# Patient Record
Sex: FEMALE | Race: WHITE
[De-identification: names, ages, dates, MRNs, and addresses within clinical notes are randomized per-mention and may not be internally consistent; named-entity substitution may affect disease eponyms.]

---

## 2019-04-03 ENCOUNTER — HOSPITAL ENCOUNTER (EMERGENCY)
Dept: HOSPITAL 46 - ED | Age: 69
LOS: 1 days | Discharge: ANOTHER HOSPITAL | End: 2019-04-04
Payer: MEDICARE

## 2019-04-03 VITALS — BODY MASS INDEX: 20.33 KG/M2 | WEIGHT: 142 LBS | HEIGHT: 70 IN

## 2019-04-03 DIAGNOSIS — Z88.8: ICD-10-CM

## 2019-04-03 DIAGNOSIS — Z79.899: ICD-10-CM

## 2019-04-03 DIAGNOSIS — Z79.4: ICD-10-CM

## 2019-04-03 DIAGNOSIS — Z87.891: ICD-10-CM

## 2019-04-03 DIAGNOSIS — Z88.5: ICD-10-CM

## 2019-04-03 DIAGNOSIS — E11.9: ICD-10-CM

## 2019-04-03 DIAGNOSIS — N18.9: ICD-10-CM

## 2019-04-03 DIAGNOSIS — R07.89: Primary | ICD-10-CM

## 2019-04-03 DIAGNOSIS — Z79.82: ICD-10-CM

## 2019-04-03 DIAGNOSIS — J44.9: ICD-10-CM

## 2019-04-03 DIAGNOSIS — R79.89: ICD-10-CM

## 2019-04-03 DIAGNOSIS — Z99.2: ICD-10-CM

## 2019-04-03 NOTE — XMS
Encounter Summary
  Created on: 2019
 
 Cherie Villalobos
 External Reference #: [id number]
 : 48
 Sex: Female
 
 Demographics
 
 
+-----------------------+--------------------------+
| Address               | [address]               |
|                       | [place]  [address] |
+-----------------------+--------------------------+
| Home Phone            | [phone]          |
+-----------------------+--------------------------+
| Preferred Language    | Unknown                  |
+-----------------------+--------------------------+
| Marital Status        |                   |
+-----------------------+--------------------------+
| Hinduism Affiliation | 1013                     |
+-----------------------+--------------------------+
| Race                  | Unknown                  |
+-----------------------+--------------------------+
| Ethnic Group          | Unknown                  |
+-----------------------+--------------------------+
 
 
 Author
 
 
+--------------+-----------------------+
| Author       | Sherry Harperlabz |
+--------------+-----------------------+
| Organization | [facility] |
+--------------+-----------------------+
| Address      | Unknown               |
+--------------+-----------------------+
| Phone        | Unavailable           |
+--------------+-----------------------+
 
 
 
 Support
 
 
+---------------+--------------+---------------------+-----------------+
| Name          | Relationship | Address             | Phone           |
+---------------+--------------+---------------------+-----------------+
| Anoop Villalobos | ECON         | [place], | [phone] |
|               |              |  [place]  [address]     |                 |
+---------------+--------------+---------------------+-----------------+
| Jennifer Dickson | ECON         | [place]       | [phone] |
|               |              | [phone]               |                 |
+---------------+--------------+---------------------+-----------------+
 
 
 
 
 Care Team Providers
 
 
+-----------------------+------+-----------------+
| Care Team Member Name | Role | Phone           |
+-----------------------+------+-----------------+
| Ivy Couch DO      | PCP  | [phone] |
+-----------------------+------+-----------------+
 
 
 
 Reason for Visit
 
 
+-------------------+----------+
| Reason            | Comments |
+-------------------+----------+
| Medication Refill |          |
+-------------------+----------+
 
 
 
 Encounter Details
 
 
+--------+--------+----------------------+----------------------+-------------+
| Date   | Type   | Department           | Care Team            | Description |
+--------+--------+----------------------+----------------------+-------------+
| / | Refill |   [facility]      |   João Asher MD  |             |
|    |        | [address]        |  [address]   |             |
|        |        | [address]   | [address]  [place], [place]    |             |
|        |        | [place] [address]   | [address]  [phone]  |             |
|        |        | [phone]         |  [phone] (Fax)  |             |
+--------+--------+----------------------+----------------------+-------------+
 
 
 
 Social History
 
 
+---------------+------------+-----------+--------+------------------+
| Tobacco Use   | Types      | Packs/Day | Years  | Date             |
|               |            |           | Used   |                  |
+---------------+------------+-----------+--------+------------------+
| Former Smoker | Cigarettes | 1         | 30     | Quit: 2004 |
+---------------+------------+-----------+--------+------------------+
 
 
 
+---------------------+---+---+---+
| Smokeless Tobacco:  |   |   |   |
| Never Used          |   |   |   |
+---------------------+---+---+---+
 
 
 
+------------------------------+
| Comments: quite smoking 2004 |
+------------------------------+
 
 
 
 
+-------------+-----------+---------+----------+
| Alcohol Use | Drinks/We | oz/Week | Comments |
|             | ek        |         |          |
+-------------+-----------+---------+----------+
| No          |           |         |          |
+-------------+-----------+---------+----------+
 
 
 
+------------------+---------------+
| Sex Assigned at  | Date Recorded |
| Birth            |               |
+------------------+---------------+
| Not on file      |               |
+------------------+---------------+
 as of this encounter
 
 Plan of Treatment
 
 
+--------+---------+-----------+----------------------+-------------+
| Date   | Type    | Specialty | Care Team            | Description |
+--------+---------+-----------+----------------------+-------------+
| 04/10/ | Office  | Podiatry  |   Srinivasan Jordan,  |             |
|    | Visit   |           | [facility]  [address], |             |
|        |         |           |  [address]  [place],  |             |
|        |         |           | [place] [address]             |             |
|        |         |           | [phone]         |             |
|        |         |           | [phone] (Fax)   |             |
+--------+---------+-----------+----------------------+-------------+
 as of this encounter
 
 Visit Diagnoses
 Not on filein this encounter

## 2019-04-03 NOTE — XMS
Encounter Summary
  Created on: 2019
 
 Cherie Villalobos
 External Reference #: [id number]
 : 48
 Sex: Female
 
 Demographics
 
 
+-----------------------+--------------------------+
| Address               | [address]               |
|                       | [place]  [address] |
+-----------------------+--------------------------+
| Home Phone            | [phone]          |
+-----------------------+--------------------------+
| Preferred Language    | Unknown                  |
+-----------------------+--------------------------+
| Marital Status        |                   |
+-----------------------+--------------------------+
| Orthodoxy Affiliation | 1013                     |
+-----------------------+--------------------------+
| Race                  | Unknown                  |
+-----------------------+--------------------------+
| Ethnic Group          | Unknown                  |
+-----------------------+--------------------------+
 
 
 Author
 
 
+--------------+--------------------------------------------+
| Author       | [facility]  |
|              | [facility] [place]                                |
+--------------+--------------------------------------------+
| Organization | [facility]  |
|              | [facility] [place]                                |
+--------------+--------------------------------------------+
| Address      | Unknown                                    |
+--------------+--------------------------------------------+
| Phone        | Unavailable                                |
+--------------+--------------------------------------------+
 
 
 
 Support
 
 
+---------------+--------------+---------------------+-----------------+
| Name          | Relationship | Address             | Phone           |
+---------------+--------------+---------------------+-----------------+
| Anoop Villalobos | ECON         | [address], | [phone] |
|               |              |  [place]  [address]     |                 |
+---------------+--------------+---------------------+-----------------+
| Jennifer Dickson | ECON         | [place], [place]       | [phone] |
|               |              | [phone]               |                 |
+---------------+--------------+---------------------+-----------------+
 
 
 
 
 Care Team Providers
 
 
+--------------------------+------+-----------------+
| Care Team Member Name    | Role | Phone           |
+--------------------------+------+-----------------+
| Araseli Montelongo Activity  | PCP  | [phone] |
| Professional             |      |                 |
+--------------------------+------+-----------------+
 
 
 
 Reason for Visit
 
 
+--------+--------------+
| Reason | Comments     |
+--------+--------------+
| Other  | heart issues |
+--------+--------------+
 
 
 
 Encounter Details
 
 
+--------+-----------+----------------------+----------------------+----------------------+
| Date   | Type      | Department           | Care Team            | Description          |
+--------+-----------+----------------------+----------------------+----------------------+
| / | Telephone |   [facility]          |   Abhijit,        | Other (heart issues) |
| 2019   |           | CARDIOLOGY  [address]    | JANETTE Plunkett  [address]  |                      |
|        |           | [address]  [place], | [address]  [place], |                      |
|        |           |  [place] [address]       |  [place] [address]       |                      |
|        |           | [phone]         | [phone]         |                      |
|        |           |                      | [phone] (Fax)   |                      |
+--------+-----------+----------------------+----------------------+----------------------+
 
 
 
 Social History
 
 
+---------------+------------+-----------+--------+------------------+
| Tobacco Use   | Types      | Packs/Day | Years  | Date             |
|               |            |           | Used   |                  |
+---------------+------------+-----------+--------+------------------+
| Former Smoker | Cigarettes | 1         | 30     | Quit: 2004 |
+---------------+------------+-----------+--------+------------------+
 
 
 
+---------------------+---+---+---+
| Smokeless Tobacco:  |   |   |   |
| Never Used          |   |   |   |
+---------------------+---+---+---+
 
 
 
 
+-------------+-----------+---------+----------+
| Alcohol Use | Drinks/We | oz/Week | Comments |
|             | ek        |         |          |
+-------------+-----------+---------+----------+
| No          |           |         |          |
+-------------+-----------+---------+----------+
 
 
 
+------------------+---------------+
| Sex Assigned at  | Date Recorded |
| Birth            |               |
+------------------+---------------+
| Not on file      |               |
+------------------+---------------+
 as of this encounter
 
 Functional Status
 
 
+---------------------------------------------+----------+--------------------+
| Functional Status                           | Response | Date of Assessment |
+---------------------------------------------+----------+--------------------+
| Are you deaf or do you have serious         | No       | 2018         |
| difficulty hearing?                         |          |                    |
+---------------------------------------------+----------+--------------------+
| Are you blind or do you have serious        | No       | 2018         |
| difficulty seeing, even when wearing        |          |                    |
| glasses?                                    |          |                    |
+---------------------------------------------+----------+--------------------+
| Do you have serious difficulty walking or   | No       | 2018         |
| climbing stairs? (5 years old or older)     |          |                    |
+---------------------------------------------+----------+--------------------+
| Do you have difficulty dressing or bathing? | No       | 2018         |
|  (5 years old or older)                     |          |                    |
+---------------------------------------------+----------+--------------------+
| Because of a physical, mental, or emotional | No       | 2018         |
|  condition, do you have difficulty doing    |          |                    |
| errands alone such as visiting a doctor's   |          |                    |
| office or shopping? [15 years old or        |          |                    |
| older)]                                     |          |                    |
+---------------------------------------------+----------+--------------------+
 
 
 
+---------------------------------------------+----------+--------------------+
| Cognitive Status                            | Response | Date of Assessment |
+---------------------------------------------+----------+--------------------+
| Because of a physical, mental, or emotional | No       | 2018         |
|  condition, do you have serious difficulty  |          |                    |
| concentrating, remembering, or making       |          |                    |
| decisions? (5 years old or older)           |          |                    |
+---------------------------------------------+----------+--------------------+
 as of this encounter
 
 Plan of Treatment
 
 
+--------+---------+------------+----------------------+-------------+
 
| Date   | Type    | Specialty  | Care Team            | Description |
+--------+---------+------------+----------------------+-------------+
| / | Office  | Cardiology |   Johnie John, |             |
| 2019   | Visit   |            |  MD  [address] |             |
|        |         |            |  [place],   |             |
|        |         |            | [place] [address]             |             |
|        |         |            | [phone]         |             |
|        |         |            | [phone] (Fax)   |             |
+--------+---------+------------+----------------------+-------------+
 as of this encounter
 
 Visit Diagnoses
 Not on filein this encounter

## 2019-04-03 NOTE — XMS
Encounter Summary
  Created on: 2019
 
 Cherie Villalobos
 External Reference #: [id number]
 : 48
 Sex: Female
 
 Demographics
 
 
+-----------------------+--------------------------+
| Address               | [address]               |
|                       | [place]  [address] |
+-----------------------+--------------------------+
| Home Phone            | [phone]          |
+-----------------------+--------------------------+
| Preferred Language    | Unknown                  |
+-----------------------+--------------------------+
| Marital Status        |                   |
+-----------------------+--------------------------+
| Catholic Affiliation | 1013                     |
+-----------------------+--------------------------+
| Race                  | Unknown                  |
+-----------------------+--------------------------+
| Ethnic Group          | Unknown                  |
+-----------------------+--------------------------+
 
 
 Author
 
 
+--------------+--------------------------------------------+
| Author       | [facility]  |
|              | [facility] [place]                                |
+--------------+--------------------------------------------+
| Organization | [facility]  |
|              | [facility] [place]                                |
+--------------+--------------------------------------------+
| Address      | Unknown                                    |
+--------------+--------------------------------------------+
| Phone        | Unavailable                                |
+--------------+--------------------------------------------+
 
 
 
 Support
 
 
+---------------+--------------+---------------------+-----------------+
| Name          | Relationship | Address             | Phone           |
+---------------+--------------+---------------------+-----------------+
| Anoop Villalobos | ECON         | [address] [place], | [phone] |
|               |              |  [place]  [address]     |                 |
+---------------+--------------+---------------------+-----------------+
| Jennifer Dickson | ECON         | [place]       | [phone] |
|               |              | [phone]               |                 |
+---------------+--------------+---------------------+-----------------+
 
 
 
 
 Care Team Providers
 
 
+-----------------------+------+-------------+
| Care Team Member Name | Role | Phone       |
+-----------------------+------+-------------+
 PCP  | Unavailable |
+-----------------------+------+-------------+
 
 
 
 Reason for Visit
 
 
+---------------+----------+
| Reason        | Comments |
+---------------+----------+
| Device Check  | Billed   |
| (Remote)      |          |
+---------------+----------+
 
 
 
 Encounter Details
 
 
+--------+----------+----------------------+----------------------+----------------------+
| Date   | Type     | Department           | Care Team            | Description          |
+--------+----------+----------------------+----------------------+----------------------+
| / | Implant  |   [facility]          |   Johnie John, | Remote Device        |
| 2019   | Monitor  | CARDIOLOGY  [address]    |  MD  [address] | Interrogation        |
|        |          | [address]  [place], |  [address]  [place],   | (Primary Dx); ICD    |
|        |          |  [place] [address]       | [place] [address]             | (implantable         |
|        |          | [phone]         | [phone]         | cardioverter-defibri |
|        |          |                      | [phone] (Fax)   | llator) [place]       |
|        |          |                      |                      | Scientific  18   |
|        |          |                      |                      | [place];           |
|        |          |                      |                      | Cardiomyopathy,      |
|        |          |                      |                      | unspecified type     |
|        |          |                      |                      | (HCC)                |
+--------+----------+----------------------+----------------------+----------------------+
 
 
 
 Social History
 
 
+---------------+------------+-----------+--------+------------------+
| Tobacco Use   | Types      | Packs/Day | Years  | Date             |
|               |            |           | Used   |                  |
+---------------+------------+-----------+--------+------------------+
| Former Smoker | Cigarettes | 1         | 30     | Quit: 2004 |
+---------------+------------+-----------+--------+------------------+
 
 
 
+---------------------+---+---+---+
 
| Smokeless Tobacco:  |   |   |   |
| Never Used          |   |   |   |
+---------------------+---+---+---+
 
 
 
+-------------+-----------+---------+----------+
| Alcohol Use | Drinks/We | oz/Week | Comments |
|             | ek        |         |          |
+-------------+-----------+---------+----------+
| No          |           |         |          |
+-------------+-----------+---------+----------+
 
 
 
+------------------+---------------+
| Sex Assigned at  | Date Recorded |
| Birth            |               |
+------------------+---------------+
| Not on file      |               |
+------------------+---------------+
 as of this encounter
 
 Functional Status
 
 
+---------------------------------------------+----------+--------------------+
| Functional Status                           | Response | Date of Assessment |
+---------------------------------------------+----------+--------------------+
| Are you deaf or do you have serious         | No       | 2018         |
| difficulty hearing?                         |          |                    |
+---------------------------------------------+----------+--------------------+
| Are you blind or do you have serious        | No       | 2018         |
| difficulty seeing, even when wearing        |          |                    |
| glasses?                                    |          |                    |
+---------------------------------------------+----------+--------------------+
| Do you have serious difficulty walking or   | No       | 2018         |
| climbing stairs? (5 years old or older)     |          |                    |
+---------------------------------------------+----------+--------------------+
| Do you have difficulty dressing or bathing? | No       | 2018         |
|  (5 years old or older)                     |          |                    |
+---------------------------------------------+----------+--------------------+
| Because of a physical, mental, or emotional | No       | 2018         |
|  condition, do you have difficulty doing    |          |                    |
| errands alone such as visiting a doctor's   |          |                    |
| office or shopping? [15 years old or        |          |                    |
| older)]                                     |          |                    |
+---------------------------------------------+----------+--------------------+
 
 
 
+---------------------------------------------+----------+--------------------+
| Cognitive Status                            | Response | Date of Assessment |
+---------------------------------------------+----------+--------------------+
| Because of a physical, mental, or emotional | No       | 2018         |
|  condition, do you have serious difficulty  |          |                    |
| concentrating, remembering, or making       |          |                    |
| decisions? (5 years old or older)           |          |                    |
+---------------------------------------------+----------+--------------------+
 as of this encounter
 
 
 Plan of Treatment
 
 
+--------+---------+------------+----------------------+-------------+
| Date   | Type    | Specialty  | Care Team            | Description |
+--------+---------+------------+----------------------+-------------+
| / | Office  | Cardiology |   Johnie John, |             |
|    | Visit   |            |  MD  [address] |             |
|        |         |            |  [place],   |             |
|        |         |            | [place] [address]             |             |
|        |         |            | [phone]         |             |
|        |         |            | [phone] (Fax)   |             |
+--------+---------+------------+----------------------+-------------+
 as of this encounter
 
 Procedures
 
 
+-----------------+--------+-------------+----------------------+----------------------+
| Procedure Name  | Priori | Date/Time   | Associated Diagnosis | Comments             |
|                 | ty     |             |                      |                      |
+-----------------+--------+-------------+----------------------+----------------------+
| DEVICE          | Routin | 2019  |   Remote Device      |   Results for this   |
| INTERROGATION-  | e      | 9 PDT    | Interrogation   ICD  | procedure are in the |
| REMOTE          |        |             | (implantable         |  results section.    |
|                 |        |             | cardioverter-defibri |                      |
|                 |        |             | llator) [place]       |                      |
|                 |        |             | Scientific  18   |                      |
|                 |        |             | [place]ora            |                      |
|                 |        |             | Cardiomyopathy,      |                      |
|                 |        |             | unspecified type     |                      |
|                 |        |             | (LTAC, located within [facility])                |                      |
+-----------------+--------+-------------+----------------------+----------------------+
 in this encounter
 
 Results
 Device Interrogation - Remote (2019 114)
 
+-----------------------------------------------------------------------+--------------+
| Narrative                                                             | Performed At |
+-----------------------------------------------------------------------+--------------+
|   This result has an attachment that is not available.  Johnie        |   [facility]    |
| MD [place]         2019 15:05Date of Remote Interrogation:    |              |
| 19 Refer to [facility] documentation and remote PDF scanned into    |              |
| EPIC for remote interrogation results.    Data collected by Clementina SALAZAR     |              |
| NAHUN Maynard Presenting rhythm:    sinus rhythm 59-60 beats.0           |              |
| ventricular high rate episodes. No tachycardia therapies recommended  |              |
| or delivered.Histogram    good.     Battery longevity                 |              |
| 12    years.Apparent normal and stable device function.Device         |              |
| interrogation due in office in 2019.                        |              |
|recommended or delivered.                                              |              |
|Histogram    good.     Battery longevity 12    years.                 |              |
|Apparent normal and stable device function.                            |              |
|Device interrogation due in office in 2019.                  |              |
|                                                                       |              |
+-----------------------------------------------------------------------+--------------+
 
 
 
 
+--------------+---------+--------------------+--------------+
| Performing   | Address | City/State/Zipcode | Phone Number |
| Organization |         |                    |              |
+--------------+---------+--------------------+--------------+
|   [facility]    |         |                    |              |
+--------------+---------+--------------------+--------------+
 in this encounter
 
 Visit Diagnoses
 
 
+------------------------------------------------------------------------------------+
| Diagnosis                                                                          |
+------------------------------------------------------------------------------------+
|   Remote Device Interrogation  - Primary                                           |
+------------------------------------------------------------------------------------+
|   Fitting and adjustment of automatic implantable cardiac defibrillator            |
+------------------------------------------------------------------------------------+
|   ICD (implantable cardioverter-defibrillator) [employer]  [address] |
+------------------------------------------------------------------------------------+
|   Cardiomyopathy, unspecified type (HCC)                                           |
+------------------------------------------------------------------------------------+

## 2019-04-03 NOTE — XMS
Encounter Summary
  Created on: 2019
 
 Cherie Villalobos
 External Reference #: [id number]
 : 48
 Sex: Female
 
 Demographics
 
 
+-----------------------+--------------------------+
| Address               | [address]               |
|                       | [place]  [address] |
+-----------------------+--------------------------+
| Home Phone            | [phone]          |
+-----------------------+--------------------------+
| Preferred Language    | Unknown                  |
+-----------------------+--------------------------+
| Marital Status        |                   |
+-----------------------+--------------------------+
| Voodoo Affiliation | 1013                     |
+-----------------------+--------------------------+
| Race                  | Unknown                  |
+-----------------------+--------------------------+
| Ethnic Group          | Unknown                  |
+-----------------------+--------------------------+
 
 
 Author
 
 
+--------------+-----------------------+
| Author       | Sherry Threefold Photos |
+--------------+-----------------------+
| Organization | [facility] |
+--------------+-----------------------+
| Address      | Unknown               |
+--------------+-----------------------+
| Phone        | Unavailable           |
+--------------+-----------------------+
 
 
 
 Support
 
 
+---------------+--------------+---------------------+-----------------+
| Name          | Relationship | Address             | Phone           |
+---------------+--------------+---------------------+-----------------+
| Anoop Villalobos | ECON         | [place], | [phone] |
|               |              |  [place]  [address]     |                 |
+---------------+--------------+---------------------+-----------------+
| Jennifer Dickson | ECON         | [place]       | [phone] |
|               |              | [address]               |                 |
+---------------+--------------+---------------------+-----------------+
 
 
 
 
 Care Team Providers
 
 
+-----------------------+------+-----------------+
| Care Team Member Name | Role | Phone           |
+-----------------------+------+-----------------+
| Ivy Couch DO      | PCP  | [phone] |
+-----------------------+------+-----------------+
 
 
 
 Reason for Visit
 
 
+---------------------+----------+
| Reason              | Comments |
+---------------------+----------+
| Chest Pain          |          |
+---------------------+----------+
| Shortness of Breath |          |
+---------------------+----------+
| Nausea              |          |
+---------------------+----------+
 Auth/Cert
 
+--------+--------+-----------+--------------+--------------+---------------+
| Status | Reason | Specialty | Diagnoses /  | Referred By  | Referred To   |
|        |        |           | Procedures   | Contact      | Contact       |
+--------+--------+-----------+--------------+--------------+---------------+
|        |        | Internal  |   Diagnoses  |              |   [facility] 4[facility]    |
|        |        | Medicine  |  Elevated    |              | [address]   |
|        |        |           | blood        |              | [address]
|        |        |           | pressure     |              |  [address]   |
|        |        |           | reading  S/P |              | [place], [place]  |
|        |        |           |  MVR (mitral |              | [address]  Phone: |
|        |        |           |  valve       |              |  [phone] |
|        |        |           | repair)  Hx  |              |   Fax:        |
|        |        |           | of CABG      |              | [phone]  |
|        |        |           | Chest pain   |              |               |
|        |        |           | in adult     |              |               |
|        |        |           |              |              |               |
+--------+--------+-----------+--------------+--------------+---------------+
 
 
 
 
 Encounter Details
 
 
+--------+-----------+----------------------+----------------------+----------------------+
| Date   | Type      | Department           | Care Team            | Description          |
+--------+-----------+----------------------+----------------------+----------------------+
| / | Emergency |   [facility]    |   Nathaniel De Luna, | Hx of CABG (Primary  |
| 2019 - |           | [facility]   |  DO  [address]  | Dx); Chest pain in   |
|        |           | [facility] |  EMERGENCY           | adult; S/P MVR       |
| / |           |   [address]     | DEPARTMENT           | (mitral valve        |
|    |           | [place] [address]   | [place] [address]   | repair); Elevated    |
|        |           | [phone]         | [phone]         | blood pressure       |
|        |           |                      | [phone] (Fax)   | reading; Chronic     |
 
|        |           |                      | Negro Lr, DO    | systolic congestive  |
|        |           |                      | [address]  [address]  | heart failure (HCC); |
|        |           |                      | [address]           |  Chest pain,         |
|        |           |                      | [place] [address]   | unspecified type     |
|        |           |                      | [phone]         |                      |
|        |           |                      | [phone] (Fax)   |                      |
|        |           |                      | Silsa Ferrara MD  [address]                      |
|        |           |                      |  [address]  [address]     |                      |
|        |           |                      | [address]           |                      |
|        |           |                      | [place] [address]   |                      |
|        |           |                      | [phone]         |                      |
|        |           |                      | [phone] (Fax)   |                      |
+--------+-----------+----------------------+----------------------+----------------------+
 
 
 
 Social History
 
 
+---------------+------------+-----------+--------+------------------+
| Tobacco Use   | Types      | Packs/Day | Years  | Date             |
|               |            |           | Used   |                  |
+---------------+------------+-----------+--------+------------------+
| Former Smoker | Cigarettes | 1         | 30     | Quit: 2004 |
+---------------+------------+-----------+--------+------------------+
 
 
 
+---------------------+---+---+---+
| Smokeless Tobacco:  |   |   |   |
| Never Used          |   |   |   |
+---------------------+---+---+---+
 
 
 
+------------------------------+
| Comments: quite smoking  |
+------------------------------+
 
 
 
+-------------+-----------+---------+----------+
| Alcohol Use | Drinks/We | oz/Week | Comments |
|             | ek        |         |          |
+-------------+-----------+---------+----------+
| No          |           |         |          |
+-------------+-----------+---------+----------+
 
 
 
+------------------+---------------+
| Sex Assigned at  | Date Recorded |
| Birth            |               |
+------------------+---------------+
| Not on file      |               |
+------------------+---------------+
 as of this encounter
 
 Last Filed Vital Signs
 
 
 
+-------------------+----------------------+-------------------------+
| Vital Sign        | Reading              | Time Taken              |
+-------------------+----------------------+-------------------------+
| Blood Pressure    | 108/56               | 2019 11:46 AM PST |
+-------------------+----------------------+-------------------------+
| Pulse             | 68                   | 2019 11:46 AM PST |
+-------------------+----------------------+-------------------------+
| Temperature       | 36.5   C (97.7   F)  | 2019 11:46 AM PST |
+-------------------+----------------------+-------------------------+
| Respiratory Rate  | 20                   | 2019 11:46 AM PST |
+-------------------+----------------------+-------------------------+
| Oxygen Saturation | 92%                  | 2019 11:46 AM PST |
+-------------------+----------------------+-------------------------+
| Inhaled Oxygen    | -                    | -                       |
| Concentration     |                      |                         |
+-------------------+----------------------+-------------------------+
| Weight            | 65.5 kg (144 lb 6.4  | 2019  3:16 AM PST |
|                   | oz)                  |                         |
+-------------------+----------------------+-------------------------+
| Height            | 177.8 cm (5' 10")    | 2019  1:52 PM PST |
+-------------------+----------------------+-------------------------+
| Body Mass Index   | 20.72                | 2019  3:16 AM PST |
+-------------------+----------------------+-------------------------+
 in this encounter
 
 Discharge Summaries
 Silas Ferrara MD - 2019  8:20 AM PSTFormatting of this note may be different from the 
original.
 [facility]
 Service:  Hospitalist
 Discharge Summary
 
 Date of Admission:  2019
 Date of Discharge:   2019
 
 Discharge Physician:  Silas Ferrara MD
 Treatment Team: 
 Admitting Provider: Negro Lr DO
 Discharge Diagnoses:   
 
 Principal Problem:
   Chest pain
 Active Problems:
   Coronary artery disease involving native coronary artery of native heart without angina p
ectoris
   ESRD (end stage renal disease) ([facility])
   Type 2 diabetes mellitus with chronic kidney disease on chronic dialysis, with long-term 
current use of insulin (HCC)
   Anemia in ESRD (end-stage renal disease) ([facility])
   Hypertension, essential
   Pleural effusion
   Chronic systolic congestive heart failure (HCC)
   Chronic diarrhea
   Chronic anticoagulation
   Cardiomyopathy (HCC)
   COPD (chronic obstructive pulmonary disease) ([facility])
   ICD (implantable cardioverter-defibrillator) in place
   Paroxysmal atrial fibrillation (HCC)
   Chronic multifocal osteomyelitis of left foot ([facility])
 
   PVD (peripheral vascular disease) ([facility])
 Resolved Problems:
   * No resolved hospital problems. *
 
 Procedures:  * No surgery found *
 
 Significant Diagnostic Studies:  No results found.
 
 BRIEF HISTORY OF PRESENTATION:  
      Cherie Villalobos is a 68 y.o. female who presented per H&P"from [facility] of chest pain that started last night about midnight and lasted until about 5 AM this morn
ing, pressure, intense, nonradiating, with nausea and dyspnea, no sweating. Improved with SL
 NTG initially, but the NTG's became less effective as she took more of them (5 total). Got 
a NTG patch at [facility] ED which was very effective in controlling the pain. She has ve
ry complicated cardiac history including CAD, CABG, systolic CHF, ischemic cardiomyopathy, p
acemaker, valve repair, afib, anticoagulation, with several other comorbidities including CO
PD, ESRD on HD, DM2, PVD. She has a cardiologist in [place] but [facility] refused to
 call that doctor to aid in decision-making and simply transferred the patient to [facility] ED.
 
 The patient was at [facility] a month ago for L foot toe amputation due to osteomyelitis. Dr Fr correia is her surgeon. During that hospitalization she c/o chest pain with troponin elevation
 to 0.318 and was evaluated by cardiology. Stress test a month prior had been normal. It was
 decided that medical management was the only reasonable option for the patient, given her e
xtensive comorbidities.
 
 Post-amputation she has been instructed to be strict NWB on LLE, and she was sent to [facility]. She remains on abx post-procedure managed by Dr Elliott. The patient is convinced that [facility] is not administering her meds correctly. She thinks they might be skipping her Eliqui
s, clopidogrel, and carvedilol. This happened before, earlier in 2018. She says the nurses dakota miller that the meds are given but they are not given. She does not want to return to [facility],
 if at all possible, and wants to return home with paid caregiver if that will comply with STEVEN Jordan's post-op instructions"
 HOSPITAL COURSE:  
 She was seen by  and he recc to stop all IV abx.
 She was discharged home with full time care giver. 
 She was seen by  and he recc medical management 
 Patient was discharged in stable condition. Addressed all of their questions and covered wi
th side affects of newly added medications. she was cleared per consulting services.
 
 Past Medical History 
 Diagnosis Date 
   Acute MI (HCC)  
  with stenting x 1 
   Anemia associated with chronic renal failure 2016 
   Atrial fibrillation (HCC)  
   Chronic kidney disease  
   Congestive heart failure (HCC)  
   COPD (chronic obstructive pulmonary disease) (HCC)  
   COPD (chronic obstructive pulmonary disease) ([facility]) 2018 
   Coronary artery disease  
  stent placements: 3 total 
   Depression  
   Diabetes other 
  abstracted 
   Diabetes mellitus, type 2 ([facility])  
   ESRD on peritoneal dialysis ([facility])  
   GERD (gastroesophageal reflux disease)  
   Hemodialysis patient ([facility]) 10/2016 
  Stopped peritoneal and restarted hemodialysis 
   Hemorrhage of gastrointestinal tract, unspecified 2017 
 
  low GI, rectal bleeding 
   High blood pressure other 
  abstracted 
   High cholesterol other 
  abstracted 
   Hyperlipidemia  
   Hypertension  
   Hyponatremia 2017 
   Myocardial infarction ([facility]) 2017 
   Other chronic pain  
  feet,  
   Pain of left hip joint 2016 
  due to hip fracture and replacement 
   Partial small bowel obstruction ([facility]) 2017 
  resolved 
   Renal failure  
  Stage 5.   Fistula in the JAN - not on dialysis yet. 
   Unspecified visual disturbance  
  glasses 
 
 Past Surgical History 
 Procedure Laterality Date 
   AV FISTULA PLACEMENT   
  left upper arm AV fistula - failed 
   AV FISTULA REPAIR N/A 10/25/2016 
  Procedure: AV FISTULA - GRAFT REPAIR/REVISION;  Surgeon: Kirt Mclaughlin MD;  Location: [facility]
IN OR;  Service: Vascular;  Laterality: N/A;  Superficialization and revision of left arm fi
stula 
   CARDIAC CATHETERIZATION  2017 
   CARPAL TUNNEL RELEASE Bilateral other 
  abstracted 
   CATARACT EXTRACTION Bilateral  
   CATHETER REMOVAL N/A 2016 
  Procedure: DIALYSIS CATHETER - REMOVAL;  Surgeon: Kirt Mclaughlin MD;  Location: [facility] MAIN OR; 
 Service: Vascular;  Laterality: N/A;  PD cath removal 
   CHOLECYSTECTOMY  other 
  abstracted 
   COLONOSCOPY   
   CORONARY ARTERY BYPASS GRAFT   
   CORONARY STENT PLACEMENT  2017 
  Drug Elutin stents to OM, 1 stent to prox. LAD 
   EYE SURGERY   
   FOOT AMPUTATION Left 2018 
  Procedure: FOREFOOT - AMPUTATION;  Surgeon: Srinivasan Jordan DPM;  Location: [facility] MAIN OR;
  Service: Podiatry;  Laterality: Left; 
   HARDWARE PRESENT   
  stent placements x 3, Left hip replacement, vascular stents 2 in left leg 
   HIP ARTHROPLASTY Left 2016 
  Procedure: HIP - ARTHROPLASTY(ALLISON);  Surgeon: Uriel Roa MD;  Location: [facility] MAIN 
OR;  Service: Orthopedics;  Laterality: Left; 
   HYSTERECTOMY  1998 
  complete 
   PACEMAKER INSERTION   
  [employer] pacemaker/defib 
   PERITONEAL CATHETER INSERTION  8/15/2013 
   PERITONEAL CATHETER INSERTION N/A 2016 
  Procedure: LAPAROSCOPIC - PERITONEAL DIALYSIS CATH INSERTION;  Surgeon: Kirt Mclaughlin MD;  Lo
cation: [facility] MAIN OR;  Service: Vascular;  Laterality: N/A;  Removal of old catheter 
   SHOULDER SURGERY Right  
  frozen shoulder 
 
   UNLISTED PROCEDURE ARTHROSCOPY   
  total Left hip 
   vascular stents Left 2017 
  leg (2 stents) 
   VASCULAR SURGERY   
 
 Allergies 
 Allergen Reactions 
   Quinapril Hcl Anaphylaxis 
   Digoxin And Related Other (See Comments) 
   toxic 
   Metaxalone Other (See Comments) 
   Morphine Mental Changes 
   Make her crazy/ "funny feeling in my head"
 Has used hydromorphone in-house 
   Pantoprazole Sodium Nausea and Vomiting 
   Penicillins Rash 
   Quinapril Hcl Edema 
   Facial Edema 
   Sudafed [Pseudoephedrine Hcl] Rash 
 
 No prescriptions prior to admission. 
 
 DISCHARGE EXAM
 Vital Signs:
 /56 (BP Location: Right upper arm)  | Pulse 68  | Temp 97.7 F (36.5 C) (Axillary)
  | Resp 20  | Ht 1.778 m (5' 10")  | Wt 65.5 kg (144 lb 6.4 oz)  | SpO2 92%  | BMI 20.72 kg
/m 
 General appearance: alert, appears stated age, cooperative, fatigued and no distress
 Head: Normocephalic, without obvious abnormality, atraumatic
 Neck: no adenopathy, no carotid bruit, no JVD, supple, symmetrical, trachea midline and thy
roid not enlarged, symmetric, no tenderness/mass/nodules
 Lungs: clear to auscultation bilaterally
 Heart: regular rate and rhythm, S1, S2 normal, no murmur, click, rub or gallop
 Abdomen: soft, non-tender; bowel sounds normal; no masses,  no organomegaly
 Extremities: left leg boot 
 Pulses: 2+ and symmetric
 Neurologic: Grossly normal AXO3 non focal 
 
 DATA
 
 CBC:  
 Lab Results 
 Component Value Date 
  WBC 4.08 2019 
  RBC 2.83 (L) 2019 
  HGB 9.7 (L) 2019 
  HCT 29.7 (L) 2019 
  .0 (H) 2019 
  MCH 34.2 (H) 2019 
  MCHC 32.5 2019 
  RDW 64.3 (H) 2019 
   (L) 2019 
  MPV 9.5 2019 
  DIFFTYPE MANUAL 2019 
 
 CMP:  
 Lab Results 
 Component Value Date 
   2019 
 
  K 4.4 2019 
   2019 
  CO2 28 2019 
  ANIONGAP 14 2019 
  GLUF 153 (H) 2019 
  BUN 15 2019 
  CREATININE 4.9 (H) 2019 
  BCR 3 2019 
  CA 9.6 2019 
  CA 8.7 2016 
  PROT 6.1 (L) 2019 
  ALB 2.6 (L) 2019 
  GLOB 3.5 2019 
  BILITOT 0.5 2019 
  ALP 85 2019 
  AST 22 2019 
  ALT 22 2019 
  EGFR 9 (L) 2019 
 
 Lab Results 
 Component Value Date 
  CKTOTAL 22 (L) 2019 
  CKMB 1.8 2019 
  CKMBINDEX 8.2 2019 
  TROPONINI 0.061 2019 
 
 Disposition:  Home
 Condition:  Stable
 Code Status:  Prior
 
 Discharge Instructions
 Diet Renal 
 
 Diet Low Sodium 
 
 Activity as Advised by Physical Therapy 
 
 Call MD for: Temperature > 100.4F (38C) 
 
 Call MD for:  Persistant Nausea and Vomiting 
 
 Call MD for:  Severe Uncontrolled Pain 
 
 Call MD for:  Redness, Tenderness, or Signs of Infection (Pain, Swelling, Redness, Odor or 
Green/Yellow Discharge Around Incision Site) 
 
 Call MD for:  Hives 
 
 Call MD for:  Difficulty Breathing, Headache or Visual Disturbances 
 
 Call MD for:  Persistant Dizziness or Light-Headedness 
 
 Call MD for: Extreme Fatigue 
 
 Follow up:
 Ivy Couch, DO
 [address], [address]
 [facility] [id number] [phone]
 
 
 Schedule an appointment as soon as possible for a visit in 1 week
 
 Andrés Elliott MD
 [address] [facility] [id number] [phone]
 
 Schedule an appointment as soon as possible for a visit in 1 week
 
 Srinivasan Jordan DPM
 [address] [facility], [address]
 [facility] [id number] [phone]
 
 Schedule an appointment as soon as possible for a visit in 2 weeks
 
 Celestino Porras MD
 [address]  [facility]
 [facility] [place] [place] [id number] [phone]
 
 As needed
 
  
 Medication List 
  
 CHANGE how you take these medications  
 carvedilol 12.5 MG tablet
 QTY:  60 tablet
 Refills:  0
 Doctor's comments:  Hold for SBP less than 100
 Hold for HR less than 60
 Commonly known as:  COREG
 Take 1 tablet by mouth 2 (two) times daily with meals.
 What changed:
  medication strength
  how much to take
  
 LORazepam 1 MG tablet
 QTY:  30 tablet
 Refills:  0
 Commonly known as:  ATIVAN
 Take 1 tablet by mouth every 8 (eight) hours as needed for Anxiety.
 What changed:  when to take this
  
 losartan 25 MG tablet
 QTY:  30 tablet
 Refills:  0
 Commonly known as:  COZAAR
 Take 1 tablet by mouth daily.
 What changed:  how much to take
  
  
 CONTINUE taking these medications  
 * albuterol 108 (90 Base) MCG/ACT inhaler
 Refills:  0
 Commonly known as:  PROVENTIL HFA;VENTOLIN HFA
  
 * VENTOLIN  (90 Base) MCG/ACT inhaler
 Refills:  0
 
 Generic drug:  albuterol
  
 apixaban 2.5 MG tablet
 QTY:  60 tablet
 Refills:  0
 Commonly known as:  ELIQUIS
 Take 1 tablet by mouth 2 (two) times daily.
  
 aspirin 81 MG EC tablet
 QTY:  30 tablet
 Refills:  0
 Take 1 tablet by mouth daily with breakfast.
  
 atorvastatin 40 MG tablet
 QTY:  30 tablet
 Refills:  0
 Commonly known as:  LIPITOR
 Take 1 tablet by mouth nightly.
  
 calcium acetate 667 MG capsule
 Refills:  0
 Commonly known as:  PHOSLO
  
 clopidogrel 75 MG tablet
 QTY:  30 tablet
 Refills:  11
 Commonly known as:  PLAVIX
 Take 1 tablet by mouth daily.
  
 esomeprazole 20 MG capsule
 Refills:  0
 Commonly known as:  NEXIUM
  
 HYDROcodone-acetaminophen 5-325 MG per tablet
 QTY:  30 tablet
 Refills:  0
 Commonly known as:  NORCO
 Take 1 tablet by mouth every 4 (four) hours as needed.
  
 insulin aspart 100 UNIT/ML injection
 Refills:  0
 Commonly known as:  NOVOLOG
  
 insulin degludec 100 UNIT/ML injection
 Refills:  0
 Commonly known as:  TRESIBA
  
 isosorbide mononitrate 60 MG 24 hr tablet
 QTY:  30 tablet
 Refills:  1
 Commonly known as:  IMDUR
 Take 1 tablet by mouth daily.
  
 loperamide 2 MG capsule
 Refills:  0
 Commonly known as:  IMODIUM
  
 nitroGLYCERIN 0.4 MG SL tablet
 QTY:  30 tablet
 Refills:  0
 
 Doctor's comments:  Hold for SBP less than 100
 Commonly known as:  NITROSTAT
 Place 1 tablet under the tongue every 5 (five) minutes as needed for Chest pain.
  
 nortriptyline 25 MG capsule
 Refills:  0
 Commonly known as:  PAMELOR
  
 ondansetron 4 MG disintegrating tablet
 Refills:  0
 Commonly known as:  ZOFRAN-ODT
  
 oxybutynin 5 MG tablet
 Refills:  0
 Commonly known as:  DITROPAN
  
 prochlorperazine 5 MG tablet
 Refills:  0
  
 ranitidine 150 MG tablet
 Refills:  0
 Commonly known as:  ZANTAC
  
 rOPINIRole 0.25 MG tablet
 Refills:  0
 Commonly known as:  REQUIP
  
 SLOW-MAG 71.5-119 MG Tbec
 Refills:  0
 Generic drug:  Magnesium Cl-Calcium Carbonate
  
 Vitamin D3 5000 units Caps
 Refills:  0
  
 Vortioxetine HBr 10 MG Tabs
 Refills:  0
  
  
 * This list has 2 medication(s) that are the same as other medications prescribed for you. 
Read the directions carefully, and ask your doctor or other care provider to review them wit
h you. 
  
  
  
 You might also be taking other medications not listed above. If you have questions about an
y of your other medications, talk to the person who prescribed them or your Primary Care Pro
vider. 
  
  
  
 STOP taking these medications  
 CefTAZidime and Dextrose 2-5 GM-%(50ML) Solr
  
 furosemide 20 MG tablet
 Commonly known as:  LASIX
  
 vancomycin 1000 mg injection
 Commonly known as:  VANCOCIN
  
  
 
  
 Where to Get Your Medications 
  
 You can get these medications from any pharmacy  
 Bring a paper prescription for each of these medications
  carvedilol 12.5 MG tablet
  LORazepam 1 MG tablet
  nitroGLYCERIN 0.4 MG SL tablet
  
 
 Discharge took over 30  minutes, to include final examination, discussion of admission, and
 preparation of prescriptions, instructions for on-going care, follow-up and documentation o
f discharge summary.
 
 Silas Ferrara MD
 2019in this encounter
 
 Discharge Instructions
 Julia Riley, RN - 2019
 Stable Angina
 The chest discomfort you have appears to be coming from your heart  a condition called ang
micaela. Angina is a pain in the heart due to poor blood flow to the heart muscle. This can occu
r when plaque builds up on the artery wall or a blood clot forms in one or more of the small
 blood vessels that deliver oxygen to the heart muscle. Plaque is a fatty material made up o
f cholesterol, calcium deposits, and other materials.
 Exercise, increased activity, emotional upset, or stress can trigger this pain. With proper
 treatment and lifestyle changes to reduce risk factors, most people with angina are able to
 maintain a full and active life.
 Angina is not a heart attack. But if angina pain is severe or prolonged, it is a sign ofa
n impending heart attack, also called acute myocardial infarction, or AMI. Your angina is un
elizabeth control at this time. Therefore, it is safe for you to go home. Follow up as instructed 
by your doctor for any further tests and office visits.
 
 Home care
  Rest at home today and avoid any emotional or physically strenuous activity.
  Take medicine (usually nitroglycerin) for chest pain exactly as prescribed. Keep your ni
troglycerin with you at all times.
  When taking nitroglycerin for angina, sit or lie down. The medicine may make you feel di
zzy.
  Place one tablet under your tongue, or between your lip and gum, or between your cheek a
nd gum. Let the tablet dissolve completely; do not chew or swallow the tablet.
  If you use a spray, then spray once on orunder your tongue. Do not inhale. Right after
 you use the spray, close your mouth. Wait a few seconds before you swallow.
  After taking one tablet or spraying once, continue sitting or lying for 5 minutes.
  If the angina goes away completely, rest awhile and continue your normal routine.
 Note: Your healthcare provider may give you slightly different instructions than those claudine fernandez. If so, follow them carefully.
 Prevention
  Learn how to take your own blood pressure. Keep a record of your results. Ask your docto
r which readings mean that you need medical attention. Reduce salt intake and follow lifesty
le change instructions if you have high blood pressure (hypertension).
  Maintain a healthy weight. Get help to lose any extra pounds. Talk to your doctor about 
a safe diet program. Sudden large weight losses can be dangerous.
  If you have diabetes, talk to your doctor about healthy control of your blood sugar.
  Begin an exercise program. Ask your doctor how to get started. You can benefit from simp
le activities such as walking or gardening. Short, high intensity exercise sessions may also
 be beneficial.
  Break the smoking habit. Enroll in a stop-smoking program to improve your chances of suc
cess.
  Get adequate rest.
 
  Stay away from stressful situations. Learn stress-management techniques.
 Follow-up care
 Followup with your doctor, or as advised.
 If an X-ray wasdone , it will be reviewed by another specialist. You will be notified of 
any new findings that may affect your care.
 Call 911
 This is the fastest and safest way to get to the nearest emergency department. The paramedi
cs can also start treatment on the way to the hospital, saving valuable time for your heart.
  Ifangina gets worse, it continues, or if it stops and returns, call 911 right away. Do
n't delay. You may be having a heart attack.
  After you call 911, take a second nitroglycerine tablet or spray unless instructed other
wise. When repeating doses, sit down if possible because it can make you feel lightheaded or
 dizzy. Wait another 5 minutes. If the angina still does not go away, take a third tablet or
 spray. Don't take more than 3 tablets or sprays within 15 minutes. Stay on the phone with 9
11 for more instructions.
  Your healthcare provider may give you slightly different instructions than those above. 
If so, follow them carefully.
 Don'twait until your symptoms are severe to call 911. Other reasons to call 911 besides c
hest pain include:
  Trouble breathing
  Feeling lightheaded, faint, or dizzy
  Rapid heart beat
  Slower than usual heart rate compared to your normal
  Angina with weakness, dizziness, fainting, heavy sweating, nausea, or vomiting
  Extreme drowsiness, or confusion
  Weakness of an arm or leg or on one side of the face
  Difficulty with speech or vision
 When to seek medical advice
 Remember, the signs and symptoms of a heart attack are not always like they are on TV. Some
times they are not so obvious. You may only feel weak or just "not right." If it is not charly
r or if you have any doubt, call for advice.
  Seek help if there is a change in the type of pain, if it feels different, or if your sy
mptoms are mild.
  Don't drive yourself. Have someone else drive. If no one can drive you, call 911.
  If your doctor has given you medicine to take when you have symptoms, take them, but do 
not delay getting help.
  Don't delay. Fast diagnosis and treatment can prevent or limit the amount of heart dam
age during a heart attack or stroke.
  Don't go to your doctor's office or a clinic because they may not be able to provide all
 the testing and treatment you need.
 Date Last Reviewed: 2015-2018 [employer]. [address] [place] [address], [place] [address]. All righ
ts reserved. This information is not intended as a substitute for professional medical care.
 Always follow your healthcare professional's instructions.
 
 in this encounter
 
 Medications at Time of Discharge
 
 
+----------------------+----------------------+--------+---------+----------+-----------+
| Medication           | Sig.                 | Disp.  | Refills | Start    | End Date  |
|                      |                      |        |         | Date     |           |
+----------------------+----------------------+--------+---------+----------+-----------+
|   albuterol          | Inhale 2 puffs into  |        |         |          |           |
| (PROVENTIL           | the lungs every 4    |        |         |          |           |
| HFA;VENTOLIN HFA)    | (four) hours as      |        |         |          |           |
| 108 (90 Base)        | needed for Wheezing. |        |         |          |           |
| MCG/ACT              |                      |        |         |          |           |
| inhalerIndications:  |                      |        |         |          |           |
 
| states uses 2x       |                      |        |         |          |           |
| monthly average      |                      |        |         |          |           |
+----------------------+----------------------+--------+---------+----------+-----------+
|   apixaban (ELIQUIS) | Take 1 tablet by     |   60   | 0       | 20 |           |
|  2.5 MG tablet       | mouth 2 (two) times  | tablet |         | 18       |           |
|                      | daily.               |        |         |          |           |
+----------------------+----------------------+--------+---------+----------+-----------+
|   carvedilol (COREG) | Take 1 tablet by     |   60   | 0       | 20 |  |
|  12.5 MG tablet      | mouth 2 (two) times  | tablet |         | 19       | 0         |
|                      | daily with meals.    |        |         |          |           |
+----------------------+----------------------+--------+---------+----------+-----------+
|   esomeprazole       | Take 1 capsule by    |        |         |          |           |
| (NEXIUM) 40 MG       | mouth every day      |        |         |          |           |
| capsule              |                      |        |         |          |           |
+----------------------+----------------------+--------+---------+----------+-----------+
|                      | Take 1 tablet by     |   30   | 0       | 20 |           |
| HYDROcodone-acetamin | mouth every 4 (four) | tablet |         | 19       |           |
| ophen (NORCO) 5-325  |  hours as needed.    |        |         |          |           |
| MG per tablet        |                      |        |         |          |           |
+----------------------+----------------------+--------+---------+----------+-----------+
|   insulin aspart     | Inject  into the     |        |         |          |           |
| (NOVOLOG) 100        | skin 3 (three) times |        |         |          |           |
| UNIT/ML injection    |  daily before meals. |        |         |          |           |
|                      |  Sliding scale       |        |         |          |           |
+----------------------+----------------------+--------+---------+----------+-----------+
|   insulin degludec   | Inject 21 units      |        |         |          |           |
| (TRESIBA) 100        | every night          |        |         |          |           |
| UNIT/ML injection    |                      |        |         |          |           |
+----------------------+----------------------+--------+---------+----------+-----------+
|   isosorbide         | Take 1 tablet by     |   30   | 1       | 20 |  |
| mononitrate (IMDUR)  | mouth daily.         | tablet |         | 18       | 9         |
| 60 MG 24 hr tablet   |                      |        |         |          |           |
+----------------------+----------------------+--------+---------+----------+-----------+
|   nitroGLYCERIN      | Place 1 tablet under |   30   | 0       | 20 |  |
| (NITROSTAT) 0.4 MG   |  the tongue every 5  | tablet |         | 19       | 0         |
| SL tablet            | (five) minutes as    |        |         |          |           |
|                      | needed for Chest     |        |         |          |           |
|                      | pain.                |        |         |          |           |
+----------------------+----------------------+--------+---------+----------+-----------+
|   nortriptyline      | Take 25-75 mg by     |        |         |          |           |
| (PAMELOR) 25 MG      | mouth See Admin      |        |         |          |           |
| capsule              | Instructions. Takes  |        |         |          |           |
|                      | 25 mg by mouth every |        |         |          |           |
|                      |  morning and 75 mg   |        |         |          |           |
|                      | every night          |        |         |          |           |
+----------------------+----------------------+--------+---------+----------+-----------+
|   ondansetron        | Take 4 mg by mouth   |        |         | 20 |           |
| (ZOFRAN-ODT) 4 MG    | every 8 (eight)      |        |         | 18       |           |
| disintegrating       | hours as needed.     |        |         |          |           |
| tablet               |                      |        |         |          |           |
+----------------------+----------------------+--------+---------+----------+-----------+
|   oxybutynin         | Take 7.5 mg by mouth |        |         |          |           |
| (DITROPAN) 5 MG      |  2 (two) times       |        |         |          |           |
| tablet               | daily.               |        |         |          |           |
+----------------------+----------------------+--------+---------+----------+-----------+
|   rOPINIRole         | Take 0.75 mg by      |        |         |          |           |
| (REQUIP) 0.25 MG     | mouth nightly.       |        |         |          |           |
| tablet               |                      |        |         |          |           |
+----------------------+----------------------+--------+---------+----------+-----------+
|   albuterol          | Ventolin HFA 90      |        |         |          |  |
 
| (VENTOLIN HFA) 108   | mcg/actuation        |        |         |          | 9         |
| (90 Base) MCG/ACT    | aerosol inhaler      |        |         |          |           |
| inhaler              | Inhale 2 puffs every |        |         |          |           |
|                      |  4 hours by          |        |         |          |           |
|                      | inhalation route as  |        |         |          |           |
|                      | needed.              |        |         |          |           |
+----------------------+----------------------+--------+---------+----------+-----------+
|   aspirin 81 MG EC   | Take 1 tablet by     |   30   | 0       | 07/10/20 |  |
| tablet               | mouth daily with     | tablet |         | 17       | 9         |
|                      | breakfast.           |        |         |          |           |
+----------------------+----------------------+--------+---------+----------+-----------+
|   atorvastatin       | Take 1 tablet by     |   30   | 0       | 20 |  |
| (LIPITOR) 40 MG      | mouth nightly.       | tablet |         | 19       | 9         |
| tablet               |                      |        |         |          |           |
+----------------------+----------------------+--------+---------+----------+-----------+
|   calcium acetate    | 667 mg 3 (three)     |        |         | 20 |  |
| (PHOSLO) 667 MG      | times daily with     |        |         | 16       | 9         |
| capsule              | meals.               |        |         |          |           |
+----------------------+----------------------+--------+---------+----------+-----------+
|   Cholecalciferol    | Take 5,000 capsules  |        |         |          |  |
| (VITAMIN D3) 5000    | by mouth every other |        |         |          | 9         |
| UNITS CAPS           |  day.                |        |         |          |           |
+----------------------+----------------------+--------+---------+----------+-----------+
|   clopidogrel        | Take 1 tablet by     |   30   | 11      | 20 |  |
| (PLAVIX) 75 MG       | mouth daily.         | tablet |         | 18       | 9         |
| tablet               |                      |        |         |          |           |
+----------------------+----------------------+--------+---------+----------+-----------+
|   loperamide         | 2 mg as needed.      |        |         |          |  |
| (IMODIUM) 2 MG       |                      |        |         |          | 9         |
| capsule              |                      |        |         |          |           |
+----------------------+----------------------+--------+---------+----------+-----------+
|   LORazepam (ATIVAN) | Take 1 tablet by     |   30   | 0       | 20 | 02/15/201 |
|  1 MG tablet         | mouth every 8        | tablet |         | 19       | 9         |
|                      | (eight) hours as     |        |         |          |           |
|                      | needed for Anxiety.  |        |         |          |           |
+----------------------+----------------------+--------+---------+----------+-----------+
|   Magnesium          | Take 1 tablet by     |        |         |          |  |
| Cl-Calcium Carbonate | mouth every other    |        |         |          | 9         |
|  (SLOW-MAG) 71.5-119 | day.                 |        |         |          |           |
|  MG TBEC             |                      |        |         |          |           |
+----------------------+----------------------+--------+---------+----------+-----------+
|   prochlorperazine   | Take 5 mg by mouth 2 |        |         |          |  |
| (COMPAZINE) 5 MG     |  (two) times daily   |        |         |          | 9         |
| tabletIndications:   | as needed for        |        |         |          |           |
| Severe Nausea and    | Nausea. Indications: |        |         |          |           |
| Vomiting             |  Severe Nausea and   |        |         |          |           |
|                      | Vomiting             |        |         |          |           |
+----------------------+----------------------+--------+---------+----------+-----------+
|   ranitidine         | ranitidine 150 mg    |        |         |          |  |
| (ZANTAC) 150 MG      | tablet Take 1 tablet |        |         |          | 9         |
| tablet               |  twice a day by oral |        |         |          |           |
|                      |  route.              |        |         |          |           |
+----------------------+----------------------+--------+---------+----------+-----------+
|   Vortioxetine HBr   | 10 mg nightly.       |        |         |          |  |
| 10 MG TABS           |                      |        |         |          | 9         |
+----------------------+----------------------+--------+---------+----------+-----------+
 as of this encounter
 
 Progress Notes
 Ethan Awad MD - 2019 12:31 PM PSTFormatting of this note may be different from t
 
adrianna original.
 4460/4460-1  
    LOS: 0 days 
 She is followed for ESRD management.
 She was admitted with chest pain and concern with unstable angina/NSTEMI.
 I assumed nephrology care from Dr. Nguyen 19.
 She feels OK today and is happy at being discharged today.
 She had uncomplicated HD yesterday and has no chest pain today.
 She is ambulating with minimal weight bearing left foot.
 She has no chest pain.
 
 PMH, PSH, Social history reviewed in chart. Allergies and medications reviewed. Previous hi
story summarized as above. Prior lab data and imaging reviewed.Patient's old records and lab
s were reviewed in detail and summarized.
 
 ROS:
 Review of systems is as per history of present illness. Otherwise a 14 organ system review 
of systems was done and is negative.
 
 Examination:
 
 APPEARANCE: She is is alert, awake, sitting up in chair in no distress. 
 VITALS: Reviewed as listed.
 HEAD: NC/AT. 
 EYES: EOMI. Non-icteric sclera.
 NECK: No JVD. 
 LUNGS: Effort fair. CTA. 
 HEART: S1 soft, no pericardial rub noted. Systolic murmur at apex with radiation to back.
 ABDOMEN: Soft with minimal distention, no tenderness. No organomegaly or bruits noted. Benoit
l sounds diminished.
 EXTREMITIES: No LE edema. No cyanosis or clubbing. Peripheral pulses not palpable. Left jeanne
t s/p TMA and in a boot.
 SKIN: Warm to touch. No rash.
 NEUROLOGIC: Alert, awake, oriented, speech fluent. Gait not tested. 
 PSYCH: pleasant demeanor..
 BACK: No CVA tenderness noted. There is no sacral edema.
 
 Dialysis access
 Left brachiocephalic AV fistula with no evidence of malfunction or inflammation
 
 vital Signs:
 /56 (BP Location: Right upper arm)  | Pulse 68  | Temp 97.7 F (36.5 C) (Axillary)
  | Resp 20  | Ht 1.778 m (5' 10")  | Wt 65.5 kg (144 lb 6.4 oz)  | SpO2 92%  | BMI 20.72 kg
/m 
 
 Data evaluation:
 
 Lab Results 
 Component Value Date 
  BUN 15 2019 
  CREATININE 4.9 (H) 2019 
  EGFR 9 (L) 2019 
   2019 
  K 4.4 2019 
   2019 
  CO2 28 2019 
  CA 9.6 2019 
  PHOS 4.4 2019 
  MG 2.2 2018 
  ALB 2.6 (L) 2019 
 
  HGB 9.7 (L) 2019 
 
 Lab Results 
 Component Value Date 
  HGB 9.7 (L) 2019 
  HGB 8.5 (L) 2019 
  HGB 8.7 (L) 2019 
  FERRITIN 722 (H) 2017 
  FERRITIN 793 (H) 2016 
  FERRITIN 883 (H) 2016 
  LABIRON 28 2017 
  LABIRON 17 2016 
  LABIRON 31 2016 
 
 Lab Results 
 Component Value Date 
  ANIONGAP 14 2019 
 
 Last 3 Troponin:  
 Lab Results 
 Component Value Date 
  TROPONINI 0.061 2019 
  TROPONINI 0.075 2019 
  TROPONINI 0.256 (H) 2019 
 
  Lower Extremity Arterial Left 2018 showed Greater than 50% stenosis of the mid sup
erficial femoral artery. 2.  Monophasic single runoff to the ankle via the anterior tibial a
rtery.
 
 X-ray Foot Left 2018 showed amputation of the left forefoot at the level of the proxi
mal metatarsals. No radiographic evidence for osteomyelitis. Normal alignment of the hindfoo
t. Mild degeneration of the midfoot. 
 
 EKG 19 showed Normal sinus rhythm. Non-specific intra-ventricular conduction delay. No
nspecific ST abnormality. Poor R - progression 
 
 Assessment and Recommendations:
 
 1. ESRD on HD
 2. Anemia chronic renal failure
 3. Chronic systolic CHF s/p ICD
 4. Chest pain with concern for angina
 5. Anemia chronic renal failure
 6. Chronic anticoagulation
 7. PAD s/p left TMA
 8. Paroxysmal Afib
 9. CAD s/p CABG
 
 She is hemodynamically stable with no fever/tachycardia/bradycardia/hypotension or severe h
ypertension/hypoxia at rest.
 She is euvolemic.
 BP is OK and she remains in SR on EKG. She remains on BB/ARB.
 It doesn't look like she had an AMI.
 She remains on Clopidogrel/apixaban/atorvastatin/long acting nitrate. She has been seen by 
Dr. Porras whose evaluation is noted.
 Gastritis was a consideration. She remains on PPI.
 
  From my side she can be discharged and resume outpatient HD TTS.
  Continue follow up with cardiology for optimal medical management.
 
 
 She should be on a 2 gm Na, 2 gm K, 1 gm PO4, 1 gm/kg protein diet.
 Please dose all medications for an eGFR of less than 15 ml/min/1.73 m2.
 NSAIDS/HOPPER 2 inhibitors should be avoided. Aluminum/magnesium containing antacids and magne
sium/phosphate containing enemas should be avoided.
 Fluid should be restricted to less than 1.5 L in a 24 hr period.
 Strict I/O should be done.
 Plan of care was discussed with Dr. Ferrara.
 
 ETHAN AWAD MD
 2019
 
 Portions of my previous notes have been carried over for continuity of care.
 She was seen earlier in the day and charting was completed later after rounds.
 Dictation software, Dragon, was used which may contain error for similar sounding words. Pe
rsonal communication is requested for any clarification.
 Prognosis is guarded in view of multiple comorbid illnesses and ESRD including but not limi
ashly to death.
 
   apixaban  2.5 mg Oral BID 
   atorvastatin  40 mg Oral Nightly 
   calcium acetate  667 mg Oral TID WC 
   calcium carbonate  1,000 mg Oral Q48H 
  And 
   magnesium chloride  1 tablet Oral Q48H 
   carvedilol  12.5 mg Oral BID WC 
   cholecalciferol  1,000 Units Oral Daily 
   clopidogrel  75 mg Oral Daily 
   insulin detemir  10 Units Subcutaneous Nightly 
   insulin lispro (human)  0-3 Units Subcutaneous Nightly 
   insulin lispro (human)  0-6 Units Subcutaneous TID AC 
   isosorbide mononitrate  60 mg Oral Daily 
   losartan  25 mg Oral Daily 
   nortriptyline  25 mg Oral QAM 
   nortriptyline  75 mg Oral Nightly 
   oxybutynin  2.5 mg Oral BID 
   pantoprazole  40 mg Oral QAM AC 
   rOPINIRole  0.75 mg Oral Nightly 
 
   dextrose   
 
 Celestino Porras MD - 2019  7:14 AM PSTFormatting of this note may be different fro
m the original.
 [facility]
 Service:  Cardiology
 Progress Note
 
 Name of Consultant: Celestino Porras MD
 I have seen the patient on 2019 
 SUBJECTIVE
 
 Current Facility-Administered Medications: 
    acetaminophen (TYLENOL) tablet 650 mg, 650 mg, Oral, Q6H PRN **OR** acetaminophen (TYL
ENOL) suppository 650 mg, 650 mg, Rectal, Q6H PRN, Negro Lr DO
    albuterol (PROVENTIL HFA;VENTOLIN HFA) inhaler, 2 puff, Inhalation, Q4H PRN, Negro alexander DO
    apixaban (ELIQUIS) tablet 2.5 mg, 2.5 mg, Oral, BID, Negro Lr DO, 2.5 mg at  2153
    atorvastatin (LIPITOR) tablet 40 mg, 40 mg, Oral, Nightly, Negro Lr DO, 40 mg at 
19 2153
    calcium acetate (PHOSLO) capsule 667 mg, 667 mg, Oral, TID WC, Negro Lr DO, 667 m
 
g at 19 1618
    calcium carbonate (TUMS) chewable tablet 1,000 mg, 1,000 mg, Oral, Q48H, 1,000 mg at 0
19 1147 **AND** magnesium chloride (MAG64) EC tablet 64 mg, 1 tablet, Oral, Q48H, Silas Ferrara MD, 64 mg at 19 1147
    carvedilol (COREG) tablet 12.5 mg, 12.5 mg, Oral, BID , Celestino Porras MD, 12.5 m
g at 19 1618
    cholecalciferol (VITAMIN D-3) tablet 1,000 Units, 1,000 Units, Oral, Daily, Negro drummond DO, 1,000 Units at 19 1148
    clopidogrel (PLAVIX) tablet 75 mg, 75 mg, Oral, Daily, Negro Lr DO, 75 mg at  1147
    dextrose 10 % infusion, , Intravenous, Continuous PRN, Negro Lr DO
    dextrose 50 % solution 12 mL, 12 mL, Intravenous, PRN, Negro Lr DO
    dextrose 50 % solution 25 mL, 25 mL, Intravenous, PRN, Negro Lr DO
    glucagon (GLUCAGEN) injection 0.5 mg, 0.5 mg, Intramuscular, PRN, Negro Lr DO
    glucagon (GLUCAGEN) injection 1 mg, 1 mg, Intramuscular, PRN, Negro Lr DO
    HYDROcodone-acetaminophen (NORCO) 5-325 MG per tablet 1 tablet, 1 tablet, Oral, Q4H OH
N, Negro Lr DO, 1 tablet at 19 0542
    insulin detemir (LEVEMIR) injection 10 Units, 10 Units, Subcutaneous, Nightly, Negro Lr DO, 10 Units at 19 2154
    insulin lispro (human) (HUMALOG) injection 0-3 Units, 0-3 Units, Subcutaneous, Nightly
, Negro Lr DO, 1 Units at 19 2243
    insulin lispro (human) (HUMALOG) injection 0-6 Units, 0-6 Units, Subcutaneous, TID AC,
 Negro Lr DO, 1 Units at 19 0538
    isosorbide mononitrate (IMDUR) 24 hr tablet 60 mg, 60 mg, Oral, Daily, STEVEN Malcolm
O, 60 mg at 19 0734
    loperamide (IMODIUM) capsule 2 mg, 2 mg, Oral, PRN, Negro Lr DO
    LORazepam (ATIVAN) tablet 1 mg, 1 mg, Oral, Q6H PRN, Silas Ferrara MD, 1 mg at 19 
1634
    losartan (COZAAR) tablet 25 mg, 25 mg, Oral, Daily, Negro Lr DO, 25 mg at 
9 1148
    nitroGLYCERIN (NITROSTAT) SL tablet 0.4 mg, 0.4 mg, Sublingual, Q5 Min PRN, Silas Ferrara MD, 0.4 mg at 19 0539
    nortriptyline (PAMELOR) capsule 25 mg, 25 mg, Oral, QAM, Silas Ferrara MD
    nortriptyline (PAMELOR) capsule 75 mg, 75 mg, Oral, Nightly, Silas Ferrara MD, 75 mg at 
19 2153
    ondansetron (ZOFRAN-ODT) disintegrating tablet 4 mg, 4 mg, Oral, Q6H PRN **OR** ondans
etron (ZOFRAN) injection 4 mg, 4 mg, Intravenous, Q6H PRN, Negro Lr DO
    oxybutynin (DITROPAN) tablet 2.5 mg, 2.5 mg, Oral, BID, Negro Lr DO, 2.5 mg at 2154
    pantoprazole (PROTONIX) EC tablet 40 mg, 40 mg, Oral, QAM AC, Negro Lr DO, 40 mg 
at 19 0542
    polyethylene glycol (GLYCOLAX) packet 17 g, 17 g, Oral, Daily PRN, Negro Lr DO
    prochlorperazine tablet 5 mg, 5 mg, Oral, BID PRN, Negro Lr DO
    rOPINIRole (REQUIP) tablet 0.75 mg, 0.75 mg, Oral, Nightly, Negro Lr DO, 0.75 mg 
at 19
 
 OBJECTIVE
 Vital Signs:
 /60 (BP Location: Right upper arm)  | Pulse 77  | Temp 98.3 F (36.8 C) (Oral)  | 
Resp 18  | Ht 1.778 m (5' 10")  | Wt 65.5 kg (144 lb 6.4 oz)  | SpO2 98%  | BMI 20.72 kg/m
 
 
 Intake/Output Summary (Last 24 hours) at 19 0714
 Last data filed at 19 2252
  Gross per 24 hour 
 Intake             2473 ml 
 Output             3500 ml 
 Net            -1027 ml 
 
 Cardiovascular: Regular rhythm, S1 normal and S2 normal.  
 
 Systolic murmur in the left sternal border. 
 Pulmonary/Chest: Effort normal and breath sounds normal. No wheezes. No rales. 
 Abdominal: Soft. No tenderness. 
 Musculoskeletal: No edema. 
 Neurological: Alert. No cranial nerve deficit. 
 Skin: Warm and dry. 
 
 DATA
 
 Recent Labs
 Lab 19
 0554 19
 0847 
  139 
 K 4.4 4.4 
 CO2 28 32 
 BUN 15 24 
 CREATININE 4.9* 6.5* 
 EGFR 9* 6* 
 
 Recent Labs
 Lab 19
 0554 19
 0847 
 WBC 4.08 4.23 
 HGB 9.7* 8.5* 
 HCT 29.7* 26.0* 
 .0* 103.8* 
 * 134* 
 
 Recent Labs
 Lab 19
 1747 19
 1411 
 TROPONINI 0.061 0.075 
 
 Lab Results 
 Component Value Date 
  CHOL 101 2017 
  TRIG 132 2017 
  LDL 41 2017 
  HDL 34 (L) 2017 
  GLUF 153 (H) 2019 
  HGBA1C 7.5 (H) 2018 
  TSH 1.14 2017 
 
 EK2019
 
 Last Echo: 2018
 Reported with normal LV size, EF 20-25%. Mild AI. Mild AS. Mild MR. Moderate TR. Moderate p
ulmonary HTN RVSP 55.9.
 Last stress test: 2018
 Reported with no evidence of ischemia. 
 Last cath: 2018 shows three-vessel coronary artery disease with widely patent stent in
 the left anterior descending artery and severe stent restenosis in the marginal branch, occ
luded right coronary artery with collateral from left to right.
 Carotid US: 
 AAA screening: 
 Lower extremity US: IR angioplasty 2018
 Angioplasty of the left common iliac artery was then performed using 6 x 40 mm angioplasty 
 
balloon.
 Drug eluting balloon angioplasty of the left SFA was then performed using 4 x 100 mm L[place]oni
x balloon.
 OTHERS: on 2018
 STATUS POST Mitral valve repair with a 28 Cristopher annuloplasty band, CABG x 2 with grafts 
to OM and RCA; endoscopic vein harvest (left greater saphenous vein) 
 2018
 S/P insertion of [employer] ICD.
 
 ASSESSMENT & PLAN
 Patient is 68 y.o. with
 1. Coronary artery disease with previous CABG X 2.
 2. Severely impaired systolic function.
 3. Ischemic and non ischemic cardiomyopathy, NYH class III, stage C.
 4. End stage renal disease on HD.
 5. Peripheral vascular disease.
 6. Osteomyelitis.
 7. Anemia due to chronic disease. 
 8. S/P mitral valve repair.
 9. Essential hypertension. 
 10. Insulin dependent diabetes mellitus.
 11. Post Op atrial fibrillation. 
 12. Hyperlipidemia. 
 
 Recommendations:
 Complex past medical history and presentation.
 Patient is chest pain free at this time, no chest pain after isosorbide. 
 Continue with carvedilol 12.5 mg bid..
 Continue with isosorbide mononitrate 60 mg. 
 Needs optimization of Cardiomyopathy treatment. 
 Consider increasing losartan if hemodynamics allow. 
 Furosemide was stopped. 
 Continue with Apixaban and Clopidogrel.
 Aspirin was stopped, anemia and high risk patient on triple therapy.
 Further recommendations will follow. 
 
 Code Status:  Full Code
 
 Celestino Porras MD
 2019 Silas Ferrara MD - 2019  8:15 AM PSTFormatting of this note may be different
 from the original.
 [facility]  
 Service:  Hospitalist
 Progress Note
 
 Hospital Day:   LOS: 0 days 
 Post-Op Day:  * No surgery found *
 SUBJECTIVE
 
      Patient Summary: refer to H&P and consult note for details 
 
      Events Overnight:  Patient seen and examined,denies CP or SOB or abdominal pain,stable
 VS,addressed all questions, HD at bed side, negative troponin.patient would rather be home 
not SNF. 
 
 Scheduled Medications
  
   apixaban  2.5 mg Oral BID 
   atorvastatin  40 mg Oral Nightly 
   calcium acetate  667 mg Oral TID WC 
 
   calcium carbonate  1,000 mg Oral Q48H 
  And 
   magnesium chloride  1 tablet Oral Q48H 
   carvedilol  12.5 mg Oral BID WC 
   cefTAZidime  2 g Intravenous After Hemodialysis (see admin instructions) 
   cholecalciferol  1,000 Units Oral Daily 
   clopidogrel  75 mg Oral Daily 
   insulin detemir  10 Units Subcutaneous Nightly 
   insulin lispro (human)  0-3 Units Subcutaneous Nightly 
   insulin lispro (human)  0-6 Units Subcutaneous TID AC 
   isosorbide mononitrate  60 mg Oral Daily 
   losartan  25 mg Oral Daily 
   nortriptyline  25-75 mg Oral See Admin Instructions 
   oxybutynin  2.5 mg Oral BID 
   pantoprazole  40 mg Oral QAM AC 
   rOPINIRole  0.75 mg Oral Nightly 
   vancomycin  500 mg Intravenous After Hemodialysis (see admin instructions) 
   Vortioxetine HBr  10 mg Oral Nightly 
 
 Continuous Infusions 
   dextrose   
 
 PRN Medications
 acetaminophen **OR** acetaminophen, albuterol, dextrose, dextrose, dextrose, glucagon, gluc
agon, HYDROcodone-acetaminophen, loperamide, LORazepam, nitroGLYCERIN, ondansetron **OR** on
dansetron, polyethylene glycol, prochlorperazine
 
 OBJECTIVE
 Vital Signs:
 /66  | Pulse 100  | Temp 98.3 F (36.8 C)  | Resp 18  | Ht 1.778 m (5' 10")  | Wt 
65.3 kg (143 lb 15.4 oz)  | SpO2 98%  | BMI 20.66 kg/m 
 
 Intake/Output Summary (Last 24 hours) at 19 1245
 Last data filed at 19 1207
  Gross per 24 hour 
 Intake             2000 ml 
 Output             3550 ml 
 Net            -1550 ml 
 
 General appearance: alert, appears stated age, cooperative, fatigued and no distress
 Head: Normocephalic, without obvious abnormality, atraumatic
 Neck: no adenopathy, no carotid bruit, no JVD, supple, symmetrical, trachea midline and thy
roid not enlarged, symmetric, no tenderness/mass/nodules
 Lungs: clear to auscultation bilaterally
 Heart: regular rate and rhythm, S1, S2 normal, no murmur, click, rub or gallop
 Abdomen: soft, non-tender; bowel sounds normal; no masses,  no organomegaly
 Extremities: extremities normal, atraumatic, no cyanosis or edema
 Pulses: 2+ and symmetric
 Neurologic: Grossly normal AXO3 non focal 
 
 DATA
 
 CBC:  
 Lab Results 
 Component Value Date 
  WBC 4.23 2019 
  RBC 2.50 (L) 2019 
  HGB 8.5 (L) 2019 
  HCT 26.0 (L) 2019 
  .8 (H) 2019 
 
  MCH 33.9 2019 
  MCHC 32.6 2019 
  RDW 66.1 (H) 2019 
   (L) 2019 
  MPV 8.7 2019 
  DIFFTYPE AUTOMATED 2019 
 
 CMP:  
 Lab Results 
 Component Value Date 
   2019 
  K 4.4 2019 
  CL 99 2019 
  CO2 32 2019 
  ANIONGAP 12 2019 
  GLUF 197 (H) 2019 
  BUN 24 2019 
  CREATININE 6.5 (H) 2019 
  BCR 4 2019 
  CA 8.7 2019 
  CA 8.7 2016 
  PROT 6.3 2019 
  ALB 2.2 (L) 2019 
  GLOB 3.7 2019 
  BILITOT 0.4 2019 
  ALP 85 2019 
  AST 23 2019 
  ALT 17 2019 
  EGFR 6 (L) 2019 
 
 Lab Results 
 Component Value Date 
  CKTOTAL 22 (L) 2019 
  CKMB 1.8 2019 
  CKMBINDEX 8.2 2019 
  TROPONINI 0.061 2019 
 
 Lab Results 
 Component Value Date 
  PHOS 4.4 2019 
 
 I have reviewed the above labs and radiology reports.
 
 PROBLEM LIST
 
 Principal Problem:
   Chest pain
 Active Problems:
   Coronary artery disease involving native coronary artery of native heart without angina p
ectoris
   ESRD (end stage renal disease) ([facility])
   Type 2 diabetes mellitus with chronic kidney disease on chronic dialysis, with long-term 
current use of insulin ([facility])
   Anemia in ESRD (end-stage renal disease) ([facility])
   Hypertension, essential
   Pleural effusion
   Chronic systolic congestive heart failure (H[facility])
   Chronic diarrhea
   Chronic anticoagulation
   Cardiomyopathy ([facility])
 
   COPD (chronic obstructive pulmonary disease) ([facility])
   ICD (implantable cardioverter-defibrillator) in place
   Paroxysmal atrial fibrillation ([facility])
   Chronic multifocal osteomyelitis of left foot ([facility])
   PVD (peripheral vascular disease) ([facility])
 
 Patient diagnosed with:  , and I agree with the following nutritional recommendations:
  
 
 ASSESSMENT & PLAN
 
 Chest pain with known CAD, hx CABG, ischemic cardiomyopathy EF 20%, chronic systolic CHF, H
TN continue current meds also I have d/w and consulted  he [facility] medical managemen
t for now 
 
 P-AFRate controlled continue BB and Eliquis.
 
 L TMA on 18 NWB LLE per , continue abx per Dr Elliott.d/w pathology bone sherri
n is clean of infection 
 
 ESRD on HD per nephrology 
 
 Anemia due to ESRD defer to nephrology 
 
 DM2 continue current insulin regimen and monitor BG adjust as needed
 
 COPD Stable, not exacerbated O2 as needed and nebs 
 
 Anxiety increase ativan to Q6 prn 
 
 GI px PPI
 
 
 PT/OT eval and tx and CW for discharge planning
 
 Review of H/P, imaging and labs, formulation of assessment and plan, discussion with the pa
tient/family, staff, and providers. 
 
 Portions of this chart may have been copied from previous notes for continuity of care purp
oses.
 
 Disposition: admitted 
 Code Status:  Full Code
 
 Silas Ferrara MD
 2019MoBobo quinonez - 2019  7:42 PM PSTPt requested visit. Pt sitting up in bed. Pt 
gave brief hx of inadequate care at previous care facility which led to being at [facility]. Chp
 provided empathetic, supportive listening, entering into pt pain & struggle. Pt talked abou
t a recent "bad dream" she'd had where a threatening presence was in her room (at previous f
acility), & pt instinctive response of calling out to God for help. Pt wondered aloud if urszula
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 [facility] pointed out that pt deep, initial
 response was to call out for God, which shows the depth of her ravinder & connection w/God. Pt
 seemed relieved at this observation. Pt not able to get out to Sabianism, but does have a "TV 
preacher" that gives her the Word & encourages her. [facility] celebrated pt having resources that 
build her up & refresh her - encouraging her to continue doing what works for her. [facility] offer
ed to pray, pt accepted. p prayed for strength, healing, continuing closeness w/sustaining
 God. Pt thanked [facility] for visit & prayer.
 
 Chaplain Bobo Berrios this encounter
 
 
 Plan of Treatment
 
 
+--------+---------+-----------+----------------------+-------------+
| Date   | Type    | Specialty | Care Team            | Description |
+--------+---------+-----------+----------------------+-------------+
| 04/10/ | Office  | Podiatry  |   Srinivasan Jordan,  |             |
| 2019   | Visit   |           | DPM  [address], |             |
|        |         |           |  [address]  [place],  |             |
|        |         |           | [place] [address]             |             |
|        |         |           | [phone]         |             |
|        |         |           | [phone] (Fax)   |             |
+--------+---------+-----------+----------------------+-------------+
 as of this encounter
 
 Procedures
 
 
+----------------------+--------+-------------+----------------------+----------------------
+
| Procedure Name       | Priori | Date/Time   | Associated Diagnosis | Comments             
|
|                      | ty     |             |                      |                      
|
+----------------------+--------+-------------+----------------------+----------------------
+
| POCT GLUCOSE         | Routin | 2019  |                      |   Results for this   
|
|                      | e      | 11:45 AM    |                      | procedure are in the 
|
|                      |        | PST         |                      |  results section.    
|
+----------------------+--------+-------------+----------------------+----------------------
+
| SEDIMENTATION RATE,  | Routin | 2019  |                      |   Results for this   
|
| AUTOMATED            | e - AM |  5:54 AM    |                      | procedure are in the 
|
|                      |        | PST         |                      |  results section.    
|
+----------------------+--------+-------------+----------------------+----------------------
+
| CBC W/MANUAL DIFF    | Routin | 2019  |                      |   Results for this   
|
|                      | e      |  5:54 AM    |                      | procedure are in the 
|
|                      |        | PST         |                      |  results section.    
|
+----------------------+--------+-------------+----------------------+----------------------
+
| C-REACTIVE PROTEIN   | Routin | 2019  |                      |   Results for this   
|
|                      | e - AM |  5:54 AM    |                      | procedure are in the 
|
|                      |        | PST         |                      |  results section.    
|
+----------------------+--------+-------------+----------------------+----------------------
+
| COMPREHENSIVE        | Routin | 2019  |                      |   Results for this   
|
 
| METABOLIC PANEL      | e - AM |  5:54 AM    |                      | procedure are in the 
|
|                      |        | PST         |                      |  results section.    
|
+----------------------+--------+-------------+----------------------+----------------------
+
| POCT GLUCOSE         | Routin | 2019  |                      |   Results for this   
|
|                      | e      |  5:22 AM    |                      | procedure are in the 
|
|                      |        | PST         |                      |  results section.    
|
+----------------------+--------+-------------+----------------------+----------------------
+
| POCT GLUCOSE         | Routin | 2019  |                      |   Results for this   
|
|                      | e      |  9:02 PM    |                      | procedure are in the 
|
|                      |        | PST         |                      |  results section.    
|
+----------------------+--------+-------------+----------------------+----------------------
+
| POCT GLUCOSE         | Routin | 2019  |                      |   Results for this   
|
|                      | e      |  4:16 PM    |                      | procedure are in the 
|
|                      |        | PST         |                      |  results section.    
|
+----------------------+--------+-------------+----------------------+----------------------
+
| POCT GLUCOSE         | Routin | 2019  |                      |   Results for this   
|
|                      | e      | 12:17 PM    |                      | procedure are in the 
|
|                      |        | PST         |                      |  results section.    
|
+----------------------+--------+-------------+----------------------+----------------------
+
| CBC W/AUTO DIFF      | STAT   | 2019  |                      |   Results for this   
|
| (REFLEX TO MANUAL)   |        |  8:47 AM    |                      | procedure are in the 
|
|                      |        | PST         |                      |  results section.    
|
+----------------------+--------+-------------+----------------------+----------------------
+
| RENAL FUNCTION PANEL | STAT   | 2019  |                      |   Results for this   
|
|                      |        |  8:47 AM    |                      | procedure are in the 
|
|                      |        | PST         |                      |  results section.    
|
+----------------------+--------+-------------+----------------------+----------------------
+
| EKG STANDARD 12 LEAD | Routin | 2019  |                      |   Results for this   
|
|                      | e      |  5:50 AM    |                      | procedure are in the 
|
|                      |        | PST         |                      |  results section.    
|
 
+----------------------+--------+-------------+----------------------+----------------------
+
| POCT GLUCOSE         | Routin | 2019  |                      |   Results for this   
|
|                      | e      |  5:18 AM    |                      | procedure are in the 
|
|                      |        | PST         |                      |  results section.    
|
+----------------------+--------+-------------+----------------------+----------------------
+
| POCT GLUCOSE         | Routin | 2019  |                      |   Results for this   
|
|                      | e      | 10:42 PM    |                      | procedure are in the 
|
|                      |        | PST         |                      |  results section.    
|
+----------------------+--------+-------------+----------------------+----------------------
+
| POCT GLUCOSE         | Routin | 2019  |                      |   Results for this   
|
|                      | e      |  9:16 PM    |                      | procedure are in the 
|
|                      |        | PST         |                      |  results section.    
|
+----------------------+--------+-------------+----------------------+----------------------
+
| TROPONIN I           | Timed  | 2019  |                      |   Results for this   
|
|                      |        |  5:47 PM    |                      | procedure are in the 
|
|                      |        | PST         |                      |  results section.    
|
+----------------------+--------+-------------+----------------------+----------------------
+
| POCT GLUCOSE         | Routin | 2019  |                      |   Results for this   
|
|                      | e      |  4:56 PM    |                      | procedure are in the 
|
|                      |        | PST         |                      |  results section.    
|
+----------------------+--------+-------------+----------------------+----------------------
+
| EKG STANDARD 12 LEAD | STAT   | 2019  |                      |   Results for this   
|
|                      |        |  3:01 PM    |                      | procedure are in the 
|
|                      |        | PST         |                      |  results section.    
|
+----------------------+--------+-------------+----------------------+----------------------
+
| TROPONIN I           | Timed  | 2019  |                      |   Results for this   
|
|                      |        |  2:11 PM    |                      | procedure are in the 
|
|                      |        | PST         |                      |  results section.    
|
+----------------------+--------+-------------+----------------------+----------------------
+
| ED ISTAT TROPONIN    | STAT   | 2019  |                      |   Results for this   
|
 
|                      |        | 10:26 AM    |                      | procedure are in the 
|
|                      |        | PST         |                      |  results section.    
|
+----------------------+--------+-------------+----------------------+----------------------
+
| POC CARDIAC TROPONIN | Routin | 2019  |                      |   Results for this   
|
|                      | e      |  9:54 AM    |                      | procedure are in the 
|
|                      |        | PST         |                      |  results section.    
|
+----------------------+--------+-------------+----------------------+----------------------
+
| ED INFORMATION       | Routin | 2019  |                      |   Results for this   
|
| EXCHANGE             | e      |  9:47 AM    |                      | procedure are in the 
|
|                      |        | PST         |                      |  results section.    
|
+----------------------+--------+-------------+----------------------+----------------------
+
| EKG 12 LEAD UNIT     | Routin | 2019  |                      |   Results for this   
|
| PERFORMED            | e      |  9:44 AM    |                      | procedure are in the 
|
|                      |        | PST         |                      |  results section.    
|
+----------------------+--------+-------------+----------------------+----------------------
+
 in this encounter
 
 Results
 POCT glucose (2019 11:45 AM)
 
+--------------------+--------------------------+---------------+-----------------+
| Component          | Value                    | Ref Range     | Performed At    |
+--------------------+--------------------------+---------------+-----------------+
| GLUCOSE,POC SCREEN | 225 (H)Comment: Testing  | 65 - 99 mg/dL | [facility] |
|                    | performed at [facility];[address]     |               |                 |
|                    | [address];[place]   |               |                 |
|                    | [id number]                    |               |                 |
+--------------------+--------------------------+---------------+-----------------+
 
 
 
+-------------------+------------------+--------------------+--------------+
| Performing        | Address          | City/State/Zipcode | Phone Number |
| Organization      |                  |                    |              |
+-------------------+------------------+--------------------+--------------+
|   [facility] |   [address] | [place] [address] |              |
+-------------------+------------------+--------------------+--------------+
 CBC w/manual diff (2019  5:54 AM)
 
+----------------------+--------------------------------------------------------------------
-----+-------------------+--------------+
| Component            | Value                                                              
     | Ref Range         | Performed At |
+----------------------+--------------------------------------------------------------------
-----+-------------------+--------------+
 
| WBC                  | 4.08                                                               
     | 3.80 - 11.00 K/uL | [facility]   |
|                      |                                                                    
     |                   | LABORATORY   |
+----------------------+--------------------------------------------------------------------
-----+-------------------+--------------+
| RBC                  | 2.83 (L)                                                           
     | 3.70 - 5.10 M/uL  | [facility]   |
|                      |                                                                    
     |                   | LABORATORY   |
+----------------------+--------------------------------------------------------------------
-----+-------------------+--------------+
| HGB                  | 9.7 (L)                                                            
     | 11.3 - 15.5 g/dL  | [facility]   |
|                      |                                                                    
     |                   | LABORATORY   |
+----------------------+--------------------------------------------------------------------
-----+-------------------+--------------+
| HCT                  | 29.7 (L)                                                           
     | 34.0 - 46.0 %     | [facility]   |
|                      |                                                                    
     |                   | LABORATORY   |
+----------------------+--------------------------------------------------------------------
-----+-------------------+--------------+
| MCV                  | 105.0 (H)                                                          
     | 80.0 - 100.0 fl   | [facility]   |
|                      |                                                                    
     |                   | LABORATORY   |
+----------------------+--------------------------------------------------------------------
-----+-------------------+--------------+
| MCH                  | 34.2 (H)                                                           
     | 27.0 - 34.0 pg    | [facility]   |
|                      |                                                                    
     |                   | LABORATORY   |
+----------------------+--------------------------------------------------------------------
-----+-------------------+--------------+
| MCHC                 | 32.5                                                               
     | 32.0 - 35.5 g/dL  | [facility]   |
|                      |                                                                    
     |                   | LABORATORY   |
+----------------------+--------------------------------------------------------------------
-----+-------------------+--------------+
| RDW SD               | 64.3 (H)                                                           
     | 37 - 53 fl        | [facility]   |
|                      |                                                                    
     |                   | LABORATORY   |
+----------------------+--------------------------------------------------------------------
-----+-------------------+--------------+
| PLT                  | 125 (L)                                                            
     | 150 - 400 K/uL    | [facility]   |
|                      |                                                                    
     |                   | LABORATORY   |
+----------------------+--------------------------------------------------------------------
-----+-------------------+--------------+
| MPV                  | 9.5                                                                
     | fl                | [facility]   |
|                      |                                                                    
     |                   | LABORATORY   |
+----------------------+--------------------------------------------------------------------
-----+-------------------+--------------+
 
| DIFF TYPE            | MANUAL                                                             
     |                   | [facility]   |
|                      |                                                                    
     |                   | LABORATORY   |
+----------------------+--------------------------------------------------------------------
-----+-------------------+--------------+
| Neutrophils Manual   | 77                                                                 
     | %                 | [facility]   |
|                      |                                                                    
     |                   | LABORATORY   |
+----------------------+--------------------------------------------------------------------
-----+-------------------+--------------+
| Lymphocytes Manual   | 15                                                                 
     | %                 | [facility]   |
|                      |                                                                    
     |                   | LABORATORY   |
+----------------------+--------------------------------------------------------------------
-----+-------------------+--------------+
| Monocytes Manual     | 8                                                                  
     | %                 | [facility]   |
|                      |                                                                    
     |                   | LABORATORY   |
+----------------------+--------------------------------------------------------------------
-----+-------------------+--------------+
| Neutrophils Absolute | 3.14                                                               
     | 1.90 - 7.40 K/uL  | [facility]   |
|                      |                                                                    
     |                   | LABORATORY   |
+----------------------+--------------------------------------------------------------------
-----+-------------------+--------------+
| Lymphocytes Absolute | 0.61 (L)                                                           
     | 1.00 - 3.90 K/uL  | [facility]   |
|                      |                                                                    
     |                   | LABORATORY   |
+----------------------+--------------------------------------------------------------------
-----+-------------------+--------------+
| Monocytes Absolute   | 0.33                                                               
     | 0.00 - 0.80 K/uL  | [facility]   |
|                      |                                                                    
     |                   | LABORATORY   |
+----------------------+--------------------------------------------------------------------
-----+-------------------+--------------+
| MORPHOLOGY           | 1+Comment:                                                         
     |                   | [facility]   |
|                      | ANISO1+HYPONORMAL PLT                                              
     |                   | LABORATORY   |
|                      | MORPHTesting performed                                             
     |                   |              |
|                      | at [facility] [address]                                                     
     |                   |              |
|                      | [facility]                                                   
     |                   |              |
|                      | [place]    [address]                                             
     |                   |              |
|                      |Testing performed at [facility], [address], [place]    [phone] |                   |              |
|                      |                                                                    
     |                   |              |
+----------------------+--------------------------------------------------------------------
-----+-------------------+--------------+
 
 
 
 
+---------------+-------------------------+---------------------+----------------+
| Performing    | Address                 | City/State/Zipcode  | Phone Number   |
| Organization  |                         |                     |                |
+---------------+-------------------------+---------------------+----------------+
|   [facility]  |   [address]  | [place] [address] |   [phone] |
| LABORATORY    | [place]                   |                     |                |
+---------------+-------------------------+---------------------+----------------+
 Sedimentation rate, automated (2019  5:54 AM)
 
+-----------+--------------------------+--------------+--------------+
| Component | Value                    | Ref Range    | Performed At |
+-----------+--------------------------+--------------+--------------+
| ESR       | 24Comment: Testing       | 0 - 30 mm/Hr | [facility]   |
|           | performed at [facility], [address] |              | LABORATORY   |
|           |  [address],        |              |              |
|           | [place]  [id number]     |              |              |
+-----------+--------------------------+--------------+--------------+
 
 
 
+----------+
| Specimen |
+----------+
| Blood    |
+----------+
 
 
 
+---------------+-------------------------+---------------------+----------------+
| Performing    | Address                 | City/State/Zipcode  | Phone Number   |
| Organization  |                         |                     |                |
+---------------+-------------------------+---------------------+----------------+
|   [facility]  |   [address] [place] [address]  | [place] [address] |   [phone] |
| LABORATORY    | [address]                   |                     |                |
+---------------+-------------------------+---------------------+----------------+
 C-reactive protein (2019  5:54 AM)
 
+-----------+--------------------------+------------+--------------+
| Component | Value                    | Ref Range  | Performed At |
+-----------+--------------------------+------------+--------------+
| CRP       | <0.3Comment: Testing     | <0.5 mg/dL | [facility]   |
|           | performed at [facility], [address] |            | LABORATORY   |
|           |  [facility],        |            |              |
|           | [place]  [address]     |            |              |
+-----------+--------------------------+------------+--------------+
 
 
 
+----------+
| Specimen |
+----------+
| Blood    |
+----------+
 
 
 
+---------------+-------------------------+---------------------+----------------+
 
| Performing    | Address                 | City/State/Zipcode  | Phone Number   |
| Organization  |                         |                     |                |
+---------------+-------------------------+---------------------+----------------+
|   [facility]  |   [address]  | [place] [address] |   [phone] |
| LABORATORY    | [address]                   |                     |                |
+---------------+-------------------------+---------------------+----------------+
 Comprehensive metabolic panel (2019  5:54 AM)
 
+----------------+--------------------------+-------------------+--------------+
| Component      | Value                    | Ref Range         | Performed At |
+----------------+--------------------------+-------------------+--------------+
| SODIUM         | 138                      | 135 - 145 mmol/L  | [facility]   |
|                |                          |                   | LABORATORY   |
+----------------+--------------------------+-------------------+--------------+
| POTASSIUM      | 4.4                      | 3.5 - 4.9 mmol/L  | [facility]   |
|                |                          |                   | LABORATORY   |
+----------------+--------------------------+-------------------+--------------+
| CHLORIDE       | 100                      | 99 - 109 mmol/L   | [facility]   |
|                |                          |                   | LABORATORY   |
+----------------+--------------------------+-------------------+--------------+
| CO2            | 28                       | 23 - 32 mmol/L    | [facility]   |
|                |                          |                   | LABORATORY   |
+----------------+--------------------------+-------------------+--------------+
| ANION GAP AGAP | 14                       | 5 - 20 mmol/L     | [facility]   |
|                |                          |                   | LABORATORY   |
+----------------+--------------------------+-------------------+--------------+
| GLUCOSE        | 153 (H)                  | 65 - 99 mg/dL     | [facility]   |
|                |                          |                   | LABORATORY   |
+----------------+--------------------------+-------------------+--------------+
| BUN            | 15                       | 8 - 25 mg/dL      | [facility]   |
|                |                          |                   | LABORATORY   |
+----------------+--------------------------+-------------------+--------------+
| CREATININE     | 4.9 (H)                  | 0.50 - 1.00 mg/dL | [facility]   |
|                |                          |                   | LABORATORY   |
+----------------+--------------------------+-------------------+--------------+
| BUN/CREAT      | 3                        |                   | [facility]   |
|                |                          |                   | LABORATORY   |
+----------------+--------------------------+-------------------+--------------+
| CALCIUM        | 9.6                      | 8.5 - 10.5 mg/dL  | [facility]   |
|                |                          |                   | LABORATORY   |
+----------------+--------------------------+-------------------+--------------+
| TOTAL PROTEIN  | 6.1 (L)                  | 6.3 - 8.2 g/dL    | [facility]   |
|                |                          |                   | LABORATORY   |
+----------------+--------------------------+-------------------+--------------+
| Albumin        | 2.6 (L)                  | 3.3 - 4.8 g/dL    | [facility]   |
|                |                          |                   | LABORATORY   |
+----------------+--------------------------+-------------------+--------------+
| GLOBULIN       | 3.5                      | 1.3 - 4.9 g/dL    | [facility]   |
|                |                          |                   | LABORATORY   |
+----------------+--------------------------+-------------------+--------------+
| A/G            | 0.7 (L)                  | 1.0 - 2.4         | [facility]   |
|                |                          |                   | LABORATORY   |
+----------------+--------------------------+-------------------+--------------+
| TBIL           | 0.5                      | 0.1 - 1.5 mg/dL   | [facility]   |
|                |                          |                   | LABORATORY   |
+----------------+--------------------------+-------------------+--------------+
| ALK PHOS       | 85                       | 35 - 115 U/L      | [facility]   |
|                |                          |                   | LABORATORY   |
+----------------+--------------------------+-------------------+--------------+
| AST            | 22                       | 10 - 45 U/L       | [facility]   |
 
|                |                          |                   | LABORATORY   |
+----------------+--------------------------+-------------------+--------------+
| ALT            | 22                       | 10 - 65 U/L       | [facility]   |
|                |                          |                   | LABORATORY   |
+----------------+--------------------------+-------------------+--------------+
| EGFR           | 9 (L)Comment: GFR <60:   | >60 mL/min/1.73m2 | [facility]   |
|                | CHRONIC KIDNEY DISEASE,  |                   | LABORATORY   |
|                | IF FOUND OVER A 3 MONTH  |                   |              |
|                | PERIOD.GFR <15: KIDNEY   |                   |              |
|                | FAILURE.FOR       |                   |              |
|                | [employer], MULTIPLY THE  |                   |              |
|                | CALCULATED GFR BY        |                   |              |
|                | 1.210.This eGFR is       |                   |              |
|                | calculated using the     |                   |              |
|                | MDRD IDMS traceable      |                   |              |
|                | equation.Testing         |                   |              |
|                | performed at [facility], [address] |                   |              |
|                |  [facility],        |                   |              |
|                | [place]    [address]   |                   |              |
+----------------+--------------------------+-------------------+--------------+
 
 
 
+----------+
| Specimen |
+----------+
| Blood    |
+----------+
 
 
 
+---------------+-------------------------+---------------------+----------------+
| Performing    | Address                 | City/State/Zipcode  | Phone Number   |
| Organization  |                         |                     |                |
+---------------+-------------------------+---------------------+----------------+
|   [facility]  |   [address] [place] [address]  | [place] [address] |   [phone] |
| LABORATORY    | [address]                   |                     |                |
+---------------+-------------------------+---------------------+----------------+
 POCT glucose (2019  5:22 AM)
 
+--------------------+--------------------------+---------------+-----------------+
| Component          | Value                    | Ref Range     | Performed At    |
+--------------------+--------------------------+---------------+-----------------+
| GLUCOSE,POC SCREEN | 131 (H)Comment: Testing  | 65 - 99 mg/dL | [facility] |
|                    | performed at [facility]     |               |                 |
|                    | [address];[place]   |               |                 |
|                    | [id number]                    |               |                 |
+--------------------+--------------------------+---------------+-----------------+
 
 
 
+-------------------+------------------+--------------------+--------------+
| Performing        | Address          | City/State/Zipcode | Phone Number |
| Organization      |                  |                    |              |
+-------------------+------------------+--------------------+--------------+
|   [facility] |   [address] | [place] [address] |              |
+-------------------+------------------+--------------------+--------------+
 POCT glucose (2019  9:02 PM)
 
+--------------------+--------------------------+---------------+-----------------+
 
| Component          | Value                    | Ref Range     | Performed At    |
+--------------------+--------------------------+---------------+-----------------+
| GLUCOSE,POC SCREEN | 164 (H)Comment: Testing  | 65 - 99 mg/dL | [facility] |
|                    | performed at [facility]     |               |                 |
|                    | [address];[place], [place]   |               |                 |
|                    | [address]                    |               |                 |
+--------------------+--------------------------+---------------+-----------------+
 
 
 
+-------------------+------------------+--------------------+--------------+
| Performing        | Address          | City/State/Zipcode | Phone Number |
| Organization      |                  |                    |              |
+-------------------+------------------+--------------------+--------------+
|   [facility] |   [address] | [place] [address] |              |
+-------------------+------------------+--------------------+--------------+
 POCT glucose (2019  4:16 PM)
 
+--------------------+--------------------------+---------------+-----------------+
| Component          | Value                    | Ref Range     | Performed At    |
+--------------------+--------------------------+---------------+-----------------+
| GLUCOSE,POC SCREEN | 143 (H)Comment: Testing  | 65 - 99 mg/dL | [facility] |
|                    | performed at [facility]     |               |                 |
|                    | [address];[place]   |               |                 |
|                    | [address]                    |               |                 |
+--------------------+--------------------------+---------------+-----------------+
 
 
 
+-------------------+------------------+--------------------+--------------+
| Performing        | Address          | City/State/Zipcode | Phone Number |
| Organization      |                  |                    |              |
+-------------------+------------------+--------------------+--------------+
|   [facility] |   [address] | [place] [address] |              |
+-------------------+------------------+--------------------+--------------+
 POCT glucose (2019 12:17 PM)
 
+--------------------+--------------------------+---------------+-----------------+
| Component          | Value                    | Ref Range     | Performed At    |
+--------------------+--------------------------+---------------+-----------------+
| GLUCOSE,POC SCREEN | 129 (H)Comment: Testing  | 65 - 99 mg/dL | [facility] |
|                    | performed at [facility];[address]     |               |                 |
|                    | [address];[place]   |               |                 |
|                    | [address]                    |               |                 |
+--------------------+--------------------------+---------------+-----------------+
 
 
 
+-------------------+------------------+--------------------+--------------+
| Performing        | Address          | City/State/Zipcode | Phone Number |
| Organization      |                  |                    |              |
+-------------------+------------------+--------------------+--------------+
|   [facility] |   [address] | [place] [place] [place] [address] |              |
+-------------------+------------------+--------------------+--------------+
 Renal function panel (2019  8:47 AM)
 
+----------------+--------------------------+-------------------+-----------------+
| Component      | Value                    | Ref Range         | Performed At    |
+----------------+--------------------------+-------------------+-----------------+
| SODIUM         | 139                      | 135 - 145 mmol/L  | [facility] |
 
+----------------+--------------------------+-------------------+-----------------+
| POTASSIUM      | 4.4                      | 3.5 - 4.9 mmol/L  | [facility] |
+----------------+--------------------------+-------------------+-----------------+
| CHLORIDE       | 99                       | 99 - 109 mmol/L   | [facility] |
+----------------+--------------------------+-------------------+-----------------+
| CO2            | 32                       | 23 - 32 mmol/L    | [facility] |
+----------------+--------------------------+-------------------+-----------------+
| ANION GAP AGAP | 12                       | 5 - 20 mmol/L     | [facility] |
+----------------+--------------------------+-------------------+-----------------+
| GLUCOSE        | 197 (H)                  | 65 - 99 mg/dL     | [facility] |
+----------------+--------------------------+-------------------+-----------------+
| BUN            | 24                       | 8 - 25 mg/dL      | [facility] |
+----------------+--------------------------+-------------------+-----------------+
| CREATININE     | 6.5 (H)                  | 0.50 - 1.00 mg/dL | [facility] |
+----------------+--------------------------+-------------------+-----------------+
| CALCIUM        | 8.7                      | 8.5 - 10.5 mg/dL  | [facility] |
+----------------+--------------------------+-------------------+-----------------+
| Albumin        | 2.2 (L)                  | 3.3 - 4.8 g/dL    | [facility] |
+----------------+--------------------------+-------------------+-----------------+
| PHOSPHORUS     | 4.4                      | 2.3 - 4.8 mg/dL   | [facility] |
+----------------+--------------------------+-------------------+-----------------+
| EGFR           | 6 (L)Comment: GFR <60:   | >60 mL/min/1.73m2 | [facility] |
|                | CHRONIC KIDNEY DISEASE,  |                   |                 |
|                | IF FOUND OVER A 3 MONTH  |                   |                 |
|                | PERIOD.GFR <15: KIDNEY   |                   |                 |
|                | FAILURE.FOR       |                   |                 |
|                | [employer]IC[employer], MULTIPLY THE  |                   |                 |
|                | CALCULATED GFR BY        |                   |                 |
|                | 1.210.This eGFR is       |                   |                 |
|                | calculated using the     |                   |                 |
|                | MDRD IDMS traceable      |                   |                 |
|                | equation.Testing         |                   |                 |
|                | performed at [facility]     |                   |                 |
|                | [facility];[place], [place]   |                   |                 |
|                | [id number]                    |                   |                 |
+----------------+--------------------------+-------------------+-----------------+
 
 
 
+----------+
| Specimen |
+----------+
| Blood    |
+----------+
 
 
 
+-------------------+------------------+--------------------+--------------+
| Performing        | Address          | City/State/Zipcode | Phone Number |
| Organization      |                  |                    |              |
+-------------------+------------------+--------------------+--------------+
|   [facility] |   [address] [place] [address] | [place] [address] |              |
+-------------------+------------------+--------------------+--------------+
 CBC w/auto diff (reflex to manual) (2019  8:47 AM)
 
+-----------------+-------------------------+-------------------+-----------------+
| Component       | Value                   | Ref Range         | Performed At    |
+-----------------+-------------------------+-------------------+-----------------+
| WBC             | 4.23                    | 3.80 - 11.00 K/uL | [facility] |
+-----------------+-------------------------+-------------------+-----------------+
 
| RBC             | 2.50 (L)                | 3.70 - 5.10 M/uL  | [facility] |
+-----------------+-------------------------+-------------------+-----------------+
| HGB             | 8.5 (L)                 | 11.3 - 15.5 g/dL  | [facility] |
+-----------------+-------------------------+-------------------+-----------------+
| HCT             | 26.0 (L)                | 34.0 - 46.0 %     | [facility] |
+-----------------+-------------------------+-------------------+-----------------+
| MCV             | 103.8 (H)               | 80.0 - 100.0 fl   | [facility] |
+-----------------+-------------------------+-------------------+-----------------+
| MCH             | 33.9                    | 27.0 - 34.0 pg    | [facility] |
+-----------------+-------------------------+-------------------+-----------------+
| MCHC            | 32.6                    | 32.0 - 35.5 g/dL  | [facility] |
+-----------------+-------------------------+-------------------+-----------------+
| RDW SD          | 66.1 (H)                | 37 - 53 fl        | [facility] |
+-----------------+-------------------------+-------------------+-----------------+
| PLT             | 134 (L)                 | 150 - 400 K/uL    | [facility] |
+-----------------+-------------------------+-------------------+-----------------+
| MPV             | 8.7                     | fl                | [facility] |
+-----------------+-------------------------+-------------------+-----------------+
| DIFF TYPE       | AUTOMATED               |                   | [facility] |
+-----------------+-------------------------+-------------------+-----------------+
| NEUTROPHILS     | 74.02                   | %                 | [facility] |
+-----------------+-------------------------+-------------------+-----------------+
| LYMPHOCYTES     | 16.94                   | %                 | [facility] |
+-----------------+-------------------------+-------------------+-----------------+
| MONOCYTES       | 7.65                    | %                 | [facility] |
+-----------------+-------------------------+-------------------+-----------------+
| EOSINOPHILS     | 0.00                    | %                 | [facility] |
+-----------------+-------------------------+-------------------+-----------------+
| BASOPHILS       | 1.39                    | %                 | [facility] |
+-----------------+-------------------------+-------------------+-----------------+
| NEUTROPHILS ABS | 3.13                    | 1.90 - 7.40 K/uL  | [facility] |
+-----------------+-------------------------+-------------------+-----------------+
| LYMPHOCYTES ABS | 0.72 (L)                | 1.00 - 3.90 K/uL  | [facility] |
+-----------------+-------------------------+-------------------+-----------------+
| MONOCYTES ABS   | 0.32                    | 0.00 - 0.80 K/uL  | [facility] |
+-----------------+-------------------------+-------------------+-----------------+
| EOSINOPHILS ABS | 0.00                    | 0.00 - 0.50 K/uL  | [facility] |
+-----------------+-------------------------+-------------------+-----------------+
| BASOPHILS ABS   | 0.06Comment: Testing    | 0.00 - 0.10 K/uL  | [facility] |
|                 | performed at [facility]    |                   |                 |
|                 | [address];[place]  |                   |                 |
|                 | [id number]                   |                   |                 |
+-----------------+-------------------------+-------------------+-----------------+
 
 
 
+----------+
| Specimen |
+----------+
| Blood    |
+----------+
 
 
 
+-------------------+------------------+--------------------+--------------+
| Performing        | Address          | City/State/Zipcode | Phone Number |
| Organization      |                  |                    |              |
+-------------------+------------------+--------------------+--------------+
|   [facility] |   [address] | [place] [address] |              |
+-------------------+------------------+--------------------+--------------+
 
 EKG STANDARD 12 LEAD (2019  5:50 AM)
 
+-------------------+--------------------------+-----------+--------------+
| Component         | Value                    | Ref Range | Performed At |
+-------------------+--------------------------+-----------+--------------+
| Ventricular Rate  | 87                       | BPM       | [facility]     |
+-------------------+--------------------------+-----------+--------------+
| Atrial Rate       | 87                       | BPM       | [facility]     |
+-------------------+--------------------------+-----------+--------------+
| P-R Interval      | 138                      | ms        | [facility]     |
+-------------------+--------------------------+-----------+--------------+
| QRS Duration      | 122                      | ms        | [facility]     |
+-------------------+--------------------------+-----------+--------------+
| Q-T Interval      | 420                      | ms        | [facility]     |
+-------------------+--------------------------+-----------+--------------+
| QTC Calculation   | 505                      | ms        | [facility]     |
| (Bezet)           |                          |           |              |
+-------------------+--------------------------+-----------+--------------+
| Calculated P Axis | 69                       | degrees   | [facility]     |
+-------------------+--------------------------+-----------+--------------+
| Calculated R Axis | -28                      | degrees   | [facility]     |
+-------------------+--------------------------+-----------+--------------+
| Calculated T Axis | 67                       | degrees   | [facility]     |
+-------------------+--------------------------+-----------+--------------+
| Diagnosis         | Normal sinus             |           | [facility]     |
|                   | rhythmNon-specific       |           |              |
|                   | intra-ventricular        |           |              |
|                   | conduction               |           |              |
|                   | delayNonspecific ST      |           |              |
|                   | abnormalityPoor R -      |           |              |
|                   | progressionWhen compared |           |              |
|                   |  with ECG of 2019 |           |              |
|                   |  15:01,Premature         |           |              |
|                   | ventricular complexes    |           |              |
|                   | are no longer PresentT   |           |              |
|                   | wave inversion no longer |           |              |
|                   |  evident in Inferior     |           |              |
|                   | leadsConfirmed by JOVITA,  |           |              |
|                   | JACEY ([id number]) on 2019 |           |              |
|                   |  4:19:02 PM              |           |              |
+-------------------+--------------------------+-----------+--------------+
 
 
 
+--------------+-------------------+--------------------+--------------+
| Performing   | Address           | City/State/Zipcode | Phone Number |
| Organization |                   |                    |              |
+--------------+-------------------+--------------------+--------------+
|   [facility]   |   [address] | [place] [address] |              |
+--------------+-------------------+--------------------+--------------+
 POCT glucose (2019  5:18 AM)
 
+--------------------+--------------------------+---------------+-----------------+
| Component          | Value                    | Ref Range     | Performed At    |
+--------------------+--------------------------+---------------+-----------------+
| GLUCOSE,POC SCREEN | 167 (H)Comment: Testing  | 65 - 99 mg/dL | [facility] |
|                    | performed at [facility]     |               |                 |
|                    | [address];[place]   |               |                 |
|                    | [id number]                    |               |                 |
+--------------------+--------------------------+---------------+-----------------+
 
 
 
 
+-------------------+------------------+--------------------+--------------+
| Performing        | Address          | City/State/Zipcode | Phone Number |
| Organization      |                  |                    |              |
+-------------------+------------------+--------------------+--------------+
|   [facility] |   [address] | [place] [address] |              |
+-------------------+------------------+--------------------+--------------+
 POCT glucose (2019 10:42 PM)
 
+--------------------+--------------------------+---------------+-----------------+
| Component          | Value                    | Ref Range     | Performed At    |
+--------------------+--------------------------+---------------+-----------------+
| GLUCOSE,POC SCREEN | 207 (H)Comment: Testing  | 65 - 99 mg/dL | [facility] |
|                    | performed at [facility];[address]     |               |                 |
|                    | [address];[place]   |               |                 |
|                    | [id number]                    |               |                 |
+--------------------+--------------------------+---------------+-----------------+
 
 
 
+-------------------+------------------+--------------------+--------------+
| Performing        | Address          | City/State/Zipcode | Phone Number |
| Organization      |                  |                    |              |
+-------------------+------------------+--------------------+--------------+
|   [facility] |   [address] | [place] [address] |              |
+-------------------+------------------+--------------------+--------------+
 POCT glucose (2019  9:16 PM)
 
+--------------------+--------------------------+---------------+-----------------+
| Component          | Value                    | Ref Range     | Performed At    |
+--------------------+--------------------------+---------------+-----------------+
| GLUCOSE,POC SCREEN | 213 (H)Comment: Testing  | 65 - 99 mg/dL | [facility] |
|                    | performed at [facility]     |               |                 |
|                    | [address];[place]   |               |                 |
|                    | [address]                    |               |                 |
+--------------------+--------------------------+---------------+-----------------+
 
 
 
+-------------------+------------------+--------------------+--------------+
| Performing        | Address          | City/State/Zipcode | Phone Number |
| Organization      |                  |                    |              |
+-------------------+------------------+--------------------+--------------+
|   [facility] |   [address] | [facility] [place] [address] |              |
+-------------------+------------------+--------------------+--------------+
 Troponin I (2019  5:47 PM)
 
+------------+--------------------------+-------------------+-----------------+
| Component  | Value                    | Ref Range         | Performed At    |
+------------+--------------------------+-------------------+-----------------+
| TROPONIN I | 0.061Comment:   0.00 to  | 0.00 - 0.10 ng/mL | [facility] |
|            | 0.10     CONSISTENT WITH |                   |                 |
|            |  NORMAL POPULATION0.11   |                   |                 |
|            | to 0.60     CONSISTENT   |                   |                 |
|            | WITH INCREASED RISK FOR  |                   |                 |
|            | ADVERSE OUTCOMES>        |                   |                 |
|            | 0.60                     |                   |                 |
|            | CONSISTENT WITH WHO      |                   |                 |
 
|            | CRITERIA FOR ACUTE MI    |                   |                 |
|            | Testing performed at     |                   |                 |
|            | [facility];[address]            |                   |                 |
|            [address];[place] [address]   |                   |                 |
+------------+--------------------------+-------------------+-----------------+
 
 
 
+----------+
| Specimen |
+----------+
| Blood    |
+----------+
 
 
 
+-------------------+------------------+--------------------+--------------+
| Performing        | Address          | City/State/Zipcode | Phone Number |
| Organization      |                  |                    |              |
+-------------------+------------------+--------------------+--------------+
|   [facility] |   [address] | [place] [address] |              |
+-------------------+------------------+--------------------+--------------+
 POCT glucose (2019  4:56 PM)
 
+--------------------+--------------------------+---------------+-----------------+
| Component          | Value                    | Ref Range     | Performed At    |
+--------------------+--------------------------+---------------+-----------------+
| GLUCOSE,POC SCREEN | 133 (H)Comment: Testing  | 65 - 99 mg/dL | [facility] |
|                    | performed at [facility];[address]     |               |                 |
|                    | [address];[place]   |               |                 |
|                    | [address]                    |               |                 |
+--------------------+--------------------------+---------------+-----------------+
 
 
 
+-------------------+------------------+--------------------+--------------+
| Performing        | Address          | City/State/Zipcode | Phone Number |
| Organization      |                  |                    |              |
+-------------------+------------------+--------------------+--------------+
|   [facility] |   [address] | [place] [address] |              |
+-------------------+------------------+--------------------+--------------+
 EKG STANDARD 12 LEAD (2019  3:01 PM)
 
+-------------------+--------------------------+-----------+--------------+
| Component         | Value                    | Ref Range | Performed At |
+-------------------+--------------------------+-----------+--------------+
| Ventricular Rate  | 86                       | BPM       | [facility]     |
+-------------------+--------------------------+-----------+--------------+
| Atrial Rate       | 86                       | BPM       | [facility]     |
+-------------------+--------------------------+-----------+--------------+
| P-R Interval      | 138                      | ms        | [facility]     |
+-------------------+--------------------------+-----------+--------------+
| QRS Duration      | 120                      | ms        | [facility]     |
+-------------------+--------------------------+-----------+--------------+
| Q-T Interval      | 450                      | ms        | [facility]     |
+-------------------+--------------------------+-----------+--------------+
| QTC Calculation   | 538                      | ms        | [facility]     |
| (Bezet)           |                          |           |              |
+-------------------+--------------------------+-----------+--------------+
| Calculated P Axis | 60                       | degrees   | [facility]     |
 
+-------------------+--------------------------+-----------+--------------+
| Calculated R Axis | -31                      | degrees   | [facility]     |
+-------------------+--------------------------+-----------+--------------+
| Calculated T Axis | -32                      | degrees   | [facility]     |
+-------------------+--------------------------+-----------+--------------+
| Diagnosis         | Sinus rhythm with        |           | [facility]     |
|                   | frequent Premature       |           |              |
|                   | ventricular              |           |              |
|                   | complexesLeft axis       |           |              |
|                   | deviationLeft bundle     |           |              |
|                   | branch block,            |           |              |
|                   | completeNonspecific ST   |           |              |
|                   | and T wave               |           |              |
|                   | abnormalityAbnormal      |           |              |
|                   | ECGWhen compared with    |           |              |
|                   | ECG of 2019       |           |              |
|                   | 09:44,Premature          |           |              |
|                   | ventricular complexes    |           |              |
|                   | are now PresentT wave    |           |              |
|                   | inversion now evident in |           |              |
|                   |  Inferior leadsConfirmed |           |              |
|                   |  by JACEY HUSSEIN (209) on |           |              |
|                   |  2019 4:46:52 PM    |           |              |
+-------------------+--------------------------+-----------+--------------+
 
 
 
+--------------+-------------------+--------------------+--------------+
| Performing   | Address           | City/State/Zipcode | Phone Number |
| Organization |                   |                    |              |
+--------------+-------------------+--------------------+--------------+
|   [facility]   |   [address] | [place] [address] |              |
+--------------+-------------------+--------------------+--------------+
 Troponin I (2019  2:11 PM)
 
+------------+--------------------------+-------------------+-----------------+
| Component  | Value                    | Ref Range         | Performed At    |
+------------+--------------------------+-------------------+-----------------+
| TROPONIN I | 0.075Comment:   0.00 to  | 0.00 - 0.10 ng/mL | [facility] |
|            | 0.10     CONSISTENT WITH |                   |                 |
|            |  NORMAL POPULATION0.11   |                   |                 |
|            | to 0.60     CONSISTENT   |                   |                 |
|            | WITH INCREASED RISK FOR  |                   |                 |
|            | ADVERSE OUTCOMES>        |                   |                 |
|            | 0.60                     |                   |                 |
|            | CONSISTENT WITH WHO      |                   |                 |
|            | CRITERIA FOR ACUTE MI    |                   |                 |
|            | Testing performed at     |                   |                 |
|            | [facility];[address]            |                   |                 |
|            | [address];[place] [address]   |                   |                 |
+------------+--------------------------+-------------------+-----------------+
 
 
 
+----------+
| Specimen |
+----------+
| Blood    |
+----------+
 
 
 
 
+-------------------+------------------+--------------------+--------------+
| Performing        | Address          | City/State/Zipcode | Phone Number |
| Organization      |                  |                    |              |
+-------------------+------------------+--------------------+--------------+
|   [facility] |   [address] | [place] [address] |              |
+-------------------+------------------+--------------------+--------------+
 ISTAT Troponin (2019 10:26 AM)
 
+----------------+-------+-----------+--------------+
| Component      | Value | Ref Range | Performed At |
+----------------+-------+-----------+--------------+
| POC Troponin I | 0.04  |           |              |
+----------------+-------+-----------+--------------+
 POC cardiac troponin (2019  9:54 AM)
 
+----------------------+--------------------------+-------------------+-----------------+
| Component            | Value                    | Ref Range         | Performed At    |
+----------------------+--------------------------+-------------------+-----------------+
| POC CARDIAC TROPONIN | 0.04Comment:   0.00 to   | 0.00 - 0.10 ng/mL | [facility] |
|                      | 0.10    Consistent with  |                   |                 |
|                      | normal population0.11 to |                   |                 |
|                      |  0.40    Consistent with |                   |                 |
|                      |  increased risk for      |                   |                 |
|                      | adverse                  |                   |                 |
|                      | outcomes>0.40            |                   |                 |
|                      |       Consistent with    |                   |                 |
|                      | WHO criteria for Acute   |                   |                 |
|                      | MI Testing performed at  |                   |                 |
|                      | [facility];[address] [place] [address]            |                   |                 |
|                      | [facility];[place] [address]   |                   |                 |
+----------------------+--------------------------+-------------------+-----------------+
 
 
 
+-------------------+------------------+--------------------+--------------+
| Performing        | Address          | City/State/Zipcode | Phone Number |
| Organization      |                  |                    |              |
+-------------------+------------------+--------------------+--------------+
|   [facility] |   [address] [place] [address] | [place] [address] |              |
+-------------------+------------------+--------------------+--------------+
 ED INFORMATION EXCHANGE (2019  9:47 AM)
 
+------------------------------------------------------------------------+--------------+
| Narrative                                                              | Performed At |
+------------------------------------------------------------------------+--------------+
|   [id number]     This patient has registered at the     |   ED         |
| [facility] Emergency [facility]   For more         | INFORMATION  |
| information visit:                                                     | EXCHANGE     |
| [email or web address] |              |
| [id number]   Security Events  No recent Security Events currently on file  |              |
|     ED Care Guidelines from [facility]  Last Updated: 18 |              |
|  10:16 AM         Other Information:  Currently being seen by [facility] |              |
|  in [place] for mental health concerns.[phone]  These are       |              |
| guidelines and the provider should exercise clinical judgment when     |              |
| providing care.  Additional plans are also available online from the   |              |
| following facilities: [facility]   Recent Emergency          |              |
| Department Visit Summary  Admit Date Facility City State Type Major    |              |
| Type Diagnoses or Chief Complaint   2019 [facility]  |              |
 
| [place] Emergency    Emergency          Chest Pain        Nausea     |              |
|      Shortness of Breath      2019 [facility] [place] |              |
|  [place] Emergency    Emergency          CHEST PAIN        Abnormal levels  |              |
| of other serum enzymes        Personal history of other diseases of    |              |
| the circulatory system        Chest pain, unspecified      2019 |              |
|  [facility] [place] Emergency    Emergency                 |              |
|     FISTULA BLEEDING        Hemorrhage due to vascular prosthetic      |              |
| devices, implants and grafts, initial encounter      Dec 22, 2018 [facility] |              |
|  [place] Emergency    Emergency          CHEST PAIN  |              |
|        Type 2 diabetes mellitus with hyperglycemia        Chest pain,  |              |
| unspecified      2018 [facility] [place]            |              |
| Emergency    Emergency    Chief Complaint: SOB      2018 [facility]  |              |
| [place] Emergency    Emergency          FALL         |              |
| Unspecified fall, initial encounter        Dependence on renal         |              |
| dialysis        End stage renal disease      2018 [facility]         |              |
| [facility] Emergency    Emergency          FALL         |              |
| Essential (primary) hypertension        Type 2 diabetes mellitus with  |              |
| hyperglycemia        Type 2 diabetes mellitus with unspecified         |              |
| complications        Unspecified fall, initial encounter               |              |
| Headache      2018 [facility] [place]               |              |
| Emergency    Emergency          -1. Dorsalgia, unspecified        -1.  |              |
| Painful micturition, unspecified        0. Frequency of micturition    |              |
|      1. Urinary tract infection, site not specified        6. Type 2   |              |
| diabetes mellitus with hyperglycemia        7. Type 2 diabetes         |              |
| mellitus with diabetic chronic kidney disease        8. End stage      |              |
| renal disease        9. Dependence on renal dialysis        10.        |              |
| Shortness of breath        11. Long term (current) use of              |              |
| anticoagulants            E.D. Visit Count (12 mo.)  Facility Visits   |              |
| Low Acuity   [facility] 19 0   [facility] 2 0 |              |
|    [facility] 2 0   Total 23 0   Note: Visits      |              |
| indicate total known visits. Medicaid Low Acuity Dx are the number of  |              |
| primary diagnoses on the Medicaid's Low Acuity dx list.         Recent |              |
|  Inpatient Visit Summary  Admit Date Facility City State Type Major    |              |
| Type Diagnoses or Chief Complaint   [facility] [place] [place] [facility]    Inpatient          Peripheral arterial disease,  |              |
| osteomyelitis left foot        Other acute osteomyelitis, left ankle   |              |
| and foot        Long term (current) use of antibiotics        End      |              |
| stage renal disease        Anemia in chronic kidney disease      Dec   |              |
| [facility] [place] [place] [facility]                         |              |
| Medicine    Inpatient          Peripheral vascular disease,            |              |
| unspecified        End stage renal disease        Dependence on renal  |              |
| dialysis        Long term (current) use of insulin        Other        |              |
| chronic osteomyelitis, left ankle and foot        Type 2 diabetes      |              |
| mellitus with diabetic chronic kidney disease        Essential         |              |
| (primary) hypertension      2018 [facility] [place]   |              |
| [place] Inpatient    Inpatient          Upper GIB        Other chronic      |              |
| osteomyelitis, unspecified ankle and foot        End stage renal       |              |
| disease        Other specified personal risk factors, not elsewhere    |              |
| classified        Chronic systolic (congestive) heart failure          |              |
| Anemia in chronic kidney disease        Other disorders of             |              |
| plasma-protein metabolism, not elsewhere classified        Moderate    |              |
| protein-calorie malnutrition        Other disorders of phosphorus      |              |
| metabolism      2018 [facility]. [place] Medical     |              |
| Surgical    Inpatient          1. Type 2 diabetes mellitus with other  |              |
| specified complication        2. Urinary tract infection, site not     |              |
| specified        3. Unspecified protein-calorie malnutrition        4. |              |
|  Other chronic osteomyelitis, unspecified site        5. Hypertensive  |              |
| heart and chronic kidney disease with heart failure and with stage 5   |              |
| chronic kidney disease, or end stage renal disease        6. Chronic   |              |
| diastolic (congestive) heart failure        7. Other osteomyelitis,    |              |
 
| ankle and foot        8. Type 2 diabetes mellitus with foot ulcer      |              |
|      9. Atherosclerotic heart disease of native coronary artery        |              |
| without angina pectoris        10. Chronic obstructive pulmonary       |              |
| disease, unspecified      2018 [place]    |              |
| Medical Surgical    Inpatient          1. Benign paroxysmal vertigo,   |              |
| unspecified ear        2. End stage renal disease        3.            |              |
| Hypertensive chronic kidney disease with stage 5 chronic kidney        |              |
| disease or end stage renal disease        4. Urinary tract infection,  |              |
| site not specified        5. Hypertensive heart and chronic kidney     |              |
| disease with heart failure and with stage 5 chronic kidney disease, or |              |
|  end stage renal disease        6. Kidney transplant status        7.  |              |
| Unspecified systolic (congestive) heart failure        8. Syncope and  |              |
| collapse        9. Type 2 diabetes mellitus with diabetic chronic      |              |
| kidney disease        10. Atherosclerotic heart disease of native      |              |
| coronary artery without angina pectoris            Prescription Drug   |              |
| Report (12 Mo.)  PDMP query found no report.     Care Providers        |              |
| Provider PRC Type Phone Fax Service Dates   HEADINGS, SANTIAGO, F.N.P.     |              |
| Nurse Practitioner: Family     Current      [facility]     |              |
| Care [phone]    Oct 1, 2016 - Current      [facility]       |              |
| [place] [facility]    [phone] Current      [facility]       |              |
| [facility]     Current      [facility]   |              |
| Mental Health Provider [phone] Current           |              |
| or_praxis Case or Care Manager     Current      Willard Crook -       |              |
| MIRTA Ortiz Case or Care Manager [phone]     |              |
| Current      Jairo Gutierrez MD Other     Current         Known      |              |
| Aliases  No known aliases.   Criteria met         Care Guidelines      |              |
|     10 in 12      3 in 60      2 in 2     The above information is     |              |
| provided for the sole purpose of patient treatment. Use of this        |              |
| information beyond the terms of Data Sharing Memorandum of             |              |
| Understanding and License Agreement is prohibited. In certain cases    |              |
| not all visits may be represented.   Consult the aforementioned        |              |
| facilities for additional information.   2019 [employer]       |              |
| [employer] - [place], [place] -                              |              |
| [email or web address]                                         |              |
+------------------------------------------------------------------------+--------------+
 
 
 
+-------------------------------------------------------------------------------------------
--------------------------------------------------------------------------------------------
--------------------+
| Procedure Note                                                                            
                                                                                            
                    |
+-------------------------------------------------------------------------------------------
--------------------------------------------------------------------------------------------
--------------------+
|   Interface, Lab - 2019  9:48 AM PST  Formatting of this note may be different      
                                                                                            
                    |
| from the original.[id number] [id number] patient has registered at the [facility]      
                                                                                            
                    |
| [facility] Emergency Department For more information visit:                  
                                                                                            
                    |
| [email or web address] Security     
                                                                                            
                    |
| EventsNo recent Security Events currently on fileED Care Guidelines from [employer] [facility]       
 
                                                                                            
                    |
| [place] Updated: 18 10:16 AM  Other Information:Currently being seen by         
                                                                                            
                    |
| [facility] in [place] for mental health concerns.[phone] are guidelines and     
                                                                                            
                    |
| the provider should exercise clinical judgment when providing care.Additional plans are   
                                                                                            
                    |
| also available online from the following facilities: [employer] Recent          
                                                                                            
                    |
| Emergency Department Visit SummaryAdmit Date Facility City State Type Major Type          
                                                                                            
                    |
| Diagnoses or Chief Complaint [facility] [place] Emergency        
                                                                                            
                    |
| Emergency    Chest Pain    Nausea    Shortness of Breath  2019 [facility]      
                                                                                            
                    |
| [facility] [place] Emergency  Emergency    CHEST PAIN    Abnormal levels of other serum     
                                                                                            
                    |
| enzymes    Personal history of other diseases of the circulatory system    Chest pain,    
                                                                                            
                    |
| unspecified  2019 [facility] [place] Emergency  Emergency    FISTULA   
                                                                                            
                    |
| BLEEDING    Hemorrhage due to vascular prosthetic devices, implants and grafts, initial   
                                                                                            
                    |
| encounter  Dec 22, 2018 [facility] [place] Emergency  Emergency    CHEST      
                                                                                            
                    |
| PAIN    Type 2 diabetes mellitus with hyperglycemia    Chest pain, unspecified  ,   
                                                                                            
                    |
|  [place] Emergency  Emergency  Chief Complaint: SOB  ,    
                                                                                            
                    |
| 2018 [facility] [place] Emergency  Emergency    FALL    Unspecified fall,     
                                                                                            
                    |
| initial encounter    Dependence on renal dialysis    End stage renal disease  ,     
                                                                                            
                    |
|  [facility] [place] Emergency  Emergency    FALL    Essential (primary)   
                                                                                            
                    |
| hypertension    Type 2 diabetes mellitus with hyperglycemia    Type 2 diabetes mellitus   
                                                                                            
                    |
| with unspecified complications    Unspecified fall, initial encounter    Headache  Nov    
                                                                                            
                    |
| 2018 [place] Emergency  Emergency    -1. Dorsalgia,             
 
                                                                                            
                    |
| unspecified    -1. Painful micturition, unspecified    0. Frequency of micturition    1.  
                                                                                            
                    |
|  Urinary tract infection, site not specified    6. Type 2 diabetes mellitus with          
                                                                                            
                    |
| hyperglycemia    7. Type 2 diabetes mellitus with diabetic chronic kidney disease    8.   
                                                                                            
                    |
| End stage renal disease    9. Dependence on renal dialysis    10. Shortness of breath     
                                                                                            
                    |
|  11. Long term (current) use of anticoagulants  E.D. Visit Count (12 mo.)Facility Visits  
                                                                                            
                    |
|  Low Acuity [facility] 19 0 [facility] 2 0 [facility]       
                                                                                            
                    |
| [facility] 2 0 Total 23 0 Note: Visits indicate total known visits. Medicaid Low      
                                                                                            
                    |
| Acuity Dx are the number of primary diagnoses on the Medicaid's Low Acuity dx list.       
                                                                                            
                    |
| Recent Inpatient Visit SummaryAdmit Date Facility City State Type Major Type Diagnoses    
                                                                                            
                    |
| or Chief Complaint Dec 27, 2018 [facility] [place] Recovery  Inpatient        
                                                                                            
                    |
| Peripheral arterial disease, osteomyelitis left foot    Other acute osteomyelitis, left   
                                                                                            
                    |
| ankle and foot    Long term (current) use of antibiotics    End stage renal disease       
                                                                                            
                    |
| Anemia in chronic kidney disease  Dec 5, 2018 [facility]      
                                                                                            
                    |
| Medicine  Inpatient    Peripheral vascular disease, unspecified    End stage renal        
                                                                                            
                    |
| disease    Dependence on renal dialysis    Long term (current) use of insulin    Other    
                                                                                            
                    |
| chronic osteomyelitis, left ankle and foot    Type 2 diabetes mellitus with diabetic      
                                                                                            
                    |
| chronic kidney disease    Essential (primary) hypertension  2018 [facility]  
                                                                                            
                    |
|  [place] Inpatient  Inpatient    Upper GIB    Other chronic osteomyelitis,         
                                                                                            
                    |
| unspecified ankle and foot    End stage renal disease    Other specified personal risk    
                                                                                            
                    |
| factors, not elsewhere classified    Chronic systolic (congestive) heart failure          
 
                                                                                            
                    |
| Anemia in chronic kidney disease    Other disorders of plasma-protein metabolism, not     
                                                                                            
                    |
| elsewhere classified    Moderate protein-calorie malnutrition    Other disorders of       
                                                                                            
                    |
| phosphorus metabolism  [facility] [place] [place] Medical Surgical        
                                                                                            
                    |
| Inpatient    1. Type 2 diabetes mellitus with other specified complication    2. Urinary  
                                                                                            
                    |
|  tract infection, site not specified    3. Unspecified protein-calorie malnutrition       
                                                                                            
                    |
| 4. Other chronic osteomyelitis, unspecified site    5. Hypertensive heart and chronic     
                                                                                            
                    |
| kidney disease with heart failure and with stage 5 chronic kidney disease, or end stage   
                                                                                            
                    |
| renal disease    6. Chronic diastolic (congestive) heart failure    7. Other              
                                                                                            
                    |
| osteomyelitis, ankle and foot    8. Type 2 diabetes mellitus with foot ulcer    9.        
                                                                                            
                    |
| Atherosclerotic heart disease of native coronary artery without angina pectoris    10.    
                                                                                            
                    |
| Chronic obstructive pulmonary disease, unspecified  2018 Suz[facility]       
                                                                                            
                    |
| [place] [facility]  Inpatient    1. Benign paroxysmal vertigo, unspecified ear    
                                                                                            
                    |
|   2. End stage renal disease    3. Hypertensive chronic kidney disease with stage 5       
                                                                                            
                    |
| chronic kidney disease or end stage renal disease    4. Urinary tract infection, site     
                                                                                            
                    |
| not specified    5. Hypertensive heart and chronic kidney disease with heart failure and  
                                                                                            
                    |
|  with stage 5 chronic kidney disease, or end stage renal disease    6. Kidney transplant  
                                                                                            
                    |
|  status    7. Unspecified systolic (congestive) heart failure    8. Syncope and collapse  
                                                                                            
                    |
|     9. Type 2 diabetes mellitus with diabetic chronic kidney disease    10.               
                                                                                            
                    |
| Atherosclerotic heart disease of native coronary artery without angina pectoris           
                                                                                            
                    |
| Prescription Drug Report (12 Mo.)PDMP query found no report.[facility]    
 
                                                                                            
                    |
| Type Phone Fax Service Dates HEADINGS, PILO HENDERSON.N.P. Nurse Practitioner: Family   Current  
                                                                                            
                    |
|   [facility] [phone]  Oct 1, 2016 - Current  [facility]       
                                                                                            
                    |
| [place] [facility]  [phone] Current  [facility]       
                                                                                            
                    |
| Primary Care   Current  [facility] Provider [phone]         
                                                                                            
                    |
| [phone] Current  [facility] Case or Care Manager   Current  Willard Ortiz,  
                                                                                            
                    |
|  [facility] Case or Care Manager [phone] Current  Jairo Gutierrez MD     
                                                                                            
                    |
| Other   Current  Known AliasesNo known aliases. Criteria met  Care Guidelines  10 in 12   
                                                                                            
                    |
|  3 in 60  2 in 2The above information is provided for the sole purpose of patient         
                                                                                            
                    |
| treatment. Use of this information beyond the terms of Data Sharing Memorandum of         
                                                                                            
                    |
| Understanding and License Agreement is prohibited. In certain cases not all visits may    
                                                                                            
                    |
| be represented. Consult the aforementioned facilities for additional information. 2019    
                                                                                            
                    |
| [employer] - [place], [place] -                              
                                                                                            
                    |
| [email or web address]                                                            
                                                                                            
                    |
|   Other acute osteomyelitis, left ankle and foot                                          
                                                                                            
                    |
|   Long term (current) use of antibiotics                                                  
                                                                                            
                    |
|   End stage renal disease                                                                 
                                                                                            
                    |
|   Anemia in chronic kidney disease                                                        
                                                                                            
                    |
|                                                                                           
                                                                                            
                    |
|[facility] [place] [facility]                     
                                                                                            
                    |
|  Peripheral vascular disease, unspecified                                                 
 
                                                                                            
                    |
|   End stage renal disease                                                                 
                                                                                            
                    |
|   Dependence on renal dialysis                                                            
                                                                                            
                    |
|   Long term (current) use of insulin                                                      
                                                                                            
                    |
|   Other chronic osteomyelitis, left ankle and foot                                        
                                                                                            
                    |
|   Type 2 diabetes mellitus with diabetic chronic kidney disease                           
                                                                                            
                    |
|   Essential (primary) hypertension                                                        
                                                                                            
                    |
|                                                                                           
                                                                                            
                    |
|[facility] [place] [facility]  Inpatient                           
                                                                                            
                    |
|  Upper GIB                                                                                
                                                                                            
                    |
|   Other chronic osteomyelitis, unspecified ankle and foot                                 
                                                                                            
                    |
|   End stage renal disease                                                                 
                                                                                            
                    |
|   Other specified personal risk factors, not elsewhere classified                         
                                                                                            
                    |
|   Chronic systolic (congestive) heart failure                                             
                                                                                            
                    |
|   Anemia in chronic kidney disease                                                        
                                                                                            
                    |
|   Other disorders of plasma-protein metabolism, not elsewhere classified                  
                                                                                            
                    |
|   Moderate protein-calorie malnutrition                                                   
                                                                                            
                    |
|   Other disorders of phosphorus metabolism                                                
                                                                                            
                    |
|                                                                                           
                                                                                            
                    |
|2018 [facility]. [place] [facility]  Inpatient                     
                                                                                            
                    |
|  1. Type 2 diabetes mellitus with other specified complication                            
 
                                                                                            
                    |
|   2. Urinary tract infection, site not specified                                          
                                                                                            
                    |
|   3. Unspecified protein-calorie malnutrition                                             
                                                                                            
                    |
|   4. Other chronic osteomyelitis, unspecified site                                        
                                                                                            
                    |
|   5. 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
onic kidney disease, or end stage renal disease                                             
                    |
|   6. Chronic diastolic (congestive) heart failure                                         
                                                                                            
                    |
|   7. Other osteomyelitis, ankle and foot                                                  
                                                                                            
                    |
|   8. Type 2 diabetes mellitus with foot ulcer                                             
                                                                                            
                    |
|   9. Atherosclerotic heart disease of native coronary artery without angina pectoris      
                                                                                            
                    |
|   10. Chronic obstructive pulmonary disease, unspecified                                  
                                                                                            
                    |
|                                                                                           
                                                                                            
                    |
|2018 [facility]. [place] [facility]  Inpatient                      
                                                                                            
                    |
|  1. Benign paroxysmal vertigo, unspecified ear                                            
                                                                                            
                    |
|   2. End stage renal disease                                                              
                                                                                            
                    |
|   3. Hypertensive chronic kidney disease with stage 5 chronic kidney disease or end stage 
renal disease                                                                               
                    |
|   4. Urinary tract infection, site not specified                                          
                                                                                            
                    |
|   5. 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
onic kidney disease, or end stage renal disease                                             
                    |
|   6. Kidney transplant status                                                             
                                                                                            
                    |
|   7. Unspecified systolic (congestive) heart failure                                      
                                                                                            
                    |
|   8. Syncope and collapse                                                                 
                                                                                            
                    |
|   9. Type 2 diabetes mellitus with diabetic chronic kidney disease                        
 
                                                                                            
                    |
|   10. Atherosclerotic heart disease of native coronary artery without angina pectoris     
                                                                                            
                    |
|                                                                                           
                                                                                            
                    |
|                                                                                           
                                                                                            
                    |
|                                                                                           
                                                                                            
                    |
|Prescription Drug Report (12 Mo.)                                                          
                                                                                            
                    |
|PDMP query found no report.                                                                
                                                                                            
                    |
|                                                                                           
                                                                                            
                    |
|Care Providers                                                                             
                                                                                            
                    |
|Provider PRC Type Phone Fax Service Dates                                                  
                                                                                            
                    |
|HEADINGS, ELZA HENDERSON Nurse Practitioner: Family   Current                                
                                                                                            
                    |
|[facility] [phone]  Oct 1, 2016 - Current                          
                                                                                            
                    |
|[facility]  [phone] Current                               
                                                                                            
                    |
|[facility]   Current                                
                                                                                            
                    |
|[facility] Provider [phone] Current                 
                                                                                            
                    |
|[facility] Case or Care Manager   Current                                                   
                                                                                            
                    |
|[facility] Case or Care Manager [phone] Current
                                                                                            
                    |
|Jairo Gutierrez MD Other   Current                                                       
                                                                                            
                    |
|                                                                                           
                                                                                            
                    |
|Known Aliases                                                                              
                                                                                            
                    |
|No known aliases.                                                                          
 
                                                                                            
                    |
|Criteria met                                                                               
                                                                                            
                    |
|                                                                                           
                                                                                            
                    |
|  Care Guidelines                                                                          
                                                                                            
                    |
|  10 in 12                                                                                 
                                                                                            
                    |
|  3 in 60                                                                                  
                                                                                            
                    |
|  2 in 2                                                                                   
                                                                                            
                    |
|                                                                                           
                                                                                            
                    |
|The above information is provided for the sole purpose of patient treatment. Use of this in
formation beyond the terms of Data Sharing Memorandum of Understanding and License Agreement
 is prohibited. In  |
|certain cases not all visits may be represented. Consult the aforementioned facilities for 
additional information.                                                                     
                    |
|2019 [employer] - [place], [place] - [email or web address]                                                                                       
                    |
+-------------------------------------------------------------------------------------------
--------------------------------------------------------------------------------------------
--------------------+
 
 
 
+-------------------+---------+--------------------+--------------+
| Performing        | Address | City/State/Zipcode | Phone Number |
| Organization      |         |                    |              |
+-------------------+---------+--------------------+--------------+
|   ED INFORMATION  |         |                    |              |
| EXCHANGE          |         |                    |              |
+-------------------+---------+--------------------+--------------+
 EKG 12 LEAD UNIT PERFORMED (2019  9:44 AM)
 
+-------------------+--------------------------+-----------+--------------+
| Component         | Value                    | Ref Range | Performed At |
+-------------------+--------------------------+-----------+--------------+
| Ventricular Rate  | 93                       | BPM       | [facility] EKG     |
+-------------------+--------------------------+-----------+--------------+
| Atrial Rate       | 93                       | BPM       | [facility] EKG     |
+-------------------+--------------------------+-----------+--------------+
| P-R Interval      | 136                      | ms        | [facility] EKG     |
+-------------------+--------------------------+-----------+--------------+
| QRS Duration      | 126                      | ms        | [facility] EKG     |
+-------------------+--------------------------+-----------+--------------+
| Q-T Interval      | 416                      | ms        | [facility] EKG     |
+-------------------+--------------------------+-----------+--------------+
 
| QTC Calculation   | 517                      | ms        | [facility] EKG     |
| (Bezet)           |                          |           |              |
+-------------------+--------------------------+-----------+--------------+
| Calculated P Axis | 84                       | degrees   | [facility] EKG     |
+-------------------+--------------------------+-----------+--------------+
| Calculated R Axis | -30                      | degrees   | [facility]     |
+-------------------+--------------------------+-----------+--------------+
| Calculated T Axis | 37                       | degrees   | [facility]     |
+-------------------+--------------------------+-----------+--------------+
| Diagnosis         | Normal sinus rhythmLeft  |           | [facility] EKG     |
|                   | axis                     |           |              |
|                   | deviationNon-specific    |           |              |
|                   | intra-ventricular        |           |              |
|                   | conduction blockCannot   |           |              |
|                   | rule out Septal infarct  |           |              |
|                   | , age                    |           |              |
|                   | undeterminedAbnormal     |           |              |
|                   | ECGWhen compared with    |           |              |
|                   | ECG of 2019       |           |              |
|                   | 16:30,QRS duration has   |           |              |
|                   | decreasedMinimal         |           |              |
|                   | criteria for Septal      |           |              |
|                   | infarct are now PresentT |           |              |
|                   |  wave inversion now      |           |              |
|                   | evident in Lateral       |           |              |
|                   | leadsThis ECG contains   |           |              |
|                   | Unconfirmed              |           |              |
|                   | Interpretation           |           |              |
|                   | Statements.    See ED    |           |              |
|                   | Record for Physician     |           |              |
|                   | Interpretation.          |           |              |
|                   | Confirmed by MUSE READ   |           |              |
|                   | ONLY, -COMPUTER ([id number]),   |           |              |
|                   |  Sherman Grissom   |           |              |
|                   | Samm ([id number]) on 2019  |           |              |
|                   | 3:51:40 AM               |           |              |
+-------------------+--------------------------+-----------+--------------+
 
 
 
+--------------+-------------------+--------------------+--------------+
| Performing   | Address           | City/State/Zipcode | Phone Number |
| Organization |                   |                    |              |
+--------------+-------------------+--------------------+--------------+
|   [facility]   |   [address] | [place] [address] |              |
+--------------+-------------------+--------------------+--------------+
 in this encounter
 
 Visit Diagnoses
 
 
+---------------------------------------------------------------------+
| Diagnosis                                                           |
+---------------------------------------------------------------------+
|   Hx of CABG - Primary                                              |
+---------------------------------------------------------------------+
|   Postsurgical aortocoronary bypass status                          |
+---------------------------------------------------------------------+
|   Chest pain, unspecified type                                      |
+---------------------------------------------------------------------+
 
|   S/P MVR (mitral valve repair)                                     |
+---------------------------------------------------------------------+
|   Other postprocedural status                                       |
+---------------------------------------------------------------------+
|   Elevated blood pressure reading                                   |
+---------------------------------------------------------------------+
|   Elevated blood pressure reading without diagnosis of hypertension |
+---------------------------------------------------------------------+
|   Chronic systolic congestive heart failure (HCC)                   |
+---------------------------------------------------------------------+
|   Chronic systolic heart failure                                    |
+---------------------------------------------------------------------+
 
 
 
 Admitting Diagnoses
 
 
+---------------------------------------------------------------------+
| Diagnosis                                                           |
+---------------------------------------------------------------------+
|   Elevated blood pressure reading                                   |
+---------------------------------------------------------------------+
|   Elevated blood pressure reading without diagnosis of hypertension |
+---------------------------------------------------------------------+
|   Chronic systolic congestive heart failure (HCC)                   |
+---------------------------------------------------------------------+
|   Chronic systolic heart failure                                    |
+---------------------------------------------------------------------+
|   S/P MVR (mitral valve repair)                                     |
+---------------------------------------------------------------------+
|   Other postprocedural status                                       |
+---------------------------------------------------------------------+
|   Hx of CABG                                                        |
+---------------------------------------------------------------------+
|   Postsurgical aortocoronary bypass status                          |
+---------------------------------------------------------------------+
|   Chest pain in adult                                               |
+---------------------------------------------------------------------+
|   Chest pain, unspecified type                                      |
+---------------------------------------------------------------------+
 
 
 
 Administered Medications
 
 
+------------------+--------+---------+------+------+------+
| Medication Order | MAR    | Action  | Dose | Rate | Site |
|                  | Action | Date    |      |      |      |
+------------------+--------+---------+------+------+------+
 
 
 
+-----------------------------------+---+
|   acetaminophen (TYLENOL)         |   |
| suppository 650 mg  650 mg,       |   |
| Rectal, Every 6 Hours PRN, Mild   |   |
| Pain (1-3), Fever, Starting Wed   |   |
| 19 at 1351                   |   |
 
+-----------------------------------+---+
|                                   |   |
+-----------------------------------+---+
|   acetaminophen (TYLENOL) tablet  |   |
| 650 mg  650 mg, Oral, Every 6     |   |
| Hours PRN, Mild Pain (1-3),       |   |
| Fever, Starting 19 at    |   |
| 1351                              |   |
+-----------------------------------+---+
|                                   |   |
+-----------------------------------+---+
 
 
 
+-----------------------------------+-------+----------+--------+---+---+
|   apixaban (ELIQUIS) tablet 2.5   | Given |  | 2.5 mg |   |   |
| mg  2.5 mg, Oral, 2 Times Daily,  |       | 9 11:48  |        |   |   |
| First dose on 19 at 1430 |       | PST      |        |   |   |
+-----------------------------------+-------+----------+--------+---+---+
 
 
 
+-------+----------+--------+---+---+
| Given |  | 2.5 mg |   |   |
|       | 9 21:53  |        |   |   |
|       | PST      |        |   |   |
+-------+----------+--------+---+---+
| Given |  | 2.5 mg |   |   |
|       | 9 08:15  |        |   |   |
|       | PST      |        |   |   |
+-------+----------+--------+---+---+
 
 
 
+---+---+
|   |   |
+---+---+
 
 
 
+-----------------------------------+-------+----------+-------+---+---+
|   atorvastatin (LIPITOR) tablet   | Given |  | 40 mg |   |   |
| 40 mg  40 mg, Oral, Nightly,      |       | 9 22:44  |       |   |   |
| First dose on 19 at 2200 |       | PST      |       |   |   |
+-----------------------------------+-------+----------+-------+---+---+
 
 
 
+-------+----------+-------+---+---+
| Given |  | 40 mg |   |   |
|       | 9 21:53  |       |   |   |
|       | PST      |       |   |   |
+-------+----------+-------+---+---+
 
 
 
+---+---+
|   |   |
+---+---+
 
 
 
 
+----------------------------------+-------+----------+--------+---+---+
|   calcium acetate (PHOSLO)       | Given |  | 667 mg |   |   |
| capsule 667 mg  667 mg, Oral, 3  |       | 9 16:18  |        |   |   |
| Times Daily With Meals, First    |       | PST      |        |   |   |
| dose on 19 at 1700      |       |          |        |   |   |
+----------------------------------+-------+----------+--------+---+---+
 
 
 
+-------+----------+--------+---+---+
| Given |  | 667 mg |   |   |
|       | 9 08:15  |        |   |   |
|       | PST      |        |   |   |
+-------+----------+--------+---+---+
| Given |  | 667 mg |   |   |
|       | 9 12:25  |        |   |   |
|       | PST      |        |   |   |
+-------+----------+--------+---+---+
 
 
 
+---+---+
|   |   |
+---+---+
 
 
 
+----------------------------------+-------+----------+----------+---+---+
|   calcium carbonate (TUMS)       | Given |  | 1,000 mg |   |   |
| chewable tablet 1,000 mg  1,000  |       | 9 11:47  |          |   |   |
| mg, Oral, Every 48 Hours, First  |       | PST      |          |   |   |
| dose on u 19 at 0830      |       |          |          |   |   |
+----------------------------------+-------+----------+----------+---+---+
 
 
 
+---+---+
|   |   |
+---+---+
 
 
 
+-----------------------------------+-------+----------+---------+---+---+
|   carvedilol (COREG) tablet 12.5  | Given |  | 12.5 mg |   |   |
| mg  12.5 mg, Oral, 2 Times Daily  |       | 9 16:18  |         |   |   |
| With Meals, First dose on Thu     |       | PST      |         |   |   |
| 19 at 1700                   |       |          |         |   |   |
+-----------------------------------+-------+----------+---------+---+---+
 
 
 
+-------+----------+---------+---+---+
| Given |  | 12.5 mg |   |   |
|       | 9 08:14  |         |   |   |
|       | PST      |         |   |   |
+-------+----------+---------+---+---+
 
 
 
 
+---+---+
|   |   |
+---+---+
 
 
 
+-----------------------------------+-------+----------+----------+---+---+
|   carvedilol (COREG) tablet 3.125 | Given |  | 3.125 mg |   |   |
|  mg  3.125 mg, Oral, 2 Times      |       | 9 16:59  |          |   |   |
| Daily With Meals, First dose on   |       | PST      |          |   |   |
| Wed 19 at 1700               |       |          |          |   |   |
+-----------------------------------+-------+----------+----------+---+---+
 
 
 
+---+---+
|   |   |
+---+---+
 
 
 
+-----------------------------------+-------+----------+-----+-------+---+
|   cefTAZidime (FORTAZ) 2 g in     | Given |  | 2 g | 100   |   |
| sodium chloride (IV) 0.9 % 50 mL  |       | 9 13:47  |     | mL/hr |   |
| IVPB  2 g, Intravenous,           |       | PST      |     |       |   |
| Administer over 30 Minutes, After |       |          |     |       |   |
|  Hemodialysis (see admin          |       |          |     |       |   |
| instructions), Starting Thu       |       |          |     |       |   |
| 19 at 0000, Please send MAR  |       |          |     |       |   |
| message to request dose on        |       |          |     |       |   |
| dialysis days                     |       |          |     |       |   |
+-----------------------------------+-------+----------+-----+-------+---+
 
 
 
+---+---+
|   |   |
+---+---+
 
 
 
+-----------------------------------+-------+----------+--------+---+---+
|   cholecalciferol (VITAMIN D-3)   | Given |  | 1,000  |   |   |
| tablet 1,000 Units  1,000 Units,  |       | 9 15:25  | Units  |   |   |
| Oral, Daily, First dose on Wed    |       | PST      |        |   |   |
| 19 at 1430                   |       |          |        |   |   |
+-----------------------------------+-------+----------+--------+---+---+
 
 
 
+-------+----------+--------+---+---+
| Given |  | 1,000  |   |   |
|       | 9 11:48  | Units  |   |   |
|       | PST      |        |   |   |
+-------+----------+--------+---+---+
| Given |  | 1,000  |   |   |
|       | 9 08:16  | Units  |   |   |
|       | PST      |        |   |   |
+-------+----------+--------+---+---+
 
 
 
 
+---+---+
|   |   |
+---+---+
 
 
 
+-----------------------------------+-------+----------+-------+---+---+
|   clopidogrel (PLAVIX) tablet 75  | Given |  | 75 mg |   |   |
| mg  75 mg, Oral, Daily, First     |       | 9 15:24  |       |   |   |
| dose on Wed 19 at 1430       |       | PST      |       |   |   |
+-----------------------------------+-------+----------+-------+---+---+
 
 
 
+-------+----------+-------+---+---+
| Given |  | 75 mg |   |   |
|       | 9 11:47  |       |   |   |
|       | PST      |       |   |   |
+-------+----------+-------+---+---+
| Given |  | 75 mg |   |   |
|       | 9 08:16  |       |   |   |
|       | PST      |       |   |   |
+-------+----------+-------+---+---+
 
 
 
+---+---+
|   |   |
+---+---+
 
 
 
+-----------------------------------+-------+----------+-------+---+---+
|   diphenhydrAMINE (BENADRYL)      | Given |  | 25 mg |   |   |
| capsule 25 mg  25 mg, Oral, Every |       | 9 10:58  |       |   |   |
|  6 Hours PRN, Itching, Starting   |       | PST      |       |   |   |
| Fri 19 at 1020               |       |          |       |   |   |
+-----------------------------------+-------+----------+-------+---+---+
 
 
 
+---+---+
|   |   |
+---+---+
 
 
 
+-----------------------------------+-------+----------+-------+---+---+
|   furosemide (LASIX) tablet 20 mg | Given |  | 20 mg |   |   |
|   20 mg, Oral, Daily, First dose  |       | 9 15:25  |       |   |   |
| on Wed 19 at 1430            |       | PST      |       |   |   |
+-----------------------------------+-------+----------+-------+---+---+
 
 
 
+---+---+
|   |   |
 
+---+---+
 
 
 
+-----------------------------------+-------+----------+----------+---+---+
|   HYDROcodone-acetaminophen       | Given |  | 1 tablet |   |   |
| (NORCO) 5-325 MG per tablet 1     |       | 9 17:33  |          |   |   |
| tablet  1 tablet, Oral, Every 4   |       | PST      |          |   |   |
| Hours PRN, Moderate Pain (4-6),   |       |          |          |   |   |
| Severe Pain (7-10), Starting Wed  |       |          |          |   |   |
| 19 at 1350                   |       |          |          |   |   |
+-----------------------------------+-------+----------+----------+---+---+
 
 
 
+-------+----------+----------+---+---+
| Given |  | 1 tablet |   |   |
|       | 9 21:53  |          |   |   |
|       | PST      |          |   |   |
+-------+----------+----------+---+---+
| Given |  | 1 tablet |   |   |
|       | 9 05:42  |          |   |   |
|       | PST      |          |   |   |
+-------+----------+----------+---+---+
 
 
 
+---+---+
|   |   |
+---+---+
 
 
 
+-----------------------------------+-------+----------+---------+---+---+
|   HYDROcodone-acetaminophen       | Given |  | 2       |   |   |
| (NORCO) 5-325 MG per tablet 2     |       | 9 11:32  | tablets |   |   |
| tablet  2 tablet, Oral, Once, Wed |       | PST      |         |   |   |
|  19 at 1130, For 1 dose      |       |          |         |   |   |
+-----------------------------------+-------+----------+---------+---+---+
 
 
 
+---+---+
|   |   |
+---+---+
 
 
 
+-----------------------------------+-------+----------+----------+---+---+
|   insulin detemir (LEVEMIR)       | Given |  | 10 Units |   |   |
| injection 10 Units  10 Units,     |       | 9 22:43  |          |   |   |
| Subcutaneous, Nightly, First dose |       | PST      |          |   |   |
|  on 19 at 2200           |       |          |          |   |   |
+-----------------------------------+-------+----------+----------+---+---+
 
 
 
+-------+----------+----------+---+---+
| Given |  | 10 Units |   |   |
|       | 9 21:54  |          |   |   |
 
|       | PST      |          |   |   |
+-------+----------+----------+---+---+
 
 
 
+---+---+
|   |   |
+---+---+
 
 
 
+-----------------------------------+-------+----------+---------+---+---+
|   insulin lispro (human)          | Given |  | 1 Units |   |   |
| (HUMALOG) injection 0-3 Units     |       | 9 22:43  |         |   |   |
| 0-3 Units, Subcutaneous, Nightly, |       | PST      |         |   |   |
|  First dose on 19 at     |       |          |         |   |   |
| 2200                              |       |          |         |   |   |
+-----------------------------------+-------+----------+---------+---+---+
 
 
 
+---+---+
|   |   |
+---+---+
 
 
 
+-----------------------------------+-------+----------+---------+---+---+
|   insulin lispro (human)          | Given |  | 1 Units |   |   |
| (HUMALOG) injection 0-6 Units     |       | 9 05:38  |         |   |   |
| 0-6 Units, Subcutaneous, 3 Times  |       | PST      |         |   |   |
| Daily Before Meals, First dose on |       |          |         |   |   |
|  Wed 19 at 1630              |       |          |         |   |   |
+-----------------------------------+-------+----------+---------+---+---+
 
 
 
+-------+----------+---------+---+---+
| Given |  | 2 Units |   |   |
|       | 9 12:15  |         |   |   |
|       | PST      |         |   |   |
+-------+----------+---------+---+---+
 
 
 
+---+---+
|   |   |
+---+---+
 
 
 
+-----------------------------------+-------+----------+-------+---+---+
|   isosorbide mononitrate (IMDUR)  | Given |  | 60 mg |   |   |
| 24 hr tablet 60 mg  60 mg, Oral,  |       | 9 15:24  |       |   |   |
| Daily, First dose on 19  |       | PST      |       |   |   |
| at 1430                           |       |          |       |   |   |
+-----------------------------------+-------+----------+-------+---+---+
 
 
 
 
+-------+----------+-------+---+---+
| Given |  | 60 mg |   |   |
|       | 9 07:34  |       |   |   |
|       | PST      |       |   |   |
+-------+----------+-------+---+---+
| Given |  | 60 mg |   |   |
|       | 9 08:15  |       |   |   |
|       | PST      |       |   |   |
+-------+----------+-------+---+---+
 
 
 
+---+---+
|   |   |
+---+---+
 
 
 
+-----------------------------------+-------+----------+------+---+---+
|   LORazepam (ATIVAN) tablet 1 mg  | Given |  | 1 mg |   |   |
|  1 mg, Oral, Daily PRN, Anxiety,  |       | 9 22:51  |      |   |   |
| Starting 19 at 1350      |       | PST      |      |   |   |
+-----------------------------------+-------+----------+------+---+---+
 
 
 
+-------+----------+------+---+---+
| Given |  | 1 mg |   |   |
|       | 9 07:31  |      |   |   |
|       | PST      |      |   |   |
+-------+----------+------+---+---+
 
 
 
+---+---+
|   |   |
+---+---+
 
 
 
+-----------------------------------+-------+----------+------+---+---+
|   LORazepam (ATIVAN) tablet 1 mg  | Given |  | 1 mg |   |   |
|  1 mg, Oral, Once, Wed 19 at |       | 9 13:23  |      |   |   |
|  1323, For 1 dose                 |       | PST      |      |   |   |
+-----------------------------------+-------+----------+------+---+---+
 
 
 
+---+---+
|   |   |
+---+---+
 
 
 
+-----------------------------------+-------+----------+------+---+---+
|   LORazepam (ATIVAN) tablet 1 mg  | Given |  | 1 mg |   |   |
|  1 mg, Oral, Every 6 Hours PRN,   |       | 9 16:34  |      |   |   |
| Anxiety, Starting Thu 19 at  |       | PST      |      |   |   |
| 1200                              |       |          |      |   |   |
+-----------------------------------+-------+----------+------+---+---+
 
 
 
 
+-------+----------+------+---+---+
| Given |  | 1 mg |   |   |
|       | 9 13:15  |      |   |   |
|       | PST      |      |   |   |
+-------+----------+------+---+---+
 
 
 
+---+---+
|   |   |
+---+---+
 
 
 
+-----------------------------------+-------+----------+-------+---+---+
|   losartan (COZAAR) tablet 25 mg  | Given |  | 25 mg |   |   |
|  25 mg, Oral, Daily, First dose   |       | 9 15:24  |       |   |   |
| on Wed 19 at 1430            |       | PST      |       |   |   |
+-----------------------------------+-------+----------+-------+---+---+
 
 
 
+-------+----------+-------+---+---+
| Given |  | 25 mg |   |   |
|       | 9 11:48  |       |   |   |
|       | PST      |       |   |   |
+-------+----------+-------+---+---+
| Given |  | 25 mg |   |   |
|       | 9 08:15  |       |   |   |
|       | PST      |       |   |   |
+-------+----------+-------+---+---+
 
 
 
+---+---+
|   |   |
+---+---+
 
 
 
+-----------------------------------+-------+----------+-------+---+---+
|   magnesium chloride (MAG64) EC   | Given |  | 64 mg |   |   |
| tablet 64 mg  64 mg (1 tablet),   |       | 9 11:47  |       |   |   |
| Oral, Every 48 Hours, First dose  |       | PST      |       |   |   |
| on Thu 19 at 0830            |       |          |       |   |   |
+-----------------------------------+-------+----------+-------+---+---+
 
 
 
+---+---+
|   |   |
+---+---+
 
 
 
+-----------------------------------+-------+----------+--------+---+---+
|   nitroGLYCERIN (NITRO-BID) 2 %   | Given |  | 1 inch |   |   |
 
| ointment 1 inch  1 inch, Topical, |       | 9 16:59  |        |   |   |
|  Every 6 Hours, First dose on Wed |       | PST      |        |   |   |
|  19 at 1107, For 4 doses     |       |          |        |   |   |
+-----------------------------------+-------+----------+--------+---+---+
 
 
 
+-------+----------+--------+---+---+
| Given |  | 1 inch |   |   |
|       | 9 22:51  |        |   |   |
|       | PST      |        |   |   |
+-------+----------+--------+---+---+
| Given |  | 1 inch |   |   |
|       | 9 05:39  |        |   |   |
|       | PST      |        |   |   |
+-------+----------+--------+---+---+
 
 
 
+---+---+
|   |   |
+---+---+
 
 
 
+-----------------------------------+-------+----------+--------+---+---+
|   nitroGLYCERIN (NITROSTAT) SL    | Given |  | 0.4 mg |   |   |
| tablet 0.4 mg  0.4 mg,            |       | 9 09:59  |        |   |   |
| Sublingual, Every 5 Min PRN,      |       | PST      |        |   |   |
| Chest pain, Starting Wed 19  |       |          |        |   |   |
| at 0954, For 3 doses              |       |          |        |   |   |
+-----------------------------------+-------+----------+--------+---+---+
 
 
 
+-------+----------+--------+---+---+
| Given |  | 0.4 mg |   |   |
|       | 9 11:05  |        |   |   |
|       | PST      |        |   |   |
+-------+----------+--------+---+---+
| Given |  | 0.4 mg |   |   |
|       | 9 11:12  |        |   |   |
|       | PST      |        |   |   |
+-------+----------+--------+---+---+
 
 
 
+---+---+
|   |   |
+---+---+
 
 
 
+-----------------------------------+-------+----------+--------+---+---+
|   nitroGLYCERIN (NITROSTAT) SL    | Given |  | 0.4 mg |   |   |
| tablet 0.4 mg  0.4 mg,            |       | 9 13:27  |        |   |   |
| Sublingual, Every 5 Min PRN,      |       | PST      |        |   |   |
| Chest pain, Starting Wed 19  |       |          |        |   |   |
| at 1327, For 3 doses              |       |          |        |   |   |
+-----------------------------------+-------+----------+--------+---+---+
 
 
 
 
+-------+----------+--------+---+---+
| Given |  | 0.4 mg |   |   |
|       | 9 14:48  |        |   |   |
|       | PST      |        |   |   |
+-------+----------+--------+---+---+
| Given |  | 0.4 mg |   |   |
|       | 9 14:54  |        |   |   |
|       | PST      |        |   |   |
+-------+----------+--------+---+---+
 
 
 
+---+---+
|   |   |
+---+---+
 
 
 
+-----------------------------------+-------+----------+--------+---+---+
|   nitroGLYCERIN (NITROSTAT) SL    | Given |  | 0.4 mg |   |   |
| tablet 0.4 mg  0.4 mg,            |       | 9 13:55  |        |   |   |
| Sublingual, Every 5 Min PRN,      |       | PST      |        |   |   |
| Chest pain, Starting Thu 19  |       |          |        |   |   |
| at 1018                           |       |          |        |   |   |
+-----------------------------------+-------+----------+--------+---+---+
 
 
 
+-------+----------+--------+---+---+
| Given |  | 0.4 mg |   |   |
|       | 9 16:34  |        |   |   |
|       | PST      |        |   |   |
+-------+----------+--------+---+---+
| Given |  | 0.4 mg |   |   |
|       | 9 05:39  |        |   |   |
|       | PST      |        |   |   |
+-------+----------+--------+---+---+
 
 
 
+---+---+
|   |   |
+---+---+
 
 
 
+-----------------------------------+-------+----------+-------+---+---+
|   nortriptyline (PAMELOR) capsule | Given |  | 25 mg |   |   |
|  25 mg  25 mg, Oral, Every        |       | 9 08:14  |       |   |   |
| Morning, First dose on Fri        |       | PST      |       |   |   |
| 19 at 0900                   |       |          |       |   |   |
+-----------------------------------+-------+----------+-------+---+---+
 
 
 
+---+---+
|   |   |
 
+---+---+
 
 
 
+-----------------------------------+-------+----------+-------+---+---+
|   nortriptyline (PAMELOR) capsule | Given |  | 75 mg |   |   |
|  75 mg  75 mg, Oral, Nightly,     |       | 9 21:53  |       |   |   |
| First dose on Thu 19 at 2200 |       | PST      |       |   |   |
+-----------------------------------+-------+----------+-------+---+---+
 
 
 
+-----------------------------------+---+
|                                   |   |
+-----------------------------------+---+
|   ondansetron (ZOFRAN) injection  |   |
| 4 mg  4 mg, Intravenous, Every 6  |   |
| Hours PRN, Nausea, Vomiting,      |   |
| Starting Wed 19 at 1351      |   |
+-----------------------------------+---+
|                                   |   |
+-----------------------------------+---+
 
 
 
+-----------------------------------+-------+----------+------+---+---+
|   ondansetron (ZOFRAN-ODT)        | Given |  | 4 mg |   |   |
| disintegrating tablet 4 mg  4 mg, |       | 9 10:21  |      |   |   |
|  Oral, Every 6 Hours PRN, Nausea, |       | PST      |      |   |   |
|  Vomiting, Starting Wed 19   |       |          |      |   |   |
| at 1351                           |       |          |      |   |   |
+-----------------------------------+-------+----------+------+---+---+
 
 
 
+---+---+
|   |   |
+---+---+
 
 
 
+-----------------------------------+-------+----------+--------+---+---+
|   oxybutynin (DITROPAN) tablet    | Given |  | 2.5 mg |   |   |
| 2.5 mg  2.5 mg, Oral, 2 Times     |       | 9 13:46  |        |   |   |
| Daily, First dose on 19  |       | PST      |        |   |   |
| at 1430                           |       |          |        |   |   |
+-----------------------------------+-------+----------+--------+---+---+
 
 
 
+-------+----------+--------+---+---+
| Given |  | 2.5 mg |   |   |
|       | 9 21:54  |        |   |   |
|       | PST      |        |   |   |
+-------+----------+--------+---+---+
| Given |  | 2.5 mg |   |   |
|       | 9 08:14  |        |   |   |
|       | PST      |        |   |   |
+-------+----------+--------+---+---+
 
 
 
 
+---+---+
|   |   |
+---+---+
 
 
 
+-----------------------------------+-------+----------+-------+---+---+
|   pantoprazole (PROTONIX) EC      | Given |  | 40 mg |   |   |
| tablet 40 mg  40 mg, Oral, Every  |       | 9 05:39  |       |   |   |
| Morning Before Breakfast, First   |       | PST      |       |   |   |
| dose on Thu 19 at 0630       |       |          |       |   |   |
+-----------------------------------+-------+----------+-------+---+---+
 
 
 
+-------+----------+-------+---+---+
| Given |  | 40 mg |   |   |
|       | 9 05:42  |       |   |   |
|       | PST      |       |   |   |
+-------+----------+-------+---+---+
 
 
 
+---+---+
|   |   |
+---+---+
 
 
 
+-----------------------------------+-------+----------+---------+---+---+
|   rOPINIRole (REQUIP) tablet 0.75 | Given |  | 0.75 mg |   |   |
|  mg  0.75 mg, Oral, Nightly,      |       | 9 22:43  |         |   |   |
| First dose on Wed 19 at 2200 |       | PST      |         |   |   |
+-----------------------------------+-------+----------+---------+---+---+
 
 
 
+-------+----------+---------+---+---+
| Given |  | 0.75 mg |   |   |
|       | 9 21:53  |         |   |   |
|       | PST      |         |   |   |
+-------+----------+---------+---+---+
 
 
 
+---+---+
|   |   |
+---+---+
 
 
 
+-----------------------------------+-------+----------+--------+-------+---+
|   vancomycin (VANCOCIN) 500 mg in | Given |  | 500 mg | 100   |   |
|  sodium chloride (IV) 0.9 % 100   |       | 9 11:02  |        | mL/hr |   |
| mL IVPB  500 mg, Intravenous,     |       | PST      |        |       |   |
| Administer over 60 Minutes, Once, |       |          |        |       |   |
|  Thu 19 at 1100, For 1 dose, |       |          |        |       |   |
|  After dialysis on HD days        |       |          |        |       |   |
 
+-----------------------------------+-------+----------+--------+-------+---+
 
 
 
+---+---+
|   |   |
+---+---+
 in this encounter

## 2019-04-03 NOTE — XMS
Encounter Summary
  Created on: 2019
 
 Cherie Villalobos
 External Reference #: [id number]
 : 48
 Sex: Female
 
 Demographics
 
 
+-----------------------+--------------------------+
| Address               | [address]               |
|                       | [place]  [address] |
+-----------------------+--------------------------+
| Home Phone            | [phone]          |
+-----------------------+--------------------------+
| Preferred Language    | Unknown                  |
+-----------------------+--------------------------+
| Marital Status        |                   |
+-----------------------+--------------------------+
| Yazdanism Affiliation | [id number]                     |
+-----------------------+--------------------------+
| Race                  | Unknown                  |
+-----------------------+--------------------------+
| Ethnic Group          | Unknown                  |
+-----------------------+--------------------------+
 
 
 Author
 
 
+--------------+-----------------------+
| Author       | [employer] |
+--------------+-----------------------+
| Organization | [facility] |
+--------------+-----------------------+
| Address      | Unknown               |
+--------------+-----------------------+
| Phone        | Unavailable           |
+--------------+-----------------------+
 
 
 
 Support
 
 
+---------------+--------------+---------------------+-----------------+
| Name          | Relationship | Address             | Phone           |
+---------------+--------------+---------------------+-----------------+
| Anoop Villalobos | ECON         | [place], | [phone] |
|               |              |  [place]  [address]     |                 |
+---------------+--------------+---------------------+-----------------+
| Jennifer Dickson | ECON         | [place]       | [phone] |
|               |              | [id number]               |                 |
+---------------+--------------+---------------------+-----------------+
 
 
 
 
 Care Team Providers
 
 
+-----------------------+------+-----------------+
| Care Team Member Name | Role | Phone           |
+-----------------------+------+-----------------+
| Ivy Couch DO      | PCP  | [phone] |
+-----------------------+------+-----------------+
 
 
 
 Reason for Visit
 
 
+-----------+------------------------------------------------------------------+
| Reason    | Comments                                                         |
+-----------+------------------------------------------------------------------+
| Follow-up | Here today for follow up for right toes, left foot. States that  |
|           | there is a spot on the top of the amputation that hasn't fully   |
|           | closed up. Has been having a lot of phantom pain in the right    |
|           | foot. Has been taking tramadol. Pain 4/10 currently can get to   |
|           | 8/10.                                                            |
+-----------+------------------------------------------------------------------+
 
 
 
 Encounter Details
 
 
+--------+---------+----------------------+----------------------+----------------------+
| Date   | Type    | Department           | Care Team            | Description          |
+--------+---------+----------------------+----------------------+----------------------+
| / | Office  |   [facility] |   Srinivasan Jordan,  | Closed nondisplaced  |
| 2019   | Visit   |  and Ankle  [address]      | DPM  [address], | fracture of distal   |
|        |         | [address]   |  [address]  [place],  | phalanx of right     |
|        |         | [place], [place]         | [place] [address]             | great toe with       |
|        |         | [address]           | [phone]         | routine healing,     |
|        |         | [phone]         | [phone] (Fax)   | subsequent encounter |
|        |         |                      |                      |  (Primary Dx);       |
|        |         |                      |                      | Post-operative       |
|        |         |                      |                      | state; Open wound of |
|        |         |                      |                      |  toe, subsequent     |
|        |         |                      |                      | encounter            |
+--------+---------+----------------------+----------------------+----------------------+
 
 
 
 Social History
 
 
+---------------+------------+-----------+--------+------------------+
| Tobacco Use   | Types      | Packs/Day | Years  | Date             |
|               |            |           | Used   |                  |
+---------------+------------+-----------+--------+------------------+
| Former Smoker | Cigarettes | 1         | 30     | Quit: 2004 |
+---------------+------------+-----------+--------+------------------+
 
 
 
 
+---------------------+---+---+---+
| Smokeless Tobacco:  |   |   |   |
| Never Used          |   |   |   |
+---------------------+---+---+---+
 
 
 
+------------------------------+
| Comments: quite smoking  |
+------------------------------+
 
 
 
+-------------+-----------+---------+----------+
| Alcohol Use | Drinks/We | oz/Week | Comments |
|             | ek        |         |          |
+-------------+-----------+---------+----------+
| No          |           |         |          |
+-------------+-----------+---------+----------+
 
 
 
+------------------+---------------+
| Sex Assigned at  | Date Recorded |
| Birth            |               |
+------------------+---------------+
| Not on file      |               |
+------------------+---------------+
 as of this encounter
 
 Last Filed Vital Signs
 
 
+-------------------+---------+-------------------------+
| Vital Sign        | Reading | Time Taken              |
+-------------------+---------+-------------------------+
| Blood Pressure    | 109/52  | 2019 11:53 AM PST |
+-------------------+---------+-------------------------+
| Pulse             | 71      | 2019 11:53 AM PST |
+-------------------+---------+-------------------------+
| Temperature       | -       | -                       |
+-------------------+---------+-------------------------+
| Respiratory Rate  | -       | -                       |
+-------------------+---------+-------------------------+
| Oxygen Saturation | 96%     | 2019 11:53 AM PST |
+-------------------+---------+-------------------------+
| Inhaled Oxygen    | -       | -                       |
| Concentration     |         |                         |
+-------------------+---------+-------------------------+
| Weight            | -       | -                       |
+-------------------+---------+-------------------------+
| Height            | -       | -                       |
+-------------------+---------+-------------------------+
| Body Mass Index   | -       | -                       |
+-------------------+---------+-------------------------+
 in this encounter
 
 Progress Notes
 Srinivasan Jordan, REBEKAH - 2019  1:45 PM PSTFormatting of this note may be different fro
m the original.
 
  
 Subjective: 
  Patient ID: Cherie Villalobos is a 69 y.o. female.
 Chief Complaint 
 Patient presents with 
   Follow-up 
   Here today for follow up for right toes, left foot. States that there is a spot on the to
p of the amputation that hasn't fully closed up. Has been having a lot of phantom pain in th
e right foot. Has been taking tramadol. Pain 4/10 currently can get to 8/10. 
  
 
 HPI
 Patient returns today follow-up right great toe wound right foot
 Transmetatarsal amputation left foot remains healed.  She is in the process of obtaining ne
w diabetic shoes and inserts.
 
 The following portions of the patient's history were reviewed and updated as appropriate an
d is available elsewhere in the record: allergies, current medications, past family history,
 past medical history, past social history, past surgical history and problem list.
 
 ROS
 Denies any nausea, vomiting, fever, chills, shortness of breath, chest pain, or calf pain 
 
     
 Objective: 
 /52  | Pulse 71  | SpO2 96% 
 General observation:
 Constitutional: The patient is alert and oriented to person, place and time
 Psychiatric: The patient has normal affect and mood; her speech is normal; her behavior is 
normal.
 Respiratory: Effort normal and breath sounds normal. No accessory muscle usage. No respirat
ory distress.   
 
 Lower Extremity Examination:
 Trans-metatarsal amputation left, site healing well, two very small superficial wounds
 Wound, irregular and linear dorsal right great toe, no erythema, mild edema of toe, improve
d
 Toenail right great is thickened, may come loose over time
 No skin mottling. No hyperesthesias or paresthesias.
 No calf or thigh pain. Negative Homans sign. 
 No pain on palpation of the interphalangeal joint of the hallux right dorsal
 
 Xr Toe Right 2 View
 
 Result Date: 2019
 No radiographic evidence of osteomyelitis seen.  If further assessment is warranted, consid
er correlation with MRI. Potential acute fracture through the base of the distal phalanx. Si
gned by: Gavin South Sign Date/Time: 2019 5:15 PM
 
 Us Lower Extremty  Arterial Right
 
 Result Date: 2019
 1. Right leg shows biphasic inflow with a greater than 50% stenosis at the origin of the dobbins
perficial femoral artery ([id number]).  Biphasic outflow. 2. Two vessel runoff to the right jeanne
t. Signed by: Eugenio Hoffman Sign Date/Time: 2019 3:11 PM
 
 Radiographs: .  See epic chart for complete read
 
 X-ray Foot Left
 
 
 Result Date: 2018
 Amputation of the left forefoot at the level of the proximal metatarsals. Surgical cutaneou
s staples are present. No radiographic evidence for osteomyelitis. Normal alignment of the h
indfoot. Mild degeneration of the midfoot. Electronically signed by Oleksandr Lemus MD on 2018 10:12 AM
 
 Ct Head Without Contrast
 
 Result Date: 2018
 1.  No acute intracranial pathology. Electronically signed by Steven Samano MD on  5:28 PM
 
 X-ray Chest 1 View
 
 Result Date: 2019
 1.  Small loculated pleural fluid collection seen overlying the lateral left heart border i
n the lower left chest.  There may also be a small pleural effusion at the right lung base w
hich is layering posteriorly. 2.  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 Electronically s
igned by Eugenio Hoffman DO on 2019 4:59 PM
 
 Angioplasty 18:
 1. Aorta with narrow aortic bifurcation with moderate stenosis of proximal left common errol
c artery. 
 2. Left SFA with moderate stenosis proximally. 
 3. Single vessel runoff to left foot via left anterior tibial artery. 
 4. Left posterior tibial artery and peroneal artery were occluded with reconstitution of di
stal posterior tibial artery at the left ankle via collaterals. 
 5. If forefoot amputation does not heal, may need popliteal to posterior tibial artery bypa
ss versus antegrade access of left common femoral artery with posterior tibial artery recana
lization. 
  
 Electronically signed by Kirt Mclaughlin MD on 2018 10:51 AM 
 
    
 Assessment and Plan: 
 S/p: TMA left on 18
 DM, ESRD, PAD
 Open wound right great toe
 
 Rx mupirocin
 Continue use of surgical shoe right, removable cast boot left until diabetic shoes and inse
rts obtained
 Then transition to the use
 Follow-up in 1 month
 
 Srinivasan Jordan DPM
  in this encounter
 
 Plan of Treatment
 
 
+--------+---------+-----------+----------------------+-------------+
| Date   | Type    | Specialty | Care Team            | Description |
+--------+---------+-----------+----------------------+-------------+
| 04/10/ | Office  | Podiatry  |   Srinivasan Jordan,  |             |
|    | Visit   |           | DPM  [address] [facility], |             |
|        |         |           |  [address]  [place],  |             |
|        |         |           | [place] [address]             |             |
|        |         |           | [phone]         |             |
|        |         |           | [phone] (Fax)   |             |
 
+--------+---------+-----------+----------------------+-------------+
 as of this encounter
 
 Visit Diagnoses
 
 
+-----------------------------------------------------------------------------------+
| Diagnosis                                                                         |
+-----------------------------------------------------------------------------------+
|   Closed nondisplaced fracture of distal phalanx of right great toe with routine  |
| healing, subsequent encounter - Primary                                           |
+-----------------------------------------------------------------------------------+
|   Post-operative state                                                            |
+-----------------------------------------------------------------------------------+
|   Other postprocedural status                                                     |
+-----------------------------------------------------------------------------------+
|   Open wound of toe, subsequent encounter                                         |
+-----------------------------------------------------------------------------------+

## 2019-04-03 NOTE — XMS
Encounter Summary
  Created on: 2019
 
 Cherie Villalobos
 External Reference #: [id number]
 : 48
 Sex: Female
 
 Demographics
 
 
+-----------------------+--------------------------+
| Address               | [address]               |
|                       | [place]  [address] |
+-----------------------+--------------------------+
| Home Phone            | [phone]          |
+-----------------------+--------------------------+
| Preferred Language    | Unknown                  |
+-----------------------+--------------------------+
| Marital Status        |                   |
+-----------------------+--------------------------+
| [employer] | [id number]                     |
+-----------------------+--------------------------+
| Race                  | Unknown                  |
+-----------------------+--------------------------+
| Ethnic Group          | Unknown                  |
+-----------------------+--------------------------+
 
 
 Author
 
 
+--------------+--------------------------------------------+
| Author       | [facility]  |
|              | [facility] [place]                                |
+--------------+--------------------------------------------+
| Organization | [facility]  |
|              | [facility] [place]                                |
+--------------+--------------------------------------------+
| Address      | Unknown                                    |
+--------------+--------------------------------------------+
| Phone        | Unavailable                                |
+--------------+--------------------------------------------+
 
 
 
 Support
 
 
+---------------+--------------+---------------------+-----------------+
| Name          | Relationship | Address             | Phone           |
+---------------+--------------+---------------------+-----------------+
| Anoop Villalobos | ECON         | [address], | [phone] |
|               |              |  [place]  [address]     |                 |
+---------------+--------------+---------------------+-----------------+
| Jennifer Dickson | ECON         | [place], [place]       | [phone] |
|               |              | [id number]               |                 |
+---------------+--------------+---------------------+-----------------+
 
 
 
 
 Care Team Providers
 
 
+--------------------------+------+-----------------+
| Care Team Member Name    | Role | Phone           |
+--------------------------+------+-----------------+
| Araseli Montelongo Activity  | PCP  | [phone] |
| Professional             |      |                 |
+--------------------------+------+-----------------+
 
 
 
 Reason for Visit
 
 
+-----------+---------------------------------+
| Reason    | Comments                        |
+-----------+---------------------------------+
| Lab Order | Pt due for labs prior to appt.  |
+-----------+---------------------------------+
 
 
 
 Encounter Details
 
 
+--------+-----------+----------------------+----------------------+--------------------+
| Date   | Type      | Department           | Care Team            | Description        |
+--------+-----------+----------------------+----------------------+--------------------+
| / | Telephone |   [facility]          |   Johnie John, | Lab Order (Pt due  |
|    |           | CARDIOLOGY  [address]    |  MD  [address] [place] | for labs prior to  |
|        |           | [place]  [place], |  St.  [place],   | appt. )            |
|        |           |  [place] [address]       | [place] [address]             |                    |
|        |           | [phone]         | [phone]         |                    |
|        |           |                      | [phone] (Fax)   |                    |
+--------+-----------+----------------------+----------------------+--------------------+
 
 
 
 Social History
 
 
+---------------+------------+-----------+--------+------------------+
| Tobacco Use   | Types      | Packs/Day | Years  | Date             |
|               |            |           | Used   |                  |
+---------------+------------+-----------+--------+------------------+
| Former Smoker | Cigarettes | 1         | 30     | Quit: 2004 |
+---------------+------------+-----------+--------+------------------+
 
 
 
+---------------------+---+---+---+
| Smokeless Tobacco:  |   |   |   |
| Never Used          |   |   |   |
+---------------------+---+---+---+
 
 
 
 
+-------------+-----------+---------+----------+
| Alcohol Use | Drinks/We | oz/Week | Comments |
|             | ek        |         |          |
+-------------+-----------+---------+----------+
| No          |           |         |          |
+-------------+-----------+---------+----------+
 
 
 
+------------------+---------------+
| Sex Assigned at  | Date Recorded |
| Birth            |               |
+------------------+---------------+
| Not on file      |               |
+------------------+---------------+
 as of this encounter
 
 Functional Status
 
 
+---------------------------------------------+----------+--------------------+
| Functional Status                           | Response | Date of Assessment |
+---------------------------------------------+----------+--------------------+
| Are you deaf or do you have serious         | No       | 2018         |
| difficulty hearing?                         |          |                    |
+---------------------------------------------+----------+--------------------+
| Are you blind or do you have serious        | No       | 2018         |
| difficulty seeing, even when wearing        |          |                    |
| glasses?                                    |          |                    |
+---------------------------------------------+----------+--------------------+
| Do you have serious difficulty walking or   | No       | 2018         |
| climbing stairs? (5 years old or older)     |          |                    |
+---------------------------------------------+----------+--------------------+
| Do you have difficulty dressing or bathing? | No       | 2018         |
|  (5 years old or older)                     |          |                    |
+---------------------------------------------+----------+--------------------+
| Because of a physical, mental, or emotional | No       | 2018         |
|  condition, do you have difficulty doing    |          |                    |
| errands alone such as visiting a doctor's   |          |                    |
| office or shopping? [15 years old or        |          |                    |
| older)]                                     |          |                    |
+---------------------------------------------+----------+--------------------+
 
 
 
+---------------------------------------------+----------+--------------------+
| Cognitive Status                            | Response | Date of Assessment |
+---------------------------------------------+----------+--------------------+
| Because of a physical, mental, or emotional | No       | 2018         |
|  condition, do you have serious difficulty  |          |                    |
| concentrating, remembering, or making       |          |                    |
| decisions? (5 years old or older)           |          |                    |
+---------------------------------------------+----------+--------------------+
 as of this encounter
 
 Plan of Treatment
 
 
+--------+---------+------------+----------------------+-------------+
 
| Date   | Type    | Specialty  | Care Team            | Description |
+--------+---------+------------+----------------------+-------------+
| / | Office  | Cardiology |   Johnie John, |             |
|    | Visit   |            |  MD  [address] |             |
|        |         |            |  [place],   |             |
|        |         |            | [place] [address]             |             |
|        |         |            | [phone]         |             |
|        |         |            | [phone] (Fax)   |             |
+--------+---------+------------+----------------------+-------------+
 
 
 
+-------------+--------+----------------------+----------------------+
| Name        | Priori | Associated Diagnoses | Order Schedule       |
|             | ty     |                      |                      |
+-------------+--------+----------------------+----------------------+
| Lipid Panel | Routin |   Mixed              | 1 Occurrences        |
|             | e      | hyperlipidemia       | starting 2019  |
|             |        |                      | until 2020     |
+-------------+--------+----------------------+----------------------+
 as of this encounter
 
 Visit Diagnoses
 
 
+----------------------------------+
| Diagnosis                        |
+----------------------------------+
|   Mixed hyperlipidemia - Primary |
+----------------------------------+

## 2019-04-03 NOTE — XMS
Encounter Summary
  Created on: 2019
 
 Cherie Villalobos
 External Reference #: [id number]
 : 48
 Sex: Female
 
 Demographics
 
 
+-----------------------+--------------------------+
| Address               | [address]               |
|                       | [place]  [address] |
+-----------------------+--------------------------+
| Home Phone            | [phone]          |
+-----------------------+--------------------------+
| Preferred Language    | Unknown                  |
+-----------------------+--------------------------+
| Marital Status        |                   |
+-----------------------+--------------------------+
| [employer]ist Affiliation | [id number]                     |
+-----------------------+--------------------------+
| Race                  | Unknown                  |
+-----------------------+--------------------------+
| Ethnic Group          | Unknown                  |
+-----------------------+--------------------------+
 
 
 Author
 
 
+--------------+-----------------------+
| Author       | [employer] |
+--------------+-----------------------+
| Organization | [facility] |
+--------------+-----------------------+
| Address      | Unknown               |
+--------------+-----------------------+
| Phone        | Unavailable           |
+--------------+-----------------------+
 
 
 
 Support
 
 
+---------------+--------------+---------------------+-----------------+
| Name          | Relationship | Address             | Phone           |
+---------------+--------------+---------------------+-----------------+
| Anoop Villalobos | ECON         | [place], | [phone] |
|               |              |  [place]  [address]     |                 |
+---------------+--------------+---------------------+-----------------+
| Jennifer Dickson | ECON         | [place]       | [phone] |
|               |              | [address]               |                 |
+---------------+--------------+---------------------+-----------------+
 
 
 
 
 Care Team Providers
 
 
+-----------------------+------+-----------------+
| Care Team Member Name | Role | Phone           |
+-----------------------+------+-----------------+
| Ivy Montague DO      | PCP  | [phone] |
+-----------------------+------+-----------------+
 
 
 
 Reason for Referral
 Home Health Care (Routine)
 
+-------------+--------------+--------------+--------------+--------------+--------------+
| Status      | Reason       | Specialty    | Diagnoses /  | Referred By  | Referred To  |
|             |              |              | Procedures   | Contact      | Contact      |
+-------------+--------------+--------------+--------------+--------------+--------------+
| New Request |   Specialty  | Home Health  |   Diagnoses  |              |              |
|             | Services     | Services     |  Fall in     | Sanna,  |              |
|             | Required     |              | home,        | MD Angelique    |              |
|             |              |              | initial      | [address]    |              |
|             |              |              | encounter    | [address]         |              |
|             |              |              |              | [place], [place] |              |
|             |              |              |              |  [address]       |              |
|             |              |              |              | Phone:       |              |
|             |              |              |              | [phone] |              |
|             |              |              |              |   Fax:       |              |
|             |              |              |              | [phone] |              |
+-------------+--------------+--------------+--------------+--------------+--------------+
 
 
 
 
 Reason for Visit
 
 
+----------------+-------------+
| Reason         | Comments    |
+----------------+-------------+
| Referral       | Dr. Elliott    |
+----------------+-------------+
| Skin Complaint | right foot  |
+----------------+-------------+
 Auth/Cert
 
+--------+--------+-----------+--------------+--------------+---------------+
| Status | Reason | Specialty | Diagnoses /  | Referred By  | Referred To   |
|        |        |           | Procedures   | Contact      | Contact       |
+--------+--------+-----------+--------------+--------------+---------------+
|        |        |           |   Diagnoses  |              |   Kr 3rd    |
|        |        |           |  Generalized |              | [address] |
|        |        |           |  weakness    |              |  P[facility]ilion     |
|        |        |           | Closed       |              | [address]     |
|        |        |           | displaced    |              | [address]vd          |
|        |        |           | fracture of  |              | [place], [place]  |
|        |        |           | distal       |              | [address]  Phone: |
|        |        |           | phalanx of   |              |  [phone] |
|        |        |           | right great  |              |   Fax:        |
 
|        |        |           | toe, initial |              | [phone]  |
|        |        |           |  encounter   |              |               |
|        |        |           | Fall in      |              |               |
|        |        |           | home,        |              |               |
|        |        |           | initial      |              |               |
|        |        |           | encounter    |              |               |
|        |        |           | Cellulitis   |              |               |
|        |        |           | of right toe |              |               |
|        |        |           |   Fatigue,   |              |               |
|        |        |           | unspecified  |              |               |
|        |        |           | type         |              |               |
|        |        |           |              |              |               |
+--------+--------+-----------+--------------+--------------+---------------+
 
 
 
 
 Encounter Details
 
 
+--------+-----------+----------------------+----------------------+----------------------+
| Date   | Type      | Department           | Care Team            | Description          |
+--------+-----------+----------------------+----------------------+----------------------+
| / | Emergency |   [facility]    |   Cookson, Rachel M, | Fall in home,        |
| 2019 - |           | [facility]     |  DO  [address]  | initial encounter    |
|        |           | [address]        |  [place] [address]  | (Primary Dx);        |
| 02/15/ |           | [facility]  [address]  |  [phone]        | Cellulitis of right  |
| 2019   |           | Blvd  [place], [place]   | Ginny Raya MD  | toe; Closed          |
|        |           | [address]  [phone]  |  [address]      | displaced fracture   |
|        |           |                      | [place] [address]   | of distal phalanx of |
|        |           |                      | [phone]         |  right great toe,    |
|        |           |                      | [phone] (Fax)   | initial encounter;   |
|        |           |                      | Angelique Isidro,   | Generalized          |
|        |           |                      | MD  [address]   | weakness; Fatigue,   |
|        |           |                      | [place] [address]   | unspecified type     |
|        |           |                      | [phone]         |                      |
|        |           |                      | [phone] (Fax)   |                      |
+--------+-----------+----------------------+----------------------+----------------------+
 
 
 
 Social History
 
 
+---------------+------------+-----------+--------+------------------+
| Tobacco Use   | Types      | Packs/Day | Years  | Date             |
|               |            |           | Used   |                  |
+---------------+------------+-----------+--------+------------------+
| Former Smoker | Cigarettes | 1         | 30     | Quit: 2004 |
+---------------+------------+-----------+--------+------------------+
 
 
 
+---------------------+---+---+---+
| Smokeless Tobacco:  |   |   |   |
| Never Used          |   |   |   |
+---------------------+---+---+---+
 
 
 
 
+------------------------------+
| Comments: quite smoking  |
+------------------------------+
 
 
 
+-------------+-----------+---------+----------+
| Alcohol Use | Drinks/We | oz/Week | Comments |
|             | ek        |         |          |
+-------------+-----------+---------+----------+
| No          |           |         |          |
+-------------+-----------+---------+----------+
 
 
 
+------------------+---------------+
| Sex Assigned at  | Date Recorded |
| Birth            |               |
+------------------+---------------+
| Not on file      |               |
+------------------+---------------+
 as of this encounter
 
 Last Filed Vital Signs
 
 
+-------------------+-------------------+-------------------------+
| Vital Sign        | Reading           | Time Taken              |
+-------------------+-------------------+-------------------------+
| Blood Pressure    | 112/57            | 02/15/2019 11:30 AM PST |
+-------------------+-------------------+-------------------------+
| Pulse             | 66                | 02/15/2019 11:30 AM PST |
+-------------------+-------------------+-------------------------+
| Temperature       | 36.7   C (98   F) | 02/15/2019 11:30 AM PST |
+-------------------+-------------------+-------------------------+
| Respiratory Rate  | 18                | 02/15/2019 11:30 AM PST |
+-------------------+-------------------+-------------------------+
| Oxygen Saturation | 96%               | 02/15/2019 11:30 AM PST |
+-------------------+-------------------+-------------------------+
| Inhaled Oxygen    | -                 | -                       |
| Concentration     |                   |                         |
+-------------------+-------------------+-------------------------+
| Weight            | 65.3 kg (144 lb)  | 2019 10:29 PM PST |
+-------------------+-------------------+-------------------------+
| Height            | 177.8 cm (5' 10") | 2019 10:29 PM PST |
+-------------------+-------------------+-------------------------+
| Body Mass Index   | 20.66             | 2019 10:29 PM PST |
+-------------------+-------------------+-------------------------+
 in this encounter
 
 Discharge Summaries
 Angelique Isidro MD - 02/15/2019  2:09 PM PSTFormatting of this note may be different fro
m the original.
 
 Patient:  Cherie Villalobos         :  1950     MRN:  [id number]
 Date of Admission:  2019  
 Date of Discharge:  2/15/2019   
 Treatment Team: 
 Consulting Physician: Srinivasan Abdi DPM
 Consulting Physician: Andrés Elliott MD
 
 Consulting Physician: João Asher MD
 Admitting Provider: Ginny Raya MD
 Discharging Provider:  ANGELIQUE ISIDRO MD
 Discharge Diagnoses: 
 Principal Problem:
   Falls frequently
 Active Problems:
   Depression
   ESRD (end stage renal disease) ([facility])
   Type 2 diabetes mellitus with chronic kidney disease on chronic dialysis, with long-term 
current use of insulin ([facility])
   Anemia in ESRD (end-stage renal disease) ([facility])
   Hypertension, essential
   Dyslipidemia
   Gastroesophageal reflux disease without esophagitis
   Loss of weight
   Severe protein-calorie malnutrition ([facility])
   Chronic systolic congestive heart failure ([facility])
   COPD (chronic obstructive pulmonary disease) ([facility])
   ICD (implantable cardioverter-defibrillator) in place
   Paroxysmal atrial fibrillation ([facility])
   S/P CABG x 2
   S/P mitral valve repair
   PVD (peripheral vascular disease) ([facility])
   CAD (coronary artery disease)
   Macrocytosis
   Lactic acidosis
   Restless legs syndrome
   Overactive bladder
 Resolved Problems:
   * No resolved hospital problems. *
     
 Procedures Performed:
 
 Chief Complaint:
 Referral (Dr. Elliott) and Skin Complaint (right foot )
 
 Hospital Course: 
 
 Frequent falls: 
 Assessment:  It was unclear initially  The exact culprit and it was felt potentially she wa
s a bit dry (as per nephrology who saw her this admission) as she had just had ultrafiltrati
on with HD, versus other culprit.  However the patient did state that when she has recently 
fallen it was in the setting of not using her walker as she had been instructed to as she wa
s only going a very short distance and felt she did not need it.  She will be very diligent 
about using her walker and taking all needed precautions.  
 
 Coronary artery disease with history of CABG, peripheral vascular disease, hypertension and
 hyperlipidemia with history of paroxysmal atrial fibrillation on anticoagulation:.  Will re
sume PTA meds as below on d/c 
 
  
 Overactive bladder: Continue oxybutynin. 
  
 Requip for restless leg syndrome.
  
 Anxiety depression: recommended to Refrain from using benzodiazepinesas much as possible an
d per d/w nephrology will decrease the ativan dose. 
 
  
 
 Diabetes mellitus with diabetic nephropathy with end-stage renal disease on hemodialysis: 
 Continue with current regimen for now, follow, and adjust as needed based on progress. 
 F/u with outpatient providers 
 
  
 
 With respect to her left foot prior injury and prior antibiotics: 
 Assessment:  She was seen by ID here and recently had completed a course of antbx reportedl
y.  They would like to have her off antbx for now and f/u withthem.  Should f/u with her pod
iatrist dr abdi for her foot as well. 
 
 Discharge Exam and Data:
 Vital Signs:
 /57 (BP Location: Right upper arm)  | Pulse 66  | Temp 98 F (36.7 C) (Oral)  | Re
sp 18  | Ht 1.778 m (5' 10")  | Wt 65.3 kg (144 lb)  | SpO2 96%  | Breastfeeding? No  | BMI 
20.66 kg/m 
 I&O Last 3 Shifts:  No intake/output data recorded.
 
 Physical Examination: 
 
 Constitutional: Alert and oriented to person, place, and time. Appears well-developed and w
ell-nourished. 
 
 HEENT: Neck supple, no JVD, non icteric sclera.
 
 Cardiovascular: Normal rate, regular rhythm, normal heart sounds, and intact distal pulses.
  Exam reveals no appreciated gallop or friction rub.  No murmur heard.
 
 Pulmonary/Chest: Effort normal and breath sounds normal. No stridor. No respiratory distres
s.  no wheezes. no rales. exhibits no tenderness. 
 
 Abdominal: Soft. Bowel sounds are normal. exhibits no distension. There is no tenderness. T
here is no rebound and no guarding. 
 
 Extremeties/Musculoskeletal: Normal general range of motion.exhibits no tenderness.  exhibi
ts no edema. Left foot in boot wrapped in dressing, see wound care and ID george . 
   
 Neurological:  Alert and oriented to person, place, and time. Has normal reflexes.  No morteza
s cranial nerve or focal deficit.  Exhibits normal muscle tone. Coordination normal.
 
 Skin: Skin is warm and dry. No rash noted. No erythema. No pallor. 
 
 Psychiatric: Has a normal mood and affect given situation. Behavior is normal. Judgment nor
mal for patient. 
 
 Recent Labs 
 Recent Labs
 Lab 19 
 WBC 5.14 
 HGB 10.1* 
 HCT 30.9* 
 * 
 
 Recent Labs
 Lab 19 
  
 K 4.7 
 CL 96* 
 
 CO2 >40* 
 BUN 9 
 CREATININE 2.96* 
 No results for input(s): INR in the last 168 hours.
 
 Results  
  Procedure Component Value Units Date/Time 
  Blood Culture Set 2 [[id number]] Collected:  19 
  Specimen:  Blood from Blood, peripheral draw Updated:  19 
  Blood Culture Set 1 [[id number]] Collected:  19 165 
  Specimen:  Blood from Blood Line Draw Updated:  19 
  
 
 Recent Radiology Results
 Xr Toe Right 2 View
 
 Result Date: 2019
 No radiographic evidence of osteomyelitis seen.  If further assessment is warranted, consid
er correlation with MRI. Potential acute fracture through the base of the distal phalanx. Si
gned by: Gavin South Sign Date/Time: 2019 5:15 PM
 
 Outstanding Issues:  
 F/u with podiatry, ID, PCP and resume HD.  
 
 Discharge Information:
 Follow up:
 Ivy Montague DO
 [address], [address]
 [facility] [id number] [phone]
 
 Schedule an appointment as soon as possible for a visit
 
 Srinivasan Abdi DPM
 [address] [facility] [facility] [address]
 [facility] [id number] [phone]
 
 Schedule an appointment as soon as possible for a visit
 please continue prior to admission plan
 
 Andrés Elliott MD
 [address] [facility] [address] [phone]
 
 Call
 please call to see when they would like to see you in follow up 
 
 resume care with all providers you were seeing prior to admission 
 
  
 Medication List 
  
 CHANGE how you take these medications  
 LORazepam 1 MG tablet
 Refills:  0
 Commonly known as:  ATIVAN
 Take 0.5 tablets by mouth every 8 (eight) hours as needed for Anxiety. Patient states she h
as the 1mg tablets at home and that they are scored and she will split them in half
 
 What changed:
  how much to take
  additional instructions
  
  
 CONTINUE taking these medications  
 albuterol 108 (90 Base) MCG/ACT inhaler
 Refills:  0
 Commonly known as:  PROVENTIL HFA;VENTOLIN HFA
  
 apixaban 2.5 MG tablet
 QTY:  60 tablet
 Refills:  0
 Commonly known as:  ELIQUIS
 Take 1 tablet by mouth 2 (two) times daily.
  
 atorvastatin 80 MG tablet
 Refills:  0
 Commonly known as:  LIPITOR
  
 carvedilol 12.5 MG tablet
 QTY:  60 tablet
 Refills:  0
 Doctor's comments:  Hold for SBP less than 100
 Hold for HR less than 60
 Commonly known as:  COREG
 Take 1 tablet by mouth 2 (two) times daily with meals.
  
 esomeprazole 40 MG capsule
 Refills:  0
 Commonly known as:  NEXIUM
  
 HYDROcodone-acetaminophen 5-325 MG per tablet
 QTY:  30 tablet
 Refills:  0
 Commonly known as:  NORCO
 Take 1 tablet by mouth every 4 (four) hours as needed.
  
 insulin aspart 100 UNIT/ML injection
 Refills:  0
 Commonly known as:  NOVOLOG
  
 insulin degludec 100 UNIT/ML injection
 Refills:  0
 Commonly known as:  TRESIBA
  
 isosorbide mononitrate 60 MG 24 hr tablet
 QTY:  30 tablet
 Refills:  1
 Take 1 tablet by mouth daily.
  
 losartan 100 MG tablet
 Refills:  0
 Commonly known as:  COZAAR
  
 nitroGLYCERIN 0.4 MG SL tablet
 QTY:  30 tablet
 Refills:  0
 Doctor's comments:  Hold for SBP less than 100
 Commonly known as:  NITROSTAT
 
 Place 1 tablet under the tongue every 5 (five) minutes as needed for Chest pain.
  
 nortriptyline 25 MG capsule
 Refills:  0
 Commonly known as:  PAMELOR
  
 ondansetron 4 MG disintegrating tablet
 Refills:  0
 Commonly known as:  ZOFRAN-ODT
  
 oxybutynin 5 MG tablet
 Refills:  0
 Commonly known as:  DITROPAN
  
 prochlorperazine 5 MG tablet
 QTY:  60 tablet
 Refills:  11
 Doctor's comments:  Please consider 90 day supplies to promote better adherence
 TAKE ONE TABLET BY MOUTH TWICE DAILY AS NEEDED FOR NAUSEA
  
 rOPINIRole 0.25 MG tablet
 Refills:  0
 Commonly known as:  REQUIP
  
 TRINTELLIX 20 MG Tabs
 Refills:  0
 Generic drug:  Vortioxetine HBr
  
  
 You might also be taking other medications not listed above. If you have questions about an
y of your other medications, talk to the person who prescribed them or your Primary Care Pro
vider. 
  
  
  
  
 Where to Get Your Medications 
  
 Information about where to get these medications is not yet available  
 Ask your nurse or doctor about these medications
  LORazepam 1 MG tablet
  
 
 Disposition:  Home
 Condition:  Stable
 Code Status:  Full Code
 
 Discharge took 35 minutes, to include final examination, discussion of admission, and prepa
ration of prescriptions, instructions for on-going care, follow-up and documentation of disc
harge summary.
 
 ANGELIQUE ISIDRO MD
 2:09 PMin this encounter
 
 Discharge Instructions
 Chandler Jean-Baptiste, [place] - 02/15/2019
 HOME HEALTH
 I certify that Cherie Villalobos is under my care and that I had a face to face encounter on  that meets the physician face to face encounter requirements. I certify that based on m
y findings , the patient is in need of intermittent skilled services and a plan for furnishi
 
ng these services to include, but not limited to the services listed in the questions below:
 
 Primary diagnosis for home health: Frequent Falls.
 Additional diagnosis: left great toe gangrene.
 Services needed: Physical & Occupational therapy.
 Patient is homebound because she cannot leave home without assistance of another individual
 and leaving the home requires a considerable and taxing effort. Patient needs the assistanc
e of another individual to leave home because: she has difficulty with ambulation and transf
ers. 
 Physician assuming care for Home Health services: IVY MONTAGUE
 
 FallPrevention
 Falls often occur due to slipping, tripping or losing your balance. Millions of people fall
 every year and injure themselves.Here are ways to reduce your risk of falling again.
  Think about your fall, was there anything that caused your fall that can be fixed, remov
ed, or replaced?
  Make your home safe by keeping walkways clear of objects you may trip over.
  Use non-slip pads under rugs. Do not use area rugs or small throw rugs.
  Use non-slip mats in bathtubs and showers.
  Install handrails and lights on staircases.
  Do not walk in poorly lit areas.
  Do not stand on chairs or wobbly ladders.
  Use caution when reaching overhead or looking upward.This position can cause a loss of
 balance.
  Be sure your shoes fit properly, have non-slip bottoms and are in good condition.
  Wear shoes both inside and out. Avoid going barefoot or wearing slippers.
  Be cautious when going up and down stairs, curbs, and when walking on uneven sidewalks.
  If your balance is poor, consider using a cane or walker.
  If your fall was related to alcohol use, stop or limit alcohol intake.
  If your fall was related to use of sleeping medicines, talk to your doctor about this.
You may need to reduce your dosage at bedtime if you awaken during the night to go to the [facility].
  To reduce the need for nighttime bathroom trips:
  Avoid drinking fluids for several hours before going to bed
  Empty your bladder before going to bed
  Men can keep a urinal at the bedside
  Stay as active as you can. Balance, flexibility, strength, and endurance all come from e
xercise. They all play a role in preventing falls. Ask your healthcare provider which types 
of activity are right for you.
  Get your vision checked on a regular basis.
  If you have pets, know where they are before you stand up or walk so you don't trip over
 them.
  Use night lights.
 Date Last Reviewed: 2015-2018 [employer]. [address] [place] [address]. All righ
ts reserved. This information is not intended as a substitute for professional medical care.
 Always follow your healthcare professional's instructions.
 
 in this encounter
 
 Medications at Time of Discharge
 
 
+----------------------+----------------------+--------+---------+----------+-----------+
| Medication           | Sig.                 | Disp.  | Refills | Start    | End Date  |
|                      |                      |        |         | Date     |           |
+----------------------+----------------------+--------+---------+----------+-----------+
|   albuterol          | Inhale 2 puffs into  |        |         |          |           |
| (PROVENTIL           | the lungs every 4    |        |         |          |           |
| HFA;VENTOLIN HFA)    | (four) hours as      |        |         |          |           |
 
| 108 (90 Base)        | needed for Wheezing. |        |         |          |           |
| MCG/ACT              |                      |        |         |          |           |
| inhalerIndications:  |                      |        |         |          |           |
| states uses 2x       |                      |        |         |          |           |
| monthly average      |                      |        |         |          |           |
+----------------------+----------------------+--------+---------+----------+-----------+
|   esomeprazole       | Take 1 capsule by    |        |         |          |           |
| (NEXIUM) 40 MG       | mouth every day      |        |         |          |           |
| capsule              |                      |        |         |          |           |
+----------------------+----------------------+--------+---------+----------+-----------+
|   insulin aspart     | Inject  into the     |        |         |          |           |
| (NOVOLOG) 100        | skin 3 (three) times |        |         |          |           |
| UNIT/ML injection    |  daily before meals. |        |         |          |           |
|                      |  Sliding scale       |        |         |          |           |
+----------------------+----------------------+--------+---------+----------+-----------+
|   isosorbide         | Take 1 tablet by     |   30   | 1       | 20 |  |
| mononitrate (IMDUR)  | mouth daily.         | tablet |         | 18       | 9         |
| 60 MG 24 hr tablet   |                      |        |         |          |           |
+----------------------+----------------------+--------+---------+----------+-----------+
|   nortriptyline      | Take 25-75 mg by     |        |         |          |           |
| (PAMELOR) 25 MG      | mouth See Admin      |        |         |          |           |
| capsule              | Instructions. Takes  |        |         |          |           |
|                      | 25 mg by mouth every |        |         |          |           |
|                      |  morning and 75 mg   |        |         |          |           |
|                      | every night          |        |         |          |           |
+----------------------+----------------------+--------+---------+----------+-----------+
|   ondansetron        | Take 4 mg by mouth   |        |         | 20 |           |
| (ZOFRAN-ODT) 4 MG    | every 8 (eight)      |        |         | 18       |           |
| disintegrating       | hours as needed.     |        |         |          |           |
| tablet               |                      |        |         |          |           |
+----------------------+----------------------+--------+---------+----------+-----------+
|   oxybutynin         | Take 7.5 mg by mouth |        |         |          |           |
| (DITROPAN) 5 MG      |  2 (two) times       |        |         |          |           |
| tablet               | daily.               |        |         |          |           |
+----------------------+----------------------+--------+---------+----------+-----------+
|   rOPINIRole         | Take 0.75 mg by      |        |         |          |           |
| (REQUIP) 0.25 MG     | mouth nightly.       |        |         |          |           |
| tablet               |                      |        |         |          |           |
+----------------------+----------------------+--------+---------+----------+-----------+
|   apixaban (ELIQUIS) | Take 1 tablet by     |   60   | 0       | 20 |           |
|  2.5 MG tablet       | mouth 2 (two) times  | tablet |         | 18       |           |
|                      | daily.               |        |         |          |           |
+----------------------+----------------------+--------+---------+----------+-----------+
|   atorvastatin       | Take 80 mg by mouth  |        |         | 20 |           |
| (LIPITOR) 80 MG      | nightly.             |        |         | 19       |           |
| tablet               |                      |        |         |          |           |
+----------------------+----------------------+--------+---------+----------+-----------+
|   carvedilol (COREG) | Take 1 tablet by     |   60   | 0       | 20 |  |
|  12.5 MG tablet      | mouth 2 (two) times  | tablet |         | 19       | 0         |
|                      | daily with meals.    |        |         |          |           |
+----------------------+----------------------+--------+---------+----------+-----------+
|                      | Take 1 tablet by     |   30   | 0       | 20 |           |
| HYDROcodone-acetamin | mouth every 4 (four) | tablet |         | 19       |           |
| ophen (NORCO) 5-325  |  hours as needed.    |        |         |          |           |
| MG per tablet        |                      |        |         |          |           |
+----------------------+----------------------+--------+---------+----------+-----------+
|   insulin degludec   | Inject 21 units      |        |         |          |           |
| (TRESIBA) 100        | every night          |        |         |          |           |
| UNIT/ML injection    |                      |        |         |          |           |
+----------------------+----------------------+--------+---------+----------+-----------+
 
|   LORazepam (ATIVAN) | Take 0.5 tablets by  |        |         | 02/15/20 |           |
|  1 MG tablet         | mouth every 8        |        |         | 19       |           |
|                      | (eight) hours as     |        |         |          |           |
|                      | needed for Anxiety.  |        |         |          |           |
|                      | Patient states she   |        |         |          |           |
|                      | has the 1mg tablets  |        |         |          |           |
|                      | at home and that     |        |         |          |           |
|                      | they are scored and  |        |         |          |           |
|                      | she will split them  |        |         |          |           |
|                      | in half              |        |         |          |           |
+----------------------+----------------------+--------+---------+----------+-----------+
|   losartan (COZAAR)  | Take 100 mg by mouth |        |         | 20 |           |
| 100 MG tablet        |  daily.              |        |         | 19       |           |
+----------------------+----------------------+--------+---------+----------+-----------+
|   nitroGLYCERIN      | Place 1 tablet under |   30   | 0       | 20 |  |
| (NITROSTAT) 0.4 MG   |  the tongue every 5  | tablet |         | 19       | 0         |
| SL tablet            | (five) minutes as    |        |         |          |           |
|                      | needed for Chest     |        |         |          |           |
|                      | pain.                |        |         |          |           |
+----------------------+----------------------+--------+---------+----------+-----------+
|   prochlorperazine 5 | TAKE ONE TABLET BY   |   60   | 11      | 20 |           |
|  MG tablet           | MOUTH TWICE DAILY AS | tablet |         | 19       |           |
|                      |  NEEDED FOR NAUSEA   |        |         |          |           |
+----------------------+----------------------+--------+---------+----------+-----------+
|   TRINTELLIX 20 MG   | Take 20 mg by mouth  |        |         | 20 |           |
| TABS                 | every evening.       |        |         | 19       |           |
+----------------------+----------------------+--------+---------+----------+-----------+
 as of this encounter
 
 Progress Notes
 Hortencia Vela RD - 02/15/2019  3:02 PM PSTFormatting of this note may be different from the
 original.
 
  02/15/19 1430 
 Subjective 
 Timepoint Admit
 (Consult - poor appetite) 
 Pt c/o Pt reports she is discharging today.  PO intake much improved.  Pt reports she plans
 on getting set up with a meal service.  Also noted that if she purchases food, caregiver wi
ll prepare meals for her.  Pt states she will eat anything if it prepared for her.  States s
he is discharging and has no needs today 
 Diet Experience 
 Self-selected diet(s) followed Pt reports she sees her RD at [facility] monthly.  She is not on
 a renal diet at this time due to poor po intake.  Pt has been eating a lot of soups even th
ough high in sodium.  Has protein shakes at home, but hasn't been drinking because she read 
an article about a boy who drank too many and  and now she is afraid to drink.   
 Food Intake 
 Amount of Food Pt reports she is eating >50% of meals 
 Type of Food / Meals Renal cardiac  
 Biochemical data, medical tests, and procedures reviewed 
 Biochemical data, medical tests, and procedures reviewed K WNL 
 Recommendations 
 Recommended energy needs Encourage po intake.  Discussed safe use with oral nutrition suppl
ements.  Will follow with RD at [facility].  Monitor and follow as indicated.   
 Nutritional Risk 
 Nutritional risk Moderate 
 Follow up date 19 
 Michael David MD - 02/15/2019  2:11 PM PSTFormatting of this note may be different f
rom the original.
 Infectious Disease
 
 Progress Note
 
 Hospital Day:   LOS: 0 days 
 SUBJECTIVE
 No acute event overnight.  No new complaint.  No fever.
 
 Antibiotics:IV vancomycin was given yesterday x1
 
 Allergy:
 Allergies 
 Allergen Reactions 
   Quinapril Hcl Anaphylaxis 
   Digoxin And Related Other (See Comments) 
   toxic 
   Metaxalone Other (See Comments) 
   Morphine Mental Changes 
   Make her crazy/ "funny feeling in my head"
 Has used hydromorphone in-house 
   Pantoprazole Sodium Nausea and Vomiting 
   Penicillins Rash 
   Tolerates cephalosporins 
   Quinapril Hcl Edema 
   Facial Edema 
   Sudafed [Pseudoephedrine Hcl] Rash 
 
 OBJECTIVE
 Vital Signs:
 /57 (BP Location: Right upper arm)  | Pulse 66  | Temp 98 F (36.7 C) (Oral)  | Re
sp 18  | Ht 1.778 m (5' 10")  | Wt 65.3 kg (144 lb)  | SpO2 96%  | Breastfeeding? No  | BMI 
20.66 kg/m 
 
 Examination:
 
 Constitutional: Resting in the recliner, not in acute distress.She is alert, awake, oriente
d well.
 HEENT: 
 Head: Normocephalic and Atraumatic. 
 Nose: Nose normal. 
 Neck: Neck supple.No palpable mass
 Cardiovascular: Not appreciate rubs or gallops
 Pulmonary/Chest:  Clear to auscultation bilaterally.
 Abdominal: Soft, bowel sounds are present, no distension, no ascites. 
 Musculoskeletal: Right great toe slightly swollen, there is a small skin tear with no surro
unding erythema or purulent drainage.Left TMA stump heals nicely with no evidence of infecti
on.
 Neurological:  No focal neurologic deficits. 
 Skin: Skin is warm and dry. No rash noted. 
 Psychiatric: Alert, oriented well.  No agitation.
 
 LABS:
 Recent Results (from the past 24 hour(s)) 
 Septic Lactic Acid 
  Collection Time: 19  4:55 PM 
 Result Value Ref Range 
  LACTIC ACID 2.7 (H) 0.4 - 2.0 mmol/L 
 C-Reactive Protein 
  Collection Time: 19  5:39 PM 
 Result Value Ref Range 
  CRP 0.8 (H) <0.5 mg/dL 
 CBC with differential 
 
  Collection Time: 19  5:39 PM 
 Result Value Ref Range 
  WBC 5.14 3.80 - 11.00 K/uL 
  RBC 2.91 (L) 3.70 - 5.10 M/uL 
  HGB 10.1 (L) 11.3 - 15.5 g/dL 
  HCT 30.9 (L) 34.0 - 46.0 % 
  .1 (H) 80.0 - 100.0 fl 
  MCH 34.8 (H) 27.0 - 34.0 pg 
  MCHC 32.8 32.0 - 35.5 g/dL 
  RDW SD 61.3 (H) 37 - 53 fl 
   (L) 150 - 400 K/uL 
  MPV 9.3 fl 
  DIFF TYPE AUTOMATED  
  NEUTROPHILS 74.03 % 
  LYMPHOCYTES 20.20 % 
  MONOCYTES 5.16 % 
  EOSINOPHILS 0.03 % 
  BASOPHILS 0.58 % 
  NEUTROPHILS ABS 3.81 1.90 - 7.40 K/uL 
  LYMPHOCYTES ABS 1.04 1.00 - 3.90 K/uL 
  MONOCYTES ABS 0.27 0.00 - 0.80 K/uL 
  EOSINOPHILS ABS 0.00 0.00 - 0.50 K/uL 
  BASOPHILS ABS 0.03 0.00 - 0.10 K/uL 
  MORPHOLOGY 1+  
  Platelet Estimate DECREASED  
 Comprehensive metabolic panel 
  Collection Time: 19  5:39 PM 
 Result Value Ref Range 
  SODIUM 143 135 - 145 mmol/L 
  POTASSIUM 4.7 3.5 - 4.9 mmol/L 
  CHLORIDE 96 (L) 99 - 109 mmol/L 
  CO2 >40 (HH) 23 - 32 mmol/L 
  ANION GAP AGAP UNABLE TO CALCULATE 5 - 20 mmol/L 
  GLUCOSE 160 (H) 65 - 99 mg/dL 
  BUN 9 8 - 25 mg/dL 
  CREATININE 2.96 (H) 0.50 - 1.00 mg/dL 
  BUN/CREAT 3  
  CALCIUM 9.4 8.5 - 10.5 mg/dL 
  TOTAL PROTEIN 6.7 6.3 - 8.2 g/dL 
  Albumin 4.3 3.3 - 4.8 g/dL 
  GLOBULIN 2.4 1.3 - 4.9 g/dL 
  A/G 1.8 1.0 - 2.4 
  TBIL 0.5 0.1 - 1.5 mg/dL 
  ALK PHOS 103 35 - 115 U/L 
  AST 54 (H) 10 - 45 U/L 
  ALT 40 10 - 65 U/L 
  EGFR 16 (L) >60 mL/min/1.73m2 
 Sedimentation Rate (ESR) 
  Collection Time: 19  5:39 PM 
 Result Value Ref Range 
  ESR 13 0 - 30 mm/Hr 
 Vitamin B12 
  Collection Time: 19  5:39 PM 
 Result Value Ref Range 
  VITAMIN B12 553 254 - 1,320 pg/mL 
 Folate 
  Collection Time: 19  5:39 PM 
 Result Value Ref Range 
  FOLATE 8.0 >5.4 ng/mL 
 TSH 
 
  Collection Time: 19  5:39 PM 
 Result Value Ref Range 
  TSH 0.904 0.450 - 5.100 uIU/mL 
 Septic Lactic Acid 
  Collection Time: 19  7:22 PM 
 Result Value Ref Range 
  LACTIC ACID 2.7 (H) 0.4 - 2.0 mmol/L 
 Septic Lactic Acid 
  Collection Time: 19 10:12 PM 
 Result Value Ref Range 
  LACTIC ACID 2.0 0.4 - 2.0 mmol/L 
 POCT glucose 
  Collection Time: 19 10:32 PM 
 Result Value Ref Range 
  GLUCOSE,POC SCREEN 202 (H) 65 - 99 mg/dL 
 POCT glucose 
  Collection Time: 02/15/19  6:01 AM 
 Result Value Ref Range 
  GLUCOSE,POC SCREEN 108 (H) 65 - 99 mg/dL 
 POCT glucose 
  Collection Time: 02/15/19 11:36 AM 
 Result Value Ref Range 
  GLUCOSE,POC SCREEN 231 (H) 65 - 99 mg/dL 
 
 Results  
  Procedure Component Value Units Date/Time 
  Blood Culture Set 2 [[id number]] Collected:  19 1739 
  Specimen:  Blood from Blood, peripheral draw Updated:  19 
  Blood Culture Set 1 [[id number]] Collected:  19 1655 
  Specimen:  Blood from Blood Line Draw Updated:  19 
  
 
 ASSESSMENT & PLAN
 This is a 68-year-old female with multiple comorbidities including End-stage renal disease,
 on regular hemodialysis via fistula at the left upper extremity.  History of severe periphe
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
A resection .  She completed the course of IV antibiotic and the wound is healing nicely wit
h no evidence of ongoing infection.  
 She was admitted at this time for frequent fall at home and noted to have right great toe f
racture, base of distal phalanx.  No intervention recommended at this point.  No evidence of
 ongoing skin and soft tissue infection at present.  Would agree to discharge home and follo
w-up with podiatrist.  Continue to off antibiotic per infectious disease standpoint
 
 Plan discussed with patient at length.  She can follow-up with infectious disease as needed
 if concern for recurrent infection.  Plan discussed with medicine team. Thank you for consu
herb David MD
 2/15/2019
 
 in this encounter
 
 Plan of Treatment
 
 
+--------+---------+-----------+----------------------+-------------+
| Date   | Type    | Specialty | Care Team            | Description |
+--------+---------+-----------+----------------------+-------------+
| 04/10/ | Office  | Podiatry  |   Srinivasan Abdi,  |             |
|    | Visit   |           | DPM  [address], |             |
 
|        |         |           |  [address]  [place],  |             |
|        |         |           | [place] [address]             |             |
|        |         |           | [phone]         |             |
|        |         |           | [phone] (Fax)   |             |
+--------+---------+-----------+----------------------+-------------+
 
 
 
+-------------------------+--------+----------------------+---------------------+
| Name                    | Priori | Associated Diagnoses | Order Schedule      |
|                         | ty     |                      |                     |
+-------------------------+--------+----------------------+---------------------+
| Referral to Home Health | Routin |   Fall in home,      | Ordered: 02/15/2019 |
|                         | e      | initial encounter    |                     |
+-------------------------+--------+----------------------+---------------------+
 as of this encounter
 
 Procedures
 
 
+----------------------+--------+-------------+----------------------+----------------------
+
| Procedure Name       | Priori | Date/Time   | Associated Diagnosis | Comments             
|
|                      | ty     |             |                      |                      
|
+----------------------+--------+-------------+----------------------+----------------------
+
| POCT GLUCOSE         | Routin | 02/15/2019  |                      |   Results for this   
|
|                      | e      | 11:36 AM    |                      | procedure are in the 
|
|                      |        | PST         |                      |  results section.    
|
+----------------------+--------+-------------+----------------------+----------------------
+
| POCT GLUCOSE         | Routin | 02/15/2019  |                      |   Results for this   
|
|                      | e      |  6:01 AM    |                      | procedure are in the 
|
|                      |        | PST         |                      |  results section.    
|
+----------------------+--------+-------------+----------------------+----------------------
+
| POCT GLUCOSE         | Routin | 2019  |                      |   Results for this   
|
|                      | e      | 10:32 PM    |                      | procedure are in the 
|
|                      |        | PST         |                      |  results section.    
|
+----------------------+--------+-------------+----------------------+----------------------
+
| [facility] SEPT[facility]ACT[facility]   | STAT   | 2019  |                      |   Results for this   
|
| ACID                 |        | 10:12 PM    |                      | procedure are in the 
|
|                      |        | PST         |                      |  results section.    
|
+----------------------+--------+-------------+----------------------+----------------------
+
 
| [facility]   | STAT   | 2019  |                      |   Results for this   
|
| ACID                 |        |  7:22 PM    |                      | procedure are in the 
|
|                      |        | PST         |                      |  results section.    
|
+----------------------+--------+-------------+----------------------+----------------------
+
| BLOOD CULTURE, SET 2 | STAT   | 2019  |                      |   Results for this   
|
|                      |        |  5:39 PM    |                      | procedure are in the 
|
|                      |        | PST         |                      |  results section.    
|
+----------------------+--------+-------------+----------------------+----------------------
+
| SEDIMENTATION RATE,  | STAT   | 2019  |                      |   Results for this   
|
| AUTOMATED            |        |  5:39 PM    |                      | procedure are in the 
|
|                      |        | PST         |                      |  results section.    
|
+----------------------+--------+-------------+----------------------+----------------------
+
| CBC W/AUTO DIFF      | STAT   | 2019  |                      |   Results for this   
|
| (REFLEX TO MANUAL)   |        |  5:39 PM    |                      | procedure are in the 
|
|                      |        | PST         |                      |  results section.    
|
+----------------------+--------+-------------+----------------------+----------------------
+
| C-REACTIVE PROTEIN   | STAT   | 2019  |                      |   Results for this   
|
|                      |        |  5:39 PM    |                      | procedure are in the 
|
|                      |        | PST         |                      |  results section.    
|
+----------------------+--------+-------------+----------------------+----------------------
+
| TSH                  | STAT   | 2019  |                      |   Results for this   
|
|                      |        |  5:39 PM    |                      | procedure are in the 
|
|                      |        | PST         |                      |  results section.    
|
+----------------------+--------+-------------+----------------------+----------------------
+
| FOLATE               | Add-On | 2019  |                      |   Results for this   
|
|                      |        |  5:39 PM    |                      | procedure are in the 
|
|                      |        | PST         |                      |  results section.    
|
+----------------------+--------+-------------+----------------------+----------------------
+
| VITAMIN B12          | Add-On | 2019  |                      |   Results for this   
|
|                      |        |  5:39 PM    |                      | procedure are in the 
|
 
|                      |        | PST         |                      |  results section.    
|
+----------------------+--------+-------------+----------------------+----------------------
+
| COMPREHENSIVE        | STAT   | 2019  |                      |   Results for this   
|
| METABOLIC PANEL      |        |  5:39 PM    |                      | procedure are in the 
|
|                      |        | PST         |                      |  results section.    
|
+----------------------+--------+-------------+----------------------+----------------------
+
| XR TOES RIGHT        | ASAP   | 2019  |                      |   Results for this   
|
|                      |        |  5:08 PM    |                      | procedure are in the 
|
|                      |        | PST         |                      |  results section.    
|
+----------------------+--------+-------------+----------------------+----------------------
+
| [facility]PT[facility]ACT[facility]   | STAT   | 2019  |                      |   Results for this   
|
| ACID                 |        |  4:55 PM    |                      | procedure are in the 
|
|                      |        | PST         |                      |  results section.    
|
+----------------------+--------+-------------+----------------------+----------------------
+
| BLOOD CULTURE, SET 1 | STAT   | 2019  |                      |   Results for this   
|
|                      |        |  4:55 PM    |                      | procedure are in the 
|
|                      |        | PST         |                      |  results section.    
|
+----------------------+--------+-------------+----------------------+----------------------
+
| ED INFORMATION       | Routin | 2019  |                      |   Results for this   
|
| EXCHANGE             | e      |  3:21 PM    |                      | procedure are in the 
|
|                      |        | PST         |                      |  results section.    
|
+----------------------+--------+-------------+----------------------+----------------------
+
 in this encounter
 
 Results
 POCT glucose (02/15/2019 11:36 AM)
 
+--------------------+--------------------------+---------------+-----------------+
| Component          | Value                    | Ref Range     | Performed At    |
+--------------------+--------------------------+---------------+-----------------+
| GLUCOSE,POC SCREEN | 231 (H)Comment: Testing  | 65 - 99 mg/dL | [facility] |
|                    | performed at [facility];[address]     |               |                 |
|                    | [address];[place]   |               |                 |
|                    | [id number]                    |               |                 |
+--------------------+--------------------------+---------------+-----------------+
 
 
 
 
+-------------------+------------------+--------------------+--------------+
| Performing        | Address          | City/State/Zipcode | Phone Number |
| Organization      |                  |                    |              |
+-------------------+------------------+--------------------+--------------+
|   [facility] [facility] |   [address] | [place] [address] |              |
+-------------------+------------------+--------------------+--------------+
 POCT glucose (02/15/2019  6:01 AM)
 
+--------------------+--------------------------+---------------+-----------------+
| Component          | Value                    | Ref Range     | Performed At    |
+--------------------+--------------------------+---------------+-----------------+
| GLUCOSE,POC SCREEN | 108 (H)Comment: Testing  | 65 - 99 mg/dL | [facility] |
|                    | performed at [facility];[address]     |               |                 |
|                    | [address];[place]   |               |                 |
|                    | [id number]                    |               |                 |
+--------------------+--------------------------+---------------+-----------------+
 
 
 
+-------------------+------------------+--------------------+--------------+
| Performing        | Address          | City/State/Zipcode | Phone Number |
| Organization      |                  |                    |              |
+-------------------+------------------+--------------------+--------------+
|   [facility] LAB[facility] |   [address] | [place] [address] |              |
+-------------------+------------------+--------------------+--------------+
 POCT glucose (2019 10:32 PM)
 
+--------------------+--------------------------+---------------+-----------------+
| Component          | Value                    | Ref Range     | Performed At    |
+--------------------+--------------------------+---------------+-----------------+
| GLUCOSE,POC SCREEN | 202 (H)Comment: Testing  | 65 - 99 mg/dL | [facility] |
|                    | performed at [facility];[address]     |               |                 |
|                    | [address];[place]   |               |                 |
|                    | [id number]                    |               |                 |
+--------------------+--------------------------+---------------+-----------------+
 
 
 
+-------------------+------------------+--------------------+--------------+
| Performing        | Address          | City/State/Zipcode | Phone Number |
| Organization      |                  |                    |              |
+-------------------+------------------+--------------------+--------------+
|   [facility] LAB[facility] |   [address] | [place] [address] |              |
+-------------------+------------------+--------------------+--------------+
 Septic Lactic Acid (2019 10:12 PM)
 
+-------------+-------------------------+------------------+-----------------+
| Component   | Value                   | Ref Range        | Performed At    |
+-------------+-------------------------+------------------+-----------------+
| LACTIC ACID | 2.0Comment: Testing     | 0.4 - 2.0 mmol/L | [facility] |
|             | performed at [facility]    |                  |                 |
|             | [facility];[place], [place]  |                  |                 |
|             | [id number]                   |                  |                 |
+-------------+-------------------------+------------------+-----------------+
 
 
 
+-------------------+------------------+--------------------+--------------+
| Performing        | Address          | City/State/Zipcode | Phone Number |
| Organization      |                  |                    |              |
 
+-------------------+------------------+--------------------+--------------+
|   [facility] |   [address] | [place] [address] |              |
+-------------------+------------------+--------------------+--------------+
 Septic Lactic Acid (2019  7:22 PM)
 
+-------------+--------------------------+------------------+-----------------+
| Component   | Value                    | Ref Range        | Performed At    |
+-------------+--------------------------+------------------+-----------------+
| LACTIC ACID | 2.7 (H)Comment: Testing  | 0.4 - 2.0 mmol/L | [facility] |
|             | performed at [facility];[address]     |                  |                 |
|             | [address];[place]   |                  |                 |
|             | [id number]                    |                  |                 |
+-------------+--------------------------+------------------+-----------------+
 
 
 
+-------------------+------------------+--------------------+--------------+
| Performing        | Address          | City/State/Zipcode | Phone Number |
| Organization      |                  |                    |              |
+-------------------+------------------+--------------------+--------------+
|   [facility] LAB[facility] |   [address] | [place] [address] |              |
+-------------------+------------------+--------------------+--------------+
 TSH (2019  5:39 PM)
 
+-----------+-------------------------+----------------------+-----------------+
| Component | Value                   | Ref Range            | Performed At    |
+-----------+-------------------------+----------------------+-----------------+
| TSH       | 0.904Comment: Testing   | 0.450 - 5.100 uIU/mL | [facility] |
|           | performed at [facility];[address]    |                      |                 |
|           | [address];[place]  |                      |                 |
|           | [id number]                   |                      |                 |
+-----------+-------------------------+----------------------+-----------------+
 
 
 
+----------+
| Specimen |
+----------+
| Blood    |
+----------+
 
 
 
+-------------------+------------------+--------------------+--------------+
| Performing        | Address          | City/State/Zipcode | Phone Number |
| Organization      |                  |                    |              |
+-------------------+------------------+--------------------+--------------+
|   [facility] LAB[facility] |   [address] | [place] [address] |              |
+-------------------+------------------+--------------------+--------------+
 Folate (2019  5:39 PM)
 
+-----------+--------------------------+------------+--------------+
| Component | Value                    | Ref Range  | Performed At |
+-----------+--------------------------+------------+--------------+
| FOLATE    | 8.0Comment: Testing      | >5.4 ng/mL | [facility]   |
|           | performed at [facility], [address] |            | LABORATORY   |
|           |  [facility],        |            |              |
|           | [place]  [address]     |            |              |
+-----------+--------------------------+------------+--------------+
 
 
 
 
+----------+
| Specimen |
+----------+
| Blood    |
+----------+
 
 
 
+---------------+-------------------------+---------------------+----------------+
| Performing    | Address                 | City/State/Zipcode  | Phone Number   |
| Organization  |                         |                     |                |
+---------------+-------------------------+---------------------+----------------+
|   [facility]  |   [address] [place] [address]  | [place] [address] |   [phone] |
| LABORATORY    | [address]                   |                     |                |
+---------------+-------------------------+---------------------+----------------+
 Vitamin B12 (2019  5:39 PM)
 
+-------------+--------------------------+-------------------+--------------+
| Component   | Value                    | Ref Range         | Performed At |
+-------------+--------------------------+-------------------+--------------+
| VITAMIN B12 | 553Comment: Testing      | 254 - 1,320 pg/mL | [facility]   |
|             | performed at [facility], [address] |                   | LABORATORY   |
|             |  [address],        |                   |              |
|             | [place]  [address]     |                   |              |
+-------------+--------------------------+-------------------+--------------+
 
 
 
+----------+
| Specimen |
+----------+
| Blood    |
+----------+
 
 
 
+---------------+-------------------------+---------------------+----------------+
| Performing    | Address                 | City/State/Zipcode  | Phone Number   |
| Organization  |                         |                     |                |
+---------------+-------------------------+---------------------+----------------+
|   [facility]  |   [address] [facility]  | [place] [address] |   [phone] |
| LABORATORY    | [address]                   |                     |                |
+---------------+-------------------------+---------------------+----------------+
 Blood Culture Set 2 (2019  5:39 PM)
 
+----------------------+------------------------+-----------+-----------------+
| Component            | Value                  | Ref Range | Performed At    |
+----------------------+------------------------+-----------+-----------------+
| Specimen Description | BLOOD, PERIPHERAL DRAW |           | [facility]      |
|                      |                        |           | LABORATORY      |
+----------------------+------------------------+-----------+-----------------+
| SPECIAL REQUESTS     | R HAND                 |           | [facility] |
+----------------------+------------------------+-----------+-----------------+
| CULTURE              | NO GROWTH 6 DAYS       |           | [facility]      |
|                      |                        |           | LABORATORY      |
+----------------------+------------------------+-----------+-----------------+
 
 
 
 
+-----------------+
| Specimen        |
+-----------------+
| Blood - Blood,  |
| peripheral draw |
+-----------------+
 
 
 
+-------------------+-------------------------+---------------------+----------------+
| Performing        | Address                 | City/State/Zipcode  | Phone Number   |
| Organization      |                         |                     |                |
+-------------------+-------------------------+---------------------+----------------+
|   [facility]      |   [address]  | [place] [address] |   [phone] |
| LABORATORY        | [address]                   |                     |                |
+-------------------+-------------------------+---------------------+----------------+
|   [facility] LABORATORY |   [address]        | [place] [address]  |                |
+-------------------+-------------------------+---------------------+----------------+
 Sedimentation Rate (ESR) (2019  5:39 PM)
 
+-----------+-------------------------+--------------+-----------------+
| Component | Value                   | Ref Range    | Performed At    |
+-----------+-------------------------+--------------+-----------------+
| ESR       | 13Comment: Testing      | 0 - 30 mm/Hr | [facility] |
|           | performed at [facility];[address]    |              |                 |
|           | [address];[place]  |              |                 |
|           | [id number]                   |              |                 |
+-----------+-------------------------+--------------+-----------------+
 
 
 
+----------+
| Specimen |
+----------+
| Blood    |
+----------+
 
 
 
+-------------------+------------------+--------------------+--------------+
| Performing        | Address          | City/State/Zipcode | Phone Number |
| Organization      |                  |                    |              |
+-------------------+------------------+--------------------+--------------+
|   [facility] |   [address] | [place] [address] |              |
+-------------------+------------------+--------------------+--------------+
 Comprehensive metabolic panel (2019  5:39 PM)
 
+----------------+--------------------------+-------------------+-----------------+
| Component      | Value                    | Ref Range         | Performed At    |
+----------------+--------------------------+-------------------+-----------------+
| SODIUM         | 143                      | 135 - 145 mmol/L  | [facility] |
+----------------+--------------------------+-------------------+-----------------+
| POTASSIUM      | 4.7                      | 3.5 - 4.9 mmol/L  | [facility] |
+----------------+--------------------------+-------------------+-----------------+
| CHLORIDE       | 96 (L)                   | 99 - 109 mmol/L   | [facility] |
+----------------+--------------------------+-------------------+-----------------+
| CO2            | >40 (HH)Comment: CALLED  | 23 - 32 mmol/L    | [facility] |
|                | NURSING UNITREAD BACK    |                   |                 |
|                | RESULTS VERIFIEDCALLED   |                   |                 |
 
|                | TO RN IN ED AT 1830 BY   |                   |                 |
|                | LGJ                      |                   |                 |
|                |                          |                   |                 |
+----------------+--------------------------+-------------------+-----------------+
| ANION GAP AGAP | UNABLE TO CALCULATE      | 5 - 20 mmol/L     | [facility] |
+----------------+--------------------------+-------------------+-----------------+
| GLUCOSE        | 160 (H)                  | 65 - 99 mg/dL     | [facility] |
+----------------+--------------------------+-------------------+-----------------+
| BUN            | 9                        | 8 - 25 mg/dL      | [facility] |
+----------------+--------------------------+-------------------+-----------------+
| CREATININE     | 2.96 (H)                 | 0.50 - 1.00 mg/dL | [facility] |
+----------------+--------------------------+-------------------+-----------------+
| BUN/CREAT      | 3                        |                   | [facility] |
+----------------+--------------------------+-------------------+-----------------+
| CALCIUM        | 9.4                      | 8.5 - 10.5 mg/dL  | [facility] |
+----------------+--------------------------+-------------------+-----------------+
| TOTAL PROTEIN  | 6.7                      | 6.3 - 8.2 g/dL    | [facility] |
+----------------+--------------------------+-------------------+-----------------+
| Albumin        | 4.3                      | 3.3 - 4.8 g/dL    | [facility] |
+----------------+--------------------------+-------------------+-----------------+
| GLOBULIN       | 2.4                      | 1.3 - 4.9 g/dL    | [facility] |
+----------------+--------------------------+-------------------+-----------------+
| A/G            | 1.8                      | 1.0 - 2.4         | [facility] |
+----------------+--------------------------+-------------------+-----------------+
| TBIL           | 0.5                      | 0.1 - 1.5 mg/dL   | [facility] |
+----------------+--------------------------+-------------------+-----------------+
| ALK PHOS       | 103                      | 35 - 115 U/L      | [facility] |
+----------------+--------------------------+-------------------+-----------------+
| AST            | 54 (H)                   | 10 - 45 U/L       | [facility] |
+----------------+--------------------------+-------------------+-----------------+
| ALT            | 40                       | 10 - 65 U/L       | [facility] |
+----------------+--------------------------+-------------------+-----------------+
| EGFR           | 16 (L)Comment: GFR <60:  | >60 mL/min/1.73m2 | [facility] |
|                | CHRONIC KIDNEY DISEASE,  |                   |                 |
|                | IF FOUND OVER A 3 MONTH  |                   |                 |
|                | PERIOD.GFR <15: KIDNEY   |                   |                 |
|                | FAILURE.FOR       |                   |                 |
|                | AMERICANS, MULTIPLY THE  |                   |                 |
|                | CALCULATED GFR BY        |                   |                 |
|                | 1.210.This eGFR is       |                   |                 |
|                | calculated using the     |                   |                 |
|                | MDRD IDMS traceable      |                   |                 |
|                | equation.Testing         |                   |                 |
|                | performed at [facility];[address]     |                   |                 |
|                | [facility];[place], [place]   |                   |                 |
|                | [address]                    |                   |                 |
+----------------+--------------------------+-------------------+-----------------+
 
 
 
+----------+
| Specimen |
+----------+
| Blood    |
+----------+
 
 
 
+-------------------+------------------+--------------------+--------------+
| Performing        | Address          | City/State/Zipcode | Phone Number |
 
| Organization      |                  |                    |              |
+-------------------+------------------+--------------------+--------------+
|   [facility] |   [address] | [place] [address] |              |
+-------------------+------------------+--------------------+--------------+
 CBC with differential (2019  5:39 PM)
 
+-------------------+------------------------+-------------------+-----------------+
| Component         | Value                  | Ref Range         | Performed At    |
+-------------------+------------------------+-------------------+-----------------+
| WBC               | 5.14                   | 3.80 - 11.00 K/uL | [facility] |
+-------------------+------------------------+-------------------+-----------------+
| RBC               | 2.91 (L)               | 3.70 - 5.10 M/uL  | [facility] |
+-------------------+------------------------+-------------------+-----------------+
| HGB               | 10.1 (L)               | 11.3 - 15.5 g/dL  | [facility] |
+-------------------+------------------------+-------------------+-----------------+
| HCT               | 30.9 (L)               | 34.0 - 46.0 %     | [facility] |
+-------------------+------------------------+-------------------+-----------------+
| MCV               | 106.1 (H)              | 80.0 - 100.0 fl   | [facility] |
+-------------------+------------------------+-------------------+-----------------+
| MCH               | 34.8 (H)               | 27.0 - 34.0 pg    | [facility] |
+-------------------+------------------------+-------------------+-----------------+
| MCHC              | 32.8                   | 32.0 - 35.5 g/dL  | [facility] |
+-------------------+------------------------+-------------------+-----------------+
| RDW SD            | 61.3 (H)               | 37 - 53 fl        | [facility] |
+-------------------+------------------------+-------------------+-----------------+
| PLT               | 145 (L)                | 150 - 400 K/uL    | [facility] |
+-------------------+------------------------+-------------------+-----------------+
| MPV               | 9.3                    | fl                | [facility] |
+-------------------+------------------------+-------------------+-----------------+
| DIFF TYPE         | AUTOMATED              |                   | [facility] |
+-------------------+------------------------+-------------------+-----------------+
| NEUTROPHILS       | 74.03                  | %                 | [facility] |
+-------------------+------------------------+-------------------+-----------------+
| LYMPHOCYTES       | 20.20                  | %                 | [facility] |
+-------------------+------------------------+-------------------+-----------------+
| MONOCYTES         | 5.16                   | %                 | [facility] |
+-------------------+------------------------+-------------------+-----------------+
| EOSINOPHILS       | 0.03                   | %                 | [facility] |
+-------------------+------------------------+-------------------+-----------------+
| BASOPHILS         | 0.58                   | %                 | [facility] |
+-------------------+------------------------+-------------------+-----------------+
| NEUTROPHILS ABS   | 3.81                   | 1.90 - 7.40 K/uL  | [facility] |
+-------------------+------------------------+-------------------+-----------------+
| LYMPHOCYTES ABS   | 1.04                   | 1.00 - 3.90 K/uL  | [facility] |
+-------------------+------------------------+-------------------+-----------------+
| MONOCYTES ABS     | 0.27                   | 0.00 - 0.80 K/uL  | [facility] |
+-------------------+------------------------+-------------------+-----------------+
| EOSINOPHILS ABS   | 0.00                   | 0.00 - 0.50 K/uL  | [facility] |
+-------------------+------------------------+-------------------+-----------------+
| BASOPHILS ABS     | 0.03                   | 0.00 - 0.10 K/uL  | [facility] |
+-------------------+------------------------+-------------------+-----------------+
| MORPHOLOGY        | 1+                     |                   | [facility] |
|                   | Comment:               |                   |                 |
|                   | ANISO                  |                   |                 |
|                   | 1+                     |                   |                 |
|                   | MACRO                  |                   |                 |
|                   | NORMAL PLT MORPH       |                   |                 |
|                   |                        |                   |                 |
+-------------------+------------------------+-------------------+-----------------+
| Platelet Estimate | DECREASEDComment:      |                   | [facility] |
 
|                   | Testing performed at   |                   |                 |
|                   | [facility];[address] [place] [address]          |                   |                 |
|                   | [place] [address] |                   |                 |
+-------------------+------------------------+-------------------+-----------------+
 
 
 
+----------+
| Specimen |
+----------+
| Blood    |
+----------+
 
 
 
+-------------------+------------------+--------------------+--------------+
| Performing        | Address          | City/State/Zipcode | Phone Number |
| Organization      |                  |                    |              |
+-------------------+------------------+--------------------+--------------+
|   [facility] LABORATORY |   [address] | [place] [address] |              |
+-------------------+------------------+--------------------+--------------+
 C-Reactive Protein (2019  5:39 PM)
 
+-----------+--------------------------+------------+-----------------+
| Component | Value                    | Ref Range  | Performed At    |
+-----------+--------------------------+------------+-----------------+
| CRP       | 0.8 (H)Comment: Testing  | <0.5 mg/dL | [facility] |
|           | performed at [facility]     |            |                 |
|           | [address];[place]   |            |                 |
|           | [id number]                    |            |                 |
+-----------+--------------------------+------------+-----------------+
 
 
 
+----------+
| Specimen |
+----------+
| Blood    |
+----------+
 
 
 
+-------------------+------------------+--------------------+--------------+
| Performing        | Address          | City/State/Zipcode | Phone Number |
| Organization      |                  |                    |              |
+-------------------+------------------+--------------------+--------------+
|   [facility] LABORAT[facility] |   [address] | [place] [address] |              |
+-------------------+------------------+--------------------+--------------+
 XR Toe Right 2 View (2019  5:08 PM)
 
+----------------------------------------------------------------------+--------------+
| Impressions                                                          | Performed At |
+----------------------------------------------------------------------+--------------+
|   No radiographic evidence of osteomyelitis seen.    If further      |   [facility]     |
| assessment  is warranted, consider correlation with MRI.  Potential  | RADIOLOGY    |
| acute fracture through the base of the distal phalanx.  Signed by:   |              |
| Gavin South  Sign Date/Time: 2019 5:15 PM                      |              |
+----------------------------------------------------------------------+--------------+
 
 
 
 
+------------------------------------------------------------------------+--------------+
| Narrative                                                              | Performed At |
+------------------------------------------------------------------------+--------------+
|   RIGHT TOE  CLINICAL INFORMATION:  Other (see comments) Pain, concern |   [facility]     |
|  for osteomyelitis.  COMPARISON:  XR FOOT LEFT (2018); XR FOOT   | RADIOLOGY    |
| LEFT (2018); XR FOOT LEFT  (2018);  FINDINGS:  Advanced     |              |
| degenerative changes identified involving the interphalangeal  joint   |              |
| of the 1st digit.    On the lateral view, there appears to be  lucency |              |
|  through the base of the phalanx, potentially reflecting an  acute     |              |
| fracture.    No erosive or destructive changes appreciated to  suggest |              |
|  osteomyelitis.                                                        |              |
+------------------------------------------------------------------------+--------------+
 
 
 
+------------------------------------------------------------------------+
| Procedure Note                                                         |
+------------------------------------------------------------------------+
|   Denny, Rad Results In - 2019  5:18 PM PST  RIGHT TOE             |
| CLINICAL INFORMATION:                                                  |
| Other (see comments) Pain, concern for osteomyelitis.                  |
| COMPARISON:                                                            |
| XR FOOT LEFT (2018); XR FOOT LEFT (2018); XR FOOT LEFT     |
| (2018);                                                           |
| FINDINGS:                                                              |
| Advanced degenerative changes identified involving the interphalangeal |
| joint of the 1st digit.  On the lateral view, there appears to be      |
| lucency through the base of the phalanx, potentially reflecting an     |
| acute fracture.  No erosive or destructive changes appreciated to      |
| suggest osteomyelitis.                                                 |
| IMPRESSION:                                                            |
| No radiographic evidence of osteomyelitis seen.  If further assessment |
| is warranted, consider correlation with MRI.                           |
| Potential acute fracture through the base of the distal phalanx.       |
| Signed by: Gavin South                                                 |
| Sign Date/Time: 2019 5:15 PM                                     |
+------------------------------------------------------------------------+
 
 
 
+--------------------+------------------+--------------------+--------------+
| Performing         | Address          | City/State/Zipcode | Phone Number |
| Organization       |                  |                    |              |
+--------------------+------------------+--------------------+--------------+
|   [facility] RADIOLOGY |   [address] | [place] [address] |              |
+--------------------+------------------+--------------------+--------------+
 Septic Lactic Acid (2019  4:55 PM)
 
+-------------+--------------------------+------------------+-----------------+
| Component   | Value                    | Ref Range        | Performed At    |
+-------------+--------------------------+------------------+-----------------+
| LACTIC ACID | 2.7 (H)Comment: Testing  | 0.4 - 2.0 mmol/L | [facility] |
|             | performed at [facility]     |                  |                 |
|             | [address];[place]   |                  |                 |
|             | [id number]                    |                  |                 |
+-------------+--------------------------+------------------+-----------------+
 
 
 
 
+-------------------+------------------+--------------------+--------------+
| Performing        | Address          | City/State/Zipcode | Phone Number |
| Organization      |                  |                    |              |
+-------------------+------------------+--------------------+--------------+
|   [facility] |   [address] | [place] [address] |              |
+-------------------+------------------+--------------------+--------------+
 Blood Culture Set 1 (2019  4:55 PM)
 
+----------------------+------------------+-----------+-----------------+
| Component            | Value            | Ref Range | Performed At    |
+----------------------+------------------+-----------+-----------------+
| Specimen Description | BLOOD, LINE DRAW |           | [facility]      |
|                      |                  |           | LABORATORY      |
+----------------------+------------------+-----------+-----------------+
| SPECIAL REQUESTS     | R FOREARM        |           | [facility] |
+----------------------+------------------+-----------+-----------------+
| CULTURE              | NO GROWTH 6 DAYS |           | [facility]      |
|                      |                  |           | LABORATORY      |
+----------------------+------------------+-----------+-----------------+
 
 
 
+---------------------+
| Specimen            |
+---------------------+
| Blood - Blood Line  |
| Draw                |
+---------------------+
 
 
 
+-------------------+-------------------------+---------------------+----------------+
| Performing        | Address                 | City/State/Zipcode  | Phone Number   |
| Organization      |                         |                     |                |
+-------------------+-------------------------+---------------------+----------------+
|   [facility]      |   [address]  | [place] [address] |   [phone] |
| LABORATORY        | [address]                   |                     |                |
+-------------------+-------------------------+---------------------+----------------+
|   [facility] |   [address]        | [place] [address]  |                |
+-------------------+-------------------------+---------------------+----------------+
 ED INFORMATION EXCHANGE (2019  3:21 PM)
 
+------------------------------------------------------------------------+--------------+
| Narrative                                                              | Performed At |
+------------------------------------------------------------------------+--------------+
|   [id number]     Criteria Met         Care        |   ED         |
| Guidelines      10 in 12     Security and Safety  No recent Security   | INFORMATION  |
| Events currently on file     ED Care Guidelines from [employer] -        | EXCHANGE     |
| [place]  Last Updated: 18 10:16 AM         Other Information:    |              |
| Currently being seen by [facility] in [place] for mental health        |              |
| [phone]  These are guidelines and the provider should    |              |
| exercise clinical judgment when providing care.  ED Care Guidelines    |              |
| from [facility]  Last Updated: 17 10:44 AM         Care |              |
|  Coordination:  This patient has been identified as having at          |              |
| leastEmergency Departments visits in the 12 months immediately         |              |
| preceding the date these guidelines were entered.Patient requires      |              |
| education on appropriate ED usage.Emphasize the importance of using    |              |
| outpatient   medical services for the treatment of chronic conditions. |              |
|   Please contact Community Health WorkerWillard at [phone] if   |              |
| patient is seen in ED.  These are guidelines and the provider should   |              |
 
| exercise clinical judgment when providing care.  These are guidelines  |              |
| and the provider should exercise clinical judgment when providing      |              |
| care.           Prescription Drug Report (12 Mo.)  PDMP query found no |              |
|  report.        E.D. Visit Count (12 mo.)  Facility Visits Low Acuity  |              |
|   [facility] 18 0   [facility] 2 0   [facility]   |              |
| [facility] 5 0   Total 25 0   Note: Visits indicate total |              |
|  known visits. Medicaid Low Acuity Dx are the number of primary        |              |
| diagnoses on the Medicaid's Low Acuity dx list.         Recent         |              |
| Emergency Department Visit Summary  Showing 10 most recent visits out  |              |
| of 25 in the past 12 months  Date Facility City State Type Diagnoses   |              |
| or Chief Complaint   2019 [facility]       |              |
| Emergency       2019 [facility]            |              |
| Emergency          Multiple Falls        Referral        Circulatory   |              |
| Problem      2019 [employer] [facility] [place] Emergency      |              |
|      ABD PAIN        Other chest pain      2019 [facility]         |              |
| [facility] Emergency          Foot Pain      2019 |              |
|  [facility] Emergency          Chest Pain          |              |
| Nausea        Shortness of Breath        Chest pain, unspecified       |              |
|      Elevated blood-pressure reading, without diagnosis of             |              |
| hypertension        Presence of aortocoronary bypass graft             |              |
| Other specified postprocedural states      2019 [employer]  |              |
| [facility] [place] Emergency          CHEST PAIN        Abnormal levels  |              |
| of other serum enzymes        Personal history of other diseases of    |              |
| the circulatory system        Chest pain, unspecified      2019 |              |
|  [employer] [place] Emergency          FISTULA BLEEDING    |              |
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
| grafts, initial encounter      Dec 22, 2018 [employer]       |              |
| [place] Emergency          CHEST PAIN        Type 2 diabetes         |              |
| mellitus with hyperglycemia        Chest pain, unspecified      Nov    |              |
| 2018 [facility] [place] Emergency    Chief Complaint:   |              |
| SOB      2018 [employer] [place] Emergency         |              |
|     FALL        Unspecified fall, initial encounter        Dependence  |              |
| on renal dialysis        End stage renal disease            Recent     |              |
| Inpatient Visit Summary  Showing 10 most recent visits out of 11 in    |              |
| the past 12 months  Date Facility City State Type Diagnoses or Chief   |              |
| Complaint   2019 [facility] Vascular       |              |
| Surgery          Foot Pain        End stage renal disease              |              |
| Dependence on renal dialysis        Peripheral vascular disease,       |              |
| unspecified        Anemia in chronic kidney disease        Other       |              |
| disorders of plasma-protein metabolism, not elsewhere classified       |              |
|      Other specified personal risk factors, not elsewhere classified   |              |
|     Dec 27, 2018 [facility] Recovery               |              |
|     Peripheral arterial disease, osteomyelitis left foot        Other  |              |
| acute osteomyelitis, left ankle and foot        Long term (current)    |              |
| use of antibiotics        End stage renal disease        Anemia in     |              |
| chronic kidney disease      Dec 5, 2018 [facility] |              |
|  General Medicine          Peripheral vascular disease, unspecified    |              |
|      End stage renal disease        Dependence on renal dialysis       |              |
|      Long term (current) use of insulin        Other chronic           |              |
| osteomyelitis, left ankle and foot        Type 2 diabetes mellitus     |              |
| with diabetic chronic kidney disease        Essential (primary)        |              |
| hypertension      2018 [facility]          |              |
| Inpatient          Upper GIB        Other chronic osteomyelitis,       |              |
| unspecified ankle and foot        End stage renal disease        Other |              |
|  specified personal risk factors, not elsewhere classified             |              |
| Chronic systolic (congestive) heart failure        Anemia in chronic   |              |
| kidney disease        Other disorders of plasma-protein metabolism,    |              |
| not elsewhere classified        Moderate protein-calorie malnutrition  |              |
|        Other disorders of phosphorus metabolism      2018      |              |
| [place] [place] [place] [facility]          1. Type 2      |              |
 
| diabetes mellitus with other specified complication        2. Urinary  |              |
| tract infection, site not specified        3. Unspecified              |              |
| protein-calorie malnutrition        4. Other chronic osteomyelitis,    |              |
| unspecified site        5. Hypertensive heart and chronic kidney       |              |
| disease with heart failure and with stage 5 chronic kidney disease, or |              |
|  end stage renal disease        6. Chronic diastolic (congestive)      |              |
| heart failure        7. Other osteomyelitis, ankle and foot        8.  |              |
| Type 2 diabetes mellitus with foot ulcer        9. Atherosclerotic     |              |
| heart disease of native coronary artery without angina pectoris        |              |
|  10. Chronic obstructive pulmonary disease, unspecified      ,    |              |
|  [facility] [place] [place] [facility]          1. Benign |              |
|  paroxysmal vertigo, unspecified ear        2. End stage renal disease |              |
|         3. Hypertensive chronic kidney disease with stage 5 chronic    |              |
| kidney disease or end stage renal disease        4. Urinary tract      |              |
| infection, site not specified        5. Hypertensive heart and chronic |              |
|  kidney disease with heart failure and with stage 5 chronic kidney     |              |
| disease, or end stage renal disease        6. Kidney transplant status |              |
|         7. Unspecified systolic (congestive) heart failure        8.   |              |
| Syncope and collapse        9. Type 2 diabetes mellitus with diabetic  |              |
| chronic kidney disease        10. Atherosclerotic heart disease of     |              |
| native coronary artery without angina pectoris      Sep 15, 2018       |              |
| [facility] [place] [facility]          Acute     |              |
| ischemic heart disease, unspecified        Long term (current) use of  |              |
| insulin        Dependence on renal dialysis        End stage renal     |              |
| disease        Type 2 diabetes mellitus with diabetic chronic kidney   |              |
| disease        Essential (primary) hypertension        Anemia in       |              |
| chronic kidney disease      2018 [facility] |              |
|  Medical Surgical          0. Cough        1. Pneumonia due to         |              |
| Pseudomonas        2. End stage renal disease        3. Hypertensive   |              |
| heart and chronic kidney disease with heart failure and with stage 5   |              |
| chronic kidney disease, or end stage renal disease        4. Cachexia  |              |
|        5. Chronic obstructive pulmonary disease with acute lower       |              |
| respiratory infection        6. Type 2 diabetes mellitus with diabetic |              |
|  chronic kidney disease        7. Heart failure, unspecified        8. |              |
|  Hyperlipidemia, unspecified        9. Gastro-esophageal reflux        |              |
| disease without esophagitis      2018 [facility]       |              |
| [facility]. [place] Medical Surgical          0. Other chest pain        1.      |              |
| Gastro-esophageal reflux disease without esophagitis        2. End     |              |
| stage renal disease        3. Hypertensive heart and chronic kidney    |              |
| disease with heart failure and with stage 5 chronic kidney disease, or |              |
|  end stage renal disease        4. Chronic systolic (congestive) heart |              |
|  failure        5. Atherosclerotic heart disease of native coronary    |              |
| artery with other forms of angina pectoris        6. Type 2 diabetes   |              |
| mellitus with diabetic chronic kidney disease        7.                |              |
| Hyperlipidemia, unspecified        8. Major depressive disorder,       |              |
| single episode, unspecified        9. Chronic obstructive pulmonary    |              |
| disease, unspecified      Mar 6, 2018 [facility]     |              |
| [facility]. [place] Cardiology          Heart Failure        Need for iv access  |              |
|        Type 2 diabetes mellitus with other specified complication      |              |
|      Long term (current) use of insulin        Paroxysmal atrial       |              |
| fibrillation        Anemia in chronic kidney disease                   |              |
| Atherosclerotic heart disease of native coronary artery without angina |              |
|  pectoris        Cardiomyopathy, unspecified        End stage renal    |              |
| disease        Other specified postprocedural states            Care   |              |
| Providers  Provider PRC Type Phone Fax Service Dates   HEADINGS, SANTIAGO, |              |
|  F.N.P. Nurse Practitioner: Family     Current      Marco Antonio Martinez     |              |
| /Care Coordinator [phone]    2019 -          |              |
| Current      [facility] Primary [facility] [phone]    2019 |              |
|  - Current      [facility] Primary            |              |
| Care    [phone] Current      [facility]      |              |
 
| [facility]     Current      MANOLO GONZALEZ Primary Care         |              |
| Current      [email or web address] [facility] Provider [phone] |              |
|  [phone] Current      [employer] Case or Care Manager     Current      |              |
|  MIRTA Goel Case or Care Manager [phone]  |              |
| [phone] Current      Jairo Gutierrez MD Other     Current     |              |
|      [employer]  This patient has registered at the [facility]      |              |
| [facility] Emergency Department   For more information    |              |
| visit:                                                                 |              |
| [email or web address] |              |
| [id number]   The above information is provided for the sole purpose of     |              |
| patient treatment. Use of this information beyond the terms of Data    |              |
| Sharing Memorandum of Understanding and License Agreement is           |              |
| prohibited. In certain cases not all visits may be represented.        |              |
| Consult the aforementioned facilities for additional information.      |              |
| 2019 [facility] - [place], [place] -      |              |
| [email or web address]                                         |              |
+------------------------------------------------------------------------+--------------+
 
 
 
+-------------------------------------------------------------------------------------------
--------------------------------------------------------------------------------------------
--------------------+
| Procedure Note                                                                            
                                                                                            
                    |
+-------------------------------------------------------------------------------------------
--------------------------------------------------------------------------------------------
--------------------+
|   Interface, Lab - 2019  3:22 PM PST  Formatting of this note may be different      
                                                                                            
                    |
| from the original.[id number] [id number]  Care Guidelines  10 in     
                                                                                            
                    |
| 12Security and SafetyNo recent Security Events currently on fileED Care Guidelines from   
                                                                                            
                    |
| [facility] Updated: 18 10:16 AM  Other Information:Currently being      
                                                                                            
                    |
| seen by [facility] in [place] for mental health concerns.[phone] are            
                                                                                            
                    |
| guidelines and the provider should exercise clinical judgment when providing care.ED      
                                                                                            
                    |
| Care Guidelines from [facility] Updated: 17 10:44 AM  Care             
                                                                                            
                    |
| Coordination:This patient has been identified as having at leastEmergency Departments     
                                                                                            
                    |
| visits in the 12 months immediately preceding the date these guidelines were              
                                                                                            
                    |
| entered.Patient requires education on appropriate ED usage.Emphasize the importance of    
                                                                                            
                    |
| using outpatient medical services for the treatment of chronic conditions.Please contact  
 
                                                                                            
                    |
|  Community Health WorkerWillard at [phone] if patient is seen in ED.These are      
                                                                                            
                    |
| guidelines and the provider should exercise clinical judgment when providing care.These   
                                                                                            
                    |
| are guidelines and the provider should exercise clinical judgment when providing          
                                                                                            
                    |
| care.Prescription Drug Report (12 Mo.)PDMP query found no report.E.D. Visit Count (12     
                                                                                            
                    |
| mo.)Facility Visits Low Acuity [facility] 18 0 [facility] 2 0    
                                                                                            
                    |
| [facility] 5 0 Total 25 0 Note: Visits indicate total known visits.   
                                                                                            
                    |
| Medicaid Low Acuity Dx are the number of primary diagnoses on the Medicaid's Low Acuity   
                                                                                            
                    |
| dx list.  Recent Emergency Department Visit SummaryShowing 10 most recent visits out of   
                                                                                            
                    |
| 25 in the past 12 monthsDate Facility City State Type Diagnoses or Chief Complaint Feb    
                                                                                            
                    |
| 2019 [facility] [place] Emergency   2019 [facility] [place]     
                                                                                            
                    |
| [facility] Emergency    Multiple Falls    Referral    Circulatory Problem  2019     
                                                                                            
                    |
| [facility] [place] Emergency    ABD PAIN    Other chest pain  2019    
                                                                                            
                    |
| [place] Emergency    Foot Pain  2019 [facility] [place]  
                                                                                            
                    |
|  [facility] Emergency    Chest Pain    Nausea    Shortness of Breath    Chest pain,        
                                                                                            
                    |
| unspecified    Elevated blood-pressure reading, without diagnosis of hypertension         
                                                                                            
                    |
| Presence of aortocoronary bypass graft    Other specified postprocedural states  ,  
                                                                                            
                    |
|   [facility] [place] Emergency    CHEST PAIN    Abnormal levels of other  
                                                                                            
                    |
|  serum enzymes    Personal history of other diseases of the circulatory system    Chest   
                                                                                            
                    |
| pain, unspecified  2019 [facility] [place] Emergency    FISTULA        
                                                                                            
                    |
| BLEEDING    Hemorrhage due to vascular prosthetic devices, implants and grafts, initial   
 
                                                                                            
                    |
| encounter  Dec 22, 2018 [facility] [place] Emergency    CHEST PAIN    Type 2  
                                                                                            
                    |
|  diabetes mellitus with hyperglycemia    Chest pain, unspecified  2018 [facility]      
                                                                                            
                    |
| [place] Emergency  Chief Complaint: SOB  2018 [facility]       
                                                                                            
                    |
| [facility] [place] Emergency    FALL    Unspecified fall, initial encounter    Dependence   
                                                                                            
                    |
| on renal dialysis    End stage renal disease  Recent Inpatient Visit SummaryShowing 10    
                                                                                            
                    |
| most recent visits out of 11 in the past 12 monthsDate Facility City State Type           
                                                                                            
                    |
| Diagnoses or Chief Complaint 2019 [facility] [place] Vascular         
                                                                                            
                    |
| Surgery    Foot Pain    End stage renal disease    Dependence on renal dialysis           
                                                                                            
                    |
| Peripheral vascular disease, unspecified    Anemia in chronic kidney disease    Other     
                                                                                            
                    |
| disorders of plasma-protein metabolism, not elsewhere classified    Other specified       
                                                                                            
                    |
| personal risk factors, not elsewhere classified  Dec 27, 2018 [facility] [place]        
                                                                                            
                    |
| [place] Recovery    Peripheral arterial disease, osteomyelitis left foot    Other       
                                                                                            
                    |
| acute osteomyelitis, left ankle and foot    Long term (current) use of antibiotics        
                                                                                            
                    |
| End stage renal disease    Anemia in chronic kidney disease  Dec 5, 2018 [facility]  
                                                                                            
                    |
  [place] General Medicine    Peripheral vascular disease, unspecified    End       
                                                                                            
                    |
| stage renal disease    Dependence on renal dialysis    Long term (current) use of         
                                                                                            
                    |
| insulin    Other chronic osteomyelitis, left ankle and foot    Type 2 diabetes mellitus   
                                                                                            
                    |
| with diabetic chronic kidney disease    Essential (primary) hypertension  2018    
                                                                                            
                    |
| [facility] [place] Inpatient    Upper GIB    Other chronic osteomyelitis,     
                                                                                            
                    |
| unspecified ankle and foot    End stage renal disease    Other specified personal risk    
 
                                                                                            
                    |
| factors, not elsewhere classified    Chronic systolic (congestive) heart failure          
                                                                                            
                    |
| Anemia in chronic kidney disease    Other disorders of plasma-protein metabolism, not     
                                                                                            
                    |
| elsewhere classified    Moderate protein-calorie malnutrition    Other disorders of       
                                                                                            
                    |
| phosphorus metabolism  2018 [place] Medical Surgical    1.  
                                                                                            
                    |
|  Type 2 diabetes mellitus with other specified complication    2. Urinary tract           
                                                                                            
                    |
| infection, site not specified    3. Unspecified protein-calorie malnutrition    4. Other  
                                                                                            
                    |
|  chronic osteomyelitis, unspecified site    5. Hypertensive heart and chronic kidney      
                                                                                            
                    |
| disease with heart failure and with stage 5 chronic kidney disease, or end stage renal    
                                                                                            
                    |
| disease    6. Chronic diastolic (congestive) heart failure    7. Other osteomyelitis,     
                                                                                            
                    |
| ankle and foot    8. Type 2 diabetes mellitus with foot ulcer    9. Atherosclerotic       
                                                                                            
                    |
| heart disease of native coronary artery without angina pectoris    10. Chronic            
                                                                                            
                    |
| obstructive pulmonary disease, unspecified  [facility] [place] [place]     
                                                                                            
                    |
| [facility]    1. Benign paroxysmal vertigo, unspecified ear    2. End stage renal   
                                                                                            
                    |
| disease    3. Hypertensive chronic kidney disease with stage 5 chronic kidney disease or  
                                                                                            
                    |
|  end stage renal disease    4. Urinary tract infection, site not specified    5.          
                                                                                            
                    |
| Hypertensive heart and chronic kidney disease with heart failure and with stage 5         
                                                                                            
                    |
| chronic kidney disease, or end stage renal disease    6. Kidney transplant status    7.   
                                                                                            
                    |
| Unspecified systolic (congestive) heart failure    8. Syncope and collapse    9. Type 2   
                                                                                            
                    |
| diabetes mellitus with diabetic chronic kidney disease    10. Atherosclerotic heart       
                                                                                            
                    |
| disease of native coronary artery without angina pectoris  Sep 15, 2018 [facility]    
 
                                                                                            
                    |
| [place] Medical Surgical    Acute ischemic heart disease, unspecified         
                                                                                            
                    |
| Long term (current) use of insulin    Dependence on renal dialysis    End stage renal     
                                                                                            
                    |
| disease    Type 2 diabetes mellitus with diabetic chronic kidney disease    Essential     
                                                                                            
                    |
| (primary) hypertension    Anemia in chronic kidney disease  2018 [facility]            
                                                                                            
                    |
| [place] Medical Surgical    0. Cough    1. Pneumonia due to Pseudomonas   
                                                                                            
                    |
|    2. End stage renal disease    3. Hypertensive heart and chronic kidney disease with    
                                                                                            
                    |
| heart failure and with stage 5 chronic kidney disease, or end stage renal disease    4.   
                                                                                            
                    |
| Cachexia    5. Chronic obstructive pulmonary disease with acute lower respiratory         
                                                                                            
                    |
| infection    6. Type 2 diabetes mellitus with diabetic chronic kidney disease    7.       
                                                                                            
                    |
| Heart failure, unspecified    8. Hyperlipidemia, unspecified    9. Gastro-esophageal      
                                                                                            
                    |
| reflux disease without esophagitis  2018 [facility] [place] Medical     
                                                                                            
                    |
| Surgical    0. Other chest pain    1. Gastro-esophageal reflux disease without            
                                                                                            
                    |
| esophagitis    2. End stage renal disease    3. Hypertensive heart and chronic kidney     
                                                                                            
                    |
| disease with heart failure and with stage 5 chronic kidney disease, or end stage renal    
                                                                                            
                    |
| disease    4. Chronic systolic (congestive) heart failure    5. Atherosclerotic heart     
                                                                                            
                    |
| disease of native coronary artery with other forms of angina pectoris    6. Type 2        
                                                                                            
                    |
| diabetes mellitus with diabetic chronic kidney disease    7. Hyperlipidemia, unspecified  
                                                                                            
                    |
|     8. Major depressive disorder, single episode, unspecified    9. Chronic obstructive   
                                                                                            
                    |
| pulmonary disease, unspecified  Mar 6, 2018 [facility]        
                                                                                            
                    |
| Cardiology    Heart Failure    Need for iv access    Type 2 diabetes mellitus with other  
 
                                                                                            
                    |
|  specified complication    Long term (current) use of insulin    Paroxysmal atrial        
                                                                                            
                    |
| fibrillation    Anemia in chronic kidney disease    Atherosclerotic heart disease of      
                                                                                            
                    |
| native coronary artery without angina pectoris    Cardiomyopathy, unspecified    End      
                                                                                            
                    |
| stage renal disease    Other specified postprocedural states  Care ProvidersProvider [facility]  
                                                                                            
                    |
|  Type Phone Fax Service Dates HEADINGS, CARLOS ALBERTO HENDERSON. Nurse Practitioner: Family           
                                                                                            
                    |
| Current  Marco Antonio Martinez /Care Coordinator [phone]  2019 -       
                                                                                            
                    |
| Current  Marco Antonio Martinez Primary Care [phone]  2019 - Current  [facility]        
                                                                                            
                    |
| [facility] Primary Care  [phone] Current  [facility]  
                                                                                            
                    |
|  [facility] Primary Care   Current  MANOLO GONZALEZ Primary Care   Current  [employer],    
                                                                                            
                    |
| [facility] Mental Health Provider [phone] Current  [employer] Case or Care  
                                                                                            
                    |
|  Manager   Current  MIRTA Goel Case or Care Manager [phone]  
                                                                                            
                    |
|  [phone] Current  Jairo Gutierrez MD Other   Current  [employer]      
                                                                                            
                    |
| patient has registered at the [facility] Emergency Department For     
                                                                                            
                    |
| more information visit:                                                                   
                                                                                            
                    |
| [email or web address] The above    
                                                                                            
                    |
| information is provided for the sole purpose of patient treatment. Use of this            
                                                                                            
                    |
| information beyond the terms of Data Sharing Memorandum of Understanding and License      
                                                                                            
                    |
| Agreement is prohibited. In certain cases not all visits may be represented. Consult the  
                                                                                            
                    |
|  aforementioned facilities for additional information. 2019 [employer]            
                                                                                            
                    |
| [employer] [place] - [email or web address]                  
 
                                                                                            
                    |
|   End stage renal disease                                                                 
                                                                                            
                    |
|   Dependence on renal dialysis                                                            
                                                                                            
                    |
|   Long term (current) use of insulin                                                      
                                                                                            
                    |
|   Other chronic osteomyelitis, left ankle and foot                                        
                                                                                            
                    |
|   Type 2 diabetes mellitus with diabetic chronic kidney disease                           
                                                                                            
                    |
|   Essential (primary) hypertension                                                        
                                                                                            
                    |
|                                                                                           
                                                                                            
                    |
[facility] [place] [facility]                                      
                                                                                            
                    |
|  Upper GIB                                                                                
                                                                                            
                    |
|   Other chronic osteomyelitis, unspecified ankle and foot                                 
                                                                                            
                    |
|   End stage renal disease                                                                 
                                                                                            
                    |
|   Other specified personal risk factors, not elsewhere classified                         
                                                                                            
                    |
|   Chronic systolic (congestive) heart failure                                             
                                                                                            
                    |
|   Anemia in chronic kidney disease                                                        
                                                                                            
                    |
|   Other disorders of plasma-protein metabolism, not elsewhere classified                  
                                                                                            
                    |
|   Moderate protein-calorie malnutrition                                                   
                                                                                            
                    |
|   Other disorders of phosphorus metabolism                                                
                                                                                            
                    |
|                                                                                           
                                                                                            
                    |
|2018 [facility]. [place] [facility]                                
                                                                                            
                    |
|  1. Type 2 diabetes mellitus with other specified complication                            
 
                                                                                            
                    |
|   2. Urinary tract infection, site not specified                                          
                                                                                            
                    |
|   3. Unspecified protein-calorie malnutrition                                             
                                                                                            
                    |
|   4. Other chronic osteomyelitis, unspecified site                                        
                                                                                            
                    |
|   5. 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
onic kidney disease, or end stage renal disease                                             
                    |
|   6. Chronic diastolic (congestive) heart failure                                         
                                                                                            
                    |
|   7. Other osteomyelitis, ankle and foot                                                  
                                                                                            
                    |
|   8. Type 2 diabetes mellitus with foot ulcer                                             
                                                                                            
                    |
|   9. Atherosclerotic heart disease of native coronary artery without angina pectoris      
                                                                                            
                    |
|   10. Chronic obstructive pulmonary disease, unspecified                                  
                                                                                            
                    |
|                                                                                           
                                                                                            
                    |
|2018 [facility] [place] [facility]                                 
                                                                                            
                    |
|  1. Benign paroxysmal vertigo, unspecified ear                                            
                                                                                            
                    |
|   2. End stage renal disease                                                              
                                                                                            
                    |
|   3. Hypertensive chronic kidney disease with stage 5 chronic kidney disease or end stage 
renal disease                                                                               
                    |
|   4. Urinary tract infection, site not specified                                          
                                                                                            
                    |
|   5. 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
onic kidney disease, or end stage renal disease                                             
                    |
|   6. Kidney transplant status                                                             
                                                                                            
                    |
|   7. Unspecified systolic (congestive) heart failure                                      
                                                                                            
                    |
|   8. Syncope and collapse                                                                 
                                                                                            
                    |
|   9. Type 2 diabetes mellitus with diabetic chronic kidney disease                        
 
                                                                                            
                    |
|   10. Atherosclerotic heart disease of native coronary artery without angina pectoris     
                                                                                            
                    |
|                                                                                           
                                                                                            
                    |
|Sep 15, [address] [facility] [address] [place]. [place] [facility]                           
                                                                                            
                    |
|  Acute ischemic heart disease, unspecified                                                
                                                                                            
                    |
|   Long term (current) use of insulin                                                      
                                                                                            
                    |
|   Dependence on renal dialysis                                                            
                                                                                            
                    |
|   End stage renal disease                                                                 
                                                                                            
                    |
|   Type 2 diabetes mellitus with diabetic chronic kidney disease                           
                                                                                            
                    |
|   Essential (primary) hypertension                                                        
                                                                                            
                    |
|   Anemia in chronic kidney disease                                                        
                                                                                            
                    |
|                                                                                           
                                                                                            
                    |
|2018 [facility] [place] [place] [facility]                                
                                                                                            
                    |
|  0. Cough                                                                                 
                                                                                            
                    |
|   1. Pneumonia due to Pseudomonas                                                         
                                                                                            
                    |
|   2. End stage renal disease                                                              
                                                                                            
                    |
|   3. 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
onic kidney disease, or end stage renal disease                                             
                    |
|   4. Cachexia                                                                             
                                                                                            
                    |
|   5. Chronic obstructive pulmonary disease with acute lower respiratory infection         
                                                                                            
                    |
|   6. Type 2 diabetes mellitus with diabetic chronic kidney disease                        
                                                                                            
                    |
|   7. Heart failure, unspecified                                                           
 
                                                                                            
                    |
|   8. Hyperlipidemia, unspecified                                                          
                                                                                            
                    |
|   9. Gastro-esophageal reflux disease without esophagitis                                 
                                                                                            
                    |
|                                                                                           
                                                                                            
                    |
|2018 [facility] [place] [place] [facility]                                 
                                                                                            
                    |
|  0. Other chest pain                                                                      
                                                                                            
                    |
|   1. Gastro-esophageal reflux disease without esophagitis                                 
                                                                                            
                    |
|   2. End stage renal disease                                                              
                                                                                            
                    |
|   3. 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
onic kidney disease, or end stage renal disease                                             
                    |
|   4. Chronic systolic (congestive) heart failure                                          
                                                                                            
                    |
|   5. 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
ctoris                                                                                      
                    |
|   6. Type 2 diabetes mellitus with diabetic chronic kidney disease                        
                                                                                            
                    |
|   7. Hyperlipidemia, unspecified                                                          
                                                                                            
                    |
|   8. Major depressive disorder, single episode, unspecified                               
                                                                                            
                    |
|   9. Chronic obstructive pulmonary disease, unspecified                                   
                                                                                            
                    |
|                                                                                           
                                                                                            
                    |
|Mar 6, 2018 [facility]                              
                                                                                            
                    |
|  Heart Failure                                                                            
                                                                                            
                    |
|   Need for iv access                                                                      
                                                                                            
                    |
|   Type 2 diabetes mellitus with other specified complication                              
                                                                                            
                    |
|   Long term (current) use of insulin                                                      
 
                                                                                            
                    |
|   Paroxysmal atrial fibrillation                                                          
                                                                                            
                    |
|   Anemia in chronic kidney disease                                                        
                                                                                            
                    |
|   Atherosclerotic heart disease of native coronary artery without angina pectoris         
                                                                                            
                    |
|   Cardiomyopathy, unspecified                                                             
                                                                                            
                    |
|   End stage renal disease                                                                 
                                                                                            
                    |
|   Other specified postprocedural states                                                   
                                                                                            
                    |
|                                                                                           
                                                                                            
                    |
|                                                                                           
                                                                                            
                    |
|                                                                                           
                                                                                            
                    |
|Care Providers                                                                             
                                                                                            
                    |
|Provider PRC Type Phone Fax Service Dates                                                  
                                                                                            
                    |
|HEADINGS, SALEEM HENDERSONN.P. Nurse Practitioner: Family   Current                                
                                                                                            
                    |
|Marco Antonio Martinez /Care Coordinator [phone]  2019 - Current         
                                                                                            
                    |
|[facility] [phone]  2019 - Current                          
                                                                                            
                    |
|[facility]  [phone] Current                   
                                                                                            
                    |
[facility]   Current                                
                                                                                            
                    |
|[facility] Primary Care   Current                                                        
                                                                                            
                    |
|[email or web address] Provider [phone] Current                 
                                                                                            
                    |
|[facility] Case or Care Manager   Current                                                   
                                                                                            
                    |
|MIRTA Goel Case or Care Manager [phone] Current
 
                                                                                            
                    |
|Jairo Gutierrez MD Other   Current                                                       
                                                                                            
                    |
|                                                                                           
                                                                                            
                    |
|[employer] [facility]                                                                          
                                                                                            
                    |
|This patient has registered at the [facility] Emergency Department     
                                                                                            
                    |
|For more information visit: [email or web address]
[email or web address]                                                                               
                    |
|The above information is provided for the sole purpose of patient treatment. Use of this in
formation beyond the terms of Data Sharing Memorandum of Understanding and License Agreement
 is prohibited. In  |
|certain cases not all visits may be represented. Consult the aforementioned facilities for 
additional information.                                                                     
                    |
|2019 [facility] - [place], [place] - [email or web address]                                                                                       
                    |
+-------------------------------------------------------------------------------------------
--------------------------------------------------------------------------------------------
--------------------+
 
 
 
+-------------------+---------+--------------------+--------------+
| Performing        | Address | City/State/Zipcode | Phone Number |
| Organization      |         |                    |              |
+-------------------+---------+--------------------+--------------+
|   ED INFORMATION  |         |                    |              |
| EXCHANGE          |         |                    |              |
+-------------------+---------+--------------------+--------------+
 in this encounter
 
 Visit Diagnoses
 
 
+-------------------------------------------------------------------------------------+
| Diagnosis                                                                           |
+-------------------------------------------------------------------------------------+
|   Fall in home, initial encounter - Primary                                         |
+-------------------------------------------------------------------------------------+
|   Cellulitis of right toe                                                           |
+-------------------------------------------------------------------------------------+
|   Closed displaced fracture of distal phalanx of right great toe, initial encounter |
+-------------------------------------------------------------------------------------+
|   Generalized weakness                                                              |
+-------------------------------------------------------------------------------------+
|   Other malaise and fatigue                                                         |
+-------------------------------------------------------------------------------------+
|   Fatigue, unspecified type                                                         |
+-------------------------------------------------------------------------------------+
 
 
 
 
 Admitting Diagnoses
 
 
+-------------------------------------------------------------------------------------+
| Diagnosis                                                                           |
+-------------------------------------------------------------------------------------+
|   Generalized weakness                                                              |
+-------------------------------------------------------------------------------------+
|   Other malaise and fatigue                                                         |
+-------------------------------------------------------------------------------------+
|   Closed displaced fracture of distal phalanx of right great toe, initial encounter |
+-------------------------------------------------------------------------------------+
|   Fall in home, initial encounter                                                   |
+-------------------------------------------------------------------------------------+
|   Cellulitis of right toe                                                           |
+-------------------------------------------------------------------------------------+
|   Fatigue, unspecified type                                                         |
+-------------------------------------------------------------------------------------+
 
 
 
 Administered Medications
 
 
+------------------+--------+---------+------+------+------+
| Medication Order | MAR    | Action  | Dose | Rate | Site |
|                  | Action | Date    |      |      |      |
+------------------+--------+---------+------+------+------+
 
 
 
+-----------------------------------+---+
|   acetaminophen (TYLENOL)         |   |
| suppository 650 mg  650 mg,       |   |
| Rectal, Every 6 Hours PRN, Mild   |   |
| Pain (1-3), Fever, Starting Thu   |   |
| 19 at 2229                   |   |
+-----------------------------------+---+
|                                   |   |
+-----------------------------------+---+
|   acetaminophen (TYLENOL) tablet  |   |
| 650 mg  650 mg, Oral, Every 6     |   |
| Hours PRN, Mild Pain (1-3),       |   |
| Fever, Starting Thu 19 at    |   |
| 2229                              |   |
+-----------------------------------+---+
|                                   |   |
+-----------------------------------+---+
 
 
 
+-----------------------------------+-------+----------+--------+---+---+
|   apixaban (ELIQUIS) tablet 2.5   | Given |  | 2.5 mg |   |   |
| mg  2.5 mg, Oral, 2 Times Daily,  |       | 9 23:39  |        |   |   |
| First dose on Thu 19 at 2300 |       | PST      |        |   |   |
+-----------------------------------+-------+----------+--------+---+---+
 
 
 
 
+-------+----------+--------+---+---+
| Given | 2/15/201 | 2.5 mg |   |   |
|       | 9 09:40  |        |   |   |
|       | PST      |        |   |   |
+-------+----------+--------+---+---+
 
 
 
+---+---+
|   |   |
+---+---+
 
 
 
+-----------------------------------+-------+----------+-------+---+---+
|   atorvastatin (LIPITOR) tablet   | Given |  | 80 mg |   |   |
| 80 mg  80 mg, Oral, Nightly,      |       | 9 23:38  |       |   |   |
| First dose on Thu 19 at 2300 |       | PST      |       |   |   |
+-----------------------------------+-------+----------+-------+---+---+
 
 
 
+---+---+
|   |   |
+---+---+
 
 
 
+-----------------------------------+-------+----------+---------+---+---+
|   carvedilol (COREG) tablet 12.5  | Given |  | 12.5 mg |   |   |
| mg  12.5 mg, Oral, 2 Times Daily  |       | 9 23:38  |         |   |   |
| With Meals, First dose on Thu     |       | PST      |         |   |   |
| 19 at 2300                   |       |          |         |   |   |
+-----------------------------------+-------+----------+---------+---+---+
 
 
 
+-------+----------+---------+---+---+
| Given | 2/15/201 | 12.5 mg |   |   |
|       | 9 09:41  |         |   |   |
|       | PST      |         |   |   |
+-------+----------+---------+---+---+
 
 
 
+---+---+
|   |   |
+---+---+
 
 
 
+-----------------------------------+---------+----------+-----+-------+---+
|   cefTAZidime (FORTAZ) 2 g in     | New Bag |  | 2 g | 100   |   |
| sodium chloride (IV) 0.9 % 50 mL  |         | 9 18:05  |     | mL/hr |   |
| IVPB  2 g, Intravenous,           |         | PST      |     |       |   |
| Administer over 30 Minutes, Once, |         |          |     |       |   |
|  Thu 19 at 1700, For 1 dose  |         |          |     |       |   |
+-----------------------------------+---------+----------+-----+-------+---+
 
 
 
 
+-----------------------------------+---+
|                                   |   |
+-----------------------------------+---+
|   dextrose 10 % infusion  at      |   |
| 0-100 mL/hr, Intravenous,         |   |
| Continuous PRN, Hypoglycemia,     |   |
| Starting Thu 19 at , For |   |
|  BG 70 or less run infusion at    |   |
| 100 mL/ hr. Discontinue when BG   |   |
| increases to 100 mg/dl or greater |   |
|  x 2-3 hours and patient is       |   |
| eating. Call provider if BG not   |   |
| maintained after 2-3 hours.       |   |
+-----------------------------------+---+
|                                   |   |
+-----------------------------------+---+
|   dextrose 50 % solution 12 mL    |   |
| 12 mL, Intravenous, PRN,          |   |
| Hypoglycemia (BG < 70 mg/dL),     |   |
| Starting Thu 19 at       |   |
+-----------------------------------+---+
|                                   |   |
+-----------------------------------+---+
|   dextrose 50 % solution 25 mL    |   |
| 25 mL, Intravenous, PRN,          |   |
| Hypoglycemia (BG < 70 mg/dL),     |   |
| Starting Thu 19 at       |   |
+-----------------------------------+---+
|                                   |   |
+-----------------------------------+---+
|   glucagon (GLUCAGEN) injection   |   |
| 0.5 mg  0.5 mg, Intramuscular,    |   |
| PRN, Hypoglycemia, (BG < 70       |   |
| mg/dL), Starting Thu 19 at   |   |
|                               |   |
+-----------------------------------+---+
|                                   |   |
+-----------------------------------+---+
|   glucagon (GLUCAGEN) injection 1 |   |
|  mg  1 mg, Intramuscular, PRN,    |   |
| Hypoglycemia, (BG < 70 mg/dL),    |   |
| Starting u 19 at       |   |
+-----------------------------------+---+
|                                   |   |
+-----------------------------------+---+
 
 
 
+-----------------------------------+-------+----------+----------+---+---+
|   HYDROcodone-acetaminophen       | Given |  | 1 tablet |   |   |
| (NORCO) 5-325 MG per tablet 1     |       | 9 17:51  |          |   |   |
| tablet  1 tablet, Oral, Once, Thu |       | PST      |          |   |   |
|  19 at 1740, For 1 dose      |       |          |          |   |   |
+-----------------------------------+-------+----------+----------+---+---+
 
 
 
+---+---+
 
|   |   |
+---+---+
 
 
 
+-----------------------------------+-------+----------+----------+---+---+
|   HYDROcodone-acetaminophen       | Given |  | 1 tablet |   |   |
| (NORCO) 5-325 MG per tablet 1     |       | 9 23:39  |          |   |   |
| tablet  1 tablet, Oral, Every 4   |       | PST      |          |   |   |
| Hours PRN, Moderate Pain (4-6),   |       |          |          |   |   |
| Severe Pain (7-10), Starting Thu  |       |          |          |   |   |
| 19 at                    |       |          |          |   |   |
+-----------------------------------+-------+----------+----------+---+---+
 
 
 
+-------+----------+----------+---+---+
| Given | 2/15/201 | 1 tablet |   |   |
|       | 9 04:30  |          |   |   |
|       | PST      |          |   |   |
+-------+----------+----------+---+---+
| Given | 2/15/201 | 1 tablet |   |   |
|       | 9 09:39  |          |   |   |
|       | PST      |          |   |   |
+-------+----------+----------+---+---+
 
 
 
+---+---+
|   |   |
+---+---+
 
 
 
+-----------------------------------+-------+----------+----------+---+---+
|   insulin glargine (LANTUS)       | Given | 2/15/201 | 21 Units |   |   |
| injection 21 Units  21 Units,     |       | 9 00:29  |          |   |   |
| Subcutaneous, Nightly, First dose |       | PST      |          |   |   |
|  on Fri 2/15/19 at 0000           |       |          |          |   |   |
+-----------------------------------+-------+----------+----------+---+---+
 
 
 
+---+---+
|   |   |
+---+---+
 
 
 
+-----------------------------------+-------+----------+---------+---+---+
|   insulin lispro (human)          | Given |  | 3 Units |   |   |
| (HUMALOG) injection 0-10 Units    |       | 9 23:36  |         |   |   |
| 0-10 Units, Subcutaneous, 3 Times |       | PST      |         |   |   |
|  Daily Before Meals, First dose   |       |          |         |   |   |
| on 19 at 2300            |       |          |         |   |   |
+-----------------------------------+-------+----------+---------+---+---+
 
 
 
+-------+----------+---------+---+---+
 
| Given | 2/15/201 | 3 Units |   |   |
|       | 9 12:25  |         |   |   |
|       | PST      |         |   |   |
+-------+----------+---------+---+---+
 
 
 
+---+---+
|   |   |
+---+---+
 
 
 
+-----------------------------------+-------+----------+---------+---+---+
|   insulin lispro (human)          | Given |  | 1 Units |   |   |
| (HUMALOG) injection 0-5 Units     |       | 9 23:36  |         |   |   |
| 0-5 Units, Subcutaneous, Nightly, |       | PST      |         |   |   |
|  First dose on 19 at     |       |          |         |   |   |
| 2300                              |       |          |         |   |   |
+-----------------------------------+-------+----------+---------+---+---+
 
 
 
+---+---+
|   |   |
+---+---+
 
 
 
+-----------------------------------+-------+----------+-------+---+---+
|   isosorbide mononitrate 24 hr    | Given | 2/15/201 | 60 mg |   |   |
| tablet 60 mg  60 mg, Oral, Daily, |       | 9 09:40  |       |   |   |
|  First dose on Fri 2/15/19 at     |       | PST      |       |   |   |
| 0900                              |       |          |       |   |   |
+-----------------------------------+-------+----------+-------+---+---+
 
 
 
+---+---+
|   |   |
+---+---+
 
 
 
+-----------------------------------+-------+----------+------+---+---+
|   LORazepam (ATIVAN) tablet 1 mg  | Given | 2/15/201 | 1 mg |   |   |
|  1 mg, Oral, Every 8 Hours PRN,   |       | 9 13:33  |      |   |   |
| Anxiety, Starting Fri 2/15/19 at  |       | PST      |      |   |   |
| 1247                              |       |          |      |   |   |
+-----------------------------------+-------+----------+------+---+---+
 
 
 
+---+---+
|   |   |
+---+---+
 
 
 
+-----------------------------------+-------+----------+--------+---+---+
 
|   losartan (COZAAR) tablet 100 mg | Given | 2/15/201 | 100 mg |   |   |
|   100 mg, Oral, Daily, First dose |       | 9 09:41  |        |   |   |
|  on Fri 2/15/19 at 0900           |       | PST      |        |   |   |
+-----------------------------------+-------+----------+--------+---+---+
 
 
 
+---+---+
|   |   |
+---+---+
 
 
 
+-----------------------------------+-------+----------+-------+---+---+
|   nortriptyline (PAMELOR) capsule | Given | 2/15/201 | 25 mg |   |   |
|  25 mg  25 mg, Oral, Every        |       | 9 09:39  |       |   |   |
| Morning, First dose on Fri        |       | PST      |       |   |   |
| 2/15/19 at 0900                   |       |          |       |   |   |
+-----------------------------------+-------+----------+-------+---+---+
 
 
 
+---+---+
|   |   |
+---+---+
 
 
 
+-----------------------------------+-------+----------+-------+---+---+
|   nortriptyline (PAMELOR) capsule | Given | 2/15/201 | 75 mg |   |   |
|  75 mg  75 mg, Oral, Nightly,     |       | 9 00:30  |       |   |   |
| First dose on Fri 2/15/19 at 0000 |       | PST      |       |   |   |
+-----------------------------------+-------+----------+-------+---+---+
 
 
 
+-----------------------------------+---+
|                                   |   |
+-----------------------------------+---+
|   ondansetron (ZOFRAN) injection  |   |
| 4 mg  4 mg, Intravenous, Every 6  |   |
| Hours PRN, Nausea, Vomiting,      |   |
| Starting Thu 19 at 2229      |   |
+-----------------------------------+---+
|                                   |   |
+-----------------------------------+---+
|   ondansetron (ZOFRAN-ODT)        |   |
| disintegrating tablet 4 mg  4 mg, |   |
|  Oral, Every 6 Hours PRN, Nausea, |   |
|  Vomiting, Starting Thu 19   |   |
| at 2229                           |   |
+-----------------------------------+---+
|                                   |   |
+-----------------------------------+---+
 
 
 
+-----------------------------------+-------+----------+--------+---+---+
|   oxybutynin (DITROPAN) tablet    | Given |  | 7.5 mg |   |   |
| 7.5 mg  7.5 mg, Oral, 2 Times     |       | 9 23:38  |        |   |   |
 
| Daily, First dose on Thu 19  |       | PST      |        |   |   |
| at 2300                           |       |          |        |   |   |
+-----------------------------------+-------+----------+--------+---+---+
 
 
 
+-------+----------+--------+---+---+
| Given | 2/15/201 | 7.5 mg |   |   |
|       | 9 09:39  |        |   |   |
|       | PST      |        |   |   |
+-------+----------+--------+---+---+
 
 
 
+---+---+
|   |   |
+---+---+
 
 
 
+-----------------------------------+-------+----------+-------+---+---+
|   pantoprazole (PROTONIX) EC      | Given | 2/15/201 | 40 mg |   |   |
| tablet 40 mg  40 mg, Oral, Every  |       | 9 06:25  |       |   |   |
| Morning Before Breakfast, First   |       | PST      |       |   |   |
| dose on Fri 2/15/19 at 0630       |       |          |       |   |   |
+-----------------------------------+-------+----------+-------+---+---+
 
 
 
+---+---+
|   |   |
+---+---+
 
 
 
+-----------------------------------+-------+----------+---------+---+---+
|   rOPINIRole (REQUIP) tablet 0.75 | Given |  | 0.75 mg |   |   |
|  mg  0.75 mg, Oral, Nightly,      |       | 9 23:39  |         |   |   |
| First dose on u 19 at 2200 |       | PST      |         |   |   |
+-----------------------------------+-------+----------+---------+---+---+
 
 
 
+---+---+
|   |   |
+---+---+
 
 
 
+-----------------------------------+-------+----------+--------+---+---+
|   sodium chloride (PF) 0.9 %      | Given |  | 10 mLs |   |   |
| flush 10 mL  10 mL, Intravenous,  |       | 9 23:39  |        |   |   |
| Every 8 Hours, First dose on Thu  |       | PST      |        |   |   |
| 19 at 2300                   |       |          |        |   |   |
+-----------------------------------+-------+----------+--------+---+---+
 
 
 
+-------+----------+--------+---+---+
| Given | 2/15/201 | 10 mLs |   |   |
 
|       | 9 06:26  |        |   |   |
|       | PST      |        |   |   |
+-------+----------+--------+---+---+
 
 
 
+---+---+
|   |   |
+---+---+
 
 
 
+----------------------------------+---------+----------+----------+---+---+
|   vancomycin (VANCOCIN) 1500     | New Bag |  | 1,500 mg |   |   |
| mg/250 mL IVPB  1,500 mg,        |         | 9 18:41  |          |   |   |
| Intravenous, Administer over 90  |         | PST      |          |   |   |
| Minutes, Once, Thu 19 at    |         |          |          |   |   |
| 1700, For 1 dose                 |         |          |          |   |   |
+----------------------------------+---------+----------+----------+---+---+
 
 
 
+---+---+
|   |   |
+---+---+
 in this encounter

## 2019-04-03 NOTE — XMS
Encounter Summary
  Created on: 2019
 
 Cherie Villalobos
 External Reference #: [id number]
 : 48
 Sex: Female
 
 Demographics
 
 
+-----------------------+--------------------------+
| Address               | [address]               |
|                       | [place]  [address] |
+-----------------------+--------------------------+
| Home Phone            | [phone]          |
+-----------------------+--------------------------+
| Preferred Language    | Unknown                  |
+-----------------------+--------------------------+
| Marital Status        |                   |
+-----------------------+--------------------------+
| Yazidi Affiliation | 1013                     |
+-----------------------+--------------------------+
| Race                  | Unknown                  |
+-----------------------+--------------------------+
| Ethnic Group          | Unknown                  |
+-----------------------+--------------------------+
 
 
 Author
 
 
+--------------+-----------------------+
| Author       | [employer] |
+--------------+-----------------------+
| Organization | [facility] |
+--------------+-----------------------+
| Address      | Unknown               |
+--------------+-----------------------+
| Phone        | Unavailable           |
+--------------+-----------------------+
 
 
 
 Support
 
 
+---------------+--------------+---------------------+-----------------+
| Name          | Relationship | Address             | Phone           |
+---------------+--------------+---------------------+-----------------+
| Anoop Villalobos | ECON         | [place], | [phone] |
|               |              |  [place]  [id number]     |                 |
+---------------+--------------+---------------------+-----------------+
| Jennifer Dickson | ECON         | [place]       | [phone] |
|               |              | [id number]               |                 |
+---------------+--------------+---------------------+-----------------+
 
 
 
 
 Care Team Providers
 
 
+-----------------------+------+-----------------+
| Care Team Member Name | Role | Phone           |
+-----------------------+------+-----------------+
| Ivy Couch DO      | PCP  | [phone] |
+-----------------------+------+-----------------+
 
 
 
 Reason for Visit
 
 
+--------+---------------------------------------------------+
| Reason | Comments                                          |
+--------+---------------------------------------------------+
| Other  | Diagnosis request sent to [facility] |
+--------+---------------------------------------------------+
 
 
 
 Encounter Details
 
 
+--------+-------------+----------------------+---------------+-------------------+
| Date   | Type        | Department           | Care Team     | Description       |
+--------+-------------+----------------------+---------------+-------------------+
| 01/15/ | Documentati |   [facility]      |   Rl,  | Other (Diagnosis  |
| 2019   | on Only     | [place]  [address]    | [place] [address]  | request sent to   |
|        |             | [address]    |               | [facility]        |
|        |             | [place] [address]  |               | confirmation)     |
|        |             |  [phone]        |               |                   |
+--------+-------------+----------------------+---------------+-------------------+
 
 
 
 Social History
 
 
+---------------+------------+-----------+--------+------------------+
| Tobacco Use   | Types      | Packs/Day | Years  | Date             |
|               |            |           | Used   |                  |
+---------------+------------+-----------+--------+------------------+
| Former Smoker | Cigarettes | 1         | 30     | Quit: 2004 |
+---------------+------------+-----------+--------+------------------+
 
 
 
+---------------------+---+---+---+
| Smokeless Tobacco:  |   |   |   |
| Never Used          |   |   |   |
+---------------------+---+---+---+
 
 
 
+------------------------------+
| Comments: quite smoking  |
+------------------------------+
 
 
 
 
+-------------+-----------+---------+----------+
| Alcohol Use | Drinks/We | oz/Week | Comments |
|             | ek        |         |          |
+-------------+-----------+---------+----------+
| No          |           |         |          |
+-------------+-----------+---------+----------+
 
 
 
+------------------+---------------+
| Sex Assigned at  | Date Recorded |
| Birth            |               |
+------------------+---------------+
| Not on file      |               |
+------------------+---------------+
 as of this encounter
 
 Plan of Treatment
 
 
+--------+---------+-----------+----------------------+-------------+
| Date   | Type    | Specialty | Care Team            | Description |
+--------+---------+-----------+----------------------+-------------+
| 04/10/ | Office  | Podiatry  |   Srinivasan Jordan,  |             |
|    | Visit   |           | DPM  [address], |             |
|        |         |           |  [address]  [place],  |             |
|        |         |           | [place] [address]             |             |
|        |         |           | [phone]         |             |
|        |         |           | [phone] (Fax)   |             |
+--------+---------+-----------+----------------------+-------------+
 as of this encounter
 
 Visit Diagnoses
 Not on filein this encounter

## 2019-04-03 NOTE — XMS
Encounter Summary
  Created on: 2019
 
 Cherie Villalobos
 External Reference #: [id number]
 : 48
 Sex: Female
 
 Demographics
 
 
+-----------------------+--------------------------+
| Address               | [address]               |
|                       | [place]  [address] |
+-----------------------+--------------------------+
| Home Phone            | [phone]          |
+-----------------------+--------------------------+
| Preferred Language    | Unknown                  |
+-----------------------+--------------------------+
| Marital Status        |                   |
+-----------------------+--------------------------+
| Confucianist Affiliation | [id number]                     |
+-----------------------+--------------------------+
| Race                  | Unknown                  |
+-----------------------+--------------------------+
| Ethnic Group          | Unknown                  |
+-----------------------+--------------------------+
 
 
 Author
 
 
+--------------+-----------------------+
| Author       | Sherry Trusteer |
+--------------+-----------------------+
| Organization | [facility] |
+--------------+-----------------------+
| Address      | Unknown               |
+--------------+-----------------------+
| Phone        | Unavailable           |
+--------------+-----------------------+
 
 
 
 Support
 
 
+---------------+--------------+---------------------+-----------------+
| Name          | Relationship | Address             | Phone           |
+---------------+--------------+---------------------+-----------------+
| Anoop Villalobos | ECON         | [place], | [phone] |
|               |              |  [place]  [address]     |                 |
+---------------+--------------+---------------------+-----------------+
| Jennifer Dickson | ECON         | [place]       | [phone] |
|               |              | [address]               |                 |
+---------------+--------------+---------------------+-----------------+
 
 
 
 
 Care Team Providers
 
 
+-----------------------+------+-----------------+
| Care Team Member Name | Role | Phone           |
+-----------------------+------+-----------------+
| Ivy Couch DO      | PCP  | [phone] |
+-----------------------+------+-----------------+
 
 
 
 Encounter Details
 
 
+--------+-------------+----------------------+---------------------+-------------+
| Date   | Type        | Department           | Care Team           | Description |
+--------+-------------+----------------------+---------------------+-------------+
| / | Orders Only |   [facility] |   Luisa Segovia  |             |
| 2019   |             |  and Ankle  [address]      | [address]               |             |
|        |             | [address]   |                     |             |
|        |             | [place]         |                     |             |
|        |             | [address]           |                     |             |
|        |             | [phone]         |                     |             |
+--------+-------------+----------------------+---------------------+-------------+
 
 
 
 Social History
 
 
+---------------+------------+-----------+--------+------------------+
| Tobacco Use   | Types      | Packs/Day | Years  | Date             |
|               |            |           | Used   |                  |
+---------------+------------+-----------+--------+------------------+
| Former Smoker | Cigarettes | 1         | 30     | Quit: 2004 |
+---------------+------------+-----------+--------+------------------+
 
 
 
+---------------------+---+---+---+
| Smokeless Tobacco:  |   |   |   |
| Never Used          |   |   |   |
+---------------------+---+---+---+
 
 
 
+------------------------------+
| Comments: quite smoking 2004 |
+------------------------------+
 
 
 
+-------------+-----------+---------+----------+
| Alcohol Use | Drinks/We | oz/Week | Comments |
|             | ek        |         |          |
+-------------+-----------+---------+----------+
| No          |           |         |          |
+-------------+-----------+---------+----------+
 
 
 
 
+------------------+---------------+
| Sex Assigned at  | Date Recorded |
| Birth            |               |
+------------------+---------------+
| Not on file      |               |
+------------------+---------------+
 as of this encounter
 
 Plan of Treatment
 
 
+--------+---------+-----------+----------------------+-------------+
| Date   | Type    | Specialty | Care Team            | Description |
+--------+---------+-----------+----------------------+-------------+
| 04/10/ | Office  | Podiatry  |   Srinivasan Jordan,  |             |
| 2019   | Visit   |           | DPM  [address], |             |
|        |         |           |  [address]  [place],  |             |
|        |         |           | [place] [address]             |             |
|        |         |           | [phone]         |             |
|        |         |           | [phone] (Fax)   |             |
+--------+---------+-----------+----------------------+-------------+
 as of this encounter
 
 Visit Diagnoses
 Not on filein this encounter

## 2019-04-03 NOTE — XMS
Encounter Summary
  Created on: 2019
 
 Cherie Villalobos
 External Reference #: [id number]
 : 48
 Sex: Female
 
 Demographics
 
 
+-----------------------+--------------------------+
| Address               | [address]               |
|                       | [place]  [address] |
+-----------------------+--------------------------+
| Home Phone            | [phone]          |
+-----------------------+--------------------------+
| Preferred Language    | Unknown                  |
+-----------------------+--------------------------+
| Marital Status        |                   |
+-----------------------+--------------------------+
| Mu-ism Affiliation | 1013                     |
+-----------------------+--------------------------+
| Race                  | Unknown                  |
+-----------------------+--------------------------+
| Ethnic Group          | Unknown                  |
+-----------------------+--------------------------+
 
 
 Author
 
 
+--------------+-----------------------+
| Author       | Sherry Micronotes |
+--------------+-----------------------+
| Organization | [facility] |
+--------------+-----------------------+
| Address      | Unknown               |
+--------------+-----------------------+
| Phone        | Unavailable           |
+--------------+-----------------------+
 
 
 
 Support
 
 
+---------------+--------------+---------------------+-----------------+
| Name          | Relationship | Address             | Phone           |
+---------------+--------------+---------------------+-----------------+
| Anoop Villalobos | ECON         | [place], | [phone] |
|               |              |  [place]  [address]     |                 |
+---------------+--------------+---------------------+-----------------+
| Jennifer Dickson | ECON         | [place]       | [phone] |
|               |              | [address]               |                 |
+---------------+--------------+---------------------+-----------------+
 
 
 
 
 Care Team Providers
 
 
+-----------------------+------+-----------------+
| Care Team Member Name | Role | Phone           |
+-----------------------+------+-----------------+
| Ivy Couch DO      | PCP  | [phone] |
+-----------------------+------+-----------------+
 
 
 
 Reason for Visit
 
 
+-----------+-------------------------------------------------------------------+
| Reason    | Comments                                                          |
+-----------+-------------------------------------------------------------------+
| Follow-up | Patient presents today for her 1st post op amputation of the left |
|           |  toes. Patient states she is doing well, she states she is ready  |
|           | to go home from the care facility.                                |
+-----------+-------------------------------------------------------------------+
 
 
 
 Encounter Details
 
 
+--------+---------+----------------------+----------------------+----------------------+
| Date   | Type    | Department           | Care Team            | Description          |
+--------+---------+----------------------+----------------------+----------------------+
| 01/10/ | Office  |   [facility] |   Srinivasan Jordan,  | S/P amputation of    |
| 2019   | Visit   |  [address] Ankle  [address]      | DPM  [address], | foot, left (HCC)     |
|        |         | [address]   |  [address]  [place],  | (Primary Dx); Type 2 |
|        |         | [place], [place]         | [place] [address]             |  diabetes mellitus   |
|        |         | [address]           | [phone]         | with chronic kidney  |
|        |         | [phone]         | [phone] (Fax)   | disease on chronic   |
|        |         |                      |                      | dialysis, with       |
|        |         |                      |                      | long-term current    |
|        |         |                      |                      | use of insulin (HCC) |
+--------+---------+----------------------+----------------------+----------------------+
 
 
 
 Social History
 
 
+---------------+------------+-----------+--------+------------------+
| Tobacco Use   | Types      | Packs/Day | Years  | Date             |
|               |            |           | Used   |                  |
+---------------+------------+-----------+--------+------------------+
| Former Smoker | Cigarettes | 1         | 30     | Quit: 2004 |
+---------------+------------+-----------+--------+------------------+
 
 
 
+---------------------+---+---+---+
| Smokeless Tobacco:  |   |   |   |
| Never Used          |   |   |   |
+---------------------+---+---+---+
 
 
 
 
+------------------------------+
| Comments: quite smoking  |
+------------------------------+
 
 
 
+-------------+-----------+---------+----------+
| Alcohol Use | Drinks/We | oz/Week | Comments |
|             | ek        |         |          |
+-------------+-----------+---------+----------+
| No          |           |         |          |
+-------------+-----------+---------+----------+
 
 
 
+------------------+---------------+
| Sex Assigned at  | Date Recorded |
| Birth            |               |
+------------------+---------------+
| Not on file      |               |
+------------------+---------------+
 as of this encounter
 
 Last Filed Vital Signs
 
 
+-------------------+---------+-------------------------+
| Vital Sign        | Reading | Time Taken              |
+-------------------+---------+-------------------------+
| Blood Pressure    | 115/54  | 01/10/2019  1:30 PM PST |
+-------------------+---------+-------------------------+
| Pulse             | 59      | 01/10/2019  1:30 PM PST |
+-------------------+---------+-------------------------+
| Temperature       | -       | -                       |
+-------------------+---------+-------------------------+
| Respiratory Rate  | 16      | 01/10/2019  1:30 PM PST |
+-------------------+---------+-------------------------+
| Oxygen Saturation | 93%     | 01/10/2019  1:30 PM PST |
+-------------------+---------+-------------------------+
| Inhaled Oxygen    | -       | -                       |
| Concentration     |         |                         |
+-------------------+---------+-------------------------+
| Weight            | -       | -                       |
+-------------------+---------+-------------------------+
| Height            | -       | -                       |
+-------------------+---------+-------------------------+
| Body Mass Index   | -       | -                       |
+-------------------+---------+-------------------------+
 in this encounter
 
 Progress Notes
 Srinivasan Jordan, DPM - 01/10/2019  1:30 PM PSTFormatting of this note may be different fro
m the original.
  
 Subjective: 
  Patient ID: Cherie Villalobos is a 68 y.o. female.
 Chief Complaint 
 
 Patient presents with 
   Follow-up 
   Patient presents today for her 1st post op amputation of the left toes. Patient states sh
e is doing well, she states she is ready to go home from the care facility.  
  
 
 HPI
 Patient returns today status post TMA left on 18.  Patient's pain is mild well contro
lled.  She think she is ready to return home, but does not have a plan for maintaining nonwe
ightbearing status at home. 
 
 The following portions of the patient's history were reviewed and updated as appropriate an
d is available elsewhere in the record: allergies, current medications, past family history,
 past medical history, past social history, past surgical history and problem list.
 
 ROS
 Denies any nausea, vomiting, fever, chills, shortness of breath, chest pain, or calf pain 
 
     
 Objective: 
 /54 (BP Location: Right upper arm, Patient Position: Sitting)  | Pulse 59  | Resp 16 
 | SpO2 93% 
 General observation:
 Constitutional: The patient is alert and oriented to person, place and time
 Psychiatric: The patient has normal affect and mood; her speech is normal; her behavior is 
normal.
 Respiratory: Effort normal and breath sounds normal. No accessory muscle usage. No respirat
ory distress.   
 
 Lower Extremity Examination:
 Neurovascular status is grossly intact. CFT is immediate to distal foot/toes. No dysvascula
r changes. 
 The incision(s) is(are) well coapted, without evidence of dehiscence - it(they) is(are) radha
an, dry and intact.  No evidence of dehiscence.
 There is no evidence of erythema, cellulitis, lymphangitis, drainage, malodor, or signs of 
infection. Normal skin temperature is appreciated.
 No skin mottling. No hyperesthesias or paresthesias.
 No calf or thigh pain. Negative Homans sign. 
 
 Radiographs: Normal post op appearance.  See epic chart for complete read
 
 X-ray Foot Left
 
 Result Date: 2018
 Amputation of the left forefoot at the level of the proximal metatarsals. Surgical cutaneou
s staples are present. No radiographic evidence for osteomyelitis. Normal alignment of the h
indfoot. Mild degeneration of the midfoot. Electronically signed by Oleksandr Lemus MD on 2018 10:12 AM
 
 Ct Head Without Contrast
 
 Result Date: 2018
 1.  No acute intracranial pathology. Electronically signed by Steven Samano MD on  5:28 PM
 
 X-ray Chest 1 View
 
 Result Date: 2019
 1.  Small loculated pleural fluid collection seen overlying the lateral left heart border i
n the lower left chest.  There may also be a small pleural effusion at the right lung base w
 
hich is layering posteriorly. 2.  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 Electronically s
igned by Eugenio Hoffman DO on 2019 4:59 PM
 
 Angioplasty 18:
 1. Aorta with narrow aortic bifurcation with moderate stenosis of proximal left common errol
c artery. 
 2. Left SFA with moderate stenosis proximally. 
 3. Single vessel runoff to left foot via left anterior tibial artery. 
 4. Left posterior tibial artery and peroneal artery were occluded with reconstitution of di
stal posterior tibial artery at the left ankle via collaterals. 
 5. If forefoot amputation does not heal, may need popliteal to posterior tibial artery bypa
ss versus antegrade access of left common femoral artery with posterior tibial artery recana
lization. 
  
 Electronically signed by Kirt Mclaughlin MD on 2018 10:51 AM 
 
    
 Assessment and Plan: 
 S/p: TMA left on 18
 Post op day/week: 2 week
 
 Radiographs taken, read and reviewed of involved foot/ankle and findings were discussed wit
h the patient
 Dressing change/applied today with dry sterile, mildly compressive dressing
 Weight bearing status: Strict nonweightbearing
 Patient needs to remain at care facility to maintain nonweightbearing status
 Weight with removable cast boot, keep on at all times
 Follow up in 2 weeks for staple removal and recheck
 Continue antibiotic treatment at the direction of the infectious disease service (Dr. Elliott)
 
 Srinivasan Jordan DPM
  in this encounter
 
 Plan of Treatment
 
 
+--------+---------+-----------+----------------------+-------------+
| Date   | Type    | Specialty | Care Team            | Description |
+--------+---------+-----------+----------------------+-------------+
| 04/10/ | Office  | Podiatry  |   Srinivasan Jordan,  |             |
|    | Visit   |           | DPM  [address], |             |
|        |         |           |  [address]  [place],  |             |
|        |         |           | [place] [address]             |             |
|        |         |           | [phone]         |             |
|        |         |           | [phone] (Fax)   |             |
+--------+---------+-----------+----------------------+-------------+
 as of this encounter
 
 Visit Diagnoses
 
 
+-----------------------------------------------------------------------------------+
| Diagnosis                                                                         |
+-----------------------------------------------------------------------------------+
|   S/P amputation of foot, left (HCC) - Primary                                    |
+-----------------------------------------------------------------------------------+
|   Type 2 diabetes mellitus with chronic kidney disease on chronic dialysis, with  |
| long-term current use of insulin (HCC)                                            |
+-----------------------------------------------------------------------------------+

## 2019-04-03 NOTE — XMS
Encounter Summary
  Created on: 2019
 
 Cherie Villalobos
 External Reference #: [id number]
 : 48
 Sex: Female
 
 Demographics
 
 
+-----------------------+--------------------------+
| Address               | [address]               |
|                       | [place]  [address] |
+-----------------------+--------------------------+
| Home Phone            | [phone]          |
+-----------------------+--------------------------+
| Preferred Language    | Unknown                  |
+-----------------------+--------------------------+
| Marital Status        |                   |
+-----------------------+--------------------------+
| Oriental orthodox Affiliation | 1013                     |
+-----------------------+--------------------------+
| Race                  | Unknown                  |
+-----------------------+--------------------------+
| Ethnic Group          | Unknown                  |
+-----------------------+--------------------------+
 
 
 Author
 
 
+--------------+-----------------------+
| Author       | Sherry Lagotek |
+--------------+-----------------------+
| Organization | [facility] |
+--------------+-----------------------+
| Address      | Unknown               |
+--------------+-----------------------+
| Phone        | Unavailable           |
+--------------+-----------------------+
 
 
 
 Support
 
 
+---------------+--------------+---------------------+-----------------+
| Name          | Relationship | Address             | Phone           |
+---------------+--------------+---------------------+-----------------+
| Anoop Villalobos | ECON         | [place], | [phone] |
|               |              |  [place]  [address]     |                 |
+---------------+--------------+---------------------+-----------------+
| Jennifer Dickson | ECON         | [place]       | [phone] |
|               |              | [address]               |                 |
+---------------+--------------+---------------------+-----------------+
 
 
 
 
 Care Team Providers
 
 
+-----------------------+------+-----------------+
| Care Team Member Name | Role | Phone           |
+-----------------------+------+-----------------+
| Ivy Couch DO      | PCP  | [phone] |
+-----------------------+------+-----------------+
 
 
 
 Encounter Details
 
 
+--------+-----------+----------------------+----------------------+-------------+
| Date   | Type      | Department           | Care Team            | Description |
+--------+-----------+----------------------+----------------------+-------------+
| / | Telephone |   [facility]      |   Kristen Tam, |             |
| 2019   |           | Vascular Surgery     |  RN                  |             |
|        |           | [address] |                      |             |
|        |           |  E  [place]     |                      |             |
|        |           | [address]           |                      |             |
|        |           | [phone]         |                      |             |
+--------+-----------+----------------------+----------------------+-------------+
 
 
 
 Social History
 
 
+---------------+------------+-----------+--------+------------------+
| Tobacco Use   | Types      | Packs/Day | Years  | Date             |
|               |            |           | Used   |                  |
+---------------+------------+-----------+--------+------------------+
| Former Smoker | Cigarettes | 1         | 30     | Quit: 2004 |
+---------------+------------+-----------+--------+------------------+
 
 
 
+---------------------+---+---+---+
| Smokeless Tobacco:  |   |   |   |
| Never Used          |   |   |   |
+---------------------+---+---+---+
 
 
 
+------------------------------+
| Comments: quite smoking 2004 |
+------------------------------+
 
 
 
+-------------+-----------+---------+----------+
| Alcohol Use | Drinks/We | oz/Week | Comments |
|             | ek        |         |          |
+-------------+-----------+---------+----------+
| No          |           |         |          |
+-------------+-----------+---------+----------+
 
 
 
 
+------------------+---------------+
| Sex Assigned at  | Date Recorded |
| Birth            |               |
+------------------+---------------+
| Not on file      |               |
+------------------+---------------+
 as of this encounter
 
 Plan of Treatment
 
 
+--------+---------+-----------+----------------------+-------------+
| Date   | Type    | Specialty | Care Team            | Description |
+--------+---------+-----------+----------------------+-------------+
| 04/10/ | Office  | Podiatry  |   Srinivasan Jordan,  |             |
| 2019   | Visit   |           | DPM  [address], |             |
|        |         |           |  [address]  [facility]  |             |
|        |         |           | [place] [address]             |             |
|        |         |           | [phone]         |             |
|        |         |           | [phone] (Fax)   |             |
+--------+---------+-----------+----------------------+-------------+
 as of this encounter
 
 Visit Diagnoses
 Not on filein this encounter

## 2019-04-03 NOTE — XMS
Clinical Summary
  Created on: 2019
 
 Cherie oJ
 External Reference #: [id number]
 : 48
 Sex: Female
 
 Demographics
 
 
+-----------------------+--------------------------+
| Address               | [address]               |
|                       | [place]  [address] |
+-----------------------+--------------------------+
| Home Phone            | [phone]          |
+-----------------------+--------------------------+
| Preferred Language    | Unknown                  |
+-----------------------+--------------------------+
| Marital Status        |                   |
+-----------------------+--------------------------+
| Moravian Affiliation | 1013                     |
+-----------------------+--------------------------+
| Race                  | Unknown                  |
+-----------------------+--------------------------+
| Ethnic Group          | Unknown                  |
+-----------------------+--------------------------+
 
 
 Author
 
 
+--------------+--------------------------------------------+
| Author       | [facility]  |
|              | [facility] [place]                                |
+--------------+--------------------------------------------+
| Organization | [facility]  |
|              | [facility] [place]                                |
+--------------+--------------------------------------------+
| Address      | Unknown                                    |
+--------------+--------------------------------------------+
| Phone        | Unavailable                                |
+--------------+--------------------------------------------+
 
 
 
 Support
 
 
+---------------+--------------+---------------------+-----------------+
| Name          | Relationship | Address             | Phone           |
+---------------+--------------+---------------------+-----------------+
| Anoop Jo | ECON         | [address] [place], | [phone] |
|               |              |  [place]  [address]     |                 |
+---------------+--------------+---------------------+-----------------+
| Jennifer Dickson | ECON         | [place], [place]       | [phone] |
|               |              | [id number]               |                 |
+---------------+--------------+---------------------+-----------------+
 
 
 
 
 Care Team Providers
 
 
+--------------------------+------+-----------------+
| Care Team Member Name    | Role | Phone           |
+--------------------------+------+-----------------+
| Araseli Montelongo [employer]  | PP   | [phone] |
| Professional             |      |                 |
+--------------------------+------+-----------------+
 
 
 
 Allergies
 
 
+---------------------+----------------------+----------+----------+----------------------+
| Active Allergy      | Reactions            | Severity | Noted    | Comments             |
|                     |                      |          | Date     |                      |
+---------------------+----------------------+----------+----------+----------------------+
| Digoxin And Related | Other (See Comments) | High     | 20 |   Toxic, patient     |
|                     |                      |          | 18       | states her eyes were |
|                     |                      |          |          |  also affected "she  |
|                     |                      |          |          | seen yellow          |
|                     |                      |          |          | everywhere"          |
+---------------------+----------------------+----------+----------+----------------------+
| Morphine            | Other (See Comments) | High     | 20 |   Hallucinations     |
|                     |                      |          | 18       |                      |
+---------------------+----------------------+----------+----------+----------------------+
| Pantoprazole Sodium | Nausea And Vomiting  | Medium   | 20 |                      |
|                     |                      |          | 18       |                      |
+---------------------+----------------------+----------+----------+----------------------+
| Penicillins         | Hives                | Medium   | 20 |                      |
|                     |                      |          | 18       |                      |
+---------------------+----------------------+----------+----------+----------------------+
| Pseudoephedrine     | Anxiety              | High     | 20 |                      |
|                     |                      |          | 18       |                      |
+---------------------+----------------------+----------+----------+----------------------+
| Quinapril Hcl       | Anaphylaxis          | High     | 20 |                      |
|                     |                      |          | 18       |                      |
+---------------------+----------------------+----------+----------+----------------------+
 
 
 
 Current Medications
 
 
+----------------------+----------------------+-----------+---------+------+------+-------+
| Prescription         | Sig.                 | Disp.     | Refills | Star | End  | Statu |
|                      |                      |           |         | t    | Date | s     |
|                      |                      |           |         | Date |      |       |
+----------------------+----------------------+-----------+---------+------+------+-------+
|   cholecalciferol    | Take 1 capsule by    |           |         |      |      | Activ |
| (VITAMIN D-3) 5000   | mouth Every other    |           |         |      |      | e     |
| units TABS           | day.                 |           |         |      |      |       |
+----------------------+----------------------+-----------+---------+------+------+-------+
|   nortriptyline      | Take 25 mg by mouth  |           |         |      |      | Activ |
| (PAMELOR) 25 mg      | 2 times daily. 1     |           |         |      |      | e     |
 
| capsule              | tablet in AM and 3   |           |         |      |      |       |
|                      | tablets in Evening   |           |         |      |      |       |
+----------------------+----------------------+-----------+---------+------+------+-------+
|   BD PEN NEEDLE LISA | Inject 4 Sticks      |           |         | 01/0 |      | Activ |
|  U/F 32G X 4 MM MISC | under the skin 4     |           |         | 9/20 |      | e     |
|                      | times daily as       |           |         | 18   |      |       |
|                      | needed.              |           |         |      |      |       |
+----------------------+----------------------+-----------+---------+------+------+-------+
|   oxybutynin         | Take 2.5 mg by mouth |           |         |      |      | Activ |
| (DITROPAN) 5 mg      |  2 times daily.      |           |         |      |      | e     |
| tablet               |                      |           |         |      |      |       |
+----------------------+----------------------+-----------+---------+------+------+-------+
|   prochlorperazine   | Take 5 mg by mouth   |           |         |      |      | Activ |
| (COMPAZINE) 5 mg     | Twice  daily as      |           |         |      |      | e     |
| tablet               | needed.              |           |         |      |      |       |
+----------------------+----------------------+-----------+---------+------+------+-------+
|   rOPINIRole         | Take 0.75 mg by      |           |         |      |      | Activ |
| (REQUIP) 0.25 mg     | mouth nightly. Takes |           |         |      |      | e     |
| tablet               |  3 tablets at night  |           |         |      |      |       |
+----------------------+----------------------+-----------+---------+------+------+-------+
|   insulin aspart     | Inject 1-6 Units     |   10 mL   | 0       | 02/2 |      | Activ |
| (NOVOLOG FLEXPEN)    | under the skin 3     |           |         | 2/20 |      | e     |
| 100 units/mL         | times daily (with    |           |         | 18   |      |       |
| injection            | meals). CORRECTION   |           |         |      |      |       |
| penIndications: Type | SCALE: Novolog 0-6   |           |         |      |      |       |
|  2 diabetes mellitus | units before meals,  |           |         |      |      |       |
|  with other          | bedtime SC.  Add to  |           |         |      |      |       |
| specified            | nutritional dose if  |           |         |      |      |       |
| complication, with   | applicable.  [BG <   |           |         |      |      |       |
| long-term current    | 150: None]  [BG      |           |         |      |      |       |
| use of insulin (HCC) | 150-200: 1 units]    |           |         |      |      |       |
|                      | [-250: 2       |           |         |      |      |       |
|                      | units]  [-300: |           |         |      |      |       |
|                      |  3 units]  [BG       |           |         |      |      |       |
|                      | 301-350: 4 units]    |           |         |      |      |       |
|                      | [-400: 5       |           |         |      |      |       |
|                      | units]  [BG > 400: 6 |           |         |      |      |       |
|                      |  units and call PCP] |           |         |      |      |       |
+----------------------+----------------------+-----------+---------+------+------+-------+
|   aspirin 81 mg      | Take 1 tablet by     |   90      | 0       | 02/2 |      | Activ |
| chewable tablet      | mouth Daily.         | tablet    |         | 4/20 |      | e     |
|                      |                      |           |         | 18   |      |       |
+----------------------+----------------------+-----------+---------+------+------+-------+
|   insulin degludec   | Inject 4 Units under |   1 pen   | 3       | 03/1 |      | Activ |
| (TRESIBA FLEXTOUCH)  |  the skin every      |           |         | 2/20 |      | e     |
| 100 units/mL         | evening.             |           |         | 18   |      |       |
| injectionIndications |                      |           |         |      |      |       |
| : Type 2 diabetes    |                      |           |         |      |      |       |
| mellitus with        |                      |           |         |      |      |       |
| chronic kidney       |                      |           |         |      |      |       |
| disease on chronic   |                      |           |         |      |      |       |
| dialysis, with       |                      |           |         |      |      |       |
| long-term current    |                      |           |         |      |      |       |
| use of insulin (HCC) |                      |           |         |      |      |       |
+----------------------+----------------------+-----------+---------+------+------+-------+
|   acetaminophen      | Take 2 tablets by    |   120     | 0       | 03/1 |      | Activ |
| (TYLENOL) 325 mg     | mouth every 4 hours  | tablet    |         | 6/20 |      | e     |
| tablet               | as needed for Pain   |           |         | 18   |      |       |
|                      | (or fever >= 38.6 C  |           |         |      |      |       |
|                      | (101.5 F)).          |           |         |      |      |       |
 
+----------------------+----------------------+-----------+---------+------+------+-------+
|   LORazepam (ATIVAN) | Take 1 tablet by     |   60      | 0       | 03/1 |      | Activ |
|  1 mg tablet         | mouth every 8 hours  | tablet    |         | 6/20 |      | e     |
|                      | as needed for        |           |         | 18   |      |       |
|                      | Anxiety.             |           |         |      |      |       |
+----------------------+----------------------+-----------+---------+------+------+-------+
|   albuterol 5 mg/mL  | Take 0.5 mLs by      |   20 vial | 0       | 03/1 |      | Activ |
| nebulizer solution   | nebulization every 4 |           |         | 6/20 |      | e     |
|                      |  hours as needed for |           |         | 18   |      |       |
|                      |  Shortness of Breath |           |         |      |      |       |
|                      |  or Increased Work   |           |         |      |      |       |
|                      | of Breathing.        |           |         |      |      |       |
+----------------------+----------------------+-----------+---------+------+------+-------+
|   losartan (COZAAR)  | Take 100 mg by mouth |           |         |      |      | Activ |
| 100 MG tablet        |  Daily.              |           |         |      |      | e     |
+----------------------+----------------------+-----------+---------+------+------+-------+
|   esomeprazole       | 2 times daily.       |           |         | 05/0 |      | Activ |
| (NEXIUM) 40 mg       |                      |           |         | 2/20 |      | e     |
| capsule              |                      |           |         | 18   |      |       |
+----------------------+----------------------+-----------+---------+------+------+-------+
|   TRINTELLIX 10 MG   | Daily.               |           |         | 04/2 |      | Activ |
| tablet               |                      |           |         | 4/20 |      | e     |
|                      |                      |           |         | 18   |      |       |
+----------------------+----------------------+-----------+---------+------+------+-------+
|   nitroglycerin      | Place 1 tablet under |   100     | 3       | 05/0 | 05/0 | Activ |
| (NITROSTAT) 0.4 mg   |  the tongue every 5  | tablet    |         | /20 | 7/20 | e     |
| SL tablet            | minutes as needed.   |           |         | 18   | 19   |       |
+----------------------+----------------------+-----------+---------+------+------+-------+
|   atorvaSTATin       | Take 1 tablet by     |           |         | 05/1 |      | Activ |
| (LIPITOR) 80 MG      | mouth nightly.       |           |         | 8/20 |      | e     |
| tablet               |                      |           |         | 18   |      |       |
+----------------------+----------------------+-----------+---------+------+------+-------+
|   docusate-senna     | Take 1 tablet by     |           |         |      |      | Activ |
| (SENOKOT-S) 50-8.6   | mouth as needed for  |           |         |      |      | e     |
| mg per tablet        | Constipation.        |           |         |      |      |       |
+----------------------+----------------------+-----------+---------+------+------+-------+
|   furosemide (LASIX) | TAKE 1 TABLET BY     |   30      | 3       | 09/0 |      | Activ |
|  20 mg tablet        | MOUTH ONCE DAILY     | tablet    |         | /20 |      | e     |
|                      |                      |           |         | 18   |      |       |
+----------------------+----------------------+-----------+---------+------+------+-------+
|                      | Take 1 tablet by     |           |         | 08/2 |      | Activ |
| HYDROcodone-acetamin | mouth as needed.     |           |         | 1/20 |      | e     |
| ophen (NORCO) 5-325  |                      |           |         | 18   |      |       |
| mg per tablet        |                      |           |         |      |      |       |
+----------------------+----------------------+-----------+---------+------+------+-------+
|   ondansetron        | Take 1 tablet by     |           |         | 08/2 |      | Activ |
| (ZOFRAN ODT) 4 mg    | mouth as needed.     |           |         | 1/20 |      | e     |
| disintegrating       |                      |           |         | 18   |      |       |
| tablet               |                      |           |         |      |      |       |
+----------------------+----------------------+-----------+---------+------+------+-------+
|   warfarin           | Take 2 mg by mouth   |           |         |      |      | Activ |
| (COUMADIN) 2 mg      | Daily. 1 /2 tablet  |           |         |      |      | e     |
| tablet               | daily                |           |         |      |      |       |
+----------------------+----------------------+-----------+---------+------+------+-------+
|   traMADol (ULTRAM)  | Take 50 mg by mouth  |           |         |      |      | Activ |
| 50 mg tablet         | as needed.           |           |         |      |      | e     |
+----------------------+----------------------+-----------+---------+------+------+-------+
|   carvedilol (COREG) | Take 1.5 tablets by  |   90      | 5       | 09/ |      | Activ |
|  12.5 mg tablet      | mouth 2 times daily  | tablet    |         | 2/20 |      | e     |
|                      | (with breakfast &    |           |         | 18   |      |       |
 
|                      | dinner).             |           |         |      |      |       |
+----------------------+----------------------+-----------+---------+------+------+-------+
|   ondansetron        | Take 1 tablet by     |   24      | 0       | 09/1 |      | Activ |
| (ZOFRAN ODT) 4 mg    | mouth every 8 hours  | tablet    |         |  |      | e     |
| disintegrating       | as needed for        |           |         | 18   |      |       |
| tablet               | Nausea.              |           |         |      |      |       |
+----------------------+----------------------+-----------+---------+------+------+-------+
 
 
 
 Active Problems
 
 
+------------+------------+
| Problem    | Noted Date |
+------------+------------+
| Chest pain | 2018 |
+------------+------------+
 
 
 
+-------------------------------------------------------------------+
|   Overview:   Chest XR 9/15/2018 - Findings are suggestive of     |
| mild pulmonary edema with small pleural effusions.  Borderline    |
| cardiomegaly. Edward Dailey MD Stress test done on 18     |
| shows pharmacologic stress notable for baseline abnormal ECG      |
| without diagnostic changes post stress, mild LV enlargement with  |
| LVEF 33% reviewed, perfusion imaging notable for moderate sized   |
| high intensity area of reduced uptake involving the mid to apical |
|  inferior myocardium as well as the LV apex with minimal          |
| reversibility, consistent with patient's known occluded RCA,  no  |
| significant additional ischemia is noted, overall this is         |
| consistent with the patient's known history of multivessel CAD    |
| and ischemic cardiomyopathy.X-Ray chest 2/10/2019 shows small     |
| bilateral pleural effusions with some mild probable bibasilar     |
| atelectasis, no fulminant pulmonary edema or focal infiltrate is  |
| seen, by Tip Pinto DO.                                  |
+-------------------------------------------------------------------+
 
 
 
+-------------------------------------+------------+
| ACS (acute coronary syndrome) ([facility]) | 09/15/2018 |
+-------------------------------------+------------+
 
 
 
+-------------------------------------------------------------------+
|   Overview:   Echocardiogram on 2018 shows, mild left        |
| ventricular chamber dilatation with upper normal wall thickness,  |
| moderate global left ventricular systolic dysfunction with a      |
| visually estimated left ventricular ejection fraction of 30-35%,  |
| mild left atrial chamber enlargement, surgically repaired mitral  |
| valve with normal function, mild aortic valve regurgitation, mild |
|  pulmonary arterial hypertension, when compared with prior        |
| echocardiogram dated 18, no significant interval change, by |
|  Dejan Breaux MD.                                               |
+-------------------------------------------------------------------+
 
 
 
 
+------------------------------------------------------------------+------------+
| Implantable defibrillator reprogramming/check                    | 2018 |
+------------------------------------------------------------------+------------+
| ICD (implantable cardioverter-defibrillator) Guroo   | 2018 |
| 18 Dora                                                 |            |
+------------------------------------------------------------------+------------+
 
 
 
+------------------------------------------------------------------+
|   Overview:   Formatting of this note may be different from the  |
| original. MODEL NAME MODEL# SERIAL# DATE IMPLANTED GENERATOR     |
| Guroo Dynagen EL ICD [id number] 18 RV LEAD  |
| Guroo [place] 4 Site SG [id number] 18          |
| Indication: Nonischemic dilated cardiopathy with persistent      |
| severely depressed left ventricular systolic function, LVEF of   |
| LVEF of 30%                                                      |
+------------------------------------------------------------------+
 
 
 
+------------------+------------+
| Pleural effusion | 2018 |
+------------------+------------+
 
 
 
+-------------------------------------------------------------------+
|   Last Assessment & Plan:   Post MV repair and CABG x2 2018  |
| and recurrent pleural effusions and elevated ESR. Plan:Received 1 |
|  time dose of Colchicine Started on Prednisone per surgery CXR    |
| today shows stable bilateral pleural effusions                    |
|Started on Prednisone per surgery                                  |
|CXR today shows stable bilateral pleural effusions                 |
+-------------------------------------------------------------------+
 
 
 
+---------------------------------------------+------------+
| Steroid-induced hyperglycemia               | 2018 |
+---------------------------------------------+------------+
| Moderate protein-calorie malnutrition (HCC) | 2018 |
+---------------------------------------------+------------+
| Chronic systolic heart failure (HCC)        | 2018 |
+---------------------------------------------+------------+
 
 
 
+-------------------------------------------------------------------+
|   Overview:   Echocardiogram 2018 shows mild biatrial        |
| dilatation, mild left ventricular dilatation with a mild          |
| eccentric left ventricular hypertrophy, there is a moderate       |
| global hypokinesis of left ventricle, lvef is 30-35%, normal      |
| right ventricular size and wall thickness with a mildly reduced   |
| right ventricular systolic function, mildly thickened and         |
| calcified trileaflet aortic valve with adequate opening, there is |
|  a mild aortic valve insufficiency, mildly thickened and          |
| calcified mitral valve suggesting myxomatous change with evidence |
|  of mitral valve repair, there is at least moderate central       |
 
| mitral valve regurgitation, mild tricuspid valve regurgitation,   |
| mild to moderate pulmonary hypertension with a peak systolic      |
| pressure of 50-55 mmhg, normal ivc with normal respiratory        |
| collapse, when compared to echocardiography on 3/7/18, left       |
| ventricular systolicsystolic function is slightly                 |
| improved.Echocardiogram 2018 show overall left ventricular    |
| systolic function is severely impaired with, an EF between 20 -   |
| 25 %, there is mild aortic regurgitation, there is mild aortic    |
| stenosis present, mild-to-moderate tricuspid regurgitation        |
| present, there is mild pulmonary hypertension, pleural effusion   |
| present. Duglas Ornelas MD, FACC; [facility]   Last Assessment & Plan:  |
|   EF 25% s/p CABG on 18 but now down to 15%. Plan:Continue   |
| Coreg and Losartan Hemodialysis for fluid removal.Strict I/O and  |
| daily weights.                                                    |
+-------------------------------------------------------------------+
 
 
 
+------------------------------------------------+------------+
| Stress hyperglycemia                           | 2018 |
+------------------------------------------------+------------+
| Anemia in ESRD (end-stage renal disease) ([facility]) | 2018 |
+------------------------------------------------+------------+
| PAF (paroxysmal atrial fibrillation) ([facility])     | 2018 |
+------------------------------------------------+------------+
 
 
 
+-------------------------------------------------------------------+
|   Overview:   PAF with dialysis in the past and recurrent post op |
|  MV/CABG surgery.   Last Assessment & Plan:   Remains in          |
| NSRNormal atrial size by echocardiogram. Plan:Continue Coreg with |
|  up-titration as toleratedAmiodarone 400 mg BID while inpatient   |
| and then transition to 200 mg BID for 2 weeks and then 200 mg     |
| daily. Continue po 1-3 months post discharge. Continue warfarin   |
| for CVA prophylaxis, INR 2.3 today                                |
|Continue warfarin for CVA prophylaxis, INR 2.3 today               |
+-------------------------------------------------------------------+
 
 
 
+-------------------------+------------+
| S/P mitral valve repair | 2018 |
+-------------------------+------------+
 
 
 
+-------------------------------------------------------------------+
|   Overview:   Cristopher ring 2.16.18 ([employer]) for severe MR. POLY    |
| left. CABG too, SVG x 2 to OM and RCA.  Last Assessment & Plan:   |
|  Cristopher ring 2.16.18 ([employer]) for severe MR. POLY left. CABG too, |
|  SVG x 2 to OM and RCA.                                           |
+-------------------------------------------------------------------+
 
 
 
+--------------+------------+
| S/P CABG x 2 | 2018 |
+--------------+------------+
 
 
 
 
+------------------------------------------------------------------+
|   Overview:   SVG's to OM and RCA at time of MV ring Cristopher    |
| #28. POLY left.  Last Assessment & Plan:   18: Mitral valve  |
| repair with a 28 Cristopher annuloplasty ring; CABG x2 (SVG to OM  |
| and SVG to RCA)Plan:ASA, statin, BB and ARB                      |
|ASA, statin, BB and ARB                                           |
+------------------------------------------------------------------+
 
 
 
+----------------------------------------+------------+
| Cardiomyopathy, unspecified type ([facility]) | 2018 |
+----------------------------------------+------------+
 
 
 
+-------------------------------------------------------------------+
|   Overview:   Ischemic (3 V CAD) and non ischemic (Hypertension   |
| and MR and DM and ESRD). Tolerated metoprolol, losartan but avoid |
|  spironolactone due to ESRD. Echocardiogram done on 10/24/18      |
| shows mild biatrial dilatation, mild left ventricular dilatation  |
| with a mild eccentric left ventricular hypertrophy, there is a    |
| severe global hypokinesis of the left ventricle, overall, left    |
| ventricular systolic function is severely decreased, LVEF is      |
| 25-30%, mildly thickened and calcified trileaflet aortic valve    |
| with adequate opening, there is aortic valve sclerosis without    |
| significant aortic valve stenosis, there is a mild aortic valve   |
| insufficiency, mildly thickened and calcified mitral valve        |
| suggesting myxoma change with evidence of mitral valve repair,    |
| there is a mild to mitral valve stenosis and a mild mitral valve  |
| regurgitation, mild mitral annular calcification, mild tricuspid  |
| valve regurgitation, mild pulmonary hypertension with a peak      |
| systolic pressure 40-45 mmHg, normal IVC with normal respiratory  |
| collapse, when compared to echocardiography on 18, left      |
| ventricular systolic function continued to be worsened. Johnie    |
| MD Dora  Last Assessment & Plan:   Severe ischemic          |
| cardiomyopathy (EF 15%) and ESRD on HD who presented with dyspnea |
|  related to bilateral pleural effusions and episodic AF with RVR  |
| now s/p thoracentesis with significant improvement in her         |
| symptoms and in NSR. Plan:Volume removal with dialysis as         |
| toleratedLasix 80 mg twice a day Coreg and low dose losartan. Up  |
| titrate as tolerated but working on fluid removal and maintaining |
|  adequate BP. Strict I/O with daily weights.                      |
+-------------------------------------------------------------------+
 
 
 
+--------------------------------------------------------------+---+
| NSTEMI (non-ST elevated myocardial infarction) (HCC)         |   |
+--------------------------------------------------------------+---+
| Coronary artery disease involving native coronary artery of  |   |
| native heart without angina pectoris                         |   |
+--------------------------------------------------------------+---+
 
 
 
+-------------------------------------------------------------------+
|   Overview:   Stress test on 2018 shows pharmacologic stress |
 
|  notable for baseline abnormal ECG without diagnostic changes     |
| post stress, mild LV enlargement with LVEF 33% reviewed,          |
| perfusion imaging notable for moderate sized high intensity area  |
| of reduced uptake involving the mid to apical inferior myocardium |
|  as well as the LV apex with minimal reversibility, consistent    |
| with patient's known occluded RCA, no significant additional      |
| ischemia is noted, overall this is consistent with the patient's  |
| known history of multivessel CAD and ischemic cardiomyopathy, by  |
| Anthony Gunn MD.Echocardiogram on 2018 shows mild left     |
| ventricular chamber dilatation with upper normal wall thickness,  |
| moderate global left ventricular systolic dysfunction with a      |
| visually estimated left ventricular ejection fraction of 30-35%,  |
| mild left atrial chamber enlargement, surgically repaired mitral  |
| valve with normal function, mild aortic valve regurgitation, mild |
|  pulmonary arterial hypertension, when compared with prior        |
| echocardiogram dated 18, no significant interval change, by |
|  Dejan Breaux MD. [facility] on 2012 shows totally occluded right |
|  coronary artery with collateral filling from the left, no        |
| significant stenoses within the left anterior descending artery   |
| and circumflex artery. Patent stents within the left anterior     |
| descending artery, normal intracardiac pressure, mild narrowing   |
| within the distal aorta and iliac arteries. - Sherie Bartholomew,   |
| MDEchocardiogram on 2017 shows Study: a 2-dimensional        |
| transthoracic echocardiogram with m-mode, spectral and color flow |
|  Doppler was performed, left ventricular systolic function is     |
| low-normal with, an EF between 50 - 55 %, the left ventricle      |
| cavity size is normal, the RV is normal in size and function, the |
|  left atrium is normal in size, the right atrium is normal in     |
| size, aortic valve is trileaflet and is mildly thickened, there   |
| is mild aortic regurgitation, there is no evidence of aortic      |
| stenosis, the mitral valve is normal. - OLIVA Huffman |
|  on 2017 shows severe triple-vessel coronary artery disease   |
| with moderate ostial left anterior descending artery and patent   |
| stent in the midsegment, moderate stenosis in the second obtuse   |
| marginal branch and total occlusion of the proximal right         |
| coronary artery, which is known from before, given the patient's  |
| symptoms at this time, recommendation given. The patient          |
| understands. We will proceed with further medical management with |
|  blood pressure control. Also, we will optimize our blood         |
| pressure management. Further evaluation for the ostial left       |
| anterior descending artery and circumflex lesion upon recurrent   |
| symptoms for the patient, the patient will be evaluated for any   |
| further symptoms and fractional flow reserve of the left anterior |
|  descending artery and possible intervention on the left          |
| circumflex system will be considered. - OLIVA Diane   |
| on 2017 shows severe 3-vessel coronary artery disease with   |
| moderate to severe proximal left anterior descending with         |
| significant fractional flow reserve value of 0.77, severe mid     |
| large marginal branch, and chronically occluded right coronary    |
| artery with left-to-right collaterals, normal left ventricular    |
| end-diastolic pressure. - OLIVA Lambert on 2017     |
| shows three-vessel coronary artery disease with a chronically     |
| occluded right coronary artery with left-to-right collaterals,    |
| severe proximal left anterior descending artery with a patent     |
| stent in the mid left anterior descending artery and a            |
| significant FFR value of 0.77 during diagnostic angiogram on      |
| 2017, and severe disease of the second marginal branch |
|  of the left circumflex artery, successful PTCA and stenting of   |
| the second marginal branch of the left circumflex artery using a  |
| 2.25 x 16 and a 2.25 x 8 mm overlapping synergy drug-eluting      |
 
| stents postdilated using a 2.25 noncompliant balloon, successful  |
| direct stenting of the proximal and ostium of the left anterior   |
| descending artery using a 3.5 x 16 mm Synergy drug-eluting stent  |
| postdilated using a 3.5 noncompliant balloon. - Marcos Gamboa, |
|  MDEchocardiogram on 2018 shows the left ventricle is normal |
|  in size, wall thickness and moderately impaired systolic         |
| function EF 35-40%. Inferior, inferolateral and anterolateral     |
| hypokinesis, the right ventricle is normal in size and function,  |
| severe mitral regurgitation with moderately dilated left atrium,  |
| mild tricuspid regurgitation and mild pulmonary hypertension RVSP |
|  48 mmHg, there is no pericardial effusion, new wall motion       |
| abnormalities and new severe mitral regurgitation compared to     |
| prior study. - Celestino Porras [facility] on 2018 shows         |
| three-vessel coronary artery disease with widely patent stent in  |
| the left anterior descending artery and severe stent restenosis   |
| in the marginal branch, occluded right coronary artery with       |
| collateral from left to right, severe left ventricular            |
| dysfunction with severe mitral regurgitation, status post         |
| percutaneous transluminal coronary angioplasty of in-stent        |
| restenosis within the marginal branch, recommend consideration    |
| for mitral valve replacement and 2-vessel coronary artery bypass  |
| surgery to the right coronary artery and marginal branch. - Iyad  |
| MD DustinTEE and CABG x2 on 2018 shows Severely reduced LV  |
| systolic function. Visually estimated LVEF 25%, normal LV size    |
| and shape. Normal wall thickness, normal RV size with normal      |
| function, LA enlarged. POLY normal size without SEC, masses, or    |
| thrombi. IAS intact, no PFO detected, mitral valve with bileaflet |
|  thickening and severely restricted motion, associated with       |
| ventricular tethering. Severe functional MR with central jet,     |
| trileaflet aortic valve with mild sclerosis. No significant       |
| aortic stenosis. Mild AI with central jet, tricuspid valve normal |
|  structure and function. Trace TR, pulmonic valve normal Trace    |
| CA, visible portions of ascending aorta and arch normal. Grade II |
|  atherosclerotic disease of descending aorta, pulmonary artery    |
| normal, normal pericardium. No pericardial or pleural effusions,  |
| left ventricular function slightly improved and right ventricular |
|  function unchanged compared to preop,  28 mm mitral valve        |
| annuloplasty ring is well-seated with an acceptable inflow        |
| gradient across the mitral valve and no MR noted, no change in    |
| the aortic, tricuspid, or pulmonic valves compared to preop, no   |
| change in visible portions of aorta after decannulation, LV and   |
| RV function unchanged after sternal closure. - Jagjit Hickey,       |
| MDEchocardiogram on 3/7/2018 shows a complete two-dimensional     |
| transthoracic echocardiogram was performed (2D, M-mode, Doppler   |
| and color flow Doppler), left ventricular systolic function is    |
| severely reduced, the visually estimated LV ejection fraction is  |
| 15%, the left and right atrial chambers are both normal in size,  |
| there is mild mitral regurgitation, an annuloplasty ring is noted |
|  in the mitral position, there is mild tricuspid regurgitation,   |
| Estimated PA pressure is 42 mmHg, the aortic valve leaflets       |
| appear mildly calcified, the aortic valve opens well, the aortic  |
| valve mean systolic gradient was measured at 9 mm Hg. - Star    |
| MD Alvarez  Last Assessment & Plan:   [facility]GI in [facility] 5 months |
|  ago. 1 week off Plavix for CAGG/MVr surgery 2.16. Now and in the |
|  past with PAFAlso statin - data in dialysis patients for primary |
|  prevention with statin is not beneficial but this is secondary   |
| prevention. However, instead of high dose statin, moderate dose   |
| due to ESRD with slight increased risk of rhabdo. - Warfarin -    |
| ASA - Moderate dose Statin                                        |
+-------------------------------------------------------------------+
 
 
 
 
+----------------------+---+
| Mixed hyperlipidemia |   |
+----------------------+---+
 
 
 
+-------------------------------------------------------------+
|   Last Assessment & Plan:   Moderate dose statin and don't  |
| titrate due to ESRD and ASHD. - Continue Atorvastatin 20mg  |
+-------------------------------------------------------------+
 
 
 
+------------------------------------------------------------------+---+
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
| dialysis, with long-term current use of insulin ([facility])            |   |
+------------------------------------------------------------------+---+
| ESRD (end stage renal disease) (HCC)                             |   |
+------------------------------------------------------------------+---+
 
 
 
+--------------------------------------------------------+
|   Last Assessment & Plan:   Dialysis Tues, Thurs, Sat. |
| 2 L removal on HD yesterday and 1.1 L today            |
| Nephrology following.                                  |
| Chronic ESRD anemia.                                   |
|                                                        |
| Plan:                                                  |
| Continue Lasix and HD                                  |
+--------------------------------------------------------+
 
 
 
+-------------------------+---+
| Hypertension, essential |   |
+-------------------------+---+
 
 
 
+------------------------------------------------------------------+
|   Last Assessment & Plan:   Will resume metoprolol and losartan  |
| as BP allows.                                                    |
+------------------------------------------------------------------+
 
 
 
+----------------------------------------------------+---+
| COPD (chronic obstructive pulmonary disease) (HCC) |   |
+----------------------------------------------------+---+
| Severe mitral regurgitation                        |   |
+----------------------------------------------------+---+
 
 
 
 Resolved Problems
 
 
 
+-------------------------------+----------+-----------+
| Problem                       | Noted    | Resolved  |
|                               | Date     | Date      |
+-------------------------------+----------+-----------+
| Junctional cardiac arrhythmia | 20 |  |
|                               | 18       | 8         |
+-------------------------------+----------+-----------+
 
 
 
+----------------------------------------------------------------+
|   Last Assessment & Plan:   Persistent. Will stop Amiodarone.  |
+----------------------------------------------------------------+
 
 
 
 Encounters
 
 
+--------+-----------+-----------+----------------------+----------------------+
| Date   | Type      | Specialty | Care Team            | Description          |
+--------+-----------+-----------+----------------------+----------------------+
| / | Telephone |           |   Johnie John, | Other (angina)       |
|    |           |           |  MD                  |                      |
+--------+-----------+-----------+----------------------+----------------------+
| / | Implant   |           |   Johnie John, | Remote Device        |
|    | Monitor   |           |  MD                  | Interrogation        |
|        |           |           |                      | (Primary Dx); ICD    |
|        |           |           |                      | (implantable         |
|        |           |           |                      | cardioverter-defibri |
|        |           |           |                      | llator) [employer]       |
|        |           |           |                      | Scientific  18   |
|        |           |           |                      | Dora;           |
|        |           |           |                      | Cardiomyopathy,      |
|        |           |           |                      | unspecified type     |
|        |           |           |                      | (HCC)                |
+--------+-----------+-----------+----------------------+----------------------+
| / | Telephone |           |   Johnie John, | Lab Order (Pt due    |
|    |           |           |  MD                  | for labs prior to    |
|        |           |           |                      | appt. )              |
+--------+-----------+-----------+----------------------+----------------------+
| / | Telephone |           |   Johnie John, | Other (medication    |
2019   |           |           |  MD                  | changed due to ER    |
|        |           |           |                      | visit)               |
+--------+-----------+-----------+----------------------+----------------------+
| / | Telephone |           |   Johnie John, | Other ([facility])     |
|    |           |           |  MD                  |                      |
+--------+-----------+-----------+----------------------+----------------------+
| / | Telephone |           |   Abhijit,        | Other (heart issues) |
|    |           |           | JANETTE Plunkett         |                      |
+--------+-----------+-----------+----------------------+----------------------+
 from Last 3 Months
 
 Family History
 
 
+------------------+-----------+------+----------+
| Medical History  | Relation  | Name | Comments |
+------------------+-----------+------+----------+
 
| High cholesterol | Father    |      |          |
+------------------+-----------+------+----------+
| Hypertension     | Father    |      |          |
+------------------+-----------+------+----------+
| PVD              | Father    |      |          |
+------------------+-----------+------+----------+
| Dementia         | Mother    |      |          |
+------------------+-----------+------+----------+
| Diabetes         | Paternal  |      |          |
|                  | Grandmoth |      |          |
|                  | er        |      |          |
+------------------+-----------+------+----------+
| Kidney disease   | Neg Hx    |      |          |
+------------------+-----------+------+----------+
 
 
 
+----------------------+------+----------+----------+
| Relation             | Name | Status   | Comments |
+----------------------+------+----------+----------+
| Father               |      |  | PVD      |
+----------------------+------+----------+----------+
| Mother               |      |  | Dementia |
+----------------------+------+----------+----------+
| Paternal Grandmother |      |  |          |
+----------------------+------+----------+----------+
 
 
 
 Social History
 
 
+---------------+------------+-----------+--------+------------------+
| Tobacco Use   | Types      | Packs/Day | Years  | Date             |
|               |            |           | Used   |                  |
+---------------+------------+-----------+--------+------------------+
| Former Smoker | Cigarettes | 1         | 30     | Quit: 2004 |
+---------------+------------+-----------+--------+------------------+
 
 
 
+---------------------+---+---+---+
| Smokeless Tobacco:  |   |   |   |
| Never Used          |   |   |   |
+---------------------+---+---+---+
 
 
 
+-------------+-----------+---------+----------+
| Alcohol Use | Drinks/We | oz/Week | Comments |
|             | ek        |         |          |
+-------------+-----------+---------+----------+
| No          |           |         |          |
+-------------+-----------+---------+----------+
 
 
 
+------------------+---------------+
| Sex Assigned at  | Date Recorded |
| Birth            |               |
 
+------------------+---------------+
| Not on file      |               |
+------------------+---------------+
 
 
 
 Last Filed Vital Signs
 
 
+-------------------+----------------------+---------------------+
| Vital Sign        | Reading              | Time Taken          |
+-------------------+----------------------+---------------------+
| Blood Pressure    | 125/55               | 20185 PDT |
+-------------------+----------------------+---------------------+
| Pulse             | 62                   | 20185 PDT |
+-------------------+----------------------+---------------------+
| Temperature       | 37   C (98.6   F)    | 2018 PDT |
+-------------------+----------------------+---------------------+
| Respiratory Rate  | 16                   | 2018 PDT |
+-------------------+----------------------+---------------------+
| Oxygen Saturation | 97%                  | 2018 PDT |
+-------------------+----------------------+---------------------+
| Inhaled Oxygen    | -                    | -                   |
| Concentration     |                      |                     |
+-------------------+----------------------+---------------------+
| Weight            | 65.3 kg (143 lb 15.4 | 2018 0600 PDT |
|                   |  oz)                 |                     |
+-------------------+----------------------+---------------------+
| Height            | 177.8 cm (5' 10")    | 09/15/2018 0045 PDT |
+-------------------+----------------------+---------------------+
| Body Mass Index   | 20.66                | 2018 0600 PDT |
+-------------------+----------------------+---------------------+
 
 
 
 Plan of Treatment
 
 
+--------+---------+-----------+----------------------+-------------+
| Date   | Type    | Specialty | Care Team            | Description |
+--------+---------+-----------+----------------------+-------------+
| / | Office  |           |   Johnie John, |             |
|    | Visit   |           |  MD  [address] |             |
|        |         |           |  [place],   |             |
|        |         |           | [place] [address]             |             |
|        |         |           | [phone]         |             |
|        |         |           | [phone] (Fax)   |             |
+--------+---------+-----------+----------------------+-------------+
 
 
 
+----------------------+-----------+------------+----------+
| Health Maintenance   | Due Date  | Last Done  | Comments |
+----------------------+-----------+------------+----------+
| Hepatitis C          |  |            |          |
| Screening            | 9         |            |          |
+----------------------+-----------+------------+----------+
| Diabetic Eye Exam    |  |            |          |
|                      | 7         |            |          |
+----------------------+-----------+------------+----------+
 
| Diabetic Foot Exam   |  |            |          |
|                      | 7         |            |          |
+----------------------+-----------+------------+----------+
| Vaccine:             |  |            |          |
| Dtap/Tdap/Td (1 -    | 8         |            |          |
| Tdap)                |           |            |          |
+----------------------+-----------+------------+----------+
| BREAST CANCER        |  |            |          |
| SCREENING (MAMM Q2   | 9         |            |          |
| YEARS 50-74)         |           |            |          |
+----------------------+-----------+------------+----------+
| Colorectal Cancer    |  |            |          |
| Screening            | 9         |            |          |
| (Colonoscopy)        |           |            |          |
+----------------------+-----------+------------+----------+
| Vaccine: Zoster (1   |  |            |          |
| of 2)                | 9         |            |          |
+----------------------+-----------+------------+----------+
| Vaccine:             |  | 2012 |          |
| Pneumococcal 65+     | 4         |            |          |
| High/Highest Risk (1 |           |            |          |
|  of 2 - PCV13)       |           |            |          |
+----------------------+-----------+------------+----------+
| Adult Annual         |  |            |          |
| Wellness Visit       | 6         |            |          |
+----------------------+-----------+------------+----------+
| Hemoglobin A1c Q3    | 05/15/201 | 02/15/2018 |          |
| Months               | 8         |            |          |
+----------------------+-----------+------------+----------+
| Vaccine: Influenza   |  |            |          |
| (Season Ended)       | 9         |            |          |
+----------------------+-----------+------------+----------+
 
 
 
 Implants
 
 
+-------------------------------+--------+-------+-------------+--------+--------+--------+
| Implanted                     | Type   | Area  | Manufacture | Device | Expira | Model  |
|                               |        |       | r           |        | tion   | /      |
|                               |        |       |             | Identi | Date   | Serial |
|                               |        |       |             | fier   |        |  / Lot |
+-------------------------------+--------+-------+-------------+--------+--------+--------+
| Ring Annlplst Cosgrv 28mm -   | Generi | N/A:  | CHRIS     |        | / | [id number] |
| [id number]: Qty: 1 on  | c      | Heart | LIFESCIENCE |        | 2   | MM     |
| 2018 by Anthony Beth   |        |       | [employer] -     |        |        | /[id number] |
| MD JIMMY                         |        |       | EDLS        |        |        | 34 /   |
+-------------------------------+--------+-------+-------------+--------+--------+--------+
| Dynagen El Icd Vr Implanted:  | Implan | N/A:  | BOSTON      |        | / | D150   |
| Qty: 1 on 2018 by       | table  | Chest | [employer]  |        |    | /[id number] |
| Johnie John MD         | Cardio |       | [facility] [employer] |        |        | 3      |
|                               | verter |       |             |        |        | / |
|                               |        |       |             |        |        | 9      |
|                               | Defibr |       |             |        |        |        |
|                               | illato |       |             |        |        |        |
|                               | r      |       |             |        |        |        |
+-------------------------------+--------+-------+-------------+--------+--------+--------+
| Endotak Drybranch Sg           | Lead   | N/A:  | BOSTON      |        | 10/21/ | 292   |
| Implanted: Qty: 1 on          |        | Chest | [employer]  |        |    | /[id number] |
 
| 2018 by Dora,      |        |       | [employer] [employer] |        |        | 5      |
| MD Johnie                    |        |       |             |        |        | /[id number] |
|                               |        |       |             |        |        | 0      |
+-------------------------------+--------+-------+-------------+--------+--------+--------+
 
 
 
 Procedures
 
 
+-----------------+--------+-------------+----------------------+----------------------+
| Procedure Name  | Priori | Date/Time   | Associated Diagnosis | Comments             |
|                 | ty     |             |                      |                      |
+-----------------+--------+-------------+----------------------+----------------------+
| DEVICE          | Routin | 2019  |   Remote Device      |   Results for this   |
| INTERROGATION-  | e      | 2359 PDT    | Interrogation   ICD  | procedure are in the |
| REMOTE          |        |             | (implantable         |  results section.    |
|                 |        |             | cardioverter-defibri |                      |
|                 |        |             | llator) [facility]       |                      |
|                 |        |             | Scientific  18   |                      |
|                 |        |             | Dora            |                      |
|                 |        |             | Cardiomyopathy,      |                      |
|                 |        |             | unspecified type     |                      |
|                 |        |             | ([facility])                |                      |
+-----------------+--------+-------------+----------------------+----------------------+
 from Last 3 Months
 
 Results
 Device Interrogation - Remote (2019)
 
+-----------------------------------------------------------------------+--------------+
| Narrative                                                             | Performed At |
+-----------------------------------------------------------------------+--------------+
|   This result has an attachment that is not available.  Johnie        |   [facility]    |
| MD Dora         2019 15:05Date of Remote Interrogation:    |              |
| 19 Refer to Paceart documentation and remote PDF scanned into    |              |
| EPIC for remote interrogation results.    Data collected by Clementina SALAZAR     |              |
| NAHUN Maynard Presenting rhythm:    sinus rhythm 59-60 beats.0           |              |
| ventricular high rate episodes. No tachycardia therapies recommended  |              |
| or delivered.Histogram    good.     Battery longevity                 |              |
| 12    years.Apparent normal and stable device function.Device         |              |
| interrogation due in office in 2019.                        |              |
|recommended or delivered.                                              |              |
|Histogram    good.     Battery longevity 12    years.                 |              |
|Apparent normal and stable device function.                            |              |
|Device interrogation due in office in 2019.                  |              |
|                                                                       |              |
+-----------------------------------------------------------------------+--------------+
 
 
 
+--------------+---------+--------------------+--------------+
| Performing   | Address | City/State/Zipcode | Phone Number |
| Organization |         |                    |              |
+--------------+---------+--------------------+--------------+
|   [facility]    |         |                    |              |
+--------------+---------+--------------------+--------------+
 from Last 3 Months
 
 Insurance
 
 
 
+-----------------+--------+-------------+--------+-------------+---------+
| Payer           | Benefi | Subscriber  | Type   | Phone       | Address |
|                 | t Plan | ID          |        |             |         |
|                 |  /     |             |        |             |         |
|                 | Group  |             |        |             |         |
+-----------------+--------+-------------+--------+-------------+---------+
| MEDICARE        | MEDICA | [id number]  | Medica | [phone] |         |
|                 | RE     |             | re     | [phone]        |         |
|                 | PART A |             |        |             |         |
|                 |  AND B |             |        |             |         |
+-----------------+--------+-------------+--------+-------------+---------+
| MEDICAID [place] | MEDICA | [id number]    | Medica | [phone] |         |
|                 | [place]  |             | id     | [phone]        |         |
|                 | PLUS   |             |        |             |         |
+-----------------+--------+-------------+--------+-------------+---------+
 
 
 
+----------------------+--------+-------------+--------+-------------+-----------------+
| Guarantor Name       | Accoun | Relation to | Date   | Phone       | Billing Address |
|                      | t Type |  Patient    | of     |             |                 |
|                      |        |             | Birth  |             |                 |
+----------------------+--------+-------------+--------+-------------+-----------------+
| CHERIE JO | Person | Self        | / |   Home:     |   [address] [place] [address]    |
|                      | al/Fam |             | 1950   | [phone] | [place]    |
|                      | melina    |             |        | [address]        | [address]      |
+----------------------+--------+-------------+--------+-------------+-----------------+

## 2019-04-03 NOTE — XMS
Encounter Summary
  Created on: 2019
 
 Cherie Villalobos
 External Reference #: [id number]
 : 48
 Sex: Female
 
 Demographics
 
 
+-----------------------+--------------------------+
| Address               | [address]               |
|                       | [place]  [address] |
+-----------------------+--------------------------+
| Home Phone            | [phone]          |
+-----------------------+--------------------------+
| Preferred Language    | Unknown                  |
+-----------------------+--------------------------+
| Marital Status        |                   |
+-----------------------+--------------------------+
| Congregational Affiliation | 1013                     |
+-----------------------+--------------------------+
| Race                  | Unknown                  |
+-----------------------+--------------------------+
| Ethnic Group          | Unknown                  |
+-----------------------+--------------------------+
 
 
 Author
 
 
+--------------+--------------------------------------------+
| Author       | [facility]  |
|              | [facility] [place]                                |
+--------------+--------------------------------------------+
| Organization | [facility]  |
|              | [facility] [place]                                |
+--------------+--------------------------------------------+
| Address      | Unknown                                    |
+--------------+--------------------------------------------+
| Phone        | Unavailable                                |
+--------------+--------------------------------------------+
 
 
 
 Support
 
 
+---------------+--------------+---------------------+-----------------+
| Name          | Relationship | Address             | Phone           |
+---------------+--------------+---------------------+-----------------+
| Anoop Villalobos | ECON         | [address], | [phone] |
|               |              |  [place]  [address]     |                 |
+---------------+--------------+---------------------+-----------------+
| Jennifer Dickson | ECON         | [place]       | [phone] |
|               |              | [id number]               |                 |
+---------------+--------------+---------------------+-----------------+
 
 
 
 
 Care Team Providers
 
 
+--------------------------+------+-----------------+
| Care Team Member Name    | Role | Phone           |
+--------------------------+------+-----------------+
| Araseli Montelongo Activity  | PCP  | [phone] |
| Professional             |      |                 |
+--------------------------+------+-----------------+
 
 
 
 Reason for Visit
 
 
+--------+----------+
| Reason | Comments |
+--------+----------+
| Other  | [place] |
+--------+----------+
 
 
 
 Encounter Details
 
 
+--------+-----------+----------------------+----------------------+------------------+
| Date   | Type      | Department           | Care Team            | Description      |
+--------+-----------+----------------------+----------------------+------------------+
| / | Telephone |   [facility]          |   Johnie John, | Other ([place]) |
| 2019   |           | CARDIOLOGY  [address]    |  MD  [address] |                  |
|        |           | [address]  [place], |  [place]  [place],   |                  |
|        |           |  [place] [address]       | [place] [address]             |                  |
|        |           | [phone]         | [phone]         |                  |
|        |           |                      | [phone] (Fax)   |                  |
+--------+-----------+----------------------+----------------------+------------------+
 
 
 
 Social History
 
 
+---------------+------------+-----------+--------+------------------+
| Tobacco Use   | Types      | Packs/Day | Years  | Date             |
|               |            |           | Used   |                  |
+---------------+------------+-----------+--------+------------------+
| Former Smoker | Cigarettes | 1         | 30     | Quit: 2004 |
+---------------+------------+-----------+--------+------------------+
 
 
 
+---------------------+---+---+---+
| Smokeless Tobacco:  |   |   |   |
| Never Used          |   |   |   |
+---------------------+---+---+---+
 
 
 
 
+-------------+-----------+---------+----------+
| Alcohol Use | Drinks/We | oz/Week | Comments |
|             | ek        |         |          |
+-------------+-----------+---------+----------+
| No          |           |         |          |
+-------------+-----------+---------+----------+
 
 
 
+------------------+---------------+
| Sex Assigned at  | Date Recorded |
| Birth            |               |
+------------------+---------------+
| Not on file      |               |
+------------------+---------------+
 as of this encounter
 
 Functional Status
 
 
+---------------------------------------------+----------+--------------------+
| Functional Status                           | Response | Date of Assessment |
+---------------------------------------------+----------+--------------------+
| Are you deaf or do you have serious         | No       | 2018         |
| difficulty hearing?                         |          |                    |
+---------------------------------------------+----------+--------------------+
| Are you blind or do you have serious        | No       | 2018         |
| difficulty seeing, even when wearing        |          |                    |
| glasses?                                    |          |                    |
+---------------------------------------------+----------+--------------------+
| Do you have serious difficulty walking or   | No       | 2018         |
| climbing stairs? (5 years old or older)     |          |                    |
+---------------------------------------------+----------+--------------------+
| Do you have difficulty dressing or bathing? | No       | 2018         |
|  (5 years old or older)                     |          |                    |
+---------------------------------------------+----------+--------------------+
| Because of a physical, mental, or emotional | No       | 2018         |
|  condition, do you have difficulty doing    |          |                    |
| errands alone such as visiting a doctor's   |          |                    |
| office or shopping? [15 years old or        |          |                    |
| older)]                                     |          |                    |
+---------------------------------------------+----------+--------------------+
 
 
 
+---------------------------------------------+----------+--------------------+
| Cognitive Status                            | Response | Date of Assessment |
+---------------------------------------------+----------+--------------------+
| Because of a physical, mental, or emotional | No       | 2018         |
|  condition, do you have serious difficulty  |          |                    |
| concentrating, remembering, or making       |          |                    |
| decisions? (5 years old or older)           |          |                    |
+---------------------------------------------+----------+--------------------+
 as of this encounter
 
 Plan of Treatment
 
 
+--------+---------+------------+----------------------+-------------+
 
| Date   | Type    | Specialty  | Care Team            | Description |
+--------+---------+------------+----------------------+-------------+
| / | Office  | Cardiology |   Johnie John, |             |
|    | Visit   |            |  MD  [address] |             |
|        |         |            |  [place],   |             |
|        |         |            | [place] [address]             |             |
|        |         |            | [phone]         |             |
|        |         |            | [phone] (Fax)   |             |
+--------+---------+------------+----------------------+-------------+
 as of this encounter
 
 Visit Diagnoses
 Not on filein this encounter

## 2019-04-03 NOTE — XMS
Encounter Summary
  Created on: 2019
 
 Cherie Villalobos
 External Reference #: [id number]
 : 48
 Sex: Female
 
 Demographics
 
 
+-----------------------+--------------------------+
| Address               | [address]               |
|                       | [place]  [address] |
+-----------------------+--------------------------+
| Home Phone            | [phone]          |
+-----------------------+--------------------------+
| Preferred Language    | Unknown                  |
+-----------------------+--------------------------+
| Marital Status        |                   |
+-----------------------+--------------------------+
| Jew Affiliation | 1013                     |
+-----------------------+--------------------------+
| Race                  | Unknown                  |
+-----------------------+--------------------------+
| Ethnic Group          | Unknown                  |
+-----------------------+--------------------------+
 
 
 Author
 
 
+--------------+-----------------------+
| Author       | [facility] |
+--------------+-----------------------+
| Organization | [facility] |
+--------------+-----------------------+
| Address      | Unknown               |
+--------------+-----------------------+
| Phone        | Unavailable           |
+--------------+-----------------------+
 
 
 
 Support
 
 
+---------------+--------------+---------------------+-----------------+
| Name          | Relationship | Address             | Phone           |
+---------------+--------------+---------------------+-----------------+
| Anoop Villalobos | ECON         | [place], | [phone] |
|               |              |  [place]  [address]     |                 |
+---------------+--------------+---------------------+-----------------+
| Jennifer Dickson | ECON         | [place]       | [phone] |
|               |              | [address]               |                 |
+---------------+--------------+---------------------+-----------------+
 
 
 
 
 Care Team Providers
 
 
+-----------------------+------+-----------------+
| Care Team Member Name | Role | Phone           |
+-----------------------+------+-----------------+
| Ivy Couch DO      | PCP  | [phone] |
+-----------------------+------+-----------------+
 
 
 
 Reason for Visit
 
 
+--------+-----------------+
| Reason | Comments        |
+--------+-----------------+
| Other  | caregiver/Rehab |
+--------+-----------------+
 
 
 
 Encounter Details
 
 
+--------+-----------+----------------------+----------------------+-------------------+
| Date   | Type      | Department           | Care Team            | Description       |
+--------+-----------+----------------------+----------------------+-------------------+
| / | Telephone |   [facility] |   Srinivasan Jordan,  | Other             |
| 2019   |           |  and Ankle  [address]      | DPM  [address], | (caregiver/Rehab) |
|        |           | [address]   |  [address]  [place],  |                   |
|        |           | [place], [place]         | [place] [address]             |                   |
|        |           | [address]           | [phone]         |                   |
|        |           | [phone]         | [phone] (Fax)   |                   |
+--------+-----------+----------------------+----------------------+-------------------+
 
 
 
 Social History
 
 
+---------------+------------+-----------+--------+------------------+
| Tobacco Use   | Types      | Packs/Day | Years  | Date             |
|               |            |           | Used   |                  |
+---------------+------------+-----------+--------+------------------+
| Former Smoker | Cigarettes | 1         | 30     | Quit: 2004 |
+---------------+------------+-----------+--------+------------------+
 
 
 
+---------------------+---+---+---+
| Smokeless Tobacco:  |   |   |   |
| Never Used          |   |   |   |
+---------------------+---+---+---+
 
 
 
+------------------------------+
| Comments: quite smoking 2004 |
 
+------------------------------+
 
 
 
+-------------+-----------+---------+----------+
| Alcohol Use | Drinks/We | oz/Week | Comments |
|             | ek        |         |          |
+-------------+-----------+---------+----------+
| No          |           |         |          |
+-------------+-----------+---------+----------+
 
 
 
+------------------+---------------+
| Sex Assigned at  | Date Recorded |
| Birth            |               |
+------------------+---------------+
| Not on file      |               |
+------------------+---------------+
 as of this encounter
 
 Plan of Treatment
 
 
+--------+---------+-----------+----------------------+-------------+
| Date   | Type    | Specialty | Care Team            | Description |
+--------+---------+-----------+----------------------+-------------+
| 04/10/ | Office  | Podiatry  |   Srinivasan Jordan,  |             |
|    | Visit   |           | DPM  [address], |             |
|        |         |           |  [address]  [place],  |             |
|        |         |           | [place] [address]             |             |
|        |         |           | [phone]         |             |
|        |         |           | [phone] (Fax)   |             |
+--------+---------+-----------+----------------------+-------------+
 as of this encounter
 
 Visit Diagnoses
 Not on filein this encounter

## 2019-04-03 NOTE — XMS
Encounter Summary
  Created on: 2019
 
 Cherie Villalobos
 External Reference #: [id number]
 : 48
 Sex: Female
 
 Demographics
 
 
+-----------------------+--------------------------+
| Address               | [address]               |
|                       | [place]  [address] |
+-----------------------+--------------------------+
| Home Phone            | [phone]          |
+-----------------------+--------------------------+
| Preferred Language    | Unknown                  |
+-----------------------+--------------------------+
| Marital Status        |                   |
+-----------------------+--------------------------+
| Mandaeism Affiliation | [id number]                     |
+-----------------------+--------------------------+
| Race                  | Unknown                  |
+-----------------------+--------------------------+
| Ethnic Group          | Unknown                  |
+-----------------------+--------------------------+
 
 
 Author
 
 
+--------------+-----------------------+
| Author       | [facility] |
+--------------+-----------------------+
| Organization | [facility] |
+--------------+-----------------------+
| Address      | Unknown               |
+--------------+-----------------------+
| Phone        | Unavailable           |
+--------------+-----------------------+
 
 
 
 Support
 
 
+---------------+--------------+---------------------+-----------------+
| Name          | Relationship | Address             | Phone           |
+---------------+--------------+---------------------+-----------------+
| Anoop Villalobos | ECON         | [place], | [phone] |
|               |              |  [place]  [id number]     |                 |
+---------------+--------------+---------------------+-----------------+
| Jennifer Dickson | ECON         | [place]       | [phone] |
|               |              | [id number]               |                 |
+---------------+--------------+---------------------+-----------------+
 
 
 
 
 Care Team Providers
 
 
+-----------------------+------+-----------------+
| Care Team Member Name | Role | Phone           |
+-----------------------+------+-----------------+
| Ivy Couch DO      | PCP  | [phone] |
+-----------------------+------+-----------------+
 
 
 
 Reason for Visit
 
 
+-----------+------------------------------------------------------------------+
| Reason    | Comments                                                         |
+-----------+------------------------------------------------------------------+
| Foot Pain | possible infection in rt foot, was told to come in for a direct  |
|           | admit through the ER                                             |
+-----------+------------------------------------------------------------------+
 Auth/Cert
 
+--------+--------+-----------+--------------+--------------+---------------+
| Status | Reason | Specialty | Diagnoses /  | Referred By  | Referred To   |
|        |        |           | Procedures   | Contact      | Contact       |
+--------+--------+-----------+--------------+--------------+---------------+
|        |        | Internal  |   Diagnoses  |              |   [facility]    |
|        |        | Medicine  |  PVD         |              | [facility]   |
|        |        |           | (peripheral  |              | [address]  [address] |
|        |        |           | vascular     |              |  [address]   |
|        |        |           | disease)     |              | [place], [place]  |
|        |        |           | (HCC)  ESRD  |              | [address]  Phone: |
|        |        |           | (end stage   |              |  [phone] |
|        |        |           | renal        |              |   Fax:        |
|        |        |           | disease) on  |              | [phone]  |
|        |        |           | dialysis     |              |               |
|        |        |           | (HCC)        |              |               |
+--------+--------+-----------+--------------+--------------+---------------+
 
 
 
 
 Encounter Details
 
 
+--------+-----------+----------------------+----------------------+----------------------+
| Date   | Type      | Department           | Care Team            | Description          |
+--------+-----------+----------------------+----------------------+----------------------+
| / | Hospital  |   [facility]    |   [facility],        | PVD (peripheral      |
| 2019 - | Encounter | [address]   | MD Maria Luisa Jarrett      | vascular disease)    |
|        |           | [facility] | [address]           | ([facility]) (Primary Dx);  |
| / |           |   [address]     | [place] [address]   | ESRD (end stage      |
| 2019   |           | [place] [address]   | [phone]         | renal disease) on    |
|        |           | [phone]         | [phone] (Fax)   | dialysis ([facility]);      |
|        |           |                      | Moiz Josue      | Anemia in ESRD       |
|        |           |                      | MD [address] | (end-stage renal     |
|        |           |                      |  Blvd  [place], [place]  | disease) ([facility]);      |
|        |           |                      | [address]  [phone]  | Hypoalbuminemia;     |
|        |           |                      |  [phone] (Fax)  | Electrolyte          |
 
|        |           |                      |                      | imbalance risk       |
+--------+-----------+----------------------+----------------------+----------------------+
 
 
 
 Social History
 
 
+---------------+------------+-----------+--------+------------------+
| Tobacco Use   | Types      | Packs/Day | Years  | Date             |
|               |            |           | Used   |                  |
+---------------+------------+-----------+--------+------------------+
| Former Smoker | Cigarettes | 1         | 30     | Quit: 2004 |
+---------------+------------+-----------+--------+------------------+
 
 
 
+---------------------+---+---+---+
| Smokeless Tobacco:  |   |   |   |
| Never Used          |   |   |   |
+---------------------+---+---+---+
 
 
 
+------------------------------+
| Comments: quite smoking  |
+------------------------------+
 
 
 
+-------------+-----------+---------+----------+
| Alcohol Use | Drinks/We | oz/Week | Comments |
|             | ek        |         |          |
+-------------+-----------+---------+----------+
| No          |           |         |          |
+-------------+-----------+---------+----------+
 
 
 
+------------------+---------------+
| Sex Assigned at  | Date Recorded |
| Birth            |               |
+------------------+---------------+
| Not on file      |               |
+------------------+---------------+
 as of this encounter
 
 Last Filed Vital Signs
 
 
+-------------------+----------------------+-------------------------+
| Vital Sign        | Reading              | Time Taken              |
+-------------------+----------------------+-------------------------+
| Blood Pressure    | 120/56               | 2019 11:40 AM PST |
+-------------------+----------------------+-------------------------+
| Pulse             | 64                   | 2019 11:40 AM PST |
+-------------------+----------------------+-------------------------+
| Temperature       | 36.4   C (97.5   F)  | 2019 11:40 AM PST |
+-------------------+----------------------+-------------------------+
| Respiratory Rate  | 18                   | 2019 11:40 AM PST |
 
+-------------------+----------------------+-------------------------+
| Oxygen Saturation | 97%                  | 2019 11:40 AM PST |
+-------------------+----------------------+-------------------------+
| Inhaled Oxygen    | -                    | -                       |
| Concentration     |                      |                         |
+-------------------+----------------------+-------------------------+
| Weight            | 65.5 kg (144 lb 6.4  | 2019  1:11 PM PST |
|                   | oz)                  |                         |
+-------------------+----------------------+-------------------------+
| Height            | 177.8 cm (5' 10")    | 2019  7:11 PM PST |
+-------------------+----------------------+-------------------------+
| Body Mass Index   | 20.72                | 2019  1:11 PM PST |
+-------------------+----------------------+-------------------------+
 in this encounter
 
 Discharge Summaries
 Prudence Nicholson MD-R2 - 2019  1:04 PM PSTFormatting of this note may be different fr
om the original.
 [facility]
 Service:  Hospitalist
 Resident Discharge Summary
 
 Date of Admission:  2019
 Date of Discharge:  
 
 Discharge Provider:  Prudence Nicholson MD-R2
 Treatment Team: 
 Consulting Physician: Srinivasan Jordan DPM
 Consulting Physician: Ethan Awad MD
 Admitting Provider: Luiza Rosales MD
 Discharge Diagnoses:   
 
 Principal Problem:
   PVD (peripheral vascular disease) ([facility])
 Active Problems:
   ESRD (end stage renal disease) (HCC)
   Type 2 diabetes mellitus with chronic kidney disease on chronic dialysis, with long-term 
current use of insulin (HCC)
   Anemia in ESRD (end-stage renal disease) (HCC)
   Hypertension, essential
   Chronic systolic congestive heart failure (HCC)
   COPD (chronic obstructive pulmonary disease) (HCC)
   Paroxysmal atrial fibrillation (HCC)
   CAD (coronary artery disease)
 Resolved Problems:
   * No resolved hospital problems. *
 
 BRIEF HISTORY OF PRESENTATION:  
      Patient Summary:  Per Dr. Rosales's H and P from 2019: '69 y/o F with h/o ESRD
 on HD T,,Sat (Dr. Asher), DM2, PAD s/p angioplasty of LLE and transmetatarsal amputation 
of left foot 18 by Dr. Jordan due to osteomyelitis, CAD, s/p MI, CABG, AVR , HFrE
F with last EF 20 to 25%, s/p AICD, AF on chronic anticoagulation who was sent to ED by Dr. Elliott for evaluation of right LE. Patient reports that at HD yesterday it was noted that he
r right toes were red and dusky. She went to see Dr. Elliott this morning and he was concerne
d that right toes could be ischemic or infected and referred to ED. '
 
 HOSPITAL COURSE:  
 Vascular surgery was consulted from the emergency department, they did a selective catheter
ization of the left common femoral artery and placed an SMA stent. The patient tolerated the
 procedure well. Podiatry was consulted, they recommended wound care consultation for the op
 
en wound on her left foot. They also stated that her right foot did not need a procedure for
 the toes at this time. Infectious disease is waiting for blood cultures and Gram stain and 
culture of the wound site to narrow her IV antibiotics. She reported improved pain, no short
ness of breath, no nausea and vomiting, she is making a small amount of urine and had a willie
l movement today. She would like to go home. Physical therapy cleared her to go home with [facility] assist. Nephrology was consulted and reported that they were amenable to administering he
r IV antibiotics with dialysis. Infectious disease was consulted and agreed to this plan. Up
on discharge the patient was eating, drinking, urinating, having bowel movements.she was not
 having fevers, nausea, or vomiting. 
 
 No prescriptions prior to admission. 
 
 DISCHARGE EXAM
 Vital Signs:
 /56 (BP Location: Right upper arm)  | Pulse 64  | Temp 97.5 F (36.4 C) (Axillary)
  | Resp 18  | Ht 1.778 m (5' 10")  | Wt 65.5 kg (144 lb 6.4 oz)  | SpO2 97%  | Breastfeedin
g? No  | BMI 20.72 kg/m 
 
 Physical Exam
 General Appearance: Alert and cooperative, sitting up in a chair by the bed and appears to 
be in no acute distress. 
 
 HEENNT: Normocephalic and atraumatic. Hearing grossly intact. No nasal discharge. No trache
al deviation. 
 
 Cardiovascular: Rhythm and rate are regular. 2/6 systolic murmur heard best over the left u
pper sternal border. No gallops or rubs appreciated. Peripheral pulses 2+ on the right. No l
ower extremity edema.
 
 Pulmonary/Chest: Lungs are clear to auscultation bilaterally. Effort is normal. Normal jadon
th sounds. 
 
 Abdominal: Soft, nontender, nondistended. Normoactive bowel sounds. No guarding or rebound 
tenderness. 
 
 Musculoskeletal: Normal range of motion. Normal muscular development. Patient with forefoot
 amputation on the left. Left foot wrapped in new dressing, C/D/I. Right foot with erythema 
over the great big toe and cyanosis over the second toe, improved from prior. 
 
 Neurological: CN II-XII grossly intact. Oriented to person, place, and time. 
 
 Skin: Skin is warm and dry. No rash noted. 
 
 Psychiatric:The patient was able to demonstrate good judgement and reason, without hallucin
ations, abnormal affect or abnormal behaviors during the examination. 
 
 DATA
 
 Recent Labs
 Lab 1938 19
 1543 
 WBC 3.90 3.39* 5.53 
 HGB 9.8* 9.5* 9.4* 
 HCT 30.1* 29.2* 28.4* 
 * 125* 147* 
 NEUTOPHILPCT  --  66.95  --  
 MONOPCT  --  11.96  --  
 
 
 Recent Labs
 Lab 19
 0538 19
 0453 19
 1543 
  140 139 
 K 3.9 4.2 4.0 
 CL 97* 101 100 
 CO2 31 28 29 
 BUN 13 26* 23 
 CREATININE 4.8* 6.8* 6.1* 
 PROT  --   --  6.2* 
 BILITOT  --   --  0.4 
 ALT  --   --  21 
 AST  --   --  24 
 
 Phosphorus:  
 Lab Results 
 Component Value Date 
  PHOS 3.6 2019 
 
 Invalid input(s): LABALBU
 
 Recent Labs
 Lab 19
 0538 
 MG 2.3 
 
 Recent Labs
 Lab 19
 1543 
 CKTOTAL 28* 
 
 Intake/Output Summary (Last 24 hours) at 19 1717
 Last data filed at 19 0850
  Gross per 24 hour 
 Intake              380 ml 
 Output                0 ml 
 Net              380 ml 
 
 Microbiology: Blood cultures - NGTD
 
 Radiology
 Us Lower Extremty  Arterial Right
 
 Result Date: 2019
 1. Right leg shows biphasic inflow with a greater than 50% stenosis at the origin of the dobbins
perficial femoral artery (137309).  Biphasic outflow. 2. Two vessel runoff to the right jeanne
t. Signed by: Eugenio Hoffman Sign Date/Time: 2019 3:11 PM
 
 PLAN
 
 Disposition:  Home
 Condition:  Stable
 Code Status:  Full Code
 
 No discharge procedures on file.
 
 Follow up:
 [facility]
 
 [address] [facility] [id number] [phone]
 Follow up in 3 week(s)
 
 Ivy Couch, DO
 [address], [address]
 [facility] [address]
 [phone]
 
 Ivy Couch, DO
 [address]
 [facility] [address]
 [phone]
 
 In 1 week
 
 Andrés Elliott MD
 [address] [facility] [id number] [phone]
 
 Call office for appointment
 
  
 Medication List 
  
 START taking these medications  
 cefTAZidime 2 g injection
 QTY:  1 each
 Refills:  0
 Commonly known as:  FORTAZ
 Inject 2 g into the muscle After Hemodialysis (see admin instructions) for 7 days.
  
 vancomycin IVPB
 Refills:  0
 Commonly known as:  VANCOCIN
 Inject 750 mg into the vein After Hemodialysis (see admin instructions) for 7 days. Dilute 
in appropriate volume of IV fluid and give by slow IV infusion.
  
  
 CHANGE how you take these medications  
 losartan 25 MG tablet
 QTY:  30 tablet
 Refills:  0
 Commonly known as:  COZAAR
 Take 1 tablet by mouth daily.
 What changed:  how much to take
  
  
 CONTINUE taking these medications  
 * albuterol 108 (90 Base) MCG/ACT inhaler
 Refills:  0
 Commonly known as:  PROVENTIL HFA;VENTOLIN HFA
  
 * VENTOLIN  (90 Base) MCG/ACT inhaler
 Refills:  0
 Generic drug:  albuterol
  
 apixaban 2.5 MG tablet
 
 QTY:  60 tablet
 Refills:  0
 Commonly known as:  ELIQUIS
 Take 1 tablet by mouth 2 (two) times daily.
  
 atorvastatin 40 MG tablet
 QTY:  30 tablet
 Refills:  0
 Commonly known as:  LIPITOR
 Take 1 tablet by mouth nightly.
  
 bisacodyl 10 MG suppository
 Refills:  0
 Commonly known as:  DULCOLAX
  
 calcium acetate 667 MG capsule
 Refills:  0
 Commonly known as:  PHOSLO
  
 carvedilol 12.5 MG tablet
 QTY:  60 tablet
 Refills:  0
 Doctor's comments:  Hold for SBP less than 100
 Hold for HR less than 60
 Commonly known as:  COREG
 Take 1 tablet by mouth 2 (two) times daily with meals.
  
 clopidogrel 75 MG tablet
 QTY:  30 tablet
 Refills:  11
 Commonly known as:  PLAVIX
 Take 1 tablet by mouth daily.
  
 esomeprazole 20 MG capsule
 Refills:  0
 Commonly known as:  NEXIUM
  
 HYDROcodone-acetaminophen 5-325 MG per tablet
 QTY:  30 tablet
 Refills:  0
 Commonly known as:  NORCO
 Take 1 tablet by mouth every 4 (four) hours as needed.
  
 insulin aspart 100 UNIT/ML injection
 Refills:  0
 Commonly known as:  NOVOLOG
  
 insulin degludec 100 UNIT/ML injection
 Refills:  0
 Commonly known as:  TRESIBA
  
 isosorbide mononitrate 60 MG 24 hr tablet
 QTY:  30 tablet
 Refills:  1
 Take 1 tablet by mouth daily.
  
 loperamide 2 MG capsule
 Refills:  0
 Commonly known as:  IMODIUM
  
 
 LORazepam 1 MG tablet
 QTY:  30 tablet
 Refills:  0
 Commonly known as:  ATIVAN
 Take 1 tablet by mouth every 8 (eight) hours as needed for Anxiety.
  
 nitroGLYCERIN 0.4 MG SL tablet
 QTY:  30 tablet
 Refills:  0
 Doctor's comments:  Hold for SBP less than 100
 Commonly known as:  NITROSTAT
 Place 1 tablet under the tongue every 5 (five) minutes as needed for Chest pain.
  
 nortriptyline 25 MG capsule
 Refills:  0
 Commonly known as:  PAMELOR
  
 ondansetron 4 MG disintegrating tablet
 Refills:  0
 Commonly known as:  ZOFRAN-ODT
  
 oxybutynin 5 MG tablet
 Refills:  0
 Commonly known as:  DITROPAN
  
 prochlorperazine 5 MG tablet
 QTY:  60 tablet
 Refills:  11
 Doctor's comments:  Please consider 90 day supplies to promote better adherence
 TAKE ONE TABLET BY MOUTH TWICE DAILY AS NEEDED FOR NAUSEA
  
 ranitidine 150 MG tablet
 Refills:  0
 Commonly known as:  ZANTAC
  
 rOPINIRole 0.25 MG tablet
 Refills:  0
 Commonly known as:  REQUIP
  
 SLOW-MAG 71.5-119 MG Tbec
 Refills:  0
 Generic drug:  Magnesium Cl-Calcium Carbonate
  
 Vitamin D3 5000 units Caps
 Refills:  0
  
 Vortioxetine HBr 10 MG Tabs
 Refills:  0
  
  
 * This list has 2 medication(s) that are the same as other medications prescribed for you. 
Read the directions carefully, and ask your doctor or other care provider to review them wit
h you. 
  
  
  
 You might also be taking other medications not listed above. If you have questions about an
y of your other medications, talk to the person who prescribed them or your Primary Care Pro
vider. 
  
 
  
  
 STOP taking these medications  
 aspirin 81 MG EC tablet
  
  
  
 Where to Get Your Medications 
  
 Information about where to get these medications is not yet available  
 Ask your nurse or doctor about these medications
  cefTAZidime 2 g injection
  vancomycin IVPB
  
 
 Prudence Nicholson MD-R2
 2019
 5:17 PM
 
 
 Associated attestation - Moiz Josue MD - 2019  5:12 PM PSTPatient seen an
d examined along with residents prior to discharge. Discussed with Dr. Elliott and  Dr. Asher. 
Patient will receive IV antibiotics ceftazidime and vancomycin with each HD. She insists on 
going home and is cleared for discharge by Dr. Asher and Dr. Elliott. 
 I agree with the discharge summary of Dr. Nicholson. in this encounter
 
 Discharge Instructions
 Nesha Vanegas RN - 2019Formatting of this note may be different from the original.
 
 Peripheral Angiography
 Peripheral angiography is an outpatient procedure that makes a   map  of the vessels (ar
teries) in your lower body, legs, and arms, using X-ray and dye.This map can show where bloo
d flow may be blocked.
 Talk with your healthcare provider about the risks and complications of angiography. 
 Before the procedure
 Prepare for the peripheral angiography as follows:
  Tell your healthcare provider about all medicines you take and any allergies you may hav
e.
  Follow any directions you  re given for not eating or drinking before the procedure. If
 your provider says to take your normal medicines, swallow them with only small sips of wate
r.
  Arrange for a family member or friend to drive you home.
 During the procedure
 Here is what to expect:
  
 You may get medicine through an IV (intravenous) line to relax you. You  re given an injec
tion to numb the insertion site. Then, a tiny skin cut (incision) is made near an artery in 
your groin.
  Your provider inserts a thin tube (catheter) through the incision. He or she then thread
s the catheter into an artery while looking at a video monitor.
  Contrast   dye  is injected into the catheter to confirm position. You may feel warmt
h or pressure in your legs and back. You lie still as X-rays are taken. The catheter is then
 taken out.
 After the procedure
 You  ll be taken to a recovery area. A healthcare provider will apply pressure to the site
 for about 10 minutes. Your healthcare provider will tell you how long to lie down and keep 
the insertion site still.Your healthcare provider will discuss the results with you soon a
fter the procedure.
 Back at home
 On the day you get home, don  t drive, don  t exercise, avoid walking and taking stairs, 
 
and avoid bending and lifting. Your healthcare provider may give you other care instructions
.
 Call your healthcare provider
 Call your healthcare provider right away if:
  You notice a lump or bleeding at the insertion site
  You feel pain at the insertion site
  You become lightheaded or dizzy
  You have leg pain or numbness
  You do not urinate in 8 hours 
 Date Last Reviewed: 2016-2018 [employer]. [address] [place] [address]. All righ
ts reserved. This information is not intended as a substitute for professional medical care.
 Always follow your healthcare professional's instructions.
 
 Peripheral Arterial Angioplasty and Stent
 
 Peripheral arterial angioplasty is a procedure done to treat a narrowed or blocked artery i
n your arm or leg. This brings blood flow back to your arm or leg. It also helps ease sympto
ms. Sometimes a metal mesh tube called a stent may be put in your artery. This holds your ar
dirk open. The procedure is done by a specially trained doctor called an interventional radi
ologist. This sylvia doctor trained and certified by the American Board of Radiology to use m
inimally invasive image-guided procedures to diagnose and treat diseases.
 Before your procedure
 Follow any instructions you are given on how to get ready. This can include following any d
irections you  re given for not eating or drinking before the procedure.
 Tell your healthcare provider if you:
  Are allergic to X-ray dye (contrast medium) or other medicines
  Are breastfeeding
  Are pregnant or think you may be pregnant
  Have had any recent illnesses
  Have any medical conditions
 Tell your healthcare provider about any medicines you are taking. You may need to stop taki
ng all or some of these before the procedure. This includes:
  All prescription medicines
  Herbs, vitamins, and other supplements
  Over-the-counter medicines such as aspirin or ibuprofen
  Street drugs
 During your procedure
  An IV line is put into your vein. This is to give you fluids and medicines. You may be g
iven medicine to help you relax and make you sleepy (sedation). Medicine will be put on your
 skin where the cut (incision) will be done. This is to keep you from feeling pain at the si
te.
  A very small incision is made at the insertion site. A thin, flexible tube (catheter) is
 put through the incision into your artery. The radiologist watches the catheter  s movemen
t on a screen.
  X-ray dye is injected through the catheter into your artery. This helps to see the arter
y more clearly on the X-rays. The radiologist uses these images as a guide. He or she moves 
the catheter to the narrowed or blocked part of your artery.
  When the catheter reaches the narrowed or blocked area, the radiologist inflates a speci
al balloon attached to the catheter. This is called angioplasty. Inflating the balloon widen
s the passage through the artery.
  Sometimes the artery won't stay open after the angioplasty. In this case, a stent is nee
ded. A catheter with a stent attached is threaded through the artery. When the stent is in t
he right place, it is opened.
  When the procedure is done, all catheters and balloons are removed. The stent stays in p
lace. Pressure is put on the insertion site for 15 minutes to stop bleeding.
 After your procedure
  Your healthcare provider will tell you how long to lie down and keep the insertion site 
still.
  You may stay in the hospital for a few hours or overnight.
 
  Drink plenty of fluids to help flush the X-ray dye from your body.
  After you go home, care for the insertion site as directed.
  You may need to take aspirin or a blood-thinning medicine (anticoagulant) after the proc
edure. This is to help prevent blood clots in the stent. Talk with your healthcare provider 
about this.
 Possible risks and complications
 All procedures have some risk. Possible risks of peripheral arterial angioplasty and stent 
include:
  Bleeding or infection at the catheter insertion site
  Damage to the artery, including worsening of the blockage
  Problems because of X-ray dye, these include allergic reaction or kidney damage
  Artery becomes blocked again (restenosis), which often happens within 6 to 18 months
  Low-level exposure to X-ray radiation, which is considered a safe level 
 Date Last Reviewed: 2017-2018 [employer]. [address] [place] [address], [place] [id number]. All righ
ts reserved. This information is not intended as a substitute for professional medical care.
 Always follow your healthcare professional's instructions.
 
 I certify that Cherie Villalobos  is under my care and that I had a face to face encounter on 
19  that meets the physician face to face encounter requirements.  I certify that based
 on my findings , the patient is in need of intermittent skilled services and a plan for fur
nishing these services to include, but not limited to the services listed in the questions b
elow:  
 Primary diagnosis for home health: PVD: wound care/ foot infection
 Additional diagnosis: 
 Services needed: wound care for right and left foot
 Patient is homebound because he/she cannot leave home without assistance of another individ
ual and leaving the home requires a considerable and taxing effort. Patient needs the assist
ance of another individual to leave home because:  Limited mobiltiy, fall risk, pain with mo
[facility]
 Physician assuming care for Home Health services: Ivy Couch
 
 in this encounter
 
 Medications at Time of Discharge
 
 
+----------------------+----------------------+----------+---------+----------+-----------+
| Medication           | Sig.                 | Disp.    | Refills | Start    | End Date  |
|                      |                      |          |         | Date     |           |
+----------------------+----------------------+----------+---------+----------+-----------+
|   albuterol          | Inhale 2 puffs into  |          |         |          |           |
| (PROVENTIL           | the lungs every 4    |          |         |          |           |
| HFA;VENTOLIN HFA)    | (four) hours as      |          |         |          |           |
| 108 (90 Base)        | needed for Wheezing. |          |         |          |           |
| MCG/ACT              |                      |          |         |          |           |
| inhalerIndications:  |                      |          |         |          |           |
| states uses 2x       |                      |          |         |          |           |
| monthly average      |                      |          |         |          |           |
+----------------------+----------------------+----------+---------+----------+-----------+
|   esomeprazole       | Take 1 capsule by    |          |         |          |           |
| (NEXIUM) 40 MG       | mouth every day      |          |         |          |           |
| capsule              |                      |          |         |          |           |
+----------------------+----------------------+----------+---------+----------+-----------+
|   insulin aspart     | Inject  into the     |          |         |          |           |
| (NOVOLOG) 100        | skin 3 (three) times |          |         |          |           |
| UNIT/ML injection    |  daily before meals. |          |         |          |           |
|                      |  Sliding scale       |          |         |          |           |
+----------------------+----------------------+----------+---------+----------+-----------+
|   isosorbide         | Take 1 tablet by     |   30     | 1       | 20 |  |
 
| mononitrate (IMDUR)  | mouth daily.         | tablet   |         | 18       | 9         |
| 60 MG 24 hr tablet   |                      |          |         |          |           |
+----------------------+----------------------+----------+---------+----------+-----------+
|   nortriptyline      | Take 25-75 mg by     |          |         |          |           |
| (PAMELOR) 25 MG      | mouth See Admin      |          |         |          |           |
| capsule              | Instructions. Takes  |          |         |          |           |
|                      | 25 mg by mouth every |          |         |          |           |
|                      |  morning and 75 mg   |          |         |          |           |
|                      | every night          |          |         |          |           |
+----------------------+----------------------+----------+---------+----------+-----------+
|   ondansetron        | Take 4 mg by mouth   |          |         | 20 |           |
| (ZOFRAN-ODT) 4 MG    | every 8 (eight)      |          |         | 18       |           |
| disintegrating       | hours as needed.     |          |         |          |           |
| tablet               |                      |          |         |          |           |
+----------------------+----------------------+----------+---------+----------+-----------+
|   oxybutynin         | Take 7.5 mg by mouth |          |         |          |           |
| (DITROPAN) 5 MG      |  2 (two) times       |          |         |          |           |
| tablet               | daily.               |          |         |          |           |
+----------------------+----------------------+----------+---------+----------+-----------+
|   rOPINIRole         | Take 0.75 mg by      |          |         |          |           |
| (REQUIP) 0.25 MG     | mouth nightly.       |          |         |          |           |
| tablet               |                      |          |         |          |           |
+----------------------+----------------------+----------+---------+----------+-----------+
|   apixaban (ELIQUIS) | Take 1 tablet by     |   60     | 0       | 20 |           |
|  2.5 MG tablet       | mouth 2 (two) times  | tablet   |         | 18       |           |
|                      | daily.               |          |         |          |           |
+----------------------+----------------------+----------+---------+----------+-----------+
|   carvedilol (COREG) | Take 1 tablet by     |   60     | 0       | 20 |  |
|  12.5 MG tablet      | mouth 2 (two) times  | tablet   |         | 19       | 0         |
|                      | daily with meals.    |          |         |          |           |
+----------------------+----------------------+----------+---------+----------+-----------+
|                      | Take 1 tablet by     |   30     | 0       | 20 |           |
| HYDROcodone-acetamin | mouth every 4 (four) | tablet   |         | 19       |           |
| ophen (NORCO) 5-325  |  hours as needed.    |          |         |          |           |
| MG per tablet        |                      |          |         |          |           |
+----------------------+----------------------+----------+---------+----------+-----------+
|   insulin degludec   | Inject 21 units      |          |         |          |           |
| (TRESIBA) 100        | every night          |          |         |          |           |
| UNIT/ML injection    |                      |          |         |          |           |
+----------------------+----------------------+----------+---------+----------+-----------+
|   losartan (COZAAR)  | Take 100 mg by mouth |          |         | 20 |           |
| 100 MG tablet        |  daily.              |          |         | 19       |           |
+----------------------+----------------------+----------+---------+----------+-----------+
|   nitroGLYCERIN      | Place 1 tablet under |   30     | 0       | 20 |  |
| (NITROSTAT) 0.4 MG   |  the tongue every 5  | tablet   |         | 19       | 0         |
| SL tablet            | (five) minutes as    |          |         |          |           |
|                      | needed for Chest     |          |         |          |           |
|                      | pain.                |          |         |          |           |
+----------------------+----------------------+----------+---------+----------+-----------+
|   prochlorperazine 5 | TAKE ONE TABLET BY   |   60     | 11      | 20 |           |
|  MG tablet           | MOUTH TWICE DAILY AS | tablet   |         | 19       |           |
|                      |  NEEDED FOR NAUSEA   |          |         |          |           |
+----------------------+----------------------+----------+---------+----------+-----------+
|   TRINTELLIX 20 MG   | Take 20 mg by mouth  |          |         | 20 |           |
| TABS                 | every evening.       |          |         | 19       |           |
+----------------------+----------------------+----------+---------+----------+-----------+
|   calcium acetate    | 667 mg 3 (three)     |          |         | 20 |  |
| (PHOSLO) 667 MG      | times daily with     |          |         | 16       | 9         |
| capsule              | meals.               |          |         |          |           |
+----------------------+----------------------+----------+---------+----------+-----------+
 
|   loperamide         | 2 mg as needed.      |          |         |          |  |
| (IMODIUM) 2 MG       |                      |          |         |          | 9         |
| capsule              |                      |          |         |          |           |
+----------------------+----------------------+----------+---------+----------+-----------+
|   Vortioxetine HBr   | 10 mg nightly.       |          |         |          |  |
| 10 MG TABS           |                      |          |         |          | 9         |
+----------------------+----------------------+----------+---------+----------+-----------+
|   cefTAZidime        | Inject 2 g into the  |   1 each |         | 20 |  |
| (FORTAZ) 2 g         | muscle After         |          |         | 19       | 9         |
| injection            | Hemodialysis (see    |          |         |          |           |
|                      | admin instructions)  |          |         |          |           |
|                      | for 7 days.          |          |         |          |           |
+----------------------+----------------------+----------+---------+----------+-----------+
|   vancomycin         | Inject 750 mg into   |          | 0       | 20 |  |
| (VANCOCIN) IVPB      | the vein After       |          |         | 19       | 9         |
|                      | Hemodialysis (see    |          |         |          |           |
|                      | admin instructions)  |          |         |          |           |
|                      | for 7 days. Dilute   |          |         |          |           |
|                      | in appropriate       |          |         |          |           |
|                      | volume of IV fluid   |          |         |          |           |
|                      | and give by slow IV  |          |         |          |           |
|                      | infusion.            |          |         |          |           |
+----------------------+----------------------+----------+---------+----------+-----------+
|   albuterol          | Ventolin HFA 90      |          |         |          |  |
| (VENTOLIN HFA) 108   | mcg/actuation        |          |         |          | 9         |
| (90 Base) MCG/ACT    | aerosol inhaler      |          |         |          |           |
| inhaler              | Inhale 2 puffs every |          |         |          |           |
|                      |  4 hours by          |          |         |          |           |
|                      | inhalation route as  |          |         |          |           |
|                      | needed.              |          |         |          |           |
+----------------------+----------------------+----------+---------+----------+-----------+
|   atorvastatin       | Take 1 tablet by     |   30     | 0       | 20 |  |
| (LIPITOR) 40 MG      | mouth nightly.       | tablet   |         | 19       | 9         |
| tablet               |                      |          |         |          |           |
+----------------------+----------------------+----------+---------+----------+-----------+
|   bisacodyl          | Place 10 mg          |          |         |          |  |
| (DULCOLAX) 10 MG     | rectally.            |          |         |          | 9         |
| suppository          |                      |          |         |          |           |
+----------------------+----------------------+----------+---------+----------+-----------+
|   Cholecalciferol    | Take 5,000 capsules  |          |         |          |  |
| (VITAMIN D3) 5000    | by mouth every other |          |         |          | 9         |
| UNITS CAPS           |  day.                |          |         |          |           |
+----------------------+----------------------+----------+---------+----------+-----------+
|   clopidogrel        | Take 1 tablet by     |   30     | 11      | 20 |  |
| (PLAVIX) 75 MG       | mouth daily.         | tablet   |         | 18       | 9         |
| tablet               |                      |          |         |          |           |
+----------------------+----------------------+----------+---------+----------+-----------+
|   LORazepam (ATIVAN) | Take 1 tablet by     |   30     | 0       | 20 | 02/15/201 |
|  1 MG tablet         | mouth every 8        | tablet   |         | 19       | 9         |
|                      | (eight) hours as     |          |         |          |           |
|                      | needed for Anxiety.  |          |         |          |           |
+----------------------+----------------------+----------+---------+----------+-----------+
|   Magnesium          | Take 1 tablet by     |          |         |          |  |
| Cl-Calcium Carbonate | mouth every other    |          |         |          | 9         |
|  (SLOW-MAG) 71.5-119 | day.                 |          |         |          |           |
|  MG TBEC             |                      |          |         |          |           |
+----------------------+----------------------+----------+---------+----------+-----------+
|   ranitidine         | ranitidine 150 mg    |          |         |          |  |
| (ZANTAC) 150 MG      | tablet Take 1 tablet |          |         |          | 9         |
| tablet               |  twice a day by oral |          |         |          |           |
 
|                      |  route.              |          |         |          |           |
+----------------------+----------------------+----------+---------+----------+-----------+
 as of this encounter
 
 Progress Notes
 Andrés Elliott MD - 2019  7:16 AM PSTFormatting of this note may be different from the 
original.
 
 
 CONSULT NOTE
 2019
 7:16 AM
 
 PRIMARY PHYSICIAN
 Ivy Couch
 
 CONSULT REQUEST FROM
 Moiz Josue MD
 
 HISTORY OF PRESENT ILLNESS
 The patient is a 68 y.o.-year-old female  with history of ESRD on HD via fistula Tue/Thu/Sa
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
 CABG. Recent LLE angioplasty She had been on antibiotics for L foot first digit infection w
ith possible osteomyelitis since November (Oral levofloxacin and clindamycin then transition
ed to IV cefepime with HD until 18, no improvement noted on antibiotics). She had follo
wed with podiatrist and underwent L TMA on 18, intraoperative findings encountered pur
ulence at first metatarsal.
 
 She was Admitted on 18 to [facility] and had resection of the toe.  discha
rged on 19She has problem with tremors on the hands And on and off confusion. Ct head ne
k[place]all on 18 EEG done show no seizure just diffuse slowing .
 The patient has some nausea and on and off chest pain stable. The patient felt better was
 discharged on 2019 
 
 readmitted on 19 and discharged on 19 due to chest pain and noted to be stable.
 
 
 Wound culture shows skin sherwin.She had 20days off iv vancomycin and 16 days of ceftazidime 
Had 4 days of cefepime.Orignally was to complete to iv vancmycin 500 mg iv and ceftazidime 2
 gm iv after each hemodialysis unitluntil 18 for the infected foot. Tbut was stopped as
 the line of resection was free of infecton . And off antibiotics since 19
 
 
 The patient developed new right foot lesion which occurred between  until 2019. Patient has memory problems does not know what happened but the right foot is swo
llen and red and tender will need to be taken care of as soon as possible cultures will be d
one. We will have MRSA screen CBC CMP ESR CRP blood culture  2
 
 cultures of the right foot done and right ankle done
 
 will have patient go to [facility] ER for evaluation of ischemic right foot versus severe infec
tion of the right fourth toe.
 
 Wound examination right leg with 21 x 5 cm abrasions ankle with open wound big toe right fo
ot with circumference of 10 cm with blister of 0.5 x 0.2 cm with redness of 5 cm. Second toe
 of the right foot with pallor of 4 x 2 cm.Left foot with amputation site healing well 11 x 
1 cm
 
 I have spoken to Dr. Srinivasan Jordan podiatrist will see her when the patient is admitted to 
[facility].
 
 
 Patient will go to the emergency room to see Dr. Saenz whom have coordinated her to be see
n there will have arterial studies of the right leg is concern for ischemic will be on vanco
mycin, ceftazidme
 
 And metronidazole. 
 ADmitted to [facility] on 19
 Due to concern with Right footinfection.
 infection, thus an infectious disease (ID) consult done for further
 evaluation and management.
 Day 3 of vancomycin, metronidazole and ceftazidime
 S/p  Ultrasound guided access of left common femoral artery
 Aortogram SElective catheterization of right common femoral artery with right leg runoff.Ri
ght SFA stenting using 6x80 mm self expanding stent
 Drug eluting balloon angioplasty of right SFA using 4x100 mm Lutonix balloon
 The patient feeling better today 
 Pending culture no growth will complete one more week o
 Ceftazidime and vancomycin 
 wound care with silver sulfadiazine cream. 
 Past Medical History 
 Diagnosis Date 
   Acute MI ([facility])  
  with stenting x 1 
   Anemia associated with chronic renal failure 2016 
   Atrial fibrillation ([facility])  
   Chronic kidney disease  
   Congestive heart failure ([facility])  
   COPD (chronic obstructive pulmonary disease) ([facility])  
   COPD (chronic obstructive pulmonary disease) ([facility]) 2018 
   Coronary artery disease  
  stent placements: 3 total 
   Depression  
   Diabetes other 
  abstracted 
   Diabetes mellitus, type 2 ([facility])  
   ESRD on peritoneal dialysis ([facility])  
   GERD (gastroesophageal reflux disease)  
   Hemodialysis patient ([facility]) 10/2016 
  Stopped peritoneal and restarted hemodialysis 
   Hemorrhage of gastrointestinal tract, unspecified 2017 
  low GI, rectal bleeding 
   High blood pressure other 
  abstracted 
   High cholesterol other 
  abstracted 
   Hyperlipidemia  
   Hypertension  
   Hyponatremia 2017 
   Myocardial infarction ([facility]) 2017 
   Other chronic pain  
  feet,  
   Pain of left hip joint 2016 
  due to hip fracture and replacement 
   Partial small bowel obstruction ([facility]) 2017 
  resolved 
   Renal failure  
  Stage 5.   Fistula in the JAN - not on dialysis yet. 
   Unspecified visual disturbance  
  glasses 
 
 
 Past Surgical History 
 Procedure Laterality Date 
   AV FISTULA PLACEMENT   
  left upper arm AV fistula - failed 
   AV FISTULA REPAIR N/A 10/25/2016 
  Procedure: AV FISTULA - GRAFT REPAIR/REVISION;  Surgeon: Kirt Mclaughlin MD;  Location: [facility]
IN OR;  Service: Vascular;  Laterality: N/A;  Superficialization and revision of left arm fi
stula 
   CARDIAC CATHETERIZATION  2017 
   CARPAL TUNNEL RELEASE Bilateral other 
  abstracted 
   CATARACT EXTRACTION Bilateral  
   CATHETER REMOVAL N/A 2016 
  Procedure: DIALYSIS CATHETER - REMOVAL;  Surgeon: Kirt Mclaughlin MD;  Location: [facility] OR; 
 Service: Vascular;  Laterality: N/A;  PD cath removal 
   CHOLECYSTECTOMY  other 
  abstracted 
   COLONOSCOPY   
   CORONARY ARTERY BYPASS GRAFT   
   CORONARY STENT PLACEMENT  2017 
  Drug Elutin stents to OM, 1 stent to prox. LAD 
   EYE SURGERY   
   FOOT AMPUTATION Left 2018 
  Procedure: FOREFOOT - AMPUTATION;  Surgeon: Srinivasan Jordan DPM;  Location: [facility] MAIN OR;
  Service: Podiatry;  Laterality: Left; 
   HARDWARE PRESENT   
  stent placements x 3, Left hip replacement, vascular stents 2 in left leg 
   HIP ARTHROPLASTY Left 2016 
  Procedure: HIP - ARTHROPLASTY(ALLISON);  Surgeon: Uriel Roa MD;  Location: [facility] MAIN 
OR;  Service: Orthopedics;  Laterality: Left; 
   HYSTERECTOMY  1998 
  complete 
   PACEMAKER INSERTION   
  [employer] pacemaker/defib 
   PERITONEAL CATHETER INSERTION  8/15/2013 
   PERITONEAL CATHETER INSERTION N/A 2016 
  Procedure: LAPAROSCOPIC - PERITONEAL DIALYSIS CATH INSERTION;  Surgeon: Kirt Mclaughlin MD;  Lo
cation: [facility] MAIN OR;  Service: Vascular;  Laterality: N/A;  Removal of old catheter 
   SHOULDER SURGERY Right  
  frozen shoulder 
   UNLISTED PROCEDURE ARTHROSCOPY   
  total Left hip 
   vascular stents Left 2017 
  leg (2 stents) 
   VASCULAR SURGERY   
 
 Current Facility-Administered Medications 
 Medication Dose Route Frequency Provider Last Rate Last Dose 
   acetaminophen (TYLENOL) tablet 650 mg  650 mg Oral Q6H PRN Luiza Rosales MD     
  Or 
   acetaminophen (TYLENOL) suppository 650 mg  650 mg Rectal Q6H PRN Luiza Rosales MD
     
   albuterol (PROVENTIL HFA;VENTOLIN HFA) inhaler  2 puff Inhalation Q4H PRN Luiza jhaveri MD     
   apixaban (ELIQUIS) tablet 2.5 mg  2.5 mg Oral BID Prudence Nicholson MD-R2   2.5 mg at 2051 
   aspirin EC tablet 81 mg  81 mg Oral Daily with breakfast Kirt Mclaughlin MD   81 mg at  1400 
   atorvastatin (LIPITOR) tablet 40 mg  40 mg Oral Nightly Luiza Rosales MD   40 mg a
t 19 
 
   calcium acetate (PHOSLO) capsule 667 mg  667 mg Oral TID  Luiza Rosales MD   667
 mg at 19 
   carvedilol (COREG) tablet 12.5 mg  12.5 mg Oral BID KAYLEE Rosales MD   12.5 mg 
at 19 
   cefTAZidime (FORTAZ) 2 g in sodium chloride (IV) 0.9 % 50 mL IVPB  2 g Intravenous Afte
r Hemodialysis (see admin instructions) Andrés Elliott MD     
   cholecalciferol (VITAMIN D-3) tablet 5,000 Units  5,000 Units Oral Daily Luiza rivas MD   5,000 Units at 19 1400 
   clopidogrel (PLAVIX) tablet 75 mg  75 mg Oral Daily Kirt Mclaughlin MD   75 mg at 19 1
401 
   dextrose 10 % infusion   Intravenous Continuous PRN Luiza Rosales MD     
   dextrose 50 % solution 12 mL  12 mL Intravenous PRN Luiza Rosales MD     
   dextrose 50 % solution 25 mL  25 mL Intravenous PRN Luiza Rosales MD     
   famotidine (PEPCID) tablet 10 mg  10 mg Oral Daily Luiza Rosales MD   10 mg at  1401 
   fentaNYL (SUBLIMAZE) injection 25 mcg  25 mcg Intravenous Q1H PRN Kirt Mclaughlin MD     
  Or 
   fentaNYL (SUBLIMAZE) injection 50 mcg  50 mcg Intravenous Q1H PRN Kirt Mclaughlin MD     
   glucagon (GLUCAGEN) injection 0.5 mg  0.5 mg Intramuscular PRN Luiza Rosales MD   
  
   glucagon (GLUCAGEN) injection 1 mg  1 mg Intramuscular PRN Luiza Rosales MD     
   HYDROcodone-acetaminophen (NORCO) 5-325 MG per tablet 1 tablet  1 tablet Oral Q4H PRN STEVEN Mclaughlin MD   1 tablet at 19 0626 
  Or 
   HYDROcodone-acetaminophen (NORCO)  MG per tablet 1 tablet  1 tablet Oral Q4H PRN 
Kirt Mclaughlin MD   1 tablet at 19 
   HYDROmorphone (DILAUDID) injection 0.5 mg  0.5 mg Intravenous Q3H PRN Luiza Rosales MD     
  Or 
   HYDROmorphone (DILAUDID) injection 1 mg  1 mg Intravenous Q3H PRN Luiza Rosales MD
     
   insulin glargine (LANTUS) injection 15 Units  15 Units Subcutaneous Nightly Luiza doran MD   15 Units at 19 
   insulin lispro (human) (HUMALOG) injection 0-3 Units  0-3 Units Subcutaneous Nightly 
leonel Rosales MD   1 Units at 19 
   insulin lispro (human) (HUMALOG) injection 0-6 Units  0-6 Units Subcutaneous TID AC She
kelley Rosales MD   1 Units at 19 171 
   isosorbide mononitrate (IMDUR) 24 hr tablet 60 mg  60 mg Oral Daily Luiza Rosales MD   60 mg at 19 1402 
   loperamide (IMODIUM) capsule 2 mg  2 mg Oral 4x Daily PRN Luiza Rosales MD     
   LORazepam (ATIVAN) tablet 1 mg  1 mg Oral Q8H PRN Luiza Rosales MD   1 mg at  0839 
   metroNIDAZOLE (FLAGYL) tablet 500 mg  500 mg Oral 3 times per day Andrés Elliott MD   500 
mg at 19 0557 
   nitroGLYCERIN (NITROSTAT) SL tablet 0.4 mg  0.4 mg Sublingual Q5 Min PRN Luiza rivas MD     
   nortriptyline (PAMELOR) capsule 25 mg  25 mg Oral QAM Kirt Mclaughlin MD   Stopped at  1007 
  And 
   nortriptyline (PAMELOR) capsule 75 mg  75 mg Oral Nightly Kirt Mclaughlin MD   75 mg at  
   ondansetron (ZOFRAN-ODT) disintegrating tablet 4 mg  4 mg Oral Q6H PRN Kirt Mclaughlin MD   
  
  Or 
   ondansetron (ZOFRAN) injection 4 mg  4 mg Intravenous Q6H PRN Kirt Mclaughlin MD   4 mg at 0
19 223 
   oxybutynin (DITROPAN) tablet 2.5 mg  2.5 mg Oral BID Luiza Rosales MD   2.5 mg at 
19 
   pantoprazole (PROTONIX) EC tablet 40 mg  40 mg Oral QAM AC Kirt Mclaughlin MD   40 mg at  0557 
 
   rOPINIRole (REQUIP) tablet 0.75 mg  0.75 mg Oral Nightly Luiza Rosales MD   0.75 m
g at 19 
   sodium chloride (PF) 0.9 % flush 10 mL  10 mL Intravenous Q8H Luiza Rosales MD   1
0 mL at 19 1130 
   vancomycin (VANCOCIN) 500 mg in sodium chloride (IV) 0.9 % 100 mL IVPB  500 mg Intraven
ous See Admin Instructions Beatriz Luciano, [facility]     
   Vortioxetine HBr TABS 10 mg  10 mg Oral Nightly Luiza Rosales MD   Stopped at 01/2
4/19 2200 
   zolpidem (AMBIEN) tablet 5 mg  5 mg Oral Nightly PRN Kirt Mclaughlin MD     
 
 Allergies 
 Allergen Reactions 
   Quinapril Hcl Anaphylaxis 
   Digoxin And Related Other (See Comments) 
   toxic 
   Metaxalone Other (See Comments) 
   Morphine Mental Changes 
   Make her crazy/ "funny feeling in my head"
 Has used hydromorphone in-house 
   Pantoprazole Sodium Nausea and Vomiting 
   Penicillins Rash 
   Quinapril Hcl Edema 
   Facial Edema 
   Sudafed [Pseudoephedrine Hcl] Rash 
 
 Social History 
 
 Social History 
   Marital status:  
   Spouse name: N/A 
   Number of children: N/A 
   Years of education: N/A 
 
 Occupational History 
   Not on file. 
 
 Social History Main Topics 
   Smoking status: Former Smoker 
   Packs/day: 1.00 
   Years: 30.00 
   Types: Cigarettes 
   Quit date: 2004 
   Smokeless tobacco: Never Used 
    Comment: quite smoking  
   Alcohol use No 
   Drug use: No 
   Sexual activity: No 
 
 Other Topics Concern 
   Not on file 
 
 Social History Narrative 
  She lives with . 2 kids. Worked in banking 
 
 No smoking. No alcohol intake. Recreational Drug Use
 No Travel
 NO Pets
 Immunization History 
 Administered Date(s) Administered 
   Hepatitis B Adult 2016 
 
   Influenza, Trivalent W/Preservative 2016 
   Pneumococcal Polysaccharide 23-valent 2012 
  
 
 Family History 
 Problem Relation Age of Onset 
   High cholesterol Father  
   Hypertension Father  
   Diabetes Paternal Grandmother  
   Malig hypertherm Neg Hx  
   Kidney disease Neg Hx  
 
 REVIEW OF SYSTEMS
 
 General: The patient noted to have no problem of fever,no weight loss
  and no chills.
 
 Neurologic: Denies any headache, any lightheadedness. No loss of
 
 consciousness or seizure.
 
 Cardiac: Denies Chest Pain, Palpitation, Dyspnea on Exertion
 
 Pulmonary: Denies cough, wheezing and hemoptysis
 
 GASTROINTESTINAL: Denies nausea vomiting, and diarrhea.
 GENITOURINARY: Noted no dysuria, urgency, or frequency.
 Hematological : Denies any ecchymosis, bleeding or hematuria
 
 Endocrine: Denies any polyphagia, polyuria or hot and cold intolerance
  Musculoskeletal: no new joint pain and swelling. 
 Skin : Denies any new rashes or cutaneous changes.
 
 Rest of the review of systems is unremarkable.
 
 PHYSICAL EXAMINATION
 
 VITAL SIGNS 
 Wt Readings from Last 3 Encounters: 
 19 65.5 kg (144 lb 6.4 oz) 
 19 65.5 kg (144 lb 6.4 oz) 
 18 68.7 kg (151 lb 7.3 oz) 
 
 Temp Readings from Last 3 Encounters: 
 19 98.2 F (36.8 C) (Oral) 
 19 97.7 F (36.5 C) (Axillary) 
 19 98.3 F (36.8 C) (Oral) 
 
 BP Readings from Last 3 Encounters: 
 19 105/58 
 19 108/56 
 01/10/19 115/54 
 
 Pulse Readings from Last 3 Encounters: 
 19 56 
 19 68 
 01/10/19 59 
 
 Head:  Normocephalic atraumatic
 
 
 HEENT: Pink palpebral conjunctivae. Anicteric sclerae. No
 tonsillopharyngeal congestion.
 
 Neck : Noted no  Cervical Lymphadenopathy
 
 CHEST:Clear Breath Sounds Bilateral 
 
 HEART: Regular rate and rhythm. No murmurs thrills or rubs
 
 ABDOMEN: Soft, flat. No tenderness. No hepatosplenomegaly.
 
 GROIN AREA: Negative  for intertrigo.
 
 EXTREMITIES: upper extremity no edema
 Left foot with dressing
 Right foot with wounds less pale and red.
  
 
 NEUROLOGIC: No localizing signs.
 
 Skin : with  ecchymosis. 
 
 LABORATORY DATA
 
 Lab Results 
 Component Value Date 
  WBC 3.90 2019 
  HGB 9.8 (L) 2019 
  HCT 30.1 (L) 2019 
   (L) 2019 
  CHOL 101 2017 
  TRIG 132 2017 
  HDL 34 (L) 2017 
  ALT 21 2019 
  AST 24 2019 
   2019 
  K 3.9 2019 
  CL 97 (L) 2019 
  CREATININE 4.8 (H) 2019 
  BUN 13 2019 
  CO2 31 2019 
  TSH 1.14 2017 
  INR 1.0 2018 
  GLUF 169 (H) 2019 
  HGBA1C 7.5 (H) 2018 
 
 Hepatic Function Panel:  
 Lab Results 
 Component Value Date 
  PROT 6.2 (L) 2019 
  ALB 2.7 (L) 2019 
  BILITOT 0.4 2019 
  BILIDIR 0.1 2017 
   2019 
  AST 24 2019 
  ALT 21 2019 
  
 
 Lipase: 
 Lab Results 
 
 Component Value Date 
  LIPASE 332 2017 
  
 U/A:  
 Lab Results 
 Component Value Date 
  COLORU YELLOW 2011 
  CLARITYU Slightly Cloudy 10/16/2018 
  MEROPENEM 1.009 2013 
  LEUKOCYTESUR  10/16/2018 
    Comment: 
    small 
  NITRITE Negative 10/16/2018 
  UROBILINOGEN Normal 10/16/2018 
  UPRO  10/16/2018 
    Comment: 
    100 
  UPRO 100 2013 
  PHUR 8 10/16/2018 
  BLOODU Negative 10/16/2018 
  KETONES negative 10/16/2018 
  BILIRUBINUR Negative 10/16/2018 
  GLUCOSEU  10/16/2018 
    Comment: 
    moderate 
  
 DIAGNOSTIC IMAGING
 
 CAT scan 
 
 Ultrasound
   
 X-ray Foot Left
 
 Result Date: 2018
 Amputation of the left forefoot at the level of the proximal metatarsals. Surgical cutaneou
s staples are present. No radiographic evidence for osteomyelitis. Normal alignment of the h
indfoot. Mild degeneration of the midfoot. Electronically signed by Oleksandr Lemus MD on 2018 10:12 AM
 
 Ct Head Without Contrast
 
 Result Date: 2018
 1.  No acute intracranial pathology. Electronically signed by Steven Samano MD on  5:28 PM
 
 X-ray Chest 1 View
 
 Result Date: 2019
 1.  Small loculated pleural fluid collection seen overlying the lateral left heart border i
n the lower left chest.  There may also be a small pleural effusion at the right lung base w
hich is layering posteriorly. 2.  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 Electronically s
igned by Eugenio Hoffman DO on 2019 4:59 PM
 
 Us Lower Extremty  Arterial Right
 
 Result Date: 2019
 1. Right leg shows biphasic inflow with a greater than 50% stenosis at the origin of the dobbins
perficial femoral artery (137-309).  Biphasic outflow. 2. Two vessel runoff to the right jeanne
t. Signed by: Eugenio Hoffman Sign Date/Time: 2019 3:11 PM
 
 
 Cherie Villalobos is a 68 y.o. female with the following Problems 
 Patient Active Problem List 
 Diagnosis 
   Proteinuria 
   Vitamin D deficiency 
   Secondary hyperparathyroidism (HCC) 
   Recurrent UTI 
   Coronary artery disease involving native coronary artery of native heart without angina
 pectoris 
   Depression 
   ESRD (end stage renal disease) (HCC) 
   Type 2 diabetes mellitus with chronic kidney disease on chronic dialysis, with long-ter
m current use of insulin (HCC) 
   Anemia in ESRD (end-stage renal disease) (HCC) 
   Hypertension, essential 
   Dyslipidemia 
   Gastroesophageal reflux disease without esophagitis 
   Diabetic foot ulcer (HCC) 
   Loss of weight 
   Dysphagia, unspecified 
   Foot ulcer due to DM  (HCC) 
   Severe protein-calorie malnutrition (HCC) 
   Osteomyelitis of left foot (HCC) 
   Pleural effusion 
   Prolonged Q-T interval on ECG 
   Chronic systolic congestive heart failure (HCC) 
   Chronic diarrhea 
   Chronic anticoagulation 
   Plantar ulcer (HCC) 
   Cardiomyopathy (HCC) 
   COPD (chronic obstructive pulmonary disease) (HCC) 
   ICD (implantable cardioverter-defibrillator) in place 
   Implantable defibrillator reprogramming/check 
   Mixed hyperlipidemia 
   Moderate protein-calorie malnutrition (HCC) 
   Paroxysmal atrial fibrillation (HCC) 
   S/P CABG x 2 
   S/P mitral valve repair 
   Steroid-induced hyperglycemia 
   Stress hyperglycemia 
   Chronic multifocal osteomyelitis of left foot (HCC) 
   PVD (peripheral vascular disease) (HCC) 
   CAD (coronary artery disease) 
   Chest pain 
 
 Plan:
 
 Silver sulfadiazene cream 2x day on the wound. 
 Follow up  Blood culture 2x on different site.
 Follow up  Gram Stain and Culture
 Continue with iv ceftazidime, vancomycin for one week 
 Follow up in one week.
 Discussed with Dr. Josue hospitalist  who agreed and will proceed
 As plan.
 Thank you very much for the consult.
 ______________________
 
 ______________________
 MD Jayson FRANKLIN Christopher D, [facility] - 2019 11:00 AM PSTFormatting of this note may be different
 from the original.
 Vancomycin Monitoring
 
 S:  Pharmacy to dose vancomycin per protocol.  Diagnosis:  Cellulitis.
 O: 
 Lab Results 
 Component Value Date/Time 
  CREATININE 6.8 (H) 2019 04:53 AM 
  WBC 3.39 (L) 2019 04:53 AM 
 
  Wt= 65.5 kg, CrCl= HD T,Th,S. Tmax afeb,
  Cultures= Bloodx2 pending.  Other Abx= ceftazidime, metronidazole. 
 A:  Trough goal:  10- 20 ug/mL
 P: Continue vancomycin per protocol. No levels needed at this time. Will continue to monito
r. 
 
 Pharmacist: Prudence Alcantar MD-R2 - 2019  6:55 AM PSTFormatting of this note may be different fr
om the original.
 [facility]
 Service: Hospitalist
 Resident Progress Note
 
 Hospital Day:  LOS: 1 day 
 Consultants: Treatment Team: 
 Consulting Physician: Srinivasan Jordan DPM
 Consulting Physician: Kirt Mclaughlin MD
 Consulting Physician: Ethan Awad MD
 Admitting Provider: Luiza Rosales MD
 SUBJECTIVE
 
      Patient Summary:  Per Dr. Rosales's H and P from 2019: '69 y/o F with h/o ESRD
 on HD T,TH,Sat (Dr. Asher), DM2, PAD s/p angioplasty of LLE and transmetatarsal amputation 
of left foot 18 by Dr. Jordan due to osteomyelitis, CAD, s/p MI, CABG, AVR , HFrE
F with last EF 20 to 25%, s/p AICD, AF on chronic anticoagulation who was sent to ED by Dr. Elliott for evaluation of right LE.  Patient reports that at HD yesterday it was noted that her
 right toes were red and dusky.  She went to see Dr. Elliott this morning and he was concerned 
that right toes could be ischemic or infected and referred to ED. '
 Vascular surgery was consulted from the emergency department, they did a selective catheter
ization of the left common femoral artery and placed a stent. The patient tolerated the proc
edure well.
      Events Overnight:  
  - No acute overnight events. Afebrile, vital signs stable. The patient reports her pain is
 markedly improved, she states the color is also better to her foot. She has had no other pa
in, no shortness of breath, no nausea or vomiting. Her last bowel movement was this morning.
 Medications: Fentanyl, Norco, Ativan, Versed  2
 
 Scheduled Medications   aspirin  81 mg Oral Daily with breakfast 
   atorvastatin  40 mg Oral Nightly 
   calcium acetate  667 mg Oral TID WC 
   carvedilol  12.5 mg Oral BID WC 
   cefTAZidime  2 g Intravenous After Hemodialysis (see admin instructions) 
   cholecalciferol  5,000 Units Oral Daily 
   clopidogrel  75 mg Oral Daily 
   famotidine  10 mg Oral Daily 
   insulin glargine  15 Units Subcutaneous Nightly 
   insulin lispro (human)  0-3 Units Subcutaneous Nightly 
   insulin lispro (human)  0-6 Units Subcutaneous TID AC 
 
   isosorbide mononitrate  60 mg Oral Daily 
   metroNIDAZOLE  500 mg Oral 3 times per day 
   nortriptyline  25 mg Oral QAM 
  And 
   nortriptyline  75 mg Oral Nightly 
   oxybutynin  2.5 mg Oral BID 
   pantoprazole  40 mg Oral QAM AC 
   rOPINIRole  0.75 mg Oral Nightly 
   sodium chloride (PF)  10 mL Intravenous Q8H 
   vancomycin  500 mg Intravenous See Admin Instructions 
   Vortioxetine HBr  10 mg Oral Nightly 
 
 Continuous Infusions 
   dextrose   
 
 PRN Medications
 acetaminophen **OR** acetaminophen, albuterol, dextrose, dextrose, dextrose, fentaNYL **OR*
* fentaNYL, glucagon, glucagon, HYDROcodone-acetaminophen **OR** HYDROcodone-acetaminophen, 
HYDROmorphone **OR** HYDROmorphone, loperamide, LORazepam, nitroGLYCERIN, ondansetron **OR**
 ondansetron, zolpidem
 
 OBJECTIVE
 Vital Signs:
 /56 (BP Location: Left leg)  | Pulse 77  | Temp 98.5 F (36.9 C) (Oral)  | Resp 16
  | Ht 1.778 m (5' 10")  | Wt 65.5 kg (144 lb 6.4 oz)  | SpO2 98%  | BMI 20.72 kg/m 
 
 Physical Exam
 
 General Appearance: Alert and cooperative, sitting up in bed receiving dialysis and appears
 to be in no acute distress. 
 
 HEENNT: Normocephalic and atraumatic. Hearing grossly intact. No nasal discharge. No trache
al deviation. 
 
 Cardiovascular: Rhythm and rate are regular. 2/6 systolic murmur heard best over the left u
pper sternal border. No gallops or rubs appreciated. Peripheral pulses 1+ on the right. No l
ower extremity edema.
 
 Pulmonary/Chest: Lungs are clear to auscultation bilaterally. Effort is normal. Normal jadon
th sounds. 
 
 Abdominal: Soft, nontender, nondistended. Normoactive bowel sounds. No guarding or rebound 
tenderness. 
 
 Musculoskeletal: Normal range of motion. Normal muscular development. Patient with forefoot
 amputation on the left. Left foot wrapped in dressing, C/D/I. Right foot with erythema over
 the great big toe and cyanosis over the second toe, patient stated this is improved from pr
ior. Her right foot is warm to the touch and has palpable dorsalis pedis pulse.
 
 Neurological: CN II-XII grossly intact. Oriented to person, place, and time. 
 
 Skin: Skin is warm and dry. No rash noted. 
 
 Psychiatric:The patient was able to demonstrate good judgement and reason, without hallucin
ations, abnormal affect or abnormal behaviors during the examination. 
 
 DATA
 
 Recent Labs
 Lab 19
 
 0453 19
 1543 19
 0554 19
 0847 
 WBC 3.39* 5.53 4.08 4.23 
 HGB 9.5* 9.4* 9.7* 8.5* 
 HCT 29.2* 28.4* 29.7* 26.0* 
 * 147* 125* 134* 
 NEUTOPHILPCT 66.95  --   --  74.02 
 MONOPCT 11.96  --   --  7.65 
 
 Recent Labs
 Lab 19
 0453 19
 1543 19
 0554 19
 0847 
  139 138 139 
 K 4.2 4.0 4.4 4.4 
  100 100 99 
 CO2 28 29 28 32 
 BUN 26* 23 15 24 
 CREATININE 6.8* 6.1* 4.9* 6.5* 
 ALB  --  2.7* 2.6* 2.2* 
 PROT  --  6.2* 6.1*  --  
 BILITOT  --  0.4 0.5  --  
 ALT  --    --  
 AST  --  24   --  
 
 Phosphorus:  
 Lab Results 
 Component Value Date 
  PHOS 4.4 2019 
 
 Recent Labs
 Lab 19
 1543 
 CKTOTAL 28* 
 
 Intake/Output Summary (Last 24 hours) at 19 0655
 Last data filed at 19 0341
  Gross per 24 hour 
 Intake              300 ml 
 Output                0 ml 
 Net              300 ml 
 
 Microbiology: Blood cultures pending
 
 Radiology
 Us Lower Extremty  Arterial Right
 
 Result Date: 2019
 1. Right leg shows biphasic inflow with a greater than 50% stenosis at the origin of the dobbins
perficial femoral artery (137-309).  Biphasic outflow. 2. Two vessel runoff to the right jeanne
t. Signed by: Eugenio Hoffman Sign Date/Time: 2019 3:11 PM
 
 PROBLEM LIST
 
 Principal Problem:
   PVD (peripheral vascular disease) ([facility])
 
 Active Problems:
   ESRD (end stage renal disease) ([facility])
   Type 2 diabetes mellitus with chronic kidney disease on chronic dialysis, with long-term 
current use of insulin ([facility])
   Anemia in ESRD (end-stage renal disease) ([facility])
   Hypertension, essential
   Chronic systolic congestive heart failure ([facility])
   COPD (chronic obstructive pulmonary disease) ([facility])
   Paroxysmal atrial fibrillation ([facility])
   CAD (coronary artery disease)
 Resolved Problems:
   * No resolved hospital problems. *
 
 ASSESSMENT & PLAN
 
 Patient Active Hospital Problem List:
  PVD (peripheral vascular disease) ([facility]) (2018)
   Assessment: Sent by ASAF Torrez, to the emergency department. Dr. Mclaughlin was consulted, IR 
angiogram yesterday. She was found to have mesenteric stenosis and PAD. Stent was placed in 
the left common iliac artery. She tolerated the catheterization well, and has improved vascu
lar blood flow to the right foot, although still with some cyanosis of the second toe.
   Plan: 
 Infectious disease consulting, appreciate recommendations
 IV Ceftazidime, vancomycin, metronidazole
 Vascular surgery consulting, appreciate recommendations
 Restarted apixaban (eliquis) today
 Fentanyl, Tylenol, Norco available prn for pain control
 Wound care consulted, appreciate recommendations
 Podiatry has also been consulted
 
  ESRD (end stage renal disease) ([facility]) (2016)
    Anemia in ESRD (end-stage renal disease) ([facility]) (2016)
 Assessment: With dialysis Tuesday, Thursday, Saturday, sees Dr. Asher clinic. She received 
her regular dialysis today without issue.
   Plan: 
 Nephrology consulting, appreciate recommendations.
 Continue home PhosLo, vitamin D
 She receives EPO injections with her dialysis
 
  Type 2 diabetes mellitus with chronic kidney disease on chronic dialysis, with long-term c
urrent use of insulin ([facility]) (2016)
   Assessment: The patient reports she normally takes 21 units of long-acting insulin at nig
ht with an insulin sliding scale at meals. Last hemoglobin A1c 7.5 percent in 2018.
 
   Plan: Lantus 15 units nightly
 Mild insulin sliding scale
 Aspirin, statin
 
  Hypertension, essential (2016)
   Assessment: Patient's blood pressure has been normal, and on the side of low during dialy
sis but with systolic blood pressure greater than 90.
   Plan: 
 Continue home Coreg, Imdur 
 
  Chronic systolic congestive heart failure ([facility]) (2018)
   Assessment: Last echo done 2018 showed severely impaired EF 20-25 percent, mild 
aortic regurgitation, moderate aortic stenosis, mild to moderate mitral regurgitation with m
oderate pulmonary hypertension. 
   Plan:
 Continue home Aspirin, statin, Coreg, Imdur
 
 
  COPD
 Assessment: The patient currently states that this is stable, she is currently satting well
 on room air with no shortness of breath.
 Plan: Albuterol prn
 
  CAD (coronary artery disease) (2018)
   Assessment: She has a history of chest pain with dialysis, however this has not happened 
during this hospitalization. Last catheterization 2018, CABG in 2018. 
   Plan: 
 Continue home Aspirin, statin, Imdur
 
 Continue home for Vortioxetine, nortriptyline, oxybutynin, pantoprazole
 Currently holding losartan
 
 Diet: Diabetic 
 DVT Prophylaxis: Eliquis
 Disposition: Inpatient
 Code Status:  Full Code
 
 Prudence Nicholson MD-R2
 2019
 6:55 AM
 
 
 Associated attestation - Moiz Josue MD - 2019  5:13 PM PSTPatient seen an
d examined along with residents. All labs and notes personally reviewed by me. Patient under
went vascular procedure yesterday with angioplasty and stenting of right SFA. She now has wa
rm right foot indicating resumption of circulation. Patient is on three antibiotics (vanc, F
lagyl and ceftazidime) by Dr. Elliott for right first and second toe cellulitis. No surgical in
tervention needed at this stage for right foot infection. Appreciate help of Dr. Mclaughlin, Dr. Norma clemente and Dr. Jordan. 
 I agree with the progress note of Dr. Nicholson. Abigail Morgan, [facility] - 2019  8:10 PM 
PSTFormatting of this note may be different from the original.
 Clinical Pharmacy Note: Renal Monitoring
 Cherie Villalobos 68 y.o. female
 Ht Readings from Last 1 Encounters: 
 19 1.778 m (5' 10") 
   
 Wt Readings from Last 1 Encounters: 
 19 65.5 kg (144 lb 6.4 oz) 
  
 Serum creatinine: 6.1 mg/dL (H) 19 1543
 Estimated creatinine clearance: 9 mL/min (A)
 Patient has h/o ESRD on HD qTTS
 
 Pharmacy dosing for renal function per Dr. Luiza Rosales.
 
 Currently, there are no medications needing to be adjusted.
 
 Pharmacy will continue to monitor and make changes as needed per renal function. 
 
 Abigail Morgan, [facility]
 PGY-1 Pharmacy Resident
 in this encounter
 
 Plan of Treatment
 
 
+--------+---------+-----------+----------------------+-------------+
 
| Date   | Type    | Specialty | Care Team            | Description |
+--------+---------+-----------+----------------------+-------------+
| 04/10/ | Office  | Podiatry  |   Srinivasan Jordan,  |             |
| 2019   | Visit   |           | DPM  [address] [facility], |             |
|        |         |           |  [address]  [place],  |             |
|        |         |           | [place] [address]             |             |
|        |         |           | [phone]         |             |
|        |         |           | [phone] (Fax)   |             |
+--------+---------+-----------+----------------------+-------------+
 as of this encounter
 
 Procedures
 
 
+----------------------+--------+-------------+----------------------+----------------------
+
| Procedure Name       | Priori | Date/Time   | Associated Diagnosis | Comments             
|
|                      | ty     |             |                      |                      
|
+----------------------+--------+-------------+----------------------+----------------------
+
| POCT GLUCOSE         | Routin | 2019  |                      |   Results for this   
|
|                      | e      | 12:10 PM    |                      | procedure are in the 
|
|                      |        | PST         |                      |  results section.    
|
+----------------------+--------+-------------+----------------------+----------------------
+
| POCT GLUCOSE         | Routin | 2019  |                      |   Results for this   
|
|                      | e      |  5:55 AM    |                      | procedure are in the 
|
|                      |        | PST         |                      |  results section.    
|
+----------------------+--------+-------------+----------------------+----------------------
+
| CBC W/AUTO DIFF      | Routin | 2019  |                      |   Results for this   
|
| (REFLEX TO MANUAL)   | e      |  5:38 AM    |                      | procedure are in the 
|
|                      |        | PST         |                      |  results section.    
|
+----------------------+--------+-------------+----------------------+----------------------
+
| PHOSPHOROUS          | Routin | 2019  |                      |   Results for this   
|
|                      | e      |  5:38 AM    |                      | procedure are in the 
|
|                      |        | PST         |                      |  results section.    
|
+----------------------+--------+-------------+----------------------+----------------------
+
| MAGNESIUM            | Routin | 2019  |                      |   Results for this   
|
|                      | e      |  5:38 AM    |                      | procedure are in the 
|
|                      |        | PST         |                      |  results section.    
|
 
+----------------------+--------+-------------+----------------------+----------------------
+
| BASIC METABOLIC      | Routin | 2019  |                      |   Results for this   
|
| PANEL                | e      |  5:38 AM    |                      | procedure are in the 
|
|                      |        | PST         |                      |  results section.    
|
+----------------------+--------+-------------+----------------------+----------------------
+
| POCT GLUCOSE         | Routin | 2019  |                      |   Results for this   
|
|                      | e      |  9:29 PM    |                      | procedure are in the 
|
|                      |        | PST         |                      |  results section.    
|
+----------------------+--------+-------------+----------------------+----------------------
+
| POCT GLUCOSE         | Routin | 2019  |                      |   Results for this   
|
|                      | e      |  4:06 PM    |                      | procedure are in the 
|
|                      |        | PST         |                      |  results section.    
|
+----------------------+--------+-------------+----------------------+----------------------
+
| POCT GLUCOSE         | Routin | 2019  |                      |   Results for this   
|
|                      | e      | 12:13 PM    |                      | procedure are in the 
|
|                      |        | PST         |                      |  results section.    
|
+----------------------+--------+-------------+----------------------+----------------------
+
| EKG STANDARD 12 LEAD | Routin | 2019  |                      |   Results for this   
|
|                      | e      |  6:18 AM    |                      | procedure are in the 
|
|                      |        | PST         |                      |  results section.    
|
+----------------------+--------+-------------+----------------------+----------------------
+
| POCT GLUCOSE         | Routin | 2019  |                      |   Results for this   
|
|                      | e      |  5:21 AM    |                      | procedure are in the 
|
|                      |        | PST         |                      |  results section.    
|
+----------------------+--------+-------------+----------------------+----------------------
+
| CBC W/AUTO DIFF      | Routin | 2019  |                      |   Results for this   
|
| (REFLEX TO MANUAL)   | e - AM |  4:53 AM    |                      | procedure are in the 
|
|                      |        | PST         |                      |  results section.    
|
+----------------------+--------+-------------+----------------------+----------------------
+
| BASIC METABOLIC      | Routin | 2019  |                      |   Results for this   
|
 
| PANEL                | e - AM |  4:53 AM    |                      | procedure are in the 
|
|                      |        | PST         |                      |  results section.    
|
+----------------------+--------+-------------+----------------------+----------------------
+
| POCT GLUCOSE         | Routin | 2019  |                      |   Results for this   
|
|                      | e      |  9:00 PM    |                      | procedure are in the 
|
|                      |        | PST         |                      |  results section.    
|
+----------------------+--------+-------------+----------------------+----------------------
+
| IR STENT FEMORAL     | Routin | 2019  |                      |   Results for this   
|
| POPLITEAL            | e      |  6:45 PM    |                      | procedure are in the 
|
|                      |        | PST         |                      |  results section.    
|
+----------------------+--------+-------------+----------------------+----------------------
+
| IR ANGIOGRAM         | Routin | 2019  |                      |   Results for this   
|
| EXTREMITY RIGHT      | e      |  6:45 PM    |                      | procedure are in the 
|
|                      |        | PST         |                      |  results section.    
|
+----------------------+--------+-------------+----------------------+----------------------
+
| IR AORTAGRAM         | Routin | 2019  |                      |   Results for this   
|
| ABDOMINAL            | e      |  6:45 PM    |                      | procedure are in the 
|
| SERIALOGRAM          |        | PST         |                      |  results section.    
|
+----------------------+--------+-------------+----------------------+----------------------
+
| BLOOD CULTURE, SET 2 | Timed  | 2019  |                      |   Results for this   
|
|                      |        |  6:05 PM    |                      | procedure are in the 
|
|                      |        | PST         |                      |  results section.    
|
+----------------------+--------+-------------+----------------------+----------------------
+
| IR GUIDANCE VASCULAR | Routin | 2019  |                      |   Results for this   
|
|  ACCESS US           | e      |  5:40 PM    |                      | procedure are in the 
|
|                      |        | PST         |                      |  results section.    
|
+----------------------+--------+-------------+----------------------+----------------------
+
| BLOOD CULTURE, SET 1 | Timed  | 2019  |                      |   Results for this   
|
|                      |        |  3:43 PM    |                      | procedure are in the 
|
|                      |        | PST         |                      |  results section.    
|
 
+----------------------+--------+-------------+----------------------+----------------------
+
| SEDIMENTATION RATE,  | STAT   | 2019  |                      |   Results for this   
|
| AUTOMATED            |        |  3:43 PM    |                      | procedure are in the 
|
|                      |        | PST         |                      |  results section.    
|
+----------------------+--------+-------------+----------------------+----------------------
+
| CBC W/MANUAL DIFF    | STAT   | 2019  |                      |   Results for this   
|
|                      |        |  3:43 PM    |                      | procedure are in the 
|
|                      |        | PST         |                      |  results section.    
|
+----------------------+--------+-------------+----------------------+----------------------
+
| C-REACTIVE PROTEIN   | STAT   | 2019  |                      |   Results for this   
|
|                      |        |  3:43 PM    |                      | procedure are in the 
|
|                      |        | PST         |                      |  results section.    
|
+----------------------+--------+-------------+----------------------+----------------------
+
| CK                   | STAT   | 2019  |                      |   Results for this   
|
|                      |        |  3:43 PM    |                      | procedure are in the 
|
|                      |        | PST         |                      |  results section.    
|
+----------------------+--------+-------------+----------------------+----------------------
+
| COMPREHENSIVE        | STAT   | 2019  |                      |   Results for this   
|
| METABOLIC PANEL      |        |  3:43 PM    |                      | procedure are in the 
|
|                      |        | PST         |                      |  results section.    
|
+----------------------+--------+-------------+----------------------+----------------------
+
| US LOWER EXTREMITY   | STAT   | 2019  |                      |   Results for this   
|
| ARTERIAL RIGHT       |        |  2:23 PM    |                      | procedure are in the 
|
|                      |        | PST         |                      |  results section.    
|
+----------------------+--------+-------------+----------------------+----------------------
+
| ED INFORMATION       | Routin | 2019  |                      |   Results for this   
|
| EXCHANGE             | e      |  1:03 PM    |                      | procedure are in the 
|
|                      |        | PST         |                      |  results section.    
|
+----------------------+--------+-------------+----------------------+----------------------
+
 in this encounter
 
 
 Results
 POCT glucose (2019 12:10 PM)
 
+--------------------+--------------------------+---------------+-----------------+
| Component          | Value                    | Ref Range     | Performed At    |
+--------------------+--------------------------+---------------+-----------------+
| GLUCOSE,POC SCREEN | 237 (H)Comment: Testing  | 65 - 99 mg/dL | [facility] |
|                    | performed at [facility];[address]     |               |                 |
|                    | [address] [facility];[place], [place]   |               |                 |
|                    | [address]                    |               |                 |
+--------------------+--------------------------+---------------+-----------------+
 
 
 
+-------------------+------------------+--------------------+--------------+
| Performing        | Address          | City/State/Zipcode | Phone Number |
| Organization      |                  |                    |              |
+-------------------+------------------+--------------------+--------------+
|   [facility] |   [address] | [place] [address] |              |
+-------------------+------------------+--------------------+--------------+
 POCT glucose (2019  5:55 AM)
 
+--------------------+--------------------------+---------------+-----------------+
| Component          | Value                    | Ref Range     | Performed At    |
+--------------------+--------------------------+---------------+-----------------+
| GLUCOSE,POC SCREEN | 135 (H)Comment: Testing  | 65 - 99 mg/dL | [facility] |
|                    | performed at [facility]     |               |                 |
|                    | [address];[place]   |               |                 |
|                    | [address]                    |               |                 |
+--------------------+--------------------------+---------------+-----------------+
 
 
 
+-------------------+------------------+--------------------+--------------+
| Performing        | Address          | City/State/Zipcode | Phone Number |
| Organization      |                  |                    |              |
+-------------------+------------------+--------------------+--------------+
|   [facility] |   [address] | [place] [address] |              |
+-------------------+------------------+--------------------+--------------+
 Phosphorus (2019  5:38 AM)
 
+------------+--------------------------+-----------------+--------------+
| Component  | Value                    | Ref Range       | Performed At |
+------------+--------------------------+-----------------+--------------+
| PHOSPHORUS | 3.6Comment: Testing      | 2.3 - 4.8 mg/dL | [facility]   |
|            | performed at [facility], [address] |                 | LABORATORY   |
|            |  [address],        |                 |              |
|            | [place]  [address]     |                 |              |
+------------+--------------------------+-----------------+--------------+
 
 
 
+----------+
| Specimen |
+----------+
| Blood    |
+----------+
 
 
 
 
+---------------+-------------------------+---------------------+----------------+
| Performing    | Address                 | City/State/Zipcode  | Phone Number   |
| Organization  |                         |                     |                |
+---------------+-------------------------+---------------------+----------------+
|   [facility]  |   [address]  | [place] [address] |   [phone] |
| LABORATORY    | [address]                   |                     |                |
+---------------+-------------------------+---------------------+----------------+
 Magnesium (2019  5:38 AM)
 
+-----------+--------------------------+-----------------+--------------+
| Component | Value                    | Ref Range       | Performed At |
+-----------+--------------------------+-----------------+--------------+
| MAGNESIUM | 2.3Comment: Testing      | 1.7 - 2.4 mg/dL | [facility]   |
|           | performed at [facility], [address] |                 | LABORATORY   |
|           |  [facility],        |                 |              |
|           | [place]  [address]     |                 |              |
+-----------+--------------------------+-----------------+--------------+
 
 
 
+----------+
| Specimen |
+----------+
| Blood    |
+----------+
 
 
 
+---------------+-------------------------+---------------------+----------------+
| Performing    | Address                 | City/State/Zipcode  | Phone Number   |
| Organization  |                         |                     |                |
+---------------+-------------------------+---------------------+----------------+
|   [facility]  |   [address]  | [place] [address] |   [phone] |
| LABORATORY    | [address]                   |                     |                |
+---------------+-------------------------+---------------------+----------------+
 Basic metabolic panel (2019  5:38 AM)
 
+----------------+--------------------------+-------------------+--------------+
| Component      | Value                    | Ref Range         | Performed At |
+----------------+--------------------------+-------------------+--------------+
| SODIUM         | 139                      | 135 - 145 mmol/L  | [facility]   |
|                |                          |                   | LABORATORY   |
+----------------+--------------------------+-------------------+--------------+
| POTASSIUM      | 3.9                      | 3.5 - 4.9 mmol/L  | [facility]   |
|                |                          |                   | LABORATORY   |
+----------------+--------------------------+-------------------+--------------+
| CHLORIDE       | 97 (L)                   | 99 - 109 mmol/L   | [facility]   |
|                |                          |                   | LABORATORY   |
+----------------+--------------------------+-------------------+--------------+
| CO2            | 31                       | 23 - 32 mmol/L    | [facility]   |
|                |                          |                   | LABORATORY   |
+----------------+--------------------------+-------------------+--------------+
| ANION GAP AGAP | 15                       | 5 - 20 mmol/L     | [facility]   |
|                |                          |                   | LABORATORY   |
+----------------+--------------------------+-------------------+--------------+
| GLUCOSE        | 169 (H)                  | 65 - 99 mg/dL     | [facility]   |
|                |                          |                   | LABORATORY   |
+----------------+--------------------------+-------------------+--------------+
| BUN            | 13                       | 8 - 25 mg/dL      | [facility]   |
|                |                          |                   | LABORATORY   |
 
+----------------+--------------------------+-------------------+--------------+
| CREATININE     | 4.8 (H)                  | 0.50 - 1.00 mg/dL | [facility]   |
|                |                          |                   | LABORATORY   |
+----------------+--------------------------+-------------------+--------------+
| BUN/CREAT      | 3                        |                   | [facility]   |
|                |                          |                   | LABORATORY   |
+----------------+--------------------------+-------------------+--------------+
| CALCIUM        | 9.4                      | 8.5 - 10.5 mg/dL  | [facility]   |
|                |                          |                   | LABORATORY   |
+----------------+--------------------------+-------------------+--------------+
| EGFR           | 9 (L)Comment: GFR <60:   | >60 mL/min/1.73m2 | [facility]   |
|                | CHRONIC KIDNEY DISEASE,  |                   | LABORATORY   |
|                | IF FOUND OVER A 3 MONTH  |                   |              |
|                | PERIOD.GFR <15: KIDNEY   |                   |              |
|                | FAILURE.FOR       |                   |              |
|                | [employer], MULTIPLY THE  |                   |              |
|                | CALCULATED GFR BY        |                   |              |
|                | 1.210.This eGFR is       |                   |              |
|                | calculated using the     |                   |              |
|                | MDRD IDMS traceable      |                   |              |
|                | equation.Testing         |                   |              |
|                | performed at [facility], [address] |                   |              |
|                |  [facility],        |                   |              |
|                | [place]    [address]   |                   |              |
+----------------+--------------------------+-------------------+--------------+
 
 
 
+----------+
| Specimen |
+----------+
| Blood    |
+----------+
 
 
 
+---------------+-------------------------+---------------------+----------------+
| Performing    | Address                 | City/State/Zipcode  | Phone Number   |
| Organization  |                         |                     |                |
+---------------+-------------------------+---------------------+----------------+
|   [facility]  |   [address]  | [place] [address] |   [phone] |
| LABORATORY    | [address]                   |                     |                |
+---------------+-------------------------+---------------------+----------------+
 CBC w/auto diff (reflex to manual) (2019  5:38 AM)
 
+----------------------+--------------------------------------------------------------------
-----+-------------------+--------------+
| Component            | Value                                                              
     | Ref Range         | Performed At |
+----------------------+--------------------------------------------------------------------
-----+-------------------+--------------+
| WBC                  | 3.90                                                               
     | 3.80 - 11.00 K/uL | [facility]   |
|                      |                                                                    
     |                   | LABORATORY   |
+----------------------+--------------------------------------------------------------------
-----+-------------------+--------------+
| RBC                  | 2.87 (L)                                                           
     | 3.70 - 5.10 M/uL  | [facility]   |
|                      |                                                                    
 
     |                   | LABORATORY   |
+----------------------+--------------------------------------------------------------------
-----+-------------------+--------------+
| HGB                  | 9.8 (L)                                                            
     | 11.3 - 15.5 g/dL  | [facility]   |
|                      |                                                                    
     |                   | LABORATORY   |
+----------------------+--------------------------------------------------------------------
-----+-------------------+--------------+
| HCT                  | 30.1 (L)                                                           
     | 34.0 - 46.0 %     | [facility]   |
|                      |                                                                    
     |                   | LABORATORY   |
+----------------------+--------------------------------------------------------------------
-----+-------------------+--------------+
| MCV                  | 105.1 (H)                                                          
     | 80.0 - 100.0 fl   | [facility]   |
|                      |                                                                    
     |                   | LABORATORY   |
+----------------------+--------------------------------------------------------------------
-----+-------------------+--------------+
| MCH                  | 34.3 (H)                                                           
     | 27.0 - 34.0 pg    | [facility]   |
|                      |                                                                    
     |                   | LABORATORY   |
+----------------------+--------------------------------------------------------------------
-----+-------------------+--------------+
| MCHC                 | 32.7                                                               
     | 32.0 - 35.5 g/dL  | [facility]   |
|                      |                                                                    
     |                   | LABORATORY   |
+----------------------+--------------------------------------------------------------------
-----+-------------------+--------------+
| RDW SD               | 61.7 (H)                                                           
     | 37 - 53 fl        | [facility]   |
|                      |                                                                    
     |                   | LABORATORY   |
+----------------------+--------------------------------------------------------------------
-----+-------------------+--------------+
| PLT                  | 137 (L)                                                            
     | 150 - 400 K/uL    | [facility]   |
|                      |                                                                    
     |                   | LABORATORY   |
+----------------------+--------------------------------------------------------------------
-----+-------------------+--------------+
| MPV                  | 9.5                                                                
     | fl                | [facility]   |
|                      |                                                                    
     |                   | LABORATORY   |
+----------------------+--------------------------------------------------------------------
-----+-------------------+--------------+
| DIFF TYPE            | MANUAL                                                             
     |                   | [facility]   |
|                      |                                                                    
     |                   | LABORATORY   |
+----------------------+--------------------------------------------------------------------
-----+-------------------+--------------+
| Neutrophils Manual   | 63                                                                 
     | %                 | [facility]   |
|                      |                                                                    
 
     |                   | LABORATORY   |
+----------------------+--------------------------------------------------------------------
-----+-------------------+--------------+
| Lymphocytes Manual   | 30                                                                 
     | %                 | [facility]   |
|                      |                                                                    
     |                   | LABORATORY   |
+----------------------+--------------------------------------------------------------------
-----+-------------------+--------------+
| Monocytes Manual     | 7                                                                  
     | %                 | [facility]   |
|                      |                                                                    
     |                   | LABORATORY   |
+----------------------+--------------------------------------------------------------------
-----+-------------------+--------------+
| Neutrophils Absolute | 2.46                                                               
     | 1.90 - 7.40 K/uL  | [facility]   |
|                      |                                                                    
     |                   | LABORATORY   |
+----------------------+--------------------------------------------------------------------
-----+-------------------+--------------+
| Lymphocytes Absolute | 1.17                                                               
     | 1.00 - 3.90 K/uL  | [facility]   |
|                      |                                                                    
     |                   | LABORATORY   |
+----------------------+--------------------------------------------------------------------
-----+-------------------+--------------+
| Monocytes Absolute   | 0.27                                                               
     | 0.00 - 0.80 K/uL  | [facility]   |
|                      |                                                                    
     |                   | LABORATORY   |
+----------------------+--------------------------------------------------------------------
-----+-------------------+--------------+
| MORPHOLOGY           | 1+Comment:                                                         
     |                   | [facility]   |
|                      | ANISO1+HYPONORMAL PLT                                              
     |                   | LABORATORY   |
|                      | MORPHTesting performed                                             
     |                   |              |
|                      | at [facility], [address]                                                     
     |                   |              |
|                      | [facility],                                                   
     |                   |              |
|                      | [place]    [address]                                             
     |                   |              |
|                      |Testing performed at [facility], [address], [place], [place]    [address] |                   |              |
|                      |                                                                    
     |                   |              |
+----------------------+--------------------------------------------------------------------
-----+-------------------+--------------+
 
 
 
+----------+
| Specimen |
+----------+
| Blood    |
+----------+
 
 
 
 
+---------------+-------------------------+---------------------+----------------+
| Performing    | Address                 | City/State/Zipcode  | Phone Number   |
| Organization  |                         |                     |                |
+---------------+-------------------------+---------------------+----------------+
|   [facility]  |   [address]  | [place] [address] |   [phone] |
| LABORATORY    | [address]                   |                     |                |
+---------------+-------------------------+---------------------+----------------+
 POCT glucose (2019  9:29 PM)
 
+--------------------+--------------------------+---------------+-----------------+
| Component          | Value                    | Ref Range     | Performed At    |
+--------------------+--------------------------+---------------+-----------------+
| GLUCOSE,POC SCREEN | 262 (H)Comment: Testing  | 65 - 99 mg/dL | [facility] LABORATORY |
|                    | performed at [facility];[address]     |               |                 |
|                    | [address];[place]   |               |                 |
|                    | [id number]                    |               |                 |
+--------------------+--------------------------+---------------+-----------------+
 
 
 
+-------------------+------------------+--------------------+--------------+
| Performing        | Address          | City/State/Zipcode | Phone Number |
| Organization      |                  |                    |              |
+-------------------+------------------+--------------------+--------------+
|   [facility] LABORATORY |   [address] | [place] [address] |              |
+-------------------+------------------+--------------------+--------------+
 POCT glucose (2019  4:06 PM)
 
+--------------------+--------------------------+---------------+-----------------+
| Component          | Value                    | Ref Range     | Performed At    |
+--------------------+--------------------------+---------------+-----------------+
| GLUCOSE,POC SCREEN | 193 (H)Comment: Testing  | 65 - 99 mg/dL | [facility] LAB[facility]AT[facility] |
|                    | performed at [facility];[address]     |               |                 |
|                    | [address];[place]   |               |                 |
|                    | [address]                    |               |                 |
+--------------------+--------------------------+---------------+-----------------+
 
 
 
+-------------------+------------------+--------------------+--------------+
| Performing        | Address          | City/State/Zipcode | Phone Number |
| Organization      |                  |                    |              |
+-------------------+------------------+--------------------+--------------+
|   [facility] LABORATORY |   [address] | [facility] [place] [address] |              |
+-------------------+------------------+--------------------+--------------+
 POCT glucose (2019 12:13 PM)
 
+--------------------+--------------------------+---------------+-----------------+
| Component          | Value                    | Ref Range     | Performed At    |
+--------------------+--------------------------+---------------+-----------------+
| GLUCOSE,POC SCREEN | 145 (H)Comment: Testing  | 65 - 99 mg/dL | [facility] LABORAT[facility] |
|                    | performed at [facility];[address]     |               |                 |
|                    | [address];[place]   |               |                 |
|                    | [address]                    |               |                 |
+--------------------+--------------------------+---------------+-----------------+
 
 
 
 
+-------------------+------------------+--------------------+--------------+
| Performing        | Address          | City/State/Zipcode | Phone Number |
| Organization      |                  |                    |              |
+-------------------+------------------+--------------------+--------------+
|   [facility] LABORATORY |   [address] | [place] [address] |              |
+-------------------+------------------+--------------------+--------------+
 EKG STANDARD 12 LEAD (2019  6:18 AM)
 
+-------------------+--------------------------+-----------+--------------+
| Component         | Value                    | Ref Range | Performed At |
+-------------------+--------------------------+-----------+--------------+
| Ventricular Rate  | 73                       | BPM       | [facility] EKG     |
+-------------------+--------------------------+-----------+--------------+
| Atrial Rate       | 73                       | BPM       | [facility]     |
+-------------------+--------------------------+-----------+--------------+
| P-R Interval      | 146                      | ms        | [facility] E[facility]     |
+-------------------+--------------------------+-----------+--------------+
| QRS Duration      | 128                      | ms        | [facility] EKG     |
+-------------------+--------------------------+-----------+--------------+
| Q-T Interval      | 464                      | ms        | [facility] EKG     |
+-------------------+--------------------------+-----------+--------------+
| QTC Calculation   | 511                      | ms        | [facility] E[facility]     |
| (Bezet)           |                          |           |              |
+-------------------+--------------------------+-----------+--------------+
| Calculated P Axis | 70                       | degrees   | [facility] EKG     |
+-------------------+--------------------------+-----------+--------------+
| Calculated R Axis | -38                      | degrees   | [facility] EKG     |
+-------------------+--------------------------+-----------+--------------+
| Calculated T Axis | 9                        | degrees   | [facility] E[facility]     |
+-------------------+--------------------------+-----------+--------------+
| Diagnosis         | Normal sinus             |           | [facility] E[facility]     |
|                   | rhythmPossible Left      |           |              |
|                   | atrial enlargementLeft   |           |              |
|                   | axis                     |           |              |
|                   | deviationNon-specific    |           |              |
|                   | intra-ventricular        |           |              |
|                   | conduction blockCannot   |           |              |
|                   | rule out Septal infarct  |           |              |
|                   | , age                    |           |              |
|                   | undeterminedAbnormal     |           |              |
|                   | ECGWhen compared with    |           |              |
|                   | ECG of 2019       |           |              |
|                   | 05:50,Minimal criteria   |           |              |
|                   | for Septal infarct are   |           |              |
|                   | now PresentConfirmed by  |           |              |
|                   | EN FRANK ([id number]) on   |           |              |
|                   | 2019 12:03:10 PM    |           |              |
+-------------------+--------------------------+-----------+--------------+
 
 
 
+--------------+-------------------+--------------------+--------------+
| Performing   | Address           | City/State/[facility] | Phone Number |
| Organization |                   |                    |              |
+--------------+-------------------+--------------------+--------------+
|   [facility]   |   [address] | [place] [address] |              |
+--------------+-------------------+--------------------+--------------+
 POCT glucose (2019  5:21 AM)
 
+--------------------+--------------------------+---------------+-----------------+
 
| Component          | Value                    | Ref Range     | Performed At    |
+--------------------+--------------------------+---------------+-----------------+
| GLUCOSE,POC SCREEN | 142 (H)Comment: Testing  | 65 - 99 mg/dL | [facility] LAB[facility]AT[facility] |
|                    | performed at [facility];[address]     |               |                 |
|                    | [address];[place], [place]   |               |                 |
|                    | [address]                    |               |                 |
+--------------------+--------------------------+---------------+-----------------+
 
 
 
+-------------------+------------------+--------------------+--------------+
| Performing        | Address          | City/State/Zipcode | Phone Number |
| Organization      |                  |                    |              |
+-------------------+------------------+--------------------+--------------+
|   [facility] LABORAT[facility] |   [address] | [place] [address] |              |
+-------------------+------------------+--------------------+--------------+
 Basic metabolic panel (2019  4:53 AM)
 
+----------------+--------------------------+-------------------+--------------+
| Component      | Value                    | Ref Range         | Performed At |
+----------------+--------------------------+-------------------+--------------+
| SODIUM         | 140                      | 135 - 145 mmol/L  | [facility]   |
|                |                          |                   | LABORATORY   |
+----------------+--------------------------+-------------------+--------------+
| POTASSIUM      | 4.2                      | 3.5 - 4.9 mmol/L  | [facility]   |
|                |                          |                   | LABORATORY   |
+----------------+--------------------------+-------------------+--------------+
| CHLORIDE       | 101                      | 99 - 109 mmol/L   | [facility]   |
|                |                          |                   | LABORATORY   |
+----------------+--------------------------+-------------------+--------------+
| CO2            | 28                       | 23 - 32 mmol/L    | [facility]   |
|                |                          |                   | LABORATORY   |
+----------------+--------------------------+-------------------+--------------+
| ANION GAP AGAP | 15                       | 5 - 20 mmol/L     | [facility]   |
|                |                          |                   | LABORATORY   |
+----------------+--------------------------+-------------------+--------------+
| GLUCOSE        | 157 (H)                  | 65 - 99 mg/dL     | [facility]   |
|                |                          |                   | LABORATORY   |
+----------------+--------------------------+-------------------+--------------+
| BUN            | 26 (H)                   | 8 - 25 mg/dL      | [facility]   |
|                |                          |                   | LABORATORY   |
+----------------+--------------------------+-------------------+--------------+
| CREATININE     | 6.8 (H)                  | 0.50 - 1.00 mg/dL | [facility]   |
|                |                          |                   | LABORATORY   |
+----------------+--------------------------+-------------------+--------------+
| BUN/CREAT      | 4                        |                   | [facility]   |
|                |                          |                   | LABORATORY   |
+----------------+--------------------------+-------------------+--------------+
| CALCIUM        | 9.3                      | 8.5 - 10.5 mg/dL  | [facility]   |
|                |                          |                   | LABORATORY   |
+----------------+--------------------------+-------------------+--------------+
| EGFR           | 6 (L)Comment: GFR <60:   | >60 mL/min/1.73m2 | [facility]   |
|                | CHRONIC KIDNEY DISEASE,  |                   | LABORATORY   |
|                | IF FOUND OVER A 3 MONTH  |                   |              |
|                | PERIOD.GFR <15: KIDNEY   |                   |              |
|                | FAILURE.FOR       |                   |              |
|                | [employer], MULTIPLY THE  |                   |              |
|                | CALCULATED GFR BY        |                   |              |
|                | 1.210.This eGFR is       |                   |              |
|                | calculated using the     |                   |              |
 
|                | MDRD IDMS traceable      |                   |              |
|                | equation.Testing         |                   |              |
|                | performed at [facility], [address] |                   |              |
|                |  [facility],        |                   |              |
|                | [place]    [address]   |                   |              |
+----------------+--------------------------+-------------------+--------------+
 
 
 
+----------+
| Specimen |
+----------+
| Blood    |
+----------+
 
 
 
+---------------+-------------------------+---------------------+----------------+
| Performing    | Address                 | City/State/Zipcode  | Phone Number   |
| Organization  |                         |                     |                |
+---------------+-------------------------+---------------------+----------------+
|   [facility]  |   [address]  | [place] [address] |   [phone] |
| LABORATORY    | [address]                   |                     |                |
+---------------+-------------------------+---------------------+----------------+
 CBC w/auto diff (reflex to manual) (2019  4:53 AM)
 
+-----------------+--------------------------+-------------------+--------------+
| Component       | Value                    | Ref Range         | Performed At |
+-----------------+--------------------------+-------------------+--------------+
| WBC             | 3.39 (L)                 | 3.80 - 11.00 K/uL | [facility]   |
|                 |                          |                   | LABORATORY   |
+-----------------+--------------------------+-------------------+--------------+
| RBC             | 2.78 (L)                 | 3.70 - 5.10 M/uL  | [facility]   |
|                 |                          |                   | LABORATORY   |
+-----------------+--------------------------+-------------------+--------------+
| HGB             | 9.5 (L)                  | 11.3 - 15.5 g/dL  | [facility]   |
|                 |                          |                   | LABORATORY   |
+-----------------+--------------------------+-------------------+--------------+
| HCT             | 29.2 (L)                 | 34.0 - 46.0 %     | [facility]   |
|                 |                          |                   | LABORATORY   |
+-----------------+--------------------------+-------------------+--------------+
| MCV             | 104.7 (H)                | 80.0 - 100.0 fl   | [facility]   |
|                 |                          |                   | LABORATORY   |
+-----------------+--------------------------+-------------------+--------------+
| MCH             | 34.0                     | 27.0 - 34.0 pg    | [facility]   |
|                 |                          |                   | LABORATORY   |
+-----------------+--------------------------+-------------------+--------------+
| MCHC            | 32.5                     | 32.0 - 35.5 g/dL  | [facility]   |
|                 |                          |                   | LABORATORY   |
+-----------------+--------------------------+-------------------+--------------+
| RDW SD          | 63.0 (H)                 | 37 - 53 fl        | [facility]   |
|                 |                          |                   | LABORATORY   |
+-----------------+--------------------------+-------------------+--------------+
| PLT             | 125 (L)                  | 150 - 400 K/uL    | [facility]   |
|                 |                          |                   | LABORATORY   |
+-----------------+--------------------------+-------------------+--------------+
| MPV             | 9.4                      | fl                | [facility]   |
|                 |                          |                   | LABORATORY   |
+-----------------+--------------------------+-------------------+--------------+
| DIFF TYPE       | AUTOMATED                |                   | [facility]   |
 
|                 |                          |                   | LABORATORY   |
+-----------------+--------------------------+-------------------+--------------+
| NEUTROPHILS     | 66.95                    | %                 | [facility]   |
|                 |                          |                   | LABORATORY   |
+-----------------+--------------------------+-------------------+--------------+
| LYMPHOCYTES     | 20.34                    | %                 | [facility]   |
|                 |                          |                   | LABORATORY   |
+-----------------+--------------------------+-------------------+--------------+
| MONOCYTES       | 11.96                    | %                 | [facility]   |
|                 |                          |                   | LABORATORY   |
+-----------------+--------------------------+-------------------+--------------+
| EOSINOPHILS     | 0.04                     | %                 | [facility]   |
|                 |                          |                   | LABORATORY   |
+-----------------+--------------------------+-------------------+--------------+
| BASOPHILS       | 0.71                     | %                 | [facility]   |
|                 |                          |                   | LABORATORY   |
+-----------------+--------------------------+-------------------+--------------+
| NEUTROPHILS ABS | 2.27                     | 1.90 - 7.40 K/uL  | [facility]   |
|                 |                          |                   | LABORATORY   |
+-----------------+--------------------------+-------------------+--------------+
| LYMPHOCYTES ABS | 0.69 (L)                 | 1.00 - 3.90 K/uL  | [facility]   |
|                 |                          |                   | LABORATORY   |
+-----------------+--------------------------+-------------------+--------------+
| MONOCYTES ABS   | 0.41                     | 0.00 - 0.80 K/uL  | [facility]   |
|                 |                          |                   | LABORATORY   |
+-----------------+--------------------------+-------------------+--------------+
| EOSINOPHILS ABS | 0.00                     | 0.00 - 0.50 K/uL  | [facility]   |
|                 |                          |                   | LABORATORY   |
+-----------------+--------------------------+-------------------+--------------+
| BASOPHILS ABS   | 0.02Comment: Testing     | 0.00 - 0.10 K/uL  | [facility]   |
|                 | performed at [facility], [address] |                   | LABORATORY   |
|                 |  [address],        |                   |              |
|                 | [place]  [address]     |                   |              |
+-----------------+--------------------------+-------------------+--------------+
 
 
 
+----------+
| Specimen |
+----------+
| Blood    |
+----------+
 
 
 
+---------------+-------------------------+---------------------+----------------+
| Performing    | Address                 | City/State/Zipcode  | Phone Number   |
| Organization  |                         |                     |                |
+---------------+-------------------------+---------------------+----------------+
|   [facility]  |   [address]  | [place] [address] |   [phone] |
| LABORATORY    | [address]                   |                     |                |
+---------------+-------------------------+---------------------+----------------+
 POCT glucose (2019  9:00 PM)
 
+--------------------+--------------------------+---------------+-----------------+
| Component          | Value                    | Ref Range     | Performed At    |
+--------------------+--------------------------+---------------+-----------------+
| GLUCOSE,POC SCREEN | 148 (H)Comment: Testing  | 65 - 99 mg/dL | [facility] LAB[facility] |
|                    | performed at [facility];[address]     |               |                 |
|                    | [address];[place]   |               |                 |
 
|                    | [id number]                    |               |                 |
+--------------------+--------------------------+---------------+-----------------+
 
 
 
+-------------------+------------------+--------------------+--------------+
| Performing        | Address          | City/State/Zipcode | Phone Number |
| Organization      |                  |                    |              |
+-------------------+------------------+--------------------+--------------+
|   [facility] |   [address] | [place] [address] |              |
+-------------------+------------------+--------------------+--------------+
 IR stent femoral popliteal (2019  6:45 PM)
 
+------------------------------------------------------------------------+--------------+
| Impressions                                                            | Performed At |
+------------------------------------------------------------------------+--------------+
|      1. Aorta and iliac arteries were calcified but without            |   [facility]     |
| significant stenosis.  2. Right SFA with high-grade stenosis           | RADIOLOGY    |
| proximally with good endograft results after angioplasty and stenting. |              |
|   3. 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
| artery to right foot.  4. Right posterior tibial artery was            |              |
| chronically occluded.     Electronically signed by Kirt Mclaughlin MD on    |              |
| 2019 3:50 PM                                                      |              |
+------------------------------------------------------------------------+--------------+
 
 
 
+------------------------------------------------------------------------+--------------+
| Narrative                                                              | Performed At |
+------------------------------------------------------------------------+--------------+
|   LOWER EXTREMITY ARTERIOGRAM, UNILATERAL     PREOPERATIVE             |   [facility]     |
| DIAGNOSIS:    Critical limb ischemia of right lower extremity with     | RADIOLOGY    |
| poorly healing wound of right great toe     POSTOPERATIVE              |              |
| DIAGNOSIS:    Same     PROCEDURE:  1. Moderate conscious sedation  2.  |              |
| Ultrasound guided access of the left common femoral artery  3.         |              |
| Aortogram  4. Selective right common femoral artery catheterization    |              |
| with right leg runoff  5. Right SFA stenting using 6 x 80 mm           |              |
| self-expanding stent  6 Drug eluting balloon angioplasty of right SFA  |              |
| using 4 x 100 mm Lutonix balloon     SURGEON: Kirt Mclaughlin MD              |              |
| ASSISTANT: None     ANESTHESIA: Moderate sedation and local anesthesia |              |
|      Informed consent was obtained from the patient. Continuous        |              |
| cardiac monitoring was performed throughout the procedure.  Conscious  |              |
| sedation was provided by the nursing staff during the procedure under  |              |
| my supervision.  Sedation time: 34 minutes  Medications: 2 mg Versed   |              |
| IV, 50 mcg Fentanyl IV     ESTIMATED BLOOD LOSS: Minimal               |              |
| CONTRAST:    53 mL of Isovue 250     INDICATIONS:    See preoperative  |              |
| history and physical     FINDINGS:    Aorta and iliac arteries         |              |
| calcified but without significant stenosis. Right SFA with high-grade  |              |
| stenosis proximally. After angioplasty and stenting, good angiographic |              |
|  results. Good two-vessel runoff to right foot via right anterior      |              |
| tibial artery and peroneal artery. Right posterior tibial artery was   |              |
| chronically occluded.     DESCRIPTION OF PROCEDURE:    The patient was |              |
|  properly identified and brought to the Cath Lab. The patient was      |              |
| placed supine on the catheter table and patient was given moderate     |              |
| sedation by nursing staff under my supervision. Patient's bilateral    |              |
| groins were then prepped and draped in the usual sterile fashion.      |              |
| Local anesthetic was given to the left groin and the left common       |              |
| femoral artery was accessed under ultrasound guidance using a          |              |
| micropuncture needle. A micropuncture sheath was inserted over a wire  |              |
| and up sized to a 4 French sheath. A Omni flush catheter was then      |              |
 
| inserted into the distal aorta and aortogram was then performed with   |              |
| the findings as described above. The Omni Flush catheter was then used |              |
|  to guide a Glidewire into the right common femoral artery and then    |              |
| the catheter was then advanced over the wire. A right lower extremity  |              |
| runoff was then performed with the findings as described above.        |              |
| Next, an Amplatzer wire was then inserted over the catheter and the 4  |              |
| French sheath was removed. A 6 French destination sheath was then      |              |
| inserted over the wire with the tip of the sheath being placed into    |              |
| the right common femoral artery. A vertebral catheter was inserted     |              |
| over the wire and the wire was then removed. A Glidewire was then      |              |
| placed into the vertebral catheter and used to cross through the       |              |
| stenotic right SFA.    Next, a 260 fix core wire was then inserted     |              |
| over the catheter.    Right SFA was stented using 6 x 80 mm            |              |
| self-expanding stent. Drug eluting balloon angioplasty of the right    |              |
| SFA was then performed using 4 x 100 mm Lutonix balloon.     A final   |              |
| arteriogram was then performed through the sheath. The destination     |              |
| sheath was then removed and a Mynx closure device was then used on the |              |
|  left common femoral artery and hemostasis was ensured. The patient    |              |
| was then taken to the observation unit for further monitoring.         |              |
+------------------------------------------------------------------------+--------------+
 
 
 
+-------------------------------------------------------------------------------------------
--------------------------------------------------------------------------------------------
-----------------------------------+
| Procedure Note                                                                            
                                                                                            
                                   |
+-------------------------------------------------------------------------------------------
--------------------------------------------------------------------------------------------
-----------------------------------+
|   Denny, Rad Results In - 2019  8:40 AM PST  LOWER EXTREMITY ARTERIOGRAM,             
                                                                                            
                                   |
| UNILATERALPREOPERATIVE DIAGNOSIS:  Critical limb ischemia of right lower extremity with   
                                                                                            
                                   |
| poorly healing wound of right great toePOSTOPERATIVE DIAGNOSIS:  SamePROCEDURE:1.         
                                                                                            
                                   |
| Moderate conscious sedation2. Ultrasound guided access of the left common femoral         
                                                                                            
                                   |
| artery3. Aortogram4. Selective right common femoral artery catheterization with right     
                                                                                            
                                   |
| leg runoff5. Right SFA stenting using 6 x 80 mm self-expanding stent6 Drug eluting        
                                                                                            
                                   |
| balloon angioplasty of right SFA using 4 x 100 mm Lutonix balloonSURGEON: Kirt Mclaughlin,        
                                                                                            
                                   |
| MDASSISTANT: NoneANESTHESIA: Moderate sedation and local anesthesiaInformed consent was   
                                                                                            
                                   |
| obtained from the patient. Continuous cardiac monitoring was performed throughout the     
                                                                                            
                                   |
| procedure.Conscious sedation was provided by the nursing staff during the procedure       
 
                                                                                            
                                   |
| under my supervision.Sedation time: 34 minutesMedications: 2 mg Versed IV, 50 mcg         
                                                                                            
                                   |
| Fentanyl IVESTIMATED BLOOD LOSS: MinimalCONTRAST:  53 mL of Isovue 250INDICATIONS:  See   
                                                                                            
                                   |
| preoperative history and physicalFINDINGS:  Aorta and iliac arteries calcified but        
                                                                                            
                                   |
| without significant stenosis. Right SFA with high-grade stenosis proximally. After        
                                                                                            
                                   |
| angioplasty and stenting, good angiographic results. Good two-vessel runoff to right      
                                                                                            
                                   |
| foot via right anterior tibial artery and peroneal artery. Right posterior tibial artery  
                                                                                            
                                   |
|  was chronically occluded.DESCRIPTION OF PROCEDURE:  The patient was properly identified  
                                                                                            
                                   |
|  and brought to the Cath Lab. The patient was placed supine on the catheter table and     
                                                                                            
                                   |
| patient was given moderate sedation by nursing staff under my supervision. Patient's      
                                                                                            
                                   |
| bilateral groins were then prepped and draped in the usual sterile fashion. Local         
                                                                                            
                                   |
| anesthetic was given to the left groin and the left common femoral artery was accessed    
                                                                                            
                                   |
| under ultrasound guidance using a micropuncture needle. A micropuncture sheath was        
                                                                                            
                                   |
| inserted over a wire and up sized to a 4 French sheath. A Omni flush catheter was then    
                                                                                            
                                   |
| inserted into the distal aorta and aortogram was then performed with the findings as      
                                                                                            
                                   |
| described above. The Omni Flush catheter was then used to guide a Glidewire into the      
                                                                                            
                                   |
| right common femoral artery and then the catheter was then advanced over the wire. A      
                                                                                            
                                   |
| right lower extremity runoff was then performed with the findings as described            
                                                                                            
                                   |
| above.Next, an Amplatzer wire was then inserted over the catheter and the 4 French        
                                                                                            
                                   |
| sheath was removed. A 6 French destination sheath was then inserted over the wire with    
                                                                                            
                                   |
| the tip of the sheath being placed into the right common femoral artery. A vertebral      
 
                                                                                            
                                   |
| catheter was inserted over the wire and the wire was then removed. A Glidewire was then   
                                                                                            
                                   |
| placed into the vertebral catheter and used to cross through the stenotic right SFA.      
                                                                                            
                                   |
| Next, a 260 fix core wire was then inserted over the catheter.  Right SFA was stented     
                                                                                            
                                   |
| using 6 x 80 mm self-expanding stent. Drug eluting balloon angioplasty of the right SFA   
                                                                                            
                                   |
| was then performed using 4 x 100 mm Lutonix balloon.A final arteriogram was then          
                                                                                            
                                   |
| performed through the sheath. The destination sheath was then removed and a Mynx closure  
                                                                                            
                                   |
|  device was then used on the left common femoral artery and hemostasis was ensured. The   
                                                                                            
                                   |
| patient was then taken to the observation unit for further monitoring.IMPRESSION:1.       
                                                                                            
                                   |
| Aorta and iliac arteries were calcified but without significant stenosis.2. Right SFA     
                                                                                            
                                   |
| with high-grade stenosis proximally with good endograft results after angioplasty and     
                                                                                            
                                   |
| stenting.3. Two-vessel runoff via right anterior tibial artery and peroneal artery to     
                                                                                            
                                   |
| right foot.4. 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       
                                                                                            
                                   |
| signed by Kirt Mclaughlin MD on 2019 3:50 PM                                              
                                                                                            
                                   |
|                                                                                           
                                                                                            
                                   |
|A final arteriogram was then performed through the sheath. The destination sheath was then 
removed and a Mynx closure device was then used on the left common femoral artery and hemost
asis was ensured. The patient was  |
|then taken to the observation unit for further monitoring.                                 
                                                                                            
                                   |
|                                                                                           
                                                                                            
                                   |
|IMPRESSION:                                                                                
                                                                                            
                                  |
|                                                                                           
                                                                                            
                                   |
|1. Aorta and iliac arteries were calcified but without significant stenosis.               
 
                                                                                            
                                   |
|2. Right SFA with high-grade stenosis proximally with good endograft results after angiopla
sty and stenting.                                                                           
                                   |
|3. Two-vessel runoff via right anterior tibial artery and peroneal artery to right foot.   
                                                                                            
                                   |
|4. 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                                 
                                                                                            
                                   |
|                                                                                           
                                                                                            
                                   |
|Electronically signed by Kirt Mclaughlin MD on 2019 3:50 PM                                
                                                                                            
                                   |
+-------------------------------------------------------------------------------------------
--------------------------------------------------------------------------------------------
-----------------------------------+
 
 
 
+--------------------+------------------+--------------------+--------------+
| Performing         | Address          | City/State[facility] | Phone Number |
| Organization       |                  |                    |              |
+--------------------+------------------+--------------------+--------------+
|   [facility] |   [address] | [place] [address] |              |
+--------------------+------------------+--------------------+--------------+
 IR aortagram abdominal (2019  6:45 PM)
 
+------------------------------------------------------------------------+--------------+
| Impressions                                                            | Performed At |
+------------------------------------------------------------------------+--------------+
|      1. Aorta and iliac arteries were calcified but without            |   [facility]     |
| significant stenosis.  2. Right SFA with high-grade stenosis           | RADIOLOGY    |
| proximally with good endograft results after angioplasty and stenting. |              |
|   3. 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
| artery to right foot.  4. Right posterior tibial artery was            |              |
| chronically occluded.     Electronically signed by Kirt Mclaughlin MD on    |              |
| 2019 3:50 PM                                                      |              |
+------------------------------------------------------------------------+--------------+
 
 
 
+------------------------------------------------------------------------+--------------+
| Narrative                                                              | Performed At |
+------------------------------------------------------------------------+--------------+
|   LOWER EXTREMITY ARTERIOGRAM, UNILATERAL     PREOPERATIVE             |   [facility]     |
| DIAGNOSIS:    Critical limb ischemia of right lower extremity with     | RADIOLOGY    |
| poorly healing wound of right great toe     POSTOPERATIVE              |              |
| DIAGNOSIS:    Same     PROCEDURE:  1. Moderate conscious sedation  2.  |              |
| Ultrasound guided access of the left common femoral artery  3.         |              |
| Aortogram  4. Selective right common femoral artery catheterization    |              |
| with right leg runoff  5. Right SFA stenting using 6 x 80 mm           |              |
| self-expanding stent  6 Drug eluting balloon angioplasty of right SFA  |              |
| using 4 x 100 mm Lutonix balloon     SURGEON: Kirt Mclaughlin MD              |              |
| ASSISTANT: None     ANESTHESIA: Moderate sedation and local anesthesia |              |
|      Informed consent was obtained from the patient. Continuous        |              |
| cardiac monitoring was performed throughout the procedure.  Conscious  |              |
 
| sedation was provided by the nursing staff during the procedure under  |              |
| my supervision.  Sedation time: 34 minutes  Medications: 2 mg Versed   |              |
| IV, 50 mcg Fentanyl IV     ESTIMATED BLOOD LOSS: Minimal               |              |
| CONTRAST:    53 mL of Isovue 250     INDICATIONS:    See preoperative  |              |
| history and physical     FINDINGS:    Aorta and iliac arteries         |              |
| calcified but without significant stenosis. Right SFA with high-grade  |              |
| stenosis proximally. After angioplasty and stenting, good angiographic |              |
|  results. Good two-vessel runoff to right foot via right anterior      |              |
| tibial artery and peroneal artery. Right posterior tibial artery was   |              |
| chronically occluded.     DESCRIPTION OF PROCEDURE:    The patient was |              |
|  properly identified and brought to the Cath Lab. The patient was      |              |
| placed supine on the catheter table and patient was given moderate     |              |
| sedation by nursing staff under my supervision. Patient's bilateral    |              |
| groins were then prepped and draped in the usual sterile fashion.      |              |
| Local anesthetic was given to the left groin and the left common       |              |
| femoral artery was accessed under ultrasound guidance using a          |              |
| micropuncture needle. A micropuncture sheath was inserted over a wire  |              |
| and up sized to a 4 French sheath. A Omni flush catheter was then      |              |
| inserted into the distal aorta and aortogram was then performed with   |              |
| the findings as described above. The Omni Flush catheter was then used |              |
|  to guide a Glidewire into the right common femoral artery and then    |              |
| the catheter was then advanced over the wire. A right lower extremity  |              |
| runoff was then performed with the findings as described above.        |              |
| Next, an Amplatzer wire was then inserted over the catheter and the 4  |              |
| French sheath was removed. A 6 French destination sheath was then      |              |
| inserted over the wire with the tip of the sheath being placed into    |              |
| the right common femoral artery. A vertebral catheter was inserted     |              |
| over the wire and the wire was then removed. A Glidewire was then      |              |
| placed into the vertebral catheter and used to cross through the       |              |
| stenotic right SFA.    Next, a 260 fix core wire was then inserted     |              |
| over the catheter.    Right SFA was stented using 6 x 80 mm            |              |
| self-expanding stent. Drug eluting balloon angioplasty of the right    |              |
| SFA was then performed using 4 x 100 mm Lutonix balloon.     A final   |              |
| arteriogram was then performed through the sheath. The destination     |              |
| sheath was then removed and a Mynx closure device was then used on the |              |
|  left common femoral artery and hemostasis was ensured. The patient    |              |
| was then taken to the observation unit for further monitoring.         |              |
+------------------------------------------------------------------------+--------------+
 
 
 
+-------------------------------------------------------------------------------------------
--------------------------------------------------------------------------------------------
-----------------------------------+
| Procedure Note                                                                            
                                                                                            
                                   |
+-------------------------------------------------------------------------------------------
--------------------------------------------------------------------------------------------
-----------------------------------+
|   Denny, Rad Results In - 2019  8:40 AM PST  LOWER EXTREMITY ARTERIOGRAM,             
                                                                                            
                                   |
| UNILATERALPREOPERATIVE DIAGNOSIS:  Critical limb ischemia of right lower extremity with   
                                                                                            
                                   |
| poorly healing wound of right great toePOSTOPERATIVE DIAGNOSIS:  SamePROCEDURE:1.         
                                                                                            
                                   |
| Moderate conscious sedation2. Ultrasound guided access of the left common femoral         
 
                                                                                            
                                   |
| artery3. Aortogram4. Selective right common femoral artery catheterization with right     
                                                                                            
                                   |
| leg runoff5. Right SFA stenting using 6 x 80 mm self-expanding stent6 Drug eluting        
                                                                                            
                                   |
| balloon angioplasty of right SFA using 4 x 100 mm Lutonix balloonSURGEON: Kirt Mclaughlin,        
                                                                                            
                                   |
| MDASSISTANT: NoneANESTHESIA: Moderate sedation and local anesthesiaInformed consent was   
                                                                                            
                                   |
| obtained from the patient. Continuous cardiac monitoring was performed throughout the     
                                                                                            
                                   |
| procedure.Conscious sedation was provided by the nursing staff during the procedure       
                                                                                            
                                   |
| under my supervision.Sedation time: 34 minutesMedications: 2 mg Versed IV, 50 mcg         
                                                                                            
                                   |
| Fentanyl IVESTIMATED BLOOD LOSS: MinimalCONTRAST:  53 mL of Isovue 250INDICATIONS:  See   
                                                                                            
                                   |
| preoperative history and physicalFINDINGS:  Aorta and iliac arteries calcified but        
                                                                                            
                                   |
| without significant stenosis. Right SFA with high-grade stenosis proximally. After        
                                                                                            
                                   |
| angioplasty and stenting, good angiographic results. Good two-vessel runoff to right      
                                                                                            
                                   |
| foot via right anterior tibial artery and peroneal artery. Right posterior tibial artery  
                                                                                            
                                   |
|  was chronically occluded.DESCRIPTION OF PROCEDURE:  The patient was properly identified  
                                                                                            
                                   |
|  and brought to the Cath Lab. The patient was placed supine on the catheter table and     
                                                                                            
                                   |
| patient was given moderate sedation by nursing staff under my supervision. Patient's      
                                                                                            
                                   |
| bilateral groins were then prepped and draped in the usual sterile fashion. Local         
                                                                                            
                                   |
| anesthetic was given to the left groin and the left common femoral artery was accessed    
                                                                                            
                                   |
| under ultrasound guidance using a micropuncture needle. A micropuncture sheath was        
                                                                                            
                                   |
| inserted over a wire and up sized to a 4 French sheath. A Omni flush catheter was then    
                                                                                            
                                   |
| inserted into the distal aorta and aortogram was then performed with the findings as      
 
                                                                                            
                                   |
| described above. The Omni Flush catheter was then used to guide a Glidewire into the      
                                                                                            
                                   |
| right common femoral artery and then the catheter was then advanced over the wire. A      
                                                                                            
                                   |
| right lower extremity runoff was then performed with the findings as described            
                                                                                            
                                   |
| above.Next, an Amplatzer wire was then inserted over the catheter and the 4 French        
                                                                                            
                                   |
| sheath was removed. A 6 French destination sheath was then inserted over the wire with    
                                                                                            
                                   |
| the tip of the sheath being placed into the right common femoral artery. A vertebral      
                                                                                            
                                   |
| catheter was inserted over the wire and the wire was then removed. A Glidewire was then   
                                                                                            
                                   |
| placed into the vertebral catheter and used to cross through the stenotic right SFA.      
                                                                                            
                                   |
| Next, a 260 fix core wire was then inserted over the catheter.  Right SFA was stented     
                                                                                            
                                   |
| using 6 x 80 mm self-expanding stent. Drug eluting balloon angioplasty of the right SFA   
                                                                                            
                                   |
| was then performed using 4 x 100 mm Lutonix balloon.A final arteriogram was then          
                                                                                            
                                   |
| performed through the sheath. The destination sheath was then removed and a Mynx closure  
                                                                                            
                                   |
|  device was then used on the left common femoral artery and hemostasis was ensured. The   
                                                                                            
                                   |
| patient was then taken to the observation unit for further monitoring.IMPRESSION:1.       
                                                                                            
                                   |
| Aorta and iliac arteries were calcified but without significant stenosis.2. Right SFA     
                                                                                            
                                   |
| with high-grade stenosis proximally with good endograft results after angioplasty and     
                                                                                            
                                   |
| stenting.3. Two-vessel runoff via right anterior tibial artery and peroneal artery to     
                                                                                            
                                   |
| right foot.4. 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       
                                                                                            
                                   |
| signed by Kirt Mclaughlin MD on 2019 3:50 PM                                              
                                                                                            
                                   |
|                                                                                           
 
                                                                                            
                                   |
|A final arteriogram was then performed through the sheath. The destination sheath was then 
removed and a Mynx closure device was then used on the left common femoral artery and hemost
asis was ensured. The patient was  |
|then taken to the observation unit for further monitoring.                                 
                                                                                            
                                   |
|                                                                                           
                                                                                            
                                   |
|IMPRESSION:                                                                                
                                                                                            
                                  |
|                                                                                           
                                                                                            
                                   |
|1. Aorta and iliac arteries were calcified but without significant stenosis.               
                                                                                            
                                   |
|2. Right SFA with high-grade stenosis proximally with good endograft results after angiopla
sty and stenting.                                                                           
                                   |
|3. Two-vessel runoff via right anterior tibial artery and peroneal artery to right foot.   
                                                                                            
                                   |
|4. 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                                 
                                                                                            
                                   |
|                                                                                           
                                                                                            
                                   |
|Electronically signed by Kirt Mclaughlin MD on 2019 3:50 PM                                
                                                                                            
                                   |
+-------------------------------------------------------------------------------------------
--------------------------------------------------------------------------------------------
-----------------------------------+
 
 
 
+--------------------+------------------+--------------------+--------------+
| Performing         | Address          | City/State/Zipcode | Phone Number |
| Organization       |                  |                    |              |
+--------------------+------------------+--------------------+--------------+
|   [facility] |   [address] | [place] [address] |              |
+--------------------+------------------+--------------------+--------------+
 IR angiogram extremity right (2019  6:45 PM)
 
+------------------------------------------------------------------------+--------------+
| Impressions                                                            | Performed At |
+------------------------------------------------------------------------+--------------+
|      1. Aorta and iliac arteries were calcified but without            |   [facility]     |
| significant stenosis.  2. Right SFA with high-grade stenosis           | RADIOLOGY    |
| proximally with good endograft results after angioplasty and stenting. |              |
|   3. 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
| artery to right foot.  4. Right posterior tibial artery was            |              |
| chronically occluded.     Electronically signed by Kirt Mclaughlin MD on    |              |
| 2019 3:50 PM                                                      |              |
+------------------------------------------------------------------------+--------------+
 
 
 
 
+------------------------------------------------------------------------+--------------+
| Narrative                                                              | Performed At |
+------------------------------------------------------------------------+--------------+
|   LOWER EXTREMITY ARTERIOGRAM, UNILATERAL     PREOPERATIVE             |   [facility]     |
| DIAGNOSIS:    Critical limb ischemia of right lower extremity with     | RADIOLOGY    |
| poorly healing wound of right great toe     POSTOPERATIVE              |              |
| DIAGNOSIS:    Same     PROCEDURE:  1. Moderate conscious sedation  2.  |              |
| Ultrasound guided access of the left common femoral artery  3.         |              |
| Aortogram  4. Selective right common femoral artery catheterization    |              |
| with right leg runoff  5. Right SFA stenting using 6 x 80 mm           |              |
| self-expanding stent  6 Drug eluting balloon angioplasty of right SFA  |              |
| using 4 x 100 mm Lutonix balloon     SURGEON: Kirt Mclaughlin MD              |              |
| ASSISTANT: None     ANESTHESIA: Moderate sedation and local anesthesia |              |
|      Informed consent was obtained from the patient. Continuous        |              |
| cardiac monitoring was performed throughout the procedure.  Conscious  |              |
| sedation was provided by the nursing staff during the procedure under  |              |
| my supervision.  Sedation time: 34 minutes  Medications: 2 mg Versed   |              |
| IV, 50 mcg Fentanyl IV     ESTIMATED BLOOD LOSS: Minimal               |              |
| CONTRAST:    53 mL of Isovue 250     INDICATIONS:    See preoperative  |              |
| history and physical     FINDINGS:    Aorta and iliac arteries         |              |
| calcified but without significant stenosis. Right SFA with high-grade  |              |
| stenosis proximally. After angioplasty and stenting, good angiographic |              |
|  results. Good two-vessel runoff to right foot via right anterior      |              |
| tibial artery and peroneal artery. Right posterior tibial artery was   |              |
| chronically occluded.     DESCRIPTION OF PROCEDURE:    The patient was |              |
|  properly identified and brought to the Cath Lab. The patient was      |              |
| placed supine on the catheter table and patient was given moderate     |              |
| sedation by nursing staff under my supervision. Patient's bilateral    |              |
| groins were then prepped and draped in the usual sterile fashion.      |              |
| Local anesthetic was given to the left groin and the left common       |              |
| femoral artery was accessed under ultrasound guidance using a          |              |
| micropuncture needle. A micropuncture sheath was inserted over a wire  |              |
| and up sized to a 4 French sheath. A Omni flush catheter was then      |              |
| inserted into the distal aorta and aortogram was then performed with   |              |
| the findings as described above. The Omni Flush catheter was then used |              |
|  to guide a Glidewire into the right common femoral artery and then    |              |
| the catheter was then advanced over the wire. A right lower extremity  |              |
| runoff was then performed with the findings as described above.        |              |
| Next, an Amplatzer wire was then inserted over the catheter and the 4  |              |
| French sheath was removed. A 6 French destination sheath was then      |              |
| inserted over the wire with the tip of the sheath being placed into    |              |
| the right common femoral artery. A vertebral catheter was inserted     |              |
| over the wire and the wire was then removed. A Glidewire was then      |              |
| placed into the vertebral catheter and used to cross through the       |              |
| stenotic right SFA.    Next, a 260 fix core wire was then inserted     |              |
| over the catheter.    Right SFA was stented using 6 x 80 mm            |              |
| self-expanding stent. Drug eluting balloon angioplasty of the right    |              |
| SFA was then performed using 4 x 100 mm Lutonix balloon.     A final   |              |
| arteriogram was then performed through the sheath. The destination     |              |
| sheath was then removed and a Mynx closure device was then used on the |              |
|  left common femoral artery and hemostasis was ensured. The patient    |              |
| was then taken to the observation unit for further monitoring.         |              |
+------------------------------------------------------------------------+--------------+
 
 
 
+-------------------------------------------------------------------------------------------
 
--------------------------------------------------------------------------------------------
-----------------------------------+
| Procedure Note                                                                            
                                                                                            
                                   |
+-------------------------------------------------------------------------------------------
--------------------------------------------------------------------------------------------
-----------------------------------+
|   [facility]uro Baldwin Results In - 2019  8:40 AM PST  LOWER EXTREMITY ARTERIOGRAM,             
                                                                                            
                                   |
| UNILATERALPREOPERATIVE DIAGNOSIS:  Critical limb ischemia of right lower extremity with   
                                                                                            
                                   |
| poorly healing wound of right great toePOSTOPERATIVE DIAGNOSIS:  SamePROCEDURE:1.         
                                                                                            
                                   |
| Moderate conscious sedation2. Ultrasound guided access of the left common femoral         
                                                                                            
                                   |
| artery3. Aortogram4. Selective right common femoral artery catheterization with right     
                                                                                            
                                   |
| leg runoff5. Right SFA stenting using 6 x 80 mm self-expanding stent6 Drug eluting        
                                                                                            
                                   |
| balloon angioplasty of right SFA using 4 x 100 mm Lutonix balloonSURGEON: Kirt Mclaughlin,        
                                                                                            
                                   |
| MDASSISTANT: NoneANESTHESIA: Moderate sedation and local anesthesiaInformed consent was   
                                                                                            
                                   |
| obtained from the patient. Continuous cardiac monitoring was performed throughout the     
                                                                                            
                                   |
| procedure.Conscious sedation was provided by the nursing staff during the procedure       
                                                                                            
                                   |
| under my supervision.Sedation time: 34 minutesMedications: 2 mg Versed IV, 50 mcg         
                                                                                            
                                   |
| Fentanyl IVESTIMATED BLOOD LOSS: MinimalCONTRAST:  53 mL of Isovue 250INDICATIONS:  See   
                                                                                            
                                   |
| preoperative history and physicalFINDINGS:  Aorta and iliac arteries calcified but        
                                                                                            
                                   |
| without significant stenosis. Right SFA with high-grade stenosis proximally. After        
                                                                                            
                                   |
| angioplasty and stenting, good angiographic results. Good two-vessel runoff to right      
                                                                                            
                                   |
| foot via right anterior tibial artery and peroneal artery. Right posterior tibial artery  
                                                                                            
                                   |
|  was chronically occluded.DESCRIPTION OF PROCEDURE:  The patient was properly identified  
                                                                                            
                                   |
|  and brought to the Cath Lab. The patient was placed supine on the catheter table and     
 
                                                                                            
                                   |
| patient was given moderate sedation by nursing staff under my supervision. Patient's      
                                                                                            
                                   |
| bilateral groins were then prepped and draped in the usual sterile fashion. Local         
                                                                                            
                                   |
| anesthetic was given to the left groin and the left common femoral artery was accessed    
                                                                                            
                                   |
| under ultrasound guidance using a micropuncture needle. A micropuncture sheath was        
                                                                                            
                                   |
| inserted over a wire and up sized to a 4 French sheath. A Omni flush catheter was then    
                                                                                            
                                   |
| inserted into the distal aorta and aortogram was then performed with the findings as      
                                                                                            
                                   |
| described above. The Omni Flush catheter was then used to guide a Glidewire into the      
                                                                                            
                                   |
| right common femoral artery and then the catheter was then advanced over the wire. A      
                                                                                            
                                   |
| right lower extremity runoff was then performed with the findings as described            
                                                                                            
                                   |
| above.Next, an Amplatzer wire was then inserted over the catheter and the 4 French        
                                                                                            
                                   |
| sheath was removed. A 6 French destination sheath was then inserted over the wire with    
                                                                                            
                                   |
| the tip of the sheath being placed into the right common femoral artery. A vertebral      
                                                                                            
                                   |
| catheter was inserted over the wire and the wire was then removed. A Glidewire was then   
                                                                                            
                                   |
| placed into the vertebral catheter and used to cross through the stenotic right SFA.      
                                                                                            
                                   |
| Next, a 260 fix core wire was then inserted over the catheter.  Right SFA was stented     
                                                                                            
                                   |
| using 6 x 80 mm self-expanding stent. Drug eluting balloon angioplasty of the right SFA   
                                                                                            
                                   |
| was then performed using 4 x 100 mm Lutonix balloon.A final arteriogram was then          
                                                                                            
                                   |
| performed through the sheath. The destination sheath was then removed and a Mynx closure  
                                                                                            
                                   |
|  device was then used on the left common femoral artery and hemostasis was ensured. The   
                                                                                            
                                   |
| patient was then taken to the observation unit for further monitoring.IMPRESSION:1.       
 
                                                                                            
                                   |
| Aorta and iliac arteries were calcified but without significant stenosis.2. Right SFA     
                                                                                            
                                   |
| with high-grade stenosis proximally with good endograft results after angioplasty and     
                                                                                            
                                   |
| stenting.3. Two-vessel runoff via right anterior tibial artery and peroneal artery to     
                                                                                            
                                   |
| right foot.4. 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       
                                                                                            
                                   |
| signed by Kirt Mclaughlin MD on 2019 3:50 PM                                              
                                                                                            
                                   |
|                                                                                           
                                                                                            
                                   |
|A final arteriogram was then performed through the sheath. The destination sheath was then 
removed and a Mynx closure device was then used on the left common femoral artery and hemost
asis was ensured. The patient was  |
|then taken to the observation unit for further monitoring.                                 
                                                                                            
                                   |
|                                                                                           
                                                                                            
                                   |
|IMPRESSION:                                                                                
                                                                                            
                                  |
|                                                                                           
                                                                                            
                                   |
|1. Aorta and iliac arteries were calcified but without significant stenosis.               
                                                                                            
                                   |
|2. Right SFA with high-grade stenosis proximally with good endograft results after angiopla
sty and stenting.                                                                           
                                   |
|3. Two-vessel runoff via right anterior tibial artery and peroneal artery to right foot.   
                                                                                            
                                   |
|4. 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                                 
                                                                                            
                                   |
|                                                                                           
                                                                                            
                                   |
|Electronically signed by Kirt Mclaughlin MD on 2019 3:50 PM                                
                                                                                            
                                   |
+-------------------------------------------------------------------------------------------
--------------------------------------------------------------------------------------------
-----------------------------------+
 
 
 
+--------------------+------------------+--------------------+--------------+
 
| Performing         | Address          | City/State/Zipcode | Phone Number |
| Organization       |                  |                    |              |
+--------------------+------------------+--------------------+--------------+
|   [facility] |   [address] | [place] [address] |              |
+--------------------+------------------+--------------------+--------------+
 Blood Culture Set 2 (2019  6:05 PM)
 
+----------------------+------------------------+-----------+--------------+
| Component            | Value                  | Ref Range | Performed At |
+----------------------+------------------------+-----------+--------------+
| Specimen Description | BLOOD, PERIPHERAL DRAW |           | [facility]   |
|                      |                        |           | LABORATORY   |
+----------------------+------------------------+-----------+--------------+
| CULTURE              | NO GROWTH 6 DAYS       |           | [facility]   |
|                      |                        |           | LABORATORY   |
+----------------------+------------------------+-----------+--------------+
 
 
 
+-----------------+
| Specimen        |
+-----------------+
| Blood - Blood,  |
| peripheral draw |
+-----------------+
 
 
 
+---------------+-------------------------+---------------------+----------------+
| Performing    | Address                 | City/State/Zipcode  | Phone Number   |
| Organization  |                         |                     |                |
+---------------+-------------------------+---------------------+----------------+
|   [facility]  |   [address]  | [place] [address] |   [phone] |
| LABORATORY    | [address]                   |                     |                |
+---------------+-------------------------+---------------------+----------------+
 IR guidance vascular access US (2019  5:40 PM)
 
+------------------------------------------------------------------------+--------------+
| Narrative                                                              | Performed At |
+------------------------------------------------------------------------+--------------+
|   This Point of Care (POC) ultrasound image has been reviewed and      |   [facility]     |
| interpreted by the physician identified as the performing physician in | RADIOLOGY    |
|  the   associated interpretation and report.                           |              |
+------------------------------------------------------------------------+--------------+
 
 
 
+--------------------+------------------+--------------------+--------------+
| Performing         | Address          | City/State/Zipcode | Phone Number |
| Organization       |                  |                    |              |
+--------------------+------------------+--------------------+--------------+
|   [facility] |   [address] | [place] [address] |              |
+--------------------+------------------+--------------------+--------------+
 C-reactive protein (2019  3:43 PM)
 
+-----------+--------------------------+------------+-----------------+
| Component | Value                    | Ref Range  | Performed At    |
+-----------+--------------------------+------------+-----------------+
| CRP       | 0.6 (H)Comment: Testing  | <0.5 mg/dL | [facility] |
|           | performed at [facility];[address]     |            |                 |
 
|           | [address];[place]   |            |                 |
|           | [id number]                    |            |                 |
+-----------+--------------------------+------------+-----------------+
 
 
 
+-------------------+
| Specimen          |
+-------------------+
| Blood - Arm, Left |
+-------------------+
 
 
 
+-------------------+------------------+--------------------+--------------+
| Performing        | Address          | City/State/Zipcode | Phone Number |
| Organization      |                  |                    |              |
+-------------------+------------------+--------------------+--------------+
|   [facility] |   [address] | [place] [address] |              |
+-------------------+------------------+--------------------+--------------+
 Sedimentation rate, automated (2019  3:43 PM)
 
+-----------+-------------------------+--------------+-----------------+
| Component | Value                   | Ref Range    | Performed At    |
+-----------+-------------------------+--------------+-----------------+
| ESR       | 13Comment: Testing      | 0 - 30 mm/Hr | [facility] |
|           | performed at [facility];[address]    |              |                 |
|           | [address];[place]  |              |                 |
|           | [id number]                   |              |                 |
+-----------+-------------------------+--------------+-----------------+
 
 
 
+-------------------+
| Specimen          |
+-------------------+
| Blood - Arm, Left |
+-------------------+
 
 
 
+-------------------+------------------+--------------------+--------------+
| Performing        | Address          | City/State/Zipcode | Phone Number |
| Organization      |                  |                    |              |
+-------------------+------------------+--------------------+--------------+
|   [facility] |   [address] | [place] [address] |              |
+-------------------+------------------+--------------------+--------------+
 CPK (2019  3:43 PM)
 
+-----------+-------------------------+--------------+-----------------+
| Component | Value                   | Ref Range    | Performed At    |
+-----------+-------------------------+--------------+-----------------+
| CPK       | 28 (L)Comment: Testing  | 30 - 240 U/L | [facility] |
|           | performed at [facility];[address]    |              |                 |
|           | [address];[place]  |              |                 |
|           | [id number]                   |              |                 |
+-----------+-------------------------+--------------+-----------------+
 
 
 
 
+-------------------+
| Specimen          |
+-------------------+
| Blood - Arm, Left |
+-------------------+
 
 
 
+-------------------+------------------+--------------------+--------------+
| Performing        | Address          | City/State/Zipcode | Phone Number |
| Organization      |                  |                    |              |
+-------------------+------------------+--------------------+--------------+
|   [facility] |   [address] | [place] [address] |              |
+-------------------+------------------+--------------------+--------------+
 Comprehensive metabolic panel (2019  3:43 PM)
 
+----------------+--------------------------+-------------------+-----------------+
| Component      | Value                    | Ref Range         | Performed At    |
+----------------+--------------------------+-------------------+-----------------+
| SODIUM         | 139                      | 135 - 145 mmol/L  | [facility] |
+----------------+--------------------------+-------------------+-----------------+
| POTASSIUM      | 4.0                      | 3.5 - 4.9 mmol/L  | [facility] |
+----------------+--------------------------+-------------------+-----------------+
| CHLORIDE       | 100                      | 99 - 109 mmol/L   | [facility] |
+----------------+--------------------------+-------------------+-----------------+
| CO2            | 29                       | 23 - 32 mmol/L    | [facility] |
+----------------+--------------------------+-------------------+-----------------+
| ANION GAP AGAP | 14                       | 5 - 20 mmol/L     | [facility] |
+----------------+--------------------------+-------------------+-----------------+
| GLUCOSE        | 202 (H)                  | 65 - 99 mg/dL     | [facility] |
+----------------+--------------------------+-------------------+-----------------+
| BUN            | 23                       | 8 - 25 mg/dL      | [facility] |
+----------------+--------------------------+-------------------+-----------------+
| CREATININE     | 6.1 (H)                  | 0.50 - 1.00 mg/dL | [facility] |
+----------------+--------------------------+-------------------+-----------------+
| BUN/CREAT      | 4                        |                   | [facility] |
+----------------+--------------------------+-------------------+-----------------+
| CALCIUM        | 9.0                      | 8.5 - 10.5 mg/dL  | [facility] |
+----------------+--------------------------+-------------------+-----------------+
| TOTAL PROTEIN  | 6.2 (L)                  | 6.3 - 8.2 g/dL    | [facility] |
+----------------+--------------------------+-------------------+-----------------+
| Albumin        | 2.7 (L)                  | 3.3 - 4.8 g/dL    | [facility] |
+----------------+--------------------------+-------------------+-----------------+
| GLOBULIN       | 3.5                      | 1.3 - 4.9 g/dL    | [facility] |
+----------------+--------------------------+-------------------+-----------------+
| A/G            | 0.8 (L)                  | 1.0 - 2.4         | [facility] |
+----------------+--------------------------+-------------------+-----------------+
| TBIL           | 0.4                      | 0.1 - 1.5 mg/dL   | [facility] |
+----------------+--------------------------+-------------------+-----------------+
| ALK PHOS       | 110                      | 35 - 115 U/L      | [facility] |
+----------------+--------------------------+-------------------+-----------------+
| AST            | 24                       | 10 - 45 U/L       | [facility] |
+----------------+--------------------------+-------------------+-----------------+
| ALT            | 21                       | 10 - 65 U/L       | [facility] |
+----------------+--------------------------+-------------------+-----------------+
| EGFR           | 7 (L)Comment: GFR <60:   | >60 mL/min/1.73m2 | [facility] |
|                | CHRONIC KIDNEY DISEASE,  |                   |                 |
|                | IF FOUND OVER A 3 MONTH  |                   |                 |
|                | PERIOD.GFR <15: KIDNEY   |                   |                 |
|                | FAILURE.FOR       |                   |                 |
 
|                | [employer], MULTIPLY THE  |                   |                 |
|                | CALCULATED GFR BY        |                   |                 |
|                | 1.210.This eGFR is       |                   |                 |
|                | calculated using the     |                   |                 |
|                | MDRD IDMS traceable      |                   |                 |
|                | equation.Testing         |                   |                 |
|                | performed at [facility];[address]     |                   |                 |
|                | [address];[place], [place]   |                   |                 |
|                | [id number]                    |                   |                 |
+----------------+--------------------------+-------------------+-----------------+
 
 
 
+-------------------+
| Specimen          |
+-------------------+
| Blood - Arm, Left |
+-------------------+
 
 
 
+-------------------+------------------+--------------------+--------------+
| Performing        | Address          | City/State/Zipcode | Phone Number |
| Organization      |                  |                    |              |
+-------------------+------------------+--------------------+--------------+
|   [facility] |   [address] | [place] [address] |              |
+-------------------+------------------+--------------------+--------------+
 Blood Culture Set 1 (2019  3:43 PM)
 
+----------------------+------------------+-----------+-----------------+
| Component            | Value            | Ref Range | Performed At    |
+----------------------+------------------+-----------+-----------------+
| Specimen Description | BLOOD            |           | [facility]      |
|                      |                  |           | LABORATORY      |
+----------------------+------------------+-----------+-----------------+
| SPECIAL REQUESTS     | RAC              |           | [facility] |
+----------------------+------------------+-----------+-----------------+
| CULTURE              | NO GROWTH 6 DAYS |           | [facility]      |
|                      |                  |           | LABORATORY      |
+----------------------+------------------+-----------+-----------------+
 
 
 
+----------------+
| Specimen       |
+----------------+
| Blood - Blood  |
+----------------+
 
 
 
+-------------------+-------------------------+---------------------+----------------+
| Performing        | Address                 | City/State/Zipcode  | Phone Number   |
| Organization      |                         |                     |                |
+-------------------+-------------------------+---------------------+----------------+
|   [facility]      |   [address]  | [place] [address] |   [phone] |
| LABORATORY        | Blvd.                   |                     |                |
+-------------------+-------------------------+---------------------+----------------+
|   [facility] |   [address]        | [place] [address]  |                |
+-------------------+-------------------------+---------------------+----------------+
 
 CBC w/manual diff (2019  3:43 PM)
 
+----------------------+-------------------------+-------------------+-----------------+
| Component            | Value                   | Ref Range         | Performed At    |
+----------------------+-------------------------+-------------------+-----------------+
| WBC                  | 5.53Comment:            | 3.80 - 11.00 K/uL | [facility] |
+----------------------+-------------------------+-------------------+-----------------+
| RBC                  | 2.76 (L)                | 3.70 - 5.10 M/uL  | [facility] |
+----------------------+-------------------------+-------------------+-----------------+
| HGB                  | 9.4 (L)                 | 11.3 - 15.5 g/dL  | [facility] |
+----------------------+-------------------------+-------------------+-----------------+
| HCT                  | 28.4 (L)                | 34.0 - 46.0 %     | [facility] |
+----------------------+-------------------------+-------------------+-----------------+
| MCV                  | 102.9 (H)               | 80.0 - 100.0 fl   | [facility] |
+----------------------+-------------------------+-------------------+-----------------+
| MCH                  | 34.1 (H)                | 27.0 - 34.0 pg    | [facility] |
+----------------------+-------------------------+-------------------+-----------------+
| MCHC                 | 33.2                    | 32.0 - 35.5 g/dL  | [facility] |
+----------------------+-------------------------+-------------------+-----------------+
| RDW SD               | 61.3 (H)                | 37 - 53 fl        | [facility] |
+----------------------+-------------------------+-------------------+-----------------+
| PLT                  | 147 (L)Comment:         | 150 - 400 K/uL    | [facility] |
+----------------------+-------------------------+-------------------+-----------------+
| MPV                  | 9.3Comment:             | fl                | [facility] |
+----------------------+-------------------------+-------------------+-----------------+
| DIFF TYPE            | MANUAL                  |                   | [facility] |
+----------------------+-------------------------+-------------------+-----------------+
| Neutrophils Manual   | 64                      | %                 | [facility] |
+----------------------+-------------------------+-------------------+-----------------+
| Lymphocytes Manual   | 28                      | %                 | [facility] |
+----------------------+-------------------------+-------------------+-----------------+
| Monocytes Manual     | 8                       | %                 | [facility] |
+----------------------+-------------------------+-------------------+-----------------+
| Neutrophils Absolute | 3.54                    | 1.90 - 7.40 K/uL  | [facility] |
+----------------------+-------------------------+-------------------+-----------------+
| Lymphocytes Absolute | 1.55                    | 1.00 - 3.90 K/uL  | [facility] |
+----------------------+-------------------------+-------------------+-----------------+
| Monocytes Absolute   | 0.44                    | 0.00 - 0.80 K/uL  | [facility] |
+----------------------+-------------------------+-------------------+-----------------+
| MORPHOLOGY           | 1+Comment: ANISONORMAL  |                   | [facility] |
|                      | PLT MORPHTesting        |                   |                 |
|                      | performed at [facility]    |                   |                 |
|                      | [address];[place], [place]  |                   |                 |
|                      | [id number]                   |                   |                 |
|                      |                         |                   |                 |
+----------------------+-------------------------+-------------------+-----------------+
 
 
 
+-------------------+
| Specimen          |
+-------------------+
| Blood - Arm, Left |
+-------------------+
 
 
 
+-------------------+------------------+--------------------+--------------+
| Performing        | Address          | City/State/Zipcode | Phone Number |
| Organization      |                  |                    |              |
 
+-------------------+------------------+--------------------+--------------+
|   [facility] |   [address] | [place] [address] |              |
+-------------------+------------------+--------------------+--------------+
 US lower extremty  arterial right (2019  2:23 PM)
 
+------------------------------------------------------------------------+--------------+
| Impressions                                                            | Performed At |
+------------------------------------------------------------------------+--------------+
|   1. Right leg shows biphasic inflow with a greater than 50% stenosis  |   [facility]     |
| at  the origin of the superficial femoral artery                       | RADIOLOGY    |
| (137-309).    Biphasic  outflow.  2. Two vessel runoff to the right    |              |
| foot.  Signed by: Eugenio Hoffman  Sign Date/Time: 2019 3:11 PM  |              |
+------------------------------------------------------------------------+--------------+
 
 
 
+------------------------------------------------------------------------+--------------+
| Narrative                                                              | Performed At |
+------------------------------------------------------------------------+--------------+
|   LOWER EXTREMITY ARTERIAL EXAM WITH IMAGING  CLINICAL INFORMATION:    |   [facility]     |
| The patient is a 68-year-old female presenting to the vascular lab     | RADIOLOGY    |
| with  complaints of right foot nonhealing wound.    We have been asked |              |
|  to  evaluate for peripheral arterial disease.  COMPARISON:  None      |              |
| PROCEDURE:  Imaging of the right lower extremity arteries was          |              |
| performed using both  color Doppler and spectral Doppler techniques    |              |
| with a 9 megahertz linear  array transducer.  FINDINGS:  RIGHT  CFA    |              |
| prox 137 biphasic  DFA prox 71 biphasic  SFA prox 309 biphasic  SFA    |              |
| mid 91 biphasic  SFA distal 127 biphasic  POP mid 96 biphasic  GIL     |              |
| prox 69 biphasic  GIL distal 145 biphasic  PTA prox 50 biphasic  PTA   |              |
| distal 58 biphasic  WINIFRED prox 74 biphasic  WINIFRED distal 0    absent     |              |
+------------------------------------------------------------------------+--------------+
 
 
 
+------------------------------------------------------------------------------------------+
| Procedure Note                                                                           |
+------------------------------------------------------------------------------------------+
|   Lauro Baldwin Results In - 2019  3:14 PM PST  LOWER EXTREMITY ARTERIAL EXAM WITH      |
| IMAGINGCLINICAL INFORMATION:The patient is a 68-year-old female presenting to the        |
| vascular lab withcomplaints of right foot nonhealing wound.  We have been asked          |
| toevaluate for peripheral arterial disease.COMPARISON:NonePROCEDURE:Imaging of the right |
|  lower extremity arteries was performed using bothcolor Doppler and spectral Doppler     |
| techniques with a 9 megahertz lineararray transducer.FINDINGS:RIGHTCFA prox 137          |
| biphasicDFA prox 71 biphasicSFA prox 309 biphasicSFA mid 91 biphasicSFA distal 127       |
| biphasicPOP mid 96 biphasicATA prox 69 biphasicATA distal 145 biphasicPTA prox 50        |
| biphasicPTA distal 58 biphasicPERA prox 74 biphasicPERA distal 0  absentIMPRESSION:1.    |
| Right leg shows biphasic inflow with a greater than 50% stenosis atthe origin of the     |
| superficial femoral artery (137-309).  Biphasicoutflow.2. Two vessel runoff to the right |
|  foot.Signed by: Jamilah Hoffman Date/Time: 2019 3:11 PM                       |
|RIGHT                                                                                    |
|CFA prox 137 biphasic                                                                     |
|DFA prox 71 biphasic                                                                      |
|SFA prox 309 biphasic                                                                     |
|SFA mid 91 biphasic                                                                       |
|SFA distal 127 biphasic                                                                   |
|POP mid 96 biphasic                                                                       |
|GIL prox 69 biphasic                                                                      |
|GIL distal 145 biphasic                                                                   |
|PTA prox 50 biphasic                                                                      |
|PTA distal 58 biphasic                                                                    |
 
|WINIFRED prox 74 biphasic                                                                     |
|WINIFRED distal 0  absent                                                                     |
|IMPRESSION:                                                                              |
|1. Right leg shows biphasic inflow with a greater than 50% stenosis at                    |
|the origin of the superficial femoral artery (137-309).  Biphasic                         |
|outflow.                                                                                 |
|2. Two vessel runoff to the right foot.                                                   |
|Signed by: Eugenio Hoffman                                                                |
|Sign Date/Time: 2019 3:11 PM                                                        |
+------------------------------------------------------------------------------------------+
 
 
 
+--------------------+------------------+--------------------+--------------+
| Performing         | Address          | City/State/Zipcode | Phone Number |
| Organization       |                  |                    |              |
+--------------------+------------------+--------------------+--------------+
|   [facility] |   [address] | [place] [address] |              |
+--------------------+------------------+--------------------+--------------+
 ED INFORMATION EXCHANGE (2019  1:03 PM)
 
+------------------------------------------------------------------------+--------------+
| Narrative                                                              | Performed At |
+------------------------------------------------------------------------+--------------+
|   [id number] [id number]     Upcoming Changes to the [facility]     |   ED         |
| Notification  On 2019 the layout of this notification will | INFORMATION  |
|  be updated. For an overview of upcoming changes, please log into      | EXCHANGE     |
| [email or web address]. For questions,       |              |
| please email [email or web address] or call [phone].     |              |
|  This patient has registered at the [facility]     |              |
| Emergency Department   For more information visit:                     |              |
| [email or web address] |              |
| [id number]   Security Events  No recent Security Events currently on file  |              |
|     ED Care Guidelines from [employer] [facility] [place]  Last Updated: 18 |              |
|  10:16 AM         Other Information:  Currently being seen by Myranda |              |
|  in [place] for mental health concerns.[phone]  These are       |              |
| guidelines and the provider should exercise clinical judgment when     |              |
| providing care.  Additional guidelines are also available online from  |              |
| the following facilities: [facility]   Recent Emergency      |              |
| Department Visit Summary  Date Facility City State Type Major Type     |              |
| Diagnoses or Chief Complaint   2019 [facility]       |              |
| [place] Emergency    Emergency       2019 [facility]    |              |
| [facility] [place] Emergency    Emergency          Chest Pain              |              |
| Nausea        Shortness of Breath        Chest pain, unspecified       |              |
|      Elevated blood-pressure reading, without diagnosis of             |              |
| hypertension        Presence of aortocoronary bypass graft             |              |
| Other specified postprocedural states      2019 [facility]  |              |
| Health [place] Emergency    Emergency          CHEST PAIN            |              |
| Abnormal levels of other serum enzymes        Personal history of      |              |
| other diseases of the circulatory system        Chest pain,            |              |
| unspecified      2019 [facility] [place]            |              |
| Emergency    Emergency          FISTULA BLEEDING        Hemorrhage due |              |
|  to vascular prosthetic devices, implants and grafts, initial          |              |
| encounter      Dec 22, 2018 [facility] [place]             |              |
| Emergency    Emergency          CHEST PAIN        Type 2 diabetes      |              |
| mellitus with hyperglycemia        Chest pain, unspecified      Nov    |              |
| 2018 [place] Emergency    Emergency    Chief |              |
|  Complaint: SOB      2018 [facility] [place]       |              |
| Emergency    Emergency          FALL        Unspecified fall, initial  |              |
| encounter        Dependence on renal dialysis        End stage renal   |              |
 
| disease      2018 [facility] [place]               |              |
| Emergency    Emergency          FALL        Essential (primary)        |              |
| hypertension        Type 2 diabetes mellitus with hyperglycemia        |              |
|  Type 2 diabetes mellitus with unspecified complications               |              |
| Unspecified fall, initial encounter        Headache      2018  |              |
| [place] Emergency    Emergency          -1.      |              |
| Dorsalgia, unspecified        -1. Painful micturition, unspecified     |              |
|      0. Frequency of micturition        1. Urinary tract infection,    |              |
| site not specified        6. Type 2 diabetes mellitus with             |              |
| hyperglycemia        7. Type 2 diabetes mellitus with diabetic chronic |              |
|  kidney disease        8. End stage renal disease        9. Dependence |              |
|  on renal dialysis        10. Shortness of breath        11. Long term |              |
|  (current) use of anticoagulants            E.D. Visit Count (12 mo.)  |              |
|  Facility Visits Low Acuity   [facility] 17 0   [facility]        |              |
| [facility] 2 0   [facility] 3 0   Total   |              |
| 22 0   Note: Visits indicate total known visits. Medicaid Low Acuity   |              |
| Dx are the number of primary diagnoses on the Medicaid's Low Acuity dx |              |
|  list.         Recent Inpatient Visit Summary  Date Facility City      |              |
| State Type Major Type Diagnoses or Chief Complaint   Dec 27, 2018      |              |
| [facility]. [place] Recovery    Inpatient                   |              |
|     Peripheral arterial disease, osteomyelitis left foot        Other  |              |
| acute osteomyelitis, left ankle and foot        Long term (current)    |              |
| use of antibiotics        End stage renal disease        Anemia in     |              |
| chronic kidney disease      Dec 5, 2018 [facility] |              |
|  General Medicine    Inpatient          Peripheral vascular disease,   |              |
| unspecified        End stage renal disease        Dependence on renal  |              |
| dialysis        Long term (current) use of insulin        Other        |              |
| chronic osteomyelitis, left ankle and foot        Type 2 diabetes      |              |
| mellitus with diabetic chronic kidney disease        Essential         |              |
| (primary) hypertension      2018 [facility].   |              |
| [place] Inpatient    Inpatient          Upper GIB        Other chronic      |              |
| osteomyelitis, unspecified ankle and foot        End stage renal       |              |
| disease        Other specified personal risk factors, not elsewhere    |              |
| classified        Chronic systolic (congestive) heart failure          |              |
| Anemia in chronic kidney disease        Other disorders of             |              |
| plasma-protein metabolism, not elsewhere classified        Moderate    |              |
| protein-calorie malnutrition        Other disorders of phosphorus      |              |
| metabolism      [facility] [place] [place] [facility]     |              |
| [facility]    Inpatient          1. Type 2 diabetes mellitus with other  |              |
| specified complication        2. Urinary tract infection, site not     |              |
| specified        3. Unspecified protein-calorie malnutrition        4. |              |
|  Other chronic osteomyelitis, unspecified site        5. Hypertensive  |              |
| heart and chronic kidney disease with heart failure and with stage 5   |              |
| chronic kidney disease, or end stage renal disease        6. Chronic   |              |
| diastolic (congestive) heart failure        7. Other osteomyelitis,    |              |
| ankle and foot        8. Type 2 diabetes mellitus with foot ulcer      |              |
|      9. Atherosclerotic heart disease of native coronary artery        |              |
| without angina pectoris        10. Chronic obstructive pulmonary       |              |
| disease, unspecified      2018 [facility] [place]    |              |
| Medical Surgical    Inpatient          1. Benign paroxysmal vertigo,   |              |
| unspecified ear        2. End stage renal disease        3.            |              |
| Hypertensive chronic kidney disease with stage 5 chronic kidney        |              |
| disease or end stage renal disease        4. Urinary tract infection,  |              |
| site not specified        5. Hypertensive heart and chronic kidney     |              |
| disease with heart failure and with stage 5 chronic kidney disease, or |              |
|  end stage renal disease        6. Kidney transplant status        7.  |              |
| Unspecified systolic (congestive) heart failure        8. Syncope and  |              |
| collapse        9. Type 2 diabetes mellitus with diabetic chronic      |              |
| kidney disease        10. Atherosclerotic heart disease of native      |              |
| coronary artery without angina pectoris            Prescription Drug   |              |
 
| Report (12 Mo.)  PDMP query found no report.     Care Providers        |              |
| Provider PRC Type Phone Fax Service Dates   HEADINGS, SANTIAGO, F.N.P.     |              |
| Nurse Practitioner: Family     Current      [facility] Case        |              |
| Manager/Care Coordinator [phone]    2019 - Current       |              |
| [facility] [phone]    2019 -            |              |
| Current      [facility]    ([phone])       |              |
| [phone] Current      [facility]     |              |
| Care     Current      [facility] GONZALEZ Primary Care     Current            |              |
| [facility] Provider [phone]     |              |
| Current      [facility] Case or Care Manager     Current      [facility]   |              |
| MIRTA Sr Case or Care Manager [phone]     |              |
| [phone] Current      Jairo Gutierrez MD Other     Current           |              |
| Known Aliases  No known aliases.   Criteria Met         Care           |              |
| Guidelines      10 in 12      3 in 60     The above information is     |              |
| provided for the sole purpose of patient treatment. Use of this        |              |
| information beyond the terms of Data Sharing Memorandum of             |              |
| Understanding and License Agreement is prohibited. In certain cases    |              |
| not all visits may be represented.   Consult the aforementioned        |              |
| facilities for additional information.   2019 [facility]       |              |
| [employer] [facility] - [place], [place] -                              |              |
| [email or web address]                                         |              |
+------------------------------------------------------------------------+--------------+
 
 
 
+-------------------------------------------------------------------------------------------
--------------------------------------------------------------------------------------------
--------------------+
| Procedure Note                                                                            
                                                                                            
                    |
+-------------------------------------------------------------------------------------------
--------------------------------------------------------------------------------------------
--------------------+
|   Interface, Lab - 2019  1:04 PM PST  Formatting of this note may be different      
                                                                                            
                    |
| from the original.[id number] Changes to the [facility]             
                                                                                            
                    |
| NotificationOn 2019 the layout of this notification will be updated. For an   
                                                                                            
                    |
| overview of upcoming changes, please log into                                             
                                                                                            
                    |
| [email or web address]. For questions, please email             
                                                                                            
                    |
| [email or web address] or call [phone].This patient has registered at the   
                                                                                            
                    |
| [facility] Emergency Department For more information visit:           
                                                                                            
                    |
| [email or web address] Security     
                                                                                            
                    |
| EventsNo recent Security Events currently on fileED Care Guidelines from [employer] -       
                                                                                            
 
                    |
| [place]illaLast Updated: 18 10:16 AM  Other Information:Currently being seen by         
                                                                                            
                    |
| [facility] in [place] for mental health concerns.[phone] are guidelines and     
                                                                                            
                    |
| the provider should exercise clinical judgment when providing care.Additional guidelines  
                                                                                            
                    |
|  are also available online from the following facilities: [facility] Recent     
                                                                                            
                    |
| Emergency Department Visit SummaryDate Facility [facility] State Type Major Type Diagnoses or   
                                                                                            
                    |
| Chief Complaint 2019 [facility] [place]. [place] Emergency  Emergency   [place]    
                                                                                            
                    |
| [facility] [place] [place] [facility]  [facility]gency    Chest Pain    Nausea      
                                                                                            
                    |
| Shortness of Breath    Chest pain, unspecified    Elevated blood-pressure reading,        
                                                                                            
                    |
| without diagnosis of hypertension    Presence of aortocoronary bypass graft    Other      
                                                                                            
                    |
| specified postprocedural states  2019 [facility] [place] Emergency    
                                                                                            
                    |
| Emergency    CHEST PAIN    Abnormal levels of other serum enzymes    Personal history of  
                                                                                            
                    |
|  other diseases of the circulatory system    Chest pain, unspecified  2019 [facility]    
                                                                                            
                    |
| [facility] [place]. [place] Emergency  Emergency    FISTULA BLEEDING    Hemorrhage due to   
                                                                                            
                    |
| vascular prosthetic devices, implants and grafts, initial encounter  Dec 22, 2018 [facility]    
                                                                                            
                    |
| [facility] [place]. [place] Emergency  Emergency    CHEST PAIN    Type 2 diabetes mellitus  
                                                                                            
                    |
|  with hyperglycemia    Chest pain, unspecified  2018 [facility] [place].   
                                                                                            
                    |
| [place] Emergency  Emergency  Chief Complaint: SOB  2018 [facility].   
                                                                                            
                    |
| [place] Emergency  Emergency    FALL    Unspecified fall, initial encounter    Dependence on   
                                                                                            
                    |
| renal dialysis    End stage renal disease  2018 [facility]. [place]    
                                                                                            
                    |
| Emergency  Emergency    FALL    Essential (primary) hypertension    Type 2 diabetes       
                                                                                            
 
                    |
| mellitus with hyperglycemia    Type 2 diabetes mellitus with unspecified complications    
                                                                                            
                    |
|   Unspecified fall, initial encounter    Headache  2018 [facility] [place]       
                                                                                            
                    |
| [place]. [place] Emergency  Emergency    -1. Dorsalgia, unspecified    -1. Painful micturition,  
                                                                                            
                    |
|  unspecified    0. Frequency of micturition    1. Urinary tract infection, site not       
                                                                                            
                    |
| specified    6. Type 2 diabetes mellitus with hyperglycemia    7. Type 2 diabetes         
                                                                                            
                    |
| mellitus with diabetic chronic kidney disease    8. End stage renal disease    9.         
                                                                                            
                    |
| Dependence on renal dialysis    10. Shortness of breath    11. Long term (current) use    
                                                                                            
                    |
| of anticoagulants  E.D. Visit Count (12 mo.)Facility Visits [facility]      
                                                                                            
                    |
| Health 17 0 [facility] 2 0 [facility] 3 0 Total 22 0   
                                                                                            
                    |
| Note: Visits indicate total known visits. Medicaid Low Acuity Dx are the number of        
                                                                                            
                    |
| primary diagnoses on the Medicaid's Low Acuity dx list.  Recent Inpatient Visit           
                                                                                            
                    |
| SummaryDate Facility [facility] Type Major Type Diagnoses or Chief Complaint Dec 27,      
                                                                                            
                    |
|  [facility] [place]. [place] Recovery  Inpatient    Peripheral arterial disease,   
                                                                                            
                    |
| osteomyelitis left foot    Other acute osteomyelitis, left ankle and foot    Long term    
                                                                                            
                    |
| (current) use of antibiotics    End stage renal disease    Anemia in chronic kidney       
                                                                                            
                    |
| disease  Dec 5, 2018 [facility] [place]. [place] General Medicine  Inpatient           
                                                                                            
                    |
| Peripheral vascular disease, unspecified    End stage renal disease    Dependence on      
                                                                                            
                    |
| renal dialysis    Long term (current) use of insulin    Other chronic osteomyelitis,      
                                                                                            
                    |
| left ankle and foot    Type 2 diabetes mellitus with diabetic chronic kidney disease      
                                                                                            
                    |
| Essential (primary) hypertension  2018 [facility] [place]. [place] Inpatient   
                                                                                            
 
                    |
|  Inpatient    Upper GIB    Other chronic osteomyelitis, unspecified ankle and foot        
                                                                                            
                    |
| End stage renal disease    Other specified personal risk factors, not elsewhere           
                                                                                            
                    |
| classified    Chronic systolic (congestive) heart failure    Anemia in chronic kidney     
                                                                                            
                    |
| disease    Other disorders of plasma-protein metabolism, not elsewhere classified         
                                                                                            
                    |
| Moderate protein-calorie malnutrition    Other disorders of phosphorus metabolism  Nov    
                                                                                            
                    |
| 2018 [facility] [place]. [place] Medical Surgical  Inpatient    1. Type 2 diabetes  
                                                                                            
                    |
|  mellitus with other specified complication    2. Urinary tract infection, site not       
                                                                                            
                    |
| specified    3. Unspecified protein-calorie malnutrition    4. Other chronic              
                                                                                            
                    |
| osteomyelitis, unspecified site    5. Hypertensive heart and chronic kidney disease with  
                                                                                            
                    |
|  heart failure and with stage 5 chronic kidney disease, or end stage renal disease    6.  
                                                                                            
                    |
|  Chronic diastolic (congestive) heart failure    7. Other osteomyelitis, ankle and foot   
                                                                                            
                    |
|    8. Type 2 diabetes mellitus with foot ulcer    9. Atherosclerotic heart disease of     
                                                                                            
                    |
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 Chronic obstructive pulmonary       
                                                                                            
                    |
| disease, unspecified  [facility] [place] [place] [facility]          
                                                                                            
                    |
| Inpatient    1. Benign paroxysmal vertigo, unspecified ear    2. End stage renal disease  
                                                                                            
                    |
|     3. Hypertensive chronic kidney disease with stage 5 chronic kidney disease or end     
                                                                                            
                    |
| stage renal disease    4. Urinary tract infection, site not specified    5. Hypertensive  
                                                                                            
                    |
|  heart and chronic kidney disease with heart failure and with stage 5 chronic kidney      
                                                                                            
                    |
| disease, or end stage renal disease    6. Kidney transplant status    7. Unspecified      
                                                                                            
                    |
| systolic (congestive) heart failure    8. Syncope and collapse    9. Type 2 diabetes      
                                                                                            
 
                    |
| mellitus with diabetic chronic kidney disease    10. Atherosclerotic heart disease of     
                                                                                            
                    |
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     
                                                                                            
                    |
| query found no report.Care [facility]Provider [facility] Type Phone Fax Service Dates HEADINGS,   
                                                                                            
                    |
| ERNST HENDERSONP. Nurse Practitioner: Family   Current  [facility] /Care       
                                                                                            
                    |
| Coordinator [phone]  2019 - Current  [facility] ([phone])      
                                                                                            
                    |
| [phone]  2019 - Current  [facility]  ([phone])        
                                                                                            
                    |
| [phone] Current  [facility]   Current  MAN[facility]O      
                                                                                            
                    |
| [facility] Primary Care   Current  [facility] Provider [phone]  
                                                                                            
                    |
|  [phone] Current  [facility] Case or Care Manager   Current  Willard Crook -             
                                                                                            
                    |
| MIRTA Ortiz Case or Care Manager [phone] Current  Brenda,        
                                                                                            
                    |
| Jairo HARRIS MD Other   Current  Known AliasesNo known aliases. Criteria Met  Care          
                                                                                            
                    |
| Guidelines  10 in 12  3 in 60The above information is provided for the sole purpose of    
                                                                                            
                    |
| patient treatment. Use of this information beyond the terms of Data Sharing Memorandum    
                                                                                            
                    |
| of Understanding and License Agreement is prohibited. In certain cases not all visits     
                                                                                            
                    |
| may be represented. Consult the aforementioned facilities for additional information.     
                                                                                            
                    |
|  [employer] [place], [place] -                         
                                                                                            
                    |
| [email or web address]                                                            
                                                                                            
                    |
|  Peripheral arterial disease, osteomyelitis left foot                                     
                                                                                            
                    |
|   Other acute osteomyelitis, left ankle and foot                                          
                                                                                            
                    |
|   Long term (current) use of antibiotics                                                  
                                                                                            
 
                    |
|   End stage renal disease                                                                 
                                                                                            
                    |
|   Anemia in chronic kidney disease                                                        
                                                                                            
                    |
|                                                                                           
                                                                                            
                    |
|[facility] [place] [facility]  Inpatient                     
                                                                                            
                    |
|  Peripheral vascular disease, unspecified                                                 
                                                                                            
                    |
|   End stage renal disease                                                                 
                                                                                            
                    |
|   Dependence on renal dialysis                                                            
                                                                                            
                    |
|   Long term (current) use of insulin                                                      
                                                                                            
                    |
|   Other chronic osteomyelitis, left ankle and foot                                        
                                                                                            
                    |
|   Type 2 diabetes mellitus with diabetic chronic kidney disease                           
                                                                                            
                    |
|   Essential (primary) hypertension                                                        
                                                                                            
                    |
|                                                                                           
                                                                                            
                    |
|2018 [facility]. [place] Inpatient  Inpatient                           
                                                                                            
                    |
|  Upper GIB                                                                                
                                                                                            
                    |
|   Other chronic osteomyelitis, unspecified ankle and foot                                 
                                                                                            
                    |
|   End stage renal disease                                                                 
                                                                                            
                    |
|   Other specified personal risk factors, not elsewhere classified                         
                                                                                            
                    |
|   Chronic systolic (congestive) heart failure                                             
                                                                                            
                    |
|   Anemia in chronic kidney disease                                                        
                                                                                            
                    |
|   Other disorders of plasma-protein metabolism, not elsewhere classified                  
                                                                                            
 
                    |
|   Moderate protein-calorie malnutrition                                                   
                                                                                            
                    |
|   Other disorders of phosphorus metabolism                                                
                                                                                            
                    |
|                                                                                           
                                                                                            
                    |
|2018 [place] [place]. [place] Medical [facility]  Inpatient                     
                                                                                            
                    |
|  1. Type 2 diabetes mellitus with other specified complication                            
                                                                                            
                    |
|   2. Urinary tract infection, site not specified                                          
                                                                                            
                    |
|   3. Unspecified protein-calorie malnutrition                                             
                                                                                            
                    |
|   4. Other chronic osteomyelitis, unspecified site                                        
                                                                                            
                    |
|   5. 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
onic kidney disease, or end stage renal disease                                             
                    |
|   6. Chronic diastolic (congestive) heart failure                                         
                                                                                            
                    |
|   7. Other osteomyelitis, ankle and foot                                                  
                                                                                            
                    |
|   8. Type 2 diabetes mellitus with foot ulcer                                             
                                                                                            
                    |
|   9. Atherosclerotic heart disease of native coronary artery without angina pectoris      
                                                                                            
                    |
|   10. Chronic obstructive pulmonary disease, unspecified                                  
                                                                                            
                    |
|                                                                                           
                                                                                            
                    |
|2018 [facility] [place] [facility]  Inpatient                      
                                                                                            
                    |
|  1. Benign paroxysmal vertigo, unspecified ear                                            
                                                                                            
                    |
|   2. End stage renal disease                                                              
                                                                                            
                    |
|   3. Hypertensive chronic kidney disease with stage 5 chronic kidney disease or end stage 
renal disease                                                                               
                    |
|   4. Urinary tract infection, site not specified                                          
                                                                                            
 
                    |
|   5. 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
onic kidney disease, or end stage renal disease                                             
                    |
|   6. Kidney transplant status                                                             
                                                                                            
                    |
|   7. Unspecified systolic (congestive) heart failure                                      
                                                                                            
                    |
|   8. Syncope and collapse                                                                 
                                                                                            
                    |
|   9. Type 2 diabetes mellitus with diabetic chronic kidney disease                        
                                                                                            
                    |
|   10. Atherosclerotic heart disease of native coronary artery without angina pectoris     
                                                                                            
                    |
|                                                                                           
                                                                                            
                    |
|                                                                                           
                                                                                            
                    |
|                                                                                           
                                                                                            
                    |
|Prescription Drug Report (12 Mo.)                                                          
                                                                                            
                    |
|PDMP query found no report.                                                                
                                                                                            
                    |
|                                                                                           
                                                                                            
                    |
|Care Providers                                                                             
                                                                                            
                    |
|Provider PRC Type Phone Fax Service Dates                                                  
                                                                                            
                    |
|HEADINGS, SANTIAGO, F.N.P. Nurse Practitioner: Family   Current                                
                                                                                            
                    |
|[facility] Antonio Martinez /Care Coordinator [phone]  2019 - Current         
                                                                                            
                    |
|[facility] [phone]  2019 - Current                          
                                                                                            
                    |
|[facility]  [phone] Current                         
                                                                                            
                    |
|[facility]   Current                                
                                                                                            
                    |
|[facility] Primary Care   Current                                                        
                                                                                            
 
                    |
|[facility] Provider [phone] Current                 
                                                                                            
                    |
|[facility] Case or Care Manager   Current                                                   
                                                                                            
                    |
|MIRTA Goel Case or Care Manager [phone] Current
                                                                                            
                    |
|Jairo Gutierrez MD Other   Current                                                       
                                                                                            
                    |
|                                                                                           
                                                                                            
                    |
|Known Aliases                                                                              
                                                                                            
                    |
|No known aliases.                                                                          
                                                                                            
                    |
|Criteria Met                                                                               
                                                                                            
                    |
|                                                                                           
                                                                                            
                    |
|  Care Guidelines                                                                          
                                                                                            
                    |
|  10 in 12                                                                                 
                                                                                            
                    |
|  3 in 60                                                                                  
                                                                                            
                    |
|                                                                                           
                                                                                            
                    |
|The above information is provided for the sole purpose of patient treatment. Use of this in
formation beyond the terms of Data Sharing Memorandum of Understanding and License Agreement
 is prohibited. In  |
|certain cases not all visits may be represented. Consult the aforementioned facilities for 
additional information.                                                                     
                    |
|2019 [employer] - [place], [place] - [email or web address]                                                                                       
                    |
+-------------------------------------------------------------------------------------------
--------------------------------------------------------------------------------------------
--------------------+
 
 
 
+-------------------+---------+--------------------+--------------+
| Performing        | Address | City/State/Zipcode | Phone Number |
| Organization      |         |                    |              |
+-------------------+---------+--------------------+--------------+
|   ED INFORMATION  |         |                    |              |
 
| EXCHANGE          |         |                    |              |
+-------------------+---------+--------------------+--------------+
 in this encounter
 
 Visit Diagnoses
 
 
+-----------------------------------------------------------------------------------+
| Diagnosis                                                                         |
+-----------------------------------------------------------------------------------+
|   PVD (peripheral vascular disease) (HCC) - Primary                               |
+-----------------------------------------------------------------------------------+
|   Peripheral vascular disease, unspecified                                        |
+-----------------------------------------------------------------------------------+
|   ESRD (end stage renal disease) on dialysis (HCC)                                |
+-----------------------------------------------------------------------------------+
|   End stage renal disease                                                         |
+-----------------------------------------------------------------------------------+
|   Anemia in ESRD (end-stage renal disease) (HCC)                                  |
+-----------------------------------------------------------------------------------+
|   Anemia in chronic kidney disease                                                |
+-----------------------------------------------------------------------------------+
|   Hypoalbuminemia                                                                 |
+-----------------------------------------------------------------------------------+
|   Other disorders of plasma protein metabolism                                    |
+-----------------------------------------------------------------------------------+
|   Electrolyte imbalance risk                                                      |
+-----------------------------------------------------------------------------------+
|   Other specified conditions influencing health status                            |
+-----------------------------------------------------------------------------------+
|   ESRD (end stage renal disease) (HCC)                                            |
+-----------------------------------------------------------------------------------+
|   End stage renal disease                                                         |
+-----------------------------------------------------------------------------------+
|   CAD (coronary artery disease)                                                   |
+-----------------------------------------------------------------------------------+
|   Coronary atherosclerosis of unspecified type of vessel, native or graft         |
+-----------------------------------------------------------------------------------+
|   Paroxysmal atrial fibrillation (HCC)                                            |
+-----------------------------------------------------------------------------------+
|   Atrial fibrillation                                                             |
+-----------------------------------------------------------------------------------+
|   COPD (chronic obstructive pulmonary disease) (HCC)                              |
+-----------------------------------------------------------------------------------+
|   Chronic airway obstruction, not elsewhere classified                            |
+-----------------------------------------------------------------------------------+
|   Chronic systolic congestive heart failure (HCC)                                 |
+-----------------------------------------------------------------------------------+
|   Chronic systolic heart failure                                                  |
+-----------------------------------------------------------------------------------+
|   Hypertension, essential                                                         |
+-----------------------------------------------------------------------------------+
|   Unspecified essential hypertension                                              |
+-----------------------------------------------------------------------------------+
|   Type 2 diabetes mellitus with chronic kidney disease on chronic dialysis, with  |
| long-term current use of insulin (HCC)                                            |
+-----------------------------------------------------------------------------------+
 
 
 
 
 Admitting Diagnoses
 
 
+----------------------------------------------------+
| Diagnosis                                          |
+----------------------------------------------------+
|   PVD (peripheral vascular disease) (HCC)          |
+----------------------------------------------------+
|   Peripheral vascular disease, unspecified         |
+----------------------------------------------------+
|   ESRD (end stage renal disease) on dialysis (HCC) |
+----------------------------------------------------+
|   End stage renal disease                          |
+----------------------------------------------------+
 
 
 
 Administered Medications
 
 
+------------------+--------+---------+------+------+------+
| Medication Order | MAR    | Action  | Dose | Rate | Site |
|                  | Action | Date    |      |      |      |
+------------------+--------+---------+------+------+------+
 
 
 
+-----------------------------------+---+
|   acetaminophen (TYLENOL)         |   |
| suppository 650 mg  650 mg,       |   |
| Rectal, Every 6 Hours PRN, Mild   |   |
| Pain (1-3), Fever, Starting Wed   |   |
| 19 at 1910                   |   |
+-----------------------------------+---+
|                                   |   |
+-----------------------------------+---+
|   acetaminophen (TYLENOL) tablet  |   |
| 650 mg  650 mg, Oral, Every 6     |   |
| Hours PRN, Mild Pain (1-3),       |   |
| Fever, Starting Wed 19 at    |   |
| 1910                              |   |
+-----------------------------------+---+
|                                   |   |
+-----------------------------------+---+
 
 
 
+-----------------------------------+-------+----------+--------+---+---+
|   apixaban (ELIQUIS) tablet 2.5   | Given |  | 2.5 mg |   |   |
| mg  2.5 mg, Oral, 2 Times Daily,  |       | 9 14:01  |        |   |   |
| First dose on Thu 19 at 1200 |       | PST      |        |   |   |
+-----------------------------------+-------+----------+--------+---+---+
 
 
 
+-------+----------+--------+---+---+
| Given |  | 2.5 mg |   |   |
|       | 9 20:51  |        |   |   |
|       | PST      |        |   |   |
+-------+----------+--------+---+---+
 
| Given | 1/25/201 | 2.5 mg |   |   |
|       | 9 08:18  |        |   |   |
|       | PST      |        |   |   |
+-------+----------+--------+---+---+
 
 
 
+---+---+
|   |   |
+---+---+
 
 
 
+-----------------------------------+-------+----------+-------+---+---+
|   aspirin chewable tablet 81 mg   | Given |  | 81 mg |   |   |
| 81 mg, Oral, Daily With           |       | 9 10:01  |       |   |   |
| Breakfast, First dose on Fri      |       | PST      |       |   |   |
| 19 at 0930                   |       |          |       |   |   |
+-----------------------------------+-------+----------+-------+---+---+
 
 
 
+---+---+
|   |   |
+---+---+
 
 
 
+-----------------------------------+-------+----------+-------+---+---+
|   aspirin EC tablet 81 mg  81 mg, | Given |  | 81 mg |   |   |
|  Oral, Daily With Breakfast,      |       | 9 14:00  |       |   |   |
| First dose on u 19 at 0800 |       | PST      |       |   |   |
+-----------------------------------+-------+----------+-------+---+---+
 
 
 
+-------+----------+-------+---+---+
| Given |  | 81 mg |   |   |
|       | 9 08:18  |       |   |   |
|       | PST      |       |   |   |
+-------+----------+-------+---+---+
 
 
 
+---+---+
|   |   |
+---+---+
 
 
 
+-----------------------------------+-------+----------+-------+---+---+
|   atorvastatin (LIPITOR) tablet   | Given |  | 40 mg |   |   |
| 40 mg  40 mg, Oral, Nightly,      |       | 9 21:59  |       |   |   |
| First dose on Wed 19 at 2200 |       | PST      |       |   |   |
+-----------------------------------+-------+----------+-------+---+---+
 
 
 
+-------+----------+-------+---+---+
| Given |  | 40 mg |   |   |
 
|       | 9 20:51  |       |   |   |
|       | PST      |       |   |   |
+-------+----------+-------+---+---+
 
 
 
+---+---+
|   |   |
+---+---+
 
 
 
+----------------------------------+-------+----------+--------+---+---+
|   calcium acetate (PHOSLO)       | Given |  | 667 mg |   |   |
| capsule 667 mg  667 mg, Oral, 3  |       | 9 17:07  |        |   |   |
| Times Daily With Meals, First    |       | PST      |        |   |   |
| dose on 19 at 1930      |       |          |        |   |   |
+----------------------------------+-------+----------+--------+---+---+
 
 
 
+-------+----------+--------+---+---+
| Given |  | 667 mg |   |   |
|       | 9 08:17  |        |   |   |
|       | PST      |        |   |   |
+-------+----------+--------+---+---+
| Given |  | 667 mg |   |   |
|       | 9 12:33  |        |   |   |
|       | PST      |        |   |   |
+-------+----------+--------+---+---+
 
 
 
+---+---+
|   |   |
+---+---+
 
 
 
+-----------------------------------+-------+----------+---------+---+---+
|   carvedilol (COREG) tablet 12.5  | Given |  | 12.5 mg |   |   |
| mg  12.5 mg, Oral, 2 Times Daily  |       | 9 21:59  |         |   |   |
| With Meals, First dose on Wed     |       | PST      |         |   |   |
| 19 at 1930                   |       |          |         |   |   |
+-----------------------------------+-------+----------+---------+---+---+
 
 
 
+-------+----------+---------+---+---+
| Given |  | 12.5 mg |   |   |
|       | 9 17:07  |         |   |   |
|       | PST      |         |   |   |
+-------+----------+---------+---+---+
| Given |  | 12.5 mg |   |   |
|       | 9 08:17  |         |   |   |
|       | PST      |         |   |   |
+-------+----------+---------+---+---+
 
 
 
 
+-----------------------------------+---+
|                                   |   |
+-----------------------------------+---+
|   cefTAZidime (FORTAZ) 2 g in     |   |
| sodium chloride (IV) 0.9 % 50 mL  |   |
| IVPB  2 g, Intravenous,           |   |
| Administer over 30 Minutes, After |   |
|  Hemodialysis (see admin          |   |
| instructions), Starting Wed       |   |
| 19 at 1327                   |   |
+-----------------------------------+---+
|                                   |   |
+-----------------------------------+---+
 
 
 
+-----------------------------------+-------+----------+-----+-------+---+
|   cefTAZidime (FORTAZ) 2 g in     | Given |  | 2 g | 100   |   |
| sodium chloride (IV) 0.9 % 50 mL  |       | 9 20:55  |     | mL/hr |   |
| IVPB  2 g, Intravenous,           |       | PST      |     |       |   |
| Administer over 30 Minutes, Once, |       |          |     |       |   |
|  Wed 19 at 1930, For 1 dose  |       |          |     |       |   |
+-----------------------------------+-------+----------+-----+-------+---+
 
 
 
+---+---+
|   |   |
+---+---+
 
 
 
+-----------------------------------+-------+----------+--------+---+---+
|   cholecalciferol (VITAMIN D-3)   | Given |  | 5,000  |   |   |
| tablet 5,000 Units  5,000 Units,  |       | 9 21:58  | Units  |   |   |
| Oral, Daily, First dose on Wed    |       | PST      |        |   |   |
| 19 at 1930                   |       |          |        |   |   |
+-----------------------------------+-------+----------+--------+---+---+
 
 
 
+-------+----------+--------+---+---+
| Given |  | 5,000  |   |   |
|       | 9 14:00  | Units  |   |   |
|       | PST      |        |   |   |
+-------+----------+--------+---+---+
| Given |  | 5,000  |   |   |
|       | 9 08:18  | Units  |   |   |
|       | PST      |        |   |   |
+-------+----------+--------+---+---+
 
 
 
+---+---+
|   |   |
+---+---+
 
 
 
+-----------------------------------+-------+----------+-------+---+---+
 
|   clopidogrel (PLAVIX) tablet 75  | Given |  | 75 mg |   |   |
| mg  75 mg, Oral, Daily, First     |       | 9 14:01  |       |   |   |
| dose on Thu 19 at 0900       |       | PST      |       |   |   |
+-----------------------------------+-------+----------+-------+---+---+
 
 
 
+-------+----------+-------+---+---+
| Given |  | 75 mg |   |   |
|       | 9 08:17  |       |   |   |
|       | PST      |       |   |   |
+-------+----------+-------+---+---+
 
 
 
+---+---+
|   |   |
+---+---+
 
 
 
+----------------------------------+-------+----------+---------+---+---+
|   diphenhydrAMINE (BENADRYL)     | Given |  | 12.5 mg |   |   |
| injection 12.5 mg  12.5 mg,      |       | 9 09:56  |         |   |   |
| Intravenous, Every 6 Hours PRN,  |       | PST      |         |   |   |
| Allergic Reaction, Itching,      |       |          |         |   |   |
| Starting Fri 19 at 0842     |       |          |         |   |   |
+----------------------------------+-------+----------+---------+---+---+
 
 
 
+---+---+
|   |   |
+---+---+
 
 
 
+---------------------------------+-------+----------+---------+---+---+
|   epoetin byron (PROCRIT)        | Given |  | 10,000  |   |   |
| injection 10,000 Units  10,000  |       | 9 10:07  | Units   |   |   |
| Units, Intravenous, Once In     |       | PST      |         |   |   |
| Dialysis, Thu 19 at 0900,  |       |          |         |   |   |
| For 1 dose, DIALYSIS            |       |          |         |   |   |
+---------------------------------+-------+----------+---------+---+---+
 
 
 
+---+---+
|   |   |
+---+---+
 
 
 
+----------------------------------+-------+----------+-------+---+---+
|   famotidine (PEPCID) tablet 10  | Given |  | 10 mg |   |   |
| mg  10 mg, Oral, Daily, First    |       | 9 22:00  |       |   |   |
| dose on Wed 19 at 1930      |       | PST      |       |   |   |
+----------------------------------+-------+----------+-------+---+---+
 
 
 
 
+-------+----------+-------+---+---+
| Given |  | 10 mg |   |   |
|       | 9 14:01  |       |   |   |
|       | PST      |       |   |   |
+-------+----------+-------+---+---+
| Given |  | 10 mg |   |   |
|       | 9 08:17  |       |   |   |
|       | PST      |       |   |   |
+-------+----------+-------+---+---+
 
 
 
+-----------------------------------+---+
|                                   |   |
+-----------------------------------+---+
|   fentaNYL (SUBLIMAZE) injection  |   |
| 25 mcg  25 mcg, Intravenous,      |   |
| Every 1 Hour PRN, Moderate Pain   |   |
| (4-6), Starting 19 at    |   |
| 1910                              |   |
+-----------------------------------+---+
|                                   |   |
+-----------------------------------+---+
|   fentaNYL (SUBLIMAZE) injection  |   |
| 50 mcg  50 mcg, Intravenous,      |   |
| Every 1 Hour PRN, Severe Pain     |   |
| (7-10), Starting 19 at   |   |
| 1910                              |   |
+-----------------------------------+---+
|                                   |   |
+-----------------------------------+---+
 
 
 
+-----------------------------------+-------+----------+--------+---+---+
|   fentaNYL (SUBLIMAZE) injection  | Given |  | 50 mcg |   |   |
|  Once PRN, Starting 19   |       | 9 18:00  |        |   |   |
| at 1800, Intra-procedure          |       | PST      |        |   |   |
| (CATH/IR)                         |       |          |        |   |   |
+-----------------------------------+-------+----------+--------+---+---+
 
 
 
+---+---+
|   |   |
+---+---+
 
 
 
+-----------------------------------+-------+----------+--------+---+---+
|   heparin (porcine) 1000 UNIT/ML  | Given |  | 5,000  |   |   |
| injection  Once PRN, Starting Wed |       | 9 18:23  | Units  |   |   |
|  19 at 1823, Intra-procedure |       | PST      |        |   |   |
|  (CATH/IR)                        |       |          |        |   |   |
+-----------------------------------+-------+----------+--------+---+---+
 
 
 
+---+---+
 
|   |   |
+---+---+
 
 
 
+----------------------------------+-------+----------+----------+---+---+
|   HYDROcodone-acetaminophen      | Given |  | 1 tablet |   |   |
| (NORCO)  MG per tablet 1   |       | 9 11:09  |          |   |   |
| tablet  1 tablet, Oral, Every 4  |       | PST      |          |   |   |
| Hours PRN, Severe Pain (7-10),   |       |          |          |   |   |
| Starting Wed 19 at 1910     |       |          |          |   |   |
+----------------------------------+-------+----------+----------+---+---+
 
 
 
+-------+----------+----------+---+---+
| Given |  | 1 tablet |   |   |
|       | 9 20:51  |          |   |   |
|       | PST      |          |   |   |
+-------+----------+----------+---+---+
 
 
 
+---+---+
|   |   |
+---+---+
 
 
 
+----------------------------------+-------+----------+----------+---+---+
|   HYDROcodone-acetaminophen      | Given |  | 1 tablet |   |   |
| (NORCO) 5-325 MG per tablet 1    |       | 9 06:26  |          |   |   |
| tablet  1 tablet, Oral, Every 4  |       | PST      |          |   |   |
| Hours PRN, Moderate Pain (4-6),  |       |          |          |   |   |
| Starting Wed 19 at 1910     |       |          |          |   |   |
+----------------------------------+-------+----------+----------+---+---+
 
 
 
+-------+----------+----------+---+---+
| Given |  | 1 tablet |   |   |
|       | 9 09:56  |          |   |   |
|       | PST      |          |   |   |
+-------+----------+----------+---+---+
 
 
 
+---+---+
|   |   |
+---+---+
 
 
 
+-----------------------------------+-------+----------+--------+---+----------+
|   HYDROmorphone (DILAUDID)        | Given |  | 0.5 mg |   | Left Arm |
| injection 0.5 mg  0.5 mg,         |       | 9 15:55  |        |   |          |
| Intravenous, Once, Wed 19 at |       | PST      |        |   |          |
|  1508, For 1 dose                 |       |          |        |   |          |
+-----------------------------------+-------+----------+--------+---+----------+
 
 
 
 
+-----------------------------------+---+
|                                   |   |
+-----------------------------------+---+
|   HYDROmorphone (DILAUDID)        |   |
| injection 0.5 mg  0.5 mg,         |   |
| Intravenous, Every 3 Hours PRN,   |   |
| Moderate Pain (4-6), Starting Wed |   |
|  19 at 1910                  |   |
+-----------------------------------+---+
|                                   |   |
+-----------------------------------+---+
|   HYDROmorphone (DILAUDID)        |   |
| injection 1 mg  1 mg,             |   |
| Intravenous, Every 3 Hours PRN,   |   |
| Severe Pain (7-10), Starting Wed  |   |
| 19 at 1910                   |   |
+-----------------------------------+---+
|                                   |   |
+-----------------------------------+---+
 
 
 
+-----------------------------------+-------+----------+----------+---+---+
|   insulin glargine (LANTUS)       | Given |  | 15 Units |   |   |
| injection 15 Units  15 Units,     |       | 9 22:07  |          |   |   |
| Subcutaneous, Nightly, First dose |       | PST      |          |   |   |
|  on Wed 19 at 2200           |       |          |          |   |   |
+-----------------------------------+-------+----------+----------+---+---+
 
 
 
+-------+----------+----------+---+---+
| Given |  | 15 Units |   |   |
|       | 9 22:39  |          |   |   |
|       | PST      |          |   |   |
+-------+----------+----------+---+---+
 
 
 
+---+---+
|   |   |
+---+---+
 
 
 
+-----------------------------------+-------+----------+---------+---+---+
|   insulin lispro (human)          | Given |  | 1 Units |   |   |
| (HUMALOG) injection 0-3 Units     |       | 9 22:39  |         |   |   |
| 0-3 Units, Subcutaneous, Nightly, |       | PST      |         |   |   |
|  First dose on 19 at     |       |          |         |   |   |
| 2200                              |       |          |         |   |   |
+-----------------------------------+-------+----------+---------+---+---+
 
 
 
+---+---+
|   |   |
+---+---+
 
 
 
 
+-----------------------------------+-------+----------+---------+---+---+
|   insulin lispro (human)          | Given |  | 1 Units |   |   |
| (HUMALOG) injection 0-6 Units     |       | 9 17:12  |         |   |   |
| 0-6 Units, Subcutaneous, 3 Times  |       | PST      |         |   |   |
| Daily Before Meals, First dose on |       |          |         |   |   |
|  19 at 1930              |       |          |         |   |   |
+-----------------------------------+-------+----------+---------+---+---+
 
 
 
+-------+----------+---------+---+---+
| Given |  | 2 Units |   |   |
|       | 9 12:34  |         |   |   |
|       | PST      |         |   |   |
+-------+----------+---------+---+---+
 
 
 
+---+---+
|   |   |
+---+---+
 
 
 
+---------------------------------+-------+----------+--------+---+---+
|   iohexol (OMNIPAQUE 240) 240   | Given |  | 53 mLs |   |   |
| mg/mL injection 53 mL  53 mL,   |       | 9 18:34  |        |   |   |
| Intra-arterial, Img Once PRN,   |       | PST      |        |   |   |
| Other, Starting Wed 19 at  |       |          |        |   |   |
| 1853, For 1 dose                |       |          |        |   |   |
+---------------------------------+-------+----------+--------+---+---+
 
 
 
+---+---+
|   |   |
+---+---+
 
 
 
+-----------------------------------+-------+----------+-------+---+---+
|   isosorbide mononitrate (IMDUR)  | Given |  | 60 mg |   |   |
| 24 hr tablet 60 mg  60 mg, Oral,  |       | 9 14:02  |       |   |   |
| Daily, First dose on Thu 19  |       | PST      |       |   |   |
| at 0900                           |       |          |       |   |   |
+-----------------------------------+-------+----------+-------+---+---+
 
 
 
+-------+----------+-------+---+---+
| Given |  | 60 mg |   |   |
|       | 9 08:18  |       |   |   |
|       | PST      |       |   |   |
+-------+----------+-------+---+---+
 
 
 
 
+---+---+
|   |   |
+---+---+
 
 
 
+----------------------------------+-------+----------+--------+---+---+
|   lidocaine 1 % injection  Once  | Given |  | 10 mLs |   |   |
| PRN, During PCI per physician,   |       | 9 18:01  |        |   |   |
| Starting Wed 19 at 1801,    |       | PST      |        |   |   |
| Intra-procedure (CATH/IR)        |       |          |        |   |   |
+----------------------------------+-------+----------+--------+---+---+
 
 
 
+---+---+
|   |   |
+---+---+
 
 
 
+-----------------------------------+-------+----------+------+---+---+
|   LORazepam (ATIVAN) tablet 1 mg  | Given |  | 1 mg |   |   |
|  1 mg, Oral, Every 8 Hours PRN,   |       | 9 23:26  |      |   |   |
| Anxiety, Starting 19 at  |       | PST      |      |   |   |
| 1910                              |       |          |      |   |   |
+-----------------------------------+-------+----------+------+---+---+
 
 
 
+-------+----------+------+---+---+
| Given |  | 1 mg |   |   |
|       | 9 08:39  |      |   |   |
|       | PST      |      |   |   |
+-------+----------+------+---+---+
 
 
 
+---+---+
|   |   |
+---+---+
 
 
 
+----------------------------------+-------+----------+--------+---+---+
|   metroNIDAZOLE (FLAGYL) tablet  | Given |  | 500 mg |   |   |
| 500 mg  500 mg, Oral, Every 8    |       | 9 22:39  |        |   |   |
| Hours Scheduled (3 times per     |       | PST      |        |   |   |
| day), First dose on 19  |       |          |        |   |   |
| at 1400, Indications: Purulent   |       |          |        |   |   |
| Skin and Soft Tissue Infection   |       |          |        |   |   |
+----------------------------------+-------+----------+--------+---+---+
 
 
 
+-------+----------+--------+---+---+
| Given |  | 500 mg |   |   |
|       | 9 05:57  |        |   |   |
|       | PST      |        |   |   |
+-------+----------+--------+---+---+
 
| Given |  | 500 mg |   |   |
|       | 9 14:01  |        |   |   |
|       | PST      |        |   |   |
+-------+----------+--------+---+---+
 
 
 
+---+---+
|   |   |
+---+---+
 
 
 
+----------------------------------+-------+----------+------+---+---+
|   midazolam (VERSED) injection   | Given |  | 1 mg |   |   |
| Intravenous, Once PRN, Starting  |       | 9 18:00  |      |   |   |
| Wed 19 at 1800,             |       | PST      |      |   |   |
| Intra-procedure (CATH/IR)        |       |          |      |   |   |
+----------------------------------+-------+----------+------+---+---+
 
 
 
+-------+----------+------+---+---+
| Given | 1/23/201 | 1 mg |   |   |
|       | 9 18:23  |      |   |   |
|       | PST      |      |   |   |
+-------+----------+------+---+---+
 
 
 
+---+---+
|   |   |
+---+---+
 
 
 
+-----------------------------------+-------+----------+-------+---+---+
|   nortriptyline (PAMELOR) capsule | Given |  | 25 mg |   |   |
|  25 mg  25 mg, Oral, Every        |       | 9 08:18  |       |   |   |
| Morning, First dose on u        |       | PST      |       |   |   |
| 19 at 0900                   |       |          |       |   |   |
+-----------------------------------+-------+----------+-------+---+---+
 
 
 
+---+---+
|   |   |
+---+---+
 
 
 
+-----------------------------------+-------+----------+-------+---+---+
|   nortriptyline (PAMELOR) capsule | Given |  | 75 mg |   |   |
|  75 mg  75 mg, Oral, Nightly,     |       | 9 22:07  |       |   |   |
| First dose on 19 at 2200 |       | PST      |       |   |   |
+-----------------------------------+-------+----------+-------+---+---+
 
 
 
+-------+----------+-------+---+---+
 
| Given |  | 75 mg |   |   |
|       | 9 20:52  |       |   |   |
|       | PST      |       |   |   |
+-------+----------+-------+---+---+
 
 
 
+---+---+
|   |   |
+---+---+
 
 
 
+-----------------------------------+-------+----------+------+---+---+
|   ondansetron (ZOFRAN) injection  | Given |  | 4 mg |   |   |
| 4 mg  4 mg, Intravenous, Every 6  |       | 9 12:38  |      |   |   |
| Hours PRN, Nausea, Vomiting,      |       | PST      |      |   |   |
| Starting Wed 19 at 1910      |       |          |      |   |   |
+-----------------------------------+-------+----------+------+---+---+
 
 
 
+-------+----------+------+---+---+
| Given |  | 4 mg |   |   |
|       | 9 22:39  |      |   |   |
|       | PST      |      |   |   |
+-------+----------+------+---+---+
 
 
 
+-----------------------------------+---+
|                                   |   |
+-----------------------------------+---+
|   ondansetron (ZOFRAN-ODT)        |   |
| disintegrating tablet 4 mg  4 mg, |   |
|  Oral, Every 6 Hours PRN, Nausea, |   |
|  Vomiting, Starting 19   |   |
| at 1910                           |   |
+-----------------------------------+---+
|                                   |   |
+-----------------------------------+---+
 
 
 
+-----------------------------------+-------+----------+--------+---+---+
|   oxybutynin (DITROPAN) tablet    | Given |  | 2.5 mg |   |   |
| 2.5 mg  2.5 mg, Oral, 2 Times     |       | 9 21:59  |        |   |   |
| Daily, First dose on 19  |       | PST      |        |   |   |
| at 2100                           |       |          |        |   |   |
+-----------------------------------+-------+----------+--------+---+---+
 
 
 
+-------+----------+--------+---+---+
| Given |  | 2.5 mg |   |   |
|       | 9 20:51  |        |   |   |
|       | PST      |        |   |   |
+-------+----------+--------+---+---+
| Given |  | 2.5 mg |   |   |
|       | 9 08:18  |        |   |   |
 
|       | PST      |        |   |   |
+-------+----------+--------+---+---+
 
 
 
+---+---+
|   |   |
+---+---+
 
 
 
+-----------------------------------+-------+----------+-------+---+---+
|   pantoprazole (PROTONIX) EC      | Given |  | 40 mg |   |   |
| tablet 40 mg  40 mg, Oral, Every  |       | 9 06:26  |       |   |   |
| Morning Before Breakfast, First   |       | PST      |       |   |   |
| dose on Thu 19 at 0630       |       |          |       |   |   |
+-----------------------------------+-------+----------+-------+---+---+
 
 
 
+-------+----------+-------+---+---+
| Given |  | 40 mg |   |   |
|       | 9 05:57  |       |   |   |
|       | PST      |       |   |   |
+-------+----------+-------+---+---+
 
 
 
+---+---+
|   |   |
+---+---+
 
 
 
+-----------------------------------+-------+----------+---------+---+---+
|   rOPINIRole (REQUIP) tablet 0.75 | Given |  | 0.75 mg |   |   |
|  mg  0.75 mg, Oral, Nightly,      |       | 9 21:58  |         |   |   |
| First dose on 19 at 2200 |       | PST      |         |   |   |
+-----------------------------------+-------+----------+---------+---+---+
 
 
 
+-------+----------+---------+---+---+
| Given |  | 0.75 mg |   |   |
|       | 9 20:51  |         |   |   |
|       | PST      |         |   |   |
+-------+----------+---------+---+---+
 
 
 
+---+---+
|   |   |
+---+---+
 
 
 
+-----------------------------------+-------+----------+---+---+---+
|   silver sulfADIAZINE (SILVADENE) | Given |  |   |   |   |
|  1 % cream  Topical, 2 Times      |       | 9 13:00  |   |   |   |
| Daily, First dose on 19  |       | PST      |   |   |   |
 
| at 0930, On the right foot and    |       |          |   |   |   |
| cover with  telfa roll gauze and  |       |          |   |   |   |
| secure with  Tape.                |       |          |   |   |   |
+-----------------------------------+-------+----------+---+---+---+
 
 
 
+---+---+
|   |   |
+---+---+
 
 
 
+-----------------------------------+-------+----------+--------+---+---+
|   sodium chloride (PF) 0.9 %      | Given |  | 10 mLs |   |   |
| flush 10 mL  10 mL, Intravenous,  |       | 9 11:30  |        |   |   |
| Every 8 Hours, First dose on Wed  |       | PST      |        |   |   |
| 19 at 1930                   |       |          |        |   |   |
+-----------------------------------+-------+----------+--------+---+---+
 
 
 
+---+---+
|   |   |
+---+---+
 
 
 
+----------------------------------+---------+----------+-------+---+---+
|   vancomycin (VANCOCIN) 1500     | New Bag |  | 1.5 g |   |   |
| mg/250 mL IVPB  1.5 g,           |         | 9 18:12  |       |   |   |
| Intravenous, Administer over 90  |         | PST      |       |   |   |
| Minutes, Once, Wed 19 at    |         |          |       |   |   |
| 1432, For 1 dose                 |         |          |       |   |   |
+----------------------------------+---------+----------+-------+---+---+
 
 
 
+-----------------------------------+---+
|                                   |   |
+-----------------------------------+---+
|   vancomycin (VANCOCIN) 500 mg in |   |
|  sodium chloride (IV) 0.9 % 100   |   |
| mL IVPB  500 mg, Intravenous,     |   |
| Administer over 60 Minutes, See   |   |
| Admin Instructions, Starting Wed  |   |
| 19 at 1910, Administer dose  |   |
| during last hour or immediately   |   |
| following each Hemodialysis       |   |
| session. Use Rx button to message |   |
|  pharmacy for dose.               |   |
+-----------------------------------+---+
|                                   |   |
+-----------------------------------+---+
 
 
 
+-----------------------------------+-------+----------+--------+-------+---+
|   vancomycin (VANCOCIN) 500 mg in | Given |  | 500 mg | 100   |   |
|  sodium chloride (IV) 0.9 % 100   |       | 9 12:34  |        | mL/hr |   |
 
| mL IVPB  500 mg, Intravenous,     |       | PST      |        |       |   |
| Administer over 60 Minutes, Once, |       |          |        |       |   |
|  Fri 19 at 1230, For 1 dose  |       |          |        |       |   |
+-----------------------------------+-------+----------+--------+-------+---+
 
 
 
+---+---+
|   |   |
+---+---+
 in this encounter

## 2019-04-03 NOTE — XMS
Encounter Summary
  Created on: 2019
 
 Cherie Villalobos
 External Reference #: [id number]
 : 48
 Sex: Female
 
 Demographics
 
 
+-----------------------+--------------------------+
| Address               | [address]               |
|                       | [place]  [address] |
+-----------------------+--------------------------+
| Home Phone            | [phone]          |
+-----------------------+--------------------------+
| Preferred Language    | Unknown                  |
+-----------------------+--------------------------+
| Marital Status        |                   |
+-----------------------+--------------------------+
| Tenriism Affiliation | [id number]                     |
+-----------------------+--------------------------+
| Race                  | Unknown                  |
+-----------------------+--------------------------+
| Ethnic Group          | Unknown                  |
+-----------------------+--------------------------+
 
 
 Author
 
 
+--------------+-----------------------+
| Author       | Sherry Tapastreet |
+--------------+-----------------------+
| Organization | [facility] |
+--------------+-----------------------+
| Address      | Unknown               |
+--------------+-----------------------+
| Phone        | Unavailable           |
+--------------+-----------------------+
 
 
 
 Support
 
 
+---------------+--------------+---------------------+-----------------+
| Name          | Relationship | Address             | Phone           |
+---------------+--------------+---------------------+-----------------+
| Anoop Villalobos | ECON         | [place], | [phone] |
|               |              |  [place]  [address]     |                 |
+---------------+--------------+---------------------+-----------------+
| Jennifer Dickson | ECON         | [place]       | [phone] |
|               |              | [phone]               |                 |
+---------------+--------------+---------------------+-----------------+
 
 
 
 
 Care Team Providers
 
 
+-----------------------+------+-----------------+
| Care Team Member Name | Role | Phone           |
+-----------------------+------+-----------------+
| Ivy Couch DO      | PCP  | [phone] |
+-----------------------+------+-----------------+
 
 
 
 Reason for Visit
 
 
+--------+------------------------------------------------------+
| Reason | Comments                                             |
+--------+------------------------------------------------------+
| [facility]  | [facility] D[facility] Rounding Note |
+--------+------------------------------------------------------+
 
 
 
 Encounter Details
 
 
+--------+-------------+----------------------+----------------------+----------------------
+
| Date   | Type        | Department           | Care Team            | Description          
|
+--------+-------------+----------------------+----------------------+----------------------
+
| / | Documentati |   [facility]      |   João Asher MD  | Other (February      
|
|    | on Only     | [address]        |  [address]   | [facility] Provider 
|
|        |             | [address]   | [address]  [place], [place]    |  Dialysis Rounding   
|
|        |             | [place] [address]   | [address] [phone]  | Note)                
|
|        |             | [phone]         |  [phone] (Fax)  |                      
|
+--------+-------------+----------------------+----------------------+----------------------
+
 
 
 
 Social History
 
 
+---------------+------------+-----------+--------+------------------+
| Tobacco Use   | Types      | Packs/Day | Years  | Date             |
|               |            |           | Used   |                  |
+---------------+------------+-----------+--------+------------------+
| Former Smoker | Cigarettes | 1         | 30     | Quit: 2004 |
+---------------+------------+-----------+--------+------------------+
 
 
 
+---------------------+---+---+---+
 
| Smokeless Tobacco:  |   |   |   |
| Never Used          |   |   |   |
+---------------------+---+---+---+
 
 
 
+------------------------------+
| Comments: quite smoking  |
+------------------------------+
 
 
 
+-------------+-----------+---------+----------+
| Alcohol Use | Drinks/We | oz/Week | Comments |
|             | ek        |         |          |
+-------------+-----------+---------+----------+
| No          |           |         |          |
+-------------+-----------+---------+----------+
 
 
 
+------------------+---------------+
| Sex Assigned at  | Date Recorded |
| Birth            |               |
+------------------+---------------+
| Not on file      |               |
+------------------+---------------+
 as of this encounter
 
 Plan of Treatment
 
 
+--------+---------+-----------+----------------------+-------------+
| Date   | Type    | Specialty | Care Team            | Description |
+--------+---------+-----------+----------------------+-------------+
| 04/10/ | Office  | Podiatry  |   Srinivasan Jordan,  |             |
|    | Visit   |           | DPM  [address], |             |
|        |         |           |  [address]  [place],  |             |
|        |         |           | [place] [address]             |             |
|        |         |           | [phone]         |             |
|        |         |           | [phone] (Fax)   |             |
+--------+---------+-----------+----------------------+-------------+
 as of this encounter
 
 Visit Diagnoses
 Not on filein this encounter

## 2019-04-03 NOTE — XMS
Encounter Summary
  Created on: 2019
 
 Cherie Villalobos
 External Reference #: [id number]
 : 48
 Sex: Female
 
 Demographics
 
 
+-----------------------+--------------------------+
| Address               | [address]               |
|                       | [place]  [address] |
+-----------------------+--------------------------+
| Home Phone            | [phone]          |
+-----------------------+--------------------------+
| Preferred Language    | Unknown                  |
+-----------------------+--------------------------+
| Marital Status        |                   |
+-----------------------+--------------------------+
| Alevism Affiliation | [id number]                     |
+-----------------------+--------------------------+
| Race                  | Unknown                  |
+-----------------------+--------------------------+
| Ethnic Group          | Unknown                  |
+-----------------------+--------------------------+
 
 
 Author
 
 
+--------------+-----------------------+
| Author       | Sherry BeHome247 |
+--------------+-----------------------+
| Organization | [facility] |
+--------------+-----------------------+
| Address      | Unknown               |
+--------------+-----------------------+
| Phone        | Unavailable           |
+--------------+-----------------------+
 
 
 
 Support
 
 
+---------------+--------------+---------------------+-----------------+
| Name          | Relationship | Address             | Phone           |
+---------------+--------------+---------------------+-----------------+
| Anoop Villalobos | ECON         | [place], | [phone] |
|               |              |  [place]  [address]     |                 |
+---------------+--------------+---------------------+-----------------+
| Jennifer Dickson | ECON         | [place]       | [phone] |
|               |              | [address]               |                 |
+---------------+--------------+---------------------+-----------------+
 
 
 
 
 Care Team Providers
 
 
+-----------------------+------+-----------------+
| Care Team Member Name | Role | Phone           |
+-----------------------+------+-----------------+
| Ivy Couch DO      | PCP  | [phone] |
+-----------------------+------+-----------------+
 
 
 
 Reason for Visit
 
 
+-----------+----------------------------------+
| Reason    | Comments                         |
+-----------+----------------------------------+
| Follow-up | PAD (peripheral artery disease)  |
+-----------+----------------------------------+
 
 
 
 Encounter Details
 
 
+--------+---------+----------------------+----------------------+----------------------+
| Date   | Type    | Department           | Care Team            | Description          |
+--------+---------+----------------------+----------------------+----------------------+
| / | Office  |   [facility]      |   Kirt Mclaughlin MD     | Steal syndrome as    |
| 2019   | Visit   | Vascular Surgery     | [address]  | complication of      |
|        |         | [address] |  [place] [address]  | dialysis access,     |
|        |         |  E  [place], [place]     |  [phone]        | sequela (Primary     |
|        |         | [address]           | [phone] (Fax)   | Dx); ESRD (end stage |
|        |         | [phone]         |                      |  renal disease)      |
|        |         |                      |                      | (HCC); PAD           |
|        |         |                      |                      | (peripheral artery   |
|        |         |                      |                      | disease) ([facility])       |
+--------+---------+----------------------+----------------------+----------------------+
 
 
 
 Social History
 
 
+---------------+------------+-----------+--------+------------------+
| Tobacco Use   | Types      | Packs/Day | Years  | Date             |
|               |            |           | Used   |                  |
+---------------+------------+-----------+--------+------------------+
| Former Smoker | Cigarettes | 1         | 30     | Quit: 2004 |
+---------------+------------+-----------+--------+------------------+
 
 
 
+---------------------+---+---+---+
| Smokeless Tobacco:  |   |   |   |
| Never Used          |   |   |   |
+---------------------+---+---+---+
 
 
 
 
+------------------------------+
| Comments: quite smoking  |
+------------------------------+
 
 
 
+-------------+-----------+---------+----------+
| Alcohol Use | Drinks/We | oz/Week | Comments |
|             | ek        |         |          |
+-------------+-----------+---------+----------+
| No          |           |         |          |
+-------------+-----------+---------+----------+
 
 
 
+------------------+---------------+
| Sex Assigned at  | Date Recorded |
| Birth            |               |
+------------------+---------------+
| Not on file      |               |
+------------------+---------------+
 as of this encounter
 
 Last Filed Vital Signs
 
 
+-------------------+---------+-------------------------+
| Vital Sign        | Reading | Time Taken              |
+-------------------+---------+-------------------------+
| Blood Pressure    | 130/60  | 2019  2:36 PM PST |
+-------------------+---------+-------------------------+
| Pulse             | 77      | 2019  2:36 PM PST |
+-------------------+---------+-------------------------+
| Temperature       | -       | -                       |
+-------------------+---------+-------------------------+
| Respiratory Rate  | -       | -                       |
+-------------------+---------+-------------------------+
| Oxygen Saturation | 100%    | 2019  2:36 PM PST |
+-------------------+---------+-------------------------+
| Inhaled Oxygen    | -       | -                       |
| Concentration     |         |                         |
+-------------------+---------+-------------------------+
| Weight            | -       | -                       |
+-------------------+---------+-------------------------+
| Height            | -       | -                       |
+-------------------+---------+-------------------------+
| Body Mass Index   | -       | -                       |
+-------------------+---------+-------------------------+
 in this encounter
 
 Progress Notes
 Kirt Mclaughlin MD - 2019  3:00 PM PSTFormatting of this note may be different from the o
aleksandar.
 Ms. Villalobos is a pleasant 68 y.o. female with PMH significant for acute MI, anemia, atrial f
ibrillation, COPD, CAD, diabetes, ESRD, hyperlipidemia, and hypertension, who presents today
 for wound follow up. Patient states she was in the hospital recently, she had an XR of her 
right great toe which showed a fracture. The area is healing. She also complains of pain and
 some discoloration of her left 2nd finger. Patient reports she has been having continued pa
in in her right toe, and at the site of her left forefoot amputation. Patient adds that she 
 
has not been able to follow up with her primary care physician as she is only in the office 
on , and the weather has made it difficult to get into town. 
 
 Results: X-ray foot right 3+ views 
 Impression 
 Linear lucency/irregularity at the distal 1st phalanx, may represent 
 minimally displaced fracture. 
 Signed by: Oleksandr Lemus 
 Sign Date/Time: 2019 10:20 AM 
 
 Past Medical History 
 Diagnosis Date 
   Acute MI ([facility])  
  with stenting x 1 
   Anemia associated with chronic renal failure 2016 
   Atrial fibrillation ([facility])  
   Chronic kidney disease  
   Congestive heart failure ([facility])  
   COPD (chronic obstructive pulmonary disease) ([facility])  
   COPD (chronic obstructive pulmonary disease) ([facility]) 2018 
   Coronary artery disease  
  stent placements: 3 total 
   Depression  
   Diabetes other 
  abstracted 
   Diabetes mellitus, type 2 ([facility])  
   ESRD on peritoneal dialysis ([facility])  
   GERD (gastroesophageal reflux disease)  
   Hemodialysis patient ([facility]) 10/2016 
  Stopped peritoneal and restarted hemodialysis 
   Hemorrhage of gastrointestinal tract, unspecified 2017 
  low GI, rectal bleeding 
   High blood pressure other 
  abstracted 
   High cholesterol other 
  abstracted 
   Hyperlipidemia  
   Hypertension  
   Hyponatremia 2017 
   Myocardial infarction ([facility]) 2017 
   Other chronic pain  
  feet,  
   Pain of left hip joint 2016 
  due to hip fracture and replacement 
   Partial small bowel obstruction ([facility]) 2017 
  resolved 
   Renal failure  
  Stage 5.   Fistula in the JAN - not on dialysis yet. 
   Unspecified visual disturbance  
  glasses 
 
 Past Surgical History 
 Procedure Laterality Date 
   AV FISTULA PLACEMENT   
  left upper arm AV fistula - failed 
   AV FISTULA REPAIR N/A 10/25/2016 
  Procedure: AV FISTULA - GRAFT REPAIR/REVISION;  Surgeon: Kirt Mclaughlin MD;  Location: [facility]
IN OR;  Service: Vascular;  Laterality: N/A;  Superficialization and revision of left arm fi
stula 
   CARDIAC CATHETERIZATION  2017 
 
   CARPAL TUNNEL RELEASE Bilateral other 
  abstracted 
   CATARACT EXTRACTION Bilateral  
   CATHETER REMOVAL N/A 2016 
  Procedure: DIALYSIS CATHETER - REMOVAL;  Surgeon: Kirt Mclaughlin MD;  Location: [facility] OR; 
 Service: Vascular;  Laterality: N/A;  PD cath removal 
   CHOLECYSTECTOMY  other 
  abstracted 
   COLONOSCOPY   
   CORONARY ARTERY BYPASS GRAFT   
   CORONARY STENT PLACEMENT  2017 
  Drug Elutin stents to OM, 1 stent to prox. LAD 
   EYE SURGERY   
   FOOT AMPUTATION Left 2018 
  Procedure: FOREFOOT - AMPUTATION;  Surgeon: Srinivasan Jordan DPM;  Location: [facility] MAIN OR;
  Service: Podiatry;  Laterality: Left; 
   HARDWARE PRESENT   
  stent placements x 3, Left hip replacement, vascular stents 2 in left leg 
   HIP ARTHROPLASTY Left 2016 
  Procedure: HIP - ARTHROPLASTY(ALLISON);  Surgeon: Uriel Roa MD;  Location: [facility] MAIN 
OR;  Service: Orthopedics;  Laterality: Left; 
   HYSTERECTOMY   
  complete 
   PACEMAKER INSERTION   
  [employer] pacemaker/defib 
   PERITONEAL CATHETER INSERTION  8/15/2013 
   PERITONEAL CATHETER INSERTION N/A 2016 
  Procedure: LAPAROSCOPIC - PERITONEAL DIALYSIS CATH INSERTION;  Surgeon: Kirt Mclaughlin MD;  Lo
cation: [facility] MAIN OR;  Service: Vascular;  Laterality: N/A;  Removal of old catheter 
   SHOULDER SURGERY Right  
  frozen shoulder 
   UNLISTED PROCEDURE ARTHROSCOPY   
  total Left hip 
   vascular stents Left 2017 
  leg (2 stents) 
   VASCULAR SURGERY   
 
 Social History 
 Substance Use Topics 
   Smoking status: Former Smoker 
   Packs/day: 1.00 
   Years: 30.00 
   Types: Cigarettes 
   Quit date: 2004 
   Smokeless tobacco: Never Used 
    Comment: quite smoking  
   Alcohol use No 
 
 Family History 
 Problem Relation Age of Onset 
   High cholesterol Father  
   Hypertension Father  
   Diabetes Paternal Grandmother  
   Maljoann hypertherm Neg Hx  
   Kidney disease Neg Hx  
 
 Current Outpatient Prescriptions on File Prior to Visit 
 Medication Sig Dispense Refill 
   albuterol (PROVENTIL HFA;VENTOLIN HFA) 108 (90 Base) MCG/ACT inhaler Inhale 2 puffs int
o the lungs every 4 (four) hours as needed for Wheezing.   
 
   apixaban (ELIQUIS) 2.5 MG tablet Take 1 tablet by mouth 2 (two) times daily. 60 tablet 
0 
   atorvastatin (LIPITOR) 80 MG tablet Take 80 mg by mouth nightly.   
   carvedilol (COREG) 12.5 MG tablet Take 1 tablet by mouth 2 (two) times daily with meals
. 60 tablet 0 
   esomeprazole (NEXIUM) 40 MG capsule Take 1 capsule by mouth every day   
   HYDROcodone-acetaminophen (NORCO) 5-325 MG per tablet Take 1 tablet by mouth every 4 (f
our) hours as needed. 30 tablet 0 
   insulin aspart (NOVOLOG) 100 UNIT/ML injection Inject  into the skin 3 (three) times da
melina before meals. Sliding scale   
   insulin degludec (TRESIBA) 100 UNIT/ML injection Inject 21 units every night   
   isosorbide mononitrate (IMDUR) 60 MG 24 hr tablet Take 1 tablet by mouth daily. 30 tabl
et 1 
   LORazepam (ATIVAN) 1 MG tablet Take 0.5 tablets by mouth every 8 (eight) hours as neede
d for Anxiety. Patient states she has the 1mg tablets at home and that they are scored and s
he will split them in half   
   losartan (COZAAR) 100 MG tablet Take 100 mg by mouth daily.   
   nitroGLYCERIN (NITROSTAT) 0.4 MG SL tablet Place 1 tablet under the tongue every 5 (fiv
e) minutes as needed for Chest pain. 30 tablet 0 
   nortriptyline (PAMELOR) 25 MG capsule Take 25-75 mg by mouth See Admin Instructions. Ta
kes 25 mg by mouth every morning and 75 mg every night   
   ondansetron (ZOFRAN-ODT) 4 MG disintegrating tablet Take 4 mg by mouth every 8 (eight) 
hours as needed.   
   oxybutynin (DITROPAN) 5 MG tablet Take 7.5 mg by mouth 2 (two) times daily.   
   prochlorperazine 5 MG tablet TAKE ONE TABLET BY MOUTH TWICE DAILY AS NEEDED FOR NAUSEA 
60 tablet 11 
   rOPINIRole (REQUIP) 0.25 MG tablet Take 0.75 mg by mouth nightly.   
   TRINTELLIX 20 MG TABS Take 20 mg by mouth every evening.   
 
 No current facility-administered medications on file prior to visit.  
 
 Allergies 
 Allergen Reactions 
   Digitoxin Other (See Comments) 
   Toxic, patient states her eyes were also affected "she seen yellow everywhere" 
   Morphine Mental Changes, Other (See Comments) and Anxiety 
   "makes me feel jittery"
 Other reaction(s): MAKES HER "CRAZY"
 Hallucinations
 Make her crazy/ "funny feeling in my head"
 Has used hydromorphone in-house 
   Penicillin G Hives 
   Penicillins Rash and Hives 
   Other reaction(s): RASH
 Tolerates cephalosporins 
   Quinapril Hcl Anaphylaxis 
   Quinapril Hcl Angioedema 
   Facial Edema 
   Digoxin And Related Other (See Comments) 
   toxic 
   Metaxalone Other (See Comments) 
   Pantoprazole Sodium Nausea and Vomiting 
   Quinapril Hcl Edema 
   Facial Edema 
   Sudafed [Pseudoephedrine Hcl] Rash 
 
 Comprehensive ROS performed and pertinent items described in the HPI. 
 
 Vitals:reviewed
 CONSTITUTIONAL:  Conversant, well developed, NAD
 
 EYES: Anicteric sclerae, no lid drag, no proptosis
 RESP: Normal effort, regular, even, unlabored rate
 CV: No peripheral edema, rate regular
 SKIN: Pink, warm, dry without rash/lesion
 MS: ROM not limited, no digital cyanosis, normal gait
 NEURO: Cranial nerves II-XII grossly intact, A&O times 3
 PSYCH: appropriate affect, speech and tone, judgement and insight intact
 Vascular:  Left AV fistula with a strong palpable thrill.  Toe wound healing well.  
 
 Discussed with the patient that her toe appears to be healing and there is no need for vasc
ular intervention on her right lower extremity. Educated the patient that her finger pain is
 likely the result of her fistula diverting too much blood from her hand. I advised the sylvester
ent to do her best to prevent any injury to her hand, in order to prevent ulcerations. The p
atient will need a fistulogram in the near future, which she can call the office to schedule
. All questions and concerns addressed. Patient will follow up after a fistulogram. Patient 
understands and is agreeable. 
 
 Attending Note: Documentation assistance provided by Steven Mike (David). Information r
ecorded by the [email or web address] has been reviewed and validated by me. I agree with its contents.
 
 Signed by: David Chavis 19, 3:07 PM
 
 Kirt Mclaughlin MD
 
 in this encounter
 
 Plan of Treatment
 
 
+--------+---------+-----------+----------------------+-------------+
| Date   | Type    | Specialty | Care Team            | Description |
+--------+---------+-----------+----------------------+-------------+
| 04/10/ | Office  | Podiatry  |   Srinivasan Jordan,  |             |
| 2019   | Visit   |           | DPM  [address], |             |
|        |         |           |  [address]  [place],  |             |
|        |         |           | [place] [address]             |             |
|        |         |           | [phone]         |             |
|        |         |           | [phone] (Fax)   |             |
+--------+---------+-----------+----------------------+-------------+
 as of this encounter
 
 Visit Diagnoses
 
 
+------------------------------------------------------------------------+
| Diagnosis                                                              |
+------------------------------------------------------------------------+
|   Steal syndrome as complication of dialysis access, sequela - Primary |
+------------------------------------------------------------------------+
|   ESRD (end stage renal disease) (HCC)                                 |
+------------------------------------------------------------------------+
|   End stage renal disease                                              |
+------------------------------------------------------------------------+
|   PAD (peripheral artery disease) (HCC)                                |
+------------------------------------------------------------------------+
|   Unspecified disorders of arteries and arterioles                     |
+------------------------------------------------------------------------+

## 2019-04-03 NOTE — XMS
Encounter Summary
  Created on: 2019
 
 Cherie Villalobos
 External Reference #: [id number]
 : 48
 Sex: Female
 
 Demographics
 
 
+-----------------------+--------------------------+
| Address               | [address]               |
|                       | [place]  [id number] |
+-----------------------+--------------------------+
| Home Phone            | [phone]          |
+-----------------------+--------------------------+
| Preferred Language    | Unknown                  |
+-----------------------+--------------------------+
| Marital Status        |                   |
+-----------------------+--------------------------+
| Pentecostal Affiliation | 1013                     |
+-----------------------+--------------------------+
| Race                  | Unknown                  |
+-----------------------+--------------------------+
| Ethnic Group          | Unknown                  |
+-----------------------+--------------------------+
 
 
 Author
 
 
+--------------+-----------------------+
| Author       | Sherry PhotoSolar |
+--------------+-----------------------+
| Organization | [facility] [employer] [facility] |
+--------------+-----------------------+
| Address      | Unknown               |
+--------------+-----------------------+
| Phone        | Unavailable           |
+--------------+-----------------------+
 
 
 
 Support
 
 
+---------------+--------------+---------------------+-----------------+
| Name          | Relationship | Address             | Phone           |
+---------------+--------------+---------------------+-----------------+
| Anoop Villalobos | ECON         | [place], | [phone] |
|               |              |  [place]  [address]     |                 |
+---------------+--------------+---------------------+-----------------+
| Jennifer Dickson | ECON         | [place]       | [phone] |
|               |              | [id number]               |                 |
+---------------+--------------+---------------------+-----------------+
 
 
 
 
 Care Team Providers
 
 
+-----------------------+------+-----------------+
| Care Team Member Name | Role | Phone           |
+-----------------------+------+-----------------+
| Ivy Montague DO      | PCP  | [phone] |
+-----------------------+------+-----------------+
 
 
 
 Reason for Referral
 Home Health Care (Routine)
 
+-------------+--------------+--------------+--------------+--------------+--------------+
| Status      | Reason       | Specialty    | Diagnoses /  | Referred By  | Referred To  |
|             |              |              | Procedures   | Contact      | Contact      |
+-------------+--------------+--------------+--------------+--------------+--------------+
| New Request |   Specialty  | Home Health  |   Diagnoses  |              |              |
|             | Services     | Services     |  Fall in     | Sanna,  |              |
|             | Required     |              | home,        | MD Angelique    |              |
|             |              |              | initial      | [address]    |              |
|             |              |              | encounter    | [address]         |              |
|             |              |              |              | [place], [place] |              |
|             |              |              |              |  [address]       |              |
|             |              |              |              | Phone:       |              |
|             |              |              |              | [phone] |              |
|             |              |              |              |   Fax:       |              |
|             |              |              |              | [phone] |              |
+-------------+--------------+--------------+--------------+--------------+--------------+
 
 
 
 
 Reason for Visit
 
 
+----------------+-------------+
| Reason         | Comments    |
+----------------+-------------+
| Referral       | Dr. Elliott    |
+----------------+-------------+
| Skin Complaint | right foot  |
+----------------+-------------+
 Auth/Cert
 
+--------+--------+-----------+--------------+--------------+---------------+
| Status | Reason | Specialty | Diagnoses /  | Referred By  | Referred To   |
|        |        |           | Procedures   | Contact      | Contact       |
+--------+--------+-----------+--------------+--------------+---------------+
|        |        |           |   Diagnoses  |              |   Kr 3rd    |
|        |        |           |  Generalized |              | [address] [place] |
|        |        |           |  weakness    |              |  Pavilion     |
|        |        |           | Closed       |              | [address]     |
|        |        |           | displaced    |              | Blvd          |
|        |        |           | fracture of  |              | [place], [place]  |
|        |        |           | distal       |              | [address]  Phone: |
|        |        |           | phalanx of   |              |  [phone] |
|        |        |           | right great  |              |   Fax:        |
 
|        |        |           | toe, initial |              | [phone]  |
|        |        |           |  encounter   |              |               |
|        |        |           | Fall in      |              |               |
|        |        |           | home,        |              |               |
|        |        |           | initial      |              |               |
|        |        |           | encounter    |              |               |
|        |        |           | Cellulitis   |              |               |
|        |        |           | of right toe |              |               |
|        |        |           |   Fatigue,   |              |               |
|        |        |           | unspecified  |              |               |
|        |        |           | type         |              |               |
|        |        |           |              |              |               |
+--------+--------+-----------+--------------+--------------+---------------+
 
 
 
 
 Encounter Details
 
 
+--------+-----------+----------------------+----------------------+----------------------+
| Date   | Type      | Department           | Care Team            | Description          |
+--------+-----------+----------------------+----------------------+----------------------+
| / | Emergency |   [facility]    |   Cookson, Rachel M, | Fall in home,        |
| 2019 - |           | [facility]     |  DO  [address]  | initial encounter    |
|        |           | [address]        |  [place] [address]  | (Primary Dx);        |
| 02/15/ |           | Pavilion  [address]  |  [phone]        | Cellulitis of right  |
| 2019   |           | Blvd  [place], [place]   | Ginny Raya MD  | toe; Closed          |
|        |           | [address]  [phone]  |  [address]      | displaced fracture   |
|        |           |                      | [place] [address]   | of distal phalanx of |
|        |           |                      | [phone]         |  right great toe,    |
|        |           |                      | [phone] (Fax)   | initial encounter;   |
|        |           |                      | Angelique Isidro,   | Generalized          |
|        |           |                      | MD  [address]   | weakness; Fatigue,   |
|        |           |                      | [place] [address]   | unspecified type     |
|        |           |                      | [phone]         |                      |
|        |           |                      | [phone] (Fax)   |                      |
+--------+-----------+----------------------+----------------------+----------------------+
 
 
 
 Social History
 
 
+---------------+------------+-----------+--------+------------------+
| Tobacco Use   | Types      | Packs/Day | Years  | Date             |
|               |            |           | Used   |                  |
+---------------+------------+-----------+--------+------------------+
| Former Smoker | Cigarettes | 1         | 30     | Quit: 2004 |
+---------------+------------+-----------+--------+------------------+
 
 
 
+---------------------+---+---+---+
| Smokeless Tobacco:  |   |   |   |
| Never Used          |   |   |   |
+---------------------+---+---+---+
 
 
 
 
+------------------------------+
| Comments: quite smoking  |
+------------------------------+
 
 
 
+-------------+-----------+---------+----------+
| Alcohol Use | Drinks/We | oz/Week | Comments |
|             | ek        |         |          |
+-------------+-----------+---------+----------+
| No          |           |         |          |
+-------------+-----------+---------+----------+
 
 
 
+------------------+---------------+
| Sex Assigned at  | Date Recorded |
| Birth            |               |
+------------------+---------------+
| Not on file      |               |
+------------------+---------------+
 as of this encounter
 
 Last Filed Vital Signs
 
 
+-------------------+-------------------+-------------------------+
| Vital Sign        | Reading           | Time Taken              |
+-------------------+-------------------+-------------------------+
| Blood Pressure    | 112/57            | 02/15/2019 11:30 AM PST |
+-------------------+-------------------+-------------------------+
| Pulse             | 66                | 02/15/2019 11:30 AM PST |
+-------------------+-------------------+-------------------------+
| Temperature       | 36.7   C (98   F) | 02/15/2019 11:30 AM PST |
+-------------------+-------------------+-------------------------+
| Respiratory Rate  | 18                | 02/15/2019 11:30 AM PST |
+-------------------+-------------------+-------------------------+
| Oxygen Saturation | 96%               | 02/15/2019 11:30 AM PST |
+-------------------+-------------------+-------------------------+
| Inhaled Oxygen    | -                 | -                       |
| Concentration     |                   |                         |
+-------------------+-------------------+-------------------------+
| Weight            | 65.3 kg (144 lb)  | 2019 10:29 PM PST |
+-------------------+-------------------+-------------------------+
| Height            | 177.8 cm (5' 10") | 2019 10:29 PM PST |
+-------------------+-------------------+-------------------------+
| Body Mass Index   | 20.66             | 2019 10:29 PM PST |
+-------------------+-------------------+-------------------------+
 in this encounter
 
 Discharge Summaries
 Angelique Isidro MD - 02/15/2019  2:09 PM PSTFormatting of this note may be different fro
m the original.
 
 Patient:  Cherie Villalobos         :  1950     MRN:  [id number]
 Date of Admission:  2019  
 Date of Discharge:  2/15/2019   
 Treatment Team: 
 Consulting Physician: Srinivasan Abdi DPM
 Consulting Physician: Andrés Elliott MD
 
 Consulting Physician: João Asher MD
 Admitting Provider: Ginny Raya MD
 Discharging Provider:  ANGELIQUE ISIDRO MD
 Discharge Diagnoses: 
 Principal Problem:
   Falls frequently
 Active Problems:
   Depression
   ESRD (end stage renal disease) ([facility])
   Type 2 diabetes mellitus with chronic kidney disease on chronic dialysis, with long-term 
current use of insulin ([facility])
   Anemia in ESRD (end-stage renal disease) ([facility])
   Hypertension, essential
   Dyslipidemia
   Gastroesophageal reflux disease without esophagitis
   Loss of weight
   Severe protein-calorie malnutrition ([facility])
   Chronic systolic congestive heart failure ([facility])
   COPD (chronic obstructive pulmonary disease) ([facility])
   ICD (implantable cardioverter-defibrillator) in place
   Paroxysmal atrial fibrillation ([facility])
   S/P CABG x 2
   S/P mitral valve repair
   PVD (peripheral vascular disease) ([facility])
   CAD (coronary artery disease)
   Macrocytosis
   Lactic acidosis
   Restless legs syndrome
   Overactive bladder
 Resolved Problems:
   * No resolved hospital problems. *
     
 Procedures Performed:
 
 Chief Complaint:
 Referral (Dr. Elliott) and Skin Complaint (right foot )
 
 Hospital Course: 
 
 Frequent falls: 
 Assessment:  It was unclear initially  The exact culprit and it was felt potentially she wa
s a bit dry (as per nephrology who saw her this admission) as she had just had ultrafiltrati
on with HD, versus other culprit.  However the patient did state that when she has recently 
fallen it was in the setting of not using her walker as she had been instructed to as she wa
s only going a very short distance and felt she did not need it.  She will be very diligent 
about using her walker and taking all needed precautions.  
 
 Coronary artery disease with history of CABG, peripheral vascular disease, hypertension and
 hyperlipidemia with history of paroxysmal atrial fibrillation on anticoagulation:.  Will re
sume PTA meds as below on d/c 
 
  
 Overactive bladder: Continue oxybutynin. 
  
 Requip for restless leg syndrome.
  
 Anxiety depression: recommended to Refrain from using benzodiazepinesas much as possible an
d per d/w nephrology will decrease the ativan dose. 
 
  
 
 Diabetes mellitus with diabetic nephropathy with end-stage renal disease on hemodialysis: 
 Continue with current regimen for now, follow, and adjust as needed based on progress. 
 F/u with outpatient providers 
 
  
 
 With respect to her left foot prior injury and prior antibiotics: 
 Assessment:  She was seen by ID here and recently had completed a course of antbx reportedl
y.  They would like to have her off antbx for now and f/u withthem.  Should f/u with her pod
iatrist dr abdi for her foot as well. 
 
 Discharge Exam and Data:
 Vital Signs:
 /57 (BP Location: Right upper arm)  | Pulse 66  | Temp 98 F (36.7 C) (Oral)  | Re
sp 18  | Ht 1.778 m (5' 10")  | Wt 65.3 kg (144 lb)  | SpO2 96%  | Breastfeeding? No  | BMI 
20.66 kg/m 
 I&O Last 3 Shifts:  No intake/output data recorded.
 
 Physical Examination: 
 
 Constitutional: Alert and oriented to person, place, and time. Appears well-developed and w
ell-nourished. 
 
 HEENT: Neck supple, no JVD, non icteric sclera.
 
 Cardiovascular: Normal rate, regular rhythm, normal heart sounds, and intact distal pulses.
  Exam reveals no appreciated gallop or friction rub.  No murmur heard.
 
 Pulmonary/Chest: Effort normal and breath sounds normal. No stridor. No respiratory distres
s.  no wheezes. no rales. exhibits no tenderness. 
 
 Abdominal: Soft. Bowel sounds are normal. exhibits no distension. There is no tenderness. T
here is no rebound and no guarding. 
 
 Extremeties/Musculoskeletal: Normal general range of motion.exhibits no tenderness.  exhibi
ts no edema. Left foot in boot wrapped in dressing, see wound care and ID george . 
   
 Neurological:  Alert and oriented to person, place, and time. Has normal reflexes.  No morteza
s cranial nerve or focal deficit.  Exhibits normal muscle tone. Coordination normal.
 
 Skin: Skin is warm and dry. No rash noted. No erythema. No pallor. 
 
 Psychiatric: Has a normal mood and affect given situation. Behavior is normal. Judgment nor
mal for patient. 
 
 Recent Labs 
 Recent Labs
 Lab 19 
 WBC 5.14 
 HGB 10.1* 
 HCT 30.9* 
 * 
 
 Recent Labs
 Lab 19 
  
 K 4.7 
 CL 96* 
 
 CO2 >40* 
 BUN 9 
 CREATININE 2.96* 
 No results for input(s): INR in the last 168 hours.
 
 Results  
  Procedure Component Value Units Date/Time 
  Blood Culture Set 2 [[id number]] Collected:  19 
  Specimen:  Blood from Blood, peripheral draw Updated:  19 
  Blood Culture Set 1 [[id number]] Collected:  19 165 
  Specimen:  Blood from Blood Line Draw Updated:  19 
  
 
 Recent Radiology Results
 Xr Toe Right 2 View
 
 Result Date: 2019
 No radiographic evidence of osteomyelitis seen.  If further assessment is warranted, consid
er correlation with MRI. Potential acute fracture through the base of the distal phalanx. Si
gned by: Gavin South Sign Date/Time: 2019 5:15 PM
 
 Outstanding Issues:  
 F/u with podiatry, ID, PCP and resume HD.  
 
 Discharge Information:
 Follow up:
 Ivy Montague DO
 [address], [address]
 [facility] [id number] [phone]
 
 Schedule an appointment as soon as possible for a visit
 
 Srinivasan Abdi DPM
 [address] [facility] [facility] [address]
 [facility] [id number] [phone]
 
 Schedule an appointment as soon as possible for a visit
 please continue prior to admission plan
 
 Andrés Elliott MD
 [address] [facility] [address] [phone]
 
 Call
 please call to see when they would like to see you in follow up 
 
 resume care with all providers you were seeing prior to admission 
 
  
 Medication List 
  
 CHANGE how you take these medications  
 LORazepam 1 MG tablet
 Refills:  0
 Commonly known as:  ATIVAN
 Take 0.5 tablets by mouth every 8 (eight) hours as needed for Anxiety. Patient states she h
as the 1mg tablets at home and that they are scored and she will split them in half
 
 What changed:
  how much to take
  additional instructions
  
  
 CONTINUE taking these medications  
 albuterol 108 (90 Base) MCG/ACT inhaler
 Refills:  0
 Commonly known as:  PROVENTIL HFA;VENTOLIN HFA
  
 apixaban 2.5 MG tablet
 QTY:  60 tablet
 Refills:  0
 Commonly known as:  ELIQUIS
 Take 1 tablet by mouth 2 (two) times daily.
  
 atorvastatin 80 MG tablet
 Refills:  0
 Commonly known as:  LIPITOR
  
 carvedilol 12.5 MG tablet
 QTY:  60 tablet
 Refills:  0
 Doctor's comments:  Hold for SBP less than 100
 Hold for HR less than 60
 Commonly known as:  COREG
 Take 1 tablet by mouth 2 (two) times daily with meals.
  
 esomeprazole 40 MG capsule
 Refills:  0
 Commonly known as:  NEXIUM
  
 HYDROcodone-acetaminophen 5-325 MG per tablet
 QTY:  30 tablet
 Refills:  0
 Commonly known as:  NORCO
 Take 1 tablet by mouth every 4 (four) hours as needed.
  
 insulin aspart 100 UNIT/ML injection
 Refills:  0
 Commonly known as:  NOVOLOG
  
 insulin degludec 100 UNIT/ML injection
 Refills:  0
 Commonly known as:  TRESIBA
  
 isosorbide mononitrate 60 MG 24 hr tablet
 QTY:  30 tablet
 Refills:  1
 Take 1 tablet by mouth daily.
  
 losartan 100 MG tablet
 Refills:  0
 Commonly known as:  COZAAR
  
 nitroGLYCERIN 0.4 MG SL tablet
 QTY:  30 tablet
 Refills:  0
 Doctor's comments:  Hold for SBP less than 100
 Commonly known as:  NITROSTAT
 
 Place 1 tablet under the tongue every 5 (five) minutes as needed for Chest pain.
  
 nortriptyline 25 MG capsule
 Refills:  0
 Commonly known as:  PAMELOR
  
 ondansetron 4 MG disintegrating tablet
 Refills:  0
 Commonly known as:  ZOFRAN-ODT
  
 oxybutynin 5 MG tablet
 Refills:  0
 Commonly known as:  DITROPAN
  
 prochlorperazine 5 MG tablet
 QTY:  60 tablet
 Refills:  11
 Doctor's comments:  Please consider 90 day supplies to promote better adherence
 TAKE ONE TABLET BY MOUTH TWICE DAILY AS NEEDED FOR NAUSEA
  
 rOPINIRole 0.25 MG tablet
 Refills:  0
 Commonly known as:  REQUIP
  
 TRINTELLIX 20 MG Tabs
 Refills:  0
 Generic drug:  Vortioxetine HBr
  
  
 You might also be taking other medications not listed above. If you have questions about an
y of your other medications, talk to the person who prescribed them or your Primary Care Pro
vider. 
  
  
  
  
 Where to Get Your Medications 
  
 Information about where to get these medications is not yet available  
 Ask your nurse or doctor about these medications
  LORazepam 1 MG tablet
  
 
 Disposition:  Home
 Condition:  Stable
 Code Status:  Full Code
 
 Discharge took 35 minutes, to include final examination, discussion of admission, and prepa
ration of prescriptions, instructions for on-going care, follow-up and documentation of disc
harge summary.
 
 ANGELIQUE ISIDRO MD
 2:09 PMin this encounter
 
 Discharge Instructions
 Chandler Jean-Baptiste, [place] - 02/15/2019
 HOME HEALTH
 I certify that Cherie Villalobos is under my care and that I had a face to face encounter on  that meets the physician face to face encounter requirements. I certify that based on m
y findings , the patient is in need of intermittent skilled services and a plan for furnishi
 
ng these services to include, but not limited to the services listed in the questions below:
 
 Primary diagnosis for home health: Frequent Falls.
 Additional diagnosis: left great toe gangrene.
 Services needed: Physical & Occupational therapy.
 Patient is homebound because she cannot leave home without assistance of another individual
 and leaving the home requires a considerable and taxing effort. Patient needs the assistanc
e of another individual to leave home because: she has difficulty with ambulation and transf
ers. 
 Physician assuming care for Home Health services: IVY MONTAGUE
 
 FallPrevention
 Falls often occur due to slipping, tripping or losing your balance. Millions of people fall
 every year and injure themselves.Here are ways to reduce your risk of falling again.
  Think about your fall, was there anything that caused your fall that can be fixed, remov
ed, or replaced?
  Make your home safe by keeping walkways clear of objects you may trip over.
  Use non-slip pads under rugs. Do not use area rugs or small throw rugs.
  Use non-slip mats in bathtubs and showers.
  Install handrails and lights on staircases.
  Do not walk in poorly lit areas.
  Do not stand on chairs or wobbly ladders.
  Use caution when reaching overhead or looking upward.This position can cause a loss of
 balance.
  Be sure your shoes fit properly, have non-slip bottoms and are in good condition.
  Wear shoes both inside and out. Avoid going barefoot or wearing slippers.
  Be cautious when going up and down stairs, curbs, and when walking on uneven sidewalks.
  If your balance is poor, consider using a cane or walker.
  If your fall was related to alcohol use, stop or limit alcohol intake.
  If your fall was related to use of sleeping medicines, talk to your doctor about this.
You may need to reduce your dosage at bedtime if you awaken during the night to go to the [facility].
  To reduce the need for nighttime bathroom trips:
  Avoid drinking fluids for several hours before going to bed
  Empty your bladder before going to bed
  Men can keep a urinal at the bedside
  Stay as active as you can. Balance, flexibility, strength, and endurance all come from e
xercise. They all play a role in preventing falls. Ask your healthcare provider which types 
of activity are right for you.
  Get your vision checked on a regular basis.
  If you have pets, know where they are before you stand up or walk so you don't trip over
 them.
  Use night lights.
 Date Last Reviewed: 2015-2018 [employer]. [address] [place] [address]. All righ
ts reserved. This information is not intended as a substitute for professional medical care.
 Always follow your healthcare professional's instructions.
 
 in this encounter
 
 Medications at Time of Discharge
 
 
+----------------------+----------------------+--------+---------+----------+-----------+
| Medication           | Sig.                 | Disp.  | Refills | Start    | End Date  |
|                      |                      |        |         | Date     |           |
+----------------------+----------------------+--------+---------+----------+-----------+
|   albuterol          | Inhale 2 puffs into  |        |         |          |           |
| (PROVENTIL           | the lungs every 4    |        |         |          |           |
| HFA;VENTOLIN HFA)    | (four) hours as      |        |         |          |           |
 
| 108 (90 Base)        | needed for Wheezing. |        |         |          |           |
| MCG/ACT              |                      |        |         |          |           |
| inhalerIndications:  |                      |        |         |          |           |
| states uses 2x       |                      |        |         |          |           |
| monthly average      |                      |        |         |          |           |
+----------------------+----------------------+--------+---------+----------+-----------+
|   apixaban (ELIQUIS) | Take 1 tablet by     |   60   | 0       | 20 |           |
|  2.5 MG tablet       | mouth 2 (two) times  | tablet |         | 18       |           |
|                      | daily.               |        |         |          |           |
+----------------------+----------------------+--------+---------+----------+-----------+
|   atorvastatin       | Take 80 mg by mouth  |        |         | 20 |           |
| (LIPITOR) 80 MG      | nightly.             |        |         | 19       |           |
| tablet               |                      |        |         |          |           |
+----------------------+----------------------+--------+---------+----------+-----------+
|   carvedilol (COREG) | Take 1 tablet by     |   60   | 0       | 20 |  |
|  12.5 MG tablet      | mouth 2 (two) times  | tablet |         | 19       | 0         |
|                      | daily with meals.    |        |         |          |           |
+----------------------+----------------------+--------+---------+----------+-----------+
|   esomeprazole       | Take 1 capsule by    |        |         |          |           |
| (NEXIUM) 40 MG       | mouth every day      |        |         |          |           |
| capsule              |                      |        |         |          |           |
+----------------------+----------------------+--------+---------+----------+-----------+
|                      | Take 1 tablet by     |   30   | 0       | 20 |           |
| HYDROcodone-acetamin | mouth every 4 (four) | tablet |         | 19       |           |
| ophen (NORCO) 5-325  |  hours as needed.    |        |         |          |           |
| MG per tablet        |                      |        |         |          |           |
+----------------------+----------------------+--------+---------+----------+-----------+
|   insulin aspart     | Inject  into the     |        |         |          |           |
| (NOVOLOG) 100        | skin 3 (three) times |        |         |          |           |
| UNIT/ML injection    |  daily before meals. |        |         |          |           |
|                      |  Sliding scale       |        |         |          |           |
+----------------------+----------------------+--------+---------+----------+-----------+
|   insulin degludec   | Inject 21 units      |        |         |          |           |
| (TRESIBA) 100        | every night          |        |         |          |           |
| UNIT/ML injection    |                      |        |         |          |           |
+----------------------+----------------------+--------+---------+----------+-----------+
|   isosorbide         | Take 1 tablet by     |   30   | 1       | 20 |  |
| mononitrate (IMDUR)  | mouth daily.         | tablet |         | 18       | 9         |
| 60 MG 24 hr tablet   |                      |        |         |          |           |
+----------------------+----------------------+--------+---------+----------+-----------+
|   LORazepam (ATIVAN) | Take 0.5 tablets by  |        |         | 02/15/20 |           |
|  1 MG tablet         | mouth every 8        |        |         | 19       |           |
|                      | (eight) hours as     |        |         |          |           |
|                      | needed for Anxiety.  |        |         |          |           |
|                      | Patient states she   |        |         |          |           |
|                      | has the 1mg tablets  |        |         |          |           |
|                      | at home and that     |        |         |          |           |
|                      | they are scored and  |        |         |          |           |
|                      | she will split them  |        |         |          |           |
|                      | in half              |        |         |          |           |
+----------------------+----------------------+--------+---------+----------+-----------+
|   losartan (COZAAR)  | Take 100 mg by mouth |        |         | 20 |           |
| 100 MG tablet        |  daily.              |        |         | 19       |           |
+----------------------+----------------------+--------+---------+----------+-----------+
|   nitroGLYCERIN      | Place 1 tablet under |   30   | 0       | 20 |  |
| (NITROSTAT) 0.4 MG   |  the tongue every 5  | tablet |         | 19       | 0         |
| SL tablet            | (five) minutes as    |        |         |          |           |
|                      | needed for Chest     |        |         |          |           |
|                      | pain.                |        |         |          |           |
+----------------------+----------------------+--------+---------+----------+-----------+
 
|   nortriptyline      | Take 25-75 mg by     |        |         |          |           |
| (PAMELOR) 25 MG      | mouth See Admin      |        |         |          |           |
| capsule              | Instructions. Takes  |        |         |          |           |
|                      | 25 mg by mouth every |        |         |          |           |
|                      |  morning and 75 mg   |        |         |          |           |
|                      | every night          |        |         |          |           |
+----------------------+----------------------+--------+---------+----------+-----------+
|   ondansetron        | Take 4 mg by mouth   |        |         | 20 |           |
| (ZOFRAN-ODT) 4 MG    | every 8 (eight)      |        |         | 18       |           |
| disintegrating       | hours as needed.     |        |         |          |           |
| tablet               |                      |        |         |          |           |
+----------------------+----------------------+--------+---------+----------+-----------+
|   oxybutynin         | Take 7.5 mg by mouth |        |         |          |           |
| (DITROPAN) 5 MG      |  2 (two) times       |        |         |          |           |
| tablet               | daily.               |        |         |          |           |
+----------------------+----------------------+--------+---------+----------+-----------+
|   prochlorperazine 5 | TAKE ONE TABLET BY   |   60   | 11      | 20 |           |
|  MG tablet           | MOUTH TWICE DAILY AS | tablet |         | 19       |           |
|                      |  NEEDED FOR NAUSEA   |        |         |          |           |
+----------------------+----------------------+--------+---------+----------+-----------+
|   rOPINIRole         | Take 0.75 mg by      |        |         |          |           |
| (REQUIP) 0.25 MG     | mouth nightly.       |        |         |          |           |
| tablet               |                      |        |         |          |           |
+----------------------+----------------------+--------+---------+----------+-----------+
|   TRINTELLIX 20 MG   | Take 20 mg by mouth  |        |         | 20 |           |
| TABS                 | every evening.       |        |         | 19       |           |
+----------------------+----------------------+--------+---------+----------+-----------+
 as of this encounter
 
 Progress Notes
 Hortencia Vela RD - 02/15/2019  3:02 PM PSTFormatting of this note may be different from the
 original.
 
  02/15/19 1430 
 Subjective 
 Timepoint Admit
 (Consult - poor appetite) 
 Pt c/o Pt reports she is discharging today.  PO intake much improved.  Pt reports she plans
 on getting set up with a meal service.  Also noted that if she purchases food, caregiver wi
ll prepare meals for her.  Pt states she will eat anything if it prepared for her.  States s
he is discharging and has no needs today 
 Diet Experience 
 Self-selected diet(s) followed Pt reports she sees her RD at [facility] monthly.  She is not on
 a renal diet at this time due to poor po intake.  Pt has been eating a lot of soups even th
ough high in sodium.  Has protein shakes at home, but hasn't been drinking because she read 
an article about a boy who drank too many and  and now she is afraid to drink.   
 Food Intake 
 Amount of Food Pt reports she is eating >50% of meals 
 Type of Food / Meals Renal cardiac  
 Biochemical data, medical tests, and procedures reviewed 
 Biochemical data, medical tests, and procedures reviewed K WNL 
 Recommendations 
 Recommended energy needs Encourage po intake.  Discussed safe use with oral nutrition suppl
ements.  Will follow with RD at [facility].  Monitor and follow as indicated.   
 Nutritional Risk 
 Nutritional risk Moderate 
 Follow up date 19 
 Michael David MD - 02/15/2019  2:11 PM PSTFormatting of this note may be different f
rom the original.
 Infectious Disease
 
 Progress Note
 
 Hospital Day:   LOS: 0 days 
 SUBJECTIVE
 No acute event overnight.  No new complaint.  No fever.
 
 Antibiotics:IV vancomycin was given yesterday x1
 
 Allergy:
 Allergies 
 Allergen Reactions 
   Quinapril Hcl Anaphylaxis 
   Digoxin And Related Other (See Comments) 
   toxic 
   Metaxalone Other (See Comments) 
   Morphine Mental Changes 
   Make her crazy/ "funny feeling in my head"
 Has used hydromorphone in-house 
   Pantoprazole Sodium Nausea and Vomiting 
   Penicillins Rash 
   Tolerates cephalosporins 
   Quinapril Hcl Edema 
   Facial Edema 
   Sudafed [Pseudoephedrine Hcl] Rash 
 
 OBJECTIVE
 Vital Signs:
 /57 (BP Location: Right upper arm)  | Pulse 66  | Temp 98 F (36.7 C) (Oral)  | Re
sp 18  | Ht 1.778 m (5' 10")  | Wt 65.3 kg (144 lb)  | SpO2 96%  | Breastfeeding? No  | BMI 
20.66 kg/m 
 
 Examination:
 
 Constitutional: Resting in the recliner, not in acute distress.She is alert, awake, oriente
d well.
 HEENT: 
 Head: Normocephalic and Atraumatic. 
 Nose: Nose normal. 
 Neck: Neck supple.No palpable mass
 Cardiovascular: Not appreciate rubs or gallops
 Pulmonary/Chest:  Clear to auscultation bilaterally.
 Abdominal: Soft, bowel sounds are present, no distension, no ascites. 
 Musculoskeletal: Right great toe slightly swollen, there is a small skin tear with no surro
unding erythema or purulent drainage.Left TMA stump heals nicely with no evidence of infecti
on.
 Neurological:  No focal neurologic deficits. 
 Skin: Skin is warm and dry. No rash noted. 
 Psychiatric: Alert, oriented well.  No agitation.
 
 LABS:
 Recent Results (from the past 24 hour(s)) 
 Septic Lactic Acid 
  Collection Time: 19  4:55 PM 
 Result Value Ref Range 
  LACTIC ACID 2.7 (H) 0.4 - 2.0 mmol/L 
 C-Reactive Protein 
  Collection Time: 19  5:39 PM 
 Result Value Ref Range 
  CRP 0.8 (H) <0.5 mg/dL 
 CBC with differential 
 
  Collection Time: 19  5:39 PM 
 Result Value Ref Range 
  WBC 5.14 3.80 - 11.00 K/uL 
  RBC 2.91 (L) 3.70 - 5.10 M/uL 
  HGB 10.1 (L) 11.3 - 15.5 g/dL 
  HCT 30.9 (L) 34.0 - 46.0 % 
  .1 (H) 80.0 - 100.0 fl 
  MCH 34.8 (H) 27.0 - 34.0 pg 
  MCHC 32.8 32.0 - 35.5 g/dL 
  RDW SD 61.3 (H) 37 - 53 fl 
   (L) 150 - 400 K/uL 
  MPV 9.3 fl 
  DIFF TYPE AUTOMATED  
  NEUTROPHILS 74.03 % 
  LYMPHOCYTES 20.20 % 
  MONOCYTES 5.16 % 
  EOSINOPHILS 0.03 % 
  BASOPHILS 0.58 % 
  NEUTROPHILS ABS 3.81 1.90 - 7.40 K/uL 
  LYMPHOCYTES ABS 1.04 1.00 - 3.90 K/uL 
  MONOCYTES ABS 0.27 0.00 - 0.80 K/uL 
  EOSINOPHILS ABS 0.00 0.00 - 0.50 K/uL 
  BASOPHILS ABS 0.03 0.00 - 0.10 K/uL 
  MORPHOLOGY 1+  
  Platelet Estimate DECREASED  
 Comprehensive metabolic panel 
  Collection Time: 19  5:39 PM 
 Result Value Ref Range 
  SODIUM 143 135 - 145 mmol/L 
  POTASSIUM 4.7 3.5 - 4.9 mmol/L 
  CHLORIDE 96 (L) 99 - 109 mmol/L 
  CO2 >40 (HH) 23 - 32 mmol/L 
  ANION GAP AGAP UNABLE TO CALCULATE 5 - 20 mmol/L 
  GLUCOSE 160 (H) 65 - 99 mg/dL 
  BUN 9 8 - 25 mg/dL 
  CREATININE 2.96 (H) 0.50 - 1.00 mg/dL 
  BUN/CREAT 3  
  CALCIUM 9.4 8.5 - 10.5 mg/dL 
  TOTAL PROTEIN 6.7 6.3 - 8.2 g/dL 
  Albumin 4.3 3.3 - 4.8 g/dL 
  GLOBULIN 2.4 1.3 - 4.9 g/dL 
  A/G 1.8 1.0 - 2.4 
  TBIL 0.5 0.1 - 1.5 mg/dL 
  ALK PHOS 103 35 - 115 U/L 
  AST 54 (H) 10 - 45 U/L 
  ALT 40 10 - 65 U/L 
  EGFR 16 (L) >60 mL/min/1.73m2 
 Sedimentation Rate (ESR) 
  Collection Time: 19  5:39 PM 
 Result Value Ref Range 
  ESR 13 0 - 30 mm/Hr 
 Vitamin B12 
  Collection Time: 19  5:39 PM 
 Result Value Ref Range 
  VITAMIN B12 553 254 - 1,320 pg/mL 
 Folate 
  Collection Time: 19  5:39 PM 
 Result Value Ref Range 
  FOLATE 8.0 >5.4 ng/mL 
 TSH 
 
  Collection Time: 19  5:39 PM 
 Result Value Ref Range 
  TSH 0.904 0.450 - 5.100 uIU/mL 
 Septic Lactic Acid 
  Collection Time: 19  7:22 PM 
 Result Value Ref Range 
  LACTIC ACID 2.7 (H) 0.4 - 2.0 mmol/L 
 Septic Lactic Acid 
  Collection Time: 19 10:12 PM 
 Result Value Ref Range 
  LACTIC ACID 2.0 0.4 - 2.0 mmol/L 
 POCT glucose 
  Collection Time: 19 10:32 PM 
 Result Value Ref Range 
  GLUCOSE,POC SCREEN 202 (H) 65 - 99 mg/dL 
 POCT glucose 
  Collection Time: 02/15/19  6:01 AM 
 Result Value Ref Range 
  GLUCOSE,POC SCREEN 108 (H) 65 - 99 mg/dL 
 POCT glucose 
  Collection Time: 02/15/19 11:36 AM 
 Result Value Ref Range 
  GLUCOSE,POC SCREEN 231 (H) 65 - 99 mg/dL 
 
 Results  
  Procedure Component Value Units Date/Time 
  Blood Culture Set 2 [[id number]] Collected:  19 1739 
  Specimen:  Blood from Blood, peripheral draw Updated:  19 
  Blood Culture Set 1 [[id number]] Collected:  19 1655 
  Specimen:  Blood from Blood Line Draw Updated:  19 
  
 
 ASSESSMENT & PLAN
 This is a 68-year-old female with multiple comorbidities including End-stage renal disease,
 on regular hemodialysis via fistula at the left upper extremity.  History of severe periphe
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
A resection .  She completed the course of IV antibiotic and the wound is healing nicely wit
h no evidence of ongoing infection.  
 She was admitted at this time for frequent fall at home and noted to have right great toe f
racture, base of distal phalanx.  No intervention recommended at this point.  No evidence of
 ongoing skin and soft tissue infection at present.  Would agree to discharge home and follo
w-up with podiatrist.  Continue to off antibiotic per infectious disease standpoint
 
 Plan discussed with patient at length.  She can follow-up with infectious disease as needed
 if concern for recurrent infection.  Plan discussed with medicine team. Thank you for consu
herb David MD
 2/15/2019
 
 in this encounter
 
 Plan of Treatment
 
 
+--------+---------+-----------+----------------------+-------------+
| Date   | Type    | Specialty | Care Team            | Description |
+--------+---------+-----------+----------------------+-------------+
| 04/10/ | Office  | Podiatry  |   Srinivasan Abdi,  |             |
|    | Visit   |           | DPM  [address], |             |
 
|        |         |           |  [address]  [place],  |             |
|        |         |           | [place] [address]             |             |
|        |         |           | [phone]         |             |
|        |         |           | [phone] (Fax)   |             |
+--------+---------+-----------+----------------------+-------------+
 
 
 
+-------------------------+--------+----------------------+---------------------+
| Name                    | Priori | Associated Diagnoses | Order Schedule      |
|                         | ty     |                      |                     |
+-------------------------+--------+----------------------+---------------------+
| Referral to Home Health | Routin |   Fall in home,      | Ordered: 02/15/2019 |
|                         | e      | initial encounter    |                     |
+-------------------------+--------+----------------------+---------------------+
 as of this encounter
 
 Procedures
 
 
+----------------------+--------+-------------+----------------------+----------------------
+
| Procedure Name       | Priori | Date/Time   | Associated Diagnosis | Comments             
|
|                      | ty     |             |                      |                      
|
+----------------------+--------+-------------+----------------------+----------------------
+
| POCT GLUCOSE         | Routin | 02/15/2019  |                      |   Results for this   
|
|                      | e      | 11:36 AM    |                      | procedure are in the 
|
|                      |        | PST         |                      |  results section.    
|
+----------------------+--------+-------------+----------------------+----------------------
+
| POCT GLUCOSE         | Routin | 02/15/2019  |                      |   Results for this   
|
|                      | e      |  6:01 AM    |                      | procedure are in the 
|
|                      |        | PST         |                      |  results section.    
|
+----------------------+--------+-------------+----------------------+----------------------
+
| POCT GLUCOSE         | Routin | 2019  |                      |   Results for this   
|
|                      | e      | 10:32 PM    |                      | procedure are in the 
|
|                      |        | PST         |                      |  results section.    
|
+----------------------+--------+-------------+----------------------+----------------------
+
| [facility] SEPT[facility]ACT[facility]   | STAT   | 2019  |                      |   Results for this   
|
| ACID                 |        | 10:12 PM    |                      | procedure are in the 
|
|                      |        | PST         |                      |  results section.    
|
+----------------------+--------+-------------+----------------------+----------------------
+
 
| [facility]   | STAT   | 2019  |                      |   Results for this   
|
| ACID                 |        |  7:22 PM    |                      | procedure are in the 
|
|                      |        | PST         |                      |  results section.    
|
+----------------------+--------+-------------+----------------------+----------------------
+
| BLOOD CULTURE, SET 2 | STAT   | 2019  |                      |   Results for this   
|
|                      |        |  5:39 PM    |                      | procedure are in the 
|
|                      |        | PST         |                      |  results section.    
|
+----------------------+--------+-------------+----------------------+----------------------
+
| SEDIMENTATION RATE,  | STAT   | 2019  |                      |   Results for this   
|
| AUTOMATED            |        |  5:39 PM    |                      | procedure are in the 
|
|                      |        | PST         |                      |  results section.    
|
+----------------------+--------+-------------+----------------------+----------------------
+
| CBC W/AUTO DIFF      | STAT   | 2019  |                      |   Results for this   
|
| (REFLEX TO MANUAL)   |        |  5:39 PM    |                      | procedure are in the 
|
|                      |        | PST         |                      |  results section.    
|
+----------------------+--------+-------------+----------------------+----------------------
+
| C-REACTIVE PROTEIN   | STAT   | 2019  |                      |   Results for this   
|
|                      |        |  5:39 PM    |                      | procedure are in the 
|
|                      |        | PST         |                      |  results section.    
|
+----------------------+--------+-------------+----------------------+----------------------
+
| TSH                  | STAT   | 2019  |                      |   Results for this   
|
|                      |        |  5:39 PM    |                      | procedure are in the 
|
|                      |        | PST         |                      |  results section.    
|
+----------------------+--------+-------------+----------------------+----------------------
+
| FOLATE               | Add-On | 2019  |                      |   Results for this   
|
|                      |        |  5:39 PM    |                      | procedure are in the 
|
|                      |        | PST         |                      |  results section.    
|
+----------------------+--------+-------------+----------------------+----------------------
+
| VITAMIN B12          | Add-On | 2019  |                      |   Results for this   
|
|                      |        |  5:39 PM    |                      | procedure are in the 
|
 
|                      |        | PST         |                      |  results section.    
|
+----------------------+--------+-------------+----------------------+----------------------
+
| COMPREHENSIVE        | STAT   | 2019  |                      |   Results for this   
|
| METABOLIC PANEL      |        |  5:39 PM    |                      | procedure are in the 
|
|                      |        | PST         |                      |  results section.    
|
+----------------------+--------+-------------+----------------------+----------------------
+
| XR TOES RIGHT        | ASAP   | 2019  |                      |   Results for this   
|
|                      |        |  5:08 PM    |                      | procedure are in the 
|
|                      |        | PST         |                      |  results section.    
|
+----------------------+--------+-------------+----------------------+----------------------
+
| [facility]ACT[facility]   | STAT   | 2019  |                      |   Results for this   
|
| ACID                 |        |  4:55 PM    |                      | procedure are in the 
|
|                      |        | PST         |                      |  results section.    
|
+----------------------+--------+-------------+----------------------+----------------------
+
| BLOOD CULTURE, SET 1 | STAT   | 2019  |                      |   Results for this   
|
|                      |        |  4:55 PM    |                      | procedure are in the 
|
|                      |        | PST         |                      |  results section.    
|
+----------------------+--------+-------------+----------------------+----------------------
+
| ED INFORMATION       | Routin | 2019  |                      |   Results for this   
|
| EXCHANGE             | e      |  3:21 PM    |                      | procedure are in the 
|
|                      |        | PST         |                      |  results section.    
|
+----------------------+--------+-------------+----------------------+----------------------
+
 in this encounter
 
 Results
 POCT glucose (02/15/2019 11:36 AM)
 
+--------------------+--------------------------+---------------+-----------------+
| Component          | Value                    | Ref Range     | Performed At    |
+--------------------+--------------------------+---------------+-----------------+
| GLUCOSE,POC SCREEN | 231 (H)Comment: Testing  | 65 - 99 mg/dL | [facility] |
|                    | performed at [facility];[address]     |               |                 |
|                    | [address];[place]   |               |                 |
|                    | [id number]                    |               |                 |
+--------------------+--------------------------+---------------+-----------------+
 
 
 
 
+-------------------+------------------+--------------------+--------------+
| Performing        | Address          | City/State/Zipcode | Phone Number |
| Organization      |                  |                    |              |
+-------------------+------------------+--------------------+--------------+
|   [facility] |   [phone] [address] | [facility] [place] [address] |              |
+-------------------+------------------+--------------------+--------------+
 POCT glucose (02/15/2019  6:01 AM)
 
+--------------------+--------------------------+---------------+-----------------+
| Component          | Value                    | Ref Range     | Performed At    |
+--------------------+--------------------------+---------------+-----------------+
| GLUCOSE,POC SCREEN | 108 (H)Comment: Testing  | 65 - 99 mg/dL | [facility] |
|                    | performed at [facility];[address]     |               |                 |
|                    | [address];[place]   |               |                 |
|                    | [id number]                    |               |                 |
+--------------------+--------------------------+---------------+-----------------+
 
 
 
+-------------------+------------------+--------------------+--------------+
| Performing        | Address          | City/State/Zipcode | Phone Number |
| Organization      |                  |                    |              |
+-------------------+------------------+--------------------+--------------+
|   [facility] |   [address] | [place] [address] |              |
+-------------------+------------------+--------------------+--------------+
 POCT glucose (2019 10:32 PM)
 
+--------------------+--------------------------+---------------+-----------------+
| Component          | Value                    | Ref Range     | Performed At    |
+--------------------+--------------------------+---------------+-----------------+
| GLUCOSE,POC SCREEN | 202 (H)Comment: Testing  | 65 - 99 mg/dL | [facility] |
|                    | performed at [facility];[address]     |               |                 |
|                    | [address];[place]   |               |                 |
|                    | [address]                    |               |                 |
+--------------------+--------------------------+---------------+-----------------+
 
 
 
+-------------------+------------------+--------------------+--------------+
| Performing        | Address          | City/State/Zipcode | Phone Number |
| Organization      |                  |                    |              |
+-------------------+------------------+--------------------+--------------+
|   [facility] |   [address] | [place] [address] |              |
+-------------------+------------------+--------------------+--------------+
 Septic Lactic Acid (2019 10:12 PM)
 
+-------------+-------------------------+------------------+-----------------+
| Component   | Value                   | Ref Range        | Performed At    |
+-------------+-------------------------+------------------+-----------------+
| LACTIC ACID | 2.0Comment: Testing     | 0.4 - 2.0 mmol/L | [facility] |
|             | performed at [facility]    |                  |                 |
|             | [facility];[place], [place]  |                  |                 |
|             | [address]                   |                  |                 |
+-------------+-------------------------+------------------+-----------------+
 
 
 
+-------------------+------------------+--------------------+--------------+
| Performing        | Address          | City/State/Zipcode | Phone Number |
| Organization      |                  |                    |              |
 
+-------------------+------------------+--------------------+--------------+
|   [facility] |   [address] | [place] [address] |              |
+-------------------+------------------+--------------------+--------------+
 Septic Lactic Acid (2019  7:22 PM)
 
+-------------+--------------------------+------------------+-----------------+
| Component   | Value                    | Ref Range        | Performed At    |
+-------------+--------------------------+------------------+-----------------+
| LACTIC ACID | 2.7 (H)Comment: Testing  | 0.4 - 2.0 mmol/L | [facility] |
|             | performed at [facility];[address]     |                  |                 |
|             | [address];[place]   |                  |                 |
|             | [address]                    |                  |                 |
+-------------+--------------------------+------------------+-----------------+
 
 
 
+-------------------+------------------+--------------------+--------------+
| Performing        | Address          | City/State/Zipcode | Phone Number |
| Organization      |                  |                    |              |
+-------------------+------------------+--------------------+--------------+
|   [facility] |   [address] | [place] [address] |              |
+-------------------+------------------+--------------------+--------------+
 TSH (2019  5:39 PM)
 
+-----------+-------------------------+----------------------+-----------------+
| Component | Value                   | Ref Range            | Performed At    |
+-----------+-------------------------+----------------------+-----------------+
| TSH       | 0.904Comment: Testing   | 0.450 - 5.100 uIU/mL | [facility] |
|           | performed at [facility];[address]    |                      |                 |
|           | [address];[place]  |                      |                 |
|           | [address]                   |                      |                 |
+-----------+-------------------------+----------------------+-----------------+
 
 
 
+----------+
| Specimen |
+----------+
| Blood    |
+----------+
 
 
 
+-------------------+------------------+--------------------+--------------+
| Performing        | Address          | City/State/Zipcode | Phone Number |
| Organization      |                  |                    |              |
+-------------------+------------------+--------------------+--------------+
|   [facility] |   [address] | [place] [address] |              |
+-------------------+------------------+--------------------+--------------+
 Folate (2019  5:39 PM)
 
+-----------+--------------------------+------------+--------------+
| Component | Value                    | Ref Range  | Performed At |
+-----------+--------------------------+------------+--------------+
| FOLATE    | 8.0Comment: Testing      | >5.4 ng/mL | [facility]   |
|           | performed at [facility] [address] |            | LABORATORY   |
|           |  [facility],        |            |              |
|           | [place]  [address]     |            |              |
+-----------+--------------------------+------------+--------------+
 
 
 
 
+----------+
| Specimen |
+----------+
| Blood    |
+----------+
 
 
 
+---------------+-------------------------+---------------------+----------------+
| Performing    | Address                 | City/State/Zipcode  | Phone Number   |
| Organization  |                         |                     |                |
+---------------+-------------------------+---------------------+----------------+
|   [facility]  |   [address] [place] [address]  | [place] [address] |   [phone] |
| LABORATORY    | [address]                   |                     |                |
+---------------+-------------------------+---------------------+----------------+
 Vitamin B12 (2019  5:39 PM)
 
+-------------+--------------------------+-------------------+--------------+
| Component   | Value                    | Ref Range         | Performed At |
+-------------+--------------------------+-------------------+--------------+
| VITAMIN B12 | 553Comment: Testing      | 254 - 1,320 pg/mL | [facility]   |
|             | performed at [facility], [address] |                   | LABORATORY   |
|             |  [address],        |                   |              |
|             | [place]  [address]     |                   |              |
+-------------+--------------------------+-------------------+--------------+
 
 
 
+----------+
| Specimen |
+----------+
| Blood    |
+----------+
 
 
 
+---------------+-------------------------+---------------------+----------------+
| Performing    | Address                 | City/State/Zipcode  | Phone Number   |
| Organization  |                         |                     |                |
+---------------+-------------------------+---------------------+----------------+
|   [facility]  |   [address] [facility]  | [place] [address] |   [phone] |
| LABORATORY    | [address]                   |                     |                |
+---------------+-------------------------+---------------------+----------------+
 Blood Culture Set 2 (2019  5:39 PM)
 
+----------------------+------------------------+-----------+-----------------+
| Component            | Value                  | Ref Range | Performed At    |
+----------------------+------------------------+-----------+-----------------+
| Specimen Description | BLOOD, PERIPHERAL DRAW |           | [facility]      |
|                      |                        |           | LABORATORY      |
+----------------------+------------------------+-----------+-----------------+
| SPECIAL REQUESTS     | R HAND                 |           | [facility] |
+----------------------+------------------------+-----------+-----------------+
| CULTURE              | NO GROWTH 6 DAYS       |           | [facility]      |
|                      |                        |           | LABORATORY      |
+----------------------+------------------------+-----------+-----------------+
 
 
 
 
+-----------------+
| Specimen        |
+-----------------+
| Blood - Blood,  |
| peripheral draw |
+-----------------+
 
 
 
+-------------------+-------------------------+---------------------+----------------+
| Performing        | Address                 | City/State/Zipcode  | Phone Number   |
| Organization      |                         |                     |                |
+-------------------+-------------------------+---------------------+----------------+
|   [facility]      |   [address]  | [place] [address] |   [phone] |
| LABORATORY        | [address]                   |                     |                |
+-------------------+-------------------------+---------------------+----------------+
|   [facility] |   [address]        | [place] [address]  |                |
+-------------------+-------------------------+---------------------+----------------+
 Sedimentation Rate (ESR) (2019  5:39 PM)
 
+-----------+-------------------------+--------------+-----------------+
| Component | Value                   | Ref Range    | Performed At    |
+-----------+-------------------------+--------------+-----------------+
| ESR       | 13Comment: Testing      | 0 - 30 mm/Hr | [facility] |
|           | performed at [facility];[address]    |              |                 |
|           | [address];[place]  |              |                 |
|           | [address]                   |              |                 |
+-----------+-------------------------+--------------+-----------------+
 
 
 
+----------+
| Specimen |
+----------+
| Blood    |
+----------+
 
 
 
+-------------------+------------------+--------------------+--------------+
| Performing        | Address          | City/State/Zipcode | Phone Number |
| Organization      |                  |                    |              |
+-------------------+------------------+--------------------+--------------+
|   [facility] |   [address] | [place] [address] |              |
+-------------------+------------------+--------------------+--------------+
 Comprehensive metabolic panel (2019  5:39 PM)
 
+----------------+--------------------------+-------------------+-----------------+
| Component      | Value                    | Ref Range         | Performed At    |
+----------------+--------------------------+-------------------+-----------------+
| SODIUM         | 143                      | 135 - 145 mmol/L  | [facility] |
+----------------+--------------------------+-------------------+-----------------+
| POTASSIUM      | 4.7                      | 3.5 - 4.9 mmol/L  | [facility] |
+----------------+--------------------------+-------------------+-----------------+
| CHLORIDE       | 96 (L)                   | 99 - 109 mmol/L   | [facility] |
+----------------+--------------------------+-------------------+-----------------+
| CO2            | >40 (HH)Comment: CALLED  | 23 - 32 mmol/L    | [facility] |
|                | NURSING UNITREAD BACK    |                   |                 |
|                | RESULTS VERIFIEDCALLED   |                   |                 |
 
|                | TO RN IN ED AT 1830 BY   |                   |                 |
|                | LGJ                      |                   |                 |
|                |                          |                   |                 |
+----------------+--------------------------+-------------------+-----------------+
| ANION GAP AGAP | UNABLE TO CALCULATE      | 5 - 20 mmol/L     | [facility] |
+----------------+--------------------------+-------------------+-----------------+
| GLUCOSE        | 160 (H)                  | 65 - 99 mg/dL     | [facility] |
+----------------+--------------------------+-------------------+-----------------+
| BUN            | 9                        | 8 - 25 mg/dL      | [facility] |
+----------------+--------------------------+-------------------+-----------------+
| CREATININE     | 2.96 (H)                 | 0.50 - 1.00 mg/dL | [facility] |
+----------------+--------------------------+-------------------+-----------------+
| BUN/CREAT      | 3                        |                   | [facility] |
+----------------+--------------------------+-------------------+-----------------+
| CALCIUM        | 9.4                      | 8.5 - 10.5 mg/dL  | [facility] |
+----------------+--------------------------+-------------------+-----------------+
| TOTAL PROTEIN  | 6.7                      | 6.3 - 8.2 g/dL    | [facility] |
+----------------+--------------------------+-------------------+-----------------+
| Albumin        | 4.3                      | 3.3 - 4.8 g/dL    | [facility] |
+----------------+--------------------------+-------------------+-----------------+
| GLOBULIN       | 2.4                      | 1.3 - 4.9 g/dL    | [facility] |
+----------------+--------------------------+-------------------+-----------------+
| A/G            | 1.8                      | 1.0 - 2.4         | [facility] |
+----------------+--------------------------+-------------------+-----------------+
| TBIL           | 0.5                      | 0.1 - 1.5 mg/dL   | [facility] |
+----------------+--------------------------+-------------------+-----------------+
| ALK PHOS       | 103                      | 35 - 115 U/L      | [facility] |
+----------------+--------------------------+-------------------+-----------------+
| AST            | 54 (H)                   | 10 - 45 U/L       | [facility] |
+----------------+--------------------------+-------------------+-----------------+
| ALT            | 40                       | 10 - 65 U/L       | [facility] |
+----------------+--------------------------+-------------------+-----------------+
| EGFR           | 16 (L)Comment: GFR <60:  | >60 mL/min/1.73m2 | [facility] |
|                | CHRONIC KIDNEY DISEASE,  |                   |                 |
|                | IF FOUND OVER A 3 MONTH  |                   |                 |
|                | PERIOD.GFR <15: KIDNEY   |                   |                 |
|                | FAILURE.FOR       |                   |                 |
|                | [employer], MULTIPLY THE  |                   |                 |
|                | CALCULATED GFR BY        |                   |                 |
|                | 1.210.This eGFR is       |                   |                 |
|                | calculated using the     |                   |                 |
|                | MDRD IDMS traceable      |                   |                 |
|                | equation.Testing         |                   |                 |
|                | performed at [facility];[address]     |                   |                 |
|                | [facility];[place], [place]   |                   |                 |
|                | [address]                    |                   |                 |
+----------------+--------------------------+-------------------+-----------------+
 
 
 
+----------+
| Specimen |
+----------+
| Blood    |
+----------+
 
 
 
+-------------------+------------------+--------------------+--------------+
| Performing        | Address          | City/State/Zipcode | Phone Number |
 
| Organization      |                  |                    |              |
+-------------------+------------------+--------------------+--------------+
|   [facility] |   [address] | [place] [address] |              |
+-------------------+------------------+--------------------+--------------+
 CBC with differential (2019  5:39 PM)
 
+-------------------+------------------------+-------------------+-----------------+
| Component         | Value                  | Ref Range         | Performed At    |
+-------------------+------------------------+-------------------+-----------------+
| WBC               | 5.14                   | 3.80 - 11.00 K/uL | [facility] |
+-------------------+------------------------+-------------------+-----------------+
| RBC               | 2.91 (L)               | 3.70 - 5.10 M/uL  | [facility] |
+-------------------+------------------------+-------------------+-----------------+
| HGB               | 10.1 (L)               | 11.3 - 15.5 g/dL  | [facility] |
+-------------------+------------------------+-------------------+-----------------+
| HCT               | 30.9 (L)               | 34.0 - 46.0 %     | [facility] |
+-------------------+------------------------+-------------------+-----------------+
| MCV               | 106.1 (H)              | 80.0 - 100.0 fl   | [facility] |
+-------------------+------------------------+-------------------+-----------------+
| MCH               | 34.8 (H)               | 27.0 - 34.0 pg    | [facility] |
+-------------------+------------------------+-------------------+-----------------+
| MCHC              | 32.8                   | 32.0 - 35.5 g/dL  | [facility] |
+-------------------+------------------------+-------------------+-----------------+
| RDW SD            | 61.3 (H)               | 37 - 53 fl        | [facility] |
+-------------------+------------------------+-------------------+-----------------+
| PLT               | 145 (L)                | 150 - 400 K/uL    | [facility] |
+-------------------+------------------------+-------------------+-----------------+
| MPV               | 9.3                    | fl                | [facility] |
+-------------------+------------------------+-------------------+-----------------+
| DIFF TYPE         | AUTOMATED              |                   | [facility] |
+-------------------+------------------------+-------------------+-----------------+
| NEUTROPHILS       | 74.03                  | %                 | [facility] |
+-------------------+------------------------+-------------------+-----------------+
| LYMPHOCYTES       | 20.20                  | %                 | [facility] |
+-------------------+------------------------+-------------------+-----------------+
| MONOCYTES         | 5.16                   | %                 | [facility] |
+-------------------+------------------------+-------------------+-----------------+
| EOSINOPHILS       | 0.03                   | %                 | [facility] |
+-------------------+------------------------+-------------------+-----------------+
| BASOPHILS         | 0.58                   | %                 | [facility] |
+-------------------+------------------------+-------------------+-----------------+
| NEUTROPHILS ABS   | 3.81                   | 1.90 - 7.40 K/uL  | [facility] |
+-------------------+------------------------+-------------------+-----------------+
| LYMPHOCYTES ABS   | 1.04                   | 1.00 - 3.90 K/uL  | [facility] |
+-------------------+------------------------+-------------------+-----------------+
| MONOCYTES ABS     | 0.27                   | 0.00 - 0.80 K/uL  | [facility] |
+-------------------+------------------------+-------------------+-----------------+
| EOSINOPHILS ABS   | 0.00                   | 0.00 - 0.50 K/uL  | [facility] |
+-------------------+------------------------+-------------------+-----------------+
| BASOPHILS ABS     | 0.03                   | 0.00 - 0.10 K/uL  | [facility] |
+-------------------+------------------------+-------------------+-----------------+
| MORPHOLOGY        | 1+                     |                   | [facility] |
|                   | Comment:               |                   |                 |
|                   | ANISO                  |                   |                 |
|                   | 1+                     |                   |                 |
|                   | MACRO                  |                   |                 |
|                   | NORMAL PLT MORPH       |                   |                 |
|                   |                        |                   |                 |
+-------------------+------------------------+-------------------+-----------------+
| Platelet Estimate | DECREASEDComment:      |                   | [facility] |
 
|                   | Testing performed at   |                   |                 |
|                   | [facility];[address] [place] [address]          |                   |                 |
|                   | [place] [address] |                   |                 |
+-------------------+------------------------+-------------------+-----------------+
 
 
 
+----------+
| Specimen |
+----------+
| Blood    |
+----------+
 
 
 
+-------------------+------------------+--------------------+--------------+
| Performing        | Address          | City/State/Zipcode | Phone Number |
| Organization      |                  |                    |              |
+-------------------+------------------+--------------------+--------------+
|   [facility] |   [address] | [place] [address] |              |
+-------------------+------------------+--------------------+--------------+
 C-Reactive Protein (2019  5:39 PM)
 
+-----------+--------------------------+------------+-----------------+
| Component | Value                    | Ref Range  | Performed At    |
+-----------+--------------------------+------------+-----------------+
| CRP       | 0.8 (H)Comment: Testing  | <0.5 mg/dL | [facility] |
|           | performed at [facility] [place]     |            |                 |
|           | [address];[place]   |            |                 |
|           | [id number]                    |            |                 |
+-----------+--------------------------+------------+-----------------+
 
 
 
+----------+
| Specimen |
+----------+
| Blood    |
+----------+
 
 
 
+-------------------+------------------+--------------------+--------------+
| Performing        | Address          | City/State/Zipcode | Phone Number |
| Organization      |                  |                    |              |
+-------------------+------------------+--------------------+--------------+
|   [facility] |   [address] | [place] [address] |              |
+-------------------+------------------+--------------------+--------------+
 XR Toe Right 2 View (2019  5:08 PM)
 
+----------------------------------------------------------------------+--------------+
| Impressions                                                          | Performed At |
+----------------------------------------------------------------------+--------------+
|   No radiographic evidence of osteomyelitis seen.    If further      |   [facility]     |
| assessment  is warranted, consider correlation with MRI.  Potential  | RADIOLOGY    |
| acute fracture through the base of the distal phalanx.  Signed by:   |              |
| Gavin South  Sign Date/Time: 2019 5:15 PM                      |              |
+----------------------------------------------------------------------+--------------+
 
 
 
 
+------------------------------------------------------------------------+--------------+
| Narrative                                                              | Performed At |
+------------------------------------------------------------------------+--------------+
|   RIGHT TOE  CLINICAL INFORMATION:  Other (see comments) Pain, concern |   [facility]     |
|  for osteomyelitis.  COMPARISON:  XR FOOT LEFT (2018); XR FOOT   | RADIOLOGY    |
| LEFT (2018); XR FOOT LEFT  (2018);  FINDINGS:  Advanced     |              |
| degenerative changes identified involving the interphalangeal  joint   |              |
| of the 1st digit.    On the lateral view, there appears to be  lucency |              |
|  through the base of the phalanx, potentially reflecting an  acute     |              |
| fracture.    No erosive or destructive changes appreciated to  suggest |              |
|  osteomyelitis.                                                        |              |
+------------------------------------------------------------------------+--------------+
 
 
 
+------------------------------------------------------------------------+
| Procedure Note                                                         |
+------------------------------------------------------------------------+
|   Denny, Rad Results In - 2019  5:18 PM PST  RIGHT TOE             |
| CLINICAL INFORMATION:                                                  |
| Other (see comments) Pain, concern for osteomyelitis.                  |
| COMPARISON:                                                            |
| XR FOOT LEFT (2018); XR FOOT LEFT (2018); XR FOOT LEFT     |
| (2018);                                                           |
| FINDINGS:                                                              |
| Advanced degenerative changes identified involving the interphalangeal |
| joint of the 1st digit.  On the lateral view, there appears to be      |
| lucency through the base of the phalanx, potentially reflecting an     |
| acute fracture.  No erosive or destructive changes appreciated to      |
| suggest osteomyelitis.                                                 |
| IMPRESSION:                                                            |
| No radiographic evidence of osteomyelitis seen.  If further assessment |
| is warranted, consider correlation with MRI.                           |
| Potential acute fracture through the base of the distal phalanx.       |
| Signed by: Gavin South                                                 |
| Sign Date/Time: 2019 5:15 PM                                     |
+------------------------------------------------------------------------+
 
 
 
+--------------------+------------------+--------------------+--------------+
| Performing         | Address          | City/State/Zipcode | Phone Number |
| Organization       |                  |                    |              |
+--------------------+------------------+--------------------+--------------+
|   [facility] |   [address] | [place] [address] |              |
+--------------------+------------------+--------------------+--------------+
 Septic Lactic Acid (2019  4:55 PM)
 
+-------------+--------------------------+------------------+-----------------+
| Component   | Value                    | Ref Range        | Performed At    |
+-------------+--------------------------+------------------+-----------------+
| LACTIC ACID | 2.7 (H)Comment: Testing  | 0.4 - 2.0 mmol/L | [facility] |
|             | performed at [facility] [place];[address]     |                  |                 |
|             | [address];[place]   |                  |                 |
|             | [id number]                    |                  |                 |
+-------------+--------------------------+------------------+-----------------+
 
 
 
 
+-------------------+------------------+--------------------+--------------+
| Performing        | Address          | City/State/Zipcode | Phone Number |
| Organization      |                  |                    |              |
+-------------------+------------------+--------------------+--------------+
|   [facility] |   [address] | [place] [address] |              |
+-------------------+------------------+--------------------+--------------+
 Blood Culture Set 1 (2019  4:55 PM)
 
+----------------------+------------------+-----------+-----------------+
| Component            | Value            | Ref Range | Performed At    |
+----------------------+------------------+-----------+-----------------+
| Specimen Description | BLOOD, LINE DRAW |           | [facility]      |
|                      |                  |           | LABORATORY      |
+----------------------+------------------+-----------+-----------------+
| SPECIAL REQUESTS     | R FOREARM        |           | [facility] |
+----------------------+------------------+-----------+-----------------+
| CULTURE              | NO GROWTH 6 DAYS |           | [facility]      |
|                      |                  |           | LABORATORY      |
+----------------------+------------------+-----------+-----------------+
 
 
 
+---------------------+
| Specimen            |
+---------------------+
| Blood - Blood Line  |
| Draw                |
+---------------------+
 
 
 
+-------------------+-------------------------+---------------------+----------------+
| Performing        | Address                 | City/State/Zipcode  | Phone Number   |
| Organization      |                         |                     |                |
+-------------------+-------------------------+---------------------+----------------+
|   [facility]      |   [address]  | [place] [address] |   [phone] |
| LABORATORY        | [address]                   |                     |                |
+-------------------+-------------------------+---------------------+----------------+
|   [facility] |   [address]        | [place] [address]  |                |
+-------------------+-------------------------+---------------------+----------------+
 ED INFORMATION EXCHANGE (2019  3:21 PM)
 
+------------------------------------------------------------------------+--------------+
| Narrative                                                              | Performed At |
+------------------------------------------------------------------------+--------------+
|   [id number]     Criteria Met         Care        |   ED         |
| Guidelines      10 in 12     Security and Safety  No recent Security   | INFORMATION  |
| Events currently on file     ED Care Guidelines from [facility]        | EXCHANGE     |
| [place]  Last Updated: 18 10:16 AM         Other Information:    |              |
| Currently being seen by [facility] in [place] for mental health        |              |
| [phone]  These are guidelines and the provider should    |              |
| exercise clinical judgment when providing care.  ED Care Guidelines    |              |
| from [facility]  Last Updated: 17 10:44 AM         Care |              |
|  Coordination:  This patient has been identified as having at          |              |
| leastEmergency Departments visits in the 12 months immediately         |              |
| preceding the date these guidelines were entered.Patient requires      |              |
| education on appropriate ED usage.Emphasize the importance of using    |              |
| outpatient   medical services for the treatment of chronic conditions. |              |
|   Please contact Community Health WorkerWillard at [phone] if   |              |
| patient is seen in ED.  These are guidelines and the provider should   |              |
 
| exercise clinical judgment when providing care.  These are guidelines  |              |
| and the provider should exercise clinical judgment when providing      |              |
| care.           Prescription Drug Report (12 Mo.)  PDMP query found no |              |
|  report.        E.D. Visit Count (12 mo.)  Facility Visits Low Acuity  |              |
|   [facility] 18 0   [facility] 2 0   [facility]   |              |
| [facility] 5 0   Total 25 0   Note: Visits indicate total |              |
|  known visits. Medicaid Low Acuity Dx are the number of primary        |              |
| diagnoses on the Medicaid's Low Acuity dx list.         Recent         |              |
| Emergency Department Visit Summary  Showing 10 most recent visits out  |              |
| of 25 in the past 12 months  Date Facility City State Type Diagnoses   |              |
| or Chief Complaint   2019 [facility]       |              |
| Emergency       2019 [facility]            |              |
| Emergency          Multiple Falls        Referral        Circulatory   |              |
| Problem      2019 [facility] [place] Emergency      |              |
|      ABD PAIN        Other chest pain      2019 [facility]         |              |
| [facility] Emergency          Foot Pain      2019 |              |
|  [facility] Emergency          Chest Pain          |              |
| Nausea        Shortness of Breath        Chest pain, unspecified       |              |
|      Elevated blood-pressure reading, without diagnosis of             |              |
| hypertension        Presence of aortocoronary bypass graft             |              |
| Other specified postprocedural states      2019 [facility]  |              |
| [facility] [place] Emergency          CHEST PAIN        Abnormal levels  |              |
| of other serum enzymes        Personal history of other diseases of    |              |
| the circulatory system        Chest pain, unspecified      2019 |              |
|  [facility] [place] Emergency          FISTULA BLEEDING    |              |
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
| grafts, initial encounter      Dec 22, 2018 [facility]       |              |
| [place] Emergency          CHEST PAIN        Type 2 diabetes         |              |
| mellitus with hyperglycemia        Chest pain, unspecified      Nov    |              |
| 2018 [facility] [place] Emergency    Chief Complaint:   |              |
| SOB      2018 [facility] [place] Emergency         |              |
|     FALL        Unspecified fall, initial encounter        Dependence  |              |
| on renal dialysis        End stage renal disease            Recent     |              |
| Inpatient Visit Summary  Showing 10 most recent visits out of 11 in    |              |
| the past 12 months  Date Facility City State Type Diagnoses or Chief   |              |
| Complaint   2019 [facility] Vascular       |              |
| Surgery          Foot Pain        End stage renal disease              |              |
| Dependence on renal dialysis        Peripheral vascular disease,       |              |
| unspecified        Anemia in chronic kidney disease        Other       |              |
| disorders of plasma-protein metabolism, not elsewhere classified       |              |
|      Other specified personal risk factors, not elsewhere classified   |              |
|     Dec 27, 2018 [facility] Recovery               |              |
|     Peripheral arterial disease, osteomyelitis left foot        Other  |              |
| acute osteomyelitis, left ankle and foot        Long term (current)    |              |
| use of antibiotics        End stage renal disease        Anemia in     |              |
| chronic kidney disease      Dec 5, 2018 [facility] |              |
|  General Medicine          Peripheral vascular disease, unspecified    |              |
|      End stage renal disease        Dependence on renal dialysis       |              |
|      Long term (current) use of insulin        Other chronic           |              |
| osteomyelitis, left ankle and foot        Type 2 diabetes mellitus     |              |
| with diabetic chronic kidney disease        Essential (primary)        |              |
| hypertension      2018 [facility]          |              |
| Inpatient          Upper GIB        Other chronic osteomyelitis,       |              |
| unspecified ankle and foot        End stage renal disease        Other |              |
|  specified personal risk factors, not elsewhere classified             |              |
| Chronic systolic (congestive) heart failure        Anemia in chronic   |              |
| kidney disease        Other disorders of plasma-protein metabolism,    |              |
| not elsewhere classified        Moderate protein-calorie malnutrition  |              |
|        Other disorders of phosphorus metabolism      2018      |              |
| [facility] [place] [facility]          1. Type 2      |              |
 
| diabetes mellitus with other specified complication        2. Urinary  |              |
| tract infection, site not specified        3. Unspecified              |              |
| protein-calorie malnutrition        4. Other chronic osteomyelitis,    |              |
| unspecified site        5. Hypertensive heart and chronic kidney       |              |
| disease with heart failure and with stage 5 chronic kidney disease, or |              |
|  end stage renal disease        6. Chronic diastolic (congestive)      |              |
| heart failure        7. Other osteomyelitis, ankle and foot        8.  |              |
| Type 2 diabetes mellitus with foot ulcer        9. Atherosclerotic     |              |
| heart disease of native coronary artery without angina pectoris        |              |
|  10. Chronic obstructive pulmonary disease, unspecified      ,    |              |
|  [facility] [place] [facility]          1. Benign |              |
|  paroxysmal vertigo, unspecified ear        2. End stage renal disease |              |
|         3. Hypertensive chronic kidney disease with stage 5 chronic    |              |
| kidney disease or end stage renal disease        4. Urinary tract      |              |
| infection, site not specified        5. Hypertensive heart and chronic |              |
|  kidney disease with heart failure and with stage 5 chronic kidney     |              |
| disease, or end stage renal disease        6. Kidney transplant status |              |
|         7. Unspecified systolic (congestive) heart failure        8.   |              |
| Syncope and collapse        9. Type 2 diabetes mellitus with diabetic  |              |
| chronic kidney disease        10. Atherosclerotic heart disease of     |              |
| native coronary artery without angina pectoris      Sep 15, 2018       |              |
| [facility] [place] [facility]          Acute     |              |
| ischemic heart disease, unspecified        Long term (current) use of  |              |
| insulin        Dependence on renal dialysis        End stage renal     |              |
| disease        Type 2 diabetes mellitus with diabetic chronic kidney   |              |
| disease        Essential (primary) hypertension        Anemia in       |              |
| chronic kidney disease      2018 [facility] |              |
|  Medical Surgical          0. Cough        1. Pneumonia due to         |              |
| Pseudomonas        2. End stage renal disease        3. Hypertensive   |              |
| heart and chronic kidney disease with heart failure and with stage 5   |              |
| chronic kidney disease, or end stage renal disease        4. Cachexia  |              |
|        5. Chronic obstructive pulmonary disease with acute lower       |              |
| respiratory infection        6. Type 2 diabetes mellitus with diabetic |              |
|  chronic kidney disease        7. Heart failure, unspecified        8. |              |
|  Hyperlipidemia, unspecified        9. Gastro-esophageal reflux        |              |
| disease without esophagitis      2018 [facility]       |              |
| [facility]. [place] [facility]          0. Other chest pain        1.      |              |
| Gastro-esophageal reflux disease without esophagitis        2. End     |              |
| stage renal disease        3. Hypertensive heart and chronic kidney    |              |
| disease with heart failure and with stage 5 chronic kidney disease, or |              |
|  end stage renal disease        4. Chronic systolic (congestive) heart |              |
|  failure        5. Atherosclerotic heart disease of native coronary    |              |
| artery with other forms of angina pectoris        6. Type 2 diabetes   |              |
| mellitus with diabetic chronic kidney disease        7.                |              |
| Hyperlipidemia, unspecified        8. Major depressive disorder,       |              |
| single episode, unspecified        9. Chronic obstructive pulmonary    |              |
| disease, unspecified      Mar 6, 2018 [facility]     |              |
| [facility]. [place] [facility]          Heart Failure        Need for iv access  |              |
|        Type 2 diabetes mellitus with other specified complication      |              |
|      Long term (current) use of insulin        Paroxysmal atrial       |              |
| fibrillation        Anemia in chronic kidney disease                   |              |
| Atherosclerotic heart disease of native coronary artery without angina |              |
|  pectoris        Cardiomyopathy, unspecified        End stage renal    |              |
| disease        Other specified postprocedural states            Care   |              |
| Providers  Provider PRC Type Phone Fax Service Dates   HEADINGS, SANTIAGO, |              |
|  F.N.P. Nurse Practitioner: Family     Current      Marco Antonio Martinez     |              |
| /Care Coordinator [phone]    2019 -          |              |
| Current      Marco Antonio Martinez Primary Care [phone]    2019 |              |
|  - Current      [facility] Primary            |              |
| Care    [phone] Current      [facility]      |              |
 
| [facility]     Current      MANOLO GONZALEZ Primary Care         |              |
| Current      [employer] Mental Health Provider [phone] |              |
|  [phone] Current      [employer] Case or Care Manager     Current      |              |
|  MIRTA Goel Case or Care Manager [phone]  |              |
| [phone] Current      Jairo Gutierrez MD Other     Current     |              |
|      [employer]  This patient has registered at the [facility]      |              |
| [facility] Emergency Department   For more information    |              |
| visit:                                                                 |              |
| [email or web address] |              |
| [id number]   The above information is provided for the sole purpose of     |              |
| patient treatment. Use of this information beyond the terms of Data    |              |
| Sharing Memorandum of Understanding and License Agreement is           |              |
| prohibited. In certain cases not all visits may be represented.        |              |
| Consult the aforementioned facilities for additional information.      |              |
| 2019 [employer] - [place], [place] -      |              |
| [email or web address]                                         |              |
+------------------------------------------------------------------------+--------------+
 
 
 
+-------------------------------------------------------------------------------------------
--------------------------------------------------------------------------------------------
--------------------+
| Procedure Note                                                                            
                                                                                            
                    |
+-------------------------------------------------------------------------------------------
--------------------------------------------------------------------------------------------
--------------------+
|   Interface, Lab - 2019  3:22 PM PST  Formatting of this note may be different      
                                                                                            
                    |
| from the original.[id number] [facility]  Care Guidelines  10 in     
                                                                                            
                    |
| 12Security and SafetyNo recent Security Events currently on fileED Care Guidelines from   
                                                                                            
                    |
| [facility] Updated: 18 10:16 AM  Other Information:Currently being      
                                                                                            
                    |
| seen by [facility] in [place] for mental health concerns.[phone] are            
                                                                                            
                    |
| guidelines and the provider should exercise clinical judgment when providing care.ED      
                                                                                            
                    |
| Care Guidelines from [facility] Updated: 17 10:44 AM  Care             
                                                                                            
                    |
| Coordination:This patient has been identified as having at leastEmergency Departments     
                                                                                            
                    |
| visits in the 12 months immediately preceding the date these guidelines were              
                                                                                            
                    |
| entered.Patient requires education on appropriate ED usage.Emphasize the importance of    
                                                                                            
                    |
| using outpatient medical services for the treatment of chronic conditions.Please contact  
 
                                                                                            
                    |
|  Community Health WorkerWillard at [phone] if patient is seen in ED.These are      
                                                                                            
                    |
| guidelines and the provider should exercise clinical judgment when providing care.These   
                                                                                            
                    |
| are guidelines and the provider should exercise clinical judgment when providing          
                                                                                            
                    |
| care.Prescription Drug Report (12 Mo.)PDMP query found no report.E.D. Visit Count (12     
                                                                                            
                    |
| mo.)Facility Visits Low Acuity [facility] 18 0 [facility] 2 0    
                                                                                            
                    |
| [facility] 5 0 Total 25 0 Note: Visits indicate total known visits.   
                                                                                            
                    |
| Medicaid Low Acuity Dx are the number of primary diagnoses on the Medicaid's Low Acuity   
                                                                                            
                    |
| dx list.  Recent Emergency Department Visit SummaryShowing 10 most recent visits out of   
                                                                                            
                    |
| 25 in the past 12 monthsDate Facility City State Type Diagnoses or Chief Complaint Feb    
                                                                                            
                    |
| 2019 [facility] [place] Emergency   2019 [facility] [place]     
                                                                                            
                    |
| [facility] Emergency    Multiple Falls    Referral    Circulatory Problem  2019     
                                                                                            
                    |
| [facility] [place] Emergency    ABD PAIN    Other chest pain  2019    
                                                                                            
                    |
| [place] [place] Emergency    Foot Pain  2019 [facility] [place]  
                                                                                            
                    |
|  [facility] Emergency    Chest Pain    Nausea    Shortness of Breath    Chest pain,        
                                                                                            
                    |
| unspecified    Elevated blood-pressure reading, without diagnosis of hypertension         
                                                                                            
                    |
| Presence of aortocoronary bypass graft    Other specified postprocedural states  ,  
                                                                                            
                    |
|   [facility] [place] Emergency    CHEST PAIN    Abnormal levels of other  
                                                                                            
                    |
|  serum enzymes    Personal history of other diseases of the circulatory system    Chest   
                                                                                            
                    |
| pain, unspecified  2019 [facility] [place] Emergency    FISTULA        
                                                                                            
                    |
| BLEEDING    Hemorrhage due to vascular prosthetic devices, implants and grafts, initial   
 
                                                                                            
                    |
| encounter  Dec 22, 2018 [facility] [place] Emergency    CHEST PAIN    Type 2  
                                                                                            
                    |
|  diabetes mellitus with hyperglycemia    Chest pain, unspecified  2018 [facility]      
                                                                                            
                    |
| [place] Emergency  Chief Complaint: SOB  2018 [facility]       
                                                                                            
                    |
| [facility] [place] Emergency    FALL    Unspecified fall, initial encounter    Dependence   
                                                                                            
                    |
| on renal dialysis    End stage renal disease  Recent Inpatient Visit SummaryShowing 10    
                                                                                            
                    |
| most recent visits out of 11 in the past 12 monthsDate Facility City State Type           
                                                                                            
                    |
| Diagnoses or Chief Complaint 2019 [facility] [place] Vascular         
                                                                                            
                    |
| Surgery    Foot Pain    End stage renal disease    Dependence on renal dialysis           
                                                                                            
                    |
| Peripheral vascular disease, unspecified    Anemia in chronic kidney disease    Other     
                                                                                            
                    |
| disorders of plasma-protein metabolism, not elsewhere classified    Other specified       
                                                                                            
                    |
| personal risk factors, not elsewhere classified  Dec 27, 2018 [facility] [place]        
                                                                                            
                    |
| [place] Recovery    Peripheral arterial disease, osteomyelitis left foot    Other       
                                                                                            
                    |
| acute osteomyelitis, left ankle and foot    Long term (current) use of antibiotics        
                                                                                            
                    |
| End stage renal disease    Anemia in chronic kidney disease  Dec 5, 2018 [facility]  
                                                                                            
                    |
  [place] General Medicine    Peripheral vascular disease, unspecified    End       
                                                                                            
                    |
| stage renal disease    Dependence on renal dialysis    Long term (current) use of         
                                                                                            
                    |
| insulin    Other chronic osteomyelitis, left ankle and foot    Type 2 diabetes mellitus   
                                                                                            
                    |
| with diabetic chronic kidney disease    Essential (primary) hypertension  2018    
                                                                                            
                    |
| [facility] [place] Inpatient    Upper GIB    Other chronic osteomyelitis,     
                                                                                            
                    |
| unspecified ankle and foot    End stage renal disease    Other specified personal risk    
 
                                                                                            
                    |
| factors, not elsewhere classified    Chronic systolic (congestive) heart failure          
                                                                                            
                    |
| Anemia in chronic kidney disease    Other disorders of plasma-protein metabolism, not     
                                                                                            
                    |
| elsewhere classified    Moderate protein-calorie malnutrition    Other disorders of       
                                                                                            
                    |
| phosphorus metabolism  2018 [facility] [place] Medical Surgical    1.  
                                                                                            
                    |
|  Type 2 diabetes mellitus with other specified complication    2. Urinary tract           
                                                                                            
                    |
| infection, site not specified    3. Unspecified protein-calorie malnutrition    4. Other  
                                                                                            
                    |
|  chronic osteomyelitis, unspecified site    5. Hypertensive heart and chronic kidney      
                                                                                            
                    |
| disease with heart failure and with stage 5 chronic kidney disease, or end stage renal    
                                                                                            
                    |
| disease    6. Chronic diastolic (congestive) heart failure    7. Other osteomyelitis,     
                                                                                            
                    |
| ankle and foot    8. Type 2 diabetes mellitus with foot ulcer    9. Atherosclerotic       
                                                                                            
                    |
| heart disease of native coronary artery without angina pectoris    10. Chronic            
                                                                                            
                    |
| obstructive pulmonary disease, unspecified  [facility] [place] [place]     
                                                                                            
                    |
| [facility]    1. Benign paroxysmal vertigo, unspecified ear    2. End stage renal   
                                                                                            
                    |
| disease    3. Hypertensive chronic kidney disease with stage 5 chronic kidney disease or  
                                                                                            
                    |
|  end stage renal disease    4. Urinary tract infection, site not specified    5.          
                                                                                            
                    |
| Hypertensive heart and chronic kidney disease with heart failure and with stage 5         
                                                                                            
                    |
| chronic kidney disease, or end stage renal disease    6. Kidney transplant status    7.   
                                                                                            
                    |
| Unspecified systolic (congestive) heart failure    8. Syncope and collapse    9. Type 2   
                                                                                            
                    |
| diabetes mellitus with diabetic chronic kidney disease    10. Atherosclerotic heart       
                                                                                            
                    |
| disease of native coronary artery without angina pectoris  Sep 15, 2018 [facility]    
 
                                                                                            
                    |
| [place] Medical Surgical    Acute ischemic heart disease, unspecified         
                                                                                            
                    |
| Long term (current) use of insulin    Dependence on renal dialysis    End stage renal     
                                                                                            
                    |
| disease    Type 2 diabetes mellitus with diabetic chronic kidney disease    Essential     
                                                                                            
                    |
| (primary) hypertension    Anemia in chronic kidney disease  2018 [facility]            
                                                                                            
                    |
| [place] Medical Surgical    0. Cough    1. Pneumonia due to Pseudomonas   
                                                                                            
                    |
|    2. End stage renal disease    3. Hypertensive heart and chronic kidney disease with    
                                                                                            
                    |
| heart failure and with stage 5 chronic kidney disease, or end stage renal disease    4.   
                                                                                            
                    |
| Cachexia    5. Chronic obstructive pulmonary disease with acute lower respiratory         
                                                                                            
                    |
| infection    6. Type 2 diabetes mellitus with diabetic chronic kidney disease    7.       
                                                                                            
                    |
| Heart failure, unspecified    8. Hyperlipidemia, unspecified    9. Gastro-esophageal      
                                                                                            
                    |
| reflux disease without esophagitis  2018 [facility] [place] Medical     
                                                                                            
                    |
| Surgical    0. Other chest pain    1. Gastro-esophageal reflux disease without            
                                                                                            
                    |
| esophagitis    2. End stage renal disease    3. Hypertensive heart and chronic kidney     
                                                                                            
                    |
| disease with heart failure and with stage 5 chronic kidney disease, or end stage renal    
                                                                                            
                    |
| disease    4. Chronic systolic (congestive) heart failure    5. Atherosclerotic heart     
                                                                                            
                    |
| disease of native coronary artery with other forms of angina pectoris    6. Type 2        
                                                                                            
                    |
| diabetes mellitus with diabetic chronic kidney disease    7. Hyperlipidemia, unspecified  
                                                                                            
                    |
|     8. Major depressive disorder, single episode, unspecified    9. Chronic obstructive   
                                                                                            
                    |
| pulmonary disease, unspecified  Mar 6, 2018 [facility]        
                                                                                            
                    |
| Cardiology    Heart Failure    Need for iv access    Type 2 diabetes mellitus with other  
 
                                                                                            
                    |
|  specified complication    Long term (current) use of insulin    Paroxysmal atrial        
                                                                                            
                    |
| fibrillation    Anemia in chronic kidney disease    Atherosclerotic heart disease of      
                                                                                            
                    |
| native coronary artery without angina pectoris    Cardiomyopathy, unspecified    End      
                                                                                            
                    |
| stage renal disease    Other specified postprocedural states  [facility]  
                                                                                            
                    |
|  Type Phone Fax Service Dates HEADINGS, CARLOS ALBERTO HENDERSON. Nurse Practitioner: Family           
                                                                                            
                    |
| Current  Marco Antonio Martinez /Care Coordinator [phone]  2019 -       
                                                                                            
                    |
| Current  [facility] Martinez Primary [facility] [phone]  2019 - Current  [facility]        
                                                                                            
                    |
| [facility] Primary Care  [phone] Current  [facility]  
                                                                                            
                    |
|  [facility] Primary Care   Current  MANOLO GONZALEZ Primary Care   Current  [employer],    
                                                                                            
                    |
| [facility] Mental Health Provider [phone] Current  [employer] Case or Care  
                                                                                            
                    |
|  Manager   Current  MIRTA Goel Case or Care Manager [phone]  
                                                                                            
                    |
|  [phone] Current  Jairo Gutierrez MD Other   Current  [employer]      
                                                                                            
                    |
| patient has registered at the [facility] Emergency Department For     
                                                                                            
                    |
| more information visit:                                                                   
                                                                                            
                    |
| [email or web address] The above    
                                                                                            
                    |
| information is provided for the sole purpose of patient treatment. Use of this            
                                                                                            
                    |
| information beyond the terms of Data Sharing Memorandum of Understanding and License      
                                                                                            
                    |
| Agreement is prohibited. In certain cases not all visits may be represented. Consult the  
                                                                                            
                    |
|  aforementioned facilities for additional information. 2019 [employer]            
                                                                                            
                    |
| [employer] [place], [place] - [email or web address]                  
 
                                                                                            
                    |
|   End stage renal disease                                                                 
                                                                                            
                    |
|   Dependence on renal dialysis                                                            
                                                                                            
                    |
|   Long term (current) use of insulin                                                      
                                                                                            
                    |
|   Other chronic osteomyelitis, left ankle and foot                                        
                                                                                            
                    |
|   Type 2 diabetes mellitus with diabetic chronic kidney disease                           
                                                                                            
                    |
|   Essential (primary) hypertension                                                        
                                                                                            
                    |
|                                                                                           
                                                                                            
                    |
|[facility] [place] [place] [facility]                                      
                                                                                            
                    |
|  Upper GIB                                                                                
                                                                                            
                    |
|   Other chronic osteomyelitis, unspecified ankle and foot                                 
                                                                                            
                    |
|   End stage renal disease                                                                 
                                                                                            
                    |
|   Other specified personal risk factors, not elsewhere classified                         
                                                                                            
                    |
|   Chronic systolic (congestive) heart failure                                             
                                                                                            
                    |
|   Anemia in chronic kidney disease                                                        
                                                                                            
                    |
|   Other disorders of plasma-protein metabolism, not elsewhere classified                  
                                                                                            
                    |
|   Moderate protein-calorie malnutrition                                                   
                                                                                            
                    |
|   Other disorders of phosphorus metabolism                                                
                                                                                            
                    |
|                                                                                           
                                                                                            
                    |
|2018 [facility]. [place] [facility]                                
                                                                                            
                    |
|  1. Type 2 diabetes mellitus with other specified complication                            
 
                                                                                            
                    |
|   2. Urinary tract infection, site not specified                                          
                                                                                            
                    |
|   3. Unspecified protein-calorie malnutrition                                             
                                                                                            
                    |
|   4. Other chronic osteomyelitis, unspecified site                                        
                                                                                            
                    |
|   5. 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
onic kidney disease, or end stage renal disease                                             
                    |
|   6. Chronic diastolic (congestive) heart failure                                         
                                                                                            
                    |
|   7. Other osteomyelitis, ankle and foot                                                  
                                                                                            
                    |
|   8. Type 2 diabetes mellitus with foot ulcer                                             
                                                                                            
                    |
|   9. Atherosclerotic heart disease of native coronary artery without angina pectoris      
                                                                                            
                    |
|   10. Chronic obstructive pulmonary disease, unspecified                                  
                                                                                            
                    |
|                                                                                           
                                                                                            
                    |
|2018 [facility]. [place] [facility]                                 
                                                                                            
                    |
|  1. Benign paroxysmal vertigo, unspecified ear                                            
                                                                                            
                    |
|   2. End stage renal disease                                                              
                                                                                            
                    |
|   3. Hypertensive chronic kidney disease with stage 5 chronic kidney disease or end stage 
renal disease                                                                               
                    |
|   4. Urinary tract infection, site not specified                                          
                                                                                            
                    |
|   5. 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
onic kidney disease, or end stage renal disease                                             
                    |
|   6. Kidney transplant status                                                             
                                                                                            
                    |
|   7. Unspecified systolic (congestive) heart failure                                      
                                                                                            
                    |
|   8. Syncope and collapse                                                                 
                                                                                            
                    |
|   9. Type 2 diabetes mellitus with diabetic chronic kidney disease                        
 
                                                                                            
                    |
|   10. Atherosclerotic heart disease of native coronary artery without angina pectoris     
                                                                                            
                    |
|                                                                                           
                                                                                            
                    |
|Sep 15, [address] [place]. [place] [facility]                           
                                                                                            
                    |
|  Acute ischemic heart disease, unspecified                                                
                                                                                            
                    |
|   Long term (current) use of insulin                                                      
                                                                                            
                    |
|   Dependence on renal dialysis                                                            
                                                                                            
                    |
|   End stage renal disease                                                                 
                                                                                            
                    |
|   Type 2 diabetes mellitus with diabetic chronic kidney disease                           
                                                                                            
                    |
|   Essential (primary) hypertension                                                        
                                                                                            
                    |
|   Anemia in chronic kidney disease                                                        
                                                                                            
                    |
|                                                                                           
                                                                                            
                    |
|2018 [facility] [place] [facility]                                
                                                                                            
                    |
|  0. Cough                                                                                 
                                                                                            
                    |
|   1. Pneumonia due to Pseudomonas                                                         
                                                                                            
                    |
|   2. End stage renal disease                                                              
                                                                                            
                    |
|   3. 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
onic kidney disease, or end stage renal disease                                             
                    |
|   4. Cachexia                                                                             
                                                                                            
                    |
|   5. Chronic obstructive pulmonary disease with acute lower respiratory infection         
                                                                                            
                    |
|   6. Type 2 diabetes mellitus with diabetic chronic kidney disease                        
                                                                                            
                    |
|   7. Heart failure, unspecified                                                           
 
                                                                                            
                    |
|   8. Hyperlipidemia, unspecified                                                          
                                                                                            
                    |
|   9. Gastro-esophageal reflux disease without esophagitis                                 
                                                                                            
                    |
|                                                                                           
                                                                                            
                    |
|2018 [facility] [place]. [place] [facility]                                 
                                                                                            
                    |
|  0. Other chest pain                                                                      
                                                                                            
                    |
|   1. Gastro-esophageal reflux disease without esophagitis                                 
                                                                                            
                    |
|   2. End stage renal disease                                                              
                                                                                            
                    |
|   3. 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
onic kidney disease, or end stage renal disease                                             
                    |
|   4. Chronic systolic (congestive) heart failure                                          
                                                                                            
                    |
|   5. 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
ctoris                                                                                      
                    |
|   6. Type 2 diabetes mellitus with diabetic chronic kidney disease                        
                                                                                            
                    |
|   7. Hyperlipidemia, unspecified                                                          
                                                                                            
                    |
|   8. Major depressive disorder, single episode, unspecified                               
                                                                                            
                    |
|   9. Chronic obstructive pulmonary disease, unspecified                                   
                                                                                            
                    |
|                                                                                           
                                                                                            
                    |
|Mar 6, 2018 [facility]                              
                                                                                            
                    |
|  Heart Failure                                                                            
                                                                                            
                    |
|   Need for iv access                                                                      
                                                                                            
                    |
|   Type 2 diabetes mellitus with other specified complication                              
                                                                                            
                    |
|   Long term (current) use of insulin                                                      
 
                                                                                            
                    |
|   Paroxysmal atrial fibrillation                                                          
                                                                                            
                    |
|   Anemia in chronic kidney disease                                                        
                                                                                            
                    |
|   Atherosclerotic heart disease of native coronary artery without angina pectoris         
                                                                                            
                    |
|   Cardiomyopathy, unspecified                                                             
                                                                                            
                    |
|   End stage renal disease                                                                 
                                                                                            
                    |
|   Other specified postprocedural states                                                   
                                                                                            
                    |
|                                                                                           
                                                                                            
                    |
|                                                                                           
                                                                                            
                    |
|                                                                                           
                                                                                            
                    |
|Care Providers                                                                             
                                                                                            
                    |
|Provider PRC Type Phone Fax Service Dates                                                  
                                                                                            
                    |
|HEADINGS, SALEEM HENDERSONN.P. Nurse Practitioner: Family   Current                                
                                                                                            
                    |
|Marco Antonio Martinez /Care Coordinator [phone]  2019 - Current         
                                                                                            
                    |
|[facility] [phone]  2019 - Current                          
                                                                                            
                    |
|[facility]  [phone] Current                   
                                                                                            
                    |
[facility]   Current                                
                                                                                            
                    |
|[facility]OLO GONZALEZ Primary Care   Current                                                        
                                                                                            
                    |
|[address] [employer] [facility] Provider [phone] Current                 
                                                                                            
                    |
|[facility] Case or Care Manager   Current                                                   
                                                                                            
                    |
|MIRTA Goel Case or Care Manager [phone] Current
 
                                                                                            
                    |
|Jairo Gutierrez MD Other   Current                                                       
                                                                                            
                    |
|                                                                                           
                                                                                            
                    |
|[employer]                                                                          
                                                                                            
                    |
|This patient has registered at the [facility] Emergency Department     
                                                                                            
                    |
|For more information visit: [email or web address]
[email or web address]                                                                               
                    |
|The above information is provided for the sole purpose of patient treatment. Use of this in
formation beyond the terms of Data Sharing Memorandum of Understanding and License Agreement
 is prohibited. In  |
|certain cases not all visits may be represented. Consult the aforementioned facilities for 
additional information.                                                                     
                    |
|2019 [employer] - [place], [place] - [email or web address]                                                                                       
                    |
+-------------------------------------------------------------------------------------------
--------------------------------------------------------------------------------------------
--------------------+
 
 
 
+-------------------+---------+--------------------+--------------+
| Performing        | Address | City/State/Zipcode | Phone Number |
| Organization      |         |                    |              |
+-------------------+---------+--------------------+--------------+
|   ED INFORMATION  |         |                    |              |
| EXCHANGE          |         |                    |              |
+-------------------+---------+--------------------+--------------+
 in this encounter
 
 Visit Diagnoses
 
 
+-------------------------------------------------------------------------------------+
| Diagnosis                                                                           |
+-------------------------------------------------------------------------------------+
|   Fall in home, initial encounter - Primary                                         |
+-------------------------------------------------------------------------------------+
|   Cellulitis of right toe                                                           |
+-------------------------------------------------------------------------------------+
|   Closed displaced fracture of distal phalanx of right great toe, initial encounter |
+-------------------------------------------------------------------------------------+
|   Generalized weakness                                                              |
+-------------------------------------------------------------------------------------+
|   Other malaise and fatigue                                                         |
+-------------------------------------------------------------------------------------+
|   Fatigue, unspecified type                                                         |
+-------------------------------------------------------------------------------------+
 
 
 
 
 Admitting Diagnoses
 
 
+-------------------------------------------------------------------------------------+
| Diagnosis                                                                           |
+-------------------------------------------------------------------------------------+
|   Generalized weakness                                                              |
+-------------------------------------------------------------------------------------+
|   Other malaise and fatigue                                                         |
+-------------------------------------------------------------------------------------+
|   Closed displaced fracture of distal phalanx of right great toe, initial encounter |
+-------------------------------------------------------------------------------------+
|   Fall in home, initial encounter                                                   |
+-------------------------------------------------------------------------------------+
|   Cellulitis of right toe                                                           |
+-------------------------------------------------------------------------------------+
|   Fatigue, unspecified type                                                         |
+-------------------------------------------------------------------------------------+
 
 
 
 Administered Medications
 
 
+------------------+--------+---------+------+------+------+
| Medication Order | MAR    | Action  | Dose | Rate | Site |
|                  | Action | Date    |      |      |      |
+------------------+--------+---------+------+------+------+
 
 
 
+-----------------------------------+---+
|   acetaminophen (TYLENOL)         |   |
| suppository 650 mg  650 mg,       |   |
| Rectal, Every 6 Hours PRN, Mild   |   |
| Pain (1-3), Fever, Starting Thu   |   |
| 19 at 2229                   |   |
+-----------------------------------+---+
|                                   |   |
+-----------------------------------+---+
|   acetaminophen (TYLENOL) tablet  |   |
| 650 mg  650 mg, Oral, Every 6     |   |
| Hours PRN, Mild Pain (1-3),       |   |
| Fever, Starting Thu 19 at    |   |
| 2229                              |   |
+-----------------------------------+---+
|                                   |   |
+-----------------------------------+---+
 
 
 
+-----------------------------------+-------+----------+--------+---+---+
|   apixaban (ELIQUIS) tablet 2.5   | Given |  | 2.5 mg |   |   |
| mg  2.5 mg, Oral, 2 Times Daily,  |       | 9 23:39  |        |   |   |
| First dose on Thu 19 at 2300 |       | PST      |        |   |   |
+-----------------------------------+-------+----------+--------+---+---+
 
 
 
 
+-------+----------+--------+---+---+
| Given | 2/15/201 | 2.5 mg |   |   |
|       | 9 09:40  |        |   |   |
|       | PST      |        |   |   |
+-------+----------+--------+---+---+
 
 
 
+---+---+
|   |   |
+---+---+
 
 
 
+-----------------------------------+-------+----------+-------+---+---+
|   atorvastatin (LIPITOR) tablet   | Given |  | 80 mg |   |   |
| 80 mg  80 mg, Oral, Nightly,      |       | 9 23:38  |       |   |   |
| First dose on Thu 19 at 2300 |       | PST      |       |   |   |
+-----------------------------------+-------+----------+-------+---+---+
 
 
 
+---+---+
|   |   |
+---+---+
 
 
 
+-----------------------------------+-------+----------+---------+---+---+
|   carvedilol (COREG) tablet 12.5  | Given |  | 12.5 mg |   |   |
| mg  12.5 mg, Oral, 2 Times Daily  |       | 9 23:38  |         |   |   |
| With Meals, First dose on Thu     |       | PST      |         |   |   |
| 19 at 2300                   |       |          |         |   |   |
+-----------------------------------+-------+----------+---------+---+---+
 
 
 
+-------+----------+---------+---+---+
| Given | 2/15/201 | 12.5 mg |   |   |
|       | 9 09:41  |         |   |   |
|       | PST      |         |   |   |
+-------+----------+---------+---+---+
 
 
 
+---+---+
|   |   |
+---+---+
 
 
 
+-----------------------------------+---------+----------+-----+-------+---+
|   cefTAZidime (FORTAZ) 2 g in     | New Bag |  | 2 g | 100   |   |
| sodium chloride (IV) 0.9 % 50 mL  |         | 9 18:05  |     | mL/hr |   |
| IVPB  2 g, Intravenous,           |         | PST      |     |       |   |
| Administer over 30 Minutes, Once, |         |          |     |       |   |
|  Thu 19 at 1700, For 1 dose  |         |          |     |       |   |
+-----------------------------------+---------+----------+-----+-------+---+
 
 
 
 
+-----------------------------------+---+
|                                   |   |
+-----------------------------------+---+
|   dextrose 10 % infusion  at      |   |
| 0-100 mL/hr, Intravenous,         |   |
| Continuous PRN, Hypoglycemia,     |   |
| Starting Thu 19 at , For |   |
|  BG 70 or less run infusion at    |   |
| 100 mL/ hr. Discontinue when BG   |   |
| increases to 100 mg/dl or greater |   |
|  x 2-3 hours and patient is       |   |
| eating. Call provider if BG not   |   |
| maintained after 2-3 hours.       |   |
+-----------------------------------+---+
|                                   |   |
+-----------------------------------+---+
|   dextrose 50 % solution 12 mL    |   |
| 12 mL, Intravenous, PRN,          |   |
| Hypoglycemia (BG < 70 mg/dL),     |   |
| Starting Thu 19 at       |   |
+-----------------------------------+---+
|                                   |   |
+-----------------------------------+---+
|   dextrose 50 % solution 25 mL    |   |
| 25 mL, Intravenous, PRN,          |   |
| Hypoglycemia (BG < 70 mg/dL),     |   |
| Starting Thu 19 at       |   |
+-----------------------------------+---+
|                                   |   |
+-----------------------------------+---+
|   glucagon (GLUCAGEN) injection   |   |
| 0.5 mg  0.5 mg, Intramuscular,    |   |
| PRN, Hypoglycemia, (BG < 70       |   |
| mg/dL), Starting Thu 19 at   |   |
|                               |   |
+-----------------------------------+---+
|                                   |   |
+-----------------------------------+---+
|   glucagon (GLUCAGEN) injection 1 |   |
|  mg  1 mg, Intramuscular, PRN,    |   |
| Hypoglycemia, (BG < 70 mg/dL),    |   |
| Starting u 19 at       |   |
+-----------------------------------+---+
|                                   |   |
+-----------------------------------+---+
 
 
 
+-----------------------------------+-------+----------+----------+---+---+
|   HYDROcodone-acetaminophen       | Given |  | 1 tablet |   |   |
| (NORCO) 5-325 MG per tablet 1     |       | 9 17:51  |          |   |   |
| tablet  1 tablet, Oral, Once, Thu |       | PST      |          |   |   |
|  19 at 1740, For 1 dose      |       |          |          |   |   |
+-----------------------------------+-------+----------+----------+---+---+
 
 
 
+---+---+
 
|   |   |
+---+---+
 
 
 
+-----------------------------------+-------+----------+----------+---+---+
|   HYDROcodone-acetaminophen       | Given |  | 1 tablet |   |   |
| (NORCO) 5-325 MG per tablet 1     |       | 9 23:39  |          |   |   |
| tablet  1 tablet, Oral, Every 4   |       | PST      |          |   |   |
| Hours PRN, Moderate Pain (4-6),   |       |          |          |   |   |
| Severe Pain (7-10), Starting Thu  |       |          |          |   |   |
| 19 at                    |       |          |          |   |   |
+-----------------------------------+-------+----------+----------+---+---+
 
 
 
+-------+----------+----------+---+---+
| Given | 2/15/201 | 1 tablet |   |   |
|       | 9 04:30  |          |   |   |
|       | PST      |          |   |   |
+-------+----------+----------+---+---+
| Given | 2/15/201 | 1 tablet |   |   |
|       | 9 09:39  |          |   |   |
|       | PST      |          |   |   |
+-------+----------+----------+---+---+
 
 
 
+---+---+
|   |   |
+---+---+
 
 
 
+-----------------------------------+-------+----------+----------+---+---+
|   insulin glargine (LANTUS)       | Given | 2/15/201 | 21 Units |   |   |
| injection 21 Units  21 Units,     |       | 9 00:29  |          |   |   |
| Subcutaneous, Nightly, First dose |       | PST      |          |   |   |
|  on Fri 2/15/19 at 0000           |       |          |          |   |   |
+-----------------------------------+-------+----------+----------+---+---+
 
 
 
+---+---+
|   |   |
+---+---+
 
 
 
+-----------------------------------+-------+----------+---------+---+---+
|   insulin lispro (human)          | Given |  | 3 Units |   |   |
| (HUMALOG) injection 0-10 Units    |       | 9 23:36  |         |   |   |
| 0-10 Units, Subcutaneous, 3 Times |       | PST      |         |   |   |
|  Daily Before Meals, First dose   |       |          |         |   |   |
| on 19 at 2300            |       |          |         |   |   |
+-----------------------------------+-------+----------+---------+---+---+
 
 
 
+-------+----------+---------+---+---+
 
| Given | 2/15/201 | 3 Units |   |   |
|       | 9 12:25  |         |   |   |
|       | PST      |         |   |   |
+-------+----------+---------+---+---+
 
 
 
+---+---+
|   |   |
+---+---+
 
 
 
+-----------------------------------+-------+----------+---------+---+---+
|   insulin lispro (human)          | Given |  | 1 Units |   |   |
| (HUMALOG) injection 0-5 Units     |       | 9 23:36  |         |   |   |
| 0-5 Units, Subcutaneous, Nightly, |       | PST      |         |   |   |
|  First dose on 19 at     |       |          |         |   |   |
| 2300                              |       |          |         |   |   |
+-----------------------------------+-------+----------+---------+---+---+
 
 
 
+---+---+
|   |   |
+---+---+
 
 
 
+-----------------------------------+-------+----------+-------+---+---+
|   isosorbide mononitrate 24 hr    | Given | 2/15/201 | 60 mg |   |   |
| tablet 60 mg  60 mg, Oral, Daily, |       | 9 09:40  |       |   |   |
|  First dose on Fri 2/15/19 at     |       | PST      |       |   |   |
| 0900                              |       |          |       |   |   |
+-----------------------------------+-------+----------+-------+---+---+
 
 
 
+---+---+
|   |   |
+---+---+
 
 
 
+-----------------------------------+-------+----------+------+---+---+
|   LORazepam (ATIVAN) tablet 1 mg  | Given | 2/15/201 | 1 mg |   |   |
|  1 mg, Oral, Every 8 Hours PRN,   |       | 9 13:33  |      |   |   |
| Anxiety, Starting Fri 2/15/19 at  |       | PST      |      |   |   |
| 1247                              |       |          |      |   |   |
+-----------------------------------+-------+----------+------+---+---+
 
 
 
+---+---+
|   |   |
+---+---+
 
 
 
+-----------------------------------+-------+----------+--------+---+---+
 
|   losartan (COZAAR) tablet 100 mg | Given | 2/15/201 | 100 mg |   |   |
|   100 mg, Oral, Daily, First dose |       | 9 09:41  |        |   |   |
|  on Fri 2/15/19 at 0900           |       | PST      |        |   |   |
+-----------------------------------+-------+----------+--------+---+---+
 
 
 
+---+---+
|   |   |
+---+---+
 
 
 
+-----------------------------------+-------+----------+-------+---+---+
|   nortriptyline (PAMELOR) capsule | Given | 2/15/201 | 25 mg |   |   |
|  25 mg  25 mg, Oral, Every        |       | 9 09:39  |       |   |   |
| Morning, First dose on Fri        |       | PST      |       |   |   |
| 2/15/19 at 0900                   |       |          |       |   |   |
+-----------------------------------+-------+----------+-------+---+---+
 
 
 
+---+---+
|   |   |
+---+---+
 
 
 
+-----------------------------------+-------+----------+-------+---+---+
|   nortriptyline (PAMELOR) capsule | Given | 2/15/201 | 75 mg |   |   |
|  75 mg  75 mg, Oral, Nightly,     |       | 9 00:30  |       |   |   |
| First dose on Fri 2/15/19 at 0000 |       | PST      |       |   |   |
+-----------------------------------+-------+----------+-------+---+---+
 
 
 
+-----------------------------------+---+
|                                   |   |
+-----------------------------------+---+
|   ondansetron (ZOFRAN) injection  |   |
| 4 mg  4 mg, Intravenous, Every 6  |   |
| Hours PRN, Nausea, Vomiting,      |   |
| Starting Thu 19 at 2229      |   |
+-----------------------------------+---+
|                                   |   |
+-----------------------------------+---+
|   ondansetron (ZOFRAN-ODT)        |   |
| disintegrating tablet 4 mg  4 mg, |   |
|  Oral, Every 6 Hours PRN, Nausea, |   |
|  Vomiting, Starting Thu 19   |   |
| at 2229                           |   |
+-----------------------------------+---+
|                                   |   |
+-----------------------------------+---+
 
 
 
+-----------------------------------+-------+----------+--------+---+---+
|   oxybutynin (DITROPAN) tablet    | Given |  | 7.5 mg |   |   |
| 7.5 mg  7.5 mg, Oral, 2 Times     |       | 9 23:38  |        |   |   |
 
| Daily, First dose on Thu 19  |       | PST      |        |   |   |
| at 2300                           |       |          |        |   |   |
+-----------------------------------+-------+----------+--------+---+---+
 
 
 
+-------+----------+--------+---+---+
| Given | 2/15/201 | 7.5 mg |   |   |
|       | 9 09:39  |        |   |   |
|       | PST      |        |   |   |
+-------+----------+--------+---+---+
 
 
 
+---+---+
|   |   |
+---+---+
 
 
 
+-----------------------------------+-------+----------+-------+---+---+
|   pantoprazole (PROTONIX) EC      | Given | 2/15/201 | 40 mg |   |   |
| tablet 40 mg  40 mg, Oral, Every  |       | 9 06:25  |       |   |   |
| Morning Before Breakfast, First   |       | PST      |       |   |   |
| dose on Fri 2/15/19 at 0630       |       |          |       |   |   |
+-----------------------------------+-------+----------+-------+---+---+
 
 
 
+---+---+
|   |   |
+---+---+
 
 
 
+-----------------------------------+-------+----------+---------+---+---+
|   rOPINIRole (REQUIP) tablet 0.75 | Given |  | 0.75 mg |   |   |
|  mg  0.75 mg, Oral, Nightly,      |       | 9 23:39  |         |   |   |
| First dose on u 19 at 2200 |       | PST      |         |   |   |
+-----------------------------------+-------+----------+---------+---+---+
 
 
 
+---+---+
|   |   |
+---+---+
 
 
 
+-----------------------------------+-------+----------+--------+---+---+
|   sodium chloride (PF) 0.9 %      | Given |  | 10 mLs |   |   |
| flush 10 mL  10 mL, Intravenous,  |       | 9 23:39  |        |   |   |
| Every 8 Hours, First dose on Thu  |       | PST      |        |   |   |
| 19 at 2300                   |       |          |        |   |   |
+-----------------------------------+-------+----------+--------+---+---+
 
 
 
+-------+----------+--------+---+---+
| Given | 2/15/201 | 10 mLs |   |   |
 
|       | 9 06:26  |        |   |   |
|       | PST      |        |   |   |
+-------+----------+--------+---+---+
 
 
 
+---+---+
|   |   |
+---+---+
 
 
 
+----------------------------------+---------+----------+----------+---+---+
|   vancomycin (VANCOCIN) 1500     | New Bag |  | 1,500 mg |   |   |
| mg/250 mL IVPB  1,500 mg,        |         | 9 18:41  |          |   |   |
| Intravenous, Administer over 90  |         | PST      |          |   |   |
| Minutes, Once, Thu 19 at    |         |          |          |   |   |
| 1700, For 1 dose                 |         |          |          |   |   |
+----------------------------------+---------+----------+----------+---+---+
 
 
 
+---+---+
|   |   |
+---+---+
 in this encounter

## 2019-04-03 NOTE — XMS
Encounter Summary
  Created on: 2019
 
 Cherie Villalobos
 External Reference #: [id number]
 : 48
 Sex: Female
 
 Demographics
 
 
+-----------------------+--------------------------+
| Address               | [address]               |
|                       | [place]  [address] |
+-----------------------+--------------------------+
| Home Phone            | [phone]          |
+-----------------------+--------------------------+
| Preferred Language    | Unknown                  |
+-----------------------+--------------------------+
| Marital Status        |                   |
+-----------------------+--------------------------+
| Islam Affiliation | 1013                     |
+-----------------------+--------------------------+
| Race                  | Unknown                  |
+-----------------------+--------------------------+
| Ethnic Group          | Unknown                  |
+-----------------------+--------------------------+
 
 
 Author
 
 
+--------------+--------------------------------------------+
| Author       | [facility]  |
|              | and [place]                                |
+--------------+--------------------------------------------+
| Organization | [facility]  |
|              | and [place]                                |
+--------------+--------------------------------------------+
| Address      | Unknown                                    |
+--------------+--------------------------------------------+
| Phone        | Unavailable                                |
+--------------+--------------------------------------------+
 
 
 
 Support
 
 
+---------------+--------------+---------------------+-----------------+
| Name          | Relationship | Address             | Phone           |
+---------------+--------------+---------------------+-----------------+
| Anoop Villalobos | ECON         | [address] [place], | [phone] |
|               |              |  [place]  [address]     |                 |
+---------------+--------------+---------------------+-----------------+
| Jennifer Dickson | ECON         | [place]       | [phone] |
|               |              | [phone]               |                 |
+---------------+--------------+---------------------+-----------------+
 
 
 
 
 Care Team Providers
 
 
+-----------------------+------+-------------+
| Care Team Member Name | Role | Phone       |
+-----------------------+------+-------------+
 PCP  | Unavailable |
+-----------------------+------+-------------+
 
 
 
 Reason for Visit
 
 
+---------------+----------+
| Reason        | Comments |
+---------------+----------+
| Device Check  | Billed   |
| (Remote)      |          |
+---------------+----------+
 
 
 
 Encounter Details
 
 
+--------+----------+----------------------+----------------------+----------------------+
| Date   | Type     | Department           | Care Team            | Description          |
+--------+----------+----------------------+----------------------+----------------------+
| / | Implant  |   [facility]          |   Johnie John, | Remote Device        |
| 2019   | Monitor  | CARDIOLOGY  [address]    |  MD  [address] [place] | Interrogation        |
|        |          | [place]  [place], |  [address]  [place],   | (Primary Dx); ICD    |
|        |          |  [place] [address]       | [place] [address]             | (implantable         |
|        |          | [phone]         | [phone]         | cardioverter-defibri |
|        |          |                      | [phone] (Fax)   | llator) [employer]       |
|        |          |                      |                      | Scientific  18   |
|        |          |                      |                      | Dora;           |
|        |          |                      |                      | Cardiomyopathy,      |
|        |          |                      |                      | unspecified type     |
|        |          |                      |                      | (HCC)                |
+--------+----------+----------------------+----------------------+----------------------+
 
 
 
 Social History
 
 
+---------------+------------+-----------+--------+------------------+
| Tobacco Use   | Types      | Packs/Day | Years  | Date             |
|               |            |           | Used   |                  |
+---------------+------------+-----------+--------+------------------+
| Former Smoker | Cigarettes | 1         | 30     | Quit: 2004 |
+---------------+------------+-----------+--------+------------------+
 
 
 
+---------------------+---+---+---+
 
| Smokeless Tobacco:  |   |   |   |
| Never Used          |   |   |   |
+---------------------+---+---+---+
 
 
 
+-------------+-----------+---------+----------+
| Alcohol Use | Drinks/We | oz/Week | Comments |
|             | ek        |         |          |
+-------------+-----------+---------+----------+
| No          |           |         |          |
+-------------+-----------+---------+----------+
 
 
 
+------------------+---------------+
| Sex Assigned at  | Date Recorded |
| Birth            |               |
+------------------+---------------+
| Not on file      |               |
+------------------+---------------+
 as of this encounter
 
 Functional Status
 
 
+---------------------------------------------+----------+--------------------+
| Functional Status                           | Response | Date of Assessment |
+---------------------------------------------+----------+--------------------+
| Are you deaf or do you have serious         | No       | 2018         |
| difficulty hearing?                         |          |                    |
+---------------------------------------------+----------+--------------------+
| Are you blind or do you have serious        | No       | 2018         |
| difficulty seeing, even when wearing        |          |                    |
| glasses?                                    |          |                    |
+---------------------------------------------+----------+--------------------+
| Do you have serious difficulty walking or   | No       | 2018         |
| climbing stairs? (5 years old or older)     |          |                    |
+---------------------------------------------+----------+--------------------+
| Do you have difficulty dressing or bathing? | No       | 2018         |
|  (5 years old or older)                     |          |                    |
+---------------------------------------------+----------+--------------------+
| Because of a physical, mental, or emotional | No       | 2018         |
|  condition, do you have difficulty doing    |          |                    |
| errands alone such as visiting a doctor's   |          |                    |
| office or shopping? [15 years old or        |          |                    |
| older)]                                     |          |                    |
+---------------------------------------------+----------+--------------------+
 
 
 
+---------------------------------------------+----------+--------------------+
| Cognitive Status                            | Response | Date of Assessment |
+---------------------------------------------+----------+--------------------+
| Because of a physical, mental, or emotional | No       | 2018         |
|  condition, do you have serious difficulty  |          |                    |
| concentrating, remembering, or making       |          |                    |
| decisions? (5 years old or older)           |          |                    |
+---------------------------------------------+----------+--------------------+
 as of this encounter
 
 
 Plan of Treatment
 
 
+--------+---------+------------+----------------------+-------------+
| Date   | Type    | Specialty  | Care Team            | Description |
+--------+---------+------------+----------------------+-------------+
| / | Office  | Cardiology |   Johnie John, |             |
|    | Visit   |            |  MD  [address] |             |
|        |         |            |  [place],   |             |
|        |         |            | [place] [address]             |             |
|        |         |            | [phone]         |             |
|        |         |            | [phone] (Fax)   |             |
+--------+---------+------------+----------------------+-------------+
 as of this encounter
 
 Procedures
 
 
+-----------------+--------+-------------+----------------------+----------------------+
| Procedure Name  | Priori | Date/Time   | Associated Diagnosis | Comments             |
|                 | ty     |             |                      |                      |
+-----------------+--------+-------------+----------------------+----------------------+
| DEVICE          | Routin | 2019  |   Remote Device      |   Results for this   |
| INTERROGATION-  | e      | 9 PDT    | Interrogation   ICD  | procedure are in the |
| REMOTE          |        |             | (implantable         |  results section.    |
|                 |        |             | cardioverter-defibri |                      |
|                 |        |             | llator) [employer]       |                      |
|                 |        |             | Scientific  18   |                      |
|                 |        |             | Dora            |                      |
|                 |        |             | Cardiomyopathy,      |                      |
|                 |        |             | unspecified type     |                      |
|                 |        |             | ([facility])                |                      |
+-----------------+--------+-------------+----------------------+----------------------+
 in this encounter
 
 Results
 Device Interrogation - Remote (2019 144)
 
+-----------------------------------------------------------------------+--------------+
| Narrative                                                             | Performed At |
+-----------------------------------------------------------------------+--------------+
|   This result has an attachment that is not available.  Johnie        |   [facility]    |
| MD [place]         2019 15:05Date of Remote Interrogation:    |              |
| 19 Refer to [facility] documentation and remote PDF scanned into    |              |
| EPIC for remote interrogation results.    Data collected by Clementina SALAZAR     |              |
| NAHUN Maynard Presenting rhythm:    sinus rhythm 59-60 beats.0           |              |
| ventricular high rate episodes. No tachycardia therapies recommended  |              |
| or delivered.Histogram    good.     Battery longevity                 |              |
| 12    years.Apparent normal and stable device function.Device         |              |
| interrogation due in office in 2019.                        |              |
|recommended or delivered.                                              |              |
|Histogram    good.     Battery longevity 12    years.                 |              |
|Apparent normal and stable device function.                            |              |
|Device interrogation due in office in 2019.                  |              |
|                                                                       |              |
+-----------------------------------------------------------------------+--------------+
 
 
 
 
+--------------+---------+--------------------+--------------+
| Performing   | Address | City/State/Zipcode | Phone Number |
| Organization |         |                    |              |
+--------------+---------+--------------------+--------------+
|   [facility]    |         |                    |              |
+--------------+---------+--------------------+--------------+
 in this encounter
 
 Visit Diagnoses
 
 
+------------------------------------------------------------------------------------+
| Diagnosis                                                                          |
+------------------------------------------------------------------------------------+
|   Remote Device Interrogation  - Primary                                           |
+------------------------------------------------------------------------------------+
|   Fitting and adjustment of automatic implantable cardiac defibrillator            |
+------------------------------------------------------------------------------------+
|   ICD (implantable cardioverter-defibrillator) [employer]  18 [place] |
+------------------------------------------------------------------------------------+
|   Cardiomyopathy, unspecified type (HCC)                                           |
+------------------------------------------------------------------------------------+

## 2019-04-03 NOTE — XMS
Encounter Summary
  Created on: 2019
 
 Cherie Villalobos
 External Reference #: [id number]
 : 48
 Sex: Female
 
 Demographics
 
 
+-----------------------+--------------------------+
| Address               | [address]               |
|                       | [place]  [address] |
+-----------------------+--------------------------+
| Home Phone            | [phone]          |
+-----------------------+--------------------------+
| Preferred Language    | Unknown                  |
+-----------------------+--------------------------+
| Marital Status        |                   |
+-----------------------+--------------------------+
| Yazidi Affiliation | [id number]                     |
+-----------------------+--------------------------+
| Race                  | Unknown                  |
+-----------------------+--------------------------+
| Ethnic Group          | Unknown                  |
+-----------------------+--------------------------+
 
 
 Author
 
 
+--------------+-----------------------+
| Author       | Sherry ReelBig |
+--------------+-----------------------+
| Organization | [facility] |
+--------------+-----------------------+
| Address      | Unknown               |
+--------------+-----------------------+
| Phone        | Unavailable           |
+--------------+-----------------------+
 
 
 
 Support
 
 
+---------------+--------------+---------------------+-----------------+
| Name          | Relationship | Address             | Phone           |
+---------------+--------------+---------------------+-----------------+
| Anoop Villalobos | ECON         | [place], | [phone] |
|               |              |  [place]  [address]     |                 |
+---------------+--------------+---------------------+-----------------+
| Jennifer Dickson | ECON         | [place]       | [phone] |
|               |              | [phone]               |                 |
+---------------+--------------+---------------------+-----------------+
 
 
 
 
 Care Team Providers
 
 
+-----------------------+------+-----------------+
| Care Team Member Name | Role | Phone           |
+-----------------------+------+-----------------+
| Ivy Couch DO      | PCP  | [phone] |
+-----------------------+------+-----------------+
 
 
 
 Reason for Visit
 
 
+-----------+------------------------------------------------------------------+
| Reason    | Comments                                                         |
+-----------+------------------------------------------------------------------+
| Foot Pain | possible infection in rt foot, was told to come in for a direct  |
|           | admit through the ER                                             |
+-----------+------------------------------------------------------------------+
 Auth/Cert
 
+--------+--------+-----------+--------------+--------------+---------------+
| Status | Reason | Specialty | Diagnoses /  | Referred By  | Referred To   |
|        |        |           | Procedures   | Contact      | Contact       |
+--------+--------+-----------+--------------+--------------+---------------+
|        |        | Internal  |   Diagnoses  |              |   [facility]    |
|        |        | Medicine  |  PVD         |              | [facility]   |
|        |        |           | (peripheral  |              | [address]
|        |        |           | vascular     |              |  [address]   |
|        |        |           | disease)     |              | [place], [place]  |
|        |        |           | (HCC)  ESRD  |              | [address]  Phone: |
|        |        |           | (end stage   |              |  [phone] |
|        |        |           | renal        |              |   Fax:        |
|        |        |           | disease) on  |              | [phone]  |
|        |        |           | dialysis     |              |               |
|        |        |           | (HCC)        |              |               |
+--------+--------+-----------+--------------+--------------+---------------+
 
 
 
 
 Encounter Details
 
 
+--------+-----------+----------------------+----------------------+----------------------+
| Date   | Type      | Department           | Care Team            | Description          |
+--------+-----------+----------------------+----------------------+----------------------+
| / | Hospital  |   [facility]    |   [facility],        | PVD (peripheral      |
| 2019 - | Encounter | [address]   | MD Maria Luisa Jarrett      | vascular disease)    |
|        |           | [facility] | [address]           | ([facility]) (Primary Dx);  |
| / |           |   [address]     | [place] [address]   | ESRD (end stage      |
| 2019   |           | [place] [address]   | [phone]         | renal disease) on    |
|        |           | [phone]         | [phone] (Fax)   | dialysis ([facility]);      |
|        |           |                      | Moiz Josue      | Anemia in ESRD       |
|        |           |                      | MD [address] | (end-stage renal     |
|        |           |                      |  Blvd  [place], [place]  | disease) ([facility]);      |
|        |           |                      | [address]  [phone]  | Hypoalbuminemia;     |
|        |           |                      |  [phone] (Fax)  | Electrolyte          |
 
|        |           |                      |                      | imbalance risk       |
+--------+-----------+----------------------+----------------------+----------------------+
 
 
 
 Social History
 
 
+---------------+------------+-----------+--------+------------------+
| Tobacco Use   | Types      | Packs/Day | Years  | Date             |
|               |            |           | Used   |                  |
+---------------+------------+-----------+--------+------------------+
| Former Smoker | Cigarettes | 1         | 30     | Quit: 2004 |
+---------------+------------+-----------+--------+------------------+
 
 
 
+---------------------+---+---+---+
| Smokeless Tobacco:  |   |   |   |
| Never Used          |   |   |   |
+---------------------+---+---+---+
 
 
 
+------------------------------+
| Comments: quite smoking  |
+------------------------------+
 
 
 
+-------------+-----------+---------+----------+
| Alcohol Use | Drinks/We | oz/Week | Comments |
|             | ek        |         |          |
+-------------+-----------+---------+----------+
| No          |           |         |          |
+-------------+-----------+---------+----------+
 
 
 
+------------------+---------------+
| Sex Assigned at  | Date Recorded |
| Birth            |               |
+------------------+---------------+
| Not on file      |               |
+------------------+---------------+
 as of this encounter
 
 Last Filed Vital Signs
 
 
+-------------------+----------------------+-------------------------+
| Vital Sign        | Reading              | Time Taken              |
+-------------------+----------------------+-------------------------+
| Blood Pressure    | 120/56               | 2019 11:40 AM PST |
+-------------------+----------------------+-------------------------+
| Pulse             | 64                   | 2019 11:40 AM PST |
+-------------------+----------------------+-------------------------+
| Temperature       | 36.4   C (97.5   F)  | 2019 11:40 AM PST |
+-------------------+----------------------+-------------------------+
| Respiratory Rate  | 18                   | 2019 11:40 AM PST |
 
+-------------------+----------------------+-------------------------+
| Oxygen Saturation | 97%                  | 2019 11:40 AM PST |
+-------------------+----------------------+-------------------------+
| Inhaled Oxygen    | -                    | -                       |
| Concentration     |                      |                         |
+-------------------+----------------------+-------------------------+
| Weight            | 65.5 kg (144 lb 6.4  | 2019  1:11 PM PST |
|                   | oz)                  |                         |
+-------------------+----------------------+-------------------------+
| Height            | 177.8 cm (5' 10")    | 2019  7:11 PM PST |
+-------------------+----------------------+-------------------------+
| Body Mass Index   | 20.72                | 2019  1:11 PM PST |
+-------------------+----------------------+-------------------------+
 in this encounter
 
 Discharge Summaries
 Prudence Nicholson MD-R2 - 2019  1:04 PM PSTFormatting of this note may be different fr
om the original.
 [facility]
 Service:  Hospitalist
 Resident Discharge Summary
 
 Date of Admission:  2019
 Date of Discharge:  
 
 Discharge Provider:  Prudence Nicholson MD-R2
 Treatment Team: 
 Consulting Physician: Srinivasan Jordan DPM
 Consulting Physician: Ethan Awad MD
 Admitting Provider: Luiza Rosales MD
 Discharge Diagnoses:   
 
 Principal Problem:
   PVD (peripheral vascular disease) ([facility])
 Active Problems:
   ESRD (end stage renal disease) (HCC)
   Type 2 diabetes mellitus with chronic kidney disease on chronic dialysis, with long-term 
current use of insulin (HCC)
   Anemia in ESRD (end-stage renal disease) (HCC)
   Hypertension, essential
   Chronic systolic congestive heart failure (HCC)
   COPD (chronic obstructive pulmonary disease) (HCC)
   Paroxysmal atrial fibrillation (HCC)
   CAD (coronary artery disease)
 Resolved Problems:
   * No resolved hospital problems. *
 
 BRIEF HISTORY OF PRESENTATION:  
      Patient Summary:  Per Dr. Rosales's H and P from 2019: '69 y/o F with h/o ESRD
 on HD T,,Sat (Dr. Asher), DM2, PAD s/p angioplasty of LLE and transmetatarsal amputation 
of left foot 18 by Dr. Jordan due to osteomyelitis, CAD, s/p MI, CABG, AVR , HFrE
F with last EF 20 to 25%, s/p AICD, AF on chronic anticoagulation who was sent to ED by Dr. Elliott for evaluation of right LE. Patient reports that at HD yesterday it was noted that he
r right toes were red and dusky. She went to see Dr. Elliott this morning and he was concerne
d that right toes could be ischemic or infected and referred to ED. '
 
 HOSPITAL COURSE:  
 Vascular surgery was consulted from the emergency department, they did a selective catheter
ization of the left common femoral artery and placed an SMA stent. The patient tolerated the
 procedure well. Podiatry was consulted, they recommended wound care consultation for the op
 
en wound on her left foot. They also stated that her right foot did not need a procedure for
 the toes at this time. Infectious disease is waiting for blood cultures and Gram stain and 
culture of the wound site to narrow her IV antibiotics. She reported improved pain, no short
ness of breath, no nausea and vomiting, she is making a small amount of urine and had a willie
l movement today. She would like to go home. Physical therapy cleared her to go home with [facility] assist. Nephrology was consulted and reported that they were amenable to administering he
r IV antibiotics with dialysis. Infectious disease was consulted and agreed to this plan. Up
on discharge the patient was eating, drinking, urinating, having bowel movements.she was not
 having fevers, nausea, or vomiting. 
 
 No prescriptions prior to admission. 
 
 DISCHARGE EXAM
 Vital Signs:
 /56 (BP Location: Right upper arm)  | Pulse 64  | Temp 97.5 F (36.4 C) (Axillary)
  | Resp 18  | Ht 1.778 m (5' 10")  | Wt 65.5 kg (144 lb 6.4 oz)  | SpO2 97%  | Breastfeedin
g? No  | BMI 20.72 kg/m 
 
 Physical Exam
 General Appearance: Alert and cooperative, sitting up in a chair by the bed and appears to 
be in no acute distress. 
 
 HEENNT: Normocephalic and atraumatic. Hearing grossly intact. No nasal discharge. No trache
al deviation. 
 
 Cardiovascular: Rhythm and rate are regular. 2/6 systolic murmur heard best over the left u
pper sternal border. No gallops or rubs appreciated. Peripheral pulses 2+ on the right. No l
ower extremity edema.
 
 Pulmonary/Chest: Lungs are clear to auscultation bilaterally. Effort is normal. Normal jadon
th sounds. 
 
 Abdominal: Soft, nontender, nondistended. Normoactive bowel sounds. No guarding or rebound 
tenderness. 
 
 Musculoskeletal: Normal range of motion. Normal muscular development. Patient with forefoot
 amputation on the left. Left foot wrapped in new dressing, C/D/I. Right foot with erythema 
over the great big toe and cyanosis over the second toe, improved from prior. 
 
 Neurological: CN II-XII grossly intact. Oriented to person, place, and time. 
 
 Skin: Skin is warm and dry. No rash noted. 
 
 Psychiatric:The patient was able to demonstrate good judgement and reason, without hallucin
ations, abnormal affect or abnormal behaviors during the examination. 
 
 DATA
 
 Recent Labs
 Lab 1938 19
 1543 
 WBC 3.90 3.39* 5.53 
 HGB 9.8* 9.5* 9.4* 
 HCT 30.1* 29.2* 28.4* 
 * 125* 147* 
 NEUTOPHILPCT  --  66.95  --  
 MONOPCT  --  11.96  --  
 
 
 Recent Labs
 Lab 19
 0538 19
 0453 19
 1543 
  140 139 
 K 3.9 4.2 4.0 
 CL 97* 101 100 
 CO2 31 28 29 
 BUN 13 26* 23 
 CREATININE 4.8* 6.8* 6.1* 
 PROT  --   --  6.2* 
 BILITOT  --   --  0.4 
 ALT  --   --  21 
 AST  --   --  24 
 
 Phosphorus:  
 Lab Results 
 Component Value Date 
  PHOS 3.6 2019 
 
 Invalid input(s): LABALBU
 
 Recent Labs
 Lab 19
 0538 
 MG 2.3 
 
 Recent Labs
 Lab 19
 1543 
 CKTOTAL 28* 
 
 Intake/Output Summary (Last 24 hours) at 19 1717
 Last data filed at 19 0850
  Gross per 24 hour 
 Intake              380 ml 
 Output                0 ml 
 Net              380 ml 
 
 Microbiology: Blood cultures - NGTD
 
 Radiology
 Us Lower Extremty  Arterial Right
 
 Result Date: 2019
 1. Right leg shows biphasic inflow with a greater than 50% stenosis at the origin of the dobbins
perficial femoral artery (137309).  Biphasic outflow. 2. Two vessel runoff to the right jeanne
t. Signed by: Eugenio Hoffman Sign Date/Time: 2019 3:11 PM
 
 PLAN
 
 Disposition:  Home
 Condition:  Stable
 Code Status:  Full Code
 
 No discharge procedures on file.
 
 Follow up:
 [facility]
 
 [address] [facility] [id number] [phone]
 Follow up in 3 week(s)
 
 Ivy Couch, DO
 [address], [address]
 [facility] [address]
 [phone]
 
 Ivy Couch, DO
 [address], [address]
 [facility] [address]
 [phone]
 
 In 1 week
 
 Andrés Elliott MD
 [address] [facility] [id number] [phone]
 
 Call office for appointment
 
  
 Medication List 
  
 START taking these medications  
 cefTAZidime 2 g injection
 QTY:  1 each
 Refills:  0
 Commonly known as:  FORTAZ
 Inject 2 g into the muscle After Hemodialysis (see admin instructions) for 7 days.
  
 vancomycin IVPB
 Refills:  0
 Commonly known as:  VANCOCIN
 Inject 750 mg into the vein After Hemodialysis (see admin instructions) for 7 days. Dilute 
in appropriate volume of IV fluid and give by slow IV infusion.
  
  
 CHANGE how you take these medications  
 losartan 25 MG tablet
 QTY:  30 tablet
 Refills:  0
 Commonly known as:  COZAAR
 Take 1 tablet by mouth daily.
 What changed:  how much to take
  
  
 CONTINUE taking these medications  
 * albuterol 108 (90 Base) MCG/ACT inhaler
 Refills:  0
 Commonly known as:  PROVENTIL HFA;VENTOLIN HFA
  
 * VENTOLIN  (90 Base) MCG/ACT inhaler
 Refills:  0
 Generic drug:  albuterol
  
 apixaban 2.5 MG tablet
 
 QTY:  60 tablet
 Refills:  0
 Commonly known as:  ELIQUIS
 Take 1 tablet by mouth 2 (two) times daily.
  
 atorvastatin 40 MG tablet
 QTY:  30 tablet
 Refills:  0
 Commonly known as:  LIPITOR
 Take 1 tablet by mouth nightly.
  
 bisacodyl 10 MG suppository
 Refills:  0
 Commonly known as:  DULCOLAX
  
 calcium acetate 667 MG capsule
 Refills:  0
 Commonly known as:  PHOSLO
  
 carvedilol 12.5 MG tablet
 QTY:  60 tablet
 Refills:  0
 Doctor's comments:  Hold for SBP less than 100
 Hold for HR less than 60
 Commonly known as:  COREG
 Take 1 tablet by mouth 2 (two) times daily with meals.
  
 clopidogrel 75 MG tablet
 QTY:  30 tablet
 Refills:  11
 Commonly known as:  PLAVIX
 Take 1 tablet by mouth daily.
  
 esomeprazole 20 MG capsule
 Refills:  0
 Commonly known as:  NEXIUM
  
 HYDROcodone-acetaminophen 5-325 MG per tablet
 QTY:  30 tablet
 Refills:  0
 Commonly known as:  NORCO
 Take 1 tablet by mouth every 4 (four) hours as needed.
  
 insulin aspart 100 UNIT/ML injection
 Refills:  0
 Commonly known as:  NOVOLOG
  
 insulin degludec 100 UNIT/ML injection
 Refills:  0
 Commonly known as:  TRESIBA
  
 isosorbide mononitrate 60 MG 24 hr tablet
 QTY:  30 tablet
 Refills:  1
 Take 1 tablet by mouth daily.
  
 loperamide 2 MG capsule
 Refills:  0
 Commonly known as:  IMODIUM
  
 
 LORazepam 1 MG tablet
 QTY:  30 tablet
 Refills:  0
 Commonly known as:  ATIVAN
 Take 1 tablet by mouth every 8 (eight) hours as needed for Anxiety.
  
 nitroGLYCERIN 0.4 MG SL tablet
 QTY:  30 tablet
 Refills:  0
 Doctor's comments:  Hold for SBP less than 100
 Commonly known as:  NITROSTAT
 Place 1 tablet under the tongue every 5 (five) minutes as needed for Chest pain.
  
 nortriptyline 25 MG capsule
 Refills:  0
 Commonly known as:  PAMELOR
  
 ondansetron 4 MG disintegrating tablet
 Refills:  0
 Commonly known as:  ZOFRAN-ODT
  
 oxybutynin 5 MG tablet
 Refills:  0
 Commonly known as:  DITROPAN
  
 prochlorperazine 5 MG tablet
 QTY:  60 tablet
 Refills:  11
 Doctor's comments:  Please consider 90 day supplies to promote better adherence
 TAKE ONE TABLET BY MOUTH TWICE DAILY AS NEEDED FOR NAUSEA
  
 ranitidine 150 MG tablet
 Refills:  0
 Commonly known as:  ZANTAC
  
 rOPINIRole 0.25 MG tablet
 Refills:  0
 Commonly known as:  REQUIP
  
 SLOW-MAG 71.5-119 MG Tbec
 Refills:  0
 Generic drug:  Magnesium Cl-Calcium Carbonate
  
 Vitamin D3 5000 units Caps
 Refills:  0
  
 Vortioxetine HBr 10 MG Tabs
 Refills:  0
  
  
 * This list has 2 medication(s) that are the same as other medications prescribed for you. 
Read the directions carefully, and ask your doctor or other care provider to review them wit
h you. 
  
  
  
 You might also be taking other medications not listed above. If you have questions about an
y of your other medications, talk to the person who prescribed them or your Primary Care Pro
vider. 
  
 
  
  
 STOP taking these medications  
 aspirin 81 MG EC tablet
  
  
  
 Where to Get Your Medications 
  
 Information about where to get these medications is not yet available  
 Ask your nurse or doctor about these medications
  cefTAZidime 2 g injection
  vancomycin IVPB
  
 
 Prudence Nicholson MD-R2
 2019
 5:17 PM
 
 
 Associated attestation - Moiz Josue MD - 2019  5:12 PM PSTPatient seen an
d examined along with residents prior to discharge. Discussed with Dr. Elliott and  Dr. Asher. 
Patient will receive IV antibiotics ceftazidime and vancomycin with each HD. She insists on 
going home and is cleared for discharge by Dr. Asher and Dr. Elliott. 
 I agree with the discharge summary of Dr. Nicholson. in this encounter
 
 Discharge Instructions
 Nesha Vanegas RN - 2019Formatting of this note may be different from the original.
 
 Peripheral Angiography
 Peripheral angiography is an outpatient procedure that makes a   map  of the vessels (ar
teries) in your lower body, legs, and arms, using X-ray and dye.This map can show where bloo
d flow may be blocked.
 Talk with your healthcare provider about the risks and complications of angiography. 
 Before the procedure
 Prepare for the peripheral angiography as follows:
  Tell your healthcare provider about all medicines you take and any allergies you may hav
e.
  Follow any directions you  re given for not eating or drinking before the procedure. If
 your provider says to take your normal medicines, swallow them with only small sips of wate
r.
  Arrange for a family member or friend to drive you home.
 During the procedure
 Here is what to expect:
  
 You may get medicine through an IV (intravenous) line to relax you. You  re given an injec
tion to numb the insertion site. Then, a tiny skin cut (incision) is made near an artery in 
your groin.
  Your provider inserts a thin tube (catheter) through the incision. He or she then thread
s the catheter into an artery while looking at a video monitor.
  Contrast   dye  is injected into the catheter to confirm position. You may feel warmt
h or pressure in your legs and back. You lie still as X-rays are taken. The catheter is then
 taken out.
 After the procedure
 You  ll be taken to a recovery area. A healthcare provider will apply pressure to the site
 for about 10 minutes. Your healthcare provider will tell you how long to lie down and keep 
the insertion site still.Your healthcare provider will discuss the results with you soon a
fter the procedure.
 Back at home
 On the day you get home, don  t drive, don  t exercise, avoid walking and taking stairs, 
 
and avoid bending and lifting. Your healthcare provider may give you other care instructions
.
 Call your healthcare provider
 Call your healthcare provider right away if:
  You notice a lump or bleeding at the insertion site
  You feel pain at the insertion site
  You become lightheaded or dizzy
  You have leg pain or numbness
  You do not urinate in 8 hours 
 Date Last Reviewed: 2016-2018 [employer] [address] [place] [address]. All righ
ts reserved. This information is not intended as a substitute for professional medical care.
 Always follow your healthcare professional's instructions.
 
 Peripheral Arterial Angioplasty and Stent
 
 Peripheral arterial angioplasty is a procedure done to treat a narrowed or blocked artery i
n your arm or leg. This brings blood flow back to your arm or leg. It also helps ease sympto
ms. Sometimes a metal mesh tube called a stent may be put in your artery. This holds your ar
dirk open. The procedure is done by a specially trained doctor called an interventional radi
ologist. This sylvia doctor trained and certified by the American Board of Radiology to use m
inimally invasive image-guided procedures to diagnose and treat diseases.
 Before your procedure
 Follow any instructions you are given on how to get ready. This can include following any d
irections you  re given for not eating or drinking before the procedure.
 Tell your healthcare provider if you:
  Are allergic to X-ray dye (contrast medium) or other medicines
  Are breastfeeding
  Are pregnant or think you may be pregnant
  Have had any recent illnesses
  Have any medical conditions
 Tell your healthcare provider about any medicines you are taking. You may need to stop taki
ng all or some of these before the procedure. This includes:
  All prescription medicines
  Herbs, vitamins, and other supplements
  Over-the-counter medicines such as aspirin or ibuprofen
  Street drugs
 During your procedure
  An IV line is put into your vein. This is to give you fluids and medicines. You may be g
iven medicine to help you relax and make you sleepy (sedation). Medicine will be put on your
 skin where the cut (incision) will be done. This is to keep you from feeling pain at the si
te.
  A very small incision is made at the insertion site. A thin, flexible tube (catheter) is
 put through the incision into your artery. The radiologist watches the catheter  s movemen
t on a screen.
  X-ray dye is injected through the catheter into your artery. This helps to see the arter
y more clearly on the X-rays. The radiologist uses these images as a guide. He or she moves 
the catheter to the narrowed or blocked part of your artery.
  When the catheter reaches the narrowed or blocked area, the radiologist inflates a speci
al balloon attached to the catheter. This is called angioplasty. Inflating the balloon widen
s the passage through the artery.
  Sometimes the artery won't stay open after the angioplasty. In this case, a stent is nee
ded. A catheter with a stent attached is threaded through the artery. When the stent is in t
he right place, it is opened.
  When the procedure is done, all catheters and balloons are removed. The stent stays in p
lace. Pressure is put on the insertion site for 15 minutes to stop bleeding.
 After your procedure
  Your healthcare provider will tell you how long to lie down and keep the insertion site 
still.
  You may stay in the hospital for a few hours or overnight.
 
  Drink plenty of fluids to help flush the X-ray dye from your body.
  After you go home, care for the insertion site as directed.
  You may need to take aspirin or a blood-thinning medicine (anticoagulant) after the proc
edure. This is to help prevent blood clots in the stent. Talk with your healthcare provider 
about this.
 Possible risks and complications
 All procedures have some risk. Possible risks of peripheral arterial angioplasty and stent 
include:
  Bleeding or infection at the catheter insertion site
  Damage to the artery, including worsening of the blockage
  Problems because of X-ray dye, these include allergic reaction or kidney damage
  Artery becomes blocked again (restenosis), which often happens within 6 to 18 months
  Low-level exposure to X-ray radiation, which is considered a safe level 
 Date Last Reviewed: 2017-2018 [employer] [address] [place] [address], [place] [id number]. All righ
ts reserved. This information is not intended as a substitute for professional medical care.
 Always follow your healthcare professional's instructions.
 
 I certify that Cherie Villalobos  is under my care and that I had a face to face encounter on 
19  that meets the physician face to face encounter requirements.  I certify that based
 on my findings , the patient is in need of intermittent skilled services and a plan for fur
nishing these services to include, but not limited to the services listed in the questions b
elow:  
 Primary diagnosis for home health: PVD: wound care/ foot infection
 Additional diagnosis: 
 Services needed: wound care for right and left foot
 Patient is homebound because he/she cannot leave home without assistance of another individ
ual and leaving the home requires a considerable and taxing effort. Patient needs the assist
ance of another individual to leave home because:  Limited mobiltiy, fall risk, pain with mo
[facility]
 Physician assuming care for Home Health services: Ivy Couch
 
 in this encounter
 
 Medications at Time of Discharge
 
 
+----------------------+----------------------+----------+---------+----------+-----------+
| Medication           | Sig.                 | Disp.    | Refills | Start    | End Date  |
|                      |                      |          |         | Date     |           |
+----------------------+----------------------+----------+---------+----------+-----------+
|   albuterol          | Inhale 2 puffs into  |          |         |          |           |
| (PROVENTIL           | the lungs every 4    |          |         |          |           |
| HFA;VENTOLIN HFA)    | (four) hours as      |          |         |          |           |
| 108 (90 Base)        | needed for Wheezing. |          |         |          |           |
| MCG/ACT              |                      |          |         |          |           |
| inhalerIndications:  |                      |          |         |          |           |
| states uses 2x       |                      |          |         |          |           |
| monthly average      |                      |          |         |          |           |
+----------------------+----------------------+----------+---------+----------+-----------+
|   apixaban (ELIQUIS) | Take 1 tablet by     |   60     | 0       | 20 |           |
|  2.5 MG tablet       | mouth 2 (two) times  | tablet   |         | 18       |           |
|                      | daily.               |          |         |          |           |
+----------------------+----------------------+----------+---------+----------+-----------+
|   carvedilol (COREG) | Take 1 tablet by     |   60     | 0       | 20 |  |
|  12.5 MG tablet      | mouth 2 (two) times  | tablet   |         | 19       | 0         |
|                      | daily with meals.    |          |         |          |           |
+----------------------+----------------------+----------+---------+----------+-----------+
|   esomeprazole       | Take 1 capsule by    |          |         |          |           |
| (NEXIUM) 40 MG       | mouth every day      |          |         |          |           |
 
| capsule              |                      |          |         |          |           |
+----------------------+----------------------+----------+---------+----------+-----------+
|                      | Take 1 tablet by     |   30     | 0       | 20 |           |
| HYDROcodone-acetamin | mouth every 4 (four) | tablet   |         | 19       |           |
| ophen (NORCO) 5-325  |  hours as needed.    |          |         |          |           |
| MG per tablet        |                      |          |         |          |           |
+----------------------+----------------------+----------+---------+----------+-----------+
|   insulin aspart     | Inject  into the     |          |         |          |           |
| (NOVOLOG) 100        | skin 3 (three) times |          |         |          |           |
| UNIT/ML injection    |  daily before meals. |          |         |          |           |
|                      |  Sliding scale       |          |         |          |           |
+----------------------+----------------------+----------+---------+----------+-----------+
|   insulin degludec   | Inject 21 units      |          |         |          |           |
| (TRESIBA) 100        | every night          |          |         |          |           |
| UNIT/ML injection    |                      |          |         |          |           |
+----------------------+----------------------+----------+---------+----------+-----------+
|   isosorbide         | Take 1 tablet by     |   30     | 1       | 20 |  |
| mononitrate (IMDUR)  | mouth daily.         | tablet   |         | 18       | 9         |
| 60 MG 24 hr tablet   |                      |          |         |          |           |
+----------------------+----------------------+----------+---------+----------+-----------+
|   losartan (COZAAR)  | Take 100 mg by mouth |          |         | 20 |           |
| 100 MG tablet        |  daily.              |          |         | 19       |           |
+----------------------+----------------------+----------+---------+----------+-----------+
|   nitroGLYCERIN      | Place 1 tablet under |   30     | 0       | 20 |  |
| (NITROSTAT) 0.4 MG   |  the tongue every 5  | tablet   |         | 19       | 0         |
| SL tablet            | (five) minutes as    |          |         |          |           |
|                      | needed for Chest     |          |         |          |           |
|                      | pain.                |          |         |          |           |
+----------------------+----------------------+----------+---------+----------+-----------+
|   nortriptyline      | Take 25-75 mg by     |          |         |          |           |
| (PAMELOR) 25 MG      | mouth See Admin      |          |         |          |           |
| capsule              | Instructions. Takes  |          |         |          |           |
|                      | 25 mg by mouth every |          |         |          |           |
|                      |  morning and 75 mg   |          |         |          |           |
|                      | every night          |          |         |          |           |
+----------------------+----------------------+----------+---------+----------+-----------+
|   ondansetron        | Take 4 mg by mouth   |          |         | 20 |           |
| (ZOFRAN-ODT) 4 MG    | every 8 (eight)      |          |         | 18       |           |
| disintegrating       | hours as needed.     |          |         |          |           |
| tablet               |                      |          |         |          |           |
+----------------------+----------------------+----------+---------+----------+-----------+
|   oxybutynin         | Take 7.5 mg by mouth |          |         |          |           |
| (DITROPAN) 5 MG      |  2 (two) times       |          |         |          |           |
| tablet               | daily.               |          |         |          |           |
+----------------------+----------------------+----------+---------+----------+-----------+
|   prochlorperazine 5 | TAKE ONE TABLET BY   |   60     | 11      | 20 |           |
|  MG tablet           | MOUTH TWICE DAILY AS | tablet   |         | 19       |           |
|                      |  NEEDED FOR NAUSEA   |          |         |          |           |
+----------------------+----------------------+----------+---------+----------+-----------+
|   rOPINIRole         | Take 0.75 mg by      |          |         |          |           |
| (REQUIP) 0.25 MG     | mouth nightly.       |          |         |          |           |
| tablet               |                      |          |         |          |           |
+----------------------+----------------------+----------+---------+----------+-----------+
|   TRINTELLIX 20 MG   | Take 20 mg by mouth  |          |         | 20 |           |
| TABS                 | every evening.       |          |         | 19       |           |
+----------------------+----------------------+----------+---------+----------+-----------+
|   cefTAZidime        | Inject 2 g into the  |   1 each |         | 20 |  |
| (FORTAZ) 2 g         | muscle After         |          |         | 19       | 9         |
| injection            | Hemodialysis (see    |          |         |          |           |
|                      | admin instructions)  |          |         |          |           |
 
|                      | for 7 days.          |          |         |          |           |
+----------------------+----------------------+----------+---------+----------+-----------+
|   vancomycin         | Inject 750 mg into   |          | 0       | 20 |  |
| (VANCOCIN) IVPB      | the vein After       |          |         | 19       | 9         |
|                      | Hemodialysis (see    |          |         |          |           |
|                      | admin instructions)  |          |         |          |           |
|                      | for 7 days. Dilute   |          |         |          |           |
|                      | in appropriate       |          |         |          |           |
|                      | volume of IV fluid   |          |         |          |           |
|                      | and give by slow IV  |          |         |          |           |
|                      | infusion.            |          |         |          |           |
+----------------------+----------------------+----------+---------+----------+-----------+
|   albuterol          | Ventolin HFA 90      |          |         |          |  |
| (VENTOLIN HFA) 108   | mcg/actuation        |          |         |          | 9         |
| (90 Base) MCG/ACT    | aerosol inhaler      |          |         |          |           |
| inhaler              | Inhale 2 puffs every |          |         |          |           |
|                      |  4 hours by          |          |         |          |           |
|                      | inhalation route as  |          |         |          |           |
|                      | needed.              |          |         |          |           |
+----------------------+----------------------+----------+---------+----------+-----------+
|   atorvastatin       | Take 1 tablet by     |   30     | 0       | 20 |  |
| (LIPITOR) 40 MG      | mouth nightly.       | tablet   |         | 19       | 9         |
| tablet               |                      |          |         |          |           |
+----------------------+----------------------+----------+---------+----------+-----------+
|   bisacodyl          | Place 10 mg          |          |         |          |  |
| (DULCOLAX) 10 MG     | rectally.            |          |         |          | 9         |
| suppository          |                      |          |         |          |           |
+----------------------+----------------------+----------+---------+----------+-----------+
|   calcium acetate    | 667 mg 3 (three)     |          |         | 20 |  |
| (PHOSLO) 667 MG      | times daily with     |          |         | 16       | 9         |
| capsule              | meals.               |          |         |          |           |
+----------------------+----------------------+----------+---------+----------+-----------+
|   Cholecalciferol    | Take 5,000 capsules  |          |         |          |  |
| (VITAMIN D3) 5000    | by mouth every other |          |         |          | 9         |
| UNITS CAPS           |  day.                |          |         |          |           |
+----------------------+----------------------+----------+---------+----------+-----------+
|   clopidogrel        | Take 1 tablet by     |   30     | 11      | 20 |  |
| (PLAVIX) 75 MG       | mouth daily.         | tablet   |         | 18       | 9         |
| tablet               |                      |          |         |          |           |
+----------------------+----------------------+----------+---------+----------+-----------+
|   loperamide         | 2 mg as needed.      |          |         |          |  |
| (IMODIUM) 2 MG       |                      |          |         |          | 9         |
| capsule              |                      |          |         |          |           |
+----------------------+----------------------+----------+---------+----------+-----------+
|   LORazepam (ATIVAN) | Take 1 tablet by     |   30     | 0       | 20 | 02/15/201 |
|  1 MG tablet         | mouth every 8        | tablet   |         | 19       | 9         |
|                      | (eight) hours as     |          |         |          |           |
|                      | needed for Anxiety.  |          |         |          |           |
+----------------------+----------------------+----------+---------+----------+-----------+
|   Magnesium          | Take 1 tablet by     |          |         |          |  |
| Cl-Calcium Carbonate | mouth every other    |          |         |          | 9         |
|  (SLOW-MAG) 71.5-119 | day.                 |          |         |          |           |
|  MG TBEC             |                      |          |         |          |           |
+----------------------+----------------------+----------+---------+----------+-----------+
|   ranitidine         | ranitidine 150 mg    |          |         |          |  |
| (ZANTAC) 150 MG      | tablet Take 1 tablet |          |         |          | 9         |
| tablet               |  twice a day by oral |          |         |          |           |
|                      |  route.              |          |         |          |           |
+----------------------+----------------------+----------+---------+----------+-----------+
|   Vortioxetine HBr   | 10 mg nightly.       |          |         |          |  |
 
| 10 MG TABS           |                      |          |         |          | 9         |
+----------------------+----------------------+----------+---------+----------+-----------+
 as of this encounter
 
 Progress Notes
 Andrés Elliott MD - 2019  7:16 AM PSTFormatting of this note may be different from the 
original.
 
 
 CONSULT NOTE
 2019
 7:16 AM
 
 PRIMARY PHYSICIAN
 Ivy Couch
 
 CONSULT REQUEST FROM
 Moiz Josue MD
 
 HISTORY OF PRESENT ILLNESS
 The patient is a 68 y.o.-year-old female  with history of ESRD on HD via fistula Tue/Thu/Sa
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
 CABG. Recent LLE angioplasty She had been on antibiotics for L foot first digit infection w
ith possible osteomyelitis since November (Oral levofloxacin and clindamycin then transition
ed to IV cefepime with HD until 18, no improvement noted on antibiotics). She had follo
wed with podiatrist and underwent L TMA on 18, intraoperative findings encountered pur
ulence at first metatarsal.
 
 She was Admitted on 18 to [facility] and had resection of the toe.  discha
rged on 19She has problem with tremors on the hands And on and off confusion. Ct head ne
[place] on 18 EEG done show no seizure just diffuse slowing .
 The patient has some nausea and on and off chest pain stable. The patient felt better was
 discharged on 2019 
 
 readmitted on 19 and discharged on 19 due to chest pain and noted to be stable.
 
 
 Wound culture shows skin sherwin.She had 20days off iv vancomycin and 16 days of ceftazidime 
Had 4 days of cefepime.Orignally was to complete to iv vancmycin 500 mg iv and ceftazidime 2
 gm iv after each hemodialysis unitluntil 18 for the infected foot. Tbut was stopped as
 the line of resection was free of infecton . And off antibiotics since 19
 
 
 The patient developed new right foot lesion which occurred between  until 2019. Patient has memory problems does not know what happened but the right foot is swo
llen and red and tender will need to be taken care of as soon as possible cultures will be d
one. We will have MRSA screen CBC CMP ESR CRP blood culture  2
 
 cultures of the right foot done and right ankle done
 
 will have patient go to [facility] ER for evaluation of ischemic right foot versus severe infec
tion of the right fourth toe.
 
 Wound examination right leg with 21 x 5 cm abrasions ankle with open wound big toe right fo
ot with circumference of 10 cm with blister of 0.5 x 0.2 cm with redness of 5 cm. Second toe
 of the right foot with pallor of 4 x 2 cm.Left foot with amputation site healing well 11 x 
1 cm
 
 I have spoken to Dr. Srinivasan Jordan podiatrist will see her when the patient is admitted to 
[facility].
 
 
 Patient will go to the emergency room to see Dr. Saenz whom have coordinated her to be see
n there will have arterial studies of the right leg is concern for ischemic will be on vanco
mycin, ceftazidme
 
 And metronidazole. 
 ADmitted to [facility] on 19
 Due to concern with Right footinfection.
 infection, thus an infectious disease (ID) consult done for further
 evaluation and management.
 Day 3 of vancomycin, metronidazole and ceftazidime
 S/p  Ultrasound guided access of left common femoral artery
 Aortogram SElective catheterization of right common femoral artery with right leg runoff.Ri
ght SFA stenting using 6x80 mm self expanding stent
 Drug eluting balloon angioplasty of right SFA using 4x100 mm Lutonix balloon
 The patient feeling better today 
 Pending culture no growth will complete one more week o
 Ceftazidime and vancomycin 
 wound care with silver sulfadiazine cream. 
 Past Medical History 
 Diagnosis Date 
   Acute MI ([facility])  
  with stenting x 1 
   Anemia associated with chronic renal failure 2016 
   Atrial fibrillation ([facility])  
   Chronic kidney disease  
   Congestive heart failure ([facility])  
   COPD (chronic obstructive pulmonary disease) ([facility])  
   COPD (chronic obstructive pulmonary disease) ([facility]) 2018 
   Coronary artery disease  
  stent placements: 3 total 
   Depression  
   Diabetes other 
  abstracted 
   Diabetes mellitus, type 2 ([facility])  
   ESRD on peritoneal dialysis ([facility])  
   GERD (gastroesophageal reflux disease)  
   Hemodialysis patient ([facility]) 10/2016 
  Stopped peritoneal and restarted hemodialysis 
   Hemorrhage of gastrointestinal tract, unspecified 2017 
  low GI, rectal bleeding 
   High blood pressure other 
  abstracted 
   High cholesterol other 
  abstracted 
   Hyperlipidemia  
   Hypertension  
   Hyponatremia 2017 
   Myocardial infarction ([facility]) 2017 
   Other chronic pain  
  feet,  
   Pain of left hip joint 2016 
  due to hip fracture and replacement 
   Partial small bowel obstruction ([facility]) 2017 
  resolved 
   Renal failure  
  Stage 5.   Fistula in the JAN - not on dialysis yet. 
   Unspecified visual disturbance  
  glasses 
 
 
 Past Surgical History 
 Procedure Laterality Date 
   AV FISTULA PLACEMENT   
  left upper arm AV fistula - failed 
   AV FISTULA REPAIR N/A 10/25/2016 
  Procedure: AV FISTULA - GRAFT REPAIR/REVISION;  Surgeon: Kirt Mclaughlin MD;  Location: [facility]
IN OR;  Service: Vascular;  Laterality: N/A;  Superficialization and revision of left arm fi
stula 
   CARDIAC CATHETERIZATION  2017 
   CARPAL TUNNEL RELEASE Bilateral other 
  abstracted 
   CATARACT EXTRACTION Bilateral  
   CATHETER REMOVAL N/A 2016 
  Procedure: DIALYSIS CATHETER - REMOVAL;  Surgeon: Kirt Mclaughlin MD;  Location: [facility] OR; 
 Service: Vascular;  Laterality: N/A;  PD cath removal 
   CHOLECYSTECTOMY  other 
  abstracted 
   COLONOSCOPY   
   CORONARY ARTERY BYPASS GRAFT   
   CORONARY STENT PLACEMENT  2017 
  Drug Elutin stents to OM, 1 stent to prox. LAD 
   EYE SURGERY   
   FOOT AMPUTATION Left 2018 
  Procedure: FOREFOOT - AMPUTATION;  Surgeon: Srinivasan Jordan DPM;  Location: [facility] MAIN OR;
  Service: Podiatry;  Laterality: Left; 
   HARDWARE PRESENT   
  stent placements x 3, Left hip replacement, vascular stents 2 in left leg 
   HIP ARTHROPLASTY Left 2016 
  Procedure: HIP - ARTHROPLASTY(ALLISON);  Surgeon: Uriel Roa MD;  Location: [facility] MAIN 
OR;  Service: Orthopedics;  Laterality: Left; 
   HYSTERECTOMY  1998 
  complete 
   PACEMAKER INSERTION   
  [employer] pacemaker/defib 
   PERITONEAL CATHETER INSERTION  8/15/2013 
   PERITONEAL CATHETER INSERTION N/A 2016 
  Procedure: LAPAROSCOPIC - PERITONEAL DIALYSIS CATH INSERTION;  Surgeon: Kirt Mclaughlin MD;  Lo
cation: [facility] MAIN OR;  Service: Vascular;  Laterality: N/A;  Removal of old catheter 
   SHOULDER SURGERY Right  
  frozen shoulder 
   UNLISTED PROCEDURE ARTHROSCOPY   
  total Left hip 
   vascular stents Left 2017 
  leg (2 stents) 
   VASCULAR SURGERY   
 
 Current Facility-Administered Medications 
 Medication Dose Route Frequency Provider Last Rate Last Dose 
   acetaminophen (TYLENOL) tablet 650 mg  650 mg Oral Q6H PRN Luiza Rosales MD     
  Or 
   acetaminophen (TYLENOL) suppository 650 mg  650 mg Rectal Q6H PRN Luiza Rosales MD
     
   albuterol (PROVENTIL HFA;VENTOLIN HFA) inhaler  2 puff Inhalation Q4H PRN Luiza jhaveri MD     
   apixaban (ELIQUIS) tablet 2.5 mg  2.5 mg Oral BID Prudence Nicholson MD-R2   2.5 mg at 2051 
   aspirin EC tablet 81 mg  81 mg Oral Daily with breakfast Kirt Mclaughlin MD   81 mg at  1400 
   atorvastatin (LIPITOR) tablet 40 mg  40 mg Oral Nightly Luiza Rosales MD   40 mg a
t 19 
 
   calcium acetate (PHOSLO) capsule 667 mg  667 mg Oral TID  Luiza Rosales MD   667
 mg at 19 
   carvedilol (COREG) tablet 12.5 mg  12.5 mg Oral BID KAYLEE Rosales MD   12.5 mg 
at 19 
   cefTAZidime (FORTAZ) 2 g in sodium chloride (IV) 0.9 % 50 mL IVPB  2 g Intravenous Afte
r Hemodialysis (see admin instructions) Andrés Elliott MD     
   cholecalciferol (VITAMIN D-3) tablet 5,000 Units  5,000 Units Oral Daily Luiza rivas MD   5,000 Units at 19 1400 
   clopidogrel (PLAVIX) tablet 75 mg  75 mg Oral Daily Kirt Mclaughlin MD   75 mg at 19 1
401 
   dextrose 10 % infusion   Intravenous Continuous PRN Luiza Rosales MD     
   dextrose 50 % solution 12 mL  12 mL Intravenous PRN Luiza Rosales MD     
   dextrose 50 % solution 25 mL  25 mL Intravenous PRN Luiza Rosales MD     
   famotidine (PEPCID) tablet 10 mg  10 mg Oral Daily Luiza Rosales MD   10 mg at  1401 
   fentaNYL (SUBLIMAZE) injection 25 mcg  25 mcg Intravenous Q1H PRN Kirt Mclaughlin MD     
  Or 
   fentaNYL (SUBLIMAZE) injection 50 mcg  50 mcg Intravenous Q1H PRN Kirt Mclaughlin MD     
   glucagon (GLUCAGEN) injection 0.5 mg  0.5 mg Intramuscular PRN Luiza Rosales MD   
  
   glucagon (GLUCAGEN) injection 1 mg  1 mg Intramuscular PRN Luiza Rosales MD     
   HYDROcodone-acetaminophen (NORCO) 5-325 MG per tablet 1 tablet  1 tablet Oral Q4H PRN STEVEN Mclaughlin MD   1 tablet at 19 0626 
  Or 
   HYDROcodone-acetaminophen (NORCO)  MG per tablet 1 tablet  1 tablet Oral Q4H PRN 
Kirt Mclaughlin MD   1 tablet at 19 
   HYDROmorphone (DILAUDID) injection 0.5 mg  0.5 mg Intravenous Q3H PRN Luiza Rosales MD     
  Or 
   HYDROmorphone (DILAUDID) injection 1 mg  1 mg Intravenous Q3H PRN Luiza Rosales MD
     
   insulin glargine (LANTUS) injection 15 Units  15 Units Subcutaneous Nightly Luiza doran MD   15 Units at 19 
   insulin lispro (human) (HUMALOG) injection 0-3 Units  0-3 Units Subcutaneous Nightly 
leonel Rosales MD   1 Units at 19 
   insulin lispro (human) (HUMALOG) injection 0-6 Units  0-6 Units Subcutaneous TID AC She
kelley Rosales MD   1 Units at 19 171 
   isosorbide mononitrate (IMDUR) 24 hr tablet 60 mg  60 mg Oral Daily Luiza Rosales MD   60 mg at 19 1402 
   loperamide (IMODIUM) capsule 2 mg  2 mg Oral 4x Daily PRN Luiza Rosales MD     
   LORazepam (ATIVAN) tablet 1 mg  1 mg Oral Q8H PRN Luiza Rosales MD   1 mg at  0839 
   metroNIDAZOLE (FLAGYL) tablet 500 mg  500 mg Oral 3 times per day Andrés Elliott MD   500 
mg at 19 0557 
   nitroGLYCERIN (NITROSTAT) SL tablet 0.4 mg  0.4 mg Sublingual Q5 Min PRN Luiza rivas MD     
   nortriptyline (PAMELOR) capsule 25 mg  25 mg Oral QAM Kirt Mclaughlin MD   Stopped at  1007 
  And 
   nortriptyline (PAMELOR) capsule 75 mg  75 mg Oral Nightly Kirt Mclaughlin MD   75 mg at  
   ondansetron (ZOFRAN-ODT) disintegrating tablet 4 mg  4 mg Oral Q6H PRN Kirt Mclaughlin MD   
  
  Or 
   ondansetron (ZOFRAN) injection 4 mg  4 mg Intravenous Q6H PRN Kirt Mclaughlin MD   4 mg at 0
19 223 
   oxybutynin (DITROPAN) tablet 2.5 mg  2.5 mg Oral BID Luiza Rosales MD   2.5 mg at 
19 
   pantoprazole (PROTONIX) EC tablet 40 mg  40 mg Oral QAM AC Kirt Mclaughlin MD   40 mg at  0557 
 
   rOPINIRole (REQUIP) tablet 0.75 mg  0.75 mg Oral Nightly Luiza Rosales MD   0.75 m
g at 19 
   sodium chloride (PF) 0.9 % flush 10 mL  10 mL Intravenous Q8H Luiza Rosales MD   1
0 mL at 19 1130 
   vancomycin (VANCOCIN) 500 mg in sodium chloride (IV) 0.9 % 100 mL IVPB  500 mg Intraven
ous See Admin Instructions Beatriz Luciano, [facility]     
   Vortioxetine HBr TABS 10 mg  10 mg Oral Nightly Luiza Rosales MD   Stopped at 01/2
4/19 2200 
   zolpidem (AMBIEN) tablet 5 mg  5 mg Oral Nightly PRN Kirt Mclaughlin MD     
 
 Allergies 
 Allergen Reactions 
   Quinapril Hcl Anaphylaxis 
   Digoxin And Related Other (See Comments) 
   toxic 
   Metaxalone Other (See Comments) 
   Morphine Mental Changes 
   Make her crazy/ "funny feeling in my head"
 Has used hydromorphone in-house 
   Pantoprazole Sodium Nausea and Vomiting 
   Penicillins Rash 
   Quinapril Hcl Edema 
   Facial Edema 
   Sudafed [Pseudoephedrine Hcl] Rash 
 
 Social History 
 
 Social History 
   Marital status:  
   Spouse name: N/A 
   Number of children: N/A 
   Years of education: N/A 
 
 Occupational History 
   Not on file. 
 
 Social History Main Topics 
   Smoking status: Former Smoker 
   Packs/day: 1.00 
   Years: 30.00 
   Types: Cigarettes 
   Quit date: 2004 
   Smokeless tobacco: Never Used 
    Comment: quite smoking  
   Alcohol use No 
   Drug use: No 
   Sexual activity: No 
 
 Other Topics Concern 
   Not on file 
 
 Social History Narrative 
  She lives with . 2 kids. Worked in banking 
 
 No smoking. No alcohol intake. Recreational Drug Use
 No Travel
 NO Pets
 Immunization History 
 Administered Date(s) Administered 
   Hepatitis B Adult 2016 
 
   Influenza, Trivalent W/Preservative 2016 
   Pneumococcal Polysaccharide 23-valent 2012 
  
 
 Family History 
 Problem Relation Age of Onset 
   High cholesterol Father  
   Hypertension Father  
   Diabetes Paternal Grandmother  
   Malig hypertherm Neg Hx  
   Kidney disease Neg Hx  
 
 REVIEW OF SYSTEMS
 
 General: The patient noted to have no problem of fever,no weight loss
  and no chills.
 
 Neurologic: Denies any headache, any lightheadedness. No loss of
 
 consciousness or seizure.
 
 Cardiac: Denies Chest Pain, Palpitation, Dyspnea on Exertion
 
 Pulmonary: Denies cough, wheezing and hemoptysis
 
 GASTROINTESTINAL: Denies nausea vomiting, and diarrhea.
 GENITOURINARY: Noted no dysuria, urgency, or frequency.
 Hematological : Denies any ecchymosis, bleeding or hematuria
 
 Endocrine: Denies any polyphagia, polyuria or hot and cold intolerance
  Musculoskeletal: no new joint pain and swelling. 
 Skin : Denies any new rashes or cutaneous changes.
 
 Rest of the review of systems is unremarkable.
 
 PHYSICAL EXAMINATION
 
 VITAL SIGNS 
 Wt Readings from Last 3 Encounters: 
 19 65.5 kg (144 lb 6.4 oz) 
 19 65.5 kg (144 lb 6.4 oz) 
 18 68.7 kg (151 lb 7.3 oz) 
 
 Temp Readings from Last 3 Encounters: 
 19 98.2 F (36.8 C) (Oral) 
 19 97.7 F (36.5 C) (Axillary) 
 19 98.3 F (36.8 C) (Oral) 
 
 BP Readings from Last 3 Encounters: 
 19 105/58 
 19 108/56 
 01/10/19 115/54 
 
 Pulse Readings from Last 3 Encounters: 
 19 56 
 19 68 
 01/10/19 59 
 
 Head:  Normocephalic atraumatic
 
 
 HEENT: Pink palpebral conjunctivae. Anicteric sclerae. No
 tonsillopharyngeal congestion.
 
 Neck : Noted no  Cervical Lymphadenopathy
 
 CHEST:Clear Breath Sounds Bilateral 
 
 HEART: Regular rate and rhythm. No murmurs thrills or rubs
 
 ABDOMEN: Soft, flat. No tenderness. No hepatosplenomegaly.
 
 GROIN AREA: Negative  for intertrigo.
 
 EXTREMITIES: upper extremity no edema
 Left foot with dressing
 Right foot with wounds less pale and red.
  
 
 NEUROLOGIC: No localizing signs.
 
 Skin : with  ecchymosis. 
 
 LABORATORY DATA
 
 Lab Results 
 Component Value Date 
  WBC 3.90 2019 
  HGB 9.8 (L) 2019 
  HCT 30.1 (L) 2019 
   (L) 2019 
  CHOL 101 2017 
  TRIG 132 2017 
  HDL 34 (L) 2017 
  ALT 21 2019 
  AST 24 2019 
   2019 
  K 3.9 2019 
  CL 97 (L) 2019 
  CREATININE 4.8 (H) 2019 
  BUN 13 2019 
  CO2 31 2019 
  TSH 1.14 2017 
  INR 1.0 2018 
  GLUF 169 (H) 2019 
  HGBA1C 7.5 (H) 2018 
 
 Hepatic Function Panel:  
 Lab Results 
 Component Value Date 
  PROT 6.2 (L) 2019 
  ALB 2.7 (L) 2019 
  BILITOT 0.4 2019 
  BILIDIR 0.1 2017 
   2019 
  AST 24 2019 
  ALT 21 2019 
  
 
 Lipase: 
 Lab Results 
 
 Component Value Date 
  LIPASE 332 2017 
  
 U/A:  
 Lab Results 
 Component Value Date 
  COLORU YELLOW 2011 
  CLARITYU Slightly Cloudy 10/16/2018 
  MEROPENEM 1.009 2013 
  LEUKOCYTESUR  10/16/2018 
    Comment: 
    small 
  NITRITE Negative 10/16/2018 
  UROBILINOGEN Normal 10/16/2018 
  UPRO  10/16/2018 
    Comment: 
    100 
  UPRO 100 2013 
  PHUR 8 10/16/2018 
  BLOODU Negative 10/16/2018 
  KETONES negative 10/16/2018 
  BILIRUBINUR Negative 10/16/2018 
  GLUCOSEU  10/16/2018 
    Comment: 
    moderate 
  
 DIAGNOSTIC IMAGING
 
 CAT scan 
 
 Ultrasound
   
 X-ray Foot Left
 
 Result Date: 2018
 Amputation of the left forefoot at the level of the proximal metatarsals. Surgical cutaneou
s staples are present. No radiographic evidence for osteomyelitis. Normal alignment of the h
indfoot. Mild degeneration of the midfoot. Electronically signed by Oleksandr Lemus MD on 2018 10:12 AM
 
 Ct Head Without Contrast
 
 Result Date: 2018
 1.  No acute intracranial pathology. Electronically signed by Steven Samano MD on  5:28 PM
 
 X-ray Chest 1 View
 
 Result Date: 2019
 1.  Small loculated pleural fluid collection seen overlying the lateral left heart border i
n the lower left chest.  There may also be a small pleural effusion at the right lung base w
hich is layering posteriorly. 2.  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 Electronically s
igned by Eugenio Hoffman DO on 2019 4:59 PM
 
 Us Lower Extremty  Arterial Right
 
 Result Date: 2019
 1. Right leg shows biphasic inflow with a greater than 50% stenosis at the origin of the dobbins
perficial femoral artery (137-309).  Biphasic outflow. 2. Two vessel runoff to the right jeanne
t. Signed by: Eugenio Hoffman Sign Date/Time: 2019 3:11 PM
 
 
 Cherie Villalobos is a 68 y.o. female with the following Problems 
 Patient Active Problem List 
 Diagnosis 
   Proteinuria 
   Vitamin D deficiency 
   Secondary hyperparathyroidism (HCC) 
   Recurrent UTI 
   Coronary artery disease involving native coronary artery of native heart without angina
 pectoris 
   Depression 
   ESRD (end stage renal disease) (HCC) 
   Type 2 diabetes mellitus with chronic kidney disease on chronic dialysis, with long-ter
m current use of insulin (HCC) 
   Anemia in ESRD (end-stage renal disease) (HCC) 
   Hypertension, essential 
   Dyslipidemia 
   Gastroesophageal reflux disease without esophagitis 
   Diabetic foot ulcer (HCC) 
   Loss of weight 
   Dysphagia, unspecified 
   Foot ulcer due to DM  (HCC) 
   Severe protein-calorie malnutrition (HCC) 
   Osteomyelitis of left foot (HCC) 
   Pleural effusion 
   Prolonged Q-T interval on ECG 
   Chronic systolic congestive heart failure (HCC) 
   Chronic diarrhea 
   Chronic anticoagulation 
   Plantar ulcer (HCC) 
   Cardiomyopathy (HCC) 
   COPD (chronic obstructive pulmonary disease) (HCC) 
   ICD (implantable cardioverter-defibrillator) in place 
   Implantable defibrillator reprogramming/check 
   Mixed hyperlipidemia 
   Moderate protein-calorie malnutrition (HCC) 
   Paroxysmal atrial fibrillation (HCC) 
   S/P CABG x 2 
   S/P mitral valve repair 
   Steroid-induced hyperglycemia 
   Stress hyperglycemia 
   Chronic multifocal osteomyelitis of left foot (HCC) 
   PVD (peripheral vascular disease) (HCC) 
   CAD (coronary artery disease) 
   Chest pain 
 
 Plan:
 
 Silver sulfadiazene cream 2x day on the wound. 
 Follow up  Blood culture 2x on different site.
 Follow up  Gram Stain and Culture
 Continue with iv ceftazidime, vancomycin for one week 
 Follow up in one week.
 Discussed with Dr. Josue hospitalist  who agreed and will proceed
 As plan.
 Thank you very much for the consult.
 ______________________
 
 ______________________
 MD Jayson FRANKLIN Christopher D, [facility] - 2019 11:00 AM PSTFormatting of this note may be different
 from the original.
 Vancomycin Monitoring
 
 S:  Pharmacy to dose vancomycin per protocol.  Diagnosis:  Cellulitis.
 O: 
 Lab Results 
 Component Value Date/Time 
  CREATININE 6.8 (H) 2019 04:53 AM 
  WBC 3.39 (L) 2019 04:53 AM 
 
  Wt= 65.5 kg, CrCl= HD T,Th,S. Tmax afeb,
  Cultures= Bloodx2 pending.  Other Abx= ceftazidime, metronidazole. 
 A:  Trough goal:  10- 20 ug/mL
 P: Continue vancomycin per protocol. No levels needed at this time. Will continue to monito
r. 
 
 Pharmacist: Prudence Alcantar MD-R2 - 2019  6:55 AM PSTFormatting of this note may be different fr
om the original.
 [facility]
 Service: Hospitalist
 Resident Progress Note
 
 Hospital Day:  LOS: 1 day 
 Consultants: Treatment Team: 
 Consulting Physician: Srinivasan Jordan DPM
 Consulting Physician: Kirt Mclaughlin MD
 Consulting Physician: Ethan Awad MD
 Admitting Provider: Luiza Rosales MD
 SUBJECTIVE
 
      Patient Summary:  Per Dr. Rosales's H and P from 2019: '69 y/o F with h/o ESRD
 on HD T,TH,Sat (Dr. Asher), DM2, PAD s/p angioplasty of LLE and transmetatarsal amputation 
of left foot 18 by Dr. Jordan due to osteomyelitis, CAD, s/p MI, CABG, AVR , HFrE
F with last EF 20 to 25%, s/p AICD, AF on chronic anticoagulation who was sent to ED by Dr. Elliott for evaluation of right LE.  Patient reports that at HD yesterday it was noted that her
 right toes were red and dusky.  She went to see Dr. Elliott this morning and he was concerned 
that right toes could be ischemic or infected and referred to ED. '
 Vascular surgery was consulted from the emergency department, they did a selective catheter
ization of the left common femoral artery and placed a stent. The patient tolerated the proc
edure well.
      Events Overnight:  
  - No acute overnight events. Afebrile, vital signs stable. The patient reports her pain is
 markedly improved, she states the color is also better to her foot. She has had no other pa
in, no shortness of breath, no nausea or vomiting. Her last bowel movement was this morning.
 Medications: Fentanyl, Norco, Ativan, Versed  2
 
 Scheduled Medications   aspirin  81 mg Oral Daily with breakfast 
   atorvastatin  40 mg Oral Nightly 
   calcium acetate  667 mg Oral TID WC 
   carvedilol  12.5 mg Oral BID WC 
   cefTAZidime  2 g Intravenous After Hemodialysis (see admin instructions) 
   cholecalciferol  5,000 Units Oral Daily 
   clopidogrel  75 mg Oral Daily 
   famotidine  10 mg Oral Daily 
   insulin glargine  15 Units Subcutaneous Nightly 
   insulin lispro (human)  0-3 Units Subcutaneous Nightly 
   insulin lispro (human)  0-6 Units Subcutaneous TID AC 
 
   isosorbide mononitrate  60 mg Oral Daily 
   metroNIDAZOLE  500 mg Oral 3 times per day 
   nortriptyline  25 mg Oral QAM 
  And 
   nortriptyline  75 mg Oral Nightly 
   oxybutynin  2.5 mg Oral BID 
   pantoprazole  40 mg Oral QAM AC 
   rOPINIRole  0.75 mg Oral Nightly 
   sodium chloride (PF)  10 mL Intravenous Q8H 
   vancomycin  500 mg Intravenous See Admin Instructions 
   Vortioxetine HBr  10 mg Oral Nightly 
 
 Continuous Infusions 
   dextrose   
 
 PRN Medications
 acetaminophen **OR** acetaminophen, albuterol, dextrose, dextrose, dextrose, fentaNYL **OR*
* fentaNYL, glucagon, glucagon, HYDROcodone-acetaminophen **OR** HYDROcodone-acetaminophen, 
HYDROmorphone **OR** HYDROmorphone, loperamide, LORazepam, nitroGLYCERIN, ondansetron **OR**
 ondansetron, zolpidem
 
 OBJECTIVE
 Vital Signs:
 /56 (BP Location: Left leg)  | Pulse 77  | Temp 98.5 F (36.9 C) (Oral)  | Resp 16
  | Ht 1.778 m (5' 10")  | Wt 65.5 kg (144 lb 6.4 oz)  | SpO2 98%  | BMI 20.72 kg/m 
 
 Physical Exam
 
 General Appearance: Alert and cooperative, sitting up in bed receiving dialysis and appears
 to be in no acute distress. 
 
 HEENNT: Normocephalic and atraumatic. Hearing grossly intact. No nasal discharge. No trache
al deviation. 
 
 Cardiovascular: Rhythm and rate are regular. 2/6 systolic murmur heard best over the left u
pper sternal border. No gallops or rubs appreciated. Peripheral pulses 1+ on the right. No l
ower extremity edema.
 
 Pulmonary/Chest: Lungs are clear to auscultation bilaterally. Effort is normal. Normal jaodn
th sounds. 
 
 Abdominal: Soft, nontender, nondistended. Normoactive bowel sounds. No guarding or rebound 
tenderness. 
 
 Musculoskeletal: Normal range of motion. Normal muscular development. Patient with forefoot
 amputation on the left. Left foot wrapped in dressing, C/D/I. Right foot with erythema over
 the great big toe and cyanosis over the second toe, patient stated this is improved from pr
ior. Her right foot is warm to the touch and has palpable dorsalis pedis pulse.
 
 Neurological: CN II-XII grossly intact. Oriented to person, place, and time. 
 
 Skin: Skin is warm and dry. No rash noted. 
 
 Psychiatric:The patient was able to demonstrate good judgement and reason, without hallucin
ations, abnormal affect or abnormal behaviors during the examination. 
 
 DATA
 
 Recent Labs
 Lab 19
 
 0453 19
 1543 19
 0554 19
 0847 
 WBC 3.39* 5.53 4.08 4.23 
 HGB 9.5* 9.4* 9.7* 8.5* 
 HCT 29.2* 28.4* 29.7* 26.0* 
 * 147* 125* 134* 
 NEUTOPHILPCT 66.95  --   --  74.02 
 MONOPCT 11.96  --   --  7.65 
 
 Recent Labs
 Lab 19
 0453 19
 1543 19
 0554 19
 0847 
  139 138 139 
 K 4.2 4.0 4.4 4.4 
  100 100 99 
 CO2 28 29 28 32 
 BUN 26* 23 15 24 
 CREATININE 6.8* 6.1* 4.9* 6.5* 
 ALB  --  2.7* 2.6* 2.2* 
 PROT  --  6.2* 6.1*  --  
 BILITOT  --  0.4 0.5  --  
 ALT  --    --  
 AST  --  24   --  
 
 Phosphorus:  
 Lab Results 
 Component Value Date 
  PHOS 4.4 2019 
 
 Recent Labs
 Lab 19
 1543 
 CKTOTAL 28* 
 
 Intake/Output Summary (Last 24 hours) at 19 0655
 Last data filed at 19 0341
  Gross per 24 hour 
 Intake              300 ml 
 Output                0 ml 
 Net              300 ml 
 
 Microbiology: Blood cultures pending
 
 Radiology
 Us Lower Extremty  Arterial Right
 
 Result Date: 2019
 1. Right leg shows biphasic inflow with a greater than 50% stenosis at the origin of the dobbins
perficial femoral artery (137-309).  Biphasic outflow. 2. Two vessel runoff to the right jeanne
t. Signed by: Eugenio Hoffman Sign Date/Time: 2019 3:11 PM
 
 PROBLEM LIST
 
 Principal Problem:
   PVD (peripheral vascular disease) ([facility])
 
 Active Problems:
   ESRD (end stage renal disease) ([facility])
   Type 2 diabetes mellitus with chronic kidney disease on chronic dialysis, with long-term 
current use of insulin ([facility])
   Anemia in ESRD (end-stage renal disease) ([facility])
   Hypertension, essential
   Chronic systolic congestive heart failure ([facility])
   COPD (chronic obstructive pulmonary disease) ([facility])
   Paroxysmal atrial fibrillation ([facility])
   CAD (coronary artery disease)
 Resolved Problems:
   * No resolved hospital problems. *
 
 ASSESSMENT & PLAN
 
 Patient Active Hospital Problem List:
  PVD (peripheral vascular disease) ([facility]) (2018)
   Assessment: Sent by ASAF Torrez, to the emergency department. Dr. Mclaughlin was consulted, IR 
angiogram yesterday. She was found to have mesenteric stenosis and PAD. Stent was placed in 
the left common iliac artery. She tolerated the catheterization well, and has improved vascu
lar blood flow to the right foot, although still with some cyanosis of the second toe.
   Plan: 
 Infectious disease consulting, appreciate recommendations
 IV Ceftazidime, vancomycin, metronidazole
 Vascular surgery consulting, appreciate recommendations
 Restarted apixaban (eliquis) today
 Fentanyl, Tylenol, Norco available prn for pain control
 Wound care consulted, appreciate recommendations
 Podiatry has also been consulted
 
  ESRD (end stage renal disease) ([facility]) (2016)
    Anemia in ESRD (end-stage renal disease) ([facility]) (2016)
 Assessment: With dialysis Tuesday, Thursday, Saturday, sees Dr. Asher clinic. She received 
her regular dialysis today without issue.
   Plan: 
 Nephrology consulting, appreciate recommendations.
 Continue home PhosLo, vitamin D
 She receives EPO injections with her dialysis
 
  Type 2 diabetes mellitus with chronic kidney disease on chronic dialysis, with long-term c
urrent use of insulin ([facility]) (2016)
   Assessment: The patient reports she normally takes 21 units of long-acting insulin at nig
ht with an insulin sliding scale at meals. Last hemoglobin A1c 7.5 percent in 2018.
 
   Plan: Lantus 15 units nightly
 Mild insulin sliding scale
 Aspirin, statin
 
  Hypertension, essential (2016)
   Assessment: Patient's blood pressure has been normal, and on the side of low during dialy
sis but with systolic blood pressure greater than 90.
   Plan: 
 Continue home Coreg, Imdur 
 
  Chronic systolic congestive heart failure ([facility]) (2018)
   Assessment: Last echo done 2018 showed severely impaired EF 20-25 percent, mild 
aortic regurgitation, moderate aortic stenosis, mild to moderate mitral regurgitation with m
oderate pulmonary hypertension. 
   Plan:
 Continue home Aspirin, statin, Coreg, Imdur
 
 
  COPD
 Assessment: The patient currently states that this is stable, she is currently satting well
 on room air with no shortness of breath.
 Plan: Albuterol prn
 
  CAD (coronary artery disease) (2018)
   Assessment: She has a history of chest pain with dialysis, however this has not happened 
during this hospitalization. Last catheterization 2018, CABG in 2018. 
   Plan: 
 Continue home Aspirin, statin, Imdur
 
 Continue home for Vortioxetine, nortriptyline, oxybutynin, pantoprazole
 Currently holding losartan
 
 Diet: Diabetic 
 DVT Prophylaxis: Eliquis
 Disposition: Inpatient
 Code Status:  Full Code
 
 Prudence Nicholson MD-R2
 2019
 6:55 AM
 
 
 Associated attestation - Moiz Josue MD - 2019  5:13 PM PSTPatient seen an
d examined along with residents. All labs and notes personally reviewed by me. Patient under
went vascular procedure yesterday with angioplasty and stenting of right SFA. She now has wa
rm right foot indicating resumption of circulation. Patient is on three antibiotics (vanc, F
lagyl and ceftazidime) by Dr. Elliott for right first and second toe cellulitis. No surgical in
tervention needed at this stage for right foot infection. Appreciate help of Dr. Mclaughlin, Dr. Norma clemente and Dr. Jordan. 
 I agree with the progress note of Dr. Nicholson. Abigail Morgan, [facility] - 2019  8:10 PM 
PSTFormatting of this note may be different from the original.
 Clinical Pharmacy Note: Renal Monitoring
 Cherie Villalobos 68 y.o. female
 Ht Readings from Last 1 Encounters: 
 19 1.778 m (5' 10") 
   
 Wt Readings from Last 1 Encounters: 
 19 65.5 kg (144 lb 6.4 oz) 
  
 Serum creatinine: 6.1 mg/dL (H) 19 1543
 Estimated creatinine clearance: 9 mL/min (A)
 Patient has h/o ESRD on HD qTTS
 
 Pharmacy dosing for renal function per Dr. Luiza Rosales.
 
 Currently, there are no medications needing to be adjusted.
 
 Pharmacy will continue to monitor and make changes as needed per renal function. 
 
 Abigail Morgan, [facility]
 PGY-1 Pharmacy Resident
 in this encounter
 
 Plan of Treatment
 
 
+--------+---------+-----------+----------------------+-------------+
 
| Date   | Type    | Specialty | Care Team            | Description |
+--------+---------+-----------+----------------------+-------------+
| 04/10/ | Office  | Podiatry  |   Srinivasan Jordan,  |             |
| 2019   | Visit   |           | DPM  [address] [facility], |             |
|        |         |           |  [address]  [place],  |             |
|        |         |           | [place] [address]             |             |
|        |         |           | [phone]         |             |
|        |         |           | [phone] (Fax)   |             |
+--------+---------+-----------+----------------------+-------------+
 as of this encounter
 
 Procedures
 
 
+----------------------+--------+-------------+----------------------+----------------------
+
| Procedure Name       | Priori | Date/Time   | Associated Diagnosis | Comments             
|
|                      | ty     |             |                      |                      
|
+----------------------+--------+-------------+----------------------+----------------------
+
| POCT GLUCOSE         | Routin | 2019  |                      |   Results for this   
|
|                      | e      | 12:10 PM    |                      | procedure are in the 
|
|                      |        | PST         |                      |  results section.    
|
+----------------------+--------+-------------+----------------------+----------------------
+
| POCT GLUCOSE         | Routin | 2019  |                      |   Results for this   
|
|                      | e      |  5:55 AM    |                      | procedure are in the 
|
|                      |        | PST         |                      |  results section.    
|
+----------------------+--------+-------------+----------------------+----------------------
+
| CBC W/AUTO DIFF      | Routin | 2019  |                      |   Results for this   
|
| (REFLEX TO MANUAL)   | e      |  5:38 AM    |                      | procedure are in the 
|
|                      |        | PST         |                      |  results section.    
|
+----------------------+--------+-------------+----------------------+----------------------
+
| PHOSPHOROUS          | Routin | 2019  |                      |   Results for this   
|
|                      | e      |  5:38 AM    |                      | procedure are in the 
|
|                      |        | PST         |                      |  results section.    
|
+----------------------+--------+-------------+----------------------+----------------------
+
| MAGNESIUM            | Routin | 2019  |                      |   Results for this   
|
|                      | e      |  5:38 AM    |                      | procedure are in the 
|
|                      |        | PST         |                      |  results section.    
|
 
+----------------------+--------+-------------+----------------------+----------------------
+
| BASIC METABOLIC      | Routin | 2019  |                      |   Results for this   
|
| PANEL                | e      |  5:38 AM    |                      | procedure are in the 
|
|                      |        | PST         |                      |  results section.    
|
+----------------------+--------+-------------+----------------------+----------------------
+
| POCT GLUCOSE         | Routin | 2019  |                      |   Results for this   
|
|                      | e      |  9:29 PM    |                      | procedure are in the 
|
|                      |        | PST         |                      |  results section.    
|
+----------------------+--------+-------------+----------------------+----------------------
+
| POCT GLUCOSE         | Routin | 2019  |                      |   Results for this   
|
|                      | e      |  4:06 PM    |                      | procedure are in the 
|
|                      |        | PST         |                      |  results section.    
|
+----------------------+--------+-------------+----------------------+----------------------
+
| POCT GLUCOSE         | Routin | 2019  |                      |   Results for this   
|
|                      | e      | 12:13 PM    |                      | procedure are in the 
|
|                      |        | PST         |                      |  results section.    
|
+----------------------+--------+-------------+----------------------+----------------------
+
| EKG STANDARD 12 LEAD | Routin | 2019  |                      |   Results for this   
|
|                      | e      |  6:18 AM    |                      | procedure are in the 
|
|                      |        | PST         |                      |  results section.    
|
+----------------------+--------+-------------+----------------------+----------------------
+
| POCT GLUCOSE         | Routin | 2019  |                      |   Results for this   
|
|                      | e      |  5:21 AM    |                      | procedure are in the 
|
|                      |        | PST         |                      |  results section.    
|
+----------------------+--------+-------------+----------------------+----------------------
+
| CBC W/AUTO DIFF      | Routin | 2019  |                      |   Results for this   
|
| (REFLEX TO MANUAL)   | e - AM |  4:53 AM    |                      | procedure are in the 
|
|                      |        | PST         |                      |  results section.    
|
+----------------------+--------+-------------+----------------------+----------------------
+
| BASIC METABOLIC      | Routin | 2019  |                      |   Results for this   
|
 
| PANEL                | e - AM |  4:53 AM    |                      | procedure are in the 
|
|                      |        | PST         |                      |  results section.    
|
+----------------------+--------+-------------+----------------------+----------------------
+
| POCT GLUCOSE         | Routin | 2019  |                      |   Results for this   
|
|                      | e      |  9:00 PM    |                      | procedure are in the 
|
|                      |        | PST         |                      |  results section.    
|
+----------------------+--------+-------------+----------------------+----------------------
+
| IR STENT FEMORAL     | Routin | 2019  |                      |   Results for this   
|
| POPLITEAL            | e      |  6:45 PM    |                      | procedure are in the 
|
|                      |        | PST         |                      |  results section.    
|
+----------------------+--------+-------------+----------------------+----------------------
+
| IR ANGIOGRAM         | Routin | 2019  |                      |   Results for this   
|
| EXTREMITY RIGHT      | e      |  6:45 PM    |                      | procedure are in the 
|
|                      |        | PST         |                      |  results section.    
|
+----------------------+--------+-------------+----------------------+----------------------
+
| IR AORTAGRAM         | Routin | 2019  |                      |   Results for this   
|
| ABDOMINAL            | e      |  6:45 PM    |                      | procedure are in the 
|
| SERIALOGRAM          |        | PST         |                      |  results section.    
|
+----------------------+--------+-------------+----------------------+----------------------
+
| BLOOD CULTURE, SET 2 | Timed  | 2019  |                      |   Results for this   
|
|                      |        |  6:05 PM    |                      | procedure are in the 
|
|                      |        | PST         |                      |  results section.    
|
+----------------------+--------+-------------+----------------------+----------------------
+
| IR GUIDANCE VASCULAR | Routin | 2019  |                      |   Results for this   
|
|  ACCESS US           | e      |  5:40 PM    |                      | procedure are in the 
|
|                      |        | PST         |                      |  results section.    
|
+----------------------+--------+-------------+----------------------+----------------------
+
| BLOOD CULTURE, SET 1 | Timed  | 2019  |                      |   Results for this   
|
|                      |        |  3:43 PM    |                      | procedure are in the 
|
|                      |        | PST         |                      |  results section.    
|
 
+----------------------+--------+-------------+----------------------+----------------------
+
| SEDIMENTATION RATE,  | STAT   | 2019  |                      |   Results for this   
|
| AUTOMATED            |        |  3:43 PM    |                      | procedure are in the 
|
|                      |        | PST         |                      |  results section.    
|
+----------------------+--------+-------------+----------------------+----------------------
+
| CBC W/MANUAL DIFF    | STAT   | 2019  |                      |   Results for this   
|
|                      |        |  3:43 PM    |                      | procedure are in the 
|
|                      |        | PST         |                      |  results section.    
|
+----------------------+--------+-------------+----------------------+----------------------
+
| C-REACTIVE PROTEIN   | STAT   | 2019  |                      |   Results for this   
|
|                      |        |  3:43 PM    |                      | procedure are in the 
|
|                      |        | PST         |                      |  results section.    
|
+----------------------+--------+-------------+----------------------+----------------------
+
| CK                   | STAT   | 2019  |                      |   Results for this   
|
|                      |        |  3:43 PM    |                      | procedure are in the 
|
|                      |        | PST         |                      |  results section.    
|
+----------------------+--------+-------------+----------------------+----------------------
+
| COMPREHENSIVE        | STAT   | 2019  |                      |   Results for this   
|
| METABOLIC PANEL      |        |  3:43 PM    |                      | procedure are in the 
|
|                      |        | PST         |                      |  results section.    
|
+----------------------+--------+-------------+----------------------+----------------------
+
| US LOWER EXTREMITY   | STAT   | 2019  |                      |   Results for this   
|
| ARTERIAL RIGHT       |        |  2:23 PM    |                      | procedure are in the 
|
|                      |        | PST         |                      |  results section.    
|
+----------------------+--------+-------------+----------------------+----------------------
+
| ED INFORMATION       | Routin | 2019  |                      |   Results for this   
|
| EXCHANGE             | e      |  1:03 PM    |                      | procedure are in the 
|
|                      |        | PST         |                      |  results section.    
|
+----------------------+--------+-------------+----------------------+----------------------
+
 in this encounter
 
 
 Results
 POCT glucose (2019 12:10 PM)
 
+--------------------+--------------------------+---------------+-----------------+
| Component          | Value                    | Ref Range     | Performed At    |
+--------------------+--------------------------+---------------+-----------------+
| GLUCOSE,POC SCREEN | 237 (H)Comment: Testing  | 65 - 99 mg/dL | [facility] |
|                    | performed at [facility];[address]     |               |                 |
|                    | [facility];[place], [place]   |               |                 |
|                    | [address]                    |               |                 |
+--------------------+--------------------------+---------------+-----------------+
 
 
 
+-------------------+------------------+--------------------+--------------+
| Performing        | Address          | City/State/Zipcode | Phone Number |
| Organization      |                  |                    |              |
+-------------------+------------------+--------------------+--------------+
|   [facility] |   [address] | [place] [address] |              |
+-------------------+------------------+--------------------+--------------+
 POCT glucose (2019  5:55 AM)
 
+--------------------+--------------------------+---------------+-----------------+
| Component          | Value                    | Ref Range     | Performed At    |
+--------------------+--------------------------+---------------+-----------------+
| GLUCOSE,POC SCREEN | 135 (H)Comment: Testing  | 65 - 99 mg/dL | [facility] |
|                    | performed at [facility];[address]     |               |                 |
|                    | [address];[place]   |               |                 |
|                    | [address]                    |               |                 |
+--------------------+--------------------------+---------------+-----------------+
 
 
 
+-------------------+------------------+--------------------+--------------+
| Performing        | Address          | City/State/Zipcode | Phone Number |
| Organization      |                  |                    |              |
+-------------------+------------------+--------------------+--------------+
|   [facility] |   [address] | [place] [address] |              |
+-------------------+------------------+--------------------+--------------+
 Phosphorus (2019  5:38 AM)
 
+------------+--------------------------+-----------------+--------------+
| Component  | Value                    | Ref Range       | Performed At |
+------------+--------------------------+-----------------+--------------+
| PHOSPHORUS | 3.6Comment: Testing      | 2.3 - 4.8 mg/dL | [facility]   |
|            | performed at [facility], [address] |                 | LABORATORY   |
|            |  [address],        |                 |              |
|            | [place]  [address]     |                 |              |
+------------+--------------------------+-----------------+--------------+
 
 
 
+----------+
| Specimen |
+----------+
| Blood    |
+----------+
 
 
 
 
+---------------+-------------------------+---------------------+----------------+
| Performing    | Address                 | City/State/Zipcode  | Phone Number   |
| Organization  |                         |                     |                |
+---------------+-------------------------+---------------------+----------------+
|   [facility]  |   [address]  | [place] [address] |   [phone] |
| LABORATORY    | [address]                   |                     |                |
+---------------+-------------------------+---------------------+----------------+
 Magnesium (2019  5:38 AM)
 
+-----------+--------------------------+-----------------+--------------+
| Component | Value                    | Ref Range       | Performed At |
+-----------+--------------------------+-----------------+--------------+
| MAGNESIUM | 2.3Comment: Testing      | 1.7 - 2.4 mg/dL | [facility]   |
|           | performed at [facility], [address] |                 | LABORATORY   |
|           |  [facility],        |                 |              |
|           | [place]  [address]     |                 |              |
+-----------+--------------------------+-----------------+--------------+
 
 
 
+----------+
| Specimen |
+----------+
| Blood    |
+----------+
 
 
 
+---------------+-------------------------+---------------------+----------------+
| Performing    | Address                 | City/State/Zipcode  | Phone Number   |
| Organization  |                         |                     |                |
+---------------+-------------------------+---------------------+----------------+
|   [facility]  |   [address]  | [place] [address] |   [phone] |
| LABORATORY    | [address]                   |                     |                |
+---------------+-------------------------+---------------------+----------------+
 Basic metabolic panel (2019  5:38 AM)
 
+----------------+--------------------------+-------------------+--------------+
| Component      | Value                    | Ref Range         | Performed At |
+----------------+--------------------------+-------------------+--------------+
| SODIUM         | 139                      | 135 - 145 mmol/L  | [facility]   |
|                |                          |                   | LABORATORY   |
+----------------+--------------------------+-------------------+--------------+
| POTASSIUM      | 3.9                      | 3.5 - 4.9 mmol/L  | [facility]   |
|                |                          |                   | LABORATORY   |
+----------------+--------------------------+-------------------+--------------+
| CHLORIDE       | 97 (L)                   | 99 - 109 mmol/L   | [facility]   |
|                |                          |                   | LABORATORY   |
+----------------+--------------------------+-------------------+--------------+
| CO2            | 31                       | 23 - 32 mmol/L    | [facility]   |
|                |                          |                   | LABORATORY   |
+----------------+--------------------------+-------------------+--------------+
| ANION GAP AGAP | 15                       | 5 - 20 mmol/L     | [facility]   |
|                |                          |                   | LABORATORY   |
+----------------+--------------------------+-------------------+--------------+
| GLUCOSE        | 169 (H)                  | 65 - 99 mg/dL     | [facility]   |
|                |                          |                   | LABORATORY   |
+----------------+--------------------------+-------------------+--------------+
| BUN            | 13                       | 8 - 25 mg/dL      | [facility]   |
|                |                          |                   | LABORATORY   |
 
+----------------+--------------------------+-------------------+--------------+
| CREATININE     | 4.8 (H)                  | 0.50 - 1.00 mg/dL | [facility]   |
|                |                          |                   | LABORATORY   |
+----------------+--------------------------+-------------------+--------------+
| BUN/CREAT      | 3                        |                   | [facility]   |
|                |                          |                   | LABORATORY   |
+----------------+--------------------------+-------------------+--------------+
| CALCIUM        | 9.4                      | 8.5 - 10.5 mg/dL  | [facility]   |
|                |                          |                   | LABORATORY   |
+----------------+--------------------------+-------------------+--------------+
| EGFR           | 9 (L)Comment: GFR <60:   | >60 mL/min/1.73m2 | [facility]   |
|                | CHRONIC KIDNEY DISEASE,  |                   | LABORATORY   |
|                | IF FOUND OVER A 3 MONTH  |                   |              |
|                | PERIOD.GFR <15: KIDNEY   |                   |              |
|                | FAILURE.FOR       |                   |              |
|                | [employer], MULTIPLY THE  |                   |              |
|                | CALCULATED GFR BY        |                   |              |
|                | 1.210.This eGFR is       |                   |              |
|                | calculated using the     |                   |              |
|                | MDRD IDMS traceable      |                   |              |
|                | equation.Testing         |                   |              |
|                | performed at [facility], [address] |                   |              |
|                |  [facility],        |                   |              |
|                | [place]    [address]   |                   |              |
+----------------+--------------------------+-------------------+--------------+
 
 
 
+----------+
| Specimen |
+----------+
| Blood    |
+----------+
 
 
 
+---------------+-------------------------+---------------------+----------------+
| Performing    | Address                 | City/State/Zipcode  | Phone Number   |
| Organization  |                         |                     |                |
+---------------+-------------------------+---------------------+----------------+
|   [facility]  |   [address]  | [place] [address] |   [phone] |
| LABORATORY    | [address]                   |                     |                |
+---------------+-------------------------+---------------------+----------------+
 CBC w/auto diff (reflex to manual) (2019  5:38 AM)
 
+----------------------+--------------------------------------------------------------------
-----+-------------------+--------------+
| Component            | Value                                                              
     | Ref Range         | Performed At |
+----------------------+--------------------------------------------------------------------
-----+-------------------+--------------+
| WBC                  | 3.90                                                               
     | 3.80 - 11.00 K/uL | [facility]   |
|                      |                                                                    
     |                   | LABORATORY   |
+----------------------+--------------------------------------------------------------------
-----+-------------------+--------------+
| RBC                  | 2.87 (L)                                                           
     | 3.70 - 5.10 M/uL  | [facility]   |
|                      |                                                                    
 
     |                   | LABORATORY   |
+----------------------+--------------------------------------------------------------------
-----+-------------------+--------------+
| HGB                  | 9.8 (L)                                                            
     | 11.3 - 15.5 g/dL  | [facility]   |
|                      |                                                                    
     |                   | LABORATORY   |
+----------------------+--------------------------------------------------------------------
-----+-------------------+--------------+
| HCT                  | 30.1 (L)                                                           
     | 34.0 - 46.0 %     | [facility]   |
|                      |                                                                    
     |                   | LABORATORY   |
+----------------------+--------------------------------------------------------------------
-----+-------------------+--------------+
| MCV                  | 105.1 (H)                                                          
     | 80.0 - 100.0 fl   | [facility]   |
|                      |                                                                    
     |                   | LABORATORY   |
+----------------------+--------------------------------------------------------------------
-----+-------------------+--------------+
| MCH                  | 34.3 (H)                                                           
     | 27.0 - 34.0 pg    | [facility]   |
|                      |                                                                    
     |                   | LABORATORY   |
+----------------------+--------------------------------------------------------------------
-----+-------------------+--------------+
| MCHC                 | 32.7                                                               
     | 32.0 - 35.5 g/dL  | [facility]   |
|                      |                                                                    
     |                   | LABORATORY   |
+----------------------+--------------------------------------------------------------------
-----+-------------------+--------------+
| RDW SD               | 61.7 (H)                                                           
     | 37 - 53 fl        | [facility]   |
|                      |                                                                    
     |                   | LABORATORY   |
+----------------------+--------------------------------------------------------------------
-----+-------------------+--------------+
| PLT                  | 137 (L)                                                            
     | 150 - 400 K/uL    | [facility]   |
|                      |                                                                    
     |                   | LABORATORY   |
+----------------------+--------------------------------------------------------------------
-----+-------------------+--------------+
| MPV                  | 9.5                                                                
     | fl                | [facility]   |
|                      |                                                                    
     |                   | LABORATORY   |
+----------------------+--------------------------------------------------------------------
-----+-------------------+--------------+
| DIFF TYPE            | MANUAL                                                             
     |                   | [facility]   |
|                      |                                                                    
     |                   | LABORATORY   |
+----------------------+--------------------------------------------------------------------
-----+-------------------+--------------+
| Neutrophils Manual   | 63                                                                 
     | %                 | [facility]   |
|                      |                                                                    
 
     |                   | LABORATORY   |
+----------------------+--------------------------------------------------------------------
-----+-------------------+--------------+
| Lymphocytes Manual   | 30                                                                 
     | %                 | [facility]   |
|                      |                                                                    
     |                   | LABORATORY   |
+----------------------+--------------------------------------------------------------------
-----+-------------------+--------------+
| Monocytes Manual     | 7                                                                  
     | %                 | [facility]   |
|                      |                                                                    
     |                   | LABORATORY   |
+----------------------+--------------------------------------------------------------------
-----+-------------------+--------------+
| Neutrophils Absolute | 2.46                                                               
     | 1.90 - 7.40 K/uL  | [facility]   |
|                      |                                                                    
     |                   | LABORATORY   |
+----------------------+--------------------------------------------------------------------
-----+-------------------+--------------+
| Lymphocytes Absolute | 1.17                                                               
     | 1.00 - 3.90 K/uL  | [facility]   |
|                      |                                                                    
     |                   | LABORATORY   |
+----------------------+--------------------------------------------------------------------
-----+-------------------+--------------+
| Monocytes Absolute   | 0.27                                                               
     | 0.00 - 0.80 K/uL  | [facility]   |
|                      |                                                                    
     |                   | LABORATORY   |
+----------------------+--------------------------------------------------------------------
-----+-------------------+--------------+
| MORPHOLOGY           | 1+Comment:                                                         
     |                   | [facility]   |
|                      | ANISO1+HYPONORMAL PLT                                              
     |                   | LABORATORY   |
|                      | MORPHTesting performed                                             
     |                   |              |
|                      | at [facility], [address]                                                     
     |                   |              |
|                      | [facility],                                                   
     |                   |              |
|                      | [place]    [address]                                             
     |                   |              |
|                      |Testing performed at [facility], [address] [facility], [place], [place]    [address] |                   |              |
|                      |                                                                    
     |                   |              |
+----------------------+--------------------------------------------------------------------
-----+-------------------+--------------+
 
 
 
+----------+
| Specimen |
+----------+
| Blood    |
+----------+
 
 
 
 
+---------------+-------------------------+---------------------+----------------+
| Performing    | Address                 | City/State/Zipcode  | Phone Number   |
| Organization  |                         |                     |                |
+---------------+-------------------------+---------------------+----------------+
|   [facility]  |   [address]  | [place] [address] |   [phone] |
| LABORATORY    | [address]                   |                     |                |
+---------------+-------------------------+---------------------+----------------+
 POCT glucose (2019  9:29 PM)
 
+--------------------+--------------------------+---------------+-----------------+
| Component          | Value                    | Ref Range     | Performed At    |
+--------------------+--------------------------+---------------+-----------------+
| GLUCOSE,POC SCREEN | 262 (H)Comment: Testing  | 65 - 99 mg/dL | [facility] |
|                    | performed at [facility];[address]     |               |                 |
|                    | [address];[place]   |               |                 |
|                    | [address]                    |               |                 |
+--------------------+--------------------------+---------------+-----------------+
 
 
 
+-------------------+------------------+--------------------+--------------+
| Performing        | Address          | City/State/Zipcode | Phone Number |
| Organization      |                  |                    |              |
+-------------------+------------------+--------------------+--------------+
|   [facility] LAB[facility]ORY |   [address] | [place] [address] |              |
+-------------------+------------------+--------------------+--------------+
 POCT glucose (2019  4:06 PM)
 
+--------------------+--------------------------+---------------+-----------------+
| Component          | Value                    | Ref Range     | Performed At    |
+--------------------+--------------------------+---------------+-----------------+
| GLUCOSE,POC SCREEN | 193 (H)Comment: Testing  | 65 - 99 mg/dL | [facility] |
|                    | performed at [facility];[address]     |               |                 |
|                    | [address];[place]   |               |                 |
|                    | [address]                    |               |                 |
+--------------------+--------------------------+---------------+-----------------+
 
 
 
+-------------------+------------------+--------------------+--------------+
| Performing        | Address          | City/State/Zipcode | Phone Number |
| Organization      |                  |                    |              |
+-------------------+------------------+--------------------+--------------+
|   [facility] |   [address] | [place] [place] [address] |              |
+-------------------+------------------+--------------------+--------------+
 POCT glucose (2019 12:13 PM)
 
+--------------------+--------------------------+---------------+-----------------+
| Component          | Value                    | Ref Range     | Performed At    |
+--------------------+--------------------------+---------------+-----------------+
| GLUCOSE,POC SCREEN | 145 (H)Comment: Testing  | 65 - 99 mg/dL | [facility] |
|                    | performed at [facility];[address]     |               |                 |
|                    | [address];[place]   |               |                 |
|                    | [address]                    |               |                 |
+--------------------+--------------------------+---------------+-----------------+
 
 
 
 
+-------------------+------------------+--------------------+--------------+
| Performing        | Address          | City/State/Zipcode | Phone Number |
| Organization      |                  |                    |              |
+-------------------+------------------+--------------------+--------------+
|   [facility] |   [address] | [place] [address] |              |
+-------------------+------------------+--------------------+--------------+
 EKG STANDARD 12 LEAD (2019  6:18 AM)
 
+-------------------+--------------------------+-----------+--------------+
| Component         | Value                    | Ref Range | Performed At |
+-------------------+--------------------------+-----------+--------------+
| Ventricular Rate  | 73                       | BPM       | [facility] E[facility]     |
+-------------------+--------------------------+-----------+--------------+
| Atrial Rate       | 73                       | BPM       | [facility]     |
+-------------------+--------------------------+-----------+--------------+
| P-R Interval      | 146                      | ms        | [facility]     |
+-------------------+--------------------------+-----------+--------------+
| QRS Duration      | 128                      | ms        | [facility]     |
+-------------------+--------------------------+-----------+--------------+
| Q-T Interval      | 464                      | ms        | [facility]     |
+-------------------+--------------------------+-----------+--------------+
| QTC Calculation   | 511                      | ms        | [facility]     |
| (Bezet)           |                          |           |              |
+-------------------+--------------------------+-----------+--------------+
| Calculated P Axis | 70                       | degrees   | [facility]     |
+-------------------+--------------------------+-----------+--------------+
| Calculated R Axis | -38                      | degrees   | [facility]     |
+-------------------+--------------------------+-----------+--------------+
| Calculated T Axis | 9                        | degrees   | [facility]     |
+-------------------+--------------------------+-----------+--------------+
| Diagnosis         | Normal sinus             |           | [facility] E[facility]     |
|                   | rhythmPossible Left      |           |              |
|                   | atrial enlargementLeft   |           |              |
|                   | axis                     |           |              |
|                   | deviationNon-specific    |           |              |
|                   | intra-ventricular        |           |              |
|                   | conduction blockCannot   |           |              |
|                   | rule out Septal infarct  |           |              |
|                   | , age                    |           |              |
|                   | undeterminedAbnormal     |           |              |
|                   | ECGWhen compared with    |           |              |
|                   | ECG of 2019       |           |              |
|                   | 05:50,Minimal criteria   |           |              |
|                   | for Septal infarct are   |           |              |
|                   | now PresentConfirmed by  |           |              |
|                   | EN FRANK ([id number]) on   |           |              |
|                   | 2019 12:03:10 PM    |           |              |
+-------------------+--------------------------+-----------+--------------+
 
 
 
+--------------+-------------------+--------------------+--------------+
| Performing   | Address           | City/State[facility] | Phone Number |
| Organization |                   |                    |              |
+--------------+-------------------+--------------------+--------------+
|   [facility]   |   [address] | [place] [address] |              |
+--------------+-------------------+--------------------+--------------+
 POCT glucose (2019  5:21 AM)
 
+--------------------+--------------------------+---------------+-----------------+
 
| Component          | Value                    | Ref Range     | Performed At    |
+--------------------+--------------------------+---------------+-----------------+
| GLUCOSE,POC SCREEN | 142 (H)Comment: Testing  | 65 - 99 mg/dL | [facility] |
|                    | performed at [facility];[address]     |               |                 |
|                    | [address];[place], [place]   |               |                 |
|                    | [address]                    |               |                 |
+--------------------+--------------------------+---------------+-----------------+
 
 
 
+-------------------+------------------+--------------------+--------------+
| Performing        | Address          | City/State/Zipcode | Phone Number |
| Organization      |                  |                    |              |
+-------------------+------------------+--------------------+--------------+
|   [facility] |   [address] | [place] [address] |              |
+-------------------+------------------+--------------------+--------------+
 Basic metabolic panel (2019  4:53 AM)
 
+----------------+--------------------------+-------------------+--------------+
| Component      | Value                    | Ref Range         | Performed At |
+----------------+--------------------------+-------------------+--------------+
| SODIUM         | 140                      | 135 - 145 mmol/L  | [facility]   |
|                |                          |                   | LABORATORY   |
+----------------+--------------------------+-------------------+--------------+
| POTASSIUM      | 4.2                      | 3.5 - 4.9 mmol/L  | [facility]   |
|                |                          |                   | LABORATORY   |
+----------------+--------------------------+-------------------+--------------+
| CHLORIDE       | 101                      | 99 - 109 mmol/L   | [facility]   |
|                |                          |                   | LABORATORY   |
+----------------+--------------------------+-------------------+--------------+
| CO2            | 28                       | 23 - 32 mmol/L    | [facility]   |
|                |                          |                   | LABORATORY   |
+----------------+--------------------------+-------------------+--------------+
| ANION GAP AGAP | 15                       | 5 - 20 mmol/L     | [facility]   |
|                |                          |                   | LABORATORY   |
+----------------+--------------------------+-------------------+--------------+
| GLUCOSE        | 157 (H)                  | 65 - 99 mg/dL     | [facility]   |
|                |                          |                   | LABORATORY   |
+----------------+--------------------------+-------------------+--------------+
| BUN            | 26 (H)                   | 8 - 25 mg/dL      | [facility]   |
|                |                          |                   | LABORATORY   |
+----------------+--------------------------+-------------------+--------------+
| CREATININE     | 6.8 (H)                  | 0.50 - 1.00 mg/dL | [facility]   |
|                |                          |                   | LABORATORY   |
+----------------+--------------------------+-------------------+--------------+
| BUN/CREAT      | 4                        |                   | [facility]   |
|                |                          |                   | LABORATORY   |
+----------------+--------------------------+-------------------+--------------+
| CALCIUM        | 9.3                      | 8.5 - 10.5 mg/dL  | [facility]   |
|                |                          |                   | LABORATORY   |
+----------------+--------------------------+-------------------+--------------+
| EGFR           | 6 (L)Comment: GFR <60:   | >60 mL/min/1.73m2 | [facility]   |
|                | CHRONIC KIDNEY DISEASE,  |                   | LABORATORY   |
|                | IF FOUND OVER A 3 MONTH  |                   |              |
|                | PERIOD.GFR <15: KIDNEY   |                   |              |
|                | FAILURE.FOR       |                   |              |
|                | [employer], MULTIPLY THE  |                   |              |
|                | CALCULATED GFR BY        |                   |              |
|                | 1.210.This eGFR is       |                   |              |
|                | calculated using the     |                   |              |
 
|                | MDRD IDMS traceable      |                   |              |
|                | equation.Testing         |                   |              |
|                | performed at [facility], [address] |                   |              |
|                |  [facility],        |                   |              |
|                | [place]    [address]   |                   |              |
+----------------+--------------------------+-------------------+--------------+
 
 
 
+----------+
| Specimen |
+----------+
| Blood    |
+----------+
 
 
 
+---------------+-------------------------+---------------------+----------------+
| Performing    | Address                 | City/State/Zipcode  | Phone Number   |
| Organization  |                         |                     |                |
+---------------+-------------------------+---------------------+----------------+
|   [facility]  |   [address]  | [place] [address] |   [phone] |
| LABORATORY    | [address]                   |                     |                |
+---------------+-------------------------+---------------------+----------------+
 CBC w/auto diff (reflex to manual) (2019  4:53 AM)
 
+-----------------+--------------------------+-------------------+--------------+
| Component       | Value                    | Ref Range         | Performed At |
+-----------------+--------------------------+-------------------+--------------+
| WBC             | 3.39 (L)                 | 3.80 - 11.00 K/uL | [facility]   |
|                 |                          |                   | LABORATORY   |
+-----------------+--------------------------+-------------------+--------------+
| RBC             | 2.78 (L)                 | 3.70 - 5.10 M/uL  | [facility]   |
|                 |                          |                   | LABORATORY   |
+-----------------+--------------------------+-------------------+--------------+
| HGB             | 9.5 (L)                  | 11.3 - 15.5 g/dL  | [facility]   |
|                 |                          |                   | LABORATORY   |
+-----------------+--------------------------+-------------------+--------------+
| HCT             | 29.2 (L)                 | 34.0 - 46.0 %     | [facility]   |
|                 |                          |                   | LABORATORY   |
+-----------------+--------------------------+-------------------+--------------+
| MCV             | 104.7 (H)                | 80.0 - 100.0 fl   | [facility]   |
|                 |                          |                   | LABORATORY   |
+-----------------+--------------------------+-------------------+--------------+
| MCH             | 34.0                     | 27.0 - 34.0 pg    | [facility]   |
|                 |                          |                   | LABORATORY   |
+-----------------+--------------------------+-------------------+--------------+
| MCHC            | 32.5                     | 32.0 - 35.5 g/dL  | [facility]   |
|                 |                          |                   | LABORATORY   |
+-----------------+--------------------------+-------------------+--------------+
| RDW SD          | 63.0 (H)                 | 37 - 53 fl        | [facility]   |
|                 |                          |                   | LABORATORY   |
+-----------------+--------------------------+-------------------+--------------+
| PLT             | 125 (L)                  | 150 - 400 K/uL    | [facility]   |
|                 |                          |                   | LABORATORY   |
+-----------------+--------------------------+-------------------+--------------+
| MPV             | 9.4                      | fl                | [facility]   |
|                 |                          |                   | LABORATORY   |
+-----------------+--------------------------+-------------------+--------------+
| DIFF TYPE       | AUTOMATED                |                   | [facility]   |
 
|                 |                          |                   | LABORATORY   |
+-----------------+--------------------------+-------------------+--------------+
| NEUTROPHILS     | 66.95                    | %                 | [facility]   |
|                 |                          |                   | LABORATORY   |
+-----------------+--------------------------+-------------------+--------------+
| LYMPHOCYTES     | 20.34                    | %                 | [facility]   |
|                 |                          |                   | LABORATORY   |
+-----------------+--------------------------+-------------------+--------------+
| MONOCYTES       | 11.96                    | %                 | [facility]   |
|                 |                          |                   | LABORATORY   |
+-----------------+--------------------------+-------------------+--------------+
| EOSINOPHILS     | 0.04                     | %                 | [facility]   |
|                 |                          |                   | LABORATORY   |
+-----------------+--------------------------+-------------------+--------------+
| BASOPHILS       | 0.71                     | %                 | [facility]   |
|                 |                          |                   | LABORATORY   |
+-----------------+--------------------------+-------------------+--------------+
| NEUTROPHILS ABS | 2.27                     | 1.90 - 7.40 K/uL  | [facility]   |
|                 |                          |                   | LABORATORY   |
+-----------------+--------------------------+-------------------+--------------+
| LYMPHOCYTES ABS | 0.69 (L)                 | 1.00 - 3.90 K/uL  | [facility]   |
|                 |                          |                   | LABORATORY   |
+-----------------+--------------------------+-------------------+--------------+
| MONOCYTES ABS   | 0.41                     | 0.00 - 0.80 K/uL  | [facility]   |
|                 |                          |                   | LABORATORY   |
+-----------------+--------------------------+-------------------+--------------+
| EOSINOPHILS ABS | 0.00                     | 0.00 - 0.50 K/uL  | [facility]   |
|                 |                          |                   | LABORATORY   |
+-----------------+--------------------------+-------------------+--------------+
| BASOPHILS ABS   | 0.02Comment: Testing     | 0.00 - 0.10 K/uL  | [facility]   |
|                 | performed at [facility], [address] |                   | LABORATORY   |
|                 |  [address],        |                   |              |
|                 | [place]  [address]     |                   |              |
+-----------------+--------------------------+-------------------+--------------+
 
 
 
+----------+
| Specimen |
+----------+
| Blood    |
+----------+
 
 
 
+---------------+-------------------------+---------------------+----------------+
| Performing    | Address                 | City/State/Zipcode  | Phone Number   |
| Organization  |                         |                     |                |
+---------------+-------------------------+---------------------+----------------+
|   [facility]  |   [address]  | [place] [address] |   [phone] |
| LABORATORY    | [address]                   |                     |                |
+---------------+-------------------------+---------------------+----------------+
 POCT glucose (2019  9:00 PM)
 
+--------------------+--------------------------+---------------+-----------------+
| Component          | Value                    | Ref Range     | Performed At    |
+--------------------+--------------------------+---------------+-----------------+
| GLUCOSE,POC SCREEN | 148 (H)Comment: Testing  | 65 - 99 mg/dL | [facility] |
|                    | performed at [facility];[address]     |               |                 |
|                    | [address];[place]   |               |                 |
 
|                    | [id number]                    |               |                 |
+--------------------+--------------------------+---------------+-----------------+
 
 
 
+-------------------+------------------+--------------------+--------------+
| Performing        | Address          | City/State/Zipcode | Phone Number |
| Organization      |                  |                    |              |
+-------------------+------------------+--------------------+--------------+
|   [facility] |   [address] | [place] [address] |              |
+-------------------+------------------+--------------------+--------------+
 IR stent femoral popliteal (2019  6:45 PM)
 
+------------------------------------------------------------------------+--------------+
| Impressions                                                            | Performed At |
+------------------------------------------------------------------------+--------------+
|      1. Aorta and iliac arteries were calcified but without            |   [facility]     |
| significant stenosis.  2. Right SFA with high-grade stenosis           | RADIOLOGY    |
| proximally with good endograft results after angioplasty and stenting. |              |
|   3. 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
| artery to right foot.  4. Right posterior tibial artery was            |              |
| chronically occluded.     Electronically signed by Kirt Mclaughlin MD on    |              |
| 2019 3:50 PM                                                      |              |
+------------------------------------------------------------------------+--------------+
 
 
 
+------------------------------------------------------------------------+--------------+
| Narrative                                                              | Performed At |
+------------------------------------------------------------------------+--------------+
|   LOWER EXTREMITY ARTERIOGRAM, UNILATERAL     PREOPERATIVE             |   [facility]     |
| DIAGNOSIS:    Critical limb ischemia of right lower extremity with     | RADIOLOGY    |
| poorly healing wound of right great toe     POSTOPERATIVE              |              |
| DIAGNOSIS:    Same     PROCEDURE:  1. Moderate conscious sedation  2.  |              |
| Ultrasound guided access of the left common femoral artery  3.         |              |
| Aortogram  4. Selective right common femoral artery catheterization    |              |
| with right leg runoff  5. Right SFA stenting using 6 x 80 mm           |              |
| self-expanding stent  6 Drug eluting balloon angioplasty of right SFA  |              |
| using 4 x 100 mm Lutonix balloon     SURGEON: Kirt Mclaughlin MD              |              |
| ASSISTANT: None     ANESTHESIA: Moderate sedation and local anesthesia |              |
|      Informed consent was obtained from the patient. Continuous        |              |
| cardiac monitoring was performed throughout the procedure.  Conscious  |              |
| sedation was provided by the nursing staff during the procedure under  |              |
| my supervision.  Sedation time: 34 minutes  Medications: 2 mg Versed   |              |
| IV, 50 mcg Fentanyl IV     ESTIMATED BLOOD LOSS: Minimal               |              |
| CONTRAST:    53 mL of Isovue 250     INDICATIONS:    See preoperative  |              |
| history and physical     FINDINGS:    Aorta and iliac arteries         |              |
| calcified but without significant stenosis. Right SFA with high-grade  |              |
| stenosis proximally. After angioplasty and stenting, good angiographic |              |
|  results. Good two-vessel runoff to right foot via right anterior      |              |
| tibial artery and peroneal artery. Right posterior tibial artery was   |              |
| chronically occluded.     DESCRIPTION OF PROCEDURE:    The patient was |              |
|  properly identified and brought to the Cath Lab. The patient was      |              |
| placed supine on the catheter table and patient was given moderate     |              |
| sedation by nursing staff under my supervision. Patient's bilateral    |              |
| groins were then prepped and draped in the usual sterile fashion.      |              |
| Local anesthetic was given to the left groin and the left common       |              |
| femoral artery was accessed under ultrasound guidance using a          |              |
| micropuncture needle. A micropuncture sheath was inserted over a wire  |              |
| and up sized to a 4 French sheath. A Omni flush catheter was then      |              |
 
| inserted into the distal aorta and aortogram was then performed with   |              |
| the findings as described above. The Omni Flush catheter was then used |              |
|  to guide a Glidewire into the right common femoral artery and then    |              |
| the catheter was then advanced over the wire. A right lower extremity  |              |
| runoff was then performed with the findings as described above.        |              |
| Next, an Amplatzer wire was then inserted over the catheter and the 4  |              |
| French sheath was removed. A 6 French destination sheath was then      |              |
| inserted over the wire with the tip of the sheath being placed into    |              |
| the right common femoral artery. A vertebral catheter was inserted     |              |
| over the wire and the wire was then removed. A Glidewire was then      |              |
| placed into the vertebral catheter and used to cross through the       |              |
| stenotic right SFA.    Next, a 260 fix core wire was then inserted     |              |
| over the catheter.    Right SFA was stented using 6 x 80 mm            |              |
| self-expanding stent. Drug eluting balloon angioplasty of the right    |              |
| SFA was then performed using 4 x 100 mm Lutonix balloon.     A final   |              |
| arteriogram was then performed through the sheath. The destination     |              |
| sheath was then removed and a Mynx closure device was then used on the |              |
|  left common femoral artery and hemostasis was ensured. The patient    |              |
| was then taken to the observation unit for further monitoring.         |              |
+------------------------------------------------------------------------+--------------+
 
 
 
+-------------------------------------------------------------------------------------------
--------------------------------------------------------------------------------------------
-----------------------------------+
| Procedure Note                                                                            
                                                                                            
                                   |
+-------------------------------------------------------------------------------------------
--------------------------------------------------------------------------------------------
-----------------------------------+
|   Denny, Rad Results In - 2019  8:40 AM PST  LOWER EXTREMITY ARTERIOGRAM,             
                                                                                            
                                   |
| UNILATERALPREOPERATIVE DIAGNOSIS:  Critical limb ischemia of right lower extremity with   
                                                                                            
                                   |
| poorly healing wound of right great toePOSTOPERATIVE DIAGNOSIS:  SamePROCEDURE:1.         
                                                                                            
                                   |
| Moderate conscious sedation2. Ultrasound guided access of the left common femoral         
                                                                                            
                                   |
| artery3. Aortogram4. Selective right common femoral artery catheterization with right     
                                                                                            
                                   |
| leg runoff5. Right SFA stenting using 6 x 80 mm self-expanding stent6 Drug eluting        
                                                                                            
                                   |
| balloon angioplasty of right SFA using 4 x 100 mm Lutonix balloonSURGEON: Kirt Mclaughlin,        
                                                                                            
                                   |
| MDASSISTANT: NoneANESTHESIA: Moderate sedation and local anesthesiaInformed consent was   
                                                                                            
                                   |
| obtained from the patient. Continuous cardiac monitoring was performed throughout the     
                                                                                            
                                   |
| procedure.Conscious sedation was provided by the nursing staff during the procedure       
 
                                                                                            
                                   |
| under my supervision.Sedation time: 34 minutesMedications: 2 mg Versed IV, 50 mcg         
                                                                                            
                                   |
| Fentanyl IVESTIMATED BLOOD LOSS: MinimalCONTRAST:  53 mL of Isovue 250INDICATIONS:  See   
                                                                                            
                                   |
| preoperative history and physicalFINDINGS:  Aorta and iliac arteries calcified but        
                                                                                            
                                   |
| without significant stenosis. Right SFA with high-grade stenosis proximally. After        
                                                                                            
                                   |
| angioplasty and stenting, good angiographic results. Good two-vessel runoff to right      
                                                                                            
                                   |
| foot via right anterior tibial artery and peroneal artery. Right posterior tibial artery  
                                                                                            
                                   |
|  was chronically occluded.DESCRIPTION OF PROCEDURE:  The patient was properly identified  
                                                                                            
                                   |
|  and brought to the Cath Lab. The patient was placed supine on the catheter table and     
                                                                                            
                                   |
| patient was given moderate sedation by nursing staff under my supervision. Patient's      
                                                                                            
                                   |
| bilateral groins were then prepped and draped in the usual sterile fashion. Local         
                                                                                            
                                   |
| anesthetic was given to the left groin and the left common femoral artery was accessed    
                                                                                            
                                   |
| under ultrasound guidance using a micropuncture needle. A micropuncture sheath was        
                                                                                            
                                   |
| inserted over a wire and up sized to a 4 French sheath. A Omni flush catheter was then    
                                                                                            
                                   |
| inserted into the distal aorta and aortogram was then performed with the findings as      
                                                                                            
                                   |
| described above. The Omni Flush catheter was then used to guide a Glidewire into the      
                                                                                            
                                   |
| right common femoral artery and then the catheter was then advanced over the wire. A      
                                                                                            
                                   |
| right lower extremity runoff was then performed with the findings as described            
                                                                                            
                                   |
| above.Next, an Amplatzer wire was then inserted over the catheter and the 4 French        
                                                                                            
                                   |
| sheath was removed. A 6 French destination sheath was then inserted over the wire with    
                                                                                            
                                   |
| the tip of the sheath being placed into the right common femoral artery. A vertebral      
 
                                                                                            
                                   |
| catheter was inserted over the wire and the wire was then removed. A Glidewire was then   
                                                                                            
                                   |
| placed into the vertebral catheter and used to cross through the stenotic right SFA.      
                                                                                            
                                   |
| Next, a 260 fix core wire was then inserted over the catheter.  Right SFA was stented     
                                                                                            
                                   |
| using 6 x 80 mm self-expanding stent. Drug eluting balloon angioplasty of the right SFA   
                                                                                            
                                   |
| was then performed using 4 x 100 mm Lutonix balloon.A final arteriogram was then          
                                                                                            
                                   |
| performed through the sheath. The destination sheath was then removed and a Mynx closure  
                                                                                            
                                   |
|  device was then used on the left common femoral artery and hemostasis was ensured. The   
                                                                                            
                                   |
| patient was then taken to the observation unit for further monitoring.IMPRESSION:1.       
                                                                                            
                                   |
| Aorta and iliac arteries were calcified but without significant stenosis.2. Right SFA     
                                                                                            
                                   |
| with high-grade stenosis proximally with good endograft results after angioplasty and     
                                                                                            
                                   |
| stenting.3. Two-vessel runoff via right anterior tibial artery and peroneal artery to     
                                                                                            
                                   |
| right foot.4. 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       
                                                                                            
                                   |
| signed by Kirt Mclaughlin MD on 2019 3:50 PM                                              
                                                                                            
                                   |
|                                                                                           
                                                                                            
                                   |
|A final arteriogram was then performed through the sheath. The destination sheath was then 
removed and a Mynx closure device was then used on the left common femoral artery and hemost
asis was ensured. The patient was  |
|then taken to the observation unit for further monitoring.                                 
                                                                                            
                                   |
|                                                                                           
                                                                                            
                                   |
|IMPRESSION:                                                                                
                                                                                            
                                  |
|                                                                                           
                                                                                            
                                   |
|1. Aorta and iliac arteries were calcified but without significant stenosis.               
 
                                                                                            
                                   |
|2. Right SFA with high-grade stenosis proximally with good endograft results after angiopla
sty and stenting.                                                                           
                                   |
|3. Two-vessel runoff via right anterior tibial artery and peroneal artery to right foot.   
                                                                                            
                                   |
|4. 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                                 
                                                                                            
                                   |
|                                                                                           
                                                                                            
                                   |
|Electronically signed by Kirt Mclaughlin MD on 2019 3:50 PM                                
                                                                                            
                                   |
+-------------------------------------------------------------------------------------------
--------------------------------------------------------------------------------------------
-----------------------------------+
 
 
 
+--------------------+------------------+--------------------+--------------+
| Performing         | Address          | City/State[facility] | Phone Number |
| Organization       |                  |                    |              |
+--------------------+------------------+--------------------+--------------+
|   [facility] |   [address] | [place] [address] |              |
+--------------------+------------------+--------------------+--------------+
 IR aortagram abdominal (2019  6:45 PM)
 
+------------------------------------------------------------------------+--------------+
| Impressions                                                            | Performed At |
+------------------------------------------------------------------------+--------------+
|      1. Aorta and iliac arteries were calcified but without            |   [facility]     |
| significant stenosis.  2. Right SFA with high-grade stenosis           | RADIOLOGY    |
| proximally with good endograft results after angioplasty and stenting. |              |
|   3. 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
| artery to right foot.  4. Right posterior tibial artery was            |              |
| chronically occluded.     Electronically signed by Kirt Mclaughlin MD on    |              |
| 2019 3:50 PM                                                      |              |
+------------------------------------------------------------------------+--------------+
 
 
 
+------------------------------------------------------------------------+--------------+
| Narrative                                                              | Performed At |
+------------------------------------------------------------------------+--------------+
|   LOWER EXTREMITY ARTERIOGRAM, UNILATERAL     PREOPERATIVE             |   [facility]     |
| DIAGNOSIS:    Critical limb ischemia of right lower extremity with     | RADIOLOGY    |
| poorly healing wound of right great toe     POSTOPERATIVE              |              |
| DIAGNOSIS:    Same     PROCEDURE:  1. Moderate conscious sedation  2.  |              |
| Ultrasound guided access of the left common femoral artery  3.         |              |
| Aortogram  4. Selective right common femoral artery catheterization    |              |
| with right leg runoff  5. Right SFA stenting using 6 x 80 mm           |              |
| self-expanding stent  6 Drug eluting balloon angioplasty of right SFA  |              |
| using 4 x 100 mm Lutonix balloon     SURGEON: Kirt Mclaughlin MD              |              |
| ASSISTANT: None     ANESTHESIA: Moderate sedation and local anesthesia |              |
|      Informed consent was obtained from the patient. Continuous        |              |
| cardiac monitoring was performed throughout the procedure.  Conscious  |              |
 
| sedation was provided by the nursing staff during the procedure under  |              |
| my supervision.  Sedation time: 34 minutes  Medications: 2 mg Versed   |              |
| IV, 50 mcg Fentanyl IV     ESTIMATED BLOOD LOSS: Minimal               |              |
| CONTRAST:    53 mL of Isovue 250     INDICATIONS:    See preoperative  |              |
| history and physical     FINDINGS:    Aorta and iliac arteries         |              |
| calcified but without significant stenosis. Right SFA with high-grade  |              |
| stenosis proximally. After angioplasty and stenting, good angiographic |              |
|  results. Good two-vessel runoff to right foot via right anterior      |              |
| tibial artery and peroneal artery. Right posterior tibial artery was   |              |
| chronically occluded.     DESCRIPTION OF PROCEDURE:    The patient was |              |
|  properly identified and brought to the Cath Lab. The patient was      |              |
| placed supine on the catheter table and patient was given moderate     |              |
| sedation by nursing staff under my supervision. Patient's bilateral    |              |
| groins were then prepped and draped in the usual sterile fashion.      |              |
| Local anesthetic was given to the left groin and the left common       |              |
| femoral artery was accessed under ultrasound guidance using a          |              |
| micropuncture needle. A micropuncture sheath was inserted over a wire  |              |
| and up sized to a 4 French sheath. A Omni flush catheter was then      |              |
| inserted into the distal aorta and aortogram was then performed with   |              |
| the findings as described above. The Omni Flush catheter was then used |              |
|  to guide a Glidewire into the right common femoral artery and then    |              |
| the catheter was then advanced over the wire. A right lower extremity  |              |
| runoff was then performed with the findings as described above.        |              |
| Next, an Amplatzer wire was then inserted over the catheter and the 4  |              |
| French sheath was removed. A 6 French destination sheath was then      |              |
| inserted over the wire with the tip of the sheath being placed into    |              |
| the right common femoral artery. A vertebral catheter was inserted     |              |
| over the wire and the wire was then removed. A Glidewire was then      |              |
| placed into the vertebral catheter and used to cross through the       |              |
| stenotic right SFA.    Next, a 260 fix core wire was then inserted     |              |
| over the catheter.    Right SFA was stented using 6 x 80 mm            |              |
| self-expanding stent. Drug eluting balloon angioplasty of the right    |              |
| SFA was then performed using 4 x 100 mm Lutonix balloon.     A final   |              |
| arteriogram was then performed through the sheath. The destination     |              |
| sheath was then removed and a Mynx closure device was then used on the |              |
|  left common femoral artery and hemostasis was ensured. The patient    |              |
| was then taken to the observation unit for further monitoring.         |              |
+------------------------------------------------------------------------+--------------+
 
 
 
+-------------------------------------------------------------------------------------------
--------------------------------------------------------------------------------------------
-----------------------------------+
| Procedure Note                                                                            
                                                                                            
                                   |
+-------------------------------------------------------------------------------------------
--------------------------------------------------------------------------------------------
-----------------------------------+
|   Denny, Rad Results In - 2019  8:40 AM PST  LOWER EXTREMITY ARTERIOGRAM,             
                                                                                            
                                   |
| UNILATERALPREOPERATIVE DIAGNOSIS:  Critical limb ischemia of right lower extremity with   
                                                                                            
                                   |
| poorly healing wound of right great toePOSTOPERATIVE DIAGNOSIS:  SamePROCEDURE:1.         
                                                                                            
                                   |
| Moderate conscious sedation2. Ultrasound guided access of the left common femoral         
 
                                                                                            
                                   |
| artery3. Aortogram4. Selective right common femoral artery catheterization with right     
                                                                                            
                                   |
| leg runoff5. Right SFA stenting using 6 x 80 mm self-expanding stent6 Drug eluting        
                                                                                            
                                   |
| balloon angioplasty of right SFA using 4 x 100 mm Lutonix balloonSURGEON: Kirt Mclaughlin,        
                                                                                            
                                   |
| MDASSISTANT: NoneANESTHESIA: Moderate sedation and local anesthesiaInformed consent was   
                                                                                            
                                   |
| obtained from the patient. Continuous cardiac monitoring was performed throughout the     
                                                                                            
                                   |
| procedure.Conscious sedation was provided by the nursing staff during the procedure       
                                                                                            
                                   |
| under my supervision.Sedation time: 34 minutesMedications: 2 mg Versed IV, 50 mcg         
                                                                                            
                                   |
| Fentanyl IVESTIMATED BLOOD LOSS: MinimalCONTRAST:  53 mL of Isovue 250INDICATIONS:  See   
                                                                                            
                                   |
| preoperative history and physicalFINDINGS:  Aorta and iliac arteries calcified but        
                                                                                            
                                   |
| without significant stenosis. Right SFA with high-grade stenosis proximally. After        
                                                                                            
                                   |
| angioplasty and stenting, good angiographic results. Good two-vessel runoff to right      
                                                                                            
                                   |
| foot via right anterior tibial artery and peroneal artery. Right posterior tibial artery  
                                                                                            
                                   |
|  was chronically occluded.DESCRIPTION OF PROCEDURE:  The patient was properly identified  
                                                                                            
                                   |
|  and brought to the Cath Lab. The patient was placed supine on the catheter table and     
                                                                                            
                                   |
| patient was given moderate sedation by nursing staff under my supervision. Patient's      
                                                                                            
                                   |
| bilateral groins were then prepped and draped in the usual sterile fashion. Local         
                                                                                            
                                   |
| anesthetic was given to the left groin and the left common femoral artery was accessed    
                                                                                            
                                   |
| under ultrasound guidance using a micropuncture needle. A micropuncture sheath was        
                                                                                            
                                   |
| inserted over a wire and up sized to a 4 French sheath. A Omni flush catheter was then    
                                                                                            
                                   |
| inserted into the distal aorta and aortogram was then performed with the findings as      
 
                                                                                            
                                   |
| described above. The Omni Flush catheter was then used to guide a Glidewire into the      
                                                                                            
                                   |
| right common femoral artery and then the catheter was then advanced over the wire. A      
                                                                                            
                                   |
| right lower extremity runoff was then performed with the findings as described            
                                                                                            
                                   |
| above.Next, an Amplatzer wire was then inserted over the catheter and the 4 French        
                                                                                            
                                   |
| sheath was removed. A 6 French destination sheath was then inserted over the wire with    
                                                                                            
                                   |
| the tip of the sheath being placed into the right common femoral artery. A vertebral      
                                                                                            
                                   |
| catheter was inserted over the wire and the wire was then removed. A Glidewire was then   
                                                                                            
                                   |
| placed into the vertebral catheter and used to cross through the stenotic right SFA.      
                                                                                            
                                   |
| Next, a 260 fix core wire was then inserted over the catheter.  Right SFA was stented     
                                                                                            
                                   |
| using 6 x 80 mm self-expanding stent. Drug eluting balloon angioplasty of the right SFA   
                                                                                            
                                   |
| was then performed using 4 x 100 mm Lutonix balloon.A final arteriogram was then          
                                                                                            
                                   |
| performed through the sheath. The destination sheath was then removed and a Mynx closure  
                                                                                            
                                   |
|  device was then used on the left common femoral artery and hemostasis was ensured. The   
                                                                                            
                                   |
| patient was then taken to the observation unit for further monitoring.IMPRESSION:1.       
                                                                                            
                                   |
| Aorta and iliac arteries were calcified but without significant stenosis.2. Right SFA     
                                                                                            
                                   |
| with high-grade stenosis proximally with good endograft results after angioplasty and     
                                                                                            
                                   |
| stenting.3. Two-vessel runoff via right anterior tibial artery and peroneal artery to     
                                                                                            
                                   |
| right foot.4. 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       
                                                                                            
                                   |
| signed by Kirt Mclaughlin MD on 2019 3:50 PM                                              
                                                                                            
                                   |
|                                                                                           
 
                                                                                            
                                   |
|A final arteriogram was then performed through the sheath. The destination sheath was then 
removed and a Mynx closure device was then used on the left common femoral artery and hemost
asis was ensured. The patient was  |
|then taken to the observation unit for further monitoring.                                 
                                                                                            
                                   |
|                                                                                           
                                                                                            
                                   |
|IMPRESSION:                                                                                
                                                                                            
                                  |
|                                                                                           
                                                                                            
                                   |
|1. Aorta and iliac arteries were calcified but without significant stenosis.               
                                                                                            
                                   |
|2. Right SFA with high-grade stenosis proximally with good endograft results after angiopla
sty and stenting.                                                                           
                                   |
|3. Two-vessel runoff via right anterior tibial artery and peroneal artery to right foot.   
                                                                                            
                                   |
|4. 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                                 
                                                                                            
                                   |
|                                                                                           
                                                                                            
                                   |
|Electronically signed by Kirt Mclaughlin MD on 2019 3:50 PM                                
                                                                                            
                                   |
+-------------------------------------------------------------------------------------------
--------------------------------------------------------------------------------------------
-----------------------------------+
 
 
 
+--------------------+------------------+--------------------+--------------+
| Performing         | Address          | City/State/Zipcode | Phone Number |
| Organization       |                  |                    |              |
+--------------------+------------------+--------------------+--------------+
|   [facility] |   [address] | [place] [address] |              |
+--------------------+------------------+--------------------+--------------+
 IR angiogram extremity right (2019  6:45 PM)
 
+------------------------------------------------------------------------+--------------+
| Impressions                                                            | Performed At |
+------------------------------------------------------------------------+--------------+
|      1. Aorta and iliac arteries were calcified but without            |   [facility]     |
| significant stenosis.  2. Right SFA with high-grade stenosis           | RADIOLOGY    |
| proximally with good endograft results after angioplasty and stenting. |              |
|   3. 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
| artery to right foot.  4. Right posterior tibial artery was            |              |
| chronically occluded.     Electronically signed by Kirt Mclaughlin MD on    |              |
| 2019 3:50 PM                                                      |              |
+------------------------------------------------------------------------+--------------+
 
 
 
 
+------------------------------------------------------------------------+--------------+
| Narrative                                                              | Performed At |
+------------------------------------------------------------------------+--------------+
|   LOWER EXTREMITY ARTERIOGRAM, UNILATERAL     PREOPERATIVE             |   [facility]     |
| DIAGNOSIS:    Critical limb ischemia of right lower extremity with     | RADIOLOGY    |
| poorly healing wound of right great toe     POSTOPERATIVE              |              |
| DIAGNOSIS:    Same     PROCEDURE:  1. Moderate conscious sedation  2.  |              |
| Ultrasound guided access of the left common femoral artery  3.         |              |
| Aortogram  4. Selective right common femoral artery catheterization    |              |
| with right leg runoff  5. Right SFA stenting using 6 x 80 mm           |              |
| self-expanding stent  6 Drug eluting balloon angioplasty of right SFA  |              |
| using 4 x 100 mm Lutonix balloon     SURGEON: Kirt Mclaughlin MD              |              |
| ASSISTANT: None     ANESTHESIA: Moderate sedation and local anesthesia |              |
|      Informed consent was obtained from the patient. Continuous        |              |
| cardiac monitoring was performed throughout the procedure.  Conscious  |              |
| sedation was provided by the nursing staff during the procedure under  |              |
| my supervision.  Sedation time: 34 minutes  Medications: 2 mg Versed   |              |
| IV, 50 mcg Fentanyl IV     ESTIMATED BLOOD LOSS: Minimal               |              |
| CONTRAST:    53 mL of Isovue 250     INDICATIONS:    See preoperative  |              |
| history and physical     FINDINGS:    Aorta and iliac arteries         |              |
| calcified but without significant stenosis. Right SFA with high-grade  |              |
| stenosis proximally. After angioplasty and stenting, good angiographic |              |
|  results. Good two-vessel runoff to right foot via right anterior      |              |
| tibial artery and peroneal artery. Right posterior tibial artery was   |              |
| chronically occluded.     DESCRIPTION OF PROCEDURE:    The patient was |              |
|  properly identified and brought to the Cath Lab. The patient was      |              |
| placed supine on the catheter table and patient was given moderate     |              |
| sedation by nursing staff under my supervision. Patient's bilateral    |              |
| groins were then prepped and draped in the usual sterile fashion.      |              |
| Local anesthetic was given to the left groin and the left common       |              |
| femoral artery was accessed under ultrasound guidance using a          |              |
| micropuncture needle. A micropuncture sheath was inserted over a wire  |              |
| and up sized to a 4 French sheath. A Omni flush catheter was then      |              |
| inserted into the distal aorta and aortogram was then performed with   |              |
| the findings as described above. The Omni Flush catheter was then used |              |
|  to guide a Glidewire into the right common femoral artery and then    |              |
| the catheter was then advanced over the wire. A right lower extremity  |              |
| runoff was then performed with the findings as described above.        |              |
| Next, an Amplatzer wire was then inserted over the catheter and the 4  |              |
| French sheath was removed. A 6 French destination sheath was then      |              |
| inserted over the wire with the tip of the sheath being placed into    |              |
| the right common femoral artery. A vertebral catheter was inserted     |              |
| over the wire and the wire was then removed. A Glidewire was then      |              |
| placed into the vertebral catheter and used to cross through the       |              |
| stenotic right SFA.    Next, a 260 fix core wire was then inserted     |              |
| over the catheter.    Right SFA was stented using 6 x 80 mm            |              |
| self-expanding stent. Drug eluting balloon angioplasty of the right    |              |
| SFA was then performed using 4 x 100 mm Lutonix balloon.     A final   |              |
| arteriogram was then performed through the sheath. The destination     |              |
| sheath was then removed and a Mynx closure device was then used on the |              |
|  left common femoral artery and hemostasis was ensured. The patient    |              |
| was then taken to the observation unit for further monitoring.         |              |
+------------------------------------------------------------------------+--------------+
 
 
 
+-------------------------------------------------------------------------------------------
 
--------------------------------------------------------------------------------------------
-----------------------------------+
| Procedure Note                                                                            
                                                                                            
                                   |
+-------------------------------------------------------------------------------------------
--------------------------------------------------------------------------------------------
-----------------------------------+
|   Lauro Baldwin Results In - 2019  8:40 AM PST  LOWER EXTREMITY ARTERIOGRAM,             
                                                                                            
                                   |
| UNILATERALPREOPERATIVE DIAGNOSIS:  Critical limb ischemia of right lower extremity with   
                                                                                            
                                   |
| poorly healing wound of right great toePOSTOPERATIVE DIAGNOSIS:  SamePROCEDURE:1.         
                                                                                            
                                   |
| Moderate conscious sedation2. Ultrasound guided access of the left common femoral         
                                                                                            
                                   |
| artery3. Aortogram4. Selective right common femoral artery catheterization with right     
                                                                                            
                                   |
| leg runoff5. Right SFA stenting using 6 x 80 mm self-expanding stent6 Drug eluting        
                                                                                            
                                   |
| balloon angioplasty of right SFA using 4 x 100 mm Lutonix balloonSURGEON: Kirt Mclaughlin,        
                                                                                            
                                   |
| MDASSISTANT: NoneANESTHESIA: Moderate sedation and local anesthesiaInformed consent was   
                                                                                            
                                   |
| obtained from the patient. Continuous cardiac monitoring was performed throughout the     
                                                                                            
                                   |
| procedure.Conscious sedation was provided by the nursing staff during the procedure       
                                                                                            
                                   |
| under my supervision.Sedation time: 34 minutesMedications: 2 mg Versed IV, 50 mcg         
                                                                                            
                                   |
| Fentanyl IVESTIMATED BLOOD LOSS: MinimalCONTRAST:  53 mL of Isovue 250INDICATIONS:  See   
                                                                                            
                                   |
| preoperative history and physicalFINDINGS:  Aorta and iliac arteries calcified but        
                                                                                            
                                   |
| without significant stenosis. Right SFA with high-grade stenosis proximally. After        
                                                                                            
                                   |
| angioplasty and stenting, good angiographic results. Good two-vessel runoff to right      
                                                                                            
                                   |
| foot via right anterior tibial artery and peroneal artery. Right posterior tibial artery  
                                                                                            
                                   |
|  was chronically occluded.DESCRIPTION OF PROCEDURE:  The patient was properly identified  
                                                                                            
                                   |
|  and brought to the Cath Lab. The patient was placed supine on the catheter table and     
 
                                                                                            
                                   |
| patient was given moderate sedation by nursing staff under my supervision. Patient's      
                                                                                            
                                   |
| bilateral groins were then prepped and draped in the usual sterile fashion. Local         
                                                                                            
                                   |
| anesthetic was given to the left groin and the left common femoral artery was accessed    
                                                                                            
                                   |
| under ultrasound guidance using a micropuncture needle. A micropuncture sheath was        
                                                                                            
                                   |
| inserted over a wire and up sized to a 4 French sheath. A Omni flush catheter was then    
                                                                                            
                                   |
| inserted into the distal aorta and aortogram was then performed with the findings as      
                                                                                            
                                   |
| described above. The Omni Flush catheter was then used to guide a Glidewire into the      
                                                                                            
                                   |
| right common femoral artery and then the catheter was then advanced over the wire. A      
                                                                                            
                                   |
| right lower extremity runoff was then performed with the findings as described            
                                                                                            
                                   |
| above.Next, an Amplatzer wire was then inserted over the catheter and the 4 French        
                                                                                            
                                   |
| sheath was removed. A 6 French destination sheath was then inserted over the wire with    
                                                                                            
                                   |
| the tip of the sheath being placed into the right common femoral artery. A vertebral      
                                                                                            
                                   |
| catheter was inserted over the wire and the wire was then removed. A Glidewire was then   
                                                                                            
                                   |
| placed into the vertebral catheter and used to cross through the stenotic right SFA.      
                                                                                            
                                   |
| Next, a 260 fix core wire was then inserted over the catheter.  Right SFA was stented     
                                                                                            
                                   |
| using 6 x 80 mm self-expanding stent. Drug eluting balloon angioplasty of the right SFA   
                                                                                            
                                   |
| was then performed using 4 x 100 mm Lutonix balloon.A final arteriogram was then          
                                                                                            
                                   |
| performed through the sheath. The destination sheath was then removed and a Mynx closure  
                                                                                            
                                   |
|  device was then used on the left common femoral artery and hemostasis was ensured. The   
                                                                                            
                                   |
| patient was then taken to the observation unit for further monitoring.IMPRESSION:1.       
 
                                                                                            
                                   |
| Aorta and iliac arteries were calcified but without significant stenosis.2. Right SFA     
                                                                                            
                                   |
| with high-grade stenosis proximally with good endograft results after angioplasty and     
                                                                                            
                                   |
| stenting.3. Two-vessel runoff via right anterior tibial artery and peroneal artery to     
                                                                                            
                                   |
| right foot.4. 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       
                                                                                            
                                   |
| signed by Kirt Mclaughlin MD on 2019 3:50 PM                                              
                                                                                            
                                   |
|                                                                                           
                                                                                            
                                   |
|A final arteriogram was then performed through the sheath. The destination sheath was then 
removed and a Mynx closure device was then used on the left common femoral artery and hemost
asis was ensured. The patient was  |
|then taken to the observation unit for further monitoring.                                 
                                                                                            
                                   |
|                                                                                           
                                                                                            
                                   |
|IMPRESSION:                                                                                
                                                                                            
                                  |
|                                                                                           
                                                                                            
                                   |
|1. Aorta and iliac arteries were calcified but without significant stenosis.               
                                                                                            
                                   |
|2. Right SFA with high-grade stenosis proximally with good endograft results after angiopla
sty and stenting.                                                                           
                                   |
|3. Two-vessel runoff via right anterior tibial artery and peroneal artery to right foot.   
                                                                                            
                                   |
|4. 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                                 
                                                                                            
                                   |
|                                                                                           
                                                                                            
                                   |
|Electronically signed by Kirt Mclaughlin MD on 2019 3:50 PM                                
                                                                                            
                                   |
+-------------------------------------------------------------------------------------------
--------------------------------------------------------------------------------------------
-----------------------------------+
 
 
 
+--------------------+------------------+--------------------+--------------+
 
| Performing         | Address          | City/State/Zipcode | Phone Number |
| Organization       |                  |                    |              |
+--------------------+------------------+--------------------+--------------+
|   [facility] |   [address] | [place] [address] |              |
+--------------------+------------------+--------------------+--------------+
 Blood Culture Set 2 (2019  6:05 PM)
 
+----------------------+------------------------+-----------+--------------+
| Component            | Value                  | Ref Range | Performed At |
+----------------------+------------------------+-----------+--------------+
| Specimen Description | BLOOD, PERIPHERAL DRAW |           | [facility]   |
|                      |                        |           | LABORATORY   |
+----------------------+------------------------+-----------+--------------+
| CULTURE              | NO GROWTH 6 DAYS       |           | [facility]   |
|                      |                        |           | LABORATORY   |
+----------------------+------------------------+-----------+--------------+
 
 
 
+-----------------+
| Specimen        |
+-----------------+
| Blood - Blood,  |
| peripheral draw |
+-----------------+
 
 
 
+---------------+-------------------------+---------------------+----------------+
| Performing    | Address                 | City/State/Zipcode  | Phone Number   |
| Organization  |                         |                     |                |
+---------------+-------------------------+---------------------+----------------+
|   [facility]  |   [address]  | [place] [address] |   [phone] |
| LABORATORY    | [address]                   |                     |                |
+---------------+-------------------------+---------------------+----------------+
 IR guidance vascular access US (2019  5:40 PM)
 
+------------------------------------------------------------------------+--------------+
| Narrative                                                              | Performed At |
+------------------------------------------------------------------------+--------------+
|   This Point of Care (POC) ultrasound image has been reviewed and      |   [facility]     |
| interpreted by the physician identified as the performing physician in | RADIOLOGY    |
|  the   associated interpretation and report.                           |              |
+------------------------------------------------------------------------+--------------+
 
 
 
+--------------------+------------------+--------------------+--------------+
| Performing         | Address          | City/State/Zipcode | Phone Number |
| Organization       |                  |                    |              |
+--------------------+------------------+--------------------+--------------+
|   [facility] |   [address] | [place] [address] |              |
+--------------------+------------------+--------------------+--------------+
 C-reactive protein (2019  3:43 PM)
 
+-----------+--------------------------+------------+-----------------+
| Component | Value                    | Ref Range  | Performed At    |
+-----------+--------------------------+------------+-----------------+
| CRP       | 0.6 (H)Comment: Testing  | <0.5 mg/dL | [facility] |
|           | performed at [facility];[address]     |            |                 |
 
|           | [address];[place]   |            |                 |
|           | [id number]                    |            |                 |
+-----------+--------------------------+------------+-----------------+
 
 
 
+-------------------+
| Specimen          |
+-------------------+
| Blood - Arm, Left |
+-------------------+
 
 
 
+-------------------+------------------+--------------------+--------------+
| Performing        | Address          | City/State/Zipcode | Phone Number |
| Organization      |                  |                    |              |
+-------------------+------------------+--------------------+--------------+
|   [facility] |   [address] | [place] [address] |              |
+-------------------+------------------+--------------------+--------------+
 Sedimentation rate, automated (2019  3:43 PM)
 
+-----------+-------------------------+--------------+-----------------+
| Component | Value                   | Ref Range    | Performed At    |
+-----------+-------------------------+--------------+-----------------+
| ESR       | 13Comment: Testing      | 0 - 30 mm/Hr | [facility] |
|           | performed at [facility];[address]    |              |                 |
|           | [address];[place]  |              |                 |
|           | [id number]                   |              |                 |
+-----------+-------------------------+--------------+-----------------+
 
 
 
+-------------------+
| Specimen          |
+-------------------+
| Blood - Arm, Left |
+-------------------+
 
 
 
+-------------------+------------------+--------------------+--------------+
| Performing        | Address          | City/State/Zipcode | Phone Number |
| Organization      |                  |                    |              |
+-------------------+------------------+--------------------+--------------+
|   [facility] |   [address] | [place] [address] |              |
+-------------------+------------------+--------------------+--------------+
 CPK (2019  3:43 PM)
 
+-----------+-------------------------+--------------+-----------------+
| Component | Value                   | Ref Range    | Performed At    |
+-----------+-------------------------+--------------+-----------------+
| CPK       | 28 (L)Comment: Testing  | 30 - 240 U/L | [facility] |
|           | performed at [facility];[address]    |              |                 |
|           | [address];[place]  |              |                 |
|           | [id number]                   |              |                 |
+-----------+-------------------------+--------------+-----------------+
 
 
 
 
+-------------------+
| Specimen          |
+-------------------+
| Blood - Arm, Left |
+-------------------+
 
 
 
+-------------------+------------------+--------------------+--------------+
| Performing        | Address          | City/State/Zipcode | Phone Number |
| Organization      |                  |                    |              |
+-------------------+------------------+--------------------+--------------+
|   [facility] |   [address] | [place] [address] |              |
+-------------------+------------------+--------------------+--------------+
 Comprehensive metabolic panel (2019  3:43 PM)
 
+----------------+--------------------------+-------------------+-----------------+
| Component      | Value                    | Ref Range         | Performed At    |
+----------------+--------------------------+-------------------+-----------------+
| SODIUM         | 139                      | 135 - 145 mmol/L  | [facility] |
+----------------+--------------------------+-------------------+-----------------+
| POTASSIUM      | 4.0                      | 3.5 - 4.9 mmol/L  | [facility] |
+----------------+--------------------------+-------------------+-----------------+
| CHLORIDE       | 100                      | 99 - 109 mmol/L   | [facility] |
+----------------+--------------------------+-------------------+-----------------+
| CO2            | 29                       | 23 - 32 mmol/L    | [facility] |
+----------------+--------------------------+-------------------+-----------------+
| ANION GAP AGAP | 14                       | 5 - 20 mmol/L     | [facility] |
+----------------+--------------------------+-------------------+-----------------+
| GLUCOSE        | 202 (H)                  | 65 - 99 mg/dL     | [facility] |
+----------------+--------------------------+-------------------+-----------------+
| BUN            | 23                       | 8 - 25 mg/dL      | [facility] |
+----------------+--------------------------+-------------------+-----------------+
| CREATININE     | 6.1 (H)                  | 0.50 - 1.00 mg/dL | [facility] |
+----------------+--------------------------+-------------------+-----------------+
| BUN/CREAT      | 4                        |                   | [facility] |
+----------------+--------------------------+-------------------+-----------------+
| CALCIUM        | 9.0                      | 8.5 - 10.5 mg/dL  | [facility] |
+----------------+--------------------------+-------------------+-----------------+
| TOTAL PROTEIN  | 6.2 (L)                  | 6.3 - 8.2 g/dL    | [facility] |
+----------------+--------------------------+-------------------+-----------------+
| Albumin        | 2.7 (L)                  | 3.3 - 4.8 g/dL    | [facility] |
+----------------+--------------------------+-------------------+-----------------+
| GLOBULIN       | 3.5                      | 1.3 - 4.9 g/dL    | [facility] |
+----------------+--------------------------+-------------------+-----------------+
| A/G            | 0.8 (L)                  | 1.0 - 2.4         | [facility] |
+----------------+--------------------------+-------------------+-----------------+
| TBIL           | 0.4                      | 0.1 - 1.5 mg/dL   | [facility] |
+----------------+--------------------------+-------------------+-----------------+
| ALK PHOS       | 110                      | 35 - 115 U/L      | [facility] |
+----------------+--------------------------+-------------------+-----------------+
| AST            | 24                       | 10 - 45 U/L       | [facility] |
+----------------+--------------------------+-------------------+-----------------+
| ALT            | 21                       | 10 - 65 U/L       | [facility] |
+----------------+--------------------------+-------------------+-----------------+
| EGFR           | 7 (L)Comment: GFR <60:   | >60 mL/min/1.73m2 | [facility] |
|                | CHRONIC KIDNEY DISEASE,  |                   |                 |
|                | IF FOUND OVER A 3 MONTH  |                   |                 |
|                | PERIOD.GFR <15: KIDNEY   |                   |                 |
|                | FAILURE.FOR       |                   |                 |
 
|                | AMERICANS, MULTIPLY THE  |                   |                 |
|                | CALCULATED GFR BY        |                   |                 |
|                | 1.210.This eGFR is       |                   |                 |
|                | calculated using the     |                   |                 |
|                | MDRD IDMS traceable      |                   |                 |
|                | equation.Testing         |                   |                 |
|                | performed at [facility];[address]     |                   |                 |
|                | [facility];[place], [place]   |                   |                 |
|                | [id number]                    |                   |                 |
+----------------+--------------------------+-------------------+-----------------+
 
 
 
+-------------------+
| Specimen          |
+-------------------+
| Blood - Arm, Left |
+-------------------+
 
 
 
+-------------------+------------------+--------------------+--------------+
| Performing        | Address          | City/State/Zipcode | Phone Number |
| Organization      |                  |                    |              |
+-------------------+------------------+--------------------+--------------+
|   [facility] |   [address] | [place] [address] |              |
+-------------------+------------------+--------------------+--------------+
 Blood Culture Set 1 (2019  3:43 PM)
 
+----------------------+------------------+-----------+-----------------+
| Component            | Value            | Ref Range | Performed At    |
+----------------------+------------------+-----------+-----------------+
| Specimen Description | BLOOD            |           | [facility]      |
|                      |                  |           | LABORATORY      |
+----------------------+------------------+-----------+-----------------+
| SPECIAL REQUESTS     | RAC              |           | [facility] |
+----------------------+------------------+-----------+-----------------+
| CULTURE              | NO GROWTH 6 DAYS |           | [facility]      |
|                      |                  |           | LABORATORY      |
+----------------------+------------------+-----------+-----------------+
 
 
 
+----------------+
| Specimen       |
+----------------+
| Blood - Blood  |
+----------------+
 
 
 
+-------------------+-------------------------+---------------------+----------------+
| Performing        | Address                 | City/State/Zipcode  | Phone Number   |
| Organization      |                         |                     |                |
+-------------------+-------------------------+---------------------+----------------+
|   [facility]      |   [address]  | [place] [address] |   [phone] |
| LABORATORY        | [address]vd[address]                   |                     |                |
+-------------------+-------------------------+---------------------+----------------+
|   [facility] |   [address]        | [place] [address]  |                |
+-------------------+-------------------------+---------------------+----------------+
 
 CBC w/manual diff (2019  3:43 PM)
 
+----------------------+-------------------------+-------------------+-----------------+
| Component            | Value                   | Ref Range         | Performed At    |
+----------------------+-------------------------+-------------------+-----------------+
| WBC                  | 5.53Comment:            | 3.80 - 11.00 K/uL | [facility] |
+----------------------+-------------------------+-------------------+-----------------+
| RBC                  | 2.76 (L)                | 3.70 - 5.10 M/uL  | [facility] |
+----------------------+-------------------------+-------------------+-----------------+
| HGB                  | 9.4 (L)                 | 11.3 - 15.5 g/dL  | [facility] |
+----------------------+-------------------------+-------------------+-----------------+
| HCT                  | 28.4 (L)                | 34.0 - 46.0 %     | [facility] |
+----------------------+-------------------------+-------------------+-----------------+
| MCV                  | 102.9 (H)               | 80.0 - 100.0 fl   | [facility] |
+----------------------+-------------------------+-------------------+-----------------+
| MCH                  | 34.1 (H)                | 27.0 - 34.0 pg    | [facility] |
+----------------------+-------------------------+-------------------+-----------------+
| MCHC                 | 33.2                    | 32.0 - 35.5 g/dL  | [facility] |
+----------------------+-------------------------+-------------------+-----------------+
| RDW SD               | 61.3 (H)                | 37 - 53 fl        | [facility] |
+----------------------+-------------------------+-------------------+-----------------+
| PLT                  | 147 (L)Comment:         | 150 - 400 K/uL    | [facility] |
+----------------------+-------------------------+-------------------+-----------------+
| MPV                  | 9.3Comment:             | fl                | [facility] |
+----------------------+-------------------------+-------------------+-----------------+
| DIFF TYPE            | MANUAL                  |                   | [facility] |
+----------------------+-------------------------+-------------------+-----------------+
| Neutrophils Manual   | 64                      | %                 | [facility] |
+----------------------+-------------------------+-------------------+-----------------+
| Lymphocytes Manual   | 28                      | %                 | [facility] |
+----------------------+-------------------------+-------------------+-----------------+
| Monocytes Manual     | 8                       | %                 | [facility] |
+----------------------+-------------------------+-------------------+-----------------+
| Neutrophils Absolute | 3.54                    | 1.90 - 7.40 K/uL  | [facility] |
+----------------------+-------------------------+-------------------+-----------------+
| Lymphocytes Absolute | 1.55                    | 1.00 - 3.90 K/uL  | [facility] |
+----------------------+-------------------------+-------------------+-----------------+
| Monocytes Absolute   | 0.44                    | 0.00 - 0.80 K/uL  | [facility] |
+----------------------+-------------------------+-------------------+-----------------+
| MORPHOLOGY           | 1+Comment: ANISONORMAL  |                   | [facility] |
|                      | PLT MORPHTesting        |                   |                 |
|                      | performed at [facility]    |                   |                 |
|                      | [address];[place], [place]  |                   |                 |
|                      | [id number]                   |                   |                 |
|                      |                         |                   |                 |
+----------------------+-------------------------+-------------------+-----------------+
 
 
 
+-------------------+
| Specimen          |
+-------------------+
| Blood - Arm, Left |
+-------------------+
 
 
 
+-------------------+------------------+--------------------+--------------+
| Performing        | Address          | City/State/Zipcode | Phone Number |
| Organization      |                  |                    |              |
 
+-------------------+------------------+--------------------+--------------+
|   [facility] |   [address] | [place] [address] |              |
+-------------------+------------------+--------------------+--------------+
 US lower extremty  arterial right (2019  2:23 PM)
 
+------------------------------------------------------------------------+--------------+
| Impressions                                                            | Performed At |
+------------------------------------------------------------------------+--------------+
|   1. Right leg shows biphasic inflow with a greater than 50% stenosis  |   [facility]     |
| at  the origin of the superficial femoral artery                       | RADIOLOGY    |
| (137-309).    Biphasic  outflow.  2. Two vessel runoff to the right    |              |
| foot.  Signed by: Eugenio Hoffman  Sign Date/Time: 2019 3:11 PM  |              |
+------------------------------------------------------------------------+--------------+
 
 
 
+------------------------------------------------------------------------+--------------+
| Narrative                                                              | Performed At |
+------------------------------------------------------------------------+--------------+
|   LOWER EXTREMITY ARTERIAL EXAM WITH IMAGING  CLINICAL INFORMATION:    |   [facility]     |
| The patient is a 68-year-old female presenting to the vascular lab     | RADIOLOGY    |
| with  complaints of right foot nonhealing wound.    We have been asked |              |
|  to  evaluate for peripheral arterial disease.  COMPARISON:  None      |              |
| PROCEDURE:  Imaging of the right lower extremity arteries was          |              |
| performed using both  color Doppler and spectral Doppler techniques    |              |
| with a 9 megahertz linear  array transducer.  FINDINGS:  RIGHT  CFA    |              |
| prox 137 biphasic  DFA prox 71 biphasic  SFA prox 309 biphasic  SFA    |              |
| mid 91 biphasic  SFA distal 127 biphasic  POP mid 96 biphasic  GIL     |              |
| prox 69 biphasic  GIL distal 145 biphasic  PTA prox 50 biphasic  PTA   |              |
| distal 58 biphasic  WINIFRED prox 74 biphasic  WINIFRED distal 0    absent     |              |
+------------------------------------------------------------------------+--------------+
 
 
 
+------------------------------------------------------------------------------------------+
| Procedure Note                                                                           |
+------------------------------------------------------------------------------------------+
|   Lauro Baldwin Results In - 2019  3:14 PM PST  LOWER EXTREMITY ARTERIAL EXAM WITH      |
| IMAGINGCLINICAL INFORMATION:The patient is a 68-year-old female presenting to the        |
| vascular lab withcomplaints of right foot nonhealing wound.  We have been asked          |
| toevaluate for peripheral arterial disease.COMPARISON:NonePROCEDURE:Imaging of the right |
|  lower extremity arteries was performed using bothcolor Doppler and spectral Doppler     |
| techniques with a 9 megahertz lineararray transducer.FINDINGS:RIGHTCFA prox 137          |
| biphasicDFA prox 71 biphasicSFA prox 309 biphasicSFA mid 91 biphasicSFA distal 127       |
| biphasicPOP mid 96 biphasicATA prox 69 biphasicATA distal 145 biphasicPTA prox 50        |
| biphasicPTA distal 58 biphasicPERA prox 74 biphasicPERA distal 0  absentIMPRESSION:1.    |
| Right leg shows biphasic inflow with a greater than 50% stenosis atthe origin of the     |
| superficial femoral artery (137-309).  Biphasicoutflow.2. Two vessel runoff to the right |
|  foot.Signed by: Jamilah Hoffman Date/Time: 2019 3:11 PM                       |
|RIGHT                                                                                    |
|CFA prox 137 biphasic                                                                     |
|DFA prox 71 biphasic                                                                      |
|SFA prox 309 biphasic                                                                     |
|SFA mid 91 biphasic                                                                       |
|SFA distal 127 biphasic                                                                   |
|POP mid 96 biphasic                                                                       |
|GIL prox 69 biphasic                                                                      |
|GIL distal 145 biphasic                                                                   |
|PTA prox 50 biphasic                                                                      |
|PTA distal 58 biphasic                                                                    |
 
|WINIFRED prox 74 biphasic                                                                     |
|WINIFRED distal 0  absent                                                                     |
|IMPRESSION:                                                                              |
|1. Right leg shows biphasic inflow with a greater than 50% stenosis at                    |
|the origin of the superficial femoral artery (137-309).  Biphasic                         |
|outflow.                                                                                 |
|2. Two vessel runoff to the right foot.                                                   |
|Signed by: Eugenio Hoffman                                                                |
|Sign Date/Time: 2019 3:11 PM                                                        |
+------------------------------------------------------------------------------------------+
 
 
 
+--------------------+------------------+--------------------+--------------+
| Performing         | Address          | City/State/Zipcode | Phone Number |
| Organization       |                  |                    |              |
+--------------------+------------------+--------------------+--------------+
|   [facility] |   [address] | [place] [address] |              |
+--------------------+------------------+--------------------+--------------+
 ED INFORMATION EXCHANGE (2019  1:03 PM)
 
+------------------------------------------------------------------------+--------------+
| Narrative                                                              | Performed At |
+------------------------------------------------------------------------+--------------+
|   [id number] [id number]     Upcoming Changes to the [facility]     |   ED         |
| Notification  On 2019 the layout of this notification will | INFORMATION  |
|  be updated. For an overview of upcoming changes, please log into      | EXCHANGE     |
| [email or web address]. For questions,       |              |
| please email [email or web address] or call [phone].     |              |
|  This patient has registered at the [facility]     |              |
| Emergency Department   For more information visit:                     |              |
| [email or web address] |              |
| [id number]   Security Events  No recent Security Events currently on file  |              |
|     ED Care Guidelines from [employer] [facility] [place]  Last Updated: 18 |              |
|  10:16 AM         Other Information:  Currently being seen by Myranda |              |
|  in [place] for mental health concerns.[phone]  These are       |              |
| guidelines and the provider should exercise clinical judgment when     |              |
| providing care.  Additional guidelines are also available online from  |              |
| the following facilities: [facility]   Recent Emergency      |              |
| Department Visit Summary  Date Facility City State Type Major Type     |              |
| Diagnoses or Chief Complaint   2019 [facility]       |              |
| [place] Emergency    Emergency       2019 [facility]    |              |
| [facility] [place] Emergency    Emergency          Chest Pain              |              |
| Nausea        Shortness of Breath        Chest pain, unspecified       |              |
|      Elevated blood-pressure reading, without diagnosis of             |              |
| hypertension        Presence of aortocoronary bypass graft             |              |
| Other specified postprocedural states      2019 [facility]  |              |
| [facility] [place] Emergency    Emergency          CHEST PAIN            |              |
| Abnormal levels of other serum enzymes        Personal history of      |              |
| other diseases of the circulatory system        Chest pain,            |              |
| unspecified      2019 [facility] [place]            |              |
| Emergency    Emergency          FISTULA BLEEDING        Hemorrhage due |              |
|  to vascular prosthetic devices, implants and grafts, initial          |              |
| encounter      Dec 22, 2018 [facility] [place]             |              |
| Emergency    Emergency          CHEST PAIN        Type 2 diabetes      |              |
| mellitus with hyperglycemia        Chest pain, unspecified      Nov    |              |
| 2018 [place] Emergency    Emergency    Chief |              |
|  Complaint: SOB      2018 [facility] [place]       |              |
| Emergency    Emergency          FALL        Unspecified fall, initial  |              |
| encounter        Dependence on renal dialysis        End stage renal   |              |
 
| disease      2018 [facility] [place]               |              |
| Emergency    Emergency          FALL        Essential (primary)        |              |
| hypertension        Type 2 diabetes mellitus with hyperglycemia        |              |
|  Type 2 diabetes mellitus with unspecified complications               |              |
| Unspecified fall, initial encounter        Headache      2018  |              |
| [place] Emergency    Emergency          -1.      |              |
| Dorsalgia, unspecified        -1. Painful micturition, unspecified     |              |
|      0. Frequency of micturition        1. Urinary tract infection,    |              |
| site not specified        6. Type 2 diabetes mellitus with             |              |
| hyperglycemia        7. Type 2 diabetes mellitus with diabetic chronic |              |
|  kidney disease        8. End stage renal disease        9. Dependence |              |
|  on renal dialysis        10. Shortness of breath        11. Long term |              |
|  (current) use of anticoagulants            E.D. Visit Count (12 mo.)  |              |
|  Facility Visits Low Acuity   [facility] 17 0   [facility]        |              |
| [facility] 2 0   [facility] 3 0   Total   |              |
| 22 0   Note: Visits indicate total known visits. Medicaid Low Acuity   |              |
| Dx are the number of primary diagnoses on the Medicaid's Low Acuity dx |              |
|  list.         Recent Inpatient Visit Summary  Date Facility City      |              |
| State Type Major Type Diagnoses or Chief Complaint   Dec 27, 2018      |              |
| [facility]. [place] Recovery    Inpatient                   |              |
|     Peripheral arterial disease, osteomyelitis left foot        Other  |              |
| acute osteomyelitis, left ankle and foot        Long term (current)    |              |
| use of antibiotics        End stage renal disease        Anemia in     |              |
| chronic kidney disease      Dec 5, 2018 [facility] |              |
|  General Medicine    Inpatient          Peripheral vascular disease,   |              |
| unspecified        End stage renal disease        Dependence on renal  |              |
| dialysis        Long term (current) use of insulin        Other        |              |
| chronic osteomyelitis, left ankle and foot        Type 2 diabetes      |              |
| mellitus with diabetic chronic kidney disease        Essential         |              |
| (primary) hypertension      2018 [facility].   |              |
| [place] Inpatient    Inpatient          Upper GIB        Other chronic      |              |
| osteomyelitis, unspecified ankle and foot        End stage renal       |              |
| disease        Other specified personal risk factors, not elsewhere    |              |
| classified        Chronic systolic (congestive) heart failure          |              |
| Anemia in chronic kidney disease        Other disorders of             |              |
| plasma-protein metabolism, not elsewhere classified        Moderate    |              |
| protein-calorie malnutrition        Other disorders of phosphorus      |              |
| metabolism      [facility] [place] [place] [facility]     |              |
| [facility]    Inpatient          1. Type 2 diabetes mellitus with other  |              |
| specified complication        2. Urinary tract infection, site not     |              |
| specified        3. Unspecified protein-calorie malnutrition        4. |              |
|  Other chronic osteomyelitis, unspecified site        5. Hypertensive  |              |
| heart and chronic kidney disease with heart failure and with stage 5   |              |
| chronic kidney disease, or end stage renal disease        6. Chronic   |              |
| diastolic (congestive) heart failure        7. Other osteomyelitis,    |              |
| ankle and foot        8. Type 2 diabetes mellitus with foot ulcer      |              |
|      9. Atherosclerotic heart disease of native coronary artery        |              |
| without angina pectoris        10. Chronic obstructive pulmonary       |              |
| disease, unspecified      2018 [facility] [place]    |              |
| Medical Surgical    Inpatient          1. Benign paroxysmal vertigo,   |              |
| unspecified ear        2. End stage renal disease        3.            |              |
| Hypertensive chronic kidney disease with stage 5 chronic kidney        |              |
| disease or end stage renal disease        4. Urinary tract infection,  |              |
| site not specified        5. Hypertensive heart and chronic kidney     |              |
| disease with heart failure and with stage 5 chronic kidney disease, or |              |
|  end stage renal disease        6. Kidney transplant status        7.  |              |
| Unspecified systolic (congestive) heart failure        8. Syncope and  |              |
| collapse        9. Type 2 diabetes mellitus with diabetic chronic      |              |
| kidney disease        10. Atherosclerotic heart disease of native      |              |
| coronary artery without angina pectoris            Prescription Drug   |              |
 
| Report (12 Mo.)  PDMP query found no report.     Care Providers        |              |
| Provider PRC Type Phone Fax Service Dates   HEADINGS, SANTIAGO, F.N.P.     |              |
| Nurse Practitioner: Family     Current      [facility] Case        |              |
| Manager/Care Coordinator [phone]    2019 - Current       |              |
| [facility] [phone]    2019 -            |              |
| Current      [facility]    ([phone])       |              |
| [phone] Current      [facility] Primary     |              |
| Care     Current      [facility] GONZALEZ Primary Care     Current            |              |
| [email or web address] Provider [phone]     |              |
| Current      [facility] Case or Care Manager     Current      [facility]   |              |
| MIRTA Sr Case or Care Manager [phone]     |              |
| [phone] Current      Jairo Gutierrez MD Other     Current           |              |
| Known Aliases  No known aliases.   Criteria Met         Care           |              |
| Guidelines      10 in 12      3 in 60     The above information is     |              |
| provided for the sole purpose of patient treatment. Use of this        |              |
| information beyond the terms of Data Sharing Memorandum of             |              |
| Understanding and License Agreement is prohibited. In certain cases    |              |
| not all visits may be represented.   Consult the aforementioned        |              |
| facilities for additional information.   2019 [employer]       |              |
| [employer] [facility] [place], [place] -                              |              |
| [email or web address]                                         |              |
+------------------------------------------------------------------------+--------------+
 
 
 
+-------------------------------------------------------------------------------------------
--------------------------------------------------------------------------------------------
--------------------+
| Procedure Note                                                                            
                                                                                            
                    |
+-------------------------------------------------------------------------------------------
--------------------------------------------------------------------------------------------
--------------------+
|   Interface, Lab - 2019  1:04 PM PST  Formatting of this note may be different      
                                                                                            
                    |
| from the original.[id number] [id number] Changes to the Arnie             
                                                                                            
                    |
| NotificationOn 2019 the layout of this notification will be updated. For an   
                                                                                            
                    |
| overview of upcoming changes, please log into                                             
                                                                                            
                    |
| [email or web address]. For questions, please email             
                                                                                            
                    |
| [email or web address] or call [phone].This patient has registered at the   
                                                                                            
                    |
| [facility] Emergency Department For more information visit:           
                                                                                            
                    |
| [email or web address] Security     
                                                                                            
                    |
| EventsNo recent Security Events currently on fileED Care Guidelines from [employer] -       
                                                                                            
 
                    |
| [place]illaLast Updated: 18 10:16 AM  Other Information:Currently being seen by         
                                                                                            
                    |
| [facility] in [place] for mental health concerns.[phone] are guidelines and     
                                                                                            
                    |
| the provider should exercise clinical judgment when providing care.Additional guidelines  
                                                                                            
                    |
|  are also available online from the following facilities: [facility] Recent     
                                                                                            
                    |
| Emergency Department Visit SummaryDate Facility [facility] State Type Major Type Diagnoses or   
                                                                                            
                    |
| Chief Complaint 2019 [facility] [place]. [place] Emergency  Emergency   [place]    
                                                                                            
                    |
[facility] [place] [place] [facility]    Chest Pain    Nausea      
                                                                                            
                    |
| Shortness of Breath    Chest pain, unspecified    Elevated blood-pressure reading,        
                                                                                            
                    |
| without diagnosis of hypertension    Presence of aortocoronary bypass graft    Other      
                                                                                            
                    |
| specified postprocedural states  2019 [facility] [place]. [place] Emergency    
                                                                                            
                    |
| Emergency    CHEST PAIN    Abnormal levels of other serum enzymes    Personal history of  
                                                                                            
                    |
|  other diseases of the circulatory system    Chest pain, unspecified  2019 [facility]    
                                                                                            
                    |
| [facility] [place]. [place] Emergency  Emergency    FISTULA BLEEDING    Hemorrhage due to   
                                                                                            
                    |
| vascular prosthetic devices, implants and grafts, initial encounter  Dec 22, 2018 [facility]    
                                                                                            
                    |
| [facility] [place]. [place] Emergency  Emergency    CHEST PAIN    Type 2 diabetes mellitus  
                                                                                            
                    |
|  with hyperglycemia    Chest pain, unspecified  2018 [facility] [place].   
                                                                                            
                    |
| [place] Emergency  Emergency  Chief Complaint: SOB  2018 [facility].   
                                                                                            
                    |
| [place] Emergency  Emergency    FALL    Unspecified fall, initial encounter    Dependence on   
                                                                                            
                    |
| renal dialysis    End stage renal disease  2018 [facility]. [place]    
                                                                                            
                    |
| Emergency  Emergency    FALL    Essential (primary) hypertension    Type 2 diabetes       
                                                                                            
 
                    |
| mellitus with hyperglycemia    Type 2 diabetes mellitus with unspecified complications    
                                                                                            
                    |
|   Unspecified fall, initial encounter    Headache  2018 [facility] LUCY[place]       
                                                                                            
                    |
| [place]. [place] Emergency  Emergency    -1. Dorsalgia, unspecified    -1. Painful micturition,  
                                                                                            
                    |
|  unspecified    0. Frequency of micturition    1. Urinary tract infection, site not       
                                                                                            
                    |
| specified    6. Type 2 diabetes mellitus with hyperglycemia    7. Type 2 diabetes         
                                                                                            
                    |
| mellitus with diabetic chronic kidney disease    8. End stage renal disease    9.         
                                                                                            
                    |
| Dependence on renal dialysis    10. Shortness of breath    11. Long term (current) use    
                                                                                            
                    |
| of anticoagulants  E.D. Visit Count (12 mo.)Facility Visits [facility]      
                                                                                            
                    |
| Health 17 0 [facility] 2 0 [facility] 3 0 Total 22 0   
                                                                                            
                    |
| Note: Visits indicate total known visits. Medicaid Low Acuity Dx are the number of        
                                                                                            
                    |
| primary diagnoses on the Medicaid's Low Acuity dx list.  Recent Inpatient Visit           
                                                                                            
                    |
| SummaryDate Facility [facility] Type Major Type Diagnoses or Chief Complaint Dec 27,      
                                                                                            
                    |
|  [facility] [place]. [place] Recovery  Inpatient    Peripheral arterial disease,   
                                                                                            
                    |
| osteomyelitis left foot    Other acute osteomyelitis, left ankle and foot    Long term    
                                                                                            
                    |
| (current) use of antibiotics    End stage renal disease    Anemia in chronic kidney       
                                                                                            
                    |
| disease  Dec 5, 2018 [facility] [place]. [place] General Medicine  Inpatient           
                                                                                            
                    |
| Peripheral vascular disease, unspecified    End stage renal disease    Dependence on      
                                                                                            
                    |
| renal dialysis    Long term (current) use of insulin    Other chronic osteomyelitis,      
                                                                                            
                    |
| left ankle and foot    Type 2 diabetes mellitus with diabetic chronic kidney disease      
                                                                                            
                    |
| Essential (primary) hypertension  2018 [facility] [place]. [place] Inpatient   
                                                                                            
 
                    |
|  Inpatient    Upper GIB    Other chronic osteomyelitis, unspecified ankle and foot        
                                                                                            
                    |
| End stage renal disease    Other specified personal risk factors, not elsewhere           
                                                                                            
                    |
| classified    Chronic systolic (congestive) heart failure    Anemia in chronic kidney     
                                                                                            
                    |
| disease    Other disorders of plasma-protein metabolism, not elsewhere classified         
                                                                                            
                    |
| Moderate protein-calorie malnutrition    Other disorders of phosphorus metabolism  Nov    
                                                                                            
                    |
| 2018 [facility] [place]. [place] Medical Surgical  Inpatient    1. Type 2 diabetes  
                                                                                            
                    |
|  mellitus with other specified complication    2. Urinary tract infection, site not       
                                                                                            
                    |
| specified    3. Unspecified protein-calorie malnutrition    4. Other chronic              
                                                                                            
                    |
| osteomyelitis, unspecified site    5. Hypertensive heart and chronic kidney disease with  
                                                                                            
                    |
|  heart failure and with stage 5 chronic kidney disease, or end stage renal disease    6.  
                                                                                            
                    |
|  Chronic diastolic (congestive) heart failure    7. Other osteomyelitis, ankle and foot   
                                                                                            
                    |
|    8. Type 2 diabetes mellitus with foot ulcer    9. Atherosclerotic heart disease of     
                                                                                            
                    |
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 Chronic obstructive pulmonary       
                                                                                            
                    |
| disease, unspecified  2018 [facility] [place]. [place] Medical Surgical          
                                                                                            
                    |
| Inpatient    1. Benign paroxysmal vertigo, unspecified ear    2. End stage renal disease  
                                                                                            
                    |
|     3. Hypertensive chronic kidney disease with stage 5 chronic kidney disease or end     
                                                                                            
                    |
| stage renal disease    4. Urinary tract infection, site not specified    5. Hypertensive  
                                                                                            
                    |
|  heart and chronic kidney disease with heart failure and with stage 5 chronic kidney      
                                                                                            
                    |
| disease, or end stage renal disease    6. Kidney transplant status    7. Unspecified      
                                                                                            
                    |
| systolic (congestive) heart failure    8. Syncope and collapse    9. Type 2 diabetes      
                                                                                            
 
                    |
| mellitus with diabetic chronic kidney disease    10. Atherosclerotic heart disease of     
                                                                                            
                    |
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     
                                                                                            
                    |
| query found no report.[facility] Type Phone Fax Service Dates HEADINGS,   
                                                                                            
                    |
| ERNST HENDERSONP. Nurse Practitioner: Family   Current  [facility] /Care       
                                                                                            
                    |
| Coordinator [phone]  2019 - Current  [facility] ([phone])      
                                                                                            
                    |
| [phone]  2019 - Current  [facility]  ([phone])        
                                                                                            
                    |
| [phone] Current  [facility]   Current  [facility]O      
                                                                                            
                    |
| [facility] Primary Care   Current  [facility] Provider [phone]  
                                                                                            
                    |
|  [phone] Current  [facility] Case or Care Manager   Current  Willard Crook -             
                                                                                            
                    |
| MIRTA Ortiz Case or Care Manager [phone] Current  Brenda,        
                                                                                            
                    |
| Jairo HARRIS MD Other   Current  Known AliasesNo known aliases. Criteria Met  Care          
                                                                                            
                    |
| Guidelines  10 in 12  3 in 60The above information is provided for the sole purpose of    
                                                                                            
                    |
| patient treatment. Use of this information beyond the terms of Data Sharing Memorandum    
                                                                                            
                    |
| of Understanding and License Agreement is prohibited. In certain cases not all visits     
                                                                                            
                    |
| may be represented. Consult the aforementioned facilities for additional information.     
                                                                                            
                    |
|  [employer]. [place], [place] -                         
                                                                                            
                    |
| [email or web address])                                                            
                                                                                            
                    |
|  Peripheral arterial disease, osteomyelitis left foot                                     
                                                                                            
                    |
|   Other acute osteomyelitis, left ankle and foot                                          
                                                                                            
                    |
|   Long term (current) use of antibiotics                                                  
                                                                                            
 
                    |
|   End stage renal disease                                                                 
                                                                                            
                    |
|   Anemia in chronic kidney disease                                                        
                                                                                            
                    |
|                                                                                           
                                                                                            
                    |
|[facility] [place] [place] General Medicine  Inpatient                     
                                                                                            
                    |
|  Peripheral vascular disease, unspecified                                                 
                                                                                            
                    |
|   End stage renal disease                                                                 
                                                                                            
                    |
|   Dependence on renal dialysis                                                            
                                                                                            
                    |
|   Long term (current) use of insulin                                                      
                                                                                            
                    |
|   Other chronic osteomyelitis, left ankle and foot                                        
                                                                                            
                    |
|   Type 2 diabetes mellitus with diabetic chronic kidney disease                           
                                                                                            
                    |
|   Essential (primary) hypertension                                                        
                                                                                            
                    |
|                                                                                           
                                                                                            
                    |
|2018 [facility] [place]. [place] Inpatient  Inpatient                           
                                                                                            
                    |
|  Upper GIB                                                                                
                                                                                            
                    |
|   Other chronic osteomyelitis, unspecified ankle and foot                                 
                                                                                            
                    |
|   End stage renal disease                                                                 
                                                                                            
                    |
|   Other specified personal risk factors, not elsewhere classified                         
                                                                                            
                    |
|   Chronic systolic (congestive) heart failure                                             
                                                                                            
                    |
|   Anemia in chronic kidney disease                                                        
                                                                                            
                    |
|   Other disorders of plasma-protein metabolism, not elsewhere classified                  
                                                                                            
 
                    |
|   Moderate protein-calorie malnutrition                                                   
                                                                                            
                    |
|   Other disorders of phosphorus metabolism                                                
                                                                                            
                    |
|                                                                                           
                                                                                            
                    |
|2018 [place]. [place] Medical Surgical  Inpatient                     
                                                                                            
                    |
|  1. Type 2 diabetes mellitus with other specified complication                            
                                                                                            
                    |
|   2. Urinary tract infection, site not specified                                          
                                                                                            
                    |
|   3. Unspecified protein-calorie malnutrition                                             
                                                                                            
                    |
|   4. Other chronic osteomyelitis, unspecified site                                        
                                                                                            
                    |
|   5. 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
onic kidney disease, or end stage renal disease                                             
                    |
|   6. Chronic diastolic (congestive) heart failure                                         
                                                                                            
                    |
|   7. Other osteomyelitis, ankle and foot                                                  
                                                                                            
                    |
|   8. Type 2 diabetes mellitus with foot ulcer                                             
                                                                                            
                    |
|   9. Atherosclerotic heart disease of native coronary artery without angina pectoris      
                                                                                            
                    |
|   10. Chronic obstructive pulmonary disease, unspecified                                  
                                                                                            
                    |
|                                                                                           
                                                                                            
                    |
|2018 [facility]. [place] [facility]  Inpatient                      
                                                                                            
                    |
|  1. Benign paroxysmal vertigo, unspecified ear                                            
                                                                                            
                    |
|   2. End stage renal disease                                                              
                                                                                            
                    |
|   3. Hypertensive chronic kidney disease with stage 5 chronic kidney disease or end stage 
renal disease                                                                               
                    |
|   4. Urinary tract infection, site not specified                                          
                                                                                            
 
                    |
|   5. 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
onic kidney disease, or end stage renal disease                                             
                    |
|   6. Kidney transplant status                                                             
                                                                                            
                    |
|   7. Unspecified systolic (congestive) heart failure                                      
                                                                                            
                    |
|   8. Syncope and collapse                                                                 
                                                                                            
                    |
|   9. Type 2 diabetes mellitus with diabetic chronic kidney disease                        
                                                                                            
                    |
|   10. Atherosclerotic heart disease of native coronary artery without angina pectoris     
                                                                                            
                    |
|                                                                                           
                                                                                            
                    |
|                                                                                           
                                                                                            
                    |
|                                                                                           
                                                                                            
                    |
|Prescription Drug Report (12 Mo.)                                                          
                                                                                            
                    |
|PDMP query found no report.                                                                
                                                                                            
                    |
|                                                                                           
                                                                                            
                    |
|Care Providers                                                                             
                                                                                            
                    |
|Provider PRC Type Phone Fax Service Dates                                                  
                                                                                            
                    |
|HEADINGS, SANTIAGO, F.N.P. Nurse Practitioner: Family   Current                                
                                                                                            
                    |
|[facility] /Care Coordinator [phone]  2019 - Current         
                                                                                            
                    |
|[facility] [phone]  2019 - Current                          
                                                                                            
                    |
|[facility]  [phone] Current                         
                                                                                            
                    |
|[facility]   Current                                
                                                                                            
                    |
|[facility] Primary Care   Current                                                        
                                                                                            
 
                    |
|[facility] Mental Health Provider [phone] Current                 
                                                                                            
                    |
|[facility] Case or Care Manager   Current                                                   
                                                                                            
                    |
|MIRTA Goel Case or Care Manager [phone] Current
                                                                                            
                    |
|Jairo Gutierrez MD Other   Current                                                       
                                                                                            
                    |
|                                                                                           
                                                                                            
                    |
|Known Aliases                                                                              
                                                                                            
                    |
|No known aliases.                                                                          
                                                                                            
                    |
|Criteria Met                                                                               
                                                                                            
                    |
|                                                                                           
                                                                                            
                    |
|  Care Guidelines                                                                          
                                                                                            
                    |
|  10 in 12                                                                                 
                                                                                            
                    |
|  3 in 60                                                                                  
                                                                                            
                    |
|                                                                                           
                                                                                            
                    |
|The above information is provided for the sole purpose of patient treatment. Use of this in
formation beyond the terms of Data Sharing Memorandum of Understanding and License Agreement
 is prohibited. In  |
|certain cases not all visits may be represented. Consult the aforementioned facilities for 
additional information.                                                                     
                    |
|2019 [employer] - [place], [place] - [email or web address]                                                                                       
                    |
+-------------------------------------------------------------------------------------------
--------------------------------------------------------------------------------------------
--------------------+
 
 
 
+-------------------+---------+--------------------+--------------+
| Performing        | Address | City/State/Zipcode | Phone Number |
| Organization      |         |                    |              |
+-------------------+---------+--------------------+--------------+
|   ED INFORMATION  |         |                    |              |
 
| EXCHANGE          |         |                    |              |
+-------------------+---------+--------------------+--------------+
 in this encounter
 
 Visit Diagnoses
 
 
+-----------------------------------------------------------------------------------+
| Diagnosis                                                                         |
+-----------------------------------------------------------------------------------+
|   PVD (peripheral vascular disease) (HCC) - Primary                               |
+-----------------------------------------------------------------------------------+
|   Peripheral vascular disease, unspecified                                        |
+-----------------------------------------------------------------------------------+
|   ESRD (end stage renal disease) on dialysis (HCC)                                |
+-----------------------------------------------------------------------------------+
|   End stage renal disease                                                         |
+-----------------------------------------------------------------------------------+
|   Anemia in ESRD (end-stage renal disease) (HCC)                                  |
+-----------------------------------------------------------------------------------+
|   Anemia in chronic kidney disease                                                |
+-----------------------------------------------------------------------------------+
|   Hypoalbuminemia                                                                 |
+-----------------------------------------------------------------------------------+
|   Other disorders of plasma protein metabolism                                    |
+-----------------------------------------------------------------------------------+
|   Electrolyte imbalance risk                                                      |
+-----------------------------------------------------------------------------------+
|   Other specified conditions influencing health status                            |
+-----------------------------------------------------------------------------------+
|   ESRD (end stage renal disease) (HCC)                                            |
+-----------------------------------------------------------------------------------+
|   End stage renal disease                                                         |
+-----------------------------------------------------------------------------------+
|   CAD (coronary artery disease)                                                   |
+-----------------------------------------------------------------------------------+
|   Coronary atherosclerosis of unspecified type of vessel, native or graft         |
+-----------------------------------------------------------------------------------+
|   Paroxysmal atrial fibrillation (HCC)                                            |
+-----------------------------------------------------------------------------------+
|   Atrial fibrillation                                                             |
+-----------------------------------------------------------------------------------+
|   COPD (chronic obstructive pulmonary disease) (HCC)                              |
+-----------------------------------------------------------------------------------+
|   Chronic airway obstruction, not elsewhere classified                            |
+-----------------------------------------------------------------------------------+
|   Chronic systolic congestive heart failure (HCC)                                 |
+-----------------------------------------------------------------------------------+
|   Chronic systolic heart failure                                                  |
+-----------------------------------------------------------------------------------+
|   Hypertension, essential                                                         |
+-----------------------------------------------------------------------------------+
|   Unspecified essential hypertension                                              |
+-----------------------------------------------------------------------------------+
|   Type 2 diabetes mellitus with chronic kidney disease on chronic dialysis, with  |
| long-term current use of insulin (HCC)                                            |
+-----------------------------------------------------------------------------------+
 
 
 
 
 Admitting Diagnoses
 
 
+----------------------------------------------------+
| Diagnosis                                          |
+----------------------------------------------------+
|   PVD (peripheral vascular disease) (HCC)          |
+----------------------------------------------------+
|   Peripheral vascular disease, unspecified         |
+----------------------------------------------------+
|   ESRD (end stage renal disease) on dialysis (HCC) |
+----------------------------------------------------+
|   End stage renal disease                          |
+----------------------------------------------------+
 
 
 
 Administered Medications
 
 
+------------------+--------+---------+------+------+------+
| Medication Order | MAR    | Action  | Dose | Rate | Site |
|                  | Action | Date    |      |      |      |
+------------------+--------+---------+------+------+------+
 
 
 
+-----------------------------------+---+
|   acetaminophen (TYLENOL)         |   |
| suppository 650 mg  650 mg,       |   |
| Rectal, Every 6 Hours PRN, Mild   |   |
| Pain (1-3), Fever, Starting Wed   |   |
| 19 at 1910                   |   |
+-----------------------------------+---+
|                                   |   |
+-----------------------------------+---+
|   acetaminophen (TYLENOL) tablet  |   |
| 650 mg  650 mg, Oral, Every 6     |   |
| Hours PRN, Mild Pain (1-3),       |   |
| Fever, Starting Wed 19 at    |   |
| 1910                              |   |
+-----------------------------------+---+
|                                   |   |
+-----------------------------------+---+
 
 
 
+-----------------------------------+-------+----------+--------+---+---+
|   apixaban (ELIQUIS) tablet 2.5   | Given |  | 2.5 mg |   |   |
| mg  2.5 mg, Oral, 2 Times Daily,  |       | 9 14:01  |        |   |   |
| First dose on Thu 19 at 1200 |       | PST      |        |   |   |
+-----------------------------------+-------+----------+--------+---+---+
 
 
 
+-------+----------+--------+---+---+
| Given |  | 2.5 mg |   |   |
|       | 9 20:51  |        |   |   |
|       | PST      |        |   |   |
+-------+----------+--------+---+---+
 
| Given | 1/25/201 | 2.5 mg |   |   |
|       | 9 08:18  |        |   |   |
|       | PST      |        |   |   |
+-------+----------+--------+---+---+
 
 
 
+---+---+
|   |   |
+---+---+
 
 
 
+-----------------------------------+-------+----------+-------+---+---+
|   aspirin chewable tablet 81 mg   | Given |  | 81 mg |   |   |
| 81 mg, Oral, Daily With           |       | 9 10:01  |       |   |   |
| Breakfast, First dose on Fri      |       | PST      |       |   |   |
| 19 at 0930                   |       |          |       |   |   |
+-----------------------------------+-------+----------+-------+---+---+
 
 
 
+---+---+
|   |   |
+---+---+
 
 
 
+-----------------------------------+-------+----------+-------+---+---+
|   aspirin EC tablet 81 mg  81 mg, | Given |  | 81 mg |   |   |
|  Oral, Daily With Breakfast,      |       | 9 14:00  |       |   |   |
| First dose on u 19 at 0800 |       | PST      |       |   |   |
+-----------------------------------+-------+----------+-------+---+---+
 
 
 
+-------+----------+-------+---+---+
| Given |  | 81 mg |   |   |
|       | 9 08:18  |       |   |   |
|       | PST      |       |   |   |
+-------+----------+-------+---+---+
 
 
 
+---+---+
|   |   |
+---+---+
 
 
 
+-----------------------------------+-------+----------+-------+---+---+
|   atorvastatin (LIPITOR) tablet   | Given |  | 40 mg |   |   |
| 40 mg  40 mg, Oral, Nightly,      |       | 9 21:59  |       |   |   |
| First dose on Wed 19 at 2200 |       | PST      |       |   |   |
+-----------------------------------+-------+----------+-------+---+---+
 
 
 
+-------+----------+-------+---+---+
| Given |  | 40 mg |   |   |
 
|       | 9 20:51  |       |   |   |
|       | PST      |       |   |   |
+-------+----------+-------+---+---+
 
 
 
+---+---+
|   |   |
+---+---+
 
 
 
+----------------------------------+-------+----------+--------+---+---+
|   calcium acetate (PHOSLO)       | Given |  | 667 mg |   |   |
| capsule 667 mg  667 mg, Oral, 3  |       | 9 17:07  |        |   |   |
| Times Daily With Meals, First    |       | PST      |        |   |   |
| dose on 19 at 1930      |       |          |        |   |   |
+----------------------------------+-------+----------+--------+---+---+
 
 
 
+-------+----------+--------+---+---+
| Given |  | 667 mg |   |   |
|       | 9 08:17  |        |   |   |
|       | PST      |        |   |   |
+-------+----------+--------+---+---+
| Given |  | 667 mg |   |   |
|       | 9 12:33  |        |   |   |
|       | PST      |        |   |   |
+-------+----------+--------+---+---+
 
 
 
+---+---+
|   |   |
+---+---+
 
 
 
+-----------------------------------+-------+----------+---------+---+---+
|   carvedilol (COREG) tablet 12.5  | Given |  | 12.5 mg |   |   |
| mg  12.5 mg, Oral, 2 Times Daily  |       | 9 21:59  |         |   |   |
| With Meals, First dose on Wed     |       | PST      |         |   |   |
| 19 at 1930                   |       |          |         |   |   |
+-----------------------------------+-------+----------+---------+---+---+
 
 
 
+-------+----------+---------+---+---+
| Given |  | 12.5 mg |   |   |
|       | 9 17:07  |         |   |   |
|       | PST      |         |   |   |
+-------+----------+---------+---+---+
| Given |  | 12.5 mg |   |   |
|       | 9 08:17  |         |   |   |
|       | PST      |         |   |   |
+-------+----------+---------+---+---+
 
 
 
 
+-----------------------------------+---+
|                                   |   |
+-----------------------------------+---+
|   cefTAZidime (FORTAZ) 2 g in     |   |
| sodium chloride (IV) 0.9 % 50 mL  |   |
| IVPB  2 g, Intravenous,           |   |
| Administer over 30 Minutes, After |   |
|  Hemodialysis (see admin          |   |
| instructions), Starting Wed       |   |
| 19 at 1327                   |   |
+-----------------------------------+---+
|                                   |   |
+-----------------------------------+---+
 
 
 
+-----------------------------------+-------+----------+-----+-------+---+
|   cefTAZidime (FORTAZ) 2 g in     | Given |  | 2 g | 100   |   |
| sodium chloride (IV) 0.9 % 50 mL  |       | 9 20:55  |     | mL/hr |   |
| IVPB  2 g, Intravenous,           |       | PST      |     |       |   |
| Administer over 30 Minutes, Once, |       |          |     |       |   |
|  Wed 19 at 1930, For 1 dose  |       |          |     |       |   |
+-----------------------------------+-------+----------+-----+-------+---+
 
 
 
+---+---+
|   |   |
+---+---+
 
 
 
+-----------------------------------+-------+----------+--------+---+---+
|   cholecalciferol (VITAMIN D-3)   | Given |  | 5,000  |   |   |
| tablet 5,000 Units  5,000 Units,  |       | 9 21:58  | Units  |   |   |
| Oral, Daily, First dose on Wed    |       | PST      |        |   |   |
| 19 at 1930                   |       |          |        |   |   |
+-----------------------------------+-------+----------+--------+---+---+
 
 
 
+-------+----------+--------+---+---+
| Given |  | 5,000  |   |   |
|       | 9 14:00  | Units  |   |   |
|       | PST      |        |   |   |
+-------+----------+--------+---+---+
| Given |  | 5,000  |   |   |
|       | 9 08:18  | Units  |   |   |
|       | PST      |        |   |   |
+-------+----------+--------+---+---+
 
 
 
+---+---+
|   |   |
+---+---+
 
 
 
+-----------------------------------+-------+----------+-------+---+---+
 
|   clopidogrel (PLAVIX) tablet 75  | Given |  | 75 mg |   |   |
| mg  75 mg, Oral, Daily, First     |       | 9 14:01  |       |   |   |
| dose on Thu 19 at 0900       |       | PST      |       |   |   |
+-----------------------------------+-------+----------+-------+---+---+
 
 
 
+-------+----------+-------+---+---+
| Given |  | 75 mg |   |   |
|       | 9 08:17  |       |   |   |
|       | PST      |       |   |   |
+-------+----------+-------+---+---+
 
 
 
+---+---+
|   |   |
+---+---+
 
 
 
+----------------------------------+-------+----------+---------+---+---+
|   diphenhydrAMINE (BENADRYL)     | Given |  | 12.5 mg |   |   |
| injection 12.5 mg  12.5 mg,      |       | 9 09:56  |         |   |   |
| Intravenous, Every 6 Hours PRN,  |       | PST      |         |   |   |
| Allergic Reaction, Itching,      |       |          |         |   |   |
| Starting Fri 19 at 0842     |       |          |         |   |   |
+----------------------------------+-------+----------+---------+---+---+
 
 
 
+---+---+
|   |   |
+---+---+
 
 
 
+---------------------------------+-------+----------+---------+---+---+
|   epoetin byron (PROCRIT)        | Given |  | 10,000  |   |   |
| injection 10,000 Units  10,000  |       | 9 10:07  | Units   |   |   |
| Units, Intravenous, Once In     |       | PST      |         |   |   |
| Dialysis, Thu 19 at 0900,  |       |          |         |   |   |
| For 1 dose, DIALYSIS            |       |          |         |   |   |
+---------------------------------+-------+----------+---------+---+---+
 
 
 
+---+---+
|   |   |
+---+---+
 
 
 
+----------------------------------+-------+----------+-------+---+---+
|   famotidine (PEPCID) tablet 10  | Given |  | 10 mg |   |   |
| mg  10 mg, Oral, Daily, First    |       | 9 22:00  |       |   |   |
| dose on Wed 19 at 1930      |       | PST      |       |   |   |
+----------------------------------+-------+----------+-------+---+---+
 
 
 
 
+-------+----------+-------+---+---+
| Given |  | 10 mg |   |   |
|       | 9 14:01  |       |   |   |
|       | PST      |       |   |   |
+-------+----------+-------+---+---+
| Given |  | 10 mg |   |   |
|       | 9 08:17  |       |   |   |
|       | PST      |       |   |   |
+-------+----------+-------+---+---+
 
 
 
+-----------------------------------+---+
|                                   |   |
+-----------------------------------+---+
|   fentaNYL (SUBLIMAZE) injection  |   |
| 25 mcg  25 mcg, Intravenous,      |   |
| Every 1 Hour PRN, Moderate Pain   |   |
| (4-6), Starting 19 at    |   |
| 1910                              |   |
+-----------------------------------+---+
|                                   |   |
+-----------------------------------+---+
|   fentaNYL (SUBLIMAZE) injection  |   |
| 50 mcg  50 mcg, Intravenous,      |   |
| Every 1 Hour PRN, Severe Pain     |   |
| (7-10), Starting 19 at   |   |
| 1910                              |   |
+-----------------------------------+---+
|                                   |   |
+-----------------------------------+---+
 
 
 
+-----------------------------------+-------+----------+--------+---+---+
|   fentaNYL (SUBLIMAZE) injection  | Given |  | 50 mcg |   |   |
|  Once PRN, Starting 19   |       | 9 18:00  |        |   |   |
| at 1800, Intra-procedure          |       | PST      |        |   |   |
| (CATH/IR)                         |       |          |        |   |   |
+-----------------------------------+-------+----------+--------+---+---+
 
 
 
+---+---+
|   |   |
+---+---+
 
 
 
+-----------------------------------+-------+----------+--------+---+---+
|   heparin (porcine) 1000 UNIT/ML  | Given |  | 5,000  |   |   |
| injection  Once PRN, Starting Wed |       | 9 18:23  | Units  |   |   |
|  19 at 1823, Intra-procedure |       | PST      |        |   |   |
|  (CATH/IR)                        |       |          |        |   |   |
+-----------------------------------+-------+----------+--------+---+---+
 
 
 
+---+---+
 
|   |   |
+---+---+
 
 
 
+----------------------------------+-------+----------+----------+---+---+
|   HYDROcodone-acetaminophen      | Given |  | 1 tablet |   |   |
| (NORCO)  MG per tablet 1   |       | 9 11:09  |          |   |   |
| tablet  1 tablet, Oral, Every 4  |       | PST      |          |   |   |
| Hours PRN, Severe Pain (7-10),   |       |          |          |   |   |
| Starting Wed 19 at 1910     |       |          |          |   |   |
+----------------------------------+-------+----------+----------+---+---+
 
 
 
+-------+----------+----------+---+---+
| Given |  | 1 tablet |   |   |
|       | 9 20:51  |          |   |   |
|       | PST      |          |   |   |
+-------+----------+----------+---+---+
 
 
 
+---+---+
|   |   |
+---+---+
 
 
 
+----------------------------------+-------+----------+----------+---+---+
|   HYDROcodone-acetaminophen      | Given |  | 1 tablet |   |   |
| (NORCO) 5-325 MG per tablet 1    |       | 9 06:26  |          |   |   |
| tablet  1 tablet, Oral, Every 4  |       | PST      |          |   |   |
| Hours PRN, Moderate Pain (4-6),  |       |          |          |   |   |
| Starting Wed 19 at 1910     |       |          |          |   |   |
+----------------------------------+-------+----------+----------+---+---+
 
 
 
+-------+----------+----------+---+---+
| Given |  | 1 tablet |   |   |
|       | 9 09:56  |          |   |   |
|       | PST      |          |   |   |
+-------+----------+----------+---+---+
 
 
 
+---+---+
|   |   |
+---+---+
 
 
 
+-----------------------------------+-------+----------+--------+---+----------+
|   HYDROmorphone (DILAUDID)        | Given |  | 0.5 mg |   | Left Arm |
| injection 0.5 mg  0.5 mg,         |       | 9 15:55  |        |   |          |
| Intravenous, Once, Wed 19 at |       | PST      |        |   |          |
|  1508, For 1 dose                 |       |          |        |   |          |
+-----------------------------------+-------+----------+--------+---+----------+
 
 
 
 
+-----------------------------------+---+
|                                   |   |
+-----------------------------------+---+
|   HYDROmorphone (DILAUDID)        |   |
| injection 0.5 mg  0.5 mg,         |   |
| Intravenous, Every 3 Hours PRN,   |   |
| Moderate Pain (4-6), Starting Wed |   |
|  19 at 1910                  |   |
+-----------------------------------+---+
|                                   |   |
+-----------------------------------+---+
|   HYDROmorphone (DILAUDID)        |   |
| injection 1 mg  1 mg,             |   |
| Intravenous, Every 3 Hours PRN,   |   |
| Severe Pain (7-10), Starting Wed  |   |
| 19 at 1910                   |   |
+-----------------------------------+---+
|                                   |   |
+-----------------------------------+---+
 
 
 
+-----------------------------------+-------+----------+----------+---+---+
|   insulin glargine (LANTUS)       | Given |  | 15 Units |   |   |
| injection 15 Units  15 Units,     |       | 9 22:07  |          |   |   |
| Subcutaneous, Nightly, First dose |       | PST      |          |   |   |
|  on Wed 19 at 2200           |       |          |          |   |   |
+-----------------------------------+-------+----------+----------+---+---+
 
 
 
+-------+----------+----------+---+---+
| Given |  | 15 Units |   |   |
|       | 9 22:39  |          |   |   |
|       | PST      |          |   |   |
+-------+----------+----------+---+---+
 
 
 
+---+---+
|   |   |
+---+---+
 
 
 
+-----------------------------------+-------+----------+---------+---+---+
|   insulin lispro (human)          | Given |  | 1 Units |   |   |
| (HUMALOG) injection 0-3 Units     |       | 9 22:39  |         |   |   |
| 0-3 Units, Subcutaneous, Nightly, |       | PST      |         |   |   |
|  First dose on 19 at     |       |          |         |   |   |
| 2200                              |       |          |         |   |   |
+-----------------------------------+-------+----------+---------+---+---+
 
 
 
+---+---+
|   |   |
+---+---+
 
 
 
 
+-----------------------------------+-------+----------+---------+---+---+
|   insulin lispro (human)          | Given |  | 1 Units |   |   |
| (HUMALOG) injection 0-6 Units     |       | 9 17:12  |         |   |   |
| 0-6 Units, Subcutaneous, 3 Times  |       | PST      |         |   |   |
| Daily Before Meals, First dose on |       |          |         |   |   |
|  19 at 1930              |       |          |         |   |   |
+-----------------------------------+-------+----------+---------+---+---+
 
 
 
+-------+----------+---------+---+---+
| Given |  | 2 Units |   |   |
|       | 9 12:34  |         |   |   |
|       | PST      |         |   |   |
+-------+----------+---------+---+---+
 
 
 
+---+---+
|   |   |
+---+---+
 
 
 
+---------------------------------+-------+----------+--------+---+---+
|   iohexol (OMNIPAQUE 240) 240   | Given |  | 53 mLs |   |   |
| mg/mL injection 53 mL  53 mL,   |       | 9 18:34  |        |   |   |
| Intra-arterial, Img Once PRN,   |       | PST      |        |   |   |
| Other, Starting Wed 19 at  |       |          |        |   |   |
| 1853, For 1 dose                |       |          |        |   |   |
+---------------------------------+-------+----------+--------+---+---+
 
 
 
+---+---+
|   |   |
+---+---+
 
 
 
+-----------------------------------+-------+----------+-------+---+---+
|   isosorbide mononitrate (IMDUR)  | Given |  | 60 mg |   |   |
| 24 hr tablet 60 mg  60 mg, Oral,  |       | 9 14:02  |       |   |   |
| Daily, First dose on Thu 19  |       | PST      |       |   |   |
| at 0900                           |       |          |       |   |   |
+-----------------------------------+-------+----------+-------+---+---+
 
 
 
+-------+----------+-------+---+---+
| Given |  | 60 mg |   |   |
|       | 9 08:18  |       |   |   |
|       | PST      |       |   |   |
+-------+----------+-------+---+---+
 
 
 
 
+---+---+
|   |   |
+---+---+
 
 
 
+----------------------------------+-------+----------+--------+---+---+
|   lidocaine 1 % injection  Once  | Given |  | 10 mLs |   |   |
| PRN, During PCI per physician,   |       | 9 18:01  |        |   |   |
| Starting Wed 19 at 1801,    |       | PST      |        |   |   |
| Intra-procedure (CATH/IR)        |       |          |        |   |   |
+----------------------------------+-------+----------+--------+---+---+
 
 
 
+---+---+
|   |   |
+---+---+
 
 
 
+-----------------------------------+-------+----------+------+---+---+
|   LORazepam (ATIVAN) tablet 1 mg  | Given |  | 1 mg |   |   |
|  1 mg, Oral, Every 8 Hours PRN,   |       | 9 23:26  |      |   |   |
| Anxiety, Starting 19 at  |       | PST      |      |   |   |
| 1910                              |       |          |      |   |   |
+-----------------------------------+-------+----------+------+---+---+
 
 
 
+-------+----------+------+---+---+
| Given |  | 1 mg |   |   |
|       | 9 08:39  |      |   |   |
|       | PST      |      |   |   |
+-------+----------+------+---+---+
 
 
 
+---+---+
|   |   |
+---+---+
 
 
 
+----------------------------------+-------+----------+--------+---+---+
|   metroNIDAZOLE (FLAGYL) tablet  | Given |  | 500 mg |   |   |
| 500 mg  500 mg, Oral, Every 8    |       | 9 22:39  |        |   |   |
| Hours Scheduled (3 times per     |       | PST      |        |   |   |
| day), First dose on 19  |       |          |        |   |   |
| at 1400, Indications: Purulent   |       |          |        |   |   |
| Skin and Soft Tissue Infection   |       |          |        |   |   |
+----------------------------------+-------+----------+--------+---+---+
 
 
 
+-------+----------+--------+---+---+
| Given |  | 500 mg |   |   |
|       | 9 05:57  |        |   |   |
|       | PST      |        |   |   |
+-------+----------+--------+---+---+
 
| Given |  | 500 mg |   |   |
|       | 9 14:01  |        |   |   |
|       | PST      |        |   |   |
+-------+----------+--------+---+---+
 
 
 
+---+---+
|   |   |
+---+---+
 
 
 
+----------------------------------+-------+----------+------+---+---+
|   midazolam (VERSED) injection   | Given |  | 1 mg |   |   |
| Intravenous, Once PRN, Starting  |       | 9 18:00  |      |   |   |
| Wed 19 at 1800,             |       | PST      |      |   |   |
| Intra-procedure (CATH/IR)        |       |          |      |   |   |
+----------------------------------+-------+----------+------+---+---+
 
 
 
+-------+----------+------+---+---+
| Given | 1/23/201 | 1 mg |   |   |
|       | 9 18:23  |      |   |   |
|       | PST      |      |   |   |
+-------+----------+------+---+---+
 
 
 
+---+---+
|   |   |
+---+---+
 
 
 
+-----------------------------------+-------+----------+-------+---+---+
|   nortriptyline (PAMELOR) capsule | Given |  | 25 mg |   |   |
|  25 mg  25 mg, Oral, Every        |       | 9 08:18  |       |   |   |
| Morning, First dose on u        |       | PST      |       |   |   |
| 19 at 0900                   |       |          |       |   |   |
+-----------------------------------+-------+----------+-------+---+---+
 
 
 
+---+---+
|   |   |
+---+---+
 
 
 
+-----------------------------------+-------+----------+-------+---+---+
|   nortriptyline (PAMELOR) capsule | Given |  | 75 mg |   |   |
|  75 mg  75 mg, Oral, Nightly,     |       | 9 22:07  |       |   |   |
| First dose on 19 at 2200 |       | PST      |       |   |   |
+-----------------------------------+-------+----------+-------+---+---+
 
 
 
+-------+----------+-------+---+---+
 
| Given |  | 75 mg |   |   |
|       | 9 20:52  |       |   |   |
|       | PST      |       |   |   |
+-------+----------+-------+---+---+
 
 
 
+---+---+
|   |   |
+---+---+
 
 
 
+-----------------------------------+-------+----------+------+---+---+
|   ondansetron (ZOFRAN) injection  | Given |  | 4 mg |   |   |
| 4 mg  4 mg, Intravenous, Every 6  |       | 9 12:38  |      |   |   |
| Hours PRN, Nausea, Vomiting,      |       | PST      |      |   |   |
| Starting Wed 19 at 1910      |       |          |      |   |   |
+-----------------------------------+-------+----------+------+---+---+
 
 
 
+-------+----------+------+---+---+
| Given |  | 4 mg |   |   |
|       | 9 22:39  |      |   |   |
|       | PST      |      |   |   |
+-------+----------+------+---+---+
 
 
 
+-----------------------------------+---+
|                                   |   |
+-----------------------------------+---+
|   ondansetron (ZOFRAN-ODT)        |   |
| disintegrating tablet 4 mg  4 mg, |   |
|  Oral, Every 6 Hours PRN, Nausea, |   |
|  Vomiting, Starting 19   |   |
| at 1910                           |   |
+-----------------------------------+---+
|                                   |   |
+-----------------------------------+---+
 
 
 
+-----------------------------------+-------+----------+--------+---+---+
|   oxybutynin (DITROPAN) tablet    | Given |  | 2.5 mg |   |   |
| 2.5 mg  2.5 mg, Oral, 2 Times     |       | 9 21:59  |        |   |   |
| Daily, First dose on 19  |       | PST      |        |   |   |
| at 2100                           |       |          |        |   |   |
+-----------------------------------+-------+----------+--------+---+---+
 
 
 
+-------+----------+--------+---+---+
| Given |  | 2.5 mg |   |   |
|       | 9 20:51  |        |   |   |
|       | PST      |        |   |   |
+-------+----------+--------+---+---+
| Given |  | 2.5 mg |   |   |
|       | 9 08:18  |        |   |   |
 
|       | PST      |        |   |   |
+-------+----------+--------+---+---+
 
 
 
+---+---+
|   |   |
+---+---+
 
 
 
+-----------------------------------+-------+----------+-------+---+---+
|   pantoprazole (PROTONIX) EC      | Given |  | 40 mg |   |   |
| tablet 40 mg  40 mg, Oral, Every  |       | 9 06:26  |       |   |   |
| Morning Before Breakfast, First   |       | PST      |       |   |   |
| dose on Thu 19 at 0630       |       |          |       |   |   |
+-----------------------------------+-------+----------+-------+---+---+
 
 
 
+-------+----------+-------+---+---+
| Given |  | 40 mg |   |   |
|       | 9 05:57  |       |   |   |
|       | PST      |       |   |   |
+-------+----------+-------+---+---+
 
 
 
+---+---+
|   |   |
+---+---+
 
 
 
+-----------------------------------+-------+----------+---------+---+---+
|   rOPINIRole (REQUIP) tablet 0.75 | Given |  | 0.75 mg |   |   |
|  mg  0.75 mg, Oral, Nightly,      |       | 9 21:58  |         |   |   |
| First dose on 19 at 2200 |       | PST      |         |   |   |
+-----------------------------------+-------+----------+---------+---+---+
 
 
 
+-------+----------+---------+---+---+
| Given |  | 0.75 mg |   |   |
|       | 9 20:51  |         |   |   |
|       | PST      |         |   |   |
+-------+----------+---------+---+---+
 
 
 
+---+---+
|   |   |
+---+---+
 
 
 
+-----------------------------------+-------+----------+---+---+---+
|   silver sulfADIAZINE (SILVADENE) | Given |  |   |   |   |
|  1 % cream  Topical, 2 Times      |       | 9 13:00  |   |   |   |
| Daily, First dose on 19  |       | PST      |   |   |   |
 
| at 0930, On the right foot and    |       |          |   |   |   |
| cover with  telfa roll gauze and  |       |          |   |   |   |
| secure with  Tape.                |       |          |   |   |   |
+-----------------------------------+-------+----------+---+---+---+
 
 
 
+---+---+
|   |   |
+---+---+
 
 
 
+-----------------------------------+-------+----------+--------+---+---+
|   sodium chloride (PF) 0.9 %      | Given |  | 10 mLs |   |   |
| flush 10 mL  10 mL, Intravenous,  |       | 9 11:30  |        |   |   |
| Every 8 Hours, First dose on Wed  |       | PST      |        |   |   |
| 19 at 1930                   |       |          |        |   |   |
+-----------------------------------+-------+----------+--------+---+---+
 
 
 
+---+---+
|   |   |
+---+---+
 
 
 
+----------------------------------+---------+----------+-------+---+---+
|   vancomycin (VANCOCIN) 1500     | New Bag |  | 1.5 g |   |   |
| mg/250 mL IVPB  1.5 g,           |         | 9 18:12  |       |   |   |
| Intravenous, Administer over 90  |         | PST      |       |   |   |
| Minutes, Once, Wed 19 at    |         |          |       |   |   |
| 1432, For 1 dose                 |         |          |       |   |   |
+----------------------------------+---------+----------+-------+---+---+
 
 
 
+-----------------------------------+---+
|                                   |   |
+-----------------------------------+---+
|   vancomycin (VANCOCIN) 500 mg in |   |
|  sodium chloride (IV) 0.9 % 100   |   |
| mL IVPB  500 mg, Intravenous,     |   |
| Administer over 60 Minutes, See   |   |
| Admin Instructions, Starting Wed  |   |
| 19 at 1910, Administer dose  |   |
| during last hour or immediately   |   |
| following each Hemodialysis       |   |
| session. Use Rx button to message |   |
|  pharmacy for dose.               |   |
+-----------------------------------+---+
|                                   |   |
+-----------------------------------+---+
 
 
 
+-----------------------------------+-------+----------+--------+-------+---+
|   vancomycin (VANCOCIN) 500 mg in | Given |  | 500 mg | 100   |   |
|  sodium chloride (IV) 0.9 % 100   |       | 9 12:34  |        | mL/hr |   |
 
| mL IVPB  500 mg, Intravenous,     |       | PST      |        |       |   |
| Administer over 60 Minutes, Once, |       |          |        |       |   |
|  Fri 19 at 1230, For 1 dose  |       |          |        |       |   |
+-----------------------------------+-------+----------+--------+-------+---+
 
 
 
+---+---+
|   |   |
+---+---+
 in this encounter

## 2019-04-03 NOTE — XMS
Encounter Summary
  Created on: 2019
 
 Cherie Villalobos
 External Reference #: [id number]
 : 48
 Sex: Female
 
 Demographics
 
 
+-----------------------+--------------------------+
| Address               | [address]               |
|                       | [place]  [address] |
+-----------------------+--------------------------+
| Home Phone            | [phone]          |
+-----------------------+--------------------------+
| Preferred Language    | Unknown                  |
+-----------------------+--------------------------+
| Marital Status        |                   |
+-----------------------+--------------------------+
| Sabianist Affiliation | [id number]                     |
+-----------------------+--------------------------+
| Race                  | Unknown                  |
+-----------------------+--------------------------+
| Ethnic Group          | Unknown                  |
+-----------------------+--------------------------+
 
 
 Author
 
 
+--------------+--------------------------------------------+
| Author       | [facility]  |
|              | [facility] [place]                                |
+--------------+--------------------------------------------+
| Organization | [facility]  |
|              | [facility] [place]                                |
+--------------+--------------------------------------------+
| Address      | Unknown                                    |
+--------------+--------------------------------------------+
| Phone        | Unavailable                                |
+--------------+--------------------------------------------+
 
 
 
 Support
 
 
+---------------+--------------+---------------------+-----------------+
| Name          | Relationship | Address             | Phone           |
+---------------+--------------+---------------------+-----------------+
| Anoop Villalobos | [facility]         | [address], | [phone] |
|               |              |  [place]  [address]     |                 |
+---------------+--------------+---------------------+-----------------+
| Jennifer Dickson | [facility]         | [place], [place]       | [phone] |
|               |              | [id number]               |                 |
+---------------+--------------+---------------------+-----------------+
 
 
 
 
 Care Team Providers
 
 
+--------------------------+------+-----------------+
| Care Team Member Name    | Role | Phone           |
+--------------------------+------+-----------------+
| Araseli Montelongo Activity  | PCP  | [phone] |
| Professional             |      |                 |
+--------------------------+------+-----------------+
 
 
 
 Reason for Visit
 
 
+--------+----------+
| Reason | Comments |
+--------+----------+
| Other  | angina   |
+--------+----------+
 
 
 
 Encounter Details
 
 
+--------+-----------+----------------------+----------------------+----------------+
| Date   | Type      | Department           | Care Team            | Description    |
+--------+-----------+----------------------+----------------------+----------------+
| / | Telephone |   [facility]          |   Johnie John, | Other (angina) |
| 2019   |           | CARDIOLOGY  [address]    |  MD  [address] |                |
|        |           | [address]  [place], |  [place],   |                |
|        |           |  [place] [address]       | [place] [address]             |                |
|        |           | [phone]         | [phone]         |                |
|        |           |                      | [phone] (Fax)   |                |
+--------+-----------+----------------------+----------------------+----------------+
 
 
 
 Social History
 
 
+---------------+------------+-----------+--------+------------------+
| Tobacco Use   | Types      | Packs/Day | Years  | Date             |
|               |            |           | Used   |                  |
+---------------+------------+-----------+--------+------------------+
| Former Smoker | Cigarettes | 1         | 30     | Quit: 2004 |
+---------------+------------+-----------+--------+------------------+
 
 
 
+---------------------+---+---+---+
| Smokeless Tobacco:  |   |   |   |
| Never Used          |   |   |   |
+---------------------+---+---+---+
 
 
 
 
+-------------+-----------+---------+----------+
| Alcohol Use | Drinks/We | oz/Week | Comments |
|             | ek        |         |          |
+-------------+-----------+---------+----------+
| No          |           |         |          |
+-------------+-----------+---------+----------+
 
 
 
+------------------+---------------+
| Sex Assigned at  | Date Recorded |
| Birth            |               |
+------------------+---------------+
| Not on file      |               |
+------------------+---------------+
 as of this encounter
 
 Functional Status
 
 
+---------------------------------------------+----------+--------------------+
| Functional Status                           | Response | Date of Assessment |
+---------------------------------------------+----------+--------------------+
| Are you deaf or do you have serious         | No       | 2018         |
| difficulty hearing?                         |          |                    |
+---------------------------------------------+----------+--------------------+
| Are you blind or do you have serious        | No       | 2018         |
| difficulty seeing, even when wearing        |          |                    |
| glasses?                                    |          |                    |
+---------------------------------------------+----------+--------------------+
| Do you have serious difficulty walking or   | No       | 2018         |
| climbing stairs? (5 years old or older)     |          |                    |
+---------------------------------------------+----------+--------------------+
| Do you have difficulty dressing or bathing? | No       | 2018         |
|  (5 years old or older)                     |          |                    |
+---------------------------------------------+----------+--------------------+
| Because of a physical, mental, or emotional | No       | 2018         |
|  condition, do you have difficulty doing    |          |                    |
| errands alone such as visiting a doctor's   |          |                    |
| office or shopping? [15 years old or        |          |                    |
| older)]                                     |          |                    |
+---------------------------------------------+----------+--------------------+
 
 
 
+---------------------------------------------+----------+--------------------+
| Cognitive Status                            | Response | Date of Assessment |
+---------------------------------------------+----------+--------------------+
| Because of a physical, mental, or emotional | No       | 2018         |
|  condition, do you have serious difficulty  |          |                    |
| concentrating, remembering, or making       |          |                    |
| decisions? (5 years old or older)           |          |                    |
+---------------------------------------------+----------+--------------------+
 as of this encounter
 
 Plan of Treatment
 
 
+--------+---------+------------+----------------------+-------------+
 
| Date   | Type    | Specialty  | Care Team            | Description |
+--------+---------+------------+----------------------+-------------+
| / | Office  | Cardiology |   Johnie John, |             |
|    | Visit   |            |  MD  [address] |             |
|        |         |            |  [place],   |             |
|        |         |            | [place] [address]             |             |
|        |         |            | [phone]         |             |
|        |         |            | [phone] (Fax)   |             |
+--------+---------+------------+----------------------+-------------+
 as of this encounter
 
 Visit Diagnoses
 Not on filein this encounter

## 2019-04-03 NOTE — XMS
Clinical Summary
  Created on: 2019
 
 Cherie Jo
 External Reference #: [id number]
 : 48
 Sex: Female
 
 Demographics
 
 
+-----------------------+--------------------------+
| Address               | [address]               |
|                       | [place]  [address] |
+-----------------------+--------------------------+
| Home Phone            | [phone]          |
+-----------------------+--------------------------+
| Preferred Language    | Unknown                  |
+-----------------------+--------------------------+
| Marital Status        |                   |
+-----------------------+--------------------------+
| Faith Affiliation | [id number]                     |
+-----------------------+--------------------------+
| Race                  | Unknown                  |
+-----------------------+--------------------------+
| Ethnic Group          | Unknown                  |
+-----------------------+--------------------------+
 
 
 Author
 
 
+--------------+--------------------------------------------+
| Author       | [facility]  |
|              | [facility] [place]                                |
+--------------+--------------------------------------------+
| Organization | [facility]  |
|              | [facility] [place]                                |
+--------------+--------------------------------------------+
| Address      | Unknown                                    |
+--------------+--------------------------------------------+
| Phone        | Unavailable                                |
+--------------+--------------------------------------------+
 
 
 
 Support
 
 
+---------------+--------------+---------------------+-----------------+
| Name          | Relationship | Address             | Phone           |
+---------------+--------------+---------------------+-----------------+
| Anoop Jo | ECON         | [address] [facility] [place], | [phone] |
|               |              |  [place]  [address]     |                 |
+---------------+--------------+---------------------+-----------------+
| Jennifer Dickson | ECON         | [place], [place]       | [phone] |
|               |              | [phone]               |                 |
+---------------+--------------+---------------------+-----------------+
 
 
 
 
 Care Team Providers
 
 
+--------------------------+------+-----------------+
| Care Team Member Name    | Role | Phone           |
+--------------------------+------+-----------------+
| Araseli Montelongo Activity  | PP   | [phone] |
| Professional             |      |                 |
+--------------------------+------+-----------------+
 
 
 
 Allergies
 
 
+---------------------+----------------------+----------+----------+----------------------+
| Active Allergy      | Reactions            | Severity | Noted    | Comments             |
|                     |                      |          | Date     |                      |
+---------------------+----------------------+----------+----------+----------------------+
| Digoxin And Related | Other (See Comments) | High     | 20 |   Toxic, patient     |
|                     |                      |          | 18       | states her eyes were |
|                     |                      |          |          |  also affected "she  |
|                     |                      |          |          | seen yellow          |
|                     |                      |          |          | everywhere"          |
+---------------------+----------------------+----------+----------+----------------------+
| Morphine            | Other (See Comments) | High     | 20 |   Hallucinations     |
|                     |                      |          | 18       |                      |
+---------------------+----------------------+----------+----------+----------------------+
| Pantoprazole Sodium | Nausea And Vomiting  | Medium   | 20 |                      |
|                     |                      |          | 18       |                      |
+---------------------+----------------------+----------+----------+----------------------+
| Penicillins         | Hives                | Medium   | 20 |                      |
|                     |                      |          | 18       |                      |
+---------------------+----------------------+----------+----------+----------------------+
| Pseudoephedrine     | Anxiety              | High     | 20 |                      |
|                     |                      |          | 18       |                      |
+---------------------+----------------------+----------+----------+----------------------+
| Quinapril Hcl       | Anaphylaxis          | High     | 20 |                      |
|                     |                      |          | 18       |                      |
+---------------------+----------------------+----------+----------+----------------------+
 
 
 
 Current Medications
 
 
+----------------------+----------------------+-----------+---------+------+------+-------+
| Prescription         | Sig.                 | Disp.     | Refills | Star | End  | Statu |
|                      |                      |           |         | t    | Date | s     |
|                      |                      |           |         | Date |      |       |
+----------------------+----------------------+-----------+---------+------+------+-------+
|   cholecalciferol    | Take 1 capsule by    |           |         |      |      | Activ |
| (VITAMIN D-3) 5000   | mouth Every other    |           |         |      |      | e     |
| units TABS           | day.                 |           |         |      |      |       |
+----------------------+----------------------+-----------+---------+------+------+-------+
|   nortriptyline      | Take 25 mg by mouth  |           |         |      |      | Activ |
| (PAMELOR) 25 mg      | 2 times daily. 1     |           |         |      |      | e     |
 
| capsule              | tablet in AM and 3   |           |         |      |      |       |
|                      | tablets in Evening   |           |         |      |      |       |
+----------------------+----------------------+-----------+---------+------+------+-------+
|   BD PEN NEEDLE LISA | Inject 4 Sticks      |           |         | 01/0 |      | Activ |
|  U/F 32G X 4 MM MISC | under the skin 4     |           |         | 9/20 |      | e     |
|                      | times daily as       |           |         | 18   |      |       |
|                      | needed.              |           |         |      |      |       |
+----------------------+----------------------+-----------+---------+------+------+-------+
|   oxybutynin         | Take 2.5 mg by mouth |           |         |      |      | Activ |
| (DITROPAN) 5 mg      |  2 times daily.      |           |         |      |      | e     |
| tablet               |                      |           |         |      |      |       |
+----------------------+----------------------+-----------+---------+------+------+-------+
|   prochlorperazine   | Take 5 mg by mouth   |           |         |      |      | Activ |
| (COMPAZINE) 5 mg     | Twice  daily as      |           |         |      |      | e     |
| tablet               | needed.              |           |         |      |      |       |
+----------------------+----------------------+-----------+---------+------+------+-------+
|   rOPINIRole         | Take 0.75 mg by      |           |         |      |      | Activ |
| (REQUIP) 0.25 mg     | mouth nightly. Takes |           |         |      |      | e     |
| tablet               |  3 tablets at night  |           |         |      |      |       |
+----------------------+----------------------+-----------+---------+------+------+-------+
|   insulin aspart     | Inject 1-6 Units     |   10 mL   | 0       | 02/2 |      | Activ |
| (NOVOLOG FLEXPEN)    | under the skin 3     |           |         | 2/20 |      | e     |
| 100 units/mL         | times daily (with    |           |         | 18   |      |       |
| injection            | meals). CORRECTION   |           |         |      |      |       |
| penIndications: Type | SCALE: Novolog 0-6   |           |         |      |      |       |
|  2 diabetes mellitus | units before meals,  |           |         |      |      |       |
|  with other          | bedtime SC.  Add to  |           |         |      |      |       |
| specified            | nutritional dose if  |           |         |      |      |       |
| complication, with   | applicable.  [BG <   |           |         |      |      |       |
| long-term current    | 150: None]  [BG      |           |         |      |      |       |
| use of insulin (HCC) | 150-200: 1 units]    |           |         |      |      |       |
|                      | [-250: 2       |           |         |      |      |       |
|                      | units]  [-300: |           |         |      |      |       |
|                      |  3 units]  [BG       |           |         |      |      |       |
|                      | 301-350: 4 units]    |           |         |      |      |       |
|                      | [-400: 5       |           |         |      |      |       |
|                      | units]  [BG > 400: 6 |           |         |      |      |       |
|                      |  units and call PCP] |           |         |      |      |       |
+----------------------+----------------------+-----------+---------+------+------+-------+
|   aspirin 81 mg      | Take 1 tablet by     |   90      | 0       | 02/2 |      | Activ |
| chewable tablet      | mouth Daily.         | tablet    |         | 4/20 |      | e     |
|                      |                      |           |         | 18   |      |       |
+----------------------+----------------------+-----------+---------+------+------+-------+
|   insulin degludec   | Inject 4 Units under |   1 pen   | 3       | 03/1 |      | Activ |
| (TRESIBA FLEXTOUCH)  |  the skin every      |           |         | 2/20 |      | e     |
| 100 units/mL         | evening.             |           |         | 18   |      |       |
| injectionIndications |                      |           |         |      |      |       |
| : Type 2 diabetes    |                      |           |         |      |      |       |
| mellitus with        |                      |           |         |      |      |       |
| chronic kidney       |                      |           |         |      |      |       |
| disease on chronic   |                      |           |         |      |      |       |
| dialysis, with       |                      |           |         |      |      |       |
| long-term current    |                      |           |         |      |      |       |
| use of insulin (HCC) |                      |           |         |      |      |       |
+----------------------+----------------------+-----------+---------+------+------+-------+
|   acetaminophen      | Take 2 tablets by    |   120     | 0       | 03/1 |      | Activ |
| (TYLENOL) 325 mg     | mouth every 4 hours  | tablet    |         | 6/20 |      | e     |
| tablet               | as needed for Pain   |           |         | 18   |      |       |
|                      | (or fever >= 38.6 C  |           |         |      |      |       |
|                      | (101.5 F)).          |           |         |      |      |       |
 
+----------------------+----------------------+-----------+---------+------+------+-------+
|   LORazepam (ATIVAN) | Take 1 tablet by     |   60      | 0       | 03/1 |      | Activ |
|  1 mg tablet         | mouth every 8 hours  | tablet    |         | 6/20 |      | e     |
|                      | as needed for        |           |         | 18   |      |       |
|                      | Anxiety.             |           |         |      |      |       |
+----------------------+----------------------+-----------+---------+------+------+-------+
|   albuterol 5 mg/mL  | Take 0.5 mLs by      |   20 vial | 0       | 03/1 |      | Activ |
| nebulizer solution   | nebulization every 4 |           |         | 6/20 |      | e     |
|                      |  hours as needed for |           |         | 18   |      |       |
|                      |  Shortness of Breath |           |         |      |      |       |
|                      |  or Increased Work   |           |         |      |      |       |
|                      | of Breathing.        |           |         |      |      |       |
+----------------------+----------------------+-----------+---------+------+------+-------+
|   losartan (COZAAR)  | Take 100 mg by mouth |           |         |      |      | Activ |
| 100 MG tablet        |  Daily.              |           |         |      |      | e     |
+----------------------+----------------------+-----------+---------+------+------+-------+
|   esomeprazole       | 2 times daily.       |           |         | 05/0 |      | Activ |
| (NEXIUM) 40 mg       |                      |           |         | 2/20 |      | e     |
| capsule              |                      |           |         | 18   |      |       |
+----------------------+----------------------+-----------+---------+------+------+-------+
|   TRINTELLIX 10 MG   | Daily.               |           |         | 04/2 |      | Activ |
| tablet               |                      |           |         | 4/20 |      | e     |
|                      |                      |           |         | 18   |      |       |
+----------------------+----------------------+-----------+---------+------+------+-------+
|   nitroglycerin      | Place 1 tablet under |   100     | 3       | 05/0 | 05/0 | Activ |
| (NITROSTAT) 0.4 mg   |  the tongue every 5  | tablet    |         | /20 | 7/20 | e     |
| SL tablet            | minutes as needed.   |           |         | 18   | 19   |       |
+----------------------+----------------------+-----------+---------+------+------+-------+
|   atorvaSTATin       | Take 1 tablet by     |           |         | 05/1 |      | Activ |
| (LIPITOR) 80 MG      | mouth nightly.       |           |         | 8/20 |      | e     |
| tablet               |                      |           |         | 18   |      |       |
+----------------------+----------------------+-----------+---------+------+------+-------+
|   docusate-senna     | Take 1 tablet by     |           |         |      |      | Activ |
| (SENOKOT-S) 50-8.6   | mouth as needed for  |           |         |      |      | e     |
| mg per tablet        | Constipation.        |           |         |      |      |       |
+----------------------+----------------------+-----------+---------+------+------+-------+
|   furosemide (LASIX) | TAKE 1 TABLET BY     |   30      | 3       | 09/0 |      | Activ |
|  20 mg tablet        | MOUTH ONCE DAILY     | tablet    |         | /20 |      | e     |
|                      |                      |           |         | 18   |      |       |
+----------------------+----------------------+-----------+---------+------+------+-------+
|                      | Take 1 tablet by     |           |         | 08/2 |      | Activ |
| HYDROcodone-acetamin | mouth as needed.     |           |         | 1/20 |      | e     |
| ophen (NORCO) 5-325  |                      |           |         | 18   |      |       |
| mg per tablet        |                      |           |         |      |      |       |
+----------------------+----------------------+-----------+---------+------+------+-------+
|   ondansetron        | Take 1 tablet by     |           |         | 08/2 |      | Activ |
| (ZOFRAN ODT) 4 mg    | mouth as needed.     |           |         | 1/20 |      | e     |
| disintegrating       |                      |           |         | 18   |      |       |
| tablet               |                      |           |         |      |      |       |
+----------------------+----------------------+-----------+---------+------+------+-------+
|   warfarin           | Take 2 mg by mouth   |           |         |      |      | Activ |
| (COUMADIN) 2 mg      | Daily. 1 /2 tablet  |           |         |      |      | e     |
| tablet               | daily                |           |         |      |      |       |
+----------------------+----------------------+-----------+---------+------+------+-------+
|   traMADol (ULTRAM)  | Take 50 mg by mouth  |           |         |      |      | Activ |
| 50 mg tablet         | as needed.           |           |         |      |      | e     |
+----------------------+----------------------+-----------+---------+------+------+-------+
|   carvedilol (COREG) | Take 1.5 tablets by  |   90      | 5       | 09/ |      | Activ |
|  12.5 mg tablet      | mouth 2 times daily  | tablet    |         | 2/20 |      | e     |
|                      | (with breakfast &    |           |         | 18   |      |       |
 
|                      | dinner).             |           |         |      |      |       |
+----------------------+----------------------+-----------+---------+------+------+-------+
|   ondansetron        | Take 1 tablet by     |   24      | 0       | 09/1 |      | Activ |
| (ZOFRAN ODT) 4 mg    | mouth every 8 hours  | tablet    |         |  |      | e     |
| disintegrating       | as needed for        |           |         | 18   |      |       |
| tablet               | Nausea.              |           |         |      |      |       |
+----------------------+----------------------+-----------+---------+------+------+-------+
 
 
 
 Active Problems
 
 
+------------+------------+
| Problem    | Noted Date |
+------------+------------+
| Chest pain | 2018 |
+------------+------------+
 
 
 
+-------------------------------------------------------------------+
|   Overview:   Chest XR 9/15/2018 - Findings are suggestive of     |
| mild pulmonary edema with small pleural effusions.  Borderline    |
| cardiomegaly. Edward Dailey MD Stress test done on 18     |
| shows pharmacologic stress notable for baseline abnormal ECG      |
| without diagnostic changes post stress, mild LV enlargement with  |
| LVEF 33% reviewed, perfusion imaging notable for moderate sized   |
| high intensity area of reduced uptake involving the mid to apical |
|  inferior myocardium as well as the LV apex with minimal          |
| reversibility, consistent with patient's known occluded RCA,  no  |
| significant additional ischemia is noted, overall this is         |
| consistent with the patient's known history of multivessel CAD    |
| and ischemic cardiomyopathy.X-Ray chest 2/10/2019 shows small     |
| bilateral pleural effusions with some mild probable bibasilar     |
| atelectasis, no fulminant pulmonary edema or focal infiltrate is  |
| seen, by Tip Pinto DO.                                  |
+-------------------------------------------------------------------+
 
 
 
+-------------------------------------+------------+
| ACS (acute coronary syndrome) ([facility]) | 09/15/2018 |
+-------------------------------------+------------+
 
 
 
+-------------------------------------------------------------------+
|   Overview:   Echocardiogram on 2018 shows, mild left        |
| ventricular chamber dilatation with upper normal wall thickness,  |
| moderate global left ventricular systolic dysfunction with a      |
| visually estimated left ventricular ejection fraction of 30-35%,  |
| mild left atrial chamber enlargement, surgically repaired mitral  |
| valve with normal function, mild aortic valve regurgitation, mild |
|  pulmonary arterial hypertension, when compared with prior        |
| echocardiogram dated 18, no significant interval change, by |
|  Dejan Breaux MD.                                               |
+-------------------------------------------------------------------+
 
 
 
 
+------------------------------------------------------------------+------------+
| Implantable defibrillator reprogramming/check                    | 2018 |
+------------------------------------------------------------------+------------+
| ICD (implantable cardioverter-defibrillator) [employer]   | 2018 |
| 18 Dora                                                 |            |
+------------------------------------------------------------------+------------+
 
 
 
+------------------------------------------------------------------+
|   Overview:   Formatting of this note may be different from the  |
| original. MODEL NAME MODEL# SERIAL# DATE IMPLANTED GENERATOR     |
| [employer] Dynagen EL ICD DF4 [id number] [id number] 18 RV LEAD  |
| [employer] [place] 4 Site SG 0292 [id number] 18          |
| Indication: Nonischemic dilated cardiopathy with persistent      |
| severely depressed left ventricular systolic function, LVEF of   |
| LVEF of 30%                                                      |
+------------------------------------------------------------------+
 
 
 
+------------------+------------+
| Pleural effusion | 2018 |
+------------------+------------+
 
 
 
+-------------------------------------------------------------------+
|   Last Assessment & Plan:   Post MV repair and CABG x2 2018  |
| and recurrent pleural effusions and elevated ESR. Plan:Received 1 |
|  time dose of Colchicine Started on Prednisone per surgery CXR    |
| today shows stable bilateral pleural effusions                    |
|Started on Prednisone per surgery                                  |
|CXR today shows stable bilateral pleural effusions                 |
+-------------------------------------------------------------------+
 
 
 
+---------------------------------------------+------------+
| Steroid-induced hyperglycemia               | 2018 |
+---------------------------------------------+------------+
| Moderate protein-calorie malnutrition (HCC) | 2018 |
+---------------------------------------------+------------+
| Chronic systolic heart failure (HCC)        | 2018 |
+---------------------------------------------+------------+
 
 
 
+-------------------------------------------------------------------+
|   Overview:   Echocardiogram 2018 shows mild biatrial        |
| dilatation, mild left ventricular dilatation with a mild          |
| eccentric left ventricular hypertrophy, there is a moderate       |
| global hypokinesis of left ventricle, lvef is 30-35%, normal      |
| right ventricular size and wall thickness with a mildly reduced   |
| right ventricular systolic function, mildly thickened and         |
| calcified trileaflet aortic valve with adequate opening, there is |
|  a mild aortic valve insufficiency, mildly thickened and          |
| calcified mitral valve suggesting myxomatous change with evidence |
|  of mitral valve repair, there is at least moderate central       |
 
| mitral valve regurgitation, mild tricuspid valve regurgitation,   |
| mild to moderate pulmonary hypertension with a peak systolic      |
| pressure of 50-55 mmhg, normal ivc with normal respiratory        |
| collapse, when compared to echocardiography on 3/7/18, left       |
| ventricular systolicsystolic function is slightly                 |
| improved.Echocardiogram 2018 show overall left ventricular    |
| systolic function is severely impaired with, an EF between 20 -   |
| 25 %, there is mild aortic regurgitation, there is mild aortic    |
| stenosis present, mild-to-moderate tricuspid regurgitation        |
| present, there is mild pulmonary hypertension, pleural effusion   |
| present. Duglas Ornelas MD, FACC; [facility]   Last Assessment & Plan:  |
|   EF 25% s/p CABG on 18 but now down to 15%. Plan:Continue   |
| Coreg and Losartan Hemodialysis for fluid removal.Strict I/O and  |
| daily weights.                                                    |
+-------------------------------------------------------------------+
 
 
 
+------------------------------------------------+------------+
| Stress hyperglycemia                           | 2018 |
+------------------------------------------------+------------+
| Anemia in ESRD (end-stage renal disease) ([facility]) | 2018 |
+------------------------------------------------+------------+
| PAF (paroxysmal atrial fibrillation) ([facility])     | 2018 |
+------------------------------------------------+------------+
 
 
 
+-------------------------------------------------------------------+
|   Overview:   PAF with dialysis in the past and recurrent post op |
|  MV/CABG surgery.   Last Assessment & Plan:   Remains in          |
| NSRNormal atrial size by echocardiogram. Plan:Continue Coreg with |
|  up-titration as toleratedAmiodarone 400 mg BID while inpatient   |
| and then transition to 200 mg BID for 2 weeks and then 200 mg     |
| daily. Continue po 1-3 months post discharge. Continue warfarin   |
| for CVA prophylaxis, INR 2.3 today                                |
|Continue warfarin for CVA prophylaxis, INR 2.3 today               |
+-------------------------------------------------------------------+
 
 
 
+-------------------------+------------+
| S/P mitral valve repair | 2018 |
+-------------------------+------------+
 
 
 
+-------------------------------------------------------------------+
|   Overview:   Cristopher ring 2.16.18 ([facility]) for severe MR. POLY    |
| left. CABG too, SVG x 2 to OM and RCA.  Last Assessment & Plan:   |
|  Cristopher ring 2.16.18 ([facility] Labsco) for severe MR. POLY left. CABG too, |
|  SVG x 2 to OM and RCA.                                           |
+-------------------------------------------------------------------+
 
 
 
+--------------+------------+
| S/P CABG x 2 | 2018 |
+--------------+------------+
 
 
 
 
+------------------------------------------------------------------+
|   Overview:   SVG's to OM and RCA at time of MV ring Cristopher    |
| #28. POLY left.  Last Assessment & Plan:   18: Mitral valve  |
| repair with a 28 Cristopher annuloplasty ring; CABG x2 (SVG to OM  |
| and SVG to RCA)Plan:ASA, statin, BB and ARB                      |
|ASA, statin, BB and ARB                                           |
+------------------------------------------------------------------+
 
 
 
+----------------------------------------+------------+
| Cardiomyopathy, unspecified type ([facility]) | 2018 |
+----------------------------------------+------------+
 
 
 
+-------------------------------------------------------------------+
|   Overview:   Ischemic (3 V CAD) and non ischemic (Hypertension   |
| and MR and DM and ESRD). Tolerated metoprolol, losartan but avoid |
|  spironolactone due to ESRD. Echocardiogram done on 10/24/18      |
| shows mild biatrial dilatation, mild left ventricular dilatation  |
| with a mild eccentric left ventricular hypertrophy, there is a    |
| severe global hypokinesis of the left ventricle, overall, left    |
| ventricular systolic function is severely decreased, LVEF is      |
| 25-30%, mildly thickened and calcified trileaflet aortic valve    |
| with adequate opening, there is aortic valve sclerosis without    |
| significant aortic valve stenosis, there is a mild aortic valve   |
| insufficiency, mildly thickened and calcified mitral valve        |
| suggesting myxoma change with evidence of mitral valve repair,    |
| there is a mild to mitral valve stenosis and a mild mitral valve  |
| regurgitation, mild mitral annular calcification, mild tricuspid  |
| valve regurgitation, mild pulmonary hypertension with a peak      |
| systolic pressure 40-45 mmHg, normal IVC with normal respiratory  |
| collapse, when compared to echocardiography on 18, left      |
| ventricular systolic function continued to be worsened. Johnie    |
| MD Dora  Last Assessment & Plan:   Severe ischemic          |
| cardiomyopathy (EF 15%) and ESRD on HD who presented with dyspnea |
|  related to bilateral pleural effusions and episodic AF with RVR  |
| now s/p thoracentesis with significant improvement in her         |
| symptoms and in NSR. Plan:Volume removal with dialysis as         |
| toleratedLasix 80 mg twice a day Coreg and low dose losartan. Up  |
| titrate as tolerated but working on fluid removal and maintaining |
|  adequate BP. Strict I/O with daily weights.                      |
+-------------------------------------------------------------------+
 
 
 
+--------------------------------------------------------------+---+
| NSTEMI (non-ST elevated myocardial infarction) (HCC)         |   |
+--------------------------------------------------------------+---+
| Coronary artery disease involving native coronary artery of  |   |
| native heart without angina pectoris                         |   |
+--------------------------------------------------------------+---+
 
 
 
+-------------------------------------------------------------------+
|   Overview:   Stress test on 2018 shows pharmacologic stress |
 
|  notable for baseline abnormal ECG without diagnostic changes     |
| post stress, mild LV enlargement with LVEF 33% reviewed,          |
| perfusion imaging notable for moderate sized high intensity area  |
| of reduced uptake involving the mid to apical inferior myocardium |
|  as well as the LV apex with minimal reversibility, consistent    |
| with patient's known occluded RCA, no significant additional      |
| ischemia is noted, overall this is consistent with the patient's  |
| known history of multivessel CAD and ischemic cardiomyopathy, by  |
| Anthony Gunn MD.Echocardiogram on 2018 shows mild left     |
| ventricular chamber dilatation with upper normal wall thickness,  |
| moderate global left ventricular systolic dysfunction with a      |
| visually estimated left ventricular ejection fraction of 30-35%,  |
| mild left atrial chamber enlargement, surgically repaired mitral  |
| valve with normal function, mild aortic valve regurgitation, mild |
|  pulmonary arterial hypertension, when compared with prior        |
| echocardiogram dated 18, no significant interval change, by |
|  Dejan Breaux MD. [facility] on 2012 shows totally occluded right |
|  coronary artery with collateral filling from the left, no        |
| significant stenoses within the left anterior descending artery   |
| and circumflex artery. Patent stents within the left anterior     |
| descending artery, normal intracardiac pressure, mild narrowing   |
| within the distal aorta and iliac arteries. - Sherie Bartholomew,   |
| MDEchocardiogram on 2017 shows Study: a 2-dimensional        |
| transthoracic echocardiogram with m-mode, spectral and color flow |
|  Doppler was performed, left ventricular systolic function is     |
| low-normal with, an EF between 50 - 55 %, the left ventricle      |
| cavity size is normal, the RV is normal in size and function, the |
|  left atrium is normal in size, the right atrium is normal in     |
| size, aortic valve is trileaflet and is mildly thickened, there   |
| is mild aortic regurgitation, there is no evidence of aortic      |
| stenosis, the mitral valve is normal. - OLIVA Huffman |
|  on 2017 shows severe triple-vessel coronary artery disease   |
| with moderate ostial left anterior descending artery and patent   |
| stent in the midsegment, moderate stenosis in the second obtuse   |
| marginal branch and total occlusion of the proximal right         |
| coronary artery, which is known from before, given the patient's  |
| symptoms at this time, recommendation given. The patient          |
| understands. We will proceed with further medical management with |
|  blood pressure control. Also, we will optimize our blood         |
| pressure management. Further evaluation for the ostial left       |
| anterior descending artery and circumflex lesion upon recurrent   |
| symptoms for the patient, the patient will be evaluated for any   |
| further symptoms and fractional flow reserve of the left anterior |
|  descending artery and possible intervention on the left          |
| circumflex system will be considered. - OLIVA Diane   |
| on 2017 shows severe 3-vessel coronary artery disease with   |
| moderate to severe proximal left anterior descending with         |
| significant fractional flow reserve value of 0.77, severe mid     |
| large marginal branch, and chronically occluded right coronary    |
| artery with left-to-right collaterals, normal left ventricular    |
| end-diastolic pressure. - OLIVA Lambert on 2017     |
| shows three-vessel coronary artery disease with a chronically     |
| occluded right coronary artery with left-to-right collaterals,    |
| severe proximal left anterior descending artery with a patent     |
| stent in the mid left anterior descending artery and a            |
| significant FFR value of 0.77 during diagnostic angiogram on      |
| 2017, and severe disease of the second marginal branch |
|  of the left circumflex artery, successful PTCA and stenting of   |
| the second marginal branch of the left circumflex artery using a  |
| 2.25 x 16 and a 2.25 x 8 mm overlapping synergy drug-eluting      |
 
| stents postdilated using a 2.25 noncompliant balloon, successful  |
| direct stenting of the proximal and ostium of the left anterior   |
| descending artery using a 3.5 x 16 mm Synergy drug-eluting stent  |
| postdilated using a 3.5 noncompliant balloon. - Marcos Gamboa, |
|  MDEchocardiogram on 2018 shows the left ventricle is normal |
|  in size, wall thickness and moderately impaired systolic         |
| function EF 35-40%. Inferior, inferolateral and anterolateral     |
| hypokinesis, the right ventricle is normal in size and function,  |
| severe mitral regurgitation with moderately dilated left atrium,  |
| mild tricuspid regurgitation and mild pulmonary hypertension RVSP |
|  48 mmHg, there is no pericardial effusion, new wall motion       |
| abnormalities and new severe mitral regurgitation compared to     |
| prior study. - Celestino Porras [facility] on 2018 shows         |
| three-vessel coronary artery disease with widely patent stent in  |
| the left anterior descending artery and severe stent restenosis   |
| in the marginal branch, occluded right coronary artery with       |
| collateral from left to right, severe left ventricular            |
| dysfunction with severe mitral regurgitation, status post         |
| percutaneous transluminal coronary angioplasty of in-stent        |
| restenosis within the marginal branch, recommend consideration    |
| for mitral valve replacement and 2-vessel coronary artery bypass  |
| surgery to the right coronary artery and marginal branch. - Iyad  |
| MD DustinTEE and CABG x2 on 2018 shows Severely reduced LV  |
| systolic function. Visually estimated LVEF 25%, normal LV size    |
| and shape. Normal wall thickness, normal RV size with normal      |
| function, LA enlarged. POLY normal size without SEC, masses, or    |
| thrombi. IAS intact, no PFO detected, mitral valve with bileaflet |
|  thickening and severely restricted motion, associated with       |
| ventricular tethering. Severe functional MR with central jet,     |
| trileaflet aortic valve with mild sclerosis. No significant       |
| aortic stenosis. Mild AI with central jet, tricuspid valve normal |
|  structure and function. Trace TR, pulmonic valve normal Trace    |
| ID, visible portions of ascending aorta and arch normal. Grade II |
|  atherosclerotic disease of descending aorta, pulmonary artery    |
| normal, normal pericardium. No pericardial or pleural effusions,  |
| left ventricular function slightly improved and right ventricular |
|  function unchanged compared to preop,  28 mm mitral valve        |
| annuloplasty ring is well-seated with an acceptable inflow        |
| gradient across the mitral valve and no MR noted, no change in    |
| the aortic, tricuspid, or pulmonic valves compared to preop, no   |
| change in visible portions of aorta after decannulation, LV and   |
| RV function unchanged after sternal closure. - Jagjit Hickey,       |
| MDEchocardiogram on 3/7/2018 shows a complete two-dimensional     |
| transthoracic echocardiogram was performed (2D, M-mode, Doppler   |
| and color flow Doppler), left ventricular systolic function is    |
| severely reduced, the visually estimated LV ejection fraction is  |
| 15%, the left and right atrial chambers are both normal in size,  |
| there is mild mitral regurgitation, an annuloplasty ring is noted |
|  in the mitral position, there is mild tricuspid regurgitation,   |
| Estimated PA pressure is 42 mmHg, the aortic valve leaflets       |
| appear mildly calcified, the aortic valve opens well, the aortic  |
| valve mean systolic gradient was measured at 9 mm Hg. - Star    |
| MD Alvarez  Last Assessment & Plan:   [facility]ORGI in [facility] 5 months |
|  ago. 1 week off Plavix for CAGG/MVr surgery 2.16. Now and in the |
|  past with PAFAlso statin - data in dialysis patients for primary |
|  prevention with statin is not beneficial but this is secondary   |
| prevention. However, instead of high dose statin, moderate dose   |
| due to ESRD with slight increased risk of rhabdo. - Warfarin -    |
| ASA - Moderate dose Statin                                        |
+-------------------------------------------------------------------+
 
 
 
 
+----------------------+---+
| Mixed hyperlipidemia |   |
+----------------------+---+
 
 
 
+-------------------------------------------------------------+
|   Last Assessment & Plan:   Moderate dose statin and don't  |
| titrate due to ESRD and ASHD. - Continue Atorvastatin 20mg  |
+-------------------------------------------------------------+
 
 
 
+------------------------------------------------------------------+---+
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
| dialysis, with long-term current use of insulin ([facility])            |   |
+------------------------------------------------------------------+---+
| ESRD (end stage renal disease) (HCC)                             |   |
+------------------------------------------------------------------+---+
 
 
 
+--------------------------------------------------------+
|   Last Assessment & Plan:   Dialysis Tues, Thurs, Sat. |
| 2 L removal on HD yesterday and 1.1 L today            |
| Nephrology following.                                  |
| Chronic ESRD anemia.                                   |
|                                                        |
| Plan:                                                  |
| Continue Lasix and HD                                  |
+--------------------------------------------------------+
 
 
 
+-------------------------+---+
| Hypertension, essential |   |
+-------------------------+---+
 
 
 
+------------------------------------------------------------------+
|   Last Assessment & Plan:   Will resume metoprolol and losartan  |
| as BP allows.                                                    |
+------------------------------------------------------------------+
 
 
 
+----------------------------------------------------+---+
| COPD (chronic obstructive pulmonary disease) (HCC) |   |
+----------------------------------------------------+---+
| Severe mitral regurgitation                        |   |
+----------------------------------------------------+---+
 
 
 
 Resolved Problems
 
 
 
+-------------------------------+----------+-----------+
| Problem                       | Noted    | Resolved  |
|                               | Date     | Date      |
+-------------------------------+----------+-----------+
| Junctional cardiac arrhythmia | 20 |  |
|                               | 18       | 8         |
+-------------------------------+----------+-----------+
 
 
 
+----------------------------------------------------------------+
|   Last Assessment & Plan:   Persistent. Will stop Amiodarone.  |
+----------------------------------------------------------------+
 
 
 
 Encounters
 
 
+--------+-----------+-----------+----------------------+----------------------+
| Date   | Type      | Specialty | Care Team            | Description          |
+--------+-----------+-----------+----------------------+----------------------+
| / | Telephone |           |   Johnie John, | Other (angina)       |
|    |           |           |  MD                  |                      |
+--------+-----------+-----------+----------------------+----------------------+
| / | Implant   |           |   Johnie John, | Remote Device        |
|    | Monitor   |           |  MD                  | Interrogation        |
|        |           |           |                      | (Primary Dx); ICD    |
|        |           |           |                      | (implantable         |
|        |           |           |                      | cardioverter-defibri |
|        |           |           |                      | llator) [employer]       |
|        |           |           |                      | Scientific  18   |
|        |           |           |                      | Dora;           |
|        |           |           |                      | Cardiomyopathy,      |
|        |           |           |                      | unspecified type     |
|        |           |           |                      | (HCC)                |
+--------+-----------+-----------+----------------------+----------------------+
| / | Telephone |           |   Johnie John, | Lab Order (Pt due    |
|    |           |           |  MD                  | for labs prior to    |
|        |           |           |                      | appt. )              |
+--------+-----------+-----------+----------------------+----------------------+
| / | Telephone |           |   Johnie John, | Other (medication    |
2019   |           |           |  MD                  | changed due to ER    |
|        |           |           |                      | visit)               |
+--------+-----------+-----------+----------------------+----------------------+
| / | Telephone |           |   Johnie John, | Other ([facility])     |
|    |           |           |  MD                  |                      |
+--------+-----------+-----------+----------------------+----------------------+
| / | Telephone |           |   Abhijit,        | Other (heart issues) |
|    |           |           | JANETTE Plunkett         |                      |
+--------+-----------+-----------+----------------------+----------------------+
 from Last 3 Months
 
 Family History
 
 
+------------------+-----------+------+----------+
| Medical History  | Relation  | Name | Comments |
+------------------+-----------+------+----------+
 
| High cholesterol | Father    |      |          |
+------------------+-----------+------+----------+
| Hypertension     | Father    |      |          |
+------------------+-----------+------+----------+
| PVD              | Father    |      |          |
+------------------+-----------+------+----------+
| Dementia         | Mother    |      |          |
+------------------+-----------+------+----------+
| Diabetes         | Paternal  |      |          |
|                  | Grandmoth |      |          |
|                  | er        |      |          |
+------------------+-----------+------+----------+
| Kidney disease   | Neg Hx    |      |          |
+------------------+-----------+------+----------+
 
 
 
+----------------------+------+----------+----------+
| Relation             | Name | Status   | Comments |
+----------------------+------+----------+----------+
| Father               |      |  | PVD      |
+----------------------+------+----------+----------+
| Mother               |      |  | Dementia |
+----------------------+------+----------+----------+
| Paternal Grandmother |      |  |          |
+----------------------+------+----------+----------+
 
 
 
 Social History
 
 
+---------------+------------+-----------+--------+------------------+
| Tobacco Use   | Types      | Packs/Day | Years  | Date             |
|               |            |           | Used   |                  |
+---------------+------------+-----------+--------+------------------+
| Former Smoker | Cigarettes | 1         | 30     | Quit: 2004 |
+---------------+------------+-----------+--------+------------------+
 
 
 
+---------------------+---+---+---+
| Smokeless Tobacco:  |   |   |   |
| Never Used          |   |   |   |
+---------------------+---+---+---+
 
 
 
+-------------+-----------+---------+----------+
| Alcohol Use | Drinks/We | oz/Week | Comments |
|             | ek        |         |          |
+-------------+-----------+---------+----------+
| No          |           |         |          |
+-------------+-----------+---------+----------+
 
 
 
+------------------+---------------+
| Sex Assigned at  | Date Recorded |
| Birth            |               |
 
+------------------+---------------+
| Not on file      |               |
+------------------+---------------+
 
 
 
 Last Filed Vital Signs
 
 
+-------------------+----------------------+---------------------+
| Vital Sign        | Reading              | Time Taken          |
+-------------------+----------------------+---------------------+
| Blood Pressure    | 125/55               | 20185 PDT |
+-------------------+----------------------+---------------------+
| Pulse             | 62                   | 20185 PDT |
+-------------------+----------------------+---------------------+
| Temperature       | 37   C (98.6   F)    | 2018 PDT |
+-------------------+----------------------+---------------------+
| Respiratory Rate  | 16                   | 2018 PDT |
+-------------------+----------------------+---------------------+
| Oxygen Saturation | 97%                  | 2018 PDT |
+-------------------+----------------------+---------------------+
| Inhaled Oxygen    | -                    | -                   |
| Concentration     |                      |                     |
+-------------------+----------------------+---------------------+
| Weight            | 65.3 kg (143 lb 15.4 | 2018 0600 PDT |
|                   |  oz)                 |                     |
+-------------------+----------------------+---------------------+
| Height            | 177.8 cm (5' 10")    | 09/15/2018 0045 PDT |
+-------------------+----------------------+---------------------+
| Body Mass Index   | 20.66                | 2018 0600 PDT |
+-------------------+----------------------+---------------------+
 
 
 
 Plan of Treatment
 
 
+--------+---------+-----------+----------------------+-------------+
| Date   | Type    | Specialty | Care Team            | Description |
+--------+---------+-----------+----------------------+-------------+
| / | Office  |           |   Johnie John, |             |
|    | Visit   |           |  MD  [address] |             |
|        |         |           |  [place],   |             |
|        |         |           | [place] [address]             |             |
|        |         |           | [phone]         |             |
|        |         |           | [phone] (Fax)   |             |
+--------+---------+-----------+----------------------+-------------+
 
 
 
+----------------------+-----------+------------+----------+
| Health Maintenance   | Due Date  | Last Done  | Comments |
+----------------------+-----------+------------+----------+
| Hepatitis C          |  |            |          |
| Screening            | 9         |            |          |
+----------------------+-----------+------------+----------+
| Diabetic Eye Exam    |  |            |          |
|                      | 7         |            |          |
+----------------------+-----------+------------+----------+
 
| Diabetic Foot Exam   |  |            |          |
|                      | 7         |            |          |
+----------------------+-----------+------------+----------+
| Vaccine:             |  |            |          |
| Dtap/Tdap/Td (1 -    | 8         |            |          |
| Tdap)                |           |            |          |
+----------------------+-----------+------------+----------+
| BREAST CANCER        |  |            |          |
| SCREENING (MAMM Q2   | 9         |            |          |
| YEARS 50-74)         |           |            |          |
+----------------------+-----------+------------+----------+
| Colorectal Cancer    |  |            |          |
| Screening            | 9         |            |          |
| (Colonoscopy)        |           |            |          |
+----------------------+-----------+------------+----------+
| Vaccine: Zoster (1   |  |            |          |
| of 2)                | 9         |            |          |
+----------------------+-----------+------------+----------+
| Vaccine:             |  | 2012 |          |
| Pneumococcal 65+     | 4         |            |          |
| High/Highest Risk (1 |           |            |          |
|  of 2 - PCV13)       |           |            |          |
+----------------------+-----------+------------+----------+
| Adult Annual         |  |            |          |
| Wellness Visit       | 6         |            |          |
+----------------------+-----------+------------+----------+
| Hemoglobin A1c Q3    | 05/15/201 | 02/15/2018 |          |
| Months               | 8         |            |          |
+----------------------+-----------+------------+----------+
| Vaccine: Influenza   |  |            |          |
| (Season Ended)       | 9         |            |          |
+----------------------+-----------+------------+----------+
 
 
 
 Implants
 
 
+-------------------------------+--------+-------+-------------+--------+--------+--------+
| Implanted                     | Type   | Area  | Manufacture | Device | Expira | Model  |
|                               |        |       | r           |        | tion   | /      |
|                               |        |       |             | Identi | Date   | Serial |
|                               |        |       |             | fier   |        |  / Lot |
+-------------------------------+--------+-------+-------------+--------+--------+--------+
| Ring Annlplst Cosgrv 28mm -   | Generi | N/A:  | CHRIS     |        | / | [id number] |
| [id number]: Qty: 1 on  | c      | Heart | LIFESCIENCE |        | 2   | MM     |
| 2018 by Anthony Beth   |        |       | [employer] -     |        |        | /[id number] |
| MD JIMMY                         |        |       | EDLS        |        |        | 34 /   |
+-------------------------------+--------+-------+-------------+--------+--------+--------+
| Dynagen El Icd Vr Implanted:  | Implan | N/A:  | BOSTON      |        | / | D150   |
| Qty: 1 on 2018 by       | table  | Chest | [employer]  |        |    | /[id number] |
| Johnie John MD         | Cardio |       | [facility] [employer] |        |        | 3      |
|                               | verter |       |             |        |        | / |
|                               |        |       |             |        |        | 9      |
|                               | Defibr |       |             |        |        |        |
|                               | illato |       |             |        |        |        |
|                               | r      |       |             |        |        |        |
+-------------------------------+--------+-------+-------------+--------+--------+--------+
| Endotak [place] Sg           | Lead   | N/A:  | BOSTON      |        | 10/21/ | 292   |
| Implanted: Qty: 1 on          |        | Chest | [employer]  |        |    | /[id number] |
 
| 2018 by Dora,      |        |       | [employer] [employer] |        |        | 5      |
| MD Johnie                    |        |       |             |        |        | /[id number] |
|                               |        |       |             |        |        | 0      |
+-------------------------------+--------+-------+-------------+--------+--------+--------+
 
 
 
 Procedures
 
 
+-----------------+--------+-------------+----------------------+----------------------+
| Procedure Name  | Priori | Date/Time   | Associated Diagnosis | Comments             |
|                 | ty     |             |                      |                      |
+-----------------+--------+-------------+----------------------+----------------------+
| DEVICE          | Routin | 2019  |   Remote Device      |   Results for this   |
| INTERROGATION-  | e      | 2359 PDT    | Interrogation   ICD  | procedure are in the |
| REMOTE          |        |             | (implantable         |  results section.    |
|                 |        |             | cardioverter-defibri |                      |
|                 |        |             | llator) [facility]       |                      |
|                 |        |             | Scientific  18   |                      |
|                 |        |             | Dora            |                      |
|                 |        |             | Cardiomyopathy,      |                      |
|                 |        |             | unspecified type     |                      |
|                 |        |             | ([facility])                |                      |
+-----------------+--------+-------------+----------------------+----------------------+
 from Last 3 Months
 
 Results
 Device Interrogation - Remote (2019)
 
+-----------------------------------------------------------------------+--------------+
| Narrative                                                             | Performed At |
+-----------------------------------------------------------------------+--------------+
|   This result has an attachment that is not available.  Johnie        |   [facility]    |
| MD Dora         2019 15:05Date of Remote Interrogation:    |              |
| 19 Refer to [employer] documentation and remote PDF scanned into    |              |
| EPIC for remote interrogation results.    Data collected by Clementina SALAZAR     |              |
| NAHUN Maynard Presenting rhythm:    sinus rhythm 59-60 beats.0           |              |
| ventricular high rate episodes. No tachycardia therapies recommended  |              |
| or delivered.Histogram    good.     Battery longevity                 |              |
| 12    years.Apparent normal and stable device function.Device         |              |
| interrogation due in office in 2019.                        |              |
|recommended or delivered.                                              |              |
|Histogram    good.     Battery longevity 12    years.                 |              |
|Apparent normal and stable device function.                            |              |
|Device interrogation due in office in 2019.                  |              |
|                                                                       |              |
+-----------------------------------------------------------------------+--------------+
 
 
 
+--------------+---------+--------------------+--------------+
| Performing   | Address | City/State/Zipcode | Phone Number |
| Organization |         |                    |              |
+--------------+---------+--------------------+--------------+
|   [facility]    |         |                    |              |
+--------------+---------+--------------------+--------------+
 from Last 3 Months
 
 Insurance
 
 
 
+-----------------+--------+-------------+--------+-------------+---------+
| Payer           | Benefi | Subscriber  | Type   | Phone       | Address |
|                 | t Plan | ID          |        |             |         |
|                 |  /     |             |        |             |         |
|                 | Group  |             |        |             |         |
+-----------------+--------+-------------+--------+-------------+---------+
| MEDICARE        | MEDICA | [id number]  | Medica | [phone]         |
|                 | RE     |             | re     | [phone]        |         |
|                 | PART A |             |        |             |         |
|                 |  AND B |             |        |             |         |
+-----------------+--------+-------------+--------+-------------+---------+
| MEDICAID [place] | MEDICA | [id number]    | Medica | [phone] |         |
|                 | [place]  |             | id     | [phone]        |         |
|                 | PLUS   |             |        |             |         |
+-----------------+--------+-------------+--------+-------------+---------+
 
 
 
+----------------------+--------+-------------+--------+-------------+-----------------+
| Guarantor Name       | Accoun | Relation to | Date   | Phone       | Billing Address |
|                      | t Type |  Patient    | of     |             |                 |
|                      |        |             | Birth  |             |                 |
+----------------------+--------+-------------+--------+-------------+-----------------+
| CHERIE JO | Person | Self        | / |   Home:     |   [address] [place] [address]    |
|                      | al/Fam |             | 1950   | [phone] | [place]    |
|                      | melina    |             |        | [address]        | [address]      |
+----------------------+--------+-------------+--------+-------------+-----------------+

## 2019-04-03 NOTE — XMS
Encounter Summary
  Created on: 2019
 
 Cherie Villalobos
 External Reference #: [id number]
 : 48
 Sex: Female
 
 Demographics
 
 
+-----------------------+--------------------------+
| Address               | [address]               |
|                       | [place]  [address] |
+-----------------------+--------------------------+
| Home Phone            | [phone]          |
+-----------------------+--------------------------+
| Preferred Language    | Unknown                  |
+-----------------------+--------------------------+
| Marital Status        |                   |
+-----------------------+--------------------------+
| Sikhism Affiliation | 1013                     |
+-----------------------+--------------------------+
| Race                  | Unknown                  |
+-----------------------+--------------------------+
| Ethnic Group          | Unknown                  |
+-----------------------+--------------------------+
 
 
 Author
 
 
+--------------+-----------------------+
| Author       | [employer]ry [employer] |
+--------------+-----------------------+
| Organization | [facility] |
+--------------+-----------------------+
| Address      | Unknown               |
+--------------+-----------------------+
| Phone        | Unavailable           |
+--------------+-----------------------+
 
 
 
 Support
 
 
+---------------+--------------+---------------------+-----------------+
| Name          | Relationship | Address             | Phone           |
+---------------+--------------+---------------------+-----------------+
| Anoop Villalobos | ECON         | [place], | [phone] |
|               |              |  [place]  [address]     |                 |
+---------------+--------------+---------------------+-----------------+
| Jennifer Dickson | ECON         | [place]       | [phone] |
|               |              | [address]               |                 |
+---------------+--------------+---------------------+-----------------+
 
 
 
 
 Care Team Providers
 
 
+-----------------------+------+-----------------+
| Care Team Member Name | Role | Phone           |
+-----------------------+------+-----------------+
| Ivy Couch DO      | PCP  | [phone] |
+-----------------------+------+-----------------+
 
 
 
 Encounter Details
 
 
+--------+-------------+----------------------+---------------------+-------------+
| Date   | Type        | Department           | Care Team           | Description |
+--------+-------------+----------------------+---------------------+-------------+
| / | Orders Only |   Dominic Burnham    |   Jessica Marley,  |             |
| 2019   |             | [facility]  | RDMS                |             |
|        |             | Ultrasound  [address]      |                     |             |
|        |             | [address]           |                     |             |
|        |             | [place] [address]   |                     |             |
|        |             | [phone]         |                     |             |
+--------+-------------+----------------------+---------------------+-------------+
 
 
 
 Social History
 
 
+---------------+------------+-----------+--------+------------------+
| Tobacco Use   | Types      | Packs/Day | Years  | Date             |
|               |            |           | Used   |                  |
+---------------+------------+-----------+--------+------------------+
| Former Smoker | Cigarettes | 1         | 30     | Quit: 2004 |
+---------------+------------+-----------+--------+------------------+
 
 
 
+---------------------+---+---+---+
| Smokeless Tobacco:  |   |   |   |
| Never Used          |   |   |   |
+---------------------+---+---+---+
 
 
 
+------------------------------+
| Comments: quite smoking 2004 |
+------------------------------+
 
 
 
+-------------+-----------+---------+----------+
| Alcohol Use | Drinks/We | oz/Week | Comments |
|             | ek        |         |          |
+-------------+-----------+---------+----------+
| No          |           |         |          |
+-------------+-----------+---------+----------+
 
 
 
 
+------------------+---------------+
| Sex Assigned at  | Date Recorded |
| Birth            |               |
+------------------+---------------+
| Not on file      |               |
+------------------+---------------+
 as of this encounter
 
 Plan of Treatment
 
 
+--------+---------+-----------+----------------------+-------------+
| Date   | Type    | Specialty | Care Team            | Description |
+--------+---------+-----------+----------------------+-------------+
| 04/10/ | Office  | Podiatry  |   Srinivasan Jordan,  |             |
| 2019   | Visit   |           | DPM  [address], |             |
|        |         |           |  [address]  [place],  |             |
|        |         |           | [place] [address]             |             |
|        |         |           | [phone]         |             |
|        |         |           | [phone] (Fax)   |             |
+--------+---------+-----------+----------------------+-------------+
 as of this encounter
 
 Visit Diagnoses
 Not on filein this encounter

## 2019-04-03 NOTE — XMS
Encounter Summary
  Created on: 2019
 
 Cherie Villalobos
 External Reference #: [id number]
 : 48
 Sex: Female
 
 Demographics
 
 
+-----------------------+--------------------------+
| Address               | [address]               |
|                       | [place]  [address] |
+-----------------------+--------------------------+
| Home Phone            | [phone]          |
+-----------------------+--------------------------+
| Preferred Language    | Unknown                  |
+-----------------------+--------------------------+
| Marital Status        |                   |
+-----------------------+--------------------------+
| Druze Affiliation | 1013                     |
+-----------------------+--------------------------+
| Race                  | Unknown                  |
+-----------------------+--------------------------+
| Ethnic Group          | Unknown                  |
+-----------------------+--------------------------+
 
 
 Author
 
 
+--------------+-----------------------+
| Author       | [employer] [employer] |
+--------------+-----------------------+
| Organization | [facility] |
+--------------+-----------------------+
| Address      | Unknown               |
+--------------+-----------------------+
| Phone        | Unavailable           |
+--------------+-----------------------+
 
 
 
 Support
 
 
+---------------+--------------+---------------------+-----------------+
| Name          | Relationship | Address             | Phone           |
+---------------+--------------+---------------------+-----------------+
| Anoop Villalobos | ECON         | [place], | [phone] |
|               |              |  [place]  [address]     |                 |
+---------------+--------------+---------------------+-----------------+
| Jennifer Dickson | ECON         | [place]       | [phone] |
|               |              | [phone]               |                 |
+---------------+--------------+---------------------+-----------------+
 
 
 
 
 Care Team Providers
 
 
+-----------------------+------+-----------------+
| Care Team Member Name | Role | Phone           |
+-----------------------+------+-----------------+
| Ivy Couch DO      | PCP  | [phone] |
+-----------------------+------+-----------------+
 
 
 
 Reason for Visit
 
 
+--------+--------------------------------+
| Reason | Comments                       |
+--------+--------------------------------+
| Other  | [facility] medical records request |
+--------+--------------------------------+
 
 
 
 Encounter Details
 
 
+--------+-------------+----------------------+---------------------+------------------+
| Date   | Type        | Department           | Care Team           | Description      |
+--------+-------------+----------------------+---------------------+------------------+
| / | Documentati |   [facility]      |   Peabody, Jessica  | Other Jean-Paul    |
| 2019   | on Only     | [address]        | [place]               | medical records  |
|        |             | [address]   |                     | request)         |
|        |             | [place] [address]   |                     |                  |
|        |             | [phone]         |                     |                  |
+--------+-------------+----------------------+---------------------+------------------+
 
 
 
 Social History
 
 
+---------------+------------+-----------+--------+------------------+
| Tobacco Use   | Types      | Packs/Day | Years  | Date             |
|               |            |           | Used   |                  |
+---------------+------------+-----------+--------+------------------+
| Former Smoker | Cigarettes | 1         | 30     | Quit: 2004 |
+---------------+------------+-----------+--------+------------------+
 
 
 
+---------------------+---+---+---+
| Smokeless Tobacco:  |   |   |   |
| Never Used          |   |   |   |
+---------------------+---+---+---+
 
 
 
+------------------------------+
| Comments: quite smoking 2004 |
+------------------------------+
 
 
 
 
+-------------+-----------+---------+----------+
| Alcohol Use | Drinks/We | oz/Week | Comments |
|             | ek        |         |          |
+-------------+-----------+---------+----------+
| No          |           |         |          |
+-------------+-----------+---------+----------+
 
 
 
+------------------+---------------+
| Sex Assigned at  | Date Recorded |
| Birth            |               |
+------------------+---------------+
| Not on file      |               |
+------------------+---------------+
 as of this encounter
 
 Progress Notes
 Peabody, Jessica M, RN - 2019  6:22 PM [facility] medical records request was denied. 
Fax sent forward to [facility]idel [facility]ia (F#[phone]). Fax confirmation received. in this en
counter
 
 Plan of Treatment
 
 
+--------+---------+-----------+----------------------+-------------+
| Date   | Type    | Specialty | Care Team            | Description |
+--------+---------+-----------+----------------------+-------------+
| 04/10/ | Office  | Podiatry  |   Srinivasan Jordan,  |             |
|    | Visit   |           | DPM  [address], |             |
|        |         |           |  [address]  [place],  |             |
|        |         |           | [place] [address]             |             |
|        |         |           | [phone]         |             |
|        |         |           | [phone] (Fax)   |             |
+--------+---------+-----------+----------------------+-------------+
 as of this encounter
 
 Visit Diagnoses
 Not on filein this encounter

## 2019-04-03 NOTE — XMS
Encounter Summary
  Created on: 2019
 
 Cherie Villalobos
 External Reference #: [id number]
 : 48
 Sex: Female
 
 Demographics
 
 
+-----------------------+--------------------------+
| Address               | [address]               |
|                       | [place]  [address] |
+-----------------------+--------------------------+
| Home Phone            | [phone]          |
+-----------------------+--------------------------+
| Preferred Language    | Unknown                  |
+-----------------------+--------------------------+
| Marital Status        |                   |
+-----------------------+--------------------------+
| Restorationism Affiliation | 1013                     |
+-----------------------+--------------------------+
| Race                  | Unknown                  |
+-----------------------+--------------------------+
| Ethnic Group          | Unknown                  |
+-----------------------+--------------------------+
 
 
 Author
 
 
+--------------+-----------------------+
| Author       | Sherry Genability |
+--------------+-----------------------+
| Organization | [facility] |
+--------------+-----------------------+
| Address      | Unknown               |
+--------------+-----------------------+
| Phone        | Unavailable           |
+--------------+-----------------------+
 
 
 
 Support
 
 
+---------------+--------------+---------------------+-----------------+
| Name          | Relationship | Address             | Phone           |
+---------------+--------------+---------------------+-----------------+
| Anoop Villalobos | ECON         | [place], | [phone] |
|               |              |  [place]  [address]     |                 |
+---------------+--------------+---------------------+-----------------+
| Jennifer Dickson | ECON         | [place]       | [phone] |
|               |              | [phone]               |                 |
+---------------+--------------+---------------------+-----------------+
 
 
 
 
 Care Team Providers
 
 
+-----------------------+------+-----------------+
| Care Team Member Name | Role | Phone           |
+-----------------------+------+-----------------+
| Ivy Couch DO      | PCP  | [phone] |
+-----------------------+------+-----------------+
 
 
 
 Reason for Visit
 
 
+--------+------------------------------------------------------+
| Reason | Comments                                             |
+--------+------------------------------------------------------+
| [facility]  | [facility] Note |
+--------+------------------------------------------------------+
 
 
 
 Encounter Details
 
 
+--------+-------------+----------------------+----------------------+----------------------
+
| Date   | Type        | Department           | Care Team            | Description          
|
+--------+-------------+----------------------+----------------------+----------------------
+
| / | Documentati |   [facility]      |   João Asher MD  | Other (December      
|
| 2019   | on Only     | [place]  [address]        |  [address]   | [facility] 
|
|        |             | [address]   | [address]  [place], [place]    |  Dialysis Rounding   
|
|        |             | [place] [address]   | [id number] [phone]  | Note)                
|
|        |             | [phone]         |  [phone] (Fax)  |                      
|
+--------+-------------+----------------------+----------------------+----------------------
+
 
 
 
 Social History
 
 
+---------------+------------+-----------+--------+------------------+
| Tobacco Use   | Types      | Packs/Day | Years  | Date             |
|               |            |           | Used   |                  |
+---------------+------------+-----------+--------+------------------+
| Former Smoker | Cigarettes | 1         | 30     | Quit: 2004 |
+---------------+------------+-----------+--------+------------------+
 
 
 
+---------------------+---+---+---+
 
| Smokeless Tobacco:  |   |   |   |
| Never Used          |   |   |   |
+---------------------+---+---+---+
 
 
 
+------------------------------+
| Comments: quite smoking  |
+------------------------------+
 
 
 
+-------------+-----------+---------+----------+
| Alcohol Use | Drinks/We | oz/Week | Comments |
|             | ek        |         |          |
+-------------+-----------+---------+----------+
| No          |           |         |          |
+-------------+-----------+---------+----------+
 
 
 
+------------------+---------------+
| Sex Assigned at  | Date Recorded |
| Birth            |               |
+------------------+---------------+
| Not on file      |               |
+------------------+---------------+
 as of this encounter
 
 Plan of Treatment
 
 
+--------+---------+-----------+----------------------+-------------+
| Date   | Type    | Specialty | Care Team            | Description |
+--------+---------+-----------+----------------------+-------------+
| 04/10/ | Office  | Podiatry  |   Srinivasan Jordan,  |             |
|    | Visit   |           | DPM  [address], |             |
|        |         |           |  [address]  [place],  |             |
|        |         |           | [place] [address]             |             |
|        |         |           | [phone]         |             |
|        |         |           | [phone] (Fax)   |             |
+--------+---------+-----------+----------------------+-------------+
 as of this encounter
 
 Visit Diagnoses
 Not on filein this encounter

## 2019-04-03 NOTE — XMS
Encounter Summary
  Created on: 2019
 
 Cherie Villalobos
 External Reference #: [id number]
 : 48
 Sex: Female
 
 Demographics
 
 
+-----------------------+--------------------------+
| Address               | [address]               |
|                       | [place]  [address] |
+-----------------------+--------------------------+
| Home Phone            | [phone]          |
+-----------------------+--------------------------+
| Preferred Language    | Unknown                  |
+-----------------------+--------------------------+
| Marital Status        |                   |
+-----------------------+--------------------------+
| Islam Affiliation | 1013                     |
+-----------------------+--------------------------+
| Race                  | Unknown                  |
+-----------------------+--------------------------+
| Ethnic Group          | Unknown                  |
+-----------------------+--------------------------+
 
 
 Author
 
 
+--------------+--------------------------------------------+
| Author       | [facility]  |
|              | [facility] [place]                                |
+--------------+--------------------------------------------+
| Organization | [facility]  |
|              | [facility] [place]                                |
+--------------+--------------------------------------------+
| Address      | Unknown                                    |
+--------------+--------------------------------------------+
| Phone        | Unavailable                                |
+--------------+--------------------------------------------+
 
 
 
 Support
 
 
+---------------+--------------+---------------------+-----------------+
| Name          | Relationship | Address             | Phone           |
+---------------+--------------+---------------------+-----------------+
| Anoop Villalobos | ECON         | [address], | [phone] |
|               |              |  [place]  [address]     |                 |
+---------------+--------------+---------------------+-----------------+
| Jennifer Dickson | ECON         | [place], [place]       | [phone] |
|               |              | [address]               |                 |
+---------------+--------------+---------------------+-----------------+
 
 
 
 
 Care Team Providers
 
 
+--------------------------+------+-----------------+
| Care Team Member Name    | Role | Phone           |
+--------------------------+------+-----------------+
| Araseli Montelongo Activity  | PCP  | [phone] |
| Professional             |      |                 |
+--------------------------+------+-----------------+
 
 
 
 Reason for Visit
 
 
+--------+------------------------------------+
| Reason | Comments                           |
+--------+------------------------------------+
| Other  | medication changed due to ER visit |
+--------+------------------------------------+
 
 
 
 Encounter Details
 
 
+--------+-----------+----------------------+----------------------+--------------------+
| Date   | Type      | Department           | Care Team            | Description        |
+--------+-----------+----------------------+----------------------+--------------------+
| / | Telephone |   [facility]          |   Johnie John, | Other (medication  |
| 2019   |           | CARDIOLOGY  [address]    |  MD  [address] | changed due to ER  |
|        |           | [address]  [place], |  [address]  [place],   | visit)             |
|        |           |  [place] [address]       | [place] [address]             |                    |
|        |           | [phone]         | [phone]         |                    |
|        |           |                      | [phone] (Fax)   |                    |
+--------+-----------+----------------------+----------------------+--------------------+
 
 
 
 Social History
 
 
+---------------+------------+-----------+--------+------------------+
| Tobacco Use   | Types      | Packs/Day | Years  | Date             |
|               |            |           | Used   |                  |
+---------------+------------+-----------+--------+------------------+
| Former Smoker | Cigarettes | 1         | 30     | Quit: 2004 |
+---------------+------------+-----------+--------+------------------+
 
 
 
+---------------------+---+---+---+
| Smokeless Tobacco:  |   |   |   |
| Never Used          |   |   |   |
+---------------------+---+---+---+
 
 
 
 
+-------------+-----------+---------+----------+
| Alcohol Use | Drinks/We | oz/Week | Comments |
|             | ek        |         |          |
+-------------+-----------+---------+----------+
| No          |           |         |          |
+-------------+-----------+---------+----------+
 
 
 
+------------------+---------------+
| Sex Assigned at  | Date Recorded |
| Birth            |               |
+------------------+---------------+
| Not on file      |               |
+------------------+---------------+
 as of this encounter
 
 Functional Status
 
 
+---------------------------------------------+----------+--------------------+
| Functional Status                           | Response | Date of Assessment |
+---------------------------------------------+----------+--------------------+
| Are you deaf or do you have serious         | No       | 2018         |
| difficulty hearing?                         |          |                    |
+---------------------------------------------+----------+--------------------+
| Are you blind or do you have serious        | No       | 2018         |
| difficulty seeing, even when wearing        |          |                    |
| glasses?                                    |          |                    |
+---------------------------------------------+----------+--------------------+
| Do you have serious difficulty walking or   | No       | 2018         |
| climbing stairs? (5 years old or older)     |          |                    |
+---------------------------------------------+----------+--------------------+
| Do you have difficulty dressing or bathing? | No       | 2018         |
|  (5 years old or older)                     |          |                    |
+---------------------------------------------+----------+--------------------+
| Because of a physical, mental, or emotional | No       | 2018         |
|  condition, do you have difficulty doing    |          |                    |
| errands alone such as visiting a doctor's   |          |                    |
| office or shopping? [15 years old or        |          |                    |
| older)]                                     |          |                    |
+---------------------------------------------+----------+--------------------+
 
 
 
+---------------------------------------------+----------+--------------------+
| Cognitive Status                            | Response | Date of Assessment |
+---------------------------------------------+----------+--------------------+
| Because of a physical, mental, or emotional | No       | 2018         |
|  condition, do you have serious difficulty  |          |                    |
| concentrating, remembering, or making       |          |                    |
| decisions? (5 years old or older)           |          |                    |
+---------------------------------------------+----------+--------------------+
 as of this encounter
 
 Plan of Treatment
 
 
+--------+---------+------------+----------------------+-------------+
 
| Date   | Type    | Specialty  | Care Team            | Description |
+--------+---------+------------+----------------------+-------------+
| / | Office  | Cardiology |   Johnie John, |             |
| 2019   | Visit   |            |  MD  [address] |             |
|        |         |            |  [place],   |             |
|        |         |            | [place] [address]             |             |
|        |         |            | [phone]         |             |
|        |         |            | [phone] (Fax)   |             |
+--------+---------+------------+----------------------+-------------+
 as of this encounter
 
 Visit Diagnoses
 Not on filein this encounter

## 2019-04-03 NOTE — XMS
Encounter Summary
  Created on: 2019
 
 Cherie Villalobos
 External Reference #: [id number]
 : 48
 Sex: Female
 
 Demographics
 
 
+-----------------------+--------------------------+
| Address               | [address]               |
|                       | [place]  [address] |
+-----------------------+--------------------------+
| Home Phone            | [phone]          |
+-----------------------+--------------------------+
| Preferred Language    | Unknown                  |
+-----------------------+--------------------------+
| Marital Status        |                   |
+-----------------------+--------------------------+
| Anglican Affiliation | 1013                     |
+-----------------------+--------------------------+
| Race                  | Unknown                  |
+-----------------------+--------------------------+
| Ethnic Group          | Unknown                  |
+-----------------------+--------------------------+
 
 
 Author
 
 
+--------------+-----------------------+
| Author       | Sherry Todacell |
+--------------+-----------------------+
| Organization | [facility] |
+--------------+-----------------------+
| Address      | Unknown               |
+--------------+-----------------------+
| Phone        | Unavailable           |
+--------------+-----------------------+
 
 
 
 Support
 
 
+---------------+--------------+---------------------+-----------------+
| Name          | Relationship | Address             | Phone           |
+---------------+--------------+---------------------+-----------------+
| Anoop Villalobos | ECON         | [place], | [phone] |
|               |              |  [place]  [address]     |                 |
+---------------+--------------+---------------------+-----------------+
| Jennifer Dickson | ECON         | [place]       | [phone] |
|               |              | [phone]               |                 |
+---------------+--------------+---------------------+-----------------+
 
 
 
 
 Care Team Providers
 
 
+-----------------------+------+-----------------+
| Care Team Member Name | Role | Phone           |
+-----------------------+------+-----------------+
| Ivy Couch DO      | PCP  | [phone] |
+-----------------------+------+-----------------+
 
 
 
 Reason for Visit
 
 
+-----------+---------------------------+
| Reason    | Comments                  |
+-----------+---------------------------+
| Follow-up | 6 wk f/u possible pd cath |
+-----------+---------------------------+
 
 
 
 Encounter Details
 
 
+--------+---------+----------------------+----------------------+----------------------+
| Date   | Type    | Department           | Care Team            | Description          |
+--------+---------+----------------------+----------------------+----------------------+
| / | Office  |   [facility]      |   Kirt Mclaughlin MD     | PAD (peripheral      |
| 2019   | Visit   | Vascular Surgery     | [address]  | artery disease)      |
|        |         | [address]  [address] |  [place] [address]  | ([facility]) (Primary Dx);  |
|        |         |  [place]  [place]     |  [phone]        | ESRD (end stage      |
|        |         | [address]           | [phone] (Fax)   | renal disease) ([facility]) |
|        |         | [phone]         |                      |                      |
+--------+---------+----------------------+----------------------+----------------------+
 
 
 
 Social History
 
 
+---------------+------------+-----------+--------+------------------+
| Tobacco Use   | Types      | Packs/Day | Years  | Date             |
|               |            |           | Used   |                  |
+---------------+------------+-----------+--------+------------------+
| Former Smoker | Cigarettes | 1         | 30     | Quit: 2004 |
+---------------+------------+-----------+--------+------------------+
 
 
 
+---------------------+---+---+---+
| Smokeless Tobacco:  |   |   |   |
| Never Used          |   |   |   |
+---------------------+---+---+---+
 
 
 
+------------------------------+
| Comments: quite smoking  |
 
+------------------------------+
 
 
 
+-------------+-----------+---------+----------+
| Alcohol Use | Drinks/We | oz/Week | Comments |
|             | ek        |         |          |
+-------------+-----------+---------+----------+
| No          |           |         |          |
+-------------+-----------+---------+----------+
 
 
 
+------------------+---------------+
| Sex Assigned at  | Date Recorded |
| Birth            |               |
+------------------+---------------+
| Not on file      |               |
+------------------+---------------+
 as of this encounter
 
 Last Filed Vital Signs
 
 
+-------------------+---------+-------------------------+
| Vital Sign        | Reading | Time Taken              |
+-------------------+---------+-------------------------+
| Blood Pressure    | 155/77  | 2019  1:14 PM PST |
+-------------------+---------+-------------------------+
| Pulse             | 89      | 2019  1:14 PM PST |
+-------------------+---------+-------------------------+
| Temperature       | -       | -                       |
+-------------------+---------+-------------------------+
| Respiratory Rate  | -       | -                       |
+-------------------+---------+-------------------------+
| Oxygen Saturation | 100%    | 2019  1:14 PM PST |
+-------------------+---------+-------------------------+
| Inhaled Oxygen    | -       | -                       |
| Concentration     |         |                         |
+-------------------+---------+-------------------------+
| Weight            | -       | -                       |
+-------------------+---------+-------------------------+
| Height            | -       | -                       |
+-------------------+---------+-------------------------+
| Body Mass Index   | -       | -                       |
+-------------------+---------+-------------------------+
 in this encounter
 
 Progress Notes
 Kirt Mclaughlin MD - 2019  2:45 PM PSTFormatting of this note may be different from the o
aleksandar.
 Ms. Villalobos is a pleasant 68 y.o. female with PMH significant for acute MI, anemia, Afib, KD
, COPD, CAD, diabetes, ESRD on HD, hyperlipidemia, hypertension who is referred to me for RL
E ulcerations and PD catheter consideration. Patient has ulcerations on her right second toe
 and is seen by both Infectious Disease and Podiatry for this. Patient had recent left foot 
amputation via Dr. Jordan in the end of December. Patient describes that she has ulceration
s on her hands that she had been told has been vascular in etiology. Patient is followed by 
Dr. Elliott, Infectious Disease, and is receiving IV antibiotics on days she has dialysis. Pratima
ent is also considering peritoneal dialysis catheter but still hesitant about this. Patient 
is seen by Dr. Matias, podiatry. 
 
 
 US Bilateral Lower Extremity Arterial
 
 Impression 
 1. Right leg shows biphasic inflow with a greater than 50% stenosis at 
 the origin of the superficial femoral artery (137-309). Biphasic 
 outflow. 
 2. Two vessel runoff to the right foot. 
 Signed by: Eugenio Hoffman 
 Sign Date/Time: 2019 3:11 PM 
 
 Past Medical History 
 Diagnosis Date 
   Acute MI ([facility])  
  with stenting x 1 
   Anemia associated with chronic renal failure 2016 
   Atrial fibrillation (HCC)  
   Chronic kidney disease  
   Congestive heart failure (HCC)  
   COPD (chronic obstructive pulmonary disease) (HCC)  
   COPD (chronic obstructive pulmonary disease) ([facility]) 2018 
   Coronary artery disease  
  stent placements: 3 total 
   Depression  
   Diabetes other 
  abstracted 
   Diabetes mellitus, type 2 (HCC)  
   ESRD on peritoneal dialysis (HCC)  
   GERD (gastroesophageal reflux disease)  
   Hemodialysis patient ([facility]) 10/2016 
  Stopped peritoneal and restarted hemodialysis 
   Hemorrhage of gastrointestinal tract, unspecified 2017 
  low GI, rectal bleeding 
   High blood pressure other 
  abstracted 
   High cholesterol other 
  abstracted 
   Hyperlipidemia  
   Hypertension  
   Hyponatremia 2017 
   Myocardial infarction (HCC) 2017 
   Other chronic pain  
  feet,  
   Pain of left hip joint 2016 
  due to hip fracture and replacement 
   Partial small bowel obstruction ([facility]) 2017 
  resolved 
   Renal failure  
  Stage 5.   Fistula in the JAN - not on dialysis yet. 
   Unspecified visual disturbance  
  glasses 
 
 Past Surgical History 
 Procedure Laterality Date 
   AV FISTULA PLACEMENT   
  left upper arm AV fistula - failed 
   AV FISTULA REPAIR N/A 10/25/2016 
  Procedure: AV FISTULA - GRAFT REPAIR/REVISION;  Surgeon: Kirt Mclaughlin MD;  Location: [facility]
IN OR;  Service: Vascular;  Laterality: N/A;  Superficialization and revision of left arm fi
stula 
 
   CARDIAC CATHETERIZATION  2017 
   CARPAL TUNNEL RELEASE Bilateral other 
  abstracted 
   CATARACT EXTRACTION Bilateral 2007 
   CATHETER REMOVAL N/A 2016 
  Procedure: DIALYSIS CATHETER - REMOVAL;  Surgeon: Kirt Mclaughlin MD;  Location: [facility] OR; 
 Service: Vascular;  Laterality: N/A;  PD cath removal 
   CHOLECYSTECTOMY  other 
  abstracted 
   COLONOSCOPY   
   CORONARY ARTERY BYPASS GRAFT   
   CORONARY STENT PLACEMENT  2017 
  Drug Elutin stents to OM, 1 stent to prox. LAD 
   EYE SURGERY   
   FOOT AMPUTATION Left 2018 
  Procedure: FOREFOOT - AMPUTATION;  Surgeon: Srinivasan Jordan DPM;  Location: [facility] MAIN OR;
  Service: Podiatry;  Laterality: Left; 
   HARDWARE PRESENT   
  stent placements x 3, Left hip replacement, vascular stents 2 in left leg 
   HIP ARTHROPLASTY Left 2016 
  Procedure: HIP - ARTHROPLASTY(ALLISON);  Surgeon: Uriel Roa MD;  Location: [facility] MAIN 
OR;  Service: Orthopedics;  Laterality: Left; 
   HYSTERECTOMY  1998 
  complete 
   PACEMAKER INSERTION   
  [employer] pacemaker/defib 
   PERITONEAL CATHETER INSERTION  8/15/2013 
   PERITONEAL CATHETER INSERTION N/A 2016 
  Procedure: LAPAROSCOPIC - PERITONEAL DIALYSIS CATH INSERTION;  Surgeon: Kirt Mclaughlin MD;  Lo
cation: [facility] MAIN OR;  Service: Vascular;  Laterality: N/A;  Removal of old catheter 
   SHOULDER SURGERY Right  
  frozen shoulder 
   UNLISTED PROCEDURE ARTHROSCOPY   
  total Left hip 
   vascular stents Left 2017 
  leg (2 stents) 
   VASCULAR SURGERY   
 
 Social History 
 Substance Use Topics 
   Smoking status: Former Smoker 
   Packs/day: 1.00 
   Years: 30.00 
   Types: Cigarettes 
   Quit date: 2004 
   Smokeless tobacco: Never Used 
    Comment: quite smoking  
   Alcohol use No 
 
 Family History 
 Problem Relation Age of Onset 
   High cholesterol Father  
   Hypertension Father  
   Diabetes Paternal Grandmother  
   Malig hypertherm Neg Hx  
   Kidney disease Neg Hx  
 
 Current Outpatient Prescriptions on File Prior to Visit 
 Medication Sig Dispense Refill 
   albuterol (PROVENTIL HFA;VENTOLIN HFA) 108 (90 Base) MCG/ACT inhaler Inhale 2 puffs int
 
o the lungs every 4 (four) hours as needed for Wheezing.   
   albuterol (VENTOLIN HFA) 108 (90 Base) MCG/ACT inhaler Ventolin HFA 90 mcg/actuation ae
rosol inhaler
  Inhale 2 puffs every 4 hours by inhalation route as needed.   
   apixaban (ELIQUIS) 2.5 MG tablet Take 1 tablet by mouth 2 (two) times daily. 60 tablet 
0 
   atorvastatin (LIPITOR) 40 MG tablet Take 1 tablet by mouth nightly. 30 tablet 0 
   bisacodyl (DULCOLAX) 10 MG suppository Place 10 mg rectally.   
   calcium acetate (PHOSLO) 667 MG capsule 667 mg 3 (three) times daily with meals.   
   carvedilol (COREG) 12.5 MG tablet Take 1 tablet by mouth 2 (two) times daily with meals
. 60 tablet 0 
   cefTAZidime (FORTAZ) 2 g injection Inject 2 g into the muscle After Hemodialysis (see a
dmin instructions) for 7 days. 1 each  
   Cholecalciferol (VITAMIN D3) 5000 UNITS CAPS Take 5,000 capsules by mouth every other d
ay.   
   clopidogrel (PLAVIX) 75 MG tablet Take 1 tablet by mouth daily. 30 tablet 11 
   esomeprazole (NEXIUM) 20 MG capsule NEXIUM(ESOMEPRAZOLE MAGNESIUM) 20 MG CAPSULE.DR
 Dose: 1 CAP   
   HYDROcodone-acetaminophen (NORCO) 5-325 MG per tablet Take 1 tablet by mouth every 4 (f
our) hours as needed. 30 tablet 0 
   insulin aspart (NOVOLOG) 100 UNIT/ML injection Inject  into the skin 3 (three) times da
melina before meals.   
   insulin degludec (TRESIBA) 100 UNIT/ML injection Tresiba Flextouch U-100(INSULIN DEGLUD
EC) 100 UNIT/1 ML INSULN.PEN
 Dose: 20 UNIT at night   
   isosorbide mononitrate (IMDUR) 60 MG 24 hr tablet Take 1 tablet by mouth daily. 30 tabl
et 1 
   loperamide (IMODIUM) 2 MG capsule 2 mg as needed.   
   LORazepam (ATIVAN) 1 MG tablet Take 1 tablet by mouth every 8 (eight) hours as needed f
or Anxiety. 30 tablet 0 
   Magnesium Cl-Calcium Carbonate (SLOW-MAG) 71.5-119 MG TBEC Take 1 tablet by mouth every
 other day.   
   nitroGLYCERIN (NITROSTAT) 0.4 MG SL tablet Place 1 tablet under the tongue every 5 (fiv
e) minutes as needed for Chest pain. 30 tablet 0 
   nortriptyline (PAMELOR) 25 MG capsule Take 25 mg by mouth 3 (three) times daily. Takes 
25 mg in am , and 75 mg at night   
   ondansetron (ZOFRAN-ODT) 4 MG disintegrating tablet Take 4 mg by mouth as needed.   
   oxybutynin (DITROPAN) 5 MG tablet Take 2.5 mg by mouth 2 (two) times daily.   
   prochlorperazine 5 MG tablet TAKE ONE TABLET BY MOUTH TWICE DAILY AS NEEDED FOR NAUSEA 
60 tablet 11 
   ranitidine (ZANTAC) 150 MG tablet ranitidine 150 mg tablet
  Take 1 tablet twice a day by oral route.   
   rOPINIRole (REQUIP) 0.25 MG tablet Take 0.75 mg by mouth nightly.   
   vancomycin (VANCOCIN) IVPB Inject 750 mg into the vein After Hemodialysis (see admin in
structions) for 7 days. Dilute in appropriate volume of IV fluid and give by slow IV infusio
n.  0 
   Vortioxetine HBr 10 MG TABS 10 mg nightly.   
 
 No current facility-administered medications on file prior to visit.  
 
 Allergies 
 Allergen Reactions 
   Quinapril Hcl Anaphylaxis 
   Digoxin And Related Other (See Comments) 
   toxic 
   Metaxalone Other (See Comments) 
   Morphine Mental Changes 
   Make her crazy/ "funny feeling in my head"
 Has used hydromorphone in-house 
   Pantoprazole Sodium Nausea and Vomiting 
 
   Penicillins Rash 
   Quinapril Hcl Edema 
   Facial Edema 
   Sudafed [Pseudoephedrine Hcl] Rash 
 
 Comprehensive ROS performed and pertinent items described in the HPI. 
 
 Vitals:reviewed
 CONSTITUTIONAL:  Conversant, well developed, NAD
 EYES: Anicteric sclerae, no lid drag, no proptosis
 RESP: Normal effort, regular, even, unlabored rate
 CV: No peripheral edema, rate regular
 SKIN: Pink, warm, dry without rash/lesion
 MS: ROM not limited, no digital cyanosis, normal gait
 NEURO: Cranial nerves II-XII grossly intact, A&O times 3
 PSYCH: appropriate affect, speech and tone, judgement and insight intact
 Vascular: strong biphasic signals in right foot.  
 
 Discussed ultrasound results from 19 with the patient, which show mildly decreased blo
od flow in her right leg. The patient's nonhealing right foot ulcerations are likely due to 
infection and not due to vascular issues. Discussed with the patient that the symptoms in he
r hands are due to vascular access steal syndrome. Recommended against PD catheter due to kurtis rodriguez's past issues concerning adhesions and recommended that the patient stay with HD as lo
ng as she is still tolerating it well. Discussed with the patient that the ulceration in her
 right foot still shows signs of infection and patient was advised to monitor it closely to 
prevent amputation.  If there is still poor healing after infection control, we may consider
 repeating arteriogram at that time. All questions and concerns addressed. Patient will foll
ow up in 1 month. Patient understands and is agreeable. 
 
 Kirt Mclaughlin MD
 
 Attending Note: Documentation assistance provided by Tito Clark (David). Information tammy
rded by the [email or web address] has been reviewed and validated by me. I agree with its contents.
 
 Signed by: David Combs 19, 1:38 PM
 in this encounter
 
 Plan of Treatment
 
 
+--------+---------+-----------+----------------------+-------------+
| Date   | Type    | Specialty | Care Team            | Description |
+--------+---------+-----------+----------------------+-------------+
| 04/10/ | Office  | Podiatry  |   Srinivasan Jordan,  |             |
|    | Visit   |           | DPM  [address] [facility] |             |
|        |         |           |  [address]  [place],  |             |
|        |         |           | [place] [address]             |             |
|        |         |           | [phone]         |             |
|        |         |           | [phone] (Fax)   |             |
+--------+---------+-----------+----------------------+-------------+
 as of this encounter
 
 Visit Diagnoses
 
 
+----------------------------------------------------+
| Diagnosis                                          |
+----------------------------------------------------+
|   PAD (peripheral artery disease) (HCC) - Primary  |
+----------------------------------------------------+
 
|   Unspecified disorders of arteries and arterioles |
+----------------------------------------------------+
|   ESRD (end stage renal disease) (HCC)             |
+----------------------------------------------------+
|   End stage renal disease                          |
+----------------------------------------------------+

## 2019-04-03 NOTE — XMS
Encounter Summary
  Created on: 2019
 
 Cherie Villalobos
 External Reference #: [id number]
 : 48
 Sex: Female
 
 Demographics
 
 
+-----------------------+--------------------------+
| Address               | [address]               |
|                       | [place]  [address] |
+-----------------------+--------------------------+
| Home Phone            | [phone]          |
+-----------------------+--------------------------+
| Preferred Language    | Unknown                  |
+-----------------------+--------------------------+
| Marital Status        |                   |
+-----------------------+--------------------------+
| [employer]an Affiliation | 1013                     |
+-----------------------+--------------------------+
| Race                  | Unknown                  |
+-----------------------+--------------------------+
| Ethnic Group          | Unknown                  |
+-----------------------+--------------------------+
 
 
 Author
 
 
+--------------+--------------------------------------------+
| Author       | formerly [facility]  |
|              | and [place]                                |
+--------------+--------------------------------------------+
| Organization | formerly [facility]  |
|              | and [place]                                |
+--------------+--------------------------------------------+
| Address      | Unknown                                    |
+--------------+--------------------------------------------+
| Phone        | Unavailable                                |
+--------------+--------------------------------------------+
 
 
 
 Support
 
 
+---------------+--------------+---------------------+-----------------+
| Name          | Relationship | Address             | Phone           |
+---------------+--------------+---------------------+-----------------+
| Anoop Villalobos | ECON         | [address], | [phone] |
|               |              |  [place]  [address]     |                 |
+---------------+--------------+---------------------+-----------------+
| Jennifer Dickson | ECON         | [place], [place]       | [phone] |
|               |              | [address]               |                 |
+---------------+--------------+---------------------+-----------------+
 
 
 
 
 Care Team Providers
 
 
+--------------------------+------+-----------------+
| Care Team Member Name    | Role | Phone           |
+--------------------------+------+-----------------+
| Araseli Montelongo Activity  | PCP  | [phone] |
| Professional             |      |                 |
+--------------------------+------+-----------------+
 
 
 
 Reason for Visit
 
 
+--------+----------+
| Reason | Comments |
+--------+----------+
| Other  | angina   |
+--------+----------+
 
 
 
 Encounter Details
 
 
+--------+-----------+----------------------+----------------------+----------------+
| Date   | Type      | Department           | Care Team            | Description    |
+--------+-----------+----------------------+----------------------+----------------+
| / | Telephone |   [facility]          |   Johnie John, | Other (angina) |
| 2019   |           | CARDIOLOGY  [address]    |  MD  [address] |                |
|        |           | [address]  [place], |  [place]  [place],   |                |
|        |           |  [place] [address]       | [place] [address]             |                |
|        |           | [phone]         | [phone]         |                |
|        |           |                      | [phone] (Fax)   |                |
+--------+-----------+----------------------+----------------------+----------------+
 
 
 
 Social History
 
 
+---------------+------------+-----------+--------+------------------+
| Tobacco Use   | Types      | Packs/Day | Years  | Date             |
|               |            |           | Used   |                  |
+---------------+------------+-----------+--------+------------------+
| Former Smoker | Cigarettes | 1         | 30     | Quit: 2004 |
+---------------+------------+-----------+--------+------------------+
 
 
 
+---------------------+---+---+---+
| Smokeless Tobacco:  |   |   |   |
| Never Used          |   |   |   |
+---------------------+---+---+---+
 
 
 
 
+-------------+-----------+---------+----------+
| Alcohol Use | Drinks/We | oz/Week | Comments |
|             | ek        |         |          |
+-------------+-----------+---------+----------+
| No          |           |         |          |
+-------------+-----------+---------+----------+
 
 
 
+------------------+---------------+
| Sex Assigned at  | Date Recorded |
| Birth            |               |
+------------------+---------------+
| Not on file      |               |
+------------------+---------------+
 as of this encounter
 
 Functional Status
 
 
+---------------------------------------------+----------+--------------------+
| Functional Status                           | Response | Date of Assessment |
+---------------------------------------------+----------+--------------------+
| Are you deaf or do you have serious         | No       | 2018         |
| difficulty hearing?                         |          |                    |
+---------------------------------------------+----------+--------------------+
| Are you blind or do you have serious        | No       | 2018         |
| difficulty seeing, even when wearing        |          |                    |
| glasses?                                    |          |                    |
+---------------------------------------------+----------+--------------------+
| Do you have serious difficulty walking or   | No       | 2018         |
| climbing stairs? (5 years old or older)     |          |                    |
+---------------------------------------------+----------+--------------------+
| Do you have difficulty dressing or bathing? | No       | 2018         |
|  (5 years old or older)                     |          |                    |
+---------------------------------------------+----------+--------------------+
| Because of a physical, mental, or emotional | No       | 2018         |
|  condition, do you have difficulty doing    |          |                    |
| errands alone such as visiting a doctor's   |          |                    |
| office or shopping? [15 years old or        |          |                    |
| older)]                                     |          |                    |
+---------------------------------------------+----------+--------------------+
 
 
 
+---------------------------------------------+----------+--------------------+
| Cognitive Status                            | Response | Date of Assessment |
+---------------------------------------------+----------+--------------------+
| Because of a physical, mental, or emotional | No       | 2018         |
|  condition, do you have serious difficulty  |          |                    |
| concentrating, remembering, or making       |          |                    |
| decisions? (5 years old or older)           |          |                    |
+---------------------------------------------+----------+--------------------+
 as of this encounter
 
 Plan of Treatment
 
 
+--------+---------+------------+----------------------+-------------+
 
| Date   | Type    | Specialty  | Care Team            | Description |
+--------+---------+------------+----------------------+-------------+
| / | Office  | Cardiology |   Johnie John, |             |
|    | Visit   |            |  MD  [address] |             |
|        |         |            |  [place],   |             |
|        |         |            | [place] [address]             |             |
|        |         |            | [phone]         |             |
|        |         |            | [phone] (Fax)   |             |
+--------+---------+------------+----------------------+-------------+
 as of this encounter
 
 Visit Diagnoses
 Not on filein this encounter

## 2019-04-03 NOTE — XMS
Encounter Summary
  Created on: 2019
 
 Cherie Villalobos
 External Reference #: [id number]
 : 48
 Sex: Female
 
 Demographics
 
 
+-----------------------+--------------------------+
| Address               | [address]               |
|                       | [place]  [address] |
+-----------------------+--------------------------+
| Home Phone            | [phone]          |
+-----------------------+--------------------------+
| Preferred Language    | Unknown                  |
+-----------------------+--------------------------+
| Marital Status        |                   |
+-----------------------+--------------------------+
| [employer] Affiliation | 1013                     |
+-----------------------+--------------------------+
| Race                  | Unknown                  |
+-----------------------+--------------------------+
| Ethnic Group          | Unknown                  |
+-----------------------+--------------------------+
 
 
 Author
 
 
+--------------+-----------------------+
| Author       | [facility] [employer] |
+--------------+-----------------------+
| Organization | [facility] |
+--------------+-----------------------+
| Address      | Unknown               |
+--------------+-----------------------+
| Phone        | Unavailable           |
+--------------+-----------------------+
 
 
 
 Support
 
 
+---------------+--------------+---------------------+-----------------+
| Name          | Relationship | Address             | Phone           |
+---------------+--------------+---------------------+-----------------+
| Anoop Villalobos | ECON         | [place], | [phone] |
|               |              |  [place]  [address]     |                 |
+---------------+--------------+---------------------+-----------------+
| Jennifer Dickson | ECON         | [place]       | [phone] |
|               |              | [address]               |                 |
+---------------+--------------+---------------------+-----------------+
 
 
 
 
 Care Team Providers
 
 
+-----------------------+------+-----------------+
| Care Team Member Name | Role | Phone           |
+-----------------------+------+-----------------+
| Ivy Couch DO      | PCP  | [phone] |
+-----------------------+------+-----------------+
 
 
 
 Encounter Details
 
 
+--------+------------+----------------------+-----------+-------------+
| Date   | Type       | Department           | Care Team | Description |
+--------+------------+----------------------+-----------+-------------+
| / | Procedure  |   [facility]    |           |             |
| 2019   | Pass       | [facility] [address]   |           |             |
|        |            | [address] |           |             |
|        |            |   [address]     |           |             |
|        |            | [place] [address]   |           |             |
|        |            | [phone]         |           |             |
+--------+------------+----------------------+-----------+-------------+
 
 
 
 Social History
 
 
+---------------+------------+-----------+--------+------------------+
| Tobacco Use   | Types      | Packs/Day | Years  | Date             |
|               |            |           | Used   |                  |
+---------------+------------+-----------+--------+------------------+
| Former Smoker | Cigarettes | 1         | 30     | Quit: 2004 |
+---------------+------------+-----------+--------+------------------+
 
 
 
+---------------------+---+---+---+
| Smokeless Tobacco:  |   |   |   |
| Never Used          |   |   |   |
+---------------------+---+---+---+
 
 
 
+------------------------------+
| Comments: quite smoking  |
+------------------------------+
 
 
 
+-------------+-----------+---------+----------+
| Alcohol Use | Drinks/We | oz/Week | Comments |
|             | ek        |         |          |
+-------------+-----------+---------+----------+
| No          |           |         |          |
+-------------+-----------+---------+----------+
 
 
 
 
+------------------+---------------+
| Sex Assigned at  | Date Recorded |
| Birth            |               |
+------------------+---------------+
| Not on file      |               |
+------------------+---------------+
 as of this encounter
 
 Plan of Treatment
 
 
+--------+---------+-----------+----------------------+-------------+
| Date   | Type    | Specialty | Care Team            | Description |
+--------+---------+-----------+----------------------+-------------+
| 04/10/ | Office  | Podiatry  |   Srinivasan Jordan,  |             |
| 2019   | Visit   |           | DPM  [address], |             |
|        |         |           |  [place],  |             |
|        |         |           | [place] [address]             |             |
|        |         |           | [phone]         |             |
|        |         |           | [phone] (Fax)   |             |
+--------+---------+-----------+----------------------+-------------+
 as of this encounter
 
 Visit Diagnoses
 Not on filein this encounter

## 2019-04-03 NOTE — XMS
Encounter Summary
  Created on: 2019
 
 Cherie Villalobos
 External Reference #: [id number]
 : 48
 Sex: Female
 
 Demographics
 
 
+-----------------------+--------------------------+
| Address               | [address]               |
|                       | [place]  [address] |
+-----------------------+--------------------------+
| Home Phone            | [phone]          |
+-----------------------+--------------------------+
| Preferred Language    | Unknown                  |
+-----------------------+--------------------------+
| Marital Status        |                   |
+-----------------------+--------------------------+
| Mormon Affiliation | 1013                     |
+-----------------------+--------------------------+
| Race                  | Unknown                  |
+-----------------------+--------------------------+
| Ethnic Group          | Unknown                  |
+-----------------------+--------------------------+
 
 
 Author
 
 
+--------------+-----------------------+
| Author       | [facility] [employer] |
+--------------+-----------------------+
| Organization | [facility] |
+--------------+-----------------------+
| Address      | Unknown               |
+--------------+-----------------------+
| Phone        | Unavailable           |
+--------------+-----------------------+
 
 
 
 Support
 
 
+---------------+--------------+---------------------+-----------------+
| Name          | Relationship | Address             | Phone           |
+---------------+--------------+---------------------+-----------------+
| Anoop Villalobos | ECON         | [place], | [phone] |
|               |              |  [place]  [address]     |                 |
+---------------+--------------+---------------------+-----------------+
| Jennifer Dickson | ECON         | [place]       | [phone] |
|               |              | [address]               |                 |
+---------------+--------------+---------------------+-----------------+
 
 
 
 
 Care Team Providers
 
 
+-----------------------+------+-----------------+
| Care Team Member Name | Role | Phone           |
+-----------------------+------+-----------------+
| Ivy Couch DO      | PCP  | [phone] |
+-----------------------+------+-----------------+
 
 
 
 Reason for Visit
 
 
+--------+---------------------------------------------------+
| Reason | Comments                                          |
+--------+---------------------------------------------------+
| Other  | Diagnosis request sent to [facility] |
+--------+---------------------------------------------------+
 
 
 
 Encounter Details
 
 
+--------+-------------+----------------------+---------------+-------------------+
| Date   | Type        | Department           | Care Team     | Description       |
+--------+-------------+----------------------+---------------+-------------------+
| 01/15/ | Documentati |   [facility]      |   Rl,  | Other (Diagnosis  |
| 2019   | on Only     | [place]  [address]    | [address] [address]  | request sent to   |
|        |             | [address]    |               | [facility]        |
|        |             | [place] [address]  |               | confirmation)     |
|        |             |  [phone]        |               |                   |
+--------+-------------+----------------------+---------------+-------------------+
 
 
 
 Social History
 
 
+---------------+------------+-----------+--------+------------------+
| Tobacco Use   | Types      | Packs/Day | Years  | Date             |
|               |            |           | Used   |                  |
+---------------+------------+-----------+--------+------------------+
| Former Smoker | Cigarettes | 1         | 30     | Quit: 2004 |
+---------------+------------+-----------+--------+------------------+
 
 
 
+---------------------+---+---+---+
| Smokeless Tobacco:  |   |   |   |
| Never Used          |   |   |   |
+---------------------+---+---+---+
 
 
 
+------------------------------+
| Comments: quite smoking  |
+------------------------------+
 
 
 
 
+-------------+-----------+---------+----------+
| Alcohol Use | Drinks/We | oz/Week | Comments |
|             | ek        |         |          |
+-------------+-----------+---------+----------+
| No          |           |         |          |
+-------------+-----------+---------+----------+
 
 
 
+------------------+---------------+
| Sex Assigned at  | Date Recorded |
| Birth            |               |
+------------------+---------------+
| Not on file      |               |
+------------------+---------------+
 as of this encounter
 
 Plan of Treatment
 
 
+--------+---------+-----------+----------------------+-------------+
| Date   | Type    | Specialty | Care Team            | Description |
+--------+---------+-----------+----------------------+-------------+
| 04/10/ | Office  | Podiatry  |   Srinivasan Jordan,  |             |
|    | Visit   |           | DPM  [address], |             |
|        |         |           |  [address]  [place],  |             |
|        |         |           | [place] [address]             |             |
|        |         |           | [phone]         |             |
|        |         |           | [phone] (Fax)   |             |
+--------+---------+-----------+----------------------+-------------+
 as of this encounter
 
 Visit Diagnoses
 Not on filein this encounter

## 2019-04-03 NOTE — XMS
Encounter Summary
  Created on: 2019
 
 Cherie Villalobos
 External Reference #: [id number]
 : 48
 Sex: Female
 
 Demographics
 
 
+-----------------------+--------------------------+
| Address               | [address]               |
|                       | [place]  [address] |
+-----------------------+--------------------------+
| Home Phone            | [phone]          |
+-----------------------+--------------------------+
| Preferred Language    | Unknown                  |
+-----------------------+--------------------------+
| Marital Status        |                   |
+-----------------------+--------------------------+
| Hoahaoism Affiliation | 1013                     |
+-----------------------+--------------------------+
| Race                  | Unknown                  |
+-----------------------+--------------------------+
| Ethnic Group          | Unknown                  |
+-----------------------+--------------------------+
 
 
 Author
 
 
+--------------+-----------------------+
| Author       | [facility] [employer] |
+--------------+-----------------------+
| Organization | [facility] |
+--------------+-----------------------+
| Address      | Unknown               |
+--------------+-----------------------+
| Phone        | Unavailable           |
+--------------+-----------------------+
 
 
 
 Support
 
 
+---------------+--------------+---------------------+-----------------+
| Name          | Relationship | Address             | Phone           |
+---------------+--------------+---------------------+-----------------+
| Anoop Villalobos | ECON         | [place], | [phone] |
|               |              |  [place]  [address]     |                 |
+---------------+--------------+---------------------+-----------------+
| Jennifer Dickson | ECON         | [place]       | [phone] |
|               |              | [phone]               |                 |
+---------------+--------------+---------------------+-----------------+
 
 
 
 
 Care Team Providers
 
 
+-----------------------+------+-----------------+
| Care Team Member Name | Role | Phone           |
+-----------------------+------+-----------------+
| Ivy Couch DO      | PCP  | [phone] |
+-----------------------+------+-----------------+
 
 
 
 Reason for Visit
 
 
+--------------------+------------------------------+
| Reason             | Comments                     |
+--------------------+------------------------------+
| Medication Problem | Requesting Rx for Wheelchair |
+--------------------+------------------------------+
 
 
 
 Encounter Details
 
 
+--------+-----------+----------------------+----------------------+---------------------+
| Date   | Type      | Department           | Care Team            | Description         |
+--------+-----------+----------------------+----------------------+---------------------+
| / | Telephone |   [facility] |   Srinivasan Jordan,  | Medication Problem  |
| 2019   |           |  and Ankle  [address]      | [facility]  [address] [facility] [facility] | (Requesting Rx for  |
|        |           | [address]   |  [address]  [place],  | Wheelchair)         |
|        |           | [place], [place]         | [place] [address]             |                     |
|        |           | [address]           | [phone]         |                     |
|        |           | [phone]         | [phone] (Fax)   |                     |
+--------+-----------+----------------------+----------------------+---------------------+
 
 
 
 Social History
 
 
+---------------+------------+-----------+--------+------------------+
| Tobacco Use   | Types      | Packs/Day | Years  | Date             |
|               |            |           | Used   |                  |
+---------------+------------+-----------+--------+------------------+
| Former Smoker | Cigarettes | 1         | 30     | Quit: 2004 |
+---------------+------------+-----------+--------+------------------+
 
 
 
+---------------------+---+---+---+
| Smokeless Tobacco:  |   |   |   |
| Never Used          |   |   |   |
+---------------------+---+---+---+
 
 
 
+------------------------------+
| Comments: quite smoking  |
 
+------------------------------+
 
 
 
+-------------+-----------+---------+----------+
| Alcohol Use | Drinks/We | oz/Week | Comments |
|             | ek        |         |          |
+-------------+-----------+---------+----------+
| No          |           |         |          |
+-------------+-----------+---------+----------+
 
 
 
+------------------+---------------+
| Sex Assigned at  | Date Recorded |
| Birth            |               |
+------------------+---------------+
| Not on file      |               |
+------------------+---------------+
 as of this encounter
 
 Plan of Treatment
 
 
+--------+---------+-----------+----------------------+-------------+
| Date   | Type    | Specialty | Care Team            | Description |
+--------+---------+-----------+----------------------+-------------+
| 04/10/ | Office  | Podiatry  |   Srinivasan Jordan,  |             |
|    | Visit   |           | DPM  [address], |             |
|        |         |           |  [address]  [place],  |             |
|        |         |           | [place] [address]             |             |
|        |         |           | [phone]         |             |
|        |         |           | [phone] (Fax)   |             |
+--------+---------+-----------+----------------------+-------------+
 as of this encounter
 
 Visit Diagnoses
 Not on filein this encounter

## 2019-04-03 NOTE — XMS
Encounter Summary
  Created on: 2019
 
 Cherie Villalobos
 External Reference #: [id number]
 : 48
 Sex: Female
 
 Demographics
 
 
+-----------------------+--------------------------+
| Address               | [address]               |
|                       | [place]  [address] |
+-----------------------+--------------------------+
| Home Phone            | [phone]          |
+-----------------------+--------------------------+
| Preferred Language    | Unknown                  |
+-----------------------+--------------------------+
| Marital Status        |                   |
+-----------------------+--------------------------+
| [employer]ar[employer] Affiliation | 1013                     |
+-----------------------+--------------------------+
| Race                  | Unknown                  |
+-----------------------+--------------------------+
| Ethnic Group          | Unknown                  |
+-----------------------+--------------------------+
 
 
 Author
 
 
+--------------+-----------------------+
| Author       | [facility] [employer] |
+--------------+-----------------------+
| Organization | [facility] |
+--------------+-----------------------+
| Address      | Unknown               |
+--------------+-----------------------+
| Phone        | Unavailable           |
+--------------+-----------------------+
 
 
 
 Support
 
 
+---------------+--------------+---------------------+-----------------+
| Name          | Relationship | Address             | Phone           |
+---------------+--------------+---------------------+-----------------+
| Anoop Villalobos | ECON         | [place], | [phone] |
|               |              |  [place]  [address]     |                 |
+---------------+--------------+---------------------+-----------------+
| Jennifer Dickson | ECON         | [place]       | [phone] |
|               |              | [address]               |                 |
+---------------+--------------+---------------------+-----------------+
 
 
 
 
 Care Team Providers
 
 
+-----------------------+------+-----------------+
| Care Team Member Name | Role | Phone           |
+-----------------------+------+-----------------+
| Ivy Couch DO      | PCP  | [phone] |
+-----------------------+------+-----------------+
 
 
 
 Encounter Details
 
 
+--------+-------------+----------------------+---------------------+-------------+
| Date   | Type        | Department           | Care Team           | Description |
+--------+-------------+----------------------+---------------------+-------------+
| / | Orders Only |   [facility]      |   Dionne Danielson MA |             |
| 2019   |             | Vascular Surgery     |                     |             |
|        |             | [address] |                     |             |
|        |             |  E  [place]     |                     |             |
|        |             | [address]           |                     |             |
|        |             | [phone]         |                     |             |
+--------+-------------+----------------------+---------------------+-------------+
 
 
 
 Social History
 
 
+---------------+------------+-----------+--------+------------------+
| Tobacco Use   | Types      | Packs/Day | Years  | Date             |
|               |            |           | Used   |                  |
+---------------+------------+-----------+--------+------------------+
| Former Smoker | Cigarettes | 1         | 30     | Quit: 2004 |
+---------------+------------+-----------+--------+------------------+
 
 
 
+---------------------+---+---+---+
| Smokeless Tobacco:  |   |   |   |
| Never Used          |   |   |   |
+---------------------+---+---+---+
 
 
 
+------------------------------+
| Comments: quite smoking 2004 |
+------------------------------+
 
 
 
+-------------+-----------+---------+----------+
| Alcohol Use | Drinks/We | oz/Week | Comments |
|             | ek        |         |          |
+-------------+-----------+---------+----------+
| No          |           |         |          |
+-------------+-----------+---------+----------+
 
 
 
 
+------------------+---------------+
| Sex Assigned at  | Date Recorded |
| Birth            |               |
+------------------+---------------+
| Not on file      |               |
+------------------+---------------+
 as of this encounter
 
 Plan of Treatment
 
 
+--------+---------+-----------+----------------------+-------------+
| Date   | Type    | Specialty | Care Team            | Description |
+--------+---------+-----------+----------------------+-------------+
| 04/10/ | Office  | Podiatry  |   Srinivasan Jordan,  |             |
| 2019   | Visit   |           | DPM  [address], |             |
|        |         |           |  [address]  [place],  |             |
|        |         |           | [place] [address]             |             |
|        |         |           | [phone]         |             |
|        |         |           | [phone] (Fax)   |             |
+--------+---------+-----------+----------------------+-------------+
 as of this encounter
 
 Visit Diagnoses
 Not on filein this encounter

## 2019-04-03 NOTE — XMS
Encounter Summary
  Created on: 2019
 
 Cherie Villalobos
 External Reference #: [id number]
 : 48
 Sex: Female
 
 Demographics
 
 
+-----------------------+--------------------------+
| Address               | [address]               |
|                       | [place]  [address] |
+-----------------------+--------------------------+
| Home Phone            | [phone]          |
+-----------------------+--------------------------+
| Preferred Language    | Unknown                  |
+-----------------------+--------------------------+
| Marital Status        |                   |
+-----------------------+--------------------------+
| Hindu Affiliation | 1013                     |
+-----------------------+--------------------------+
| Race                  | Unknown                  |
+-----------------------+--------------------------+
| Ethnic Group          | Unknown                  |
+-----------------------+--------------------------+
 
 
 Author
 
 
+--------------+-----------------------+
| Author       | [facility] [employer] |
+--------------+-----------------------+
| Organization | [facility] |
+--------------+-----------------------+
| Address      | Unknown               |
+--------------+-----------------------+
| Phone        | Unavailable           |
+--------------+-----------------------+
 
 
 
 Support
 
 
+---------------+--------------+---------------------+-----------------+
| Name          | Relationship | Address             | Phone           |
+---------------+--------------+---------------------+-----------------+
| Anoop Villalobos | ECON         | [place], | [phone] |
|               |              |  [place]  [address]     |                 |
+---------------+--------------+---------------------+-----------------+
| Jennifer Dickson | ECON         | [place]       | [phone] |
|               |              | [address]               |                 |
+---------------+--------------+---------------------+-----------------+
 
 
 
 
 Care Team Providers
 
 
+-----------------------+------+-----------------+
| Care Team Member Name | Role | Phone           |
+-----------------------+------+-----------------+
| Ivy Couch DO      | PCP  | [phone] |
+-----------------------+------+-----------------+
 
 
 
 Reason for Visit
 
 
+-----------+-------------+
| Reason    | Comments    |
+-----------+-------------+
| Follow-up | appointment |
+-----------+-------------+
 
 
 
 Encounter Details
 
 
+--------+-----------+----------------------+----------------------+---------------+
| Date   | Type      | Department           | Care Team            | Description   |
+--------+-----------+----------------------+----------------------+---------------+
| / | Telephone |   [facility] |   Srinivasan Jordan,  | Follow-up     |
| 2019   |           |  and Ankle  [address]      | DPM  [address], | (appointment) |
|        |           | [address]   |  [address]  [place],  |               |
|        |           | [place], [place]         | [place] [address]             |               |
|        |           | [address]           | [phone]         |               |
|        |           | [phone]         | [phone] (Fax)   |               |
+--------+-----------+----------------------+----------------------+---------------+
 
 
 
 Social History
 
 
+---------------+------------+-----------+--------+------------------+
| Tobacco Use   | Types      | Packs/Day | Years  | Date             |
|               |            |           | Used   |                  |
+---------------+------------+-----------+--------+------------------+
| Former Smoker | Cigarettes | 1         | 30     | Quit: 2004 |
+---------------+------------+-----------+--------+------------------+
 
 
 
+---------------------+---+---+---+
| Smokeless Tobacco:  |   |   |   |
| Never Used          |   |   |   |
+---------------------+---+---+---+
 
 
 
+------------------------------+
| Comments: quite smoking 2004 |
 
+------------------------------+
 
 
 
+-------------+-----------+---------+----------+
| Alcohol Use | Drinks/We | oz/Week | Comments |
|             | ek        |         |          |
+-------------+-----------+---------+----------+
| No          |           |         |          |
+-------------+-----------+---------+----------+
 
 
 
+------------------+---------------+
| Sex Assigned at  | Date Recorded |
| Birth            |               |
+------------------+---------------+
| Not on file      |               |
+------------------+---------------+
 as of this encounter
 
 Plan of Treatment
 
 
+--------+---------+-----------+----------------------+-------------+
| Date   | Type    | Specialty | Care Team            | Description |
+--------+---------+-----------+----------------------+-------------+
| 04/10/ | Office  | Podiatry  |   Srinivasan Jordan,  |             |
|    | Visit   |           | DPCLARE  [address], |             |
|        |         |           |  [address]  [place],  |             |
|        |         |           | [place] [address]             |             |
|        |         |           | [phone]         |             |
|        |         |           | [phone] (Fax)   |             |
+--------+---------+-----------+----------------------+-------------+
 as of this encounter
 
 Visit Diagnoses
 Not on filein this encounter

## 2019-04-03 NOTE — XMS
Encounter Summary
  Created on: 2019
 
 Cherie Villalobos
 External Reference #: [id number]
 : 48
 Sex: Female
 
 Demographics
 
 
+-----------------------+--------------------------+
| Address               | [address]               |
|                       | [place]  [address] |
+-----------------------+--------------------------+
| Home Phone            | [phone]          |
+-----------------------+--------------------------+
| Preferred Language    | Unknown                  |
+-----------------------+--------------------------+
| Marital Status        |                   |
+-----------------------+--------------------------+
| Congregation Affiliation | 1013                     |
+-----------------------+--------------------------+
| Race                  | Unknown                  |
+-----------------------+--------------------------+
| Ethnic Group          | Unknown                  |
+-----------------------+--------------------------+
 
 
 Author
 
 
+--------------+-----------------------+
| Author       | [facility] [employer] |
+--------------+-----------------------+
| Organization | [facility] |
+--------------+-----------------------+
| Address      | Unknown               |
+--------------+-----------------------+
| Phone        | Unavailable           |
+--------------+-----------------------+
 
 
 
 Support
 
 
+---------------+--------------+---------------------+-----------------+
| Name          | Relationship | Address             | Phone           |
+---------------+--------------+---------------------+-----------------+
| Anoop Villalobos | ECON         | [place], | [phone] |
|               |              |  [place]  [address]     |                 |
+---------------+--------------+---------------------+-----------------+
| Jennifer Dickson | ECON         | [place]       | [phone] |
|               |              | [address]               |                 |
+---------------+--------------+---------------------+-----------------+
 
 
 
 
 Care Team Providers
 
 
+-----------------------+------+-----------------+
| Care Team Member Name | Role | Phone           |
+-----------------------+------+-----------------+
| Ivy Couch DO      | PCP  | [phone] |
+-----------------------+------+-----------------+
 
 
 
 Reason for Visit
 
 
+--------+--------------------------------+
| Reason | Comments                       |
+--------+--------------------------------+
| Other  | [facility] medical records request |
+--------+--------------------------------+
 
 
 
 Encounter Details
 
 
+--------+-------------+----------------------+---------------------+------------------+
| Date   | Type        | Department           | Care Team           | Description      |
+--------+-------------+----------------------+---------------------+------------------+
| / | Documentati |   [facility]      |   Peabody, Jessica  | Other [facility]    |
| 2019   | on Only     | [address]        | [facility]               | medical records  |
|        |             | [address]   |                     | request)         |
|        |             | [place] [address]   |                     |                  |
|        |             | [phone]         |                     |                  |
+--------+-------------+----------------------+---------------------+------------------+
 
 
 
 Social History
 
 
+---------------+------------+-----------+--------+------------------+
| Tobacco Use   | Types      | Packs/Day | Years  | Date             |
|               |            |           | Used   |                  |
+---------------+------------+-----------+--------+------------------+
| Former Smoker | Cigarettes | 1         | 30     | Quit: 2004 |
+---------------+------------+-----------+--------+------------------+
 
 
 
+---------------------+---+---+---+
| Smokeless Tobacco:  |   |   |   |
| Never Used          |   |   |   |
+---------------------+---+---+---+
 
 
 
+------------------------------+
| Comments: quite smoking 2004 |
+------------------------------+
 
 
 
 
+-------------+-----------+---------+----------+
| Alcohol Use | Drinks/We | oz/Week | Comments |
|             | ek        |         |          |
+-------------+-----------+---------+----------+
| No          |           |         |          |
+-------------+-----------+---------+----------+
 
 
 
+------------------+---------------+
| Sex Assigned at  | Date Recorded |
| Birth            |               |
+------------------+---------------+
| Not on file      |               |
+------------------+---------------+
 as of this encounter
 
 Progress Notes
 Peabody, Jessica M, RN - 2019  6:22 PM [facility] medical records request was denied. 
Fax sent forward to [facility] (F#[phone]). Fax confirmation received. in this en
counter
 
 Plan of Treatment
 
 
+--------+---------+-----------+----------------------+-------------+
| Date   | Type    | Specialty | Care Team            | Description |
+--------+---------+-----------+----------------------+-------------+
| 04/10/ | Office  | Podiatry  |   Srinivasan Jordan,  |             |
|    | Visit   |           | DPM  [address], |             |
|        |         |           |  [address]  [place],  |             |
|        |         |           | [place] [address]             |             |
|        |         |           | [phone]         |             |
|        |         |           | [phone] (Fax)   |             |
+--------+---------+-----------+----------------------+-------------+
 as of this encounter
 
 Visit Diagnoses
 Not on filein this encounter

## 2019-04-03 NOTE — XMS
Encounter Summary
  Created on: 2019
 
 Cherie Villalobos
 External Reference #: [id number]
 : 48
 Sex: Female
 
 Demographics
 
 
+-----------------------+--------------------------+
| Address               | [address]               |
|                       | [place]  [address] |
+-----------------------+--------------------------+
| Home Phone            | [phone]          |
+-----------------------+--------------------------+
| Preferred Language    | Unknown                  |
+-----------------------+--------------------------+
| Marital Status        |                   |
+-----------------------+--------------------------+
| Anabaptist Affiliation | [id number]                     |
+-----------------------+--------------------------+
| Race                  | Unknown                  |
+-----------------------+--------------------------+
| Ethnic Group          | Unknown                  |
+-----------------------+--------------------------+
 
 
 Author
 
 
+--------------+-----------------------+
| Author       | Sherry [employer] |
+--------------+-----------------------+
| Organization | [facility] |
+--------------+-----------------------+
| Address      | Unknown               |
+--------------+-----------------------+
| Phone        | Unavailable           |
+--------------+-----------------------+
 
 
 
 Support
 
 
+---------------+--------------+---------------------+-----------------+
| Name          | Relationship | Address             | Phone           |
+---------------+--------------+---------------------+-----------------+
| Anoop Villalobos | ECON         | [place], | [phone] |
|               |              |  [place]  [address]     |                 |
+---------------+--------------+---------------------+-----------------+
| Jennifer Dickson | ECON         | [place]       | [phone] |
|               |              | [address]               |                 |
+---------------+--------------+---------------------+-----------------+
 
 
 
 
 Care Team Providers
 
 
+-----------------------+------+-----------------+
| Care Team Member Name | Role | Phone           |
+-----------------------+------+-----------------+
| Ivy Couch DO      | PCP  | [phone] |
+-----------------------+------+-----------------+
 
 
 
 Encounter Details
 
 
+--------+------------+----------------------+-----------+-------------+
| Date   | Type       | Department           | Care Team | Description |
+--------+------------+----------------------+-----------+-------------+
| / | Procedure  |   [facility]    |           |             |
| 2019   | Pass       | [facility]   |           |             |
|        |            | Floor [address] |           |             |
|        |            |   [address] [facility]     |           |             |
|        |            | [place] [address]   |           |             |
|        |            | [phone]         |           |             |
+--------+------------+----------------------+-----------+-------------+
 
 
 
 Social History
 
 
+---------------+------------+-----------+--------+------------------+
| Tobacco Use   | Types      | Packs/Day | Years  | Date             |
|               |            |           | Used   |                  |
+---------------+------------+-----------+--------+------------------+
| Former Smoker | Cigarettes | 1         | 30     | Quit: 2004 |
+---------------+------------+-----------+--------+------------------+
 
 
 
+---------------------+---+---+---+
| Smokeless Tobacco:  |   |   |   |
| Never Used          |   |   |   |
+---------------------+---+---+---+
 
 
 
+------------------------------+
| Comments: quite smoking  |
+------------------------------+
 
 
 
+-------------+-----------+---------+----------+
| Alcohol Use | Drinks/We | oz/Week | Comments |
|             | ek        |         |          |
+-------------+-----------+---------+----------+
| No          |           |         |          |
+-------------+-----------+---------+----------+
 
 
 
 
+------------------+---------------+
| Sex Assigned at  | Date Recorded |
| Birth            |               |
+------------------+---------------+
| Not on file      |               |
+------------------+---------------+
 as of this encounter
 
 Plan of Treatment
 
 
+--------+---------+-----------+----------------------+-------------+
| Date   | Type    | Specialty | Care Team            | Description |
+--------+---------+-----------+----------------------+-------------+
| 04/10/ | Office  | Podiatry  |   Srinivasan Jordan,  |             |
| 2019   | Visit   |           | DPM  [address], |             |
|        |         |           |  [place],  |             |
|        |         |           | [place] [address]             |             |
|        |         |           | [phone]         |             |
|        |         |           | [phone] (Fax)   |             |
+--------+---------+-----------+----------------------+-------------+
 as of this encounter
 
 Visit Diagnoses
 Not on filein this encounter

## 2019-04-03 NOTE — XMS
Encounter Summary
  Created on: 2019
 
 Cherie Villalobos
 External Reference #: [id number]
 : 48
 Sex: Female
 
 Demographics
 
 
+-----------------------+--------------------------+
| Address               | [address]               |
|                       | [place]  [address] |
+-----------------------+--------------------------+
| Home Phone            | [phone]          |
+-----------------------+--------------------------+
| Preferred Language    | Unknown                  |
+-----------------------+--------------------------+
| Marital Status        |                   |
+-----------------------+--------------------------+
| Holiness Affiliation | 1013                     |
+-----------------------+--------------------------+
| Race                  | Unknown                  |
+-----------------------+--------------------------+
| Ethnic Group          | Unknown                  |
+-----------------------+--------------------------+
 
 
 Author
 
 
+--------------+-----------------------+
| Author       | Sherry Aquafadas |
+--------------+-----------------------+
| Organization | [facility] |
+--------------+-----------------------+
| Address      | Unknown               |
+--------------+-----------------------+
| Phone        | Unavailable           |
+--------------+-----------------------+
 
 
 
 Support
 
 
+---------------+--------------+---------------------+-----------------+
| Name          | Relationship | Address             | Phone           |
+---------------+--------------+---------------------+-----------------+
| Anoop Villalobos | ECON         | [place], | [phone] |
|               |              |  [place]  [address]     |                 |
+---------------+--------------+---------------------+-----------------+
| Jennifer Dickson | ECON         | [place]       | [phone] |
|               |              | [phone]               |                 |
+---------------+--------------+---------------------+-----------------+
 
 
 
 
 Care Team Providers
 
 
+-----------------------+------+-----------------+
| Care Team Member Name | Role | Phone           |
+-----------------------+------+-----------------+
| Ivy Couch DO      | PCP  | [phone] |
+-----------------------+------+-----------------+
 
 
 
 Reason for Visit
 
 
+--------+------------------------------------------------------+
| Reason | Comments                                             |
+--------+------------------------------------------------------+
| Aikn  | [facility] Note |
+--------+------------------------------------------------------+
 
 
 
 Encounter Details
 
 
+--------+-------------+----------------------+----------------------+----------------------
+
| Date   | Type        | Department           | Care Team            | Description          
|
+--------+-------------+----------------------+----------------------+----------------------
+
| / | Documentati |   [facility]      |   João Asher MD  | Other (February      
|
|    | on Only     | [place]  [address]        |  [address]   | [facility] Provider 
|
|        |             | [address]   | [address]  [place], [place]    |  Dialysis Rounding   
|
|        |             | [place] [address]   | [address] [phone]  | Note)                
|
|        |             | [phone]         |  [phone] (Fax)  |                      
|
+--------+-------------+----------------------+----------------------+----------------------
+
 
 
 
 Social History
 
 
+---------------+------------+-----------+--------+------------------+
| Tobacco Use   | Types      | Packs/Day | Years  | Date             |
|               |            |           | Used   |                  |
+---------------+------------+-----------+--------+------------------+
| Former Smoker | Cigarettes | 1         | 30     | Quit: 2004 |
+---------------+------------+-----------+--------+------------------+
 
 
 
+---------------------+---+---+---+
 
| Smokeless Tobacco:  |   |   |   |
| Never Used          |   |   |   |
+---------------------+---+---+---+
 
 
 
+------------------------------+
| Comments: quite smoking  |
+------------------------------+
 
 
 
+-------------+-----------+---------+----------+
| Alcohol Use | Drinks/We | oz/Week | Comments |
|             | ek        |         |          |
+-------------+-----------+---------+----------+
| No          |           |         |          |
+-------------+-----------+---------+----------+
 
 
 
+------------------+---------------+
| Sex Assigned at  | Date Recorded |
| Birth            |               |
+------------------+---------------+
| Not on file      |               |
+------------------+---------------+
 as of this encounter
 
 Plan of Treatment
 
 
+--------+---------+-----------+----------------------+-------------+
| Date   | Type    | Specialty | Care Team            | Description |
+--------+---------+-----------+----------------------+-------------+
| 04/10/ | Office  | Podiatry  |   Srinivasan Jordan,  |             |
|    | Visit   |           | DPM  [address], |             |
|        |         |           |  [address]  [place],  |             |
|        |         |           | [place] [address]             |             |
|        |         |           | [phone]         |             |
|        |         |           | [phone] (Fax)   |             |
+--------+---------+-----------+----------------------+-------------+
 as of this encounter
 
 Visit Diagnoses
 Not on filein this encounter

## 2019-04-03 NOTE — XMS
Encounter Summary
  Created on: 2019
 
 Cherie Villalobos
 External Reference #: [id number]
 : 48
 Sex: Female
 
 Demographics
 
 
+-----------------------+--------------------------+
| Address               | [address]               |
|                       | [place]  [address] |
+-----------------------+--------------------------+
| Home Phone            | [phone]          |
+-----------------------+--------------------------+
| Preferred Language    | Unknown                  |
+-----------------------+--------------------------+
| Marital Status        |                   |
+-----------------------+--------------------------+
| Orthodoxy Affiliation | 1013                     |
+-----------------------+--------------------------+
| Race                  | Unknown                  |
+-----------------------+--------------------------+
| Ethnic Group          | Unknown                  |
+-----------------------+--------------------------+
 
 
 Author
 
 
+--------------+-----------------------+
| Author       | [employer] |
+--------------+-----------------------+
| Organization | [facility] [employer] |
+--------------+-----------------------+
| Address      | Unknown               |
+--------------+-----------------------+
| Phone        | Unavailable           |
+--------------+-----------------------+
 
 
 
 Support
 
 
+---------------+--------------+---------------------+-----------------+
| Name          | Relationship | Address             | Phone           |
+---------------+--------------+---------------------+-----------------+
| Anoop Villalobos | ECON         | [place], | [phone] |
|               |              |  [place]  [address]     |                 |
+---------------+--------------+---------------------+-----------------+
| Jennifer Dickson | ECON         | [place]       | [phone] |
|               |              | [id number]               |                 |
+---------------+--------------+---------------------+-----------------+
 
 
 
 
 Care Team Providers
 
 
+-----------------------+------+-----------------+
| Care Team Member Name | Role | Phone           |
+-----------------------+------+-----------------+
| Ivy Couch DO      | PCP  | [phone] |
+-----------------------+------+-----------------+
 
 
 
 Reason for Visit
 
 
+---------------------+----------+
| Reason              | Comments |
+---------------------+----------+
| Chest Pain          |          |
+---------------------+----------+
| Shortness of Breath |          |
+---------------------+----------+
| Nausea              |          |
+---------------------+----------+
 Auth/Cert
 
+--------+--------+-----------+--------------+--------------+---------------+
| Status | Reason | Specialty | Diagnoses /  | Referred By  | Referred To   |
|        |        |           | Procedures   | Contact      | Contact       |
+--------+--------+-----------+--------------+--------------+---------------+
|        |        | Internal  |   Diagnoses  |              |   [facility]    |
|        |        | Medicine  |  Elevated    |              | [address]   |
|        |        |           | blood        |              | [address]
|        |        |           | pressure     |              |  [address]   |
|        |        |           | reading  S/P |              | [place], [place]  |
|        |        |           |  M[place] (mitral |              | [phone]  Phone: |
|        |        |           |  valve       |              |  [phone] |
|        |        |           | repair)  Hx  |              |   Fax:        |
|        |        |           | of CABG      |              | [phone]  |
|        |        |           | Chest pain   |              |               |
|        |        |           | in adult     |              |               |
|        |        |           |              |              |               |
+--------+--------+-----------+--------------+--------------+---------------+
 
 
 
 
 Encounter Details
 
 
+--------+-----------+----------------------+----------------------+----------------------+
| Date   | Type      | Department           | Care Team            | Description          |
+--------+-----------+----------------------+----------------------+----------------------+
| / | Emergency |   [facility]    |   Nathaniel De Luna, | Hx of CABG (Primary  |
| 2019 - |           | [facility]   |  DO  [address]  | Dx); Chest pain in   |
|        |           | [facility] |  EMERGENCY           | adult; S/P [place]       |
| / |           |   [address]     | DEPARTMENT           | (mitral valve        |
|    |           | [place] [address]   | [place] [address]   | repair); Elevated    |
|        |           | [phone]         | [phone]         | blood pressure       |
|        |           |                      | [phone] (Fax)   | reading; Chronic     |
 
|        |           |                      | Negro Lr, DO    | systolic congestive  |
|        |           |                      | [address]  [address]  | heart failure (HCC); |
|        |           |                      | [address]           |  Chest pain,         |
|        |           |                      | [place] [address]   | unspecified type     |
|        |           |                      | [phone]         |                      |
|        |           |                      | [phone] (Fax)   |                      |
|        |           |                      | Silas Ferrara MD  [address]                      |
|        |           |                      |  [address]  [address]     |                      |
|        |           |                      | [address]           |                      |
|        |           |                      | [place] [address]   |                      |
|        |           |                      | [phone]         |                      |
|        |           |                      | [phone] (Fax)   |                      |
+--------+-----------+----------------------+----------------------+----------------------+
 
 
 
 Social History
 
 
+---------------+------------+-----------+--------+------------------+
| Tobacco Use   | Types      | Packs/Day | Years  | Date             |
|               |            |           | Used   |                  |
+---------------+------------+-----------+--------+------------------+
| Former Smoker | Cigarettes | 1         | 30     | Quit: 2004 |
+---------------+------------+-----------+--------+------------------+
 
 
 
+---------------------+---+---+---+
| Smokeless Tobacco:  |   |   |   |
| Never Used          |   |   |   |
+---------------------+---+---+---+
 
 
 
+------------------------------+
| Comments: quite smoking  |
+------------------------------+
 
 
 
+-------------+-----------+---------+----------+
| Alcohol Use | Drinks/We | oz/Week | Comments |
|             | ek        |         |          |
+-------------+-----------+---------+----------+
| No          |           |         |          |
+-------------+-----------+---------+----------+
 
 
 
+------------------+---------------+
| Sex Assigned at  | Date Recorded |
| Birth            |               |
+------------------+---------------+
| Not on file      |               |
+------------------+---------------+
 as of this encounter
 
 Last Filed Vital Signs
 
 
 
+-------------------+----------------------+-------------------------+
| Vital Sign        | Reading              | Time Taken              |
+-------------------+----------------------+-------------------------+
| Blood Pressure    | 108/56               | 2019 11:46 AM PST |
+-------------------+----------------------+-------------------------+
| Pulse             | 68                   | 2019 11:46 AM PST |
+-------------------+----------------------+-------------------------+
| Temperature       | 36.5   C (97.7   F)  | 2019 11:46 AM PST |
+-------------------+----------------------+-------------------------+
| Respiratory Rate  | 20                   | 2019 11:46 AM PST |
+-------------------+----------------------+-------------------------+
| Oxygen Saturation | 92%                  | 2019 11:46 AM PST |
+-------------------+----------------------+-------------------------+
| Inhaled Oxygen    | -                    | -                       |
| Concentration     |                      |                         |
+-------------------+----------------------+-------------------------+
| Weight            | 65.5 kg (144 lb 6.4  | 2019  3:16 AM PST |
|                   | oz)                  |                         |
+-------------------+----------------------+-------------------------+
| Height            | 177.8 cm (5' 10")    | 2019  1:52 PM PST |
+-------------------+----------------------+-------------------------+
| Body Mass Index   | 20.72                | 2019  3:16 AM PST |
+-------------------+----------------------+-------------------------+
 in this encounter
 
 Discharge Summaries
 Silas Ferrara MD - 2019  8:20 AM PSTFormatting of this note may be different from the 
original.
 [facility]
 Service:  Hospitalist
 Discharge Summary
 
 Date of Admission:  2019
 Date of Discharge:   2019
 
 Discharge Physician:  Silas Ferrara MD
 Treatment Team: 
 Admitting Provider: Negro Lr DO
 Discharge Diagnoses:   
 
 Principal Problem:
   Chest pain
 Active Problems:
   Coronary artery disease involving native coronary artery of native heart without angina p
ectoris
   ESRD (end stage renal disease) ([facility])
   Type 2 diabetes mellitus with chronic kidney disease on chronic dialysis, with long-term 
current use of insulin (HCC)
   Anemia in ESRD (end-stage renal disease) ([facility])
   Hypertension, essential
   Pleural effusion
   Chronic systolic congestive heart failure (HCC)
   Chronic diarrhea
   Chronic anticoagulation
   Cardiomyopathy (HCC)
   COPD (chronic obstructive pulmonary disease) ([facility])
   ICD (implantable cardioverter-defibrillator) in place
   Paroxysmal atrial fibrillation (HCC)
   Chronic multifocal osteomyelitis of left foot ([facility])
 
   PVD (peripheral vascular disease) ([facility])
 Resolved Problems:
   * No resolved hospital problems. *
 
 Procedures:  * No surgery found *
 
 Significant Diagnostic Studies:  No results found.
 
 BRIEF HISTORY OF PRESENTATION:  
      Cherie Villalobos is a 68 y.o. female who presented per H&P"from [facility] of chest pain that started last night about midnight and lasted until about 5 AM this morn
ing, pressure, intense, nonradiating, with nausea and dyspnea, no sweating. Improved with SL
 NTG initially, but the NTG's became less effective as she took more of them (5 total). Got 
a NTG patch at [facility] ED which was very effective in controlling the pain. She has ve
ry complicated cardiac history including CAD, CABG, systolic CHF, ischemic cardiomyopathy, p
acemaker, valve repair, afib, anticoagulation, with several other comorbidities including CO
PD, ESRD on HD, DM2, PVD. She has a cardiologist in [place] but [facility] refused to
 call that doctor to aid in decision-making and simply transferred the patient to [facility] ED.
 
 The patient was at [facility] a month ago for L foot toe amputation due to osteomyelitis. Dr Fr correia is her surgeon. During that hospitalization she c/o chest pain with troponin elevation
 to 0.318 and was evaluated by cardiology. Stress test a month prior had been normal. It was
 decided that medical management was the only reasonable option for the patient, given her e
xtensive comorbidities.
 
 Post-amputation she has been instructed to be strict NWB on LLE, and she was sent to [facility]. She remains on abx post-procedure managed by Dr Elliott. The patient is convinced that [facility] is not administering her meds correctly. She thinks they might be skipping her Eliqui
s, clopidogrel, and carvedilol. This happened before, earlier in 2018. She says the nurses dakota miller that the meds are given but they are not given. She does not want to return to [facility],
 if at all possible, and wants to return home with paid caregiver if that will comply with STEVEN Jordan's post-op instructions"
 HOSPITAL COURSE:  
 She was seen by  and he recc to stop all IV abx.
 She was discharged home with full time care giver. 
 She was seen by  and he recc medical management 
 Patient was discharged in stable condition. Addressed all of their questions and covered wi
th side affects of newly added medications. she was cleared per consulting services.
 
 Past Medical History 
 Diagnosis Date 
   Acute MI (HCC)  
  with stenting x 1 
   Anemia associated with chronic renal failure 2016 
   Atrial fibrillation (HCC)  
   Chronic kidney disease  
   Congestive heart failure (HCC)  
   COPD (chronic obstructive pulmonary disease) (HCC)  
   COPD (chronic obstructive pulmonary disease) ([facility]) 2018 
   Coronary artery disease  
  stent placements: 3 total 
   Depression  
   Diabetes other 
  abstracted 
   Diabetes mellitus, type 2 ([facility])  
   ESRD on peritoneal dialysis ([facility])  
   GERD (gastroesophageal reflux disease)  
   Hemodialysis patient ([facility]) 10/2016 
  Stopped peritoneal and restarted hemodialysis 
   Hemorrhage of gastrointestinal tract, unspecified 2017 
 
  low GI, rectal bleeding 
   High blood pressure other 
  abstracted 
   High cholesterol other 
  abstracted 
   Hyperlipidemia  
   Hypertension  
   Hyponatremia 2017 
   Myocardial infarction ([facility]) 2017 
   Other chronic pain  
  feet,  
   Pain of left hip joint 2016 
  due to hip fracture and replacement 
   Partial small bowel obstruction ([facility]) 2017 
  resolved 
   Renal failure  
  Stage 5.   Fistula in the JAN - not on dialysis yet. 
   Unspecified visual disturbance  
  glasses 
 
 Past Surgical History 
 Procedure Laterality Date 
   AV FISTULA PLACEMENT   
  left upper arm AV fistula - failed 
   AV FISTULA REPAIR N/A 10/25/2016 
  Procedure: AV FISTULA - GRAFT REPAIR/REVISION;  Surgeon: Kirt Mclaughlin MD;  Location: [facility]
IN OR;  Service: Vascular;  Laterality: N/A;  Superficialization and revision of left arm fi
stula 
   CARDIAC CATHETERIZATION  2017 
   CARPAL TUNNEL RELEASE Bilateral other 
  abstracted 
   CATARACT EXTRACTION Bilateral  
   CATHETER REMOVAL N/A 2016 
  Procedure: DIALYSIS CATHETER - REMOVAL;  Surgeon: Kirt Mclaughlin MD;  Location: [facility] MAIN OR; 
 Service: Vascular;  Laterality: N/A;  PD cath removal 
   CHOLECYSTECTOMY  other 
  abstracted 
   COLONOSCOPY   
   CORONARY ARTERY BYPASS GRAFT   
   CORONARY STENT PLACEMENT  2017 
  Drug Elutin stents to OM, 1 stent to prox. LAD 
   EYE SURGERY   
   FOOT AMPUTATION Left 2018 
  Procedure: FOREFOOT - AMPUTATION;  Surgeon: Srinivasan Jordan DPM;  Location: [facility] MAIN OR;
  Service: Podiatry;  Laterality: Left; 
   HARDWARE PRESENT   
  stent placements x 3, Left hip replacement, vascular stents 2 in left leg 
   HIP ARTHROPLASTY Left 2016 
  Procedure: HIP - ARTHROPLASTY(ALLISON);  Surgeon: Uriel Roa MD;  Location: [facility] MAIN 
OR;  Service: Orthopedics;  Laterality: Left; 
   HYSTERECTOMY  1998 
  complete 
   PACEMAKER INSERTION   
  [employer] pacemaker/defib 
   PERITONEAL CATHETER INSERTION  8/15/2013 
   PERITONEAL CATHETER INSERTION N/A 2016 
  Procedure: LAPAROSCOPIC - PERITONEAL DIALYSIS CATH INSERTION;  Surgeon: Kirt Mclaughlin MD;  Lo
cation: [facility] MAIN OR;  Service: Vascular;  Laterality: N/A;  Removal of old catheter 
   SHOULDER SURGERY Right  
  frozen shoulder 
 
   UNLISTED PROCEDURE ARTHROSCOPY   
  total Left hip 
   vascular stents Left 2017 
  leg (2 stents) 
   VASCULAR SURGERY   
 
 Allergies 
 Allergen Reactions 
   Quinapril Hcl Anaphylaxis 
   Digoxin And Related Other (See Comments) 
   toxic 
   Metaxalone Other (See Comments) 
   Morphine Mental Changes 
   Make her crazy/ "funny feeling in my head"
 Has used hydromorphone in-house 
   Pantoprazole Sodium Nausea and Vomiting 
   Penicillins Rash 
   Quinapril Hcl Edema 
   Facial Edema 
   Sudafed [Pseudoephedrine Hcl] Rash 
 
 No prescriptions prior to admission. 
 
 DISCHARGE EXAM
 Vital Signs:
 /56 (BP Location: Right upper arm)  | Pulse 68  | Temp 97.7 F (36.5 C) (Axillary)
  | Resp 20  | Ht 1.778 m (5' 10")  | Wt 65.5 kg (144 lb 6.4 oz)  | SpO2 92%  | BMI 20.72 kg
/m 
 General appearance: alert, appears stated age, cooperative, fatigued and no distress
 Head: Normocephalic, without obvious abnormality, atraumatic
 Neck: no adenopathy, no carotid bruit, no JVD, supple, symmetrical, trachea midline and thy
roid not enlarged, symmetric, no tenderness/mass/nodules
 Lungs: clear to auscultation bilaterally
 Heart: regular rate and rhythm, S1, S2 normal, no murmur, click, rub or gallop
 Abdomen: soft, non-tender; bowel sounds normal; no masses,  no organomegaly
 Extremities: left leg boot 
 Pulses: 2+ and symmetric
 Neurologic: Grossly normal AXO3 non focal 
 
 DATA
 
 CBC:  
 Lab Results 
 Component Value Date 
  WBC 4.08 2019 
  RBC 2.83 (L) 2019 
  HGB 9.7 (L) 2019 
  HCT 29.7 (L) 2019 
  .0 (H) 2019 
  MCH 34.2 (H) 2019 
  MCHC 32.5 2019 
  RDW 64.3 (H) 2019 
   (L) 2019 
  MPV 9.5 2019 
  DIFFTYPE MANUAL 2019 
 
 CMP:  
 Lab Results 
 Component Value Date 
   2019 
 
  K 4.4 2019 
   2019 
  CO2 28 2019 
  ANIONGAP 14 2019 
  GLUF 153 (H) 2019 
  BUN 15 2019 
  CREATININE 4.9 (H) 2019 
  BCR 3 2019 
  CA 9.6 2019 
  CA 8.7 2016 
  PROT 6.1 (L) 2019 
  ALB 2.6 (L) 2019 
  GLOB 3.5 2019 
  BILITOT 0.5 2019 
  ALP 85 2019 
  AST 22 2019 
  ALT 22 2019 
  EGFR 9 (L) 2019 
 
 Lab Results 
 Component Value Date 
  CKTOTAL 22 (L) 2019 
  CKMB 1.8 2019 
  CKMBINDEX 8.2 2019 
  TROPONINI 0.061 2019 
 
 Disposition:  Home
 Condition:  Stable
 Code Status:  Prior
 
 Discharge Instructions
 Diet Renal 
 
 Diet Low Sodium 
 
 Activity as Advised by Physical Therapy 
 
 Call MD for: Temperature > 100.4F (38C) 
 
 Call MD for:  Persistant Nausea and Vomiting 
 
 Call MD for:  Severe Uncontrolled Pain 
 
 Call MD for:  Redness, Tenderness, or Signs of Infection (Pain, Swelling, Redness, Odor or 
Green/Yellow Discharge Around Incision Site) 
 
 Call MD for:  Hives 
 
 Call MD for:  Difficulty Breathing, Headache or Visual Disturbances 
 
 Call MD for:  Persistant Dizziness or Light-Headedness 
 
 Call MD for: Extreme Fatigue 
 
 Follow up:
 Ivy Couch, DO
 [address], [address]
 [facility] [id number] [phone]
 
 
 Schedule an appointment as soon as possible for a visit in 1 week
 
 Andrés Elliott MD
 [address] [facility] [id number] [phone]
 
 Schedule an appointment as soon as possible for a visit in 1 week
 
 Srinivasan Jordan DPM
 [address], [address]
 [facility] [id number] [phone]
 
 Schedule an appointment as soon as possible for a visit in 2 weeks
 
 Celestino Porras MD
 [address]  [facility] [address]
 [facility] [id number] [phone]
 
 As needed
 
  
 Medication List 
  
 CHANGE how you take these medications  
 carvedilol 12.5 MG tablet
 QTY:  60 tablet
 Refills:  0
 Doctor's comments:  Hold for SBP less than 100
 Hold for HR less than 60
 Commonly known as:  COREG
 Take 1 tablet by mouth 2 (two) times daily with meals.
 What changed:
  medication strength
  how much to take
  
 LORazepam 1 MG tablet
 QTY:  30 tablet
 Refills:  0
 Commonly known as:  ATIVAN
 Take 1 tablet by mouth every 8 (eight) hours as needed for Anxiety.
 What changed:  when to take this
  
 losartan 25 MG tablet
 QTY:  30 tablet
 Refills:  0
 Commonly known as:  COZAAR
 Take 1 tablet by mouth daily.
 What changed:  how much to take
  
  
 CONTINUE taking these medications  
 * albuterol 108 (90 Base) MCG/ACT inhaler
 Refills:  0
 Commonly known as:  PROVENTIL HFA;VENTOLIN HFA
  
 * VENTOLIN  (90 Base) MCG/ACT inhaler
 Refills:  0
 
 Generic drug:  albuterol
  
 apixaban 2.5 MG tablet
 QTY:  60 tablet
 Refills:  0
 Commonly known as:  ELIQUIS
 Take 1 tablet by mouth 2 (two) times daily.
  
 aspirin 81 MG EC tablet
 QTY:  30 tablet
 Refills:  0
 Take 1 tablet by mouth daily with breakfast.
  
 atorvastatin 40 MG tablet
 QTY:  30 tablet
 Refills:  0
 Commonly known as:  LIPITOR
 Take 1 tablet by mouth nightly.
  
 calcium acetate 667 MG capsule
 Refills:  0
 Commonly known as:  PHOSLO
  
 clopidogrel 75 MG tablet
 QTY:  30 tablet
 Refills:  11
 Commonly known as:  PLAVIX
 Take 1 tablet by mouth daily.
  
 esomeprazole 20 MG capsule
 Refills:  0
 Commonly known as:  NEXIUM
  
 HYDROcodone-acetaminophen 5-325 MG per tablet
 QTY:  30 tablet
 Refills:  0
 Commonly known as:  NORCO
 Take 1 tablet by mouth every 4 (four) hours as needed.
  
 insulin aspart 100 UNIT/ML injection
 Refills:  0
 Commonly known as:  NOVOLOG
  
 insulin degludec 100 UNIT/ML injection
 Refills:  0
 Commonly known as:  TRESIBA
  
 isosorbide mononitrate 60 MG 24 hr tablet
 QTY:  30 tablet
 Refills:  1
 Commonly known as:  IMDUR
 Take 1 tablet by mouth daily.
  
 loperamide 2 MG capsule
 Refills:  0
 Commonly known as:  IMODIUM
  
 nitroGLYCERIN 0.4 MG SL tablet
 QTY:  30 tablet
 Refills:  0
 
 Doctor's comments:  Hold for SBP less than 100
 Commonly known as:  NITROSTAT
 Place 1 tablet under the tongue every 5 (five) minutes as needed for Chest pain.
  
 nortriptyline 25 MG capsule
 Refills:  0
 Commonly known as:  PAMELOR
  
 ondansetron 4 MG disintegrating tablet
 Refills:  0
 Commonly known as:  ZOFRAN-ODT
  
 oxybutynin 5 MG tablet
 Refills:  0
 Commonly known as:  DITROPAN
  
 prochlorperazine 5 MG tablet
 Refills:  0
  
 ranitidine 150 MG tablet
 Refills:  0
 Commonly known as:  ZANTAC
  
 rOPINIRole 0.25 MG tablet
 Refills:  0
 Commonly known as:  REQUIP
  
 SLOW-MAG 71.5-119 MG Tbec
 Refills:  0
 Generic drug:  Magnesium Cl-Calcium Carbonate
  
 Vitamin D3 5000 units Caps
 Refills:  0
  
 Vortioxetine HBr 10 MG Tabs
 Refills:  0
  
  
 * This list has 2 medication(s) that are the same as other medications prescribed for you. 
Read the directions carefully, and ask your doctor or other care provider to review them wit
h you. 
  
  
  
 You might also be taking other medications not listed above. If you have questions about an
y of your other medications, talk to the person who prescribed them or your Primary Care Pro
vider. 
  
  
  
 STOP taking these medications  
 CefTAZidime and Dextrose 2-5 GM-%(50ML) Solr
  
 furosemide 20 MG tablet
 Commonly known as:  LASIX
  
 vancomycin 1000 mg injection
 Commonly known as:  VANCOCIN
  
  
 
  
 Where to Get Your Medications 
  
 You can get these medications from any pharmacy  
 Bring a paper prescription for each of these medications
  carvedilol 12.5 MG tablet
  LORazepam 1 MG tablet
  nitroGLYCERIN 0.4 MG SL tablet
  
 
 Discharge took over 30  minutes, to include final examination, discussion of admission, and
 preparation of prescriptions, instructions for on-going care, follow-up and documentation o
f discharge summary.
 
 Silas Ferrara MD
 2019in this encounter
 
 Discharge Instructions
 Julia Riley, RN - 2019
 Stable Angina
 The chest discomfort you have appears to be coming from your heart  a condition called ang
micaela. Angina is a pain in the heart due to poor blood flow to the heart muscle. This can occu
r when plaque builds up on the artery wall or a blood clot forms in one or more of the small
 blood vessels that deliver oxygen to the heart muscle. Plaque is a fatty material made up o
f cholesterol, calcium deposits, and other materials.
 Exercise, increased activity, emotional upset, or stress can trigger this pain. With proper
 treatment and lifestyle changes to reduce risk factors, most people with angina are able to
 maintain a full and active life.
 Angina is not a heart attack. But if angina pain is severe or prolonged, it is a sign ofa
n impending heart attack, also called acute myocardial infarction, or AMI. Your angina is un
elizabeth control at this time. Therefore, it is safe for you to go home. Follow up as instructed 
by your doctor for any further tests and office visits.
 
 Home care
  Rest at home today and avoid any emotional or physically strenuous activity.
  Take medicine (usually nitroglycerin) for chest pain exactly as prescribed. Keep your ni
troglycerin with you at all times.
  When taking nitroglycerin for angina, sit or lie down. The medicine may make you feel di
zzy.
  Place one tablet under your tongue, or between your lip and gum, or between your cheek a
nd gum. Let the tablet dissolve completely; do not chew or swallow the tablet.
  If you use a spray, then spray once on orunder your tongue. Do not inhale. Right after
 you use the spray, close your mouth. Wait a few seconds before you swallow.
  After taking one tablet or spraying once, continue sitting or lying for 5 minutes.
  If the angina goes away completely, rest awhile and continue your normal routine.
 Note: Your healthcare provider may give you slightly different instructions than those claudine fernandez. If so, follow them carefully.
 Prevention
  Learn how to take your own blood pressure. Keep a record of your results. Ask your docto
r which readings mean that you need medical attention. Reduce salt intake and follow lifesty
le change instructions if you have high blood pressure (hypertension).
  Maintain a healthy weight. Get help to lose any extra pounds. Talk to your doctor about 
a safe diet program. Sudden large weight losses can be dangerous.
  If you have diabetes, talk to your doctor about healthy control of your blood sugar.
  Begin an exercise program. Ask your doctor how to get started. You can benefit from simp
le activities such as walking or gardening. Short, high intensity exercise sessions may also
 be beneficial.
  Break the smoking habit. Enroll in a stop-smoking program to improve your chances of suc
cess.
  Get adequate rest.
 
  Stay away from stressful situations. Learn stress-management techniques.
 Follow-up care
 Followup with your doctor, or as advised.
 If an X-ray wasdone , it will be reviewed by another specialist. You will be notified of 
any new findings that may affect your care.
 Call 911
 This is the fastest and safest way to get to the nearest emergency department. The paramedi
cs can also start treatment on the way to the hospital, saving valuable time for your heart.
  Ifangina gets worse, it continues, or if it stops and returns, call 911 right away. Do
n't delay. You may be having a heart attack.
  After you call 911, take a second nitroglycerine tablet or spray unless instructed other
wise. When repeating doses, sit down if possible because it can make you feel lightheaded or
 dizzy. Wait another 5 minutes. If the angina still does not go away, take a third tablet or
 spray. Don't take more than 3 tablets or sprays within 15 minutes. Stay on the phone with 9
11 for more instructions.
  Your healthcare provider may give you slightly different instructions than those above. 
If so, follow them carefully.
 Don'twait until your symptoms are severe to call 911. Other reasons to call 911 besides c
hest pain include:
  Trouble breathing
  Feeling lightheaded, faint, or dizzy
  Rapid heart beat
  Slower than usual heart rate compared to your normal
  Angina with weakness, dizziness, fainting, heavy sweating, nausea, or vomiting
  Extreme drowsiness, or confusion
  Weakness of an arm or leg or on one side of the face
  Difficulty with speech or vision
 When to seek medical advice
 Remember, the signs and symptoms of a heart attack are not always like they are on TV. Some
times they are not so obvious. You may only feel weak or just "not right." If it is not charly
r or if you have any doubt, call for advice.
  Seek help if there is a change in the type of pain, if it feels different, or if your sy
mptoms are mild.
  Don't drive yourself. Have someone else drive. If no one can drive you, call 911.
  If your doctor has given you medicine to take when you have symptoms, take them, but do 
not delay getting help.
  Don't delay. Fast diagnosis and treatment can prevent or limit the amount of heart dam
age during a heart attack or stroke.
  Don't go to your doctor's office or a clinic because they may not be able to provide all
 the testing and treatment you need.
 Date Last Reviewed: 2015-2018 [employer]. [address] [place] [address], [place] [address]. All righ
ts reserved. This information is not intended as a substitute for professional medical care.
 Always follow your healthcare professional's instructions.
 
 in this encounter
 
 Medications at Time of Discharge
 
 
+----------------------+----------------------+--------+---------+----------+-----------+
| Medication           | Sig.                 | Disp.  | Refills | Start    | End Date  |
|                      |                      |        |         | Date     |           |
+----------------------+----------------------+--------+---------+----------+-----------+
|   albuterol          | Inhale 2 puffs into  |        |         |          |           |
| (PROVENTIL           | the lungs every 4    |        |         |          |           |
| HFA;VENTOLIN HFA)    | (four) hours as      |        |         |          |           |
| 108 (90 Base)        | needed for Wheezing. |        |         |          |           |
| MCG/ACT              |                      |        |         |          |           |
| inhalerIndications:  |                      |        |         |          |           |
 
| states uses 2x       |                      |        |         |          |           |
| monthly average      |                      |        |         |          |           |
+----------------------+----------------------+--------+---------+----------+-----------+
|   esomeprazole       | Take 1 capsule by    |        |         |          |           |
| (NEXIUM) 40 MG       | mouth every day      |        |         |          |           |
| capsule              |                      |        |         |          |           |
+----------------------+----------------------+--------+---------+----------+-----------+
|   insulin aspart     | Inject  into the     |        |         |          |           |
| (NOVOLOG) 100        | skin 3 (three) times |        |         |          |           |
| UNIT/ML injection    |  daily before meals. |        |         |          |           |
|                      |  Sliding scale       |        |         |          |           |
+----------------------+----------------------+--------+---------+----------+-----------+
|   isosorbide         | Take 1 tablet by     |   30   | 1       | 20 |  |
| mononitrate (IMDUR)  | mouth daily.         | tablet |         | 18       | 9         |
| 60 MG 24 hr tablet   |                      |        |         |          |           |
+----------------------+----------------------+--------+---------+----------+-----------+
|   nortriptyline      | Take 25-75 mg by     |        |         |          |           |
| (PAMELOR) 25 MG      | mouth See Admin      |        |         |          |           |
| capsule              | Instructions. Takes  |        |         |          |           |
|                      | 25 mg by mouth every |        |         |          |           |
|                      |  morning and 75 mg   |        |         |          |           |
|                      | every night          |        |         |          |           |
+----------------------+----------------------+--------+---------+----------+-----------+
|   ondansetron        | Take 4 mg by mouth   |        |         | 20 |           |
| (ZOFRAN-ODT) 4 MG    | every 8 (eight)      |        |         | 18       |           |
| disintegrating       | hours as needed.     |        |         |          |           |
| tablet               |                      |        |         |          |           |
+----------------------+----------------------+--------+---------+----------+-----------+
|   oxybutynin         | Take 7.5 mg by mouth |        |         |          |           |
| (DITROPAN) 5 MG      |  2 (two) times       |        |         |          |           |
| tablet               | daily.               |        |         |          |           |
+----------------------+----------------------+--------+---------+----------+-----------+
|   rOPINIRole         | Take 0.75 mg by      |        |         |          |           |
| (REQUIP) 0.25 MG     | mouth nightly.       |        |         |          |           |
| tablet               |                      |        |         |          |           |
+----------------------+----------------------+--------+---------+----------+-----------+
|   apixaban (ELIQUIS) | Take 1 tablet by     |   60   | 0       | 20 |           |
|  2.5 MG tablet       | mouth 2 (two) times  | tablet |         | 18       |           |
|                      | daily.               |        |         |          |           |
+----------------------+----------------------+--------+---------+----------+-----------+
|   carvedilol (COREG) | Take 1 tablet by     |   60   | 0       | 20 |  |
|  12.5 MG tablet      | mouth 2 (two) times  | tablet |         | 19       | 0         |
|                      | daily with meals.    |        |         |          |           |
+----------------------+----------------------+--------+---------+----------+-----------+
|                      | Take 1 tablet by     |   30   | 0       | 20 |           |
| HYDROcodone-acetamin | mouth every 4 (four) | tablet |         | 19       |           |
| ophen (NORCO) 5-325  |  hours as needed.    |        |         |          |           |
| MG per tablet        |                      |        |         |          |           |
+----------------------+----------------------+--------+---------+----------+-----------+
|   insulin degludec   | Inject 21 units      |        |         |          |           |
| (TRESIBA) 100        | every night          |        |         |          |           |
| UNIT/ML injection    |                      |        |         |          |           |
+----------------------+----------------------+--------+---------+----------+-----------+
|   nitroGLYCERIN      | Place 1 tablet under |   30   | 0       | 20 |  |
| (NITROSTAT) 0.4 MG   |  the tongue every 5  | tablet |         | 19       | 0         |
| SL tablet            | (five) minutes as    |        |         |          |           |
|                      | needed for Chest     |        |         |          |           |
|                      | pain.                |        |         |          |           |
+----------------------+----------------------+--------+---------+----------+-----------+
|   calcium acetate    | 667 mg 3 (three)     |        |         | 20 |  |
 
| (PHOSLO) 667 MG      | times daily with     |        |         | 16       | 9         |
| capsule              | meals.               |        |         |          |           |
+----------------------+----------------------+--------+---------+----------+-----------+
|   loperamide         | 2 mg as needed.      |        |         |          |  |
| (IMODIUM) 2 MG       |                      |        |         |          | 9         |
| capsule              |                      |        |         |          |           |
+----------------------+----------------------+--------+---------+----------+-----------+
|   prochlorperazine   | Take 5 mg by mouth 2 |        |         |          |  |
| (COMPAZINE) 5 MG     |  (two) times daily   |        |         |          | 9         |
| tabletIndications:   | as needed for        |        |         |          |           |
| Severe Nausea and    | Nausea. Indications: |        |         |          |           |
| Vomiting             |  Severe Nausea and   |        |         |          |           |
|                      | Vomiting             |        |         |          |           |
+----------------------+----------------------+--------+---------+----------+-----------+
|   Vortioxetine HBr   | 10 mg nightly.       |        |         |          |  |
| 10 MG TABS           |                      |        |         |          | 9         |
+----------------------+----------------------+--------+---------+----------+-----------+
|   albuterol          | Ventolin HFA 90      |        |         |          |  |
| (VENTOLIN HFA) 108   | mcg/actuation        |        |         |          | 9         |
| (90 Base) MCG/ACT    | aerosol inhaler      |        |         |          |           |
| inhaler              | Inhale 2 puffs every |        |         |          |           |
|                      |  4 hours by          |        |         |          |           |
|                      | inhalation route as  |        |         |          |           |
|                      | needed.              |        |         |          |           |
+----------------------+----------------------+--------+---------+----------+-----------+
|   aspirin 81 MG EC   | Take 1 tablet by     |   30   | 0       | 07/10/20 |  |
| tablet               | mouth daily with     | tablet |         | 17       | 9         |
|                      | breakfast.           |        |         |          |           |
+----------------------+----------------------+--------+---------+----------+-----------+
|   atorvastatin       | Take 1 tablet by     |   30   | 0       | 20 |  |
| (LIPITOR) 40 MG      | mouth nightly.       | tablet |         | 19       | 9         |
| tablet               |                      |        |         |          |           |
+----------------------+----------------------+--------+---------+----------+-----------+
|   Cholecalciferol    | Take 5,000 capsules  |        |         |          |  |
| (VITAMIN D3) 5000    | by mouth every other |        |         |          | 9         |
| UNITS CAPS           |  day.                |        |         |          |           |
+----------------------+----------------------+--------+---------+----------+-----------+
|   clopidogrel        | Take 1 tablet by     |   30   | 11      | 20 |  |
| (PLAVIX) 75 MG       | mouth daily.         | tablet |         | 18       | 9         |
| tablet               |                      |        |         |          |           |
+----------------------+----------------------+--------+---------+----------+-----------+
|   LORazepam (ATIVAN) | Take 1 tablet by     |   30   | 0       | 20 | 02/15/201 |
|  1 MG tablet         | mouth every 8        | tablet |         | 19       | 9         |
|                      | (eight) hours as     |        |         |          |           |
|                      | needed for Anxiety.  |        |         |          |           |
+----------------------+----------------------+--------+---------+----------+-----------+
|   Magnesium          | Take 1 tablet by     |        |         |          |  |
| Cl-Calcium Carbonate | mouth every other    |        |         |          | 9         |
|  (SLOW-MAG) 71.5-119 | day.                 |        |         |          |           |
|  MG TBEC             |                      |        |         |          |           |
+----------------------+----------------------+--------+---------+----------+-----------+
|   ranitidine         | ranitidine 150 mg    |        |         |          |  |
| (ZANTAC) 150 MG      | tablet Take 1 tablet |        |         |          | 9         |
| tablet               |  twice a day by oral |        |         |          |           |
|                      |  route.              |        |         |          |           |
+----------------------+----------------------+--------+---------+----------+-----------+
 as of this encounter
 
 Progress Notes
 Ethan Awad MD - 2019 12:31 PM PSTFormatting of this note may be different from t
 
adrianna original.
 4460/4460-1  
    LOS: 0 days 
 She is followed for ESRD management.
 She was admitted with chest pain and concern with unstable angina/NSTEMI.
 I assumed nephrology care from Dr. Nguyen 19.
 She feels OK today and is happy at being discharged today.
 She had uncomplicated HD yesterday and has no chest pain today.
 She is ambulating with minimal weight bearing left foot.
 She has no chest pain.
 
 PMH, PSH, Social history reviewed in chart. Allergies and medications reviewed. Previous hi
story summarized as above. Prior lab data and imaging reviewed.Patient's old records and lab
s were reviewed in detail and summarized.
 
 ROS:
 Review of systems is as per history of present illness. Otherwise a 14 organ system review 
of systems was done and is negative.
 
 Examination:
 
 APPEARANCE: She is is alert, awake, sitting up in chair in no distress. 
 VITALS: Reviewed as listed.
 HEAD: NC/AT. 
 EYES: EOMI. Non-icteric sclera.
 NECK: No JVD. 
 LUNGS: Effort fair. CTA. 
 HEART: S1 soft, no pericardial rub noted. Systolic murmur at apex with radiation to back.
 ABDOMEN: Soft with minimal distention, no tenderness. No organomegaly or bruits noted. Benoit
l sounds diminished.
 EXTREMITIES: No LE edema. No cyanosis or clubbing. Peripheral pulses not palpable. Left jeanne
t s/p TMA and in a boot.
 SKIN: Warm to touch. No rash.
 NEUROLOGIC: Alert, awake, oriented, speech fluent. Gait not tested. 
 PSYCH: pleasant demeanor..
 BACK: No CVA tenderness noted. There is no sacral edema.
 
 Dialysis access
 Left brachiocephalic AV fistula with no evidence of malfunction or inflammation
 
 vital Signs:
 /56 (BP Location: Right upper arm)  | Pulse 68  | Temp 97.7 F (36.5 C) (Axillary)
  | Resp 20  | Ht 1.778 m (5' 10")  | Wt 65.5 kg (144 lb 6.4 oz)  | SpO2 92%  | BMI 20.72 kg
/m 
 
 Data evaluation:
 
 Lab Results 
 Component Value Date 
  BUN 15 2019 
  CREATININE 4.9 (H) 2019 
  EGFR 9 (L) 2019 
   2019 
  K 4.4 2019 
   2019 
  CO2 28 2019 
  CA 9.6 2019 
  PHOS 4.4 2019 
  MG 2.2 2018 
  ALB 2.6 (L) 2019 
 
  HGB 9.7 (L) 2019 
 
 Lab Results 
 Component Value Date 
  HGB 9.7 (L) 2019 
  HGB 8.5 (L) 2019 
  HGB 8.7 (L) 2019 
  FERRITIN 722 (H) 2017 
  FERRITIN 793 (H) 2016 
  FERRITIN 883 (H) 2016 
  LABIRON 28 2017 
  LABIRON 17 2016 
  LABIRON 31 2016 
 
 Lab Results 
 Component Value Date 
  ANIONGAP 14 2019 
 
 Last 3 Troponin:  
 Lab Results 
 Component Value Date 
  TROPONINI 0.061 2019 
  TROPONINI 0.075 2019 
  TROPONINI 0.256 (H) 2019 
 
  Lower Extremity Arterial Left 2018 showed Greater than 50% stenosis of the mid sup
erficial femoral artery. 2.  Monophasic single runoff to the ankle via the anterior tibial a
rtery.
 
 X-ray Foot Left 2018 showed amputation of the left forefoot at the level of the proxi
mal metatarsals. No radiographic evidence for osteomyelitis. Normal alignment of the hindfoo
t. Mild degeneration of the midfoot. 
 
 EKG 19 showed Normal sinus rhythm. Non-specific intra-ventricular conduction delay. No
nspecific ST abnormality. Poor R - progression 
 
 Assessment and Recommendations:
 
 1. ESRD on HD
 2. Anemia chronic renal failure
 3. Chronic systolic CHF s/p ICD
 4. Chest pain with concern for angina
 5. Anemia chronic renal failure
 6. Chronic anticoagulation
 7. PAD s/p left TMA
 8. Paroxysmal Afib
 9. CAD s/p CABG
 
 She is hemodynamically stable with no fever/tachycardia/bradycardia/hypotension or severe h
ypertension/hypoxia at rest.
 She is euvolemic.
 BP is OK and she remains in SR on EKG. She remains on BB/ARB.
 It doesn't look like she had an AMI.
 She remains on Clopidogrel/apixaban/atorvastatin/long acting nitrate. She has been seen by 
Dr. Porras whose evaluation is noted.
 Gastritis was a consideration. She remains on PPI.
 
  From my side she can be discharged and resume outpatient HD TTS.
  Continue follow up with cardiology for optimal medical management.
 
 
 She should be on a 2 gm Na, 2 gm K, 1 gm PO4, 1 gm/kg protein diet.
 Please dose all medications for an eGFR of less than 15 ml/min/1.73 m2.
 NSAIDS/HOPPER 2 inhibitors should be avoided. Aluminum/magnesium containing antacids and magne
sium/phosphate containing enemas should be avoided.
 Fluid should be restricted to less than 1.5 L in a 24 hr period.
 Strict I/O should be done.
 Plan of care was discussed with Dr. Ferrara.
 
 ETHAN AWAD MD
 2019
 
 Portions of my previous notes have been carried over for continuity of care.
 She was seen earlier in the day and charting was completed later after rounds.
 Dictation software, Dragon, was used which may contain error for similar sounding words. Pe
rsonal communication is requested for any clarification.
 Prognosis is guarded in view of multiple comorbid illnesses and ESRD including but not limi
ashly to death.
 
   apixaban  2.5 mg Oral BID 
   atorvastatin  40 mg Oral Nightly 
   calcium acetate  667 mg Oral TID WC 
   calcium carbonate  1,000 mg Oral Q48H 
  And 
   magnesium chloride  1 tablet Oral Q48H 
   carvedilol  12.5 mg Oral BID WC 
   cholecalciferol  1,000 Units Oral Daily 
   clopidogrel  75 mg Oral Daily 
   insulin detemir  10 Units Subcutaneous Nightly 
   insulin lispro (human)  0-3 Units Subcutaneous Nightly 
   insulin lispro (human)  0-6 Units Subcutaneous TID AC 
   isosorbide mononitrate  60 mg Oral Daily 
   losartan  25 mg Oral Daily 
   nortriptyline  25 mg Oral QAM 
   nortriptyline  75 mg Oral Nightly 
   oxybutynin  2.5 mg Oral BID 
   pantoprazole  40 mg Oral QAM AC 
   rOPINIRole  0.75 mg Oral Nightly 
 
   dextrose   
 
 Celestino Porras MD - 2019  7:14 AM PSTFormatting of this note may be different fro
m the original.
 [facility]
 Service:  Cardiology
 Progress Note
 
 Name of Consultant: Celestino Porras MD
 I have seen the patient on 2019 
 SUBJECTIVE
 
 Current Facility-Administered Medications: 
    acetaminophen (TYLENOL) tablet 650 mg, 650 mg, Oral, Q6H PRN **OR** acetaminophen (TYL
ENOL) suppository 650 mg, 650 mg, Rectal, Q6H PRN, Negro Lr DO
    albuterol (PROVENTIL HFA;VENTOLIN HFA) inhaler, 2 puff, Inhalation, Q4H PRN, Negro alexander DO
    apixaban (ELIQUIS) tablet 2.5 mg, 2.5 mg, Oral, BID, Negro Lr DO, 2.5 mg at  2153
    atorvastatin (LIPITOR) tablet 40 mg, 40 mg, Oral, Nightly, Negro Lr DO, 40 mg at 
19 2153
    calcium acetate (PHOSLO) capsule 667 mg, 667 mg, Oral, TID WC, Negro Lr DO, 667 m
 
g at 19 1618
    calcium carbonate (TUMS) chewable tablet 1,000 mg, 1,000 mg, Oral, Q48H, 1,000 mg at 0
19 1147 **AND** magnesium chloride (MAG64) EC tablet 64 mg, 1 tablet, Oral, Q48H, Silas Ferrara MD, 64 mg at 19 1147
    carvedilol (COREG) tablet 12.5 mg, 12.5 mg, Oral, BID , Celestino Porras MD, 12.5 m
g at 19 1618
    cholecalciferol (VITAMIN D-3) tablet 1,000 Units, 1,000 Units, Oral, Daily, Negro drummond DO, 1,000 Units at 19 1148
    clopidogrel (PLAVIX) tablet 75 mg, 75 mg, Oral, Daily, Negro Lr DO, 75 mg at  1147
    dextrose 10 % infusion, , Intravenous, Continuous PRN, Negro Lr DO
    dextrose 50 % solution 12 mL, 12 mL, Intravenous, PRN, Negro Lr DO
    dextrose 50 % solution 25 mL, 25 mL, Intravenous, PRN, Negro Lr DO
    glucagon (GLUCAGEN) injection 0.5 mg, 0.5 mg, Intramuscular, PRN, Negro Lr DO
    glucagon (GLUCAGEN) injection 1 mg, 1 mg, Intramuscular, PRN, Negro Lr DO
    HYDROcodone-acetaminophen (NORCO) 5-325 MG per tablet 1 tablet, 1 tablet, Oral, Q4H TN
N, Negro Lr DO, 1 tablet at 19 0542
    insulin detemir (LEVEMIR) injection 10 Units, 10 Units, Subcutaneous, Nightly, Negro Lr DO, 10 Units at 19 2154
    insulin lispro (human) (HUMALOG) injection 0-3 Units, 0-3 Units, Subcutaneous, Nightly
, Negro Lr DO, 1 Units at 19 2243
    insulin lispro (human) (HUMALOG) injection 0-6 Units, 0-6 Units, Subcutaneous, TID AC,
 Negro Lr DO, 1 Units at 19 0538
    isosorbide mononitrate (IMDUR) 24 hr tablet 60 mg, 60 mg, Oral, Daily, STEVEN Malcolm
O, 60 mg at 19 0734
    loperamide (IMODIUM) capsule 2 mg, 2 mg, Oral, PRN, Negro Lr DO
    LORazepam (ATIVAN) tablet 1 mg, 1 mg, Oral, Q6H PRN, Silas Ferrara MD, 1 mg at 19 
1634
    losartan (COZAAR) tablet 25 mg, 25 mg, Oral, Daily, Negro Lr DO, 25 mg at 
9 1148
    nitroGLYCERIN (NITROSTAT) SL tablet 0.4 mg, 0.4 mg, Sublingual, Q5 Min PRN, Silas Ferrara MD, 0.4 mg at 19 0539
    nortriptyline (PAMELOR) capsule 25 mg, 25 mg, Oral, QAM, Silas Ferrara MD
    nortriptyline (PAMELOR) capsule 75 mg, 75 mg, Oral, Nightly, Silas Ferrara MD, 75 mg at 
19 2153
    ondansetron (ZOFRAN-ODT) disintegrating tablet 4 mg, 4 mg, Oral, Q6H PRN **OR** ondans
etron (ZOFRAN) injection 4 mg, 4 mg, Intravenous, Q6H PRN, Negro Lr DO
    oxybutynin (DITROPAN) tablet 2.5 mg, 2.5 mg, Oral, BID, Negro Lr DO, 2.5 mg at 2154
    pantoprazole (PROTONIX) EC tablet 40 mg, 40 mg, Oral, QAM AC, Negro Lr DO, 40 mg 
at 19 0542
    polyethylene glycol (GLYCOLAX) packet 17 g, 17 g, Oral, Daily PRN, Negro Lr DO
    prochlorperazine tablet 5 mg, 5 mg, Oral, BID PRN, Negro Lr DO
    rOPINIRole (REQUIP) tablet 0.75 mg, 0.75 mg, Oral, Nightly, Negro Lr DO, 0.75 mg 
at 19
 
 OBJECTIVE
 Vital Signs:
 /60 (BP Location: Right upper arm)  | Pulse 77  | Temp 98.3 F (36.8 C) (Oral)  | 
Resp 18  | Ht 1.778 m (5' 10")  | Wt 65.5 kg (144 lb 6.4 oz)  | SpO2 98%  | BMI 20.72 kg/m
 
 
 Intake/Output Summary (Last 24 hours) at 19 0714
 Last data filed at 19 2252
  Gross per 24 hour 
 Intake             2473 ml 
 Output             3500 ml 
 Net            -1027 ml 
 
 Cardiovascular: Regular rhythm, S1 normal and S2 normal.  
 
 Systolic murmur in the left sternal border. 
 Pulmonary/Chest: Effort normal and breath sounds normal. No wheezes. No rales. 
 Abdominal: Soft. No tenderness. 
 Musculoskeletal: No edema. 
 Neurological: Alert. No cranial nerve deficit. 
 Skin: Warm and dry. 
 
 DATA
 
 Recent Labs
 Lab 19
 0554 19
 0847 
  139 
 K 4.4 4.4 
 CO2 28 32 
 BUN 15 24 
 CREATININE 4.9* 6.5* 
 EGFR 9* 6* 
 
 Recent Labs
 Lab 19
 0554 19
 0847 
 WBC 4.08 4.23 
 HGB 9.7* 8.5* 
 HCT 29.7* 26.0* 
 .0* 103.8* 
 * 134* 
 
 Recent Labs
 Lab 19
 1747 19
 1411 
 TROPONINI 0.061 0.075 
 
 Lab Results 
 Component Value Date 
  CHOL 101 2017 
  TRIG 132 2017 
  LDL 41 2017 
  HDL 34 (L) 2017 
  GLUF 153 (H) 2019 
  HGBA1C 7.5 (H) 2018 
  TSH 1.14 2017 
 
 EK2019
 
 Last Echo: 2018
 Reported with normal LV size, EF 20-25%. Mild AI. Mild AS. Mild MR. Moderate TR. Moderate p
ulmonary HTN RVSP 55.9.
 Last stress test: 2018
 Reported with no evidence of ischemia. 
 Last cath: 2018 shows three-vessel coronary artery disease with widely patent stent in
 the left anterior descending artery and severe stent restenosis in the marginal branch, occ
luded right coronary artery with collateral from left to right.
 Carotid US: 
 AAA screening: 
 Lower extremity US: IR angioplasty 2018
 Angioplasty of the left common iliac artery was then performed using 6 x 40 mm angioplasty 
 
balloon.
 Drug eluting balloon angioplasty of the left SFA was then performed using 4 x 100 mm Lutoni
x balloon.
 OTHERS: on 2018
 STATUS POST Mitral valve repair with a 28 Cristopher annuloplasty band, CABG x 2 with grafts 
to OM and RCA; endoscopic vein harvest (left greater saphenous vein) 
 2018
 S/P insertion of [employer] ICD.
 
 ASSESSMENT & PLAN
 Patient is 68 y.o. with
 1. Coronary artery disease with previous CABG X 2.
 2. Severely impaired systolic function.
 3. Ischemic and non ischemic cardiomyopathy, NYH class III, stage C.
 4. End stage renal disease on HD.
 5. Peripheral vascular disease.
 6. Osteomyelitis.
 7. Anemia due to chronic disease. 
 8. S/P mitral valve repair.
 9. Essential hypertension. 
 10. Insulin dependent diabetes mellitus.
 11. Post Op atrial fibrillation. 
 12. Hyperlipidemia. 
 
 Recommendations:
 Complex past medical history and presentation.
 Patient is chest pain free at this time, no chest pain after isosorbide. 
 Continue with carvedilol 12.5 mg bid..
 Continue with isosorbide mononitrate 60 mg. 
 Needs optimization of Cardiomyopathy treatment. 
 Consider increasing losartan if hemodynamics allow. 
 Furosemide was stopped. 
 Continue with Apixaban and Clopidogrel.
 Aspirin was stopped, anemia and high risk patient on triple therapy.
 Further recommendations will follow. 
 
 Code Status:  Full Code
 
 Celestino Porras MD
 2019 Silas Ferrara MD - 2019  8:15 AM PSTFormatting of this note may be different
 from the original.
 [facility]  
 Service:  Hospitalist
 Progress Note
 
 Hospital Day:   LOS: 0 days 
 Post-Op Day:  * No surgery found *
 SUBJECTIVE
 
      Patient Summary: refer to H&P and consult note for details 
 
      Events Overnight:  Patient seen and examined,denies CP or SOB or abdominal pain,stable
 VS,addressed all questions, HD at bed side, negative troponin.patient would rather be home 
not SNF. 
 
 Scheduled Medications
  
   apixaban  2.5 mg Oral BID 
   atorvastatin  40 mg Oral Nightly 
   calcium acetate  667 mg Oral TID WC 
 
   calcium carbonate  1,000 mg Oral Q48H 
  And 
   magnesium chloride  1 tablet Oral Q48H 
   carvedilol  12.5 mg Oral BID WC 
   cefTAZidime  2 g Intravenous After Hemodialysis (see admin instructions) 
   cholecalciferol  1,000 Units Oral Daily 
   clopidogrel  75 mg Oral Daily 
   insulin detemir  10 Units Subcutaneous Nightly 
   insulin lispro (human)  0-3 Units Subcutaneous Nightly 
   insulin lispro (human)  0-6 Units Subcutaneous TID AC 
   isosorbide mononitrate  60 mg Oral Daily 
   losartan  25 mg Oral Daily 
   nortriptyline  25-75 mg Oral See Admin Instructions 
   oxybutynin  2.5 mg Oral BID 
   pantoprazole  40 mg Oral QAM AC 
   rOPINIRole  0.75 mg Oral Nightly 
   vancomycin  500 mg Intravenous After Hemodialysis (see admin instructions) 
   Vortioxetine HBr  10 mg Oral Nightly 
 
 Continuous Infusions 
   dextrose   
 
 PRN Medications
 acetaminophen **OR** acetaminophen, albuterol, dextrose, dextrose, dextrose, glucagon, gluc
agon, HYDROcodone-acetaminophen, loperamide, LORazepam, nitroGLYCERIN, ondansetron **OR** on
dansetron, polyethylene glycol, prochlorperazine
 
 OBJECTIVE
 Vital Signs:
 /66  | Pulse 100  | Temp 98.3 F (36.8 C)  | Resp 18  | Ht 1.778 m (5' 10")  | Wt 
65.3 kg (143 lb 15.4 oz)  | SpO2 98%  | BMI 20.66 kg/m 
 
 Intake/Output Summary (Last 24 hours) at 19 1245
 Last data filed at 19 1207
  Gross per 24 hour 
 Intake             2000 ml 
 Output             3550 ml 
 Net            -1550 ml 
 
 General appearance: alert, appears stated age, cooperative, fatigued and no distress
 Head: Normocephalic, without obvious abnormality, atraumatic
 Neck: no adenopathy, no carotid bruit, no JVD, supple, symmetrical, trachea midline and thy
roid not enlarged, symmetric, no tenderness/mass/nodules
 Lungs: clear to auscultation bilaterally
 Heart: regular rate and rhythm, S1, S2 normal, no murmur, click, rub or gallop
 Abdomen: soft, non-tender; bowel sounds normal; no masses,  no organomegaly
 Extremities: extremities normal, atraumatic, no cyanosis or edema
 Pulses: 2+ and symmetric
 Neurologic: Grossly normal AXO3 non focal 
 
 DATA
 
 CBC:  
 Lab Results 
 Component Value Date 
  WBC 4.23 2019 
  RBC 2.50 (L) 2019 
  HGB 8.5 (L) 2019 
  HCT 26.0 (L) 2019 
  .8 (H) 2019 
 
  MCH 33.9 2019 
  MCHC 32.6 2019 
  RDW 66.1 (H) 2019 
   (L) 2019 
  MPV 8.7 2019 
  DIFFTYPE AUTOMATED 2019 
 
 CMP:  
 Lab Results 
 Component Value Date 
   2019 
  K 4.4 2019 
  CL 99 2019 
  CO2 32 2019 
  ANIONGAP 12 2019 
  GLUF 197 (H) 2019 
  BUN 24 2019 
  CREATININE 6.5 (H) 2019 
  BCR 4 2019 
  CA 8.7 2019 
  CA 8.7 2016 
  PROT 6.3 2019 
  ALB 2.2 (L) 2019 
  GLOB 3.7 2019 
  BILITOT 0.4 2019 
  ALP 85 2019 
  AST 23 2019 
  ALT 17 2019 
  EGFR 6 (L) 2019 
 
 Lab Results 
 Component Value Date 
  CKTOTAL 22 (L) 2019 
  CKMB 1.8 2019 
  CKMBINDEX 8.2 2019 
  TROPONINI 0.061 2019 
 
 Lab Results 
 Component Value Date 
  PHOS 4.4 2019 
 
 I have reviewed the above labs and radiology reports.
 
 PROBLEM LIST
 
 Principal Problem:
   Chest pain
 Active Problems:
   Coronary artery disease involving native coronary artery of native heart without angina p
ectoris
   ESRD (end stage renal disease) ([facility])
   Type 2 diabetes mellitus with chronic kidney disease on chronic dialysis, with long-term 
current use of insulin ([facility])
   Anemia in ESRD (end-stage renal disease) ([facility])
   Hypertension, essential
   Pleural effusion
   Chronic systolic congestive heart failure (HCC)
   Chronic diarrhea
   Chronic anticoagulation
   Cardiomyopathy ([facility])
 
   COPD (chronic obstructive pulmonary disease) ([facility])
   ICD (implantable cardioverter-defibrillator) in place
   Paroxysmal atrial fibrillation ([facility])
   Chronic multifocal osteomyelitis of left foot ([facility])
   PVD (peripheral vascular disease) ([facility])
 
 Patient diagnosed with:  , and I agree with the following nutritional recommendations:
  
 
 ASSESSMENT & PLAN
 
 Chest pain with known CAD, hx CABG, ischemic cardiomyopathy EF 20%, chronic systolic CHF, H
TN continue current meds also I have d/w and consulted  he [facility] medical managemen
t for now 
 
 P-AFRate controlled continue BB and Eliquis.
 
 L TMA on 18 NWB LLE per , continue abx per Dr Elliott.d/w pathology bone sherri
n is clean of infection 
 
 ESRD on HD per nephrology 
 
 Anemia due to ESRD defer to nephrology 
 
 DM2 continue current insulin regimen and monitor BG adjust as needed
 
 COPD Stable, not exacerbated O2 as needed and nebs 
 
 Anxiety increase ativan to Q6 prn 
 
 GI px PPI
 
 
 PT/OT eval and tx and CW for discharge planning
 
 Review of H/P, imaging and labs, formulation of assessment and plan, discussion with the pa
tient/family, staff, and providers. 
 
 Portions of this chart may have been copied from previous notes for continuity of care purp
oses.
 
 Disposition: admitted 
 Code Status:  Full Code
 
 Silas Ferrara MD
 2019MoBobo quinonez - 2019  7:42 PM PSTPt requested visit. Pt sitting up in bed. Pt 
gave brief hx of inadequate care at previous care facility which led to being at [facility]. Chp
 provided empathetic, supportive listening, entering into pt pain & struggle. Pt talked abou
t a recent "bad dream" she'd had where a threatening presence was in her room (at previous f
acility), & pt instinctive response of calling out to God for help. Pt wondered aloud if urszula
t & other problems meant she wasn't still a Taoist. [facility] pointed out that pt deep, initial
 response was to call out for God, which shows the depth of her ravinder & connection w/God. Pt
 seemed relieved at this observation. Pt not able to get out to Denominational, but does have a "TV 
preacher" that gives her the Word & encourages her. [facility] celebrated pt having resources that 
build her up & refresh her - encouraging her to continue doing what works for her. [facility] offer
ed to pray, pt accepted. p prayed for strength, healing, continuing closeness w/sustaining
 God. Pt thanked [facility] for visit & prayer.
 
 Chaplain Bobo Berrios this encounter
 
 
 Plan of Treatment
 
 
+--------+---------+-----------+----------------------+-------------+
| Date   | Type    | Specialty | Care Team            | Description |
+--------+---------+-----------+----------------------+-------------+
| 04/10/ | Office  | Podiatry  |   Srinivasan Jordan,  |             |
| 2019   | Visit   |           | DPM  [address], |             |
|        |         |           |  [address]  [place],  |             |
|        |         |           | [place] [address]             |             |
|        |         |           | [phone]         |             |
|        |         |           | [phone] (Fax)   |             |
+--------+---------+-----------+----------------------+-------------+
 as of this encounter
 
 Procedures
 
 
+----------------------+--------+-------------+----------------------+----------------------
+
| Procedure Name       | Priori | Date/Time   | Associated Diagnosis | Comments             
|
|                      | ty     |             |                      |                      
|
+----------------------+--------+-------------+----------------------+----------------------
+
| POCT GLUCOSE         | Routin | 2019  |                      |   Results for this   
|
|                      | e      | 11:45 AM    |                      | procedure are in the 
|
|                      |        | PST         |                      |  results section.    
|
+----------------------+--------+-------------+----------------------+----------------------
+
| SEDIMENTATION RATE,  | Routin | 2019  |                      |   Results for this   
|
| AUTOMATED            | e - AM |  5:54 AM    |                      | procedure are in the 
|
|                      |        | PST         |                      |  results section.    
|
+----------------------+--------+-------------+----------------------+----------------------
+
| CBC W/MANUAL DIFF    | Routin | 2019  |                      |   Results for this   
|
|                      | e      |  5:54 AM    |                      | procedure are in the 
|
|                      |        | PST         |                      |  results section.    
|
+----------------------+--------+-------------+----------------------+----------------------
+
| C-REACTIVE PROTEIN   | Routin | 2019  |                      |   Results for this   
|
|                      | e - AM |  5:54 AM    |                      | procedure are in the 
|
|                      |        | PST         |                      |  results section.    
|
+----------------------+--------+-------------+----------------------+----------------------
+
| COMPREHENSIVE        | Routin | 2019  |                      |   Results for this   
|
 
| METABOLIC PANEL      | e - AM |  5:54 AM    |                      | procedure are in the 
|
|                      |        | PST         |                      |  results section.    
|
+----------------------+--------+-------------+----------------------+----------------------
+
| POCT GLUCOSE         | Routin | 2019  |                      |   Results for this   
|
|                      | e      |  5:22 AM    |                      | procedure are in the 
|
|                      |        | PST         |                      |  results section.    
|
+----------------------+--------+-------------+----------------------+----------------------
+
| POCT GLUCOSE         | Routin | 2019  |                      |   Results for this   
|
|                      | e      |  9:02 PM    |                      | procedure are in the 
|
|                      |        | PST         |                      |  results section.    
|
+----------------------+--------+-------------+----------------------+----------------------
+
| POCT GLUCOSE         | Routin | 2019  |                      |   Results for this   
|
|                      | e      |  4:16 PM    |                      | procedure are in the 
|
|                      |        | PST         |                      |  results section.    
|
+----------------------+--------+-------------+----------------------+----------------------
+
| POCT GLUCOSE         | Routin | 2019  |                      |   Results for this   
|
|                      | e      | 12:17 PM    |                      | procedure are in the 
|
|                      |        | PST         |                      |  results section.    
|
+----------------------+--------+-------------+----------------------+----------------------
+
| CBC W/AUTO DIFF      | STAT   | 2019  |                      |   Results for this   
|
| (REFLEX TO MANUAL)   |        |  8:47 AM    |                      | procedure are in the 
|
|                      |        | PST         |                      |  results section.    
|
+----------------------+--------+-------------+----------------------+----------------------
+
| RENAL FUNCTION PANEL | STAT   | 2019  |                      |   Results for this   
|
|                      |        |  8:47 AM    |                      | procedure are in the 
|
|                      |        | PST         |                      |  results section.    
|
+----------------------+--------+-------------+----------------------+----------------------
+
| EKG STANDARD 12 LEAD | Routin | 2019  |                      |   Results for this   
|
|                      | e      |  5:50 AM    |                      | procedure are in the 
|
|                      |        | PST         |                      |  results section.    
|
 
+----------------------+--------+-------------+----------------------+----------------------
+
| POCT GLUCOSE         | Routin | 2019  |                      |   Results for this   
|
|                      | e      |  5:18 AM    |                      | procedure are in the 
|
|                      |        | PST         |                      |  results section.    
|
+----------------------+--------+-------------+----------------------+----------------------
+
| POCT GLUCOSE         | Routin | 2019  |                      |   Results for this   
|
|                      | e      | 10:42 PM    |                      | procedure are in the 
|
|                      |        | PST         |                      |  results section.    
|
+----------------------+--------+-------------+----------------------+----------------------
+
| POCT GLUCOSE         | Routin | 2019  |                      |   Results for this   
|
|                      | e      |  9:16 PM    |                      | procedure are in the 
|
|                      |        | PST         |                      |  results section.    
|
+----------------------+--------+-------------+----------------------+----------------------
+
| TROPONIN I           | Timed  | 2019  |                      |   Results for this   
|
|                      |        |  5:47 PM    |                      | procedure are in the 
|
|                      |        | PST         |                      |  results section.    
|
+----------------------+--------+-------------+----------------------+----------------------
+
| POCT GLUCOSE         | Routin | 2019  |                      |   Results for this   
|
|                      | e      |  4:56 PM    |                      | procedure are in the 
|
|                      |        | PST         |                      |  results section.    
|
+----------------------+--------+-------------+----------------------+----------------------
+
| EKG STANDARD 12 LEAD | STAT   | 2019  |                      |   Results for this   
|
|                      |        |  3:01 PM    |                      | procedure are in the 
|
|                      |        | PST         |                      |  results section.    
|
+----------------------+--------+-------------+----------------------+----------------------
+
| TROPONIN I           | Timed  | 2019  |                      |   Results for this   
|
|                      |        |  2:11 PM    |                      | procedure are in the 
|
|                      |        | PST         |                      |  results section.    
|
+----------------------+--------+-------------+----------------------+----------------------
+
| ED ISTAT TROPONIN    | STAT   | 2019  |                      |   Results for this   
|
 
|                      |        | 10:26 AM    |                      | procedure are in the 
|
|                      |        | PST         |                      |  results section.    
|
+----------------------+--------+-------------+----------------------+----------------------
+
| POC CARDIAC TROPONIN | Routin | 2019  |                      |   Results for this   
|
|                      | e      |  9:54 AM    |                      | procedure are in the 
|
|                      |        | PST         |                      |  results section.    
|
+----------------------+--------+-------------+----------------------+----------------------
+
| ED INFORMATION       | Routin | 2019  |                      |   Results for this   
|
| EXCHANGE             | e      |  9:47 AM    |                      | procedure are in the 
|
|                      |        | PST         |                      |  results section.    
|
+----------------------+--------+-------------+----------------------+----------------------
+
| EKG 12 LEAD UNIT     | Routin | 2019  |                      |   Results for this   
|
| PERFORMED            | e      |  9:44 AM    |                      | procedure are in the 
|
|                      |        | PST         |                      |  results section.    
|
+----------------------+--------+-------------+----------------------+----------------------
+
 in this encounter
 
 Results
 POCT glucose (2019 11:45 AM)
 
+--------------------+--------------------------+---------------+-----------------+
| Component          | Value                    | Ref Range     | Performed At    |
+--------------------+--------------------------+---------------+-----------------+
| GLUCOSE,POC SCREEN | 225 (H)Comment: Testing  | 65 - 99 mg/dL | [facility] |
|                    | [place] [facility] [place]     |               |                 |
|                    | [address];[place]   |               |                 |
|                    | [id number]                    |               |                 |
+--------------------+--------------------------+---------------+-----------------+
 
 
 
+-------------------+------------------+--------------------+--------------+
| Performing        | Address          | City/State/Zipcode | Phone Number |
| Organization      |                  |                    |              |
+-------------------+------------------+--------------------+--------------+
|   [facility] |   [address] | [place] [address] |              |
+-------------------+------------------+--------------------+--------------+
 CBC w/manual diff (2019  5:54 AM)
 
+----------------------+--------------------------------------------------------------------
-----+-------------------+--------------+
| Component            | Value                                                              
     | Ref Range         | Performed At |
+----------------------+--------------------------------------------------------------------
-----+-------------------+--------------+
 
| WBC                  | 4.08                                                               
     | 3.80 - 11.00 K/uL | [facility]   |
|                      |                                                                    
     |                   | LABORATORY   |
+----------------------+--------------------------------------------------------------------
-----+-------------------+--------------+
| RBC                  | 2.83 (L)                                                           
     | 3.70 - 5.10 M/uL  | [facility]   |
|                      |                                                                    
     |                   | LABORATORY   |
+----------------------+--------------------------------------------------------------------
-----+-------------------+--------------+
| HGB                  | 9.7 (L)                                                            
     | 11.3 - 15.5 g/dL  | [facility]   |
|                      |                                                                    
     |                   | LABORATORY   |
+----------------------+--------------------------------------------------------------------
-----+-------------------+--------------+
| HCT                  | 29.7 (L)                                                           
     | 34.0 - 46.0 %     | [facility]   |
|                      |                                                                    
     |                   | LABORATORY   |
+----------------------+--------------------------------------------------------------------
-----+-------------------+--------------+
| MCV                  | 105.0 (H)                                                          
     | 80.0 - 100.0 fl   | [facility]   |
|                      |                                                                    
     |                   | LABORATORY   |
+----------------------+--------------------------------------------------------------------
-----+-------------------+--------------+
| MCH                  | 34.2 (H)                                                           
     | 27.0 - 34.0 pg    | [facility]   |
|                      |                                                                    
     |                   | LABORATORY   |
+----------------------+--------------------------------------------------------------------
-----+-------------------+--------------+
| MCHC                 | 32.5                                                               
     | 32.0 - 35.5 g/dL  | [facility]   |
|                      |                                                                    
     |                   | LABORATORY   |
+----------------------+--------------------------------------------------------------------
-----+-------------------+--------------+
| RDW SD               | 64.3 (H)                                                           
     | 37 - 53 fl        | [facility]   |
|                      |                                                                    
     |                   | LABORATORY   |
+----------------------+--------------------------------------------------------------------
-----+-------------------+--------------+
| PLT                  | 125 (L)                                                            
     | 150 - 400 K/uL    | [facility]   |
|                      |                                                                    
     |                   | LABORATORY   |
+----------------------+--------------------------------------------------------------------
-----+-------------------+--------------+
| MPV                  | 9.5                                                                
     | fl                | [facility]   |
|                      |                                                                    
     |                   | LABORATORY   |
+----------------------+--------------------------------------------------------------------
-----+-------------------+--------------+
 
| DIFF TYPE            | MANUAL                                                             
     |                   | [facility]   |
|                      |                                                                    
     |                   | LABORATORY   |
+----------------------+--------------------------------------------------------------------
-----+-------------------+--------------+
| Neutrophils Manual   | 77                                                                 
     | %                 | [facility]   |
|                      |                                                                    
     |                   | LABORATORY   |
+----------------------+--------------------------------------------------------------------
-----+-------------------+--------------+
| Lymphocytes Manual   | 15                                                                 
     | %                 | [facility]   |
|                      |                                                                    
     |                   | LABORATORY   |
+----------------------+--------------------------------------------------------------------
-----+-------------------+--------------+
| Monocytes Manual     | 8                                                                  
     | %                 | [facility]   |
|                      |                                                                    
     |                   | LABORATORY   |
+----------------------+--------------------------------------------------------------------
-----+-------------------+--------------+
| Neutrophils Absolute | 3.14                                                               
     | 1.90 - 7.40 K/uL  | [facility]   |
|                      |                                                                    
     |                   | LABORATORY   |
+----------------------+--------------------------------------------------------------------
-----+-------------------+--------------+
| Lymphocytes Absolute | 0.61 (L)                                                           
     | 1.00 - 3.90 K/uL  | [facility]   |
|                      |                                                                    
     |                   | LABORATORY   |
+----------------------+--------------------------------------------------------------------
-----+-------------------+--------------+
| Monocytes Absolute   | 0.33                                                               
     | 0.00 - 0.80 K/uL  | [facility]   |
|                      |                                                                    
     |                   | LABORATORY   |
+----------------------+--------------------------------------------------------------------
-----+-------------------+--------------+
| MORPHOLOGY           | 1+Comment:                                                         
     |                   | [facility]   |
|                      | ANISO1+HYPONORMAL PLT                                              
     |                   | LABORATORY   |
|                      | MORPHTesting performed                                             
     |                   |              |
|                      | at [facility], [address]                                                     
     |                   |              |
|                      | [facility],                                                   
     |                   |              |
|                      | [place]    [address]                                             
     |                   |              |
|                      |Testing performed at [facility], [address], [place]    [phone] |                   |              |
|                      |                                                                    
     |                   |              |
+----------------------+--------------------------------------------------------------------
-----+-------------------+--------------+
 
 
 
 
+---------------+-------------------------+---------------------+----------------+
| Performing    | Address                 | City/State/Zipcode  | Phone Number   |
| Organization  |                         |                     |                |
+---------------+-------------------------+---------------------+----------------+
|   [facility]  |   [address]  | [place] [address] |   [phone] |
| LABORATORY    | [place]                   |                     |                |
+---------------+-------------------------+---------------------+----------------+
 Sedimentation rate, automated (2019  5:54 AM)
 
+-----------+--------------------------+--------------+--------------+
| Component | Value                    | Ref Range    | Performed At |
+-----------+--------------------------+--------------+--------------+
| ESR       | 24Comment: Testing       | 0 - 30 mm/Hr | [facility]   |
|           | performed at [facility], [address] |              | LABORATORY   |
|           |  [address],        |              |              |
|           | [place]  [id number]     |              |              |
+-----------+--------------------------+--------------+--------------+
 
 
 
+----------+
| Specimen |
+----------+
| Blood    |
+----------+
 
 
 
+---------------+-------------------------+---------------------+----------------+
| Performing    | Address                 | City/State/Zipcode  | Phone Number   |
| Organization  |                         |                     |                |
+---------------+-------------------------+---------------------+----------------+
|   [facility]  |   [address] [place] [address]  | [place] [address] |   [phone] |
| LABORATORY    | [address]                   |                     |                |
+---------------+-------------------------+---------------------+----------------+
 C-reactive protein (2019  5:54 AM)
 
+-----------+--------------------------+------------+--------------+
| Component | Value                    | Ref Range  | Performed At |
+-----------+--------------------------+------------+--------------+
| CRP       | <0.3Comment: Testing     | <0.5 mg/dL | [facility]   |
|           | performed at [facility], [address] |            | LABORATORY   |
|           |  [facility],        |            |              |
|           | [place]  [address]     |            |              |
+-----------+--------------------------+------------+--------------+
 
 
 
+----------+
| Specimen |
+----------+
| Blood    |
+----------+
 
 
 
+---------------+-------------------------+---------------------+----------------+
 
| Performing    | Address                 | City/State/Zipcode  | Phone Number   |
| Organization  |                         |                     |                |
+---------------+-------------------------+---------------------+----------------+
|   [facility]  |   [address]  | [place] [address] |   [phone] |
| LABORATORY    | [address]                   |                     |                |
+---------------+-------------------------+---------------------+----------------+
 Comprehensive metabolic panel (2019  5:54 AM)
 
+----------------+--------------------------+-------------------+--------------+
| Component      | Value                    | Ref Range         | Performed At |
+----------------+--------------------------+-------------------+--------------+
| SODIUM         | 138                      | 135 - 145 mmol/L  | [facility]   |
|                |                          |                   | LABORATORY   |
+----------------+--------------------------+-------------------+--------------+
| POTASSIUM      | 4.4                      | 3.5 - 4.9 mmol/L  | [facility]   |
|                |                          |                   | LABORATORY   |
+----------------+--------------------------+-------------------+--------------+
| CHLORIDE       | 100                      | 99 - 109 mmol/L   | [facility]   |
|                |                          |                   | LABORATORY   |
+----------------+--------------------------+-------------------+--------------+
| CO2            | 28                       | 23 - 32 mmol/L    | [facility]   |
|                |                          |                   | LABORATORY   |
+----------------+--------------------------+-------------------+--------------+
| ANION GAP AGAP | 14                       | 5 - 20 mmol/L     | [facility]   |
|                |                          |                   | LABORATORY   |
+----------------+--------------------------+-------------------+--------------+
| GLUCOSE        | 153 (H)                  | 65 - 99 mg/dL     | [facility]   |
|                |                          |                   | LABORATORY   |
+----------------+--------------------------+-------------------+--------------+
| BUN            | 15                       | 8 - 25 mg/dL      | [facility]   |
|                |                          |                   | LABORATORY   |
+----------------+--------------------------+-------------------+--------------+
| CREATININE     | 4.9 (H)                  | 0.50 - 1.00 mg/dL | [facility]   |
|                |                          |                   | LABORATORY   |
+----------------+--------------------------+-------------------+--------------+
| BUN/CREAT      | 3                        |                   | [facility]   |
|                |                          |                   | LABORATORY   |
+----------------+--------------------------+-------------------+--------------+
| CALCIUM        | 9.6                      | 8.5 - 10.5 mg/dL  | [facility]   |
|                |                          |                   | LABORATORY   |
+----------------+--------------------------+-------------------+--------------+
| TOTAL PROTEIN  | 6.1 (L)                  | 6.3 - 8.2 g/dL    | [facility]   |
|                |                          |                   | LABORATORY   |
+----------------+--------------------------+-------------------+--------------+
| Albumin        | 2.6 (L)                  | 3.3 - 4.8 g/dL    | [facility]   |
|                |                          |                   | LABORATORY   |
+----------------+--------------------------+-------------------+--------------+
| GLOBULIN       | 3.5                      | 1.3 - 4.9 g/dL    | [facility]   |
|                |                          |                   | LABORATORY   |
+----------------+--------------------------+-------------------+--------------+
| A/G            | 0.7 (L)                  | 1.0 - 2.4         | [facility]   |
|                |                          |                   | LABORATORY   |
+----------------+--------------------------+-------------------+--------------+
| TBIL           | 0.5                      | 0.1 - 1.5 mg/dL   | [facility]   |
|                |                          |                   | LABORATORY   |
+----------------+--------------------------+-------------------+--------------+
| ALK PHOS       | 85                       | 35 - 115 U/L      | [facility]   |
|                |                          |                   | LABORATORY   |
+----------------+--------------------------+-------------------+--------------+
| AST            | 22                       | 10 - 45 U/L       | [facility]   |
 
|                |                          |                   | LABORATORY   |
+----------------+--------------------------+-------------------+--------------+
| ALT            | 22                       | 10 - 65 U/L       | [facility]   |
|                |                          |                   | LABORATORY   |
+----------------+--------------------------+-------------------+--------------+
| EGFR           | 9 (L)Comment: GFR <60:   | >60 mL/min/1.73m2 | [facility]   |
|                | CHRONIC KIDNEY DISEASE,  |                   | LABORATORY   |
|                | IF FOUND OVER A 3 MONTH  |                   |              |
|                | PERIOD.GFR <15: KIDNEY   |                   |              |
|                | FAILURE.FOR       |                   |              |
|                | [employer], MULTIPLY THE  |                   |              |
|                | CALCULATED GFR BY        |                   |              |
|                | 1.210.This eGFR is       |                   |              |
|                | calculated using the     |                   |              |
|                | MDRD IDMS traceable      |                   |              |
|                | equation.Testing         |                   |              |
|                | performed at [facility], [address] |                   |              |
|                |  [facility],        |                   |              |
|                | [place]    [address]   |                   |              |
+----------------+--------------------------+-------------------+--------------+
 
 
 
+----------+
| Specimen |
+----------+
| Blood    |
+----------+
 
 
 
+---------------+-------------------------+---------------------+----------------+
| Performing    | Address                 | City/State/Zipcode  | Phone Number   |
| Organization  |                         |                     |                |
+---------------+-------------------------+---------------------+----------------+
|   [facility]  |   [address] [place] [address]  | [place] [address] |   [phone] |
| LABORATORY    | [place]                   |                     |                |
+---------------+-------------------------+---------------------+----------------+
 POCT glucose (2019  5:22 AM)
 
+--------------------+--------------------------+---------------+-----------------+
| Component          | Value                    | Ref Range     | Performed At    |
+--------------------+--------------------------+---------------+-----------------+
| GLUCOSE,POC SCREEN | 131 (H)Comment: Testing  | 65 - 99 mg/dL | [facility] |
|                    | performed at [facility]     |               |                 |
|                    | [address];[place]   |               |                 |
|                    | [address]                    |               |                 |
+--------------------+--------------------------+---------------+-----------------+
 
 
 
+-------------------+------------------+--------------------+--------------+
| Performing        | Address          | City/State/Zipcode | Phone Number |
| Organization      |                  |                    |              |
+-------------------+------------------+--------------------+--------------+
|   [facility] |   [address] | [place] [address] |              |
+-------------------+------------------+--------------------+--------------+
 POCT glucose (2019  9:02 PM)
 
+--------------------+--------------------------+---------------+-----------------+
 
| Component          | Value                    | Ref Range     | Performed At    |
+--------------------+--------------------------+---------------+-----------------+
| GLUCOSE,POC SCREEN | 164 (H)Comment: Testing  | 65 - 99 mg/dL | [facility] |
|                    | performed at [facility]     |               |                 |
|                    | [address];[place], [place]   |               |                 |
|                    | [address]                    |               |                 |
+--------------------+--------------------------+---------------+-----------------+
 
 
 
+-------------------+------------------+--------------------+--------------+
| Performing        | Address          | City/State/Zipcode | Phone Number |
| Organization      |                  |                    |              |
+-------------------+------------------+--------------------+--------------+
|   [facility] |   [address] | [place] [address] |              |
+-------------------+------------------+--------------------+--------------+
 POCT glucose (2019  4:16 PM)
 
+--------------------+--------------------------+---------------+-----------------+
| Component          | Value                    | Ref Range     | Performed At    |
+--------------------+--------------------------+---------------+-----------------+
| GLUCOSE,POC SCREEN | 143 (H)Comment: Testing  | 65 - 99 mg/dL | [facility] |
|                    | performed at [facility]     |               |                 |
|                    | [address];[place]   |               |                 |
|                    | [id number]                    |               |                 |
+--------------------+--------------------------+---------------+-----------------+
 
 
 
+-------------------+------------------+--------------------+--------------+
| Performing        | Address          | City/State/Zipcode | Phone Number |
| Organization      |                  |                    |              |
+-------------------+------------------+--------------------+--------------+
|   [facility] |   [address] | [place] [address] |              |
+-------------------+------------------+--------------------+--------------+
 POCT glucose (2019 12:17 PM)
 
+--------------------+--------------------------+---------------+-----------------+
| Component          | Value                    | Ref Range     | Performed At    |
+--------------------+--------------------------+---------------+-----------------+
| GLUCOSE,POC SCREEN | 129 (H)Comment: Testing  | 65 - 99 mg/dL | [facility] |
|                    | performed at [facility]     |               |                 |
|                    | [address];[place]   |               |                 |
|                    | [id number]                    |               |                 |
+--------------------+--------------------------+---------------+-----------------+
 
 
 
+-------------------+------------------+--------------------+--------------+
| Performing        | Address          | City/State/Zipcode | Phone Number |
| Organization      |                  |                    |              |
+-------------------+------------------+--------------------+--------------+
|   [facility] |   [address] | [facility] [place] [address] |              |
+-------------------+------------------+--------------------+--------------+
 Renal function panel (2019  8:47 AM)
 
+----------------+--------------------------+-------------------+-----------------+
| Component      | Value                    | Ref Range         | Performed At    |
+----------------+--------------------------+-------------------+-----------------+
| SODIUM         | 139                      | 135 - 145 mmol/L  | [facility] |
 
+----------------+--------------------------+-------------------+-----------------+
| POTASSIUM      | 4.4                      | 3.5 - 4.9 mmol/L  | [facility] |
+----------------+--------------------------+-------------------+-----------------+
| CHLORIDE       | 99                       | 99 - 109 mmol/L   | [facility] |
+----------------+--------------------------+-------------------+-----------------+
| CO2            | 32                       | 23 - 32 mmol/L    | [facility] |
+----------------+--------------------------+-------------------+-----------------+
| ANION GAP AGAP | 12                       | 5 - 20 mmol/L     | [facility] |
+----------------+--------------------------+-------------------+-----------------+
| GLUCOSE        | 197 (H)                  | 65 - 99 mg/dL     | [facility] |
+----------------+--------------------------+-------------------+-----------------+
| BUN            | 24                       | 8 - 25 mg/dL      | [facility] |
+----------------+--------------------------+-------------------+-----------------+
| CREATININE     | 6.5 (H)                  | 0.50 - 1.00 mg/dL | [facility] |
+----------------+--------------------------+-------------------+-----------------+
| CALCIUM        | 8.7                      | 8.5 - 10.5 mg/dL  | [facility] |
+----------------+--------------------------+-------------------+-----------------+
| Albumin        | 2.2 (L)                  | 3.3 - 4.8 g/dL    | [facility] |
+----------------+--------------------------+-------------------+-----------------+
| PHOSPHORUS     | 4.4                      | 2.3 - 4.8 mg/dL   | [facility] |
+----------------+--------------------------+-------------------+-----------------+
| EGFR           | 6 (L)Comment: GFR <60:   | >60 mL/min/1.73m2 | [facility] |
|                | CHRONIC KIDNEY DISEASE,  |                   |                 |
|                | IF FOUND OVER A 3 MONTH  |                   |                 |
|                | PERIOD.GFR <15: KIDNEY   |                   |                 |
|                | FAILURE.FOR       |                   |                 |
|                | [employer]IC[employer], MULTIPLY THE  |                   |                 |
|                | CALCULATED GFR BY        |                   |                 |
|                | 1.210.This eGFR is       |                   |                 |
|                | calculated using the     |                   |                 |
|                | MDRD IDMS traceable      |                   |                 |
|                | equation.Testing         |                   |                 |
|                | performed at [facility]     |                   |                 |
|                | [address];[place], [place]   |                   |                 |
|                | [id number]                    |                   |                 |
+----------------+--------------------------+-------------------+-----------------+
 
 
 
+----------+
| Specimen |
+----------+
| Blood    |
+----------+
 
 
 
+-------------------+------------------+--------------------+--------------+
| Performing        | Address          | City/State/Zipcode | Phone Number |
| Organization      |                  |                    |              |
+-------------------+------------------+--------------------+--------------+
|   [facility] |   [address] | [place] [address] |              |
+-------------------+------------------+--------------------+--------------+
 CBC w/auto diff (reflex to manual) (2019  8:47 AM)
 
+-----------------+-------------------------+-------------------+-----------------+
| Component       | Value                   | Ref Range         | Performed At    |
+-----------------+-------------------------+-------------------+-----------------+
| WBC             | 4.23                    | 3.80 - 11.00 K/uL | [facility] |
+-----------------+-------------------------+-------------------+-----------------+
 
| RBC             | 2.50 (L)                | 3.70 - 5.10 M/uL  | [facility] |
+-----------------+-------------------------+-------------------+-----------------+
| HGB             | 8.5 (L)                 | 11.3 - 15.5 g/dL  | [facility] |
+-----------------+-------------------------+-------------------+-----------------+
| HCT             | 26.0 (L)                | 34.0 - 46.0 %     | [facility] |
+-----------------+-------------------------+-------------------+-----------------+
| MCV             | 103.8 (H)               | 80.0 - 100.0 fl   | [facility] |
+-----------------+-------------------------+-------------------+-----------------+
| MCH             | 33.9                    | 27.0 - 34.0 pg    | [facility] |
+-----------------+-------------------------+-------------------+-----------------+
| MCHC            | 32.6                    | 32.0 - 35.5 g/dL  | [facility] |
+-----------------+-------------------------+-------------------+-----------------+
| RDW SD          | 66.1 (H)                | 37 - 53 fl        | [facility] |
+-----------------+-------------------------+-------------------+-----------------+
| PLT             | 134 (L)                 | 150 - 400 K/uL    | [email or web address] |
+-----------------+-------------------------+-------------------+-----------------+
| MPV             | 8.7                     | fl                | [email or web address] |
+-----------------+-------------------------+-------------------+-----------------+
| DIFF TYPE       | AUTOMATED               |                   | [facility] |
+-----------------+-------------------------+-------------------+-----------------+
| NEUTROPHILS     | 74.02                   | %                 | [facility] |
+-----------------+-------------------------+-------------------+-----------------+
| LYMPHOCYTES     | 16.94                   | %                 | [facility] |
+-----------------+-------------------------+-------------------+-----------------+
| MONOCYTES       | 7.65                    | %                 | [facility] |
+-----------------+-------------------------+-------------------+-----------------+
| EOSINOPHILS     | 0.00                    | %                 | [facility] |
+-----------------+-------------------------+-------------------+-----------------+
| BASOPHILS       | 1.39                    | %                 | [facility] |
+-----------------+-------------------------+-------------------+-----------------+
| NEUTROPHILS ABS | 3.13                    | 1.90 - 7.40 K/uL  | [facility] |
+-----------------+-------------------------+-------------------+-----------------+
| LYMPHOCYTES ABS | 0.72 (L)                | 1.00 - 3.90 K/uL  | [facility] |
+-----------------+-------------------------+-------------------+-----------------+
| MONOCYTES ABS   | 0.32                    | 0.00 - 0.80 K/uL  | [facility] |
+-----------------+-------------------------+-------------------+-----------------+
| EOSINOPHILS ABS | 0.00                    | 0.00 - 0.50 K/uL  | [facility] |
+-----------------+-------------------------+-------------------+-----------------+
| BASOPHILS ABS   | 0.06Comment: Testing    | 0.00 - 0.10 K/uL  | [facility] |
|                 | performed at [facility]    |                   |                 |
|                 | [address];[place]  |                   |                 |
|                 | [id number]                   |                   |                 |
+-----------------+-------------------------+-------------------+-----------------+
 
 
 
+----------+
| Specimen |
+----------+
| Blood    |
+----------+
 
 
 
+-------------------+------------------+--------------------+--------------+
| Performing        | Address          | City/State/Zipcode | Phone Number |
| Organization      |                  |                    |              |
+-------------------+------------------+--------------------+--------------+
|   [facility] |   [address] | [place] [address] |              |
+-------------------+------------------+--------------------+--------------+
 
 EKG STANDARD 12 LEAD (2019  5:50 AM)
 
+-------------------+--------------------------+-----------+--------------+
| Component         | Value                    | Ref Range | Performed At |
+-------------------+--------------------------+-----------+--------------+
| Ventricular Rate  | 87                       | BPM       | [facility]     |
+-------------------+--------------------------+-----------+--------------+
| Atrial Rate       | 87                       | BPM       | [facility]     |
+-------------------+--------------------------+-----------+--------------+
| P-R Interval      | 138                      | ms        | [facility]     |
+-------------------+--------------------------+-----------+--------------+
| QRS Duration      | 122                      | ms        | [facility]     |
+-------------------+--------------------------+-----------+--------------+
| Q-T Interval      | 420                      | ms        | [facility]     |
+-------------------+--------------------------+-----------+--------------+
| QTC Calculation   | 505                      | ms        | [facility]     |
| (Bezet)           |                          |           |              |
+-------------------+--------------------------+-----------+--------------+
| Calculated P Axis | 69                       | degrees   | [facility]     |
+-------------------+--------------------------+-----------+--------------+
| Calculated R Axis | -28                      | degrees   | [facility]     |
+-------------------+--------------------------+-----------+--------------+
| Calculated T Axis | 67                       | degrees   | [facility]     |
+-------------------+--------------------------+-----------+--------------+
| Diagnosis         | Normal sinus             |           | [facility]     |
|                   | rhythmNon-specific       |           |              |
|                   | intra-ventricular        |           |              |
|                   | conduction               |           |              |
|                   | delayNonspecific ST      |           |              |
|                   | abnormalityPoor R -      |           |              |
|                   | progressionWhen compared |           |              |
|                   |  with ECG of 2019 |           |              |
|                   |  15:01,Premature         |           |              |
|                   | ventricular complexes    |           |              |
|                   | are no longer PresentT   |           |              |
|                   | wave inversion no longer |           |              |
|                   |  evident in Inferior     |           |              |
|                   | leadsConfirmed by JOVITA,  |           |              |
|                   | JACEY ([id number]) on 2019 |           |              |
|                   |  4:19:02 PM              |           |              |
+-------------------+--------------------------+-----------+--------------+
 
 
 
+--------------+-------------------+--------------------+--------------+
| Performing   | Address           | City/State/Zipcode | Phone Number |
| Organization |                   |                    |              |
+--------------+-------------------+--------------------+--------------+
|   [facility]   |   [address] | [place] [address] |              |
+--------------+-------------------+--------------------+--------------+
 POCT glucose (2019  5:18 AM)
 
+--------------------+--------------------------+---------------+-----------------+
| Component          | Value                    | Ref Range     | Performed At    |
+--------------------+--------------------------+---------------+-----------------+
| GLUCOSE,POC SCREEN | 167 (H)Comment: Testing  | 65 - 99 mg/dL | [facility] |
|                    | performed at [facility]     |               |                 |
|                    | [address];[place]   |               |                 |
|                    | [id number]                    |               |                 |
+--------------------+--------------------------+---------------+-----------------+
 
 
 
 
+-------------------+------------------+--------------------+--------------+
| Performing        | Address          | City/State/Zipcode | Phone Number |
| Organization      |                  |                    |              |
+-------------------+------------------+--------------------+--------------+
|   [facility] |   [address] | [place] [address] |              |
+-------------------+------------------+--------------------+--------------+
 POCT glucose (2019 10:42 PM)
 
+--------------------+--------------------------+---------------+-----------------+
| Component          | Value                    | Ref Range     | Performed At    |
+--------------------+--------------------------+---------------+-----------------+
| GLUCOSE,POC SCREEN | 207 (H)Comment: Testing  | 65 - 99 mg/dL | [facility] |
|                    | performed at [facility]     |               |                 |
|                    | [address];[place]   |               |                 |
|                    | [id number]                    |               |                 |
+--------------------+--------------------------+---------------+-----------------+
 
 
 
+-------------------+------------------+--------------------+--------------+
| Performing        | Address          | City/State/Zipcode | Phone Number |
| Organization      |                  |                    |              |
+-------------------+------------------+--------------------+--------------+
|   [facility] |   [address] | [place] [address] |              |
+-------------------+------------------+--------------------+--------------+
 POCT glucose (2019  9:16 PM)
 
+--------------------+--------------------------+---------------+-----------------+
| Component          | Value                    | Ref Range     | Performed At    |
+--------------------+--------------------------+---------------+-----------------+
| GLUCOSE,POC SCREEN | 213 (H)Comment: Testing  | 65 - 99 mg/dL | [facility] |
|                    | performed at [facility]     |               |                 |
|                    | [address];[place]   |               |                 |
|                    | [address]                    |               |                 |
+--------------------+--------------------------+---------------+-----------------+
 
 
 
+-------------------+------------------+--------------------+--------------+
| Performing        | Address          | City/State/Zipcode | Phone Number |
| Organization      |                  |                    |              |
+-------------------+------------------+--------------------+--------------+
|   [facility] |   [address] | [facility] [place] [place] [address] |              |
+-------------------+------------------+--------------------+--------------+
 Troponin I (2019  5:47 PM)
 
+------------+--------------------------+-------------------+-----------------+
| Component  | Value                    | Ref Range         | Performed At    |
+------------+--------------------------+-------------------+-----------------+
| TROPONIN I | 0.061Comment:   0.00 to  | 0.00 - 0.10 ng/mL | [facility] |
|            | 0.10     CONSISTENT WITH |                   |                 |
|            |  NORMAL POPULATION0.11   |                   |                 |
|            | to 0.60     CONSISTENT   |                   |                 |
|            | WITH INCREASED RISK FOR  |                   |                 |
|            | ADVERSE OUTCOMES>        |                   |                 |
|            | 0.60                     |                   |                 |
|            | CONSISTENT WITH WHO      |                   |                 |
 
|            | CRITERIA FOR ACUTE MI    |                   |                 |
|            | Testing performed at     |                   |                 |
|            | [facility]                   |                 |
|            [address];[place] [address]   |                   |                 |
+------------+--------------------------+-------------------+-----------------+
 
 
 
+----------+
| Specimen |
+----------+
| Blood    |
+----------+
 
 
 
+-------------------+------------------+--------------------+--------------+
| Performing        | Address          | City/State/Zipcode | Phone Number |
| Organization      |                  |                    |              |
+-------------------+------------------+--------------------+--------------+
|   [facility] |   [address] | [place] [address] |              |
+-------------------+------------------+--------------------+--------------+
 POCT glucose (2019  4:56 PM)
 
+--------------------+--------------------------+---------------+-----------------+
| Component          | Value                    | Ref Range     | Performed At    |
+--------------------+--------------------------+---------------+-----------------+
| GLUCOSE,POC SCREEN | 133 (H)Comment: Testing  | 65 - 99 mg/dL | [facility] |
|                    | performed at [facility];[address]     |               |                 |
|                    | [address];[place]   |               |                 |
|                    | [id number]                    |               |                 |
+--------------------+--------------------------+---------------+-----------------+
 
 
 
+-------------------+------------------+--------------------+--------------+
| Performing        | Address          | City/State/Zipcode | Phone Number |
| Organization      |                  |                    |              |
+-------------------+------------------+--------------------+--------------+
|   [facility] |   [address] | [place] [address] |              |
+-------------------+------------------+--------------------+--------------+
 EKG STANDARD 12 LEAD (2019  3:01 PM)
 
+-------------------+--------------------------+-----------+--------------+
| Component         | Value                    | Ref Range | Performed At |
+-------------------+--------------------------+-----------+--------------+
| Ventricular Rate  | 86                       | BPM       | [facility]     |
+-------------------+--------------------------+-----------+--------------+
| Atrial Rate       | 86                       | BPM       | [facility]     |
+-------------------+--------------------------+-----------+--------------+
| P-R Interval      | 138                      | ms        | [facility]     |
+-------------------+--------------------------+-----------+--------------+
| QRS Duration      | 120                      | ms        | [facility]     |
+-------------------+--------------------------+-----------+--------------+
| Q-T Interval      | 450                      | ms        | [facility]     |
+-------------------+--------------------------+-----------+--------------+
| QTC Calculation   | 538                      | ms        | [facility]     |
| (Bezet)           |                          |           |              |
+-------------------+--------------------------+-----------+--------------+
| Calculated P Axis | 60                       | degrees   | [facility]     |
 
+-------------------+--------------------------+-----------+--------------+
| Calculated R Axis | -31                      | degrees   | [facility]     |
+-------------------+--------------------------+-----------+--------------+
| Calculated T Axis | -32                      | degrees   | [facility]     |
+-------------------+--------------------------+-----------+--------------+
| Diagnosis         | Sinus rhythm with        |           | [facility]     |
|                   | frequent Premature       |           |              |
|                   | ventricular              |           |              |
|                   | complexesLeft axis       |           |              |
|                   | deviationLeft bundle     |           |              |
|                   | branch block,            |           |              |
|                   | completeNonspecific ST   |           |              |
|                   | and T wave               |           |              |
|                   | abnormalityAbnormal      |           |              |
|                   | ECGWhen compared with    |           |              |
|                   | ECG of 2019       |           |              |
|                   | 09:44,Premature          |           |              |
|                   | ventricular complexes    |           |              |
|                   | are now PresentT wave    |           |              |
|                   | inversion now evident in |           |              |
|                   |  Inferior leadsConfirmed |           |              |
|                   |  by JACEY HUSSEIN (209) on |           |              |
|                   |  2019 4:46:52 PM    |           |              |
+-------------------+--------------------------+-----------+--------------+
 
 
 
+--------------+-------------------+--------------------+--------------+
| Performing   | Address           | City/State/Zipcode | Phone Number |
| Organization |                   |                    |              |
+--------------+-------------------+--------------------+--------------+
|   [facility]   |   [address] | [place] [address] |              |
+--------------+-------------------+--------------------+--------------+
 Troponin I (2019  2:11 PM)
 
+------------+--------------------------+-------------------+-----------------+
| Component  | Value                    | Ref Range         | Performed At    |
+------------+--------------------------+-------------------+-----------------+
| TROPONIN I | 0.075Comment:   0.00 to  | 0.00 - 0.10 ng/mL | [facility] |
|            | 0.10     CONSISTENT WITH |                   |                 |
|            |  NORMAL POPULATION0.11   |                   |                 |
|            | to 0.60     CONSISTENT   |                   |                 |
|            | WITH INCREASED RISK FOR  |                   |                 |
|            | ADVERSE OUTCOMES>        |                   |                 |
|            | 0.60                     |                   |                 |
|            | CONSISTENT WITH WHO      |                   |                 |
|            | CRITERIA FOR ACUTE MI    |                   |                 |
|            | Testing performed at     |                   |                 |
|            | [facility];[address]            |                   |                 |
|            | [address];[place] [address]   |                   |                 |
+------------+--------------------------+-------------------+-----------------+
 
 
 
+----------+
| Specimen |
+----------+
| Blood    |
+----------+
 
 
 
 
+-------------------+------------------+--------------------+--------------+
| Performing        | Address          | City/State/Zipcode | Phone Number |
| Organization      |                  |                    |              |
+-------------------+------------------+--------------------+--------------+
|   [facility] |   [address] | [place] [address] |              |
+-------------------+------------------+--------------------+--------------+
 ISTAT Troponin (2019 10:26 AM)
 
+----------------+-------+-----------+--------------+
| Component      | Value | Ref Range | Performed At |
+----------------+-------+-----------+--------------+
| POC Troponin I | 0.04  |           |              |
+----------------+-------+-----------+--------------+
 POC cardiac troponin (2019  9:54 AM)
 
+----------------------+--------------------------+-------------------+-----------------+
| Component            | Value                    | Ref Range         | Performed At    |
+----------------------+--------------------------+-------------------+-----------------+
| POC CARDIAC TROPONIN | 0.04Comment:   0.00 to   | 0.00 - 0.10 ng/mL | [facility] |
|                      | 0.10    Consistent with  |                   |                 |
|                      | normal population0.11 to |                   |                 |
|                      |  0.40    Consistent with |                   |                 |
|                      |  increased risk for      |                   |                 |
|                      | adverse                  |                   |                 |
|                      | outcomes>0.40            |                   |                 |
|                      |       Consistent with    |                   |                 |
|                      | WHO criteria for Acute   |                   |                 |
|                      | MI Testing performed at  |                   |                 |
|                      | [facility] [place] [address]            |                   |                 |
|                      | [facility];[place] [address]   |                   |                 |
+----------------------+--------------------------+-------------------+-----------------+
 
 
 
+-------------------+------------------+--------------------+--------------+
| Performing        | Address          | City/State/Zipcode | Phone Number |
| Organization      |                  |                    |              |
+-------------------+------------------+--------------------+--------------+
|   [facility] |   [address] | [place] [address] |              |
+-------------------+------------------+--------------------+--------------+
 ED INFORMATION EXCHANGE (2019  9:47 AM)
 
+------------------------------------------------------------------------+--------------+
| Narrative                                                              | Performed At |
+------------------------------------------------------------------------+--------------+
|   [id number]     This patient has registered at the     |   ED         |
| [facility] Emergency Department   For more         | INFORMATION  |
| information visit:                                                     | EXCHANGE     |
| [email or web address] |              |
| [id number]   Security Events  No recent Security Events currently on file  |              |
|     ED Care Guidelines from [facility]  Last Updated: 18 |              |
|  10:16 AM         Other Information:  Currently being seen by [facility] |              |
|  in [place] for mental health concerns.[phone]  These are       |              |
| guidelines and the provider should exercise clinical judgment when     |              |
| providing care.  Additional plans are also available online from the   |              |
| following facilities: [facility]   Recent Emergency          |              |
| Department Visit Summary  Admit Date Facility City State Type Major    |              |
| Type Diagnoses or Chief Complaint   2019 [facility]  |              |
 
| [place] Emergency    Emergency          Chest Pain        Nausea     |              |
|      Shortness of Breath      2019 [facility] [place] |              |
|  [place] Emergency    Emergency          CHEST PAIN        Abnormal levels  |              |
| of other serum enzymes        Personal history of other diseases of    |              |
| the circulatory system        Chest pain, unspecified      2019 |              |
|  [facility] [place] Emergency    Emergency                 |              |
|     FISTULA BLEEDING        Hemorrhage due to vascular prosthetic      |              |
| devices, implants and grafts, initial encounter      Dec 22, 2018 [facility] |              |
|  [facility] [employer] Emergency    Emergency          CHEST PAIN  |              |
|        Type 2 diabetes mellitus with hyperglycemia        Chest pain,  |              |
| unspecified      2018 [facility] [place]            |              |
| Emergency    Emergency    Chief Complaint: SOB      2018 [facility]  |              |
| [facility] [place] Emergency    Emergency          FALL         |              |
| Unspecified fall, initial encounter        Dependence on renal         |              |
| dialysis        End stage renal disease      2018 [facility]         |              |
| [facility] [place] Emergency    Emergency          FALL         |              |
| Essential (primary) hypertension        Type 2 diabetes mellitus with  |              |
| hyperglycemia        Type 2 diabetes mellitus with unspecified         |              |
| complications        Unspecified fall, initial encounter               |              |
| Headache      2018 [facility] [place]               |              |
| Emergency    Emergency          -1. Dorsalgia, unspecified        -1.  |              |
| Painful micturition, unspecified        0. Frequency of micturition    |              |
|      1. Urinary tract infection, site not specified        6. Type 2   |              |
| diabetes mellitus with hyperglycemia        7. Type 2 diabetes         |              |
| mellitus with diabetic chronic kidney disease        8. End stage      |              |
| renal disease        9. Dependence on renal dialysis        10.        |              |
| Shortness of breath        11. Long term (current) use of              |              |
| anticoagulants            E.D. Visit Count (12 mo.)  Facility Visits   |              |
| Low Acuity   [facility] 19 0   [facility] 2 0 |              |
|    [facility] 2 0   Total 23 0   Note: Visits      |              |
| indicate total known visits. Medicaid Low Acuity Dx are the number of  |              |
| primary diagnoses on the Medicaid's Low Acuity dx list.         Recent |              |
|  Inpatient Visit Summary  Admit Date Facility City State Type Major    |              |
| Type Diagnoses or Chief Complaint   [facility] [place] [place] [facility]    Inpatient          Peripheral arterial disease,  |              |
| osteomyelitis left foot        Other acute osteomyelitis, left ankle   |              |
| and foot        Long term (current) use of antibiotics        End      |              |
| stage renal disease        Anemia in chronic kidney disease      Dec   |              |
| 2018 [facility] [place] [place] [facility]                         |              |
| Medicine    Inpatient          Peripheral vascular disease,            |              |
| unspecified        End stage renal disease        Dependence on renal  |              |
| dialysis        Long term (current) use of insulin        Other        |              |
| chronic osteomyelitis, left ankle and foot        Type 2 diabetes      |              |
| mellitus with diabetic chronic kidney disease        Essential         |              |
| (primary) hypertension      2018 [facility] [place]   |              |
| [place] Inpatient    Inpatient          Upper GIB        Other chronic      |              |
| osteomyelitis, unspecified ankle and foot        End stage renal       |              |
| disease        Other specified personal risk factors, not elsewhere    |              |
| classified        Chronic systolic (congestive) heart failure          |              |
| Anemia in chronic kidney disease        Other disorders of             |              |
| plasma-protein metabolism, not elsewhere classified        Moderate    |              |
| protein-calorie malnutrition        Other disorders of phosphorus      |              |
| metabolism      2018 [facility]. [place] Medical     |              |
| Surgical    Inpatient          1. Type 2 diabetes mellitus with other  |              |
| specified complication        2. Urinary tract infection, site not     |              |
| specified        3. Unspecified protein-calorie malnutrition        4. |              |
|  Other chronic osteomyelitis, unspecified site        5. Hypertensive  |              |
| heart and chronic kidney disease with heart failure and with stage 5   |              |
| chronic kidney disease, or end stage renal disease        6. Chronic   |              |
| diastolic (congestive) heart failure        7. Other osteomyelitis,    |              |
 
| ankle and foot        8. Type 2 diabetes mellitus with foot ulcer      |              |
|      9. Atherosclerotic heart disease of native coronary artery        |              |
| without angina pectoris        10. Chronic obstructive pulmonary       |              |
| disease, unspecified      2018 [facility] [place] [place]    |              |
| Medical Surgical    Inpatient          1. Benign paroxysmal vertigo,   |              |
| unspecified ear        2. End stage renal disease        3.            |              |
| Hypertensive chronic kidney disease with stage 5 chronic kidney        |              |
| disease or end stage renal disease        4. Urinary tract infection,  |              |
| site not specified        5. Hypertensive heart and chronic kidney     |              |
| disease with heart failure and with stage 5 chronic kidney disease, or |              |
|  end stage renal disease        6. Kidney transplant status        7.  |              |
| Unspecified systolic (congestive) heart failure        8. Syncope and  |              |
| collapse        9. Type 2 diabetes mellitus with diabetic chronic      |              |
| kidney disease        10. Atherosclerotic heart disease of native      |              |
| coronary artery without angina pectoris            Prescription Drug   |              |
| Report (12 Mo.)  PDMP query found no report.     Care Providers        |              |
| Provider PRC Type Phone Fax Service Dates   HEADINGS, SANTIAGO, F.N.P.     |              |
| Nurse Practitioner: Family     Current      [facility]     |              |
| Care [phone]    Oct 1, 2016 - Current      [facility]       |              |
| [facility]    [phone] Current      [facility]       |              |
| [facility]     Current      [facility]   |              |
| Mental Health Provider [phone] Current           |              |
| [employer] Case or Care Manager     Current      Willard Crook -       |              |
| MIRTA Ortiz Case or Care Manager [phone]     |              |
| Current      Jairo Gutierrez MD Other     Current         Known      |              |
| Aliases  No known aliases.   Criteria met         Care Guidelines      |              |
|     10 in 12      3 in 60      2 in 2     The above information is     |              |
| provided for the sole purpose of patient treatment. Use of this        |              |
| information beyond the terms of Data Sharing Memorandum of             |              |
| Understanding and License Agreement is prohibited. In certain cases    |              |
| not all visits may be represented.   Consult the aforementioned        |              |
| facilities for additional information.   2019 [facility]       |              |
| [employer] - [place], [place] -                              |              |
| [email or web address]                                         |              |
+------------------------------------------------------------------------+--------------+
 
 
 
+-------------------------------------------------------------------------------------------
--------------------------------------------------------------------------------------------
--------------------+
| Procedure Note                                                                            
                                                                                            
                    |
+-------------------------------------------------------------------------------------------
--------------------------------------------------------------------------------------------
--------------------+
|   Interface, Lab - 2019  9:48 AM PST  Formatting of this note may be different      
                                                                                            
                    |
| from the original.[id number] [id number] patient has registered at the [facility]      
                                                                                            
                    |
| [facility] Emergency Department For more information visit:                  
                                                                                            
                    |
| [email or web address]     
                                                                                            
                    |
| EventsNo recent Security Events currently on fileED Care Guidelines from [facility] -       
 
                                                                                            
                    |
| [place] Updated: 18 10:16 AM  Other Information:Currently being seen by         
                                                                                            
                    |
| [facility] in [place] for mental health concerns.[phone] are guidelines and     
                                                                                            
                    |
| the provider should exercise clinical judgment when providing care.Additional plans are   
                                                                                            
                    |
| also available online from the following facilities: [facility] Recent          
                                                                                            
                    |
| Emergency Department Visit SummaryAdmit Date Facility City State Type Major Type          
                                                                                            
                    |
| Diagnoses [facility] [place] Emergency        
                                                                                            
                    |
| Emergency    Chest Pain    Nausea    Shortness of Breath  2019 [facility]      
                                                                                            
                    |
| [facility] [place]. [place] Emergency  Emergency    CHEST PAIN    Abnormal levels of other serum     
                                                                                            
                    |
| enzymes    Personal history of other diseases of the circulatory system    Chest pain,    
                                                                                            
                    |
| unspecified  2019 [facility] [place] Emergency  Emergency    FISTULA   
                                                                                            
                    |
| BLEEDING    Hemorrhage due to vascular prosthetic devices, implants and grafts, initial   
                                                                                            
                    |
| encounter  Dec 22, 2018 [facility] [place] Emergency  Emergency    CHEST      
                                                                                            
                    |
| PAIN    Type 2 diabetes mellitus with hyperglycemia    Chest pain, unspecified  ,   
                                                                                            
                    |
|  [facility] [place] Emergency  Emergency  Chief Complaint: SOB  ,    
                                                                                            
                    |
| 2018 [facility] [place] Emergency  Emergency    FALL    Unspecified fall,     
                                                                                            
                    |
| initial encounter    Dependence on renal dialysis    End stage renal disease  ,     
                                                                                            
                    |
|  [facility] [place] Emergency  Emergency    FALL    Essential (primary)   
                                                                                            
                    |
| hypertension    Type 2 diabetes mellitus with hyperglycemia    Type 2 diabetes mellitus   
                                                                                            
                    |
| with unspecified complications    Unspecified fall, initial encounter    Headache  Nov    
                                                                                            
                    |
| 2018 [facility] [place] Emergency  Emergency    -1. Dorsalgia,             
 
                                                                                            
                    |
| unspecified    -1. Painful micturition, unspecified    0. Frequency of micturition    1.  
                                                                                            
                    |
|  Urinary tract infection, site not specified    6. Type 2 diabetes mellitus with          
                                                                                            
                    |
| hyperglycemia    7. Type 2 diabetes mellitus with diabetic chronic kidney disease    8.   
                                                                                            
                    |
| End stage renal disease    9. Dependence on renal dialysis    10. Shortness of breath     
                                                                                            
                    |
|  11. Long term (current) use of anticoagulants  E.D. Visit Count (12 mo.)Facility Visits  
                                                                                            
                    |
|  Low Acuity [facility] 19 0 [facility] 2 0 [facility]       
                                                                                            
                    |
| [facility] 2 0 Total 23 0 Note: Visits indicate total known visits. Medicaid Low      
                                                                                            
                    |
| Acuity Dx are the number of primary diagnoses on the Medicaid's Low Acuity dx list.       
                                                                                            
                    |
| Recent Inpatient Visit SummaryAdmit Date Facility City State Type Major Type Diagnoses    
                                                                                            
                    |
| or Chief Complaint Dec 27, 2018 [facility] [place] Recovery  Inpatient        
                                                                                            
                    |
| Peripheral arterial disease, osteomyelitis left foot    Other acute osteomyelitis, left   
                                                                                            
                    |
| ankle and foot    Long term (current) use of antibiotics    End stage renal disease       
                                                                                            
                    |
| Anemia in chronic kidney disease  Dec 5, 2018 [facility]      
                                                                                            
                    |
| Medicine  Inpatient    Peripheral vascular disease, unspecified    End stage renal        
                                                                                            
                    |
| disease    Dependence on renal dialysis    Long term (current) use of insulin    Other    
                                                                                            
                    |
| chronic osteomyelitis, left ankle and foot    Type 2 diabetes mellitus with diabetic      
                                                                                            
                    |
| chronic kidney disease    Essential (primary) hypertension  2018 [facility]  
                                                                                            
                    |
|  [place] Inpatient  Inpatient    Upper GIB    Other chronic osteomyelitis,         
                                                                                            
                    |
| unspecified ankle and foot    End stage renal disease    Other specified personal risk    
                                                                                            
                    |
| factors, not elsewhere classified    Chronic systolic (congestive) heart failure          
 
                                                                                            
                    |
| Anemia in chronic kidney disease    Other disorders of plasma-protein metabolism, not     
                                                                                            
                    |
| elsewhere classified    Moderate protein-calorie malnutrition    Other disorders of       
                                                                                            
                    |
| phosphorus metabolism  2018 [facility] [place] Medical Surgical        
                                                                                            
                    |
| Inpatient    1. Type 2 diabetes mellitus with other specified complication    2. Urinary  
                                                                                            
                    |
|  tract infection, site not specified    3. Unspecified protein-calorie malnutrition       
                                                                                            
                    |
| 4. Other chronic osteomyelitis, unspecified site    5. Hypertensive heart and chronic     
                                                                                            
                    |
| kidney disease with heart failure and with stage 5 chronic kidney disease, or end stage   
                                                                                            
                    |
| renal disease    6. Chronic diastolic (congestive) heart failure    7. Other              
                                                                                            
                    |
| osteomyelitis, ankle and foot    8. Type 2 diabetes mellitus with foot ulcer    9.        
                                                                                            
                    |
| Atherosclerotic heart disease of native coronary artery without angina pectoris    10.    
                                                                                            
                    |
| Chronic obstructive pulmonary disease, unspecified  2018 Suzanne CHRISTIANSON       
                                                                                            
                    |
| [facility] [place] [facility]  Inpatient    1. Benign paroxysmal vertigo, unspecified ear    
                                                                                            
                    |
|   2. End stage renal disease    3. Hypertensive chronic kidney disease with stage 5       
                                                                                            
                    |
| chronic kidney disease or end stage renal disease    4. Urinary tract infection, site     
                                                                                            
                    |
| not specified    5. Hypertensive heart and chronic kidney disease with heart failure and  
                                                                                            
                    |
|  with stage 5 chronic kidney disease, or end stage renal disease    6. Kidney transplant  
                                                                                            
                    |
|  status    7. Unspecified systolic (congestive) heart failure    8. Syncope and collapse  
                                                                                            
                    |
|     9. Type 2 diabetes mellitus with diabetic chronic kidney disease    10.               
                                                                                            
                    |
| Atherosclerotic heart disease of native coronary artery without angina pectoris           
                                                                                            
                    |
| Prescription Drug Report (12 Mo.)PDMP query found no report.[facility]    
 
                                                                                            
                    |
| Type Phone Fax Service Dates HEADINGS, PILO HENDERSON.N.P. Nurse Practitioner: Family   Current  
                                                                                            
                    |
|   [facility] [phone]  Oct 1, 2016 - Current  [facility]       
                                                                                            
                    |
| [facility]  [phone] Current  [facility]       
                                                                                            
                    |
| Primary Care   Current  [facility] [phone]         
                                                                                            
                    |
| [phone] Current  [facility] Case or Care Manager   Current  Willard Ortiz,  
                                                                                            
                    |
|  [facility] Case or Care Manager [phone] Current  Jairo Gutierrez MD     
                                                                                            
                    |
| Other   Current  Known AliasesNo known aliases. Criteria met  Care Guidelines  10 in 12   
                                                                                            
                    |
|  3 in 60  2 in 2The above information is provided for the sole purpose of patient         
                                                                                            
                    |
| treatment. Use of this information beyond the terms of Data Sharing Memorandum of         
                                                                                            
                    |
| Understanding and License Agreement is prohibited. In certain cases not all visits may    
                                                                                            
                    |
| be represented. Consult the aforementioned facilities for additional information. 2019    
                                                                                            
                    |
| [employer] - [place], [place] -                              
                                                                                            
                    |
| [email or web address]                                                            
                                                                                            
                    |
|   Other acute osteomyelitis, left ankle and foot                                          
                                                                                            
                    |
|   Long term (current) use of antibiotics                                                  
                                                                                            
                    |
|   End stage renal disease                                                                 
                                                                                            
                    |
|   Anemia in chronic kidney disease                                                        
                                                                                            
                    |
|                                                                                           
                                                                                            
                    |
|[facility] [place] [facility]                     
                                                                                            
                    |
|  Peripheral vascular disease, unspecified                                                 
 
                                                                                            
                    |
|   End stage renal disease                                                                 
                                                                                            
                    |
|   Dependence on renal dialysis                                                            
                                                                                            
                    |
|   Long term (current) use of insulin                                                      
                                                                                            
                    |
|   Other chronic osteomyelitis, left ankle and foot                                        
                                                                                            
                    |
|   Type 2 diabetes mellitus with diabetic chronic kidney disease                           
                                                                                            
                    |
|   Essential (primary) hypertension                                                        
                                                                                            
                    |
|                                                                                           
                                                                                            
                    |
|[facility] [place] [facility]  Inpatient                           
                                                                                            
                    |
|  Upper GIB                                                                                
                                                                                            
                    |
|   Other chronic osteomyelitis, unspecified ankle and foot                                 
                                                                                            
                    |
|   End stage renal disease                                                                 
                                                                                            
                    |
|   Other specified personal risk factors, not elsewhere classified                         
                                                                                            
                    |
|   Chronic systolic (congestive) heart failure                                             
                                                                                            
                    |
|   Anemia in chronic kidney disease                                                        
                                                                                            
                    |
|   Other disorders of plasma-protein metabolism, not elsewhere classified                  
                                                                                            
                    |
|   Moderate protein-calorie malnutrition                                                   
                                                                                            
                    |
|   Other disorders of phosphorus metabolism                                                
                                                                                            
                    |
|                                                                                           
                                                                                            
                    |
|2018 [facility]. [place] [facility]  Inpatient                     
                                                                                            
                    |
|  1. Type 2 diabetes mellitus with other specified complication                            
 
                                                                                            
                    |
|   2. Urinary tract infection, site not specified                                          
                                                                                            
                    |
|   3. Unspecified protein-calorie malnutrition                                             
                                                                                            
                    |
|   4. Other chronic osteomyelitis, unspecified site                                        
                                                                                            
                    |
|   5. 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
onic kidney disease, or end stage renal disease                                             
                    |
|   6. Chronic diastolic (congestive) heart failure                                         
                                                                                            
                    |
|   7. Other osteomyelitis, ankle and foot                                                  
                                                                                            
                    |
|   8. Type 2 diabetes mellitus with foot ulcer                                             
                                                                                            
                    |
|   9. Atherosclerotic heart disease of native coronary artery without angina pectoris      
                                                                                            
                    |
|   10. Chronic obstructive pulmonary disease, unspecified                                  
                                                                                            
                    |
|                                                                                           
                                                                                            
                    |
|2018 [facility]. [place] [facility]  Inpatient                      
                                                                                            
                    |
|  1. Benign paroxysmal vertigo, unspecified ear                                            
                                                                                            
                    |
|   2. End stage renal disease                                                              
                                                                                            
                    |
|   3. Hypertensive chronic kidney disease with stage 5 chronic kidney disease or end stage 
renal disease                                                                               
                    |
|   4. Urinary tract infection, site not specified                                          
                                                                                            
                    |
|   5. 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
onic kidney disease, or end stage renal disease                                             
                    |
|   6. Kidney transplant status                                                             
                                                                                            
                    |
|   7. Unspecified systolic (congestive) heart failure                                      
                                                                                            
                    |
|   8. Syncope and collapse                                                                 
                                                                                            
                    |
|   9. Type 2 diabetes mellitus with diabetic chronic kidney disease                        
 
                                                                                            
                    |
|   10. Atherosclerotic heart disease of native coronary artery without angina pectoris     
                                                                                            
                    |
|                                                                                           
                                                                                            
                    |
|                                                                                           
                                                                                            
                    |
|                                                                                           
                                                                                            
                    |
|Prescription Drug Report (12 Mo.)                                                          
                                                                                            
                    |
|PDMP query found no report.                                                                
                                                                                            
                    |
|                                                                                           
                                                                                            
                    |
|Care Providers                                                                             
                                                                                            
                    |
|Provider PRC Type Phone Fax Service Dates                                                  
                                                                                            
                    |
|HEADINGS, ELZA HENDERSON Nurse Practitioner: Family   Current                                
                                                                                            
                    |
|[facility] [phone]  Oct 1, 2016 - Current                          
                                                                                            
                    |
|[facility]  [phone] Current                               
                                                                                            
                    |
|[facility]   Current                                
                                                                                            
                    |
|[facility] [phone] Current                 
                                                                                            
                    |
|[facility] Case or Care Manager   Current                                                   
                                                                                            
                    |
|MIRTA Goel Case or Care Manager [phone] Current
                                                                                            
                    |
|Jairo Gutierrez MD Other   Current                                                       
                                                                                            
                    |
|                                                                                           
                                                                                            
                    |
|Known Aliases                                                                              
                                                                                            
                    |
|No known aliases.                                                                          
 
                                                                                            
                    |
|Criteria met                                                                               
                                                                                            
                    |
|                                                                                           
                                                                                            
                    |
|  Care Guidelines                                                                          
                                                                                            
                    |
|  10 in 12                                                                                 
                                                                                            
                    |
|  3 in 60                                                                                  
                                                                                            
                    |
|  2 in 2                                                                                   
                                                                                            
                    |
|                                                                                           
                                                                                            
                    |
|The above information is provided for the sole purpose of patient treatment. Use of this in
formation beyond the terms of Data Sharing Memorandum of Understanding and License Agreement
 is prohibited. In  |
|certain cases not all visits may be represented. Consult the aforementioned facilities for 
additional information.                                                                     
                    |
|2019 [employer] - [place], [place] - [email or web address]                                                                                       
                    |
+-------------------------------------------------------------------------------------------
--------------------------------------------------------------------------------------------
--------------------+
 
 
 
+-------------------+---------+--------------------+--------------+
| Performing        | Address | City/State/Zipcode | Phone Number |
| Organization      |         |                    |              |
+-------------------+---------+--------------------+--------------+
|   ED INFORMATION  |         |                    |              |
| EXCHANGE          |         |                    |              |
+-------------------+---------+--------------------+--------------+
 EKG 12 LEAD UNIT PERFORMED (2019  9:44 AM)
 
+-------------------+--------------------------+-----------+--------------+
| Component         | Value                    | Ref Range | Performed At |
+-------------------+--------------------------+-----------+--------------+
| Ventricular Rate  | 93                       | BPM       | [facility] EKG     |
+-------------------+--------------------------+-----------+--------------+
| Atrial Rate       | 93                       | BPM       | [facility] EKG     |
+-------------------+--------------------------+-----------+--------------+
| P-R Interval      | 136                      | ms        | [facility]KG     |
+-------------------+--------------------------+-----------+--------------+
| QRS Duration      | 126                      | ms        | [facility] EKG     |
+-------------------+--------------------------+-----------+--------------+
| Q-T Interval      | 416                      | ms        | [facility] EKG     |
+-------------------+--------------------------+-----------+--------------+
 
| QTC Calculation   | 517                      | ms        | [facility] EKG     |
| (Bezet)           |                          |           |              |
+-------------------+--------------------------+-----------+--------------+
| Calculated P Axis | 84                       | degrees   | [facility] EKG     |
+-------------------+--------------------------+-----------+--------------+
| Calculated R Axis | -30                      | degrees   | [facility]     |
+-------------------+--------------------------+-----------+--------------+
| Calculated T Axis | 37                       | degrees   | [facility]     |
+-------------------+--------------------------+-----------+--------------+
| Diagnosis         | Normal sinus rhythmLeft  |           | [facility] EKG     |
|                   | axis                     |           |              |
|                   | deviationNon-specific    |           |              |
|                   | intra-ventricular        |           |              |
|                   | conduction blockCannot   |           |              |
|                   | rule out Septal infarct  |           |              |
|                   | , age                    |           |              |
|                   | undeterminedAbnormal     |           |              |
|                   | ECGWhen compared with    |           |              |
|                   | ECG of 2019       |           |              |
|                   | 16:30,QRS duration has   |           |              |
|                   | decreasedMinimal         |           |              |
|                   | criteria for Septal      |           |              |
|                   | infarct are now PresentT |           |              |
|                   |  wave inversion now      |           |              |
|                   | evident in Lateral       |           |              |
|                   | leadsThis ECG contains   |           |              |
|                   | Unconfirmed              |           |              |
|                   | Interpretation           |           |              |
|                   | Statements.    See ED    |           |              |
|                   | Record for Physician     |           |              |
|                   | Interpretation.          |           |              |
|                   | Confirmed by MUSE READ   |           |              |
|                   | ONLY, -COMPUTER ([id number]),   |           |              |
|                   |  Sherman Grissom   |           |              |
|                   | Samm ([id number]) on 2019  |           |              |
|                   | 3:51:40 AM               |           |              |
+-------------------+--------------------------+-----------+--------------+
 
 
 
+--------------+-------------------+--------------------+--------------+
| Performing   | Address           | City/State/Zipcode | Phone Number |
| Organization |                   |                    |              |
+--------------+-------------------+--------------------+--------------+
|   [facility]   |   [address] | [place] [address] |              |
+--------------+-------------------+--------------------+--------------+
 in this encounter
 
 Visit Diagnoses
 
 
+---------------------------------------------------------------------+
| Diagnosis                                                           |
+---------------------------------------------------------------------+
|   Hx of CABG - Primary                                              |
+---------------------------------------------------------------------+
|   Postsurgical aortocoronary bypass status                          |
+---------------------------------------------------------------------+
|   Chest pain, unspecified type                                      |
+---------------------------------------------------------------------+
 
|   S/P MVR (mitral valve repair)                                     |
+---------------------------------------------------------------------+
|   Other postprocedural status                                       |
+---------------------------------------------------------------------+
|   Elevated blood pressure reading                                   |
+---------------------------------------------------------------------+
|   Elevated blood pressure reading without diagnosis of hypertension |
+---------------------------------------------------------------------+
|   Chronic systolic congestive heart failure (HCC)                   |
+---------------------------------------------------------------------+
|   Chronic systolic heart failure                                    |
+---------------------------------------------------------------------+
 
 
 
 Admitting Diagnoses
 
 
+---------------------------------------------------------------------+
| Diagnosis                                                           |
+---------------------------------------------------------------------+
|   Elevated blood pressure reading                                   |
+---------------------------------------------------------------------+
|   Elevated blood pressure reading without diagnosis of hypertension |
+---------------------------------------------------------------------+
|   Chronic systolic congestive heart failure (HCC)                   |
+---------------------------------------------------------------------+
|   Chronic systolic heart failure                                    |
+---------------------------------------------------------------------+
|   S/P MVR (mitral valve repair)                                     |
+---------------------------------------------------------------------+
|   Other postprocedural status                                       |
+---------------------------------------------------------------------+
|   Hx of CABG                                                        |
+---------------------------------------------------------------------+
|   Postsurgical aortocoronary bypass status                          |
+---------------------------------------------------------------------+
|   Chest pain in adult                                               |
+---------------------------------------------------------------------+
|   Chest pain, unspecified type                                      |
+---------------------------------------------------------------------+
 
 
 
 Administered Medications
 
 
+------------------+--------+---------+------+------+------+
| Medication Order | MAR    | Action  | Dose | Rate | Site |
|                  | Action | Date    |      |      |      |
+------------------+--------+---------+------+------+------+
 
 
 
+-----------------------------------+---+
|   acetaminophen (TYLENOL)         |   |
| suppository 650 mg  650 mg,       |   |
| Rectal, Every 6 Hours PRN, Mild   |   |
| Pain (1-3), Fever, Starting Wed   |   |
| 19 at 1351                   |   |
 
+-----------------------------------+---+
|                                   |   |
+-----------------------------------+---+
|   acetaminophen (TYLENOL) tablet  |   |
| 650 mg  650 mg, Oral, Every 6     |   |
| Hours PRN, Mild Pain (1-3),       |   |
| Fever, Starting 19 at    |   |
| 1351                              |   |
+-----------------------------------+---+
|                                   |   |
+-----------------------------------+---+
 
 
 
+-----------------------------------+-------+----------+--------+---+---+
|   apixaban (ELIQUIS) tablet 2.5   | Given |  | 2.5 mg |   |   |
| mg  2.5 mg, Oral, 2 Times Daily,  |       | 9 11:48  |        |   |   |
| First dose on 19 at 1430 |       | PST      |        |   |   |
+-----------------------------------+-------+----------+--------+---+---+
 
 
 
+-------+----------+--------+---+---+
| Given |  | 2.5 mg |   |   |
|       | 9 21:53  |        |   |   |
|       | PST      |        |   |   |
+-------+----------+--------+---+---+
| Given |  | 2.5 mg |   |   |
|       | 9 08:15  |        |   |   |
|       | PST      |        |   |   |
+-------+----------+--------+---+---+
 
 
 
+---+---+
|   |   |
+---+---+
 
 
 
+-----------------------------------+-------+----------+-------+---+---+
|   atorvastatin (LIPITOR) tablet   | Given |  | 40 mg |   |   |
| 40 mg  40 mg, Oral, Nightly,      |       | 9 22:44  |       |   |   |
| First dose on 19 at 2200 |       | PST      |       |   |   |
+-----------------------------------+-------+----------+-------+---+---+
 
 
 
+-------+----------+-------+---+---+
| Given |  | 40 mg |   |   |
|       | 9 21:53  |       |   |   |
|       | PST      |       |   |   |
+-------+----------+-------+---+---+
 
 
 
+---+---+
|   |   |
+---+---+
 
 
 
 
+----------------------------------+-------+----------+--------+---+---+
|   calcium acetate (PHOSLO)       | Given |  | 667 mg |   |   |
| capsule 667 mg  667 mg, Oral, 3  |       | 9 16:18  |        |   |   |
| Times Daily With Meals, First    |       | PST      |        |   |   |
| dose on 19 at 1700      |       |          |        |   |   |
+----------------------------------+-------+----------+--------+---+---+
 
 
 
+-------+----------+--------+---+---+
| Given |  | 667 mg |   |   |
|       | 9 08:15  |        |   |   |
|       | PST      |        |   |   |
+-------+----------+--------+---+---+
| Given |  | 667 mg |   |   |
|       | 9 12:25  |        |   |   |
|       | PST      |        |   |   |
+-------+----------+--------+---+---+
 
 
 
+---+---+
|   |   |
+---+---+
 
 
 
+----------------------------------+-------+----------+----------+---+---+
|   calcium carbonate (TUMS)       | Given |  | 1,000 mg |   |   |
| chewable tablet 1,000 mg  1,000  |       | 9 11:47  |          |   |   |
| mg, Oral, Every 48 Hours, First  |       | PST      |          |   |   |
| dose on u 19 at 0830      |       |          |          |   |   |
+----------------------------------+-------+----------+----------+---+---+
 
 
 
+---+---+
|   |   |
+---+---+
 
 
 
+-----------------------------------+-------+----------+---------+---+---+
|   carvedilol (COREG) tablet 12.5  | Given |  | 12.5 mg |   |   |
| mg  12.5 mg, Oral, 2 Times Daily  |       | 9 16:18  |         |   |   |
| With Meals, First dose on Thu     |       | PST      |         |   |   |
| 19 at 1700                   |       |          |         |   |   |
+-----------------------------------+-------+----------+---------+---+---+
 
 
 
+-------+----------+---------+---+---+
| Given |  | 12.5 mg |   |   |
|       | 9 08:14  |         |   |   |
|       | PST      |         |   |   |
+-------+----------+---------+---+---+
 
 
 
 
+---+---+
|   |   |
+---+---+
 
 
 
+-----------------------------------+-------+----------+----------+---+---+
|   carvedilol (COREG) tablet 3.125 | Given |  | 3.125 mg |   |   |
|  mg  3.125 mg, Oral, 2 Times      |       | 9 16:59  |          |   |   |
| Daily With Meals, First dose on   |       | PST      |          |   |   |
| Wed 19 at 1700               |       |          |          |   |   |
+-----------------------------------+-------+----------+----------+---+---+
 
 
 
+---+---+
|   |   |
+---+---+
 
 
 
+-----------------------------------+-------+----------+-----+-------+---+
|   cefTAZidime (FORTAZ) 2 g in     | Given |  | 2 g | 100   |   |
| sodium chloride (IV) 0.9 % 50 mL  |       | 9 13:47  |     | mL/hr |   |
| IVPB  2 g, Intravenous,           |       | PST      |     |       |   |
| Administer over 30 Minutes, After |       |          |     |       |   |
|  Hemodialysis (see admin          |       |          |     |       |   |
| instructions), Starting Thu       |       |          |     |       |   |
| 19 at 0000, Please send MAR  |       |          |     |       |   |
| message to request dose on        |       |          |     |       |   |
| dialysis days                     |       |          |     |       |   |
+-----------------------------------+-------+----------+-----+-------+---+
 
 
 
+---+---+
|   |   |
+---+---+
 
 
 
+-----------------------------------+-------+----------+--------+---+---+
|   cholecalciferol (VITAMIN D-3)   | Given |  | 1,000  |   |   |
| tablet 1,000 Units  1,000 Units,  |       | 9 15:25  | Units  |   |   |
| Oral, Daily, First dose on Wed    |       | PST      |        |   |   |
| 19 at 1430                   |       |          |        |   |   |
+-----------------------------------+-------+----------+--------+---+---+
 
 
 
+-------+----------+--------+---+---+
| Given |  | 1,000  |   |   |
|       | 9 11:48  | Units  |   |   |
|       | PST      |        |   |   |
+-------+----------+--------+---+---+
| Given |  | 1,000  |   |   |
|       | 9 08:16  | Units  |   |   |
|       | PST      |        |   |   |
+-------+----------+--------+---+---+
 
 
 
 
+---+---+
|   |   |
+---+---+
 
 
 
+-----------------------------------+-------+----------+-------+---+---+
|   clopidogrel (PLAVIX) tablet 75  | Given |  | 75 mg |   |   |
| mg  75 mg, Oral, Daily, First     |       | 9 15:24  |       |   |   |
| dose on Wed 19 at 1430       |       | PST      |       |   |   |
+-----------------------------------+-------+----------+-------+---+---+
 
 
 
+-------+----------+-------+---+---+
| Given |  | 75 mg |   |   |
|       | 9 11:47  |       |   |   |
|       | PST      |       |   |   |
+-------+----------+-------+---+---+
| Given |  | 75 mg |   |   |
|       | 9 08:16  |       |   |   |
|       | PST      |       |   |   |
+-------+----------+-------+---+---+
 
 
 
+---+---+
|   |   |
+---+---+
 
 
 
+-----------------------------------+-------+----------+-------+---+---+
|   diphenhydrAMINE (BENADRYL)      | Given |  | 25 mg |   |   |
| capsule 25 mg  25 mg, Oral, Every |       | 9 10:58  |       |   |   |
|  6 Hours PRN, Itching, Starting   |       | PST      |       |   |   |
| Fri 19 at 1020               |       |          |       |   |   |
+-----------------------------------+-------+----------+-------+---+---+
 
 
 
+---+---+
|   |   |
+---+---+
 
 
 
+-----------------------------------+-------+----------+-------+---+---+
|   furosemide (LASIX) tablet 20 mg | Given |  | 20 mg |   |   |
|   20 mg, Oral, Daily, First dose  |       | 9 15:25  |       |   |   |
| on Wed 19 at 1430            |       | PST      |       |   |   |
+-----------------------------------+-------+----------+-------+---+---+
 
 
 
+---+---+
|   |   |
 
+---+---+
 
 
 
+-----------------------------------+-------+----------+----------+---+---+
|   HYDROcodone-acetaminophen       | Given |  | 1 tablet |   |   |
| (NORCO) 5-325 MG per tablet 1     |       | 9 17:33  |          |   |   |
| tablet  1 tablet, Oral, Every 4   |       | PST      |          |   |   |
| Hours PRN, Moderate Pain (4-6),   |       |          |          |   |   |
| Severe Pain (7-10), Starting Wed  |       |          |          |   |   |
| 19 at 1350                   |       |          |          |   |   |
+-----------------------------------+-------+----------+----------+---+---+
 
 
 
+-------+----------+----------+---+---+
| Given |  | 1 tablet |   |   |
|       | 9 21:53  |          |   |   |
|       | PST      |          |   |   |
+-------+----------+----------+---+---+
| Given |  | 1 tablet |   |   |
|       | 9 05:42  |          |   |   |
|       | PST      |          |   |   |
+-------+----------+----------+---+---+
 
 
 
+---+---+
|   |   |
+---+---+
 
 
 
+-----------------------------------+-------+----------+---------+---+---+
|   HYDROcodone-acetaminophen       | Given |  | 2       |   |   |
| (NORCO) 5-325 MG per tablet 2     |       | 9 11:32  | tablets |   |   |
| tablet  2 tablet, Oral, Once, Wed |       | PST      |         |   |   |
|  19 at 1130, For 1 dose      |       |          |         |   |   |
+-----------------------------------+-------+----------+---------+---+---+
 
 
 
+---+---+
|   |   |
+---+---+
 
 
 
+-----------------------------------+-------+----------+----------+---+---+
|   insulin detemir (LEVEMIR)       | Given |  | 10 Units |   |   |
| injection 10 Units  10 Units,     |       | 9 22:43  |          |   |   |
| Subcutaneous, Nightly, First dose |       | PST      |          |   |   |
|  on 19 at 2200           |       |          |          |   |   |
+-----------------------------------+-------+----------+----------+---+---+
 
 
 
+-------+----------+----------+---+---+
| Given |  | 10 Units |   |   |
|       | 9 21:54  |          |   |   |
 
|       | PST      |          |   |   |
+-------+----------+----------+---+---+
 
 
 
+---+---+
|   |   |
+---+---+
 
 
 
+-----------------------------------+-------+----------+---------+---+---+
|   insulin lispro (human)          | Given |  | 1 Units |   |   |
| (HUMALOG) injection 0-3 Units     |       | 9 22:43  |         |   |   |
| 0-3 Units, Subcutaneous, Nightly, |       | PST      |         |   |   |
|  First dose on 19 at     |       |          |         |   |   |
| 2200                              |       |          |         |   |   |
+-----------------------------------+-------+----------+---------+---+---+
 
 
 
+---+---+
|   |   |
+---+---+
 
 
 
+-----------------------------------+-------+----------+---------+---+---+
|   insulin lispro (human)          | Given |  | 1 Units |   |   |
| (HUMALOG) injection 0-6 Units     |       | 9 05:38  |         |   |   |
| 0-6 Units, Subcutaneous, 3 Times  |       | PST      |         |   |   |
| Daily Before Meals, First dose on |       |          |         |   |   |
|  Wed 19 at 1630              |       |          |         |   |   |
+-----------------------------------+-------+----------+---------+---+---+
 
 
 
+-------+----------+---------+---+---+
| Given |  | 2 Units |   |   |
|       | 9 12:15  |         |   |   |
|       | PST      |         |   |   |
+-------+----------+---------+---+---+
 
 
 
+---+---+
|   |   |
+---+---+
 
 
 
+-----------------------------------+-------+----------+-------+---+---+
|   isosorbide mononitrate (IMDUR)  | Given |  | 60 mg |   |   |
| 24 hr tablet 60 mg  60 mg, Oral,  |       | 9 15:24  |       |   |   |
| Daily, First dose on 19  |       | PST      |       |   |   |
| at 1430                           |       |          |       |   |   |
+-----------------------------------+-------+----------+-------+---+---+
 
 
 
 
+-------+----------+-------+---+---+
| Given |  | 60 mg |   |   |
|       | 9 07:34  |       |   |   |
|       | PST      |       |   |   |
+-------+----------+-------+---+---+
| Given |  | 60 mg |   |   |
|       | 9 08:15  |       |   |   |
|       | PST      |       |   |   |
+-------+----------+-------+---+---+
 
 
 
+---+---+
|   |   |
+---+---+
 
 
 
+-----------------------------------+-------+----------+------+---+---+
|   LORazepam (ATIVAN) tablet 1 mg  | Given |  | 1 mg |   |   |
|  1 mg, Oral, Daily PRN, Anxiety,  |       | 9 22:51  |      |   |   |
| Starting 19 at 1350      |       | PST      |      |   |   |
+-----------------------------------+-------+----------+------+---+---+
 
 
 
+-------+----------+------+---+---+
| Given |  | 1 mg |   |   |
|       | 9 07:31  |      |   |   |
|       | PST      |      |   |   |
+-------+----------+------+---+---+
 
 
 
+---+---+
|   |   |
+---+---+
 
 
 
+-----------------------------------+-------+----------+------+---+---+
|   LORazepam (ATIVAN) tablet 1 mg  | Given |  | 1 mg |   |   |
|  1 mg, Oral, Once, Wed 19 at |       | 9 13:23  |      |   |   |
|  1323, For 1 dose                 |       | PST      |      |   |   |
+-----------------------------------+-------+----------+------+---+---+
 
 
 
+---+---+
|   |   |
+---+---+
 
 
 
+-----------------------------------+-------+----------+------+---+---+
|   LORazepam (ATIVAN) tablet 1 mg  | Given |  | 1 mg |   |   |
|  1 mg, Oral, Every 6 Hours PRN,   |       | 9 16:34  |      |   |   |
| Anxiety, Starting Thu 19 at  |       | PST      |      |   |   |
| 1200                              |       |          |      |   |   |
+-----------------------------------+-------+----------+------+---+---+
 
 
 
 
+-------+----------+------+---+---+
| Given |  | 1 mg |   |   |
|       | 9 13:15  |      |   |   |
|       | PST      |      |   |   |
+-------+----------+------+---+---+
 
 
 
+---+---+
|   |   |
+---+---+
 
 
 
+-----------------------------------+-------+----------+-------+---+---+
|   losartan (COZAAR) tablet 25 mg  | Given |  | 25 mg |   |   |
|  25 mg, Oral, Daily, First dose   |       | 9 15:24  |       |   |   |
| on Wed 19 at 1430            |       | PST      |       |   |   |
+-----------------------------------+-------+----------+-------+---+---+
 
 
 
+-------+----------+-------+---+---+
| Given |  | 25 mg |   |   |
|       | 9 11:48  |       |   |   |
|       | PST      |       |   |   |
+-------+----------+-------+---+---+
| Given |  | 25 mg |   |   |
|       | 9 08:15  |       |   |   |
|       | PST      |       |   |   |
+-------+----------+-------+---+---+
 
 
 
+---+---+
|   |   |
+---+---+
 
 
 
+-----------------------------------+-------+----------+-------+---+---+
|   magnesium chloride (MAG64) EC   | Given |  | 64 mg |   |   |
| tablet 64 mg  64 mg (1 tablet),   |       | 9 11:47  |       |   |   |
| Oral, Every 48 Hours, First dose  |       | PST      |       |   |   |
| on Thu 19 at 0830            |       |          |       |   |   |
+-----------------------------------+-------+----------+-------+---+---+
 
 
 
+---+---+
|   |   |
+---+---+
 
 
 
+-----------------------------------+-------+----------+--------+---+---+
|   nitroGLYCERIN (NITRO-BID) 2 %   | Given |  | 1 inch |   |   |
 
| ointment 1 inch  1 inch, Topical, |       | 9 16:59  |        |   |   |
|  Every 6 Hours, First dose on Wed |       | PST      |        |   |   |
|  19 at 1107, For 4 doses     |       |          |        |   |   |
+-----------------------------------+-------+----------+--------+---+---+
 
 
 
+-------+----------+--------+---+---+
| Given |  | 1 inch |   |   |
|       | 9 22:51  |        |   |   |
|       | PST      |        |   |   |
+-------+----------+--------+---+---+
| Given |  | 1 inch |   |   |
|       | 9 05:39  |        |   |   |
|       | PST      |        |   |   |
+-------+----------+--------+---+---+
 
 
 
+---+---+
|   |   |
+---+---+
 
 
 
+-----------------------------------+-------+----------+--------+---+---+
|   nitroGLYCERIN (NITROSTAT) SL    | Given |  | 0.4 mg |   |   |
| tablet 0.4 mg  0.4 mg,            |       | 9 09:59  |        |   |   |
| Sublingual, Every 5 Min PRN,      |       | PST      |        |   |   |
| Chest pain, Starting Wed 19  |       |          |        |   |   |
| at 0954, For 3 doses              |       |          |        |   |   |
+-----------------------------------+-------+----------+--------+---+---+
 
 
 
+-------+----------+--------+---+---+
| Given |  | 0.4 mg |   |   |
|       | 9 11:05  |        |   |   |
|       | PST      |        |   |   |
+-------+----------+--------+---+---+
| Given |  | 0.4 mg |   |   |
|       | 9 11:12  |        |   |   |
|       | PST      |        |   |   |
+-------+----------+--------+---+---+
 
 
 
+---+---+
|   |   |
+---+---+
 
 
 
+-----------------------------------+-------+----------+--------+---+---+
|   nitroGLYCERIN (NITROSTAT) SL    | Given |  | 0.4 mg |   |   |
| tablet 0.4 mg  0.4 mg,            |       | 9 13:27  |        |   |   |
| Sublingual, Every 5 Min PRN,      |       | PST      |        |   |   |
| Chest pain, Starting Wed 19  |       |          |        |   |   |
| at 1327, For 3 doses              |       |          |        |   |   |
+-----------------------------------+-------+----------+--------+---+---+
 
 
 
 
+-------+----------+--------+---+---+
| Given |  | 0.4 mg |   |   |
|       | 9 14:48  |        |   |   |
|       | PST      |        |   |   |
+-------+----------+--------+---+---+
| Given |  | 0.4 mg |   |   |
|       | 9 14:54  |        |   |   |
|       | PST      |        |   |   |
+-------+----------+--------+---+---+
 
 
 
+---+---+
|   |   |
+---+---+
 
 
 
+-----------------------------------+-------+----------+--------+---+---+
|   nitroGLYCERIN (NITROSTAT) SL    | Given |  | 0.4 mg |   |   |
| tablet 0.4 mg  0.4 mg,            |       | 9 13:55  |        |   |   |
| Sublingual, Every 5 Min PRN,      |       | PST      |        |   |   |
| Chest pain, Starting Thu 19  |       |          |        |   |   |
| at 1018                           |       |          |        |   |   |
+-----------------------------------+-------+----------+--------+---+---+
 
 
 
+-------+----------+--------+---+---+
| Given |  | 0.4 mg |   |   |
|       | 9 16:34  |        |   |   |
|       | PST      |        |   |   |
+-------+----------+--------+---+---+
| Given |  | 0.4 mg |   |   |
|       | 9 05:39  |        |   |   |
|       | PST      |        |   |   |
+-------+----------+--------+---+---+
 
 
 
+---+---+
|   |   |
+---+---+
 
 
 
+-----------------------------------+-------+----------+-------+---+---+
|   nortriptyline (PAMELOR) capsule | Given |  | 25 mg |   |   |
|  25 mg  25 mg, Oral, Every        |       | 9 08:14  |       |   |   |
| Morning, First dose on Fri        |       | PST      |       |   |   |
| 19 at 0900                   |       |          |       |   |   |
+-----------------------------------+-------+----------+-------+---+---+
 
 
 
+---+---+
|   |   |
 
+---+---+
 
 
 
+-----------------------------------+-------+----------+-------+---+---+
|   nortriptyline (PAMELOR) capsule | Given |  | 75 mg |   |   |
|  75 mg  75 mg, Oral, Nightly,     |       | 9 21:53  |       |   |   |
| First dose on Thu 19 at 2200 |       | PST      |       |   |   |
+-----------------------------------+-------+----------+-------+---+---+
 
 
 
+-----------------------------------+---+
|                                   |   |
+-----------------------------------+---+
|   ondansetron (ZOFRAN) injection  |   |
| 4 mg  4 mg, Intravenous, Every 6  |   |
| Hours PRN, Nausea, Vomiting,      |   |
| Starting Wed 19 at 1351      |   |
+-----------------------------------+---+
|                                   |   |
+-----------------------------------+---+
 
 
 
+-----------------------------------+-------+----------+------+---+---+
|   ondansetron (ZOFRAN-ODT)        | Given |  | 4 mg |   |   |
| disintegrating tablet 4 mg  4 mg, |       | 9 10:21  |      |   |   |
|  Oral, Every 6 Hours PRN, Nausea, |       | PST      |      |   |   |
|  Vomiting, Starting Wed 19   |       |          |      |   |   |
| at 1351                           |       |          |      |   |   |
+-----------------------------------+-------+----------+------+---+---+
 
 
 
+---+---+
|   |   |
+---+---+
 
 
 
+-----------------------------------+-------+----------+--------+---+---+
|   oxybutynin (DITROPAN) tablet    | Given |  | 2.5 mg |   |   |
| 2.5 mg  2.5 mg, Oral, 2 Times     |       | 9 13:46  |        |   |   |
| Daily, First dose on 19  |       | PST      |        |   |   |
| at 1430                           |       |          |        |   |   |
+-----------------------------------+-------+----------+--------+---+---+
 
 
 
+-------+----------+--------+---+---+
| Given |  | 2.5 mg |   |   |
|       | 9 21:54  |        |   |   |
|       | PST      |        |   |   |
+-------+----------+--------+---+---+
| Given |  | 2.5 mg |   |   |
|       | 9 08:14  |        |   |   |
|       | PST      |        |   |   |
+-------+----------+--------+---+---+
 
 
 
 
+---+---+
|   |   |
+---+---+
 
 
 
+-----------------------------------+-------+----------+-------+---+---+
|   pantoprazole (PROTONIX) EC      | Given |  | 40 mg |   |   |
| tablet 40 mg  40 mg, Oral, Every  |       | 9 05:39  |       |   |   |
| Morning Before Breakfast, First   |       | PST      |       |   |   |
| dose on Thu 19 at 0630       |       |          |       |   |   |
+-----------------------------------+-------+----------+-------+---+---+
 
 
 
+-------+----------+-------+---+---+
| Given |  | 40 mg |   |   |
|       | 9 05:42  |       |   |   |
|       | PST      |       |   |   |
+-------+----------+-------+---+---+
 
 
 
+---+---+
|   |   |
+---+---+
 
 
 
+-----------------------------------+-------+----------+---------+---+---+
|   rOPINIRole (REQUIP) tablet 0.75 | Given |  | 0.75 mg |   |   |
|  mg  0.75 mg, Oral, Nightly,      |       | 9 22:43  |         |   |   |
| First dose on Wed 19 at 2200 |       | PST      |         |   |   |
+-----------------------------------+-------+----------+---------+---+---+
 
 
 
+-------+----------+---------+---+---+
| Given |  | 0.75 mg |   |   |
|       | 9 21:53  |         |   |   |
|       | PST      |         |   |   |
+-------+----------+---------+---+---+
 
 
 
+---+---+
|   |   |
+---+---+
 
 
 
+-----------------------------------+-------+----------+--------+-------+---+
|   vancomycin (VANCOCIN) 500 mg in | Given |  | 500 mg | 100   |   |
|  sodium chloride (IV) 0.9 % 100   |       | 9 11:02  |        | mL/hr |   |
| mL IVPB  500 mg, Intravenous,     |       | PST      |        |       |   |
| Administer over 60 Minutes, Once, |       |          |        |       |   |
|  Thu 19 at 1100, For 1 dose, |       |          |        |       |   |
|  After dialysis on HD days        |       |          |        |       |   |
 
+-----------------------------------+-------+----------+--------+-------+---+
 
 
 
+---+---+
|   |   |
+---+---+
 in this encounter

## 2019-04-03 NOTE — XMS
Encounter Summary
  Created on: 2019
 
 Cherie Villalobos
 External Reference #: [id number]
 : 48
 Sex: Female
 
 Demographics
 
 
+-----------------------+--------------------------+
| Address               | [address]               |
|                       | [place]  [address] |
+-----------------------+--------------------------+
| Home Phone            | [phone]          |
+-----------------------+--------------------------+
| Preferred Language    | Unknown                  |
+-----------------------+--------------------------+
| Marital Status        |                   |
+-----------------------+--------------------------+
| Roman Catholic Affiliation | 1013                     |
+-----------------------+--------------------------+
| Race                  | Unknown                  |
+-----------------------+--------------------------+
| Ethnic Group          | Unknown                  |
+-----------------------+--------------------------+
 
 
 Author
 
 
+--------------+-----------------------+
| Author       | [employer] |
+--------------+-----------------------+
| Organization | [facility] |
+--------------+-----------------------+
| Address      | Unknown               |
+--------------+-----------------------+
| Phone        | Unavailable           |
+--------------+-----------------------+
 
 
 
 Support
 
 
+---------------+--------------+---------------------+-----------------+
| Name          | Relationship | Address             | Phone           |
+---------------+--------------+---------------------+-----------------+
| Anoop Villalobos | ECON         | [place], | [phone] |
|               |              |  [place]  [address]     |                 |
+---------------+--------------+---------------------+-----------------+
| Jennifer Dickson | ECON         | [place]       | [phone] |
|               |              | [id number]               |                 |
+---------------+--------------+---------------------+-----------------+
 
 
 
 
 Care Team Providers
 
 
+-----------------------+------+-----------------+
| Care Team Member Name | Role | Phone           |
+-----------------------+------+-----------------+
| Ivy Couch DO      | PCP  | [phone] |
+-----------------------+------+-----------------+
 
 
 
 Reason for Visit
 
 
+-----------+-------------------------------------------------------------------+
| Reason    | Comments                                                          |
+-----------+-------------------------------------------------------------------+
| Follow-up | Patient presents today for her 1st post op amputation of the left |
|           |  toes. Patient states she is doing well, she states she is ready  |
|           | to go home from the care facility.                                |
+-----------+-------------------------------------------------------------------+
 
 
 
 Encounter Details
 
 
+--------+---------+----------------------+----------------------+----------------------+
| Date   | Type    | Department           | Care Team            | Description          |
+--------+---------+----------------------+----------------------+----------------------+
| 01/10/ | Office  |   [facility] |   Srinivasan Jordan,  | S/P amputation of    |
| 2019   | Visit   |  and Ankle  [address]      | DPM  [address], | foot, left (HCC)     |
|        |         | [address]   |  [address]  [place],  | (Primary Dx); Type 2 |
|        |         | [place], [place]         | [place] [address]             |  diabetes mellitus   |
|        |         | [address]           | [phone]         | with chronic kidney  |
|        |         | [phone]         | [phone] (Fax)   | disease on chronic   |
|        |         |                      |                      | dialysis, with       |
|        |         |                      |                      | long-term current    |
|        |         |                      |                      | use of insulin (HCC) |
+--------+---------+----------------------+----------------------+----------------------+
 
 
 
 Social History
 
 
+---------------+------------+-----------+--------+------------------+
| Tobacco Use   | Types      | Packs/Day | Years  | Date             |
|               |            |           | Used   |                  |
+---------------+------------+-----------+--------+------------------+
| Former Smoker | Cigarettes | 1         | 30     | Quit: 2004 |
+---------------+------------+-----------+--------+------------------+
 
 
 
+---------------------+---+---+---+
| Smokeless Tobacco:  |   |   |   |
| Never Used          |   |   |   |
+---------------------+---+---+---+
 
 
 
 
+------------------------------+
| Comments: quite smoking  |
+------------------------------+
 
 
 
+-------------+-----------+---------+----------+
| Alcohol Use | Drinks/We | oz/Week | Comments |
|             | ek        |         |          |
+-------------+-----------+---------+----------+
| No          |           |         |          |
+-------------+-----------+---------+----------+
 
 
 
+------------------+---------------+
| Sex Assigned at  | Date Recorded |
| Birth            |               |
+------------------+---------------+
| Not on file      |               |
+------------------+---------------+
 as of this encounter
 
 Last Filed Vital Signs
 
 
+-------------------+---------+-------------------------+
| Vital Sign        | Reading | Time Taken              |
+-------------------+---------+-------------------------+
| Blood Pressure    | 115/54  | 01/10/2019  1:30 PM PST |
+-------------------+---------+-------------------------+
| Pulse             | 59      | 01/10/2019  1:30 PM PST |
+-------------------+---------+-------------------------+
| Temperature       | -       | -                       |
+-------------------+---------+-------------------------+
| Respiratory Rate  | 16      | 01/10/2019  1:30 PM PST |
+-------------------+---------+-------------------------+
| Oxygen Saturation | 93%     | 01/10/2019  1:30 PM PST |
+-------------------+---------+-------------------------+
| Inhaled Oxygen    | -       | -                       |
| Concentration     |         |                         |
+-------------------+---------+-------------------------+
| Weight            | -       | -                       |
+-------------------+---------+-------------------------+
| Height            | -       | -                       |
+-------------------+---------+-------------------------+
| Body Mass Index   | -       | -                       |
+-------------------+---------+-------------------------+
 in this encounter
 
 Progress Notes
 Srinivasan Jordan, DPM - 01/10/2019  1:30 PM PSTFormatting of this note may be different fro
m the original.
  
 Subjective: 
  Patient ID: Cherie Villalobos is a 68 y.o. female.
 Chief Complaint 
 
 Patient presents with 
   Follow-up 
   Patient presents today for her 1st post op amputation of the left toes. Patient states sh
e is doing well, she states she is ready to go home from the care facility.  
  
 
 HPI
 Patient returns today status post TMA left on 18.  Patient's pain is mild well contro
lled.  She think she is ready to return home, but does not have a plan for maintaining nonwe
ightbearing status at home. 
 
 The following portions of the patient's history were reviewed and updated as appropriate an
d is available elsewhere in the record: allergies, current medications, past family history,
 past medical history, past social history, past surgical history and problem list.
 
 ROS
 Denies any nausea, vomiting, fever, chills, shortness of breath, chest pain, or calf pain 
 
     
 Objective: 
 /54 (BP Location: Right upper arm, Patient Position: Sitting)  | Pulse 59  | Resp 16 
 | SpO2 93% 
 General observation:
 Constitutional: The patient is alert and oriented to person, place and time
 Psychiatric: The patient has normal affect and mood; her speech is normal; her behavior is 
normal.
 Respiratory: Effort normal and breath sounds normal. No accessory muscle usage. No respirat
ory distress.   
 
 Lower Extremity Examination:
 Neurovascular status is grossly intact. CFT is immediate to distal foot/toes. No dysvascula
r changes. 
 The incision(s) is(are) well coapted, without evidence of dehiscence - it(they) is(are) radha
an, dry and intact.  No evidence of dehiscence.
 There is no evidence of erythema, cellulitis, lymphangitis, drainage, malodor, or signs of 
infection. Normal skin temperature is appreciated.
 No skin mottling. No hyperesthesias or paresthesias.
 No calf or thigh pain. Negative Homans sign. 
 
 Radiographs: Normal post op appearance.  See epic chart for complete read
 
 X-ray Foot Left
 
 Result Date: 2018
 Amputation of the left forefoot at the level of the proximal metatarsals. Surgical cutaneou
s staples are present. No radiographic evidence for osteomyelitis. Normal alignment of the h
indfoot. Mild degeneration of the midfoot. Electronically signed by Oleksandr Lemus MD on 2018 10:12 AM
 
 Ct Head Without Contrast
 
 Result Date: 2018
 1.  No acute intracranial pathology. Electronically signed by Steven Samano MD on  5:28 PM
 
 X-ray Chest 1 View
 
 Result Date: 2019
 1.  Small loculated pleural fluid collection seen overlying the lateral left heart border i
n the lower left chest.  There may also be a small pleural effusion at the right lung base w
 
hich is layering posteriorly. 2.  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 Electronically s
igned by Eugenio Hoffman DO on 2019 4:59 PM
 
 Angioplasty 18:
 1. Aorta with narrow aortic bifurcation with moderate stenosis of proximal left common errol
c artery. 
 2. Left SFA with moderate stenosis proximally. 
 3. Single vessel runoff to left foot via left anterior tibial artery. 
 4. Left posterior tibial artery and peroneal artery were occluded with reconstitution of di
stal posterior tibial artery at the left ankle via collaterals. 
 5. If forefoot amputation does not heal, may need popliteal to posterior tibial artery bypa
ss versus antegrade access of left common femoral artery with posterior tibial artery recana
lization. 
  
 Electronically signed by Kirt Mclaughlin MD on 2018 10:51 AM 
 
    
 Assessment and Plan: 
 S/p: TMA left on 18
 Post op day/week: 2 week
 
 Radiographs taken, read and reviewed of involved foot/ankle and findings were discussed wit
h the patient
 Dressing change/applied today with dry sterile, mildly compressive dressing
 Weight bearing status: Strict nonweightbearing
 Patient needs to remain at care facility to maintain nonweightbearing status
 Weight with removable cast boot, keep on at all times
 Follow up in 2 weeks for staple removal and recheck
 Continue antibiotic treatment at the direction of the infectious disease service (Dr. Elliott)
 
 Srinivasan Jordan DPM
  in this encounter
 
 Plan of Treatment
 
 
+--------+---------+-----------+----------------------+-------------+
| Date   | Type    | Specialty | Care Team            | Description |
+--------+---------+-----------+----------------------+-------------+
| 04/10/ | Office  | Podiatry  |   Srinivasan Jordan,  |             |
|    | Visit   |           | DPM  [address], |             |
|        |         |           |  [address]  [place],  |             |
|        |         |           | [place] [address]             |             |
|        |         |           | [phone]         |             |
|        |         |           | [phone] (Fax)   |             |
+--------+---------+-----------+----------------------+-------------+
 as of this encounter
 
 Visit Diagnoses
 
 
+-----------------------------------------------------------------------------------+
| Diagnosis                                                                         |
+-----------------------------------------------------------------------------------+
|   S/P amputation of foot, left (HCC) - Primary                                    |
+-----------------------------------------------------------------------------------+
|   Type 2 diabetes mellitus with chronic kidney disease on chronic dialysis, with  |
| long-term current use of insulin (HCC)                                            |
+-----------------------------------------------------------------------------------+

## 2019-04-03 NOTE — XMS
Encounter Summary
  Created on: 2019
 
 Cherie Villalobos
 External Reference #: [id number]
 : 48
 Sex: Female
 
 Demographics
 
 
+-----------------------+--------------------------+
| Address               | [address]               |
|                       | [place]  [address] |
+-----------------------+--------------------------+
| Home Phone            | [phone]          |
+-----------------------+--------------------------+
| Preferred Language    | Unknown                  |
+-----------------------+--------------------------+
| Marital Status        |                   |
+-----------------------+--------------------------+
| Sabianist Affiliation | [id number]                     |
+-----------------------+--------------------------+
| Race                  | Unknown                  |
+-----------------------+--------------------------+
| Ethnic Group          | Unknown                  |
+-----------------------+--------------------------+
 
 
 Author
 
 
+--------------+-----------------------+
| Author       | Sherry Celotor |
+--------------+-----------------------+
| Organization | [facility] |
+--------------+-----------------------+
| Address      | Unknown               |
+--------------+-----------------------+
| Phone        | Unavailable           |
+--------------+-----------------------+
 
 
 
 Support
 
 
+---------------+--------------+---------------------+-----------------+
| Name          | Relationship | Address             | Phone           |
+---------------+--------------+---------------------+-----------------+
| Anoop Villalobos | ECON         | [place], | [phone] |
|               |              |  [place]  [address]     |                 |
+---------------+--------------+---------------------+-----------------+
| Jennifer Dickson | ECON         | [place]       | [phone] |
|               |              | [id number]               |                 |
+---------------+--------------+---------------------+-----------------+
 
 
 
 
 Care Team Providers
 
 
+-----------------------+------+-----------------+
| Care Team Member Name | Role | Phone           |
+-----------------------+------+-----------------+
| Ivy Couch DO      | PCP  | [phone] |
+-----------------------+------+-----------------+
 
 
 
 Reason for Visit
 
 
+-----------+------------------------------------------------------------------+
| Reason    | Comments                                                         |
+-----------+------------------------------------------------------------------+
| Follow-up | Here today for follow up for right toes, left foot. States that  |
|           | there is a spot on the top of the amputation that hasn't fully   |
|           | closed up. Has been having a lot of phantom pain in the right    |
|           | foot. Has been taking tramadol. Pain 4/10 currently can get to   |
|           | 8/10.                                                            |
+-----------+------------------------------------------------------------------+
 
 
 
 Encounter Details
 
 
+--------+---------+----------------------+----------------------+----------------------+
| Date   | Type    | Department           | Care Team            | Description          |
+--------+---------+----------------------+----------------------+----------------------+
| / | Office  |   [facility] Foot |   Srinivasan Jordan,  | Closed nondisplaced  |
| 2019   | Visit   |  and Ankle  [address]      | DPM  [address], | fracture of distal   |
|        |         | [address]   |  [address]  [place],  | phalanx of right     |
|        |         | [place], [place]         | [place] [address]             | great toe with       |
|        |         | [address]           | [phone]         | routine healing,     |
|        |         | [phone]         | [phone] (Fax)   | subsequent encounter |
|        |         |                      |                      |  (Primary Dx);       |
|        |         |                      |                      | Post-operative       |
|        |         |                      |                      | state; Open wound of |
|        |         |                      |                      |  toe, subsequent     |
|        |         |                      |                      | encounter            |
+--------+---------+----------------------+----------------------+----------------------+
 
 
 
 Social History
 
 
+---------------+------------+-----------+--------+------------------+
| Tobacco Use   | Types      | Packs/Day | Years  | Date             |
|               |            |           | Used   |                  |
+---------------+------------+-----------+--------+------------------+
| Former Smoker | Cigarettes | 1         | 30     | Quit: 2004 |
+---------------+------------+-----------+--------+------------------+
 
 
 
 
+---------------------+---+---+---+
| Smokeless Tobacco:  |   |   |   |
| Never Used          |   |   |   |
+---------------------+---+---+---+
 
 
 
+------------------------------+
| Comments: quite smoking  |
+------------------------------+
 
 
 
+-------------+-----------+---------+----------+
| Alcohol Use | Drinks/We | oz/Week | Comments |
|             | ek        |         |          |
+-------------+-----------+---------+----------+
| No          |           |         |          |
+-------------+-----------+---------+----------+
 
 
 
+------------------+---------------+
| Sex Assigned at  | Date Recorded |
| Birth            |               |
+------------------+---------------+
| Not on file      |               |
+------------------+---------------+
 as of this encounter
 
 Last Filed Vital Signs
 
 
+-------------------+---------+-------------------------+
| Vital Sign        | Reading | Time Taken              |
+-------------------+---------+-------------------------+
| Blood Pressure    | 109/52  | 2019 11:53 AM PST |
+-------------------+---------+-------------------------+
| Pulse             | 71      | 2019 11:53 AM PST |
+-------------------+---------+-------------------------+
| Temperature       | -       | -                       |
+-------------------+---------+-------------------------+
| Respiratory Rate  | -       | -                       |
+-------------------+---------+-------------------------+
| Oxygen Saturation | 96%     | 2019 11:53 AM PST |
+-------------------+---------+-------------------------+
| Inhaled Oxygen    | -       | -                       |
| Concentration     |         |                         |
+-------------------+---------+-------------------------+
| Weight            | -       | -                       |
+-------------------+---------+-------------------------+
| Height            | -       | -                       |
+-------------------+---------+-------------------------+
| Body Mass Index   | -       | -                       |
+-------------------+---------+-------------------------+
 in this encounter
 
 Progress Notes
 Srinivasan Jordan, REBEKAH - 2019  1:45 PM PSTFormatting of this note may be different fro
m the original.
 
  
 Subjective: 
  Patient ID: Cherie Villalobos is a 69 y.o. female.
 Chief Complaint 
 Patient presents with 
   Follow-up 
   Here today for follow up for right toes, left foot. States that there is a spot on the to
p of the amputation that hasn't fully closed up. Has been having a lot of phantom pain in th
e right foot. Has been taking tramadol. Pain 4/10 currently can get to 8/10. 
  
 
 HPI
 Patient returns today follow-up right great toe wound right foot
 Transmetatarsal amputation left foot remains healed.  She is in the process of obtaining ne
w diabetic shoes and inserts.
 
 The following portions of the patient's history were reviewed and updated as appropriate an
d is available elsewhere in the record: allergies, current medications, past family history,
 past medical history, past social history, past surgical history and problem list.
 
 ROS
 Denies any nausea, vomiting, fever, chills, shortness of breath, chest pain, or calf pain 
 
     
 Objective: 
 /52  | Pulse 71  | SpO2 96% 
 General observation:
 Constitutional: The patient is alert and oriented to person, place and time
 Psychiatric: The patient has normal affect and mood; her speech is normal; her behavior is 
normal.
 Respiratory: Effort normal and breath sounds normal. No accessory muscle usage. No respirat
ory distress.   
 
 Lower Extremity Examination:
 Trans-metatarsal amputation left, site healing well, two very small superficial wounds
 Wound, irregular and linear dorsal right great toe, no erythema, mild edema of toe, improve
d
 Toenail right great is thickened, may come loose over time
 No skin mottling. No hyperesthesias or paresthesias.
 No calf or thigh pain. Negative Homans sign. 
 No pain on palpation of the interphalangeal joint of the hallux right dorsal
 
 Xr Toe Right 2 View
 
 Result Date: 2019
 No radiographic evidence of osteomyelitis seen.  If further assessment is warranted, consid
er correlation with MRI. Potential acute fracture through the base of the distal phalanx. Si
gned by: Gavin South Sign Date/Time: 2019 5:15 PM
 
 Us Lower Extremty  Arterial Right
 
 Result Date: 2019
 1. Right leg shows biphasic inflow with a greater than 50% stenosis at the origin of the dobbins
perficial femoral artery ([id number]).  Biphasic outflow. 2. Two vessel runoff to the right jeanne
t. Signed by: Eugenio Hoffman Sign Date/Time: 2019 3:11 PM
 
 Radiographs: .  See epic chart for complete read
 
 X-ray Foot Left
 
 
 Result Date: 2018
 Amputation of the left forefoot at the level of the proximal metatarsals. Surgical cutaneou
s staples are present. No radiographic evidence for osteomyelitis. Normal alignment of the h
indfoot. Mild degeneration of the midfoot. Electronically signed by Oleksandr Lemus MD on 2018 10:12 AM
 
 Ct Head Without Contrast
 
 Result Date: 2018
 1.  No acute intracranial pathology. Electronically signed by Steven Samano MD on  5:28 PM
 
 X-ray Chest 1 View
 
 Result Date: 2019
 1.  Small loculated pleural fluid collection seen overlying the lateral left heart border i
n the lower left chest.  There may also be a small pleural effusion at the right lung base w
hich is layering posteriorly. 2.  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 Electronically s
igned by Eugenio Hoffman DO on 2019 4:59 PM
 
 Angioplasty 18:
 1. Aorta with narrow aortic bifurcation with moderate stenosis of proximal left common errol
c artery. 
 2. Left SFA with moderate stenosis proximally. 
 3. Single vessel runoff to left foot via left anterior tibial artery. 
 4. Left posterior tibial artery and peroneal artery were occluded with reconstitution of di
stal posterior tibial artery at the left ankle via collaterals. 
 5. If forefoot amputation does not heal, may need popliteal to posterior tibial artery bypa
ss versus antegrade access of left common femoral artery with posterior tibial artery recana
lization. 
  
 Electronically signed by Kirt Mclaughlin MD on 2018 10:51 AM 
 
    
 Assessment and Plan: 
 S/p: TMA left on 18
 DM, ESRD, PAD
 Open wound right great toe
 
 Rx mupirocin
 Continue use of surgical shoe right, removable cast boot left until diabetic shoes and inse
rts obtained
 Then transition to the use
 Follow-up in 1 month
 
 Srinivasan Jordan DPM
  in this encounter
 
 Plan of Treatment
 
 
+--------+---------+-----------+----------------------+-------------+
| Date   | Type    | Specialty | Care Team            | Description |
+--------+---------+-----------+----------------------+-------------+
| 04/10/ | Office  | Podiatry  |   Srinivasan Jordan,  |             |
|    | Visit   |           | DPM  [address] [facility], |             |
|        |         |           |  [address]  [place],  |             |
|        |         |           | [place] [address]             |             |
|        |         |           | [phone]         |             |
|        |         |           | [phone] (Fax)   |             |
 
+--------+---------+-----------+----------------------+-------------+
 as of this encounter
 
 Visit Diagnoses
 
 
+-----------------------------------------------------------------------------------+
| Diagnosis                                                                         |
+-----------------------------------------------------------------------------------+
|   Closed nondisplaced fracture of distal phalanx of right great toe with routine  |
| healing, subsequent encounter - Primary                                           |
+-----------------------------------------------------------------------------------+
|   Post-operative state                                                            |
+-----------------------------------------------------------------------------------+
|   Other postprocedural status                                                     |
+-----------------------------------------------------------------------------------+
|   Open wound of toe, subsequent encounter                                         |
+-----------------------------------------------------------------------------------+

## 2019-04-03 NOTE — XMS
Encounter Summary
  Created on: 2019
 
 Cherie Villalobos
 External Reference #: [id number]
 : 48
 Sex: Female
 
 Demographics
 
 
+-----------------------+--------------------------+
| Address               | [address]               |
|                       | [place]  [address] |
+-----------------------+--------------------------+
| Home Phone            | [phone]          |
+-----------------------+--------------------------+
| Preferred Language    | Unknown                  |
+-----------------------+--------------------------+
| Marital Status        |                   |
+-----------------------+--------------------------+
| Orthodoxy Affiliation | 1013                     |
+-----------------------+--------------------------+
| Race                  | Unknown                  |
+-----------------------+--------------------------+
| Ethnic Group          | Unknown                  |
+-----------------------+--------------------------+
 
 
 Author
 
 
+--------------+-----------------------+
| Author       | [employer] |
+--------------+-----------------------+
| Organization | [facility] |
+--------------+-----------------------+
| Address      | Unknown               |
+--------------+-----------------------+
| Phone        | Unavailable           |
+--------------+-----------------------+
 
 
 
 Support
 
 
+---------------+--------------+---------------------+-----------------+
| Name          | Relationship | Address             | Phone           |
+---------------+--------------+---------------------+-----------------+
| Anoop Villalobos | ECON         | [place], | [phone] |
|               |              |  [place]  [address]     |                 |
+---------------+--------------+---------------------+-----------------+
| Jennifer Dickson | ECON         | [place]       | [phone] |
|               |              | [id number]               |                 |
+---------------+--------------+---------------------+-----------------+
 
 
 
 
 Care Team Providers
 
 
+-----------------------+------+-----------------+
| Care Team Member Name | Role | Phone           |
+-----------------------+------+-----------------+
| Ivy Couch DO      | PCP  | [phone] |
+-----------------------+------+-----------------+
 
 
 
 Reason for Visit
 
 
+--------------+-------------------------------------------------------------------+
| Reason       | Comments                                                          |
+--------------+-------------------------------------------------------------------+
| Post-op Exam | Post op, amputation, left foot. Patient pain level is 3/10 and    |
|              | relates she Fx her right hallux. Patient is unsure when her       |
|              | injury took place and went to [facility] ED and had xrays. Patient      |
|              | states she has been walking on the toe since going to the ED.     |
|              | Patient states her left foot continues to phantom pain. Patient   |
|              | states that other than phantom pain her left foot is doing well.  |
+--------------+-------------------------------------------------------------------+
 
 
 
 Encounter Details
 
 
+--------+---------+----------------------+----------------------+----------------------+
| Date   | Type    | Department           | Care Team            | Description          |
+--------+---------+----------------------+----------------------+----------------------+
| / | Office  |   [facility] |   Srinivasan Jordan,  | Closed nondisplaced  |
| 2019   | Visit   |  and Ankle  [address]      | DPM  [address], | fracture of distal   |
|        |         | [address]   |  [address]  [place],  | phalanx of right     |
|        |         | [facility] [place]         | [place] [address]             | great toe with       |
|        |         | [address]           | [phone]         | routine healing,     |
|        |         | [phone]         | [phone] (Fax)   | subsequent encounter |
|        |         |                      |                      |  (Primary Dx);       |
|        |         |                      |                      | Post-operative       |
|        |         |                      |                      | state; Toe pain,     |
|        |         |                      |                      | right                |
+--------+---------+----------------------+----------------------+----------------------+
 
 
 
 Social History
 
 
+---------------+------------+-----------+--------+------------------+
| Tobacco Use   | Types      | Packs/Day | Years  | Date             |
|               |            |           | Used   |                  |
+---------------+------------+-----------+--------+------------------+
| Former Smoker | Cigarettes | 1         | 30     | Quit: 2004 |
+---------------+------------+-----------+--------+------------------+
 
 
 
 
+---------------------+---+---+---+
| Smokeless Tobacco:  |   |   |   |
| Never Used          |   |   |   |
+---------------------+---+---+---+
 
 
 
+------------------------------+
| Comments: quite smoking  |
+------------------------------+
 
 
 
+-------------+-----------+---------+----------+
| Alcohol Use | Drinks/We | oz/Week | Comments |
|             | ek        |         |          |
+-------------+-----------+---------+----------+
| No          |           |         |          |
+-------------+-----------+---------+----------+
 
 
 
+------------------+---------------+
| Sex Assigned at  | Date Recorded |
| Birth            |               |
+------------------+---------------+
| Not on file      |               |
+------------------+---------------+
 as of this encounter
 
 Last Filed Vital Signs
 
 
+-------------------+---------------------+-------------------------+
| Vital Sign        | Reading             | Time Taken              |
+-------------------+---------------------+-------------------------+
| Blood Pressure    | 124/60              | 2019  2:40 PM PST |
+-------------------+---------------------+-------------------------+
| Pulse             | 92                  | 2019  2:40 PM PST |
+-------------------+---------------------+-------------------------+
| Temperature       | 36.8   C (98.2   F) | 2019  2:40 PM PST |
+-------------------+---------------------+-------------------------+
| Respiratory Rate  | -                   | -                       |
+-------------------+---------------------+-------------------------+
| Oxygen Saturation | 97%                 | 2019  2:40 PM PST |
+-------------------+---------------------+-------------------------+
| Inhaled Oxygen    | -                   | -                       |
| Concentration     |                     |                         |
+-------------------+---------------------+-------------------------+
| Weight            | -                   | -                       |
+-------------------+---------------------+-------------------------+
| Height            | -                   | -                       |
+-------------------+---------------------+-------------------------+
| Body Mass Index   | -                   | -                       |
+-------------------+---------------------+-------------------------+
 in this encounter
 
 Progress Notes
 Srinivasan Jordan DPM - 2019  2:45 PM PSTFormatting of this note may be different fro
m the original.
 
  
 Subjective: 
  Patient ID: Cherie Villalobos is a 68 y.o. female.
 Chief Complaint 
 Patient presents with 
   Post-op Exam 
   Post op, amputation, left foot. Patient pain level is 3/10 and relates she Fx her right h
allux. Patient is unsure when her injury took place and went to [facility] ED and had xrays. Cindi
nt states she has been walking on the toe since going to the ED.  Patient states her left fo
ot continues to phantom pain. Patient states that other than phantom pain her left foot is d
oing well.  
  
 
 HPI
 Patient returns today status post TMA left on 18.  She reports that she suffered a fa
ll and may have broken her right great toe.  Initially in the hospital she reports that she 
would told she would need amputation of the toe.  It is looking better over time but slowly.
 Her left foot is healing well after Trans-metatarsal amputation, no concerns with that. 
 
 The following portions of the patient's history were reviewed and updated as appropriate an
d is available elsewhere in the record: allergies, current medications, past family history,
 past medical history, past social history, past surgical history and problem list.
 
 ROS
 Denies any nausea, vomiting, fever, chills, shortness of breath, chest pain, or calf pain 
 
     
 Objective: 
 /60  | Pulse 92  | Temp 98.2 F (36.8 C)  | SpO2 97% 
 General observation:
 Constitutional: The patient is alert and oriented to person, place and time
 Psychiatric: The patient has normal affect and mood; her speech is normal; her behavior is 
normal.
 Respiratory: Effort normal and breath sounds normal. No accessory muscle usage. No respirat
ory distress.   
 
 Lower Extremity Examination:
 Trans-metatarsal amputation left, site healing well, two very small superficial wounds
 Wound, irregular and linear dorsal right great toe, no erythema, mild edema of toe
 No skin mottling. No hyperesthesias or paresthesias.
 No calf or thigh pain. Negative Homans sign. 
 pain on palpation of the interphalangeal joint of the hallux right dorsal
 
 Xr Toe Right 2 View
 
 Result Date: 2019
 No radiographic evidence of osteomyelitis seen.  If further assessment is warranted, consid
er correlation with MRI. Potential acute fracture through the base of the distal phalanx. Si
gned by: Gavin South Sign Date/Time: 2019 5:15 PM
 
 Us Lower Extremty  Arterial Right
 
 Result Date: 2019
 1. Right leg shows biphasic inflow with a greater than 50% stenosis at the origin of the dobbins
perficial femoral artery ([id number]).  Biphasic outflow. 2. Two vessel runoff to the right jeanne
t. Signed by: Eugenio Hoffman Sign Date/Time: 2019 3:11 PM
 
 Radiographs: .  See epic chart for complete read
 
 X-ray Foot Left
 
 
 Result Date: 2018
 Amputation of the left forefoot at the level of the proximal metatarsals. Surgical cutaneou
s staples are present. No radiographic evidence for osteomyelitis. Normal alignment of the h
indfoot. Mild degeneration of the midfoot. Electronically signed by Oleksandr Lemus MD on 2018 10:12 AM
 
 Ct Head Without Contrast
 
 Result Date: 2018
 1.  No acute intracranial pathology. Electronically signed by Steven Samano MD on  5:28 PM
 
 X-ray Chest 1 View
 
 Result Date: 2019
 1.  Small loculated pleural fluid collection seen overlying the lateral left heart border i
n the lower left chest.  There may also be a small pleural effusion at the right lung base w
hich is layering posteriorly. 2.  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 Electronically s
igned by Eugenio Hoffman DO on 2019 4:59 PM
 
 Angioplasty 18:
 1. Aorta with narrow aortic bifurcation with moderate stenosis of proximal left common errol
c artery. 
 2. Left SFA with moderate stenosis proximally. 
 3. Single vessel runoff to left foot via left anterior tibial artery. 
 4. Left posterior tibial artery and peroneal artery were occluded with reconstitution of di
stal posterior tibial artery at the left ankle via collaterals. 
 5. If forefoot amputation does not heal, may need popliteal to posterior tibial artery bypa
ss versus antegrade access of left common femoral artery with posterior tibial artery recana
lization. 
  
 Electronically signed by Kirt Mclaughlin MD on 2018 10:51 AM 
 
    
 Assessment and Plan: 
 S/p: TMA left on 18
 DM, ESRD, PAD
 Healing open fracture of right great toe?
 
 Radiographs taken, read and reviewed of involved foot/ankle and findings were discussed wit
h the patient
 Irregularity at the base of the distal phalanx concerning for avulsion fracture
 Wound appears stable dorsal right great toe
 Dressing change/applied today with dry sterile
 Rx diabetic shoes with toe filler left
 Continue use of removable cast boot until shoes received
 Fit with surgical shoe right, wear for all activities until DM shoes obtained
 Dress wound right great toe with bacitracin ointment daily with dry sterile dressing
 F/u 2 weeks
 
 Srinivasan Jordan DPM
  in this encounter
 
 Plan of Treatment
 
 
+--------+---------+-----------+----------------------+-------------+
| Date   | Type    | Specialty | Care Team            | Description |
+--------+---------+-----------+----------------------+-------------+
 
| 04/10/ | Office  | Podiatry  |   Srinivasan Jordan FAY,  |             |
|    | Visit   |           | DPM  [address], |             |
|        |         |           |  [address]  [place],  |             |
|        |         |           | [place] [address]             |             |
|        |         |           | [phone]         |             |
|        |         |           | [phone] (Fax)   |             |
+--------+---------+-----------+----------------------+-------------+
 as of this encounter
 
 Results
 X-ray foot right 3+ views (2019  2:58 PM)
 
+------------------------------------------------------------------------+--------------+
| Impressions                                                            | Performed At |
+------------------------------------------------------------------------+--------------+
|   Linear lucency/irregularity at the distal 1st phalanx, may represent |   [facility]     |
|   minimally displaced fracture.  Signed by: Oleksandr Lemus  Sign         | RADIOLOGY    |
| Date/Time: 2019 10:20 AM                                         |              |
+------------------------------------------------------------------------+--------------+
 
 
 
+------------------------------------------------------------------------+--------------+
| Narrative                                                              | Performed At |
+------------------------------------------------------------------------+--------------+
|   RIGHT FOOT THREE VIEWS  CLINICAL INFORMATION:  Possible fracture of  |   [facility]     |
| right hallux.  COMPARISON:  XR TOES RIGHT (2019); XR FOOT LEFT    | RADIOLOGY    |
| (2018);  FINDINGS:  No acute fracture or dislocation.  Small     |              |
| calcaneal plantar enthesophyte.  Linear lucency/irregularity at the    |              |
| distal 1st phalanx, may represent  minimally displaced fracture.  Mild |              |
|  midfoot degeneration.                                                 |              |
+------------------------------------------------------------------------+--------------+
 
 
 
+-------------------------------------------------------------------------+
| Procedure Note                                                          |
+-------------------------------------------------------------------------+
|   Lauro Baldwin Results In - 2019 10:23 AM PST  RIGHT FOOT THREE VIEWS |
| CLINICAL INFORMATION:                                                   |
| Possible fracture of right hallux.                                      |
| COMPARISON:                                                             |
| XR TOES RIGHT (2019); XR FOOT LEFT (2018);                   |
| FINDINGS:                                                               |
| No acute fracture or dislocation.                                       |
| Small calcaneal plantar enthesophyte.                                   |
| Linear lucency/irregularity at the distal 1st phalanx, may represent    |
| minimally displaced fracture.                                           |
| Mild midfoot degeneration.                                              |
| IMPRESSION:                                                             |
| Linear lucency/irregularity at the distal 1st phalanx, may represent    |
| minimally displaced fracture.                                           |
| Signed by: Oleksandr Lemus                                                 |
| Sign Date/Time: 2019 10:20 AM                                     |
+-------------------------------------------------------------------------+
 
 
 
+--------------------+------------------+--------------------+--------------+
| Performing         | Address          | City/State/Zipcode | Phone Number |
 
| Organization       |                  |                    |              |
+--------------------+------------------+--------------------+--------------+
|   [facility] |   [address] | [place] [address] |              |
+--------------------+------------------+--------------------+--------------+
 X-ray foot left 3 + views (2019  2:58 PM)
 
+----------------------------------------------------------------------+--------------+
| Impressions                                                          | Performed At |
+----------------------------------------------------------------------+--------------+
|   No acute fracture.    No radiographic evidence for osteomyelitis   |   [facility]     |
| Signed by: Oleksandr Lemus Date/Time: 2019 10:21 AM         | RADIOLOGY    |
+----------------------------------------------------------------------+--------------+
 
 
 
+------------------------------------------------------------------------+--------------+
| Narrative                                                              | Performed At |
+------------------------------------------------------------------------+--------------+
|   LEFT FOOT THREE VIEWS  CLINICAL INFORMATION:  8 weeks post op,       |   [facility]     |
| forefoot amputation.  COMPARISON:  XR TOES RIGHT (2019); XR FOOT  | RADIOLOGY    |
| LEFT (2018); XR FOOT LEFT  (2018);  FINDINGS:  Amputation  |              |
| of the forefoot at the level of the proximal metacarpals.  No          |              |
| radiographic evidence for osteomyelitis.  No acute fractures.          |              |
+------------------------------------------------------------------------+--------------+
 
 
 
+------------------------------------------------------------------------+
| Procedure Note                                                         |
+------------------------------------------------------------------------+
|   Lauro Baldwin Results In 2019 10:25 AM PST  LEFT FOOT THREE VIEWS |
| CLINICAL INFORMATION:                                                  |
| 8 weeks post op, forefoot amputation.                                  |
| COMPARISON:                                                            |
| XR TOES RIGHT (2019); XR FOOT LEFT (2018); XR FOOT LEFT     |
| (2018);                                                          |
| FINDINGS:                                                              |
| Amputation of the forefoot at the level of the proximal metacarpals.   |
| No radiographic evidence for osteomyelitis.                            |
| No acute fractures.                                                    |
| IMPRESSION:                                                            |
| No acute fracture.  No radiographic evidence for osteomyelitis         |
| Signed by: Oleksandr Lemus                                                |
| Sign Date/Time: 2019 10:21 AM                                    |
+------------------------------------------------------------------------+
 
 
 
+--------------------+------------------+--------------------+--------------+
| Performing         | Address          | City/State/Zipcode | Phone Number |
| Organization       |                  |                    |              |
+--------------------+------------------+--------------------+--------------+
|   [facility] |   [address] | [place] [address] |              |
+--------------------+------------------+--------------------+--------------+
 in this encounter
 
 Visit Diagnoses
 
 
+-----------------------------------------------------------------------------------+
 
| Diagnosis                                                                         |
+-----------------------------------------------------------------------------------+
|   Closed nondisplaced fracture of distal phalanx of right great toe with routine  |
| healing, subsequent encounter - Primary                                           |
+-----------------------------------------------------------------------------------+
|   Post-operative state                                                            |
+-----------------------------------------------------------------------------------+
|   Other postprocedural status                                                     |
+-----------------------------------------------------------------------------------+
|   Toe pain, right                                                                 |
+-----------------------------------------------------------------------------------+
|   Pain in limb                                                                    |
+-----------------------------------------------------------------------------------+

## 2019-04-03 NOTE — XMS
Clinical Summary
  Created on: 2019
 
 Cherie Jo
 External Reference #: [id number]
 : 48
 Sex: Female
 
 Demographics
 
 
+-----------------------+--------------------------+
| Address               | [address]               |
|                       | [place]  [address] |
+-----------------------+--------------------------+
| Home Phone            | [phone]          |
+-----------------------+--------------------------+
| Preferred Language    | Unknown                  |
+-----------------------+--------------------------+
| Marital Status        |                   |
+-----------------------+--------------------------+
| Bahai Affiliation | [id number]                     |
+-----------------------+--------------------------+
| Race                  | Unknown                  |
+-----------------------+--------------------------+
| Ethnic Group          | Unknown                  |
+-----------------------+--------------------------+
 
 
 Author
 
 
+--------------+-----------------------+
| Author       | [employer] |
+--------------+-----------------------+
| Organization | [facility] |
+--------------+-----------------------+
| Address      | Unknown               |
+--------------+-----------------------+
| Phone        | Unavailable           |
+--------------+-----------------------+
 
 
 
 Support
 
 
+---------------+--------------+---------------------+-----------------+
| Name          | Relationship | Address             | Phone           |
+---------------+--------------+---------------------+-----------------+
| Anoop Jo | ECON         | [place], | [phone] |
|               |              |  [place]  [address]     |                 |
+---------------+--------------+---------------------+-----------------+
| Jennifer Dickson | ECON         | [place]       | [phone] |
|               |              | [phone]               |                 |
+---------------+--------------+---------------------+-----------------+
 
 
 
 
 Care Team Providers
 
 
+-----------------------+------+-----------------+
| Care Team Member Name | Role | Phone           |
+-----------------------+------+-----------------+
| Ivy Couch DO      | PP   | [phone] |
+-----------------------+------+-----------------+
 
 
 
 Allergies
 
 
+---------------------+----------------------+----------+----------+----------------------+
| Active Allergy      | Reactions            | Severity | Noted    | Comments             |
|                     |                      |          | Date     |                      |
+---------------------+----------------------+----------+----------+----------------------+
| Digitoxin           | Other (See Comments) | High     | 20 |   Toxic, patient     |
|                     |                      |          | 18       | states her eyes were |
|                     |                      |          |          |  also affected "she  |
|                     |                      |          |          | seen yellow          |
|                     |                      |          |          | everywhere"          |
+---------------------+----------------------+----------+----------+----------------------+
| Digoxin And Related | Other (See Comments) | Medium   | 20 |   toxic              |
|                     |                      |          | 18       |                      |
+---------------------+----------------------+----------+----------+----------------------+
| Metaxalone          | Other (See Comments) | Medium   |          |                      |
+---------------------+----------------------+----------+----------+----------------------+
| Morphine            | Mental Changes,      | High     | 20 |   "makes me feel     |
|                     | Other (See           |          | 12       | jittery"  Other      |
|                     | Comments), Anxiety   |          |          | reaction(s): MAKES   |
|                     |                      |          |          | HER "CRAZY"          |
|                     |                      |          |          | Hallucinations  Make |
|                     |                      |          |          |  her crazy/ "funny   |
|                     |                      |          |          | feeling in my head"  |
|                     |                      |          |          |  Has used            |
|                     |                      |          |          | hydromorphone        |
|                     |                      |          |          | in-house             |
+---------------------+----------------------+----------+----------+----------------------+
| Pantoprazole Sodium | Nausea and Vomiting  | Medium   | 20 |                      |
|                     |                      |          | 18       |                      |
+---------------------+----------------------+----------+----------+----------------------+
| Penicillin G        | Hives                | High     | 20 |                      |
|                     |                      |          | 10       |                      |
+---------------------+----------------------+----------+----------+----------------------+
| Penicillins         | Rash, Hives          | High     | 20 |   Other reaction(s): |
|                     |                      |          | 11       |  RASH  Tolerates     |
|                     |                      |          |          | cephalosporins       |
+---------------------+----------------------+----------+----------+----------------------+
| Quinapril Hcl       | Edema                | Medium   | 20 |   Facial Edema       |
|                     |                      |          | 13       |                      |
+---------------------+----------------------+----------+----------+----------------------+
| Quinapril Hcl       | Anaphylaxis          | High     | 20 |                      |
|                     |                      |          | 18       |                      |
+---------------------+----------------------+----------+----------+----------------------+
| Quinapril Hcl       | Angioedema           | High     | 20 |   Facial Edema       |
|                     |                      |          | 13       |                      |
+---------------------+----------------------+----------+----------+----------------------+
 
| Pseudoephedrine Hcl | Rash                 | Medium   | 20 |                      |
|                     |                      |          | 11       |                      |
+---------------------+----------------------+----------+----------+----------------------+
 
 
 
 Current Medications
 
 
+----------------------+----------------------+--------+---------+------+------+-------+
| Prescription         | Sig.                 | Disp.  | Refills | Star | End  | Statu |
|                      |                      |        |         | t    | Date | s     |
|                      |                      |        |         | Date |      |       |
+----------------------+----------------------+--------+---------+------+------+-------+
|   rOPINIRole         | Take 0.75 mg by      |        |         |      |      | Activ |
| (REQUIP) 0.25 MG     | mouth nightly.       |        |         |      |      | e     |
| tablet               |                      |        |         |      |      |       |
+----------------------+----------------------+--------+---------+------+------+-------+
|   nortriptyline      | Take 25-75 mg by     |        |         |      |      | Activ |
| (PAMELOR) 25 MG      | mouth See Admin      |        |         |      |      | e     |
| capsule              | Instructions. Takes  |        |         |      |      |       |
|                      | 25 mg by mouth every |        |         |      |      |       |
|                      |  morning and 75 mg   |        |         |      |      |       |
|                      | every night          |        |         |      |      |       |
+----------------------+----------------------+--------+---------+------+------+-------+
|   oxybutynin         | Take 7.5 mg by mouth |        |         |      |      | Activ |
| (DITROPAN) 5 MG      |  2 (two) times       |        |         |      |      | e     |
| tablet               | daily.               |        |         |      |      |       |
+----------------------+----------------------+--------+---------+------+------+-------+
|   albuterol          | Inhale 2 puffs into  |        |         |      |      | Activ |
| (PROVENTIL           | the lungs every 4    |        |         |      |      | e     |
| HFA;VENTOLIN HFA)    | (four) hours as      |        |         |      |      |       |
| 108 (90 Base)        | needed for Wheezing. |        |         |      |      |       |
| MCG/ACT              |                      |        |         |      |      |       |
| inhalerIndications:  |                      |        |         |      |      |       |
| states uses 2x       |                      |        |         |      |      |       |
| monthly average      |                      |        |         |      |      |       |
+----------------------+----------------------+--------+---------+------+------+-------+
|   insulin aspart     | Inject  into the     |        |         |      |      | Activ |
| (NOVOLOG) 100        | skin 3 (three) times |        |         |      |      | e     |
| UNIT/ML injection    |  daily before meals. |        |         |      |      |       |
|                      |  Sliding scale       |        |         |      |      |       |
+----------------------+----------------------+--------+---------+------+------+-------+
|   apixaban (ELIQUIS) | Take 1 tablet by     |   60   | 0       | 12/0 |      | Activ |
|  2.5 MG tablet       | mouth 2 (two) times  | tablet |         | 3/20 |      | e     |
|                      | daily.               |        |         | 18   |      |       |
+----------------------+----------------------+--------+---------+------+------+-------+
|   isosorbide         | Take 1 tablet by     |   30   | 1       | 12/0 | 12/0 | Activ |
| mononitrate (IMDUR)  | mouth daily.         | tablet |         | 5/20 | 5/20 | e     |
| 60 MG 24 hr tablet   |                      |        |         | 18   | 19   |       |
+----------------------+----------------------+--------+---------+------+------+-------+
|   ondansetron        | Take 4 mg by mouth   |        |         | 09/ |      | Activ |
| (ZOFRAN-ODT) 4 MG    | every 8 (eight)      |        |         | / |      | e     |
| disintegrating       | hours as needed.     |        |         | 18   |      |       |
| tablet               |                      |        |         |      |      |       |
+----------------------+----------------------+--------+---------+------+------+-------+
|   insulin degludec   | Inject 21 units      |        |         |      |      | Activ |
| (TRESIBA) 100        | every night          |        |         |      |      | e     |
| UNIT/ML injection    |                      |        |         |      |      |       |
+----------------------+----------------------+--------+---------+------+------+-------+
 
|   esomeprazole       | Take 1 capsule by    |        |         |      |      | Activ |
| (NEXIUM) 40 MG       | mouth every day      |        |         |      |      | e     |
| capsule              |                      |        |         |      |      |       |
+----------------------+----------------------+--------+---------+------+------+-------+
|                      | Take 1 tablet by     |   30   | 0       | 01/0 |      | Activ |
| HYDROcodone-acetamin | mouth every 4 (four) | tablet |         | 4/20 |      | e     |
| ophen (NORCO) 5-325  |  hours as needed.    |        |         | 19   |      |       |
| MG per tablet        |                      |        |         |      |      |       |
+----------------------+----------------------+--------+---------+------+------+-------+
|   carvedilol (COREG) | Take 1 tablet by     |   60   | 0       | 01/1 | 01/1 | Activ |
|  12.5 MG tablet      | mouth 2 (two) times  | tablet |         | /20 | 7/20 | e     |
|                      | daily with meals.    |        |         | 19   | 20   |       |
+----------------------+----------------------+--------+---------+------+------+-------+
|   nitroGLYCERIN      | Place 1 tablet under |   30   | 0       |  |  | Activ |
| (NITROSTAT) 0.4 MG   |  the tongue every 5  | tablet |         | /20 | 7 | e     |
| SL tablet            | (five) minutes as    |        |         | 19   | 20   |       |
|                      | needed for Chest     |        |         |      |      |       |
|                      | pain.                |        |         |      |      |       |
+----------------------+----------------------+--------+---------+------+------+-------+
|   prochlorperazine 5 | TAKE ONE TABLET BY   |   60   | 11      |  |      | Activ |
|  MG tablet           | MOUTH TWICE DAILY AS | tablet |         |  |      | e     |
|                      |  NEEDED FOR NAUSEA   |        |         | 19   |      |       |
+----------------------+----------------------+--------+---------+------+------+-------+
|   losartan (COZAAR)  | Take 100 mg by mouth |        |         | /2 |      | Activ |
| 100 MG tablet        |  daily.              |        |         | 020 |      | e     |
|                      |                      |        |         | 19   |      |       |
+----------------------+----------------------+--------+---------+------+------+-------+
|   TRINTELLIX 20 MG   | Take 20 mg by mouth  |        |         | /2 |      | Activ |
| TABS                 | every evening.       |        |         | 020 |      | e     |
|                      |                      |        |         | 19   |      |       |
+----------------------+----------------------+--------+---------+------+------+-------+
|   atorvastatin       | Take 80 mg by mouth  |        |         | 02/0 |      | Activ |
| (LIPITOR) 80 MG      | nightly.             |        |         | 3/20 |      | e     |
| tablet               |                      |        |         | 19   |      |       |
+----------------------+----------------------+--------+---------+------+------+-------+
|   LORazepam (ATIVAN) | Take 0.5 tablets by  |        |         | 02/1 |      | Activ |
|  1 MG tablet         | mouth every 8        |        |         | 5/20 |      | e     |
|                      | (eight) hours as     |        |         | 19   |      |       |
|                      | needed for Anxiety.  |        |         |      |      |       |
|                      | Patient states she   |        |         |      |      |       |
|                      | has the 1mg tablets  |        |         |      |      |       |
|                      | at home and that     |        |         |      |      |       |
|                      | they are scored and  |        |         |      |      |       |
|                      | she will split them  |        |         |      |      |       |
|                      | in half              |        |         |      |      |       |
+----------------------+----------------------+--------+---------+------+------+-------+
|   glucose blood test | Contour Next Test    |        |         |      |      | Activ |
|  strip               | Strips USE 1 STRIP   |        |         |      |      | e     |
|                      | TO CHECK GLUCOSE     |        |         |      |      |       |
|                      | THREE TIMES DAILY    |        |         |      |      |       |
|                      | FOR 30 DAYS          |        |         |      |      |       |
+----------------------+----------------------+--------+---------+------+------+-------+
|   LORazepam (ATIVAN) |                      |        |         | 02/2 |      | Activ |
|  0.5 MG tablet       |                      |        |         | 1/20 |      | e     |
|                      |                      |        |         | 19   |      |       |
+----------------------+----------------------+--------+---------+------+------+-------+
|   traMADol (ULTRAM)  | Take 1 tablet by     |   30   | 0       | 02/2 |      | Activ |
| 50 MG tablet         | mouth every 6 (six)  | tablet |         | 8/20 |      | e     |
|                      | hours as needed for  |        |         | 19   |      |       |
|                      | Pain.                |        |         |      |      |       |
 
+----------------------+----------------------+--------+---------+------+------+-------+
|   mupirocin          | Apply  topically     |   22 g | 0       | 03/0 | 03/1 | Expir |
| (BACTROBAN) 2 %      | daily for 7 days.    |        |         | 6/20 | 3/20 | ed    |
| ointment             |                      |        |         | 19   | 19   |       |
+----------------------+----------------------+--------+---------+------+------+-------+
 
 
 
 Active Problems
 
 
+-----------------------------------------------------+------------+
| Problem                                             | Noted Date |
+-----------------------------------------------------+------------+
| Macrocytosis                                        | 2019 |
+-----------------------------------------------------+------------+
| Lactic acidosis                                     | 2019 |
+-----------------------------------------------------+------------+
| Falls frequently                                    | 2019 |
+-----------------------------------------------------+------------+
| Restless legs syndrome                              | 2019 |
+-----------------------------------------------------+------------+
| Overactive bladder                                  | 2019 |
+-----------------------------------------------------+------------+
| CAD (coronary artery disease)                       | 2018 |
+-----------------------------------------------------+------------+
| Chronic multifocal osteomyelitis of left foot (HCC) | 2018 |
+-----------------------------------------------------+------------+
| PVD (peripheral vascular disease) (formerly [facility])             | 2018 |
+-----------------------------------------------------+------------+
| COPD (chronic obstructive pulmonary disease) (formerly [facility])  | 2018 |
+-----------------------------------------------------+------------+
| Mixed hyperlipidemia                                | 2018 |
+-----------------------------------------------------+------------+
 
 
 
+---------------------------------------------------------------+
|   Overview:   Last Assessment & Plan:                         |
| Moderate dose statin and don't titrate due to ESRD and ASHD.  |
| - Continue Atorvastatin 20mg                                  |
+---------------------------------------------------------------+
 
 
 
+-------------------------------------------------------+------------+
| ICD (implantable cardioverter-defibrillator) in place | 2018 |
+-------------------------------------------------------+------------+
 
 
 
+-------------------------------------------------------------------+
|   Overview:   Overview: Formatting of this note may be different  |
| from the original. MODEL NAME MODEL# SERIAL# DATE IMPLANTED       |
| GENERATOR [email or web address] Dyn[employer] EL ICD DF4 [id number] 18 |
|  RV LEAD [employer] [place] [address] [place] [id number] 18  |
| Indication: Nonischemic dilated cardiopathy with persistent       |
| severely depressed left ventricular systolic function, LVEF of    |
| LVEF of 30%                                                       |
+-------------------------------------------------------------------+
 
 
 
 
+-----------------------------------------------+------------+
| Implantable defibrillator reprogramming/check | 2018 |
+-----------------------------------------------+------------+
| Pleural effusion                              | 2018 |
+-----------------------------------------------+------------+
 
 
 
+-------------------------------------------------------------------+
|   Overview:   Last Assessment & Plan: Post MV repair and CABG x2  |
| 2018 and recurrent pleural effusions and elevated ESR.       |
| Plan:Received 1 time dose of Colchicine Started on Prednisone per |
|  surgery CXR today shows stable bilateral pleural effusions       |
|Received 1 time dose of Colchicine                                 |
|Started on Prednisone per surgery                                  |
|CXR today shows stable bilateral pleural effusions                 |
+-------------------------------------------------------------------+
 
 
 
+-------------------------------------------------+------------+
| Prolonged Q-T interval on ECG                   | 2018 |
+-------------------------------------------------+------------+
| Chronic systolic congestive heart failure (HCC) | 2018 |
+-------------------------------------------------+------------+
 
 
 
+-------------------------------------------------------------------+
|   Overview:   Overview: Echocardiogram 2018 shows mild       |
| biatrial dilatation, mild left ventricular dilatation with a mild |
|  eccentric left ventricular hypertrophy, there is a moderate      |
| global hypokinesis of left ventricle, lvef is 30-35%, normal      |
| right ventricular size and wall thickness with a mildly reduced   |
| right ventricular systolic function, mildly thickened and         |
| calcified trileaflet aortic valve with adequate opening, there is |
|  a mild aortic valve insufficiency, mildly thickened and          |
| calcified mitral valve suggesting myxomatous change with evidence |
|  of mitral valve repair, there is at least moderate central       |
| mitral valve regurgitation, mild tricuspid valve regurgitation,   |
| mild to moderate pulmonary hypertension with a peak systolic      |
| pressure of 50-55 mmhg, normal ivc with normal respiratory        |
| collapse, when compared to echocardiography on 3/7/18, left       |
| ventricular systolicsystolic function is slightly                 |
| improved.Echocardiogram 2018 show overall left ventricular    |
| systolic function is severely impaired with, an EF between 20 -   |
| 25 %, there is mild aortic regurgitation, there is mild aortic    |
| stenosis present, mild-to-moderate tricuspid regurgitation        |
| present, there is mild pulmonary hypertension, pleural effusion   |
| present. Duglas Ornelas MD, [facility]; [facility] Last Assessment & Plan: EF |
|  25% s/p CABG on 18 but now down to 15%. Plan:Continue Coreg |
|  and Losartan Hemodialysis for fluid removal.Strict I/O and daily |
|  weights.                                                         |
+-------------------------------------------------------------------+
 
 
 
 
+------------------+------------+
| Chronic diarrhea | 2018 |
+------------------+------------+
 
 
 
+---------------------------------------------+
|   Overview:   Colitis as working diagnosis. |
+---------------------------------------------+
 
 
 
+---------------------------------------------+------------+
| Chronic anticoagulation                     | 2018 |
+---------------------------------------------+------------+
| Plantar ulcer (formerly [facility])                         | 2018 |
+---------------------------------------------+------------+
| Steroid-induced hyperglycemia               | 2018 |
+---------------------------------------------+------------+
| Moderate protein-calorie malnutrition (HCC) | 2018 |
+---------------------------------------------+------------+
| Stress hyperglycemia                        | 2018 |
+---------------------------------------------+------------+
| Cardiomyopathy (HCC)                        | 2018 |
+---------------------------------------------+------------+
 
 
 
+-------------------------------------------------------------------+
|   Overview:   Overview: Ischemic (3 V CAD) and non ischemic       |
| (Hypertension and MR and DM and ESRD). Tolerated metoprolol,      |
| losartan but avoid spironolactone due to ESRD. Last Assessment &  |
| Plan: Severe ischemic cardiomyopathy (EF 15%) and ESRD on HD who  |
| presented with dyspnea related to bilateral pleural effusions and |
|  episodic AF with RVR now s/p thoracentesis with significant      |
| improvement in her symptoms and in NSR. Plan:Volume removal with  |
| dialysis as toleratedLasix 80 mg twice a day Coreg and low dose   |
| losartan. Up titrate as tolerated but working on fluid removal    |
| and maintaining adequate BP. Strict I/O with daily weights.       |
|Strict I/O with daily weights.                                     |
+-------------------------------------------------------------------+
 
 
 
+--------------------------------------+------------+
| Paroxysmal atrial fibrillation (HCC) | 2018 |
+--------------------------------------+------------+
 
 
 
+-------------------------------------------------------------------------------------------
--------------------------------------------------------+
|   Overview:   Overview: PAF with dialysis in the past and                                 
                                                        |
| recurrent post op MV/CABG surgery. Last Assessment & Plan:                                
                                                        |
| Remains in NSRNormal atrial size by echocardiogram. Plan:Continue                         
                                                        |
|  Coreg with up-titration as toleratedAmiodarone 400 mg BID while                          
                                                        |
 
| inpatient and then transition to 200 mg BID for 2 weeks and then                          
                                                        |
| 200 mg daily. Continue po 1-3 months post discharge. Continue                             
                                                        |
| warfarin for CVA prophylaxis, INR 2.3 today                                               
                                                        |
|Plan:                                                                                      
                                                       |
|Continue Coreg with up-titration as tolerated                                              
                                                        |
|Amiodarone 400 mg BID while inpatient and then transition to 200 mg BID for 2 weeks and the
n 200 mg daily. Continue po 1-3 months post discharge.  |
|Continue warfarin for CVA prophylaxis, INR 2.3 today                                       
                                                        |
+-------------------------------------------------------------------------------------------
--------------------------------------------------------+
 
 
 
+--------------+------------+
| S/P CABG x 2 | 2018 |
+--------------+------------+
 
 
 
+-------------------------------------------------------------------------------------------
-----------+
|   Overview:   Overview: SVG's to OM and RCA at time of MV ring                            
           |
| Cristopher #28. POLY left.Last Assessment & Plan: 18: Mitral                            
           |
| valve repair with a 28 Cristopher annuloplasty ring; CABG x2 (SVG                           
           |
| to OM and SVG to RCA)Plan:ASA, statin, BB and ARB                                         
           |
|18: Mitral valve repair with a 28 Cristopher annuloplasty ring; CABG x2 (SVG to OM and S
VG to RCA) |
|                                                                                           
           |
|Plan:                                                                                      
          |
|ASA, statin, BB and ARB                                                                    
           |
+-------------------------------------------------------------------------------------------
-----------+
 
 
 
+-------------------------+------------+
| S/P mitral valve repair | 2018 |
+-------------------------+------------+
 
 
 
+----------------------------------------------------------------------------------------+
|   Overview:   Overview: Cristopher ring 2.16.18 ([place]) for severe                       |
| MR. POLY left. CABG too, SVG x 2 to OM and RCA.Last Assessment &                        |
| Plan: Cristopher ring 2.16.18 ([place]) for severe MR. POLY left. CABG                      |
|  too, SVG x 2 to OM and RCA.                                                           |
|Cristopher ring 2.16.18 ([place]) for severe MR. POLY left. CABG too, SVG x 2 to OM and RCA. |
 
+----------------------------------------------------------------------------------------+
 
 
 
+----------------------------------+------------+
| Osteomyelitis of left foot (HCC) | 2017 |
+----------------------------------+------------+
 
 
 
+-------------------------------------------------------------------+
|   Overview:   Added automatically from request for surgery [id number] |
+-------------------------------------------------------------------+
 
 
 
+-----------------------------------------------------+------------+
| Foot ulcer due to DM  (HCC)                         | 2017 |
+-----------------------------------------------------+------------+
| Severe protein-calorie malnutrition (HCC)           | 2017 |
+-----------------------------------------------------+------------+
| Loss of weight                                      | 2017 |
+-----------------------------------------------------+------------+
| Dysphagia, unspecified                              | 2017 |
+-----------------------------------------------------+------------+
| Diabetic foot ulcer (HCC)                           | 2017 |
+-----------------------------------------------------+------------+
| Dyslipidemia                                        | 2017 |
+-----------------------------------------------------+------------+
| Gastroesophageal reflux disease without esophagitis | 2017 |
+-----------------------------------------------------+------------+
| Hypertension, essential                             | 2016 |
+-----------------------------------------------------+------------+
 
 
 
+----------------------------------------------------+
|   Overview:   Last Assessment & Plan:              |
| Will resume metoprolol and losartan as BP allows.  |
+----------------------------------------------------+
 
 
 
+--------------------------------------+------------+
| ESRD (end stage renal disease) (formerly [facility]) | 2016 |
+--------------------------------------+------------+
 
 
 
+-------------------------------------------------------------------+
|   Overview:   Primary nephrologist is Dr. Asher. She was on PD    |
| and eventually converted to HD and is dialyzed TTS using left UE  |
| AVF at [facility].                                          |
+-------------------------------------------------------------------+
 
 
 
+------------------------------------------------------------------+------------+
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
| dialysis, with long-term current use of insulin (formerly [facility])            |            |
 
+------------------------------------------------------------------+------------+
| Anemia in ESRD (end-stage renal disease) (formerly [facility])                   | 2016 |
+------------------------------------------------------------------+------------+
| Proteinuria                                                      | 2011 |
+------------------------------------------------------------------+------------+
| Vitamin D deficiency                                             | 2011 |
+------------------------------------------------------------------+------------+
| Secondary hyperparathyroidism (formerly [facility])                              | 2011 |
+------------------------------------------------------------------+------------+
| Recurrent UTI                                                    | 2011 |
+------------------------------------------------------------------+------------+
| Coronary artery disease involving native coronary artery of      | 2011 |
| native heart without angina pectoris                             |            |
+------------------------------------------------------------------+------------+
 
 
 
+-------------------------------------------------------------------+
|   Overview:   Overview: [facility] on 2012 shows totally occluded   |
| right coronary artery with collateral filling from the left, no   |
| significant stenoses within the left anterior descending artery   |
| and circumflex artery. Patent stents within the left anterior     |
| descending artery, normal intracardiac pressure, mild narrowing   |
| within the distal aorta and iliac arteries. - Sherie Bartholomew,   |
| MDEchocardiogram on 2017 shows Study: a 2-dimensional        |
| transthoracic echocardiogram with m-mode, spectral and color flow |
|  Doppler was performed, left ventricular systolic function is     |
| low-normal with, an EF between 50 - 55 %, the left ventricle      |
| cavity size is normal, the RV is normal in size and function, the |
|  left atrium is normal in size, the right atrium is normal in     |
| size, aortic valve is trileaflet and is mildly thickened, there   |
| is mild aortic regurgitation, there is no evidence of aortic      |
| stenosis, the mitral valve is normal. - OLIVA Huffman |
|  on 2017 shows severe triple-vessel coronary artery disease   |
| with moderate ostial left anterior descending artery and patent   |
| stent in the midsegment, moderate stenosis in the second obtuse   |
| marginal branch and total occlusion of the proximal right         |
| coronary artery, which is known from before, given the patient's  |
| symptoms at this time, recommendation given. The patient          |
| understands. We will proceed with further medical management with |
|  blood pressure control. Also, we will optimize our blood         |
| pressure management. Further evaluation for the ostial left       |
| anterior descending artery and circumflex lesion upon recurrent   |
| symptoms for the patient, the patient will be evaluated for any   |
| further symptoms and fractional flow reserve of the left anterior |
|  descending artery and possible intervention on the left          |
| circumflex system will be considered. - OLIVA Diane   |
| on 2017 shows severe 3-vessel coronary artery disease with   |
| moderate to severe proximal left anterior descending with         |
| significant fractional flow reserve value of 0.77, severe mid     |
| large marginal branch, and chronically occluded right coronary    |
| artery with left-to-right collaterals, normal left ventricular    |
| end-diastolic pressure. - OLIVA Lambert on 2017     |
| shows three-vessel coronary artery disease with a chronically     |
| occluded right coronary artery with left-to-right collaterals,    |
| severe proximal left anterior descending artery with a patent     |
| stent in the mid left anterior descending artery and a            |
| significant FFR value of 0.77 during diagnostic angiogram on      |
| 2017, and severe disease of the second marginal branch |
|  of the left circumflex artery, successful PTCA and stenting of   |
 
| the second marginal branch of the left circumflex artery using a  |
| 2.25 x 16 and a 2.25 x 8 mm overlapping synergy drug-eluting      |
| stents postdilated using a 2.25 noncompliant balloon, successful  |
| direct stenting of the proximal and ostium of the left anterior   |
| descending artery using a 3.5 x 16 mm Synergy drug-eluting stent  |
| postdilated using a 3.5 noncompliant balloon. - Abdelazim Hashim, |
|  MDEchocardiogram on 2018 shows the left ventricle is normal |
|  in size, wall thickness and moderately impaired systolic         |
| function EF 35-40%. Inferior, inferolateral and anterolateral     |
| hypokinesis, the right ventricle is normal in size and function,  |
| severe mitral regurgitation with moderately dilated left atrium,  |
| mild tricuspid regurgitation and mild pulmonary hypertension RVSP |
|  48 mmHg, there is no pericardial effusion, new wall motion       |
| abnormalities and new severe mitral regurgitation compared to     |
| prior study. - Celestino Porras, MDL[facility] on 2018 shows         |
| three-vessel coronary artery disease with widely patent stent in  |
| the left anterior descending artery and severe stent restenosis   |
| in the marginal branch, occluded right coronary artery with       |
| collateral from left to right, severe left ventricular            |
| dysfunction with severe mitral regurgitation, status post         |
| percutaneous transluminal coronary angioplasty of in-stent        |
| restenosis within the marginal branch, recommend consideration    |
| for mitral valve replacement and 2-vessel coronary artery bypass  |
| surgery to the right coronary artery and marginal branch. - Iyad  |
| MD DustinTEE and CABG x2 on 2018 shows Severely reduced LV  |
| systolic function. Visually estimated LVEF 25%, normal LV size    |
| and shape. Normal wall thickness, normal RV size with normal      |
| function, LA enlarged. POLY normal size without SEC, masses, or    |
| thrombi. IAS intact, no PFO detected, mitral valve with bileaflet |
|  thickening and severely restricted motion, associated with       |
| ventricular tethering. Severe functional MR with central jet,     |
| trileaflet aortic valve with mild sclerosis. No significant       |
| aortic stenosis. Mild AI with central jet, tricuspid valve normal |
|  structure and function. Trace TR, pulmonic valve normal Trace    |
| MS, visible portions of ascending aorta and arch normal. Grade II |
|  atherosclerotic disease of descending aorta, pulmonary artery    |
| normal, normal pericardium. No pericardial or pleural effusions,  |
| left ventricular function slightly improved and right ventricular |
|  function unchanged compared to preop,  28 mm mitral valve        |
| annuloplasty ring is well-seated with an acceptable inflow        |
| gradient across the mitral valve and no MR noted, no change in    |
| the aortic, tricuspid, or pulmonic valves compared to preop, no   |
| change in visible portions of aorta after decannulation, LV and   |
| RV function unchanged after sternal closure. - Jagjit Hickey,       |
| MDEchocardiogram on 3/7/2018 shows a complete two-dimensional     |
| transthoracic echocardiogram was performed (2D, M-mode, Doppler   |
| and color flow Doppler), left ventricular systolic function is    |
| severely reduced, the visually estimated LV ejection fraction is  |
| 15%, the left and right atrial chambers are both normal in size,  |
| there is mild mitral regurgitation, an annuloplasty ring is noted |
|  in the mitral position, there is mild tricuspid regurgitation,   |
| Estimated PA pressure is 42 mmHg, the aortic valve leaflets       |
| appear mildly calcified, the aortic valve opens well, the aortic  |
| valve mean systolic gradient was measured at 9 mm Hg. - Star    |
| Missy Pino Assessment & Plan: [facility] in [facility] 5 months     |
| ago. 1 week off Plavix for CAGG/MVr surgery 2.16. Now and in the  |
| past with [facility] statin - data in dialysis patients for primary  |
| prevention with statin is not beneficial but this is secondary    |
| prevention. However, instead of high dose statin, moderate dose   |
| due to ESRD with slight increased risk of rhabdo. - Warfarin -    |
 
| ASA - Moderate dose Statin                                        |
+-------------------------------------------------------------------+
 
 
 
+------------+------------+
| Depression | 2011 |
+------------+------------+
 
 
 
 Resolved Problems
 
 
+----------------------------------------------------------------+----------+-----------+
| Problem                                                        | Noted    | Resolved  |
|                                                                | Date     | Date      |
+----------------------------------------------------------------+----------+-----------+
| Chest pain                                                     | 20 |  |
|                                                                | 19       | 9         |
+----------------------------------------------------------------+----------+-----------+
| Chest pain                                                     | 20 |  |
|                                                                | 18       | 8         |
+----------------------------------------------------------------+----------+-----------+
| Severe mitral regurgitation                                    | 20 |  |
|                                                                | 18       | 8         |
+----------------------------------------------------------------+----------+-----------+
| Precordial pain                                                | 20 |  |
|                                                                | 18       | 8         |
+----------------------------------------------------------------+----------+-----------+
| NSTEMI (non-ST elevated myocardial infarction) (HCC)           | 20 |  |
|                                                                | 17       | 8         |
+----------------------------------------------------------------+----------+-----------+
| Nausea and vomiting                                            | 20 |  |
|                                                                | 17       | 7         |
+----------------------------------------------------------------+----------+-----------+
| Abdominal pain, left upper quadrant                            | 20 |  |
|                                                                | 17       | 7         |
+----------------------------------------------------------------+----------+-----------+
| Partial small bowel obstruction (HCC)                          | 20 |  |
|                                                                | 17       | 7         |
+----------------------------------------------------------------+----------+-----------+
| Gastroenteritis                                                | 20 |  |
|                                                                | 17       | 7         |
+----------------------------------------------------------------+----------+-----------+
| Transient alteration of awareness                              | 20 |  |
|                                                                | 17       | 7         |
+----------------------------------------------------------------+----------+-----------+
| Pain of left hip joint                                         | 20 |  |
|                                                                | 17       | 7         |
+----------------------------------------------------------------+----------+-----------+
| Prolonged Q-T interval on ECG                                  | 20 |  |
|                                                                | 17       | 7         |
+----------------------------------------------------------------+----------+-----------+
| Hyperphosphatemia                                              | 20 |  |
|                                                                | 16       | 7         |
+----------------------------------------------------------------+----------+-----------+
| Fracture of left hip due to osteoporosis (HCC)                 | 20 |  |
|                                                                | 16       | 7         |
+----------------------------------------------------------------+----------+-----------+
 
| Closed fracture of base of fifth metatarsal bone of right foot | 20 |  |
|                                                                | 16       | 7         |
+----------------------------------------------------------------+----------+-----------+
| ESRD on peritoneal dialysis                                    | 20 |  |
|                                                                | 16       | 7         |
+----------------------------------------------------------------+----------+-----------+
| Acute abdominal pain                                           | 20 |  |
|                                                                | 16       | 7         |
+----------------------------------------------------------------+----------+-----------+
| Blood per rectum                                               | 20 |  |
|                                                                | 16       | 7         |
+----------------------------------------------------------------+----------+-----------+
| CKD (chronic kidney disease), stage V                          | 20 |  |
|                                                                | 11       | 6         |
+----------------------------------------------------------------+----------+-----------+
| Chronic diarrhea                                               | 20 |  |
|                                                                | 11       | 7         |
+----------------------------------------------------------------+----------+-----------+
 
 
 
+-------------------------+
|   Overview:   Resolved. |
+-------------------------+
 
 
 
+--------------------+----------+-----------+
| Stool incontinence | 20 |  |
|                    | 11       | 8         |
+--------------------+----------+-----------+
 
 
 
+-------------------------+
|   Overview:   Resolved. |
+-------------------------+
 
 
 
 Encounters
 
 
+--------+-------------+-----------+----------------------+----------------------+
| Date   | Type        | Specialty | Care Team            | Description          |
+--------+-------------+-----------+----------------------+----------------------+
| / | Documentati |           |   João Asher MD  | Other (February      |
|    | on Only     |           |                      | [facility] |
|        |             |           |                      |  Dialysis Rounding   |
|        |             |           |                      | Note)                |
+--------+-------------+-----------+----------------------+----------------------+
| / | Office      |           |   Srinivasan Abdi,  | Closed nondisplaced  |
| 2019   | Visit       |           | DPM                  | fracture of distal   |
|        |             |           |                      | phalanx of right     |
|        |             |           |                      | great toe with       |
|        |             |           |                      | routine healing,     |
|        |             |           |                      | subsequent encounter |
|        |             |           |                      |  (Primary Dx);       |
|        |             |           |                      | Post-operative       |
|        |             |           |                      | state; Open wound of |
 
|        |             |           |                      |  toe, subsequent     |
|        |             |           |                      | encounter            |
+--------+-------------+-----------+----------------------+----------------------+
| / | Documentati |           |   Peabody, Jessica   | Other ([facility]        |
|    | on Only     |           | NAHUN SPARKS                | medical records      |
|        |             |           |                      | request)             |
+--------+-------------+-----------+----------------------+----------------------+
| / | Office      |           |   Kirt Mclaughlin MD     | Steal syndrome as    |
| 2019   | Visit       |           |                      | complication of      |
|        |             |           |                      | dialysis access,     |
|        |             |           |                      | sequela (Primary     |
|        |             |           |                      | Dx); ESRD (end stage |
|        |             |           |                      |  renal disease)      |
|        |             |           |                      | (HCC); PAD           |
|        |             |           |                      | (peripheral artery   |
|        |             |           |                      | disease) (formerly [facility])       |
+--------+-------------+-----------+----------------------+----------------------+
| / | Orders Only |           |   Dionne Danielson MA  |                      |
|    |             |           |                      |                      |
+--------+-------------+-----------+----------------------+----------------------+
| / | Office      |           |   Srinivasan Abdi,  | Closed nondisplaced  |
|    | Visit       |           | DPM                  | fracture of distal   |
|        |             |           |                      | phalanx of right     |
|        |             |           |                      | great toe with       |
|        |             |           |                      | routine healing,     |
|        |             |           |                      | subsequent encounter |
|        |             |           |                      |  (Primary Dx);       |
|        |             |           |                      | Post-operative       |
|        |             |           |                      | state; Toe pain,     |
|        |             |           |                      | right                |
+--------+-------------+-----------+----------------------+----------------------+
| 02/15/ | Documentati |           |   João Asher MD  | Other (January       |
2019   | on Only     |           |                      | [facility] |
|        |             |           |                      |  Dialysis Rounding   |
|        |             |           |                      | Note)                |
+--------+-------------+-----------+----------------------+----------------------+
| / | Emergency   |           |   Cookson, Rachel M, | Fall in home,        |
| 2019 - |             |           |  DO Raya,       | initial encounter    |
|        |             |           | MD Ginny            | (Primary Dx);        |
| 02/15/ |             |           | Baltazar Isidro,   | Cellulitis of right  |
|    |             |           | MD                   | toe; Closed          |
|        |             |           |                      | displaced fracture   |
|        |             |           |                      | of distal phalanx of |
|        |             |           |                      |  right great toe,    |
|        |             |           |                      | initial encounter;   |
|        |             |           |                      | Generalized          |
|        |             |           |                      | weakness; Fatigue,   |
|        |             |           |                      | unspecified type     |
+--------+-------------+-----------+----------------------+----------------------+
 
 
 
+---+-------------+
|   |   Discharge |
|   |  Summaries  |
|   | -           |
|   | Sanna, |
|   |  MD Baltazar  |
|   | -           |
|   | 02/15/2019  |
 
|   |  2:09 PM    |
|   | PST         |
|   | Formatting  |
|   | of this     |
|   | note may be |
|   |  different  |
|   | from the    |
|   | original.Pa |
|   | tient:      |
|   | Cherie       |
|   | Apple     |
|   | Wilfredo      |
|   |     :    |
|   | 1950    |
|   |   MRN:      |
|   | [id number] |
|   | te of       |
|   | Admission:  |
|   |  2019  |
|   |  Date of    |
|   | Discharge:  |
|   |  2/15/2019  |
|   |   Treatment |
|   |  Team:      |
|   | Consulting  |
|   | Physician:  |
|   | Srinivasan APONTE     |
|   | Julian,    |
|   | DPMConsulti |
|   | ng          |
|   | Physician:  |
|   | Andrés Elliott, |
|   |             |
|   | MDConsultin |
|   | g           |
|   | Physician:  |
|   | João H      |
|   | Akoum,      |
|   | MDAdmitting |
|   |  Provider:  |
|   | Ginny       |
|   | Dorota,   |
|   | MDDischargi |
|   | ng          |
|   | Provider:   |
|   | BALTAZAR       |
|   | Renetta ISIDOR
|   |             |
|   | MDDischarge |
|   |  Diagnoses: |
|   |  Principal  |
|   | Problem:    |
|   | Falls       |
|   | frequentlyA |
|   | ctive       |
|   | Problems:   |
|   | Depression  |
|   |  ESRD (end  |
|   | stage renal |
|   |  disease)   |
 
|   | (HCC)  Type |
|   |  2 diabetes |
|   |  mellitus   |
|   | with        |
|   | chronic     |
|   | kidney      |
|   | disease on  |
|   | chronic     |
|   | dialysis,   |
|   | with        |
|   | long-term   |
|   | current use |
|   |  of insulin |
|   |  (HCC)      |
|   | Anemia in   |
|   | ESRD        |
|   | (end-stage  |
|   | renal       |
|   | disease)    |
|   | (HCC)       |
|   | Hypertensio |
|   | n,          |
|   | essential   |
|   | Dyslipidemi |
|   | a           |
|   | Gastroesoph |
|   | ageal       |
|   | reflux      |
|   | disease     |
|   | without     |
|   | esophagitis |
|   |   Loss of   |
|   | weight      |
|   | Severe      |
|   | protein-karen |
|   | orie        |
|   | malnutritio |
|   | n (HCC)     |
|   | Chronic     |
|   | systolic    |
|   | congestive  |
|   | heart       |
|   | failure     |
|   | (HCC)  COPD |
|   |  (chronic   |
|   | obstructive |
|   |  pulmonary  |
|   | disease)    |
|   | (HCC)  ICD  |
|   | (implantabl |
|   | e           |
|   | cardioverte |
|   | r-defibrill |
|   | ator) in    |
|   | place       |
|   | Paroxysmal  |
|   | atrial      |
|   | fibrillatio |
|   | n (HCC)     |
|   | S/P CABG x  |
 
|   | 2  S/P      |
|   | mitral      |
|   | valve       |
|   | repair  PVD |
|   |             |
|   | (peripheral |
|   |  vascular   |
|   | disease)    |
|   | (HCC)  CAD  |
|   | (coronary   |
|   | artery      |
|   | disease)    |
|   | Macrocytosi |
|   | s  Lactic   |
|   | acidosis    |
|   | Restless    |
|   | legs        |
|   | syndrome    |
|   | Overactive  |
|   | bladderReso |
|   | lved        |
|   | Problems:   |
|   | * No        |
|   | resolved    |
|   | hospital    |
|   | problems. * |
|   |             |
|   | Procedures  |
|   | Performed:C |
|   | hief        |
|   | Complaint:R |
|   | eferral     |
|   | (Dr. Elliott)  |
|   | and Skin    |
|   | Complaint   |
|   | (right foot |
|   |  )Hospital  |
|   | Course:     |
|   | Frequent    |
|   | falls:      |
|   | Assessment: |
|   |   It was    |
|   | unclear     |
|   | initially   |
|   | The exact   |
|   | culprit and |
|   |  it was     |
|   | felt        |
|   | potentially |
|   |  she was a  |
|   | bit dry (as |
|   |  per        |
|   | nephrology  |
|   | who saw her |
|   |  this       |
|   | admission)  |
|   | as she had  |
|   | just had    |
|   | ultrafiltra |
|   | tion with   |
 
|   | HD, versus  |
|   | other       |
|   | culprit.    |
|   | However the |
|   |  patient    |
|   | did state   |
|   | that when   |
|   | she has     |
|   | recently    |
|   | fallen it   |
|   | was in the  |
|   | setting of  |
|   | not using   |
|   | her walker  |
|   | as she had  |
|   | been        |
|   | instructed  |
|   | to as she   |
|   | was only    |
|   | going a     |
|   | very short  |
|   | distance    |
|   | and felt    |
|   | she did not |
|   |  need it.   |
|   | She will be |
|   |  very       |
|   | diligent    |
|   | about using |
|   |  her walker |
|   |  and taking |
|   |  all needed |
|   |             |
|   | precautions |
|   | .  Coronary |
|   |  artery     |
|   | disease     |
|   | with        |
|   | history of  |
|   | CABG,       |
|   | peripheral  |
|   | vascular    |
|   | disease,    |
|   | hypertensio |
|   | n and       |
|   | hyperlipide |
|   | mark with    |
|   | history of  |
|   | paroxysmal  |
|   | atrial      |
|   | fibrillatio |
|   | n on        |
|   | anticoagula |
|   | tion:.      |
|   | Will resume |
|   |  PTA meds   |
|   | as below on |
|   |  d/c        |
|   | Overactive  |
|   | bladder:    |
 
|   | Continue    |
|   | oxybutynin. |
|   |   Requip    |
|   | for         |
|   | restless    |
|   | leg         |
|   | syndrome.   |
|   | Anxiety     |
|   | depression: |
|   |             |
|   | recommended |
|   |  to Refrain |
|   |  from using |
|   |             |
|   | benzodiazep |
|   | inesas much |
|   |  as         |
|   | possible    |
|   | and per d/w |
|   |  nephrology |
|   |  will       |
|   | decrease    |
|   | the ativan  |
|   | dose.       |
|   | Diabetes    |
|   | mellitus    |
|   | with        |
|   | diabetic    |
|   | nephropathy |
|   |  with       |
|   | end-stage   |
|   | renal       |
|   | disease on  |
|   | hemodialysi |
|   | s: Continue |
|   |  with       |
|   | current     |
|   | regimen for |
|   |  now,       |
|   | follow, and |
|   |  adjust as  |
|   | needed      |
|   | based on    |
|   | progress.   |
|   | F/u with    |
|   | outpatient  |
|   | providers   |
|   | With        |
|   | respect to  |
|   | her left    |
|   | foot prior  |
|   | injury and  |
|   | prior       |
|   | antibiotics |
|   | :           |
|   | Assessment: |
|   |   She was   |
|   | seen by ID  |
|   | here and    |
|   | recently    |
 
|   | had         |
|   | completed a |
|   |  course of  |
|   | antbx       |
|   | reportedly. |
|   |   They      |
|   | would like  |
|   | to have her |
|   |  off antbx  |
|   | for now and |
|   |  f/u        |
|   | withthem.   |
|   | Should f/u  |
|   | with her    |
|   | podiatrist  |
|   | dr abdi  |
|   | for her     |
|   | foot as     |
|   | well.       |
|   | Discharge   |
|   | Exam and    |
|   | Data:Vital  |
|   | Signs:BP    |
|   | 112/57 (BP  |
|   | Location:   |
|   | Right upper |
|   |  arm)  |    |
|   | Pulse 66  | |
|   |  Temp 98    |
|   |   F (36.7   |
|   |   C) (Oral) |
|   |   | Resp 18 |
|   |   | Ht      |
|   | 1.778 m (5' |
|   |  10")  | Wt |
|   |  65.3 kg    |
|   | (144 lb)  | |
|   |  SpO2 96%   |
|   | |           |
|   | Breastfeedi |
|   | ng? No  |   |
|   | BMI 20.66   |
|   | kg/m   I&O  |
|   | Last 3      |
|   | Shifts:  No |
|   |             |
|   | intake/outp |
|   | ut data     |
|   | recorded.Ph |
|   | ysical      |
|   | Examination |
|   | :           |
|   | Constitutio |
|   | nal: Alert  |
|   | and         |
|   | oriented to |
|   |  person,    |
|   | place, and  |
|   | time.       |
|   | Appears     |
 
|   | well-develo |
|   | ped and     |
|   | well-nouris |
|   | hed. HEENT: |
|   |  Neck       |
|   | supple, no  |
|   | JVD, non    |
|   | icteric     |
|   | sclera.Card |
|   | iovascular: |
|   |  Normal     |
|   | rate,       |
|   | regular     |
|   | rhythm,     |
|   | normal      |
|   | heart       |
|   | sounds, and |
|   |  intact     |
|   | distal      |
|   | pulses.     |
|   | Exam        |
|   | reveals no  |
|   | appreciated |
|   |  gallop or  |
|   | friction    |
|   | rub.  No    |
|   | murmur      |
|   | heard.Pulmo |
|   | nary/Chest: |
|   |  Effort     |
|   | normal and  |
|   | breath      |
|   | sounds      |
|   | normal. No  |
|   | stridor. No |
|   |             |
|   | respiratory |
|   |  distress.  |
|   |  no         |
|   | wheezes. no |
|   |  rales.     |
|   | exhibits no |
|   |             |
|   | tenderness. |
|   |  Abdominal: |
|   |  Soft.      |
|   | Bowel       |
|   | sounds are  |
|   | normal.     |
|   | exhibits no |
|   |             |
|   | distension. |
|   |  There is   |
|   | no          |
|   | tenderness. |
|   |  There is   |
|   | no rebound  |
|   | and no      |
|   | guarding.   |
|   | Extremeties |
 
|   | /Musculoske |
|   | letal:      |
|   | Normal      |
|   | general     |
|   | range of    |
|   | motion.exhi |
|   | bits no     |
|   | tenderness. |
|   |   exhibits  |
|   | no edema.   |
|   | Left foot   |
|   | in boot     |
|   | wrapped in  |
|   | dressing,   |
|   | see wound   |
|   | care and ID |
|   |  eval .     |
|   | Neurologica |
|   | l:  Alert   |
|   | and         |
|   | oriented to |
|   |  person,    |
|   | place, and  |
|   | time. Has   |
|   | normal      |
|   | reflexes.   |
|   | No gross    |
|   | cranial     |
|   | nerve or    |
|   | focal       |
|   | deficit.    |
|   | Exhibits    |
|   | normal      |
|   | muscle      |
|   | tone.       |
|   | Coordinatio |
|   | n           |
|   | normal.Skin |
|   | : Skin is   |
|   | warm and    |
|   | dry. No     |
|   | rash noted. |
|   |  No         |
|   | erythema.   |
|   | No pallor.  |
|   | Psychiatric |
|   | : Has a     |
|   | normal mood |
|   |  and affect |
|   |  given      |
|   | situation.  |
|   | Behavior is |
|   |  normal.    |
|   | Judgment    |
|   | normal for  |
|   | patient.    |
|   | Recent Labs |
|   |  Recent     |
|   | LabsLab     |
|   |  |
 
|   | 9 WBC 5.14  |
|   | HGB 10.1*   |
|   | HCT 30.9*   |
|   | *    |
|   | Recent      |
|   | LabsLab     |
|   |  |
|   | 9  K  |
|   | 4.7 CL 96*  |
|   | CO2 >40*    |
|   | BUN 9       |
|   | CREATININE  |
|   | 2.96* No    |
|   | results for |
|   |  input(s):  |
|   | INR in the  |
|   | last 168    |
|   | hours.Resul |
|   | ts          |
|   | Procedure   |
|   | Component   |
|   | Value Units |
|   |  Date/Time  |
|   |  Blood      |
|   | Culture Set |
|   |  2          |
|   | [[id number]]  |
|   | Collected:  |
|   |  19   |
|   | 1739        |
|   | Specimen:   |
|   | Blood from  |
|   | Blood,      |
|   | peripheral  |
|   | draw        |
|   | Updated:    |
|   | 19    |
|   | 2005  Blood |
|   |  Culture    |
|   | Set 1       |
|   | [[id number]]  |
|   | Collected:  |
|   |  19   |
|   | 1655        |
|   | Specimen:   |
|   | Blood from  |
|   | Blood Line  |
|   | Draw        |
|   | Updated:    |
|   | 19    |
|   | 2004        |
|   | Recent      |
|   | Radiology   |
|   | ResultsXr   |
|   | Toe Right 2 |
|   |  ViewResult |
|   |  Date:      |
|   | 2019No |
|   |             |
|   | radiographi |
 
|   | c evidence  |
|   | of          |
|   | osteomyelit |
|   | is seen.    |
|   | If further  |
|   | assessment  |
|   | is          |
|   | warranted,  |
|   | consider    |
|   | correlation |
|   |  with MRI.  |
|   | Potential   |
|   | acute       |
|   | fracture    |
|   | through the |
|   |  base of    |
|   | the distal  |
|   | phalanx.    |
|   | Signed by:  |
|   | Habbu, Gavin |
|   |  Sign       |
|   | Date/Time:  |
|   | 2019  |
|   | 5:15        |
|   | PMOutstandi |
|   | ng Issues:  |
|   |  F/u with   |
|   | podiatry,   |
|   | ID, PCP and |
|   |  resume HD. |
|   |   Discharge |
|   |             |
|   | Information |
|   | :Follow     |
|   | up:[facility]   |
|   | C[address],      |
|   |  [address]     |
|   | [address],   |
|   | [place]         |
|   | [address] |
|   | [place]        |
|   | [id number] |
|   | [phone] |
|   | ule an      |
|   | appointment |
|   |  as soon as |
|   |  possible   |
|   | for a       |
|   | visitBrian  |
|   | L Abdi,  |
|   | [id number]      |
|   | [address], |
|   |  [place]        |
|   | [address] |
|   |  [place]         |
|   | [id number] |
|   | [phone] |
|   | ule an      |
|   | appointment |
|   |  as soon as |
 
|   |  possible   |
|   | for a       |
|   | visitplease |
|   |  continue   |
|   | prior to    |
|   | admission   |
|   | planJimmy   |
|   | Earl,       |
|   | [id number]    |
|   | [address]  |
|   | [address] |
|   | [place]       |
|   | [id number] |
|   | [phone] |
|   | lease call  |
|   | to see when |
|   |  they would |
|   |  like to    |
|   | see you in  |
|   | follow up   |
|   | resume care |
|   |  with all   |
|   | providers   |
|   | you were    |
|   | seeing      |
|   | prior to    |
|   | admission   |
|   | Medication  |
|   | List        |
|   | CHANGE how  |
|   | you take    |
|   | these       |
|   | medications |
|   |   LORazepam |
|   |  1 MG       |
|   | tabletRefil |
|   | ls:         |
|   | 0Commonly   |
|   | known as:   |
|   | ATIVANTake  |
|   | 0.5 tablets |
|   |  by mouth   |
|   | every 8     |
|   | (eight)     |
|   | hours as    |
|   | needed for  |
|   | Anxiety.    |
|   | Patient     |
|   | states she  |
|   | has the 1mg |
|   |  tablets at |
|   |  home and   |
|   | that they   |
|   | are scored  |
|   | and she     |
|   | will split  |
|   | them in     |
|   | halfWhat    |
|   | changed:    |
|   | how much to |
 
|   |  take       |
|   | additional  |
|   | instruction |
|   | s  CONTINUE |
|   |  taking     |
|   | these       |
|   | medications |
|   |   albuterol |
|   |  108 (90    |
|   | Base)       |
|   | MCG/ACT     |
|   | inhalerRefi |
|   | lls:        |
|   | 0Commonly   |
|   | known as:   |
|   | PROVENTIL   |
|   | HFA;VENTOLI |
|   | N HFA       |
|   | apixaban    |
|   | 2.5 MG      |
|   | tabletQTY:  |
|   |  60         |
|   | tabletRefil |
|   | ls:         |
|   | 0Commonly   |
|   | known as:   |
|   | ELIQUISTake |
|   |  1 tablet   |
|   | by mouth 2  |
|   | (two) times |
|   |  daily.     |
|   | atorvastati |
|   | n 80 MG     |
|   | tabletRefil |
|   | ls:         |
|   | 0Commonly   |
|   | known as:   |
|   | LIPITOR     |
|   | carvedilol  |
|   | 12.5 MG     |
|   | tabletQTY:  |
|   |  60         |
|   | tabletRefil |
|   | ls:         |
|   | 0Doctor's   |
|   | comments:   |
|   | Hold for    |
|   | SBP less    |
|   | than        |
|   | 100Hold for |
|   |  HR less    |
|   | than        |
|   | 60Commonly  |
|   | known as:   |
|   | COREGTake 1 |
|   |  tablet by  |
|   | mouth 2     |
|   | (two) times |
|   |  daily with |
|   |  meals.     |
 
|   | esomeprazol |
|   | e 40 MG     |
|   | capsuleRefi |
|   | lls:        |
|   | 0Commonly   |
|   | known as:   |
|   | NEXIUM      |
|   | HYDROcodone |
|   | -acetaminop |
|   | hen 5-325   |
|   | MG per      |
|   | tabletQTY:  |
|   |  30         |
|   | tabletRefil |
|   | ls:         |
|   | 0Commonly   |
|   | known as:   |
|   | NORCOTake 1 |
|   |  tablet by  |
|   | mouth every |
|   |  4 (four)   |
|   | hours as    |
|   | needed.     |
|   | insulin     |
|   | aspart 100  |
|   | UNIT/ML     |
|   | injectionRe |
|   | fills:      |
|   | 0Commonly   |
|   | known as:   |
|   | NOVOLOG     |
|   | insulin     |
|   | degludec    |
|   | 100 UNIT/ML |
|   |             |
|   | injectionRe |
|   | fills:      |
|   | 0Commonly   |
|   | known as:   |
|   | TRESIBA     |
|   | isosorbide  |
|   | mononitrate |
|   |  60 MG 24   |
|   | hr          |
|   | tabletQTY:  |
|   |  30         |
|   | tabletRefil |
|   | ls:  1Take  |
|   | 1 tablet by |
|   |  mouth      |
|   | daily.      |
|   | losartan    |
|   | 100 MG      |
|   | tabletRefil |
|   | ls:         |
|   | 0Commonly   |
|   | known as:   |
|   | COZAAR      |
|   | nitroGLYCER |
|   | IN 0.4 MG   |
 
|   | SL          |
|   | tabletQTY:  |
|   |  30         |
|   | tabletRefil |
|   | ls:         |
|   | 0Doctor's   |
|   | comments:   |
|   | Hold for    |
|   | SBP less    |
|   | than        |
|   | 100Commonly |
|   |  known as:  |
|   |             |
|   | NITROSTATPl |
|   | ace 1       |
|   | tablet      |
|   | under the   |
|   | tongue      |
|   | every 5     |
|   | (five)      |
|   | minutes as  |
|   | needed for  |
|   | Chest pain. |
|   |             |
|   | nortriptyli |
|   | ne 25 MG    |
|   | capsuleRefi |
|   | lls:        |
|   | 0Commonly   |
|   | known as:   |
|   | PAMELOR     |
|   | ondansetron |
|   |  4 MG       |
|   | disintegrat |
|   | ing         |
|   | tabletRefil |
|   | ls:         |
|   | 0Commonly   |
|   | known as:   |
|   | ZOFRAN-ODT  |
|   | oxybutynin  |
|   | 5 MG        |
|   | tabletRefil |
|   | ls:         |
|   | 0Commonly   |
|   | known as:   |
|   | DITROPAN    |
|   | prochlorper |
|   | azine 5 MG  |
|   | tabletQTY:  |
|   |  60         |
|   | tabletRefil |
|   | ls:         |
|   | 11Doctor's  |
|   | comments:   |
|   | Please      |
|   | consider 90 |
|   |  day        |
|   | supplies to |
|   |  promote    |
 
|   | better      |
|   | adherenceTA |
|   | KE ONE      |
|   | TABLET BY   |
|   | MOUTH TWICE |
|   |  DAILY AS   |
|   | NEEDED FOR  |
|   | NAUSEA      |
|   | rOPINIRole  |
|   | 0.25 MG     |
|   | tabletRefil |
|   | ls:         |
|   | 0Commonly   |
|   | known as:   |
|   | REQUIP      |
|   | TRINTELLIX  |
|   | 20 MG       |
|   | TabsRefills |
|   | :  0Generic |
|   |  drug:      |
|   | Vortioxetin |
|   | e HBr  You  |
|   | might also  |
|   | be taking   |
|   | other       |
|   | medications |
|   |  not listed |
|   |  above. If  |
|   | you have    |
|   | questions   |
|   | about any   |
|   | of your     |
|   | other       |
|   | medications |
|   | , talk to   |
|   | the person  |
|   | who         |
|   | prescribed  |
|   | them or     |
|   | your        |
|   | Primary     |
|   | Care        |
|   | Provider.   |
|   |    Where to |
|   |  Get Your   |
|   | Medications |
|   |             |
|   | Information |
|   |  about      |
|   | where to    |
|   | get these   |
|   | medications |
|   |  is not yet |
|   |  available  |
|   |  Ask your   |
|   | nurse or    |
|   | doctor      |
|   | about these |
|   |             |
|   | medications |
 
|   |             |
|   | LORazepam 1 |
|   |  MG tablet  |
|   | Disposition |
|   | :           |
|   | HomeConditi |
|   | on:         |
|   | StableCode  |
|   | Status:     |
|   | Full        |
|   | CodeDischar |
|   | ge took 35  |
|   | minutes, to |
|   |  include    |
|   | final       |
|   | examination |
|   | ,           |
|   | discussion  |
|   | of          |
|   | admission,  |
|   | and         |
|   | preparation |
|   |  of         |
|   | prescriptio |
|   | ns,         |
|   | instruction |
|   | s for       |
|   | on-going    |
|   | care,       |
|   | follow-up   |
|   | and         |
|   | documentati |
|   | on of       |
|   | discharge   |
|   | summary.[facility] |
|   | TT          |
|   | SANNA, |
|   |  MD2:09 PM  |
+---+-------------+
 
 
 
+--------+-------------+---+----------------------+----------------------+
| / | Emergency   |   |   Chau Deleon,  |                      |
| 2019   |             |   | MD                   |                      |
+--------+-------------+---+----------------------+----------------------+
| / | Office      |   |   Kirt Mclaughlin MD     | PAD (peripheral      |
| 2019   | Visit       |   |                      | artery disease)      |
|        |             |   |                      | (HCC) (Primary Dx);  |
|        |             |   |                      | ESRD (end stage      |
|        |             |   |                      | renal disease) (HCC) |
+--------+-------------+---+----------------------+----------------------+
| / | Telephone   |   |   Kristen Tam, |                      |
|    |             |   |  RN                  |                      |
+--------+-------------+---+----------------------+----------------------+
| / | Telephone   |   |   Srinivasan Abdi,  | Follow-up            |
|    |             |   | DPM                  | (appointment)        |
+--------+-------------+---+----------------------+----------------------+
| / | Orders Only |   |   Luisa Segovia   |                      |
|    |             |   | G, MA                |                      |
 
+--------+-------------+---+----------------------+----------------------+
| / | Hospital    |   |   Connie,        | PVD (peripheral      |
| 2019 - | Encounter   |   | MD Liat Jarrett, | vascular disease)    |
|        |             |   |  Moiz Porter MD    | (formerly [facility]) (Primary Dx);  |
| / |             |   |                      | ESRD (end stage      |
| 2019   |             |   |                      | renal disease) on    |
|        |             |   |                      | dialysis (formerly [facility]);      |
|        |             |   |                      | Anemia in ESRD       |
|        |             |   |                      | (end-stage renal     |
|        |             |   |                      | disease) (formerly [facility]);      |
|        |             |   |                      | Hypoalbuminemia;     |
|        |             |   |                      | Electrolyte          |
|        |             |   |                      | imbalance risk       |
+--------+-------------+---+----------------------+----------------------+
 
 
 
+---+-------------+
|   |   Discharge |
|   |  Summaries  |
|   | - Lyn,  |
|   | Prudence,     |
|   | MD-R2 -     |
|   | 2019  |
|   |  1:04 PM    |
|   | PST         |
|   | Formatting  |
|   | of this     |
|   | note may be |
|   |  different  |
|   | from the    |
|   | original.[facility] |
|   | [facility]        |
|   | [facility]    |
|   [facility]     |
|   [facility] |
|   | ce:         |
|   | Hospitalist |
|   | Resident    |
|   | Discharge   |
|   | SummaryDate |
|   |  of         |
|   | Admission:  |
|   |             |
|   | 2019Da |
|   | te of       |
|   | Discharge:  |
|   |             |
|   | 20191/ |
|   | Disc |
|   | harge       |
|   | Provider:   |
|   | Prudence      |
|   | Lyn,    |
|   | MD-V0Latyig |
|   | ent Team:   |
|   | Consulting  |
|   | Physician:  |
|   | Srinivasan APONTE     |
|   | Julian,    |
 
|   | DPMConsulti |
|   | ng          |
|   | Physician:  |
|   | Ethan      |
|   | Delmi,     |
|   | MDAdmitting |
|   |  Provider:  |
|   | Luiza      |
|   | Connie, |
|   |             |
|   | MDDischarge |
|   |  Diagnoses: |
|   |             |
|   | Principal   |
|   | Problem:    |
|   | PVD         |
|   | (peripheral |
|   |  vascular   |
|   | disease)    |
|   | (HCC)Active |
|   |  Problems:  |
|   |  ESRD (end  |
|   | stage renal |
|   |  disease)   |
|   | (HCC)  Type |
|   |  2 diabetes |
|   |  mellitus   |
|   | with        |
|   | chronic     |
|   | kidney      |
|   | disease on  |
|   | chronic     |
|   | dialysis,   |
|   | with        |
|   | long-term   |
|   | current use |
|   |  of insulin |
|   |  (HCC)      |
|   | Anemia in   |
|   | ESRD        |
|   | (end-stage  |
|   | renal       |
|   | disease)    |
|   | (HCC)       |
|   | Hypertensio |
|   | n,          |
|   | essential   |
|   | Chronic     |
|   | systolic    |
|   | congestive  |
|   | heart       |
|   | failure     |
|   | (HCC)  COPD |
|   |  (chronic   |
|   | obstructive |
|   |  pulmonary  |
|   | disease)    |
|   | (HCC)       |
|   | Paroxysmal  |
|   | atrial      |
 
|   | fibrillatio |
|   | n (HCC)     |
|   | CAD         |
|   | (coronary   |
|   | artery      |
|   | disease)Res |
|   | olved       |
|   | Problems:   |
|   | * No        |
|   | resolved    |
|   | hospital    |
|   | problems.   |
|   | *BRIEF      |
|   | HISTORY OF  |
|   | PRESENTATIO |
|   | N:          |
|   | Patient     |
|   | Summary:    |
|   | Per DrAdryan     |
|   | [facility] |
|   | [facility]   |
|   | from        |
|   | 2019:  |
|   | '67 y/o F   |
|   | with h/o    |
|   | ESRD on HD  |
|   | T,TH,Sat    |
|   | (        |
|   | [place]),     |
|   | DM2, PAD    |
|   | s/p         |
|   | angioplasty |
|   |  of LLE and |
|   |             |
|   | transmetata |
|   | rsal        |
|   | amputation  |
|   | of left     |
|   | foot        |
|   | 18 by |
|   |          |
|   | Abdi due |
|   |  to         |
|   | osteomyelit |
|   | is, CAD,    |
|   | s/p MI,     |
|   | CABG, AVR   |
|   | , HFrEF |
|   |  with last  |
|   | EF 20 to    |
|   | 25%, s/p    |
|   | AICD, AF on |
|   |  chronic    |
|   | anticoagula |
|   | tion who    |
|   | was sent to |
|   |  ED by   |
|   | [facility] for    |
|   | evaluation  |
|   | of right    |
 
|   | LE.         |
|   |   Patient   |
|   | reports     |
|   | that at HD  |
|   | yesterday   |
|   | it was      |
|   | noted that  |
|   | her right   |
|   | toes were   |
|   | red and     |
|   | dusky.      |
|   |   She went  |
|   | to see Dr.  |
|   | [facility] this   |
|   | morning and |
|   |  he was     |
|   | concerned   |
|   | that right  |
|   | toes could  |
|   | be ischemic |
|   |  or         |
|   | infected    |
|   | and         |
|   | referred to |
|   |  ED.        |
|   | 'HOSPITAL   |
|   | COURSE:     |
|   | Vascular    |
|   | surgery was |
|   |  consulted  |
|   | from the    |
|   | emergency   |
|   | department, |
|   |  they did a |
|   |  selective  |
|   | catheteriza |
|   | tion of the |
|   |  left       |
|   | common      |
|   | femoral     |
|   | artery and  |
|   | placed an   |
|   | SMA stent.  |
|   | The patient |
|   |  tolerated  |
|   | the         |
|   | procedure   |
|   | well.       |
|   | Podiatry    |
|   | was         |
|   | consulted,  |
|   | they        |
|   | recommended |
|   |  wound care |
|   |             |
|   | consultatio |
|   | n for the   |
|   | open wound  |
|   | on her left |
|   |  foot. They |
 
|   |  also       |
|   | stated that |
|   |  her right  |
|   | foot did    |
|   | not need a  |
|   | procedure   |
|   | for the     |
|   | toes at     |
|   | this time.  |
|   | Infectious  |
|   | disease is  |
|   | waiting for |
|   |  blood      |
|   | cultures    |
|   | and Gram    |
|   | stain and   |
|   | culture of  |
|   | the wound   |
|   | site to     |
|   | narrow her  |
|   | IV          |
|   | antibiotics |
|   | . She       |
|   | reported    |
|   | improved    |
|   | pain, no    |
|   | shortness   |
|   | of breath,  |
|   | no nausea   |
|   | and         |
|   | vomiting,   |
|   | she is      |
|   | making a    |
|   | small       |
|   | amount of   |
|   | urine and   |
|   | had a bowel |
|   |  movement   |
|   | today. She  |
|   | would like  |
|   | to go home. |
|   |  Physical   |
|   | therapy     |
|   | cleared her |
|   |  to go home |
|   |  with home  |
|   | assist.     |
|   | Nephrology  |
|   | was         |
|   | consulted   |
|   | and         |
|   | reported    |
|   | that they   |
|   | were        |
|   | amenable to |
|   |             |
|   | administeri |
|   | ng her IV   |
|   | antibiotics |
|   |  with       |
 
|   | dialysis.   |
|   | Infectious  |
|   | disease was |
|   |  consulted  |
|   | and agreed  |
|   | to this     |
|   | plan. Upon  |
|   | discharge   |
|   | the patient |
|   |  was        |
|   | eating,     |
|   | drinking,   |
|   | urinating,  |
|   | having      |
|   | bowel       |
|   | movements.s |
|   | he was not  |
|   | having      |
|   | fevers,     |
|   | nausea, or  |
|   | vomiting.   |
|   | No          |
|   | prescriptio |
|   | ns prior to |
|   |  admission. |
|   |  DISCHARGE  |
|   | EXAMVital   |
|   | Signs:BP    |
|   | 120/56 (BP  |
|   | Location:   |
|   | Right upper |
|   |  arm)  |    |
|   | Pulse 64  | |
|   |  Temp 97.5  |
|   |   F (36.4   |
|   |   C)        |
|   | (Axillary)  |
|   |  | Resp 18  |
|   |  | Ht 1.778 |
|   |  m (5' 10") |
|   |   | Wt 65.5 |
|   |  kg (144 lb |
|   |  6.4 oz)  | |
|   |  SpO2 97%   |
|   | |           |
|   | Breastfeedi |
|   | ng? No  |   |
|   | BMI 20.72   |
|   | kg/m        |
|   | Physical    |
|   | ExamGeneral |
|   |             |
|   | Appearance: |
|   |  Alert and  |
|   | cooperative |
|   | , sitting   |
|   | up in a     |
|   | chair by    |
|   | the bed and |
|   |  appears to |
 
|   |  be in no   |
|   | acute       |
|   | distress.   |
|   |   HEENNT:   |
|   | Normocephal |
|   | ic and      |
|   | atraumatic. |
|   |  Hearing    |
|   | grossly     |
|   | intact. No  |
|   | nasal       |
|   | discharge.  |
|   | No tracheal |
|   |  deviation. |
|   |             |
|   |   Cardiovas |
|   | cular:      |
|   | Rhythm and  |
|   | rate are    |
|   | regular.    |
|   | 2/6         |
|   | systolic    |
|   | murmur      |
|   | heard best  |
|   | over the    |
|   | left upper  |
|   | sternal     |
|   | border. No  |
|   | gallops or  |
|   | rubs        |
|   | appreciated |
|   | .           |
|   | Peripheral  |
|   | pulses 2+   |
|   | on the      |
|   | right. No   |
|   | lower       |
|   | extremity   |
|   | edema.  Pul |
|   | monary/Ches |
|   | t: Lungs    |
|   | are clear   |
|   | to          |
|   | auscultatio |
|   | n           |
|   | bilaterally |
|   | . Effort is |
|   |  normal.    |
|   | Normal      |
|   | breath      |
|   | sounds.     |
|   |   Abdominal |
|   | : Soft,     |
|   | nontender,  |
|   | nondistende |
|   | d.          |
|   | Normoactive |
|   |  bowel      |
|   | sounds. No  |
|   | guarding or |
 
|   |  rebound    |
|   | tenderness. |
|   |             |
|   |   Musculosk |
|   | eletal:     |
|   | Normal      |
|   | range of    |
|   | motion.     |
|   | Normal      |
|   | muscular    |
|   | development |
|   | . Patient   |
|   | with        |
|   | forefoot    |
|   | amputation  |
|   | on the      |
|   | left. Left  |
|   | foot        |
|   | wrapped in  |
|   | new         |
|   | dressing,   |
|   | C/D/I.      |
|   | Right foot  |
|   | with        |
|   | erythema    |
|   | over the    |
|   | great big   |
|   | toe and     |
|   | cyanosis    |
|   | over the    |
|   | second toe, |
|   |  improved   |
|   | from prior. |
|   |             |
|   | Neurologica |
|   | l: CN       |
|   | II-XII      |
|   | grossly     |
|   | intact.     |
|   | Oriented to |
|   |  person,    |
|   | place, and  |
|   | time.       |
|   |   Skin:     |
|   | Skin is     |
|   | warm and    |
|   | dry. No     |
|   | rash noted. |
|   |             |
|   |   Psychiatr |
|   | ic:The      |
|   | patient was |
|   |  able to    |
|   | demonstrate |
|   |  good       |
|   | judgement   |
|   | and reason, |
|   |  without    |
|   | hallucinati |
|   | ons,        |
 
|   | abnormal    |
|   | affect or   |
|   | abnormal    |
|   | behaviors   |
|   | during the  |
|   | examination |
|   | .           |
|   | DATARecent  |
|   | LabsLab     |
|   |  |
|   | 8           |
|   |  |
|   | 3           |
|   |  |
|   | 3 WBC 3.90  |
|   | 3.39* 5.53  |
|   | HGB 9.8*    |
|   | 9.5* 9.4*   |
|   | HCT 30.1*   |
|   | 29.2* 28.4* |
|   |  *   |
|   | 125* 147*   |
|   | NEUTOPHILPC |
|   | T  --       |
|   | 66.95  --   |
|   | MONOPCT  -- |
|   |   11.96  -- |
|   |   Recent    |
|   | LabsLab     |
|   |  |
|   | 8           |
|   |  |
|   | 3           |
|   |  |
|   | 3     |
|   | 140 139 K   |
|   | 3.9 4.2 4.0 |
|   |  CL 97* 101 |
|   |  100 CO2 31 |
|   |  28 29 BUN  |
|   | 13 26* 23   |
|   | CREATININE  |
|   | 4.8* 6.8*   |
|   | 6.1* PROT   |
|   | --   --     |
|   | 6.2*        |
|   | BILITOT  -- |
|   |    --  0.4  |
|   | ALT  --     |
|   | --  21 AST  |
|   |  --   --    |
|   | 24          |
|   | Phosphorus: |
|   |   Lab       |
|   | Results     |
|   | Component   |
|   | Value Date  |
|   |  PHOS 3.6   |
|   | 2019  |
|   | Invalid     |
 
|   | input(s):   |
|   | LABALBURece |
|   | nt LabsLab  |
|   |  |
|   | 8 MG 2.3    |
|   | Recent      |
|   | LabsLab     |
|   |  |
|   | 3 CKTOTAL   |
|   | 28*         |
|   | Intake/Outp |
|   | ut Summary  |
|   | (Last 24    |
|   | hours) at   |
|   | 19    |
|   | 1717Last    |
|   | data filed  |
|   | at 19 |
|   |  0850 Gross |
|   |  per 24     |
|   | hour Intake |
|   |             |
|   |    380 ml   |
|   | Output      |
|   |             |
|   | 0 ml Net    |
|   |             |
|   | 380 ml      |
|   | Microbiolog |
|   | y: Blood    |
|   | cultures -  |
|   | NGTDRadiolo |
|   | gyUs Lower  |
|   | Extremty    |
|   | Arterial    |
|   | RightResult |
|   |  Date:      |
|   | 20191. |
|   |  Right leg  |
|   | shows       |
|   | biphasic    |
|   | inflow with |
|   |  a greater  |
|   | than 50%    |
|   | stenosis at |
|   |  the origin |
|   |  of the     |
|   | superficial |
|   |  femoral    |
|   | artery      |
|   | (137-309).  |
|   |  Biphasic   |
|   | outflow. 2. |
|   |  Two vessel |
|   |  runoff to  |
|   | the right   |
|   | foot.       |
|   | Signed by:  |
|   | Iuliano,    |
|   | Edward Sign |
 
|   |  Date/Time: |
|   |  2019 |
|   |  3:11       |
|   | PMPLANDispo |
|   | sition:     |
|   | HomeConditi |
|   | on:         |
|   | StableCode  |
|   | Status:     |
|   | Full CodeNo |
|   |  discharge  |
|   | procedures  |
|   | on          |
|   | file.Follow |
|   |  up:[facility]  |
|   | [facility]      |
|   | [facility]    |
|   | [id number] |
|   |  [facility]   |
|   | Dr Nhan      |
|   | [place]   |
|   | [place]  |
|   | [id number] |
|   | 2-9 |
|   | Follow up   |
|   | in 3        |
|   | week(s)[address] |
|   | li [address],   |
|   |  [address]     |
|   | [address],   |
|   | [place]         |
|   | [address] |
|   | [place]        |
|   | [id number] |
|   | [phone] |
|   | [address],    |
|   |  [address]     |
|   | [address],   |
|   | [place]         |
|   | [address] |
|   | [place]        |
|   | [id number] |
|   | 1-10In 1  |
|   | weekJimmy   |
|   | [place],       |
|   | [id number]    |
|   | [address]  |
|   | [address] |
|   | [place]       |
|   | [phone] |
|   | [phone]  |
|   | office for  |
|   | appointment |
|   |  Medication |
|   |  List       |
|   | START       |
|   | taking      |
|   | these       |
|   | medications |
|   |             |
 
|   | cefTAZidime |
|   |  2 g        |
|   | injectionQT |
|   | Y:  1       |
|   | eachRefills |
|   | :           |
|   | 0Commonly   |
|   | known as:   |
|   | FORTAZInjec |
|   | t 2 g into  |
|   | the muscle  |
|   | After       |
|   | Hemodialysi |
|   | s (see      |
|   | admin       |
|   | instruction |
|   | s) for 7    |
|   | days.       |
|   | vancomycin  |
|   | IVPBRefills |
|   | :           |
|   | 0Commonly   |
|   | known as:   |
|   | VANCOCINInj |
|   | ect 750 mg  |
|   | into the    |
|   | vein After  |
|   | Hemodialysi |
|   | s (see      |
|   | admin       |
|   | instruction |
|   | s) for 7    |
|   | days.       |
|   | Dilute in   |
|   | appropriate |
|   |  volume of  |
|   | IV fluid    |
|   | and give by |
|   |  slow IV    |
|   | infusion.   |
|   | CHANGE how  |
|   | you take    |
|   | these       |
|   | medications |
|   |   losartan  |
|   | 25 MG       |
|   | tabletQTY:  |
|   |  30         |
|   | tabletRefil |
|   | ls:         |
|   | 0Commonly   |
|   | known as:   |
|   | COZAARTake  |
|   | 1 tablet by |
|   |  mouth      |
|   | daily.What  |
|   | changed:    |
|   | how much to |
|   |  take       |
|   | CONTINUE    |
 
|   | taking      |
|   | these       |
|   | medications |
|   |   *         |
|   | albuterol   |
|   | 108 (90     |
|   | Base)       |
|   | MCG/ACT     |
|   | inhalerRefi |
|   | lls:        |
|   | 0Commonly   |
|   | known as:   |
|   | PROVENTIL   |
|   | HFA;VENTOLI |
|   | N HFA *     |
|   | VENTOLIN    |
|   |  (90 |
|   |  Base)      |
|   | MCG/ACT     |
|   | inhalerRefi |
|   | lls:        |
|   | 0Generic    |
|   | drug:       |
|   | albuterol   |
|   | apixaban    |
|   | 2.5 MG      |
|   | tabletQTY:  |
|   |  60         |
|   | tabletRefil |
|   | ls:         |
|   | 0Commonly   |
|   | known as:   |
|   | ELIQUISTake |
|   |  1 tablet   |
|   | by mouth 2  |
|   | (two) times |
|   |  daily.     |
|   | atorvastati |
|   | n 40 MG     |
|   | tabletQTY:  |
|   |  30         |
|   | tabletRefil |
|   | ls:         |
|   | 0Commonly   |
|   | known as:   |
|   | LIPITORTake |
|   |  1 tablet   |
|   | by mouth    |
|   | nightly.    |
|   | bisacodyl   |
|   | 10 MG       |
|   | suppository |
|   | Refills:    |
|   | 0Commonly   |
|   | known as:   |
|   | DULCOLAX    |
|   | calcium     |
|   | acetate 667 |
|   |  MG         |
|   | capsuleRefi |
 
|   | lls:        |
|   | 0Commonly   |
|   | known as:   |
|   | PHOSLO      |
|   | carvedilol  |
|   | 12.5 MG     |
|   | tabletQTY:  |
|   |  60         |
|   | tabletRefil |
|   | ls:         |
|   | 0Doctor's   |
|   | comments:   |
|   | Hold for    |
|   | SBP less    |
|   | than        |
|   | 100Hold for |
|   |  HR less    |
|   | than        |
|   | 60Commonly  |
|   | known as:   |
|   | COREGTake 1 |
|   |  tablet by  |
|   | mouth 2     |
|   | (two) times |
|   |  daily with |
|   |  meals.     |
|   | clopidogrel |
|   |  75 MG      |
|   | tabletQTY:  |
|   |  30         |
|   | tabletRefil |
|   | ls:         |
|   | 11Commonly  |
|   | known as:   |
|   | PLAVIXTake  |
|   | 1 tablet by |
|   |  mouth      |
|   | daily.      |
|   | esomeprazol |
|   | e 20 MG     |
|   | capsuleRefi |
|   | lls:        |
|   | 0Commonly   |
|   | known as:   |
|   | NEXIUM      |
|   | HYDROcodone |
|   | -acetaminop |
|   | hen 5-325   |
|   | MG per      |
|   | tabletQTY:  |
|   |  30         |
|   | tabletRefil |
|   | ls:         |
|   | 0Commonly   |
|   | known as:   |
|   | NORCOTake 1 |
|   |  tablet by  |
|   | mouth every |
|   |  4 (four)   |
|   | hours as    |
 
|   | needed.     |
|   | insulin     |
|   | aspart 100  |
|   | UNIT/ML     |
|   | injectionRe |
|   | fills:      |
|   | 0Commonly   |
|   | known as:   |
|   | NOVOLOG     |
|   | insulin     |
|   | degludec    |
|   | 100 UNIT/ML |
|   |             |
|   | injectionRe |
|   | fills:      |
|   | 0Commonly   |
|   | known as:   |
|   | TRESIBA     |
|   | isosorbide  |
|   | mononitrate |
|   |  60 MG 24   |
|   | hr          |
|   | tabletQTY:  |
|   |  30         |
|   | tabletRefil |
|   | ls:  1Take  |
|   | 1 tablet by |
|   |  mouth      |
|   | daily.      |
|   | loperamide  |
|   | 2 MG        |
|   | capsuleRefi |
|   | lls:        |
|   | 0Commonly   |
|   | known as:   |
|   | IMODIUM     |
|   | LORazepam 1 |
|   |  MG         |
|   | tabletQTY:  |
|   |  30         |
|   | tabletRefil |
|   | ls:         |
|   | 0Commonly   |
|   | known as:   |
|   | ATIVANTake  |
|   | 1 tablet by |
|   |  mouth      |
|   | every 8     |
|   | (eight)     |
|   | hours as    |
|   | needed for  |
|   | Anxiety.    |
|   | nitroGLYCER |
|   | IN 0.4 MG   |
|   | SL          |
|   | tabletQTY:  |
|   |  30         |
|   | tabletRefil |
|   | ls:         |
|   | 0Doctor's   |
 
|   | comments:   |
|   | Hold for    |
|   | SBP less    |
|   | than        |
|   | 100Commonly |
|   |  known as:  |
|   |             |
|   | NITROSTATPl |
|   | ace 1       |
|   | tablet      |
|   | under the   |
|   | tongue      |
|   | every 5     |
|   | (five)      |
|   | minutes as  |
|   | needed for  |
|   | Chest pain. |
|   |             |
|   | nortriptyli |
|   | ne 25 MG    |
|   | capsuleRefi |
|   | lls:        |
|   | 0Commonly   |
|   | known as:   |
|   | PAMELOR     |
|   | ondansetron |
|   |  4 MG       |
|   | disintegrat |
|   | ing         |
|   | tabletRefil |
|   | ls:         |
|   | 0Commonly   |
|   | known as:   |
|   | ZOFRAN-ODT  |
|   | oxybutynin  |
|   | 5 MG        |
|   | tabletRefil |
|   | ls:         |
|   | 0Commonly   |
|   | known as:   |
|   | DITROPAN    |
|   | prochlorper |
|   | azine 5 MG  |
|   | tabletQTY:  |
|   |  60         |
|   | tabletRefil |
|   | ls:         |
|   | 11Doctor's  |
|   | comments:   |
|   | Please      |
|   | consider 90 |
|   |  day        |
|   | supplies to |
|   |  promote    |
|   | better      |
|   | adherenceTA |
|   | KE ONE      |
|   | TABLET BY   |
|   | MOUTH TWICE |
|   |  DAILY AS   |
 
|   | NEEDED FOR  |
|   | NAUSEA      |
|   | ranitidine  |
|   | 150 MG      |
|   | tabletRefil |
|   | ls:         |
|   | 0Commonly   |
|   | known as:   |
|   | ZANTAC      |
|   | rOPINIRole  |
|   | 0.25 MG     |
|   | tabletRefil |
|   | ls:         |
|   | 0Commonly   |
|   | known as:   |
|   | REQUIP      |
|   | SLOW-MAG    |
|   | 71.5-119 MG |
|   |             |
|   | TbecRefills |
|   | :  0Generic |
|   |  drug:      |
|   | Magnesium   |
|   | Cl-Calcium  |
|   | Carbonate   |
|   | Vitamin D3  |
|   | 5000 units  |
|   | CapsRefills |
|   | :  0        |
|   | Vortioxetin |
|   | e HBr 10 MG |
|   |             |
|   | TabsRefills |
|   | :  0  *     |
|   | This list   |
|   | has 2       |
|   | medication( |
|   | s) that are |
|   |  the same   |
|   | as other    |
|   | medications |
|   |  prescribed |
|   |  for you.   |
|   | Read the    |
|   | directions  |
|   | carefully,  |
|   | and ask     |
|   | your doctor |
|   |  or other   |
|   | care        |
|   | provider to |
|   |  review     |
|   | them with   |
|   | you.    You |
|   |  might also |
|   |  be taking  |
|   | other       |
|   | medications |
|   |  not listed |
|   |  above. If  |
 
|   | you have    |
|   | questions   |
|   | about any   |
|   | of your     |
|   | other       |
|   | medications |
|   | , talk to   |
|   | the person  |
|   | who         |
|   | prescribed  |
|   | them or     |
|   | your        |
|   | Primary     |
|   | Care        |
|   | Provider.   |
|   |   STOP      |
|   | taking      |
|   | these       |
|   | medications |
|   |   aspirin   |
|   | 81 MG EC    |
|   | tablet      |
|   | Where to    |
|   | Get Your    |
|   | Medications |
|   |             |
|   | Information |
|   |  about      |
|   | where to    |
|   | get these   |
|   | medications |
|   |  is not yet |
|   |  available  |
|   |  Ask your   |
|   | nurse or    |
|   | doctor      |
|   | about these |
|   |             |
|   | medications |
|   |             |
|   | cefTAZidime |
|   |  2 g        |
|   | injection   |
|   |  vancomycin |
|   |  IVPB       |
|   | Prudence      |
|   | Lyn,    |
|   | MD-R2 |
|   | 0195:17 PM  |
+---+-------------+
 
 
 
+--------+-------------+---+----------------------+----------------------+
| /  Procedure   |   |                      |                      |
| 2019   | Pass        |   |                      |                      |
+--------+-------------+---+----------------------+----------------------+
| / | Procedure   |   |                      |                      |
|    | Pass        |   |                      |                      |
+--------+-------------+---+----------------------+----------------------+
 
| / | Procedure   |   |                      |                      |
|    | Pass        |   |                      |                      |
+--------+-------------+---+----------------------+----------------------+
| / | Orders Only |   |   Jessica Marley,   |                      |
| 2019   |             |   | [facility]A                 |                      |
+--------+-------------+---+----------------------+----------------------+
| / | Procedure   |   |                      |                      |
|    | Pass        |   |                      |                      |
+--------+-------------+---+----------------------+----------------------+
| / | Refill      |   |   João Asher MD  |                      |
| 2019   |             |   |                      |                      |
+--------+-------------+---+----------------------+----------------------+
| / | Telephone   |   |   Srinivasan Abdi,  | Medication Problem   |
|    |             |   | DPM                  | (Requesting Rx for   |
|        |             |   |                      | Wheelchair)          |
+--------+-------------+---+----------------------+----------------------+
| / | Emergency   |   |   Nathaniel De Luna S, | Hx of CABG (Primary  |
|  - |             |   |  DO  [facility],  | Dx); Chest pain in   |
|        |             |   | DO  Silas Ferrara MD  | adult; S/P MVR       |
|  |             |   |                      | (mitral valve        |
|    |             |   |                      | repair); Elevated    |
|        |             |   |                      | blood pressure       |
|        |             |   |                      | reading; Chronic     |
|        |             |   |                      | systolic congestive  |
|        |             |   |                      | heart failure (HCC); |
|        |             |   |                      |  Chest pain,         |
|        |             |   |                      | unspecified type     |
+--------+-------------+---+----------------------+----------------------+
 
 
 
+---+-------------+
|   |   Discharge |
|   |  Summaries  |
|   | - Alem,    |
|   | MD Silas -  |
|   | 2019  |
|   |  8:20 AM    |
|   | PST         |
|   | Formatting  |
|   | of this     |
|   | note may be |
|   |  different  |
|   | from the    |
|   | original.[facility] |
|   | [facility]        |
|   [facility]    [facility]
|   [facility]     |
|   [facility] |
|   | ce:         |
|   | Hospitalist |
|   | Discharge   |
|   | SummaryDate |
|   |  of         |
|   | Admission:  |
|   |             |
|   | 2019Da |
|   | te of       |
|   | Discharge:  |
|   |             |
 
|   | 2019Di |
|   | scharge     |
|   | Physician:  |
|   |  Arwa Z     |
|   | Alem,      |
|   | MDTreatment |
|   |  Team:      |
|   | Admitting   |
|   | Provider:   |
|   | Negro A      |
|   | Brown,      |
|   | DODischarge |
|   |  Diagnoses: |
|   |             |
|   | Principal   |
|   | Problem:    |
|   | Chest       |
|   | painActive  |
|   | Problems:   |
|   | Coronary    |
|   | artery      |
|   | disease     |
|   | involving   |
|   | native      |
|   | coronary    |
|   | artery of   |
|   | native      |
|   | heart       |
|   | without     |
|   | angina      |
|   | pectoris    |
|   | ESRD (end   |
|   | stage renal |
|   |  disease)   |
|   | (HCC)  Type |
|   |  2 diabetes |
|   |  mellitus   |
|   | with        |
|   | chronic     |
|   | kidney      |
|   | disease on  |
|   | chronic     |
|   | dialysis,   |
|   | with        |
|   | long-term   |
|   | current use |
|   |  of insulin |
|   |  (HCC)      |
|   | Anemia in   |
|   | ESRD        |
|   | (end-stage  |
|   | renal       |
|   | disease)    |
|   | (HCC)       |
|   | Hypertensio |
|   | n,          |
|   | essential   |
|   | Pleural     |
|   | effusion    |
|   | Chronic     |
 
|   | systolic    |
|   | congestive  |
|   | heart       |
|   | failure     |
|   | (HCC)       |
|   | Chronic     |
|   | diarrhea    |
|   | Chronic     |
|   | anticoagula |
|   | tion        |
|   | Cardiomyopa |
|   | thy (HCC)   |
|   | COPD        |
|   | (chronic    |
|   | obstructive |
|   |  pulmonary  |
|   | disease)    |
|   | (HCC)  ICD  |
|   | (implantabl |
|   | e           |
|   | cardioverte |
|   | r-defibrill |
|   | ator) in    |
|   | place       |
|   | Paroxysmal  |
|   | atrial      |
|   | fibrillatio |
|   | n (HCC)     |
|   | Chronic     |
|   | multifocal  |
|   | osteomyelit |
|   | is of left  |
|   | foot (HCC)  |
|   |  PVD        |
|   | (peripheral |
|   |  vascular   |
|   | disease)    |
|   | (HCC)Resolv |
|   | ed          |
|   | Problems:   |
|   | * No        |
|   | resolved    |
|   | hospital    |
|   | problems.   |
|   | *Procedures |
|   | :  * No     |
|   | surgery     |
|   | found       |
|   | *Significan |
|   | t           |
|   | Diagnostic  |
|   | Studies:    |
|   | No results  |
|   | found.BRIEF |
|   |  HISTORY OF |
|   |             |
|   | PRESENTATIO |
|   | N:          |
|   | Cherie       |
|   | Apple     |
 
|   | Wilfredo is a |
|   |  68 y.o.    |
|   | female who  |
|   | presented   |
|   | per         |
|   | H&P"from    |
|   | [facility] |
|   |  because of |
|   |  chest pain |
|   |  that       |
|   | started     |
|   | last night  |
|   | about       |
|   | midnight    |
|   | and lasted  |
|   | until about |
|   |  5 AM this  |
|   | morning,    |
|   | pressure,   |
|   | intense,    |
|   | nonradiatin |
|   | g, with     |
|   | nausea and  |
|   | dyspnea, no |
|   |  sweating.  |
|   | Improved    |
|   | with SL NTG |
|   |  initially, |
|   |  but the    |
|   | NTG's       |
|   | became less |
|   |  effective  |
|   | as she took |
|   |  more of    |
|   | them (5     |
|   | total). Got |
|   |  a NTG      |
|   | patch at    |
|   | [facility]        |
|   | [facility] |
|   |  which was  |
|   | very        |
|   | effective   |
|   | in          |
|   | controlling |
|   |  the pain.  |
|   | She has     |
|   | very        |
|   | complicated |
|   |  cardiac    |
|   | history     |
|   | including   |
|   | CAD, CABG,  |
|   | systolic    |
|   | CHF,        |
|   | ischemic    |
|   | cardiomyopa |
|   | thy,        |
|   | pacemaker,  |
|   | valve       |
 
|   | repair,     |
|   | afib,       |
|   | anticoagula |
|   | tion, with  |
|   | several     |
|   | other       |
|   | comorbiditi |
|   | es          |
|   | including   |
|   | COPD, ESRD  |
|   | on HD, DM2, |
|   |  PVD. She   |
|   | has a       |
|   | cardiologis |
|   | t in [place]  [place]
|   [place] but   |
|   | [facility]        |
|   | [facility]    |
|   | refused to  |
|   | call that   |
|   | doctor to   |
|   | aid in      |
|   | decision-ma |
|   | nichelle and    |
|   | simply      |
|   | transferred |
|   |  the        |
|   | patient to  |
|   | [facility]      |
|   | ED.  The    |
|   | patient was |
|   |  at [facility]  |
|   | a month ago |
|   |  for L foot |
|   |  toe        |
|   | amputation  |
|   | due to      |
|   | osteomyelit |
|   | is. Dr      |
|   | Abdi is  |
|   | her         |
|   | surgeon.    |
|   | During that |
|   |             |
|   | hospitaliza |
|   | tion she    |
|   | c/o chest   |
|   | pain with   |
|   | troponin    |
|   | elevation   |
|   | to 0.318    |
|   | and was     |
|   | evaluated   |
|   | by          |
|   | cardiology. |
|   |  Stress     |
|   | test a      |
|   | month prior |
|   |  had been   |
|   | normal. It  |
 
|   | was decided |
|   |  that       |
|   | medical     |
|   | management  |
|   | was the     |
|   | only        |
|   | reasonable  |
|   | option for  |
|   | the         |
|   | patient,    |
|   | given her   |
|   | extensive   |
|   | comorbiditi |
|   | es.  Post-a |
|   | mputation   |
|   | she has     |
|   | been        |
|   | instructed  |
|   | to be       |
|   | strict NWB  |
|   | on LLE, and |
|   |  she was    |
|   | sent to     |
|   | [facility]     |
|   | [facility]. She    |
|   | remains on  |
|   | abx         |
|   | post-proced |
|   | ure managed |
|   |  by Dr      |
|   | Earl. The   |
|   | patient is  |
|   | convinced   |
|   | that the    |
|   | SNF is not  |
|   | administeri |
|   | ng her meds |
|   |  correctly. |
|   |  She thinks |
|   |  they might |
|   |  be         |
|   | skipping    |
|   | her         |
|   | Eliquis,    |
|   | clopidogrel |
|   | , and       |
|   | carvedilol. |
|   |  This       |
|   | happened    |
|   | before,     |
|   | earlier in  |
|   | 2018. She   |
|   | says the    |
|   | nurses      |
|   | chart that  |
|   | the meds    |
|   | are given   |
|   | but they    |
|   | are not     |
|   | given. She  |
 
|   | does not    |
|   | want to     |
|   | return to   |
|   | [facility], if |
|   |  at all     |
|   | possible,   |
|   | and wants   |
|   | to return   |
|   | home with   |
|   | paid        |
|   | caregiver   |
|   | if that     |
|   | will comply |
|   |  with Dr    |
|   | Abdi's   |
|   | post-op     |
|   | instruction |
|   | s"HOSPITAL  |
|   | COURSE:     |
|   | She was     |
|   | seen by     |
|   |  and |
|   |  he recc to |
|   |  stop all   |
|   | IV abx.She  |
|   | was         |
|   | discharged  |
|   | home with   |
|   | full time   |
|   | care giver. |
|   |  She was    |
|   | seen by     |
|   |  |
|   |  and he     |
|   | recc        |
|   | medical     |
|   | management  |
|   | Patient was |
|   |  discharged |
|   |  in stable  |
|   | condition.  |
|   | Addressed   |
|   | all of      |
|   | their       |
|   | questions   |
|   | and covered |
|   |  with side  |
|   | affects of  |
|   | newly added |
|   |             |
|   | medications |
|   | . she was   |
|   | cleared per |
|   |  consulting |
|   |             |
|   | services.Pa |
|   | st Medical  |
|   | History     |
|   | Diagnosis   |
|   | Date        |
 
|   |   Acute MI  |
|   | (HCC)   |
|   |  with       |
|   | stenting x  |
|   | 1           |
|   |   Anemia    |
|   | associated  |
|   | with        |
|   | chronic     |
|   | renal       |
|   | failure     |
|   | 2016   |
|   |             |
|   |   Atrial    |
|   | fibrillatio |
|   | n (HCC)     |
|   |   Chronic   |
|   | kidney      |
|   | disease     |
|   |             |
|   | Congestive  |
|   | heart       |
|   | failure     |
|   | (HCC)       |
|   |   COPD      |
|   | (chronic    |
|   | obstructive |
|   |  pulmonary  |
|   | disease)    |
|   | (HCC)       |
|   |   COPD      |
|   | (chronic    |
|   | obstructive |
|   |  pulmonary  |
|   | disease)    |
|   | (HCC)       |
|   | 2018   |
|   |             |
|   |   Coronary  |
|   | artery      |
|   | disease     |
|   | stent       |
|   | placements: |
|   |  3 total    |
|   |             |
|   | Depression  |
|   |             |
|   |   Diabetes  |
|   | other       |
|   | abstracted  |
|   |             |
|   |   Diabetes  |
|   | mellitus,   |
|   | type 2      |
|   | (HCC)       |
|   |   ESRD on   |
|   | peritoneal  |
|   | dialysis    |
|   | (HCC)       |
|   |   GERD      |
 
|   | (gastroesop |
|   | hageal      |
|   | reflux      |
|   | disease)    |
|   | 1992        |
|   |             |
|   | Hemodialysi |
|   | s patient   |
|   | (HCC)       |
|   | 10/2016     |
|   | Stopped     |
|   | peritoneal  |
|   | and         |
|   | restarted   |
|   | hemodialysi |
|   | s           |
|   |             |
|   | Hemorrhage  |
|   | of          |
|   | gastrointes |
|   | tinal       |
|   | tract,      |
|   | unspecified |
|   |  2017    |
|   | low GI,     |
|   | rectal      |
|   | bleeding    |
|   |   High      |
|   | blood       |
|   | pressure    |
|   | other       |
|   | abstracted  |
|   |             |
|   |   High      |
|   | cholesterol |
|   |  other      |
|   | abstracted  |
|   |             |
|   |             |
|   | Hyperlipide |
|   | mark         |
|   |             |
|   | Hypertensio |
|   | n           |
|   |             |
|   | Hyponatremi |
|   | a 2017  |
|   |             |
|   |             |
|   | Myocardial  |
|   | infarction  |
|   | (HCC)       |
|   | 2017     |
|   |   Other     |
|   | chronic     |
|   | pain        |
|   | feet,       |
|   |   Pain of   |
|   | left hip    |
|   | joint       |
 
|   | 2016  |
|   |  due to hip |
|   |  fracture   |
|   | and         |
|   | replacement |
|   |             |
|   |   Partial   |
|   | small bowel |
|   |             |
|   | obstruction |
|   |  (HCC)      |
|   | 2017  |
|   |  resolved   |
|   |             |
|   |   Renal     |
|   | failure     |
|   | Stage 5.    |
|   | Fistula in  |
|   | the JAN -   |
|   | not on      |
|   | dialysis    |
|   | yet.        |
|   |             |
|   | Unspecified |
|   |  visual     |
|   | disturbance |
|   |    glasses  |
|   | Past        |
|   | Surgical    |
|   | History     |
|   | Procedure   |
|   | Laterality  |
|   | Date        |
|   |   AV        |
|   | FISTULA     |
|   | PLACEMENT   |
|   |   left  |
|   | upper arm   |
|   | AV fistula  |
|   | - failed    |
|   |   AV        |
|   | FISTULA     |
|   | REPAIR N/A  |
|   | 10/25/2016  |
|   |  Procedure: |
|   |  AV FISTULA |
|   |  - GRAFT    |
|   | REPAIR/REVI |
|   | PRANEETH;       |
|   | Surgeon:    |
|   | Kirt Y Arnoldo,  |
|   | MD;         |
|   | Location:   |
|   | [facility]   |
|   | OR;         |
|   | Service:    |
|   | Vascular;   |
|   | Laterality: |
|   |  N/A;       |
|   | Superficial |
 
|   | ization and |
|   |  revision   |
|   | of left arm |
|   |  fistula    |
|   |   CARDIAC   |
|   | CATHETERIZA |
|   | TION        |
|   | 2017  |
|   |             |
|   |   CARPAL    |
|   | TUNNEL      |
|   | RELEASE     |
|   | Bilateral   |
|   | other       |
|   | abstracted  |
|   |             |
|   |   CATARACT  |
|   | EXTRACTION  |
|   | Bilateral   |
|   |         |
|   |   CATHETER  |
|   | REMOVAL N/A |
|   |  2016 |
|   |             |
|   | Procedure:  |
|   | DIALYSIS    |
|   | CATHETER -  |
|   | REMOVAL;    |
|   | Surgeon:    |
|   | Kirt Mclaughlin,  |
|   | MD;         |
|   | Location:   |
|   | [facility]   |
|   | OR;         |
|   | Service:    |
|   | Vascular;   |
|   | Laterality: |
|   |  N/A;  PD   |
|   | cath        |
|   | removal     |
|   |             |
|   | CHOLECYSTEC |
|   | FELY  other |
|   |             |
|   | abstracted  |
|   |             |
|   |             |
|   | COLONOSCOPY |
|   |             |
|   |   CORONARY  |
|   | ARTERY      |
|   | BYPASS      |
|   | GRAFT       |
|   |   CORONARY  |
|   | STENT       |
|   | PLACEMENT   |
|   | 2017  |
|   |  Drug       |
|   | Elutin |
|   |  stents to  |
 
|   | OM, 1 stent |
|   |  to prox.   |
|   | LAD         |
|   |   EYE       |
|   | SURGERY     |
|   |             |
|   |   FOOT      |
|   | AMPUTATION  |
|   | Left        |
|   | 2018  |
|   |  Procedure: |
|   |  FOREFOOT - |
|   |             |
|   | AMPUTATION; |
|   |   Surgeon:  |
|   | Srinivasan L     |
|   | Abdi,    |
|   | DPM;        |
|   | Location:   |
|   | [facility]   |
|   | OR;         |
|   | Service:    |
|   | Podiatry;   |
|   | Laterality: |
|   |  Left;      |
|   |   HARDWARE  |
|   | PRESENT     |
|   | stent       |
|   | placements  |
|   | x 3, Left   |
|   | hip         |
|   | replacement |
|   | , vascular  |
|   | stents 2 in |
|   |  left leg   |
|   |             |
|   |   HIP       |
|   | ARTHROPLAST |
|   | Y Left      |
|   | 2016   |
|   | Procedure:  |
|   | HIP -       |
|   | ARTHROPLAST |
|   | Y(ALLISON);    |
|   | Surgeon:    |
|   | Uriel  |
|   | MD Crispin;  |
|   |  Location:  |
|   | [facility]   |
|   | OR;         |
|   | Service:    |
|   | Orthopedics |
|   | ;           |
|   | Laterality: |
|   |  Left;      |
|   |             |
|   | HYSTERECTOM |
|   | Y  1998     |
|   | complete    |
|   |   PACEMAKER |
 
|   |  INSERTION  |
|   |    [place]   |
|   | scientific  |
|   | pacemaker/d |
|   | efib        |
|   |             |
|   | PERITONEAL  |
|   | CATHETER    |
|   | INSERTION   |
|   | 8/15/2013   |
|   |             |
|   |             |
|   | PERITONEAL  |
|   | CATHETER    |
|   | INSERTION   |
|   | N/A         |
|   | 2016   |
|   | Procedure:  |
|   | LAPAROSCOPI |
|   | C -         |
|   | PERITONEAL  |
|   | DIALYSIS    |
|   | CATH        |
|   | INSERTION;  |
|   |  Surgeon:   |
|   | Kirt Mclaughlin,  |
|   | MD;         |
|   | Location:   |
|   | [facility]   |
|   | OR;         |
|   | Service:    |
|   | Vascular;   |
|   | Laterality: |
|   |  N/A;       |
|   | Removal of  |
|   | old         |
|   | catheter    |
|   |   SHOULDER  |
|   | SURGERY     |
|   | Right 1980  |
|   |  frozen     |
|   | shoulder    |
|   |   UNLISTED  |
|   | PROCEDURE   |
|   | ARTHROSCOPY |
|   |     total   |
|   | Left hip    |
|   |   vascular  |
|   | stents Left |
|   |  2017    |
|   | leg (2      |
|   | stents)     |
|   |   VASCULAR  |
|   | SURGERY     |
|   | Allergies   |
|   | Allergen    |
|   | Reactions   |
|   |             |
|   |   Quinapril |
|   |  Hcl        |
 
|   | Anaphylaxis |
|   |             |
|   |   Digoxin   |
|   | And Related |
|   |  Other (See |
|   |  Comments)  |
|   |   toxic     |
|   |             |
|   | Metaxalone  |
|   | Other (See  |
|   | Comments)   |
|   |             |
|   |   Morphine  |
|   | Mental      |
|   | Changes     |
|   | Make her    |
|   | crazy/      |
|   | "funny      |
|   | feeling in  |
|   | my head"Has |
|   |  used       |
|   | hydromorpho |
|   | ne in-house |
|   |             |
|   |             |
|   | Pantoprazol |
|   | e Sodium    |
|   | Nausea and  |
|   | Vomiting    |
|   |             |
|   | Penicillins |
|   |  Rash       |
|   |   Quinapril |
|   |  Hcl Edema  |
|   |   Facial    |
|   | Edema       |
|   |   Sudafed   |
|   | [Pseudoephe |
|   | drine Hcl]  |
|   | Rash No     |
|   | prescriptio |
|   | ns prior to |
|   |  admission. |
|   |  DISCHARGE  |
|   | EXAMVital   |
|   | Signs:BP    |
|   | 108/56 (BP  |
|   | Location:   |
|   | Right upper |
|   |  arm)  |    |
|   | Pulse 68  | |
|   |  Temp 97.7  |
|   |   F (36.5   |
|   |   C)        |
|   | (Axillary)  |
|   |  | Resp 20  |
|   |  | Ht 1.778 |
|   |  m (5' 10") |
|   |   | Wt 65.5 |
|   |  kg (144 lb |
 
|   |  6.4 oz)  | |
|   |  SpO2 92%   |
|   | | BMI 20.72 |
|   |  kg/m       |
|   | General     |
|   | appearance: |
|   |  alert,     |
|   | appears     |
|   | stated age, |
|   |             |
|   | cooperative |
|   | , fatigued  |
|   | and no      |
|   | distressHea |
|   | d:          |
|   | Normocephal |
|   | ic, without |
|   |  obvious    |
|   | abnormality |
|   | ,           |
|   | atraumaticN |
|   | chloe: no     |
|   | adenopathy, |
|   |  no carotid |
|   |  bruit, no  |
|   | JVD,        |
|   | supple,     |
|   | symmetrical |
|   | , trachea   |
|   | midline and |
|   |  thyroid    |
|   | not         |
|   | enlarged,   |
|   | symmetric,  |
|   | no          |
|   | tenderness/ |
|   | mass/nodule |
|   | sLungs:     |
|   | clear to    |
|   | auscultatio |
|   | n           |
|   | bilaterally |
|   | Heart:      |
|   | regular     |
|   | rate and    |
|   | rhythm, S1, |
|   |  S2 normal, |
|   |  no murmur, |
|   |  click, rub |
|   |  or         |
|   | gallopAbdom |
|   | en: soft,   |
|   | non-tender; |
|   |  bowel      |
|   | sounds      |
|   | normal; no  |
|   | masses,  no |
|   |             |
|   | organomegal |
|   | yExtremitie |
 
|   | s: left leg |
|   |  boot       |
|   | Pulses: 2+  |
|   | and         |
|   | symmetricNe |
|   | urologic:   |
|   | Grossly     |
|   | normal AXO3 |
|   |  non focal  |
|   | DATACBC:    |
|   | Lab Results |
|   |  Component  |
|   | Value Date  |
|   |  WBC 4.08   |
|   | 2019  |
|   |  RBC 2.83   |
|   | (L)         |
|   | 2019  |
|   |  HGB 9.7    |
|   | (L)         |
|   | 2019  |
|   |  HCT 29.7   |
|   | (L)         |
|   | 2019  |
|   |  .0  |
|   | (H)         |
|   | 2019  |
|   |  MCH 34.2   |
|   | (H)         |
|   | 2019  |
|   |  MCHC 32.5  |
|   | 2019  |
|   |  RDW 64.3   |
|   | (H)         |
|   | 2019  |
|   |      |
|   | (L)         |
|   | 2019  |
|   |  MPV 9.5    |
|   | 2019  |
|   |  DIFFTYPE   |
|   | MANUAL      |
|   | 2019  |
|   | CMP:  Lab   |
|   | Results     |
|   | Component   |
|   | Value Date  |
|   |       |
|   | 2019  |
|   |  K 4.4      |
|   | 2019  |
|   |       |
|   | 2019  |
|   |  CO2 28     |
|   | 2019  |
|   |  ANIONGAP   |
|   | 14          |
|   | 2019  |
|   |  GLUF 153   |
|   | (H)         |
 
|   | 2019  |
|   |  BUN 15     |
|   | 2019  |
|   |  CREATININE |
|   |  4.9 (H)    |
|   | 2019  |
|   |  BCR 3      |
|   | 2019  |
|   |  CA 9.6     |
|   | 2019  |
|   |  CA 8.7     |
|   | 2016  |
|   |  PROT 6.1   |
|   | (L)         |
|   | 2019  |
|   |  ALB 2.6    |
|   | (L)         |
|   | 2019  |
|   |  GLOB 3.5   |
|   | 2019  |
|   |  BILITOT    |
|   | 0.5         |
|   | 2019  |
|   |  ALP 85     |
|   | 2019  |
|   |  AST 22     |
|   | 2019  |
|   |  ALT 22     |
|   | 2019  |
|   |  EGFR 9 (L) |
|   |  2019 |
|   |  Lab        |
|   | Results     |
|   | Component   |
|   | Value Date  |
|   |  CKTOTAL 22 |
|   |  (L)        |
|   | 2019  |
|   |  CKMB 1.8   |
|   | 2019  |
|   |  CKMBINDEX  |
|   | 8.2         |
|   | 2019  |
|   |  TROPONINI  |
|   | 0.061       |
|   | 2019  |
|   | Disposition |
|   | :           |
|   | HomeConditi |
|   | on:         |
|   | StableCode  |
|   | Status:     |
|   | PriorDischa |
|   | rge         |
|   | Instruction |
|   | sDiet Renal |
|   |  Diet Low   |
|   | Sodium      |
|   | Activity as |
|   |  Advised by |
 
|   |  Physical   |
|   | Therapy     |
|   | Call MD     |
|   | for:        |
|   | Temperature |
|   |  > 100.4  F |
|   |  (38  C)    |
|   | Call MD     |
|   | for:        |
|   | Persistant  |
|   | Nausea and  |
|   | Vomiting    |
|   | Call MD     |
|   | for:        |
|   | Severe      |
|   | Uncontrolle |
|   | d Pain Call |
|   |  MD for:    |
|   | Redness,    |
|   | Tenderness, |
|   |  or Signs   |
|   | of          |
|   | Infection   |
|   | (Pain,      |
|   | Swelling,   |
|   | Redness,    |
|   | Odor or     |
|   | Green/Yello |
|   | w Discharge |
|   |  Around     |
|   | Incision    |
|   | Site) Call  |
|   | MD for:     |
|   | Hives Call  |
|   | MD for:     |
|   | Difficulty  |
|   | Breathing,  |
|   | Headache or |
|   |  Visual     |
|   | Disturbance |
|   | s Call MD   |
|   | for:        |
|   | Persistant  |
|   | Dizziness   |
|   | or          |
|   | Light-Heade |
|   | dness Call  |
|   | MD for:     |
|   | Extreme     |
|   | Fatigue     |
|   | Follow      |
|   | up:I[facility]   |
|   | Couch,      |
|   |  [address]     |
|   | [address],   |
|   | [place]         |
|   | [address] |
|   | [place]        |
|   | [id number] |
|   | [phone] |
 
|   | ule an      |
|   | appointment |
|   |  as soon as |
|   |  possible   |
|   | for a visit |
|   |  in 1       |
|   | weekJimmy   |
|   | [place],       |
|   | [id number]    |
|   | [address]  |
|   | [address] |
|   | [place]       |
|   | [id number] |
|   | [phone] |
|   | ule an      |
|   | appointment |
|   |  as soon as |
|   |  possible   |
|   | for a visit |
|   |  in 1       |
|   | weekBrian L |
|   |  [place],   |
|   | [id number]      |
|   | [address], |
|   |  [place]        |
|   | [address] |
|   |  [place]         |
|   | [id number] |
|   | [phone] |
|   | ule an      |
|   | appointment |
|   |  as soon as |
|   |  possible   |
|   | for a visit |
|   |  in 2       |
|   | weeksMershe |
|   | d [place], |
|   |  [id number]     |
|   | [address] |
|   |  [place]        |
|   | [place]   |
|   | [place]          |
|   | [id number] |
|   | [phone]    |
|   | needed      |
|   | Medication  |
|   | List        |
|   | CHANGE how  |
|   | you take    |
|   | these       |
|   | medications |
|   |             |
|   | carvedilol  |
|   | 12.5 MG     |
|   | tabletQTY:  |
|   |  60         |
|   | tabletRefil |
|   | ls:         |
|   | 0Doctor's   |
|   | comments:   |
 
|   | Hold for    |
|   | SBP less    |
|   | than        |
|   | 100Hold for |
|   |  HR less    |
|   | than        |
|   | 60Commonly  |
|   | known as:   |
|   | COREGTake 1 |
|   |  tablet by  |
|   | mouth 2     |
|   | (two) times |
|   |  daily with |
|   |  meals.What |
|   |  changed:   |
|   |  medication |
|   |  strength   |
|   |  how much   |
|   | to take     |
|   | LORazepam 1 |
|   |  MG         |
|   | tabletQTY:  |
|   |  30         |
|   | tabletRefil |
|   | ls:         |
|   | 0Commonly   |
|   | known as:   |
|   | ATIVANTake  |
|   | 1 tablet by |
|   |  mouth      |
|   | every 8     |
|   | (eight)     |
|   | hours as    |
|   | needed for  |
|   | Anxiety.Wha |
|   | t changed:  |
|   |  when to    |
|   | take this   |
|   | losartan 25 |
|   |  MG         |
|   | tabletQTY:  |
|   |  30         |
|   | tabletRefil |
|   | ls:         |
|   | 0Commonly   |
|   | known as:   |
|   | COZAARTake  |
|   | 1 tablet by |
|   |  mouth      |
|   | daily.What  |
|   | changed:    |
|   | how much to |
|   |  take       |
|   | CONTINUE    |
|   | taking      |
|   | these       |
|   | medications |
|   |   *         |
|   | albuterol   |
|   | 108 (90     |
 
|   | Base)       |
|   | MCG/ACT     |
|   | inhalerRefi |
|   | lls:        |
|   | 0Commonly   |
|   | known as:   |
|   | PROVENTIL   |
|   | HFA;VENTOLI |
|   | N HFA *     |
|   | VENTOLIN    |
|   |  (90 |
|   |  Base)      |
|   | MCG/ACT     |
|   | inhalerRefi |
|   | lls:        |
|   | 0Generic    |
|   | drug:       |
|   | albuterol   |
|   | apixaban    |
|   | 2.5 MG      |
|   | tabletQTY:  |
|   |  60         |
|   | tabletRefil |
|   | ls:         |
|   | 0Commonly   |
|   | known as:   |
|   | ELIQUISTake |
|   |  1 tablet   |
|   | by mouth 2  |
|   | (two) times |
|   |  daily.     |
|   | aspirin 81  |
|   | MG EC       |
|   | tabletQTY:  |
|   |  30         |
|   | tabletRefil |
|   | ls:  0Take  |
|   | 1 tablet by |
|   |  mouth      |
|   | daily with  |
|   | breakfast.  |
|   | atorvastati |
|   | n 40 MG     |
|   | tabletQTY:  |
|   |  30         |
|   | tabletRefil |
|   | ls:         |
|   | 0Commonly   |
|   | known as:   |
|   | LIPITORTake |
|   |  1 tablet   |
|   | by mouth    |
|   | nightly.    |
|   | calcium     |
|   | acetate 667 |
|   |  MG         |
|   | capsuleRefi |
|   | lls:        |
|   | 0Commonly   |
|   | known as:   |
 
|   | PHOSLO      |
|   | clopidogrel |
|   |  75 MG      |
|   | tabletQTY:  |
|   |  30         |
|   | tabletRefil |
|   | ls:         |
|   | 11Commonly  |
|   | known as:   |
|   | PLAVIXTake  |
|   | 1 tablet by |
|   |  mouth      |
|   | daily.      |
|   | esomeprazol |
|   | e 20 MG     |
|   | capsuleRefi |
|   | lls:        |
|   | 0Commonly   |
|   | known as:   |
|   | NEXIUM      |
|   | HYDROcodone |
|   | -acetaminop |
|   | hen 5-325   |
|   | MG per      |
|   | tabletQTY:  |
|   |  30         |
|   | tabletRefil |
|   | ls:         |
|   | 0Commonly   |
|   | known as:   |
|   | NORCOTake 1 |
|   |  tablet by  |
|   | mouth every |
|   |  4 (four)   |
|   | hours as    |
|   | needed.     |
|   | insulin     |
|   | aspart 100  |
|   | UNIT/ML     |
|   | injectionRe |
|   | fills:      |
|   | 0Commonly   |
|   | known as:   |
|   | NOVOLOG     |
|   | insulin     |
|   | degludec    |
|   | 100 UNIT/ML |
|   |             |
|   | injectionRe |
|   | fills:      |
|   | 0Commonly   |
|   | known as:   |
|   | TRESIBA     |
|   | isosorbide  |
|   | mononitrate |
|   |  60 MG 24   |
|   | hr          |
|   | tabletQTY:  |
|   |  30         |
|   | tabletRefil |
 
|   | ls:         |
|   | 1Commonly   |
|   | known as:   |
|   | IMDURTake 1 |
|   |  tablet by  |
|   | mouth       |
|   | daily.      |
|   | loperamide  |
|   | 2 MG        |
|   | capsuleRefi |
|   | lls:        |
|   | 0Commonly   |
|   | known as:   |
|   | IMODIUM     |
|   | nitroGLYCER |
|   | IN 0.4 MG   |
|   | SL          |
|   | tabletQTY:  |
|   |  30         |
|   | tabletRefil |
|   | ls:         |
|   | 0Doctor's   |
|   | comments:   |
|   | Hold for    |
|   | SBP less    |
|   | than        |
|   | 100Commonly |
|   |  known as:  |
|   |             |
|   | NITROSTATPl |
|   | ace 1       |
|   | tablet      |
|   | under the   |
|   | tongue      |
|   | every 5     |
|   | (five)      |
|   | minutes as  |
|   | needed for  |
|   | Chest pain. |
|   |             |
|   | nortriptyli |
|   | ne 25 MG    |
|   | capsuleRefi |
|   | lls:        |
|   | 0Commonly   |
|   | known as:   |
|   | PAMELOR     |
|   | ondansetron |
|   |  4 MG       |
|   | disintegrat |
|   | ing         |
|   | tabletRefil |
|   | ls:         |
|   | 0Commonly   |
|   | known as:   |
|   | ZOFRAN-ODT  |
|   | oxybutynin  |
|   | 5 MG        |
|   | tabletRefil |
|   | ls:         |
 
|   | 0Commonly   |
|   | known as:   |
|   | DITROPAN    |
|   | prochlorper |
|   | azine 5 MG  |
|   | tabletRefil |
|   | ls:  0      |
|   | ranitidine  |
|   | 150 MG      |
|   | tabletRefil |
|   | ls:         |
|   | 0Commonly   |
|   | known as:   |
|   | ZANTAC      |
|   | rOPINIRole  |
|   | 0.25 MG     |
|   | tabletRefil |
|   | ls:         |
|   | 0Commonly   |
|   | known as:   |
|   | REQUIP      |
|   | SLOW-MAG    |
|   | 71.5-119 MG |
|   |             |
|   | TbecRefills |
|   | :  0Generic |
|   |  drug:      |
|   | Magnesium   |
|   | Cl-Calcium  |
|   | Carbonate   |
|   | Vitamin D3  |
|   | 5000 units  |
|   | CapsRefills |
|   | :  0        |
|   | Vortioxetin |
|   | e HBr 10 MG |
|   |             |
|   | TabsRefills |
|   | :  0  *     |
|   | This list   |
|   | has 2       |
|   | medication( |
|   | s) that are |
|   |  the same   |
|   | as other    |
|   | medications |
|   |  prescribed |
|   |  for you.   |
|   | Read the    |
|   | directions  |
|   | carefully,  |
|   | and ask     |
|   | your doctor |
|   |  or other   |
|   | care        |
|   | provider to |
|   |  review     |
|   | them with   |
|   | you.    You |
|   |  might also |
 
|   |  be taking  |
|   | other       |
|   | medications |
|   |  not listed |
|   |  above. If  |
|   | you have    |
|   | questions   |
|   | about any   |
|   | of your     |
|   | other       |
|   | medications |
|   | , talk to   |
|   | the person  |
|   | who         |
|   | prescribed  |
|   | them or     |
|   | your        |
|   | Primary     |
|   | Care        |
|   | Provider.   |
|   |   STOP      |
|   | taking      |
|   | these       |
|   | medications |
|   |             |
|   | CefTAZidime |
|   |  and        |
|   | Dextrose    |
|   | 2-5         |
|   | GM-%(50ML)  |
|   | Solr        |
|   | furosemide  |
|   | 20 MG       |
|   | tabletCommo |
|   | nly known   |
|   | as:  LASIX  |
|   | vancomycin  |
|   | 1000 mg     |
|   | injectionCo |
|   | mmonly      |
|   | known as:   |
|   | VANCOCIN    |
|   | Where to    |
|   | Get Your    |
|   | Medications |
|   |   You can   |
|   | get these   |
|   | medications |
|   |  from any   |
|   | pharmacy    |
|   | Bring a     |
|   | paper       |
|   | prescriptio |
|   | n for each  |
|   | of these    |
|   | medications |
|   |             |
|   | carvedilol  |
|   | 12.5 MG     |
|   | tablet      |
 
|   | LORazepam 1 |
|   |  MG         |
|   | tablet      |
|   | nitroGLYCER |
|   | IN 0.4 MG   |
|   | SL tablet   |
|   | Discharge   |
|   | took over   |
|   | 30          |
|   | minutes, to |
|   |  include    |
|   | final       |
|   | examination |
|   | ,           |
|   | discussion  |
|   | of          |
|   | admission,  |
|   | and         |
|   | preparation |
|   |  of         |
|   | prescriptio |
|   | ns,         |
|   | instruction |
|   | s for       |
|   | on-going    |
|   | care,       |
|   | follow-up   |
|   | and         |
|   | documentati |
|   | on of       |
|   | discharge   |
|   | summary.Christian |
|   | alexandra Ferrara,  |
|   | MD2019 |
+---+-------------+
 
 
 
+--------+-------------+---+----------------------+----------------------+
| 01/15/ | Documentati |   |   Rl,         | Other (Diagnosis     |
|    | on Only     |   | CHELSEY Hays         | request sent to      |
|        |             |   |                      | [facility] and           |
|        |             |   |                      | confirmation)        |
+--------+-------------+---+----------------------+----------------------+
| / | Documentati |   |   João Asher MD  | Other (December      |
2019   | on Only     |   |                      | [facility] |
|        |             |   |                      |  Dialysis Rounding   |
|        |             |   |                      | Note)                |
+--------+-------------+---+----------------------+----------------------+
| 01/10/ | Office      |   |   Srinivasan Abdi,  | S/P amputation of    |
2019   | Visit       |   | DPM                  | foot, left (HCC)     |
|        |             |   |                      | (Primary Dx); Type 2 |
|        |             |   |                      |  diabetes mellitus   |
|        |             |   |                      | with chronic kidney  |
|        |             |   |                      | disease on chronic   |
|        |             |   |                      | dialysis, with       |
|        |             |   |                      | long-term current    |
|        |             |   |                      | use of insulin (HCC) |
+--------+-------------+---+----------------------+----------------------+
| / | Telephone   |   |   Srinivasan Abdi,  | Follow-up (schedule  |
 
2019   |             |   | DPM                  | appointment)         |
+--------+-------------+---+----------------------+----------------------+
| / | Telephone   |   |   Srinivasan Abdi,  | Other                |
|    |             |   | DPM                  | (caregiver/Rehab)    |
+--------+-------------+---+----------------------+----------------------+
| / | Hospital    |   |   Srinivasan Abdi,  | Acute osteomyelitis  |
| 2018 - | Encounter   |   | DPM  Moiz Josue | of left ankle or     |
|        |             |   |  MD Melisa Porter,    | foot (formerly [facility]);          |
| / |             |   | Winston, DO            | Prophylactic         |
| 2019   |             |   |                      | antibiotic; Anemia   |
|        |             |   |                      | in ESRD (end-stage   |
|        |             |   |                      | renal disease) (formerly [facility]) |
+--------+-------------+---+----------------------+----------------------+
 
 
 
+---+-------------+
|   |   Discharge |
|   |  Summaries  |
|   | - Melisa,     |
|   | DO Winston - |
|   |  2019 |
|   |   1:12 PM   |
|   | PST         |
|   | Formatting  |
|   | of this     |
|   | note may be |
|   |  different  |
|   | from the    |
|   | original.[facility] |
|   | [facility]        |
|   [facility]
|   [facility]     [facility]
|   [facility] |
|   | ce:         |
|   | Hospitalist |
|   | Physician   |
|   | Discharge   |
|   | Summary     |
|   | Patient     |
|   | ID:Cherie    |
|   | Apple     |
|   | CardenMRN:  |
|   | [id number]/ |
|   | [id number]    |
|   | y.o.Admit   |
|   | date:       |
|   | 2018D |
|   | ischarge    |
|   | date:       |
|   | 2019Adm |
|   | itting      |
|   | Physician:  |
|   | Srinivasan APONTE     |
|   | Julian,    |
|   | DPM         |
|   | Discharge   |
|   | Physician:  |
|   | Winston Vincent |
|   |             |
 
|   | DOConsultan |
|   | ts:         |
|   | Treatment   |
|   | Team:       |
|   | Consulting  |
|   | Physician:  |
|   | João H      |
|   | Akoum,      |
|   | MDConsultin |
|   | g           |
|   | Physician:  |
|   | Porntip     |
|   | Kiatsimkul, |
|   |             |
|   | MDConsultin |
|   | g           |
|   | Physician:  |
|   | Oumar      |
|   | Kisha,      |
|   | DPMConsulti |
|   | ng          |
|   | Physician:  |
|   | Margarita     |
|   | Luz,       |
|   | DOAdmitting |
|   |  Provider:  |
|   | Srinivasan L     |
|   | Abdi,    |
|   | DPMPrimary  |
|   | Discharge   |
|   | Diagnoses:  |
|   | Principal   |
|   | Problem:    |
|   | Osteomyelit |
|   | is of left  |
|   | foot        |
|   | (HCC)Active |
|   |  Problems:  |
|   |  Secondary  |
|   | hyperparath |
|   | yroidism    |
|   | (HCC)       |
|   | Depression  |
|   |  ESRD (end  |
|   | stage renal |
|   |  disease)   |
|   | (HCC)  Type |
|   |  2 diabetes |
|   |  mellitus   |
|   | with        |
|   | chronic     |
|   | kidney      |
|   | disease on  |
|   | chronic     |
|   | dialysis,   |
|   | with        |
|   | long-term   |
|   | current use |
|   |  of insulin |
|   |  (HCC)      |
 
|   | Anemia in   |
|   | ESRD        |
|   | (end-stage  |
|   | renal       |
|   | disease)    |
|   | (HCC)       |
|   | Hypertensio |
|   | n,          |
|   | essential   |
|   | Diabetic    |
|   | foot ulcer  |
|   | (HCC)       |
|   | Chronic     |
|   | systolic    |
|   | congestive  |
|   | heart       |
|   | failure     |
|   | (HCC)       |
|   | Chronic     |
|   | anticoagula |
|   | tion        |
|   | Cardiomyopa |
|   | thy (HCC)   |
|   | Paroxysmal  |
|   | atrial      |
|   | fibrillatio |
|   | n (HCC)     |
|   | S/P CABG x  |
|   | 2  S/P      |
|   | mitral      |
|   | valve       |
|   | repair  PVD |
|   |             |
|   | (peripheral |
|   |  vascular   |
|   | disease)    |
|   | (HCC)  CAD  |
|   | (coronary   |
|   | artery      |
|   | disease)Res |
|   | olved       |
|   | Problems:   |
|   | * No        |
|   | resolved    |
|   | hospital    |
|   | problems.   |
|   | *HPI (from  |
|   | h/p on      |
|   | /The   |
|   | patient is  |
|   | a 68 y.o.   |
|   | female with |
|   |             |
|   | significant |
|   |  past       |
|   | medical     |
|   | history of  |
|   |  ESRD on HD |
|   |  every TTS, |
|   |  type 2 DM, |
 
|   |  HTN, PAD   |
|   | s/p         |
|   | angioplasty |
|   |  LLE, CAD   |
|   | s/pMI,      |
|   | CABG, s/p   |
|   | AVR in      |
|   | 2018,     |
|   | HFrEF with  |
|   | last EF of  |
|   | 20-25%, s/p |
|   |  AICD       |
|   | placement,  |
|   | AF on       |
|   | chronic     |
|   | anticoagula |
|   | tion,  was  |
|   | admitted    |
|   | for         |
|   | transmetata |
|   | rsal        |
|   | amputation  |
|   | for         |
|   | osteomyelit |
|   | is of the   |
|   | left 1st    |
|   | MT.  She    |
|   | has had     |
|   | chronic     |
|   | osteomyelit |
|   | is of the   |
|   | first       |
|   | metatarsal  |
|   | and needing |
|   |             |
|   | amputation. |
|   |  It was     |
|   | postponed   |
|   | until       |
|   | angioplasty |
|   |  of her LLE |
|   |  was done   |
|   | to optimize |
|   |  would      |
|   | healing.    |
|   | She         |
|   | underwent   |
|   | amputation  |
|   | today. EBL  |
|   | was <       |
|   | 200ml.  Per |
|   |  Dr.        |
|   | Abdi     |
|   | there was   |
|   | concern for |
|   |  residual   |
|   | bone        |
|   | infection.  |
|   | He          |
|   | consulted   |
 
|   | ID who      |
|   | suggested   |
|   | cefepime    |
|   | 2gm post    |
|   | HD. Patient |
|   |  was seen   |
|   | today at    |
|   | bedside,    |
|   | denies      |
|   | having      |
|   | chest pain, |
|   |  SOB,       |
|   | lighhteaded |
|   | ness,       |
|   | tolerating  |
|   | clear       |
|   | liquid      |
|   | diet.       |
|   | Hospital    |
|   | Course:     |
|   | Patient was |
|   |  admitted   |
|   | to the      |
|   | hosptialsis |
|   | t service   |
|   | for further |
|   |  management |
|   |  of         |
|   | patient's   |
|   | non-healing |
|   |  foot       |
|   | ulcer.      |
|   | Antibiotics |
|   |  were       |
|   | managed by  |
|   | ID,         |
|   | Podiatry    |
|   | was         |
|   | consulted   |
|   | and         |
|   | evaluated   |
|   | patient and |
|   |  took       |
|   | patient for |
|   |  left foot  |
|   | transmetata |
|   | rsal        |
|   | amputation. |
|   |  Patient    |
|   | tolerated   |
|   | procedure   |
|   | well.       |
|   | Nephrology  |
|   | was         |
|   | consulted   |
|   | and managed |
|   |  patient's  |
|   | ESRD/dialys |
|   | is. During  |
|   | the         |
 
|   | patient's   |
|   | stay she    |
|   | developed   |
|   | chest pain  |
|   | that was    |
|   | resolved by |
|   |             |
|   | nitroglycer |
|   | in.         |
|   | Cardiology  |
|   | was         |
|   | consulted,  |
|   | however     |
|   | there was   |
|   | not any     |
|   | interventio |
|   | ns besides  |
|   | medical     |
|   | management  |
|   | that the    |
|   | patient was |
|   |  a          |
|   | candidate   |
|   | for. Upon   |
|   | record      |
|   | review she  |
|   | had a       |
|   | stress test |
|   |  the month  |
|   | prior that  |
|   | was found   |
|   | to be       |
|   | negative.   |
|   | Patient     |
|   | remained    |
|   | stable and  |
|   | was         |
|   | subsequentl |
|   | y           |
|   | discharged  |
|   | to SNF for  |
|   | rehab and   |
|   | continued   |
|   | IV          |
|   | antibiotics |
|   | . She was   |
|   | given       |
|   | instruction |
|   | s to follow |
|   |  up with    |
|   | nephrology, |
|   |  ID,        |
|   | cardiology, |
|   |  Primary    |
|   | care, and   |
|   | podiatry.   |
|   | Past        |
|   | Medical     |
|   | History:    |
|   | Past        |
 
|   | Medical     |
|   | History     |
|   | Diagnosis   |
|   | Date        |
|   |   Acute MI  |
|   | (HCC) 2003  |
|   |  with       |
|   | stenting x  |
|   | 1           |
|   |   Anemia    |
|   | associated  |
|   | with        |
|   | chronic     |
|   | renal       |
|   | failure     |
|   | 2016   |
|   |             |
|   |   Atrial    |
|   | fibrillatio |
|   | n (HCC)     |
|   |   Chronic   |
|   | kidney      |
|   | disease     |
|   |             |
|   | Congestive  |
|   | heart       |
|   | failure     |
|   | (HCC)       |
|   |   COPD      |
|   | (chronic    |
|   | obstructive |
|   |  pulmonary  |
|   | disease)    |
|   | (HCC)       |
|   |   COPD      |
|   | (chronic    |
|   | obstructive |
|   |  pulmonary  |
|   | disease)    |
|   | (HCC)       |
|   | 2018   |
|   |             |
|   |   Coronary  |
|   | artery      |
|   | disease     |
|   | stent       |
|   | placements: |
|   |  3 total    |
|   |             |
|   | Depression  |
|   |             |
|   |   Diabetes  |
|   | other       |
|   | abstracted  |
|   |             |
|   |   Diabetes  |
|   | mellitus,   |
|   | type 2      |
|   | (HCC)       |
|   |   ESRD on   |
 
|   | peritoneal  |
|   | dialysis    |
|   | (HCC)       |
|   |   GERD      |
|   | (gastroesop |
|   | hageal      |
|   | reflux      |
|   | disease)    |
|   | 1992        |
|   |             |
|   | Hemodialysi |
|   | s patient   |
|   | (HCC)       |
|   | 10/2016     |
|   | Stopped     |
|   | peritoneal  |
|   | and         |
|   | restarted   |
|   | hemodialysi |
|   | s           |
|   |             |
|   | Hemorrhage  |
|   | of          |
|   | gastrointes |
|   | tinal       |
|   | tract,      |
|   | unspecified |
|   |  2017    |
|   | low GI,     |
|   | rectal      |
|   | bleeding    |
|   |   High      |
|   | blood       |
|   | pressure    |
|   | other       |
|   | abstracted  |
|   |             |
|   |   High      |
|   | cholesterol |
|   |  other      |
|   | abstracted  |
|   |             |
|   |             |
|   | Hyperlipide |
|   | mark         |
|   |             |
|   | Hypertensio |
|   | n           |
|   |             |
|   | Hyponatremi |
|   | a 2017  |
|   |             |
|   |             |
|   | Myocardial  |
|   | infarction  |
|   | (HCC)       |
|   | 2017     |
|   |   Other     |
|   | chronic     |
|   | pain        |
 
|   | feet,       |
|   |   Pain of   |
|   | left hip    |
|   | joint       |
|   | 2016  |
|   |  due to hip |
|   |  fracture   |
|   | and         |
|   | replacement |
|   |             |
|   |   Partial   |
|   | small bowel |
|   |             |
|   | obstruction |
|   |  (HCC)      |
|   | 2017  |
|   |  resolved   |
|   |             |
|   |   Renal     |
|   | failure     |
|   | Stage 5.    |
|   | Fistula in  |
|   | the JAN -   |
|   | not on      |
|   | dialysis    |
|   | yet.        |
|   |             |
|   | Unspecified |
|   |  visual     |
|   | disturbance |
|   |    glasses  |
|   | Past        |
|   | Surgical    |
|   | History     |
|   | Procedure   |
|   | Laterality  |
|   | Date        |
|   |   AV        |
|   | FISTULA     |
|   | PLACEMENT   |
|   |   left  |
|   | upper arm   |
|   | AV fistula  |
|   | - failed    |
|   |   AV        |
|   | FISTULA     |
|   | REPAIR N/A  |
|   | 10/25/2016  |
|   |  Procedure: |
|   |  AV FISTULA |
|   |  - GRAFT    |
|   | REPAIR/REVI |
|   | PRANEETH;       |
|   | Surgeon:    |
|   | Kirt Mclaughlin,  |
|   | MD;         |
|   | Location:   |
|   | [facility]   |
|   | OR;         |
|   | Service:    |
 
|   | Vascular;   |
|   | Laterality: |
|   |  N/A;       |
|   | Superficial |
|   | ization and |
|   |  revision   |
|   | of left arm |
|   |  fistula    |
|   |   CARDIAC   |
|   | CATHETERIZA |
|   | TION        |
|   | 2017  |
|   |             |
|   |   CARPAL    |
|   | TUNNEL      |
|   | RELEASE     |
|   | Bilateral   |
|   | other       |
|   | abstracted  |
|   |             |
|   |   CATARACT  |
|   | EXTRACTION  |
|   | Bilateral   |
|   |         |
|   |   CATHETER  |
|   | REMOVAL N/A |
|   |  2016 |
|   |             |
|   | Procedure:  |
|   | DIALYSIS    |
|   | CATHETER -  |
|   | REMOVAL;    |
|   | Surgeon:    |
|   | Kirt Y Arnoldo,  |
|   | MD;         |
|   | Location:   |
|   | [facility]   |
|   | OR;         |
|   | Service:    |
|   | Vascular;   |
|   | Laterality: |
|   |  N/A;  PD   |
|   | cath        |
|   | removal     |
|   |             |
|   | CHOLECYSTEC |
|   | FELY  other |
|   |             |
|   | abstracted  |
|   |             |
|   |             |
|   | COLONOSCOPY |
|   |             |
|   |   CORONARY  |
|   | ARTERY      |
|   | BYPASS      |
|   | GRAFT       |
|   |   CORONARY  |
|   | STENT       |
|   | PLACEMENT   |
 
|   | 2017  |
|   |  Drug       |
|   | Elutin |
|   |  stents to  |
|   | OM, 1 stent |
|   |  to prox.   |
|   | LAD         |
|   |   EYE       |
|   | SURGERY     |
|   |             |
|   |   FOOT      |
|   | AMPUTATION  |
|   | Left        |
|   | 2018  |
|   |  Procedure: |
|   |  FOREFOOT - |
|   |             |
|   | AMPUTATION; |
|   |   Surgeon:  |
|   | Srinivasan L     |
|   | Abdi,    |
|   | DPM;        |
|   | Location:   |
|   | [facility]   |
|   | OR;         |
|   | Service:    |
|   | Podiatry;   |
|   | Laterality: |
|   |  Left;      |
|   |   HARDWARE  |
|   | PRESENT     |
|   | stent       |
|   | placements  |
|   | x 3, Left   |
|   | hip         |
|   | replacement |
|   | , vascular  |
|   | stents 2 in |
|   |  left leg   |
|   |             |
|   |   HIP       |
|   | ARTHROPLAST |
|   | Y Left      |
|   | 2016   |
|   | Procedure:  |
|   | HIP -       |
|   | ARTHROPLAST |
|   | Y(ALLISON);    |
|   | Surgeon:    |
|   | Uriel  |
|   | MD Crispin;  |
|   |  Location:  |
|   | [facility]   |
|   | OR;         |
|   | Service:    |
|   | Orthopedics |
|   | ;           |
|   | Laterality: |
|   |  Left;      |
|   |             |
 
|   | HYSTERECTOM |
|   | Y  1998     |
|   | complete    |
|   |   PACEMAKER |
|   |  INSERTION  |
|   |    [place]   |
|   | scientific  |
|   | pacemaker/d |
|   | efib        |
|   |             |
|   | PERITONEAL  |
|   | CATHETER    |
|   | INSERTION   |
|   | 8/15/2013   |
|   |             |
|   |             |
|   | PERITONEAL  |
|   | CATHETER    |
|   | INSERTION   |
|   | N/A         |
|   | 2016   |
|   | Procedure:  |
|   | LAPAROSCOPI |
|   | C -         |
|   | PERITONEAL  |
|   | DIALYSIS    |
|   | CATH        |
|   | INSERTION;  |
|   |  Surgeon:   |
|   | Kirt Y Arnoldo,  |
|   | MD;         |
|   | Location:   |
|   | [facility]   |
|   | OR;         |
|   | Service:    |
|   | Vascular;   |
|   | Laterality: |
|   |  N/A;       |
|   | Removal of  |
|   | old         |
|   | catheter    |
|   |   SHOULDER  |
|   | SURGERY     |
|   | Right 1980  |
|   |  frozen     |
|   | shoulder    |
|   |   UNLISTED  |
|   | PROCEDURE   |
|   | ARTHROSCOPY |
|   |     total   |
|   | Left hip    |
|   |   vascular  |
|   | stents Left |
|   |  2017    |
|   | leg (2      |
|   | stents)     |
|   |   VASCULAR  |
|   | SURGERY     |
|   | Discharged  |
|   | Condition:  |
 
|   | Stable for  |
|   | discharge   |
|   | as stated   |
|   | above.Signi |
|   | ficant      |
|   | Diagnostic  |
|   | Studies:Ct  |
|   | Head        |
|   | Without     |
|   | ContrastRes |
|   | ult Date:   |
|   | 2018L |
|   | MADELINE        |
|   | APPLE     |
|   | WILFREDO      |
|   | 1950 68 |
|   |  years      |
|   | Female CT   |
|   | HEAD WO     |
|   | CONTRAST    |
|   | 2018  |
|   | 5:23 PM     |
|   | INDICATION: |
|   |  Altered    |
|   | mental      |
|   | status in a |
|   |  patient    |
|   | with acute  |
|   | osteomyelit |
|   | is          |
|   | COMPARISON: |
|   |  Head CT,   |
|   | 2017   |
|   | TECHNIQUE:  |
|   | CT scan of  |
|   | the head    |
|   | without     |
|   | contrast.   |
|   | 5-mm axial  |
|   | noncontrast |
|   |  images     |
|   | were        |
|   | acquired    |
|   | from the    |
|   | foramen     |
|   | magnum      |
|   | through the |
|   |  cranial    |
|   | vertex.     |
|   | Multiplanar |
|   |             |
|   | reformation |
|   | s were      |
|   | obtained    |
|   | from the    |
|   | acquisition |
|   |  data. At   |
|   | least one   |
|   | of the      |
|   | following   |
 
|   | CT dose     |
|   | optimizatio |
|   | n           |
|   | techniques  |
|   | were used:  |
|   | Automated   |
|   | exposure    |
|   | control;    |
|   | Adjustment  |
|   | of mA       |
|   | and/or kV   |
|   | according   |
|   | to patient  |
|   | size; Use   |
|   | of          |
|   | iterative   |
|   | reconstruct |
|   | ion         |
|   | technique.  |
|   | FINDINGS:   |
|   | Brain: No   |
|   | intracrania |
|   | l           |
|   | hemorrhage, |
|   |  midline    |
|   | shift or    |
|   | pathologic  |
|   | mass        |
|   | effect. No  |
|   | cerebral    |
|   | edema, mass |
|   |  lesion or  |
|   | evidence of |
|   |  acute      |
|   | infarct.    |
|   | Ventricles  |
|   | and         |
|   | extra-axial |
|   |  fluid      |
|   | spaces:     |
|   | Normal.     |
|   | Paranasal   |
|   | sinuses and |
|   |  mastoid    |
|   | air cells:  |
|   | Normal.     |
|   | Calvarium   |
|   | and         |
|   | extracrania |
|   | l soft      |
|   | tissues:    |
|   | Normal.     |
|   | Orbits: The |
|   |  patient is |
|   |  status     |
|   | post        |
|   | bilateral   |
|   | cataract    |
|   | surgery. 1. |
|   |   No acute  |
 
|   | intracrania |
|   | l           |
|   | pathology.  |
|   | Electronica |
|   | lly signed  |
|   | by Steven  |
|   | P Samano,   |
|   | MD on       |
|   | 2018  |
|   | 5:28        |
|   | PMX-ray     |
|   | Chest 1     |
|   | ViewResult  |
|   | Date:       |
|   | 2019LIN |
|   | [facility] AP[facility]  |
|   | WILFREDO XR   |
|   | CHEST 1     |
|   | VIEW        |
|   | 2019    |
|   | 4:43 PM     |
|   | HISTORY: 68 |
|   |  years.     |
|   | Female.     |
|   | Chest pain. |
|   |  TECHNIQUE: |
|   |  1 view of  |
|   | the chest   |
|   | obtained at |
|   |  1637       |
|   | hours.      |
|   | COMPARISON: |
|   |             |
|   | 2018. |
|   |  FINDINGS:  |
|   | A single    |
|   | lead ICD is |
|   |  seen over  |
|   | the right   |
|   | chest with  |
|   | an intact   |
|   | lead in     |
|   | proper      |
|   | position    |
|   | unchanged   |
|   | from the    |
|   | previous    |
|   | exam.  The  |
|   | sternotomy  |
|   | wires from  |
|   | prior CABG  |
|   | are noted.  |
|   |  An         |
|   | overlying   |
|   | EKG leads   |
|   | is seen.  A |
|   |  presumed   |
|   | loculated   |
|   | pleural     |
|   | fluid       |
 
|   | collection  |
|   | is seen     |
|   | overlying   |
|   | the left    |
|   | heart       |
|   | border      |
|   | similar to  |
|   | the         |
|   | previous    |
|   | exam.  This |
|   |  measures   |
|   | 3.0 x 5.2   |
|   | cm in the   |
|   | transverse  |
|   | and         |
|   | craniocauda |
|   | l           |
|   | dimensions. |
|   |   Hazy      |
|   | opacity is  |
|   | seen in the |
|   |  lower      |
|   | right chest |
|   |  which may  |
|   | indicate    |
|   | the         |
|   | presence of |
|   |  a          |
|   | right-sided |
|   |  pleural    |
|   | effusion    |
|   | which is    |
|   | layering    |
|   | posteriorly |
|   | .  No acute |
|   |  airspace   |
|   | disease,    |
|   | parenchymal |
|   |  nodule,    |
|   | mass or     |
|   | pneumothora |
|   | x is noted. |
|   |   Apical    |
|   | pleural     |
|   | thickening  |
|   | is seen.    |
|   | The osseous |
|   |  structures |
|   |  are        |
|   | intact. 1.  |
|   |  Small      |
|   | loculated   |
|   | pleural     |
|   | fluid       |
|   | collection  |
|   | seen        |
|   | overlying   |
|   | the lateral |
|   |  left heart |
|   |  border in  |
 
|   | the lower   |
|   | left chest. |
|   |   There may |
|   |  also be a  |
|   | small       |
|   | pleural     |
|   | effusion at |
|   |  the right  |
|   | lung base   |
|   | which is    |
|   | layering    |
|   | posteriorly |
|   | . 2.        |
|   | Single lead |
|   |  ICD and    |
|   | prior CABG  |
|   | are noted.  |
|   | Electronica |
|   | lly signed  |
|   | by Edward   |
|   | Iuliano, DO |
|   |  on         |
|   | 2019    |
|   | 4:59        |
|   | PMDischarge |
|   |             |
|   | Vitals:Milli |
|   | ls:         |
|   | 19    |
|   | 2301        |
|   | 19    |
|   | 0405        |
|   | 19    |
|   | 0720        |
|   | 19    |
|   | 1121 BP:    |
|   | 97/48       |
|   | 113/54      |
|   | 127/61      |
|   | 125/60 BP   |
|   | Location:   |
|   | Right upper |
|   |  arm Right  |
|   | upper arm   |
|   | Right upper |
|   |  arm Right  |
|   | upper arm   |
|   | Pulse: 68   |
|   | 68 80 76    |
|   | Resp: 18 18 |
|   |  16 16      |
|   | Temp: 99    |
|   |   F (37.2   |
|   |   C) 98.3   |
|   |   F (36.8   |
|   |   C) 98.7   |
|   |   F (37.1   |
|   |   C) 98.3   |
|   |   F (36.8   |
|   |   C)        |
 
|   | TempSrc:    |
|   | Oral Oral   |
|   | Oral Oral   |
|   | SpO2:  97%  |
|   | 99% 100%    |
|   | Weight:     |
|   |  Height:    |
|   |   Discharge |
|   |             |
|   | Exam:Genera |
|   | l           |
|   | Appearance: |
|   |     A & O x |
|   |  3, no      |
|   | distress,   |
|   | appears     |
|   | stated age  |
|   | Head:       |
|   | Normocephal |
|   | ic, without |
|   |  obvious    |
|   | abnormality |
|   | ,           |
|   | atraumatic  |
|   | Eyes:       |
|   | PERRL,      |
|   | conjunctiva |
|   | /corneas    |
|   | clear,      |
|   | EOM's       |
|   | intact.     |
|   | Ears:       |
|   | Normal      |
|   | external    |
|   | ear canals, |
|   |  no         |
|   | otorrhea    |
|   | Nose:       |
|   | Nares       |
|   | normal, no  |
|   | drainage or |
|   |  sinus      |
|   | tenderness  |
|   | Throat:     |
|   | Lips,       |
|   | mucosa, and |
|   |  tongue     |
|   | normal;     |
|   | gums normal |
|   |  Neck:      |
|   | Supple,     |
|   | symmetrical |
|   | , trachea;  |
|   | no carotid  |
|   | bruit or    |
|   | JVD Back:   |
|   |             |
|   | Symmetric,  |
|   | no          |
|   | curvature,  |
 
|   | ROM normal, |
|   |  no CVA     |
|   | tenderness  |
|   | Lungs:      |
|   | Clear to    |
|   | auscultatio |
|   | n           |
|   | bilaterally |
|   | ,           |
|   | respiration |
|   | s unlabored |
|   |  Chest      |
|   | Wall:    No |
|   |  tenderness |
|   |  or         |
|   | deformity   |
|   | Heart:      |
|   | Regular     |
|   | rate and    |
|   | rhythm, S1  |
|   | and S2      |
|   | normal, no  |
|   | murmur, rub |
|   |    or       |
|   | gallop      |
|   | Abdomen:    |
|   |   Soft,     |
|   | non-tender, |
|   |  bowel      |
|   | sounds      |
|   | active all  |
|   | four        |
|   | quadrants,  |
|   | no masses,  |
|   | no          |
|   | organomegal |
|   | y           |
|   | Genitalia:  |
|   |    Deferred |
|   |  Rectal:    |
|   |  Deferred   |
|   | Extremities |
|   | :   Upper   |
|   | extremities |
|   |  no         |
|   | clubbing/   |
|   | cyanosis/   |
|   | erythema    |
|   | Lower       |
|   | extremities |
|   |             |
|   | atraumatic, |
|   |  no         |
|   | cyanosis or |
|   |  edemaL     |
|   | foot        |
|   | Transmetata |
|   | rsal        |
|   | amputation  |
|   | - dressing  |
 
|   | in place.   |
|   | Pulses:     |
|   | 2+ and      |
|   | symmetric   |
|   | all         |
|   | extremities |
|   |  Skin:      |
|   | Skin warm,  |
|   | texture and |
|   |  turgor     |
|   | normal, no  |
|   | rashes or   |
|   | lesions     |
|   | Lymph       |
|   | nodes:   No |
|   |  gross      |
|   | lymphadenop |
|   | athy.       |
|   | Neurologic: |
|   | Psychiatric |
|   | :           |
|   | CNII-XII    |
|   | intact,     |
|   | normal      |
|   | strength,   |
|   | sensation   |
|   | normal      |
|   | Affect/     |
|   | mood        |
|   | normal,     |
|   | behavior    |
|   | and         |
|   | judgement   |
|   | normal      |
|   | LABS:       |
|   | Recent      |
|   | LabsLab     |
|   |  |
|   | 8           |
|   |  |
|   | 5           |
|   |  |
|   | 0 WBC 5.21  |
|   | 5.22 6.13   |
|   | HGB 8.7*    |
|   | 8.4* 8.7*   |
|   | HCT 26.3*   |
|   | 25.7* 27.0* |
|   |      |
|   | 149* 156    |
|   | NEUTOPHILPC |
|   | T 69.68     |
|   | 72.47 71.63 |
|   |  MONOPCT    |
|   | 9.79 8.92   |
|   | 12.18       |
|   | Recent      |
|   | LabsLab     |
|   |  |
|   | 8           |
 
|   |  |
|   | 5           |
|   |  |
|   | 0     |
|   | 138 136 K   |
|   | 4.5 4.5 4.8 |
|   |  CL 99 100  |
|   | 98* CO2 31  |
|   | 29 26 BUN   |
|   | 13 21 29*   |
|   | CREATININE  |
|   | 3.5* 4.5*   |
|   | 6.7* PROT   |
|   | 6.3 5.9*    |
|   | 6.4 BILITOT |
|   |  0.4 0.3    |
|   | 0.4 ALT 17  |
|   | 16 17 AST   |
|   | 23 21 24    |
|   | Phosphorus: |
|   |   No        |
|   | results for |
|   |  input(s):  |
|   | PHOS in the |
|   |  last 168   |
|   | hours.No    |
|   | results for |
|   |  input(s):  |
|   | MG in the   |
|   | last 168    |
|   | hours.No    |
|   | results for |
|   |  input(s):  |
|   | AMYLASE in  |
|   | the last    |
|   | 168         |
|   | hours.No    |
|   | results for |
|   |  input(s):  |
|   | LIPASE in   |
|   | the last    |
|   | 168         |
|   | hours.No    |
|   | results for |
|   |  input(s):  |
|   | PHART,      |
|   | PO2ART,     |
|   | QMV8ZLN,    |
|   | T6IDXYBT,   |
|   | BEART in    |
|   | the last    |
|   | 168         |
|   | hours.No    |
|   | results for |
|   |  input(s):  |
|   | APTT, INR,  |
|   | PTT in the  |
|   | last 168    |
|   | hours.No    |
|   | results for |
 
|   |  input(s):  |
|   | TSH,        |
|   | T3FREE,     |
|   | FREET4 in   |
|   | the last    |
|   | 168         |
|   | hours.Recen |
|   | t LabsLab   |
|   |  |
|   | 5           |
|   |  |
|   | 0           |
|   |  |
|   | 0           |
|   |  |
|   | 2 CKTOTAL   |
|   | --   --     |
|   | 22* 23*     |
|   | TROPONINI   |
|   | 0.256*      |
|   | 0.274*      |
|   | 0.318*  --  |
|   |  CKMBINDEX  |
|   |  --   --    |
|   | 8.2  --     |
|   | Disposition |
|   | :  SNFNo    |
|   | discharge   |
|   | procedures  |
|   | on          |
|   | file.Follow |
|   |  up:Ivy  |
|   | Couch,      |
|   |  [address]     |
|   | [address],   |
|   | [place]         |
|   | [address] |
|   | [place]        |
|   | [id number] |
|   | [phone] |
|   |  L Abdi, |
|   |  [id number]     |
|   | [address], |
|   |  [place]        |
|   | [address] |
|   |  [place]         |
|   | [id number] |
|   | [phone] |
|   |  daysJimmy  |
|   | Earl,       |
|   | [id number]    |
|   | [address]  |
|   | [address] |
|   | [place]       |
|   | [phone] |
|   | [phone] |
|   | i Couch     |
|   | Medication  |
|   | List  START |
|   |  taking     |
 
|   | these       |
|   | medications |
|   |             |
|   | CefTAZidime |
|   |  and        |
|   | Dextrose    |
|   | 2-5         |
|   | GM-%(50ML)  |
|   | SolrQTY:    |
|   | 30          |
|   | eachRefills |
|   | :           |
|   | 2Ceftazidim |
|   | e 2 gm iv   |
|   | after each  |
|   | hemodialysi |
|   | s until     |
|   | 19     |
|   | vancomycin  |
|   | 1000 mg     |
|   | injectionQT |
|   | Y:  7       |
|   | eachRefills |
|   | :           |
|   | 3Commonly   |
|   | known as:   |
|   | [id number] |
|   |  mg iv      |
|   | after each  |
|   | hemodialysi |
|   | s until     |
|   | 19 Cbc |
|   |  cmp esr    |
|   | crp         |
|   | vancomycin  |
|   | trough  q   |
|   | Monday      |
|   | Follow up   |
|   | with DR.    |
|   | Earl in 2   |
|   | weeks Call  |
|   | Tel         |
|   | [phone] |
|   | 2 to        |
|   | schedule    |
|   | follow up   |
|   | Fax lab     |
|   | results to  |
|   | [phone] |
|   | 1  CHANGE   |
|   | how you     |
|   | take these  |
|   | medications |
|   |             |
|   | atorvastati |
|   | n 40 MG     |
|   | tabletQTY:  |
|   |  30         |
|   | tabletRefil |
|   | ls:         |
 
|   | 0Commonly   |
|   | known as:   |
|   | LIPITORTake |
|   |  1 tablet   |
|   | by mouth    |
|   | nightly.Wha |
|   | t           |
|   | changed:    |
|   | medication  |
|   | strength    |
|   | how much to |
|   |  take   how |
|   |  to take    |
|   | this        |
|   | losartan 25 |
|   |  MG         |
|   | tabletQTY:  |
|   |  30         |
|   | tabletRefil |
|   | ls:         |
|   | 0Commonly   |
|   | known as:   |
|   | COZAARTake  |
|   | 1 tablet by |
|   |  mouth      |
|   | daily.What  |
|   | changed:    |
|   | how much to |
|   |  take       |
|   | CONTINUE    |
|   | taking      |
|   | these       |
|   | medications |
|   |   albuterol |
|   |  108 (90    |
|   | Base)       |
|   | MCG/ACT     |
|   | inhalerRefi |
|   | lls:        |
|   | 0Commonly   |
|   | known as:   |
|   | PROVENTIL   |
|   | HFA;VENTOLI |
|   | N HFA       |
|   | apixaban    |
|   | 2.5 MG      |
|   | tabletQTY:  |
|   |  60         |
|   | tabletRefil |
|   | ls:         |
|   | 0Commonly   |
|   | known as:   |
|   | ELIQUISTake |
|   |  1 tablet   |
|   | by mouth 2  |
|   | (two) times |
|   |  daily.     |
|   | aspirin 81  |
|   | MG EC       |
|   | tabletQTY:  |
 
|   |  30         |
|   | tabletRefil |
|   | ls:  0Take  |
|   | 1 tablet by |
|   |  mouth      |
|   | daily with  |
|   | breakfast.  |
|   | calcium     |
|   | acetate 667 |
|   |  MG         |
|   | capsuleRefi |
|   | lls:        |
|   | 0Commonly   |
|   | known as:   |
|   | PHOSLO      |
|   | carvedilol  |
|   | 3.125 MG    |
|   | tabletRefil |
|   | ls:         |
|   | 0Commonly   |
|   | known as:   |
|   | COREG       |
|   | clopidogrel |
|   |  75 MG      |
|   | tabletQTY:  |
|   |  30         |
|   | tabletRefil |
|   | ls:         |
|   | 11Commonly  |
|   | known as:   |
|   | PLAVIXTake  |
|   | 1 tablet by |
|   |  mouth      |
|   | daily.      |
|   | esomeprazol |
|   | e 20 MG     |
|   | capsuleRefi |
|   | lls:        |
|   | 0Commonly   |
|   | known as:   |
|   | NEXIUM      |
|   | furosemide  |
|   | 20 MG       |
|   | tabletRefil |
|   | ls:         |
|   | 0Commonly   |
|   | known as:   |
|   | LASIX       |
|   | HYDROcodone |
|   | -acetaminop |
|   | hen 5-325   |
|   | MG per      |
|   | tabletQTY:  |
|   |  30         |
|   | tabletRefil |
|   | ls:         |
|   | 0Commonly   |
|   | known as:   |
|   | NORCOTake 1 |
|   |  tablet by  |
 
|   | mouth every |
|   |  4 (four)   |
|   | hours as    |
|   | needed.     |
|   | insulin     |
|   | aspart 100  |
|   | UNIT/ML     |
|   | injectionRe |
|   | fills:      |
|   | 0Commonly   |
|   | known as:   |
|   | NOVOLOG     |
|   | insulin     |
|   | degludec    |
|   | 100 UNIT/ML |
|   |             |
|   | injectionRe |
|   | fills:      |
|   | 0Commonly   |
|   | known as:   |
|   | TRESIBA     |
|   | isosorbide  |
|   | mononitrate |
|   |  60 MG 24   |
|   | hr          |
|   | tabletQTY:  |
|   |  30         |
|   | tabletRefil |
|   | ls:         |
|   | 1Commonly   |
|   | known as:   |
|   | IMDURTake 1 |
|   |  tablet by  |
|   | mouth       |
|   | daily.      |
|   | loperamide  |
|   | 2 MG        |
|   | capsuleRefi |
|   | lls:        |
|   | 0Commonly   |
|   | known as:   |
|   | IMODIUM     |
|   | LORazepam 1 |
|   |  MG         |
|   | tabletQTY:  |
|   |  20         |
|   | tabletRefil |
|   | ls:         |
|   | 0Commonly   |
|   | known as:   |
|   | ATIVANTake  |
|   | 1 tablet by |
|   |  mouth      |
|   | daily as    |
|   | needed for  |
|   | Anxiety.    |
|   | nitroGLYCER |
|   | IN 0.4 MG   |
|   | SL          |
|   | tabletQTY:  |
 
|   |  30         |
|   | tabletRefil |
|   | ls:         |
|   | 0Doctor's   |
|   | comments:   |
|   | Hold for    |
|   | SBP less    |
|   | than        |
|   | 100Commonly |
|   |  known as:  |
|   |             |
|   | NITROSTATPl |
|   | ace 1       |
|   | tablet      |
|   | under the   |
|   | tongue      |
|   | every 5     |
|   | (five)      |
|   | minutes as  |
|   | needed for  |
|   | Chest pain. |
|   |             |
|   | nortriptyli |
|   | ne 25 MG    |
|   | capsuleRefi |
|   | lls:        |
|   | 0Commonly   |
|   | known as:   |
|   | PAMELOR     |
|   | ondansetron |
|   |  4 MG       |
|   | disintegrat |
|   | ing         |
|   | tabletRefil |
|   | ls:         |
|   | 0Commonly   |
|   | known as:   |
|   | ZOFRAN-ODT  |
|   | oxybutynin  |
|   | 5 MG        |
|   | tabletRefil |
|   | ls:         |
|   | 0Commonly   |
|   | known as:   |
|   | DITROPAN    |
|   | prochlorper |
|   | azine 5 MG  |
|   | tabletRefil |
|   | ls:  0      |
|   | rOPINIRole  |
|   | 0.25 MG     |
|   | tabletRefil |
|   | ls:         |
|   | 0Commonly   |
|   | known as:   |
|   | REQUIP      |
|   | SLOW-MAG    |
|   | 71.5-119 MG |
|   |             |
|   | TbecRefills |
 
|   | :  0Generic |
|   |  drug:      |
|   | Magnesium   |
|   | Cl-Calcium  |
|   | Carbonate   |
|   | Vitamin D3  |
|   | 5000 units  |
|   | CapsRefills |
|   | :  0        |
|   | Vortioxetin |
|   | e HBr 10 MG |
|   |             |
|   | TabsRefills |
|   | :  0  You   |
|   | might also  |
|   | be taking   |
|   | other       |
|   | medications |
|   |  not listed |
|   |  above. If  |
|   | you have    |
|   | questions   |
|   | about any   |
|   | of your     |
|   | other       |
|   | medications |
|   | , talk to   |
|   | the person  |
|   | who         |
|   | prescribed  |
|   | them or     |
|   | your        |
|   | Primary     |
|   | Care        |
|   | Provider.   |
|   |   STOP      |
|   | taking      |
|   | these       |
|   | medications |
|   |             |
|   | ranitidine  |
|   | 150 MG      |
|   | tabletCommo |
|   | nly known   |
|   | as:  [facility] |
|   |    Where to |
|   |  Get Your   |
|   | Medications |
|   |   These     |
|   | medications |
|   |  were sent  |
|   | to [facility]  |
|   | Pharmacy    |
|   | [id number] -      |
|   | [place],  |
|   | [place] - [address]   |
|   | [address]
|   |  [address]
|   [address]
|   |  [address]       |
 
|   | [place]   |
|   | [place] [id number]    |
|   | Phone:      |
|   | [phone] |
|   | 3           |
|   | atorvastati |
|   | n 40 MG     |
|   | tablet You  |
|   | can get     |
|   | these       |
|   | medications |
|   |  from any   |
|   | pharmacy    |
|   | Bring a     |
|   | paper       |
|   | prescriptio |
|   | n for each  |
|   | of these    |
|   | medications |
|   |             |
|   | CefTAZidime |
|   |  and        |
|   | Dextrose    |
|   | 2-5         |
|   | GM-%(50ML)  |
|   | Solr        |
|   | HYDROcodone |
|   | -acetaminop |
|   | hen 5-325   |
|   | MG per      |
|   | tablet      |
|   | LORazepam 1 |
|   |  MG         |
|   | tablet      |
|   | vancomycin  |
|   | 1000 mg     |
|   | injection   |
|   | Winston Vincent, |
|   |  DO2019 |
|   |  3:25       |
|   | PMDischarge |
|   |  took more  |
|   | than 35     |
|   | minutes, to |
|   |  include    |
|   | final       |
|   | examination |
|   | ,           |
|   | discussion  |
|   | of          |
|   | admission,  |
|   | and         |
|   | preparation |
|   |  of         |
|   | prescriptio |
|   | ns,         |
|   | instruction |
|   | s for       |
|   | ongoing     |
|   | care,       |
 
|   | follow up   |
|   | and         |
|   | dictation   |
|   | of summary. |
+---+-------------+
 from Last 3 Months
 
 Immunizations
 
 
+----------------------+-------------------------------------------+----------+
| Name                 | Dates Previously Given                    | Next Due |
+----------------------+-------------------------------------------+----------+
| Hepatitis B Adult    | 2016                                |          |
+----------------------+-------------------------------------------+----------+
| INFLUENZA PF,        | 2018 (Deferred:  - Pt states she    |          |
| QUADRIVALENT         | already had flu shot at dialysis clinic.  |          |
| (PED/ADOL/ADULT)     | Refused vax in hospital)                  |          |
+----------------------+-------------------------------------------+----------+
| Influenza, Trivalent | 2016                                |          |
|  W/Preservative      |                                           |          |
+----------------------+-------------------------------------------+----------+
| Pneumococcal         | 2012                                |          |
| Polysaccharide       |                                           |          |
| 23-valent            |                                           |          |
+----------------------+-------------------------------------------+----------+
 
 
 
 Family History
 
 
+------------------+-----------+------+----------+
| Medical History  | Relation  | Name | Comments |
+------------------+-----------+------+----------+
| High cholesterol | Father    | pvd  |          |
+------------------+-----------+------+----------+
| Hypertension     | Father    | pvd  |          |
+------------------+-----------+------+----------+
| Diabetes         | Paternal  |      |          |
|                  | Grandmoth |      |          |
|                  | er        |      |          |
+------------------+-----------+------+----------+
| Kidney disease   | Neg Hx    |      |          |
+------------------+-----------+------+----------+
| Malig hypertherm | Neg Hx    |      |          |
+------------------+-----------+------+----------+
 
 
 
+----------------------+----------+----------+----------+
| Relation             | Name     | Status   | Comments |
+----------------------+----------+----------+----------+
| Father               | pvd      |  |          |
+----------------------+----------+----------+----------+
| Mother               | dementia |  |          |
+----------------------+----------+----------+----------+
| Paternal Grandmother |          |          |          |
+----------------------+----------+----------+----------+
 
 
 
 
 Social History
 
 
+---------------+------------+-----------+--------+------------------+
| Tobacco Use   | Types      | Packs/Day | Years  | Date             |
|               |            |           | Used   |                  |
+---------------+------------+-----------+--------+------------------+
| Former Smoker | Cigarettes | 1         | 30     | Quit: 2004 |
+---------------+------------+-----------+--------+------------------+
 
 
 
+---------------------+---+---+---+
| Smokeless Tobacco:  |   |   |   |
| Never Used          |   |   |   |
+---------------------+---+---+---+
 
 
 
+-----------------------------------------+
| Tobacco Cessation: Counseling Given: No |
| Comments: quite smoking 2004            |
+-----------------------------------------+
 
 
 
+-------------+-----------+---------+----------+
| Alcohol Use | Drinks/We | oz/Week | Comments |
|             | ek        |         |          |
+-------------+-----------+---------+----------+
| No          |           |         |          |
+-------------+-----------+---------+----------+
 
 
 
+------------------+---------------+
| Sex Assigned at  | Date Recorded |
| Birth            |               |
+------------------+---------------+
| Not on file      |               |
+------------------+---------------+
 
 
 
 Last Filed Vital Signs
 
 
+-------------------+---------------------+-------------------------+
| Vital Sign        | Reading             | Time Taken              |
+-------------------+---------------------+-------------------------+
| Blood Pressure    | 109/52              | 2019 11:53 AM PST |
+-------------------+---------------------+-------------------------+
| Pulse             | 71                  | 2019 11:53 AM PST |
+-------------------+---------------------+-------------------------+
| Temperature       | 36.8   C (98.2   F) | 2019  2:40 PM PST |
+-------------------+---------------------+-------------------------+
| Respiratory Rate  | 18                  | 02/15/2019 11:30 AM PST |
+-------------------+---------------------+-------------------------+
 
| Oxygen Saturation | 96%                 | 2019 11:53 AM PST |
+-------------------+---------------------+-------------------------+
| Inhaled Oxygen    | -                   | -                       |
| Concentration     |                     |                         |
+-------------------+---------------------+-------------------------+
| Weight            | 65.3 kg (144 lb)    | 2019 10:29 PM PST |
+-------------------+---------------------+-------------------------+
| Height            | 177.8 cm (5' 10")   | 2019 10:29 PM PST |
+-------------------+---------------------+-------------------------+
| Body Mass Index   | 20.66               | 2019 10:29 PM PST |
+-------------------+---------------------+-------------------------+
 
 
 
 Plan of Treatment
 
 
+--------+---------+-----------+----------------------+-------------+
| Date   | Type    | Specialty | Care Team            | Description |
+--------+---------+-----------+----------------------+-------------+
| 04/10/ | Office  |           |   Srinivasan Abdi,  |             |
| 2019   | Visit   |           | DPM  [address], |             |
|        |         |           |  [address]  [place],  |             |
|        |         |           | [place] [address]             |             |
|        |         |           | [phone]         |             |
|        |         |           | [phone] (Fax)   |             |
+--------+---------+-----------+----------------------+-------------+
 
 
 
+----------------------+-----------+---------------------------------+----------+
| Health Maintenance   | Due Date  | Last Done                       | Comments |
+----------------------+-----------+---------------------------------+----------+
| Diabetic Eye Exam    |  |                                 |          |
|                      | 9         |                                 |          |
+----------------------+-----------+---------------------------------+----------+
| Vaccine:             |  |                                 |          |
| Dtap/Tdap/Td (1 -    | 8         |                                 |          |
| Tdap)                |           |                                 |          |
+----------------------+-----------+---------------------------------+----------+
| Breast Cancer        |  |                                 |          |
| Screening            | 9         |                                 |          |
| (Mammogram)          |           |                                 |          |
+----------------------+-----------+---------------------------------+----------+
| Colon Cancer         |  |                                 |          |
| Screening            | 9         |                                 |          |
| (Colonoscopy)        |           |                                 |          |
+----------------------+-----------+---------------------------------+----------+
| Vaccine: Zoster (1   |  |                                 |          |
| of 2)                | 9         |                                 |          |
+----------------------+-----------+---------------------------------+----------+
| DEXA SCAN SCREENING  |  |                                 |          |
|                      | 4         |                                 |          |
+----------------------+-----------+---------------------------------+----------+
| Vaccine:             |  | 2012                      |          |
| Pneumococcal 65+     | 4         |                                 |          |
| High/Highest Risk (1 |           |                                 |          |
|  of 2 - PCV13)       |           |                                 |          |
+----------------------+-----------+---------------------------------+----------+
| Lung Cancer          |  | 2017                      |          |
 
| Screening            | 8         |                                 |          |
+----------------------+-----------+---------------------------------+----------+
| Hemoglobin A1c       |  | 2018, 2018,         |          |
|                      | 9         | 2017, Additional history  |          |
|                      |           | exists                          |          |
+----------------------+-----------+---------------------------------+----------+
| Vaccine: Influenza   |  | 2016                      |          |
| (Season Ended)       | 9         |                                 |          |
+----------------------+-----------+---------------------------------+----------+
| Diabetic Foot Exam   |  | 2018, 2017          |          |
|                      | 9         |                                 |          |
+----------------------+-----------+---------------------------------+----------+
 
 
 
 Implants
 
 
+-------------------------------+-------+--------+-------------+--------+--------+--------+
| Implanted                     | Type  | Area   | Manufacture | Device | Expira | Model  |
|                               |       |        | r           |        | tion   | /      |
|                               |       |        |             | Identi | Date   | Serial |
|                               |       |        |             | fier   |        |  / Lot |
+-------------------------------+-------+--------+-------------+--------+--------+--------+
| Express                       | Stent | Arteri | BOSTON      |        |        | RENAL  |
| Sd-2017Implanted: Qty: 1 |       | al     | SCIENTIFIC  |        |        | /BILIA |
|  on 2017 by Kirt Mclaughlin, |       |        | MAURICE - [employer] |        |        | RY /   |
|  MD                           |       |        |             |        |        | /[id number] |
|                               |       |        |             |        |        | 046    |
+-------------------------------+-------+--------+-------------+--------+--------+--------+
| Express                       | Stent | Left:  | [employer]ON      |        |        | [id number] |
| Stent-2017Implanted: Qty: |       | Arteri | MEDICAL     |        |        | [id number] |
|  1 on 2017 by Kirt Mclaughlin  |       | al     | PRODUCTS    |        |        | 130    |
| MD GRAHAM                         |       |        |             |        |        | /[id number] |
|                               |       |        |             |        |        | [id number] |
|                               |       |        |             |        |        | 694    |
|                               |       |        |             |        |        | /41640 |
|                               |       |        |             |        |        | 695    |
+-------------------------------+-------+--------+-------------+--------+--------+--------+
| Synergy Georgi 3.5 X             | Stent | Corona | BOSTON      |        | / | [id number] |
| -2017Implanted: Qty: 1  |       | ry     | [employer]ENT[employer]  |        |    | [id number] |
| on 2017 by Cooper,      |       |        |             |        |        | 50 /   |
| Marcos BRADSHAW MD               |       |        |             |        |        | / |
|                               |       |        |             |        |        | 346    |
+-------------------------------+-------+--------+-------------+--------+--------+--------+
| Synergy Georgi 2.25 X            | Stent | [place] | [employer]      |        | / | [id number] |
| -2017Implanted: Qty: 1  |       | ry     | SCIENTIFIC  |        |    | [id number] |
| on 2017 by Cooper,      |       |        |             |        |        | 20 /   |
| Marcos BRADSHAW MD               |       |        |             |        |        | / |
|                               |       |        |             |        |        | 165    |
+-------------------------------+-------+--------+-------------+--------+--------+--------+
| Synergy Georgi 2.25 X            | Stent | [place] | [employer]      |        | / | [id number] |
| 8-2017Implanted: Qty: 1   |       | ry     | [employer]  |        |    | [id number] |
| on 2017 by Cooper,      |       |        |             |        |        | 20 /   |
| Marcos BRADSHAW MD               |       |        |             |        |        | / |
|                               |       |        |             |        |        | 438    |
+-------------------------------+-------+--------+-------------+--------+--------+--------+
| Epic                          | Stent | Right: | [employer]ON      |        | / | [id number] |
| Vascular-2019Implanted:  |       |        | SCIENTIFIC  |        |    | 2000 |
| Qty: 1 on 2019 by Arnoldo,  |       | Arteri |             |        |        | 020    |
 
| Kirt STEVENSON MD                     |       | al     |             |        |        | /N/A   |
|                               |       |        |             |        |        | /[id number] |
|                               |       |        |             |        |        | 135    |
+-------------------------------+-------+--------+-------------+--------+--------+--------+
| Supercable Iso Elastic        |       | Left:  | [employer]     |        | / |  |
| Cerclage System   1.5 Mm      |       | Femur  |             |        | 2018   | -1010  |
| PolymerImplanted: Qty: 1 on   |       |        |             |        |        | /      |
| 2016 by Black,          |       |        |             |        |        | / |
| MD Uriel                |       |        |             |        |        | 2      |
+-------------------------------+-------+--------+-------------+--------+--------+--------+
| Imp Hip Stem Bplr Slfctr      |       | Left:  | JJHCS DEPUY |        | / | 1035-4 |
| 48x28 - [id number]:    |       | Femur  |  ORTHO -    |        | 2020   | 8-000  |
| Qty: 1 on 2016 by       |       |        | DEPU        |        |        | /      |
| Uriel Roa MD         |       |        |             |        |        | /[id number] |
|                               |       |        |             |        |        | 3      |
+-------------------------------+-------+--------+-------------+--------+--------+--------+
| Corail Femoral StemImplanted: |       | Left:  | DEPUY       |        | / | [id number] |
|  Qty: 1 on 2016 by      |       | Femur  |             |        | 2020   | 3 / /  |
| Uriel Roa MD         |       |        |             |        |        |        |
+-------------------------------+-------+--------+-------------+--------+--------+--------+
| Imp Hip Fem Hd Artc 28mm Pls5 |       | Left:  | JJHCS DEPUY |        | / | 1365-1 |
|  - [id number]: Qty: 1  |       | Femur  |  ORTHO -    |        | 2020   | 2-000  |
| on 2016 by Black,       |       |        | DEPU        |        |        | /      |
| MD Uriel                |       |        |             |        |        | / |
|                               |       |        |             |        |        | 1034   |
+-------------------------------+-------+--------+-------------+--------+--------+--------+
| Xgrft Vascu-Guard Ptch 0.8x8  |       |        | HOOKER      |        | / | VG-010 |
| - SnaImplanted: Qty: 1 on     |       |        | BIOSCIENCE  |        |    | 8N /   |
| 10/25/2016 by Kirt Mclaughlin MD  |       |        | - OLGA      |        |        | /[id number] |
|                               |       |        |             |        |        |  |
|                               |       |        |             |        |        | 9414   |
+-------------------------------+-------+--------+-------------+--------+--------+--------+
 
 
 
 Procedures
 
 
+----------------------+--------+-------------+----------------------+----------------------
+
| Procedure Name       | Priori | Date/Time   | Associated Diagnosis | Comments             
|
|                      | ty     |             |                      |                      
|
+----------------------+--------+-------------+----------------------+----------------------
+
| XR FOOT RIGHT        | Routin | 2019  |   Toe pain, right    |   Results for this   
|
|                      | e      |  2:58 PM    |                      | procedure are in the 
|
|                      |        | PST         |                      |  results section.    
|
+----------------------+--------+-------------+----------------------+----------------------
+
| XR FOOT LEFT         | Routin | 2019  |   Post-operative     |   Results for this   
|
|                      | e      |  2:58 PM    | state                | procedure are in the 
|
|                      |        | PST         |                      |  results section.    
|
 
+----------------------+--------+-------------+----------------------+----------------------
+
| POCT GLUCOSE         | Routin | 02/15/2019  |                      |   Results for this   
|
|                      | e      | 11:36 AM    |                      | procedure are in the 
|
|                      |        | PST         |                      |  results section.    
|
+----------------------+--------+-------------+----------------------+----------------------
+
| POCT GLUCOSE         | Routin | 02/15/2019  |                      |   Results for this   
|
|                      | e      |  6:01 AM    |                      | procedure are in the 
|
|                      |        | PST         |                      |  results section.    
|
+----------------------+--------+-------------+----------------------+----------------------
+
| POCT GLUCOSE         | Routin | 2019  |                      |   Results for this   
|
|                      | e      | 10:32 PM    |                      | procedure are in the 
|
|                      |        | PST         |                      |  results section.    
|
+----------------------+--------+-------------+----------------------+----------------------
+
| [facility] SEPTIC LACTIC   | STAT   | 2019  |                      |   Results for this   
|
| ACID                 |        | 10:12 PM    |                      | procedure are in the 
|
|                      |        | PST         |                      |  results section.    
|
+----------------------+--------+-------------+----------------------+----------------------
+
| [facility] SEPTIC LACTIC   | STAT   | 2019  |                      |   Results for this   
|
| ACID                 |        |  7:22 PM    |                      | procedure are in the 
|
|                      |        | PST         |                      |  results section.    
|
+----------------------+--------+-------------+----------------------+----------------------
+
| TSH                  | STAT   | 2019  |                      |   Results for this   
|
|                      |        |  5:39 PM    |                      | procedure are in the 
|
|                      |        | PST         |                      |  results section.    
|
+----------------------+--------+-------------+----------------------+----------------------
+
| FOLATE               | Add-On | 2019  |                      |   Results for this   
|
|                      |        |  5:39 PM    |                      | procedure are in the 
|
|                      |        | PST         |                      |  results section.    
|
+----------------------+--------+-------------+----------------------+----------------------
+
| VITAMIN B12          | Add-On | 2019  |                      |   Results for this   
|
 
|                      |        |  5:39 PM    |                      | procedure are in the 
|
|                      |        | PST         |                      |  results section.    
|
+----------------------+--------+-------------+----------------------+----------------------
+
| SEDIMENTATION RATE,  | STAT   | 2019  |                      |   Results for this   
|
| AUTOMATED            |        |  5:39 PM    |                      | procedure are in the 
|
|                      |        | PST         |                      |  results section.    
|
+----------------------+--------+-------------+----------------------+----------------------
+
| COMPREHENSIVE        | STAT   | 2019  |                      |   Results for this   
|
| METABOLIC PANEL      |        |  5:39 PM    |                      | procedure are in the 
|
|                      |        | PST         |                      |  results section.    
|
+----------------------+--------+-------------+----------------------+----------------------
+
| CBC W/AUTO DIFF      | STAT   | 2019  |                      |   Results for this   
|
| (REFLEX TO MANUAL)   |        |  5:39 PM    |                      | procedure are in the 
|
|                      |        | PST         |                      |  results section.    
|
+----------------------+--------+-------------+----------------------+----------------------
+
| C-REACTIVE PROTEIN   | STAT   | 2019  |                      |   Results for this   
|
|                      |        |  5:39 PM    |                      | procedure are in the 
|
|                      |        | PST         |                      |  results section.    
|
+----------------------+--------+-------------+----------------------+----------------------
+
| BLOOD CULTURE, SET 2 | STAT   | 2019  |                      |   Results for this   
|
|                      |        |  5:39 PM    |                      | procedure are in the 
|
|                      |        | PST         |                      |  results section.    
|
+----------------------+--------+-------------+----------------------+----------------------
+
| DEVEN LEWIS RIGHT        | ASAP   | 2019  |                      |   Results for this   
|
|                      |        |  5:08 PM    |                      | procedure are in the 
|
|                      |        | PST         |                      |  results section.    
|
+----------------------+--------+-------------+----------------------+----------------------
+
| [facility] SEPTIC LACTIC   | STAT   | 2019  |                      |   Results for this   
|
| ACID                 |        |  4:55 PM    |                      | procedure are in the 
|
|                      |        | PST         |                      |  results section.    
|
 
+----------------------+--------+-------------+----------------------+----------------------
+
| BLOOD CULTURE, SET 1 | STAT   | 2019  |                      |   Results for this   
|
|                      |        |  4:55 PM    |                      | procedure are in the 
|
|                      |        | PST         |                      |  results section.    
|
+----------------------+--------+-------------+----------------------+----------------------
+
| ED INFORMATION       | Routin | 2019  |                      |   Results for this   
|
| EXCHANGE             | e      |  3:21 PM    |                      | procedure are in the 
|
|                      |        | PST         |                      |  results section.    
|
+----------------------+--------+-------------+----------------------+----------------------
+
| ED INFORMATION       | Routin | 2019  |                      |   Results for this   
|
| EXCHANGE             | e      |  3:58 PM    |                      | procedure are in the 
|
|                      |        | PST         |                      |  results section.    
|
+----------------------+--------+-------------+----------------------+----------------------
+
| POCT GLUCOSE         | Routin | 2019  |                      |   Results for this   
|
|                      | e      | 12:10 PM    |                      | procedure are in the 
|
|                      |        | PST         |                      |  results section.    
|
+----------------------+--------+-------------+----------------------+----------------------
+
| POCT GLUCOSE         | Routin | 2019  |                      |   Results for this   
|
|                      | e      |  5:55 AM    |                      | procedure are in the 
|
|                      |        | PST         |                      |  results section.    
|
+----------------------+--------+-------------+----------------------+----------------------
+
| PHOSPHOROUS          | Routin | 2019  |                      |   Results for this   
|
|                      | e      |  5:38 AM    |                      | procedure are in the 
|
|                      |        | PST         |                      |  results section.    
|
+----------------------+--------+-------------+----------------------+----------------------
+
| MAGNESIUM            | Routin | 2019  |                      |   Results for this   
|
|                      | e      |  5:38 AM    |                      | procedure are in the 
|
|                      |        | PST         |                      |  results section.    
|
+----------------------+--------+-------------+----------------------+----------------------
+
| BASIC METABOLIC      | Routin | 2019  |                      |   Results for this   
|
 
| PANEL                | e      |  5:38 AM    |                      | procedure are in the 
|
|                      |        | PST         |                      |  results section.    
|
+----------------------+--------+-------------+----------------------+----------------------
+
| CBC W/AUTO DIFF      | Routin | 2019  |                      |   Results for this   
|
| (REFLEX TO MANUAL)   | e      |  5:38 AM    |                      | procedure are in the 
|
|                      |        | PST         |                      |  results section.    
|
+----------------------+--------+-------------+----------------------+----------------------
+
| POCT GLUCOSE         | Routin | 2019  |                      |   Results for this   
|
|                      | e      |  9:29 PM    |                      | procedure are in the 
|
|                      |        | PST         |                      |  results section.    
|
+----------------------+--------+-------------+----------------------+----------------------
+
| POCT GLUCOSE         | Routin | 2019  |                      |   Results for this   
|
|                      | e      |  4:06 PM    |                      | procedure are in the 
|
|                      |        | PST         |                      |  results section.    
|
+----------------------+--------+-------------+----------------------+----------------------
+
| POCT GLUCOSE         | Routin | 2019  |                      |   Results for this   
|
|                      | e      | 12:13 PM    |                      | procedure are in the 
|
|                      |        | PST         |                      |  results section.    
|
+----------------------+--------+-------------+----------------------+----------------------
+
| EKG STANDARD 12 LEAD | Routin | 2019  |                      |   Results for this   
|
|                      | e      |  6:18 AM    |                      | procedure are in the 
|
|                      |        | PST         |                      |  results section.    
|
+----------------------+--------+-------------+----------------------+----------------------
+
| POCT GLUCOSE         | Routin | 2019  |                      |   Results for this   
|
|                      | e      |  5:21 AM    |                      | procedure are in the 
|
|                      |        | PST         |                      |  results section.    
|
+----------------------+--------+-------------+----------------------+----------------------
+
| BASIC METABOLIC      | Routin | 2019  |                      |   Results for this   
|
| PANEL                | e - AM |  4:53 AM    |                      | procedure are in the 
|
|                      |        | PST         |                      |  results section.    
|
 
+----------------------+--------+-------------+----------------------+----------------------
+
| CBC W/AUTO DIFF      | Routin | 2019  |                      |   Results for this   
|
| (REFLEX TO MANUAL)   | e - AM |  4:53 AM    |                      | procedure are in the 
|
|                      |        | PST         |                      |  results section.    
|
+----------------------+--------+-------------+----------------------+----------------------
+
| POCT GLUCOSE         | Routin | 2019  |                      |   Results for this   
|
|                      | e      |  9:00 PM    |                      | procedure are in the 
|
|                      |        | PST         |                      |  results section.    
|
+----------------------+--------+-------------+----------------------+----------------------
+
| IR STENT FEMORAL     | Routin | 2019  |                      |   Results for this   
|
| POPLITEAL            | e      |  6:45 PM    |                      | procedure are in the 
|
|                      |        | PST         |                      |  results section.    
|
+----------------------+--------+-------------+----------------------+----------------------
+
| IR AORTAGRAM         | Routin | 2019  |                      |   Results for this   
|
| ABDOMINAL            | e      |  6:45 PM    |                      | procedure are in the 
|
| SERIALOGRAM          |        | PST         |                      |  results section.    
|
+----------------------+--------+-------------+----------------------+----------------------
+
| IR ANGIOGRAM         | Routin | 2019  |                      |   Results for this   
|
| EXTREMITY RIGHT      | e      |  6:45 PM    |                      | procedure are in the 
|
|                      |        | PST         |                      |  results section.    
|
+----------------------+--------+-------------+----------------------+----------------------
+
| BLOOD CULTURE, SET 2 | Timed  | 2019  |                      |   Results for this   
|
|                      |        |  6:05 PM    |                      | procedure are in the 
|
|                      |        | PST         |                      |  results section.    
|
+----------------------+--------+-------------+----------------------+----------------------
+
| IR GUIDANCE VASCULAR | Routin | 2019  |                      |   Results for this   
|
|  ACCESS US           | e      |  5:40 PM    |                      | procedure are in the 
|
|                      |        | PST         |                      |  results section.    
|
+----------------------+--------+-------------+----------------------+----------------------
+
| C-REACTIVE PROTEIN   | STAT   | 2019  |                      |   Results for this   
|
 
|                      |        |  3:43 PM    |                      | procedure are in the 
|
|                      |        | PST         |                      |  results section.    
|
+----------------------+--------+-------------+----------------------+----------------------
+
| SEDIMENTATION RATE,  | STAT   | 2019  |                      |   Results for this   
|
| AUTOMATED            |        |  3:43 PM    |                      | procedure are in the 
|
|                      |        | PST         |                      |  results section.    
|
+----------------------+--------+-------------+----------------------+----------------------
+
| CK                   | STAT   | 2019  |                      |   Results for this   
|
|                      |        |  3:43 PM    |                      | procedure are in the 
|
|                      |        | PST         |                      |  results section.    
|
+----------------------+--------+-------------+----------------------+----------------------
+
| COMPREHENSIVE        | STAT   | 2019  |                      |   Results for this   
|
| METABOLIC PANEL      |        |  3:43 PM    |                      | procedure are in the 
|
|                      |        | PST         |                      |  results section.    
|
+----------------------+--------+-------------+----------------------+----------------------
+
| CBC W/MANUAL DIFF    | STAT   | 2019  |                      |   Results for this   
|
|                      |        |  3:43 PM    |                      | procedure are in the 
|
|                      |        | PST         |                      |  results section.    
|
+----------------------+--------+-------------+----------------------+----------------------
+
| BLOOD CULTURE, SET 1 | Timed  | 2019  |                      |   Results for this   
|
|                      |        |  3:43 PM    |                      | procedure are in the 
|
|                      |        | PST         |                      |  results section.    
|
+----------------------+--------+-------------+----------------------+----------------------
+
| US LOWER EXTREMITY   | STAT   | 2019  |                      |   Results for this   
|
| ARTERIAL RIGHT       |        |  2:23 PM    |                      | procedure are in the 
|
|                      |        | PST         |                      |  results section.    
|
+----------------------+--------+-------------+----------------------+----------------------
+
| ED INFORMATION       | Routin | 2019  |                      |   Results for this   
|
| EXCHANGE             | e      |  1:03 PM    |                      | procedure are in the 
|
|                      |        | PST         |                      |  results section.    
|
 
+----------------------+--------+-------------+----------------------+----------------------
+
| POCT GLUCOSE         | Routin | 2019  |                      |   Results for this   
|
|                      | e      | 11:45 AM    |                      | procedure are in the 
|
|                      |        | PST         |                      |  results section.    
|
+----------------------+--------+-------------+----------------------+----------------------
+
| CBC W/MANUAL DIFF    | Routin | 2019  |                      |   Results for this   
|
|                      | e      |  5:54 AM    |                      | procedure are in the 
|
|                      |        | PST         |                      |  results section.    
|
+----------------------+--------+-------------+----------------------+----------------------
+
| SEDIMENTATION RATE,  | Routin | 2019  |                      |   Results for this   
|
| AUTOMATED            | e - AM |  5:54 AM    |                      | procedure are in the 
|
|                      |        | PST         |                      |  results section.    
|
+----------------------+--------+-------------+----------------------+----------------------
+
| C-REACTIVE PROTEIN   | Routin | 2019  |                      |   Results for this   
|
|                      | e - AM |  5:54 AM    |                      | procedure are in the 
|
|                      |        | PST         |                      |  results section.    
|
+----------------------+--------+-------------+----------------------+----------------------
+
| COMPREHENSIVE        | Routin | 2019  |                      |   Results for this   
|
| METABOLIC PANEL      | e - AM |  5:54 AM    |                      | procedure are in the 
|
|                      |        | PST         |                      |  results section.    
|
+----------------------+--------+-------------+----------------------+----------------------
+
| POCT GLUCOSE         | Routin | 2019  |                      |   Results for this   
|
|                      | e      |  5:22 AM    |                      | procedure are in the 
|
|                      |        | PST         |                      |  results section.    
|
+----------------------+--------+-------------+----------------------+----------------------
+
| POCT GLUCOSE         | Routin | 2019  |                      |   Results for this   
|
|                      | e      |  9:02 PM    |                      | procedure are in the 
|
|                      |        | PST         |                      |  results section.    
|
+----------------------+--------+-------------+----------------------+----------------------
+
| POCT GLUCOSE         | Routin | 2019  |                      |   Results for this   
|
 
|                      | e      |  4:16 PM    |                      | procedure are in the 
|
|                      |        | PST         |                      |  results section.    
|
+----------------------+--------+-------------+----------------------+----------------------
+
| POCT GLUCOSE         | Routin | 2019  |                      |   Results for this   
|
|                      | e      | 12:17 PM    |                      | procedure are in the 
|
|                      |        | PST         |                      |  results section.    
|
+----------------------+--------+-------------+----------------------+----------------------
+
| RENAL FUNCTION PANEL | STAT   | 2019  |                      |   Results for this   
|
|                      |        |  8:47 AM    |                      | procedure are in the 
|
|                      |        | PST         |                      |  results section.    
|
+----------------------+--------+-------------+----------------------+----------------------
+
| CBC W/AUTO DIFF      | STAT   | 2019  |                      |   Results for this   
|
| (REFLEX TO MANUAL)   |        |  8:47 AM    |                      | procedure are in the 
|
|                      |        | PST         |                      |  results section.    
|
+----------------------+--------+-------------+----------------------+----------------------
+
| EKG STANDARD 12 LEAD | Routin | 2019  |                      |   Results for this   
|
|                      | e      |  5:50 AM    |                      | procedure are in the 
|
|                      |        | PST         |                      |  results section.    
|
+----------------------+--------+-------------+----------------------+----------------------
+
| POCT GLUCOSE         | Routin | 2019  |                      |   Results for this   
|
|                      | e      |  5:18 AM    |                      | procedure are in the 
|
|                      |        | PST         |                      |  results section.    
|
+----------------------+--------+-------------+----------------------+----------------------
+
| POCT GLUCOSE         | Routin | 2019  |                      |   Results for this   
|
|                      | e      | 10:42 PM    |                      | procedure are in the 
|
|                      |        | PST         |                      |  results section.    
|
+----------------------+--------+-------------+----------------------+----------------------
+
| POCT GLUCOSE         | Routin | 2019  |                      |   Results for this   
|
|                      | e      |  9:16 PM    |                      | procedure are in the 
|
|                      |        | PST         |                      |  results section.    
|
 
+----------------------+--------+-------------+----------------------+----------------------
+
| TROPONIN I           | Timed  | 2019  |                      |   Results for this   
|
|                      |        |  5:47 PM    |                      | procedure are in the 
|
|                      |        | PST         |                      |  results section.    
|
+----------------------+--------+-------------+----------------------+----------------------
+
| POCT GLUCOSE         | Routin | 2019  |                      |   Results for this   
|
|                      | e      |  4:56 PM    |                      | procedure are in the 
|
|                      |        | PST         |                      |  results section.    
|
+----------------------+--------+-------------+----------------------+----------------------
+
| EKG STANDARD 12 LEAD | STAT   | 2019  |                      |   Results for this   
|
|                      |        |  3:01 PM    |                      | procedure are in the 
|
|                      |        | PST         |                      |  results section.    
|
+----------------------+--------+-------------+----------------------+----------------------
+
| TROPONIN I           | Timed  | 2019  |                      |   Results for this   
|
|                      |        |  2:11 PM    |                      | procedure are in the 
|
|                      |        | PST         |                      |  results section.    
|
+----------------------+--------+-------------+----------------------+----------------------
+
| ED ISTAT TROPONIN    | STAT   | 2019  |                      |   Results for this   
|
|                      |        | 10:26 AM    |                      | procedure are in the 
|
|                      |        | PST         |                      |  results section.    
|
+----------------------+--------+-------------+----------------------+----------------------
+
| POC CARDIAC TROPONIN | Routin | 2019  |                      |   Results for this   
|
|                      | e      |  9:54 AM    |                      | procedure are in the 
|
|                      |        | PST         |                      |  results section.    
|
+----------------------+--------+-------------+----------------------+----------------------
+
| ED INFORMATION       | Routin | 2019  |                      |   Results for this   
|
| EXCHANGE             | e      |  9:47 AM    |                      | procedure are in the 
|
|                      |        | PST         |                      |  results section.    
|
+----------------------+--------+-------------+----------------------+----------------------
+
| EKG 12 LEAD UNIT     | Routin | 2019  |                      |   Results for this   
|
 
| PERFORMED            | e      |  9:44 AM    |                      | procedure are in the 
|
|                      |        | PST         |                      |  results section.    
|
+----------------------+--------+-------------+----------------------+----------------------
+
| POCT GLUCOSE         | Routin | 2019  |                      |   Results for this   
|
|                      | e      | 11:23 AM    |                      | procedure are in the 
|
|                      |        | PST         |                      |  results section.    
|
+----------------------+--------+-------------+----------------------+----------------------
+
| POCT GLUCOSE         | Routin | 2019  |                      |   Results for this   
|
|                      | e      |  5:39 AM    |                      | procedure are in the 
|
|                      |        | PST         |                      |  results section.    
|
+----------------------+--------+-------------+----------------------+----------------------
+
| COMPREHENSIVE        | Routin | 2019  |                      |   Results for this   
|
| METABOLIC PANEL      | e      |  4:58 AM    |                      | procedure are in the 
|
|                      |        | PST         |                      |  results section.    
|
+----------------------+--------+-------------+----------------------+----------------------
+
| CBC W/AUTO DIFF      | Routin | 2019  |                      |   Results for this   
|
| (REFLEX TO MANUAL)   | e      |  4:58 AM    |                      | procedure are in the 
|
|                      |        | PST         |                      |  results section.    
|
+----------------------+--------+-------------+----------------------+----------------------
+
| POCT GLUCOSE         | Routin | 2019  |                      |   Results for this   
|
|                      | e      |  8:58 PM    |                      | procedure are in the 
|
|                      |        | PST         |                      |  results section.    
|
+----------------------+--------+-------------+----------------------+----------------------
+
| POCT GLUCOSE         | Routin | 2019  |                      |   Results for this   
|
|                      | e      |  4:09 PM    |                      | procedure are in the 
|
|                      |        | PST         |                      |  results section.    
|
+----------------------+--------+-------------+----------------------+----------------------
+
| POCT GLUCOSE         | Routin | 2019  |                      |   Results for this   
|
|                      | e      | 11:00 AM    |                      | procedure are in the 
|
|                      |        | PST         |                      |  results section.    
|
 
+----------------------+--------+-------------+----------------------+----------------------
+
| POCT GLUCOSE         | Routin | 2019  |                      |   Results for this   
|
|                      | e      |  5:38 AM    |                      | procedure are in the 
|
|                      |        | PST         |                      |  results section.    
|
+----------------------+--------+-------------+----------------------+----------------------
+
| COMPREHENSIVE        | Routin | 2019  |                      |   Results for this   
|
| METABOLIC PANEL      | e      |  5:25 AM    |                      | procedure are in the 
|
|                      |        | PST         |                      |  results section.    
|
+----------------------+--------+-------------+----------------------+----------------------
+
| CBC W/AUTO DIFF      | Routin | 2019  |                      |   Results for this   
|
| (REFLEX TO MANUAL)   | e      |  5:25 AM    |                      | procedure are in the 
|
|                      |        | PST         |                      |  results section.    
|
+----------------------+--------+-------------+----------------------+----------------------
+
 from Last 3 Months
 
 Results
 X-ray foot right 3+ views (2019  2:58 PM)
 
+------------------------------------------------------------------------+--------------+
| Impressions                                                            | Performed At |
+------------------------------------------------------------------------+--------------+
|   Linear lucency/irregularity at the distal 1st phalanx, may represent |   [facility]     |
|   minimally displaced fracture.  Signed by: Oleksandr Lemus  Sign         | RADIOLOGY    |
| Date/Time: 2019 10:20 AM                                         |              |
+------------------------------------------------------------------------+--------------+
 
 
 
+------------------------------------------------------------------------+--------------+
| Narrative                                                              | Performed At |
+------------------------------------------------------------------------+--------------+
|   RIGHT FOOT THREE VIEWS  CLINICAL INFORMATION:  Possible fracture of  |   [facility]     |
| right hallux.  COMPARISON:  XR TOES RIGHT (2019); XR FOOT LEFT    | RADIOLOGY    |
| (2018);  FINDINGS:  No acute fracture or dislocation.  Small     |              |
| calcaneal plantar enthesophyte.  Linear lucency/irregularity at the    |              |
| distal 1st phalanx, may represent  minimally displaced fracture.  Mild |              |
|  midfoot degeneration.                                                 |              |
+------------------------------------------------------------------------+--------------+
 
 
 
+-------------------------------------------------------------------------+
| Procedure Note                                                          |
+-------------------------------------------------------------------------+
|   Lauro Baldwin Results In 2019 10:23 AM PST  RIGHT FOOT THREE VIEWS |
| CLINICAL INFORMATION:                                                   |
| Possible fracture of right hallux.                                      |
 
| COMPARISON:                                                             |
| XR TOES RIGHT (2019); XR FOOT LEFT (2018);                   |
| FINDINGS:                                                               |
| No acute fracture or dislocation.                                       |
| Small calcaneal plantar enthesophyte.                                   |
| Linear lucency/irregularity at the distal 1st phalanx, may represent    |
| minimally displaced fracture.                                           |
| Mild midfoot degeneration.                                              |
| IMPRESSION:                                                             |
| Linear lucency/irregularity at the distal 1st phalanx, may represent    |
| minimally displaced fracture.                                           |
| Signed by: Oleksandr Lemus                                                 |
| Sign Date/Time: 2019 10:20 AM                                     |
+-------------------------------------------------------------------------+
 
 
 
+--------------------+------------------+--------------------+--------------+
| Performing         | Address          | City/State/RUSTcode | Phone Number |
| Organization       |                  |                    |              |
+--------------------+------------------+--------------------+--------------+
|   [facility] |   [address] | [place] [address] |              |
+--------------------+------------------+--------------------+--------------+
 X-ray foot left 3 + views (2019  2:58 PM)
 
+----------------------------------------------------------------------+--------------+
| Impressions                                                          | Performed At |
+----------------------------------------------------------------------+--------------+
|   No acute fracture.    No radiographic evidence for osteomyelitis   |   [facility]     |
| Signed by: Oleksandr Lemus  Sign Date/Time: 2019 10:21 AM         | RADIOLOGY    |
+----------------------------------------------------------------------+--------------+
 
 
 
+------------------------------------------------------------------------+--------------+
| Narrative                                                              | Performed At |
+------------------------------------------------------------------------+--------------+
|   LEFT FOOT THREE VIEWS  CLINICAL INFORMATION:  8 weeks post op,       |   [facility]     |
| forefoot amputation.  COMPARISON:  XR TOES RIGHT (2019); XR FOOT  | RADIOLOGY    |
| LEFT (2018); XR FOOT LEFT  (2018);  FINDINGS:  Amputation  |              |
| of the forefoot at the level of the proximal metacarpals.  No          |              |
| radiographic evidence for osteomyelitis.  No acute fractures.          |              |
+------------------------------------------------------------------------+--------------+
 
 
 
+------------------------------------------------------------------------+
| Procedure Note                                                         |
+------------------------------------------------------------------------+
|   Lauro Baldwin Results In - 2019 10:25 AM PST  LEFT FOOT THREE VIEWS |
| CLINICAL INFORMATION:                                                  |
| 8 weeks post op, forefoot amputation.                                  |
| COMPARISON:                                                            |
| XR TOES RIGHT (2019); XR FOOT LEFT (2018); XR FOOT LEFT     |
| (2018);                                                          |
| FINDINGS:                                                              |
| Amputation of the forefoot at the level of the proximal metacarpals.   |
| No radiographic evidence for osteomyelitis.                            |
| No acute fractures.                                                    |
| IMPRESSION:                                                            |
 
| No acute fracture.  No radiographic evidence for osteomyelitis         |
| Signed by: Oleksandr Lemus                                                |
| Sign Date/Time: 2019 10:21 AM                                    |
+------------------------------------------------------------------------+
 
 
 
+--------------------+------------------+--------------------+--------------+
| Performing         | Address          | City/State/Zipcode | Phone Number |
| Organization       |                  |                    |              |
+--------------------+------------------+--------------------+--------------+
|   [facility] |   [address] | [place] [address] |              |
+--------------------+------------------+--------------------+--------------+
 POCT glucose (02/15/2019 11:36 AM)Only the most recent of 25 results within the time period
 is included.
 
+--------------------+--------------------------+---------------+-----------------+
| Component          | Value                    | Ref Range     | Performed At    |
+--------------------+--------------------------+---------------+-----------------+
| GLUCOSE,POC SCREEN | 231 (H)Comment: Testing  | 65 - 99 mg/dL | [facility] |
|                    | performed at [facility];[address]     |               |                 |
|                    | [address];[place]   |               |                 |
|                    | [id number]                    |               |                 |
+--------------------+--------------------------+---------------+-----------------+
 
 
 
+-------------------+------------------+--------------------+--------------+
| Performing        | Address          | City/State/Zipcode | Phone Number |
| Organization      |                  |                    |              |
+-------------------+------------------+--------------------+--------------+
|   [facility] |   [address] | [place] [address] |              |
+-------------------+------------------+--------------------+--------------+
 Septic Lactic Acid (2019 10:12 PM)Only the most recent of 3 results within the time krystian sparks is included.
 
+-------------+-------------------------+------------------+-----------------+
| Component   | Value                   | Ref Range        | Performed At    |
+-------------+-------------------------+------------------+-----------------+
| LACTIC ACID | 2.0Comment: Testing     | 0.4 - 2.0 mmol/L | [facility] |
|             | performed at [facility];[address]    |                  |                 |
|             | [address];[place], [place]  |                  |                 |
|             | [id number]                   |                  |                 |
+-------------+-------------------------+------------------+-----------------+
 
 
 
+-------------------+------------------+--------------------+--------------+
| Performing        | Address          | City/State/Zipcode | Phone Number |
| Organization      |                  |                    |              |
+-------------------+------------------+--------------------+--------------+
|   [facility] |   [address] | [place] [address] |              |
+-------------------+------------------+--------------------+--------------+
 Blood Culture Set 2 (2019  5:39 PM)Only the most recent of 2 results within the time 
period is included.
 
+----------------------+------------------------+-----------+-----------------+
| Component            | Value                  | Ref Range | Performed At    |
+----------------------+------------------------+-----------+-----------------+
| Specimen Description | BLOOD, PERIPHERAL DRAW |           | [facility]      |
 
|                      |                        |           | LABORATORY      |
+----------------------+------------------------+-----------+-----------------+
| SPECIAL REQUESTS     | R HAND                 |           | [facility] |
+----------------------+------------------------+-----------+-----------------+
| CULTURE              | NO GROWTH 6 DAYS       |           | [facility]      |
|                      |                        |           | LABORATORY      |
+----------------------+------------------------+-----------+-----------------+
 
 
 
+-----------------+
| Specimen        |
+-----------------+
| Blood - Blood,  |
| peripheral draw |
+-----------------+
 
 
 
+-------------------+-------------------------+---------------------+----------------+
| Performing        | Address                 | City/State/Zipcode  | Phone Number   |
| Organization      |                         |                     |                |
+-------------------+-------------------------+---------------------+----------------+
|   [facility]      |   [address]  | [place] [address] |   [phone] |
| LABORATORY        | [address]                   |                     |                |
+-------------------+-------------------------+---------------------+----------------+
|   [facility] |   [address]        | [place] [address]  |                |
+-------------------+-------------------------+---------------------+----------------+
 Sedimentation Rate (ESR) (2019  5:39 PM)Only the most recent of 3 results within the 
time period is included.
 
+-----------+-------------------------+--------------+-----------------+
| Component | Value                   | Ref Range    | Performed At    |
+-----------+-------------------------+--------------+-----------------+
| ESR       | 13Comment: Testing      | 0 - 30 mm/Hr | [facility] |
|           | performed at [facility];[address]    |              |                 |
|           | [address];[place], [place]  |              |                 |
|           | [id number]                   |              |                 |
+-----------+-------------------------+--------------+-----------------+
 
 
 
+----------+
| Specimen |
+----------+
| Blood    |
+----------+
 
 
 
+-------------------+------------------+--------------------+--------------+
| Performing        | Address          | City/State/Zipcode | Phone Number |
| Organization      |                  |                    |              |
+-------------------+------------------+--------------------+--------------+
|   [facility] |   [address] | [place] [address] |              |
+-------------------+------------------+--------------------+--------------+
 CBC with differential (2019  5:39 PM)Only the most recent of 6 results within the rebekah
e period is included.
 
+-------------------+------------------------+-------------------+-----------------+
 
| Component         | Value                  | Ref Range         | Performed At    |
+-------------------+------------------------+-------------------+-----------------+
| WBC               | 5.14                   | 3.80 - 11.00 K/uL | [facility] |
+-------------------+------------------------+-------------------+-----------------+
| RBC               | 2.91 (L)               | 3.70 - 5.10 M/uL  | [facility] |
+-------------------+------------------------+-------------------+-----------------+
| HGB               | 10.1 (L)               | 11.3 - 15.5 g/dL  | [facility] |
+-------------------+------------------------+-------------------+-----------------+
| HCT               | 30.9 (L)               | 34.0 - 46.0 %     | [facility] |
+-------------------+------------------------+-------------------+-----------------+
| MCV               | 106.1 (H)              | 80.0 - 100.0 fl   | [facility] |
+-------------------+------------------------+-------------------+-----------------+
| MCH               | 34.8 (H)               | 27.0 - 34.0 pg    | [facility] |
+-------------------+------------------------+-------------------+-----------------+
| MCHC              | 32.8                   | 32.0 - 35.5 g/dL  | [facility] |
+-------------------+------------------------+-------------------+-----------------+
| RDW SD            | 61.3 (H)               | 37 - 53 fl        | [facility] |
+-------------------+------------------------+-------------------+-----------------+
| PLT               | 145 (L)                | 150 - 400 K/uL    | [facility] |
+-------------------+------------------------+-------------------+-----------------+
| MPV               | 9.3                    | fl                | [facility] |
+-------------------+------------------------+-------------------+-----------------+
| DIFF TYPE         | AUTOMATED              |                   | [facility] |
+-------------------+------------------------+-------------------+-----------------+
| NEUTROPHILS       | 74.03                  | %                 | [facility] |
+-------------------+------------------------+-------------------+-----------------+
| LYMPHOCYTES       | 20.20                  | %                 | [facility] |
+-------------------+------------------------+-------------------+-----------------+
| MONOCYTES         | 5.16                   | %                 | [facility] |
+-------------------+------------------------+-------------------+-----------------+
| EOSINOPHILS       | 0.03                   | %                 | [facility] |
+-------------------+------------------------+-------------------+-----------------+
| BASOPHILS         | 0.58                   | %                 | [facility] |
+-------------------+------------------------+-------------------+-----------------+
| NEUTROPHILS ABS   | 3.81                   | 1.90 - 7.40 K/uL  | [facility] |
+-------------------+------------------------+-------------------+-----------------+
| LYMPHOCYTES ABS   | 1.04                   | 1.00 - 3.90 K/uL  | [facility] |
+-------------------+------------------------+-------------------+-----------------+
| MONOCYTES ABS     | 0.27                   | 0.00 - 0.80 K/uL  | [facility] |
+-------------------+------------------------+-------------------+-----------------+
| EOSINOPHILS ABS   | 0.00                   | 0.00 - 0.50 K/uL  | [facility] |
+-------------------+------------------------+-------------------+-----------------+
| BASOPHILS ABS     | 0.03                   | 0.00 - 0.10 K/uL  | [facility] |
+-------------------+------------------------+-------------------+-----------------+
| MORPHOLOGY        | 1+                     |                   | [facility] |
|                   | Comment:               |                   |                 |
|                   | ANISO                  |                   |                 |
|                   | 1+                     |                   |                 |
|                   | MACRO                  |                   |                 |
|                   | NORMAL PLT MORPH       |                   |                 |
|                   |                        |                   |                 |
+-------------------+------------------------+-------------------+-----------------+
| Platelet Estimate | DECREASEDComment:      |                   | [facility] |
|                   | Testing performed at   |                   |                 |
|                   | [facility];[address]          |                   |                 |
|                   | [address];[place] [address] |                   |                 |
+-------------------+------------------------+-------------------+-----------------+
 
 
 
 
+----------+
| Specimen |
+----------+
| Blood    |
+----------+
 
 
 
+-------------------+------------------+--------------------+--------------+
| Performing        | Address          | City/State/Zipcode | Phone Number |
| Organization      |                  |                    |              |
+-------------------+------------------+--------------------+--------------+
|   [facility] |   [address] | [place] [address] |              |
+-------------------+------------------+--------------------+--------------+
 C-Reactive Protein (2019  5:39 PM)Only the most recent of 3 results within the time krystian sparks is included.
 
+-----------+--------------------------+------------+-----------------+
| Component | Value                    | Ref Range  | Performed At    |
+-----------+--------------------------+------------+-----------------+
| CRP       | 0.8 (H)Comment: Testing  | <0.5 mg/dL | [facility] |
|           | performed at [facility];[address]     |            |                 |
|           | [address];[place]   |            |                 |
|           | [id number]                    |            |                 |
+-----------+--------------------------+------------+-----------------+
 
 
 
+----------+
| Specimen |
+----------+
| Blood    |
+----------+
 
 
 
+-------------------+------------------+--------------------+--------------+
| Performing        | Address          | City/State/Zipcode | Phone Number |
| Organization      |                  |                    |              |
+-------------------+------------------+--------------------+--------------+
|   [facility] |   [address] | [place] [address] |              |
+-------------------+------------------+--------------------+--------------+
 TSH (2019  5:39 PM)
 
+-----------+-------------------------+----------------------+-----------------+
| Component | Value                   | Ref Range            | Performed At    |
+-----------+-------------------------+----------------------+-----------------+
| TSH       | 0.904Comment: Testing   | 0.450 - 5.100 uIU/mL | [facility] |
|           | performed at [facility];[address]    |                      |                 |
|           | [address];[place]  |                      |                 |
|           | [id number]                   |                      |                 |
+-----------+-------------------------+----------------------+-----------------+
 
 
 
+----------+
| Specimen |
+----------+
| Blood    |
+----------+
 
 
 
 
+-------------------+------------------+--------------------+--------------+
| Performing        | Address          | City/State/Zipcode | Phone Number |
| Organization      |                  |                    |              |
+-------------------+------------------+--------------------+--------------+
|   [facility] |   [address] | [place] [address] |              |
+-------------------+------------------+--------------------+--------------+
 Folate (2019  5:39 PM)
 
+-----------+--------------------------+------------+--------------+
| Component | Value                    | Ref Range  | Performed At |
+-----------+--------------------------+------------+--------------+
| FOLATE    | 8.0Comment: Testing      | >5.4 ng/mL | [facility]   |
|           | performed at [facility] |            | LABORATORY   |
|           |  [facility]        |            |              |
|           | [place]  [address]     |            |              |
+-----------+--------------------------+------------+--------------+
 
 
 
+----------+
| Specimen |
+----------+
| Blood    |
+----------+
 
 
 
+---------------+-------------------------+---------------------+----------------+
| Performing    | Address                 | City/State/Zipcode  | Phone Number   |
| Organization  |                         |                     |                |
+---------------+-------------------------+---------------------+----------------+
|   [facility]  |   [address]  | [place] [address] |   [phone] |
| LABORATORY    | [address]                   |                     |                |
+---------------+-------------------------+---------------------+----------------+
 Vitamin B12 (2019  5:39 PM)
 
+-------------+--------------------------+-------------------+--------------+
| Component   | Value                    | Ref Range         | Performed At |
+-------------+--------------------------+-------------------+--------------+
| VITAMIN B12 | 553Comment: Testing      | 254 - 1,320 pg/mL | [facility]   |
|             | performed at [facility], [address] |                   | LABORATORY   |
|             |  [address],        |                   |              |
|             | [place]  [address]     |                   |              |
+-------------+--------------------------+-------------------+--------------+
 
 
 
+----------+
| Specimen |
+----------+
| Blood    |
+----------+
 
 
 
+---------------+-------------------------+---------------------+----------------+
| Performing    | Address                 | City/State/Zipcode  | Phone Number   |
 
| Organization  |                         |                     |                |
+---------------+-------------------------+---------------------+----------------+
|   [facility]  |   [address]  | [place] [address] |   [phone] |
| LABORATORY    | [address]                   |                     |                |
+---------------+-------------------------+---------------------+----------------+
 Comprehensive metabolic panel (2019  5:39 PM)Only the most recent of 5 results within
 the time period is included.
 
+----------------+--------------------------+-------------------+-----------------+
| Component      | Value                    | Ref Range         | Performed At    |
+----------------+--------------------------+-------------------+-----------------+
| SODIUM         | 143                      | 135 - 145 mmol/L  | [facility] |
+----------------+--------------------------+-------------------+-----------------+
| POTASSIUM      | 4.7                      | 3.5 - 4.9 mmol/L  | [facility] |
+----------------+--------------------------+-------------------+-----------------+
| CHLORIDE       | 96 (L)                   | 99 - 109 mmol/L   | [facility] |
+----------------+--------------------------+-------------------+-----------------+
| CO2            | >40 (HH)Comment: CALLED  | 23 - 32 mmol/L    | [facility] |
|                | NURSING UNITREAD BACK    |                   |                 |
|                | RESULTS VERIFIEDCALLED   |                   |                 |
|                | TO RN IN ED AT 1830 BY   |                   |                 |
|                | [facility]                      |                   |                 |
|                |                          |                   |                 |
+----------------+--------------------------+-------------------+-----------------+
| ANION GAP AGAP | UNABLE TO CALCULATE      | 5 - 20 mmol/L     | [facility] |
+----------------+--------------------------+-------------------+-----------------+
| GLUCOSE        | 160 (H)                  | 65 - 99 mg/dL     | [facility] |
+----------------+--------------------------+-------------------+-----------------+
| BUN            | 9                        | 8 - 25 mg/dL      | [facility] |
+----------------+--------------------------+-------------------+-----------------+
| CREATININE     | 2.96 (H)                 | 0.50 - 1.00 mg/dL | [facility] |
+----------------+--------------------------+-------------------+-----------------+
| BUN/CREAT      | 3                        |                   | [facility] |
+----------------+--------------------------+-------------------+-----------------+
| CALCIUM        | 9.4                      | 8.5 - 10.5 mg/dL  | [facility] |
+----------------+--------------------------+-------------------+-----------------+
| TOTAL PROTEIN  | 6.7                      | 6.3 - 8.2 g/dL    | [facility] |
+----------------+--------------------------+-------------------+-----------------+
| Albumin        | 4.3                      | 3.3 - 4.8 g/dL    | [facility] |
+----------------+--------------------------+-------------------+-----------------+
| GLOBULIN       | 2.4                      | 1.3 - 4.9 g/dL    | [facility] |
+----------------+--------------------------+-------------------+-----------------+
| A/G            | 1.8                      | 1.0 - 2.4         | [facility] |
+----------------+--------------------------+-------------------+-----------------+
| TBIL           | 0.5                      | 0.1 - 1.5 mg/dL   | [facility] |
+----------------+--------------------------+-------------------+-----------------+
| ALK PHOS       | 103                      | 35 - 115 U/L      | [facility] |
+----------------+--------------------------+-------------------+-----------------+
| AST            | 54 (H)                   | 10 - 45 U/L       | [facility] |
+----------------+--------------------------+-------------------+-----------------+
| ALT            | 40                       | 10 - 65 U/L       | [facility] |
+----------------+--------------------------+-------------------+-----------------+
| EGFR           | 16 (L)Comment: GFR <60:  | >60 mL/min/1.73m2 | [facility] |
|                | CHRONIC KIDNEY DISEASE,  |                   |                 |
|                | IF FOUND OVER A 3 MONTH  |                   |                 |
|                | PERIOD.GFR <15: KIDNEY   |                   |                 |
|                | FAILURE.FOR       |                   |                 |
|                | AMERICANS, MULTIPLY THE  |                   |                 |
|                | CALCULATED GFR BY        |                   |                 |
|                | 1.210.This eGFR is       |                   |                 |
 
|                | calculated using the     |                   |                 |
|                | MDRD [facility] traceable      |                   |                 |
|                | equation.Testing         |                   |                 |
|                | performed at [facility]     |                   |                 |
|                | [address];[place], [place]   |                   |                 |
|                | [address]                    |                   |                 |
+----------------+--------------------------+-------------------+-----------------+
 
 
 
+----------+
| Specimen |
+----------+
| Blood    |
+----------+
 
 
 
+-------------------+------------------+--------------------+--------------+
| Performing        | Address          | City/State/Zipcode | Phone Number |
| Organization      |                  |                    |              |
+-------------------+------------------+--------------------+--------------+
|   [facility] |   [address] | [place] [address] |              |
+-------------------+------------------+--------------------+--------------+
 XR Toe Right 2 View (2019  5:08 PM)
 
+----------------------------------------------------------------------+--------------+
| Impressions                                                          | Performed At |
+----------------------------------------------------------------------+--------------+
|   No radiographic evidence of osteomyelitis seen.    If further      |   [facility]     |
| assessment  is warranted, consider correlation with MRI.  Potential  | RADIOLOGY    |
| acute fracture through the base of the distal phalanx.  Signed by:   |              |
| Gavin South  Sign Date/Time: 2019 5:15 PM                      |              |
+----------------------------------------------------------------------+--------------+
 
 
 
+------------------------------------------------------------------------+--------------+
| Narrative                                                              | Performed At |
+------------------------------------------------------------------------+--------------+
|   RIGHT TOE  CLINICAL INFORMATION:  Other (see comments) Pain, concern |   [facility]     |
|  for osteomyelitis.  COMPARISON:  XR FOOT LEFT (2018); XR FOOT   | RADIOLOGY    |
| LEFT (2018); XR FOOT LEFT  (2018);  FINDINGS:  Advanced     |              |
| degenerative changes identified involving the interphalangeal  joint   |              |
| of the 1st digit.    On the lateral view, there appears to be  lucency |              |
|  through the base of the phalanx, potentially reflecting an  acute     |              |
| fracture.    No erosive or destructive changes appreciated to  suggest |              |
|  osteomyelitis.                                                        |              |
+------------------------------------------------------------------------+--------------+
 
 
 
+------------------------------------------------------------------------+
| Procedure Note                                                         |
+------------------------------------------------------------------------+
|   Denny, Rad Results In - 2019  5:18 PM PST  RIGHT TOE             |
| CLINICAL INFORMATION:                                                  |
| Other (see comments) Pain, concern for osteomyelitis.                  |
| COMPARISON:                                                            |
| XR FOOT LEFT (2018); XR FOOT LEFT (2018); XR FOOT LEFT     |
 
| (2018);                                                           |
| FINDINGS:                                                              |
| Advanced degenerative changes identified involving the interphalangeal |
| joint of the 1st digit.  On the lateral view, there appears to be      |
| lucency through the base of the phalanx, potentially reflecting an     |
| acute fracture.  No erosive or destructive changes appreciated to      |
| suggest osteomyelitis.                                                 |
| IMPRESSION:                                                            |
| No radiographic evidence of osteomyelitis seen.  If further assessment |
| is warranted, consider correlation with MRI.                           |
| Potential acute fracture through the base of the distal phalanx.       |
| Signed by: Habbu, Gavin                                                 |
| Sign Date/Time: 2019 5:15 PM                                     |
+------------------------------------------------------------------------+
 
 
 
+--------------------+------------------+--------------------+--------------+
| Performing         | Address          | City/State/Zipcode | Phone Number |
| Organization       |                  |                    |              |
+--------------------+------------------+--------------------+--------------+
|   [facility] |   [address] | [place] [address] |              |
+--------------------+------------------+--------------------+--------------+
 Blood Culture Set 1 (2019  4:55 PM)Only the most recent of 2 results within the time 
period is included.
 
+----------------------+------------------+-----------+-----------------+
| Component            | Value            | Ref Range | Performed At    |
+----------------------+------------------+-----------+-----------------+
| Specimen Description | BLOOD, LINE DRAW |           | [facility]      |
|                      |                  |           | LABORATORY      |
+----------------------+------------------+-----------+-----------------+
| SPECIAL REQUESTS     | R FOREARM        |           | [facility] |
+----------------------+------------------+-----------+-----------------+
| CULTURE              | NO GROWTH 6 DAYS |           | [facility]      |
|                      |                  |           | LABORATORY      |
+----------------------+------------------+-----------+-----------------+
 
 
 
+---------------------+
| Specimen            |
+---------------------+
| Blood - Blood Line  |
| Draw                |
+---------------------+
 
 
 
+-------------------+-------------------------+---------------------+----------------+
| Performing        | Address                 | City/State/Zipcode  | Phone Number   |
| Organization      |                         |                     |                |
+-------------------+-------------------------+---------------------+----------------+
|   [facility]      |   [address] [place] [address]  | [place] [address] |   [phone] |
| LABORATORY        | [address]vd[address]                   |                     |                |
+-------------------+-------------------------+---------------------+----------------+
|   [facility] |   [address]        | [place] [address]  |                |
+-------------------+-------------------------+---------------------+----------------+
 ED INFORMATION EXCHANGE (2019  3:21 PM)Only the most recent of 4 results within the  period is included.
 
 
+------------------------------------------------------------------------+--------------+
| Narrative                                                              | Performed At |
+------------------------------------------------------------------------+--------------+
|   [id number]     Criteria Met         Care        |   ED         |
| Guidelines      10 in      Security and Safety  No recent Security   | INFORMATION  |
| Events currently on file     ED Care Guidelines from [facility] -        | EXCHANGE     |
| [place]  Last Updated: 18 10:16 AM         Other Information:    |              |
| Currently being seen by [facility] in [place] for mental health        |              |
| [phone]  These are guidelines and the provider should    |              |
| exercise clinical judgment when providing care.  ED Care Guidelines    |              |
| from [facility]  Last Updated: 17 10:44 AM         Care |              |
|  Coordination:  This patient has been identified as having at          |              |
| leastEmergency Departments visits in the 12 months immediately         |              |
| preceding the date these guidelines were entered.Patient requires      |              |
| education on appropriate ED usage.Emphasize the importance of using    |              |
| outpatient   medical services for the treatment of chronic conditions. |              |
|   Please contact Community Health WorkerWillard at [phone] if   |              |
| patient is seen in ED.  These are guidelines and the provider should   |              |
| exercise clinical judgment when providing care.  These are guidelines  |              |
| and the provider should exercise clinical judgment when providing      |              |
| care.           Prescription Drug Report (12 Mo.)  PDMP query found no |              |
|  report.        E.D. Visit Count (12 mo.)  Facility Visits Low Acuity  |              |
|   [facility] 18 0   [facility] 2 0   [facility]   |              |
| [facility] 5 0   Total 25 0   Note: Visits indicate total |              |
|  known visits. Medicaid Low Acuity Dx are the number of primary        |              |
| diagnoses on the Medicaid's Low Acuity dx list.         Recent         |              |
| Emergency Department Visit Summary  Showing 10 most recent visits out  |              |
| of 25 in the past 12 months  Date Facility City State Type Diagnoses   |              |
| or Chief Complaint   2019 [facility] [place]       |              |
| Emergency       2019 [facility] [place]            |              |
| Emergency          Multiple Falls        Referral        Circulatory   |              |
| Problem      2019 [facility] [place] Emergency      |              |
|      ABD PAIN        Other chest pain      2019 [facility]         |              |
| Critical access hospital [place] Emergency          Foot Pain      2019 [facility]              [facility]          Chest Pain          |              |
| Nausea        Shortness of Breath        Chest pain, unspecified       |              |
|      Elevated blood-pressure reading, without diagnosis of             |              |
| hypertension        Presence of aortocoronary bypass graft             |              |
| Other specified postprocedural states      2019 [facility]  |              |
| [facility] [place] Emergency          CHEST PAIN        Abnormal levels  |              |
| of other serum enzymes        Personal history of other diseases of    |              |
| the circulatory system        Chest pain, unspecified      2019 |              |
|  [facility] [place] Emergency          FISTULA BLEEDING    |              |
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
| grafts, initial encounter      Dec 22, 2018 [facility]       |              |
| [place] Emergency          CHEST PAIN        Type 2 diabetes         |              |
| mellitus with hyperglycemia        Chest pain, unspecified      Nov    |              |
| 2018 [facility] [place] Emergency    Chief Complaint:   |              |
| SOB      2018 [facility] [place] Emergency         |              |
|     FALL        Unspecified fall, initial encounter        Dependence  |              |
| on renal dialysis        End stage renal disease            Recent     |              |
| Inpatient Visit Summary  Showing 10 most recent visits out of 11 in    |              |
| the past 12 months  Date Facility City State Type Diagnoses or Chief   |              |
| Complaint   2019 [facility] [place] Vascular       |              |
| Surgery          Foot Pain        End stage renal disease              |              |
| Dependence on renal dialysis        Peripheral vascular disease,       |              |
| unspecified        Anemia in chronic kidney disease        Other       |              |
| disorders of plasma-protein metabolism, not elsewhere classified       |              |
|      Other specified personal risk factors, not elsewhere classified   |              |
 
|     Dec 27, 2018 [facility] [place] Recovery               |              |
|     Peripheral arterial disease, osteomyelitis left foot        Other  |              |
| acute osteomyelitis, left ankle and foot        Long term (current)    |              |
| use of antibiotics        End stage renal disease        Anemia in     |              |
| chronic kidney disease      Dec 5, 2018 [facility] [place] |              |
|  General Medicine          Peripheral vascular disease, unspecified    |              |
|      End stage renal disease        Dependence on renal dialysis       |              |
|      Long term (current) use of insulin        Other chronic           |              |
| osteomyelitis, left ankle and foot        Type 2 diabetes mellitus     |              |
| with diabetic chronic kidney disease        Essential (primary)        |              |
| hypertension      2018 [facility]. [place]          |              |
| Inpatient          Upper GIB        Other chronic osteomyelitis,       |              |
| unspecified ankle and foot        End stage renal disease        Other |              |
|  specified personal risk factors, not elsewhere classified             |              |
| Chronic systolic (congestive) heart failure        Anemia in chronic   |              |
| kidney disease        Other disorders of plasma-protein metabolism,    |              |
| not elsewhere classified        Moderate protein-calorie malnutrition  |              |
|        Other disorders of phosphorus metabolism      2018      |              |
[facility] [place] [place] [facility]          1. Type 2      |              |
| diabetes mellitus with other specified complication        2. Urinary  |              |
| tract infection, site not specified        3. Unspecified              |              |
| protein-calorie malnutrition        4. Other chronic osteomyelitis,    |              |
| unspecified site        5. Hypertensive heart and chronic kidney       |              |
| disease with heart failure and with stage 5 chronic kidney disease, or |              |
|  end stage renal disease        6. Chronic diastolic (congestive)      |              |
| heart failure        7. Other osteomyelitis, ankle and foot        8.  |              |
| Type 2 diabetes mellitus with foot ulcer        9. Atherosclerotic     |              |
| heart disease of native coronary artery without angina pectoris        |              |
|  10. Chronic obstructive pulmonary disease, unspecified      ,    |              |
|  [facility] [place] Medical Surgical          1. Benign |              |
|  paroxysmal vertigo, unspecified ear        2. End stage renal disease |              |
|         3. Hypertensive chronic kidney disease with stage 5 chronic    |              |
| kidney disease or end stage renal disease        4. Urinary tract      |              |
| infection, site not specified        5. Hypertensive heart and chronic |              |
|  kidney disease with heart failure and with stage 5 chronic kidney     |              |
| disease, or end stage renal disease        6. Kidney transplant status |              |
|         7. Unspecified systolic (congestive) heart failure        8.   |              |
| Syncope and collapse        9. Type 2 diabetes mellitus with diabetic  |              |
| chronic kidney disease        10. Atherosclerotic heart disease of     |              |
| native coronary artery without angina pectoris      Sep 15, 2018       |              |
| [facility] [place] Medical Surgical          Acute     |              |
| ischemic heart disease, unspecified        Long term (current) use of  |              |
| insulin        Dependence on renal dialysis        End stage renal     |              |
| disease        Type 2 diabetes mellitus with diabetic chronic kidney   |              |
| disease        Essential (primary) hypertension        Anemia in       |              |
| chronic kidney disease      2018 [facility] |              |
|  Medical Surgical          0. Cough        1. Pneumonia due to         |              |
| Pseudomonas        2. End stage renal disease        3. Hypertensive   |              |
| heart and chronic kidney disease with heart failure and with stage 5   |              |
| chronic kidney disease, or end stage renal disease        4. Cachexia  |              |
|        5. Chronic obstructive pulmonary disease with acute lower       |              |
| respiratory infection        6. Type 2 diabetes mellitus with diabetic |              |
|  chronic kidney disease        7. Heart failure, unspecified        8. |              |
|  Hyperlipidemia, unspecified        9. Gastro-esophageal reflux        |              |
| disease without esophagitis      2018 [facility].       |              |
| [facility] Medical Surgical          0. Other chest pain        1.      |              |
| Gastro-esophageal reflux disease without esophagitis        2. End     |              |
| stage renal disease        3. Hypertensive heart and chronic kidney    |              |
| disease with heart failure and with stage 5 chronic kidney disease, or |              |
|  end stage renal disease        4. Chronic systolic (congestive) heart |              |
 
|  failure        5. Atherosclerotic heart disease of native coronary    |              |
| artery with other forms of angina pectoris        6. Type 2 diabetes   |              |
| mellitus with diabetic chronic kidney disease        7.                |              |
| Hyperlipidemia, unspecified        8. Major depressive disorder,       |              |
| single episode, unspecified        9. Chronic obstructive pulmonary    |              |
| disease, unspecified      Mar 6, 2018 [facility]     |              |
| [facility] Cardiology          Heart Failure        Need for iv access  |              |
|        Type 2 diabetes mellitus with other specified complication      |              |
|      Long term (current) use of insulin        Paroxysmal atrial       |              |
| fibrillation        Anemia in chronic kidney disease                   |              |
| Atherosclerotic heart disease of native coronary artery without angina |              |
|  pectoris        Cardiomyopathy, unspecified        End stage renal    |              |
| disease        Other specified postprocedural states            Care   |              |
| Providers  Provider PRC Type Phone Fax Service Dates   HEADINGS, SANTIAGO, |              |
|  F.N.P. Nurse Practitioner: Family     Current      Marco Antonio Martinez     |              |
| /Care Coordinator [phone]    2019 -          |              |
| Current      [facility] Primary Care [phone]    2019 |              |
|  - Current      [facility] Primary            |              |
| Care    [phone] Current      [facility]              |
| [place] Primary Care     Current      MANOLO GONZALEZ Primary Care         |              |
| Current      [facility] Mental [facility] Provider [phone] |              |
|  [phone] Current      [facility] Case or Care Manager     Current      |              |
|  MIRTA Goel Case or Care Manager [phone]  |              |
| [phone] Current      Jairo Gutierrez MD Other     Current     |              |
|      [employer]  This patient has registered at the [facility]      |              |
| [facility] Emergency Department   For more information    |              |
| visit:                                                                 |              |
| [email or web address] |              |
| [id number]   The above information is provided for the sole purpose of     |              |
| patient treatment. Use of this information beyond the terms of Data    |              |
| Sharing Memorandum of Understanding and License Agreement is           |              |
| prohibited. In certain cases not all visits may be represented.        |              |
| Consult the aforementioned facilities for additional information.      |              |
| 2019 [employer] - [place], [place] -      |              |
| [email or web address]                                         |              |
+------------------------------------------------------------------------+--------------+
 
 
 
+-------------------------------------------------------------------------------------------
--------------------------------------------------------------------------------------------
--------------------+
| Procedure Note                                                                            
                                                                                            
                    |
+-------------------------------------------------------------------------------------------
--------------------------------------------------------------------------------------------
--------------------+
|   Interface, Lab - 2019  3:22 PM PST  Formatting of this note may be different      
                                                                                            
                    |
| from the original.[id number] [facility]  Care Guidelines  10 in     
                                                                                            
                    |
| 12Security and SafetyNo recent Security Events currently on fileED Care Guidelines from   
                                                                                            
                    |
| [facility] Updated: 18 10:16 AM  Other Information:Currently being      
                                                                                            
                    |
 
| seen by [facility] in [place] for mental health concerns.[phone] are            
                                                                                            
                    |
| guidelines and the provider should exercise clinical judgment when providing care.ED      
                                                                                            
                    |
| Care Guidelines from [facility] Updated: 17 10:44 AM  Care             
                                                                                            
                    |
| Coordination:This patient has been identified as having at leastEmergency Departments     
                                                                                            
                    |
| visits in the 12 months immediately preceding the date these guidelines were              
                                                                                            
                    |
| entered.Patient requires education on appropriate ED usage.Emphasize the importance of    
                                                                                            
                    |
| using outpatient medical services for the treatment of chronic conditions.Please contact  
                                                                                            
                    |
|  Community Health WorkerWillard at [phone] if patient is seen in ED.These are      
                                                                                            
                    |
| guidelines and the provider should exercise clinical judgment when providing care.These   
                                                                                            
                    |
| are guidelines and the provider should exercise clinical judgment when providing          
                                                                                            
                    |
| care.Prescription Drug Report (12 Mo.)PDMP query found no report.E.D. Visit Count (12     
                                                                                            
                    |
| mo.)Facility Visits Low Acuity [facility] 18 0 [facility] 2 0    
                                                                                            
                    |
| [facility] 5 0 Total 25 0 Note: Visits indicate total known visits.   
                                                                                            
                    |
| Medicaid Low Acuity Dx are the number of primary diagnoses on the Medicaid's Low Acuity   
                                                                                            
                    |
| dx list.  Recent Emergency Department Visit SummaryShowing 10 most recent visits out of   
                                                                                            
                    |
| 25 in the past 12 monthsDate Facility City State Type Diagnoses or Chief Complaint Feb    
                                                                                            
                    |
| 2019 [facility] [place] Emergency   2019 [facility]     
                                                                                            
                    |
| [facility] [place] Emergency    Multiple Falls    Referral    Circulatory Problem  2019     
                                                                                            
                    |
| [facility] [place] Emergency    ABD PAIN    Other chest pain  2019    
                                                                                            
                    |
| [facility] [place] Emergency    Foot Pain  2019 [facility]  
                                                                                            
                    |
 
|  [facility] [place] Emergency    Chest Pain    Nausea    Shortness of Breath    Chest pain,        
                                                                                            
                    |
| unspecified    Elevated blood-pressure reading, without diagnosis of hypertension         
                                                                                            
                    |
| Presence of aortocoronary bypass graft    Other specified postprocedural states    
                                                                                            
                    |
|   [facility] [place] Emergency    CHEST PAIN    Abnormal levels of other  
                                                                                            
                    |
|  serum enzymes    Personal history of other diseases of the circulatory system    Chest   
                                                                                            
                    |
| pain, unspecified  2019 [facility] [place] Emergency    FISTULA        
                                                                                            
                    |
| BLEEDING    Hemorrhage due to vascular prosthetic devices, implants and grafts, initial   
                                                                                            
                    |
| encounter  Dec 22, 2018 [facility] [place] Emergency    CHEST PAIN    Type 2  
                                                                                            
                    |
|  diabetes mellitus with hyperglycemia    Chest pain, unspecified  2018 [facility]      
                                                                                            
                    |
| [facility] [place] [facility]  Chief Complaint: SOB  2018 [facility]       
                                                                                            
                    |
| [facility] [place] Emergency    FALL    Unspecified fall, initial encounter    Dependence   
                                                                                            
                    |
| on renal dialysis    End stage renal disease  Recent Inpatient Visit SummaryShowing 10    
                                                                                            
                    |
| most recent visits out of 11 in the past 12 monthsDate Facility City State Type           
                                                                                            
                    |
| Diagnoses or Chief Complaint 2019 [facility] [place] Vascular         
                                                                                            
                    |
| Surgery    Foot Pain    End stage renal disease    Dependence on renal dialysis           
                                                                                            
                    |
| Peripheral vascular disease, unspecified    Anemia in chronic kidney disease    Other     
                                                                                            
                    |
| disorders of plasma-protein metabolism, not elsewhere classified    Other specified       
                                                                                            
                    |
| personal risk factors, not elsewhere classified  Dec 27, 2018 [facility] JANE[facility]        
                                                                                            
                    |
| [place] Recovery    Peripheral arterial disease, osteomyelitis left foot    Other       
                                                                                            
                    |
| acute osteomyelitis, left ankle and foot    Long term (current) use of antibiotics        
                                                                                            
                    |
 
| End stage renal disease    Anemia in chronic kidney disease  Dec 5, 2018 [facility]  
                                                                                            
                    |
|  [place] General Medicine    Peripheral vascular disease, unspecified    End       
                                                                                            
                    |
| stage renal disease    Dependence on renal dialysis    Long term (current) use of         
                                                                                            
                    |
| insulin    Other chronic osteomyelitis, left ankle and foot    Type 2 diabetes mellitus   
                                                                                            
                    |
| with diabetic chronic kidney disease    Essential (primary) hypertension  2018    
                                                                                            
                    |
| [facility] [place] Inpatient    Upper GIB    Other chronic osteomyelitis,     
                                                                                            
                    |
| unspecified ankle and foot    End stage renal disease    Other specified personal risk    
                                                                                            
                    |
| factors, not elsewhere classified    Chronic systolic (congestive) heart failure          
                                                                                            
                    |
| Anemia in chronic kidney disease    Other disorders of plasma-protein metabolism, not     
                                                                                            
                    |
| elsewhere classified    Moderate protein-calorie malnutrition    Other disorders of       
                                                                                            
                    |
| phosphorus metabolism  2018 [facility] [place] [facility]    1.  
                                                                                            
                    |
|  Type 2 diabetes mellitus with other specified complication    2. Urinary tract           
                                                                                            
                    |
| infection, site not specified    3. Unspecified protein-calorie malnutrition    4. Other  
                                                                                            
                    |
|  chronic osteomyelitis, unspecified site    5. Hypertensive heart and chronic kidney      
                                                                                            
                    |
| disease with heart failure and with stage 5 chronic kidney disease, or end stage renal    
                                                                                            
                    |
| disease    6. Chronic diastolic (congestive) heart failure    7. Other osteomyelitis,     
                                                                                            
                    |
| ankle and foot    8. Type 2 diabetes mellitus with foot ulcer    9. Atherosclerotic       
                                                                                            
                    |
| heart disease of native coronary artery without angina pectoris    10. Chronic            
                                                                                            
                    |
| obstructive pulmonary disease, unspecified  2018 [facility] [place] [place]     
                                                                                            
                    |
| Medical Surgical    1. Benign paroxysmal vertigo, unspecified ear    2. End stage renal   
                                                                                            
                    |
 
| disease    3. Hypertensive chronic kidney disease with stage 5 chronic kidney disease or  
                                                                                            
                    |
|  end stage renal disease    4. Urinary tract infection, site not specified    5.          
                                                                                            
                    |
| Hypertensive heart and chronic kidney disease with heart failure and with stage 5         
                                                                                            
                    |
| chronic kidney disease, or end stage renal disease    6. Kidney transplant status    7.   
                                                                                            
                    |
| Unspecified systolic (congestive) heart failure    8. Syncope and collapse    9. Type 2   
                                                                                            
                    |
| diabetes mellitus with diabetic chronic kidney disease    10. Atherosclerotic heart       
                                                                                            
                    |
| disease of native coronary artery without angina pectoris  Sep 15, 2018 [facility]    
                                                                                            
                    |
| [place] Medical Surgical    Acute ischemic heart disease, unspecified         
                                                                                            
                    |
| Long term (current) use of insulin    Dependence on renal dialysis    End stage renal     
                                                                                            
                    |
| disease    Type 2 diabetes mellitus with diabetic chronic kidney disease    Essential     
                                                                                            
                    |
| (primary) hypertension    Anemia in chronic kidney disease  2018 [facility]            
                                                                                            
                    |
| [facility] [place] [facility]    0. Cough    1. Pneumonia due to Pseudomonas   
                                                                                            
                    |
|    2. End stage renal disease    3. Hypertensive heart and chronic kidney disease with    
                                                                                            
                    |
| heart failure and with stage 5 chronic kidney disease, or end stage renal disease    4.   
                                                                                            
                    |
| Cachexia    5. Chronic obstructive pulmonary disease with acute lower respiratory         
                                                                                            
                    |
| infection    6. Type 2 diabetes mellitus with diabetic chronic kidney disease    7.       
                                                                                            
                    |
| Heart failure, unspecified    8. Hyperlipidemia, unspecified    9. Gastro-esophageal      
                                                                                            
                    |
| reflux disease without esophagitis  [facility] [place] [facility]     
                                                                                            
                    |
| Surgical    0. Other chest pain    1. Gastro-esophageal reflux disease without            
                                                                                            
                    |
| esophagitis    2. End stage renal disease    3. Hypertensive heart and chronic kidney     
                                                                                            
                    |
 
| disease with heart failure and with stage 5 chronic kidney disease, or end stage renal    
                                                                                            
                    |
| disease    4. Chronic systolic (congestive) heart failure    5. Atherosclerotic heart     
                                                                                            
                    |
| disease of native coronary artery with other forms of angina pectoris    6. Type 2        
                                                                                            
                    |
| diabetes mellitus with diabetic chronic kidney disease    7. Hyperlipidemia, unspecified  
                                                                                            
                    |
|     8. Major depressive disorder, single episode, unspecified    9. Chronic obstructive   
                                                                                            
                    |
| pulmonary disease, unspecified  Mar 6, 2018 [facility] [place]. [place]        
                                                                                            
                    |
| Cardiology    Heart Failure    Need for iv access    Type 2 diabetes mellitus with other  
                                                                                            
                    |
|  specified complication    Long term (current) use of insulin    Paroxysmal atrial        
                                                                                            
                    |
| fibrillation    Anemia in chronic kidney disease    Atherosclerotic heart disease of      
                                                                                            
                    |
| native coronary artery without angina pectoris    Cardiomyopathy, unspecified    End      
                                                                                            
                    |
| stage renal disease    Other specified postprocedural states  Care [facility]  
                                                                                            
                    |
|  Type Phone Fax Service Dates HEADINGS, ELZA HENDERSON Nurse Practitioner: Family           
                                                                                            
                    |
| Current  Marco Antonio Martinez /Care Coordinator [phone]  2019 -       
                                                                                            
                    |
| Current  Marco Antonio Martinez Primary Care [phone]  2019 - Current  [facility]        
                                                                                            
                    |
| [facility] Primary Care  [phone] Current  [facility]  
                                                                                            
                    |
|  [facility] Primary Care   Current  MANOLO GONZALEZ Primary Care   [facility]  [facility]    
                                                                                            
                    |
| [facility] Mental Health Provider [phone] Current  [facility] Case or Care  
                                                                                            
                    |
|  Manager   Current  MIRTA Goel Case or Care Manager [phone]  
                                                                                            
                    |
|  [phone] Current  Jairo Gutierrez MD Other   Current  [employer]      
                                                                                            
                    |
| patient has registered at the [facility] Emergency Department For     
                                                                                            
                    |
 
| more information visit:                                                                   
                                                                                            
                    |
| [email or web address] The above    
                                                                                            
                    |
| information is provided for the sole purpose of patient treatment. Use of this            
                                                                                            
                    |
| information beyond the terms of Data Sharing Memorandum of Understanding and License      
                                                                                            
                    |
| Agreement is prohibited. In certain cases not all visits may be represented. Consult the  
                                                                                            
                    |
|  aforementioned facilities for additional information. 2019 [facility]            
                                                                                            
                    |
| [employer] [facility] [place], [place] - [email or web address]                  
                                                                                            
                    |
|   End stage renal disease                                                                 
                                                                                            
                    |
|   Dependence on renal dialysis                                                            
                                                                                            
                    |
|   Long term (current) use of insulin                                                      
                                                                                            
                    |
|   Other chronic osteomyelitis, left ankle and foot                                        
                                                                                            
                    |
|   Type 2 diabetes mellitus with diabetic chronic kidney disease                           
                                                                                            
                    |
|   Essential (primary) hypertension                                                        
                                                                                            
                    |
|                                                                                           
                                                                                            
                    |
|[facility] [place] [facility]                                      
                                                                                            
                    |
|  Upper GIB                                                                                
                                                                                            
                    |
|   Other chronic osteomyelitis, unspecified ankle and foot                                 
                                                                                            
                    |
|   End stage renal disease                                                                 
                                                                                            
                    |
|   Other specified personal risk factors, not elsewhere classified                         
                                                                                            
                    |
|   Chronic systolic (congestive) heart failure                                             
                                                                                            
                    |
 
|   Anemia in chronic kidney disease                                                        
                                                                                            
                    |
|   Other disorders of plasma-protein metabolism, not elsewhere classified                  
                                                                                            
                    |
|   Moderate protein-calorie malnutrition                                                   
                                                                                            
                    |
|   Other disorders of phosphorus metabolism                                                
                                                                                            
                    |
|                                                                                           
                                                                                            
                    |
|2018 [facility] [place] [place] [facility]                                
                                                                                            
                    |
|  1. Type 2 diabetes mellitus with other specified complication                            
                                                                                            
                    |
|   2. Urinary tract infection, site not specified                                          
                                                                                            
                    |
|   3. Unspecified protein-calorie malnutrition                                             
                                                                                            
                    |
|   4. Other chronic osteomyelitis, unspecified site                                        
                                                                                            
                    |
|   5. 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
onic kidney disease, or end stage renal disease                                             
                    |
|   6. Chronic diastolic (congestive) heart failure                                         
                                                                                            
                    |
|   7. Other osteomyelitis, ankle and foot                                                  
                                                                                            
                    |
|   8. Type 2 diabetes mellitus with foot ulcer                                             
                                                                                            
                    |
|   9. Atherosclerotic heart disease of native coronary artery without angina pectoris      
                                                                                            
                    |
|   10. Chronic obstructive pulmonary disease, unspecified                                  
                                                                                            
                    |
|                                                                                           
                                                                                            
                    |
|2018 [facility] Akash Cox. [place] [facility]                                 
                                                                                            
                    |
|  1. Benign paroxysmal vertigo, unspecified ear                                            
                                                                                            
                    |
|   2. End stage renal disease                                                              
                                                                                            
                    |
 
|   3. Hypertensive chronic kidney disease with stage 5 chronic kidney disease or end stage 
renal disease                                                                               
                    |
|   4. Urinary tract infection, site not specified                                          
                                                                                            
                    |
|   5. 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
onic kidney disease, or end stage renal disease                                             
                    |
|   6. Kidney transplant status                                                             
                                                                                            
                    |
|   7. Unspecified systolic (congestive) heart failure                                      
                                                                                            
                    |
|   8. Syncope and collapse                                                                 
                                                                                            
                    |
|   9. Type 2 diabetes mellitus with diabetic chronic kidney disease                        
                                                                                            
                    |
|   10. Atherosclerotic heart disease of native coronary artery without angina pectoris     
                                                                                            
                    |
|                                                                                           
                                                                                            
                    |
|Sep 15, 2018 Located within [facility] JANEEN Vivar. [place] [facility]                           
                                                                                            
                    |
|  Acute ischemic heart disease, unspecified                                                
                                                                                            
                    |
|   Long term (current) use of insulin                                                      
                                                                                            
                    |
|   Dependence on renal dialysis                                                            
                                                                                            
                    |
|   End stage renal disease                                                                 
                                                                                            
                    |
|   Type 2 diabetes mellitus with diabetic chronic kidney disease                           
                                                                                            
                    |
|   Essential (primary) hypertension                                                        
                                                                                            
                    |
|   Anemia in chronic kidney disease                                                        
                                                                                            
                    |
|                                                                                           
                                                                                            
                    |
|2018 [facility]. [place] [facility]                                
                                                                                            
                    |
|  0. Cough                                                                                 
                                                                                            
                    |
 
|   1. Pneumonia due to Pseudomonas                                                         
                                                                                            
                    |
|   2. End stage renal disease                                                              
                                                                                            
                    |
|   3. 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
onic kidney disease, or end stage renal disease                                             
                    |
|   4. Cachexia                                                                             
                                                                                            
                    |
|   5. Chronic obstructive pulmonary disease with acute lower respiratory infection         
                                                                                            
                    |
|   6. Type 2 diabetes mellitus with diabetic chronic kidney disease                        
                                                                                            
                    |
|   7. Heart failure, unspecified                                                           
                                                                                            
                    |
|   8. Hyperlipidemia, unspecified                                                          
                                                                                            
                    |
|   9. Gastro-esophageal reflux disease without esophagitis                                 
                                                                                            
                    |
|                                                                                           
                                                                                            
                    |
|2018 [facility]. [place] [facility]                                 
                                                                                            
                    |
|  0. Other chest pain                                                                      
                                                                                            
                    |
|   1. Gastro-esophageal reflux disease without esophagitis                                 
                                                                                            
                    |
|   2. End stage renal disease                                                              
                                                                                            
                    |
|   3. 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
onic kidney disease, or end stage renal disease                                             
                    |
|   4. Chronic systolic (congestive) heart failure                                          
                                                                                            
                    |
|   5. 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
ctoris                                                                                      
                    |
|   6. Type 2 diabetes mellitus with diabetic chronic kidney disease                        
                                                                                            
                    |
|   7. Hyperlipidemia, unspecified                                                          
                                                                                            
                    |
|   8. Major depressive disorder, single episode, unspecified                               
                                                                                            
                    |
 
|   9. Chronic obstructive pulmonary disease, unspecified                                   
                                                                                            
                    |
|                                                                                           
                                                                                            
                    |
|Mar 6, 2018 [facility]. [place] [facility]                              
                                                                                            
                    |
|  Heart Failure                                                                            
                                                                                            
                    |
|   Need for iv access                                                                      
                                                                                            
                    |
|   Type 2 diabetes mellitus with other specified complication                              
                                                                                            
                    |
|   Long term (current) use of insulin                                                      
                                                                                            
                    |
|   Paroxysmal atrial fibrillation                                                          
                                                                                            
                    |
|   Anemia in chronic kidney disease                                                        
                                                                                            
                    |
|   Atherosclerotic heart disease of native coronary artery without angina pectoris         
                                                                                            
                    |
|   Cardiomyopathy, unspecified                                                             
                                                                                            
                    |
|   End stage renal disease                                                                 
                                                                                            
                    |
|   Other specified postprocedural states                                                   
                                                                                            
                    |
|                                                                                           
                                                                                            
                    |
|                                                                                           
                                                                                            
                    |
|                                                                                           
                                                                                            
                    |
|Care Providers                                                                             
                                                                                            
                    |
|Provider PRC Type Phone Fax Service Dates                                                  
                                                                                            
                    |
|HEADINGS, SANTIAGO, F.N.P. Nurse Practitioner: Family   Current                                
                                                                                            
                    |
|Marco Antonio Martinez /Care Coordinator [phone]  2019 - Current         
                                                                                            
                    |
 
|[facility] Primary Care [phone]  2019 - Current                          
                                                                                            
                    |
|[facility] Primary Care  [phone] Current                   
                                                                                            
                    |
|[facility] Primary Care   Current                                
                                                                                            
                    |
|[facility] GONZALEZ Primary Care   Current                                                        
                                                                                            
                    |
|[facility] Provider [phone] Current                 
                                                                                            
                    |
|[facility] Case or Care Manager   Current                                                   
                                                                                            
                    |
|MIRTA Goel Case or Care Manager [phone] Current
                                                                                            
                    |
|Jairo Gutierrez MD Other   Current                                                       
                                                                                            
                    |
|                                                                                           
                                                                                            
                    |
|[employer]                                                                          
                                                                                            
                    |
|This patient has registered at the [facility] Emergency Department     
                                                                                            
                    |
|For more information visit: [email or web address]
[email or web address]                                                                               
                    |
|The above information is provided for the sole purpose of patient treatment. Use of this in
formation beyond the terms of Data Sharing Memorandum of Understanding and License Agreement
 is prohibited. In  |
|certain cases not all visits may be represented. Consult the aforementioned facilities for 
additional information.                                                                     
                    |
|2019 [employer] - [place], [place] - [email or web address]                                                                                       
                    |
+-------------------------------------------------------------------------------------------
--------------------------------------------------------------------------------------------
--------------------+
 
 
 
+-------------------+---------+--------------------+--------------+
| Performing        | Address | City/State/Zipcode | Phone Number |
| Organization      |         |                    |              |
+-------------------+---------+--------------------+--------------+
|   ED INFORMATION  |         |                    |              |
| EXCHANGE          |         |                    |              |
+-------------------+---------+--------------------+--------------+
 Phosphorus (2019  5:38 AM)
 
 
+------------+--------------------------+-----------------+--------------+
| Component  | Value                    | Ref Range       | Performed At |
+------------+--------------------------+-----------------+--------------+
| PHOSPHORUS | 3.6Comment: Testing      | 2.3 - 4.8 mg/dL | [facility]   |
|            | performed at [facility], [address] |                 | LABORATORY   |
|            |  [address],        |                 |              |
|            | [place]  [id number]     |                 |              |
+------------+--------------------------+-----------------+--------------+
 
 
 
+----------+
| Specimen |
+----------+
| Blood    |
+----------+
 
 
 
+---------------+-------------------------+---------------------+----------------+
| Performing    | Address                 | City/State/Zipcode  | Phone Number   |
| Organization  |                         |                     |                |
+---------------+-------------------------+---------------------+----------------+
|   [facility]  |   [address] [place] [address]  | [place] [address] |   [phone] |
| LABORATORY    | [address]                   |                     |                |
+---------------+-------------------------+---------------------+----------------+
 Magnesium (2019  5:38 AM)
 
+-----------+--------------------------+-----------------+--------------+
| Component | Value                    | Ref Range       | Performed At |
+-----------+--------------------------+-----------------+--------------+
| MAGNESIUM | 2.3Comment: Testing      | 1.7 - 2.4 mg/dL | [facility]   |
|           | performed at [facility], [address] |                 | LABORATORY   |
|           |  [facility]        |                 |              |
|           | [place]  [address]     |                 |              |
+-----------+--------------------------+-----------------+--------------+
 
 
 
+----------+
| Specimen |
+----------+
| Blood    |
+----------+
 
 
 
+---------------+-------------------------+---------------------+----------------+
| Performing    | Address                 | City/State/Zipcode  | Phone Number   |
| Organization  |                         |                     |                |
+---------------+-------------------------+---------------------+----------------+
|   [facility]  |   [address] [place] [address]  | [place] [address] |   [phone] |
| LABORATORY    | [address]vd[address]                   |                     |                |
+---------------+-------------------------+---------------------+----------------+
 Basic metabolic panel (2019  5:38 AM)Only the most recent of 2 results within the  period is included.
 
+----------------+--------------------------+-------------------+--------------+
| Component      | Value                    | Ref Range         | Performed At |
+----------------+--------------------------+-------------------+--------------+
 
| SODIUM         | 139                      | 135 - 145 mmol/L  | [facility]   |
|                |                          |                   | LABORATORY   |
+----------------+--------------------------+-------------------+--------------+
| POTASSIUM      | 3.9                      | 3.5 - 4.9 mmol/L  | [facility]   |
|                |                          |                   | LABORATORY   |
+----------------+--------------------------+-------------------+--------------+
| CHLORIDE       | 97 (L)                   | 99 - 109 mmol/L   | [facility]   |
|                |                          |                   | LABORATORY   |
+----------------+--------------------------+-------------------+--------------+
| CO2            | 31                       | 23 - 32 mmol/L    | [facility]   |
|                |                          |                   | LABORATORY   |
+----------------+--------------------------+-------------------+--------------+
| ANION GAP AGAP | 15                       | 5 - 20 mmol/L     | [facility]   |
|                |                          |                   | LABORATORY   |
+----------------+--------------------------+-------------------+--------------+
| GLUCOSE        | 169 (H)                  | 65 - 99 mg/dL     | [facility]   |
|                |                          |                   | LABORATORY   |
+----------------+--------------------------+-------------------+--------------+
| BUN            | 13                       | 8 - 25 mg/dL      | [facility]   |
|                |                          |                   | LABORATORY   |
+----------------+--------------------------+-------------------+--------------+
| CREATININE     | 4.8 (H)                  | 0.50 - 1.00 mg/dL | [facility]   |
|                |                          |                   | LABORATORY   |
+----------------+--------------------------+-------------------+--------------+
| BUN/CREAT      | 3                        |                   | [facility]   |
|                |                          |                   | LABORATORY   |
+----------------+--------------------------+-------------------+--------------+
| CALCIUM        | 9.4                      | 8.5 - 10.5 mg/dL  | [facility]   |
|                |                          |                   | LABORATORY   |
+----------------+--------------------------+-------------------+--------------+
| EGFR           | 9 (L)Comment: GFR <60:   | >60 mL/min/1.73m2 | [facility]   |
|                | CHRONIC KIDNEY DISEASE,  |                   | LABORATORY   |
|                | IF FOUND OVER A 3 MONTH  |                   |              |
|                | PERIOD.GFR <15: KIDNEY   |                   |              |
|                | FAILURE.FOR       |                   |              |
|                | AMERIC[employer], MULTIPLY THE  |                   |              |
|                | CALCULATED GFR BY        |                   |              |
|                | 1.210.This eGFR is       |                   |              |
|                | calculated using the     |                   |              |
|                | MDRD IDMS traceable      |                   |              |
|                | equation.Testing         |                   |              |
|                | performed at [facility], [address]                   |              |
|                |  [facility],        |                   |              |
|                | [place]    [address]   |                   |              |
+----------------+--------------------------+-------------------+--------------+
 
 
 
+----------+
| Specimen |
+----------+
| Blood    |
+----------+
 
 
 
+---------------+-------------------------+---------------------+----------------+
| Performing    | Address                 | City/State/Zipcode  | Phone Number   |
| Organization  |                         |                     |                |
+---------------+-------------------------+---------------------+----------------+
 
|   [facility]  |   [address]  | [place] [address] |   [phone] |
| LABORATORY    | [address]                   |                     |                |
+---------------+-------------------------+---------------------+----------------+
 EKG STANDARD 12 LEAD (2019  6:18 AM)Only the most recent of 3 results within the time
 period is included.
 
+-------------------+--------------------------+-----------+--------------+
| Component         | Value                    | Ref Range | Performed At |
+-------------------+--------------------------+-----------+--------------+
| Ventricular Rate  | 73                       | BPM       | [facility]     |
+-------------------+--------------------------+-----------+--------------+
| Atrial Rate       | 73                       | BPM       | [facility]     |
+-------------------+--------------------------+-----------+--------------+
| P-R Interval      | 146                      | ms        | [facility]     |
+-------------------+--------------------------+-----------+--------------+
| QRS Duration      | 128                      | ms        | [facility]     |
+-------------------+--------------------------+-----------+--------------+
| Q-T Interval      | 464                      | ms        | [facility]     |
+-------------------+--------------------------+-----------+--------------+
| QTC Calculation   | 511                      | ms        | [facility]     |
| (Bezet)           |                          |           |              |
+-------------------+--------------------------+-----------+--------------+
| Calculated P Axis | 70                       | degrees   | [facility]     |
+-------------------+--------------------------+-----------+--------------+
| Calculated R Axis | -38                      | degrees   | [facility]     |
+-------------------+--------------------------+-----------+--------------+
| Calculated T Axis | 9                        | degrees   | [facility]     |
+-------------------+--------------------------+-----------+--------------+
| Diagnosis         | Normal sinus             |           | [facility]     |
|                   | rhythmPossible Left      |           |              |
|                   | atrial enlargementLeft   |           |              |
|                   | axis                     |           |              |
|                   | deviationNon-specific    |           |              |
|                   | intra-ventricular        |           |              |
|                   | conduction blockCannot   |           |              |
|                   | rule out Septal infarct  |           |              |
|                   | , age                    |           |              |
|                   | undeterminedAbnormal     |           |              |
|                   | ECGWhen compared with    |           |              |
|                   | ECG of 2019       |           |              |
|                   | 05:50,Minimal criteria   |           |              |
|                   | for Septal infarct are   |           |              |
|                   | now PresentConfirmed by  |           |              |
|                   | EN FRANK ([id number]) on   |           |              |
|                   | 2019 12:03:10 PM    |           |              |
+-------------------+--------------------------+-----------+--------------+
 
 
 
+--------------+-------------------+--------------------+--------------+
| Performing   | Address           | City/State/Zipcode | Phone Number |
| Organization |                   |                    |              |
+--------------+-------------------+--------------------+--------------+
|   [facility]   |   [address] | [place] [address] |              |
+--------------+-------------------+--------------------+--------------+
 IR stent femoral popliteal (2019  6:45 PM)
 
+------------------------------------------------------------------------+--------------+
| Impressions                                                            | Performed At |
+------------------------------------------------------------------------+--------------+
 
|      1. Aorta and iliac arteries were calcified but without            |   [facility]     |
| significant stenosis.  2. Right SFA with high-grade stenosis           | RADIOLOGY    |
| proximally with good endograft results after angioplasty and stenting. |              |
|   3. 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
| artery to right foot.  4. Right posterior tibial artery was            |              |
| chronically occluded.     Electronically signed by Kirt Mclaughlin MD on    |              |
| 2019 3:50 PM                                                      |              |
+------------------------------------------------------------------------+--------------+
 
 
 
+------------------------------------------------------------------------+--------------+
| Narrative                                                              | Performed At |
+------------------------------------------------------------------------+--------------+
|   LOWER EXTREMITY ARTERIOGRAM, UNILATERAL     PREOPERATIVE             |   [facility]     |
| DIAGNOSIS:    Critical limb ischemia of right lower extremity with     | RADIOLOGY    |
| poorly healing wound of right great toe     POSTOPERATIVE              |              |
| DIAGNOSIS:    Same     PROCEDURE:  1. Moderate conscious sedation  2.  |              |
| Ultrasound guided access of the left common femoral artery  3.         |              |
| Aortogram  4. Selective right common femoral artery catheterization    |              |
| with right leg runoff  5. Right SFA stenting using 6 x 80 mm           |              |
| self-expanding stent  6 Drug eluting balloon angioplasty of right SFA  |              |
| using 4 x 100 mm Lutonix balloon     SURGEON: Kirt Mclaughlin MD              |              |
| ASSISTANT: None     ANESTHESIA: Moderate sedation and local anesthesia |              |
|      Informed consent was obtained from the patient. Continuous        |              |
| cardiac monitoring was performed throughout the procedure.  Conscious  |              |
| sedation was provided by the nursing staff during the procedure under  |              |
| my supervision.  Sedation time: 34 minutes  Medications: 2 mg Versed   |              |
| IV, 50 mcg Fentanyl IV     ESTIMATED BLOOD LOSS: Minimal               |              |
| CONTRAST:    53 mL of Isovue 250     INDICATIONS:    See preoperative  |              |
| history and physical     FINDINGS:    Aorta and iliac arteries         |              |
| calcified but without significant stenosis. Right SFA with high-grade  |              |
| stenosis proximally. After angioplasty and stenting, good angiographic |              |
|  results. Good two-vessel runoff to right foot via right anterior      |              |
| tibial artery and peroneal artery. Right posterior tibial artery was   |              |
| chronically occluded.     DESCRIPTION OF PROCEDURE:    The patient was |              |
|  properly identified and brought to the Cath Lab. The patient was      |              |
| placed supine on the catheter table and patient was given moderate     |              |
| sedation by nursing staff under my supervision. Patient's bilateral    |              |
| groins were then prepped and draped in the usual sterile fashion.      |              |
| Local anesthetic was given to the left groin and the left common       |              |
| femoral artery was accessed under ultrasound guidance using a          |              |
| micropuncture needle. A micropuncture sheath was inserted over a wire  |              |
| and up sized to a 4 French sheath. A Omni flush catheter was then      |              |
| inserted into the distal aorta and aortogram was then performed with   |              |
| the findings as described above. The Omni Flush catheter was then used |              |
|  to guide a Glidewire into the right common femoral artery and then    |              |
| the catheter was then advanced over the wire. A right lower extremity  |              |
| runoff was then performed with the findings as described above.        |              |
| Next, an Amplatzer wire was then inserted over the catheter and the 4  |              |
| French sheath was removed. A 6 French destination sheath was then      |              |
| inserted over the wire with the tip of the sheath being placed into    |              |
| the right common femoral artery. A vertebral catheter was inserted     |              |
| over the wire and the wire was then removed. A Glidewire was then      |              |
| placed into the vertebral catheter and used to cross through the       |              |
| stenotic right SFA.    Next, a 260 fix core wire was then inserted     |              |
| over the catheter.    Right SFA was stented using 6 x 80 mm            |              |
| self-expanding stent. Drug eluting balloon angioplasty of the right    |              |
| SFA was then performed using 4 x 100 mm Lutonix balloon.     A final   |              |
| arteriogram was then performed through the sheath. The destination     |              |
 
| sheath was then removed and a Mynx closure device was then used on the |              |
|  left common femoral artery and hemostasis was ensured. The patient    |              |
| was then taken to the observation unit for further monitoring.         |              |
+------------------------------------------------------------------------+--------------+
 
 
 
+-------------------------------------------------------------------------------------------
--------------------------------------------------------------------------------------------
-----------------------------------+
| Procedure Note                                                                            
                                                                                            
                                   |
+-------------------------------------------------------------------------------------------
--------------------------------------------------------------------------------------------
-----------------------------------+
|   Denny, Rad Results In - 2019  8:40 AM PST  LOWER EXTREMITY ARTERIOGRAM,             
                                                                                            
                                   |
| UNILATERALPREOPERATIVE DIAGNOSIS:  Critical limb ischemia of right lower extremity with   
                                                                                            
                                   |
| poorly healing wound of right great toePOSTOPERATIVE DIAGNOSIS:  SamePROCEDURE:1.         
                                                                                            
                                   |
| Moderate conscious sedation2. Ultrasound guided access of the left common femoral         
                                                                                            
                                   |
| artery3. Aortogram4. Selective right common femoral artery catheterization with right     
                                                                                            
                                   |
| leg runoff5. Right SFA stenting using 6 x 80 mm self-expanding stent6 Drug eluting        
                                                                                            
                                   |
| balloon angioplasty of right SFA using 4 x 100 mm Lutonix balloonSURGEON: Kirt Mclaughlin,        
                                                                                            
                                   |
| MDASSISTANT: NoneANESTHESIA: Moderate sedation and local anesthesiaInformed consent was   
                                                                                            
                                   |
| obtained from the patient. Continuous cardiac monitoring was performed throughout the     
                                                                                            
                                   |
| procedure.Conscious sedation was provided by the nursing staff during the procedure       
                                                                                            
                                   |
| under my supervision.Sedation time: 34 minutesMedications: 2 mg Versed IV, 50 mcg         
                                                                                            
                                   |
| Fentanyl IVESTIMATED BLOOD LOSS: MinimalCONTRAST:  53 mL of Isovue 250INDICATIONS:  See   
                                                                                            
                                   |
| preoperative history and physicalFINDINGS:  Aorta and iliac arteries calcified but        
                                                                                            
                                   |
| without significant stenosis. Right SFA with high-grade stenosis proximally. After        
                                                                                            
                                   |
| angioplasty and stenting, good angiographic results. Good two-vessel runoff to right      
                                                                                            
 
                                   |
| foot via right anterior tibial artery and peroneal artery. Right posterior tibial artery  
                                                                                            
                                   |
|  was chronically occluded.DESCRIPTION OF PROCEDURE:  The patient was properly identified  
                                                                                            
                                   |
|  and brought to the Cath Lab. The patient was placed supine on the catheter table and     
                                                                                            
                                   |
| patient was given moderate sedation by nursing staff under my supervision. Patient's      
                                                                                            
                                   |
| bilateral groins were then prepped and draped in the usual sterile fashion. Local         
                                                                                            
                                   |
| anesthetic was given to the left groin and the left common femoral artery was accessed    
                                                                                            
                                   |
| under ultrasound guidance using a micropuncture needle. A micropuncture sheath was        
                                                                                            
                                   |
| inserted over a wire and up sized to a 4 French sheath. A Omni flush catheter was then    
                                                                                            
                                   |
| inserted into the distal aorta and aortogram was then performed with the findings as      
                                                                                            
                                   |
| described above. The Omni Flush catheter was then used to guide a Glidewire into the      
                                                                                            
                                   |
| right common femoral artery and then the catheter was then advanced over the wire. A      
                                                                                            
                                   |
| right lower extremity runoff was then performed with the findings as described            
                                                                                            
                                   |
| above.Next, an Amplatzer wire was then inserted over the catheter and the 4 French        
                                                                                            
                                   |
| sheath was removed. A 6 French destination sheath was then inserted over the wire with    
                                                                                            
                                   |
| the tip of the sheath being placed into the right common femoral artery. A vertebral      
                                                                                            
                                   |
| catheter was inserted over the wire and the wire was then removed. A Glidewire was then   
                                                                                            
                                   |
| placed into the vertebral catheter and used to cross through the stenotic right SFA.      
                                                                                            
                                   |
| Next, a 260 fix core wire was then inserted over the catheter.  Right SFA was stented     
                                                                                            
                                   |
| using 6 x 80 mm self-expanding stent. Drug eluting balloon angioplasty of the right SFA   
                                                                                            
                                   |
| was then performed using 4 x 100 mm Lutonix balloon.A final arteriogram was then          
                                                                                            
 
                                   |
| performed through the sheath. The destination sheath was then removed and a Mynx closure  
                                                                                            
                                   |
|  device was then used on the left common femoral artery and hemostasis was ensured. The   
                                                                                            
                                   |
| patient was then taken to the observation unit for further monitoring.IMPRESSION:1.       
                                                                                            
                                   |
| Aorta and iliac arteries were calcified but without significant stenosis.2. Right SFA     
                                                                                            
                                   |
| with high-grade stenosis proximally with good endograft results after angioplasty and     
                                                                                            
                                   |
| stenting.3. Two-vessel runoff via right anterior tibial artery and peroneal artery to     
                                                                                            
                                   |
| right foot.4. 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       
                                                                                            
                                   |
| signed by Kirt Mclaughlin MD on 2019 3:50 PM                                              
                                                                                            
                                   |
|                                                                                           
                                                                                            
                                   |
|A final arteriogram was then performed through the sheath. The destination sheath was then 
removed and a Mynx closure device was then used on the left common femoral artery and hemost
asis was ensured. The patient was  |
|then taken to the observation unit for further monitoring.                                 
                                                                                            
                                   |
|                                                                                           
                                                                                            
                                   |
|IMPRESSION:                                                                                
                                                                                            
                                  |
|                                                                                           
                                                                                            
                                   |
|1. Aorta and iliac arteries were calcified but without significant stenosis.               
                                                                                            
                                   |
|2. Right SFA with high-grade stenosis proximally with good endograft results after angiopla
sty and stenting.                                                                           
                                   |
|3. Two-vessel runoff via right anterior tibial artery and peroneal artery to right foot.   
                                                                                            
                                   |
|4. 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                                 
                                                                                            
                                   |
|                                                                                           
                                                                                            
                                   |
|Electronically signed by Kirt Mclaughlin MD on 2019 3:50 PM                                
                                                                                            
 
                                   |
+-------------------------------------------------------------------------------------------
--------------------------------------------------------------------------------------------
-----------------------------------+
 
 
 
+--------------------+------------------+--------------------+--------------+
| Performing         | Address          | City/State/Zipcode | Phone Number |
| Organization       |                  |                    |              |
+--------------------+------------------+--------------------+--------------+
|   [facility] |   [address] | [place] [address] |              |
+--------------------+------------------+--------------------+--------------+
 IR angiogram extremity right (2019  6:45 PM)
 
+------------------------------------------------------------------------+--------------+
| Impressions                                                            | Performed At |
+------------------------------------------------------------------------+--------------+
|      1. Aorta and iliac arteries were calcified but without            |   [facility]     |
| significant stenosis.  2. Right SFA with high-grade stenosis           | RADIOLOGY    |
| proximally with good endograft results after angioplasty and stenting. |              |
|   3. 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
| artery to right foot.  4. Right posterior tibial artery was            |              |
| chronically occluded.     Electronically signed by Kirt Mclaughlin MD on    |              |
| 2019 3:50 PM                                                      |              |
+------------------------------------------------------------------------+--------------+
 
 
 
+------------------------------------------------------------------------+--------------+
| Narrative                                                              | Performed At |
+------------------------------------------------------------------------+--------------+
|   LOWER EXTREMITY ARTERIOGRAM, UNILATERAL     PREOPERATIVE             |   [facility]     |
| DIAGNOSIS:    Critical limb ischemia of right lower extremity with     | RADIOLOGY    |
| poorly healing wound of right great toe     POSTOPERATIVE              |              |
| DIAGNOSIS:    Same     PROCEDURE:  1. Moderate conscious sedation  2.  |              |
| Ultrasound guided access of the left common femoral artery  3.         |              |
| Aortogram  4. Selective right common femoral artery catheterization    |              |
| with right leg runoff  5. Right SFA stenting using 6 x 80 mm           |              |
| self-expanding stent  6 Drug eluting balloon angioplasty of right SFA  |              |
| using 4 x 100 mm Lutonix balloon     SURGEON: Kirt Mclaughlin MD              |              |
| ASSISTANT: None     ANESTHESIA: Moderate sedation and local anesthesia |              |
|      Informed consent was obtained from the patient. Continuous        |              |
| cardiac monitoring was performed throughout the procedure.  Conscious  |              |
| sedation was provided by the nursing staff during the procedure under  |              |
| my supervision.  Sedation time: 34 minutes  Medications: 2 mg Versed   |              |
| IV, 50 mcg Fentanyl IV     ESTIMATED BLOOD LOSS: Minimal               |              |
| CONTRAST:    53 mL of Isovue 250     INDICATIONS:    See preoperative  |              |
| history and physical     FINDINGS:    Aorta and iliac arteries         |              |
| calcified but without significant stenosis. Right SFA with high-grade  |              |
| stenosis proximally. After angioplasty and stenting, good angiographic |              |
|  results. Good two-vessel runoff to right foot via right anterior      |              |
| tibial artery and peroneal artery. Right posterior tibial artery was   |              |
| chronically occluded.     DESCRIPTION OF PROCEDURE:    The patient was |              |
|  properly identified and brought to the Cath Lab. The patient was      |              |
| placed supine on the catheter table and patient was given moderate     |              |
| sedation by nursing staff under my supervision. Patient's bilateral    |              |
| groins were then prepped and draped in the usual sterile fashion.      |              |
| Local anesthetic was given to the left groin and the left common       |              |
| femoral artery was accessed under ultrasound guidance using a          |              |
 
| micropuncture needle. A micropuncture sheath was inserted over a wire  |              |
| and up sized to a 4 French sheath. A Omni flush catheter was then      |              |
| inserted into the distal aorta and aortogram was then performed with   |              |
| the findings as described above. The Omni Flush catheter was then used |              |
|  to guide a Glidewire into the right common femoral artery and then    |              |
| the catheter was then advanced over the wire. A right lower extremity  |              |
| runoff was then performed with the findings as described above.        |              |
| Next, an Amplatzer wire was then inserted over the catheter and the 4  |              |
| French sheath was removed. A 6 French destination sheath was then      |              |
| inserted over the wire with the tip of the sheath being placed into    |              |
| the right common femoral artery. A vertebral catheter was inserted     |              |
| over the wire and the wire was then removed. A Glidewire was then      |              |
| placed into the vertebral catheter and used to cross through the       |              |
| stenotic right SFA.    Next, a 260 fix core wire was then inserted     |              |
| over the catheter.    Right SFA was stented using 6 x 80 mm            |              |
| self-expanding stent. Drug eluting balloon angioplasty of the right    |              |
| SFA was then performed using 4 x 100 mm Lutonix balloon.     A final   |              |
| arteriogram was then performed through the sheath. The destination     |              |
| sheath was then removed and a Mynx closure device was then used on the |              |
|  left common femoral artery and hemostasis was ensured. The patient    |              |
| was then taken to the observation unit for further monitoring.         |              |
+------------------------------------------------------------------------+--------------+
 
 
 
+-------------------------------------------------------------------------------------------
--------------------------------------------------------------------------------------------
-----------------------------------+
| Procedure Note                                                                            
                                                                                            
                                   |
+-------------------------------------------------------------------------------------------
--------------------------------------------------------------------------------------------
-----------------------------------+
|   Denny, Rad Results In - 2019  8:40 AM PST  LOWER EXTREMITY ARTERIOGRAM,             
                                                                                            
                                   |
| UNILATERALPREOPERATIVE DIAGNOSIS:  Critical limb ischemia of right lower extremity with   
                                                                                            
                                   |
| poorly healing wound of right great toePOSTOPERATIVE DIAGNOSIS:  SamePROCEDURE:1.         
                                                                                            
                                   |
| Moderate conscious sedation2. Ultrasound guided access of the left common femoral         
                                                                                            
                                   |
| artery3. Aortogram4. Selective right common femoral artery catheterization with right     
                                                                                            
                                   |
| leg runoff5. Right SFA stenting using 6 x 80 mm self-expanding stent6 Drug eluting        
                                                                                            
                                   |
| balloon angioplasty of right SFA using 4 x 100 mm Lutonix balloonSURGEON: Kirt Mclaughlin,        
                                                                                            
                                   |
| MDASSISTANT: NoneANESTHESIA: Moderate sedation and local anesthesiaInformed consent was   
                                                                                            
                                   |
| obtained from the patient. Continuous cardiac monitoring was performed throughout the     
                                                                                            
 
                                   |
| procedure.Conscious sedation was provided by the nursing staff during the procedure       
                                                                                            
                                   |
| under my supervision.Sedation time: 34 minutesMedications: 2 mg Versed IV, 50 mcg         
                                                                                            
                                   |
| Fentanyl IVESTIMATED BLOOD LOSS: MinimalCONTRAST:  53 mL of Isovue 250INDICATIONS:  See   
                                                                                            
                                   |
| preoperative history and physicalFINDINGS:  Aorta and iliac arteries calcified but        
                                                                                            
                                   |
| without significant stenosis. Right SFA with high-grade stenosis proximally. After        
                                                                                            
                                   |
| angioplasty and stenting, good angiographic results. Good two-vessel runoff to right      
                                                                                            
                                   |
| foot via right anterior tibial artery and peroneal artery. Right posterior tibial artery  
                                                                                            
                                   |
|  was chronically occluded.DESCRIPTION OF PROCEDURE:  The patient was properly identified  
                                                                                            
                                   |
|  and brought to the Cath Lab. The patient was placed supine on the catheter table and     
                                                                                            
                                   |
| patient was given moderate sedation by nursing staff under my supervision. Patient's      
                                                                                            
                                   |
| bilateral groins were then prepped and draped in the usual sterile fashion. Local         
                                                                                            
                                   |
| anesthetic was given to the left groin and the left common femoral artery was accessed    
                                                                                            
                                   |
| under ultrasound guidance using a micropuncture needle. A micropuncture sheath was        
                                                                                            
                                   |
| inserted over a wire and up sized to a 4 French sheath. A Omni flush catheter was then    
                                                                                            
                                   |
| inserted into the distal aorta and aortogram was then performed with the findings as      
                                                                                            
                                   |
| described above. The Omni Flush catheter was then used to guide a Glidewire into the      
                                                                                            
                                   |
| right common femoral artery and then the catheter was then advanced over the wire. A      
                                                                                            
                                   |
| right lower extremity runoff was then performed with the findings as described            
                                                                                            
                                   |
| above.Next, an Amplatzer wire was then inserted over the catheter and the 4 French        
                                                                                            
                                   |
| sheath was removed. A 6 French destination sheath was then inserted over the wire with    
                                                                                            
 
                                   |
| the tip of the sheath being placed into the right common femoral artery. A vertebral      
                                                                                            
                                   |
| catheter was inserted over the wire and the wire was then removed. A Glidewire was then   
                                                                                            
                                   |
| placed into the vertebral catheter and used to cross through the stenotic right SFA.      
                                                                                            
                                   |
| Next, a 260 fix core wire was then inserted over the catheter.  Right SFA was stented     
                                                                                            
                                   |
| using 6 x 80 mm self-expanding stent. Drug eluting balloon angioplasty of the right SFA   
                                                                                            
                                   |
| was then performed using 4 x 100 mm Lutonix balloon.A final arteriogram was then          
                                                                                            
                                   |
| performed through the sheath. The destination sheath was then removed and a Mynx closure  
                                                                                            
                                   |
|  device was then used on the left common femoral artery and hemostasis was ensured. The   
                                                                                            
                                   |
| patient was then taken to the observation unit for further monitoring.IMPRESSION:1.       
                                                                                            
                                   |
| Aorta and iliac arteries were calcified but without significant stenosis.2. Right SFA     
                                                                                            
                                   |
| with high-grade stenosis proximally with good endograft results after angioplasty and     
                                                                                            
                                   |
| stenting.3. Two-vessel runoff via right anterior tibial artery and peroneal artery to     
                                                                                            
                                   |
| right foot.4. 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       
                                                                                            
                                   |
| signed by Kirt Mclaughlin MD on 2019 3:50 PM                                              
                                                                                            
                                   |
|                                                                                           
                                                                                            
                                   |
|A final arteriogram was then performed through the sheath. The destination sheath was then 
removed and a Mynx closure device was then used on the left common femoral artery and hemost
asis was ensured. The patient was  |
|then taken to the observation unit for further monitoring.                                 
                                                                                            
                                   |
|                                                                                           
                                                                                            
                                   |
|IMPRESSION:                                                                                
                                                                                            
                                  |
|                                                                                           
                                                                                            
 
                                   |
|1. Aorta and iliac arteries were calcified but without significant stenosis.               
                                                                                            
                                   |
|2. Right SFA with high-grade stenosis proximally with good endograft results after angiopla
sty and stenting.                                                                           
                                   |
|3. Two-vessel runoff via right anterior tibial artery and peroneal artery to right foot.   
                                                                                            
                                   |
|4. 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                                 
                                                                                            
                                   |
|                                                                                           
                                                                                            
                                   |
|Electronically signed by Kirt Mclaughlin MD on 2019 3:50 PM                                
                                                                                            
                                   |
+-------------------------------------------------------------------------------------------
--------------------------------------------------------------------------------------------
-----------------------------------+
 
 
 
+--------------------+------------------+--------------------+--------------+
| Performing         | Address          | City/State/Zipcode | Phone Number |
| Organization       |                  |                    |              |
+--------------------+------------------+--------------------+--------------+
|   [facility] |   [address] | [facility] [place] [address] |              |
+--------------------+------------------+--------------------+--------------+
 IR aortagram abdominal (2019  6:45 PM)
 
+------------------------------------------------------------------------+--------------+
| Impressions                                                            | Performed At |
+------------------------------------------------------------------------+--------------+
|      1. Aorta and iliac arteries were calcified but without            |   [facility]     |
| significant stenosis.  2. Right SFA with high-grade stenosis           | RADIOLOGY    |
| proximally with good endograft results after angioplasty and stenting. |              |
|   3. 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
| artery to right foot.  4. Right posterior tibial artery was            |              |
| chronically occluded.     Electronically signed by Kirt Mclaughlin MD on    |              |
| 2019 3:50 PM                                                      |              |
+------------------------------------------------------------------------+--------------+
 
 
 
+------------------------------------------------------------------------+--------------+
| Narrative                                                              | Performed At |
+------------------------------------------------------------------------+--------------+
|   LOWER EXTREMITY ARTERIOGRAM, UNILATERAL     PREOPERATIVE             |   [facility]     |
| DIAGNOSIS:    Critical limb ischemia of right lower extremity with     | RADIOLOGY    |
| poorly healing wound of right great toe     POSTOPERATIVE              |              |
| DIAGNOSIS:    Same     PROCEDURE:  1. Moderate conscious sedation  2.  |              |
| Ultrasound guided access of the left common femoral artery  3.         |              |
| Aortogram  4. Selective right common femoral artery catheterization    |              |
| with right leg runoff  5. Right SFA stenting using 6 x 80 mm           |              |
| self-expanding stent  6 Drug eluting balloon angioplasty of right SFA  |              |
| using 4 x 100 mm Lutonix balloon     SURGEON: Kirt Mclaughlin MD              |              |
| ASSISTANT: None     ANESTHESIA: Moderate sedation and local anesthesia |              |
 
|      Informed consent was obtained from the patient. Continuous        |              |
| cardiac monitoring was performed throughout the procedure.  Conscious  |              |
| sedation was provided by the nursing staff during the procedure under  |              |
| my supervision.  Sedation time: 34 minutes  Medications: 2 mg Versed   |              |
| IV, 50 mcg Fentanyl IV     ESTIMATED BLOOD LOSS: Minimal               |              |
| CONTRAST:    53 mL of Isovue 250     INDICATIONS:    See preoperative  |              |
| history and physical     FINDINGS:    Aorta and iliac arteries         |              |
| calcified but without significant stenosis. Right SFA with high-grade  |              |
| stenosis proximally. After angioplasty and stenting, good angiographic |              |
|  results. Good two-vessel runoff to right foot via right anterior      |              |
| tibial artery and peroneal artery. Right posterior tibial artery was   |              |
| chronically occluded.     DESCRIPTION OF PROCEDURE:    The patient was |              |
|  properly identified and brought to the Cath Lab. The patient was      |              |
| placed supine on the catheter table and patient was given moderate     |              |
| sedation by nursing staff under my supervision. Patient's bilateral    |              |
| groins were then prepped and draped in the usual sterile fashion.      |              |
| Local anesthetic was given to the left groin and the left common       |              |
| femoral artery was accessed under ultrasound guidance using a          |              |
| micropuncture needle. A micropuncture sheath was inserted over a wire  |              |
| and up sized to a 4 French sheath. A Omni flush catheter was then      |              |
| inserted into the distal aorta and aortogram was then performed with   |              |
| the findings as described above. The Omni Flush catheter was then used |              |
|  to guide a Glidewire into the right common femoral artery and then    |              |
| the catheter was then advanced over the wire. A right lower extremity  |              |
| runoff was then performed with the findings as described above.        |              |
| Next, an Amplatzer wire was then inserted over the catheter and the 4  |              |
| French sheath was removed. A 6 French destination sheath was then      |              |
| inserted over the wire with the tip of the sheath being placed into    |              |
| the right common femoral artery. A vertebral catheter was inserted     |              |
| over the wire and the wire was then removed. A Glidewire was then      |              |
| placed into the vertebral catheter and used to cross through the       |              |
| stenotic right SFA.    Next, a 260 fix core wire was then inserted     |              |
| over the catheter.    Right SFA was stented using 6 x 80 mm            |              |
| self-expanding stent. Drug eluting balloon angioplasty of the right    |              |
| SFA was then performed using 4 x 100 mm Lutonix balloon.     A final   |              |
| arteriogram was then performed through the sheath. The destination     |              |
| sheath was then removed and a Mynx closure device was then used on the |              |
|  left common femoral artery and hemostasis was ensured. The patient    |              |
| was then taken to the observation unit for further monitoring.         |              |
+------------------------------------------------------------------------+--------------+
 
 
 
+-------------------------------------------------------------------------------------------
--------------------------------------------------------------------------------------------
-----------------------------------+
| Procedure Note                                                                            
                                                                                            
                                   |
+-------------------------------------------------------------------------------------------
--------------------------------------------------------------------------------------------
-----------------------------------+
|   La[facility] Baldwin Results In - 2019  8:40 AM PST  LOWER EXTREMITY ARTERIOGRAM,             
                                                                                            
                                   |
| UNILATERALPREOPERATIVE DIAGNOSIS:  Critical limb ischemia of right lower extremity with   
                                                                                            
                                   |
| poorly healing wound of right great toePOSTOPERATIVE DIAGNOSIS:  SamePROCEDURE:1.         
                                                                                            
 
                                   |
| Moderate conscious sedation2. Ultrasound guided access of the left common femoral         
                                                                                            
                                   |
| artery3. Aortogram4. Selective right common femoral artery catheterization with right     
                                                                                            
                                   |
| leg runoff5. Right SFA stenting using 6 x 80 mm self-expanding stent6 Drug eluting        
                                                                                            
                                   |
| balloon angioplasty of right SFA using 4 x 100 mm Lutonix balloonSURGEON: Kirt Mclaughlin,        
                                                                                            
                                   |
| MDASSISTANT: NoneANESTHESIA: Moderate sedation and local anesthesiaInformed consent was   
                                                                                            
                                   |
| obtained from the patient. Continuous cardiac monitoring was performed throughout the     
                                                                                            
                                   |
| procedure.Conscious sedation was provided by the nursing staff during the procedure       
                                                                                            
                                   |
| under my supervision.Sedation time: 34 minutesMedications: 2 mg Versed IV, 50 mcg         
                                                                                            
                                   |
| Fentanyl IVESTIMATED BLOOD LOSS: MinimalCONTRAST:  53 mL of Isovue 250INDICATIONS:  See   
                                                                                            
                                   |
| preoperative history and physicalFINDINGS:  Aorta and iliac arteries calcified but        
                                                                                            
                                   |
| without significant stenosis. Right SFA with high-grade stenosis proximally. After        
                                                                                            
                                   |
| angioplasty and stenting, good angiographic results. Good two-vessel runoff to right      
                                                                                            
                                   |
| foot via right anterior tibial artery and peroneal artery. Right posterior tibial artery  
                                                                                            
                                   |
|  was chronically occluded.DESCRIPTION OF PROCEDURE:  The patient was properly identified  
                                                                                            
                                   |
|  and brought to the Cath Lab. The patient was placed supine on the catheter table and     
                                                                                            
                                   |
| patient was given moderate sedation by nursing staff under my supervision. Patient's      
                                                                                            
                                   |
| bilateral groins were then prepped and draped in the usual sterile fashion. Local         
                                                                                            
                                   |
| anesthetic was given to the left groin and the left common femoral artery was accessed    
                                                                                            
                                   |
| under ultrasound guidance using a micropuncture needle. A micropuncture sheath was        
                                                                                            
                                   |
| inserted over a wire and up sized to a 4 French sheath. A Omni flush catheter was then    
                                                                                            
 
                                   |
| inserted into the distal aorta and aortogram was then performed with the findings as      
                                                                                            
                                   |
| described above. The Omni Flush catheter was then used to guide a Glidewire into the      
                                                                                            
                                   |
| right common femoral artery and then the catheter was then advanced over the wire. A      
                                                                                            
                                   |
| right lower extremity runoff was then performed with the findings as described            
                                                                                            
                                   |
| above.Next, an Amplatzer wire was then inserted over the catheter and the 4 French        
                                                                                            
                                   |
| sheath was removed. A 6 French destination sheath was then inserted over the wire with    
                                                                                            
                                   |
| the tip of the sheath being placed into the right common femoral artery. A vertebral      
                                                                                            
                                   |
| catheter was inserted over the wire and the wire was then removed. A Glidewire was then   
                                                                                            
                                   |
| placed into the vertebral catheter and used to cross through the stenotic right SFA.      
                                                                                            
                                   |
| Next, a 260 fix core wire was then inserted over the catheter.  Right SFA was stented     
                                                                                            
                                   |
| using 6 x 80 mm self-expanding stent. Drug eluting balloon angioplasty of the right SFA   
                                                                                            
                                   |
| was then performed using 4 x 100 mm Lutonix balloon.A final arteriogram was then          
                                                                                            
                                   |
| performed through the sheath. The destination sheath was then removed and a Mynx closure  
                                                                                            
                                   |
|  device was then used on the left common femoral artery and hemostasis was ensured. The   
                                                                                            
                                   |
| patient was then taken to the observation unit for further monitoring.IMPRESSION:1.       
                                                                                            
                                   |
| Aorta and iliac arteries were calcified but without significant stenosis.2. Right SFA     
                                                                                            
                                   |
| with high-grade stenosis proximally with good endograft results after angioplasty and     
                                                                                            
                                   |
| stenting.3. Two-vessel runoff via right anterior tibial artery and peroneal artery to     
                                                                                            
                                   |
| right foot.4. 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       
                                                                                            
                                   |
| signed by Kirt Mclaughlin MD on 2019 3:50 PM                                              
                                                                                            
 
                                   |
|                                                                                           
                                                                                            
                                   |
|A final arteriogram was then performed through the sheath. The destination sheath was then 
removed and a Mynx closure device was then used on the left common femoral artery and hemost
asis was ensured. The patient was  |
|then taken to the observation unit for further monitoring.                                 
                                                                                            
                                   |
|                                                                                           
                                                                                            
                                   |
|IMPRESSION:                                                                                
                                                                                            
                                  |
|                                                                                           
                                                                                            
                                   |
|1. Aorta and iliac arteries were calcified but without significant stenosis.               
                                                                                            
                                   |
|2. Right SFA with high-grade stenosis proximally with good endograft results after angiopla
sty and stenting.                                                                           
                                   |
|3. Two-vessel runoff via right anterior tibial artery and peroneal artery to right foot.   
                                                                                            
                                   |
|4. 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                                 
                                                                                            
                                   |
|                                                                                           
                                                                                            
                                   |
|Electronically signed by Kirt Mclaughlin MD on 2019 3:50 PM                                
                                                                                            
                                   |
+-------------------------------------------------------------------------------------------
--------------------------------------------------------------------------------------------
-----------------------------------+
 
 
 
+--------------------+------------------+--------------------+--------------+
| Performing         | Address          | City/State/Zipcode | Phone Number |
| Organization       |                  |                    |              |
+--------------------+------------------+--------------------+--------------+
|   [facility] |   [address] | [place] [address] |              |
+--------------------+------------------+--------------------+--------------+
 IR guidance vascular access US (2019  5:40 PM)
 
+------------------------------------------------------------------------+--------------+
| Narrative                                                              | Performed At |
+------------------------------------------------------------------------+--------------+
|   This Point of Care (POC) ultrasound image has been reviewed and      |   [facility]     |
| interpreted by the physician identified as the performing physician in | RADIOLOGY    |
|  the   associated interpretation and report.                           |              |
+------------------------------------------------------------------------+--------------+
 
 
 
 
+--------------------+------------------+--------------------+--------------+
| Performing         | Address          | City/State/Zipcode | Phone Number |
| Organization       |                  |                    |              |
+--------------------+------------------+--------------------+--------------+
|   [facility] |   [address] | [place] [address] |              |
+--------------------+------------------+--------------------+--------------+
 CBC w/manual diff (2019  3:43 PM)Only the most recent of 2 results within the time danny marquez is included.
 
+----------------------+-------------------------+-------------------+-----------------+
| Component            | Value                   | Ref Range         | Performed At    |
+----------------------+-------------------------+-------------------+-----------------+
| WBC                  | 5.53Comment:            | 3.80 - 11.00 K/uL | [facility] |
+----------------------+-------------------------+-------------------+-----------------+
| RBC                  | 2.76 (L)                | 3.70 - 5.10 M/uL  | [facility] |
+----------------------+-------------------------+-------------------+-----------------+
| HGB                  | 9.4 (L)                 | 11.3 - 15.5 g/dL  | [facility] |
+----------------------+-------------------------+-------------------+-----------------+
| HCT                  | 28.4 (L)                | 34.0 - 46.0 %     | [facility] |
+----------------------+-------------------------+-------------------+-----------------+
| MCV                  | 102.9 (H)               | 80.0 - 100.0 fl   | [facility] |
+----------------------+-------------------------+-------------------+-----------------+
| MCH                  | 34.1 (H)                | 27.0 - 34.0 pg    | [facility] |
+----------------------+-------------------------+-------------------+-----------------+
| MCHC                 | 33.2                    | 32.0 - 35.5 g/dL  | [facility] |
+----------------------+-------------------------+-------------------+-----------------+
| RDW SD               | 61.3 (H)                | 37 - 53 fl        | [facility] |
+----------------------+-------------------------+-------------------+-----------------+
| PLT                  | 147 (L)Comment:         | 150 - 400 K/uL    | [facility] |
+----------------------+-------------------------+-------------------+-----------------+
| MPV                  | 9.3Comment:             | fl                | [facility] |
+----------------------+-------------------------+-------------------+-----------------+
| DIFF TYPE            | MANUAL                  |                   | [facility] |
+----------------------+-------------------------+-------------------+-----------------+
| Neutrophils Manual   | 64                      | %                 | [facility] |
+----------------------+-------------------------+-------------------+-----------------+
| Lymphocytes Manual   | 28                      | %                 | [facility] |
+----------------------+-------------------------+-------------------+-----------------+
| Monocytes Manual     | 8                       | %                 | [facility] |
+----------------------+-------------------------+-------------------+-----------------+
| Neutrophils Absolute | 3.54                    | 1.90 - 7.40 K/uL  | [facility] |
+----------------------+-------------------------+-------------------+-----------------+
| Lymphocytes Absolute | 1.55                    | 1.00 - 3.90 K/uL  | [facility] |
+----------------------+-------------------------+-------------------+-----------------+
| Monocytes Absolute   | 0.44                    | 0.00 - 0.80 K/uL  | [facility] |
+----------------------+-------------------------+-------------------+-----------------+
| MORPHOLOGY           | 1+Comment: ANISONORMAL  |                   | [facility] |
|                      | PLT MORPHTesting        |                   |                 |
|                      | performed at [facility];[address]    |                   |                 |
|                      | [address];[place]  |                   |                 |
|                      | [id number]                   |                   |                 |
|                      |                         |                   |                 |
+----------------------+-------------------------+-------------------+-----------------+
 
 
 
+-------------------+
| Specimen          |
+-------------------+
 
| Blood - Arm, Left |
+-------------------+
 
 
 
+-------------------+------------------+--------------------+--------------+
| Performing        | Address          | City/State/Zipcode | Phone Number |
| Organization      |                  |                    |              |
+-------------------+------------------+--------------------+--------------+
|   [facility] |   [address] | [place] [address] |              |
+-------------------+------------------+--------------------+--------------+
 CPK (2019  3:43 PM)
 
+-----------+-------------------------+--------------+-----------------+
| Component | Value                   | Ref Range    | Performed At    |
+-----------+-------------------------+--------------+-----------------+
| CPK       | 28 (L)Comment: Testing  | 30 - 240 U/L | [facility] |
|           | performed at [facility];[address]    |              |                 |
|           | [address];[place], [place]  |              |                 |
|           | [id number]                   |              |                 |
+-----------+-------------------------+--------------+-----------------+
 
 
 
+-------------------+
| Specimen          |
+-------------------+
| Blood - Arm, Left |
+-------------------+
 
 
 
+-------------------+------------------+--------------------+--------------+
| Performing        | Address          | City/State/Zipcode | Phone Number |
| Organization      |                  |                    |              |
+-------------------+------------------+--------------------+--------------+
|   [facility] |   [address] | [place] [address] |              |
+-------------------+------------------+--------------------+--------------+
 US lower extremty  arterial right (2019  2:23 PM)
 
+------------------------------------------------------------------------+--------------+
| Impressions                                                            | Performed At |
+------------------------------------------------------------------------+--------------+
|   1. Right leg shows biphasic inflow with a greater than 50% stenosis  |   [facility]     |
| at  the origin of the superficial femoral artery                       | RADIOLOGY    |
| (137-309).    Biphasic  outflow.  2. Two vessel runoff to the right    |              |
| foot.  Signed by: Eugenio Hoffman  Sign Date/Time: 2019 3:11 PM  |              |
+------------------------------------------------------------------------+--------------+
 
 
 
+------------------------------------------------------------------------+--------------+
| Narrative                                                              | Performed At |
+------------------------------------------------------------------------+--------------+
|   LOWER EXTREMITY ARTERIAL EXAM WITH IMAGING  CLINICAL INFORMATION:    |   [facility]     |
| The patient is a 68-year-old female presenting to the vascular lab     | RADIOLOGY    |
| with  complaints of right foot nonhealing wound.    We have been asked |              |
|  to  evaluate for peripheral arterial disease.  COMPARISON:  None      |              |
| PROCEDURE:  Imaging of the right lower extremity arteries was          |              |
| performed using both  color Doppler and spectral Doppler techniques    |              |
 
| with a 9 megahertz linear  array transducer.  FINDINGS:  RIGHT  CFA    |              |
| prox 137 biphasic  DFA prox 71 biphasic  SFA prox 309 biphasic  SFA    |              |
| mid 91 biphasic  SFA distal 127 biphasic  POP mid 96 biphasic  GIL     |              |
| prox 69 biphasic  GIL distal 145 biphasic  PTA prox 50 biphasic  PTA   |              |
| distal 58 biphasic  WINIFRED prox 74 biphasic  WINIFRED distal 0    absent     |              |
+------------------------------------------------------------------------+--------------+
 
 
 
+------------------------------------------------------------------------------------------+
| Procedure Note                                                                           |
+------------------------------------------------------------------------------------------+
|   Denny, Rad Results In - 2019  3:14 PM PST  LOWER EXTREMITY ARTERIAL EXAM WITH      |
| IMAGINGCLINICAL INFORMATION:The patient is a 68-year-old female presenting to the        |
| vascular lab withcomplaints of right foot nonhealing wound.  We have been asked          |
| toevaluate for peripheral arterial disease.COMPARISON:NonePROCEDURE:Imaging of the right |
|  lower extremity arteries was performed using bothcolor Doppler and spectral Doppler     |
| techniques with a 9 megahertz lineararray transducer.FINDINGS:RIGHTCFA prox 137          |
| biphasicDFA prox 71 biphasicSFA prox 309 biphasicSFA mid 91 biphasicSFA distal 127       |
| biphasicPOP mid 96 biphasicATA prox 69 biphasicATA distal 145 biphasicPTA prox 50        |
| biphasicPTA distal 58 biphasicPERA prox 74 biphasicPERA distal 0  absentIMPRESSION:1.    |
| Right leg shows biphasic inflow with a greater than 50% stenosis atthe origin of the     |
| superficial femoral artery (137-309).  Biphasicoutflow.2. Two vessel runoff to the right |
|  foot.Signed by: Jamilah Hoffman Date/Time: 2019 3:11 PM                       |
|RIGHT                                                                                    |
|CFA prox 137 biphasic                                                                     |
|DFA prox 71 biphasic                                                                      |
|SFA prox 309 biphasic                                                                     |
|SFA mid 91 biphasic                                                                       |
|SFA distal 127 biphasic                                                                   |
|POP mid 96 biphasic                                                                       |
|GIL prox 69 biphasic                                                                      |
|GIL distal 145 biphasic                                                                   |
|PTA prox 50 biphasic                                                                      |
|PTA distal 58 biphasic                                                                    |
|WINIFRED prox 74 biphasic                                                                     |
|WINIFRED distal 0  absent                                                                     |
|IMPRESSION:                                                                              |
|1. Right leg shows biphasic inflow with a greater than 50% stenosis at                    |
|the origin of the superficial femoral artery (137-309).  Biphasic                         |
|outflow.                                                                                 |
|2. Two vessel runoff to the right foot.                                                   |
|Signed by: Eugenio Hoffman                                                                |
|Sign Date/Time: 2019 3:11 PM                                                        |
+------------------------------------------------------------------------------------------+
 
 
 
+--------------------+------------------+--------------------+--------------+
| Performing         | Address          | City/State/Zipcode | Phone Number |
| Organization       |                  |                    |              |
+--------------------+------------------+--------------------+--------------+
|   [facility] |   [address] | [place] [address] |              |
+--------------------+------------------+--------------------+--------------+
 Renal function panel (2019  8:47 AM)
 
+----------------+--------------------------+-------------------+-----------------+
| Component      | Value                    | Ref Range         | Performed At    |
+----------------+--------------------------+-------------------+-----------------+
| SODIUM         | 139                      | 135 - 145 mmol/L  | [facility] |
 
+----------------+--------------------------+-------------------+-----------------+
| POTASSIUM      | 4.4                      | 3.5 - 4.9 mmol/L  | [facility] |
+----------------+--------------------------+-------------------+-----------------+
| CHLORIDE       | 99                       | 99 - 109 mmol/L   | [facility] |
+----------------+--------------------------+-------------------+-----------------+
| CO2            | 32                       | 23 - 32 mmol/L    | [facility] |
+----------------+--------------------------+-------------------+-----------------+
| ANION GAP AGAP | 12                       | 5 - 20 mmol/L     | [facility] |
+----------------+--------------------------+-------------------+-----------------+
| GLUCOSE        | 197 (H)                  | 65 - 99 mg/dL     | [facility] |
+----------------+--------------------------+-------------------+-----------------+
| BUN            | 24                       | 8 - 25 mg/dL      | [facility] |
+----------------+--------------------------+-------------------+-----------------+
| CREATININE     | 6.5 (H)                  | 0.50 - 1.00 mg/dL | [facility] |
+----------------+--------------------------+-------------------+-----------------+
| CALCIUM        | 8.7                      | 8.5 - 10.5 mg/dL  | [facility] |
+----------------+--------------------------+-------------------+-----------------+
| Albumin        | 2.2 (L)                  | 3.3 - 4.8 g/dL    | [facility] |
+----------------+--------------------------+-------------------+-----------------+
| PHOSPHORUS     | 4.4                      | 2.3 - 4.8 mg/dL   | [facility] |
+----------------+--------------------------+-------------------+-----------------+
| EGFR           | 6 (L)Comment: GFR <60:   | >60 mL/min/1.73m2 | [facility] |
|                | CHRONIC KIDNEY DISEASE,  |                   |                 |
|                | IF FOUND OVER A 3 MONTH  |                   |                 |
|                | PERIOD.GFR <15: KIDNEY   |                   |                 |
|                | FAILURE.FOR       |                   |                 |
|                | AMERIC[employer], MULTIPLY THE  |                   |                 |
|                | CALCULATED GFR BY        |                   |                 |
|                | 1.210.This eGFR is       |                   |                 |
|                | calculated using the     |                   |                 |
|                | MDRD IDMS traceable      |                   |                 |
|                | equation.Testing         |                   |                 |
|                | performed at [facility];[address]     |                   |                 |
|                | [address];[place], [place]   |                   |                 |
|                | [id number]                    |                   |                 |
+----------------+--------------------------+-------------------+-----------------+
 
 
 
+----------+
| Specimen |
+----------+
| Blood    |
+----------+
 
 
 
+-------------------+------------------+--------------------+--------------+
| Performing        | Address          | City/State/Zipcode | Phone Number |
| Organization      |                  |                    |              |
+-------------------+------------------+--------------------+--------------+
|   [facility] |   [address] | [place] [address] |              |
+-------------------+------------------+--------------------+--------------+
 Troponin I (2019  5:47 PM)Only the most recent of 2 results within the time period is
 included.
 
+------------+--------------------------+-------------------+-----------------+
| Component  | Value                    | Ref Range         | Performed At    |
+------------+--------------------------+-------------------+-----------------+
| TROPONIN I | 0.061Comment:   0.00 to  | 0.00 - 0.10 ng/mL | [facility] |
 
|            | 0.10     CONSISTENT WITH |                   |                 |
|            |  NORMAL POPULATION0.11   |                   |                 |
|            | to 0.60     CONSISTENT   |                   |                 |
|            | WITH INCREASED RISK FOR  |                   |                 |
|            | ADVERSE OUTCOMES>        |                   |                 |
|            | 0.60                     |                   |                 |
|            | CONSISTENT WITH WHO      |                   |                 |
|            | CRITERIA FOR ACUTE MI    |                   |                 |
|            | Testing performed at     |                   |                 |
|            | [facility];[address]            |                   |                 |
|            [address];[place] [address]   |                   |                 |
+------------+--------------------------+-------------------+-----------------+
 
 
 
+----------+
| Specimen |
+----------+
| Blood    |
+----------+
 
 
 
+-------------------+------------------+--------------------+--------------+
| Performing        | Address          | City/State/Zipcode | Phone Number |
| Organization      |                  |                    |              |
+-------------------+------------------+--------------------+--------------+
|   [facility] |   [address] | [place] [address] |              |
+-------------------+------------------+--------------------+--------------+
 ISTAT Troponin (2019 10:26 AM)
 
+----------------+-------+-----------+--------------+
| Component      | Value | Ref Range | Performed At |
+----------------+-------+-----------+--------------+
| POC Troponin I | 0.04  |           |              |
+----------------+-------+-----------+--------------+
 POC cardiac troponin (2019  9:54 AM)
 
+----------------------+--------------------------+-------------------+-----------------+
| Component            | Value                    | Ref Range         | Performed At    |
+----------------------+--------------------------+-------------------+-----------------+
| POC CARDIAC TROPONIN | 0.04Comment:   0.00 to   | 0.00 - 0.10 ng/mL | [facility] |
|                      | 0.10    Consistent with  |                   |                 |
|                      | normal population0.11 to |                   |                 |
|                      |  0.40    Consistent with |                   |                 |
|                      |  increased risk for      |                   |                 |
|                      | adverse                  |                   |                 |
|                      | outcomes>0.40            |                   |                 |
|                      |       Consistent with    |                   |                 |
|                      | WHO criteria for Acute   |                   |                 |
|                      | MI Testing performed at  |                   |                 |
|                      | [facility];[address]            |                   |                 |
|                      | [address];[place] [address]   |                   |                 |
+----------------------+--------------------------+-------------------+-----------------+
 
 
 
+-------------------+------------------+--------------------+--------------+
| Performing        | Address          | City/State/Zipcode | Phone Number |
| Organization      |                  |                    |              |
 
+-------------------+------------------+--------------------+--------------+
|   [facility] |   [address] | [place] [address] |              |
+-------------------+------------------+--------------------+--------------+
 EKG 12 LEAD UNIT PERFORMED (2019  9:44 AM)
 
+-------------------+--------------------------+-----------+--------------+
| Component         | Value                    | Ref Range | Performed At |
+-------------------+--------------------------+-----------+--------------+
| Ventricular Rate  | 93                       | BPM       | [facility]     |
+-------------------+--------------------------+-----------+--------------+
| Atrial Rate       | 93                       | BPM       | [facility]     |
+-------------------+--------------------------+-----------+--------------+
| P-R Interval      | 136                      | ms        | [facility]     |
+-------------------+--------------------------+-----------+--------------+
| QRS Duration      | 126                      | ms        | [facility]     |
+-------------------+--------------------------+-----------+--------------+
| Q-T Interval      | 416                      | ms        | [facility]     |
+-------------------+--------------------------+-----------+--------------+
| QTC Calculation   | 517                      | ms        | [facility]     |
| (Vanessa)           |                          |           |              |
+-------------------+--------------------------+-----------+--------------+
| Calculated P Axis | 84                       | degrees   | [facility]     |
+-------------------+--------------------------+-----------+--------------+
| Calculated R Axis | -30                      | degrees   | [facility]     |
+-------------------+--------------------------+-----------+--------------+
| Calculated T Axis | 37                       | degrees   | [facility]     |
+-------------------+--------------------------+-----------+--------------+
| Diagnosis         | Normal sinus rhythmLeft  |           | [facility]     |
|                   | axis                     |           |              |
|                   | deviationNon-specific    |           |              |
|                   | intra-ventricular        |           |              |
|                   | conduction blockCannot   |           |              |
|                   | rule out Septal infarct  |           |              |
|                   | , age                    |           |              |
|                   | undeterminedAbnormal     |           |              |
|                   | ECGWhen compared with    |           |              |
|                   | ECG of 2019       |           |              |
|                   | 16:30,QRS duration has   |           |              |
|                   | decreasedMinimal         |           |              |
|                   | criteria for Septal      |           |              |
|                   | infarct are now PresentT |           |              |
|                   |  wave inversion now      |           |              |
|                   | evident in Lateral       |           |              |
|                   | leadsThis ECG contains   |           |              |
|                   | Unconfirmed              |           |              |
|                   | Interpretation           |           |              |
|                   | Statements.    See ED    |           |              |
|                   | Record for Physician     |           |              |
|                   | Interpretation.          |           |              |
|                   | Confirmed by MUSE READ   |           |              |
|                   | ONLY, -COMPUTER (500),   |           |              |
|                   |  Sherman Grissom   |           |              |
|                   | Samm (123) on 2019  |           |              |
|                   | 3:51:40 AM               |           |              |
+-------------------+--------------------------+-----------+--------------+
 
 
 
+--------------+-------------------+--------------------+--------------+
| Performing   | Address           | City/State/Zipcode | Phone Number |
 
| Organization |                   |                    |              |
+--------------+-------------------+--------------------+--------------+
|   [facility]   |   [address] | [place] [address] |              |
+--------------+-------------------+--------------------+--------------+
 from Last 3 Months
 
 Insurance
 
 
+----------+--------+-------------+------+-------+----------------------+
| Payer    | Benefi | Subscriber  | Type | Phone | Address              |
|          | t Plan | ID          |      |       |                      |
|          |  /     |             |      |       |                      |
|          | Group  |             |      |       |                      |
+----------+--------+-------------+------+-------+----------------------+
| MEDICARE | MEDICA | [id number] |      |       |   [address]        |
|          | RE     |             |      |       | [place] [id number] |
|          | IP-OP  |             |      |       |                      |
+----------+--------+-------------+------+-------+----------------------+
| MEDICAID | MEDICA | [id number]    |      |       |   [address]        |
|          | ID     |             |      |       | [place], [place]          |
|          | [place] |             |      |       | [id number]           |
+----------+--------+-------------+------+-------+----------------------+
 
 
 
+----------------------+--------+-------------+--------+-------------+-----------------+
| Guarantor Name       | [employer] | Relation to | Date   | Phone       | Billing Address |
|                      | t Type |  Patient    | of     |             |                 |
|                      |        |             | Birth  |             |                 |
+----------------------+--------+-------------+--------+-------------+-----------------+
| CHERIE JO | Person | Self        | / |   Home:     |   [address]    |
|                      | al/Fam |             | 1950   | [phone] | [place]    |
|                      | [place]    |             |        | [phone]        | [phone]      |
+----------------------+--------+-------------+--------+-------------+-----------------+

## 2019-04-03 NOTE — XMS
Encounter Summary
  Created on: 2019
 
 Cherie Villalobos
 External Reference #: [id number]
 : 48
 Sex: Female
 
 Demographics
 
 
+-----------------------+--------------------------+
| Address               | [address]               |
|                       | [place]  [address] |
+-----------------------+--------------------------+
| Home Phone            | [phone]          |
+-----------------------+--------------------------+
| Preferred Language    | Unknown                  |
+-----------------------+--------------------------+
| Marital Status        |                   |
+-----------------------+--------------------------+
| Mormonism Affiliation | 1013                     |
+-----------------------+--------------------------+
| Race                  | Unknown                  |
+-----------------------+--------------------------+
| Ethnic Group          | Unknown                  |
+-----------------------+--------------------------+
 
 
 Author
 
 
+--------------+-----------------------+
| Author       | [facility] |
+--------------+-----------------------+
| Organization | [facility] |
+--------------+-----------------------+
| Address      | Unknown               |
+--------------+-----------------------+
| Phone        | Unavailable           |
+--------------+-----------------------+
 
 
 
 Support
 
 
+---------------+--------------+---------------------+-----------------+
| Name          | Relationship | Address             | Phone           |
+---------------+--------------+---------------------+-----------------+
| Anoop Villalobos | ECON         | [place], | [phone] |
|               |              |  [place]  [address]     |                 |
+---------------+--------------+---------------------+-----------------+
| Jennifer Dickson | ECON         | [place]       | [phone] |
|               |              | [address]               |                 |
+---------------+--------------+---------------------+-----------------+
 
 
 
 
 Care Team Providers
 
 
+-----------------------+------+-----------------+
| Care Team Member Name | Role | Phone           |
+-----------------------+------+-----------------+
| Ivy Couch DO      | PCP  | [phone] |
+-----------------------+------+-----------------+
 
 
 
 Encounter Details
 
 
+--------+------------+----------------------+-----------+-------------+
| Date   | Type       | Department           | Care Team | Description |
+--------+------------+----------------------+-----------+-------------+
| / | Procedure  |   [facility]    |           |             |
| 2019   | Pass       | [facility] [address]   |           |             |
|        |            | Floor [address] |           |             |
|        |            |   [address]     |           |             |
|        |            | [place] [address]   |           |             |
|        |            | [phone]         |           |             |
+--------+------------+----------------------+-----------+-------------+
 
 
 
 Social History
 
 
+---------------+------------+-----------+--------+------------------+
| Tobacco Use   | Types      | Packs/Day | Years  | Date             |
|               |            |           | Used   |                  |
+---------------+------------+-----------+--------+------------------+
| Former Smoker | Cigarettes | 1         | 30     | Quit: 2004 |
+---------------+------------+-----------+--------+------------------+
 
 
 
+---------------------+---+---+---+
| Smokeless Tobacco:  |   |   |   |
| Never Used          |   |   |   |
+---------------------+---+---+---+
 
 
 
+------------------------------+
| Comments: quite smoking  |
+------------------------------+
 
 
 
+-------------+-----------+---------+----------+
| Alcohol Use | Drinks/We | oz/Week | Comments |
|             | ek        |         |          |
+-------------+-----------+---------+----------+
| No          |           |         |          |
+-------------+-----------+---------+----------+
 
 
 
 
+------------------+---------------+
| Sex Assigned at  | Date Recorded |
| Birth            |               |
+------------------+---------------+
| Not on file      |               |
+------------------+---------------+
 as of this encounter
 
 Plan of Treatment
 
 
+--------+---------+-----------+----------------------+-------------+
| Date   | Type    | Specialty | Care Team            | Description |
+--------+---------+-----------+----------------------+-------------+
| 04/10/ | Office  | Podiatry  |   Srinivasan Jordan,  |             |
| 2019   | Visit   |           | DPM  [address], |             |
|        |         |           |  [place],  |             |
|        |         |           | [place] [address]             |             |
|        |         |           | [phone]         |             |
|        |         |           | [phone] (Fax)   |             |
+--------+---------+-----------+----------------------+-------------+
 as of this encounter
 
 Visit Diagnoses
 Not on filein this encounter

## 2019-04-03 NOTE — XMS
Encounter Summary
  Created on: 2019
 
 Cherie Villalobos
 External Reference #: [id number]
 : 48
 Sex: Female
 
 Demographics
 
 
+-----------------------+--------------------------+
| Address               | [address]               |
|                       | [place]  [address] |
+-----------------------+--------------------------+
| Home Phone            | [phone]          |
+-----------------------+--------------------------+
| Preferred Language    | Unknown                  |
+-----------------------+--------------------------+
| Marital Status        |                   |
+-----------------------+--------------------------+
| Episcopal Affiliation | 1013                     |
+-----------------------+--------------------------+
| Race                  | Unknown                  |
+-----------------------+--------------------------+
| Ethnic Group          | Unknown                  |
+-----------------------+--------------------------+
 
 
 Author
 
 
+--------------+-----------------------+
| Author       | [facility] |
+--------------+-----------------------+
| Organization | [facility] |
+--------------+-----------------------+
| Address      | Unknown               |
+--------------+-----------------------+
| Phone        | Unavailable           |
+--------------+-----------------------+
 
 
 
 Support
 
 
+---------------+--------------+---------------------+-----------------+
| Name          | Relationship | Address             | Phone           |
+---------------+--------------+---------------------+-----------------+
| Anoop Villalobos | ECON         | [place], | [phone] |
|               |              |  [place]  [address]     |                 |
+---------------+--------------+---------------------+-----------------+
| Jennifer Dickson | ECON         | [place]       | [phone] |
|               |              | [phone]               |                 |
+---------------+--------------+---------------------+-----------------+
 
 
 
 
 Care Team Providers
 
 
+-----------------------+------+-----------------+
| Care Team Member Name | Role | Phone           |
+-----------------------+------+-----------------+
| Ivy Couch DO      | PCP  | [phone] |
+-----------------------+------+-----------------+
 
 
 
 Encounter Details
 
 
+--------+-------------+----------------------+---------------------+-------------+
| Date   | Type        | Department           | Care Team           | Description |
+--------+-------------+----------------------+---------------------+-------------+
| / | Orders Only |   [facility]      |   Dionne Danielson MA |             |
| 2019   |             | Vascular Surgery     |                     |             |
|        |             | [address] |                     |             |
|        |             |  E  [place]     |                     |             |
|        |             | [address]           |                     |             |
|        |             | [phone]         |                     |             |
+--------+-------------+----------------------+---------------------+-------------+
 
 
 
 Social History
 
 
+---------------+------------+-----------+--------+------------------+
| Tobacco Use   | Types      | Packs/Day | Years  | Date             |
|               |            |           | Used   |                  |
+---------------+------------+-----------+--------+------------------+
| Former Smoker | Cigarettes | 1         | 30     | Quit: 2004 |
+---------------+------------+-----------+--------+------------------+
 
 
 
+---------------------+---+---+---+
| Smokeless Tobacco:  |   |   |   |
| Never Used          |   |   |   |
+---------------------+---+---+---+
 
 
 
+------------------------------+
| Comments: quite smoking 2004 |
+------------------------------+
 
 
 
+-------------+-----------+---------+----------+
| Alcohol Use | Drinks/We | oz/Week | Comments |
|             | ek        |         |          |
+-------------+-----------+---------+----------+
| No          |           |         |          |
+-------------+-----------+---------+----------+
 
 
 
 
+------------------+---------------+
| Sex Assigned at  | Date Recorded |
| Birth            |               |
+------------------+---------------+
| Not on file      |               |
+------------------+---------------+
 as of this encounter
 
 Plan of Treatment
 
 
+--------+---------+-----------+----------------------+-------------+
| Date   | Type    | Specialty | Care Team            | Description |
+--------+---------+-----------+----------------------+-------------+
| 04/10/ | Office  | Podiatry  |   Srinivasan Jordan,  |             |
| 2019   | Visit   |           | DPM  [address], |             |
|        |         |           |  [address]  [place],  |             |
|        |         |           | [place] [address]             |             |
|        |         |           | [phone]         |             |
|        |         |           | [phone] (Fax)   |             |
+--------+---------+-----------+----------------------+-------------+
 as of this encounter
 
 Visit Diagnoses
 Not on filein this encounter

## 2019-04-03 NOTE — XMS
Encounter Summary
  Created on: 2019
 
 Cherie Villalobos
 External Reference #: [id number]
 : 48
 Sex: Female
 
 Demographics
 
 
+-----------------------+--------------------------+
| Address               | [address]               |
|                       | [place]  [address] |
+-----------------------+--------------------------+
| Home Phone            | [phone]          |
+-----------------------+--------------------------+
| Preferred Language    | Unknown                  |
+-----------------------+--------------------------+
| Marital Status        |                   |
+-----------------------+--------------------------+
| Rastafarian Affiliation | 1013                     |
+-----------------------+--------------------------+
| Race                  | Unknown                  |
+-----------------------+--------------------------+
| Ethnic Group          | Unknown                  |
+-----------------------+--------------------------+
 
 
 Author
 
 
+--------------+-----------------------+
| Author       | [facility] |
+--------------+-----------------------+
| Organization | [facility] |
+--------------+-----------------------+
| Address      | Unknown               |
+--------------+-----------------------+
| Phone        | Unavailable           |
+--------------+-----------------------+
 
 
 
 Support
 
 
+---------------+--------------+---------------------+-----------------+
| Name          | Relationship | Address             | Phone           |
+---------------+--------------+---------------------+-----------------+
| Anoop Villalobos | ECON         | [place], | [phone] |
|               |              |  [place]  [address]     |                 |
+---------------+--------------+---------------------+-----------------+
| Jennifer Dickson | ECON         | [place]       | [phone] |
|               |              | [address]               |                 |
+---------------+--------------+---------------------+-----------------+
 
 
 
 
 Care Team Providers
 
 
+-----------------------+------+-----------------+
| Care Team Member Name | Role | Phone           |
+-----------------------+------+-----------------+
| Ivy Couch DO      | PCP  | [phone] |
+-----------------------+------+-----------------+
 
 
 
 Reason for Visit
 
 
+-------------------+----------+
| Reason            | Comments |
+-------------------+----------+
| Medication Refill |          |
+-------------------+----------+
 
 
 
 Encounter Details
 
 
+--------+--------+----------------------+----------------------+-------------+
| Date   | Type   | Department           | Care Team            | Description |
+--------+--------+----------------------+----------------------+-------------+
| / | Refill |   [facility]      |   oJão Asher MD  |             |
|    |        | [place]  [address]        |  [address]   |             |
|        |        | [address]   | [address]  [place], [place]    |             |
|        |        | [place] [address]   | [address]  [phone]  |             |
|        |        | [phone]         |  [phone] (Fax)  |             |
+--------+--------+----------------------+----------------------+-------------+
 
 
 
 Social History
 
 
+---------------+------------+-----------+--------+------------------+
| Tobacco Use   | Types      | Packs/Day | Years  | Date             |
|               |            |           | Used   |                  |
+---------------+------------+-----------+--------+------------------+
| Former Smoker | Cigarettes | 1         | 30     | Quit: 2004 |
+---------------+------------+-----------+--------+------------------+
 
 
 
+---------------------+---+---+---+
| Smokeless Tobacco:  |   |   |   |
| Never Used          |   |   |   |
+---------------------+---+---+---+
 
 
 
+------------------------------+
| Comments: quite smoking 2004 |
+------------------------------+
 
 
 
 
+-------------+-----------+---------+----------+
| Alcohol Use | Drinks/We | oz/Week | Comments |
|             | ek        |         |          |
+-------------+-----------+---------+----------+
| No          |           |         |          |
+-------------+-----------+---------+----------+
 
 
 
+------------------+---------------+
| Sex Assigned at  | Date Recorded |
| Birth            |               |
+------------------+---------------+
| Not on file      |               |
+------------------+---------------+
 as of this encounter
 
 Plan of Treatment
 
 
+--------+---------+-----------+----------------------+-------------+
| Date   | Type    | Specialty | Care Team            | Description |
+--------+---------+-----------+----------------------+-------------+
| 04/10/ | Office  | Podiatry  |   Srinivasan Jordan,  |             |
|    | Visit   |           | DPCLARE  [address], |             |
|        |         |           |  [address]  [place],  |             |
|        |         |           | [place] [address]             |             |
|        |         |           | [phone]         |             |
|        |         |           | [phone] (Fax)   |             |
+--------+---------+-----------+----------------------+-------------+
 as of this encounter
 
 Visit Diagnoses
 Not on filein this encounter

## 2019-04-03 NOTE — XMS
Encounter Summary
  Created on: 2019
 
 Cherie Villalobos
 External Reference #: [id number]
 : 48
 Sex: Female
 
 Demographics
 
 
+-----------------------+--------------------------+
| Address               | [address]               |
|                       | [place]  [address] |
+-----------------------+--------------------------+
| Home Phone            | [phone]          |
+-----------------------+--------------------------+
| Preferred Language    | Unknown                  |
+-----------------------+--------------------------+
| Marital Status        |                   |
+-----------------------+--------------------------+
| Tenriism Affiliation | 1013                     |
+-----------------------+--------------------------+
| Race                  | Unknown                  |
+-----------------------+--------------------------+
| Ethnic Group          | Unknown                  |
+-----------------------+--------------------------+
 
 
 Author
 
 
+--------------+-----------------------+
| Author       | [facility] |
+--------------+-----------------------+
| Organization | [facility] |
+--------------+-----------------------+
| Address      | Unknown               |
+--------------+-----------------------+
| Phone        | Unavailable           |
+--------------+-----------------------+
 
 
 
 Support
 
 
+---------------+--------------+---------------------+-----------------+
| Name          | Relationship | Address             | Phone           |
+---------------+--------------+---------------------+-----------------+
| Anoop Villalobos | ECON         | [place], | [phone] |
|               |              |  [place]  [address]     |                 |
+---------------+--------------+---------------------+-----------------+
| Jennifer Dickson | ECON         | [place]       | [phone] |
|               |              | [address]               |                 |
+---------------+--------------+---------------------+-----------------+
 
 
 
 
 Care Team Providers
 
 
+-----------------------+------+-----------------+
| Care Team Member Name | Role | Phone           |
+-----------------------+------+-----------------+
| Ivy Couch DO      | PCP  | [phone] |
+-----------------------+------+-----------------+
 
 
 
 Encounter Details
 
 
+--------+------------+----------------------+-----------+-------------+
| Date   | Type       | Department           | Care Team | Description |
+--------+------------+----------------------+-----------+-------------+
| / | Procedure  |   [facility]    |           |             |
| 2019   | Pass       | [facility] [address]   |           |             |
|        |            | Floor [address] |           |             |
|        |            |   [address]     |           |             |
|        |            | [place] [address]   |           |             |
|        |            | [phone]         |           |             |
+--------+------------+----------------------+-----------+-------------+
 
 
 
 Social History
 
 
+---------------+------------+-----------+--------+------------------+
| Tobacco Use   | Types      | Packs/Day | Years  | Date             |
|               |            |           | Used   |                  |
+---------------+------------+-----------+--------+------------------+
| Former Smoker | Cigarettes | 1         | 30     | Quit: 2004 |
+---------------+------------+-----------+--------+------------------+
 
 
 
+---------------------+---+---+---+
| Smokeless Tobacco:  |   |   |   |
| Never Used          |   |   |   |
+---------------------+---+---+---+
 
 
 
+------------------------------+
| Comments: quite smoking  |
+------------------------------+
 
 
 
+-------------+-----------+---------+----------+
| Alcohol Use | Drinks/We | oz/Week | Comments |
|             | ek        |         |          |
+-------------+-----------+---------+----------+
| No          |           |         |          |
+-------------+-----------+---------+----------+
 
 
 
 
+------------------+---------------+
| Sex Assigned at  | Date Recorded |
| Birth            |               |
+------------------+---------------+
| Not on file      |               |
+------------------+---------------+
 as of this encounter
 
 Plan of Treatment
 
 
+--------+---------+-----------+----------------------+-------------+
| Date   | Type    | Specialty | Care Team            | Description |
+--------+---------+-----------+----------------------+-------------+
| 04/10/ | Office  | Podiatry  |   Srinivasan Jordan,  |             |
| 2019   | Visit   |           | DPM  [address], |             |
|        |         |           |  [place],  |             |
|        |         |           | [place] [address]             |             |
|        |         |           | [phone]         |             |
|        |         |           | [phone] (Fax)   |             |
+--------+---------+-----------+----------------------+-------------+
 as of this encounter
 
 Visit Diagnoses
 Not on filein this encounter

## 2019-04-03 NOTE — XMS
Encounter Summary
  Created on: 2019
 
 Cherie Villalobos
 External Reference #: [id number]
 : 48
 Sex: Female
 
 Demographics
 
 
+-----------------------+--------------------------+
| Address               | [address]               |
|                       | [place]  [address] |
+-----------------------+--------------------------+
| Home Phone            | [phone]          |
+-----------------------+--------------------------+
| Preferred Language    | Unknown                  |
+-----------------------+--------------------------+
| Marital Status        |                   |
+-----------------------+--------------------------+
| Presybeterian Affiliation | 1013                     |
+-----------------------+--------------------------+
| Race                  | Unknown                  |
+-----------------------+--------------------------+
| Ethnic Group          | Unknown                  |
+-----------------------+--------------------------+
 
 
 Author
 
 
+--------------+-----------------------+
| Author       | [employer] |
+--------------+-----------------------+
| Organization | [facility] |
+--------------+-----------------------+
| Address      | Unknown               |
+--------------+-----------------------+
| Phone        | Unavailable           |
+--------------+-----------------------+
 
 
 
 Support
 
 
+---------------+--------------+---------------------+-----------------+
| Name          | Relationship | Address             | Phone           |
+---------------+--------------+---------------------+-----------------+
| Anoop Villalobos | ECON         | [place], | [phone] |
|               |              |  [place]  [address]     |                 |
+---------------+--------------+---------------------+-----------------+
| Jennifer Dickson | ECON         | [place]       | [phone] |
|               |              | [phone]               |                 |
+---------------+--------------+---------------------+-----------------+
 
 
 
 
 Care Team Providers
 
 
+-----------------------+------+-----------------+
| Care Team Member Name | Role | Phone           |
+-----------------------+------+-----------------+
| Ivy Couch DO      | PCP  | [phone] |
+-----------------------+------+-----------------+
 
 
 
 Encounter Details
 
 
+--------+-----------+----------------------+----------------------+-------------+
| Date   | Type      | Department           | Care Team            | Description |
+--------+-----------+----------------------+----------------------+-------------+
| / | Telephone |   [facility]      |   Kristen Tam, |             |
| 2019   |           | Vascular Surgery     |  RN                  |             |
|        |           | [address] |                      |             |
|        |           |  E  [place]     |                      |             |
|        |           | [address]           |                      |             |
|        |           | [phone]         |                      |             |
+--------+-----------+----------------------+----------------------+-------------+
 
 
 
 Social History
 
 
+---------------+------------+-----------+--------+------------------+
| Tobacco Use   | Types      | Packs/Day | Years  | Date             |
|               |            |           | Used   |                  |
+---------------+------------+-----------+--------+------------------+
| Former Smoker | Cigarettes | 1         | 30     | Quit: 2004 |
+---------------+------------+-----------+--------+------------------+
 
 
 
+---------------------+---+---+---+
| Smokeless Tobacco:  |   |   |   |
| Never Used          |   |   |   |
+---------------------+---+---+---+
 
 
 
+------------------------------+
| Comments: quite smoking 2004 |
+------------------------------+
 
 
 
+-------------+-----------+---------+----------+
| Alcohol Use | Drinks/We | oz/Week | Comments |
|             | ek        |         |          |
+-------------+-----------+---------+----------+
| No          |           |         |          |
+-------------+-----------+---------+----------+
 
 
 
 
+------------------+---------------+
| Sex Assigned at  | Date Recorded |
| Birth            |               |
+------------------+---------------+
| Not on file      |               |
+------------------+---------------+
 as of this encounter
 
 Plan of Treatment
 
 
+--------+---------+-----------+----------------------+-------------+
| Date   | Type    | Specialty | Care Team            | Description |
+--------+---------+-----------+----------------------+-------------+
| 04/10/ | Office  | Podiatry  |   Srinivasan Jordan,  |             |
| 2019   | Visit   |           | DPM  [address], |             |
|        |         |           |  [address]  [facility] [place]  |             |
|        |         |           | [place] [address]             |             |
|        |         |           | [phone]         |             |
|        |         |           | [phone] (Fax)   |             |
+--------+---------+-----------+----------------------+-------------+
 as of this encounter
 
 Visit Diagnoses
 Not on filein this encounter

## 2019-04-03 NOTE — XMS
Encounter Summary
  Created on: 2019
 
 Cherie Villalobos
 External Reference #: [id number]
 : 48
 Sex: Female
 
 Demographics
 
 
+-----------------------+--------------------------+
| Address               | [address]               |
|                       | [place]  [address] |
+-----------------------+--------------------------+
| Home Phone            | [phone]          |
+-----------------------+--------------------------+
| Preferred Language    | Unknown                  |
+-----------------------+--------------------------+
| Marital Status        |                   |
+-----------------------+--------------------------+
| Baptism Affiliation | 1013                     |
+-----------------------+--------------------------+
| Race                  | Unknown                  |
+-----------------------+--------------------------+
| Ethnic Group          | Unknown                  |
+-----------------------+--------------------------+
 
 
 Author
 
 
+--------------+-----------------------+
| Author       | [employer] |
+--------------+-----------------------+
| Organization | [facility] |
+--------------+-----------------------+
| Address      | Unknown               |
+--------------+-----------------------+
| Phone        | Unavailable           |
+--------------+-----------------------+
 
 
 
 Support
 
 
+---------------+--------------+---------------------+-----------------+
| Name          | Relationship | Address             | Phone           |
+---------------+--------------+---------------------+-----------------+
| Anoop Villalobos | ECON         | [place], | [phone] |
|               |              |  [place]  [address]     |                 |
+---------------+--------------+---------------------+-----------------+
| Jennifer Dickson | ECON         | [place]       | [phone] |
|               |              | [phone]               |                 |
+---------------+--------------+---------------------+-----------------+
 
 
 
 
 Care Team Providers
 
 
+-----------------------+------+-----------------+
| Care Team Member Name | Role | Phone           |
+-----------------------+------+-----------------+
| Ivy Couch DO      | PCP  | [phone] |
+-----------------------+------+-----------------+
 
 
 
 Encounter Details
 
 
+--------+-------------+----------------------+---------------------+-------------+
| Date   | Type        | Department           | Care Team           | Description |
+--------+-------------+----------------------+---------------------+-------------+
| / | Orders Only |   Dominic Burnham    |   Jessica Marley,  |             |
| 2019   |             | [facility]  | RDMS                |             |
|        |             | Ultrasound  [address]      |                     |             |
|        |             | [address]           |                     |             |
|        |             | [place] [address]   |                     |             |
|        |             | [phone]         |                     |             |
+--------+-------------+----------------------+---------------------+-------------+
 
 
 
 Social History
 
 
+---------------+------------+-----------+--------+------------------+
| Tobacco Use   | Types      | Packs/Day | Years  | Date             |
|               |            |           | Used   |                  |
+---------------+------------+-----------+--------+------------------+
| Former Smoker | Cigarettes | 1         | 30     | Quit: 2004 |
+---------------+------------+-----------+--------+------------------+
 
 
 
+---------------------+---+---+---+
| Smokeless Tobacco:  |   |   |   |
| Never Used          |   |   |   |
+---------------------+---+---+---+
 
 
 
+------------------------------+
| Comments: quite smoking 2004 |
+------------------------------+
 
 
 
+-------------+-----------+---------+----------+
| Alcohol Use | Drinks/We | oz/Week | Comments |
|             | ek        |         |          |
+-------------+-----------+---------+----------+
| No          |           |         |          |
+-------------+-----------+---------+----------+
 
 
 
 
+------------------+---------------+
| Sex Assigned at  | Date Recorded |
| Birth            |               |
+------------------+---------------+
| Not on file      |               |
+------------------+---------------+
 as of this encounter
 
 Plan of Treatment
 
 
+--------+---------+-----------+----------------------+-------------+
| Date   | Type    | Specialty | Care Team            | Description |
+--------+---------+-----------+----------------------+-------------+
| 04/10/ | Office  | Podiatry  |   Srinivasan Jordan,  |             |
| 2019   | Visit   |           | DPM  [address], |             |
|        |         |           |  [address]  [place],  |             |
|        |         |           | [place] [address]             |             |
|        |         |           | [phone]         |             |
|        |         |           | [phone] (Fax)   |             |
+--------+---------+-----------+----------------------+-------------+
 as of this encounter
 
 Visit Diagnoses
 Not on filein this encounter

## 2019-04-03 NOTE — XMS
Encounter Summary
  Created on: 2019
 
 Cherie Villalobos
 External Reference #: [id number]
 : 48
 Sex: Female
 
 Demographics
 
 
+-----------------------+--------------------------+
| Address               | [address]               |
|                       | [place]  [address] |
+-----------------------+--------------------------+
| Home Phone            | [phone]          |
+-----------------------+--------------------------+
| Preferred Language    | Unknown                  |
+-----------------------+--------------------------+
| Marital Status        |                   |
+-----------------------+--------------------------+
| Gnosticist Affiliation | 1013                     |
+-----------------------+--------------------------+
| Race                  | Unknown                  |
+-----------------------+--------------------------+
| Ethnic Group          | Unknown                  |
+-----------------------+--------------------------+
 
 
 Author
 
 
+--------------+--------------------------------------------+
| Author       | [facility]  |
|              | [facility] [place]                                |
+--------------+--------------------------------------------+
| Organization | [facility]  |
|              | [facility] [place]                                |
+--------------+--------------------------------------------+
| Address      | Unknown                                    |
+--------------+--------------------------------------------+
| Phone        | Unavailable                                |
+--------------+--------------------------------------------+
 
 
 
 Support
 
 
+---------------+--------------+---------------------+-----------------+
| Name          | Relationship | Address             | Phone           |
+---------------+--------------+---------------------+-----------------+
| Anoop Villalobos | ECON         | [address], | [phone] |
|               |              |  [place]  [address]     |                 |
+---------------+--------------+---------------------+-----------------+
| Jennifer Dickson | ECON         | [place]       | [phone] |
|               |              | [phone]               |                 |
+---------------+--------------+---------------------+-----------------+
 
 
 
 
 Care Team Providers
 
 
+--------------------------+------+-----------------+
| Care Team Member Name    | Role | Phone           |
+--------------------------+------+-----------------+
| Araseli Montelongo Activity  | PCP  | [phone] |
| Professional             |      |                 |
+--------------------------+------+-----------------+
 
 
 
 Reason for Visit
 
 
+--------+----------+
| Reason | Comments |
+--------+----------+
| Other  | Latitude |
+--------+----------+
 
 
 
 Encounter Details
 
 
+--------+-----------+----------------------+----------------------+------------------+
| Date   | Type      | Department           | Care Team            | Description      |
+--------+-----------+----------------------+----------------------+------------------+
| / | Telephone |   [facility]          |   Johnie John, | Other (Latitude) |
| 2019   |           | CARDIOLOGY  [address]    |  MD  [address] |                  |
|        |           | [address]  [place], |  St  [place],   |                  |
|        |           |  [place] [address]       | [place] [address]             |                  |
|        |           | [phone]         | [phone]         |                  |
|        |           |                      | [phone] (Fax)   |                  |
+--------+-----------+----------------------+----------------------+------------------+
 
 
 
 Social History
 
 
+---------------+------------+-----------+--------+------------------+
| Tobacco Use   | Types      | Packs/Day | Years  | Date             |
|               |            |           | Used   |                  |
+---------------+------------+-----------+--------+------------------+
| Former Smoker | Cigarettes | 1         | 30     | Quit: 2004 |
+---------------+------------+-----------+--------+------------------+
 
 
 
+---------------------+---+---+---+
| Smokeless Tobacco:  |   |   |   |
| Never Used          |   |   |   |
+---------------------+---+---+---+
 
 
 
 
+-------------+-----------+---------+----------+
| Alcohol Use | Drinks/We | oz/Week | Comments |
|             | ek        |         |          |
+-------------+-----------+---------+----------+
| No          |           |         |          |
+-------------+-----------+---------+----------+
 
 
 
+------------------+---------------+
| Sex Assigned at  | Date Recorded |
| Birth            |               |
+------------------+---------------+
| Not on file      |               |
+------------------+---------------+
 as of this encounter
 
 Functional Status
 
 
+---------------------------------------------+----------+--------------------+
| Functional Status                           | Response | Date of Assessment |
+---------------------------------------------+----------+--------------------+
| Are you deaf or do you have serious         | No       | 2018         |
| difficulty hearing?                         |          |                    |
+---------------------------------------------+----------+--------------------+
| Are you blind or do you have serious        | No       | 2018         |
| difficulty seeing, even when wearing        |          |                    |
| glasses?                                    |          |                    |
+---------------------------------------------+----------+--------------------+
| Do you have serious difficulty walking or   | No       | 2018         |
| climbing stairs? (5 years old or older)     |          |                    |
+---------------------------------------------+----------+--------------------+
| Do you have difficulty dressing or bathing? | No       | 2018         |
|  (5 years old or older)                     |          |                    |
+---------------------------------------------+----------+--------------------+
| Because of a physical, mental, or emotional | No       | 2018         |
|  condition, do you have difficulty doing    |          |                    |
| errands alone such as visiting a doctor's   |          |                    |
| office or shopping? [15 years old or        |          |                    |
| older)]                                     |          |                    |
+---------------------------------------------+----------+--------------------+
 
 
 
+---------------------------------------------+----------+--------------------+
| Cognitive Status                            | Response | Date of Assessment |
+---------------------------------------------+----------+--------------------+
| Because of a physical, mental, or emotional | No       | 2018         |
|  condition, do you have serious difficulty  |          |                    |
| concentrating, remembering, or making       |          |                    |
| decisions? (5 years old or older)           |          |                    |
+---------------------------------------------+----------+--------------------+
 as of this encounter
 
 Plan of Treatment
 
 
+--------+---------+------------+----------------------+-------------+
 
| Date   | Type    | Specialty  | Care Team            | Description |
+--------+---------+------------+----------------------+-------------+
| / | Office  | Cardiology |   Johnie John, |             |
|    | Visit   |            |  MD  [address] |             |
|        |         |            |  [place],   |             |
|        |         |            | [place] [address]             |             |
|        |         |            | [phone]         |             |
|        |         |            | [phone] (Fax)   |             |
+--------+---------+------------+----------------------+-------------+
 as of this encounter
 
 Visit Diagnoses
 Not on filein this encounter

## 2019-04-03 NOTE — XMS
Encounter Summary
  Created on: 2019
 
 Cherie Villalobos
 External Reference #: [id number]
 : 48
 Sex: Female
 
 Demographics
 
 
+-----------------------+--------------------------+
| Address               | [address]               |
|                       | [place]  [address] |
+-----------------------+--------------------------+
| Home Phone            | [phone]          |
+-----------------------+--------------------------+
| Preferred Language    | Unknown                  |
+-----------------------+--------------------------+
| Marital Status        |                   |
+-----------------------+--------------------------+
| Congregation Affiliation | 1013                     |
+-----------------------+--------------------------+
| Race                  | Unknown                  |
+-----------------------+--------------------------+
| Ethnic Group          | Unknown                  |
+-----------------------+--------------------------+
 
 
 Author
 
 
+--------------+-----------------------+
| Author       | Sherry Any+Times |
+--------------+-----------------------+
| Organization | [facility] |
+--------------+-----------------------+
| Address      | Unknown               |
+--------------+-----------------------+
| Phone        | Unavailable           |
+--------------+-----------------------+
 
 
 
 Support
 
 
+---------------+--------------+---------------------+-----------------+
| Name          | Relationship | Address             | Phone           |
+---------------+--------------+---------------------+-----------------+
| Anoop Villalobos | ECON         | [place], | [phone] |
|               |              |  [place]  [address]     |                 |
+---------------+--------------+---------------------+-----------------+
| Jennifer Dickson | ECON         | [place]       | [phone] |
|               |              | [address]               |                 |
+---------------+--------------+---------------------+-----------------+
 
 
 
 
 Care Team Providers
 
 
+-----------------------+------+-----------------+
| Care Team Member Name | Role | Phone           |
+-----------------------+------+-----------------+
| Ivy Couch DO      | PCP  | [phone] |
+-----------------------+------+-----------------+
 
 
 
 Reason for Visit
 
 
+-----------+----------------------------------+
| Reason    | Comments                         |
+-----------+----------------------------------+
| Follow-up | PAD (peripheral artery disease)  |
+-----------+----------------------------------+
 
 
 
 Encounter Details
 
 
+--------+---------+----------------------+----------------------+----------------------+
| Date   | Type    | Department           | Care Team            | Description          |
+--------+---------+----------------------+----------------------+----------------------+
| / | Office  |   [facility]      |   Kirt Mclaughlin MD     | Steal syndrome as    |
| 2019   | Visit   | Vascular Surgery     | [address]  | complication of      |
|        |         | [address] |  [place] [address]  | dialysis access,     |
|        |         |  E  [place], [place]     |  [phone]        | sequela (Primary     |
|        |         | [address]           | [phone] (Fax)   | Dx); ESRD (end stage |
|        |         | [phone]         |                      |  renal disease)      |
|        |         |                      |                      | (HCC); PAD           |
|        |         |                      |                      | (peripheral artery   |
|        |         |                      |                      | disease) ([facility])       |
+--------+---------+----------------------+----------------------+----------------------+
 
 
 
 Social History
 
 
+---------------+------------+-----------+--------+------------------+
| Tobacco Use   | Types      | Packs/Day | Years  | Date             |
|               |            |           | Used   |                  |
+---------------+------------+-----------+--------+------------------+
| Former Smoker | Cigarettes | 1         | 30     | Quit: 2004 |
+---------------+------------+-----------+--------+------------------+
 
 
 
+---------------------+---+---+---+
| Smokeless Tobacco:  |   |   |   |
| Never Used          |   |   |   |
+---------------------+---+---+---+
 
 
 
 
+------------------------------+
| Comments: quite smoking  |
+------------------------------+
 
 
 
+-------------+-----------+---------+----------+
| Alcohol Use | Drinks/We | oz/Week | Comments |
|             | ek        |         |          |
+-------------+-----------+---------+----------+
| No          |           |         |          |
+-------------+-----------+---------+----------+
 
 
 
+------------------+---------------+
| Sex Assigned at  | Date Recorded |
| Birth            |               |
+------------------+---------------+
| Not on file      |               |
+------------------+---------------+
 as of this encounter
 
 Last Filed Vital Signs
 
 
+-------------------+---------+-------------------------+
| Vital Sign        | Reading | Time Taken              |
+-------------------+---------+-------------------------+
| Blood Pressure    | 130/60  | 2019  2:36 PM PST |
+-------------------+---------+-------------------------+
| Pulse             | 77      | 2019  2:36 PM PST |
+-------------------+---------+-------------------------+
| Temperature       | -       | -                       |
+-------------------+---------+-------------------------+
| Respiratory Rate  | -       | -                       |
+-------------------+---------+-------------------------+
| Oxygen Saturation | 100%    | 2019  2:36 PM PST |
+-------------------+---------+-------------------------+
| Inhaled Oxygen    | -       | -                       |
| Concentration     |         |                         |
+-------------------+---------+-------------------------+
| Weight            | -       | -                       |
+-------------------+---------+-------------------------+
| Height            | -       | -                       |
+-------------------+---------+-------------------------+
| Body Mass Index   | -       | -                       |
+-------------------+---------+-------------------------+
 in this encounter
 
 Progress Notes
 Kirt Mclaughlin MD - 2019  3:00 PM PSTFormatting of this note may be different from the o
aleksandar.
 Ms. Villalobos is a pleasant 68 y.o. female with PMH significant for acute MI, anemia, atrial f
ibrillation, COPD, CAD, diabetes, ESRD, hyperlipidemia, and hypertension, who presents today
 for wound follow up. Patient states she was in the hospital recently, she had an XR of her 
right great toe which showed a fracture. The area is healing. She also complains of pain and
 some discoloration of her left 2nd finger. Patient reports she has been having continued pa
in in her right toe, and at the site of her left forefoot amputation. Patient adds that she 
 
has not been able to follow up with her primary care physician as she is only in the office 
on , and the weather has made it difficult to get into town. 
 
 Results: X-ray foot right 3+ views 
 Impression 
 Linear lucency/irregularity at the distal 1st phalanx, may represent 
 minimally displaced fracture. 
 Signed by: Oleksandr Lemus 
 Sign Date/Time: 2019 10:20 AM 
 
 Past Medical History 
 Diagnosis Date 
   Acute MI ([facility])  
  with stenting x 1 
   Anemia associated with chronic renal failure 2016 
   Atrial fibrillation ([facility])  
   Chronic kidney disease  
   Congestive heart failure ([facility])  
   COPD (chronic obstructive pulmonary disease) ([facility])  
   COPD (chronic obstructive pulmonary disease) ([facility]) 2018 
   Coronary artery disease  
  stent placements: 3 total 
   Depression  
   Diabetes other 
  abstracted 
   Diabetes mellitus, type 2 ([facility])  
   ESRD on peritoneal dialysis ([facility])  
   GERD (gastroesophageal reflux disease)  
   Hemodialysis patient ([facility]) 10/2016 
  Stopped peritoneal and restarted hemodialysis 
   Hemorrhage of gastrointestinal tract, unspecified 2017 
  low GI, rectal bleeding 
   High blood pressure other 
  abstracted 
   High cholesterol other 
  abstracted 
   Hyperlipidemia  
   Hypertension  
   Hyponatremia 2017 
   Myocardial infarction ([facility]) 2017 
   Other chronic pain  
  feet,  
   Pain of left hip joint 2016 
  due to hip fracture and replacement 
   Partial small bowel obstruction ([facility]) 2017 
  resolved 
   Renal failure  
  Stage 5.   Fistula in the JAN - not on dialysis yet. 
   Unspecified visual disturbance  
  glasses 
 
 Past Surgical History 
 Procedure Laterality Date 
   AV FISTULA PLACEMENT   
  left upper arm AV fistula - failed 
   AV FISTULA REPAIR N/A 10/25/2016 
  Procedure: AV FISTULA - GRAFT REPAIR/REVISION;  Surgeon: Kirt Mclaughlin MD;  Location: [facility]
IN OR;  Service: Vascular;  Laterality: N/A;  Superficialization and revision of left arm fi
stula 
   CARDIAC CATHETERIZATION  2017 
 
   CARPAL TUNNEL RELEASE Bilateral other 
  abstracted 
   CATARACT EXTRACTION Bilateral  
   CATHETER REMOVAL N/A 2016 
  Procedure: DIALYSIS CATHETER - REMOVAL;  Surgeon: Kirt Mclaughlin MD;  Location: [facility] OR; 
 Service: Vascular;  Laterality: N/A;  PD cath removal 
   CHOLECYSTECTOMY  other 
  abstracted 
   COLONOSCOPY   
   CORONARY ARTERY BYPASS GRAFT   
   CORONARY STENT PLACEMENT  2017 
  Drug Elutin stents to OM, 1 stent to prox. LAD 
   EYE SURGERY   
   FOOT AMPUTATION Left 2018 
  Procedure: FOREFOOT - AMPUTATION;  Surgeon: Srinivasan Jordan DPM;  Location: [facility] MAIN OR;
  Service: Podiatry;  Laterality: Left; 
   HARDWARE PRESENT   
  stent placements x 3, Left hip replacement, vascular stents 2 in left leg 
   HIP ARTHROPLASTY Left 2016 
  Procedure: HIP - ARTHROPLASTY(ALLISON);  Surgeon: Uriel Roa MD;  Location: [facility] MAIN 
OR;  Service: Orthopedics;  Laterality: Left; 
   HYSTERECTOMY   
  complete 
   PACEMAKER INSERTION   
  [employer] pacemaker/defib 
   PERITONEAL CATHETER INSERTION  8/15/2013 
   PERITONEAL CATHETER INSERTION N/A 2016 
  Procedure: LAPAROSCOPIC - PERITONEAL DIALYSIS CATH INSERTION;  Surgeon: Kirt Mclaughlin MD;  Lo
cation: [facility] MAIN OR;  Service: Vascular;  Laterality: N/A;  Removal of old catheter 
   SHOULDER SURGERY Right  
  frozen shoulder 
   UNLISTED PROCEDURE ARTHROSCOPY   
  total Left hip 
   vascular stents Left 2017 
  leg (2 stents) 
   VASCULAR SURGERY   
 
 Social History 
 Substance Use Topics 
   Smoking status: Former Smoker 
   Packs/day: 1.00 
   Years: 30.00 
   Types: Cigarettes 
   Quit date: 2004 
   Smokeless tobacco: Never Used 
    Comment: quite smoking  
   Alcohol use No 
 
 Family History 
 Problem Relation Age of Onset 
   High cholesterol Father  
   Hypertension Father  
   Diabetes Paternal Grandmother  
   Maljoann hypertherm Neg Hx  
   Kidney disease Neg Hx  
 
 Current Outpatient Prescriptions on File Prior to Visit 
 Medication Sig Dispense Refill 
   albuterol (PROVENTIL HFA;VENTOLIN HFA) 108 (90 Base) MCG/ACT inhaler Inhale 2 puffs int
o the lungs every 4 (four) hours as needed for Wheezing.   
 
   apixaban (ELIQUIS) 2.5 MG tablet Take 1 tablet by mouth 2 (two) times daily. 60 tablet 
0 
   atorvastatin (LIPITOR) 80 MG tablet Take 80 mg by mouth nightly.   
   carvedilol (COREG) 12.5 MG tablet Take 1 tablet by mouth 2 (two) times daily with meals
. 60 tablet 0 
   esomeprazole (NEXIUM) 40 MG capsule Take 1 capsule by mouth every day   
   HYDROcodone-acetaminophen (NORCO) 5-325 MG per tablet Take 1 tablet by mouth every 4 (f
our) hours as needed. 30 tablet 0 
   insulin aspart (NOVOLOG) 100 UNIT/ML injection Inject  into the skin 3 (three) times da
melina before meals. Sliding scale   
   insulin degludec (TRESIBA) 100 UNIT/ML injection Inject 21 units every night   
   isosorbide mononitrate (IMDUR) 60 MG 24 hr tablet Take 1 tablet by mouth daily. 30 tabl
et 1 
   LORazepam (ATIVAN) 1 MG tablet Take 0.5 tablets by mouth every 8 (eight) hours as neede
d for Anxiety. Patient states she has the 1mg tablets at home and that they are scored and s
he will split them in half   
   losartan (COZAAR) 100 MG tablet Take 100 mg by mouth daily.   
   nitroGLYCERIN (NITROSTAT) 0.4 MG SL tablet Place 1 tablet under the tongue every 5 (fiv
e) minutes as needed for Chest pain. 30 tablet 0 
   nortriptyline (PAMELOR) 25 MG capsule Take 25-75 mg by mouth See Admin Instructions. Ta
kes 25 mg by mouth every morning and 75 mg every night   
   ondansetron (ZOFRAN-ODT) 4 MG disintegrating tablet Take 4 mg by mouth every 8 (eight) 
hours as needed.   
   oxybutynin (DITROPAN) 5 MG tablet Take 7.5 mg by mouth 2 (two) times daily.   
   prochlorperazine 5 MG tablet TAKE ONE TABLET BY MOUTH TWICE DAILY AS NEEDED FOR NAUSEA 
60 tablet 11 
   rOPINIRole (REQUIP) 0.25 MG tablet Take 0.75 mg by mouth nightly.   
   TRINTELLIX 20 MG TABS Take 20 mg by mouth every evening.   
 
 No current facility-administered medications on file prior to visit.  
 
 Allergies 
 Allergen Reactions 
   Digitoxin Other (See Comments) 
   Toxic, patient states her eyes were also affected "she seen yellow everywhere" 
   Morphine Mental Changes, Other (See Comments) and Anxiety 
   "makes me feel jittery"
 Other reaction(s): MAKES HER "CRAZY"
 Hallucinations
 Make her crazy/ "funny feeling in my head"
 Has used hydromorphone in-house 
   Penicillin G Hives 
   Penicillins Rash and Hives 
   Other reaction(s): RASH
 Tolerates cephalosporins 
   Quinapril Hcl Anaphylaxis 
   Quinapril Hcl Angioedema 
   Facial Edema 
   Digoxin And Related Other (See Comments) 
   toxic 
   Metaxalone Other (See Comments) 
   Pantoprazole Sodium Nausea and Vomiting 
   Quinapril Hcl Edema 
   Facial Edema 
   Sudafed [Pseudoephedrine Hcl] Rash 
 
 Comprehensive ROS performed and pertinent items described in the HPI. 
 
 Vitals:reviewed
 CONSTITUTIONAL:  Conversant, well developed, NAD
 
 EYES: Anicteric sclerae, no lid drag, no proptosis
 RESP: Normal effort, regular, even, unlabored rate
 CV: No peripheral edema, rate regular
 SKIN: Pink, warm, dry without rash/lesion
 MS: ROM not limited, no digital cyanosis, normal gait
 NEURO: Cranial nerves II-XII grossly intact, A&O times 3
 PSYCH: appropriate affect, speech and tone, judgement and insight intact
 Vascular:  Left AV fistula with a strong palpable thrill.  Toe wound healing well.  
 
 Discussed with the patient that her toe appears to be healing and there is no need for vasc
ular intervention on her right lower extremity. Educated the patient that her finger pain is
 likely the result of her fistula diverting too much blood from her hand. I advised the sylvester
ent to do her best to prevent any injury to her hand, in order to prevent ulcerations. The p
atient will need a fistulogram in the near future, which she can call the office to schedule
. All questions and concerns addressed. Patient will follow up after a fistulogram. Patient 
understands and is agreeable. 
 
 Attending Note: Documentation assistance provided by Steven Mike (David). Information r
ecorded by the [email or web address] has been reviewed and validated by me. I agree with its contents.
 
 Signed by: David Chavis 19, 3:07 PM
 
 Kirt Mclaughlin MD
 
 in this encounter
 
 Plan of Treatment
 
 
+--------+---------+-----------+----------------------+-------------+
| Date   | Type    | Specialty | Care Team            | Description |
+--------+---------+-----------+----------------------+-------------+
| 04/10/ | Office  | Podiatry  |   Srinivasan Jordan,  |             |
| 2019   | Visit   |           | DPM  [address], |             |
|        |         |           |  [address]  [place],  |             |
|        |         |           | [place] [address]             |             |
|        |         |           | [phone]         |             |
|        |         |           | [phone] (Fax)   |             |
+--------+---------+-----------+----------------------+-------------+
 as of this encounter
 
 Visit Diagnoses
 
 
+------------------------------------------------------------------------+
| Diagnosis                                                              |
+------------------------------------------------------------------------+
|   Steal syndrome as complication of dialysis access, sequela - Primary |
+------------------------------------------------------------------------+
|   ESRD (end stage renal disease) (HCC)                                 |
+------------------------------------------------------------------------+
|   End stage renal disease                                              |
+------------------------------------------------------------------------+
|   PAD (peripheral artery disease) (HCC)                                |
+------------------------------------------------------------------------+
|   Unspecified disorders of arteries and arterioles                     |
+------------------------------------------------------------------------+

## 2019-04-03 NOTE — XMS
Encounter Summary
  Created on: 2019
 
 Cherie Villalobos
 External Reference #: [id number]
 : 48
 Sex: Female
 
 Demographics
 
 
+-----------------------+--------------------------+
| Address               | [address]               |
|                       | [place]  [address] |
+-----------------------+--------------------------+
| Home Phone            | [phone]          |
+-----------------------+--------------------------+
| Preferred Language    | Unknown                  |
+-----------------------+--------------------------+
| Marital Status        |                   |
+-----------------------+--------------------------+
| Nondenominational Affiliation | 1013                     |
+-----------------------+--------------------------+
| Race                  | Unknown                  |
+-----------------------+--------------------------+
| Ethnic Group          | Unknown                  |
+-----------------------+--------------------------+
 
 
 Author
 
 
+--------------+-----------------------+
| Author       | Sherry Archevos |
+--------------+-----------------------+
| Organization | [facility] |
+--------------+-----------------------+
| Address      | Unknown               |
+--------------+-----------------------+
| Phone        | Unavailable           |
+--------------+-----------------------+
 
 
 
 Support
 
 
+---------------+--------------+---------------------+-----------------+
| Name          | Relationship | Address             | Phone           |
+---------------+--------------+---------------------+-----------------+
| Anopo Villalobos | ECON         | [place], | [phone] |
|               |              |  [place]  [address]     |                 |
+---------------+--------------+---------------------+-----------------+
| Jennifer Dickson | ECON         | [place]       | [phone] |
|               |              | [address]               |                 |
+---------------+--------------+---------------------+-----------------+
 
 
 
 
 Care Team Providers
 
 
+-----------------------+------+-----------------+
| Care Team Member Name | Role | Phone           |
+-----------------------+------+-----------------+
| Ivy Couch DO      | PCP  | [phone] |
+-----------------------+------+-----------------+
 
 
 
 Reason for Visit
 
 
+-----------+----------------------+
| Reason    | Comments             |
+-----------+----------------------+
| Follow-up | schedule appointment |
+-----------+----------------------+
 
 
 
 Encounter Details
 
 
+--------+-----------+----------------------+----------------------+----------------------+
| Date   | Type      | Department           | Care Team            | Description          |
+--------+-----------+----------------------+----------------------+----------------------+
| / | Telephone |   [facility] |   Srinivasan Jordan,  | Follow-up (schedule  |
| 2019   |           |  [address] Ankle  [address]      | DPM  [address], | appointment)         |
|        |           | [address]   |  [address]  [place],  |                      |
|        |           | [place], [place]         | [place] [address]             |                      |
|        |           | [address]           | [phone]         |                      |
|        |           | [phone]         | [phone] (Fax)   |                      |
+--------+-----------+----------------------+----------------------+----------------------+
 
 
 
 Social History
 
 
+---------------+------------+-----------+--------+------------------+
| Tobacco Use   | Types      | Packs/Day | Years  | Date             |
|               |            |           | Used   |                  |
+---------------+------------+-----------+--------+------------------+
| Former Smoker | Cigarettes | 1         | 30     | Quit: 2004 |
+---------------+------------+-----------+--------+------------------+
 
 
 
+---------------------+---+---+---+
| Smokeless Tobacco:  |   |   |   |
| Never Used          |   |   |   |
+---------------------+---+---+---+
 
 
 
+------------------------------+
| Comments: quite smoking  |
 
+------------------------------+
 
 
 
+-------------+-----------+---------+----------+
| Alcohol Use | Drinks/We | oz/Week | Comments |
|             | ek        |         |          |
+-------------+-----------+---------+----------+
| No          |           |         |          |
+-------------+-----------+---------+----------+
 
 
 
+------------------+---------------+
| Sex Assigned at  | Date Recorded |
| Birth            |               |
+------------------+---------------+
| Not on file      |               |
+------------------+---------------+
 as of this encounter
 
 Plan of Treatment
 
 
+--------+---------+-----------+----------------------+-------------+
| Date   | Type    | Specialty | Care Team            | Description |
+--------+---------+-----------+----------------------+-------------+
| 04/10/ | Office  | Podiatry  |   Srinivasan Jordan,  |             |
| 2019   | Visit   |           | DPCLARE  [address], |             |
|        |         |           |  [address]  [place],  |             |
|        |         |           | [place] [address]             |             |
|        |         |           | [phone]         |             |
|        |         |           | [phone] (Fax)   |             |
+--------+---------+-----------+----------------------+-------------+
 as of this encounter
 
 Visit Diagnoses
 Not on filein this encounter

## 2019-04-03 NOTE — XMS
Encounter Summary
  Created on: 2019
 
 Cherie Villalobos
 External Reference #: [id number]
 : 48
 Sex: Female
 
 Demographics
 
 
+-----------------------+--------------------------+
| Address               | [address]               |
|                       | [place]  [address] |
+-----------------------+--------------------------+
| Home Phone            | [phone]          |
+-----------------------+--------------------------+
| Preferred Language    | Unknown                  |
+-----------------------+--------------------------+
| Marital Status        |                   |
+-----------------------+--------------------------+
| Congregation Affiliation | 1013                     |
+-----------------------+--------------------------+
| Race                  | Unknown                  |
+-----------------------+--------------------------+
| Ethnic Group          | Unknown                  |
+-----------------------+--------------------------+
 
 
 Author
 
 
+--------------+-----------------------+
| Author       | [facility] |
+--------------+-----------------------+
| Organization | [facility] |
+--------------+-----------------------+
| Address      | Unknown               |
+--------------+-----------------------+
| Phone        | Unavailable           |
+--------------+-----------------------+
 
 
 
 Support
 
 
+---------------+--------------+---------------------+-----------------+
| Name          | Relationship | Address             | Phone           |
+---------------+--------------+---------------------+-----------------+
| Anoop Villalobos | ECON         | [place], | [phone] |
|               |              |  [place]  [address]     |                 |
+---------------+--------------+---------------------+-----------------+
| Jennifer Dickson | ECON         | [place]       | [phone] |
|               |              | [phone]               |                 |
+---------------+--------------+---------------------+-----------------+
 
 
 
 
 Care Team Providers
 
 
+-----------------------+------+-----------------+
| Care Team Member Name | Role | Phone           |
+-----------------------+------+-----------------+
| Ivy Couch DO      | PCP  | [phone] |
+-----------------------+------+-----------------+
 
 
 
 Reason for Visit
 
 
+--------------------+------------------------------+
| Reason             | Comments                     |
+--------------------+------------------------------+
| Medication Problem | Requesting Rx for Wheelchair |
+--------------------+------------------------------+
 
 
 
 Encounter Details
 
 
+--------+-----------+----------------------+----------------------+---------------------+
| Date   | Type      | Department           | Care Team            | Description         |
+--------+-----------+----------------------+----------------------+---------------------+
| / | Telephone |   [facility] |   Srinivasan Jordan,  | Medication Problem  |
| 2019   |           |  and Ankle  [address]      | DPM  [address] [facility], | (Requesting Rx for  |
|        |           | [address]   |  [address]  [place],  | Wheelchair)         |
|        |           | [place], [place]         | [place] [address]             |                     |
|        |           | [address]           | [phone]         |                     |
|        |           | [phone]         | [phone] (Fax)   |                     |
+--------+-----------+----------------------+----------------------+---------------------+
 
 
 
 Social History
 
 
+---------------+------------+-----------+--------+------------------+
| Tobacco Use   | Types      | Packs/Day | Years  | Date             |
|               |            |           | Used   |                  |
+---------------+------------+-----------+--------+------------------+
| Former Smoker | Cigarettes | 1         | 30     | Quit: 2004 |
+---------------+------------+-----------+--------+------------------+
 
 
 
+---------------------+---+---+---+
| Smokeless Tobacco:  |   |   |   |
| Never Used          |   |   |   |
+---------------------+---+---+---+
 
 
 
+------------------------------+
| Comments: quite smoking  |
 
+------------------------------+
 
 
 
+-------------+-----------+---------+----------+
| Alcohol Use | Drinks/We | oz/Week | Comments |
|             | ek        |         |          |
+-------------+-----------+---------+----------+
| No          |           |         |          |
+-------------+-----------+---------+----------+
 
 
 
+------------------+---------------+
| Sex Assigned at  | Date Recorded |
| Birth            |               |
+------------------+---------------+
| Not on file      |               |
+------------------+---------------+
 as of this encounter
 
 Plan of Treatment
 
 
+--------+---------+-----------+----------------------+-------------+
| Date   | Type    | Specialty | Care Team            | Description |
+--------+---------+-----------+----------------------+-------------+
| 04/10/ | Office  | Podiatry  |   Srinivasan Jordan,  |             |
|    | Visit   |           | DPM  [address], |             |
|        |         |           |  [address]  [place],  |             |
|        |         |           | [place] [address]             |             |
|        |         |           | [phone]         |             |
|        |         |           | [phone] (Fax)   |             |
+--------+---------+-----------+----------------------+-------------+
 as of this encounter
 
 Visit Diagnoses
 Not on filein this encounter

## 2019-04-03 NOTE — XMS
Encounter Summary
  Created on: 2019
 
 Cherie Villalobos
 External Reference #: [id number]
 : 48
 Sex: Female
 
 Demographics
 
 
+-----------------------+--------------------------+
| Address               | [address]               |
|                       | [place]  [address] |
+-----------------------+--------------------------+
| Home Phone            | [phone]          |
+-----------------------+--------------------------+
| Preferred Language    | Unknown                  |
+-----------------------+--------------------------+
| Marital Status        |                   |
+-----------------------+--------------------------+
| [employer] Affiliation | 1013                     |
+-----------------------+--------------------------+
| Race                  | Unknown                  |
+-----------------------+--------------------------+
| Ethnic Group          | Unknown                  |
+-----------------------+--------------------------+
 
 
 Author
 
 
+--------------+--------------------------------------------+
| Author       | [facility]  |
|              | [facility] [place]                                |
+--------------+--------------------------------------------+
| Organization | [facility]  |
|              | [facility] [place]                                |
+--------------+--------------------------------------------+
| Address      | Unknown                                    |
+--------------+--------------------------------------------+
| Phone        | Unavailable                                |
+--------------+--------------------------------------------+
 
 
 
 Support
 
 
+---------------+--------------+---------------------+-----------------+
| Name          | Relationship | Address             | Phone           |
+---------------+--------------+---------------------+-----------------+
| Anoop Villalobos | ECON         | [address], | [phone] |
|               |              |  [place]  [address]     |                 |
+---------------+--------------+---------------------+-----------------+
| Jennifer Dickson | ECON         | [place], [place]       | [phone] |
|               |              | [phone]               |                 |
+---------------+--------------+---------------------+-----------------+
 
 
 
 
 Care Team Providers
 
 
+--------------------------+------+-----------------+
| Care Team Member Name    | Role | Phone           |
+--------------------------+------+-----------------+
| Araseli Montelongo Activity  | PCP  | [phone] |
| Professional             |      |                 |
+--------------------------+------+-----------------+
 
 
 
 Reason for Visit
 
 
+--------+------------------------------------+
| Reason | Comments                           |
+--------+------------------------------------+
| Other  | medication changed due to ER visit |
+--------+------------------------------------+
 
 
 
 Encounter Details
 
 
+--------+-----------+----------------------+----------------------+--------------------+
| Date   | Type      | Department           | Care Team            | Description        |
+--------+-----------+----------------------+----------------------+--------------------+
| / | Telephone |   [facility]          |   Johnie John, | Other (medication  |
| 2019   |           | CARDIOLOGY  [address]    |  MD  [address] | changed due to ER  |
|        |           | [address]  [place], |  [place],   | visit)             |
|        |           |  [place] [address]       | [place] [address]             |                    |
|        |           | [phone]         | [phone]         |                    |
|        |           |                      | [phone] (Fax)   |                    |
+--------+-----------+----------------------+----------------------+--------------------+
 
 
 
 Social History
 
 
+---------------+------------+-----------+--------+------------------+
| Tobacco Use   | Types      | Packs/Day | Years  | Date             |
|               |            |           | Used   |                  |
+---------------+------------+-----------+--------+------------------+
| Former Smoker | Cigarettes | 1         | 30     | Quit: 2004 |
+---------------+------------+-----------+--------+------------------+
 
 
 
+---------------------+---+---+---+
| Smokeless Tobacco:  |   |   |   |
| Never Used          |   |   |   |
+---------------------+---+---+---+
 
 
 
 
+-------------+-----------+---------+----------+
| Alcohol Use | Drinks/We | oz/Week | Comments |
|             | ek        |         |          |
+-------------+-----------+---------+----------+
| No          |           |         |          |
+-------------+-----------+---------+----------+
 
 
 
+------------------+---------------+
| Sex Assigned at  | Date Recorded |
| Birth            |               |
+------------------+---------------+
| Not on file      |               |
+------------------+---------------+
 as of this encounter
 
 Functional Status
 
 
+---------------------------------------------+----------+--------------------+
| Functional Status                           | Response | Date of Assessment |
+---------------------------------------------+----------+--------------------+
| Are you deaf or do you have serious         | No       | 2018         |
| difficulty hearing?                         |          |                    |
+---------------------------------------------+----------+--------------------+
| Are you blind or do you have serious        | No       | 2018         |
| difficulty seeing, even when wearing        |          |                    |
| glasses?                                    |          |                    |
+---------------------------------------------+----------+--------------------+
| Do you have serious difficulty walking or   | No       | 2018         |
| climbing stairs? (5 years old or older)     |          |                    |
+---------------------------------------------+----------+--------------------+
| Do you have difficulty dressing or bathing? | No       | 2018         |
|  (5 years old or older)                     |          |                    |
+---------------------------------------------+----------+--------------------+
| Because of a physical, mental, or emotional | No       | 2018         |
|  condition, do you have difficulty doing    |          |                    |
| errands alone such as visiting a doctor's   |          |                    |
| office or shopping? [15 years old or        |          |                    |
| older)]                                     |          |                    |
+---------------------------------------------+----------+--------------------+
 
 
 
+---------------------------------------------+----------+--------------------+
| Cognitive Status                            | Response | Date of Assessment |
+---------------------------------------------+----------+--------------------+
| Because of a physical, mental, or emotional | No       | 2018         |
|  condition, do you have serious difficulty  |          |                    |
| concentrating, remembering, or making       |          |                    |
| decisions? (5 years old or older)           |          |                    |
+---------------------------------------------+----------+--------------------+
 as of this encounter
 
 Plan of Treatment
 
 
+--------+---------+------------+----------------------+-------------+
 
| Date   | Type    | Specialty  | Care Team            | Description |
+--------+---------+------------+----------------------+-------------+
| / | Office  | Cardiology |   Johnie John, |             |
| 2019   | Visit   |            |  MD  [address] |             |
|        |         |            |  [place],   |             |
|        |         |            | [place] [address]             |             |
|        |         |            | [phone]         |             |
|        |         |            | [phone] (Fax)   |             |
+--------+---------+------------+----------------------+-------------+
 as of this encounter
 
 Visit Diagnoses
 Not on filein this encounter

## 2019-04-03 NOTE — XMS
Encounter Summary
  Created on: 2019
 
 Cherie Villalobos
 External Reference #: [id number]
 : 48
 Sex: Female
 
 Demographics
 
 
+-----------------------+--------------------------+
| Address               | [address]               |
|                       | [place]  [address] |
+-----------------------+--------------------------+
| Home Phone            | [phone]          |
+-----------------------+--------------------------+
| Preferred Language    | Unknown                  |
+-----------------------+--------------------------+
| Marital Status        |                   |
+-----------------------+--------------------------+
| Anabaptist Affiliation | [id number]                     |
+-----------------------+--------------------------+
| Race                  | Unknown                  |
+-----------------------+--------------------------+
| Ethnic Group          | Unknown                  |
+-----------------------+--------------------------+
 
 
 Author
 
 
+--------------+-----------------------+
| Author       | [employer] |
+--------------+-----------------------+
| Organization | [facility] |
+--------------+-----------------------+
| Address      | Unknown               |
+--------------+-----------------------+
| Phone        | Unavailable           |
+--------------+-----------------------+
 
 
 
 Support
 
 
+---------------+--------------+---------------------+-----------------+
| Name          | Relationship | Address             | Phone           |
+---------------+--------------+---------------------+-----------------+
| Anoop Villalobos | ECON         | [place], | [phone] |
|               |              |  [place]  [address]     |                 |
+---------------+--------------+---------------------+-----------------+
| Jennifer Dickson | ECON         | [place]       | [phone] |
|               |              | [phone]               |                 |
+---------------+--------------+---------------------+-----------------+
 
 
 
 
 Care Team Providers
 
 
+-----------------------+------+-----------------+
| Care Team Member Name | Role | Phone           |
+-----------------------+------+-----------------+
| Ivy Couch DO      | PCP  | [phone] |
+-----------------------+------+-----------------+
 
 
 
 Reason for Visit
 
 
+---------------------+----------+
| Reason              | Comments |
+---------------------+----------+
| Multiple Falls      |          |
+---------------------+----------+
| Circulatory Problem |          |
+---------------------+----------+
| Referral            |          |
+---------------------+----------+
 
 
 
 Encounter Details
 
 
+--------+-----------+----------------------+----------------------+-------------+
| Date   | Type      | Department           | Care Team            | Description |
+--------+-----------+----------------------+----------------------+-------------+
| / | Emergency |   [facility]    |   Chau Deleon,  |             |
|    |           | [facility]       | MD [address]   |             |
|        |           | Emergency Department | EMERGENCY DEPARTMENT |             |
|        |           |   [address]     |   [place] [address] |             |
|        |           | [place] [address]   |   [phone]       |             |
|        |           | [phone]         | [phone] (Fax)   |             |
+--------+-----------+----------------------+----------------------+-------------+
 
 
 
 Social History
 
 
+---------------+------------+-----------+--------+------------------+
| Tobacco Use   | Types      | Packs/Day | Years  | Date             |
|               |            |           | Used   |                  |
+---------------+------------+-----------+--------+------------------+
| Former Smoker | Cigarettes | 1         | 30     | Quit: 2004 |
+---------------+------------+-----------+--------+------------------+
 
 
 
+---------------------+---+---+---+
| Smokeless Tobacco:  |   |   |   |
| Never Used          |   |   |   |
+---------------------+---+---+---+
 
 
 
 
+------------------------------+
| Comments: quite smoking  |
+------------------------------+
 
 
 
+-------------+-----------+---------+----------+
| Alcohol Use | Drinks/We | oz/Week | Comments |
|             | ek        |         |          |
+-------------+-----------+---------+----------+
| No          |           |         |          |
+-------------+-----------+---------+----------+
 
 
 
+------------------+---------------+
| Sex Assigned at  | Date Recorded |
| Birth            |               |
+------------------+---------------+
| Not on file      |               |
+------------------+---------------+
 as of this encounter
 
 Last Filed Vital Signs
 
 
+-------------------+----------------------+-------------------------+
| Vital Sign        | Reading              | Time Taken              |
+-------------------+----------------------+-------------------------+
| Blood Pressure    | 137/63               | 2019  4:05 PM PST |
+-------------------+----------------------+-------------------------+
| Pulse             | 87                   | 2019  4:05 PM PST |
+-------------------+----------------------+-------------------------+
| Temperature       | 36.3   C (97.3   F)  | 2019  4:05 PM PST |
+-------------------+----------------------+-------------------------+
| Respiratory Rate  | 16                   | 2019  4:05 PM PST |
+-------------------+----------------------+-------------------------+
| Oxygen Saturation | -                    | -                       |
+-------------------+----------------------+-------------------------+
| Inhaled Oxygen    | -                    | -                       |
| Concentration     |                      |                         |
+-------------------+----------------------+-------------------------+
| Weight            | 65.6 kg (144 lb 11.7 | 2019  4:05 PM PST |
|                   |  oz)                 |                         |
+-------------------+----------------------+-------------------------+
| Height            | -                    | -                       |
+-------------------+----------------------+-------------------------+
| Body Mass Index   | 20.77                | 2019  4:05 PM PST |
+-------------------+----------------------+-------------------------+
 in this encounter
 
 Medications at Time of Discharge
 
 
+----------------------+----------------------+--------+---------+----------+-----------+
| Medication           | Sig.                 | Disp.  | Refills | Start    | End Date  |
|                      |                      |        |         | Date     |           |
+----------------------+----------------------+--------+---------+----------+-----------+
 
|   albuterol          | Inhale 2 puffs into  |        |         |          |           |
| (PROVENTIL           | the lungs every 4    |        |         |          |           |
| HFA;VENTOLIN HFA)    | (four) hours as      |        |         |          |           |
| 108 (90 Base)        | needed for Wheezing. |        |         |          |           |
| MCG/ACT              |                      |        |         |          |           |
| inhalerIndications:  |                      |        |         |          |           |
| states uses 2x       |                      |        |         |          |           |
| monthly average      |                      |        |         |          |           |
+----------------------+----------------------+--------+---------+----------+-----------+
|   apixaban (ELIQUIS) | Take 1 tablet by     |   60   | 0       | 20 |           |
|  2.5 MG tablet       | mouth 2 (two) times  | tablet |         | 18       |           |
|                      | daily.               |        |         |          |           |
+----------------------+----------------------+--------+---------+----------+-----------+
|   atorvastatin       | Take 80 mg by mouth  |        |         | 20 |           |
| (LIPITOR) 80 MG      | nightly.             |        |         | 19       |           |
| tablet               |                      |        |         |          |           |
+----------------------+----------------------+--------+---------+----------+-----------+
|   carvedilol (COREG) | Take 1 tablet by     |   60   | 0       | 20 |  |
|  12.5 MG tablet      | mouth 2 (two) times  | tablet |         | 19       | 0         |
|                      | daily with meals.    |        |         |          |           |
+----------------------+----------------------+--------+---------+----------+-----------+
|   esomeprazole       | Take 1 capsule by    |        |         |          |           |
| (NEXIUM) 40 MG       | mouth every day      |        |         |          |           |
| capsule              |                      |        |         |          |           |
+----------------------+----------------------+--------+---------+----------+-----------+
|                      | Take 1 tablet by     |   30   | 0       | 20 |           |
| HYDROcodone-acetamin | mouth every 4 (four) | tablet |         | 19       |           |
| ophen (NORCO) 5-325  |  hours as needed.    |        |         |          |           |
| MG per tablet        |                      |        |         |          |           |
+----------------------+----------------------+--------+---------+----------+-----------+
|   insulin aspart     | Inject  into the     |        |         |          |           |
| (NOVOLOG) 100        | skin 3 (three) times |        |         |          |           |
| UNIT/ML injection    |  daily before meals. |        |         |          |           |
|                      |  Sliding scale       |        |         |          |           |
+----------------------+----------------------+--------+---------+----------+-----------+
|   insulin degludec   | Inject 21 units      |        |         |          |           |
| (TRESIBA) 100        | every night          |        |         |          |           |
| UNIT/ML injection    |                      |        |         |          |           |
+----------------------+----------------------+--------+---------+----------+-----------+
|   isosorbide         | Take 1 tablet by     |   30   | 1       | 20 |  |
| mononitrate (IMDUR)  | mouth daily.         | tablet |         | 18       | 9         |
| 60 MG 24 hr tablet   |                      |        |         |          |           |
+----------------------+----------------------+--------+---------+----------+-----------+
|   losartan (COZAAR)  | Take 100 mg by mouth |        |         | 20 |           |
| 100 MG tablet        |  daily.              |        |         | 19       |           |
+----------------------+----------------------+--------+---------+----------+-----------+
|   nitroGLYCERIN      | Place 1 tablet under |   30   | 0       | 20 |  |
| (NITROSTAT) 0.4 MG   |  the tongue every 5  | tablet |         | 19       | 0         |
| SL tablet            | (five) minutes as    |        |         |          |           |
|                      | needed for Chest     |        |         |          |           |
|                      | pain.                |        |         |          |           |
+----------------------+----------------------+--------+---------+----------+-----------+
|   nortriptyline      | Take 25-75 mg by     |        |         |          |           |
| (PAMELOR) 25 MG      | mouth See Admin      |        |         |          |           |
| capsule              | Instructions. Takes  |        |         |          |           |
|                      | 25 mg by mouth every |        |         |          |           |
|                      |  morning and 75 mg   |        |         |          |           |
|                      | every night          |        |         |          |           |
+----------------------+----------------------+--------+---------+----------+-----------+
|   ondansetron        | Take 4 mg by mouth   |        |         | 20 |           |
 
| (ZOFRAN-ODT) 4 MG    | every 8 (eight)      |        |         | 18       |           |
| disintegrating       | hours as needed.     |        |         |          |           |
| tablet               |                      |        |         |          |           |
+----------------------+----------------------+--------+---------+----------+-----------+
|   oxybutynin         | Take 7.5 mg by mouth |        |         |          |           |
| (DITROPAN) 5 MG      |  2 (two) times       |        |         |          |           |
| tablet               | daily.               |        |         |          |           |
+----------------------+----------------------+--------+---------+----------+-----------+
|   prochlorperazine 5 | TAKE ONE TABLET BY   |   60   | 11      | 20 |           |
|  MG tablet           | MOUTH TWICE DAILY AS | tablet |         | 19       |           |
|                      |  NEEDED FOR NAUSEA   |        |         |          |           |
+----------------------+----------------------+--------+---------+----------+-----------+
|   rOPINIRole         | Take 0.75 mg by      |        |         |          |           |
| (REQUIP) 0.25 MG     | mouth nightly.       |        |         |          |           |
| tablet               |                      |        |         |          |           |
+----------------------+----------------------+--------+---------+----------+-----------+
|   TRINTELLIX 20 MG   | Take 20 mg by mouth  |        |         | 20 |           |
| TABS                 | every evening.       |        |         | 19       |           |
+----------------------+----------------------+--------+---------+----------+-----------+
|   albuterol          | Ventolin HFA 90      |        |         |          |  |
| (VENTOLIN HFA) 108   | mcg/actuation        |        |         |          | 9         |
| (90 Base) MCG/ACT    | aerosol inhaler      |        |         |          |           |
| inhaler              | Inhale 2 puffs every |        |         |          |           |
|                      |  4 hours by          |        |         |          |           |
|                      | inhalation route as  |        |         |          |           |
|                      | needed.              |        |         |          |           |
+----------------------+----------------------+--------+---------+----------+-----------+
|   atorvastatin       | Take 1 tablet by     |   30   | 0       | 20 |  |
| (LIPITOR) 40 MG      | mouth nightly.       | tablet |         | 19       | 9         |
| tablet               |                      |        |         |          |           |
+----------------------+----------------------+--------+---------+----------+-----------+
|   bisacodyl          | Place 10 mg          |        |         |          |  |
| (DULCOLAX) 10 MG     | rectally.            |        |         |          | 9         |
| suppository          |                      |        |         |          |           |
+----------------------+----------------------+--------+---------+----------+-----------+
|   calcium acetate    | 667 mg 3 (three)     |        |         | 20 |  |
| (PHOSLO) 667 MG      | times daily with     |        |         | 16       | 9         |
| capsule              | meals.               |        |         |          |           |
+----------------------+----------------------+--------+---------+----------+-----------+
|   Cholecalciferol    | Take 5,000 capsules  |        |         |          |  |
| (VITAMIN D3) 5000    | by mouth every other |        |         |          | 9         |
| UNITS CAPS           |  day.                |        |         |          |           |
+----------------------+----------------------+--------+---------+----------+-----------+
|   clopidogrel        | Take 1 tablet by     |   30   | 11      | 20 |  |
| (PLAVIX) 75 MG       | mouth daily.         | tablet |         | 18       | 9         |
| tablet               |                      |        |         |          |           |
+----------------------+----------------------+--------+---------+----------+-----------+
|   loperamide         | 2 mg as needed.      |        |         |          |  |
| (IMODIUM) 2 MG       |                      |        |         |          | 9         |
| capsule              |                      |        |         |          |           |
+----------------------+----------------------+--------+---------+----------+-----------+
|   LORazepam (ATIVAN) | Take 1 tablet by     |   30   | 0       | 20 | 02/15/201 |
|  1 MG tablet         | mouth every 8        | tablet |         | 19       | 9         |
|                      | (eight) hours as     |        |         |          |           |
|                      | needed for Anxiety.  |        |         |          |           |
+----------------------+----------------------+--------+---------+----------+-----------+
|   Magnesium          | Take 1 tablet by     |        |         |          |  |
| Cl-Calcium Carbonate | mouth every other    |        |         |          | 9         |
|  (SLOW-MAG) 71.5-119 | day.                 |        |         |          |           |
|  MG TBEC             |                      |        |         |          |           |
 
+----------------------+----------------------+--------+---------+----------+-----------+
|   ranitidine         | ranitidine 150 mg    |        |         |          |  |
| (ZANTAC) 150 MG      | tablet Take 1 tablet |        |         |          | 9         |
| tablet               |  twice a day by oral |        |         |          |           |
|                      |  route.              |        |         |          |           |
+----------------------+----------------------+--------+---------+----------+-----------+
|   Vortioxetine HBr   | 10 mg nightly.       |        |         |          |  |
| 10 MG TABS           |                      |        |         |          | 9         |
+----------------------+----------------------+--------+---------+----------+-----------+
 as of this encounter
 
 Plan of Treatment
 
 
+--------+---------+-----------+----------------------+-------------+
| Date   | Type    | Specialty | Care Team            | Description |
+--------+---------+-----------+----------------------+-------------+
| 04/10/ | Office  | Podiatry  |   Srinivasan Jordan,  |             |
| 2019   | Visit   |           | DPM  [address], |             |
|        |         |           |  [address]  [place],  |             |
|        |         |           | [place] [address]             |             |
|        |         |           | [phone]         |             |
|        |         |           | [phone] (Fax)   |             |
+--------+---------+-----------+----------------------+-------------+
 as of this encounter
 
 Procedures
 
 
+-----------------+--------+-------------+----------------------+----------------------+
| Procedure Name  | Priori | Date/Time   | Associated Diagnosis | Comments             |
|                 | ty     |             |                      |                      |
+-----------------+--------+-------------+----------------------+----------------------+
| ED INFORMATION  | Routin | 2019  |                      |   Results for this   |
| EXCHANGE        | e      |  3:58 PM    |                      | procedure are in the |
|                 |        | PST         |                      |  results section.    |
+-----------------+--------+-------------+----------------------+----------------------+
 in this encounter
 
 Results
 ED INFORMATION EXCHANGE (2019  3:58 PM)
 
+------------------------------------------------------------------------+--------------+
| Narrative                                                              | Performed At |
+------------------------------------------------------------------------+--------------+
|   [id number]     Criteria Met         Care        |   ED         |
| Guidelines      10 in      Security and Safety  No recent Security   | INFORMATION  |
| Events currently on file     ED Care Guidelines from [facility] -        | EXCHANGE     |
| [place]  Last Updated: 18 10:16 AM         Other Information:    |              |
| Currently being seen by [facility] in [place] for mental health        |              |
| [phone]  These are guidelines and the provider should    |              |
| exercise clinical judgment when providing care.  ED Care Guidelines    |              |
| from [facility]  Last Updated: 17 10:44 AM         Care |              |
|  Coordination:  This patient has been identified as having at          |              |
| leastEmergency Departments visits in the 12 months immediately         |              |
| preceding the date these guidelines were entered.Patient requires      |              |
| education on appropriate ED usage.Emphasize the importance of using    |              |
| outpatient   medical services for the treatment of chronic conditions. |              |
|   Please contact Community Health WorkerWillard at [phone] if   |              |
| patient is seen in ED.  These are guidelines and the provider should   |              |
 
| exercise clinical judgment when providing care.  These are guidelines  |              |
| and the provider should exercise clinical judgment when providing      |              |
| care.           Prescription Drug Report (12 Mo.)  PDMP query found no |              |
|  report.        E.D. Visit Count (12 mo.)  Facility Visits Low Acuity  |              |
|   [facility] 18 0   [facility] 2 0   [facility]   |              |
| [facility] 4 0   Total 24 0   Note: Visits indicate total |              |
|  known visits. Medicaid Low Acuity Dx are the number of primary        |              |
| diagnoses on the Medicaid's Low Acuity dx list.         Recent         |              |
| Emergency Department Visit Summary  Showing 10 most recent visits out  |              |
| of 24 in the past 12 months  Date [facility] State Type Diagnoses   |              |
| or Chief Complaint   2019 [facility] [place]       |              |
| Emergency          Multiple Falls        Referral        Circulatory   |              |
| Problem      2019 [facility] [place] Emergency      |              |
|      ABD PAIN        Other chest pain      2019 [facility]         |              |
| [facility] [place] Emergency          Foot Pain      2019 |              |
[facility] [place] [place] [facility]          Chest Pain          |              |
| Nausea        Shortness of Breath        Chest pain, unspecified       |              |
|      Elevated blood-pressure reading, without diagnosis of             |              |
| hypertension        Presence of aortocoronary bypass graft             |              |
| Other specified postprocedural states      2019 [facility]  |              |
| [facility] [place] Emergency          CHEST PAIN        Abnormal levels  |              |
| of other serum enzymes        Personal history of other diseases of    |              |
| the circulatory system        Chest pain, unspecified      2019 |              |
|  [facility] [place] Emergency          FISTULA BLEEDING    |              |
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
| grafts, initial encounter      Dec 22, 2018 [facility]       |              |
| [place] Emergency          CHEST PAIN        Type 2 diabetes         |              |
| mellitus with hyperglycemia        Chest pain, unspecified      Nov    |              |
| 2018 [facility] [place] Emergency    Chief Complaint:   |              |
| SOB      2018 [facility] [place] [place] [facility]         |              |
|     FALL        Unspecified fall, initial encounter        Dependence  |              |
| on renal dialysis        End stage renal disease      2018     |              |
| [facility] [place] [place] [facility]          FALL                 |              |
| Essential (primary) hypertension        Type 2 diabetes mellitus with  |              |
| hyperglycemia        Type 2 diabetes mellitus with unspecified         |              |
| complications        Unspecified fall, initial encounter               |              |
| Headache            Recent Inpatient Visit Summary  Showing 10 most    |              |
| recent visits out of 11 in the past 12 months  Date [facility]      |              |
| State Type Diagnoses or Chief Complaint   2019 [facility] |              |
|  [place] Vascular Surgery          Foot Pain        End stage   |              |
| renal disease        Dependence on renal dialysis        Peripheral    |              |
| vascular disease, unspecified        Anemia in chronic kidney disease  |              |
|        Other disorders of plasma-protein metabolism, not elsewhere     |              |
| classified        Other specified personal risk factors, not elsewhere |              |
|  classified      [facility] [place]           |              |
| Recovery          Peripheral arterial disease, osteomyelitis left foot |              |
|         Other acute osteomyelitis, left ankle and foot        Long     |              |
| term (current) use of antibiotics        End stage renal disease       |              |
|      Anemia in chronic kidney disease      Dec 5, 2018 [facility] |              |
|  [facility] [place] [facility]          Peripheral vascular disease, |              |
|  unspecified        End stage renal disease        Dependence on renal |              |
|  dialysis        Long term (current) use of insulin        Other       |              |
| chronic osteomyelitis, left ankle and foot        Type 2 diabetes      |              |
| mellitus with diabetic chronic kidney disease        Essential         |              |
| (primary) hypertension      [facility] [place]   |              |
| [place] In[facility]          Upper GIB        Other chronic osteomyelitis,    |              |
| unspecified ankle and foot        End stage renal disease        Other |              |
|  specified personal risk factors, not elsewhere classified             |              |
| Chronic systolic (congestive) heart failure        Anemia in chronic   |              |
| kidney disease        Other disorders of plasma-protein metabolism,    |              |
 
| not elsewhere classified        Moderate protein-calorie malnutrition  |              |
|        Other disorders of phosphorus metabolism      2018      |              |
| [place]. [place] Medical Surgical          1. Type 2      |              |
| diabetes mellitus with other specified complication        2. Urinary  |              |
| tract infection, site not specified        3. Unspecified              |              |
| protein-calorie malnutrition        4. Other chronic osteomyelitis,    |              |
| unspecified site        5. Hypertensive heart and chronic kidney       |              |
| disease with heart failure and with stage 5 chronic kidney disease, or |              |
|  end stage renal disease        6. Chronic diastolic (congestive)      |              |
| heart failure        7. Other osteomyelitis, ankle and foot        8.  |              |
| Type 2 diabetes mellitus with foot ulcer        9. Atherosclerotic     |              |
| heart disease of native coronary artery without angina pectoris        |              |
|  10. Chronic obstructive pulmonary disease, unspecified      ,    |              |
|  [facility] [place] [facility]          1. Benign |              |
|  paroxysmal vertigo, unspecified ear        2. End stage renal disease |              |
|         3. Hypertensive chronic kidney disease with stage 5 chronic    |              |
| kidney disease or end stage renal disease        4. Urinary tract      |              |
| infection, site not specified        5. Hypertensive heart and chronic |              |
|  kidney disease with heart failure and with stage 5 chronic kidney     |              |
| disease, or end stage renal disease        6. Kidney transplant status |              |
|         7. Unspecified systolic (congestive) heart failure        8.   |              |
| Syncope and collapse        9. Type 2 diabetes mellitus with diabetic  |              |
| chronic kidney disease        10. Atherosclerotic heart disease of     |              |
| native coronary artery without angina pectoris      Sep 15, 2018       |              |
| [facility] [place] [facility]          Acute     |              |
| ischemic heart disease, unspecified        Long term (current) use of  |              |
| insulin        Dependence on renal dialysis        End stage renal     |              |
| disease        Type 2 diabetes mellitus with diabetic chronic kidney   |              |
| disease        Essential (primary) hypertension        Anemia in       |              |
| chronic kidney disease      2018 [facility] |              |
|  Medical Surgical          0. Cough        1. Pneumonia due to         |              |
| Pseudomonas        2. End stage renal disease        3. Hypertensive   |              |
| heart and chronic kidney disease with heart failure and with stage 5   |              |
| chronic kidney disease, or end stage renal disease        4. Cachexia  |              |
|        5. Chronic obstructive pulmonary disease with acute lower       |              |
| respiratory infection        6. Type 2 diabetes mellitus with diabetic |              |
|  chronic kidney disease        7. Heart failure, unspecified        8. |              |
|  Hyperlipidemia, unspecified        9. Gastro-esophageal reflux        |              |
| disease without esophagitis      2018 [facility]       |              |
| [facility] Medical Surgical          0. Other chest pain        1.      |              |
| Gastro-esophageal reflux disease without esophagitis        2. End     |              |
| stage renal disease        3. Hypertensive heart and chronic kidney    |              |
| disease with heart failure and with stage 5 chronic kidney disease, or |              |
|  end stage renal disease        4. Chronic systolic (congestive) heart |              |
|  failure        5. Atherosclerotic heart disease of native coronary    |              |
| artery with other forms of angina pectoris        6. Type 2 diabetes   |              |
| mellitus with diabetic chronic kidney disease        7.                |              |
| Hyperlipidemia, unspecified        8. Major depressive disorder,       |              |
| single episode, unspecified        9. Chronic obstructive pulmonary    |              |
| disease, unspecified      Mar 6, 2018 [facility]     |              |
| [facility] Cardiology          Heart Failure        Need for iv access  |              |
|        Type 2 diabetes mellitus with other specified complication      |              |
|      Long term (current) use of insulin        Paroxysmal atrial       |              |
| fibrillation        Anemia in chronic kidney disease                   |              |
| Atherosclerotic heart disease of native coronary artery without angina |              |
|  pectoris        Cardiomyopathy, unspecified        End stage renal    |              |
| disease        Other specified postprocedural states            Care   |              |
| Providers  Provider PRC Type Phone Fax Service Dates   HEADINGS, SANTIAGO, |              |
|  F.N.P. Nurse Practitioner: Family     Current      Marco Antonio Martinez     |              |
| /Care Coordinator [phone]    2019 -          |              |
 
| Current      [facility] [phone]    2019 |              |
|  - Current      [facility] Primary Care    ([phone])    |              |
| [phone] Current      [facility] Primary     |              |
| Care     Current      MANOLO GONZALEZ Primary Care     Current            |              |
| [facility] Provider [phone]     |              |
| Current      [facility] Case or Care Manager     Current      [facility]   |              |
| MIRTA Sr Case or Care Manager [phone]     |              |
| [phone] Current      Jairo Gutierrez MD Other     Current           |              |
| [employer]  This patient has registered at the [facility]  |              |
| [facility] Emergency Department   For more information visit:      |              |
| [email or web address] |              |
| [id number]   The above information is provided for the sole purpose of     |              |
| patient treatment. Use of this information beyond the terms of Data    |              |
| Sharing Memorandum of Understanding and License Agreement is           |              |
| prohibited. In certain cases not all visits may be represented.        |              |
| Consult the aforementioned facilities for additional information.      |              |
| 2019 [employer]. - [place], [place] -      |              |
| [email or web address]                                         |              |
+------------------------------------------------------------------------+--------------+
 
 
 
+-------------------------------------------------------------------------------------------
--------------------------------------------------------------------------------------------
--------------------+
| Procedure Note                                                                            
                                                                                            
                    |
+-------------------------------------------------------------------------------------------
--------------------------------------------------------------------------------------------
--------------------+
|   Interface, Lab - 2019  4:00 PM PST  Formatting of this note may be different      
                                                                                            
                    |
| from the original.[id number] [id number] Met  Care Guidelines  10 in     
                                                                                            
                    |
| 12Security and SafetyNo recent Security Events currently on fileED Care Guidelines from   
                                                                                            
                    |
| Myranda  HollycierraLast Updated: 18 10:16 AM  Other Information:Currently being      
                                                                                            
                    |
| seen by Myranda in [place] for mental health concerns.[phone] are            
                                                                                            
                    |
| guidelines and the provider should exercise clinical judgment when providing care.ED      
                                                                                            
                    |
| Care Guidelines from [facility] Updated: 17 10:44 AM  Care             
                                                                                            
                    |
| Coordination:This patient has been identified as having at leastEmergency Departments     
                                                                                            
                    |
| visits in the 12 months immediately preceding the date these guidelines were              
                                                                                            
                    |
| entered.Patient requires education on appropriate ED usage.Emphasize the importance of    
                                                                                            
 
                    |
| using outpatient medical services for the treatment of chronic conditions.Please contact  
                                                                                            
                    |
|  Community Health WorkerWillard at [phone] if patient is seen in ED.These are      
                                                                                            
                    |
| guidelines and the provider should exercise clinical judgment when providing care.These   
                                                                                            
                    |
| are guidelines and the provider should exercise clinical judgment when providing          
                                                                                            
                    |
| care.Prescription Drug Report (12 Mo.)PDMP query found no report.E.D. Visit Count (12     
                                                                                            
                    |
| mo.)Facility Visits Low Acuity [facility] 18 0 [facility] 2 0    
                                                                                            
                    |
| [facility] 4 0 Total 24 0 Note: Visits indicate total known visits.   
                                                                                            
                    |
| Medicaid Low Acuity Dx are the number of primary diagnoses on the Medicaid's Low Acuity   
                                                                                            
                    |
| dx list.  Recent Emergency Department Visit SummaryShowing 10 most recent visits out of   
                                                                                            
                    |
| 24 in the past 12 monthsDate Facility City State Type Diagnoses or Chief Complaint Feb    
                                                                                            
                    |
| 2019 [facility] [place]. [place] Emergency    Multiple Falls    Referral           
                                                                                            
                    |
| Circulatory Problem  2019 [facility]. [place] Emergency    ABD PAIN     
                                                                                            
                    |
|  Other chest pain  2019 [facility] [place] Emergency    Foot Pain     
                                                                                            
                    |
| 2019 [facility] [place]. [place] Emergency    Chest Pain    Nausea             
                                                                                            
                    |
| Shortness of Breath    Chest pain, unspecified    Elevated blood-pressure reading,        
                                                                                            
                    |
| without diagnosis of hypertension    Presence of aortocoronary bypass graft    Other      
                                                                                            
                    |
| specified postprocedural states  2019 [facility] [place]    
                                                                                            
                    |
|   CHEST PAIN    Abnormal levels of other serum enzymes    Personal history of other       
                                                                                            
                    |
| diseases of the circulatory system    Chest pain, unspecified  2019 [facility]  
                                                                                            
                    |
|  [facility] [place]. [place] Emergency    FISTULA BLEEDING    Hemorrhage due to vascular prosthetic  
                                                                                            
 
                    |
|  devices, implants and grafts, initial encounter  Dec 22, 2018 [facility]       
                                                                                            
                    |
| [place]. [place] Emergency    CHEST PAIN    Type 2 diabetes mellitus with hyperglycemia          
                                                                                            
                    |
| Chest pain, unspecified  2018 [facility] [place] [place] [facility]  Chief      
                                                                                            
                    |
| Complaint: SOB  2018 [facility] [place] Emergency    FALL             
                                                                                            
                    |
| Unspecified fall, initial encounter    Dependence on renal dialysis    End stage renal    
                                                                                            
                    |
| disease  2018 [facility] [place] Emergency    FALL    Essential       
                                                                                            
                    |
| (primary) hypertension    Type 2 diabetes mellitus with hyperglycemia    Type 2 diabetes  
                                                                                            
                    |
|  mellitus with unspecified complications    Unspecified fall, initial encounter           
                                                                                            
                    |
| Headache  Recent Inpatient Visit SummaryShowing 10 most recent visits out of 11 in the    
                                                                                            
                    |
| past 12 monthsDate Facility City State Type Diagnoses or Chief Complaint 2019     
                                                                                            
                    |
| [facility] [place] Vascular Surgery    Foot Pain    End stage renal disease   
                                                                                            
                    |
|    Dependence on renal dialysis    Peripheral vascular disease, unspecified    Anemia in  
                                                                                            
                    |
|  chronic kidney disease    Other disorders of plasma-protein metabolism, not elsewhere    
                                                                                            
                    |
| classified    Other specified personal risk factors, not elsewhere classified  Dec 27,    
                                                                                            
                    |
|  [facility] [place] Recovery    Peripheral arterial disease,              
                                                                                            
                    |
| osteomyelitis left foot    Other acute osteomyelitis, left ankle and foot    Long term    
                                                                                            
                    |
| (current) use of antibiotics    End stage renal disease    Anemia in chronic kidney       
                                                                                            
                    |
| disease  Dec 5, 2018 [facility] [place] General Medicine    Peripheral        
                                                                                            
                    |
| vascular disease, unspecified    End stage renal disease    Dependence on renal dialysis  
                                                                                            
                    |
|     Long term (current) use of insulin    Other chronic osteomyelitis, left ankle and     
                                                                                            
 
                    |
| foot    Type 2 diabetes mellitus with diabetic chronic kidney disease    Essential        
                                                                                            
                    |
| (primary) hypertension  2018 [facility] [place] Inpatient    Upper    
                                                                                            
                    |
| GIB    Other chronic osteomyelitis, unspecified ankle and foot    End stage renal         
                                                                                            
                    |
| disease    Other specified personal risk factors, not elsewhere classified    Chronic     
                                                                                            
                    |
| systolic (congestive) heart failure    Anemia in chronic kidney disease    Other          
                                                                                            
                    |
| disorders of plasma-protein metabolism, not elsewhere classified    Moderate              
                                                                                            
                    |
| protein-calorie malnutrition    Other disorders of phosphorus metabolism  2018    
                                                                                            
                    |
| [place]. [place] Medical Surgical    1. Type 2 diabetes mellitus with other  
                                                                                            
                    |
|  specified complication    2. Urinary tract infection, site not specified    3.           
                                                                                            
                    |
| Unspecified protein-calorie malnutrition    4. Other chronic osteomyelitis, unspecified   
                                                                                            
                    |
| site    5. Hypertensive heart and chronic kidney disease with heart failure and with      
                                                                                            
                    |
| stage 5 chronic kidney disease, or end stage renal disease    6. Chronic diastolic        
                                                                                            
                    |
| (congestive) heart failure    7. Other osteomyelitis, ankle and foot    8. Type 2         
                                                                                            
                    |
| diabetes mellitus with foot ulcer    9. Atherosclerotic heart disease of native coronary  
                                                                                            
                    |
|  artery without angina pectoris    10. Chronic obstructive pulmonary disease,             
                                                                                            
                    |
| unspecified  2018 [facility] [place] [facility]    1. Benign      
                                                                                            
                    |
| paroxysmal vertigo, unspecified ear    2. End stage renal disease    3. Hypertensive      
                                                                                            
                    |
| chronic kidney disease with stage 5 chronic kidney disease or end stage renal disease     
                                                                                            
                    |
|  4. Urinary tract infection, site not specified    5. Hypertensive heart and chronic      
                                                                                            
                    |
| kidney disease with heart failure and with stage 5 chronic kidney disease, or end stage   
                                                                                            
 
                    |
| renal disease    6. Kidney transplant status    7. Unspecified systolic (congestive)      
                                                                                            
                    |
| heart failure    8. Syncope and collapse    9. Type 2 diabetes mellitus with diabetic     
                                                                                            
                    |
| chronic kidney disease    10. Atherosclerotic heart disease of native coronary artery     
                                                                                            
                    |
| without angina pectoris  Sep 15, 2018 [facility] JANEEN Vivar. [place] [facility]          
                                                                                            
                    |
| Surgical    Acute ischemic heart disease, unspecified    Long term (current) use of       
                                                                                            
                    |
| insulin    Dependence on renal dialysis    End stage renal disease    Type 2 diabetes     
                                                                                            
                    |
| mellitus with diabetic chronic kidney disease    Essential (primary) hypertension         
                                                                                            
                    |
| Anemia in chronic kidney disease  2018 [facility]. [place] [facility]      
                                                                                            
                    |
| Surgical    0. Cough    1. Pneumonia due to Pseudomonas    2. End stage renal disease     
                                                                                            
                    |
|  3. 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     
                                                                                            
                    |
| chronic kidney disease, or end stage renal disease    4. Cachexia    5. Chronic           
                                                                                            
                    |
| obstructive pulmonary disease with acute lower respiratory infection    6. Type 2         
                                                                                            
                    |
| diabetes mellitus with diabetic chronic kidney disease    7. Heart failure, unspecified   
                                                                                            
                    |
|    8. Hyperlipidemia, unspecified    9. Gastro-esophageal reflux disease without          
                                                                                            
                    |
| esophagitis  2018 [facility] [place] [facility]    0. Other       
                                                                                            
                    |
| chest pain    1. Gastro-esophageal reflux disease without esophagitis    2. End stage     
                                                                                            
                    |
| renal disease    3. Hypertensive heart and chronic kidney disease with heart failure and  
                                                                                            
                    |
|  with stage 5 chronic kidney disease, or end stage renal disease    4. Chronic systolic   
                                                                                            
                    |
| (congestive) heart failure    5. Atherosclerotic heart disease of native coronary artery  
                                                                                            
                    |
|  with other forms of angina pectoris    6. Type 2 diabetes mellitus with diabetic         
                                                                                            
 
                    |
| chronic kidney disease    7. Hyperlipidemia, unspecified    8. Major depressive           
                                                                                            
                    |
| disorder, single episode, unspecified    9. Chronic obstructive pulmonary disease,        
                                                                                            
                    |
| unspecified  Mar 6, 2018 [facility]. [place] [facility]    Heart       
                                                                                            
                    |
| Failure    Need for iv access    Type 2 diabetes mellitus with other specified            
                                                                                            
                    |
| complication    Long term (current) use of insulin    Paroxysmal atrial fibrillation      
                                                                                            
                    |
| Anemia in chronic kidney disease    Atherosclerotic heart disease of native coronary      
                                                                                            
                    |
| artery without angina pectoris    Cardiomyopathy, unspecified    End stage renal disease  
                                                                                            
                    |
|     Other specified postprocedural states  Care ProvidersProvider PRC Type Phone Fax      
                                                                                            
                    |
| Service Dates HEADINGS, SALEEM HENDERSONN.P. Nurse Practitioner: Family   Current  Michelle,      
                                                                                            
                    |
| Marco Antonio /Care Coordinator [phone]  2019 - Current  [facility]  
                                                                                            
                    |
|  Primary Care [phone]  2019 - Current  [facility]        
                                                                                            
                    |
| Primary Care  [phone] Current  [facility]   
                                                                                            
                    |
|   Current  [facility] Primary Care   Current  [facility]      
                                                                                            
                    |
| [phone] Current  [facility] Case or Care Manager   Current  [facility]  
                                                                                            
                    |
|  MIRTA Sr Case or Care Manager [phone] Current      
                                                                                            
                    |
| Jairo Gutierrez MD Other   Current  Collective PortalThis patient has registered at     
                                                                                            
                    |
| the [facility] Emergency Department For more information visit:       
                                                                                            
                    |
| [email or web address] The above    
                                                                                            
                    |
| information is provided for the sole purpose of patient treatment. Use of this            
                                                                                            
                    |
| information beyond the terms of Data Sharing Memorandum of Understanding and License      
                                                                                            
 
                    |
| Agreement is prohibited. In certain cases not all visits may be represented. Consult the  
                                                                                            
                    |
|  aforementioned facilities for additional information.  [employer]            
                                                                                            
                    |
| [employer]. [facility], [place] - [email or web address]                  
                                                                                            
                    |
|   End stage renal disease                                                                 
                                                                                            
                    |
|   Anemia in chronic kidney disease                                                        
                                                                                            
                    |
|                                                                                           
                                                                                            
                    |
|Dec 5, 2018 [facility]                                
                                                                                            
                    |
|  Peripheral vascular disease, unspecified                                                 
                                                                                            
                    |
|   End stage renal disease                                                                 
                                                                                            
                    |
|   Dependence on renal dialysis                                                            
                                                                                            
                    |
|   Long term (current) use of insulin                                                      
                                                                                            
                    |
|   Other chronic osteomyelitis, left ankle and foot                                        
                                                                                            
                    |
|   Type 2 diabetes mellitus with diabetic chronic kidney disease                           
                                                                                            
                    |
|   Essential (primary) hypertension                                                        
                                                                                            
                    |
|                                                                                           
                                                                                            
                    |
|2018 [facility]                                      
                                                                                            
                    |
|  Upper GIB                                                                                
                                                                                            
                    |
|   Other chronic osteomyelitis, unspecified ankle and foot                                 
                                                                                            
                    |
|   End stage renal disease                                                                 
                                                                                            
                    |
|   Other specified personal risk factors, not elsewhere classified                         
                                                                                            
 
                    |
|   Chronic systolic (congestive) heart failure                                             
                                                                                            
                    |
|   Anemia in chronic kidney disease                                                        
                                                                                            
                    |
|   Other disorders of plasma-protein metabolism, not elsewhere classified                  
                                                                                            
                    |
|   Moderate protein-calorie malnutrition                                                   
                                                                                            
                    |
|   Other disorders of phosphorus metabolism                                                
                                                                                            
                    |
|                                                                                           
                                                                                            
                    |
|2018 [facility]. [place] [facility]                                
                                                                                            
                    |
|  1. Type 2 diabetes mellitus with other specified complication                            
                                                                                            
                    |
|   2. Urinary tract infection, site not specified                                          
                                                                                            
                    |
|   3. Unspecified protein-calorie malnutrition                                             
                                                                                            
                    |
|   4. Other chronic osteomyelitis, unspecified site                                        
                                                                                            
                    |
|   5. 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
onic kidney disease, or end stage renal disease                                             
                    |
|   6. Chronic diastolic (congestive) heart failure                                         
                                                                                            
                    |
|   7. Other osteomyelitis, ankle and foot                                                  
                                                                                            
                    |
|   8. Type 2 diabetes mellitus with foot ulcer                                             
                                                                                            
                    |
|   9. Atherosclerotic heart disease of native coronary artery without angina pectoris      
                                                                                            
                    |
|   10. Chronic obstructive pulmonary disease, unspecified                                  
                                                                                            
                    |
|                                                                                           
                                                                                            
                    |
|2018 [facility]. [place] [facility]                                 
                                                                                            
                    |
|  1. Benign paroxysmal vertigo, unspecified ear                                            
                                                                                            
 
                    |
|   2. End stage renal disease                                                              
                                                                                            
                    |
|   3. Hypertensive chronic kidney disease with stage 5 chronic kidney disease or end stage 
renal disease                                                                               
                    |
|   4. Urinary tract infection, site not specified                                          
                                                                                            
                    |
|   5. 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
onic kidney disease, or end stage renal disease                                             
                    |
|   6. Kidney transplant status                                                             
                                                                                            
                    |
|   7. Unspecified systolic (congestive) heart failure                                      
                                                                                            
                    |
|   8. Syncope and collapse                                                                 
                                                                                            
                    |
|   9. Type 2 diabetes mellitus with diabetic chronic kidney disease                        
                                                                                            
                    |
|   10. Atherosclerotic heart disease of native coronary artery without angina pectoris     
                                                                                            
                    |
|                                                                                           
                                                                                            
                    |
|[facility] [place] [facility]                           
                                                                                            
                    |
|  Acute ischemic heart disease, unspecified                                                
                                                                                            
                    |
|   Long term (current) use of insulin                                                      
                                                                                            
                    |
|   Dependence on renal dialysis                                                            
                                                                                            
                    |
|   End stage renal disease                                                                 
                                                                                            
                    |
|   Type 2 diabetes mellitus with diabetic chronic kidney disease                           
                                                                                            
                    |
|   Essential (primary) hypertension                                                        
                                                                                            
                    |
|   Anemia in chronic kidney disease                                                        
                                                                                            
                    |
|                                                                                           
                                                                                            
                    |
|2018 [facility] [place] [facility]                                
                                                                                            
 
                    |
|  0. Cough                                                                                 
                                                                                            
                    |
|   1. Pneumonia due to Pseudomonas                                                         
                                                                                            
                    |
|   2. End stage renal disease                                                              
                                                                                            
                    |
|   3. 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
onic kidney disease, or end stage renal disease                                             
                    |
|   4. Cachexia                                                                             
                                                                                            
                    |
|   5. Chronic obstructive pulmonary disease with acute lower respiratory infection         
                                                                                            
                    |
|   6. Type 2 diabetes mellitus with diabetic chronic kidney disease                        
                                                                                            
                    |
|   7. Heart failure, unspecified                                                           
                                                                                            
                    |
|   8. Hyperlipidemia, unspecified                                                          
                                                                                            
                    |
|   9. Gastro-esophageal reflux disease without esophagitis                                 
                                                                                            
                    |
|                                                                                           
                                                                                            
                    |
|2018 [facility] Akash Cox. [place] Medical Surgical                                 
                                                                                            
                    |
|  0. Other chest pain                                                                      
                                                                                            
                    |
|   1. Gastro-esophageal reflux disease without esophagitis                                 
                                                                                            
                    |
|   2. End stage renal disease                                                              
                                                                                            
                    |
|   3. 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
onic kidney disease, or end stage renal disease                                             
                    |
|   4. Chronic systolic (congestive) heart failure                                          
                                                                                            
                    |
|   5. 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
ctoris                                                                                      
                    |
|   6. Type 2 diabetes mellitus with diabetic chronic kidney disease                        
                                                                                            
                    |
|   7. Hyperlipidemia, unspecified                                                          
                                                                                            
 
                    |
|   8. Major depressive disorder, single episode, unspecified                               
                                                                                            
                    |
|   9. Chronic obstructive pulmonary disease, unspecified                                   
                                                                                            
                    |
|                                                                                           
                                                                                            
                    |
|Mar 6, 2018 [facility] JANEEN Ramsay. [place] Cardiology                              
                                                                                            
                    |
|  Heart Failure                                                                            
                                                                                            
                    |
|   Need for iv access                                                                      
                                                                                            
                    |
|   Type 2 diabetes mellitus with other specified complication                              
                                                                                            
                    |
|   Long term (current) use of insulin                                                      
                                                                                            
                    |
|   Paroxysmal atrial fibrillation                                                          
                                                                                            
                    |
|   Anemia in chronic kidney disease                                                        
                                                                                            
                    |
|   Atherosclerotic heart disease of native coronary artery without angina pectoris         
                                                                                            
                    |
|   Cardiomyopathy, unspecified                                                             
                                                                                            
                    |
|   End stage renal disease                                                                 
                                                                                            
                    |
|   Other specified postprocedural states                                                   
                                                                                            
                    |
|                                                                                           
                                                                                            
                    |
|                                                                                           
                                                                                            
                    |
|                                                                                           
                                                                                            
                    |
|Care Providers                                                                             
                                                                                            
                    |
|Provider PRC Type Phone Fax Service Dates                                                  
                                                                                            
                    |
|JOHNS, ELZA HENDERSON Nurse Practitioner: Family   Current                                
                                                                                            
 
                    |
|Marco Antonio Martinez /Care Coordinator [phone]  2019 - Current         
                                                                                            
                    |
|[facility] Primary Care [phone]  2019 - Current                          
                                                                                            
                    |
|[facility] Primary Care  [phone] Current                         
                                                                                            
                    |
|[facility] Primary Care   Current                                
                                                                                            
                    |
|[facility] GONZALEZ Primary Care   Current                                                        
                                                                                            
                    |
|[facility] Provider [phone] Current                 
                                                                                            
                    |
|[facility] Case or Care Manager   Current                                                   
                                                                                            
                    |
|MIRTA Goel Case or Care Manager [phone] Current
                                                                                            
                    |
|Jairo Gutierrez MD Other   Current                                                       
                                                                                            
                    |
|                                                                                           
                                                                                            
                    |
|[employer] [facility]                                                                          
                                                                                            
                    |
|This patient has registered at the [facility] Emergency Department     
                                                                                            
                    |
|For more information visit: [email or web address]
[email or web address]                                                                               
                    |
|The above information is provided for the sole purpose of patient treatment. Use of this in
formation beyond the terms of Data Sharing Memorandum of Understanding and License Agreement
 is prohibited. In  |
|certain cases not all visits may be represented. Consult the aforementioned facilities for 
additional information.                                                                     
                    |
|2019 [employer]. - [place], [place] - [email or web address]                                                                                       
                    |
+-------------------------------------------------------------------------------------------
--------------------------------------------------------------------------------------------
--------------------+
 
 
 
+-------------------+---------+--------------------+--------------+
| Performing        | Address | City/State/Zipcode | Phone Number |
| Organization      |         |                    |              |
+-------------------+---------+--------------------+--------------+
|   ED INFORMATION  |         |                    |              |
 
| EXCHANGE          |         |                    |              |
+-------------------+---------+--------------------+--------------+
 in this encounter
 
 Visit Diagnoses
 Not on filein this encounter

## 2019-04-03 NOTE — XMS
Encounter Summary
  Created on: 2019
 
 Cherie Villalobos
 External Reference #: [id number]
 : 48
 Sex: Female
 
 Demographics
 
 
+-----------------------+--------------------------+
| Address               | [address]               |
|                       | [place]  [address] |
+-----------------------+--------------------------+
| Home Phone            | [phone]          |
+-----------------------+--------------------------+
| Preferred Language    | Unknown                  |
+-----------------------+--------------------------+
| Marital Status        |                   |
+-----------------------+--------------------------+
| Mandaeism Affiliation | 1013                     |
+-----------------------+--------------------------+
| Race                  | Unknown                  |
+-----------------------+--------------------------+
| Ethnic Group          | Unknown                  |
+-----------------------+--------------------------+
 
 
 Author
 
 
+--------------+-----------------------+
| Author       | Sherry Sleep Number |
+--------------+-----------------------+
| Organization | [facility] |
+--------------+-----------------------+
| Address      | Unknown               |
+--------------+-----------------------+
| Phone        | Unavailable           |
+--------------+-----------------------+
 
 
 
 Support
 
 
+---------------+--------------+---------------------+-----------------+
| Name          | Relationship | Address             | Phone           |
+---------------+--------------+---------------------+-----------------+
| Anoop Villalobos | ECON         | [place], | [phone] |
|               |              |  [place]  [address]     |                 |
+---------------+--------------+---------------------+-----------------+
| Jennifer Dickson | ECON         | [place]       | [phone] |
|               |              | [id number]               |                 |
+---------------+--------------+---------------------+-----------------+
 
 
 
 
 Care Team Providers
 
 
+-----------------------+------+-----------------+
| Care Team Member Name | Role | Phone           |
+-----------------------+------+-----------------+
| Ivy Couch DO      | PCP  | [phone] |
+-----------------------+------+-----------------+
 
 
 
 Encounter Details
 
 
+--------+------------+----------------------+-----------+-------------+
| Date   | Type       | Department           | Care Team | Description |
+--------+------------+----------------------+-----------+-------------+
| / | Procedure  |   [facility]    |           |             |
| 2019   | Pass       | [facility] [address]   |           |             |
|        |            | Floor [address] |           |             |
|        |            |   [address] [facility]     |           |             |
|        |            | [place] [address]   |           |             |
|        |            | [phone]         |           |             |
+--------+------------+----------------------+-----------+-------------+
 
 
 
 Social History
 
 
+---------------+------------+-----------+--------+------------------+
| Tobacco Use   | Types      | Packs/Day | Years  | Date             |
|               |            |           | Used   |                  |
+---------------+------------+-----------+--------+------------------+
| Former Smoker | Cigarettes | 1         | 30     | Quit: 2004 |
+---------------+------------+-----------+--------+------------------+
 
 
 
+---------------------+---+---+---+
| Smokeless Tobacco:  |   |   |   |
| Never Used          |   |   |   |
+---------------------+---+---+---+
 
 
 
+------------------------------+
| Comments: quite smoking  |
+------------------------------+
 
 
 
+-------------+-----------+---------+----------+
| Alcohol Use | Drinks/We | oz/Week | Comments |
|             | ek        |         |          |
+-------------+-----------+---------+----------+
| No          |           |         |          |
+-------------+-----------+---------+----------+
 
 
 
 
+------------------+---------------+
| Sex Assigned at  | Date Recorded |
| Birth            |               |
+------------------+---------------+
| Not on file      |               |
+------------------+---------------+
 as of this encounter
 
 Plan of Treatment
 
 
+--------+---------+-----------+----------------------+-------------+
| Date   | Type    | Specialty | Care Team            | Description |
+--------+---------+-----------+----------------------+-------------+
| 04/10/ | Office  | Podiatry  |   Srinivasan Jordan,  |             |
| 2019   | Visit   |           | DPM  [address], |             |
|        |         |           |  [place],  |             |
|        |         |           | [place] [address]             |             |
|        |         |           | [phone]         |             |
|        |         |           | [phone] (Fax)   |             |
+--------+---------+-----------+----------------------+-------------+
 as of this encounter
 
 Visit Diagnoses
 Not on filein this encounter

## 2019-04-03 NOTE — XMS
Encounter Summary
  Created on: 2019
 
 Cherie Villalobos
 External Reference #: [id number]
 : 48
 Sex: Female
 
 Demographics
 
 
+-----------------------+--------------------------+
| Address               | [address]               |
|                       | [place]  [address] |
+-----------------------+--------------------------+
| Home Phone            | [phone]          |
+-----------------------+--------------------------+
| Preferred Language    | Unknown                  |
+-----------------------+--------------------------+
| Marital Status        |                   |
+-----------------------+--------------------------+
| Congregation Affiliation | 1013                     |
+-----------------------+--------------------------+
| Race                  | Unknown                  |
+-----------------------+--------------------------+
| Ethnic Group          | Unknown                  |
+-----------------------+--------------------------+
 
 
 Author
 
 
+--------------+-----------------------+
| Author       | [facility]ry [employer] |
+--------------+-----------------------+
| Organization | [facility] |
+--------------+-----------------------+
| Address      | Unknown               |
+--------------+-----------------------+
| Phone        | Unavailable           |
+--------------+-----------------------+
 
 
 
 Support
 
 
+---------------+--------------+---------------------+-----------------+
| Name          | Relationship | Address             | Phone           |
+---------------+--------------+---------------------+-----------------+
| Anoop Villalobos | ECON         | [place], | [phone] |
|               |              |  [place]  [address]     |                 |
+---------------+--------------+---------------------+-----------------+
| Jennifer Dickson | ECON         | [place]       | [phone] |
|               |              | [id number]               |                 |
+---------------+--------------+---------------------+-----------------+
 
 
 
 
 Care Team Providers
 
 
+-----------------------+------+-----------------+
| Care Team Member Name | Role | Phone           |
+-----------------------+------+-----------------+
| Ivy Couch DO      | PCP  | [phone] |
+-----------------------+------+-----------------+
 
 
 
 Reason for Visit
 
 
+-----------+-------------+
| Reason    | Comments    |
+-----------+-------------+
| Follow-up | appointment |
+-----------+-------------+
 
 
 
 Encounter Details
 
 
+--------+-----------+----------------------+----------------------+---------------+
| Date   | Type      | Department           | Care Team            | Description   |
+--------+-----------+----------------------+----------------------+---------------+
| / | Telephone |   [facility] |   Srinivasan Jordan,  | Follow-up     |
| 2019   |           |  and Ankle  [address]      | DPM  [address], | (appointment) |
|        |           | [address]   |  [address]  [place],  |               |
|        |           | [place], [place]         | [place] [address]             |               |
|        |           | [address]           | [phone]         |               |
|        |           | [phone]         | [phone] (Fax)   |               |
+--------+-----------+----------------------+----------------------+---------------+
 
 
 
 Social History
 
 
+---------------+------------+-----------+--------+------------------+
| Tobacco Use   | Types      | Packs/Day | Years  | Date             |
|               |            |           | Used   |                  |
+---------------+------------+-----------+--------+------------------+
| Former Smoker | Cigarettes | 1         | 30     | Quit: 2004 |
+---------------+------------+-----------+--------+------------------+
 
 
 
+---------------------+---+---+---+
| Smokeless Tobacco:  |   |   |   |
| Never Used          |   |   |   |
+---------------------+---+---+---+
 
 
 
+------------------------------+
| Comments: quite smoking 2004 |
 
+------------------------------+
 
 
 
+-------------+-----------+---------+----------+
| Alcohol Use | Drinks/We | oz/Week | Comments |
|             | ek        |         |          |
+-------------+-----------+---------+----------+
| No          |           |         |          |
+-------------+-----------+---------+----------+
 
 
 
+------------------+---------------+
| Sex Assigned at  | Date Recorded |
| Birth            |               |
+------------------+---------------+
| Not on file      |               |
+------------------+---------------+
 as of this encounter
 
 Plan of Treatment
 
 
+--------+---------+-----------+----------------------+-------------+
| Date   | Type    | Specialty | Care Team            | Description |
+--------+---------+-----------+----------------------+-------------+
| 04/10/ | Office  | Podiatry  |   Srinivasan Jordan,  |             |
|    | Visit   |           | DPCLARE  [address], |             |
|        |         |           |  [address]  [place],  |             |
|        |         |           | [place] [address]             |             |
|        |         |           | [phone]         |             |
|        |         |           | [phone] (Fax)   |             |
+--------+---------+-----------+----------------------+-------------+
 as of this encounter
 
 Visit Diagnoses
 Not on filein this encounter

## 2019-04-03 NOTE — XMS
Encounter Summary
  Created on: 2019
 
 Cherie Villalobos
 External Reference #: [id number]
 : 48
 Sex: Female
 
 Demographics
 
 
+-----------------------+--------------------------+
| Address               | [address]               |
|                       | [place]  [address] |
+-----------------------+--------------------------+
| Home Phone            | [phone]          |
+-----------------------+--------------------------+
| Preferred Language    | Unknown                  |
+-----------------------+--------------------------+
| Marital Status        |                   |
+-----------------------+--------------------------+
| Hinduism Affiliation | 1013                     |
+-----------------------+--------------------------+
| Race                  | Unknown                  |
+-----------------------+--------------------------+
| Ethnic Group          | Unknown                  |
+-----------------------+--------------------------+
 
 
 Author
 
 
+--------------+-----------------------+
| Author       | Sherry [employer] |
+--------------+-----------------------+
| Organization | [facility] |
+--------------+-----------------------+
| Address      | Unknown               |
+--------------+-----------------------+
| Phone        | Unavailable           |
+--------------+-----------------------+
 
 
 
 Support
 
 
+---------------+--------------+---------------------+-----------------+
| Name          | Relationship | Address             | Phone           |
+---------------+--------------+---------------------+-----------------+
| Anoop Villalobos | ECON         | [place], | [phone] |
|               |              |  [place]  [address]     |                 |
+---------------+--------------+---------------------+-----------------+
| Jennifer Dickson | ECON         | [place]       | [phone] |
|               |              | [phone]               |                 |
+---------------+--------------+---------------------+-----------------+
 
 
 
 
 Care Team Providers
 
 
+-----------------------+------+-----------------+
| Care Team Member Name | Role | Phone           |
+-----------------------+------+-----------------+
| Ivy Couch DO      | PCP  | [phone] |
+-----------------------+------+-----------------+
 
 
 
 Reason for Visit
 
 
+--------+-----------------------------------------------------+
| Reason | Comments                                            |
+--------+-----------------------------------------------------+
| [facility]  | [facility] Rounding Note |
+--------+-----------------------------------------------------+
 
 
 
 Encounter Details
 
 
+--------+-------------+----------------------+----------------------+----------------------
+
| Date   | Type        | Department           | Care Team            | Description          
|
+--------+-------------+----------------------+----------------------+----------------------
+
| 02/15/ | Documentati |   [facility]      |   João Asher MD  | Other (January       
|
| 2019   | on Only     | [address]        |  [address]   | 2019-[facility] Provider 
|
|        |             | [address]   | [address]  [place], [place]    |  Dialysis Rounding   
|
|        |             | [place] [address]   | [id number]  [phone]  | Note)                
|
|        |             | [phone]         |  [phone] (Fax)  |                      
|
+--------+-------------+----------------------+----------------------+----------------------
+
 
 
 
 Social History
 
 
+---------------+------------+-----------+--------+------------------+
| Tobacco Use   | Types      | Packs/Day | Years  | Date             |
|               |            |           | Used   |                  |
+---------------+------------+-----------+--------+------------------+
| Former Smoker | Cigarettes | 1         | 30     | Quit: 2004 |
+---------------+------------+-----------+--------+------------------+
 
 
 
+---------------------+---+---+---+
 
| Smokeless Tobacco:  |   |   |   |
| Never Used          |   |   |   |
+---------------------+---+---+---+
 
 
 
+------------------------------+
| Comments: quite smoking  |
+------------------------------+
 
 
 
+-------------+-----------+---------+----------+
| Alcohol Use | Drinks/We | oz/Week | Comments |
|             | ek        |         |          |
+-------------+-----------+---------+----------+
| No          |           |         |          |
+-------------+-----------+---------+----------+
 
 
 
+------------------+---------------+
| Sex Assigned at  | Date Recorded |
| Birth            |               |
+------------------+---------------+
| Not on file      |               |
+------------------+---------------+
 as of this encounter
 
 Plan of Treatment
 
 
+--------+---------+-----------+----------------------+-------------+
| Date   | Type    | Specialty | Care Team            | Description |
+--------+---------+-----------+----------------------+-------------+
| 04/10/ | Office  | Podiatry  |   Srinivasan Jordan,  |             |
|    | Visit   |           | DPM  [address], |             |
|        |         |           |  [address]  [place],  |             |
|        |         |           | [place] [address]             |             |
|        |         |           | [phone]         |             |
|        |         |           | [phone] (Fax)   |             |
+--------+---------+-----------+----------------------+-------------+
 as of this encounter
 
 Visit Diagnoses
 Not on filein this encounter

## 2019-04-03 NOTE — XMS
Encounter Summary
  Created on: 2019
 
 Cherie Villalobos
 External Reference #: [id number]
 : 48
 Sex: Female
 
 Demographics
 
 
+-----------------------+--------------------------+
| Address               | [address]               |
|                       | [place]  [address] |
+-----------------------+--------------------------+
| Home Phone            | [phone]          |
+-----------------------+--------------------------+
| Preferred Language    | Unknown                  |
+-----------------------+--------------------------+
| Marital Status        |                   |
+-----------------------+--------------------------+
| [employer] Affiliation | 1013                     |
+-----------------------+--------------------------+
| Race                  | Unknown                  |
+-----------------------+--------------------------+
| Ethnic Group          | Unknown                  |
+-----------------------+--------------------------+
 
 
 Author
 
 
+--------------+-----------------------+
| Author       | Sherry Zidisha |
+--------------+-----------------------+
| Organization | [facility] |
+--------------+-----------------------+
| Address      | Unknown               |
+--------------+-----------------------+
| Phone        | Unavailable           |
+--------------+-----------------------+
 
 
 
 Support
 
 
+---------------+--------------+---------------------+-----------------+
| Name          | Relationship | Address             | Phone           |
+---------------+--------------+---------------------+-----------------+
| Anoop Villalobos | ECON         | [place], | [phone] |
|               |              |  [place]  [address]     |                 |
+---------------+--------------+---------------------+-----------------+
| Jennifer Dickson | ECON         | [place]       | [phone] |
|               |              | [address]               |                 |
+---------------+--------------+---------------------+-----------------+
 
 
 
 
 Care Team Providers
 
 
+-----------------------+------+-----------------+
| Care Team Member Name | Role | Phone           |
+-----------------------+------+-----------------+
| Ivy Couch DO      | PCP  | [phone] |
+-----------------------+------+-----------------+
 
 
 
 Reason for Visit
 
 
+--------+------------------------------------------------------+
| Reason | Comments                                             |
+--------+------------------------------------------------------+
| [facility]  | [facility] D[facility] Rounding Note |
+--------+------------------------------------------------------+
 
 
 
 Encounter Details
 
 
+--------+-------------+----------------------+----------------------+----------------------
+
| Date   | Type        | Department           | Care Team            | Description          
|
+--------+-------------+----------------------+----------------------+----------------------
+
| / | Documentati |   [facility]      |   João Asher MD  | Other (December      
|
| 2019   | on Only     | [address]        |  [address]   | 2018-[facility] Provider 
|
|        |             | [address]   | [address]  [place], [place]    |  Dialysis Rounding   
|
|        |             | [place] [address]   | [id number] [phone]  | Note)                
|
|        |             | [phone]         |  [phone] (Fax)  |                      
|
+--------+-------------+----------------------+----------------------+----------------------
+
 
 
 
 Social History
 
 
+---------------+------------+-----------+--------+------------------+
| Tobacco Use   | Types      | Packs/Day | Years  | Date             |
|               |            |           | Used   |                  |
+---------------+------------+-----------+--------+------------------+
| Former Smoker | Cigarettes | 1         | 30     | Quit: 2004 |
+---------------+------------+-----------+--------+------------------+
 
 
 
+---------------------+---+---+---+
 
| Smokeless Tobacco:  |   |   |   |
| Never Used          |   |   |   |
+---------------------+---+---+---+
 
 
 
+------------------------------+
| Comments: quite smoking  |
+------------------------------+
 
 
 
+-------------+-----------+---------+----------+
| Alcohol Use | Drinks/We | oz/Week | Comments |
|             | ek        |         |          |
+-------------+-----------+---------+----------+
| No          |           |         |          |
+-------------+-----------+---------+----------+
 
 
 
+------------------+---------------+
| Sex Assigned at  | Date Recorded |
| Birth            |               |
+------------------+---------------+
| Not on file      |               |
+------------------+---------------+
 as of this encounter
 
 Plan of Treatment
 
 
+--------+---------+-----------+----------------------+-------------+
| Date   | Type    | Specialty | Care Team            | Description |
+--------+---------+-----------+----------------------+-------------+
| 04/10/ | Office  | Podiatry  |   Srinivasan Jordan,  |             |
|    | Visit   |           | DPM  [address], |             |
|        |         |           |  [address]  [place],  |             |
|        |         |           | [place] [address]             |             |
|        |         |           | [phone]         |             |
|        |         |           | [phone] (Fax)   |             |
+--------+---------+-----------+----------------------+-------------+
 as of this encounter
 
 Visit Diagnoses
 Not on filein this encounter

## 2019-04-03 NOTE — XMS
Encounter Summary
  Created on: 2019
 
 Cherie Villalobos
 External Reference #: [id number]
 : 48
 Sex: Female
 
 Demographics
 
 
+-----------------------+--------------------------+
| Address               | [address]               |
|                       | [place]  [address] |
+-----------------------+--------------------------+
| Home Phone            | [phone]          |
+-----------------------+--------------------------+
| Preferred Language    | Unknown                  |
+-----------------------+--------------------------+
| Marital Status        |                   |
+-----------------------+--------------------------+
| Evangelical Affiliation | [id number]                     |
+-----------------------+--------------------------+
| Race                  | Unknown                  |
+-----------------------+--------------------------+
| Ethnic Group          | Unknown                  |
+-----------------------+--------------------------+
 
 
 Author
 
 
+--------------+-----------------------+
| Author       | [facility] |
+--------------+-----------------------+
| Organization | [facility] [employer] [facility] |
+--------------+-----------------------+
| Address      | Unknown               |
+--------------+-----------------------+
| Phone        | Unavailable           |
+--------------+-----------------------+
 
 
 
 Support
 
 
+---------------+--------------+---------------------+-----------------+
| Name          | Relationship | Address             | Phone           |
+---------------+--------------+---------------------+-----------------+
| Anoop Villalobos | ECON         | [place], | [phone] |
|               |              |  [place]  [address]     |                 |
+---------------+--------------+---------------------+-----------------+
| Jennifer Dickson | ECON         | [place]       | [phone] |
|               |              | [address]               |                 |
+---------------+--------------+---------------------+-----------------+
 
 
 
 
 Care Team Providers
 
 
+-----------------------+------+-----------------+
| Care Team Member Name | Role | Phone           |
+-----------------------+------+-----------------+
| Ivy Couch DO      | PCP  | [phone] |
+-----------------------+------+-----------------+
 
 
 
 Reason for Visit
 Auth/Cert
 
+--------+--------+-----------+--------------+--------------+--------------+
| Status | Reason | Specialty | Diagnoses /  | Referred By  | Referred To  |
|        |        |           | Procedures   | Contact      | Contact      |
+--------+--------+-----------+--------------+--------------+--------------+
|        |        |           |   Diagnoses  |              |              |
|        |        |           |  Peripheral  |              |              |
|        |        |           | arterial     |              |              |
|        |        |           | disease,     |              |              |
|        |        |           | osteomyeliti |              |              |
|        |        |           | s left foot  |              |              |
|        |        |           |  Procedures  |              |              |
|        |        |           |  FOREFOOT -  |              |              |
|        |        |           | AMPUTATION   |              |              |
+--------+--------+-----------+--------------+--------------+--------------+
 
 
 
 
 Encounter Details
 
 
+--------+-----------+----------------------+----------------------+----------------------+
| Date   | Type      | Department           | Care Team            | Description          |
+--------+-----------+----------------------+----------------------+----------------------+
| / | Hospital  |   [facility]    |   Srinivasan Jordan,  | Acute osteomyelitis  |
| 2018 - | Encounter | [facility]       | DPM  [address], | of left ankle or     |
|        |           | Surgical  [address]  |  [address]  [place],  | foot (formerly [facility]);          |
| / |           | Blvd  [place], [place]   | [place] [address]             | Prophylactic         |
| 2019   |           | [address]  [phone]  | [phone]         | antibiotic; Anemia   |
|        |           |                      | [phone] (Fax)   | in ESRD (end-stage   |
|        |           |                      | Moiz Josue      | renal disease) (formerly [facility]) |
|        |           |                      | MD German  [address] |                      |
|        |           |                      |  Blvd  [place], [place]  |                      |
|        |           |                      | [address]  [phone]  |                      |
|        |           |                      |  [phone] (Fax)  |                      |
|        |           |                      |  Karen Vincentholden      |                      |
|        |           |                      | [facility]       |                      |
|        |           |                      | [place] [address]   |                      |
|        |           |                      | [phone]         |                      |
|        |           |                      | [phone] (Fax)   |                      |
+--------+-----------+----------------------+----------------------+----------------------+
 
 
 
 Social History
 
 
 
+---------------+------------+-----------+--------+------------------+
| Tobacco Use   | Types      | Packs/Day | Years  | Date             |
|               |            |           | Used   |                  |
+---------------+------------+-----------+--------+------------------+
| Former Smoker | Cigarettes | 1         | 30     | Quit: 2004 |
+---------------+------------+-----------+--------+------------------+
 
 
 
+---------------------+---+---+---+
| Smokeless Tobacco:  |   |   |   |
| Never Used          |   |   |   |
+---------------------+---+---+---+
 
 
 
+------------------------------+
| Comments: quite smoking 2004 |
+------------------------------+
 
 
 
+-------------+-----------+---------+----------+
| Alcohol Use | Drinks/We | oz/Week | Comments |
|             | ek        |         |          |
+-------------+-----------+---------+----------+
| No          |           |         |          |
+-------------+-----------+---------+----------+
 
 
 
+------------------+---------------+
| Sex Assigned at  | Date Recorded |
| Birth            |               |
+------------------+---------------+
| Not on file      |               |
+------------------+---------------+
 as of this encounter
 
 Last Filed Vital Signs
 
 
+-------------------+----------------------+-------------------------+
| Vital Sign        | Reading              | Time Taken              |
+-------------------+----------------------+-------------------------+
| Blood Pressure    | 125/60               | 2019 11:21 AM PST |
+-------------------+----------------------+-------------------------+
| Pulse             | 76                   | 2019 11:21 AM PST |
+-------------------+----------------------+-------------------------+
| Temperature       | 36.8   C (98.3   F)  | 2019 11:21 AM PST |
+-------------------+----------------------+-------------------------+
| Respiratory Rate  | 16                   | 2019 11:21 AM PST |
+-------------------+----------------------+-------------------------+
| Oxygen Saturation | 100%                 | 2019 11:21 AM PST |
+-------------------+----------------------+-------------------------+
| Inhaled Oxygen    | -                    | -                       |
| Concentration     |                      |                         |
+-------------------+----------------------+-------------------------+
 
| Weight            | 68.7 kg (151 lb 7.3  | 2018  3:49 PM PST |
|                   | oz)                  |                         |
+-------------------+----------------------+-------------------------+
| Height            | 177.8 cm (5' 10")    | 2018  3:49 PM PST |
+-------------------+----------------------+-------------------------+
| Body Mass Index   | 21.73                | 2018  3:49 PM PST |
+-------------------+----------------------+-------------------------+
 in this encounter
 
 Discharge Summaries
 Winston Vincnet DO - 2019  1:12 PM PSTFormatting of this note may be different from the 
original.
 [facility]
 Service:  Hospitalist
 Physician Discharge Summary 
 
 Patient ID:
 Cherie Villalobos
 MRN: [id number]
 1950
 
 68 y.o.
 
 Admit date: 2018
 
 Discharge date: 2019
 
 Admitting Physician: Srinivasan Jordan DPM 
 
 Discharge Physician: Winston Vincent DO
 
 Consultants: Treatment Team: 
 Consulting Physician: João Asher MD
 Consulting Physician: Michael Peguero MD
 Consulting Physician: Oumar Beard DPM
 Consulting Physician: Margarita Luz DO
 Admitting Provider: Srinivasan Jordan DPM
 
 Primary Discharge Diagnoses: 
 
 Principal Problem:
   Osteomyelitis of left foot (HCC)
 Active Problems:
   Secondary hyperparathyroidism (HCC)
   Depression
   ESRD (end stage renal disease) (HCC)
   Type 2 diabetes mellitus with chronic kidney disease on chronic dialysis, with long-term 
current use of insulin (HCC)
   Anemia in ESRD (end-stage renal disease) (HCC)
   Hypertension, essential
   Diabetic foot ulcer (HCC)
   Chronic systolic congestive heart failure (HCC)
   Chronic anticoagulation
   Cardiomyopathy (HCC)
   Paroxysmal atrial fibrillation (HCC)
   S/P CABG x 2
   S/P mitral valve repair
   PVD (peripheral vascular disease) (HCC)
   CAD (coronary artery disease)
 Resolved Problems:
 
   * No resolved hospital problems. *
 
 HPI (from h/p on /
 The patient is a 68 y.o. female with significant past medical history of  ESRD on HD every 
TTS, type 2 DM, HTN, PAD s/p angioplasty LLE, CAD s/pMI, CABG, s/p AVR in 2018,  HFrEF wit
h last EF of 20-25%, s/p AICD placement, AF on chronic anticoagulation,  was admitted for tr
ansmetatarsal amputation for osteomyelitis of the left 1st MT.  
 She has had chronic osteomyelitis of the first metatarsal and needing amputation. It was po
stponed until angioplasty of her LLE was done to optimize would healing. She underwent amput
ation today. EBL was < 200ml.  Per Dr. Jordan there was concern for residual bone infection
. He consulted ID who suggested cefepime 2gm post HD. 
 Patient was seen today at bedside, denies having chest pain, SOB, lighhteadedness, tolerati
ng clear liquid diet. 
 
 Hospital Course: 
 
 Patient was admitted to the hosptialsist service for further management of patient's non-he
aling foot ulcer. Antibiotics were managed by ID, Podiatry was consulted and evaluated patie
nt and took patient for left foot transmetatarsal amputation. Patient tolerated procedure we
ll. Nephrology was consulted and managed patient's ESRD/dialysis. During the patient's stay 
she developed chest pain that was resolved by nitroglycerin. Cardiology was consulted, kary
er there was not any interventions besides medical management that the patient was a candida
te for. Upon record review she had a stress test the month prior that was found to be negati
ve. Patient remained stable and was subsequently discharged to SNF for rehab and continued I
V antibiotics. She was given instructions to follow up with nephrology, ID, cardiology, [facility] care, and podiatry. 
 
 Past Medical History:
  
 Past Medical History 
 Diagnosis Date 
   Acute MI (formerly [facility])  
  with stenting x 1 
   Anemia associated with chronic renal failure 2016 
   Atrial fibrillation (formerly [facility])  
   Chronic kidney disease  
   Congestive heart failure (HCC)  
   COPD (chronic obstructive pulmonary disease) (HCC)  
   COPD (chronic obstructive pulmonary disease) (formerly [facility]) 2018 
   Coronary artery disease  
  stent placements: 3 total 
   Depression  
   Diabetes other 
  abstracted 
   Diabetes mellitus, type 2 (formerly [facility])  
   ESRD on peritoneal dialysis (formerly [facility])  
   GERD (gastroesophageal reflux disease)  
   Hemodialysis patient (formerly [facility]) 10/2016 
  Stopped peritoneal and restarted hemodialysis 
   Hemorrhage of gastrointestinal tract, unspecified 2017 
  low GI, rectal bleeding 
   High blood pressure other 
  abstracted 
   High cholesterol other 
  abstracted 
   Hyperlipidemia  
   Hypertension  
   Hyponatremia 2017 
   Myocardial infarction (formerly [facility]) 2017 
   Other chronic pain  
 
  feet,  
   Pain of left hip joint 2016 
  due to hip fracture and replacement 
   Partial small bowel obstruction (formerly [facility]) 2017 
  resolved 
   Renal failure  
  Stage 5.   Fistula in the JAN - not on dialysis yet. 
   Unspecified visual disturbance  
  glasses 
 
 Past Surgical History 
 Procedure Laterality Date 
   AV FISTULA PLACEMENT   
  left upper arm AV fistula - failed 
   AV FISTULA REPAIR N/A 10/25/2016 
  Procedure: AV FISTULA - GRAFT REPAIR/REVISION;  Surgeon: Kirt Mclaughlin MD;  Location: [facility]
IN OR;  Service: Vascular;  Laterality: N/A;  Superficialization and revision of left arm fi
stula 
   CARDIAC CATHETERIZATION  2017 
   CARPAL TUNNEL RELEASE Bilateral other 
  abstracted 
   CATARACT EXTRACTION Bilateral  
   CATHETER REMOVAL N/A 2016 
  Procedure: DIALYSIS CATHETER - REMOVAL;  Surgeon: Kirt Mclaughlin MD;  Location: [facility] OR; 
 Service: Vascular;  Laterality: N/A;  PD cath removal 
   CHOLECYSTECTOMY  other 
  abstracted 
   COLONOSCOPY   
   CORONARY ARTERY BYPASS GRAFT   
   CORONARY STENT PLACEMENT  2017 
  Drug Elutin stents to OM, 1 stent to prox. LAD 
   EYE SURGERY   
   FOOT AMPUTATION Left 2018 
  Procedure: FOREFOOT - AMPUTATION;  Surgeon: Srinivasan Jordan DPM;  Location: [facility] MAIN OR;
  Service: Podiatry;  Laterality: Left; 
   HARDWARE PRESENT   
  stent placements x 3, Left hip replacement, vascular stents 2 in left leg 
   HIP ARTHROPLASTY Left 2016 
  Procedure: HIP - ARTHROPLASTY(ALLISON);  Surgeon: Uriel Roa MD;  Location: [facility] MAIN 
OR;  Service: Orthopedics;  Laterality: Left; 
   HYSTERECTOMY  1998 
  complete 
   PACEMAKER INSERTION   
  [employer] pacemaker/defib 
   PERITONEAL CATHETER INSERTION  8/15/2013 
   PERITONEAL CATHETER INSERTION N/A 2016 
  Procedure: LAPAROSCOPIC - PERITONEAL DIALYSIS CATH INSERTION;  Surgeon: Kirt Mclaughlin MD;  Lo
cation: [facility] MAIN OR;  Service: Vascular;  Laterality: N/A;  Removal of old catheter 
   SHOULDER SURGERY Right  
  frozen shoulder 
   UNLISTED PROCEDURE ARTHROSCOPY   
  total Left hip 
   vascular stents Left 2017 
  leg (2 stents) 
   VASCULAR SURGERY   
 
 Discharged Condition: Stable for discharge as stated above.
 
 Significant Diagnostic Studies:
 Ct Head Without Contrast
 
 
 Result Date: 2018
 CHERIE LEBLANCEN 1950 68 years Female CT HEAD WO CONTRAST 2018 5:23 PM INDICA
TION: Altered mental status in a patient with acute osteomyelitis COMPARISON: Head CT, 2017 TECHNIQUE: CT scan of the head without contrast.  5-mm axial noncontrast images were ac
quired from the foramen magnum through the cranial vertex. Multiplanar reformations were obt
ained from the acquisition data. At least one of the following CT dose optimization techniqu
es were used: Automated exposure control; Adjustment of mA and/or kV according to patient si
ze; Use of iterative reconstruction technique. FINDINGS: Brain: No intracranial hemorrhage, 
midline shift or pathologic mass effect. No cerebral edema, mass lesion or evidence of acute
 infarct. Ventricles and extra-axial fluid spaces: Normal. Paranasal sinuses and mastoid air
 cells: Normal. Calvarium and extracranial soft tissues: Normal. Orbits: The patient is stat
us post bilateral cataract surgery. 
 
 1.  No acute intracranial pathology. Electronically signed by Steven Samano MD on  5:28 PM
 
 X-ray Chest 1 View
 
 Result Date: 2019
 CHERIE VILLALOBOS XR CHEST 1 VIEW 2019 4:43 PM HISTORY: 68 years.  Female.  Chest pa
in. TECHNIQUE: 1 view of the chest obtained at 1637 hours. COMPARISON: 2018. FINDINGS:
 A single lead ICD is seen over the right chest with an intact lead in proper position uncha
nged from the previous exam.  The sternotomy wires from prior CABG are noted.  An overlying 
EKG leads is seen.  A presumed loculated pleural fluid collection is seen overlying the left
 heart border similar to the previous exam.  This measures 3.0 x 5.2 cm in the transverse an
d craniocaudal dimensions.  Hazy opacity is seen in the lower right chest which may indicate
 the presence of a right-sided pleural effusion which is layering posteriorly.  No acute air
space disease, parenchymal nodule, mass or pneumothorax is noted.  Apical pleural thickening
 is seen.  The osseous structures are intact. 
 
 1.  Small loculated pleural fluid collection seen overlying the lateral left heart border i
n the lower left chest.  There may also be a small pleural effusion at the right lung base w
hich is layering posteriorly. 2.  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 Electronically s
igned by Eugenio Hoffman DO on 2019 4:59 PM
 
 Discharge Vitals:
 Vitals: 
  19 2301 19 0405 19 0720 19 1121 
 BP: 97/48 113/54 127/61 125/60 
 BP Location: Right upper arm Right upper arm Right upper arm Right upper arm 
 Pulse: 68 68 80 76 
 Resp: 18 18 16 16 
 Temp: 99 F (37.2 C) 98.3 F (36.8 C) 98.7 F (37.1 C) 98.3 F (36.8 C) 
 TempSrc: Oral Oral Oral Oral 
 SpO2:  97% 99% 100% 
 Weight:     
 Height:     
 
 Discharge Exam:
 
 General Appearance:    A & O x 3, no distress, appears stated age 
 Head:    Normocephalic, without obvious abnormality, atraumatic 
 Eyes:    PERRL, conjunctiva/corneas clear, EOM's intact.
   
 Ears:    Normal external ear canals, no otorrhea 
 Nose:   Nares normal, no drainage or sinus tenderness 
 Throat:   Lips, mucosa, and tongue normal; gums normal 
 Neck:   Supple, symmetrical, trachea; no carotid bruit or JVD 
 Back:     Symmetric, no curvature, ROM normal, no CVA tenderness 
 
 Lungs:     Clear to auscultation bilaterally, respirations unlabored 
 Chest Wall:    No tenderness or deformity 
  Heart:    Regular rate and rhythm, S1 and S2 normal, no murmur, rub   or gallop 
   
 Abdomen:     Soft, non-tender, bowel sounds active all four quadrants, no masses, no organo
megaly 
 Genitalia:    Deferred 
 Rectal:    Deferred
  
 Extremities:   Upper extremities no clubbing/ cyanosis/ erythema  Lower extremities  atraum
atic, no cyanosis or edema
 L foot Transmetatarsal amputation - dressing in place.  
 Pulses:   2+ and symmetric all extremities 
 Skin:   Skin warm, texture and turgor normal, no rashes or lesions 
 Lymph nodes:   No gross lymphadenopathy. 
 Neurologic:
 
 Psychiatric:   CNII-XII intact, normal strength, sensation normal
   
  Affect/ mood normal, behavior and judgement normal 
  
 
 LABS: 
 
 Recent Labs
 Lab 19
 0525 19
 0550 
 WBC 5.21 5.22 6.13 
 HGB 8.7* 8.4* 8.7* 
 HCT 26.3* 25.7* 27.0* 
  149* 156 
 NEUTOPHILPCT 69.68 72.47 71.63 
 MONOPCT 9.79 8.92 12.18 
 
 Recent Labs
 Lab 19
 0525 19
 0550 
  138 136 
 K 4.5 4.5 4.8 
 CL 99 100 98* 
 CO2 31 29 26 
 BUN 13 21 29* 
 CREATININE 3.5* 4.5* 6.7* 
 PROT 6.3 5.9* 6.4 
 BILITOT 0.4 0.3 0.4 
 ALT 17 16 17 
 AST 23 21 24 
 
 Phosphorus:  No results for input(s): PHOS in the last 168 hours.
 No results for input(s): MG in the last 168 hours.
 No results for input(s): AMYLASE in the last 168 hours.
 No results for input(s): LIPASE in the last 168 hours.
 No results for input(s): PHART, PO2ART, TTG9YRY, R9XIFVVY, BEART in the last 168 hours.
 No results for input(s): APTT, INR, PTT in the last 168 hours.
 No results for input(s): TSH, T3FREE, FREET4 in the last 168 hours.
 
 
 Recent Labs
 Lab 19
 0045 19
 2110 19
 1610 19
 0532 
 CKTOTAL  --   --  22* 23* 
 TROPONINI 0.256* 0.274* 0.318*  --  
 CKMBINDEX  --   --  8.2  --  
 
 Disposition:  SNF
 
 No discharge procedures on file.
 
 Follow up:
 Ivy Couch, DO
 [address], [address]
 [facility] [id number] [phone]
 
 Srinivasan Jordan, DPM
 [address], [address]
 [facility] [address]
 [phone]
 
 In 10 days
 
 Andrés Elliott MD
 [address]
 [facility] [id number] [phone]
 
 Ivy Couch
 
  
 Medication List 
  
 START taking these medications  
 CefTAZidime and Dextrose 2-5 GM-%(50ML) Solr
 QTY:  30 each
 Refills:  2
 Ceftazidime 2 gm iv after each hemodialysis until 19
  
 vancomycin 1000 mg injection
 QTY:  7 each
 Refills:  3
 Commonly known as:  VANCOCIN
 500 mg iv after each hemodialysis until 19 Cbc cmp esr crp vancomycin trough  q Monday
 Follow up with DR. Elliott in 2 weeks Call Tel [phone] to schedule follow up  Fax lab res
ults to [phone]
  
  
 CHANGE how you take these medications  
 atorvastatin 40 MG tablet
 QTY:  30 tablet
 Refills:  0
 Commonly known as:  LIPITOR
 Take 1 tablet by mouth nightly.
 What changed:
  medication strength
 
  how much to take
  how to take this
  
 losartan 25 MG tablet
 QTY:  30 tablet
 Refills:  0
 Commonly known as:  COZAAR
 Take 1 tablet by mouth daily.
 What changed:  how much to take
  
  
 CONTINUE taking these medications  
 albuterol 108 (90 Base) MCG/ACT inhaler
 Refills:  0
 Commonly known as:  PROVENTIL HFA;VENTOLIN HFA
  
 apixaban 2.5 MG tablet
 QTY:  60 tablet
 Refills:  0
 Commonly known as:  ELIQUIS
 Take 1 tablet by mouth 2 (two) times daily.
  
 aspirin 81 MG EC tablet
 QTY:  30 tablet
 Refills:  0
 Take 1 tablet by mouth daily with breakfast.
  
 calcium acetate 667 MG capsule
 Refills:  0
 Commonly known as:  PHOSLO
  
 carvedilol 3.125 MG tablet
 Refills:  0
 Commonly known as:  COREG
  
 clopidogrel 75 MG tablet
 QTY:  30 tablet
 Refills:  11
 Commonly known as:  PLAVIX
 Take 1 tablet by mouth daily.
  
 esomeprazole 20 MG capsule
 Refills:  0
 Commonly known as:  NEXIUM
  
 furosemide 20 MG tablet
 Refills:  0
 Commonly known as:  LASIX
  
 HYDROcodone-acetaminophen 5-325 MG per tablet
 QTY:  30 tablet
 Refills:  0
 Commonly known as:  NORCO
 Take 1 tablet by mouth every 4 (four) hours as needed.
  
 insulin aspart 100 UNIT/ML injection
 Refills:  0
 Commonly known as:  NOVOLOG
  
 insulin degludec 100 UNIT/ML injection
 
 Refills:  0
 Commonly known as:  TRESIBA
  
 isosorbide mononitrate 60 MG 24 hr tablet
 QTY:  30 tablet
 Refills:  1
 Commonly known as:  IMDUR
 Take 1 tablet by mouth daily.
  
 loperamide 2 MG capsule
 Refills:  0
 Commonly known as:  IMODIUM
  
 LORazepam 1 MG tablet
 QTY:  20 tablet
 Refills:  0
 Commonly known as:  ATIVAN
 Take 1 tablet by mouth daily as needed for Anxiety.
  
 nitroGLYCERIN 0.4 MG SL tablet
 QTY:  30 tablet
 Refills:  0
 Doctor's comments:  Hold for SBP less than 100
 Commonly known as:  NITROSTAT
 Place 1 tablet under the tongue every 5 (five) minutes as needed for Chest pain.
  
 nortriptyline 25 MG capsule
 Refills:  0
 Commonly known as:  PAMELOR
  
 ondansetron 4 MG disintegrating tablet
 Refills:  0
 Commonly known as:  ZOFRAN-ODT
  
 oxybutynin 5 MG tablet
 Refills:  0
 Commonly known as:  DITROPAN
  
 prochlorperazine 5 MG tablet
 Refills:  0
  
 rOPINIRole 0.25 MG tablet
 Refills:  0
 Commonly known as:  REQUIP
  
 SLOW-MAG 71.5-119 MG Tbec
 Refills:  0
 Generic drug:  Magnesium Cl-Calcium Carbonate
  
 Vitamin D3 5000 units Caps
 Refills:  0
  
 Vortioxetine HBr 10 MG Tabs
 Refills:  0
  
  
 You might also be taking other medications not listed above. If you have questions about an
y of your other medications, talk to the person who prescribed them or your Primary Care Pro
vider. 
  
 
  
  
 STOP taking these medications  
 ranitidine 150 MG tablet
 Commonly known as:  ZANTAC
  
  
  
 Where to Get Your Medications 
  
 These medications were sent to [facility] [address] [place], [place] - [address] [facility]
  [address] [place] [address] [place] [address] 
  Phone:  [phone] 
  atorvastatin 40 MG tablet
  
 You can get these medications from any pharmacy  
 Bring a paper prescription for each of these medications
  CefTAZidime and Dextrose 2-5 GM-%(50ML) Solr
  HYDROcodone-acetaminophen 5-325 MG per tablet
  LORazepam 1 MG tablet
  vancomycin 1000 mg injection
  
 
 Winston Vincent DO
 2019 3:25 PM
 
 Discharge took more than 35 minutes, to include final examination, discussion of admission,
 and preparation of prescriptions, instructions for ongoing care, follow up and dictation of
 summary.
 in this encounter
 
 Discharge Instructions
 Winston Vincent DO - 2019Follow up with outpatient cardiology, podiatry, Nephrology, GI 
and ID. 
 Diet: cardiac,renal, diabetic 1800 calorie in this encounter
 
 Medications at Time of Discharge
 
 
+----------------------+----------------------+-----------+---------+----------+-----------+
| Medication           | Sig.                 | Disp.     | Refills | Start    | End Date  |
|                      |                      |           |         | Date     |           |
+----------------------+----------------------+-----------+---------+----------+-----------+
|   albuterol          | Inhale 2 puffs into  |           |         |          |           |
| (PROVENTIL           | the lungs every 4    |           |         |          |           |
| HFA;VENTOLIN HFA)    | (four) hours as      |           |         |          |           |
| 108 (90 Base)        | needed for Wheezing. |           |         |          |           |
| MCG/ACT              |                      |           |         |          |           |
| inhalerIndications:  |                      |           |         |          |           |
| states uses 2x       |                      |           |         |          |           |
| monthly average      |                      |           |         |          |           |
+----------------------+----------------------+-----------+---------+----------+-----------+
|   esomeprazole       | Take 1 capsule by    |           |         |          |           |
| (NEXIUM) 40 MG       | mouth every day      |           |         |          |           |
| capsule              |                      |           |         |          |           |
+----------------------+----------------------+-----------+---------+----------+-----------+
|   insulin aspart     | Inject  into the     |           |         |          |           |
| (NOVOLOG) 100        | skin 3 (three) times |           |         |          |           |
| UNIT/ML injection    |  daily before meals. |           |         |          |           |
|                      |  Sliding scale       |           |         |          |           |
 
+----------------------+----------------------+-----------+---------+----------+-----------+
|   isosorbide         | Take 1 tablet by     |   30      | 1       | 20 |  |
| mononitrate (IMDUR)  | mouth daily.         | tablet    |         | 18       | 9         |
| 60 MG 24 hr tablet   |                      |           |         |          |           |
+----------------------+----------------------+-----------+---------+----------+-----------+
|   losartan (COZAAR)  | Take 1 tablet by     |   30      | 0       | 20 |           |
| 25 MG tablet         | mouth daily.         | tablet    |         | 18       |           |
+----------------------+----------------------+-----------+---------+----------+-----------+
|   nortriptyline      | Take 25-75 mg by     |           |         |          |           |
| (PAMELOR) 25 MG      | mouth See Admin      |           |         |          |           |
| capsule              | Instructions. Takes  |           |         |          |           |
|                      | 25 mg by mouth every |           |         |          |           |
|                      |  morning and 75 mg   |           |         |          |           |
|                      | every night          |           |         |          |           |
+----------------------+----------------------+-----------+---------+----------+-----------+
|   ondansetron        | Take 4 mg by mouth   |           |         | 20 |           |
| (ZOFRAN-ODT) 4 MG    | every 8 (eight)      |           |         | 18       |           |
| disintegrating       | hours as needed.     |           |         |          |           |
| tablet               |                      |           |         |          |           |
+----------------------+----------------------+-----------+---------+----------+-----------+
|   oxybutynin         | Take 7.5 mg by mouth |           |         |          |           |
| (DITROPAN) 5 MG      |  2 (two) times       |           |         |          |           |
| tablet               | daily.               |           |         |          |           |
+----------------------+----------------------+-----------+---------+----------+-----------+
|   rOPINIRole         | Take 0.75 mg by      |           |         |          |           |
| (REQUIP) 0.25 MG     | mouth nightly.       |           |         |          |           |
| tablet               |                      |           |         |          |           |
+----------------------+----------------------+-----------+---------+----------+-----------+
|   apixaban (ELIQUIS) | Take 1 tablet by     |   60      | 0       | 20 |           |
|  2.5 MG tablet       | mouth 2 (two) times  | tablet    |         | 18       |           |
|                      | daily.               |           |         |          |           |
+----------------------+----------------------+-----------+---------+----------+-----------+
|                      | Take 1 tablet by     |   30      | 0       | 20 |           |
| HYDROcodone-acetamin | mouth every 4 (four) | tablet    |         | 19       |           |
| ophen (NORCO) 5-325  |  hours as needed.    |           |         |          |           |
| MG per tablet        |                      |           |         |          |           |
+----------------------+----------------------+-----------+---------+----------+-----------+
|   insulin degludec   | Inject 21 units      |           |         |          |           |
| (TRESIBA) 100        | every night          |           |         |          |           |
| UNIT/ML injection    |                      |           |         |          |           |
+----------------------+----------------------+-----------+---------+----------+-----------+
|   calcium acetate    | 667 mg 3 (three)     |           |         | 20 |  |
| (PHOSLO) 667 MG      | times daily with     |           |         | 16       | 9         |
| capsule              | meals.               |           |         |          |           |
+----------------------+----------------------+-----------+---------+----------+-----------+
|   carvedilol (COREG) | Take 3.125 mg by     |           |         |          |  |
|  3.125 MG tablet     | mouth 2 (two) times  |           |         |          | 9         |
|                      | daily with meals.    |           |         |          |           |
+----------------------+----------------------+-----------+---------+----------+-----------+
|   furosemide (LASIX) | TAKE 1 TABLET BY     |           |         | 20 |  |
|  20 MG tablet        | MOUTH ONCE DAILY     |           |         | 18       | 9         |
+----------------------+----------------------+-----------+---------+----------+-----------+
|   loperamide         | 2 mg as needed.      |           |         |          |  |
| (IMODIUM) 2 MG       |                      |           |         |          | 9         |
| capsule              |                      |           |         |          |           |
+----------------------+----------------------+-----------+---------+----------+-----------+
|   prochlorperazine   | Take 5 mg by mouth 2 |           |         |          |  |
| (COMPAZINE) 5 MG     |  (two) times daily   |           |         |          | 9         |
| tabletIndications:   | as needed for        |           |         |          |           |
| Severe Nausea and    | Nausea. Indications: |           |         |          |           |
 
| Vomiting             |  Severe Nausea and   |           |         |          |           |
|                      | Vomiting             |           |         |          |           |
+----------------------+----------------------+-----------+---------+----------+-----------+
|   Vortioxetine HBr   | 10 mg nightly.       |           |         |          |  |
| 10 MG TABS           |                      |           |         |          | 9         |
+----------------------+----------------------+-----------+---------+----------+-----------+
|   aspirin 81 MG EC   | Take 1 tablet by     |   30      | 0       | 07/10/20 |  |
| tablet               | mouth daily with     | tablet    |         | 17       | 9         |
|                      | breakfast.           |           |         |          |           |
+----------------------+----------------------+-----------+---------+----------+-----------+
|   atorvastatin       | Take 1 tablet by     |   30      | 0       | 20 |  |
| (LIPITOR) 40 MG      | mouth nightly.       | tablet    |         | 19       | 9         |
| tablet               |                      |           |         |          |           |
+----------------------+----------------------+-----------+---------+----------+-----------+
|   CefTAZidime and    | Ceftazidime 2 gm iv  |   30 each | 2       | 20 |  |
| Dextrose 2-5         | after each           |           |         | 18       | 9         |
| GM-%(50ML) SOLR      | hemodialysis until   |           |         |          |           |
|                      | 19              |           |         |          |           |
+----------------------+----------------------+-----------+---------+----------+-----------+
|   Cholecalciferol    | Take 5,000 capsules  |           |         |          |  |
| (VITAMIN D3) 5000    | by mouth every other |           |         |          | 9         |
| UNITS CAPS           |  day.                |           |         |          |           |
+----------------------+----------------------+-----------+---------+----------+-----------+
|   clopidogrel        | Take 1 tablet by     |   30      | 11      | 20 |  |
| (PLAVIX) 75 MG       | mouth daily.         | tablet    |         | 18       | 9         |
| tablet               |                      |           |         |          |           |
+----------------------+----------------------+-----------+---------+----------+-----------+
|   LORazepam (ATIVAN) | Take 1 tablet by     |   20      | 0       | 20 |  |
|  1 MG tablet         | mouth daily as       | tablet    |         | 19       | 9         |
|                      | needed for Anxiety.  |           |         |          |           |
+----------------------+----------------------+-----------+---------+----------+-----------+
|   Magnesium          | Take 1 tablet by     |           |         |          |  |
| Cl-Calcium Carbonate | mouth every other    |           |         |          | 9         |
|  (SLOW-MAG) 71.5-119 | day.                 |           |         |          |           |
|  MG TBEC             |                      |           |         |          |           |
+----------------------+----------------------+-----------+---------+----------+-----------+
|   nitroGLYCERIN      | Place 1 tablet under |   30      | 0       | 07/10/20 |  |
| (NITROSTAT) 0.4 MG   |  the tongue every 5  | tablet    |         | 17       | 9         |
| SL tablet            | (five) minutes as    |           |         |          |           |
|                      | needed for Chest     |           |         |          |           |
|                      | pain.                |           |         |          |           |
+----------------------+----------------------+-----------+---------+----------+-----------+
|   vancomycin         | 500 mg iv after each |   7 each  | 3       | 20 |  |
| (VANCOCIN) 1000 mg   |  hemodialysis until  |           |         | 18       | 9         |
| injection            | 19Cb cmp esr   |           |         |          |           |
|                      | crp vancomycin       |           |         |          |           |
|                      | trough q             |           |         |          |           |
|                      | MondayFollow up with |           |         |          |           |
|                      |  DR. Elliott in 2       |           |         |          |           |
|                      | weeksCall Tel        |           |         |          |           |
|                      | [phone] to      |           |         |          |           |
|                      | schedule follow up   |           |         |          |           |
|                      | Fax lab results to   |           |         |          |           |
|                      | [phone]         |           |         |          |           |
+----------------------+----------------------+-----------+---------+----------+-----------+
 as of this encounter
 
 Progress Notes
 Negrito Metz MD - 2019 10:16 AM PSTFormatting of this note may be different from the
 original.
 
 [facility]
 Service:  NEPHROLOGY
 Progress Note
 
 Cherie Villalobos   68 y.o.  [id number]
 428/428-1 female   Ivy Couch
 Hospital Day:   LOS: 7 days 
 Patient with PMH as listed below admitted s/p L TMA by Dr Jordan 18
 Nephrology consulted for evaluation and management of  ARF / CKD / ESRD 
 CHRONIC
 SEVERE 
 ASSOCIATED WITH FLUID ELECTROLYTE IMBALANCES
 Assess need for hd/uf
 
 SUBJECTIVE
 Patient seen and examined 
 SAYS FEEL OK, plan to go to [facility] in [facility] but dialysis still at [facility]
 DENIES CHEST PAIN, SOB, NAUSEA, VOMITING, DIARRHEA, FEVER, CHILLS, RASH, COUGH, HEADACHE
 
 Past Medical History 
 Diagnosis Date 
   Acute MI (formerly [facility])  
  with stenting x 1 
   Anemia associated with chronic renal failure 2016 
   Atrial fibrillation (HCC)  
   Chronic kidney disease  
   Congestive heart failure (formerly [facility])  
   COPD (chronic obstructive pulmonary disease) (formerly [facility])  
   COPD (chronic obstructive pulmonary disease) (formerly [facility]) 2018 
   Coronary artery disease  
  stent placements: 3 total 
   Depression  
   Diabetes other 
  abstracted 
   Diabetes mellitus, type 2 (HCC)  
   ESRD on peritoneal dialysis (HCC)  
   GERD (gastroesophageal reflux disease)  
   Hemodialysis patient (formerly [facility]) 10/2016 
  Stopped peritoneal and restarted hemodialysis 
   Hemorrhage of gastrointestinal tract, unspecified 2017 
  low GI, rectal bleeding 
   High blood pressure other 
  abstracted 
   High cholesterol other 
  abstracted 
   Hyperlipidemia  
   Hypertension  
   Hyponatremia 2017 
   Myocardial infarction (formerly [facility]) 2017 
   Other chronic pain  
  feet,  
   Pain of left hip joint 2016 
  due to hip fracture and replacement 
   Partial small bowel obstruction (formerly [facility]) 2017 
  resolved 
   Renal failure  
  Stage 5.   Fistula in the JAN - not on dialysis yet. 
   Unspecified visual disturbance  
  glasses 
  
 
 Past Surgical History 
 Procedure Laterality Date 
   AV FISTULA PLACEMENT   
  left upper arm AV fistula - failed 
   AV FISTULA REPAIR N/A 10/25/2016 
  Procedure: AV FISTULA - GRAFT REPAIR/REVISION;  Surgeon: Kirt Mclaughlin MD;  Location: [facility]
IN OR;  Service: Vascular;  Laterality: N/A;  Superficialization and revision of left arm fi
stula 
   CARDIAC CATHETERIZATION  2017 
   CARPAL TUNNEL RELEASE Bilateral other 
  abstracted 
   CATARACT EXTRACTION Bilateral 2007 
   CATHETER REMOVAL N/A 2016 
  Procedure: DIALYSIS CATHETER - REMOVAL;  Surgeon: Kirt Mclaughlin MD;  Location: [facility] MAIN OR; 
 Service: Vascular;  Laterality: N/A;  PD cath removal 
   CHOLECYSTECTOMY  other 
  abstracted 
   COLONOSCOPY   
   CORONARY ARTERY BYPASS GRAFT   
   CORONARY STENT PLACEMENT  2017 
  Drug Elutin stents to OM, 1 stent to prox. LAD 
   EYE SURGERY   
   FOOT AMPUTATION Left 2018 
  Procedure: FOREFOOT - AMPUTATION;  Surgeon: Srinivasan Jordan DPM;  Location: [facility] MAIN OR;
  Service: Podiatry;  Laterality: Left; 
   HARDWARE PRESENT   
  stent placements x 3, Left hip replacement, vascular stents 2 in left leg 
   HIP ARTHROPLASTY Left 2016 
  Procedure: HIP - ARTHROPLASTY(ALLISON);  Surgeon: Uriel Roa MD;  Location: [facility] MAIN 
OR;  Service: Orthopedics;  Laterality: Left; 
   HYSTERECTOMY   
  complete 
   PACEMAKER INSERTION   
  [employer] pacemaker/defib 
   PERITONEAL CATHETER INSERTION  8/15/2013 
   PERITONEAL CATHETER INSERTION N/A 2016 
  Procedure: LAPAROSCOPIC - PERITONEAL DIALYSIS CATH INSERTION;  Surgeon: Kirt Mclaughlin MD;  Lo
cation: [facility] MAIN OR;  Service: Vascular;  Laterality: N/A;  Removal of old catheter 
   SHOULDER SURGERY Right  
  frozen shoulder 
   UNLISTED PROCEDURE ARTHROSCOPY   
  total Left hip 
   vascular stents Left 2017 
  leg (2 stents) 
   VASCULAR SURGERY   
  
 Family History 
 Problem Relation Age of Onset 
   High cholesterol Father  
   Hypertension Father  
   Diabetes Paternal Grandmother  
   Malig hypertherm Neg Hx  
   Kidney disease Neg Hx  
  
 Social History 
 
 Social History 
   Marital status:  
   Spouse name: N/A 
   Number of children: N/A 
 
   Years of education: N/A 
 
 Occupational History 
   Not on file. 
 
 Social History Main Topics 
   Smoking status: Former Smoker 
   Packs/day: 1.00 
   Years: 30.00 
   Types: Cigarettes 
   Quit date: 2004 
   Smokeless tobacco: Never Used 
    Comment: quite smoking  
   Alcohol use No 
   Drug use: No 
   Sexual activity: No 
 
 Other Topics Concern 
   Not on file 
 
 Social History Narrative 
  She lives with . 2 kids. Worked in banking 
  
 
 Scheduled Medications 
   apixaban  2.5 mg Oral BID 
   aspirin  81 mg Oral Daily with breakfast 
   atorvastatin  40 mg Oral Nightly 
   calcium acetate  667 mg Oral TID WC 
   carvedilol  3.125 mg Oral BID WC 
   cefTAZidime  2 g Intravenous After Hemodialysis (see admin instructions) 
   clopidogrel  75 mg Oral Daily 
   furosemide  20 mg Oral Daily 
   insulin glargine  20 Units Subcutaneous Nightly 
   insulin lispro (human)  0-10 Units Subcutaneous TID AC 
   insulin lispro (human)  0-5 Units Subcutaneous Nightly 
   isosorbide mononitrate  60 mg Oral Daily 
   losartan  100 mg Oral Daily 
   nortriptyline  25 mg Oral Daily 
   nortriptyline  50 mg Oral Nightly 
   oxybutynin  2.5 mg Oral BID 
   pantoprazole  40 mg Oral QAM AC 
   rOPINIRole  0.75 mg Oral Nightly 
   vancomycin  750 mg Intravenous See Admin Instructions 
 
 Continuous Infusions 
   dextrose   
 
 PRN Medications
 acetaminophen **OR** acetaminophen, albumin human, albuterol, bisacodyl, dextrose, dextrose
, dextrose, glucagon, glucagon, HYDROcodone-acetaminophen **OR** HYDROcodone-acetaminophen, 
loperamide, LORazepam, nitroGLYCERIN, ondansetron **OR** ondansetron, polyethylene glycol, p
rochlorperazine, saline lock IV - when tolerating PO fluids **AND** sodium chloride (PF), so
dium chloride
 
 Allergy:
 Allergies 
 Allergen Reactions 
   Quinapril Hcl Anaphylaxis 
   Digoxin And Related Other (See Comments) 
 
   toxic 
   Metaxalone Other (See Comments) 
   Morphine Mental Changes 
   Make her crazy/ "funny feeling in my head"
 Has used hydromorphone in-house 
   Pantoprazole Sodium Nausea and Vomiting 
   Penicillins Rash 
   Quinapril Hcl Edema 
   Facial Edema 
   Sudafed [Pseudoephedrine Hcl] Rash 
 
 OBJECTIVE
 Vital Signs:
 /61 (BP Location: Right upper arm)  | Pulse 80  | Temp 98.7 F (37.1 C) (Oral)  | 
Resp 16  | Ht 1.778 m (5' 10")  | Wt 68.7 kg (151 lb 7.3 oz)  | SpO2 99%  | BMI 21.73 kg/m
 
 
 I&O Detailed Table: I/O last 3 completed shifts:
 In: 1600 [P.O.:600; Other:1000]
 Out: 2850 [Urine:850; Other:2000]
 Weight change: 
 
 Examination:
 APPEARANCE: The patient is lying in no apparent distress, awake and alert
 VITALS:     Reviewed as listed. 
 EYES:        Non-icteric sclera.  
 LUNGS:      Clear to auscultation bilaterally, no respiratory distress. 
 HEART:      S1, S2, no pericardial rub noted.
 ABDOMEN:    Full, soft, no tenderness.   
 EXTREMITIES: no pedal edema noted.  LLE dressed 
 NEUROLOGIC: No gross focal motor deficit noted,  no Asterixis.  
 PSYCH: The patient is alert and oriented x 3, mood and affect looks ok
 
 L ARM AVF +ve thrill/ bruit
 
 LABS:
 Recent Results (from the past 24 hour(s)) 
 POCT glucose 
  Collection Time: 19 11:00 AM 
 Result Value Ref Range 
  GLUCOSE,POC SCREEN 84 65 - 99 mg/dL 
 POCT glucose 
  Collection Time: 19  4:09 PM 
 Result Value Ref Range 
  GLUCOSE,POC SCREEN 192 (H) 65 - 99 mg/dL 
 POCT glucose 
  Collection Time: 19  8:58 PM 
 Result Value Ref Range 
  GLUCOSE,POC SCREEN 122 (H) 65 - 99 mg/dL 
 CBC W/Auto Diff (Reflex to Manual) 
  Collection Time: 19  4:58 AM 
 Result Value Ref Range 
  WBC 5.21 3.80 - 11.00 K/uL 
  RBC 2.54 (L) 3.70 - 5.10 M/uL 
  HGB 8.7 (L) 11.3 - 15.5 g/dL 
  HCT 26.3 (L) 34.0 - 46.0 % 
  .9 (H) 80.0 - 100.0 fl 
  MCH 34.4 (H) 27.0 - 34.0 pg 
  MCHC 33.1 32.0 - 35.5 g/dL 
  RDW SD 61.7 (H) 37 - 53 fl 
 
   150 - 400 K/uL 
  MPV 9.3 fl 
  DIFF TYPE AUTOMATED  
  NEUTROPHILS 69.68 % 
  LYMPHOCYTES 19.86 % 
  MONOCYTES 9.79 % 
  EOSINOPHILS 0.00 % 
  BASOPHILS 0.67 % 
  NEUTROPHILS ABS 3.63 1.90 - 7.40 K/uL 
  LYMPHOCYTES ABS 1.03 1.00 - 3.90 K/uL 
  MONOCYTES ABS 0.51 0.00 - 0.80 K/uL 
  EOSINOPHILS ABS 0.00 0.00 - 0.50 K/uL 
  BASOPHILS ABS 0.04 0.00 - 0.10 K/uL 
 Comprehensive metabolic panel 
  Collection Time: 19  4:58 AM 
 Result Value Ref Range 
  SODIUM 137 135 - 145 mmol/L 
  POTASSIUM 4.5 3.5 - 4.9 mmol/L 
  CHLORIDE 99 99 - 109 mmol/L 
  CO2 31 23 - 32 mmol/L 
  ANION GAP AGAP 12 5 - 20 mmol/L 
  GLUCOSE 103 (H) 65 - 99 mg/dL 
  BUN 13 8 - 25 mg/dL 
  CREATININE 3.5 (H) 0.50 - 1.00 mg/dL 
  BUN/CREAT 4  
  CALCIUM 9.0 8.5 - 10.5 mg/dL 
  TOTAL PROTEIN 6.3 6.3 - 8.2 g/dL 
  Albumin 2.6 (L) 3.3 - 4.8 g/dL 
  GLOBULIN 3.7 1.3 - 4.9 g/dL 
  A/G 0.7 (L) 1.0 - 2.4 
  TBIL 0.4 0.1 - 1.5 mg/dL 
  ALK PHOS 85 35 - 115 U/L 
  AST 23 10 - 45 U/L 
  ALT 17 10 - 65 U/L 
  EGFR 13 (L) >60 mL/min/1.73m2 
 POCT glucose 
  Collection Time: 19  5:39 AM 
 Result Value Ref Range 
  GLUCOSE,POC SCREEN 110 (H) 65 - 99 mg/dL 
 
 Imaging
 Ct Head Without Contrast
 
 Result Date: 2018
 CHERIE CHIU DEYSI 1950 68 years Female CT HEAD WO CONTRAST 2018 5:23 PM INDICA
TION: Altered mental status in a patient with acute osteomyelitis COMPARISON: Head CT, 2017 TECHNIQUE: CT scan of the head without contrast.  5-mm axial noncontrast images were ac
quired from the foramen magnum through the cranial vertex. Multiplanar reformations were obt
ained from the acquisition data. At least one of the following CT dose optimization techniqu
es were used: Automated exposure control; Adjustment of mA and/or kV according to patient si
ze; Use of iterative reconstruction technique. FINDINGS: Brain: No intracranial hemorrhage, 
midline shift or pathologic mass effect. No cerebral edema, mass lesion or evidence of acute
 infarct. Ventricles and extra-axial fluid spaces: Normal. Paranasal sinuses and mastoid air
 cells: Normal. Calvarium and extracranial soft tissues: Normal. Orbits: The patient is stat
us post bilateral cataract surgery. 
 
 1.  No acute intracranial pathology. Electronically signed by Steven Samano MD on  5:28 PM
 
 X-ray Chest 1 View
 
 
 Result Date: 2019
 CHERIE LEBLANCEN XR CHEST 1 VIEW 2019 4:43 PM HISTORY: 68 years.  Female.  Chest pa
in. TECHNIQUE: 1 view of the chest obtained at 1637 hours. COMPARISON: 2018. FINDINGS:
 A single lead ICD is seen over the right chest with an intact lead in proper position uncha
nged from the previous exam.  The sternotomy wires from prior CABG are noted.  An overlying 
EKG leads is seen.  A presumed loculated pleural fluid collection is seen overlying the left
 heart border similar to the previous exam.  This measures 3.0 x 5.2 cm in the transverse an
d craniocaudal dimensions.  Hazy opacity is seen in the lower right chest which may indicate
 the presence of a right-sided pleural effusion which is layering posteriorly.  No acute air
space disease, parenchymal nodule, mass or pneumothorax is noted.  Apical pleural thickening
 is seen.  The osseous structures are intact. 
 
 1.  Small loculated pleural fluid collection seen overlying the lateral left heart border i
n the lower left chest.  There may also be a small pleural effusion at the right lung base w
hich is layering posteriorly. 2.  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 Electronically s
igned by Eugenio Hoffman DO on 2019 4:59 PM
 
 PROBLEM LIST
 
 Principal Problem:
   Osteomyelitis of left foot (HCC)
 Active Problems:
   Secondary hyperparathyroidism (HCC)
   Depression
   ESRD (end stage renal disease) (HCC)
   Type 2 diabetes mellitus with chronic kidney disease on chronic dialysis, with long-term 
current use of insulin (HCC)
   Anemia in ESRD (end-stage renal disease) (HCC)
   Hypertension, essential
   Diabetic foot ulcer (HCC)
   Chronic systolic congestive heart failure (HCC)
   Chronic anticoagulation
   Cardiomyopathy (HCC)
   Paroxysmal atrial fibrillation (HCC)
   S/P CABG x 2
   S/P mitral valve repair
   PVD (peripheral vascular disease) (HCC)
   CAD (coronary artery disease)
  
 
 ASSESSMENT & PLAN
 
 ESRD ON HD
 No need for hd/uf today
 Assess daily for need for hd & uf 
 hd/ uf in am
 Orders in the chart
 Fluid restriction 1.2 litres/24 hours
 Strict I & O
 Daily RFP
 Renal diet
 Daily weights will help with subsequent hd with uf
 Dose all meds per protocol for ESRD on hd
 
 ANEMIA In esrd   stable
 EPOGEN TO KEEP HGB 10-11
 Lab Results 
 Component Value Date 
  HGB 8.7 (L) 2019 
 
  HGB 8.4 (L) 2019 
  HGB 8.7 (L) 2019 
  FERRITIN 722 (H) 2017 
  FERRITIN 793 (H) 2016 
  FERRITIN 883 (H) 2016 
  LABIRON 28 2017 
  LABIRON 17 2016 
  LABIRON 31 2016 
 
 HYPERTENSION  Controlled 
 WATCH BP CLOSELY DURING HOSPITALIZATION 
 LOW NA DIET
 Will adjust BP meds according to BP readings
 BP Readings from Last 3 Encounters: 
 19 127/61 
 18 139/63 
 18 111/61 
 
 DM-2 
 Optimum Glycemic Control 
 
 HYPERPHOSPHATEMIA  Improving 
 LOW P DIET 
 PO4 BINDERS  Ca acetate with meals
 Lab Results 
 Component Value Date 
  PHOS 4.9 (H) 2018 
  PHOS 5.3 (H) 2018 
  PHOS 5.3 (H) 2018 
 
 HYPOALBUMINEMIA
 INCREASE PROTEIN INTAKE
 Lab Results 
 Component Value Date 
  ALB 2.6 (L) 2019 
  ALB 2.4 (L) 2019 
  ALB 2.5 (L) 2019 
 
 s/p L foot TMA by Dr Jordan 18
 
 CASE DISCUSSED IN DETAIL WITH PATIENT/ care team, ANSWERS ALL QUESTIONS IN DETAIL, VERBALIZ
ES UNDERSTANDING
 
 NEGRITO TRISTIN METZ MD
 2019
 
 Ivy Couch
 
 Seen earlier  and charting completed later  
 Dictation software, Dragon, used which may contain error for similar sounding words even af
ter review. Personal communication requested for any clarification. 
 Portions of my notes may have been carried over for continuity of care.
 
 Andrés Elliott MD - 2019  9:18 AM PSTFormatting of this note may be different from the 
original.
 
 
 CONSULT NOTE
 2019
 9:18 AM
 
 
 PRIMARY PHYSICIAN
 Ivy Couch
 
 CONSULT REQUEST FROM
 Winston Vincent DO
 
 HISTORY OF PRESENT ILLNESS
 The patient is a 68 y.o.-year-old female  with history of ESRD on HD via fistula Tue/Thu/Sa
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
 CABG. Recent LLE angioplasty few weeks ago. She had been on  antibiotics for L foot first d
igit infection with possible osteomyelitis since November (Oral levofloxacin and clindamycin
 then transitioned to IV cefepime with HD until 18, no improvement noted on antibiotics
). She had followed with podiatrist and underwent L TMA on 18, intraoperative findings
 encountered purulence at first metatarsal. 
 Admitted on 18 to [facility]. 
 Wound culture shows skin marzena.
 DAy 8 of iv vancomycin and  Day 4 of ceftazidime
 Had 4 days of cefepime 
 Will complete to iv vancmycin 500 mg iv and ceftazidime 2 gm iv after each hemodialysis uni
tluntil 18 for the infected foot.  
 The patient has problem with tremors on the hands  And on and off confusion. 
 Ct head negative on 18
 EEG done show no seizure just diffuse slowing .
 The patient has some nausea and on and off chest pain stable.
 The patient felt better for possible discharge today    
 Past Medical History 
 Diagnosis Date 
   Acute MI (formerly [facility])  
  with stenting x 1 
   Anemia associated with chronic renal failure 2016 
   Atrial fibrillation (formerly [facility])  
   Chronic kidney disease  
   Congestive heart failure (formerly [facility])  
   COPD (chronic obstructive pulmonary disease) (formerly [facility])  
   COPD (chronic obstructive pulmonary disease) (formerly [facility]) 2018 
   Coronary artery disease  
  stent placements: 3 total 
   Depression  
   Diabetes other 
  abstracted 
   Diabetes mellitus, type 2 (formerly [facility])  
   ESRD on peritoneal dialysis (formerly [facility])  
   GERD (gastroesophageal reflux disease)  
   Hemodialysis patient (formerly [facility]) 10/2016 
  Stopped peritoneal and restarted hemodialysis 
   Hemorrhage of gastrointestinal tract, unspecified 2017 
  low GI, rectal bleeding 
   High blood pressure other 
  abstracted 
   High cholesterol other 
  abstracted 
   Hyperlipidemia  
   Hypertension  
   Hyponatremia 2017 
   Myocardial infarction (formerly [facility]) 2017 
   Other chronic pain  
  feet,  
   Pain of left hip joint 2016 
  due to hip fracture and replacement 
 
   Partial small bowel obstruction (formerly [facility]) 2017 
  resolved 
   Renal failure  
  Stage 5.   Fistula in the JAN - not on dialysis yet. 
   Unspecified visual disturbance  
  glasses 
 
 Past Surgical History 
 Procedure Laterality Date 
   AV FISTULA PLACEMENT   
  left upper arm AV fistula - failed 
   AV FISTULA REPAIR N/A 10/25/2016 
  Procedure: AV FISTULA - GRAFT REPAIR/REVISION;  Surgeon: Kirt Mclaughlin MD;  Location: [facility]
IN OR;  Service: Vascular;  Laterality: N/A;  Superficialization and revision of left arm fi
stula 
   CARDIAC CATHETERIZATION  2017 
   CARPAL TUNNEL RELEASE Bilateral other 
  abstracted 
   CATARACT EXTRACTION Bilateral 2007 
   CATHETER REMOVAL N/A 2016 
  Procedure: DIALYSIS CATHETER - REMOVAL;  Surgeon: Kirt Mclaughlin MD;  Location: [facility] OR; 
 Service: Vascular;  Laterality: N/A;  PD cath removal 
   CHOLECYSTECTOMY  other 
  abstracted 
   COLONOSCOPY   
   CORONARY ARTERY BYPASS GRAFT   
   CORONARY STENT PLACEMENT  2017 
  Drug Elutin stents to OM, 1 stent to prox. LAD 
   EYE SURGERY   
   FOOT AMPUTATION Left 2018 
  Procedure: FOREFOOT - AMPUTATION;  Surgeon: Srinivasan Jordan DPM;  Location: [facility] MAIN OR;
  Service: Podiatry;  Laterality: Left; 
   HARDWARE PRESENT   
  stent placements x 3, Left hip replacement, vascular stents 2 in left leg 
   HIP ARTHROPLASTY Left 2016 
  Procedure: HIP - ARTHROPLASTY(ALLISON);  Surgeon: Uriel Roa MD;  Location: [facility] MAIN 
OR;  Service: Orthopedics;  Laterality: Left; 
   HYSTERECTOMY  1998 
  complete 
   PACEMAKER INSERTION   
  [employer] pacemaker/defib 
   PERITONEAL CATHETER INSERTION  8/15/2013 
   PERITONEAL CATHETER INSERTION N/A 2016 
  Procedure: LAPAROSCOPIC - PERITONEAL DIALYSIS CATH INSERTION;  Surgeon: Kirt Mclaughlin MD;  Lo
cation: [facility] MAIN OR;  Service: Vascular;  Laterality: N/A;  Removal of old catheter 
   SHOULDER SURGERY Right  
  frozen shoulder 
   UNLISTED PROCEDURE ARTHROSCOPY   
  total Left hip 
   vascular stents Left 2017 
  leg (2 stents) 
   VASCULAR SURGERY   
 
 Current Facility-Administered Medications 
 Medication Dose Route Frequency Provider Last Rate Last Dose 
   acetaminophen (TYLENOL) tablet 650 mg  650 mg Oral Q6H PRN Srinivasan Jordan DPM     
  Or 
   acetaminophen (TYLENOL) suppository 650 mg  650 mg Rectal Q6H PRN Srinivasan Jordan DPM 
    
   albumin human 25 % solution 12.5 g  12.5 g Intravenous Q1H PRN Michelle Awad MD   12.5
 
 g at 19 1545 
   albuterol (PROVENTIL HFA;VENTOLIN HFA) inhaler  2 puff Inhalation Q4H PRN Srinivasan Dhillon
anREBEKAH     
   apixaban (ELIQUIS) tablet 2.5 mg  2.5 mg Oral BID Srinivasan Jordan DPM   2.5 mg at  0811 
   aspirin EC tablet 81 mg  81 mg Oral Daily with breakfast Srinivasan Jordan DPM   81 mg a
t 19 0811 
   atorvastatin (LIPITOR) tablet 40 mg  40 mg Oral Nightly Margarita Luz, DO   40 mg at  2110 
   bisacodyl (DULCOLAX) suppository 10 mg  10 mg Rectal Daily PRN Winston Vincent DO     
   calcium acetate (PHOSLO) capsule 667 mg  667 mg Oral TID  Srinivasan Jordan DPM   667 
mg at 19 0811 
   carvedilol (COREG) tablet 3.125 mg  3.125 mg Oral BID  Srinivasan Jordan DPM   3.125 m
g at 19 0811 
   cefTAZidime (FORTAZ) 2 g in sodium chloride (IV) 0.9 % 50 mL IVPB  2 g Intravenous Afte
r Hemodialysis (see admin instructions) Andrés Elliott MD   2 g at 19 203 
   clopidogrel (PLAVIX) tablet 75 mg  75 mg Oral Daily Srinivasan Jordan DPM   75 mg at  0811 
   dextrose 10 % infusion   Intravenous Continuous PRN Srinivasan Jordan DPM     
   dextrose 50 % solution 12 mL  12 mL Intravenous PRN Srinivasan Jordan DPM     
   dextrose 50 % solution 25 mL  25 mL Intravenous PRN Srinivasan Jordan DPM     
   furosemide (LASIX) tablet 20 mg  20 mg Oral Daily Srinivasan Jordan DPM   20 mg at  0811 
   glucagon (GLUCAGEN) injection 0.5 mg  0.5 mg Intramuscular PRN Srinivasan Jordan DPM    
 
   glucagon (GLUCAGEN) injection 1 mg  1 mg Intramuscular PRN Srinivasan Jordan DPM     
   HYDROcodone-acetaminophen (NORCO) 5-325 MG per tablet 1 tablet  1 tablet Oral Q4H PRN JESSICA Jordan DPM   1 tablet at 19 0812 
  Or 
   HYDROcodone-acetaminophen (NORCO)  MG per tablet 1 tablet  1 tablet Oral Q4H PRN 
Srniivasan Jordan DPM   1 tablet at 19 1050 
   insulin glargine (LANTUS) injection 20 Units  20 Units Subcutaneous Nightly Augustin Bourne MD   20 Units at 19 2129 
   insulin lispro (human) (HUMALOG) injection 0-10 Units  0-10 Units Subcutaneous TID AC B
axel Jordan DPM   2 Units at 19 1610 
   insulin lispro (human) (HUMALOG) injection 0-5 Units  0-5 Units Subcutaneous Nightly Br
mario Jordan DPM   1 Units at 19 2144 
   isosorbide mononitrate (IMDUR) 24 hr tablet 60 mg  60 mg Oral Daily GELACIO Terry   60 mg at 19 0811 
   loperamide (IMODIUM) capsule 2 mg  2 mg Oral PRN Srinivasan Jordan DPM     
   LORazepam (ATIVAN) tablet 1 mg  1 mg Oral Daily PRN Srinivasan Jordan DPM   1 mg at 010
3/19 1625 
   losartan (COZAAR) tablet 100 mg  100 mg Oral Daily Srinivasan Jordan DPM   100 mg at  08 
   nitroGLYCERIN (NITROSTAT) SL tablet 0.4 mg  0.4 mg Sublingual Q5 Min PRN Srinivasan navas, DPM   0.4 mg at 19 0528 
   nortriptyline (PAMELOR) capsule 25 mg  25 mg Oral Daily Srinivasan Jordan DPM   25 mg at
 19 0811 
   nortriptyline (PAMELOR) capsule 50 mg  50 mg Oral Nightly Michelle Awad MD   50 mg at 
19 211 
   ondansetron (ZOFRAN-ODT) disintegrating tablet 4 mg  4 mg Oral Q6H PRN Srinivasan Jordan
 DPM   4 mg at 19 0839 
  Or 
   ondansetron (ZOFRAN) injection 4 mg  4 mg Intravenous Q6H PRN Srinivasan Jordan DPM   4 
mg at 19 2257 
   oxybutynin (DITROPAN) tablet 2.5 mg  2.5 mg Oral BID Srinivasan Jordan DPM   2.5 mg at 0
19 0811 
   pantoprazole (PROTONIX) EC tablet 40 mg  40 mg Oral QAM AC Srinivasan Jordan DPM   40 mg
 at 19 0541 
   polyethylene glycol (GLYCOLAX) packet 17 g  17 g Oral Daily PRN Srinivasan Jordan DPM   
 
17 g at 19 0837 
   prochlorperazine tablet 5 mg  5 mg Oral BID PRN Srinivasan Jordan DPM   5 mg at 19
 1135 
   rOPINIRole (REQUIP) tablet 0.75 mg  0.75 mg Oral Nightly Srinivasan Jordan DPM   0.75 mg
 at 19 211 
   sodium chloride (PF) 0.9 % flush 10 mL  10 mL Intravenous Q8H PRN Srinivasan Jordan DPM 
  10 mL at 18 0747 
   sodium chloride 0.9 % bolus 100 mL  100 mL Intravenous Q5 Min PRN Michelle Awad MD    
 
   vancomycin (VANCOCIN) 750 mg/250 mL IVPB  750 mg Intravenous See Admin Instructions Rene Rosado, RPH   750 mg at 19 
 
 Allergies 
 Allergen Reactions 
   Quinapril Hcl Anaphylaxis 
   Digoxin And Related Other (See Comments) 
   toxic 
   Metaxalone Other (See Comments) 
   Morphine Mental Changes 
   Make her crazy/ "funny feeling in my head"
 Has used hydromorphone in-house 
   Pantoprazole Sodium Nausea and Vomiting 
   Penicillins Rash 
   Quinapril Hcl Edema 
   Facial Edema 
   Sudafed [Pseudoephedrine Hcl] Rash 
 
 Social History 
 
 Social History 
   Marital status:  
   Spouse name: N/A 
   Number of children: N/A 
   Years of education: N/A 
 
 Occupational History 
   Not on file. 
 
 Social History Main Topics 
   Smoking status: Former Smoker 
   Packs/day: 1.00 
   Years: 30.00 
   Types: Cigarettes 
   Quit date: 2004 
   Smokeless tobacco: Never Used 
    Comment: quite smoking  
   Alcohol use No 
   Drug use: No 
   Sexual activity: No 
 
 Other Topics Concern 
   Not on file 
 
 Social History Narrative 
  She lives with . 2 kids. Worked in banking 
 
 No smoking. No alcohol intake. Recreational Drug Use
 NO Travel
 mp Pets
 Immunization History 
 
 Administered Date(s) Administered 
   Hepatitis B Adult 2016 
   Influenza, Trivalent W/Preservative 2016 
   Pneumococcal Polysaccharide 23-valent 2012 
  
 
 Pneumococcal
 Influenza
 
 Family History 
 Problem Relation Age of Onset 
   High cholesterol Father  
   Hypertension Father  
   Diabetes Paternal Grandmother  
   Malig hypertherm Neg Hx  
   Kidney disease Neg Hx  
 
 REVIEW OF SYSTEMS
 
 General: The patient noted to have no  problem of fever,no  weight loss
  and no chills.
 
 Neurologic: Denies any headache, any lightheadedness. No loss of
 
 consciousness or seizure.
 
 Cardiac: with  No Chest Pain,with no  Palpitation,no  Dyspnea on Exertion
 
 Pulmonary: Denies cough, wheezing and hemoptysis
 
 GASTROINTESTINAL: with  nausea no vomiting, and diarrhea.
 GENITOURINARY: Noted no dysuria, urgency, or frequency.
 Hematological : Denies any ecchymosis, bleeding or hematuria
 
 Endocrine: Denies any polyphagia, polyuria or hot and cold intolerance
  Musculoskeletal: no new joint pain and swelling. 
 Skin : Denies any new rashes or cutaneous changes.
 
 Rest of the review of systems is unremarkable.
 
 PHYSICAL EXAMINATION
 
 VITAL SIGNS 
 Wt Readings from Last 3 Encounters: 
 18 68.7 kg (151 lb 7.3 oz) 
 18 65.8 kg (145 lb 1 oz) 
 18 65.9 kg (145 lb 4.5 oz) 
 
 Temp Readings from Last 3 Encounters: 
 19 98.7 F (37.1 C) (Oral) 
 18 98.2 F (36.8 C) (Oral) 
 18 97.9 F (36.6 C) (Oral) 
 
 BP Readings from Last 3 Encounters: 
 19 127/61 
 18 139/63 
 18 111/61 
 
 Pulse Readings from Last 3 Encounters: 
 19 80 
 
 18 74 
 18 85 
 
 Head:  Normocephalic atraumatic
 
 HEENT: Pink palpebral conjunctivae. Anicteric sclerae. No
 tonsillopharyngeal congestion.
 
 Neck : Noted no  Cervical Lymphadenopathy
 
 CHEST:Clear Breath Sounds Bilateral . 
 
 HEART: Regular rate and rhythm. 2/6  murmurs no thrills or rubs
 
 ABDOMEN: Soft, flat. No tenderness. No hepatosplenomegaly.
 
 GROIN AREA: Negative  for intertrigo.
 
 EXTREMITIES: upper extremity no lesions.
 Right lower extremities no edema. Left foot with dressing.
  
 
 NEUROLOGIC: No localizing signs.
 
 Skin : NO rash or ecchymosis.
 
 EEG results
 On 18
  
 This wake EEG is abnormal. Diffuse slowing is 
 suggestive of a diffuse encephalopathy of metabolic, degenerative 
 or vascular origin. No epileptiform activity was noted with body 
 jerking. 
 The absence of epileptiform discharge during the EEG recording 
 does not rule out the diagnosis of a seizure disorder. Clinical 
 correlation is recommended. 
 LABORATORY DATA
 
 Lab Results 
 Component Value Date 
  WBC 5.21 2019 
  HGB 8.7 (L) 2019 
  HCT 26.3 (L) 2019 
   2019 
  CHOL 101 2017 
  TRIG 132 2017 
  HDL 34 (L) 2017 
  ALT 17 2019 
  AST 23 2019 
   2019 
  K 4.5 2019 
  CL 99 2019 
  CREATININE 3.5 (H) 2019 
  BUN 13 2019 
  CO2 31 2019 
  TSH 1.14 2017 
  INR 1.0 2018 
  GLUF 103 (H) 2019 
  HGBA1C 7.5 (H) 2018 
 
 
 Hepatic Function Panel:  
 Lab Results 
 Component Value Date 
  PROT 6.3 2019 
  ALB 2.6 (L) 2019 
  BILITOT 0.4 2019 
  BILIDIR 0.1 2017 
  ALP 85 2019 
  AST 23 2019 
  ALT 17 2019 
  
 
 Lipase: 
 Lab Results 
 Component Value Date 
  LIPASE 332 2017 
  
 U/A:  
 Lab Results 
 Component Value Date 
  COLORU YELLOW 2011 
  CLARITYU Slightly Cloudy 10/16/2018 
  MEROPENEM 1.009 2013 
  LEUKOCYTESUR  10/16/2018 
    Comment: 
    small 
  NITRITE Negative 10/16/2018 
  UROBILINOGEN Normal 10/16/2018 
  UPRO  10/16/2018 
    Comment: 
    100 
  UPRO 100 2013 
  PHUR 8 10/16/2018 
  BLOODU Negative 10/16/2018 
  KETONES negative 10/16/2018 
  BILIRUBINUR Negative 10/16/2018 
  GLUCOSEU  10/16/2018 
    Comment: 
    moderate 
  
 DIAGNOSTIC IMAGING
 
 CAT scan 
 
 CT head without contrast [[id number]] Collected: 18 
 Order Status: Completed Updated: 18 1733 
 Narrative:  
 CHERIE VILLALOBOS
 1950
 68 years Female
 CT HEAD WO CONTRAST
 2018 5:23 PM
 
 INDICATION: Altered mental status in a patient with acute osteomyelitis
 
 COMPARISON: Head CT, 2017
 
 TECHNIQUE: CT scan of the head without contrast. 5-mm axial noncontrast images were acqui
red from the foramen magnum through the cranial vertex. Multiplanar reformations were obtain
ed from the acquisition data.
 
 
 At least one of the following CT dose optimization techniques were used: Automated exposure
 control; Adjustment of mA and/or kV according to patient size; Use of iterative reconstruct
ion technique.
 
 FINDINGS:
 
 Brain: No intracranial hemorrhage, midline shift or pathologic mass effect. No cerebral kt
ma, mass lesion or evidence of acute infarct.
 
 Ventricles and extra-axial fluid spaces: Normal.
 
 Paranasal sinuses and mastoid air cells: Normal.
 
 Calvarium and extracranial soft tissues: Normal.
 
 Orbits: The patient is status post bilateral cataract surgery. 
 Impression:  
 1. No acute intracranial pathology. 
 
 Xr Chest 2 View
 
 Result Date: 2018
 1.  Mild diffuse pulmonary edema, similar to the prior exam. 2.  Left perihilar airspace op
acity which may represent edema. Recommend repeat chest x-ray in 6-8 weeks to verify resolut
ion. 3.  Small left pleural effusion and/or pleural scarring, with adjacent atelectasis or c
onsolidation, similar to the prior exam. Small right pleural effusion. 4.  Peripheral opacit
y within the left lower hemithorax, slightly increased which is questionable for a loculated
 pleural fluid, parenchymal opacity, or artifact. Electronically signed by Chau Garcia on 1
2018 5:33 PM
 
 X-ray Foot Left
 
 Result Date: 2018
 Amputation of the left forefoot at the level of the proximal metatarsals. Surgical cutaneou
s staples are present. No radiographic evidence for osteomyelitis. Normal alignment of the h
indfoot. Mild degeneration of the midfoot. Electronically signed by Oleksandr Lemus MD on 2018 10:12 AM
 
 Ct Head Without Contrast
 
 Result Date: 2018
 1.  No acute intracranial pathology. Electronically signed by Steven Samano MD on  5:28 PM
 
 Nm Myocardial Perfusion Spect (stress And Rest)
 
 Result Date: 2018
 1.  Mild to moderate left ventricular dilatation, unchanged between rest and stress. 2.  I 
cannot exclude mid anterior wall infarct. 3.  I see no evidence of ischemia. 4.  Global mode
rate hypokinesis. 5.  LVEF 29% stress, 33% rest. Using the risk stratification system at our
 institution, this examination equates to at least a high risk based on this imaging, arisin
g from the criteria below (1). Note that this should be interfaced with clinical and other d
patrizia, potentially affecting final categorization. American Heart Association and American Col
lege of Cardiology Scientific Statement. Circulation (2008); 118: p 3817-7029 Electronically
 signed by Wilfrido Knight MD on 2018 4:34 PM
 
 Ir Angiogram Extremity Left
 
 Result Date: 2018
 
 1. Aorta with narrow aortic bifurcation with moderate stenosis of proximal left common errol
c artery. 2. Left SFA with moderate stenosis proximally. 3. Single vessel runoff to left jeanne
t via left anterior tibial artery. 4. Left posterior tibial artery and peroneal artery were 
occluded with reconstitution of distal posterior tibial artery at the left ankle via collate
rals. 5. If forefoot amputation does not heal, may need popliteal to posterior tibial artery
 bypass versus antegrade access of left common femoral artery with posterior tibial artery r
ecanalization. Electronically signed by Kirt Mclaughlin MD on 2018 10:51 AM
 
 Cherie Villalobos is a 68 y.o. female with the following Problems 
 Patient Active Problem List 
 Diagnosis 
   Proteinuria 
   Vitamin D deficiency 
   Secondary hyperparathyroidism (HCC) 
   Recurrent UTI 
   Coronary artery disease involving native coronary artery of native heart without angina
 pectoris 
   Depression 
   ESRD (end stage renal disease) (HCC) 
   Type 2 diabetes mellitus with chronic kidney disease on chronic dialysis, with long-ter
m current use of insulin (HCC) 
   Anemia in ESRD (end-stage renal disease) (HCC) 
   Hypertension, essential 
   Dyslipidemia 
   Gastroesophageal reflux disease without esophagitis 
   Diabetic foot ulcer (HCC) 
   Loss of weight 
   Dysphagia, unspecified 
   Foot ulcer due to DM  (HCC) 
   Severe protein-calorie malnutrition (HCC) 
   Osteomyelitis of left foot (HCC) 
   Pleural effusion 
   Prolonged Q-T interval on ECG 
   Chronic systolic congestive heart failure (HCC) 
   Chronic diarrhea 
   Chronic anticoagulation 
   Plantar ulcer (HCC) 
   Cardiomyopathy (HCC) 
   COPD (chronic obstructive pulmonary disease) (HCC) 
   ICD (implantable cardioverter-defibrillator) in place 
   Implantable defibrillator reprogramming/check 
   Mixed hyperlipidemia 
   Moderate protein-calorie malnutrition (HCC) 
   Paroxysmal atrial fibrillation (HCC) 
   S/P CABG x 2 
   S/P mitral valve repair 
   Steroid-induced hyperglycemia 
   Stress hyperglycemia 
   Chronic multifocal osteomyelitis of left foot (HCC) 
   PVD (peripheral vascular disease) (HCC) 
   CAD (coronary artery disease) 
 
 Plan:
 
 CBC CMP ESR CRP  Vancomycin trough 
 q Monday 
 Vancomycin 500 mg iv and ceftazidime 2 gm after each hemodialysis
 Follow up in 2 weeks. 
 RECOMMENDATIONS
 continue with t discharge planning for iv vancomycin and ceftazidime for 6 weeks until   
 Discussed with Dr. Vincent hospitalist  who agreed and will proceed with GI consult and cardio
logy follow up. 
  
 As plan.
 Thank you very much for the consult.
 ______________________
 
 ______________________
 MD Melisa FRANKLIN, Winston, DO - 2019  2:59 PM PSTFormatting of this note may be different from the 
original.
 [facility]
 Service:  Hospitalist
 Progress Note
 
 Pt: Cherie Villalobos  AGE/SEX: 68 y.o.   female      : 1950      
 MRN: [id number]          ROOM: [address] [place] [address]   
 TODAY'S DATE: 1/3/2019
 
 Hospital Day:   LOS: 6 days 
 SUBJECTIVE
 Patient evaluated at bedside. Patient denies significant foot pain in the left foot. Patien
t continues to have episodes of epigastric and left arm pain that resolves with nitroglyceri
n. Cardiology following, recommending GI evaluation. No new complaints otherwise. 
 
 Scheduled Medications 
   apixaban  2.5 mg Oral BID 
   aspirin  81 mg Oral Daily with breakfast 
   atorvastatin  40 mg Oral Nightly 
   calcium acetate  667 mg Oral TID WC 
   carvedilol  3.125 mg Oral BID WC 
   cefTAZidime  2 g Intravenous After Hemodialysis (see admin instructions) 
   clopidogrel  75 mg Oral Daily 
   furosemide  20 mg Oral Daily 
   insulin glargine  20 Units Subcutaneous Nightly 
   insulin lispro (human)  0-10 Units Subcutaneous TID AC 
   insulin lispro (human)  0-5 Units Subcutaneous Nightly 
   isosorbide mononitrate  60 mg Oral Daily 
   losartan  100 mg Oral Daily 
   nortriptyline  25 mg Oral Daily 
   nortriptyline  50 mg Oral Nightly 
   oxybutynin  2.5 mg Oral BID 
   pantoprazole  40 mg Oral QAM AC 
   rOPINIRole  0.75 mg Oral Nightly 
   vancomycin  750 mg Intravenous See Admin Instructions 
 
 Continuous Infusions 
   dextrose   
 
 PRN Medications
 acetaminophen **OR** acetaminophen, albumin human, albuterol, dextrose, dextrose, dextrose,
 glucagon, glucagon, HYDROcodone-acetaminophen **OR** HYDROcodone-acetaminophen, loperamide,
 LORazepam, nitroGLYCERIN, ondansetron **OR** ondansetron, polyethylene glycol, prochlorpera
zine, saline lock IV - when tolerating PO fluids **AND** sodium chloride (PF)
 
 Allergy:
 Allergies 
 Allergen Reactions 
 
   Quinapril Hcl Anaphylaxis 
   Digoxin And Related Other (See Comments) 
   toxic 
   Metaxalone Other (See Comments) 
   Morphine Mental Changes 
   Make her crazy/ "funny feeling in my head"
 Has used hydromorphone in-house 
   Pantoprazole Sodium Nausea and Vomiting 
   Penicillins Rash 
   Quinapril Hcl Edema 
   Facial Edema 
   Sudafed [Pseudoephedrine Hcl] Rash 
 
 OBJECTIVE
 Vitals:
 Patient Vitals for the past 24 hrs:
  BP Temp Temp src Pulse Resp SpO2 
 19 1440 118/56 98.6 F (37 C) Oral 70 16 91 % 
 19 1057 133/63 98.2 F (36.8 C) Oral 74 16 97 % 
 19 0724 124/61 98.1 F (36.7 C) Oral 76 18 90 % 
 19 0314 107/63 97.9 F (36.6 C) Oral 66 16 98 % 
 19 2335 103/53 - - - - - 
 19 2300 99/49 98.1 F (36.7 C) Oral 72 16 99 % 
 19 1947 108/53 - - - - - 
 19 1942 96/46 97.9 F (36.6 C) Oral 67 16 - 
 19 1830 (!) 144/93 - - 82 - (!) 84 % 
 19 1815 121/58 - - 80 - 99 % 
 19 1758 115/56 - - 74 - 99 % 
 19 1739 124/57 - - 80 16 99 % 
 19 1734 130/72 - - 86 16 100 % 
 19 1537 129/60 97.9 F (36.6 C) - 84 16 - 
 19 1530 - - - - 16 - 
 19 1515 130/61 - - 74 16 - 
 19 1500 128/59 - - 70 16 - 
 
 I&O Detailed Table: 
 
 Intake/Output Summary (Last 24 hours) at 19 1459
 Last data filed at 19 1420
  Gross per 24 hour 
 Intake             3411 ml 
 Output             1700 ml 
 Net             1711 ml 
 
 No data found.
 
 Hemodynamics Last 24hrs:   
 
 Examination:
 
 Constitutional: Oriented to person, place, and time. appears well-developed and well-nouris
hed. 
 
 HEENT: 
 Head: Normocephalic and atraumatic. 
 Nose: Nose normal. 
 Mouth/Throat: Oropharynx is clear and moist.
 Eyes: Conjunctivae and EOM are normal. Pupils are equal, round, and reactive to light. Righ
t eye exhibits no discharge. Left eye exhibits no discharge. No scleral icterus. 
 Neck: Normal range of motion. Neck supple. No JVD present. No tracheal deviation present. N
 
o thyromegaly present. no cervical adenopathy.
 
 Cardiovascular: Normal rate, regular rhythm, normal heart sounds with S1 and S2, and intact
 distal pulses.  Exam reveals no gallop and no friction rub.  No murmur heard.
 
 Pulmonary/Chest: Effort normal and breath sounds normal. No stridor. No respiratory distres
s.  no wheezes. no rales. exhibits no tenderness. 
 
 Abdominal: Soft. Bowel sounds are normal. exhibits no distension and no mass. There is no t
enderness. There is no rebound and no guarding. 
 
 Extremities/Musculoskeletal: Normal range of motion.exhibits no tenderness.  exhibits no ed
ema. Left leg bandage in place. 
   
 Neurological:  Alert and oriented to person, place, and time. Has normal reflexes.  display
s normal reflexes. No cranial nerve deficit.  Exhibits normal muscle tone. Coordination norm
al. Exhibits twitching 
 
 Skin: Skin is warm and dry. No rash noted. No erythema. No pallor. 
 
 Psychiatric: Has a normal mood and affect. Behavior is normal. Judgment normal. 
 
 LABS:
 WBC 
 Date Value Ref Range Status 
 2019 5.22 3.80 - 11.00 K/uL Final 
 
 RBC 
 Date Value Ref Range Status 
 2019 2.48 (L) 3.70 - 5.10 M/uL Final 
 
 HGB 
 Date Value Ref Range Status 
 2019 8.4 (L) 11.3 - 15.5 g/dL Final 
 
 HCT 
 Date Value Ref Range Status 
 2019 25.7 (L) 34.0 - 46.0 % Final 
 
 MCV 
 Date Value Ref Range Status 
 2019 103.9 (H) 80.0 - 100.0 fl Final 
 
 MCH 
 Date Value Ref Range Status 
 2019 33.8 27.0 - 34.0 pg Final 
 
 MCHC 
 Date Value Ref Range Status 
 2019 32.5 32.0 - 35.5 g/dL Final 
 
 RDW SD 
 Date Value Ref Range Status 
 2019 59.5 (H) 37 - 53 fl Final 
 
 PLT 
 Date Value Ref Range Status 
 2019 149 (L) 150 - 400 K/uL Final 
 
 MPV 
 
 Date Value Ref Range Status 
 2019 9.0 fl Final 
 
 NEUTROPHILS ABS 
 Date Value Ref Range Status 
 2019 3.78 1.90 - 7.40 K/uL Final 
 
 LYMPHOCYTES ABS 
 Date Value Ref Range Status 
 2019 0.94 (L) 1.00 - 3.90 K/uL Final 
 
 EOSINOPHILS ABS 
 Date Value Ref Range Status 
 2019 0.00 0.00 - 0.50 K/uL Final 
 
 BASOPHILS ABS 
 Date Value Ref Range Status 
 2019 0.03 0.00 - 0.10 K/uL Final 
   Comment: 
   Testing performed at [facility], [address] [place]  [address] 
 
 Neutrophils Manual 
 Date Value Ref Range Status 
 2019 86 % Final 
 
 Lymphocytes Manual 
 Date Value Ref Range Status 
 2019 10 % Final 
 
 Monocytes Manual 
 Date Value Ref Range Status 
 2019 4 % Final 
 
 MORPHOLOGY 
 Date Value Ref Range Status 
 2019 1+  Final 
   Comment: 
   ANISO
 1+
 MACRO
 NORMAL PLT MORPH
 Testing performed at [facility], [address] [place] [address]  [id number]
  
 
 GLUCOSE 
 Date Value Ref Range Status 
 2019 86 65 - 99 mg/dL Final 
 
 BUN 
 Date Value Ref Range Status 
 2019 21 8 - 25 mg/dL Final 
 
 CREATININE 
 Date Value Ref Range Status 
 2019 4.5 (H) 0.50 - 1.00 mg/dL Final 
 
 BUN/CREAT 
 Date Value Ref Range Status 
 2019 5  Final 
 
 
 TOTAL PROTEIN 
 Date Value Ref Range Status 
 2019 5.9 (L) 6.3 - 8.2 g/dL Final 
 
 GLOBULIN 
 Date Value Ref Range Status 
 2019 3.5 1.3 - 4.9 g/dL Final 
 
 TBIL 
 Date Value Ref Range Status 
 2019 0.3 0.1 - 1.5 mg/dL Final 
 
 ALT 
 Date Value Ref Range Status 
 2019 16 10 - 65 U/L Final 
 
 AST 
 Date Value Ref Range Status 
 2019 21 10 - 45 U/L Final 
 
 SODIUM 
 Date Value Ref Range Status 
 2019 138 135 - 145 mmol/L Final 
 
 POTASSIUM 
 Date Value Ref Range Status 
 2019 4.5 3.5 - 4.9 mmol/L Final 
 
 CHLORIDE 
 Date Value Ref Range Status 
 2019 100 99 - 109 mmol/L Final 
 
 CO2 
 Date Value Ref Range Status 
 2019 29 23 - 32 mmol/L Final 
 
 ANION GAP AGAP 
 Date Value Ref Range Status 
 2019 14 5 - 20 mmol/L Final 
 
 [unfilled]
 HDL CHOL 
 Date Value Ref Range Status 
 2017 34 (L) >40 mg/dL Final 
 
 CHOLESTEROL 
 Date Value Ref Range Status 
 2017 101 <200 mg/dL Final 
 
 TSH 
 Date Value Ref Range Status 
 2017 1.14 0.45 - 5.10 uIU/mL Final 
   Comment: 
   Testing performed at [facility];[address];[place] [address] 
 
 TOTAL PROTEIN 
 Date Value Ref Range Status 
 2019 5.9 (L) 6.3 - 8.2 g/dL Final 
 
 TBIL 
 
 Date Value Ref Range Status 
 2019 0.3 0.1 - 1.5 mg/dL Final 
 
 BILI, DIRECT 
 Date Value Ref Range Status 
 2017 0.1 0.0 - 0.3 mg/dL Final 
 
 AST 
 Date Value Ref Range Status 
 2019 21 10 - 45 U/L Final 
 
 ALT 
 Date Value Ref Range Status 
 2019 16 10 - 65 U/L Final 
 
 Diagnostic:
 Xr Chest 2 View
 
 Result Date: 2018
 CHERIE CHIU DEYSI 1950 68 years Female XR CHEST 2 VIEW FRONTAL AND LATERAL 2018
 5:27 PM INDICATION: Chest pain COMPARISON: 2018, CT chest 2017 TECHNIQUE: Two vie
w chest, PA and lateral views FINDINGS: The heart is stable in size. Patient is post median 
sternotomy. Right cardiac ICD in situ. Mild diffuse coarsening of lung markings. Suspect mil
d underlying interstitial edema. 1.6 cm perihilar opacity noted on the left. Blunting of the
 left costophrenic angle which may be secondary to effusion and/or scarring. Small right ple
ural effusion. Peripheral opacity along the lower lateral hemithorax, slightly increased que
stionable for artifact or loculated pleural fluid. Atelectasis or consolidation within the l
eft lung base, similar to the prior exam. 
 
 1.  Mild diffuse pulmonary edema, similar to the prior exam. 2.  Left perihilar airspace op
acity which may represent edema. Recommend repeat chest x-ray in 6-8 weeks to verify resolut
ion. 3.  Small left pleural effusion and/or pleural scarring, with adjacent atelectasis or c
onsolidation, similar to the prior exam. Small right pleural effusion. 4.  Peripheral opacit
y within the left lower hemithorax, slightly increased which is questionable for a loculated
 pleural fluid, parenchymal opacity, or artifact. Electronically signed by Chau Garcia on 1
2018 5:33 PM
 
 X-ray Foot Left
 
 Result Date: 2018
 CHERIE VILLALOBOS 1950 XR FOOT LEFT 2018 9:54 AM INDICATION: Osteomyelitis of 
the left foot. COMPARISON: 2018 TECHNIQUE: Left foot series, 3 views, PA, obliq
ue and lateral views 
 
 Amputation of the left forefoot at the level of the proximal metatarsals. Surgical cutaneou
s staples are present. No radiographic evidence for osteomyelitis. Normal alignment of the h
indfoot. Mild degeneration of the midfoot. Electronically signed by Oleksandr Lemus MD on 2018 10:12 AM
 
 Nm Myocardial Perfusion Spect (stress And Rest)
 
 Result Date: 2018
 HISTORY: Chest pain. Peripheral vascular disease. Multiple coronary stents. Multiple previo
us myocardial infarctions. CABG. TECHNIQUE: The patient was intravenously injected with 10.5
 millicuries technetium 99m sestamibi. Rest gated supine SPECT images were obtained.  The pa
tient was then intravenously injected with 0.4 mg Regadenason per protocol.    The patient w
as finally intravenously injected with 30.1 mCi technetium 99m sestamibi, and stress gated s
upine SPECT, and prone SPECT scintigraphic images were obtained. COMPARISON: Echocardiogram 
18. Patient was injected on 17 for a rest SPECT study, but no images were perfor
med. Coronary arteriography 18. FINDINGS: Moderate left ventricular dilatation, unchan
 
ged between rest and stress. Thinning of the proximal inferior wall radiating minimally into
 the lateral wall. This is unchanged between rest and stress supine images. Stress prone lanre
ges show resolution of this finding, favoring artifact. There is mild thinning of the mid an
terior wall slightly radiating into the septum, unchanged between rest and stress. I cannot 
exclude infarct. LVEF measures 29% stress, 33% rest. Moderate global hypokinesis. Rest EDV 1
70 mL. Rest  mL. Stress  mL. Stress  mL. Transient ischemic dilatation 
ratio 1.06, normal. 
 
 1.  Mild to moderate left ventricular dilatation, unchanged between rest and stress. 2.  I 
cannot exclude mid anterior wall infarct. 3.  I see no evidence of ischemia. 4.  Global mode
rate hypokinesis. 5.  LVEF 29% stress, 33% rest. Using the risk stratification system at our
 institution, this examination equates to at least a high risk based on this imaging, saraywilber cm from the criteria below (1). Note that this should be interfaced with clinical and other d
patrizia, potentially affecting final categorization. American Heart Association and American Col
lege of Cardiology Scientific Statement. Circulation (2008); 118: p 6664-5263 Electronically
 signed by Wilfrido Knight MD on 2018 4:34 PM
 
 Ir Angiogram Extremity Left
 
 Result Date: 2018
 LOWER EXTREMITY ARTERIOGRAM, UNILATERAL PREOPERATIVE DIAGNOSIS:  Critical limb ischemia and
 need for left forefoot amputation POSTOPERATIVE DIAGNOSIS:  Same PROCEDURE: 1. Moderate con
scious sedation 2. Ultrasound guided access of the right common femoral artery 3. Aortogram 
4. Selective left common femoral artery catheterization with left leg runoff 5. Left common 
iliac artery angioplasty using 6 x 40 mm angioplasty balloon 6. Drug eluting balloon angiopl
asty of left SFA using 4 x 100 mm Lutonix balloon SURGEON: Kirt Mclaughlin MD ASSISTANT: Arianna ANE
HESIA: Moderate sedation and local anesthesia Informed consent was obtained from the patient
. Continuous cardiac monitoring was performed throughout the procedure. Conscious sedation w
as provided by the nursing staff during the procedure under my supervision. Sedation time: 5
6 minutes Medications: 1 mg Versed IV, 100 mcg Fentanyl IV ESTIMATED BLOOD LOSS: Minimal CON
TRAST:  55 mL of Isovue 250 INDICATIONS:  See preoperative history and physical FINDINGS:  A
jabier with narrow aortic bifurcation. Moderate stenosis of proximal left common iliac artery 
making passing sheath difficult. Left SFA with moderate stenosis proximally. Single vessel r
unoff seen via left anterior tibial artery to foot. The posterior tibial artery does reconst
itute in left ankle via collaterals. Unable to pass sheath over aortic bifurcation due to il
iac disease and small arteries. After angioplasty, moderate angiographic results. Should be 
able to heal left forefoot amputation. If amputation does not heal, may need popliteal to po
sterior tibial artery bypass versus antegrade access of left common femoral artery with post
erior tibial artery recanalization. DESCRIPTION OF PROCEDURE:  The patient was properly iden
tified and brought to the Cath Lab. The patient was placed supine on the catheter table and 
patient was given moderate sedation by nursing staff under my supervision. Patient's bilater
al groins were then prepped and draped in the usual sterile fashion. Local anesthetic was gi
fidelia to the right groin and the right common femoral artery was accessed under ultrasound oksana
dance using a micropuncture needle. A micropuncture sheath was inserted over a wire and up s
ized to a 4 French sheath. A Omni flush catheter was then inserted into the distal aorta and
 aortogram was then performed with the findings as described above. The Omni Flush catheter 
was then used to guide a Glidewire into the left common femoral artery and then the catheter
 was then advanced over the wire. A left lower extremity runoff was then performed with the 
findings as described above. Next, an Amplatzer wire was then inserted over the catheter and
 the 4 French sheath was removed. A 6 French destination sheath was then inserted over the w
antione with the tip of the sheath being placed into the proximal left common iliac artery. The 
sheath would not pass through the diseased left common iliac artery. Angioplasty of the left
 common iliac artery was then performed using 6 x 40 mm angioplasty balloon. However, the sh
eath with still not pass after angioplasty. A vertebral catheter was inserted over the wire 
and the wire was then removed. A Glidewire was then placed into the vertebral catheter and u
sed to cross through the stenotic left superficial femoral artery.  Next, a 260 fix core wir
e was then inserted over the catheter.  Drug eluting balloon angioplasty of the left SFA was
 then performed using 4 x 100 mm Lutonix balloon. Unable to cross left posterior tibial britt
ry occlusion due to tip is sheath only being in left common iliac artery. A final arteriogra
m was then performed through the sheath. The destination sheath was then removed and a Mynx 
 
closure device was then used on the right common femoral artery and hemostasis was ensured. 
The patient was then taken to the observation unit for further monitoring. 
 
 1. Aorta with narrow aortic bifurcation with moderate stenosis of proximal left common errol
c artery. 2. Left SFA with moderate stenosis proximally. 3. Single vessel runoff to left jeanne
t via left anterior tibial artery. 4. Left posterior tibial artery and peroneal artery were 
occluded with reconstitution of distal posterior tibial artery at the left ankle via collate
rals. 5. If forefoot amputation does not heal, may need popliteal to posterior tibial artery
 bypass versus antegrade access of left common femoral artery with posterior tibial artery r
ecanalization. Electronically signed by Kirt Mclaughlin MD on 2018 10:51 AM
 
 Echo Cardiac Adult With Contrast
 
 Result Date: 2018
 Patient Name:  CHERIE VILLALOBOS YOB: 1950 Accession:  [id number] Performing Phys
ician:   Duglas Ornelas MD _______________________________________________________________ IND
ICATIONS ----------- SHORTNESS OF BREATH, DEFINITY WAS USED/SIGNIFICANT PATIENT REACTION FLA
NK PAIN CONCLUSIONS ----------- 1. Overall left ventricular systolic function is severely im
paired with, an EF between 20 - 25 %. 2. There is mild aortic regurgitation. 3. There is mil
d to  moderate aortic stenosis present. 4. Mild-to-moderate mitral regurgitation is present.
 5. There is moderate pulmonary hypertension. FINDINGS -------- Study: A 2-dimensional trans
thoracic echocardiogram with m-mode, spectral and color flow Doppler was perfomed. Study: Th
is was a technically adequate study. Left Ventricle: Overall left ventricular systolic funct
ion is severely impaired with, an EF between 20 - 25 %. Left Ventricle: The left ventricle c
avity size is normal. Left Ventricle: Left ventricular wall thickness is normal. Right Ventr
icle: The right ventricle is mildly enlarged measuring between 3.4 - 3.7 cm. Right Ventricle
: The right ventricular systolic function is at the low end of normal. Right Ventricle: Pace
r/ICD wire seen. Left Atrium: The left atrial size is normal Left Atrium: , and the LA measu
res 4.6cm. Right Atrium: The right atrium is normal in size. Right Atrium: Pacemaker wire se
en in the right atrial cavity. Aortic Valve: The aortic valve is trileaflet. Aortic Valve: T
he aortic valve is mildly calcified. Aortic Valve: There is mild aortic regurgitation. Aorti
c Valve: There is moderate aortic stenosis present. Mitral Valve: Mild-to-moderate mitral re
gurgitation is present. Mitral Valve: Mild mitral annular calcification present. Tricuspid V
alve: The tricuspid valve appears structurally normal. Tricuspid Valve: Mild tricuspid regur
gitation present. Tricuspid Valve: There is moderate pulmonary hypertension. Tricuspid Valve
: The right ventricular systolic pressure (pulmonary artery systolic pressure), as measured 
by Doppler, is 55.99mmHg. Pulmonic Valve: The pulmonic valve was not well visualized. Pulmon
ic Valve: Mild pulmonic regurgitation. Pericardium: There is no pericardial effusion. IVC/He
patic Veins: The IVC is normal size (1.5-2.5cm) and collapses >50% with sniff, consistent wi
th central venous pressures of 5-10mmHg. Contrast: Poor visualization. Definity was used to 
opacify the left ventricular chamber and improve delineation of the endocardial border. DIANE
UREMENTS ------------- CHEIKH Planimetry:   2.21 cm2 AVAI Planimetry:   0.00 cm2/m2 RA Area:   
13.84 cm2 IVC:   1.61 cm LA Major:   5.27 cm EDV(Teich):   121.12 ml IVSd:   0.82 cm LVIDd: 
  5.05 cm LVPWd:   0.88 cm LVOT Diam:   1.87 cm %FS:   9.90 % EF(Teich):   21.60 % ESV(Teich
):   94.95 ml IVSs:   0.96 cm LVIDs:   4.55 cm LVPWs:   1.05 cm SV(Teich):   26.17 ml RA Adan
or:   4.85 cm RVIDd:   3.62 cm LVEF MOD A2C:   23.68 % SV MOD A2C:   32.38 ml LVEF MOD A4C: 
  21.82 % SV MOD A4C:   27.33 ml EF Biplane:   22.47 % LVEDV MOD BP:   131.72 ml LVESV MOD B
P:   102.11 ml LVEDV MOD A2C:   136.73 ml LVLd A2C:   8.73 cm LVEDV MOD A4C:   125.25 ml LVL
d A4C:   8.87 cm LVESV MOD A2C:   104.34 ml LVLs A2C:   8.34 cm LVESV MOD A4C:   97.91 ml LV
Ls A4C:   8.60 cm LAESV(A-L):   48.15 ml LAESV Index (A-L):   26.17 ml/m2 LAAs A2C:   16.24 
cm2 LAESV A-L A2C:   47.34 ml LALs A2C:   4.73 cm LAAs A4C:   16.52 cm2 LAESV A-L A4C:   47.
99 ml LALs A4C:   4.83 cm Ao Diam:   2.59 cm AV Cusp:   1.65 cm LA Diam:   4.60 cm LA/Ao:   
1.77 TAPSE:   1.57 cm IVC diameter:   1.61 cm IVC collapse:   0.68 cm IVC % collapse:   56.1
6 % AR Dec Clermont:   3.95 m/s2 AR Dec Time:   955.07 ms AR maxP.06 mmHg AR PHT:   276.
97 ms AR Vmax:   3.77 m/s HR:   68.91 BPM AV maxP.56 mmHg AV meanPG:   10.23 mmHg AV 
Vmax:   2.21 m/s AV Vmean:   1.53 m/s AV VTI:   47.77 cm CHEIKH Vmax:   1.03 cm2 CHEIKH (VTI):   0
.99 cm2 AVAI Vmax:   0.00 cm2/m2 AVAI (VTI):   0.00 cm2/m2 LVCI Dopp:   1.80 l/minm2 LVCO Do
pp:   3.32 l/min HR:   70.30 BPM LVOT maxP.71 mmHg LVOT meanP.55 mmHg LVSI Dopp:
   25.70 ml/m2 LVSV Dopp:   47.30 ml LVOT Vmax:   0.82 m/s LVOT Vmean:   0.58 m/s LVOT VTI: 
  17.07 cm MCO:   459.56 ms MV A Hiram:   0.83 m/s MV DecT:   232.85 ms MV E Hiram:   1.70 m/s M
 
V E/A Ratio:   2.05 MV PHT:   80.47 ms MVA By PHT:   2.73 cm2 MV A Dur:   82.77 ms MV maxP.14 mmHg MV meanP.79 mmHg MV Vmax:   1.74 m/s MV Vmean:   1.01 m/s MV VTI:   45.3
3 cm MVA (VTI):   1.04 cm2 Septal e':   0.04 m/s Septal E/e':   38.01 Lateral e':   0.08 m/s
 Lateral E/e':   21.28 P Vein D:   0.84 m/s P Vein S/D Ratio:   0.56 P Vein S:   0.47 m/s PA
EDP:   8.38 mmHg PRend P.38 mmHg PRend Vmax:   1.16 m/s HR:   69.37 BPM PV maxP.
87 mmHg PV meanP.11 mmHg PV Vmax:   0.68 m/s PV Vmean:   0.50 m/s PV VTI:   15.15 cm R
AP:   3 mmHg RV S':   0.08 m/s RVSP:   55.99 mmHg TR maxP.99 mmHg TR Vmax:   3.63 m/s
 TV A Hiram:   0.44 m/s TV Dec Clermont:   6.11 m/s2 TV Dec Time:   133.74 ms TV E Hiram:   0.81 m/
s TV E/A Ratio:   1.81 Sonographer: CEE Authenticated by: Duglas Ornelas MD Report Date/Time: 
2018 17:39:35 
 
 1. Overall left ventricular systolic function is severely impaired with, an EF between 20 -
 25 %. 2. There is mild aortic regurgitation. 3. There is mild to  moderate aortic stenosis 
present. 4. Mild-to-moderate mitral regurgitation is present. 5. There is moderate pulmonary
 hypertension.
 
 Ir Guidance Vascular Access Us
 
 Result Date: 2018
 This Point of Care (POC) ultrasound image has been reviewed and interpreted by the physicia
n identified as the performing physician in the associated interpretation and report. 
 
 PROBLEM LIST
 Principal Problem:
   Osteomyelitis of left foot (HCC)
 Active Problems:
   Secondary hyperparathyroidism (HCC)
   Depression
   ESRD (end stage renal disease) (HCC)
   Type 2 diabetes mellitus with chronic kidney disease on chronic dialysis, with long-term 
current use of insulin (HCC)
   Anemia in ESRD (end-stage renal disease) (HCC)
   Hypertension, essential
   Diabetic foot ulcer (HCC)
   Chronic systolic congestive heart failure (HCC)
   Chronic anticoagulation
   Cardiomyopathy (HCC)
   Paroxysmal atrial fibrillation (HCC)
   S/P CABG x 2
   S/P mitral valve repair
   PVD (peripheral vascular disease) (HCC)
   CAD (coronary artery disease)
 
 ASSESSMENT & PLAN
 
 Osteomyelitis of left foot SP left TMA by Dr. Jordan on 18. Bone culture grew normal
 skin marzena. However bone pathology is pending. Patient is receiving cefepime with each HD p
er nephrology and ID recommendations. 
 - Dr. Jordan is following patient in the hospital. 
 - ID consult from Dr. Elliott appreciated. Will follow recommendations regarding use of future
 antibiotic.  
 - Continue vanc and ceftazidime 
 
 Phantom pain.Resolved.
 
 Twitching with AMS - currently resolved/improved
 
 ESRD. On HD every T-T-S. Last HD yesterday. Appreciate help from nephrology services. 
 - He to get dialysis today
 
 
 Type II DM with complications. Most recent blood sugars have been in acceptable range . She
 is on Lantus and SSI. Last glycohemoglobin A1c was 7.5 on 18. Will continue current r
egimen of Lantus and Humalog. 
 
 CAD SP CABG, paroxysmal atrial fibrillation, HFrEF. patient had elevation in troponin assoc
iated with chest pain yesterday. Discussed case with cardiology, they recommended medical ma
nagkaren at this time.
 Head lengthy discussion with patient this morning about severity of her cardiac disease.
 -  Will continue apixaban, carvedilol, aspirin, Plavix, losartan and Imdur. 
 - Sublingual nitroglycerin prn
 
 History of PAD. SP left leg angioplasty. On Plavix and aspirin. 
 
 Anemia of chronic kidney disease. Management per nephrology services. 
 
 Disposition: [facility] eval, will likely go tomorrow, on maximal therapy. 
 
 Winston Vincent DO
 1/3/2019 2:59 PM
 
 Michelle Awad MD - 2019  2:52 PM PSTFormatting of this note may be different from caro rodas original.
 428/428-1  
    LOS: 6 days 
 She was admitted with plan for left TMA with Dr. Jordan which she had done 18.
 She ate today and did indicate some discomfort and food catching in her upper abdomen witho
ut any vomiting. She did indicate nausea. She has no diarrhea/constipation. She has no chest
 pain. She remains non ambulatory.
 
 PMH, PSH, Social history reviewed in chart. Allergies and medications reviewed. Previous hi
story summarized as above. Prior lab data and imaging reviewed.Patient's old records and lab
s were reviewed in detail and summarized.
 
 ROS:
 Review of systems is as per history of present illness. Otherwise a 14 organ system review 
of systems was done and is negative.
 
 Examination:
 
 APPEARANCE: She is is alert, awake, sitting up in bed in no distress. 
 VITALS: Reviewed as listed.
 HEAD: NC/AT. 
 EYES: EOMI. Non-icteric sclera.
 NECK: No JVD. 
 LUNGS: Effort fair. CTA. 
 HEART: S1 soft, no pericardial rub noted. Systolic murmur at apex with radiation to back.
 ABDOMEN: Soft with minimal distention, no tenderness. No organomegaly or bruits noted. Benoit
l sounds diminished.
 EXTREMITIES: No LE edema. No cyanosis or clubbing. Peripheral pulses not palpable. Left jeanne
t s/p TMA with bandage.
 SKIN: Warm to touch. No rash.
 NEUROLOGIC: Alert, awake, oriented, speech fluent. Gait not tested. 
 PSYCH: pleasant demeanor..
 BACK: No CVA tenderness noted. There is no sacral edema.
 
 Dialysis access
 Left brachiocephalic AV fistula with no evidence of malfunction or inflammation
 
 vital Signs:
 /56 (BP Location: Right upper arm)  | Pulse 70  | Temp 98.6 F (37 C) (Oral)  | Re
 
sp 16  | Ht 1.778 m (5' 10")  | Wt 68.7 kg (151 lb 7.3 oz)  | SpO2 91%  | BMI 21.73 kg/m 
 
 Data evaluation:
 
 Lab Results 
 Component Value Date 
  BUN 21 2019 
  CREATININE 4.5 (H) 2019 
  EGFR 10 (L) 2019 
   2019 
  K 4.5 2019 
   2019 
  CO2 29 2019 
  CA 9.2 2019 
  PHOS 4.9 (H) 2018 
  MG 2.2 2018 
  ALB 2.4 (L) 2019 
  HGB 8.4 (L) 2019 
 
 Lab Results 
 Component Value Date 
  HGB 8.4 (L) 2019 
  HGB 8.7 (L) 2019 
  HGB 8.9 (L) 2019 
  HGB 8.9 (L) 2019 
  FERRITIN 722 (H) 2017 
  FERRITIN 793 (H) 2016 
  FERRITIN 883 (H) 2016 
  LABIRON 28 2017 
  LABIRON 17 2016 
  LABIRON 31 2016 
 
 Lab Results 
 Component Value Date 
  ANIONGAP 14 2019 
 
 Last 3 Troponin:  
 Lab Results 
 Component Value Date 
  TROPONINI 0.256 (H) 2019 
  TROPONINI 0.274 (H) 2019 
  TROPONINI 0.318 (H) 2019 
 
 Us Lower Extremity Arterial Left 2018 showed Greater than 50% stenosis of the mid sup
erficial femoral artery. 2.  Monophasic single runoff to the ankle via the anterior tibial a
rtery.
 
 X-ray Foot Left 2018 showed amputation of the left forefoot at the level of the proxi
mal metatarsals. No radiographic evidence for osteomyelitis. Normal alignment of the hindfoo
t. Mild degeneration of the midfoot. 
 
 Assessment and Recommendations:
 
 1. ESRD on HD
 2. Anemia chronic renal failure
 3. Chronic systolic CHF s/p ICD
 4. Chest/upperabdominal pain with concern for angina
 5. Anemia chronic renal failure
 6. Chronic anticoagulation
 7. PAD s/p left TMA
 
 8. Paroxysmal Afib
 9. CAD s/p CABG
 
 She is hemodynamically stable with no fever/tachycardia/bradycardia/hypotension or severe h
ypertension/hypoxia at rest.
 She is euvolemic and CXR from 19 does really show any evidence of hypervolemia although
 she has small pleural effusion.
 BP is OK and she remains in SR on EKG from 18. She remains on BB/ARB and small dose of 
loop diuretic as she is nonoliguric.
 It doesn't look like she had an AMI although troponin was marginally elevated without any s
ignificant new EKG changes. She remains on ASA/clopidogrel/apixaban/atorvastatin/long acting
 nitrate. She has been seen by Dr. Luz whose evaluation is noted.
 Gastritis is a consideration. She remains on PPI.
 Metabolic encephalopathy seems to have improved. I reduced her dose of nortriptyline. She r
emains on oxybutynin.  However dizziness/confusion/lethary are well described SE.
 Left foot osteomyelitis is s/p TMA. She is afebrile. Dr. Elliott is following and managing her
 Abx dose/duration.
 
  We will plan on 2.5 hours of HD today again with no UF/lower BFR and 3 K bath to reduce 
any hemodynamic instability. Continue ASA/clopidogrel/apixaban. There would of course with h
igh risk of bleeding (both over and internal). We are not using any heparin during dialysis.
  ? GI evaluation given upper abdominal discomfort and nausea. 
  FU with vascular surgery/ID/Podiatry regarding discharge needs.
  From my side she can be discharged and resume outpatient HD TTS.
  Continue current ABx for now
 
 She should be on a 2 gm Na, 2 gm K, 1 gm PO4, 1 gm/kg protein diet.
 Please dose all medications for an eGFR of less than 15 ml/min/1.73 m2.
 NSAIDS/HOPPER 2 inhibitors should be avoided. Aluminum/magnesium containing antacids and magne
sium/phosphate containing enemas should be avoided.
 Fluid should be restricted to less than 1.5 L in a 24 hr period.
 Strict I/O should be done.
 Plan of care was discussed with Dr. Vincent.
 
 MICHELLE AWAD MD
 1/3/2019
 
 Portions of my previous notes have been carried over for continuity of care.
 She was seen earlier in the day and charting was completed later after rounds.
 Dictation software, Dragon, was used which may contain error for similar sounding words. Pe
rsonal communication is requested for any clarification.
 Prognosis is guarded in view of multiple comorbid illnesses and ESRD including but not limi
ashly to death.
 
   apixaban  2.5 mg Oral BID 
   aspirin  81 mg Oral Daily with breakfast 
   atorvastatin  40 mg Oral Nightly 
   calcium acetate  667 mg Oral TID WC 
   carvedilol  3.125 mg Oral BID WC 
   cefTAZidime  2 g Intravenous After Hemodialysis (see admin instructions) 
   clopidogrel  75 mg Oral Daily 
   furosemide  20 mg Oral Daily 
   insulin glargine  20 Units Subcutaneous Nightly 
   insulin lispro (human)  0-10 Units Subcutaneous TID AC 
   insulin lispro (human)  0-5 Units Subcutaneous Nightly 
   isosorbide mononitrate  60 mg Oral Daily 
   losartan  100 mg Oral Daily 
   nortriptyline  25 mg Oral Daily 
   nortriptyline  50 mg Oral Nightly 
   oxybutynin  2.5 mg Oral BID 
 
   pantoprazole  40 mg Oral QAM AC 
   rOPINIRole  0.75 mg Oral Nightly 
   vancomycin  750 mg Intravenous See Admin Instructions 
 
   Andrés Flores MD - 2019  9:10 AM PSTFormatting of this note may be different from the 
original.
 
 
 CONSULT NOTE
 1/3/2019
 9:10 AM
 
 PRIMARY PHYSICIAN
 Ivy Couch
 
 CONSULT REQUEST FROM
 Winston Vincent DO
 
 HISTORY OF PRESENT ILLNESS
 The patient is a 68 y.o.-year-old female  with history of ESRD on HD via fistula Tue/Thu/Sa
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
 CABG. Recent LLE angioplasty few weeks ago. She had been on  antibiotics for L foot first d
igit infection with possible osteomyelitis since November (Oral levofloxacin and clindamycin
 then transitioned to IV cefepime with HD until 18, no improvement noted on antibiotics
). She had followed with podiatrist and underwent L TMA on 18, intraoperative findings
 encountered purulence at first metatarsal. 
 Admitted on 18 to [facility]. 
 Wound culture shows skin marzena.
 DAy 7 of iv vancomycin and  Day 3 of ceftazidime
 Had 4 days of cefepime 
 Will complete to iv vancmycin 500 mg iv and ceftazidime 2 gm iv after each hemodialysis uni
tluntil 18 for the infected foot.  
 The patient has problem with tremors on the hands  And on and off confusion. 
 Ct head negative on 18
 EEG done show no seizure just diffuse slowing .
 The patient has some nausea and on and off chest pain stable.
 The patient felt better for possible discharge soon.   
 Past Medical History 
 Diagnosis Date 
   Acute MI (formerly [facility])  
  with stenting x 1 
   Anemia associated with chronic renal failure 2016 
   Atrial fibrillation (formerly [facility])  
   Chronic kidney disease  
   Congestive heart failure (formerly [facility])  
   COPD (chronic obstructive pulmonary disease) (formerly [facility])  
   COPD (chronic obstructive pulmonary disease) (formerly [facility]) 2018 
   Coronary artery disease  
  stent placements: 3 total 
   Depression  
   Diabetes other 
  abstracted 
   Diabetes mellitus, type 2 (formerly [facility])  
   ESRD on peritoneal dialysis (formerly [facility])  
   GERD (gastroesophageal reflux disease)  
   Hemodialysis patient (formerly [facility]) 10/2016 
  Stopped peritoneal and restarted hemodialysis 
   Hemorrhage of gastrointestinal tract, unspecified 2017 
 
  low GI, rectal bleeding 
   High blood pressure other 
  abstracted 
   High cholesterol other 
  abstracted 
   Hyperlipidemia  
   Hypertension  
   Hyponatremia 2017 
   Myocardial infarction (formerly [facility]) 2017 
   Other chronic pain  
  feet,  
   Pain of left hip joint 2016 
  due to hip fracture and replacement 
   Partial small bowel obstruction (formerly [facility]) 2017 
  resolved 
   Renal failure  
  Stage 5.   Fistula in the JAN - not on dialysis yet. 
   Unspecified visual disturbance  
  glasses 
 
 Past Surgical History 
 Procedure Laterality Date 
   AV FISTULA PLACEMENT   
  left upper arm AV fistula - failed 
   AV FISTULA REPAIR N/A 10/25/2016 
  Procedure: AV FISTULA - GRAFT REPAIR/REVISION;  Surgeon: Kirt Mclaughlin MD;  Location: [facility] MA
IN OR;  Service: Vascular;  Laterality: N/A;  Superficialization and revision of left arm fi
stula 
   CARDIAC CATHETERIZATION  2017 
   CARPAL TUNNEL RELEASE Bilateral other 
  abstracted 
   CATARACT EXTRACTION Bilateral 2007 
   CATHETER REMOVAL N/A 2016 
  Procedure: DIALYSIS CATHETER - REMOVAL;  Surgeon: Kirt Mclaughlin MD;  Location: [facility] MAIN OR; 
 Service: Vascular;  Laterality: N/A;  PD cath removal 
   CHOLECYSTECTOMY  other 
  abstracted 
   COLONOSCOPY   
   CORONARY ARTERY BYPASS GRAFT   
   CORONARY STENT PLACEMENT  2017 
  Drug Elutin stents to OM, 1 stent to prox. LAD 
   EYE SURGERY   
   FOOT AMPUTATION Left 2018 
  Procedure: FOREFOOT - AMPUTATION;  Surgeon: Srinivasan Jordan DPM;  Location: [facility] MAIN OR;
  Service: Podiatry;  Laterality: Left; 
   HARDWARE PRESENT   
  stent placements x 3, Left hip replacement, vascular stents 2 in left leg 
   HIP ARTHROPLASTY Left 2016 
  Procedure: HIP - ARTHROPLASTY(ALLISON);  Surgeon: Uriel Roa MD;  Location: [facility] MAIN 
OR;  Service: Orthopedics;  Laterality: Left; 
   HYSTERECTOMY   
  complete 
   PACEMAKER INSERTION   
  [employer] pacemaker/defib 
   PERITONEAL CATHETER INSERTION  8/15/2013 
   PERITONEAL CATHETER INSERTION N/A 2016 
  Procedure: LAPAROSCOPIC - PERITONEAL DIALYSIS CATH INSERTION;  Surgeon: Kirt Mclaughlin MD;  Lo
cation: [facility] MAIN OR;  Service: Vascular;  Laterality: N/A;  Removal of old catheter 
   SHOULDER SURGERY Right  
  frozen shoulder 
 
   UNLISTED PROCEDURE ARTHROSCOPY   
  total Left hip 
   vascular stents Left 2017 
  leg (2 stents) 
   VASCULAR SURGERY   
 
 Current Facility-Administered Medications 
 Medication Dose Route Frequency Provider Last Rate Last Dose 
   acetaminophen (TYLENOL) tablet 650 mg  650 mg Oral Q6H PRN Srinivasan Jordan DPM     
  Or 
   acetaminophen (TYLENOL) suppository 650 mg  650 mg Rectal Q6H PRN Srinivasan Jordan DPM 
    
   albumin human 25 % solution 12.5 g  12.5 g Intravenous Q1H PRN Michelle Awad MD   12.5
 g at 19 9975 
   albuterol (PROVENTIL HFA;VENTOLIN HFA) inhaler  2 puff Inhalation Q4H PRN Srinivasan L Freem
an, DPM     
   apixaban (ELIQUIS) tablet 2.5 mg  2.5 mg Oral BID Srinivasan Jordan DPM   2.5 mg at 01/0
3/19 0831 
   aspirin EC tablet 81 mg  81 mg Oral Daily with breakfast Srinivasan Jordan DPM   81 mg a
t 19 08 
   atorvastatin (LIPITOR) tablet 40 mg  40 mg Oral Nightly Margarita Luz, DO   40 mg at  
   calcium acetate (PHOSLO) capsule 667 mg  667 mg Oral TID  Srinivasan Jordan DPM   667 
mg at 19 08 
   carvedilol (COREG) tablet 3.125 mg  3.125 mg Oral BID  Srinivasan Jordan DPM   3.125 m
g at 19 0837 
   cefTAZidime (FORTAZ) 2 g in sodium chloride (IV) 0.9 % 50 mL IVPB  2 g Intravenous Afte
r Hemodialysis (see admin instructions) Andrés Elliott MD     
   clopidogrel (PLAVIX) tablet 75 mg  75 mg Oral Daily Srinivasan Jordan DPM   75 mg at  0831 
   dextrose 10 % infusion   Intravenous Continuous PRN Srinivasan Jordan DPM     
   dextrose 50 % solution 12 mL  12 mL Intravenous PRN Srinivasan Jordan DPM     
   dextrose 50 % solution 25 mL  25 mL Intravenous PRN Srinivasan Jordan DPM     
   furosemide (LASIX) tablet 20 mg  20 mg Oral Daily GELACIO TerryM   20 mg at  0831 
   glucagon (GLUCAGEN) injection 0.5 mg  0.5 mg Intramuscular PRN Srinivasan Jordan DPM    
 
   glucagon (GLUCAGEN) injection 1 mg  1 mg Intramuscular PRN Srinivasan Jordan DPM     
   HYDROcodone-acetaminophen (NORCO) 5-325 MG per tablet 1 tablet  1 tablet Oral Q4H PRN JESSICA Jordan DPM   1 tablet at 19 1600 
  Or 
   HYDROcodone-acetaminophen (NORCO)  MG per tablet 1 tablet  1 tablet Oral Q4H PRN 
Srinivasan Jordan DPM   1 tablet at 18 1446 
   insulin glargine (LANTUS) injection 20 Units  20 Units Subcutaneous Nightly Augustin Bourne MD   20 Units at 19 2144 
   insulin lispro (human) (HUMALOG) injection 0-10 Units  0-10 Units Subcutaneous TID AC B
axeleloise Jordan DPM   2 Units at 19 1645 
   insulin lispro (human) (HUMALOG) injection 0-5 Units  0-5 Units Subcutaneous Nightly Br
marioeloise Jordan DPM   1 Units at 19 2144 
   isosorbide mononitrate (IMDUR) 24 hr tablet 60 mg  60 mg Oral Daily Srinivasan Jordan DP
M   60 mg at 19 0830 
   loperamide (IMODIUM) capsule 2 mg  2 mg Oral PRN Srinivasan Jordan DPM     
   LORazepam (ATIVAN) tablet 1 mg  1 mg Oral Daily PRN Srinivasan Jordan DPM   1 mg at 01/0
3/19 0055 
   losartan (COZAAR) tablet 100 mg  100 mg Oral Daily Srinivasan Jordan DPM   100 mg at  0831 
   nitroGLYCERIN (NITROSTAT) SL tablet 0.4 mg  0.4 mg Sublingual Q5 Min PRN Srinivasan navas, DPM   0.4 mg at 19 1734 
   nortriptyline (PAMELOR) capsule 25 mg  25 mg Oral Daily Srinivasan Jordan DPM   25 mg at
 19 0830 
 
   nortriptyline (PAMELOR) capsule 50 mg  50 mg Oral Nightly Michelle Awad MD   50 mg at 
19 2144 
   ondansetron (ZOFRAN-ODT) disintegrating tablet 4 mg  4 mg Oral Q6H PRN Srinivasan Jordan
 DPM   4 mg at 19 0623 
  Or 
   ondansetron (ZOFRAN) injection 4 mg  4 mg Intravenous Q6H PRN Srinivasan Jordan DPM   4 
mg at 19 0638 
   oxybutynin (DITROPAN) tablet 2.5 mg  2.5 mg Oral BID Srinivasan Jordan DPM   2.5 mg at 0
19 0831 
   pantoprazole (PROTONIX) EC tablet 40 mg  40 mg Oral QAM AC Srinivasan Jordan DPM   40 mg
 at 19 0605 
   polyethylene glycol (GLYCOLAX) packet 17 g  17 g Oral Daily PRN GELACIO TerryM   
17 g at 19 0837 
   prochlorperazine tablet 5 mg  5 mg Oral BID PRN Srinivasan Jordan, DPM   5 mg at 19
 1135 
   rOPINIRole (REQUIP) tablet 0.75 mg  0.75 mg Oral Nightly Srinivasan Jordan, DPM   0.75 mg
 at 19 2144 
   sodium chloride (PF) 0.9 % flush 10 mL  10 mL Intravenous Q8H PRN Srinivasan APONTE Jordan, DPM 
  10 mL at 18 0747 
   vancomycin (VANCOCIN) 750 mg/250 mL IVPB  750 mg Intravenous See Admin Instructions Rene
ah H Frost, RPH     
 
 Allergies 
 Allergen Reactions 
   Quinapril Hcl Anaphylaxis 
   Digoxin And Related Other (See Comments) 
   toxic 
   Metaxalone Other (See Comments) 
   Morphine Mental Changes 
   Make her crazy/ "funny feeling in my head"
 Has used hydromorphone in-house 
   Pantoprazole Sodium Nausea and Vomiting 
   Penicillins Rash 
   Quinapril Hcl Edema 
   Facial Edema 
   Sudafed [Pseudoephedrine Hcl] Rash 
 
 Social History 
 
 Social History 
   Marital status:  
   Spouse name: N/A 
   Number of children: N/A 
   Years of education: N/A 
 
 Occupational History 
   Not on file. 
 
 Social History Main Topics 
   Smoking status: Former Smoker 
   Packs/day: 1.00 
   Years: 30.00 
   Types: Cigarettes 
   Quit date: 2004 
   Smokeless tobacco: Never Used 
    Comment: quite smoking  
   Alcohol use No 
   Drug use: No 
   Sexual activity: No 
 
 
 Other Topics Concern 
   Not on file 
 
 Social History Narrative 
  She lives with . 2 kids. Worked in banking 
 
 No smoking. No alcohol intake. Recreational Drug Use
 NO Travel
 mp Pets
 Immunization History 
 Administered Date(s) Administered 
   Hepatitis B Adult 2016 
   Influenza, Trivalent W/Preservative 2016 
   Pneumococcal Polysaccharide 23-valent 2012 
  
 
 Pneumococcal
 Influenza
 
 Family History 
 Problem Relation Age of Onset 
   High cholesterol Father  
   Hypertension Father  
   Diabetes Paternal Grandmother  
   Malig hypertherm Neg Hx  
   Kidney disease Neg Hx  
 
 REVIEW OF SYSTEMS
 
 General: The patient noted to have no  problem of fever,no  weight loss
  and no chills.
 
 Neurologic: Denies any headache, any lightheadedness. No loss of
 
 consciousness or seizure.
 
 Cardiac: with  No Chest Pain,with no  Palpitation,no  Dyspnea on Exertion
 
 Pulmonary: Denies cough, wheezing and hemoptysis
 
 GASTROINTESTINAL: with  nausea no vomiting, and diarrhea.
 GENITOURINARY: Noted no dysuria, urgency, or frequency.
 Hematological : Denies any ecchymosis, bleeding or hematuria
 
 Endocrine: Denies any polyphagia, polyuria or hot and cold intolerance
  Musculoskeletal: no new joint pain and swelling. 
 Skin : Denies any new rashes or cutaneous changes.
 
 Rest of the review of systems is unremarkable.
 
 PHYSICAL EXAMINATION
 
 VITAL SIGNS 
 Wt Readings from Last 3 Encounters: 
 18 68.7 kg (151 lb 7.3 oz) 
 18 65.8 kg (145 lb 1 oz) 
 18 65.9 kg (145 lb 4.5 oz) 
 
 Temp Readings from Last 3 Encounters: 
 19 98.1 F (36.7 C) (Oral) 
 
 18 98.2 F (36.8 C) (Oral) 
 18 97.9 F (36.6 C) (Oral) 
 
 BP Readings from Last 3 Encounters: 
 19 124/61 
 18 139/63 
 18 111/61 
 
 Pulse Readings from Last 3 Encounters: 
 19 76 
 18 74 
 18 85 
 
 Head:  Normocephalic atraumatic
 
 HEENT: Pink palpebral conjunctivae. Anicteric sclerae. No
 tonsillopharyngeal congestion.
 
 Neck : Noted no  Cervical Lymphadenopathy
 
 CHEST:Clear Breath Sounds Bilateral . 
 
 HEART: Regular rate and rhythm. 2/6  murmurs no thrills or rubs
 
 ABDOMEN: Soft, flat. No tenderness. No hepatosplenomegaly.
 
 GROIN AREA: Negative  for intertrigo.
 
 EXTREMITIES: upper extremity no lesions.
 Right lower extremities no edema. Left foot with dressing.
  
 
 NEUROLOGIC: No localizing signs.
 
 Skin : NO rash or ecchymosis.
 
 EEG results
 On 18
  
 This wake EEG is abnormal. Diffuse slowing is 
 suggestive of a diffuse encephalopathy of metabolic, degenerative 
 or vascular origin. No epileptiform activity was noted with body 
 jerking. 
 The absence of epileptiform discharge during the EEG recording 
 does not rule out the diagnosis of a seizure disorder. Clinical 
 correlation is recommended. 
 LABORATORY DATA
 
 Lab Results 
 Component Value Date 
  WBC 5.22 2019 
  HGB 8.4 (L) 2019 
  HCT 25.7 (L) 2019 
   (L) 2019 
  CHOL 101 2017 
  TRIG 132 2017 
  HDL 34 (L) 2017 
  ALT 16 2019 
  AST 21 2019 
   2019 
 
  K 4.5 2019 
   2019 
  CREATININE 4.5 (H) 2019 
  BUN 21 2019 
  CO2 29 2019 
  TSH 1.14 2017 
  INR 1.0 2018 
  GLUF 86 2019 
  HGBA1C 7.5 (H) 2018 
 
 Hepatic Function Panel:  
 Lab Results 
 Component Value Date 
  PROT 5.9 (L) 2019 
  ALB 2.4 (L) 2019 
  BILITOT 0.3 2019 
  BILIDIR 0.1 2017 
  ALP 78 2019 
  AST 21 2019 
  ALT 16 2019 
  
 
 Lipase: 
 Lab Results 
 Component Value Date 
  LIPASE 332 2017 
  
 U/A:  
 Lab Results 
 Component Value Date 
  COLORU YELLOW 2011 
  CLARITYU Slightly Cloudy 10/16/2018 
  MEROPENEM 1.009 2013 
  LEUKOCYTESUR  10/16/2018 
    Comment: 
    small 
  NITRITE Negative 10/16/2018 
  UROBILINOGEN Normal 10/16/2018 
  UPRO  10/16/2018 
    Comment: 
    100 
  UPRO 100 2013 
  PHUR 8 10/16/2018 
  BLOODU Negative 10/16/2018 
  KETONES negative 10/16/2018 
  BILIRUBINUR Negative 10/16/2018 
  GLUCOSEU  10/16/2018 
    Comment: 
    moderate 
  
 DIAGNOSTIC IMAGING
 
 CAT scan 
 
 CT head without contrast [[id number]] Collected: 18 1718 
 Order Status: Completed Updated: 18 1733 
 Narrative:  
 CHERIE VILLALOBOS
 1950
 68 years Female
 
 CT HEAD WO CONTRAST
 2018 5:23 PM
 
 INDICATION: Altered mental status in a patient with acute osteomyelitis
 
 COMPARISON: Head CT, 2017
 
 TECHNIQUE: CT scan of the head without contrast. 5-mm axial noncontrast images were acqui
red from the foramen magnum through the cranial vertex. Multiplanar reformations were obtain
ed from the acquisition data.
 
 At least one of the following CT dose optimization techniques were used: Automated exposure
 control; Adjustment of mA and/or kV according to patient size; Use of iterative reconstruct
ion technique.
 
 FINDINGS:
 
 Brain: No intracranial hemorrhage, midline shift or pathologic mass effect. No cerebral kt
ma, mass lesion or evidence of acute infarct.
 
 Ventricles and extra-axial fluid spaces: Normal.
 
 Paranasal sinuses and mastoid air cells: Normal.
 
 Calvarium and extracranial soft tissues: Normal.
 
 Orbits: The patient is status post bilateral cataract surgery. 
 Impression:  
 1. No acute intracranial pathology. 
 
 Xr Chest 2 View
 
 Result Date: 2018
 1.  Mild diffuse pulmonary edema, similar to the prior exam. 2.  Left perihilar airspace op
acity which may represent edema. Recommend repeat chest x-ray in 6-8 weeks to verify resolut
ion. 3.  Small left pleural effusion and/or pleural scarring, with adjacent atelectasis or c
onsolidation, similar to the prior exam. Small right pleural effusion. 4.  Peripheral opacit
y within the left lower hemithorax, slightly increased which is questionable for a loculated
 pleural fluid, parenchymal opacity, or artifact. Electronically signed by Chau Garcia on 1
2018 5:33 PM
 
 X-ray Foot Left
 
 Result Date: 2018
 Amputation of the left forefoot at the level of the proximal metatarsals. Surgical cutaneou
s staples are present. No radiographic evidence for osteomyelitis. Normal alignment of the h
indfoot. Mild degeneration of the midfoot. Electronically signed by Oleksandr Lemus MD on 2018 10:12 AM
 
 Ct Head Without Contrast
 
 Result Date: 2018
 1.  No acute intracranial pathology. Electronically signed by Steven Samano MD on  5:28 PM
 
 Nm Myocardial Perfusion Spect (stress And Rest)
 
 Result Date: 2018
 1.  Mild to moderate left ventricular dilatation, unchanged between rest and stress. 2.  I 
cannot exclude mid anterior wall infarct. 3.  I see no evidence of ischemia. 4.  Global mode
 
rate hypokinesis. 5.  LVEF 29% stress, 33% rest. Using the risk stratification system at our
 institution, this examination equates to at least a high risk based on this imaging, arisin
g from the criteria below (1). Note that this should be interfaced with clinical and other d
patrizia, potentially affecting final categorization. American Heart Association and American Col
lege of Cardiology Scientific Statement. Circulation (2008); 118: p [id number] Electronically
 signed by Wilfrido Knight MD on 2018 4:34 PM
 
 Ir Angiogram Extremity Left
 
 Result Date: 2018
 1. Aorta with narrow aortic bifurcation with moderate stenosis of proximal left common errol
c artery. 2. Left SFA with moderate stenosis proximally. 3. Single vessel runoff to left jeanne
t via left anterior tibial artery. 4. Left posterior tibial artery and peroneal artery were 
occluded with reconstitution of distal posterior tibial artery at the left ankle via collate
rals. 5. If forefoot amputation does not heal, may need popliteal to posterior tibial artery
 bypass versus antegrade access of left common femoral artery with posterior tibial artery r
ecanalization. Electronically signed by Kirt Mclaughlin MD on 2018 10:51 AM
 
 Cherie Villalobos is a 68 y.o. female with the following Problems 
 Patient Active Problem List 
 Diagnosis 
   Proteinuria 
   Vitamin D deficiency 
   Secondary hyperparathyroidism (HCC) 
   Recurrent UTI 
   Coronary artery disease involving native coronary artery of native heart without angina
 pectoris 
   Depression 
   ESRD (end stage renal disease) (formerly [facility]) 
   Type 2 diabetes mellitus with chronic kidney disease on chronic dialysis, with long-ter
m current use of insulin (HCC) 
   Anemia in ESRD (end-stage renal disease) (formerly [facility]) 
   Hypertension, essential 
   Dyslipidemia 
   Gastroesophageal reflux disease without esophagitis 
   Diabetic foot ulcer (HCC) 
   Loss of weight 
   Dysphagia, unspecified 
   Foot ulcer due to DM  (HCC) 
   Severe protein-calorie malnutrition (HCC) 
   Osteomyelitis of left foot (HCC) 
   Pleural effusion 
   Prolonged Q-T interval on ECG 
   Chronic systolic congestive heart failure (HCC) 
   Chronic diarrhea 
   Chronic anticoagulation 
   Plantar ulcer (HCC) 
   Cardiomyopathy (HCC) 
   COPD (chronic obstructive pulmonary disease) (HCC) 
   ICD (implantable cardioverter-defibrillator) in place 
   Implantable defibrillator reprogramming/check 
   Mixed hyperlipidemia 
   Moderate protein-calorie malnutrition (HCC) 
   Paroxysmal atrial fibrillation (HCC) 
   S/P CABG x 2 
   S/P mitral valve repair 
   Steroid-induced hyperglycemia 
   Stress hyperglycemia 
   Chronic multifocal osteomyelitis of left foot (HCC) 
   PVD (peripheral vascular disease) (formerly [facility]) 
 
   CAD (coronary artery disease) 
 
 Plan:
 
 CBC CMP ESR CRP  Vancomycin trough 
 q Monday 
 Vancomycin 500 mg iv and ceftazidime 2 gm after each hemodialysis
 Follow up in 2 weeks. 
 RECOMMENDATIONS
 continue with t discharge planning for iv vancomycin and ceftazidime for 6 weeks until   
 Discussed with Dr. Vincent hospitalist  who agreed and will proceed with GI consult and cardio
logy follow up. 
 Will sign off reconsult if needed. 
 As plan.
 Thank you very much for the consult.
 ______________________
 
 ______________________
 MD Sukh FRANKLIN Margarita, DO - 2019  7:21 AM PSTFormatting of this note may be different from th
e original.
 [facility]  
 Service:  Cardiology
 Progress Note
 
 Hospital Day:   LOS: 6 days 
 Post-Op Day:  7 Days Post-Op.
 
 SUBJECTIVE
 
      Patient Summary:  Mrs. Villalobos is a 69yo female with a complex cardiac history includin
g CAD s/p CABG, s/p MV repair, ischemic cardiomyopathy with EF 20-25%, ICD placement, paroxy
smal atrial fibrillation, peripheral vascular disease, LBBB, HTN, HLD as well as ESRD, DM II
, COPD who presented for ongoing management of left LE osteomyelitis. She complained of an e
pisode of CP during dialysis which was atypical an possibly GI related. she was found to hav
e an elevated troponin to 0.318, normal CK and CKMB. EKG demonstrated LBBB which is chronic.
 She has had elevations in her troponin in the beginning of December up to 0.257. She underw
ent a pharmacologic stress test at that time which was negative for ischemia. Medical manage
ment was recommended for her existing CAD. 
 
      Events Overnight: Her cognition seems to be improved today. The patient underwent HD y
esterday. She reports that she had some pain in her left arm at her fistula site at the end 
of dialysis. Yesterday evening, she had an episode of chest pain after she ate and a feeling
 of food being stuck in her esophagus. 
 
 Scheduled Medications 
   apixaban  2.5 mg Oral BID 
   aspirin  81 mg Oral Daily with breakfast 
   atorvastatin  40 mg Oral Nightly 
   calcium acetate  667 mg Oral TID WC 
   carvedilol  3.125 mg Oral BID WC 
   cefTAZidime  2 g Intravenous After Hemodialysis (see admin instructions) 
   clopidogrel  75 mg Oral Daily 
   furosemide  20 mg Oral Daily 
   insulin glargine  20 Units Subcutaneous Nightly 
   insulin lispro (human)  0-10 Units Subcutaneous TID AC 
   insulin lispro (human)  0-5 Units Subcutaneous Nightly 
   isosorbide mononitrate  60 mg Oral Daily 
 
   losartan  100 mg Oral Daily 
   nortriptyline  25 mg Oral Daily 
   nortriptyline  50 mg Oral Nightly 
   oxybutynin  2.5 mg Oral BID 
   pantoprazole  40 mg Oral QAM AC 
   rOPINIRole  0.75 mg Oral Nightly 
   vancomycin  750 mg Intravenous See Admin Instructions 
 
 Continuous Infusions 
   dextrose   
 
 PRN Medications
 acetaminophen **OR** acetaminophen, albumin human, albuterol, dextrose, dextrose, dextrose,
 glucagon, glucagon, HYDROcodone-acetaminophen **OR** HYDROcodone-acetaminophen, loperamide,
 LORazepam, nitroGLYCERIN, ondansetron **OR** ondansetron, polyethylene glycol, prochlorpera
zine, saline lock IV - when tolerating PO fluids **AND** sodium chloride (PF)
 
 OBJECTIVE
 Vital Signs:
 /63 (BP Location: Right upper arm)  | Pulse 66  | Temp 97.9 F (36.6 C) (Oral)  | 
Resp 16  | Ht 1.778 m (5' 10")  | Wt 68.7 kg (151 lb 7.3 oz)  | SpO2 98%  | BMI 21.73 kg/m
 
 Temp:  [97.9 F (36.6 C)-98.8 F (37.1 C)] 98.1 F (36.7 C) (724)
 BP: ()/(46-93) 124/61 (724)
 Heart Rate:  [66-86] 76 (724)
 Resp:  [16-18] 18 (724)
 SpO2:  [84 %-100 %] 90 % (724)
 I&O Last 3 Shifts:   1900 - 659
 In: 3511 [P.O.:1550; I.V.:461]
 Out: 1600 [Urine:400]
 Constitutional: frail, NAD, cognition improved
 Neck: No JVD present. No thyromegaly present. 
 Cardiovascular: Regular rhythm, S1 normal and S2 normal.  
 II/VI systolic murmur
 Pulses:
      Carotid pulses are 2+ on the right side, and 2+ on the left side.
      Radial pulses are 2+ on the right side, and 2+ on the left side. 
 Pulmonary/Chest: Effort normal and breath sounds normal. No wheezes. No rales. 
 Abdominal: Soft. No tenderness. 
 Musculoskeletal: Left LE bandaged, right LE with compression stocking, no edema
 Neurological: Alert. No cranial nerve deficit. Spastic movements of her hands improved comp
ared to yesterday.
 Skin: Warm and dry. 
 
 DATA
 
 CBC:  
 Lab Results 
 Component Value Date 
  WBC 5.22 2019 
  RBC 2.48 (L) 2019 
  HGB 8.4 (L) 2019 
  HCT 25.7 (L) 2019 
  .9 (H) 2019 
  MCH 33.8 2019 
  MCHC 32.5 2019 
  RDW 59.5 (H) 2019 
   (L) 2019 
  MPV 9.0 2019 
  DIFFTYPE AUTOMATED 2019 
 
 
 CMP:  
 Lab Results 
 Component Value Date 
   2019 
  K 4.5 2019 
   2019 
  CO2 29 2019 
  ANIONGAP 14 2019 
  GLUF 86 2019 
  BUN 21 2019 
  CREATININE 4.5 (H) 2019 
  BCR 5 2019 
  CA 9.2 2019 
  CA 8.7 2016 
  PROT 5.9 (L) 2019 
  ALB 2.4 (L) 2019 
  GLOB 3.5 2019 
  BILITOT 0.3 2019 
  ALP 78 2019 
  AST 21 2019 
  ALT 16 2019 
  EGFR 10 (L) 2019 
 
 Last 3 Troponin:  
 Lab Results 
 Component Value Date 
  TROPONINI 0.256 (H) 2019 
  TROPONINI 0.274 (H) 2019 
  TROPONINI 0.318 (H) 2019 
 
 ASSESSMENT & PLAN
 
 1. Chest Pain
 2. Elevated Troponin
 3. CAD with history CABG
 4. Osteomyelitis of left foot
 5. ESRD
 6. History MV Repair
 7. Ischemic CMP Ef 20-25%
 8. LBBB
 9. Paroxysmal Afib
 10. History ICD 
 11. PVD
 12. HTN
 13. HLD
 
 - Mrs. Villalobos is a 67 yo female with an extensive cardiac history as outlined above whom ca
rdiology has been asked to consult due to an episode of chest pain during dialysis. She was 
found to have an elevated troponin to 0.318, normal CK and CKMB. She has had elevations in h
er troponin in the beginning of December up to 0.257. She underwent a pharmacologic stress t
est at that time which was negative for ischemia. Her chest pains are atypical and may be GI
 related. 
 - Recommend medical management of existing CAD 
 - Continue ASA, increase Lipitor to 40mg po daily, continue coreg 3.125mg po bid, continue 
imdur 60mg po daily, losartan 100mg po daily. Continue SL nitro PRN.
 - Consider GI workup for her epigastric pain
 - She was recently started on Plavix due to LLE vascular intervention. She is now on triple
 therapy with ASA, Plavix, and Eliquis. Time on triple therapy should be limited to augment 
bleeding risk. Will defer to vascular need for duration of DAPT.
 
 - Consider upgrade of her ICD to CRT-D in the setting of systolic heart failure and LBBB. T
his can be done as outpatient. 
 - Will continue to follow
 
 Margarita Luz DO
 1/3/2019Winston Vincent DO - 2019  1:42 PM PSTFormatting of this note may be different f
rom the original.
 [facility]
 Service:  Hospitalist
 Progress Note
 
 Pt: Cherie Villalobos  AGE/SEX: 68 y.o.   female      : 1950      
 MRN: [id number]          ROOM: 428/428-1   
 TODAY'S DATE: 2019
 
 Hospital Day:   LOS: 5 days 
 SUBJECTIVE
 Patient evaluated at bedside. Patient denies significant foot pain in the left foot. Patien
t had episode of left arm pain and angina symptoms that was resolved with nitroglycerin. Tro
ponin is positive.  Patient was evaluated by cardiology who recommends medical management th
is time. Nephrology to do dialysis today.
 
 Scheduled Medications 
   apixaban  2.5 mg Oral BID 
   aspirin  81 mg Oral Daily with breakfast 
   atorvastatin  40 mg Oral Nightly 
   calcium acetate  667 mg Oral TID WC 
   carvedilol  3.125 mg Oral BID WC 
   cefTAZidime  2 g Intravenous After Hemodialysis (see admin instructions) 
   clopidogrel  75 mg Oral Daily 
   furosemide  20 mg Oral Daily 
   insulin glargine  20 Units Subcutaneous Nightly 
   insulin lispro (human)  0-10 Units Subcutaneous TID AC 
   insulin lispro (human)  0-5 Units Subcutaneous Nightly 
   isosorbide mononitrate  60 mg Oral Daily 
   losartan  100 mg Oral Daily 
   nortriptyline  25 mg Oral Daily 
   nortriptyline  50 mg Oral Nightly 
   oxybutynin  2.5 mg Oral BID 
   pantoprazole  40 mg Oral QAM AC 
   rOPINIRole  0.75 mg Oral Nightly 
   vancomycin  750 mg Intravenous See Admin Instructions 
 
 Continuous Infusions 
   dextrose   
 
 PRN Medications
 acetaminophen **OR** acetaminophen, albumin human, albuterol, dextrose, dextrose, dextrose,
 glucagon, glucagon, HYDROcodone-acetaminophen **OR** HYDROcodone-acetaminophen, loperamide,
 LORazepam, nitroGLYCERIN, ondansetron **OR** ondansetron, polyethylene glycol, prochlorpera
zine, saline lock IV - when tolerating PO fluids **AND** sodium chloride (PF)
 
 Allergy:
 Allergies 
 Allergen Reactions 
   Quinapril Hcl Anaphylaxis 
   Digoxin And Related Other (See Comments) 
   toxic 
   Metaxalone Other (See Comments) 
   Morphine Mental Changes 
 
   Make her crazy/ "funny feeling in my head"
 Has used hydromorphone in-house 
   Pantoprazole Sodium Nausea and Vomiting 
   Penicillins Rash 
   Quinapril Hcl Edema 
   Facial Edema 
   Sudafed [Pseudoephedrine Hcl] Rash 
 
 OBJECTIVE
 Vitals:
 Patient Vitals for the past 24 hrs:
  BP Temp Temp src Pulse Resp SpO2 
 19 1330 114/56 - - 72 16 - 
 19 1315 109/53 - - 70 16 99 % 
 19 1300 - - - - 18 - 
 19 1259 113/53 98.5 F (36.9 C) - 70 18 99 % 
 19 1132 123/60 98.8 F (37.1 C) Oral 76 18 96 % 
 19 0815 124/58 98.5 F (36.9 C) Oral 75 16 99 % 
 19 0315 108/54 98.2 F (36.8 C) Axillary 67 16 98 % 
 19 2241 105/55 98.1 F (36.7 C) Axillary 75 16 98 % 
 19 1904 119/56 98.5 F (36.9 C) Axillary 80 18 98 % 
 19 1647 121/59 - - 76 - 99 % 
 19 1625 137/60 98.7 F (37.1 C) Axillary 76 18 98 % 
 19 1615 129/72 - - 78 18 100 % 
 19 1600 104/54 - - 74 18 100 % 
 19 1545 129/63 - - 76 16 100 % 
 19 1530 129/72 - - 74 16 100 % 
 19 1515 133/55 - - 74 16 99 % 
 19 1500 125/58 - - 74 18 99 % 
 19 1445 125/58 - - 72 18 100 % 
 19 1430 124/61 - - 70 18 99 % 
 19 1418 121/58 - - 74 18 99 % 
 19 1345 128/62 98.1 F (36.7 C) Oral 74 18 - 
 
 I&O Detailed Table: 
 
 Intake/Output Summary (Last 24 hours) at 19 1342
 Last data filed at 19 1144
  Gross per 24 hour 
 Intake              740 ml 
 Output              200 ml 
 Net              540 ml 
 
 No data found.
 
 Hemodynamics Last 24hrs:   
 
 Examination:
 
 Constitutional: Oriented to person, place, and time. appears well-developed and well-nouris
hed. 
 
 HEENT: 
 Head: Normocephalic and atraumatic. 
 Nose: Nose normal. 
 Mouth/Throat: Oropharynx is clear and moist.
 Eyes: Conjunctivae and EOM are normal. Pupils are equal, round, and reactive to light. Righ
t eye exhibits no discharge. Left eye exhibits no discharge. No scleral icterus. 
 Neck: Normal range of motion. Neck supple. No JVD present. No tracheal deviation present. N
o thyromegaly present. no cervical adenopathy.
 
 
 Cardiovascular: Normal rate, regular rhythm, normal heart sounds with S1 and S2, and intact
 distal pulses.  Exam reveals no gallop and no friction rub.  No murmur heard.
 
 Pulmonary/Chest: Effort normal and breath sounds normal. No stridor. No respiratory distres
s.  no wheezes. no rales. exhibits no tenderness. 
 
 Abdominal: Soft. Bowel sounds are normal. exhibits no distension and no mass. There is no t
enderness. There is no rebound and no guarding. 
 
 Extremities/Musculoskeletal: Normal range of motion.exhibits no tenderness.  exhibits no ed
ema. Left leg bandage in place. 
   
 Neurological:  Alert and oriented to person, place, and time. Has normal reflexes.  display
s normal reflexes. No cranial nerve deficit.  Exhibits normal muscle tone. Coordination norm
al. Exhibits twitching 
 
 Skin: Skin is warm and dry. No rash noted. No erythema. No pallor. 
 
 Psychiatric: Has a normal mood and affect. Behavior is normal. Judgment normal. 
 
 LABS:
 WBC 
 Date Value Ref Range Status 
 2019 6.13 3.80 - 11.00 K/uL Final 
 
 RBC 
 Date Value Ref Range Status 
 2019 2.60 (L) 3.70 - 5.10 M/uL Final 
 
 HGB 
 Date Value Ref Range Status 
 2019 8.7 (L) 11.3 - 15.5 g/dL Final 
 
 HCT 
 Date Value Ref Range Status 
 2019 27.0 (L) 34.0 - 46.0 % Final 
 
 MCV 
 Date Value Ref Range Status 
 2019 103.9 (H) 80.0 - 100.0 fl Final 
 
 MCH 
 Date Value Ref Range Status 
 2019 33.6 27.0 - 34.0 pg Final 
 
 MCHC 
 Date Value Ref Range Status 
 2019 32.4 32.0 - 35.5 g/dL Final 
 
 RDW SD 
 Date Value Ref Range Status 
 2019 63.0 (H) 37 - 53 fl Final 
 
 PLT 
 Date Value Ref Range Status 
 2019 156 150 - 400 K/uL Final 
 
 MPV 
 Date Value Ref Range Status 
 
 2019 9.0 fl Final 
 
 NEUTROPHILS ABS 
 Date Value Ref Range Status 
 2019 4.39 1.90 - 7.40 K/uL Final 
 
 LYMPHOCYTES ABS 
 Date Value Ref Range Status 
 2019 0.96 (L) 1.00 - 3.90 K/uL Final 
 
 EOSINOPHILS ABS 
 Date Value Ref Range Status 
 2019 0.00 0.00 - 0.50 K/uL Final 
 
 BASOPHILS ABS 
 Date Value Ref Range Status 
 2019 0.03 0.00 - 0.10 K/uL Final 
   Comment: 
   Testing performed at [facility], [address] [place]  [address] 
 
 Neutrophils Manual 
 Date Value Ref Range Status 
 2019 86 % Final 
 
 Lymphocytes Manual 
 Date Value Ref Range Status 
 2019 10 % Final 
 
 Monocytes Manual 
 Date Value Ref Range Status 
 2019 4 % Final 
 
 MORPHOLOGY 
 Date Value Ref Range Status 
 2019 1+  Final 
   Comment: 
   ANISO
 1+
 MACRO
 NORMAL PLT MORPH
 Testing performed at [facility], [address] [facility], [place]  [address]
  
 
 GLUCOSE 
 Date Value Ref Range Status 
 2019 105 (H) 65 - 99 mg/dL Final 
 
 BUN 
 Date Value Ref Range Status 
 2019 29 (H) 8 - 25 mg/dL Final 
 
 CREATININE 
 Date Value Ref Range Status 
 2019 6.7 (H) 0.50 - 1.00 mg/dL Final 
 
 BUN/CREAT 
 Date Value Ref Range Status 
 2019 4  Final 
 
 TOTAL PROTEIN 
 
 Date Value Ref Range Status 
 2019 6.4 6.3 - 8.2 g/dL Final 
 
 GLOBULIN 
 Date Value Ref Range Status 
 2019 3.9 1.3 - 4.9 g/dL Final 
 
 TBIL 
 Date Value Ref Range Status 
 2019 0.4 0.1 - 1.5 mg/dL Final 
 
 ALT 
 Date Value Ref Range Status 
 2019 17 10 - 65 U/L Final 
 
 AST 
 Date Value Ref Range Status 
 2019 24 10 - 45 U/L Final 
 
 SODIUM 
 Date Value Ref Range Status 
 2019 136 135 - 145 mmol/L Final 
 
 POTASSIUM 
 Date Value Ref Range Status 
 2019 4.8 3.5 - 4.9 mmol/L Final 
 
 CHLORIDE 
 Date Value Ref Range Status 
 2019 98 (L) 99 - 109 mmol/L Final 
 
 CO2 
 Date Value Ref Range Status 
 2019 26 23 - 32 mmol/L Final 
 
 ANION GAP AGAP 
 Date Value Ref Range Status 
 2019 17 5 - 20 mmol/L Final 
 
 [unfilled]
 HDL CHOL 
 Date Value Ref Range Status 
 2017 34 (L) >40 mg/dL Final 
 
 CHOLESTEROL 
 Date Value Ref Range Status 
 2017 101 <200 mg/dL Final 
 
 TSH 
 Date Value Ref Range Status 
 2017 1.14 0.45 - 5.10 uIU/mL Final 
   Comment: 
   Testing performed at [facility];[address] [place] [address];[place] [address] 
 
 TOTAL PROTEIN 
 Date Value Ref Range Status 
 2019 6.4 6.3 - 8.2 g/dL Final 
 
 TBIL 
 Date Value Ref Range Status 
 
 2019 0.4 0.1 - 1.5 mg/dL Final 
 
 BILI, DIRECT 
 Date Value Ref Range Status 
 2017 0.1 0.0 - 0.3 mg/dL Final 
 
 AST 
 Date Value Ref Range Status 
 2019 24 10 - 45 U/L Final 
 
 ALT 
 Date Value Ref Range Status 
 2019 17 10 - 65 U/L Final 
 
 Diagnostic:
 Xr Chest 2 View
 
 Result Date: 2018
 CHERIE VILLALOBOS 1950 68 years Female XR CHEST 2 VIEW FRONTAL AND LATERAL 2018
 5:27 PM INDICATION: Chest pain COMPARISON: 2018, CT chest 2017 TECHNIQUE: Two vie
w chest, PA and lateral views FINDINGS: The heart is stable in size. Patient is post median 
sternotomy. Right cardiac ICD in situ. Mild diffuse coarsening of lung markings. Suspect mil
d underlying interstitial edema. 1.6 cm perihilar opacity noted on the left. Blunting of the
 left costophrenic angle which may be secondary to effusion and/or scarring. Small right ple
ural effusion. Peripheral opacity along the lower lateral hemithorax, slightly increased que
stionable for artifact or loculated pleural fluid. Atelectasis or consolidation within the l
eft lung base, similar to the prior exam. 
 
 1.  Mild diffuse pulmonary edema, similar to the prior exam. 2.  Left perihilar airspace op
acity which may represent edema. Recommend repeat chest x-ray in 6-8 weeks to verify resolut
ion. 3.  Small left pleural effusion and/or pleural scarring, with adjacent atelectasis or c
onsolidation, similar to the prior exam. Small right pleural effusion. 4.  Peripheral opacit
y within the left lower hemithorax, slightly increased which is questionable for a loculated
 pleural fluid, parenchymal opacity, or artifact. Electronically signed by Chau Garcia on 1
2018 5:33 PM
 
 X-ray Foot Left
 
 Result Date: 2018
 CHERIE VILLALOBOS 1950 XR FOOT LEFT 2018 9:54 AM INDICATION: Osteomyelitis of 
the left foot. COMPARISON: 2018 TECHNIQUE: Left foot series, 3 views, PA, obliq
ue and lateral views 
 
 Amputation of the left forefoot at the level of the proximal metatarsals. Surgical cutaneou
s staples are present. No radiographic evidence for osteomyelitis. Normal alignment of the h
indfoot. Mild degeneration of the midfoot. Electronically signed by Oleksandr Lemus MD on 2018 10:12 AM
 
 Nm Myocardial Perfusion Spect (stress And Rest)
 
 Result Date: 2018
 HISTORY: Chest pain. Peripheral vascular disease. Multiple coronary stents. Multiple previo
us myocardial infarctions. CABG. TECHNIQUE: The patient was intravenously injected with 10.5
 millicuries technetium 99m sestamibi. Rest gated supine SPECT images were obtained.  The pa
tient was then intravenously injected with 0.4 mg Regadenason per protocol.    The patient w
as finally intravenously injected with 30.1 mCi technetium 99m sestamibi, and stress gated s
upine SPECT, and prone SPECT scintigraphic images were obtained. COMPARISON: Echocardiogram 
18. Patient was injected on 17 for a rest SPECT study, but no images were perfor
med. Coronary arteriography 18. FINDINGS: Moderate left ventricular dilatation, unchan
ged between rest and stress. Thinning of the proximal inferior wall radiating minimally into
 
 the lateral wall. This is unchanged between rest and stress supine images. Stress prone lanre
ges show resolution of this finding, favoring artifact. There is mild thinning of the mid an
terior wall slightly radiating into the septum, unchanged between rest and stress. I cannot 
exclude infarct. LVEF measures 29% stress, 33% rest. Moderate global hypokinesis. Rest EDV 1
70 mL. Rest  mL. Stress  mL. Stress  mL. Transient ischemic dilatation 
ratio 1.06, normal. 
 
 1.  Mild to moderate left ventricular dilatation, unchanged between rest and stress. 2.  I 
cannot exclude mid anterior wall infarct. 3.  I see no evidence of ischemia. 4.  Global mode
rate hypokinesis. 5.  LVEF 29% stress, 33% rest. Using the risk stratification system at our
 institution, this examination equates to at least a high risk based on this imaging, bri cm from the criteria below (1). Note that this should be interfaced with clinical and other d
patrizia, potentially affecting final categorization. American Heart Association and American Col
lege of Cardiology Scientific Statement. Circulation (2008); 118: p 7769-8906 Electronically
 signed by Wilfrido Knight MD on 2018 4:34 PM
 
 Ir Angiogram Extremity Left
 
 Result Date: 2018
 LOWER EXTREMITY ARTERIOGRAM, UNILATERAL PREOPERATIVE DIAGNOSIS:  Critical limb ischemia and
 need for left forefoot amputation POSTOPERATIVE DIAGNOSIS:  Same PROCEDURE: 1. Moderate con
scious sedation 2. Ultrasound guided access of the right common femoral artery 3. Aortogram 
4. Selective left common femoral artery catheterization with left leg runoff 5. Left common 
iliac artery angioplasty using 6 x 40 mm angioplasty balloon 6. Drug eluting balloon angiopl
asty of left SFA using 4 x 100 mm Lutonix balloon SURGEON: Kirt Mclaughlin MD ASSISTANT: None ANEST
HESIA: Moderate sedation and local anesthesia Informed consent was obtained from the patient
. Continuous cardiac monitoring was performed throughout the procedure. Conscious sedation w
as provided by the nursing staff during the procedure under my supervision. Sedation time: 5
6 minutes Medications: 1 mg Versed IV, 100 mcg Fentanyl IV ESTIMATED BLOOD LOSS: Minimal CON
TRAST:  55 mL of Isovue 250 INDICATIONS:  See preoperative history and physical FINDINGS:  A
jabier with narrow aortic bifurcation. Moderate stenosis of proximal left common iliac artery 
making passing sheath difficult. Left SFA with moderate stenosis proximally. Single vessel r
unoff seen via left anterior tibial artery to foot. The posterior tibial artery does reconst
itute in left ankle via collaterals. Unable to pass sheath over aortic bifurcation due to il
iac disease and small arteries. After angioplasty, moderate angiographic results. Should be 
able to heal left forefoot amputation. If amputation does not heal, may need popliteal to po
sterior tibial artery bypass versus antegrade access of left common femoral artery with post
erior tibial artery recanalization. DESCRIPTION OF PROCEDURE:  The patient was properly iden
tified and brought to the Cath Lab. The patient was placed supine on the catheter table and 
patient was given moderate sedation by nursing staff under my supervision. Patient's bilater
al groins were then prepped and draped in the usual sterile fashion. Local anesthetic was gi
fidelia to the right groin and the right common femoral artery was accessed under ultrasound oksana
dance using a micropuncture needle. A micropuncture sheath was inserted over a wire and up s
ized to a 4 French sheath. A Omni flush catheter was then inserted into the distal aorta and
 aortogram was then performed with the findings as described above. The Omni Flush catheter 
was then used to guide a Glidewire into the left common femoral artery and then the catheter
 was then advanced over the wire. A left lower extremity runoff was then performed with the 
findings as described above. Next, an Amplatzer wire was then inserted over the catheter and
 the 4 French sheath was removed. A 6 French destination sheath was then inserted over the w
antione with the tip of the sheath being placed into the proximal left common iliac artery. The 
sheath would not pass through the diseased left common iliac artery. Angioplasty of the left
 common iliac artery was then performed using 6 x 40 mm angioplasty balloon. However, the sh
eath with still not pass after angioplasty. A vertebral catheter was inserted over the wire 
and the wire was then removed. A Glidewire was then placed into the vertebral catheter and u
sed to cross through the stenotic left superficial femoral artery.  Next, a 260 fix core wir
e was then inserted over the catheter.  Drug eluting balloon angioplasty of the left SFA was
 then performed using 4 x 100 mm Lutonix balloon. Unable to cross left posterior tibial britt
ry occlusion due to tip is sheath only being in left common iliac artery. A final arteriogra
m was then performed through the sheath. The destination sheath was then removed and a Mynx 
closure device was then used on the right common femoral artery and hemostasis was ensured. 
 
The patient was then taken to the observation unit for further monitoring. 
 
 1. Aorta with narrow aortic bifurcation with moderate stenosis of proximal left common errol
c artery. 2. Left SFA with moderate stenosis proximally. 3. Single vessel runoff to left jeanne
t via left anterior tibial artery. 4. Left posterior tibial artery and peroneal artery were 
occluded with reconstitution of distal posterior tibial artery at the left ankle via collate
rals. 5. If forefoot amputation does not heal, may need popliteal to posterior tibial artery
 bypass versus antegrade access of left common femoral artery with posterior tibial artery r
ecanalization. Electronically signed by Kirt Mclaughlin MD on 2018 10:51 AM
 
 Echo Cardiac Adult With Contrast
 
 Result Date: 2018
 Patient Name:  CHERIE VILLALOBOS YOB: 1950 Accession:  [id number] Performing Phys
ician:   Duglas Ornelas MD _______________________________________________________________ IND
ICATIONS ----------- SHORTNESS OF BREATH, DEFINITY WAS USED/SIGNIFICANT PATIENT REACTION FLA
NK PAIN CONCLUSIONS ----------- 1. Overall left ventricular systolic function is severely im
paired with, an EF between 20 - 25 %. 2. There is mild aortic regurgitation. 3. There is mil
d to  moderate aortic stenosis present. 4. Mild-to-moderate mitral regurgitation is present.
 5. There is moderate pulmonary hypertension. FINDINGS -------- Study: A 2-dimensional trans
thoracic echocardiogram with m-mode, spectral and color flow Doppler was perfomed. Study: Th
is was a technically adequate study. Left Ventricle: Overall left ventricular systolic funct
ion is severely impaired with, an EF between 20 - 25 %. Left Ventricle: The left ventricle c
avity size is normal. Left Ventricle: Left ventricular wall thickness is normal. Right Ventr
icle: The right ventricle is mildly enlarged measuring between 3.4 - 3.7 cm. Right Ventricle
: The right ventricular systolic function is at the low end of normal. Right Ventricle: Pace
r/ICD wire seen. Left Atrium: The left atrial size is normal Left Atrium: , and the LA measu
res 4.6cm. Right Atrium: The right atrium is normal in size. Right Atrium: Pacemaker wire se
en in the right atrial cavity. Aortic Valve: The aortic valve is trileaflet. Aortic Valve: T
he aortic valve is mildly calcified. Aortic Valve: There is mild aortic regurgitation. Aorti
c Valve: There is moderate aortic stenosis present. Mitral Valve: Mild-to-moderate mitral re
gurgitation is present. Mitral Valve: Mild mitral annular calcification present. Tricuspid V
alve: The tricuspid valve appears structurally normal. Tricuspid Valve: Mild tricuspid regur
gitation present. Tricuspid Valve: There is moderate pulmonary hypertension. Tricuspid Valve
: The right ventricular systolic pressure (pulmonary artery systolic pressure), as measured 
by Doppler, is 55.99mmHg. Pulmonic Valve: The pulmonic valve was not well visualized. Pulmon
ic Valve: Mild pulmonic regurgitation. Pericardium: There is no pericardial effusion. IVC/He
patic Veins: The IVC is normal size (1.5-2.5cm) and collapses >50% with sniff, consistent wi
 central venous pressures of 5-10mmHg. Contrast: Poor visualization. Definity was used to 
opacify the left ventricular chamber and improve delineation of the endocardial border. DIANE
UREMENTS ------------- CHEIKH Planimetry:   2.21 cm2 AVAI Planimetry:   0.00 cm2/m2 RA Area:   
13.84 cm2 IVC:   1.61 cm LA Major:   5.27 cm EDV(Teich):   121.12 ml IVSd:   0.82 cm LVIDd: 
  5.05 cm LVPWd:   0.88 cm LVOT Diam:   1.87 cm %FS:   9.90 % EF(Teich):   21.60 % ESV(Teich
):   94.95 ml IVSs:   0.96 cm LVIDs:   4.55 cm LVPWs:   1.05 cm SV(Teich):   26.17 ml RA Adan
or:   4.85 cm RVIDd:   3.62 cm LVEF MOD A2C:   23.68 % SV MOD A2C:   32.38 ml LVEF MOD A4C: 
  21.82 % SV MOD A4C:   27.33 ml EF Biplane:   22.47 % LVEDV MOD BP:   131.72 ml LVESV MOD B
P:   102.11 ml LVEDV MOD A2C:   136.73 ml LVLd A2C:   8.73 cm LVEDV MOD A4C:   125.25 ml LVL
d A4C:   8.87 cm LVESV MOD A2C:   104.34 ml LVLs A2C:   8.34 cm LVESV MOD A4C:   97.91 ml LV
Ls A4C:   8.60 cm LAESV(A-L):   48.15 ml LAESV Index (A-L):   26.17 ml/m2 LAAs A2C:   16.24 
cm2 LAESV A-L A2C:   47.34 ml LALs A2C:   4.73 cm LAAs A4C:   16.52 cm2 LAESV A-L A4C:   47.
99 ml LALs A4C:   4.83 cm Ao Diam:   2.59 cm AV Cusp:   1.65 cm LA Diam:   4.60 cm LA/Ao:   
1.77 TAPSE:   1.57 cm IVC diameter:   1.61 cm IVC collapse:   0.68 cm IVC % collapse:   56.1
6 % AR Dec Clermont:   3.95 m/s2 AR Dec Time:   955.07 ms AR maxP.06 mmHg AR PHT:   276.
97 ms AR Vmax:   3.77 m/s HR:   68.91 BPM AV maxP.56 mmHg AV meanPG:   10.23 mmHg AV 
Vmax:   2.21 m/s AV Vmean:   1.53 m/s AV VTI:   47.77 cm CHEIKH Vmax:   1.03 cm2 CHEIKH (VTI):   0
.99 cm2 AVAI Vmax:   0.00 cm2/m2 AVAI (VTI):   0.00 cm2/m2 LVCI Dopp:   1.80 l/minm2 LVCO Do
pp:   3.32 l/min HR:   70.30 BPM LVOT maxP.71 mmHg LVOT meanP.55 mmHg LVSI Dopp:
   25.70 ml/m2 LVSV Dopp:   47.30 ml LVOT Vmax:   0.82 m/s LVOT Vmean:   0.58 m/s LVOT VTI: 
  17.07 cm MCO:   459.56 ms MV A Hiram:   0.83 m/s MV DecT:   232.85 ms MV E Hiram:   1.70 m/s M
V E/A Ratio:   2.05 MV PHT:   80.47 ms MVA By PHT:   2.73 cm2 MV A Dur:   82.77 ms MV maxP.14 mmHg MV meanP.79 mmHg MV Vmax:   1.74 m/s MV Vmean:   1.01 m/s MV VTI:   45.3
3 cm MVA (VTI):   1.04 cm2 Septal e':   0.04 m/s Septal E/e':   38.01 Lateral e':   0.08 m/s
 Lateral E/e':   21.28 P Vein D:   0.84 m/s P Vein S/D Ratio:   0.56 P Vein S:   0.47 m/s PA
EDP:   8.38 mmHg PRend P.38 mmHg PRend Vmax:   1.16 m/s HR:   69.37 BPM PV maxP.
87 mmHg PV meanP.11 mmHg PV Vmax:   0.68 m/s PV Vmean:   0.50 m/s PV VTI:   15.15 cm R
AP:   3 mmHg RV S':   0.08 m/s RVSP:   55.99 mmHg TR maxP.99 mmHg TR Vmax:   3.63 m/s
 TV A Hiram:   0.44 m/s TV Dec Clermont:   6.11 m/s2 TV Dec Time:   133.74 ms TV E Hiram:   0.81 m/
s TV E/A Ratio:   1.81 Sonographer: CEE Authenticated by: Duglas Ornelas MD Report Date/Time: 
2018 17:39:35 
 
 1. Overall left ventricular systolic function is severely impaired with, an EF between 20 -
 25 %. 2. There is mild aortic regurgitation. 3. There is mild to  moderate aortic stenosis 
present. 4. Mild-to-moderate mitral regurgitation is present. 5. There is moderate pulmonary
 hypertension.
 
 [facility]
 
 Result Date: 2018
 This Point of Care (POC) ultrasound image has been reviewed and interpreted by the physicia
n identified as the performing physician in the associated interpretation and report. 
 
 PROBLEM LIST
 Principal Problem:
   Osteomyelitis of left foot (HCC)
 Active Problems:
   Secondary hyperparathyroidism (HCC)
   Depression
   ESRD (end stage renal disease) (HCC)
   Type 2 diabetes mellitus with chronic kidney disease on chronic dialysis, with long-term 
current use of insulin (HCC)
   Anemia in ESRD (end-stage renal disease) (HCC)
   Hypertension, essential
   Diabetic foot ulcer (HCC)
   Chronic systolic congestive heart failure (HCC)
   Chronic anticoagulation
   Cardiomyopathy (HCC)
   Paroxysmal atrial fibrillation (HCC)
   S/P CABG x 2
   S/P mitral valve repair
   PVD (peripheral vascular disease) (HCC)
   CAD (coronary artery disease)
 
 ASSESSMENT & PLAN
 
 Osteomyelitis of left foot SP left TMA by Dr. Jordan on 18. Bone culture grew normal
 skin marzena. However bone pathology is pending. Patient is receiving cefepime with each HD p
er nephrology and ID recommendations. 
 - Dr. Jordan is following patient in the hospital. 
 - ID consult from Dr. Elliott appreciated. Will follow recommendations regarding use of future
 antibiotic.  
 - Continue vanc and ceftazidime 
 
 Phantom pain.Resolved.
 
 Twitching with AMS - currently resolved
 
 ESRD. On HD every T-T-S. Last HD yesterday. Appreciate help from nephrology services. 
 - He to get dialysis today
 
 Type II DM with complications. Most recent blood sugars have been in acceptable range . She
 
 is on Lantus and SSI. Last glycohemoglobin A1c was 7.5 on 18. Will continue current r
egimen of Lantus and Humalog. 
 
 CAD SP CABG, paroxysmal atrial fibrillation, HFrEF. patient had elevation in troponin assoc
iated with chest pain yesterday. Discussed case with cardiology, they recommended medical ma
nagkaren at this time.
 Head lengthy discussion with patient this morning about severity of her cardiac disease.
 -  Will continue apixaban, carvedilol, aspirin, Plavix, losartan and Imdur. 
 - Sublingual nitroglycerin prn
 
 History of PAD. SP left leg angioplasty. On Plavix and aspirin. 
 
 Anemia of chronic kidney disease. Management per nephrology services. 
 
 Disposition: [facility] george Vincent, DO
 2019 1:42 PM
 
 Michelle Awad MD - 2019  1:13 PM PSTFormatting of this note may be different from t
adrianna original.
 428/428-1  
    LOS: 5 days 
 She was admitted with plan for left TMA with Dr. Jordan which she had done 18.
 She was seen on HD with RN, Denys. No complications during HD identified.
 She ate today and did indicate some discomfort and food catching in her upper abdomen witho
ut any vomiting. She did indicate nausea. She has no diarrhea/constipation.
 She has no chest pain. She remains non ambulatory.
 
 PMH, PSH, Social history reviewed in chart. Allergies and medications reviewed. Previous hi
story summarized as above. Prior lab data and imaging reviewed.Patient's old records and lab
s were reviewed in detail and summarized.
 
 ROS:
 Review of systems is as per history of present illness. Otherwise a 14 organ system review 
of systems was done and is negative.
 
 Examination:
 
 APPEARANCE: She is is alert, awake, sitting up in bed in no distress. 
 VITALS: Reviewed as listed.
 HEAD: NC/AT. 
 EYES: EOMI. Non-icteric sclera.
 NECK: No JVD. 
 LUNGS: Effort fair. CTA. 
 HEART: S1 soft, no pericardial rub noted. Systolic murmur at apex with radiation to back.
 ABDOMEN: Soft with minimal distention, no tenderness. No organomegaly or bruits noted. Benoit
l sounds diminished.
 EXTREMITIES: No LE edema. No cyanosis or clubbing. Peripheral pulses not palpable. Left jeanne
t s/p TMA with bandage.
 SKIN: Warm to touch. No rash.
 NEUROLOGIC: Alert, awake, oriented, speech fluent. Gait not tested. 
 PSYCH: pleasant demeanor..
 BACK: No CVA tenderness noted. There is no sacral edema.
 
 Dialysis access
 Left brachiocephalic AV fistula with no evidence of malfunction or inflammation
 
 vital Signs:
 /53  | Pulse 70  | Temp 98.5 F (36.9 C)  | Resp 18  | Ht 1.778 m (5' 10")  | Wt 6
 
8.7 kg (151 lb 7.3 oz)  | SpO2 99%  | BMI 21.73 kg/m 
 
 Data evaluation:
 
 Lab Results 
 Component Value Date 
  BUN 29 (H) 2019 
  CREATININE 6.7 (H) 2019 
  EGFR 6 (L) 2019 
   2019 
  K 4.8 2019 
  CL 98 (L) 2019 
  CO2 26 2019 
  CA 9.1 2019 
  PHOS 4.9 (H) 2018 
  MG 2.2 2018 
  ALB 2.5 (L) 2019 
  HGB 8.7 (L) 2019 
 
 Lab Results 
 Component Value Date 
  HGB 8.7 (L) 2019 
  HGB 8.9 (L) 2019 
  HGB 8.9 (L) 2019 
  FERRITIN 722 (H) 2017 
  FERRITIN 793 (H) 2016 
  FERRITIN 883 (H) 2016 
  LABIRON 28 2017 
  LABIRON 17 2016 
  LABIRON 31 2016 
 
 Lab Results 
 Component Value Date 
  ANIONGAP 17 2019 
 
 Last 3 Troponin:  
 Lab Results 
 Component Value Date 
  TROPONINI 0.256 (H) 2019 
  TROPONINI 0.274 (H) 2019 
  TROPONINI 0.318 (H) 2019 
 
 Us Lower Extremity Arterial Left 2018 showed Greater than 50% stenosis of the mid sup
erficial femoral artery. 2.  Monophasic single runoff to the ankle via the anterior tibial a
rtery.
 
 X-ray Foot Left 2018 showed amputation of the left forefoot at the level of the proxi
mal metatarsals. No radiographic evidence for osteomyelitis. Normal alignment of the hindfoo
t. Mild degeneration of the midfoot. 
 
 Assessment and Recommendations:
 
 1. ESRD on HD
 2. Chronic systolic CHF s/p ICD
 3. Chest pain with concern for angina
 4. Anemia chronic renal failure
 5. Chronic anticoagulation
 6. PAD s/p left TMA
 7. Paroxysmal Afib
 8. CAD s/p CABG
 
 
 She is hemodynamically stable with no fever/tachycardia/bradycardia/hypotension or severe h
ypertension/hypoxia at rest.
 She is euvolemic and CXR from 19 does really show any evidence of hypervolemia although
 she has small pleural effusion.
 BP is OK and she remains in SR on EKG from 18. She remains on BB/ARB and small dose of 
loop diuretic as she is nonoliguric.
 It doesn't look like she had an AMI yesterday although troponin is marginally elevated with
out any significant new EKG changes. She remains on ASA/clopidogrel/apixaban/atorvastatin/lo
ng acting nitrate.
 Gastritis is a consideration. She remains on PPI.
 Metabolic encephalopathy seems to have improved. I reduced her dose of nortriptyline yester
day. She remains on oxybutynin.  However dizziness/confusion/lethary are well described SE.
 Left foot osteomyelitis is s/p TMA. She is afebrile. Dr. Elliott is following and managing her
 Abx dose/duration.
 
  We will plan on 2.5 hours of HD with no UF/lower BFR and 3 K bath to reduce any hemodyna
braden instability. Continue ASA/clopidogrel/apixaban. There would of course with high risk of 
bleeding (both over and internal). We are not using any heparin during dialysis.
  ? GI evaluation given upper abdominal discomfort and nausea. 
  FU with vascular surgery/ID/Podiatry regarding discharge needs.
  Continue current ABx for now
 
 She should be on a 2 gm Na, 2 gm K, 1 gm PO4, 1 gm/kg protein diet.
 Please dose all medications for an eGFR of less than 15 ml/min/1.73 m2.
 NSAIDS/HOPPER 2 inhibitors should be avoided. Aluminum/magnesium containing antacids and magne
sium/phosphate containing enemas should be avoided.
 Fluid should be restricted to less than 1.5 L in a 24 hr period.
 Strict I/O should be done.
 Daily CMP should be done (including Mg, PO4).
 Plan of care was discussed with Dr. Vincent.
 
 MICHELLE AWAD MD
 2019
 
 Portions of my previous notes have been carried over for continuity of care.
 She was seen earlier in the day and charting was completed later after rounds.
 Dictation software, Dragon, was used which may contain error for similar sounding words. Pe
rsonal communication is requested for any clarification.
 Prognosis is guarded in view of multiple comorbid illnesses and ESRD including but not limi
ashly to death.
 
   apixaban  2.5 mg Oral BID 
   aspirin  81 mg Oral Daily with breakfast 
   atorvastatin  40 mg Oral Nightly 
   calcium acetate  667 mg Oral TID WC 
   carvedilol  3.125 mg Oral BID WC 
   cefTAZidime  2 g Intravenous After Hemodialysis (see admin instructions) 
   clopidogrel  75 mg Oral Daily 
   furosemide  20 mg Oral Daily 
   insulin glargine  20 Units Subcutaneous Nightly 
   insulin lispro (human)  0-10 Units Subcutaneous TID AC 
   insulin lispro (human)  0-5 Units Subcutaneous Nightly 
   isosorbide mononitrate  60 mg Oral Daily 
   losartan  100 mg Oral Daily 
   nortriptyline  25 mg Oral Daily 
   nortriptyline  50 mg Oral Nightly 
   oxybutynin  2.5 mg Oral BID 
   pantoprazole  40 mg Oral QAM AC 
   rOPINIRole  0.75 mg Oral Nightly 
 
   vancomycin  750 mg Intravenous See Admin Instructions 
 
   dextrose   
 
 Boni Llanos TYLER - 2019 10:56 AM PSTI( visited with Cherie her friend jennifer was prescorrine
t. Cherie shared since being admitted she has undergone several procedures. She continue to v
omite the medical team is running test to determine the cause. She seemed to be up beat and 
will to engage in conversation. I shared her story and provide spiritual support. Chito Elliott MD - 2019  8:49 AM PSTFormatting of this note may be different from the original.
 
 
 CONSULT NOTE
 2019
 8:49 AM
 
 PRIMARY PHYSICIAN
 Ivy Couch
 
 CONSULT REQUEST FROM
 Winston Vincent DO
 
 HISTORY OF PRESENT ILLNESS
 The patient is a 68 y.o.-year-old female  with history of ESRD on HD via fistula Tu/u/Sa
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
 CABG. Recent LLE angioplasty few weeks ago. She had been on  antibiotics for L foot first d
igit infection with possible osteomyelitis since November (Oral levofloxacin and clindamycin
 then transitioned to IV cefepime with HD until 18, no improvement noted on antibiotics
). She had followed with podiatrist and underwent L TMA on 18, intraoperative findings
 encountered purulence at first metatarsal. 
 Admitted on 18 to [facility]. 
 Wound culture shows skin marzena.
 DAy 6 of iv vancomycin and  Day 2 of ceftazidime
 Had 4 days of cefepime 
 Will complete to iv vancmycin 500 mg iv and ceftazidime 2 gm iv after each hemodialysis uni
tluntil 18 for the infected foot.  
 The patient has problem with tremors on the hands  And on and off confusion. 
 Ct head negative on 18
 EEG done show no seizure just diffuse slowing .
 The patient has some nausea and on and off chest pain stable.  
 Past Medical History 
 Diagnosis Date 
   Acute MI (formerly [facility])  
  with stenting x 1 
   Anemia associated with chronic renal failure 2016 
   Atrial fibrillation (formerly [facility])  
   Chronic kidney disease  
   Congestive heart failure (formerly [facility])  
   COPD (chronic obstructive pulmonary disease) (formerly [facility])  
   COPD (chronic obstructive pulmonary disease) (formerly [facility]) 2018 
   Coronary artery disease  
  stent placements: 3 total 
   Depression  
   Diabetes other 
  abstracted 
   Diabetes mellitus, type 2 (formerly [facility])  
   ESRD on peritoneal dialysis (formerly [facility])  
   GERD (gastroesophageal reflux disease)  
   Hemodialysis patient (formerly [facility]) 10/2016 
  Stopped peritoneal and restarted hemodialysis 
   Hemorrhage of gastrointestinal tract, unspecified 2017 
 
  low GI, rectal bleeding 
   High blood pressure other 
  abstracted 
   High cholesterol other 
  abstracted 
   Hyperlipidemia  
   Hypertension  
   Hyponatremia 2017 
   Myocardial infarction (formerly [facility]) 2017 
   Other chronic pain  
  feet,  
   Pain of left hip joint 2016 
  due to hip fracture and replacement 
   Partial small bowel obstruction (formerly [facility]) 2017 
  resolved 
   Renal failure  
  Stage 5.   Fistula in the JAN - not on dialysis yet. 
   Unspecified visual disturbance  
  glasses 
 
 Past Surgical History 
 Procedure Laterality Date 
   AV FISTULA PLACEMENT   
  left upper arm AV fistula - failed 
   AV FISTULA REPAIR N/A 10/25/2016 
  Procedure: AV FISTULA - GRAFT REPAIR/REVISION;  Surgeon: Kirt Mclaughlin MD;  Location: [facility]
IN OR;  Service: Vascular;  Laterality: N/A;  Superficialization and revision of left arm fi
stula 
   CARDIAC CATHETERIZATION  2017 
   CARPAL TUNNEL RELEASE Bilateral other 
  abstracted 
   CATARACT EXTRACTION Bilateral 2007 
   CATHETER REMOVAL N/A 2016 
  Procedure: DIALYSIS CATHETER - REMOVAL;  Surgeon: Kirt Mclaughlin MD;  Location: [facility] MAIN OR; 
 Service: Vascular;  Laterality: N/A;  PD cath removal 
   CHOLECYSTECTOMY  other 
  abstracted 
   COLONOSCOPY   
   CORONARY ARTERY BYPASS GRAFT   
   CORONARY STENT PLACEMENT  2017 
  Drug Elutin stents to OM, 1 stent to prox. LAD 
   EYE SURGERY   
   FOOT AMPUTATION Left 2018 
  Procedure: FOREFOOT - AMPUTATION;  Surgeon: Srinivasan Jordan DPM;  Location: [facility] MAIN OR;
  Service: Podiatry;  Laterality: Left; 
   HARDWARE PRESENT   
  stent placements x 3, Left hip replacement, vascular stents 2 in left leg 
   HIP ARTHROPLASTY Left 2016 
  Procedure: HIP - ARTHROPLASTY(ALLISON);  Surgeon: Uriel Roa MD;  Location: [facility] MAIN 
OR;  Service: Orthopedics;  Laterality: Left; 
   HYSTERECTOMY   
  complete 
   PACEMAKER INSERTION   
  [employer] pacemaker/defib 
   PERITONEAL CATHETER INSERTION  8/15/2013 
   PERITONEAL CATHETER INSERTION N/A 2016 
  Procedure: LAPAROSCOPIC - PERITONEAL DIALYSIS CATH INSERTION;  Surgeon: Kirt Mclaughlin MD;  Lo
cation: [facility] MAIN OR;  Service: Vascular;  Laterality: N/A;  Removal of old catheter 
   SHOULDER SURGERY Right  
  frozen shoulder 
 
   UNLISTED PROCEDURE ARTHROSCOPY   
  total Left hip 
   vascular stents Left 2017 
  leg (2 stents) 
   VASCULAR SURGERY   
 
 Current Facility-Administered Medications 
 Medication Dose Route Frequency Provider Last Rate Last Dose 
   acetaminophen (TYLENOL) tablet 650 mg  650 mg Oral Q6H PRN Srinivasan Jordan DPM     
  Or 
   acetaminophen (TYLENOL) suppository 650 mg  650 mg Rectal Q6H PRN Srinivasan Jordan DPM 
    
   albumin human 25 % solution 12.5 g  12.5 g Intravenous Q1H PRN Michelle Awad MD   12.5
 g at 19 1545 
   albuterol (PROVENTIL HFA;VENTOLIN HFA) inhaler  2 puff Inhalation Q4H PRN Srinivasan Dhillon
anREBEKAH     
   apixaban (ELIQUIS) tablet 2.5 mg  2.5 mg Oral BID Srinivasan Jordan DPM   2.5 mg at  0743 
   aspirin EC tablet 81 mg  81 mg Oral Daily with breakfast Srinivasan Jordan DPM   81 mg a
t 19 0742 
   atorvastatin (LIPITOR) tablet 40 mg  40 mg Oral Nightly Margarita Luz DO     
   calcium acetate (PHOSLO) capsule 667 mg  667 mg Oral TID WC Srinivasan Jordan DPM   667 
mg at 19 0743 
   carvedilol (COREG) tablet 3.125 mg  3.125 mg Oral BID  Srinivasan Jordan DPM   3.125 m
g at 19 0743 
   cefTAZidime (FORTAZ) 2 g in sodium chloride (IV) 0.9 % 50 mL IVPB  2 g Intravenous Afte
r Hemodialysis (see admin instructions) Andrés Elliott MD     
   clopidogrel (PLAVIX) tablet 75 mg  75 mg Oral Daily Srinivasan Jordan DPM   75 mg at  0743 
   dextrose 10 % infusion   Intravenous Continuous PRN Srinivasan Jordan DPM     
   dextrose 50 % solution 12 mL  12 mL Intravenous PRN Srinivasan Jordan DPM     
   dextrose 50 % solution 25 mL  25 mL Intravenous PRN Srinivasan Jordan DPM     
   furosemide (LASIX) tablet 20 mg  20 mg Oral Daily Srinivasan Jordan DPM   20 mg at  0744 
   glucagon (GLUCAGEN) injection 0.5 mg  0.5 mg Intramuscular PRN Srinivasan Jordan DPM    
 
   glucagon (GLUCAGEN) injection 1 mg  1 mg Intramuscular PRN Srinivasan Jordan DPM     
   HYDROcodone-acetaminophen (NORCO) 5-325 MG per tablet 1 tablet  1 tablet Oral Q4H PRN JESSICA Jordan DPM   1 tablet at 18 2114 
  Or 
   HYDROcodone-acetaminophen (NORCO)  MG per tablet 1 tablet  1 tablet Oral Q4H PRN 
Srinivasan Jordan DPM   1 tablet at 18 1446 
   insulin glargine (LANTUS) injection 20 Units  20 Units Subcutaneous Nightly Augustin Bourne MD   20 Units at 19 2200 
   insulin lispro (human) (HUMALOG) injection 0-10 Units  0-10 Units Subcutaneous TID AC B
axel Jordan DPM   Stopped at 19 0546 
   insulin lispro (human) (HUMALOG) injection 0-5 Units  0-5 Units Subcutaneous Nightly Br
mario Jordan DPM   Stopped at 19 2119 
   isosorbide mononitrate (IMDUR) 24 hr tablet 60 mg  60 mg Oral Daily GELACIO Terry   60 mg at 19 0743 
   loperamide (IMODIUM) capsule 2 mg  2 mg Oral PRN Srinivasan Jordan, DPM     
   LORazepam (ATIVAN) tablet 1 mg  1 mg Oral Daily PRN Srinivasan Jordan, DPM   1 mg at 01 1822 
   losartan (COZAAR) tablet 100 mg  100 mg Oral Daily Srinivasan Jordan, DPM   100 mg at  0742 
   nitroGLYCERIN (NITROSTAT) SL tablet 0.4 mg  0.4 mg Sublingual Q5 Min PRN Srinivasan navas, DPM   0.4 mg at 19 1630 
   nortriptyline (PAMELOR) capsule 25 mg  25 mg Oral Daily Srinivasan Jordan DPM   25 mg at
 19 0743 
   nortriptyline (PAMELOR) capsule 50 mg  50 mg Oral Nightly Michelle Awad MD   50 mg at 
 
19 220 
   ondansetron (ZOFRAN-ODT) disintegrating tablet 4 mg  4 mg Oral Q6H PRN Srinivasan Jordan
 DPM   4 mg at 19 0623 
  Or 
   ondansetron (ZOFRAN) injection 4 mg  4 mg Intravenous Q6H PRN Srinivasan Jordan, DPM     
   oxybutynin (DITROPAN) tablet 2.5 mg  2.5 mg Oral BID Srinivasan Jordan, DPM   2.5 mg at 0
19 0742 
   pantoprazole (PROTONIX) EC tablet 40 mg  40 mg Oral QAM AC Srinivasan Jordan, DPM   40 mg
 at 19 0548 
   polyethylene glycol (GLYCOLAX) packet 17 g  17 g Oral Daily PRN Srinivasan Jordan, DPM   
  
   prochlorperazine tablet 5 mg  5 mg Oral BID PRN Srinivasan Jordan, DPM     
   rOPINIRole (REQUIP) tablet 0.75 mg  0.75 mg Oral Nightly Srinivasan Jordan, DPM   0.75 mg
 at 19 2200 
   sodium chloride (PF) 0.9 % flush 10 mL  10 mL Intravenous Q8H PRN Srinivasan Jordan, DPM 
  10 mL at 18 0747 
   vancomycin (VANCOCIN) 750 mg/250 mL IVPB  750 mg Intravenous See Admin Instructions Rene
 [facility]     
 
 Allergies 
 Allergen Reactions 
   Quinapril Hcl Anaphylaxis 
   Digoxin And Related Other (See Comments) 
   toxic 
   Metaxalone Other (See Comments) 
   Morphine Mental Changes 
   Make her crazy/ "funny feeling in my head"
 Has used hydromorphone in-house 
   Pantoprazole Sodium Nausea and Vomiting 
   Penicillins Rash 
   Quinapril Hcl Edema 
   Facial Edema 
   Sudafed [Pseudoephedrine Hcl] Rash 
 
 Social History 
 
 Social History 
   Marital status:  
   Spouse name: N/A 
   Number of children: N/A 
   Years of education: N/A 
 
 Occupational History 
   Not on file. 
 
 Social History Main Topics 
   Smoking status: Former Smoker 
   Packs/day: 1.00 
   Years: 30.00 
   Types: Cigarettes 
   Quit date: 2004 
   Smokeless tobacco: Never Used 
    Comment: quite smoking  
   Alcohol use No 
   Drug use: No 
   Sexual activity: No 
 
 Other Topics Concern 
   Not on file 
 
 
 Social History Narrative 
  She lives with . 2 kids. Worked in banking 
 
 No smoking. No alcohol intake. Recreational Drug Use
 NO Travel
 mp Pets
 Immunization History 
 Administered Date(s) Administered 
   Hepatitis B Adult 2016 
   Influenza, Trivalent W/Preservative 2016 
   Pneumococcal Polysaccharide 23-valent 2012 
  
 
 Pneumococcal
 Influenza
 
 Family History 
 Problem Relation Age of Onset 
   High cholesterol Father  
   Hypertension Father  
   Diabetes Paternal Grandmother  
   Malig hypertherm Neg Hx  
   Kidney disease Neg Hx  
 
 REVIEW OF SYSTEMS
 
 General: The patient noted to have no  problem of fever,no  weight loss
  and no chills.
 
 Neurologic: Denies any headache, any lightheadedness. No loss of
 
 consciousness or seizure.
 
 Cardiac: with  No Chest Pain,with no  Palpitation,no  Dyspnea on Exertion
 
 Pulmonary: Denies cough, wheezing and hemoptysis
 
 GASTROINTESTINAL: with  nausea no vomiting, and diarrhea.
 GENITOURINARY: Noted no dysuria, urgency, or frequency.
 Hematological : Denies any ecchymosis, bleeding or hematuria
 
 Endocrine: Denies any polyphagia, polyuria or hot and cold intolerance
  Musculoskeletal: no new joint pain and swelling. 
 Skin : Denies any new rashes or cutaneous changes.
 
 Rest of the review of systems is unremarkable.
 
 PHYSICAL EXAMINATION
 
 VITAL SIGNS 
 Wt Readings from Last 3 Encounters: 
 18 68.7 kg (151 lb 7.3 oz) 
 18 65.8 kg (145 lb 1 oz) 
 18 65.9 kg (145 lb 4.5 oz) 
 
 Temp Readings from Last 3 Encounters: 
 19 98.5 F (36.9 C) (Oral) 
 18 98.2 F (36.8 C) (Oral) 
 18 97.9 F (36.6 C) (Oral) 
 
 
 BP Readings from Last 3 Encounters: 
 19 124/58 
 18 139/63 
 18 111/61 
 
 Pulse Readings from Last 3 Encounters: 
 19 75 
 18 74 
 18 85 
 
 Head:  Normocephalic atraumatic
 
 HEENT: Pink palpebral conjunctivae. Anicteric sclerae. No
 tonsillopharyngeal congestion.
 
 Neck : Noted no  Cervical Lymphadenopathy
 
 CHEST:Clear Breath Sounds Bilateral . 
 
 HEART: Regular rate and rhythm. No murmurs thrills or rubs
 
 ABDOMEN: Soft, flat. No tenderness. No hepatosplenomegaly.
 
 GROIN AREA: Negative  for intertrigo.
 
 EXTREMITIES: upper extremity no lesion s.
 Right lower extremities no edema. Left foot with dressing.
  
 
 NEUROLOGIC: No localizing signs.
 
 Skin : NO rash or ecchymosis.
 
 EEG results
 On 18
  
 This wake EEG is abnormal. Diffuse slowing is 
 suggestive of a diffuse encephalopathy of metabolic, degenerative 
 or vascular origin. No epileptiform activity was noted with body 
 jerking. 
 The absence of epileptiform discharge during the EEG recording 
 does not rule out the diagnosis of a seizure disorder. Clinical 
 correlation is recommended. 
 LABORATORY DATA
 
 Lab Results 
 Component Value Date 
  WBC 6.13 2019 
  HGB 8.7 (L) 2019 
  HCT 27.0 (L) 2019 
   2019 
  CHOL 101 2017 
  TRIG 132 2017 
  HDL 34 (L) 2017 
  ALT 17 2019 
  AST 24 2019 
   2019 
  K 4.8 2019 
  CL 98 (L) 2019 
  CREATININE 6.7 (H) 2019 
 
  BUN 29 (H) 2019 
  CO2 26 2019 
  TSH 1.14 2017 
  INR 1.0 2018 
  GLUF 105 (H) 2019 
  HGBA1C 7.5 (H) 2018 
 
 Hepatic Function Panel:  
 Lab Results 
 Component Value Date 
  PROT 6.4 2019 
  ALB 2.5 (L) 2019 
  BILITOT 0.4 2019 
  BILIDIR 0.1 2017 
  ALP 79 2019 
  AST 24 2019 
  ALT 17 2019 
  
 
 Lipase: 
 Lab Results 
 Component Value Date 
  LIPASE 332 2017 
  
 U/A:  
 Lab Results 
 Component Value Date 
  COLORU YELLOW 2011 
  CLARITYU Slightly Cloudy 10/16/2018 
  MEROPENEM 1.009 2013 
  LEUKOCYTESUR  10/16/2018 
    Comment: 
    small 
  NITRITE Negative 10/16/2018 
  UROBILINOGEN Normal 10/16/2018 
  UPRO  10/16/2018 
    Comment: 
    100 
  UPRO 100 2013 
  PHUR 8 10/16/2018 
  BLOODU Negative 10/16/2018 
  KETONES negative 10/16/2018 
  BILIRUBINUR Negative 10/16/2018 
  GLUCOSEU  10/16/2018 
    Comment: 
    moderate 
  
 DIAGNOSTIC IMAGING
 
 CAT scan 
 
 CT head without contrast [[id number]] Collected: 18 
 Order Status: Completed Updated: 18 
 Narrative:  
 CHERIE VILLALOBOS
 1950
 68 years Female
 CT HEAD WO CONTRAST
 2018 5:23 PM
 
 
 INDICATION: Altered mental status in a patient with acute osteomyelitis
 
 COMPARISON: Head CT, 2017
 
 TECHNIQUE: CT scan of the head without contrast. 5-mm axial noncontrast images were acqui
red from the foramen magnum through the cranial vertex. Multiplanar reformations were obtain
ed from the acquisition data.
 
 At least one of the following CT dose optimization techniques were used: Automated exposure
 control; Adjustment of mA and/or kV according to patient size; Use of iterative reconstruct
ion technique.
 
 FINDINGS:
 
 Brain: No intracranial hemorrhage, midline shift or pathologic mass effect. No cerebral kt
ma, mass lesion or evidence of acute infarct.
 
 Ventricles and extra-axial fluid spaces: Normal.
 
 Paranasal sinuses and mastoid air cells: Normal.
 
 Calvarium and extracranial soft tissues: Normal.
 
 Orbits: The patient is status post bilateral cataract surgery. 
 Impression:  
 1. No acute intracranial pathology. 
 
 Xr Chest 2 View
 
 Result Date: 2018
 1.  Mild diffuse pulmonary edema, similar to the prior exam. 2.  Left perihilar airspace op
acity which may represent edema. Recommend repeat chest x-ray in 6-8 weeks to verify resolut
ion. 3.  Small left pleural effusion and/or pleural scarring, with adjacent atelectasis or c
onsolidation, similar to the prior exam. Small right pleural effusion. 4.  Peripheral opacit
y within the left lower hemithorax, slightly increased which is questionable for a loculated
 pleural fluid, parenchymal opacity, or artifact. Electronically signed by Chau Garcia on 1
2018 5:33 PM
 
 X-ray Foot Left
 
 Result Date: 2018
 Amputation of the left forefoot at the level of the proximal metatarsals. Surgical cutaneou
s staples are present. No radiographic evidence for osteomyelitis. Normal alignment of the h
indfoot. Mild degeneration of the midfoot. Electronically signed by Oleksandr Lemus MD on 2018 10:12 AM
 
 Ct Head Without Contrast
 
 Result Date: 2018
 1.  No acute intracranial pathology. Electronically signed by Steven Samano MD on  5:28 PM
 
 Nm Myocardial Perfusion Spect (stress And Rest)
 
 Result Date: 2018
 1.  Mild to moderate left ventricular dilatation, unchanged between rest and stress. 2.  I 
cannot exclude mid anterior wall infarct. 3.  I see no evidence of ischemia. 4.  Global mode
rate hypokinesis. 5.  LVEF 29% stress, 33% rest. Using the risk stratification system at our
 institution, this examination equates to at least a high risk based on this imaging, arisin
g from the criteria below (1). Note that this should be interfaced with clinical and other d
 
patrizia, potentially affecting final categorization. American Heart Association and American Col
lege of Cardiology Scientific Statement. Circulation (2008); 118: p 9890-1007 Electronically
 signed by Wilfrido Knight MD on 2018 4:34 PM
 
 Ir Angiogram Extremity Left
 
 Result Date: 2018
 1. Aorta with narrow aortic bifurcation with moderate stenosis of proximal left common errol
c artery. 2. Left SFA with moderate stenosis proximally. 3. Single vessel runoff to left jeanne
t via left anterior tibial artery. 4. Left posterior tibial artery and peroneal artery were 
occluded with reconstitution of distal posterior tibial artery at the left ankle via collate
rals. 5. If forefoot amputation does not heal, may need popliteal to posterior tibial artery
 bypass versus antegrade access of left common femoral artery with posterior tibial artery r
ecanalization. Electronically signed by Kirt Mclaughlin MD on 2018 10:51 AM
 
 Cherie Villalobos is a 68 y.o. female with the following Problems 
 Patient Active Problem List 
 Diagnosis 
   Proteinuria 
   Vitamin D deficiency 
   Secondary hyperparathyroidism (HCC) 
   Recurrent UTI 
   Coronary artery disease involving native coronary artery of native heart without angina
 pectoris 
   Depression 
   ESRD (end stage renal disease) (formerly [facility]) 
   Type 2 diabetes mellitus with chronic kidney disease on chronic dialysis, with long-ter
m current use of insulin (HCC) 
   Anemia in ESRD (end-stage renal disease) (formerly [facility]) 
   Hypertension, essential 
   Anemia of chronic renal failure, stage 5 (HCC) 
   Dyslipidemia 
   Gastroesophageal reflux disease without esophagitis 
   Diabetic foot ulcer (HCC) 
   Loss of weight 
   Dysphagia, unspecified 
   Foot ulcer due to DM  (formerly [facility]) 
   Severe protein-calorie malnutrition (formerly [facility]) 
   Osteomyelitis of left foot (formerly [facility]) 
   Pleural effusion 
   Prolonged Q-T interval on ECG 
   Chronic systolic congestive heart failure (HCC) 
   Chronic diarrhea 
   Chronic anticoagulation 
   Plantar ulcer (HCC) 
   Cardiomyopathy (HCC) 
   COPD (chronic obstructive pulmonary disease) (formerly [facility]) 
   ICD (implantable cardioverter-defibrillator) in place 
   Implantable defibrillator reprogramming/check 
   Mixed hyperlipidemia 
   Moderate protein-calorie malnutrition (HCC) 
   Paroxysmal atrial fibrillation (HCC) 
   S/P CABG x 2 
   S/P mitral valve repair 
   Steroid-induced hyperglycemia 
   Stress hyperglycemia 
   Chronic multifocal osteomyelitis of left foot (HCC) 
   PVD (peripheral vascular disease) (formerly [facility]) 
   CAD (coronary artery disease) 
 
 
 Plan:
 
 CBC CMP ESR CRP 
 q Monday 
 Vancomycin 500 mg iv and ceftazidime 2 gm after each hemodialysis
 Follow up in 2 weeks. 
 RECOMMENDATIONS
 continue with t discharge planning for iv vancomycin and ceftazidime for 6 weeks until   
 
 Discussed with Dr. Vincent hospitalist  who agreed and will proceed
 As plan.
 Thank you very much for the consult.
 ______________________
 
 ______________________
 MD Delmi FRANKLIN Puneet, MD - 2019  3:00 PM PSTLate entry:
 428/428-1  
    LOS: 4 days 
 She was admitted with plan for left TMA with Dr. Jordan which she had done 18.
 She was seen on dialysis with RNMarielena.
 She initially had complained of not feeling well earlier prior to starting dialysis. After 
initiation of dialysis she was hemodynamically stable with no tachycardia/hypotension but co
ntinued not feeling well. She was not nauseous or cramping. On tele she had SR but began to 
have few PVC's.
 BS on check was 106.
 At that time access was functioning well.
 Examination at that time was unremarkable with no new physical findings.
 UF was reduced and then discontinued and she was given albumin.
 Subsequently she began to have severe left sided chest pain and my RN called and dialysis w
as discontinued given concern with AMI.
 Troponin was ordered and EKG was planned.
 Plan of care was discussed with Dr. Almanzar.
 
 PMH, PSH, Social history reviewed in chart. Allergies and medications reviewed. Previous hi
story summarized as above. Prior lab data and imaging reviewed.Patient's old records and lab
s were reviewed in detail and summarized.
 
 ROS:
 Review of systems is as per history of present illness. Otherwise a 14 organ system review 
of systems was done and is negative.
 Us Lower Extremity Arterial Left 2018 showed Greater than 50% stenosis of the mid sup
erficial femoral artery. 2.  Monophasic single runoff to the ankle via the anterior tibial a
rtery.
 
 X-ray Foot Left 2018 showed amputation of the left forefoot at the level of the proxi
mal metatarsals. No radiographic evidence for osteomyelitis. Normal alignment of the hindfoo
t. Mild degeneration of the midfoot. 
 
 Assessment and Recommendations:
 
 1. ESRD on HD
 2. Left sided arm pain/feeling of impending doom
 3. Chronic systolic CHF
 4. Anemia chronic renal failure
 5. Chronic anticoagulation with Apixaban
 6. PAD s/p left TMA
 7. Paroxysmal Afib
 
 8. CAD s/p CABG
 
 Concern is with ACS.
 We will hold dialysis today.
 Plan for EKG/serial troponin.
 She is already on significant antiplatelet/anticoagulant Rx with ASA, Clopidogrel, Apixaban
 and is on BB (Carvedilol) and statin (atorvastatin) along with long acting nitrates.
 
  Will consider dialysis tomorrow if she is more hemodynamically stable and there is no AM
I.
  Cardiology evaluation.
 
 She should be on a 2 gm Na, 2 gm K, 1 gm PO4, 1 gm/kg protein diet.
 Please dose all medications for an eGFR of less than 15 ml/min/1.73 m2.
 NSAIDS/HOPPER 2 inhibitors should be avoided. Aluminum/magnesium containing antacids and magne
sium/phosphate containing enemas should be avoided.
 Fluid should be restricted to less than 1.5 L in a 24 hr period.
 Strict I/O should be done.
 Daily CMP should be done (including Mg, PO4).
 Plan of care was discussed with Dr. Vincent.
 
 MICHELLE AWAD MD
 2019
 
 Portions of my previous notes have been carried over for continuity of care.
 She was seen earlier in the day and charting was completed later after rounds.
 Dictation software, Dragon, was used which may contain error for similar sounding words. Pe
rsonal communication is requested for any clarification.
 Prognosis is guarded in view of multiple comorbid illnesses and ESRD including but not limi
ashly to death.
 
 Winston Vincent, DO - 2019 12:28 PM PSTFormatting of this note may be different from the 
original.
 [facility]
 Service:  Hospitalist
 Progress Note
 
 Pt: Cherie Villalobos  AGE/SEX: 68 y.o.   female      : 1950      
 MRN: [id number]          ROOM: [facility]/428-1   
 TODAY'S DATE: 2019
 
 Hospital Day:   LOS: 4 days 
 SUBJECTIVE
 Patient evaluated at bedside. Patient denies significant foot pain in the left foot. Patien
t having twitching movements with AMS. CT head negative for any acute findings. Patient slee
py today. 
 
 Scheduled Medications 
   apixaban  2.5 mg Oral BID 
   aspirin  81 mg Oral Daily with breakfast 
   atorvastatin  20 mg Oral Nightly 
   calcium acetate  667 mg Oral TID WC 
   carvedilol  3.125 mg Oral BID WC 
   cefTAZidime  2 g Intravenous After Hemodialysis (see admin instructions) 
   clopidogrel  75 mg Oral Daily 
   furosemide  20 mg Oral Daily 
   insulin glargine  20 Units Subcutaneous Nightly 
   insulin lispro (human)  0-10 Units Subcutaneous TID AC 
   insulin lispro (human)  0-5 Units Subcutaneous Nightly 
   isosorbide mononitrate  60 mg Oral Daily 
 
   losartan  100 mg Oral Daily 
   nortriptyline  25 mg Oral Daily 
   nortriptyline  75 mg Oral Nightly 
   oxybutynin  2.5 mg Oral BID 
   pantoprazole  40 mg Oral QAM AC 
   rOPINIRole  0.75 mg Oral Nightly 
   vancomycin (VANCOCIN) IVPB < 1 G  750 mg Intravenous Once 
   vancomycin  750 mg Intravenous See Admin Instructions 
 
 Continuous Infusions 
   dextrose   
 
 PRN Medications
 acetaminophen **OR** acetaminophen, albumin human, albuterol, dextrose, dextrose, dextrose,
 glucagon, glucagon, HYDROcodone-acetaminophen **OR** HYDROcodone-acetaminophen, loperamide,
 LORazepam, nitroGLYCERIN, ondansetron **OR** ondansetron, polyethylene glycol, prochlorpera
zine, saline lock IV - when tolerating PO fluids **AND** sodium chloride (PF)
 
 Allergy:
 Allergies 
 Allergen Reactions 
   Quinapril Hcl Anaphylaxis 
   Digoxin And Related Other (See Comments) 
   toxic 
   Metaxalone Other (See Comments) 
   Morphine Mental Changes 
   Make her crazy/ "funny feeling in my head"
 Has used hydromorphone in-house 
   Pantoprazole Sodium Nausea and Vomiting 
   Penicillins Rash 
   Quinapril Hcl Edema 
   Facial Edema 
   Sudafed [Pseudoephedrine Hcl] Rash 
 
 OBJECTIVE
 Vitals:
 Patient Vitals for the past 24 hrs:
  BP Temp Temp src Pulse Resp SpO2 
 19 1052 117/56 97.8 F (36.6 C) Oral 75 18 98 % 
 19 0729 138/61 97.8 F (36.6 C) Oral 87 18 94 % 
 19 0245 120/56 98.4 F (36.9 C) Oral 86 14 96 % 
 18 2307 105/54 97.6 F (36.4 C) Axillary 73 16 96 % 
 18 1900 116/85 99.8 F (37.7 C) Oral 93 16 97 % 
 18 1526 112/56 97.9 F (36.6 C) Oral 67 16 92 % 
 
 I&O Detailed Table: 
 
 Intake/Output Summary (Last 24 hours) at 19 1228
 Last data filed at 19 0809
  Gross per 24 hour 
 Intake              240 ml 
 Output              200 ml 
 Net               40 ml 
 
 Patient Vitals for the past 96 hrs:
  Weight 
 18 1549 68.7 kg (151 lb 7.3 oz) 
 
 Hemodynamics Last 24hrs:   
 
 
 Examination:
 
 Constitutional: Oriented to person, place, and time. appears well-developed and well-nouris
hed. 
 
 HEENT: 
 Head: Normocephalic and atraumatic. 
 Nose: Nose normal. 
 Mouth/Throat: Oropharynx is clear and moist.
 Eyes: Conjunctivae and EOM are normal. Pupils are equal, round, and reactive to light. Righ
t eye exhibits no discharge. Left eye exhibits no discharge. No scleral icterus. 
 Neck: Normal range of motion. Neck supple. No JVD present. No tracheal deviation present. N
o thyromegaly present. no cervical adenopathy.
 
 Cardiovascular: Normal rate, regular rhythm, normal heart sounds with S1 and S2, and intact
 distal pulses.  Exam reveals no gallop and no friction rub.  No murmur heard.
 
 Pulmonary/Chest: Effort normal and breath sounds normal. No stridor. No respiratory distres
s.  no wheezes. no rales. exhibits no tenderness. 
 
 Abdominal: Soft. Bowel sounds are normal. exhibits no distension and no mass. There is no t
enderness. There is no rebound and no guarding. 
 
 Extremities/Musculoskeletal: Normal range of motion.exhibits no tenderness.  exhibits no ed
ema. Left leg bandage in place. 
   
 Neurological:  Alert and oriented to person, place, and time. Has normal reflexes.  display
s normal reflexes. No cranial nerve deficit.  Exhibits normal muscle tone. Coordination norm
al. Exhibits twitching 
 
 Skin: Skin is warm and dry. No rash noted. No erythema. No pallor. 
 
 Psychiatric: Has a normal mood and affect. Behavior is normal. Judgment normal. 
 
 LABS:
 WBC 
 Date Value Ref Range Status 
 2019 5.33 3.80 - 11.00 K/uL Final 
 2019 5.33 3.80 - 11.00 K/uL Final 
 
 RBC 
 Date Value Ref Range Status 
 2019 2.66 (L) 3.70 - 5.10 M/uL Final 
 2019 2.66 (L) 3.70 - 5.10 M/uL Final 
 
 HGB 
 Date Value Ref Range Status 
 2019 8.9 (L) 11.3 - 15.5 g/dL Final 
 2019 8.9 (L) 11.3 - 15.5 g/dL Final 
 
 HCT 
 Date Value Ref Range Status 
 2019 27.6 (L) 34.0 - 46.0 % Final 
 2019 27.6 (L) 34.0 - 46.0 % Final 
 
 MCV 
 Date Value Ref Range Status 
 2019 103.8 (H) 80.0 - 100.0 fl Final 
 2019 103.8 (H) 80.0 - 100.0 fl Final 
 
 
 MCH 
 Date Value Ref Range Status 
 2019 33.3 27.0 - 34.0 pg Final 
 2019 33.3 27.0 - 34.0 pg Final 
 
 MCHC 
 Date Value Ref Range Status 
 2019 32.1 32.0 - 35.5 g/dL Final 
 2019 32.1 32.0 - 35.5 g/dL Final 
 
 RDW SD 
 Date Value Ref Range Status 
 2019 61.3 (H) 37 - 53 fl Final 
 2019 61.3 (H) 37 - 53 fl Final 
 
 PLT 
 Date Value Ref Range Status 
 2019 151 150 - 400 K/uL Final 
 2019 151 150 - 400 K/uL Final 
 
 MPV 
 Date Value Ref Range Status 
 2019 9.3 fl Final 
 2019 9.3 fl Final 
 
 NEUTROPHILS ABS 
 Date Value Ref Range Status 
 2019 4.28 1.90 - 7.40 K/uL Final 
 
 LYMPHOCYTES ABS 
 Date Value Ref Range Status 
 2019 0.57 (L) 1.00 - 3.90 K/uL Final 
 
 EOSINOPHILS ABS 
 Date Value Ref Range Status 
 2019 0.00 0.00 - 0.50 K/uL Final 
 
 BASOPHILS ABS 
 Date Value Ref Range Status 
 2019 0.02 0.00 - 0.10 K/uL Final 
   Comment: 
   Testing performed at [facility], [address] [place] [address]  [id number] 
 
 Neutrophils Manual 
 Date Value Ref Range Status 
 2019 86 % Final 
 
 Lymphocytes Manual 
 Date Value Ref Range Status 
 2019 10 % Final 
 
 Monocytes Manual 
 Date Value Ref Range Status 
 2019 4 % Final 
 
 MORPHOLOGY 
 Date Value Ref Range Status 
 2019 1+  Final 
   Comment: 
   ANISO
 
 1+
 MACRO
 NORMAL PLT MORPH
 Testing performed at [facility], [address] [place] [address]  [id number]
  
 
 GLUCOSE 
 Date Value Ref Range Status 
 2019 166 (H) 65 - 99 mg/dL Final 
 
 BUN 
 Date Value Ref Range Status 
 2019 45 (H) 8 - 25 mg/dL Final 
 
 CREATININE 
 Date Value Ref Range Status 
 2019 8.1 (H) 0.50 - 1.00 mg/dL Final 
 
 BUN/CREAT 
 Date Value Ref Range Status 
 2019 6  Final 
 
 TOTAL PROTEIN 
 Date Value Ref Range Status 
 2019 6.0 (L) 6.3 - 8.2 g/dL Final 
 
 GLOBULIN 
 Date Value Ref Range Status 
 2019 3.7 1.3 - 4.9 g/dL Final 
 
 TBIL 
 Date Value Ref Range Status 
 2019 0.4 0.1 - 1.5 mg/dL Final 
 
 ALT 
 Date Value Ref Range Status 
 2019 16 10 - 65 U/L Final 
 
 AST 
 Date Value Ref Range Status 
 2019 21 10 - 45 U/L Final 
 
 SODIUM 
 Date Value Ref Range Status 
 2019 135 135 - 145 mmol/L Final 
 
 POTASSIUM 
 Date Value Ref Range Status 
 2019 4.8 3.5 - 4.9 mmol/L Final 
 
 CHLORIDE 
 Date Value Ref Range Status 
 2019 97 (L) 99 - 109 mmol/L Final 
 
 CO2 
 Date Value Ref Range Status 
 2019 25 23 - 32 mmol/L Final 
 
 ANION GAP AGAP 
 Date Value Ref Range Status 
 
 2019 18 5 - 20 mmol/L Final 
 
 [unfilled]
 HDL CHOL 
 Date Value Ref Range Status 
 2017 34 (L) >40 mg/dL Final 
 
 CHOLESTEROL 
 Date Value Ref Range Status 
 2017 101 <200 mg/dL Final 
 
 TSH 
 Date Value Ref Range Status 
 2017 1.14 0.45 - 5.10 uIU/mL Final 
   Comment: 
   Testing performed at [facility];[address] [place] [address];[place] [address] 
 
 TOTAL PROTEIN 
 Date Value Ref Range Status 
 2019 6.0 (L) 6.3 - 8.2 g/dL Final 
 
 TBIL 
 Date Value Ref Range Status 
 2019 0.4 0.1 - 1.5 mg/dL Final 
 
 BILI, DIRECT 
 Date Value Ref Range Status 
 2017 0.1 0.0 - 0.3 mg/dL Final 
 
 AST 
 Date Value Ref Range Status 
 2019 21 10 - 45 U/L Final 
 
 ALT 
 Date Value Ref Range Status 
 2019 16 10 - 65 U/L Final 
 
 Diagnostic:
 Xr Chest 2 View
 
 Result Date: 2018
 CHERIE VILLALOBOS 1950 68 years Female XR CHEST 2 VIEW FRONTAL AND LATERAL 2018
 5:27 PM INDICATION: Chest pain COMPARISON: 2018, CT chest 2017 TECHNIQUE: Two vie
w chest, PA and lateral views FINDINGS: The heart is stable in size. Patient is post median 
sternotomy. Right cardiac ICD in situ. Mild diffuse coarsening of lung markings. Suspect mil
d underlying interstitial edema. 1.6 cm perihilar opacity noted on the left. Blunting of the
 left costophrenic angle which may be secondary to effusion and/or scarring. Small right ple
ural effusion. Peripheral opacity along the lower lateral hemithorax, slightly increased que
stionable for artifact or loculated pleural fluid. Atelectasis or consolidation within the l
eft lung base, similar to the prior exam. 
 
 1.  Mild diffuse pulmonary edema, similar to the prior exam. 2.  Left perihilar airspace op
acity which may represent edema. Recommend repeat chest x-ray in 6-8 weeks to verify resolut
ion. 3.  Small left pleural effusion and/or pleural scarring, with adjacent atelectasis or c
onsolidation, similar to the prior exam. Small right pleural effusion. 4.  Peripheral opacit
y within the left lower hemithorax, slightly increased which is questionable for a loculated
 pleural fluid, parenchymal opacity, or artifact. Electronically signed by Chau Garcia on 1
2018 5:33 PM
 
 X-ray Foot Left
 
 
 Result Date: 2018
 CHERIE VILLALOBOS 1950 XR FOOT LEFT 2018 9:54 AM INDICATION: Osteomyelitis of 
the left foot. COMPARISON: 2018 TECHNIQUE: Left foot series, 3 views, PA, obliq
ue and lateral views 
 
 Amputation of the left forefoot at the level of the proximal metatarsals. Surgical cutaneou
s staples are present. No radiographic evidence for osteomyelitis. Normal alignment of the h
indfoot. Mild degeneration of the midfoot. Electronically signed by Oleksandr Lemus MD on 2018 10:12 AM
 
 Nm Myocardial Perfusion Spect (stress And Rest)
 
 Result Date: 2018
 HISTORY: Chest pain. Peripheral vascular disease. Multiple coronary stents. Multiple previo
us myocardial infarctions. CABG. TECHNIQUE: The patient was intravenously injected with 10.5
 millicuries technetium 99m sestamibi. Rest gated supine SPECT images were obtained.  The pa
tient was then intravenously injected with 0.4 mg Regadenason per protocol.    The patient w
as finally intravenously injected with 30.1 mCi technetium 99m sestamibi, and stress gated s
upine SPECT, and prone SPECT scintigraphic images were obtained. COMPARISON: Echocardiogram 
18. Patient was injected on 17 for a rest SPECT study, but no images were perfor
med. Coronary arteriography 18. FINDINGS: Moderate left ventricular dilatation, unchan
ged between rest and stress. Thinning of the proximal inferior wall radiating minimally into
 the lateral wall. This is unchanged between rest and stress supine images. Stress prone lanre
ges show resolution of this finding, favoring artifact. There is mild thinning of the mid an
terior wall slightly radiating into the septum, unchanged between rest and stress. I cannot 
exclude infarct. LVEF measures 29% stress, 33% rest. Moderate global hypokinesis. Rest EDV 1
70 mL. Rest  mL. Stress  mL. Stress  mL. Transient ischemic dilatation 
ratio 1.06, normal. 
 
 1.  Mild to moderate left ventricular dilatation, unchanged between rest and stress. 2.  I 
cannot exclude mid anterior wall infarct. 3.  I see no evidence of ischemia. 4.  Global mode
rate hypokinesis. 5.  LVEF 29% stress, 33% rest. Using the risk stratification system at our
 institution, this examination equates to at least a high risk based on this imaging, arisin
g from the criteria below (1). Note that this should be interfaced with clinical and other d
patrizia, potentially affecting final categorization. American Heart Association and American Col
lege of Cardiology Scientific Statement. Circulation (2008); 118: p 7851-1907 Electronically
 signed by Wilfrido Knight MD on 2018 4:34 PM
 
 Ir Angiogram Extremity Left
 
 Result Date: 2018
 LOWER EXTREMITY ARTERIOGRAM, UNILATERAL PREOPERATIVE DIAGNOSIS:  Critical limb ischemia and
 need for left forefoot amputation POSTOPERATIVE DIAGNOSIS:  Same PROCEDURE: 1. Moderate con
scious sedation 2. Ultrasound guided access of the right common femoral artery 3. Aortogram 
4. Selective left common femoral artery catheterization with left leg runoff 5. Left common 
iliac artery angioplasty using 6 x 40 mm angioplasty balloon 6. Drug eluting balloon angiopl
asty of left SFA using 4 x 100 mm Lutonix balloon SURGEON: Kirt Mclaughlin MD ASSISTANT: None ANEST
HESIA: Moderate sedation and local anesthesia Informed consent was obtained from the patient
. Continuous cardiac monitoring was performed throughout the procedure. Conscious sedation w
as provided by the nursing staff during the procedure under my supervision. Sedation time: 5
6 minutes Medications: 1 mg Versed IV, 100 mcg Fentanyl IV ESTIMATED BLOOD LOSS: Minimal CON
TRAST:  55 mL of Isovue 250 INDICATIONS:  See preoperative history and physical FINDINGS:  A
jabier with narrow aortic bifurcation. Moderate stenosis of proximal left common iliac artery 
making passing sheath difficult. Left SFA with moderate stenosis proximally. Single vessel r
unoff seen via left anterior tibial artery to foot. The posterior tibial artery does reconst
itute in left ankle via collaterals. Unable to pass sheath over aortic bifurcation due to il
iac disease and small arteries. After angioplasty, moderate angiographic results. Should be 
able to heal left forefoot amputation. If amputation does not heal, may need popliteal to po
sterior tibial artery bypass versus antegrade access of left common femoral artery with post
 
erior tibial artery recanalization. DESCRIPTION OF PROCEDURE:  The patient was properly iden
tified and brought to the Cath Lab. The patient was placed supine on the catheter table and 
patient was given moderate sedation by nursing staff under my supervision. Patient's bilater
al groins were then prepped and draped in the usual sterile fashion. Local anesthetic was gi
fidelia to the right groin and the right common femoral artery was accessed under ultrasound oksana
dance using a micropuncture needle. A micropuncture sheath was inserted over a wire and up s
ized to a 4 French sheath. A Omni flush catheter was then inserted into the distal aorta and
 aortogram was then performed with the findings as described above. The Omni Flush catheter 
was then used to guide a Glidewire into the left common femoral artery and then the catheter
 was then advanced over the wire. A left lower extremity runoff was then performed with the 
findings as described above. Next, an Amplatzer wire was then inserted over the catheter and
 the 4 French sheath was removed. A 6 French destination sheath was then inserted over the w
antione with the tip of the sheath being placed into the proximal left common iliac artery. The 
sheath would not pass through the diseased left common iliac artery. Angioplasty of the left
 common iliac artery was then performed using 6 x 40 mm angioplasty balloon. However, the sh
eath with still not pass after angioplasty. A vertebral catheter was inserted over the wire 
and the wire was then removed. A Glidewire was then placed into the vertebral catheter and u
sed to cross through the stenotic left superficial femoral artery.  Next, a 260 fix core wir
e was then inserted over the catheter.  Drug eluting balloon angioplasty of the left SFA was
 then performed using 4 x 100 mm Lutonix balloon. Unable to cross left posterior tibial britt
ry occlusion due to tip is sheath only being in left common iliac artery. A final arteriogra
m was then performed through the sheath. The destination sheath was then removed and a Mynx 
closure device was then used on the right common femoral artery and hemostasis was ensured. 
The patient was then taken to the observation unit for further monitoring. 
 
 1. Aorta with narrow aortic bifurcation with moderate stenosis of proximal left common errol
c artery. 2. Left SFA with moderate stenosis proximally. 3. Single vessel runoff to left jeanne
t via left anterior tibial artery. 4. Left posterior tibial artery and peroneal artery were 
occluded with reconstitution of distal posterior tibial artery at the left ankle via collate
rals. 5. If forefoot amputation does not heal, may need popliteal to posterior tibial artery
 bypass versus antegrade access of left common femoral artery with posterior tibial artery r
ecanalization. Electronically signed by Kirt Mclaughlin MD on 2018 10:51 AM
 
 Echo Cardiac Adult With Contrast
 
 Result Date: 2018
 Patient Name:  CHERIE VILLALOBOS YOB: 1950 Accession:  [id number] Performing Phys
ician:   Duglas Ornelas MD _______________________________________________________________ IND
ICATIONS ----------- SHORTNESS OF BREATH, DEFINITY WAS USED/SIGNIFICANT PATIENT REACTION FLA
NK PAIN CONCLUSIONS ----------- 1. Overall left ventricular systolic function is severely im
paired with, an EF between 20 - 25 %. 2. There is mild aortic regurgitation. 3. There is mil
d to  moderate aortic stenosis present. 4. Mild-to-moderate mitral regurgitation is present.
 5. There is moderate pulmonary hypertension. FINDINGS -------- Study: A 2-dimensional trans
thoracic echocardiogram with m-mode, spectral and color flow Doppler was perfomed. Study: Th
is was a technically adequate study. Left Ventricle: Overall left ventricular systolic funct
ion is severely impaired with, an EF between 20 - 25 %. Left Ventricle: The left ventricle c
avity size is normal. Left Ventricle: Left ventricular wall thickness is normal. Right Ventr
icle: The right ventricle is mildly enlarged measuring between 3.4 - 3.7 cm. Right Ventricle
: The right ventricular systolic function is at the low end of normal. Right Ventricle: Pace
r/ICD wire seen. Left Atrium: The left atrial size is normal Left Atrium: , and the LA measu
res 4.6cm. Right Atrium: The right atrium is normal in size. Right Atrium: Pacemaker wire se
en in the right atrial cavity. Aortic Valve: The aortic valve is trileaflet. Aortic Valve: T
he aortic valve is mildly calcified. Aortic Valve: There is mild aortic regurgitation. Aorti
c Valve: There is moderate aortic stenosis present. Mitral Valve: Mild-to-moderate mitral re
gurgitation is present. Mitral Valve: Mild mitral annular calcification present. Tricuspid V
alve: The tricuspid valve appears structurally normal. Tricuspid Valve: Mild tricuspid regur
gitation present. Tricuspid Valve: There is moderate pulmonary hypertension. Tricuspid Valve
: The right ventricular systolic pressure (pulmonary artery systolic pressure), as measured 
by Doppler, is 55.99mmHg. Pulmonic Valve: The pulmonic valve was not well visualized. Pulmon
ic Valve: Mild pulmonic regurgitation. Pericardium: There is no pericardial effusion. IVC/He
 
patic Veins: The IVC is normal size (1.5-2.5cm) and collapses >50% with sniff, consistent wi
 central venous pressures of 5-10mmHg. Contrast: Poor visualization. Definity was used to 
opacify the left ventricular chamber and improve delineation of the endocardial border. DIANE
UREMENTS ------------- CHEIKH Planimetry:   2.21 cm2 AVAI Planimetry:   0.00 cm2/m2 RA Area:   
13.84 cm2 IVC:   1.61 cm LA Major:   5.27 cm EDV(Teich):   121.12 ml IVSd:   0.82 cm LVIDd: 
  5.05 cm LVPWd:   0.88 cm LVOT Diam:   1.87 cm %FS:   9.90 % EF(Teich):   21.60 % ESV(Teich
):   94.95 ml IVSs:   0.96 cm LVIDs:   4.55 cm LVPWs:   1.05 cm SV(Teich):   26.17 ml RA Adan
or:   4.85 cm RVIDd:   3.62 cm LVEF MOD A2C:   23.68 % SV MOD A2C:   32.38 ml LVEF MOD A4C: 
  21.82 % SV MOD A4C:   27.33 ml EF Biplane:   22.47 % LVEDV MOD BP:   131.72 ml LVESV MOD B
P:   102.11 ml LVEDV MOD A2C:   136.73 ml LVLd A2C:   8.73 cm LVEDV MOD A4C:   125.25 ml LVL
d A4C:   8.87 cm LVESV MOD A2C:   104.34 ml LVLs A2C:   8.34 cm LVESV MOD A4C:   97.91 ml LV
Ls A4C:   8.60 cm LAESV(A-L):   48.15 ml LAESV Index (A-L):   26.17 ml/m2 LAAs A2C:   16.24 
cm2 LAESV A-L A2C:   47.34 ml LALs A2C:   4.73 cm LAAs A4C:   16.52 cm2 LAESV A-L A4C:   47.
99 ml LALs A4C:   4.83 cm Ao Diam:   2.59 cm AV Cusp:   1.65 cm LA Diam:   4.60 cm LA/Ao:   
1.77 TAPSE:   1.57 cm IVC diameter:   1.61 cm IVC collapse:   0.68 cm IVC % collapse:   56.1
6 % AR Dec Clermont:   3.95 m/s2 AR Dec Time:   955.07 ms AR maxP.06 mmHg AR PHT:   276.
97 ms AR Vmax:   3.77 m/s HR:   68.91 BPM AV maxP.56 mmHg AV meanPG:   10.23 mmHg AV 
Vmax:   2.21 m/s AV Vmean:   1.53 m/s AV VTI:   47.77 cm CHEIKH Vmax:   1.03 cm2 CHEIKH (VTI):   0
.99 cm2 AVAI Vmax:   0.00 cm2/m2 AVAI (VTI):   0.00 cm2/m2 LVCI Dopp:   1.80 l/minm2 LVCO Do
pp:   3.32 l/min HR:   70.30 BPM LVOT maxP.71 mmHg LVOT meanP.55 mmHg LVSI Dopp:
   25.70 ml/m2 LVSV Dopp:   47.30 ml LVOT Vmax:   0.82 m/s LVOT Vmean:   0.58 m/s LVOT VTI: 
  17.07 cm MCO:   459.56 ms MV A Hiram:   0.83 m/s MV DecT:   232.85 ms MV E Hiram:   1.70 m/s M
V E/A Ratio:   2.05 MV PHT:   80.47 ms MVA By PHT:   2.73 cm2 MV A Dur:   82.77 ms MV maxP.14 mmHg MV meanP.79 mmHg MV Vmax:   1.74 m/s MV Vmean:   1.01 m/s MV VTI:   45.3
3 cm MVA (VTI):   1.04 cm2 Septal e':   0.04 m/s Septal E/e':   38.01 Lateral e':   0.08 m/s
 Lateral E/e':   21.28 P Vein D:   0.84 m/s P Vein S/D Ratio:   0.56 P Vein S:   0.47 m/s PA
EDP:   8.38 mmHg PRend P.38 mmHg PRend Vmax:   1.16 m/s HR:   69.37 BPM PV maxP.
87 mmHg PV meanP.11 mmHg PV Vmax:   0.68 m/s PV Vmean:   0.50 m/s PV VTI:   15.15 cm R
AP:   3 mmHg RV S':   0.08 m/s RVSP:   55.99 mmHg TR maxP.99 mmHg TR Vmax:   3.63 m/s
 TV A Hiram:   0.44 m/s TV Dec Clermont:   6.11 m/s2 TV Dec Time:   133.74 ms TV E Hiram:   0.81 m/
s TV E/A Ratio:   1.81 Sonographer: CEE Authenticated by: Duglas Ornelas MD Report Date/Time: 
2018 17:39:35 
 
 1. Overall left ventricular systolic function is severely impaired with, an EF between 20 -
 25 %. 2. There is mild aortic regurgitation. 3. There is mild to  moderate aortic stenosis 
present. 4. Mild-to-moderate mitral regurgitation is present. 5. There is moderate pulmonary
 hypertension.
 
 Ir Guidance Vascular Access Us
 
 Result Date: 2018
 This Point of Care (POC) ultrasound image has been reviewed and interpreted by the physicia
n identified as the performing physician in the associated interpretation and report. 
 
 PROBLEM LIST
 Principal Problem:
   Osteomyelitis of left foot (HCC)
 Active Problems:
   Secondary hyperparathyroidism (HCC)
   Depression
   ESRD (end stage renal disease) (HCC)
   Type 2 diabetes mellitus with chronic kidney disease on chronic dialysis, with long-term 
current use of insulin (HCC)
   Anemia in ESRD (end-stage renal disease) (HCC)
   Hypertension, essential
   Anemia of chronic renal failure, stage 5 (HCC)
   Diabetic foot ulcer (HCC)
   Chronic systolic congestive heart failure (HCC)
   Chronic anticoagulation
   Cardiomyopathy (HCC)
 
   Paroxysmal atrial fibrillation (HCC)
   S/P CABG x 2
   S/P mitral valve repair
   PVD (peripheral vascular disease) (HCC)
   CAD (coronary artery disease)
 
 ASSESSMENT & PLAN
 
 Osteomyelitis of left foot SP left TMA by Dr. Jordan on 18. Bone culture grew normal
 skin marzena. However bone pathology is pending. Patient is receiving cefepime with each HD p
er nephrology and ID recommendations. 
 - Dr. Jordan is following patient in the hospital. 
 - ID consult from Dr. Elliott appreciated. Will follow recommendations regarding use of future
 antibiotic.  
 - Continue vanc and ceftazidime 
 
 Phantom pain.Resolved.
 
 Twitching with AMS - CT head negative. Possibly due to ESRD, patient Creatinine elevated  -
 to get dialysis 
 
 ESRD. On HD every T-T-S. Last HD yesterday. Appreciate help from nephrology services. 
 
 Type II DM with complications. Most recent blood sugars have been in acceptable range . She
 is on Lantus and SSI. Last glycohemoglobin A1c was 7.5 on 18. Will continue current r
egimen of Lantus and Humalog. 
 
 CAD SP CABG, paroxysmal atrial fibrillation, HFrEF. All cardiac problems are under control.
 Will continue apixaban, carvedilol, aspirin, Plavix, losartan and Imdur. 
 
 History of PAD. SP left leg angioplasty. On Plavix and aspirin. 
 
 Anemia of chronic kidney disease. Management per nephrology services. 
 
 Disposition: SN[facility] george Vincent DO
 2019 12:28 PM
 
 Addendum:
 
 During dialysis patient started to develop left arm pain. Patient denies any SOB, n/v or ov
ert chest pain, however she says she feels Off. Stat troponin was obtained and found to be p
ositive at 0.318. EKG shows LBB, similar to prior. Patient was given nitroglycerin after whi
ch pain abated. ON evaluation patient was AAOx3 NAD,. Chest pain free. CXR showed small locu
lated pleural fluid collection seen overlying lateral left heart boarded in LL chest with sm
all pleural effusion.
 
 Assessment
 Chest pain, looks to be cardiac in nature
 
 Plan
 - ASA now
 - Nitro PRN
 - Discussed Case with Dr. Luz, she agrees to see patient. Appreciate recommendations. 
 - Continue telemetry
 - Monitor troponin Q6h
 - Andrés Ying MD - 2019  9:39 AM PSTFormatting of this note may be different from
 the original.
 
 
 
 CONSULT NOTE
 2019
 9:39 AM
 
 PRIMARY PHYSICIAN
 Ivy Couch
 
 CONSULT REQUEST FROM
 Winston Vincent DO
 
 HISTORY OF PRESENT ILLNESS
 The patient is a 68 y.o.-year-old female  with history of ESRD on HD via fistula Tue/Thu/Sa
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
 CABG. Recent LLE angioplasty few weeks ago. She had been on  antibiotics for L foot first d
igit infection with possible osteomyelitis since November (Oral levofloxacin and clindamycin
 then transitioned to IV cefepime with HD until 18, no improvement noted on antibiotics
). She had followed with podiatrist and underwent L TMA on 18, intraoperative findings
 encountered purulence at first metatarsal. 
 Admitted on 18 to [facility]. 
 Wound culture shows skin marzena.
 DAy 5 of iv vancomycin and  Day 1 of ceftazidime
 Had 4 days of cefepime 
 Will change to iv vancmycin 500 mg iv and ceftazidime 2 gm iv after each hemodialysis unitl
until 18 for the infected foot.  
 The patient has problem with tremors on the hands  And on and off confusion. 
 Ct head negative on 18
 Will have EEG done. 
 Past Medical History 
 Diagnosis Date 
   Acute MI (HCC)  
  with stenting x 1 
   Anemia associated with chronic renal failure 2016 
   Atrial fibrillation (HCC)  
   Chronic kidney disease  
   Congestive heart failure (HCC)  
   COPD (chronic obstructive pulmonary disease) (HCC)  
   COPD (chronic obstructive pulmonary disease) (formerly [facility]) 2018 
   Coronary artery disease  
  stent placements: 3 total 
   Depression  
   Diabetes other 
  abstracted 
   Diabetes mellitus, type 2 (formerly [facility])  
   ESRD on peritoneal dialysis (formerly [facility])  
   GERD (gastroesophageal reflux disease)  
   Hemodialysis patient (formerly [facility]) 10/2016 
  Stopped peritoneal and restarted hemodialysis 
   Hemorrhage of gastrointestinal tract, unspecified 2017 
  low GI, rectal bleeding 
   High blood pressure other 
  abstracted 
   High cholesterol other 
  abstracted 
   Hyperlipidemia  
   Hypertension  
   Hyponatremia 2017 
   Myocardial infarction (formerly [facility]) 2017 
   Other chronic pain  
  feet,  
 
   Pain of left hip joint 2016 
  due to hip fracture and replacement 
   Partial small bowel obstruction (formerly [facility]) 2017 
  resolved 
   Renal failure  
  Stage 5.   Fistula in the JAN - not on dialysis yet. 
   Unspecified visual disturbance  
  glasses 
 
 Past Surgical History 
 Procedure Laterality Date 
   AV FISTULA PLACEMENT   
  left upper arm AV fistula - failed 
   AV FISTULA REPAIR N/A 10/25/2016 
  Procedure: AV FISTULA - GRAFT REPAIR/REVISION;  Surgeon: Kirt Mclaughlin MD;  Location: [facility]
IN OR;  Service: Vascular;  Laterality: N/A;  Superficialization and revision of left arm fi
stula 
   CARDIAC CATHETERIZATION  2017 
   CARPAL TUNNEL RELEASE Bilateral other 
  abstracted 
   CATARACT EXTRACTION Bilateral 2007 
   CATHETER REMOVAL N/A 2016 
  Procedure: DIALYSIS CATHETER - REMOVAL;  Surgeon: Kirt Mclaughlin MD;  Location: [facility] MAIN OR; 
 Service: Vascular;  Laterality: N/A;  PD cath removal 
   CHOLECYSTECTOMY  other 
  abstracted 
   COLONOSCOPY   
   CORONARY ARTERY BYPASS GRAFT   
   CORONARY STENT PLACEMENT  2017 
  Drug Elutin stents to OM, 1 stent to prox. LAD 
   EYE SURGERY   
   FOOT AMPUTATION Left 2018 
  Procedure: FOREFOOT - AMPUTATION;  Surgeon: Srinivasan Jordan DPM;  Location: [facility] MAIN OR;
  Service: Podiatry;  Laterality: Left; 
   HARDWARE PRESENT   
  stent placements x 3, Left hip replacement, vascular stents 2 in left leg 
   HIP ARTHROPLASTY Left 2016 
  Procedure: HIP - ARTHROPLASTY(ALLISON);  Surgeon: Uriel Roa MD;  Location: [facility] MAIN 
OR;  Service: Orthopedics;  Laterality: Left; 
   HYSTERECTOMY  1998 
  complete 
   PACEMAKER INSERTION   
  [employer] pacemaker/defib 
   PERITONEAL CATHETER INSERTION  8/15/2013 
   PERITONEAL CATHETER INSERTION N/A 2016 
  Procedure: LAPAROSCOPIC - PERITONEAL DIALYSIS CATH INSERTION;  Surgeon: Kirt Mclaughlin MD;  Lo
cation: [facility] MAIN OR;  Service: Vascular;  Laterality: N/A;  Removal of old catheter 
   SHOULDER SURGERY Right  
  frozen shoulder 
   UNLISTED PROCEDURE ARTHROSCOPY   
  total Left hip 
   vascular stents Left 2017 
  leg (2 stents) 
   VASCULAR SURGERY   
 
 Current Facility-Administered Medications 
 Medication Dose Route Frequency Provider Last Rate Last Dose 
   acetaminophen (TYLENOL) tablet 650 mg  650 mg Oral Q6H PRN Srinivasan Jordan DPM     
  Or 
   acetaminophen (TYLENOL) suppository 650 mg  650 mg Rectal Q6H PRN Srinivasan Jordan DPM 
 
    
   albumin human 25 % solution 12.5 g  12.5 g Intravenous Q1H PRN Michelle Awad MD   12.5
 g at 18 1041 
   albuterol (PROVENTIL HFA;VENTOLIN HFA) inhaler  2 puff Inhalation Q4H PRN Srinivasan Dhillon
an, DPM     
   apixaban (ELIQUIS) tablet 2.5 mg  2.5 mg Oral BID GELACIO TerryM   2.5 mg at  0824 
   aspirin EC tablet 81 mg  81 mg Oral Daily with breakfast Srinivasan Jordan DPM   81 mg a
t 19 0824 
   atorvastatin (LIPITOR) tablet 20 mg  20 mg Oral Nightly Srinivasan Jordan DPM   20 mg at
 18 
   calcium acetate (PHOSLO) capsule 667 mg  667 mg Oral TID  Srinivasan Jordan DPM   667 
mg at 19 0825 
   carvedilol (COREG) tablet 3.125 mg  3.125 mg Oral BID  Srinivasan Jordan DPM   3.125 m
g at 19 0825 
   cefTAZidime (FORTAZ) 2 g in sodium chloride (IV) 0.9 % 50 mL IVPB  2 g Intravenous Afte
r Hemodialysis (see admin instructions) Andrés Elliott MD     
   clopidogrel (PLAVIX) tablet 75 mg  75 mg Oral Daily Srinivasan Jordan DPM   75 mg at  0824 
   dextrose 10 % infusion   Intravenous Continuous PRN Srinivasan Joradn DPM     
   dextrose 50 % solution 12 mL  12 mL Intravenous PRN Srinivasan Jordan DPM     
   dextrose 50 % solution 25 mL  25 mL Intravenous PRN Srinivasan APONTE Julian, DPM     
   furosemide (LASIX) tablet 20 mg  20 mg Oral Daily Srinivasan L Julian, DPM   20 mg at  0824 
   glucagon (GLUCAGEN) injection 0.5 mg  0.5 mg Intramuscular PRN Srinivasan L Julian, DPM    
 
   glucagon (GLUCAGEN) injection 1 mg  1 mg Intramuscular PRN Srinivasan L Julian, DPM     
   HYDROcodone-acetaminophen (NORCO) 5-325 MG per tablet 1 tablet  1 tablet Oral Q4H PRN B
axel L Julian, DPM   1 tablet at 184 
  Or 
   HYDROcodone-acetaminophen (NORCO)  MG per tablet 1 tablet  1 tablet Oral Q4H PRN 
Srinivasanmario Jordan, DPM   1 tablet at 18 1446 
   insulin glargine (LANTUS) injection 20 Units  20 Units Subcutaneous Nightly Augustin Bourne MD   20 Units at 18 
   insulin lispro (human) (HUMALOG) injection 0-10 Units  0-10 Units Subcutaneous TID AC B
axel Jordan, DPM   2 Units at 19 0625 
   insulin lispro (human) (HUMALOG) injection 0-5 Units  0-5 Units Subcutaneous Nightly Br
marioeloise Jordan, DPM   1 Units at 18 
   isosorbide mononitrate (IMDUR) 24 hr tablet 60 mg  60 mg Oral Daily Srinivasanmario Jordan, DP
M   60 mg at 19 0824 
   loperamide (IMODIUM) capsule 2 mg  2 mg Oral PRN Srinivasan Jordan, DPM     
   LORazepam (ATIVAN) tablet 1 mg  1 mg Oral Daily PRN Srinivasan L Julian, DPM   1 mg at 12/3
1/18 1625 
   losartan (COZAAR) tablet 100 mg  100 mg Oral Daily Srinivasanmario Jordan, DPM   100 mg at  0824 
   nitroGLYCERIN (NITROSTAT) SL tablet 0.4 mg  0.4 mg Sublingual Q5 Min PRN Srinivasan L Favian navas, DPM   0.4 mg at 18 
   nortriptyline (PAMELOR) capsule 25 mg  25 mg Oral Daily Srinivasanmario Jordan, DPM   25 mg at
 19 0823 
   nortriptyline (PAMELOR) capsule 75 mg  75 mg Oral Nightly João Asher MD   75 mg at 1
2112 
   ondansetron (ZOFRAN-ODT) disintegrating tablet 4 mg  4 mg Oral Q6H PRN Srinivasan Jordan,
 DPM   4 mg at 18 0902 
  Or 
   ondansetron (ZOFRAN) injection 4 mg  4 mg Intravenous Q6H PRN Srinivasan Jordan, DPM     
   oxybutynin (DITROPAN) tablet 2.5 mg  2.5 mg Oral BID Srinivasan Jordan DPM   2.5 mg at 0
19 0823 
   pantoprazole (PROTONIX) EC tablet 40 mg  40 mg Oral QAM AC Srinivasan Jordan DPM   40 mg
 at 19 0625 
   polyethylene glycol (GLYCOLAX) packet 17 g  17 g Oral Daily PRN Srinivasan Jordan, DPM   
 
  
   prochlorperazine tablet 5 mg  5 mg Oral BID PRN Srinivasan Jordan, DPM     
   rOPINIRole (REQUIP) tablet 0.75 mg  0.75 mg Oral Nightly Srinivasan Jordan DPM   0.75 mg
 at 18 
   sodium chloride (PF) 0.9 % flush 10 mL  10 mL Intravenous Q8H PRN Srinivasan Jordan DPM 
  10 mL at 18 0747 
   vancomycin (VANCOCIN) 750 mg/250 mL IVPB  750 mg Intravenous See Admin Instructions Rene
ah H Frost, RPH     
 
 Allergies 
 Allergen Reactions 
   Quinapril Hcl Anaphylaxis 
   Digoxin And Related Other (See Comments) 
   toxic 
   Metaxalone Other (See Comments) 
   Morphine Mental Changes 
   Make her crazy/ "funny feeling in my head"
 Has used hydromorphone in-house 
   Pantoprazole Sodium Nausea and Vomiting 
   Penicillins Rash 
   Quinapril Hcl Edema 
   Facial Edema 
   Sudafed [Pseudoephedrine Hcl] Rash 
 
 Social History 
 
 Social History 
   Marital status:  
   Spouse name: N/A 
   Number of children: N/A 
   Years of education: N/A 
 
 Occupational History 
   Not on file. 
 
 Social History Main Topics 
   Smoking status: Former Smoker 
   Packs/day: 1.00 
   Years: 30.00 
   Types: Cigarettes 
   Quit date: 2004 
   Smokeless tobacco: Never Used 
    Comment: quite smoking  
   Alcohol use No 
   Drug use: No 
   Sexual activity: No 
 
 Other Topics Concern 
   Not on file 
 
 Social History Narrative 
  She lives with . 2 kids. Worked in banking 
 
 No smoking. No alcohol intake. Recreational Drug Use
 NO Travel
 mp Pets
 Immunization History 
 Administered Date(s) Administered 
   Hepatitis B Adult 2016 
   Influenza, Trivalent W/Preservative 2016 
 
   Pneumococcal Polysaccharide 23-valent 2012 
  
 
 Pneumococcal
 Influenza
 
 Family History 
 Problem Relation Age of Onset 
   High cholesterol Father  
   Hypertension Father  
   Diabetes Paternal Grandmother  
   Malig hypertherm Neg Hx  
   Kidney disease Neg Hx  
 
 REVIEW OF SYSTEMS
 
 General: The patient noted to have no  problem of fever,no  weight loss
  and no chills.
 
 Neurologic: Denies any headache, any lightheadedness. No loss of
 
 consciousness or seizure.
 
 Cardiac: with  No Chest Pain,with no  Palpitation,no  Dyspnea on Exertion
 
 Pulmonary: Denies cough, wheezing and hemoptysis
 
 GASTROINTESTINAL: Denies nausea vomiting, and diarrhea.
 GENITOURINARY: Noted no dysuria, urgency, or frequency.
 Hematological : Denies any ecchymosis, bleeding or hematuria
 
 Endocrine: Denies any polyphagia, polyuria or hot and cold intolerance
  Musculoskeletal: no new joint pain and swelling. 
 Skin : Denies any new rashes or cutaneous changes.
 
 Rest of the review of systems is unremarkable.
 
 PHYSICAL EXAMINATION
 
 VITAL SIGNS 
 Wt Readings from Last 3 Encounters: 
 18 68.7 kg (151 lb 7.3 oz) 
 18 65.8 kg (145 lb 1 oz) 
 18 65.9 kg (145 lb 4.5 oz) 
 
 Temp Readings from Last 3 Encounters: 
 19 97.8 F (36.6 C) (Oral) 
 18 98.2 F (36.8 C) (Oral) 
 18 97.9 F (36.6 C) (Oral) 
 
 BP Readings from Last 3 Encounters: 
 19 138/61 
 18 139/63 
 18 111/61 
 
 Pulse Readings from Last 3 Encounters: 
 19 87 
 18 74 
 18 85 
 
 
 Head:  Normocephalic atraumatic
 
 HEENT: Pink palpebral conjunctivae. Anicteric sclerae. No
 tonsillopharyngeal congestion.
 
 Neck : Noted no  Cervical Lymphadenopathy
 
 CHEST:Clear Breath Sounds Bilateral . 
 
 HEART: Regular rate and rhythm. No murmurs thrills or rubs
 
 ABDOMEN: Soft, flat. No tenderness. No hepatosplenomegaly.
 
 GROIN AREA: Negative  for intertrigo.
 
 EXTREMITIES: upper extremity no lesion s.
 Right lower extremities no edema. Left foot with dressing.
  
 
 NEUROLOGIC: No localizing signs.
 
 Skin : NO rash or ecchymosis. 
 
 LABORATORY DATA
 
 Lab Results 
 Component Value Date 
  WBC 5.33 2019 
  WBC 5.33 2019 
  HGB 8.9 (L) 2019 
  HGB 8.9 (L) 2019 
  HCT 27.6 (L) 2019 
  HCT 27.6 (L) 2019 
   2019 
   2019 
  CHOL 101 2017 
  TRIG 132 2017 
  HDL 34 (L) 2017 
  ALT 16 2019 
  AST 21 2019 
   2019 
  K 4.8 2019 
  CL 97 (L) 2019 
  CREATININE 8.1 (H) 2019 
  BUN 45 (H) 2019 
  CO2 25 2019 
  TSH 1.14 2017 
  INR 1.0 2018 
  GLUF 166 (H) 2019 
  HGBA1C 7.5 (H) 2018 
 
 Hepatic Function Panel:  
 Lab Results 
 Component Value Date 
  PROT 6.0 (L) 2019 
  ALB 2.3 (L) 2019 
  BILITOT 0.4 2019 
  BILIDIR 0.1 2017 
  ALP 76 2019 
  AST 21 2019 
 
  ALT 16 2019 
  
 
 Lipase: 
 Lab Results 
 Component Value Date 
  LIPASE 332 2017 
  
 U/A:  
 Lab Results 
 Component Value Date 
  COLORU YELLOW 2011 
  CLARITYU Slightly Cloudy 10/16/2018 
  MEROPENEM 1.009 2013 
  LEUKOCYTESUR  10/16/2018 
    Comment: 
    small 
  NITRITE Negative 10/16/2018 
  UROBILINOGEN Normal 10/16/2018 
  UPRO  10/16/2018 
    Comment: 
    100 
  UPRO 100 2013 
  PHUR 8 10/16/2018 
  BLOODU Negative 10/16/2018 
  KETONES negative 10/16/2018 
  BILIRUBINUR Negative 10/16/2018 
  GLUCOSEU  10/16/2018 
    Comment: 
    moderate 
  
 DIAGNOSTIC IMAGING
 
 CAT scan 
 
 CT head without contrast [[id number]] Collected: 18 1718 
 Order Status: Completed Updated: 18 1733 
 Narrative:  
 CHERIE VILLALOBOS
 1950
 68 years Female
 CT HEAD WO CONTRAST
 2018 5:23 PM
 
 INDICATION: Altered mental status in a patient with acute osteomyelitis
 
 COMPARISON: Head CT, 2017
 
 TECHNIQUE: CT scan of the head without contrast. 5-mm axial noncontrast images were acqui
red from the foramen magnum through the cranial vertex. Multiplanar reformations were obtain
ed from the acquisition data.
 
 At least one of the following CT dose optimization techniques were used: Automated exposure
 control; Adjustment of mA and/or kV according to patient size; Use of iterative reconstruct
ion technique.
 
 FINDINGS:
 
 Brain: No intracranial hemorrhage, midline shift or pathologic mass effect. No cerebral kt
ma, mass lesion or evidence of acute infarct.
 
 
 Ventricles and extra-axial fluid spaces: Normal.
 
 Paranasal sinuses and mastoid air cells: Normal.
 
 Calvarium and extracranial soft tissues: Normal.
 
 Orbits: The patient is status post bilateral cataract surgery. 
 Impression:  
 1. No acute intracranial pathology. 
 
 Xr Chest 2 View
 
 Result Date: 2018
 1.  Mild diffuse pulmonary edema, similar to the prior exam. 2.  Left perihilar airspace op
acity which may represent edema. Recommend repeat chest x-ray in 6-8 weeks to verify resolut
ion. 3.  Small left pleural effusion and/or pleural scarring, with adjacent atelectasis or c
onsolidation, similar to the prior exam. Small right pleural effusion. 4.  Peripheral opacit
y within the left lower hemithorax, slightly increased which is questionable for a loculated
 pleural fluid, parenchymal opacity, or artifact. Electronically signed by Chau Garcia on 1
2018 5:33 PM
 
 X-ray Foot Left
 
 Result Date: 2018
 Amputation of the left forefoot at the level of the proximal metatarsals. Surgical cutaneou
s staples are present. No radiographic evidence for osteomyelitis. Normal alignment of the h
indfoot. Mild degeneration of the midfoot. Electronically signed by Oleksandr Lemus MD on 2018 10:12 AM
 
 Ct Head Without Contrast
 
 Result Date: 2018
 1.  No acute intracranial pathology. Electronically signed by Steven Samano MD on  5:28 PM
 
 Nm Myocardial Perfusion Spect (stress And Rest)
 
 Result Date: 2018
 1.  Mild to moderate left ventricular dilatation, unchanged between rest and stress. 2.  I 
cannot exclude mid anterior wall infarct. 3.  I see no evidence of ischemia. 4.  Global mode
rate hypokinesis. 5.  LVEF 29% stress, 33% rest. Using the risk stratification system at our
 institution, this examination equates to at least a high risk based on this imaging, bri cm from the criteria below (1). Note that this should be interfaced with clinical and other d
patrizia, potentially affecting final categorization. American Heart Association and American Col
lege of Cardiology Scientific Statement. Circulation (2008); 118: p 6161-2141 Electronically
 signed by Wilfrido Knight MD on 2018 4:34 PM
 
 Ir Angiogram Extremity Left
 
 Result Date: 2018
 1. Aorta with narrow aortic bifurcation with moderate stenosis of proximal left common errol
c artery. 2. Left SFA with moderate stenosis proximally. 3. Single vessel runoff to left jeanne
t via left anterior tibial artery. 4. Left posterior tibial artery and peroneal artery were 
occluded with reconstitution of distal posterior tibial artery at the left ankle via collate
rals. 5. If forefoot amputation does not heal, may need popliteal to posterior tibial artery
 bypass versus antegrade access of left common femoral artery with posterior tibial artery r
ecanalization. Electronically signed by Kirt Mclaughlin MD on 2018 10:51 AM
 
 Cherie Villalobos is a 68 y.o. female with the following Problems 
 
 Patient Active Problem List 
 Diagnosis 
   Proteinuria 
   Vitamin D deficiency 
   Secondary hyperparathyroidism (HCC) 
   Recurrent UTI 
   Coronary artery disease involving native coronary artery of native heart without angina
 pectoris 
   Depression 
   ESRD (end stage renal disease) (formerly [facility]) 
   Type 2 diabetes mellitus with chronic kidney disease on chronic dialysis, with long-ter
m current use of insulin (HCC) 
   Anemia in ESRD (end-stage renal disease) (formerly [facility]) 
   Hypertension, essential 
   Anemia of chronic renal failure, stage 5 (HCC) 
   Dyslipidemia 
   Gastroesophageal reflux disease without esophagitis 
   Diabetic foot ulcer (HCC) 
   Loss of weight 
   Dysphagia, unspecified 
   Foot ulcer due to DM  (formerly [facility]) 
   Severe protein-calorie malnutrition (HCC) 
   Osteomyelitis of left foot (formerly [facility]) 
   Pleural effusion 
   Prolonged Q-T interval on ECG 
   Chronic systolic congestive heart failure (HCC) 
   Chronic diarrhea 
   Chronic anticoagulation 
   Plantar ulcer (HCC) 
   Cardiomyopathy (HCC) 
   COPD (chronic obstructive pulmonary disease) (HCC) 
   ICD (implantable cardioverter-defibrillator) in place 
   Implantable defibrillator reprogramming/check 
   Mixed hyperlipidemia 
   Moderate protein-calorie malnutrition (HCC) 
   Paroxysmal atrial fibrillation (HCC) 
   S/P CABG x 2 
   S/P mitral valve repair 
   Steroid-induced hyperglycemia 
   Stress hyperglycemia 
   Chronic multifocal osteomyelitis of left foot (HCC) 
   PVD (peripheral vascular disease) (HCC) 
   CAD (coronary artery disease) 
 
 Plan:
 
 CBC CMP ESR CRP 
 q Monday 
 Vancomycin 500 mg iv and ceftazidime 2 gm after each hemodialysis
 EEG today 
 RECOMMENDATIONS
 Start discharge planning for iv vancomycin and cefrazidime for 6 weeks unitil 19 
 
 Discussed with Dr. Vincent hospitalist  who agreed and will proceed
 As plan.
 Thank you very much for the consult.
 ______________________
 
 ______________________
 MD Melisa FRANKLIN, Winston, DO - 2018  3:27 PM PSTFormatting of this note may be different from the 
original.
 [facility]
 Service:  Hospitalist
 Progress Note
 
 Pt: Cherie Villalobos  AGE/SEX: 68 y.o.   female      : 1950      
 MRN: [id number]          ROOM: [id number]/428-1   
 TODAY'S DATE: 2018
 
 Hospital Day:   LOS: 3 days 
 SUBJECTIVE
 Patient evaluated at bedside. Patient denies significant foot pain in the left foot. No new
 complaints otherwsie. No fevers, chills, n/v, abd.pain, CP. 
 
 Scheduled Medications   apixaban  2.5 mg Oral BID 
   aspirin  81 mg Oral Daily with breakfast 
   atorvastatin  20 mg Oral Nightly 
   calcium acetate  667 mg Oral TID WC 
   carvedilol  3.125 mg Oral BID WC 
   cefepime  2 g Intravenous Q48H 
   clopidogrel  75 mg Oral Daily 
   furosemide  20 mg Oral Daily 
   insulin glargine  20 Units Subcutaneous Nightly 
   insulin lispro (human)  0-10 Units Subcutaneous TID AC 
   insulin lispro (human)  0-5 Units Subcutaneous Nightly 
   isosorbide mononitrate  60 mg Oral Daily 
   losartan  100 mg Oral Daily 
   nortriptyline  25 mg Oral Daily 
   nortriptyline  75 mg Oral Nightly 
   oxybutynin  2.5 mg Oral BID 
   pantoprazole  40 mg Oral QAM AC 
   rOPINIRole  0.75 mg Oral Nightly 
   vancomycin  750 mg Intravenous See Admin Instructions 
 
 Continuous Infusions 
   dextrose   
 
 PRN Medications
 acetaminophen **OR** acetaminophen, albumin human, albuterol, dextrose, dextrose, dextrose,
 glucagon, glucagon, HYDROcodone-acetaminophen **OR** HYDROcodone-acetaminophen, loperamide,
 LORazepam, nitroGLYCERIN, ondansetron **OR** ondansetron, polyethylene glycol, prochlorpera
zine, saline lock IV - when tolerating PO fluids **AND** sodium chloride (PF)
 
 Allergy:
 Allergies 
 Allergen Reactions 
   Quinapril Hcl Anaphylaxis 
   Digoxin And Related Other (See Comments) 
   toxic 
   Metaxalone Other (See Comments) 
   Morphine Mental Changes 
   Make her crazy/ "funny feeling in my head"
 Has used hydromorphone in-house 
   Pantoprazole Sodium Nausea and Vomiting 
   Penicillins Rash 
   Quinapril Hcl Edema 
   Facial Edema 
   Sudafed [Pseudoephedrine Hcl] Rash 
 
 
 OBJECTIVE
 Vitals:
 Patient Vitals for the past 24 hrs:
  BP Temp Temp src Pulse Resp SpO2 
 18 1114 138/59 98 F (36.7 C) Oral 91 16 98 % 
 18 0705 122/57 98.6 F (37 C) Oral 77 16 97 % 
 18 0338 113/53 98.4 F (36.9 C) Oral 72 16 97 % 
 18 0015 108/53 - - 70 - - 
 18 2348 (!) 85/42 - - - - - 
 18 2342 (!) 83/39 98.1 F (36.7 C) Oral 71 16 96 % 
 18 1938 118/56 98.6 F (37 C) Oral 79 18 - 
 18 1610 91/52 98.5 F (36.9 C) Oral 69 16 97 % 
 
 I&O Detailed Table: 
 
 Intake/Output Summary (Last 24 hours) at 18 1527
 Last data filed at 18 1225
  Gross per 24 hour 
 Intake              700 ml 
 Output              180 ml 
 Net              520 ml 
 
 Patient Vitals for the past 96 hrs:
  Weight 
 18 1549 68.7 kg (151 lb 7.3 oz) 
 18 68.7 kg (151 lb 7.3 oz) 
 
 Hemodynamics Last 24hrs:   
 
 Examination:
 
 Constitutional: Oriented to person, place, and time. appears well-developed and well-nouris
hed. 
 
 HEENT: 
 Head: Normocephalic and atraumatic. 
 Nose: Nose normal. 
 Mouth/Throat: Oropharynx is clear and moist.
 Eyes: Conjunctivae and EOM are normal. Pupils are equal, round, and reactive to light. Righ
t eye exhibits no discharge. Left eye exhibits no discharge. No scleral icterus. 
 Neck: Normal range of motion. Neck supple. No JVD present. No tracheal deviation present. N
o thyromegaly present. no cervical adenopathy.
 
 Cardiovascular: Normal rate, regular rhythm, normal heart sounds with S1 and S2, and intact
 distal pulses.  Exam reveals no gallop and no friction rub.  No murmur heard.
 
 Pulmonary/Chest: Effort normal and breath sounds normal. No stridor. No respiratory distres
s.  no wheezes. no rales. exhibits no tenderness. 
 
 Abdominal: Soft. Bowel sounds are normal. exhibits no distension and no mass. There is no t
enderness. There is no rebound and no guarding. 
 
 Extremities/Musculoskeletal: Normal range of motion.exhibits no tenderness.  exhibits no ed
ema. Left leg bandage in place. 
   
 Neurological:  Alert and oriented to person, place, and time. Has normal reflexes.  display
s normal reflexes. No cranial nerve deficit.  Exhibits normal muscle tone. Coordination norm
al.
 
 
 Skin: Skin is warm and dry. No rash noted. No erythema. No pallor. 
 
 Psychiatric: Has a normal mood and affect. Behavior is normal. Judgment normal. 
 
 LABS:
 WBC 
 Date Value Ref Range Status 
 2018 7.20 3.80 - 11.00 K/uL Final 
 
 RBC 
 Date Value Ref Range Status 
 2018 2.75 (L) 3.70 - 5.10 M/uL Final 
 
 HGB 
 Date Value Ref Range Status 
 2018 9.5 (L) 11.3 - 15.5 g/dL Final 
 
 HCT 
 Date Value Ref Range Status 
 2018 28.7 (L) 34.0 - 46.0 % Final 
 
 MCV 
 Date Value Ref Range Status 
 2018 104.3 (H) 80.0 - 100.0 fl Final 
 
 MCH 
 Date Value Ref Range Status 
 2018 34.5 (H) 27.0 - 34.0 pg Final 
 
 MCHC 
 Date Value Ref Range Status 
 2018 33.1 32.0 - 35.5 g/dL Final 
 
 RDW SD 
 Date Value Ref Range Status 
 2018 61.7 (H) 37 - 53 fl Final 
 
 PLT 
 Date Value Ref Range Status 
 2018 111 (L) 150 - 400 K/uL Final 
 
 MPV 
 Date Value Ref Range Status 
 2018 8.3 fl Final 
 
 NEUTROPHILS ABS 
 Date Value Ref Range Status 
 2018 5.51 1.90 - 7.40 K/uL Final 
 
 LYMPHOCYTES ABS 
 Date Value Ref Range Status 
 2018 0.84 (L) 1.00 - 3.90 K/uL Final 
 
 EOSINOPHILS ABS 
 Date Value Ref Range Status 
 2018 0.00 0.00 - 0.50 K/uL Final 
 
 BASOPHILS ABS 
 Date Value Ref Range Status 
 2018 0.04 0.00 - 0.10 K/uL Final 
 
   Comment: 
   Testing performed at [facility];[address] [place] [id number] 
 
 Neutrophils Manual 
 Date Value Ref Range Status 
 2018 94 % Final 
 
 Lymphocytes Manual 
 Date Value Ref Range Status 
 2018 5 % Final 
 
 Monocytes Manual 
 Date Value Ref Range Status 
 2018 1 % Final 
 
 MORPHOLOGY 
 Date Value Ref Range Status 
 2018 2+  Corrected 
   Comment: 
   MACRO
 2+
 ANISO
 NORMAL PLT MORPH
 Testing performed at [facility], [address] [place]  [address]
  
 
 GLUCOSE 
 Date Value Ref Range Status 
 2018 92 65 - 99 mg/dL Final 
 
 BUN 
 Date Value Ref Range Status 
 2018 21 8 - 25 mg/dL Final 
 
 CREATININE 
 Date Value Ref Range Status 
 2018 4.3 (H) 0.50 - 1.00 mg/dL Final 
 
 BUN/CREAT 
 Date Value Ref Range Status 
 2018 5  Final 
 
 TOTAL PROTEIN 
 Date Value Ref Range Status 
 2018 6.3 6.3 - 8.2 g/dL Final 
 
 GLOBULIN 
 Date Value Ref Range Status 
 2018 3.7 1.3 - 4.9 g/dL Final 
 
 TBIL 
 Date Value Ref Range Status 
 2018 0.6 0.1 - 1.5 mg/dL Final 
 
 ALT 
 Date Value Ref Range Status 
 2018 13 10 - 65 U/L Final 
 
 AST 
 Date Value Ref Range Status 
 
 2018 24 10 - 45 U/L Final 
 
 SODIUM 
 Date Value Ref Range Status 
 2018 135 135 - 145 mmol/L Final 
 
 POTASSIUM 
 Date Value Ref Range Status 
 2018 4.4 3.5 - 4.9 mmol/L Final 
 
 CHLORIDE 
 Date Value Ref Range Status 
 2018 95 (L) 99 - 109 mmol/L Final 
 
 CO2 
 Date Value Ref Range Status 
 2018 29 23 - 32 mmol/L Final 
 
 ANION GAP AGAP 
 Date Value Ref Range Status 
 2018 15 5 - 20 mmol/L Final 
 
 [unfilled]
 HDL CHOL 
 Date Value Ref Range Status 
 2017 34 (L) >40 mg/dL Final 
 
 CHOLESTEROL 
 Date Value Ref Range Status 
 2017 101 <200 mg/dL Final 
 
 TSH 
 Date Value Ref Range Status 
 2017 1.14 0.45 - 5.10 uIU/mL Final 
   Comment: 
   Testing performed at [facility];[address] [place] [address];[place] [address] 
 
 TOTAL PROTEIN 
 Date Value Ref Range Status 
 2018 6.3 6.3 - 8.2 g/dL Final 
 
 TBIL 
 Date Value Ref Range Status 
 2018 0.6 0.1 - 1.5 mg/dL Final 
 
 BILI, DIRECT 
 Date Value Ref Range Status 
 2017 0.1 0.0 - 0.3 mg/dL Final 
 
 AST 
 Date Value Ref Range Status 
 2018 24 10 - 45 U/L Final 
 
 ALT 
 Date Value Ref Range Status 
 2018 13 10 - 65 U/L Final 
 
 Diagnostic:
 Xr Chest 2 View
 
 
 Result Date: 2018
 CHREIE VILLALOBOS 1950 68 years Female XR CHEST 2 VIEW FRONTAL AND LATERAL 2018
 5:27 PM INDICATION: Chest pain COMPARISON: 2018, CT chest 2017 TECHNIQUE: Two vie
w chest, PA and lateral views FINDINGS: The heart is stable in size. Patient is post median 
sternotomy. Right cardiac ICD in situ. Mild diffuse coarsening of lung markings. Suspect mil
d underlying interstitial edema. 1.6 cm perihilar opacity noted on the left. Blunting of the
 left costophrenic angle which may be secondary to effusion and/or scarring. Small right ple
ural effusion. Peripheral opacity along the lower lateral hemithorax, slightly increased que
stionable for artifact or loculated pleural fluid. Atelectasis or consolidation within the l
eft lung base, similar to the prior exam. 
 
 1.  Mild diffuse pulmonary edema, similar to the prior exam. 2.  Left perihilar airspace op
acity which may represent edema. Recommend repeat chest x-ray in 6-8 weeks to verify resolut
ion. 3.  Small left pleural effusion and/or pleural scarring, with adjacent atelectasis or c
onsolidation, similar to the prior exam. Small right pleural effusion. 4.  Peripheral opacit
y within the left lower hemithorax, slightly increased which is questionable for a loculated
 pleural fluid, parenchymal opacity, or artifact. Electronically signed by Chau Garcia on 2018 5:33 PM
 
 X-ray Foot Left
 
 Result Date: 2018
 CHERIE CHIU DEYSI 1950 XR FOOT LEFT 2018 9:54 AM INDICATION: Osteomyelitis of 
the left foot. COMPARISON: 2018 TECHNIQUE: Left foot series, 3 views, PA, obliq
ue and lateral views 
 
 Amputation of the left forefoot at the level of the proximal metatarsals. Surgical cutaneou
s staples are present. No radiographic evidence for osteomyelitis. Normal alignment of the h
indfoot. Mild degeneration of the midfoot. Electronically signed by Oleksandr Lemus MD on 2018 10:12 AM
 
 Nm Myocardial Perfusion Spect (stress And Rest)
 
 Result Date: 2018
 HISTORY: Chest pain. Peripheral vascular disease. Multiple coronary stents. Multiple previo
us myocardial infarctions. CABG. TECHNIQUE: The patient was intravenously injected with 10.5
 millicuries technetium 99m sestamibi. Rest gated supine SPECT images were obtained.  The pa
tient was then intravenously injected with 0.4 mg Regadenason per protocol.    The patient w
as finally intravenously injected with 30.1 mCi technetium 99m sestamibi, and stress gated s
upine SPECT, and prone SPECT scintigraphic images were obtained. COMPARISON: Echocardiogram 
18. Patient was injected on 17 for a rest SPECT study, but no images were perfor
med. Coronary arteriography 18. FINDINGS: Moderate left ventricular dilatation, unchan
ged between rest and stress. Thinning of the proximal inferior wall radiating minimally into
 the lateral wall. This is unchanged between rest and stress supine images. Stress prone lanre
ges show resolution of this finding, favoring artifact. There is mild thinning of the mid an
terior wall slightly radiating into the septum, unchanged between rest and stress. I cannot 
exclude infarct. LVEF measures 29% stress, 33% rest. Moderate global hypokinesis. Rest EDV 1
70 mL. Rest  mL. Stress  mL. Stress  mL. Transient ischemic dilatation 
ratio 1.06, normal. 
 
 1.  Mild to moderate left ventricular dilatation, unchanged between rest and stress. 2.  I 
cannot exclude mid anterior wall infarct. 3.  I see no evidence of ischemia. 4.  Global mode
rate hypokinesis. 5.  LVEF 29% stress, 33% rest. Using the risk stratification system at our
 institution, this examination equates to at least a high risk based on this imaging, arisin
g from the criteria below (1). Note that this should be interfaced with clinical and other d
patrizia, potentially affecting final categorization. American Heart Association and American Col
lege of Cardiology Scientific Statement. Circulation (2008); 118: p 0536-1475 Electronically
 signed by Wilfrido Knight MD on 2018 4:34 PM
 
 Ir Angiogram Extremity Left
 
 
 Result Date: 2018
 LOWER EXTREMITY ARTERIOGRAM, UNILATERAL PREOPERATIVE DIAGNOSIS:  Critical limb ischemia and
 need for left forefoot amputation POSTOPERATIVE DIAGNOSIS:  Same PROCEDURE: 1. Moderate con
scious sedation 2. Ultrasound guided access of the right common femoral artery 3. Aortogram 
4. Selective left common femoral artery catheterization with left leg runoff 5. Left common 
iliac artery angioplasty using 6 x 40 mm angioplasty balloon 6. Drug eluting balloon angiopl
asty of left SFA using 4 x 100 mm Lutonix balloon SURGEON: Kirt Mclaughlin MD ASSISTANT: Arianna ANEST
HESIA: Moderate sedation and local anesthesia Informed consent was obtained from the patient
. Continuous cardiac monitoring was performed throughout the procedure. Conscious sedation w
as provided by the nursing staff during the procedure under my supervision. Sedation time: 5
6 minutes Medications: 1 mg Versed IV, 100 mcg Fentanyl IV ESTIMATED BLOOD LOSS: Minimal CON
TRAST:  55 mL of Isovue 250 INDICATIONS:  See preoperative history and physical FINDINGS:  A
jabier with narrow aortic bifurcation. Moderate stenosis of proximal left common iliac artery 
making passing sheath difficult. Left SFA with moderate stenosis proximally. Single vessel r
unoff seen via left anterior tibial artery to foot. The posterior tibial artery does reconst
itute in left ankle via collaterals. Unable to pass sheath over aortic bifurcation due to il
iac disease and small arteries. After angioplasty, moderate angiographic results. Should be 
able to heal left forefoot amputation. If amputation does not heal, may need popliteal to po
sterior tibial artery bypass versus antegrade access of left common femoral artery with post
erior tibial artery recanalization. DESCRIPTION OF PROCEDURE:  The patient was properly iden
tified and brought to the Cath Lab. The patient was placed supine on the catheter table and 
patient was given moderate sedation by nursing staff under my supervision. Patient's bilater
al groins were then prepped and draped in the usual sterile fashion. Local anesthetic was gi
fidelia to the right groin and the right common femoral artery was accessed under ultrasound oksana
dance using a micropuncture needle. A micropuncture sheath was inserted over a wire and up s
ized to a 4 French sheath. A Omni flush catheter was then inserted into the distal aorta and
 aortogram was then performed with the findings as described above. The Omni Flush catheter 
was then used to guide a Glidewire into the left common femoral artery and then the catheter
 was then advanced over the wire. A left lower extremity runoff was then performed with the 
findings as described above. Next, an Amplatzer wire was then inserted over the catheter and
 the 4 French sheath was removed. A 6 French destination sheath was then inserted over the w
antione with the tip of the sheath being placed into the proximal left common iliac artery. The 
sheath would not pass through the diseased left common iliac artery. Angioplasty of the left
 common iliac artery was then performed using 6 x 40 mm angioplasty balloon. However, the sh
eath with still not pass after angioplasty. A vertebral catheter was inserted over the wire 
and the wire was then removed. A Glidewire was then placed into the vertebral catheter and u
sed to cross through the stenotic left superficial femoral artery.  Next, a 260 fix core wir
e was then inserted over the catheter.  Drug eluting balloon angioplasty of the left SFA was
 then performed using 4 x 100 mm Lutonix balloon. Unable to cross left posterior tibial britt
ry occlusion due to tip is sheath only being in left common iliac artery. A final arteriogra
m was then performed through the sheath. The destination sheath was then removed and a Mynx 
closure device was then used on the right common femoral artery and hemostasis was ensured. 
The patient was then taken to the observation unit for further monitoring. 
 
 1. Aorta with narrow aortic bifurcation with moderate stenosis of proximal left common errol
c artery. 2. Left SFA with moderate stenosis proximally. 3. Single vessel runoff to left jeanne
t via left anterior tibial artery. 4. Left posterior tibial artery and peroneal artery were 
occluded with reconstitution of distal posterior tibial artery at the left ankle via collate
rals. 5. If forefoot amputation does not heal, may need popliteal to posterior tibial artery
 bypass versus antegrade access of left common femoral artery with posterior tibial artery r
ecanalization. Electronically signed by Kirt Mclaughlin MD on 2018 10:51 AM
 
 Echo Cardiac Adult With Contrast
 
 Result Date: 2018
 Patient Name:  CHERIE VILLALOBOS YOB: 1950 Accession:  [id number] Performing Phys
ician:   Duglas Ornelas MD _______________________________________________________________ IND
ICATIONS ----------- SHORTNESS OF BREATH, DEFINITY WAS USED/SIGNIFICANT PATIENT REACTION FLA
NK PAIN CONCLUSIONS ----------- 1. Overall left ventricular systolic function is severely im
 
paired with, an EF between 20 - 25 %. 2. There is mild aortic regurgitation. 3. There is mil
d to  moderate aortic stenosis present. 4. Mild-to-moderate mitral regurgitation is present.
 5. There is moderate pulmonary hypertension. FINDINGS -------- Study: A 2-dimensional trans
thoracic echocardiogram with m-mode, spectral and color flow Doppler was perfomed. Study: Th
is was a technically adequate study. Left Ventricle: Overall left ventricular systolic funct
ion is severely impaired with, an EF between 20 - 25 %. Left Ventricle: The left ventricle c
avity size is normal. Left Ventricle: Left ventricular wall thickness is normal. Right Ventr
icle: The right ventricle is mildly enlarged measuring between 3.4 - 3.7 cm. Right Ventricle
: The right ventricular systolic function is at the low end of normal. Right Ventricle: Pace
r/ICD wire seen. Left Atrium: The left atrial size is normal Left Atrium: , and the LA measu
res 4.6cm. Right Atrium: The right atrium is normal in size. Right Atrium: Pacemaker wire se
en in the right atrial cavity. Aortic Valve: The aortic valve is trileaflet. Aortic Valve: T
he aortic valve is mildly calcified. Aortic Valve: There is mild aortic regurgitation. Aorti
c Valve: There is moderate aortic stenosis present. Mitral Valve: Mild-to-moderate mitral re
gurgitation is present. Mitral Valve: Mild mitral annular calcification present. Tricuspid V
alve: The tricuspid valve appears structurally normal. Tricuspid Valve: Mild tricuspid regur
gitation present. Tricuspid Valve: There is moderate pulmonary hypertension. Tricuspid Valve
: The right ventricular systolic pressure (pulmonary artery systolic pressure), as measured 
by Doppler, is 55.99mmHg. Pulmonic Valve: The pulmonic valve was not well visualized. Pulmon
ic Valve: Mild pulmonic regurgitation. Pericardium: There is no pericardial effusion. IVC/He
patic Veins: The IVC is normal size (1.5-2.5cm) and collapses >50% with sniff, consistent wi
th central venous pressures of 5-10mmHg. Contrast: Poor visualization. Definity was used to 
opacify the left ventricular chamber and improve delineation of the endocardial border. DIANE
UREMENTS ------------- CHEIKH Planimetry:   2.21 cm2 AVAI Planimetry:   0.00 cm2/m2 RA Area:   
13.84 cm2 IVC:   1.61 cm LA Major:   5.27 cm EDV(Teich):   121.12 ml IVSd:   0.82 cm LVIDd: 
  5.05 cm LVPWd:   0.88 cm LVOT Diam:   1.87 cm %FS:   9.90 % EF(Teich):   21.60 % ESV(Teich
):   94.95 ml IVSs:   0.96 cm LVIDs:   4.55 cm LVPWs:   1.05 cm SV(Teich):   26.17 ml RA Adan
or:   4.85 cm RVIDd:   3.62 cm LVEF MOD A2C:   23.68 % SV MOD A2C:   32.38 ml LVEF MOD A4C: 
  21.82 % SV MOD A4C:   27.33 ml EF Biplane:   22.47 % LVEDV MOD BP:   131.72 ml LVESV MOD B
P:   102.11 ml LVEDV MOD A2C:   136.73 ml LVLd A2C:   8.73 cm LVEDV MOD A4C:   125.25 ml LVL
d A4C:   8.87 cm LVESV MOD A2C:   104.34 ml LVLs A2C:   8.34 cm LVESV MOD A4C:   97.91 ml LV
Ls A4C:   8.60 cm LAESV(A-L):   48.15 ml LAESV Index (A-L):   26.17 ml/m2 LAAs A2C:   16.24 
cm2 LAESV A-L A2C:   47.34 ml LALs A2C:   4.73 cm LAAs A4C:   16.52 cm2 LAESV A-L A4C:   47.
99 ml LALs A4C:   4.83 cm Ao Diam:   2.59 cm AV Cusp:   1.65 cm LA Diam:   4.60 cm LA/Ao:   
1.77 TAPSE:   1.57 cm IVC diameter:   1.61 cm IVC collapse:   0.68 cm IVC % collapse:   56.1
6 % AR Dec Clermont:   3.95 m/s2 AR Dec Time:   955.07 ms AR maxP.06 mmHg AR PHT:   276.
97 ms AR Vmax:   3.77 m/s HR:   68.91 BPM AV maxP.56 mmHg AV meanPG:   10.23 mmHg AV 
Vmax:   2.21 m/s AV Vmean:   1.53 m/s AV VTI:   47.77 cm CHEIKH Vmax:   1.03 cm2 CHEIKH (VTI):   0
.99 cm2 AVAI Vmax:   0.00 cm2/m2 AVAI (VTI):   0.00 cm2/m2 LVCI Dopp:   1.80 l/minm2 LVCO Do
pp:   3.32 l/min HR:   70.30 BPM LVOT maxP.71 mmHg LVOT meanP.55 mmHg LVSI Dopp:
   25.70 ml/m2 LVSV Dopp:   47.30 ml LVOT Vmax:   0.82 m/s LVOT Vmean:   0.58 m/s LVOT VTI: 
  17.07 cm MCO:   459.56 ms MV A Hiram:   0.83 m/s MV DecT:   232.85 ms MV E Hiram:   1.70 m/s M
V E/A Ratio:   2.05 MV PHT:   80.47 ms MVA By PHT:   2.73 cm2 MV A Dur:   82.77 ms MV maxP.14 mmHg MV meanP.79 mmHg MV Vmax:   1.74 m/s MV Vmean:   1.01 m/s MV VTI:   45.3
3 cm MVA (VTI):   1.04 cm2 Septal e':   0.04 m/s Septal E/e':   38.01 Lateral e':   0.08 m/s
 Lateral E/e':   21.28 P Vein D:   0.84 m/s P Vein S/D Ratio:   0.56 P Vein S:   0.47 m/s PA
EDP:   8.38 mmHg PRend P.38 mmHg PRend Vmax:   1.16 m/s HR:   69.37 BPM PV maxP.
87 mmHg PV meanP.11 mmHg PV Vmax:   0.68 m/s PV Vmean:   0.50 m/s PV VTI:   15.15 cm R
AP:   3 mmHg RV S':   0.08 m/s RVSP:   55.99 mmHg TR maxP.99 mmHg TR Vmax:   3.63 m/s
 TV A Hiram:   0.44 m/s TV Dec Clermont:   6.11 m/s2 TV Dec Time:   133.74 ms TV E Hiram:   0.81 m/
s TV E/A Ratio:   1.81 Sonographer: CEE Authenticated by: Duglas Ornelas MD Report Date/Time: 
2018 17:39:35 
 
 1. Overall left ventricular systolic function is severely impaired with, an EF between 20 -
 25 %. 2. There is mild aortic regurgitation. 3. There is mild to  moderate aortic stenosis 
present. 4. Mild-to-moderate mitral regurgitation is present. 5. There is moderate pulmonary
 hypertension.
 
 [facility]
 
 
 Result Date: 2018
 This Point of Care (POC) ultrasound image has been reviewed and interpreted by the physicia
eloise ackerman as the performing physician in the associated interpretation and report. 
 
 PROBLEM LIST
 Principal Problem:
   Osteomyelitis of left foot (HCC)
 Active Problems:
   Secondary hyperparathyroidism (HCC)
   Depression
   ESRD (end stage renal disease) (HCC)
   Type 2 diabetes mellitus with chronic kidney disease on chronic dialysis, with long-term 
current use of insulin (HCC)
   Anemia in ESRD (end-stage renal disease) (HCC)
   Hypertension, essential
   Anemia of chronic renal failure, stage 5 (HCC)
   Diabetic foot ulcer (HCC)
   Chronic systolic congestive heart failure (HCC)
   Chronic anticoagulation
   Cardiomyopathy (HCC)
   Paroxysmal atrial fibrillation (HCC)
   S/P CABG x 2
   S/P mitral valve repair
   PVD (peripheral vascular disease) (HCC)
   CAD (coronary artery disease)
 
 ASSESSMENT & PLAN
 
 Osteomyelitis of left foot SP left TMA by Dr. Jordan on 18. Bone culture grew normal
 skin marzena. However bone pathology is pending. Patient is receiving cefepime with each HD p
er nephrology and ID recommendations. 
 - Dr. Jordan is following patient in the hospital. 
 - ID consult from Dr. peguero appreciated. Will follow recommendations regarding use of 
future antibiotic.  
 - Continue vanc and cefepime for now. Discussed case with Dr. Awad, Cefepime will need to
 be changed as outpatient dialysis center does not ady this antibiotic. Discussed with Dr. Elliott, he will see patient and adjust abx as necessary. 
 
 Phantom pain.Resolved.
 
 ESRD. On HD every T-T-S. Last HD yesterday. Appreciate help from nephrology services. 
 
 Type II DM with complications. Most recent blood sugars have been in acceptable range . She
 is on Lantus and SSI. Last glycohemoglobin A1c was 7.5 on 18. Will continue current r
egimen of Lantus and Humalog. 
 
 CAD SP CABG, paroxysmal atrial fibrillation, HFrEF. All cardiac problems are under control.
 Will continue apixaban, carvedilol, aspirin, Plavix, losartan and Imdur. 
 
 History of PAD. SP left leg angioplasty. On Plavix and aspirin. 
 
 Anemia of chronic kidney disease. Management per nephrology services. 
 
 Disposition: [facility] george Vincent, 
 2018 3:27 PM
 
 Michelle Awad MD - 2018  2:36 PM PSTFormatting of this note may be different from t
adrianna original.
 
 428/428-1  
    LOS: 3 days 
 She was admitted with plan for left TMA with Dr. Jordan which she had done 12/27/18.
 She feels good today and denied any fever, chills, nausea, vomiting, diarrhea, shortness of
 breath, chest pain, cough.  
 Left foot pain is reasonably controlled.
 Discharge planning is ongoing.
 
 PMH, PSH, Social history reviewed in chart. Allergies and medications reviewed. Previous hi
story summarized as above. Prior lab data and imaging reviewed.Patient's old records and lab
s were reviewed in detail and summarized.
 
 ROS:
 Review of systems is as per history of present illness. Otherwise a 14 organ system review 
of systems was done and is negative.
 
 Examination:
 
 APPEARANCE: She is is alert, awake, oriented sitting up in bed in no distress. 
 VITALS: Reviewed as listed.
 HEAD: NC/AT. 
 EYES: EOMI. Non-icteric sclera.
 NECK: No JVD. 
 LUNGS: Effort fair. CTA. 
 HEART: S1 soft, no pericardial rub noted. Systolic murmur at apex with radiation to back.
 ABDOMEN: Soft with minimal distention, no tenderness. No organomegaly or bruits noted. Benoit
l sounds diminished.
 EXTREMITIES: No LE edema. No cyanosis or clubbing. Peripheral pulses not palpable. Left jeanne
t s/p TMA with bandage.
 SKIN: Warm to touch. No rash.
 NEUROLOGIC: Alert, awake, oriented, speech fluent. Gait not tested. 
 PSYCH: pleasant demeanor..
 BACK: No CVA tenderness noted. There is no sacral edema.
 
 Dialysis access
 Left brachiocephalic AV fistula with no evidence of malfunction or inflammation
 
 vital Signs:
 /59 (BP Location: Right upper arm)  | Pulse 91  | Temp 98 F (36.7 C) (Oral)  | Re
sp 16  | Ht 1.778 m (5' 10")  | Wt 68.7 kg (151 lb 7.3 oz)  | SpO2 98%  | BMI 21.73 kg/m 
 
 Data evaluation:
 
 Lab Results 
 Component Value Date 
  BUN 21 2018 
  CREATININE 4.3 (H) 2018 
  EGFR 10 (L) 2018 
   2018 
  K 4.4 2018 
  CL 95 (L) 2018 
  CO2 29 2018 
  CA 8.8 2018 
  PHOS 4.9 (H) 2018 
  MG 2.2 2018 
  ALB 2.5 (L) 2018 
  HGB 9.5 (L) 2018 
 
 Lab Results 
 Component Value Date 
 
  HGB 9.5 (L) 2018 
  HGB 8.9 (L) 2018 
  HGB 9.6 (L) 2018 
  FERRITIN 722 (H) 2017 
  FERRITIN 793 (H) 2016 
  FERRITIN 883 (H) 2016 
  LABIRON 28 2017 
  LABIRON 17 2016 
  LABIRON 31 2016 
 
 Lab Results 
 Component Value Date 
  ANIONGAP 15 2018 
 
 Us Lower Extremity Arterial Left 2018 showed Greater than 50% stenosis of the mid sup
erficial femoral artery. 2.  Monophasic single runoff to the ankle via the anterior tibial a
rtery.
 
 X-ray Foot Left 2018 showed amputation of the left forefoot at the level of the proxi
mal metatarsals. No radiographic evidence for osteomyelitis. Normal alignment of the hindfoo
t. Mild degeneration of the midfoot. 
 
 Assessment and Recommendations:
 
 1. ESRD on HD
 2. Chronic systolic CHF
 3. Anemia chronic renal failure
 4. Chronic anticoagulation with Apixaban
 5. PAD s/p left TMA
 6. Paroxysmal Afib
 7. CAD s/p CABG
 
 She is hemodynamically stable with no fever/tachycardia/bradycardia/hypotension or severe h
ypertension/hypoxia at rest.
 She is euvolemic.
 BP is ok.
 
  Plan To Dialyze per usual prescription TTS.
  UF as tolerated
  To use LOUISA/Fe use to keep Hb at target range of 10 to 11.
  FU with vascular surgery/ID/Podiatry regarding discharge needs.
  Continue current ABx for now
 
 She should be on a 2 gm Na, 2 gm K, 1 gm PO4, 1 gm/kg protein diet.
 Please dose all medications for an eGFR of less than 15 ml/min/1.73 m2.
 NSAIDS/HOPPER 2 inhibitors should be avoided. Aluminum/magnesium containing antacids and magne
sium/phosphate containing enemas should be avoided.
 Fluid should be restricted to less than 1.5 L in a 24 hr period.
 Strict I/O should be done.
 Daily CMP should be done (including Mg, PO4).
 Plan of care was discussed with Dr. Vincent.
 
 MICHELLE AWAD MD
 2018
 
 Portions of my previous notes have been carried over for continuity of care.
 She was seen earlier in the day and charting was completed later after rounds.
 Dictation software, Dragon, was used which may contain error for similar sounding words. Pe
rsonal communication is requested for any clarification.
 Prognosis is guarded in view of multiple comorbid illnesses and ESRD including but not limi
 
ashly to death.
 
   apixaban  2.5 mg Oral BID 
   aspirin  81 mg Oral Daily with breakfast 
   atorvastatin  20 mg Oral Nightly 
   calcium acetate  667 mg Oral TID WC 
   carvedilol  3.125 mg Oral BID WC 
   cefepime  2 g Intravenous Q48H 
   clopidogrel  75 mg Oral Daily 
   furosemide  20 mg Oral Daily 
   insulin glargine  20 Units Subcutaneous Nightly 
   insulin lispro (human)  0-10 Units Subcutaneous TID AC 
   insulin lispro (human)  0-5 Units Subcutaneous Nightly 
   isosorbide mononitrate  60 mg Oral Daily 
   losartan  100 mg Oral Daily 
   nortriptyline  25 mg Oral Daily 
   nortriptyline  75 mg Oral Nightly 
   oxybutynin  2.5 mg Oral BID 
   pantoprazole  40 mg Oral QAM AC 
   rOPINIRole  0.75 mg Oral Nightly 
   vancomycin  750 mg Intravenous See Admin Instructions 
 
   dextrose   
 
 Andrés Elliott MD - 2018  8:30 AM PSTFormatting of this note may be different from the 
original.
 
 
 CONSULT NOTE
 2018
 8:31 AM
 
 PRIMARY PHYSICIAN
 Ivy Couch
 
 CONSULT REQUEST FROM
 Winston Vincent DO
 
 HISTORY OF PRESENT ILLNESS
 The patient is a 68 y.o.-year-old female  with history of ESRD on HD via fistula Tue/Thu/Sa
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
 CABG. Recent LLE angioplasty few weeks ago. She had been on  antibiotics for L foot first d
igit infection with possible osteomyelitis since November (Oral levofloxacin and clindamycin
 then transitioned to IV cefepime with HD until 18, no improvement noted on antibiotics
). She had followed with podiatrist and underwent L TMA on 18, intraoperative findings
 encountered purulence at first metatarsal. 
 Admitted on 18 to [facility]. 
 Wound culture shows skin marzena.
 DAy 4 of iv vancomycin and cefepime given after each hemodialysis
 Will change to iv vancmycin 500 mg iv and ceftazidime 2 gm iv after each hemodialysis unitl
until 18 for the infected foot.  
 The patient has problem with tremors on the hands will discontinue the cefepime. 
 Past Medical History 
 Diagnosis Date 
   Acute MI (formerly [facility])  
  with stenting x 1 
   Anemia associated with chronic renal failure 2016 
   Atrial fibrillation (formerly [facility])  
   Chronic kidney disease  
   Congestive heart failure (formerly [facility])  
 
   COPD (chronic obstructive pulmonary disease) (formerly [facility])  
   COPD (chronic obstructive pulmonary disease) (formerly [facility]) 2018 
   Coronary artery disease  
  stent placements: 3 total 
   Depression  
   Diabetes other 
  abstracted 
   Diabetes mellitus, type 2 (formerly [facility])  
   ESRD on peritoneal dialysis (formerly [facility])  
   GERD (gastroesophageal reflux disease)  
   Hemodialysis patient (formerly [facility]) 10/2016 
  Stopped peritoneal and restarted hemodialysis 
   Hemorrhage of gastrointestinal tract, unspecified 2017 
  low GI, rectal bleeding 
   High blood pressure other 
  abstracted 
   High cholesterol other 
  abstracted 
   Hyperlipidemia  
   Hypertension  
   Hyponatremia 2017 
   Myocardial infarction (formerly [facility]) 2017 
   Other chronic pain  
  feet,  
   Pain of left hip joint 2016 
  due to hip fracture and replacement 
   Partial small bowel obstruction (formerly [facility]) 2017 
  resolved 
   Renal failure  
  Stage 5.   Fistula in the JAN - not on dialysis yet. 
   Unspecified visual disturbance  
  glasses 
 
 Past Surgical History 
 Procedure Laterality Date 
   AV FISTULA PLACEMENT   
  left upper arm AV fistula - failed 
   AV FISTULA REPAIR N/A 10/25/2016 
  Procedure: AV FISTULA - GRAFT REPAIR/REVISION;  Surgeon: Kirt Mclaughlin MD;  Location: [facility]
IN OR;  Service: Vascular;  Laterality: N/A;  Superficialization and revision of left arm fi
stula 
   CARDIAC CATHETERIZATION  2017 
   CARPAL TUNNEL RELEASE Bilateral other 
  abstracted 
   CATARACT EXTRACTION Bilateral 2007 
   CATHETER REMOVAL N/A 2016 
  Procedure: DIALYSIS CATHETER - REMOVAL;  Surgeon: Kirt Mclaughlin MD;  Location: [facility] MAIN OR; 
 Service: Vascular;  Laterality: N/A;  PD cath removal 
   CHOLECYSTECTOMY  other 
  abstracted 
   COLONOSCOPY   
   CORONARY ARTERY BYPASS GRAFT   
   CORONARY STENT PLACEMENT  2017 
  Drug Elutin stents to OM, 1 stent to prox. LAD 
   EYE SURGERY   
   FOOT AMPUTATION Left 2018 
  Procedure: FOREFOOT - AMPUTATION;  Surgeon: Srinivasan Jordan DPM;  Location: [facility] MAIN OR;
  Service: Podiatry;  Laterality: Left; 
   HARDWARE PRESENT   
  stent placements x 3, Left hip replacement, vascular stents 2 in left leg 
 
   HIP ARTHROPLASTY Left 2016 
  Procedure: HIP - ARTHROPLASTY(ALLISON);  Surgeon: Uriel Roa MD;  Location: [facility] MAIN 
OR;  Service: Orthopedics;  Laterality: Left; 
   HYSTERECTOMY   
  complete 
   PACEMAKER INSERTION   
  [employer] pacemaker/defib 
   PERITONEAL CATHETER INSERTION  8/15/2013 
   PERITONEAL CATHETER INSERTION N/A 2016 
  Procedure: LAPAROSCOPIC - PERITONEAL DIALYSIS CATH INSERTION;  Surgeon: Kirt Mclaughlin MD;  Lo
cation: [facility] MAIN OR;  Service: Vascular;  Laterality: N/A;  Removal of old catheter 
   SHOULDER SURGERY Right  
  frozen shoulder 
   UNLISTED PROCEDURE ARTHROSCOPY   
  total Left hip 
   vascular stents Left 2017 
  leg (2 stents) 
   VASCULAR SURGERY   
 
 Current Facility-Administered Medications 
 Medication Dose Route Frequency Provider Last Rate Last Dose 
   acetaminophen (TYLENOL) tablet 650 mg  650 mg Oral Q6H PRN Srinivasan Jordan DPM     
  Or 
   acetaminophen (TYLENOL) suppository 650 mg  650 mg Rectal Q6H PRN Srinivasan Jordan DPM 
    
   albumin human 25 % solution 12.5 g  12.5 g Intravenous Q1H PRN Michelle Awad MD   12.5
 g at 18 1041 
   albuterol (PROVENTIL HFA;VENTOLIN HFA) inhaler  2 puff Inhalation Q4H PRN Srinivasan Dhillon
an, DPM     
   apixaban (ELIQUIS) tablet 2.5 mg  2.5 mg Oral BID Srinivasan Jordan DPM   2.5 mg at 12/3
1/18 0746 
   aspirin EC tablet 81 mg  81 mg Oral Daily with breakfast Srinivasan Jordan DPM   81 mg a
t 18 0745 
   atorvastatin (LIPITOR) tablet 20 mg  20 mg Oral Nightly Srinivasan Jordan DPM   20 mg at
 18 
   calcium acetate (PHOSLO) capsule 667 mg  667 mg Oral TID  Srinivasan Jordan DPM   667 
mg at 18 0746 
   carvedilol (COREG) tablet 3.125 mg  3.125 mg Oral BID  Srinivasan Jordan DPM   3.125 m
g at 18 0746 
   ceFEPIme (MAXIPIME) IVPB 2 g  2 g Intravenous Q48H João Asher MD   2 g at 18 2
030 
   clopidogrel (PLAVIX) tablet 75 mg  75 mg Oral Daily Srinivasan Jordan DPM   75 mg at  0746 
   dextrose 10 % infusion   Intravenous Continuous PRN Srinivasanmario Jordan, DPM     
   dextrose 50 % solution 12 mL  12 mL Intravenous PRN Srinivasan Jordan, DPM     
   dextrose 50 % solution 25 mL  25 mL Intravenous PRN Srinivasan L Julian, DPM     
   furosemide (LASIX) tablet 20 mg  20 mg Oral Daily Srinivasanmario Jordan, DPM   20 mg at  0746 
   glucagon (GLUCAGEN) injection 0.5 mg  0.5 mg Intramuscular PRN Srinivasan Jordan, DPM    
 
   glucagon (GLUCAGEN) injection 1 mg  1 mg Intramuscular PRN Srinivasan Jordan, DPM     
   HYDROcodone-acetaminophen (NORCO) 5-325 MG per tablet 1 tablet  1 tablet Oral Q4H PRN B
axel Jordan DPM   1 tablet at 18 0646 
  Or 
   HYDROcodone-acetaminophen (NORCO)  MG per tablet 1 tablet  1 tablet Oral Q4H PRN 
Srinivasan Jordan, DPM   1 tablet at 18 1446 
   insulin glargine (LANTUS) injection 20 Units  20 Units Subcutaneous Nightly Augustin Bourne MD   20 Units at 18 2158 
   insulin lispro (human) (HUMALOG) injection 0-10 Units  0-10 Units Subcutaneous TID AC B
axel Jordan DPM   1 Units at 18 0629 
 
   insulin lispro (human) (HUMALOG) injection 0-5 Units  0-5 Units Subcutaneous Nightly Br
mario Jordan DPM   Stopped at 18 2133 
   isosorbide mononitrate (IMDUR) 24 hr tablet 60 mg  60 mg Oral Daily Srinivasan Jordan DP
M   60 mg at 18 0746 
   loperamide (IMODIUM) capsule 2 mg  2 mg Oral PRN Srinivasan Jordan, DPM     
   LORazepam (ATIVAN) tablet 1 mg  1 mg Oral Daily PRN Srinivasan Jordan DPM   1 mg at  0723 
   losartan (COZAAR) tablet 100 mg  100 mg Oral Daily Srinivasan Jordan, DPM   100 mg at  0746 
   nitroGLYCERIN (NITROSTAT) SL tablet 0.4 mg  0.4 mg Sublingual Q5 Min PRN Srinivasan navas, DPM   0.4 mg at 18 
   nortriptyline (PAMELOR) capsule 25 mg  25 mg Oral Daily GELACIO TerryM   25 mg at
 18 0745 
   nortriptyline (PAMELOR) capsule 75 mg  75 mg Oral Nightly João Asher MD   75 mg at 1
2023 
   ondansetron (ZOFRAN-ODT) disintegrating tablet 4 mg  4 mg Oral Q6H PRN GELACIO TerryM   4 mg at 18 0835 
  Or 
   ondansetron (ZOFRAN) injection 4 mg  4 mg Intravenous Q6H PRN Srinivasan Jordan DPM     
   oxybutynin (DITROPAN) tablet 2.5 mg  2.5 mg Oral BID GELACIO TerryM   2.5 mg at 1
 0745 
   pantoprazole (PROTONIX) EC tablet 40 mg  40 mg Oral QAM AC GELACIO TerryM   40 mg
 at 18 06 
   polyethylene glycol (GLYCOLAX) packet 17 g  17 g Oral Daily PRN Srinivasan Jordan, REBEKAH   
  
   prochlorperazine tablet 5 mg  5 mg Oral BID PRN Srinivasan Jordan DPM     
   rOPINIRole (REQUIP) tablet 0.75 mg  0.75 mg Oral Nightly GELACIO TerryM   0.75 mg
 at 18 
   sodium chloride (PF) 0.9 % flush 10 mL  10 mL Intravenous Q8H PRN GELACIO TerryM 
  10 mL at 18 0747 
   vancomycin (VANCOCIN) 750 mg/250 mL IVPB  750 mg Intravenous See Admin Instructions Rene
 H Frost, RPH     
 
 Allergies 
 Allergen Reactions 
   Quinapril Hcl Anaphylaxis 
   Digoxin And Related Other (See Comments) 
   toxic 
   Metaxalone Other (See Comments) 
   Morphine Mental Changes 
   Make her crazy/ "funny feeling in my head"
 Has used hydromorphone in-house 
   Pantoprazole Sodium Nausea and Vomiting 
   Penicillins Rash 
   Quinapril Hcl Edema 
   Facial Edema 
   Sudafed [Pseudoephedrine Hcl] Rash 
 
 Social History 
 
 Social History 
   Marital status:  
   Spouse name: N/A 
   Number of children: N/A 
   Years of education: N/A 
 
 Occupational History 
   Not on file. 
 
 Social History Main Topics 
 
   Smoking status: Former Smoker 
   Packs/day: 1.00 
   Years: 30.00 
   Types: Cigarettes 
   Quit date: 2004 
   Smokeless tobacco: Never Used 
    Comment: quite smoking  
   Alcohol use No 
   Drug use: No 
   Sexual activity: No 
 
 Other Topics Concern 
   Not on file 
 
 Social History Narrative 
  She lives with . 2 kids. Worked in banking 
 
 No smoking. No alcohol intake. Recreational Drug Use
 NO Travel
 mp Pets
 Immunization History 
 Administered Date(s) Administered 
   Hepatitis B Adult 2016 
   Influenza, Trivalent W/Preservative 2016 
   Pneumococcal Polysaccharide 23-valent 2012 
  
 
 Pneumococcal
 Influenza
 
 Family History 
 Problem Relation Age of Onset 
   High cholesterol Father  
   Hypertension Father  
   Diabetes Paternal Grandmother  
   Malig hypertherm Neg Hx  
   Kidney disease Neg Hx  
 
 REVIEW OF SYSTEMS
 
 General: The patient noted to have no  problem of fever,no  weight loss
  and no chills.
 
 Neurologic: Denies any headache, any lightheadedness. No loss of
 
 consciousness or seizure.
 
 Cardiac: with  Chest Pain,with  Palpitation,no  Dyspnea on Exertion
 
 Pulmonary: Denies cough, wheezing and hemoptysis
 
 GASTROINTESTINAL: Denies nausea vomiting, and diarrhea.
 GENITOURINARY: Noted no dysuria, urgency, or frequency.
 Hematological : Denies any ecchymosis, bleeding or hematuria
 
 Endocrine: Denies any polyphagia, polyuria or hot and cold intolerance
  Musculoskeletal: no new joint pain and swelling. 
 Skin : Denies any new rashes or cutaneous changes.
 
 Rest of the review of systems is unremarkable.
 
 
 PHYSICAL EXAMINATION
 
 VITAL SIGNS 
 Wt Readings from Last 3 Encounters: 
 18 68.7 kg (151 lb 7.3 oz) 
 18 65.8 kg (145 lb 1 oz) 
 18 65.9 kg (145 lb 4.5 oz) 
 
 Temp Readings from Last 3 Encounters: 
 18 98.6 F (37 C) (Oral) 
 18 98.2 F (36.8 C) (Oral) 
 18 97.9 F (36.6 C) (Oral) 
 
 BP Readings from Last 3 Encounters: 
 18 122/57 
 18 139/63 
 18 111/61 
 
 Pulse Readings from Last 3 Encounters: 
 18 77 
 18 74 
 18 85 
 
 Head:  Normocephalic atraumatic
 
 HEENT: Pink palpebral conjunctivae. Anicteric sclerae. No
 tonsillopharyngeal congestion.
 
 Neck : Noted no  Cervical Lymphadenopathy
 
 CHEST:Clear Breath Sounds Bilateral . 
 
 HEART: Regular rate and rhythm. No murmurs thrills or rubs
 
 ABDOMEN: Soft, flat. No tenderness. No hepatosplenomegaly.
 
 GROIN AREA: Negative  for intertrigo.
 
 EXTREMITIES: upper extremity no lesion s.
 Right lower extremities no edema. Left foot with dressing.
  
 
 NEUROLOGIC: No localizing signs.
 
 Skin : NO rash or ecchymosis. 
 
 LABORATORY DATA
 
 Lab Results 
 Component Value Date 
  WBC 7.20 2018 
  HGB 9.5 (L) 2018 
  HCT 28.7 (L) 2018 
   (L) 2018 
  CHOL 101 2017 
  TRIG 132 2017 
  HDL 34 (L) 2017 
  ALT 13 2018 
  AST 24 2018 
 
   2018 
  K 4.4 2018 
  CL 95 (L) 2018 
  CREATININE 4.3 (H) 2018 
  BUN 21 2018 
  CO2 29 2018 
  TSH 1.14 2017 
  INR 1.0 2018 
  GLUF 92 2018 
  HGBA1C 7.5 (H) 2018 
 
 Hepatic Function Panel:  
 Lab Results 
 Component Value Date 
  PROT 6.3 2018 
  ALB 2.5 (L) 2018 
  BILITOT 0.6 2018 
  BILIDIR 0.1 2017 
  ALP 76 2018 
  AST 24 2018 
  ALT 13 2018 
  
 
 Lipase: 
 Lab Results 
 Component Value Date 
  LIPASE 332 2017 
  
 U/A:  
 Lab Results 
 Component Value Date 
  COLORU YELLOW 2011 
  CLARITYU Slightly Cloudy 10/16/2018 
  MEROPENEM 1.009 2013 
  LEUKOCYTESUR  10/16/2018 
    Comment: 
    small 
  NITRITE Negative 10/16/2018 
  UROBILINOGEN Normal 10/16/2018 
  UPRO  10/16/2018 
    Comment: 
    100 
  UPRO 100 2013 
  PHUR 8 10/16/2018 
  BLOODU Negative 10/16/2018 
  KETONES negative 10/16/2018 
  BILIRUBINUR Negative 10/16/2018 
  GLUCOSEU  10/16/2018 
    Comment: 
    moderate 
  
 DIAGNOSTIC IMAGING
 
 CAT scan 
 
 Ultrasound
   
 Xr Chest 2 View
 
 Result Date: 2018
 
 1.  Mild diffuse pulmonary edema, similar to the prior exam. 2.  Left perihilar airspace op
acity which may represent edema. Recommend repeat chest x-ray in 6-8 weeks to verify resolut
ion. 3.  Small left pleural effusion and/or pleural scarring, with adjacent atelectasis or c
onsolidation, similar to the prior exam. Small right pleural effusion. 4.  Peripheral opacit
y within the left lower hemithorax, slightly increased which is questionable for a loculated
 pleural fluid, parenchymal opacity, or artifact. Electronically signed by Chau Garcia on 1
2018 5:33 PM
 
 X-ray Foot Left
 
 Result Date: 2018
 Amputation of the left forefoot at the level of the proximal metatarsals. Surgical cutaneou
s staples are present. No radiographic evidence for osteomyelitis. Normal alignment of the h
indfoot. Mild degeneration of the midfoot. Electronically signed by Oleksandr Lemus MD on 2018 10:12 AM
 
 Ct Head Without Contrast
 
 Result Date: 2018
 1.  No acute intracranial pathology. Electronically signed by Steven Samano MD on  5:28 PM
 
 Nm Myocardial Perfusion Spect (stress And Rest)
 
 Result Date: 2018
 1.  Mild to moderate left ventricular dilatation, unchanged between rest and stress. 2.  I 
cannot exclude mid anterior wall infarct. 3.  I see no evidence of ischemia. 4.  Global mode
rate hypokinesis. 5.  LVEF 29% stress, 33% rest. Using the risk stratification system at our
 institution, this examination equates to at least a high risk based on this imaging, arisin
g from the criteria below (1). Note that this should be interfaced with clinical and other d
patrizia, potentially affecting final categorization. American Heart Association and American Col
lege of Cardiology Scientific Statement. Circulation (2008); 118: p 9792-4495 Electronically
 signed by Wilfrido Knight MD on 2018 4:34 PM
 
 Ir Angiogram Extremity Left
 
 Result Date: 2018
 1. Aorta with narrow aortic bifurcation with moderate stenosis of proximal left common errol
c artery. 2. Left SFA with moderate stenosis proximally. 3. Single vessel runoff to left jeanne
t via left anterior tibial artery. 4. Left posterior tibial artery and peroneal artery were 
occluded with reconstitution of distal posterior tibial artery at the left ankle via collate
rals. 5. If forefoot amputation does not heal, may need popliteal to posterior tibial artery
 bypass versus antegrade access of left common femoral artery with posterior tibial artery r
ecanalization. Electronically signed by Kirt Mclaughlin MD on 2018 10:51 AM
 
 Cherie Villalobos is a 68 y.o. female with the following Problems 
 Patient Active Problem List 
 Diagnosis 
   Proteinuria 
   Vitamin D deficiency 
   Secondary hyperparathyroidism (HCC) 
   Recurrent UTI 
   Coronary artery disease involving native coronary artery of native heart without angina
 pectoris 
   Depression 
   ESRD (end stage renal disease) (formerly [facility]) 
   Type 2 diabetes mellitus with chronic kidney disease on chronic dialysis, with long-ter
m current use of insulin (HCC) 
   Anemia in ESRD (end-stage renal disease) (formerly [facility]) 
   Hypertension, essential 
 
   Anemia of chronic renal failure, stage 5 (formerly [facility]) 
   Dyslipidemia 
   Gastroesophageal reflux disease without esophagitis 
   Diabetic foot ulcer (formerly [facility]) 
   Loss of weight 
   Dysphagia, unspecified 
   Foot ulcer due to DM  (formerly [facility]) 
   Severe protein-calorie malnutrition (HCC) 
   Osteomyelitis of left foot (HCC) 
   Pleural effusion 
   Prolonged Q-T interval on ECG 
   Chronic systolic congestive heart failure (HCC) 
   Chronic diarrhea 
   Chronic anticoagulation 
   Plantar ulcer (HCC) 
   Cardiomyopathy (HCC) 
   COPD (chronic obstructive pulmonary disease) (HCC) 
   ICD (implantable cardioverter-defibrillator) in place 
   Implantable defibrillator reprogramming/check 
   Mixed hyperlipidemia 
   Moderate protein-calorie malnutrition (HCC) 
   Paroxysmal atrial fibrillation (HCC) 
   S/P CABG x 2 
   S/P mitral valve repair 
   Severe mitral regurgitation 
   Steroid-induced hyperglycemia 
   Stress hyperglycemia 
   Chronic multifocal osteomyelitis of left foot (HCC) 
   PVD (peripheral vascular disease) (HCC) 
   CAD (coronary artery disease) 
   PVD (peripheral vascular disease) (formerly [facility]) 
 
 Plan:
 
 CBC CMP ESR CRP 
 q Monday 
 Vancomycin 500 mg iv and ceftazidime 2 gm after each hemodialysis
 
 RECOMMENDATIONS
 Start discharge planning for iv vancomycin and cefrazidime for 6 weeks unitil 19 
 
 Discussed with Dr. Vincent hospitalist  who agreed and will proceed
 As plan.
 Thank you very much for the consult.
 ______________________
 
 ______________________
 MD Alonzo FRANKLIN Saruzma AYALA RP - 2018  6:43 PM PSTFormatting of this note may be different from 
the original.
 Initiation of Vancomycin 
 Pharmacy Dosing 
 Cherie Villalobos 68 y.o. female
 1.778 m (5' 10")
 68.7 kg (151 lb 7.3 oz)
 Body mass index is 21.73 kg/m.
 Ideal body weight: 68.5 kg (151 lb 0.2 oz)
 Adjusted ideal body weight: 68.6 kg (151 lb 3.1 oz) 
 CREATININE 
 
 Date Value Ref Range Status 
 2018 4.3 (H) 0.50 - 1.00 mg/dL Final 
 
 Estimated CrCl : Serum creatinine: 4.3 mg/dL (H) 18 0547
 Estimated creatinine clearance: 13.4 mL/min (A)
 Indications: Osteomylelitis. 
 
 Dose per Protocol: 
 Loading Dose: Vancomycin 1000 mg (14.6 mg/kg TBW) IV once per Dr. BRIAN Peguero ([phone]
[phone])
 
 Will give Vancomycin 750 mg in the last hour or directly after each session of hemodialysis
.  
 
 Hemodialysis scheduled for every Tuesday, Thursday, and Saturday.
 
 First Dose to be Given: 2018 @ 1745
 
 Vancomycin Random Due: 2018 with AM draw.  
 
 Drawing level with AM labs since the load is less than would have been done per protocol fo
r a patient < 90 kg.
 
 Goal Trough for Vancomycin: 15-20 mcg/mL
 Pharmacist: Brenna Rosado
 2018 5:47 PM
 
 Michael Peguero MD - 2018  4:55 PM PSTFormatting of this note may be different f
rom the original.
 Infectious Disease
 Progress Note
 
 Hospital Day:   LOS: 2 days 
 SUBJECTIVE
 Doing well in general. No fever, rash, NV or diarrhea. 
 
 Antibiotics:
 1. IV cefepime with HD
 
 Allergy:
 Allergies 
 Allergen Reactions 
   Quinapril Hcl Anaphylaxis 
   Digoxin And Related Other (See Comments) 
   toxic 
   Metaxalone Other (See Comments) 
   Morphine Mental Changes 
   Make her crazy/ "funny feeling in my head"
 Has used hydromorphone in-house 
   Pantoprazole Sodium Nausea and Vomiting 
   Penicillins Rash 
   Quinapril Hcl Edema 
   Facial Edema 
   Sudafed [Pseudoephedrine Hcl] Rash 
 
 OBJECTIVE
 Vital Signs:
 BP 91/52 (BP Location: Right upper arm)  | Pulse 69  | Temp 98.5 F (36.9 C) (Oral)  | R
gopal 16  | Ht 1.778 m (5' 10")  | Wt 68.7 kg (151 lb 7.3 oz)  | SpO2 97%  | BMI 21.73 kg/m 
 
 
 Examination:
 
 GA: Patient is alert and oriented. Appears well-developed, not in distress. 
 HEENT: 
 Head: Normocephalic and Atraumatic. 
 Nose: Nose normal. 
 Neck: Neck supple. No palpable mass. 
 Cardiovascular: Regular rate and rhythm, no gallops. 
 Pulmonary/Chest:  Symmetrical chest expansion, no labored breathing. 
 Abdominal: Soft, bowel sounds are present, no distension, no ascites. 
 Musculoskeletal: LLE on dressing.  
 Neurological:  No focal neurologic deficits. Oriented well to place time and person. 
 Skin: Skin is warm and dry. No rash noted.
 Psychiatric: No psychosis, reported normal mood. Judgment normal. 
 
 LABS:
 
 Results  
  Procedure Component Value Units Date/Time 
  Anaerobic Culture W/Gram Stain [[id number]] Collected:  18 1320 
  Specimen:  Wound Updated:  18 1029 
   Specimen Description WOUND 
   SPECIAL REQUESTS FIRST METATARSAL LEFT FOOT 
   GRAM STAIN NO CELLS OR ORGANISMS SEEN 
   CULTURE 1+ 
    NORMAL SKIN MARZENA ISOLATED 
  
 
 ASSESSMENT & PLAN
 67 yo female, ESRD on HD, CAD post CABG, PVD post LLE angioplasty few weeks ago. Chronic L 
foot infection, post TMA on 18, concerning for residual infection. Preliminary surgica
l culture grew skin marzena only. Discussed with TCL, culture would be finalized tomorrow. 
 
 Recommendations
 
 1. Continue IV cefepime after HD sessions.
 2. Start IV vancomycin now and continue after HD sessions.
 3. Await final surgical culture
 4. Await bone pathology
 5. Weekly CBC, ESR, CRP on antibiotics
 6. Plan to de-escalate antibiotics based on final surgical culture. 
 
 Plan discussed with patient. She understands and agree with plan. 
 
 Michael Peguero MD
 2018
 
 Moiz Josue MD - 2018  1:11 PM PSTFormatting of this note may be different
 from the original.
 [facility]  
 Service:  Hospitalist
 Progress Note
 
 Hospital Day:   LOS: 2 days 
 Post-Op Day:  1 Day Post-Op
 SUBJECTIVE
 
      Patient Summary:    Ms. Villalobos is a 68-year-old female who has history of end-stage re
nal disease on hemodialysis 3 times a week, type 2 diabetes mellitus, hypertension, peripher
al arterial disease status post angioplasty of left lower extremity, coronary artery disease
 
 status post CABG, status post aortic valve replacement in 2018, heart failure with reduc
ed ejection fraction status post AICD placement, atrial fibrillation on apixaban developed i
nfection of left first metatarsal bone and underwent left transmetatarsal amputation yesterd
ay by Dr. Jordan.  During surgery, Dr. Jordan thought that patient has significant osteomy
elitis of foot, hence she was admitted under the care of hospitalist and is getting IV cefep
reyna with hemodialysis, recommendations of Dr. Asher.
 
      Events Overnight:  Patient C/O minimal left foot pain. She denies any other symptoms. 
She was dialyzed yesterday. 
 
 Scheduled Medications 
   apixaban  2.5 mg Oral BID 
   aspirin  81 mg Oral Daily with breakfast 
   atorvastatin  20 mg Oral Nightly 
   calcium acetate  667 mg Oral TID WC 
   carvedilol  3.125 mg Oral BID WC 
   cefepime  2 g Intravenous Q48H 
   clopidogrel  75 mg Oral Daily 
   furosemide  20 mg Oral Daily 
   insulin glargine  20 Units Subcutaneous Nightly 
   insulin lispro (human)  0-10 Units Subcutaneous TID AC 
   insulin lispro (human)  0-5 Units Subcutaneous Nightly 
   isosorbide mononitrate  60 mg Oral Daily 
   losartan  100 mg Oral Daily 
   nortriptyline  25 mg Oral Daily 
   nortriptyline  75 mg Oral Nightly 
   oxybutynin  2.5 mg Oral BID 
   pantoprazole  40 mg Oral QAM AC 
   rOPINIRole  0.75 mg Oral Nightly 
 
 Continuous Infusions 
   dextrose   
 
 PRN Medications
 acetaminophen **OR** acetaminophen, albumin human, albuterol, dextrose, dextrose, dextrose,
 glucagon, glucagon, HYDROcodone-acetaminophen **OR** HYDROcodone-acetaminophen, loperamide,
 LORazepam, nitroGLYCERIN, ondansetron **OR** ondansetron, polyethylene glycol, prochlorpera
zine, saline lock IV - when tolerating PO fluids **AND** sodium chloride (PF)
 
 OBJECTIVE
 Vital Signs:
 /53 (BP Location: Right upper arm)  | Pulse 76  | Temp 98.4 F (36.9 C) (Oral)  | 
Resp 16  | Ht 1.778 m (5' 10")  | Wt 68.7 kg (151 lb 7.3 oz)  | SpO2 100%  | BMI 21.73 kg/m
 
 Temp:  [98.3 F (36.8 C)-100.1 F (37.8 C)] 98.4 F (36.9 C) ( 1050)
 BP: (100-127)/(53-59) 108/53 ( 1050)
 Heart Rate:  [72-98] 76 ( 1050)
 Resp:  [16-18] 16 ( 1050)
 SpO2:  [91 %-100 %] 100 % ( 1050)
 I&O Last 3 Shifts:   1900 -  0659
 In: 1895 [P.O.:995]
 Out: 2400 
 
 Physical Exam 
 Constitutional: She is oriented to person, place, and time. She appears well-developed and 
well-nourished. No distress. 
 HENT: 
 Head: Normocephalic. 
 Mouth/Throat: Oropharynx is clear and moist. No oropharyngeal exudate. 
 Eyes: Pupils are equal, round, and reactive to light. Conjunctivae are normal. No scleral i
 
cterus. 
 Neck: Neck supple. No JVD present. 
 Cardiovascular: Normal rate, regular rhythm and normal heart sounds.  Exam reveals no antunez
p and no friction rub.  
 Pulmonary/Chest: Effort normal and breath sounds normal. No respiratory distress. She has n
o wheezes. She has no rales. She exhibits no tenderness. 
 Abdomina/Gl: Soft. Bowel sounds are normal. She exhibits no distension and no mass. There i
s no tenderness. There is no rebound and no guarding. No hernia. 
 Musculoskeletal: 
 Left foot covered with dressing. 
 No right pedal edema.  
 Neurological: She is alert and oriented to person, place, and time. No cranial nerve defici
t. 
 Skin: Skin is warm and dry. No rash noted. She is not diaphoretic. No erythema. There is pa
llor. 
 Psychiatric: She has a normal mood and affect. Her behavior is normal. Judgment and thought
 content normal. 
 Nursing note and vitals reviewed.
 
 DATA
 
 CBC:  
 Lab Results 
 Component Value Date 
  WBC 7.20 2018 
  RBC 2.75 (L) 2018 
  HGB 9.5 (L) 2018 
  HCT 28.7 (L) 2018 
  .3 (H) 2018 
  MCH 34.5 (H) 2018 
  MCHC 33.1 2018 
  RDW 61.7 (H) 2018 
   (L) 2018 
  MPV 8.3 2018 
  DIFFTYPE AUTOMATED 2018 
 
 CMP:  
 Lab Results 
 Component Value Date 
   2018 
  K 4.4 2018 
  CL 95 (L) 2018 
  CO2 29 2018 
  ANIONGAP 15 2018 
  GLUF 92 2018 
  BUN 21 2018 
  CREATININE 4.3 (H) 2018 
  BCR 5 2018 
  CA 8.8 2018 
  CA 8.7 2016 
  PROT 6.3 2018 
  ALB 2.5 (L) 2018 
  GLOB 3.7 2018 
  BILITOT 0.6 2018 
  ALP 76 2018 
  AST 24 2018 
  ALT 13 2018 
  EGFR 10 (L) 2018 
 
 X-ray Foot Left
 
 
 Result Date: 2018
 Amputation of the left forefoot at the level of the proximal metatarsals. Surgical cutaneou
s staples are present. No radiographic evidence for osteomyelitis. Normal alignment of the h
indfoot. Mild degeneration of the midfoot. Electronically signed by Oleksandr Lemus MD on 2018 10:12 AM
 
 PROBLEM LIST
 
 Principal Problem:
   Osteomyelitis of left foot (formerly [facility])
 Active Problems:
   Depression
   ESRD (end stage renal disease) (formerly [facility])
   Type 2 diabetes mellitus with chronic kidney disease on chronic dialysis, with long-term 
current use of insulin (HCC)
   Anemia in ESRD (end-stage renal disease) (formerly [facility])
   Hypertension, essential
   Anemia of chronic renal failure, stage 5 (HCC)
   Diabetic foot ulcer (HCC)
   Chronic systolic congestive heart failure (HCC)
   Chronic anticoagulation
   Cardiomyopathy (HCC)
   Paroxysmal atrial fibrillation (HCC)
   PVD (peripheral vascular disease) (HCC)
   CAD (coronary artery disease)
   PVD (peripheral vascular disease) (formerly [facility])
 
 Patient diagnosed with:  , and I agree with the following nutritional recommendations:
  
 
 ASSESSMENT & PLAN
 
 Osteomyelitis of left foot SP left TMA by Dr. Jordan on 18. Bone culture grew normal
 skin marzena. However bone pathology is pending. Patient is receiving cefepime with each HD p
er nephrology and ID recommendations. Dr. Jordan is following patient in the hospital.  ID 
consult from Dr. peguero appreciated. Will follow recommendations regarding use of future
 antibiotic.  
 
 Phantom pain.Resolved.
 
 ESRD. On HD every T-T-S. Last HD yesterday. Appreciate help from nephrology services. 
 
 Type II DM with complications. Most recent blood sugars have been in acceptable range . She
 is on Lantus and SSI. Last glycohemoglobin A1c was 7.5 on 18. Will continue current r
egimen of Lantus and Humalog. 
 
 CAD SP CABG, paroxysmal atrial fibrillation, HFrEF. All cardiac problems are under control.
 Will continue apixaban, carvedilol, aspirin, Plavix, losartan and Imdur. 
 
 History of PAD. SP left leg angioplasty. On Plavix and aspirin. 
 
 Anemia of chronic kidney disease. Management per nephrology services. 
 
 Discussed with Dr. Jordan yesterday. She will  need SNF transfer upon discharge.
 
 Disposition:  Likely discharge to SNF on 18. 
 Code Status:  Prior
 
 Moiz Josue MD
 
 2018PiMoiz haynes MD - 2018  1:45 PM PSTFormatting of this note may be
 different from the original.
 [facility]  
 Service:  Hospitalist
 Progress Note
 
 Hospital Day:   LOS: 1 day 
 Post-Op Day:  1 Day Post-Op
 SUBJECTIVE
 
      Patient Summary:    Ms. Villalobos is a 68-year-old female who has history of end-stage re
nal disease on hemodialysis 3 times a week, type 2 diabetes mellitus, hypertension, peripher
al arterial disease status post angioplasty of left lower extremity, coronary artery disease
 status post CABG, status post aortic valve replacement in 2018, heart failure with reduc
ed ejection fraction status post AICD placement, atrial fibrillation on apixaban developed i
nfection of left first metatarsal bone and underwent left transmetatarsal amputation yesterd
ay by Dr. Jordan.  During surgery, Dr. Jordan thought that patient has significant osteomy
elitis of foot, hence she was admitted under the care of hospitalist and is getting IV cefep
reyna with hemodialysis, recommendations of Dr. Asher.
 
      Events Overnight:  Patientvstates left foot pain is improving. Denies any other sympto
ms. She is being dialyzed today. 
 
 Scheduled Medications 
   apixaban  2.5 mg Oral BID 
   aspirin  81 mg Oral Daily with breakfast 
   atorvastatin  20 mg Oral Nightly 
   calcium acetate  667 mg Oral TID WC 
   carvedilol  3.125 mg Oral BID WC 
   cefepime  2 g Intravenous Q48H 
   clopidogrel  75 mg Oral Daily 
   furosemide  20 mg Oral Daily 
   insulin glargine  20 Units Subcutaneous Nightly 
   insulin lispro (human)  0-10 Units Subcutaneous TID AC 
   insulin lispro (human)  0-5 Units Subcutaneous Nightly 
   isosorbide mononitrate  60 mg Oral Daily 
   losartan  100 mg Oral Daily 
   nortriptyline  25 mg Oral Daily 
   nortriptyline  75 mg Oral Nightly 
   oxybutynin  2.5 mg Oral BID 
   pantoprazole  40 mg Oral QAM AC 
   rOPINIRole  0.75 mg Oral Nightly 
   Vortioxetine HBr  10 mg Oral Nightly 
 
 Continuous Infusions 
   dextrose   
 
 PRN Medications
 acetaminophen **OR** acetaminophen, albumin human, albuterol, dextrose, dextrose, dextrose,
 glucagon, glucagon, HYDROcodone-acetaminophen **OR** HYDROcodone-acetaminophen, loperamide,
 LORazepam, nitroGLYCERIN, ondansetron **OR** ondansetron, polyethylene glycol, prochlorpera
zine, saline lock IV - when tolerating PO fluids **AND** sodium chloride (PF)
 
 OBJECTIVE
 Vital Signs:
 /60  | Pulse 83  | Temp 98.3 F (36.8 C)  | Resp 18  | Ht 1.778 m (5' 10")  | Wt 6
8.7 kg (151 lb 7.3 oz)  | SpO2 99%  | BMI 21.73 kg/m 
 Temp:  [98.2 F (36.8 C)-99.7 F (37.6 C)] 98.3 F (36.8 C) ( 121)
 BP: ()/(47-63) 136/60 ( 1209)
 Heart Rate:  [68-84] 83 ( 121)
 
 Resp:  [16-18] 18 ( 121)
 SpO2:  [93 %-100 %] 99 % ( 121)
 I&O Last 3 Shifts:   1900 -  0659
 In: 2855 [P.O.:955]
 Out: 2700 
 
 Physical Exam 
 Constitutional: She is oriented to person, place, and time. She appears well-developed and 
well-nourished. No distress. 
 HENT: 
 Head: Normocephalic. 
 Mouth/Throat: Oropharynx is clear and moist. No oropharyngeal exudate. 
 Eyes: Pupils are equal, round, and reactive to light. Conjunctivae are normal. No scleral i
cterus. 
 Neck: Neck supple. No JVD present. 
 Cardiovascular: Normal rate, regular rhythm and normal heart sounds.  Exam reveals no antunez
p and no friction rub.  
 Pulmonary/Chest: Effort normal and breath sounds normal. No respiratory distress. She has n
o wheezes. She has no rales. She exhibits no tenderness. 
 Abdomina/Gl: Soft. Bowel sounds are normal. She exhibits no distension and no mass. There i
s no tenderness. There is no rebound and no guarding. No hernia. 
 Musculoskeletal: 
 Left foot covered with dressing. 
 No right pedal edema.  
 Neurological: She is alert and oriented to person, place, and time. No cranial nerve defici
t. 
 Skin: Skin is warm and dry. No rash noted. She is not diaphoretic. No erythema. There is pa
llor. 
 Psychiatric: She has a normal mood and affect. Her behavior is normal. Judgment and thought
 content normal. 
 Nursing note and vitals reviewed.
 
 DATA
 
 CBC:  
 Lab Results 
 Component Value Date 
  WBC 5.62 2018 
  RBC 2.57 (L) 2018 
  HGB 8.9 (L) 2018 
  HCT 27.0 (L) 2018 
  .1 (H) 2018 
  MCH 34.6 (H) 2018 
  MCHC 32.9 2018 
  RDW 63.0 (H) 2018 
   (L) 2018 
  MPV 8.4 2018 
  DIFFTYPE AUTOMATED 2018 
 
 CMP:  
 Lab Results 
 Component Value Date 
   2018 
  K 4.9 2018 
  CL 99 2018 
  CO2 23 2018 
  ANIONGAP 20 2018 
  GLUF 138 (H) 2018 
  BUN 34 (H) 2018 
  CREATININE 6.6 (H) 2018 
 
  BCR 5 2018 
  CA 8.7 2018 
  CA 8.7 2016 
  PROT 6.1 (L) 2018 
  ALB 2.2 (L) 2018 
  GLOB 3.9 2018 
  BILITOT 0.4 2018 
  ALP 82 2018 
  AST 16 2018 
  ALT 14 2018 
  EGFR 6 (L) 2018 
 
 X-ray Foot Left
 
 Result Date: 2018
 Amputation of the left forefoot at the level of the proximal metatarsals. Surgical cutaneou
s staples are present. No radiographic evidence for osteomyelitis. Normal alignment of the h
indfoot. Mild degeneration of the midfoot. Electronically signed by Oleksandr Lemus MD on 2018 10:12 AM
 
 PROBLEM LIST
 
 Principal Problem:
   Osteomyelitis of left foot (HCC)
 Active Problems:
   Depression
   ESRD (end stage renal disease) (HCC)
   Type 2 diabetes mellitus with chronic kidney disease on chronic dialysis, with long-term 
current use of insulin (HCC)
   Anemia in ESRD (end-stage renal disease) (formerly [facility])
   Hypertension, essential
   Anemia of chronic renal failure, stage 5 (HCC)
   Diabetic foot ulcer (HCC)
   Chronic systolic congestive heart failure (HCC)
   Chronic anticoagulation
   Cardiomyopathy (HCC)
   Paroxysmal atrial fibrillation (HCC)
   PVD (peripheral vascular disease) (HCC)
   CAD (coronary artery disease)
   PVD (peripheral vascular disease) (formerly [facility])
 
 Patient diagnosed with:  , and I agree with the following nutritional recommendations:
  
 
 ASSESSMENT & PLAN
 
 Osteomyelitis of left foot SP left TMA by Dr. Jordan on 18. Bone culture grew normal
 skin marzena. Patient is receiving cefepime with each HD per nephrology recommendations. Dr. Jordan to follow patient today.  ID consult from Dr. peguero appreciated. Will follow re
commendations regarding use of future antibiotic. 
 
 Phantom pain. Improved. 
 
 ESRD. On HD every T-T-S. Currently getting HD. Appreciate help from nephrology services. 
 
 Type II DM with complications. Most blood sugars have improved and last 4 readings are betw
een 164-125. . She is on Lantus and SSI. Last glycohemoglobin A1c was 7.5 on 18. Will 
continue current regimen of Lantus and Humalog. 
 
 CAD SP CABG, paroxysmal atrial fibrillation, HFrEF. All cardiac problems are under control.
 
 Will continue apixaban, carvedilol, aspirin, Plavix, losartan and Imdur. 
 
 History of PAD. SP left leg angioplasty. On Plavix and aspirin. 
 
 Anemia of chronic kidney disease. Management per nephrology services. 
 
 Discussed with Dr. Jordan today. She will  need SNF transfer upon discharge.
 
 Disposition:  Likely discharge to SNF on 18. 
 Code Status:  Prior
 
 Moiz MAN Josue MD
 2018Michael Peguero MD - 2018  9:20 AM PSTFormatting of this note may be d
ifferent from the original.
 Infectious Disease
 Progress Note
 
 Hospital Day:   LOS: 1 day 
 SUBJECTIVE
 No fever, no NV, no new skin rash
 
 Antibiotics:
 
 Allergy:
 Allergies 
 Allergen Reactions 
   Quinapril Hcl Anaphylaxis 
   Digoxin And Related Other (See Comments) 
   toxic 
   Metaxalone Other (See Comments) 
   Morphine Mental Changes 
   Make her crazy/ "funny feeling in my head"
 Has used hydromorphone in-house 
   Pantoprazole Sodium Nausea and Vomiting 
   Penicillins Rash 
   Quinapril Hcl Edema 
   Facial Edema 
   Sudafed [Pseudoephedrine Hcl] Rash 
 
 OBJECTIVE
 Vital Signs:
 BP 90/51  | Pulse 70  | Temp 98.2 F (36.8 C) (Oral)  | Resp 18  | Ht 1.778 m (5' 10")  
| Wt 68.7 kg (151 lb 7.3 oz)  | SpO2 97%  | BMI 21.73 kg/m 
 
 Examination:
 
 General: Patient is alert and oriented. Appears well-developed and nourished. 
 HEENT: 
 Head: Normocephalic and Atraumatic. 
 Nose: Nose normal. 
 Mouth/Throat: Oropharynx is clear and moist.
 Eyes: No conjunctiva injection. EOM Intact. PERRLA. No scleral icterus. 
 Neck: Neck supple. No JVD present. No tracheal deviation present. No thyromegaly 
 Cardiovascular: Regular rate and rhythm, no murmur heard and no friction rub.
 Pulmonary/Chest:  Symmetrical chest expansion, no stridor, no wheezes and no rales. 
 Abdominal: Soft, bowel sounds are present, no distension, no ascites. There is no rebound t
enderness and no guarding. 
 Musculoskeletal: L foot on dressing
 Neurological:  No focal weaness
 Skin: Skin is warm and dry. No rash noted. No erythema. No pallor. 
 
 Psychiatric: No psychosis, reported normal mood. Judgment normal. 
 
 LABS:
 Recent Results (from the past 24 hour(s)) 
 POCT glucose 
  Collection Time: 18 11:14 AM 
 Result Value Ref Range 
  GLUCOSE,POC SCREEN 235 (H) 65 - 99 mg/dL 
 POCT glucose 
  Collection Time: 18  4:31 PM 
 Result Value Ref Range 
  GLUCOSE,POC SCREEN 277 (H) 65 - 99 mg/dL 
 POCT glucose 
  Collection Time: 18  9:07 PM 
 Result Value Ref Range 
  GLUCOSE,POC SCREEN 164 (H) 65 - 99 mg/dL 
 POCT glucose 
  Collection Time: 18  5:09 AM 
 Result Value Ref Range 
  GLUCOSE,POC SCREEN 125 (H) 65 - 99 mg/dL 
 CBC W/Auto Diff (Reflex to Manual) 
  Collection Time: 18  5:50 AM 
 Result Value Ref Range 
  WBC 5.62 3.80 - 11.00 K/uL 
  RBC 2.57 (L) 3.70 - 5.10 M/uL 
  HGB 8.9 (L) 11.3 - 15.5 g/dL 
  HCT 27.0 (L) 34.0 - 46.0 % 
  .1 (H) 80.0 - 100.0 fl 
  MCH 34.6 (H) 27.0 - 34.0 pg 
  MCHC 32.9 32.0 - 35.5 g/dL 
  RDW SD 63.0 (H) 37 - 53 fl 
   (L) 150 - 400 K/uL 
  MPV 8.4 fl 
  DIFF TYPE AUTOMATED  
  NEUTROPHILS 76.00 % 
  LYMPHOCYTES 13.76 % 
  MONOCYTES 9.79 % 
  EOSINOPHILS 0.03 % 
  BASOPHILS 0.42 % 
  NEUTROPHILS ABS 4.27 1.90 - 7.40 K/uL 
  LYMPHOCYTES ABS 0.77 (L) 1.00 - 3.90 K/uL 
  MONOCYTES ABS 0.55 0.00 - 0.80 K/uL 
  EOSINOPHILS ABS 0.00 0.00 - 0.50 K/uL 
  BASOPHILS ABS 0.02 0.00 - 0.10 K/uL 
 Comprehensive metabolic panel 
  Collection Time: 18  5:50 AM 
 Result Value Ref Range 
  SODIUM 137 135 - 145 mmol/L 
  POTASSIUM 4.9 3.5 - 4.9 mmol/L 
  CHLORIDE 99 99 - 109 mmol/L 
  CO2 23 23 - 32 mmol/L 
  ANION GAP AGAP 20 5 - 20 mmol/L 
  GLUCOSE 138 (H) 65 - 99 mg/dL 
  BUN 34 (H) 8 - 25 mg/dL 
  CREATININE 6.6 (H) 0.50 - 1.00 mg/dL 
  BUN/CREAT 5  
  CALCIUM 8.7 8.5 - 10.5 mg/dL 
  TOTAL PROTEIN 6.1 (L) 6.3 - 8.2 g/dL 
  Albumin 2.2 (L) 3.3 - 4.8 g/dL 
  GLOBULIN 3.9 1.3 - 4.9 g/dL 
 
  A/G 0.6 (L) 1.0 - 2.4 
  TBIL 0.4 0.1 - 1.5 mg/dL 
  ALK PHOS 82 35 - 115 U/L 
  AST 16 10 - 45 U/L 
  ALT 14 10 - 65 U/L 
  EGFR 6 (L) >60 mL/min/1.73m2 
 
 Results  
  Procedure Component Value Units Date/Time 
  Anaerobic Culture W/Gram Stain [[id number]] Collected:  18 1320 
  Specimen:  Wound Updated:  18 
   Specimen Description WOUND 
   SPECIAL REQUESTS FIRST METATARSAL LEFT FOOT 
   CULTURE CULTURE IN PROGRESS 
  
 
 Diagnosis
 1. L forefoot chronic infection with osteomyelitis. Post L TMA 18. Wound culture and 
surgical path preliminary grew skin marzena. 
 2. Multiple co-morbidities : ESRD, on HD, PVD, CAD post CABG, DM.
 
 Recommendations
 1. Continue IV Cefepime 2 g post HD, 3 times per week
 2. Await final surgical culture
 3. Continue wound care 
 
  Michael Peguero MD
 2018
 
 Reyes, Diana M, RN - 2018  5:55 AM PSTChart check complete. Pt's breakfast ordered in
 anticipation of HD. Srinivasan Jordan DPM - 2018  5:07 PM PSTFormatting of this note 
may be different from the original.
 [facility]  
 Service:  Foot and Ankle Surgery (Podiatry)
 Progress Note
 
 Hospital Day:   LOS: 0 days 
 Post-Op Day:  1 Day Post-Op
 
 SUBJECTIVE
 
      Patient Summary:  DM female with ESRD on dialysis and PAD.  S/p revascularization by STEVEN Mclaughlin.  Patient had exposed bone when she was seen by me in the office, she was needing rem
oval of the infected bone, I recommended transmetatarsal amputation due to lesser toe deform
ities and the extent of osseous involvement.  Patient required admission postoperatively for
 physical therapy evaluation and treatment, dialysis, infectious disease evaluation due to i
ntraoperative purulence of the first metatarsal which was not completely resected and potent
ial case management for later transfer to skilled nursing facility
 
      Events Overnight:  S/p Trans-metatarsal amputation yesterday
 
 Patient denies any nausea, vomiting, fever, chills, shortness of breath, chest pain, or karen
f pain 
 
 Scheduled Medications 
   apixaban  2.5 mg Oral BID 
   aspirin  81 mg Oral Daily with breakfast 
   atorvastatin  20 mg Oral Nightly 
   calcium acetate  667 mg Oral TID WC 
   carvedilol  3.125 mg Oral BID WC 
 
   cefepime  2 g Intravenous Q48H 
   clopidogrel  75 mg Oral Daily 
   furosemide  20 mg Oral Daily 
   insulin glargine  20 Units Subcutaneous Nightly 
   insulin lispro (human)  0-10 Units Subcutaneous TID AC 
   insulin lispro (human)  0-5 Units Subcutaneous Nightly 
   isosorbide mononitrate  60 mg Oral Daily 
   losartan  100 mg Oral Daily 
   nortriptyline  25 mg Oral Daily 
   nortriptyline  75 mg Oral Nightly 
   oxybutynin  2.5 mg Oral BID 
   pantoprazole  40 mg Oral QAM AC 
   rOPINIRole  0.75 mg Oral Nightly 
   Vortioxetine HBr  10 mg Oral Nightly 
 
 Continuous Infusions 
   dextrose   
 
 PRN Medications
 acetaminophen **OR** acetaminophen, albuterol, dextrose, dextrose, dextrose, glucagon, gluc
agon, HYDROcodone-acetaminophen **OR** HYDROcodone-acetaminophen, loperamide, LORazepam, nit
roGLYCERIN, ondansetron **OR** ondansetron, polyethylene glycol, prochlorperazine, saline lo
ck IV - when tolerating PO fluids **AND** sodium chloride (PF)
 
 OBJECTIVE
 Vital Signs:
 /54  | Pulse 76  | Temp 98.9 F (37.2 C) (Oral)  | Resp 16  | Ht 1.778 m (5' 10") 
 | Wt 68.7 kg (151 lb 7.3 oz)  | SpO2 100%  | BMI 21.73 kg/m 
 Temp:  [97.9 F (36.6 C)-98.9 F (37.2 C)] 98.9 F (37.2 C) ( 1549)
 BP: (107-135)/(53-63) 107/54 ( 1642)
 Heart Rate:  [68-80] 76 ( 1642)
 Resp:  [16-20] 16 (1549)
 SpO2:  [96 %-100 %] 100 % (1549)
 Weight:  [68.7 kg (151 lb 7.3 oz)] 68.7 kg (151 lb 7.3 oz) (2030)
 
 Constitutional: The patient is alert and oriented to person, place and time
 Psychiatric: The patient has normal affect and mood; her speech is normal
 Respiratory: Effort normal and breath sounds normal. No accessory muscle usage
 Eyes: pupils equal, round, and react to light, no strabismus
 
 Focused Bilateral Lower Extremity Examination:
 Vascular: 
 Lower extremities appear perfuse, CFT brisk to expose skin
 Splint left intact as there is no strikethrough bleeding on dressings
 
 Neurological: 
 Diminished sensations to light touch to the toes of the right foot, normal sensation to lig
ht touch to the left knee 
 
 Dermatological/Lymph:
 Splint intact left lower extremity, no strikethrough bleeding
 
 Musculoskeletal/Orthopaedic: 
 Absent forefoot left
 Normal muscle strength other extremities
 
 Imaging: 
 Xr Chest 2 View
 
 Result Date: 2018
 
 1.  Mild diffuse pulmonary edema, similar to the prior exam. 2.  Left perihilar airspace op
acity which may represent edema. Recommend repeat chest x-ray in 6-8 weeks to verify resolut
ion. 3.  Small left pleural effusion and/or pleural scarring, with adjacent atelectasis or c
onsolidation, similar to the prior exam. Small right pleural effusion. 4.  Peripheral opacit
y within the left lower hemithorax, slightly increased which is questionable for a loculated
 pleural fluid, parenchymal opacity, or artifact. Electronically signed by Chau Garcia on 1
2018 5:33 PM
 
 X-ray Foot Left
 
 Result Date: 2018
 Amputation of the left forefoot at the level of the proximal metatarsals. Surgical cutaneou
s staples are present. No radiographic evidence for osteomyelitis. Normal alignment of the h
indfoot. Mild degeneration of the midfoot. Electronically signed by Oleksandr Lemus MD on 2018 10:12 AM
 
 Nm Myocardial Perfusion Spect (stress And Rest)
 
 Result Date: 2018
 1.  Mild to moderate left ventricular dilatation, unchanged between rest and stress. 2.  I 
cannot exclude mid anterior wall infarct. 3.  I see no evidence of ischemia. 4.  Global mode
rate hypokinesis. 5.  LVEF 29% stress, 33% rest. Using the risk stratification system at our
 institution, this examination equates to at least a high risk based on this imaging, bri cm from the criteria below (1). Note that this should be interfaced with clinical and other d
patrizia, potentially affecting final categorization. American Heart Association and American Col
lege of Cardiology Scientific Statement. Circulation (2008); 118: p 5949-2442 Electronically
 signed by Wilfrido Knight MD on 2018 4:34 PM
 
 Ir Angiogram Extremity Left
 
 Result Date: 2018
 1. Aorta with narrow aortic bifurcation with moderate stenosis of proximal left common errol
c artery. 2. Left SFA with moderate stenosis proximally. 3. Single vessel runoff to left jeanne
t via left anterior tibial artery. 4. Left posterior tibial artery and peroneal artery were 
occluded with reconstitution of distal posterior tibial artery at the left ankle via collate
rals. 5. If forefoot amputation does not heal, may need popliteal to posterior tibial artery
 bypass versus antegrade access of left common femoral artery with posterior tibial artery r
ecanalization. Electronically signed by Kirt Mclaughlin MD on 2018 10:51 AM
 
 Xr Chest 1 View
 
 Result Date: 2018
 1.  Minimal interstitial edema with tiny bilateral pleural effusions. 2.  Mild left basilar
 opacity to likely represent subsegmental atelectasis and/or airspace disease. Electronicall
y signed by Jason Torres DO on 2018 3:50 PM
 
 Us Lower Extremity Arterial Left
 
 Result Date: 2018
 1.  Greater than 50% stenosis of the mid superficial femoral artery. 2.  Monophasic single 
runoff to the ankle via the anterior tibial artery. Electronically signed by Jason Torres DO on 2018 1:52 PM
 
 Echo Cardiac Adult With Contrast
 
 Result Date: 2018
 1. Overall left ventricular systolic function is severely impaired with, an EF between 20 -
 25 %. 2. There is mild aortic regurgitation. 3. There is mild to  moderate aortic stenosis 
present. 4. Mild-to-moderate mitral regurgitation is present. 5. There is moderate pulmonary
 hypertension.
 
 
 PROBLEM LIST
 Principal Problem:
   Osteomyelitis of left foot (HCC)
 Active Problems:
   Depression
   ESRD (end stage renal disease) (HCC)
   Type 2 diabetes mellitus with chronic kidney disease on chronic dialysis, with long-term 
current use of insulin (formerly [facility])
   Anemia in ESRD (end-stage renal disease) (HCC)
   Hypertension, essential
   Diabetic foot ulcer (HCC)
   Chronic systolic congestive heart failure (HCC)
   Chronic anticoagulation
   Paroxysmal atrial fibrillation (HCC)
   PVD (peripheral vascular disease) (HCC)
   CAD (coronary artery disease)
 
 ASSESSMENT & PLAN
 S/p: Transmetatarsal amputation of the left foot with co morbidities of diabetes, end-stage
 renal disease, osteomyelitis left first metatarsal
 Date of surgery: 2018
 Post op day: 1
 
 New data: Surgical pathology report for first metatarsal margin pending, intraoperative cul
tures pending
 Dressing changes: None, leave dressing clean dry and intact, please call me for any striket
hrough bleeding and reinforce dressing
 Weight bearing status: Strict nonweightbearing to the left foot or ankle
 Physical therapy to see
 Anticipate patient will need skilled nursing facility stay postoperatively to assure that s
he remains nonweightbearing to allow stump to heal
 Recommend 6 weeks of antibiotic therapy given intraoperative purulence intramedullary to fi
rst metatarsal which was incompletely resected
 F/u in 10-14 days in my office, appt already scheduled 
 
 Code Status:  Prior
 
 Srinivasan Jordan DPM
 2018
 Moiz Josue MD - 2018 12:29 PM PSTFormatting of this note may be different
 from the original.
 [facility]  
 Service:  Hospitalist
 Progress Note
 
 Hospital Day:   LOS: 0 days 
 Post-Op Day:  1 Day Post-Op
 SUBJECTIVE
 
      Patient Summary:    Ms. Villalobos is a 68-year-old female who has history of end-stage re
nal disease on hemodialysis 3 times a week, type 2 diabetes mellitus, hypertension, peripher
al arterial disease status post angioplasty of left lower extremity, coronary artery disease
 status post CABG, status post aortic valve replacement in 2018, heart failure with reduc
ed ejection fraction status post AICD placement, atrial fibrillation on apixaban developed i
nfection of left first metatarsal bone and underwent left transmetatarsal amputation yesterd
ay by Dr. Jordan.  During surgery, Dr. Jordan thought that patient has significant osteomy
elitis of foot, hence she was admitted under the care of hospitalist and is getting IV cefep
reyna with hemodialysis, recommendations of Dr. Asher.
 
 
      Events Overnight:  Patient C/O left foot pain and states she feels as her left toes ar
e hurting though she had TMA yesterday. Denies any other symptoms. She was dialyzed yesterda
y. 
 
 Scheduled Medications 
   apixaban  2.5 mg Oral BID 
   aspirin  81 mg Oral Daily with breakfast 
   atorvastatin  20 mg Oral Nightly 
   calcium acetate  667 mg Oral TID WC 
   carvedilol  3.125 mg Oral BID WC 
   cefepime  2 g Intravenous Q48H 
   clopidogrel  75 mg Oral Daily 
   furosemide  20 mg Oral Daily 
   insulin glargine  20 Units Subcutaneous Nightly 
   insulin lispro (human)  0-10 Units Subcutaneous TID AC 
   insulin lispro (human)  0-5 Units Subcutaneous Nightly 
   isosorbide mononitrate  60 mg Oral Daily 
   losartan  100 mg Oral Daily 
   nortriptyline  25 mg Oral Daily 
   nortriptyline  75 mg Oral Nightly 
   oxybutynin  2.5 mg Oral BID 
   pantoprazole  40 mg Oral QAM AC 
   rOPINIRole  0.75 mg Oral Nightly 
   Vortioxetine HBr  10 mg Oral Nightly 
 
 Continuous Infusions 
   dextrose   
 
 PRN Medications
 acetaminophen **OR** acetaminophen, albuterol, dextrose, dextrose, dextrose, glucagon, gluc
agon, HYDROcodone-acetaminophen **OR** HYDROcodone-acetaminophen, loperamide, LORazepam, nit
roGLYCERIN, ondansetron **OR** ondansetron, polyethylene glycol, prochlorperazine, saline lo
ck IV - when tolerating PO fluids **AND** sodium chloride (PF)
 
 OBJECTIVE
 Vital Signs:
 /53 (BP Location: Right upper arm)  | Pulse 71  | Temp 98.4 F (36.9 C) (Oral)  | 
Resp 18  | Ht 1.778 m (5' 10")  | Wt 68.7 kg (151 lb 7.3 oz)  | SpO2 100%  | BMI 21.73 kg/m
 
 Temp:  [97.2 F (36.2 C)-98.9 F (37.2 C)] 98.4 F (36.9 C) (1110)
 BP: ()/(44-70) 108/53 ( 111)
 Heart Rate:  [67-80] 71 (1110)
 Resp:  [9-22] 18 (1110)
 SpO2:  [94 %-100 %] 100 % (1110)
 Weight:  [68.7 kg (151 lb 7.3 oz)] 68.7 kg (151 lb 7.3 oz) (2030)
 I&O Last 3 Shifts:   1900 -  0659
 In: 2500 [I.V.:600]
 Out: 2700 
 
 Physical Exam 
 Constitutional: She is oriented to person, place, and time. She appears well-developed and 
well-nourished. No distress. 
 HENT: 
 Head: Normocephalic. 
 Mouth/Throat: Oropharynx is clear and moist. No oropharyngeal exudate. 
 Eyes: Pupils are equal, round, and reactive to light. Conjunctivae are normal. No scleral i
cterus. 
 Neck: Neck supple. No JVD present. 
 Cardiovascular: Normal rate, regular rhythm and normal heart sounds.  Exam reveals no antunez
p and no friction rub.  
 
 Pulmonary/Chest: Effort normal and breath sounds normal. No respiratory distress. She has n
o wheezes. She has no rales. She exhibits no tenderness. 
 Abdomina/Gl: Soft. Bowel sounds are normal. She exhibits no distension and no mass. There i
s no tenderness. There is no rebound and no guarding. No hernia. 
 Musculoskeletal: 
 Left foot covered with dressing. 
 No right pedal edema.  
 Neurological: She is alert and oriented to person, place, and time. No cranial nerve defici
t. 
 Skin: Skin is warm and dry. No rash noted. She is not diaphoretic. No erythema. There is pa
llor. 
 Psychiatric: She has a normal mood and affect. Her behavior is normal. Judgment and thought
 content normal. 
 
 DATA
 
 CBC:  
 Lab Results 
 Component Value Date 
  WBC 5.94 2018 
  RBC 2.73 (L) 2018 
  HGB 9.6 (L) 2018 
  HCT 28.3 (L) 2018 
  .5 (H) 2018 
  MCH 35.0 (H) 2018 
  MCHC 33.8 2018 
  RDW 63.4 (H) 2018 
   2018 
  MPV 8.0 2018 
  DIFFTYPE AUTOMATED 2018 
 
 CMP:  
 Lab Results 
 Component Value Date 
   (L) 2018 
  K 4.8 2018 
  CL 99 2018 
  CO2 26 2018 
  ANIONGAP 14 2018 
  GLUF 222 (H) 2018 
  BUN 22 2018 
  CREATININE 4.6 (H) 2018 
  BCR 5 2018 
  CA 8.5 2018 
  CA 8.7 2016 
  PROT 6.1 (L) 2018 
  ALB 2.4 (L) 2018 
  GLOB 3.7 2018 
  BILITOT 0.4 2018 
  ALP 95 2018 
  AST 23 2018 
  ALT 16 2018 
  EGFR 9 (L) 2018 
 
 X-ray Foot Left
 
 Result Date: 2018
 Amputation of the left forefoot at the level of the proximal metatarsals. Surgical cutaneou
s staples are present. No radiographic evidence for osteomyelitis. Normal alignment of the h
indfoot. Mild degeneration of the midfoot. Electronically signed by Oleksandr Lemus MD on 2018 10:12 AM
 
 PROBLEM LIST
 
 Principal Problem:
   Osteomyelitis of left foot (HCC)
 Active Problems:
   Depression
   ESRD (end stage renal disease) (HCC)
   Type 2 diabetes mellitus with chronic kidney disease on chronic dialysis, with long-term 
current use of insulin (HCC)
   Anemia in ESRD (end-stage renal disease) (formerly [facility])
   Hypertension, essential
   Diabetic foot ulcer (HCC)
   Chronic systolic congestive heart failure (HCC)
   Chronic anticoagulation
   Paroxysmal atrial fibrillation (HCC)
   PVD (peripheral vascular disease) (HCC)
   CAD (coronary artery disease)
 
 Patient diagnosed with:  , and I agree with the following nutritional recommendations:
  
 
 ASSESSMENT & PLAN
 
 Osteomyelitis of left foot SP left TMA by Dr. Jordan yesterday. Await bone culture. Patien
t is receiving cefepime with each HD per nephrology recommendations. Dr. Jordan to follow p
atient today. Will lorrie ID consult. Patient follows Dr. Andrés Elliott. 
 
 Phantom pain. May need gabapentin or Lyrica. 
 
 ESRD. On HD every T-T-S. Next HD tomorrow. Appreciate help from nephrology services. 
 
 Type II DM with complications. Most blood sugars are above 200. She is on Lantus and SSI. L
ast glycohemoglobin A1c was 7.5 on 18. Will add scheduled humalog and may increase the
 dose of Lantus by tomorrow. 
 
 CAD SP CABG, paroxysmal atrial fibrillation, HFrEF. All cardiac problems are under control.
 Will continue apixaban, carvedilol, aspirin, Plavix, losartan and Imdur. 
 
 History of PAD. SP left leg angioplasty. On Plavix and aspirin. 
 
 Anemia of chronic kidney disease. Management per nephrology services. 
 
 Discussed with Dr. Jordan on phone. Patient will remain in the hospital for few days for I
V antibiotics. She may also need SNF transfer upon discharge. Hence will change outpatient s
tatus to inpatient. 
 
 Disposition:  
 Code Status:  Prior
 
 Moiz Josue MD
 2018Pretty Onofre, PT - 2018  8:30 AM PSTJuno Bran PT:  DAILY FOREVER!   :
-)
 Every commercial- move Read- every 5pg-move:
  
 Lying in bed:  go slowly!
 1)*Ankle pumps; 9)*Push heels down;
 2)*Squeeze bottom/gluts/bladder;
 3)*Push back of knees into bed/straight knees;
 
 4) Straight leg up and down;   10) Scoot R/L;
 5) Straight leg-slide out to side/back;
 6) Heel slide- bring knee up/down to chest;
 7) Jelly fish hands/feet- toe ; 
 8) Straight arms-lift high-w/out pain, breath deep; 
 * "1000-a -day"
 Both knees bent: R-only1) push bottom up/slight bridge(one knee bent-like one leg roll); 2)
 Rock knees R/L; 3) Bridge; 4) Rock baby; 5) Alternate reach -straight arms; 6) Chin tuck
 
 Sittin) Tap toes; 10) ABC's; 
 2) Straighten knee and bend/kick ball;
 3) Lift knee/march;
 4) Step knee/foot out to side and back;
 5) Squeeze bottom; 
 6) Straight arms-lift high w/out pain- deep breath; 
 7) Sit to stand 5x; 8) Scoot R/L B/F; 
 9) Backward shoulder rolls;
                  NEVER GO THROUGH PAIN!!!
 
          Start slowly!!!      Be safe!!!
 Standing- at counter: R/not yets-once WBAT
 1) Toe/heel raises; not yet
 2) March- lift knee; L-only
 3) Straight leg back;L-only
 4) Straight leg out to side; L-only
 5) Bend knee-kick self; L-only
 6) Push ups; not yet
 7) Back to wall-bend knees-flatten back-not yet
 Walking(Forward/Backward): not yet
 1) Side step;                                  
 2) Toe walk-Heel walk;                 
 3) High knee march;                      
 4) Toe to heel walk;                       
 Balance(Eyes Open/Close-Foam): not yet
 1) Feet together;
 2) Toe to instep;
 3) Heel to Toe;
 4) Single foot stand;
 "*1000-a-day"
 Lying on back
 1) Ankle pumps; 2) Squeeze bottom; 3) Push knees into bed; 4) Straight leg up; 5) Straight 
leg out; 6) Heel slide;
 Both knees bent: R-only1) push bottom up/slight bridge(one knee bent-like one leg roll); 2)
 Rock knees R/L; 3) Bridge; 4) Rock baby; 5) Alternate reach -straight arms; 6) Chin Kyler Morse, [facility] - 2018  4:26 PM PSTRenal Dosing Monitoring:
 S: Renal dose monitoring per protocol. 
 O: 
  HD patient
 A:  No adjustments needed at this time. 
 P:  Pharmacy will continue monitoring patient for appropriate dosing based on renal functio
n.
 
 Pharmacist: Kyler Tyler, PharmD
 in this encounter
 
 Plan of Treatment
 
 
 
+--------+---------+-----------+----------------------+-------------+
| Date   | Type    | Specialty | Care Team            | Description |
+--------+---------+-----------+----------------------+-------------+
| 04/10/ | Office  | Podiatry  |   Srinivasan Jordan,  |             |
| 2019   | Visit   |           | DPM  [address], |             |
|        |         |           |  [address]  [place],  |             |
|        |         |           | [place] [address]             |             |
|        |         |           | [phone]         |             |
|        |         |           | [phone] (Fax)   |             |
+--------+---------+-----------+----------------------+-------------+
 as of this encounter
 
 Procedures
 
 
+----------------------+--------+-------------+----------------------+----------------------
+
| Procedure Name       | Priori | Date/Time   | Associated Diagnosis | Comments             
|
|                      | ty     |             |                      |                      
|
+----------------------+--------+-------------+----------------------+----------------------
+
| POCT GLUCOSE         | Routin | 2019  |                      |   Results for this   
|
|                      | e      | 11:23 AM    |                      | procedure are in the 
|
|                      |        | PST         |                      |  results section.    
|
+----------------------+--------+-------------+----------------------+----------------------
+
| POCT GLUCOSE         | Routin | 2019  |                      |   Results for this   
|
|                      | e      |  5:39 AM    |                      | procedure are in the 
|
|                      |        | PST         |                      |  results section.    
|
+----------------------+--------+-------------+----------------------+----------------------
+
| CBC W/AUTO DIFF      | Routin | 2019  |                      |   Results for this   
|
| (REFLEX TO MANUAL)   | e      |  4:58 AM    |                      | procedure are in the 
|
|                      |        | PST         |                      |  results section.    
|
+----------------------+--------+-------------+----------------------+----------------------
+
| COMPREHENSIVE        | Routin | 2019  |                      |   Results for this   
|
| METABOLIC PANEL      | e      |  4:58 AM    |                      | procedure are in the 
|
|                      |        | PST         |                      |  results section.    
|
+----------------------+--------+-------------+----------------------+----------------------
+
| POCT GLUCOSE         | Routin | 2019  |                      |   Results for this   
|
|                      | e      |  8:58 PM    |                      | procedure are in the 
|
|                      |        | PST         |                      |  results section.    
 
|
+----------------------+--------+-------------+----------------------+----------------------
+
| POCT GLUCOSE         | Routin | 2019  |                      |   Results for this   
|
|                      | e      |  4:09 PM    |                      | procedure are in the 
|
|                      |        | PST         |                      |  results section.    
|
+----------------------+--------+-------------+----------------------+----------------------
+
| POCT GLUCOSE         | Routin | 2019  |                      |   Results for this   
|
|                      | e      | 11:00 AM    |                      | procedure are in the 
|
|                      |        | PST         |                      |  results section.    
|
+----------------------+--------+-------------+----------------------+----------------------
+
| POCT GLUCOSE         | Routin | 2019  |                      |   Results for this   
|
|                      | e      |  5:38 AM    |                      | procedure are in the 
|
|                      |        | PST         |                      |  results section.    
|
+----------------------+--------+-------------+----------------------+----------------------
+
| CBC W/AUTO DIFF      | Routin | 2019  |                      |   Results for this   
|
| (REFLEX TO MANUAL)   | e      |  5:25 AM    |                      | procedure are in the 
|
|                      |        | PST         |                      |  results section.    
|
+----------------------+--------+-------------+----------------------+----------------------
+
| COMPREHENSIVE        | Routin | 2019  |                      |   Results for this   
|
| METABOLIC PANEL      | e      |  5:25 AM    |                      | procedure are in the 
|
|                      |        | PST         |                      |  results section.    
|
+----------------------+--------+-------------+----------------------+----------------------
+
| POCT GLUCOSE         | Routin | 2019  |                      |   Results for this   
|
|                      | e      |  9:38 PM    |                      | procedure are in the 
|
|                      |        | PST         |                      |  results section.    
|
+----------------------+--------+-------------+----------------------+----------------------
+
| POCT GLUCOSE         | Routin | 2019  |                      |   Results for this   
|
|                      | e      |  4:35 PM    |                      | procedure are in the 
|
|                      |        | PST         |                      |  results section.    
|
+----------------------+--------+-------------+----------------------+----------------------
+
| POCT GLUCOSE         | Routin | 2019  |                      |   Results for this   
 
|
|                      | e      | 11:37 AM    |                      | procedure are in the 
|
|                      |        | PST         |                      |  results section.    
|
+----------------------+--------+-------------+----------------------+----------------------
+
| EEG                  | Today  | 2019  |                      |   Results for this   
|
|                      |        |  7:55 AM    |                      | procedure are in the 
|
|                      |        | PST         |                      |  results section.    
|
+----------------------+--------+-------------+----------------------+----------------------
+
| CBC W/AUTO DIFF      | Routin | 2019  |                      |   Results for this   
|
| (REFLEX TO MANUAL)   | e      |  5:50 AM    |                      | procedure are in the 
|
|                      |        | PST         |                      |  results section.    
|
+----------------------+--------+-------------+----------------------+----------------------
+
| COMPREHENSIVE        | Routin | 2019  |                      |   Results for this   
|
| METABOLIC PANEL      | e      |  5:50 AM    |                      | procedure are in the 
|
|                      |        | PST         |                      |  results section.    
|
+----------------------+--------+-------------+----------------------+----------------------
+
| POCT GLUCOSE         | Routin | 2019  |                      |   Results for this   
|
|                      | e      |  5:45 AM    |                      | procedure are in the 
|
|                      |        | PST         |                      |  results section.    
|
+----------------------+--------+-------------+----------------------+----------------------
+
| TROPONIN I           | Timed  | 2019  |                      |   Results for this   
|
|                      |        | 12:45 AM    |                      | procedure are in the 
|
|                      |        | PST         |                      |  results section.    
|
+----------------------+--------+-------------+----------------------+----------------------
+
| TROPONIN I           | Timed  | 2019  |                      |   Results for this   
|
|                      |        |  9:10 PM    |                      | procedure are in the 
|
|                      |        | PST         |                      |  results section.    
|
+----------------------+--------+-------------+----------------------+----------------------
+
| POCT GLUCOSE         | Routin | 2019  |                      |   Results for this   
|
|                      | e      |  9:08 PM    |                      | procedure are in the 
|
|                      |        | PST         |                      |  results section.    
 
|
+----------------------+--------+-------------+----------------------+----------------------
+
| XR CHEST 1 VIEW      | STAT   | 2019  |                      |   Results for this   
|
|                      |        |  4:43 PM    |                      | procedure are in the 
|
|                      |        | PST         |                      |  results section.    
|
+----------------------+--------+-------------+----------------------+----------------------
+
| EKG STANDARD 12 LEAD | STAT   | 2019  |                      |   Results for this   
|
|                      |        |  4:30 PM    |                      | procedure are in the 
|
|                      |        | PST         |                      |  results section.    
|
+----------------------+--------+-------------+----------------------+----------------------
+
| CK MB                | Add-On | 2019  |                      |   Results for this   
|
|                      |        |  4:10 PM    |                      | procedure are in the 
|
|                      |        | PST         |                      |  results section.    
|
+----------------------+--------+-------------+----------------------+----------------------
+
| TROPONIN I           | STAT   | 2019  |                      |   Results for this   
|
|                      |        |  4:10 PM    |                      | procedure are in the 
|
|                      |        | PST         |                      |  results section.    
|
+----------------------+--------+-------------+----------------------+----------------------
+
| CK                   | Add-On | 2019  |                      |   Results for this   
|
|                      |        |  4:10 PM    |                      | procedure are in the 
|
|                      |        | PST         |                      |  results section.    
|
+----------------------+--------+-------------+----------------------+----------------------
+
| POCT GLUCOSE         | Routin | 2019  |                      |   Results for this   
|
|                      | e      |  3:42 PM    |                      | procedure are in the 
|
|                      |        | PST         |                      |  results section.    
|
+----------------------+--------+-------------+----------------------+----------------------
+
| POCT GLUCOSE         | Routin | 2019  |                      |   Results for this   
|
|                      | e      | 11:44 AM    |                      | procedure are in the 
|
|                      |        | PST         |                      |  results section.    
|
+----------------------+--------+-------------+----------------------+----------------------
+
| POCT GLUCOSE         | Routin | 2019  |                      |   Results for this   
 
|
|                      | e      |  5:55 AM    |                      | procedure are in the 
|
|                      |        | PST         |                      |  results section.    
|
+----------------------+--------+-------------+----------------------+----------------------
+
| SEDIMENTATION RATE,  | Routin | 2019  |                      |   Results for this   
|
| AUTOMATED            | e - AM |  5:32 AM    |                      | procedure are in the 
|
|                      |        | PST         |                      |  results section.    
|
+----------------------+--------+-------------+----------------------+----------------------
+
| CBC W/AUTO DIFF      | Routin | 2019  |                      |   Results for this   
|
| (REFLEX TO MANUAL)   | e      |  5:32 AM    |                      | procedure are in the 
|
|                      |        | PST         |                      |  results section.    
|
+----------------------+--------+-------------+----------------------+----------------------
+
| CBC W/MANUAL DIFF    | Routin | 2019  |                      |   Results for this   
|
|                      | e - AM |  5:32 AM    |                      | procedure are in the 
|
|                      |        | PST         |                      |  results section.    
|
+----------------------+--------+-------------+----------------------+----------------------
+
| C-REACTIVE PROTEIN   | Routin | 2019  |                      |   Results for this   
|
|                      | e - AM |  5:32 AM    |                      | procedure are in the 
|
|                      |        | PST         |                      |  results section.    
|
+----------------------+--------+-------------+----------------------+----------------------
+
| CK                   | Routin | 2019  |                      |   Results for this   
|
|                      | e - AM |  5:32 AM    |                      | procedure are in the 
|
|                      |        | PST         |                      |  results section.    
|
+----------------------+--------+-------------+----------------------+----------------------
+
| COMPREHENSIVE        | Routin | 2019  |                      |   Results for this   
|
| METABOLIC PANEL      | e      |  5:32 AM    |                      | procedure are in the 
|
|                      |        | PST         |                      |  results section.    
|
+----------------------+--------+-------------+----------------------+----------------------
+
| POCT GLUCOSE         | Routin | 2018  |                      |   Results for this   
|
|                      | e      |  9:04 PM    |                      | procedure are in the 
|
|                      |        | PST         |                      |  results section.    
 
|
+----------------------+--------+-------------+----------------------+----------------------
+
| CT HEAD WO CONTRAST  | Routin | 2018  |                      |   Results for this   
|
|                      | e      |  5:23 PM    |                      | procedure are in the 
|
|                      |        | PST         |                      |  results section.    
|
+----------------------+--------+-------------+----------------------+----------------------
+
| POCT GLUCOSE         | Routin | 2018  |                      |   Results for this   
|
|                      | e      |  4:34 PM    |                      | procedure are in the 
|
|                      |        | PST         |                      |  results section.    
|
+----------------------+--------+-------------+----------------------+----------------------
+
| POCT GLUCOSE         | Routin | 2018  |                      |   Results for this   
|
|                      | e      | 11:17 AM    |                      | procedure are in the 
|
|                      |        | PST         |                      |  results section.    
|
+----------------------+--------+-------------+----------------------+----------------------
+
| POCT GLUCOSE         | Routin | 2018  |                      |   Results for this   
|
|                      | e      |  6:26 AM    |                      | procedure are in the 
|
|                      |        | PST         |                      |  results section.    
|
+----------------------+--------+-------------+----------------------+----------------------
+
| VANCOMYCIN, RANDOM   | Routin | 2018  |                      |   Results for this   
|
|                      | e - AM |  6:17 AM    |                      | procedure are in the 
|
|                      |        | PST         |                      |  results section.    
|
+----------------------+--------+-------------+----------------------+----------------------
+
| POCT GLUCOSE         | Routin | 2018  |                      |   Results for this   
|
|                      | e      |  5:37 AM    |                      | procedure are in the 
|
|                      |        | PST         |                      |  results section.    
|
+----------------------+--------+-------------+----------------------+----------------------
+
| POCT GLUCOSE         | Routin | 2018  |                      |   Results for this   
|
|                      | e      |  9:10 PM    |                      | procedure are in the 
|
|                      |        | PST         |                      |  results section.    
|
+----------------------+--------+-------------+----------------------+----------------------
+
| POCT GLUCOSE         | Routin | 2018  |                      |   Results for this   
 
|
|                      | e      |  4:07 PM    |                      | procedure are in the 
|
|                      |        | PST         |                      |  results section.    
|
+----------------------+--------+-------------+----------------------+----------------------
+
| POCT GLUCOSE         | Routin | 2018  |                      |   Results for this   
|
|                      | e      | 10:57 AM    |                      | procedure are in the 
|
|                      |        | PST         |                      |  results section.    
|
+----------------------+--------+-------------+----------------------+----------------------
+
| CBC W/AUTO DIFF      | ASAP   | 2018  |                      |   Results for this   
|
| (REFLEX TO MANUAL)   |        |  5:47 AM    |                      | procedure are in the 
|
|                      |        | PST         |                      |  results section.    
|
+----------------------+--------+-------------+----------------------+----------------------
+
| COMPREHENSIVE        | ASAP   | 2018  |                      |   Results for this   
|
| METABOLIC PANEL      |        |  5:47 AM    |                      | procedure are in the 
|
|                      |        | PST         |                      |  results section.    
|
+----------------------+--------+-------------+----------------------+----------------------
+
| POCT GLUCOSE         | Routin | 2018  |                      |   Results for this   
|
|                      | e      |  5:15 AM    |                      | procedure are in the 
|
|                      |        | PST         |                      |  results section.    
|
+----------------------+--------+-------------+----------------------+----------------------
+
| POCT GLUCOSE         | Routin | 2018  |                      |   Results for this   
|
|                      | e      |  8:29 PM    |                      | procedure are in the 
|
|                      |        | PST         |                      |  results section.    
|
+----------------------+--------+-------------+----------------------+----------------------
+
| POCT GLUCOSE         | Routin | 2018  |                      |   Results for this   
|
|                      | e      |  4:44 PM    |                      | procedure are in the 
|
|                      |        | PST         |                      |  results section.    
|
+----------------------+--------+-------------+----------------------+----------------------
+
| POCT GLUCOSE         | Routin | 2018  |                      |   Results for this   
|
|                      | e      | 11:38 AM    |                      | procedure are in the 
|
|                      |        | PST         |                      |  results section.    
 
|
+----------------------+--------+-------------+----------------------+----------------------
+
| CBC W/AUTO DIFF      | Routin | 2018  |                      |   Results for this   
|
| (REFLEX TO MANUAL)   | e - AM |  5:50 AM    |                      | procedure are in the 
|
|                      |        | PST         |                      |  results section.    
|
+----------------------+--------+-------------+----------------------+----------------------
+
| COMPREHENSIVE        | Routin | 2018  |                      |   Results for this   
|
| METABOLIC PANEL      | e - AM |  5:50 AM    |                      | procedure are in the 
|
|                      |        | PST         |                      |  results section.    
|
+----------------------+--------+-------------+----------------------+----------------------
+
| POCT GLUCOSE         | Routin | 2018  |                      |   Results for this   
|
|                      | e      |  5:09 AM    |                      | procedure are in the 
|
|                      |        | PST         |                      |  results section.    
|
+----------------------+--------+-------------+----------------------+----------------------
+
| POCT GLUCOSE         | Routin | 2018  |                      |   Results for this   
|
|                      | e      |  9:07 PM    |                      | procedure are in the 
|
|                      |        | PST         |                      |  results section.    
|
+----------------------+--------+-------------+----------------------+----------------------
+
| POCT GLUCOSE         | Routin | 2018  |                      |   Results for this   
|
|                      | e      |  4:31 PM    |                      | procedure are in the 
|
|                      |        | PST         |                      |  results section.    
|
+----------------------+--------+-------------+----------------------+----------------------
+
| POCT GLUCOSE         | Routin | 2018  |                      |   Results for this   
|
|                      | e      | 11:14 AM    |                      | procedure are in the 
|
|                      |        | PST         |                      |  results section.    
|
+----------------------+--------+-------------+----------------------+----------------------
+
| XR FOOT LEFT         | Routin | 2018  |                      |   Results for this   
|
|                      | e      |  9:54 AM    |                      | procedure are in the 
|
|                      |        | PST         |                      |  results section.    
|
+----------------------+--------+-------------+----------------------+----------------------
+
| PATHOLOGY HISTOLOGY  | Routin | 2018  |                      |   Results for this   
 
|
| - TISSUE             | e      |  8:00 AM    |                      | procedure are in the 
|
|                      |        | PST         |                      |  results section.    
|
+----------------------+--------+-------------+----------------------+----------------------
+
| CBC W/AUTO DIFF      | ASAP   | 2018  |                      |   Results for this   
|
| (REFLEX TO MANUAL)   |        |  6:18 AM    |                      | procedure are in the 
|
|                      |        | PST         |                      |  results section.    
|
+----------------------+--------+-------------+----------------------+----------------------
+
| PHOSPHOROUS          | ASAP   | 2018  |                      |   Results for this   
|
|                      |        |  6:18 AM    |                      | procedure are in the 
|
|                      |        | PST         |                      |  results section.    
|
+----------------------+--------+-------------+----------------------+----------------------
+
| COMPREHENSIVE        | ASAP   | 2018  |                      |   Results for this   
|
| METABOLIC PANEL      |        |  6:18 AM    |                      | procedure are in the 
|
|                      |        | PST         |                      |  results section.    
|
+----------------------+--------+-------------+----------------------+----------------------
+
| POCT GLUCOSE         | Routin | 2018  |                      |   Results for this   
|
|                      | e      |  5:39 AM    |                      | procedure are in the 
|
|                      |        | PST         |                      |  results section.    
|
+----------------------+--------+-------------+----------------------+----------------------
+
| POCT GLUCOSE         | Routin | 2018  |                      |   Results for this   
|
|                      | e      |  9:01 PM    |                      | procedure are in the 
|
|                      |        | PST         |                      |  results section.    
|
+----------------------+--------+-------------+----------------------+----------------------
+
| POCT GLUCOSE         | Routin | 2018  |                      |   Results for this   
|
|                      | e      |  2:08 PM    |                      | procedure are in the 
|
|                      |        | PST         |                      |  results section.    
|
+----------------------+--------+-------------+----------------------+----------------------
+
| ANAEROBIC CULTURE    | STAT   | 2018  |                      |   Results for this   
|
| W/GRAM STAIN         |        |  1:20 PM    |                      | procedure are in the 
|
|                      |        | PST         |                      |  results section.    
 
|
+----------------------+--------+-------------+----------------------+----------------------
+
| FOREFOOT -           |        | 2018  |   Peripheral         |                      
|
| AMPUTATION           |        | 12:30 PM    | arterial disease,    |                      
|
|                      |        | PST         | osteomyelitis left   |                      
|
|                      |        |             | foot                 |                      
|
+----------------------+--------+-------------+----------------------+----------------------
+
 
 
 
+---+--------+
|   |        |
|   | Specia |
|   | l      |
|   | Needs  |
|   |  cysto |
|   |        |
|   | tubing |
|   |        |
|   | irriga |
|   | tion,  |
|   | reques |
|   | t 90   |
|   | minute |
|   | s, PCN |
|   |        |
|   | allerg |
|   | y.     |
|   | Pacema |
|   | ker/AI |
|   | CD,    |
|   | [place] |
|   |        |
|   | Scient |
|   | ific.  |
+---+--------+
 
 
 
+--------------+--------+-------------+---+----------------------+
| POCT GLUCOSE | Routin | 2018  |   |   Results for this   |
|              | e      | 10:27 AM    |   | procedure are in the |
|              |        | PST         |   |  results section.    |
+--------------+--------+-------------+---+----------------------+
 in this encounter
 
 Results
 POCT glucose (2019 11:23 AM)
 
+--------------------+--------------------------+---------------+-----------------+
| Component          | Value                    | Ref Range     | Performed At    |
+--------------------+--------------------------+---------------+-----------------+
| GLUCOSE,POC SCREEN | 194 (H)Comment: Testing  | 65 - 99 mg/dL | [facility] |
|                    | performed at [facility];[address]     |               |                 |
 
|                    | [address];[place]   |               |                 |
|                    | [id number]                    |               |                 |
+--------------------+--------------------------+---------------+-----------------+
 
 
 
+-------------------+------------------+--------------------+--------------+
| Performing        | Address          | City/State/Zipcode | Phone Number |
| Organization      |                  |                    |              |
+-------------------+------------------+--------------------+--------------+
|   [facility] |   [address] | [place] [address] |              |
+-------------------+------------------+--------------------+--------------+
 POCT glucose (2019  5:39 AM)
 
+--------------------+--------------------------+---------------+-----------------+
| Component          | Value                    | Ref Range     | Performed At    |
+--------------------+--------------------------+---------------+-----------------+
| GLUCOSE,POC SCREEN | 110 (H)Comment: Testing  | 65 - 99 mg/dL | [facility] |
|                    | performed at [facility];[address]     |               |                 |
|                    | [address];[place]   |               |                 |
|                    | [id number]                    |               |                 |
+--------------------+--------------------------+---------------+-----------------+
 
 
 
+-------------------+------------------+--------------------+--------------+
| Performing        | Address          | City/State/Zipcode | Phone Number |
| Organization      |                  |                    |              |
+-------------------+------------------+--------------------+--------------+
|   [facility] |   [address] | [place] [address] |              |
+-------------------+------------------+--------------------+--------------+
 Comprehensive metabolic panel (2019  4:58 AM)
 
+----------------+--------------------------+-------------------+--------------+
| Component      | Value                    | Ref Range         | Performed At |
+----------------+--------------------------+-------------------+--------------+
| SODIUM         | 137                      | 135 - 145 mmol/L  | [facility]   |
|                |                          |                   | LABORATORY   |
+----------------+--------------------------+-------------------+--------------+
| POTASSIUM      | 4.5                      | 3.5 - 4.9 mmol/L  | [facility]   |
|                |                          |                   | LABORATORY   |
+----------------+--------------------------+-------------------+--------------+
| CHLORIDE       | 99                       | 99 - 109 mmol/L   | [facility]   |
|                |                          |                   | LABORATORY   |
+----------------+--------------------------+-------------------+--------------+
| CO2            | 31                       | 23 - 32 mmol/L    | [facility]   |
|                |                          |                   | LABORATORY   |
+----------------+--------------------------+-------------------+--------------+
| ANION GAP AGAP | 12                       | 5 - 20 mmol/L     | [facility]   |
|                |                          |                   | LABORATORY   |
+----------------+--------------------------+-------------------+--------------+
| GLUCOSE        | 103 (H)                  | 65 - 99 mg/dL     | [facility]   |
|                |                          |                   | LABORATORY   |
+----------------+--------------------------+-------------------+--------------+
| BUN            | 13                       | 8 - 25 mg/dL      | [facility]   |
|                |                          |                   | LABORATORY   |
+----------------+--------------------------+-------------------+--------------+
| CREATININE     | 3.5 (H)                  | 0.50 - 1.00 mg/dL | [facility]   |
|                |                          |                   | LABORATORY   |
+----------------+--------------------------+-------------------+--------------+
 
| BUN/CREAT      | 4                        |                   | [facility]   |
|                |                          |                   | LABORATORY   |
+----------------+--------------------------+-------------------+--------------+
| CALCIUM        | 9.0                      | 8.5 - 10.5 mg/dL  | [facility]   |
|                |                          |                   | LABORATORY   |
+----------------+--------------------------+-------------------+--------------+
| TOTAL PROTEIN  | 6.3                      | 6.3 - 8.2 g/dL    | [facility]   |
|                |                          |                   | LABORATORY   |
+----------------+--------------------------+-------------------+--------------+
| Albumin        | 2.6 (L)                  | 3.3 - 4.8 g/dL    | [facility]   |
|                |                          |                   | LABORATORY   |
+----------------+--------------------------+-------------------+--------------+
| GLOBULIN       | 3.7                      | 1.3 - 4.9 g/dL    | [facility]   |
|                |                          |                   | LABORATORY   |
+----------------+--------------------------+-------------------+--------------+
| A/G            | 0.7 (L)                  | 1.0 - 2.4         | [facility]   |
|                |                          |                   | LABORATORY   |
+----------------+--------------------------+-------------------+--------------+
| TBIL           | 0.4                      | 0.1 - 1.5 mg/dL   | [facility]   |
|                |                          |                   | LABORATORY   |
+----------------+--------------------------+-------------------+--------------+
| ALK PHOS       | 85                       | 35 - 115 U/L      | [facility]   |
|                |                          |                   | LABORATORY   |
+----------------+--------------------------+-------------------+--------------+
| AST            | 23                       | 10 - 45 U/L       | [facility]   |
|                |                          |                   | LABORATORY   |
+----------------+--------------------------+-------------------+--------------+
| ALT            | 17                       | 10 - 65 U/L       | [facility]   |
|                |                          |                   | LABORATORY   |
+----------------+--------------------------+-------------------+--------------+
| EGFR           | 13 (L)Comment: GFR <60:  | >60 mL/min/1.73m2 | [facility]   |
|                | CHRONIC KIDNEY DISEASE,  |                   | LABORATORY   |
|                | IF FOUND OVER A 3 MONTH  |                   |              |
|                | PERIOD.GFR <15: KIDNEY   |                   |              |
|                | FAILURE.FOR       |                   |              |
|                | [employer], MULTIPLY THE  |                   |              |
|                | CALCULATED GFR BY        |                   |              |
|                | 1.210.This eGFR is       |                   |              |
|                | calculated using the     |                   |              |
|                | MDRD IDMS traceable      |                   |              |
|                | equation.Testing         |                   |              |
|                | performed at [facility], [address] |                   |              |
|                |  [facility],        |                   |              |
|                | [place]    [address]   |                   |              |
+----------------+--------------------------+-------------------+--------------+
 
 
 
+----------+
| Specimen |
+----------+
| Blood    |
+----------+
 
 
 
+---------------+-------------------------+---------------------+----------------+
| Performing    | Address                 | City/State/Zipcode  | Phone Number   |
| Organization  |                         |                     |                |
+---------------+-------------------------+---------------------+----------------+
 
|   [facility]  |   [address]  | [place] [address] |   [phone] |
| LABORATORY    | [address]                   |                     |                |
+---------------+-------------------------+---------------------+----------------+
 CBC W/Auto Diff (Reflex to Manual) (2019  4:58 AM)
 
+-----------------+--------------------------+-------------------+--------------+
| Component       | Value                    | Ref Range         | Performed At |
+-----------------+--------------------------+-------------------+--------------+
| WBC             | 5.21                     | 3.80 - 11.00 K/uL | [facility]   |
|                 |                          |                   | LABORATORY   |
+-----------------+--------------------------+-------------------+--------------+
| RBC             | 2.54 (L)                 | 3.70 - 5.10 M/uL  | [facility]   |
|                 |                          |                   | LABORATORY   |
+-----------------+--------------------------+-------------------+--------------+
| HGB             | 8.7 (L)                  | 11.3 - 15.5 g/dL  | [facility]   |
|                 |                          |                   | LABORATORY   |
+-----------------+--------------------------+-------------------+--------------+
| HCT             | 26.3 (L)                 | 34.0 - 46.0 %     | [facility]   |
|                 |                          |                   | LABORATORY   |
+-----------------+--------------------------+-------------------+--------------+
| MCV             | 103.9 (H)                | 80.0 - 100.0 fl   | [facility]   |
|                 |                          |                   | LABORATORY   |
+-----------------+--------------------------+-------------------+--------------+
| MCH             | 34.4 (H)                 | 27.0 - 34.0 pg    | [facility]   |
|                 |                          |                   | LABORATORY   |
+-----------------+--------------------------+-------------------+--------------+
| MCHC            | 33.1                     | 32.0 - 35.5 g/dL  | [facility]   |
|                 |                          |                   | LABORATORY   |
+-----------------+--------------------------+-------------------+--------------+
| RDW SD          | 61.7 (H)                 | 37 - 53 fl        | [facility]   |
|                 |                          |                   | LABORATORY   |
+-----------------+--------------------------+-------------------+--------------+
| PLT             | 154                      | 150 - 400 K/uL    | [facility]   |
|                 |                          |                   | LABORATORY   |
+-----------------+--------------------------+-------------------+--------------+
| MPV             | 9.3                      | fl                | [facility]   |
|                 |                          |                   | LABORATORY   |
+-----------------+--------------------------+-------------------+--------------+
| DIFF TYPE       | AUTOMATED                |                   | [facility]   |
|                 |                          |                   | LABORATORY   |
+-----------------+--------------------------+-------------------+--------------+
| NEUTROPHILS     | 69.68                    | %                 | [facility]   |
|                 |                          |                   | LABORATORY   |
+-----------------+--------------------------+-------------------+--------------+
| LYMPHOCYTES     | 19.86                    | %                 | [facility]   |
|                 |                          |                   | LABORATORY   |
+-----------------+--------------------------+-------------------+--------------+
| MONOCYTES       | 9.79                     | %                 | [facility]   |
|                 |                          |                   | LABORATORY   |
+-----------------+--------------------------+-------------------+--------------+
| EOSINOPHILS     | 0.00                     | %                 | [facility]   |
|                 |                          |                   | LABORATORY   |
+-----------------+--------------------------+-------------------+--------------+
| BASOPHILS       | 0.67                     | %                 | [facility]   |
|                 |                          |                   | LABORATORY   |
+-----------------+--------------------------+-------------------+--------------+
| NEUTROPHILS ABS | 3.63                     | 1.90 - 7.40 K/uL  | [facility]   |
|                 |                          |                   | LABORATORY   |
+-----------------+--------------------------+-------------------+--------------+
| LYMPHOCYTES ABS | 1.03                     | 1.00 - 3.90 K/uL  | [facility]   |
 
|                 |                          |                   | LABORATORY   |
+-----------------+--------------------------+-------------------+--------------+
| MONOCYTES ABS   | 0.51                     | 0.00 - 0.80 K/uL  | [facility]   |
|                 |                          |                   | LABORATORY   |
+-----------------+--------------------------+-------------------+--------------+
| EOSINOPHILS ABS | 0.00                     | 0.00 - 0.50 K/uL  | [facility]   |
|                 |                          |                   | LABORATORY   |
+-----------------+--------------------------+-------------------+--------------+
| BASOPHILS ABS   | 0.04Comment: Testing     | 0.00 - 0.10 K/uL  | [facility]   |
|                 | performed at [facility], [address] |                   | LABORATORY   |
|                 |  [address],        |                   |              |
|                 | [place]  [id number]     |                   |              |
+-----------------+--------------------------+-------------------+--------------+
 
 
 
+----------+
| Specimen |
+----------+
| Blood    |
+----------+
 
 
 
+---------------+-------------------------+---------------------+----------------+
| Performing    | Address                 | City/State/Zipcode  | Phone Number   |
| Organization  |                         |                     |                |
+---------------+-------------------------+---------------------+----------------+
|   [facility]  |   [address]  | [place] [address] |   [phone] |
| LABORATORY    | [address]                   |                     |                |
+---------------+-------------------------+---------------------+----------------+
 POCT glucose (2019  8:58 PM)
 
+--------------------+--------------------------+---------------+-----------------+
| Component          | Value                    | Ref Range     | Performed At    |
+--------------------+--------------------------+---------------+-----------------+
| GLUCOSE,POC SCREEN | 122 (H)Comment: Testing  | 65 - 99 mg/dL | [facility] |
|                    | performed at [facility];[address]     |               |                 |
|                    | [address];[place]   |               |                 |
|                    | [id number]                    |               |                 |
+--------------------+--------------------------+---------------+-----------------+
 
 
 
+-------------------+------------------+--------------------+--------------+
| Performing        | Address          | City/State/Zipcode | Phone Number |
| Organization      |                  |                    |              |
+-------------------+------------------+--------------------+--------------+
|   [facility] |   [address] | [place] [address] |              |
+-------------------+------------------+--------------------+--------------+
 POCT glucose (2019  4:09 PM)
 
+--------------------+--------------------------+---------------+-----------------+
| Component          | Value                    | Ref Range     | Performed At    |
+--------------------+--------------------------+---------------+-----------------+
| GLUCOSE,POC SCREEN | 192 (H)Comment: Testing  | 65 - 99 mg/dL | [facility] |
|                    | performed at [facility];[address]     |               |                 |
|                    | [address];[place]   |               |                 |
|                    | [id number]                    |               |                 |
+--------------------+--------------------------+---------------+-----------------+
 
 
 
 
+-------------------+------------------+--------------------+--------------+
| Performing        | Address          | City/State/Zipcode | Phone Number |
| Organization      |                  |                    |              |
+-------------------+------------------+--------------------+--------------+
|   [facility] |   [address] | [place] [address] |              |
+-------------------+------------------+--------------------+--------------+
 POCT glucose (2019 11:00 AM)
 
+--------------------+-------------------------+---------------+-----------------+
| Component          | Value                   | Ref Range     | Performed At    |
+--------------------+-------------------------+---------------+-----------------+
| GLUCOSE,POC SCREEN | 84Comment: Testing      | 65 - 99 mg/dL | [facility] |
|                    | performed at [facility];[address]    |               |                 |
|                    | [address];[place], [place]  |               |                 |
|                    | [address]                   |               |                 |
+--------------------+-------------------------+---------------+-----------------+
 
 
 
+-------------------+------------------+--------------------+--------------+
| Performing        | Address          | City/State/Zipcode | Phone Number |
| Organization      |                  |                    |              |
+-------------------+------------------+--------------------+--------------+
|   [facility] |   [address] | [place] [address] |              |
+-------------------+------------------+--------------------+--------------+
 POCT glucose (2019  5:38 AM)
 
+--------------------+-------------------------+---------------+-----------------+
| Component          | Value                   | Ref Range     | Performed At    |
+--------------------+-------------------------+---------------+-----------------+
| GLUCOSE,POC SCREEN | 73Comment: Testing      | 65 - 99 mg/dL | [facility] |
|                    | performed at [facility];[address]    |               |                 |
|                    | [address];[place]  |               |                 |
|                    | [id number]                   |               |                 |
+--------------------+-------------------------+---------------+-----------------+
 
 
 
+-------------------+------------------+--------------------+--------------+
| Performing        | Address          | City/State/Zipcode | Phone Number |
| Organization      |                  |                    |              |
+-------------------+------------------+--------------------+--------------+
|   [facility] |   [address] | [place] [address] |              |
+-------------------+------------------+--------------------+--------------+
 Comprehensive metabolic panel (2019  5:25 AM)
 
+----------------+--------------------------+-------------------+--------------+
| Component      | Value                    | Ref Range         | Performed At |
+----------------+--------------------------+-------------------+--------------+
| SODIUM         | 138                      | 135 - 145 mmol/L  | [facility]   |
|                |                          |                   | LABORATORY   |
+----------------+--------------------------+-------------------+--------------+
| POTASSIUM      | 4.5                      | 3.5 - 4.9 mmol/L  | [facility]   |
|                |                          |                   | LABORATORY   |
+----------------+--------------------------+-------------------+--------------+
| CHLORIDE       | 100                      | 99 - 109 mmol/L   | [facility]   |
|                |                          |                   | LABORATORY   |
 
+----------------+--------------------------+-------------------+--------------+
| CO2            | 29                       | 23 - 32 mmol/L    | [facility]   |
|                |                          |                   | LABORATORY   |
+----------------+--------------------------+-------------------+--------------+
| ANION GAP AGAP | 14                       | 5 - 20 mmol/L     | [facility]   |
|                |                          |                   | LABORATORY   |
+----------------+--------------------------+-------------------+--------------+
| GLUCOSE        | 86                       | 65 - 99 mg/dL     | [facility]   |
|                |                          |                   | LABORATORY   |
+----------------+--------------------------+-------------------+--------------+
| BUN            | 21                       | 8 - 25 mg/dL      | [facility]   |
|                |                          |                   | LABORATORY   |
+----------------+--------------------------+-------------------+--------------+
| CREATININE     | 4.5 (H)                  | 0.50 - 1.00 mg/dL | [facility]   |
|                |                          |                   | LABORATORY   |
+----------------+--------------------------+-------------------+--------------+
| BUN/CREAT      | 5                        |                   | [facility]   |
|                |                          |                   | LABORATORY   |
+----------------+--------------------------+-------------------+--------------+
| CALCIUM        | 9.2                      | 8.5 - 10.5 mg/dL  | [facility]   |
|                |                          |                   | LABORATORY   |
+----------------+--------------------------+-------------------+--------------+
| TOTAL PROTEIN  | 5.9 (L)                  | 6.3 - 8.2 g/dL    | [facility]   |
|                |                          |                   | LABORATORY   |
+----------------+--------------------------+-------------------+--------------+
| Albumin        | 2.4 (L)                  | 3.3 - 4.8 g/dL    | [facility]   |
|                |                          |                   | LABORATORY   |
+----------------+--------------------------+-------------------+--------------+
| GLOBULIN       | 3.5                      | 1.3 - 4.9 g/dL    | [facility]   |
|                |                          |                   | LABORATORY   |
+----------------+--------------------------+-------------------+--------------+
| A/G            | 0.7 (L)                  | 1.0 - 2.4         | [facility]   |
|                |                          |                   | LABORATORY   |
+----------------+--------------------------+-------------------+--------------+
| TBIL           | 0.3                      | 0.1 - 1.5 mg/dL   | [facility]   |
|                |                          |                   | LABORATORY   |
+----------------+--------------------------+-------------------+--------------+
| ALK PHOS       | 78                       | 35 - 115 U/L      | [facility]   |
|                |                          |                   | LABORATORY   |
+----------------+--------------------------+-------------------+--------------+
| AST            | 21                       | 10 - 45 U/L       | [facility]   |
|                |                          |                   | LABORATORY   |
+----------------+--------------------------+-------------------+--------------+
| ALT            | 16                       | 10 - 65 U/L       | [facility]   |
|                |                          |                   | LABORATORY   |
+----------------+--------------------------+-------------------+--------------+
| EGFR           | 10 (L)Comment: GFR <60:  | >60 mL/min/1.73m2 | [facility]   |
|                | CHRONIC KIDNEY DISEASE,  |                   | LABORATORY   |
|                | IF FOUND OVER A 3 MONTH  |                   |              |
|                | PERIOD.GFR <15: KIDNEY   |                   |              |
|                | FAILURE.FOR       |                   |              |
|                | AMERICANS, MULTIPLY THE  |                   |              |
|                | CALCULATED GFR BY        |                   |              |
|                | 1.210.This eGFR is       |                   |              |
|                | calculated using the     |                   |              |
|                | MDRD IDMS traceable      |                   |              |
|                | equation.Testing         |                   |              |
|                | performed at [facility], [address] |                   |              |
|                |  [facility],        |                   |              |
|                | [place]    [address]   |                   |              |
 
+----------------+--------------------------+-------------------+--------------+
 
 
 
+----------+
| Specimen |
+----------+
| Blood    |
+----------+
 
 
 
+---------------+-------------------------+---------------------+----------------+
| Performing    | Address                 | City/State/Zipcode  | Phone Number   |
| Organization  |                         |                     |                |
+---------------+-------------------------+---------------------+----------------+
|   [facility]  |   [address]  | [place] [address] |   [phone] |
| LABORATORY    | [address]                   |                     |                |
+---------------+-------------------------+---------------------+----------------+
 CBC W/Auto Diff (Reflex to Manual) (2019  5:25 AM)
 
+-----------------+--------------------------+-------------------+--------------+
| Component       | Value                    | Ref Range         | Performed At |
+-----------------+--------------------------+-------------------+--------------+
| WBC             | 5.22                     | 3.80 - 11.00 K/uL | [facility]   |
|                 |                          |                   | LABORATORY   |
+-----------------+--------------------------+-------------------+--------------+
| RBC             | 2.48 (L)                 | 3.70 - 5.10 M/uL  | [facility]   |
|                 |                          |                   | LABORATORY   |
+-----------------+--------------------------+-------------------+--------------+
| HGB             | 8.4 (L)                  | 11.3 - 15.5 g/dL  | [facility]   |
|                 |                          |                   | LABORATORY   |
+-----------------+--------------------------+-------------------+--------------+
| HCT             | 25.7 (L)                 | 34.0 - 46.0 %     | [facility]   |
|                 |                          |                   | LABORATORY   |
+-----------------+--------------------------+-------------------+--------------+
| MCV             | 103.9 (H)                | 80.0 - 100.0 fl   | [facility]   |
|                 |                          |                   | LABORATORY   |
+-----------------+--------------------------+-------------------+--------------+
| MCH             | 33.8                     | 27.0 - 34.0 pg    | [facility]   |
|                 |                          |                   | LABORATORY   |
+-----------------+--------------------------+-------------------+--------------+
| MCHC            | 32.5                     | 32.0 - 35.5 g/dL  | [facility]   |
|                 |                          |                   | LABORATORY   |
+-----------------+--------------------------+-------------------+--------------+
| RDW SD          | 59.5 (H)                 | 37 - 53 fl        | [facility]   |
|                 |                          |                   | LABORATORY   |
+-----------------+--------------------------+-------------------+--------------+
| PLT             | 149 (L)                  | 150 - 400 K/uL    | [facility]   |
|                 |                          |                   | LABORATORY   |
+-----------------+--------------------------+-------------------+--------------+
| MPV             | 9.0                      | fl                | [facility]   |
|                 |                          |                   | LABORATORY   |
+-----------------+--------------------------+-------------------+--------------+
| DIFF TYPE       | AUTOMATED                |                   | [facility]   |
|                 |                          |                   | LABORATORY   |
+-----------------+--------------------------+-------------------+--------------+
| NEUTROPHILS     | 72.47                    | %                 | [facility]   |
|                 |                          |                   | LABORATORY   |
+-----------------+--------------------------+-------------------+--------------+
 
| LYMPHOCYTES     | 18.03                    | %                 | [facility]   |
|                 |                          |                   | LABORATORY   |
+-----------------+--------------------------+-------------------+--------------+
| MONOCYTES       | 8.92                     | %                 | [facility]   |
|                 |                          |                   | LABORATORY   |
+-----------------+--------------------------+-------------------+--------------+
| EOSINOPHILS     | 0.00                     | %                 | [facility]   |
|                 |                          |                   | LABORATORY   |
+-----------------+--------------------------+-------------------+--------------+
| BASOPHILS       | 0.58                     | %                 | [facility]   |
|                 |                          |                   | LABORATORY   |
+-----------------+--------------------------+-------------------+--------------+
| NEUTROPHILS ABS | 3.78                     | 1.90 - 7.40 K/uL  | [facility]   |
|                 |                          |                   | LABORATORY   |
+-----------------+--------------------------+-------------------+--------------+
| LYMPHOCYTES ABS | 0.94 (L)                 | 1.00 - 3.90 K/uL  | [facility]   |
|                 |                          |                   | LABORATORY   |
+-----------------+--------------------------+-------------------+--------------+
| MONOCYTES ABS   | 0.47                     | 0.00 - 0.80 K/uL  | [facility]   |
|                 |                          |                   | LABORATORY   |
+-----------------+--------------------------+-------------------+--------------+
| EOSINOPHILS ABS | 0.00                     | 0.00 - 0.50 K/uL  | [facility]   |
|                 |                          |                   | LABORATORY   |
+-----------------+--------------------------+-------------------+--------------+
| BASOPHILS ABS   | 0.03Comment: Testing     | 0.00 - 0.10 K/uL  | [facility]   |
|                 | performed at [facility], [address] |                   | LABORATORY   |
|                 |  [address],        |                   |              |
|                 | [place]  [address]     |                   |              |
+-----------------+--------------------------+-------------------+--------------+
 
 
 
+----------+
| Specimen |
+----------+
| Blood    |
+----------+
 
 
 
+---------------+-------------------------+---------------------+----------------+
| Performing    | Address                 | City/State/Zipcode  | Phone Number   |
| Organization  |                         |                     |                |
+---------------+-------------------------+---------------------+----------------+
|   [facility]  |   [address]  | [place] [address] |   [phone] |
| LABORATORY    | [address]                   |                     |                |
+---------------+-------------------------+---------------------+----------------+
 POCT glucose (2019  9:38 PM)
 
+--------------------+--------------------------+---------------+-----------------+
| Component          | Value                    | Ref Range     | Performed At    |
+--------------------+--------------------------+---------------+-----------------+
| GLUCOSE,POC SCREEN | 206 (H)Comment: Testing  | 65 - 99 mg/dL | [facility] LABORATORY |
|                    | performed at [facility];[address]     |               |                 |
|                    | [address];[place]   |               |                 |
|                    | [id number]                    |               |                 |
+--------------------+--------------------------+---------------+-----------------+
 
 
 
 
+-------------------+------------------+--------------------+--------------+
| Performing        | Address          | City/State/Zipcode | Phone Number |
| Organization      |                  |                    |              |
+-------------------+------------------+--------------------+--------------+
|   [facility] LABORATORY |   [address] | [place] [address] |              |
+-------------------+------------------+--------------------+--------------+
 POCT glucose (2019  4:35 PM)
 
+--------------------+--------------------------+---------------+-----------------+
| Component          | Value                    | Ref Range     | Performed At    |
+--------------------+--------------------------+---------------+-----------------+
| GLUCOSE,POC SCREEN | 168 (H)Comment: Testing  | 65 - 99 mg/dL | [facility] LABORATORY |
|                    | performed at [facility];[address]     |               |                 |
|                    | [address];[place]   |               |                 |
|                    | [id number]                    |               |                 |
+--------------------+--------------------------+---------------+-----------------+
 
 
 
+-------------------+------------------+--------------------+--------------+
| Performing        | Address          | City/State/Zipcode | Phone Number |
| Organization      |                  |                    |              |
+-------------------+------------------+--------------------+--------------+
|   [facility] LABORATORY |   [address] | [place] [address] |              |
+-------------------+------------------+--------------------+--------------+
 POCT glucose (2019 11:37 AM)
 
+--------------------+--------------------------+---------------+-----------------+
| Component          | Value                    | Ref Range     | Performed At    |
+--------------------+--------------------------+---------------+-----------------+
| GLUCOSE,POC SCREEN | 164 (H)Comment: Testing  | 65 - 99 mg/dL | [facility] LABORATORY |
|                    | performed at [facility];[address]     |               |                 |
|                    | [address];[place]   |               |                 |
|                    | [address]                    |               |                 |
+--------------------+--------------------------+---------------+-----------------+
 
 
 
+-------------------+------------------+--------------------+--------------+
| Performing        | Address          | City/State/Zipcode | Phone Number |
| Organization      |                  |                    |              |
+-------------------+------------------+--------------------+--------------+
|   [facility] LABORATORY |   [address] | [place] [address] |              |
+-------------------+------------------+--------------------+--------------+
 EEG (2019  7:55 AM)
 
+------------------------------------------------------------------------+--------------+
| Narrative                                                              | Performed At |
+------------------------------------------------------------------------+--------------+
|   CHAYA DaoEEG/EP T.         2019    7:55 AM     [facility]  |              |
| [facility]  Neurodiagnostic Dept  [address]          |              |
| [place] [address]     Patient: Cherie Villalobos ID: [id number]       |              |
| : 1950 Age: 68   Gender: female Room: [facility]      Physician:       |              |
| Ambika Leyva Technician: Nida Vernon   Ref. Physician: Andrés     |              |
| Earl RODRIGES          Recording Date: 2019 Duration: 00:20:37           |              |
| Medications:    No medications.  History:    episodes of twitching     |              |
| mainly upper extremities, looks   like myoclonic jerks  General        |              |
| Description:    This was a 19 channel awake EEG recording   with       |              |
| International 10/20 electrode placements. The background   activity    |              |
| consisted of diffuse low to moderate amplitude Theta   (6-7Hz) and     |              |
 
| mild Delta intermixed throughout the recording.     Activation         |              |
| Procedures:    No change of the background activity with   eye opening |              |
|  was noted. Photic stimulation produced no significant   changes to    |              |
| the background activity.    EMG artifact was noted   intermittently    |              |
| with patient talking, eye opening and body   movements.   No           |              |
| epileptiform activity was noted with body jerking.      Sleep          |              |
| Patterns:    No drowsiness or sleep was noted.     Impression:    This |              |
|  wake EEG is abnormal.    Diffuse slowing is   suggestive of a diffuse |              |
|  encephalopathy of metabolic, degenerative   or vascular origin. No    |              |
| epileptiform activity was noted with body   jerking.   The absence of  |              |
| epileptiform discharge during the EEG recording   does not rule out    |              |
| the diagnosis of a seizure disorder. Clinical   correlation is         |              |
| recommended.               ______________________________              |              |
| Electronically signed by Ambika Leyva    on 2019 7:25 AM      |              |
+------------------------------------------------------------------------+--------------+
 Comprehensive metabolic panel (2019  5:50 AM)
 
+----------------+--------------------------+-------------------+--------------+
| Component      | Value                    | Ref Range         | Performed At |
+----------------+--------------------------+-------------------+--------------+
| SODIUM         | 136                      | 135 - 145 mmol/L  | [facility]   |
|                |                          |                   | LABORATORY   |
+----------------+--------------------------+-------------------+--------------+
| POTASSIUM      | 4.8                      | 3.5 - 4.9 mmol/L  | [facility]   |
|                |                          |                   | LABORATORY   |
+----------------+--------------------------+-------------------+--------------+
| CHLORIDE       | 98 (L)                   | 99 - 109 mmol/L   | [facility]   |
|                |                          |                   | LABORATORY   |
+----------------+--------------------------+-------------------+--------------+
| CO2            | 26                       | 23 - 32 mmol/L    | [facility]   |
|                |                          |                   | LABORATORY   |
+----------------+--------------------------+-------------------+--------------+
| ANION GAP AGAP | 17                       | 5 - 20 mmol/L     | [facility]   |
|                |                          |                   | LABORATORY   |
+----------------+--------------------------+-------------------+--------------+
| GLUCOSE        | 105 (H)                  | 65 - 99 mg/dL     | [facility]   |
|                |                          |                   | LABORATORY   |
+----------------+--------------------------+-------------------+--------------+
| BUN            | 29 (H)                   | 8 - 25 mg/dL      | [facility]   |
|                |                          |                   | LABORATORY   |
+----------------+--------------------------+-------------------+--------------+
| CREATININE     | 6.7 (H)                  | 0.50 - 1.00 mg/dL | [facility]   |
|                |                          |                   | LABORATORY   |
+----------------+--------------------------+-------------------+--------------+
| BUN/CREAT      | 4                        |                   | [facility]   |
|                |                          |                   | LABORATORY   |
+----------------+--------------------------+-------------------+--------------+
| CALCIUM        | 9.1                      | 8.5 - 10.5 mg/dL  | [facility]   |
|                |                          |                   | LABORATORY   |
+----------------+--------------------------+-------------------+--------------+
| TOTAL PROTEIN  | 6.4                      | 6.3 - 8.2 g/dL    | [facility]   |
|                |                          |                   | LABORATORY   |
+----------------+--------------------------+-------------------+--------------+
| Albumin        | 2.5 (L)                  | 3.3 - 4.8 g/dL    | [facility]   |
|                |                          |                   | LABORATORY   |
+----------------+--------------------------+-------------------+--------------+
| GLOBULIN       | 3.9                      | 1.3 - 4.9 g/dL    | [facility]   |
|                |                          |                   | LABORATORY   |
+----------------+--------------------------+-------------------+--------------+
| A/G            | 0.6 (L)                  | 1.0 - 2.4         | [facility]   |
 
|                |                          |                   | LABORATORY   |
+----------------+--------------------------+-------------------+--------------+
| TBIL           | 0.4                      | 0.1 - 1.5 mg/dL   | [facility]   |
|                |                          |                   | LABORATORY   |
+----------------+--------------------------+-------------------+--------------+
| ALK PHOS       | 79                       | 35 - 115 U/L      | [facility]   |
|                |                          |                   | LABORATORY   |
+----------------+--------------------------+-------------------+--------------+
| AST            | 24                       | 10 - 45 U/L       | [facility]   |
|                |                          |                   | LABORATORY   |
+----------------+--------------------------+-------------------+--------------+
| ALT            | 17                       | 10 - 65 U/L       | [facility]   |
|                |                          |                   | LABORATORY   |
+----------------+--------------------------+-------------------+--------------+
| EGFR           | 6 (L)Comment: GFR <60:   | >60 mL/min/1.73m2 | [facility]   |
|                | CHRONIC KIDNEY DISEASE,  |                   | LABORATORY   |
|                | IF FOUND OVER A 3 MONTH  |                   |              |
|                | PERIOD.GFR <15: KIDNEY   |                   |              |
|                | FAILURE.FOR       |                   |              |
|                | AMERICANS, MULTIPLY THE  |                   |              |
|                | CALCULATED GFR BY        |                   |              |
|                | 1.210.This eGFR is       |                   |              |
|                | calculated using the     |                   |              |
|                | MDRD IDMS traceable      |                   |              |
|                | equation.Testing         |                   |              |
|                | performed at [facility], [address] |                   |              |
|                |  [facility]        |                   |              |
|                | [place]    [address]   |                   |              |
+----------------+--------------------------+-------------------+--------------+
 
 
 
+----------+
| Specimen |
+----------+
| Blood    |
+----------+
 
 
 
+---------------+-------------------------+---------------------+----------------+
| Performing    | Address                 | City/State/Zipcode  | Phone Number   |
| Organization  |                         |                     |                |
+---------------+-------------------------+---------------------+----------------+
|   [facility]  |   [address]  | [place] [address] |   [phone] |
| LABORATORY    | [place]                   |                     |                |
+---------------+-------------------------+---------------------+----------------+
 CBC W/Auto Diff (Reflex to Manual) (2019  5:50 AM)
 
+-----------------+--------------------------+-------------------+--------------+
| Component       | Value                    | Ref Range         | Performed At |
+-----------------+--------------------------+-------------------+--------------+
| WBC             | 6.13                     | 3.80 - 11.00 K/uL | [facility]   |
|                 |                          |                   | LABORATORY   |
+-----------------+--------------------------+-------------------+--------------+
| RBC             | 2.60 (L)                 | 3.70 - 5.10 M/uL  | [facility]   |
|                 |                          |                   | LABORATORY   |
+-----------------+--------------------------+-------------------+--------------+
| HGB             | 8.7 (L)                  | 11.3 - 15.5 g/dL  | [facility]   |
|                 |                          |                   | LABORATORY   |
 
+-----------------+--------------------------+-------------------+--------------+
| HCT             | 27.0 (L)                 | 34.0 - 46.0 %     | [facility]   |
|                 |                          |                   | LABORATORY   |
+-----------------+--------------------------+-------------------+--------------+
| MCV             | 103.9 (H)                | 80.0 - 100.0 fl   | [facility]   |
|                 |                          |                   | LABORATORY   |
+-----------------+--------------------------+-------------------+--------------+
| MCH             | 33.6                     | 27.0 - 34.0 pg    | [facility]   |
|                 |                          |                   | LABORATORY   |
+-----------------+--------------------------+-------------------+--------------+
| MCHC            | 32.4                     | 32.0 - 35.5 g/dL  | [facility]   |
|                 |                          |                   | LABORATORY   |
+-----------------+--------------------------+-------------------+--------------+
| RDW SD          | 63.0 (H)                 | 37 - 53 fl        | [facility]   |
|                 |                          |                   | LABORATORY   |
+-----------------+--------------------------+-------------------+--------------+
| PLT             | 156                      | 150 - 400 K/uL    | [facility]   |
|                 |                          |                   | LABORATORY   |
+-----------------+--------------------------+-------------------+--------------+
| MPV             | 9.0                      | fl                | [facility]   |
|                 |                          |                   | LABORATORY   |
+-----------------+--------------------------+-------------------+--------------+
| DIFF TYPE       | AUTOMATED                |                   | [facility]   |
|                 |                          |                   | LABORATORY   |
+-----------------+--------------------------+-------------------+--------------+
| NEUTROPHILS     | 71.63                    | %                 | [facility]   |
|                 |                          |                   | LABORATORY   |
+-----------------+--------------------------+-------------------+--------------+
| LYMPHOCYTES     | 15.72                    | %                 | [facility]   |
|                 |                          |                   | LABORATORY   |
+-----------------+--------------------------+-------------------+--------------+
| MONOCYTES       | 12.18                    | %                 | [facility]   |
|                 |                          |                   | LABORATORY   |
+-----------------+--------------------------+-------------------+--------------+
| EOSINOPHILS     | 0.00                     | %                 | [facility]   |
|                 |                          |                   | LABORATORY   |
+-----------------+--------------------------+-------------------+--------------+
| BASOPHILS       | 0.47                     | %                 | [facility]   |
|                 |                          |                   | LABORATORY   |
+-----------------+--------------------------+-------------------+--------------+
| NEUTROPHILS ABS | 4.39                     | 1.90 - 7.40 K/uL  | [facility]   |
|                 |                          |                   | LABORATORY   |
+-----------------+--------------------------+-------------------+--------------+
| LYMPHOCYTES ABS | 0.96 (L)                 | 1.00 - 3.90 K/uL  | [facility]   |
|                 |                          |                   | LABORATORY   |
+-----------------+--------------------------+-------------------+--------------+
| MONOCYTES ABS   | 0.75                     | 0.00 - 0.80 K/uL  | [facility]   |
|                 |                          |                   | LABORATORY   |
+-----------------+--------------------------+-------------------+--------------+
| EOSINOPHILS ABS | 0.00                     | 0.00 - 0.50 K/uL  | [facility]   |
|                 |                          |                   | LABORATORY   |
+-----------------+--------------------------+-------------------+--------------+
| BASOPHILS ABS   | 0.03Comment: Testing     | 0.00 - 0.10 K/uL  | [facility]   |
|                 | performed at [facility], [address] |                   | LABORATORY   |
|                 |  [address],        |                   |              |
|                 | [place]  [id number]     |                   |              |
+-----------------+--------------------------+-------------------+--------------+
 
 
 
 
+----------+
| Specimen |
+----------+
| Blood    |
+----------+
 
 
 
+---------------+-------------------------+---------------------+----------------+
| Performing    | Address                 | City/State/Zipcode  | Phone Number   |
| Organization  |                         |                     |                |
+---------------+-------------------------+---------------------+----------------+
|   [facility]  |   [address]  | [place] [address] |   [phone] |
| LABORATORY    | [address]                   |                     |                |
+---------------+-------------------------+---------------------+----------------+
 POCT glucose (2019  5:45 AM)
 
+--------------------+--------------------------+---------------+-----------------+
| Component          | Value                    | Ref Range     | Performed At    |
+--------------------+--------------------------+---------------+-----------------+
| GLUCOSE,POC SCREEN | 107 (H)Comment: Testing  | 65 - 99 mg/dL | [facility] |
|                    | performed at [facility];[address]     |               |                 |
|                    | [address];[place]   |               |                 |
|                    | [id number]                    |               |                 |
+--------------------+--------------------------+---------------+-----------------+
 
 
 
+-------------------+------------------+--------------------+--------------+
| Performing        | Address          | City/State/Zipcode | Phone Number |
| Organization      |                  |                    |              |
+-------------------+------------------+--------------------+--------------+
|   [facility] |   [address] | [place] [address] |              |
+-------------------+------------------+--------------------+--------------+
 Troponin I (2019 12:45 AM)
 
+------------+--------------------------+-------------------+-----------------+
| Component  | Value                    | Ref Range         | Performed At    |
+------------+--------------------------+-------------------+-----------------+
| TROPONIN I | 0.256 (H)Comment:   0.00 | 0.00 - 0.10 ng/mL | [facility] |
|            |  to 0.10     CONSISTENT  |                   |                 |
|            | WITH NORMAL              |                   |                 |
|            | POPULATION0.11 to        |                   |                 |
|            | 0.60     CONSISTENT WITH |                   |                 |
|            |  INCREASED RISK FOR      |                   |                 |
|            | ADVERSE OUTCOMES>        |                   |                 |
|            | 0.60                     |                   |                 |
|            | CONSISTENT WITH WHO      |                   |                 |
|            | CRITERIA FOR ACUTE MI    |                   |                 |
|            | Testing performed at     |                   |                 |
|            | [facility];[address]            |                   |                 |
|            | [facility];[place] [address]   |                   |                 |
+------------+--------------------------+-------------------+-----------------+
 
 
 
+----------+
| Specimen |
+----------+
| Blood    |
 
+----------+
 
 
 
+-------------------+------------------+--------------------+--------------+
| Performing        | Address          | City/State/Zipcode | Phone Number |
| Organization      |                  |                    |              |
+-------------------+------------------+--------------------+--------------+
|   [facility] |   [address] | [place] [address] |              |
+-------------------+------------------+--------------------+--------------+
 Troponin I (2019  9:10 PM)
 
+------------+--------------------------+-------------------+-----------------+
| Component  | Value                    | Ref Range         | Performed At    |
+------------+--------------------------+-------------------+-----------------+
| TROPONIN I | 0.274 (H)Comment:   0.00 | 0.00 - 0.10 ng/mL | [facility] |
|            |  to 0.10     CONSISTENT  |                   |                 |
|            | WITH NORMAL              |                   |                 |
|            | POPULATION0.11 to        |                   |                 |
|            | 0.60     CONSISTENT WITH |                   |                 |
|            |  INCREASED RISK FOR      |                   |                 |
|            | ADVERSE OUTCOMES>        |                   |                 |
|            | 0.60                     |                   |                 |
|            | CONSISTENT WITH WHO      |                   |                 |
|            | CRITERIA FOR ACUTE MI    |                   |                 |
|            | Testing performed at     |                   |                 |
|            | [facility];[address]            |                   |                 |
|            [address];[place] [address]   |                   |                 |
+------------+--------------------------+-------------------+-----------------+
 
 
 
+----------+
| Specimen |
+----------+
| Blood    |
+----------+
 
 
 
+-------------------+------------------+--------------------+--------------+
| Performing        | Address          | City/State/Zipcode | Phone Number |
| Organization      |                  |                    |              |
+-------------------+------------------+--------------------+--------------+
|   [facility] |   [address] | [place] [address] |              |
+-------------------+------------------+--------------------+--------------+
 POCT glucose (2019  9:08 PM)
 
+--------------------+--------------------------+---------------+-----------------+
| Component          | Value                    | Ref Range     | Performed At    |
+--------------------+--------------------------+---------------+-----------------+
| GLUCOSE,POC SCREEN | 169 (H)Comment: Testing  | 65 - 99 mg/dL | [facility] |
|                    | performed at [facility];[address]     |               |                 |
|                    | [address];[place]   |               |                 |
|                    | [id number]                    |               |                 |
+--------------------+--------------------------+---------------+-----------------+
 
 
 
+-------------------+------------------+--------------------+--------------+
 
| Performing        | Address          | City/State/Zipcode | Phone Number |
| Organization      |                  |                    |              |
+-------------------+------------------+--------------------+--------------+
|   [facility] |   [address] | [place] [address] |              |
+-------------------+------------------+--------------------+--------------+
 X-ray chest 1 view (2019  4:43 PM)
 
+-----------------------------------------------------------------------+--------------+
| Impressions                                                           | Performed At |
+-----------------------------------------------------------------------+--------------+
|   1.    Small loculated pleural fluid collection seen overlying the   |   [facility]     |
| lateral left heart border in the lower left chest.    There may also  | RADIOLOGY    |
| be a small pleural effusion at the right lung base which is layering  |              |
| posteriorly.  2.    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         |              |
| Electronically signed by Eugenio Hoffman DO on 2019 4:59 PM       |              |
+-----------------------------------------------------------------------+--------------+
 
 
 
+------------------------------------------------------------------------+--------------+
| Narrative                                                              | Performed At |
+------------------------------------------------------------------------+--------------+
|   CHERIE VILLALOBOS  XR CHEST 1 VIEW  2019 4:43 PM     HISTORY: |   [facility]     |
|   68 years.    Female.    Chest pain.     TECHNIQUE:  1 view of the    | RADIOLOGY    |
| chest obtained at 1637 hours.     COMPARISON:  2018.             |              |
| FINDINGS:  A single lead ICD is seen over the right chest with an      |              |
| intact lead in proper position unchanged from the previous             |              |
| exam.    The sternotomy wires from prior CABG are noted.    An         |              |
| overlying EKG leads is seen.    A presumed loculated pleural fluid     |              |
| collection is seen overlying the left heart border similar to the      |              |
| previous exam.    This measures 3.0 x 5.2 cm in the transverse and     |              |
| craniocaudal dimensions.    Hazy opacity is seen in the lower right    |              |
| chest which may indicate the presence of a right-sided pleural         |              |
| effusion which is layering posteriorly.    No acute airspace disease,  |              |
| parenchymal nodule, mass or pneumothorax is noted.    Apical pleural   |              |
| thickening is seen.    The osseous structures are intact.              |              |
+------------------------------------------------------------------------+--------------+
 
 
 
+-------------------------------------------------------------------------------------------
--------------------------------------------------------------------------------------------
---------------------------------+
| Procedure Note                                                                            
                                                                                            
                                 |
+-------------------------------------------------------------------------------------------
--------------------------------------------------------------------------------------------
---------------------------------+
|   Lauro Baldwin Results In - 2019  5:04 PM [facility]  CHERIE APPLE VILLALOBOS CHEST 1           
                                                                                            
                                 |
| VIEW2019 4:43 PMHISTORY:68 years.  Female.  Chest pain.TECHNIQUE:1 view of the chest  
                                                                                            
                                 |
|  obtained at 1637 hours.COMPARISON:2018.FINDINGS:A single lead ICD is seen over     
                                                                                            
                                 |
| the right chest with an intact lead in proper position unchanged from the previous exam.  
                                                                                            
 
                                 |
|   The sternotomy wires from prior CABG are noted.  An overlying EKG leads is seen.  A     
                                                                                            
                                 |
| presumed loculated pleural fluid collection is seen overlying the left heart border       
                                                                                            
                                 |
| similar to the previous exam.  This measures 3.0 x 5.2 cm in the transverse and           
                                                                                            
                                 |
| craniocaudal dimensions.  Hazy opacity is seen in the lower right chest which may         
                                                                                            
                                 |
| indicate the presence of a right-sided pleural effusion which is layering posteriorly.    
                                                                                            
                                 |
| No acute airspace disease, parenchymal nodule, mass or pneumothorax is noted.  Apical     
                                                                                            
                                 |
| pleural thickening is seen.  The osseous structures are intact.IMPRESSION:1.  Small       
                                                                                            
                                 |
| loculated pleural fluid collection seen overlying the lateral left heart border in the    
                                                                                            
                                 |
| lower left chest.  There may also be a small pleural effusion at the right lung base      
                                                                                            
                                 |
| which is layering posteriorly.2.  Single lead ICD and prior CABG are                      
                                                                                            
                                 |
| noted.Electronically signed by Eugenio Hoffman DO on 2019 4:59 PM                     
                                                                                            
                                 |
|loculated pleural fluid collection is seen overlying the left heart border similar to the p
revious exam.  This measures 3.0 x 5.2 cm in the transverse and craniocaudal dimensions.  Ha
zy opacity is seen in the lower  |
|right chest which may indicate the presence of a right-sided pleural effusion which is laye
ring posteriorly.  No acute airspace disease, parenchymal nodule, mass or pneumothorax is no
ashly.  Apical pleural             |
|thickening is seen.  The osseous structures are intact.                                    
                                                                                            
                                 |
|                                                                                           
                                                                                            
                                 |
|IMPRESSION:                                                                                
                                                                                            
                                |
|1.  Small loculated pleural fluid collection seen overlying the lateral left heart border i
n the lower left chest.  There may also be a small pleural effusion at the right lung base w
hich is layering posteriorly.    |
|2.  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                                              
                                                                                            
                                 |
|                                                                                           
                                                                                            
                                 |
|Electronically signed by Eugenio Hoffman DO on 2019 4:59 PM                            
                                                                                            
 
                                 |
+-------------------------------------------------------------------------------------------
--------------------------------------------------------------------------------------------
---------------------------------+
 
 
 
+--------------------+------------------+--------------------+--------------+
| Performing         | Address          | City/State/Zipcode | Phone Number |
| Organization       |                  |                    |              |
+--------------------+------------------+--------------------+--------------+
|   [facility] |   [address] | [place] [address] |              |
+--------------------+------------------+--------------------+--------------+
 EKG STANDARD 12 LEAD (2019  4:30 PM)
 
+-------------------+--------------------------+-----------+--------------+
| Component         | Value                    | Ref Range | Performed At |
+-------------------+--------------------------+-----------+--------------+
| Ventricular Rate  | 76                       | BPM       | [facility]     |
+-------------------+--------------------------+-----------+--------------+
| Atrial Rate       | 76                       | BPM       | [facility]     |
+-------------------+--------------------------+-----------+--------------+
| P-R Interval      | 158                      | ms        | [facility]     |
+-------------------+--------------------------+-----------+--------------+
| QRS Duration      | 158                      | ms        | [facility]     |
+-------------------+--------------------------+-----------+--------------+
| Q-T Interval      | 490                      | ms        | [facility]     |
+-------------------+--------------------------+-----------+--------------+
| QTC Calculation   | 551                      | ms        | [facility]     |
| (Bezet)           |                          |           |              |
+-------------------+--------------------------+-----------+--------------+
| Calculated P Axis | 87                       | degrees   | [facility]     |
+-------------------+--------------------------+-----------+--------------+
| Calculated R Axis | 39                       | degrees   | [facility]     |
+-------------------+--------------------------+-----------+--------------+
| Calculated T Axis | 54                       | degrees   | [facility] E[facility]     |
+-------------------+--------------------------+-----------+--------------+
| Diagnosis         | Normal sinus rhythmLeft  |           | [facility]     |
|                   | bundle branch            |           |              |
|                   | blockAbnormal ECG        |           |              |
|                   | Confirmed by Elle    |           |              |
|                   | Celestino RODRIGES ([id number]) on     |           |              |
|                   | 2019 10:11:04 PM     |           |              |
+-------------------+--------------------------+-----------+--------------+
 
 
 
+--------------+-------------------+--------------------+--------------+
| Performing   | Address           | City/State/Zipcode | Phone Number |
| Organization |                   |                    |              |
+--------------+-------------------+--------------------+--------------+
|   [facility]   |   [address] | [place] [address] |              |
+--------------+-------------------+--------------------+--------------+
 CK MB (2019  4:10 PM)
 
+-------------+--------------------------+-----------------+-----------------+
| Component   | Value                    | Ref Range       | Performed At    |
+-------------+--------------------------+-----------------+-----------------+
| MMB         | 1.8                      | 0.5 - 3.6 ng/mL | [facility] |
+-------------+--------------------------+-----------------+-----------------+
 
| CK-MB Index | 8.2Comment:   CK INDEX   |                 | [facility] |
|             | INTERPRETATION:          |                 |                 |
|             |                 MMB      |                 |                 |
|             | ng/mL    &    Relative   |                 |                 |
|             | IndexNon-AMI             |                 |                 |
|             | < or =                   |                 |                 |
|             | 5.0                    N |                 |                 |
|             | AGray Zone               |                 |                 |
|             | >5.0                 <   |                 |                 |
|             | or =                     |                 |                 |
|             | 4.0AMI                   |                 |                 |
|             |                          |                 |                 |
|             | >5.0                     |                 |                 |
|             |     >4.0Testing          |                 |                 |
|             | performed at [facility];[address]     |                 |                 |
|             | [address];[place]   |                 |                 |
|             | [id number]                    |                 |                 |
+-------------+--------------------------+-----------------+-----------------+
 
 
 
+-------------------+------------------+--------------------+--------------+
| Performing        | Address          | City/State/Zipcode | Phone Number |
| Organization      |                  |                    |              |
+-------------------+------------------+--------------------+--------------+
|   [facility] |   [address] | [place] [address] |              |
+-------------------+------------------+--------------------+--------------+
 CPK (2019  4:10 PM)
 
+-----------+-------------------------+--------------+-----------------+
| Component | Value                   | Ref Range    | Performed At    |
+-----------+-------------------------+--------------+-----------------+
| CPK       | 22 (L)Comment: Testing  | 30 - 240 U/L | [facility] |
|           | performed at [facility];[address]    |              |                 |
|           | [address];[place]  |              |                 |
|           | [id number]                   |              |                 |
+-----------+-------------------------+--------------+-----------------+
 
 
 
+-------------------+------------------+--------------------+--------------+
| Performing        | Address          | City/State/Zipcode | Phone Number |
| Organization      |                  |                    |              |
+-------------------+------------------+--------------------+--------------+
|   [facility] |   [address] | [place] [address] |              |
+-------------------+------------------+--------------------+--------------+
 Troponin I (2019  4:10 PM)
 
+------------+--------------------------+-------------------+-----------------+
| Component  | Value                    | Ref Range         | Performed At    |
+------------+--------------------------+-------------------+-----------------+
| TROPONIN I | 0.318 (H)Comment:   0.00 | 0.00 - 0.10 ng/mL | [facility] |
|            |  to 0.10     CONSISTENT  |                   |                 |
|            | WITH NORMAL              |                   |                 |
|            | POPULATION0.11 to        |                   |                 |
|            | 0.60     CONSISTENT WITH |                   |                 |
|            |  INCREASED RISK FOR      |                   |                 |
|            | ADVERSE OUTCOMES>        |                   |                 |
|            | 0.60                     |                   |                 |
|            | CONSISTENT WITH WHO      |                   |                 |
 
|            | CRITERIA FOR ACUTE MI    |                   |                 |
|            | Testing performed at     |                   |                 |
|            | [facility];[address] [place] [address]            |                   |                 |
|            | [address];[place] [address]   |                   |                 |
+------------+--------------------------+-------------------+-----------------+
 
 
 
+----------+
| Specimen |
+----------+
| Blood    |
+----------+
 
 
 
+-------------------+------------------+--------------------+--------------+
| Performing        | Address          | City/State/Zipcode | Phone Number |
| Organization      |                  |                    |              |
+-------------------+------------------+--------------------+--------------+
|   [facility] |   [address] | [facility] [place] [address] |              |
+-------------------+------------------+--------------------+--------------+
 POCT glucose (2019  3:42 PM)
 
+--------------------+--------------------------+---------------+-----------------+
| Component          | Value                    | Ref Range     | Performed At    |
+--------------------+--------------------------+---------------+-----------------+
| GLUCOSE,POC SCREEN | 106 (H)Comment: Testing  | 65 - 99 mg/dL | [facility] |
|                    | performed at [facility];[address]     |               |                 |
|                    | [address];[place]   |               |                 |
|                    | [address]                    |               |                 |
+--------------------+--------------------------+---------------+-----------------+
 
 
 
+-------------------+------------------+--------------------+--------------+
| Performing        | Address          | City/State/Zipcode | Phone Number |
| Organization      |                  |                    |              |
+-------------------+------------------+--------------------+--------------+
|   [facility] |   [address] | [place] [address] |              |
+-------------------+------------------+--------------------+--------------+
 POCT glucose (2019 11:44 AM)
 
+--------------------+--------------------------+---------------+-----------------+
| Component          | Value                    | Ref Range     | Performed At    |
+--------------------+--------------------------+---------------+-----------------+
| GLUCOSE,POC SCREEN | 177 (H)Comment: Testing  | 65 - 99 mg/dL | [facility] |
|                    | performed at [facility];[address]     |               |                 |
|                    | [address];[place]   |               |                 |
|                    | [address]                    |               |                 |
+--------------------+--------------------------+---------------+-----------------+
 
 
 
+-------------------+------------------+--------------------+--------------+
| Performing        | Address          | City/State/Zipcode | Phone Number |
| Organization      |                  |                    |              |
+-------------------+------------------+--------------------+--------------+
|   [facility] |   [address] | [place] [address] |              |
+-------------------+------------------+--------------------+--------------+
 
 POCT glucose (2019  5:55 AM)
 
+--------------------+--------------------------+---------------+-----------------+
| Component          | Value                    | Ref Range     | Performed At    |
+--------------------+--------------------------+---------------+-----------------+
| GLUCOSE,POC SCREEN | 158 (H)Comment: Testing  | 65 - 99 mg/dL | [facility] |
|                    | performed at [facility];[address]     |               |                 |
|                    | [address];[place]   |               |                 |
|                    | [address]                    |               |                 |
+--------------------+--------------------------+---------------+-----------------+
 
 
 
+-------------------+------------------+--------------------+--------------+
| Performing        | Address          | City/State/Zipcode | Phone Number |
| Organization      |                  |                    |              |
+-------------------+------------------+--------------------+--------------+
|   [facility] |   [address] | [place] [address] |              |
+-------------------+------------------+--------------------+--------------+
 Comprehensive metabolic panel (2019  5:32 AM)
 
+----------------+--------------------------+-------------------+--------------+
| Component      | Value                    | Ref Range         | Performed At |
+----------------+--------------------------+-------------------+--------------+
| SODIUM         | 135                      | 135 - 145 mmol/L  | [facility]   |
|                |                          |                   | LABORATORY   |
+----------------+--------------------------+-------------------+--------------+
| POTASSIUM      | 4.8                      | 3.5 - 4.9 mmol/L  | [facility]   |
|                |                          |                   | LABORATORY   |
+----------------+--------------------------+-------------------+--------------+
| CHLORIDE       | 97 (L)                   | 99 - 109 mmol/L   | [facility]   |
|                |                          |                   | LABORATORY   |
+----------------+--------------------------+-------------------+--------------+
| CO2            | 25                       | 23 - 32 mmol/L    | [facility]   |
|                |                          |                   | LABORATORY   |
+----------------+--------------------------+-------------------+--------------+
| ANION GAP AGAP | 18                       | 5 - 20 mmol/L     | [facility]   |
|                |                          |                   | LABORATORY   |
+----------------+--------------------------+-------------------+--------------+
| GLUCOSE        | 166 (H)                  | 65 - 99 mg/dL     | [facility]   |
|                |                          |                   | LABORATORY   |
+----------------+--------------------------+-------------------+--------------+
| BUN            | 45 (H)                   | 8 - 25 mg/dL      | [facility]   |
|                |                          |                   | LABORATORY   |
+----------------+--------------------------+-------------------+--------------+
| CREATININE     | 8.1 (H)                  | 0.50 - 1.00 mg/dL | [facility]   |
|                |                          |                   | LABORATORY   |
+----------------+--------------------------+-------------------+--------------+
| BUN/CREAT      | 6                        |                   | [facility]   |
|                |                          |                   | LABORATORY   |
+----------------+--------------------------+-------------------+--------------+
| CALCIUM        | 9.1                      | 8.5 - 10.5 mg/dL  | [facility]   |
|                |                          |                   | LABORATORY   |
+----------------+--------------------------+-------------------+--------------+
| TOTAL PROTEIN  | 6.0 (L)                  | 6.3 - 8.2 g/dL    | [facility]   |
|                |                          |                   | LABORATORY   |
+----------------+--------------------------+-------------------+--------------+
| Albumin        | 2.3 (L)                  | 3.3 - 4.8 g/dL    | [facility]   |
|                |                          |                   | LABORATORY   |
+----------------+--------------------------+-------------------+--------------+
 
| GLOBULIN       | 3.7                      | 1.3 - 4.9 g/dL    | [facility]   |
|                |                          |                   | LABORATORY   |
+----------------+--------------------------+-------------------+--------------+
| A/G            | 0.6 (L)                  | 1.0 - 2.4         | [facility]   |
|                |                          |                   | LABORATORY   |
+----------------+--------------------------+-------------------+--------------+
| TBIL           | 0.4                      | 0.1 - 1.5 mg/dL   | [facility]   |
|                |                          |                   | LABORATORY   |
+----------------+--------------------------+-------------------+--------------+
| ALK PHOS       | 76                       | 35 - 115 U/L      | [facility]   |
|                |                          |                   | LABORATORY   |
+----------------+--------------------------+-------------------+--------------+
| AST            | 21                       | 10 - 45 U/L       | [facility]   |
|                |                          |                   | LABORATORY   |
+----------------+--------------------------+-------------------+--------------+
| ALT            | 16                       | 10 - 65 U/L       | [facility]   |
|                |                          |                   | LABORATORY   |
+----------------+--------------------------+-------------------+--------------+
| EGFR           | 5 (L)Comment: GFR <60:   | >60 mL/min/1.73m2 | [facility]   |
|                | CHRONIC KIDNEY DISEASE,  |                   | LABORATORY   |
|                | IF FOUND OVER A 3 MONTH  |                   |              |
|                | PERIOD.GFR <15: KIDNEY   |                   |              |
|                | FAILURE.FOR       |                   |              |
|                | [employer], MULTIPLY THE  |                   |              |
|                | CALCULATED GFR BY        |                   |              |
|                | 1.210.This eGFR is       |                   |              |
|                | calculated using the     |                   |              |
|                | MDRD IDMS traceable      |                   |              |
|                | equation.Testing         |                   |              |
|                | performed at [facility], [address] |                   |              |
|                |  [facility],        |                   |              |
|                | [place]    [address]   |                   |              |
+----------------+--------------------------+-------------------+--------------+
 
 
 
+----------+
| Specimen |
+----------+
| Blood    |
+----------+
 
 
 
+---------------+-------------------------+---------------------+----------------+
| Performing    | Address                 | City/State/Zipcode  | Phone Number   |
| Organization  |                         |                     |                |
+---------------+-------------------------+---------------------+----------------+
|   [facility]  |   [address]  | [place] [address] |   [phone] |
| LABORATORY    | [address]                   |                     |                |
+---------------+-------------------------+---------------------+----------------+
 CBC W/Auto Diff (Reflex to Manual) (2019  5:32 AM)
 
+-----------------+--------------------------+-------------------+--------------+
| Component       | Value                    | Ref Range         | Performed At |
+-----------------+--------------------------+-------------------+--------------+
| WBC             | 5.33                     | 3.80 - 11.00 K/uL | [facility]   |
|                 |                          |                   | LABORATORY   |
+-----------------+--------------------------+-------------------+--------------+
| RBC             | 2.66 (L)                 | 3.70 - 5.10 M/uL  | [facility]   |
 
|                 |                          |                   | LABORATORY   |
+-----------------+--------------------------+-------------------+--------------+
| HGB             | 8.9 (L)                  | 11.3 - 15.5 g/dL  | [facility]   |
|                 |                          |                   | LABORATORY   |
+-----------------+--------------------------+-------------------+--------------+
| HCT             | 27.6 (L)                 | 34.0 - 46.0 %     | [facility]   |
|                 |                          |                   | LABORATORY   |
+-----------------+--------------------------+-------------------+--------------+
| MCV             | 103.8 (H)                | 80.0 - 100.0 fl   | [facility]   |
|                 |                          |                   | LABORATORY   |
+-----------------+--------------------------+-------------------+--------------+
| MCH             | 33.3                     | 27.0 - 34.0 pg    | [facility]   |
|                 |                          |                   | LABORATORY   |
+-----------------+--------------------------+-------------------+--------------+
| MCHC            | 32.1                     | 32.0 - 35.5 g/dL  | [facility]   |
|                 |                          |                   | LABORATORY   |
+-----------------+--------------------------+-------------------+--------------+
| RDW SD          | 61.3 (H)                 | 37 - 53 fl        | [facility]   |
|                 |                          |                   | LABORATORY   |
+-----------------+--------------------------+-------------------+--------------+
| PLT             | 151                      | 150 - 400 K/uL    | [facility]   |
|                 |                          |                   | LABORATORY   |
+-----------------+--------------------------+-------------------+--------------+
| MPV             | 9.3                      | fl                | [facility]   |
|                 |                          |                   | LABORATORY   |
+-----------------+--------------------------+-------------------+--------------+
| DIFF TYPE       | AUTOMATED                |                   | [facility]   |
|                 |                          |                   | LABORATORY   |
+-----------------+--------------------------+-------------------+--------------+
| NEUTROPHILS     | 80.16                    | %                 | [facility]   |
|                 |                          |                   | LABORATORY   |
+-----------------+--------------------------+-------------------+--------------+
| LYMPHOCYTES     | 10.59                    | %                 | [facility]   |
|                 |                          |                   | LABORATORY   |
+-----------------+--------------------------+-------------------+--------------+
| MONOCYTES       | 8.80                     | %                 | [facility]   |
|                 |                          |                   | LABORATORY   |
+-----------------+--------------------------+-------------------+--------------+
| EOSINOPHILS     | 0.00                     | %                 | [facility]   |
|                 |                          |                   | LABORATORY   |
+-----------------+--------------------------+-------------------+--------------+
| BASOPHILS       | 0.45                     | %                 | [facility]   |
|                 |                          |                   | LABORATORY   |
+-----------------+--------------------------+-------------------+--------------+
| NEUTROPHILS ABS | 4.28                     | 1.90 - 7.40 K/uL  | [facility]   |
|                 |                          |                   | LABORATORY   |
+-----------------+--------------------------+-------------------+--------------+
| LYMPHOCYTES ABS | 0.57 (L)                 | 1.00 - 3.90 K/uL  | [facility]   |
|                 |                          |                   | LABORATORY   |
+-----------------+--------------------------+-------------------+--------------+
| MONOCYTES ABS   | 0.47                     | 0.00 - 0.80 K/uL  | [facility]   |
|                 |                          |                   | LABORATORY   |
+-----------------+--------------------------+-------------------+--------------+
| EOSINOPHILS ABS | 0.00                     | 0.00 - 0.50 K/uL  | [facility]   |
|                 |                          |                   | LABORATORY   |
+-----------------+--------------------------+-------------------+--------------+
| BASOPHILS ABS   | 0.02Comment: Testing     | 0.00 - 0.10 K/uL  | [facility]   |
|                 | performed at [facility], [address] |                   | LABORATORY   |
|                 |  [address],        |                   |              |
|                 | [place]  [address]     |                   |              |
 
+-----------------+--------------------------+-------------------+--------------+
 
 
 
+----------+
| Specimen |
+----------+
| Blood    |
+----------+
 
 
 
+---------------+-------------------------+---------------------+----------------+
| Performing    | Address                 | City/State/Zipcode  | Phone Number   |
| Organization  |                         |                     |                |
+---------------+-------------------------+---------------------+----------------+
|   [facility]  |   [address]  | [place] [address] |   [phone] |
| LABORATORY    | [address]                   |                     |                |
+---------------+-------------------------+---------------------+----------------+
 Sedimentation rate, automated (2019  5:32 AM)
 
+-----------+--------------------------+--------------+--------------+
| Component | Value                    | Ref Range    | Performed At |
+-----------+--------------------------+--------------+--------------+
| ESR       | 37 (H)Comment: Testing   | 0 - 30 mm/Hr | [facility]   |
|           | performed at [facility], [address] |              | LABORATORY   |
|           |  [address],        |              |              |
|           | [place]  [id number]     |              |              |
+-----------+--------------------------+--------------+--------------+
 
 
 
+----------+
| Specimen |
+----------+
| Blood    |
+----------+
 
 
 
+---------------+-------------------------+---------------------+----------------+
| Performing    | Address                 | City/State/Zipcode  | Phone Number   |
| Organization  |                         |                     |                |
+---------------+-------------------------+---------------------+----------------+
|   [facility]  |   [address]  | [place] [address] |   [phone] |
| LABORATORY    | [address]                   |                     |                |
+---------------+-------------------------+---------------------+----------------+
 C-reactive protein (2019  5:32 AM)
 
+-----------+--------------------------+------------+--------------+
| Component | Value                    | Ref Range  | Performed At |
+-----------+--------------------------+------------+--------------+
| CRP       | 7.1 (H)Comment: Testing  | <0.5 mg/dL | [facility]   |
|           | performed at [facility], [address] |            | LABORATORY   |
|           |  [place],        |            |              |
|           | [place]  [id number]     |            |              |
+-----------+--------------------------+------------+--------------+
 
 
 
 
+----------+
| Specimen |
+----------+
| Blood    |
+----------+
 
 
 
+---------------+-------------------------+---------------------+----------------+
| Performing    | Address                 | City/State/Zipcode  | Phone Number   |
| Organization  |                         |                     |                |
+---------------+-------------------------+---------------------+----------------+
|   [facility]  |   [address]  | [place] [address] |   [phone] |
| LABORATORY    | [address]vd[address]                   |                     |                |
+---------------+-------------------------+---------------------+----------------+
 CPK (2019  5:32 AM)
 
+-----------+-------------------------+--------------+-----------------+
| Component | Value                   | Ref Range    | Performed At    |
+-----------+-------------------------+--------------+-----------------+
| CPK       | 23 (L)Comment: Testing  | 30 - 240 U/L | [facility] LABORAT[facility] |
|           | performed at [facility];[address]    |              |                 |
|           | [address];[place], [place]  |              |                 |
|           | [id number]                   |              |                 |
+-----------+-------------------------+--------------+-----------------+
 
 
 
+----------+
| Specimen |
+----------+
| Blood    |
+----------+
 
 
 
+-------------------+------------------+--------------------+--------------+
| Performing        | Address          | City/State/Zipcode | Phone Number |
| Organization      |                  |                    |              |
+-------------------+------------------+--------------------+--------------+
|   [facility] LAB[facility] |   [address] | [place] [address] |              |
+-------------------+------------------+--------------------+--------------+
 CBC w/manual diff (2019  5:32 AM)
 
+----------------------+--------------------------------------------------------------------
-----+-------------------+--------------+
| Component            | Value                                                              
     | Ref Range         | Performed At |
+----------------------+--------------------------------------------------------------------
-----+-------------------+--------------+
| WBC                  | 5.33                                                               
     | 3.80 - 11.00 K/uL | [facility]   |
|                      |                                                                    
     |                   | LABORATORY   |
+----------------------+--------------------------------------------------------------------
-----+-------------------+--------------+
| RBC                  | 2.66 (L)                                                           
     | 3.70 - 5.10 M/uL  | [facility]   |
|                      |                                                                    
     |                   | LABORATORY   |
 
+----------------------+--------------------------------------------------------------------
-----+-------------------+--------------+
| HGB                  | 8.9 (L)                                                            
     | 11.3 - 15.5 g/dL  | [facility]   |
|                      |                                                                    
     |                   | LABORATORY   |
+----------------------+--------------------------------------------------------------------
-----+-------------------+--------------+
| HCT                  | 27.6 (L)                                                           
     | 34.0 - 46.0 %     | [facility]   |
|                      |                                                                    
     |                   | LABORATORY   |
+----------------------+--------------------------------------------------------------------
-----+-------------------+--------------+
| MCV                  | 103.8 (H)                                                          
     | 80.0 - 100.0 fl   | [facility]   |
|                      |                                                                    
     |                   | LABORATORY   |
+----------------------+--------------------------------------------------------------------
-----+-------------------+--------------+
| MCH                  | 33.3                                                               
     | 27.0 - 34.0 pg    | [facility]   |
|                      |                                                                    
     |                   | LABORATORY   |
+----------------------+--------------------------------------------------------------------
-----+-------------------+--------------+
| MCHC                 | 32.1                                                               
     | 32.0 - 35.5 g/dL  | [facility]   |
|                      |                                                                    
     |                   | LABORATORY   |
+----------------------+--------------------------------------------------------------------
-----+-------------------+--------------+
| RDW SD               | 61.3 (H)                                                           
     | 37 - 53 fl        | [facility]   |
|                      |                                                                    
     |                   | LABORATORY   |
+----------------------+--------------------------------------------------------------------
-----+-------------------+--------------+
| PLT                  | 151                                                                
     | 150 - 400 K/uL    | [facility]   |
|                      |                                                                    
     |                   | LABORATORY   |
+----------------------+--------------------------------------------------------------------
-----+-------------------+--------------+
| MPV                  | 9.3                                                                
     | fl                | [facility]   |
|                      |                                                                    
     |                   | LABORATORY   |
+----------------------+--------------------------------------------------------------------
-----+-------------------+--------------+
| DIFF TYPE            | MANUAL                                                             
     |                   | [facility]   |
|                      |                                                                    
     |                   | LABORATORY   |
+----------------------+--------------------------------------------------------------------
-----+-------------------+--------------+
| Neutrophils Manual   | 86                                                                 
     | %                 | [facility]   |
|                      |                                                                    
     |                   | LABORATORY   |
 
+----------------------+--------------------------------------------------------------------
-----+-------------------+--------------+
| Lymphocytes Manual   | 10                                                                 
     | %                 | [facility]   |
|                      |                                                                    
     |                   | LABORATORY   |
+----------------------+--------------------------------------------------------------------
-----+-------------------+--------------+
| Monocytes Manual     | 4                                                                  
     | %                 | [facility]   |
|                      |                                                                    
     |                   | LABORATORY   |
+----------------------+--------------------------------------------------------------------
-----+-------------------+--------------+
| Neutrophils Absolute | 4.59                                                               
     | 1.90 - 7.40 K/uL  | [facility]   |
|                      |                                                                    
     |                   | LABORATORY   |
+----------------------+--------------------------------------------------------------------
-----+-------------------+--------------+
| Lymphocytes Absolute | 0.53 (L)                                                           
     | 1.00 - 3.90 K/uL  | [facility]   |
|                      |                                                                    
     |                   | LABORATORY   |
+----------------------+--------------------------------------------------------------------
-----+-------------------+--------------+
| Monocytes Absolute   | 0.21                                                               
     | 0.00 - 0.80 K/uL  | [facility]   |
|                      |                                                                    
     |                   | LABORATORY   |
+----------------------+--------------------------------------------------------------------
-----+-------------------+--------------+
| MORPHOLOGY           | 1+Comment:                                                         
     |                   | [facility]   |
|                      | ANISO1+MACRONORMAL PLT                                             
     |                   | LABORATORY   |
|                      | MORPHTesting performed                                             
     |                   |              |
|                      | at [facility], [address]                                                     
     |                   |              |
|                      | [address],                                                   
     |                   |              |
|                      | [place]    [address]                                             
     |                   |              |
|                      |Testing performed at [facility], [address], [place], [place]    [phone] |                   |              |
|                      |                                                                    
     |                   |              |
+----------------------+--------------------------------------------------------------------
-----+-------------------+--------------+
 
 
 
+-------------------+
| Specimen          |
+-------------------+
| Blood - Arm, Left |
+-------------------+
 
 
 
 
+---------------+-------------------------+---------------------+----------------+
| Performing    | Address                 | City/State/Zipcode  | Phone Number   |
| Organization  |                         |                     |                |
+---------------+-------------------------+---------------------+----------------+
|   [facility]  |   [address]  | [place] [address] |   [phone] |
| LABORATORY    | [address]                   |                     |                |
+---------------+-------------------------+---------------------+----------------+
 POCT glucose (2018  9:04 PM)
 
+--------------------+--------------------------+---------------+-----------------+
| Component          | Value                    | Ref Range     | Performed At    |
+--------------------+--------------------------+---------------+-----------------+
| GLUCOSE,POC SCREEN | 230 (H)Comment: Testing  | 65 - 99 mg/dL | [facility] |
|                    | performed at [facility];[address]     |               |                 |
|                    | [address];[place], [place]   |               |                 |
|                    | [address]                    |               |                 |
+--------------------+--------------------------+---------------+-----------------+
 
 
 
+-------------------+------------------+--------------------+--------------+
| Performing        | Address          | City/State/Zipcode | Phone Number |
| Organization      |                  |                    |              |
+-------------------+------------------+--------------------+--------------+
|   [facility] |   [address] | [place] [address] |              |
+-------------------+------------------+--------------------+--------------+
 CT head without contrast (2018  5:23 PM)
 
+------------------------------------------------------------------------+--------------+
| Impressions                                                            | Performed At |
+------------------------------------------------------------------------+--------------+
|   1.    No acute intracranial pathology.     Electronically signed by  |   [facility]     |
| Steven Samano MD on 2018 5:28 PM                             | RADIOLOGY    |
+------------------------------------------------------------------------+--------------+
 
 
 
+------------------------------------------------------------------------+--------------+
| Narrative                                                              | Performed At |
+------------------------------------------------------------------------+--------------+
|   CHERIE VILLALOBOS  1950  68 years Female  CT HEAD WO CONTRAST |   [facility]     |
|   2018 5:23 PM     INDICATION: Altered mental status in a        | RADIOLOGY    |
| patient with acute osteomyelitis     COMPARISON: Head CT, 2017    |              |
|   TECHNIQUE: CT scan of the head without contrast.    5-mm axial       |              |
| noncontrast images were acquired from the foramen magnum through the   |              |
| cranial vertex. Multiplanar reformations were obtained from the        |              |
| acquisition data.     At least one of the following CT dose            |              |
| optimization techniques were used: Automated exposure control;         |              |
| Adjustment of mA and/or kV according to patient size; Use of iterative |              |
|  reconstruction technique.     FINDINGS:     Brain: No intracranial    |              |
| hemorrhage, midline shift or pathologic mass effect. No cerebral       |              |
| edema, mass lesion or evidence of acute infarct.     Ventricles and    |              |
| extra-axial fluid spaces: Normal.     Paranasal sinuses and mastoid    |              |
| air cells: Normal.     Calvarium and extracranial soft tissues:        |              |
| Normal.     Orbits: The patient is status post bilateral cataract      |              |
| surgery.                                                               |              |
+------------------------------------------------------------------------+--------------+
 
 
 
 
+-------------------------------------------------------------------------------------------
----------------------------------------------+
| Procedure Note                                                                            
                                              |
+-------------------------------------------------------------------------------------------
----------------------------------------------+
|   Denny, Rad Results In - 2018  5:33 PM PST  [id number]     
                                              |
| FemaleCT HEAD WO [id number]/31/2018 5:23 PMINDICATION: Altered mental status in a         
                                              |
| patient with acute osteomyelitisCOMPARISON: Head CT, 2017TECHNIQUE: CT scan of the   
                                              |
| head without contrast.  5-mm axial noncontrast images were acquired from the foramen      
                                              |
| magnum through the cranial vertex. Multiplanar reformations were obtained from the        
                                              |
| acquisition data.At least one of the following CT dose optimization techniques were       
                                              |
| used: Automated exposure control; Adjustment of mA and/or kV according to patient size;   
                                              |
| Use of iterative reconstruction technique.FINDINGS:Brain: No intracranial hemorrhage,     
                                              |
| midline shift or pathologic mass effect. No cerebral edema, mass lesion or evidence of    
                                              |
| acute infarct.Ventricles and extra-axial fluid spaces: Normal.Paranasal sinuses and       
                                              |
| mastoid air cells: Normal.Calvarium and extracranial soft tissues: Normal.Orbits: The     
                                              |
| patient is status post bilateral cataract surgery.IMPRESSION:1.  No acute intracranial    
                                              |
| pathology.Electronically signed by Steven Samano MD on 2018 5:28 PM             
                                              |
|FINDINGS:                                                                                  
                                             |
|Brain: No intracranial hemorrhage, midline shift or pathologic mass effect. No cerebral kt
ma, mass lesion or evidence of acute infarct. |
|                                                                                           
                                              |
|Ventricles and extra-axial fluid spaces: Normal.                                           
                                              |
|Paranasal sinuses and mastoid air cells: Normal.                                           
                                              |
|Calvarium and extracranial soft tissues: Normal.                                           
                                              |
|Orbits: The patient is status post bilateral cataract surgery.                             
                                              |
|IMPRESSION:                                                                                
 
                                             |
|1.  No acute intracranial pathology.                                                       
                                              |
|Electronically signed by Steven Samano MD on 2018 5:28 PM                        
                                              |
+-------------------------------------------------------------------------------------------
----------------------------------------------+
 
 
 
+--------------------+------------------+--------------------+--------------+
| Performing         | Address          | City/State/Zipcode | Phone Number |
| Organization       |                  |                    |              |
+--------------------+------------------+--------------------+--------------+
|   [facility] |   [address] | [place] [address] |              |
+--------------------+------------------+--------------------+--------------+
 POCT glucose (2018  4:34 PM)
 
+--------------------+--------------------------+---------------+-----------------+
| Component          | Value                    | Ref Range     | Performed At    |
+--------------------+--------------------------+---------------+-----------------+
| GLUCOSE,POC SCREEN | 160 (H)Comment: Testing  | 65 - 99 mg/dL | [facility] |
|                    | performed at [facility];[address]     |               |                 |
|                    | [address];[place]   |               |                 |
|                    | [address]                    |               |                 |
+--------------------+--------------------------+---------------+-----------------+
 
 
 
+-------------------+------------------+--------------------+--------------+
| Performing        | Address          | City/State/Zipcode | Phone Number |
| Organization      |                  |                    |              |
+-------------------+------------------+--------------------+--------------+
|   [facility] |   [address] | [place] [address] |              |
+-------------------+------------------+--------------------+--------------+
 POCT glucose (2018 11:17 AM)
 
+--------------------+--------------------------+---------------+-----------------+
| Component          | Value                    | Ref Range     | Performed At    |
+--------------------+--------------------------+---------------+-----------------+
| GLUCOSE,POC SCREEN | 241 (H)Comment: Testing  | 65 - 99 mg/dL | [facility] |
|                    | performed at [facility];[address]     |               |                 |
|                    | [address];[place]   |               |                 |
|                    | [address]                    |               |                 |
+--------------------+--------------------------+---------------+-----------------+
 
 
 
+-------------------+------------------+--------------------+--------------+
| Performing        | Address          | City/State/Zipcode | Phone Number |
| Organization      |                  |                    |              |
+-------------------+------------------+--------------------+--------------+
|   [facility] |   [address] | [place] [address] |              |
+-------------------+------------------+--------------------+--------------+
 POCT glucose (2018  6:26 AM)
 
+--------------------+--------------------------+---------------+-----------------+
| Component          | Value                    | Ref Range     | Performed At    |
 
+--------------------+--------------------------+---------------+-----------------+
| GLUCOSE,POC SCREEN | 141 (H)Comment: Testing  | 65 - 99 mg/dL | [facility] |
|                    | performed at [facility];[address]     |               |                 |
|                    | [address];[place]   |               |                 |
|                    | [id number]                    |               |                 |
+--------------------+--------------------------+---------------+-----------------+
 
 
 
+-------------------+------------------+--------------------+--------------+
| Performing        | Address          | City/State/Zipcode | Phone Number |
| Organization      |                  |                    |              |
+-------------------+------------------+--------------------+--------------+
|   [facility] |   [address] | [place] [address] |              |
+-------------------+------------------+--------------------+--------------+
 Vancomycin,Random (2018  6:17 AM)
 
+-------------------+-------------------------+-----------+-----------------+
| Component         | Value                   | Ref Range | Performed At    |
+-------------------+-------------------------+-----------+-----------------+
| VANCOMYCIN,RANDOM | 16.85Comment: Testing   | ug/mL     | [facility] |
|                   | performed at [facility];[address]    |           |                 |
|                   | [address];[place]  |           |                 |
|                   | [id number]                   |           |                 |
+-------------------+-------------------------+-----------+-----------------+
 
 
 
+----------+
| Specimen |
+----------+
| Blood    |
+----------+
 
 
 
+-------------------+------------------+--------------------+--------------+
| Performing        | Address          | City/State/Zipcode | Phone Number |
| Organization      |                  |                    |              |
+-------------------+------------------+--------------------+--------------+
|   [facility] |   [address] | [place] [address] |              |
+-------------------+------------------+--------------------+--------------+
 POCT glucose (2018  5:37 AM)
 
+--------------------+--------------------------+---------------+-----------------+
| Component          | Value                    | Ref Range     | Performed At    |
+--------------------+--------------------------+---------------+-----------------+
| GLUCOSE,POC SCREEN | 123 (H)Comment: Testing  | 65 - 99 mg/dL | [facility] |
|                    | performed at [facility];[address]     |               |                 |
|                    | [address];[place], [place]   |               |                 |
|                    | [address]                    |               |                 |
+--------------------+--------------------------+---------------+-----------------+
 
 
 
+-------------------+------------------+--------------------+--------------+
| Performing        | Address          | City/State/Zipcode | Phone Number |
| Organization      |                  |                    |              |
+-------------------+------------------+--------------------+--------------+
|   [facility] |   [address] | [place] [address] |              |
 
+-------------------+------------------+--------------------+--------------+
 POCT glucose (2018  9:10 PM)
 
+--------------------+--------------------------+---------------+-----------------+
| Component          | Value                    | Ref Range     | Performed At    |
+--------------------+--------------------------+---------------+-----------------+
| GLUCOSE,POC SCREEN | 154 (H)Comment: Testing  | 65 - 99 mg/dL | [facility] |
|                    | performed at [facility];[address]     |               |                 |
|                    | [address];[place]   |               |                 |
|                    | [id number]                    |               |                 |
+--------------------+--------------------------+---------------+-----------------+
 
 
 
+-------------------+------------------+--------------------+--------------+
| Performing        | Address          | City/State/Zipcode | Phone Number |
| Organization      |                  |                    |              |
+-------------------+------------------+--------------------+--------------+
|   [facility] |   [address] | [place] [address] |              |
+-------------------+------------------+--------------------+--------------+
 POCT glucose (2018  4:07 PM)
 
+--------------------+--------------------------+---------------+-----------------+
| Component          | Value                    | Ref Range     | Performed At    |
+--------------------+--------------------------+---------------+-----------------+
| GLUCOSE,POC SCREEN | 100 (H)Comment: Testing  | 65 - 99 mg/dL | [facility] |
|                    | performed at [facility];[address]     |               |                 |
|                    | [address];[place]   |               |                 |
|                    | [address]                    |               |                 |
+--------------------+--------------------------+---------------+-----------------+
 
 
 
+-------------------+------------------+--------------------+--------------+
| Performing        | Address          | City/State/Zipcode | Phone Number |
| Organization      |                  |                    |              |
+-------------------+------------------+--------------------+--------------+
|   [facility] |   [address] | [place] [address] |              |
+-------------------+------------------+--------------------+--------------+
 POCT glucose (2018 10:57 AM)
 
+--------------------+--------------------------+---------------+-----------------+
| Component          | Value                    | Ref Range     | Performed At    |
+--------------------+--------------------------+---------------+-----------------+
| GLUCOSE,POC SCREEN | 216 (H)Comment: Testing  | 65 - 99 mg/dL | [facility] |
|                    | performed at [facility];[address]     |               |                 |
|                    | [address];[place]   |               |                 |
|                    | [address]                    |               |                 |
+--------------------+--------------------------+---------------+-----------------+
 
 
 
+-------------------+------------------+--------------------+--------------+
| Performing        | Address          | City/State/Zipcode | Phone Number |
| Organization      |                  |                    |              |
+-------------------+------------------+--------------------+--------------+
|   [facility] |   [address] | [place] [address] |              |
+-------------------+------------------+--------------------+--------------+
 Comprehensive metabolic panel (2018  5:47 AM)
 
 
+----------------+--------------------------+-------------------+-----------------+
| Component      | Value                    | Ref Range         | Performed At    |
+----------------+--------------------------+-------------------+-----------------+
| SODIUM         | 135                      | 135 - 145 mmol/L  | [facility] |
+----------------+--------------------------+-------------------+-----------------+
| POTASSIUM      | 4.4                      | 3.5 - 4.9 mmol/L  | [facility] |
+----------------+--------------------------+-------------------+-----------------+
| CHLORIDE       | 95 (L)                   | 99 - 109 mmol/L   | [facility] |
+----------------+--------------------------+-------------------+-----------------+
| CO2            | 29                       | 23 - 32 mmol/L    | [facility] |
+----------------+--------------------------+-------------------+-----------------+
| ANION GAP AGAP | 15                       | 5 - 20 mmol/L     | [facility] |
+----------------+--------------------------+-------------------+-----------------+
| GLUCOSE        | 92                       | 65 - 99 mg/dL     | [facility] |
+----------------+--------------------------+-------------------+-----------------+
| BUN            | 21                       | 8 - 25 mg/dL      | [facility] |
+----------------+--------------------------+-------------------+-----------------+
| CREATININE     | 4.3 (H)                  | 0.50 - 1.00 mg/dL | [facility] |
+----------------+--------------------------+-------------------+-----------------+
| BUN/CREAT      | 5                        |                   | [facility] |
+----------------+--------------------------+-------------------+-----------------+
| CALCIUM        | 8.8                      | 8.5 - 10.5 mg/dL  | [facility] |
+----------------+--------------------------+-------------------+-----------------+
| TOTAL PROTEIN  | 6.3                      | 6.3 - 8.2 g/dL    | [facility] |
+----------------+--------------------------+-------------------+-----------------+
| Albumin        | 2.5 (L)                  | 3.3 - 4.8 g/dL    | [facility] |
+----------------+--------------------------+-------------------+-----------------+
| GLOBULIN       | 3.7                      | 1.3 - 4.9 g/dL    | [facility] |
+----------------+--------------------------+-------------------+-----------------+
| A/G            | 0.7 (L)                  | 1.0 - 2.4         | [facility] |
+----------------+--------------------------+-------------------+-----------------+
| TBIL           | 0.6                      | 0.1 - 1.5 mg/dL   | [facility] |
+----------------+--------------------------+-------------------+-----------------+
| ALK PHOS       | 76                       | 35 - 115 U/L      | [facility] |
+----------------+--------------------------+-------------------+-----------------+
| AST            | 24                       | 10 - 45 U/L       | [facility] |
+----------------+--------------------------+-------------------+-----------------+
| ALT            | 13                       | 10 - 65 U/L       | [facility] |
+----------------+--------------------------+-------------------+-----------------+
| EGFR           | 10 (L)Comment: GFR <60:  | >60 mL/min/1.73m2 | [facility] |
|                | CHRONIC KIDNEY DISEASE,  |                   |                 |
|                | IF FOUND OVER A 3 MONTH  |                   |                 |
|                | PERIOD.GFR <15: KIDNEY   |                   |                 |
|                | FAILURE.FOR       |                   |                 |
|                | [employer]IC[employer], MULTIPLY THE  |                   |                 |
|                | CALCULATED GFR BY        |                   |                 |
|                | 1.210.This eGFR is       |                   |                 |
|                | calculated using the     |                   |                 |
|                | MDRD [facility] traceable      |                   |                 |
|                | equation.Testing         |                   |                 |
|                | performed at [facility];[address]     |                   |                 |
|                | [address];[place], [place]   |                   |                 |
|                | [address]                    |                   |                 |
+----------------+--------------------------+-------------------+-----------------+
 
 
 
+----------+
| Specimen |
+----------+
 
| Blood    |
+----------+
 
 
 
+-------------------+------------------+--------------------+--------------+
| Performing        | Address          | City/State/Zipcode | Phone Number |
| Organization      |                  |                    |              |
+-------------------+------------------+--------------------+--------------+
|   [facility] |   [address] | [place] [address] |              |
+-------------------+------------------+--------------------+--------------+
 CBC W/Auto Diff (Reflex to Manual) (2018  5:47 AM)
 
+-----------------+-------------------------+-------------------+-----------------+
| Component       | Value                   | Ref Range         | Performed At    |
+-----------------+-------------------------+-------------------+-----------------+
| WBC             | 7.20                    | 3.80 - 11.00 K/uL | [facility] |
+-----------------+-------------------------+-------------------+-----------------+
| RBC             | 2.75 (L)                | 3.70 - 5.10 M/uL  | [facility] |
+-----------------+-------------------------+-------------------+-----------------+
| HGB             | 9.5 (L)                 | 11.3 - 15.5 g/dL  | [facility] |
+-----------------+-------------------------+-------------------+-----------------+
| HCT             | 28.7 (L)                | 34.0 - 46.0 %     | [facility] |
+-----------------+-------------------------+-------------------+-----------------+
| MCV             | 104.3 (H)               | 80.0 - 100.0 fl   | [facility] |
+-----------------+-------------------------+-------------------+-----------------+
| MCH             | 34.5 (H)                | 27.0 - 34.0 pg    | [facility] |
+-----------------+-------------------------+-------------------+-----------------+
| MCHC            | 33.1                    | 32.0 - 35.5 g/dL  | [facility] |
+-----------------+-------------------------+-------------------+-----------------+
| RDW SD          | 61.7 (H)                | 37 - 53 fl        | [facility] |
+-----------------+-------------------------+-------------------+-----------------+
| PLT             | 111 (L)                 | 150 - 400 K/uL    | [facility] |
+-----------------+-------------------------+-------------------+-----------------+
| MPV             | 8.3                     | fl                | [facility] |
+-----------------+-------------------------+-------------------+-----------------+
| DIFF TYPE       | AUTOMATED               |                   | [facility] |
+-----------------+-------------------------+-------------------+-----------------+
| NEUTROPHILS     | 76.57                   | %                 | [facility] |
+-----------------+-------------------------+-------------------+-----------------+
| LYMPHOCYTES     | 11.63                   | %                 | [facility] |
+-----------------+-------------------------+-------------------+-----------------+
| MONOCYTES       | 11.26                   | %                 | [facility] |
+-----------------+-------------------------+-------------------+-----------------+
| EOSINOPHILS     | 0.01                    | %                 | [facility] |
+-----------------+-------------------------+-------------------+-----------------+
| BASOPHILS       | 0.53                    | %                 | [facility] |
+-----------------+-------------------------+-------------------+-----------------+
| NEUTROPHILS ABS | 5.51                    | 1.90 - 7.40 K/uL  | [facility] |
+-----------------+-------------------------+-------------------+-----------------+
| LYMPHOCYTES ABS | 0.84 (L)                | 1.00 - 3.90 K/uL  | [facility] |
+-----------------+-------------------------+-------------------+-----------------+
| MONOCYTES ABS   | 0.81 (H)                | 0.00 - 0.80 K/uL  | [facility] |
+-----------------+-------------------------+-------------------+-----------------+
| EOSINOPHILS ABS | 0.00                    | 0.00 - 0.50 K/uL  | [facility] |
+-----------------+-------------------------+-------------------+-----------------+
| BASOPHILS ABS   | 0.04Comment: Testing    | 0.00 - 0.10 K/uL  | [facility] |
|                 | performed at [facility]    |                   |                 |
|                 | [address];[place], [place]  |                   |                 |
|                 | [id number]                   |                   |                 |
 
+-----------------+-------------------------+-------------------+-----------------+
 
 
 
+----------+
| Specimen |
+----------+
| Blood    |
+----------+
 
 
 
+-------------------+------------------+--------------------+--------------+
| Performing        | Address          | City/State/Zipcode | Phone Number |
| Organization      |                  |                    |              |
+-------------------+------------------+--------------------+--------------+
|   [facility] |   [address] | [place] [address] |              |
+-------------------+------------------+--------------------+--------------+
 POCT glucose (2018  5:15 AM)
 
+--------------------+-------------------------+---------------+-----------------+
| Component          | Value                   | Ref Range     | Performed At    |
+--------------------+-------------------------+---------------+-----------------+
| GLUCOSE,POC SCREEN | 99Comment: Testing      | 65 - 99 mg/dL | [facility] |
|                    | performed at [facility];[address]    |               |                 |
|                    | [address];[place]  |               |                 |
|                    | [address]                   |               |                 |
+--------------------+-------------------------+---------------+-----------------+
 
 
 
+-------------------+------------------+--------------------+--------------+
| Performing        | Address          | City/State/Zipcode | Phone Number |
| Organization      |                  |                    |              |
+-------------------+------------------+--------------------+--------------+
|   [facility] |   [address] | [place] [address] |              |
+-------------------+------------------+--------------------+--------------+
 POCT glucose (2018  8:29 PM)
 
+--------------------+--------------------------+---------------+-----------------+
| Component          | Value                    | Ref Range     | Performed At    |
+--------------------+--------------------------+---------------+-----------------+
| GLUCOSE,POC SCREEN | 182 (H)Comment: Testing  | 65 - 99 mg/dL | [facility] |
|                    | performed at [facility];[address]     |               |                 |
|                    | [address];[place]   |               |                 |
|                    | [address]                    |               |                 |
+--------------------+--------------------------+---------------+-----------------+
 
 
 
+-------------------+------------------+--------------------+--------------+
| Performing        | Address          | City/State/Zipcode | Phone Number |
| Organization      |                  |                    |              |
+-------------------+------------------+--------------------+--------------+
|   [facility] |   [address] | [place] [address] |              |
+-------------------+------------------+--------------------+--------------+
 POCT glucose (2018  4:44 PM)
 
+--------------------+--------------------------+---------------+-----------------+
| Component          | Value                    | Ref Range     | Performed At    |
 
+--------------------+--------------------------+---------------+-----------------+
| GLUCOSE,POC SCREEN | 146 (H)Comment: Testing  | 65 - 99 mg/dL | [facility] |
|                    | performed at [facility];[address]     |               |                 |
|                    | [address];[place]   |               |                 |
|                    | [address]                    |               |                 |
+--------------------+--------------------------+---------------+-----------------+
 
 
 
+-------------------+------------------+--------------------+--------------+
| Performing        | Address          | City/State/Zipcode | Phone Number |
| Organization      |                  |                    |              |
+-------------------+------------------+--------------------+--------------+
|   [facility] |   [address] | [place] [address] |              |
+-------------------+------------------+--------------------+--------------+
 POCT glucose (2018 11:38 AM)
 
+--------------------+--------------------------+---------------+-----------------+
| Component          | Value                    | Ref Range     | Performed At    |
+--------------------+--------------------------+---------------+-----------------+
| GLUCOSE,POC SCREEN | 139 (H)Comment: Testing  | 65 - 99 mg/dL | [facility] |
|                    | performed at [facility];[address]     |               |                 |
|                    | [address];[place]   |               |                 |
|                    | [address]                    |               |                 |
+--------------------+--------------------------+---------------+-----------------+
 
 
 
+-------------------+------------------+--------------------+--------------+
| Performing        | Address          | City/State/Zipcode | Phone Number |
| Organization      |                  |                    |              |
+-------------------+------------------+--------------------+--------------+
|   [facility] |   [address] | [place] [address] |              |
+-------------------+------------------+--------------------+--------------+
 Comprehensive metabolic panel (2018  5:50 AM)
 
+----------------+--------------------------+-------------------+--------------+
| Component      | Value                    | Ref Range         | Performed At |
+----------------+--------------------------+-------------------+--------------+
| SODIUM         | 137                      | 135 - 145 mmol/L  | [facility]   |
|                |                          |                   | [facility]   |
+----------------+--------------------------+-------------------+--------------+
| POTASSIUM      | 4.9                      | 3.5 - 4.9 mmol/L  | [facility]   |
|                |                          |                   | LAB[facility]   |
+----------------+--------------------------+-------------------+--------------+
| CHLORIDE       | 99                       | 99 - 109 mmol/L   | [facility]   |
|                |                          |                   | LAB[facility]   |
+----------------+--------------------------+-------------------+--------------+
| CO2            | 23                       | 23 - 32 mmol/L    | [facility]   |
|                |                          |                   | LAB[facility]   |
+----------------+--------------------------+-------------------+--------------+
| ANION GAP AGAP | 20                       | 5 - 20 mmol/L     | [facility]   |
|                |                          |                   | LAB[facility]   |
+----------------+--------------------------+-------------------+--------------+
| GLUCOSE        | 138 (H)                  | 65 - 99 mg/dL     | [facility]   |
|                |                          |                   | LAB[facility]   |
+----------------+--------------------------+-------------------+--------------+
| BUN            | 34 (H)                   | 8 - 25 mg/dL      | [facility]   |
|                |                          |                   | LAB[facility]   |
+----------------+--------------------------+-------------------+--------------+
 
| CREATININE     | 6.6 (H)                  | 0.50 - 1.00 mg/dL | [facility]   |
|                |                          |                   | LABORATORY   |
+----------------+--------------------------+-------------------+--------------+
| BUN/CREAT      | 5                        |                   | [facility]   |
|                |                          |                   | LABORATORY   |
+----------------+--------------------------+-------------------+--------------+
| CALCIUM        | 8.7                      | 8.5 - 10.5 mg/dL  | [facility]   |
|                |                          |                   | LABORATORY   |
+----------------+--------------------------+-------------------+--------------+
| TOTAL PROTEIN  | 6.1 (L)                  | 6.3 - 8.2 g/dL    | [facility]   |
|                |                          |                   | LABORATORY   |
+----------------+--------------------------+-------------------+--------------+
| Albumin        | 2.2 (L)                  | 3.3 - 4.8 g/dL    | [facility]   |
|                |                          |                   | LABORATORY   |
+----------------+--------------------------+-------------------+--------------+
| GLOBULIN       | 3.9                      | 1.3 - 4.9 g/dL    | [facility]   |
|                |                          |                   | LABORATORY   |
+----------------+--------------------------+-------------------+--------------+
| A/G            | 0.6 (L)                  | 1.0 - 2.4         | [facility]   |
|                |                          |                   | LABORATORY   |
+----------------+--------------------------+-------------------+--------------+
| TBIL           | 0.4                      | 0.1 - 1.5 mg/dL   | [facility]   |
|                |                          |                   | LABORATORY   |
+----------------+--------------------------+-------------------+--------------+
| ALK PHOS       | 82                       | 35 - 115 U/L      | [facility]   |
|                |                          |                   | LABORATORY   |
+----------------+--------------------------+-------------------+--------------+
| AST            | 16                       | 10 - 45 U/L       | [facility]   |
|                |                          |                   | LABORATORY   |
+----------------+--------------------------+-------------------+--------------+
| ALT            | 14                       | 10 - 65 U/L       | [facility]   |
|                |                          |                   | LABORATORY   |
+----------------+--------------------------+-------------------+--------------+
| EGFR           | 6 (L)Comment: GFR <60:   | >60 mL/min/1.73m2 | [facility]   |
|                | CHRONIC KIDNEY DISEASE,  |                   | LABORATORY   |
|                | IF FOUND OVER A 3 MONTH  |                   |              |
|                | PERIOD.GFR <15: KIDNEY   |                   |              |
|                | FAILURE.FOR       |                   |              |
|                | [employer], MULTIPLY THE  |                   |              |
|                | CALCULATED GFR BY        |                   |              |
|                | 1.210.This eGFR is       |                   |              |
|                | calculated using the     |                   |              |
|                | MDRD IDMS traceable      |                   |              |
|                | equation.Testing         |                   |              |
|                | performed at [facility], [address] |                   |              |
|                |  [facility],        |                   |              |
|                | [place]    [address]   |                   |              |
+----------------+--------------------------+-------------------+--------------+
 
 
 
+----------+
| Specimen |
+----------+
| Blood    |
+----------+
 
 
 
+---------------+-------------------------+---------------------+----------------+
 
| Performing    | Address                 | City/State/Zipcode  | Phone Number   |
| Organization  |                         |                     |                |
+---------------+-------------------------+---------------------+----------------+
|   [facility]  |   [address]  | [place] [address] |   [phone] |
| LABORATORY    | [address]                   |                     |                |
+---------------+-------------------------+---------------------+----------------+
 CBC W/Auto Diff (Reflex to Manual) (2018  5:50 AM)
 
+-----------------+--------------------------+-------------------+--------------+
| Component       | Value                    | Ref Range         | Performed At |
+-----------------+--------------------------+-------------------+--------------+
| WBC             | 5.62                     | 3.80 - 11.00 K/uL | [facility]   |
|                 |                          |                   | LABORATORY   |
+-----------------+--------------------------+-------------------+--------------+
| RBC             | 2.57 (L)                 | 3.70 - 5.10 M/uL  | [facility]   |
|                 |                          |                   | LABORATORY   |
+-----------------+--------------------------+-------------------+--------------+
| HGB             | 8.9 (L)                  | 11.3 - 15.5 g/dL  | [facility]   |
|                 |                          |                   | LABORATORY   |
+-----------------+--------------------------+-------------------+--------------+
| HCT             | 27.0 (L)                 | 34.0 - 46.0 %     | [facility]   |
|                 |                          |                   | LABORATORY   |
+-----------------+--------------------------+-------------------+--------------+
| MCV             | 105.1 (H)                | 80.0 - 100.0 fl   | [facility]   |
|                 |                          |                   | LABORATORY   |
+-----------------+--------------------------+-------------------+--------------+
| MCH             | 34.6 (H)                 | 27.0 - 34.0 pg    | [facility]   |
|                 |                          |                   | LABORATORY   |
+-----------------+--------------------------+-------------------+--------------+
| MCHC            | 32.9                     | 32.0 - 35.5 g/dL  | [facility]   |
|                 |                          |                   | LABORATORY   |
+-----------------+--------------------------+-------------------+--------------+
| RDW SD          | 63.0 (H)                 | 37 - 53 fl        | [facility]   |
|                 |                          |                   | LABORATORY   |
+-----------------+--------------------------+-------------------+--------------+
| PLT             | 133 (L)                  | 150 - 400 K/uL    | [facility]   |
|                 |                          |                   | LABORATORY   |
+-----------------+--------------------------+-------------------+--------------+
| MPV             | 8.4                      | fl                | [facility]   |
|                 |                          |                   | LABORATORY   |
+-----------------+--------------------------+-------------------+--------------+
| DIFF TYPE       | AUTOMATED                |                   | [facility]   |
|                 |                          |                   | LABORATORY   |
+-----------------+--------------------------+-------------------+--------------+
| NEUTROPHILS     | 76.00                    | %                 | [facility]   |
|                 |                          |                   | LABORATORY   |
+-----------------+--------------------------+-------------------+--------------+
| LYMPHOCYTES     | 13.76                    | %                 | [facility]   |
|                 |                          |                   | LABORATORY   |
+-----------------+--------------------------+-------------------+--------------+
| MONOCYTES       | 9.79                     | %                 | [facility]   |
|                 |                          |                   | LABORATORY   |
+-----------------+--------------------------+-------------------+--------------+
| EOSINOPHILS     | 0.03                     | %                 | [facility]   |
|                 |                          |                   | LABORATORY   |
+-----------------+--------------------------+-------------------+--------------+
| BASOPHILS       | 0.42                     | %                 | [facility]   |
|                 |                          |                   | LABORATORY   |
+-----------------+--------------------------+-------------------+--------------+
| NEUTROPHILS ABS | 4.27                     | 1.90 - 7.40 K/uL  | [facility]   |
 
|                 |                          |                   | LABORATORY   |
+-----------------+--------------------------+-------------------+--------------+
| LYMPHOCYTES ABS | 0.77 (L)                 | 1.00 - 3.90 K/uL  | [facility]   |
|                 |                          |                   | LABORATORY   |
+-----------------+--------------------------+-------------------+--------------+
| MONOCYTES ABS   | 0.55                     | 0.00 - 0.80 K/uL  | [facility]   |
|                 |                          |                   | LABORATORY   |
+-----------------+--------------------------+-------------------+--------------+
| EOSINOPHILS ABS | 0.00                     | 0.00 - 0.50 K/uL  | [facility]   |
|                 |                          |                   | LABORATORY   |
+-----------------+--------------------------+-------------------+--------------+
| BASOPHILS ABS   | 0.02Comment: Testing     | 0.00 - 0.10 K/uL  | [facility]   |
|                 | performed at [facility], [address] |                   | LABORATORY   |
|                 |  [address],        |                   |              |
|                 | [place]  [address]     |                   |              |
+-----------------+--------------------------+-------------------+--------------+
 
 
 
+----------+
| Specimen |
+----------+
| Blood    |
+----------+
 
 
 
+---------------+-------------------------+---------------------+----------------+
| Performing    | Address                 | City/State/Zipcode  | Phone Number   |
| Organization  |                         |                     |                |
+---------------+-------------------------+---------------------+----------------+
|   [facility]  |   [address]  | [place] [address] |   [phone] |
| LABORATORY    | [address]                   |                     |                |
+---------------+-------------------------+---------------------+----------------+
 POCT glucose (2018  5:09 AM)
 
+--------------------+--------------------------+---------------+-----------------+
| Component          | Value                    | Ref Range     | Performed At    |
+--------------------+--------------------------+---------------+-----------------+
| GLUCOSE,POC SCREEN | 125 (H)Comment: Testing  | 65 - 99 mg/dL | [facility]ORATORY |
|                    | performed at [facility];[address]     |               |                 |
|                    | [address];[place]   |               |                 |
|                    | [address]                    |               |                 |
+--------------------+--------------------------+---------------+-----------------+
 
 
 
+-------------------+------------------+--------------------+--------------+
| Performing        | Address          | City/State/Zipcode | Phone Number |
| Organization      |                  |                    |              |
+-------------------+------------------+--------------------+--------------+
|   [facility] LAB[facility] |   [address] | [facility] [place] [place] [address] |              |
+-------------------+------------------+--------------------+--------------+
 POCT glucose (2018  9:07 PM)
 
+--------------------+--------------------------+---------------+-----------------+
| Component          | Value                    | Ref Range     | Performed At    |
+--------------------+--------------------------+---------------+-----------------+
| GLUCOSE,POC SCREEN | 164 (H)Comment: Testing  | 65 - 99 mg/dL | [facility] |
|                    | performed at [facility];[address]     |               |                 |
 
|                    | [address];[place]   |               |                 |
|                    | [address]                    |               |                 |
+--------------------+--------------------------+---------------+-----------------+
 
 
 
+-------------------+------------------+--------------------+--------------+
| Performing        | Address          | City/State/Zipcode | Phone Number |
| Organization      |                  |                    |              |
+-------------------+------------------+--------------------+--------------+
|   [facility] LAB[facility] |   [address] | [place] [address] |              |
+-------------------+------------------+--------------------+--------------+
 POCT glucose (2018  4:31 PM)
 
+--------------------+--------------------------+---------------+-----------------+
| Component          | Value                    | Ref Range     | Performed At    |
+--------------------+--------------------------+---------------+-----------------+
| GLUCOSE,POC SCREEN | 277 (H)Comment: Testing  | 65 - 99 mg/dL | [facility] |
|                    | performed at [facility];[address]     |               |                 |
|                    | [address];[place]   |               |                 |
|                    | [address]                    |               |                 |
+--------------------+--------------------------+---------------+-----------------+
 
 
 
+-------------------+------------------+--------------------+--------------+
| Performing        | Address          | City/State/Zipcode | Phone Number |
| Organization      |                  |                    |              |
+-------------------+------------------+--------------------+--------------+
|   [facility] LAB[facility] |   [address] | [place] [address] |              |
+-------------------+------------------+--------------------+--------------+
 POCT glucose (2018 11:14 AM)
 
+--------------------+--------------------------+---------------+-----------------+
| Component          | Value                    | Ref Range     | Performed At    |
+--------------------+--------------------------+---------------+-----------------+
| GLUCOSE,POC SCREEN | 235 (H)Comment: Testing  | 65 - 99 mg/dL | [facility] |
|                    | performed at [facility];[address]     |               |                 |
|                    | [address] [facility];[place], [place]   |               |                 |
|                    | [address]                    |               |                 |
+--------------------+--------------------------+---------------+-----------------+
 
 
 
+-------------------+------------------+--------------------+--------------+
| Performing        | Address          | City/State/Zipcode | Phone Number |
| Organization      |                  |                    |              |
+-------------------+------------------+--------------------+--------------+
|   [facility] |   [address] | [place] [address] |              |
+-------------------+------------------+--------------------+--------------+
 X-ray foot left (2018  9:54 AM)
 
+------------------------------------------------------------------------+--------------+
| Impressions                                                            | Performed At |
+------------------------------------------------------------------------+--------------+
|      Amputation of the left forefoot at the level of the proximal      |   [facility]     |
| metatarsals. Surgical cutaneous staples are present.     No            | RADIOLOGY    |
| radiographic evidence for osteomyelitis. Normal alignment of the       |              |
| hindfoot. Mild degeneration of the midfoot.     Electronically signed  |              |
| by Oleksandr Lemus MD on 2018 10:12 AM                               |              |
 
+------------------------------------------------------------------------+--------------+
 
 
 
+------------------------------------------------------------------------+--------------+
| Narrative                                                              | Performed At |
+------------------------------------------------------------------------+--------------+
|   CHERIE AMBROSELENE DEYSI  1950  XR FOOT LEFT  2018 9:54 AM     |   [facility]     |
|  INDICATION: Osteomyelitis of the left foot.     COMPARISON: November  | RADIOLOGY    |
| 2018     TECHNIQUE: Left foot series, 3 views, PA, oblique and     |              |
| lateral views                                                          |              |
+------------------------------------------------------------------------+--------------+
 
 
 
+-------------------------------------------------------------------------------------------
-----------------------+
| Procedure Note                                                                            
                       |
+-------------------------------------------------------------------------------------------
-----------------------+
|   Denny, Lauro Results In - 2018 10:17 AM PST  CHERIE CHIU DEYSI1950XR FOOT      
                       |
| LEFT2018 9:54 AMINDICATION: Osteomyelitis of the left foot.COMPARISON: TECHNIQUE: Left foot series, 3 views, PA, oblique and lateral                     
                       |
| viewsIMPRESSION:Amputation of the left forefoot at the level of the proximal              
                       |
| metatarsals. Surgical cutaneous staples are present.No radiographic evidence for          
                       |
| osteomyelitis. Normal alignment of the hindfoot. Mild degeneration of the                 
                       |
| midfoot.Electronically signed by Oleksandr Lemus MD on 2018 10:12 AM                    
                       |
|COMPARISON: 2018                                                              
                       |
|TECHNIQUE: Left foot series, 3 views, PA, oblique and lateral views                        
                       |
|IMPRESSION:                                                                                
                      |
|Amputation of the left forefoot at the level of the proximal metatarsals. Surgical cutaneou
s staples are present. |
|                                                                                           
                       |
|No radiographic evidence for osteomyelitis. Normal alignment of the hindfoot. Mild degenera
tion of the midfoot.   |
|                                                                                           
                       |
|Electronically signed by Oleksandr Lemus MD on 2018 10:12 AM                             
                       |
+-------------------------------------------------------------------------------------------
-----------------------+
 
 
 
 
+--------------------+------------------+--------------------+--------------+
| Performing         | Address          | City/State/Zipcode | Phone Number |
| Organization       |                  |                    |              |
+--------------------+------------------+--------------------+--------------+
|   [facility] |   [address] | [place] [address] |              |
+--------------------+------------------+--------------------+--------------+
 Pathology histology - tissue (2018  8:00 AM)
 
+----------+
| Specimen |
+----------+
| Tissue   |
+----------+
 
 
 
+------------------------------------------------------------------------+--------------+
| Narrative                                                              | Performed At |
+------------------------------------------------------------------------+--------------+
|   SPECIMEN(S): A LEFT FOREFOOT  SPECIMEN SOURCE:  A. LEFT FOREFOOT     |   [facility]     |
| CLINICAL HISTORY:  Evaluate for osteomyelitis, first metatarsal.       | PATHOLOGY    |
| FINAL PATHOLOGIC DIAGNOSIS:  Left forefoot, amputation:                |              |
| -    Necrotic ulcer over the proximal medial first toe as described    |              |
| below           -    Acute and chronic osteomyelitis present in the    |              |
| area of ulceration           -    Additional skin changes on the       |              |
| plantar surface as described below           -    Histologically       |              |
| viable soft tissue and bone resection margins  MICROSCOPIC             |              |
| EXAMINATION:  Histologic sections of all submitted blocks are examined |              |
|  by light microscopy.    These findings, together with the gross       |              |
| examination, support the pathologic diagnosis.  GROSS DESCRIPTION:     |              |
| The specimen, labeled "LC, left forefoot," is received in formalin and |              |
|  consists of 10.5 x 9.6 x 4.3 cm distal left foot.    All 5 digits are |              |
|  present with yellow thickened nails.    The first digit  displays an  |              |
| irregular lateral flexion. The first digit nail is discolored and      |              |
| irregular.    The skin surface is pale pink and focally nodular with a |              |
|  red 2.2 x 1.2 cm pink ulcerated lesion present on the  medial aspect  |              |
| of the proximal first toe.    This ulcerated lesion exposes a tan      |              |
| smooth bone articulating surface and is 1.7 cm from the closest black  |              |
| inked soft tissue resection margin.    Adjacent to the  ulcerated      |              |
| lesion is an area of yellow-white thickened skin that is a 2.5 x 2.1   |              |
| cm.    The plantar aspect of the foot, proximal to the third and       |              |
| fourth digits is an area of shaggy white soft skin  measuring 3.1 x    |              |
| 2.4 x 0.4 cm.    This area is a 1.1 cm from the soft tissue resection  |              |
| margin.    Representative sections are submitted in four               |              |
| cassettes.    Tissue is placed into decal stat prior to  processing.   |              |
| Cassette summary:  (A1) ulcerated lesion to underlying soft tissue and |              |
|  bone  (A2) ulcerated lesion to adjacent thickened skin and shaggy     |              |
| white skin on plantar surface  (A3) distal tip of first digit to       |              |
| underlying soft tissue and bone  (A4) first metatarsal bone resection  |              |
| margin, shave.  FB (under the direct supervision of a pathologist)     |              |
|  The Gross Description was prepared using a voice recognition          |              |
| system.    The report was reviewed for accuracy; however, sound-alike  |              |
| word errors, addition and/or deletions may occur.    If there is any   |              |
| question about this report, please contact Client Services.            |              |
| PERFORMING LABORATORY:  Professional interpretation was performed by   |              |
| [employer], [facility], [address]     |              |
| [place] [address] (Medical Director:    Srinivasan Rojas M.D.;      |              |
| CLIA#:    [id number]).  The technical component was performed by       |              |
 
| [employer], [address] [place] [id number] (Medical       |              |
| Director: Clementina Ojeda MD; CLIA# [id number]).  Diagnostician:    Srinivasan BHATIA |              |
|  Bob RODRIGES  Pathologist  Electronically Signed 2018              |              |
+------------------------------------------------------------------------+--------------+
 
 
 
+--------------------+---------+--------------------+--------------+
| Performing         | Address | City/State/Zipcode | Phone Number |
| Organization       |         |                    |              |
+--------------------+---------+--------------------+--------------+
|   [facility] |         |                    |              |
+--------------------+---------+--------------------+--------------+
 Phosphorus (2018  6:18 AM)
 
+------------+--------------------------+-----------------+-----------------+
| Component  | Value                    | Ref Range       | Performed At    |
+------------+--------------------------+-----------------+-----------------+
| PHOSPHORUS | 4.9 (H)Comment: Testing  | 2.3 - 4.8 mg/dL | [facility] |
|            | performed at [facility]     |                 |                 |
|            | [address];[place], [place]   |                 |                 |
|            | [id number]                    |                 |                 |
+------------+--------------------------+-----------------+-----------------+
 
 
 
+----------+
| Specimen |
+----------+
| Blood    |
+----------+
 
 
 
+-------------------+------------------+--------------------+--------------+
| Performing        | Address          | City/State/Zipcode | Phone Number |
| Organization      |                  |                    |              |
+-------------------+------------------+--------------------+--------------+
|   [facility] |   [address] | [place] [address] |              |
+-------------------+------------------+--------------------+--------------+
 CBC w/auto diff (reflex to manual) (2018  6:18 AM)
 
+-----------------+-------------------------+-------------------+-----------------+
| Component       | Value                   | Ref Range         | Performed At    |
+-----------------+-------------------------+-------------------+-----------------+
| WBC             | 5.94                    | 3.80 - 11.00 K/uL | [facility] |
+-----------------+-------------------------+-------------------+-----------------+
| RBC             | 2.73 (L)                | 3.70 - 5.10 M/uL  | [facility] |
+-----------------+-------------------------+-------------------+-----------------+
| HGB             | 9.6 (L)                 | 11.3 - 15.5 g/dL  | [facility] |
+-----------------+-------------------------+-------------------+-----------------+
| HCT             | 28.3 (L)                | 34.0 - 46.0 %     | [facility] |
+-----------------+-------------------------+-------------------+-----------------+
| MCV             | 103.5 (H)               | 80.0 - 100.0 fl   | [facility] |
+-----------------+-------------------------+-------------------+-----------------+
| MCH             | 35.0 (H)                | 27.0 - 34.0 pg    | [facility] |
+-----------------+-------------------------+-------------------+-----------------+
| MCHC            | 33.8                    | 32.0 - 35.5 g/dL  | [facility] |
+-----------------+-------------------------+-------------------+-----------------+
| RDW SD          | 63.4 (H)                | 37 - 53 fl        | [facility] |
 
+-----------------+-------------------------+-------------------+-----------------+
| PLT             | 151                     | 150 - 400 K/uL    | [facility] |
+-----------------+-------------------------+-------------------+-----------------+
| MPV             | 8.0                     | fl                | [facility] |
+-----------------+-------------------------+-------------------+-----------------+
| DIFF TYPE       | AUTOMATED               |                   | [facility] |
+-----------------+-------------------------+-------------------+-----------------+
| NEUTROPHILS     | 81.69                   | %                 | [facility] |
+-----------------+-------------------------+-------------------+-----------------+
| LYMPHOCYTES     | 8.42                    | %                 | [facility] |
+-----------------+-------------------------+-------------------+-----------------+
| MONOCYTES       | 9.63                    | %                 | [facility] |
+-----------------+-------------------------+-------------------+-----------------+
| EOSINOPHILS     | 0.00                    | %                 | [facility] |
+-----------------+-------------------------+-------------------+-----------------+
| BASOPHILS       | 0.26                    | %                 | [facility] |
+-----------------+-------------------------+-------------------+-----------------+
| NEUTROPHILS ABS | 4.86                    | 1.90 - 7.40 K/uL  | [facility] |
+-----------------+-------------------------+-------------------+-----------------+
| LYMPHOCYTES ABS | 0.50 (L)                | 1.00 - 3.90 K/uL  | [facility] |
+-----------------+-------------------------+-------------------+-----------------+
| MONOCYTES ABS   | 0.57                    | 0.00 - 0.80 K/uL  | [facility] |
+-----------------+-------------------------+-------------------+-----------------+
| EOSINOPHILS ABS | 0.00                    | 0.00 - 0.50 K/uL  | [facility] |
+-----------------+-------------------------+-------------------+-----------------+
| BASOPHILS ABS   | 0.02Comment: Testing    | 0.00 - 0.10 K/uL  | [facility] |
|                 | performed at [facility];[address]    |                   |                 |
|                 | [address];[place]  |                   |                 |
|                 | [id number]                   |                   |                 |
+-----------------+-------------------------+-------------------+-----------------+
 
 
 
+----------+
| Specimen |
+----------+
| Blood    |
+----------+
 
 
 
+-------------------+------------------+--------------------+--------------+
| Performing        | Address          | City/State/Zipcode | Phone Number |
| Organization      |                  |                    |              |
+-------------------+------------------+--------------------+--------------+
|   [facility] |   [address] | [place] [address] |              |
+-------------------+------------------+--------------------+--------------+
 Comprehensive metabolic panel (2018  6:18 AM)
 
+----------------+--------------------------+-------------------+-----------------+
| Component      | Value                    | Ref Range         | Performed At    |
+----------------+--------------------------+-------------------+-----------------+
| SODIUM         | 134 (L)                  | 135 - 145 mmol/L  | [facility] |
+----------------+--------------------------+-------------------+-----------------+
| POTASSIUM      | 4.8                      | 3.5 - 4.9 mmol/L  | [facility] |
+----------------+--------------------------+-------------------+-----------------+
| CHLORIDE       | 99                       | 99 - 109 mmol/L   | [facility] |
+----------------+--------------------------+-------------------+-----------------+
| CO2            | 26                       | 23 - 32 mmol/L    | [facility] |
+----------------+--------------------------+-------------------+-----------------+
 
| ANION GAP AGAP | 14                       | 5 - 20 mmol/L     | [facility] |
+----------------+--------------------------+-------------------+-----------------+
| GLUCOSE        | 222 (H)                  | 65 - 99 mg/dL     | [facility] |
+----------------+--------------------------+-------------------+-----------------+
| BUN            | 22                       | 8 - 25 mg/dL      | [facility] |
+----------------+--------------------------+-------------------+-----------------+
| CREATININE     | 4.6 (H)                  | 0.50 - 1.00 mg/dL | [facility] |
+----------------+--------------------------+-------------------+-----------------+
| BUN/CREAT      | 5                        |                   | [facility] |
+----------------+--------------------------+-------------------+-----------------+
| CALCIUM        | 8.5                      | 8.5 - 10.5 mg/dL  | [facility] |
+----------------+--------------------------+-------------------+-----------------+
| TOTAL PROTEIN  | 6.1 (L)                  | 6.3 - 8.2 g/dL    | [facility] |
+----------------+--------------------------+-------------------+-----------------+
| Albumin        | 2.4 (L)                  | 3.3 - 4.8 g/dL    | [facility] |
+----------------+--------------------------+-------------------+-----------------+
| GLOBULIN       | 3.7                      | 1.3 - 4.9 g/dL    | [facility] |
+----------------+--------------------------+-------------------+-----------------+
| A/G            | 0.7 (L)                  | 1.0 - 2.4         | [facility] |
+----------------+--------------------------+-------------------+-----------------+
| TBIL           | 0.4                      | 0.1 - 1.5 mg/dL   | [facility] |
+----------------+--------------------------+-------------------+-----------------+
| ALK PHOS       | 95                       | 35 - 115 U/L      | [facility] |
+----------------+--------------------------+-------------------+-----------------+
| AST            | 23                       | 10 - 45 U/L       | [facility] |
+----------------+--------------------------+-------------------+-----------------+
| ALT            | 16                       | 10 - 65 U/L       | [facility] |
+----------------+--------------------------+-------------------+-----------------+
| EGFR           | 9 (L)Comment: GFR <60:   | >60 mL/min/1.73m2 | [facility] |
|                | CHRONIC KIDNEY DISEASE,  |                   |                 |
|                | IF FOUND OVER A 3 MONTH  |                   |                 |
|                | PERIOD.GFR <15: KIDNEY   |                   |                 |
|                | FAILURE.FOR       |                   |                 |
|                | [employer]IC[employer], MULTIPLY THE  |                   |                 |
|                | CALCULATED GFR BY        |                   |                 |
|                | 1.210.This eGFR is       |                   |                 |
|                | calculated using the     |                   |                 |
|                | MDRD IDMS traceable      |                   |                 |
|                | equation.Testing         |                   |                 |
|                | performed at [facility];[address]     |                   |                 |
|                | [address];[place]   |                   |                 |
|                | [id number]                    |                   |                 |
+----------------+--------------------------+-------------------+-----------------+
 
 
 
+----------+
| Specimen |
+----------+
| Blood    |
+----------+
 
 
 
+-------------------+------------------+--------------------+--------------+
| Performing        | Address          | City/State/Zipcode | Phone Number |
| Organization      |                  |                    |              |
+-------------------+------------------+--------------------+--------------+
|   [facility] |   [address] | [place] [address] |              |
+-------------------+------------------+--------------------+--------------+
 
 POCT glucose (2018  5:39 AM)
 
+--------------------+--------------------------+---------------+-----------------+
| Component          | Value                    | Ref Range     | Performed At    |
+--------------------+--------------------------+---------------+-----------------+
| GLUCOSE,POC SCREEN | 219 (H)Comment: Testing  | 65 - 99 mg/dL | [facility] |
|                    | performed at [facility];[address]     |               |                 |
|                    | [address];[place]   |               |                 |
|                    | [id number]                    |               |                 |
+--------------------+--------------------------+---------------+-----------------+
 
 
 
+-------------------+------------------+--------------------+--------------+
| Performing        | Address          | City/State/Zipcode | Phone Number |
| Organization      |                  |                    |              |
+-------------------+------------------+--------------------+--------------+
|   [facility] |   [address] | [place] [address] |              |
+-------------------+------------------+--------------------+--------------+
 POCT glucose (2018  9:01 PM)
 
+--------------------+--------------------------+---------------+-----------------+
| Component          | Value                    | Ref Range     | Performed At    |
+--------------------+--------------------------+---------------+-----------------+
| GLUCOSE,POC SCREEN | 190 (H)Comment: Testing  | 65 - 99 mg/dL | [facility] |
|                    | performed at [facility];[address]     |               |                 |
|                    | [address];[place], [place]   |               |                 |
|                    | [id number]                    |               |                 |
+--------------------+--------------------------+---------------+-----------------+
 
 
 
+-------------------+------------------+--------------------+--------------+
| Performing        | Address          | City/State/Zipcode | Phone Number |
| Organization      |                  |                    |              |
+-------------------+------------------+--------------------+--------------+
|   [facility] |   [address] | [place] [address] |              |
+-------------------+------------------+--------------------+--------------+
 POCT glucose (2018  2:08 PM)
 
+--------------------+--------------------------+---------------+-----------------+
| Component          | Value                    | Ref Range     | Performed At    |
+--------------------+--------------------------+---------------+-----------------+
| GLUCOSE,POC SCREEN | 227 (H)Comment: Testing  | 65 - 99 mg/dL | [facility] |
|                    | performed at [facility];[address]     |               |                 |
|                    | [address];[place]   |               |                 |
|                    | [address]                    |               |                 |
+--------------------+--------------------------+---------------+-----------------+
 
 
 
+-------------------+------------------+--------------------+--------------+
| Performing        | Address          | City/State/Zipcode | Phone Number |
| Organization      |                  |                    |              |
+-------------------+------------------+--------------------+--------------+
|   [facility] |   [address] | [place] [address] |              |
+-------------------+------------------+--------------------+--------------+
 Anaerobic Culture W/Gram Stain (2018  1:20 PM)
 
+----------------------+------------------------+-----------+-----------------+
 
| Component            | Value                  | Ref Range | Performed At    |
+----------------------+------------------------+-----------+-----------------+
| Specimen Description | WOUND                  |           | [facility]      |
|                      |                        |           | LABORATORY      |
+----------------------+------------------------+-----------+-----------------+
| SPECIAL REQUESTS     | FIRST METATARSAL LEFT  |           | [facility] |
|                      | FOOT                   |           |                 |
+----------------------+------------------------+-----------+-----------------+
| GRAM STAIN           | NO CELLS OR ORGANISMS  |           | [facility]      |
|                      | SEEN                   |           | LABORATORY      |
+----------------------+------------------------+-----------+-----------------+
| CULTURE              | 1+                     |           | [facility]      |
|                      |                        |           | LABORATORY      |
+----------------------+------------------------+-----------+-----------------+
| CULTURE              | NORMAL SKIN MARZENA      |           | [facility]      |
|                      | ISOLATED               |           | LABORATORY      |
+----------------------+------------------------+-----------+-----------------+
| CULTURE              | NO FURTHER WORKUP      |           | [facility]      |
|                      |                        |           | LABORATORY      |
+----------------------+------------------------+-----------+-----------------+
 
 
 
+----------+
| Specimen |
+----------+
| Wound    |
+----------+
 
 
 
+-------------------+-------------------------+---------------------+----------------+
| Performing        | Address                 | City/State/Zipcode  | Phone Number   |
| Organization      |                         |                     |                |
+-------------------+-------------------------+---------------------+----------------+
|   [facility]      |   [address]  | [place] [address] |   [phone] |
| LABORATORY        | [address]                   |                     |                |
+-------------------+-------------------------+---------------------+----------------+
|   [facility] |   [address]        | [place] [address]  |                |
+-------------------+-------------------------+---------------------+----------------+
 POCT glucose (2018 10:27 AM)
 
+--------------------+--------------------------+---------------+-----------------+
| Component          | Value                    | Ref Range     | Performed At    |
+--------------------+--------------------------+---------------+-----------------+
| GLUCOSE,POC SCREEN | 256 (H)Comment: Testing  | 65 - 99 mg/dL | [facility] |
|                    | performed at [facility];[address]     |               |                 |
|                    | [address];[place]   |               |                 |
|                    | [address]                    |               |                 |
+--------------------+--------------------------+---------------+-----------------+
 
 
 
+-------------------+------------------+--------------------+--------------+
| Performing        | Address          | City/State/Zipcode | Phone Number |
| Organization      |                  |                    |              |
+-------------------+------------------+--------------------+--------------+
|   [facility] |   [address] | [place] [address] |              |
+-------------------+------------------+--------------------+--------------+
 in this encounter
 
 
 Visit Diagnoses
 
 
+------------------------------------------------------------------------------------------+
| Diagnosis                                                                                |
+------------------------------------------------------------------------------------------+
|   Osteomyelitis of left foot (HCC) - Primary                                             |
+------------------------------------------------------------------------------------------+
|   Unspecified osteomyelitis, ankle and foot                                              |
+------------------------------------------------------------------------------------------+
|   Acute osteomyelitis of left ankle or foot (HCC)                                        |
+------------------------------------------------------------------------------------------+
|   Acute osteomyelitis, ankle and foot                                                    |
+------------------------------------------------------------------------------------------+
|   Prophylactic antibiotic                                                                |
+------------------------------------------------------------------------------------------+
|   Encounter for long-term (current) use of antibiotics                                   |
+------------------------------------------------------------------------------------------+
|   Anemia in ESRD (end-stage renal disease) (HCC)                                         |
+------------------------------------------------------------------------------------------+
|   Anemia in chronic kidney disease                                                       |
+------------------------------------------------------------------------------------------+
|   CAD (coronary artery disease)                                                          |
+------------------------------------------------------------------------------------------+
|   Coronary atherosclerosis of unspecified type of vessel, native or graft                |
+------------------------------------------------------------------------------------------+
|   Chronic anticoagulation                                                                |
+------------------------------------------------------------------------------------------+
|   Encounter for long-term (current) use of anticoagulants                                |
+------------------------------------------------------------------------------------------+
|   Chronic systolic congestive heart failure (HCC)                                        |
+------------------------------------------------------------------------------------------+
|   Chronic systolic heart failure                                                         |
+------------------------------------------------------------------------------------------+
|   ESRD (end stage renal disease) (HCC)                                                   |
+------------------------------------------------------------------------------------------+
|   End stage renal disease                                                                |
+------------------------------------------------------------------------------------------+
|   Hypertension, essential                                                                |
+------------------------------------------------------------------------------------------+
|   Unspecified essential hypertension                                                     |
+------------------------------------------------------------------------------------------+
|   Diabetic foot ulcer (HCC)                                                              |
+------------------------------------------------------------------------------------------+
|   Type II or unspecified type diabetes mellitus with other specified manifestations, not |
|  stated as uncontrolled                                                                  |
+------------------------------------------------------------------------------------------+
|   Paroxysmal atrial fibrillation (HCC)                                                   |
+------------------------------------------------------------------------------------------+
|   Atrial fibrillation                                                                    |
+------------------------------------------------------------------------------------------+
|   Type 2 diabetes mellitus with chronic kidney disease on chronic dialysis, with         |
| long-term current use of insulin (HCC)                                                   |
+------------------------------------------------------------------------------------------+
|   PVD (peripheral vascular disease) (formerly [facility])                                                |
+------------------------------------------------------------------------------------------+
|   Peripheral vascular disease, unspecified                                               |
+------------------------------------------------------------------------------------------+
|   Depression                                                                             |
 
+------------------------------------------------------------------------------------------+
|   Depressive disorder, not elsewhere classified                                          |
+------------------------------------------------------------------------------------------+
|   Cardiomyopathy (HCC)                                                                   |
+------------------------------------------------------------------------------------------+
|   Other primary cardiomyopathies                                                         |
+------------------------------------------------------------------------------------------+
|   S/P mitral valve repair                                                                |
+------------------------------------------------------------------------------------------+
|   Other postprocedural status                                                            |
+------------------------------------------------------------------------------------------+
|   S/P CABG x 2                                                                           |
+------------------------------------------------------------------------------------------+
|   Postsurgical aortocoronary bypass status                                               |
+------------------------------------------------------------------------------------------+
|   Secondary hyperparathyroidism (HCC)                                                    |
+------------------------------------------------------------------------------------------+
|   Secondary hyperparathyroidism (of renal origin)                                        |
+------------------------------------------------------------------------------------------+
 
 
 
 Admitting Diagnoses
 
 
+--------------------------------------------------------+
| Diagnosis                                              |
+--------------------------------------------------------+
|   Prophylactic antibiotic                              |
+--------------------------------------------------------+
|   Encounter for long-term (current) use of antibiotics |
+--------------------------------------------------------+
|   Anemia in ESRD (end-stage renal disease) (formerly [facility])       |
+--------------------------------------------------------+
|   Anemia in chronic kidney disease                     |
+--------------------------------------------------------+
|   Acute osteomyelitis of left ankle or foot (formerly [facility])      |
+--------------------------------------------------------+
|   Acute osteomyelitis, ankle and foot                  |
+--------------------------------------------------------+
 
 
 
 Administered Medications
 
 
+------------------+--------+---------+------+------+------+
| Medication Order | MAR    | Action  | Dose | Rate | Site |
|                  | Action | Date    |      |      |      |
+------------------+--------+---------+------+------+------+
 
 
 
+-----------------------------------+---+
|   acetaminophen (TYLENOL)         |   |
| suppository 650 mg  650 mg,       |   |
| Rectal, Every 6 Hours PRN, Mild   |   |
| Pain (1-3), Fever, Starting Thu   |   |
| 18 at 1538                  |   |
+-----------------------------------+---+
 
|                                   |   |
+-----------------------------------+---+
|   acetaminophen (TYLENOL) tablet  |   |
| 650 mg  650 mg, Oral, Every 6     |   |
| Hours PRN, Mild Pain (1-3),       |   |
| Fever, Starting Thu 18 at   |   |
| 1538                              |   |
+-----------------------------------+---+
|                                   |   |
+-----------------------------------+---+
 
 
 
+-----------------------------------+---------+----------+--------+---+-------+
|   albumin human 25 % solution     | New Bag | 20 | 12.5 g |   | Other |
| 12.5 g  12.5 g, Intravenous,      |         | 18 09:10 |        |   |       |
| Every 1 Hour PRN, Other, for      |         |  PST     |        |   |       |
| cramps or hypotension during      |         |          |        |   |       |
| dialysis., Starting Sat 18  |         |          |        |   |       |
| at 0759, DIALYSIS                 |         |          |        |   |       |
+-----------------------------------+---------+----------+--------+---+-------+
 
 
 
+---------+----------+--------+---+-------+
| New Bag | 20 | 12.5 g |   | Other |
|         | 18 10:41 |        |   |       |
|         |  PST     |        |   |       |
+---------+----------+--------+---+-------+
| New Bag | 2019 | 12.5 g |   |       |
|         |  15:45   |        |   |       |
|         | PST      |        |   |       |
+---------+----------+--------+---+-------+
 
 
 
+---+---+
|   |   |
+---+---+
 
 
 
+-----------------------------------+-------+----------+--------+---+---+
|   apixaban (ELIQUIS) tablet 2.5   | Given | 1/3/2019 | 2.5 mg |   |   |
| mg  2.5 mg, Oral, 2 Times Daily,  |       |  08:31   |        |   |   |
| First dose on Thu 18 at     |       | PST      |        |   |   |
| 2100                              |       |          |        |   |   |
+-----------------------------------+-------+----------+--------+---+---+
 
 
 
+-------+----------+--------+---+---+
| Given | 1/3/2019 | 2.5 mg |   |   |
|       |  20:35   |        |   |   |
|       | PST      |        |   |   |
+-------+----------+--------+---+---+
| Given | 2019 | 2.5 mg |   |   |
|       |  08:11   |        |   |   |
|       | PST      |        |   |   |
+-------+----------+--------+---+---+
 
 
 
 
+---+---+
|   |   |
+---+---+
 
 
 
+-----------------------------------+-------+----------+-------+---+---+
|   aspirin EC tablet 81 mg  81 mg, | Given | 2019 | 81 mg |   |   |
|  Oral, Daily With Breakfast,      |       |  07:42   |       |   |   |
| First dose on Thu 18 at     |       | PST      |       |   |   |
| 1600                              |       |          |       |   |   |
+-----------------------------------+-------+----------+-------+---+---+
 
 
 
+-------+----------+-------+---+---+
| Given | 1/3/2019 | 81 mg |   |   |
|       |  08:37   |       |   |   |
|       | PST      |       |   |   |
+-------+----------+-------+---+---+
| Given | 2019 | 81 mg |   |   |
|       |  08:11   |       |   |   |
|       | PST      |       |   |   |
+-------+----------+-------+---+---+
 
 
 
+---+---+
|   |   |
+---+---+
 
 
 
+-----------------------------------+-------+----------+--------+---+---+
|   aspirin tablet 325 mg  325 mg,  | Given | 2019 | 325 mg |   |   |
| Oral, Once, Tue 19 at 1800,   |       |  18:22   |        |   |   |
| For 1 dose                        |       | PST      |        |   |   |
+-----------------------------------+-------+----------+--------+---+---+
 
 
 
+---+---+
|   |   |
+---+---+
 
 
 
+----------------------------------+-------+----------+-------+---+---+
|   atorvastatin (LIPITOR) tablet  | Given | 20 | 20 mg |   |   |
| 20 mg  20 mg, Oral, Nightly,     |       | 18 20:23 |       |   |   |
| First dose on Thu 18 at    |       |  PST     |       |   |   |
| 2200                             |       |          |       |   |   |
+----------------------------------+-------+----------+-------+---+---+
 
 
 
+-------+----------+-------+---+---+
 
| Given | 20 | 20 mg |   |   |
|       | 18 21:12 |       |   |   |
|       |  PST     |       |   |   |
+-------+----------+-------+---+---+
| Given | 2019 | 20 mg |   |   |
|       |  22:00   |       |   |   |
|       | PST      |       |   |   |
+-------+----------+-------+---+---+
 
 
 
+---+---+
|   |   |
+---+---+
 
 
 
+----------------------------------+-------+----------+-------+---+---+
|   atorvastatin (LIPITOR) tablet  | Given | 2019 | 40 mg |   |   |
| 40 mg  40 mg, Oral, Nightly,     |       |  21:43   |       |   |   |
| First dose on 19 at 2200 |       | PST      |       |   |   |
+----------------------------------+-------+----------+-------+---+---+
 
 
 
+-------+----------+-------+---+---+
| Given | 1/3/2019 | 40 mg |   |   |
|       |  21:10   |       |   |   |
|       | PST      |       |   |   |
+-------+----------+-------+---+---+
 
 
 
+-----------------------------------+---+
|                                   |   |
+-----------------------------------+---+
|   bisacodyl (DULCOLAX)            |   |
| suppository 10 mg  10 mg, Rectal, |   |
|  Daily PRN, Constipation,         |   |
| Starting Fri 19 at 0800       |   |
+-----------------------------------+---+
|                                   |   |
+-----------------------------------+---+
 
 
 
+----------------------------------+-------+----------+--------+---+---+
|   calcium acetate (PHOSLO)       | Given | 1/3/2019 | 667 mg |   |   |
| capsule 667 mg  667 mg, Oral, 3  |       |  11:10   |        |   |   |
| Times Daily With Meals, First    |       | PST      |        |   |   |
| dose on Thu 18 at 1700     |       |          |        |   |   |
+----------------------------------+-------+----------+--------+---+---+
 
 
 
+-------+----------+--------+---+---+
| Given | 1/3/2019 | 667 mg |   |   |
|       |  16:06   |        |   |   |
|       | PST      |        |   |   |
+-------+----------+--------+---+---+
 
| Given | 2019 | 667 mg |   |   |
|       |  08:11   |        |   |   |
|       | PST      |        |   |   |
+-------+----------+--------+---+---+
 
 
 
+---+---+
|   |   |
+---+---+
 
 
 
+-----------------------------------+-------+----------+----------+---+---+
|   carvedilol (COREG) tablet 3.125 | Given | 1/3/2019 | 3.125 mg |   |   |
|  mg  3.125 mg, Oral, 2 Times      |       |  08:37   |          |   |   |
| Daily With Meals, First dose on   |       | PST      |          |   |   |
| Thu 18 at 1700              |       |          |          |   |   |
+-----------------------------------+-------+----------+----------+---+---+
 
 
 
+-------+----------+----------+---+---+
| Given | 1/3/2019 | 3.125 mg |   |   |
|       |  16:06   |          |   |   |
|       | PST      |          |   |   |
+-------+----------+----------+---+---+
| Given | 2019 | 3.125 mg |   |   |
|       |  08:11   |          |   |   |
|       | PST      |          |   |   |
+-------+----------+----------+---+---+
 
 
 
+---+---+
|   |   |
+---+---+
 
 
 
+-----------------------------------+-------+----------+-----+-------+---+
|   ceFEPIme (MAXIPIME) IVPB 2 g  2 | Given | 20 | 2 g | 100   |   |
|  g, Intravenous, Administer over  |       | 18 22:43 |     | mL/hr |   |
| 30 Minutes, Every 48 Hours, First |       |  PST     |     |       |   |
|  dose on Thu 18 at 2030, On |       |          |     |       |   |
|  dialysis days, administer after  |       |          |     |       |   |
| dialysis.                         |       |          |     |       |   |
+-----------------------------------+-------+----------+-----+-------+---+
 
 
 
+-------+----------+-----+-------+---+
| Given | 20 | 2 g | 100   |   |
|       | 18 20:30 |     | mL/hr |   |
|       |  PST     |     |       |   |
+-------+----------+-----+-------+---+
 
 
 
+---+---+
 
|   |   |
+---+---+
 
 
 
+-----------------------------------+-------+----------+-----+-------+---+
|   cefTAZidime (FORTAZ) 2 g in     | Given | 1/3/2019 | 2 g | 100   |   |
| sodium chloride (IV) 0.9 % 50 mL  |       |  20:35   |     | mL/hr |   |
| IVPB  2 g, Intravenous,           |       | PST      |     |       |   |
| Administer over 30 Minutes, After |       |          |     |       |   |
|  Hemodialysis (see admin          |       |          |     |       |   |
| instructions), Starting Tue       |       |          |     |       |   |
| 19 at 0000, Ceftazidime 2 gm  |       |          |     |       |   |
| iv after each hemodialysis        |       |          |     |       |   |
+-----------------------------------+-------+----------+-----+-------+---+
 
 
 
+---+---+
|   |   |
+---+---+
 
 
 
+-----------------------------------+-------+----------+-----+-------+---+
|   cefTAZidime (FORTAZ) 2 g in     | Given | 2019 | 2 g | 100   |   |
| sodium chloride (IV) 0.9 % 50 mL  |       |  18:27   |     | mL/hr |   |
| IVPB  2 g, Intravenous,           |       | PST      |     |       |   |
| Administer over 30 Minutes, Once, |       |          |     |       |   |
|  Tue 19 at 1600, For 1 dose,  |       |          |     |       |   |
| Ceftazidime 2 gm iv after each    |       |          |     |       |   |
| hemodialysis                      |       |          |     |       |   |
+-----------------------------------+-------+----------+-----+-------+---+
 
 
 
+---+---+
|   |   |
+---+---+
 
 
 
+-----------------------------------+-------+----------+-----+-------+---+
|   cefTAZidime (FORTAZ) 2 g in     | Given | 2019 | 2 g | 100   |   |
| sodium chloride (IV) 0.9 % 50 mL  |       |  16:08   |     | mL/hr |   |
| IVPB  2 g, Intravenous,           |       | PST      |     |       |   |
| Administer over 30 Minutes, Once, |       |          |     |       |   |
|  Wed 19 at 1500, For 1 dose,  |       |          |     |       |   |
| Give after dialysis               |       |          |     |       |   |
+-----------------------------------+-------+----------+-----+-------+---+
 
 
 
+---+---+
|   |   |
+---+---+
 
 
 
+-----------------------------------+-------+----------+-------+---+---+
 
|   clopidogrel (PLAVIX) tablet 75  | Given | 2019 | 75 mg |   |   |
| mg  75 mg, Oral, Daily, First     |       |  07:43   |       |   |   |
| dose on Thu 18 at 1600      |       | PST      |       |   |   |
+-----------------------------------+-------+----------+-------+---+---+
 
 
 
+-------+----------+-------+---+---+
| Given | 1/3/2019 | 75 mg |   |   |
|       |  08:31   |       |   |   |
|       | PST      |       |   |   |
+-------+----------+-------+---+---+
| Given | 2019 | 75 mg |   |   |
|       |  08:11   |       |   |   |
|       | PST      |       |   |   |
+-------+----------+-------+---+---+
 
 
 
+---+---+
|   |   |
+---+---+
 
 
 
+----------------------------------+-------+----------+---------+---+-------+
|   epoetin byron (PROCRIT)         | Given | 20 | 10,000  |   | Other |
| injection 10,000 Units  10,000   |       | 18 10:17 | Units   |   |       |
| Units, Intravenous, Once In      |       |  PST     |         |   |       |
| Dialysis, Sat 18 at 0830,  |       |          |         |   |       |
| For 1 dose, DIALYSIS             |       |          |         |   |       |
+----------------------------------+-------+----------+---------+---+-------+
 
 
 
+---+---+
|   |   |
+---+---+
 
 
 
+-----------------------------------+-------+----------+---------+---+---+
|   epoetin byron (PROCRIT)          | Given | 2019 | 10,000  |   |   |
| injection 10,000 Units  10,000    |       |  14:45   | Units   |   |   |
| Units, Intravenous, Once In       |       | PST      |         |   |   |
| Dialysis, Tue 19 at 1430, For |       |          |         |   |   |
|  1 dose, DIALYSIS                 |       |          |         |   |   |
+-----------------------------------+-------+----------+---------+---+---+
 
 
 
+---+---+
|   |   |
+---+---+
 
 
 
+-----------------------------------+-------+----------+---------+---+---+
|   epoetin byron (PROCRIT)          | Given | 1/3/2019 | 10,000  |   |   |
| injection 10,000 Units  10,000    |       |  18:30   | Units   |   |   |
 
| Units, Intravenous, Once In       |       | PST      |         |   |   |
| Dialysis, Thu 1/3/19 at 1730, For |       |          |         |   |   |
|  1 dose, DIALYSIS                 |       |          |         |   |   |
+-----------------------------------+-------+----------+---------+---+---+
 
 
 
+---+---+
|   |   |
+---+---+
 
 
 
+-----------------------------------+-------+----------+--------+---+---+
|   fentaNYL (SUBLIMAZE) injection  | Given | 20 | 50 mcg |   |   |
| 50 mcg  50 mcg, Intravenous,      |       | 18 15:03 |        |   |   |
| Every 5 Min PRN, Pain, Option One |       |  PST     |        |   |   |
|  for pain scale 5-10/10. If no    |       |          |        |   |   |
| relief, proceed to option 2.,     |       |          |        |   |   |
| Starting Thu 18 at 1347,    |       |          |        |   |   |
| PACU                              |       |          |        |   |   |
+-----------------------------------+-------+----------+--------+---+---+
 
 
 
+---+---+
|   |   |
+---+---+
 
 
 
+-----------------------------------+-------+----------+-------+---+---+
|   furosemide (LASIX) tablet 20 mg | Given | 2019 | 20 mg |   |   |
|   20 mg, Oral, Daily, First dose  |       |  07:44   |       |   |   |
| on u 18 at 1600           |       | PST      |       |   |   |
+-----------------------------------+-------+----------+-------+---+---+
 
 
 
+-------+----------+-------+---+---+
| Given | 1/3/2019 | 20 mg |   |   |
|       |  08:31   |       |   |   |
|       | PST      |       |   |   |
+-------+----------+-------+---+---+
| Given | 2019 | 20 mg |   |   |
|       |  08:11   |       |   |   |
|       | PST      |       |   |   |
+-------+----------+-------+---+---+
 
 
 
+---+---+
|   |   |
+---+---+
 
 
 
+----------------------------------+-------+----------+----------+---+---+
|   HYDROcodone-acetaminophen      | Given | 20 | 1 tablet |   |   |
| (NORCO)  MG per tablet 1   |       | 18 07:21 |          |   |   |
 
| tablet  1 tablet, Oral, Every 4  |       |  PST     |          |   |   |
| Hours PRN, Severe Pain (7-10),   |       |          |          |   |   |
| Starting Thu 18 at 1844    |       |          |          |   |   |
+----------------------------------+-------+----------+----------+---+---+
 
 
 
+-------+----------+----------+---+---+
| Given | 20 | 1 tablet |   |   |
|       | 18 14:46 |          |   |   |
|       |  PST     |          |   |   |
+-------+----------+----------+---+---+
| Given | 1/3/2019 | 1 tablet |   |   |
|       |  10:50   |          |   |   |
|       | PST      |          |   |   |
+-------+----------+----------+---+---+
 
 
 
+---+---+
|   |   |
+---+---+
 
 
 
+----------------------------------+-------+----------+----------+---+---+
|   HYDROcodone-acetaminophen      | Given | 1/3/2019 | 1 tablet |   |   |
| (NORCO) 5-325 MG per tablet 1    |       |  14:19   |          |   |   |
| tablet  1 tablet, Oral, Every 4  |       | PST      |          |   |   |
| Hours PRN, Moderate Pain (4-6),  |       |          |          |   |   |
| Starting Thu 18 at 1844    |       |          |          |   |   |
+----------------------------------+-------+----------+----------+---+---+
 
 
 
+-------+----------+----------+---+---+
| Given | 1/3/2019 | 1 tablet |   |   |
|       |  21:30   |          |   |   |
|       | PST      |          |   |   |
+-------+----------+----------+---+---+
| Given | 2019 | 1 tablet |   |   |
|       |  08:12   |          |   |   |
|       | PST      |          |   |   |
+-------+----------+----------+---+---+
 
 
 
+---+---+
|   |   |
+---+---+
 
 
 
+-----------------------------------+-------+----------+----------+---+---+
|   insulin glargine (LANTUS)       | Given | 2019 | 20 Units |   |   |
| injection 20 Units  20 Units,     |       |  22:00   |          |   |   |
| Subcutaneous, Nightly, First dose |       | PST      |          |   |   |
|  on Thu 18 at 2200          |       |          |          |   |   |
+-----------------------------------+-------+----------+----------+---+---+
 
 
 
 
+-------+----------+----------+---+---+
| Given | 2019 | 20 Units |   |   |
|       |  21:44   |          |   |   |
|       | PST      |          |   |   |
+-------+----------+----------+---+---+
| Given | 1/3/2019 | 20 Units |   |   |
|       |  21:29   |          |   |   |
|       | PST      |          |   |   |
+-------+----------+----------+---+---+
 
 
 
+---+---+
|   |   |
+---+---+
 
 
 
+-----------------------------------+-------+----------+---------+---+---+
|   insulin lispro (human)          | Given | 2019 | 2 Units |   |   |
| (HUMALOG) injection 0-10 Units    |       |  12:03   |         |   |   |
| 0-10 Units, Subcutaneous, 3 Times |       | PST      |         |   |   |
|  Daily Before Meals, First dose   |       |          |         |   |   |
| on Thu 18 at 1630           |       |          |         |   |   |
+-----------------------------------+-------+----------+---------+---+---+
 
 
 
+----------------+----------+---------+---+---+
| Given          | 2019 | 2 Units |   |   |
|                |  16:45   |         |   |   |
|                | PST      |         |   |   |
+----------------+----------+---------+---+---+
| Given by Other | 1/3/2019 | 2 Units |   |   |
|                |  16:10   |         |   |   |
|                | PST      |         |   |   |
+----------------+----------+---------+---+---+
 
 
 
+---+---+
|   |   |
+---+---+
 
 
 
+-----------------------------------+-------+----------+---------+---+---+
|   insulin lispro (human)          | Given | 20 | 1 Units |   |   |
| (HUMALOG) injection 0-5 Units     |       | 18 21:16 |         |   |   |
| 0-5 Units, Subcutaneous, Nightly, |       |  PST     |         |   |   |
|  First dose on Thu 18 at    |       |          |         |   |   |
| 2200                              |       |          |         |   |   |
+-----------------------------------+-------+----------+---------+---+---+
 
 
 
+-------+----------+---------+---+---+
| Given | 2019 | 1 Units |   |   |
 
|       |  21:44   |         |   |   |
|       | PST      |         |   |   |
+-------+----------+---------+---+---+
 
 
 
+---+---+
|   |   |
+---+---+
 
 
 
+-----------------------------------+-------+----------+-------+---+---+
|   isosorbide mononitrate (IMDUR)  | Given | 2019 | 60 mg |   |   |
| 24 hr tablet 60 mg  60 mg, Oral,  |       |  07:43   |       |   |   |
| Daily, First dose on Thu 18 |       | PST      |       |   |   |
|  at 1600                          |       |          |       |   |   |
+-----------------------------------+-------+----------+-------+---+---+
 
 
 
+-------+----------+-------+---+---+
| Given | 1/3/2019 | 60 mg |   |   |
|       |  08:30   |       |   |   |
|       | PST      |       |   |   |
+-------+----------+-------+---+---+
| Given | 2019 | 60 mg |   |   |
|       |  08:11   |       |   |   |
|       | PST      |       |   |   |
+-------+----------+-------+---+---+
 
 
 
+---+---+
|   |   |
+---+---+
 
 
 
+-----------------------------------+-------+----------+------+---+---+
|   LORazepam (ATIVAN) tablet 1 mg  | Given | 1/3/2019 | 1 mg |   |   |
|  1 mg, Oral, Daily PRN, Anxiety,  |       |  00:55   |      |   |   |
| Starting Thu 18 at 1538     |       | PST      |      |   |   |
+-----------------------------------+-------+----------+------+---+---+
 
 
 
+----------------+----------+------+---+---+
| Given by Other | 1/3/2019 | 1 mg |   |   |
|                |  16:25   |      |   |   |
|                | PST      |      |   |   |
+----------------+----------+------+---+---+
| Given          | 2019 | 1 mg |   |   |
|                |  10:58   |      |   |   |
|                | PST      |      |   |   |
+----------------+----------+------+---+---+
 
 
 
+---+---+
 
|   |   |
+---+---+
 
 
 
+-----------------------------------+-------+----------+--------+---+---+
|   losartan (COZAAR) tablet 100 mg | Given | 2019 | 100 mg |   |   |
|   100 mg, Oral, Daily, First dose |       |  07:42   |        |   |   |
|  on Thu 18 at 1630          |       | PST      |        |   |   |
+-----------------------------------+-------+----------+--------+---+---+
 
 
 
+-------+----------+--------+---+---+
| Given | 1/3/2019 | 100 mg |   |   |
|       |  08:31   |        |   |   |
|       | PST      |        |   |   |
+-------+----------+--------+---+---+
| Given | 2019 | 100 mg |   |   |
|       |  08:11   |        |   |   |
|       | PST      |        |   |   |
+-------+----------+--------+---+---+
 
 
 
+---+---+
|   |   |
+---+---+
 
 
 
+-----------------------------------+-------+----------+--------+---+---+
|   nitroGLYCERIN (NITROSTAT) SL    | Given | 2019 | 0.4 mg |   |   |
| tablet 0.4 mg  0.4 mg,            |       |  17:34   |        |   |   |
| Sublingual, Every 5 Min PRN,      |       | PST      |        |   |   |
| Chest pain, Starting Thu 18 |       |          |        |   |   |
|  at 1538                          |       |          |        |   |   |
+-----------------------------------+-------+----------+--------+---+---+
 
 
 
+----------------+----------+--------+---+---+
| Given          | 1/3/2019 | 0.4 mg |   |   |
|                |  11:26   |        |   |   |
|                | PST      |        |   |   |
+----------------+----------+--------+---+---+
| Given by Other | 2019 | 0.4 mg |   |   |
|                |  05:28   |        |   |   |
|                | PST      |        |   |   |
+----------------+----------+--------+---+---+
 
 
 
+---+---+
|   |   |
+---+---+
 
 
 
+-----------------------------------+-------+----------+-------+---+---+
 
|   nortriptyline (PAMELOR) capsule | Given | 2019 | 25 mg |   |   |
|  25 mg  25 mg, Oral, Daily, First |       |  07:43   |       |   |   |
|  dose on Thu 18 at 1630     |       | PST      |       |   |   |
+-----------------------------------+-------+----------+-------+---+---+
 
 
 
+-------+----------+-------+---+---+
| Given | 1/3/2019 | 25 mg |   |   |
|       |  08:30   |       |   |   |
|       | PST      |       |   |   |
+-------+----------+-------+---+---+
| Given | 2019 | 25 mg |   |   |
|       |  08:11   |       |   |   |
|       | PST      |       |   |   |
+-------+----------+-------+---+---+
 
 
 
+---+---+
|   |   |
+---+---+
 
 
 
+-----------------------------------+-------+----------+-------+---+---+
|   nortriptyline (PAMELOR) capsule | Given | 2019 | 50 mg |   |   |
|  50 mg  50 mg, Oral, Nightly,     |       |  22:02   |       |   |   |
| First dose on Tue 19 at 2200  |       | PST      |       |   |   |
+-----------------------------------+-------+----------+-------+---+---+
 
 
 
+-------+----------+-------+---+---+
| Given | 2019 | 50 mg |   |   |
|       |  21:44   |       |   |   |
|       | PST      |       |   |   |
+-------+----------+-------+---+---+
| Given | 1/3/2019 | 50 mg |   |   |
|       |  21:10   |       |   |   |
|       | PST      |       |   |   |
+-------+----------+-------+---+---+
 
 
 
+---+---+
|   |   |
+---+---+
 
 
 
+-----------------------------------+-------+----------+-------+---+---+
|   nortriptyline (PAMELOR) capsule | Given | 20 | 75 mg |   |   |
|  75 mg  75 mg, Oral, Nightly,     |       | 18 20:30 |       |   |   |
| First dose on Thu 18 at     |       |  PST     |       |   |   |
| 2200                              |       |          |       |   |   |
+-----------------------------------+-------+----------+-------+---+---+
 
 
 
 
+-------+----------+-------+---+---+
| Given | 20 | 75 mg |   |   |
|       | 18 20:23 |       |   |   |
|       |  PST     |       |   |   |
+-------+----------+-------+---+---+
| Given | 20 | 75 mg |   |   |
|       | 18 21:12 |       |   |   |
|       |  PST     |       |   |   |
+-------+----------+-------+---+---+
 
 
 
+---+---+
|   |   |
+---+---+
 
 
 
+-----------------------------------+-------+----------+------+---+---+
|   ondansetron (ZOFRAN) injection  | Given | 1/3/2019 | 4 mg |   |   |
| 4 mg  4 mg, Intravenous, Every 6  |       |  06:38   |      |   |   |
| Hours PRN, Nausea, Vomiting,      |       | PST      |      |   |   |
| Starting Thu 18 at 1538     |       |          |      |   |   |
+-----------------------------------+-------+----------+------+---+---+
 
 
 
+-------+----------+------+---+---+
| Given | 1/3/2019 | 4 mg |   |   |
|       |  22:57   |      |   |   |
|       | PST      |      |   |   |
+-------+----------+------+---+---+
 
 
 
+---+---+
|   |   |
+---+---+
 
 
 
+-----------------------------------+-------+----------+------+---+---+
|   ondansetron (ZOFRAN-ODT)        | Given | 2019 | 4 mg |   |   |
| disintegrating tablet 4 mg  4 mg, |       |  14:21   |      |   |   |
|  Oral, Every 6 Hours PRN, Nausea, |       | PST      |      |   |   |
|  Vomiting, Starting Thu 18  |       |          |      |   |   |
| at 1538                           |       |          |      |   |   |
+-----------------------------------+-------+----------+------+---+---+
 
 
 
+-------+----------+------+---+---+
| Given | 2019 | 4 mg |   |   |
|       |  06:23   |      |   |   |
|       | PST      |      |   |   |
+-------+----------+------+---+---+
| Given | 2019 | 4 mg |   |   |
|       |  08:39   |      |   |   |
|       | PST      |      |   |   |
+-------+----------+------+---+---+
 
 
 
 
+---+---+
|   |   |
+---+---+
 
 
 
+-----------------------------------+-------+----------+--------+---+---+
|   oxybutynin (DITROPAN) tablet    | Given | 1/3/2019 | 2.5 mg |   |   |
| 2.5 mg  2.5 mg, Oral, 2 Times     |       |  08:31   |        |   |   |
| Daily, First dose on Thu 18 |       | PST      |        |   |   |
|  at 2100                          |       |          |        |   |   |
+-----------------------------------+-------+----------+--------+---+---+
 
 
 
+-------+----------+--------+---+---+
| Given | 1/3/2019 | 2.5 mg |   |   |
|       |  20:35   |        |   |   |
|       | PST      |        |   |   |
+-------+----------+--------+---+---+
| Given | 2019 | 2.5 mg |   |   |
|       |  08:11   |        |   |   |
|       | PST      |        |   |   |
+-------+----------+--------+---+---+
 
 
 
+---+---+
|   |   |
+---+---+
 
 
 
+-----------------------------------+-------+----------+-------+---+---+
|   pantoprazole (PROTONIX) EC      | Given | 2019 | 40 mg |   |   |
| tablet 40 mg  40 mg, Oral, Every  |       |  05:48   |       |   |   |
| Morning Before Breakfast, First   |       | PST      |       |   |   |
| dose on 18 at 0630      |       |          |       |   |   |
+-----------------------------------+-------+----------+-------+---+---+
 
 
 
+-------+----------+-------+---+---+
| Given | 1/3/2019 | 40 mg |   |   |
|       |  06:05   |       |   |   |
|       | PST      |       |   |   |
+-------+----------+-------+---+---+
| Given | 2019 | 40 mg |   |   |
|       |  05:41   |       |   |   |
|       | PST      |       |   |   |
+-------+----------+-------+---+---+
 
 
 
+---+---+
|   |   |
+---+---+
 
 
 
 
+-----------------------------------+-------+----------+------+---+---+
|   polyethylene glycol (GLYCOLAX)  | Given | 1/3/2019 | 17 g |   |   |
| packet 17 g  17 g, Oral, Daily    |       |  08:37   |      |   |   |
| PRN, Constipation, Starting Thu   |       | PST      |      |   |   |
| 18 at 1538                  |       |          |      |   |   |
+-----------------------------------+-------+----------+------+---+---+
 
 
 
+---+---+
|   |   |
+---+---+
 
 
 
+-----------------------------------+-------+----------+------+---+---+
|   prochlorperazine tablet 5 mg  5 | Given | 2019 | 5 mg |   |   |
|  mg, Oral, 2 Times Daily PRN,     |       |  11:35   |      |   |   |
| Nausea, Starting Thu 18 at  |       | PST      |      |   |   |
| 1538                              |       |          |      |   |   |
+-----------------------------------+-------+----------+------+---+---+
 
 
 
+---+---+
|   |   |
+---+---+
 
 
 
+-----------------------------------+-------+----------+---------+---+---+
|   rOPINIRole (REQUIP) tablet 0.75 | Given | 2019 | 0.75 mg |   |   |
|  mg  0.75 mg, Oral, Nightly,      |       |  22:00   |         |   |   |
| First dose on Thu 18 at     |       | PST      |         |   |   |
| 2200                              |       |          |         |   |   |
+-----------------------------------+-------+----------+---------+---+---+
 
 
 
+-------+----------+---------+---+---+
| Given | 2019 | 0.75 mg |   |   |
|       |  21:44   |         |   |   |
|       | PST      |         |   |   |
+-------+----------+---------+---+---+
| Given | 1/3/2019 | 0.75 mg |   |   |
|       |  21:10   |         |   |   |
|       | PST      |         |   |   |
+-------+----------+---------+---+---+
 
 
 
+---+---+
|   |   |
+---+---+
 
 
 
 
+-----------------------------------+-------+----------+--------+---+---+
|   sodium chloride (PF) 0.9 %      | Given | 20 | 10 mLs |   |   |
| flush 10 mL  10 mL, Intravenous,  |       | 18 07:47 |        |   |   |
| Every 8 Hours PRN, Line Care,     |       |  PST     |        |   |   |
| when tolerating oral fluids,      |       |          |        |   |   |
| Starting u 18 at 1538     |       |          |        |   |   |
+-----------------------------------+-------+----------+--------+---+---+
 
 
 
+-----------------------------------+---+
|                                   |   |
+-----------------------------------+---+
|   sodium chloride 0.9 % bolus 100 |   |
|  mL  100 mL, Intravenous, Every 5 |   |
|  Min PRN, for cramps or           |   |
| hypotension during dialysis.,     |   |
| Starting Thu 1/3/19 at 1706,      |   |
| DIALYSIS                          |   |
+-----------------------------------+---+
|                                   |   |
+-----------------------------------+---+
 
 
 
+-----------------------------------+---------+----------+---+----------+---+
|   sodium chloride 0.9 % infusion  | New Bag | 20 |   | 30 mL/hr |   |
|  at 30 mL/hr, Intravenous,        |         | 18 10:53 |   |          |   |
| Continuous, Starting Thu 18 |         |  PST     |   |          |   |
|  at 1130, Pre-op                  |         |          |   |          |   |
+-----------------------------------+---------+----------+---+----------+---+
 
 
 
+---+---+
|   |   |
+---+---+
 
 
 
+----------------------------------+-------+----------+----------+---+---+
|   vancomycin (VANCOCIN) 1000     | Given | 20 | 1,000 mg |   |   |
| mg/250 mL IVPB  1,000 mg,        |       | 18 17:44 |          |   |   |
| Intravenous, Administer over 60  |       |  PST     |          |   |   |
| Minutes, Once, Sun 18 at   |       |          |          |   |   |
| 1800, For 1 dose                 |       |          |          |   |   |
+----------------------------------+-------+----------+----------+---+---+
 
 
 
+---+---+
|   |   |
+---+---+
 
 
 
+-----------------------------------+-------+----------+--------+-------+---+
|   vancomycin (VANCOCIN) 750 mg in | Given | 2019 | 750 mg | 250   |   |
|  sodium chloride (IV) 0.9 % 250   |       |  16:42   |        | mL/hr |   |
| mL IVPB  750 mg, Intravenous,     |       | PST      |        |       |   |
 
| Administer over 60 Minutes, Once, |       |          |        |       |   |
|  Tue 19 at 1230, For 1 dose,  |       |          |        |       |   |
| Admin during last hour of         |       |          |        |       |   |
| dialysis OR immediately after     |       |          |        |       |   |
| dialysis                          |       |          |        |       |   |
+-----------------------------------+-------+----------+--------+-------+---+
 
 
 
+---+---+
|   |   |
+---+---+
 
 
 
+-----------------------------------+-------+----------+--------+---+---+
|   vancomycin (VANCOCIN) 750       | Given | 1/3/2019 | 750 mg |   |   |
| mg/250 mL IVPB  750 mg,           |       |  21:12   |        |   |   |
| Intravenous, Administer over 60   |       | PST      |        |   |   |
| Minutes, See Admin Instructions,  |       |          |        |   |   |
| Starting Sun 18 at 1745,    |       |          |        |   |   |
| Administer dose during last hour  |       |          |        |   |   |
| or immediately following each     |       |          |        |   |   |
| Hemodialysis session. Use Rx      |       |          |        |   |   |
| button to message pharmacy for    |       |          |        |   |   |
| dose.                             |       |          |        |   |   |
+-----------------------------------+-------+----------+--------+---+---+
 
 
 
+---+---+
|   |   |
+---+---+
 
 
 
+----------------------------------+-------+----------+--------+---+---+
|   vancomycin (VANCOCIN) 750      | Given | 2019 | 750 mg |   |   |
| mg/250 mL IVPB  750 mg,          |       |  16:44   |        |   |   |
| Intravenous, Administer over 60  |       | PST      |        |   |   |
| Minutes, Once, Wed 19 at     |       |          |        |   |   |
| 1500, For 1 dose, Give after     |       |          |        |   |   |
| dialysis                         |       |          |        |   |   |
+----------------------------------+-------+----------+--------+---+---+
 
 
 
+---+---+
|   |   |
+---+---+
 in this encounter

## 2019-04-03 NOTE — XMS
Encounter Summary
  Created on: 2019
 
 Cherie Villalobos
 External Reference #: [id number]
 : 48
 Sex: Female
 
 Demographics
 
 
+-----------------------+--------------------------+
| Address               | [address]               |
|                       | [place]  [address] |
+-----------------------+--------------------------+
| Home Phone            | [phone]          |
+-----------------------+--------------------------+
| Preferred Language    | Unknown                  |
+-----------------------+--------------------------+
| Marital Status        |                   |
+-----------------------+--------------------------+
| Church Affiliation | [id number]                     |
+-----------------------+--------------------------+
| Race                  | Unknown                  |
+-----------------------+--------------------------+
| Ethnic Group          | Unknown                  |
+-----------------------+--------------------------+
 
 
 Author
 
 
+--------------+-----------------------+
| Author       | Sherry Avalara |
+--------------+-----------------------+
| Organization | [facility] |
+--------------+-----------------------+
| Address      | Unknown               |
+--------------+-----------------------+
| Phone        | Unavailable           |
+--------------+-----------------------+
 
 
 
 Support
 
 
+---------------+--------------+---------------------+-----------------+
| Name          | Relationship | Address             | Phone           |
+---------------+--------------+---------------------+-----------------+
| Anoop Villalobos | ECON         | [place], | [phone] |
|               |              |  [place]  [address]     |                 |
+---------------+--------------+---------------------+-----------------+
| Jennifer Dickson | ECON         | [place]       | [phone] |
|               |              | [id number]               |                 |
+---------------+--------------+---------------------+-----------------+
 
 
 
 
 Care Team Providers
 
 
+-----------------------+------+-----------------+
| Care Team Member Name | Role | Phone           |
+-----------------------+------+-----------------+
| Ivy Couch DO      | PCP  | [phone] |
+-----------------------+------+-----------------+
 
 
 
 Encounter Details
 
 
+--------+------------+----------------------+-----------+-------------+
| Date   | Type       | Department           | Care Team | Description |
+--------+------------+----------------------+-----------+-------------+
| / | Procedure  |   [facility]    |           |             |
| 2019   | Pass       | [facility]   |           |             |
|        |            | [address] |           |             |
|        |            |   [address]     |           |             |
|        |            | [place] [address]   |           |             |
|        |            | [phone]         |           |             |
+--------+------------+----------------------+-----------+-------------+
 
 
 
 Social History
 
 
+---------------+------------+-----------+--------+------------------+
| Tobacco Use   | Types      | Packs/Day | Years  | Date             |
|               |            |           | Used   |                  |
+---------------+------------+-----------+--------+------------------+
| Former Smoker | Cigarettes | 1         | 30     | Quit: 2004 |
+---------------+------------+-----------+--------+------------------+
 
 
 
+---------------------+---+---+---+
| Smokeless Tobacco:  |   |   |   |
| Never Used          |   |   |   |
+---------------------+---+---+---+
 
 
 
+------------------------------+
| Comments: quite smoking  |
+------------------------------+
 
 
 
+-------------+-----------+---------+----------+
| Alcohol Use | Drinks/We | oz/Week | Comments |
|             | ek        |         |          |
+-------------+-----------+---------+----------+
| No          |           |         |          |
+-------------+-----------+---------+----------+
 
 
 
 
+------------------+---------------+
| Sex Assigned at  | Date Recorded |
| Birth            |               |
+------------------+---------------+
| Not on file      |               |
+------------------+---------------+
 as of this encounter
 
 Plan of Treatment
 
 
+--------+---------+-----------+----------------------+-------------+
| Date   | Type    | Specialty | Care Team            | Description |
+--------+---------+-----------+----------------------+-------------+
| 04/10/ | Office  | Podiatry  |   Srinivasan Jordan,  |             |
| 2019   | Visit   |           | DPM  [address], |             |
|        |         |           |  [place],  |             |
|        |         |           | [place] [address]             |             |
|        |         |           | [phone]         |             |
|        |         |           | [phone] (Fax)   |             |
+--------+---------+-----------+----------------------+-------------+
 as of this encounter
 
 Visit Diagnoses
 Not on filein this encounter

## 2019-04-03 NOTE — XMS
PreManage Notification: DANILO JO MRN:[id number]
 
Security Information
 
Security Events
No recent Security Events currently on file
 
 
 
CRITERIA MET
------------
- Group Notification
- 6 ED Visits in 6 Months
- [facility] - Has Care Guidelines
- [facility] - 3 Facilities in 90 Days
- PDMP
- [facility] - 2 Visits in 30 Days
 
 
CARE PROVIDERS
-------------------------------------------------------------------------------------
SANTIAGO JOHNSON     Nurse Practitioner: Family     Current
 
PHONE: Unknown
-------------------------------------------------------------------------------------
Marco Antonio Martinez     /Care Coordinator     01/04/2019-Current
 
PHONE: [phone]
-------------------------------------------------------------------------------------
Marco Antonio Martinez     Primary Care     01/04/2019-Current
 
PHONE: [phone]
-------------------------------------------------------------------------------------
[facility]           Primary Care     Current
[employer]
 
PHONE: Unknown
-------------------------------------------------------------------------------------
RUTHY TODD              Primary Care     [facility]
 
PHONE: Unknown
-------------------------------------------------------------------------------------
MANOLO GONZALEZ     Primary Care     Current
 
PHONE: Unknown
-------------------------------------------------------------------------------------
[employer]     Mental Health Provider     Current
 
PHONE: [phone]
-------------------------------------------------------------------------------------
orsagrario     Case or Care Manager     Current
 
PHONE: Unknown
-------------------------------------------------------------------------------------
Willard Doyle         Case or Care Manager     Current
[employer] [facility]
 
 
PHONE: [phone]
-------------------------------------------------------------------------------------
Jairo Gutierrez MD     Other     Current
 
PHONE: Unknown
-------------------------------------------------------------------------------------
 
Guidelines Source: [employer]
Guidelines Date: 03/19/2019
 
Care Coordination:
    Patient requires education on appropriate ED usage.\T\nbsp; Emphasize the 
    importance of using outpatient medical services for the treatment of chronic
    conditions. Please contact Community Health WorkerWillard at [phone] if
    patient is seen in ED.
-------------------------------------------------------------------------
Additional care guidelines exist for the following facilities:    
[facility] ( 09/12/2018 )
 
TISHA VISIT COUNT (12 MO.)
-------------------------------------------------------------------------------------
[employer]
 
[address] [place], [place] [address]Ad[place]
 
[address] [place] [address] ALEJA Braun
-------------------------------------------------------------------------------------
TOTAL 25
-------------------------------------------------------------------------------------
NOTE: Visits indicate total known visits.
 
ED/UCC VISIT TRACKING (12 MO.)
-------------------------------------------------------------------------------------
04/03/2019 19:27
[facility]
 
TYPE: Emergency
 
COMPLAINT:
- CHEST PAIN
-------------------------------------------------------------------------------------
03/26/2019 11:56
[facility]
 
TYPE: Emergency
 
COMPLAINT:
- CHEST PAIN
-------------------------------------------------------------------------------------
03/17/2019 13:25
[facility] [place]
 
TYPE: Emergency
 
DIAGNOSES:
- Contusion of other part of head, initial encounter
- FALL
- Fall on same level, unspecified, initial encounter
 
- Hyperkalemia
-------------------------------------------------------------------------------------
03/11/2019 15:03
[facility]
 
TYPE: Emergency
 
DIAGNOSES:
- Weakness
- Acute cystitis without hematuria
- weakness
-------------------------------------------------------------------------------------
02/14/2019 15:19
[facility]
 
TYPE: Emergency
 
DIAGNOSES:
- Unspecified fall, initial encounter
- Displaced fracture of distal phalanx of right great toe, initial encounter for
closed fracture
- Cellulitis of right toe
- Other fatigue
- Skin Complaint
- Weakness
- Referral
- Unspecified place in unspecified non-institutional (private) residence as the place
of occurrence of the external cause
-------------------------------------------------------------------------------------
02/13/2019 15:56
[facility]
 
TYPE: Emergency
 
DIAGNOSES:
- Multiple Falls
- Referral
- Circulatory Problem
-------------------------------------------------------------------------------------
02/09/2019 22:15
[facility]     [place]
 
TYPE: Emergency
 
DIAGNOSES:
- Other chest pain
- ABD PAIN
-------------------------------------------------------------------------------------
01/23/2019 13:01
[facility]
 
TYPE: Emergency
 
DIAGNOSES:
- Foot Pain
-------------------------------------------------------------------------------------
01/16/2019 09:44
[facility]
 
TYPE: Emergency
 
 
DIAGNOSES:
- Chest Pain
- Elevated blood-pressure reading, without diagnosis of hypertension
- Presence of aortocoronary bypass graft
- Nausea
- Chest pain, unspecified
- Other specified postprocedural states
- Shortness of Breath
-------------------------------------------------------------------------------------
01/16/2019 05:43
[facility]     [place] OR
 
TYPE: Emergency
 
DIAGNOSES:
- CHEST PAIN
- Abnormal levels of other serum enzymes
- Personal history of other diseases of the circulatory system
- Chest pain, unspecified
-------------------------------------------------------------------------------------
01/08/2019 14:45
[facility] [place]
 
[facility]: [facility]
 
DIAGNOSES:
- Hemorrhage due to vascular prosthetic devices, implants and grafts, initial
encounter
- FISTULA BLEEDING
-------------------------------------------------------------------------------------
12/22/2018 17:20
[facility] [place]
 
[facility]
 
DIAGNOSES:
- Type 2 diabetes mellitus with hyperglycemia
- Chest pain, unspecified
- CHEST PAIN
-------------------------------------------------------------------------------------
11/27/2018 18:05
[facility]
 
TYPE: Emergency
 
COMPLAINT:
- SOB
-------------------------------------------------------------------------------------
11/27/2018 07:45
[facility] [place]
 
[facility]
 
DIAGNOSES:
- Unspecified fall, initial encounter
- FALL
- Dependence on renal dialysis
- End stage renal disease
-------------------------------------------------------------------------------------
 
11/19/2018 14:59
[facility] [place]
 
TYPE: Emergency
 
DIAGNOSES:
- Essential (primary) hypertension
- Type 2 diabetes mellitus with hyperglycemia
- Type 2 diabetes mellitus with unspecified complications
- FALL
- Unspecified fall, initial encounter
- Headache
-------------------------------------------------------------------------------------
11/17/2018 14:57
[facility]
 
TYPE: Emergency
 
COMPLAINT:
- BACK PAIN
- DORSALGIA UNSPECIFIED
- FREQUENCY OF MICTURITION
- PAINFUL MICTURITION UNSPECIFIED
 
DIAGNOSES:
0. Frequency of micturition
1. Urinary tract infection, site not specified
5. Dorsalgia, unspecified
6. Type 2 diabetes mellitus with hyperglycemia
7. Type 2 diabetes mellitus with diabetic chronic kidney disease
8. End stage renal disease
9. Dependence on renal dialysis
10. Shortness of breath
11. Long term (current) use of anticoagulants
12. Long term (current) use of aspirin
13. Other long term (current) drug therapy
14. Personal history of nicotine dependence
15. Presence of cardiac pacemaker
16. Presence of aortocoronary bypass graft
-------------------------------------------------------------------------------------
10/04/2018 14:42
[facility] [place]
 
TYPE: Emergency
 
COMPLAINT:
- CHEST PAIN
-------------------------------------------------------------------------------------
09/14/2018 19:39
[facility] [place]
 
[facility]
 
COMPLAINT:
- CHEST PAIN, SHORT OF BREATH
-------------------------------------------------------------------------------------
08/30/2018 15:20
[facility] [place]
 
[facility]
 
 
COMPLAINT:
- INFECTION ON LEFT FOOT
-------------------------------------------------------------------------------------
08/21/2018 08:13
[facility]
 
TYPE: Emergency
 
DIAGNOSES:
- Hematuria
- Hyperkalemia
- Dependence on renal dialysis
- Hematuria, unspecified
- Urinary tract infection, site not specified
- End stage renal disease
-------------------------------------------------------------------------------------
Plus 5 More Visits
-------------------------------------------------------------------------------------
 
 
INPATIENT VISIT TRACKING (12 MO.)
-------------------------------------------------------------------------------------
03/26/2019 11:56
[facility] [place]
 
TYPE: Medical Surgical
 
COMPLAINT:
- ACUTE CP, CHRONIC RENAL FAILURE, ANEMIA
 
DIAGNOSES:
0. Pleural effusion, not elsewhere classified
1. 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
chronic kidney disease, or end stage renal disease
2. End stage renal disease
3. Acute on chronic diastolic (congestive) heart failure
4. Acute kidney failure, unspecified
5. Chronic kidney disease, unspecified
6. Anemia, unspecified
7. Type 2 diabetes mellitus with diabetic chronic kidney disease
8. Atherosclerotic heart disease of native coronary artery without angina pectoris
9. Type 2 diabetes mellitus with diabetic neuropathy, unspecified
10. Unspecified atrial fibrillation
11. Restless legs syndrome
12. Unspecified urinary incontinence
13. Patient's noncompliance with dietary regimen
14. Presence of cardiac pacemaker
15. Presence of aortocoronary bypass graft
16. Dependence on renal dialysis
17. Patient's noncompliance with renal dialysis
18. History of falling
19. Long term (current) use of insulin
-------------------------------------------------------------------------------------
01[facility]23[facility]2019 13[facility]01
[facility]
 
TYPE: Vascular Surgery
 
DIAGNOSES:
 
- End stage renal disease
- Anemia in chronic kidney disease
- Dependence on renal dialysis
- Other disorders of plasma-protein metabolism, not elsewhere classified
- Other specified personal risk factors, not elsewhere classified
- Peripheral vascular disease, unspecified
- Foot Pain
-------------------------------------------------------------------------------------
12/27/2018 09:45
[facility]
 
TYPE: Recovery
 
DIAGNOSES:
- End stage renal disease
- Peripheral arterial disease, osteomyelitis left foot
- Other acute osteomyelitis, left ankle and foot
- Long term (current) use of antibiotics
- Anemia in chronic kidney disease
-------------------------------------------------------------------------------------
12[facility]05[facility]2018 07:52
[facility]
 
TYPE: General Medicine
 
DIAGNOSES:
- End stage renal disease
- Peripheral vascular disease, unspecified
- Dependence on renal dialysis
- Long term (current) use of insulin
- Other chronic osteomyelitis, left ankle and foot
- Type 2 diabetes mellitus with diabetic chronic kidney disease
- Essential (primary) hypertension
-------------------------------------------------------------------------------------
11/29/2018 11:38
[facility]
 
TYPE: Inpatient
 
DIAGNOSES:
- Upper GIB
- Other specified personal risk factors, not elsewhere classified
- Chronic systolic (congestive) heart failure
- Other disorders of phosphorus metabolism
- Anemia in chronic kidney disease
- Other disorders of plasma-protein metabolism, not elsewhere classified
- Moderate protein-calorie malnutrition
- End stage renal disease
- Other chronic osteomyelitis, unspecified ankle and foot
-------------------------------------------------------------------------------------
11/27/2018 18:05
[facility]
 
TYPE: Medical Surgical
 
COMPLAINT:
- SOB
 
DIAGNOSES:
0. Type 2 diabetes mellitus with foot ulcer
 
1. Type 2 diabetes mellitus with other specified complication
2. Urinary tract infection, site not specified
3. Unspecified protein-calorie malnutrition
4. Other chronic osteomyelitis, unspecified site
5. 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
chronic kidney disease, or end stage renal disease
6. Chronic diastolic (congestive) heart failure
7. Other osteomyelitis, ankle and foot
8. Type 2 diabetes mellitus with foot ulcer
9. Atherosclerotic heart disease of native coronary artery without angina pectoris
10. Chronic obstructive pulmonary disease, unspecified
11. Other malaise
12. Type 2 diabetes mellitus with hyperglycemia
13. Type 2 diabetes mellitus with diabetic chronic kidney disease
14. Type 2 diabetes mellitus with diabetic peripheral angiopathy without gangrene
15. Unspecified atrial fibrillation
16. Type 2 diabetes mellitus with diabetic neuropathy, unspecified
17. End stage renal disease
18. Dependence on renal dialysis
19. Long term (current) use of insulin
20. Allergy status to penicillin
21. Allergy status to other drugs, medicaments and biological substances status
22. Personal history of nicotine dependence
23. Anemia in chronic kidney disease
24. Other disorders of phosphorus metabolism
25. Other disorders of plasma-protein metabolism, not elsewhere classified
26. Gout, unspecified
27. Gastro-esophageal reflux disease without esophagitis
28. Old myocardial infarction
29. Presence of aortocoronary bypass graft
-------------------------------------------------------------------------------------
11/05/2018 14:39
[facility]
 
TYPE: Medical Surgical
 
COMPLAINT:
- RML PNEUMONIA SYNCOPE COLLAPSE
 
DIAGNOSES:
0. Syncope and collapse
1. Benign paroxysmal vertigo, unspecified ear
2. End stage renal disease
3. Hypertensive chronic kidney disease with stage 5 chronic kidney disease or end
stage renal disease
4. Urinary tract infection, site not specified
5. 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
chronic kidney disease, or end stage renal disease
6. Kidney transplant status
7. Unspecified systolic (congestive) heart failure
8. Syncope and collapse
9. Type 2 diabetes mellitus with diabetic chronic kidney disease
10. Atherosclerotic heart disease of native coronary artery without angina pectoris
11. Nicotine dependence, cigarettes, uncomplicated
12. Anemia, unspecified
13. Other disorders of plasma-protein metabolism, not elsewhere classified
14. Other disorders of phosphorus metabolism
15. Type 2 diabetes mellitus with diabetic polyneuropathy
16. Other specified bacterial agents as the cause of diseases classified elsewhere
17. Paroxysmal atrial fibrillation
 
18. Presence of aortocoronary bypass graft
19. Long term (current) use of insulin
-------------------------------------------------------------------------------------
09/15/2018 00:38
[facility]     [facility]
 
TYPE: Medical Surgical
 
DIAGNOSES:
- Long term (current) use of insulin
- Dependence on renal dialysis
- End stage renal disease
- Type 2 diabetes mellitus with diabetic chronic kidney disease
- Essential (primary) hypertension
- Acute ischemic heart disease, unspecified
- Anemia in chronic kidney disease
-------------------------------------------------------------------------------------
04/27/2018 18:23
[facility]
 
TYPE: Medical Surgical
 
COMPLAINT:
- SOB, UTI, RENAL FAILURE
 
DIAGNOSES:
0. Cough
1. Pneumonia due to Pseudomonas
2. End stage renal disease
3. 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
chronic kidney disease, or end stage renal disease
4. Cachexia
5. Chronic obstructive pulmonary disease with acute lower respiratory infection
6. Type 2 diabetes mellitus with diabetic chronic kidney disease
7. Heart failure, unspecified
8. Hyperlipidemia, unspecified
9. Gastro-esophageal reflux disease without esophagitis
10. Atherosclerotic heart disease of native coronary artery without angina pectoris
11. Major depressive disorder, single episode, unspecified
12. Anxiety disorder, unspecified
13. Type 2 diabetes mellitus with diabetic polyneuropathy
14. Unspecified atrial fibrillation
15. Pneumonia due to Methicillin resistant Staphylococcus aureus
16. Nosocomial condition
17. BACTERIURIA
17. Abnormal coagulation profile
18. Abnormal coagulation profile
18. Adverse effect of anticoagulants, initial encounter
19. Anemia in chronic kidney disease
19. Adverse effect of anticoagulants, initial encounter
20. Anemia in chronic kidney disease
20. Other disorders of plasma-protein metabolism, not elsewhere classified
21. Exposure to other specified factors, initial encounter
21. Other disorders of plasma-protein metabolism, not elsewhere classified
22. Exposure to other specified factors, initial encounter
22. Activity, unspecified
23. Activity, unspecified
23. Other abnormal findings in urine
24. Presence of aortocoronary bypass graft
25. Presence of prosthetic heart valve
 
26. Long term (current) use of insulin
27. Dependence on renal dialysis
28. Unspecified external cause status
29. Old myocardial infarction
30. Acquired absence of both cervix and uterus
31. Acquired absence of other specified parts of digestive tract
32. Allergy status to narcotic agent status
33. Allergy status to penicillin
34. Allergy status to other drugs, medicaments and biological substances status
35. Long term (current) use of anticoagulants
36. Long term (current) use of aspirin
37. Body mass index (BMI) 23.0-23.9, adult
-------------------------------------------------------------------------------------
04/07/2018 15:07
[facility]
 
TYPE: Medical Surgical
 
COMPLAINT:
- ACUTE MI
 
DIAGNOSES:
0. Other chest pain
1. Gastro-esophageal reflux disease without esophagitis
2. End stage renal disease
3. 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
chronic kidney disease, or end stage renal disease
4. Chronic systolic (congestive) heart failure
5. Atherosclerotic heart disease of native coronary artery with other forms of angina
pectoris
6. Type 2 diabetes mellitus with diabetic chronic kidney disease
7. Hyperlipidemia, unspecified
8. Major depressive disorder, single episode, unspecified
9. Chronic obstructive pulmonary disease, unspecified
10. Anemia in chronic kidney disease
11. Other disorders of plasma-protein metabolism, not elsewhere classified
12. Unspecified atrial fibrillation
13. Overactive bladder
14. Restless legs syndrome
15. Adverse effect of cardiac-stimulant glycosides and drugs of similar action,
initial encounter
16. Unspecified place in hospital as the place of occurrence of the external cause
17. Abnormal coagulation profile
18. Exposure to other specified factors, initial encounter
19. Other visual disturbances
20. Acquired absence of other specified parts of digestive tract
21. Acquired absence of both cervix and uterus
22. Personal history of nicotine dependence
23. Long term (current) use of anticoagulants
24. Allergy status to narcotic agent status
25. Allergy status to penicillin
26. Allergy status to other drugs, medicaments and biological substances status
27. Dependence on renal dialysis
28. Presence of coronary angioplasty implant and graft
29. Activity, unspecified
30. Presence of aortocoronary bypass graft
31. Presence of prosthetic heart valve
-------------------------------------------------------------------------------------
 
[email or web address]

## 2019-04-03 NOTE — XMS
Encounter Summary
  Created on: 2019
 
 Cherie Villalobos
 External Reference #: [id number]
 : 48
 Sex: Female
 
 Demographics
 
 
+-----------------------+--------------------------+
| Address               | [address]               |
|                       | [place]  [address] |
+-----------------------+--------------------------+
| Home Phone            | [phone]          |
+-----------------------+--------------------------+
| Preferred Language    | Unknown                  |
+-----------------------+--------------------------+
| Marital Status        |                   |
+-----------------------+--------------------------+
| Mosque Affiliation | 1013                     |
+-----------------------+--------------------------+
| Race                  | Unknown                  |
+-----------------------+--------------------------+
| Ethnic Group          | Unknown                  |
+-----------------------+--------------------------+
 
 
 Author
 
 
+--------------+-----------------------+
| Author       | Sherry Keynoir |
+--------------+-----------------------+
| Organization | [facility] |
+--------------+-----------------------+
| Address      | Unknown               |
+--------------+-----------------------+
| Phone        | Unavailable           |
+--------------+-----------------------+
 
 
 
 Support
 
 
+---------------+--------------+---------------------+-----------------+
| Name          | Relationship | Address             | Phone           |
+---------------+--------------+---------------------+-----------------+
| Anoop Villalobos | ECON         | [place], | [phone] |
|               |              |  [place]  [address]     |                 |
+---------------+--------------+---------------------+-----------------+
| Jennifer Dickson | ECON         | [place]       | [phone] |
|               |              | [id number]               |                 |
+---------------+--------------+---------------------+-----------------+
 
 
 
 
 Care Team Providers
 
 
+-----------------------+------+-----------------+
| Care Team Member Name | Role | Phone           |
+-----------------------+------+-----------------+
| Ivy Couch DO      | PCP  | [phone] |
+-----------------------+------+-----------------+
 
 
 
 Encounter Details
 
 
+--------+------------+----------------------+-----------+-------------+
| Date   | Type       | Department           | Care Team | Description |
+--------+------------+----------------------+-----------+-------------+
| / | Procedure  |   [facility]    |           |             |
| 2019   | Pass       | [facility]   |           |             |
|        |            | [address] |           |             |
|        |            |   [address] [facility]     |           |             |
|        |            | [place] [address]   |           |             |
|        |            | [phone]         |           |             |
+--------+------------+----------------------+-----------+-------------+
 
 
 
 Social History
 
 
+---------------+------------+-----------+--------+------------------+
| Tobacco Use   | Types      | Packs/Day | Years  | Date             |
|               |            |           | Used   |                  |
+---------------+------------+-----------+--------+------------------+
| Former Smoker | Cigarettes | 1         | 30     | Quit: 2004 |
+---------------+------------+-----------+--------+------------------+
 
 
 
+---------------------+---+---+---+
| Smokeless Tobacco:  |   |   |   |
| Never Used          |   |   |   |
+---------------------+---+---+---+
 
 
 
+------------------------------+
| Comments: quite smoking  |
+------------------------------+
 
 
 
+-------------+-----------+---------+----------+
| Alcohol Use | Drinks/We | oz/Week | Comments |
|             | ek        |         |          |
+-------------+-----------+---------+----------+
| No          |           |         |          |
+-------------+-----------+---------+----------+
 
 
 
 
+------------------+---------------+
| Sex Assigned at  | Date Recorded |
| Birth            |               |
+------------------+---------------+
| Not on file      |               |
+------------------+---------------+
 as of this encounter
 
 Plan of Treatment
 
 
+--------+---------+-----------+----------------------+-------------+
| Date   | Type    | Specialty | Care Team            | Description |
+--------+---------+-----------+----------------------+-------------+
| 04/10/ | Office  | Podiatry  |   Srinivasan Jordan,  |             |
| 2019   | Visit   |           | DPM  [address], |             |
|        |         |           |  [place],  |             |
|        |         |           | [place] [address]             |             |
|        |         |           | [phone]         |             |
|        |         |           | [phone] (Fax)   |             |
+--------+---------+-----------+----------------------+-------------+
 as of this encounter
 
 Visit Diagnoses
 Not on filein this encounter

## 2019-04-03 NOTE — XMS
Encounter Summary
  Created on: 2019
 
 Cherie Villalobos
 External Reference #: [id number]
 : 48
 Sex: Female
 
 Demographics
 
 
+-----------------------+--------------------------+
| Address               | [address]               |
|                       | [place]  [address] |
+-----------------------+--------------------------+
| Home Phone            | [phone]          |
+-----------------------+--------------------------+
| Preferred Language    | Unknown                  |
+-----------------------+--------------------------+
| Marital Status        |                   |
+-----------------------+--------------------------+
| Tenriism Affiliation | 1013                     |
+-----------------------+--------------------------+
| Race                  | Unknown                  |
+-----------------------+--------------------------+
| Ethnic Group          | Unknown                  |
+-----------------------+--------------------------+
 
 
 Author
 
 
+--------------+--------------------------------------------+
| Author       | [facility]  |
|              | [facility] [place]                                |
+--------------+--------------------------------------------+
| Organization | [facility]  |
|              | [facility] [place]                                |
+--------------+--------------------------------------------+
| Address      | Unknown                                    |
+--------------+--------------------------------------------+
| Phone        | Unavailable                                |
+--------------+--------------------------------------------+
 
 
 
 Support
 
 
+---------------+--------------+---------------------+-----------------+
| Name          | Relationship | Address             | Phone           |
+---------------+--------------+---------------------+-----------------+
| Anoop Villalobos | ECON         | [address], | [phone] |
|               |              |  [place]  [address]     |                 |
+---------------+--------------+---------------------+-----------------+
| Jennifer Dickson | ECON         | [place], [place]       | [phone] |
|               |              | [address]               |                 |
+---------------+--------------+---------------------+-----------------+
 
 
 
 
 Care Team Providers
 
 
+--------------------------+------+-----------------+
| Care Team Member Name    | Role | Phone           |
+--------------------------+------+-----------------+
| Araseli Montelongo Activity  | PCP  | [phone] |
| Professional             |      |                 |
+--------------------------+------+-----------------+
 
 
 
 Reason for Visit
 
 
+-----------+---------------------------------+
| Reason    | Comments                        |
+-----------+---------------------------------+
| Lab Order | Pt due for labs prior to appt.  |
+-----------+---------------------------------+
 
 
 
 Encounter Details
 
 
+--------+-----------+----------------------+----------------------+--------------------+
| Date   | Type      | Department           | Care Team            | Description        |
+--------+-----------+----------------------+----------------------+--------------------+
| / | Telephone |   [facility]          |   Johnie John, | Lab Order (Pt due  |
|    |           | CARDIOLOGY  [address]    |  MD  [address] [place] | for labs prior to  |
|        |           | [place], |  [address],   | appt. )            |
|        |           |  [place] [address]       | [place] [address]             |                    |
|        |           | [phone]         | [phone]         |                    |
|        |           |                      | [phone] (Fax)   |                    |
+--------+-----------+----------------------+----------------------+--------------------+
 
 
 
 Social History
 
 
+---------------+------------+-----------+--------+------------------+
| Tobacco Use   | Types      | Packs/Day | Years  | Date             |
|               |            |           | Used   |                  |
+---------------+------------+-----------+--------+------------------+
| Former Smoker | Cigarettes | 1         | 30     | Quit: 2004 |
+---------------+------------+-----------+--------+------------------+
 
 
 
+---------------------+---+---+---+
| Smokeless Tobacco:  |   |   |   |
| Never Used          |   |   |   |
+---------------------+---+---+---+
 
 
 
 
+-------------+-----------+---------+----------+
| Alcohol Use | Drinks/We | oz/Week | Comments |
|             | ek        |         |          |
+-------------+-----------+---------+----------+
| No          |           |         |          |
+-------------+-----------+---------+----------+
 
 
 
+------------------+---------------+
| Sex Assigned at  | Date Recorded |
| Birth            |               |
+------------------+---------------+
| Not on file      |               |
+------------------+---------------+
 as of this encounter
 
 Functional Status
 
 
+---------------------------------------------+----------+--------------------+
| Functional Status                           | Response | Date of Assessment |
+---------------------------------------------+----------+--------------------+
| Are you deaf or do you have serious         | No       | 2018         |
| difficulty hearing?                         |          |                    |
+---------------------------------------------+----------+--------------------+
| Are you blind or do you have serious        | No       | 2018         |
| difficulty seeing, even when wearing        |          |                    |
| glasses?                                    |          |                    |
+---------------------------------------------+----------+--------------------+
| Do you have serious difficulty walking or   | No       | 2018         |
| climbing stairs? (5 years old or older)     |          |                    |
+---------------------------------------------+----------+--------------------+
| Do you have difficulty dressing or bathing? | No       | 2018         |
|  (5 years old or older)                     |          |                    |
+---------------------------------------------+----------+--------------------+
| Because of a physical, mental, or emotional | No       | 2018         |
|  condition, do you have difficulty doing    |          |                    |
| errands alone such as visiting a doctor's   |          |                    |
| office or shopping? [15 years old or        |          |                    |
| older)]                                     |          |                    |
+---------------------------------------------+----------+--------------------+
 
 
 
+---------------------------------------------+----------+--------------------+
| Cognitive Status                            | Response | Date of Assessment |
+---------------------------------------------+----------+--------------------+
| Because of a physical, mental, or emotional | No       | 2018         |
|  condition, do you have serious difficulty  |          |                    |
| concentrating, remembering, or making       |          |                    |
| decisions? (5 years old or older)           |          |                    |
+---------------------------------------------+----------+--------------------+
 as of this encounter
 
 Plan of Treatment
 
 
+--------+---------+------------+----------------------+-------------+
 
| Date   | Type    | Specialty  | Care Team            | Description |
+--------+---------+------------+----------------------+-------------+
| / | Office  | Cardiology |   Johnie John, |             |
|    | Visit   |            |  MD  [address] |             |
|        |         |            |  [place],   |             |
|        |         |            | [place] [address]             |             |
|        |         |            | [phone]         |             |
|        |         |            | [phone] (Fax)   |             |
+--------+---------+------------+----------------------+-------------+
 
 
 
+-------------+--------+----------------------+----------------------+
| Name        | Priori | Associated Diagnoses | Order Schedule       |
|             | ty     |                      |                      |
+-------------+--------+----------------------+----------------------+
| Lipid Panel | Routin |   Mixed              | 1 Occurrences        |
|             | e      | hyperlipidemia       | starting 2019  |
|             |        |                      | until 2020     |
+-------------+--------+----------------------+----------------------+
 as of this encounter
 
 Visit Diagnoses
 
 
+----------------------------------+
| Diagnosis                        |
+----------------------------------+
|   Mixed hyperlipidemia - Primary |
+----------------------------------+

## 2019-04-03 NOTE — XMS
Encounter Summary
  Created on: 2019
 
 Cherie Villalobos
 External Reference #: [id number]
 : 48
 Sex: Female
 
 Demographics
 
 
+-----------------------+--------------------------+
| Address               | [address]               |
|                       | [place]  [address] |
+-----------------------+--------------------------+
| Home Phone            | [phone]          |
+-----------------------+--------------------------+
| Preferred Language    | Unknown                  |
+-----------------------+--------------------------+
| Marital Status        |                   |
+-----------------------+--------------------------+
| Sikhism Affiliation | [id number]                     |
+-----------------------+--------------------------+
| Race                  | Unknown                  |
+-----------------------+--------------------------+
| Ethnic Group          | Unknown                  |
+-----------------------+--------------------------+
 
 
 Author
 
 
+--------------+-----------------------+
| Author       | Sherry Kimengi |
+--------------+-----------------------+
| Organization | [facility] [employer] [facility] |
+--------------+-----------------------+
| Address      | Unknown               |
+--------------+-----------------------+
| Phone        | Unavailable           |
+--------------+-----------------------+
 
 
 
 Support
 
 
+---------------+--------------+---------------------+-----------------+
| Name          | Relationship | Address             | Phone           |
+---------------+--------------+---------------------+-----------------+
| Anoop Villalobos | ECON         | [place], | [phone] |
|               |              |  [place]  [address]     |                 |
+---------------+--------------+---------------------+-----------------+
| Jennifer Dickson | ECON         | [place]       | [phone] |
|               |              | [phone]               |                 |
+---------------+--------------+---------------------+-----------------+
 
 
 
 
 Care Team Providers
 
 
+-----------------------+------+-----------------+
| Care Team Member Name | Role | Phone           |
+-----------------------+------+-----------------+
| Ivy Couch DO      | PCP  | [phone] |
+-----------------------+------+-----------------+
 
 
 
 Reason for Visit
 
 
+---------------------+----------+
| Reason              | Comments |
+---------------------+----------+
| Multiple Falls      |          |
+---------------------+----------+
| Circulatory Problem |          |
+---------------------+----------+
| Referral            |          |
+---------------------+----------+
 
 
 
 Encounter Details
 
 
+--------+-----------+----------------------+----------------------+-------------+
| Date   | Type      | Department           | Care Team            | Description |
+--------+-----------+----------------------+----------------------+-------------+
| / | Emergency |   [facility]    |   Chau Deleon,  |             |
|    |           | [facility]       | MD [address]   |             |
|        |           | Emergency Department | EMERGENCY DEPARTMENT |             |
|        |           |   [address]     |   [place] [address] |             |
|        |           | [place] [address]   |   [phone]       |             |
|        |           | [phone]         | [phone] (Fax)   |             |
+--------+-----------+----------------------+----------------------+-------------+
 
 
 
 Social History
 
 
+---------------+------------+-----------+--------+------------------+
| Tobacco Use   | Types      | Packs/Day | Years  | Date             |
|               |            |           | Used   |                  |
+---------------+------------+-----------+--------+------------------+
| Former Smoker | Cigarettes | 1         | 30     | Quit: 2004 |
+---------------+------------+-----------+--------+------------------+
 
 
 
+---------------------+---+---+---+
| Smokeless Tobacco:  |   |   |   |
| Never Used          |   |   |   |
+---------------------+---+---+---+
 
 
 
 
+------------------------------+
| Comments: quite smoking  |
+------------------------------+
 
 
 
+-------------+-----------+---------+----------+
| Alcohol Use | Drinks/We | oz/Week | Comments |
|             | ek        |         |          |
+-------------+-----------+---------+----------+
| No          |           |         |          |
+-------------+-----------+---------+----------+
 
 
 
+------------------+---------------+
| Sex Assigned at  | Date Recorded |
| Birth            |               |
+------------------+---------------+
| Not on file      |               |
+------------------+---------------+
 as of this encounter
 
 Last Filed Vital Signs
 
 
+-------------------+----------------------+-------------------------+
| Vital Sign        | Reading              | Time Taken              |
+-------------------+----------------------+-------------------------+
| Blood Pressure    | 137/63               | 2019  4:05 PM PST |
+-------------------+----------------------+-------------------------+
| Pulse             | 87                   | 2019  4:05 PM PST |
+-------------------+----------------------+-------------------------+
| Temperature       | 36.3   C (97.3   F)  | 2019  4:05 PM PST |
+-------------------+----------------------+-------------------------+
| Respiratory Rate  | 16                   | 2019  4:05 PM PST |
+-------------------+----------------------+-------------------------+
| Oxygen Saturation | -                    | -                       |
+-------------------+----------------------+-------------------------+
| Inhaled Oxygen    | -                    | -                       |
| Concentration     |                      |                         |
+-------------------+----------------------+-------------------------+
| Weight            | 65.6 kg (144 lb 11.7 | 2019  4:05 PM PST |
|                   |  oz)                 |                         |
+-------------------+----------------------+-------------------------+
| Height            | -                    | -                       |
+-------------------+----------------------+-------------------------+
| Body Mass Index   | 20.77                | 2019  4:05 PM PST |
+-------------------+----------------------+-------------------------+
 in this encounter
 
 Medications at Time of Discharge
 
 
+----------------------+----------------------+--------+---------+----------+-----------+
| Medication           | Sig.                 | Disp.  | Refills | Start    | End Date  |
|                      |                      |        |         | Date     |           |
+----------------------+----------------------+--------+---------+----------+-----------+
 
|   albuterol          | Inhale 2 puffs into  |        |         |          |           |
| (PROVENTIL           | the lungs every 4    |        |         |          |           |
| HFA;VENTOLIN HFA)    | (four) hours as      |        |         |          |           |
| 108 (90 Base)        | needed for Wheezing. |        |         |          |           |
| MCG/ACT              |                      |        |         |          |           |
| inhalerIndications:  |                      |        |         |          |           |
| states uses 2x       |                      |        |         |          |           |
| monthly average      |                      |        |         |          |           |
+----------------------+----------------------+--------+---------+----------+-----------+
|   esomeprazole       | Take 1 capsule by    |        |         |          |           |
| (NEXIUM) 40 MG       | mouth every day      |        |         |          |           |
| capsule              |                      |        |         |          |           |
+----------------------+----------------------+--------+---------+----------+-----------+
|   insulin aspart     | Inject  into the     |        |         |          |           |
| (NOVOLOG) 100        | skin 3 (three) times |        |         |          |           |
| UNIT/ML injection    |  daily before meals. |        |         |          |           |
|                      |  Sliding scale       |        |         |          |           |
+----------------------+----------------------+--------+---------+----------+-----------+
|   isosorbide         | Take 1 tablet by     |   30   | 1       | 20 |  |
| mononitrate (IMDUR)  | mouth daily.         | tablet |         | 18       | 9         |
| 60 MG 24 hr tablet   |                      |        |         |          |           |
+----------------------+----------------------+--------+---------+----------+-----------+
|   nortriptyline      | Take 25-75 mg by     |        |         |          |           |
| (PAMELOR) 25 MG      | mouth See Admin      |        |         |          |           |
| capsule              | Instructions. Takes  |        |         |          |           |
|                      | 25 mg by mouth every |        |         |          |           |
|                      |  morning and 75 mg   |        |         |          |           |
|                      | every night          |        |         |          |           |
+----------------------+----------------------+--------+---------+----------+-----------+
|   ondansetron        | Take 4 mg by mouth   |        |         | 20 |           |
| (ZOFRAN-ODT) 4 MG    | every 8 (eight)      |        |         | 18       |           |
| disintegrating       | hours as needed.     |        |         |          |           |
| tablet               |                      |        |         |          |           |
+----------------------+----------------------+--------+---------+----------+-----------+
|   oxybutynin         | Take 7.5 mg by mouth |        |         |          |           |
| (DITROPAN) 5 MG      |  2 (two) times       |        |         |          |           |
| tablet               | daily.               |        |         |          |           |
+----------------------+----------------------+--------+---------+----------+-----------+
|   rOPINIRole         | Take 0.75 mg by      |        |         |          |           |
| (REQUIP) 0.25 MG     | mouth nightly.       |        |         |          |           |
| tablet               |                      |        |         |          |           |
+----------------------+----------------------+--------+---------+----------+-----------+
|   apixaban (ELIQUIS) | Take 1 tablet by     |   60   | 0       | 20 |           |
|  2.5 MG tablet       | mouth 2 (two) times  | tablet |         | 18       |           |
|                      | daily.               |        |         |          |           |
+----------------------+----------------------+--------+---------+----------+-----------+
|   atorvastatin       | Take 80 mg by mouth  |        |         | 20 |           |
| (LIPITOR) 80 MG      | nightly.             |        |         | 19       |           |
| tablet               |                      |        |         |          |           |
+----------------------+----------------------+--------+---------+----------+-----------+
|   carvedilol (COREG) | Take 1 tablet by     |   60   | 0       | 20 |  |
|  12.5 MG tablet      | mouth 2 (two) times  | tablet |         | 19       | 0         |
|                      | daily with meals.    |        |         |          |           |
+----------------------+----------------------+--------+---------+----------+-----------+
|                      | Take 1 tablet by     |   30   | 0       | 20 |           |
| HYDROcodone-acetamin | mouth every 4 (four) | tablet |         | 19       |           |
| ophen (NORCO) 5-325  |  hours as needed.    |        |         |          |           |
| MG per tablet        |                      |        |         |          |           |
+----------------------+----------------------+--------+---------+----------+-----------+
|   insulin degludec   | Inject 21 units      |        |         |          |           |
 
| (TRESIBA) 100        | every night          |        |         |          |           |
| UNIT/ML injection    |                      |        |         |          |           |
+----------------------+----------------------+--------+---------+----------+-----------+
|   losartan (COZAAR)  | Take 100 mg by mouth |        |         | 20 |           |
| 100 MG tablet        |  daily.              |        |         | 19       |           |
+----------------------+----------------------+--------+---------+----------+-----------+
|   nitroGLYCERIN      | Place 1 tablet under |   30   | 0       | 20 |  |
| (NITROSTAT) 0.4 MG   |  the tongue every 5  | tablet |         | 19       | 0         |
| SL tablet            | (five) minutes as    |        |         |          |           |
|                      | needed for Chest     |        |         |          |           |
|                      | pain.                |        |         |          |           |
+----------------------+----------------------+--------+---------+----------+-----------+
|   prochlorperazine 5 | TAKE ONE TABLET BY   |   60   | 11      | 20 |           |
|  MG tablet           | MOUTH TWICE DAILY AS | tablet |         | 19       |           |
|                      |  NEEDED FOR NAUSEA   |        |         |          |           |
+----------------------+----------------------+--------+---------+----------+-----------+
|   TRINTELLIX 20 MG   | Take 20 mg by mouth  |        |         | 20 |           |
| TABS                 | every evening.       |        |         | 19       |           |
+----------------------+----------------------+--------+---------+----------+-----------+
|   calcium acetate    | 667 mg 3 (three)     |        |         | 20 |  |
| (PHOSLO) 667 MG      | times daily with     |        |         | 16       | 9         |
| capsule              | meals.               |        |         |          |           |
+----------------------+----------------------+--------+---------+----------+-----------+
|   loperamide         | 2 mg as needed.      |        |         |          |  |
| (IMODIUM) 2 MG       |                      |        |         |          | 9         |
| capsule              |                      |        |         |          |           |
+----------------------+----------------------+--------+---------+----------+-----------+
|   Vortioxetine HBr   | 10 mg nightly.       |        |         |          |  |
| 10 MG TABS           |                      |        |         |          | 9         |
+----------------------+----------------------+--------+---------+----------+-----------+
|   albuterol          | Ventolin HFA 90      |        |         |          |  |
| (VENTOLIN HFA) 108   | mcg/actuation        |        |         |          | 9         |
| (90 Base) MCG/ACT    | aerosol inhaler      |        |         |          |           |
| inhaler              | Inhale 2 puffs every |        |         |          |           |
|                      |  4 hours by          |        |         |          |           |
|                      | inhalation route as  |        |         |          |           |
|                      | needed.              |        |         |          |           |
+----------------------+----------------------+--------+---------+----------+-----------+
|   atorvastatin       | Take 1 tablet by     |   30   | 0       | 20 |  |
| (LIPITOR) 40 MG      | mouth nightly.       | tablet |         | 19       | 9         |
| tablet               |                      |        |         |          |           |
+----------------------+----------------------+--------+---------+----------+-----------+
|   bisacodyl          | Place 10 mg          |        |         |          |  |
| (DULCOLAX) 10 MG     | rectally.            |        |         |          | 9         |
| suppository          |                      |        |         |          |           |
+----------------------+----------------------+--------+---------+----------+-----------+
|   Cholecalciferol    | Take 5,000 capsules  |        |         |          |  |
| (VITAMIN D3) 5000    | by mouth every other |        |         |          | 9         |
| UNITS CAPS           |  day.                |        |         |          |           |
+----------------------+----------------------+--------+---------+----------+-----------+
|   clopidogrel        | Take 1 tablet by     |   30   | 11      | 20 |  |
| (PLAVIX) 75 MG       | mouth daily.         | tablet |         | 18       | 9         |
| tablet               |                      |        |         |          |           |
+----------------------+----------------------+--------+---------+----------+-----------+
|   LORazepam (ATIVAN) | Take 1 tablet by     |   30   | 0       | 20 | 02/15/201 |
|  1 MG tablet         | mouth every 8        | tablet |         | 19       | 9         |
|                      | (eight) hours as     |        |         |          |           |
|                      | needed for Anxiety.  |        |         |          |           |
+----------------------+----------------------+--------+---------+----------+-----------+
|   Magnesium          | Take 1 tablet by     |        |         |          |  |
 
| Cl-Calcium Carbonate | mouth every other    |        |         |          | 9         |
|  (SLOW-MAG) 71.5-119 | day.                 |        |         |          |           |
|  MG TBEC             |                      |        |         |          |           |
+----------------------+----------------------+--------+---------+----------+-----------+
|   ranitidine         | ranitidine 150 mg    |        |         |          |  |
| (ZANTAC) 150 MG      | tablet Take 1 tablet |        |         |          | 9         |
| tablet               |  twice a day by oral |        |         |          |           |
|                      |  route.              |        |         |          |           |
+----------------------+----------------------+--------+---------+----------+-----------+
 as of this encounter
 
 Plan of Treatment
 
 
+--------+---------+-----------+----------------------+-------------+
| Date   | Type    | Specialty | Care Team            | Description |
+--------+---------+-----------+----------------------+-------------+
| 04/10/ | Office  | Podiatry  |   Srinivasan Jordan,  |             |
| 2019   | Visit   |           | DPM  [address] |             |
|        |         |           |  [address]  [place],  |             |
|        |         |           | [place] [address]             |             |
|        |         |           | [phone]         |             |
|        |         |           | [phone] (Fax)   |             |
+--------+---------+-----------+----------------------+-------------+
 as of this encounter
 
 Procedures
 
 
+-----------------+--------+-------------+----------------------+----------------------+
| Procedure Name  | Priori | Date/Time   | Associated Diagnosis | Comments             |
|                 | ty     |             |                      |                      |
+-----------------+--------+-------------+----------------------+----------------------+
| ED INFORMATION  | Routin | 2019  |                      |   Results for this   |
| EXCHANGE        | e      |  3:58 PM    |                      | procedure are in the |
|                 |        | PST         |                      |  results section.    |
+-----------------+--------+-------------+----------------------+----------------------+
 in this encounter
 
 Results
 ED INFORMATION EXCHANGE (2019  3:58 PM)
 
+------------------------------------------------------------------------+--------------+
| Narrative                                                              | Performed At |
+------------------------------------------------------------------------+--------------+
|   [id number]     Criteria Met         Care        |   ED         |
| Guidelines      10 in      Security and Safety  No recent Security   | INFORMATION  |
| Events currently on file     ED Care Guidelines from [facility] -        | EXCHANGE     |
| [place]  Last Updated: 18 10:16 AM         Other Information:    |              |
| Currently being seen by [facility] in [place] for mental health        |              |
| [phone]  These are guidelines and the provider should    |              |
| exercise clinical judgment when providing care.  ED Care Guidelines    |              |
| from [facility]  Last Updated: 17 10:44 AM         Care |              |
|  Coordination:  This patient has been identified as having at          |              |
| leastEmergency Departments visits in the 12 months immediately         |              |
| preceding the date these guidelines were entered.Patient requires      |              |
| education on appropriate ED usage.Emphasize the importance of using    |              |
| outpatient   medical services for the treatment of chronic conditions. |              |
|   Please contact Community Health WorkerWillard at [phone] if   |              |
| patient is seen in ED.  These are guidelines and the provider should   |              |
 
| exercise clinical judgment when providing care.  These are guidelines  |              |
| and the provider should exercise clinical judgment when providing      |              |
| care.           Prescription Drug Report (12 Mo.)  PDMP query found no |              |
|  report.        E.D. Visit Count (12 mo.)  Facility Visits Low Acuity  |              |
|   [facility] 18 0   [facility] 2 0   [facility]   |              |
| [facility] 4 0   Total 24 0   Note: Visits indicate total |              |
|  known visits. Medicaid Low Acuity Dx are the number of primary        |              |
| diagnoses on the Medicaid's Low Acuity dx list.         Recent         |              |
| Emergency Department Visit Summary  Showing 10 most recent visits out  |              |
| of 24 in the past 12 months  Date [facility] State Type Diagnoses   |              |
| or Chief Complaint   2019 [facility] [place]       |              |
| Emergency          Multiple Falls        Referral        Circulatory   |              |
| Problem      2019 [facility] [place] Emergency      |              |
|      ABD PAIN        Other chest pain      2019 [facility]         |              |
| [facility] [place] Emergency          Foot Pain      [facility]              |
[facility] [place] [place] [facility]          Chest Pain          |              |
| Nausea        Shortness of Breath        Chest pain, unspecified       |              |
|      Elevated blood-pressure reading, without diagnosis of             |              |
| hypertension        Presence of aortocoronary bypass graft             |              |
| Other specified postprocedural states      2019 [facility]  |              |
| [facility] [place] Emergency          CHEST PAIN        Abnormal levels  |              |
| of other serum enzymes        Personal history of other diseases of    |              |
| the circulatory system        Chest pain, unspecified      2019 |              |
|  [facility] [place] Emergency          FISTULA BLEEDING    |              |
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
| grafts, initial encounter      Dec 22, 2018 [facility]       |              |
| [place] Emergency          CHEST PAIN        Type 2 diabetes         |              |
| mellitus with hyperglycemia        Chest pain, unspecified      Nov    |              |
| 2018 [facility] [place] Emergency    Chief Complaint:   |              |
| SOB      2018 [facility] [place]. [place] Emergency         |              |
|     FALL        Unspecified fall, initial encounter        Dependence  |              |
| on renal dialysis        End stage renal disease      2018     |              |
| [facility] [place]. [place] Emergency          FALL                 |              |
| Essential (primary) hypertension        Type 2 diabetes mellitus with  |              |
| hyperglycemia        Type 2 diabetes mellitus with unspecified         |              |
| complications        Unspecified fall, initial encounter               |              |
| Headache            Recent Inpatient Visit Summary  Showing 10 most    |              |
| recent visits out of 11 in the past 12 months  Date [facility]      |              |
| State Type Diagnoses or Chief Complaint   2019 [facility] |              |
|  [place] Vascular Surgery          Foot Pain        End stage   |              |
| renal disease        Dependence on renal dialysis        Peripheral    |              |
| vascular disease, unspecified        Anemia in chronic kidney disease  |              |
|        Other disorders of plasma-protein metabolism, not elsewhere     |              |
| classified        Other specified personal risk factors, not elsewhere |              |
|  classified      [facility] [place]           |              |
| Recovery          Peripheral arterial disease, osteomyelitis left foot |              |
|         Other acute osteomyelitis, left ankle and foot        Long     |              |
| term (current) use of antibiotics        End stage renal disease       |              |
|      Anemia in chronic kidney disease      Dec 5, 2018 [facility] |              |
|  [facility] [place] [facility]          Peripheral vascular disease, |              |
|  unspecified        End stage renal disease        Dependence on renal |              |
|  dialysis        Long term (current) use of insulin        Other       |              |
| chronic osteomyelitis, left ankle and foot        Type 2 diabetes      |              |
| mellitus with diabetic chronic kidney disease        Essential         |              |
| (primary) hypertension      [facility] [place] [place] [facility]          Upper GIB        Other chronic osteomyelitis,    |              |
| unspecified ankle and foot        End stage renal disease        Other |              |
|  specified personal risk factors, not elsewhere classified             |              |
| Chronic systolic (congestive) heart failure        Anemia in chronic   |              |
| kidney disease        Other disorders of plasma-protein metabolism,    |              |
 
| not elsewhere classified        Moderate protein-calorie malnutrition  |              |
|        Other disorders of phosphorus metabolism      2018      |              |
| [place]. [place] Medical Surgical          1. Type 2      |              |
| diabetes mellitus with other specified complication        2. Urinary  |              |
| tract infection, site not specified        3. Unspecified              |              |
| protein-calorie malnutrition        4. Other chronic osteomyelitis,    |              |
| unspecified site        5. Hypertensive heart and chronic kidney       |              |
| disease with heart failure and with stage 5 chronic kidney disease, or |              |
|  end stage renal disease        6. Chronic diastolic (congestive)      |              |
| heart failure        7. Other osteomyelitis, ankle and foot        8.  |              |
| Type 2 diabetes mellitus with foot ulcer        9. Atherosclerotic     |              |
| heart disease of native coronary artery without angina pectoris        |              |
|  10. Chronic obstructive pulmonary disease, unspecified      ,    |              |
|  [facility] [place] [place] [facility]          1. Benign |              |
|  paroxysmal vertigo, unspecified ear        2. End stage renal disease |              |
|         3. Hypertensive chronic kidney disease with stage 5 chronic    |              |
| kidney disease or end stage renal disease        4. Urinary tract      |              |
| infection, site not specified        5. Hypertensive heart and chronic |              |
|  kidney disease with heart failure and with stage 5 chronic kidney     |              |
| disease, or end stage renal disease        6. Kidney transplant status |              |
|         7. Unspecified systolic (congestive) heart failure        8.   |              |
| Syncope and collapse        9. Type 2 diabetes mellitus with diabetic  |              |
| chronic kidney disease        10. Atherosclerotic heart disease of     |              |
| native coronary artery without angina pectoris      Sep 15, 2018       |              |
[facility] [place] [facility]          Acute     |              |
| ischemic heart disease, unspecified        Long term (current) use of  |              |
| insulin        Dependence on renal dialysis        End stage renal     |              |
| disease        Type 2 diabetes mellitus with diabetic chronic kidney   |              |
| disease        Essential (primary) hypertension        Anemia in       |              |
| chronic kidney disease      2018 [facility] |              |
|  Medical Surgical          0. Cough        1. Pneumonia due to         |              |
| Pseudomonas        2. End stage renal disease        3. Hypertensive   |              |
| heart and chronic kidney disease with heart failure and with stage 5   |              |
| chronic kidney disease, or end stage renal disease        4. Cachexia  |              |
|        5. Chronic obstructive pulmonary disease with acute lower       |              |
| respiratory infection        6. Type 2 diabetes mellitus with diabetic |              |
|  chronic kidney disease        7. Heart failure, unspecified        8. |              |
|  Hyperlipidemia, unspecified        9. Gastro-esophageal reflux        |              |
| disease without esophagitis      2018 [facility]       |              |
| [facility] Medical Surgical          0. Other chest pain        1.      |              |
| Gastro-esophageal reflux disease without esophagitis        2. End     |              |
| stage renal disease        3. Hypertensive heart and chronic kidney    |              |
| disease with heart failure and with stage 5 chronic kidney disease, or |              |
|  end stage renal disease        4. Chronic systolic (congestive) heart |              |
|  failure        5. Atherosclerotic heart disease of native coronary    |              |
| artery with other forms of angina pectoris        6. Type 2 diabetes   |              |
| mellitus with diabetic chronic kidney disease        7.                |              |
| Hyperlipidemia, unspecified        8. Major depressive disorder,       |              |
| single episode, unspecified        9. Chronic obstructive pulmonary    |              |
| disease, unspecified      Mar 6, 2018 [facility]     |              |
| [facility]          Heart Failure        Need for iv access  |              |
|        Type 2 diabetes mellitus with other specified complication      |              |
|      Long term (current) use of insulin        Paroxysmal atrial       |              |
| fibrillation        Anemia in chronic kidney disease                   |              |
| Atherosclerotic heart disease of native coronary artery without angina |              |
|  pectoris        Cardiomyopathy, unspecified        End stage renal    |              |
| disease        Other specified postprocedural states            Care   |              |
| Providers  Provider PRC Type Phone Fax Service Dates   HEADINGS, SANTIAGO, |              |
|  F.N.P. Nurse Practitioner: Family     Current      Marco Antonio Martinez     |              |
| /Care Coordinator [phone]    2019 -          |              |
 
| Current      [facility] Antonio Martinez [facility] [phone]    2019 |              |
|  - Current      [facility]    ([phone])    |              |
| [phone] Current      [facility] Primary     |              |
| Care     Current      MANOLO GONZALEZ Primary Care     Current            |              |
| [facility] Provider [phone]     |              |
| Current      [facility] Case or Care Manager     Current      [facility]   |              |
| MIRTA Sr Case or Care Manager [phone]     |              |
| [phone] Current      Jairo Gutierrez MD Other     Current           |              |
| [employer]  This patient has registered at the [facility]  |              |
| [facility] Emergency Department   For more information visit:      |              |
| [email or web address] |              |
| [id number]   The above information is provided for the sole purpose of     |              |
| patient treatment. Use of this information beyond the terms of Data    |              |
| Sharing Memorandum of Understanding and License Agreement is           |              |
| prohibited. In certain cases not all visits may be represented.        |              |
| Consult the aforementioned facilities for additional information.      |              |
| 2019 [email or web address] - [place], [place] -      |              |
| [email or web address]                                         |              |
+------------------------------------------------------------------------+--------------+
 
 
 
+-------------------------------------------------------------------------------------------
--------------------------------------------------------------------------------------------
--------------------+
| Procedure Note                                                                            
                                                                                            
                    |
+-------------------------------------------------------------------------------------------
--------------------------------------------------------------------------------------------
--------------------+
|   Interface, Lab - 2019  4:00 PM PST  Formatting of this note may be different      
                                                                                            
                    |
| from the original.[id number] Met  Care Guidelines  10 in     
                                                                                            
                    |
| 12Security and SafetyNo recent Security Events currently on fileED Care Guidelines from   
                                                                                            
                    |
| Myranda  HollycierraLast Updated: 18 10:16 AM  Other Information:Currently being      
                                                                                            
                    |
| seen by Myranda in [place] for mental health concerns.[phone] are            
                                                                                            
                    |
| guidelines and the provider should exercise clinical judgment when providing care.ED      
                                                                                            
                    |
| Care Guidelines from [facility] Updated: 17 10:44 AM  Care             
                                                                                            
                    |
| Coordination:This patient has been identified as having at leastEmergency Departments     
                                                                                            
                    |
| visits in the 12 months immediately preceding the date these guidelines were              
                                                                                            
                    |
| entered.Patient requires education on appropriate ED usage.Emphasize the importance of    
                                                                                            
 
                    |
| using outpatient medical services for the treatment of chronic conditions.Please contact  
                                                                                            
                    |
|  Community Health WorkerWillard at [phone] if patient is seen in ED.These are      
                                                                                            
                    |
| guidelines and the provider should exercise clinical judgment when providing care.These   
                                                                                            
                    |
| are guidelines and the provider should exercise clinical judgment when providing          
                                                                                            
                    |
| care.Prescription Drug Report (12 Mo.)PDMP query found no report.E.D. Visit Count (12     
                                                                                            
                    |
| mo.)Facility Visits Low Acuity [facility] 18 0 [facility] 2 0    
                                                                                            
                    |
| [facility] 4 0 Total 24 0 Note: Visits indicate total known visits.   
                                                                                            
                    |
| Medicaid Low Acuity Dx are the number of primary diagnoses on the Medicaid's Low Acuity   
                                                                                            
                    |
| dx list.  Recent Emergency Department Visit SummaryShowing 10 most recent visits out of   
                                                                                            
                    |
| 24 in the past 12 monthsDate Facility City State Type Diagnoses or Chief Complaint Feb    
                                                                                            
                    |
| 2019 [facility] [place] [place]. [place] Emergency    Multiple Falls    Referral           
                                                                                            
                    |
| Circulatory Problem  2019 [facility]. [place] Emergency    ABD PAIN     
                                                                                            
                    |
|  Other chest pain  2019 [facility] [place] Emergency    Foot Pain     
                                                                                            
                    |
| 2019 [facility] [place]. [place] Emergency    Chest Pain    Nausea             
                                                                                            
                    |
| Shortness of Breath    Chest pain, unspecified    Elevated blood-pressure reading,        
                                                                                            
                    |
| without diagnosis of hypertension    Presence of aortocoronary bypass graft    Other      
                                                                                            
                    |
| specified postprocedural states  2019 [facility] [place] Emergency    
                                                                                            
                    |
|   CHEST PAIN    Abnormal levels of other serum enzymes    Personal history of other       
                                                                                            
                    |
| diseases of the circulatory system    Chest pain, unspecified  2019 [facility]  
                                                                                            
                    |
|  [facility] [place]. [place] Emergency    FISTULA BLEEDING    Hemorrhage due to vascular prosthetic  
                                                                                            
 
                    |
|  devices, implants and grafts, initial encounter  Dec 22, 2018 [facility]       
                                                                                            
                    |
| [place]. [place] Emergency    CHEST PAIN    Type 2 diabetes mellitus with hyperglycemia          
                                                                                            
                    |
| Chest pain, unspecified  2018 [facility] [place] [place] [facility]  Chief      
                                                                                            
                    |
| Complaint: SOB  2018 [facility] [place] Emergency    FALL             
                                                                                            
                    |
| Unspecified fall, initial encounter    Dependence on renal dialysis    End stage renal    
                                                                                            
                    |
| disease  2018 [facility] [place] Emergency    FALL    Essential       
                                                                                            
                    |
| (primary) hypertension    Type 2 diabetes mellitus with hyperglycemia    Type 2 diabetes  
                                                                                            
                    |
|  mellitus with unspecified complications    Unspecified fall, initial encounter           
                                                                                            
                    |
| Headache  Recent Inpatient Visit SummaryShowing 10 most recent visits out of 11 in the    
                                                                                            
                    |
| past 12 monthsDate Facility City State Type Diagnoses or Chief Complaint 2019     
                                                                                            
                    |
| [facility] [place] Vascular Surgery    Foot Pain    End stage renal disease   
                                                                                            
                    |
|    Dependence on renal dialysis    Peripheral vascular disease, unspecified    Anemia in  
                                                                                            
                    |
|  chronic kidney disease    Other disorders of plasma-protein metabolism, not elsewhere    
                                                                                            
                    |
| classified    Other specified personal risk factors, not elsewhere classified  Dec 27,    
                                                                                            
                    |
|  [facility] [place] Recovery    Peripheral arterial disease,              
                                                                                            
                    |
| osteomyelitis left foot    Other acute osteomyelitis, left ankle and foot    Long term    
                                                                                            
                    |
| (current) use of antibiotics    End stage renal disease    Anemia in chronic kidney       
                                                                                            
                    |
| disease  Dec 5, 2018 [facility] [place] General Medicine    Peripheral        
                                                                                            
                    |
| vascular disease, unspecified    End stage renal disease    Dependence on renal dialysis  
                                                                                            
                    |
|     Long term (current) use of insulin    Other chronic osteomyelitis, left ankle and     
                                                                                            
 
                    |
| foot    Type 2 diabetes mellitus with diabetic chronic kidney disease    Essential        
                                                                                            
                    |
| (primary) hypertension  2018 [facility] [place] Inpatient    Upper    
                                                                                            
                    |
| GIB    Other chronic osteomyelitis, unspecified ankle and foot    End stage renal         
                                                                                            
                    |
| disease    Other specified personal risk factors, not elsewhere classified    Chronic     
                                                                                            
                    |
| systolic (congestive) heart failure    Anemia in chronic kidney disease    Other          
                                                                                            
                    |
| disorders of plasma-protein metabolism, not elsewhere classified    Moderate              
                                                                                            
                    |
| protein-calorie malnutrition    Other disorders of phosphorus metabolism  2018    
                                                                                            
                    |
| [place]. [place] Medical Surgical    1. Type 2 diabetes mellitus with other  
                                                                                            
                    |
|  specified complication    2. Urinary tract infection, site not specified    3.           
                                                                                            
                    |
| Unspecified protein-calorie malnutrition    4. Other chronic osteomyelitis, unspecified   
                                                                                            
                    |
| site    5. Hypertensive heart and chronic kidney disease with heart failure and with      
                                                                                            
                    |
| stage 5 chronic kidney disease, or end stage renal disease    6. Chronic diastolic        
                                                                                            
                    |
| (congestive) heart failure    7. Other osteomyelitis, ankle and foot    8. Type 2         
                                                                                            
                    |
| diabetes mellitus with foot ulcer    9. Atherosclerotic heart disease of native coronary  
                                                                                            
                    |
|  artery without angina pectoris    10. Chronic obstructive pulmonary disease,             
                                                                                            
                    |
| unspecified  2018 [facility] [place] [facility]    1. Benign      
                                                                                            
                    |
| paroxysmal vertigo, unspecified ear    2. End stage renal disease    3. Hypertensive      
                                                                                            
                    |
| chronic kidney disease with stage 5 chronic kidney disease or end stage renal disease     
                                                                                            
                    |
|  4. Urinary tract infection, site not specified    5. Hypertensive heart and chronic      
                                                                                            
                    |
| kidney disease with heart failure and with stage 5 chronic kidney disease, or end stage   
                                                                                            
 
                    |
| renal disease    6. Kidney transplant status    7. Unspecified systolic (congestive)      
                                                                                            
                    |
| heart failure    8. Syncope and collapse    9. Type 2 diabetes mellitus with diabetic     
                                                                                            
                    |
| chronic kidney disease    10. Atherosclerotic heart disease of native coronary artery     
                                                                                            
                    |
| without angina pectoris  Sep 15, 2018 [facility]. [place] [facility]          
                                                                                            
                    |
| Surgical    Acute ischemic heart disease, unspecified    Long term (current) use of       
                                                                                            
                    |
| insulin    Dependence on renal dialysis    End stage renal disease    Type 2 diabetes     
                                                                                            
                    |
| mellitus with diabetic chronic kidney disease    Essential (primary) hypertension         
                                                                                            
                    |
| Anemia in chronic kidney disease  2018 [facility]. [place] [facility]      
                                                                                            
                    |
| Surgical    0. Cough    1. Pneumonia due to Pseudomonas    2. End stage renal disease     
                                                                                            
                    |
|  3. 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     
                                                                                            
                    |
| chronic kidney disease, or end stage renal disease    4. Cachexia    5. Chronic           
                                                                                            
                    |
| obstructive pulmonary disease with acute lower respiratory infection    6. Type 2         
                                                                                            
                    |
| diabetes mellitus with diabetic chronic kidney disease    7. Heart failure, unspecified   
                                                                                            
                    |
|    8. Hyperlipidemia, unspecified    9. Gastro-esophageal reflux disease without          
                                                                                            
                    |
| esophagitis  2018 [facility] [place] [facility]    0. Other       
                                                                                            
                    |
| chest pain    1. Gastro-esophageal reflux disease without esophagitis    2. End stage     
                                                                                            
                    |
| renal disease    3. Hypertensive heart and chronic kidney disease with heart failure and  
                                                                                            
                    |
|  with stage 5 chronic kidney disease, or end stage renal disease    4. Chronic systolic   
                                                                                            
                    |
| (congestive) heart failure    5. Atherosclerotic heart disease of native coronary artery  
                                                                                            
                    |
|  with other forms of angina pectoris    6. Type 2 diabetes mellitus with diabetic         
                                                                                            
 
                    |
| chronic kidney disease    7. Hyperlipidemia, unspecified    8. Major depressive           
                                                                                            
                    |
| disorder, single episode, unspecified    9. Chronic obstructive pulmonary disease,        
                                                                                            
                    |
| unspecified  Mar 6, 2018 [facility]. [place] [facility]    Heart       
                                                                                            
                    |
| Failure    Need for iv access    Type 2 diabetes mellitus with other specified            
                                                                                            
                    |
| complication    Long term (current) use of insulin    Paroxysmal atrial fibrillation      
                                                                                            
                    |
| Anemia in chronic kidney disease    Atherosclerotic heart disease of native coronary      
                                                                                            
                    |
| artery without angina pectoris    Cardiomyopathy, unspecified    End stage renal disease  
                                                                                            
                    |
|     Other specified postprocedural states  Care ProvidersProvider PRC Type Phone Fax      
                                                                                            
                    |
| Service Dates HEADINGS, SALEEM HENDERSONN.P. Nurse Practitioner: Family   Current  Michelle,      
                                                                                            
                    |
| Marco Antonio /Care Coordinator [phone]  2019 - Current  [facility]  
                                                                                            
                    |
|  Primary Care [phone]  2019 - Current  [facility]        
                                                                                            
                    |
| Primary Care  [phone] Current  [facility] Primary Care   
                                                                                            
                    |
|   Current  [facility] GONZALEZ Primary Care   Current  [facility]      
                                                                                            
                    |
| [phone] Current  [facility] Case or Care Manager   Current  [facility]  
                                                                                            
                    |
|  MIRTA Sr Case or Care Manager [phone] Current      
                                                                                            
                    |
| Jairo Gutierrez MD Other   Current  Collective PortalThis patient has registered at     
                                                                                            
                    |
| the [facility] Emergency Department For more information visit:       
                                                                                            
                    |
| [email or web address] The above    
                                                                                            
                    |
| information is provided for the sole purpose of patient treatment. Use of this            
                                                                                            
                    |
| information beyond the terms of Data Sharing Memorandum of Understanding and License      
                                                                                            
 
                    |
| Agreement is prohibited. In certain cases not all visits may be represented. Consult the  
                                                                                            
                    |
|  aforementioned facilities for additional information.  [employer]            
                                                                                            
                    |
| [employer] [facility]. [facility] [place] - [email or web address]                  
                                                                                            
                    |
|   End stage renal disease                                                                 
                                                                                            
                    |
|   Anemia in chronic kidney disease                                                        
                                                                                            
                    |
|                                                                                           
                                                                                            
                    |
|Dec 5, 2018 [facility]                                
                                                                                            
                    |
|  Peripheral vascular disease, unspecified                                                 
                                                                                            
                    |
|   End stage renal disease                                                                 
                                                                                            
                    |
|   Dependence on renal dialysis                                                            
                                                                                            
                    |
|   Long term (current) use of insulin                                                      
                                                                                            
                    |
|   Other chronic osteomyelitis, left ankle and foot                                        
                                                                                            
                    |
|   Type 2 diabetes mellitus with diabetic chronic kidney disease                           
                                                                                            
                    |
|   Essential (primary) hypertension                                                        
                                                                                            
                    |
|                                                                                           
                                                                                            
                    |
|2018 [facility] Inpatient                                      
                                                                                            
                    |
|  Upper GIB                                                                                
                                                                                            
                    |
|   Other chronic osteomyelitis, unspecified ankle and foot                                 
                                                                                            
                    |
|   End stage renal disease                                                                 
                                                                                            
                    |
|   Other specified personal risk factors, not elsewhere classified                         
                                                                                            
 
                    |
|   Chronic systolic (congestive) heart failure                                             
                                                                                            
                    |
|   Anemia in chronic kidney disease                                                        
                                                                                            
                    |
|   Other disorders of plasma-protein metabolism, not elsewhere classified                  
                                                                                            
                    |
|   Moderate protein-calorie malnutrition                                                   
                                                                                            
                    |
|   Other disorders of phosphorus metabolism                                                
                                                                                            
                    |
|                                                                                           
                                                                                            
                    |
|2018 [facility]. [place] [facility]                                
                                                                                            
                    |
|  1. Type 2 diabetes mellitus with other specified complication                            
                                                                                            
                    |
|   2. Urinary tract infection, site not specified                                          
                                                                                            
                    |
|   3. Unspecified protein-calorie malnutrition                                             
                                                                                            
                    |
|   4. Other chronic osteomyelitis, unspecified site                                        
                                                                                            
                    |
|   5. 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
onic kidney disease, or end stage renal disease                                             
                    |
|   6. Chronic diastolic (congestive) heart failure                                         
                                                                                            
                    |
|   7. Other osteomyelitis, ankle and foot                                                  
                                                                                            
                    |
|   8. Type 2 diabetes mellitus with foot ulcer                                             
                                                                                            
                    |
|   9. Atherosclerotic heart disease of native coronary artery without angina pectoris      
                                                                                            
                    |
|   10. Chronic obstructive pulmonary disease, unspecified                                  
                                                                                            
                    |
|                                                                                           
                                                                                            
                    |
|2018 [facility] [place] [facility]                                 
                                                                                            
                    |
|  1. Benign paroxysmal vertigo, unspecified ear                                            
                                                                                            
 
                    |
|   2. End stage renal disease                                                              
                                                                                            
                    |
|   3. Hypertensive chronic kidney disease with stage 5 chronic kidney disease or end stage 
renal disease                                                                               
                    |
|   4. Urinary tract infection, site not specified                                          
                                                                                            
                    |
|   5. 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
onic kidney disease, or end stage renal disease                                             
                    |
|   6. Kidney transplant status                                                             
                                                                                            
                    |
|   7. Unspecified systolic (congestive) heart failure                                      
                                                                                            
                    |
|   8. Syncope and collapse                                                                 
                                                                                            
                    |
|   9. Type 2 diabetes mellitus with diabetic chronic kidney disease                        
                                                                                            
                    |
|   10. Atherosclerotic heart disease of native coronary artery without angina pectoris     
                                                                                            
                    |
|                                                                                           
                                                                                            
                    |
|[facility] [place] [facility]                           
                                                                                            
                    |
|  Acute ischemic heart disease, unspecified                                                
                                                                                            
                    |
|   Long term (current) use of insulin                                                      
                                                                                            
                    |
|   Dependence on renal dialysis                                                            
                                                                                            
                    |
|   End stage renal disease                                                                 
                                                                                            
                    |
|   Type 2 diabetes mellitus with diabetic chronic kidney disease                           
                                                                                            
                    |
|   Essential (primary) hypertension                                                        
                                                                                            
                    |
|   Anemia in chronic kidney disease                                                        
                                                                                            
                    |
|                                                                                           
                                                                                            
                    |
|2018 [facility]. [place] [facility]                                
                                                                                            
 
                    |
|  0. Cough                                                                                 
                                                                                            
                    |
|   1. Pneumonia due to Pseudomonas                                                         
                                                                                            
                    |
|   2. End stage renal disease                                                              
                                                                                            
                    |
|   3. 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
onic kidney disease, or end stage renal disease                                             
                    |
|   4. Cachexia                                                                             
                                                                                            
                    |
|   5. Chronic obstructive pulmonary disease with acute lower respiratory infection         
                                                                                            
                    |
|   6. Type 2 diabetes mellitus with diabetic chronic kidney disease                        
                                                                                            
                    |
|   7. Heart failure, unspecified                                                           
                                                                                            
                    |
|   8. Hyperlipidemia, unspecified                                                          
                                                                                            
                    |
|   9. Gastro-esophageal reflux disease without esophagitis                                 
                                                                                            
                    |
|                                                                                           
                                                                                            
                    |
|2018 [facility] Akash Cox. [place] Medical Surgical                                 
                                                                                            
                    |
|  0. Other chest pain                                                                      
                                                                                            
                    |
|   1. Gastro-esophageal reflux disease without esophagitis                                 
                                                                                            
                    |
|   2. End stage renal disease                                                              
                                                                                            
                    |
|   3. 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
onic kidney disease, or end stage renal disease                                             
                    |
|   4. Chronic systolic (congestive) heart failure                                          
                                                                                            
                    |
|   5. 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
ctoris                                                                                      
                    |
|   6. Type 2 diabetes mellitus with diabetic chronic kidney disease                        
                                                                                            
                    |
|   7. Hyperlipidemia, unspecified                                                          
                                                                                            
 
                    |
|   8. Major depressive disorder, single episode, unspecified                               
                                                                                            
                    |
|   9. Chronic obstructive pulmonary disease, unspecified                                   
                                                                                            
                    |
|                                                                                           
                                                                                            
                    |
|Mar 6, 2018 [facility] JANEEN Ramsay. [place] Cardiology                              
                                                                                            
                    |
|  Heart Failure                                                                            
                                                                                            
                    |
|   Need for iv access                                                                      
                                                                                            
                    |
|   Type 2 diabetes mellitus with other specified complication                              
                                                                                            
                    |
|   Long term (current) use of insulin                                                      
                                                                                            
                    |
|   Paroxysmal atrial fibrillation                                                          
                                                                                            
                    |
|   Anemia in chronic kidney disease                                                        
                                                                                            
                    |
|   Atherosclerotic heart disease of native coronary artery without angina pectoris         
                                                                                            
                    |
|   Cardiomyopathy, unspecified                                                             
                                                                                            
                    |
|   End stage renal disease                                                                 
                                                                                            
                    |
|   Other specified postprocedural states                                                   
                                                                                            
                    |
|                                                                                           
                                                                                            
                    |
|                                                                                           
                                                                                            
                    |
|                                                                                           
                                                                                            
                    |
|Care Providers                                                                             
                                                                                            
                    |
|Provider PRC Type Phone Fax Service Dates                                                  
                                                                                            
                    |
|JOHNS, ELZA HENDERSON Nurse Practitioner: Family   Current                                
                                                                                            
 
                    |
|Mar[facility] Antonio Martinez /Care Coordinator [phone]  2019 - Current         
                                                                                            
                    |
|[facility] Primary Care [phone]  2019 - Current                          
                                                                                            
                    |
|[facility] Primary Care  [phone] Current                         
                                                                                            
                    |
|[facility] Primary Care   Current                                
                                                                                            
                    |
|[facility] GONZALEZ Primary Care   Current                                                        
                                                                                            
                    |
|[facility] Provider [phone] Current                 
                                                                                            
                    |
|[facility] Case or Care Manager   Current                                                   
                                                                                            
                    |
|MIRTA Goel Case or Care Manager [phone] Current
                                                                                            
                    |
|Jairo Gutierrez MD Other   Current                                                       
                                                                                            
                    |
|                                                                                           
                                                                                            
                    |
|[employer]                                                                          
                                                                                            
                    |
|This patient has registered at the [facility] Emergency Department     
                                                                                            
                    |
|For more information visit: [email or web address]
[email or web address]                                                                               
                    |
|The above information is provided for the sole purpose of patient treatment. Use of this in
formation beyond the terms of Data Sharing Memorandum of Understanding and License Agreement
 is prohibited. In  |
|certain cases not all visits may be represented. Consult the aforementioned facilities for 
additional information.                                                                     
                    |
|2019 [email or web address] - [place], [place] - [email or web address]                                                                                       
                    |
+-------------------------------------------------------------------------------------------
--------------------------------------------------------------------------------------------
--------------------+
 
 
 
+-------------------+---------+--------------------+--------------+
| Performing        | Address | City/State/Zipcode | Phone Number |
| Organization      |         |                    |              |
+-------------------+---------+--------------------+--------------+
|   ED INFORMATION  |         |                    |              |
 
| EXCHANGE          |         |                    |              |
+-------------------+---------+--------------------+--------------+
 in this encounter
 
 Visit Diagnoses
 Not on filein this encounter

## 2019-04-03 NOTE — XMS
Encounter Summary
  Created on: 2019
 
 Cherie Villalobos
 External Reference #: [id number]
 : 48
 Sex: Female
 
 Demographics
 
 
+-----------------------+--------------------------+
| Address               | [address]               |
|                       | [place]  [address] |
+-----------------------+--------------------------+
| Home Phone            | [phone]          |
+-----------------------+--------------------------+
| Preferred Language    | Unknown                  |
+-----------------------+--------------------------+
| Marital Status        |                   |
+-----------------------+--------------------------+
| Advent Affiliation | 1013                     |
+-----------------------+--------------------------+
| Race                  | Unknown                  |
+-----------------------+--------------------------+
| Ethnic Group          | Unknown                  |
+-----------------------+--------------------------+
 
 
 Author
 
 
+--------------+-----------------------+
| Author       | [employer] |
+--------------+-----------------------+
| Organization | [facility] |
+--------------+-----------------------+
| Address      | Unknown               |
+--------------+-----------------------+
| Phone        | Unavailable           |
+--------------+-----------------------+
 
 
 
 Support
 
 
+---------------+--------------+---------------------+-----------------+
| Name          | Relationship | Address             | Phone           |
+---------------+--------------+---------------------+-----------------+
| Anoop Villalobos | ECON         | [place], | [phone] |
|               |              |  [place]  [address]     |                 |
+---------------+--------------+---------------------+-----------------+
| Jennifer Dickson | ECON         | [place]       | [phone] |
|               |              | [address]               |                 |
+---------------+--------------+---------------------+-----------------+
 
 
 
 
 Care Team Providers
 
 
+-----------------------+------+-----------------+
| Care Team Member Name | Role | Phone           |
+-----------------------+------+-----------------+
| Ivy Couch DO      | PCP  | [phone] |
+-----------------------+------+-----------------+
 
 
 
 Reason for Visit
 
 
+--------+-----------------+
| Reason | Comments        |
+--------+-----------------+
| Other  | caregiver/Rehab |
+--------+-----------------+
 
 
 
 Encounter Details
 
 
+--------+-----------+----------------------+----------------------+-------------------+
| Date   | Type      | Department           | Care Team            | Description       |
+--------+-----------+----------------------+----------------------+-------------------+
| / | Telephone |   [facility] |   Srinivasan Jordan,  | Other             |
| 2019   |           |  [address] Ankle  [address]      | DPM  [address], | (caregiver/Rehab) |
|        |           | [address]   |  [address]  [place],  |                   |
|        |           | [place], [place]         | [place] [address]             |                   |
|        |           | [address]           | [phone]         |                   |
|        |           | [phone]         | [phone] (Fax)   |                   |
+--------+-----------+----------------------+----------------------+-------------------+
 
 
 
 Social History
 
 
+---------------+------------+-----------+--------+------------------+
| Tobacco Use   | Types      | Packs/Day | Years  | Date             |
|               |            |           | Used   |                  |
+---------------+------------+-----------+--------+------------------+
| Former Smoker | Cigarettes | 1         | 30     | Quit: 2004 |
+---------------+------------+-----------+--------+------------------+
 
 
 
+---------------------+---+---+---+
| Smokeless Tobacco:  |   |   |   |
| Never Used          |   |   |   |
+---------------------+---+---+---+
 
 
 
+------------------------------+
| Comments: quite smoking 2004 |
 
+------------------------------+
 
 
 
+-------------+-----------+---------+----------+
| Alcohol Use | Drinks/We | oz/Week | Comments |
|             | ek        |         |          |
+-------------+-----------+---------+----------+
| No          |           |         |          |
+-------------+-----------+---------+----------+
 
 
 
+------------------+---------------+
| Sex Assigned at  | Date Recorded |
| Birth            |               |
+------------------+---------------+
| Not on file      |               |
+------------------+---------------+
 as of this encounter
 
 Plan of Treatment
 
 
+--------+---------+-----------+----------------------+-------------+
| Date   | Type    | Specialty | Care Team            | Description |
+--------+---------+-----------+----------------------+-------------+
| 04/10/ | Office  | Podiatry  |   Srinivasan Jordan,  |             |
|    | Visit   |           | DPM  [address], |             |
|        |         |           |  [address]  [place],  |             |
|        |         |           | [place] [address]             |             |
|        |         |           | [phone]         |             |
|        |         |           | [phone] (Fax)   |             |
+--------+---------+-----------+----------------------+-------------+
 as of this encounter
 
 Visit Diagnoses
 Not on filein this encounter

## 2019-04-03 NOTE — XMS
Encounter Summary
  Created on: 2019
 
 Cherie Villalobos
 External Reference #: [id number]
 : 48
 Sex: Female
 
 Demographics
 
 
+-----------------------+--------------------------+
| Address               | [address]               |
|                       | [place]  [address] |
+-----------------------+--------------------------+
| Home Phone            | [phone]          |
+-----------------------+--------------------------+
| Preferred Language    | Unknown                  |
+-----------------------+--------------------------+
| Marital Status        |                   |
+-----------------------+--------------------------+
| Anabaptist Affiliation | 1013                     |
+-----------------------+--------------------------+
| Race                  | Unknown                  |
+-----------------------+--------------------------+
| Ethnic Group          | Unknown                  |
+-----------------------+--------------------------+
 
 
 Author
 
 
+--------------+-----------------------+
| Author       | [employer] |
+--------------+-----------------------+
| Organization | [facility] |
+--------------+-----------------------+
| Address      | Unknown               |
+--------------+-----------------------+
| Phone        | Unavailable           |
+--------------+-----------------------+
 
 
 
 Support
 
 
+---------------+--------------+---------------------+-----------------+
| Name          | Relationship | Address             | Phone           |
+---------------+--------------+---------------------+-----------------+
| Anoop Villalobos | ECON         | [place], | [phone] |
|               |              |  [place]  [address]     |                 |
+---------------+--------------+---------------------+-----------------+
| Jennifer Dickson | ECON         | [place]       | [phone] |
|               |              | [address]               |                 |
+---------------+--------------+---------------------+-----------------+
 
 
 
 
 Care Team Providers
 
 
+-----------------------+------+-----------------+
| Care Team Member Name | Role | Phone           |
+-----------------------+------+-----------------+
| Ivy Couch DO      | PCP  | [phone] |
+-----------------------+------+-----------------+
 
 
 
 Encounter Details
 
 
+--------+-------------+----------------------+---------------------+-------------+
| Date   | Type        | Department           | Care Team           | Description |
+--------+-------------+----------------------+---------------------+-------------+
| / | Orders Only |   [facility] |   Luisa Segovia  |             |
| 2019   |             |  and Ankle  [address]      | [address]               |             |
|        |             | [address]   |                     |             |
|        |             | [place]         |                     |             |
|        |             | [address]           |                     |             |
|        |             | [phone]         |                     |             |
+--------+-------------+----------------------+---------------------+-------------+
 
 
 
 Social History
 
 
+---------------+------------+-----------+--------+------------------+
| Tobacco Use   | Types      | Packs/Day | Years  | Date             |
|               |            |           | Used   |                  |
+---------------+------------+-----------+--------+------------------+
| Former Smoker | Cigarettes | 1         | 30     | Quit: 2004 |
+---------------+------------+-----------+--------+------------------+
 
 
 
+---------------------+---+---+---+
| Smokeless Tobacco:  |   |   |   |
| Never Used          |   |   |   |
+---------------------+---+---+---+
 
 
 
+------------------------------+
| Comments: quite smoking 2004 |
+------------------------------+
 
 
 
+-------------+-----------+---------+----------+
| Alcohol Use | Drinks/We | oz/Week | Comments |
|             | ek        |         |          |
+-------------+-----------+---------+----------+
| No          |           |         |          |
+-------------+-----------+---------+----------+
 
 
 
 
+------------------+---------------+
| Sex Assigned at  | Date Recorded |
| Birth            |               |
+------------------+---------------+
| Not on file      |               |
+------------------+---------------+
 as of this encounter
 
 Plan of Treatment
 
 
+--------+---------+-----------+----------------------+-------------+
| Date   | Type    | Specialty | Care Team            | Description |
+--------+---------+-----------+----------------------+-------------+
| 04/10/ | Office  | Podiatry  |   Srinivasan Jordan,  |             |
| 2019   | Visit   |           | DPM  [address], |             |
|        |         |           |  [address]  [place],  |             |
|        |         |           | [place] [address]             |             |
|        |         |           | [phone]         |             |
|        |         |           | [phone] (Fax)   |             |
+--------+---------+-----------+----------------------+-------------+
 as of this encounter
 
 Visit Diagnoses
 Not on filein this encounter

## 2019-04-03 NOTE — XMS
Encounter Summary
  Created on: 2019
 
 Cherie Villalobos
 External Reference #: [id number]
 : 48
 Sex: Female
 
 Demographics
 
 
+-----------------------+--------------------------+
| Address               | [address]               |
|                       | [place]  [address] |
+-----------------------+--------------------------+
| Home Phone            | [phone]          |
+-----------------------+--------------------------+
| Preferred Language    | Unknown                  |
+-----------------------+--------------------------+
| Marital Status        |                   |
+-----------------------+--------------------------+
| Anglican Affiliation | 1013                     |
+-----------------------+--------------------------+
| Race                  | Unknown                  |
+-----------------------+--------------------------+
| Ethnic Group          | Unknown                  |
+-----------------------+--------------------------+
 
 
 Author
 
 
+--------------+-----------------------+
| Author       | Alba Ingenico |
+--------------+-----------------------+
| Organization | [facility] |
+--------------+-----------------------+
| Address      | Unknown               |
+--------------+-----------------------+
| Phone        | Unavailable           |
+--------------+-----------------------+
 
 
 
 Support
 
 
+---------------+--------------+---------------------+-----------------+
| Name          | Relationship | Address             | Phone           |
+---------------+--------------+---------------------+-----------------+
| Anoop Villalobos | ECON         | [place], | [phone] |
|               |              |  [place]  [address]     |                 |
+---------------+--------------+---------------------+-----------------+
| Jennifer Dickson | ECON         | [place]       | [phone] |
|               |              | [address]               |                 |
+---------------+--------------+---------------------+-----------------+
 
 
 
 
 Care Team Providers
 
 
+-----------------------+------+-----------------+
| Care Team Member Name | Role | Phone           |
+-----------------------+------+-----------------+
| Ivy Couch DO      | PCP  | [phone] |
+-----------------------+------+-----------------+
 
 
 
 Reason for Visit
 Auth/Cert
 
+--------+--------+-----------+--------------+--------------+--------------+
| Status | Reason | Specialty | Diagnoses /  | Referred By  | Referred To  |
|        |        |           | Procedures   | Contact      | Contact      |
+--------+--------+-----------+--------------+--------------+--------------+
|        |        |           |   Diagnoses  |              |              |
|        |        |           |  Peripheral  |              |              |
|        |        |           | arterial     |              |              |
|        |        |           | disease,     |              |              |
|        |        |           | osteomyeliti |              |              |
|        |        |           | s left foot  |              |              |
|        |        |           |  Procedures  |              |              |
|        |        |           |  FOREFOOT -  |              |              |
|        |        |           | AMPUTATION   |              |              |
+--------+--------+-----------+--------------+--------------+--------------+
 
 
 
 
 Encounter Details
 
 
+--------+-----------+----------------------+----------------------+----------------------+
| Date   | Type      | Department           | Care Team            | Description          |
+--------+-----------+----------------------+----------------------+----------------------+
| / | Hospital  |   [facility]    |   Srinivasan Jordan,  | Acute osteomyelitis  |
| 2018 - | Encounter | [facility]       | DPM  [address], | of left ankle or     |
|        |           | Surgical  [address]  |  [address]  [place],  | foot ([facility]);          |
| / |           | Blvd  [place], [place]   | [place] [address]             | Prophylactic         |
| 2019   |           | [address]  [phone]  | [phone]         | antibiotic; Anemia   |
|        |           |                      | [phone] (Fax)   | in ESRD (end-stage   |
|        |           |                      | Moiz Josue      | renal disease) ([facility]) |
|        |           |                      | MD German  [address] |                      |
|        |           |                      |  Blvd  [place], [place]  |                      |
|        |           |                      | [address]  [phone]  |                      |
|        |           |                      |  [phone] (Fax)  |                      |
|        |           |                      |  Karen [facility]en      |                      |
|        |           |                      | [address] [facility]       |                      |
|        |           |                      | [place] [address]   |                      |
|        |           |                      | [phone]         |                      |
|        |           |                      | [phone] (Fax)   |                      |
+--------+-----------+----------------------+----------------------+----------------------+
 
 
 
 Social History
 
 
 
+---------------+------------+-----------+--------+------------------+
| Tobacco Use   | Types      | Packs/Day | Years  | Date             |
|               |            |           | Used   |                  |
+---------------+------------+-----------+--------+------------------+
| Former Smoker | Cigarettes | 1         | 30     | Quit: 2004 |
+---------------+------------+-----------+--------+------------------+
 
 
 
+---------------------+---+---+---+
| Smokeless Tobacco:  |   |   |   |
| Never Used          |   |   |   |
+---------------------+---+---+---+
 
 
 
+------------------------------+
| Comments: quite smoking 2004 |
+------------------------------+
 
 
 
+-------------+-----------+---------+----------+
| Alcohol Use | Drinks/We | oz/Week | Comments |
|             | ek        |         |          |
+-------------+-----------+---------+----------+
| No          |           |         |          |
+-------------+-----------+---------+----------+
 
 
 
+------------------+---------------+
| Sex Assigned at  | Date Recorded |
| Birth            |               |
+------------------+---------------+
| Not on file      |               |
+------------------+---------------+
 as of this encounter
 
 Last Filed Vital Signs
 
 
+-------------------+----------------------+-------------------------+
| Vital Sign        | Reading              | Time Taken              |
+-------------------+----------------------+-------------------------+
| Blood Pressure    | 125/60               | 2019 11:21 AM PST |
+-------------------+----------------------+-------------------------+
| Pulse             | 76                   | 2019 11:21 AM PST |
+-------------------+----------------------+-------------------------+
| Temperature       | 36.8   C (98.3   F)  | 2019 11:21 AM PST |
+-------------------+----------------------+-------------------------+
| Respiratory Rate  | 16                   | 2019 11:21 AM PST |
+-------------------+----------------------+-------------------------+
| Oxygen Saturation | 100%                 | 2019 11:21 AM PST |
+-------------------+----------------------+-------------------------+
| Inhaled Oxygen    | -                    | -                       |
| Concentration     |                      |                         |
+-------------------+----------------------+-------------------------+
 
| Weight            | 68.7 kg (151 lb 7.3  | 2018  3:49 PM PST |
|                   | oz)                  |                         |
+-------------------+----------------------+-------------------------+
| Height            | 177.8 cm (5' 10")    | 2018  3:49 PM PST |
+-------------------+----------------------+-------------------------+
| Body Mass Index   | 21.73                | 2018  3:49 PM PST |
+-------------------+----------------------+-------------------------+
 in this encounter
 
 Discharge Summaries
 Winston Vincent DO - 2019  1:12 PM PSTFormatting of this note may be different from the 
original.
 [facility]
 Service:  Hospitalist
 Physician Discharge Summary 
 
 Patient ID:
 Cherie Villalobos
 MRN: [id number]
 1950
 
 68 y.o.
 
 Admit date: 2018
 
 Discharge date: 2019
 
 Admitting Physician: Srinivasan Jordan DPM 
 
 Discharge Physician: Winston Vincent DO
 
 Consultants: Treatment Team: 
 Consulting Physician: João Asher MD
 Consulting Physician: Michael Peguero MD
 Consulting Physician: Oumar Beard DPM
 Consulting Physician: Margarita Luz DO
 Admitting Provider: Srinivasan Jordan DPM
 
 Primary Discharge Diagnoses: 
 
 Principal Problem:
   Osteomyelitis of left foot (HCC)
 Active Problems:
   Secondary hyperparathyroidism (HCC)
   Depression
   ESRD (end stage renal disease) (HCC)
   Type 2 diabetes mellitus with chronic kidney disease on chronic dialysis, with long-term 
current use of insulin (HCC)
   Anemia in ESRD (end-stage renal disease) (HCC)
   Hypertension, essential
   Diabetic foot ulcer (HCC)
   Chronic systolic congestive heart failure (HCC)
   Chronic anticoagulation
   Cardiomyopathy (HCC)
   Paroxysmal atrial fibrillation (HCC)
   S/P CABG x 2
   S/P mitral valve repair
   PVD (peripheral vascular disease) (HCC)
   CAD (coronary artery disease)
 Resolved Problems:
 
   * No resolved hospital problems. *
 
 HPI (from h/p on /
 The patient is a 68 y.o. female with significant past medical history of  ESRD on HD every 
TTS, type 2 DM, HTN, PAD s/p angioplasty LLE, CAD s/pMI, CABG, s/p AVR in 2018,  HFrEF wit
h last EF of 20-25%, s/p AICD placement, AF on chronic anticoagulation,  was admitted for tr
ansmetatarsal amputation for osteomyelitis of the left 1st MT.  
 She has had chronic osteomyelitis of the first metatarsal and needing amputation. It was po
stponed until angioplasty of her LLE was done to optimize would healing. She underwent amput
ation today. EBL was < 200ml.  Per Dr. Jordan there was concern for residual bone infection
. He consulted ID who suggested cefepime 2gm post HD. 
 Patient was seen today at bedside, denies having chest pain, SOB, lighhteadedness, tolerati
ng clear liquid diet. 
 
 Hospital Course: 
 
 Patient was admitted to the hosptialsist service for further management of patient's non-he
aling foot ulcer. Antibiotics were managed by ID, Podiatry was consulted and evaluated patie
nt and took patient for left foot transmetatarsal amputation. Patient tolerated procedure we
ll. Nephrology was consulted and managed patient's ESRD/dialysis. During the patient's stay 
she developed chest pain that was resolved by nitroglycerin. Cardiology was consulted, kary
er there was not any interventions besides medical management that the patient was a candida
te for. Upon record review she had a stress test the month prior that was found to be negati
ve. Patient remained stable and was subsequently discharged to SNF for rehab and continued I
V antibiotics. She was given instructions to follow up with nephrology, ID, cardiology, [facility] care, and podiatry. 
 
 Past Medical History:
  
 Past Medical History 
 Diagnosis Date 
   Acute MI ([facility])  
  with stenting x 1 
   Anemia associated with chronic renal failure 2016 
   Atrial fibrillation ([facility])  
   Chronic kidney disease  
   Congestive heart failure (HCC)  
   COPD (chronic obstructive pulmonary disease) (HCC)  
   COPD (chronic obstructive pulmonary disease) ([facility]) 2018 
   Coronary artery disease  
  stent placements: 3 total 
   Depression  
   Diabetes other 
  abstracted 
   Diabetes mellitus, type 2 ([facility])  
   ESRD on peritoneal dialysis ([facility])  
   GERD (gastroesophageal reflux disease)  
   Hemodialysis patient ([facility]) 10/2016 
  Stopped peritoneal and restarted hemodialysis 
   Hemorrhage of gastrointestinal tract, unspecified 2017 
  low GI, rectal bleeding 
   High blood pressure other 
  abstracted 
   High cholesterol other 
  abstracted 
   Hyperlipidemia  
   Hypertension  
   Hyponatremia 2017 
   Myocardial infarction ([facility]) 2017 
   Other chronic pain  
 
  feet,  
   Pain of left hip joint 2016 
  due to hip fracture and replacement 
   Partial small bowel obstruction ([facility]) 2017 
  resolved 
   Renal failure  
  Stage 5.   Fistula in the JAN - not on dialysis yet. 
   Unspecified visual disturbance  
  glasses 
 
 Past Surgical History 
 Procedure Laterality Date 
   AV FISTULA PLACEMENT   
  left upper arm AV fistula - failed 
   AV FISTULA REPAIR N/A 10/25/2016 
  Procedure: AV FISTULA - GRAFT REPAIR/REVISION;  Surgeon: Kirt Mclaughlin MD;  Location: [facility]
IN OR;  Service: Vascular;  Laterality: N/A;  Superficialization and revision of left arm fi
stula 
   CARDIAC CATHETERIZATION  2017 
   CARPAL TUNNEL RELEASE Bilateral other 
  abstracted 
   CATARACT EXTRACTION Bilateral  
   CATHETER REMOVAL N/A 2016 
  Procedure: DIALYSIS CATHETER - REMOVAL;  Surgeon: Kirt Mclaughlin MD;  Location: [facility] OR; 
 Service: Vascular;  Laterality: N/A;  PD cath removal 
   CHOLECYSTECTOMY  other 
  abstracted 
   COLONOSCOPY   
   CORONARY ARTERY BYPASS GRAFT   
   CORONARY STENT PLACEMENT  2017 
  Drug Elutin stents to OM, 1 stent to prox. LAD 
   EYE SURGERY   
   FOOT AMPUTATION Left 2018 
  Procedure: FOREFOOT - AMPUTATION;  Surgeon: Srinivasan Jordan DPM;  Location: [facility] MAIN OR;
  Service: Podiatry;  Laterality: Left; 
   HARDWARE PRESENT   
  stent placements x 3, Left hip replacement, vascular stents 2 in left leg 
   HIP ARTHROPLASTY Left 2016 
  Procedure: HIP - ARTHROPLASTY(ALLISON);  Surgeon: Uriel Roa MD;  Location: [facility] MAIN 
OR;  Service: Orthopedics;  Laterality: Left; 
   HYSTERECTOMY  1998 
  complete 
   PACEMAKER INSERTION   
  [employer] pacemaker/defib 
   PERITONEAL CATHETER INSERTION  8/15/2013 
   PERITONEAL CATHETER INSERTION N/A 2016 
  Procedure: LAPAROSCOPIC - PERITONEAL DIALYSIS CATH INSERTION;  Surgeon: Kirt Mclaughlin MD;  Lo
cation: [facility] MAIN OR;  Service: Vascular;  Laterality: N/A;  Removal of old catheter 
   SHOULDER SURGERY Right  
  frozen shoulder 
   UNLISTED PROCEDURE ARTHROSCOPY   
  total Left hip 
   vascular stents Left 2017 
  leg (2 stents) 
   VASCULAR SURGERY   
 
 Discharged Condition: Stable for discharge as stated above.
 
 Significant Diagnostic Studies:
 Ct Head Without Contrast
 
 
 Result Date: 2018
 CHERIE LEBLANCEN 1950 68 years Female CT HEAD WO CONTRAST 2018 5:23 PM INDICA
TION: Altered mental status in a patient with acute osteomyelitis COMPARISON: Head CT, 2017 TECHNIQUE: CT scan of the head without contrast.  5-mm axial noncontrast images were ac
quired from the foramen magnum through the cranial vertex. Multiplanar reformations were obt
ained from the acquisition data. At least one of the following CT dose optimization techniqu
es were used: Automated exposure control; Adjustment of mA and/or kV according to patient si
ze; Use of iterative reconstruction technique. FINDINGS: Brain: No intracranial hemorrhage, 
midline shift or pathologic mass effect. No cerebral edema, mass lesion or evidence of acute
 infarct. Ventricles and extra-axial fluid spaces: Normal. Paranasal sinuses and mastoid air
 cells: Normal. Calvarium and extracranial soft tissues: Normal. Orbits: The patient is stat
us post bilateral cataract surgery. 
 
 1.  No acute intracranial pathology. Electronically signed by Steven Samano MD on  5:28 PM
 
 X-ray Chest 1 View
 
 Result Date: 2019
 CHERIE VILLALOBOS XR CHEST 1 VIEW 2019 4:43 PM HISTORY: 68 years.  Female.  Chest pa
in. TECHNIQUE: 1 view of the chest obtained at 1637 hours. COMPARISON: 2018. FINDINGS:
 A single lead ICD is seen over the right chest with an intact lead in proper position uncha
nged from the previous exam.  The sternotomy wires from prior CABG are noted.  An overlying 
EKG leads is seen.  A presumed loculated pleural fluid collection is seen overlying the left
 heart border similar to the previous exam.  This measures 3.0 x 5.2 cm in the transverse an
d craniocaudal dimensions.  Hazy opacity is seen in the lower right chest which may indicate
 the presence of a right-sided pleural effusion which is layering posteriorly.  No acute air
space disease, parenchymal nodule, mass or pneumothorax is noted.  Apical pleural thickening
 is seen.  The osseous structures are intact. 
 
 1.  Small loculated pleural fluid collection seen overlying the lateral left heart border i
n the lower left chest.  There may also be a small pleural effusion at the right lung base w
hich is layering posteriorly. 2.  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 Electronically s
igned by Eugenio Hoffman DO on 2019 4:59 PM
 
 Discharge Vitals:
 Vitals: 
  19 2301 19 0405 19 0720 19 1121 
 BP: 97/48 113/54 127/61 125/60 
 BP Location: Right upper arm Right upper arm Right upper arm Right upper arm 
 Pulse: 68 68 80 76 
 Resp: 18 18 16 16 
 Temp: 99 F (37.2 C) 98.3 F (36.8 C) 98.7 F (37.1 C) 98.3 F (36.8 C) 
 TempSrc: Oral Oral Oral Oral 
 SpO2:  97% 99% 100% 
 Weight:     
 Height:     
 
 Discharge Exam:
 
 General Appearance:    A & O x 3, no distress, appears stated age 
 Head:    Normocephalic, without obvious abnormality, atraumatic 
 Eyes:    PERRL, conjunctiva/corneas clear, EOM's intact.
   
 Ears:    Normal external ear canals, no otorrhea 
 Nose:   Nares normal, no drainage or sinus tenderness 
 Throat:   Lips, mucosa, and tongue normal; gums normal 
 Neck:   Supple, symmetrical, trachea; no carotid bruit or JVD 
 Back:     Symmetric, no curvature, ROM normal, no CVA tenderness 
 
 Lungs:     Clear to auscultation bilaterally, respirations unlabored 
 Chest Wall:    No tenderness or deformity 
  Heart:    Regular rate and rhythm, S1 and S2 normal, no murmur, rub   or gallop 
   
 Abdomen:     Soft, non-tender, bowel sounds active all four quadrants, no masses, no organo
megaly 
 Genitalia:    Deferred 
 Rectal:    Deferred
  
 Extremities:   Upper extremities no clubbing/ cyanosis/ erythema  Lower extremities  atraum
atic, no cyanosis or edema
 L foot Transmetatarsal amputation - dressing in place.  
 Pulses:   2+ and symmetric all extremities 
 Skin:   Skin warm, texture and turgor normal, no rashes or lesions 
 Lymph nodes:   No gross lymphadenopathy. 
 Neurologic:
 
 Psychiatric:   CNII-XII intact, normal strength, sensation normal
   
  Affect/ mood normal, behavior and judgement normal 
  
 
 LABS: 
 
 Recent Labs
 Lab 19
 0525 19
 0550 
 WBC 5.21 5.22 6.13 
 HGB 8.7* 8.4* 8.7* 
 HCT 26.3* 25.7* 27.0* 
  149* 156 
 NEUTOPHILPCT 69.68 72.47 71.63 
 MONOPCT 9.79 8.92 12.18 
 
 Recent Labs
 Lab 19
 0525 19
 0550 
  138 136 
 K 4.5 4.5 4.8 
 CL 99 100 98* 
 CO2 31 29 26 
 BUN 13 21 29* 
 CREATININE 3.5* 4.5* 6.7* 
 PROT 6.3 5.9* 6.4 
 BILITOT 0.4 0.3 0.4 
 ALT 17 16 17 
 AST 23 21 24 
 
 Phosphorus:  No results for input(s): PHOS in the last 168 hours.
 No results for input(s): MG in the last 168 hours.
 No results for input(s): AMYLASE in the last 168 hours.
 No results for input(s): LIPASE in the last 168 hours.
 No results for input(s): PHART, PO2ART, HAW0QIC, D2IMENSS, BEART in the last 168 hours.
 No results for input(s): APTT, INR, PTT in the last 168 hours.
 No results for input(s): TSH, T3FREE, FREET4 in the last 168 hours.
 
 
 Recent Labs
 Lab 19
 0045 19
 2110 19
 1610 19
 0532 
 CKTOTAL  --   --  22* 23* 
 TROPONINI 0.256* 0.274* 0.318*  --  
 CKMBINDEX  --   --  8.2  --  
 
 Disposition:  SNF
 
 No discharge procedures on file.
 
 Follow up:
 Ivy Couch, DO
 [address], [address]
 [facility] [address] [phone]
 
 Srinivasan Jordan, DPM
 [address], [address]
 [facility] [address]
 [phone]
 
 In 10 days
 
 Andrés Elliott MD
 [address]
 [facility] [address] [phone]
 
 Ivy Couch
 
  
 Medication List 
  
 START taking these medications  
 CefTAZidime and Dextrose 2-5 GM-%(50ML) Solr
 QTY:  30 each
 Refills:  2
 Ceftazidime 2 gm iv after each hemodialysis until 19
  
 vancomycin 1000 mg injection
 QTY:  7 each
 Refills:  3
 Commonly known as:  VANCOCIN
 500 mg iv after each hemodialysis until 19 Cbc cmp esr crp vancomycin trough  q Monday
 Follow up with DR. Elliott in 2 weeks Call Tel [phone] to schedule follow up  Fax lab res
ults to [phone]
  
  
 CHANGE how you take these medications  
 atorvastatin 40 MG tablet
 QTY:  30 tablet
 Refills:  0
 Commonly known as:  LIPITOR
 Take 1 tablet by mouth nightly.
 What changed:
  medication strength
 
  how much to take
  how to take this
  
 losartan 25 MG tablet
 QTY:  30 tablet
 Refills:  0
 Commonly known as:  COZAAR
 Take 1 tablet by mouth daily.
 What changed:  how much to take
  
  
 CONTINUE taking these medications  
 albuterol 108 (90 Base) MCG/ACT inhaler
 Refills:  0
 Commonly known as:  PROVENTIL HFA;VENTOLIN HFA
  
 apixaban 2.5 MG tablet
 QTY:  60 tablet
 Refills:  0
 Commonly known as:  ELIQUIS
 Take 1 tablet by mouth 2 (two) times daily.
  
 aspirin 81 MG EC tablet
 QTY:  30 tablet
 Refills:  0
 Take 1 tablet by mouth daily with breakfast.
  
 calcium acetate 667 MG capsule
 Refills:  0
 Commonly known as:  PHOSLO
  
 carvedilol 3.125 MG tablet
 Refills:  0
 Commonly known as:  COREG
  
 clopidogrel 75 MG tablet
 QTY:  30 tablet
 Refills:  11
 Commonly known as:  PLAVIX
 Take 1 tablet by mouth daily.
  
 esomeprazole 20 MG capsule
 Refills:  0
 Commonly known as:  NEXIUM
  
 furosemide 20 MG tablet
 Refills:  0
 Commonly known as:  LASIX
  
 HYDROcodone-acetaminophen 5-325 MG per tablet
 QTY:  30 tablet
 Refills:  0
 Commonly known as:  NORCO
 Take 1 tablet by mouth every 4 (four) hours as needed.
  
 insulin aspart 100 UNIT/ML injection
 Refills:  0
 Commonly known as:  NOVOLOG
  
 insulin degludec 100 UNIT/ML injection
 
 Refills:  0
 Commonly known as:  TRESIBA
  
 isosorbide mononitrate 60 MG 24 hr tablet
 QTY:  30 tablet
 Refills:  1
 Commonly known as:  IMDUR
 Take 1 tablet by mouth daily.
  
 loperamide 2 MG capsule
 Refills:  0
 Commonly known as:  IMODIUM
  
 LORazepam 1 MG tablet
 QTY:  20 tablet
 Refills:  0
 Commonly known as:  ATIVAN
 Take 1 tablet by mouth daily as needed for Anxiety.
  
 nitroGLYCERIN 0.4 MG SL tablet
 QTY:  30 tablet
 Refills:  0
 Doctor's comments:  Hold for SBP less than 100
 Commonly known as:  NITROSTAT
 Place 1 tablet under the tongue every 5 (five) minutes as needed for Chest pain.
  
 nortriptyline 25 MG capsule
 Refills:  0
 Commonly known as:  PAMELOR
  
 ondansetron 4 MG disintegrating tablet
 Refills:  0
 Commonly known as:  ZOFRAN-ODT
  
 oxybutynin 5 MG tablet
 Refills:  0
 Commonly known as:  DITROPAN
  
 prochlorperazine 5 MG tablet
 Refills:  0
  
 rOPINIRole 0.25 MG tablet
 Refills:  0
 Commonly known as:  REQUIP
  
 SLOW-MAG 71.5-119 MG Tbec
 Refills:  0
 Generic drug:  Magnesium Cl-Calcium Carbonate
  
 Vitamin D3 5000 units Caps
 Refills:  0
  
 Vortioxetine HBr 10 MG Tabs
 Refills:  0
  
  
 You might also be taking other medications not listed above. If you have questions about an
y of your other medications, talk to the person who prescribed them or your Primary Care Pro
vider. 
  
 
  
  
 STOP taking these medications  
 ranitidine 150 MG tablet
 Commonly known as:  ZANTAC
  
  
  
 Where to Get Your Medications 
  
 These medications were sent to [facility] [address], [place] - [address] [facility] [facility]
  [address] [place] [address] [place] [address] 
  Phone:  [phone] 
  atorvastatin 40 MG tablet
  
 You can get these medications from any pharmacy  
 Bring a paper prescription for each of these medications
  CefTAZidime and Dextrose 2-5 GM-%(50ML) Solr
  HYDROcodone-acetaminophen 5-325 MG per tablet
  LORazepam 1 MG tablet
  vancomycin 1000 mg injection
  
 
 Winston Vincent DO
 2019 3:25 PM
 
 Discharge took more than 35 minutes, to include final examination, discussion of admission,
 and preparation of prescriptions, instructions for ongoing care, follow up and dictation of
 summary.
 in this encounter
 
 Discharge Instructions
 Winston Vincent DO - 2019Follow up with outpatient cardiology, podiatry, Nephrology, GI 
and ID. 
 Diet: cardiac,renal, diabetic 1800 calorie in this encounter
 
 Medications at Time of Discharge
 
 
+----------------------+----------------------+-----------+---------+----------+-----------+
| Medication           | Sig.                 | Disp.     | Refills | Start    | End Date  |
|                      |                      |           |         | Date     |           |
+----------------------+----------------------+-----------+---------+----------+-----------+
|   albuterol          | Inhale 2 puffs into  |           |         |          |           |
| (PROVENTIL           | the lungs every 4    |           |         |          |           |
| HFA;VENTOLIN HFA)    | (four) hours as      |           |         |          |           |
| 108 (90 Base)        | needed for Wheezing. |           |         |          |           |
| MCG/ACT              |                      |           |         |          |           |
| inhalerIndications:  |                      |           |         |          |           |
| states uses 2x       |                      |           |         |          |           |
| monthly average      |                      |           |         |          |           |
+----------------------+----------------------+-----------+---------+----------+-----------+
|   esomeprazole       | Take 1 capsule by    |           |         |          |           |
| (NEXIUM) 40 MG       | mouth every day      |           |         |          |           |
| capsule              |                      |           |         |          |           |
+----------------------+----------------------+-----------+---------+----------+-----------+
|   insulin aspart     | Inject  into the     |           |         |          |           |
| (NOVOLOG) 100        | skin 3 (three) times |           |         |          |           |
| UNIT/ML injection    |  daily before meals. |           |         |          |           |
|                      |  Sliding scale       |           |         |          |           |
 
+----------------------+----------------------+-----------+---------+----------+-----------+
|   isosorbide         | Take 1 tablet by     |   30      | 1       | 20 |  |
| mononitrate (IMDUR)  | mouth daily.         | tablet    |         | 18       | 9         |
| 60 MG 24 hr tablet   |                      |           |         |          |           |
+----------------------+----------------------+-----------+---------+----------+-----------+
|   losartan (COZAAR)  | Take 1 tablet by     |   30      | 0       | 20 |           |
| 25 MG tablet         | mouth daily.         | tablet    |         | 18       |           |
+----------------------+----------------------+-----------+---------+----------+-----------+
|   nortriptyline      | Take 25-75 mg by     |           |         |          |           |
| (PAMELOR) 25 MG      | mouth See Admin      |           |         |          |           |
| capsule              | Instructions. Takes  |           |         |          |           |
|                      | 25 mg by mouth every |           |         |          |           |
|                      |  morning and 75 mg   |           |         |          |           |
|                      | every night          |           |         |          |           |
+----------------------+----------------------+-----------+---------+----------+-----------+
|   ondansetron        | Take 4 mg by mouth   |           |         | 20 |           |
| (ZOFRAN-ODT) 4 MG    | every 8 (eight)      |           |         | 18       |           |
| disintegrating       | hours as needed.     |           |         |          |           |
| tablet               |                      |           |         |          |           |
+----------------------+----------------------+-----------+---------+----------+-----------+
|   oxybutynin         | Take 7.5 mg by mouth |           |         |          |           |
| (DITROPAN) 5 MG      |  2 (two) times       |           |         |          |           |
| tablet               | daily.               |           |         |          |           |
+----------------------+----------------------+-----------+---------+----------+-----------+
|   rOPINIRole         | Take 0.75 mg by      |           |         |          |           |
| (REQUIP) 0.25 MG     | mouth nightly.       |           |         |          |           |
| tablet               |                      |           |         |          |           |
+----------------------+----------------------+-----------+---------+----------+-----------+
|   apixaban (ELIQUIS) | Take 1 tablet by     |   60      | 0       | 20 |           |
|  2.5 MG tablet       | mouth 2 (two) times  | tablet    |         | 18       |           |
|                      | daily.               |           |         |          |           |
+----------------------+----------------------+-----------+---------+----------+-----------+
|                      | Take 1 tablet by     |   30      | 0       | 20 |           |
| HYDROcodone-acetamin | mouth every 4 (four) | tablet    |         | 19       |           |
| ophen (NORCO) 5-325  |  hours as needed.    |           |         |          |           |
| MG per tablet        |                      |           |         |          |           |
+----------------------+----------------------+-----------+---------+----------+-----------+
|   insulin degludec   | Inject 21 units      |           |         |          |           |
| (TRESIBA) 100        | every night          |           |         |          |           |
| UNIT/ML injection    |                      |           |         |          |           |
+----------------------+----------------------+-----------+---------+----------+-----------+
|   calcium acetate    | 667 mg 3 (three)     |           |         | 20 |  |
| (PHOSLO) 667 MG      | times daily with     |           |         | 16       | 9         |
| capsule              | meals.               |           |         |          |           |
+----------------------+----------------------+-----------+---------+----------+-----------+
|   carvedilol (COREG) | Take 3.125 mg by     |           |         |          |  |
|  3.125 MG tablet     | mouth 2 (two) times  |           |         |          | 9         |
|                      | daily with meals.    |           |         |          |           |
+----------------------+----------------------+-----------+---------+----------+-----------+
|   furosemide (LASIX) | TAKE 1 TABLET BY     |           |         | 20 |  |
|  20 MG tablet        | MOUTH ONCE DAILY     |           |         | 18       | 9         |
+----------------------+----------------------+-----------+---------+----------+-----------+
|   loperamide         | 2 mg as needed.      |           |         |          |  |
| (IMODIUM) 2 MG       |                      |           |         |          | 9         |
| capsule              |                      |           |         |          |           |
+----------------------+----------------------+-----------+---------+----------+-----------+
|   prochlorperazine   | Take 5 mg by mouth 2 |           |         |          |  |
| (COMPAZINE) 5 MG     |  (two) times daily   |           |         |          | 9         |
| tabletIndications:   | as needed for        |           |         |          |           |
| Severe Nausea and    | Nausea. Indications: |           |         |          |           |
 
| Vomiting             |  Severe Nausea and   |           |         |          |           |
|                      | Vomiting             |           |         |          |           |
+----------------------+----------------------+-----------+---------+----------+-----------+
|   Vortioxetine HBr   | 10 mg nightly.       |           |         |          |  |
| 10 MG TABS           |                      |           |         |          | 9         |
+----------------------+----------------------+-----------+---------+----------+-----------+
|   aspirin 81 MG EC   | Take 1 tablet by     |   30      | 0       | 07/10/20 |  |
| tablet               | mouth daily with     | tablet    |         | 17       | 9         |
|                      | breakfast.           |           |         |          |           |
+----------------------+----------------------+-----------+---------+----------+-----------+
|   atorvastatin       | Take 1 tablet by     |   30      | 0       | 20 |  |
| (LIPITOR) 40 MG      | mouth nightly.       | tablet    |         | 19       | 9         |
| tablet               |                      |           |         |          |           |
+----------------------+----------------------+-----------+---------+----------+-----------+
|   CefTAZidime and    | Ceftazidime 2 gm iv  |   30 each | 2       | 20 |  |
| Dextrose 2-5         | after each           |           |         | 18       | 9         |
| GM-%(50ML) SOLR      | hemodialysis until   |           |         |          |           |
|                      | 19              |           |         |          |           |
+----------------------+----------------------+-----------+---------+----------+-----------+
|   Cholecalciferol    | Take 5,000 capsules  |           |         |          |  |
| (VITAMIN D3) 5000    | by mouth every other |           |         |          | 9         |
| UNITS CAPS           |  day.                |           |         |          |           |
+----------------------+----------------------+-----------+---------+----------+-----------+
|   clopidogrel        | Take 1 tablet by     |   30      | 11      | 20 |  |
| (PLAVIX) 75 MG       | mouth daily.         | tablet    |         | 18       | 9         |
| tablet               |                      |           |         |          |           |
+----------------------+----------------------+-----------+---------+----------+-----------+
|   LORazepam (ATIVAN) | Take 1 tablet by     |   20      | 0       | 20 |  |
|  1 MG tablet         | mouth daily as       | tablet    |         | 19       | 9         |
|                      | needed for Anxiety.  |           |         |          |           |
+----------------------+----------------------+-----------+---------+----------+-----------+
|   Magnesium          | Take 1 tablet by     |           |         |          |  |
| Cl-Calcium Carbonate | mouth every other    |           |         |          | 9         |
|  (SLOW-MAG) 71.5-119 | day.                 |           |         |          |           |
|  MG TBEC             |                      |           |         |          |           |
+----------------------+----------------------+-----------+---------+----------+-----------+
|   nitroGLYCERIN      | Place 1 tablet under |   30      | 0       | 07/10/20 |  |
| (NITROSTAT) 0.4 MG   |  the tongue every 5  | tablet    |         | 17       | 9         |
| SL tablet            | (five) minutes as    |           |         |          |           |
|                      | needed for Chest     |           |         |          |           |
|                      | pain.                |           |         |          |           |
+----------------------+----------------------+-----------+---------+----------+-----------+
|   vancomycin         | 500 mg iv after each |   7 each  | 3       | 20 |  |
| (VANCOCIN) 1000 mg   |  hemodialysis until  |           |         | 18       | 9         |
| injection            | 19Cb cmp esr   |           |         |          |           |
|                      | crp vancomycin       |           |         |          |           |
|                      | trough q             |           |         |          |           |
|                      | MondayFollow up with |           |         |          |           |
|                      |  DR. Elliott in 2       |           |         |          |           |
|                      | weeksCall Tel        |           |         |          |           |
|                      | [phone] to      |           |         |          |           |
|                      | schedule follow up   |           |         |          |           |
|                      | Fax lab results to   |           |         |          |           |
|                      | [phone]         |           |         |          |           |
+----------------------+----------------------+-----------+---------+----------+-----------+
 as of this encounter
 
 Progress Notes
 Negrito Metz MD - 2019 10:16 AM PSTFormatting of this note may be different from the
 original.
 
 [facility]
 Service:  NEPHROLOGY
 Progress Note
 
 Cherie Villalobos   68 y.o.  [id number]
 428/428-1 female   Ivy Couch
 Hospital Day:   LOS: 7 days 
 Patient with PMH as listed below admitted s/p L TMA by Dr Jordan 18
 Nephrology consulted for evaluation and management of  ARF / CKD / ESRD 
 CHRONIC
 SEVERE 
 ASSOCIATED WITH FLUID ELECTROLYTE IMBALANCES
 Assess need for hd/uf
 
 SUBJECTIVE
 Patient seen and examined 
 SAYS FEEL OK, plan to go to [facility] in [facility] but dialysis still at [facility]
 DENIES CHEST PAIN, SOB, NAUSEA, VOMITING, DIARRHEA, FEVER, CHILLS, RASH, COUGH, HEADACHE
 
 Past Medical History 
 Diagnosis Date 
   Acute MI ([facility])  
  with stenting x 1 
   Anemia associated with chronic renal failure 2016 
   Atrial fibrillation (HCC)  
   Chronic kidney disease  
   Congestive heart failure ([facility])  
   COPD (chronic obstructive pulmonary disease) ([facility])  
   COPD (chronic obstructive pulmonary disease) ([facility]) 2018 
   Coronary artery disease  
  stent placements: 3 total 
   Depression  
   Diabetes other 
  abstracted 
   Diabetes mellitus, type 2 (HCC)  
   ESRD on peritoneal dialysis (HCC)  
   GERD (gastroesophageal reflux disease)  
   Hemodialysis patient ([facility]) 10/2016 
  Stopped peritoneal and restarted hemodialysis 
   Hemorrhage of gastrointestinal tract, unspecified 2017 
  low GI, rectal bleeding 
   High blood pressure other 
  abstracted 
   High cholesterol other 
  abstracted 
   Hyperlipidemia  
   Hypertension  
   Hyponatremia 2017 
   Myocardial infarction ([facility]) 2017 
   Other chronic pain  
  feet,  
   Pain of left hip joint 2016 
  due to hip fracture and replacement 
   Partial small bowel obstruction ([facility]) 2017 
  resolved 
   Renal failure  
  Stage 5.   Fistula in the JAN - not on dialysis yet. 
   Unspecified visual disturbance  
  glasses 
  
 
 Past Surgical History 
 Procedure Laterality Date 
   AV FISTULA PLACEMENT   
  left upper arm AV fistula - failed 
   AV FISTULA REPAIR N/A 10/25/2016 
  Procedure: AV FISTULA - GRAFT REPAIR/REVISION;  Surgeon: Kirt Mclaughlin MD;  Location: [facility]
IN OR;  Service: Vascular;  Laterality: N/A;  Superficialization and revision of left arm fi
stula 
   CARDIAC CATHETERIZATION  2017 
   CARPAL TUNNEL RELEASE Bilateral other 
  abstracted 
   CATARACT EXTRACTION Bilateral 2007 
   CATHETER REMOVAL N/A 2016 
  Procedure: DIALYSIS CATHETER - REMOVAL;  Surgeon: Kirt Mclaughlin MD;  Location: [facility] MAIN OR; 
 Service: Vascular;  Laterality: N/A;  PD cath removal 
   CHOLECYSTECTOMY  other 
  abstracted 
   COLONOSCOPY   
   CORONARY ARTERY BYPASS GRAFT   
   CORONARY STENT PLACEMENT  2017 
  Drug Elutin stents to OM, 1 stent to prox. LAD 
   EYE SURGERY   
   FOOT AMPUTATION Left 2018 
  Procedure: FOREFOOT - AMPUTATION;  Surgeon: Srinivasan Jordan DPM;  Location: [facility] MAIN OR;
  Service: Podiatry;  Laterality: Left; 
   HARDWARE PRESENT   
  stent placements x 3, Left hip replacement, vascular stents 2 in left leg 
   HIP ARTHROPLASTY Left 2016 
  Procedure: HIP - ARTHROPLASTY(ALLISON);  Surgeon: Uriel Roa MD;  Location: [facility] MAIN 
OR;  Service: Orthopedics;  Laterality: Left; 
   HYSTERECTOMY   
  complete 
   PACEMAKER INSERTION   
  [employer] pacemaker/defib 
   PERITONEAL CATHETER INSERTION  8/15/2013 
   PERITONEAL CATHETER INSERTION N/A 2016 
  Procedure: LAPAROSCOPIC - PERITONEAL DIALYSIS CATH INSERTION;  Surgeon: Kirt Mclaughlin MD;  Lo
cation: [facility] MAIN OR;  Service: Vascular;  Laterality: N/A;  Removal of old catheter 
   SHOULDER SURGERY Right  
  frozen shoulder 
   UNLISTED PROCEDURE ARTHROSCOPY   
  total Left hip 
   vascular stents Left 2017 
  leg (2 stents) 
   VASCULAR SURGERY   
  
 Family History 
 Problem Relation Age of Onset 
   High cholesterol Father  
   Hypertension Father  
   Diabetes Paternal Grandmother  
   Malig hypertherm Neg Hx  
   Kidney disease Neg Hx  
  
 Social History 
 
 Social History 
   Marital status:  
   Spouse name: N/A 
   Number of children: N/A 
 
   Years of education: N/A 
 
 Occupational History 
   Not on file. 
 
 Social History Main Topics 
   Smoking status: Former Smoker 
   Packs/day: 1.00 
   Years: 30.00 
   Types: Cigarettes 
   Quit date: 2004 
   Smokeless tobacco: Never Used 
    Comment: quite smoking  
   Alcohol use No 
   Drug use: No 
   Sexual activity: No 
 
 Other Topics Concern 
   Not on file 
 
 Social History Narrative 
  She lives with . 2 kids. Worked in banking 
  
 
 Scheduled Medications 
   apixaban  2.5 mg Oral BID 
   aspirin  81 mg Oral Daily with breakfast 
   atorvastatin  40 mg Oral Nightly 
   calcium acetate  667 mg Oral TID WC 
   carvedilol  3.125 mg Oral BID WC 
   cefTAZidime  2 g Intravenous After Hemodialysis (see admin instructions) 
   clopidogrel  75 mg Oral Daily 
   furosemide  20 mg Oral Daily 
   insulin glargine  20 Units Subcutaneous Nightly 
   insulin lispro (human)  0-10 Units Subcutaneous TID AC 
   insulin lispro (human)  0-5 Units Subcutaneous Nightly 
   isosorbide mononitrate  60 mg Oral Daily 
   losartan  100 mg Oral Daily 
   nortriptyline  25 mg Oral Daily 
   nortriptyline  50 mg Oral Nightly 
   oxybutynin  2.5 mg Oral BID 
   pantoprazole  40 mg Oral QAM AC 
   rOPINIRole  0.75 mg Oral Nightly 
   vancomycin  750 mg Intravenous See Admin Instructions 
 
 Continuous Infusions 
   dextrose   
 
 PRN Medications
 acetaminophen **OR** acetaminophen, albumin human, albuterol, bisacodyl, dextrose, dextrose
, dextrose, glucagon, glucagon, HYDROcodone-acetaminophen **OR** HYDROcodone-acetaminophen, 
loperamide, LORazepam, nitroGLYCERIN, ondansetron **OR** ondansetron, polyethylene glycol, p
rochlorperazine, saline lock IV - when tolerating PO fluids **AND** sodium chloride (PF), so
dium chloride
 
 Allergy:
 Allergies 
 Allergen Reactions 
   Quinapril Hcl Anaphylaxis 
   Digoxin And Related Other (See Comments) 
 
   toxic 
   Metaxalone Other (See Comments) 
   Morphine Mental Changes 
   Make her crazy/ "funny feeling in my head"
 Has used hydromorphone in-house 
   Pantoprazole Sodium Nausea and Vomiting 
   Penicillins Rash 
   Quinapril Hcl Edema 
   Facial Edema 
   Sudafed [Pseudoephedrine Hcl] Rash 
 
 OBJECTIVE
 Vital Signs:
 /61 (BP Location: Right upper arm)  | Pulse 80  | Temp 98.7 F (37.1 C) (Oral)  | 
Resp 16  | Ht 1.778 m (5' 10")  | Wt 68.7 kg (151 lb 7.3 oz)  | SpO2 99%  | BMI 21.73 kg/m
 
 
 I&O Detailed Table: I/O last 3 completed shifts:
 In: 1600 [P.O.:600; Other:1000]
 Out: 2850 [Urine:850; Other:2000]
 Weight change: 
 
 Examination:
 APPEARANCE: The patient is lying in no apparent distress, awake and alert
 VITALS:     Reviewed as listed. 
 EYES:        Non-icteric sclera.  
 LUNGS:      Clear to auscultation bilaterally, no respiratory distress. 
 HEART:      S1, S2, no pericardial rub noted.
 ABDOMEN:    Full, soft, no tenderness.   
 EXTREMITIES: no pedal edema noted.  LLE dressed 
 NEUROLOGIC: No gross focal motor deficit noted,  no Asterixis.  
 PSYCH: The patient is alert and oriented x 3, mood and affect looks ok
 
 L ARM AVF +ve thrill/ bruit
 
 LABS:
 Recent Results (from the past 24 hour(s)) 
 POCT glucose 
  Collection Time: 19 11:00 AM 
 Result Value Ref Range 
  GLUCOSE,POC SCREEN 84 65 - 99 mg/dL 
 POCT glucose 
  Collection Time: 19  4:09 PM 
 Result Value Ref Range 
  GLUCOSE,POC SCREEN 192 (H) 65 - 99 mg/dL 
 POCT glucose 
  Collection Time: 19  8:58 PM 
 Result Value Ref Range 
  GLUCOSE,POC SCREEN 122 (H) 65 - 99 mg/dL 
 CBC W/Auto Diff (Reflex to Manual) 
  Collection Time: 19  4:58 AM 
 Result Value Ref Range 
  WBC 5.21 3.80 - 11.00 K/uL 
  RBC 2.54 (L) 3.70 - 5.10 M/uL 
  HGB 8.7 (L) 11.3 - 15.5 g/dL 
  HCT 26.3 (L) 34.0 - 46.0 % 
  .9 (H) 80.0 - 100.0 fl 
  MCH 34.4 (H) 27.0 - 34.0 pg 
  MCHC 33.1 32.0 - 35.5 g/dL 
  RDW SD 61.7 (H) 37 - 53 fl 
 
   150 - 400 K/uL 
  MPV 9.3 fl 
  DIFF TYPE AUTOMATED  
  NEUTROPHILS 69.68 % 
  LYMPHOCYTES 19.86 % 
  MONOCYTES 9.79 % 
  EOSINOPHILS 0.00 % 
  BASOPHILS 0.67 % 
  NEUTROPHILS ABS 3.63 1.90 - 7.40 K/uL 
  LYMPHOCYTES ABS 1.03 1.00 - 3.90 K/uL 
  MONOCYTES ABS 0.51 0.00 - 0.80 K/uL 
  EOSINOPHILS ABS 0.00 0.00 - 0.50 K/uL 
  BASOPHILS ABS 0.04 0.00 - 0.10 K/uL 
 Comprehensive metabolic panel 
  Collection Time: 19  4:58 AM 
 Result Value Ref Range 
  SODIUM 137 135 - 145 mmol/L 
  POTASSIUM 4.5 3.5 - 4.9 mmol/L 
  CHLORIDE 99 99 - 109 mmol/L 
  CO2 31 23 - 32 mmol/L 
  ANION GAP AGAP 12 5 - 20 mmol/L 
  GLUCOSE 103 (H) 65 - 99 mg/dL 
  BUN 13 8 - 25 mg/dL 
  CREATININE 3.5 (H) 0.50 - 1.00 mg/dL 
  BUN/CREAT 4  
  CALCIUM 9.0 8.5 - 10.5 mg/dL 
  TOTAL PROTEIN 6.3 6.3 - 8.2 g/dL 
  Albumin 2.6 (L) 3.3 - 4.8 g/dL 
  GLOBULIN 3.7 1.3 - 4.9 g/dL 
  A/G 0.7 (L) 1.0 - 2.4 
  TBIL 0.4 0.1 - 1.5 mg/dL 
  ALK PHOS 85 35 - 115 U/L 
  AST 23 10 - 45 U/L 
  ALT 17 10 - 65 U/L 
  EGFR 13 (L) >60 mL/min/1.73m2 
 POCT glucose 
  Collection Time: 19  5:39 AM 
 Result Value Ref Range 
  GLUCOSE,POC SCREEN 110 (H) 65 - 99 mg/dL 
 
 Imaging
 Ct Head Without Contrast
 
 Result Date: 2018
 CHERIE CHIU DEYSI 1950 68 years Female CT HEAD WO CONTRAST 2018 5:23 PM INDICA
TION: Altered mental status in a patient with acute osteomyelitis COMPARISON: Head CT, 2017 TECHNIQUE: CT scan of the head without contrast.  5-mm axial noncontrast images were ac
quired from the foramen magnum through the cranial vertex. Multiplanar reformations were obt
ained from the acquisition data. At least one of the following CT dose optimization techniqu
es were used: Automated exposure control; Adjustment of mA and/or kV according to patient si
ze; Use of iterative reconstruction technique. FINDINGS: Brain: No intracranial hemorrhage, 
midline shift or pathologic mass effect. No cerebral edema, mass lesion or evidence of acute
 infarct. Ventricles and extra-axial fluid spaces: Normal. Paranasal sinuses and mastoid air
 cells: Normal. Calvarium and extracranial soft tissues: Normal. Orbits: The patient is stat
us post bilateral cataract surgery. 
 
 1.  No acute intracranial pathology. Electronically signed by Steven Samano MD on  5:28 PM
 
 X-ray Chest 1 View
 
 
 Result Date: 2019
 CHERIE LEBLANCEN XR CHEST 1 VIEW 2019 4:43 PM HISTORY: 68 years.  Female.  Chest pa
in. TECHNIQUE: 1 view of the chest obtained at 1637 hours. COMPARISON: 2018. FINDINGS:
 A single lead ICD is seen over the right chest with an intact lead in proper position uncha
nged from the previous exam.  The sternotomy wires from prior CABG are noted.  An overlying 
EKG leads is seen.  A presumed loculated pleural fluid collection is seen overlying the left
 heart border similar to the previous exam.  This measures 3.0 x 5.2 cm in the transverse an
d craniocaudal dimensions.  Hazy opacity is seen in the lower right chest which may indicate
 the presence of a right-sided pleural effusion which is layering posteriorly.  No acute air
space disease, parenchymal nodule, mass or pneumothorax is noted.  Apical pleural thickening
 is seen.  The osseous structures are intact. 
 
 1.  Small loculated pleural fluid collection seen overlying the lateral left heart border i
n the lower left chest.  There may also be a small pleural effusion at the right lung base w
hich is layering posteriorly. 2.  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 Electronically s
igned by Eugenio Hoffman DO on 2019 4:59 PM
 
 PROBLEM LIST
 
 Principal Problem:
   Osteomyelitis of left foot (HCC)
 Active Problems:
   Secondary hyperparathyroidism (HCC)
   Depression
   ESRD (end stage renal disease) (HCC)
   Type 2 diabetes mellitus with chronic kidney disease on chronic dialysis, with long-term 
current use of insulin (HCC)
   Anemia in ESRD (end-stage renal disease) (HCC)
   Hypertension, essential
   Diabetic foot ulcer (HCC)
   Chronic systolic congestive heart failure (HCC)
   Chronic anticoagulation
   Cardiomyopathy (HCC)
   Paroxysmal atrial fibrillation (HCC)
   S/P CABG x 2
   S/P mitral valve repair
   PVD (peripheral vascular disease) (HCC)
   CAD (coronary artery disease)
  
 
 ASSESSMENT & PLAN
 
 ESRD ON HD
 No need for hd/uf today
 Assess daily for need for hd & uf 
 hd/ uf in am
 Orders in the chart
 Fluid restriction 1.2 litres/24 hours
 Strict I & O
 Daily RFP
 Renal diet
 Daily weights will help with subsequent hd with uf
 Dose all meds per protocol for ESRD on hd
 
 ANEMIA In esrd   stable
 EPOGEN TO KEEP HGB 10-11
 Lab Results 
 Component Value Date 
  HGB 8.7 (L) 2019 
 
  HGB 8.4 (L) 2019 
  HGB 8.7 (L) 2019 
  FERRITIN 722 (H) 2017 
  FERRITIN 793 (H) 2016 
  FERRITIN 883 (H) 2016 
  LABIRON 28 2017 
  LABIRON 17 2016 
  LABIRON 31 2016 
 
 HYPERTENSION  Controlled 
 WATCH BP CLOSELY DURING HOSPITALIZATION 
 LOW NA DIET
 Will adjust BP meds according to BP readings
 BP Readings from Last 3 Encounters: 
 19 127/61 
 18 139/63 
 18 111/61 
 
 DM-2 
 Optimum Glycemic Control 
 
 HYPERPHOSPHATEMIA  Improving 
 LOW P DIET 
 PO4 BINDERS  Ca acetate with meals
 Lab Results 
 Component Value Date 
  PHOS 4.9 (H) 2018 
  PHOS 5.3 (H) 2018 
  PHOS 5.3 (H) 2018 
 
 HYPOALBUMINEMIA
 INCREASE PROTEIN INTAKE
 Lab Results 
 Component Value Date 
  ALB 2.6 (L) 2019 
  ALB 2.4 (L) 2019 
  ALB 2.5 (L) 2019 
 
 s/p L foot TMA by Dr Jordan 18
 
 CASE DISCUSSED IN DETAIL WITH PATIENT/ care team, ANSWERS ALL QUESTIONS IN DETAIL, VERBALIZ
ES UNDERSTANDING
 
 NEGRITO TRISTIN METZ MD
 2019
 
 Ivy Couch
 
 Seen earlier  and charting completed later  
 Dictation software, Dragon, used which may contain error for similar sounding words even af
ter review. Personal communication requested for any clarification. 
 Portions of my notes may have been carried over for continuity of care.
 
 Andrés Elliott MD - 2019  9:18 AM PSTFormatting of this note may be different from the 
original.
 
 
 CONSULT NOTE
 2019
 9:18 AM
 
 
 PRIMARY PHYSICIAN
 Ivy Couch
 
 CONSULT REQUEST FROM
 Winston Vincent DO
 
 HISTORY OF PRESENT ILLNESS
 The patient is a 68 y.o.-year-old female  with history of ESRD on HD via fistula Tue/Thu/Sa
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
 CABG. Recent LLE angioplasty few weeks ago. She had been on  antibiotics for L foot first d
igit infection with possible osteomyelitis since November (Oral levofloxacin and clindamycin
 then transitioned to IV cefepime with HD until 18, no improvement noted on antibiotics
). She had followed with podiatrist and underwent L TMA on 18, intraoperative findings
 encountered purulence at first metatarsal. 
 Admitted on 18 to [facility]. 
 Wound culture shows skin marzena.
 DAy 8 of iv vancomycin and  Day 4 of ceftazidime
 Had 4 days of cefepime 
 Will complete to iv vancmycin 500 mg iv and ceftazidime 2 gm iv after each hemodialysis uni
tluntil 18 for the infected foot.  
 The patient has problem with tremors on the hands  And on and off confusion. 
 Ct head negative on 18
 EEG done show no seizure just diffuse slowing .
 The patient has some nausea and on and off chest pain stable.
 The patient felt better for possible discharge today    
 Past Medical History 
 Diagnosis Date 
   Acute MI ([facility])  
  with stenting x 1 
   Anemia associated with chronic renal failure 2016 
   Atrial fibrillation ([facility])  
   Chronic kidney disease  
   Congestive heart failure ([facility])  
   COPD (chronic obstructive pulmonary disease) ([facility])  
   COPD (chronic obstructive pulmonary disease) ([facility]) 2018 
   Coronary artery disease  
  stent placements: 3 total 
   Depression  
   Diabetes other 
  abstracted 
   Diabetes mellitus, type 2 ([facility])  
   ESRD on peritoneal dialysis ([facility])  
   GERD (gastroesophageal reflux disease)  
   Hemodialysis patient ([facility]) 10/2016 
  Stopped peritoneal and restarted hemodialysis 
   Hemorrhage of gastrointestinal tract, unspecified 2017 
  low GI, rectal bleeding 
   High blood pressure other 
  abstracted 
   High cholesterol other 
  abstracted 
   Hyperlipidemia  
   Hypertension  
   Hyponatremia 2017 
   Myocardial infarction ([facility]) 2017 
   Other chronic pain  
  feet,  
   Pain of left hip joint 2016 
  due to hip fracture and replacement 
 
   Partial small bowel obstruction ([facility]) 2017 
  resolved 
   Renal failure  
  Stage 5.   Fistula in the JAN - not on dialysis yet. 
   Unspecified visual disturbance  
  glasses 
 
 Past Surgical History 
 Procedure Laterality Date 
   AV FISTULA PLACEMENT   
  left upper arm AV fistula - failed 
   AV FISTULA REPAIR N/A 10/25/2016 
  Procedure: AV FISTULA - GRAFT REPAIR/REVISION;  Surgeon: Kirt Mclaughlin MD;  Location: [facility]
IN OR;  Service: Vascular;  Laterality: N/A;  Superficialization and revision of left arm fi
stula 
   CARDIAC CATHETERIZATION  2017 
   CARPAL TUNNEL RELEASE Bilateral other 
  abstracted 
   CATARACT EXTRACTION Bilateral 2007 
   CATHETER REMOVAL N/A 2016 
  Procedure: DIALYSIS CATHETER - REMOVAL;  Surgeon: Kirt Mclaughlin MD;  Location: [facility] OR; 
 Service: Vascular;  Laterality: N/A;  PD cath removal 
   CHOLECYSTECTOMY  other 
  abstracted 
   COLONOSCOPY   
   CORONARY ARTERY BYPASS GRAFT   
   CORONARY STENT PLACEMENT  2017 
  Drug Elutin stents to OM, 1 stent to prox. LAD 
   EYE SURGERY   
   FOOT AMPUTATION Left 2018 
  Procedure: FOREFOOT - AMPUTATION;  Surgeon: Srinivasan Jordan DPM;  Location: [facility] MAIN OR;
  Service: Podiatry;  Laterality: Left; 
   HARDWARE PRESENT   
  stent placements x 3, Left hip replacement, vascular stents 2 in left leg 
   HIP ARTHROPLASTY Left 2016 
  Procedure: HIP - ARTHROPLASTY(ALLISON);  Surgeon: Uriel Roa MD;  Location: [facility] MAIN 
OR;  Service: Orthopedics;  Laterality: Left; 
   HYSTERECTOMY  1998 
  complete 
   PACEMAKER INSERTION   
  [employer] pacemaker/defib 
   PERITONEAL CATHETER INSERTION  8/15/2013 
   PERITONEAL CATHETER INSERTION N/A 2016 
  Procedure: LAPAROSCOPIC - PERITONEAL DIALYSIS CATH INSERTION;  Surgeon: Kirt Mclaughlin MD;  Lo
cation: [facility] MAIN OR;  Service: Vascular;  Laterality: N/A;  Removal of old catheter 
   SHOULDER SURGERY Right  
  frozen shoulder 
   UNLISTED PROCEDURE ARTHROSCOPY   
  total Left hip 
   vascular stents Left 2017 
  leg (2 stents) 
   VASCULAR SURGERY   
 
 Current Facility-Administered Medications 
 Medication Dose Route Frequency Provider Last Rate Last Dose 
   acetaminophen (TYLENOL) tablet 650 mg  650 mg Oral Q6H PRN Srinivasan Jordan DPM     
  Or 
   acetaminophen (TYLENOL) suppository 650 mg  650 mg Rectal Q6H PRN Srinivasan Jordan DPM 
    
   albumin human 25 % solution 12.5 g  12.5 g Intravenous Q1H PRN Michelle Awad MD   12.5
 
 g at 19 1545 
   albuterol (PROVENTIL HFA;VENTOLIN HFA) inhaler  2 puff Inhalation Q4H PRN Srinivasan Dhillon
anREBEKAH     
   apixaban (ELIQUIS) tablet 2.5 mg  2.5 mg Oral BID Srinivasan Jordan DPM   2.5 mg at  0811 
   aspirin EC tablet 81 mg  81 mg Oral Daily with breakfast Srinivasan Jordan DPM   81 mg a
t 19 0811 
   atorvastatin (LIPITOR) tablet 40 mg  40 mg Oral Nightly Margarita Luz, DO   40 mg at  2110 
   bisacodyl (DULCOLAX) suppository 10 mg  10 mg Rectal Daily PRN Winston Vincent DO     
   calcium acetate (PHOSLO) capsule 667 mg  667 mg Oral TID  Srinivasan Jordan DPM   667 
mg at 19 0811 
   carvedilol (COREG) tablet 3.125 mg  3.125 mg Oral BID  Srinivasan Jordan DPM   3.125 m
g at 19 0811 
   cefTAZidime (FORTAZ) 2 g in sodium chloride (IV) 0.9 % 50 mL IVPB  2 g Intravenous Afte
r Hemodialysis (see admin instructions) Andrés Elliott MD   2 g at 19 203 
   clopidogrel (PLAVIX) tablet 75 mg  75 mg Oral Daily Srinivasan Jordan DPM   75 mg at  0811 
   dextrose 10 % infusion   Intravenous Continuous PRN Srinivasan Jordan DPM     
   dextrose 50 % solution 12 mL  12 mL Intravenous PRN Srinivasan Jordan DPM     
   dextrose 50 % solution 25 mL  25 mL Intravenous PRN Srinivasan Jordan DPM     
   furosemide (LASIX) tablet 20 mg  20 mg Oral Daily Srinivasan Jordan DPM   20 mg at  0811 
   glucagon (GLUCAGEN) injection 0.5 mg  0.5 mg Intramuscular PRN Srinivasan Jordan DPM    
 
   glucagon (GLUCAGEN) injection 1 mg  1 mg Intramuscular PRN Srinivasan Jordan DPM     
   HYDROcodone-acetaminophen (NORCO) 5-325 MG per tablet 1 tablet  1 tablet Oral Q4H PRN JESSICA Jordan DPM   1 tablet at 19 0812 
  Or 
   HYDROcodone-acetaminophen (NORCO)  MG per tablet 1 tablet  1 tablet Oral Q4H PRN 
Srinivasan Jordan DPM   1 tablet at 19 1050 
   insulin glargine (LANTUS) injection 20 Units  20 Units Subcutaneous Nightly Augustin Bourne MD   20 Units at 19 2129 
   insulin lispro (human) (HUMALOG) injection 0-10 Units  0-10 Units Subcutaneous TID AC B
axel Jordan DPM   2 Units at 19 1610 
   insulin lispro (human) (HUMALOG) injection 0-5 Units  0-5 Units Subcutaneous Nightly Br
mario Jordan DPM   1 Units at 19 2144 
   isosorbide mononitrate (IMDUR) 24 hr tablet 60 mg  60 mg Oral Daily GELACIO Terry   60 mg at 19 0811 
   loperamide (IMODIUM) capsule 2 mg  2 mg Oral PRN Srinivasan Jordan DPM     
   LORazepam (ATIVAN) tablet 1 mg  1 mg Oral Daily PRN Srinivasan Jordan DPM   1 mg at 010
3/19 1625 
   losartan (COZAAR) tablet 100 mg  100 mg Oral Daily Srinivasan Jordan DPM   100 mg at  08 
   nitroGLYCERIN (NITROSTAT) SL tablet 0.4 mg  0.4 mg Sublingual Q5 Min PRN Srinivasan anvas, DPM   0.4 mg at 19 0528 
   nortriptyline (PAMELOR) capsule 25 mg  25 mg Oral Daily Srinivasan Jordan DPM   25 mg at
 19 0811 
   nortriptyline (PAMELOR) capsule 50 mg  50 mg Oral Nightly Michelle Awad MD   50 mg at 
19 211 
   ondansetron (ZOFRAN-ODT) disintegrating tablet 4 mg  4 mg Oral Q6H PRN Srinivasan Jordan
 DPM   4 mg at 19 0839 
  Or 
   ondansetron (ZOFRAN) injection 4 mg  4 mg Intravenous Q6H PRN Srinivasan Jordan DPM   4 
mg at 19 2257 
   oxybutynin (DITROPAN) tablet 2.5 mg  2.5 mg Oral BID Srinivasan Jordan DPM   2.5 mg at 0
19 0811 
   pantoprazole (PROTONIX) EC tablet 40 mg  40 mg Oral QAM AC Srinivasan Jordan DPM   40 mg
 at 19 0541 
   polyethylene glycol (GLYCOLAX) packet 17 g  17 g Oral Daily PRN Srinivasan Jordan DPM   
 
17 g at 19 0837 
   prochlorperazine tablet 5 mg  5 mg Oral BID PRN Srinivasan Jordan DPM   5 mg at 19
 1135 
   rOPINIRole (REQUIP) tablet 0.75 mg  0.75 mg Oral Nightly Srinivasan Jordan DPM   0.75 mg
 at 19 211 
   sodium chloride (PF) 0.9 % flush 10 mL  10 mL Intravenous Q8H PRN Srinivasan Jordan DPM 
  10 mL at 18 0747 
   sodium chloride 0.9 % bolus 100 mL  100 mL Intravenous Q5 Min PRN Michelle Awad MD    
 
   vancomycin (VANCOCIN) 750 mg/250 mL IVPB  750 mg Intravenous See Admin Instructions Rene Rosado, RPH   750 mg at 19 
 
 Allergies 
 Allergen Reactions 
   Quinapril Hcl Anaphylaxis 
   Digoxin And Related Other (See Comments) 
   toxic 
   Metaxalone Other (See Comments) 
   Morphine Mental Changes 
   Make her crazy/ "funny feeling in my head"
 Has used hydromorphone in-house 
   Pantoprazole Sodium Nausea and Vomiting 
   Penicillins Rash 
   Quinapril Hcl Edema 
   Facial Edema 
   Sudafed [Pseudoephedrine Hcl] Rash 
 
 Social History 
 
 Social History 
   Marital status:  
   Spouse name: N/A 
   Number of children: N/A 
   Years of education: N/A 
 
 Occupational History 
   Not on file. 
 
 Social History Main Topics 
   Smoking status: Former Smoker 
   Packs/day: 1.00 
   Years: 30.00 
   Types: Cigarettes 
   Quit date: 2004 
   Smokeless tobacco: Never Used 
    Comment: quite smoking  
   Alcohol use No 
   Drug use: No 
   Sexual activity: No 
 
 Other Topics Concern 
   Not on file 
 
 Social History Narrative 
  She lives with . 2 kids. Worked in banking 
 
 No smoking. No alcohol intake. Recreational Drug Use
 NO Travel
 mp Pets
 Immunization History 
 
 Administered Date(s) Administered 
   Hepatitis B Adult 2016 
   Influenza, Trivalent W/Preservative 2016 
   Pneumococcal Polysaccharide 23-valent 2012 
  
 
 Pneumococcal
 Influenza
 
 Family History 
 Problem Relation Age of Onset 
   High cholesterol Father  
   Hypertension Father  
   Diabetes Paternal Grandmother  
   Malig hypertherm Neg Hx  
   Kidney disease Neg Hx  
 
 REVIEW OF SYSTEMS
 
 General: The patient noted to have no  problem of fever,no  weight loss
  and no chills.
 
 Neurologic: Denies any headache, any lightheadedness. No loss of
 
 consciousness or seizure.
 
 Cardiac: with  No Chest Pain,with no  Palpitation,no  Dyspnea on Exertion
 
 Pulmonary: Denies cough, wheezing and hemoptysis
 
 GASTROINTESTINAL: with  nausea no vomiting, and diarrhea.
 GENITOURINARY: Noted no dysuria, urgency, or frequency.
 Hematological : Denies any ecchymosis, bleeding or hematuria
 
 Endocrine: Denies any polyphagia, polyuria or hot and cold intolerance
  Musculoskeletal: no new joint pain and swelling. 
 Skin : Denies any new rashes or cutaneous changes.
 
 Rest of the review of systems is unremarkable.
 
 PHYSICAL EXAMINATION
 
 VITAL SIGNS 
 Wt Readings from Last 3 Encounters: 
 18 68.7 kg (151 lb 7.3 oz) 
 18 65.8 kg (145 lb 1 oz) 
 18 65.9 kg (145 lb 4.5 oz) 
 
 Temp Readings from Last 3 Encounters: 
 19 98.7 F (37.1 C) (Oral) 
 18 98.2 F (36.8 C) (Oral) 
 18 97.9 F (36.6 C) (Oral) 
 
 BP Readings from Last 3 Encounters: 
 19 127/61 
 18 139/63 
 18 111/61 
 
 Pulse Readings from Last 3 Encounters: 
 19 80 
 
 18 74 
 18 85 
 
 Head:  Normocephalic atraumatic
 
 HEENT: Pink palpebral conjunctivae. Anicteric sclerae. No
 tonsillopharyngeal congestion.
 
 Neck : Noted no  Cervical Lymphadenopathy
 
 CHEST:Clear Breath Sounds Bilateral . 
 
 HEART: Regular rate and rhythm. 2/6  murmurs no thrills or rubs
 
 ABDOMEN: Soft, flat. No tenderness. No hepatosplenomegaly.
 
 GROIN AREA: Negative  for intertrigo.
 
 EXTREMITIES: upper extremity no lesions.
 Right lower extremities no edema. Left foot with dressing.
  
 
 NEUROLOGIC: No localizing signs.
 
 Skin : NO rash or ecchymosis.
 
 EEG results
 On 18
  
 This wake EEG is abnormal. Diffuse slowing is 
 suggestive of a diffuse encephalopathy of metabolic, degenerative 
 or vascular origin. No epileptiform activity was noted with body 
 jerking. 
 The absence of epileptiform discharge during the EEG recording 
 does not rule out the diagnosis of a seizure disorder. Clinical 
 correlation is recommended. 
 LABORATORY DATA
 
 Lab Results 
 Component Value Date 
  WBC 5.21 2019 
  HGB 8.7 (L) 2019 
  HCT 26.3 (L) 2019 
   2019 
  CHOL 101 2017 
  TRIG 132 2017 
  HDL 34 (L) 2017 
  ALT 17 2019 
  AST 23 2019 
   2019 
  K 4.5 2019 
  CL 99 2019 
  CREATININE 3.5 (H) 2019 
  BUN 13 2019 
  CO2 31 2019 
  TSH 1.14 2017 
  INR 1.0 2018 
  GLUF 103 (H) 2019 
  HGBA1C 7.5 (H) 2018 
 
 
 Hepatic Function Panel:  
 Lab Results 
 Component Value Date 
  PROT 6.3 2019 
  ALB 2.6 (L) 2019 
  BILITOT 0.4 2019 
  BILIDIR 0.1 2017 
  ALP 85 2019 
  AST 23 2019 
  ALT 17 2019 
  
 
 Lipase: 
 Lab Results 
 Component Value Date 
  LIPASE 332 2017 
  
 U/A:  
 Lab Results 
 Component Value Date 
  COLORU YELLOW 2011 
  CLARITYU Slightly Cloudy 10/16/2018 
  MEROPENEM 1.009 2013 
  LEUKOCYTESUR  10/16/2018 
    Comment: 
    small 
  NITRITE Negative 10/16/2018 
  UROBILINOGEN Normal 10/16/2018 
  UPRO  10/16/2018 
    Comment: 
    100 
  UPRO 100 2013 
  PHUR 8 10/16/2018 
  BLOODU Negative 10/16/2018 
  KETONES negative 10/16/2018 
  BILIRUBINUR Negative 10/16/2018 
  GLUCOSEU  10/16/2018 
    Comment: 
    moderate 
  
 DIAGNOSTIC IMAGING
 
 CAT scan 
 
 CT head without contrast [[id number]] Collected: 18 
 Order Status: Completed Updated: 18 1733 
 Narrative:  
 CHERIE VILLALOBOS
 1950
 68 years Female
 CT HEAD WO CONTRAST
 2018 5:23 PM
 
 INDICATION: Altered mental status in a patient with acute osteomyelitis
 
 COMPARISON: Head CT, 2017
 
 TECHNIQUE: CT scan of the head without contrast. 5-mm axial noncontrast images were acqui
red from the foramen magnum through the cranial vertex. Multiplanar reformations were obtain
ed from the acquisition data.
 
 
 At least one of the following CT dose optimization techniques were used: Automated exposure
 control; Adjustment of mA and/or kV according to patient size; Use of iterative reconstruct
ion technique.
 
 FINDINGS:
 
 Brain: No intracranial hemorrhage, midline shift or pathologic mass effect. No cerebral kt
ma, mass lesion or evidence of acute infarct.
 
 Ventricles and extra-axial fluid spaces: Normal.
 
 Paranasal sinuses and mastoid air cells: Normal.
 
 Calvarium and extracranial soft tissues: Normal.
 
 Orbits: The patient is status post bilateral cataract surgery. 
 Impression:  
 1. No acute intracranial pathology. 
 
 Xr Chest 2 View
 
 Result Date: 2018
 1.  Mild diffuse pulmonary edema, similar to the prior exam. 2.  Left perihilar airspace op
acity which may represent edema. Recommend repeat chest x-ray in 6-8 weeks to verify resolut
ion. 3.  Small left pleural effusion and/or pleural scarring, with adjacent atelectasis or c
onsolidation, similar to the prior exam. Small right pleural effusion. 4.  Peripheral opacit
y within the left lower hemithorax, slightly increased which is questionable for a loculated
 pleural fluid, parenchymal opacity, or artifact. Electronically signed by Chau Garcia on 1
2018 5:33 PM
 
 X-ray Foot Left
 
 Result Date: 2018
 Amputation of the left forefoot at the level of the proximal metatarsals. Surgical cutaneou
s staples are present. No radiographic evidence for osteomyelitis. Normal alignment of the h
indfoot. Mild degeneration of the midfoot. Electronically signed by Oleksandr Lemus MD on 2018 10:12 AM
 
 Ct Head Without Contrast
 
 Result Date: 2018
 1.  No acute intracranial pathology. Electronically signed by Steven Samano MD on  5:28 PM
 
 Nm Myocardial Perfusion Spect (stress And Rest)
 
 Result Date: 2018
 1.  Mild to moderate left ventricular dilatation, unchanged between rest and stress. 2.  I 
cannot exclude mid anterior wall infarct. 3.  I see no evidence of ischemia. 4.  Global mode
rate hypokinesis. 5.  LVEF 29% stress, 33% rest. Using the risk stratification system at our
 institution, this examination equates to at least a high risk based on this imaging, arisin
g from the criteria below (1). Note that this should be interfaced with clinical and other d
patrizia, potentially affecting final categorization. American Heart Association and American Col
lege of Cardiology Scientific Statement. Circulation (2008); 118: p [id number] Electronically
 signed by Wilfrido Knight MD on 2018 4:34 PM
 
 Ir Angiogram Extremity Left
 
 Result Date: 2018
 
 1. Aorta with narrow aortic bifurcation with moderate stenosis of proximal left common errol
c artery. 2. Left SFA with moderate stenosis proximally. 3. Single vessel runoff to left jeanne
t via left anterior tibial artery. 4. Left posterior tibial artery and peroneal artery were 
occluded with reconstitution of distal posterior tibial artery at the left ankle via collate
rals. 5. If forefoot amputation does not heal, may need popliteal to posterior tibial artery
 bypass versus antegrade access of left common femoral artery with posterior tibial artery r
ecanalization. Electronically signed by Kirt Mclaughlin MD on 2018 10:51 AM
 
 Cherie Villalobos is a 68 y.o. female with the following Problems 
 Patient Active Problem List 
 Diagnosis 
   Proteinuria 
   Vitamin D deficiency 
   Secondary hyperparathyroidism (HCC) 
   Recurrent UTI 
   Coronary artery disease involving native coronary artery of native heart without angina
 pectoris 
   Depression 
   ESRD (end stage renal disease) (HCC) 
   Type 2 diabetes mellitus with chronic kidney disease on chronic dialysis, with long-ter
m current use of insulin (HCC) 
   Anemia in ESRD (end-stage renal disease) (HCC) 
   Hypertension, essential 
   Dyslipidemia 
   Gastroesophageal reflux disease without esophagitis 
   Diabetic foot ulcer (HCC) 
   Loss of weight 
   Dysphagia, unspecified 
   Foot ulcer due to DM  (HCC) 
   Severe protein-calorie malnutrition (HCC) 
   Osteomyelitis of left foot (HCC) 
   Pleural effusion 
   Prolonged Q-T interval on ECG 
   Chronic systolic congestive heart failure (HCC) 
   Chronic diarrhea 
   Chronic anticoagulation 
   Plantar ulcer (HCC) 
   Cardiomyopathy (HCC) 
   COPD (chronic obstructive pulmonary disease) (HCC) 
   ICD (implantable cardioverter-defibrillator) in place 
   Implantable defibrillator reprogramming/check 
   Mixed hyperlipidemia 
   Moderate protein-calorie malnutrition (HCC) 
   Paroxysmal atrial fibrillation (HCC) 
   S/P CABG x 2 
   S/P mitral valve repair 
   Steroid-induced hyperglycemia 
   Stress hyperglycemia 
   Chronic multifocal osteomyelitis of left foot (HCC) 
   PVD (peripheral vascular disease) (HCC) 
   CAD (coronary artery disease) 
 
 Plan:
 
 CBC CMP ESR CRP  Vancomycin trough 
 q Monday 
 Vancomycin 500 mg iv and ceftazidime 2 gm after each hemodialysis
 Follow up in 2 weeks. 
 RECOMMENDATIONS
 continue with t discharge planning for iv vancomycin and ceftazidime for 6 weeks until   
 Discussed with Dr. Vincent hospitalist  who agreed and will proceed with GI consult and cardio
logy follow up. 
  
 As plan.
 Thank you very much for the consult.
 ______________________
 
 ______________________
 MD Melisa FRANKLIN, Winston, DO - 2019  2:59 PM PSTFormatting of this note may be different from the 
original.
 [facility]
 Service:  Hospitalist
 Progress Note
 
 Pt: Cherie Villalobos  AGE/SEX: 68 y.o.   female      : 1950      
 MRN: [id number]          ROOM: [address] [place] [address]   
 TODAY'S DATE: 1/3/2019
 
 Hospital Day:   LOS: 6 days 
 SUBJECTIVE
 Patient evaluated at bedside. Patient denies significant foot pain in the left foot. Patien
t continues to have episodes of epigastric and left arm pain that resolves with nitroglyceri
n. Cardiology following, recommending GI evaluation. No new complaints otherwise. 
 
 Scheduled Medications 
   apixaban  2.5 mg Oral BID 
   aspirin  81 mg Oral Daily with breakfast 
   atorvastatin  40 mg Oral Nightly 
   calcium acetate  667 mg Oral TID WC 
   carvedilol  3.125 mg Oral BID WC 
   cefTAZidime  2 g Intravenous After Hemodialysis (see admin instructions) 
   clopidogrel  75 mg Oral Daily 
   furosemide  20 mg Oral Daily 
   insulin glargine  20 Units Subcutaneous Nightly 
   insulin lispro (human)  0-10 Units Subcutaneous TID AC 
   insulin lispro (human)  0-5 Units Subcutaneous Nightly 
   isosorbide mononitrate  60 mg Oral Daily 
   losartan  100 mg Oral Daily 
   nortriptyline  25 mg Oral Daily 
   nortriptyline  50 mg Oral Nightly 
   oxybutynin  2.5 mg Oral BID 
   pantoprazole  40 mg Oral QAM AC 
   rOPINIRole  0.75 mg Oral Nightly 
   vancomycin  750 mg Intravenous See Admin Instructions 
 
 Continuous Infusions 
   dextrose   
 
 PRN Medications
 acetaminophen **OR** acetaminophen, albumin human, albuterol, dextrose, dextrose, dextrose,
 glucagon, glucagon, HYDROcodone-acetaminophen **OR** HYDROcodone-acetaminophen, loperamide,
 LORazepam, nitroGLYCERIN, ondansetron **OR** ondansetron, polyethylene glycol, prochlorpera
zine, saline lock IV - when tolerating PO fluids **AND** sodium chloride (PF)
 
 Allergy:
 Allergies 
 Allergen Reactions 
 
   Quinapril Hcl Anaphylaxis 
   Digoxin And Related Other (See Comments) 
   toxic 
   Metaxalone Other (See Comments) 
   Morphine Mental Changes 
   Make her crazy/ "funny feeling in my head"
 Has used hydromorphone in-house 
   Pantoprazole Sodium Nausea and Vomiting 
   Penicillins Rash 
   Quinapril Hcl Edema 
   Facial Edema 
   Sudafed [Pseudoephedrine Hcl] Rash 
 
 OBJECTIVE
 Vitals:
 Patient Vitals for the past 24 hrs:
  BP Temp Temp src Pulse Resp SpO2 
 19 1440 118/56 98.6 F (37 C) Oral 70 16 91 % 
 19 1057 133/63 98.2 F (36.8 C) Oral 74 16 97 % 
 19 0724 124/61 98.1 F (36.7 C) Oral 76 18 90 % 
 19 0314 107/63 97.9 F (36.6 C) Oral 66 16 98 % 
 19 2335 103/53 - - - - - 
 19 2300 99/49 98.1 F (36.7 C) Oral 72 16 99 % 
 19 1947 108/53 - - - - - 
 19 1942 96/46 97.9 F (36.6 C) Oral 67 16 - 
 19 1830 (!) 144/93 - - 82 - (!) 84 % 
 19 1815 121/58 - - 80 - 99 % 
 19 1758 115/56 - - 74 - 99 % 
 19 1739 124/57 - - 80 16 99 % 
 19 1734 130/72 - - 86 16 100 % 
 19 1537 129/60 97.9 F (36.6 C) - 84 16 - 
 19 1530 - - - - 16 - 
 19 1515 130/61 - - 74 16 - 
 19 1500 128/59 - - 70 16 - 
 
 I&O Detailed Table: 
 
 Intake/Output Summary (Last 24 hours) at 19 1459
 Last data filed at 19 1420
  Gross per 24 hour 
 Intake             3411 ml 
 Output             1700 ml 
 Net             1711 ml 
 
 No data found.
 
 Hemodynamics Last 24hrs:   
 
 Examination:
 
 Constitutional: Oriented to person, place, and time. appears well-developed and well-nouris
hed. 
 
 HEENT: 
 Head: Normocephalic and atraumatic. 
 Nose: Nose normal. 
 Mouth/Throat: Oropharynx is clear and moist.
 Eyes: Conjunctivae and EOM are normal. Pupils are equal, round, and reactive to light. Righ
t eye exhibits no discharge. Left eye exhibits no discharge. No scleral icterus. 
 Neck: Normal range of motion. Neck supple. No JVD present. No tracheal deviation present. N
 
o thyromegaly present. no cervical adenopathy.
 
 Cardiovascular: Normal rate, regular rhythm, normal heart sounds with S1 and S2, and intact
 distal pulses.  Exam reveals no gallop and no friction rub.  No murmur heard.
 
 Pulmonary/Chest: Effort normal and breath sounds normal. No stridor. No respiratory distres
s.  no wheezes. no rales. exhibits no tenderness. 
 
 Abdominal: Soft. Bowel sounds are normal. exhibits no distension and no mass. There is no t
enderness. There is no rebound and no guarding. 
 
 Extremities/Musculoskeletal: Normal range of motion.exhibits no tenderness.  exhibits no ed
ema. Left leg bandage in place. 
   
 Neurological:  Alert and oriented to person, place, and time. Has normal reflexes.  display
s normal reflexes. No cranial nerve deficit.  Exhibits normal muscle tone. Coordination norm
al. Exhibits twitching 
 
 Skin: Skin is warm and dry. No rash noted. No erythema. No pallor. 
 
 Psychiatric: Has a normal mood and affect. Behavior is normal. Judgment normal. 
 
 LABS:
 WBC 
 Date Value Ref Range Status 
 2019 5.22 3.80 - 11.00 K/uL Final 
 
 RBC 
 Date Value Ref Range Status 
 2019 2.48 (L) 3.70 - 5.10 M/uL Final 
 
 HGB 
 Date Value Ref Range Status 
 2019 8.4 (L) 11.3 - 15.5 g/dL Final 
 
 HCT 
 Date Value Ref Range Status 
 2019 25.7 (L) 34.0 - 46.0 % Final 
 
 MCV 
 Date Value Ref Range Status 
 2019 103.9 (H) 80.0 - 100.0 fl Final 
 
 MCH 
 Date Value Ref Range Status 
 2019 33.8 27.0 - 34.0 pg Final 
 
 MCHC 
 Date Value Ref Range Status 
 2019 32.5 32.0 - 35.5 g/dL Final 
 
 RDW SD 
 Date Value Ref Range Status 
 2019 59.5 (H) 37 - 53 fl Final 
 
 PLT 
 Date Value Ref Range Status 
 2019 149 (L) 150 - 400 K/uL Final 
 
 MPV 
 
 Date Value Ref Range Status 
 2019 9.0 fl Final 
 
 NEUTROPHILS ABS 
 Date Value Ref Range Status 
 2019 3.78 1.90 - 7.40 K/uL Final 
 
 LYMPHOCYTES ABS 
 Date Value Ref Range Status 
 2019 0.94 (L) 1.00 - 3.90 K/uL Final 
 
 EOSINOPHILS ABS 
 Date Value Ref Range Status 
 2019 0.00 0.00 - 0.50 K/uL Final 
 
 BASOPHILS ABS 
 Date Value Ref Range Status 
 2019 0.03 0.00 - 0.10 K/uL Final 
   Comment: 
   Testing performed at [facility], [address] [place]  [address] 
 
 Neutrophils Manual 
 Date Value Ref Range Status 
 2019 86 % Final 
 
 Lymphocytes Manual 
 Date Value Ref Range Status 
 2019 10 % Final 
 
 Monocytes Manual 
 Date Value Ref Range Status 
 2019 4 % Final 
 
 MORPHOLOGY 
 Date Value Ref Range Status 
 2019 1+  Final 
   Comment: 
   ANISO
 1+
 MACRO
 NORMAL PLT MORPH
 Testing performed at [facility], [address] [place] [address]  [id number]
  
 
 GLUCOSE 
 Date Value Ref Range Status 
 2019 86 65 - 99 mg/dL Final 
 
 BUN 
 Date Value Ref Range Status 
 2019 21 8 - 25 mg/dL Final 
 
 CREATININE 
 Date Value Ref Range Status 
 2019 4.5 (H) 0.50 - 1.00 mg/dL Final 
 
 BUN/CREAT 
 Date Value Ref Range Status 
 2019 5  Final 
 
 
 TOTAL PROTEIN 
 Date Value Ref Range Status 
 2019 5.9 (L) 6.3 - 8.2 g/dL Final 
 
 GLOBULIN 
 Date Value Ref Range Status 
 2019 3.5 1.3 - 4.9 g/dL Final 
 
 TBIL 
 Date Value Ref Range Status 
 2019 0.3 0.1 - 1.5 mg/dL Final 
 
 ALT 
 Date Value Ref Range Status 
 2019 16 10 - 65 U/L Final 
 
 AST 
 Date Value Ref Range Status 
 2019 21 10 - 45 U/L Final 
 
 SODIUM 
 Date Value Ref Range Status 
 2019 138 135 - 145 mmol/L Final 
 
 POTASSIUM 
 Date Value Ref Range Status 
 2019 4.5 3.5 - 4.9 mmol/L Final 
 
 CHLORIDE 
 Date Value Ref Range Status 
 2019 100 99 - 109 mmol/L Final 
 
 CO2 
 Date Value Ref Range Status 
 2019 29 23 - 32 mmol/L Final 
 
 ANION GAP AGAP 
 Date Value Ref Range Status 
 2019 14 5 - 20 mmol/L Final 
 
 [unfilled]
 HDL CHOL 
 Date Value Ref Range Status 
 2017 34 (L) >40 mg/dL Final 
 
 CHOLESTEROL 
 Date Value Ref Range Status 
 2017 101 <200 mg/dL Final 
 
 TSH 
 Date Value Ref Range Status 
 2017 1.14 0.45 - 5.10 uIU/mL Final 
   Comment: 
   Testing performed at [facility];[address];[place] [address] 
 
 TOTAL PROTEIN 
 Date Value Ref Range Status 
 2019 5.9 (L) 6.3 - 8.2 g/dL Final 
 
 TBIL 
 
 Date Value Ref Range Status 
 2019 0.3 0.1 - 1.5 mg/dL Final 
 
 BILI, DIRECT 
 Date Value Ref Range Status 
 2017 0.1 0.0 - 0.3 mg/dL Final 
 
 AST 
 Date Value Ref Range Status 
 2019 21 10 - 45 U/L Final 
 
 ALT 
 Date Value Ref Range Status 
 2019 16 10 - 65 U/L Final 
 
 Diagnostic:
 Xr Chest 2 View
 
 Result Date: 2018
 CHERIE CHIU DEYSI 1950 68 years Female XR CHEST 2 VIEW FRONTAL AND LATERAL 2018
 5:27 PM INDICATION: Chest pain COMPARISON: 2018, CT chest 2017 TECHNIQUE: Two vie
w chest, PA and lateral views FINDINGS: The heart is stable in size. Patient is post median 
sternotomy. Right cardiac ICD in situ. Mild diffuse coarsening of lung markings. Suspect mil
d underlying interstitial edema. 1.6 cm perihilar opacity noted on the left. Blunting of the
 left costophrenic angle which may be secondary to effusion and/or scarring. Small right ple
ural effusion. Peripheral opacity along the lower lateral hemithorax, slightly increased que
stionable for artifact or loculated pleural fluid. Atelectasis or consolidation within the l
eft lung base, similar to the prior exam. 
 
 1.  Mild diffuse pulmonary edema, similar to the prior exam. 2.  Left perihilar airspace op
acity which may represent edema. Recommend repeat chest x-ray in 6-8 weeks to verify resolut
ion. 3.  Small left pleural effusion and/or pleural scarring, with adjacent atelectasis or c
onsolidation, similar to the prior exam. Small right pleural effusion. 4.  Peripheral opacit
y within the left lower hemithorax, slightly increased which is questionable for a loculated
 pleural fluid, parenchymal opacity, or artifact. Electronically signed by Chau Garcia on 1
2018 5:33 PM
 
 X-ray Foot Left
 
 Result Date: 2018
 CHERIE VILLALOBOS 1950 XR FOOT LEFT 2018 9:54 AM INDICATION: Osteomyelitis of 
the left foot. COMPARISON: 2018 TECHNIQUE: Left foot series, 3 views, PA, obliq
ue and lateral views 
 
 Amputation of the left forefoot at the level of the proximal metatarsals. Surgical cutaneou
s staples are present. No radiographic evidence for osteomyelitis. Normal alignment of the h
indfoot. Mild degeneration of the midfoot. Electronically signed by Oleksandr Lemus MD on 2018 10:12 AM
 
 Nm Myocardial Perfusion Spect (stress And Rest)
 
 Result Date: 2018
 HISTORY: Chest pain. Peripheral vascular disease. Multiple coronary stents. Multiple previo
us myocardial infarctions. CABG. TECHNIQUE: The patient was intravenously injected with 10.5
 millicuries technetium 99m sestamibi. Rest gated supine SPECT images were obtained.  The pa
tient was then intravenously injected with 0.4 mg Regadenason per protocol.    The patient w
as finally intravenously injected with 30.1 mCi technetium 99m sestamibi, and stress gated s
upine SPECT, and prone SPECT scintigraphic images were obtained. COMPARISON: Echocardiogram 
18. Patient was injected on 17 for a rest SPECT study, but no images were perfor
med. Coronary arteriography 18. FINDINGS: Moderate left ventricular dilatation, unchan
 
ged between rest and stress. Thinning of the proximal inferior wall radiating minimally into
 the lateral wall. This is unchanged between rest and stress supine images. Stress prone lanre
ges show resolution of this finding, favoring artifact. There is mild thinning of the mid an
terior wall slightly radiating into the septum, unchanged between rest and stress. I cannot 
exclude infarct. LVEF measures 29% stress, 33% rest. Moderate global hypokinesis. Rest EDV 1
70 mL. Rest  mL. Stress  mL. Stress  mL. Transient ischemic dilatation 
ratio 1.06, normal. 
 
 1.  Mild to moderate left ventricular dilatation, unchanged between rest and stress. 2.  I 
cannot exclude mid anterior wall infarct. 3.  I see no evidence of ischemia. 4.  Global mode
rate hypokinesis. 5.  LVEF 29% stress, 33% rest. Using the risk stratification system at our
 institution, this examination equates to at least a high risk based on this imaging, saraywilber cm from the criteria below (1). Note that this should be interfaced with clinical and other d
patrizia, potentially affecting final categorization. American Heart Association and American Col
lege of Cardiology Scientific Statement. Circulation (2008); 118: p 3984-7271 Electronically
 signed by Wilfrido Knight MD on 2018 4:34 PM
 
 Ir Angiogram Extremity Left
 
 Result Date: 2018
 LOWER EXTREMITY ARTERIOGRAM, UNILATERAL PREOPERATIVE DIAGNOSIS:  Critical limb ischemia and
 need for left forefoot amputation POSTOPERATIVE DIAGNOSIS:  Same PROCEDURE: 1. Moderate con
scious sedation 2. Ultrasound guided access of the right common femoral artery 3. Aortogram 
4. Selective left common femoral artery catheterization with left leg runoff 5. Left common 
iliac artery angioplasty using 6 x 40 mm angioplasty balloon 6. Drug eluting balloon angiopl
asty of left SFA using 4 x 100 mm Lutonix balloon SURGEON: Kirt Mclaughlin MD ASSISTANT: Arianna ANE
HESIA: Moderate sedation and local anesthesia Informed consent was obtained from the patient
. Continuous cardiac monitoring was performed throughout the procedure. Conscious sedation w
as provided by the nursing staff during the procedure under my supervision. Sedation time: 5
6 minutes Medications: 1 mg Versed IV, 100 mcg Fentanyl IV ESTIMATED BLOOD LOSS: Minimal CON
TRAST:  55 mL of Isovue 250 INDICATIONS:  See preoperative history and physical FINDINGS:  A
jabier with narrow aortic bifurcation. Moderate stenosis of proximal left common iliac artery 
making passing sheath difficult. Left SFA with moderate stenosis proximally. Single vessel r
unoff seen via left anterior tibial artery to foot. The posterior tibial artery does reconst
itute in left ankle via collaterals. Unable to pass sheath over aortic bifurcation due to il
iac disease and small arteries. After angioplasty, moderate angiographic results. Should be 
able to heal left forefoot amputation. If amputation does not heal, may need popliteal to po
sterior tibial artery bypass versus antegrade access of left common femoral artery with post
erior tibial artery recanalization. DESCRIPTION OF PROCEDURE:  The patient was properly iden
tified and brought to the Cath Lab. The patient was placed supine on the catheter table and 
patient was given moderate sedation by nursing staff under my supervision. Patient's bilater
al groins were then prepped and draped in the usual sterile fashion. Local anesthetic was gi
fidelia to the right groin and the right common femoral artery was accessed under ultrasound oksana
dance using a micropuncture needle. A micropuncture sheath was inserted over a wire and up s
ized to a 4 French sheath. A Omni flush catheter was then inserted into the distal aorta and
 aortogram was then performed with the findings as described above. The Omni Flush catheter 
was then used to guide a Glidewire into the left common femoral artery and then the catheter
 was then advanced over the wire. A left lower extremity runoff was then performed with the 
findings as described above. Next, an Amplatzer wire was then inserted over the catheter and
 the 4 French sheath was removed. A 6 French destination sheath was then inserted over the w
antione with the tip of the sheath being placed into the proximal left common iliac artery. The 
sheath would not pass through the diseased left common iliac artery. Angioplasty of the left
 common iliac artery was then performed using 6 x 40 mm angioplasty balloon. However, the sh
eath with still not pass after angioplasty. A vertebral catheter was inserted over the wire 
and the wire was then removed. A Glidewire was then placed into the vertebral catheter and u
sed to cross through the stenotic left superficial femoral artery.  Next, a 260 fix core wir
e was then inserted over the catheter.  Drug eluting balloon angioplasty of the left SFA was
 then performed using 4 x 100 mm Lutonix balloon. Unable to cross left posterior tibial britt
ry occlusion due to tip is sheath only being in left common iliac artery. A final arteriogra
m was then performed through the sheath. The destination sheath was then removed and a Mynx 
 
closure device was then used on the right common femoral artery and hemostasis was ensured. 
The patient was then taken to the observation unit for further monitoring. 
 
 1. Aorta with narrow aortic bifurcation with moderate stenosis of proximal left common errol
c artery. 2. Left SFA with moderate stenosis proximally. 3. Single vessel runoff to left jeanne
t via left anterior tibial artery. 4. Left posterior tibial artery and peroneal artery were 
occluded with reconstitution of distal posterior tibial artery at the left ankle via collate
rals. 5. If forefoot amputation does not heal, may need popliteal to posterior tibial artery
 bypass versus antegrade access of left common femoral artery with posterior tibial artery r
ecanalization. Electronically signed by Kirt Mclaughlin MD on 2018 10:51 AM
 
 Echo Cardiac Adult With Contrast
 
 Result Date: 2018
 Patient Name:  CHERIE VILLALOBOS YOB: 1950 Accession:  [id number] Performing Phys
ician:   Duglas Ornelas MD _______________________________________________________________ IND
ICATIONS ----------- SHORTNESS OF BREATH, DEFINITY WAS USED/SIGNIFICANT PATIENT REACTION FLA
NK PAIN CONCLUSIONS ----------- 1. Overall left ventricular systolic function is severely im
paired with, an EF between 20 - 25 %. 2. There is mild aortic regurgitation. 3. There is mil
d to  moderate aortic stenosis present. 4. Mild-to-moderate mitral regurgitation is present.
 5. There is moderate pulmonary hypertension. FINDINGS -------- Study: A 2-dimensional trans
thoracic echocardiogram with m-mode, spectral and color flow Doppler was perfomed. Study: Th
is was a technically adequate study. Left Ventricle: Overall left ventricular systolic funct
ion is severely impaired with, an EF between 20 - 25 %. Left Ventricle: The left ventricle c
avity size is normal. Left Ventricle: Left ventricular wall thickness is normal. Right Ventr
icle: The right ventricle is mildly enlarged measuring between 3.4 - 3.7 cm. Right Ventricle
: The right ventricular systolic function is at the low end of normal. Right Ventricle: Pace
r/ICD wire seen. Left Atrium: The left atrial size is normal Left Atrium: , and the LA measu
res 4.6cm. Right Atrium: The right atrium is normal in size. Right Atrium: Pacemaker wire se
en in the right atrial cavity. Aortic Valve: The aortic valve is trileaflet. Aortic Valve: T
he aortic valve is mildly calcified. Aortic Valve: There is mild aortic regurgitation. Aorti
c Valve: There is moderate aortic stenosis present. Mitral Valve: Mild-to-moderate mitral re
gurgitation is present. Mitral Valve: Mild mitral annular calcification present. Tricuspid V
alve: The tricuspid valve appears structurally normal. Tricuspid Valve: Mild tricuspid regur
gitation present. Tricuspid Valve: There is moderate pulmonary hypertension. Tricuspid Valve
: The right ventricular systolic pressure (pulmonary artery systolic pressure), as measured 
by Doppler, is 55.99mmHg. Pulmonic Valve: The pulmonic valve was not well visualized. Pulmon
ic Valve: Mild pulmonic regurgitation. Pericardium: There is no pericardial effusion. IVC/He
patic Veins: The IVC is normal size (1.5-2.5cm) and collapses >50% with sniff, consistent wi
th central venous pressures of 5-10mmHg. Contrast: Poor visualization. Definity was used to 
opacify the left ventricular chamber and improve delineation of the endocardial border. DIANE
UREMENTS ------------- CHEIHK Planimetry:   2.21 cm2 AVAI Planimetry:   0.00 cm2/m2 RA Area:   
13.84 cm2 IVC:   1.61 cm LA Major:   5.27 cm EDV(Teich):   121.12 ml IVSd:   0.82 cm LVIDd: 
  5.05 cm LVPWd:   0.88 cm LVOT Diam:   1.87 cm %FS:   9.90 % EF(Teich):   21.60 % ESV(Teich
):   94.95 ml IVSs:   0.96 cm LVIDs:   4.55 cm LVPWs:   1.05 cm SV(Teich):   26.17 ml RA Adan
or:   4.85 cm RVIDd:   3.62 cm LVEF MOD A2C:   23.68 % SV MOD A2C:   32.38 ml LVEF MOD A4C: 
  21.82 % SV MOD A4C:   27.33 ml EF Biplane:   22.47 % LVEDV MOD BP:   131.72 ml LVESV MOD B
P:   102.11 ml LVEDV MOD A2C:   136.73 ml LVLd A2C:   8.73 cm LVEDV MOD A4C:   125.25 ml LVL
d A4C:   8.87 cm LVESV MOD A2C:   104.34 ml LVLs A2C:   8.34 cm LVESV MOD A4C:   97.91 ml LV
Ls A4C:   8.60 cm LAESV(A-L):   48.15 ml LAESV Index (A-L):   26.17 ml/m2 LAAs A2C:   16.24 
cm2 LAESV A-L A2C:   47.34 ml LALs A2C:   4.73 cm LAAs A4C:   16.52 cm2 LAESV A-L A4C:   47.
99 ml LALs A4C:   4.83 cm Ao Diam:   2.59 cm AV Cusp:   1.65 cm LA Diam:   4.60 cm LA/Ao:   
1.77 TAPSE:   1.57 cm IVC diameter:   1.61 cm IVC collapse:   0.68 cm IVC % collapse:   56.1
6 % AR Dec Lipscomb:   3.95 m/s2 AR Dec Time:   955.07 ms AR maxP.06 mmHg AR PHT:   276.
97 ms AR Vmax:   3.77 m/s HR:   68.91 BPM AV maxP.56 mmHg AV meanPG:   10.23 mmHg AV 
Vmax:   2.21 m/s AV Vmean:   1.53 m/s AV VTI:   47.77 cm CHEIKH Vmax:   1.03 cm2 CHEIKH (VTI):   0
.99 cm2 AVAI Vmax:   0.00 cm2/m2 AVAI (VTI):   0.00 cm2/m2 LVCI Dopp:   1.80 l/minm2 LVCO Do
pp:   3.32 l/min HR:   70.30 BPM LVOT maxP.71 mmHg LVOT meanP.55 mmHg LVSI Dopp:
   25.70 ml/m2 LVSV Dopp:   47.30 ml LVOT Vmax:   0.82 m/s LVOT Vmean:   0.58 m/s LVOT VTI: 
  17.07 cm MCO:   459.56 ms MV A Hiram:   0.83 m/s MV DecT:   232.85 ms MV E Hiram:   1.70 m/s M
 
V E/A Ratio:   2.05 MV PHT:   80.47 ms MVA By PHT:   2.73 cm2 MV A Dur:   82.77 ms MV maxP.14 mmHg MV meanP.79 mmHg MV Vmax:   1.74 m/s MV Vmean:   1.01 m/s MV VTI:   45.3
3 cm MVA (VTI):   1.04 cm2 Septal e':   0.04 m/s Septal E/e':   38.01 Lateral e':   0.08 m/s
 Lateral E/e':   21.28 P Vein D:   0.84 m/s P Vein S/D Ratio:   0.56 P Vein S:   0.47 m/s PA
EDP:   8.38 mmHg PRend P.38 mmHg PRend Vmax:   1.16 m/s HR:   69.37 BPM PV maxP.
87 mmHg PV meanP.11 mmHg PV Vmax:   0.68 m/s PV Vmean:   0.50 m/s PV VTI:   15.15 cm R
AP:   3 mmHg RV S':   0.08 m/s RVSP:   55.99 mmHg TR maxP.99 mmHg TR Vmax:   3.63 m/s
 TV A Hiram:   0.44 m/s TV Dec Lipscomb:   6.11 m/s2 TV Dec Time:   133.74 ms TV E Hiram:   0.81 m/
s TV E/A Ratio:   1.81 Sonographer: CEE Authenticated by: Duglas Ornelas MD Report Date/Time: 
2018 17:39:35 
 
 1. Overall left ventricular systolic function is severely impaired with, an EF between 20 -
 25 %. 2. There is mild aortic regurgitation. 3. There is mild to  moderate aortic stenosis 
present. 4. Mild-to-moderate mitral regurgitation is present. 5. There is moderate pulmonary
 hypertension.
 
 Ir Guidance Vascular Access Us
 
 Result Date: 2018
 This Point of Care (POC) ultrasound image has been reviewed and interpreted by the physicia
n identified as the performing physician in the associated interpretation and report. 
 
 PROBLEM LIST
 Principal Problem:
   Osteomyelitis of left foot (HCC)
 Active Problems:
   Secondary hyperparathyroidism (HCC)
   Depression
   ESRD (end stage renal disease) (HCC)
   Type 2 diabetes mellitus with chronic kidney disease on chronic dialysis, with long-term 
current use of insulin (HCC)
   Anemia in ESRD (end-stage renal disease) (HCC)
   Hypertension, essential
   Diabetic foot ulcer (HCC)
   Chronic systolic congestive heart failure (HCC)
   Chronic anticoagulation
   Cardiomyopathy (HCC)
   Paroxysmal atrial fibrillation (HCC)
   S/P CABG x 2
   S/P mitral valve repair
   PVD (peripheral vascular disease) (HCC)
   CAD (coronary artery disease)
 
 ASSESSMENT & PLAN
 
 Osteomyelitis of left foot SP left TMA by Dr. Jordan on 18. Bone culture grew normal
 skin marzena. However bone pathology is pending. Patient is receiving cefepime with each HD p
er nephrology and ID recommendations. 
 - Dr. Jordan is following patient in the hospital. 
 - ID consult from Dr. Elliott appreciated. Will follow recommendations regarding use of future
 antibiotic.  
 - Continue vanc and ceftazidime 
 
 Phantom pain.Resolved.
 
 Twitching with AMS - currently resolved/improved
 
 ESRD. On HD every T-T-S. Last HD yesterday. Appreciate help from nephrology services. 
 - He to get dialysis today
 
 
 Type II DM with complications. Most recent blood sugars have been in acceptable range . She
 is on Lantus and SSI. Last glycohemoglobin A1c was 7.5 on 18. Will continue current r
egimen of Lantus and Humalog. 
 
 CAD SP CABG, paroxysmal atrial fibrillation, HFrEF. patient had elevation in troponin assoc
iated with chest pain yesterday. Discussed case with cardiology, they recommended medical ma
nagkaren at this time.
 Head lengthy discussion with patient this morning about severity of her cardiac disease.
 -  Will continue apixaban, carvedilol, aspirin, Plavix, losartan and Imdur. 
 - Sublingual nitroglycerin prn
 
 History of PAD. SP left leg angioplasty. On Plavix and aspirin. 
 
 Anemia of chronic kidney disease. Management per nephrology services. 
 
 Disposition: [facility] eval, will likely go tomorrow, on maximal therapy. 
 
 Winston Vincent DO
 1/3/2019 2:59 PM
 
 Michelle Awad MD - 2019  2:52 PM PSTFormatting of this note may be different from caro rodas original.
 [id number]/428-1  
    LOS: 6 days 
 She was admitted with plan for left TMA with Dr. Jordan which she had done 18.
 She ate today and did indicate some discomfort and food catching in her upper abdomen witho
ut any vomiting. She did indicate nausea. She has no diarrhea/constipation. She has no chest
 pain. She remains non ambulatory.
 
 PMH, PSH, Social history reviewed in chart. Allergies and medications reviewed. Previous hi
story summarized as above. Prior lab data and imaging reviewed.Patient's old records and lab
s were reviewed in detail and summarized.
 
 ROS:
 Review of systems is as per history of present illness. Otherwise a 14 organ system review 
of systems was done and is negative.
 
 Examination:
 
 APPEARANCE: She is is alert, awake, sitting up in bed in no distress. 
 VITALS: Reviewed as listed.
 HEAD: NC/AT. 
 EYES: EOMI. Non-icteric sclera.
 NECK: No JVD. 
 LUNGS: Effort fair. CTA. 
 HEART: S1 soft, no pericardial rub noted. Systolic murmur at apex with radiation to back.
 ABDOMEN: Soft with minimal distention, no tenderness. No organomegaly or bruits noted. Benoit
l sounds diminished.
 EXTREMITIES: No LE edema. No cyanosis or clubbing. Peripheral pulses not palpable. Left jeanne
t s/p TMA with bandage.
 SKIN: Warm to touch. No rash.
 NEUROLOGIC: Alert, awake, oriented, speech fluent. Gait not tested. 
 PSYCH: pleasant demeanor..
 BACK: No CVA tenderness noted. There is no sacral edema.
 
 Dialysis access
 Left brachiocephalic AV fistula with no evidence of malfunction or inflammation
 
 vital Signs:
 /56 (BP Location: Right upper arm)  | Pulse 70  | Temp 98.6 F (37 C) (Oral)  | Re
 
sp 16  | Ht 1.778 m (5' 10")  | Wt 68.7 kg (151 lb 7.3 oz)  | SpO2 91%  | BMI 21.73 kg/m 
 
 Data evaluation:
 
 Lab Results 
 Component Value Date 
  BUN 21 2019 
  CREATININE 4.5 (H) 2019 
  EGFR 10 (L) 2019 
   2019 
  K 4.5 2019 
   2019 
  CO2 29 2019 
  CA 9.2 2019 
  PHOS 4.9 (H) 2018 
  MG 2.2 2018 
  ALB 2.4 (L) 2019 
  HGB 8.4 (L) 2019 
 
 Lab Results 
 Component Value Date 
  HGB 8.4 (L) 2019 
  HGB 8.7 (L) 2019 
  HGB 8.9 (L) 2019 
  HGB 8.9 (L) 2019 
  FERRITIN 722 (H) 2017 
  FERRITIN 793 (H) 2016 
  FERRITIN 883 (H) 2016 
  LABIRON 28 2017 
  LABIRON 17 2016 
  LABIRON 31 2016 
 
 Lab Results 
 Component Value Date 
  ANIONGAP 14 2019 
 
 Last 3 Troponin:  
 Lab Results 
 Component Value Date 
  TROPONINI 0.256 (H) 2019 
  TROPONINI 0.274 (H) 2019 
  TROPONINI 0.318 (H) 2019 
 
 Us Lower Extremity Arterial Left 2018 showed Greater than 50% stenosis of the mid sup
erficial femoral artery. 2.  Monophasic single runoff to the ankle via the anterior tibial a
rtery.
 
 X-ray Foot Left 2018 showed amputation of the left forefoot at the level of the proxi
mal metatarsals. No radiographic evidence for osteomyelitis. Normal alignment of the hindfoo
t. Mild degeneration of the midfoot. 
 
 Assessment and Recommendations:
 
 1. ESRD on HD
 2. Anemia chronic renal failure
 3. Chronic systolic CHF s/p ICD
 4. Chest/upperabdominal pain with concern for angina
 5. Anemia chronic renal failure
 6. Chronic anticoagulation
 7. PAD s/p left TMA
 
 8. Paroxysmal Afib
 9. CAD s/p CABG
 
 She is hemodynamically stable with no fever/tachycardia/bradycardia/hypotension or severe h
ypertension/hypoxia at rest.
 She is euvolemic and CXR from 19 does really show any evidence of hypervolemia although
 she has small pleural effusion.
 BP is OK and she remains in SR on EKG from 18. She remains on BB/ARB and small dose of 
loop diuretic as she is nonoliguric.
 It doesn't look like she had an AMI although troponin was marginally elevated without any s
ignificant new EKG changes. She remains on ASA/clopidogrel/apixaban/atorvastatin/long acting
 nitrate. She has been seen by Dr. Luz whose evaluation is noted.
 Gastritis is a consideration. She remains on PPI.
 Metabolic encephalopathy seems to have improved. I reduced her dose of nortriptyline. She r
emains on oxybutynin.  However dizziness/confusion/lethary are well described SE.
 Left foot osteomyelitis is s/p TMA. She is afebrile. Dr. Elliott is following and managing her
 Abx dose/duration.
 
  We will plan on 2.5 hours of HD today again with no UF/lower BFR and 3 K bath to reduce 
any hemodynamic instability. Continue ASA/clopidogrel/apixaban. There would of course with h
igh risk of bleeding (both over and internal). We are not using any heparin during dialysis.
  ? GI evaluation given upper abdominal discomfort and nausea. 
  FU with vascular surgery/ID/Podiatry regarding discharge needs.
  From my side she can be discharged and resume outpatient HD TTS.
  Continue current ABx for now
 
 She should be on a 2 gm Na, 2 gm K, 1 gm PO4, 1 gm/kg protein diet.
 Please dose all medications for an eGFR of less than 15 ml/min/1.73 m2.
 NSAIDS/HOPPER 2 inhibitors should be avoided. Aluminum/magnesium containing antacids and magne
sium/phosphate containing enemas should be avoided.
 Fluid should be restricted to less than 1.5 L in a 24 hr period.
 Strict I/O should be done.
 Plan of care was discussed with Dr. Vincent.
 
 MICHELLE AWAD MD
 1/3/2019
 
 Portions of my previous notes have been carried over for continuity of care.
 She was seen earlier in the day and charting was completed later after rounds.
 Dictation software, Dragon, was used which may contain error for similar sounding words. Pe
rsonal communication is requested for any clarification.
 Prognosis is guarded in view of multiple comorbid illnesses and ESRD including but not limi
ashly to death.
 
   apixaban  2.5 mg Oral BID 
   aspirin  81 mg Oral Daily with breakfast 
   atorvastatin  40 mg Oral Nightly 
   calcium acetate  667 mg Oral TID WC 
   carvedilol  3.125 mg Oral BID WC 
   cefTAZidime  2 g Intravenous After Hemodialysis (see admin instructions) 
   clopidogrel  75 mg Oral Daily 
   furosemide  20 mg Oral Daily 
   insulin glargine  20 Units Subcutaneous Nightly 
   insulin lispro (human)  0-10 Units Subcutaneous TID AC 
   insulin lispro (human)  0-5 Units Subcutaneous Nightly 
   isosorbide mononitrate  60 mg Oral Daily 
   losartan  100 mg Oral Daily 
   nortriptyline  25 mg Oral Daily 
   nortriptyline  50 mg Oral Nightly 
   oxybutynin  2.5 mg Oral BID 
 
   pantoprazole  40 mg Oral QAM AC 
   rOPINIRole  0.75 mg Oral Nightly 
   vancomycin  750 mg Intravenous See Admin Instructions 
 
   Andrés Flores MD - 2019  9:10 AM PSTFormatting of this note may be different from the 
original.
 
 
 CONSULT NOTE
 1/3/2019
 9:10 AM
 
 PRIMARY PHYSICIAN
 Ivy Couch
 
 CONSULT REQUEST FROM
 Winston Vincent DO
 
 HISTORY OF PRESENT ILLNESS
 The patient is a 68 y.o.-year-old female  with history of ESRD on HD via fistula Tue/Thu/Sa
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
 CABG. Recent LLE angioplasty few weeks ago. She had been on  antibiotics for L foot first d
igit infection with possible osteomyelitis since November (Oral levofloxacin and clindamycin
 then transitioned to IV cefepime with HD until 18, no improvement noted on antibiotics
). She had followed with podiatrist and underwent L TMA on 18, intraoperative findings
 encountered purulence at first metatarsal. 
 Admitted on 18 to [facility]. 
 Wound culture shows skin marzena.
 DAy 7 of iv vancomycin and  Day 3 of ceftazidime
 Had 4 days of cefepime 
 Will complete to iv vancmycin 500 mg iv and ceftazidime 2 gm iv after each hemodialysis uni
tluntil 18 for the infected foot.  
 The patient has problem with tremors on the hands  And on and off confusion. 
 Ct head negative on 18
 EEG done show no seizure just diffuse slowing .
 The patient has some nausea and on and off chest pain stable.
 The patient felt better for possible discharge soon.   
 Past Medical History 
 Diagnosis Date 
   Acute MI ([facility])  
  with stenting x 1 
   Anemia associated with chronic renal failure 2016 
   Atrial fibrillation ([facility])  
   Chronic kidney disease  
   Congestive heart failure ([facility])  
   COPD (chronic obstructive pulmonary disease) ([facility])  
   COPD (chronic obstructive pulmonary disease) ([facility]) 2018 
   Coronary artery disease  
  stent placements: 3 total 
   Depression  
   Diabetes other 
  abstracted 
   Diabetes mellitus, type 2 ([facility])  
   ESRD on peritoneal dialysis ([facility])  
   GERD (gastroesophageal reflux disease)  
   Hemodialysis patient ([facility]) 10/2016 
  Stopped peritoneal and restarted hemodialysis 
   Hemorrhage of gastrointestinal tract, unspecified 2017 
 
  low GI, rectal bleeding 
   High blood pressure other 
  abstracted 
   High cholesterol other 
  abstracted 
   Hyperlipidemia  
   Hypertension  
   Hyponatremia 2017 
   Myocardial infarction ([facility]) 2017 
   Other chronic pain  
  feet,  
   Pain of left hip joint 2016 
  due to hip fracture and replacement 
   Partial small bowel obstruction ([facility]) 2017 
  resolved 
   Renal failure  
  Stage 5.   Fistula in the JAN - not on dialysis yet. 
   Unspecified visual disturbance  
  glasses 
 
 Past Surgical History 
 Procedure Laterality Date 
   AV FISTULA PLACEMENT   
  left upper arm AV fistula - failed 
   AV FISTULA REPAIR N/A 10/25/2016 
  Procedure: AV FISTULA - GRAFT REPAIR/REVISION;  Surgeon: Kirt Mclaughlin MD;  Location: [facility] MA
IN OR;  Service: Vascular;  Laterality: N/A;  Superficialization and revision of left arm fi
stula 
   CARDIAC CATHETERIZATION  2017 
   CARPAL TUNNEL RELEASE Bilateral other 
  abstracted 
   CATARACT EXTRACTION Bilateral 2007 
   CATHETER REMOVAL N/A 2016 
  Procedure: DIALYSIS CATHETER - REMOVAL;  Surgeon: Kirt Mclaughlin MD;  Location: [facility] MAIN OR; 
 Service: Vascular;  Laterality: N/A;  PD cath removal 
   CHOLECYSTECTOMY  other 
  abstracted 
   COLONOSCOPY   
   CORONARY ARTERY BYPASS GRAFT   
   CORONARY STENT PLACEMENT  2017 
  Drug Elutin stents to OM, 1 stent to prox. LAD 
   EYE SURGERY   
   FOOT AMPUTATION Left 2018 
  Procedure: FOREFOOT - AMPUTATION;  Surgeon: Srinivasan Jordan DPM;  Location: [facility] MAIN OR;
  Service: Podiatry;  Laterality: Left; 
   HARDWARE PRESENT   
  stent placements x 3, Left hip replacement, vascular stents 2 in left leg 
   HIP ARTHROPLASTY Left 2016 
  Procedure: HIP - ARTHROPLASTY(ALLISON);  Surgeon: Uriel Roa MD;  Location: [facility] MAIN 
OR;  Service: Orthopedics;  Laterality: Left; 
   HYSTERECTOMY   
  complete 
   PACEMAKER INSERTION   
  [employer] pacemaker/defib 
   PERITONEAL CATHETER INSERTION  8/15/2013 
   PERITONEAL CATHETER INSERTION N/A 2016 
  Procedure: LAPAROSCOPIC - PERITONEAL DIALYSIS CATH INSERTION;  Surgeon: Kirt Mclaughlin MD;  Lo
cation: [facility] MAIN OR;  Service: Vascular;  Laterality: N/A;  Removal of old catheter 
   SHOULDER SURGERY Right  
  frozen shoulder 
 
   UNLISTED PROCEDURE ARTHROSCOPY   
  total Left hip 
   vascular stents Left 2017 
  leg (2 stents) 
   VASCULAR SURGERY   
 
 Current Facility-Administered Medications 
 Medication Dose Route Frequency Provider Last Rate Last Dose 
   acetaminophen (TYLENOL) tablet 650 mg  650 mg Oral Q6H PRN Srinivasan Jordan DPM     
  Or 
   acetaminophen (TYLENOL) suppository 650 mg  650 mg Rectal Q6H PRN Srinivasan Jordan DPM 
    
   albumin human 25 % solution 12.5 g  12.5 g Intravenous Q1H PRN Michelle Awad MD   12.5
 g at 19 4745 
   albuterol (PROVENTIL HFA;VENTOLIN HFA) inhaler  2 puff Inhalation Q4H PRN Srinivasan L Freem
an, DPM     
   apixaban (ELIQUIS) tablet 2.5 mg  2.5 mg Oral BID Srinivsaan Jordan DPM   2.5 mg at 01/0
3/19 0831 
   aspirin EC tablet 81 mg  81 mg Oral Daily with breakfast Srinivasan Jordan DPM   81 mg a
t 19 08 
   atorvastatin (LIPITOR) tablet 40 mg  40 mg Oral Nightly Margarita Luz, DO   40 mg at  
   calcium acetate (PHOSLO) capsule 667 mg  667 mg Oral TID  Srinivasan Jordan DPM   667 
mg at 19 08 
   carvedilol (COREG) tablet 3.125 mg  3.125 mg Oral BID  Srinivasan Jordan DPM   3.125 m
g at 19 0837 
   cefTAZidime (FORTAZ) 2 g in sodium chloride (IV) 0.9 % 50 mL IVPB  2 g Intravenous Afte
r Hemodialysis (see admin instructions) Andrés Elliott MD     
   clopidogrel (PLAVIX) tablet 75 mg  75 mg Oral Daily Srinivasan Jordan DPM   75 mg at  0831 
   dextrose 10 % infusion   Intravenous Continuous PRN Srinivasan Jordan DPM     
   dextrose 50 % solution 12 mL  12 mL Intravenous PRN Srinivasan Jordan DPM     
   dextrose 50 % solution 25 mL  25 mL Intravenous PRN Srinivasan Jordan DPM     
   furosemide (LASIX) tablet 20 mg  20 mg Oral Daily GELACIO TerryM   20 mg at  0831 
   glucagon (GLUCAGEN) injection 0.5 mg  0.5 mg Intramuscular PRN Srinivasan Jordan DPM    
 
   glucagon (GLUCAGEN) injection 1 mg  1 mg Intramuscular PRN Srinivasan Jordan DPM     
   HYDROcodone-acetaminophen (NORCO) 5-325 MG per tablet 1 tablet  1 tablet Oral Q4H PRN JESSICA Jordan DPM   1 tablet at 19 1600 
  Or 
   HYDROcodone-acetaminophen (NORCO)  MG per tablet 1 tablet  1 tablet Oral Q4H PRN 
Srinivasan Jordan DPM   1 tablet at 18 1446 
   insulin glargine (LANTUS) injection 20 Units  20 Units Subcutaneous Nightly Augustin Bourne MD   20 Units at 19 2144 
   insulin lispro (human) (HUMALOG) injection 0-10 Units  0-10 Units Subcutaneous TID AC B
axeleloise Jordan DPM   2 Units at 19 1645 
   insulin lispro (human) (HUMALOG) injection 0-5 Units  0-5 Units Subcutaneous Nightly Br
marioeloise Jordan DPM   1 Units at 19 2144 
   isosorbide mononitrate (IMDUR) 24 hr tablet 60 mg  60 mg Oral Daily Srinivasan Jordan DP
M   60 mg at 19 0830 
   loperamide (IMODIUM) capsule 2 mg  2 mg Oral PRN Srinivasan Jordan DPM     
   LORazepam (ATIVAN) tablet 1 mg  1 mg Oral Daily PRN Srinivasan Jordan DPM   1 mg at 01/0
3/19 0055 
   losartan (COZAAR) tablet 100 mg  100 mg Oral Daily Srinivasan Jordan DPM   100 mg at  0831 
   nitroGLYCERIN (NITROSTAT) SL tablet 0.4 mg  0.4 mg Sublingual Q5 Min PRN Srinivasan navas, DPM   0.4 mg at 19 1734 
   nortriptyline (PAMELOR) capsule 25 mg  25 mg Oral Daily Srinivasan Jordan DPM   25 mg at
 19 0830 
 
   nortriptyline (PAMELOR) capsule 50 mg  50 mg Oral Nightly Michelle Awad MD   50 mg at 
19 2144 
   ondansetron (ZOFRAN-ODT) disintegrating tablet 4 mg  4 mg Oral Q6H PRN Srinivasan Jordan
 DPM   4 mg at 19 0623 
  Or 
   ondansetron (ZOFRAN) injection 4 mg  4 mg Intravenous Q6H PRN Srinivasan Jordan DPM   4 
mg at 19 0638 
   oxybutynin (DITROPAN) tablet 2.5 mg  2.5 mg Oral BID Srinivasan Jordan DPM   2.5 mg at 0
19 0831 
   pantoprazole (PROTONIX) EC tablet 40 mg  40 mg Oral QAM AC Srinivasan Jordan DPM   40 mg
 at 19 0605 
   polyethylene glycol (GLYCOLAX) packet 17 g  17 g Oral Daily PRN GELACIO TerryM   
17 g at 19 0837 
   prochlorperazine tablet 5 mg  5 mg Oral BID PRN Srinivasan Jordan, DPM   5 mg at 19
 1135 
   rOPINIRole (REQUIP) tablet 0.75 mg  0.75 mg Oral Nightly Srinivasan Jordan, DPM   0.75 mg
 at 19 2144 
   sodium chloride (PF) 0.9 % flush 10 mL  10 mL Intravenous Q8H PRN Srinivasan APONTE Jordan, DPM 
  10 mL at 18 0747 
   vancomycin (VANCOCIN) 750 mg/250 mL IVPB  750 mg Intravenous See Admin Instructions Rene
ah H Frost, RPH     
 
 Allergies 
 Allergen Reactions 
   Quinapril Hcl Anaphylaxis 
   Digoxin And Related Other (See Comments) 
   toxic 
   Metaxalone Other (See Comments) 
   Morphine Mental Changes 
   Make her crazy/ "funny feeling in my head"
 Has used hydromorphone in-house 
   Pantoprazole Sodium Nausea and Vomiting 
   Penicillins Rash 
   Quinapril Hcl Edema 
   Facial Edema 
   Sudafed [Pseudoephedrine Hcl] Rash 
 
 Social History 
 
 Social History 
   Marital status:  
   Spouse name: N/A 
   Number of children: N/A 
   Years of education: N/A 
 
 Occupational History 
   Not on file. 
 
 Social History Main Topics 
   Smoking status: Former Smoker 
   Packs/day: 1.00 
   Years: 30.00 
   Types: Cigarettes 
   Quit date: 2004 
   Smokeless tobacco: Never Used 
    Comment: quite smoking  
   Alcohol use No 
   Drug use: No 
   Sexual activity: No 
 
 
 Other Topics Concern 
   Not on file 
 
 Social History Narrative 
  She lives with . 2 kids. Worked in banking 
 
 No smoking. No alcohol intake. Recreational Drug Use
 NO Travel
 mp Pets
 Immunization History 
 Administered Date(s) Administered 
   Hepatitis B Adult 2016 
   Influenza, Trivalent W/Preservative 2016 
   Pneumococcal Polysaccharide 23-valent 2012 
  
 
 Pneumococcal
 Influenza
 
 Family History 
 Problem Relation Age of Onset 
   High cholesterol Father  
   Hypertension Father  
   Diabetes Paternal Grandmother  
   Malig hypertherm Neg Hx  
   Kidney disease Neg Hx  
 
 REVIEW OF SYSTEMS
 
 General: The patient noted to have no  problem of fever,no  weight loss
  and no chills.
 
 Neurologic: Denies any headache, any lightheadedness. No loss of
 
 consciousness or seizure.
 
 Cardiac: with  No Chest Pain,with no  Palpitation,no  Dyspnea on Exertion
 
 Pulmonary: Denies cough, wheezing and hemoptysis
 
 GASTROINTESTINAL: with  nausea no vomiting, and diarrhea.
 GENITOURINARY: Noted no dysuria, urgency, or frequency.
 Hematological : Denies any ecchymosis, bleeding or hematuria
 
 Endocrine: Denies any polyphagia, polyuria or hot and cold intolerance
  Musculoskeletal: no new joint pain and swelling. 
 Skin : Denies any new rashes or cutaneous changes.
 
 Rest of the review of systems is unremarkable.
 
 PHYSICAL EXAMINATION
 
 VITAL SIGNS 
 Wt Readings from Last 3 Encounters: 
 18 68.7 kg (151 lb 7.3 oz) 
 18 65.8 kg (145 lb 1 oz) 
 18 65.9 kg (145 lb 4.5 oz) 
 
 Temp Readings from Last 3 Encounters: 
 19 98.1 F (36.7 C) (Oral) 
 
 18 98.2 F (36.8 C) (Oral) 
 18 97.9 F (36.6 C) (Oral) 
 
 BP Readings from Last 3 Encounters: 
 19 124/61 
 18 139/63 
 18 111/61 
 
 Pulse Readings from Last 3 Encounters: 
 19 76 
 18 74 
 18 85 
 
 Head:  Normocephalic atraumatic
 
 HEENT: Pink palpebral conjunctivae. Anicteric sclerae. No
 tonsillopharyngeal congestion.
 
 Neck : Noted no  Cervical Lymphadenopathy
 
 CHEST:Clear Breath Sounds Bilateral . 
 
 HEART: Regular rate and rhythm. 2/6  murmurs no thrills or rubs
 
 ABDOMEN: Soft, flat. No tenderness. No hepatosplenomegaly.
 
 GROIN AREA: Negative  for intertrigo.
 
 EXTREMITIES: upper extremity no lesions.
 Right lower extremities no edema. Left foot with dressing.
  
 
 NEUROLOGIC: No localizing signs.
 
 Skin : NO rash or ecchymosis.
 
 EEG results
 On 18
  
 This wake EEG is abnormal. Diffuse slowing is 
 suggestive of a diffuse encephalopathy of metabolic, degenerative 
 or vascular origin. No epileptiform activity was noted with body 
 jerking. 
 The absence of epileptiform discharge during the EEG recording 
 does not rule out the diagnosis of a seizure disorder. Clinical 
 correlation is recommended. 
 LABORATORY DATA
 
 Lab Results 
 Component Value Date 
  WBC 5.22 2019 
  HGB 8.4 (L) 2019 
  HCT 25.7 (L) 2019 
   (L) 2019 
  CHOL 101 2017 
  TRIG 132 2017 
  HDL 34 (L) 2017 
  ALT 16 2019 
  AST 21 2019 
   2019 
 
  K 4.5 2019 
   2019 
  CREATININE 4.5 (H) 2019 
  BUN 21 2019 
  CO2 29 2019 
  TSH 1.14 2017 
  INR 1.0 2018 
  GLUF 86 2019 
  HGBA1C 7.5 (H) 2018 
 
 Hepatic Function Panel:  
 Lab Results 
 Component Value Date 
  PROT 5.9 (L) 2019 
  ALB 2.4 (L) 2019 
  BILITOT 0.3 2019 
  BILIDIR 0.1 2017 
  ALP 78 2019 
  AST 21 2019 
  ALT 16 2019 
  
 
 Lipase: 
 Lab Results 
 Component Value Date 
  LIPASE 332 2017 
  
 U/A:  
 Lab Results 
 Component Value Date 
  COLORU YELLOW 2011 
  CLARITYU Slightly Cloudy 10/16/2018 
  MEROPENEM 1.009 2013 
  LEUKOCYTESUR  10/16/2018 
    Comment: 
    small 
  NITRITE Negative 10/16/2018 
  UROBILINOGEN Normal 10/16/2018 
  UPRO  10/16/2018 
    Comment: 
    100 
  UPRO 100 2013 
  PHUR 8 10/16/2018 
  BLOODU Negative 10/16/2018 
  KETONES negative 10/16/2018 
  BILIRUBINUR Negative 10/16/2018 
  GLUCOSEU  10/16/2018 
    Comment: 
    moderate 
  
 DIAGNOSTIC IMAGING
 
 CAT scan 
 
 CT head without contrast [[id number]] Collected: 18 1718 
 Order Status: Completed Updated: 18 1733 
 Narrative:  
 CHERIE VILLALOBOS
 1950
 68 years Female
 
 CT HEAD WO CONTRAST
 2018 5:23 PM
 
 INDICATION: Altered mental status in a patient with acute osteomyelitis
 
 COMPARISON: Head CT, 2017
 
 TECHNIQUE: CT scan of the head without contrast. 5-mm axial noncontrast images were acqui
red from the foramen magnum through the cranial vertex. Multiplanar reformations were obtain
ed from the acquisition data.
 
 At least one of the following CT dose optimization techniques were used: Automated exposure
 control; Adjustment of mA and/or kV according to patient size; Use of iterative reconstruct
ion technique.
 
 FINDINGS:
 
 Brain: No intracranial hemorrhage, midline shift or pathologic mass effect. No cerebral kt
ma, mass lesion or evidence of acute infarct.
 
 Ventricles and extra-axial fluid spaces: Normal.
 
 Paranasal sinuses and mastoid air cells: Normal.
 
 Calvarium and extracranial soft tissues: Normal.
 
 Orbits: The patient is status post bilateral cataract surgery. 
 Impression:  
 1. No acute intracranial pathology. 
 
 Xr Chest 2 View
 
 Result Date: 2018
 1.  Mild diffuse pulmonary edema, similar to the prior exam. 2.  Left perihilar airspace op
acity which may represent edema. Recommend repeat chest x-ray in 6-8 weeks to verify resolut
ion. 3.  Small left pleural effusion and/or pleural scarring, with adjacent atelectasis or c
onsolidation, similar to the prior exam. Small right pleural effusion. 4.  Peripheral opacit
y within the left lower hemithorax, slightly increased which is questionable for a loculated
 pleural fluid, parenchymal opacity, or artifact. Electronically signed by Chau Garcia on 1
2018 5:33 PM
 
 X-ray Foot Left
 
 Result Date: 2018
 Amputation of the left forefoot at the level of the proximal metatarsals. Surgical cutaneou
s staples are present. No radiographic evidence for osteomyelitis. Normal alignment of the h
indfoot. Mild degeneration of the midfoot. Electronically signed by Oleksandr Lemus MD on 2018 10:12 AM
 
 Ct Head Without Contrast
 
 Result Date: 2018
 1.  No acute intracranial pathology. Electronically signed by Steven Samano MD on  5:28 PM
 
 Nm Myocardial Perfusion Spect (stress And Rest)
 
 Result Date: 2018
 1.  Mild to moderate left ventricular dilatation, unchanged between rest and stress. 2.  I 
cannot exclude mid anterior wall infarct. 3.  I see no evidence of ischemia. 4.  Global mode
 
rate hypokinesis. 5.  LVEF 29% stress, 33% rest. Using the risk stratification system at our
 institution, this examination equates to at least a high risk based on this imaging, arisin
g from the criteria below (1). Note that this should be interfaced with clinical and other d
patrizia, potentially affecting final categorization. American Heart Association and American Col
lege of Cardiology Scientific Statement. Circulation (2008); 118: p 6522-4163 Electronically
 signed by Wilfrido Knight MD on 2018 4:34 PM
 
 Ir Angiogram Extremity Left
 
 Result Date: 2018
 1. Aorta with narrow aortic bifurcation with moderate stenosis of proximal left common errol
c artery. 2. Left SFA with moderate stenosis proximally. 3. Single vessel runoff to left jeanne
t via left anterior tibial artery. 4. Left posterior tibial artery and peroneal artery were 
occluded with reconstitution of distal posterior tibial artery at the left ankle via collate
rals. 5. If forefoot amputation does not heal, may need popliteal to posterior tibial artery
 bypass versus antegrade access of left common femoral artery with posterior tibial artery r
ecanalization. Electronically signed by Kirt Mclaughlin MD on 2018 10:51 AM
 
 Cherie Villalobos is a 68 y.o. female with the following Problems 
 Patient Active Problem List 
 Diagnosis 
   Proteinuria 
   Vitamin D deficiency 
   Secondary hyperparathyroidism (HCC) 
   Recurrent UTI 
   Coronary artery disease involving native coronary artery of native heart without angina
 pectoris 
   Depression 
   ESRD (end stage renal disease) ([facility] [facility]) 
   Type 2 diabetes mellitus with chronic kidney disease on chronic dialysis, with long-ter
m current use of insulin (HCC) 
   Anemia in ESRD (end-stage renal disease) ([facility]) 
   Hypertension, essential 
   Dyslipidemia 
   Gastroesophageal reflux disease without esophagitis 
   Diabetic foot ulcer (HCC) 
   Loss of weight 
   Dysphagia, unspecified 
   Foot ulcer due to DM  (HCC) 
   Severe protein-calorie malnutrition (HCC) 
   Osteomyelitis of left foot (HCC) 
   Pleural effusion 
   Prolonged Q-T interval on ECG 
   Chronic systolic congestive heart failure (HCC) 
   Chronic diarrhea 
   Chronic anticoagulation 
   Plantar ulcer (HCC) 
   Cardiomyopathy (HCC) 
   COPD (chronic obstructive pulmonary disease) (HCC) 
   ICD (implantable cardioverter-defibrillator) in place 
   Implantable defibrillator reprogramming/check 
   Mixed hyperlipidemia 
   Moderate protein-calorie malnutrition (HCC) 
   Paroxysmal atrial fibrillation (HCC) 
   S/P CABG x 2 
   S/P mitral valve repair 
   Steroid-induced hyperglycemia 
   Stress hyperglycemia 
   Chronic multifocal osteomyelitis of left foot (HCC) 
   PVD (peripheral vascular disease) ([facility]) 
 
   CAD (coronary artery disease) 
 
 Plan:
 
 CBC CMP ESR CRP  Vancomycin trough 
 q Monday 
 Vancomycin 500 mg iv and ceftazidime 2 gm after each hemodialysis
 Follow up in 2 weeks. 
 RECOMMENDATIONS
 continue with t discharge planning for iv vancomycin and ceftazidime for 6 weeks until   
 Discussed with Dr. Vincent hospitalist  who agreed and will proceed with GI consult and cardio
logy follow up. 
 Will sign off reconsult if needed. 
 As plan.
 Thank you very much for the consult.
 ______________________
 
 ______________________
 MD Sukh FRANKLIN Margarita, DO - 2019  7:21 AM PSTFormatting of this note may be different from th
e original.
 [facility]  
 Service:  Cardiology
 Progress Note
 
 Hospital Day:   LOS: 6 days 
 Post-Op Day:  7 Days Post-Op.
 
 SUBJECTIVE
 
      Patient Summary:  Mrs. Villalobos is a 69yo female with a complex cardiac history includin
g CAD s/p CABG, s/p MV repair, ischemic cardiomyopathy with EF 20-25%, ICD placement, paroxy
smal atrial fibrillation, peripheral vascular disease, LBBB, HTN, HLD as well as ESRD, DM II
, COPD who presented for ongoing management of left LE osteomyelitis. She complained of an e
pisode of CP during dialysis which was atypical an possibly GI related. she was found to hav
e an elevated troponin to 0.318, normal CK and CKMB. EKG demonstrated LBBB which is chronic.
 She has had elevations in her troponin in the beginning of December up to 0.257. She underw
ent a pharmacologic stress test at that time which was negative for ischemia. Medical manage
ment was recommended for her existing CAD. 
 
      Events Overnight: Her cognition seems to be improved today. The patient underwent HD y
esterday. She reports that she had some pain in her left arm at her fistula site at the end 
of dialysis. Yesterday evening, she had an episode of chest pain after she ate and a feeling
 of food being stuck in her esophagus. 
 
 Scheduled Medications 
   apixaban  2.5 mg Oral BID 
   aspirin  81 mg Oral Daily with breakfast 
   atorvastatin  40 mg Oral Nightly 
   calcium acetate  667 mg Oral TID WC 
   carvedilol  3.125 mg Oral BID WC 
   cefTAZidime  2 g Intravenous After Hemodialysis (see admin instructions) 
   clopidogrel  75 mg Oral Daily 
   furosemide  20 mg Oral Daily 
   insulin glargine  20 Units Subcutaneous Nightly 
   insulin lispro (human)  0-10 Units Subcutaneous TID AC 
   insulin lispro (human)  0-5 Units Subcutaneous Nightly 
   isosorbide mononitrate  60 mg Oral Daily 
 
   losartan  100 mg Oral Daily 
   nortriptyline  25 mg Oral Daily 
   nortriptyline  50 mg Oral Nightly 
   oxybutynin  2.5 mg Oral BID 
   pantoprazole  40 mg Oral QAM AC 
   rOPINIRole  0.75 mg Oral Nightly 
   vancomycin  750 mg Intravenous See Admin Instructions 
 
 Continuous Infusions 
   dextrose   
 
 PRN Medications
 acetaminophen **OR** acetaminophen, albumin human, albuterol, dextrose, dextrose, dextrose,
 glucagon, glucagon, HYDROcodone-acetaminophen **OR** HYDROcodone-acetaminophen, loperamide,
 LORazepam, nitroGLYCERIN, ondansetron **OR** ondansetron, polyethylene glycol, prochlorpera
zine, saline lock IV - when tolerating PO fluids **AND** sodium chloride (PF)
 
 OBJECTIVE
 Vital Signs:
 /63 (BP Location: Right upper arm)  | Pulse 66  | Temp 97.9 F (36.6 C) (Oral)  | 
Resp 16  | Ht 1.778 m (5' 10")  | Wt 68.7 kg (151 lb 7.3 oz)  | SpO2 98%  | BMI 21.73 kg/m
 
 Temp:  [97.9 F (36.6 C)-98.8 F (37.1 C)] 98.1 F (36.7 C) (724)
 BP: ()/(46-93) 124/61 (724)
 Heart Rate:  [66-86] 76 (724)
 Resp:  [16-18] 18 (724)
 SpO2:  [84 %-100 %] 90 % (724)
 I&O Last 3 Shifts:   1900 - 659
 In: 3511 [P.O.:1550; I.V.:461]
 Out: 1600 [Urine:400]
 Constitutional: frail, NAD, cognition improved
 Neck: No JVD present. No thyromegaly present. 
 Cardiovascular: Regular rhythm, S1 normal and S2 normal.  
 II/VI systolic murmur
 Pulses:
      Carotid pulses are 2+ on the right side, and 2+ on the left side.
      Radial pulses are 2+ on the right side, and 2+ on the left side. 
 Pulmonary/Chest: Effort normal and breath sounds normal. No wheezes. No rales. 
 Abdominal: Soft. No tenderness. 
 Musculoskeletal: Left LE bandaged, right LE with compression stocking, no edema
 Neurological: Alert. No cranial nerve deficit. Spastic movements of her hands improved comp
ared to yesterday.
 Skin: Warm and dry. 
 
 DATA
 
 CBC:  
 Lab Results 
 Component Value Date 
  WBC 5.22 2019 
  RBC 2.48 (L) 2019 
  HGB 8.4 (L) 2019 
  HCT 25.7 (L) 2019 
  .9 (H) 2019 
  MCH 33.8 2019 
  MCHC 32.5 2019 
  RDW 59.5 (H) 2019 
   (L) 2019 
  MPV 9.0 2019 
  DIFFTYPE AUTOMATED 2019 
 
 
 CMP:  
 Lab Results 
 Component Value Date 
   2019 
  K 4.5 2019 
   2019 
  CO2 29 2019 
  ANIONGAP 14 2019 
  GLUF 86 2019 
  BUN 21 2019 
  CREATININE 4.5 (H) 2019 
  BCR 5 2019 
  CA 9.2 2019 
  CA 8.7 2016 
  PROT 5.9 (L) 2019 
  ALB 2.4 (L) 2019 
  GLOB 3.5 2019 
  BILITOT 0.3 2019 
  ALP 78 2019 
  AST 21 2019 
  ALT 16 2019 
  EGFR 10 (L) 2019 
 
 Last 3 Troponin:  
 Lab Results 
 Component Value Date 
  TROPONINI 0.256 (H) 2019 
  TROPONINI 0.274 (H) 2019 
  TROPONINI 0.318 (H) 2019 
 
 ASSESSMENT & PLAN
 
 1. Chest Pain
 2. Elevated Troponin
 3. CAD with history CABG
 4. Osteomyelitis of left foot
 5. ESRD
 6. History MV Repair
 7. Ischemic CMP Ef 20-25%
 8. LBBB
 9. Paroxysmal Afib
 10. History ICD 
 11. PVD
 12. HTN
 13. HLD
 
 - Mrs. Villalobos is a 69 yo female with an extensive cardiac history as outlined above whom ca
rdiology has been asked to consult due to an episode of chest pain during dialysis. She was 
found to have an elevated troponin to 0.318, normal CK and CKMB. She has had elevations in h
er troponin in the beginning of December up to 0.257. She underwent a pharmacologic stress t
est at that time which was negative for ischemia. Her chest pains are atypical and may be GI
 related. 
 - Recommend medical management of existing CAD 
 - Continue ASA, increase Lipitor to 40mg po daily, continue coreg 3.125mg po bid, continue 
imdur 60mg po daily, losartan 100mg po daily. Continue SL nitro PRN.
 - Consider GI workup for her epigastric pain
 - She was recently started on Plavix due to LLE vascular intervention. She is now on triple
 therapy with ASA, Plavix, and Eliquis. Time on triple therapy should be limited to augment 
bleeding risk. Will defer to vascular need for duration of DAPT.
 
 - Consider upgrade of her ICD to CRT-D in the setting of systolic heart failure and LBBB. T
his can be done as outpatient. 
 - Will continue to follow
 
 Margarita Luz DO
 1/3/2019Winston Vincent DO - 2019  1:42 PM PSTFormatting of this note may be different f
rom the original.
 [facility]
 Service:  Hospitalist
 Progress Note
 
 Pt: Cherie Villalobos  AGE/SEX: 68 y.o.   female      : 1950      
 MRN: [id number]          ROOM: 428/428-1   
 TODAY'S DATE: 2019
 
 Hospital Day:   LOS: 5 days 
 SUBJECTIVE
 Patient evaluated at bedside. Patient denies significant foot pain in the left foot. Patien
t had episode of left arm pain and angina symptoms that was resolved with nitroglycerin. Tro
ponin is positive.  Patient was evaluated by cardiology who recommends medical management th
is time. Nephrology to do dialysis today.
 
 Scheduled Medications 
   apixaban  2.5 mg Oral BID 
   aspirin  81 mg Oral Daily with breakfast 
   atorvastatin  40 mg Oral Nightly 
   calcium acetate  667 mg Oral TID WC 
   carvedilol  3.125 mg Oral BID WC 
   cefTAZidime  2 g Intravenous After Hemodialysis (see admin instructions) 
   clopidogrel  75 mg Oral Daily 
   furosemide  20 mg Oral Daily 
   insulin glargine  20 Units Subcutaneous Nightly 
   insulin lispro (human)  0-10 Units Subcutaneous TID AC 
   insulin lispro (human)  0-5 Units Subcutaneous Nightly 
   isosorbide mononitrate  60 mg Oral Daily 
   losartan  100 mg Oral Daily 
   nortriptyline  25 mg Oral Daily 
   nortriptyline  50 mg Oral Nightly 
   oxybutynin  2.5 mg Oral BID 
   pantoprazole  40 mg Oral QAM AC 
   rOPINIRole  0.75 mg Oral Nightly 
   vancomycin  750 mg Intravenous See Admin Instructions 
 
 Continuous Infusions 
   dextrose   
 
 PRN Medications
 acetaminophen **OR** acetaminophen, albumin human, albuterol, dextrose, dextrose, dextrose,
 glucagon, glucagon, HYDROcodone-acetaminophen **OR** HYDROcodone-acetaminophen, loperamide,
 LORazepam, nitroGLYCERIN, ondansetron **OR** ondansetron, polyethylene glycol, prochlorpera
zine, saline lock IV - when tolerating PO fluids **AND** sodium chloride (PF)
 
 Allergy:
 Allergies 
 Allergen Reactions 
   Quinapril Hcl Anaphylaxis 
   Digoxin And Related Other (See Comments) 
   toxic 
   Metaxalone Other (See Comments) 
   Morphine Mental Changes 
 
   Make her crazy/ "funny feeling in my head"
 Has used hydromorphone in-house 
   Pantoprazole Sodium Nausea and Vomiting 
   Penicillins Rash 
   Quinapril Hcl Edema 
   Facial Edema 
   Sudafed [Pseudoephedrine Hcl] Rash 
 
 OBJECTIVE
 Vitals:
 Patient Vitals for the past 24 hrs:
  BP Temp Temp src Pulse Resp SpO2 
 19 1330 114/56 - - 72 16 - 
 19 1315 109/53 - - 70 16 99 % 
 19 1300 - - - - 18 - 
 19 1259 113/53 98.5 F (36.9 C) - 70 18 99 % 
 19 1132 123/60 98.8 F (37.1 C) Oral 76 18 96 % 
 19 0815 124/58 98.5 F (36.9 C) Oral 75 16 99 % 
 19 0315 108/54 98.2 F (36.8 C) Axillary 67 16 98 % 
 19 2241 105/55 98.1 F (36.7 C) Axillary 75 16 98 % 
 19 1904 119/56 98.5 F (36.9 C) Axillary 80 18 98 % 
 19 1647 121/59 - - 76 - 99 % 
 19 1625 137/60 98.7 F (37.1 C) Axillary 76 18 98 % 
 19 1615 129/72 - - 78 18 100 % 
 19 1600 104/54 - - 74 18 100 % 
 19 1545 129/63 - - 76 16 100 % 
 19 1530 129/72 - - 74 16 100 % 
 19 1515 133/55 - - 74 16 99 % 
 19 1500 125/58 - - 74 18 99 % 
 19 1445 125/58 - - 72 18 100 % 
 19 1430 124/61 - - 70 18 99 % 
 19 1418 121/58 - - 74 18 99 % 
 19 1345 128/62 98.1 F (36.7 C) Oral 74 18 - 
 
 I&O Detailed Table: 
 
 Intake/Output Summary (Last 24 hours) at 19 1342
 Last data filed at 19 1144
  Gross per 24 hour 
 Intake              740 ml 
 Output              200 ml 
 Net              540 ml 
 
 No data found.
 
 Hemodynamics Last 24hrs:   
 
 Examination:
 
 Constitutional: Oriented to person, place, and time. appears well-developed and well-nouris
hed. 
 
 HEENT: 
 Head: Normocephalic and atraumatic. 
 Nose: Nose normal. 
 Mouth/Throat: Oropharynx is clear and moist.
 Eyes: Conjunctivae and EOM are normal. Pupils are equal, round, and reactive to light. Righ
t eye exhibits no discharge. Left eye exhibits no discharge. No scleral icterus. 
 Neck: Normal range of motion. Neck supple. No JVD present. No tracheal deviation present. N
o thyromegaly present. no cervical adenopathy.
 
 
 Cardiovascular: Normal rate, regular rhythm, normal heart sounds with S1 and S2, and intact
 distal pulses.  Exam reveals no gallop and no friction rub.  No murmur heard.
 
 Pulmonary/Chest: Effort normal and breath sounds normal. No stridor. No respiratory distres
s.  no wheezes. no rales. exhibits no tenderness. 
 
 Abdominal: Soft. Bowel sounds are normal. exhibits no distension and no mass. There is no t
enderness. There is no rebound and no guarding. 
 
 Extremities/Musculoskeletal: Normal range of motion.exhibits no tenderness.  exhibits no ed
ema. Left leg bandage in place. 
   
 Neurological:  Alert and oriented to person, place, and time. Has normal reflexes.  display
s normal reflexes. No cranial nerve deficit.  Exhibits normal muscle tone. Coordination norm
al. Exhibits twitching 
 
 Skin: Skin is warm and dry. No rash noted. No erythema. No pallor. 
 
 Psychiatric: Has a normal mood and affect. Behavior is normal. Judgment normal. 
 
 LABS:
 WBC 
 Date Value Ref Range Status 
 2019 6.13 3.80 - 11.00 K/uL Final 
 
 RBC 
 Date Value Ref Range Status 
 2019 2.60 (L) 3.70 - 5.10 M/uL Final 
 
 HGB 
 Date Value Ref Range Status 
 2019 8.7 (L) 11.3 - 15.5 g/dL Final 
 
 HCT 
 Date Value Ref Range Status 
 2019 27.0 (L) 34.0 - 46.0 % Final 
 
 MCV 
 Date Value Ref Range Status 
 2019 103.9 (H) 80.0 - 100.0 fl Final 
 
 MCH 
 Date Value Ref Range Status 
 2019 33.6 27.0 - 34.0 pg Final 
 
 MCHC 
 Date Value Ref Range Status 
 2019 32.4 32.0 - 35.5 g/dL Final 
 
 RDW SD 
 Date Value Ref Range Status 
 2019 63.0 (H) 37 - 53 fl Final 
 
 PLT 
 Date Value Ref Range Status 
 2019 156 150 - 400 K/uL Final 
 
 MPV 
 Date Value Ref Range Status 
 
 2019 9.0 fl Final 
 
 NEUTROPHILS ABS 
 Date Value Ref Range Status 
 2019 4.39 1.90 - 7.40 K/uL Final 
 
 LYMPHOCYTES ABS 
 Date Value Ref Range Status 
 2019 0.96 (L) 1.00 - 3.90 K/uL Final 
 
 EOSINOPHILS ABS 
 Date Value Ref Range Status 
 2019 0.00 0.00 - 0.50 K/uL Final 
 
 BASOPHILS ABS 
 Date Value Ref Range Status 
 2019 0.03 0.00 - 0.10 K/uL Final 
   Comment: 
   Testing performed at [facility], [address] [place]  [address] 
 
 Neutrophils Manual 
 Date Value Ref Range Status 
 2019 86 % Final 
 
 Lymphocytes Manual 
 Date Value Ref Range Status 
 2019 10 % Final 
 
 Monocytes Manual 
 Date Value Ref Range Status 
 2019 4 % Final 
 
 MORPHOLOGY 
 Date Value Ref Range Status 
 2019 1+  Final 
   Comment: 
   ANISO
 1+
 MACRO
 NORMAL PLT MORPH
 Testing performed at [facility], [address] [facility], [place]  [address]
  
 
 GLUCOSE 
 Date Value Ref Range Status 
 2019 105 (H) 65 - 99 mg/dL Final 
 
 BUN 
 Date Value Ref Range Status 
 2019 29 (H) 8 - 25 mg/dL Final 
 
 CREATININE 
 Date Value Ref Range Status 
 2019 6.7 (H) 0.50 - 1.00 mg/dL Final 
 
 BUN/CREAT 
 Date Value Ref Range Status 
 2019 4  Final 
 
 TOTAL PROTEIN 
 
 Date Value Ref Range Status 
 2019 6.4 6.3 - 8.2 g/dL Final 
 
 GLOBULIN 
 Date Value Ref Range Status 
 2019 3.9 1.3 - 4.9 g/dL Final 
 
 TBIL 
 Date Value Ref Range Status 
 2019 0.4 0.1 - 1.5 mg/dL Final 
 
 ALT 
 Date Value Ref Range Status 
 2019 17 10 - 65 U/L Final 
 
 AST 
 Date Value Ref Range Status 
 2019 24 10 - 45 U/L Final 
 
 SODIUM 
 Date Value Ref Range Status 
 2019 136 135 - 145 mmol/L Final 
 
 POTASSIUM 
 Date Value Ref Range Status 
 2019 4.8 3.5 - 4.9 mmol/L Final 
 
 CHLORIDE 
 Date Value Ref Range Status 
 2019 98 (L) 99 - 109 mmol/L Final 
 
 CO2 
 Date Value Ref Range Status 
 2019 26 23 - 32 mmol/L Final 
 
 ANION GAP AGAP 
 Date Value Ref Range Status 
 2019 17 5 - 20 mmol/L Final 
 
 [unfilled]
 HDL CHOL 
 Date Value Ref Range Status 
 2017 34 (L) >40 mg/dL Final 
 
 CHOLESTEROL 
 Date Value Ref Range Status 
 2017 101 <200 mg/dL Final 
 
 TSH 
 Date Value Ref Range Status 
 2017 1.14 0.45 - 5.10 uIU/mL Final 
   Comment: 
   Testing performed at [facility];[address] [place] [address];[place] [address] 
 
 TOTAL PROTEIN 
 Date Value Ref Range Status 
 2019 6.4 6.3 - 8.2 g/dL Final 
 
 TBIL 
 Date Value Ref Range Status 
 
 2019 0.4 0.1 - 1.5 mg/dL Final 
 
 BILI, DIRECT 
 Date Value Ref Range Status 
 2017 0.1 0.0 - 0.3 mg/dL Final 
 
 AST 
 Date Value Ref Range Status 
 2019 24 10 - 45 U/L Final 
 
 ALT 
 Date Value Ref Range Status 
 2019 17 10 - 65 U/L Final 
 
 Diagnostic:
 Xr Chest 2 View
 
 Result Date: 2018
 CHERIE VILLALOBOS 1950 68 years Female XR CHEST 2 VIEW FRONTAL AND LATERAL 2018
 5:27 PM INDICATION: Chest pain COMPARISON: 2018, CT chest 2017 TECHNIQUE: Two vie
w chest, PA and lateral views FINDINGS: The heart is stable in size. Patient is post median 
sternotomy. Right cardiac ICD in situ. Mild diffuse coarsening of lung markings. Suspect mil
d underlying interstitial edema. 1.6 cm perihilar opacity noted on the left. Blunting of the
 left costophrenic angle which may be secondary to effusion and/or scarring. Small right ple
ural effusion. Peripheral opacity along the lower lateral hemithorax, slightly increased que
stionable for artifact or loculated pleural fluid. Atelectasis or consolidation within the l
eft lung base, similar to the prior exam. 
 
 1.  Mild diffuse pulmonary edema, similar to the prior exam. 2.  Left perihilar airspace op
acity which may represent edema. Recommend repeat chest x-ray in 6-8 weeks to verify resolut
ion. 3.  Small left pleural effusion and/or pleural scarring, with adjacent atelectasis or c
onsolidation, similar to the prior exam. Small right pleural effusion. 4.  Peripheral opacit
y within the left lower hemithorax, slightly increased which is questionable for a loculated
 pleural fluid, parenchymal opacity, or artifact. Electronically signed by Chau Garcia on 1
2018 5:33 PM
 
 X-ray Foot Left
 
 Result Date: 2018
 CHERIE VILLALOBOS 1950 XR FOOT LEFT 2018 9:54 AM INDICATION: Osteomyelitis of 
the left foot. COMPARISON: 2018 TECHNIQUE: Left foot series, 3 views, PA, obliq
ue and lateral views 
 
 Amputation of the left forefoot at the level of the proximal metatarsals. Surgical cutaneou
s staples are present. No radiographic evidence for osteomyelitis. Normal alignment of the h
indfoot. Mild degeneration of the midfoot. Electronically signed by Oleksandr Lemus MD on 2018 10:12 AM
 
 Nm Myocardial Perfusion Spect (stress And Rest)
 
 Result Date: 2018
 HISTORY: Chest pain. Peripheral vascular disease. Multiple coronary stents. Multiple previo
us myocardial infarctions. CABG. TECHNIQUE: The patient was intravenously injected with 10.5
 millicuries technetium 99m sestamibi. Rest gated supine SPECT images were obtained.  The pa
tient was then intravenously injected with 0.4 mg Regadenason per protocol.    The patient w
as finally intravenously injected with 30.1 mCi technetium 99m sestamibi, and stress gated s
upine SPECT, and prone SPECT scintigraphic images were obtained. COMPARISON: Echocardiogram 
18. Patient was injected on 17 for a rest SPECT study, but no images were perfor
med. Coronary arteriography 18. FINDINGS: Moderate left ventricular dilatation, unchan
ged between rest and stress. Thinning of the proximal inferior wall radiating minimally into
 
 the lateral wall. This is unchanged between rest and stress supine images. Stress prone lanre
ges show resolution of this finding, favoring artifact. There is mild thinning of the mid an
terior wall slightly radiating into the septum, unchanged between rest and stress. I cannot 
exclude infarct. LVEF measures 29% stress, 33% rest. Moderate global hypokinesis. Rest EDV 1
70 mL. Rest  mL. Stress  mL. Stress  mL. Transient ischemic dilatation 
ratio 1.06, normal. 
 
 1.  Mild to moderate left ventricular dilatation, unchanged between rest and stress. 2.  I 
cannot exclude mid anterior wall infarct. 3.  I see no evidence of ischemia. 4.  Global mode
rate hypokinesis. 5.  LVEF 29% stress, 33% rest. Using the risk stratification system at our
 institution, this examination equates to at least a high risk based on this imaging, bri cm from the criteria below (1). Note that this should be interfaced with clinical and other d
patrizia, potentially affecting final categorization. American Heart Association and American Col
lege of Cardiology Scientific Statement. Circulation (2008); 118: p 7996-9200 Electronically
 signed by Wilfrido Knight MD on 2018 4:34 PM
 
 Ir Angiogram Extremity Left
 
 Result Date: 2018
 LOWER EXTREMITY ARTERIOGRAM, UNILATERAL PREOPERATIVE DIAGNOSIS:  Critical limb ischemia and
 need for left forefoot amputation POSTOPERATIVE DIAGNOSIS:  Same PROCEDURE: 1. Moderate con
scious sedation 2. Ultrasound guided access of the right common femoral artery 3. Aortogram 
4. Selective left common femoral artery catheterization with left leg runoff 5. Left common 
iliac artery angioplasty using 6 x 40 mm angioplasty balloon 6. Drug eluting balloon angiopl
asty of left SFA using 4 x 100 mm Lutonix balloon SURGEON: Kirt Mclaughlin MD ASSISTANT: None ANEST
HESIA: Moderate sedation and local anesthesia Informed consent was obtained from the patient
. Continuous cardiac monitoring was performed throughout the procedure. Conscious sedation w
as provided by the nursing staff during the procedure under my supervision. Sedation time: 5
6 minutes Medications: 1 mg Versed IV, 100 mcg Fentanyl IV ESTIMATED BLOOD LOSS: Minimal CON
TRAST:  55 mL of Isovue 250 INDICATIONS:  See preoperative history and physical FINDINGS:  A
jabier with narrow aortic bifurcation. Moderate stenosis of proximal left common iliac artery 
making passing sheath difficult. Left SFA with moderate stenosis proximally. Single vessel r
unoff seen via left anterior tibial artery to foot. The posterior tibial artery does reconst
itute in left ankle via collaterals. Unable to pass sheath over aortic bifurcation due to il
iac disease and small arteries. After angioplasty, moderate angiographic results. Should be 
able to heal left forefoot amputation. If amputation does not heal, may need popliteal to po
sterior tibial artery bypass versus antegrade access of left common femoral artery with post
erior tibial artery recanalization. DESCRIPTION OF PROCEDURE:  The patient was properly iden
tified and brought to the Cath Lab. The patient was placed supine on the catheter table and 
patient was given moderate sedation by nursing staff under my supervision. Patient's bilater
al groins were then prepped and draped in the usual sterile fashion. Local anesthetic was gi
fidelia to the right groin and the right common femoral artery was accessed under ultrasound oksana
dance using a micropuncture needle. A micropuncture sheath was inserted over a wire and up s
ized to a 4 French sheath. A Omni flush catheter was then inserted into the distal aorta and
 aortogram was then performed with the findings as described above. The Omni Flush catheter 
was then used to guide a Glidewire into the left common femoral artery and then the catheter
 was then advanced over the wire. A left lower extremity runoff was then performed with the 
findings as described above. Next, an Amplatzer wire was then inserted over the catheter and
 the 4 French sheath was removed. A 6 French destination sheath was then inserted over the w
antione with the tip of the sheath being placed into the proximal left common iliac artery. The 
sheath would not pass through the diseased left common iliac artery. Angioplasty of the left
 common iliac artery was then performed using 6 x 40 mm angioplasty balloon. However, the sh
eath with still not pass after angioplasty. A vertebral catheter was inserted over the wire 
and the wire was then removed. A Glidewire was then placed into the vertebral catheter and u
sed to cross through the stenotic left superficial femoral artery.  Next, a 260 fix core wir
e was then inserted over the catheter.  Drug eluting balloon angioplasty of the left SFA was
 then performed using 4 x 100 mm Lutonix balloon. Unable to cross left posterior tibial britt
ry occlusion due to tip is sheath only being in left common iliac artery. A final arteriogra
m was then performed through the sheath. The destination sheath was then removed and a Mynx 
closure device was then used on the right common femoral artery and hemostasis was ensured. 
 
The patient was then taken to the observation unit for further monitoring. 
 
 1. Aorta with narrow aortic bifurcation with moderate stenosis of proximal left common errol
c artery. 2. Left SFA with moderate stenosis proximally. 3. Single vessel runoff to left jeanne
t via left anterior tibial artery. 4. Left posterior tibial artery and peroneal artery were 
occluded with reconstitution of distal posterior tibial artery at the left ankle via collate
rals. 5. If forefoot amputation does not heal, may need popliteal to posterior tibial artery
 bypass versus antegrade access of left common femoral artery with posterior tibial artery r
ecanalization. Electronically signed by Kirt Mclaughlin MD on 2018 10:51 AM
 
 Echo Cardiac Adult With Contrast
 
 Result Date: 2018
 Patient Name:  CHERIE VILLALOBOS YOB: 1950 Accession:  [id number] Performing Phys
ician:   Duglas Ornelas MD _______________________________________________________________ IND
ICATIONS ----------- SHORTNESS OF BREATH, DEFINITY WAS USED/SIGNIFICANT PATIENT REACTION FLA
NK PAIN CONCLUSIONS ----------- 1. Overall left ventricular systolic function is severely im
paired with, an EF between 20 - 25 %. 2. There is mild aortic regurgitation. 3. There is mil
d to  moderate aortic stenosis present. 4. Mild-to-moderate mitral regurgitation is present.
 5. There is moderate pulmonary hypertension. FINDINGS -------- Study: A 2-dimensional trans
thoracic echocardiogram with m-mode, spectral and color flow Doppler was perfomed. Study: Th
is was a technically adequate study. Left Ventricle: Overall left ventricular systolic funct
ion is severely impaired with, an EF between 20 - 25 %. Left Ventricle: The left ventricle c
avity size is normal. Left Ventricle: Left ventricular wall thickness is normal. Right Ventr
icle: The right ventricle is mildly enlarged measuring between 3.4 - 3.7 cm. Right Ventricle
: The right ventricular systolic function is at the low end of normal. Right Ventricle: Pace
r/ICD wire seen. Left Atrium: The left atrial size is normal Left Atrium: , and the LA measu
res 4.6cm. Right Atrium: The right atrium is normal in size. Right Atrium: Pacemaker wire se
en in the right atrial cavity. Aortic Valve: The aortic valve is trileaflet. Aortic Valve: T
he aortic valve is mildly calcified. Aortic Valve: There is mild aortic regurgitation. Aorti
c Valve: There is moderate aortic stenosis present. Mitral Valve: Mild-to-moderate mitral re
gurgitation is present. Mitral Valve: Mild mitral annular calcification present. Tricuspid V
alve: The tricuspid valve appears structurally normal. Tricuspid Valve: Mild tricuspid regur
gitation present. Tricuspid Valve: There is moderate pulmonary hypertension. Tricuspid Valve
: The right ventricular systolic pressure (pulmonary artery systolic pressure), as measured 
by Doppler, is 55.99mmHg. Pulmonic Valve: The pulmonic valve was not well visualized. Pulmon
ic Valve: Mild pulmonic regurgitation. Pericardium: There is no pericardial effusion. IVC/He
patic Veins: The IVC is normal size (1.5-2.5cm) and collapses >50% with sniff, consistent wi
 central venous pressures of 5-10mmHg. Contrast: Poor visualization. Definity was used to 
opacify the left ventricular chamber and improve delineation of the endocardial border. DIANE
UREMENTS ------------- CHEIKH Planimetry:   2.21 cm2 AVAI Planimetry:   0.00 cm2/m2 RA Area:   
13.84 cm2 IVC:   1.61 cm LA Major:   5.27 cm EDV(Teich):   121.12 ml IVSd:   0.82 cm LVIDd: 
  5.05 cm LVPWd:   0.88 cm LVOT Diam:   1.87 cm %FS:   9.90 % EF(Teich):   21.60 % ESV(Teich
):   94.95 ml IVSs:   0.96 cm LVIDs:   4.55 cm LVPWs:   1.05 cm SV(Teich):   26.17 ml RA Adan
or:   4.85 cm RVIDd:   3.62 cm LVEF MOD A2C:   23.68 % SV MOD A2C:   32.38 ml LVEF MOD A4C: 
  21.82 % SV MOD A4C:   27.33 ml EF Biplane:   22.47 % LVEDV MOD BP:   131.72 ml LVESV MOD B
P:   102.11 ml LVEDV MOD A2C:   136.73 ml LVLd A2C:   8.73 cm LVEDV MOD A4C:   125.25 ml LVL
d A4C:   8.87 cm LVESV MOD A2C:   104.34 ml LVLs A2C:   8.34 cm LVESV MOD A4C:   97.91 ml LV
Ls A4C:   8.60 cm LAESV(A-L):   48.15 ml LAESV Index (A-L):   26.17 ml/m2 LAAs A2C:   16.24 
cm2 LAESV A-L A2C:   47.34 ml LALs A2C:   4.73 cm LAAs A4C:   16.52 cm2 LAESV A-L A4C:   47.
99 ml LALs A4C:   4.83 cm Ao Diam:   2.59 cm AV Cusp:   1.65 cm LA Diam:   4.60 cm LA/Ao:   
1.77 TAPSE:   1.57 cm IVC diameter:   1.61 cm IVC collapse:   0.68 cm IVC % collapse:   56.1
6 % AR Dec Lipscomb:   3.95 m/s2 AR Dec Time:   955.07 ms AR maxP.06 mmHg AR PHT:   276.
97 ms AR Vmax:   3.77 m/s HR:   68.91 BPM AV maxP.56 mmHg AV meanPG:   10.23 mmHg AV 
Vmax:   2.21 m/s AV Vmean:   1.53 m/s AV VTI:   47.77 cm CHEIKH Vmax:   1.03 cm2 CHEIKH (VTI):   0
.99 cm2 AVAI Vmax:   0.00 cm2/m2 AVAI (VTI):   0.00 cm2/m2 LVCI Dopp:   1.80 l/minm2 LVCO Do
pp:   3.32 l/min HR:   70.30 BPM LVOT maxP.71 mmHg LVOT meanP.55 mmHg LVSI Dopp:
   25.70 ml/m2 LVSV Dopp:   47.30 ml LVOT Vmax:   0.82 m/s LVOT Vmean:   0.58 m/s LVOT VTI: 
  17.07 cm MCO:   459.56 ms MV A Hiram:   0.83 m/s MV DecT:   232.85 ms MV E Hiram:   1.70 m/s M
V E/A Ratio:   2.05 MV PHT:   80.47 ms MVA By PHT:   2.73 cm2 MV A Dur:   82.77 ms MV maxP.14 mmHg MV meanP.79 mmHg MV Vmax:   1.74 m/s MV Vmean:   1.01 m/s MV VTI:   45.3
3 cm MVA (VTI):   1.04 cm2 Septal e':   0.04 m/s Septal E/e':   38.01 Lateral e':   0.08 m/s
 Lateral E/e':   21.28 P Vein D:   0.84 m/s P Vein S/D Ratio:   0.56 P Vein S:   0.47 m/s PA
EDP:   8.38 mmHg PRend P.38 mmHg PRend Vmax:   1.16 m/s HR:   69.37 BPM PV maxP.
87 mmHg PV meanP.11 mmHg PV Vmax:   0.68 m/s PV Vmean:   0.50 m/s PV VTI:   15.15 cm R
AP:   3 mmHg RV S':   0.08 m/s RVSP:   55.99 mmHg TR maxP.99 mmHg TR Vmax:   3.63 m/s
 TV A Hiram:   0.44 m/s TV Dec Lipscomb:   6.11 m/s2 TV Dec Time:   133.74 ms TV E Hiram:   0.81 m/
s TV E/A Ratio:   1.81 Sonographer: CEE Authenticated by: Duglas Ornelas MD Report Date/Time: 
2018 17:39:35 
 
 1. Overall left ventricular systolic function is severely impaired with, an EF between 20 -
 25 %. 2. There is mild aortic regurgitation. 3. There is mild to  moderate aortic stenosis 
present. 4. Mild-to-moderate mitral regurgitation is present. 5. There is moderate pulmonary
 hypertension.
 
 Ir Guidance V[facility] Access Us
 
 Result Date: 2018
 This Point of Care (POC) ultrasound image has been reviewed and interpreted by the physicia
n identified as the performing physician in the associated interpretation and report. 
 
 PROBLEM LIST
 Principal Problem:
   Osteomyelitis of left foot (HCC)
 Active Problems:
   Secondary hyperparathyroidism (HCC)
   Depression
   ESRD (end stage renal disease) (HCC)
   Type 2 diabetes mellitus with chronic kidney disease on chronic dialysis, with long-term 
current use of insulin (HCC)
   Anemia in ESRD (end-stage renal disease) (HCC)
   Hypertension, essential
   Diabetic foot ulcer (HCC)
   Chronic systolic congestive heart failure (HCC)
   Chronic anticoagulation
   Cardiomyopathy (HCC)
   Paroxysmal atrial fibrillation (HCC)
   S/P CABG x 2
   S/P mitral valve repair
   PVD (peripheral vascular disease) (HCC)
   CAD (coronary artery disease)
 
 ASSESSMENT & PLAN
 
 Osteomyelitis of left foot SP left TMA by Dr. Jordan on 18. Bone culture grew normal
 skin marzena. However bone pathology is pending. Patient is receiving cefepime with each HD p
er nephrology and ID recommendations. 
 - Dr. Jordan is following patient in the hospital. 
 - ID consult from Dr. Elliott appreciated. Will follow recommendations regarding use of future
 antibiotic.  
 - Continue vanc and ceftazidime 
 
 Phantom pain.Resolved.
 
 Twitching with AMS - currently resolved
 
 ESRD. On HD every T-T-S. Last HD yesterday. Appreciate help from nephrology services. 
 - He to get dialysis today
 
 Type II DM with complications. Most recent blood sugars have been in acceptable range . She
 
 is on Lantus and SSI. Last glycohemoglobin A1c was 7.5 on 18. Will continue current r
egimen of Lantus and Humalog. 
 
 CAD SP CABG, paroxysmal atrial fibrillation, HFrEF. patient had elevation in troponin assoc
iated with chest pain yesterday. Discussed case with cardiology, they recommended medical ma
nagkaren at this time.
 Head lengthy discussion with patient this morning about severity of her cardiac disease.
 -  Will continue apixaban, carvedilol, aspirin, Plavix, losartan and Imdur. 
 - Sublingual nitroglycerin prn
 
 History of PAD. SP left leg angioplasty. On Plavix and aspirin. 
 
 Anemia of chronic kidney disease. Management per nephrology services. 
 
 Disposition: [facility] george Vincent, DO
 2019 1:42 PM
 
 Michelle Awad MD - 2019  1:13 PM PSTFormatting of this note may be different from t
adrianna original.
 428/428-1  
    LOS: 5 days 
 She was admitted with plan for left TMA with Dr. Jordan which she had done 18.
 She was seen on HD with RN, Denys. No complications during HD identified.
 She ate today and did indicate some discomfort and food catching in her upper abdomen witho
ut any vomiting. She did indicate nausea. She has no diarrhea/constipation.
 She has no chest pain. She remains non ambulatory.
 
 PMH, PSH, Social history reviewed in chart. Allergies and medications reviewed. Previous hi
story summarized as above. Prior lab data and imaging reviewed.Patient's old records and lab
s were reviewed in detail and summarized.
 
 ROS:
 Review of systems is as per history of present illness. Otherwise a 14 organ system review 
of systems was done and is negative.
 
 Examination:
 
 APPEARANCE: She is is alert, awake, sitting up in bed in no distress. 
 VITALS: Reviewed as listed.
 HEAD: NC/AT. 
 EYES: EOMI. Non-icteric sclera.
 NECK: No JVD. 
 LUNGS: Effort fair. CTA. 
 HEART: S1 soft, no pericardial rub noted. Systolic murmur at apex with radiation to back.
 ABDOMEN: Soft with minimal distention, no tenderness. No organomegaly or bruits noted. Benoit
l sounds diminished.
 EXTREMITIES: No LE edema. No cyanosis or clubbing. Peripheral pulses not palpable. Left jeanne
t s/p TMA with bandage.
 SKIN: Warm to touch. No rash.
 NEUROLOGIC: Alert, awake, oriented, speech fluent. Gait not tested. 
 PSYCH: pleasant demeanor..
 BACK: No CVA tenderness noted. There is no sacral edema.
 
 Dialysis access
 Left brachiocephalic AV fistula with no evidence of malfunction or inflammation
 
 vital Signs:
 /53  | Pulse 70  | Temp 98.5 F (36.9 C)  | Resp 18  | Ht 1.778 m (5' 10")  | Wt 6
 
8.7 kg (151 lb 7.3 oz)  | SpO2 99%  | BMI 21.73 kg/m 
 
 Data evaluation:
 
 Lab Results 
 Component Value Date 
  BUN 29 (H) 2019 
  CREATININE 6.7 (H) 2019 
  EGFR 6 (L) 2019 
   2019 
  K 4.8 2019 
  CL 98 (L) 2019 
  CO2 26 2019 
  CA 9.1 2019 
  PHOS 4.9 (H) 2018 
  MG 2.2 2018 
  ALB 2.5 (L) 2019 
  HGB 8.7 (L) 2019 
 
 Lab Results 
 Component Value Date 
  HGB 8.7 (L) 2019 
  HGB 8.9 (L) 2019 
  HGB 8.9 (L) 2019 
  FERRITIN 722 (H) 2017 
  FERRITIN 793 (H) 2016 
  FERRITIN 883 (H) 2016 
  LABIRON 28 2017 
  LABIRON 17 2016 
  LABIRON 31 2016 
 
 Lab Results 
 Component Value Date 
  ANIONGAP 17 2019 
 
 Last 3 Troponin:  
 Lab Results 
 Component Value Date 
  TROPONINI 0.256 (H) 2019 
  TROPONINI 0.274 (H) 2019 
  TROPONINI 0.318 (H) 2019 
 
 Us Lower Extremity Arterial Left 2018 showed Greater than 50% stenosis of the mid sup
erficial femoral artery. 2.  Monophasic single runoff to the ankle via the anterior tibial a
rtery.
 
 X-ray Foot Left 2018 showed amputation of the left forefoot at the level of the proxi
mal metatarsals. No radiographic evidence for osteomyelitis. Normal alignment of the hindfoo
t. Mild degeneration of the midfoot. 
 
 Assessment and Recommendations:
 
 1. ESRD on HD
 2. Chronic systolic CHF s/p ICD
 3. Chest pain with concern for angina
 4. Anemia chronic renal failure
 5. Chronic anticoagulation
 6. PAD s/p left TMA
 7. Paroxysmal Afib
 8. CAD s/p CABG
 
 
 She is hemodynamically stable with no fever/tachycardia/bradycardia/hypotension or severe h
ypertension/hypoxia at rest.
 She is euvolemic and CXR from 19 does really show any evidence of hypervolemia although
 she has small pleural effusion.
 BP is OK and she remains in SR on EKG from 18. She remains on BB/ARB and small dose of 
loop diuretic as she is nonoliguric.
 It doesn't look like she had an AMI yesterday although troponin is marginally elevated with
out any significant new EKG changes. She remains on ASA/clopidogrel/apixaban/atorvastatin/lo
ng acting nitrate.
 Gastritis is a consideration. She remains on PPI.
 Metabolic encephalopathy seems to have improved. I reduced her dose of nortriptyline yester
day. She remains on oxybutynin.  However dizziness/confusion/lethary are well described SE.
 Left foot osteomyelitis is s/p TMA. She is afebrile. Dr. Elliott is following and managing her
 Abx dose/duration.
 
  We will plan on 2.5 hours of HD with no UF/lower BFR and 3 K bath to reduce any hemodyna
braden instability. Continue ASA/clopidogrel/apixaban. There would of course with high risk of 
bleeding (both over and internal). We are not using any heparin during dialysis.
  ? GI evaluation given upper abdominal discomfort and nausea. 
  FU with vascular surgery/ID/Podiatry regarding discharge needs.
  Continue current ABx for now
 
 She should be on a 2 gm Na, 2 gm K, 1 gm PO4, 1 gm/kg protein diet.
 Please dose all medications for an eGFR of less than 15 ml/min/1.73 m2.
 NSAIDS/HOPPER 2 inhibitors should be avoided. Aluminum/magnesium containing antacids and magne
sium/phosphate containing enemas should be avoided.
 Fluid should be restricted to less than 1.5 L in a 24 hr period.
 Strict I/O should be done.
 Daily CMP should be done (including Mg, PO4).
 Plan of care was discussed with Dr. Vincent.
 
 MICHELLE AWAD MD
 2019
 
 Portions of my previous notes have been carried over for continuity of care.
 She was seen earlier in the day and charting was completed later after rounds.
 Dictation software, Dragon, was used which may contain error for similar sounding words. Pe
rsonal communication is requested for any clarification.
 Prognosis is guarded in view of multiple comorbid illnesses and ESRD including but not limi
ashly to death.
 
   apixaban  2.5 mg Oral BID 
   aspirin  81 mg Oral Daily with breakfast 
   atorvastatin  40 mg Oral Nightly 
   calcium acetate  667 mg Oral TID WC 
   carvedilol  3.125 mg Oral BID WC 
   cefTAZidime  2 g Intravenous After Hemodialysis (see admin instructions) 
   clopidogrel  75 mg Oral Daily 
   furosemide  20 mg Oral Daily 
   insulin glargine  20 Units Subcutaneous Nightly 
   insulin lispro (human)  0-10 Units Subcutaneous TID AC 
   insulin lispro (human)  0-5 Units Subcutaneous Nightly 
   isosorbide mononitrate  60 mg Oral Daily 
   losartan  100 mg Oral Daily 
   nortriptyline  25 mg Oral Daily 
   nortriptyline  50 mg Oral Nightly 
   oxybutynin  2.5 mg Oral BID 
   pantoprazole  40 mg Oral QAM AC 
   rOPINIRole  0.75 mg Oral Nightly 
 
   vancomycin  750 mg Intravenous See Admin Instructions 
 
   dextrose   
 
 Boni Llanos TYLER - 2019 10:56 AM PSTI( visited with Cherie her friend jennifer was prescorrine
t. Cherie shared since being admitted she has undergone several procedures. She continue to v
omite the medical team is running test to determine the cause. She seemed to be up beat and 
will to engage in conversation. I shared her story and provide spiritual support. Chito Elliott MD - 2019  8:49 AM PSTFormatting of this note may be different from the original.
 
 
 CONSULT NOTE
 2019
 8:49 AM
 
 PRIMARY PHYSICIAN
 Ivy Couch
 
 CONSULT REQUEST FROM
 Winston Vincent DO
 
 HISTORY OF PRESENT ILLNESS
 The patient is a 68 y.o.-year-old female  with history of ESRD on HD via fistula Tu/u/Sa
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
 CABG. Recent LLE angioplasty few weeks ago. She had been on  antibiotics for L foot first d
igit infection with possible osteomyelitis since November (Oral levofloxacin and clindamycin
 then transitioned to IV cefepime with HD until 18, no improvement noted on antibiotics
). She had followed with podiatrist and underwent L TMA on 18, intraoperative findings
 encountered purulence at first metatarsal. 
 Admitted on 18 to [facility]. 
 Wound culture shows skin marzena.
 DAy 6 of iv vancomycin and  Day 2 of ceftazidime
 Had 4 days of cefepime 
 Will complete to iv vancmycin 500 mg iv and ceftazidime 2 gm iv after each hemodialysis uni
tluntil 18 for the infected foot.  
 The patient has problem with tremors on the hands  And on and off confusion. 
 Ct head negative on 18
 EEG done show no seizure just diffuse slowing .
 The patient has some nausea and on and off chest pain stable.  
 Past Medical History 
 Diagnosis Date 
   Acute MI ([facility])  
  with stenting x 1 
   Anemia associated with chronic renal failure 2016 
   Atrial fibrillation ([facility])  
   Chronic kidney disease  
   Congestive heart failure ([facility])  
   COPD (chronic obstructive pulmonary disease) ([facility])  
   COPD (chronic obstructive pulmonary disease) ([facility]) 2018 
   Coronary artery disease  
  stent placements: 3 total 
   Depression  
   Diabetes other 
  abstracted 
   Diabetes mellitus, type 2 ([facility])  
   ESRD on peritoneal dialysis ([facility])  
   GERD (gastroesophageal reflux disease)  
   Hemodialysis patient ([facility]) 10/2016 
  Stopped peritoneal and restarted hemodialysis 
   Hemorrhage of gastrointestinal tract, unspecified 2017 
 
  low GI, rectal bleeding 
   High blood pressure other 
  abstracted 
   High cholesterol other 
  abstracted 
   Hyperlipidemia  
   Hypertension  
   Hyponatremia 2017 
   Myocardial infarction ([facility]) 2017 
   Other chronic pain  
  feet,  
   Pain of left hip joint 2016 
  due to hip fracture and replacement 
   Partial small bowel obstruction ([facility]) 2017 
  resolved 
   Renal failure  
  Stage 5.   Fistula in the JAN - not on dialysis yet. 
   Unspecified visual disturbance  
  glasses 
 
 Past Surgical History 
 Procedure Laterality Date 
   AV FISTULA PLACEMENT   
  left upper arm AV fistula - failed 
   AV FISTULA REPAIR N/A 10/25/2016 
  Procedure: AV FISTULA - GRAFT REPAIR/REVISION;  Surgeon: Kirt Mclaughlin MD;  Location: [facility]
IN OR;  Service: Vascular;  Laterality: N/A;  Superficialization and revision of left arm fi
stula 
   CARDIAC CATHETERIZATION  2017 
   CARPAL TUNNEL RELEASE Bilateral other 
  abstracted 
   CATARACT EXTRACTION Bilateral 2007 
   CATHETER REMOVAL N/A 2016 
  Procedure: DIALYSIS CATHETER - REMOVAL;  Surgeon: Kirt Mclaughlin MD;  Location: [facility] MAIN OR; 
 Service: Vascular;  Laterality: N/A;  PD cath removal 
   CHOLECYSTECTOMY  other 
  abstracted 
   COLONOSCOPY   
   CORONARY ARTERY BYPASS GRAFT   
   CORONARY STENT PLACEMENT  2017 
  Drug Elutin stents to OM, 1 stent to prox. LAD 
   EYE SURGERY   
   FOOT AMPUTATION Left 2018 
  Procedure: FOREFOOT - AMPUTATION;  Surgeon: Srinivasan Jordan DPM;  Location: [facility] MAIN OR;
  Service: Podiatry;  Laterality: Left; 
   HARDWARE PRESENT   
  stent placements x 3, Left hip replacement, vascular stents 2 in left leg 
   HIP ARTHROPLASTY Left 2016 
  Procedure: HIP - ARTHROPLASTY(ALLISON);  Surgeon: Uriel Roa MD;  Location: [facility] MAIN 
OR;  Service: Orthopedics;  Laterality: Left; 
   HYSTERECTOMY   
  complete 
   PACEMAKER INSERTION   
  [employer] pacemaker/defib 
   PERITONEAL CATHETER INSERTION  8/15/2013 
   PERITONEAL CATHETER INSERTION N/A 2016 
  Procedure: LAPAROSCOPIC - PERITONEAL DIALYSIS CATH INSERTION;  Surgeon: Kirt Mclaughlin MD;  Lo
cation: [facility] MAIN OR;  Service: Vascular;  Laterality: N/A;  Removal of old catheter 
   SHOULDER SURGERY Right  
  frozen shoulder 
 
   UNLISTED PROCEDURE ARTHROSCOPY   
  total Left hip 
   vascular stents Left 2017 
  leg (2 stents) 
   VASCULAR SURGERY   
 
 Current Facility-Administered Medications 
 Medication Dose Route Frequency Provider Last Rate Last Dose 
   acetaminophen (TYLENOL) tablet 650 mg  650 mg Oral Q6H PRN Srinivasan Jordan DPM     
  Or 
   acetaminophen (TYLENOL) suppository 650 mg  650 mg Rectal Q6H PRN Srinivasan Jordan DPM 
    
   albumin human 25 % solution 12.5 g  12.5 g Intravenous Q1H PRN Michelle Awad MD   12.5
 g at 19 1545 
   albuterol (PROVENTIL HFA;VENTOLIN HFA) inhaler  2 puff Inhalation Q4H PRN Srinivasan Dhillon
anREBEKAH     
   apixaban (ELIQUIS) tablet 2.5 mg  2.5 mg Oral BID Srinivasan Jordan DPM   2.5 mg at  0743 
   aspirin EC tablet 81 mg  81 mg Oral Daily with breakfast Srinivasan Jordan DPM   81 mg a
t 19 0742 
   atorvastatin (LIPITOR) tablet 40 mg  40 mg Oral Nightly Margarita Luz DO     
   calcium acetate (PHOSLO) capsule 667 mg  667 mg Oral TID WC Srinivasan Jordan DPM   667 
mg at 19 0743 
   carvedilol (COREG) tablet 3.125 mg  3.125 mg Oral BID  Srinivasan Jordan DPM   3.125 m
g at 19 0743 
   cefTAZidime (FORTAZ) 2 g in sodium chloride (IV) 0.9 % 50 mL IVPB  2 g Intravenous Afte
r Hemodialysis (see admin instructions) Andrés Elliott MD     
   clopidogrel (PLAVIX) tablet 75 mg  75 mg Oral Daily Srinivasan Jordan DPM   75 mg at  0743 
   dextrose 10 % infusion   Intravenous Continuous PRN Srinivasan Jordan DPM     
   dextrose 50 % solution 12 mL  12 mL Intravenous PRN Srinivasan Jordan DPM     
   dextrose 50 % solution 25 mL  25 mL Intravenous PRN Srinivasan Jordan DPM     
   furosemide (LASIX) tablet 20 mg  20 mg Oral Daily Srinivasan Jordan DPM   20 mg at  0744 
   glucagon (GLUCAGEN) injection 0.5 mg  0.5 mg Intramuscular PRN Srinivasan Jordan DPM    
 
   glucagon (GLUCAGEN) injection 1 mg  1 mg Intramuscular PRN Sriinvasan Jordan DPM     
   HYDROcodone-acetaminophen (NORCO) 5-325 MG per tablet 1 tablet  1 tablet Oral Q4H PRN JESSICA Jordan DPM   1 tablet at 18 2114 
  Or 
   HYDROcodone-acetaminophen (NORCO)  MG per tablet 1 tablet  1 tablet Oral Q4H PRN 
Srinivasan Jordan DPM   1 tablet at 18 1446 
   insulin glargine (LANTUS) injection 20 Units  20 Units Subcutaneous Nightly Augustin Bourne MD   20 Units at 19 2200 
   insulin lispro (human) (HUMALOG) injection 0-10 Units  0-10 Units Subcutaneous TID AC B
axel Jordan DPM   Stopped at 19 0546 
   insulin lispro (human) (HUMALOG) injection 0-5 Units  0-5 Units Subcutaneous Nightly Br
mario Jordan DPM   Stopped at 19 2119 
   isosorbide mononitrate (IMDUR) 24 hr tablet 60 mg  60 mg Oral Daily GELACIO Terry   60 mg at 19 0743 
   loperamide (IMODIUM) capsule 2 mg  2 mg Oral PRN Srinivasan Jordan, DPM     
   LORazepam (ATIVAN) tablet 1 mg  1 mg Oral Daily PRN Srinivasan Jordan, DPM   1 mg at 01 1822 
   losartan (COZAAR) tablet 100 mg  100 mg Oral Daily Srinivasan Jordan, DPM   100 mg at  0742 
   nitroGLYCERIN (NITROSTAT) SL tablet 0.4 mg  0.4 mg Sublingual Q5 Min PRN Srinivasan navas, DPM   0.4 mg at 19 1630 
   nortriptyline (PAMELOR) capsule 25 mg  25 mg Oral Daily Srinivasan Jordan DPM   25 mg at
 19 0743 
   nortriptyline (PAMELOR) capsule 50 mg  50 mg Oral Nightly Michelle Awad MD   50 mg at 
 
19 220 
   ondansetron (ZOFRAN-ODT) disintegrating tablet 4 mg  4 mg Oral Q6H PRN Srinivasan Jordan
 DPM   4 mg at 19 0623 
  Or 
   ondansetron (ZOFRAN) injection 4 mg  4 mg Intravenous Q6H PRN Srinivasan Jordan, DPM     
   oxybutynin (DITROPAN) tablet 2.5 mg  2.5 mg Oral BID Srinivasan Jordan, DPM   2.5 mg at 0
19 0742 
   pantoprazole (PROTONIX) EC tablet 40 mg  40 mg Oral QAM AC Srinivasan Jordan, DPM   40 mg
 at 19 0548 
   polyethylene glycol (GLYCOLAX) packet 17 g  17 g Oral Daily PRN Srinivasan Jordan, DPM   
  
   prochlorperazine tablet 5 mg  5 mg Oral BID PRN Srinivasan Jordan, DPM     
   rOPINIRole (REQUIP) tablet 0.75 mg  0.75 mg Oral Nightly Srinivasan Jordan, DPM   0.75 mg
 at 19 2200 
   sodium chloride (PF) 0.9 % flush 10 mL  10 mL Intravenous Q8H PRN Srinivasan Jordan, DPM 
  10 mL at 18 0747 
   vancomycin (VANCOCIN) 750 mg/250 mL IVPB  750 mg Intravenous See Admin Instructions Rene
 LUCY Rosado [facility]     
 
 Allergies 
 Allergen Reactions 
   Quinapril Hcl Anaphylaxis 
   Digoxin And Related Other (See Comments) 
   toxic 
   Metaxalone Other (See Comments) 
   Morphine Mental Changes 
   Make her crazy/ "funny feeling in my head"
 Has used hydromorphone in-house 
   Pantoprazole Sodium Nausea and Vomiting 
   Penicillins Rash 
   Quinapril Hcl Edema 
   Facial Edema 
   Sudafed [Pseudoephedrine Hcl] Rash 
 
 Social History 
 
 Social History 
   Marital status:  
   Spouse name: N/A 
   Number of children: N/A 
   Years of education: N/A 
 
 Occupational History 
   Not on file. 
 
 Social History Main Topics 
   Smoking status: Former Smoker 
   Packs/day: 1.00 
   Years: 30.00 
   Types: Cigarettes 
   Quit date: 2004 
   Smokeless tobacco: Never Used 
    Comment: quite smoking  
   Alcohol use No 
   Drug use: No 
   Sexual activity: No 
 
 Other Topics Concern 
   Not on file 
 
 
 Social History Narrative 
  She lives with . 2 kids. Worked in banking 
 
 No smoking. No alcohol intake. Recreational Drug Use
 NO Travel
 mp Pets
 Immunization History 
 Administered Date(s) Administered 
   Hepatitis B Adult 2016 
   Influenza, Trivalent W/Preservative 2016 
   Pneumococcal Polysaccharide 23-valent 2012 
  
 
 Pneumococcal
 Influenza
 
 Family History 
 Problem Relation Age of Onset 
   High cholesterol Father  
   Hypertension Father  
   Diabetes Paternal Grandmother  
   Malig hypertherm Neg Hx  
   Kidney disease Neg Hx  
 
 REVIEW OF SYSTEMS
 
 General: The patient noted to have no  problem of fever,no  weight loss
  and no chills.
 
 Neurologic: Denies any headache, any lightheadedness. No loss of
 
 consciousness or seizure.
 
 Cardiac: with  No Chest Pain,with no  Palpitation,no  Dyspnea on Exertion
 
 Pulmonary: Denies cough, wheezing and hemoptysis
 
 GASTROINTESTINAL: with  nausea no vomiting, and diarrhea.
 GENITOURINARY: Noted no dysuria, urgency, or frequency.
 Hematological : Denies any ecchymosis, bleeding or hematuria
 
 Endocrine: Denies any polyphagia, polyuria or hot and cold intolerance
  Musculoskeletal: no new joint pain and swelling. 
 Skin : Denies any new rashes or cutaneous changes.
 
 Rest of the review of systems is unremarkable.
 
 PHYSICAL EXAMINATION
 
 VITAL SIGNS 
 Wt Readings from Last 3 Encounters: 
 18 68.7 kg (151 lb 7.3 oz) 
 18 65.8 kg (145 lb 1 oz) 
 18 65.9 kg (145 lb 4.5 oz) 
 
 Temp Readings from Last 3 Encounters: 
 19 98.5 F (36.9 C) (Oral) 
 18 98.2 F (36.8 C) (Oral) 
 18 97.9 F (36.6 C) (Oral) 
 
 
 BP Readings from Last 3 Encounters: 
 19 124/58 
 18 139/63 
 18 111/61 
 
 Pulse Readings from Last 3 Encounters: 
 19 75 
 18 74 
 18 85 
 
 Head:  Normocephalic atraumatic
 
 HEENT: Pink palpebral conjunctivae. Anicteric sclerae. No
 tonsillopharyngeal congestion.
 
 Neck : Noted no  Cervical Lymphadenopathy
 
 CHEST:Clear Breath Sounds Bilateral . 
 
 HEART: Regular rate and rhythm. No murmurs thrills or rubs
 
 ABDOMEN: Soft, flat. No tenderness. No hepatosplenomegaly.
 
 GROIN AREA: Negative  for intertrigo.
 
 EXTREMITIES: upper extremity no lesion s.
 Right lower extremities no edema. Left foot with dressing.
  
 
 NEUROLOGIC: No localizing signs.
 
 Skin : NO rash or ecchymosis.
 
 EEG results
 On 18
  
 This wake EEG is abnormal. Diffuse slowing is 
 suggestive of a diffuse encephalopathy of metabolic, degenerative 
 or vascular origin. No epileptiform activity was noted with body 
 jerking. 
 The absence of epileptiform discharge during the EEG recording 
 does not rule out the diagnosis of a seizure disorder. Clinical 
 correlation is recommended. 
 LABORATORY DATA
 
 Lab Results 
 Component Value Date 
  WBC 6.13 2019 
  HGB 8.7 (L) 2019 
  HCT 27.0 (L) 2019 
   2019 
  CHOL 101 2017 
  TRIG 132 2017 
  HDL 34 (L) 2017 
  ALT 17 2019 
  AST 24 2019 
   2019 
  K 4.8 2019 
  CL 98 (L) 2019 
  CREATININE 6.7 (H) 2019 
 
  BUN 29 (H) 2019 
  CO2 26 2019 
  TSH 1.14 2017 
  INR 1.0 2018 
  GLUF 105 (H) 2019 
  HGBA1C 7.5 (H) 2018 
 
 Hepatic Function Panel:  
 Lab Results 
 Component Value Date 
  PROT 6.4 2019 
  ALB 2.5 (L) 2019 
  BILITOT 0.4 2019 
  BILIDIR 0.1 2017 
  ALP 79 2019 
  AST 24 2019 
  ALT 17 2019 
  
 
 Lipase: 
 Lab Results 
 Component Value Date 
  LIPASE 332 2017 
  
 U/A:  
 Lab Results 
 Component Value Date 
  COLORU YELLOW 2011 
  CLARITYU Slightly Cloudy 10/16/2018 
  MEROPENEM 1.009 2013 
  LEUKOCYTESUR  10/16/2018 
    Comment: 
    small 
  NITRITE Negative 10/16/2018 
  UROBILINOGEN Normal 10/16/2018 
  UPRO  10/16/2018 
    Comment: 
    100 
  UPRO 100 2013 
  PHUR 8 10/16/2018 
  BLOODU Negative 10/16/2018 
  KETONES negative 10/16/2018 
  BILIRUBINUR Negative 10/16/2018 
  GLUCOSEU  10/16/2018 
    Comment: 
    moderate 
  
 DIAGNOSTIC IMAGING
 
 CAT scan 
 
 CT head without contrast [[id number]] Collected: 18 
 Order Status: Completed Updated: 18 
 Narrative:  
 CHERIE VILLALOBOS
 1950
 68 years Female
 CT HEAD WO CONTRAST
 2018 5:23 PM
 
 
 INDICATION: Altered mental status in a patient with acute osteomyelitis
 
 COMPARISON: Head CT, 2017
 
 TECHNIQUE: CT scan of the head without contrast. 5-mm axial noncontrast images were acqui
red from the foramen magnum through the cranial vertex. Multiplanar reformations were obtain
ed from the acquisition data.
 
 At least one of the following CT dose optimization techniques were used: Automated exposure
 control; Adjustment of mA and/or kV according to patient size; Use of iterative reconstruct
ion technique.
 
 FINDINGS:
 
 Brain: No intracranial hemorrhage, midline shift or pathologic mass effect. No cerebral kt
ma, mass lesion or evidence of acute infarct.
 
 Ventricles and extra-axial fluid spaces: Normal.
 
 Paranasal sinuses and mastoid air cells: Normal.
 
 Calvarium and extracranial soft tissues: Normal.
 
 Orbits: The patient is status post bilateral cataract surgery. 
 Impression:  
 1. No acute intracranial pathology. 
 
 Xr Chest 2 View
 
 Result Date: 2018
 1.  Mild diffuse pulmonary edema, similar to the prior exam. 2.  Left perihilar airspace op
acity which may represent edema. Recommend repeat chest x-ray in 6-8 weeks to verify resolut
ion. 3.  Small left pleural effusion and/or pleural scarring, with adjacent atelectasis or c
onsolidation, similar to the prior exam. Small right pleural effusion. 4.  Peripheral opacit
y within the left lower hemithorax, slightly increased which is questionable for a loculated
 pleural fluid, parenchymal opacity, or artifact. Electronically signed by Chau Garcia on 1
2018 5:33 PM
 
 X-ray Foot Left
 
 Result Date: 2018
 Amputation of the left forefoot at the level of the proximal metatarsals. Surgical cutaneou
s staples are present. No radiographic evidence for osteomyelitis. Normal alignment of the h
indfoot. Mild degeneration of the midfoot. Electronically signed by Oleksandr Lemus MD on 2018 10:12 AM
 
 Ct Head Without Contrast
 
 Result Date: 2018
 1.  No acute intracranial pathology. Electronically signed by Steven Samano MD on  5:28 PM
 
 Nm Myocardial Perfusion Spect (stress And Rest)
 
 Result Date: 2018
 1.  Mild to moderate left ventricular dilatation, unchanged between rest and stress. 2.  I 
cannot exclude mid anterior wall infarct. 3.  I see no evidence of ischemia. 4.  Global mode
rate hypokinesis. 5.  LVEF 29% stress, 33% rest. Using the risk stratification system at our
 institution, this examination equates to at least a high risk based on this imaging, arisin
g from the criteria below (1). Note that this should be interfaced with clinical and other d
 
patrizia, potentially affecting final categorization. American Heart Association and American Col
lege of Cardiology Scientific Statement. Circulation (2008); 118: p 7634-8575 Electronically
 signed by Wilfrido Knight MD on 2018 4:34 PM
 
 Ir Angiogram Extremity Left
 
 Result Date: 2018
 1. Aorta with narrow aortic bifurcation with moderate stenosis of proximal left common errol
c artery. 2. Left SFA with moderate stenosis proximally. 3. Single vessel runoff to left jeanne
t via left anterior tibial artery. 4. Left posterior tibial artery and peroneal artery were 
occluded with reconstitution of distal posterior tibial artery at the left ankle via collate
rals. 5. If forefoot amputation does not heal, may need popliteal to posterior tibial artery
 bypass versus antegrade access of left common femoral artery with posterior tibial artery r
ecanalization. Electronically signed by Kirt Mclaughlin MD on 2018 10:51 AM
 
 Cherie Villalobos is a 68 y.o. female with the following Problems 
 Patient Active Problem List 
 Diagnosis 
   Proteinuria 
   Vitamin D deficiency 
   Secondary hyperparathyroidism (HCC) 
   Recurrent UTI 
   Coronary artery disease involving native coronary artery of native heart without angina
 pectoris 
   Depression 
   ESRD (end stage renal disease) ([facility]) 
   Type 2 diabetes mellitus with chronic kidney disease on chronic dialysis, with long-ter
m current use of insulin (HCC) 
   Anemia in ESRD (end-stage renal disease) ([facility]) 
   Hypertension, essential 
   Anemia of chronic renal failure, stage 5 (HCC) 
   Dyslipidemia 
   Gastroesophageal reflux disease without esophagitis 
   Diabetic foot ulcer (HCC) 
   Loss of weight 
   Dysphagia, unspecified 
   Foot ulcer due to DM  ([facility]) 
   Severe protein-calorie malnutrition ([facility]) 
   Osteomyelitis of left foot ([facility]) 
   Pleural effusion 
   Prolonged Q-T interval on ECG 
   Chronic systolic congestive heart failure (HCC) 
   Chronic diarrhea 
   Chronic anticoagulation 
   Plantar ulcer (HCC) 
   Cardiomyopathy (HCC) 
   COPD (chronic obstructive pulmonary disease) ([facility]) 
   ICD (implantable cardioverter-defibrillator) in place 
   Implantable defibrillator reprogramming/check 
   Mixed hyperlipidemia 
   Moderate protein-calorie malnutrition (HCC) 
   Paroxysmal atrial fibrillation (HCC) 
   S/P CABG x 2 
   S/P mitral valve repair 
   Steroid-induced hyperglycemia 
   Stress hyperglycemia 
   Chronic multifocal osteomyelitis of left foot (HCC) 
   PVD (peripheral vascular disease) ([facility]) 
   CAD (coronary artery disease) 
 
 
 Plan:
 
 CBC CMP ESR CRP 
 q Monday 
 Vancomycin 500 mg iv and ceftazidime 2 gm after each hemodialysis
 Follow up in 2 weeks. 
 RECOMMENDATIONS
 continue with t discharge planning for iv vancomycin and ceftazidime for 6 weeks until   
 
 Discussed with Dr. Vincent hospitalist  who agreed and will proceed
 As plan.
 Thank you very much for the consult.
 ______________________
 
 ______________________
 MD Delmi FRANKLIN Puneet, MD - 2019  3:00 PM PSTLate entry:
 428/428-1  
    LOS: 4 days 
 She was admitted with plan for left TMA with Dr. Jordan which she had done 18.
 She was seen on dialysis with RNMarielena.
 She initially had complained of not feeling well earlier prior to starting dialysis. After 
initiation of dialysis she was hemodynamically stable with no tachycardia/hypotension but co
ntinued not feeling well. She was not nauseous or cramping. On tele she had SR but began to 
have few PVC's.
 BS on check was 106.
 At that time access was functioning well.
 Examination at that time was unremarkable with no new physical findings.
 UF was reduced and then discontinued and she was given albumin.
 Subsequently she began to have severe left sided chest pain and my RN called and dialysis w
as discontinued given concern with AMI.
 Troponin was ordered and EKG was planned.
 Plan of care was discussed with Dr. Almanzar.
 
 PMH, PSH, Social history reviewed in chart. Allergies and medications reviewed. Previous hi
story summarized as above. Prior lab data and imaging reviewed.Patient's old records and lab
s were reviewed in detail and summarized.
 
 ROS:
 Review of systems is as per history of present illness. Otherwise a 14 organ system review 
of systems was done and is negative.
 Us Lower Extremity Arterial Left 2018 showed Greater than 50% stenosis of the mid sup
erficial femoral artery. 2.  Monophasic single runoff to the ankle via the anterior tibial a
rtery.
 
 X-ray Foot Left 2018 showed amputation of the left forefoot at the level of the proxi
mal metatarsals. No radiographic evidence for osteomyelitis. Normal alignment of the hindfoo
t. Mild degeneration of the midfoot. 
 
 Assessment and Recommendations:
 
 1. ESRD on HD
 2. Left sided arm pain/feeling of impending doom
 3. Chronic systolic CHF
 4. Anemia chronic renal failure
 5. Chronic anticoagulation with Apixaban
 6. PAD s/p left TMA
 7. Paroxysmal Afib
 
 8. CAD s/p CABG
 
 Concern is with ACS.
 We will hold dialysis today.
 Plan for EKG/serial troponin.
 She is already on significant antiplatelet/anticoagulant Rx with ASA, Clopidogrel, Apixaban
 and is on BB (Carvedilol) and statin (atorvastatin) along with long acting nitrates.
 
  Will consider dialysis tomorrow if she is more hemodynamically stable and there is no AM
I.
  Cardiology evaluation.
 
 She should be on a 2 gm Na, 2 gm K, 1 gm PO4, 1 gm/kg protein diet.
 Please dose all medications for an eGFR of less than 15 ml/min/1.73 m2.
 NSAIDS/HOPPER 2 inhibitors should be avoided. Aluminum/magnesium containing antacids and magne
sium/phosphate containing enemas should be avoided.
 Fluid should be restricted to less than 1.5 L in a 24 hr period.
 Strict I/O should be done.
 Daily CMP should be done (including Mg, PO4).
 Plan of care was discussed with Dr. Vincent.
 
 MICHELLE AWAD MD
 2019
 
 Portions of my previous notes have been carried over for continuity of care.
 She was seen earlier in the day and charting was completed later after rounds.
 Dictation software, Dragon, was used which may contain error for similar sounding words. Pe
rsonal communication is requested for any clarification.
 Prognosis is guarded in view of multiple comorbid illnesses and ESRD including but not limi
ashly to death.
 
 Winston Vincent, DO - 2019 12:28 PM PSTFormatting of this note may be different from the 
original.
 [facility]
 Service:  Hospitalist
 Progress Note
 
 Pt: Cherie Villalobos  AGE/SEX: 68 y.o.   female      : 1950      
 MRN: [id number]          ROOM: [facility]/428-1   
 TODAY'S DATE: 2019
 
 Hospital Day:   LOS: 4 days 
 SUBJECTIVE
 Patient evaluated at bedside. Patient denies significant foot pain in the left foot. Patien
t having twitching movements with AMS. CT head negative for any acute findings. Patient slee
py today. 
 
 Scheduled Medications 
   apixaban  2.5 mg Oral BID 
   aspirin  81 mg Oral Daily with breakfast 
   atorvastatin  20 mg Oral Nightly 
   calcium acetate  667 mg Oral TID WC 
   carvedilol  3.125 mg Oral BID WC 
   cefTAZidime  2 g Intravenous After Hemodialysis (see admin instructions) 
   clopidogrel  75 mg Oral Daily 
   furosemide  20 mg Oral Daily 
   insulin glargine  20 Units Subcutaneous Nightly 
   insulin lispro (human)  0-10 Units Subcutaneous TID AC 
   insulin lispro (human)  0-5 Units Subcutaneous Nightly 
   isosorbide mononitrate  60 mg Oral Daily 
 
   losartan  100 mg Oral Daily 
   nortriptyline  25 mg Oral Daily 
   nortriptyline  75 mg Oral Nightly 
   oxybutynin  2.5 mg Oral BID 
   pantoprazole  40 mg Oral QAM AC 
   rOPINIRole  0.75 mg Oral Nightly 
   vancomycin (VANCOCIN) IVPB < 1 G  750 mg Intravenous Once 
   vancomycin  750 mg Intravenous See Admin Instructions 
 
 Continuous Infusions 
   dextrose   
 
 PRN Medications
 acetaminophen **OR** acetaminophen, albumin human, albuterol, dextrose, dextrose, dextrose,
 glucagon, glucagon, HYDROcodone-acetaminophen **OR** HYDROcodone-acetaminophen, loperamide,
 LORazepam, nitroGLYCERIN, ondansetron **OR** ondansetron, polyethylene glycol, prochlorpera
zine, saline lock IV - when tolerating PO fluids **AND** sodium chloride (PF)
 
 Allergy:
 Allergies 
 Allergen Reactions 
   Quinapril Hcl Anaphylaxis 
   Digoxin And Related Other (See Comments) 
   toxic 
   Metaxalone Other (See Comments) 
   Morphine Mental Changes 
   Make her crazy/ "funny feeling in my head"
 Has used hydromorphone in-house 
   Pantoprazole Sodium Nausea and Vomiting 
   Penicillins Rash 
   Quinapril Hcl Edema 
   Facial Edema 
   Sudafed [Pseudoephedrine Hcl] Rash 
 
 OBJECTIVE
 Vitals:
 Patient Vitals for the past 24 hrs:
  BP Temp Temp src Pulse Resp SpO2 
 19 1052 117/56 97.8 F (36.6 C) Oral 75 18 98 % 
 19 0729 138/61 97.8 F (36.6 C) Oral 87 18 94 % 
 19 0245 120/56 98.4 F (36.9 C) Oral 86 14 96 % 
 18 2307 105/54 97.6 F (36.4 C) Axillary 73 16 96 % 
 18 1900 116/85 99.8 F (37.7 C) Oral 93 16 97 % 
 18 1526 112/56 97.9 F (36.6 C) Oral 67 16 92 % 
 
 I&O Detailed Table: 
 
 Intake/Output Summary (Last 24 hours) at 19 1228
 Last data filed at 19 0809
  Gross per 24 hour 
 Intake              240 ml 
 Output              200 ml 
 Net               40 ml 
 
 Patient Vitals for the past 96 hrs:
  Weight 
 18 1549 68.7 kg (151 lb 7.3 oz) 
 
 Hemodynamics Last 24hrs:   
 
 
 Examination:
 
 Constitutional: Oriented to person, place, and time. appears well-developed and well-nouris
hed. 
 
 HEENT: 
 Head: Normocephalic and atraumatic. 
 Nose: Nose normal. 
 Mouth/Throat: Oropharynx is clear and moist.
 Eyes: Conjunctivae and EOM are normal. Pupils are equal, round, and reactive to light. Righ
t eye exhibits no discharge. Left eye exhibits no discharge. No scleral icterus. 
 Neck: Normal range of motion. Neck supple. No JVD present. No tracheal deviation present. N
o thyromegaly present. no cervical adenopathy.
 
 Cardiovascular: Normal rate, regular rhythm, normal heart sounds with S1 and S2, and intact
 distal pulses.  Exam reveals no gallop and no friction rub.  No murmur heard.
 
 Pulmonary/Chest: Effort normal and breath sounds normal. No stridor. No respiratory distres
s.  no wheezes. no rales. exhibits no tenderness. 
 
 Abdominal: Soft. Bowel sounds are normal. exhibits no distension and no mass. There is no t
enderness. There is no rebound and no guarding. 
 
 Extremities/Musculoskeletal: Normal range of motion.exhibits no tenderness.  exhibits no ed
ema. Left leg bandage in place. 
   
 Neurological:  Alert and oriented to person, place, and time. Has normal reflexes.  display
s normal reflexes. No cranial nerve deficit.  Exhibits normal muscle tone. Coordination norm
al. Exhibits twitching 
 
 Skin: Skin is warm and dry. No rash noted. No erythema. No pallor. 
 
 Psychiatric: Has a normal mood and affect. Behavior is normal. Judgment normal. 
 
 LABS:
 WBC 
 Date Value Ref Range Status 
 2019 5.33 3.80 - 11.00 K/uL Final 
 2019 5.33 3.80 - 11.00 K/uL Final 
 
 RBC 
 Date Value Ref Range Status 
 2019 2.66 (L) 3.70 - 5.10 M/uL Final 
 2019 2.66 (L) 3.70 - 5.10 M/uL Final 
 
 HGB 
 Date Value Ref Range Status 
 2019 8.9 (L) 11.3 - 15.5 g/dL Final 
 2019 8.9 (L) 11.3 - 15.5 g/dL Final 
 
 HCT 
 Date Value Ref Range Status 
 2019 27.6 (L) 34.0 - 46.0 % Final 
 2019 27.6 (L) 34.0 - 46.0 % Final 
 
 MCV 
 Date Value Ref Range Status 
 2019 103.8 (H) 80.0 - 100.0 fl Final 
 2019 103.8 (H) 80.0 - 100.0 fl Final 
 
 
 MCH 
 Date Value Ref Range Status 
 2019 33.3 27.0 - 34.0 pg Final 
 2019 33.3 27.0 - 34.0 pg Final 
 
 MCHC 
 Date Value Ref Range Status 
 2019 32.1 32.0 - 35.5 g/dL Final 
 2019 32.1 32.0 - 35.5 g/dL Final 
 
 RDW SD 
 Date Value Ref Range Status 
 2019 61.3 (H) 37 - 53 fl Final 
 2019 61.3 (H) 37 - 53 fl Final 
 
 PLT 
 Date Value Ref Range Status 
 2019 151 150 - 400 K/uL Final 
 2019 151 150 - 400 K/uL Final 
 
 MPV 
 Date Value Ref Range Status 
 2019 9.3 fl Final 
 2019 9.3 fl Final 
 
 NEUTROPHILS ABS 
 Date Value Ref Range Status 
 2019 4.28 1.90 - 7.40 K/uL Final 
 
 LYMPHOCYTES ABS 
 Date Value Ref Range Status 
 2019 0.57 (L) 1.00 - 3.90 K/uL Final 
 
 EOSINOPHILS ABS 
 Date Value Ref Range Status 
 2019 0.00 0.00 - 0.50 K/uL Final 
 
 BASOPHILS ABS 
 Date Value Ref Range Status 
 2019 0.02 0.00 - 0.10 K/uL Final 
   Comment: 
   Testing performed at [facility], [address] [place] [address]  [id number] 
 
 Neutrophils Manual 
 Date Value Ref Range Status 
 2019 86 % Final 
 
 Lymphocytes Manual 
 Date Value Ref Range Status 
 2019 10 % Final 
 
 Monocytes Manual 
 Date Value Ref Range Status 
 2019 4 % Final 
 
 MORPHOLOGY 
 Date Value Ref Range Status 
 2019 1+  Final 
   Comment: 
   ANISO
 
 1+
 MACRO
 NORMAL PLT MORPH
 Testing performed at [facility], [address] [place] [address]  [id number]
  
 
 GLUCOSE 
 Date Value Ref Range Status 
 2019 166 (H) 65 - 99 mg/dL Final 
 
 BUN 
 Date Value Ref Range Status 
 2019 45 (H) 8 - 25 mg/dL Final 
 
 CREATININE 
 Date Value Ref Range Status 
 2019 8.1 (H) 0.50 - 1.00 mg/dL Final 
 
 BUN/CREAT 
 Date Value Ref Range Status 
 2019 6  Final 
 
 TOTAL PROTEIN 
 Date Value Ref Range Status 
 2019 6.0 (L) 6.3 - 8.2 g/dL Final 
 
 GLOBULIN 
 Date Value Ref Range Status 
 2019 3.7 1.3 - 4.9 g/dL Final 
 
 TBIL 
 Date Value Ref Range Status 
 2019 0.4 0.1 - 1.5 mg/dL Final 
 
 ALT 
 Date Value Ref Range Status 
 2019 16 10 - 65 U/L Final 
 
 AST 
 Date Value Ref Range Status 
 2019 21 10 - 45 U/L Final 
 
 SODIUM 
 Date Value Ref Range Status 
 2019 135 135 - 145 mmol/L Final 
 
 POTASSIUM 
 Date Value Ref Range Status 
 2019 4.8 3.5 - 4.9 mmol/L Final 
 
 CHLORIDE 
 Date Value Ref Range Status 
 2019 97 (L) 99 - 109 mmol/L Final 
 
 CO2 
 Date Value Ref Range Status 
 2019 25 23 - 32 mmol/L Final 
 
 ANION GAP AGAP 
 Date Value Ref Range Status 
 
 2019 18 5 - 20 mmol/L Final 
 
 [unfilled]
 HDL CHOL 
 Date Value Ref Range Status 
 2017 34 (L) >40 mg/dL Final 
 
 CHOLESTEROL 
 Date Value Ref Range Status 
 2017 101 <200 mg/dL Final 
 
 TSH 
 Date Value Ref Range Status 
 2017 1.14 0.45 - 5.10 uIU/mL Final 
   Comment: 
   Testing performed at [facility];[address] [place] [address];[place] [address] 
 
 TOTAL PROTEIN 
 Date Value Ref Range Status 
 2019 6.0 (L) 6.3 - 8.2 g/dL Final 
 
 TBIL 
 Date Value Ref Range Status 
 2019 0.4 0.1 - 1.5 mg/dL Final 
 
 BILI, DIRECT 
 Date Value Ref Range Status 
 2017 0.1 0.0 - 0.3 mg/dL Final 
 
 AST 
 Date Value Ref Range Status 
 2019 21 10 - 45 U/L Final 
 
 ALT 
 Date Value Ref Range Status 
 2019 16 10 - 65 U/L Final 
 
 Diagnostic:
 Xr Chest 2 View
 
 Result Date: 2018
 CHERIE VILLALOBOS 1950 68 years Female XR CHEST 2 VIEW FRONTAL AND LATERAL 2018
 5:27 PM INDICATION: Chest pain COMPARISON: 2018, CT chest 2017 TECHNIQUE: Two vie
w chest, PA and lateral views FINDINGS: The heart is stable in size. Patient is post median 
sternotomy. Right cardiac ICD in situ. Mild diffuse coarsening of lung markings. Suspect mil
d underlying interstitial edema. 1.6 cm perihilar opacity noted on the left. Blunting of the
 left costophrenic angle which may be secondary to effusion and/or scarring. Small right ple
ural effusion. Peripheral opacity along the lower lateral hemithorax, slightly increased que
stionable for artifact or loculated pleural fluid. Atelectasis or consolidation within the l
eft lung base, similar to the prior exam. 
 
 1.  Mild diffuse pulmonary edema, similar to the prior exam. 2.  Left perihilar airspace op
acity which may represent edema. Recommend repeat chest x-ray in 6-8 weeks to verify resolut
ion. 3.  Small left pleural effusion and/or pleural scarring, with adjacent atelectasis or c
onsolidation, similar to the prior exam. Small right pleural effusion. 4.  Peripheral opacit
y within the left lower hemithorax, slightly increased which is questionable for a loculated
 pleural fluid, parenchymal opacity, or artifact. Electronically signed by Chau Garcia on 1
2018 5:33 PM
 
 X-ray Foot Left
 
 
 Result Date: 2018
 CHERIE VILLALOBOS 1950 XR FOOT LEFT 2018 9:54 AM INDICATION: Osteomyelitis of 
the left foot. COMPARISON: 2018 TECHNIQUE: Left foot series, 3 views, PA, obliq
ue and lateral views 
 
 Amputation of the left forefoot at the level of the proximal metatarsals. Surgical cutaneou
s staples are present. No radiographic evidence for osteomyelitis. Normal alignment of the h
indfoot. Mild degeneration of the midfoot. Electronically signed by Oleksandr Lemus MD on 2018 10:12 AM
 
 Nm Myocardial Perfusion Spect (stress And Rest)
 
 Result Date: 2018
 HISTORY: Chest pain. Peripheral vascular disease. Multiple coronary stents. Multiple previo
us myocardial infarctions. CABG. TECHNIQUE: The patient was intravenously injected with 10.5
 millicuries technetium 99m sestamibi. Rest gated supine SPECT images were obtained.  The pa
tient was then intravenously injected with 0.4 mg Regadenason per protocol.    The patient w
as finally intravenously injected with 30.1 mCi technetium 99m sestamibi, and stress gated s
upine SPECT, and prone SPECT scintigraphic images were obtained. COMPARISON: Echocardiogram 
18. Patient was injected on 17 for a rest SPECT study, but no images were perfor
med. Coronary arteriography 18. FINDINGS: Moderate left ventricular dilatation, unchan
ged between rest and stress. Thinning of the proximal inferior wall radiating minimally into
 the lateral wall. This is unchanged between rest and stress supine images. Stress prone lanre
ges show resolution of this finding, favoring artifact. There is mild thinning of the mid an
terior wall slightly radiating into the septum, unchanged between rest and stress. I cannot 
exclude infarct. LVEF measures 29% stress, 33% rest. Moderate global hypokinesis. Rest EDV 1
70 mL. Rest  mL. Stress  mL. Stress  mL. Transient ischemic dilatation 
ratio 1.06, normal. 
 
 1.  Mild to moderate left ventricular dilatation, unchanged between rest and stress. 2.  I 
cannot exclude mid anterior wall infarct. 3.  I see no evidence of ischemia. 4.  Global mode
rate hypokinesis. 5.  LVEF 29% stress, 33% rest. Using the risk stratification system at our
 institution, this examination equates to at least a high risk based on this imaging, arisin
g from the criteria below (1). Note that this should be interfaced with clinical and other d
patrizia, potentially affecting final categorization. American Heart Association and American Col
lege of Cardiology Scientific Statement. Circulation (2008); 118: p 3797-2916 Electronically
 signed by Wilfrido nKight MD on 2018 4:34 PM
 
 Ir Angiogram Extremity Left
 
 Result Date: 2018
 LOWER EXTREMITY ARTERIOGRAM, UNILATERAL PREOPERATIVE DIAGNOSIS:  Critical limb ischemia and
 need for left forefoot amputation POSTOPERATIVE DIAGNOSIS:  Same PROCEDURE: 1. Moderate con
scious sedation 2. Ultrasound guided access of the right common femoral artery 3. Aortogram 
4. Selective left common femoral artery catheterization with left leg runoff 5. Left common 
iliac artery angioplasty using 6 x 40 mm angioplasty balloon 6. Drug eluting balloon angiopl
asty of left SFA using 4 x 100 mm Lutonix balloon SURGEON: Kirt Mclaughlin MD ASSISTANT: None ANEST
HESIA: Moderate sedation and local anesthesia Informed consent was obtained from the patient
. Continuous cardiac monitoring was performed throughout the procedure. Conscious sedation w
as provided by the nursing staff during the procedure under my supervision. Sedation time: 5
6 minutes Medications: 1 mg Versed IV, 100 mcg Fentanyl IV ESTIMATED BLOOD LOSS: Minimal CON
TRAST:  55 mL of Isovue 250 INDICATIONS:  See preoperative history and physical FINDINGS:  A
jabier with narrow aortic bifurcation. Moderate stenosis of proximal left common iliac artery 
making passing sheath difficult. Left SFA with moderate stenosis proximally. Single vessel r
unoff seen via left anterior tibial artery to foot. The posterior tibial artery does reconst
itute in left ankle via collaterals. Unable to pass sheath over aortic bifurcation due to il
iac disease and small arteries. After angioplasty, moderate angiographic results. Should be 
able to heal left forefoot amputation. If amputation does not heal, may need popliteal to po
sterior tibial artery bypass versus antegrade access of left common femoral artery with post
 
erior tibial artery recanalization. DESCRIPTION OF PROCEDURE:  The patient was properly iden
tified and brought to the Cath Lab. The patient was placed supine on the catheter table and 
patient was given moderate sedation by nursing staff under my supervision. Patient's bilater
al groins were then prepped and draped in the usual sterile fashion. Local anesthetic was gi
fidelia to the right groin and the right common femoral artery was accessed under ultrasound oksana
dance using a micropuncture needle. A micropuncture sheath was inserted over a wire and up s
ized to a 4 French sheath. A Omni flush catheter was then inserted into the distal aorta and
 aortogram was then performed with the findings as described above. The Omni Flush catheter 
was then used to guide a Glidewire into the left common femoral artery and then the catheter
 was then advanced over the wire. A left lower extremity runoff was then performed with the 
findings as described above. Next, an Amplatzer wire was then inserted over the catheter and
 the 4 French sheath was removed. A 6 French destination sheath was then inserted over the w
antione with the tip of the sheath being placed into the proximal left common iliac artery. The 
sheath would not pass through the diseased left common iliac artery. Angioplasty of the left
 common iliac artery was then performed using 6 x 40 mm angioplasty balloon. However, the sh
eath with still not pass after angioplasty. A vertebral catheter was inserted over the wire 
and the wire was then removed. A Glidewire was then placed into the vertebral catheter and u
sed to cross through the stenotic left superficial femoral artery.  Next, a 260 fix core wir
e was then inserted over the catheter.  Drug eluting balloon angioplasty of the left SFA was
 then performed using 4 x 100 mm Lutonix balloon. Unable to cross left posterior tibial britt
ry occlusion due to tip is sheath only being in left common iliac artery. A final arteriogra
m was then performed through the sheath. The destination sheath was then removed and a Mynx 
closure device was then used on the right common femoral artery and hemostasis was ensured. 
The patient was then taken to the observation unit for further monitoring. 
 
 1. Aorta with narrow aortic bifurcation with moderate stenosis of proximal left common errol
c artery. 2. Left SFA with moderate stenosis proximally. 3. Single vessel runoff to left jeanne
t via left anterior tibial artery. 4. Left posterior tibial artery and peroneal artery were 
occluded with reconstitution of distal posterior tibial artery at the left ankle via collate
rals. 5. If forefoot amputation does not heal, may need popliteal to posterior tibial artery
 bypass versus antegrade access of left common femoral artery with posterior tibial artery r
ecanalization. Electronically signed by Kirt Mclaughlin MD on 2018 10:51 AM
 
 Echo Cardiac Adult With Contrast
 
 Result Date: 2018
 Patient Name:  CHERIE VILLALOBOS YOB: 1950 Accession:  [id number] Performing Phys
ician:   Duglas Ornelas MD _______________________________________________________________ IND
ICATIONS ----------- SHORTNESS OF BREATH, DEFINITY WAS USED/SIGNIFICANT PATIENT REACTION FLA
NK PAIN CONCLUSIONS ----------- 1. Overall left ventricular systolic function is severely im
paired with, an EF between 20 - 25 %. 2. There is mild aortic regurgitation. 3. There is mil
d to  moderate aortic stenosis present. 4. Mild-to-moderate mitral regurgitation is present.
 5. There is moderate pulmonary hypertension. FINDINGS -------- Study: A 2-dimensional trans
thoracic echocardiogram with m-mode, spectral and color flow Doppler was perfomed. Study: Th
is was a technically adequate study. Left Ventricle: Overall left ventricular systolic funct
ion is severely impaired with, an EF between 20 - 25 %. Left Ventricle: The left ventricle c
avity size is normal. Left Ventricle: Left ventricular wall thickness is normal. Right Ventr
icle: The right ventricle is mildly enlarged measuring between 3.4 - 3.7 cm. Right Ventricle
: The right ventricular systolic function is at the low end of normal. Right Ventricle: Pace
r/ICD wire seen. Left Atrium: The left atrial size is normal Left Atrium: , and the LA measu
res 4.6cm. Right Atrium: The right atrium is normal in size. Right Atrium: Pacemaker wire se
en in the right atrial cavity. Aortic Valve: The aortic valve is trileaflet. Aortic Valve: T
he aortic valve is mildly calcified. Aortic Valve: There is mild aortic regurgitation. Aorti
c Valve: There is moderate aortic stenosis present. Mitral Valve: Mild-to-moderate mitral re
gurgitation is present. Mitral Valve: Mild mitral annular calcification present. Tricuspid V
alve: The tricuspid valve appears structurally normal. Tricuspid Valve: Mild tricuspid regur
gitation present. Tricuspid Valve: There is moderate pulmonary hypertension. Tricuspid Valve
: The right ventricular systolic pressure (pulmonary artery systolic pressure), as measured 
by Doppler, is 55.99mmHg. Pulmonic Valve: The pulmonic valve was not well visualized. Pulmon
ic Valve: Mild pulmonic regurgitation. Pericardium: There is no pericardial effusion. IVC/He
 
patic Veins: The IVC is normal size (1.5-2.5cm) and collapses >50% with sniff, consistent wi
 central venous pressures of 5-10mmHg. Contrast: Poor visualization. Definity was used to 
opacify the left ventricular chamber and improve delineation of the endocardial border. DIANE
UREMENTS ------------- CHEIKH Planimetry:   2.21 cm2 AVAI Planimetry:   0.00 cm2/m2 RA Area:   
13.84 cm2 IVC:   1.61 cm LA Major:   5.27 cm EDV(Teich):   121.12 ml IVSd:   0.82 cm LVIDd: 
  5.05 cm LVPWd:   0.88 cm LVOT Diam:   1.87 cm %FS:   9.90 % EF(Teich):   21.60 % ESV(Teich
):   94.95 ml IVSs:   0.96 cm LVIDs:   4.55 cm LVPWs:   1.05 cm SV(Teich):   26.17 ml RA Adan
or:   4.85 cm RVIDd:   3.62 cm LVEF MOD A2C:   23.68 % SV MOD A2C:   32.38 ml LVEF MOD A4C: 
  21.82 % SV MOD A4C:   27.33 ml EF Biplane:   22.47 % LVEDV MOD BP:   131.72 ml LVESV MOD B
P:   102.11 ml LVEDV MOD A2C:   136.73 ml LVLd A2C:   8.73 cm LVEDV MOD A4C:   125.25 ml LVL
d A4C:   8.87 cm LVESV MOD A2C:   104.34 ml LVLs A2C:   8.34 cm LVESV MOD A4C:   97.91 ml LV
Ls A4C:   8.60 cm LAESV(A-L):   48.15 ml LAESV Index (A-L):   26.17 ml/m2 LAAs A2C:   16.24 
cm2 LAESV A-L A2C:   47.34 ml LALs A2C:   4.73 cm LAAs A4C:   16.52 cm2 LAESV A-L A4C:   47.
99 ml LALs A4C:   4.83 cm Ao Diam:   2.59 cm AV Cusp:   1.65 cm LA Diam:   4.60 cm LA/Ao:   
1.77 TAPSE:   1.57 cm IVC diameter:   1.61 cm IVC collapse:   0.68 cm IVC % collapse:   56.1
6 % AR Dec Lipscomb:   3.95 m/s2 AR Dec Time:   955.07 ms AR maxP.06 mmHg AR PHT:   276.
97 ms AR Vmax:   3.77 m/s HR:   68.91 BPM AV maxP.56 mmHg AV meanPG:   10.23 mmHg AV 
Vmax:   2.21 m/s AV Vmean:   1.53 m/s AV VTI:   47.77 cm CHEIKH Vmax:   1.03 cm2 CHEIKH (VTI):   0
.99 cm2 AVAI Vmax:   0.00 cm2/m2 AVAI (VTI):   0.00 cm2/m2 LVCI Dopp:   1.80 l/minm2 LVCO Do
pp:   3.32 l/min HR:   70.30 BPM LVOT maxP.71 mmHg LVOT meanP.55 mmHg LVSI Dopp:
   25.70 ml/m2 LVSV Dopp:   47.30 ml LVOT Vmax:   0.82 m/s LVOT Vmean:   0.58 m/s LVOT VTI: 
  17.07 cm MCO:   459.56 ms MV A Hiram:   0.83 m/s MV DecT:   232.85 ms MV E Hiram:   1.70 m/s M
V E/A Ratio:   2.05 MV PHT:   80.47 ms MVA By PHT:   2.73 cm2 MV A Dur:   82.77 ms MV maxP.14 mmHg MV meanP.79 mmHg MV Vmax:   1.74 m/s MV Vmean:   1.01 m/s MV VTI:   45.3
3 cm MVA (VTI):   1.04 cm2 Septal e':   0.04 m/s Septal E/e':   38.01 Lateral e':   0.08 m/s
 Lateral E/e':   21.28 P Vein D:   0.84 m/s P Vein S/D Ratio:   0.56 P Vein S:   0.47 m/s PA
EDP:   8.38 mmHg PRend P.38 mmHg PRend Vmax:   1.16 m/s HR:   69.37 BPM PV maxP.
87 mmHg PV meanP.11 mmHg PV Vmax:   0.68 m/s PV Vmean:   0.50 m/s PV VTI:   15.15 cm R
AP:   3 mmHg RV S':   0.08 m/s RVSP:   55.99 mmHg TR maxP.99 mmHg TR Vmax:   3.63 m/s
 TV A Hiram:   0.44 m/s TV Dec Lipscomb:   6.11 m/s2 TV Dec Time:   133.74 ms TV E Hiram:   0.81 m/
s TV E/A Ratio:   1.81 Sonographer: CEE Authenticated by: Duglas Ornelas MD Report Date/Time: 
2018 17:39:35 
 
 1. Overall left ventricular systolic function is severely impaired with, an EF between 20 -
 25 %. 2. There is mild aortic regurgitation. 3. There is mild to  moderate aortic stenosis 
present. 4. Mild-to-moderate mitral regurgitation is present. 5. There is moderate pulmonary
 hypertension.
 
 Ir Guidance Vascular Access Us
 
 Result Date: 2018
 This Point of Care (POC) ultrasound image has been reviewed and interpreted by the physicia
n identified as the performing physician in the associated interpretation and report. 
 
 PROBLEM LIST
 Principal Problem:
   Osteomyelitis of left foot (HCC)
 Active Problems:
   Secondary hyperparathyroidism (HCC)
   Depression
   ESRD (end stage renal disease) (HCC)
   Type 2 diabetes mellitus with chronic kidney disease on chronic dialysis, with long-term 
current use of insulin (HCC)
   Anemia in ESRD (end-stage renal disease) (HCC)
   Hypertension, essential
   Anemia of chronic renal failure, stage 5 (HCC)
   Diabetic foot ulcer (HCC)
   Chronic systolic congestive heart failure (HCC)
   Chronic anticoagulation
   Cardiomyopathy (HCC)
 
   Paroxysmal atrial fibrillation (HCC)
   S/P CABG x 2
   S/P mitral valve repair
   PVD (peripheral vascular disease) (HCC)
   CAD (coronary artery disease)
 
 ASSESSMENT & PLAN
 
 Osteomyelitis of left foot SP left TMA by Dr. Jordan on 18. Bone culture grew normal
 skin marzena. However bone pathology is pending. Patient is receiving cefepime with each HD p
er nephrology and ID recommendations. 
 - Dr. Jordan is following patient in the hospital. 
 - ID consult from Dr. Elliott appreciated. Will follow recommendations regarding use of future
 antibiotic.  
 - Continue vanc and ceftazidime 
 
 Phantom pain.Resolved.
 
 Twitching with AMS - CT head negative. Possibly due to ESRD, patient Creatinine elevated  -
 to get dialysis 
 
 ESRD. On HD every T-T-S. Last HD yesterday. Appreciate help from nephrology services. 
 
 Type II DM with complications. Most recent blood sugars have been in acceptable range . She
 is on Lantus and SSI. Last glycohemoglobin A1c was 7.5 on 18. Will continue current r
egimen of Lantus and Humalog. 
 
 CAD SP CABG, paroxysmal atrial fibrillation, HFrEF. All cardiac problems are under control.
 Will continue apixaban, carvedilol, aspirin, Plavix, losartan and Imdur. 
 
 History of PAD. SP left leg angioplasty. On Plavix and aspirin. 
 
 Anemia of chronic kidney disease. Management per nephrology services. 
 
 Disposition: SN[facility] george Vincent DO
 2019 12:28 PM
 
 Addendum:
 
 During dialysis patient started to develop left arm pain. Patient denies any SOB, n/v or ov
ert chest pain, however she says she feels Off. Stat troponin was obtained and found to be p
ositive at 0.318. EKG shows LBB, similar to prior. Patient was given nitroglycerin after whi
ch pain abated. ON evaluation patient was AAOx3 NAD,. Chest pain free. CXR showed small locu
lated pleural fluid collection seen overlying lateral left heart boarded in LL chest with sm
all pleural effusion.
 
 Assessment
 Chest pain, looks to be cardiac in nature
 
 Plan
 - ASA now
 - Nitro PRN
 - Discussed Case with Dr. Luz, she agrees to see patient. Appreciate recommendations. 
 - Continue telemetry
 - Monitor troponin Q6h
 - Andrés Ying MD - 2019  9:39 AM PSTFormatting of this note may be different from
 the original.
 
 
 
 CONSULT NOTE
 2019
 9:39 AM
 
 PRIMARY PHYSICIAN
 Ivy Couch
 
 CONSULT REQUEST FROM
 Winston Vincent DO
 
 HISTORY OF PRESENT ILLNESS
 The patient is a 68 y.o.-year-old female  with history of ESRD on HD via fistula Tue/Thu/Sa
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
 CABG. Recent LLE angioplasty few weeks ago. She had been on  antibiotics for L foot first d
igit infection with possible osteomyelitis since November (Oral levofloxacin and clindamycin
 then transitioned to IV cefepime with HD until 18, no improvement noted on antibiotics
). She had followed with podiatrist and underwent L TMA on 18, intraoperative findings
 encountered purulence at first metatarsal. 
 Admitted on 18 to [facility]. 
 Wound culture shows skin marzena.
 DAy 5 of iv vancomycin and  Day 1 of ceftazidime
 Had 4 days of cefepime 
 Will change to iv vancmycin 500 mg iv and ceftazidime 2 gm iv after each hemodialysis unitl
until 18 for the infected foot.  
 The patient has problem with tremors on the hands  And on and off confusion. 
 Ct head negative on 18
 Will have EEG done. 
 Past Medical History 
 Diagnosis Date 
   Acute MI (HCC)  
  with stenting x 1 
   Anemia associated with chronic renal failure 2016 
   Atrial fibrillation (HCC)  
   Chronic kidney disease  
   Congestive heart failure (HCC)  
   COPD (chronic obstructive pulmonary disease) (HCC)  
   COPD (chronic obstructive pulmonary disease) ([facility]) 2018 
   Coronary artery disease  
  stent placements: 3 total 
   Depression  
   Diabetes other 
  abstracted 
   Diabetes mellitus, type 2 ([facility])  
   ESRD on peritoneal dialysis ([facility])  
   GERD (gastroesophageal reflux disease)  
   Hemodialysis patient ([facility]) 10/2016 
  Stopped peritoneal and restarted hemodialysis 
   Hemorrhage of gastrointestinal tract, unspecified 2017 
  low GI, rectal bleeding 
   High blood pressure other 
  abstracted 
   High cholesterol other 
  abstracted 
   Hyperlipidemia  
   Hypertension  
   Hyponatremia 2017 
   Myocardial infarction ([facility]) 2017 
   Other chronic pain  
  feet,  
 
   Pain of left hip joint 2016 
  due to hip fracture and replacement 
   Partial small bowel obstruction ([facility]) 2017 
  resolved 
   Renal failure  
  Stage 5.   Fistula in the JAN - not on dialysis yet. 
   Unspecified visual disturbance  
  glasses 
 
 Past Surgical History 
 Procedure Laterality Date 
   AV FISTULA PLACEMENT   
  left upper arm AV fistula - failed 
   AV FISTULA REPAIR N/A 10/25/2016 
  Procedure: AV FISTULA - GRAFT REPAIR/REVISION;  Surgeon: Kirt Mclaughlin MD;  Location: [facility]
IN OR;  Service: Vascular;  Laterality: N/A;  Superficialization and revision of left arm fi
stula 
   CARDIAC CATHETERIZATION  2017 
   CARPAL TUNNEL RELEASE Bilateral other 
  abstracted 
   CATARACT EXTRACTION Bilateral 2007 
   CATHETER REMOVAL N/A 2016 
  Procedure: DIALYSIS CATHETER - REMOVAL;  Surgeon: Kirt Mclaughlin MD;  Location: [facility] MAIN OR; 
 Service: Vascular;  Laterality: N/A;  PD cath removal 
   CHOLECYSTECTOMY  other 
  abstracted 
   COLONOSCOPY   
   CORONARY ARTERY BYPASS GRAFT   
   CORONARY STENT PLACEMENT  2017 
  Drug Elutin stents to OM, 1 stent to prox. LAD 
   EYE SURGERY   
   FOOT AMPUTATION Left 2018 
  Procedure: FOREFOOT - AMPUTATION;  Surgeon: Srinivasan Jordan DPM;  Location: [facility] MAIN OR;
  Service: Podiatry;  Laterality: Left; 
   HARDWARE PRESENT   
  stent placements x 3, Left hip replacement, vascular stents 2 in left leg 
   HIP ARTHROPLASTY Left 2016 
  Procedure: HIP - ARTHROPLASTY(ALLISON);  Surgeon: Uriel Roa MD;  Location: [facility] MAIN 
OR;  Service: Orthopedics;  Laterality: Left; 
   HYSTERECTOMY  1998 
  complete 
   PACEMAKER INSERTION   
  [employer] pacemaker/defib 
   PERITONEAL CATHETER INSERTION  8/15/2013 
   PERITONEAL CATHETER INSERTION N/A 2016 
  Procedure: LAPAROSCOPIC - PERITONEAL DIALYSIS CATH INSERTION;  Surgeon: Kirt Mclaughlin MD;  Lo
cation: [facility] MAIN OR;  Service: Vascular;  Laterality: N/A;  Removal of old catheter 
   SHOULDER SURGERY Right  
  frozen shoulder 
   UNLISTED PROCEDURE ARTHROSCOPY   
  total Left hip 
   vascular stents Left 2017 
  leg (2 stents) 
   VASCULAR SURGERY   
 
 Current Facility-Administered Medications 
 Medication Dose Route Frequency Provider Last Rate Last Dose 
   acetaminophen (TYLENOL) tablet 650 mg  650 mg Oral Q6H PRN Srinivasan Jordan DPM     
  Or 
   acetaminophen (TYLENOL) suppository 650 mg  650 mg Rectal Q6H PRN Srinivasan Jordan DPM 
 
    
   albumin human 25 % solution 12.5 g  12.5 g Intravenous Q1H PRN Michelle Awad MD   12.5
 g at 18 1041 
   albuterol (PROVENTIL HFA;VENTOLIN HFA) inhaler  2 puff Inhalation Q4H PRN Srinivasan Dhillon
an, DPM     
   apixaban (ELIQUIS) tablet 2.5 mg  2.5 mg Oral BID GELACIO TerryM   2.5 mg at  0824 
   aspirin EC tablet 81 mg  81 mg Oral Daily with breakfast Srinivasan Jordan DPM   81 mg a
t 19 0824 
   atorvastatin (LIPITOR) tablet 20 mg  20 mg Oral Nightly Srinivasan Jordan DPM   20 mg at
 18 
   calcium acetate (PHOSLO) capsule 667 mg  667 mg Oral TID  Srinivasan Jordan DPM   667 
mg at 19 0825 
   carvedilol (COREG) tablet 3.125 mg  3.125 mg Oral BID  Srinivasan Jordan DPM   3.125 m
g at 19 0825 
   cefTAZidime (FORTAZ) 2 g in sodium chloride (IV) 0.9 % 50 mL IVPB  2 g Intravenous Afte
r Hemodialysis (see admin instructions) Andrés Elliott MD     
   clopidogrel (PLAVIX) tablet 75 mg  75 mg Oral Daily Srinivasan Jordan DPM   75 mg at  0824 
   dextrose 10 % infusion   Intravenous Continuous PRN Srinivasan Jordan DPM     
   dextrose 50 % solution 12 mL  12 mL Intravenous PRN Srinivasan Jordan DPM     
   dextrose 50 % solution 25 mL  25 mL Intravenous PRN Srinivasan APONTE Julian, DPM     
   furosemide (LASIX) tablet 20 mg  20 mg Oral Daily Srinivasan L Julian, DPM   20 mg at  0824 
   glucagon (GLUCAGEN) injection 0.5 mg  0.5 mg Intramuscular PRN Srinivasan L Julian, DPM    
 
   glucagon (GLUCAGEN) injection 1 mg  1 mg Intramuscular PRN Srinivasan L Julian, DPM     
   HYDROcodone-acetaminophen (NORCO) 5-325 MG per tablet 1 tablet  1 tablet Oral Q4H PRN B
axel L Julian, DPM   1 tablet at 184 
  Or 
   HYDROcodone-acetaminophen (NORCO)  MG per tablet 1 tablet  1 tablet Oral Q4H PRN 
Srinivasanmario Jordan, DPM   1 tablet at 18 1446 
   insulin glargine (LANTUS) injection 20 Units  20 Units Subcutaneous Nightly Augustin Bourne MD   20 Units at 18 
   insulin lispro (human) (HUMALOG) injection 0-10 Units  0-10 Units Subcutaneous TID AC B
axel Jordan, DPM   2 Units at 19 0625 
   insulin lispro (human) (HUMALOG) injection 0-5 Units  0-5 Units Subcutaneous Nightly Br
marioeloise Jordan, DPM   1 Units at 18 
   isosorbide mononitrate (IMDUR) 24 hr tablet 60 mg  60 mg Oral Daily Srinivasanmario Jordan, DP
M   60 mg at 19 0824 
   loperamide (IMODIUM) capsule 2 mg  2 mg Oral PRN Srinivasan Jordan, DPM     
   LORazepam (ATIVAN) tablet 1 mg  1 mg Oral Daily PRN Srinivasan L Julian, DPM   1 mg at 12/3
1/18 1625 
   losartan (COZAAR) tablet 100 mg  100 mg Oral Daily Srinivasanmario Jordan, DPM   100 mg at  0824 
   nitroGLYCERIN (NITROSTAT) SL tablet 0.4 mg  0.4 mg Sublingual Q5 Min PRN Srinivasan L Favian navas, DPM   0.4 mg at 18 
   nortriptyline (PAMELOR) capsule 25 mg  25 mg Oral Daily Srinivasanmario Jordan, DPM   25 mg at
 19 0823 
   nortriptyline (PAMELOR) capsule 75 mg  75 mg Oral Nightly João Asher MD   75 mg at 1
2112 
   ondansetron (ZOFRAN-ODT) disintegrating tablet 4 mg  4 mg Oral Q6H PRN Srinivasan Jordan,
 DPM   4 mg at 18 0902 
  Or 
   ondansetron (ZOFRAN) injection 4 mg  4 mg Intravenous Q6H PRN Srinivasan Jordan, DPM     
   oxybutynin (DITROPAN) tablet 2.5 mg  2.5 mg Oral BID Srinivasan Jordan DPM   2.5 mg at 0
19 0823 
   pantoprazole (PROTONIX) EC tablet 40 mg  40 mg Oral QAM AC Srinivasan Jordan DPM   40 mg
 at 19 0625 
   polyethylene glycol (GLYCOLAX) packet 17 g  17 g Oral Daily PRN Srinivasan Jordan, DPM   
 
  
   prochlorperazine tablet 5 mg  5 mg Oral BID PRN Srinivasan Jordan, DPM     
   rOPINIRole (REQUIP) tablet 0.75 mg  0.75 mg Oral Nightly Srinivasan Jordan DPM   0.75 mg
 at 18 
   sodium chloride (PF) 0.9 % flush 10 mL  10 mL Intravenous Q8H PRN Srinivasan Jordan DPM 
  10 mL at 18 0747 
   vancomycin (VANCOCIN) 750 mg/250 mL IVPB  750 mg Intravenous See Admin Instructions Rene
ah H Frost, RPH     
 
 Allergies 
 Allergen Reactions 
   Quinapril Hcl Anaphylaxis 
   Digoxin And Related Other (See Comments) 
   toxic 
   Metaxalone Other (See Comments) 
   Morphine Mental Changes 
   Make her crazy/ "funny feeling in my head"
 Has used hydromorphone in-house 
   Pantoprazole Sodium Nausea and Vomiting 
   Penicillins Rash 
   Quinapril Hcl Edema 
   Facial Edema 
   Sudafed [Pseudoephedrine Hcl] Rash 
 
 Social History 
 
 Social History 
   Marital status:  
   Spouse name: N/A 
   Number of children: N/A 
   Years of education: N/A 
 
 Occupational History 
   Not on file. 
 
 Social History Main Topics 
   Smoking status: Former Smoker 
   Packs/day: 1.00 
   Years: 30.00 
   Types: Cigarettes 
   Quit date: 2004 
   Smokeless tobacco: Never Used 
    Comment: quite smoking  
   Alcohol use No 
   Drug use: No 
   Sexual activity: No 
 
 Other Topics Concern 
   Not on file 
 
 Social History Narrative 
  She lives with . 2 kids. Worked in banking 
 
 No smoking. No alcohol intake. Recreational Drug Use
 NO Travel
 mp Pets
 Immunization History 
 Administered Date(s) Administered 
   Hepatitis B Adult 2016 
   Influenza, Trivalent W/Preservative 2016 
 
   Pneumococcal Polysaccharide 23-valent 2012 
  
 
 Pneumococcal
 Influenza
 
 Family History 
 Problem Relation Age of Onset 
   High cholesterol Father  
   Hypertension Father  
   Diabetes Paternal Grandmother  
   Malig hypertherm Neg Hx  
   Kidney disease Neg Hx  
 
 REVIEW OF SYSTEMS
 
 General: The patient noted to have no  problem of fever,no  weight loss
  and no chills.
 
 Neurologic: Denies any headache, any lightheadedness. No loss of
 
 consciousness or seizure.
 
 Cardiac: with  No Chest Pain,with no  Palpitation,no  Dyspnea on Exertion
 
 Pulmonary: Denies cough, wheezing and hemoptysis
 
 GASTROINTESTINAL: Denies nausea vomiting, and diarrhea.
 GENITOURINARY: Noted no dysuria, urgency, or frequency.
 Hematological : Denies any ecchymosis, bleeding or hematuria
 
 Endocrine: Denies any polyphagia, polyuria or hot and cold intolerance
  Musculoskeletal: no new joint pain and swelling. 
 Skin : Denies any new rashes or cutaneous changes.
 
 Rest of the review of systems is unremarkable.
 
 PHYSICAL EXAMINATION
 
 VITAL SIGNS 
 Wt Readings from Last 3 Encounters: 
 18 68.7 kg (151 lb 7.3 oz) 
 18 65.8 kg (145 lb 1 oz) 
 18 65.9 kg (145 lb 4.5 oz) 
 
 Temp Readings from Last 3 Encounters: 
 19 97.8 F (36.6 C) (Oral) 
 18 98.2 F (36.8 C) (Oral) 
 18 97.9 F (36.6 C) (Oral) 
 
 BP Readings from Last 3 Encounters: 
 19 138/61 
 18 139/63 
 18 111/61 
 
 Pulse Readings from Last 3 Encounters: 
 19 87 
 18 74 
 18 85 
 
 
 Head:  Normocephalic atraumatic
 
 HEENT: Pink palpebral conjunctivae. Anicteric sclerae. No
 tonsillopharyngeal congestion.
 
 Neck : Noted no  Cervical Lymphadenopathy
 
 CHEST:Clear Breath Sounds Bilateral . 
 
 HEART: Regular rate and rhythm. No murmurs thrills or rubs
 
 ABDOMEN: Soft, flat. No tenderness. No hepatosplenomegaly.
 
 GROIN AREA: Negative  for intertrigo.
 
 EXTREMITIES: upper extremity no lesion s.
 Right lower extremities no edema. Left foot with dressing.
  
 
 NEUROLOGIC: No localizing signs.
 
 Skin : NO rash or ecchymosis. 
 
 LABORATORY DATA
 
 Lab Results 
 Component Value Date 
  WBC 5.33 2019 
  WBC 5.33 2019 
  HGB 8.9 (L) 2019 
  HGB 8.9 (L) 2019 
  HCT 27.6 (L) 2019 
  HCT 27.6 (L) 2019 
   2019 
   2019 
  CHOL 101 2017 
  TRIG 132 2017 
  HDL 34 (L) 2017 
  ALT 16 2019 
  AST 21 2019 
   2019 
  K 4.8 2019 
  CL 97 (L) 2019 
  CREATININE 8.1 (H) 2019 
  BUN 45 (H) 2019 
  CO2 25 2019 
  TSH 1.14 2017 
  INR 1.0 2018 
  GLUF 166 (H) 2019 
  HGBA1C 7.5 (H) 2018 
 
 Hepatic Function Panel:  
 Lab Results 
 Component Value Date 
  PROT 6.0 (L) 2019 
  ALB 2.3 (L) 2019 
  BILITOT 0.4 2019 
  BILIDIR 0.1 2017 
  ALP 76 2019 
  AST 21 2019 
 
  ALT 16 2019 
  
 
 Lipase: 
 Lab Results 
 Component Value Date 
  LIPASE 332 2017 
  
 U/A:  
 Lab Results 
 Component Value Date 
  COLORU YELLOW 2011 
  CLARITYU Slightly Cloudy 10/16/2018 
  MEROPENEM 1.009 2013 
  LEUKOCYTESUR  10/16/2018 
    Comment: 
    small 
  NITRITE Negative 10/16/2018 
  UROBILINOGEN Normal 10/16/2018 
  UPRO  10/16/2018 
    Comment: 
    100 
  UPRO 100 2013 
  PHUR 8 10/16/2018 
  BLOODU Negative 10/16/2018 
  KETONES negative 10/16/2018 
  BILIRUBINUR Negative 10/16/2018 
  GLUCOSEU  10/16/2018 
    Comment: 
    moderate 
  
 DIAGNOSTIC IMAGING
 
 CAT scan 
 
 CT head without contrast [[id number]] Collected: 18 1718 
 Order Status: Completed Updated: 18 1733 
 Narrative:  
 CHERIE VILLALOBOS
 1950
 68 years Female
 CT HEAD WO CONTRAST
 2018 5:23 PM
 
 INDICATION: Altered mental status in a patient with acute osteomyelitis
 
 COMPARISON: Head CT, 2017
 
 TECHNIQUE: CT scan of the head without contrast. 5-mm axial noncontrast images were acqui
red from the foramen magnum through the cranial vertex. Multiplanar reformations were obtain
ed from the acquisition data.
 
 At least one of the following CT dose optimization techniques were used: Automated exposure
 control; Adjustment of mA and/or kV according to patient size; Use of iterative reconstruct
ion technique.
 
 FINDINGS:
 
 Brain: No intracranial hemorrhage, midline shift or pathologic mass effect. No cerebral kt
ma, mass lesion or evidence of acute infarct.
 
 
 Ventricles and extra-axial fluid spaces: Normal.
 
 Paranasal sinuses and mastoid air cells: Normal.
 
 Calvarium and extracranial soft tissues: Normal.
 
 Orbits: The patient is status post bilateral cataract surgery. 
 Impression:  
 1. No acute intracranial pathology. 
 
 Xr Chest 2 View
 
 Result Date: 2018
 1.  Mild diffuse pulmonary edema, similar to the prior exam. 2.  Left perihilar airspace op
acity which may represent edema. Recommend repeat chest x-ray in 6-8 weeks to verify resolut
ion. 3.  Small left pleural effusion and/or pleural scarring, with adjacent atelectasis or c
onsolidation, similar to the prior exam. Small right pleural effusion. 4.  Peripheral opacit
y within the left lower hemithorax, slightly increased which is questionable for a loculated
 pleural fluid, parenchymal opacity, or artifact. Electronically signed by Chau Garcia on 1
2018 5:33 PM
 
 X-ray Foot Left
 
 Result Date: 2018
 Amputation of the left forefoot at the level of the proximal metatarsals. Surgical cutaneou
s staples are present. No radiographic evidence for osteomyelitis. Normal alignment of the h
indfoot. Mild degeneration of the midfoot. Electronically signed by Oleksandr Lemus MD on 2018 10:12 AM
 
 Ct Head Without Contrast
 
 Result Date: 2018
 1.  No acute intracranial pathology. Electronically signed by Steven Samano MD on  5:28 PM
 
 Nm Myocardial Perfusion Spect (stress And Rest)
 
 Result Date: 2018
 1.  Mild to moderate left ventricular dilatation, unchanged between rest and stress. 2.  I 
cannot exclude mid anterior wall infarct. 3.  I see no evidence of ischemia. 4.  Global mode
rate hypokinesis. 5.  LVEF 29% stress, 33% rest. Using the risk stratification system at our
 institution, this examination equates to at least a high risk based on this imaging, bri cm from the criteria below (1). Note that this should be interfaced with clinical and other d
patrizia, potentially affecting final categorization. American Heart Association and American Col
lege of Cardiology Scientific Statement. Circulation (2008); 118: p 5676-7053 Electronically
 signed by Wilfrido Knight MD on 2018 4:34 PM
 
 Ir Angiogram Extremity Left
 
 Result Date: 2018
 1. Aorta with narrow aortic bifurcation with moderate stenosis of proximal left common errol
c artery. 2. Left SFA with moderate stenosis proximally. 3. Single vessel runoff to left jeanne
t via left anterior tibial artery. 4. Left posterior tibial artery and peroneal artery were 
occluded with reconstitution of distal posterior tibial artery at the left ankle via collate
rals. 5. If forefoot amputation does not heal, may need popliteal to posterior tibial artery
 bypass versus antegrade access of left common femoral artery with posterior tibial artery r
ecanalization. Electronically signed by Kirt Mclaughlin MD on 2018 10:51 AM
 
 Cherie Villalobos is a 68 y.o. female with the following Problems 
 
 Patient Active Problem List 
 Diagnosis 
   Proteinuria 
   Vitamin D deficiency 
   Secondary hyperparathyroidism (HCC) 
   Recurrent UTI 
   Coronary artery disease involving native coronary artery of native heart without angina
 pectoris 
   Depression 
   ESRD (end stage renal disease) ([facility]) 
   Type 2 diabetes mellitus with chronic kidney disease on chronic dialysis, with long-ter
m current use of insulin (HCC) 
   Anemia in ESRD (end-stage renal disease) ([facility]) 
   Hypertension, essential 
   Anemia of chronic renal failure, stage 5 (HCC) 
   Dyslipidemia 
   Gastroesophageal reflux disease without esophagitis 
   Diabetic foot ulcer (HCC) 
   Loss of weight 
   Dysphagia, unspecified 
   Foot ulcer due to DM  ([facility]) 
   Severe protein-calorie malnutrition (HCC) 
   Osteomyelitis of left foot ([facility]) 
   Pleural effusion 
   Prolonged Q-T interval on ECG 
   Chronic systolic congestive heart failure (HCC) 
   Chronic diarrhea 
   Chronic anticoagulation 
   Plantar ulcer (HCC) 
   Cardiomyopathy (HCC) 
   COPD (chronic obstructive pulmonary disease) (HCC) 
   ICD (implantable cardioverter-defibrillator) in place 
   Implantable defibrillator reprogramming/check 
   Mixed hyperlipidemia 
   Moderate protein-calorie malnutrition (HCC) 
   Paroxysmal atrial fibrillation (HCC) 
   S/P CABG x 2 
   S/P mitral valve repair 
   Steroid-induced hyperglycemia 
   Stress hyperglycemia 
   Chronic multifocal osteomyelitis of left foot (HCC) 
   PVD (peripheral vascular disease) (HCC) 
   CAD (coronary artery disease) 
 
 Plan:
 
 CBC CMP ESR CRP 
 q Monday 
 Vancomycin 500 mg iv and ceftazidime 2 gm after each hemodialysis
 EEG today 
 RECOMMENDATIONS
 Start discharge planning for iv vancomycin and cefrazidime for 6 weeks unitil 19 
 
 Discussed with Dr. Vincent hospitalist  who agreed and will proceed
 As plan.
 Thank you very much for the consult.
 ______________________
 
 ______________________
 MD Melisa FRANKLIN, Winston, DO - 2018  3:27 PM PSTFormatting of this note may be different from the 
original.
 [facility]
 Service:  Hospitalist
 Progress Note
 
 Pt: Cherie Villalobos  AGE/SEX: 68 y.o.   female      : 1950      
 MRN: [id number]          ROOM: 428/428-1   
 TODAY'S DATE: 2018
 
 Hospital Day:   LOS: 3 days 
 SUBJECTIVE
 Patient evaluated at bedside. Patient denies significant foot pain in the left foot. No new
 complaints otherwsie. No fevers, chills, n/v, abd.pain, CP. 
 
 Scheduled Medications   apixaban  2.5 mg Oral BID 
   aspirin  81 mg Oral Daily with breakfast 
   atorvastatin  20 mg Oral Nightly 
   calcium acetate  667 mg Oral TID WC 
   carvedilol  3.125 mg Oral BID WC 
   cefepime  2 g Intravenous Q48H 
   clopidogrel  75 mg Oral Daily 
   furosemide  20 mg Oral Daily 
   insulin glargine  20 Units Subcutaneous Nightly 
   insulin lispro (human)  0-10 Units Subcutaneous TID AC 
   insulin lispro (human)  0-5 Units Subcutaneous Nightly 
   isosorbide mononitrate  60 mg Oral Daily 
   losartan  100 mg Oral Daily 
   nortriptyline  25 mg Oral Daily 
   nortriptyline  75 mg Oral Nightly 
   oxybutynin  2.5 mg Oral BID 
   pantoprazole  40 mg Oral QAM AC 
   rOPINIRole  0.75 mg Oral Nightly 
   vancomycin  750 mg Intravenous See Admin Instructions 
 
 Continuous Infusions 
   dextrose   
 
 PRN Medications
 acetaminophen **OR** acetaminophen, albumin human, albuterol, dextrose, dextrose, dextrose,
 glucagon, glucagon, HYDROcodone-acetaminophen **OR** HYDROcodone-acetaminophen, loperamide,
 LORazepam, nitroGLYCERIN, ondansetron **OR** ondansetron, polyethylene glycol, prochlorpera
zine, saline lock IV - when tolerating PO fluids **AND** sodium chloride (PF)
 
 Allergy:
 Allergies 
 Allergen Reactions 
   Quinapril Hcl Anaphylaxis 
   Digoxin And Related Other (See Comments) 
   toxic 
   Metaxalone Other (See Comments) 
   Morphine Mental Changes 
   Make her crazy/ "funny feeling in my head"
 Has used hydromorphone in-house 
   Pantoprazole Sodium Nausea and Vomiting 
   Penicillins Rash 
   Quinapril Hcl Edema 
   Facial Edema 
   Sudafed [Pseudoephedrine Hcl] Rash 
 
 
 OBJECTIVE
 Vitals:
 Patient Vitals for the past 24 hrs:
  BP Temp Temp src Pulse Resp SpO2 
 18 1114 138/59 98 F (36.7 C) Oral 91 16 98 % 
 18 0705 122/57 98.6 F (37 C) Oral 77 16 97 % 
 18 0338 113/53 98.4 F (36.9 C) Oral 72 16 97 % 
 18 0015 108/53 - - 70 - - 
 18 2348 (!) 85/42 - - - - - 
 18 2342 (!) 83/39 98.1 F (36.7 C) Oral 71 16 96 % 
 18 1938 118/56 98.6 F (37 C) Oral 79 18 - 
 18 1610 91/52 98.5 F (36.9 C) Oral 69 16 97 % 
 
 I&O Detailed Table: 
 
 Intake/Output Summary (Last 24 hours) at 18 1527
 Last data filed at 18 1225
  Gross per 24 hour 
 Intake              700 ml 
 Output              180 ml 
 Net              520 ml 
 
 Patient Vitals for the past 96 hrs:
  Weight 
 18 1549 68.7 kg (151 lb 7.3 oz) 
 18 68.7 kg (151 lb 7.3 oz) 
 
 Hemodynamics Last 24hrs:   
 
 Examination:
 
 Constitutional: Oriented to person, place, and time. appears well-developed and well-nouris
hed. 
 
 HEENT: 
 Head: Normocephalic and atraumatic. 
 Nose: Nose normal. 
 Mouth/Throat: Oropharynx is clear and moist.
 Eyes: Conjunctivae and EOM are normal. Pupils are equal, round, and reactive to light. Righ
t eye exhibits no discharge. Left eye exhibits no discharge. No scleral icterus. 
 Neck: Normal range of motion. Neck supple. No JVD present. No tracheal deviation present. N
o thyromegaly present. no cervical adenopathy.
 
 Cardiovascular: Normal rate, regular rhythm, normal heart sounds with S1 and S2, and intact
 distal pulses.  Exam reveals no gallop and no friction rub.  No murmur heard.
 
 Pulmonary/Chest: Effort normal and breath sounds normal. No stridor. No respiratory distres
s.  no wheezes. no rales. exhibits no tenderness. 
 
 Abdominal: Soft. Bowel sounds are normal. exhibits no distension and no mass. There is no t
enderness. There is no rebound and no guarding. 
 
 Extremities/Musculoskeletal: Normal range of motion.exhibits no tenderness.  exhibits no ed
ema. Left leg bandage in place. 
   
 Neurological:  Alert and oriented to person, place, and time. Has normal reflexes.  display
s normal reflexes. No cranial nerve deficit.  Exhibits normal muscle tone. Coordination norm
al.
 
 
 Skin: Skin is warm and dry. No rash noted. No erythema. No pallor. 
 
 Psychiatric: Has a normal mood and affect. Behavior is normal. Judgment normal. 
 
 LABS:
 WBC 
 Date Value Ref Range Status 
 2018 7.20 3.80 - 11.00 K/uL Final 
 
 RBC 
 Date Value Ref Range Status 
 2018 2.75 (L) 3.70 - 5.10 M/uL Final 
 
 HGB 
 Date Value Ref Range Status 
 2018 9.5 (L) 11.3 - 15.5 g/dL Final 
 
 HCT 
 Date Value Ref Range Status 
 2018 28.7 (L) 34.0 - 46.0 % Final 
 
 MCV 
 Date Value Ref Range Status 
 2018 104.3 (H) 80.0 - 100.0 fl Final 
 
 MCH 
 Date Value Ref Range Status 
 2018 34.5 (H) 27.0 - 34.0 pg Final 
 
 MCHC 
 Date Value Ref Range Status 
 2018 33.1 32.0 - 35.5 g/dL Final 
 
 RDW SD 
 Date Value Ref Range Status 
 2018 61.7 (H) 37 - 53 fl Final 
 
 PLT 
 Date Value Ref Range Status 
 2018 111 (L) 150 - 400 K/uL Final 
 
 MPV 
 Date Value Ref Range Status 
 2018 8.3 fl Final 
 
 NEUTROPHILS ABS 
 Date Value Ref Range Status 
 2018 5.51 1.90 - 7.40 K/uL Final 
 
 LYMPHOCYTES ABS 
 Date Value Ref Range Status 
 2018 0.84 (L) 1.00 - 3.90 K/uL Final 
 
 EOSINOPHILS ABS 
 Date Value Ref Range Status 
 2018 0.00 0.00 - 0.50 K/uL Final 
 
 BASOPHILS ABS 
 Date Value Ref Range Status 
 2018 0.04 0.00 - 0.10 K/uL Final 
 
   Comment: 
   Testing performed at [facility] [place];[address] [place] [id number] 
 
 Neutrophils Manual 
 Date Value Ref Range Status 
 2018 94 % Final 
 
 Lymphocytes Manual 
 Date Value Ref Range Status 
 2018 5 % Final 
 
 Monocytes Manual 
 Date Value Ref Range Status 
 2018 1 % Final 
 
 MORPHOLOGY 
 Date Value Ref Range Status 
 2018 2+  Corrected 
   Comment: 
   MACRO
 2+
 ANISO
 NORMAL PLT MORPH
 Testing performed at [facility], [address] [place]  [address]
  
 
 GLUCOSE 
 Date Value Ref Range Status 
 2018 92 65 - 99 mg/dL Final 
 
 BUN 
 Date Value Ref Range Status 
 2018 21 8 - 25 mg/dL Final 
 
 CREATININE 
 Date Value Ref Range Status 
 2018 4.3 (H) 0.50 - 1.00 mg/dL Final 
 
 BUN/CREAT 
 Date Value Ref Range Status 
 2018 5  Final 
 
 TOTAL PROTEIN 
 Date Value Ref Range Status 
 2018 6.3 6.3 - 8.2 g/dL Final 
 
 GLOBULIN 
 Date Value Ref Range Status 
 2018 3.7 1.3 - 4.9 g/dL Final 
 
 TBIL 
 Date Value Ref Range Status 
 2018 0.6 0.1 - 1.5 mg/dL Final 
 
 ALT 
 Date Value Ref Range Status 
 2018 13 10 - 65 U/L Final 
 
 AST 
 Date Value Ref Range Status 
 
 2018 24 10 - 45 U/L Final 
 
 SODIUM 
 Date Value Ref Range Status 
 2018 135 135 - 145 mmol/L Final 
 
 POTASSIUM 
 Date Value Ref Range Status 
 2018 4.4 3.5 - 4.9 mmol/L Final 
 
 CHLORIDE 
 Date Value Ref Range Status 
 2018 95 (L) 99 - 109 mmol/L Final 
 
 CO2 
 Date Value Ref Range Status 
 2018 29 23 - 32 mmol/L Final 
 
 ANION GAP AGAP 
 Date Value Ref Range Status 
 2018 15 5 - 20 mmol/L Final 
 
 [unfilled]
 HDL CHOL 
 Date Value Ref Range Status 
 2017 34 (L) >40 mg/dL Final 
 
 CHOLESTEROL 
 Date Value Ref Range Status 
 2017 101 <200 mg/dL Final 
 
 TSH 
 Date Value Ref Range Status 
 2017 1.14 0.45 - 5.10 uIU/mL Final 
   Comment: 
   Testing performed at [facility];[address] [place] [address];[place] [address] 
 
 TOTAL PROTEIN 
 Date Value Ref Range Status 
 2018 6.3 6.3 - 8.2 g/dL Final 
 
 TBIL 
 Date Value Ref Range Status 
 2018 0.6 0.1 - 1.5 mg/dL Final 
 
 BILI, DIRECT 
 Date Value Ref Range Status 
 2017 0.1 0.0 - 0.3 mg/dL Final 
 
 AST 
 Date Value Ref Range Status 
 2018 24 10 - 45 U/L Final 
 
 ALT 
 Date Value Ref Range Status 
 2018 13 10 - 65 U/L Final 
 
 Diagnostic:
 Xr Chest 2 View
 
 
 Result Date: 2018
 CHERIE VILLALOBOS 1950 68 years Female XR CHEST 2 VIEW FRONTAL AND LATERAL 2018
 5:27 PM INDICATION: Chest pain COMPARISON: 2018, CT chest 2017 TECHNIQUE: Two vie
w chest, PA and lateral views FINDINGS: The heart is stable in size. Patient is post median 
sternotomy. Right cardiac ICD in situ. Mild diffuse coarsening of lung markings. Suspect mil
d underlying interstitial edema. 1.6 cm perihilar opacity noted on the left. Blunting of the
 left costophrenic angle which may be secondary to effusion and/or scarring. Small right ple
ural effusion. Peripheral opacity along the lower lateral hemithorax, slightly increased que
stionable for artifact or loculated pleural fluid. Atelectasis or consolidation within the l
eft lung base, similar to the prior exam. 
 
 1.  Mild diffuse pulmonary edema, similar to the prior exam. 2.  Left perihilar airspace op
acity which may represent edema. Recommend repeat chest x-ray in 6-8 weeks to verify resolut
ion. 3.  Small left pleural effusion and/or pleural scarring, with adjacent atelectasis or c
onsolidation, similar to the prior exam. Small right pleural effusion. 4.  Peripheral opacit
y within the left lower hemithorax, slightly increased which is questionable for a loculated
 pleural fluid, parenchymal opacity, or artifact. Electronically signed by Chau Garcia on 2018 5:33 PM
 
 X-ray Foot Left
 
 Result Date: 2018
 CHERIE CHIU DEYSI 1950 XR FOOT LEFT 2018 9:54 AM INDICATION: Osteomyelitis of 
the left foot. COMPARISON: 2018 TECHNIQUE: Left foot series, 3 views, PA, obliq
ue and lateral views 
 
 Amputation of the left forefoot at the level of the proximal metatarsals. Surgical cutaneou
s staples are present. No radiographic evidence for osteomyelitis. Normal alignment of the h
indfoot. Mild degeneration of the midfoot. Electronically signed by Oleksandr Lemus MD on 2018 10:12 AM
 
 Nm Myocardial Perfusion Spect (stress And Rest)
 
 Result Date: 2018
 HISTORY: Chest pain. Peripheral vascular disease. Multiple coronary stents. Multiple previo
us myocardial infarctions. CABG. TECHNIQUE: The patient was intravenously injected with 10.5
 millicuries technetium 99m sestamibi. Rest gated supine SPECT images were obtained.  The pa
tient was then intravenously injected with 0.4 mg Regadenason per protocol.    The patient w
as finally intravenously injected with 30.1 mCi technetium 99m sestamibi, and stress gated s
upine SPECT, and prone SPECT scintigraphic images were obtained. COMPARISON: Echocardiogram 
18. Patient was injected on 17 for a rest SPECT study, but no images were perfor
med. Coronary arteriography 18. FINDINGS: Moderate left ventricular dilatation, unchan
ged between rest and stress. Thinning of the proximal inferior wall radiating minimally into
 the lateral wall. This is unchanged between rest and stress supine images. Stress prone lanre
ges show resolution of this finding, favoring artifact. There is mild thinning of the mid an
terior wall slightly radiating into the septum, unchanged between rest and stress. I cannot 
exclude infarct. LVEF measures 29% stress, 33% rest. Moderate global hypokinesis. Rest EDV 1
70 mL. Rest  mL. Stress  mL. Stress  mL. Transient ischemic dilatation 
ratio 1.06, normal. 
 
 1.  Mild to moderate left ventricular dilatation, unchanged between rest and stress. 2.  I 
cannot exclude mid anterior wall infarct. 3.  I see no evidence of ischemia. 4.  Global mode
rate hypokinesis. 5.  LVEF 29% stress, 33% rest. Using the risk stratification system at our
 institution, this examination equates to at least a high risk based on this imaging, arisin
g from the criteria below (1). Note that this should be interfaced with clinical and other d
patrizia, potentially affecting final categorization. American Heart Association and American Col
lege of Cardiology Scientific Statement. Circulation (2008); 118: p 7523-4290 Electronically
 signed by Wilfrido Knight MD on 2018 4:34 PM
 
 Ir Angiogram Extremity Left
 
 
 Result Date: 2018
 LOWER EXTREMITY ARTERIOGRAM, UNILATERAL PREOPERATIVE DIAGNOSIS:  Critical limb ischemia and
 need for left forefoot amputation POSTOPERATIVE DIAGNOSIS:  Same PROCEDURE: 1. Moderate con
scious sedation 2. Ultrasound guided access of the right common femoral artery 3. Aortogram 
4. Selective left common femoral artery catheterization with left leg runoff 5. Left common 
iliac artery angioplasty using 6 x 40 mm angioplasty balloon 6. Drug eluting balloon angiopl
asty of left SFA using 4 x 100 mm Lutonix balloon SURGEON: Kirt Mclaughlin MD ASSISTANT: Arianna ANEST
HESIA: Moderate sedation and local anesthesia Informed consent was obtained from the patient
. Continuous cardiac monitoring was performed throughout the procedure. Conscious sedation w
as provided by the nursing staff during the procedure under my supervision. Sedation time: 5
6 minutes Medications: 1 mg Versed IV, 100 mcg Fentanyl IV ESTIMATED BLOOD LOSS: Minimal CON
TRAST:  55 mL of Isovue 250 INDICATIONS:  See preoperative history and physical FINDINGS:  A
jabier with narrow aortic bifurcation. Moderate stenosis of proximal left common iliac artery 
making passing sheath difficult. Left SFA with moderate stenosis proximally. Single vessel r
unoff seen via left anterior tibial artery to foot. The posterior tibial artery does reconst
itute in left ankle via collaterals. Unable to pass sheath over aortic bifurcation due to il
iac disease and small arteries. After angioplasty, moderate angiographic results. Should be 
able to heal left forefoot amputation. If amputation does not heal, may need popliteal to po
sterior tibial artery bypass versus antegrade access of left common femoral artery with post
erior tibial artery recanalization. DESCRIPTION OF PROCEDURE:  The patient was properly iden
tified and brought to the Cath Lab. The patient was placed supine on the catheter table and 
patient was given moderate sedation by nursing staff under my supervision. Patient's bilater
al groins were then prepped and draped in the usual sterile fashion. Local anesthetic was gi
fidelia to the right groin and the right common femoral artery was accessed under ultrasound oksana
dance using a micropuncture needle. A micropuncture sheath was inserted over a wire and up s
ized to a 4 French sheath. A Omni flush catheter was then inserted into the distal aorta and
 aortogram was then performed with the findings as described above. The Omni Flush catheter 
was then used to guide a Glidewire into the left common femoral artery and then the catheter
 was then advanced over the wire. A left lower extremity runoff was then performed with the 
findings as described above. Next, an Amplatzer wire was then inserted over the catheter and
 the 4 French sheath was removed. A 6 French destination sheath was then inserted over the w
antione with the tip of the sheath being placed into the proximal left common iliac artery. The 
sheath would not pass through the diseased left common iliac artery. Angioplasty of the left
 common iliac artery was then performed using 6 x 40 mm angioplasty balloon. However, the sh
eath with still not pass after angioplasty. A vertebral catheter was inserted over the wire 
and the wire was then removed. A Glidewire was then placed into the vertebral catheter and u
sed to cross through the stenotic left superficial femoral artery.  Next, a 260 fix core wir
e was then inserted over the catheter.  Drug eluting balloon angioplasty of the left SFA was
 then performed using 4 x 100 mm Lutonix balloon. Unable to cross left posterior tibial britt
ry occlusion due to tip is sheath only being in left common iliac artery. A final arteriogra
m was then performed through the sheath. The destination sheath was then removed and a Mynx 
closure device was then used on the right common femoral artery and hemostasis was ensured. 
The patient was then taken to the observation unit for further monitoring. 
 
 1. Aorta with narrow aortic bifurcation with moderate stenosis of proximal left common errol
c artery. 2. Left SFA with moderate stenosis proximally. 3. Single vessel runoff to left jeanne
t via left anterior tibial artery. 4. Left posterior tibial artery and peroneal artery were 
occluded with reconstitution of distal posterior tibial artery at the left ankle via collate
rals. 5. If forefoot amputation does not heal, may need popliteal to posterior tibial artery
 bypass versus antegrade access of left common femoral artery with posterior tibial artery r
ecanalization. Electronically signed by Kirt Mclaughlin MD on 2018 10:51 AM
 
 Echo Cardiac Adult With Contrast
 
 Result Date: 2018
 Patient Name:  CHERIE VILLALOBOS YOB: 1950 Accession:  [id number] Performing Phys
ician:   Duglas Ornelas MD _______________________________________________________________ IND
ICATIONS ----------- SHORTNESS OF BREATH, DEFINITY WAS USED/SIGNIFICANT PATIENT REACTION FLA
NK PAIN CONCLUSIONS ----------- 1. Overall left ventricular systolic function is severely im
 
paired with, an EF between 20 - 25 %. 2. There is mild aortic regurgitation. 3. There is mil
d to  moderate aortic stenosis present. 4. Mild-to-moderate mitral regurgitation is present.
 5. There is moderate pulmonary hypertension. FINDINGS -------- Study: A 2-dimensional trans
thoracic echocardiogram with m-mode, spectral and color flow Doppler was perfomed. Study: Th
is was a technically adequate study. Left Ventricle: Overall left ventricular systolic funct
ion is severely impaired with, an EF between 20 - 25 %. Left Ventricle: The left ventricle c
avity size is normal. Left Ventricle: Left ventricular wall thickness is normal. Right Ventr
icle: The right ventricle is mildly enlarged measuring between 3.4 - 3.7 cm. Right Ventricle
: The right ventricular systolic function is at the low end of normal. Right Ventricle: Pace
r/ICD wire seen. Left Atrium: The left atrial size is normal Left Atrium: , and the LA measu
res 4.6cm. Right Atrium: The right atrium is normal in size. Right Atrium: Pacemaker wire se
en in the right atrial cavity. Aortic Valve: The aortic valve is trileaflet. Aortic Valve: T
he aortic valve is mildly calcified. Aortic Valve: There is mild aortic regurgitation. Aorti
c Valve: There is moderate aortic stenosis present. Mitral Valve: Mild-to-moderate mitral re
gurgitation is present. Mitral Valve: Mild mitral annular calcification present. Tricuspid V
alve: The tricuspid valve appears structurally normal. Tricuspid Valve: Mild tricuspid regur
gitation present. Tricuspid Valve: There is moderate pulmonary hypertension. Tricuspid Valve
: The right ventricular systolic pressure (pulmonary artery systolic pressure), as measured 
by Doppler, is 55.99mmHg. Pulmonic Valve: The pulmonic valve was not well visualized. Pulmon
ic Valve: Mild pulmonic regurgitation. Pericardium: There is no pericardial effusion. IVC/He
patic Veins: The IVC is normal size (1.5-2.5cm) and collapses >50% with sniff, consistent wi
th central venous pressures of 5-10mmHg. Contrast: Poor visualization. Definity was used to 
opacify the left ventricular chamber and improve delineation of the endocardial border. DIANE
UREMENTS ------------- CHEIKH Planimetry:   2.21 cm2 AVAI Planimetry:   0.00 cm2/m2 RA Area:   
13.84 cm2 IVC:   1.61 cm LA Major:   5.27 cm EDV(Teich):   121.12 ml IVSd:   0.82 cm LVIDd: 
  5.05 cm LVPWd:   0.88 cm LVOT Diam:   1.87 cm %FS:   9.90 % EF(Teich):   21.60 % ESV(Teich
):   94.95 ml IVSs:   0.96 cm LVIDs:   4.55 cm LVPWs:   1.05 cm SV(Teich):   26.17 ml RA Adan
or:   4.85 cm RVIDd:   3.62 cm LVEF MOD A2C:   23.68 % SV MOD A2C:   32.38 ml LVEF MOD A4C: 
  21.82 % SV MOD A4C:   27.33 ml EF Biplane:   22.47 % LVEDV MOD BP:   131.72 ml LVESV MOD B
P:   102.11 ml LVEDV MOD A2C:   136.73 ml LVLd A2C:   8.73 cm LVEDV MOD A4C:   125.25 ml LVL
d A4C:   8.87 cm LVESV MOD A2C:   104.34 ml LVLs A2C:   8.34 cm LVESV MOD A4C:   97.91 ml LV
Ls A4C:   8.60 cm LAESV(A-L):   48.15 ml LAESV Index (A-L):   26.17 ml/m2 LAAs A2C:   16.24 
cm2 LAESV A-L A2C:   47.34 ml LALs A2C:   4.73 cm LAAs A4C:   16.52 cm2 LAESV A-L A4C:   47.
99 ml LALs A4C:   4.83 cm Ao Diam:   2.59 cm AV Cusp:   1.65 cm LA Diam:   4.60 cm LA/Ao:   
1.77 TAPSE:   1.57 cm IVC diameter:   1.61 cm IVC collapse:   0.68 cm IVC % collapse:   56.1
6 % AR Dec Lipscomb:   3.95 m/s2 AR Dec Time:   955.07 ms AR maxP.06 mmHg AR PHT:   276.
97 ms AR Vmax:   3.77 m/s HR:   68.91 BPM AV maxP.56 mmHg AV meanPG:   10.23 mmHg AV 
Vmax:   2.21 m/s AV Vmean:   1.53 m/s AV VTI:   47.77 cm CHEIKH Vmax:   1.03 cm2 CHEIKH (VTI):   0
.99 cm2 AVAI Vmax:   0.00 cm2/m2 AVAI (VTI):   0.00 cm2/m2 LVCI Dopp:   1.80 l/minm2 LVCO Do
pp:   3.32 l/min HR:   70.30 BPM LVOT maxP.71 mmHg LVOT meanP.55 mmHg LVSI Dopp:
   25.70 ml/m2 LVSV Dopp:   47.30 ml LVOT Vmax:   0.82 m/s LVOT Vmean:   0.58 m/s LVOT VTI: 
  17.07 cm MCO:   459.56 ms MV A Hiram:   0.83 m/s MV DecT:   232.85 ms MV E Hiram:   1.70 m/s M
V E/A Ratio:   2.05 MV PHT:   80.47 ms MVA By PHT:   2.73 cm2 MV A Dur:   82.77 ms MV maxP.14 mmHg MV meanP.79 mmHg MV Vmax:   1.74 m/s MV Vmean:   1.01 m/s MV VTI:   45.3
3 cm MVA (VTI):   1.04 cm2 Septal e':   0.04 m/s Septal E/e':   38.01 Lateral e':   0.08 m/s
 Lateral E/e':   21.28 P Vein D:   0.84 m/s P Vein S/D Ratio:   0.56 P Vein S:   0.47 m/s PA
EDP:   8.38 mmHg PRend P.38 mmHg PRend Vmax:   1.16 m/s HR:   69.37 BPM PV maxP.
87 mmHg PV meanP.11 mmHg PV Vmax:   0.68 m/s PV Vmean:   0.50 m/s PV VTI:   15.15 cm R
AP:   3 mmHg RV S':   0.08 m/s RVSP:   55.99 mmHg TR maxP.99 mmHg TR Vmax:   3.63 m/s
 TV A Hiram:   0.44 m/s TV Dec Lipscomb:   6.11 m/s2 TV Dec Time:   133.74 ms TV E Hiram:   0.81 m/
s TV E/A Ratio:   1.81 Sonographer: CEE Authenticated by: Duglas Ornelas MD Report Date/Time: 
2018 17:39:35 
 
 1. Overall left ventricular systolic function is severely impaired with, an EF between 20 -
 25 %. 2. There is mild aortic regurgitation. 3. There is mild to  moderate aortic stenosis 
present. 4. Mild-to-moderate mitral regurgitation is present. 5. There is moderate pulmonary
 hypertension.
 
 [facility]
 
 
 Result Date: 2018
 This Point of Care (POC) ultrasound image has been reviewed and interpreted by the physicia
eloise ackerman as the performing physician in the associated interpretation and report. 
 
 PROBLEM LIST
 Principal Problem:
   Osteomyelitis of left foot (HCC)
 Active Problems:
   Secondary hyperparathyroidism (HCC)
   Depression
   ESRD (end stage renal disease) (HCC)
   Type 2 diabetes mellitus with chronic kidney disease on chronic dialysis, with long-term 
current use of insulin (HCC)
   Anemia in ESRD (end-stage renal disease) (HCC)
   Hypertension, essential
   Anemia of chronic renal failure, stage 5 (HCC)
   Diabetic foot ulcer (HCC)
   Chronic systolic congestive heart failure (HCC)
   Chronic anticoagulation
   Cardiomyopathy (HCC)
   Paroxysmal atrial fibrillation (HCC)
   S/P CABG x 2
   S/P mitral valve repair
   PVD (peripheral vascular disease) (HCC)
   CAD (coronary artery disease)
 
 ASSESSMENT & PLAN
 
 Osteomyelitis of left foot SP left TMA by Dr. Jordan on 18. Bone culture grew normal
 skin marzena. However bone pathology is pending. Patient is receiving cefepime with each HD p
er nephrology and ID recommendations. 
 - Dr. Jordan is following patient in the hospital. 
 - ID consult from Dr. peguero appreciated. Will follow recommendations regarding use of 
future antibiotic.  
 - Continue vanc and cefepime for now. Discussed case with Dr. Awad, Cefepime will need to
 be changed as outpatient dialysis center does not ady this antibiotic. Discussed with Dr. Elliott, he will see patient and adjust abx as necessary. 
 
 Phantom pain.Resolved.
 
 ESRD. On HD every T-T-S. Last HD yesterday. Appreciate help from nephrology services. 
 
 Type II DM with complications. Most recent blood sugars have been in acceptable range . She
 is on Lantus and SSI. Last glycohemoglobin A1c was 7.5 on 18. Will continue current r
egimen of Lantus and Humalog. 
 
 CAD SP CABG, paroxysmal atrial fibrillation, HFrEF. All cardiac problems are under control.
 Will continue apixaban, carvedilol, aspirin, Plavix, losartan and Imdur. 
 
 History of PAD. SP left leg angioplasty. On Plavix and aspirin. 
 
 Anemia of chronic kidney disease. Management per nephrology services. 
 
 Disposition: [facility] george Vincent, 
 2018 3:27 PM
 
 Michelle Awad MD - 2018  2:36 PM PSTFormatting of this note may be different from t
adrianna original.
 
 428/428-1  
    LOS: 3 days 
 She was admitted with plan for left TMA with Dr. Jordan which she had done 12/27/18.
 She feels good today and denied any fever, chills, nausea, vomiting, diarrhea, shortness of
 breath, chest pain, cough.  
 Left foot pain is reasonably controlled.
 Discharge planning is ongoing.
 
 PMH, PSH, Social history reviewed in chart. Allergies and medications reviewed. Previous hi
story summarized as above. Prior lab data and imaging reviewed.Patient's old records and lab
s were reviewed in detail and summarized.
 
 ROS:
 Review of systems is as per history of present illness. Otherwise a 14 organ system review 
of systems was done and is negative.
 
 Examination:
 
 APPEARANCE: She is is alert, awake, oriented sitting up in bed in no distress. 
 VITALS: Reviewed as listed.
 HEAD: NC/AT. 
 EYES: EOMI. Non-icteric sclera.
 NECK: No JVD. 
 LUNGS: Effort fair. CTA. 
 HEART: S1 soft, no pericardial rub noted. Systolic murmur at apex with radiation to back.
 ABDOMEN: Soft with minimal distention, no tenderness. No organomegaly or bruits noted. Benoit
l sounds diminished.
 EXTREMITIES: No LE edema. No cyanosis or clubbing. Peripheral pulses not palpable. Left jeanne
t s/p TMA with bandage.
 SKIN: Warm to touch. No rash.
 NEUROLOGIC: Alert, awake, oriented, speech fluent. Gait not tested. 
 PSYCH: pleasant demeanor..
 BACK: No CVA tenderness noted. There is no sacral edema.
 
 Dialysis access
 Left brachiocephalic AV fistula with no evidence of malfunction or inflammation
 
 vital Signs:
 /59 (BP Location: Right upper arm)  | Pulse 91  | Temp 98 F (36.7 C) (Oral)  | Re
sp 16  | Ht 1.778 m (5' 10")  | Wt 68.7 kg (151 lb 7.3 oz)  | SpO2 98%  | BMI 21.73 kg/m 
 
 Data evaluation:
 
 Lab Results 
 Component Value Date 
  BUN 21 2018 
  CREATININE 4.3 (H) 2018 
  EGFR 10 (L) 2018 
   2018 
  K 4.4 2018 
  CL 95 (L) 2018 
  CO2 29 2018 
  CA 8.8 2018 
  PHOS 4.9 (H) 2018 
  MG 2.2 2018 
  ALB 2.5 (L) 2018 
  HGB 9.5 (L) 2018 
 
 Lab Results 
 Component Value Date 
 
  HGB 9.5 (L) 2018 
  HGB 8.9 (L) 2018 
  HGB 9.6 (L) 2018 
  FERRITIN 722 (H) 2017 
  FERRITIN 793 (H) 2016 
  FERRITIN 883 (H) 2016 
  LABIRON 28 2017 
  LABIRON 17 2016 
  LABIRON 31 2016 
 
 Lab Results 
 Component Value Date 
  ANIONGAP 15 2018 
 
 Us Lower Extremity Arterial Left 2018 showed Greater than 50% stenosis of the mid sup
erficial femoral artery. 2.  Monophasic single runoff to the ankle via the anterior tibial a
rtery.
 
 X-ray Foot Left 2018 showed amputation of the left forefoot at the level of the proxi
mal metatarsals. No radiographic evidence for osteomyelitis. Normal alignment of the hindfoo
t. Mild degeneration of the midfoot. 
 
 Assessment and Recommendations:
 
 1. ESRD on HD
 2. Chronic systolic CHF
 3. Anemia chronic renal failure
 4. Chronic anticoagulation with Apixaban
 5. PAD s/p left TMA
 6. Paroxysmal Afib
 7. CAD s/p CABG
 
 She is hemodynamically stable with no fever/tachycardia/bradycardia/hypotension or severe h
ypertension/hypoxia at rest.
 She is euvolemic.
 BP is ok.
 
  Plan To Dialyze per usual prescription TTS.
  UF as tolerated
  To use LOUISA/Fe use to keep Hb at target range of 10 to 11.
  FU with vascular surgery/ID/Podiatry regarding discharge needs.
  Continue current ABx for now
 
 She should be on a 2 gm Na, 2 gm K, 1 gm PO4, 1 gm/kg protein diet.
 Please dose all medications for an eGFR of less than 15 ml/min/1.73 m2.
 NSAIDS/HOPPER 2 inhibitors should be avoided. Aluminum/magnesium containing antacids and magne
sium/phosphate containing enemas should be avoided.
 Fluid should be restricted to less than 1.5 L in a 24 hr period.
 Strict I/O should be done.
 Daily CMP should be done (including Mg, PO4).
 Plan of care was discussed with Dr. Vincent.
 
 MICHELLE AWAD MD
 2018
 
 Portions of my previous notes have been carried over for continuity of care.
 She was seen earlier in the day and charting was completed later after rounds.
 Dictation software, Dragon, was used which may contain error for similar sounding words. Pe
rsonal communication is requested for any clarification.
 Prognosis is guarded in view of multiple comorbid illnesses and ESRD including but not limi
 
ashly to death.
 
   apixaban  2.5 mg Oral BID 
   aspirin  81 mg Oral Daily with breakfast 
   atorvastatin  20 mg Oral Nightly 
   calcium acetate  667 mg Oral TID WC 
   carvedilol  3.125 mg Oral BID WC 
   cefepime  2 g Intravenous Q48H 
   clopidogrel  75 mg Oral Daily 
   furosemide  20 mg Oral Daily 
   insulin glargine  20 Units Subcutaneous Nightly 
   insulin lispro (human)  0-10 Units Subcutaneous TID AC 
   insulin lispro (human)  0-5 Units Subcutaneous Nightly 
   isosorbide mononitrate  60 mg Oral Daily 
   losartan  100 mg Oral Daily 
   nortriptyline  25 mg Oral Daily 
   nortriptyline  75 mg Oral Nightly 
   oxybutynin  2.5 mg Oral BID 
   pantoprazole  40 mg Oral QAM AC 
   rOPINIRole  0.75 mg Oral Nightly 
   vancomycin  750 mg Intravenous See Admin Instructions 
 
   dextrose   
 
 Andrés Elliott MD - 2018  8:30 AM PSTFormatting of this note may be different from the 
original.
 
 
 CONSULT NOTE
 2018
 8:31 AM
 
 PRIMARY PHYSICIAN
 Ivy Couch
 
 CONSULT REQUEST FROM
 Winston Vincent DO
 
 HISTORY OF PRESENT ILLNESS
 The patient is a 68 y.o.-year-old female  with history of ESRD on HD via fistula Tue/Thu/Sa
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
 CABG. Recent LLE angioplasty few weeks ago. She had been on  antibiotics for L foot first d
igit infection with possible osteomyelitis since November (Oral levofloxacin and clindamycin
 then transitioned to IV cefepime with HD until 18, no improvement noted on antibiotics
). She had followed with podiatrist and underwent L TMA on 18, intraoperative findings
 encountered purulence at first metatarsal. 
 Admitted on 18 to [facility]. 
 Wound culture shows skin marzena.
 DAy 4 of iv vancomycin and cefepime given after each hemodialysis
 Will change to iv vancmycin 500 mg iv and ceftazidime 2 gm iv after each hemodialysis unitl
until 18 for the infected foot.  
 The patient has problem with tremors on the hands will discontinue the cefepime. 
 Past Medical History 
 Diagnosis Date 
   Acute MI ([facility])  
  with stenting x 1 
   Anemia associated with chronic renal failure 2016 
   Atrial fibrillation ([facility])  
   Chronic kidney disease  
   Congestive heart failure ([facility])  
 
   COPD (chronic obstructive pulmonary disease) ([facility])  
   COPD (chronic obstructive pulmonary disease) ([facility]) 2018 
   Coronary artery disease  
  stent placements: 3 total 
   Depression  
   Diabetes other 
  abstracted 
   Diabetes mellitus, type 2 ([facility])  
   ESRD on peritoneal dialysis ([facility])  
   GERD (gastroesophageal reflux disease)  
   Hemodialysis patient ([facility]) 10/2016 
  Stopped peritoneal and restarted hemodialysis 
   Hemorrhage of gastrointestinal tract, unspecified 2017 
  low GI, rectal bleeding 
   High blood pressure other 
  abstracted 
   High cholesterol other 
  abstracted 
   Hyperlipidemia  
   Hypertension  
   Hyponatremia 2017 
   Myocardial infarction ([facility]) 2017 
   Other chronic pain  
  feet,  
   Pain of left hip joint 2016 
  due to hip fracture and replacement 
   Partial small bowel obstruction ([facility]) 2017 
  resolved 
   Renal failure  
  Stage 5.   Fistula in the JAN - not on dialysis yet. 
   Unspecified visual disturbance  
  glasses 
 
 Past Surgical History 
 Procedure Laterality Date 
   AV FISTULA PLACEMENT   
  left upper arm AV fistula - failed 
   AV FISTULA REPAIR N/A 10/25/2016 
  Procedure: AV FISTULA - GRAFT REPAIR/REVISION;  Surgeon: Kirt Mclaughlin MD;  Location: [facility]
IN OR;  Service: Vascular;  Laterality: N/A;  Superficialization and revision of left arm fi
stula 
   CARDIAC CATHETERIZATION  2017 
   CARPAL TUNNEL RELEASE Bilateral other 
  abstracted 
   CATARACT EXTRACTION Bilateral 2007 
   CATHETER REMOVAL N/A 2016 
  Procedure: DIALYSIS CATHETER - REMOVAL;  Surgeon: Kirt Mclaughlin MD;  Location: [facility] MAIN OR; 
 Service: Vascular;  Laterality: N/A;  PD cath removal 
   CHOLECYSTECTOMY  other 
  abstracted 
   COLONOSCOPY   
   CORONARY ARTERY BYPASS GRAFT   
   CORONARY STENT PLACEMENT  2017 
  Drug Elutin stents to OM, 1 stent to prox. LAD 
   EYE SURGERY   
   FOOT AMPUTATION Left 2018 
  Procedure: FOREFOOT - AMPUTATION;  Surgeon: Srinivasan Jordan DPM;  Location: [facility] MAIN OR;
  Service: Podiatry;  Laterality: Left; 
   HARDWARE PRESENT   
  stent placements x 3, Left hip replacement, vascular stents 2 in left leg 
 
   HIP ARTHROPLASTY Left 2016 
  Procedure: HIP - ARTHROPLASTY(ALLISON);  Surgeon: Uriel Roa MD;  Location: [facility] MAIN 
OR;  Service: Orthopedics;  Laterality: Left; 
   HYSTERECTOMY   
  complete 
   PACEMAKER INSERTION   
  [employer] pacemaker/defib 
   PERITONEAL CATHETER INSERTION  8/15/2013 
   PERITONEAL CATHETER INSERTION N/A 2016 
  Procedure: LAPAROSCOPIC - PERITONEAL DIALYSIS CATH INSERTION;  Surgeon: Kirt Mclaughlin MD;  Lo
cation: [facility] MAIN OR;  Service: Vascular;  Laterality: N/A;  Removal of old catheter 
   SHOULDER SURGERY Right  
  frozen shoulder 
   UNLISTED PROCEDURE ARTHROSCOPY   
  total Left hip 
   vascular stents Left 2017 
  leg (2 stents) 
   VASCULAR SURGERY   
 
 Current Facility-Administered Medications 
 Medication Dose Route Frequency Provider Last Rate Last Dose 
   acetaminophen (TYLENOL) tablet 650 mg  650 mg Oral Q6H PRN Srinivasan Jordan DPM     
  Or 
   acetaminophen (TYLENOL) suppository 650 mg  650 mg Rectal Q6H PRN Srinivasan Jordan DPM 
    
   albumin human 25 % solution 12.5 g  12.5 g Intravenous Q1H PRN Michelle Awad MD   12.5
 g at 18 1041 
   albuterol (PROVENTIL HFA;VENTOLIN HFA) inhaler  2 puff Inhalation Q4H PRN Srinivasan Dhillon
an, DPM     
   apixaban (ELIQUIS) tablet 2.5 mg  2.5 mg Oral BID Srinivasan Jordan DPM   2.5 mg at 12/3
1/18 0746 
   aspirin EC tablet 81 mg  81 mg Oral Daily with breakfast Srinivasan Jordan DPM   81 mg a
t 18 0745 
   atorvastatin (LIPITOR) tablet 20 mg  20 mg Oral Nightly Srinivasan Jordan DPM   20 mg at
 18 
   calcium acetate (PHOSLO) capsule 667 mg  667 mg Oral TID  Srinivasan Jordan DPM   667 
mg at 18 0746 
   carvedilol (COREG) tablet 3.125 mg  3.125 mg Oral BID  Srinivasan Jordan DPM   3.125 m
g at 18 0746 
   ceFEPIme (MAXIPIME) IVPB 2 g  2 g Intravenous Q48H João Asher MD   2 g at 18 2
030 
   clopidogrel (PLAVIX) tablet 75 mg  75 mg Oral Daily Srinivasan Jordan DPM   75 mg at  0746 
   dextrose 10 % infusion   Intravenous Continuous PRN Srinivasanmario Jordan, DPM     
   dextrose 50 % solution 12 mL  12 mL Intravenous PRN Srinivasan Jordan, DPM     
   dextrose 50 % solution 25 mL  25 mL Intravenous PRN Srinivasan L Julian, DPM     
   furosemide (LASIX) tablet 20 mg  20 mg Oral Daily Srinivasanmario Jordan, DPM   20 mg at  0746 
   glucagon (GLUCAGEN) injection 0.5 mg  0.5 mg Intramuscular PRN Srinivasan Jordan, DPM    
 
   glucagon (GLUCAGEN) injection 1 mg  1 mg Intramuscular PRN Srinivasan Jordan, DPM     
   HYDROcodone-acetaminophen (NORCO) 5-325 MG per tablet 1 tablet  1 tablet Oral Q4H PRN B
axel Jordan DPM   1 tablet at 18 0646 
  Or 
   HYDROcodone-acetaminophen (NORCO)  MG per tablet 1 tablet  1 tablet Oral Q4H PRN 
Srinivasan Jordan, DPM   1 tablet at 18 1446 
   insulin glargine (LANTUS) injection 20 Units  20 Units Subcutaneous Nightly Augustin Bourne MD   20 Units at 18 2158 
   insulin lispro (human) (HUMALOG) injection 0-10 Units  0-10 Units Subcutaneous TID AC B
axel Jordan DPM   1 Units at 18 0629 
 
   insulin lispro (human) (HUMALOG) injection 0-5 Units  0-5 Units Subcutaneous Nightly Br
mario Jordan DPM   Stopped at 18 2133 
   isosorbide mononitrate (IMDUR) 24 hr tablet 60 mg  60 mg Oral Daily Srinivasan Jordan DP
M   60 mg at 18 0746 
   loperamide (IMODIUM) capsule 2 mg  2 mg Oral PRN Srinivasan Jordan, DPM     
   LORazepam (ATIVAN) tablet 1 mg  1 mg Oral Daily PRN Srinivasan Jordan DPM   1 mg at  0723 
   losartan (COZAAR) tablet 100 mg  100 mg Oral Daily Srinivasan Jordan, DPM   100 mg at  0746 
   nitroGLYCERIN (NITROSTAT) SL tablet 0.4 mg  0.4 mg Sublingual Q5 Min PRN Srinivasan navas, DPM   0.4 mg at 18 
   nortriptyline (PAMELOR) capsule 25 mg  25 mg Oral Daily GELACIO TerryM   25 mg at
 18 0745 
   nortriptyline (PAMELOR) capsule 75 mg  75 mg Oral Nightly João Asher MD   75 mg at 1
2023 
   ondansetron (ZOFRAN-ODT) disintegrating tablet 4 mg  4 mg Oral Q6H PRN GELACIO TerryM   4 mg at 18 0835 
  Or 
   ondansetron (ZOFRAN) injection 4 mg  4 mg Intravenous Q6H PRN Srinivasan Jordan DPM     
   oxybutynin (DITROPAN) tablet 2.5 mg  2.5 mg Oral BID GELACIO TerryM   2.5 mg at 1
 0745 
   pantoprazole (PROTONIX) EC tablet 40 mg  40 mg Oral QAM AC GELACIO TerryM   40 mg
 at 18 06 
   polyethylene glycol (GLYCOLAX) packet 17 g  17 g Oral Daily PRN Srinivasan Jordan, REBEKAH   
  
   prochlorperazine tablet 5 mg  5 mg Oral BID PRN Srinivasan Jordan DPM     
   rOPINIRole (REQUIP) tablet 0.75 mg  0.75 mg Oral Nightly GELACIO TerryM   0.75 mg
 at 18 
   sodium chloride (PF) 0.9 % flush 10 mL  10 mL Intravenous Q8H PRN GELACIO TerryM 
  10 mL at 18 0747 
   vancomycin (VANCOCIN) 750 mg/250 mL IVPB  750 mg Intravenous See Admin Instructions Rene
 H Frost, RPH     
 
 Allergies 
 Allergen Reactions 
   Quinapril Hcl Anaphylaxis 
   Digoxin And Related Other (See Comments) 
   toxic 
   Metaxalone Other (See Comments) 
   Morphine Mental Changes 
   Make her crazy/ "funny feeling in my head"
 Has used hydromorphone in-house 
   Pantoprazole Sodium Nausea and Vomiting 
   Penicillins Rash 
   Quinapril Hcl Edema 
   Facial Edema 
   Sudafed [Pseudoephedrine Hcl] Rash 
 
 Social History 
 
 Social History 
   Marital status:  
   Spouse name: N/A 
   Number of children: N/A 
   Years of education: N/A 
 
 Occupational History 
   Not on file. 
 
 Social History Main Topics 
 
   Smoking status: Former Smoker 
   Packs/day: 1.00 
   Years: 30.00 
   Types: Cigarettes 
   Quit date: 2004 
   Smokeless tobacco: Never Used 
    Comment: quite smoking  
   Alcohol use No 
   Drug use: No 
   Sexual activity: No 
 
 Other Topics Concern 
   Not on file 
 
 Social History Narrative 
  She lives with . 2 kids. Worked in banking 
 
 No smoking. No alcohol intake. Recreational Drug Use
 NO Travel
 mp Pets
 Immunization History 
 Administered Date(s) Administered 
   Hepatitis B Adult 2016 
   Influenza, Trivalent W/Preservative 2016 
   Pneumococcal Polysaccharide 23-valent 2012 
  
 
 Pneumococcal
 Influenza
 
 Family History 
 Problem Relation Age of Onset 
   High cholesterol Father  
   Hypertension Father  
   Diabetes Paternal Grandmother  
   Malig hypertherm Neg Hx  
   Kidney disease Neg Hx  
 
 REVIEW OF SYSTEMS
 
 General: The patient noted to have no  problem of fever,no  weight loss
  and no chills.
 
 Neurologic: Denies any headache, any lightheadedness. No loss of
 
 consciousness or seizure.
 
 Cardiac: with  Chest Pain,with  Palpitation,no  Dyspnea on Exertion
 
 Pulmonary: Denies cough, wheezing and hemoptysis
 
 GASTROINTESTINAL: Denies nausea vomiting, and diarrhea.
 GENITOURINARY: Noted no dysuria, urgency, or frequency.
 Hematological : Denies any ecchymosis, bleeding or hematuria
 
 Endocrine: Denies any polyphagia, polyuria or hot and cold intolerance
  Musculoskeletal: no new joint pain and swelling. 
 Skin : Denies any new rashes or cutaneous changes.
 
 Rest of the review of systems is unremarkable.
 
 
 PHYSICAL EXAMINATION
 
 VITAL SIGNS 
 Wt Readings from Last 3 Encounters: 
 18 68.7 kg (151 lb 7.3 oz) 
 18 65.8 kg (145 lb 1 oz) 
 18 65.9 kg (145 lb 4.5 oz) 
 
 Temp Readings from Last 3 Encounters: 
 18 98.6 F (37 C) (Oral) 
 18 98.2 F (36.8 C) (Oral) 
 18 97.9 F (36.6 C) (Oral) 
 
 BP Readings from Last 3 Encounters: 
 18 122/57 
 18 139/63 
 18 111/61 
 
 Pulse Readings from Last 3 Encounters: 
 18 77 
 18 74 
 18 85 
 
 Head:  Normocephalic atraumatic
 
 HEENT: Pink palpebral conjunctivae. Anicteric sclerae. No
 tonsillopharyngeal congestion.
 
 Neck : Noted no  Cervical Lymphadenopathy
 
 CHEST:Clear Breath Sounds Bilateral . 
 
 HEART: Regular rate and rhythm. No murmurs thrills or rubs
 
 ABDOMEN: Soft, flat. No tenderness. No hepatosplenomegaly.
 
 GROIN AREA: Negative  for intertrigo.
 
 EXTREMITIES: upper extremity no lesion s.
 Right lower extremities no edema. Left foot with dressing.
  
 
 NEUROLOGIC: No localizing signs.
 
 Skin : NO rash or ecchymosis. 
 
 LABORATORY DATA
 
 Lab Results 
 Component Value Date 
  WBC 7.20 2018 
  HGB 9.5 (L) 2018 
  HCT 28.7 (L) 2018 
   (L) 2018 
  CHOL 101 2017 
  TRIG 132 2017 
  HDL 34 (L) 2017 
  ALT 13 2018 
  AST 24 2018 
 
   2018 
  K 4.4 2018 
  CL 95 (L) 2018 
  CREATININE 4.3 (H) 2018 
  BUN 21 2018 
  CO2 29 2018 
  TSH 1.14 2017 
  INR 1.0 2018 
  GLUF 92 2018 
  HGBA1C 7.5 (H) 2018 
 
 Hepatic Function Panel:  
 Lab Results 
 Component Value Date 
  PROT 6.3 2018 
  ALB 2.5 (L) 2018 
  BILITOT 0.6 2018 
  BILIDIR 0.1 2017 
  ALP 76 2018 
  AST 24 2018 
  ALT 13 2018 
  
 
 Lipase: 
 Lab Results 
 Component Value Date 
  LIPASE 332 2017 
  
 U/A:  
 Lab Results 
 Component Value Date 
  COLORU YELLOW 2011 
  CLARITYU Slightly Cloudy 10/16/2018 
  MEROPENEM 1.009 2013 
  LEUKOCYTESUR  10/16/2018 
    Comment: 
    small 
  NITRITE Negative 10/16/2018 
  UROBILINOGEN Normal 10/16/2018 
  UPRO  10/16/2018 
    Comment: 
    100 
  UPRO 100 2013 
  PHUR 8 10/16/2018 
  BLOODU Negative 10/16/2018 
  KETONES negative 10/16/2018 
  BILIRUBINUR Negative 10/16/2018 
  GLUCOSEU  10/16/2018 
    Comment: 
    moderate 
  
 DIAGNOSTIC IMAGING
 
 CAT scan 
 
 Ultrasound
   
 Xr Chest 2 View
 
 Result Date: 2018
 
 1.  Mild diffuse pulmonary edema, similar to the prior exam. 2.  Left perihilar airspace op
acity which may represent edema. Recommend repeat chest x-ray in 6-8 weeks to verify resolut
ion. 3.  Small left pleural effusion and/or pleural scarring, with adjacent atelectasis or c
onsolidation, similar to the prior exam. Small right pleural effusion. 4.  Peripheral opacit
y within the left lower hemithorax, slightly increased which is questionable for a loculated
 pleural fluid, parenchymal opacity, or artifact. Electronically signed by Chau Garcia on 1
2018 5:33 PM
 
 X-ray Foot Left
 
 Result Date: 2018
 Amputation of the left forefoot at the level of the proximal metatarsals. Surgical cutaneou
s staples are present. No radiographic evidence for osteomyelitis. Normal alignment of the h
indfoot. Mild degeneration of the midfoot. Electronically signed by Oleksandr Lemus MD on 2018 10:12 AM
 
 Ct Head Without Contrast
 
 Result Date: 2018
 1.  No acute intracranial pathology. Electronically signed by Steven Samano MD on  5:28 PM
 
 Nm Myocardial Perfusion Spect (stress And Rest)
 
 Result Date: 2018
 1.  Mild to moderate left ventricular dilatation, unchanged between rest and stress. 2.  I 
cannot exclude mid anterior wall infarct. 3.  I see no evidence of ischemia. 4.  Global mode
rate hypokinesis. 5.  LVEF 29% stress, 33% rest. Using the risk stratification system at our
 institution, this examination equates to at least a high risk based on this imaging, arisin
g from the criteria below (1). Note that this should be interfaced with clinical and other d
patrizia, potentially affecting final categorization. American Heart Association and American Col
lege of Cardiology Scientific Statement. Circulation (2008); 118: p 4828-3997 Electronically
 signed by Wilfrido Knight MD on 2018 4:34 PM
 
 Ir Angiogram Extremity Left
 
 Result Date: 2018
 1. Aorta with narrow aortic bifurcation with moderate stenosis of proximal left common errol
c artery. 2. Left SFA with moderate stenosis proximally. 3. Single vessel runoff to left jeanne
t via left anterior tibial artery. 4. Left posterior tibial artery and peroneal artery were 
occluded with reconstitution of distal posterior tibial artery at the left ankle via collate
rals. 5. If forefoot amputation does not heal, may need popliteal to posterior tibial artery
 bypass versus antegrade access of left common femoral artery with posterior tibial artery r
ecanalization. Electronically signed by Kirt Mclaughlin MD on 2018 10:51 AM
 
 Cherie Villalobos is a 68 y.o. female with the following Problems 
 Patient Active Problem List 
 Diagnosis 
   Proteinuria 
   Vitamin D deficiency 
   Secondary hyperparathyroidism (HCC) 
   Recurrent UTI 
   Coronary artery disease involving native coronary artery of native heart without angina
 pectoris 
   Depression 
   ESRD (end stage renal disease) ([facility]) 
   Type 2 diabetes mellitus with chronic kidney disease on chronic dialysis, with long-ter
m current use of insulin (HCC) 
   Anemia in ESRD (end-stage renal disease) ([facility]) 
   Hypertension, essential 
 
   Anemia of chronic renal failure, stage 5 ([facility]) 
   Dyslipidemia 
   Gastroesophageal reflux disease without esophagitis 
   Diabetic foot ulcer ([facility]) 
   Loss of weight 
   Dysphagia, unspecified 
   Foot ulcer due to DM  ([facility]) 
   Severe protein-calorie malnutrition (HCC) 
   Osteomyelitis of left foot (HCC) 
   Pleural effusion 
   Prolonged Q-T interval on ECG 
   Chronic systolic congestive heart failure (HCC) 
   Chronic diarrhea 
   Chronic anticoagulation 
   Plantar ulcer (HCC) 
   Cardiomyopathy (HCC) 
   COPD (chronic obstructive pulmonary disease) (HCC) 
   ICD (implantable cardioverter-defibrillator) in place 
   Implantable defibrillator reprogramming/check 
   Mixed hyperlipidemia 
   Moderate protein-calorie malnutrition (HCC) 
   Paroxysmal atrial fibrillation (HCC) 
   S/P CABG x 2 
   S/P mitral valve repair 
   Severe mitral regurgitation 
   Steroid-induced hyperglycemia 
   Stress hyperglycemia 
   Chronic multifocal osteomyelitis of left foot (HCC) 
   PVD (peripheral vascular disease) (HCC) 
   CAD (coronary artery disease) 
   PVD (peripheral vascular disease) ([facility]) 
 
 Plan:
 
 CBC CMP ESR CRP 
 q Monday 
 Vancomycin 500 mg iv and ceftazidime 2 gm after each hemodialysis
 
 RECOMMENDATIONS
 Start discharge planning for iv vancomycin and cefrazidime for 6 weeks unitil 19 
 
 Discussed with Dr. Vincent hospitalist  who agreed and will proceed
 As plan.
 Thank you very much for the consult.
 ______________________
 
 ______________________
 MD Alonzo FRANKLIN Saruzma AYALA RP - 2018  6:43 PM PSTFormatting of this note may be different from 
the original.
 Initiation of Vancomycin 
 Pharmacy Dosing 
 Cherie Villalobos 68 y.o. female
 1.778 m (5' 10")
 68.7 kg (151 lb 7.3 oz)
 Body mass index is 21.73 kg/m.
 Ideal body weight: 68.5 kg (151 lb 0.2 oz)
 Adjusted ideal body weight: 68.6 kg (151 lb 3.1 oz) 
 CREATININE 
 
 Date Value Ref Range Status 
 2018 4.3 (H) 0.50 - 1.00 mg/dL Final 
 
 Estimated CrCl : Serum creatinine: 4.3 mg/dL (H) 18 0547
 Estimated creatinine clearance: 13.4 mL/min (A)
 Indications: Osteomylelitis. 
 
 Dose per Protocol: 
 Loading Dose: Vancomycin 1000 mg (14.6 mg/kg TBW) IV once per Dr. BRIAN Peguero ([phone]
[phone])
 
 Will give Vancomycin 750 mg in the last hour or directly after each session of hemodialysis
.  
 
 Hemodialysis scheduled for every Tuesday, Thursday, and Saturday.
 
 First Dose to be Given: 2018 @ 1745
 
 Vancomycin Random Due: 2018 with AM draw.  
 
 Drawing level with AM labs since the load is less than would have been done per protocol fo
r a patient < 90 kg.
 
 Goal Trough for Vancomycin: 15-20 mcg/mL
 Pharmacist: Brenna Rosado
 2018 5:47 PM
 
 Michael Peguero MD - 2018  4:55 PM PSTFormatting of this note may be different f
rom the original.
 Infectious Disease
 Progress Note
 
 Hospital Day:   LOS: 2 days 
 SUBJECTIVE
 Doing well in general. No fever, rash, NV or diarrhea. 
 
 Antibiotics:
 1. IV cefepime with HD
 
 Allergy:
 Allergies 
 Allergen Reactions 
   Quinapril Hcl Anaphylaxis 
   Digoxin And Related Other (See Comments) 
   toxic 
   Metaxalone Other (See Comments) 
   Morphine Mental Changes 
   Make her crazy/ "funny feeling in my head"
 Has used hydromorphone in-house 
   Pantoprazole Sodium Nausea and Vomiting 
   Penicillins Rash 
   Quinapril Hcl Edema 
   Facial Edema 
   Sudafed [Pseudoephedrine Hcl] Rash 
 
 OBJECTIVE
 Vital Signs:
 BP 91/52 (BP Location: Right upper arm)  | Pulse 69  | Temp 98.5 F (36.9 C) (Oral)  | R
gopal 16  | Ht 1.778 m (5' 10")  | Wt 68.7 kg (151 lb 7.3 oz)  | SpO2 97%  | BMI 21.73 kg/m 
 
 
 Examination:
 
 GA: Patient is alert and oriented. Appears well-developed, not in distress. 
 HEENT: 
 Head: Normocephalic and Atraumatic. 
 Nose: Nose normal. 
 Neck: Neck supple. No palpable mass. 
 Cardiovascular: Regular rate and rhythm, no gallops. 
 Pulmonary/Chest:  Symmetrical chest expansion, no labored breathing. 
 Abdominal: Soft, bowel sounds are present, no distension, no ascites. 
 Musculoskeletal: LLE on dressing.  
 Neurological:  No focal neurologic deficits. Oriented well to place time and person. 
 Skin: Skin is warm and dry. No rash noted.
 Psychiatric: No psychosis, reported normal mood. Judgment normal. 
 
 LABS:
 
 Results  
  Procedure Component Value Units Date/Time 
  Anaerobic Culture W/Gram Stain [[id number]] Collected:  18 1320 
  Specimen:  Wound Updated:  18 1029 
   Specimen Description WOUND 
   SPECIAL REQUESTS FIRST METATARSAL LEFT FOOT 
   GRAM STAIN NO CELLS OR ORGANISMS SEEN 
   CULTURE 1+ 
    NORMAL SKIN MARZENA ISOLATED 
  
 
 ASSESSMENT & PLAN
 69 yo female, ESRD on HD, CAD post CABG, PVD post LLE angioplasty few weeks ago. Chronic L 
foot infection, post TMA on 18, concerning for residual infection. Preliminary surgica
l culture grew skin marzena only. Discussed with TCL, culture would be finalized tomorrow. 
 
 Recommendations
 
 1. Continue IV cefepime after HD sessions.
 2. Start IV vancomycin now and continue after HD sessions.
 3. Await final surgical culture
 4. Await bone pathology
 5. Weekly CBC, ESR, CRP on antibiotics
 6. Plan to de-escalate antibiotics based on final surgical culture. 
 
 Plan discussed with patient. She understands and agree with plan. 
 
 Michael Peguero MD
 2018
 
 Moiz Josue MD - 2018  1:11 PM PSTFormatting of this note may be different
 from the original.
 [facility]  
 Service:  Hospitalist
 Progress Note
 
 Hospital Day:   LOS: 2 days 
 Post-Op Day:  1 Day Post-Op
 SUBJECTIVE
 
      Patient Summary:    Ms. Villalobos is a 68-year-old female who has history of end-stage re
nal disease on hemodialysis 3 times a week, type 2 diabetes mellitus, hypertension, peripher
al arterial disease status post angioplasty of left lower extremity, coronary artery disease
 
 status post CABG, status post aortic valve replacement in 2018, heart failure with reduc
ed ejection fraction status post AICD placement, atrial fibrillation on apixaban developed i
nfection of left first metatarsal bone and underwent left transmetatarsal amputation yesterd
ay by Dr. Jordan.  During surgery, Dr. Jordan thought that patient has significant osteomy
elitis of foot, hence she was admitted under the care of hospitalist and is getting IV cefep
reyna with hemodialysis, recommendations of Dr. Asher.
 
      Events Overnight:  Patient C/O minimal left foot pain. She denies any other symptoms. 
She was dialyzed yesterday. 
 
 Scheduled Medications 
   apixaban  2.5 mg Oral BID 
   aspirin  81 mg Oral Daily with breakfast 
   atorvastatin  20 mg Oral Nightly 
   calcium acetate  667 mg Oral TID WC 
   carvedilol  3.125 mg Oral BID WC 
   cefepime  2 g Intravenous Q48H 
   clopidogrel  75 mg Oral Daily 
   furosemide  20 mg Oral Daily 
   insulin glargine  20 Units Subcutaneous Nightly 
   insulin lispro (human)  0-10 Units Subcutaneous TID AC 
   insulin lispro (human)  0-5 Units Subcutaneous Nightly 
   isosorbide mononitrate  60 mg Oral Daily 
   losartan  100 mg Oral Daily 
   nortriptyline  25 mg Oral Daily 
   nortriptyline  75 mg Oral Nightly 
   oxybutynin  2.5 mg Oral BID 
   pantoprazole  40 mg Oral QAM AC 
   rOPINIRole  0.75 mg Oral Nightly 
 
 Continuous Infusions 
   dextrose   
 
 PRN Medications
 acetaminophen **OR** acetaminophen, albumin human, albuterol, dextrose, dextrose, dextrose,
 glucagon, glucagon, HYDROcodone-acetaminophen **OR** HYDROcodone-acetaminophen, loperamide,
 LORazepam, nitroGLYCERIN, ondansetron **OR** ondansetron, polyethylene glycol, prochlorpera
zine, saline lock IV - when tolerating PO fluids **AND** sodium chloride (PF)
 
 OBJECTIVE
 Vital Signs:
 /53 (BP Location: Right upper arm)  | Pulse 76  | Temp 98.4 F (36.9 C) (Oral)  | 
Resp 16  | Ht 1.778 m (5' 10")  | Wt 68.7 kg (151 lb 7.3 oz)  | SpO2 100%  | BMI 21.73 kg/m
 
 Temp:  [98.3 F (36.8 C)-100.1 F (37.8 C)] 98.4 F (36.9 C) ( 1050)
 BP: (100-127)/(53-59) 108/53 ( 1050)
 Heart Rate:  [72-98] 76 ( 1050)
 Resp:  [16-18] 16 ( 1050)
 SpO2:  [91 %-100 %] 100 % ( 1050)
 I&O Last 3 Shifts:   1900 -  0659
 In: 1895 [P.O.:995]
 Out: 2400 
 
 Physical Exam 
 Constitutional: She is oriented to person, place, and time. She appears well-developed and 
well-nourished. No distress. 
 HENT: 
 Head: Normocephalic. 
 Mouth/Throat: Oropharynx is clear and moist. No oropharyngeal exudate. 
 Eyes: Pupils are equal, round, and reactive to light. Conjunctivae are normal. No scleral i
 
cterus. 
 Neck: Neck supple. No JVD present. 
 Cardiovascular: Normal rate, regular rhythm and normal heart sounds.  Exam reveals no antunez
p and no friction rub.  
 Pulmonary/Chest: Effort normal and breath sounds normal. No respiratory distress. She has n
o wheezes. She has no rales. She exhibits no tenderness. 
 Abdomina/Gl: Soft. Bowel sounds are normal. She exhibits no distension and no mass. There i
s no tenderness. There is no rebound and no guarding. No hernia. 
 Musculoskeletal: 
 Left foot covered with dressing. 
 No right pedal edema.  
 Neurological: She is alert and oriented to person, place, and time. No cranial nerve defici
t. 
 Skin: Skin is warm and dry. No rash noted. She is not diaphoretic. No erythema. There is pa
llor. 
 Psychiatric: She has a normal mood and affect. Her behavior is normal. Judgment and thought
 content normal. 
 Nursing note and vitals reviewed.
 
 DATA
 
 CBC:  
 Lab Results 
 Component Value Date 
  WBC 7.20 2018 
  RBC 2.75 (L) 2018 
  HGB 9.5 (L) 2018 
  HCT 28.7 (L) 2018 
  .3 (H) 2018 
  MCH 34.5 (H) 2018 
  MCHC 33.1 2018 
  RDW 61.7 (H) 2018 
   (L) 2018 
  MPV 8.3 2018 
  DIFFTYPE AUTOMATED 2018 
 
 CMP:  
 Lab Results 
 Component Value Date 
   2018 
  K 4.4 2018 
  CL 95 (L) 2018 
  CO2 29 2018 
  ANIONGAP 15 2018 
  GLUF 92 2018 
  BUN 21 2018 
  CREATININE 4.3 (H) 2018 
  BCR 5 2018 
  CA 8.8 2018 
  CA 8.7 2016 
  PROT 6.3 2018 
  ALB 2.5 (L) 2018 
  GLOB 3.7 2018 
  BILITOT 0.6 2018 
  ALP 76 2018 
  AST 24 2018 
  ALT 13 2018 
  EGFR 10 (L) 2018 
 
 X-ray Foot Left
 
 
 Result Date: 2018
 Amputation of the left forefoot at the level of the proximal metatarsals. Surgical cutaneou
s staples are present. No radiographic evidence for osteomyelitis. Normal alignment of the h
indfoot. Mild degeneration of the midfoot. Electronically signed by Oleksandr Lemus MD on 2018 10:12 AM
 
 PROBLEM LIST
 
 Principal Problem:
   Osteomyelitis of left foot ([facility])
 Active Problems:
   Depression
   ESRD (end stage renal disease) ([facility])
   Type 2 diabetes mellitus with chronic kidney disease on chronic dialysis, with long-term 
current use of insulin (HCC)
   Anemia in ESRD (end-stage renal disease) ([facility])
   Hypertension, essential
   Anemia of chronic renal failure, stage 5 (HCC)
   Diabetic foot ulcer (HCC)
   Chronic systolic congestive heart failure (HCC)
   Chronic anticoagulation
   Cardiomyopathy (HCC)
   Paroxysmal atrial fibrillation (HCC)
   PVD (peripheral vascular disease) (HCC)
   CAD (coronary artery disease)
   PVD (peripheral vascular disease) ([facility])
 
 Patient diagnosed with:  , and I agree with the following nutritional recommendations:
  
 
 ASSESSMENT & PLAN
 
 Osteomyelitis of left foot SP left TMA by Dr. Jordan on 18. Bone culture grew normal
 skin marzena. However bone pathology is pending. Patient is receiving cefepime with each HD p
er nephrology and ID recommendations. Dr. Jordan is following patient in the hospital.  ID 
consult from Dr. peguero appreciated. Will follow recommendations regarding use of future
 antibiotic.  
 
 Phantom pain.Resolved.
 
 ESRD. On HD every T-T-S. Last HD yesterday. Appreciate help from nephrology services. 
 
 Type II DM with complications. Most recent blood sugars have been in acceptable range . She
 is on Lantus and SSI. Last glycohemoglobin A1c was 7.5 on 18. Will continue current r
egimen of Lantus and Humalog. 
 
 CAD SP CABG, paroxysmal atrial fibrillation, HFrEF. All cardiac problems are under control.
 Will continue apixaban, carvedilol, aspirin, Plavix, losartan and Imdur. 
 
 History of PAD. SP left leg angioplasty. On Plavix and aspirin. 
 
 Anemia of chronic kidney disease. Management per nephrology services. 
 
 Discussed with Dr. Jordan yesterday. She will  need SNF transfer upon discharge.
 
 Disposition:  Likely discharge to SNF on 18. 
 Code Status:  Prior
 
 Moiz Josue MD
 
 2018PiMoiz haynes MD - 2018  1:45 PM PSTFormatting of this note may be
 different from the original.
 [facility]  
 Service:  Hospitalist
 Progress Note
 
 Hospital Day:   LOS: 1 day 
 Post-Op Day:  1 Day Post-Op
 SUBJECTIVE
 
      Patient Summary:    Ms. Villalobos is a 68-year-old female who has history of end-stage re
nal disease on hemodialysis 3 times a week, type 2 diabetes mellitus, hypertension, peripher
al arterial disease status post angioplasty of left lower extremity, coronary artery disease
 status post CABG, status post aortic valve replacement in 2018, heart failure with reduc
ed ejection fraction status post AICD placement, atrial fibrillation on apixaban developed i
nfection of left first metatarsal bone and underwent left transmetatarsal amputation yesterd
ay by Dr. Jordan.  During surgery, Dr. Jordan thought that patient has significant osteomy
elitis of foot, hence she was admitted under the care of hospitalist and is getting IV cefep
reyna with hemodialysis, recommendations of Dr. Asher.
 
      Events Overnight:  Patientvstates left foot pain is improving. Denies any other sympto
ms. She is being dialyzed today. 
 
 Scheduled Medications 
   apixaban  2.5 mg Oral BID 
   aspirin  81 mg Oral Daily with breakfast 
   atorvastatin  20 mg Oral Nightly 
   calcium acetate  667 mg Oral TID WC 
   carvedilol  3.125 mg Oral BID WC 
   cefepime  2 g Intravenous Q48H 
   clopidogrel  75 mg Oral Daily 
   furosemide  20 mg Oral Daily 
   insulin glargine  20 Units Subcutaneous Nightly 
   insulin lispro (human)  0-10 Units Subcutaneous TID AC 
   insulin lispro (human)  0-5 Units Subcutaneous Nightly 
   isosorbide mononitrate  60 mg Oral Daily 
   losartan  100 mg Oral Daily 
   nortriptyline  25 mg Oral Daily 
   nortriptyline  75 mg Oral Nightly 
   oxybutynin  2.5 mg Oral BID 
   pantoprazole  40 mg Oral QAM AC 
   rOPINIRole  0.75 mg Oral Nightly 
   Vortioxetine HBr  10 mg Oral Nightly 
 
 Continuous Infusions 
   dextrose   
 
 PRN Medications
 acetaminophen **OR** acetaminophen, albumin human, albuterol, dextrose, dextrose, dextrose,
 glucagon, glucagon, HYDROcodone-acetaminophen **OR** HYDROcodone-acetaminophen, loperamide,
 LORazepam, nitroGLYCERIN, ondansetron **OR** ondansetron, polyethylene glycol, prochlorpera
zine, saline lock IV - when tolerating PO fluids **AND** sodium chloride (PF)
 
 OBJECTIVE
 Vital Signs:
 /60  | Pulse 83  | Temp 98.3 F (36.8 C)  | Resp 18  | Ht 1.778 m (5' 10")  | Wt 6
8.7 kg (151 lb 7.3 oz)  | SpO2 99%  | BMI 21.73 kg/m 
 Temp:  [98.2 F (36.8 C)-99.7 F (37.6 C)] 98.3 F (36.8 C) ( 121)
 BP: ()/(47-63) 136/60 ( 1209)
 Heart Rate:  [68-84] 83 ( 121)
 
 Resp:  [16-18] 18 ( 121)
 SpO2:  [93 %-100 %] 99 % ( 121)
 I&O Last 3 Shifts:   1900 -  0659
 In: 2855 [P.O.:955]
 Out: 2700 
 
 Physical Exam 
 Constitutional: She is oriented to person, place, and time. She appears well-developed and 
well-nourished. No distress. 
 HENT: 
 Head: Normocephalic. 
 Mouth/Throat: Oropharynx is clear and moist. No oropharyngeal exudate. 
 Eyes: Pupils are equal, round, and reactive to light. Conjunctivae are normal. No scleral i
cterus. 
 Neck: Neck supple. No JVD present. 
 Cardiovascular: Normal rate, regular rhythm and normal heart sounds.  Exam reveals no antunez
p and no friction rub.  
 Pulmonary/Chest: Effort normal and breath sounds normal. No respiratory distress. She has n
o wheezes. She has no rales. She exhibits no tenderness. 
 Abdomina/Gl: Soft. Bowel sounds are normal. She exhibits no distension and no mass. There i
s no tenderness. There is no rebound and no guarding. No hernia. 
 Musculoskeletal: 
 Left foot covered with dressing. 
 No right pedal edema.  
 Neurological: She is alert and oriented to person, place, and time. No cranial nerve defici
t. 
 Skin: Skin is warm and dry. No rash noted. She is not diaphoretic. No erythema. There is pa
llor. 
 Psychiatric: She has a normal mood and affect. Her behavior is normal. Judgment and thought
 content normal. 
 Nursing note and vitals reviewed.
 
 DATA
 
 CBC:  
 Lab Results 
 Component Value Date 
  WBC 5.62 2018 
  RBC 2.57 (L) 2018 
  HGB 8.9 (L) 2018 
  HCT 27.0 (L) 2018 
  .1 (H) 2018 
  MCH 34.6 (H) 2018 
  MCHC 32.9 2018 
  RDW 63.0 (H) 2018 
   (L) 2018 
  MPV 8.4 2018 
  DIFFTYPE AUTOMATED 2018 
 
 CMP:  
 Lab Results 
 Component Value Date 
   2018 
  K 4.9 2018 
  CL 99 2018 
  CO2 23 2018 
  ANIONGAP 20 2018 
  GLUF 138 (H) 2018 
  BUN 34 (H) 2018 
  CREATININE 6.6 (H) 2018 
 
  BCR 5 2018 
  CA 8.7 2018 
  CA 8.7 2016 
  PROT 6.1 (L) 2018 
  ALB 2.2 (L) 2018 
  GLOB 3.9 2018 
  BILITOT 0.4 2018 
  ALP 82 2018 
  AST 16 2018 
  ALT 14 2018 
  EGFR 6 (L) 2018 
 
 X-ray Foot Left
 
 Result Date: 2018
 Amputation of the left forefoot at the level of the proximal metatarsals. Surgical cutaneou
s staples are present. No radiographic evidence for osteomyelitis. Normal alignment of the h
indfoot. Mild degeneration of the midfoot. Electronically signed by Oleksandr Lemus MD on 2018 10:12 AM
 
 PROBLEM LIST
 
 Principal Problem:
   Osteomyelitis of left foot (HCC)
 Active Problems:
   Depression
   ESRD (end stage renal disease) (HCC)
   Type 2 diabetes mellitus with chronic kidney disease on chronic dialysis, with long-term 
current use of insulin (HCC)
   Anemia in ESRD (end-stage renal disease) ([facility])
   Hypertension, essential
   Anemia of chronic renal failure, stage 5 (HCC)
   Diabetic foot ulcer (HCC)
   Chronic systolic congestive heart failure (HCC)
   Chronic anticoagulation
   Cardiomyopathy (HCC)
   Paroxysmal atrial fibrillation (HCC)
   PVD (peripheral vascular disease) (HCC)
   CAD (coronary artery disease)
   PVD (peripheral vascular disease) ([facility])
 
 Patient diagnosed with:  , and I agree with the following nutritional recommendations:
  
 
 ASSESSMENT & PLAN
 
 Osteomyelitis of left foot SP left TMA by Dr. Jordan on 18. Bone culture grew normal
 skin marzena. Patient is receiving cefepime with each HD per nephrology recommendations. Dr. Jordan to follow patient today.  ID consult from Dr. peguero appreciated. Will follow re
commendations regarding use of future antibiotic. 
 
 Phantom pain. Improved. 
 
 ESRD. On HD every T-T-S. Currently getting HD. Appreciate help from nephrology services. 
 
 Type II DM with complications. Most blood sugars have improved and last 4 readings are betw
een 164-125. . She is on Lantus and SSI. Last glycohemoglobin A1c was 7.5 on 18. Will 
continue current regimen of Lantus and Humalog. 
 
 CAD SP CABG, paroxysmal atrial fibrillation, HFrEF. All cardiac problems are under control.
 
 Will continue apixaban, carvedilol, aspirin, Plavix, losartan and Imdur. 
 
 History of PAD. SP left leg angioplasty. On Plavix and aspirin. 
 
 Anemia of chronic kidney disease. Management per nephrology services. 
 
 Discussed with Dr. Jordan today. She will  need SNF transfer upon discharge.
 
 Disposition:  Likely discharge to SNF on 18. 
 Code Status:  Prior
 
 Moiz MAN Josue MD
 2018Michael Peguero MD - 2018  9:20 AM PSTFormatting of this note may be d
ifferent from the original.
 Infectious Disease
 Progress Note
 
 Hospital Day:   LOS: 1 day 
 SUBJECTIVE
 No fever, no NV, no new skin rash
 
 Antibiotics:
 
 Allergy:
 Allergies 
 Allergen Reactions 
   Quinapril Hcl Anaphylaxis 
   Digoxin And Related Other (See Comments) 
   toxic 
   Metaxalone Other (See Comments) 
   Morphine Mental Changes 
   Make her crazy/ "funny feeling in my head"
 Has used hydromorphone in-house 
   Pantoprazole Sodium Nausea and Vomiting 
   Penicillins Rash 
   Quinapril Hcl Edema 
   Facial Edema 
   Sudafed [Pseudoephedrine Hcl] Rash 
 
 OBJECTIVE
 Vital Signs:
 BP 90/51  | Pulse 70  | Temp 98.2 F (36.8 C) (Oral)  | Resp 18  | Ht 1.778 m (5' 10")  
| Wt 68.7 kg (151 lb 7.3 oz)  | SpO2 97%  | BMI 21.73 kg/m 
 
 Examination:
 
 General: Patient is alert and oriented. Appears well-developed and nourished. 
 HEENT: 
 Head: Normocephalic and Atraumatic. 
 Nose: Nose normal. 
 Mouth/Throat: Oropharynx is clear and moist.
 Eyes: No conjunctiva injection. EOM Intact. PERRLA. No scleral icterus. 
 Neck: Neck supple. No JVD present. No tracheal deviation present. No thyromegaly 
 Cardiovascular: Regular rate and rhythm, no murmur heard and no friction rub.
 Pulmonary/Chest:  Symmetrical chest expansion, no stridor, no wheezes and no rales. 
 Abdominal: Soft, bowel sounds are present, no distension, no ascites. There is no rebound t
enderness and no guarding. 
 Musculoskeletal: L foot on dressing
 Neurological:  No focal weaness
 Skin: Skin is warm and dry. No rash noted. No erythema. No pallor. 
 
 Psychiatric: No psychosis, reported normal mood. Judgment normal. 
 
 LABS:
 Recent Results (from the past 24 hour(s)) 
 POCT glucose 
  Collection Time: 18 11:14 AM 
 Result Value Ref Range 
  GLUCOSE,POC SCREEN 235 (H) 65 - 99 mg/dL 
 POCT glucose 
  Collection Time: 18  4:31 PM 
 Result Value Ref Range 
  GLUCOSE,POC SCREEN 277 (H) 65 - 99 mg/dL 
 POCT glucose 
  Collection Time: 18  9:07 PM 
 Result Value Ref Range 
  GLUCOSE,POC SCREEN 164 (H) 65 - 99 mg/dL 
 POCT glucose 
  Collection Time: 18  5:09 AM 
 Result Value Ref Range 
  GLUCOSE,POC SCREEN 125 (H) 65 - 99 mg/dL 
 CBC W/Auto Diff (Reflex to Manual) 
  Collection Time: 18  5:50 AM 
 Result Value Ref Range 
  WBC 5.62 3.80 - 11.00 K/uL 
  RBC 2.57 (L) 3.70 - 5.10 M/uL 
  HGB 8.9 (L) 11.3 - 15.5 g/dL 
  HCT 27.0 (L) 34.0 - 46.0 % 
  .1 (H) 80.0 - 100.0 fl 
  MCH 34.6 (H) 27.0 - 34.0 pg 
  MCHC 32.9 32.0 - 35.5 g/dL 
  RDW SD 63.0 (H) 37 - 53 fl 
   (L) 150 - 400 K/uL 
  MPV 8.4 fl 
  DIFF TYPE AUTOMATED  
  NEUTROPHILS 76.00 % 
  LYMPHOCYTES 13.76 % 
  MONOCYTES 9.79 % 
  EOSINOPHILS 0.03 % 
  BASOPHILS 0.42 % 
  NEUTROPHILS ABS 4.27 1.90 - 7.40 K/uL 
  LYMPHOCYTES ABS 0.77 (L) 1.00 - 3.90 K/uL 
  MONOCYTES ABS 0.55 0.00 - 0.80 K/uL 
  EOSINOPHILS ABS 0.00 0.00 - 0.50 K/uL 
  BASOPHILS ABS 0.02 0.00 - 0.10 K/uL 
 Comprehensive metabolic panel 
  Collection Time: 18  5:50 AM 
 Result Value Ref Range 
  SODIUM 137 135 - 145 mmol/L 
  POTASSIUM 4.9 3.5 - 4.9 mmol/L 
  CHLORIDE 99 99 - 109 mmol/L 
  CO2 23 23 - 32 mmol/L 
  ANION GAP AGAP 20 5 - 20 mmol/L 
  GLUCOSE 138 (H) 65 - 99 mg/dL 
  BUN 34 (H) 8 - 25 mg/dL 
  CREATININE 6.6 (H) 0.50 - 1.00 mg/dL 
  BUN/CREAT 5  
  CALCIUM 8.7 8.5 - 10.5 mg/dL 
  TOTAL PROTEIN 6.1 (L) 6.3 - 8.2 g/dL 
  Albumin 2.2 (L) 3.3 - 4.8 g/dL 
  GLOBULIN 3.9 1.3 - 4.9 g/dL 
 
  A/G 0.6 (L) 1.0 - 2.4 
  TBIL 0.4 0.1 - 1.5 mg/dL 
  ALK PHOS 82 35 - 115 U/L 
  AST 16 10 - 45 U/L 
  ALT 14 10 - 65 U/L 
  EGFR 6 (L) >60 mL/min/1.73m2 
 
 Results  
  Procedure Component Value Units Date/Time 
  Anaerobic Culture W/Gram Stain [[id number]] Collected:  18 1320 
  Specimen:  Wound Updated:  18 
   Specimen Description WOUND 
   SPECIAL REQUESTS FIRST METATARSAL LEFT FOOT 
   CULTURE CULTURE IN PROGRESS 
  
 
 Diagnosis
 1. L forefoot chronic infection with osteomyelitis. Post L TMA 18. Wound culture and 
surgical path preliminary grew skin marzena. 
 2. Multiple co-morbidities : ESRD, on HD, PVD, CAD post CABG, DM.
 
 Recommendations
 1. Continue IV Cefepime 2 g post HD, 3 times per week
 2. Await final surgical culture
 3. Continue wound care 
 
  Michael Peguero MD
 2018
 
 Reyes, Diana M, RN - 2018  5:55 AM PSTChart check complete. Pt's breakfast ordered in
 anticipation of HD. Srinivasan Jordan DPM - 2018  5:07 PM PSTFormatting of this note 
may be different from the original.
 [facility]  
 Service:  Foot and Ankle Surgery (Podiatry)
 Progress Note
 
 Hospital Day:   LOS: 0 days 
 Post-Op Day:  1 Day Post-Op
 
 SUBJECTIVE
 
      Patient Summary:  DM female with ESRD on dialysis and PAD.  S/p revascularization by STEVEN Mclaughlin.  Patient had exposed bone when she was seen by me in the office, she was needing rem
oval of the infected bone, I recommended transmetatarsal amputation due to lesser toe deform
ities and the extent of osseous involvement.  Patient required admission postoperatively for
 physical therapy evaluation and treatment, dialysis, infectious disease evaluation due to i
ntraoperative purulence of the first metatarsal which was not completely resected and potent
ial case management for later transfer to skilled nursing facility
 
      Events Overnight:  S/p Trans-metatarsal amputation yesterday
 
 Patient denies any nausea, vomiting, fever, chills, shortness of breath, chest pain, or karen
f pain 
 
 Scheduled Medications 
   apixaban  2.5 mg Oral BID 
   aspirin  81 mg Oral Daily with breakfast 
   atorvastatin  20 mg Oral Nightly 
   calcium acetate  667 mg Oral TID WC 
   carvedilol  3.125 mg Oral BID WC 
 
   cefepime  2 g Intravenous Q48H 
   clopidogrel  75 mg Oral Daily 
   furosemide  20 mg Oral Daily 
   insulin glargine  20 Units Subcutaneous Nightly 
   insulin lispro (human)  0-10 Units Subcutaneous TID AC 
   insulin lispro (human)  0-5 Units Subcutaneous Nightly 
   isosorbide mononitrate  60 mg Oral Daily 
   losartan  100 mg Oral Daily 
   nortriptyline  25 mg Oral Daily 
   nortriptyline  75 mg Oral Nightly 
   oxybutynin  2.5 mg Oral BID 
   pantoprazole  40 mg Oral QAM AC 
   rOPINIRole  0.75 mg Oral Nightly 
   Vortioxetine HBr  10 mg Oral Nightly 
 
 Continuous Infusions 
   dextrose   
 
 PRN Medications
 acetaminophen **OR** acetaminophen, albuterol, dextrose, dextrose, dextrose, glucagon, gluc
agon, HYDROcodone-acetaminophen **OR** HYDROcodone-acetaminophen, loperamide, LORazepam, nit
roGLYCERIN, ondansetron **OR** ondansetron, polyethylene glycol, prochlorperazine, saline lo
ck IV - when tolerating PO fluids **AND** sodium chloride (PF)
 
 OBJECTIVE
 Vital Signs:
 /54  | Pulse 76  | Temp 98.9 F (37.2 C) (Oral)  | Resp 16  | Ht 1.778 m (5' 10") 
 | Wt 68.7 kg (151 lb 7.3 oz)  | SpO2 100%  | BMI 21.73 kg/m 
 Temp:  [97.9 F (36.6 C)-98.9 F (37.2 C)] 98.9 F (37.2 C) ( 1549)
 BP: (107-135)/(53-63) 107/54 ( 1642)
 Heart Rate:  [68-80] 76 ( 1642)
 Resp:  [16-20] 16 (1549)
 SpO2:  [96 %-100 %] 100 % (1549)
 Weight:  [68.7 kg (151 lb 7.3 oz)] 68.7 kg (151 lb 7.3 oz) (2030)
 
 Constitutional: The patient is alert and oriented to person, place and time
 Psychiatric: The patient has normal affect and mood; her speech is normal
 Respiratory: Effort normal and breath sounds normal. No accessory muscle usage
 Eyes: pupils equal, round, and react to light, no strabismus
 
 Focused Bilateral Lower Extremity Examination:
 Vascular: 
 Lower extremities appear perfuse, CFT brisk to expose skin
 Splint left intact as there is no strikethrough bleeding on dressings
 
 Neurological: 
 Diminished sensations to light touch to the toes of the right foot, normal sensation to lig
ht touch to the left knee 
 
 Dermatological/Lymph:
 Splint intact left lower extremity, no strikethrough bleeding
 
 Musculoskeletal/Orthopaedic: 
 Absent forefoot left
 Normal muscle strength other extremities
 
 Imaging: 
 Xr Chest 2 View
 
 Result Date: 2018
 
 1.  Mild diffuse pulmonary edema, similar to the prior exam. 2.  Left perihilar airspace op
acity which may represent edema. Recommend repeat chest x-ray in 6-8 weeks to verify resolut
ion. 3.  Small left pleural effusion and/or pleural scarring, with adjacent atelectasis or c
onsolidation, similar to the prior exam. Small right pleural effusion. 4.  Peripheral opacit
y within the left lower hemithorax, slightly increased which is questionable for a loculated
 pleural fluid, parenchymal opacity, or artifact. Electronically signed by Chau Garcia on 1
2018 5:33 PM
 
 X-ray Foot Left
 
 Result Date: 2018
 Amputation of the left forefoot at the level of the proximal metatarsals. Surgical cutaneou
s staples are present. No radiographic evidence for osteomyelitis. Normal alignment of the h
indfoot. Mild degeneration of the midfoot. Electronically signed by Oleksandr Lemus MD on 2018 10:12 AM
 
 Nm Myocardial Perfusion Spect (stress And Rest)
 
 Result Date: 2018
 1.  Mild to moderate left ventricular dilatation, unchanged between rest and stress. 2.  I 
cannot exclude mid anterior wall infarct. 3.  I see no evidence of ischemia. 4.  Global mode
rate hypokinesis. 5.  LVEF 29% stress, 33% rest. Using the risk stratification system at our
 institution, this examination equates to at least a high risk based on this imaging, bri cm from the criteria below (1). Note that this should be interfaced with clinical and other d
patrizia, potentially affecting final categorization. American Heart Association and American Col
lege of Cardiology Scientific Statement. Circulation (2008); 118: p 2645-4692 Electronically
 signed by Wilfrido Knight MD on 2018 4:34 PM
 
 Ir Angiogram Extremity Left
 
 Result Date: 2018
 1. Aorta with narrow aortic bifurcation with moderate stenosis of proximal left common errol
c artery. 2. Left SFA with moderate stenosis proximally. 3. Single vessel runoff to left jeanne
t via left anterior tibial artery. 4. Left posterior tibial artery and peroneal artery were 
occluded with reconstitution of distal posterior tibial artery at the left ankle via collate
rals. 5. If forefoot amputation does not heal, may need popliteal to posterior tibial artery
 bypass versus antegrade access of left common femoral artery with posterior tibial artery r
ecanalization. Electronically signed by Kirt Mclaughlin MD on 2018 10:51 AM
 
 Xr Chest 1 View
 
 Result Date: 2018
 1.  Minimal interstitial edema with tiny bilateral pleural effusions. 2.  Mild left basilar
 opacity to likely represent subsegmental atelectasis and/or airspace disease. Electronicall
y signed by Jason Torres DO on 2018 3:50 PM
 
 Us Lower Extremity Arterial Left
 
 Result Date: 2018
 1.  Greater than 50% stenosis of the mid superficial femoral artery. 2.  Monophasic single 
runoff to the ankle via the anterior tibial artery. Electronically signed by Jason Torres DO on 2018 1:52 PM
 
 Echo Cardiac Adult With Contrast
 
 Result Date: 2018
 1. Overall left ventricular systolic function is severely impaired with, an EF between 20 -
 25 %. 2. There is mild aortic regurgitation. 3. There is mild to  moderate aortic stenosis 
present. 4. Mild-to-moderate mitral regurgitation is present. 5. There is moderate pulmonary
 hypertension.
 
 
 PROBLEM LIST
 Principal Problem:
   Osteomyelitis of left foot (HCC)
 Active Problems:
   Depression
   ESRD (end stage renal disease) (HCC)
   Type 2 diabetes mellitus with chronic kidney disease on chronic dialysis, with long-term 
current use of insulin ([facility])
   Anemia in ESRD (end-stage renal disease) (HCC)
   Hypertension, essential
   Diabetic foot ulcer (HCC)
   Chronic systolic congestive heart failure (HCC)
   Chronic anticoagulation
   Paroxysmal atrial fibrillation (HCC)
   PVD (peripheral vascular disease) (HCC)
   CAD (coronary artery disease)
 
 ASSESSMENT & PLAN
 S/p: Transmetatarsal amputation of the left foot with co morbidities of diabetes, end-stage
 renal disease, osteomyelitis left first metatarsal
 Date of surgery: 2018
 Post op day: 1
 
 New data: Surgical pathology report for first metatarsal margin pending, intraoperative cul
tures pending
 Dressing changes: None, leave dressing clean dry and intact, please call me for any striket
hrough bleeding and reinforce dressing
 Weight bearing status: Strict nonweightbearing to the left foot or ankle
 Physical therapy to see
 Anticipate patient will need skilled nursing facility stay postoperatively to assure that s
he remains nonweightbearing to allow stump to heal
 Recommend 6 weeks of antibiotic therapy given intraoperative purulence intramedullary to fi
rst metatarsal which was incompletely resected
 F/u in 10-14 days in my office, appt already scheduled 
 
 Code Status:  Prior
 
 Srinivasan Jordan DPM
 2018
 Moiz Josue MD - 2018 12:29 PM PSTFormatting of this note may be different
 from the original.
 [facility]  
 Service:  Hospitalist
 Progress Note
 
 Hospital Day:   LOS: 0 days 
 Post-Op Day:  1 Day Post-Op
 SUBJECTIVE
 
      Patient Summary:    Ms. Villalobos is a 68-year-old female who has history of end-stage re
nal disease on hemodialysis 3 times a week, type 2 diabetes mellitus, hypertension, peripher
al arterial disease status post angioplasty of left lower extremity, coronary artery disease
 status post CABG, status post aortic valve replacement in 2018, heart failure with reduc
ed ejection fraction status post AICD placement, atrial fibrillation on apixaban developed i
nfection of left first metatarsal bone and underwent left transmetatarsal amputation yesterd
ay by Dr. Jordan.  During surgery, Dr. Jordan thought that patient has significant osteomy
elitis of foot, hence she was admitted under the care of hospitalist and is getting IV cefep
reyna with hemodialysis, recommendations of Dr. Asher.
 
 
      Events Overnight:  Patient C/O left foot pain and states she feels as her left toes ar
e hurting though she had TMA yesterday. Denies any other symptoms. She was dialyzed yesterda
y. 
 
 Scheduled Medications 
   apixaban  2.5 mg Oral BID 
   aspirin  81 mg Oral Daily with breakfast 
   atorvastatin  20 mg Oral Nightly 
   calcium acetate  667 mg Oral TID WC 
   carvedilol  3.125 mg Oral BID WC 
   cefepime  2 g Intravenous Q48H 
   clopidogrel  75 mg Oral Daily 
   furosemide  20 mg Oral Daily 
   insulin glargine  20 Units Subcutaneous Nightly 
   insulin lispro (human)  0-10 Units Subcutaneous TID AC 
   insulin lispro (human)  0-5 Units Subcutaneous Nightly 
   isosorbide mononitrate  60 mg Oral Daily 
   losartan  100 mg Oral Daily 
   nortriptyline  25 mg Oral Daily 
   nortriptyline  75 mg Oral Nightly 
   oxybutynin  2.5 mg Oral BID 
   pantoprazole  40 mg Oral QAM AC 
   rOPINIRole  0.75 mg Oral Nightly 
   Vortioxetine HBr  10 mg Oral Nightly 
 
 Continuous Infusions 
   dextrose   
 
 PRN Medications
 acetaminophen **OR** acetaminophen, albuterol, dextrose, dextrose, dextrose, glucagon, gluc
agon, HYDROcodone-acetaminophen **OR** HYDROcodone-acetaminophen, loperamide, LORazepam, nit
roGLYCERIN, ondansetron **OR** ondansetron, polyethylene glycol, prochlorperazine, saline lo
ck IV - when tolerating PO fluids **AND** sodium chloride (PF)
 
 OBJECTIVE
 Vital Signs:
 /53 (BP Location: Right upper arm)  | Pulse 71  | Temp 98.4 F (36.9 C) (Oral)  | 
Resp 18  | Ht 1.778 m (5' 10")  | Wt 68.7 kg (151 lb 7.3 oz)  | SpO2 100%  | BMI 21.73 kg/m
 
 Temp:  [97.2 F (36.2 C)-98.9 F (37.2 C)] 98.4 F (36.9 C) (1110)
 BP: ()/(44-70) 108/53 ( 111)
 Heart Rate:  [67-80] 71 (1110)
 Resp:  [9-22] 18 (1110)
 SpO2:  [94 %-100 %] 100 % (1110)
 Weight:  [68.7 kg (151 lb 7.3 oz)] 68.7 kg (151 lb 7.3 oz) (2030)
 I&O Last 3 Shifts:   1900 -  0659
 In: 2500 [I.V.:600]
 Out: 2700 
 
 Physical Exam 
 Constitutional: She is oriented to person, place, and time. She appears well-developed and 
well-nourished. No distress. 
 HENT: 
 Head: Normocephalic. 
 Mouth/Throat: Oropharynx is clear and moist. No oropharyngeal exudate. 
 Eyes: Pupils are equal, round, and reactive to light. Conjunctivae are normal. No scleral i
cterus. 
 Neck: Neck supple. No JVD present. 
 Cardiovascular: Normal rate, regular rhythm and normal heart sounds.  Exam reveals no antunez
p and no friction rub.  
 
 Pulmonary/Chest: Effort normal and breath sounds normal. No respiratory distress. She has n
o wheezes. She has no rales. She exhibits no tenderness. 
 Abdomina/Gl: Soft. Bowel sounds are normal. She exhibits no distension and no mass. There i
s no tenderness. There is no rebound and no guarding. No hernia. 
 Musculoskeletal: 
 Left foot covered with dressing. 
 No right pedal edema.  
 Neurological: She is alert and oriented to person, place, and time. No cranial nerve defici
t. 
 Skin: Skin is warm and dry. No rash noted. She is not diaphoretic. No erythema. There is pa
llor. 
 Psychiatric: She has a normal mood and affect. Her behavior is normal. Judgment and thought
 content normal. 
 
 DATA
 
 CBC:  
 Lab Results 
 Component Value Date 
  WBC 5.94 2018 
  RBC 2.73 (L) 2018 
  HGB 9.6 (L) 2018 
  HCT 28.3 (L) 2018 
  .5 (H) 2018 
  MCH 35.0 (H) 2018 
  MCHC 33.8 2018 
  RDW 63.4 (H) 2018 
   2018 
  MPV 8.0 2018 
  DIFFTYPE AUTOMATED 2018 
 
 CMP:  
 Lab Results 
 Component Value Date 
   (L) 2018 
  K 4.8 2018 
  CL 99 2018 
  CO2 26 2018 
  ANIONGAP 14 2018 
  GLUF 222 (H) 2018 
  BUN 22 2018 
  CREATININE 4.6 (H) 2018 
  BCR 5 2018 
  CA 8.5 2018 
  CA 8.7 2016 
  PROT 6.1 (L) 2018 
  ALB 2.4 (L) 2018 
  GLOB 3.7 2018 
  BILITOT 0.4 2018 
  ALP 95 2018 
  AST 23 2018 
  ALT 16 2018 
  EGFR 9 (L) 2018 
 
 X-ray Foot Left
 
 Result Date: 2018
 Amputation of the left forefoot at the level of the proximal metatarsals. Surgical cutaneou
s staples are present. No radiographic evidence for osteomyelitis. Normal alignment of the h
indfoot. Mild degeneration of the midfoot. Electronically signed by Oleksandr Lemus MD on 2018 10:12 AM
 
 PROBLEM LIST
 
 Principal Problem:
   Osteomyelitis of left foot (HCC)
 Active Problems:
   Depression
   ESRD (end stage renal disease) (HCC)
   Type 2 diabetes mellitus with chronic kidney disease on chronic dialysis, with long-term 
current use of insulin (HCC)
   Anemia in ESRD (end-stage renal disease) ([facility])
   Hypertension, essential
   Diabetic foot ulcer (HCC)
   Chronic systolic congestive heart failure (HCC)
   Chronic anticoagulation
   Paroxysmal atrial fibrillation (HCC)
   PVD (peripheral vascular disease) (HCC)
   CAD (coronary artery disease)
 
 Patient diagnosed with:  , and I agree with the following nutritional recommendations:
  
 
 ASSESSMENT & PLAN
 
 Osteomyelitis of left foot SP left TMA by Dr. Jordan yesterday. Await bone culture. Patien
t is receiving cefepime with each HD per nephrology recommendations. Dr. Jordan to follow p
atient today. Will lorrie ID consult. Patient follows Dr. Anrdés Elliott. 
 
 Phantom pain. May need gabapentin or Lyrica. 
 
 ESRD. On HD every T-T-S. Next HD tomorrow. Appreciate help from nephrology services. 
 
 Type II DM with complications. Most blood sugars are above 200. She is on Lantus and SSI. L
ast glycohemoglobin A1c was 7.5 on 18. Will add scheduled humalog and may increase the
 dose of Lantus by tomorrow. 
 
 CAD SP CABG, paroxysmal atrial fibrillation, HFrEF. All cardiac problems are under control.
 Will continue apixaban, carvedilol, aspirin, Plavix, losartan and Imdur. 
 
 History of PAD. SP left leg angioplasty. On Plavix and aspirin. 
 
 Anemia of chronic kidney disease. Management per nephrology services. 
 
 Discussed with Dr. Jordan on phone. Patient will remain in the hospital for few days for I
V antibiotics. She may also need SNF transfer upon discharge. Hence will change outpatient s
tatus to inpatient. 
 
 Disposition:  
 Code Status:  Prior
 
 Moiz Josue MD
 2018Pretty Onofre, PT - 2018  8:30 AM PSTJuno Bran PT:  DAILY FOREVER!   :
-)
 Every commercial- move Read- every 5pg-move:
  
 Lying in bed:  go slowly!
 1)*Ankle pumps; 9)*Push heels down;
 2)*Squeeze bottom/gluts/bladder;
 3)*Push back of knees into bed/straight knees;
 
 4) Straight leg up and down;   10) Scoot R/L;
 5) Straight leg-slide out to side/back;
 6) Heel slide- bring knee up/down to chest;
 7) Jelly fish hands/feet- toe ; 
 8) Straight arms-lift high-w/out pain, breath deep; 
 * "1000-a -day"
 Both knees bent: R-only1) push bottom up/slight bridge(one knee bent-like one leg roll); 2)
 Rock knees R/L; 3) Bridge; 4) Rock baby; 5) Alternate reach -straight arms; 6) Chin tuck
 
 Sittin) Tap toes; 10) ABC's; 
 2) Straighten knee and bend/kick ball;
 3) Lift knee/march;
 4) Step knee/foot out to side and back;
 5) Squeeze bottom; 
 6) Straight arms-lift high w/out pain- deep breath; 
 7) Sit to stand 5x; 8) Scoot R/L B/F; 
 9) Backward shoulder rolls;
                  NEVER GO THROUGH PAIN!!!
 
          Start slowly!!!      Be safe!!!
 Standing- at counter: R/not yets-once WBAT
 1) Toe/heel raises; not yet
 2) March- lift knee; L-only
 3) Straight leg back;L-only
 4) Straight leg out to side; L-only
 5) Bend knee-kick self; L-only
 6) Push ups; not yet
 7) Back to wall-bend knees-flatten back-not yet
 Walking(Forward/Backward): not yet
 1) Side step;                                  
 2) Toe walk-Heel walk;                 
 3) High knee march;                      
 4) Toe to heel walk;                       
 Balance(Eyes Open/Close-Foam): not yet
 1) Feet together;
 2) Toe to instep;
 3) Heel to Toe;
 4) Single foot stand;
 "*1000-a-day"
 Lying on back
 1) Ankle pumps; 2) Squeeze bottom; 3) Push knees into bed; 4) Straight leg up; 5) Straight 
leg out; 6) Heel slide;
 Both knees bent: R-only1) push bottom up/slight bridge(one knee bent-like one leg roll); 2)
 Rock knees R/L; 3) Bridge; 4) Rock baby; 5) Alternate reach -straight arms; 6) Chin Kyler Morse, [facility] - 2018  4:26 PM PSTRenal Dosing Monitoring:
 S: Renal dose monitoring per protocol. 
 O: 
  HD patient
 A:  No adjustments needed at this time. 
 P:  Pharmacy will continue monitoring patient for appropriate dosing based on renal functio
n.
 
 Pharmacist: Kyler Tyler, PharmD
 in this encounter
 
 Plan of Treatment
 
 
 
+--------+---------+-----------+----------------------+-------------+
| Date   | Type    | Specialty | Care Team            | Description |
+--------+---------+-----------+----------------------+-------------+
| 04/10/ | Office  | Podiatry  |   Srinivasan Jordan,  |             |
| 2019   | Visit   |           | DPM  [address], |             |
|        |         |           |  [address]  [place],  |             |
|        |         |           | [place] [address]             |             |
|        |         |           | [phone]         |             |
|        |         |           | [phone] (Fax)   |             |
+--------+---------+-----------+----------------------+-------------+
 as of this encounter
 
 Procedures
 
 
+----------------------+--------+-------------+----------------------+----------------------
+
| Procedure Name       | Priori | Date/Time   | Associated Diagnosis | Comments             
|
|                      | ty     |             |                      |                      
|
+----------------------+--------+-------------+----------------------+----------------------
+
| POCT GLUCOSE         | Routin | 2019  |                      |   Results for this   
|
|                      | e      | 11:23 AM    |                      | procedure are in the 
|
|                      |        | PST         |                      |  results section.    
|
+----------------------+--------+-------------+----------------------+----------------------
+
| POCT GLUCOSE         | Routin | 2019  |                      |   Results for this   
|
|                      | e      |  5:39 AM    |                      | procedure are in the 
|
|                      |        | PST         |                      |  results section.    
|
+----------------------+--------+-------------+----------------------+----------------------
+
| CBC W/AUTO DIFF      | Routin | 2019  |                      |   Results for this   
|
| (REFLEX TO MANUAL)   | e      |  4:58 AM    |                      | procedure are in the 
|
|                      |        | PST         |                      |  results section.    
|
+----------------------+--------+-------------+----------------------+----------------------
+
| COMPREHENSIVE        | Routin | 2019  |                      |   Results for this   
|
| METABOLIC PANEL      | e      |  4:58 AM    |                      | procedure are in the 
|
|                      |        | PST         |                      |  results section.    
|
+----------------------+--------+-------------+----------------------+----------------------
+
| POCT GLUCOSE         | Routin | 2019  |                      |   Results for this   
|
|                      | e      |  8:58 PM    |                      | procedure are in the 
|
|                      |        | PST         |                      |  results section.    
 
|
+----------------------+--------+-------------+----------------------+----------------------
+
| POCT GLUCOSE         | Routin | 2019  |                      |   Results for this   
|
|                      | e      |  4:09 PM    |                      | procedure are in the 
|
|                      |        | PST         |                      |  results section.    
|
+----------------------+--------+-------------+----------------------+----------------------
+
| POCT GLUCOSE         | Routin | 2019  |                      |   Results for this   
|
|                      | e      | 11:00 AM    |                      | procedure are in the 
|
|                      |        | PST         |                      |  results section.    
|
+----------------------+--------+-------------+----------------------+----------------------
+
| POCT GLUCOSE         | Routin | 2019  |                      |   Results for this   
|
|                      | e      |  5:38 AM    |                      | procedure are in the 
|
|                      |        | PST         |                      |  results section.    
|
+----------------------+--------+-------------+----------------------+----------------------
+
| CBC W/AUTO DIFF      | Routin | 2019  |                      |   Results for this   
|
| (REFLEX TO MANUAL)   | e      |  5:25 AM    |                      | procedure are in the 
|
|                      |        | PST         |                      |  results section.    
|
+----------------------+--------+-------------+----------------------+----------------------
+
| COMPREHENSIVE        | Routin | 2019  |                      |   Results for this   
|
| METABOLIC PANEL      | e      |  5:25 AM    |                      | procedure are in the 
|
|                      |        | PST         |                      |  results section.    
|
+----------------------+--------+-------------+----------------------+----------------------
+
| POCT GLUCOSE         | Routin | 2019  |                      |   Results for this   
|
|                      | e      |  9:38 PM    |                      | procedure are in the 
|
|                      |        | PST         |                      |  results section.    
|
+----------------------+--------+-------------+----------------------+----------------------
+
| POCT GLUCOSE         | Routin | 2019  |                      |   Results for this   
|
|                      | e      |  4:35 PM    |                      | procedure are in the 
|
|                      |        | PST         |                      |  results section.    
|
+----------------------+--------+-------------+----------------------+----------------------
+
| POCT GLUCOSE         | Routin | 2019  |                      |   Results for this   
 
|
|                      | e      | 11:37 AM    |                      | procedure are in the 
|
|                      |        | PST         |                      |  results section.    
|
+----------------------+--------+-------------+----------------------+----------------------
+
| EEG                  | Today  | 2019  |                      |   Results for this   
|
|                      |        |  7:55 AM    |                      | procedure are in the 
|
|                      |        | PST         |                      |  results section.    
|
+----------------------+--------+-------------+----------------------+----------------------
+
| CBC W/AUTO DIFF      | Routin | 2019  |                      |   Results for this   
|
| (REFLEX TO MANUAL)   | e      |  5:50 AM    |                      | procedure are in the 
|
|                      |        | PST         |                      |  results section.    
|
+----------------------+--------+-------------+----------------------+----------------------
+
| COMPREHENSIVE        | Routin | 2019  |                      |   Results for this   
|
| METABOLIC PANEL      | e      |  5:50 AM    |                      | procedure are in the 
|
|                      |        | PST         |                      |  results section.    
|
+----------------------+--------+-------------+----------------------+----------------------
+
| POCT GLUCOSE         | Routin | 2019  |                      |   Results for this   
|
|                      | e      |  5:45 AM    |                      | procedure are in the 
|
|                      |        | PST         |                      |  results section.    
|
+----------------------+--------+-------------+----------------------+----------------------
+
| TROPONIN I           | Timed  | 2019  |                      |   Results for this   
|
|                      |        | 12:45 AM    |                      | procedure are in the 
|
|                      |        | PST         |                      |  results section.    
|
+----------------------+--------+-------------+----------------------+----------------------
+
| TROPONIN I           | Timed  | 2019  |                      |   Results for this   
|
|                      |        |  9:10 PM    |                      | procedure are in the 
|
|                      |        | PST         |                      |  results section.    
|
+----------------------+--------+-------------+----------------------+----------------------
+
| POCT GLUCOSE         | Routin | 2019  |                      |   Results for this   
|
|                      | e      |  9:08 PM    |                      | procedure are in the 
|
|                      |        | PST         |                      |  results section.    
 
|
+----------------------+--------+-------------+----------------------+----------------------
+
| XR CHEST 1 VIEW      | STAT   | 2019  |                      |   Results for this   
|
|                      |        |  4:43 PM    |                      | procedure are in the 
|
|                      |        | PST         |                      |  results section.    
|
+----------------------+--------+-------------+----------------------+----------------------
+
| EKG STANDARD 12 LEAD | STAT   | 2019  |                      |   Results for this   
|
|                      |        |  4:30 PM    |                      | procedure are in the 
|
|                      |        | PST         |                      |  results section.    
|
+----------------------+--------+-------------+----------------------+----------------------
+
| CK MB                | Add-On | 2019  |                      |   Results for this   
|
|                      |        |  4:10 PM    |                      | procedure are in the 
|
|                      |        | PST         |                      |  results section.    
|
+----------------------+--------+-------------+----------------------+----------------------
+
| TROPONIN I           | STAT   | 2019  |                      |   Results for this   
|
|                      |        |  4:10 PM    |                      | procedure are in the 
|
|                      |        | PST         |                      |  results section.    
|
+----------------------+--------+-------------+----------------------+----------------------
+
| CK                   | Add-On | 2019  |                      |   Results for this   
|
|                      |        |  4:10 PM    |                      | procedure are in the 
|
|                      |        | PST         |                      |  results section.    
|
+----------------------+--------+-------------+----------------------+----------------------
+
| POCT GLUCOSE         | Routin | 2019  |                      |   Results for this   
|
|                      | e      |  3:42 PM    |                      | procedure are in the 
|
|                      |        | PST         |                      |  results section.    
|
+----------------------+--------+-------------+----------------------+----------------------
+
| POCT GLUCOSE         | Routin | 2019  |                      |   Results for this   
|
|                      | e      | 11:44 AM    |                      | procedure are in the 
|
|                      |        | PST         |                      |  results section.    
|
+----------------------+--------+-------------+----------------------+----------------------
+
| POCT GLUCOSE         | Routin | 2019  |                      |   Results for this   
 
|
|                      | e      |  5:55 AM    |                      | procedure are in the 
|
|                      |        | PST         |                      |  results section.    
|
+----------------------+--------+-------------+----------------------+----------------------
+
| SEDIMENTATION RATE,  | Routin | 2019  |                      |   Results for this   
|
| AUTOMATED            | e - AM |  5:32 AM    |                      | procedure are in the 
|
|                      |        | PST         |                      |  results section.    
|
+----------------------+--------+-------------+----------------------+----------------------
+
| CBC W/AUTO DIFF      | Routin | 2019  |                      |   Results for this   
|
| (REFLEX TO MANUAL)   | e      |  5:32 AM    |                      | procedure are in the 
|
|                      |        | PST         |                      |  results section.    
|
+----------------------+--------+-------------+----------------------+----------------------
+
| CBC W/MANUAL DIFF    | Routin | 2019  |                      |   Results for this   
|
|                      | e - AM |  5:32 AM    |                      | procedure are in the 
|
|                      |        | PST         |                      |  results section.    
|
+----------------------+--------+-------------+----------------------+----------------------
+
| C-REACTIVE PROTEIN   | Routin | 2019  |                      |   Results for this   
|
|                      | e - AM |  5:32 AM    |                      | procedure are in the 
|
|                      |        | PST         |                      |  results section.    
|
+----------------------+--------+-------------+----------------------+----------------------
+
| CK                   | Routin | 2019  |                      |   Results for this   
|
|                      | e - AM |  5:32 AM    |                      | procedure are in the 
|
|                      |        | PST         |                      |  results section.    
|
+----------------------+--------+-------------+----------------------+----------------------
+
| COMPREHENSIVE        | Routin | 2019  |                      |   Results for this   
|
| METABOLIC PANEL      | e      |  5:32 AM    |                      | procedure are in the 
|
|                      |        | PST         |                      |  results section.    
|
+----------------------+--------+-------------+----------------------+----------------------
+
| POCT GLUCOSE         | Routin | 2018  |                      |   Results for this   
|
|                      | e      |  9:04 PM    |                      | procedure are in the 
|
|                      |        | PST         |                      |  results section.    
 
|
+----------------------+--------+-------------+----------------------+----------------------
+
| CT HEAD WO CONTRAST  | Routin | 2018  |                      |   Results for this   
|
|                      | e      |  5:23 PM    |                      | procedure are in the 
|
|                      |        | PST         |                      |  results section.    
|
+----------------------+--------+-------------+----------------------+----------------------
+
| POCT GLUCOSE         | Routin | 2018  |                      |   Results for this   
|
|                      | e      |  4:34 PM    |                      | procedure are in the 
|
|                      |        | PST         |                      |  results section.    
|
+----------------------+--------+-------------+----------------------+----------------------
+
| POCT GLUCOSE         | Routin | 2018  |                      |   Results for this   
|
|                      | e      | 11:17 AM    |                      | procedure are in the 
|
|                      |        | PST         |                      |  results section.    
|
+----------------------+--------+-------------+----------------------+----------------------
+
| POCT GLUCOSE         | Routin | 2018  |                      |   Results for this   
|
|                      | e      |  6:26 AM    |                      | procedure are in the 
|
|                      |        | PST         |                      |  results section.    
|
+----------------------+--------+-------------+----------------------+----------------------
+
| VANCOMYCIN, RANDOM   | Routin | 2018  |                      |   Results for this   
|
|                      | e - AM |  6:17 AM    |                      | procedure are in the 
|
|                      |        | PST         |                      |  results section.    
|
+----------------------+--------+-------------+----------------------+----------------------
+
| POCT GLUCOSE         | Routin | 2018  |                      |   Results for this   
|
|                      | e      |  5:37 AM    |                      | procedure are in the 
|
|                      |        | PST         |                      |  results section.    
|
+----------------------+--------+-------------+----------------------+----------------------
+
| POCT GLUCOSE         | Routin | 2018  |                      |   Results for this   
|
|                      | e      |  9:10 PM    |                      | procedure are in the 
|
|                      |        | PST         |                      |  results section.    
|
+----------------------+--------+-------------+----------------------+----------------------
+
| POCT GLUCOSE         | Routin | 2018  |                      |   Results for this   
 
|
|                      | e      |  4:07 PM    |                      | procedure are in the 
|
|                      |        | PST         |                      |  results section.    
|
+----------------------+--------+-------------+----------------------+----------------------
+
| POCT GLUCOSE         | Routin | 2018  |                      |   Results for this   
|
|                      | e      | 10:57 AM    |                      | procedure are in the 
|
|                      |        | PST         |                      |  results section.    
|
+----------------------+--------+-------------+----------------------+----------------------
+
| CBC W/AUTO DIFF      | ASAP   | 2018  |                      |   Results for this   
|
| (REFLEX TO MANUAL)   |        |  5:47 AM    |                      | procedure are in the 
|
|                      |        | PST         |                      |  results section.    
|
+----------------------+--------+-------------+----------------------+----------------------
+
| COMPREHENSIVE        | ASAP   | 2018  |                      |   Results for this   
|
| METABOLIC PANEL      |        |  5:47 AM    |                      | procedure are in the 
|
|                      |        | PST         |                      |  results section.    
|
+----------------------+--------+-------------+----------------------+----------------------
+
| POCT GLUCOSE         | Routin | 2018  |                      |   Results for this   
|
|                      | e      |  5:15 AM    |                      | procedure are in the 
|
|                      |        | PST         |                      |  results section.    
|
+----------------------+--------+-------------+----------------------+----------------------
+
| POCT GLUCOSE         | Routin | 2018  |                      |   Results for this   
|
|                      | e      |  8:29 PM    |                      | procedure are in the 
|
|                      |        | PST         |                      |  results section.    
|
+----------------------+--------+-------------+----------------------+----------------------
+
| POCT GLUCOSE         | Routin | 2018  |                      |   Results for this   
|
|                      | e      |  4:44 PM    |                      | procedure are in the 
|
|                      |        | PST         |                      |  results section.    
|
+----------------------+--------+-------------+----------------------+----------------------
+
| POCT GLUCOSE         | Routin | 2018  |                      |   Results for this   
|
|                      | e      | 11:38 AM    |                      | procedure are in the 
|
|                      |        | PST         |                      |  results section.    
 
|
+----------------------+--------+-------------+----------------------+----------------------
+
| CBC W/AUTO DIFF      | Routin | 2018  |                      |   Results for this   
|
| (REFLEX TO MANUAL)   | e - AM |  5:50 AM    |                      | procedure are in the 
|
|                      |        | PST         |                      |  results section.    
|
+----------------------+--------+-------------+----------------------+----------------------
+
| COMPREHENSIVE        | Routin | 2018  |                      |   Results for this   
|
| METABOLIC PANEL      | e - AM |  5:50 AM    |                      | procedure are in the 
|
|                      |        | PST         |                      |  results section.    
|
+----------------------+--------+-------------+----------------------+----------------------
+
| POCT GLUCOSE         | Routin | 2018  |                      |   Results for this   
|
|                      | e      |  5:09 AM    |                      | procedure are in the 
|
|                      |        | PST         |                      |  results section.    
|
+----------------------+--------+-------------+----------------------+----------------------
+
| POCT GLUCOSE         | Routin | 2018  |                      |   Results for this   
|
|                      | e      |  9:07 PM    |                      | procedure are in the 
|
|                      |        | PST         |                      |  results section.    
|
+----------------------+--------+-------------+----------------------+----------------------
+
| POCT GLUCOSE         | Routin | 2018  |                      |   Results for this   
|
|                      | e      |  4:31 PM    |                      | procedure are in the 
|
|                      |        | PST         |                      |  results section.    
|
+----------------------+--------+-------------+----------------------+----------------------
+
| POCT GLUCOSE         | Routin | 2018  |                      |   Results for this   
|
|                      | e      | 11:14 AM    |                      | procedure are in the 
|
|                      |        | PST         |                      |  results section.    
|
+----------------------+--------+-------------+----------------------+----------------------
+
| XR FOOT LEFT         | Routin | 2018  |                      |   Results for this   
|
|                      | e      |  9:54 AM    |                      | procedure are in the 
|
|                      |        | PST         |                      |  results section.    
|
+----------------------+--------+-------------+----------------------+----------------------
+
| PATHOLOGY HISTOLOGY  | Routin | 2018  |                      |   Results for this   
 
|
| - TISSUE             | e      |  8:00 AM    |                      | procedure are in the 
|
|                      |        | PST         |                      |  results section.    
|
+----------------------+--------+-------------+----------------------+----------------------
+
| CBC W/AUTO DIFF      | ASAP   | 2018  |                      |   Results for this   
|
| (REFLEX TO MANUAL)   |        |  6:18 AM    |                      | procedure are in the 
|
|                      |        | PST         |                      |  results section.    
|
+----------------------+--------+-------------+----------------------+----------------------
+
| PHOSPHOROUS          | ASAP   | 2018  |                      |   Results for this   
|
|                      |        |  6:18 AM    |                      | procedure are in the 
|
|                      |        | PST         |                      |  results section.    
|
+----------------------+--------+-------------+----------------------+----------------------
+
| COMPREHENSIVE        | ASAP   | 2018  |                      |   Results for this   
|
| METABOLIC PANEL      |        |  6:18 AM    |                      | procedure are in the 
|
|                      |        | PST         |                      |  results section.    
|
+----------------------+--------+-------------+----------------------+----------------------
+
| POCT GLUCOSE         | Routin | 2018  |                      |   Results for this   
|
|                      | e      |  5:39 AM    |                      | procedure are in the 
|
|                      |        | PST         |                      |  results section.    
|
+----------------------+--------+-------------+----------------------+----------------------
+
| POCT GLUCOSE         | Routin | 2018  |                      |   Results for this   
|
|                      | e      |  9:01 PM    |                      | procedure are in the 
|
|                      |        | PST         |                      |  results section.    
|
+----------------------+--------+-------------+----------------------+----------------------
+
| POCT GLUCOSE         | Routin | 2018  |                      |   Results for this   
|
|                      | e      |  2:08 PM    |                      | procedure are in the 
|
|                      |        | PST         |                      |  results section.    
|
+----------------------+--------+-------------+----------------------+----------------------
+
| ANAEROBIC CULTURE    | STAT   | 2018  |                      |   Results for this   
|
| W/GRAM STAIN         |        |  1:20 PM    |                      | procedure are in the 
|
|                      |        | PST         |                      |  results section.    
 
|
+----------------------+--------+-------------+----------------------+----------------------
+
| FOREFOOT -           |        | 2018  |   Peripheral         |                      
|
| AMPUTATION           |        | 12:30 PM    | arterial disease,    |                      
|
|                      |        | PST         | osteomyelitis left   |                      
|
|                      |        |             | foot                 |                      
|
+----------------------+--------+-------------+----------------------+----------------------
+
 
 
 
+---+--------+
|   |        |
|   | Specia |
|   | l      |
|   | Needs  |
|   |  cysto |
|   |        |
|   | tubing |
|   |        |
|   | irriga |
|   | tion,  |
|   | reques |
|   | t 90   |
|   | minute |
|   | s, PCN |
|   |        |
|   | allerg |
|   | y.     |
|   | Pacema |
|   | ker/AI |
|   | CD,    |
|   | [place] |
|   |        |
|   | Scient |
|   | ific.  |
+---+--------+
 
 
 
+--------------+--------+-------------+---+----------------------+
| POCT GLUCOSE | Routin | 2018  |   |   Results for this   |
|              | e      | 10:27 AM    |   | procedure are in the |
|              |        | PST         |   |  results section.    |
+--------------+--------+-------------+---+----------------------+
 in this encounter
 
 Results
 POCT glucose (2019 11:23 AM)
 
+--------------------+--------------------------+---------------+-----------------+
| Component          | Value                    | Ref Range     | Performed At    |
+--------------------+--------------------------+---------------+-----------------+
| GLUCOSE,POC SCREEN | 194 (H)Comment: Testing  | 65 - 99 mg/dL | [facility] |
|                    | performed at [facility];[address]     |               |                 |
 
|                    | [address];[place]   |               |                 |
|                    | [id number]                    |               |                 |
+--------------------+--------------------------+---------------+-----------------+
 
 
 
+-------------------+------------------+--------------------+--------------+
| Performing        | Address          | City/State/Zipcode | Phone Number |
| Organization      |                  |                    |              |
+-------------------+------------------+--------------------+--------------+
|   [facility] |   [address] | [place] [address] |              |
+-------------------+------------------+--------------------+--------------+
 POCT glucose (2019  5:39 AM)
 
+--------------------+--------------------------+---------------+-----------------+
| Component          | Value                    | Ref Range     | Performed At    |
+--------------------+--------------------------+---------------+-----------------+
| GLUCOSE,POC SCREEN | 110 (H)Comment: Testing  | 65 - 99 mg/dL | [facility] |
|                    | performed at [facility];[address]     |               |                 |
|                    | [address];[place]   |               |                 |
|                    | [id number]                    |               |                 |
+--------------------+--------------------------+---------------+-----------------+
 
 
 
+-------------------+------------------+--------------------+--------------+
| Performing        | Address          | City/State/Zipcode | Phone Number |
| Organization      |                  |                    |              |
+-------------------+------------------+--------------------+--------------+
|   [facility] |   [address] | [place] [address] |              |
+-------------------+------------------+--------------------+--------------+
 Comprehensive metabolic panel (2019  4:58 AM)
 
+----------------+--------------------------+-------------------+--------------+
| Component      | Value                    | Ref Range         | Performed At |
+----------------+--------------------------+-------------------+--------------+
| SODIUM         | 137                      | 135 - 145 mmol/L  | [facility]   |
|                |                          |                   | LABORATORY   |
+----------------+--------------------------+-------------------+--------------+
| POTASSIUM      | 4.5                      | 3.5 - 4.9 mmol/L  | [facility]   |
|                |                          |                   | LABORATORY   |
+----------------+--------------------------+-------------------+--------------+
| CHLORIDE       | 99                       | 99 - 109 mmol/L   | [facility]   |
|                |                          |                   | LABORATORY   |
+----------------+--------------------------+-------------------+--------------+
| CO2            | 31                       | 23 - 32 mmol/L    | [facility]   |
|                |                          |                   | LABORATORY   |
+----------------+--------------------------+-------------------+--------------+
| ANION GAP AGAP | 12                       | 5 - 20 mmol/L     | [facility]   |
|                |                          |                   | LABORATORY   |
+----------------+--------------------------+-------------------+--------------+
| GLUCOSE        | 103 (H)                  | 65 - 99 mg/dL     | [facility]   |
|                |                          |                   | LABORATORY   |
+----------------+--------------------------+-------------------+--------------+
| BUN            | 13                       | 8 - 25 mg/dL      | [facility]   |
|                |                          |                   | LABORATORY   |
+----------------+--------------------------+-------------------+--------------+
| CREATININE     | 3.5 (H)                  | 0.50 - 1.00 mg/dL | [facility]   |
|                |                          |                   | LABORATORY   |
+----------------+--------------------------+-------------------+--------------+
 
| BUN/CREAT      | 4                        |                   | [facility]   |
|                |                          |                   | LABORATORY   |
+----------------+--------------------------+-------------------+--------------+
| CALCIUM        | 9.0                      | 8.5 - 10.5 mg/dL  | [facility]   |
|                |                          |                   | LABORATORY   |
+----------------+--------------------------+-------------------+--------------+
| TOTAL PROTEIN  | 6.3                      | 6.3 - 8.2 g/dL    | [facility]   |
|                |                          |                   | LABORATORY   |
+----------------+--------------------------+-------------------+--------------+
| Albumin        | 2.6 (L)                  | 3.3 - 4.8 g/dL    | [facility]   |
|                |                          |                   | LABORATORY   |
+----------------+--------------------------+-------------------+--------------+
| GLOBULIN       | 3.7                      | 1.3 - 4.9 g/dL    | [facility]   |
|                |                          |                   | LABORATORY   |
+----------------+--------------------------+-------------------+--------------+
| A/G            | 0.7 (L)                  | 1.0 - 2.4         | [facility]   |
|                |                          |                   | LABORATORY   |
+----------------+--------------------------+-------------------+--------------+
| TBIL           | 0.4                      | 0.1 - 1.5 mg/dL   | [facility]   |
|                |                          |                   | LABORATORY   |
+----------------+--------------------------+-------------------+--------------+
| ALK PHOS       | 85                       | 35 - 115 U/L      | [facility]   |
|                |                          |                   | LABORATORY   |
+----------------+--------------------------+-------------------+--------------+
| AST            | 23                       | 10 - 45 U/L       | [facility]   |
|                |                          |                   | LABORATORY   |
+----------------+--------------------------+-------------------+--------------+
| ALT            | 17                       | 10 - 65 U/L       | [facility]   |
|                |                          |                   | LABORATORY   |
+----------------+--------------------------+-------------------+--------------+
| EGFR           | 13 (L)Comment: GFR <60:  | >60 mL/min/1.73m2 | [facility]   |
|                | CHRONIC KIDNEY DISEASE,  |                   | LABORATORY   |
|                | IF FOUND OVER A 3 MONTH  |                   |              |
|                | PERIOD.GFR <15: KIDNEY   |                   |              |
|                | FAILURE.FOR       |                   |              |
|                | [employer], MULTIPLY THE  |                   |              |
|                | CALCULATED GFR BY        |                   |              |
|                | 1.210.This eGFR is       |                   |              |
|                | calculated using the     |                   |              |
|                | MDRD IDMS traceable      |                   |              |
|                | equation.Testing         |                   |              |
|                | performed at [facility], [address] |                   |              |
|                |  [facility],        |                   |              |
|                | [place]    [address]   |                   |              |
+----------------+--------------------------+-------------------+--------------+
 
 
 
+----------+
| Specimen |
+----------+
| Blood    |
+----------+
 
 
 
+---------------+-------------------------+---------------------+----------------+
| Performing    | Address                 | City/State/Zipcode  | Phone Number   |
| Organization  |                         |                     |                |
+---------------+-------------------------+---------------------+----------------+
 
|   [facility]  |   [address]  | [place] [address] |   [phone] |
| LABORATORY    | [address]                   |                     |                |
+---------------+-------------------------+---------------------+----------------+
 CBC W/Auto Diff (Reflex to Manual) (2019  4:58 AM)
 
+-----------------+--------------------------+-------------------+--------------+
| Component       | Value                    | Ref Range         | Performed At |
+-----------------+--------------------------+-------------------+--------------+
| WBC             | 5.21                     | 3.80 - 11.00 K/uL | [facility]   |
|                 |                          |                   | LABORATORY   |
+-----------------+--------------------------+-------------------+--------------+
| RBC             | 2.54 (L)                 | 3.70 - 5.10 M/uL  | [facility]   |
|                 |                          |                   | LABORATORY   |
+-----------------+--------------------------+-------------------+--------------+
| HGB             | 8.7 (L)                  | 11.3 - 15.5 g/dL  | [facility]   |
|                 |                          |                   | LABORATORY   |
+-----------------+--------------------------+-------------------+--------------+
| HCT             | 26.3 (L)                 | 34.0 - 46.0 %     | [facility]   |
|                 |                          |                   | LABORATORY   |
+-----------------+--------------------------+-------------------+--------------+
| MCV             | 103.9 (H)                | 80.0 - 100.0 fl   | [facility]   |
|                 |                          |                   | LABORATORY   |
+-----------------+--------------------------+-------------------+--------------+
| MCH             | 34.4 (H)                 | 27.0 - 34.0 pg    | [facility]   |
|                 |                          |                   | LABORATORY   |
+-----------------+--------------------------+-------------------+--------------+
| MCHC            | 33.1                     | 32.0 - 35.5 g/dL  | [facility]   |
|                 |                          |                   | LABORATORY   |
+-----------------+--------------------------+-------------------+--------------+
| RDW SD          | 61.7 (H)                 | 37 - 53 fl        | [facility]   |
|                 |                          |                   | LABORATORY   |
+-----------------+--------------------------+-------------------+--------------+
| PLT             | 154                      | 150 - 400 K/uL    | [facility]   |
|                 |                          |                   | LABORATORY   |
+-----------------+--------------------------+-------------------+--------------+
| MPV             | 9.3                      | fl                | [facility]   |
|                 |                          |                   | LABORATORY   |
+-----------------+--------------------------+-------------------+--------------+
| DIFF TYPE       | AUTOMATED                |                   | [facility]   |
|                 |                          |                   | LABORATORY   |
+-----------------+--------------------------+-------------------+--------------+
| NEUTROPHILS     | 69.68                    | %                 | [facility]   |
|                 |                          |                   | LABORATORY   |
+-----------------+--------------------------+-------------------+--------------+
| LYMPHOCYTES     | 19.86                    | %                 | [facility]   |
|                 |                          |                   | LABORATORY   |
+-----------------+--------------------------+-------------------+--------------+
| MONOCYTES       | 9.79                     | %                 | [facility]   |
|                 |                          |                   | LABORATORY   |
+-----------------+--------------------------+-------------------+--------------+
| EOSINOPHILS     | 0.00                     | %                 | [facility]   |
|                 |                          |                   | LABORATORY   |
+-----------------+--------------------------+-------------------+--------------+
| BASOPHILS       | 0.67                     | %                 | [facility]   |
|                 |                          |                   | LABORATORY   |
+-----------------+--------------------------+-------------------+--------------+
| NEUTROPHILS ABS | 3.63                     | 1.90 - 7.40 K/uL  | [facility]   |
|                 |                          |                   | LABORATORY   |
+-----------------+--------------------------+-------------------+--------------+
| LYMPHOCYTES ABS | 1.03                     | 1.00 - 3.90 K/uL  | [facility]   |
 
|                 |                          |                   | LABORATORY   |
+-----------------+--------------------------+-------------------+--------------+
| MONOCYTES ABS   | 0.51                     | 0.00 - 0.80 K/uL  | [facility]   |
|                 |                          |                   | LABORATORY   |
+-----------------+--------------------------+-------------------+--------------+
| EOSINOPHILS ABS | 0.00                     | 0.00 - 0.50 K/uL  | [facility]   |
|                 |                          |                   | LABORATORY   |
+-----------------+--------------------------+-------------------+--------------+
| BASOPHILS ABS   | 0.04Comment: Testing     | 0.00 - 0.10 K/uL  | [facility]   |
|                 | performed at [facility], [address] |                   | LABORATORY   |
|                 |  [address],        |                   |              |
|                 | [place]  [id number]     |                   |              |
+-----------------+--------------------------+-------------------+--------------+
 
 
 
+----------+
| Specimen |
+----------+
| Blood    |
+----------+
 
 
 
+---------------+-------------------------+---------------------+----------------+
| Performing    | Address                 | City/State/Zipcode  | Phone Number   |
| Organization  |                         |                     |                |
+---------------+-------------------------+---------------------+----------------+
|   [facility]  |   [address]  | [place] [address] |   [phone] |
| LABORATORY    | [address]                   |                     |                |
+---------------+-------------------------+---------------------+----------------+
 POCT glucose (2019  8:58 PM)
 
+--------------------+--------------------------+---------------+-----------------+
| Component          | Value                    | Ref Range     | Performed At    |
+--------------------+--------------------------+---------------+-----------------+
| GLUCOSE,POC SCREEN | 122 (H)Comment: Testing  | 65 - 99 mg/dL | [facility] |
|                    | performed at [facility];[address]     |               |                 |
|                    | [address];[place]   |               |                 |
|                    | [address]                    |               |                 |
+--------------------+--------------------------+---------------+-----------------+
 
 
 
+-------------------+------------------+--------------------+--------------+
| Performing        | Address          | City/State/Zipcode | Phone Number |
| Organization      |                  |                    |              |
+-------------------+------------------+--------------------+--------------+
|   [facility] |   [address] | [place] [address] |              |
+-------------------+------------------+--------------------+--------------+
 POCT glucose (2019  4:09 PM)
 
+--------------------+--------------------------+---------------+-----------------+
| Component          | Value                    | Ref Range     | Performed At    |
+--------------------+--------------------------+---------------+-----------------+
| GLUCOSE,POC SCREEN | 192 (H)Comment: Testing  | 65 - 99 mg/dL | [facility] |
|                    | performed at [facility];[address]     |               |                 |
|                    | [address];[place]   |               |                 |
|                    | [address]                    |               |                 |
+--------------------+--------------------------+---------------+-----------------+
 
 
 
 
+-------------------+------------------+--------------------+--------------+
| Performing        | Address          | City/State/Zipcode | Phone Number |
| Organization      |                  |                    |              |
+-------------------+------------------+--------------------+--------------+
|   [facility] |   [address] | [place] [address] |              |
+-------------------+------------------+--------------------+--------------+
 POCT glucose (2019 11:00 AM)
 
+--------------------+-------------------------+---------------+-----------------+
| Component          | Value                   | Ref Range     | Performed At    |
+--------------------+-------------------------+---------------+-----------------+
| GLUCOSE,POC SCREEN | 84Comment: Testing      | 65 - 99 mg/dL | [facility] |
|                    | performed at [facility];[address]    |               |                 |
|                    | [address];[place], [place]  |               |                 |
|                    | [address]                   |               |                 |
+--------------------+-------------------------+---------------+-----------------+
 
 
 
+-------------------+------------------+--------------------+--------------+
| Performing        | Address          | City/State/Zipcode | Phone Number |
| Organization      |                  |                    |              |
+-------------------+------------------+--------------------+--------------+
|   [facility] |   [address] | [place] [address] |              |
+-------------------+------------------+--------------------+--------------+
 POCT glucose (2019  5:38 AM)
 
+--------------------+-------------------------+---------------+-----------------+
| Component          | Value                   | Ref Range     | Performed At    |
+--------------------+-------------------------+---------------+-----------------+
| GLUCOSE,POC SCREEN | 73Comment: Testing      | 65 - 99 mg/dL | [facility] |
|                    | performed at [facility];[address]    |               |                 |
|                    | [address];[place]  |               |                 |
|                    | [id number]                   |               |                 |
+--------------------+-------------------------+---------------+-----------------+
 
 
 
+-------------------+------------------+--------------------+--------------+
| Performing        | Address          | City/State/Zipcode | Phone Number |
| Organization      |                  |                    |              |
+-------------------+------------------+--------------------+--------------+
|   [facility] |   [address] | [place] [address] |              |
+-------------------+------------------+--------------------+--------------+
 Comprehensive metabolic panel (2019  5:25 AM)
 
+----------------+--------------------------+-------------------+--------------+
| Component      | Value                    | Ref Range         | Performed At |
+----------------+--------------------------+-------------------+--------------+
| SODIUM         | 138                      | 135 - 145 mmol/L  | [facility]   |
|                |                          |                   | LABORATORY   |
+----------------+--------------------------+-------------------+--------------+
| POTASSIUM      | 4.5                      | 3.5 - 4.9 mmol/L  | [facility]   |
|                |                          |                   | LABORATORY   |
+----------------+--------------------------+-------------------+--------------+
| CHLORIDE       | 100                      | 99 - 109 mmol/L   | [facility]   |
|                |                          |                   | LABORATORY   |
 
+----------------+--------------------------+-------------------+--------------+
| CO2            | 29                       | 23 - 32 mmol/L    | [facility]   |
|                |                          |                   | LABORATORY   |
+----------------+--------------------------+-------------------+--------------+
| ANION GAP AGAP | 14                       | 5 - 20 mmol/L     | [facility]   |
|                |                          |                   | LABORATORY   |
+----------------+--------------------------+-------------------+--------------+
| GLUCOSE        | 86                       | 65 - 99 mg/dL     | [facility]   |
|                |                          |                   | LABORATORY   |
+----------------+--------------------------+-------------------+--------------+
| BUN            | 21                       | 8 - 25 mg/dL      | [facility]   |
|                |                          |                   | LABORATORY   |
+----------------+--------------------------+-------------------+--------------+
| CREATININE     | 4.5 (H)                  | 0.50 - 1.00 mg/dL | [facility]   |
|                |                          |                   | LABORATORY   |
+----------------+--------------------------+-------------------+--------------+
| BUN/CREAT      | 5                        |                   | [facility]   |
|                |                          |                   | LABORATORY   |
+----------------+--------------------------+-------------------+--------------+
| CALCIUM        | 9.2                      | 8.5 - 10.5 mg/dL  | [facility]   |
|                |                          |                   | LABORATORY   |
+----------------+--------------------------+-------------------+--------------+
| TOTAL PROTEIN  | 5.9 (L)                  | 6.3 - 8.2 g/dL    | [facility]   |
|                |                          |                   | LABORATORY   |
+----------------+--------------------------+-------------------+--------------+
| Albumin        | 2.4 (L)                  | 3.3 - 4.8 g/dL    | [facility]   |
|                |                          |                   | LABORATORY   |
+----------------+--------------------------+-------------------+--------------+
| GLOBULIN       | 3.5                      | 1.3 - 4.9 g/dL    | [facility]   |
|                |                          |                   | LABORATORY   |
+----------------+--------------------------+-------------------+--------------+
| A/G            | 0.7 (L)                  | 1.0 - 2.4         | [facility]   |
|                |                          |                   | LABORATORY   |
+----------------+--------------------------+-------------------+--------------+
| TBIL           | 0.3                      | 0.1 - 1.5 mg/dL   | [facility]   |
|                |                          |                   | LABORATORY   |
+----------------+--------------------------+-------------------+--------------+
| ALK PHOS       | 78                       | 35 - 115 U/L      | [facility]   |
|                |                          |                   | LABORATORY   |
+----------------+--------------------------+-------------------+--------------+
| AST            | 21                       | 10 - 45 U/L       | [facility]   |
|                |                          |                   | LABORATORY   |
+----------------+--------------------------+-------------------+--------------+
| ALT            | 16                       | 10 - 65 U/L       | [facility]   |
|                |                          |                   | LABORATORY   |
+----------------+--------------------------+-------------------+--------------+
| EGFR           | 10 (L)Comment: GFR <60:  | >60 mL/min/1.73m2 | [facility]   |
|                | CHRONIC KIDNEY DISEASE,  |                   | LABORATORY   |
|                | IF FOUND OVER A 3 MONTH  |                   |              |
|                | PERIOD.GFR <15: KIDNEY   |                   |              |
|                | FAILURE.FOR       |                   |              |
|                | [employer], MULTIPLY THE  |                   |              |
|                | CALCULATED GFR BY        |                   |              |
|                | 1.210.This eGFR is       |                   |              |
|                | calculated using the     |                   |              |
|                | MDRD IDMS traceable      |                   |              |
|                | equation.Testing         |                   |              |
|                | performed at [facility], [address] |                   |              |
|                |  [facility],        |                   |              |
|                | [place]    [address]   |                   |              |
 
+----------------+--------------------------+-------------------+--------------+
 
 
 
+----------+
| Specimen |
+----------+
| Blood    |
+----------+
 
 
 
+---------------+-------------------------+---------------------+----------------+
| Performing    | Address                 | City/State/Zipcode  | Phone Number   |
| Organization  |                         |                     |                |
+---------------+-------------------------+---------------------+----------------+
|   [facility]  |   [address]  | [place] [address] |   [phone] |
| LABORATORY    | [address]                   |                     |                |
+---------------+-------------------------+---------------------+----------------+
 CBC W/Auto Diff (Reflex to Manual) (2019  5:25 AM)
 
+-----------------+--------------------------+-------------------+--------------+
| Component       | Value                    | Ref Range         | Performed At |
+-----------------+--------------------------+-------------------+--------------+
| WBC             | 5.22                     | 3.80 - 11.00 K/uL | [facility]   |
|                 |                          |                   | LABORATORY   |
+-----------------+--------------------------+-------------------+--------------+
| RBC             | 2.48 (L)                 | 3.70 - 5.10 M/uL  | [facility]   |
|                 |                          |                   | LABORATORY   |
+-----------------+--------------------------+-------------------+--------------+
| HGB             | 8.4 (L)                  | 11.3 - 15.5 g/dL  | [facility]   |
|                 |                          |                   | LABORATORY   |
+-----------------+--------------------------+-------------------+--------------+
| HCT             | 25.7 (L)                 | 34.0 - 46.0 %     | [facility]   |
|                 |                          |                   | LABORATORY   |
+-----------------+--------------------------+-------------------+--------------+
| MCV             | 103.9 (H)                | 80.0 - 100.0 fl   | [facility]   |
|                 |                          |                   | LABORATORY   |
+-----------------+--------------------------+-------------------+--------------+
| MCH             | 33.8                     | 27.0 - 34.0 pg    | [facility]   |
|                 |                          |                   | LABORATORY   |
+-----------------+--------------------------+-------------------+--------------+
| MCHC            | 32.5                     | 32.0 - 35.5 g/dL  | [facility]   |
|                 |                          |                   | LABORATORY   |
+-----------------+--------------------------+-------------------+--------------+
| RDW SD          | 59.5 (H)                 | 37 - 53 fl        | [facility]   |
|                 |                          |                   | LABORATORY   |
+-----------------+--------------------------+-------------------+--------------+
| PLT             | 149 (L)                  | 150 - 400 K/uL    | [facility]   |
|                 |                          |                   | LABORATORY   |
+-----------------+--------------------------+-------------------+--------------+
| MPV             | 9.0                      | fl                | [facility]   |
|                 |                          |                   | LABORATORY   |
+-----------------+--------------------------+-------------------+--------------+
| DIFF TYPE       | AUTOMATED                |                   | [facility]   |
|                 |                          |                   | LABORATORY   |
+-----------------+--------------------------+-------------------+--------------+
| NEUTROPHILS     | 72.47                    | %                 | [facility]   |
|                 |                          |                   | LABORATORY   |
+-----------------+--------------------------+-------------------+--------------+
 
| LYMPHOCYTES     | 18.03                    | %                 | [facility]   |
|                 |                          |                   | LABORATORY   |
+-----------------+--------------------------+-------------------+--------------+
| MONOCYTES       | 8.92                     | %                 | [facility]   |
|                 |                          |                   | LABORATORY   |
+-----------------+--------------------------+-------------------+--------------+
| EOSINOPHILS     | 0.00                     | %                 | [facility]   |
|                 |                          |                   | LABORATORY   |
+-----------------+--------------------------+-------------------+--------------+
| BASOPHILS       | 0.58                     | %                 | [facility]   |
|                 |                          |                   | LABORATORY   |
+-----------------+--------------------------+-------------------+--------------+
| NEUTROPHILS ABS | 3.78                     | 1.90 - 7.40 K/uL  | [facility]   |
|                 |                          |                   | LABORATORY   |
+-----------------+--------------------------+-------------------+--------------+
| LYMPHOCYTES ABS | 0.94 (L)                 | 1.00 - 3.90 K/uL  | [facility]   |
|                 |                          |                   | LABORATORY   |
+-----------------+--------------------------+-------------------+--------------+
| MONOCYTES ABS   | 0.47                     | 0.00 - 0.80 K/uL  | [facility]   |
|                 |                          |                   | LABORATORY   |
+-----------------+--------------------------+-------------------+--------------+
| EOSINOPHILS ABS | 0.00                     | 0.00 - 0.50 K/uL  | [facility]   |
|                 |                          |                   | LABORATORY   |
+-----------------+--------------------------+-------------------+--------------+
| BASOPHILS ABS   | 0.03Comment: Testing     | 0.00 - 0.10 K/uL  | [facility]   |
|                 | performed at [facility], [address] |                   | LABORATORY   |
|                 |  [address],        |                   |              |
|                 | [place]  [address]     |                   |              |
+-----------------+--------------------------+-------------------+--------------+
 
 
 
+----------+
| Specimen |
+----------+
| Blood    |
+----------+
 
 
 
+---------------+-------------------------+---------------------+----------------+
| Performing    | Address                 | City/State/Zipcode  | Phone Number   |
| Organization  |                         |                     |                |
+---------------+-------------------------+---------------------+----------------+
|   [facility]  |   [address]  | [place] [address] |   [phone] |
| LABORATORY    | [address]                   |                     |                |
+---------------+-------------------------+---------------------+----------------+
 POCT glucose (2019  9:38 PM)
 
+--------------------+--------------------------+---------------+-----------------+
| Component          | Value                    | Ref Range     | Performed At    |
+--------------------+--------------------------+---------------+-----------------+
| GLUCOSE,POC SCREEN | 206 (H)Comment: Testing  | 65 - 99 mg/dL | [facility] |
|                    | performed at [facility];[address]     |               |                 |
|                    | [address];[place]   |               |                 |
|                    | [address]                    |               |                 |
+--------------------+--------------------------+---------------+-----------------+
 
 
 
 
+-------------------+------------------+--------------------+--------------+
| Performing        | Address          | City/State/Zipcode | Phone Number |
| Organization      |                  |                    |              |
+-------------------+------------------+--------------------+--------------+
|   [facility] |   [address] | [place] [address] |              |
+-------------------+------------------+--------------------+--------------+
 POCT glucose (2019  4:35 PM)
 
+--------------------+--------------------------+---------------+-----------------+
| Component          | Value                    | Ref Range     | Performed At    |
+--------------------+--------------------------+---------------+-----------------+
| GLUCOSE,POC SCREEN | 168 (H)Comment: Testing  | 65 - 99 mg/dL | [facility] |
|                    | performed at [facility];[address]     |               |                 |
|                    | [address];[place]   |               |                 |
|                    | [address]                    |               |                 |
+--------------------+--------------------------+---------------+-----------------+
 
 
 
+-------------------+------------------+--------------------+--------------+
| Performing        | Address          | City/State/Zipcode | Phone Number |
| Organization      |                  |                    |              |
+-------------------+------------------+--------------------+--------------+
|   [facility] |   [address] | [place] [address] |              |
+-------------------+------------------+--------------------+--------------+
 POCT glucose (2019 11:37 AM)
 
+--------------------+--------------------------+---------------+-----------------+
| Component          | Value                    | Ref Range     | Performed At    |
+--------------------+--------------------------+---------------+-----------------+
| GLUCOSE,POC SCREEN | 164 (H)Comment: Testing  | 65 - 99 mg/dL | [facility] |
|                    | performed at [facility];[address]     |               |                 |
|                    | [address];[place]   |               |                 |
|                    | [address]                    |               |                 |
+--------------------+--------------------------+---------------+-----------------+
 
 
 
+-------------------+------------------+--------------------+--------------+
| Performing        | Address          | City/State/Zipcode | Phone Number |
| Organization      |                  |                    |              |
+-------------------+------------------+--------------------+--------------+
|   [facility] |   [address] | [place] [address] |              |
+-------------------+------------------+--------------------+--------------+
 EEG (2019  7:55 AM)
 
+------------------------------------------------------------------------+--------------+
| Narrative                                                              | Performed At |
+------------------------------------------------------------------------+--------------+
|   CHAYA DaoEEG/EP T.         2019    7:55 AM     [facility]  |              |
| [facility]  Neurodiagnostic Dept  [address]          |              |
| [place] [address]     Patient: Cherie Villalobos ID: [id number]       |              |
| : 1950 Age: 68   Gender: female Room: [facility]      Physician:       |              |
| Ambika Leyva Technician: Nida Vernon   Ref. Physician: Andrés     |              |
| Earl RODRIGES          Recording Date: 2019 Duration: 00:20:37           |              |
| Medications:    No medications.  History:    episodes of twitching     |              |
| mainly upper extremities, looks   like myoclonic jerks  General        |              |
| Description:    This was a 19 channel awake EEG recording   with       |              |
| International 10/20 electrode placements. The background   activity    |              |
| consisted of diffuse low to moderate amplitude Theta   (6-7Hz) and     |              |
 
| mild Delta intermixed throughout the recording.     Activation         |              |
| Procedures:    No change of the background activity with   eye opening |              |
|  was noted. Photic stimulation produced no significant   changes to    |              |
| the background activity.    EMG artifact was noted   intermittently    |              |
| with patient talking, eye opening and body   movements.   No           |              |
| epileptiform activity was noted with body jerking.      Sleep          |              |
| Patterns:    No drowsiness or sleep was noted.     Impression:    This |              |
|  wake EEG is abnormal.    Diffuse slowing is   suggestive of a diffuse |              |
|  encephalopathy of metabolic, degenerative   or vascular origin. No    |              |
| epileptiform activity was noted with body   jerking.   The absence of  |              |
| epileptiform discharge during the EEG recording   does not rule out    |              |
| the diagnosis of a seizure disorder. Clinical   correlation is         |              |
| recommended.               ______________________________              |              |
| Electronically signed by Ambika Leyva    on 2019 7:25 AM      |              |
+------------------------------------------------------------------------+--------------+
 Comprehensive metabolic panel (2019  5:50 AM)
 
+----------------+--------------------------+-------------------+--------------+
| Component      | Value                    | Ref Range         | Performed At |
+----------------+--------------------------+-------------------+--------------+
| SODIUM         | 136                      | 135 - 145 mmol/L  | [facility]   |
|                |                          |                   | LABORATORY   |
+----------------+--------------------------+-------------------+--------------+
| POTASSIUM      | 4.8                      | 3.5 - 4.9 mmol/L  | [facility]   |
|                |                          |                   | LABORATORY   |
+----------------+--------------------------+-------------------+--------------+
| CHLORIDE       | 98 (L)                   | 99 - 109 mmol/L   | [facility]   |
|                |                          |                   | LABORATORY   |
+----------------+--------------------------+-------------------+--------------+
| CO2            | 26                       | 23 - 32 mmol/L    | [facility]   |
|                |                          |                   | LABORATORY   |
+----------------+--------------------------+-------------------+--------------+
| ANION GAP AGAP | 17                       | 5 - 20 mmol/L     | [facility]   |
|                |                          |                   | LABORATORY   |
+----------------+--------------------------+-------------------+--------------+
| GLUCOSE        | 105 (H)                  | 65 - 99 mg/dL     | [facility]   |
|                |                          |                   | LABORATORY   |
+----------------+--------------------------+-------------------+--------------+
| BUN            | 29 (H)                   | 8 - 25 mg/dL      | [facility]   |
|                |                          |                   | LABORATORY   |
+----------------+--------------------------+-------------------+--------------+
| CREATININE     | 6.7 (H)                  | 0.50 - 1.00 mg/dL | [facility]   |
|                |                          |                   | LABORATORY   |
+----------------+--------------------------+-------------------+--------------+
| BUN/CREAT      | 4                        |                   | [facility]   |
|                |                          |                   | LABORATORY   |
+----------------+--------------------------+-------------------+--------------+
| CALCIUM        | 9.1                      | 8.5 - 10.5 mg/dL  | [facility]   |
|                |                          |                   | LABORATORY   |
+----------------+--------------------------+-------------------+--------------+
| TOTAL PROTEIN  | 6.4                      | 6.3 - 8.2 g/dL    | [facility]   |
|                |                          |                   | LABORATORY   |
+----------------+--------------------------+-------------------+--------------+
| Albumin        | 2.5 (L)                  | 3.3 - 4.8 g/dL    | [facility]   |
|                |                          |                   | LABORATORY   |
+----------------+--------------------------+-------------------+--------------+
| GLOBULIN       | 3.9                      | 1.3 - 4.9 g/dL    | [facility]   |
|                |                          |                   | LABORATORY   |
+----------------+--------------------------+-------------------+--------------+
| A/G            | 0.6 (L)                  | 1.0 - 2.4         | [facility]   |
 
|                |                          |                   | LABORATORY   |
+----------------+--------------------------+-------------------+--------------+
| TBIL           | 0.4                      | 0.1 - 1.5 mg/dL   | [facility]   |
|                |                          |                   | LABORATORY   |
+----------------+--------------------------+-------------------+--------------+
| ALK PHOS       | 79                       | 35 - 115 U/L      | [facility]   |
|                |                          |                   | LABORATORY   |
+----------------+--------------------------+-------------------+--------------+
| AST            | 24                       | 10 - 45 U/L       | [facility]   |
|                |                          |                   | LABORATORY   |
+----------------+--------------------------+-------------------+--------------+
| ALT            | 17                       | 10 - 65 U/L       | [facility]   |
|                |                          |                   | LABORATORY   |
+----------------+--------------------------+-------------------+--------------+
| EGFR           | 6 (L)Comment: GFR <60:   | >60 mL/min/1.73m2 | [facility]   |
|                | CHRONIC KIDNEY DISEASE,  |                   | LABORATORY   |
|                | IF FOUND OVER A 3 MONTH  |                   |              |
|                | PERIOD.GFR <15: KIDNEY   |                   |              |
|                | FAILURE.FOR       |                   |              |
|                | [employer], MULTIPLY THE  |                   |              |
|                | CALCULATED GFR BY        |                   |              |
|                | 1.210.This eGFR is       |                   |              |
|                | calculated using the     |                   |              |
|                | MDRD IDMS traceable      |                   |              |
|                | equation.Testing         |                   |              |
|                | performed at [facility], [address] |                   |              |
|                |  [address],        |                   |              |
|                | [place]    [address]   |                   |              |
+----------------+--------------------------+-------------------+--------------+
 
 
 
+----------+
| Specimen |
+----------+
| Blood    |
+----------+
 
 
 
+---------------+-------------------------+---------------------+----------------+
| Performing    | Address                 | City/State/Zipcode  | Phone Number   |
| Organization  |                         |                     |                |
+---------------+-------------------------+---------------------+----------------+
|   [facility]  |   [address]  | [place] [address] |   [phone] |
| LABORATORY    | [address]                   |                     |                |
+---------------+-------------------------+---------------------+----------------+
 CBC W/Auto Diff (Reflex to Manual) (2019  5:50 AM)
 
+-----------------+--------------------------+-------------------+--------------+
| Component       | Value                    | Ref Range         | Performed At |
+-----------------+--------------------------+-------------------+--------------+
| WBC             | 6.13                     | 3.80 - 11.00 K/uL | [facility]   |
|                 |                          |                   | LABORATORY   |
+-----------------+--------------------------+-------------------+--------------+
| RBC             | 2.60 (L)                 | 3.70 - 5.10 M/uL  | [facility]   |
|                 |                          |                   | LABORATORY   |
+-----------------+--------------------------+-------------------+--------------+
| HGB             | 8.7 (L)                  | 11.3 - 15.5 g/dL  | [facility]   |
|                 |                          |                   | LABORATORY   |
 
+-----------------+--------------------------+-------------------+--------------+
| HCT             | 27.0 (L)                 | 34.0 - 46.0 %     | [facility]   |
|                 |                          |                   | LABORATORY   |
+-----------------+--------------------------+-------------------+--------------+
| MCV             | 103.9 (H)                | 80.0 - 100.0 fl   | [facility]   |
|                 |                          |                   | LABORATORY   |
+-----------------+--------------------------+-------------------+--------------+
| MCH             | 33.6                     | 27.0 - 34.0 pg    | [facility]   |
|                 |                          |                   | LABORATORY   |
+-----------------+--------------------------+-------------------+--------------+
| MCHC            | 32.4                     | 32.0 - 35.5 g/dL  | [facility]   |
|                 |                          |                   | LABORATORY   |
+-----------------+--------------------------+-------------------+--------------+
| RDW SD          | 63.0 (H)                 | 37 - 53 fl        | [facility]   |
|                 |                          |                   | LABORATORY   |
+-----------------+--------------------------+-------------------+--------------+
| PLT             | 156                      | 150 - 400 K/uL    | [facility]   |
|                 |                          |                   | LABORATORY   |
+-----------------+--------------------------+-------------------+--------------+
| MPV             | 9.0                      | fl                | [facility]   |
|                 |                          |                   | LABORATORY   |
+-----------------+--------------------------+-------------------+--------------+
| DIFF TYPE       | AUTOMATED                |                   | [facility]   |
|                 |                          |                   | LABORATORY   |
+-----------------+--------------------------+-------------------+--------------+
| NEUTROPHILS     | 71.63                    | %                 | [facility]   |
|                 |                          |                   | LABORATORY   |
+-----------------+--------------------------+-------------------+--------------+
| LYMPHOCYTES     | 15.72                    | %                 | [facility]   |
|                 |                          |                   | LABORATORY   |
+-----------------+--------------------------+-------------------+--------------+
| MONOCYTES       | 12.18                    | %                 | [facility]   |
|                 |                          |                   | LABORATORY   |
+-----------------+--------------------------+-------------------+--------------+
| EOSINOPHILS     | 0.00                     | %                 | [facility]   |
|                 |                          |                   | LABORATORY   |
+-----------------+--------------------------+-------------------+--------------+
| BASOPHILS       | 0.47                     | %                 | [facility]   |
|                 |                          |                   | LABORATORY   |
+-----------------+--------------------------+-------------------+--------------+
| NEUTROPHILS ABS | 4.39                     | 1.90 - 7.40 K/uL  | [facility]   |
|                 |                          |                   | LABORATORY   |
+-----------------+--------------------------+-------------------+--------------+
| LYMPHOCYTES ABS | 0.96 (L)                 | 1.00 - 3.90 K/uL  | [facility]   |
|                 |                          |                   | LABORATORY   |
+-----------------+--------------------------+-------------------+--------------+
| MONOCYTES ABS   | 0.75                     | 0.00 - 0.80 K/uL  | [facility]   |
|                 |                          |                   | LABORATORY   |
+-----------------+--------------------------+-------------------+--------------+
| EOSINOPHILS ABS | 0.00                     | 0.00 - 0.50 K/uL  | [facility]   |
|                 |                          |                   | LABORATORY   |
+-----------------+--------------------------+-------------------+--------------+
| BASOPHILS ABS   | 0.03Comment: Testing     | 0.00 - 0.10 K/uL  | [facility]   |
|                 | performed at [facility], [address] |                   | LABORATORY   |
|                 |  [address],        |                   |              |
|                 | [place]  [address]     |                   |              |
+-----------------+--------------------------+-------------------+--------------+
 
 
 
 
+----------+
| Specimen |
+----------+
| Blood    |
+----------+
 
 
 
+---------------+-------------------------+---------------------+----------------+
| Performing    | Address                 | City/State/Zipcode  | Phone Number   |
| Organization  |                         |                     |                |
+---------------+-------------------------+---------------------+----------------+
|   [facility]  |   [address]  | [place] [address] |   [phone] |
| LABORATORY    | [address]                   |                     |                |
+---------------+-------------------------+---------------------+----------------+
 POCT glucose (2019  5:45 AM)
 
+--------------------+--------------------------+---------------+-----------------+
| Component          | Value                    | Ref Range     | Performed At    |
+--------------------+--------------------------+---------------+-----------------+
| GLUCOSE,POC SCREEN | 107 (H)Comment: Testing  | 65 - 99 mg/dL | [facility] |
|                    | performed at [facility];[address]     |               |                 |
|                    | [address];[place]   |               |                 |
|                    | [address]                    |               |                 |
+--------------------+--------------------------+---------------+-----------------+
 
 
 
+-------------------+------------------+--------------------+--------------+
| Performing        | Address          | City/State/Zipcode | Phone Number |
| Organization      |                  |                    |              |
+-------------------+------------------+--------------------+--------------+
|   [facility] LAB[facility] |   [address] | [place] [address] |              |
+-------------------+------------------+--------------------+--------------+
 Troponin I (2019 12:45 AM)
 
+------------+--------------------------+-------------------+-----------------+
| Component  | Value                    | Ref Range         | Performed At    |
+------------+--------------------------+-------------------+-----------------+
| TROPONIN I | 0.256 (H)Comment:   0.00 | 0.00 - 0.10 ng/mL | [facility] |
|            |  to 0.10     CONSISTENT  |                   |                 |
|            | WITH NORMAL              |                   |                 |
|            | POPULATION0.11 to        |                   |                 |
|            | 0.60     CONSISTENT WITH |                   |                 |
|            |  INCREASED RISK FOR      |                   |                 |
|            | ADVERSE OUTCOMES>        |                   |                 |
|            | 0.60                     |                   |                 |
|            | CONSISTENT WITH WHO      |                   |                 |
|            | CRITERIA FOR ACUTE MI    |                   |                 |
|            | Testing performed at     |                   |                 |
|            | [facility];[address]            |                   |                 |
|            | [facility];[place] [address]   |                   |                 |
+------------+--------------------------+-------------------+-----------------+
 
 
 
+----------+
| Specimen |
+----------+
| Blood    |
 
+----------+
 
 
 
+-------------------+------------------+--------------------+--------------+
| Performing        | Address          | City/State/Zipcode | Phone Number |
| Organization      |                  |                    |              |
+-------------------+------------------+--------------------+--------------+
|   [facility] |   [address] | [place] [address] |              |
+-------------------+------------------+--------------------+--------------+
 Troponin I (2019  9:10 PM)
 
+------------+--------------------------+-------------------+-----------------+
| Component  | Value                    | Ref Range         | Performed At    |
+------------+--------------------------+-------------------+-----------------+
| TROPONIN I | 0.274 (H)Comment:   0.00 | 0.00 - 0.10 ng/mL | [facility] |
|            |  to 0.10     CONSISTENT  |                   |                 |
|            | WITH NORMAL              |                   |                 |
|            | POPULATION0.11 to        |                   |                 |
|            | 0.60     CONSISTENT WITH |                   |                 |
|            |  INCREASED RISK FOR      |                   |                 |
|            | ADVERSE OUTCOMES>        |                   |                 |
|            | 0.60                     |                   |                 |
|            | CONSISTENT WITH WHO      |                   |                 |
|            | CRITERIA FOR ACUTE MI    |                   |                 |
|            | Testing performed at     |                   |                 |
|            | [facility];[address]            |                   |                 |
|            [address];[place] [address]   |                   |                 |
+------------+--------------------------+-------------------+-----------------+
 
 
 
+----------+
| Specimen |
+----------+
| Blood    |
+----------+
 
 
 
+-------------------+------------------+--------------------+--------------+
| Performing        | Address          | City/State/Zipcode | Phone Number |
| Organization      |                  |                    |              |
+-------------------+------------------+--------------------+--------------+
|   [facility] |   [address] | [place] [address] |              |
+-------------------+------------------+--------------------+--------------+
 POCT glucose (2019  9:08 PM)
 
+--------------------+--------------------------+---------------+-----------------+
| Component          | Value                    | Ref Range     | Performed At    |
+--------------------+--------------------------+---------------+-----------------+
| GLUCOSE,POC SCREEN | 169 (H)Comment: Testing  | 65 - 99 mg/dL | [facility] |
|                    | performed at [facility];[address]     |               |                 |
|                    | [address];[place]   |               |                 |
|                    | [address]                    |               |                 |
+--------------------+--------------------------+---------------+-----------------+
 
 
 
+-------------------+------------------+--------------------+--------------+
 
| Performing        | Address          | City/State/Zipcode | Phone Number |
| Organization      |                  |                    |              |
+-------------------+------------------+--------------------+--------------+
|   [facility] |   [address] | [place] [address] |              |
+-------------------+------------------+--------------------+--------------+
 X-ray chest 1 view (2019  4:43 PM)
 
+-----------------------------------------------------------------------+--------------+
| Impressions                                                           | Performed At |
+-----------------------------------------------------------------------+--------------+
|   1.    Small loculated pleural fluid collection seen overlying the   |   [facility]     |
| lateral left heart border in the lower left chest.    There may also  | RADIOLOGY    |
| be a small pleural effusion at the right lung base which is layering  |              |
| posteriorly.  2.    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         |              |
| Electronically signed by Eugenio Hoffman DO on 2019 4:59 PM       |              |
+-----------------------------------------------------------------------+--------------+
 
 
 
+------------------------------------------------------------------------+--------------+
| Narrative                                                              | Performed At |
+------------------------------------------------------------------------+--------------+
|   CHERIE VILLALOBOS  XR CHEST 1 VIEW  2019 4:43 PM     HISTORY: |   [facility]     |
|   68 years.    Female.    Chest pain.     TECHNIQUE:  1 view of the    | RADIOLOGY    |
| chest obtained at 1637 hours.     COMPARISON:  2018.             |              |
| FINDINGS:  A single lead ICD is seen over the right chest with an      |              |
| intact lead in proper position unchanged from the previous             |              |
| exam.    The sternotomy wires from prior CABG are noted.    An         |              |
| overlying EKG leads is seen.    A presumed loculated pleural fluid     |              |
| collection is seen overlying the left heart border similar to the      |              |
| previous exam.    This measures 3.0 x 5.2 cm in the transverse and     |              |
| craniocaudal dimensions.    Hazy opacity is seen in the lower right    |              |
| chest which may indicate the presence of a right-sided pleural         |              |
| effusion which is layering posteriorly.    No acute airspace disease,  |              |
| parenchymal nodule, mass or pneumothorax is noted.    Apical pleural   |              |
| thickening is seen.    The osseous structures are intact.              |              |
+------------------------------------------------------------------------+--------------+
 
 
 
+-------------------------------------------------------------------------------------------
--------------------------------------------------------------------------------------------
---------------------------------+
| Procedure Note                                                                            
                                                                                            
                                 |
+-------------------------------------------------------------------------------------------
--------------------------------------------------------------------------------------------
---------------------------------+
|   Lauro Baldwin Results In - 2019  5:04 PM [facility]  CHERIE APPLE VILLALOBOS CHEST 1           
                                                                                            
                                 |
| VIEW2019 4:43 PMHISTORY:68 years.  Female.  Chest pain.TECHNIQUE:1 view of the chest  
                                                                                            
                                 |
|  obtained at 1637 hours.COMPARISON:2018.FINDINGS:A single lead ICD is seen over     
                                                                                            
                                 |
| the right chest with an intact lead in proper position unchanged from the previous exam.  
                                                                                            
 
                                 |
|   The sternotomy wires from prior CABG are noted.  An overlying EKG leads is seen.  A     
                                                                                            
                                 |
| presumed loculated pleural fluid collection is seen overlying the left heart border       
                                                                                            
                                 |
| similar to the previous exam.  This measures 3.0 x 5.2 cm in the transverse and           
                                                                                            
                                 |
| craniocaudal dimensions.  Hazy opacity is seen in the lower right chest which may         
                                                                                            
                                 |
| indicate the presence of a right-sided pleural effusion which is layering posteriorly.    
                                                                                            
                                 |
| No acute airspace disease, parenchymal nodule, mass or pneumothorax is noted.  Apical     
                                                                                            
                                 |
| pleural thickening is seen.  The osseous structures are intact.IMPRESSION:1.  Small       
                                                                                            
                                 |
| loculated pleural fluid collection seen overlying the lateral left heart border in the    
                                                                                            
                                 |
| lower left chest.  There may also be a small pleural effusion at the right lung base      
                                                                                            
                                 |
| which is layering posteriorly.2.  Single lead ICD and prior CABG are                      
                                                                                            
                                 |
| noted.Electronically signed by Eugenio Hoffman DO on 2019 4:59 PM                     
                                                                                            
                                 |
|loculated pleural fluid collection is seen overlying the left heart border similar to the p
revious exam.  This measures 3.0 x 5.2 cm in the transverse and craniocaudal dimensions.  Ha
zy opacity is seen in the lower  |
|right chest which may indicate the presence of a right-sided pleural effusion which is laye
ring posteriorly.  No acute airspace disease, parenchymal nodule, mass or pneumothorax is no
ashly.  Apical pleural             |
|thickening is seen.  The osseous structures are intact.                                    
                                                                                            
                                 |
|                                                                                           
                                                                                            
                                 |
|IMPRESSION:                                                                                
                                                                                            
                                |
|1.  Small loculated pleural fluid collection seen overlying the lateral left heart border i
n the lower left chest.  There may also be a small pleural effusion at the right lung base w
hich is layering posteriorly.    |
|2.  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                                              
                                                                                            
                                 |
|                                                                                           
                                                                                            
                                 |
|Electronically signed by Eugenio Hoffman DO on 2019 4:59 PM                            
                                                                                            
 
                                 |
+-------------------------------------------------------------------------------------------
--------------------------------------------------------------------------------------------
---------------------------------+
 
 
 
+--------------------+------------------+--------------------+--------------+
| Performing         | Address          | City/State/Zipcode | Phone Number |
| Organization       |                  |                    |              |
+--------------------+------------------+--------------------+--------------+
|   [facility] |   [address] | [place] [address] |              |
+--------------------+------------------+--------------------+--------------+
 EKG STANDARD 12 LEAD (2019  4:30 PM)
 
+-------------------+--------------------------+-----------+--------------+
| Component         | Value                    | Ref Range | Performed At |
+-------------------+--------------------------+-----------+--------------+
| Ventricular Rate  | 76                       | BPM       | [facility]     |
+-------------------+--------------------------+-----------+--------------+
| Atrial Rate       | 76                       | BPM       | [facility]     |
+-------------------+--------------------------+-----------+--------------+
| P-R Interval      | 158                      | ms        | [facility]     |
+-------------------+--------------------------+-----------+--------------+
| QRS Duration      | 158                      | ms        | [facility]     |
+-------------------+--------------------------+-----------+--------------+
| Q-T Interval      | 490                      | ms        | [facility]     |
+-------------------+--------------------------+-----------+--------------+
| QTC Calculation   | 551                      | ms        | [facility]     |
| (Bezet)           |                          |           |              |
+-------------------+--------------------------+-----------+--------------+
| Calculated P Axis | 87                       | degrees   | [facility]     |
+-------------------+--------------------------+-----------+--------------+
| Calculated R Axis | 39                       | degrees   | [facility]     |
+-------------------+--------------------------+-----------+--------------+
| Calculated T Axis | 54                       | degrees   | [facility]     |
+-------------------+--------------------------+-----------+--------------+
| Diagnosis         | Normal sinus rhythmLeft  |           | [facility]     |
|                   | bundle branch            |           |              |
|                   | blockAbnormal ECG        |           |              |
|                   | Confirmed by Elle    |           |              |
|                   | Celestino RODRIGES ([id number]) on     |           |              |
|                   | 2019 10:11:04 PM     |           |              |
+-------------------+--------------------------+-----------+--------------+
 
 
 
+--------------+-------------------+--------------------+--------------+
| Performing   | Address           | City/State/Zipcode | Phone Number |
| Organization |                   |                    |              |
+--------------+-------------------+--------------------+--------------+
|   [facility]   |   [address] | [place] [address] |              |
+--------------+-------------------+--------------------+--------------+
 CK MB (2019  4:10 PM)
 
+-------------+--------------------------+-----------------+-----------------+
| Component   | Value                    | Ref Range       | Performed At    |
+-------------+--------------------------+-----------------+-----------------+
| MMB         | 1.8                      | 0.5 - 3.6 ng/mL | [facility] |
+-------------+--------------------------+-----------------+-----------------+
 
| CK-MB Index | 8.2Comment:   CK INDEX   |                 | [facility] |
|             | INTERPRETATION:          |                 |                 |
|             |                 MMB      |                 |                 |
|             | ng/mL    &    Relative   |                 |                 |
|             | IndexNon-AMI             |                 |                 |
|             | < or =                   |                 |                 |
|             | 5.0                    N |                 |                 |
|             | AGray Zone               |                 |                 |
|             | >5.0                 <   |                 |                 |
|             | or =                     |                 |                 |
|             | 4.0AMI                   |                 |                 |
|             |                          |                 |                 |
|             | >5.0                     |                 |                 |
|             |     >4.0Testing          |                 |                 |
|             | performed at [facility];[address]     |                 |                 |
|             | [address];[place]   |                 |                 |
|             | [id number]                    |                 |                 |
+-------------+--------------------------+-----------------+-----------------+
 
 
 
+-------------------+------------------+--------------------+--------------+
| Performing        | Address          | City/State/Zipcode | Phone Number |
| Organization      |                  |                    |              |
+-------------------+------------------+--------------------+--------------+
|   [facility] |   [address] | [place] [address] |              |
+-------------------+------------------+--------------------+--------------+
 CPK (2019  4:10 PM)
 
+-----------+-------------------------+--------------+-----------------+
| Component | Value                   | Ref Range    | Performed At    |
+-----------+-------------------------+--------------+-----------------+
| CPK       | 22 (L)Comment: Testing  | 30 - 240 U/L | [facility] |
|           | performed at [facility];[address]    |              |                 |
|           | [address];[place]  |              |                 |
|           | [id number]                   |              |                 |
+-----------+-------------------------+--------------+-----------------+
 
 
 
+-------------------+------------------+--------------------+--------------+
| Performing        | Address          | City/State/Zipcode | Phone Number |
| Organization      |                  |                    |              |
+-------------------+------------------+--------------------+--------------+
|   [facility] |   [address] | [place] [address] |              |
+-------------------+------------------+--------------------+--------------+
 Troponin I (2019  4:10 PM)
 
+------------+--------------------------+-------------------+-----------------+
| Component  | Value                    | Ref Range         | Performed At    |
+------------+--------------------------+-------------------+-----------------+
| TROPONIN I | 0.318 (H)Comment:   0.00 | 0.00 - 0.10 ng/mL | [facility] |
|            |  to 0.10     CONSISTENT  |                   |                 |
|            | WITH NORMAL              |                   |                 |
|            | POPULATION0.11 to        |                   |                 |
|            | 0.60     CONSISTENT WITH |                   |                 |
|            |  INCREASED RISK FOR      |                   |                 |
|            | ADVERSE OUTCOMES>        |                   |                 |
|            | 0.60                     |                   |                 |
|            | CONSISTENT WITH WHO      |                   |                 |
 
|            | CRITERIA FOR ACUTE MI    |                   |                 |
|            | Testing performed at     |                   |                 |
|            | [facility];[address] [place] [address]            |                   |                 |
|            | [address]ione;[place] [address]   |                   |                 |
+------------+--------------------------+-------------------+-----------------+
 
 
 
+----------+
| Specimen |
+----------+
| Blood    |
+----------+
 
 
 
+-------------------+------------------+--------------------+--------------+
| Performing        | Address          | City/State/Zipcode | Phone Number |
| Organization      |                  |                    |              |
+-------------------+------------------+--------------------+--------------+
|   [facility] |   [address] | [facility] [place] [address] |              |
+-------------------+------------------+--------------------+--------------+
 POCT glucose (2019  3:42 PM)
 
+--------------------+--------------------------+---------------+-----------------+
| Component          | Value                    | Ref Range     | Performed At    |
+--------------------+--------------------------+---------------+-----------------+
| GLUCOSE,POC SCREEN | 106 (H)Comment: Testing  | 65 - 99 mg/dL | [facility] |
|                    | performed at [facility];[address]     |               |                 |
|                    | [address];[place]   |               |                 |
|                    | [address]                    |               |                 |
+--------------------+--------------------------+---------------+-----------------+
 
 
 
+-------------------+------------------+--------------------+--------------+
| Performing        | Address          | City/State/Zipcode | Phone Number |
| Organization      |                  |                    |              |
+-------------------+------------------+--------------------+--------------+
|   [facility] |   [address] | [place] [address] |              |
+-------------------+------------------+--------------------+--------------+
 POCT glucose (2019 11:44 AM)
 
+--------------------+--------------------------+---------------+-----------------+
| Component          | Value                    | Ref Range     | Performed At    |
+--------------------+--------------------------+---------------+-----------------+
| GLUCOSE,POC SCREEN | 177 (H)Comment: Testing  | 65 - 99 mg/dL | [facility] |
|                    | performed at [facility];[address]     |               |                 |
|                    | [address];[place]   |               |                 |
|                    | [address]                    |               |                 |
+--------------------+--------------------------+---------------+-----------------+
 
 
 
+-------------------+------------------+--------------------+--------------+
| Performing        | Address          | City/State/Zipcode | Phone Number |
| Organization      |                  |                    |              |
+-------------------+------------------+--------------------+--------------+
|   [facility] |   [address] | [place] [address] |              |
+-------------------+------------------+--------------------+--------------+
 
 POCT glucose (2019  5:55 AM)
 
+--------------------+--------------------------+---------------+-----------------+
| Component          | Value                    | Ref Range     | Performed At    |
+--------------------+--------------------------+---------------+-----------------+
| GLUCOSE,POC SCREEN | 158 (H)Comment: Testing  | 65 - 99 mg/dL | [facility] |
|                    | performed at [facility];[address]     |               |                 |
|                    | [address];[place]   |               |                 |
|                    | [address]                    |               |                 |
+--------------------+--------------------------+---------------+-----------------+
 
 
 
+-------------------+------------------+--------------------+--------------+
| Performing        | Address          | City/State/Zipcode | Phone Number |
| Organization      |                  |                    |              |
+-------------------+------------------+--------------------+--------------+
|   [facility] |   [address] | [place] [address] |              |
+-------------------+------------------+--------------------+--------------+
 Comprehensive metabolic panel (2019  5:32 AM)
 
+----------------+--------------------------+-------------------+--------------+
| Component      | Value                    | Ref Range         | Performed At |
+----------------+--------------------------+-------------------+--------------+
| SODIUM         | 135                      | 135 - 145 mmol/L  | [facility]   |
|                |                          |                   | LABORATORY   |
+----------------+--------------------------+-------------------+--------------+
| POTASSIUM      | 4.8                      | 3.5 - 4.9 mmol/L  | [facility]   |
|                |                          |                   | LABORATORY   |
+----------------+--------------------------+-------------------+--------------+
| CHLORIDE       | 97 (L)                   | 99 - 109 mmol/L   | [facility]   |
|                |                          |                   | LABORATORY   |
+----------------+--------------------------+-------------------+--------------+
| CO2            | 25                       | 23 - 32 mmol/L    | [facility]   |
|                |                          |                   | LABORATORY   |
+----------------+--------------------------+-------------------+--------------+
| ANION GAP AGAP | 18                       | 5 - 20 mmol/L     | [facility]   |
|                |                          |                   | LABORATORY   |
+----------------+--------------------------+-------------------+--------------+
| GLUCOSE        | 166 (H)                  | 65 - 99 mg/dL     | [facility]   |
|                |                          |                   | LABORATORY   |
+----------------+--------------------------+-------------------+--------------+
| BUN            | 45 (H)                   | 8 - 25 mg/dL      | [facility]   |
|                |                          |                   | LABORATORY   |
+----------------+--------------------------+-------------------+--------------+
| CREATININE     | 8.1 (H)                  | 0.50 - 1.00 mg/dL | [facility]   |
|                |                          |                   | LABORATORY   |
+----------------+--------------------------+-------------------+--------------+
| BUN/CREAT      | 6                        |                   | [facility]   |
|                |                          |                   | LABORATORY   |
+----------------+--------------------------+-------------------+--------------+
| CALCIUM        | 9.1                      | 8.5 - 10.5 mg/dL  | [facility]   |
|                |                          |                   | LABORATORY   |
+----------------+--------------------------+-------------------+--------------+
| TOTAL PROTEIN  | 6.0 (L)                  | 6.3 - 8.2 g/dL    | [facility]   |
|                |                          |                   | LABORATORY   |
+----------------+--------------------------+-------------------+--------------+
| Albumin        | 2.3 (L)                  | 3.3 - 4.8 g/dL    | [facility]   |
|                |                          |                   | LABORATORY   |
+----------------+--------------------------+-------------------+--------------+
 
| GLOBULIN       | 3.7                      | 1.3 - 4.9 g/dL    | [facility]   |
|                |                          |                   | LABORATORY   |
+----------------+--------------------------+-------------------+--------------+
| A/G            | 0.6 (L)                  | 1.0 - 2.4         | [facility]   |
|                |                          |                   | LABORATORY   |
+----------------+--------------------------+-------------------+--------------+
| TBIL           | 0.4                      | 0.1 - 1.5 mg/dL   | [facility]   |
|                |                          |                   | LABORATORY   |
+----------------+--------------------------+-------------------+--------------+
| ALK PHOS       | 76                       | 35 - 115 U/L      | [facility]   |
|                |                          |                   | LABORATORY   |
+----------------+--------------------------+-------------------+--------------+
| AST            | 21                       | 10 - 45 U/L       | [facility]   |
|                |                          |                   | LABORATORY   |
+----------------+--------------------------+-------------------+--------------+
| ALT            | 16                       | 10 - 65 U/L       | [facility]   |
|                |                          |                   | LABORATORY   |
+----------------+--------------------------+-------------------+--------------+
| EGFR           | 5 (L)Comment: GFR <60:   | >60 mL/min/1.73m2 | [facility]   |
|                | CHRONIC KIDNEY DISEASE,  |                   | LABORATORY   |
|                | IF FOUND OVER A 3 MONTH  |                   |              |
|                | PERIOD.GFR <15: KIDNEY   |                   |              |
|                | FAILURE.FOR       |                   |              |
|                | [employer], MULTIPLY THE  |                   |              |
|                | CALCULATED GFR BY        |                   |              |
|                | 1.210.This eGFR is       |                   |              |
|                | calculated using the     |                   |              |
|                | MDRD IDMS traceable      |                   |              |
|                | equation.Testing         |                   |              |
|                | performed at [facility], [address] |                   |              |
|                |  [facility],        |                   |              |
|                | [place]    [address]   |                   |              |
+----------------+--------------------------+-------------------+--------------+
 
 
 
+----------+
| Specimen |
+----------+
| Blood    |
+----------+
 
 
 
+---------------+-------------------------+---------------------+----------------+
| Performing    | Address                 | City/State/Zipcode  | Phone Number   |
| Organization  |                         |                     |                |
+---------------+-------------------------+---------------------+----------------+
|   [facility]  |   [address]  | [place] [address] |   [phone] |
| LABORATORY    | [address]                   |                     |                |
+---------------+-------------------------+---------------------+----------------+
 CBC W/Auto Diff (Reflex to Manual) (2019  5:32 AM)
 
+-----------------+--------------------------+-------------------+--------------+
| Component       | Value                    | Ref Range         | Performed At |
+-----------------+--------------------------+-------------------+--------------+
| WBC             | 5.33                     | 3.80 - 11.00 K/uL | [facility]   |
|                 |                          |                   | LABORATORY   |
+-----------------+--------------------------+-------------------+--------------+
| RBC             | 2.66 (L)                 | 3.70 - 5.10 M/uL  | [facility]   |
 
|                 |                          |                   | LABORATORY   |
+-----------------+--------------------------+-------------------+--------------+
| HGB             | 8.9 (L)                  | 11.3 - 15.5 g/dL  | [facility]   |
|                 |                          |                   | LABORATORY   |
+-----------------+--------------------------+-------------------+--------------+
| HCT             | 27.6 (L)                 | 34.0 - 46.0 %     | [facility]   |
|                 |                          |                   | LABORATORY   |
+-----------------+--------------------------+-------------------+--------------+
| MCV             | 103.8 (H)                | 80.0 - 100.0 fl   | [facility]   |
|                 |                          |                   | LABORATORY   |
+-----------------+--------------------------+-------------------+--------------+
| MCH             | 33.3                     | 27.0 - 34.0 pg    | [facility]   |
|                 |                          |                   | LABORATORY   |
+-----------------+--------------------------+-------------------+--------------+
| MCHC            | 32.1                     | 32.0 - 35.5 g/dL  | [facility]   |
|                 |                          |                   | LABORATORY   |
+-----------------+--------------------------+-------------------+--------------+
| RDW SD          | 61.3 (H)                 | 37 - 53 fl        | [facility]   |
|                 |                          |                   | LABORATORY   |
+-----------------+--------------------------+-------------------+--------------+
| PLT             | 151                      | 150 - 400 K/uL    | [facility]   |
|                 |                          |                   | LABORATORY   |
+-----------------+--------------------------+-------------------+--------------+
| MPV             | 9.3                      | fl                | [facility]   |
|                 |                          |                   | LABORATORY   |
+-----------------+--------------------------+-------------------+--------------+
| DIFF TYPE       | AUTOMATED                |                   | [facility]   |
|                 |                          |                   | LABORATORY   |
+-----------------+--------------------------+-------------------+--------------+
| NEUTROPHILS     | 80.16                    | %                 | [facility]   |
|                 |                          |                   | LABORATORY   |
+-----------------+--------------------------+-------------------+--------------+
| LYMPHOCYTES     | 10.59                    | %                 | [facility]   |
|                 |                          |                   | LABORATORY   |
+-----------------+--------------------------+-------------------+--------------+
| MONOCYTES       | 8.80                     | %                 | [facility]   |
|                 |                          |                   | LABORATORY   |
+-----------------+--------------------------+-------------------+--------------+
| EOSINOPHILS     | 0.00                     | %                 | [facility]   |
|                 |                          |                   | LABORATORY   |
+-----------------+--------------------------+-------------------+--------------+
| BASOPHILS       | 0.45                     | %                 | [facility]   |
|                 |                          |                   | LABORATORY   |
+-----------------+--------------------------+-------------------+--------------+
| NEUTROPHILS ABS | 4.28                     | 1.90 - 7.40 K/uL  | [facility]   |
|                 |                          |                   | LABORATORY   |
+-----------------+--------------------------+-------------------+--------------+
| LYMPHOCYTES ABS | 0.57 (L)                 | 1.00 - 3.90 K/uL  | [facility]   |
|                 |                          |                   | LABORATORY   |
+-----------------+--------------------------+-------------------+--------------+
| MONOCYTES ABS   | 0.47                     | 0.00 - 0.80 K/uL  | [facility]   |
|                 |                          |                   | LABORATORY   |
+-----------------+--------------------------+-------------------+--------------+
| EOSINOPHILS ABS | 0.00                     | 0.00 - 0.50 K/uL  | [facility]   |
|                 |                          |                   | LABORATORY   |
+-----------------+--------------------------+-------------------+--------------+
| BASOPHILS ABS   | 0.02Comment: Testing     | 0.00 - 0.10 K/uL  | [facility]   |
|                 | performed at [facility], [address] |                   | LABORATORY   |
|                 |  [address],        |                   |              |
|                 | [place]  [address]     |                   |              |
 
+-----------------+--------------------------+-------------------+--------------+
 
 
 
+----------+
| Specimen |
+----------+
| Blood    |
+----------+
 
 
 
+---------------+-------------------------+---------------------+----------------+
| Performing    | Address                 | City/State/Zipcode  | Phone Number   |
| Organization  |                         |                     |                |
+---------------+-------------------------+---------------------+----------------+
|   [facility]  |   [address]  | [place] [address] |   [phone] |
| LABORATORY    | [address]                   |                     |                |
+---------------+-------------------------+---------------------+----------------+
 Sedimentation rate, automated (2019  5:32 AM)
 
+-----------+--------------------------+--------------+--------------+
| Component | Value                    | Ref Range    | Performed At |
+-----------+--------------------------+--------------+--------------+
| ESR       | 37 (H)Comment: Testing   | 0 - 30 mm/Hr | [facility]   |
|           | performed at [facility], [address] |              | LABORATORY   |
|           |  [address],        |              |              |
|           | [place]  [address]     |              |              |
+-----------+--------------------------+--------------+--------------+
 
 
 
+----------+
| Specimen |
+----------+
| Blood    |
+----------+
 
 
 
+---------------+-------------------------+---------------------+----------------+
| Performing    | Address                 | City/State/Zipcode  | Phone Number   |
| Organization  |                         |                     |                |
+---------------+-------------------------+---------------------+----------------+
|   [facility]  |   [address]  | [place] [address] |   [phone] |
| LABORATORY    | [address]                   |                     |                |
+---------------+-------------------------+---------------------+----------------+
 C-reactive protein (2019  5:32 AM)
 
+-----------+--------------------------+------------+--------------+
| Component | Value                    | Ref Range  | Performed At |
+-----------+--------------------------+------------+--------------+
| CRP       | 7.1 (H)Comment: Testing  | <0.5 mg/dL | [facility]   |
|           | performed at [facility], [address] |            | LABORATORY   |
|           |  [place],        |            |              |
|           | [place]  [address]     |            |              |
+-----------+--------------------------+------------+--------------+
 
 
 
 
+----------+
| Specimen |
+----------+
| Blood    |
+----------+
 
 
 
+---------------+-------------------------+---------------------+----------------+
| Performing    | Address                 | City/State/Zipcode  | Phone Number   |
| Organization  |                         |                     |                |
+---------------+-------------------------+---------------------+----------------+
|   [facility]  |   [address]  | [place] [address] |   [phone] |
| LABORATORY    | [address]                   |                     |                |
+---------------+-------------------------+---------------------+----------------+
 CPK (2019  5:32 AM)
 
+-----------+-------------------------+--------------+-----------------+
| Component | Value                   | Ref Range    | Performed At    |
+-----------+-------------------------+--------------+-----------------+
| CPK       | 23 (L)Comment: Testing  | 30 - 240 U/L | [facility] LABORATORY |
|           | performed at [facility];[address]    |              |                 |
|           | [address];[place], [place]  |              |                 |
|           | [address]                   |              |                 |
+-----------+-------------------------+--------------+-----------------+
 
 
 
+----------+
| Specimen |
+----------+
| Blood    |
+----------+
 
 
 
+-------------------+------------------+--------------------+--------------+
| Performing        | Address          | City/State/Zipcode | Phone Number |
| Organization      |                  |                    |              |
+-------------------+------------------+--------------------+--------------+
|   [facility] LABORATORY |   [address] | [place] [address] |              |
+-------------------+------------------+--------------------+--------------+
 CBC w/manual diff (2019  5:32 AM)
 
+----------------------+--------------------------------------------------------------------
-----+-------------------+--------------+
| Component            | Value                                                              
     | Ref Range         | Performed At |
+----------------------+--------------------------------------------------------------------
-----+-------------------+--------------+
| WBC                  | 5.33                                                               
     | 3.80 - 11.00 K/uL | [facility]   |
|                      |                                                                    
     |                   | LABORATORY   |
+----------------------+--------------------------------------------------------------------
-----+-------------------+--------------+
| RBC                  | 2.66 (L)                                                           
     | 3.70 - 5.10 M/uL  | [facility]   |
|                      |                                                                    
     |                   | LABORATORY   |
 
+----------------------+--------------------------------------------------------------------
-----+-------------------+--------------+
| HGB                  | 8.9 (L)                                                            
     | 11.3 - 15.5 g/dL  | [facility]   |
|                      |                                                                    
     |                   | LABORATORY   |
+----------------------+--------------------------------------------------------------------
-----+-------------------+--------------+
| HCT                  | 27.6 (L)                                                           
     | 34.0 - 46.0 %     | [facility]   |
|                      |                                                                    
     |                   | LABORATORY   |
+----------------------+--------------------------------------------------------------------
-----+-------------------+--------------+
| MCV                  | 103.8 (H)                                                          
     | 80.0 - 100.0 fl   | [facility]   |
|                      |                                                                    
     |                   | LABORATORY   |
+----------------------+--------------------------------------------------------------------
-----+-------------------+--------------+
| MCH                  | 33.3                                                               
     | 27.0 - 34.0 pg    | [facility]   |
|                      |                                                                    
     |                   | LABORATORY   |
+----------------------+--------------------------------------------------------------------
-----+-------------------+--------------+
| MCHC                 | 32.1                                                               
     | 32.0 - 35.5 g/dL  | [facility]   |
|                      |                                                                    
     |                   | LABORATORY   |
+----------------------+--------------------------------------------------------------------
-----+-------------------+--------------+
| RDW SD               | 61.3 (H)                                                           
     | 37 - 53 fl        | [facility]   |
|                      |                                                                    
     |                   | LABORATORY   |
+----------------------+--------------------------------------------------------------------
-----+-------------------+--------------+
| PLT                  | 151                                                                
     | 150 - 400 K/uL    | [facility]   |
|                      |                                                                    
     |                   | LABORATORY   |
+----------------------+--------------------------------------------------------------------
-----+-------------------+--------------+
| MPV                  | 9.3                                                                
     | fl                | [facility]   |
|                      |                                                                    
     |                   | LABORATORY   |
+----------------------+--------------------------------------------------------------------
-----+-------------------+--------------+
| DIFF TYPE            | MANUAL                                                             
     |                   | [facility]   |
|                      |                                                                    
     |                   | LABORATORY   |
+----------------------+--------------------------------------------------------------------
-----+-------------------+--------------+
| Neutrophils Manual   | 86                                                                 
     | %                 | [facility]   |
|                      |                                                                    
     |                   | LABORATORY   |
 
+----------------------+--------------------------------------------------------------------
-----+-------------------+--------------+
| Lymphocytes Manual   | 10                                                                 
     | %                 | [facility]   |
|                      |                                                                    
     |                   | LABORATORY   |
+----------------------+--------------------------------------------------------------------
-----+-------------------+--------------+
| Monocytes Manual     | 4                                                                  
     | %                 | [facility]   |
|                      |                                                                    
     |                   | LABORATORY   |
+----------------------+--------------------------------------------------------------------
-----+-------------------+--------------+
| Neutrophils Absolute | 4.59                                                               
     | 1.90 - 7.40 K/uL  | [facility]   |
|                      |                                                                    
     |                   | LABORATORY   |
+----------------------+--------------------------------------------------------------------
-----+-------------------+--------------+
| Lymphocytes Absolute | 0.53 (L)                                                           
     | 1.00 - 3.90 K/uL  | [facility]   |
|                      |                                                                    
     |                   | LABORATORY   |
+----------------------+--------------------------------------------------------------------
-----+-------------------+--------------+
| Monocytes Absolute   | 0.21                                                               
     | 0.00 - 0.80 K/uL  | [facility]   |
|                      |                                                                    
     |                   | LABORATORY   |
+----------------------+--------------------------------------------------------------------
-----+-------------------+--------------+
| MORPHOLOGY           | 1+Comment:                                                         
     |                   | [facility]   |
|                      | ANISO1+MACRONORMAL PLT                                             
     |                   | LABORATORY   |
|                      | MORPHTesting performed                                             
     |                   |              |
|                      | at [facility], [address]                                                     
     |                   |              |
|                      | [address],                                                   
     |                   |              |
|                      | [place]    [address]                                             
     |                   |              |
|                      |Testing performed at [facility], [address], [place], [place]    [address] |                   |              |
|                      |                                                                    
     |                   |              |
+----------------------+--------------------------------------------------------------------
-----+-------------------+--------------+
 
 
 
+-------------------+
| Specimen          |
+-------------------+
| Blood - Arm, Left |
+-------------------+
 
 
 
 
+---------------+-------------------------+---------------------+----------------+
| Performing    | Address                 | City/State/Zipcode  | Phone Number   |
| Organization  |                         |                     |                |
+---------------+-------------------------+---------------------+----------------+
|   [facility]  |   [address]  | [place] [address] |   [phone] |
| LABORATORY    | [address]                   |                     |                |
+---------------+-------------------------+---------------------+----------------+
 POCT glucose (2018  9:04 PM)
 
+--------------------+--------------------------+---------------+-----------------+
| Component          | Value                    | Ref Range     | Performed At    |
+--------------------+--------------------------+---------------+-----------------+
| GLUCOSE,POC SCREEN | 230 (H)Comment: Testing  | 65 - 99 mg/dL | [facility] |
|                    | performed at [facility];[address]     |               |                 |
|                    | [address];[place], [place]   |               |                 |
|                    | [address]                    |               |                 |
+--------------------+--------------------------+---------------+-----------------+
 
 
 
+-------------------+------------------+--------------------+--------------+
| Performing        | Address          | City/State/Zipcode | Phone Number |
| Organization      |                  |                    |              |
+-------------------+------------------+--------------------+--------------+
|   [facility] LAB[facility] |   [address] | [place] [address] |              |
+-------------------+------------------+--------------------+--------------+
 CT head without contrast (2018  5:23 PM)
 
+------------------------------------------------------------------------+--------------+
| Impressions                                                            | Performed At |
+------------------------------------------------------------------------+--------------+
|   1.    No acute intracranial pathology.     Electronically signed by  |   [facility]     |
| Steven Samano MD on 2018 5:28 PM                             | RADIOLOGY    |
+------------------------------------------------------------------------+--------------+
 
 
 
+------------------------------------------------------------------------+--------------+
| Narrative                                                              | Performed At |
+------------------------------------------------------------------------+--------------+
|   CHERIE VILLALOBOS  1950  68 years Female  CT HEAD WO CONTRAST |   [facility]     |
|   2018 5:23 PM     INDICATION: Altered mental status in a        | RADIOLOGY    |
| patient with acute osteomyelitis     COMPARISON: Head CT, 2017    |              |
|   TECHNIQUE: CT scan of the head without contrast.    5-mm axial       |              |
| noncontrast images were acquired from the foramen magnum through the   |              |
| cranial vertex. Multiplanar reformations were obtained from the        |              |
| acquisition data.     At least one of the following CT dose            |              |
| optimization techniques were used: Automated exposure control;         |              |
| Adjustment of mA and/or kV according to patient size; Use of iterative |              |
|  reconstruction technique.     FINDINGS:     Brain: No intracranial    |              |
| hemorrhage, midline shift or pathologic mass effect. No cerebral       |              |
| edema, mass lesion or evidence of acute infarct.     Ventricles and    |              |
| extra-axial fluid spaces: Normal.     Paranasal sinuses and mastoid    |              |
| air cells: Normal.     Calvarium and extracranial soft tissues:        |              |
| Normal.     Orbits: The patient is status post bilateral cataract      |              |
| surgery.                                                               |              |
+------------------------------------------------------------------------+--------------+
 
 
 
 
+-------------------------------------------------------------------------------------------
----------------------------------------------+
| Procedure Note                                                                            
                                              |
+-------------------------------------------------------------------------------------------
----------------------------------------------+
|   Denny, Rad Results In - 2018  5:33 PM PST  [id number]     
                                              |
| FemaleCT HEAD WO [id number]/31/2018 5:23 PMINDICATION: Altered mental status in a         
                                              |
| patient with acute osteomyelitisCOMPARISON: Head CT, 2017TECHNIQUE: CT scan of the   
                                              |
| head without contrast.  5-mm axial noncontrast images were acquired from the foramen      
                                              |
| magnum through the cranial vertex. Multiplanar reformations were obtained from the        
                                              |
| acquisition data.At least one of the following CT dose optimization techniques were       
                                              |
| used: Automated exposure control; Adjustment of mA and/or kV according to patient size;   
                                              |
| Use of iterative reconstruction technique.FINDINGS:Brain: No intracranial hemorrhage,     
                                              |
| midline shift or pathologic mass effect. No cerebral edema, mass lesion or evidence of    
                                              |
| acute infarct.Ventricles and extra-axial fluid spaces: Normal.Paranasal sinuses and       
                                              |
| mastoid air cells: Normal.Calvarium and extracranial soft tissues: Normal.Orbits: The     
                                              |
| patient is status post bilateral cataract surgery.IMPRESSION:1.  No acute intracranial    
                                              |
| pathology.Electronically signed by Steven Samano MD on 2018 5:28 PM             
                                              |
|FINDINGS:                                                                                  
                                             |
|Brain: No intracranial hemorrhage, midline shift or pathologic mass effect. No cerebral kt
ma, mass lesion or evidence of acute infarct. |
|                                                                                           
                                              |
|Ventricles and extra-axial fluid spaces: Normal.                                           
                                              |
|Paranasal sinuses and mastoid air cells: Normal.                                           
                                              |
|Calvarium and extracranial soft tissues: Normal.                                           
                                              |
|Orbits: The patient is status post bilateral cataract surgery.                             
                                              |
|IMPRESSION:                                                                                
 
                                             |
|1.  No acute intracranial pathology.                                                       
                                              |
|Electronically signed by Steven Samano MD on 2018 5:28 PM                        
                                              |
+-------------------------------------------------------------------------------------------
----------------------------------------------+
 
 
 
+--------------------+------------------+--------------------+--------------+
| Performing         | Address          | City/State/Zipcode | Phone Number |
| Organization       |                  |                    |              |
+--------------------+------------------+--------------------+--------------+
|   [facility] |   [address] | [place] [address] |              |
+--------------------+------------------+--------------------+--------------+
 POCT glucose (2018  4:34 PM)
 
+--------------------+--------------------------+---------------+-----------------+
| Component          | Value                    | Ref Range     | Performed At    |
+--------------------+--------------------------+---------------+-----------------+
| GLUCOSE,POC SCREEN | 160 (H)Comment: Testing  | 65 - 99 mg/dL | [facility] |
|                    | performed at [facility];[address]     |               |                 |
|                    | [address];[place]   |               |                 |
|                    | [address]                    |               |                 |
+--------------------+--------------------------+---------------+-----------------+
 
 
 
+-------------------+------------------+--------------------+--------------+
| Performing        | Address          | City/State/Zipcode | Phone Number |
| Organization      |                  |                    |              |
+-------------------+------------------+--------------------+--------------+
|   [facility] |   [address] | [place] [address] |              |
+-------------------+------------------+--------------------+--------------+
 POCT glucose (2018 11:17 AM)
 
+--------------------+--------------------------+---------------+-----------------+
| Component          | Value                    | Ref Range     | Performed At    |
+--------------------+--------------------------+---------------+-----------------+
| GLUCOSE,POC SCREEN | 241 (H)Comment: Testing  | 65 - 99 mg/dL | [facility] |
|                    | performed at [facility];[address]     |               |                 |
|                    | [address];[place]   |               |                 |
|                    | [id number]                    |               |                 |
+--------------------+--------------------------+---------------+-----------------+
 
 
 
+-------------------+------------------+--------------------+--------------+
| Performing        | Address          | City/State/Zipcode | Phone Number |
| Organization      |                  |                    |              |
+-------------------+------------------+--------------------+--------------+
|   [facility] |   [address] | [place] [address] |              |
+-------------------+------------------+--------------------+--------------+
 POCT glucose (2018  6:26 AM)
 
+--------------------+--------------------------+---------------+-----------------+
| Component          | Value                    | Ref Range     | Performed At    |
 
+--------------------+--------------------------+---------------+-----------------+
| GLUCOSE,POC SCREEN | 141 (H)Comment: Testing  | 65 - 99 mg/dL | [facility] |
|                    | performed at [facility];[address]     |               |                 |
|                    | [address];[place]   |               |                 |
|                    | [id number]                    |               |                 |
+--------------------+--------------------------+---------------+-----------------+
 
 
 
+-------------------+------------------+--------------------+--------------+
| Performing        | Address          | City/State/Zipcode | Phone Number |
| Organization      |                  |                    |              |
+-------------------+------------------+--------------------+--------------+
|   [facility] |   [address] | [place] [address] |              |
+-------------------+------------------+--------------------+--------------+
 Vancomycin,Random (2018  6:17 AM)
 
+-------------------+-------------------------+-----------+-----------------+
| Component         | Value                   | Ref Range | Performed At    |
+-------------------+-------------------------+-----------+-----------------+
| VANCOMYCIN,RANDOM | 16.85Comment: Testing   | ug/mL     | [facility] |
|                   | performed at [facility];[address]    |           |                 |
|                   | [address];[place]  |           |                 |
|                   | [id number]                   |           |                 |
+-------------------+-------------------------+-----------+-----------------+
 
 
 
+----------+
| Specimen |
+----------+
| Blood    |
+----------+
 
 
 
+-------------------+------------------+--------------------+--------------+
| Performing        | Address          | City/State/Zipcode | Phone Number |
| Organization      |                  |                    |              |
+-------------------+------------------+--------------------+--------------+
|   [facility] |   [address] | [place] [address] |              |
+-------------------+------------------+--------------------+--------------+
 POCT glucose (2018  5:37 AM)
 
+--------------------+--------------------------+---------------+-----------------+
| Component          | Value                    | Ref Range     | Performed At    |
+--------------------+--------------------------+---------------+-----------------+
| GLUCOSE,POC SCREEN | 123 (H)Comment: Testing  | 65 - 99 mg/dL | [facility] |
|                    | performed at [facility];[address]     |               |                 |
|                    | [address];[place], [place]   |               |                 |
|                    | [id number]                    |               |                 |
+--------------------+--------------------------+---------------+-----------------+
 
 
 
+-------------------+------------------+--------------------+--------------+
| Performing        | Address          | City/State/Zipcode | Phone Number |
| Organization      |                  |                    |              |
+-------------------+------------------+--------------------+--------------+
|   [facility] |   [address] | [place] [address] |              |
 
+-------------------+------------------+--------------------+--------------+
 POCT glucose (2018  9:10 PM)
 
+--------------------+--------------------------+---------------+-----------------+
| Component          | Value                    | Ref Range     | Performed At    |
+--------------------+--------------------------+---------------+-----------------+
| GLUCOSE,POC SCREEN | 154 (H)Comment: Testing  | 65 - 99 mg/dL | [facility] |
|                    | performed at [facility];[address]     |               |                 |
|                    | [address];[place]   |               |                 |
|                    | [address]                    |               |                 |
+--------------------+--------------------------+---------------+-----------------+
 
 
 
+-------------------+------------------+--------------------+--------------+
| Performing        | Address          | City/State/Zipcode | Phone Number |
| Organization      |                  |                    |              |
+-------------------+------------------+--------------------+--------------+
|   [facility] |   [address] | [place] [address] |              |
+-------------------+------------------+--------------------+--------------+
 POCT glucose (2018  4:07 PM)
 
+--------------------+--------------------------+---------------+-----------------+
| Component          | Value                    | Ref Range     | Performed At    |
+--------------------+--------------------------+---------------+-----------------+
| GLUCOSE,POC SCREEN | 100 (H)Comment: Testing  | 65 - 99 mg/dL | [facility] |
|                    | performed at [facility];[address]     |               |                 |
|                    | [address];[place]   |               |                 |
|                    | [address]                    |               |                 |
+--------------------+--------------------------+---------------+-----------------+
 
 
 
+-------------------+------------------+--------------------+--------------+
| Performing        | Address          | City/State/Zipcode | Phone Number |
| Organization      |                  |                    |              |
+-------------------+------------------+--------------------+--------------+
|   [facility] |   [address] | [place] [address] |              |
+-------------------+------------------+--------------------+--------------+
 POCT glucose (2018 10:57 AM)
 
+--------------------+--------------------------+---------------+-----------------+
| Component          | Value                    | Ref Range     | Performed At    |
+--------------------+--------------------------+---------------+-----------------+
| GLUCOSE,POC SCREEN | 216 (H)Comment: Testing  | 65 - 99 mg/dL | [facility] |
|                    | performed at [facility];[address]     |               |                 |
|                    | [address];[place]   |               |                 |
|                    | [address]                    |               |                 |
+--------------------+--------------------------+---------------+-----------------+
 
 
 
+-------------------+------------------+--------------------+--------------+
| Performing        | Address          | City/State/Zipcode | Phone Number |
| Organization      |                  |                    |              |
+-------------------+------------------+--------------------+--------------+
|   [facility] |   [address] | [place] [address] |              |
+-------------------+------------------+--------------------+--------------+
 Comprehensive metabolic panel (2018  5:47 AM)
 
 
+----------------+--------------------------+-------------------+-----------------+
| Component      | Value                    | Ref Range         | Performed At    |
+----------------+--------------------------+-------------------+-----------------+
| SODIUM         | 135                      | 135 - 145 mmol/L  | [facility] |
+----------------+--------------------------+-------------------+-----------------+
| POTASSIUM      | 4.4                      | 3.5 - 4.9 mmol/L  | [facility] |
+----------------+--------------------------+-------------------+-----------------+
| CHLORIDE       | 95 (L)                   | 99 - 109 mmol/L   | [facility] |
+----------------+--------------------------+-------------------+-----------------+
| CO2            | 29                       | 23 - 32 mmol/L    | [facility] |
+----------------+--------------------------+-------------------+-----------------+
| ANION GAP AGAP | 15                       | 5 - 20 mmol/L     | [facility] |
+----------------+--------------------------+-------------------+-----------------+
| GLUCOSE        | 92                       | 65 - 99 mg/dL     | [facility] |
+----------------+--------------------------+-------------------+-----------------+
| BUN            | 21                       | 8 - 25 mg/dL      | [facility] |
+----------------+--------------------------+-------------------+-----------------+
| CREATININE     | 4.3 (H)                  | 0.50 - 1.00 mg/dL | [facility] |
+----------------+--------------------------+-------------------+-----------------+
| BUN/CREAT      | 5                        |                   | [facility] |
+----------------+--------------------------+-------------------+-----------------+
| CALCIUM        | 8.8                      | 8.5 - 10.5 mg/dL  | [facility] |
+----------------+--------------------------+-------------------+-----------------+
| TOTAL PROTEIN  | 6.3                      | 6.3 - 8.2 g/dL    | [facility] |
+----------------+--------------------------+-------------------+-----------------+
| Albumin        | 2.5 (L)                  | 3.3 - 4.8 g/dL    | [facility] |
+----------------+--------------------------+-------------------+-----------------+
| GLOBULIN       | 3.7                      | 1.3 - 4.9 g/dL    | [facility] |
+----------------+--------------------------+-------------------+-----------------+
| A/G            | 0.7 (L)                  | 1.0 - 2.4         | [facility] |
+----------------+--------------------------+-------------------+-----------------+
| TBIL           | 0.6                      | 0.1 - 1.5 mg/dL   | [facility] |
+----------------+--------------------------+-------------------+-----------------+
| ALK PHOS       | 76                       | 35 - 115 U/L      | [facility] |
+----------------+--------------------------+-------------------+-----------------+
| AST            | 24                       | 10 - 45 U/L       | [facility] |
+----------------+--------------------------+-------------------+-----------------+
| ALT            | 13                       | 10 - 65 U/L       | [facility] |
+----------------+--------------------------+-------------------+-----------------+
| EGFR           | 10 (L)Comment: GFR <60:  | >60 mL/min/1.73m2 | [facility] |
|                | CHRONIC KIDNEY DISEASE,  |                   |                 |
|                | IF FOUND OVER A 3 MONTH  |                   |                 |
|                | PERIOD.GFR <15: KIDNEY   |                   |                 |
|                | FAILURE.FOR       |                   |                 |
|                | [employer], MULTIPLY THE  |                   |                 |
|                | CALCULATED GFR BY        |                   |                 |
|                | 1.210.This eGFR is       |                   |                 |
|                | calculated using the     |                   |                 |
|                | [facility] traceable      |                   |                 |
|                | equation.Testing         |                   |                 |
|                | performed at [facility];[address]     |                   |                 |
|                | [address];[place], [place]   |                   |                 |
|                | [id number]                    |                   |                 |
+----------------+--------------------------+-------------------+-----------------+
 
 
 
+----------+
| Specimen |
+----------+
 
| Blood    |
+----------+
 
 
 
+-------------------+------------------+--------------------+--------------+
| Performing        | Address          | City/State/Zipcode | Phone Number |
| Organization      |                  |                    |              |
+-------------------+------------------+--------------------+--------------+
|   [facility] |   [address] | [place] [address] |              |
+-------------------+------------------+--------------------+--------------+
 CBC W/Auto Diff (Reflex to Manual) (2018  5:47 AM)
 
+-----------------+-------------------------+-------------------+-----------------+
| Component       | Value                   | Ref Range         | Performed At    |
+-----------------+-------------------------+-------------------+-----------------+
| WBC             | 7.20                    | 3.80 - 11.00 K/uL | [facility] |
+-----------------+-------------------------+-------------------+-----------------+
| RBC             | 2.75 (L)                | 3.70 - 5.10 M/uL  | [facility] |
+-----------------+-------------------------+-------------------+-----------------+
| HGB             | 9.5 (L)                 | 11.3 - 15.5 g/dL  | [facility] |
+-----------------+-------------------------+-------------------+-----------------+
| HCT             | 28.7 (L)                | 34.0 - 46.0 %     | [facility] |
+-----------------+-------------------------+-------------------+-----------------+
| MCV             | 104.3 (H)               | 80.0 - 100.0 fl   | [facility] |
+-----------------+-------------------------+-------------------+-----------------+
| MCH             | 34.5 (H)                | 27.0 - 34.0 pg    | [facility] |
+-----------------+-------------------------+-------------------+-----------------+
| MCHC            | 33.1                    | 32.0 - 35.5 g/dL  | [facility] |
+-----------------+-------------------------+-------------------+-----------------+
| RDW SD          | 61.7 (H)                | 37 - 53 fl        | [facility] |
+-----------------+-------------------------+-------------------+-----------------+
| PLT             | 111 (L)                 | 150 - 400 K/uL    | [facility] |
+-----------------+-------------------------+-------------------+-----------------+
| MPV             | 8.3                     | fl                | [facility] |
+-----------------+-------------------------+-------------------+-----------------+
| DIFF TYPE       | AUTOMATED               |                   | [facility] |
+-----------------+-------------------------+-------------------+-----------------+
| NEUTROPHILS     | 76.57                   | %                 | [facility] |
+-----------------+-------------------------+-------------------+-----------------+
| LYMPHOCYTES     | 11.63                   | %                 | [facility] |
+-----------------+-------------------------+-------------------+-----------------+
| MONOCYTES       | 11.26                   | %                 | [facility] |
+-----------------+-------------------------+-------------------+-----------------+
| EOSINOPHILS     | 0.01                    | %                 | [facility] |
+-----------------+-------------------------+-------------------+-----------------+
| BASOPHILS       | 0.53                    | %                 | [facility] |
+-----------------+-------------------------+-------------------+-----------------+
| NEUTROPHILS ABS | 5.51                    | 1.90 - 7.40 K/uL  | [facility] |
+-----------------+-------------------------+-------------------+-----------------+
| LYMPHOCYTES ABS | 0.84 (L)                | 1.00 - 3.90 K/uL  | [facility] |
+-----------------+-------------------------+-------------------+-----------------+
| MONOCYTES ABS   | 0.81 (H)                | 0.00 - 0.80 K/uL  | [facility] |
+-----------------+-------------------------+-------------------+-----------------+
| EOSINOPHILS ABS | 0.00                    | 0.00 - 0.50 K/uL  | [facility] |
+-----------------+-------------------------+-------------------+-----------------+
| BASOPHILS ABS   | 0.04Comment: Testing    | 0.00 - 0.10 K/uL  | [facility] |
|                 | performed at [facility]    |                   |                 |
|                 | [address];[place], [place]  |                   |                 |
|                 | [id number]                   |                   |                 |
 
+-----------------+-------------------------+-------------------+-----------------+
 
 
 
+----------+
| Specimen |
+----------+
| Blood    |
+----------+
 
 
 
+-------------------+------------------+--------------------+--------------+
| Performing        | Address          | City/State/Zipcode | Phone Number |
| Organization      |                  |                    |              |
+-------------------+------------------+--------------------+--------------+
|   [facility] |   [address] | [place] [address] |              |
+-------------------+------------------+--------------------+--------------+
 POCT glucose (2018  5:15 AM)
 
+--------------------+-------------------------+---------------+-----------------+
| Component          | Value                   | Ref Range     | Performed At    |
+--------------------+-------------------------+---------------+-----------------+
| GLUCOSE,POC SCREEN | 99Comment: Testing      | 65 - 99 mg/dL | [facility] |
|                    | performed at [facility];[address]    |               |                 |
|                    | [address];[place]  |               |                 |
|                    | [id number]                   |               |                 |
+--------------------+-------------------------+---------------+-----------------+
 
 
 
+-------------------+------------------+--------------------+--------------+
| Performing        | Address          | City/State/Zipcode | Phone Number |
| Organization      |                  |                    |              |
+-------------------+------------------+--------------------+--------------+
|   [facility] |   [address] | [place] [address] |              |
+-------------------+------------------+--------------------+--------------+
 POCT glucose (2018  8:29 PM)
 
+--------------------+--------------------------+---------------+-----------------+
| Component          | Value                    | Ref Range     | Performed At    |
+--------------------+--------------------------+---------------+-----------------+
| GLUCOSE,POC SCREEN | 182 (H)Comment: Testing  | 65 - 99 mg/dL | [facility] |
|                    | performed at [facility];[address]     |               |                 |
|                    | [address];[place]   |               |                 |
|                    | [id number]                    |               |                 |
+--------------------+--------------------------+---------------+-----------------+
 
 
 
+-------------------+------------------+--------------------+--------------+
| Performing        | Address          | City/State/Zipcode | Phone Number |
| Organization      |                  |                    |              |
+-------------------+------------------+--------------------+--------------+
|   [facility] |   [address] | [place] [address] |              |
+-------------------+------------------+--------------------+--------------+
 POCT glucose (2018  4:44 PM)
 
+--------------------+--------------------------+---------------+-----------------+
| Component          | Value                    | Ref Range     | Performed At    |
 
+--------------------+--------------------------+---------------+-----------------+
| GLUCOSE,POC SCREEN | 146 (H)Comment: Testing  | 65 - 99 mg/dL | [facility] |
|                    | performed at [facility];[address]     |               |                 |
|                    | [address];[place]   |               |                 |
|                    | [address]                    |               |                 |
+--------------------+--------------------------+---------------+-----------------+
 
 
 
+-------------------+------------------+--------------------+--------------+
| Performing        | Address          | City/State/Zipcode | Phone Number |
| Organization      |                  |                    |              |
+-------------------+------------------+--------------------+--------------+
|   [facility] |   [address] | [place] [address] |              |
+-------------------+------------------+--------------------+--------------+
 POCT glucose (2018 11:38 AM)
 
+--------------------+--------------------------+---------------+-----------------+
| Component          | Value                    | Ref Range     | Performed At    |
+--------------------+--------------------------+---------------+-----------------+
| GLUCOSE,POC SCREEN | 139 (H)Comment: Testing  | 65 - 99 mg/dL | [facility] |
|                    | performed at [facility];[address]     |               |                 |
|                    | [address];[place]   |               |                 |
|                    | [address]                    |               |                 |
+--------------------+--------------------------+---------------+-----------------+
 
 
 
+-------------------+------------------+--------------------+--------------+
| Performing        | Address          | City/State/Zipcode | Phone Number |
| Organization      |                  |                    |              |
+-------------------+------------------+--------------------+--------------+
|   [facility] |   [address] | [place] [address] |              |
+-------------------+------------------+--------------------+--------------+
 Comprehensive metabolic panel (2018  5:50 AM)
 
+----------------+--------------------------+-------------------+--------------+
| Component      | Value                    | Ref Range         | Performed At |
+----------------+--------------------------+-------------------+--------------+
| SODIUM         | 137                      | 135 - 145 mmol/L  | [facility]   |
|                |                          |                   | LABORATORY   |
+----------------+--------------------------+-------------------+--------------+
| POTASSIUM      | 4.9                      | 3.5 - 4.9 mmol/L  | [facility]   |
|                |                          |                   | LABORATORY   |
+----------------+--------------------------+-------------------+--------------+
| CHLORIDE       | 99                       | 99 - 109 mmol/L   | [facility]   |
|                |                          |                   | LABORATORY   |
+----------------+--------------------------+-------------------+--------------+
| CO2            | 23                       | 23 - 32 mmol/L    | [facility]   |
|                |                          |                   | LABORATORY   |
+----------------+--------------------------+-------------------+--------------+
| ANION GAP AGAP | 20                       | 5 - 20 mmol/L     | [facility]   |
|                |                          |                   | LABORATORY   |
+----------------+--------------------------+-------------------+--------------+
| GLUCOSE        | 138 (H)                  | 65 - 99 mg/dL     | [facility]   |
|                |                          |                   | LABORATORY   |
+----------------+--------------------------+-------------------+--------------+
| BUN            | 34 (H)                   | 8 - 25 mg/dL      | [facility]   |
|                |                          |                   | LABORATORY   |
+----------------+--------------------------+-------------------+--------------+
 
| CREATININE     | 6.6 (H)                  | 0.50 - 1.00 mg/dL | [facility]   |
|                |                          |                   | LABORATORY   |
+----------------+--------------------------+-------------------+--------------+
| BUN/CREAT      | 5                        |                   | [facility]   |
|                |                          |                   | LABORATORY   |
+----------------+--------------------------+-------------------+--------------+
| CALCIUM        | 8.7                      | 8.5 - 10.5 mg/dL  | [facility]   |
|                |                          |                   | LABORATORY   |
+----------------+--------------------------+-------------------+--------------+
| TOTAL PROTEIN  | 6.1 (L)                  | 6.3 - 8.2 g/dL    | [facility]   |
|                |                          |                   | LABORATORY   |
+----------------+--------------------------+-------------------+--------------+
| Albumin        | 2.2 (L)                  | 3.3 - 4.8 g/dL    | [facility]   |
|                |                          |                   | LABORATORY   |
+----------------+--------------------------+-------------------+--------------+
| GLOBULIN       | 3.9                      | 1.3 - 4.9 g/dL    | [facility]   |
|                |                          |                   | LABORATORY   |
+----------------+--------------------------+-------------------+--------------+
| A/G            | 0.6 (L)                  | 1.0 - 2.4         | [facility]   |
|                |                          |                   | LABORATORY   |
+----------------+--------------------------+-------------------+--------------+
| TBIL           | 0.4                      | 0.1 - 1.5 mg/dL   | [facility]   |
|                |                          |                   | LABORATORY   |
+----------------+--------------------------+-------------------+--------------+
| ALK PHOS       | 82                       | 35 - 115 U/L      | [facility]   |
|                |                          |                   | LABORATORY   |
+----------------+--------------------------+-------------------+--------------+
| AST            | 16                       | 10 - 45 U/L       | [facility]   |
|                |                          |                   | LABORATORY   |
+----------------+--------------------------+-------------------+--------------+
| ALT            | 14                       | 10 - 65 U/L       | [facility]   |
|                |                          |                   | LABORATORY   |
+----------------+--------------------------+-------------------+--------------+
| EGFR           | 6 (L)Comment: GFR <60:   | >60 mL/min/1.73m2 | [facility]   |
|                | CHRONIC KIDNEY DISEASE,  |                   | LABORATORY   |
|                | IF FOUND OVER A 3 MONTH  |                   |              |
|                | PERIOD.GFR <15: KIDNEY   |                   |              |
|                | FAILURE.FOR       |                   |              |
|                | [employer], MULTIPLY THE  |                   |              |
|                | CALCULATED GFR BY        |                   |              |
|                | 1.210.This eGFR is       |                   |              |
|                | calculated using the     |                   |              |
|                | MDRD IDMS traceable      |                   |              |
|                | equation.Testing         |                   |              |
|                | performed at [facility], [address] |                   |              |
|                |  [facility],        |                   |              |
|                | [place]    [address]   |                   |              |
+----------------+--------------------------+-------------------+--------------+
 
 
 
+----------+
| Specimen |
+----------+
| Blood    |
+----------+
 
 
 
+---------------+-------------------------+---------------------+----------------+
 
| Performing    | Address                 | City/State/Zipcode  | Phone Number   |
| Organization  |                         |                     |                |
+---------------+-------------------------+---------------------+----------------+
|   [facility]  |   [address]  | [place] [address] |   [phone] |
| LABORATORY    | [address]                   |                     |                |
+---------------+-------------------------+---------------------+----------------+
 CBC W/Auto Diff (Reflex to Manual) (2018  5:50 AM)
 
+-----------------+--------------------------+-------------------+--------------+
| Component       | Value                    | Ref Range         | Performed At |
+-----------------+--------------------------+-------------------+--------------+
| WBC             | 5.62                     | 3.80 - 11.00 K/uL | [facility]   |
|                 |                          |                   | LABORATORY   |
+-----------------+--------------------------+-------------------+--------------+
| RBC             | 2.57 (L)                 | 3.70 - 5.10 M/uL  | [facility]   |
|                 |                          |                   | LABORATORY   |
+-----------------+--------------------------+-------------------+--------------+
| HGB             | 8.9 (L)                  | 11.3 - 15.5 g/dL  | [facility]   |
|                 |                          |                   | LABORATORY   |
+-----------------+--------------------------+-------------------+--------------+
| HCT             | 27.0 (L)                 | 34.0 - 46.0 %     | [facility]   |
|                 |                          |                   | LABORATORY   |
+-----------------+--------------------------+-------------------+--------------+
| MCV             | 105.1 (H)                | 80.0 - 100.0 fl   | [facility]   |
|                 |                          |                   | LABORATORY   |
+-----------------+--------------------------+-------------------+--------------+
| MCH             | 34.6 (H)                 | 27.0 - 34.0 pg    | [facility]   |
|                 |                          |                   | LABORATORY   |
+-----------------+--------------------------+-------------------+--------------+
| MCHC            | 32.9                     | 32.0 - 35.5 g/dL  | [facility]   |
|                 |                          |                   | LABORATORY   |
+-----------------+--------------------------+-------------------+--------------+
| RDW SD          | 63.0 (H)                 | 37 - 53 fl        | [facility]   |
|                 |                          |                   | LABORATORY   |
+-----------------+--------------------------+-------------------+--------------+
| PLT             | 133 (L)                  | 150 - 400 K/uL    | [facility]   |
|                 |                          |                   | LABORATORY   |
+-----------------+--------------------------+-------------------+--------------+
| MPV             | 8.4                      | fl                | [facility]   |
|                 |                          |                   | LABORATORY   |
+-----------------+--------------------------+-------------------+--------------+
| DIFF TYPE       | AUTOMATED                |                   | [facility]   |
|                 |                          |                   | LABORATORY   |
+-----------------+--------------------------+-------------------+--------------+
| NEUTROPHILS     | 76.00                    | %                 | [facility]   |
|                 |                          |                   | LABORATORY   |
+-----------------+--------------------------+-------------------+--------------+
| LYMPHOCYTES     | 13.76                    | %                 | [facility]   |
|                 |                          |                   | LABORATORY   |
+-----------------+--------------------------+-------------------+--------------+
| MONOCYTES       | 9.79                     | %                 | [facility]   |
|                 |                          |                   | LABORATORY   |
+-----------------+--------------------------+-------------------+--------------+
| EOSINOPHILS     | 0.03                     | %                 | [facility]   |
|                 |                          |                   | LABORATORY   |
+-----------------+--------------------------+-------------------+--------------+
| BASOPHILS       | 0.42                     | %                 | [facility]   |
|                 |                          |                   | LABORATORY   |
+-----------------+--------------------------+-------------------+--------------+
| NEUTROPHILS ABS | 4.27                     | 1.90 - 7.40 K/uL  | [facility]   |
 
|                 |                          |                   | LABORATORY   |
+-----------------+--------------------------+-------------------+--------------+
| LYMPHOCYTES ABS | 0.77 (L)                 | 1.00 - 3.90 K/uL  | [facility]   |
|                 |                          |                   | LABORATORY   |
+-----------------+--------------------------+-------------------+--------------+
| MONOCYTES ABS   | 0.55                     | 0.00 - 0.80 K/uL  | [facility]   |
|                 |                          |                   | LABORATORY   |
+-----------------+--------------------------+-------------------+--------------+
| EOSINOPHILS ABS | 0.00                     | 0.00 - 0.50 K/uL  | [facility]   |
|                 |                          |                   | LABORATORY   |
+-----------------+--------------------------+-------------------+--------------+
| BASOPHILS ABS   | 0.02Comment: Testing     | 0.00 - 0.10 K/uL  | [facility]   |
|                 | performed at [facility], [address] |                   | LABORATORY   |
|                 |  [address],        |                   |              |
|                 | [place]  [address]     |                   |              |
+-----------------+--------------------------+-------------------+--------------+
 
 
 
+----------+
| Specimen |
+----------+
| Blood    |
+----------+
 
 
 
+---------------+-------------------------+---------------------+----------------+
| Performing    | Address                 | City/State/Zipcode  | Phone Number   |
| Organization  |                         |                     |                |
+---------------+-------------------------+---------------------+----------------+
|   [facility]  |   [address]  | [place] [address] |   [phone] |
| LABORATORY    | [address]                   |                     |                |
+---------------+-------------------------+---------------------+----------------+
 POCT glucose (2018  5:09 AM)
 
+--------------------+--------------------------+---------------+-----------------+
| Component          | Value                    | Ref Range     | Performed At    |
+--------------------+--------------------------+---------------+-----------------+
| GLUCOSE,POC SCREEN | 125 (H)Comment: Testing  | 65 - 99 mg/dL | [facility] |
|                    | performed at [facility];[address]     |               |                 |
|                    | [address];[place]   |               |                 |
|                    | [address]                    |               |                 |
+--------------------+--------------------------+---------------+-----------------+
 
 
 
+-------------------+------------------+--------------------+--------------+
| Performing        | Address          | City/State/Zipcode | Phone Number |
| Organization      |                  |                    |              |
+-------------------+------------------+--------------------+--------------+
|   [facility] |   [address] | [facility] [place] [address] |              |
+-------------------+------------------+--------------------+--------------+
 POCT glucose (2018  9:07 PM)
 
+--------------------+--------------------------+---------------+-----------------+
| Component          | Value                    | Ref Range     | Performed At    |
+--------------------+--------------------------+---------------+-----------------+
| GLUCOSE,POC SCREEN | 164 (H)Comment: Testing  | 65 - 99 mg/dL | [facility] |
|                    | performed at [facility];[address]     |               |                 |
 
|                    | [address];[place]   |               |                 |
|                    | [address]                    |               |                 |
+--------------------+--------------------------+---------------+-----------------+
 
 
 
+-------------------+------------------+--------------------+--------------+
| Performing        | Address          | City/State/Zipcode | Phone Number |
| Organization      |                  |                    |              |
+-------------------+------------------+--------------------+--------------+
|   [facility] |   [address] | [place] [address] |              |
+-------------------+------------------+--------------------+--------------+
 POCT glucose (2018  4:31 PM)
 
+--------------------+--------------------------+---------------+-----------------+
| Component          | Value                    | Ref Range     | Performed At    |
+--------------------+--------------------------+---------------+-----------------+
| GLUCOSE,POC SCREEN | 277 (H)Comment: Testing  | 65 - 99 mg/dL | [facility] |
|                    | performed at [facility];[address]     |               |                 |
|                    | [address];[place]   |               |                 |
|                    | [address]                    |               |                 |
+--------------------+--------------------------+---------------+-----------------+
 
 
 
+-------------------+------------------+--------------------+--------------+
| Performing        | Address          | City/State/Zipcode | Phone Number |
| Organization      |                  |                    |              |
+-------------------+------------------+--------------------+--------------+
|   [facility] |   [address] | [place] [address] |              |
+-------------------+------------------+--------------------+--------------+
 POCT glucose (2018 11:14 AM)
 
+--------------------+--------------------------+---------------+-----------------+
| Component          | Value                    | Ref Range     | Performed At    |
+--------------------+--------------------------+---------------+-----------------+
| GLUCOSE,POC SCREEN | 235 (H)Comment: Testing  | 65 - 99 mg/dL | [facility] |
|                    | performed at [facility];[address]     |               |                 |
|                    | [address] [facility];[place], [place]   |               |                 |
|                    | [address]                    |               |                 |
+--------------------+--------------------------+---------------+-----------------+
 
 
 
+-------------------+------------------+--------------------+--------------+
| Performing        | Address          | City/State/Zipcode | Phone Number |
| Organization      |                  |                    |              |
+-------------------+------------------+--------------------+--------------+
|   [facility] |   [address] | [place] [address] |              |
+-------------------+------------------+--------------------+--------------+
 X-ray foot left (2018  9:54 AM)
 
+------------------------------------------------------------------------+--------------+
| Impressions                                                            | Performed At |
+------------------------------------------------------------------------+--------------+
|      Amputation of the left forefoot at the level of the proximal      |   [facility]     |
| metatarsals. Surgical cutaneous staples are present.     No            | RADIOLOGY    |
| radiographic evidence for osteomyelitis. Normal alignment of the       |              |
| hindfoot. Mild degeneration of the midfoot.     Electronically signed  |              |
| by Oleksandr Lemus MD on 2018 10:12 AM                               |              |
 
+------------------------------------------------------------------------+--------------+
 
 
 
+------------------------------------------------------------------------+--------------+
| Narrative                                                              | Performed At |
+------------------------------------------------------------------------+--------------+
|   CHERIE AMBROSELENE DEYSI  1950  XR FOOT LEFT  2018 9:54 AM     |   [facility]     |
|  INDICATION: Osteomyelitis of the left foot.     COMPARISON: November  | RADIOLOGY    |
| 2018     TECHNIQUE: Left foot series, 3 views, PA, oblique and     |              |
| lateral views                                                          |              |
+------------------------------------------------------------------------+--------------+
 
 
 
+-------------------------------------------------------------------------------------------
-----------------------+
| Procedure Note                                                                            
                       |
+-------------------------------------------------------------------------------------------
-----------------------+
|   Denny, Lauro Results In - 2018 10:17 AM PST  CHERIE CHIU DEYSI1950XR FOOT      
                       |
| LEFT2018 9:54 AMINDICATION: Osteomyelitis of the left foot.COMPARISON: TECHNIQUE: Left foot series, 3 views, PA, oblique and lateral                     
                       |
| viewsIMPRESSION:Amputation of the left forefoot at the level of the proximal              
                       |
| metatarsals. Surgical cutaneous staples are present.No radiographic evidence for          
                       |
| osteomyelitis. Normal alignment of the hindfoot. Mild degeneration of the                 
                       |
| midfoot.Electronically signed by Oleksandr Lemus MD on 2018 10:12 AM                    
                       |
|COMPARISON: 2018                                                              
                       |
|TECHNIQUE: Left foot series, 3 views, PA, oblique and lateral views                        
                       |
|IMPRESSION:                                                                                
                      |
|Amputation of the left forefoot at the level of the proximal metatarsals. Surgical cutaneou
s staples are present. |
|                                                                                           
                       |
|No radiographic evidence for osteomyelitis. Normal alignment of the hindfoot. Mild degenera
tion of the midfoot.   |
|                                                                                           
                       |
|Electronically signed by Oleksandr Lemus MD on 2018 10:12 AM                             
                       |
+-------------------------------------------------------------------------------------------
-----------------------+
 
 
 
 
+--------------------+------------------+--------------------+--------------+
| Performing         | Address          | City/State/Zipcode | Phone Number |
| Organization       |                  |                    |              |
+--------------------+------------------+--------------------+--------------+
|   [facility] |   [address] | [place] [address] |              |
+--------------------+------------------+--------------------+--------------+
 Pathology histology - tissue (2018  8:00 AM)
 
+----------+
| Specimen |
+----------+
| Tissue   |
+----------+
 
 
 
+------------------------------------------------------------------------+--------------+
| Narrative                                                              | Performed At |
+------------------------------------------------------------------------+--------------+
|   SPECIMEN(S): A LEFT FOREFOOT  SPECIMEN SOURCE:  A. LEFT FOREFOOT     |   [facility]     |
| CLINICAL HISTORY:  Evaluate for osteomyelitis, first metatarsal.       | PATHOLOGY    |
| FINAL PATHOLOGIC DIAGNOSIS:  Left forefoot, amputation:                |              |
| -    Necrotic ulcer over the proximal medial first toe as described    |              |
| below           -    Acute and chronic osteomyelitis present in the    |              |
| area of ulceration           -    Additional skin changes on the       |              |
| plantar surface as described below           -    Histologically       |              |
| viable soft tissue and bone resection margins  MICROSCOPIC             |              |
| EXAMINATION:  Histologic sections of all submitted blocks are examined |              |
|  by light microscopy.    These findings, together with the gross       |              |
| examination, support the pathologic diagnosis.  GROSS DESCRIPTION:     |              |
| The specimen, labeled "LC, left forefoot," is received in formalin and |              |
|  consists of 10.5 x 9.6 x 4.3 cm distal left foot.    All 5 digits are |              |
|  present with yellow thickened nails.    The first digit  displays an  |              |
| irregular lateral flexion. The first digit nail is discolored and      |              |
| irregular.    The skin surface is pale pink and focally nodular with a |              |
|  red 2.2 x 1.2 cm pink ulcerated lesion present on the  medial aspect  |              |
| of the proximal first toe.    This ulcerated lesion exposes a tan      |              |
| smooth bone articulating surface and is 1.7 cm from the closest black  |              |
| inked soft tissue resection margin.    Adjacent to the  ulcerated      |              |
| lesion is an area of yellow-white thickened skin that is a 2.5 x 2.1   |              |
| cm.    The plantar aspect of the foot, proximal to the third and       |              |
| fourth digits is an area of shaggy white soft skin  measuring 3.1 x    |              |
| 2.4 x 0.4 cm.    This area is a 1.1 cm from the soft tissue resection  |              |
| margin.    Representative sections are submitted in four               |              |
| cassettes.    Tissue is placed into decal stat prior to  processing.   |              |
| Cassette summary:  (A1) ulcerated lesion to underlying soft tissue and |              |
|  bone  (A2) ulcerated lesion to adjacent thickened skin and shaggy     |              |
| white skin on plantar surface  (A3) distal tip of first digit to       |              |
| underlying soft tissue and bone  (A4) first metatarsal bone resection  |              |
| margin, shave.  FB (under the direct supervision of a pathologist)     |              |
|  The Gross Description was prepared using a voice recognition          |              |
| system.    The report was reviewed for accuracy; however, sound-alike  |              |
| word errors, addition and/or deletions may occur.    If there is any   |              |
| question about this report, please contact Client Services.            |              |
| PERFORMING LABORATORY:  Professional interpretation was performed by   |              |
| [facility], [facility], [address]     |              |
| [place] [address] (Medical Director:    Srinivasan Rojas M.D.;      |              |
| CLIA#:    [id number]).  The technical component was performed by       |              |
 
| [facility], [address] [place] [id number] (Medical       |              |
| Director: Clementina Ojeda MD; CLIA# [id number]).  Diagnostician:    Srinivasan BHATIA |              |
|  Bob RODRIGES  Pathologist  Electronically Signed 2018              |              |
+------------------------------------------------------------------------+--------------+
 
 
 
+--------------------+---------+--------------------+--------------+
| Performing         | Address | City/State/Zipcode | Phone Number |
| Organization       |         |                    |              |
+--------------------+---------+--------------------+--------------+
|   [facility] |         |                    |              |
+--------------------+---------+--------------------+--------------+
 Phosphorus (2018  6:18 AM)
 
+------------+--------------------------+-----------------+-----------------+
| Component  | Value                    | Ref Range       | Performed At    |
+------------+--------------------------+-----------------+-----------------+
| PHOSPHORUS | 4.9 (H)Comment: Testing  | 2.3 - 4.8 mg/dL | [facility] |
|            | performed at [facility]     |                 |                 |
|            | [address];[place], [place]   |                 |                 |
|            | [id number]                    |                 |                 |
+------------+--------------------------+-----------------+-----------------+
 
 
 
+----------+
| Specimen |
+----------+
| Blood    |
+----------+
 
 
 
+-------------------+------------------+--------------------+--------------+
| Performing        | Address          | City/State/Zipcode | Phone Number |
| Organization      |                  |                    |              |
+-------------------+------------------+--------------------+--------------+
|   [facility] |   [address] | [place] [address] |              |
+-------------------+------------------+--------------------+--------------+
 CBC w/auto diff (reflex to manual) (2018  6:18 AM)
 
+-----------------+-------------------------+-------------------+-----------------+
| Component       | Value                   | Ref Range         | Performed At    |
+-----------------+-------------------------+-------------------+-----------------+
| WBC             | 5.94                    | 3.80 - 11.00 K/uL | [facility] |
+-----------------+-------------------------+-------------------+-----------------+
| RBC             | 2.73 (L)                | 3.70 - 5.10 M/uL  | [facility] |
+-----------------+-------------------------+-------------------+-----------------+
| HGB             | 9.6 (L)                 | 11.3 - 15.5 g/dL  | [facility] |
+-----------------+-------------------------+-------------------+-----------------+
| HCT             | 28.3 (L)                | 34.0 - 46.0 %     | [facility] |
+-----------------+-------------------------+-------------------+-----------------+
| MCV             | 103.5 (H)               | 80.0 - 100.0 fl   | [facility] |
+-----------------+-------------------------+-------------------+-----------------+
| MCH             | 35.0 (H)                | 27.0 - 34.0 pg    | [facility] |
+-----------------+-------------------------+-------------------+-----------------+
| MCHC            | 33.8                    | 32.0 - 35.5 g/dL  | [facility] |
+-----------------+-------------------------+-------------------+-----------------+
| RDW SD          | 63.4 (H)                | 37 - 53 fl        | [facility] |
 
+-----------------+-------------------------+-------------------+-----------------+
| PLT             | 151                     | 150 - 400 K/uL    | [facility] |
+-----------------+-------------------------+-------------------+-----------------+
| MPV             | 8.0                     | fl                | [facility] |
+-----------------+-------------------------+-------------------+-----------------+
| DIFF TYPE       | AUTOMATED               |                   | [facility] |
+-----------------+-------------------------+-------------------+-----------------+
| NEUTROPHILS     | 81.69                   | %                 | [facility] |
+-----------------+-------------------------+-------------------+-----------------+
| LYMPHOCYTES     | 8.42                    | %                 | [facility] |
+-----------------+-------------------------+-------------------+-----------------+
| MONOCYTES       | 9.63                    | %                 | [facility] |
+-----------------+-------------------------+-------------------+-----------------+
| EOSINOPHILS     | 0.00                    | %                 | [facility] |
+-----------------+-------------------------+-------------------+-----------------+
| BASOPHILS       | 0.26                    | %                 | [facility] |
+-----------------+-------------------------+-------------------+-----------------+
| NEUTROPHILS ABS | 4.86                    | 1.90 - 7.40 K/uL  | [facility] |
+-----------------+-------------------------+-------------------+-----------------+
| LYMPHOCYTES ABS | 0.50 (L)                | 1.00 - 3.90 K/uL  | [facility] |
+-----------------+-------------------------+-------------------+-----------------+
| MONOCYTES ABS   | 0.57                    | 0.00 - 0.80 K/uL  | [facility] |
+-----------------+-------------------------+-------------------+-----------------+
| EOSINOPHILS ABS | 0.00                    | 0.00 - 0.50 K/uL  | [facility] |
+-----------------+-------------------------+-------------------+-----------------+
| BASOPHILS ABS   | 0.02Comment: Testing    | 0.00 - 0.10 K/uL  | [facility] |
|                 | performed at [facility];[address]    |                   |                 |
|                 | [address];[place]  |                   |                 |
|                 | [id number]                   |                   |                 |
+-----------------+-------------------------+-------------------+-----------------+
 
 
 
+----------+
| Specimen |
+----------+
| Blood    |
+----------+
 
 
 
+-------------------+------------------+--------------------+--------------+
| Performing        | Address          | City/State/Zipcode | Phone Number |
| Organization      |                  |                    |              |
+-------------------+------------------+--------------------+--------------+
|   [facility] |   [address] | [place] [address] |              |
+-------------------+------------------+--------------------+--------------+
 Comprehensive metabolic panel (2018  6:18 AM)
 
+----------------+--------------------------+-------------------+-----------------+
| Component      | Value                    | Ref Range         | Performed At    |
+----------------+--------------------------+-------------------+-----------------+
| SODIUM         | 134 (L)                  | 135 - 145 mmol/L  | [facility] |
+----------------+--------------------------+-------------------+-----------------+
| POTASSIUM      | 4.8                      | 3.5 - 4.9 mmol/L  | [facility] |
+----------------+--------------------------+-------------------+-----------------+
| CHLORIDE       | 99                       | 99 - 109 mmol/L   | [facility] |
+----------------+--------------------------+-------------------+-----------------+
| CO2            | 26                       | 23 - 32 mmol/L    | [facility] |
+----------------+--------------------------+-------------------+-----------------+
 
| ANION GAP AGAP | 14                       | 5 - 20 mmol/L     | [facility] |
+----------------+--------------------------+-------------------+-----------------+
| GLUCOSE        | 222 (H)                  | 65 - 99 mg/dL     | [facility] |
+----------------+--------------------------+-------------------+-----------------+
| BUN            | 22                       | 8 - 25 mg/dL      | [facility] |
+----------------+--------------------------+-------------------+-----------------+
| CREATININE     | 4.6 (H)                  | 0.50 - 1.00 mg/dL | [facility] |
+----------------+--------------------------+-------------------+-----------------+
| BUN/CREAT      | 5                        |                   | [facility] |
+----------------+--------------------------+-------------------+-----------------+
| CALCIUM        | 8.5                      | 8.5 - 10.5 mg/dL  | [facility] |
+----------------+--------------------------+-------------------+-----------------+
| TOTAL PROTEIN  | 6.1 (L)                  | 6.3 - 8.2 g/dL    | [facility] |
+----------------+--------------------------+-------------------+-----------------+
| Albumin        | 2.4 (L)                  | 3.3 - 4.8 g/dL    | [facility] |
+----------------+--------------------------+-------------------+-----------------+
| GLOBULIN       | 3.7                      | 1.3 - 4.9 g/dL    | [facility] |
+----------------+--------------------------+-------------------+-----------------+
| A/G            | 0.7 (L)                  | 1.0 - 2.4         | [facility] |
+----------------+--------------------------+-------------------+-----------------+
| TBIL           | 0.4                      | 0.1 - 1.5 mg/dL   | [facility] |
+----------------+--------------------------+-------------------+-----------------+
| ALK PHOS       | 95                       | 35 - 115 U/L      | [facility] |
+----------------+--------------------------+-------------------+-----------------+
| AST            | 23                       | 10 - 45 U/L       | [facility] |
+----------------+--------------------------+-------------------+-----------------+
| ALT            | 16                       | 10 - 65 U/L       | [facility] |
+----------------+--------------------------+-------------------+-----------------+
| EGFR           | 9 (L)Comment: GFR <60:   | >60 mL/min/1.73m2 | [facility] |
|                | CHRONIC KIDNEY DISEASE,  |                   |                 |
|                | IF FOUND OVER A 3 MONTH  |                   |                 |
|                | PERIOD.GFR <15: KIDNEY   |                   |                 |
|                | FAILURE.FOR       |                   |                 |
|                | [employer], MULTIPLY THE  |                   |                 |
|                | CALCULATED GFR BY        |                   |                 |
|                | 1.210.This eGFR is       |                   |                 |
|                | calculated using the     |                   |                 |
|                | MDRD IDMS traceable      |                   |                 |
|                | equation.Testing         |                   |                 |
|                | performed at [facility];[address]     |                   |                 |
|                | [address];[place]   |                   |                 |
|                | [id number]                    |                   |                 |
+----------------+--------------------------+-------------------+-----------------+
 
 
 
+----------+
| Specimen |
+----------+
| Blood    |
+----------+
 
 
 
+-------------------+------------------+--------------------+--------------+
| Performing        | Address          | City/State/Zipcode | Phone Number |
| Organization      |                  |                    |              |
+-------------------+------------------+--------------------+--------------+
|   [facility] |   [address] | [place] [address] |              |
+-------------------+------------------+--------------------+--------------+
 
 POCT glucose (2018  5:39 AM)
 
+--------------------+--------------------------+---------------+-----------------+
| Component          | Value                    | Ref Range     | Performed At    |
+--------------------+--------------------------+---------------+-----------------+
| GLUCOSE,POC SCREEN | 219 (H)Comment: Testing  | 65 - 99 mg/dL | [facility] |
|                    | performed at [facility];[address]     |               |                 |
|                    | [address];[place]   |               |                 |
|                    | [id number]                    |               |                 |
+--------------------+--------------------------+---------------+-----------------+
 
 
 
+-------------------+------------------+--------------------+--------------+
| Performing        | Address          | City/State/Zipcode | Phone Number |
| Organization      |                  |                    |              |
+-------------------+------------------+--------------------+--------------+
|   [facility] |   [address] | [place] [address] |              |
+-------------------+------------------+--------------------+--------------+
 POCT glucose (2018  9:01 PM)
 
+--------------------+--------------------------+---------------+-----------------+
| Component          | Value                    | Ref Range     | Performed At    |
+--------------------+--------------------------+---------------+-----------------+
| GLUCOSE,POC SCREEN | 190 (H)Comment: Testing  | 65 - 99 mg/dL | [facility] |
|                    | performed at [facility];[address]     |               |                 |
|                    | [address];[place], [place]   |               |                 |
|                    | [id number]                    |               |                 |
+--------------------+--------------------------+---------------+-----------------+
 
 
 
+-------------------+------------------+--------------------+--------------+
| Performing        | Address          | City/State/Zipcode | Phone Number |
| Organization      |                  |                    |              |
+-------------------+------------------+--------------------+--------------+
|   [facility] |   [address] | [place] [address] |              |
+-------------------+------------------+--------------------+--------------+
 POCT glucose (2018  2:08 PM)
 
+--------------------+--------------------------+---------------+-----------------+
| Component          | Value                    | Ref Range     | Performed At    |
+--------------------+--------------------------+---------------+-----------------+
| GLUCOSE,POC SCREEN | 227 (H)Comment: Testing  | 65 - 99 mg/dL | [facility] |
|                    | performed at [facility];[address]     |               |                 |
|                    | [address];[place]   |               |                 |
|                    | [id number]                    |               |                 |
+--------------------+--------------------------+---------------+-----------------+
 
 
 
+-------------------+------------------+--------------------+--------------+
| Performing        | Address          | City/State/Zipcode | Phone Number |
| Organization      |                  |                    |              |
+-------------------+------------------+--------------------+--------------+
|   [facility] |   [address] | [place] [address] |              |
+-------------------+------------------+--------------------+--------------+
 Anaerobic Culture W/Gram Stain (2018  1:20 PM)
 
+----------------------+------------------------+-----------+-----------------+
 
| Component            | Value                  | Ref Range | Performed At    |
+----------------------+------------------------+-----------+-----------------+
| Specimen Description | WOUND                  |           | [facility]      |
|                      |                        |           | LABORATORY      |
+----------------------+------------------------+-----------+-----------------+
| SPECIAL REQUESTS     | FIRST METATARSAL LEFT  |           | [facility] |
|                      | FOOT                   |           |                 |
+----------------------+------------------------+-----------+-----------------+
| GRAM STAIN           | NO CELLS OR ORGANISMS  |           | [facility]      |
|                      | SEEN                   |           | LABORATORY      |
+----------------------+------------------------+-----------+-----------------+
| CULTURE              | 1+                     |           | [facility]      |
|                      |                        |           | LABORATORY      |
+----------------------+------------------------+-----------+-----------------+
| CULTURE              | NORMAL SKIN MARZENA      |           | [facility]      |
|                      | ISOLATED               |           | LABORATORY      |
+----------------------+------------------------+-----------+-----------------+
| CULTURE              | NO FURTHER WORKUP      |           | [facility]      |
|                      |                        |           | LABORATORY      |
+----------------------+------------------------+-----------+-----------------+
 
 
 
+----------+
| Specimen |
+----------+
| Wound    |
+----------+
 
 
 
+-------------------+-------------------------+---------------------+----------------+
| Performing        | Address                 | City/State/Zipcode  | Phone Number   |
| Organization      |                         |                     |                |
+-------------------+-------------------------+---------------------+----------------+
|   [facility]      |   [address]  | [place] [address] |   [phone] |
| LABORATORY        | [address]                   |                     |                |
+-------------------+-------------------------+---------------------+----------------+
|   [facility] |   [address]        | [place] [address]  |                |
+-------------------+-------------------------+---------------------+----------------+
 POCT glucose (2018 10:27 AM)
 
+--------------------+--------------------------+---------------+-----------------+
| Component          | Value                    | Ref Range     | Performed At    |
+--------------------+--------------------------+---------------+-----------------+
| GLUCOSE,POC SCREEN | 256 (H)Comment: Testing  | 65 - 99 mg/dL | [facility] |
|                    | performed at [facility];[address]     |               |                 |
|                    | [address];[place]   |               |                 |
|                    | [id number]                    |               |                 |
+--------------------+--------------------------+---------------+-----------------+
 
 
 
+-------------------+------------------+--------------------+--------------+
| Performing        | Address          | City/State/Zipcode | Phone Number |
| Organization      |                  |                    |              |
+-------------------+------------------+--------------------+--------------+
|   [facility] |   [address] | [place] [address] |              |
+-------------------+------------------+--------------------+--------------+
 in this encounter
 
 
 Visit Diagnoses
 
 
+------------------------------------------------------------------------------------------+
| Diagnosis                                                                                |
+------------------------------------------------------------------------------------------+
|   Osteomyelitis of left foot (HCC) - Primary                                             |
+------------------------------------------------------------------------------------------+
|   Unspecified osteomyelitis, ankle and foot                                              |
+------------------------------------------------------------------------------------------+
|   Acute osteomyelitis of left ankle or foot (HCC)                                        |
+------------------------------------------------------------------------------------------+
|   Acute osteomyelitis, ankle and foot                                                    |
+------------------------------------------------------------------------------------------+
|   Prophylactic antibiotic                                                                |
+------------------------------------------------------------------------------------------+
|   Encounter for long-term (current) use of antibiotics                                   |
+------------------------------------------------------------------------------------------+
|   Anemia in ESRD (end-stage renal disease) (HCC)                                         |
+------------------------------------------------------------------------------------------+
|   Anemia in chronic kidney disease                                                       |
+------------------------------------------------------------------------------------------+
|   CAD (coronary artery disease)                                                          |
+------------------------------------------------------------------------------------------+
|   Coronary atherosclerosis of unspecified type of vessel, native or graft                |
+------------------------------------------------------------------------------------------+
|   Chronic anticoagulation                                                                |
+------------------------------------------------------------------------------------------+
|   Encounter for long-term (current) use of anticoagulants                                |
+------------------------------------------------------------------------------------------+
|   Chronic systolic congestive heart failure (HCC)                                        |
+------------------------------------------------------------------------------------------+
|   Chronic systolic heart failure                                                         |
+------------------------------------------------------------------------------------------+
|   ESRD (end stage renal disease) (HCC)                                                   |
+------------------------------------------------------------------------------------------+
|   End stage renal disease                                                                |
+------------------------------------------------------------------------------------------+
|   Hypertension, essential                                                                |
+------------------------------------------------------------------------------------------+
|   Unspecified essential hypertension                                                     |
+------------------------------------------------------------------------------------------+
|   Diabetic foot ulcer (HCC)                                                              |
+------------------------------------------------------------------------------------------+
|   Type II or unspecified type diabetes mellitus with other specified manifestations, not |
|  stated as uncontrolled                                                                  |
+------------------------------------------------------------------------------------------+
|   Paroxysmal atrial fibrillation (HCC)                                                   |
+------------------------------------------------------------------------------------------+
|   Atrial fibrillation                                                                    |
+------------------------------------------------------------------------------------------+
|   Type 2 diabetes mellitus with chronic kidney disease on chronic dialysis, with         |
| long-term current use of insulin (HCC)                                                   |
+------------------------------------------------------------------------------------------+
|   PVD (peripheral vascular disease) ([facility])                                                |
+------------------------------------------------------------------------------------------+
|   Peripheral vascular disease, unspecified                                               |
+------------------------------------------------------------------------------------------+
|   Depression                                                                             |
 
+------------------------------------------------------------------------------------------+
|   Depressive disorder, not elsewhere classified                                          |
+------------------------------------------------------------------------------------------+
|   Cardiomyopathy (HCC)                                                                   |
+------------------------------------------------------------------------------------------+
|   Other primary cardiomyopathies                                                         |
+------------------------------------------------------------------------------------------+
|   S/P mitral valve repair                                                                |
+------------------------------------------------------------------------------------------+
|   Other postprocedural status                                                            |
+------------------------------------------------------------------------------------------+
|   S/P CABG x 2                                                                           |
+------------------------------------------------------------------------------------------+
|   Postsurgical aortocoronary bypass status                                               |
+------------------------------------------------------------------------------------------+
|   Secondary hyperparathyroidism (HCC)                                                    |
+------------------------------------------------------------------------------------------+
|   Secondary hyperparathyroidism (of renal origin)                                        |
+------------------------------------------------------------------------------------------+
 
 
 
 Admitting Diagnoses
 
 
+--------------------------------------------------------+
| Diagnosis                                              |
+--------------------------------------------------------+
|   Prophylactic antibiotic                              |
+--------------------------------------------------------+
|   Encounter for long-term (current) use of antibiotics |
+--------------------------------------------------------+
|   Anemia in ESRD (end-stage renal disease) ([facility])       |
+--------------------------------------------------------+
|   Anemia in chronic kidney disease                     |
+--------------------------------------------------------+
|   Acute osteomyelitis of left ankle or foot ([facility])      |
+--------------------------------------------------------+
|   Acute osteomyelitis, ankle and foot                  |
+--------------------------------------------------------+
 
 
 
 Administered Medications
 
 
+------------------+--------+---------+------+------+------+
| Medication Order | MAR    | Action  | Dose | Rate | Site |
|                  | Action | Date    |      |      |      |
+------------------+--------+---------+------+------+------+
 
 
 
+-----------------------------------+---+
|   acetaminophen (TYLENOL)         |   |
| suppository 650 mg  650 mg,       |   |
| Rectal, Every 6 Hours PRN, Mild   |   |
| Pain (1-3), Fever, Starting Thu   |   |
| 18 at 1538                  |   |
+-----------------------------------+---+
 
|                                   |   |
+-----------------------------------+---+
|   acetaminophen (TYLENOL) tablet  |   |
| 650 mg  650 mg, Oral, Every 6     |   |
| Hours PRN, Mild Pain (1-3),       |   |
| Fever, Starting Thu 18 at   |   |
| 1538                              |   |
+-----------------------------------+---+
|                                   |   |
+-----------------------------------+---+
 
 
 
+-----------------------------------+---------+----------+--------+---+-------+
|   albumin human 25 % solution     | New Bag | 20 | 12.5 g |   | Other |
| 12.5 g  12.5 g, Intravenous,      |         | 18 09:10 |        |   |       |
| Every 1 Hour PRN, Other, for      |         |  PST     |        |   |       |
| cramps or hypotension during      |         |          |        |   |       |
| dialysis., Starting Sat 18  |         |          |        |   |       |
| at 0759, DIALYSIS                 |         |          |        |   |       |
+-----------------------------------+---------+----------+--------+---+-------+
 
 
 
+---------+----------+--------+---+-------+
| New Bag | 20 | 12.5 g |   | Other |
|         | 18 10:41 |        |   |       |
|         |  PST     |        |   |       |
+---------+----------+--------+---+-------+
| New Bag | 2019 | 12.5 g |   |       |
|         |  15:45   |        |   |       |
|         | PST      |        |   |       |
+---------+----------+--------+---+-------+
 
 
 
+---+---+
|   |   |
+---+---+
 
 
 
+-----------------------------------+-------+----------+--------+---+---+
|   apixaban (ELIQUIS) tablet 2.5   | Given | 1/3/2019 | 2.5 mg |   |   |
| mg  2.5 mg, Oral, 2 Times Daily,  |       |  08:31   |        |   |   |
| First dose on Thu 18 at     |       | PST      |        |   |   |
| 2100                              |       |          |        |   |   |
+-----------------------------------+-------+----------+--------+---+---+
 
 
 
+-------+----------+--------+---+---+
| Given | 1/3/2019 | 2.5 mg |   |   |
|       |  20:35   |        |   |   |
|       | PST      |        |   |   |
+-------+----------+--------+---+---+
| Given | 2019 | 2.5 mg |   |   |
|       |  08:11   |        |   |   |
|       | PST      |        |   |   |
+-------+----------+--------+---+---+
 
 
 
 
+---+---+
|   |   |
+---+---+
 
 
 
+-----------------------------------+-------+----------+-------+---+---+
|   aspirin EC tablet 81 mg  81 mg, | Given | 2019 | 81 mg |   |   |
|  Oral, Daily With Breakfast,      |       |  07:42   |       |   |   |
| First dose on Thu 18 at     |       | PST      |       |   |   |
| 1600                              |       |          |       |   |   |
+-----------------------------------+-------+----------+-------+---+---+
 
 
 
+-------+----------+-------+---+---+
| Given | 1/3/2019 | 81 mg |   |   |
|       |  08:37   |       |   |   |
|       | PST      |       |   |   |
+-------+----------+-------+---+---+
| Given | 2019 | 81 mg |   |   |
|       |  08:11   |       |   |   |
|       | PST      |       |   |   |
+-------+----------+-------+---+---+
 
 
 
+---+---+
|   |   |
+---+---+
 
 
 
+-----------------------------------+-------+----------+--------+---+---+
|   aspirin tablet 325 mg  325 mg,  | Given | 2019 | 325 mg |   |   |
| Oral, Once, Tue 19 at 1800,   |       |  18:22   |        |   |   |
| For 1 dose                        |       | PST      |        |   |   |
+-----------------------------------+-------+----------+--------+---+---+
 
 
 
+---+---+
|   |   |
+---+---+
 
 
 
+----------------------------------+-------+----------+-------+---+---+
|   atorvastatin (LIPITOR) tablet  | Given | 20 | 20 mg |   |   |
| 20 mg  20 mg, Oral, Nightly,     |       | 18 20:23 |       |   |   |
| First dose on Thu 18 at    |       |  PST     |       |   |   |
| 2200                             |       |          |       |   |   |
+----------------------------------+-------+----------+-------+---+---+
 
 
 
+-------+----------+-------+---+---+
 
| Given | 20 | 20 mg |   |   |
|       | 18 21:12 |       |   |   |
|       |  PST     |       |   |   |
+-------+----------+-------+---+---+
| Given | 2019 | 20 mg |   |   |
|       |  22:00   |       |   |   |
|       | PST      |       |   |   |
+-------+----------+-------+---+---+
 
 
 
+---+---+
|   |   |
+---+---+
 
 
 
+----------------------------------+-------+----------+-------+---+---+
|   atorvastatin (LIPITOR) tablet  | Given | 2019 | 40 mg |   |   |
| 40 mg  40 mg, Oral, Nightly,     |       |  21:43   |       |   |   |
| First dose on 19 at 2200 |       | PST      |       |   |   |
+----------------------------------+-------+----------+-------+---+---+
 
 
 
+-------+----------+-------+---+---+
| Given | 1/3/2019 | 40 mg |   |   |
|       |  21:10   |       |   |   |
|       | PST      |       |   |   |
+-------+----------+-------+---+---+
 
 
 
+-----------------------------------+---+
|                                   |   |
+-----------------------------------+---+
|   bisacodyl (DULCOLAX)            |   |
| suppository 10 mg  10 mg, Rectal, |   |
|  Daily PRN, Constipation,         |   |
| Starting Fri 19 at 0800       |   |
+-----------------------------------+---+
|                                   |   |
+-----------------------------------+---+
 
 
 
+----------------------------------+-------+----------+--------+---+---+
|   calcium acetate (PHOSLO)       | Given | 1/3/2019 | 667 mg |   |   |
| capsule 667 mg  667 mg, Oral, 3  |       |  11:10   |        |   |   |
| Times Daily With Meals, First    |       | PST      |        |   |   |
| dose on Thu 18 at 1700     |       |          |        |   |   |
+----------------------------------+-------+----------+--------+---+---+
 
 
 
+-------+----------+--------+---+---+
| Given | 1/3/2019 | 667 mg |   |   |
|       |  16:06   |        |   |   |
|       | PST      |        |   |   |
+-------+----------+--------+---+---+
 
| Given | 2019 | 667 mg |   |   |
|       |  08:11   |        |   |   |
|       | PST      |        |   |   |
+-------+----------+--------+---+---+
 
 
 
+---+---+
|   |   |
+---+---+
 
 
 
+-----------------------------------+-------+----------+----------+---+---+
|   carvedilol (COREG) tablet 3.125 | Given | 1/3/2019 | 3.125 mg |   |   |
|  mg  3.125 mg, Oral, 2 Times      |       |  08:37   |          |   |   |
| Daily With Meals, First dose on   |       | PST      |          |   |   |
| Thu 18 at 1700              |       |          |          |   |   |
+-----------------------------------+-------+----------+----------+---+---+
 
 
 
+-------+----------+----------+---+---+
| Given | 1/3/2019 | 3.125 mg |   |   |
|       |  16:06   |          |   |   |
|       | PST      |          |   |   |
+-------+----------+----------+---+---+
| Given | 2019 | 3.125 mg |   |   |
|       |  08:11   |          |   |   |
|       | PST      |          |   |   |
+-------+----------+----------+---+---+
 
 
 
+---+---+
|   |   |
+---+---+
 
 
 
+-----------------------------------+-------+----------+-----+-------+---+
|   ceFEPIme (MAXIPIME) IVPB 2 g  2 | Given | 20 | 2 g | 100   |   |
|  g, Intravenous, Administer over  |       | 18 22:43 |     | mL/hr |   |
| 30 Minutes, Every 48 Hours, First |       |  PST     |     |       |   |
|  dose on Thu 18 at 2030, On |       |          |     |       |   |
|  dialysis days, administer after  |       |          |     |       |   |
| dialysis.                         |       |          |     |       |   |
+-----------------------------------+-------+----------+-----+-------+---+
 
 
 
+-------+----------+-----+-------+---+
| Given | 20 | 2 g | 100   |   |
|       | 18 20:30 |     | mL/hr |   |
|       |  PST     |     |       |   |
+-------+----------+-----+-------+---+
 
 
 
+---+---+
 
|   |   |
+---+---+
 
 
 
+-----------------------------------+-------+----------+-----+-------+---+
|   cefTAZidime (FORTAZ) 2 g in     | Given | 1/3/2019 | 2 g | 100   |   |
| sodium chloride (IV) 0.9 % 50 mL  |       |  20:35   |     | mL/hr |   |
| IVPB  2 g, Intravenous,           |       | PST      |     |       |   |
| Administer over 30 Minutes, After |       |          |     |       |   |
|  Hemodialysis (see admin          |       |          |     |       |   |
| instructions), Starting Tue       |       |          |     |       |   |
| 19 at 0000, Ceftazidime 2 gm  |       |          |     |       |   |
| iv after each hemodialysis        |       |          |     |       |   |
+-----------------------------------+-------+----------+-----+-------+---+
 
 
 
+---+---+
|   |   |
+---+---+
 
 
 
+-----------------------------------+-------+----------+-----+-------+---+
|   cefTAZidime (FORTAZ) 2 g in     | Given | 2019 | 2 g | 100   |   |
| sodium chloride (IV) 0.9 % 50 mL  |       |  18:27   |     | mL/hr |   |
| IVPB  2 g, Intravenous,           |       | PST      |     |       |   |
| Administer over 30 Minutes, Once, |       |          |     |       |   |
|  Tue 19 at 1600, For 1 dose,  |       |          |     |       |   |
| Ceftazidime 2 gm iv after each    |       |          |     |       |   |
| hemodialysis                      |       |          |     |       |   |
+-----------------------------------+-------+----------+-----+-------+---+
 
 
 
+---+---+
|   |   |
+---+---+
 
 
 
+-----------------------------------+-------+----------+-----+-------+---+
|   cefTAZidime (FORTAZ) 2 g in     | Given | 2019 | 2 g | 100   |   |
| sodium chloride (IV) 0.9 % 50 mL  |       |  16:08   |     | mL/hr |   |
| IVPB  2 g, Intravenous,           |       | PST      |     |       |   |
| Administer over 30 Minutes, Once, |       |          |     |       |   |
|  Wed 19 at 1500, For 1 dose,  |       |          |     |       |   |
| Give after dialysis               |       |          |     |       |   |
+-----------------------------------+-------+----------+-----+-------+---+
 
 
 
+---+---+
|   |   |
+---+---+
 
 
 
+-----------------------------------+-------+----------+-------+---+---+
 
|   clopidogrel (PLAVIX) tablet 75  | Given | 2019 | 75 mg |   |   |
| mg  75 mg, Oral, Daily, First     |       |  07:43   |       |   |   |
| dose on Thu 18 at 1600      |       | PST      |       |   |   |
+-----------------------------------+-------+----------+-------+---+---+
 
 
 
+-------+----------+-------+---+---+
| Given | 1/3/2019 | 75 mg |   |   |
|       |  08:31   |       |   |   |
|       | PST      |       |   |   |
+-------+----------+-------+---+---+
| Given | 2019 | 75 mg |   |   |
|       |  08:11   |       |   |   |
|       | PST      |       |   |   |
+-------+----------+-------+---+---+
 
 
 
+---+---+
|   |   |
+---+---+
 
 
 
+----------------------------------+-------+----------+---------+---+-------+
|   epoetin byron (PROCRIT)         | Given | 20 | 10,000  |   | Other |
| injection 10,000 Units  10,000   |       | 18 10:17 | Units   |   |       |
| Units, Intravenous, Once In      |       |  PST     |         |   |       |
| Dialysis, Sat 18 at 0830,  |       |          |         |   |       |
| For 1 dose, DIALYSIS             |       |          |         |   |       |
+----------------------------------+-------+----------+---------+---+-------+
 
 
 
+---+---+
|   |   |
+---+---+
 
 
 
+-----------------------------------+-------+----------+---------+---+---+
|   epoetin byron (PROCRIT)          | Given | 2019 | 10,000  |   |   |
| injection 10,000 Units  10,000    |       |  14:45   | Units   |   |   |
| Units, Intravenous, Once In       |       | PST      |         |   |   |
| Dialysis, Tue 19 at 1430, For |       |          |         |   |   |
|  1 dose, DIALYSIS                 |       |          |         |   |   |
+-----------------------------------+-------+----------+---------+---+---+
 
 
 
+---+---+
|   |   |
+---+---+
 
 
 
+-----------------------------------+-------+----------+---------+---+---+
|   epoetin byron (PROCRIT)          | Given | 1/3/2019 | 10,000  |   |   |
| injection 10,000 Units  10,000    |       |  18:30   | Units   |   |   |
 
| Units, Intravenous, Once In       |       | PST      |         |   |   |
| Dialysis, Thu 1/3/19 at 1730, For |       |          |         |   |   |
|  1 dose, DIALYSIS                 |       |          |         |   |   |
+-----------------------------------+-------+----------+---------+---+---+
 
 
 
+---+---+
|   |   |
+---+---+
 
 
 
+-----------------------------------+-------+----------+--------+---+---+
|   fentaNYL (SUBLIMAZE) injection  | Given | 20 | 50 mcg |   |   |
| 50 mcg  50 mcg, Intravenous,      |       | 18 15:03 |        |   |   |
| Every 5 Min PRN, Pain, Option One |       |  PST     |        |   |   |
|  for pain scale 5-10/10. If no    |       |          |        |   |   |
| relief, proceed to option 2.,     |       |          |        |   |   |
| Starting Thu 18 at 1347,    |       |          |        |   |   |
| PACU                              |       |          |        |   |   |
+-----------------------------------+-------+----------+--------+---+---+
 
 
 
+---+---+
|   |   |
+---+---+
 
 
 
+-----------------------------------+-------+----------+-------+---+---+
|   furosemide (LASIX) tablet 20 mg | Given | 2019 | 20 mg |   |   |
|   20 mg, Oral, Daily, First dose  |       |  07:44   |       |   |   |
| on u 18 at 1600           |       | PST      |       |   |   |
+-----------------------------------+-------+----------+-------+---+---+
 
 
 
+-------+----------+-------+---+---+
| Given | 1/3/2019 | 20 mg |   |   |
|       |  08:31   |       |   |   |
|       | PST      |       |   |   |
+-------+----------+-------+---+---+
| Given | 2019 | 20 mg |   |   |
|       |  08:11   |       |   |   |
|       | PST      |       |   |   |
+-------+----------+-------+---+---+
 
 
 
+---+---+
|   |   |
+---+---+
 
 
 
+----------------------------------+-------+----------+----------+---+---+
|   HYDROcodone-acetaminophen      | Given | 20 | 1 tablet |   |   |
| (NORCO)  MG per tablet 1   |       | 18 07:21 |          |   |   |
 
| tablet  1 tablet, Oral, Every 4  |       |  PST     |          |   |   |
| Hours PRN, Severe Pain (7-10),   |       |          |          |   |   |
| Starting Thu 18 at 1844    |       |          |          |   |   |
+----------------------------------+-------+----------+----------+---+---+
 
 
 
+-------+----------+----------+---+---+
| Given | 20 | 1 tablet |   |   |
|       | 18 14:46 |          |   |   |
|       |  PST     |          |   |   |
+-------+----------+----------+---+---+
| Given | 1/3/2019 | 1 tablet |   |   |
|       |  10:50   |          |   |   |
|       | PST      |          |   |   |
+-------+----------+----------+---+---+
 
 
 
+---+---+
|   |   |
+---+---+
 
 
 
+----------------------------------+-------+----------+----------+---+---+
|   HYDROcodone-acetaminophen      | Given | 1/3/2019 | 1 tablet |   |   |
| (NORCO) 5-325 MG per tablet 1    |       |  14:19   |          |   |   |
| tablet  1 tablet, Oral, Every 4  |       | PST      |          |   |   |
| Hours PRN, Moderate Pain (4-6),  |       |          |          |   |   |
| Starting Thu 18 at 1844    |       |          |          |   |   |
+----------------------------------+-------+----------+----------+---+---+
 
 
 
+-------+----------+----------+---+---+
| Given | 1/3/2019 | 1 tablet |   |   |
|       |  21:30   |          |   |   |
|       | PST      |          |   |   |
+-------+----------+----------+---+---+
| Given | 2019 | 1 tablet |   |   |
|       |  08:12   |          |   |   |
|       | PST      |          |   |   |
+-------+----------+----------+---+---+
 
 
 
+---+---+
|   |   |
+---+---+
 
 
 
+-----------------------------------+-------+----------+----------+---+---+
|   insulin glargine (LANTUS)       | Given | 2019 | 20 Units |   |   |
| injection 20 Units  20 Units,     |       |  22:00   |          |   |   |
| Subcutaneous, Nightly, First dose |       | PST      |          |   |   |
|  on Thu 18 at 2200          |       |          |          |   |   |
+-----------------------------------+-------+----------+----------+---+---+
 
 
 
 
+-------+----------+----------+---+---+
| Given | 2019 | 20 Units |   |   |
|       |  21:44   |          |   |   |
|       | PST      |          |   |   |
+-------+----------+----------+---+---+
| Given | 1/3/2019 | 20 Units |   |   |
|       |  21:29   |          |   |   |
|       | PST      |          |   |   |
+-------+----------+----------+---+---+
 
 
 
+---+---+
|   |   |
+---+---+
 
 
 
+-----------------------------------+-------+----------+---------+---+---+
|   insulin lispro (human)          | Given | 2019 | 2 Units |   |   |
| (HUMALOG) injection 0-10 Units    |       |  12:03   |         |   |   |
| 0-10 Units, Subcutaneous, 3 Times |       | PST      |         |   |   |
|  Daily Before Meals, First dose   |       |          |         |   |   |
| on Thu 18 at 1630           |       |          |         |   |   |
+-----------------------------------+-------+----------+---------+---+---+
 
 
 
+----------------+----------+---------+---+---+
| Given          | 2019 | 2 Units |   |   |
|                |  16:45   |         |   |   |
|                | PST      |         |   |   |
+----------------+----------+---------+---+---+
| Given by Other | 1/3/2019 | 2 Units |   |   |
|                |  16:10   |         |   |   |
|                | PST      |         |   |   |
+----------------+----------+---------+---+---+
 
 
 
+---+---+
|   |   |
+---+---+
 
 
 
+-----------------------------------+-------+----------+---------+---+---+
|   insulin lispro (human)          | Given | 20 | 1 Units |   |   |
| (HUMALOG) injection 0-5 Units     |       | 18 21:16 |         |   |   |
| 0-5 Units, Subcutaneous, Nightly, |       |  PST     |         |   |   |
|  First dose on Thu 18 at    |       |          |         |   |   |
| 2200                              |       |          |         |   |   |
+-----------------------------------+-------+----------+---------+---+---+
 
 
 
+-------+----------+---------+---+---+
| Given | 2019 | 1 Units |   |   |
 
|       |  21:44   |         |   |   |
|       | PST      |         |   |   |
+-------+----------+---------+---+---+
 
 
 
+---+---+
|   |   |
+---+---+
 
 
 
+-----------------------------------+-------+----------+-------+---+---+
|   isosorbide mononitrate (IMDUR)  | Given | 2019 | 60 mg |   |   |
| 24 hr tablet 60 mg  60 mg, Oral,  |       |  07:43   |       |   |   |
| Daily, First dose on Thu 18 |       | PST      |       |   |   |
|  at 1600                          |       |          |       |   |   |
+-----------------------------------+-------+----------+-------+---+---+
 
 
 
+-------+----------+-------+---+---+
| Given | 1/3/2019 | 60 mg |   |   |
|       |  08:30   |       |   |   |
|       | PST      |       |   |   |
+-------+----------+-------+---+---+
| Given | 2019 | 60 mg |   |   |
|       |  08:11   |       |   |   |
|       | PST      |       |   |   |
+-------+----------+-------+---+---+
 
 
 
+---+---+
|   |   |
+---+---+
 
 
 
+-----------------------------------+-------+----------+------+---+---+
|   LORazepam (ATIVAN) tablet 1 mg  | Given | 1/3/2019 | 1 mg |   |   |
|  1 mg, Oral, Daily PRN, Anxiety,  |       |  00:55   |      |   |   |
| Starting Thu 18 at 1538     |       | PST      |      |   |   |
+-----------------------------------+-------+----------+------+---+---+
 
 
 
+----------------+----------+------+---+---+
| Given by Other | 1/3/2019 | 1 mg |   |   |
|                |  16:25   |      |   |   |
|                | PST      |      |   |   |
+----------------+----------+------+---+---+
| Given          | 2019 | 1 mg |   |   |
|                |  10:58   |      |   |   |
|                | PST      |      |   |   |
+----------------+----------+------+---+---+
 
 
 
+---+---+
 
|   |   |
+---+---+
 
 
 
+-----------------------------------+-------+----------+--------+---+---+
|   losartan (COZAAR) tablet 100 mg | Given | 2019 | 100 mg |   |   |
|   100 mg, Oral, Daily, First dose |       |  07:42   |        |   |   |
|  on Thu 18 at 1630          |       | PST      |        |   |   |
+-----------------------------------+-------+----------+--------+---+---+
 
 
 
+-------+----------+--------+---+---+
| Given | 1/3/2019 | 100 mg |   |   |
|       |  08:31   |        |   |   |
|       | PST      |        |   |   |
+-------+----------+--------+---+---+
| Given | 2019 | 100 mg |   |   |
|       |  08:11   |        |   |   |
|       | PST      |        |   |   |
+-------+----------+--------+---+---+
 
 
 
+---+---+
|   |   |
+---+---+
 
 
 
+-----------------------------------+-------+----------+--------+---+---+
|   nitroGLYCERIN (NITROSTAT) SL    | Given | 2019 | 0.4 mg |   |   |
| tablet 0.4 mg  0.4 mg,            |       |  17:34   |        |   |   |
| Sublingual, Every 5 Min PRN,      |       | PST      |        |   |   |
| Chest pain, Starting Thu 18 |       |          |        |   |   |
|  at 1538                          |       |          |        |   |   |
+-----------------------------------+-------+----------+--------+---+---+
 
 
 
+----------------+----------+--------+---+---+
| Given          | 1/3/2019 | 0.4 mg |   |   |
|                |  11:26   |        |   |   |
|                | PST      |        |   |   |
+----------------+----------+--------+---+---+
| Given by Other | 2019 | 0.4 mg |   |   |
|                |  05:28   |        |   |   |
|                | PST      |        |   |   |
+----------------+----------+--------+---+---+
 
 
 
+---+---+
|   |   |
+---+---+
 
 
 
+-----------------------------------+-------+----------+-------+---+---+
 
|   nortriptyline (PAMELOR) capsule | Given | 2019 | 25 mg |   |   |
|  25 mg  25 mg, Oral, Daily, First |       |  07:43   |       |   |   |
|  dose on Thu 18 at 1630     |       | PST      |       |   |   |
+-----------------------------------+-------+----------+-------+---+---+
 
 
 
+-------+----------+-------+---+---+
| Given | 1/3/2019 | 25 mg |   |   |
|       |  08:30   |       |   |   |
|       | PST      |       |   |   |
+-------+----------+-------+---+---+
| Given | 2019 | 25 mg |   |   |
|       |  08:11   |       |   |   |
|       | PST      |       |   |   |
+-------+----------+-------+---+---+
 
 
 
+---+---+
|   |   |
+---+---+
 
 
 
+-----------------------------------+-------+----------+-------+---+---+
|   nortriptyline (PAMELOR) capsule | Given | 2019 | 50 mg |   |   |
|  50 mg  50 mg, Oral, Nightly,     |       |  22:02   |       |   |   |
| First dose on Tue 19 at 2200  |       | PST      |       |   |   |
+-----------------------------------+-------+----------+-------+---+---+
 
 
 
+-------+----------+-------+---+---+
| Given | 2019 | 50 mg |   |   |
|       |  21:44   |       |   |   |
|       | PST      |       |   |   |
+-------+----------+-------+---+---+
| Given | 1/3/2019 | 50 mg |   |   |
|       |  21:10   |       |   |   |
|       | PST      |       |   |   |
+-------+----------+-------+---+---+
 
 
 
+---+---+
|   |   |
+---+---+
 
 
 
+-----------------------------------+-------+----------+-------+---+---+
|   nortriptyline (PAMELOR) capsule | Given | 20 | 75 mg |   |   |
|  75 mg  75 mg, Oral, Nightly,     |       | 18 20:30 |       |   |   |
| First dose on Thu 18 at     |       |  PST     |       |   |   |
| 2200                              |       |          |       |   |   |
+-----------------------------------+-------+----------+-------+---+---+
 
 
 
 
+-------+----------+-------+---+---+
| Given | 20 | 75 mg |   |   |
|       | 18 20:23 |       |   |   |
|       |  PST     |       |   |   |
+-------+----------+-------+---+---+
| Given | 20 | 75 mg |   |   |
|       | 18 21:12 |       |   |   |
|       |  PST     |       |   |   |
+-------+----------+-------+---+---+
 
 
 
+---+---+
|   |   |
+---+---+
 
 
 
+-----------------------------------+-------+----------+------+---+---+
|   ondansetron (ZOFRAN) injection  | Given | 1/3/2019 | 4 mg |   |   |
| 4 mg  4 mg, Intravenous, Every 6  |       |  06:38   |      |   |   |
| Hours PRN, Nausea, Vomiting,      |       | PST      |      |   |   |
| Starting Thu 18 at 1538     |       |          |      |   |   |
+-----------------------------------+-------+----------+------+---+---+
 
 
 
+-------+----------+------+---+---+
| Given | 1/3/2019 | 4 mg |   |   |
|       |  22:57   |      |   |   |
|       | PST      |      |   |   |
+-------+----------+------+---+---+
 
 
 
+---+---+
|   |   |
+---+---+
 
 
 
+-----------------------------------+-------+----------+------+---+---+
|   ondansetron (ZOFRAN-ODT)        | Given | 2019 | 4 mg |   |   |
| disintegrating tablet 4 mg  4 mg, |       |  14:21   |      |   |   |
|  Oral, Every 6 Hours PRN, Nausea, |       | PST      |      |   |   |
|  Vomiting, Starting Thu 18  |       |          |      |   |   |
| at 1538                           |       |          |      |   |   |
+-----------------------------------+-------+----------+------+---+---+
 
 
 
+-------+----------+------+---+---+
| Given | 2019 | 4 mg |   |   |
|       |  06:23   |      |   |   |
|       | PST      |      |   |   |
+-------+----------+------+---+---+
| Given | 2019 | 4 mg |   |   |
|       |  08:39   |      |   |   |
|       | PST      |      |   |   |
+-------+----------+------+---+---+
 
 
 
 
+---+---+
|   |   |
+---+---+
 
 
 
+-----------------------------------+-------+----------+--------+---+---+
|   oxybutynin (DITROPAN) tablet    | Given | 1/3/2019 | 2.5 mg |   |   |
| 2.5 mg  2.5 mg, Oral, 2 Times     |       |  08:31   |        |   |   |
| Daily, First dose on Thu 18 |       | PST      |        |   |   |
|  at 2100                          |       |          |        |   |   |
+-----------------------------------+-------+----------+--------+---+---+
 
 
 
+-------+----------+--------+---+---+
| Given | 1/3/2019 | 2.5 mg |   |   |
|       |  20:35   |        |   |   |
|       | PST      |        |   |   |
+-------+----------+--------+---+---+
| Given | 2019 | 2.5 mg |   |   |
|       |  08:11   |        |   |   |
|       | PST      |        |   |   |
+-------+----------+--------+---+---+
 
 
 
+---+---+
|   |   |
+---+---+
 
 
 
+-----------------------------------+-------+----------+-------+---+---+
|   pantoprazole (PROTONIX) EC      | Given | 2019 | 40 mg |   |   |
| tablet 40 mg  40 mg, Oral, Every  |       |  05:48   |       |   |   |
| Morning Before Breakfast, First   |       | PST      |       |   |   |
| dose on 18 at 0630      |       |          |       |   |   |
+-----------------------------------+-------+----------+-------+---+---+
 
 
 
+-------+----------+-------+---+---+
| Given | 1/3/2019 | 40 mg |   |   |
|       |  06:05   |       |   |   |
|       | PST      |       |   |   |
+-------+----------+-------+---+---+
| Given | 2019 | 40 mg |   |   |
|       |  05:41   |       |   |   |
|       | PST      |       |   |   |
+-------+----------+-------+---+---+
 
 
 
+---+---+
|   |   |
+---+---+
 
 
 
 
+-----------------------------------+-------+----------+------+---+---+
|   polyethylene glycol (GLYCOLAX)  | Given | 1/3/2019 | 17 g |   |   |
| packet 17 g  17 g, Oral, Daily    |       |  08:37   |      |   |   |
| PRN, Constipation, Starting Thu   |       | PST      |      |   |   |
| 18 at 1538                  |       |          |      |   |   |
+-----------------------------------+-------+----------+------+---+---+
 
 
 
+---+---+
|   |   |
+---+---+
 
 
 
+-----------------------------------+-------+----------+------+---+---+
|   prochlorperazine tablet 5 mg  5 | Given | 2019 | 5 mg |   |   |
|  mg, Oral, 2 Times Daily PRN,     |       |  11:35   |      |   |   |
| Nausea, Starting Thu 18 at  |       | PST      |      |   |   |
| 1538                              |       |          |      |   |   |
+-----------------------------------+-------+----------+------+---+---+
 
 
 
+---+---+
|   |   |
+---+---+
 
 
 
+-----------------------------------+-------+----------+---------+---+---+
|   rOPINIRole (REQUIP) tablet 0.75 | Given | 2019 | 0.75 mg |   |   |
|  mg  0.75 mg, Oral, Nightly,      |       |  22:00   |         |   |   |
| First dose on Thu 18 at     |       | PST      |         |   |   |
| 2200                              |       |          |         |   |   |
+-----------------------------------+-------+----------+---------+---+---+
 
 
 
+-------+----------+---------+---+---+
| Given | 2019 | 0.75 mg |   |   |
|       |  21:44   |         |   |   |
|       | PST      |         |   |   |
+-------+----------+---------+---+---+
| Given | 1/3/2019 | 0.75 mg |   |   |
|       |  21:10   |         |   |   |
|       | PST      |         |   |   |
+-------+----------+---------+---+---+
 
 
 
+---+---+
|   |   |
+---+---+
 
 
 
 
+-----------------------------------+-------+----------+--------+---+---+
|   sodium chloride (PF) 0.9 %      | Given | 20 | 10 mLs |   |   |
| flush 10 mL  10 mL, Intravenous,  |       | 18 07:47 |        |   |   |
| Every 8 Hours PRN, Line Care,     |       |  PST     |        |   |   |
| when tolerating oral fluids,      |       |          |        |   |   |
| Starting u 18 at 1538     |       |          |        |   |   |
+-----------------------------------+-------+----------+--------+---+---+
 
 
 
+-----------------------------------+---+
|                                   |   |
+-----------------------------------+---+
|   sodium chloride 0.9 % bolus 100 |   |
|  mL  100 mL, Intravenous, Every 5 |   |
|  Min PRN, for cramps or           |   |
| hypotension during dialysis.,     |   |
| Starting Thu 1/3/19 at 1706,      |   |
| DIALYSIS                          |   |
+-----------------------------------+---+
|                                   |   |
+-----------------------------------+---+
 
 
 
+-----------------------------------+---------+----------+---+----------+---+
|   sodium chloride 0.9 % infusion  | New Bag | 20 |   | 30 mL/hr |   |
|  at 30 mL/hr, Intravenous,        |         | 18 10:53 |   |          |   |
| Continuous, Starting Thu 18 |         |  PST     |   |          |   |
|  at 1130, Pre-op                  |         |          |   |          |   |
+-----------------------------------+---------+----------+---+----------+---+
 
 
 
+---+---+
|   |   |
+---+---+
 
 
 
+----------------------------------+-------+----------+----------+---+---+
|   vancomycin (VANCOCIN) 1000     | Given | 20 | 1,000 mg |   |   |
| mg/250 mL IVPB  1,000 mg,        |       | 18 17:44 |          |   |   |
| Intravenous, Administer over 60  |       |  PST     |          |   |   |
| Minutes, Once, Sun 18 at   |       |          |          |   |   |
| 1800, For 1 dose                 |       |          |          |   |   |
+----------------------------------+-------+----------+----------+---+---+
 
 
 
+---+---+
|   |   |
+---+---+
 
 
 
+-----------------------------------+-------+----------+--------+-------+---+
|   vancomycin (VANCOCIN) 750 mg in | Given | 2019 | 750 mg | 250   |   |
|  sodium chloride (IV) 0.9 % 250   |       |  16:42   |        | mL/hr |   |
| mL IVPB  750 mg, Intravenous,     |       | PST      |        |       |   |
 
| Administer over 60 Minutes, Once, |       |          |        |       |   |
|  Tue 19 at 1230, For 1 dose,  |       |          |        |       |   |
| Admin during last hour of         |       |          |        |       |   |
| dialysis OR immediately after     |       |          |        |       |   |
| dialysis                          |       |          |        |       |   |
+-----------------------------------+-------+----------+--------+-------+---+
 
 
 
+---+---+
|   |   |
+---+---+
 
 
 
+-----------------------------------+-------+----------+--------+---+---+
|   vancomycin (VANCOCIN) 750       | Given | 1/3/2019 | 750 mg |   |   |
| mg/250 mL IVPB  750 mg,           |       |  21:12   |        |   |   |
| Intravenous, Administer over 60   |       | PST      |        |   |   |
| Minutes, See Admin Instructions,  |       |          |        |   |   |
| Starting Sun 18 at 1745,    |       |          |        |   |   |
| Administer dose during last hour  |       |          |        |   |   |
| or immediately following each     |       |          |        |   |   |
| Hemodialysis session. Use Rx      |       |          |        |   |   |
| button to message pharmacy for    |       |          |        |   |   |
| dose.                             |       |          |        |   |   |
+-----------------------------------+-------+----------+--------+---+---+
 
 
 
+---+---+
|   |   |
+---+---+
 
 
 
+----------------------------------+-------+----------+--------+---+---+
|   vancomycin (VANCOCIN) 750      | Given | 2019 | 750 mg |   |   |
| mg/250 mL IVPB  750 mg,          |       |  16:44   |        |   |   |
| Intravenous, Administer over 60  |       | PST      |        |   |   |
| Minutes, Once, Wed 19 at     |       |          |        |   |   |
| 1500, For 1 dose, Give after     |       |          |        |   |   |
| dialysis                         |       |          |        |   |   |
+----------------------------------+-------+----------+--------+---+---+
 
 
 
+---+---+
|   |   |
+---+---+
 in this encounter

## 2019-04-03 NOTE — XMS
Clinical Summary
  Created on: 2019
 
 Cherie Jo
 External Reference #: [id number]
 : 48
 Sex: Female
 
 Demographics
 
 
+-----------------------+--------------------------+
| Address               | [address]               |
|                       | [place]  [address] |
+-----------------------+--------------------------+
| Home Phone            | [phone]          |
+-----------------------+--------------------------+
| Preferred Language    | Unknown                  |
+-----------------------+--------------------------+
| Marital Status        |                   |
+-----------------------+--------------------------+
| Religion Affiliation | [id number]                     |
+-----------------------+--------------------------+
| Race                  | Unknown                  |
+-----------------------+--------------------------+
| Ethnic Group          | Unknown                  |
+-----------------------+--------------------------+
 
 
 Author
 
 
+--------------+-----------------------+
| Author       | [email or web address] |
+--------------+-----------------------+
| Organization | [facility] |
+--------------+-----------------------+
| Address      | Unknown               |
+--------------+-----------------------+
| Phone        | Unavailable           |
+--------------+-----------------------+
 
 
 
 Support
 
 
+---------------+--------------+---------------------+-----------------+
| Name          | Relationship | Address             | Phone           |
+---------------+--------------+---------------------+-----------------+
| Anoop Jo | ECON         | [place], | [phone] |
|               |              |  [place]  [address]     |                 |
+---------------+--------------+---------------------+-----------------+
| Jennifer Dickson | ECON         | [place]       | [phone] |
|               |              | [id number]               |                 |
+---------------+--------------+---------------------+-----------------+
 
 
 
 
 Care Team Providers
 
 
+-----------------------+------+-----------------+
| Care Team Member Name | Role | Phone           |
+-----------------------+------+-----------------+
| Ivy Couch DO      | PP   | [phone] |
+-----------------------+------+-----------------+
 
 
 
 Allergies
 
 
+---------------------+----------------------+----------+----------+----------------------+
| Active Allergy      | Reactions            | Severity | Noted    | Comments             |
|                     |                      |          | Date     |                      |
+---------------------+----------------------+----------+----------+----------------------+
| Digitoxin           | Other (See Comments) | High     | 20 |   Toxic, patient     |
|                     |                      |          | 18       | states her eyes were |
|                     |                      |          |          |  also affected "she  |
|                     |                      |          |          | seen yellow          |
|                     |                      |          |          | everywhere"          |
+---------------------+----------------------+----------+----------+----------------------+
| Digoxin And Related | Other (See Comments) | Medium   | 20 |   toxic              |
|                     |                      |          | 18       |                      |
+---------------------+----------------------+----------+----------+----------------------+
| Metaxalone          | Other (See Comments) | Medium   |          |                      |
+---------------------+----------------------+----------+----------+----------------------+
| Morphine            | Mental Changes,      | High     | 20 |   "makes me feel     |
|                     | Other (See           |          | 12       | jittery"  Other      |
|                     | Comments), Anxiety   |          |          | reaction(s): MAKES   |
|                     |                      |          |          | HER "CRAZY"          |
|                     |                      |          |          | Hallucinations  Make |
|                     |                      |          |          |  her crazy/ "funny   |
|                     |                      |          |          | feeling in my head"  |
|                     |                      |          |          |  Has used            |
|                     |                      |          |          | hydromorphone        |
|                     |                      |          |          | in-house             |
+---------------------+----------------------+----------+----------+----------------------+
| Pantoprazole Sodium | Nausea and Vomiting  | Medium   | 20 |                      |
|                     |                      |          | 18       |                      |
+---------------------+----------------------+----------+----------+----------------------+
| Penicillin G        | Hives                | High     | 20 |                      |
|                     |                      |          | 10       |                      |
+---------------------+----------------------+----------+----------+----------------------+
| Penicillins         | Rash, Hives          | High     | 20 |   Other reaction(s): |
|                     |                      |          | 11       |  RASH  Tolerates     |
|                     |                      |          |          | cephalosporins       |
+---------------------+----------------------+----------+----------+----------------------+
| Quinapril Hcl       | Edema                | Medium   | 20 |   Facial Edema       |
|                     |                      |          | 13       |                      |
+---------------------+----------------------+----------+----------+----------------------+
| Quinapril Hcl       | Anaphylaxis          | High     | 20 |                      |
|                     |                      |          | 18       |                      |
+---------------------+----------------------+----------+----------+----------------------+
| Quinapril Hcl       | Angioedema           | High     | 20 |   Facial Edema       |
|                     |                      |          | 13       |                      |
+---------------------+----------------------+----------+----------+----------------------+
 
| Pseudoephedrine Hcl | Rash                 | Medium   | 20 |                      |
|                     |                      |          | 11       |                      |
+---------------------+----------------------+----------+----------+----------------------+
 
 
 
 Current Medications
 
 
+----------------------+----------------------+--------+---------+------+------+-------+
| Prescription         | Sig.                 | Disp.  | Refills | Star | End  | Statu |
|                      |                      |        |         | t    | Date | s     |
|                      |                      |        |         | Date |      |       |
+----------------------+----------------------+--------+---------+------+------+-------+
|   rOPINIRole         | Take 0.75 mg by      |        |         |      |      | Activ |
| (REQUIP) 0.25 MG     | mouth nightly.       |        |         |      |      | e     |
| tablet               |                      |        |         |      |      |       |
+----------------------+----------------------+--------+---------+------+------+-------+
|   nortriptyline      | Take 25-75 mg by     |        |         |      |      | Activ |
| (PAMELOR) 25 MG      | mouth See Admin      |        |         |      |      | e     |
| capsule              | Instructions. Takes  |        |         |      |      |       |
|                      | 25 mg by mouth every |        |         |      |      |       |
|                      |  morning and 75 mg   |        |         |      |      |       |
|                      | every night          |        |         |      |      |       |
+----------------------+----------------------+--------+---------+------+------+-------+
|   oxybutynin         | Take 7.5 mg by mouth |        |         |      |      | Activ |
| (DITROPAN) 5 MG      |  2 (two) times       |        |         |      |      | e     |
| tablet               | daily.               |        |         |      |      |       |
+----------------------+----------------------+--------+---------+------+------+-------+
|   albuterol          | Inhale 2 puffs into  |        |         |      |      | Activ |
| (PROVENTIL           | the lungs every 4    |        |         |      |      | e     |
| HFA;VENTOLIN HFA)    | (four) hours as      |        |         |      |      |       |
| 108 (90 Base)        | needed for Wheezing. |        |         |      |      |       |
| MCG/ACT              |                      |        |         |      |      |       |
| inhalerIndications:  |                      |        |         |      |      |       |
| states uses 2x       |                      |        |         |      |      |       |
| monthly average      |                      |        |         |      |      |       |
+----------------------+----------------------+--------+---------+------+------+-------+
|   insulin aspart     | Inject  into the     |        |         |      |      | Activ |
| (NOVOLOG) 100        | skin 3 (three) times |        |         |      |      | e     |
| UNIT/ML injection    |  daily before meals. |        |         |      |      |       |
|                      |  Sliding scale       |        |         |      |      |       |
+----------------------+----------------------+--------+---------+------+------+-------+
|   apixaban (ELIQUIS) | Take 1 tablet by     |   60   | 0       | 12/0 |      | Activ |
|  2.5 MG tablet       | mouth 2 (two) times  | tablet |         | 3/20 |      | e     |
|                      | daily.               |        |         | 18   |      |       |
+----------------------+----------------------+--------+---------+------+------+-------+
|   isosorbide         | Take 1 tablet by     |   30   | 1       | 12/0 | 12/0 | Activ |
| mononitrate (IMDUR)  | mouth daily.         | tablet |         | 5/20 | 5/20 | e     |
| 60 MG 24 hr tablet   |                      |        |         | 18   | 19   |       |
+----------------------+----------------------+--------+---------+------+------+-------+
|   ondansetron        | Take 4 mg by mouth   |        |         | 09/ |      | Activ |
| (ZOFRAN-ODT) 4 MG    | every 8 (eight)      |        |         | / |      | e     |
| disintegrating       | hours as needed.     |        |         | 18   |      |       |
| tablet               |                      |        |         |      |      |       |
+----------------------+----------------------+--------+---------+------+------+-------+
|   insulin degludec   | Inject 21 units      |        |         |      |      | Activ |
| (TRESIBA) 100        | every night          |        |         |      |      | e     |
| UNIT/ML injection    |                      |        |         |      |      |       |
+----------------------+----------------------+--------+---------+------+------+-------+
 
|   esomeprazole       | Take 1 capsule by    |        |         |      |      | Activ |
| (NEXIUM) 40 MG       | mouth every day      |        |         |      |      | e     |
| capsule              |                      |        |         |      |      |       |
+----------------------+----------------------+--------+---------+------+------+-------+
|                      | Take 1 tablet by     |   30   | 0       | 01/0 |      | Activ |
| HYDROcodone-acetamin | mouth every 4 (four) | tablet |         | 4/20 |      | e     |
| ophen (NORCO) 5-325  |  hours as needed.    |        |         | 19   |      |       |
| MG per tablet        |                      |        |         |      |      |       |
+----------------------+----------------------+--------+---------+------+------+-------+
|   carvedilol (COREG) | Take 1 tablet by     |   60   | 0       | 01/1 | 01/1 | Activ |
|  12.5 MG tablet      | mouth 2 (two) times  | tablet |         | /20 | 7/20 | e     |
|                      | daily with meals.    |        |         | 19   | 20   |       |
+----------------------+----------------------+--------+---------+------+------+-------+
|   nitroGLYCERIN      | Place 1 tablet under |   30   | 0       |  |  | Activ |
| (NITROSTAT) 0.4 MG   |  the tongue every 5  | tablet |         | /20 | 7 | e     |
| SL tablet            | (five) minutes as    |        |         | 19   | 20   |       |
|                      | needed for Chest     |        |         |      |      |       |
|                      | pain.                |        |         |      |      |       |
+----------------------+----------------------+--------+---------+------+------+-------+
|   prochlorperazine 5 | TAKE ONE TABLET BY   |   60   | 11      |  |      | Activ |
|  MG tablet           | MOUTH TWICE DAILY AS | tablet |         |  |      | e     |
|                      |  NEEDED FOR NAUSEA   |        |         | 19   |      |       |
+----------------------+----------------------+--------+---------+------+------+-------+
|   losartan (COZAAR)  | Take 100 mg by mouth |        |         | /2 |      | Activ |
| 100 MG tablet        |  daily.              |        |         | 020 |      | e     |
|                      |                      |        |         | 19   |      |       |
+----------------------+----------------------+--------+---------+------+------+-------+
|   TRINTELLIX 20 MG   | Take 20 mg by mouth  |        |         | /2 |      | Activ |
| TABS                 | every evening.       |        |         | 020 |      | e     |
|                      |                      |        |         | 19   |      |       |
+----------------------+----------------------+--------+---------+------+------+-------+
|   atorvastatin       | Take 80 mg by mouth  |        |         | 02/0 |      | Activ |
| (LIPITOR) 80 MG      | nightly.             |        |         | 3/20 |      | e     |
| tablet               |                      |        |         | 19   |      |       |
+----------------------+----------------------+--------+---------+------+------+-------+
|   LORazepam (ATIVAN) | Take 0.5 tablets by  |        |         | 02/1 |      | Activ |
|  1 MG tablet         | mouth every 8        |        |         | 5/20 |      | e     |
|                      | (eight) hours as     |        |         | 19   |      |       |
|                      | needed for Anxiety.  |        |         |      |      |       |
|                      | Patient states she   |        |         |      |      |       |
|                      | has the 1mg tablets  |        |         |      |      |       |
|                      | at home and that     |        |         |      |      |       |
|                      | they are scored and  |        |         |      |      |       |
|                      | she will split them  |        |         |      |      |       |
|                      | in half              |        |         |      |      |       |
+----------------------+----------------------+--------+---------+------+------+-------+
|   glucose blood test | Contour Next Test    |        |         |      |      | Activ |
|  strip               | Strips USE 1 STRIP   |        |         |      |      | e     |
|                      | TO CHECK GLUCOSE     |        |         |      |      |       |
|                      | THREE TIMES DAILY    |        |         |      |      |       |
|                      | FOR 30 DAYS          |        |         |      |      |       |
+----------------------+----------------------+--------+---------+------+------+-------+
|   LORazepam (ATIVAN) |                      |        |         | 02/2 |      | Activ |
|  0.5 MG tablet       |                      |        |         | 1/20 |      | e     |
|                      |                      |        |         | 19   |      |       |
+----------------------+----------------------+--------+---------+------+------+-------+
|   traMADol (ULTRAM)  | Take 1 tablet by     |   30   | 0       | 02/2 |      | Activ |
| 50 MG tablet         | mouth every 6 (six)  | tablet |         | 8/20 |      | e     |
|                      | hours as needed for  |        |         | 19   |      |       |
|                      | Pain.                |        |         |      |      |       |
 
+----------------------+----------------------+--------+---------+------+------+-------+
|   mupirocin          | Apply  topically     |   22 g | 0       | 03/0 | 03/1 | Expir |
| (BACTROBAN) 2 %      | daily for 7 days.    |        |         | 6/20 | 3/20 | ed    |
| ointment             |                      |        |         | 19   | 19   |       |
+----------------------+----------------------+--------+---------+------+------+-------+
 
 
 
 Active Problems
 
 
+-----------------------------------------------------+------------+
| Problem                                             | Noted Date |
+-----------------------------------------------------+------------+
| Macrocytosis                                        | 2019 |
+-----------------------------------------------------+------------+
| Lactic acidosis                                     | 2019 |
+-----------------------------------------------------+------------+
| Falls frequently                                    | 2019 |
+-----------------------------------------------------+------------+
| Restless legs syndrome                              | 2019 |
+-----------------------------------------------------+------------+
| Overactive bladder                                  | 2019 |
+-----------------------------------------------------+------------+
| CAD (coronary artery disease)                       | 2018 |
+-----------------------------------------------------+------------+
| Chronic multifocal osteomyelitis of left foot (HCC) | 2018 |
+-----------------------------------------------------+------------+
| PVD (peripheral vascular disease) ([facility])             | 2018 |
+-----------------------------------------------------+------------+
| COPD (chronic obstructive pulmonary disease) ([facility])  | 2018 |
+-----------------------------------------------------+------------+
| Mixed hyperlipidemia                                | 2018 |
+-----------------------------------------------------+------------+
 
 
 
+---------------------------------------------------------------+
|   Overview:   Last Assessment & Plan:                         |
| Moderate dose statin and don't titrate due to ESRD and ASHD.  |
| - Continue Atorvastatin 20mg                                  |
+---------------------------------------------------------------+
 
 
 
+-------------------------------------------------------+------------+
| ICD (implantable cardioverter-defibrillator) in place | 2018 |
+-------------------------------------------------------+------------+
 
 
 
+-------------------------------------------------------------------+
|   Overview:   Overview: Formatting of this note may be different  |
| from the original. MODEL NAME MODEL# SERIAL# DATE IMPLANTED       |
| GENERATOR [employer] EL ICD DF4 [id number] |
|  RV LEAD [place] [employer] [place] [address] [place] [id number] 18  |
| Indication: Nonischemic dilated cardiopathy with persistent       |
| severely depressed left ventricular systolic function, LVEF of    |
| LVEF of 30%                                                       |
+-------------------------------------------------------------------+
 
 
 
 
+-----------------------------------------------+------------+
| Implantable defibrillator reprogramming/check | 2018 |
+-----------------------------------------------+------------+
| Pleural effusion                              | 2018 |
+-----------------------------------------------+------------+
 
 
 
+-------------------------------------------------------------------+
|   Overview:   Last Assessment & Plan: Post MV repair and CABG x2  |
| 2018 and recurrent pleural effusions and elevated ESR.       |
| Plan:Received 1 time dose of Colchicine Started on Prednisone per |
|  surgery CXR today shows stable bilateral pleural effusions       |
|Received 1 time dose of Colchicine                                 |
|Started on Prednisone per surgery                                  |
|CXR today shows stable bilateral pleural effusions                 |
+-------------------------------------------------------------------+
 
 
 
+-------------------------------------------------+------------+
| Prolonged Q-T interval on ECG                   | 2018 |
+-------------------------------------------------+------------+
| Chronic systolic congestive heart failure (HCC) | 2018 |
+-------------------------------------------------+------------+
 
 
 
+-------------------------------------------------------------------+
|   Overview:   Overview: Echocardiogram 2018 shows mild       |
| biatrial dilatation, mild left ventricular dilatation with a mild |
|  eccentric left ventricular hypertrophy, there is a moderate      |
| global hypokinesis of left ventricle, lvef is 30-35%, normal      |
| right ventricular size and wall thickness with a mildly reduced   |
| right ventricular systolic function, mildly thickened and         |
| calcified trileaflet aortic valve with adequate opening, there is |
|  a mild aortic valve insufficiency, mildly thickened and          |
| calcified mitral valve suggesting myxomatous change with evidence |
|  of mitral valve repair, there is at least moderate central       |
| mitral valve regurgitation, mild tricuspid valve regurgitation,   |
| mild to moderate pulmonary hypertension with a peak systolic      |
| pressure of 50-55 mmhg, normal ivc with normal respiratory        |
| collapse, when compared to echocardiography on 3/7/18, left       |
| ventricular systolicsystolic function is slightly                 |
| improved.Echocardiogram 2018 show overall left ventricular    |
| systolic function is severely impaired with, an EF between 20 -   |
| 25 %, there is mild aortic regurgitation, there is mild aortic    |
| stenosis present, mild-to-moderate tricuspid regurgitation        |
| present, there is mild pulmonary hypertension, pleural effusion   |
| present. Duglas Ornelas MD, [facility]; [facility] Last Assessment & Plan: EF |
|  25% s/p CABG on 18 but now down to 15%. Plan:Continue Coreg |
|  and Losartan Hemodialysis for fluid removal.Strict I/O and daily |
|  weights.                                                         |
+-------------------------------------------------------------------+
 
 
 
 
+------------------+------------+
| Chronic diarrhea | 2018 |
+------------------+------------+
 
 
 
+---------------------------------------------+
|   Overview:   Colitis as working diagnosis. |
+---------------------------------------------+
 
 
 
+---------------------------------------------+------------+
| Chronic anticoagulation                     | 2018 |
+---------------------------------------------+------------+
| Plantar ulcer ([facility])                         | 2018 |
+---------------------------------------------+------------+
| Steroid-induced hyperglycemia               | 2018 |
+---------------------------------------------+------------+
| Moderate protein-calorie malnutrition (HCC) | 2018 |
+---------------------------------------------+------------+
| Stress hyperglycemia                        | 2018 |
+---------------------------------------------+------------+
| Cardiomyopathy (HCC)                        | 2018 |
+---------------------------------------------+------------+
 
 
 
+-------------------------------------------------------------------+
|   Overview:   Overview: Ischemic (3 V CAD) and non ischemic       |
| (Hypertension and MR and DM and ESRD). Tolerated metoprolol,      |
| losartan but avoid spironolactone due to ESRD. Last Assessment &  |
| Plan: Severe ischemic cardiomyopathy (EF 15%) and ESRD on HD who  |
| presented with dyspnea related to bilateral pleural effusions and |
|  episodic AF with RVR now s/p thoracentesis with significant      |
| improvement in her symptoms and in NSR. Plan:Volume removal with  |
| dialysis as toleratedLasix 80 mg twice a day Coreg and low dose   |
| losartan. Up titrate as tolerated but working on fluid removal    |
| and maintaining adequate BP. Strict I/O with daily weights.       |
|Strict I/O with daily weights.                                     |
+-------------------------------------------------------------------+
 
 
 
+--------------------------------------+------------+
| Paroxysmal atrial fibrillation (HCC) | 2018 |
+--------------------------------------+------------+
 
 
 
+-------------------------------------------------------------------------------------------
--------------------------------------------------------+
|   Overview:   Overview: PAF with dialysis in the past and                                 
                                                        |
| recurrent post op MV/CABG surgery. Last Assessment & Plan:                                
                                                        |
| Remains in NSRNormal atrial size by echocardiogram. Plan:Continue                         
                                                        |
|  Coreg with up-titration as toleratedAmiodarone 400 mg BID while                          
                                                        |
 
| inpatient and then transition to 200 mg BID for 2 weeks and then                          
                                                        |
| 200 mg daily. Continue po 1-3 months post discharge. Continue                             
                                                        |
| warfarin for CVA prophylaxis, INR 2.3 today                                               
                                                        |
|Plan:                                                                                      
                                                       |
|Continue Coreg with up-titration as tolerated                                              
                                                        |
|Amiodarone 400 mg BID while inpatient and then transition to 200 mg BID for 2 weeks and the
n 200 mg daily. Continue po 1-3 months post discharge.  |
|Continue warfarin for CVA prophylaxis, INR 2.3 today                                       
                                                        |
+-------------------------------------------------------------------------------------------
--------------------------------------------------------+
 
 
 
+--------------+------------+
| S/P CABG x 2 | 2018 |
+--------------+------------+
 
 
 
+-------------------------------------------------------------------------------------------
-----------+
|   Overview:   Overview: SVG's to OM and RCA at time of MV ring                            
           |
| Cristopher #28. POLY left.Last Assessment & Plan: 18: Mitral                            
           |
| valve repair with a 28 Cristopher annuloplasty ring; CABG x2 (SVG                           
           |
| to OM and SVG to RCA)Plan:ASA, statin, BB and ARB                                         
           |
|18: Mitral valve repair with a 28 Cristopher annuloplasty ring; CABG x2 (SVG to OM and S
VG to RCA) |
|                                                                                           
           |
|Plan:                                                                                      
          |
|ASA, statin, BB and ARB                                                                    
           |
+-------------------------------------------------------------------------------------------
-----------+
 
 
 
+-------------------------+------------+
| S/P mitral valve repair | 2018 |
+-------------------------+------------+
 
 
 
+----------------------------------------------------------------------------------------+
|   Overview:   Overview: Cristopher ring 2.16.18 ([place]) for severe                       |
| MR. POLY left. CABG too, SVG x 2 to OM and RCA.Last Assessment &                        |
| Plan: Cristopher ring 2.16.18 ([place]) for severe MR. POLY left. CABG                      |
|  too, SVG x 2 to OM and RCA.                                                           |
|Cristopher ring 2.16.18 ([place]) for severe MR. POLY left. CABG too, SVG x 2 to OM and RCA. |
 
+----------------------------------------------------------------------------------------+
 
 
 
+----------------------------------+------------+
| Osteomyelitis of left foot (HCC) | 2017 |
+----------------------------------+------------+
 
 
 
+-------------------------------------------------------------------+
|   Overview:   Added automatically from request for surgery [id number] |
+-------------------------------------------------------------------+
 
 
 
+-----------------------------------------------------+------------+
| Foot ulcer due to DM  (HCC)                         | 2017 |
+-----------------------------------------------------+------------+
| Severe protein-calorie malnutrition (HCC)           | 2017 |
+-----------------------------------------------------+------------+
| Loss of weight                                      | 2017 |
+-----------------------------------------------------+------------+
| Dysphagia, unspecified                              | 2017 |
+-----------------------------------------------------+------------+
| Diabetic foot ulcer (HCC)                           | 2017 |
+-----------------------------------------------------+------------+
| Dyslipidemia                                        | 2017 |
+-----------------------------------------------------+------------+
| Gastroesophageal reflux disease without esophagitis | 2017 |
+-----------------------------------------------------+------------+
| Hypertension, essential                             | 2016 |
+-----------------------------------------------------+------------+
 
 
 
+----------------------------------------------------+
|   Overview:   Last Assessment & Plan:              |
| Will resume metoprolol and losartan as BP allows.  |
+----------------------------------------------------+
 
 
 
+--------------------------------------+------------+
| ESRD (end stage renal disease) ([facility]) | 2016 |
+--------------------------------------+------------+
 
 
 
+-------------------------------------------------------------------+
|   Overview:   Primary nephrologist is Dr. Asher. She was on PD    |
| and eventually converted to HD and is dialyzed TTS using left UE  |
| AVF at [facility].                                          |
+-------------------------------------------------------------------+
 
 
 
+------------------------------------------------------------------+------------+
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
| dialysis, with long-term current use of insulin ([facility])            |            |
 
+------------------------------------------------------------------+------------+
| Anemia in ESRD (end-stage renal disease) ([facility])                   | 2016 |
+------------------------------------------------------------------+------------+
| Proteinuria                                                      | 2011 |
+------------------------------------------------------------------+------------+
| Vitamin D deficiency                                             | 2011 |
+------------------------------------------------------------------+------------+
| Secondary hyperparathyroidism ([facility])                              | 2011 |
+------------------------------------------------------------------+------------+
| Recurrent UTI                                                    | 2011 |
+------------------------------------------------------------------+------------+
| Coronary artery disease involving native coronary artery of      | 2011 |
| native heart without angina pectoris                             |            |
+------------------------------------------------------------------+------------+
 
 
 
+-------------------------------------------------------------------+
|   Overview:   Overview: [facility] on 2012 shows totally occluded   |
| right coronary artery with collateral filling from the left, no   |
| significant stenoses within the left anterior descending artery   |
| and circumflex artery. Patent stents within the left anterior     |
| descending artery, normal intracardiac pressure, mild narrowing   |
| within the distal aorta and iliac arteries. - Sherie Bartholomew,   |
| MDEchocardiogram on 2017 shows Study: a 2-dimensional        |
| transthoracic echocardiogram with m-mode, spectral and color flow |
|  Doppler was performed, left ventricular systolic function is     |
| low-normal with, an EF between 50 - 55 %, the left ventricle      |
| cavity size is normal, the RV is normal in size and function, the |
|  left atrium is normal in size, the right atrium is normal in     |
| size, aortic valve is trileaflet and is mildly thickened, there   |
| is mild aortic regurgitation, there is no evidence of aortic      |
| stenosis, the mitral valve is normal. - OLIVA Huffman |
|  on 2017 shows severe triple-vessel coronary artery disease   |
| with moderate ostial left anterior descending artery and patent   |
| stent in the midsegment, moderate stenosis in the second obtuse   |
| marginal branch and total occlusion of the proximal right         |
| coronary artery, which is known from before, given the patient's  |
| symptoms at this time, recommendation given. The patient          |
| understands. We will proceed with further medical management with |
|  blood pressure control. Also, we will optimize our blood         |
| pressure management. Further evaluation for the ostial left       |
| anterior descending artery and circumflex lesion upon recurrent   |
| symptoms for the patient, the patient will be evaluated for any   |
| further symptoms and fractional flow reserve of the left anterior |
|  descending artery and possible intervention on the left          |
| circumflex system will be considered. - OLIVA Diane   |
| on 2017 shows severe 3-vessel coronary artery disease with   |
| moderate to severe proximal left anterior descending with         |
| significant fractional flow reserve value of 0.77, severe mid     |
| large marginal branch, and chronically occluded right coronary    |
| artery with left-to-right collaterals, normal left ventricular    |
| end-diastolic pressure. - OLIVA Lambert on 2017     |
| shows three-vessel coronary artery disease with a chronically     |
| occluded right coronary artery with left-to-right collaterals,    |
| severe proximal left anterior descending artery with a patent     |
| stent in the mid left anterior descending artery and a            |
| significant FFR value of 0.77 during diagnostic angiogram on      |
| 2017, and severe disease of the second marginal branch |
|  of the left circumflex artery, successful PTCA and stenting of   |
 
| the second marginal branch of the left circumflex artery using a  |
| 2.25 x 16 and a 2.25 x 8 mm overlapping synergy drug-eluting      |
| stents postdilated using a 2.25 noncompliant balloon, successful  |
| direct stenting of the proximal and ostium of the left anterior   |
| descending artery using a 3.5 x 16 mm Synergy drug-eluting stent  |
| postdilated using a 3.5 noncompliant balloon. - Abdelazim Hashim, |
|  MDEchocardiogram on 2018 shows the left ventricle is normal |
|  in size, wall thickness and moderately impaired systolic         |
| function EF 35-40%. Inferior, inferolateral and anterolateral     |
| hypokinesis, the right ventricle is normal in size and function,  |
| severe mitral regurgitation with moderately dilated left atrium,  |
| mild tricuspid regurgitation and mild pulmonary hypertension RVSP |
|  48 mmHg, there is no pericardial effusion, new wall motion       |
| abnormalities and new severe mitral regurgitation compared to     |
| prior study. - Celestino Porras, MDLHC on 2018 shows         |
| three-vessel coronary artery disease with widely patent stent in  |
| the left anterior descending artery and severe stent restenosis   |
| in the marginal branch, occluded right coronary artery with       |
| collateral from left to right, severe left ventricular            |
| dysfunction with severe mitral regurgitation, status post         |
| percutaneous transluminal coronary angioplasty of in-stent        |
| restenosis within the marginal branch, recommend consideration    |
| for mitral valve replacement and 2-vessel coronary artery bypass  |
| surgery to the right coronary artery and marginal branch. - Iyad  |
| MD DustinTEE and CABG x2 on 2018 shows Severely reduced LV  |
| systolic function. Visually estimated LVEF 25%, normal LV size    |
| and shape. Normal wall thickness, normal RV size with normal      |
| function, LA enlarged. POLY normal size without SEC, masses, or    |
| thrombi. IAS intact, no PFO detected, mitral valve with bileaflet |
|  thickening and severely restricted motion, associated with       |
| ventricular tethering. Severe functional MR with central jet,     |
| trileaflet aortic valve with mild sclerosis. No significant       |
| aortic stenosis. Mild AI with central jet, tricuspid valve normal |
|  structure and function. Trace TR, pulmonic valve normal Trace    |
| MD, visible portions of ascending aorta and arch normal. Grade II |
|  atherosclerotic disease of descending aorta, pulmonary artery    |
| normal, normal pericardium. No pericardial or pleural effusions,  |
| left ventricular function slightly improved and right ventricular |
|  function unchanged compared to preop,  28 mm mitral valve        |
| annuloplasty ring is well-seated with an acceptable inflow        |
| gradient across the mitral valve and no MR noted, no change in    |
| the aortic, tricuspid, or pulmonic valves compared to preop, no   |
| change in visible portions of aorta after decannulation, LV and   |
| RV function unchanged after sternal closure. - Jagjit Hickey,       |
| MDEchocardiogram on 3/7/2018 shows a complete two-dimensional     |
| transthoracic echocardiogram was performed (2D, M-mode, Doppler   |
| and color flow Doppler), left ventricular systolic function is    |
| severely reduced, the visually estimated LV ejection fraction is  |
| 15%, the left and right atrial chambers are both normal in size,  |
| there is mild mitral regurgitation, an annuloplasty ring is noted |
|  in the mitral position, there is mild tricuspid regurgitation,   |
| Estimated PA pressure is 42 mmHg, the aortic valve leaflets       |
| appear mildly calcified, the aortic valve opens well, the aortic  |
| valve mean systolic gradient was measured at 9 mm Hg. - Star    |
| Missy Pino Assessment & Plan: GE[facility]GI in [facility] 5 months     |
| ago. 1 week off Plavix for CAGG/MVr surgery 2.16. Now and in the  |
| past with [facility] statin - data in dialysis patients for primary  |
| prevention with statin is not beneficial but this is secondary    |
| prevention. However, instead of high dose statin, moderate dose   |
| due to ESRD with slight increased risk of rhabdo. - Warfarin -    |
 
| ASA - Moderate dose Statin                                        |
+-------------------------------------------------------------------+
 
 
 
+------------+------------+
| Depression | 2011 |
+------------+------------+
 
 
 
 Resolved Problems
 
 
+----------------------------------------------------------------+----------+-----------+
| Problem                                                        | Noted    | Resolved  |
|                                                                | Date     | Date      |
+----------------------------------------------------------------+----------+-----------+
| Chest pain                                                     | 20 |  |
|                                                                | 19       | 9         |
+----------------------------------------------------------------+----------+-----------+
| Chest pain                                                     | 20 |  |
|                                                                | 18       | 8         |
+----------------------------------------------------------------+----------+-----------+
| Severe mitral regurgitation                                    | 20 |  |
|                                                                | 18       | 8         |
+----------------------------------------------------------------+----------+-----------+
| Precordial pain                                                | 20 |  |
|                                                                | 18       | 8         |
+----------------------------------------------------------------+----------+-----------+
| NSTEMI (non-ST elevated myocardial infarction) (HCC)           | 20 |  |
|                                                                | 17       | 8         |
+----------------------------------------------------------------+----------+-----------+
| Nausea and vomiting                                            | 20 |  |
|                                                                | 17       | 7         |
+----------------------------------------------------------------+----------+-----------+
| Abdominal pain, left upper quadrant                            | 20 |  |
|                                                                | 17       | 7         |
+----------------------------------------------------------------+----------+-----------+
| Partial small bowel obstruction (HCC)                          | 20 |  |
|                                                                | 17       | 7         |
+----------------------------------------------------------------+----------+-----------+
| Gastroenteritis                                                | 20 |  |
|                                                                | 17       | 7         |
+----------------------------------------------------------------+----------+-----------+
| Transient alteration of awareness                              | 20 |  |
|                                                                | 17       | 7         |
+----------------------------------------------------------------+----------+-----------+
| Pain of left hip joint                                         | 20 |  |
|                                                                | 17       | 7         |
+----------------------------------------------------------------+----------+-----------+
| Prolonged Q-T interval on ECG                                  | 20 |  |
|                                                                | 17       | 7         |
+----------------------------------------------------------------+----------+-----------+
| Hyperphosphatemia                                              | 20 |  |
|                                                                | 16       | 7         |
+----------------------------------------------------------------+----------+-----------+
| Fracture of left hip due to osteoporosis (HCC)                 | 20 |  |
|                                                                | 16       | 7         |
+----------------------------------------------------------------+----------+-----------+
 
| Closed fracture of base of fifth metatarsal bone of right foot | 20 |  |
|                                                                | 16       | 7         |
+----------------------------------------------------------------+----------+-----------+
| ESRD on peritoneal dialysis                                    | 20 |  |
|                                                                | 16       | 7         |
+----------------------------------------------------------------+----------+-----------+
| Acute abdominal pain                                           | 20 |  |
|                                                                | 16       | 7         |
+----------------------------------------------------------------+----------+-----------+
| Blood per rectum                                               | 20 |  |
|                                                                | 16       | 7         |
+----------------------------------------------------------------+----------+-----------+
| CKD (chronic kidney disease), stage V                          | 20 |  |
|                                                                | 11       | 6         |
+----------------------------------------------------------------+----------+-----------+
| Chronic diarrhea                                               | 20 |  |
|                                                                | 11       | 7         |
+----------------------------------------------------------------+----------+-----------+
 
 
 
+-------------------------+
|   Overview:   Resolved. |
+-------------------------+
 
 
 
+--------------------+----------+-----------+
| Stool incontinence | 20 |  |
|                    | 11       | 8         |
+--------------------+----------+-----------+
 
 
 
+-------------------------+
|   Overview:   Resolved. |
+-------------------------+
 
 
 
 Encounters
 
 
+--------+-------------+-----------+----------------------+----------------------+
| Date   | Type        | Specialty | Care Team            | Description          |
+--------+-------------+-----------+----------------------+----------------------+
| / | Documentati |           |   João Asher MD  | Other (February      |
|    | on Only     |           |                      | [facility] |
|        |             |           |                      |  Dialysis Rounding   |
|        |             |           |                      | Note)                |
+--------+-------------+-----------+----------------------+----------------------+
| / | Office      |           |   Srinivasan Abdi,  | Closed nondisplaced  |
| 2019   | Visit       |           | DPM                  | fracture of distal   |
|        |             |           |                      | phalanx of right     |
|        |             |           |                      | great toe with       |
|        |             |           |                      | routine healing,     |
|        |             |           |                      | subsequent encounter |
|        |             |           |                      |  (Primary Dx);       |
|        |             |           |                      | Post-operative       |
|        |             |           |                      | state; Open wound of |
 
|        |             |           |                      |  toe, subsequent     |
|        |             |           |                      | encounter            |
+--------+-------------+-----------+----------------------+----------------------+
| / | Documentati |           |   Peabody, Jessica   | Other ([facility]        |
|    | on Only     |           | NAHUN SPARKS                | medical records      |
|        |             |           |                      | request)             |
+--------+-------------+-----------+----------------------+----------------------+
| / | Office      |           |   Kirt Mclaughlin MD     | Steal syndrome as    |
| 2019   | Visit       |           |                      | complication of      |
|        |             |           |                      | dialysis access,     |
|        |             |           |                      | sequela (Primary     |
|        |             |           |                      | Dx); ESRD (end stage |
|        |             |           |                      |  renal disease)      |
|        |             |           |                      | (HCC); PAD           |
|        |             |           |                      | (peripheral artery   |
|        |             |           |                      | disease) ([facility])       |
+--------+-------------+-----------+----------------------+----------------------+
| / | Orders Only |           |   Dionne Danielson MA  |                      |
|    |             |           |                      |                      |
+--------+-------------+-----------+----------------------+----------------------+
| / | Office      |           |   Srinivasan Abdi,  | Closed nondisplaced  |
|    | Visit       |           | DPM                  | fracture of distal   |
|        |             |           |                      | phalanx of right     |
|        |             |           |                      | great toe with       |
|        |             |           |                      | routine healing,     |
|        |             |           |                      | subsequent encounter |
|        |             |           |                      |  (Primary Dx);       |
|        |             |           |                      | Post-operative       |
|        |             |           |                      | state; Toe pain,     |
|        |             |           |                      | right                |
+--------+-------------+-----------+----------------------+----------------------+
| 02/15/ | Documentati |           |   João Asher MD  | Other (January       |
2019   | on Only     |           |                      [facility]
|        |             |           |                      |  Dialysis Rounding   |
|        |             |           |                      | Note)                |
+--------+-------------+-----------+----------------------+----------------------+
| / | Emergency   |           |   Cookson, Rachel M, | Fall in home,        |
| 2019 - |             |           |  DO Raya,       | initial encounter    |
|        |             |           | MD Ginny            | (Primary Dx);        |
| 02/15/ |             |           | Baltazar Isidro,   | Cellulitis of right  |
|    |             |           | MD                   | toe; Closed          |
|        |             |           |                      | displaced fracture   |
|        |             |           |                      | of distal phalanx of |
|        |             |           |                      |  right great toe,    |
|        |             |           |                      | initial encounter;   |
|        |             |           |                      | Generalized          |
|        |             |           |                      | weakness; Fatigue,   |
|        |             |           |                      | unspecified type     |
+--------+-------------+-----------+----------------------+----------------------+
 
 
 
+---+-------------+
|   |   Discharge |
|   |  Summaries  |
|   | -           |
|   | Sanna, |
|   |  MD Baltazar  |
|   | -           |
|   | 02/15/2019  |
 
|   |  2:09 PM    |
|   | PST         |
|   | Formatting  |
|   | of this     |
|   | note may be |
|   |  different  |
|   | from the    |
|   | original.Pa |
|   | tient:      |
|   | Cherie       |
|   | Apple     |
|   | Wilfredo      |
|   |     :    |
|   | 1950    |
|   |   MRN:      |
|   | [id number] |
|   | te of       |
|   | Admission:  |
|   |  2019  |
|   |  Date of    |
|   | Discharge:  |
|   |  2/15/2019  |
|   |   Treatment |
|   |  Team:      |
|   | Consulting  |
|   | Physician:  |
|   | Srinivasan APONTE     |
|   | Julian,    |
|   | DPMConsulti |
|   | ng          |
|   | Physician:  |
|   | Andrés Elliott, |
|   |             |
|   | MDConsultin |
|   | g           |
|   | Physician:  |
|   | João H      |
|   | Akoum,      |
|   | MDAdmitting |
|   |  Provider:  |
|   | Ginny       |
|   | Dorota,   |
|   | MDDischargi |
|   | ng          |
|   | Provider:   |
|   | BALTAZAR       |
|   | Renetta ISIDRO
|   |             |
|   | MDDischarge |
|   |  Diagnoses: |
|   |  Principal  |
|   | Problem:    |
|   | Falls       |
|   | frequentlyA |
|   | ctive       |
|   | Problems:   |
|   | Depression  |
|   |  ESRD (end  |
|   | stage renal |
|   |  disease)   |
 
|   | (HCC)  Type |
|   |  2 diabetes |
|   |  mellitus   |
|   | with        |
|   | chronic     |
|   | kidney      |
|   | disease on  |
|   | chronic     |
|   | dialysis,   |
|   | with        |
|   | long-term   |
|   | current use |
|   |  of insulin |
|   |  (HCC)      |
|   | Anemia in   |
|   | ESRD        |
|   | (end-stage  |
|   | renal       |
|   | disease)    |
|   | (HCC)       |
|   | Hypertensio |
|   | n,          |
|   | essential   |
|   | Dyslipidemi |
|   | a           |
|   | Gastroesoph |
|   | ageal       |
|   | reflux      |
|   | disease     |
|   | without     |
|   | esophagitis |
|   |   Loss of   |
|   | weight      |
|   | Severe      |
|   | protein-karen |
|   | orie        |
|   | malnutritio |
|   | n (HCC)     |
|   | Chronic     |
|   | systolic    |
|   | congestive  |
|   | heart       |
|   | failure     |
|   | (HCC)  COPD |
|   |  (chronic   |
|   | obstructive |
|   |  pulmonary  |
|   | disease)    |
|   | (HCC)  ICD  |
|   | (implantabl |
|   | e           |
|   | cardioverte |
|   | r-defibrill |
|   | ator) in    |
|   | place       |
|   | Paroxysmal  |
|   | atrial      |
|   | fibrillatio |
|   | n (HCC)     |
|   | S/P CABG x  |
 
|   | 2  S/P      |
|   | mitral      |
|   | valve       |
|   | repair  PVD |
|   |             |
|   | (peripheral |
|   |  vascular   |
|   | disease)    |
|   | (HCC)  CAD  |
|   | (coronary   |
|   | artery      |
|   | disease)    |
|   | Macrocytosi |
|   | s  Lactic   |
|   | acidosis    |
|   | Restless    |
|   | legs        |
|   | syndrome    |
|   | Overactive  |
|   | bladderReso |
|   | lved        |
|   | Problems:   |
|   | * No        |
|   | resolved    |
|   | hospital    |
|   | problems. * |
|   |             |
|   | Procedures  |
|   | Performed:C |
|   | hief        |
|   | Complaint:R |
|   | eferral     |
|   | (Dr. Elliott)  |
|   | and Skin    |
|   | Complaint   |
|   | (right foot |
|   |  )Hospital  |
|   | Course:     |
|   | Frequent    |
|   | falls:      |
|   | Assessment: |
|   |   It was    |
|   | unclear     |
|   | initially   |
|   | The exact   |
|   | culprit and |
|   |  it was     |
|   | felt        |
|   | potentially |
|   |  she was a  |
|   | bit dry (as |
|   |  per        |
|   | nephrology  |
|   | who saw her |
|   |  this       |
|   | admission)  |
|   | as she had  |
|   | just had    |
|   | ultrafiltra |
|   | tion with   |
 
|   | HD, versus  |
|   | other       |
|   | culprit.    |
|   | However the |
|   |  patient    |
|   | did state   |
|   | that when   |
|   | she has     |
|   | recently    |
|   | fallen it   |
|   | was in the  |
|   | setting of  |
|   | not using   |
|   | her walker  |
|   | as she had  |
|   | been        |
|   | instructed  |
|   | to as she   |
|   | was only    |
|   | going a     |
|   | very short  |
|   | distance    |
|   | and felt    |
|   | she did not |
|   |  need it.   |
|   | She will be |
|   |  very       |
|   | diligent    |
|   | about using |
|   |  her walker |
|   |  and taking |
|   |  all needed |
|   |             |
|   | precautions |
|   | .  Coronary |
|   |  artery     |
|   | disease     |
|   | with        |
|   | history of  |
|   | CABG,       |
|   | peripheral  |
|   | vascular    |
|   | disease,    |
|   | hypertensio |
|   | n and       |
|   | hyperlipide |
|   | mark with    |
|   | history of  |
|   | paroxysmal  |
|   | atrial      |
|   | fibrillatio |
|   | n on        |
|   | anticoagula |
|   | tion:.      |
|   | Will resume |
|   |  PTA meds   |
|   | as below on |
|   |  d/c        |
|   | Overactive  |
|   | bladder:    |
 
|   | Continue    |
|   | oxybutynin. |
|   |   Requip    |
|   | for         |
|   | restless    |
|   | leg         |
|   | syndrome.   |
|   | Anxiety     |
|   | depression: |
|   |             |
|   | recommended |
|   |  to Refrain |
|   |  from using |
|   |             |
|   | benzodiazep |
|   | inesas much |
|   |  as         |
|   | possible    |
|   | and per d/w |
|   |  nephrology |
|   |  will       |
|   | decrease    |
|   | the ativan  |
|   | dose.       |
|   | Diabetes    |
|   | mellitus    |
|   | with        |
|   | diabetic    |
|   | nephropathy |
|   |  with       |
|   | end-stage   |
|   | renal       |
|   | disease on  |
|   | hemodialysi |
|   | s: Continue |
|   |  with       |
|   | current     |
|   | regimen for |
|   |  now,       |
|   | follow, and |
|   |  adjust as  |
|   | needed      |
|   | based on    |
|   | progress.   |
|   | F/u with    |
|   | outpatient  |
|   | providers   |
|   | With        |
|   | respect to  |
|   | her left    |
|   | foot prior  |
|   | injury and  |
|   | prior       |
|   | antibiotics |
|   | :           |
|   | Assessment: |
|   |   She was   |
|   | seen by ID  |
|   | here and    |
|   | recently    |
 
|   | had         |
|   | completed a |
|   |  course of  |
|   | antbx       |
|   | reportedly. |
|   |   They      |
|   | would like  |
|   | to have her |
|   |  off antbx  |
|   | for now and |
|   |  f/u        |
|   | withthem.   |
|   | Should f/u  |
|   | with her    |
|   | podiatrist  |
|   | dr abdi  |
|   | for her     |
|   | foot as     |
|   | well.       |
|   | Discharge   |
|   | Exam and    |
|   | Data:Vital  |
|   | Signs:BP    |
|   | 112/57 (BP  |
|   | Location:   |
|   | Right upper |
|   |  arm)  |    |
|   | Pulse 66  | |
|   |  Temp 98    |
|   |   F (36.7   |
|   |   C) (Oral) |
|   |   | Resp 18 |
|   |   | Ht      |
|   | 1.778 m (5' |
|   |  10")  | Wt |
|   |  65.3 kg    |
|   | (144 lb)  | |
|   |  SpO2 96%   |
|   | |           |
|   | Breastfeedi |
|   | ng? No  |   |
|   | BMI 20.66   |
|   | kg/m   I&O  |
|   | Last 3      |
|   | Shifts:  No |
|   |             |
|   | intake/outp |
|   | ut data     |
|   | recorded.Ph |
|   | ysical      |
|   | Examination |
|   | :           |
|   | Constitutio |
|   | nal: Alert  |
|   | and         |
|   | oriented to |
|   |  person,    |
|   | place, and  |
|   | time.       |
|   | Appears     |
 
|   | well-develo |
|   | ped and     |
|   | well-nouris |
|   | hed. HEENT: |
|   |  Neck       |
|   | supple, no  |
|   | JVD, non    |
|   | icteric     |
|   | sclera.Card |
|   | iovascular: |
|   |  Normal     |
|   | rate,       |
|   | regular     |
|   | rhythm,     |
|   | normal      |
|   | heart       |
|   | sounds, and |
|   |  intact     |
|   | distal      |
|   | pulses.     |
|   | Exam        |
|   | reveals no  |
|   | appreciated |
|   |  gallop or  |
|   | friction    |
|   | rub.  No    |
|   | murmur      |
|   | heard.Pulmo |
|   | nary/Chest: |
|   |  Effort     |
|   | normal and  |
|   | breath      |
|   | sounds      |
|   | normal. No  |
|   | stridor. No |
|   |             |
|   | respiratory |
|   |  distress.  |
|   |  no         |
|   | wheezes. no |
|   |  rales.     |
|   | exhibits no |
|   |             |
|   | tenderness. |
|   |  Abdominal: |
|   |  Soft.      |
|   | Bowel       |
|   | sounds are  |
|   | normal.     |
|   | exhibits no |
|   |             |
|   | distension. |
|   |  There is   |
|   | no          |
|   | tenderness. |
|   |  There is   |
|   | no rebound  |
|   | and no      |
|   | guarding.   |
|   | Extremeties |
 
|   | /Musculoske |
|   | letal:      |
|   | Normal      |
|   | general     |
|   | range of    |
|   | motion.exhi |
|   | bits no     |
|   | tenderness. |
|   |   exhibits  |
|   | no edema.   |
|   | Left foot   |
|   | in boot     |
|   | wrapped in  |
|   | dressing,   |
|   | see wound   |
|   | care and ID |
|   |  eval .     |
|   | Neurologica |
|   | l:  Alert   |
|   | and         |
|   | oriented to |
|   |  person,    |
|   | place, and  |
|   | time. Has   |
|   | normal      |
|   | reflexes.   |
|   | No gross    |
|   | cranial     |
|   | nerve or    |
|   | focal       |
|   | deficit.    |
|   | Exhibits    |
|   | normal      |
|   | muscle      |
|   | tone.       |
|   | Coordinatio |
|   | n           |
|   | normal.Skin |
|   | : Skin is   |
|   | warm and    |
|   | dry. No     |
|   | rash noted. |
|   |  No         |
|   | erythema.   |
|   | No pallor.  |
|   | Psychiatric |
|   | : Has a     |
|   | normal mood |
|   |  and affect |
|   |  given      |
|   | situation.  |
|   | Behavior is |
|   |  normal.    |
|   | Judgment    |
|   | normal for  |
|   | patient.    |
|   | Recent Labs |
|   |  Recent     |
|   | LabsLab     |
|   |  |
 
|   | 9 WBC 5.14  |
|   | HGB 10.1*   |
|   | HCT 30.9*   |
|   | *    |
|   | Recent      |
|   | LabsLab     |
|   |  |
|   | 9  K  |
|   | 4.7 CL 96*  |
|   | CO2 >40*    |
|   | BUN 9       |
|   | CREATININE  |
|   | 2.96* No    |
|   | results for |
|   |  input(s):  |
|   | INR in the  |
|   | last 168    |
|   | hours.Resul |
|   | ts          |
|   | Procedure   |
|   | Component   |
|   | Value Units |
|   |  Date/Time  |
|   |  Blood      |
|   | Culture Set |
|   |  2          |
|   | [[id number]]  |
|   | Collected:  |
|   |  19   |
|   | 1739        |
|   | Specimen:   |
|   | Blood from  |
|   | Blood,      |
|   | peripheral  |
|   | draw        |
|   | Updated:    |
|   | 19    |
|   | 2005  Blood |
|   |  Culture    |
|   | Set 1       |
|   | [[id number]]  |
|   | Collected:  |
|   |  19   |
|   | 1655        |
|   | Specimen:   |
|   | Blood from  |
|   | Blood Line  |
|   | Draw        |
|   | Updated:    |
|   | 19    |
|   | 2004        |
|   | Recent      |
|   | Radiology   |
|   | ResultsXr   |
|   | Toe Right 2 |
|   |  ViewResult |
|   |  Date:      |
|   | 2019No |
|   |             |
|   | radiographi |
 
|   | c evidence  |
|   | of          |
|   | osteomyelit |
|   | is seen.    |
|   | If further  |
|   | assessment  |
|   | is          |
|   | warranted,  |
|   | consider    |
|   | correlation |
|   |  with MRI.  |
|   | Potential   |
|   | acute       |
|   | fracture    |
|   | through the |
|   |  base of    |
|   | the distal  |
|   | phalanx.    |
|   | Signed by:  |
|   | Habbu, Gavin |
|   |  Sign       |
|   | Date/Time:  |
|   | 2019  |
|   | 5:15        |
|   | PMOutstandi |
|   | ng Issues:  |
|   |  F/u with   |
|   | podiatry,   |
|   | ID, PCP and |
|   |  resume HD. |
|   |   Discharge |
|   |             |
|   | Information |
|   | :Follow     |
|   | up:[facility]   |
|   | [address],      |
|   |  [address]     |
|   | [address],   |
|   | [place]         |
|   | [address] |
|   | [place]        |
|   | [id number] |
|   | [phone] |
|   | ule an      |
|   | appointment |
|   |  as soon as |
|   |  possible   |
|   | for a       |
|   | visitBrian  |
|   | L Abdi,  |
|   | [id number]      |
|   | [address], |
|   |  [place]        |
|   | [address] |
|   |  [place]         |
|   | [id number] |
|   | [phone] |
|   | ule an      |
|   | appointment |
|   |  as soon as |
 
|   |  possible   |
|   | for a       |
|   | visitplease |
|   |  continue   |
|   | prior to    |
|   | admission   |
|   | planJimmy   |
|   | [place],       |
|   | [id number]    |
|   | [address]  |
|   | [address] |
|   | [place]       |
|   | [id number] |
|   | [phone] |
|   | lease call  |
|   | to see when |
|   |  they would |
|   |  like to    |
|   | see you in  |
|   | follow up   |
|   | resume care |
|   |  with all   |
|   | providers   |
|   | you were    |
|   | seeing      |
|   | prior to    |
|   | admission   |
|   | Medication  |
|   | List        |
|   | CHANGE how  |
|   | you take    |
|   | these       |
|   | medications |
|   |   LORazepam |
|   |  1 MG       |
|   | tabletRefil |
|   | ls:         |
|   | 0Commonly   |
|   | known as:   |
|   | ATIVANTake  |
|   | 0.5 tablets |
|   |  by mouth   |
|   | every 8     |
|   | (eight)     |
|   | hours as    |
|   | needed for  |
|   | Anxiety.    |
|   | Patient     |
|   | states she  |
|   | has the 1mg |
|   |  tablets at |
|   |  home and   |
|   | that they   |
|   | are scored  |
|   | and she     |
|   | will split  |
|   | them in     |
|   | halfWhat    |
|   | changed:    |
|   | how much to |
 
|   |  take       |
|   | additional  |
|   | instruction |
|   | s  CONTINUE |
|   |  taking     |
|   | these       |
|   | medications |
|   |   albuterol |
|   |  108 (90    |
|   | Base)       |
|   | MCG/ACT     |
|   | inhalerRefi |
|   | lls:        |
|   | 0Commonly   |
|   | known as:   |
|   | PROVENTIL   |
|   | HFA;VENTOLI |
|   | N HFA       |
|   | apixaban    |
|   | 2.5 MG      |
|   | tabletQTY:  |
|   |  60         |
|   | tabletRefil |
|   | ls:         |
|   | 0Commonly   |
|   | known as:   |
|   | ELIQUISTake |
|   |  1 tablet   |
|   | by mouth 2  |
|   | (two) times |
|   |  daily.     |
|   | atorvastati |
|   | n 80 MG     |
|   | tabletRefil |
|   | ls:         |
|   | 0Commonly   |
|   | known as:   |
|   | LIPITOR     |
|   | carvedilol  |
|   | 12.5 MG     |
|   | tabletQTY:  |
|   |  60         |
|   | tabletRefil |
|   | ls:         |
|   | 0Doctor's   |
|   | comments:   |
|   | Hold for    |
|   | SBP less    |
|   | than        |
|   | 100Hold for |
|   |  HR less    |
|   | than        |
|   | 60Commonly  |
|   | known as:   |
|   | COREGTake 1 |
|   |  tablet by  |
|   | mouth 2     |
|   | (two) times |
|   |  daily with |
|   |  meals.     |
 
|   | esomeprazol |
|   | e 40 MG     |
|   | capsuleRefi |
|   | lls:        |
|   | 0Commonly   |
|   | known as:   |
|   | NEXIUM      |
|   | HYDROcodone |
|   | -acetaminop |
|   | hen 5-325   |
|   | MG per      |
|   | tabletQTY:  |
|   |  30         |
|   | tabletRefil |
|   | ls:         |
|   | 0Commonly   |
|   | known as:   |
|   | NORCOTake 1 |
|   |  tablet by  |
|   | mouth every |
|   |  4 (four)   |
|   | hours as    |
|   | needed.     |
|   | insulin     |
|   | aspart 100  |
|   | UNIT/ML     |
|   | injectionRe |
|   | fills:      |
|   | 0Commonly   |
|   | known as:   |
|   | NOVOLOG     |
|   | insulin     |
|   | degludec    |
|   | 100 UNIT/ML |
|   |             |
|   | injectionRe |
|   | fills:      |
|   | 0Commonly   |
|   | known as:   |
|   | TRESIBA     |
|   | isosorbide  |
|   | mononitrate |
|   |  60 MG 24   |
|   | hr          |
|   | tabletQTY:  |
|   |  30         |
|   | tabletRefil |
|   | ls:  1Take  |
|   | 1 tablet by |
|   |  mouth      |
|   | daily.      |
|   | losartan    |
|   | 100 MG      |
|   | tabletRefil |
|   | ls:         |
|   | 0Commonly   |
|   | known as:   |
|   | COZAAR      |
|   | nitroGLYCER |
|   | IN 0.4 MG   |
 
|   | SL          |
|   | tabletQTY:  |
|   |  30         |
|   | tabletRefil |
|   | ls:         |
|   | 0Doctor's   |
|   | comments:   |
|   | Hold for    |
|   | SBP less    |
|   | than        |
|   | 100Commonly |
|   |  known as:  |
|   |             |
|   | NITROSTATPl |
|   | ace 1       |
|   | tablet      |
|   | under the   |
|   | tongue      |
|   | every 5     |
|   | (five)      |
|   | minutes as  |
|   | needed for  |
|   | Chest pain. |
|   |             |
|   | nortriptyli |
|   | ne 25 MG    |
|   | capsuleRefi |
|   | lls:        |
|   | 0Commonly   |
|   | known as:   |
|   | PAMELOR     |
|   | ondansetron |
|   |  4 MG       |
|   | disintegrat |
|   | ing         |
|   | tabletRefil |
|   | ls:         |
|   | 0Commonly   |
|   | known as:   |
|   | ZOFRAN-ODT  |
|   | oxybutynin  |
|   | 5 MG        |
|   | tabletRefil |
|   | ls:         |
|   | 0Commonly   |
|   | known as:   |
|   | DITROPAN    |
|   | prochlorper |
|   | azine 5 MG  |
|   | tabletQTY:  |
|   |  60         |
|   | tabletRefil |
|   | ls:         |
|   | 11Doctor's  |
|   | comments:   |
|   | Please      |
|   | consider 90 |
|   |  day        |
|   | supplies to |
|   |  promote    |
 
|   | better      |
|   | adherenceTA |
|   | KE ONE      |
|   | TABLET BY   |
|   | MOUTH TWICE |
|   |  DAILY AS   |
|   | NEEDED FOR  |
|   | NAUSEA      |
|   | rOPINIRole  |
|   | 0.25 MG     |
|   | tabletRefil |
|   | ls:         |
|   | 0Commonly   |
|   | known as:   |
|   | REQUIP      |
|   | TRINTELLIX  |
|   | 20 MG       |
|   | TabsRefills |
|   | :  0Generic |
|   |  drug:      |
|   | Vortioxetin |
|   | e HBr  You  |
|   | might also  |
|   | be taking   |
|   | other       |
|   | medications |
|   |  not listed |
|   |  above. If  |
|   | you have    |
|   | questions   |
|   | about any   |
|   | of your     |
|   | other       |
|   | medications |
|   | , talk to   |
|   | the person  |
|   | who         |
|   | prescribed  |
|   | them or     |
|   | your        |
|   | Primary     |
|   | Care        |
|   | Provider.   |
|   |    Where to |
|   |  Get Your   |
|   | Medications |
|   |             |
|   | Information |
|   |  about      |
|   | where to    |
|   | get these   |
|   | medications |
|   |  is not yet |
|   |  available  |
|   |  Ask your   |
|   | nurse or    |
|   | doctor      |
|   | about these |
|   |             |
|   | medications |
 
|   |             |
|   | LORazepam 1 |
|   |  MG tablet  |
|   | Disposition |
|   | :           |
|   | HomeConditi |
|   | on:         |
|   | StableCode  |
|   | Status:     |
|   | Full        |
|   | CodeDischar |
|   | ge took 35  |
|   | minutes, to |
|   |  include    |
|   | final       |
|   | examination |
|   | ,           |
|   | discussion  |
|   | of          |
|   | admission,  |
|   | and         |
|   | preparation |
|   |  of         |
|   | prescriptio |
|   | ns,         |
|   | instruction |
|   | s for       |
|   | on-going    |
|   | care,       |
|   | follow-up   |
|   | and         |
|   | documentati |
|   | on of       |
|   | discharge   |
|   | summary.[facility] |
|   | TT          |
|   | SANNA, |
|   |  MD2:09 PM  |
+---+-------------+
 
 
 
+--------+-------------+---+----------------------+----------------------+
| / | Emergency   |   |   Chau Deleon,  |                      |
| 2019   |             |   | MD                   |                      |
+--------+-------------+---+----------------------+----------------------+
| / | Office      |   |   Kirt Mclaughlin MD     | PAD (peripheral      |
| 2019   | Visit       |   |                      | artery disease)      |
|        |             |   |                      | (HCC) (Primary Dx);  |
|        |             |   |                      | ESRD (end stage      |
|        |             |   |                      | renal disease) (HCC) |
+--------+-------------+---+----------------------+----------------------+
| / | Telephone   |   |   Kristen Tam, |                      |
|    |             |   |  RN                  |                      |
+--------+-------------+---+----------------------+----------------------+
| / | Telephone   |   |   Srinivasan Abdi,  | Follow-up            |
|    |             |   | DPM                  | (appointment)        |
+--------+-------------+---+----------------------+----------------------+
| / | Orders Only |   |   Luisa Segovia   |                      |
|    |             |   | G, MA                |                      |
 
+--------+-------------+---+----------------------+----------------------+
| / | Hospital    |   |   Connie,        | PVD (peripheral      |
| 2019 - | Encounter   |   | MD Liat Jarrett, | vascular disease)    |
|        |             |   |  Moiz Porter MD    | (Formerly [facility]) (Primary Dx);  |
| / |             |   |                      | ESRD (end stage      |
| 2019   |             |   |                      | renal disease) on    |
|        |             |   |                      | dialysis (Formerly [facility]);      |
|        |             |   |                      | Anemia in ESRD       |
|        |             |   |                      | (end-stage renal     |
|        |             |   |                      | disease) (Formerly [facility]);      |
|        |             |   |                      | Hypoalbuminemia;     |
|        |             |   |                      | Electrolyte          |
|        |             |   |                      | imbalance risk       |
+--------+-------------+---+----------------------+----------------------+
 
 
 
+---+-------------+
|   |   Discharge |
|   |  Summaries  |
|   | - Lyn,  |
|   | Prudence,     |
|   | MD-R2 -     |
|   | 2019  |
|   |  1:04 PM    |
|   | PST         |
|   | Formatting  |
|   | of this     |
|   | note may be |
|   |  different  |
|   | from the    |
|   | original.[facility] |
|   | [facility]        |
|   | [facility]    |
|   [facility]     |
|   | [facility] |
|   | ce:         |
|   | Hospitalist |
|   | Resident    |
|   | Discharge   |
|   | SummaryDate |
|   |  of         |
|   | Admission:  |
|   |             |
|   | 2019Da |
|   | te of       |
|   | Discharge:  |
|   |             |
|   | 20191/ |
|   | Disc |
|   | harge       |
|   | Provider:   |
|   | Prudence      |
|   | Lyn,    |
|   | [id number] |
|   | ent Team:   |
|   | Consulting  |
|   | Physician:  |
|   | Srinivasan APONTE     |
|   | Julian,    |
 
|   | DPMConsulti |
|   | ng          |
|   | Physician:  |
|   | Ethan      |
|   | Delmi,     |
|   | MDAdmitting |
|   |  Provider:  |
|   | Luiza      |
|   | Connie, |
|   |             |
|   | MDDischarge |
|   |  Diagnoses: |
|   |             |
|   | Principal   |
|   | Problem:    |
|   | PVD         |
|   | (peripheral |
|   |  vascular   |
|   | disease)    |
|   | (HCC)Active |
|   |  Problems:  |
|   |  ESRD (end  |
|   | stage renal |
|   |  disease)   |
|   | (HCC)  Type |
|   |  2 diabetes |
|   |  mellitus   |
|   | with        |
|   | chronic     |
|   | kidney      |
|   | disease on  |
|   | chronic     |
|   | dialysis,   |
|   | with        |
|   | long-term   |
|   | current use |
|   |  of insulin |
|   |  (HCC)      |
|   | Anemia in   |
|   | ESRD        |
|   | (end-stage  |
|   | renal       |
|   | disease)    |
|   | (HCC)       |
|   | Hypertensio |
|   | n,          |
|   | essential   |
|   | Chronic     |
|   | systolic    |
|   | congestive  |
|   | heart       |
|   | failure     |
|   | (HCC)  COPD |
|   |  (chronic   |
|   | obstructive |
|   |  pulmonary  |
|   | disease)    |
|   | (HCC)       |
|   | Paroxysmal  |
|   | atrial      |
 
|   | fibrillatio |
|   | n (HCC)     |
|   | CAD         |
|   | (coronary   |
|   | artery      |
|   | disease)Res |
|   | olved       |
|   | Problems:   |
|   | * No        |
|   | resolved    |
|   | hospital    |
|   | problems.   |
|   | *BRIEF      |
|   | HISTORY OF  |
|   | PRESENTATIO |
|   | N:          |
|   | Patient     |
|   | Summary:    |
|   | Per DrAdryan     |
|   | [facility] |
|   | [facility]   |
|   | from        |
|   | 2019:  |
|   | '67 y/o F   |
|   | with h/o    |
|   | ESRD on HD  |
|   | T,TH,Sat    |
|   | (        |
|   | [place]),     |
|   | DM2, PAD    |
|   | s/p         |
|   | angioplasty |
|   |  of LLE and |
|   |             |
|   | transmetata |
|   | rsal        |
|   | amputation  |
|   | of left     |
|   | foot        |
|   | 18 by |
|   |          |
|   | Abdi due |
|   |  to         |
|   | osteomyelit |
|   | is, CAD,    |
|   | s/p MI,     |
|   | CABG, AVR   |
|   | , HFrEF |
|   |  with last  |
|   | EF 20 to    |
|   | 25%, s/p    |
|   | AICD, AF on |
|   |  chronic    |
|   | anticoagula |
|   | tion who    |
|   | was sent to |
|   |  ED by   |
|   | [facility] for    |
|   | evaluation  |
|   | of right    |
 
|   | LE.         |
|   |   Patient   |
|   | reports     |
|   | that at HD  |
|   | yesterday   |
|   | it was      |
|   | noted that  |
|   | her right   |
|   | toes were   |
|   | red and     |
|   | dusky.      |
|   |   She went  |
|   | to see Dr.  |
|   | [facility] this   |
|   | morning and |
|   |  he was     |
|   | concerned   |
|   | that right  |
|   | toes could  |
|   | be ischemic |
|   |  or         |
|   | infected    |
|   | and         |
|   | referred to |
|   |  ED.        |
|   | 'HOSPITAL   |
|   | COURSE:     |
|   | Vascular    |
|   | surgery was |
|   |  consulted  |
|   | from the    |
|   | emergency   |
|   | department, |
|   |  they did a |
|   |  selective  |
|   | catheteriza |
|   | tion of the |
|   |  left       |
|   | common      |
|   | femoral     |
|   | artery and  |
|   | placed an   |
|   | SMA stent.  |
|   | The patient |
|   |  tolerated  |
|   | the         |
|   | procedure   |
|   | well.       |
|   | Podiatry    |
|   | was         |
|   | consulted,  |
|   | they        |
|   | recommended |
|   |  wound care |
|   |             |
|   | consultatio |
|   | n for the   |
|   | open wound  |
|   | on her left |
|   |  foot. They |
 
|   |  also       |
|   | stated that |
|   |  her right  |
|   | foot did    |
|   | not need a  |
|   | procedure   |
|   | for the     |
|   | toes at     |
|   | this time.  |
|   | Infectious  |
|   | disease is  |
|   | waiting for |
|   |  blood      |
|   | cultures    |
|   | and Gram    |
|   | stain and   |
|   | culture of  |
|   | the wound   |
|   | site to     |
|   | narrow her  |
|   | IV          |
|   | antibiotics |
|   | . She       |
|   | reported    |
|   | improved    |
|   | pain, no    |
|   | shortness   |
|   | of breath,  |
|   | no nausea   |
|   | and         |
|   | vomiting,   |
|   | she is      |
|   | making a    |
|   | small       |
|   | amount of   |
|   | urine and   |
|   | had a bowel |
|   |  movement   |
|   | today. She  |
|   | would like  |
|   | to go home. |
|   |  Physical   |
|   | therapy     |
|   | cleared her |
|   |  to go home |
|   |  with home  |
|   | assist.     |
|   | Nephrology  |
|   | was         |
|   | consulted   |
|   | and         |
|   | reported    |
|   | that they   |
|   | were        |
|   | amenable to |
|   |             |
|   | administeri |
|   | ng her IV   |
|   | antibiotics |
|   |  with       |
 
|   | dialysis.   |
|   | Infectious  |
|   | disease was |
|   |  consulted  |
|   | and agreed  |
|   | to this     |
|   | plan. Upon  |
|   | discharge   |
|   | the patient |
|   |  was        |
|   | eating,     |
|   | drinking,   |
|   | urinating,  |
|   | having      |
|   | bowel       |
|   | movements.s |
|   | he was not  |
|   | having      |
|   | fevers,     |
|   | nausea, or  |
|   | vomiting.   |
|   | No          |
|   | prescriptio |
|   | ns prior to |
|   |  admission. |
|   |  DISCHARGE  |
|   | EXAMVital   |
|   | Signs:BP    |
|   | 120/56 (BP  |
|   | Location:   |
|   | Right upper |
|   |  arm)  |    |
|   | Pulse 64  | |
|   |  Temp 97.5  |
|   |   F (36.4   |
|   |   C)        |
|   | (Axillary)  |
|   |  | Resp 18  |
|   |  | Ht 1.778 |
|   |  m (5' 10") |
|   |   | Wt 65.5 |
|   |  kg (144 lb |
|   |  6.4 oz)  | |
|   |  SpO2 97%   |
|   | |           |
|   | Breastfeedi |
|   | ng? No  |   |
|   | BMI 20.72   |
|   | kg/m        |
|   | Physical    |
|   | ExamGeneral |
|   |             |
|   | Appearance: |
|   |  Alert and  |
|   | cooperative |
|   | , sitting   |
|   | up in a     |
|   | chair by    |
|   | the bed and |
|   |  appears to |
 
|   |  be in no   |
|   | acute       |
|   | distress.   |
|   |   HEENNT:   |
|   | Normocephal |
|   | ic and      |
|   | atraumatic. |
|   |  Hearing    |
|   | grossly     |
|   | intact. No  |
|   | nasal       |
|   | discharge.  |
|   | No tracheal |
|   |  deviation. |
|   |             |
|   |   Cardiovas |
|   | cular:      |
|   | Rhythm and  |
|   | rate are    |
|   | regular.    |
|   | 2/6         |
|   | systolic    |
|   | murmur      |
|   | heard best  |
|   | over the    |
|   | left upper  |
|   | sternal     |
|   | border. No  |
|   | gallops or  |
|   | rubs        |
|   | appreciated |
|   | .           |
|   | Peripheral  |
|   | pulses 2+   |
|   | on the      |
|   | right. No   |
|   | lower       |
|   | extremity   |
|   | edema.  Pul |
|   | monary/Ches |
|   | t: Lungs    |
|   | are clear   |
|   | to          |
|   | auscultatio |
|   | n           |
|   | bilaterally |
|   | . Effort is |
|   |  normal.    |
|   | Normal      |
|   | breath      |
|   | sounds.     |
|   |   Abdominal |
|   | : Soft,     |
|   | nontender,  |
|   | nondistende |
|   | d.          |
|   | Normoactive |
|   |  bowel      |
|   | sounds. No  |
|   | guarding or |
 
|   |  rebound    |
|   | tenderness. |
|   |             |
|   |   Musculosk |
|   | eletal:     |
|   | Normal      |
|   | range of    |
|   | motion.     |
|   | Normal      |
|   | muscular    |
|   | development |
|   | . Patient   |
|   | with        |
|   | forefoot    |
|   | amputation  |
|   | on the      |
|   | left. Left  |
|   | foot        |
|   | wrapped in  |
|   | new         |
|   | dressing,   |
|   | C/D/I.      |
|   | Right foot  |
|   | with        |
|   | erythema    |
|   | over the    |
|   | great big   |
|   | toe and     |
|   | cyanosis    |
|   | over the    |
|   | second toe, |
|   |  improved   |
|   | from prior. |
|   |             |
|   | Neurologica |
|   | l: CN       |
|   | II-XII      |
|   | grossly     |
|   | intact.     |
|   | Oriented to |
|   |  person,    |
|   | place, and  |
|   | time.       |
|   |   Skin:     |
|   | Skin is     |
|   | warm and    |
|   | dry. No     |
|   | rash noted. |
|   |             |
|   |   Psychiatr |
|   | ic:The      |
|   | patient was |
|   |  able to    |
|   | demonstrate |
|   |  good       |
|   | judgement   |
|   | and reason, |
|   |  without    |
|   | hallucinati |
|   | ons,        |
 
|   | abnormal    |
|   | affect or   |
|   | abnormal    |
|   | behaviors   |
|   | during the  |
|   | examination |
|   | .           |
|   | DATARecent  |
|   | LabsLab     |
|   |  |
|   | 8           |
|   |  |
|   | 3           |
|   |  |
|   | 3 WBC 3.90  |
|   | 3.39* 5.53  |
|   | HGB 9.8*    |
|   | 9.5* 9.4*   |
|   | HCT 30.1*   |
|   | 29.2* 28.4* |
|   |  *   |
|   | 125* 147*   |
|   | NEUTOPHILPC |
|   | T  --       |
|   | 66.95  --   |
|   | MONOPCT  -- |
|   |   11.96  -- |
|   |   Recent    |
|   | LabsLab     |
|   |  |
|   | 8           |
|   |  |
|   | 3           |
|   |  |
|   | 3     |
|   | 140 139 K   |
|   | 3.9 4.2 4.0 |
|   |  CL 97* 101 |
|   |  100 CO2 31 |
|   |  28 29 BUN  |
|   | 13 26* 23   |
|   | CREATININE  |
|   | 4.8* 6.8*   |
|   | 6.1* PROT   |
|   | --   --     |
|   | 6.2*        |
|   | BILITOT  -- |
|   |    --  0.4  |
|   | ALT  --     |
|   | --  21 AST  |
|   |  --   --    |
|   | 24          |
|   | Phosphorus: |
|   |   Lab       |
|   | Results     |
|   | Component   |
|   | Value Date  |
|   |  PHOS 3.6   |
|   | 2019  |
|   | Invalid     |
 
|   | input(s):   |
|   | LABALBURece |
|   | nt LabsLab  |
|   |  |
|   | 8 MG 2.3    |
|   | Recent      |
|   | LabsLab     |
|   |  |
|   | 3 CKTOTAL   |
|   | 28*         |
|   | Intake/Outp |
|   | ut Summary  |
|   | (Last 24    |
|   | hours) at   |
|   | 19    |
|   | 1717Last    |
|   | data filed  |
|   | at 19 |
|   |  0850 Gross |
|   |  per 24     |
|   | hour Intake |
|   |             |
|   |    380 ml   |
|   | Output      |
|   |             |
|   | 0 ml Net    |
|   |             |
|   | 380 ml      |
|   | Microbiolog |
|   | y: Blood    |
|   | cultures -  |
|   | NGTDRadiolo |
|   | gyUs Lower  |
|   | Extremty    |
|   | Arterial    |
|   | RightResult |
|   |  Date:      |
|   | 20191. |
|   |  Right leg  |
|   | shows       |
|   | biphasic    |
|   | inflow with |
|   |  a greater  |
|   | than 50%    |
|   | stenosis at |
|   |  the origin |
|   |  of the     |
|   | superficial |
|   |  femoral    |
|   | artery      |
|   | (137-309).  |
|   |  Biphasic   |
|   | outflow. 2. |
|   |  Two vessel |
|   |  runoff to  |
|   | the right   |
|   | foot.       |
|   | Signed by:  |
|   | Iuliano,    |
|   | Edward Sign |
 
|   |  Date/Time: |
|   |  2019 |
|   |  3:11       |
|   | PMPLANDispo |
|   | sition:     |
|   | HomeConditi |
|   | on:         |
|   | StableCode  |
|   | Status:     |
|   | Full CodeNo |
|   |  discharge  |
|   | procedures  |
|   | on          |
|   | file.Follow |
|   |  up:[facility]  |
|   | [facility]      |
|   | V[facility]    |
|   | [id number] |
|   |  [facility]   |
|   | Dr Nhan      |
|   | [place]   |
|   | [place]  |
|   | [id number] |
|   | 2-9 |
|   | Follow up   |
|   | in 3        |
|   | week(s)[address] |
|   | li [address],   |
|   |  [address]     |
|   | [address],   |
|   | [place]         |
|   | [address] |
|   | [place]        |
|   | [id number] |
|   | [phone] |
|   | [address],    |
|   |  [address]     |
|   | [address],   |
|   | [place]         |
|   | [address] |
|   | [place]        |
|   | [id number] |
|   | 1-10In 1  |
|   | weekJimmy   |
|   | [place],       |
|   | [id number]    |
|   | [address]  |
|   | [address] |
|   | [place]       |
|   | [phone] |
|   | [phone]  |
|   | office for  |
|   | appointment |
|   |  Medication |
|   |  List       |
|   | START       |
|   | taking      |
|   | these       |
|   | medications |
|   |             |
 
|   | cefTAZidime |
|   |  2 g        |
|   | injectionQT |
|   | Y:  1       |
|   | eachRefills |
|   | :           |
|   | 0Commonly   |
|   | known as:   |
|   | FORTAZInjec |
|   | t 2 g into  |
|   | the muscle  |
|   | After       |
|   | Hemodialysi |
|   | s (see      |
|   | admin       |
|   | instruction |
|   | s) for 7    |
|   | days.       |
|   | vancomycin  |
|   | IVPBRefills |
|   | :           |
|   | 0Commonly   |
|   | known as:   |
|   | VANCOCINInj |
|   | ect 750 mg  |
|   | into the    |
|   | vein After  |
|   | Hemodialysi |
|   | s (see      |
|   | admin       |
|   | instruction |
|   | s) for 7    |
|   | days.       |
|   | Dilute in   |
|   | appropriate |
|   |  volume of  |
|   | IV fluid    |
|   | and give by |
|   |  slow IV    |
|   | infusion.   |
|   | CHANGE how  |
|   | you take    |
|   | these       |
|   | medications |
|   |   losartan  |
|   | 25 MG       |
|   | tabletQTY:  |
|   |  30         |
|   | tabletRefil |
|   | ls:         |
|   | 0Commonly   |
|   | known as:   |
|   | COZAARTake  |
|   | 1 tablet by |
|   |  mouth      |
|   | daily.What  |
|   | changed:    |
|   | how much to |
|   |  take       |
|   | CONTINUE    |
 
|   | taking      |
|   | these       |
|   | medications |
|   |   *         |
|   | albuterol   |
|   | 108 (90     |
|   | Base)       |
|   | MCG/ACT     |
|   | inhalerRefi |
|   | lls:        |
|   | 0Commonly   |
|   | known as:   |
|   | PROVENTIL   |
|   | HFA;VENTOLI |
|   | N HFA *     |
|   | VENTOLIN    |
|   |  (90 |
|   |  Base)      |
|   | MCG/ACT     |
|   | inhalerRefi |
|   | lls:        |
|   | 0Generic    |
|   | drug:       |
|   | albuterol   |
|   | apixaban    |
|   | 2.5 MG      |
|   | tabletQTY:  |
|   |  60         |
|   | tabletRefil |
|   | ls:         |
|   | 0Commonly   |
|   | known as:   |
|   | ELIQUISTake |
|   |  1 tablet   |
|   | by mouth 2  |
|   | (two) times |
|   |  daily.     |
|   | atorvastati |
|   | n 40 MG     |
|   | tabletQTY:  |
|   |  30         |
|   | tabletRefil |
|   | ls:         |
|   | 0Commonly   |
|   | known as:   |
|   | LIPITORTake |
|   |  1 tablet   |
|   | by mouth    |
|   | nightly.    |
|   | bisacodyl   |
|   | 10 MG       |
|   | suppository |
|   | Refills:    |
|   | 0Commonly   |
|   | known as:   |
|   | DULCOLAX    |
|   | calcium     |
|   | acetate 667 |
|   |  MG         |
|   | capsuleRefi |
 
|   | lls:        |
|   | 0Commonly   |
|   | known as:   |
|   | PHOSLO      |
|   | carvedilol  |
|   | 12.5 MG     |
|   | tabletQTY:  |
|   |  60         |
|   | tabletRefil |
|   | ls:         |
|   | 0Doctor's   |
|   | comments:   |
|   | Hold for    |
|   | SBP less    |
|   | than        |
|   | 100Hold for |
|   |  HR less    |
|   | than        |
|   | 60Commonly  |
|   | known as:   |
|   | COREGTake 1 |
|   |  tablet by  |
|   | mouth 2     |
|   | (two) times |
|   |  daily with |
|   |  meals.     |
|   | clopidogrel |
|   |  75 MG      |
|   | tabletQTY:  |
|   |  30         |
|   | tabletRefil |
|   | ls:         |
|   | 11Commonly  |
|   | known as:   |
|   | PLAVIXTake  |
|   | 1 tablet by |
|   |  mouth      |
|   | daily.      |
|   | esomeprazol |
|   | e 20 MG     |
|   | capsuleRefi |
|   | lls:        |
|   | 0Commonly   |
|   | known as:   |
|   | NEXIUM      |
|   | HYDROcodone |
|   | -acetaminop |
|   | hen 5-325   |
|   | MG per      |
|   | tabletQTY:  |
|   |  30         |
|   | tabletRefil |
|   | ls:         |
|   | 0Commonly   |
|   | known as:   |
|   | NORCOTake 1 |
|   |  tablet by  |
|   | mouth every |
|   |  4 (four)   |
|   | hours as    |
 
|   | needed.     |
|   | insulin     |
|   | aspart 100  |
|   | UNIT/ML     |
|   | injectionRe |
|   | fills:      |
|   | 0Commonly   |
|   | known as:   |
|   | NOVOLOG     |
|   | insulin     |
|   | degludec    |
|   | 100 UNIT/ML |
|   |             |
|   | injectionRe |
|   | fills:      |
|   | 0Commonly   |
|   | known as:   |
|   | TRESIBA     |
|   | isosorbide  |
|   | mononitrate |
|   |  60 MG 24   |
|   | hr          |
|   | tabletQTY:  |
|   |  30         |
|   | tabletRefil |
|   | ls:  1Take  |
|   | 1 tablet by |
|   |  mouth      |
|   | daily.      |
|   | loperamide  |
|   | 2 MG        |
|   | capsuleRefi |
|   | lls:        |
|   | 0Commonly   |
|   | known as:   |
|   | IMODIUM     |
|   | LORazepam 1 |
|   |  MG         |
|   | tabletQTY:  |
|   |  30         |
|   | tabletRefil |
|   | ls:         |
|   | 0Commonly   |
|   | known as:   |
|   | ATIVANTake  |
|   | 1 tablet by |
|   |  mouth      |
|   | every 8     |
|   | (eight)     |
|   | hours as    |
|   | needed for  |
|   | Anxiety.    |
|   | nitroGLYCER |
|   | IN 0.4 MG   |
|   | SL          |
|   | tabletQTY:  |
|   |  30         |
|   | tabletRefil |
|   | ls:         |
|   | 0Doctor's   |
 
|   | comments:   |
|   | Hold for    |
|   | SBP less    |
|   | than        |
|   | 100Commonly |
|   |  known as:  |
|   |             |
|   | NITROSTATPl |
|   | ace 1       |
|   | tablet      |
|   | under the   |
|   | tongue      |
|   | every 5     |
|   | (five)      |
|   | minutes as  |
|   | needed for  |
|   | Chest pain. |
|   |             |
|   | nortriptyli |
|   | ne 25 MG    |
|   | capsuleRefi |
|   | lls:        |
|   | 0Commonly   |
|   | known as:   |
|   | PAMELOR     |
|   | ondansetron |
|   |  4 MG       |
|   | disintegrat |
|   | ing         |
|   | tabletRefil |
|   | ls:         |
|   | 0Commonly   |
|   | known as:   |
|   | ZOFRAN-ODT  |
|   | oxybutynin  |
|   | 5 MG        |
|   | tabletRefil |
|   | ls:         |
|   | 0Commonly   |
|   | known as:   |
|   | DITROPAN    |
|   | prochlorper |
|   | azine 5 MG  |
|   | tabletQTY:  |
|   |  60         |
|   | tabletRefil |
|   | ls:         |
|   | 11Doctor's  |
|   | comments:   |
|   | Please      |
|   | consider 90 |
|   |  day        |
|   | supplies to |
|   |  promote    |
|   | better      |
|   | adherenceTA |
|   | KE ONE      |
|   | TABLET BY   |
|   | MOUTH TWICE |
|   |  DAILY AS   |
 
|   | NEEDED FOR  |
|   | NAUSEA      |
|   | ranitidine  |
|   | 150 MG      |
|   | tabletRefil |
|   | ls:         |
|   | 0Commonly   |
|   | known as:   |
|   | ZANTAC      |
|   | rOPINIRole  |
|   | 0.25 MG     |
|   | tabletRefil |
|   | ls:         |
|   | 0Commonly   |
|   | known as:   |
|   | REQUIP      |
|   | SLOW-MAG    |
|   | 71.5-119 MG |
|   |             |
|   | TbecRefills |
|   | :  0Generic |
|   |  drug:      |
|   | Magnesium   |
|   | Cl-Calcium  |
|   | Carbonate   |
|   | Vitamin D3  |
|   | 5000 units  |
|   | CapsRefills |
|   | :  0        |
|   | Vortioxetin |
|   | e HBr 10 MG |
|   |             |
|   | TabsRefills |
|   | :  0  *     |
|   | This list   |
|   | has 2       |
|   | medication( |
|   | s) that are |
|   |  the same   |
|   | as other    |
|   | medications |
|   |  prescribed |
|   |  for you.   |
|   | Read the    |
|   | directions  |
|   | carefully,  |
|   | and ask     |
|   | your doctor |
|   |  or other   |
|   | care        |
|   | provider to |
|   |  review     |
|   | them with   |
|   | you.    You |
|   |  might also |
|   |  be taking  |
|   | other       |
|   | medications |
|   |  not listed |
|   |  above. If  |
 
|   | you have    |
|   | questions   |
|   | about any   |
|   | of your     |
|   | other       |
|   | medications |
|   | , talk to   |
|   | the person  |
|   | who         |
|   | prescribed  |
|   | them or     |
|   | your        |
|   | Primary     |
|   | Care        |
|   | Provider.   |
|   |   STOP      |
|   | taking      |
|   | these       |
|   | medications |
|   |   aspirin   |
|   | 81 MG EC    |
|   | tablet      |
|   | Where to    |
|   | Get Your    |
|   | Medications |
|   |             |
|   | Information |
|   |  about      |
|   | where to    |
|   | get these   |
|   | medications |
|   |  is not yet |
|   |  available  |
|   |  Ask your   |
|   | nurse or    |
|   | doctor      |
|   | about these |
|   |             |
|   | medications |
|   |             |
|   | cefTAZidime |
|   |  2 g        |
|   | injection   |
|   |  vancomycin |
|   |  IVPB       |
|   | Prudence      |
|   | Lyn,    |
|   | MD-R2 |
|   | 0195:17 PM  |
+---+-------------+
 
 
 
+--------+-------------+---+----------------------+----------------------+
| /  Procedure   |   |                      |                      |
| 2019   | Pass        |   |                      |                      |
+--------+-------------+---+----------------------+----------------------+
| / | Procedure   |   |                      |                      |
|    | Pass        |   |                      |                      |
+--------+-------------+---+----------------------+----------------------+
 
| / | Procedure   |   |                      |                      |
|    | Pass        |   |                      |                      |
+--------+-------------+---+----------------------+----------------------+
| / | Orders Only |   |   Jessica Marley,   |                      |
| 2019   |             |   | [facility]RONA                 |                      |
+--------+-------------+---+----------------------+----------------------+
| / | Procedure   |   |                      |                      |
|    | Pass        |   |                      |                      |
+--------+-------------+---+----------------------+----------------------+
| / | Refill      |   |   João Asher MD  |                      |
| 2019   |             |   |                      |                      |
+--------+-------------+---+----------------------+----------------------+
| / | Telephone   |   |   Srinivasan Abdi,  | Medication Problem   |
|    |             |   | DPM                  | (Requesting Rx for   |
|        |             |   |                      | Wheelchair)          |
+--------+-------------+---+----------------------+----------------------+
| / | Emergency   |   |   Nathaniel De Luna S, | Hx of CABG (Primary  |
|  - |             |   |  DO  [facility],  | Dx); Chest pain in   |
|        |             |   | DO  Silas Ferrara MD  | adult; S/P MVR       |
|  |             |   |                      | (mitral valve        |
|    |             |   |                      | repair); Elevated    |
|        |             |   |                      | blood pressure       |
|        |             |   |                      | reading; Chronic     |
|        |             |   |                      | systolic congestive  |
|        |             |   |                      | heart failure (HCC); |
|        |             |   |                      |  Chest pain,         |
|        |             |   |                      | unspecified type     |
+--------+-------------+---+----------------------+----------------------+
 
 
 
+---+-------------+
|   |   Discharge |
|   |  Summaries  |
|   | - Alem,    |
|   | MD Silas -  |
|   | 2019  |
|   |  8:20 AM    |
|   | PST         |
|   | Formatting  |
|   | of this     |
|   | note may be |
|   |  different  |
|   | from the    |
|   | original.[facility] |
|   | [facility]        |
|   [facility]    |
|   [facility]     |
|   [facility] |
|   | ce:         |
|   | Hospitalist |
|   | Discharge   |
|   | SummaryDate |
|   |  of         |
|   | Admission:  |
|   |             |
|   | 2019Da |
|   | te of       |
|   | Discharge:  |
|   |             |
 
|   | 2019Di |
|   | scharge     |
|   | Physician:  |
|   |  Arwa Z     |
|   | Alem,      |
|   | MDTreatment |
|   |  Team:      |
|   | Admitting   |
|   | Provider:   |
|   | Negro A      |
|   | Brown,      |
|   | DODischarge |
|   |  Diagnoses: |
|   |             |
|   | Principal   |
|   | Problem:    |
|   | Chest       |
|   | painActive  |
|   | Problems:   |
|   | Coronary    |
|   | artery      |
|   | disease     |
|   | involving   |
|   | native      |
|   | coronary    |
|   | artery of   |
|   | native      |
|   | heart       |
|   | without     |
|   | angina      |
|   | pectoris    |
|   | ESRD (end   |
|   | stage renal |
|   |  disease)   |
|   | (HCC)  Type |
|   |  2 diabetes |
|   |  mellitus   |
|   | with        |
|   | chronic     |
|   | kidney      |
|   | disease on  |
|   | chronic     |
|   | dialysis,   |
|   | with        |
|   | long-term   |
|   | current use |
|   |  of insulin |
|   |  (HCC)      |
|   | Anemia in   |
|   | ESRD        |
|   | (end-stage  |
|   | renal       |
|   | disease)    |
|   | (HCC)       |
|   | Hypertensio |
|   | n,          |
|   | essential   |
|   | Pleural     |
|   | effusion    |
|   | Chronic     |
 
|   | systolic    |
|   | congestive  |
|   | heart       |
|   | failure     |
|   | (HCC)       |
|   | Chronic     |
|   | diarrhea    |
|   | Chronic     |
|   | anticoagula |
|   | tion        |
|   | Cardiomyopa |
|   | thy (HCC)   |
|   | COPD        |
|   | (chronic    |
|   | obstructive |
|   |  pulmonary  |
|   | disease)    |
|   | (HCC)  ICD  |
|   | (implantabl |
|   | e           |
|   | cardioverte |
|   | r-defibrill |
|   | ator) in    |
|   | place       |
|   | Paroxysmal  |
|   | atrial      |
|   | fibrillatio |
|   | n (HCC)     |
|   | Chronic     |
|   | multifocal  |
|   | osteomyelit |
|   | is of left  |
|   | foot (HCC)  |
|   |  PVD        |
|   | (peripheral |
|   |  vascular   |
|   | disease)    |
|   | (HCC)Resolv |
|   | ed          |
|   | Problems:   |
|   | * No        |
|   | resolved    |
|   | hospital    |
|   | problems.   |
|   | *Procedures |
|   | :  * No     |
|   | surgery     |
|   | found       |
|   | *Significan |
|   | t           |
|   | Diagnostic  |
|   | Studies:    |
|   | No results  |
|   | found.BRIEF |
|   |  HISTORY OF |
|   |             |
|   | PRESENTATIO |
|   | N:          |
|   | Cherie       |
|   | Apple     |
 
|   | Wilfredo is a |
|   |  68 y.o.    |
|   | female who  |
|   | presented   |
|   | per         |
|   | H&P"from    |
|   | [facility] |
|   |  because of |
|   |  chest pain |
|   |  that       |
|   | started     |
|   | last night  |
|   | about       |
|   | midnight    |
|   | and lasted  |
|   | until about |
|   |  5 AM this  |
|   | morning,    |
|   | pressure,   |
|   | intense,    |
|   | nonradiatin |
|   | g, with     |
|   | nausea and  |
|   | dyspnea, no |
|   |  sweating.  |
|   | Improved    |
|   | with SL NTG |
|   |  initially, |
|   |  but the    |
|   | NTG's       |
|   | became less |
|   |  effective  |
|   | as she took |
|   |  more of    |
|   | them (5     |
|   | total). Got |
|   |  a NTG      |
|   | patch at    |
|   | [facility]        |
|   | [facility] |
|   |  which was  |
|   | very        |
|   | effective   |
|   | in          |
|   | controlling |
|   |  the pain.  |
|   | She has     |
|   | very        |
|   | complicated |
|   |  cardiac    |
|   | history     |
|   | including   |
|   | CAD, CABG,  |
|   | systolic    |
|   | CHF,        |
|   | ischemic    |
|   | cardiomyopa |
|   | thy,        |
|   | pacemaker,  |
|   | valve       |
 
|   | repair,     |
|   | afib,       |
|   | anticoagula |
|   | tion, with  |
|   | several     |
|   | other       |
|   | comorbiditi |
|   | es          |
|   | including   |
|   | COPD, ESRD  |
|   | on HD, DM2, |
|   |  PVD. She   |
|   | has a       |
|   | cardiologis |
|   | t in [place]  [place]
|   [place] but   |
|   | [facility]        |
|   | [facility]    |
|   | refused to  |
|   | call that   |
|   | doctor to   |
|   | aid in      |
|   | decision-ma |
|   | nichelle and    |
|   | simply      |
|   | transferred |
|   |  the        |
|   | patient to  |
|   | [facility]      |
|   | ED.  The    |
|   | patient was |
|   |  at [facility]  |
|   | a month ago |
|   |  for L foot |
|   |  toe        |
|   | amputation  |
|   | due to      |
|   | osteomyelit |
|   | is. Dr      |
|   | Abdi is  |
|   | her         |
|   | surgeon.    |
|   | During that |
|   |             |
|   | hospitaliza |
|   | tion she    |
|   | c/o chest   |
|   | pain with   |
|   | troponin    |
|   | elevation   |
|   | to 0.318    |
|   | and was     |
|   | evaluated   |
|   | by          |
|   | cardiology. |
|   |  Stress     |
|   | test a      |
|   | month prior |
|   |  had been   |
|   | normal. It  |
 
|   | was decided |
|   |  that       |
|   | medical     |
|   | management  |
|   | was the     |
|   | only        |
|   | reasonable  |
|   | option for  |
|   | the         |
|   | patient,    |
|   | given her   |
|   | extensive   |
|   | comorbiditi |
|   | es.  Post-a |
|   | mputation   |
|   | she has     |
|   | been        |
|   | instructed  |
|   | to be       |
|   | strict NWB  |
|   | on LLE, and |
|   |  she was    |
|   | sent to     |
|   | [facility]     |
|   | [facility]. She    |
|   | remains on  |
|   | abx         |
|   | post-proced |
|   | ure managed |
|   |  by Dr      |
|   | Earl. The   |
|   | patient is  |
|   | convinced   |
|   | that the    |
|   | SNF is not  |
|   | administeri |
|   | ng her meds |
|   |  correctly. |
|   |  She thinks |
|   |  they might |
|   |  be         |
|   | skipping    |
|   | her         |
|   | Eliquis,    |
|   | clopidogrel |
|   | , and       |
|   | carvedilol. |
|   |  This       |
|   | happened    |
|   | before,     |
|   | earlier in  |
|   | 2018. She   |
|   | says the    |
|   | nurses      |
|   | chart that  |
|   | the meds    |
|   | are given   |
|   | but they    |
|   | are not     |
|   | given. She  |
 
|   | does not    |
|   | want to     |
|   | return to   |
|   | [facility], if |
|   |  at all     |
|   | possible,   |
|   | and wants   |
|   | to return   |
|   | home with   |
|   | paid        |
|   | caregiver   |
|   | if that     |
|   | will comply |
|   |  with Dr    |
|   | Abdi's   |
|   | post-op     |
|   | instruction |
|   | s"HOSPITAL  |
|   | COURSE:     |
|   | She was     |
|   | seen by     |
|   |  and |
|   |  he recc to |
|   |  stop all   |
|   | IV abx.She  |
|   | was         |
|   | discharged  |
|   | home with   |
|   | full time   |
|   | care giver. |
|   |  She was    |
|   | seen by     |
|   |  |
|   |  and he     |
|   | recc        |
|   | medical     |
|   | management  |
|   | Patient was |
|   |  discharged |
|   |  in stable  |
|   | condition.  |
|   | Addressed   |
|   | all of      |
|   | their       |
|   | questions   |
|   | and covered |
|   |  with side  |
|   | affects of  |
|   | newly added |
|   |             |
|   | medications |
|   | . she was   |
|   | cleared per |
|   |  consulting |
|   |             |
|   | services.Pa |
|   | st Medical  |
|   | History     |
|   | Diagnosis   |
|   | Date        |
 
|   |   Acute MI  |
|   | (HCC)   |
|   |  with       |
|   | stenting x  |
|   | 1           |
|   |   Anemia    |
|   | associated  |
|   | with        |
|   | chronic     |
|   | renal       |
|   | failure     |
|   | 2016   |
|   |             |
|   |   Atrial    |
|   | fibrillatio |
|   | n (HCC)     |
|   |   Chronic   |
|   | kidney      |
|   | disease     |
|   |             |
|   | Congestive  |
|   | heart       |
|   | failure     |
|   | (HCC)       |
|   |   COPD      |
|   | (chronic    |
|   | obstructive |
|   |  pulmonary  |
|   | disease)    |
|   | (HCC)       |
|   |   COPD      |
|   | (chronic    |
|   | obstructive |
|   |  pulmonary  |
|   | disease)    |
|   | (HCC)       |
|   | 2018   |
|   |             |
|   |   Coronary  |
|   | artery      |
|   | disease     |
|   | stent       |
|   | placements: |
|   |  3 total    |
|   |             |
|   | Depression  |
|   |             |
|   |   Diabetes  |
|   | other       |
|   | abstracted  |
|   |             |
|   |   Diabetes  |
|   | mellitus,   |
|   | type 2      |
|   | (HCC)       |
|   |   ESRD on   |
|   | peritoneal  |
|   | dialysis    |
|   | (HCC)       |
|   |   GERD      |
 
|   | (gastroesop |
|   | hageal      |
|   | reflux      |
|   | disease)    |
|   | 1992        |
|   |             |
|   | Hemodialysi |
|   | s patient   |
|   | (HCC)       |
|   | 10/2016     |
|   | Stopped     |
|   | peritoneal  |
|   | and         |
|   | restarted   |
|   | hemodialysi |
|   | s           |
|   |             |
|   | Hemorrhage  |
|   | of          |
|   | gastrointes |
|   | tinal       |
|   | tract,      |
|   | unspecified |
|   |  2017    |
|   | low GI,     |
|   | rectal      |
|   | bleeding    |
|   |   High      |
|   | blood       |
|   | pressure    |
|   | other       |
|   | abstracted  |
|   |             |
|   |   High      |
|   | cholesterol |
|   |  other      |
|   | abstracted  |
|   |             |
|   |             |
|   | Hyperlipide |
|   | mark         |
|   |             |
|   | Hypertensio |
|   | n           |
|   |             |
|   | Hyponatremi |
|   | a 2017  |
|   |             |
|   |             |
|   | Myocardial  |
|   | infarction  |
|   | (HCC)       |
|   | 2017     |
|   |   Other     |
|   | chronic     |
|   | pain        |
|   | feet,       |
|   |   Pain of   |
|   | left hip    |
|   | joint       |
 
|   | 2016  |
|   |  due to hip |
|   |  fracture   |
|   | and         |
|   | replacement |
|   |             |
|   |   Partial   |
|   | small bowel |
|   |             |
|   | obstruction |
|   |  (HCC)      |
|   | 2017  |
|   |  resolved   |
|   |             |
|   |   Renal     |
|   | failure     |
|   | Stage 5.    |
|   | Fistula in  |
|   | the JAN -   |
|   | not on      |
|   | dialysis    |
|   | yet.        |
|   |             |
|   | Unspecified |
|   |  visual     |
|   | disturbance |
|   |    glasses  |
|   | Past        |
|   | Surgical    |
|   | History     |
|   | Procedure   |
|   | Laterality  |
|   | Date        |
|   |   AV        |
|   | FISTULA     |
|   | PLACEMENT   |
|   |   left  |
|   | upper arm   |
|   | AV fistula  |
|   | - failed    |
|   |   AV        |
|   | FISTULA     |
|   | REPAIR N/A  |
|   | 10/25/2016  |
|   |  Procedure: |
|   |  AV FISTULA |
|   |  - GRAFT    |
|   | REPAIR/REVI |
|   | PRANEETH;       |
|   | Surgeon:    |
|   | Kirt Y Arnoldo,  |
|   | MD;         |
|   | Location:   |
|   | [facility]   |
|   | OR;         |
|   | Service:    |
|   | Vascular;   |
|   | Laterality: |
|   |  N/A;       |
|   | Superficial |
 
|   | ization and |
|   |  revision   |
|   | of left arm |
|   |  fistula    |
|   |   CARDIAC   |
|   | CATHETERIZA |
|   | TION        |
|   | 2017  |
|   |             |
|   |   CARPAL    |
|   | TUNNEL      |
|   | RELEASE     |
|   | Bilateral   |
|   | other       |
|   | abstracted  |
|   |             |
|   |   CATARACT  |
|   | EXTRACTION  |
|   | Bilateral   |
|   |         |
|   |   CATHETER  |
|   | REMOVAL N/A |
|   |  2016 |
|   |             |
|   | Procedure:  |
|   | DIALYSIS    |
|   | CATHETER -  |
|   | REMOVAL;    |
|   | Surgeon:    |
|   | Kirt Mclaughlin,  |
|   | MD;         |
|   | Location:   |
|   | [facility]   |
|   | OR;         |
|   | Service:    |
|   | Vascular;   |
|   | Laterality: |
|   |  N/A;  PD   |
|   | cath        |
|   | removal     |
|   |             |
|   | CHOLECYSTEC |
|   | FELY  other |
|   |             |
|   | abstracted  |
|   |             |
|   |             |
|   | COLONOSCOPY |
|   |             |
|   |   CORONARY  |
|   | ARTERY      |
|   | BYPASS      |
|   | GRAFT       |
|   |   CORONARY  |
|   | STENT       |
|   | PLACEMENT   |
|   | 2017  |
|   |  Drug       |
|   | Elutin |
|   |  stents to  |
 
|   | OM, 1 stent |
|   |  to prox.   |
|   | LAD         |
|   |   EYE       |
|   | SURGERY     |
|   |             |
|   |   FOOT      |
|   | AMPUTATION  |
|   | Left        |
|   | 2018  |
|   |  Procedure: |
|   |  FOREFOOT - |
|   |             |
|   | AMPUTATION; |
|   |   Surgeon:  |
|   | Srinivasan L     |
|   | Abdi,    |
|   | DPM;        |
|   | Location:   |
|   | [facility]   |
|   | OR;         |
|   | Service:    |
|   | Podiatry;   |
|   | Laterality: |
|   |  Left;      |
|   |   HARDWARE  |
|   | PRESENT     |
|   | stent       |
|   | placements  |
|   | x 3, Left   |
|   | hip         |
|   | replacement |
|   | , vascular  |
|   | stents 2 in |
|   |  left leg   |
|   |             |
|   |   HIP       |
|   | ARTHROPLAST |
|   | Y Left      |
|   | 2016   |
|   | Procedure:  |
|   | HIP -       |
|   | ARTHROPLAST |
|   | Y(ALLISON);    |
|   | Surgeon:    |
|   | Uriel  |
|   | MD Crispin;  |
|   |  Location:  |
|   | [facility]   |
|   | OR;         |
|   | Service:    |
|   | Orthopedics |
|   | ;           |
|   | Laterality: |
|   |  Left;      |
|   |             |
|   | HYSTERECTOM |
|   | Y  1998     |
|   | complete    |
|   |   PACEMAKER |
 
|   |  INSERTION  |
|   |    [place]   |
|   | scientific  |
|   | pacemaker/d |
|   | efib        |
|   |             |
|   | PERITONEAL  |
|   | CATHETER    |
|   | INSERTION   |
|   | 8/15/2013   |
|   |             |
|   |             |
|   | PERITONEAL  |
|   | CATHETER    |
|   | INSERTION   |
|   | N/A         |
|   | 2016   |
|   | Procedure:  |
|   | LAPAROSCOPI |
|   | C -         |
|   | PERITONEAL  |
|   | DIALYSIS    |
|   | CATH        |
|   | INSERTION;  |
|   |  Surgeon:   |
|   | Kirt Mclaughlin,  |
|   | MD;         |
|   | Location:   |
|   | [facility]   |
|   | OR;         |
|   | Service:    |
|   | Vascular;   |
|   | Laterality: |
|   |  N/A;       |
|   | Removal of  |
|   | old         |
|   | catheter    |
|   |   SHOULDER  |
|   | SURGERY     |
|   | Right 1980  |
|   |  frozen     |
|   | shoulder    |
|   |   UNLISTED  |
|   | PROCEDURE   |
|   | ARTHROSCOPY |
|   |     total   |
|   | Left hip    |
|   |   vascular  |
|   | stents Left |
|   |  2017    |
|   | leg (2      |
|   | stents)     |
|   |   VASCULAR  |
|   | SURGERY     |
|   | Allergies   |
|   | Allergen    |
|   | Reactions   |
|   |             |
|   |   Quinapril |
|   |  Hcl        |
 
|   | Anaphylaxis |
|   |             |
|   |   Digoxin   |
|   | And Related |
|   |  Other (See |
|   |  Comments)  |
|   |   toxic     |
|   |             |
|   | Metaxalone  |
|   | Other (See  |
|   | Comments)   |
|   |             |
|   |   Morphine  |
|   | Mental      |
|   | Changes     |
|   | Make her    |
|   | crazy/      |
|   | "funny      |
|   | feeling in  |
|   | my head"Has |
|   |  used       |
|   | hydromorpho |
|   | ne in-house |
|   |             |
|   |             |
|   | Pantoprazol |
|   | e Sodium    |
|   | Nausea and  |
|   | Vomiting    |
|   |             |
|   | Penicillins |
|   |  Rash       |
|   |   Quinapril |
|   |  Hcl Edema  |
|   |   Facial    |
|   | Edema       |
|   |   Sudafed   |
|   | [Pseudoephe |
|   | drine Hcl]  |
|   | Rash No     |
|   | prescriptio |
|   | ns prior to |
|   |  admission. |
|   |  DISCHARGE  |
|   | EXAMVital   |
|   | Signs:BP    |
|   | 108/56 (BP  |
|   | Location:   |
|   | Right upper |
|   |  arm)  |    |
|   | Pulse 68  | |
|   |  Temp 97.7  |
|   |   F (36.5   |
|   |   C)        |
|   | (Axillary)  |
|   |  | Resp 20  |
|   |  | Ht 1.778 |
|   |  m (5' 10") |
|   |   | Wt 65.5 |
|   |  kg (144 lb |
 
|   |  6.4 oz)  | |
|   |  SpO2 92%   |
|   | | BMI 20.72 |
|   |  kg/m       |
|   | General     |
|   | appearance: |
|   |  alert,     |
|   | appears     |
|   | stated age, |
|   |             |
|   | cooperative |
|   | , fatigued  |
|   | and no      |
|   | distressHea |
|   | d:          |
|   | Normocephal |
|   | ic, without |
|   |  obvious    |
|   | abnormality |
|   | ,           |
|   | atraumaticN |
|   | chloe: no     |
|   | adenopathy, |
|   |  no carotid |
|   |  bruit, no  |
|   | JVD,        |
|   | supple,     |
|   | symmetrical |
|   | , trachea   |
|   | midline and |
|   |  thyroid    |
|   | not         |
|   | enlarged,   |
|   | symmetric,  |
|   | no          |
|   | tenderness/ |
|   | mass/nodule |
|   | sLungs:     |
|   | clear to    |
|   | auscultatio |
|   | n           |
|   | bilaterally |
|   | Heart:      |
|   | regular     |
|   | rate and    |
|   | rhythm, S1, |
|   |  S2 normal, |
|   |  no murmur, |
|   |  click, rub |
|   |  or         |
|   | gallopAbdom |
|   | en: soft,   |
|   | non-tender; |
|   |  bowel      |
|   | sounds      |
|   | normal; no  |
|   | masses,  no |
|   |             |
|   | organomegal |
|   | yExtremitie |
 
|   | s: left leg |
|   |  boot       |
|   | Pulses: 2+  |
|   | and         |
|   | symmetricNe |
|   | urologic:   |
|   | Grossly     |
|   | normal AXO3 |
|   |  non focal  |
|   | DATACBC:    |
|   | Lab Results |
|   |  Component  |
|   | Value Date  |
|   |  WBC 4.08   |
|   | 2019  |
|   |  RBC 2.83   |
|   | (L)         |
|   | 2019  |
|   |  HGB 9.7    |
|   | (L)         |
|   | 2019  |
|   |  HCT 29.7   |
|   | (L)         |
|   | 2019  |
|   |  .0  |
|   | (H)         |
|   | 2019  |
|   |  MCH 34.2   |
|   | (H)         |
|   | 2019  |
|   |  MCHC 32.5  |
|   | 2019  |
|   |  RDW 64.3   |
|   | (H)         |
|   | 2019  |
|   |      |
|   | (L)         |
|   | 2019  |
|   |  MPV 9.5    |
|   | 2019  |
|   |  DIFFTYPE   |
|   | MANUAL      |
|   | 2019  |
|   | CMP:  Lab   |
|   | Results     |
|   | Component   |
|   | Value Date  |
|   |       |
|   | 2019  |
|   |  K 4.4      |
|   | 2019  |
|   |       |
|   | 2019  |
|   |  CO2 28     |
|   | 2019  |
|   |  ANIONGAP   |
|   | 14          |
|   | 2019  |
|   |  GLUF 153   |
|   | (H)         |
 
|   | 2019  |
|   |  BUN 15     |
|   | 2019  |
|   |  CREATININE |
|   |  4.9 (H)    |
|   | 2019  |
|   |  BCR 3      |
|   | 2019  |
|   |  CA 9.6     |
|   | 2019  |
|   |  CA 8.7     |
|   | 2016  |
|   |  PROT 6.1   |
|   | (L)         |
|   | 2019  |
|   |  ALB 2.6    |
|   | (L)         |
|   | 2019  |
|   |  GLOB 3.5   |
|   | 2019  |
|   |  BILITOT    |
|   | 0.5         |
|   | 2019  |
|   |  ALP 85     |
|   | 2019  |
|   |  AST 22     |
|   | 2019  |
|   |  ALT 22     |
|   | 2019  |
|   |  EGFR 9 (L) |
|   |  2019 |
|   |  Lab        |
|   | Results     |
|   | Component   |
|   | Value Date  |
|   |  CKTOTAL 22 |
|   |  (L)        |
|   | 2019  |
|   |  CKMB 1.8   |
|   | 2019  |
|   |  CKMBINDEX  |
|   | 8.2         |
|   | 2019  |
|   |  TROPONINI  |
|   | 0.061       |
|   | 2019  |
|   | Disposition |
|   | :           |
|   | HomeConditi |
|   | on:         |
|   | StableCode  |
|   | Status:     |
|   | PriorDischa |
|   | rge         |
|   | Instruction |
|   | sDiet Renal |
|   |  Diet Low   |
|   | Sodium      |
|   | Activity as |
|   |  Advised by |
 
|   |  Physical   |
|   | Therapy     |
|   | Call MD     |
|   | for:        |
|   | Temperature |
|   |  > 100.4  F |
|   |  (38  C)    |
|   | Call MD     |
|   | for:        |
|   | Persistant  |
|   | Nausea and  |
|   | Vomiting    |
|   | Call MD     |
|   | for:        |
|   | Severe      |
|   | Uncontrolle |
|   | d Pain Call |
|   |  MD for:    |
|   | Redness,    |
|   | Tenderness, |
|   |  or Signs   |
|   | of          |
|   | Infection   |
|   | (Pain,      |
|   | Swelling,   |
|   | Redness,    |
|   | Odor or     |
|   | Green/Yello |
|   | w Discharge |
|   |  Around     |
|   | Incision    |
|   | Site) Call  |
|   | MD for:     |
|   | Hives Call  |
|   | MD for:     |
|   | Difficulty  |
|   | Breathing,  |
|   | Headache or |
|   |  Visual     |
|   | Disturbance |
|   | s Call MD   |
|   | for:        |
|   | Persistant  |
|   | Dizziness   |
|   | or          |
|   | Light-Heade |
|   | dness Call  |
|   | MD for:     |
|   | Extreme     |
|   | Fatigue     |
|   | Follow      |
|   | up:I[facility]   |
|   | Couch,      |
|   |  [address]     |
|   | [address],   |
|   | [place]         |
|   | [address] |
|   | [place]        |
|   | [id number] |
|   | [phone] |
 
|   | ule an      |
|   | appointment |
|   |  as soon as |
|   |  possible   |
|   | for a visit |
|   |  in 1       |
|   | weekJimmy   |
|   | [place],       |
|   | [id number]    |
|   | [address]  |
|   | [address] |
|   | [place]       |
|   | [id number] |
|   | [phone] |
|   | ule an      |
|   | appointment |
|   |  as soon as |
|   |  possible   |
|   | for a visit |
|   |  in 1       |
|   | weekBrian L |
|   |  [place],   |
|   | [id number]      |
|   | [address], |
|   |  [place]        |
|   | [address] |
|   |  [place]         |
|   | [id number] |
|   | [phone] |
|   | ule an      |
|   | appointment |
|   |  as soon as |
|   |  possible   |
|   | for a visit |
|   |  in 2       |
|   | weeksMershe |
|   | d [place], |
|   |  [id number]     |
|   | [address] |
|   |  [place]        |
|   | [place]   |
|   | [place]          |
|   | [id number] |
|   | [phone]    |
|   | needed      |
|   | Medication  |
|   | List        |
|   | CHANGE how  |
|   | you take    |
|   | these       |
|   | medications |
|   |             |
|   | carvedilol  |
|   | 12.5 MG     |
|   | tabletQTY:  |
|   |  60         |
|   | tabletRefil |
|   | ls:         |
|   | 0Doctor's   |
|   | comments:   |
 
|   | Hold for    |
|   | SBP less    |
|   | than        |
|   | 100Hold for |
|   |  HR less    |
|   | than        |
|   | 60Commonly  |
|   | known as:   |
|   | COREGTake 1 |
|   |  tablet by  |
|   | mouth 2     |
|   | (two) times |
|   |  daily with |
|   |  meals.What |
|   |  changed:   |
|   |  medication |
|   |  strength   |
|   |  how much   |
|   | to take     |
|   | LORazepam 1 |
|   |  MG         |
|   | tabletQTY:  |
|   |  30         |
|   | tabletRefil |
|   | ls:         |
|   | 0Commonly   |
|   | known as:   |
|   | ATIVANTake  |
|   | 1 tablet by |
|   |  mouth      |
|   | every 8     |
|   | (eight)     |
|   | hours as    |
|   | needed for  |
|   | Anxiety.Wha |
|   | t changed:  |
|   |  when to    |
|   | take this   |
|   | losartan 25 |
|   |  MG         |
|   | tabletQTY:  |
|   |  30         |
|   | tabletRefil |
|   | ls:         |
|   | 0Commonly   |
|   | known as:   |
|   | COZAARTake  |
|   | 1 tablet by |
|   |  mouth      |
|   | daily.What  |
|   | changed:    |
|   | how much to |
|   |  take       |
|   | CONTINUE    |
|   | taking      |
|   | these       |
|   | medications |
|   |   *         |
|   | albuterol   |
|   | 108 (90     |
 
|   | Base)       |
|   | MCG/ACT     |
|   | inhalerRefi |
|   | lls:        |
|   | 0Commonly   |
|   | known as:   |
|   | PROVENTIL   |
|   | HFA;VENTOLI |
|   | N HFA *     |
|   | VENTOLIN    |
|   |  (90 |
|   |  Base)      |
|   | MCG/ACT     |
|   | inhalerRefi |
|   | lls:        |
|   | 0Generic    |
|   | drug:       |
|   | albuterol   |
|   | apixaban    |
|   | 2.5 MG      |
|   | tabletQTY:  |
|   |  60         |
|   | tabletRefil |
|   | ls:         |
|   | 0Commonly   |
|   | known as:   |
|   | ELIQUISTake |
|   |  1 tablet   |
|   | by mouth 2  |
|   | (two) times |
|   |  daily.     |
|   | aspirin 81  |
|   | MG EC       |
|   | tabletQTY:  |
|   |  30         |
|   | tabletRefil |
|   | ls:  0Take  |
|   | 1 tablet by |
|   |  mouth      |
|   | daily with  |
|   | breakfast.  |
|   | atorvastati |
|   | n 40 MG     |
|   | tabletQTY:  |
|   |  30         |
|   | tabletRefil |
|   | ls:         |
|   | 0Commonly   |
|   | known as:   |
|   | LIPITORTake |
|   |  1 tablet   |
|   | by mouth    |
|   | nightly.    |
|   | calcium     |
|   | acetate 667 |
|   |  MG         |
|   | capsuleRefi |
|   | lls:        |
|   | 0Commonly   |
|   | known as:   |
 
|   | PHOSLO      |
|   | clopidogrel |
|   |  75 MG      |
|   | tabletQTY:  |
|   |  30         |
|   | tabletRefil |
|   | ls:         |
|   | 11Commonly  |
|   | known as:   |
|   | PLAVIXTake  |
|   | 1 tablet by |
|   |  mouth      |
|   | daily.      |
|   | esomeprazol |
|   | e 20 MG     |
|   | capsuleRefi |
|   | lls:        |
|   | 0Commonly   |
|   | known as:   |
|   | NEXIUM      |
|   | HYDROcodone |
|   | -acetaminop |
|   | hen 5-325   |
|   | MG per      |
|   | tabletQTY:  |
|   |  30         |
|   | tabletRefil |
|   | ls:         |
|   | 0Commonly   |
|   | known as:   |
|   | NORCOTake 1 |
|   |  tablet by  |
|   | mouth every |
|   |  4 (four)   |
|   | hours as    |
|   | needed.     |
|   | insulin     |
|   | aspart 100  |
|   | UNIT/ML     |
|   | injectionRe |
|   | fills:      |
|   | 0Commonly   |
|   | known as:   |
|   | NOVOLOG     |
|   | insulin     |
|   | degludec    |
|   | 100 UNIT/ML |
|   |             |
|   | injectionRe |
|   | fills:      |
|   | 0Commonly   |
|   | known as:   |
|   | TRESIBA     |
|   | isosorbide  |
|   | mononitrate |
|   |  60 MG 24   |
|   | hr          |
|   | tabletQTY:  |
|   |  30         |
|   | tabletRefil |
 
|   | ls:         |
|   | 1Commonly   |
|   | known as:   |
|   | IMDURTake 1 |
|   |  tablet by  |
|   | mouth       |
|   | daily.      |
|   | loperamide  |
|   | 2 MG        |
|   | capsuleRefi |
|   | lls:        |
|   | 0Commonly   |
|   | known as:   |
|   | IMODIUM     |
|   | nitroGLYCER |
|   | IN 0.4 MG   |
|   | SL          |
|   | tabletQTY:  |
|   |  30         |
|   | tabletRefil |
|   | ls:         |
|   | 0Doctor's   |
|   | comments:   |
|   | Hold for    |
|   | SBP less    |
|   | than        |
|   | 100Commonly |
|   |  known as:  |
|   |             |
|   | NITROSTATPl |
|   | ace 1       |
|   | tablet      |
|   | under the   |
|   | tongue      |
|   | every 5     |
|   | (five)      |
|   | minutes as  |
|   | needed for  |
|   | Chest pain. |
|   |             |
|   | nortriptyli |
|   | ne 25 MG    |
|   | capsuleRefi |
|   | lls:        |
|   | 0Commonly   |
|   | known as:   |
|   | PAMELOR     |
|   | ondansetron |
|   |  4 MG       |
|   | disintegrat |
|   | ing         |
|   | tabletRefil |
|   | ls:         |
|   | 0Commonly   |
|   | known as:   |
|   | ZOFRAN-ODT  |
|   | oxybutynin  |
|   | 5 MG        |
|   | tabletRefil |
|   | ls:         |
 
|   | 0Commonly   |
|   | known as:   |
|   | DITROPAN    |
|   | prochlorper |
|   | azine 5 MG  |
|   | tabletRefil |
|   | ls:  0      |
|   | ranitidine  |
|   | 150 MG      |
|   | tabletRefil |
|   | ls:         |
|   | 0Commonly   |
|   | known as:   |
|   | ZANTAC      |
|   | rOPINIRole  |
|   | 0.25 MG     |
|   | tabletRefil |
|   | ls:         |
|   | 0Commonly   |
|   | known as:   |
|   | REQUIP      |
|   | SLOW-MAG    |
|   | 71.5-119 MG |
|   |             |
|   | TbecRefills |
|   | :  0Generic |
|   |  drug:      |
|   | Magnesium   |
|   | Cl-Calcium  |
|   | Carbonate   |
|   | Vitamin D3  |
|   | 5000 units  |
|   | CapsRefills |
|   | :  0        |
|   | Vortioxetin |
|   | e HBr 10 MG |
|   |             |
|   | TabsRefills |
|   | :  0  *     |
|   | This list   |
|   | has 2       |
|   | medication( |
|   | s) that are |
|   |  the same   |
|   | as other    |
|   | medications |
|   |  prescribed |
|   |  for you.   |
|   | Read the    |
|   | directions  |
|   | carefully,  |
|   | and ask     |
|   | your doctor |
|   |  or other   |
|   | care        |
|   | provider to |
|   |  review     |
|   | them with   |
|   | you.    You |
|   |  might also |
 
|   |  be taking  |
|   | other       |
|   | medications |
|   |  not listed |
|   |  above. If  |
|   | you have    |
|   | questions   |
|   | about any   |
|   | of your     |
|   | other       |
|   | medications |
|   | , talk to   |
|   | the person  |
|   | who         |
|   | prescribed  |
|   | them or     |
|   | your        |
|   | Primary     |
|   | Care        |
|   | Provider.   |
|   |   STOP      |
|   | taking      |
|   | these       |
|   | medications |
|   |             |
|   | CefTAZidime |
|   |  and        |
|   | Dextrose    |
|   | 2-5         |
|   | GM-%(50ML)  |
|   | Solr        |
|   | furosemide  |
|   | 20 MG       |
|   | tabletCommo |
|   | nly known   |
|   | as:  LASIX  |
|   | vancomycin  |
|   | 1000 mg     |
|   | injectionCo |
|   | mmonly      |
|   | known as:   |
|   | VANCOCIN    |
|   | Where to    |
|   | Get Your    |
|   | Medications |
|   |   You can   |
|   | get these   |
|   | medications |
|   |  from any   |
|   | pharmacy    |
|   | Bring a     |
|   | paper       |
|   | prescriptio |
|   | n for each  |
|   | of these    |
|   | medications |
|   |             |
|   | carvedilol  |
|   | 12.5 MG     |
|   | tablet      |
 
|   | LORazepam 1 |
|   |  MG         |
|   | tablet      |
|   | nitroGLYCER |
|   | IN 0.4 MG   |
|   | SL tablet   |
|   | Discharge   |
|   | took over   |
|   | 30          |
|   | minutes, to |
|   |  include    |
|   | final       |
|   | examination |
|   | ,           |
|   | discussion  |
|   | of          |
|   | admission,  |
|   | and         |
|   | preparation |
|   |  of         |
|   | prescriptio |
|   | ns,         |
|   | instruction |
|   | s for       |
|   | on-going    |
|   | care,       |
|   | follow-up   |
|   | and         |
|   | documentati |
|   | on of       |
|   | discharge   |
|   | summary.Christian |
|   | alexandra Ferrara,  |
|   | MD2019 |
+---+-------------+
 
 
 
+--------+-------------+---+----------------------+----------------------+
| 01/15/ | Documentati |   |   Rl,         | Other (Diagnosis     |
|    | on Only     |   | CHELSEY Hays         | request sent to      |
|        |             |   |                      | [facility] and           |
|        |             |   |                      | confirmation)        |
+--------+-------------+---+----------------------+----------------------+
| / | Documentati |   |   João Asher MD  | Other (December      |
2019   | on Only     |   |                      | [facility] |
|        |             |   |                      |  Dialysis Rounding   |
|        |             |   |                      | Note)                |
+--------+-------------+---+----------------------+----------------------+
| 01/10/ | Office      |   |   Srinivasan Abdi,  | S/P amputation of    |
2019   | Visit       |   | DPM                  | foot, left (HCC)     |
|        |             |   |                      | (Primary Dx); Type 2 |
|        |             |   |                      |  diabetes mellitus   |
|        |             |   |                      | with chronic kidney  |
|        |             |   |                      | disease on chronic   |
|        |             |   |                      | dialysis, with       |
|        |             |   |                      | long-term current    |
|        |             |   |                      | use of insulin (HCC) |
+--------+-------------+---+----------------------+----------------------+
| / | Telephone   |   |   Srinivasan Abdi,  | Follow-up (schedule  |
 
2019   |             |   | DPM                  | appointment)         |
+--------+-------------+---+----------------------+----------------------+
| / | Telephone   |   |   Srinivasan Abdi,  | Other                |
|    |             |   | DPM                  | (caregiver/Rehab)    |
+--------+-------------+---+----------------------+----------------------+
| / | Hospital    |   |   Srinivasan Abdi,  | Acute osteomyelitis  |
| 2018 - | Encounter   |   | DPM  Moiz Josue | of left ankle or     |
|        |             |   |  MD Melisa Porter,    | foot ([facility]);          |
| / |             |   | Winston, DO            | Prophylactic         |
| 2019   |             |   |                      | antibiotic; Anemia   |
|        |             |   |                      | in ESRD (end-stage   |
|        |             |   |                      | renal disease) ([facility]) |
+--------+-------------+---+----------------------+----------------------+
 
 
 
+---+-------------+
|   |   Discharge |
|   |  Summaries  |
|   | - Melisa,     |
|   | DO Winston - |
|   |  2019 |
|   |   1:12 PM   |
|   | PST         |
|   | Formatting  |
|   | of this     |
|   | note may be |
|   |  different  |
|   | from the    |
|   | original.[facility] |
|   | [facility]        |
[facility]   [facility]
|   [facility]     [facility]
|   [facility] |
|   | ce:         |
|   | Hospitalist |
|   | Physician   |
|   | Discharge   |
|   | Summary     |
|   | Patient     |
|   | ID:Cherie    |
|   | Apple     |
|   | CardenMRN:  |
|   | [id number]/ |
|   | [id number]    |
|   | y.o.Admit   |
|   | date:       |
|   | 2018D |
|   | ischarge    |
|   | date:       |
|   | 2019Adm |
|   | itting      |
|   | Physician:  |
|   | Srinivasan APONTE     |
|   | Julian,    |
|   | DPM         |
|   | Discharge   |
|   | Physician:  |
|   | Winston Vincent |
|   |             |
 
|   | DOConsultan |
|   | ts:         |
|   | Treatment   |
|   | Team:       |
|   | Consulting  |
|   | Physician:  |
|   | João H      |
|   | Akoum,      |
|   | MDConsultin |
|   | g           |
|   | Physician:  |
|   | Porntip     |
|   | Kiatsimkul, |
|   |             |
|   | MDConsultin |
|   | g           |
|   | Physician:  |
|   | Oumar      |
|   | Kisha,      |
|   | DPMConsulti |
|   | ng          |
|   | Physician:  |
|   | Margarita     |
|   | Luz,       |
|   | DOAdmitting |
|   |  Provider:  |
|   | Srinivasan L     |
|   | Abdi,    |
|   | DPMPrimary  |
|   | Discharge   |
|   | Diagnoses:  |
|   | Principal   |
|   | Problem:    |
|   | Osteomyelit |
|   | is of left  |
|   | foot        |
|   | (HCC)Active |
|   |  Problems:  |
|   |  Secondary  |
|   | hyperparath |
|   | yroidism    |
|   | (HCC)       |
|   | Depression  |
|   |  ESRD (end  |
|   | stage renal |
|   |  disease)   |
|   | (HCC)  Type |
|   |  2 diabetes |
|   |  mellitus   |
|   | with        |
|   | chronic     |
|   | kidney      |
|   | disease on  |
|   | chronic     |
|   | dialysis,   |
|   | with        |
|   | long-term   |
|   | current use |
|   |  of insulin |
|   |  (HCC)      |
 
|   | Anemia in   |
|   | ESRD        |
|   | (end-stage  |
|   | renal       |
|   | disease)    |
|   | (HCC)       |
|   | Hypertensio |
|   | n,          |
|   | essential   |
|   | Diabetic    |
|   | foot ulcer  |
|   | (HCC)       |
|   | Chronic     |
|   | systolic    |
|   | congestive  |
|   | heart       |
|   | failure     |
|   | (HCC)       |
|   | Chronic     |
|   | anticoagula |
|   | tion        |
|   | Cardiomyopa |
|   | thy (HCC)   |
|   | Paroxysmal  |
|   | atrial      |
|   | fibrillatio |
|   | n (HCC)     |
|   | S/P CABG x  |
|   | 2  S/P      |
|   | mitral      |
|   | valve       |
|   | repair  PVD |
|   |             |
|   | (peripheral |
|   |  vascular   |
|   | disease)    |
|   | (HCC)  CAD  |
|   | (coronary   |
|   | artery      |
|   | disease)Res |
|   | olved       |
|   | Problems:   |
|   | * No        |
|   | resolved    |
|   | hospital    |
|   | problems.   |
|   | *HPI (from  |
|   | h/p on      |
|   | /The   |
|   | patient is  |
|   | a 68 y.o.   |
|   | female with |
|   |             |
|   | significant |
|   |  past       |
|   | medical     |
|   | history of  |
|   |  ESRD on HD |
|   |  every TTS, |
|   |  type 2 DM, |
 
|   |  HTN, PAD   |
|   | s/p         |
|   | angioplasty |
|   |  LLE, CAD   |
|   | s/pMI,      |
|   | CABG, s/p   |
|   | AVR in      |
|   | 2018,     |
|   | HFrEF with  |
|   | last EF of  |
|   | 20-25%, s/p |
|   |  AICD       |
|   | placement,  |
|   | AF on       |
|   | chronic     |
|   | anticoagula |
|   | tion,  was  |
|   | admitted    |
|   | for         |
|   | transmetata |
|   | rsal        |
|   | amputation  |
|   | for         |
|   | osteomyelit |
|   | is of the   |
|   | left 1st    |
|   | MT.  She    |
|   | has had     |
|   | chronic     |
|   | osteomyelit |
|   | is of the   |
|   | first       |
|   | metatarsal  |
|   | and needing |
|   |             |
|   | amputation. |
|   |  It was     |
|   | postponed   |
|   | until       |
|   | angioplasty |
|   |  of her LLE |
|   |  was done   |
|   | to optimize |
|   |  would      |
|   | healing.    |
|   | She         |
|   | underwent   |
|   | amputation  |
|   | today. EBL  |
|   | was <       |
|   | 200ml.  Per |
|   |  Dr.        |
|   | Abdi     |
|   | there was   |
|   | concern for |
|   |  residual   |
|   | bone        |
|   | infection.  |
|   | He          |
|   | consulted   |
 
|   | ID who      |
|   | suggested   |
|   | cefepime    |
|   | 2gm post    |
|   | HD. Patient |
|   |  was seen   |
|   | today at    |
|   | bedside,    |
|   | denies      |
|   | having      |
|   | chest pain, |
|   |  SOB,       |
|   | lighhteaded |
|   | ness,       |
|   | tolerating  |
|   | clear       |
|   | liquid      |
|   | diet.       |
|   | Hospital    |
|   | Course:     |
|   | Patient was |
|   |  admitted   |
|   | to the      |
|   | hosptialsis |
|   | t service   |
|   | for further |
|   |  management |
|   |  of         |
|   | patient's   |
|   | non-healing |
|   |  foot       |
|   | ulcer.      |
|   | Antibiotics |
|   |  were       |
|   | managed by  |
|   | ID,         |
|   | Podiatry    |
|   | was         |
|   | consulted   |
|   | and         |
|   | evaluated   |
|   | patient and |
|   |  took       |
|   | patient for |
|   |  left foot  |
|   | transmetata |
|   | rsal        |
|   | amputation. |
|   |  Patient    |
|   | tolerated   |
|   | procedure   |
|   | well.       |
|   | Nephrology  |
|   | was         |
|   | consulted   |
|   | and managed |
|   |  patient's  |
|   | ESRD/dialys |
|   | is. During  |
|   | the         |
 
|   | patient's   |
|   | stay she    |
|   | developed   |
|   | chest pain  |
|   | that was    |
|   | resolved by |
|   |             |
|   | nitroglycer |
|   | in.         |
|   | Cardiology  |
|   | was         |
|   | consulted,  |
|   | however     |
|   | there was   |
|   | not any     |
|   | interventio |
|   | ns besides  |
|   | medical     |
|   | management  |
|   | that the    |
|   | patient was |
|   |  a          |
|   | candidate   |
|   | for. Upon   |
|   | record      |
|   | review she  |
|   | had a       |
|   | stress test |
|   |  the month  |
|   | prior that  |
|   | was found   |
|   | to be       |
|   | negative.   |
|   | Patient     |
|   | remained    |
|   | stable and  |
|   | was         |
|   | subsequentl |
|   | y           |
|   | discharged  |
|   | to SNF for  |
|   | rehab and   |
|   | continued   |
|   | IV          |
|   | antibiotics |
|   | . She was   |
|   | given       |
|   | instruction |
|   | s to follow |
|   |  up with    |
|   | nephrology, |
|   |  ID,        |
|   | cardiology, |
|   |  Primary    |
|   | care, and   |
|   | podiatry.   |
|   | Past        |
|   | Medical     |
|   | History:    |
|   | Past        |
 
|   | Medical     |
|   | History     |
|   | Diagnosis   |
|   | Date        |
|   |   Acute MI  |
|   | (HCC) 2003  |
|   |  with       |
|   | stenting x  |
|   | 1           |
|   |   Anemia    |
|   | associated  |
|   | with        |
|   | chronic     |
|   | renal       |
|   | failure     |
|   | 2016   |
|   |             |
|   |   Atrial    |
|   | fibrillatio |
|   | n (HCC)     |
|   |   Chronic   |
|   | kidney      |
|   | disease     |
|   |             |
|   | Congestive  |
|   | heart       |
|   | failure     |
|   | (HCC)       |
|   |   COPD      |
|   | (chronic    |
|   | obstructive |
|   |  pulmonary  |
|   | disease)    |
|   | (HCC)       |
|   |   COPD      |
|   | (chronic    |
|   | obstructive |
|   |  pulmonary  |
|   | disease)    |
|   | (HCC)       |
|   | 2018   |
|   |             |
|   |   Coronary  |
|   | artery      |
|   | disease     |
|   | stent       |
|   | placements: |
|   |  3 total    |
|   |             |
|   | Depression  |
|   |             |
|   |   Diabetes  |
|   | other       |
|   | abstracted  |
|   |             |
|   |   Diabetes  |
|   | mellitus,   |
|   | type 2      |
|   | (HCC)       |
|   |   ESRD on   |
 
|   | peritoneal  |
|   | dialysis    |
|   | (HCC)       |
|   |   GERD      |
|   | (gastroesop |
|   | hageal      |
|   | reflux      |
|   | disease)    |
|   | 1992        |
|   |             |
|   | Hemodialysi |
|   | s patient   |
|   | (HCC)       |
|   | 10/2016     |
|   | Stopped     |
|   | peritoneal  |
|   | and         |
|   | restarted   |
|   | hemodialysi |
|   | s           |
|   |             |
|   | Hemorrhage  |
|   | of          |
|   | gastrointes |
|   | tinal       |
|   | tract,      |
|   | unspecified |
|   |  2017    |
|   | low GI,     |
|   | rectal      |
|   | bleeding    |
|   |   High      |
|   | blood       |
|   | pressure    |
|   | other       |
|   | abstracted  |
|   |             |
|   |   High      |
|   | cholesterol |
|   |  other      |
|   | abstracted  |
|   |             |
|   |             |
|   | Hyperlipide |
|   | mark         |
|   |             |
|   | Hypertensio |
|   | n           |
|   |             |
|   | Hyponatremi |
|   | a 2017  |
|   |             |
|   |             |
|   | Myocardial  |
|   | infarction  |
|   | (HCC)       |
|   | 2017     |
|   |   Other     |
|   | chronic     |
|   | pain        |
 
|   | feet,       |
|   |   Pain of   |
|   | left hip    |
|   | joint       |
|   | 2016  |
|   |  due to hip |
|   |  fracture   |
|   | and         |
|   | replacement |
|   |             |
|   |   Partial   |
|   | small bowel |
|   |             |
|   | obstruction |
|   |  (HCC)      |
|   | 2017  |
|   |  resolved   |
|   |             |
|   |   Renal     |
|   | failure     |
|   | Stage 5.    |
|   | Fistula in  |
|   | the JAN -   |
|   | not on      |
|   | dialysis    |
|   | yet.        |
|   |             |
|   | Unspecified |
|   |  visual     |
|   | disturbance |
|   |    glasses  |
|   | Past        |
|   | Surgical    |
|   | History     |
|   | Procedure   |
|   | Laterality  |
|   | Date        |
|   |   AV        |
|   | FISTULA     |
|   | PLACEMENT   |
|   |   left  |
|   | upper arm   |
|   | AV fistula  |
|   | - failed    |
|   |   AV        |
|   | FISTULA     |
|   | REPAIR N/A  |
|   | 10/25/2016  |
|   |  Procedure: |
|   |  AV FISTULA |
|   |  - GRAFT    |
|   | REPAIR/REVI |
|   | PRANEETH;       |
|   | Surgeon:    |
|   | Kirt Mclaughlin,  |
|   | MD;         |
|   | Location:   |
|   | [facility]   |
|   | OR;         |
|   | Service:    |
 
|   | Vascular;   |
|   | Laterality: |
|   |  N/A;       |
|   | Superficial |
|   | ization and |
|   |  revision   |
|   | of left arm |
|   |  fistula    |
|   |   CARDIAC   |
|   | CATHETERIZA |
|   | TION        |
|   | 2017  |
|   |             |
|   |   CARPAL    |
|   | TUNNEL      |
|   | RELEASE     |
|   | Bilateral   |
|   | other       |
|   | abstracted  |
|   |             |
|   |   CATARACT  |
|   | EXTRACTION  |
|   | Bilateral   |
|   |         |
|   |   CATHETER  |
|   | REMOVAL N/A |
|   |  2016 |
|   |             |
|   | Procedure:  |
|   | DIALYSIS    |
|   | CATHETER -  |
|   | REMOVAL;    |
|   | Surgeon:    |
|   | Kirt Y Arnoldo,  |
|   | MD;         |
|   | Location:   |
|   | [facility]   |
|   | OR;         |
|   | Service:    |
|   | Vascular;   |
|   | Laterality: |
|   |  N/A;  PD   |
|   | cath        |
|   | removal     |
|   |             |
|   | CHOLECYSTEC |
|   | FELY  other |
|   |             |
|   | abstracted  |
|   |             |
|   |             |
|   | COLONOSCOPY |
|   |             |
|   |   CORONARY  |
|   | ARTERY      |
|   | BYPASS      |
|   | GRAFT       |
|   |   CORONARY  |
|   | STENT       |
|   | PLACEMENT   |
 
|   | 2017  |
|   |  Drug       |
|   | Elutin |
|   |  stents to  |
|   | OM, 1 stent |
|   |  to prox.   |
|   | LAD         |
|   |   EYE       |
|   | SURGERY     |
|   |             |
|   |   FOOT      |
|   | AMPUTATION  |
|   | Left        |
|   | 2018  |
|   |  Procedure: |
|   |  FOREFOOT - |
|   |             |
|   | AMPUTATION; |
|   |   Surgeon:  |
|   | Srinivasan L     |
|   | Abdi,    |
|   | DPM;        |
|   | Location:   |
|   | [facility]   |
|   | OR;         |
|   | Service:    |
|   | Podiatry;   |
|   | Laterality: |
|   |  Left;      |
|   |   HARDWARE  |
|   | PRESENT     |
|   | stent       |
|   | placements  |
|   | x 3, Left   |
|   | hip         |
|   | replacement |
|   | , vascular  |
|   | stents 2 in |
|   |  left leg   |
|   |             |
|   |   HIP       |
|   | ARTHROPLAST |
|   | Y Left      |
|   | 2016   |
|   | Procedure:  |
|   | HIP -       |
|   | ARTHROPLAST |
|   | Y(ALLISON);    |
|   | Surgeon:    |
|   | Uriel  |
|   | MD Crispin;  |
|   |  Location:  |
|   | [facility]   |
|   | OR;         |
|   | Service:    |
|   | Orthopedics |
|   | ;           |
|   | Laterality: |
|   |  Left;      |
|   |             |
 
|   | HYSTERECTOM |
|   | Y  1998     |
|   | complete    |
|   |   PACEMAKER |
|   |  INSERTION  |
|   |    [place]   |
|   | scientific  |
|   | pacemaker/d |
|   | efib        |
|   |             |
|   | PERITONEAL  |
|   | CATHETER    |
|   | INSERTION   |
|   | 8/15/2013   |
|   |             |
|   |             |
|   | PERITONEAL  |
|   | CATHETER    |
|   | INSERTION   |
|   | N/A         |
|   | 2016   |
|   | Procedure:  |
|   | LAPAROSCOPI |
|   | C -         |
|   | PERITONEAL  |
|   | DIALYSIS    |
|   | CATH        |
|   | INSERTION;  |
|   |  Surgeon:   |
|   | Kirt Y Arnoldo,  |
|   | MD;         |
|   | Location:   |
|   | [facility]   |
|   | OR;         |
|   | Service:    |
|   | Vascular;   |
|   | Laterality: |
|   |  N/A;       |
|   | Removal of  |
|   | old         |
|   | catheter    |
|   |   SHOULDER  |
|   | SURGERY     |
|   | Right 1980  |
|   |  frozen     |
|   | shoulder    |
|   |   UNLISTED  |
|   | PROCEDURE   |
|   | ARTHROSCOPY |
|   |     total   |
|   | Left hip    |
|   |   vascular  |
|   | stents Left |
|   |  2017    |
|   | leg (2      |
|   | stents)     |
|   |   VASCULAR  |
|   | SURGERY     |
|   | Discharged  |
|   | Condition:  |
 
|   | Stable for  |
|   | discharge   |
|   | as stated   |
|   | above.Signi |
|   | ficant      |
|   | Diagnostic  |
|   | Studies:Ct  |
|   | Head        |
|   | Without     |
|   | ContrastRes |
|   | ult Date:   |
|   | 2018L |
|   | MADELINE        |
|   | APPLE     |
|   | WILFREDO      |
|   | 1950 68 |
|   |  years      |
|   | Female CT   |
|   | HEAD WO     |
|   | CONTRAST    |
|   | 2018  |
|   | 5:23 PM     |
|   | INDICATION: |
|   |  Altered    |
|   | mental      |
|   | status in a |
|   |  patient    |
|   | with acute  |
|   | osteomyelit |
|   | is          |
|   | COMPARISON: |
|   |  Head CT,   |
|   | 2017   |
|   | TECHNIQUE:  |
|   | CT scan of  |
|   | the head    |
|   | without     |
|   | contrast.   |
|   | 5-mm axial  |
|   | noncontrast |
|   |  images     |
|   | were        |
|   | acquired    |
|   | from the    |
|   | foramen     |
|   | magnum      |
|   | through the |
|   |  cranial    |
|   | vertex.     |
|   | Multiplanar |
|   |             |
|   | reformation |
|   | s were      |
|   | obtained    |
|   | from the    |
|   | acquisition |
|   |  data. At   |
|   | least one   |
|   | of the      |
|   | following   |
 
|   | CT dose     |
|   | optimizatio |
|   | n           |
|   | techniques  |
|   | were used:  |
|   | Automated   |
|   | exposure    |
|   | control;    |
|   | Adjustment  |
|   | of mA       |
|   | and/or kV   |
|   | according   |
|   | to patient  |
|   | size; Use   |
|   | of          |
|   | iterative   |
|   | reconstruct |
|   | ion         |
|   | technique.  |
|   | FINDINGS:   |
|   | Brain: No   |
|   | intracrania |
|   | l           |
|   | hemorrhage, |
|   |  midline    |
|   | shift or    |
|   | pathologic  |
|   | mass        |
|   | effect. No  |
|   | cerebral    |
|   | edema, mass |
|   |  lesion or  |
|   | evidence of |
|   |  acute      |
|   | infarct.    |
|   | Ventricles  |
|   | and         |
|   | extra-axial |
|   |  fluid      |
|   | spaces:     |
|   | Normal.     |
|   | Paranasal   |
|   | sinuses and |
|   |  mastoid    |
|   | air cells:  |
|   | Normal.     |
|   | Calvarium   |
|   | and         |
|   | extracrania |
|   | l soft      |
|   | tissues:    |
|   | Normal.     |
|   | Orbits: The |
|   |  patient is |
|   |  status     |
|   | post        |
|   | bilateral   |
|   | cataract    |
|   | surgery. 1. |
|   |   No acute  |
 
|   | intracrania |
|   | l           |
|   | pathology.  |
|   | Electronica |
|   | lly signed  |
|   | by Steven  |
|   | P Samano,   |
|   | MD on       |
|   | 2018  |
|   | 5:28        |
|   | PMX-ray     |
|   | Chest 1     |
|   | ViewResult  |
|   | Date:       |
|   | 2019LIN |
|   | [facility] APPLE  |
|   | WILFREDO XR   |
|   | CHEST 1     |
|   | VIEW        |
|   | 2019    |
|   | 4:43 PM     |
|   | HISTORY: 68 |
|   |  years.     |
|   | Female.     |
|   | Chest pain. |
|   |  TECHNIQUE: |
|   |  1 view of  |
|   | the chest   |
|   | obtained at |
|   |  1637       |
|   | hours.      |
|   | COMPARISON: |
|   |             |
|   | 2018. |
|   |  FINDINGS:  |
|   | A single    |
|   | lead ICD is |
|   |  seen over  |
|   | the right   |
|   | chest with  |
|   | an intact   |
|   | lead in     |
|   | proper      |
|   | position    |
|   | unchanged   |
|   | from the    |
|   | previous    |
|   | exam.  The  |
|   | sternotomy  |
|   | wires from  |
|   | prior CABG  |
|   | are noted.  |
|   |  An         |
|   | overlying   |
|   | EKG leads   |
|   | is seen.  A |
|   |  presumed   |
|   | loculated   |
|   | pleural     |
|   | fluid       |
 
|   | collection  |
|   | is seen     |
|   | overlying   |
|   | the left    |
|   | heart       |
|   | border      |
|   | similar to  |
|   | the         |
|   | previous    |
|   | exam.  This |
|   |  measures   |
|   | 3.0 x 5.2   |
|   | cm in the   |
|   | transverse  |
|   | and         |
|   | craniocauda |
|   | l           |
|   | dimensions. |
|   |   Hazy      |
|   | opacity is  |
|   | seen in the |
|   |  lower      |
|   | right chest |
|   |  which may  |
|   | indicate    |
|   | the         |
|   | presence of |
|   |  a          |
|   | right-sided |
|   |  pleural    |
|   | effusion    |
|   | which is    |
|   | layering    |
|   | posteriorly |
|   | .  No acute |
|   |  airspace   |
|   | disease,    |
|   | parenchymal |
|   |  nodule,    |
|   | mass or     |
|   | pneumothora |
|   | x is noted. |
|   |   Apical    |
|   | pleural     |
|   | thickening  |
|   | is seen.    |
|   | The osseous |
|   |  structures |
|   |  are        |
|   | intact. 1.  |
|   |  Small      |
|   | loculated   |
|   | pleural     |
|   | fluid       |
|   | collection  |
|   | seen        |
|   | overlying   |
|   | the lateral |
|   |  left heart |
|   |  border in  |
 
|   | the lower   |
|   | left chest. |
|   |   There may |
|   |  also be a  |
|   | small       |
|   | pleural     |
|   | effusion at |
|   |  the right  |
|   | lung base   |
|   | which is    |
|   | layering    |
|   | posteriorly |
|   | . 2.        |
|   | Single lead |
|   |  ICD and    |
|   | prior CABG  |
|   | are noted.  |
|   | Electronica |
|   | lly signed  |
|   | by Edward   |
|   | Iuliano, DO |
|   |  on         |
|   | 2019    |
|   | 4:59        |
|   | PMDischarge |
|   |             |
|   | Vitals:Milli |
|   | ls:         |
|   | 19    |
|   | 2301        |
|   | 19    |
|   | 0405        |
|   | 19    |
|   | 0720        |
|   | 19    |
|   | 1121 BP:    |
|   | 97/48       |
|   | 113/54      |
|   | 127/61      |
|   | 125/60 BP   |
|   | Location:   |
|   | Right upper |
|   |  arm Right  |
|   | upper arm   |
|   | Right upper |
|   |  arm Right  |
|   | upper arm   |
|   | Pulse: 68   |
|   | 68 80 76    |
|   | Resp: 18 18 |
|   |  16 16      |
|   | Temp: 99    |
|   |   F (37.2   |
|   |   C) 98.3   |
|   |   F (36.8   |
|   |   C) 98.7   |
|   |   F (37.1   |
|   |   C) 98.3   |
|   |   F (36.8   |
|   |   C)        |
 
|   | TempSrc:    |
|   | Oral Oral   |
|   | Oral Oral   |
|   | SpO2:  97%  |
|   | 99% 100%    |
|   | Weight:     |
|   |  Height:    |
|   |   Discharge |
|   |             |
|   | Exam:Genera |
|   | l           |
|   | Appearance: |
|   |     A & O x |
|   |  3, no      |
|   | distress,   |
|   | appears     |
|   | stated age  |
|   | Head:       |
|   | Normocephal |
|   | ic, without |
|   |  obvious    |
|   | abnormality |
|   | ,           |
|   | atraumatic  |
|   | Eyes:       |
|   | PERRL,      |
|   | conjunctiva |
|   | /corneas    |
|   | clear,      |
|   | EOM's       |
|   | intact.     |
|   | Ears:       |
|   | Normal      |
|   | external    |
|   | ear canals, |
|   |  no         |
|   | otorrhea    |
|   | Nose:       |
|   | Nares       |
|   | normal, no  |
|   | drainage or |
|   |  sinus      |
|   | tenderness  |
|   | Throat:     |
|   | Lips,       |
|   | mucosa, and |
|   |  tongue     |
|   | normal;     |
|   | gums normal |
|   |  Neck:      |
|   | Supple,     |
|   | symmetrical |
|   | , trachea;  |
|   | no carotid  |
|   | bruit or    |
|   | JVD Back:   |
|   |             |
|   | Symmetric,  |
|   | no          |
|   | curvature,  |
 
|   | ROM normal, |
|   |  no CVA     |
|   | tenderness  |
|   | Lungs:      |
|   | Clear to    |
|   | auscultatio |
|   | n           |
|   | bilaterally |
|   | ,           |
|   | respiration |
|   | s unlabored |
|   |  Chest      |
|   | Wall:    No |
|   |  tenderness |
|   |  or         |
|   | deformity   |
|   | Heart:      |
|   | Regular     |
|   | rate and    |
|   | rhythm, S1  |
|   | and S2      |
|   | normal, no  |
|   | murmur, rub |
|   |    or       |
|   | gallop      |
|   | Abdomen:    |
|   |   Soft,     |
|   | non-tender, |
|   |  bowel      |
|   | sounds      |
|   | active all  |
|   | four        |
|   | quadrants,  |
|   | no masses,  |
|   | no          |
|   | organomegal |
|   | y           |
|   | Genitalia:  |
|   |    Deferred |
|   |  Rectal:    |
|   |  Deferred   |
|   | Extremities |
|   | :   Upper   |
|   | extremities |
|   |  no         |
|   | clubbing/   |
|   | cyanosis/   |
|   | erythema    |
|   | Lower       |
|   | extremities |
|   |             |
|   | atraumatic, |
|   |  no         |
|   | cyanosis or |
|   |  edemaL     |
|   | foot        |
|   | Transmetata |
|   | rsal        |
|   | amputation  |
|   | - dressing  |
 
|   | in place.   |
|   | Pulses:     |
|   | 2+ and      |
|   | symmetric   |
|   | all         |
|   | extremities |
|   |  Skin:      |
|   | Skin warm,  |
|   | texture and |
|   |  turgor     |
|   | normal, no  |
|   | rashes or   |
|   | lesions     |
|   | Lymph       |
|   | nodes:   No |
|   |  gross      |
|   | lymphadenop |
|   | athy.       |
|   | Neurologic: |
|   | Psychiatric |
|   | :           |
|   | CNII-XII    |
|   | intact,     |
|   | normal      |
|   | strength,   |
|   | sensation   |
|   | normal      |
|   | Affect/     |
|   | mood        |
|   | normal,     |
|   | behavior    |
|   | and         |
|   | judgement   |
|   | normal      |
|   | LABS:       |
|   | Recent      |
|   | LabsLab     |
|   |  |
|   | 8           |
|   |  |
|   | 5           |
|   |  |
|   | 0 WBC 5.21  |
|   | 5.22 6.13   |
|   | HGB 8.7*    |
|   | 8.4* 8.7*   |
|   | HCT 26.3*   |
|   | 25.7* 27.0* |
|   |      |
|   | 149* 156    |
|   | NEUTOPHILPC |
|   | T 69.68     |
|   | 72.47 71.63 |
|   |  MONOPCT    |
|   | 9.79 8.92   |
|   | 12.18       |
|   | Recent      |
|   | LabsLab     |
|   |  |
|   | 8           |
 
|   |  |
|   | 5           |
|   |  |
|   | 0     |
|   | 138 136 K   |
|   | 4.5 4.5 4.8 |
|   |  CL 99 100  |
|   | 98* CO2 31  |
|   | 29 26 BUN   |
|   | 13 21 29*   |
|   | CREATININE  |
|   | 3.5* 4.5*   |
|   | 6.7* PROT   |
|   | 6.3 5.9*    |
|   | 6.4 BILITOT |
|   |  0.4 0.3    |
|   | 0.4 ALT 17  |
|   | 16 17 AST   |
|   | 23 21 24    |
|   | Phosphorus: |
|   |   No        |
|   | results for |
|   |  input(s):  |
|   | PHOS in the |
|   |  last 168   |
|   | hours.No    |
|   | results for |
|   |  input(s):  |
|   | MG in the   |
|   | last 168    |
|   | hours.No    |
|   | results for |
|   |  input(s):  |
|   | AMYLASE in  |
|   | the last    |
|   | 168         |
|   | hours.No    |
|   | results for |
|   |  input(s):  |
|   | LIPASE in   |
|   | the last    |
|   | 168         |
|   | hours.No    |
|   | results for |
|   |  input(s):  |
|   | PHART,      |
|   | PO2ART,     |
|   | [id number],    |
|   | D2WHUFSS,   |
|   | BEART in    |
|   | the last    |
|   | 168         |
|   | hours.No    |
|   | results for |
|   |  input(s):  |
|   | APTT, INR,  |
|   | PTT in the  |
|   | last 168    |
|   | hours.No    |
|   | results for |
 
|   |  input(s):  |
|   | TSH,        |
|   | T3FREE,     |
|   | FREET4 in   |
|   | the last    |
|   | 168         |
|   | hours.Recen |
|   | t LabsLab   |
|   |  |
|   | 5           |
|   |  |
|   | 0           |
|   |  |
|   | 0           |
|   |  |
|   | 2 CKTOTAL   |
|   | --   --     |
|   | 22* 23*     |
|   | TROPONINI   |
|   | 0.256*      |
|   | 0.274*      |
|   | 0.318*  --  |
|   |  CKMBINDEX  |
|   |  --   --    |
|   | 8.2  --     |
|   | Disposition |
|   | :  SNFNo    |
|   | discharge   |
|   | procedures  |
|   | on          |
|   | file.Follow |
|   |  up:Ivy  |
|   | Couch,      |
|   |  [address]     |
|   | [address],   |
|   | [place]         |
|   | [address] |
|   | [place]        |
|   | [id number] |
|   | [phone] |
|   |  L Abdi, |
|   |  [id number]     |
|   | [address], |
|   |  [place]        |
|   | [address] |
|   |  [place]         |
|   | [phone] |
|   | [phone] |
|   |  daysJimmy  |
|   | Earl,       |
|   | [id number]    |
|   | [address]  |
|   | [address] |
|   | c[place]       |
|   | [id number] |
|   | [phone] |
|   | i Couch     |
|   | Medication  |
|   | List  START |
|   |  taking     |
 
|   | these       |
|   | medications |
|   |             |
|   | CefTAZidime |
|   |  and        |
|   | Dextrose    |
|   | 2-5         |
|   | GM-%(50ML)  |
|   | SolrQTY:    |
|   | 30          |
|   | eachRefills |
|   | :           |
|   | 2Ceftazidim |
|   | e 2 gm iv   |
|   | after each  |
|   | hemodialysi |
|   | s until     |
|   | 19     |
|   | vancomycin  |
|   | 1000 mg     |
|   | injectionQT |
|   | Y:  7       |
|   | eachRefills |
|   | :           |
|   | 3Commonly   |
|   | known as:   |
|   | [id number] |
|   |  mg iv      |
|   | after each  |
|   | hemodialysi |
|   | s until     |
|   | 19 Cbc |
|   |  cmp esr    |
|   | crp         |
|   | vancomycin  |
|   | trough  q   |
|   | Monday      |
|   | Follow up   |
|   | with DR.    |
|   | Earl in 2   |
|   | weeks Call  |
|   | Tel         |
|   | [phone] |
|   | 2 to        |
|   | schedule    |
|   | follow up   |
|   | Fax lab     |
|   | results to  |
|   | [phone] |
|   | 1  CHANGE   |
|   | how you     |
|   | take these  |
|   | medications |
|   |             |
|   | atorvastati |
|   | n 40 MG     |
|   | tabletQTY:  |
|   |  30         |
|   | tabletRefil |
|   | ls:         |
 
|   | 0Commonly   |
|   | known as:   |
|   | LIPITORTake |
|   |  1 tablet   |
|   | by mouth    |
|   | nightly.Wha |
|   | t           |
|   | changed:    |
|   | medication  |
|   | strength    |
|   | how much to |
|   |  take   how |
|   |  to take    |
|   | this        |
|   | losartan 25 |
|   |  MG         |
|   | tabletQTY:  |
|   |  30         |
|   | tabletRefil |
|   | ls:         |
|   | 0Commonly   |
|   | known as:   |
|   | COZAARTake  |
|   | 1 tablet by |
|   |  mouth      |
|   | daily.What  |
|   | changed:    |
|   | how much to |
|   |  take       |
|   | CONTINUE    |
|   | taking      |
|   | these       |
|   | medications |
|   |   albuterol |
|   |  108 (90    |
|   | Base)       |
|   | MCG/ACT     |
|   | inhalerRefi |
|   | lls:        |
|   | 0Commonly   |
|   | known as:   |
|   | PROVENTIL   |
|   | HFA;VENTOLI |
|   | N HFA       |
|   | apixaban    |
|   | 2.5 MG      |
|   | tabletQTY:  |
|   |  60         |
|   | tabletRefil |
|   | ls:         |
|   | 0Commonly   |
|   | known as:   |
|   | ELIQUISTake |
|   |  1 tablet   |
|   | by mouth 2  |
|   | (two) times |
|   |  daily.     |
|   | aspirin 81  |
|   | MG EC       |
|   | tabletQTY:  |
 
|   |  30         |
|   | tabletRefil |
|   | ls:  0Take  |
|   | 1 tablet by |
|   |  mouth      |
|   | daily with  |
|   | breakfast.  |
|   | calcium     |
|   | acetate 667 |
|   |  MG         |
|   | capsuleRefi |
|   | lls:        |
|   | 0Commonly   |
|   | known as:   |
|   | PHOSLO      |
|   | carvedilol  |
|   | 3.125 MG    |
|   | tabletRefil |
|   | ls:         |
|   | 0Commonly   |
|   | known as:   |
|   | COREG       |
|   | clopidogrel |
|   |  75 MG      |
|   | tabletQTY:  |
|   |  30         |
|   | tabletRefil |
|   | ls:         |
|   | 11Commonly  |
|   | known as:   |
|   | PLAVIXTake  |
|   | 1 tablet by |
|   |  mouth      |
|   | daily.      |
|   | esomeprazol |
|   | e 20 MG     |
|   | capsuleRefi |
|   | lls:        |
|   | 0Commonly   |
|   | known as:   |
|   | NEXIUM      |
|   | furosemide  |
|   | 20 MG       |
|   | tabletRefil |
|   | ls:         |
|   | 0Commonly   |
|   | known as:   |
|   | LASIX       |
|   | HYDROcodone |
|   | -acetaminop |
|   | hen 5-325   |
|   | MG per      |
|   | tabletQTY:  |
|   |  30         |
|   | tabletRefil |
|   | ls:         |
|   | 0Commonly   |
|   | known as:   |
|   | NORCOTake 1 |
|   |  tablet by  |
 
|   | mouth every |
|   |  4 (four)   |
|   | hours as    |
|   | needed.     |
|   | insulin     |
|   | aspart 100  |
|   | UNIT/ML     |
|   | injectionRe |
|   | fills:      |
|   | 0Commonly   |
|   | known as:   |
|   | NOVOLOG     |
|   | insulin     |
|   | degludec    |
|   | 100 UNIT/ML |
|   |             |
|   | injectionRe |
|   | fills:      |
|   | 0Commonly   |
|   | known as:   |
|   | TRESIBA     |
|   | isosorbide  |
|   | mononitrate |
|   |  60 MG 24   |
|   | hr          |
|   | tabletQTY:  |
|   |  30         |
|   | tabletRefil |
|   | ls:         |
|   | 1Commonly   |
|   | known as:   |
|   | IMDURTake 1 |
|   |  tablet by  |
|   | mouth       |
|   | daily.      |
|   | loperamide  |
|   | 2 MG        |
|   | capsuleRefi |
|   | lls:        |
|   | 0Commonly   |
|   | known as:   |
|   | IMODIUM     |
|   | LORazepam 1 |
|   |  MG         |
|   | tabletQTY:  |
|   |  20         |
|   | tabletRefil |
|   | ls:         |
|   | 0Commonly   |
|   | known as:   |
|   | ATIVANTake  |
|   | 1 tablet by |
|   |  mouth      |
|   | daily as    |
|   | needed for  |
|   | Anxiety.    |
|   | nitroGLYCER |
|   | IN 0.4 MG   |
|   | SL          |
|   | tabletQTY:  |
 
|   |  30         |
|   | tabletRefil |
|   | ls:         |
|   | 0Doctor's   |
|   | comments:   |
|   | Hold for    |
|   | SBP less    |
|   | than        |
|   | 100Commonly |
|   |  known as:  |
|   |             |
|   | NITROSTATPl |
|   | ace 1       |
|   | tablet      |
|   | under the   |
|   | tongue      |
|   | every 5     |
|   | (five)      |
|   | minutes as  |
|   | needed for  |
|   | Chest pain. |
|   |             |
|   | nortriptyli |
|   | ne 25 MG    |
|   | capsuleRefi |
|   | lls:        |
|   | 0Commonly   |
|   | known as:   |
|   | PAMELOR     |
|   | ondansetron |
|   |  4 MG       |
|   | disintegrat |
|   | ing         |
|   | tabletRefil |
|   | ls:         |
|   | 0Commonly   |
|   | known as:   |
|   | ZOFRAN-ODT  |
|   | oxybutynin  |
|   | 5 MG        |
|   | tabletRefil |
|   | ls:         |
|   | 0Commonly   |
|   | known as:   |
|   | DITROPAN    |
|   | prochlorper |
|   | azine 5 MG  |
|   | tabletRefil |
|   | ls:  0      |
|   | rOPINIRole  |
|   | 0.25 MG     |
|   | tabletRefil |
|   | ls:         |
|   | 0Commonly   |
|   | known as:   |
|   | REQUIP      |
|   | SLOW-MAG    |
|   | 71.5-119 MG |
|   |             |
|   | TbecRefills |
 
|   | :  0Generic |
|   |  drug:      |
|   | Magnesium   |
|   | Cl-Calcium  |
|   | Carbonate   |
|   | Vitamin D3  |
|   | 5000 units  |
|   | CapsRefills |
|   | :  0        |
|   | Vortioxetin |
|   | e HBr 10 MG |
|   |             |
|   | TabsRefills |
|   | :  0  You   |
|   | might also  |
|   | be taking   |
|   | other       |
|   | medications |
|   |  not listed |
|   |  above. If  |
|   | you have    |
|   | questions   |
|   | about any   |
|   | of your     |
|   | other       |
|   | medications |
|   | , talk to   |
|   | the person  |
|   | who         |
|   | prescribed  |
|   | them or     |
|   | your        |
|   | Primary     |
|   | Care        |
|   | Provider.   |
|   |   STOP      |
|   | taking      |
|   | these       |
|   | medications |
|   |             |
|   | ranitidine  |
|   | 150 MG      |
|   | tabletCommo |
|   | nly known   |
|   | as:  [facility] |
|   |    Where to |
|   |  Get Your   |
|   | Medications |
|   |   These     |
|   | medications |
|   |  were sent  |
|   | to [facility]  |
|   | Pharmacy    |
|   | [id number] -      |
|   | [place],  |
|   | [place] - [address]   |
|   | [address]
|   |  [address]
|   [address]
|   |  [address]       |
 
|   | [place]   |
|   | [place] [id number]    |
|   | Phone:      |
|   | [phone] |
|   | 3           |
|   | atorvastati |
|   | n 40 MG     |
|   | tablet You  |
|   | can get     |
|   | these       |
|   | medications |
|   |  from any   |
|   | pharmacy    |
|   | Bring a     |
|   | paper       |
|   | prescriptio |
|   | n for each  |
|   | of these    |
|   | medications |
|   |             |
|   | CefTAZidime |
|   |  and        |
|   | Dextrose    |
|   | 2-5         |
|   | GM-%(50ML)  |
|   | Solr        |
|   | HYDROcodone |
|   | -acetaminop |
|   | hen 5-325   |
|   | MG per      |
|   | tablet      |
|   | LORazepam 1 |
|   |  MG         |
|   | tablet      |
|   | vancomycin  |
|   | 1000 mg     |
|   | injection   |
|   | Winston Vincent, |
|   |  DO2019 |
|   |  3:25       |
|   | PMDischarge |
|   |  took more  |
|   | than 35     |
|   | minutes, to |
|   |  include    |
|   | final       |
|   | examination |
|   | ,           |
|   | discussion  |
|   | of          |
|   | admission,  |
|   | and         |
|   | preparation |
|   |  of         |
|   | prescriptio |
|   | ns,         |
|   | instruction |
|   | s for       |
|   | ongoing     |
|   | care,       |
 
|   | follow up   |
|   | and         |
|   | dictation   |
|   | of summary. |
+---+-------------+
 from Last 3 Months
 
 Immunizations
 
 
+----------------------+-------------------------------------------+----------+
| Name                 | Dates Previously Given                    | Next Due |
+----------------------+-------------------------------------------+----------+
| Hepatitis B Adult    | 2016                                |          |
+----------------------+-------------------------------------------+----------+
| INFLUENZA PF,        | 2018 (Deferred:  - Pt states she    |          |
| QUADRIVALENT         | already had flu shot at dialysis clinic.  |          |
| (PED/ADOL/ADULT)     | Refused vax in hospital)                  |          |
+----------------------+-------------------------------------------+----------+
| Influenza, Trivalent | 2016                                |          |
|  W/Preservative      |                                           |          |
+----------------------+-------------------------------------------+----------+
| Pneumococcal         | 2012                                |          |
| Polysaccharide       |                                           |          |
| 23-valent            |                                           |          |
+----------------------+-------------------------------------------+----------+
 
 
 
 Family History
 
 
+------------------+-----------+------+----------+
| Medical History  | Relation  | Name | Comments |
+------------------+-----------+------+----------+
| High cholesterol | Father    | pvd  |          |
+------------------+-----------+------+----------+
| Hypertension     | Father    | pvd  |          |
+------------------+-----------+------+----------+
| Diabetes         | Paternal  |      |          |
|                  | Grandmoth |      |          |
|                  | er        |      |          |
+------------------+-----------+------+----------+
| Kidney disease   | Neg Hx    |      |          |
+------------------+-----------+------+----------+
| Malig hypertherm | Neg Hx    |      |          |
+------------------+-----------+------+----------+
 
 
 
+----------------------+----------+----------+----------+
| Relation             | Name     | Status   | Comments |
+----------------------+----------+----------+----------+
| Father               | pvd      |  |          |
+----------------------+----------+----------+----------+
| Mother               | dementia |  |          |
+----------------------+----------+----------+----------+
| Paternal Grandmother |          |          |          |
+----------------------+----------+----------+----------+
 
 
 
 
 Social History
 
 
+---------------+------------+-----------+--------+------------------+
| Tobacco Use   | Types      | Packs/Day | Years  | Date             |
|               |            |           | Used   |                  |
+---------------+------------+-----------+--------+------------------+
| Former Smoker | Cigarettes | 1         | 30     | Quit: 2004 |
+---------------+------------+-----------+--------+------------------+
 
 
 
+---------------------+---+---+---+
| Smokeless Tobacco:  |   |   |   |
| Never Used          |   |   |   |
+---------------------+---+---+---+
 
 
 
+-----------------------------------------+
| Tobacco Cessation: Counseling Given: No |
| Comments: quite smoking 2004            |
+-----------------------------------------+
 
 
 
+-------------+-----------+---------+----------+
| Alcohol Use | Drinks/We | oz/Week | Comments |
|             | ek        |         |          |
+-------------+-----------+---------+----------+
| No          |           |         |          |
+-------------+-----------+---------+----------+
 
 
 
+------------------+---------------+
| Sex Assigned at  | Date Recorded |
| Birth            |               |
+------------------+---------------+
| Not on file      |               |
+------------------+---------------+
 
 
 
 Last Filed Vital Signs
 
 
+-------------------+---------------------+-------------------------+
| Vital Sign        | Reading             | Time Taken              |
+-------------------+---------------------+-------------------------+
| Blood Pressure    | 109/52              | 2019 11:53 AM PST |
+-------------------+---------------------+-------------------------+
| Pulse             | 71                  | 2019 11:53 AM PST |
+-------------------+---------------------+-------------------------+
| Temperature       | 36.8   C (98.2   F) | 2019  2:40 PM PST |
+-------------------+---------------------+-------------------------+
| Respiratory Rate  | 18                  | 02/15/2019 11:30 AM PST |
+-------------------+---------------------+-------------------------+
 
| Oxygen Saturation | 96%                 | 2019 11:53 AM PST |
+-------------------+---------------------+-------------------------+
| Inhaled Oxygen    | -                   | -                       |
| Concentration     |                     |                         |
+-------------------+---------------------+-------------------------+
| Weight            | 65.3 kg (144 lb)    | 2019 10:29 PM PST |
+-------------------+---------------------+-------------------------+
| Height            | 177.8 cm (5' 10")   | 2019 10:29 PM PST |
+-------------------+---------------------+-------------------------+
| Body Mass Index   | 20.66               | 2019 10:29 PM PST |
+-------------------+---------------------+-------------------------+
 
 
 
 Plan of Treatment
 
 
+--------+---------+-----------+----------------------+-------------+
| Date   | Type    | Specialty | Care Team            | Description |
+--------+---------+-----------+----------------------+-------------+
| 04/10/ | Office  |           |   Srinivasan Abdi,  |             |
| 2019   | Visit   |           | DPM  [address], |             |
|        |         |           |  [address]  [place],  |             |
|        |         |           | [place] [address]             |             |
|        |         |           | [phone]         |             |
|        |         |           | [phone] (Fax)   |             |
+--------+---------+-----------+----------------------+-------------+
 
 
 
+----------------------+-----------+---------------------------------+----------+
| Health Maintenance   | Due Date  | Last Done                       | Comments |
+----------------------+-----------+---------------------------------+----------+
| Diabetic Eye Exam    |  |                                 |          |
|                      | 9         |                                 |          |
+----------------------+-----------+---------------------------------+----------+
| Vaccine:             |  |                                 |          |
| Dtap/Tdap/Td (1 -    | 8         |                                 |          |
| Tdap)                |           |                                 |          |
+----------------------+-----------+---------------------------------+----------+
| Breast Cancer        |  |                                 |          |
| Screening            | 9         |                                 |          |
| (Mammogram)          |           |                                 |          |
+----------------------+-----------+---------------------------------+----------+
| Colon Cancer         |  |                                 |          |
| Screening            | 9         |                                 |          |
| (Colonoscopy)        |           |                                 |          |
+----------------------+-----------+---------------------------------+----------+
| Vaccine: Zoster (1   |  |                                 |          |
| of 2)                | 9         |                                 |          |
+----------------------+-----------+---------------------------------+----------+
| DEXA SCAN SCREENING  |  |                                 |          |
|                      | 4         |                                 |          |
+----------------------+-----------+---------------------------------+----------+
| Vaccine:             |  | 2012                      |          |
| Pneumococcal 65+     | 4         |                                 |          |
| High/Highest Risk (1 |           |                                 |          |
|  of 2 - PCV13)       |           |                                 |          |
+----------------------+-----------+---------------------------------+----------+
| Lung Cancer          |  | 2017                      |          |
 
| Screening            | 8         |                                 |          |
+----------------------+-----------+---------------------------------+----------+
| Hemoglobin A1c       |  | 2018, 2018,         |          |
|                      | 9         | 2017, Additional history  |          |
|                      |           | exists                          |          |
+----------------------+-----------+---------------------------------+----------+
| Vaccine: Influenza   |  | 2016                      |          |
| (Season Ended)       | 9         |                                 |          |
+----------------------+-----------+---------------------------------+----------+
| Diabetic Foot Exam   |  | 2018, 2017          |          |
|                      | 9         |                                 |          |
+----------------------+-----------+---------------------------------+----------+
 
 
 
 Implants
 
 
+-------------------------------+-------+--------+-------------+--------+--------+--------+
| Implanted                     | Type  | Area   | Manufacture | Device | Expira | Model  |
|                               |       |        | r           |        | tion   | /      |
|                               |       |        |             | Identi | Date   | Serial |
|                               |       |        |             | fier   |        |  / Lot |
+-------------------------------+-------+--------+-------------+--------+--------+--------+
| Express                       | Stent | Arteri | BOSTON      |        |        | RENAL  |
| Sd-2017Implanted: Qty: 1 |       | al     | SCIENT[employer]  |        |        | /BILIA |
|  on 2017 by Kirt Mclaughlin, |       |        | MAURICE - [employer] |        |        | RY /   |
|  MD                           |       |        |             |        |        | /[id number] |
|                               |       |        |             |        |        | 046    |
+-------------------------------+-------+--------+-------------+--------+--------+--------+
| Express                       | Stent | Left:  | [employer]ON      |        |        | [id number] |
| Stent-2017Implanted: Qty: |       | Arteri | MEDICAL     |        |        | [id number] |
|  1 on 2017 by Kirt Mclaughlin  |       | al     | PRODUCTS    |        |        | 130    |
| MD GRAHAM                         |       |        |             |        |        | /[id number] |
|                               |       |        |             |        |        | [id number] |
|                               |       |        |             |        |        | 694    |
|                               |       |        |             |        |        | /[id number] |
|                               |       |        |             |        |        | 695    |
+-------------------------------+-------+--------+-------------+--------+--------+--------+
| Synergy Georgi 3.5 X             | Stent | Corona | B[employer]ON      |        | / | [id number] |
| -2017Implanted: Qty: 1  |       | ry     | [employer]  |        |    | [id number] |
| on 2017 by Cooper,      |       |        |             |        |        | 50 /   |
| Marcos BRADSHAW MD               |       |        |             |        |        | / |
|                               |       |        |             |        |        | 346    |
+-------------------------------+-------+--------+-------------+--------+--------+--------+
| Synergy Georgi 2.25 X            | Stent | [place]ona | [employer]      |        | / | [id number] |
| -2017Implanted: Qty: 1  |       | ry     | [employer]IFIC  |        |    | [id number] |
| on 2017 by Cooper,      |       |        |             |        |        | 20 /   |
| Marcos BRADSHAW MD               |       |        |             |        |        | / |
|                               |       |        |             |        |        | 165    |
+-------------------------------+-------+--------+-------------+--------+--------+--------+
| Synergy Georgi 2.25 X            | Stent | [place]ona | [employer]      |        | / | [id number] |
| 8-2017Implanted: Qty: 1   |       | ry     | [employer]  |        |    | [id number] |
| on 2017 by Cooper,      |       |        |             |        |        | 20 /   |
| Marcos BRADSHAW MD               |       |        |             |        |        | / |
|                               |       |        |             |        |        | 438    |
+-------------------------------+-------+--------+-------------+--------+--------+--------+
| Epic                          | Stent | Right: | [employer]ON      |        | / | [id number] |
| Vascular-2019Implanted:  |       |        | [employer]ENTIFIC  |        |    | 2000 |
| Qty: 1 on 2019 by Arnoldo,  |       | Arteri |             |        |        | 020    |
 
| Kirt STEVENSON MD                     |       | al     |             |        |        | /N/A   |
|                               |       |        |             |        |        | /[id number] |
|                               |       |        |             |        |        | 135    |
+-------------------------------+-------+--------+-------------+--------+--------+--------+
| Supercable Iso Elastic        |       | Left:  | [employer]     |        | / |  |
| Cerclage System   1.5 Mm      |       | Femur  |             |        | 2018   | -1010  |
| PolymerImplanted: Qty: 1 on   |       |        |             |        |        | /      |
| 2016 by Black,          |       |        |             |        |        | / |
| MD Uriel                |       |        |             |        |        | 2      |
+-------------------------------+-------+--------+-------------+--------+--------+--------+
| Imp Hip Stem Bplr Slfctr      |       | Left:  | JJHCS DEPUY |        | / | 1035-4 |
| 48x28 - [id number]:    |       | Femur  |  ORTHO -    |        | 2020   | 8-000  |
| Qty: 1 on 2016 by       |       |        | DEPU        |        |        | /      |
| Uriel Roa MD         |       |        |             |        |        | /[id number] |
|                               |       |        |             |        |        | 3      |
+-------------------------------+-------+--------+-------------+--------+--------+--------+
| Corail Femoral StemImplanted: |       | Left:  | DEPUY       |        | / | [id number] |
|  Qty: 1 on 2016 by      |       | Femur  |             |        | 2020   | 3 / /  |
| Uriel Roa MD         |       |        |             |        |        |        |
+-------------------------------+-------+--------+-------------+--------+--------+--------+
| Imp Hip Fem Hd Artc 28mm Pls5 |       | Left:  | JJHCS DEPUY |        | / | 1365-1 |
|  - [id number]: Qty: 1  |       | Femur  |  ORTHO -    |        | 2020   | 2-000  |
| on 2016 by Black,       |       |        | DEPU        |        |        | /      |
| MD Uriel                |       |        |             |        |        | / |
|                               |       |        |             |        |        | 1034   |
+-------------------------------+-------+--------+-------------+--------+--------+--------+
| Xgrft Vascu-Guard Ptch 0.8x8  |       |        | HOOKER      |        | / | VG-010 |
| - SnaImplanted: Qty: 1 on     |       |        | BIOSCIENCE  |        |    | 8N /   |
| 10/25/2016 by Kirt Mclaughlin MD  |       |        | - OLGA      |        |        | /[id number] |
|                               |       |        |             |        |        |  |
|                               |       |        |             |        |        | 9414   |
+-------------------------------+-------+--------+-------------+--------+--------+--------+
 
 
 
 Procedures
 
 
+----------------------+--------+-------------+----------------------+----------------------
+
| Procedure Name       | Priori | Date/Time   | Associated Diagnosis | Comments             
|
|                      | ty     |             |                      |                      
|
+----------------------+--------+-------------+----------------------+----------------------
+
| XR FOOT RIGHT        | Routin | 2019  |   Toe pain, right    |   Results for this   
|
|                      | e      |  2:58 PM    |                      | procedure are in the 
|
|                      |        | PST         |                      |  results section.    
|
+----------------------+--------+-------------+----------------------+----------------------
+
| XR FOOT LEFT         | Routin | 2019  |   Post-operative     |   Results for this   
|
|                      | e      |  2:58 PM    | state                | procedure are in the 
|
|                      |        | PST         |                      |  results section.    
|
 
+----------------------+--------+-------------+----------------------+----------------------
+
| POCT GLUCOSE         | Routin | 02/15/2019  |                      |   Results for this   
|
|                      | e      | 11:36 AM    |                      | procedure are in the 
|
|                      |        | PST         |                      |  results section.    
|
+----------------------+--------+-------------+----------------------+----------------------
+
| POCT GLUCOSE         | Routin | 02/15/2019  |                      |   Results for this   
|
|                      | e      |  6:01 AM    |                      | procedure are in the 
|
|                      |        | PST         |                      |  results section.    
|
+----------------------+--------+-------------+----------------------+----------------------
+
| POCT GLUCOSE         | Routin | 2019  |                      |   Results for this   
|
|                      | e      | 10:32 PM    |                      | procedure are in the 
|
|                      |        | PST         |                      |  results section.    
|
+----------------------+--------+-------------+----------------------+----------------------
+
| [facility] SEPTIC LACTIC   | STAT   | 2019  |                      |   Results for this   
|
| ACID                 |        | 10:12 PM    |                      | procedure are in the 
|
|                      |        | PST         |                      |  results section.    
|
+----------------------+--------+-------------+----------------------+----------------------
+
| [facility] SEPTIC LACTIC   | STAT   | 2019  |                      |   Results for this   
|
| ACID                 |        |  7:22 PM    |                      | procedure are in the 
|
|                      |        | PST         |                      |  results section.    
|
+----------------------+--------+-------------+----------------------+----------------------
+
| TSH                  | STAT   | 2019  |                      |   Results for this   
|
|                      |        |  5:39 PM    |                      | procedure are in the 
|
|                      |        | PST         |                      |  results section.    
|
+----------------------+--------+-------------+----------------------+----------------------
+
| FOLATE               | Add-On | 2019  |                      |   Results for this   
|
|                      |        |  5:39 PM    |                      | procedure are in the 
|
|                      |        | PST         |                      |  results section.    
|
+----------------------+--------+-------------+----------------------+----------------------
+
| VITAMIN B12          | Add-On | 2019  |                      |   Results for this   
|
 
|                      |        |  5:39 PM    |                      | procedure are in the 
|
|                      |        | PST         |                      |  results section.    
|
+----------------------+--------+-------------+----------------------+----------------------
+
| SEDIMENTATION RATE,  | STAT   | 2019  |                      |   Results for this   
|
| AUTOMATED            |        |  5:39 PM    |                      | procedure are in the 
|
|                      |        | PST         |                      |  results section.    
|
+----------------------+--------+-------------+----------------------+----------------------
+
| COMPREHENSIVE        | STAT   | 2019  |                      |   Results for this   
|
| METABOLIC PANEL      |        |  5:39 PM    |                      | procedure are in the 
|
|                      |        | PST         |                      |  results section.    
|
+----------------------+--------+-------------+----------------------+----------------------
+
| CBC W/AUTO DIFF      | STAT   | 2019  |                      |   Results for this   
|
| (REFLEX TO MANUAL)   |        |  5:39 PM    |                      | procedure are in the 
|
|                      |        | PST         |                      |  results section.    
|
+----------------------+--------+-------------+----------------------+----------------------
+
| C-REACTIVE PROTEIN   | STAT   | 2019  |                      |   Results for this   
|
|                      |        |  5:39 PM    |                      | procedure are in the 
|
|                      |        | PST         |                      |  results section.    
|
+----------------------+--------+-------------+----------------------+----------------------
+
| BLOOD CULTURE, SET 2 | STAT   | 2019  |                      |   Results for this   
|
|                      |        |  5:39 PM    |                      | procedure are in the 
|
|                      |        | PST         |                      |  results section.    
|
+----------------------+--------+-------------+----------------------+----------------------
+
| DEVEN LEWIS RIGHT        | ASAP   | 2019  |                      |   Results for this   
|
|                      |        |  5:08 PM    |                      | procedure are in the 
|
|                      |        | PST         |                      |  results section.    
|
+----------------------+--------+-------------+----------------------+----------------------
+
| [facility] SEPTIC LACTIC   | STAT   | 2019  |                      |   Results for this   
|
| ACID                 |        |  4:55 PM    |                      | procedure are in the 
|
|                      |        | PST         |                      |  results section.    
|
 
+----------------------+--------+-------------+----------------------+----------------------
+
| BLOOD CULTURE, SET 1 | STAT   | 2019  |                      |   Results for this   
|
|                      |        |  4:55 PM    |                      | procedure are in the 
|
|                      |        | PST         |                      |  results section.    
|
+----------------------+--------+-------------+----------------------+----------------------
+
| ED INFORMATION       | Routin | 2019  |                      |   Results for this   
|
| EXCHANGE             | e      |  3:21 PM    |                      | procedure are in the 
|
|                      |        | PST         |                      |  results section.    
|
+----------------------+--------+-------------+----------------------+----------------------
+
| ED INFORMATION       | Routin | 2019  |                      |   Results for this   
|
| EXCHANGE             | e      |  3:58 PM    |                      | procedure are in the 
|
|                      |        | PST         |                      |  results section.    
|
+----------------------+--------+-------------+----------------------+----------------------
+
| POCT GLUCOSE         | Routin | 2019  |                      |   Results for this   
|
|                      | e      | 12:10 PM    |                      | procedure are in the 
|
|                      |        | PST         |                      |  results section.    
|
+----------------------+--------+-------------+----------------------+----------------------
+
| POCT GLUCOSE         | Routin | 2019  |                      |   Results for this   
|
|                      | e      |  5:55 AM    |                      | procedure are in the 
|
|                      |        | PST         |                      |  results section.    
|
+----------------------+--------+-------------+----------------------+----------------------
+
| PHOSPHOROUS          | Routin | 2019  |                      |   Results for this   
|
|                      | e      |  5:38 AM    |                      | procedure are in the 
|
|                      |        | PST         |                      |  results section.    
|
+----------------------+--------+-------------+----------------------+----------------------
+
| MAGNESIUM            | Routin | 2019  |                      |   Results for this   
|
|                      | e      |  5:38 AM    |                      | procedure are in the 
|
|                      |        | PST         |                      |  results section.    
|
+----------------------+--------+-------------+----------------------+----------------------
+
| BASIC METABOLIC      | Routin | 2019  |                      |   Results for this   
|
 
| PANEL                | e      |  5:38 AM    |                      | procedure are in the 
|
|                      |        | PST         |                      |  results section.    
|
+----------------------+--------+-------------+----------------------+----------------------
+
| CBC W/AUTO DIFF      | Routin | 2019  |                      |   Results for this   
|
| (REFLEX TO MANUAL)   | e      |  5:38 AM    |                      | procedure are in the 
|
|                      |        | PST         |                      |  results section.    
|
+----------------------+--------+-------------+----------------------+----------------------
+
| POCT GLUCOSE         | Routin | 2019  |                      |   Results for this   
|
|                      | e      |  9:29 PM    |                      | procedure are in the 
|
|                      |        | PST         |                      |  results section.    
|
+----------------------+--------+-------------+----------------------+----------------------
+
| POCT GLUCOSE         | Routin | 2019  |                      |   Results for this   
|
|                      | e      |  4:06 PM    |                      | procedure are in the 
|
|                      |        | PST         |                      |  results section.    
|
+----------------------+--------+-------------+----------------------+----------------------
+
| POCT GLUCOSE         | Routin | 2019  |                      |   Results for this   
|
|                      | e      | 12:13 PM    |                      | procedure are in the 
|
|                      |        | PST         |                      |  results section.    
|
+----------------------+--------+-------------+----------------------+----------------------
+
| EKG STANDARD 12 LEAD | Routin | 2019  |                      |   Results for this   
|
|                      | e      |  6:18 AM    |                      | procedure are in the 
|
|                      |        | PST         |                      |  results section.    
|
+----------------------+--------+-------------+----------------------+----------------------
+
| POCT GLUCOSE         | Routin | 2019  |                      |   Results for this   
|
|                      | e      |  5:21 AM    |                      | procedure are in the 
|
|                      |        | PST         |                      |  results section.    
|
+----------------------+--------+-------------+----------------------+----------------------
+
| BASIC METABOLIC      | Routin | 2019  |                      |   Results for this   
|
| PANEL                | e - AM |  4:53 AM    |                      | procedure are in the 
|
|                      |        | PST         |                      |  results section.    
|
 
+----------------------+--------+-------------+----------------------+----------------------
+
| CBC W/AUTO DIFF      | Routin | 2019  |                      |   Results for this   
|
| (REFLEX TO MANUAL)   | e - AM |  4:53 AM    |                      | procedure are in the 
|
|                      |        | PST         |                      |  results section.    
|
+----------------------+--------+-------------+----------------------+----------------------
+
| POCT GLUCOSE         | Routin | 2019  |                      |   Results for this   
|
|                      | e      |  9:00 PM    |                      | procedure are in the 
|
|                      |        | PST         |                      |  results section.    
|
+----------------------+--------+-------------+----------------------+----------------------
+
| IR STENT FEMORAL     | Routin | 2019  |                      |   Results for this   
|
| POPLITEAL            | e      |  6:45 PM    |                      | procedure are in the 
|
|                      |        | PST         |                      |  results section.    
|
+----------------------+--------+-------------+----------------------+----------------------
+
| IR AORTAGRAM         | Routin | 2019  |                      |   Results for this   
|
| ABDOMINAL            | e      |  6:45 PM    |                      | procedure are in the 
|
| SERIALOGRAM          |        | PST         |                      |  results section.    
|
+----------------------+--------+-------------+----------------------+----------------------
+
| IR ANGIOGRAM         | Routin | 2019  |                      |   Results for this   
|
| EXTREMITY RIGHT      | e      |  6:45 PM    |                      | procedure are in the 
|
|                      |        | PST         |                      |  results section.    
|
+----------------------+--------+-------------+----------------------+----------------------
+
| BLOOD CULTURE, SET 2 | Timed  | 2019  |                      |   Results for this   
|
|                      |        |  6:05 PM    |                      | procedure are in the 
|
|                      |        | PST         |                      |  results section.    
|
+----------------------+--------+-------------+----------------------+----------------------
+
| IR GUIDANCE VASCULAR | Routin | 2019  |                      |   Results for this   
|
|  ACCESS US           | e      |  5:40 PM    |                      | procedure are in the 
|
|                      |        | PST         |                      |  results section.    
|
+----------------------+--------+-------------+----------------------+----------------------
+
| C-REACTIVE PROTEIN   | STAT   | 2019  |                      |   Results for this   
|
 
|                      |        |  3:43 PM    |                      | procedure are in the 
|
|                      |        | PST         |                      |  results section.    
|
+----------------------+--------+-------------+----------------------+----------------------
+
| SEDIMENTATION RATE,  | STAT   | 2019  |                      |   Results for this   
|
| AUTOMATED            |        |  3:43 PM    |                      | procedure are in the 
|
|                      |        | PST         |                      |  results section.    
|
+----------------------+--------+-------------+----------------------+----------------------
+
| CK                   | STAT   | 2019  |                      |   Results for this   
|
|                      |        |  3:43 PM    |                      | procedure are in the 
|
|                      |        | PST         |                      |  results section.    
|
+----------------------+--------+-------------+----------------------+----------------------
+
| COMPREHENSIVE        | STAT   | 2019  |                      |   Results for this   
|
| METABOLIC PANEL      |        |  3:43 PM    |                      | procedure are in the 
|
|                      |        | PST         |                      |  results section.    
|
+----------------------+--------+-------------+----------------------+----------------------
+
| CBC W/MANUAL DIFF    | STAT   | 2019  |                      |   Results for this   
|
|                      |        |  3:43 PM    |                      | procedure are in the 
|
|                      |        | PST         |                      |  results section.    
|
+----------------------+--------+-------------+----------------------+----------------------
+
| BLOOD CULTURE, SET 1 | Timed  | 2019  |                      |   Results for this   
|
|                      |        |  3:43 PM    |                      | procedure are in the 
|
|                      |        | PST         |                      |  results section.    
|
+----------------------+--------+-------------+----------------------+----------------------
+
| US LOWER EXTREMITY   | STAT   | 2019  |                      |   Results for this   
|
| ARTERIAL RIGHT       |        |  2:23 PM    |                      | procedure are in the 
|
|                      |        | PST         |                      |  results section.    
|
+----------------------+--------+-------------+----------------------+----------------------
+
| ED INFORMATION       | Routin | 2019  |                      |   Results for this   
|
| EXCHANGE             | e      |  1:03 PM    |                      | procedure are in the 
|
|                      |        | PST         |                      |  results section.    
|
 
+----------------------+--------+-------------+----------------------+----------------------
+
| POCT GLUCOSE         | Routin | 2019  |                      |   Results for this   
|
|                      | e      | 11:45 AM    |                      | procedure are in the 
|
|                      |        | PST         |                      |  results section.    
|
+----------------------+--------+-------------+----------------------+----------------------
+
| CBC W/MANUAL DIFF    | Routin | 2019  |                      |   Results for this   
|
|                      | e      |  5:54 AM    |                      | procedure are in the 
|
|                      |        | PST         |                      |  results section.    
|
+----------------------+--------+-------------+----------------------+----------------------
+
| SEDIMENTATION RATE,  | Routin | 2019  |                      |   Results for this   
|
| AUTOMATED            | e - AM |  5:54 AM    |                      | procedure are in the 
|
|                      |        | PST         |                      |  results section.    
|
+----------------------+--------+-------------+----------------------+----------------------
+
| C-REACTIVE PROTEIN   | Routin | 2019  |                      |   Results for this   
|
|                      | e - AM |  5:54 AM    |                      | procedure are in the 
|
|                      |        | PST         |                      |  results section.    
|
+----------------------+--------+-------------+----------------------+----------------------
+
| COMPREHENSIVE        | Routin | 2019  |                      |   Results for this   
|
| METABOLIC PANEL      | e - AM |  5:54 AM    |                      | procedure are in the 
|
|                      |        | PST         |                      |  results section.    
|
+----------------------+--------+-------------+----------------------+----------------------
+
| POCT GLUCOSE         | Routin | 2019  |                      |   Results for this   
|
|                      | e      |  5:22 AM    |                      | procedure are in the 
|
|                      |        | PST         |                      |  results section.    
|
+----------------------+--------+-------------+----------------------+----------------------
+
| POCT GLUCOSE         | Routin | 2019  |                      |   Results for this   
|
|                      | e      |  9:02 PM    |                      | procedure are in the 
|
|                      |        | PST         |                      |  results section.    
|
+----------------------+--------+-------------+----------------------+----------------------
+
| POCT GLUCOSE         | Routin | 2019  |                      |   Results for this   
|
 
|                      | e      |  4:16 PM    |                      | procedure are in the 
|
|                      |        | PST         |                      |  results section.    
|
+----------------------+--------+-------------+----------------------+----------------------
+
| POCT GLUCOSE         | Routin | 2019  |                      |   Results for this   
|
|                      | e      | 12:17 PM    |                      | procedure are in the 
|
|                      |        | PST         |                      |  results section.    
|
+----------------------+--------+-------------+----------------------+----------------------
+
| RENAL FUNCTION PANEL | STAT   | 2019  |                      |   Results for this   
|
|                      |        |  8:47 AM    |                      | procedure are in the 
|
|                      |        | PST         |                      |  results section.    
|
+----------------------+--------+-------------+----------------------+----------------------
+
| CBC W/AUTO DIFF      | STAT   | 2019  |                      |   Results for this   
|
| (REFLEX TO MANUAL)   |        |  8:47 AM    |                      | procedure are in the 
|
|                      |        | PST         |                      |  results section.    
|
+----------------------+--------+-------------+----------------------+----------------------
+
| EKG STANDARD 12 LEAD | Routin | 2019  |                      |   Results for this   
|
|                      | e      |  5:50 AM    |                      | procedure are in the 
|
|                      |        | PST         |                      |  results section.    
|
+----------------------+--------+-------------+----------------------+----------------------
+
| POCT GLUCOSE         | Routin | 2019  |                      |   Results for this   
|
|                      | e      |  5:18 AM    |                      | procedure are in the 
|
|                      |        | PST         |                      |  results section.    
|
+----------------------+--------+-------------+----------------------+----------------------
+
| POCT GLUCOSE         | Routin | 2019  |                      |   Results for this   
|
|                      | e      | 10:42 PM    |                      | procedure are in the 
|
|                      |        | PST         |                      |  results section.    
|
+----------------------+--------+-------------+----------------------+----------------------
+
| POCT GLUCOSE         | Routin | 2019  |                      |   Results for this   
|
|                      | e      |  9:16 PM    |                      | procedure are in the 
|
|                      |        | PST         |                      |  results section.    
|
 
+----------------------+--------+-------------+----------------------+----------------------
+
| TROPONIN I           | Timed  | 2019  |                      |   Results for this   
|
|                      |        |  5:47 PM    |                      | procedure are in the 
|
|                      |        | PST         |                      |  results section.    
|
+----------------------+--------+-------------+----------------------+----------------------
+
| POCT GLUCOSE         | Routin | 2019  |                      |   Results for this   
|
|                      | e      |  4:56 PM    |                      | procedure are in the 
|
|                      |        | PST         |                      |  results section.    
|
+----------------------+--------+-------------+----------------------+----------------------
+
| EKG STANDARD 12 LEAD | STAT   | 2019  |                      |   Results for this   
|
|                      |        |  3:01 PM    |                      | procedure are in the 
|
|                      |        | PST         |                      |  results section.    
|
+----------------------+--------+-------------+----------------------+----------------------
+
| TROPONIN I           | Timed  | 2019  |                      |   Results for this   
|
|                      |        |  2:11 PM    |                      | procedure are in the 
|
|                      |        | PST         |                      |  results section.    
|
+----------------------+--------+-------------+----------------------+----------------------
+
| ED ISTAT TROPONIN    | STAT   | 2019  |                      |   Results for this   
|
|                      |        | 10:26 AM    |                      | procedure are in the 
|
|                      |        | PST         |                      |  results section.    
|
+----------------------+--------+-------------+----------------------+----------------------
+
| POC CARDIAC TROPONIN | Routin | 2019  |                      |   Results for this   
|
|                      | e      |  9:54 AM    |                      | procedure are in the 
|
|                      |        | PST         |                      |  results section.    
|
+----------------------+--------+-------------+----------------------+----------------------
+
| ED INFORMATION       | Routin | 2019  |                      |   Results for this   
|
| EXCHANGE             | e      |  9:47 AM    |                      | procedure are in the 
|
|                      |        | PST         |                      |  results section.    
|
+----------------------+--------+-------------+----------------------+----------------------
+
| EKG 12 LEAD UNIT     | Routin | 2019  |                      |   Results for this   
|
 
| PERFORMED            | e      |  9:44 AM    |                      | procedure are in the 
|
|                      |        | PST         |                      |  results section.    
|
+----------------------+--------+-------------+----------------------+----------------------
+
| POCT GLUCOSE         | Routin | 2019  |                      |   Results for this   
|
|                      | e      | 11:23 AM    |                      | procedure are in the 
|
|                      |        | PST         |                      |  results section.    
|
+----------------------+--------+-------------+----------------------+----------------------
+
| POCT GLUCOSE         | Routin | 2019  |                      |   Results for this   
|
|                      | e      |  5:39 AM    |                      | procedure are in the 
|
|                      |        | PST         |                      |  results section.    
|
+----------------------+--------+-------------+----------------------+----------------------
+
| COMPREHENSIVE        | Routin | 2019  |                      |   Results for this   
|
| METABOLIC PANEL      | e      |  4:58 AM    |                      | procedure are in the 
|
|                      |        | PST         |                      |  results section.    
|
+----------------------+--------+-------------+----------------------+----------------------
+
| CBC W/AUTO DIFF      | Routin | 2019  |                      |   Results for this   
|
| (REFLEX TO MANUAL)   | e      |  4:58 AM    |                      | procedure are in the 
|
|                      |        | PST         |                      |  results section.    
|
+----------------------+--------+-------------+----------------------+----------------------
+
| POCT GLUCOSE         | Routin | 2019  |                      |   Results for this   
|
|                      | e      |  8:58 PM    |                      | procedure are in the 
|
|                      |        | PST         |                      |  results section.    
|
+----------------------+--------+-------------+----------------------+----------------------
+
| POCT GLUCOSE         | Routin | 2019  |                      |   Results for this   
|
|                      | e      |  4:09 PM    |                      | procedure are in the 
|
|                      |        | PST         |                      |  results section.    
|
+----------------------+--------+-------------+----------------------+----------------------
+
| POCT GLUCOSE         | Routin | 2019  |                      |   Results for this   
|
|                      | e      | 11:00 AM    |                      | procedure are in the 
|
|                      |        | PST         |                      |  results section.    
|
 
+----------------------+--------+-------------+----------------------+----------------------
+
| POCT GLUCOSE         | Routin | 2019  |                      |   Results for this   
|
|                      | e      |  5:38 AM    |                      | procedure are in the 
|
|                      |        | PST         |                      |  results section.    
|
+----------------------+--------+-------------+----------------------+----------------------
+
| COMPREHENSIVE        | Routin | 2019  |                      |   Results for this   
|
| METABOLIC PANEL      | e      |  5:25 AM    |                      | procedure are in the 
|
|                      |        | PST         |                      |  results section.    
|
+----------------------+--------+-------------+----------------------+----------------------
+
| CBC W/AUTO DIFF      | Routin | 2019  |                      |   Results for this   
|
| (REFLEX TO MANUAL)   | e      |  5:25 AM    |                      | procedure are in the 
|
|                      |        | PST         |                      |  results section.    
|
+----------------------+--------+-------------+----------------------+----------------------
+
 from Last 3 Months
 
 Results
 X-ray foot right 3+ views (2019  2:58 PM)
 
+------------------------------------------------------------------------+--------------+
| Impressions                                                            | Performed At |
+------------------------------------------------------------------------+--------------+
|   Linear lucency/irregularity at the distal 1st phalanx, may represent |   [facility]     |
|   minimally displaced fracture.  Signed by: Oleksandr Lemus  Sign         | RADIOLOGY    |
| Date/Time: 2019 10:20 AM                                         |              |
+------------------------------------------------------------------------+--------------+
 
 
 
+------------------------------------------------------------------------+--------------+
| Narrative                                                              | Performed At |
+------------------------------------------------------------------------+--------------+
|   RIGHT FOOT THREE VIEWS  CLINICAL INFORMATION:  Possible fracture of  |   [facility]     |
| right hallux.  COMPARISON:  XR TOES RIGHT (2019); XR FOOT LEFT    | RADIOLOGY    |
| (2018);  FINDINGS:  No acute fracture or dislocation.  Small     |              |
| calcaneal plantar enthesophyte.  Linear lucency/irregularity at the    |              |
| distal 1st phalanx, may represent  minimally displaced fracture.  Mild |              |
|  midfoot degeneration.                                                 |              |
+------------------------------------------------------------------------+--------------+
 
 
 
+-------------------------------------------------------------------------+
| Procedure Note                                                          |
+-------------------------------------------------------------------------+
|   Lauro Baldwin Results In 2019 10:23 AM PST  RIGHT FOOT THREE VIEWS |
| CLINICAL INFORMATION:                                                   |
| Possible fracture of right hallux.                                      |
 
| COMPARISON:                                                             |
| XR TOES RIGHT (2019); XR FOOT LEFT (2018);                   |
| FINDINGS:                                                               |
| No acute fracture or dislocation.                                       |
| Small calcaneal plantar enthesophyte.                                   |
| Linear lucency/irregularity at the distal 1st phalanx, may represent    |
| minimally displaced fracture.                                           |
| Mild midfoot degeneration.                                              |
| IMPRESSION:                                                             |
| Linear lucency/irregularity at the distal 1st phalanx, may represent    |
| minimally displaced fracture.                                           |
| Signed by: Oleksandr Lemus                                                 |
| Sign Date/Time: 2019 10:20 AM                                     |
+-------------------------------------------------------------------------+
 
 
 
+--------------------+------------------+--------------------+--------------+
| Performing         | Address          | City/State/RUSTcode | Phone Number |
| Organization       |                  |                    |              |
+--------------------+------------------+--------------------+--------------+
|   [facility] |   [address] | [place] [address] |              |
+--------------------+------------------+--------------------+--------------+
 X-ray foot left 3 + views (2019  2:58 PM)
 
+----------------------------------------------------------------------+--------------+
| Impressions                                                          | Performed At |
+----------------------------------------------------------------------+--------------+
|   No acute fracture.    No radiographic evidence for osteomyelitis   |   [facility]     |
| Signed by: Oleksandr Lemus  Sign Date/Time: 2019 10:21 AM         | RADIOLOGY    |
+----------------------------------------------------------------------+--------------+
 
 
 
+------------------------------------------------------------------------+--------------+
| Narrative                                                              | Performed At |
+------------------------------------------------------------------------+--------------+
|   LEFT FOOT THREE VIEWS  CLINICAL INFORMATION:  8 weeks post op,       |   [facility]     |
| forefoot amputation.  COMPARISON:  XR TOES RIGHT (2019); XR FOOT  | RADIOLOGY    |
| LEFT (2018); XR FOOT LEFT  (2018);  FINDINGS:  Amputation  |              |
| of the forefoot at the level of the proximal metacarpals.  No          |              |
| radiographic evidence for osteomyelitis.  No acute fractures.          |              |
+------------------------------------------------------------------------+--------------+
 
 
 
+------------------------------------------------------------------------+
| Procedure Note                                                         |
+------------------------------------------------------------------------+
|   Lauro Baldwin Results In - 2019 10:25 AM PST  LEFT FOOT THREE VIEWS |
| CLINICAL INFORMATION:                                                  |
| 8 weeks post op, forefoot amputation.                                  |
| COMPARISON:                                                            |
| XR TOES RIGHT (2019); XR FOOT LEFT (2018); XR FOOT LEFT     |
| (2018);                                                          |
| FINDINGS:                                                              |
| Amputation of the forefoot at the level of the proximal metacarpals.   |
| No radiographic evidence for osteomyelitis.                            |
| No acute fractures.                                                    |
| IMPRESSION:                                                            |
 
| No acute fracture.  No radiographic evidence for osteomyelitis         |
| Signed by: Oleksandr Lemus                                                |
| Sign Date/Time: 2019 10:21 AM                                    |
+------------------------------------------------------------------------+
 
 
 
+--------------------+------------------+--------------------+--------------+
| Performing         | Address          | City/State/Zipcode | Phone Number |
| Organization       |                  |                    |              |
+--------------------+------------------+--------------------+--------------+
|   [facility] |   [address] | [place] [address] |              |
+--------------------+------------------+--------------------+--------------+
 POCT glucose (02/15/2019 11:36 AM)Only the most recent of 25 results within the time period
 is included.
 
+--------------------+--------------------------+---------------+-----------------+
| Component          | Value                    | Ref Range     | Performed At    |
+--------------------+--------------------------+---------------+-----------------+
| GLUCOSE,POC SCREEN | 231 (H)Comment: Testing  | 65 - 99 mg/dL | [facility] |
|                    | performed at [facility];[address]     |               |                 |
|                    | [address];[place]   |               |                 |
|                    | [id number]                    |               |                 |
+--------------------+--------------------------+---------------+-----------------+
 
 
 
+-------------------+------------------+--------------------+--------------+
| Performing        | Address          | City/State/Zipcode | Phone Number |
| Organization      |                  |                    |              |
+-------------------+------------------+--------------------+--------------+
|   [facility] |   [address] | [place] [address] |              |
+-------------------+------------------+--------------------+--------------+
 Septic Lactic Acid (2019 10:12 PM)Only the most recent of 3 results within the time krystian sparks is included.
 
+-------------+-------------------------+------------------+-----------------+
| Component   | Value                   | Ref Range        | Performed At    |
+-------------+-------------------------+------------------+-----------------+
| LACTIC ACID | 2.0Comment: Testing     | 0.4 - 2.0 mmol/L | [facility] |
|             | performed at [facility];[address]    |                  |                 |
|             | [employer];[place], [place]  |                  |                 |
|             | [id number]                   |                  |                 |
+-------------+-------------------------+------------------+-----------------+
 
 
 
+-------------------+------------------+--------------------+--------------+
| Performing        | Address          | City/State/Zipcode | Phone Number |
| Organization      |                  |                    |              |
+-------------------+------------------+--------------------+--------------+
|   [facility] |   [address] | [place] [address] |              |
+-------------------+------------------+--------------------+--------------+
 Blood Culture Set 2 (2019  5:39 PM)Only the most recent of 2 results within the time 
period is included.
 
+----------------------+------------------------+-----------+-----------------+
| Component            | Value                  | Ref Range | Performed At    |
+----------------------+------------------------+-----------+-----------------+
| Specimen Description | BLOOD, PERIPHERAL DRAW |           | [facility]      |
 
|                      |                        |           | LABORATORY      |
+----------------------+------------------------+-----------+-----------------+
| SPECIAL REQUESTS     | R HAND                 |           | [facility] |
+----------------------+------------------------+-----------+-----------------+
| CULTURE              | NO GROWTH 6 DAYS       |           | [facility]      |
|                      |                        |           | LABORATORY      |
+----------------------+------------------------+-----------+-----------------+
 
 
 
+-----------------+
| Specimen        |
+-----------------+
| Blood - Blood,  |
| peripheral draw |
+-----------------+
 
 
 
+-------------------+-------------------------+---------------------+----------------+
| Performing        | Address                 | City/State/Zipcode  | Phone Number   |
| Organization      |                         |                     |                |
+-------------------+-------------------------+---------------------+----------------+
|   [facility]      |   [address]  | [place] [address] |   [phone] |
| LABORATORY        | [address]                   |                     |                |
+-------------------+-------------------------+---------------------+----------------+
|   [facility] |   [address]        | [place] [address]  |                |
+-------------------+-------------------------+---------------------+----------------+
 Sedimentation Rate (ESR) (2019  5:39 PM)Only the most recent of 3 results within the 
time period is included.
 
+-----------+-------------------------+--------------+-----------------+
| Component | Value                   | Ref Range    | Performed At    |
+-----------+-------------------------+--------------+-----------------+
| ESR       | 13Comment: Testing      | 0 - 30 mm/Hr | [facility] |
|           | performed at [facility];[address]    |              |                 |
|           | [address];[place], [place]  |              |                 |
|           | [id number]                   |              |                 |
+-----------+-------------------------+--------------+-----------------+
 
 
 
+----------+
| Specimen |
+----------+
| Blood    |
+----------+
 
 
 
+-------------------+------------------+--------------------+--------------+
| Performing        | Address          | City/State/Zipcode | Phone Number |
| Organization      |                  |                    |              |
+-------------------+------------------+--------------------+--------------+
|   [facility] |   [address] | [place] [address] |              |
+-------------------+------------------+--------------------+--------------+
 CBC with differential (2019  5:39 PM)Only the most recent of 6 results within the rebekah
e period is included.
 
+-------------------+------------------------+-------------------+-----------------+
 
| Component         | Value                  | Ref Range         | Performed At    |
+-------------------+------------------------+-------------------+-----------------+
| WBC               | 5.14                   | 3.80 - 11.00 K/uL | [facility] |
+-------------------+------------------------+-------------------+-----------------+
| RBC               | 2.91 (L)               | 3.70 - 5.10 M/uL  | [facility] |
+-------------------+------------------------+-------------------+-----------------+
| HGB               | 10.1 (L)               | 11.3 - 15.5 g/dL  | [facility] |
+-------------------+------------------------+-------------------+-----------------+
| HCT               | 30.9 (L)               | 34.0 - 46.0 %     | [facility] |
+-------------------+------------------------+-------------------+-----------------+
| MCV               | 106.1 (H)              | 80.0 - 100.0 fl   | [facility] |
+-------------------+------------------------+-------------------+-----------------+
| MCH               | 34.8 (H)               | 27.0 - 34.0 pg    | [facility] |
+-------------------+------------------------+-------------------+-----------------+
| MCHC              | 32.8                   | 32.0 - 35.5 g/dL  | [facility] |
+-------------------+------------------------+-------------------+-----------------+
| RDW SD            | 61.3 (H)               | 37 - 53 fl        | [facility] |
+-------------------+------------------------+-------------------+-----------------+
| PLT               | 145 (L)                | 150 - 400 K/uL    | [facility] |
+-------------------+------------------------+-------------------+-----------------+
| MPV               | 9.3                    | fl                | [facility] |
+-------------------+------------------------+-------------------+-----------------+
| DIFF TYPE         | AUTOMATED              |                   | [facility] |
+-------------------+------------------------+-------------------+-----------------+
| NEUTROPHILS       | 74.03                  | %                 | [facility] |
+-------------------+------------------------+-------------------+-----------------+
| LYMPHOCYTES       | 20.20                  | %                 | [facility] |
+-------------------+------------------------+-------------------+-----------------+
| MONOCYTES         | 5.16                   | %                 | [facility] |
+-------------------+------------------------+-------------------+-----------------+
| EOSINOPHILS       | 0.03                   | %                 | [facility] |
+-------------------+------------------------+-------------------+-----------------+
| BASOPHILS         | 0.58                   | %                 | [facility] |
+-------------------+------------------------+-------------------+-----------------+
| NEUTROPHILS ABS   | 3.81                   | 1.90 - 7.40 K/uL  | [facility] |
+-------------------+------------------------+-------------------+-----------------+
| LYMPHOCYTES ABS   | 1.04                   | 1.00 - 3.90 K/uL  | [facility] |
+-------------------+------------------------+-------------------+-----------------+
| MONOCYTES ABS     | 0.27                   | 0.00 - 0.80 K/uL  | [facility] |
+-------------------+------------------------+-------------------+-----------------+
| EOSINOPHILS ABS   | 0.00                   | 0.00 - 0.50 K/uL  | [facility] |
+-------------------+------------------------+-------------------+-----------------+
| BASOPHILS ABS     | 0.03                   | 0.00 - 0.10 K/uL  | [facility] |
+-------------------+------------------------+-------------------+-----------------+
| MORPHOLOGY        | 1+                     |                   | [facility] |
|                   | Comment:               |                   |                 |
|                   | ANISO                  |                   |                 |
|                   | 1+                     |                   |                 |
|                   | MACRO                  |                   |                 |
|                   | NORMAL PLT MORPH       |                   |                 |
|                   |                        |                   |                 |
+-------------------+------------------------+-------------------+-----------------+
| Platelet Estimate | DECREASEDComment:      |                   | [facility] |
|                   | Testing performed at   |                   |                 |
|                   | [facility];[address]          |                   |                 |
|                   | [address];[place] [address] |                   |                 |
+-------------------+------------------------+-------------------+-----------------+
 
 
 
 
+----------+
| Specimen |
+----------+
| Blood    |
+----------+
 
 
 
+-------------------+------------------+--------------------+--------------+
| Performing        | Address          | City/State/Zipcode | Phone Number |
| Organization      |                  |                    |              |
+-------------------+------------------+--------------------+--------------+
|   [facility] |   [address] | [place] [address] |              |
+-------------------+------------------+--------------------+--------------+
 C-Reactive Protein (2019  5:39 PM)Only the most recent of 3 results within the time krystian sparks is included.
 
+-----------+--------------------------+------------+-----------------+
| Component | Value                    | Ref Range  | Performed At    |
+-----------+--------------------------+------------+-----------------+
| CRP       | 0.8 (H)Comment: Testing  | <0.5 mg/dL | [facility] |
|           | performed at [facility];[address]     |            |                 |
|           | [address];[place]   |            |                 |
|           | [address]                    |            |                 |
+-----------+--------------------------+------------+-----------------+
 
 
 
+----------+
| Specimen |
+----------+
| Blood    |
+----------+
 
 
 
+-------------------+------------------+--------------------+--------------+
| Performing        | Address          | City/State/Zipcode | Phone Number |
| Organization      |                  |                    |              |
+-------------------+------------------+--------------------+--------------+
|   [facility] |   [address] | [place] [address] |              |
+-------------------+------------------+--------------------+--------------+
 TSH (2019  5:39 PM)
 
+-----------+-------------------------+----------------------+-----------------+
| Component | Value                   | Ref Range            | Performed At    |
+-----------+-------------------------+----------------------+-----------------+
| TSH       | 0.904Comment: Testing   | 0.450 - 5.100 uIU/mL | [facility] |
|           | performed at [facility];[address]    |                      |                 |
|           | [address];[place]  |                      |                 |
|           | [address]                   |                      |                 |
+-----------+-------------------------+----------------------+-----------------+
 
 
 
+----------+
| Specimen |
+----------+
| Blood    |
+----------+
 
 
 
 
+-------------------+------------------+--------------------+--------------+
| Performing        | Address          | City/State/Zipcode | Phone Number |
| Organization      |                  |                    |              |
+-------------------+------------------+--------------------+--------------+
|   [facility] |   [address] | [place] [address] |              |
+-------------------+------------------+--------------------+--------------+
 Folate (2019  5:39 PM)
 
+-----------+--------------------------+------------+--------------+
| Component | Value                    | Ref Range  | Performed At |
+-----------+--------------------------+------------+--------------+
| FOLATE    | 8.0Comment: Testing      | >5.4 ng/mL | [facility]   |
|           | performed at [facility] |            | LABORATORY   |
|           |  [facility],        |            |              |
|           | [place]  [address]     |            |              |
+-----------+--------------------------+------------+--------------+
 
 
 
+----------+
| Specimen |
+----------+
| Blood    |
+----------+
 
 
 
+---------------+-------------------------+---------------------+----------------+
| Performing    | Address                 | City/State/Zipcode  | Phone Number   |
| Organization  |                         |                     |                |
+---------------+-------------------------+---------------------+----------------+
|   [facility]  |   [address]  | [place] [address] |   [phone] |
| LABORATORY    | [address]                   |                     |                |
+---------------+-------------------------+---------------------+----------------+
 Vitamin B12 (2019  5:39 PM)
 
+-------------+--------------------------+-------------------+--------------+
| Component   | Value                    | Ref Range         | Performed At |
+-------------+--------------------------+-------------------+--------------+
| VITAMIN B12 | 553Comment: Testing      | 254 - 1,320 pg/mL | [facility]   |
|             | performed at [facility], [address] |                   | LABORATORY   |
|             |  [address],        |                   |              |
|             | [place]  [address]     |                   |              |
+-------------+--------------------------+-------------------+--------------+
 
 
 
+----------+
| Specimen |
+----------+
| Blood    |
+----------+
 
 
 
+---------------+-------------------------+---------------------+----------------+
| Performing    | Address                 | City/State/Zipcode  | Phone Number   |
 
| Organization  |                         |                     |                |
+---------------+-------------------------+---------------------+----------------+
|   [facility]  |   [address]  | [place] [address] |   [phone] |
| LABORATORY    | [address]                   |                     |                |
+---------------+-------------------------+---------------------+----------------+
 Comprehensive metabolic panel (2019  5:39 PM)Only the most recent of 5 results within
 the time period is included.
 
+----------------+--------------------------+-------------------+-----------------+
| Component      | Value                    | Ref Range         | Performed At    |
+----------------+--------------------------+-------------------+-----------------+
| SODIUM         | 143                      | 135 - 145 mmol/L  | [facility] |
+----------------+--------------------------+-------------------+-----------------+
| POTASSIUM      | 4.7                      | 3.5 - 4.9 mmol/L  | [facility] |
+----------------+--------------------------+-------------------+-----------------+
| CHLORIDE       | 96 (L)                   | 99 - 109 mmol/L   | [facility] |
+----------------+--------------------------+-------------------+-----------------+
| CO2            | >40 (HH)Comment: CALLED  | 23 - 32 mmol/L    | [facility] |
|                | NURSING UNITREAD BACK    |                   |                 |
|                | RESULTS VERIFIEDCALLED   |                   |                 |
|                | TO RN IN ED AT 1830 BY   |                   |                 |
|                | [facility]                      |                   |                 |
|                |                          |                   |                 |
+----------------+--------------------------+-------------------+-----------------+
| ANION GAP AGAP | UNABLE TO CALCULATE      | 5 - 20 mmol/L     | [facility] |
+----------------+--------------------------+-------------------+-----------------+
| GLUCOSE        | 160 (H)                  | 65 - 99 mg/dL     | [facility] |
+----------------+--------------------------+-------------------+-----------------+
| BUN            | 9                        | 8 - 25 mg/dL      | [facility] |
+----------------+--------------------------+-------------------+-----------------+
| CREATININE     | 2.96 (H)                 | 0.50 - 1.00 mg/dL | [facility] |
+----------------+--------------------------+-------------------+-----------------+
| BUN/CREAT      | 3                        |                   | [facility] |
+----------------+--------------------------+-------------------+-----------------+
| CALCIUM        | 9.4                      | 8.5 - 10.5 mg/dL  | [facility] |
+----------------+--------------------------+-------------------+-----------------+
| TOTAL PROTEIN  | 6.7                      | 6.3 - 8.2 g/dL    | [facility] |
+----------------+--------------------------+-------------------+-----------------+
| Albumin        | 4.3                      | 3.3 - 4.8 g/dL    | [facility] |
+----------------+--------------------------+-------------------+-----------------+
| GLOBULIN       | 2.4                      | 1.3 - 4.9 g/dL    | [facility] |
+----------------+--------------------------+-------------------+-----------------+
| A/G            | 1.8                      | 1.0 - 2.4         | [facility] |
+----------------+--------------------------+-------------------+-----------------+
| TBIL           | 0.5                      | 0.1 - 1.5 mg/dL   | [facility] |
+----------------+--------------------------+-------------------+-----------------+
| ALK PHOS       | 103                      | 35 - 115 U/L      | [facility] |
+----------------+--------------------------+-------------------+-----------------+
| AST            | 54 (H)                   | 10 - 45 U/L       | [facility] |
+----------------+--------------------------+-------------------+-----------------+
| ALT            | 40                       | 10 - 65 U/L       | [facility] |
+----------------+--------------------------+-------------------+-----------------+
| EGFR           | 16 (L)Comment: GFR <60:  | >60 mL/min/1.73m2 | [facility] |
|                | CHRONIC KIDNEY DISEASE,  |                   |                 |
|                | IF FOUND OVER A 3 MONTH  |                   |                 |
|                | PERIOD.GFR <15: KIDNEY   |                   |                 |
|                | FAILURE.FOR       |                   |                 |
|                | [employer]IC[employer], MULTIPLY THE  |                   |                 |
|                | CALCULATED GFR BY        |                   |                 |
|                | 1.210.This eGFR is       |                   |                 |
 
|                | calculated using the     |                   |                 |
|                | [facility]D [facility] traceable      |                   |                 |
|                | equation.Testing         |                   |                 |
|                | performed at [facility]     |                   |                 |
|                | [address] [facility];[place], [place]   |                   |                 |
|                | [address]                    |                   |                 |
+----------------+--------------------------+-------------------+-----------------+
 
 
 
+----------+
| Specimen |
+----------+
| Blood    |
+----------+
 
 
 
+-------------------+------------------+--------------------+--------------+
| Performing        | Address          | City/State/Zipcode | Phone Number |
| Organization      |                  |                    |              |
+-------------------+------------------+--------------------+--------------+
|   [facility] |   [address] | [place] [address] |              |
+-------------------+------------------+--------------------+--------------+
 XR Toe Right 2 View (2019  5:08 PM)
 
+----------------------------------------------------------------------+--------------+
| Impressions                                                          | Performed At |
+----------------------------------------------------------------------+--------------+
|   No radiographic evidence of osteomyelitis seen.    If further      |   [facility]     |
| assessment  is warranted, consider correlation with MRI.  Potential  | RADIOLOGY    |
| acute fracture through the base of the distal phalanx.  Signed by:   |              |
| Gavin South  Sign Date/Time: 2019 5:15 PM                      |              |
+----------------------------------------------------------------------+--------------+
 
 
 
+------------------------------------------------------------------------+--------------+
| Narrative                                                              | Performed At |
+------------------------------------------------------------------------+--------------+
|   RIGHT TOE  CLINICAL INFORMATION:  Other (see comments) Pain, concern |   [facility]     |
|  for osteomyelitis.  COMPARISON:  XR FOOT LEFT (2018); XR FOOT   | RADIOLOGY    |
| LEFT (2018); XR FOOT LEFT  (2018);  FINDINGS:  Advanced     |              |
| degenerative changes identified involving the interphalangeal  joint   |              |
| of the 1st digit.    On the lateral view, there appears to be  lucency |              |
|  through the base of the phalanx, potentially reflecting an  acute     |              |
| fracture.    No erosive or destructive changes appreciated to  suggest |              |
|  osteomyelitis.                                                        |              |
+------------------------------------------------------------------------+--------------+
 
 
 
+------------------------------------------------------------------------+
| Procedure Note                                                         |
+------------------------------------------------------------------------+
|   Denny, Rad Results In - 2019  5:18 PM PST  RIGHT TOE             |
| CLINICAL INFORMATION:                                                  |
| Other (see comments) Pain, concern for osteomyelitis.                  |
| COMPARISON:                                                            |
| XR FOOT LEFT (2018); XR FOOT LEFT (2018); XR FOOT LEFT     |
 
| (2018);                                                           |
| FINDINGS:                                                              |
| Advanced degenerative changes identified involving the interphalangeal |
| joint of the 1st digit.  On the lateral view, there appears to be      |
| lucency through the base of the phalanx, potentially reflecting an     |
| acute fracture.  No erosive or destructive changes appreciated to      |
| suggest osteomyelitis.                                                 |
| IMPRESSION:                                                            |
| No radiographic evidence of osteomyelitis seen.  If further assessment |
| is warranted, consider correlation with MRI.                           |
| Potential acute fracture through the base of the distal phalanx.       |
| Signed by: Habbu, Gavin                                                 |
| Sign Date/Time: 2019 5:15 PM                                     |
+------------------------------------------------------------------------+
 
 
 
+--------------------+------------------+--------------------+--------------+
| Performing         | Address          | City/State/Zipcode | Phone Number |
| Organization       |                  |                    |              |
+--------------------+------------------+--------------------+--------------+
|   [facility] |   [address] | [place] [address] |              |
+--------------------+------------------+--------------------+--------------+
 Blood Culture Set 1 (2019  4:55 PM)Only the most recent of 2 results within the time 
period is included.
 
+----------------------+------------------+-----------+-----------------+
| Component            | Value            | Ref Range | Performed At    |
+----------------------+------------------+-----------+-----------------+
| Specimen Description | BLOOD, LINE DRAW |           | [facility]      |
|                      |                  |           | LABORATORY      |
+----------------------+------------------+-----------+-----------------+
| SPECIAL REQUESTS     | R FOREARM        |           | [facility] |
+----------------------+------------------+-----------+-----------------+
| CULTURE              | NO GROWTH 6 DAYS |           | [facility]      |
|                      |                  |           | LABORATORY      |
+----------------------+------------------+-----------+-----------------+
 
 
 
+---------------------+
| Specimen            |
+---------------------+
| Blood - Blood Line  |
| Draw                |
+---------------------+
 
 
 
+-------------------+-------------------------+---------------------+----------------+
| Performing        | Address                 | City/State/Zipcode  | Phone Number   |
| Organization      |                         |                     |                |
+-------------------+-------------------------+---------------------+----------------+
|   [facility]      |   [address] [place] [address]  | [place] [address] |   [phone] |
| LABORATORY        | [address]vd[address]                   |                     |                |
+-------------------+-------------------------+---------------------+----------------+
|   [facility] |   [address]        | [place] [address]  |                |
+-------------------+-------------------------+---------------------+----------------+
 ED INFORMATION EXCHANGE (2019  3:21 PM)Only the most recent of 4 results within the  period is included.
 
 
+------------------------------------------------------------------------+--------------+
| Narrative                                                              | Performed At |
+------------------------------------------------------------------------+--------------+
|   [id number]     Criteria Met         Care        |   ED         |
| Guidelines      10 in      Security and Safety  No recent Security   | INFORMATION  |
| Events currently on file     ED Care Guidelines from [employer] -        | EXCHANGE     |
| [place]  Last Updated: 18 10:16 AM         Other Information:    |              |
| Currently being seen by [facility] in [place] for mental health        |              |
| [phone]  These are guidelines and the provider should    |              |
| exercise clinical judgment when providing care.  ED Care Guidelines    |              |
| from [facility]  Last Updated: 17 10:44 AM         Care |              |
|  Coordination:  This patient has been identified as having at          |              |
| leastEmergency Departments visits in the 12 months immediately         |              |
| preceding the date these guidelines were entered.Patient requires      |              |
| education on appropriate ED usage.Emphasize the importance of using    |              |
| outpatient   medical services for the treatment of chronic conditions. |              |
|   Please contact Community Health WorkerWillard at [phone] if   |              |
| patient is seen in ED.  These are guidelines and the provider should   |              |
| exercise clinical judgment when providing care.  These are guidelines  |              |
| and the provider should exercise clinical judgment when providing      |              |
| care.           Prescription Drug Report (12 Mo.)  PDMP query found no |              |
|  report.        E.D. Visit Count (12 mo.)  Facility Visits Low Acuity  |              |
|   [facility] 18 0   [facility] 2 0   [facility]   |              |
| [facility] 5 0   Total 25 0   Note: Visits indicate total |              |
|  known visits. Medicaid Low Acuity Dx are the number of primary        |              |
| diagnoses on the Medicaid's Low Acuity dx list.         Recent         |              |
| Emergency Department Visit Summary  Showing 10 most recent visits out  |              |
| of 25 in the past 12 months  Date Facility City State Type Diagnoses   |              |
| or Chief Complaint   2019 [facility] [place]       |              |
| Emergency       2019 [facility] [place]            |              |
| Emergency          Multiple Falls        Referral        Circulatory   |              |
| Problem      2019 [facility] [place] Emergency      |              |
|      ABD PAIN        Other chest pain      2019 [facility]         |              |
| [facility] [place] Emergency          Foot Pain      [facility]              [facility]          Chest Pain          |              |
| Nausea        Shortness of Breath        Chest pain, unspecified       |              |
|      Elevated blood-pressure reading, without diagnosis of             |              |
| hypertension        Presence of aortocoronary bypass graft             |              |
| Other specified postprocedural states      2019 [facility]  |              |
| [facility] [place] Emergency          CHEST PAIN        Abnormal levels  |              |
| of other serum enzymes        Personal history of other diseases of    |              |
| the circulatory system        Chest pain, unspecified      2019 |              |
|  [facility] [place] Emergency          FISTULA BLEEDING    |              |
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
| grafts, initial encounter      Dec 22, 2018 [facility]       |              |
| [place] Emergency          CHEST PAIN        Type 2 diabetes         |              |
| mellitus with hyperglycemia        Chest pain, unspecified      Nov    |              |
| 2018 [facility] [place] [place] Emergency    Chief Complaint:   |              |
| SOB      2018 [facility] [place] Emergency         |              |
|     FALL        Unspecified fall, initial encounter        Dependence  |              |
| on renal dialysis        End stage renal disease            Recent     |              |
| Inpatient Visit Summary  Showing 10 most recent visits out of 11 in    |              |
| the past 12 months  Date Facility City State Type Diagnoses or Chief   |              |
| Complaint   2019 [facility] [place] Vascular       |              |
| Surgery          Foot Pain        End stage renal disease              |              |
| Dependence on renal dialysis        Peripheral vascular disease,       |              |
| unspecified        Anemia in chronic kidney disease        Other       |              |
| disorders of plasma-protein metabolism, not elsewhere classified       |              |
|      Other specified personal risk factors, not elsewhere classified   |              |
 
|     Dec 27, 2018 [facility] [place] Recovery               |              |
|     Peripheral arterial disease, osteomyelitis left foot        Other  |              |
| acute osteomyelitis, left ankle and foot        Long term (current)    |              |
| use of antibiotics        End stage renal disease        Anemia in     |              |
| chronic kidney disease      Dec 5, 2018 [facility] [place] |              |
|  General Medicine          Peripheral vascular disease, unspecified    |              |
|      End stage renal disease        Dependence on renal dialysis       |              |
|      Long term (current) use of insulin        Other chronic           |              |
| osteomyelitis, left ankle and foot        Type 2 diabetes mellitus     |              |
| with diabetic chronic kidney disease        Essential (primary)        |              |
| hypertension      2018 [facility] [place]. [place]          |              |
| Inpatient          Upper GIB        Other chronic osteomyelitis,       |              |
| unspecified ankle and foot        End stage renal disease        Other |              |
|  specified personal risk factors, not elsewhere classified             |              |
| Chronic systolic (congestive) heart failure        Anemia in chronic   |              |
| kidney disease        Other disorders of plasma-protein metabolism,    |              |
| not elsewhere classified        Moderate protein-calorie malnutrition  |              |
|        Other disorders of phosphorus metabolism      2018      |              |
| [facility] [place] [place] [facility]          1. Type 2      |              |
| diabetes mellitus with other specified complication        2. Urinary  |              |
| tract infection, site not specified        3. Unspecified              |              |
| protein-calorie malnutrition        4. Other chronic osteomyelitis,    |              |
| unspecified site        5. Hypertensive heart and chronic kidney       |              |
| disease with heart failure and with stage 5 chronic kidney disease, or |              |
|  end stage renal disease        6. Chronic diastolic (congestive)      |              |
| heart failure        7. Other osteomyelitis, ankle and foot        8.  |              |
| Type 2 diabetes mellitus with foot ulcer        9. Atherosclerotic     |              |
| heart disease of native coronary artery without angina pectoris        |              |
|  10. Chronic obstructive pulmonary disease, unspecified      ,    |              |
|  [facility] [place] Medical Surgical          1. Benign |              |
|  paroxysmal vertigo, unspecified ear        2. End stage renal disease |              |
|         3. Hypertensive chronic kidney disease with stage 5 chronic    |              |
| kidney disease or end stage renal disease        4. Urinary tract      |              |
| infection, site not specified        5. Hypertensive heart and chronic |              |
|  kidney disease with heart failure and with stage 5 chronic kidney     |              |
| disease, or end stage renal disease        6. Kidney transplant status |              |
|         7. Unspecified systolic (congestive) heart failure        8.   |              |
| Syncope and collapse        9. Type 2 diabetes mellitus with diabetic  |              |
| chronic kidney disease        10. Atherosclerotic heart disease of     |              |
| native coronary artery without angina pectoris      Sep 15, 2018       |              |
| [facility] [place] Medical Surgical          Acute     |              |
| ischemic heart disease, unspecified        Long term (current) use of  |              |
| insulin        Dependence on renal dialysis        End stage renal     |              |
| disease        Type 2 diabetes mellitus with diabetic chronic kidney   |              |
| disease        Essential (primary) hypertension        Anemia in       |              |
| chronic kidney disease      2018 [facility] |              |
|  Medical Surgical          0. Cough        1. Pneumonia due to         |              |
| Pseudomonas        2. End stage renal disease        3. Hypertensive   |              |
| heart and chronic kidney disease with heart failure and with stage 5   |              |
| chronic kidney disease, or end stage renal disease        4. Cachexia  |              |
|        5. Chronic obstructive pulmonary disease with acute lower       |              |
| respiratory infection        6. Type 2 diabetes mellitus with diabetic |              |
|  chronic kidney disease        7. Heart failure, unspecified        8. |              |
|  Hyperlipidemia, unspecified        9. Gastro-esophageal reflux        |              |
| disease without esophagitis      2018 [facility].       |              |
| [facility] Medical Surgical          0. Other chest pain        1.      |              |
| Gastro-esophageal reflux disease without esophagitis        2. End     |              |
| stage renal disease        3. Hypertensive heart and chronic kidney    |              |
| disease with heart failure and with stage 5 chronic kidney disease, or |              |
|  end stage renal disease        4. Chronic systolic (congestive) heart |              |
 
|  failure        5. Atherosclerotic heart disease of native coronary    |              |
| artery with other forms of angina pectoris        6. Type 2 diabetes   |              |
| mellitus with diabetic chronic kidney disease        7.                |              |
| Hyperlipidemia, unspecified        8. Major depressive disorder,       |              |
| single episode, unspecified        9. Chronic obstructive pulmonary    |              |
| disease, unspecified      Mar 6, 2018 [facility]     |              |
| [facility] Cardiology          Heart Failure        Need for iv access  |              |
|        Type 2 diabetes mellitus with other specified complication      |              |
|      Long term (current) use of insulin        Paroxysmal atrial       |              |
| fibrillation        Anemia in chronic kidney disease                   |              |
| Atherosclerotic heart disease of native coronary artery without angina |              |
|  pectoris        Cardiomyopathy, unspecified        End stage renal    |              |
| disease        Other specified postprocedural states            Care   |              |
| Providers  Provider PRC Type Phone Fax Service Dates   HEADINGS, SANTIAGO, |              |
|  F.N.P. Nurse Practitioner: Family     Current      Marco Antonio Martinez     |              |
| /Care Coordinator [phone]    2019 -          |              |
| Current      [facility] Primary [facility] [phone]    2019 |              |
|  - Current      [facility] Primary            |              |
| Care    [phone] Current      [facility]              |
| [facility]     Current      MANOLO GONZALEZ Primary Care         |              |
| Current      [facility] Provider [phone] |              |
|  [phone] Current      [facility] Case or Care Manager     Current      |              |
|  MIRTA Goel Case or Care Manager [phone]  |              |
| [phone] Current      Jairo Gutierrez MD Other     Current     |              |
|      [employer] [facility]  This patient has registered at the [facility]      |              |
| [facility] Emergency Department   For more information    |              |
| visit:                                                                 |              |
| [email or web address] |              |
| [id number]   The above information is provided for the sole purpose of     |              |
| patient treatment. Use of this information beyond the terms of Data    |              |
| Sharing Memorandum of Understanding and License Agreement is           |              |
| prohibited. In certain cases not all visits may be represented.        |              |
| Consult the aforementioned facilities for additional information.      |              |
| 2019 [employer] - [place], [place] -      |              |
| [email or web address]                                         |              |
+------------------------------------------------------------------------+--------------+
 
 
 
+-------------------------------------------------------------------------------------------
--------------------------------------------------------------------------------------------
--------------------+
| Procedure Note                                                                            
                                                                                            
                    |
+-------------------------------------------------------------------------------------------
--------------------------------------------------------------------------------------------
--------------------+
|   Interface, Lab - 2019  3:22 PM PST  Formatting of this note may be different      
                                                                                            
                    |
| from the original.[id number]  Care Guidelines  10 in     
                                                                                            
                    |
| 12Security and SafetyNo recent Security Events currently on fileED Care Guidelines from   
                                                                                            
                    |
| [employer] [facility] Updated: 18 10:16 AM  Other Information:Currently being      
                                                                                            
                    |
 
| seen by [employer] in [place] for mental health concerns.[phone] are            
                                                                                            
                    |
| guidelines and the provider should exercise clinical judgment when providing care.ED      
                                                                                            
                    |
| Care Guidelines from [facility] Updated: 17 10:44 AM  Care             
                                                                                            
                    |
| Coordination:This patient has been identified as having at leastEmergency Departments     
                                                                                            
                    |
| visits in the 12 months immediately preceding the date these guidelines were              
                                                                                            
                    |
| entered.Patient requires education on appropriate ED usage.Emphasize the importance of    
                                                                                            
                    |
| using outpatient medical services for the treatment of chronic conditions.Please contact  
                                                                                            
                    |
|  Community Health WorkerWillard at [phone] if patient is seen in ED.These are      
                                                                                            
                    |
| guidelines and the provider should exercise clinical judgment when providing care.These   
                                                                                            
                    |
| are guidelines and the provider should exercise clinical judgment when providing          
                                                                                            
                    |
| care.Prescription Drug Report (12 Mo.)PDMP query found no report.E.D. Visit Count (12     
                                                                                            
                    |
| mo.)Facility Visits Low Acuity [facility] 18 0 [facility] 2 0    
                                                                                            
                    |
| [facility] 5 0 Total 25 0 Note: Visits indicate total known visits.   
                                                                                            
                    |
| Medicaid Low Acuity Dx are the number of primary diagnoses on the Medicaid's Low Acuity   
                                                                                            
                    |
| dx list.  Recent Emergency Department Visit SummaryShowing 10 most recent visits out of   
                                                                                            
                    |
| 25 in the past 12 monthsDate Facility City State Type Diagnoses or Chief Complaint Feb    
                                                                                            
                    |
| 2019 [facility]. [place] Emergency   2019 [facility]     
                                                                                            
                    |
| [facility]. [place] Emergency    Multiple Falls    Referral    Circulatory Problem  2019     
                                                                                            
                    |
| [facility] [place] Emergency    ABD PAIN    Other chest pain  2019    
                                                                                            
                    |
| [facility]. [place] Emergency    Foot Pain  2019 [facility]  
                                                                                            
                    |
 
|  [facility]. [place] Emergency    Chest Pain    Nausea    Shortness of Breath    Chest pain,        
                                                                                            
                    |
| unspecified    Elevated blood-pressure reading, without diagnosis of hypertension         
                                                                                            
                    |
| Presence of aortocoronary bypass graft    Other specified postprocedural states    
                                                                                            
                    |
|   [facility] [place]. [place] Emergency    CHEST PAIN    Abnormal levels of other  
                                                                                            
                    |
|  serum enzymes    Personal history of other diseases of the circulatory system    Chest   
                                                                                            
                    |
| pain, unspecified  2019 [facility] [place]. [place] Emergency    FISTULA        
                                                                                            
                    |
| BLEEDING    Hemorrhage due to vascular prosthetic devices, implants and grafts, initial   
                                                                                            
                    |
| encounter  Dec 22, 2018 [facility] [place] Emergency    CHEST PAIN    Type 2  
                                                                                            
                    |
|  diabetes mellitus with hyperglycemia    Chest pain, unspecified  2018 [facility]      
                                                                                            
                    |
| [facility] [place] [place] [facility]  Chief Complaint: SOB  2018 [facility]       
                                                                                            
                    |
| [facility] [place]. [place] Emergency    FALL    Unspecified fall, initial encounter    Dependence   
                                                                                            
                    |
| on renal dialysis    End stage renal disease  Recent Inpatient Visit SummaryShowing 10    
                                                                                            
                    |
| most recent visits out of 11 in the past 12 monthsDate Facility City State Type           
                                                                                            
                    |
| Diagnoses or Chief Complaint 2019 [facility] [place] Vascular         
                                                                                            
                    |
| Surgery    Foot Pain    End stage renal disease    Dependence on renal dialysis           
                                                                                            
                    |
| Peripheral vascular disease, unspecified    Anemia in chronic kidney disease    Other     
                                                                                            
                    |
| disorders of plasma-protein metabolism, not elsewhere classified    Other specified       
                                                                                            
                    |
| personal risk factors, not elsewhere classified  Dec 27, 2018 [facility] [place]        
                                                                                            
                    |
| [place] Recovery    Peripheral arterial disease, osteomyelitis left foot    Other       
                                                                                            
                    |
| acute osteomyelitis, left ankle and foot    Long term (current) use of antibiotics        
                                                                                            
                    |
 
| End stage renal disease    Anemia in chronic kidney disease  Dec 5, 2018 [facility]  
                                                                                            
                    |
|  [place] General Medicine    Peripheral vascular disease, unspecified    End       
                                                                                            
                    |
| stage renal disease    Dependence on renal dialysis    Long term (current) use of         
                                                                                            
                    |
| insulin    Other chronic osteomyelitis, left ankle and foot    Type 2 diabetes mellitus   
                                                                                            
                    |
| with diabetic chronic kidney disease    Essential (primary) hypertension  2018    
                                                                                            
                    |
| [facility] [place] Inpatient    Upper GIB    Other chronic osteomyelitis,     
                                                                                            
                    |
| unspecified ankle and foot    End stage renal disease    Other specified personal risk    
                                                                                            
                    |
| factors, not elsewhere classified    Chronic systolic (congestive) heart failure          
                                                                                            
                    |
| Anemia in chronic kidney disease    Other disorders of plasma-protein metabolism, not     
                                                                                            
                    |
| elsewhere classified    Moderate protein-calorie malnutrition    Other disorders of       
                                                                                            
                    |
| phosphorus metabolism  2018 [facility] [place]. [place] Medical Surgical    1.  
                                                                                            
                    |
|  Type 2 diabetes mellitus with other specified complication    2. Urinary tract           
                                                                                            
                    |
| infection, site not specified    3. Unspecified protein-calorie malnutrition    4. Other  
                                                                                            
                    |
|  chronic osteomyelitis, unspecified site    5. Hypertensive heart and chronic kidney      
                                                                                            
                    |
| disease with heart failure and with stage 5 chronic kidney disease, or end stage renal    
                                                                                            
                    |
| disease    6. Chronic diastolic (congestive) heart failure    7. Other osteomyelitis,     
                                                                                            
                    |
| ankle and foot    8. Type 2 diabetes mellitus with foot ulcer    9. Atherosclerotic       
                                                                                            
                    |
| heart disease of native coronary artery without angina pectoris    10. Chronic            
                                                                                            
                    |
| obstructive pulmonary disease, unspecified  2018 [facility] [place]. [place]     
                                                                                            
                    |
| Medical Surgical    1. Benign paroxysmal vertigo, unspecified ear    2. End stage renal   
                                                                                            
                    |
 
| disease    3. Hypertensive chronic kidney disease with stage 5 chronic kidney disease or  
                                                                                            
                    |
|  end stage renal disease    4. Urinary tract infection, site not specified    5.          
                                                                                            
                    |
| Hypertensive heart and chronic kidney disease with heart failure and with stage 5         
                                                                                            
                    |
| chronic kidney disease, or end stage renal disease    6. Kidney transplant status    7.   
                                                                                            
                    |
| Unspecified systolic (congestive) heart failure    8. Syncope and collapse    9. Type 2   
                                                                                            
                    |
| diabetes mellitus with diabetic chronic kidney disease    10. Atherosclerotic heart       
                                                                                            
                    |
| disease of native coronary artery without angina pectoris  Sep 15, 2018 [facility]    
                                                                                            
                    |
| [place]. [place] Medical Surgical    Acute ischemic heart disease, unspecified         
                                                                                            
                    |
| Long term (current) use of insulin    Dependence on renal dialysis    End stage renal     
                                                                                            
                    |
| disease    Type 2 diabetes mellitus with diabetic chronic kidney disease    Essential     
                                                                                            
                    |
| (primary) hypertension    Anemia in chronic kidney disease  2018 [facility]            
                                                                                            
                    |
| [place]. [place] Medical Surgical    0. Cough    1. Pneumonia due to Pseudomonas   
                                                                                            
                    |
|    2. End stage renal disease    3. Hypertensive heart and chronic kidney disease with    
                                                                                            
                    |
| heart failure and with stage 5 chronic kidney disease, or end stage renal disease    4.   
                                                                                            
                    |
| Cachexia    5. Chronic obstructive pulmonary disease with acute lower respiratory         
                                                                                            
                    |
| infection    6. Type 2 diabetes mellitus with diabetic chronic kidney disease    7.       
                                                                                            
                    |
| Heart failure, unspecified    8. Hyperlipidemia, unspecified    9. Gastro-esophageal      
                                                                                            
                    |
| reflux disease without esophagitis  [facility] [place] [place] [facility]     
                                                                                            
                    |
| Surgical    0. Other chest pain    1. Gastro-esophageal reflux disease without            
                                                                                            
                    |
| esophagitis    2. End stage renal disease    3. Hypertensive heart and chronic kidney     
                                                                                            
                    |
 
| disease with heart failure and with stage 5 chronic kidney disease, or end stage renal    
                                                                                            
                    |
| disease    4. Chronic systolic (congestive) heart failure    5. Atherosclerotic heart     
                                                                                            
                    |
| disease of native coronary artery with other forms of angina pectoris    6. Type 2        
                                                                                            
                    |
| diabetes mellitus with diabetic chronic kidney disease    7. Hyperlipidemia, unspecified  
                                                                                            
                    |
|     8. Major depressive disorder, single episode, unspecified    9. Chronic obstructive   
                                                                                            
                    |
| pulmonary disease, unspecified  Mar 6, 2018 [facility] [place]. [place]        
                                                                                            
                    |
| Cardiology    Heart Failure    Need for iv access    Type 2 diabetes mellitus with other  
                                                                                            
                    |
|  specified complication    Long term (current) use of insulin    Paroxysmal atrial        
                                                                                            
                    |
| fibrillation    Anemia in chronic kidney disease    Atherosclerotic heart disease of      
                                                                                            
                    |
| native coronary artery without angina pectoris    Cardiomyopathy, unspecified    End      
                                                                                            
                    |
| stage renal disease    Other specified postprocedural states  Care ProvidersProvider [facility]  
                                                                                            
                    |
|  Type Phone Fax Service Dates HEADINGS, ELZA HENDERSON Nurse Practitioner: Family           
                                                                                            
                    |
| Current  Marco Antonio Martinez /Care Coordinator [phone]  2019 -       
                                                                                            
                    |
| Current  [facility] Martinez Primary Care [phone]  2019 - Current  [facility]        
                                                                                            
                    |
| [facility] Primary [facility]  [phone] Current  [facility]  
                                                                                            
                    |
|  [facility] Primary Care   Current  MANOLO GONZALEZ Primary Care   [facility]  [facility]    
                                                                                            
                    |
| [facility] Mental Health Provider [phone] Current  [employer] Case or Care  
                                                                                            
                    |
|  Manager   Current  MIRTA Goel Case or Care Manager [phone]  
                                                                                            
                    |
|  [phone] Current  Jairo Gutierrez MD Other   Current  [employer]      
                                                                                            
                    |
| patient has registered at the [facility] Emergency Department For     
                                                                                            
                    |
 
| more information visit:                                                                   
                                                                                            
                    |
| [email or web address] The above    
                                                                                            
                    |
| information is provided for the sole purpose of patient treatment. Use of this            
                                                                                            
                    |
| information beyond the terms of Data Sharing Memorandum of Understanding and License      
                                                                                            
                    |
| Agreement is prohibited. In certain cases not all visits may be represented. Consult the  
                                                                                            
                    |
|  aforementioned facilities for additional information. 2019 [employer]            
                                                                                            
                    |
| [employer] - [place], [place] - [email or web address]                  
                                                                                            
                    |
|   End stage renal disease                                                                 
                                                                                            
                    |
|   Dependence on renal dialysis                                                            
                                                                                            
                    |
|   Long term (current) use of insulin                                                      
                                                                                            
                    |
|   Other chronic osteomyelitis, left ankle and foot                                        
                                                                                            
                    |
|   Type 2 diabetes mellitus with diabetic chronic kidney disease                           
                                                                                            
                    |
|   Essential (primary) hypertension                                                        
                                                                                            
                    |
|                                                                                           
                                                                                            
                    |
|[facility] [place] [facility]                                      
                                                                                            
                    |
|  Upper GIB                                                                                
                                                                                            
                    |
|   Other chronic osteomyelitis, unspecified ankle and foot                                 
                                                                                            
                    |
|   End stage renal disease                                                                 
                                                                                            
                    |
|   Other specified personal risk factors, not elsewhere classified                         
                                                                                            
                    |
|   Chronic systolic (congestive) heart failure                                             
                                                                                            
                    |
 
|   Anemia in chronic kidney disease                                                        
                                                                                            
                    |
|   Other disorders of plasma-protein metabolism, not elsewhere classified                  
                                                                                            
                    |
|   Moderate protein-calorie malnutrition                                                   
                                                                                            
                    |
|   Other disorders of phosphorus metabolism                                                
                                                                                            
                    |
|                                                                                           
                                                                                            
                    |
|2018 [facility] [place]. [place] Medical Surgical                                
                                                                                            
                    |
|  1. Type 2 diabetes mellitus with other specified complication                            
                                                                                            
                    |
|   2. Urinary tract infection, site not specified                                          
                                                                                            
                    |
|   3. Unspecified protein-calorie malnutrition                                             
                                                                                            
                    |
|   4. Other chronic osteomyelitis, unspecified site                                        
                                                                                            
                    |
|   5. 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
onic kidney disease, or end stage renal disease                                             
                    |
|   6. Chronic diastolic (congestive) heart failure                                         
                                                                                            
                    |
|   7. Other osteomyelitis, ankle and foot                                                  
                                                                                            
                    |
|   8. Type 2 diabetes mellitus with foot ulcer                                             
                                                                                            
                    |
|   9. Atherosclerotic heart disease of native coronary artery without angina pectoris      
                                                                                            
                    |
|   10. Chronic obstructive pulmonary disease, unspecified                                  
                                                                                            
                    |
|                                                                                           
                                                                                            
                    |
|[address] [facility] Akash Cox. [place] [facility]                                 
                                                                                            
                    |
|  1. Benign paroxysmal vertigo, unspecified ear                                            
                                                                                            
                    |
|   2. End stage renal disease                                                              
                                                                                            
                    |
 
|   3. Hypertensive chronic kidney disease with stage 5 chronic kidney disease or end stage 
renal disease                                                                               
                    |
|   4. Urinary tract infection, site not specified                                          
                                                                                            
                    |
|   5. 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
onic kidney disease, or end stage renal disease                                             
                    |
|   6. Kidney transplant status                                                             
                                                                                            
                    |
|   7. Unspecified systolic (congestive) heart failure                                      
                                                                                            
                    |
|   8. Syncope and collapse                                                                 
                                                                                            
                    |
|   9. Type 2 diabetes mellitus with diabetic chronic kidney disease                        
                                                                                            
                    |
|   10. Atherosclerotic heart disease of native coronary artery without angina pectoris     
                                                                                            
                    |
|                                                                                           
                                                                                            
                    |
|Sep 15, 2018 [facility] JANEEN Vivar. [place] [facility]                           
                                                                                            
                    |
|  Acute ischemic heart disease, unspecified                                                
                                                                                            
                    |
|   Long term (current) use of insulin                                                      
                                                                                            
                    |
|   Dependence on renal dialysis                                                            
                                                                                            
                    |
|   End stage renal disease                                                                 
                                                                                            
                    |
|   Type 2 diabetes mellitus with diabetic chronic kidney disease                           
                                                                                            
                    |
|   Essential (primary) hypertension                                                        
                                                                                            
                    |
|   Anemia in chronic kidney disease                                                        
                                                                                            
                    |
|                                                                                           
                                                                                            
                    |
|2018 [facility] Ak[facility] [place]. [place] [facility]                                
                                                                                            
                    |
|  0. Cough                                                                                 
                                                                                            
                    |
 
|   1. Pneumonia due to Pseudomonas                                                         
                                                                                            
                    |
|   2. End stage renal disease                                                              
                                                                                            
                    |
|   3. 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
onic kidney disease, or end stage renal disease                                             
                    |
|   4. Cachexia                                                                             
                                                                                            
                    |
|   5. Chronic obstructive pulmonary disease with acute lower respiratory infection         
                                                                                            
                    |
|   6. Type 2 diabetes mellitus with diabetic chronic kidney disease                        
                                                                                            
                    |
|   7. Heart failure, unspecified                                                           
                                                                                            
                    |
|   8. Hyperlipidemia, unspecified                                                          
                                                                                            
                    |
|   9. Gastro-esophageal reflux disease without esophagitis                                 
                                                                                            
                    |
|                                                                                           
                                                                                            
                    |
|2018 [facility]. [place] [facility]                                 
                                                                                            
                    |
|  0. Other chest pain                                                                      
                                                                                            
                    |
|   1. Gastro-esophageal reflux disease without esophagitis                                 
                                                                                            
                    |
|   2. End stage renal disease                                                              
                                                                                            
                    |
|   3. 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
onic kidney disease, or end stage renal disease                                             
                    |
|   4. Chronic systolic (congestive) heart failure                                          
                                                                                            
                    |
|   5. 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
ctoris                                                                                      
                    |
|   6. Type 2 diabetes mellitus with diabetic chronic kidney disease                        
                                                                                            
                    |
|   7. Hyperlipidemia, unspecified                                                          
                                                                                            
                    |
|   8. Major depressive disorder, single episode, unspecified                               
                                                                                            
                    |
 
|   9. Chronic obstructive pulmonary disease, unspecified                                   
                                                                                            
                    |
|                                                                                           
                                                                                            
                    |
|Mar 6, 2018 [facility]. [place] [facility]                              
                                                                                            
                    |
|  Heart Failure                                                                            
                                                                                            
                    |
|   Need for iv access                                                                      
                                                                                            
                    |
|   Type 2 diabetes mellitus with other specified complication                              
                                                                                            
                    |
|   Long term (current) use of insulin                                                      
                                                                                            
                    |
|   Paroxysmal atrial fibrillation                                                          
                                                                                            
                    |
|   Anemia in chronic kidney disease                                                        
                                                                                            
                    |
|   Atherosclerotic heart disease of native coronary artery without angina pectoris         
                                                                                            
                    |
|   Cardiomyopathy, unspecified                                                             
                                                                                            
                    |
|   End stage renal disease                                                                 
                                                                                            
                    |
|   Other specified postprocedural states                                                   
                                                                                            
                    |
|                                                                                           
                                                                                            
                    |
|                                                                                           
                                                                                            
                    |
|                                                                                           
                                                                                            
                    |
|Care Providers                                                                             
                                                                                            
                    |
|Provider PRC Type Phone Fax Service Dates                                                  
                                                                                            
                    |
|HEADINGS, SANTIAGO, F.N.P. Nurse Practitioner: Family   Current                                
                                                                                            
                    |
|Marco Antonio Martinez /Care Coordinator [phone]  2019 - Current         
                                                                                            
                    |
 
|[facility] [phone]  2019 - Current                          
                                                                                            
                    |
|[facility]  [phone] Current                   
                                                                                            
                    |
|[facility] Primary [facility]   Current                                
                                                                                            
                    |
|[facility] GONZALEZ Primary Care   Current                                                        
                                                                                            
                    |
|[facility] Provider [phone] Current                 
                                                                                            
                    |
|[facility] Case or Care Manager   Current                                                   
                                                                                            
                    |
|MIRTA Goel Case or Care Manager [phone] Current
                                                                                            
                    |
|Jairo Gutierrez MD Other   Current                                                       
                                                                                            
                    |
|                                                                                           
                                                                                            
                    |
|[employer] [facility]                                                                          
                                                                                            
                    |
|This patient has registered at the [facility] Emergency Department     
                                                                                            
                    |
|For more information visit: [email or web address]
[email or web address]                                                                               
                    |
|The above information is provided for the sole purpose of patient treatment. Use of this in
formation beyond the terms of Data Sharing Memorandum of Understanding and License Agreement
 is prohibited. In  |
|certain cases not all visits may be represented. Consult the aforementioned facilities for 
additional information.                                                                     
                    |
|2019 [employer] - [place], [place] - [email or web address]                                                                                       
                    |
+-------------------------------------------------------------------------------------------
--------------------------------------------------------------------------------------------
--------------------+
 
 
 
+-------------------+---------+--------------------+--------------+
| Performing        | Address | City/State/Zipcode | Phone Number |
| Organization      |         |                    |              |
+-------------------+---------+--------------------+--------------+
|   ED INFORMATION  |         |                    |              |
| EXCHANGE          |         |                    |              |
+-------------------+---------+--------------------+--------------+
 Phosphorus (2019  5:38 AM)
 
 
+------------+--------------------------+-----------------+--------------+
| Component  | Value                    | Ref Range       | Performed At |
+------------+--------------------------+-----------------+--------------+
| PHOSPHORUS | 3.6Comment: Testing      | 2.3 - 4.8 mg/dL | [facility]   |
|            | performed at [facility], [address] |                 | LABORATORY   |
|            |  [address],        |                 |              |
|            | [place]  [id number]     |                 |              |
+------------+--------------------------+-----------------+--------------+
 
 
 
+----------+
| Specimen |
+----------+
| Blood    |
+----------+
 
 
 
+---------------+-------------------------+---------------------+----------------+
| Performing    | Address                 | City/State/Zipcode  | Phone Number   |
| Organization  |                         |                     |                |
+---------------+-------------------------+---------------------+----------------+
|   [facility]  |   [address] [place] [address]  | [place] [address] |   [phone] |
| LABORATORY    | [address]                   |                     |                |
+---------------+-------------------------+---------------------+----------------+
 Magnesium (2019  5:38 AM)
 
+-----------+--------------------------+-----------------+--------------+
| Component | Value                    | Ref Range       | Performed At |
+-----------+--------------------------+-----------------+--------------+
| MAGNESIUM | 2.3Comment: Testing      | 1.7 - 2.4 mg/dL | [facility]   |
|           | performed at [facility], [address] |                 | LABORATORY   |
|           |  [facility],        |                 |              |
|           | [place]  [address]     |                 |              |
+-----------+--------------------------+-----------------+--------------+
 
 
 
+----------+
| Specimen |
+----------+
| Blood    |
+----------+
 
 
 
+---------------+-------------------------+---------------------+----------------+
| Performing    | Address                 | City/State/Zipcode  | Phone Number   |
| Organization  |                         |                     |                |
+---------------+-------------------------+---------------------+----------------+
|   [facility]  |   [address] [place] [address]  | [place] [address] |   [phone] |
| LABORATORY    | [address]                   |                     |                |
+---------------+-------------------------+---------------------+----------------+
 Basic metabolic panel (2019  5:38 AM)Only the most recent of 2 results within the  period is included.
 
+----------------+--------------------------+-------------------+--------------+
| Component      | Value                    | Ref Range         | Performed At |
+----------------+--------------------------+-------------------+--------------+
 
| SODIUM         | 139                      | 135 - 145 mmol/L  | [facility]   |
|                |                          |                   | LABORATORY   |
+----------------+--------------------------+-------------------+--------------+
| POTASSIUM      | 3.9                      | 3.5 - 4.9 mmol/L  | [facility]   |
|                |                          |                   | LABORATORY   |
+----------------+--------------------------+-------------------+--------------+
| CHLORIDE       | 97 (L)                   | 99 - 109 mmol/L   | [facility]   |
|                |                          |                   | LABORATORY   |
+----------------+--------------------------+-------------------+--------------+
| CO2            | 31                       | 23 - 32 mmol/L    | [facility]   |
|                |                          |                   | LABORATORY   |
+----------------+--------------------------+-------------------+--------------+
| ANION GAP AGAP | 15                       | 5 - 20 mmol/L     | [facility]   |
|                |                          |                   | LABORATORY   |
+----------------+--------------------------+-------------------+--------------+
| GLUCOSE        | 169 (H)                  | 65 - 99 mg/dL     | [facility]   |
|                |                          |                   | LABORATORY   |
+----------------+--------------------------+-------------------+--------------+
| BUN            | 13                       | 8 - 25 mg/dL      | [facility]   |
|                |                          |                   | LABORATORY   |
+----------------+--------------------------+-------------------+--------------+
| CREATININE     | 4.8 (H)                  | 0.50 - 1.00 mg/dL | [facility]   |
|                |                          |                   | LABORATORY   |
+----------------+--------------------------+-------------------+--------------+
| BUN/CREAT      | 3                        |                   | [facility]   |
|                |                          |                   | LABORATORY   |
+----------------+--------------------------+-------------------+--------------+
| CALCIUM        | 9.4                      | 8.5 - 10.5 mg/dL  | [facility]   |
|                |                          |                   | LABORATORY   |
+----------------+--------------------------+-------------------+--------------+
| EGFR           | 9 (L)Comment: GFR <60:   | >60 mL/min/1.73m2 | [facility]   |
|                | CHRONIC KIDNEY DISEASE,  |                   | LABORATORY   |
|                | IF FOUND OVER A 3 MONTH  |                   |              |
|                | PERIOD.GFR <15: KIDNEY   |                   |              |
|                | FAILURE.FOR       |                   |              |
|                | AMERIC[employer], MULTIPLY THE  |                   |              |
|                | CALCULATED GFR BY        |                   |              |
|                | 1.210.This eGFR is       |                   |              |
|                | calculated using the     |                   |              |
|                | MDRD IDMS traceable      |                   |              |
|                | equation.Testing         |                   |              |
|                | performed at [facility], [address] |                   |              |
|                |  [facility],        |                   |              |
|                | [place]    [address]   |                   |              |
+----------------+--------------------------+-------------------+--------------+
 
 
 
+----------+
| Specimen |
+----------+
| Blood    |
+----------+
 
 
 
+---------------+-------------------------+---------------------+----------------+
| Performing    | Address                 | City/State/Zipcode  | Phone Number   |
| Organization  |                         |                     |                |
+---------------+-------------------------+---------------------+----------------+
 
|   [facility]  |   [address]  | [place] [address] |   [phone] |
| LABORATORY    | [address]                   |                     |                |
+---------------+-------------------------+---------------------+----------------+
 EKG STANDARD 12 LEAD (2019  6:18 AM)Only the most recent of 3 results within the time
 period is included.
 
+-------------------+--------------------------+-----------+--------------+
| Component         | Value                    | Ref Range | Performed At |
+-------------------+--------------------------+-----------+--------------+
| Ventricular Rate  | 73                       | BPM       | [facility]     |
+-------------------+--------------------------+-----------+--------------+
| Atrial Rate       | 73                       | BPM       | [facility]     |
+-------------------+--------------------------+-----------+--------------+
| P-R Interval      | 146                      | ms        | [facility]     |
+-------------------+--------------------------+-----------+--------------+
| QRS Duration      | 128                      | ms        | [facility]     |
+-------------------+--------------------------+-----------+--------------+
| Q-T Interval      | 464                      | ms        | [facility]     |
+-------------------+--------------------------+-----------+--------------+
| QTC Calculation   | 511                      | ms        | [facility]     |
| (Bezet)           |                          |           |              |
+-------------------+--------------------------+-----------+--------------+
| Calculated P Axis | 70                       | degrees   | [facility]     |
+-------------------+--------------------------+-----------+--------------+
| Calculated R Axis | -38                      | degrees   | [facility]     |
+-------------------+--------------------------+-----------+--------------+
| Calculated T Axis | 9                        | degrees   | [facility]     |
+-------------------+--------------------------+-----------+--------------+
| Diagnosis         | Normal sinus             |           | [facility]     |
|                   | rhythmPossible Left      |           |              |
|                   | atrial enlargementLeft   |           |              |
|                   | axis                     |           |              |
|                   | deviationNon-specific    |           |              |
|                   | intra-ventricular        |           |              |
|                   | conduction blockCannot   |           |              |
|                   | rule out Septal infarct  |           |              |
|                   | , age                    |           |              |
|                   | undeterminedAbnormal     |           |              |
|                   | ECGWhen compared with    |           |              |
|                   | ECG of 2019       |           |              |
|                   | 05:50,Minimal criteria   |           |              |
|                   | for Septal infarct are   |           |              |
|                   | now PresentConfirmed by  |           |              |
|                   | EN FRANK ([id number]) on   |           |              |
|                   | 2019 12:03:10 PM    |           |              |
+-------------------+--------------------------+-----------+--------------+
 
 
 
+--------------+-------------------+--------------------+--------------+
| Performing   | Address           | City/State/Zipcode | Phone Number |
| Organization |                   |                    |              |
+--------------+-------------------+--------------------+--------------+
|   [facility]   |   [address] | [place] [address] |              |
+--------------+-------------------+--------------------+--------------+
 IR stent femoral popliteal (2019  6:45 PM)
 
+------------------------------------------------------------------------+--------------+
| Impressions                                                            | Performed At |
+------------------------------------------------------------------------+--------------+
 
|      1. Aorta and iliac arteries were calcified but without            |   [facility]     |
| significant stenosis.  2. Right SFA with high-grade stenosis           | RADIOLOGY    |
| proximally with good endograft results after angioplasty and stenting. |              |
|   3. 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
| artery to right foot.  4. Right posterior tibial artery was            |              |
| chronically occluded.     Electronically signed by Kirt Mclaughlin MD on    |              |
| 2019 3:50 PM                                                      |              |
+------------------------------------------------------------------------+--------------+
 
 
 
+------------------------------------------------------------------------+--------------+
| Narrative                                                              | Performed At |
+------------------------------------------------------------------------+--------------+
|   LOWER EXTREMITY ARTERIOGRAM, UNILATERAL     PREOPERATIVE             |   [facility]     |
| DIAGNOSIS:    Critical limb ischemia of right lower extremity with     | RADIOLOGY    |
| poorly healing wound of right great toe     POSTOPERATIVE              |              |
| DIAGNOSIS:    Same     PROCEDURE:  1. Moderate conscious sedation  2.  |              |
| Ultrasound guided access of the left common femoral artery  3.         |              |
| Aortogram  4. Selective right common femoral artery catheterization    |              |
| with right leg runoff  5. Right SFA stenting using 6 x 80 mm           |              |
| self-expanding stent  6 Drug eluting balloon angioplasty of right SFA  |              |
| using 4 x 100 mm Lutonix balloon     SURGEON: Kirt Mclaughlin MD              |              |
| ASSISTANT: None     ANESTHESIA: Moderate sedation and local anesthesia |              |
|      Informed consent was obtained from the patient. Continuous        |              |
| cardiac monitoring was performed throughout the procedure.  Conscious  |              |
| sedation was provided by the nursing staff during the procedure under  |              |
| my supervision.  Sedation time: 34 minutes  Medications: 2 mg Versed   |              |
| IV, 50 mcg Fentanyl IV     ESTIMATED BLOOD LOSS: Minimal               |              |
| CONTRAST:    53 mL of Isovue 250     INDICATIONS:    See preoperative  |              |
| history and physical     FINDINGS:    Aorta and iliac arteries         |              |
| calcified but without significant stenosis. Right SFA with high-grade  |              |
| stenosis proximally. After angioplasty and stenting, good angiographic |              |
|  results. Good two-vessel runoff to right foot via right anterior      |              |
| tibial artery and peroneal artery. Right posterior tibial artery was   |              |
| chronically occluded.     DESCRIPTION OF PROCEDURE:    The patient was |              |
|  properly identified and brought to the Cath Lab. The patient was      |              |
| placed supine on the catheter table and patient was given moderate     |              |
| sedation by nursing staff under my supervision. Patient's bilateral    |              |
| groins were then prepped and draped in the usual sterile fashion.      |              |
| Local anesthetic was given to the left groin and the left common       |              |
| femoral artery was accessed under ultrasound guidance using a          |              |
| micropuncture needle. A micropuncture sheath was inserted over a wire  |              |
| and up sized to a 4 French sheath. A Omni flush catheter was then      |              |
| inserted into the distal aorta and aortogram was then performed with   |              |
| the findings as described above. The Omni Flush catheter was then used |              |
|  to guide a Glidewire into the right common femoral artery and then    |              |
| the catheter was then advanced over the wire. A right lower extremity  |              |
| runoff was then performed with the findings as described above.        |              |
| Next, an Amplatzer wire was then inserted over the catheter and the 4  |              |
| French sheath was removed. A 6 French destination sheath was then      |              |
| inserted over the wire with the tip of the sheath being placed into    |              |
| the right common femoral artery. A vertebral catheter was inserted     |              |
| over the wire and the wire was then removed. A Glidewire was then      |              |
| placed into the vertebral catheter and used to cross through the       |              |
| stenotic right SFA.    Next, a 260 fix core wire was then inserted     |              |
| over the catheter.    Right SFA was stented using 6 x 80 mm            |              |
| self-expanding stent. Drug eluting balloon angioplasty of the right    |              |
| SFA was then performed using 4 x 100 mm Lutonix balloon.     A final   |              |
| arteriogram was then performed through the sheath. The destination     |              |
 
| sheath was then removed and a Mynx closure device was then used on the |              |
|  left common femoral artery and hemostasis was ensured. The patient    |              |
| was then taken to the observation unit for further monitoring.         |              |
+------------------------------------------------------------------------+--------------+
 
 
 
+-------------------------------------------------------------------------------------------
--------------------------------------------------------------------------------------------
-----------------------------------+
| Procedure Note                                                                            
                                                                                            
                                   |
+-------------------------------------------------------------------------------------------
--------------------------------------------------------------------------------------------
-----------------------------------+
|   Denny, Rad Results In - 2019  8:40 AM PST  LOWER EXTREMITY ARTERIOGRAM,             
                                                                                            
                                   |
| UNILATERALPREOPERATIVE DIAGNOSIS:  Critical limb ischemia of right lower extremity with   
                                                                                            
                                   |
| poorly healing wound of right great toePOSTOPERATIVE DIAGNOSIS:  SamePROCEDURE:1.         
                                                                                            
                                   |
| Moderate conscious sedation2. Ultrasound guided access of the left common femoral         
                                                                                            
                                   |
| artery3. Aortogram4. Selective right common femoral artery catheterization with right     
                                                                                            
                                   |
| leg runoff5. Right SFA stenting using 6 x 80 mm self-expanding stent6 Drug eluting        
                                                                                            
                                   |
| balloon angioplasty of right SFA using 4 x 100 mm Lutonix balloonSURGEON: Kirt Mclaughlin,        
                                                                                            
                                   |
| MDASSISTANT: NoneANESTHESIA: Moderate sedation and local anesthesiaInformed consent was   
                                                                                            
                                   |
| obtained from the patient. Continuous cardiac monitoring was performed throughout the     
                                                                                            
                                   |
| procedure.Conscious sedation was provided by the nursing staff during the procedure       
                                                                                            
                                   |
| under my supervision.Sedation time: 34 minutesMedications: 2 mg Versed IV, 50 mcg         
                                                                                            
                                   |
| Fentanyl IVESTIMATED BLOOD LOSS: MinimalCONTRAST:  53 mL of Isovue 250INDICATIONS:  See   
                                                                                            
                                   |
| preoperative history and physicalFINDINGS:  Aorta and iliac arteries calcified but        
                                                                                            
                                   |
| without significant stenosis. Right SFA with high-grade stenosis proximally. After        
                                                                                            
                                   |
| angioplasty and stenting, good angiographic results. Good two-vessel runoff to right      
                                                                                            
 
                                   |
| foot via right anterior tibial artery and peroneal artery. Right posterior tibial artery  
                                                                                            
                                   |
|  was chronically occluded.DESCRIPTION OF PROCEDURE:  The patient was properly identified  
                                                                                            
                                   |
|  and brought to the Cath Lab. The patient was placed supine on the catheter table and     
                                                                                            
                                   |
| patient was given moderate sedation by nursing staff under my supervision. Patient's      
                                                                                            
                                   |
| bilateral groins were then prepped and draped in the usual sterile fashion. Local         
                                                                                            
                                   |
| anesthetic was given to the left groin and the left common femoral artery was accessed    
                                                                                            
                                   |
| under ultrasound guidance using a micropuncture needle. A micropuncture sheath was        
                                                                                            
                                   |
| inserted over a wire and up sized to a 4 French sheath. A Omni flush catheter was then    
                                                                                            
                                   |
| inserted into the distal aorta and aortogram was then performed with the findings as      
                                                                                            
                                   |
| described above. The Omni Flush catheter was then used to guide a Glidewire into the      
                                                                                            
                                   |
| right common femoral artery and then the catheter was then advanced over the wire. A      
                                                                                            
                                   |
| right lower extremity runoff was then performed with the findings as described            
                                                                                            
                                   |
| above.Next, an Amplatzer wire was then inserted over the catheter and the 4 French        
                                                                                            
                                   |
| sheath was removed. A 6 French destination sheath was then inserted over the wire with    
                                                                                            
                                   |
| the tip of the sheath being placed into the right common femoral artery. A vertebral      
                                                                                            
                                   |
| catheter was inserted over the wire and the wire was then removed. A Glidewire was then   
                                                                                            
                                   |
| placed into the vertebral catheter and used to cross through the stenotic right SFA.      
                                                                                            
                                   |
| Next, a 260 fix core wire was then inserted over the catheter.  Right SFA was stented     
                                                                                            
                                   |
| using 6 x 80 mm self-expanding stent. Drug eluting balloon angioplasty of the right SFA   
                                                                                            
                                   |
| was then performed using 4 x 100 mm Lutonix balloon.A final arteriogram was then          
                                                                                            
 
                                   |
| performed through the sheath. The destination sheath was then removed and a Mynx closure  
                                                                                            
                                   |
|  device was then used on the left common femoral artery and hemostasis was ensured. The   
                                                                                            
                                   |
| patient was then taken to the observation unit for further monitoring.IMPRESSION:1.       
                                                                                            
                                   |
| Aorta and iliac arteries were calcified but without significant stenosis.2. Right SFA     
                                                                                            
                                   |
| with high-grade stenosis proximally with good endograft results after angioplasty and     
                                                                                            
                                   |
| stenting.3. Two-vessel runoff via right anterior tibial artery and peroneal artery to     
                                                                                            
                                   |
| right foot.4. 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       
                                                                                            
                                   |
| signed by Kirt Mclaughlin MD on 2019 3:50 PM                                              
                                                                                            
                                   |
|                                                                                           
                                                                                            
                                   |
|A final arteriogram was then performed through the sheath. The destination sheath was then 
removed and a Mynx closure device was then used on the left common femoral artery and hemost
asis was ensured. The patient was  |
|then taken to the observation unit for further monitoring.                                 
                                                                                            
                                   |
|                                                                                           
                                                                                            
                                   |
|IMPRESSION:                                                                                
                                                                                            
                                  |
|                                                                                           
                                                                                            
                                   |
|1. Aorta and iliac arteries were calcified but without significant stenosis.               
                                                                                            
                                   |
|2. Right SFA with high-grade stenosis proximally with good endograft results after angiopla
sty and stenting.                                                                           
                                   |
|3. Two-vessel runoff via right anterior tibial artery and peroneal artery to right foot.   
                                                                                            
                                   |
|4. 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                                 
                                                                                            
                                   |
|                                                                                           
                                                                                            
                                   |
|Electronically signed by Kirt Mclaughlin MD on 2019 3:50 PM                                
                                                                                            
 
                                   |
+-------------------------------------------------------------------------------------------
--------------------------------------------------------------------------------------------
-----------------------------------+
 
 
 
+--------------------+------------------+--------------------+--------------+
| Performing         | Address          | City/State/Zipcode | Phone Number |
| Organization       |                  |                    |              |
+--------------------+------------------+--------------------+--------------+
|   [facility] |   [address] | [place] [address] |              |
+--------------------+------------------+--------------------+--------------+
 IR angiogram extremity right (2019  6:45 PM)
 
+------------------------------------------------------------------------+--------------+
| Impressions                                                            | Performed At |
+------------------------------------------------------------------------+--------------+
|      1. Aorta and iliac arteries were calcified but without            |   [facility]     |
| significant stenosis.  2. Right SFA with high-grade stenosis           | RADIOLOGY    |
| proximally with good endograft results after angioplasty and stenting. |              |
|   3. 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
| artery to right foot.  4. Right posterior tibial artery was            |              |
| chronically occluded.     Electronically signed by Kirt Mclaughlin MD on    |              |
| 2019 3:50 PM                                                      |              |
+------------------------------------------------------------------------+--------------+
 
 
 
+------------------------------------------------------------------------+--------------+
| Narrative                                                              | Performed At |
+------------------------------------------------------------------------+--------------+
|   LOWER EXTREMITY ARTERIOGRAM, UNILATERAL     PREOPERATIVE             |   [facility]     |
| DIAGNOSIS:    Critical limb ischemia of right lower extremity with     | RADIOLOGY    |
| poorly healing wound of right great toe     POSTOPERATIVE              |              |
| DIAGNOSIS:    Same     PROCEDURE:  1. Moderate conscious sedation  2.  |              |
| Ultrasound guided access of the left common femoral artery  3.         |              |
| Aortogram  4. Selective right common femoral artery catheterization    |              |
| with right leg runoff  5. Right SFA stenting using 6 x 80 mm           |              |
| self-expanding stent  6 Drug eluting balloon angioplasty of right SFA  |              |
| using 4 x 100 mm Lutonix balloon     SURGEON: Kirt Mclaughlin MD              |              |
| ASSISTANT: None     ANESTHESIA: Moderate sedation and local anesthesia |              |
|      Informed consent was obtained from the patient. Continuous        |              |
| cardiac monitoring was performed throughout the procedure.  Conscious  |              |
| sedation was provided by the nursing staff during the procedure under  |              |
| my supervision.  Sedation time: 34 minutes  Medications: 2 mg Versed   |              |
| IV, 50 mcg Fentanyl IV     ESTIMATED BLOOD LOSS: Minimal               |              |
| CONTRAST:    53 mL of Isovue 250     INDICATIONS:    See preoperative  |              |
| history and physical     FINDINGS:    Aorta and iliac arteries         |              |
| calcified but without significant stenosis. Right SFA with high-grade  |              |
| stenosis proximally. After angioplasty and stenting, good angiographic |              |
|  results. Good two-vessel runoff to right foot via right anterior      |              |
| tibial artery and peroneal artery. Right posterior tibial artery was   |              |
| chronically occluded.     DESCRIPTION OF PROCEDURE:    The patient was |              |
|  properly identified and brought to the Cath Lab. The patient was      |              |
| placed supine on the catheter table and patient was given moderate     |              |
| sedation by nursing staff under my supervision. Patient's bilateral    |              |
| groins were then prepped and draped in the usual sterile fashion.      |              |
| Local anesthetic was given to the left groin and the left common       |              |
| femoral artery was accessed under ultrasound guidance using a          |              |
 
| micropuncture needle. A micropuncture sheath was inserted over a wire  |              |
| and up sized to a 4 French sheath. A Omni flush catheter was then      |              |
| inserted into the distal aorta and aortogram was then performed with   |              |
| the findings as described above. The Omni Flush catheter was then used |              |
|  to guide a Glidewire into the right common femoral artery and then    |              |
| the catheter was then advanced over the wire. A right lower extremity  |              |
| runoff was then performed with the findings as described above.        |              |
| Next, an Amplatzer wire was then inserted over the catheter and the 4  |              |
| French sheath was removed. A 6 French destination sheath was then      |              |
| inserted over the wire with the tip of the sheath being placed into    |              |
| the right common femoral artery. A vertebral catheter was inserted     |              |
| over the wire and the wire was then removed. A Glidewire was then      |              |
| placed into the vertebral catheter and used to cross through the       |              |
| stenotic right SFA.    Next, a 260 fix core wire was then inserted     |              |
| over the catheter.    Right SFA was stented using 6 x 80 mm            |              |
| self-expanding stent. Drug eluting balloon angioplasty of the right    |              |
| SFA was then performed using 4 x 100 mm Lutonix balloon.     A final   |              |
| arteriogram was then performed through the sheath. The destination     |              |
| sheath was then removed and a Mynx closure device was then used on the |              |
|  left common femoral artery and hemostasis was ensured. The patient    |              |
| was then taken to the observation unit for further monitoring.         |              |
+------------------------------------------------------------------------+--------------+
 
 
 
+-------------------------------------------------------------------------------------------
--------------------------------------------------------------------------------------------
-----------------------------------+
| Procedure Note                                                                            
                                                                                            
                                   |
+-------------------------------------------------------------------------------------------
--------------------------------------------------------------------------------------------
-----------------------------------+
|   Denny, Rad Results In - 2019  8:40 AM PST  LOWER EXTREMITY ARTERIOGRAM,             
                                                                                            
                                   |
| UNILATERALPREOPERATIVE DIAGNOSIS:  Critical limb ischemia of right lower extremity with   
                                                                                            
                                   |
| poorly healing wound of right great toePOSTOPERATIVE DIAGNOSIS:  SamePROCEDURE:1.         
                                                                                            
                                   |
| Moderate conscious sedation2. Ultrasound guided access of the left common femoral         
                                                                                            
                                   |
| artery3. Aortogram4. Selective right common femoral artery catheterization with right     
                                                                                            
                                   |
| leg runoff5. Right SFA stenting using 6 x 80 mm self-expanding stent6 Drug eluting        
                                                                                            
                                   |
| balloon angioplasty of right SFA using 4 x 100 mm Lutonix balloonSURGEON: Kirt Mclaughlin,        
                                                                                            
                                   |
| MDASSISTANT: NoneANESTHESIA: Moderate sedation and local anesthesiaInformed consent was   
                                                                                            
                                   |
| obtained from the patient. Continuous cardiac monitoring was performed throughout the     
                                                                                            
 
                                   |
| procedure.Conscious sedation was provided by the nursing staff during the procedure       
                                                                                            
                                   |
| under my supervision.Sedation time: 34 minutesMedications: 2 mg Versed IV, 50 mcg         
                                                                                            
                                   |
| Fentanyl IVESTIMATED BLOOD LOSS: MinimalCONTRAST:  53 mL of Isovue 250INDICATIONS:  See   
                                                                                            
                                   |
| preoperative history and physicalFINDINGS:  Aorta and iliac arteries calcified but        
                                                                                            
                                   |
| without significant stenosis. Right SFA with high-grade stenosis proximally. After        
                                                                                            
                                   |
| angioplasty and stenting, good angiographic results. Good two-vessel runoff to right      
                                                                                            
                                   |
| foot via right anterior tibial artery and peroneal artery. Right posterior tibial artery  
                                                                                            
                                   |
|  was chronically occluded.DESCRIPTION OF PROCEDURE:  The patient was properly identified  
                                                                                            
                                   |
|  and brought to the Cath Lab. The patient was placed supine on the catheter table and     
                                                                                            
                                   |
| patient was given moderate sedation by nursing staff under my supervision. Patient's      
                                                                                            
                                   |
| bilateral groins were then prepped and draped in the usual sterile fashion. Local         
                                                                                            
                                   |
| anesthetic was given to the left groin and the left common femoral artery was accessed    
                                                                                            
                                   |
| under ultrasound guidance using a micropuncture needle. A micropuncture sheath was        
                                                                                            
                                   |
| inserted over a wire and up sized to a 4 French sheath. A Omni flush catheter was then    
                                                                                            
                                   |
| inserted into the distal aorta and aortogram was then performed with the findings as      
                                                                                            
                                   |
| described above. The Omni Flush catheter was then used to guide a Glidewire into the      
                                                                                            
                                   |
| right common femoral artery and then the catheter was then advanced over the wire. A      
                                                                                            
                                   |
| right lower extremity runoff was then performed with the findings as described            
                                                                                            
                                   |
| above.Next, an Amplatzer wire was then inserted over the catheter and the 4 French        
                                                                                            
                                   |
| sheath was removed. A 6 French destination sheath was then inserted over the wire with    
                                                                                            
 
                                   |
| the tip of the sheath being placed into the right common femoral artery. A vertebral      
                                                                                            
                                   |
| catheter was inserted over the wire and the wire was then removed. A Glidewire was then   
                                                                                            
                                   |
| placed into the vertebral catheter and used to cross through the stenotic right SFA.      
                                                                                            
                                   |
| Next, a 260 fix core wire was then inserted over the catheter.  Right SFA was stented     
                                                                                            
                                   |
| using 6 x 80 mm self-expanding stent. Drug eluting balloon angioplasty of the right SFA   
                                                                                            
                                   |
| was then performed using 4 x 100 mm Lutonix balloon.A final arteriogram was then          
                                                                                            
                                   |
| performed through the sheath. The destination sheath was then removed and a Mynx closure  
                                                                                            
                                   |
|  device was then used on the left common femoral artery and hemostasis was ensured. The   
                                                                                            
                                   |
| patient was then taken to the observation unit for further monitoring.IMPRESSION:1.       
                                                                                            
                                   |
| Aorta and iliac arteries were calcified but without significant stenosis.2. Right SFA     
                                                                                            
                                   |
| with high-grade stenosis proximally with good endograft results after angioplasty and     
                                                                                            
                                   |
| stenting.3. Two-vessel runoff via right anterior tibial artery and peroneal artery to     
                                                                                            
                                   |
| right foot.4. 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       
                                                                                            
                                   |
| signed by Kirt Mclaughlin MD on 2019 3:50 PM                                              
                                                                                            
                                   |
|                                                                                           
                                                                                            
                                   |
|A final arteriogram was then performed through the sheath. The destination sheath was then 
removed and a Mynx closure device was then used on the left common femoral artery and hemost
asis was ensured. The patient was  |
|then taken to the observation unit for further monitoring.                                 
                                                                                            
                                   |
|                                                                                           
                                                                                            
                                   |
|IMPRESSION:                                                                                
                                                                                            
                                  |
|                                                                                           
                                                                                            
 
                                   |
|1. Aorta and iliac arteries were calcified but without significant stenosis.               
                                                                                            
                                   |
|2. Right SFA with high-grade stenosis proximally with good endograft results after angiopla
sty and stenting.                                                                           
                                   |
|3. Two-vessel runoff via right anterior tibial artery and peroneal artery to right foot.   
                                                                                            
                                   |
|4. 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                                 
                                                                                            
                                   |
|                                                                                           
                                                                                            
                                   |
|Electronically signed by Kirt Mclaughlin MD on 2019 3:50 PM                                
                                                                                            
                                   |
+-------------------------------------------------------------------------------------------
--------------------------------------------------------------------------------------------
-----------------------------------+
 
 
 
+--------------------+------------------+--------------------+--------------+
| Performing         | Address          | City/State/Zipcode | Phone Number |
| Organization       |                  |                    |              |
+--------------------+------------------+--------------------+--------------+
|   [facility] |   [address] | [place] [address] |              |
+--------------------+------------------+--------------------+--------------+
 IR aortagram abdominal (2019  6:45 PM)
 
+------------------------------------------------------------------------+--------------+
| Impressions                                                            | Performed At |
+------------------------------------------------------------------------+--------------+
|      1. Aorta and iliac arteries were calcified but without            |   [facility]     |
| significant stenosis.  2. Right SFA with high-grade stenosis           | RADIOLOGY    |
| proximally with good endograft results after angioplasty and stenting. |              |
|   3. 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
| artery to right foot.  4. Right posterior tibial artery was            |              |
| chronically occluded.     Electronically signed by Kirt Mclaughlin MD on    |              |
| 2019 3:50 PM                                                      |              |
+------------------------------------------------------------------------+--------------+
 
 
 
+------------------------------------------------------------------------+--------------+
| Narrative                                                              | Performed At |
+------------------------------------------------------------------------+--------------+
|   LOWER EXTREMITY ARTERIOGRAM, UNILATERAL     PREOPERATIVE             |   [facility]     |
| DIAGNOSIS:    Critical limb ischemia of right lower extremity with     | RADIOLOGY    |
| poorly healing wound of right great toe     POSTOPERATIVE              |              |
| DIAGNOSIS:    Same     PROCEDURE:  1. Moderate conscious sedation  2.  |              |
| Ultrasound guided access of the left common femoral artery  3.         |              |
| Aortogram  4. Selective right common femoral artery catheterization    |              |
| with right leg runoff  5. Right SFA stenting using 6 x 80 mm           |              |
| self-expanding stent  6 Drug eluting balloon angioplasty of right SFA  |              |
| using 4 x 100 mm Lutonix balloon     SURGEON: Kirt Mclaughlin MD              |              |
| ASSISTANT: None     ANESTHESIA: Moderate sedation and local anesthesia |              |
 
|      Informed consent was obtained from the patient. Continuous        |              |
| cardiac monitoring was performed throughout the procedure.  Conscious  |              |
| sedation was provided by the nursing staff during the procedure under  |              |
| my supervision.  Sedation time: 34 minutes  Medications: 2 mg Versed   |              |
| IV, 50 mcg Fentanyl IV     ESTIMATED BLOOD LOSS: Minimal               |              |
| CONTRAST:    53 mL of Isovue 250     INDICATIONS:    See preoperative  |              |
| history and physical     FINDINGS:    Aorta and iliac arteries         |              |
| calcified but without significant stenosis. Right SFA with high-grade  |              |
| stenosis proximally. After angioplasty and stenting, good angiographic |              |
|  results. Good two-vessel runoff to right foot via right anterior      |              |
| tibial artery and peroneal artery. Right posterior tibial artery was   |              |
| chronically occluded.     DESCRIPTION OF PROCEDURE:    The patient was |              |
|  properly identified and brought to the Cath Lab. The patient was      |              |
| placed supine on the catheter table and patient was given moderate     |              |
| sedation by nursing staff under my supervision. Patient's bilateral    |              |
| groins were then prepped and draped in the usual sterile fashion.      |              |
| Local anesthetic was given to the left groin and the left common       |              |
| femoral artery was accessed under ultrasound guidance using a          |              |
| micropuncture needle. A micropuncture sheath was inserted over a wire  |              |
| and up sized to a 4 French sheath. A Omni flush catheter was then      |              |
| inserted into the distal aorta and aortogram was then performed with   |              |
| the findings as described above. The Omni Flush catheter was then used |              |
|  to guide a Glidewire into the right common femoral artery and then    |              |
| the catheter was then advanced over the wire. A right lower extremity  |              |
| runoff was then performed with the findings as described above.        |              |
| Next, an Amplatzer wire was then inserted over the catheter and the 4  |              |
| French sheath was removed. A 6 French destination sheath was then      |              |
| inserted over the wire with the tip of the sheath being placed into    |              |
| the right common femoral artery. A vertebral catheter was inserted     |              |
| over the wire and the wire was then removed. A Glidewire was then      |              |
| placed into the vertebral catheter and used to cross through the       |              |
| stenotic right SFA.    Next, a 260 fix core wire was then inserted     |              |
| over the catheter.    Right SFA was stented using 6 x 80 mm            |              |
| self-expanding stent. Drug eluting balloon angioplasty of the right    |              |
| SFA was then performed using 4 x 100 mm Lutonix balloon.     A final   |              |
| arteriogram was then performed through the sheath. The destination     |              |
| sheath was then removed and a Mynx closure device was then used on the |              |
|  left common femoral artery and hemostasis was ensured. The patient    |              |
| was then taken to the observation unit for further monitoring.         |              |
+------------------------------------------------------------------------+--------------+
 
 
 
+-------------------------------------------------------------------------------------------
--------------------------------------------------------------------------------------------
-----------------------------------+
| Procedure Note                                                                            
                                                                                            
                                   |
+-------------------------------------------------------------------------------------------
--------------------------------------------------------------------------------------------
-----------------------------------+
|   Lauro Bladwin Results In - 2019  8:40 AM PST  LOWER EXTREMITY ARTERIOGRAM,             
                                                                                            
                                   |
| UNILATERALPREOPERATIVE DIAGNOSIS:  Critical limb ischemia of right lower extremity with   
                                                                                            
                                   |
| poorly healing wound of right great toePOSTOPERATIVE DIAGNOSIS:  SamePROCEDURE:1.         
                                                                                            
 
                                   |
| Moderate conscious sedation2. Ultrasound guided access of the left common femoral         
                                                                                            
                                   |
| artery3. Aortogram4. Selective right common femoral artery catheterization with right     
                                                                                            
                                   |
| leg runoff5. Right SFA stenting using 6 x 80 mm self-expanding stent6 Drug eluting        
                                                                                            
                                   |
| balloon angioplasty of right SFA using 4 x 100 mm Lutonix balloonSURGEON: Kirt Mclaughlin,        
                                                                                            
                                   |
| MDASSISTANT: NoneANESTHESIA: Moderate sedation and local anesthesiaInformed consent was   
                                                                                            
                                   |
| obtained from the patient. Continuous cardiac monitoring was performed throughout the     
                                                                                            
                                   |
| procedure.Conscious sedation was provided by the nursing staff during the procedure       
                                                                                            
                                   |
| under my supervision.Sedation time: 34 minutesMedications: 2 mg Versed IV, 50 mcg         
                                                                                            
                                   |
| Fentanyl IVESTIMATED BLOOD LOSS: MinimalCONTRAST:  53 mL of Isovue 250INDICATIONS:  See   
                                                                                            
                                   |
| preoperative history and physicalFINDINGS:  Aorta and iliac arteries calcified but        
                                                                                            
                                   |
| without significant stenosis. Right SFA with high-grade stenosis proximally. After        
                                                                                            
                                   |
| angioplasty and stenting, good angiographic results. Good two-vessel runoff to right      
                                                                                            
                                   |
| foot via right anterior tibial artery and peroneal artery. Right posterior tibial artery  
                                                                                            
                                   |
|  was chronically occluded.DESCRIPTION OF PROCEDURE:  The patient was properly identified  
                                                                                            
                                   |
|  and brought to the Cath Lab. The patient was placed supine on the catheter table and     
                                                                                            
                                   |
| patient was given moderate sedation by nursing staff under my supervision. Patient's      
                                                                                            
                                   |
| bilateral groins were then prepped and draped in the usual sterile fashion. Local         
                                                                                            
                                   |
| anesthetic was given to the left groin and the left common femoral artery was accessed    
                                                                                            
                                   |
| under ultrasound guidance using a micropuncture needle. A micropuncture sheath was        
                                                                                            
                                   |
| inserted over a wire and up sized to a 4 French sheath. A Omni flush catheter was then    
                                                                                            
 
                                   |
| inserted into the distal aorta and aortogram was then performed with the findings as      
                                                                                            
                                   |
| described above. The Omni Flush catheter was then used to guide a Glidewire into the      
                                                                                            
                                   |
| right common femoral artery and then the catheter was then advanced over the wire. A      
                                                                                            
                                   |
| right lower extremity runoff was then performed with the findings as described            
                                                                                            
                                   |
| above.Next, an Amplatzer wire was then inserted over the catheter and the 4 French        
                                                                                            
                                   |
| sheath was removed. A 6 French destination sheath was then inserted over the wire with    
                                                                                            
                                   |
| the tip of the sheath being placed into the right common femoral artery. A vertebral      
                                                                                            
                                   |
| catheter was inserted over the wire and the wire was then removed. A Glidewire was then   
                                                                                            
                                   |
| placed into the vertebral catheter and used to cross through the stenotic right SFA.      
                                                                                            
                                   |
| Next, a 260 fix core wire was then inserted over the catheter.  Right SFA was stented     
                                                                                            
                                   |
| using 6 x 80 mm self-expanding stent. Drug eluting balloon angioplasty of the right SFA   
                                                                                            
                                   |
| was then performed using 4 x 100 mm Lutonix balloon.A final arteriogram was then          
                                                                                            
                                   |
| performed through the sheath. The destination sheath was then removed and a Mynx closure  
                                                                                            
                                   |
|  device was then used on the left common femoral artery and hemostasis was ensured. The   
                                                                                            
                                   |
| patient was then taken to the observation unit for further monitoring.IMPRESSION:1.       
                                                                                            
                                   |
| Aorta and iliac arteries were calcified but without significant stenosis.2. Right SFA     
                                                                                            
                                   |
| with high-grade stenosis proximally with good endograft results after angioplasty and     
                                                                                            
                                   |
| stenting.3. Two-vessel runoff via right anterior tibial artery and peroneal artery to     
                                                                                            
                                   |
| right foot.4. 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       
                                                                                            
                                   |
| signed by Kirt Mclaughlin MD on 2019 3:50 PM                                              
                                                                                            
 
                                   |
|                                                                                           
                                                                                            
                                   |
|A final arteriogram was then performed through the sheath. The destination sheath was then 
removed and a Mynx closure device was then used on the left common femoral artery and hemost
asis was ensured. The patient was  |
|then taken to the observation unit for further monitoring.                                 
                                                                                            
                                   |
|                                                                                           
                                                                                            
                                   |
|IMPRESSION:                                                                                
                                                                                            
                                  |
|                                                                                           
                                                                                            
                                   |
|1. Aorta and iliac arteries were calcified but without significant stenosis.               
                                                                                            
                                   |
|2. Right SFA with high-grade stenosis proximally with good endograft results after angiopla
sty and stenting.                                                                           
                                   |
|3. Two-vessel runoff via right anterior tibial artery and peroneal artery to right foot.   
                                                                                            
                                   |
|4. 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                                 
                                                                                            
                                   |
|                                                                                           
                                                                                            
                                   |
|Electronically signed by Kirt Mclaughlin MD on 2019 3:50 PM                                
                                                                                            
                                   |
+-------------------------------------------------------------------------------------------
--------------------------------------------------------------------------------------------
-----------------------------------+
 
 
 
+--------------------+------------------+--------------------+--------------+
| Performing         | Address          | City/State/Zipcode | Phone Number |
| Organization       |                  |                    |              |
+--------------------+------------------+--------------------+--------------+
|   [facility] |   [address] | [place] [address] |              |
+--------------------+------------------+--------------------+--------------+
 IR guidance vascular access US (2019  5:40 PM)
 
+------------------------------------------------------------------------+--------------+
| Narrative                                                              | Performed At |
+------------------------------------------------------------------------+--------------+
|   This Point of Care (POC) ultrasound image has been reviewed and      |   [facility]     |
| interpreted by the physician identified as the performing physician in | RADIOLOGY    |
|  the   associated interpretation and report.                           |              |
+------------------------------------------------------------------------+--------------+
 
 
 
 
+--------------------+------------------+--------------------+--------------+
| Performing         | Address          | City/State/Zipcode | Phone Number |
| Organization       |                  |                    |              |
+--------------------+------------------+--------------------+--------------+
|   [facility] |   [address] | [place] [address] |              |
+--------------------+------------------+--------------------+--------------+
 CBC w/manual diff (2019  3:43 PM)Only the most recent of 2 results within the time danny marquez is included.
 
+----------------------+-------------------------+-------------------+-----------------+
| Component            | Value                   | Ref Range         | Performed At    |
+----------------------+-------------------------+-------------------+-----------------+
| WBC                  | 5.53Comment:            | 3.80 - 11.00 K/uL | [facility] |
+----------------------+-------------------------+-------------------+-----------------+
| RBC                  | 2.76 (L)                | 3.70 - 5.10 M/uL  | [facility] |
+----------------------+-------------------------+-------------------+-----------------+
| HGB                  | 9.4 (L)                 | 11.3 - 15.5 g/dL  | [facility] |
+----------------------+-------------------------+-------------------+-----------------+
| HCT                  | 28.4 (L)                | 34.0 - 46.0 %     | [facility] |
+----------------------+-------------------------+-------------------+-----------------+
| MCV                  | 102.9 (H)               | 80.0 - 100.0 fl   | [facility] |
+----------------------+-------------------------+-------------------+-----------------+
| MCH                  | 34.1 (H)                | 27.0 - 34.0 pg    | [facility] |
+----------------------+-------------------------+-------------------+-----------------+
| MCHC                 | 33.2                    | 32.0 - 35.5 g/dL  | [facility] |
+----------------------+-------------------------+-------------------+-----------------+
| RDW SD               | 61.3 (H)                | 37 - 53 fl        | [facility] |
+----------------------+-------------------------+-------------------+-----------------+
| PLT                  | 147 (L)Comment:         | 150 - 400 K/uL    | [facility] |
+----------------------+-------------------------+-------------------+-----------------+
| MPV                  | 9.3Comment:             | fl                | [facility] |
+----------------------+-------------------------+-------------------+-----------------+
| DIFF TYPE            | MANUAL                  |                   | [facility] |
+----------------------+-------------------------+-------------------+-----------------+
| Neutrophils Manual   | 64                      | %                 | [facility] |
+----------------------+-------------------------+-------------------+-----------------+
| Lymphocytes Manual   | 28                      | %                 | [facility] |
+----------------------+-------------------------+-------------------+-----------------+
| Monocytes Manual     | 8                       | %                 | [facility] |
+----------------------+-------------------------+-------------------+-----------------+
| Neutrophils Absolute | 3.54                    | 1.90 - 7.40 K/uL  | [facility] |
+----------------------+-------------------------+-------------------+-----------------+
| Lymphocytes Absolute | 1.55                    | 1.00 - 3.90 K/uL  | [facility] |
+----------------------+-------------------------+-------------------+-----------------+
| Monocytes Absolute   | 0.44                    | 0.00 - 0.80 K/uL  | [facility] |
+----------------------+-------------------------+-------------------+-----------------+
| MORPHOLOGY           | 1+Comment: ANISONORMAL  |                   | [facility] |
|                      | PLT MORPHTesting        |                   |                 |
|                      | performed at [facility];[address]    |                   |                 |
|                      | [address];[place]  |                   |                 |
|                      | [id number]                   |                   |                 |
|                      |                         |                   |                 |
+----------------------+-------------------------+-------------------+-----------------+
 
 
 
+-------------------+
| Specimen          |
+-------------------+
 
| Blood - Arm, Left |
+-------------------+
 
 
 
+-------------------+------------------+--------------------+--------------+
| Performing        | Address          | City/State/Zipcode | Phone Number |
| Organization      |                  |                    |              |
+-------------------+------------------+--------------------+--------------+
|   [facility] |   [address] | [place] [address] |              |
+-------------------+------------------+--------------------+--------------+
 CPK (2019  3:43 PM)
 
+-----------+-------------------------+--------------+-----------------+
| Component | Value                   | Ref Range    | Performed At    |
+-----------+-------------------------+--------------+-----------------+
| CPK       | 28 (L)Comment: Testing  | 30 - 240 U/L | [facility] |
|           | performed at [facility];[address]    |              |                 |
|           | [address];[place], [place]  |              |                 |
|           | [id number]                   |              |                 |
+-----------+-------------------------+--------------+-----------------+
 
 
 
+-------------------+
| Specimen          |
+-------------------+
| Blood - Arm, Left |
+-------------------+
 
 
 
+-------------------+------------------+--------------------+--------------+
| Performing        | Address          | City/State/Zipcode | Phone Number |
| Organization      |                  |                    |              |
+-------------------+------------------+--------------------+--------------+
|   [facility] |   [address] | [place] [address] |              |
+-------------------+------------------+--------------------+--------------+
 US lower extremty  arterial right (2019  2:23 PM)
 
+------------------------------------------------------------------------+--------------+
| Impressions                                                            | Performed At |
+------------------------------------------------------------------------+--------------+
|   1. Right leg shows biphasic inflow with a greater than 50% stenosis  |   [facility]     |
| at  the origin of the superficial femoral artery                       | RADIOLOGY    |
| ([phone]).    Biphasic  outflow.  2. Two vessel runoff to the right    |              |
| foot.  Signed by: Eugenio Hoffman  Sign Date/Time: 2019 3:11 PM  |              |
+------------------------------------------------------------------------+--------------+
 
 
 
+------------------------------------------------------------------------+--------------+
| Narrative                                                              | Performed At |
+------------------------------------------------------------------------+--------------+
|   LOWER EXTREMITY ARTERIAL EXAM WITH IMAGING  CLINICAL INFORMATION:    |   [facility]     |
| The patient is a 68-year-old female presenting to the vascular lab     | RADIOLOGY    |
| with  complaints of right foot nonhealing wound.    We have been asked |              |
|  to  evaluate for peripheral arterial disease.  COMPARISON:  None      |              |
| PROCEDURE:  Imaging of the right lower extremity arteries was          |              |
| performed using both  color Doppler and spectral Doppler techniques    |              |
 
| with a 9 megahertz linear  array transducer.  FINDINGS:  RIGHT  CFA    |              |
| prox 137 biphasic  DFA prox 71 biphasic  SFA prox 309 biphasic  SFA    |              |
| mid 91 biphasic  SFA distal 127 biphasic  POP mid 96 biphasic  GIL     |              |
| prox 69 biphasic  GIL distal 145 biphasic  PTA prox 50 biphasic  PTA   |              |
| distal 58 biphasic  WINIFRED prox 74 biphasic  WINIFRED distal 0    absent     |              |
+------------------------------------------------------------------------+--------------+
 
 
 
+------------------------------------------------------------------------------------------+
| Procedure Note                                                                           |
+------------------------------------------------------------------------------------------+
|   Denny, Rad Results In - 2019  3:14 PM PST  LOWER EXTREMITY ARTERIAL EXAM WITH      |
| IMAGINGCLINICAL INFORMATION:The patient is a 68-year-old female presenting to the        |
| vascular lab withcomplaints of right foot nonhealing wound.  We have been asked          |
| toevaluate for peripheral arterial disease.COMPARISON:NonePROCEDURE:Imaging of the right |
|  lower extremity arteries was performed using bothcolor Doppler and spectral Doppler     |
| techniques with a 9 megahertz lineararray transducer.FINDINGS:RIGHTCFA prox 137          |
| biphasicDFA prox 71 biphasicSFA prox 309 biphasicSFA mid 91 biphasicSFA distal 127       |
| biphasicPOP mid 96 biphasicATA prox 69 biphasicATA distal 145 biphasicPTA prox 50        |
| biphasicPTA distal 58 biphasicPERA prox 74 biphasicPERA distal 0  absentIMPRESSION:1.    |
| Right leg shows biphasic inflow with a greater than 50% stenosis atthe origin of the     |
| superficial femoral artery (137-309).  Biphasicoutflow.2. Two vessel runoff to the right |
|  foot.Signed by: Jamilah Hoffman Date/Time: 2019 3:11 PM                       |
|RIGHT                                                                                    |
|CFA prox 137 biphasic                                                                     |
|DFA prox 71 biphasic                                                                      |
|SFA prox 309 biphasic                                                                     |
|SFA mid 91 biphasic                                                                       |
|SFA distal 127 biphasic                                                                   |
|POP mid 96 biphasic                                                                       |
|GIL prox 69 biphasic                                                                      |
|GIL distal 145 biphasic                                                                   |
|PTA prox 50 biphasic                                                                      |
|PTA distal 58 biphasic                                                                    |
|WINIFRED prox 74 biphasic                                                                     |
|WINIFRED distal 0  absent                                                                     |
|IMPRESSION:                                                                              |
|1. Right leg shows biphasic inflow with a greater than 50% stenosis at                    |
|the origin of the superficial femoral artery (137-309).  Biphasic                         |
|outflow.                                                                                 |
|2. Two vessel runoff to the right foot.                                                   |
|Signed by: Eugenio Hoffman                                                                |
|Sign Date/Time: 2019 3:11 PM                                                        |
+------------------------------------------------------------------------------------------+
 
 
 
+--------------------+------------------+--------------------+--------------+
| Performing         | Address          | City/State/Zipcode | Phone Number |
| Organization       |                  |                    |              |
+--------------------+------------------+--------------------+--------------+
|   [facility] |   [address] | [place] [address] |              |
+--------------------+------------------+--------------------+--------------+
 Renal function panel (2019  8:47 AM)
 
+----------------+--------------------------+-------------------+-----------------+
| Component      | Value                    | Ref Range         | Performed At    |
+----------------+--------------------------+-------------------+-----------------+
| SODIUM         | 139                      | 135 - 145 mmol/L  | [facility] |
 
+----------------+--------------------------+-------------------+-----------------+
| POTASSIUM      | 4.4                      | 3.5 - 4.9 mmol/L  | [facility] |
+----------------+--------------------------+-------------------+-----------------+
| CHLORIDE       | 99                       | 99 - 109 mmol/L   | [facility] |
+----------------+--------------------------+-------------------+-----------------+
| CO2            | 32                       | 23 - 32 mmol/L    | [facility] |
+----------------+--------------------------+-------------------+-----------------+
| ANION GAP AGAP | 12                       | 5 - 20 mmol/L     | [facility] |
+----------------+--------------------------+-------------------+-----------------+
| GLUCOSE        | 197 (H)                  | 65 - 99 mg/dL     | [facility] |
+----------------+--------------------------+-------------------+-----------------+
| BUN            | 24                       | 8 - 25 mg/dL      | [facility] |
+----------------+--------------------------+-------------------+-----------------+
| CREATININE     | 6.5 (H)                  | 0.50 - 1.00 mg/dL | [facility] |
+----------------+--------------------------+-------------------+-----------------+
| CALCIUM        | 8.7                      | 8.5 - 10.5 mg/dL  | [facility] |
+----------------+--------------------------+-------------------+-----------------+
| Albumin        | 2.2 (L)                  | 3.3 - 4.8 g/dL    | [facility] |
+----------------+--------------------------+-------------------+-----------------+
| PHOSPHORUS     | 4.4                      | 2.3 - 4.8 mg/dL   | [facility] |
+----------------+--------------------------+-------------------+-----------------+
| EGFR           | 6 (L)Comment: GFR <60:   | >60 mL/min/1.73m2 | [facility] |
|                | CHRONIC KIDNEY DISEASE,  |                   |                 |
|                | IF FOUND OVER A 3 MONTH  |                   |                 |
|                | PERIOD.GFR <15: KIDNEY   |                   |                 |
|                | FAILURE.FOR       |                   |                 |
|                | [employer]IC[employer], MULTIPLY THE  |                   |                 |
|                | CALCULATED GFR BY        |                   |                 |
|                | 1.210.This eGFR is       |                   |                 |
|                | calculated using the     |                   |                 |
|                | MDRD IDMS traceable      |                   |                 |
|                | equation.Testing         |                   |                 |
|                | performed at [facility];[address]     |                   |                 |
|                | [facility];[place], [place]   |                   |                 |
|                | [id number]                    |                   |                 |
+----------------+--------------------------+-------------------+-----------------+
 
 
 
+----------+
| Specimen |
+----------+
| Blood    |
+----------+
 
 
 
+-------------------+------------------+--------------------+--------------+
| Performing        | Address          | City/State/Zipcode | Phone Number |
| Organization      |                  |                    |              |
+-------------------+------------------+--------------------+--------------+
|   [facility] |   [address] | [place] [address] |              |
+-------------------+------------------+--------------------+--------------+
 Troponin I (2019  5:47 PM)Only the most recent of 2 results within the time period is
 included.
 
+------------+--------------------------+-------------------+-----------------+
| Component  | Value                    | Ref Range         | Performed At    |
+------------+--------------------------+-------------------+-----------------+
| TROPONIN I | 0.061Comment:   0.00 to  | 0.00 - 0.10 ng/mL | [facility] |
 
|            | 0.10     CONSISTENT WITH |                   |                 |
|            |  NORMAL POPULATION0.11   |                   |                 |
|            | to 0.60     CONSISTENT   |                   |                 |
|            | WITH INCREASED RISK FOR  |                   |                 |
|            | ADVERSE OUTCOMES>        |                   |                 |
|            | 0.60                     |                   |                 |
|            | CONSISTENT WITH WHO      |                   |                 |
|            | CRITERIA FOR ACUTE MI    |                   |                 |
|            | Testing performed at     |                   |                 |
|            | [facility];[address]            |                   |                 |
|            [address];[place] [address]   |                   |                 |
+------------+--------------------------+-------------------+-----------------+
 
 
 
+----------+
| Specimen |
+----------+
| Blood    |
+----------+
 
 
 
+-------------------+------------------+--------------------+--------------+
| Performing        | Address          | City/State/Zipcode | Phone Number |
| Organization      |                  |                    |              |
+-------------------+------------------+--------------------+--------------+
|   [facility] |   [address] | [place] [address] |              |
+-------------------+------------------+--------------------+--------------+
 ISTAT Troponin (2019 10:26 AM)
 
+----------------+-------+-----------+--------------+
| Component      | Value | Ref Range | Performed At |
+----------------+-------+-----------+--------------+
| POC Troponin I | 0.04  |           |              |
+----------------+-------+-----------+--------------+
 POC cardiac troponin (2019  9:54 AM)
 
+----------------------+--------------------------+-------------------+-----------------+
| Component            | Value                    | Ref Range         | Performed At    |
+----------------------+--------------------------+-------------------+-----------------+
| POC CARDIAC TROPONIN | 0.04Comment:   0.00 to   | 0.00 - 0.10 ng/mL | [facility] |
|                      | 0.10    Consistent with  |                   |                 |
|                      | normal population0.11 to |                   |                 |
|                      |  0.40    Consistent with |                   |                 |
|                      |  increased risk for      |                   |                 |
|                      | adverse                  |                   |                 |
|                      | outcomes>0.40            |                   |                 |
|                      |       Consistent with    |                   |                 |
|                      | WHO criteria for Acute   |                   |                 |
|                      | MI Testing performed at  |                   |                 |
|                      | [facility];[address]            |                   |                 |
|                      | [address];[place] [address]   |                   |                 |
+----------------------+--------------------------+-------------------+-----------------+
 
 
 
+-------------------+------------------+--------------------+--------------+
| Performing        | Address          | City/State/Zipcode | Phone Number |
| Organization      |                  |                    |              |
 
+-------------------+------------------+--------------------+--------------+
|   [facility] |   [address] | [place] [address] |              |
+-------------------+------------------+--------------------+--------------+
 EKG 12 LEAD UNIT PERFORMED (2019  9:44 AM)
 
+-------------------+--------------------------+-----------+--------------+
| Component         | Value                    | Ref Range | Performed At |
+-------------------+--------------------------+-----------+--------------+
| Ventricular Rate  | 93                       | BPM       | [facility]     |
+-------------------+--------------------------+-----------+--------------+
| Atrial Rate       | 93                       | BPM       | [facility]     |
+-------------------+--------------------------+-----------+--------------+
| P-R Interval      | 136                      | ms        | [facility]     |
+-------------------+--------------------------+-----------+--------------+
| QRS Duration      | 126                      | ms        | [facility]     |
+-------------------+--------------------------+-----------+--------------+
| Q-T Interval      | 416                      | ms        | [facility]     |
+-------------------+--------------------------+-----------+--------------+
| QTC Calculation   | 517                      | ms        | [facility]     |
| (Vanessa)           |                          |           |              |
+-------------------+--------------------------+-----------+--------------+
| Calculated P Axis | 84                       | degrees   | [facility]     |
+-------------------+--------------------------+-----------+--------------+
| Calculated R Axis | -30                      | degrees   | [facility]     |
+-------------------+--------------------------+-----------+--------------+
| Calculated T Axis | 37                       | degrees   | [facility]     |
+-------------------+--------------------------+-----------+--------------+
| Diagnosis         | Normal sinus rhythmLeft  |           | [facility]     |
|                   | axis                     |           |              |
|                   | deviationNon-specific    |           |              |
|                   | intra-ventricular        |           |              |
|                   | conduction blockCannot   |           |              |
|                   | rule out Septal infarct  |           |              |
|                   | , age                    |           |              |
|                   | undeterminedAbnormal     |           |              |
|                   | ECGWhen compared with    |           |              |
|                   | ECG of 2019       |           |              |
|                   | 16:30,QRS duration has   |           |              |
|                   | decreasedMinimal         |           |              |
|                   | criteria for Septal      |           |              |
|                   | infarct are now PresentT |           |              |
|                   |  wave inversion now      |           |              |
|                   | evident in Lateral       |           |              |
|                   | leadsThis ECG contains   |           |              |
|                   | Unconfirmed              |           |              |
|                   | Interpretation           |           |              |
|                   | Statements.    See ED    |           |              |
|                   | Record for Physician     |           |              |
|                   | Interpretation.          |           |              |
|                   | Confirmed by MUSE READ   |           |              |
|                   | ONLY, -COMPUTER ([id number]),   |           |              |
|                   |  Sherman Grissom   |           |              |
|                   | Samm ([id number]) on 2019  |           |              |
|                   | 3:51:40 AM               |           |              |
+-------------------+--------------------------+-----------+--------------+
 
 
 
+--------------+-------------------+--------------------+--------------+
| Performing   | Address           | City/State/Zipcode | Phone Number |
 
| Organization |                   |                    |              |
+--------------+-------------------+--------------------+--------------+
|   [facility]   |   [address] | [place] [address] |              |
+--------------+-------------------+--------------------+--------------+
 from Last 3 Months
 
 Insurance
 
 
+----------+--------+-------------+------+-------+----------------------+
| Payer    | Benefi | Subscriber  | Type | Phone | Address              |
|          | t Plan | ID          |      |       |                      |
|          |  /     |             |      |       |                      |
|          | Group  |             |      |       |                      |
+----------+--------+-------------+------+-------+----------------------+
| MEDICARE | MEDICA | [id number] |      |       |   [address]        |
|          | RE     |             |      |       | [place] [id number] |
|          | IP-OP  |             |      |       |                      |
+----------+--------+-------------+------+-------+----------------------+
| MEDICAID | MEDICA | [id number]    |      |       |   [address]        |
|          | ID     |             |      |       | [place], [place]          |
|          | [place] |             |      |       | [id number]           |
+----------+--------+-------------+------+-------+----------------------+
 
 
 
+----------------------+--------+-------------+--------+-------------+-----------------+
| Guarantor Name       | [employer] | Relation to | Date   | Phone       | Billing Address |
|                      | t Type |  Patient    | of     |             |                 |
|                      |        |             | Birth  |             |                 |
+----------------------+--------+-------------+--------+-------------+-----------------+
| CHERIE JO | Person | Self        | / |   Home:     |   [address]    |
|                      | al/Fam |             | 1950   | [phone] | [place]    |
|                      | [place]    |             |        | [phone]        | [phone]      |
+----------------------+--------+-------------+--------+-------------+-----------------+

## 2019-04-03 NOTE — XMS
Encounter Summary
  Created on: 2019
 
 Cherie Villalobos
 External Reference #: [id number]
 : 48
 Sex: Female
 
 Demographics
 
 
+-----------------------+--------------------------+
| Address               | [address]               |
|                       | [place]  [address] |
+-----------------------+--------------------------+
| Home Phone            | [phone]          |
+-----------------------+--------------------------+
| Preferred Language    | Unknown                  |
+-----------------------+--------------------------+
| Marital Status        |                   |
+-----------------------+--------------------------+
| Moravian Affiliation | 1013                     |
+-----------------------+--------------------------+
| Race                  | Unknown                  |
+-----------------------+--------------------------+
| Ethnic Group          | Unknown                  |
+-----------------------+--------------------------+
 
 
 Author
 
 
+--------------+-----------------------+
| Author       | Sherry Quinyx AB |
+--------------+-----------------------+
| Organization | [facility] |
+--------------+-----------------------+
| Address      | Unknown               |
+--------------+-----------------------+
| Phone        | Unavailable           |
+--------------+-----------------------+
 
 
 
 Support
 
 
+---------------+--------------+---------------------+-----------------+
| Name          | Relationship | Address             | Phone           |
+---------------+--------------+---------------------+-----------------+
| Anoop Villalobos | ECON         | [place], | [phone] |
|               |              |  [place]  [address]     |                 |
+---------------+--------------+---------------------+-----------------+
| Jennifer Dickson | ECON         | [place]       | [phone] |
|               |              | [phone]               |                 |
+---------------+--------------+---------------------+-----------------+
 
 
 
 
 Care Team Providers
 
 
+-----------------------+------+-----------------+
| Care Team Member Name | Role | Phone           |
+-----------------------+------+-----------------+
| Ivy Couch DO      | PCP  | [phone] |
+-----------------------+------+-----------------+
 
 
 
 Encounter Details
 
 
+--------+------------+----------------------+-----------+-------------+
| Date   | Type       | Department           | Care Team | Description |
+--------+------------+----------------------+-----------+-------------+
| / | Procedure  |   [facility]    |           |             |
| 2019   | Pass       | [facility]   |           |             |
|        |            | [address] [place] [address] |           |             |
|        |            |   [address]     |           |             |
|        |            | [place] [address]   |           |             |
|        |            | [phone]         |           |             |
+--------+------------+----------------------+-----------+-------------+
 
 
 
 Social History
 
 
+---------------+------------+-----------+--------+------------------+
| Tobacco Use   | Types      | Packs/Day | Years  | Date             |
|               |            |           | Used   |                  |
+---------------+------------+-----------+--------+------------------+
| Former Smoker | Cigarettes | 1         | 30     | Quit: 2004 |
+---------------+------------+-----------+--------+------------------+
 
 
 
+---------------------+---+---+---+
| Smokeless Tobacco:  |   |   |   |
| Never Used          |   |   |   |
+---------------------+---+---+---+
 
 
 
+------------------------------+
| Comments: quite smoking  |
+------------------------------+
 
 
 
+-------------+-----------+---------+----------+
| Alcohol Use | Drinks/We | oz/Week | Comments |
|             | ek        |         |          |
+-------------+-----------+---------+----------+
| No          |           |         |          |
+-------------+-----------+---------+----------+
 
 
 
 
+------------------+---------------+
| Sex Assigned at  | Date Recorded |
| Birth            |               |
+------------------+---------------+
| Not on file      |               |
+------------------+---------------+
 as of this encounter
 
 Plan of Treatment
 
 
+--------+---------+-----------+----------------------+-------------+
| Date   | Type    | Specialty | Care Team            | Description |
+--------+---------+-----------+----------------------+-------------+
| 04/10/ | Office  | Podiatry  |   Srinivasan Jordan,  |             |
| 2019   | Visit   |           | DPM  [address], |             |
|        |         |           |  [place],  |             |
|        |         |           | [place] [address]             |             |
|        |         |           | [phone]         |             |
|        |         |           | [phone] (Fax)   |             |
+--------+---------+-----------+----------------------+-------------+
 as of this encounter
 
 Visit Diagnoses
 Not on filein this encounter

## 2019-04-03 NOTE — XMS
Encounter Summary
  Created on: 2019
 
 Cherie Villalobos
 External Reference #: [id number]
 : 48
 Sex: Female
 
 Demographics
 
 
+-----------------------+--------------------------+
| Address               | [address]               |
|                       | [place]  [address] |
+-----------------------+--------------------------+
| Home Phone            | [phone]          |
+-----------------------+--------------------------+
| Preferred Language    | Unknown                  |
+-----------------------+--------------------------+
| Marital Status        |                   |
+-----------------------+--------------------------+
| Jew Affiliation | 1013                     |
+-----------------------+--------------------------+
| Race                  | Unknown                  |
+-----------------------+--------------------------+
| Ethnic Group          | Unknown                  |
+-----------------------+--------------------------+
 
 
 Author
 
 
+--------------+-----------------------+
| Author       | Sherry SpeedTax |
+--------------+-----------------------+
| Organization | [facility] |
+--------------+-----------------------+
| Address      | Unknown               |
+--------------+-----------------------+
| Phone        | Unavailable           |
+--------------+-----------------------+
 
 
 
 Support
 
 
+---------------+--------------+---------------------+-----------------+
| Name          | Relationship | Address             | Phone           |
+---------------+--------------+---------------------+-----------------+
| Anoop Villalobos | ECON         | [place], | [phone] |
|               |              |  [place]  [address]     |                 |
+---------------+--------------+---------------------+-----------------+
| Jennifer Dickson | ECON         | [place]       | [phone] |
|               |              | [phone]               |                 |
+---------------+--------------+---------------------+-----------------+
 
 
 
 
 Care Team Providers
 
 
+-----------------------+------+-----------------+
| Care Team Member Name | Role | Phone           |
+-----------------------+------+-----------------+
| Ivy Couch DO      | PCP  | [phone] |
+-----------------------+------+-----------------+
 
 
 
 Reason for Visit
 
 
+-----------+----------------------+
| Reason    | Comments             |
+-----------+----------------------+
| Follow-up | schedule appointment |
+-----------+----------------------+
 
 
 
 Encounter Details
 
 
+--------+-----------+----------------------+----------------------+----------------------+
| Date   | Type      | Department           | Care Team            | Description          |
+--------+-----------+----------------------+----------------------+----------------------+
| / | Telephone |   [facility] |   Srinivasan Jordan,  | Follow-up (schedule  |
| 2019   |           |  [address] Ankle  [address]      | DPM  [address], | appointment)         |
|        |           | [address]   |  [address]  [place],  |                      |
|        |           | [place], [place]         | [place] [address]             |                      |
|        |           | [address]           | [phone]         |                      |
|        |           | [phone]         | [phone] (Fax)   |                      |
+--------+-----------+----------------------+----------------------+----------------------+
 
 
 
 Social History
 
 
+---------------+------------+-----------+--------+------------------+
| Tobacco Use   | Types      | Packs/Day | Years  | Date             |
|               |            |           | Used   |                  |
+---------------+------------+-----------+--------+------------------+
| Former Smoker | Cigarettes | 1         | 30     | Quit: 2004 |
+---------------+------------+-----------+--------+------------------+
 
 
 
+---------------------+---+---+---+
| Smokeless Tobacco:  |   |   |   |
| Never Used          |   |   |   |
+---------------------+---+---+---+
 
 
 
+------------------------------+
| Comments: quite smoking  |
 
+------------------------------+
 
 
 
+-------------+-----------+---------+----------+
| Alcohol Use | Drinks/We | oz/Week | Comments |
|             | ek        |         |          |
+-------------+-----------+---------+----------+
| No          |           |         |          |
+-------------+-----------+---------+----------+
 
 
 
+------------------+---------------+
| Sex Assigned at  | Date Recorded |
| Birth            |               |
+------------------+---------------+
| Not on file      |               |
+------------------+---------------+
 as of this encounter
 
 Plan of Treatment
 
 
+--------+---------+-----------+----------------------+-------------+
| Date   | Type    | Specialty | Care Team            | Description |
+--------+---------+-----------+----------------------+-------------+
| 04/10/ | Office  | Podiatry  |   Srinivasan Jordan,  |             |
| 2019   | Visit   |           | DPCLARE  [address], |             |
|        |         |           |  [address]  [place],  |             |
|        |         |           | [place] [address]             |             |
|        |         |           | [phone]         |             |
|        |         |           | [phone] (Fax)   |             |
+--------+---------+-----------+----------------------+-------------+
 as of this encounter
 
 Visit Diagnoses
 Not on filein this encounter

## 2019-04-03 NOTE — XMS
Encounter Summary
  Created on: 2019
 
 Cherie Villalobos
 External Reference #: [id number]
 : 48
 Sex: Female
 
 Demographics
 
 
+-----------------------+--------------------------+
| Address               | [address]               |
|                       | [place]  [address] |
+-----------------------+--------------------------+
| Home Phone            | [phone]          |
+-----------------------+--------------------------+
| Preferred Language    | Unknown                  |
+-----------------------+--------------------------+
| Marital Status        |                   |
+-----------------------+--------------------------+
| Latter-day Affiliation | [id number]                     |
+-----------------------+--------------------------+
| Race                  | Unknown                  |
+-----------------------+--------------------------+
| Ethnic Group          | Unknown                  |
+-----------------------+--------------------------+
 
 
 Author
 
 
+--------------+-----------------------+
| Author       | Sherry Siemens |
+--------------+-----------------------+
| Organization | [facility] |
+--------------+-----------------------+
| Address      | Unknown               |
+--------------+-----------------------+
| Phone        | Unavailable           |
+--------------+-----------------------+
 
 
 
 Support
 
 
+---------------+--------------+---------------------+-----------------+
| Name          | Relationship | Address             | Phone           |
+---------------+--------------+---------------------+-----------------+
| Anoop Villalobos | ECON         | [place], | [phone] |
|               |              |  [place]  [address]     |                 |
+---------------+--------------+---------------------+-----------------+
| Jennifer Dickson | ECON         | [place]       | [phone] |
|               |              | [address]               |                 |
+---------------+--------------+---------------------+-----------------+
 
 
 
 
 Care Team Providers
 
 
+-----------------------+------+-----------------+
| Care Team Member Name | Role | Phone           |
+-----------------------+------+-----------------+
| Ivy Couch DO      | PCP  | [phone] |
+-----------------------+------+-----------------+
 
 
 
 Reason for Visit
 
 
+-----------+---------------------------+
| Reason    | Comments                  |
+-----------+---------------------------+
| Follow-up | 6 wk f/u possible pd cath |
+-----------+---------------------------+
 
 
 
 Encounter Details
 
 
+--------+---------+----------------------+----------------------+----------------------+
| Date   | Type    | Department           | Care Team            | Description          |
+--------+---------+----------------------+----------------------+----------------------+
| / | Office  |   [facility]      |   Kirt Mclaughlin MD     | PAD (peripheral      |
| 2019   | Visit   | Vascular Surgery     | [address]  | artery disease)      |
|        |         | [address] |  [place] [address]  | ([facility]) (Primary Dx);  |
|        |         |  E  [place], [place]     |  [phone]        | ESRD (end stage      |
|        |         | [address]           | [phone] (Fax)   | renal disease) ([facility]) |
|        |         | [phone]         |                      |                      |
+--------+---------+----------------------+----------------------+----------------------+
 
 
 
 Social History
 
 
+---------------+------------+-----------+--------+------------------+
| Tobacco Use   | Types      | Packs/Day | Years  | Date             |
|               |            |           | Used   |                  |
+---------------+------------+-----------+--------+------------------+
| Former Smoker | Cigarettes | 1         | 30     | Quit: 2004 |
+---------------+------------+-----------+--------+------------------+
 
 
 
+---------------------+---+---+---+
| Smokeless Tobacco:  |   |   |   |
| Never Used          |   |   |   |
+---------------------+---+---+---+
 
 
 
+------------------------------+
| Comments: quite smoking  |
 
+------------------------------+
 
 
 
+-------------+-----------+---------+----------+
| Alcohol Use | Drinks/We | oz/Week | Comments |
|             | ek        |         |          |
+-------------+-----------+---------+----------+
| No          |           |         |          |
+-------------+-----------+---------+----------+
 
 
 
+------------------+---------------+
| Sex Assigned at  | Date Recorded |
| Birth            |               |
+------------------+---------------+
| Not on file      |               |
+------------------+---------------+
 as of this encounter
 
 Last Filed Vital Signs
 
 
+-------------------+---------+-------------------------+
| Vital Sign        | Reading | Time Taken              |
+-------------------+---------+-------------------------+
| Blood Pressure    | 155/77  | 2019  1:14 PM PST |
+-------------------+---------+-------------------------+
| Pulse             | 89      | 2019  1:14 PM PST |
+-------------------+---------+-------------------------+
| Temperature       | -       | -                       |
+-------------------+---------+-------------------------+
| Respiratory Rate  | -       | -                       |
+-------------------+---------+-------------------------+
| Oxygen Saturation | 100%    | 2019  1:14 PM PST |
+-------------------+---------+-------------------------+
| Inhaled Oxygen    | -       | -                       |
| Concentration     |         |                         |
+-------------------+---------+-------------------------+
| Weight            | -       | -                       |
+-------------------+---------+-------------------------+
| Height            | -       | -                       |
+-------------------+---------+-------------------------+
| Body Mass Index   | -       | -                       |
+-------------------+---------+-------------------------+
 in this encounter
 
 Progress Notes
 Kirt Mclaughlin MD - 2019  2:45 PM PSTFormatting of this note may be different from the o
aleksandar.
 Ms. Villalobos is a pleasant 68 y.o. female with PMH significant for acute MI, anemia, Afib, KD
, COPD, CAD, diabetes, ESRD on HD, hyperlipidemia, hypertension who is referred to me for RL
E ulcerations and PD catheter consideration. Patient has ulcerations on her right second toe
 and is seen by both Infectious Disease and Podiatry for this. Patient had recent left foot 
amputation via Dr. Jordan in the end of December. Patient describes that she has ulceration
s on her hands that she had been told has been vascular in etiology. Patient is followed by 
Dr. Elliott, Infectious Disease, and is receiving IV antibiotics on days she has dialysis. Prtaima
ent is also considering peritoneal dialysis catheter but still hesitant about this. Patient 
is seen by Dr. Matias, podiatry. 
 
 
 US Bilateral Lower Extremity Arterial
 
 Impression 
 1. Right leg shows biphasic inflow with a greater than 50% stenosis at 
 the origin of the superficial femoral artery (137-309). Biphasic 
 outflow. 
 2. Two vessel runoff to the right foot. 
 Signed by: Eugenio Hoffman 
 Sign Date/Time: 2019 3:11 PM 
 
 Past Medical History 
 Diagnosis Date 
   Acute MI ([facility])  
  with stenting x 1 
   Anemia associated with chronic renal failure 2016 
   Atrial fibrillation (HCC)  
   Chronic kidney disease  
   Congestive heart failure (HCC)  
   COPD (chronic obstructive pulmonary disease) (HCC)  
   COPD (chronic obstructive pulmonary disease) ([facility]) 2018 
   Coronary artery disease  
  stent placements: 3 total 
   Depression  
   Diabetes other 
  abstracted 
   Diabetes mellitus, type 2 (HCC)  
   ESRD on peritoneal dialysis (HCC)  
   GERD (gastroesophageal reflux disease)  
   Hemodialysis patient ([facility]) 10/2016 
  Stopped peritoneal and restarted hemodialysis 
   Hemorrhage of gastrointestinal tract, unspecified 2017 
  low GI, rectal bleeding 
   High blood pressure other 
  abstracted 
   High cholesterol other 
  abstracted 
   Hyperlipidemia  
   Hypertension  
   Hyponatremia 2017 
   Myocardial infarction (HCC) 2017 
   Other chronic pain  
  feet,  
   Pain of left hip joint 2016 
  due to hip fracture and replacement 
   Partial small bowel obstruction ([facility]) 2017 
  resolved 
   Renal failure  
  Stage 5.   Fistula in the JAN - not on dialysis yet. 
   Unspecified visual disturbance  
  glasses 
 
 Past Surgical History 
 Procedure Laterality Date 
   AV FISTULA PLACEMENT   
  left upper arm AV fistula - failed 
   AV FISTULA REPAIR N/A 10/25/2016 
  Procedure: AV FISTULA - GRAFT REPAIR/REVISION;  Surgeon: Kirt Mclaughlin MD;  Location: [facility]
IN OR;  Service: Vascular;  Laterality: N/A;  Superficialization and revision of left arm fi
stula 
 
   CARDIAC CATHETERIZATION  2017 
   CARPAL TUNNEL RELEASE Bilateral other 
  abstracted 
   CATARACT EXTRACTION Bilateral 2007 
   CATHETER REMOVAL N/A 2016 
  Procedure: DIALYSIS CATHETER - REMOVAL;  Surgeon: Kirt Mclaughlin MD;  Location[facility] OR; 
 Service: Vascular;  Laterality: N/A;  PD cath removal 
   CHOLECYSTECTOMY  other 
  abstracted 
   COLONOSCOPY   
   CORONARY ARTERY BYPASS GRAFT   
   CORONARY STENT PLACEMENT  2017 
  Drug Elutin stents to OM, 1 stent to prox. LAD 
   EYE SURGERY   
   FOOT AMPUTATION Left 2018 
  Procedure: FOREFOOT - AMPUTATION;  Surgeon: Srinivasan Jordan DPM;  Location: [facility] MAIN OR;
  Service: Podiatry;  Laterality: Left; 
   HARDWARE PRESENT   
  stent placements x 3, Left hip replacement, vascular stents 2 in left leg 
   HIP ARTHROPLASTY Left 2016 
  Procedure: HIP - ARTHROPLASTY(ALLISON);  Surgeon: Uriel Roa MD;  Location: [facility] MAIN 
OR;  Service: Orthopedics;  Laterality: Left; 
   HYSTERECTOMY  1998 
  complete 
   PACEMAKER INSERTION   
  [employer] pacemaker/defib 
   PERITONEAL CATHETER INSERTION  8/15/2013 
   PERITONEAL CATHETER INSERTION N/A 2016 
  Procedure: LAPAROSCOPIC - PERITONEAL DIALYSIS CATH INSERTION;  Surgeon: Kirt Mclaughlin MD;  Lo
cation: [facility] MAIN OR;  Service: Vascular;  Laterality: N/A;  Removal of old catheter 
   SHOULDER SURGERY Right  
  frozen shoulder 
   UNLISTED PROCEDURE ARTHROSCOPY   
  total Left hip 
   vascular stents Left 2017 
  leg (2 stents) 
   VASCULAR SURGERY   
 
 Social History 
 Substance Use Topics 
   Smoking status: Former Smoker 
   Packs/day: 1.00 
   Years: 30.00 
   Types: Cigarettes 
   Quit date: 2004 
   Smokeless tobacco: Never Used 
    Comment: quite smoking  
   Alcohol use No 
 
 Family History 
 Problem Relation Age of Onset 
   High cholesterol Father  
   Hypertension Father  
   Diabetes Paternal Grandmother  
   Malig hypertherm Neg Hx  
   Kidney disease Neg Hx  
 
 Current Outpatient Prescriptions on File Prior to Visit 
 Medication Sig Dispense Refill 
   albuterol (PROVENTIL HFA;VENTOLIN HFA) 108 (90 Base) MCG/ACT inhaler Inhale 2 puffs int
 
o the lungs every 4 (four) hours as needed for Wheezing.   
   albuterol (VENTOLIN HFA) 108 (90 Base) MCG/ACT inhaler Ventolin HFA 90 mcg/actuation ae
rosol inhaler
  Inhale 2 puffs every 4 hours by inhalation route as needed.   
   apixaban (ELIQUIS) 2.5 MG tablet Take 1 tablet by mouth 2 (two) times daily. 60 tablet 
0 
   atorvastatin (LIPITOR) 40 MG tablet Take 1 tablet by mouth nightly. 30 tablet 0 
   bisacodyl (DULCOLAX) 10 MG suppository Place 10 mg rectally.   
   calcium acetate (PHOSLO) 667 MG capsule 667 mg 3 (three) times daily with meals.   
   carvedilol (COREG) 12.5 MG tablet Take 1 tablet by mouth 2 (two) times daily with meals
. 60 tablet 0 
   cefTAZidime (FORTAZ) 2 g injection Inject 2 g into the muscle After Hemodialysis (see a
dmin instructions) for 7 days. 1 each  
   Cholecalciferol (VITAMIN D3) 5000 UNITS CAPS Take 5,000 capsules by mouth every other d
ay.   
   clopidogrel (PLAVIX) 75 MG tablet Take 1 tablet by mouth daily. 30 tablet 11 
   esomeprazole (NEXIUM) 20 MG capsule NEXIUM(ESOMEPRAZOLE MAGNESIUM) 20 MG CAPSULE.DR
 Dose: 1 CAP   
   HYDROcodone-acetaminophen (NORCO) 5-325 MG per tablet Take 1 tablet by mouth every 4 (f
our) hours as needed. 30 tablet 0 
   insulin aspart (NOVOLOG) 100 UNIT/ML injection Inject  into the skin 3 (three) times da
melina before meals.   
   insulin degludec (TRESIBA) 100 UNIT/ML injection Tresiba Flextouch U-100(INSULIN DEGLUD
EC) 100 UNIT/1 ML INSULN.PEN
 Dose: 20 UNIT at night   
   isosorbide mononitrate (IMDUR) 60 MG 24 hr tablet Take 1 tablet by mouth daily. 30 tabl
et 1 
   loperamide (IMODIUM) 2 MG capsule 2 mg as needed.   
   LORazepam (ATIVAN) 1 MG tablet Take 1 tablet by mouth every 8 (eight) hours as needed f
or Anxiety. 30 tablet 0 
   Magnesium Cl-Calcium Carbonate (SLOW-MAG) 71.5-119 MG TBEC Take 1 tablet by mouth every
 other day.   
   nitroGLYCERIN (NITROSTAT) 0.4 MG SL tablet Place 1 tablet under the tongue every 5 (fiv
e) minutes as needed for Chest pain. 30 tablet 0 
   nortriptyline (PAMELOR) 25 MG capsule Take 25 mg by mouth 3 (three) times daily. Takes 
25 mg in am , and 75 mg at night   
   ondansetron (ZOFRAN-ODT) 4 MG disintegrating tablet Take 4 mg by mouth as needed.   
   oxybutynin (DITROPAN) 5 MG tablet Take 2.5 mg by mouth 2 (two) times daily.   
   prochlorperazine 5 MG tablet TAKE ONE TABLET BY MOUTH TWICE DAILY AS NEEDED FOR NAUSEA 
60 tablet 11 
   ranitidine (ZANTAC) 150 MG tablet ranitidine 150 mg tablet
  Take 1 tablet twice a day by oral route.   
   rOPINIRole (REQUIP) 0.25 MG tablet Take 0.75 mg by mouth nightly.   
   vancomycin (VANCOCIN) IVPB Inject 750 mg into the vein After Hemodialysis (see admin in
structions) for 7 days. Dilute in appropriate volume of IV fluid and give by slow IV infusio
n.  0 
   Vortioxetine HBr 10 MG TABS 10 mg nightly.   
 
 No current facility-administered medications on file prior to visit.  
 
 Allergies 
 Allergen Reactions 
   Quinapril Hcl Anaphylaxis 
   Digoxin And Related Other (See Comments) 
   toxic 
   Metaxalone Other (See Comments) 
   Morphine Mental Changes 
   Make her crazy/ "funny feeling in my head"
 Has used hydromorphone in-house 
   Pantoprazole Sodium Nausea and Vomiting 
 
   Penicillins Rash 
   Quinapril Hcl Edema 
   Facial Edema 
   Sudafed [Pseudoephedrine Hcl] Rash 
 
 Comprehensive ROS performed and pertinent items described in the HPI. 
 
 Vitals:reviewed
 CONSTITUTIONAL:  Conversant, well developed, NAD
 EYES: Anicteric sclerae, no lid drag, no proptosis
 RESP: Normal effort, regular, even, unlabored rate
 CV: No peripheral edema, rate regular
 SKIN: Pink, warm, dry without rash/lesion
 MS: ROM not limited, no digital cyanosis, normal gait
 NEURO: Cranial nerves II-XII grossly intact, A&O times 3
 PSYCH: appropriate affect, speech and tone, judgement and insight intact
 Vascular: strong biphasic signals in right foot.  
 
 Discussed ultrasound results from 19 with the patient, which show mildly decreased blo
od flow in her right leg. The patient's nonhealing right foot ulcerations are likely due to 
infection and not due to vascular issues. Discussed with the patient that the symptoms in he
r hands are due to vascular access steal syndrome. Recommended against PD catheter due to kurtis rodriguez's past issues concerning adhesions and recommended that the patient stay with HD as lo
ng as she is still tolerating it well. Discussed with the patient that the ulceration in her
 right foot still shows signs of infection and patient was advised to monitor it closely to 
prevent amputation.  If there is still poor healing after infection control, we may consider
 repeating arteriogram at that time. All questions and concerns addressed. Patient will foll
ow up in 1 month. Patient understands and is agreeable. 
 
 Kirt Mclaughlin MD
 
 Attending Note: Documentation assistance provided by Tito Clark (David). Information tammy
rded by the [email or web address] has been reviewed and validated by me. I agree with its contents.
 
 Signed by: David Combs 19, 1:38 PM
 in this encounter
 
 Plan of Treatment
 
 
+--------+---------+-----------+----------------------+-------------+
| Date   | Type    | Specialty | Care Team            | Description |
+--------+---------+-----------+----------------------+-------------+
| 04/10/ | Office  | Podiatry  |   Srinivasan Jordan,  |             |
|    | Visit   |           | D[facility]  [address] [facility] [facility] |             |
|        |         |           |  [address]  [place],  |             |
|        |         |           | [place] [address]             |             |
|        |         |           | [phone]         |             |
|        |         |           | [phone] (Fax)   |             |
+--------+---------+-----------+----------------------+-------------+
 as of this encounter
 
 Visit Diagnoses
 
 
+----------------------------------------------------+
| Diagnosis                                          |
+----------------------------------------------------+
|   PAD (peripheral artery disease) (HCC) - Primary  |
+----------------------------------------------------+
 
|   Unspecified disorders of arteries and arterioles |
+----------------------------------------------------+
|   ESRD (end stage renal disease) (HCC)             |
+----------------------------------------------------+
|   End stage renal disease                          |
+----------------------------------------------------+

## 2019-04-04 NOTE — EKG
[facility]
                                    [address]
                                  [place], [place]  [address]
_________________________________________________________________________________________
                                                                 Signed   
 
 
Sinus rhythm with occasional premature ventricular complexes
Nonspecific intraventricular block
Cannot rule out Septal infarct , age undetermined
T wave abnormality, consider inferior ischemia
Abnormal ECG
No previous ECGs available
Confirmed by LACY TURNER DO ([id number]) on 4/4/2019 3:34:53 PM
 
 
 
 
 
 
 
 
 
 
 
 
 
 
 
 
 
 
 
 
 
 
 
 
 
 
 
 
 
 
 
 
 
 
 
 
 
 
    Electronically Signed By: LACY TURNER DO  04/04/19 1535
_________________________________________________________________________________________
PATIENT NAME:     DANILO JO                          
MEDICAL RECORD #: [id number]                     Electrocardiogram             
          ACCT #: [id number]  
DATE OF BIRTH:   [birth year]                                       
PHYSICIAN:   LACY TURNER DO                     REPORT #: [id number]
REPORT IS CONFIDENTIAL AND NOT TO BE RELEASED WITHOUT AUTHORIZATION

## 2019-04-09 ENCOUNTER — HOSPITAL ENCOUNTER (EMERGENCY)
Dept: HOSPITAL 46 - ED | Age: 69
Discharge: HOME | End: 2019-04-09
Payer: MEDICARE

## 2019-04-09 VITALS — BODY MASS INDEX: 21.47 KG/M2 | WEIGHT: 150 LBS | HEIGHT: 70 IN

## 2019-04-09 DIAGNOSIS — T82.838A: Primary | ICD-10-CM

## 2019-04-09 DIAGNOSIS — Z88.8: ICD-10-CM

## 2019-04-09 DIAGNOSIS — Z79.899: ICD-10-CM

## 2019-04-09 DIAGNOSIS — J44.9: ICD-10-CM

## 2019-04-09 DIAGNOSIS — Z79.82: ICD-10-CM

## 2019-04-09 DIAGNOSIS — Z79.4: ICD-10-CM

## 2019-04-09 DIAGNOSIS — E11.9: ICD-10-CM

## 2019-04-09 DIAGNOSIS — Z88.5: ICD-10-CM

## 2019-04-09 DIAGNOSIS — I50.9: ICD-10-CM

## 2019-04-09 NOTE — XMS
Encounter Summary
  Created on: 2019
 
 Cherie Villalobos
 External Reference #: [id number]
 : 48
 Sex: Female
 
 Demographics
 
 
+-----------------------+--------------------------+
| Address               | [address]               |
|                       | [place]  [address] |
+-----------------------+--------------------------+
| Home Phone            | [phone]          |
+-----------------------+--------------------------+
| Preferred Language    | Unknown                  |
+-----------------------+--------------------------+
| Marital Status        |                   |
+-----------------------+--------------------------+
| Church Affiliation | [id number]                     |
+-----------------------+--------------------------+
| Race                  | Unknown                  |
+-----------------------+--------------------------+
| Ethnic Group          | Unknown                  |
+-----------------------+--------------------------+
 
 
 Author
 
 
+--------------+-----------------------+
| Author       | [employer] |
+--------------+-----------------------+
| Organization | [facility] |
+--------------+-----------------------+
| Address      | Unknown               |
+--------------+-----------------------+
| Phone        | Unavailable           |
+--------------+-----------------------+
 
 
 
 Support
 
 
+---------------+--------------+---------------------+-----------------+
| Name          | Relationship | Address             | Phone           |
+---------------+--------------+---------------------+-----------------+
| Anoop Villalobos | ECON         | [place], | [phone] |
|               |              |  [place]  [address]     |                 |
+---------------+--------------+---------------------+-----------------+
| Jennifer Dickson | ECON         | [place]       | [phone] |
|               |              | [phone]               |                 |
+---------------+--------------+---------------------+-----------------+
 
 
 
 
 Care Team Providers
 
 
+-----------------------+------+-----------------+
| Care Team Member Name | Role | Phone           |
+-----------------------+------+-----------------+
| Ivy Couch DO      | PCP  | [phone] |
+-----------------------+------+-----------------+
 
 
 
 Reason for Visit
 
 
+--------------+-------------------------------------------------------------------+
| Reason       | Comments                                                          |
+--------------+-------------------------------------------------------------------+
| Post-op Exam | Post op, amputation, left foot. Patient pain level is 3/10 and    |
|              | relates she Fx her right hallux. Patient is unsure when her       |
|              | injury took place and went to [facility] ED and had xrays. Patient      |
|              | states she has been walking on the toe since going to the ED.     |
|              | Patient states her left foot continues to phantom pain. Patient   |
|              | states that other than phantom pain her left foot is doing well.  |
+--------------+-------------------------------------------------------------------+
 
 
 
 Encounter Details
 
 
+--------+---------+----------------------+----------------------+----------------------+
| Date   | Type    | Department           | Care Team            | Description          |
+--------+---------+----------------------+----------------------+----------------------+
| / | Office  |   [facility] |   Srinivasan Jordan,  | Closed nondisplaced  |
| 2019   | Visit   |  and Ankle  [address]      | DPM  [address], | fracture of distal   |
|        |         | [address]   |  [address]  [place],  | phalanx of right     |
|        |         | [facility] [place]         | [place] [address]             | great toe with       |
|        |         | [address]           | [phone]         | routine healing,     |
|        |         | [phone]         | [phone] (Fax)   | subsequent encounter |
|        |         |                      |                      |  (Primary Dx);       |
|        |         |                      |                      | Post-operative       |
|        |         |                      |                      | state; Toe pain,     |
|        |         |                      |                      | right                |
+--------+---------+----------------------+----------------------+----------------------+
 
 
 
 Social History
 
 
+---------------+------------+-----------+--------+------------------+
| Tobacco Use   | Types      | Packs/Day | Years  | Date             |
|               |            |           | Used   |                  |
+---------------+------------+-----------+--------+------------------+
| Former Smoker | Cigarettes | 1         | 30     | Quit: 2004 |
+---------------+------------+-----------+--------+------------------+
 
 
 
 
+---------------------+---+---+---+
| Smokeless Tobacco:  |   |   |   |
| Never Used          |   |   |   |
+---------------------+---+---+---+
 
 
 
+------------------------------+
| Comments: quite smoking  |
+------------------------------+
 
 
 
+-------------+-----------+---------+----------+
| Alcohol Use | Drinks/We | oz/Week | Comments |
|             | ek        |         |          |
+-------------+-----------+---------+----------+
| No          |           |         |          |
+-------------+-----------+---------+----------+
 
 
 
+------------------+---------------+
| Sex Assigned at  | Date Recorded |
| Birth            |               |
+------------------+---------------+
| Not on file      |               |
+------------------+---------------+
 as of this encounter
 
 Last Filed Vital Signs
 
 
+-------------------+---------------------+-------------------------+
| Vital Sign        | Reading             | Time Taken              |
+-------------------+---------------------+-------------------------+
| Blood Pressure    | 124/60              | 2019  2:40 PM PST |
+-------------------+---------------------+-------------------------+
| Pulse             | 92                  | 2019  2:40 PM PST |
+-------------------+---------------------+-------------------------+
| Temperature       | 36.8   C (98.2   F) | 2019  2:40 PM PST |
+-------------------+---------------------+-------------------------+
| Respiratory Rate  | -                   | -                       |
+-------------------+---------------------+-------------------------+
| Oxygen Saturation | 97%                 | 2019  2:40 PM PST |
+-------------------+---------------------+-------------------------+
| Inhaled Oxygen    | -                   | -                       |
| Concentration     |                     |                         |
+-------------------+---------------------+-------------------------+
| Weight            | -                   | -                       |
+-------------------+---------------------+-------------------------+
| Height            | -                   | -                       |
+-------------------+---------------------+-------------------------+
| Body Mass Index   | -                   | -                       |
+-------------------+---------------------+-------------------------+
 in this encounter
 
 Progress Notes
 Srinivasan Jordan DPM - 2019  2:45 PM PSTFormatting of this note may be different fro
m the original.
 
  
 Subjective: 
  Patient ID: Cherie Villalobos is a 68 y.o. female.
 Chief Complaint 
 Patient presents with 
   Post-op Exam 
   Post op, amputation, left foot. Patient pain level is 3/10 and relates she Fx her right h
allux. Patient is unsure when her injury took place and went to [facility] ED and had xrays. Cindi
nt states she has been walking on the toe since going to the ED.  Patient states her left fo
ot continues to phantom pain. Patient states that other than phantom pain her left foot is d
oing well.  
  
 
 HPI
 Patient returns today status post TMA left on 18.  She reports that she suffered a fa
ll and may have broken her right great toe.  Initially in the hospital she reports that she 
would told she would need amputation of the toe.  It is looking better over time but slowly.
 Her left foot is healing well after Trans-metatarsal amputation, no concerns with that. 
 
 The following portions of the patient's history were reviewed and updated as appropriate an
d is available elsewhere in the record: allergies, current medications, past family history,
 past medical history, past social history, past surgical history and problem list.
 
 ROS
 Denies any nausea, vomiting, fever, chills, shortness of breath, chest pain, or calf pain 
 
     
 Objective: 
 /60  | Pulse 92  | Temp 98.2 F (36.8 C)  | SpO2 97% 
 General observation:
 Constitutional: The patient is alert and oriented to person, place and time
 Psychiatric: The patient has normal affect and mood; her speech is normal; her behavior is 
normal.
 Respiratory: Effort normal and breath sounds normal. No accessory muscle usage. No respirat
ory distress.   
 
 Lower Extremity Examination:
 Trans-metatarsal amputation left, site healing well, two very small superficial wounds
 Wound, irregular and linear dorsal right great toe, no erythema, mild edema of toe
 No skin mottling. No hyperesthesias or paresthesias.
 No calf or thigh pain. Negative Homans sign. 
 pain on palpation of the interphalangeal joint of the hallux right dorsal
 
 Xr Toe Right 2 View
 
 Result Date: 2019
 No radiographic evidence of osteomyelitis seen.  If further assessment is warranted, consid
er correlation with MRI. Potential acute fracture through the base of the distal phalanx. Si
gned by: Gavin South Sign Date/Time: 2019 5:15 PM
 
 Us Lower Extremty  Arterial Right
 
 Result Date: 2019
 1. Right leg shows biphasic inflow with a greater than 50% stenosis at the origin of the dobbins
perficial femoral artery ([id number]).  Biphasic outflow. 2. Two vessel runoff to the right jeanne
t. Signed by: Eugenio Hoffman Sign Date/Time: 2019 3:11 PM
 
 Radiographs: .  See epic chart for complete read
 
 X-ray Foot Left
 
 
 Result Date: 2018
 Amputation of the left forefoot at the level of the proximal metatarsals. Surgical cutaneou
s staples are present. No radiographic evidence for osteomyelitis. Normal alignment of the h
indfoot. Mild degeneration of the midfoot. Electronically signed by Oleksandr Lemus MD on 2018 10:12 AM
 
 Ct Head Without Contrast
 
 Result Date: 2018
 1.  No acute intracranial pathology. Electronically signed by Steven Samano MD on  5:28 PM
 
 X-ray Chest 1 View
 
 Result Date: 2019
 1.  Small loculated pleural fluid collection seen overlying the lateral left heart border i
n the lower left chest.  There may also be a small pleural effusion at the right lung base w
hich is layering posteriorly. 2.  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 Electronically s
igned by Eugenio Hoffman DO on 2019 4:59 PM
 
 Angioplasty 18:
 1. Aorta with narrow aortic bifurcation with moderate stenosis of proximal left common errol
c artery. 
 2. Left SFA with moderate stenosis proximally. 
 3. Single vessel runoff to left foot via left anterior tibial artery. 
 4. Left posterior tibial artery and peroneal artery were occluded with reconstitution of di
stal posterior tibial artery at the left ankle via collaterals. 
 5. If forefoot amputation does not heal, may need popliteal to posterior tibial artery bypa
ss versus antegrade access of left common femoral artery with posterior tibial artery recana
lization. 
  
 Electronically signed by Kirt Mclaughlin MD on 2018 10:51 AM 
 
    
 Assessment and Plan: 
 S/p: TMA left on 18
 DM, ESRD, PAD
 Healing open fracture of right great toe?
 
 Radiographs taken, read and reviewed of involved foot/ankle and findings were discussed wit
h the patient
 Irregularity at the base of the distal phalanx concerning for avulsion fracture
 Wound appears stable dorsal right great toe
 Dressing change/applied today with dry sterile
 Rx diabetic shoes with toe filler left
 Continue use of removable cast boot until shoes received
 Fit with surgical shoe right, wear for all activities until DM shoes obtained
 Dress wound right great toe with bacitracin ointment daily with dry sterile dressing
 F/u 2 weeks
 
 Srinivasan Jordan DPM
  in this encounter
 
 Plan of Treatment
 
 
+--------+---------+-----------+----------------------+-------------+
| Date   | Type    | Specialty | Care Team            | Description |
+--------+---------+-----------+----------------------+-------------+
 
| 04/10/ | Office  | Podiatry  |   Srinivasan Jordan FAY,  |             |
|    | Visit   |           | DPM  [address], |             |
|        |         |           |  [address]  [place],  |             |
|        |         |           | [place] [address]             |             |
|        |         |           | [phone]         |             |
|        |         |           | [phone] (Fax)   |             |
+--------+---------+-----------+----------------------+-------------+
 as of this encounter
 
 Results
 X-ray foot right 3+ views (2019  2:58 PM)
 
+------------------------------------------------------------------------+--------------+
| Impressions                                                            | Performed At |
+------------------------------------------------------------------------+--------------+
|   Linear lucency/irregularity at the distal 1st phalanx, may represent |   [facility]     |
|   minimally displaced fracture.  Signed by: Oleksandr Lemus  Sign         | RADIOLOGY    |
| Date/Time: 2019 10:20 AM                                         |              |
+------------------------------------------------------------------------+--------------+
 
 
 
+------------------------------------------------------------------------+--------------+
| Narrative                                                              | Performed At |
+------------------------------------------------------------------------+--------------+
|   RIGHT FOOT THREE VIEWS  CLINICAL INFORMATION:  Possible fracture of  |   [facility]     |
| right hallux.  COMPARISON:  XR TOES RIGHT (2019); XR FOOT LEFT    | RADIOLOGY    |
| (2018);  FINDINGS:  No acute fracture or dislocation.  Small     |              |
| calcaneal plantar enthesophyte.  Linear lucency/irregularity at the    |              |
| distal 1st phalanx, may represent  minimally displaced fracture.  Mild |              |
|  midfoot degeneration.                                                 |              |
+------------------------------------------------------------------------+--------------+
 
 
 
+-------------------------------------------------------------------------+
| Procedure Note                                                          |
+-------------------------------------------------------------------------+
|   Lauro Baldwin Results In - 2019 10:23 AM PST  RIGHT FOOT THREE VIEWS |
| CLINICAL INFORMATION:                                                   |
| Possible fracture of right hallux.                                      |
| COMPARISON:                                                             |
| XR TOES RIGHT (2019); XR FOOT LEFT (2018);                   |
| FINDINGS:                                                               |
| No acute fracture or dislocation.                                       |
| Small calcaneal plantar enthesophyte.                                   |
| Linear lucency/irregularity at the distal 1st phalanx, may represent    |
| minimally displaced fracture.                                           |
| Mild midfoot degeneration.                                              |
| IMPRESSION:                                                             |
| Linear lucency/irregularity at the distal 1st phalanx, may represent    |
| minimally displaced fracture.                                           |
| Signed by: Oleksandr Lemus                                                 |
| Sign Date/Time: 2019 10:20 AM                                     |
+-------------------------------------------------------------------------+
 
 
 
+--------------------+------------------+--------------------+--------------+
| Performing         | Address          | City/State/Zipcode | Phone Number |
 
| Organization       |                  |                    |              |
+--------------------+------------------+--------------------+--------------+
|   [facility] |   [address] | [place] [address] |              |
+--------------------+------------------+--------------------+--------------+
 X-ray foot left 3 + views (2019  2:58 PM)
 
+----------------------------------------------------------------------+--------------+
| Impressions                                                          | Performed At |
+----------------------------------------------------------------------+--------------+
|   No acute fracture.    No radiographic evidence for osteomyelitis   |   [facility]     |
| Signed by: Oleksandr Lemus Date/Time: 2019 10:21 AM         | RADIOLOGY    |
+----------------------------------------------------------------------+--------------+
 
 
 
+------------------------------------------------------------------------+--------------+
| Narrative                                                              | Performed At |
+------------------------------------------------------------------------+--------------+
|   LEFT FOOT THREE VIEWS  CLINICAL INFORMATION:  8 weeks post op,       |   [facility]     |
| forefoot amputation.  COMPARISON:  XR TOES RIGHT (2019); XR FOOT  | RADIOLOGY    |
| LEFT (2018); XR FOOT LEFT  (2018);  FINDINGS:  Amputation  |              |
| of the forefoot at the level of the proximal metacarpals.  No          |              |
| radiographic evidence for osteomyelitis.  No acute fractures.          |              |
+------------------------------------------------------------------------+--------------+
 
 
 
+------------------------------------------------------------------------+
| Procedure Note                                                         |
+------------------------------------------------------------------------+
|   Lauro Baldwin Results In 2019 10:25 AM PST  LEFT FOOT THREE VIEWS |
| CLINICAL INFORMATION:                                                  |
| 8 weeks post op, forefoot amputation.                                  |
| COMPARISON:                                                            |
| XR TOES RIGHT (2019); XR FOOT LEFT (2018); XR FOOT LEFT     |
| (2018);                                                          |
| FINDINGS:                                                              |
| Amputation of the forefoot at the level of the proximal metacarpals.   |
| No radiographic evidence for osteomyelitis.                            |
| No acute fractures.                                                    |
| IMPRESSION:                                                            |
| No acute fracture.  No radiographic evidence for osteomyelitis         |
| Signed by: Oleksandr Lemus                                                |
| Sign Date/Time: 2019 10:21 AM                                    |
+------------------------------------------------------------------------+
 
 
 
+--------------------+------------------+--------------------+--------------+
| Performing         | Address          | City/State/Zipcode | Phone Number |
| Organization       |                  |                    |              |
+--------------------+------------------+--------------------+--------------+
|   [facility] |   [address] | [place] [address] |              |
+--------------------+------------------+--------------------+--------------+
 in this encounter
 
 Visit Diagnoses
 
 
+-----------------------------------------------------------------------------------+
 
| Diagnosis                                                                         |
+-----------------------------------------------------------------------------------+
|   Closed nondisplaced fracture of distal phalanx of right great toe with routine  |
| healing, subsequent encounter - Primary                                           |
+-----------------------------------------------------------------------------------+
|   Post-operative state                                                            |
+-----------------------------------------------------------------------------------+
|   Other postprocedural status                                                     |
+-----------------------------------------------------------------------------------+
|   Toe pain, right                                                                 |
+-----------------------------------------------------------------------------------+
|   Pain in limb                                                                    |
+-----------------------------------------------------------------------------------+

## 2019-04-09 NOTE — XMS
Encounter Summary
  Created on: 2019
 
 Cherie Villalobos
 External Reference #: [id number]
 : 48
 Sex: Female
 
 Demographics
 
 
+-----------------------+--------------------------+
| Address               | [address]               |
|                       | [place]  [address] |
+-----------------------+--------------------------+
| Home Phone            | [phone]          |
+-----------------------+--------------------------+
| Preferred Language    | Unknown                  |
+-----------------------+--------------------------+
| Marital Status        |                   |
+-----------------------+--------------------------+
| [employer] Affiliation | 1013                     |
+-----------------------+--------------------------+
| Race                  | Unknown                  |
+-----------------------+--------------------------+
| Ethnic Group          | Unknown                  |
+-----------------------+--------------------------+
 
 
 Author
 
 
+--------------+-----------------------+
| Author       | Sherry [employer] |
+--------------+-----------------------+
| Organization | [facility] [facility] |
+--------------+-----------------------+
| Address      | Unknown               |
+--------------+-----------------------+
| Phone        | Unavailable           |
+--------------+-----------------------+
 
 
 
 Support
 
 
+---------------+--------------+---------------------+-----------------+
| Name          | Relationship | Address             | Phone           |
+---------------+--------------+---------------------+-----------------+
| Anoop Villalobos | ECON         | [place], | [phone] |
|               |              |  [place]  [address]     |                 |
+---------------+--------------+---------------------+-----------------+
| Jennifer Dickson | ECON         | [place]       | [phone] |
|               |              | [phone]               |                 |
+---------------+--------------+---------------------+-----------------+
 
 
 
 
 Care Team Providers
 
 
+-----------------------+------+-----------------+
| Care Team Member Name | Role | Phone           |
+-----------------------+------+-----------------+
| Ivy Couch DO      | PCP  | [phone] |
+-----------------------+------+-----------------+
 
 
 
 Reason for Visit
 
 
+--------+------------------------------------------------------+
| Reason | Comments                                             |
+--------+------------------------------------------------------+
| Other  | [facility] Round[facility] Note |
+--------+------------------------------------------------------+
 
 
 
 Encounter Details
 
 
+--------+-------------+----------------------+----------------------+----------------------
+
| Date   | Type        | Department           | Care Team            | Description          
|
+--------+-------------+----------------------+----------------------+----------------------
+
| / | Documentati |   [facility]      |   João Asher MD  | Other (December      
|
| 2019   | on Only     | [address]        |  [address]   | [facility] Provider 
|
|        |             | [address]   | [address]  [place], [place]    |  Dialysis Rounding   
|
|        |             | [place] [address]   | [id number] [phone]  | Note)                
|
|        |             | [phone]         |  [phone] (Fax)  |                      
|
+--------+-------------+----------------------+----------------------+----------------------
+
 
 
 
 Social History
 
 
+---------------+------------+-----------+--------+------------------+
| Tobacco Use   | Types      | Packs/Day | Years  | Date             |
|               |            |           | Used   |                  |
+---------------+------------+-----------+--------+------------------+
| Former Smoker | Cigarettes | 1         | 30     | Quit: 2004 |
+---------------+------------+-----------+--------+------------------+
 
 
 
+---------------------+---+---+---+
 
| Smokeless Tobacco:  |   |   |   |
| Never Used          |   |   |   |
+---------------------+---+---+---+
 
 
 
+------------------------------+
| Comments: quite smoking  |
+------------------------------+
 
 
 
+-------------+-----------+---------+----------+
| Alcohol Use | Drinks/We | oz/Week | Comments |
|             | ek        |         |          |
+-------------+-----------+---------+----------+
| No          |           |         |          |
+-------------+-----------+---------+----------+
 
 
 
+------------------+---------------+
| Sex Assigned at  | Date Recorded |
| Birth            |               |
+------------------+---------------+
| Not on file      |               |
+------------------+---------------+
 as of this encounter
 
 Plan of Treatment
 
 
+--------+---------+-----------+----------------------+-------------+
| Date   | Type    | Specialty | Care Team            | Description |
+--------+---------+-----------+----------------------+-------------+
| 04/10/ | Office  | Podiatry  |   Srinivasan Jordan,  |             |
|    | Visit   |           | DPM  [address], |             |
|        |         |           |  [address]  [place],  |             |
|        |         |           | [place] [address]             |             |
|        |         |           | [phone]         |             |
|        |         |           | [phone] (Fax)   |             |
+--------+---------+-----------+----------------------+-------------+
 as of this encounter
 
 Visit Diagnoses
 Not on filein this encounter

## 2019-04-09 NOTE — XMS
Encounter Summary
  Created on: 2019
 
 Cherie Villalobos
 External Reference #: [id number]
 : 48
 Sex: Female
 
 Demographics
 
 
+-----------------------+--------------------------+
| Address               | [address]               |
|                       | [place]  [address] |
+-----------------------+--------------------------+
| Home Phone            | [phone]          |
+-----------------------+--------------------------+
| Preferred Language    | Unknown                  |
+-----------------------+--------------------------+
| Marital Status        |                   |
+-----------------------+--------------------------+
| Islam Affiliation | 1013                     |
+-----------------------+--------------------------+
| Race                  | Unknown                  |
+-----------------------+--------------------------+
| Ethnic Group          | Unknown                  |
+-----------------------+--------------------------+
 
 
 Author
 
 
+--------------+-----------------------+
| Author       | [employer] [employer] |
+--------------+-----------------------+
| Organization | [facility] |
+--------------+-----------------------+
| Address      | Unknown               |
+--------------+-----------------------+
| Phone        | Unavailable           |
+--------------+-----------------------+
 
 
 
 Support
 
 
+---------------+--------------+---------------------+-----------------+
| Name          | Relationship | Address             | Phone           |
+---------------+--------------+---------------------+-----------------+
| Anoop Villalobos | ECON         | [place], | [phone] |
|               |              |  [place]  [address]     |                 |
+---------------+--------------+---------------------+-----------------+
| Jennifer Dickson | ECON         | [place]       | [phone] |
|               |              | [address]               |                 |
+---------------+--------------+---------------------+-----------------+
 
 
 
 
 Care Team Providers
 
 
+-----------------------+------+-----------------+
| Care Team Member Name | Role | Phone           |
+-----------------------+------+-----------------+
| Ivy Couch DO      | PCP  | [phone] |
+-----------------------+------+-----------------+
 
 
 
 Reason for Visit
 
 
+-----------+------------------------------------------------------------------+
| Reason    | Comments                                                         |
+-----------+------------------------------------------------------------------+
| Follow-up | Here today for follow up for right toes, left foot. States that  |
|           | there is a spot on the top of the amputation that hasn't fully   |
|           | closed up. Has been having a lot of phantom pain in the right    |
|           | foot. Has been taking tramadol. Pain 4/10 currently can get to   |
|           | 8/10.                                                            |
+-----------+------------------------------------------------------------------+
 
 
 
 Encounter Details
 
 
+--------+---------+----------------------+----------------------+----------------------+
| Date   | Type    | Department           | Care Team            | Description          |
+--------+---------+----------------------+----------------------+----------------------+
| / | Office  |   [facility] |   Srinivasan Jordan,  | Closed nondisplaced  |
| 2019   | Visit   |  and Ankle  [address]      | DPM  [address], | fracture of distal   |
|        |         | [address]   |  [address]  [place],  | phalanx of right     |
|        |         | [place], [place]         | [place] [address]             | great toe with       |
|        |         | [address]           | [phone]         | routine healing,     |
|        |         | [phone]         | [phone] (Fax)   | subsequent encounter |
|        |         |                      |                      |  (Primary Dx);       |
|        |         |                      |                      | Post-operative       |
|        |         |                      |                      | state; Open wound of |
|        |         |                      |                      |  toe, subsequent     |
|        |         |                      |                      | encounter            |
+--------+---------+----------------------+----------------------+----------------------+
 
 
 
 Social History
 
 
+---------------+------------+-----------+--------+------------------+
| Tobacco Use   | Types      | Packs/Day | Years  | Date             |
|               |            |           | Used   |                  |
+---------------+------------+-----------+--------+------------------+
| Former Smoker | Cigarettes | 1         | 30     | Quit: 2004 |
+---------------+------------+-----------+--------+------------------+
 
 
 
 
+---------------------+---+---+---+
| Smokeless Tobacco:  |   |   |   |
| Never Used          |   |   |   |
+---------------------+---+---+---+
 
 
 
+------------------------------+
| Comments: quite smoking  |
+------------------------------+
 
 
 
+-------------+-----------+---------+----------+
| Alcohol Use | Drinks/We | oz/Week | Comments |
|             | ek        |         |          |
+-------------+-----------+---------+----------+
| No          |           |         |          |
+-------------+-----------+---------+----------+
 
 
 
+------------------+---------------+
| Sex Assigned at  | Date Recorded |
| Birth            |               |
+------------------+---------------+
| Not on file      |               |
+------------------+---------------+
 as of this encounter
 
 Last Filed Vital Signs
 
 
+-------------------+---------+-------------------------+
| Vital Sign        | Reading | Time Taken              |
+-------------------+---------+-------------------------+
| Blood Pressure    | 109/52  | 2019 11:53 AM PST |
+-------------------+---------+-------------------------+
| Pulse             | 71      | 2019 11:53 AM PST |
+-------------------+---------+-------------------------+
| Temperature       | -       | -                       |
+-------------------+---------+-------------------------+
| Respiratory Rate  | -       | -                       |
+-------------------+---------+-------------------------+
| Oxygen Saturation | 96%     | 2019 11:53 AM PST |
+-------------------+---------+-------------------------+
| Inhaled Oxygen    | -       | -                       |
| Concentration     |         |                         |
+-------------------+---------+-------------------------+
| Weight            | -       | -                       |
+-------------------+---------+-------------------------+
| Height            | -       | -                       |
+-------------------+---------+-------------------------+
| Body Mass Index   | -       | -                       |
+-------------------+---------+-------------------------+
 in this encounter
 
 Progress Notes
 Srinivasan Jordan, REBEKAH - 2019  1:45 PM PSTFormatting of this note may be different fro
m the original.
 
  
 Subjective: 
  Patient ID: Cherie Villalobos is a 69 y.o. female.
 Chief Complaint 
 Patient presents with 
   Follow-up 
   Here today for follow up for right toes, left foot. States that there is a spot on the to
p of the amputation that hasn't fully closed up. Has been having a lot of phantom pain in th
e right foot. Has been taking tramadol. Pain 4/10 currently can get to 8/10. 
  
 
 HPI
 Patient returns today follow-up right great toe wound right foot
 Transmetatarsal amputation left foot remains healed.  She is in the process of obtaining ne
w diabetic shoes and inserts.
 
 The following portions of the patient's history were reviewed and updated as appropriate an
d is available elsewhere in the record: allergies, current medications, past family history,
 past medical history, past social history, past surgical history and problem list.
 
 ROS
 Denies any nausea, vomiting, fever, chills, shortness of breath, chest pain, or calf pain 
 
     
 Objective: 
 /52  | Pulse 71  | SpO2 96% 
 General observation:
 Constitutional: The patient is alert and oriented to person, place and time
 Psychiatric: The patient has normal affect and mood; her speech is normal; her behavior is 
normal.
 Respiratory: Effort normal and breath sounds normal. No accessory muscle usage. No respirat
ory distress.   
 
 Lower Extremity Examination:
 Trans-metatarsal amputation left, site healing well, two very small superficial wounds
 Wound, irregular and linear dorsal right great toe, no erythema, mild edema of toe, improve
d
 Toenail right great is thickened, may come loose over time
 No skin mottling. No hyperesthesias or paresthesias.
 No calf or thigh pain. Negative Homans sign. 
 No pain on palpation of the interphalangeal joint of the hallux right dorsal
 
 Xr Toe Right 2 View
 
 Result Date: 2019
 No radiographic evidence of osteomyelitis seen.  If further assessment is warranted, consid
er correlation with MRI. Potential acute fracture through the base of the distal phalanx. Si
gned by: Gavin South Sign Date/Time: 2019 5:15 PM
 
 Us Lower Extremty  Arterial Right
 
 Result Date: 2019
 1. Right leg shows biphasic inflow with a greater than 50% stenosis at the origin of the dobbins
perficial femoral artery ([id number]).  Biphasic outflow. 2. Two vessel runoff to the right jeanne
t. Signed by: Eugenio Hoffman Sign Date/Time: 2019 3:11 PM
 
 Radiographs: .  See epic chart for complete read
 
 X-ray Foot Left
 
 
 Result Date: 2018
 Amputation of the left forefoot at the level of the proximal metatarsals. Surgical cutaneou
s staples are present. No radiographic evidence for osteomyelitis. Normal alignment of the h
indfoot. Mild degeneration of the midfoot. Electronically signed by Oleksandr Lemus MD on 2018 10:12 AM
 
 Ct Head Without Contrast
 
 Result Date: 2018
 1.  No acute intracranial pathology. Electronically signed by Steven Samano MD on  5:28 PM
 
 X-ray Chest 1 View
 
 Result Date: 2019
 1.  Small loculated pleural fluid collection seen overlying the lateral left heart border i
n the lower left chest.  There may also be a small pleural effusion at the right lung base w
hich is layering posteriorly. 2.  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 Electronically s
igned by Eugenio Hoffman DO on 2019 4:59 PM
 
 Angioplasty 18:
 1. Aorta with narrow aortic bifurcation with moderate stenosis of proximal left common errol
c artery. 
 2. Left SFA with moderate stenosis proximally. 
 3. Single vessel runoff to left foot via left anterior tibial artery. 
 4. Left posterior tibial artery and peroneal artery were occluded with reconstitution of di
stal posterior tibial artery at the left ankle via collaterals. 
 5. If forefoot amputation does not heal, may need popliteal to posterior tibial artery bypa
ss versus antegrade access of left common femoral artery with posterior tibial artery recana
lization. 
  
 Electronically signed by Kirt Mclaughlin MD on 2018 10:51 AM 
 
    
 Assessment and Plan: 
 S/p: TMA left on 18
 DM, ESRD, PAD
 Open wound right great toe
 
 Rx mupirocin
 Continue use of surgical shoe right, removable cast boot left until diabetic shoes and inse
rts obtained
 Then transition to the use
 Follow-up in 1 month
 
 Srinivasan Jordan DPM
  in this encounter
 
 Plan of Treatment
 
 
+--------+---------+-----------+----------------------+-------------+
| Date   | Type    | Specialty | Care Team            | Description |
+--------+---------+-----------+----------------------+-------------+
| 04/10/ | Office  | Podiatry  |   Srinivasan Jordan,  |             |
|    | Visit   |           | DPM  [address], |             |
|        |         |           |  [address]  [place],  |             |
|        |         |           | [place] [address]             |             |
|        |         |           | [phone]         |             |
|        |         |           | [phone] (Fax)   |             |
 
+--------+---------+-----------+----------------------+-------------+
 as of this encounter
 
 Visit Diagnoses
 
 
+-----------------------------------------------------------------------------------+
| Diagnosis                                                                         |
+-----------------------------------------------------------------------------------+
|   Closed nondisplaced fracture of distal phalanx of right great toe with routine  |
| healing, subsequent encounter - Primary                                           |
+-----------------------------------------------------------------------------------+
|   Post-operative state                                                            |
+-----------------------------------------------------------------------------------+
|   Other postprocedural status                                                     |
+-----------------------------------------------------------------------------------+
|   Open wound of toe, subsequent encounter                                         |
+-----------------------------------------------------------------------------------+

## 2019-04-09 NOTE — XMS
Encounter Summary
  Created on: 2019
 
 Cherie Villalobos
 External Reference #: [id number]
 : 48
 Sex: Female
 
 Demographics
 
 
+-----------------------+--------------------------+
| Address               | [address]               |
|                       | [place]  [address] |
+-----------------------+--------------------------+
| Home Phone            | [phone]          |
+-----------------------+--------------------------+
| Preferred Language    | Unknown                  |
+-----------------------+--------------------------+
| Marital Status        |                   |
+-----------------------+--------------------------+
| Advent Affiliation | 1013                     |
+-----------------------+--------------------------+
| Race                  | Unknown                  |
+-----------------------+--------------------------+
| Ethnic Group          | Unknown                  |
+-----------------------+--------------------------+
 
 
 Author
 
 
+--------------+-----------------------+
| Author       | [employer] |
+--------------+-----------------------+
| Organization | [facility] |
+--------------+-----------------------+
| Address      | Unknown               |
+--------------+-----------------------+
| Phone        | Unavailable           |
+--------------+-----------------------+
 
 
 
 Support
 
 
+---------------+--------------+---------------------+-----------------+
| Name          | Relationship | Address             | Phone           |
+---------------+--------------+---------------------+-----------------+
| Anoop Villalobos | ECON         | [place], | [phone] |
|               |              |  [place]  [address]     |                 |
+---------------+--------------+---------------------+-----------------+
| Jennifer Dickson | ECON         | [place]       | [phone] |
|               |              | [address]               |                 |
+---------------+--------------+---------------------+-----------------+
 
 
 
 
 Care Team Providers
 
 
+-----------------------+------+-----------------+
| Care Team Member Name | Role | Phone           |
+-----------------------+------+-----------------+
| Ivy Couch DO      | PCP  | [phone] |
+-----------------------+------+-----------------+
 
 
 
 Reason for Visit
 
 
+-----------+---------------------------+
| Reason    | Comments                  |
+-----------+---------------------------+
| Follow-up | 6 wk f/u possible pd cath |
+-----------+---------------------------+
 
 
 
 Encounter Details
 
 
+--------+---------+----------------------+----------------------+----------------------+
| Date   | Type    | Department           | Care Team            | Description          |
+--------+---------+----------------------+----------------------+----------------------+
| / | Office  |   [facility]      |   Kirt Mclaughlin MD     | PAD (peripheral      |
| 2019   | Visit   | Vascular Surgery     | [address]  | artery disease)      |
|        |         | [address] |  [place] [address]  | ([facility]) (Primary Dx);  |
|        |         |  E  [place], [place]     |  [phone]        | ESRD (end stage      |
|        |         | [address]           | [phone] (Fax)   | renal disease) ([facility]) |
|        |         | [phone]         |                      |                      |
+--------+---------+----------------------+----------------------+----------------------+
 
 
 
 Social History
 
 
+---------------+------------+-----------+--------+------------------+
| Tobacco Use   | Types      | Packs/Day | Years  | Date             |
|               |            |           | Used   |                  |
+---------------+------------+-----------+--------+------------------+
| Former Smoker | Cigarettes | 1         | 30     | Quit: 2004 |
+---------------+------------+-----------+--------+------------------+
 
 
 
+---------------------+---+---+---+
| Smokeless Tobacco:  |   |   |   |
| Never Used          |   |   |   |
+---------------------+---+---+---+
 
 
 
+------------------------------+
| Comments: quite smoking  |
 
+------------------------------+
 
 
 
+-------------+-----------+---------+----------+
| Alcohol Use | Drinks/We | oz/Week | Comments |
|             | ek        |         |          |
+-------------+-----------+---------+----------+
| No          |           |         |          |
+-------------+-----------+---------+----------+
 
 
 
+------------------+---------------+
| Sex Assigned at  | Date Recorded |
| Birth            |               |
+------------------+---------------+
| Not on file      |               |
+------------------+---------------+
 as of this encounter
 
 Last Filed Vital Signs
 
 
+-------------------+---------+-------------------------+
| Vital Sign        | Reading | Time Taken              |
+-------------------+---------+-------------------------+
| Blood Pressure    | 155/77  | 2019  1:14 PM PST |
+-------------------+---------+-------------------------+
| Pulse             | 89      | 2019  1:14 PM PST |
+-------------------+---------+-------------------------+
| Temperature       | -       | -                       |
+-------------------+---------+-------------------------+
| Respiratory Rate  | -       | -                       |
+-------------------+---------+-------------------------+
| Oxygen Saturation | 100%    | 2019  1:14 PM PST |
+-------------------+---------+-------------------------+
| Inhaled Oxygen    | -       | -                       |
| Concentration     |         |                         |
+-------------------+---------+-------------------------+
| Weight            | -       | -                       |
+-------------------+---------+-------------------------+
| Height            | -       | -                       |
+-------------------+---------+-------------------------+
| Body Mass Index   | -       | -                       |
+-------------------+---------+-------------------------+
 in this encounter
 
 Progress Notes
 Kirt Mclaughlin MD - 2019  2:45 PM PSTFormatting of this note may be different from the o
aleksandar.
 Ms. Villalobos is a pleasant 68 y.o. female with PMH significant for acute MI, anemia, Afib, KD
, COPD, CAD, diabetes, ESRD on HD, hyperlipidemia, hypertension who is referred to me for RL
E ulcerations and PD catheter consideration. Patient has ulcerations on her right second toe
 and is seen by both Infectious Disease and Podiatry for this. Patient had recent left foot 
amputation via Dr. Jordan in the end of December. Patient describes that she has ulceration
s on her hands that she had been told has been vascular in etiology. Patient is followed by 
Dr. Elliott, Infectious Disease, and is receiving IV antibiotics on days she has dialysis. Pratima
ent is also considering peritoneal dialysis catheter but still hesitant about this. Patient 
is seen by Dr. Matias, podiatry. 
 
 
 US Bilateral Lower Extremity Arterial
 
 Impression 
 1. Right leg shows biphasic inflow with a greater than 50% stenosis at 
 the origin of the superficial femoral artery (137-309). Biphasic 
 outflow. 
 2. Two vessel runoff to the right foot. 
 Signed by: Eugenio Hoffman 
 Sign Date/Time: 2019 3:11 PM 
 
 Past Medical History 
 Diagnosis Date 
   Acute MI ([facility])  
  with stenting x 1 
   Anemia associated with chronic renal failure 2016 
   Atrial fibrillation (HCC)  
   Chronic kidney disease  
   Congestive heart failure (HCC)  
   COPD (chronic obstructive pulmonary disease) (HCC)  
   COPD (chronic obstructive pulmonary disease) ([facility]) 2018 
   Coronary artery disease  
  stent placements: 3 total 
   Depression  
   Diabetes other 
  abstracted 
   Diabetes mellitus, type 2 (HCC)  
   ESRD on peritoneal dialysis (HCC)  
   GERD (gastroesophageal reflux disease)  
   Hemodialysis patient ([facility]) 10/2016 
  Stopped peritoneal and restarted hemodialysis 
   Hemorrhage of gastrointestinal tract, unspecified 2017 
  low GI, rectal bleeding 
   High blood pressure other 
  abstracted 
   High cholesterol other 
  abstracted 
   Hyperlipidemia  
   Hypertension  
   Hyponatremia 2017 
   Myocardial infarction (HCC) 2017 
   Other chronic pain  
  feet,  
   Pain of left hip joint 2016 
  due to hip fracture and replacement 
   Partial small bowel obstruction ([facility]) 2017 
  resolved 
   Renal failure  
  Stage 5.   Fistula in the JAN - not on dialysis yet. 
   Unspecified visual disturbance  
  glasses 
 
 Past Surgical History 
 Procedure Laterality Date 
   AV FISTULA PLACEMENT   
  left upper arm AV fistula - failed 
   AV FISTULA REPAIR N/A 10/25/2016 
  Procedure: AV FISTULA - GRAFT REPAIR/REVISION;  Surgeon: Kirt Mclaughlin MD;  Location: [facility]
IN OR;  Service: Vascular;  Laterality: N/A;  Superficialization and revision of left arm fi
stula 
 
   CARDIAC CATHETERIZATION  2017 
   CARPAL TUNNEL RELEASE Bilateral other 
  abstracted 
   CATARACT EXTRACTION Bilateral 2007 
   CATHETER REMOVAL N/A 2016 
  Procedure: DIALYSIS CATHETER - REMOVAL;  Surgeon: Kirt Mclaughlin MD;  Location: [facility] OR; 
 Service: Vascular;  Laterality: N/A;  PD cath removal 
   CHOLECYSTECTOMY  other 
  abstracted 
   COLONOSCOPY   
   CORONARY ARTERY BYPASS GRAFT   
   CORONARY STENT PLACEMENT  2017 
  Drug Elutin stents to OM, 1 stent to prox. LAD 
   EYE SURGERY   
   FOOT AMPUTATION Left 2018 
  Procedure: FOREFOOT - AMPUTATION;  Surgeon: Srinivasan Jordan DPM;  Location: [facility] MAIN OR;
  Service: Podiatry;  Laterality: Left; 
   HARDWARE PRESENT   
  stent placements x 3, Left hip replacement, vascular stents 2 in left leg 
   HIP ARTHROPLASTY Left 2016 
  Procedure: HIP - ARTHROPLASTY(ALLISON);  Surgeon: Uriel Roa MD;  Location: [facility] MAIN 
OR;  Service: Orthopedics;  Laterality: Left; 
   HYSTERECTOMY  1998 
  complete 
   PACEMAKER INSERTION   
  [employer] pacemaker/defib 
   PERITONEAL CATHETER INSERTION  8/15/2013 
   PERITONEAL CATHETER INSERTION N/A 2016 
  Procedure: LAPAROSCOPIC - PERITONEAL DIALYSIS CATH INSERTION;  Surgeon: Kirt Mclaughlin MD;  Lo
cation: [facility] MAIN OR;  Service: Vascular;  Laterality: N/A;  Removal of old catheter 
   SHOULDER SURGERY Right  
  frozen shoulder 
   UNLISTED PROCEDURE ARTHROSCOPY   
  total Left hip 
   vascular stents Left 2017 
  leg (2 stents) 
   VASCULAR SURGERY   
 
 Social History 
 Substance Use Topics 
   Smoking status: Former Smoker 
   Packs/day: 1.00 
   Years: 30.00 
   Types: Cigarettes 
   Quit date: 2004 
   Smokeless tobacco: Never Used 
    Comment: quite smoking  
   Alcohol use No 
 
 Family History 
 Problem Relation Age of Onset 
   High cholesterol Father  
   Hypertension Father  
   Diabetes Paternal Grandmother  
   Malig hypertherm Neg Hx  
   Kidney disease Neg Hx  
 
 Current Outpatient Prescriptions on File Prior to Visit 
 Medication Sig Dispense Refill 
   albuterol (PROVENTIL HFA;VENTOLIN HFA) 108 (90 Base) MCG/ACT inhaler Inhale 2 puffs int
 
o the lungs every 4 (four) hours as needed for Wheezing.   
   albuterol (VENTOLIN HFA) 108 (90 Base) MCG/ACT inhaler Ventolin HFA 90 mcg/actuation ae
rosol inhaler
  Inhale 2 puffs every 4 hours by inhalation route as needed.   
   apixaban (ELIQUIS) 2.5 MG tablet Take 1 tablet by mouth 2 (two) times daily. 60 tablet 
0 
   atorvastatin (LIPITOR) 40 MG tablet Take 1 tablet by mouth nightly. 30 tablet 0 
   bisacodyl (DULCOLAX) 10 MG suppository Place 10 mg rectally.   
   calcium acetate (PHOSLO) 667 MG capsule 667 mg 3 (three) times daily with meals.   
   carvedilol (COREG) 12.5 MG tablet Take 1 tablet by mouth 2 (two) times daily with meals
. 60 tablet 0 
   cefTAZidime (FORTAZ) 2 g injection Inject 2 g into the muscle After Hemodialysis (see a
dmin instructions) for 7 days. 1 each  
   Cholecalciferol (VITAMIN D3) 5000 UNITS CAPS Take 5,000 capsules by mouth every other d
ay.   
   clopidogrel (PLAVIX) 75 MG tablet Take 1 tablet by mouth daily. 30 tablet 11 
   esomeprazole (NEXIUM) 20 MG capsule NEXIUM(ESOMEPRAZOLE MAGNESIUM) 20 MG CAPSULE.DR
 Dose: 1 CAP   
   HYDROcodone-acetaminophen (NORCO) 5-325 MG per tablet Take 1 tablet by mouth every 4 (f
our) hours as needed. 30 tablet 0 
   insulin aspart (NOVOLOG) 100 UNIT/ML injection Inject  into the skin 3 (three) times da
melina before meals.   
   insulin degludec (TRESIBA) 100 UNIT/ML injection Tresiba Flextouch U-100(INSULIN DEGLUD
EC) 100 UNIT/1 ML INSULN.PEN
 Dose: 20 UNIT at night   
   isosorbide mononitrate (IMDUR) 60 MG 24 hr tablet Take 1 tablet by mouth daily. 30 tabl
et 1 
   loperamide (IMODIUM) 2 MG capsule 2 mg as needed.   
   LORazepam (ATIVAN) 1 MG tablet Take 1 tablet by mouth every 8 (eight) hours as needed f
or Anxiety. 30 tablet 0 
   Magnesium Cl-Calcium Carbonate (SLOW-MAG) 71.5-119 MG TBEC Take 1 tablet by mouth every
 other day.   
   nitroGLYCERIN (NITROSTAT) 0.4 MG SL tablet Place 1 tablet under the tongue every 5 (fiv
e) minutes as needed for Chest pain. 30 tablet 0 
   nortriptyline (PAMELOR) 25 MG capsule Take 25 mg by mouth 3 (three) times daily. Takes 
25 mg in am , and 75 mg at night   
   ondansetron (ZOFRAN-ODT) 4 MG disintegrating tablet Take 4 mg by mouth as needed.   
   oxybutynin (DITROPAN) 5 MG tablet Take 2.5 mg by mouth 2 (two) times daily.   
   prochlorperazine 5 MG tablet TAKE ONE TABLET BY MOUTH TWICE DAILY AS NEEDED FOR NAUSEA 
60 tablet 11 
   ranitidine (ZANTAC) 150 MG tablet ranitidine 150 mg tablet
  Take 1 tablet twice a day by oral route.   
   rOPINIRole (REQUIP) 0.25 MG tablet Take 0.75 mg by mouth nightly.   
   vancomycin (VANCOCIN) IVPB Inject 750 mg into the vein After Hemodialysis (see admin in
structions) for 7 days. Dilute in appropriate volume of IV fluid and give by slow IV infusio
n.  0 
   Vortioxetine HBr 10 MG TABS 10 mg nightly.   
 
 No current facility-administered medications on file prior to visit.  
 
 Allergies 
 Allergen Reactions 
   Quinapril Hcl Anaphylaxis 
   Digoxin And Related Other (See Comments) 
   toxic 
   Metaxalone Other (See Comments) 
   Morphine Mental Changes 
   Make her crazy/ "funny feeling in my head"
 Has used hydromorphone in-house 
   Pantoprazole Sodium Nausea and Vomiting 
 
   Penicillins Rash 
   Quinapril Hcl Edema 
   Facial Edema 
   Sudafed [Pseudoephedrine Hcl] Rash 
 
 Comprehensive ROS performed and pertinent items described in the HPI. 
 
 Vitals:reviewed
 CONSTITUTIONAL:  Conversant, well developed, NAD
 EYES: Anicteric sclerae, no lid drag, no proptosis
 RESP: Normal effort, regular, even, unlabored rate
 CV: No peripheral edema, rate regular
 SKIN: Pink, warm, dry without rash/lesion
 MS: ROM not limited, no digital cyanosis, normal gait
 NEURO: Cranial nerves II-XII grossly intact, A&O times 3
 PSYCH: appropriate affect, speech and tone, judgement and insight intact
 Vascular: strong biphasic signals in right foot.  
 
 Discussed ultrasound results from 19 with the patient, which show mildly decreased blo
od flow in her right leg. The patient's nonhealing right foot ulcerations are likely due to 
infection and not due to vascular issues. Discussed with the patient that the symptoms in he
r hands are due to vascular access steal syndrome. Recommended against PD catheter due to kurtis rodriguez's past issues concerning adhesions and recommended that the patient stay with HD as lo
ng as she is still tolerating it well. Discussed with the patient that the ulceration in her
 right foot still shows signs of infection and patient was advised to monitor it closely to 
prevent amputation.  If there is still poor healing after infection control, we may consider
 repeating arteriogram at that time. All questions and concerns addressed. Patient will foll
ow up in 1 month. Patient understands and is agreeable. 
 
 Kirt Mclaughlin MD
 
 Attending Note: Documentation assistance provided by Tito Clark (David). Information tammy
rded by the [email or web address] has been reviewed and validated by me. I agree with its contents.
 
 Signed by: David Combs 19, 1:38 PM
 in this encounter
 
 Plan of Treatment
 
 
+--------+---------+-----------+----------------------+-------------+
| Date   | Type    | Specialty | Care Team            | Description |
+--------+---------+-----------+----------------------+-------------+
| 04/10/ | Office  | Podiatry  |   Srinivasan Jordan,  |             |
|    | Visit   |           | [facility]  [address] [facility] |             |
|        |         |           |  [address]  [place],  |             |
|        |         |           | [place] [address]             |             |
|        |         |           | [phone]         |             |
|        |         |           | [phone] (Fax)   |             |
+--------+---------+-----------+----------------------+-------------+
 as of this encounter
 
 Visit Diagnoses
 
 
+----------------------------------------------------+
| Diagnosis                                          |
+----------------------------------------------------+
|   PAD (peripheral artery disease) (HCC) - Primary  |
+----------------------------------------------------+
 
|   Unspecified disorders of arteries and arterioles |
+----------------------------------------------------+
|   ESRD (end stage renal disease) (HCC)             |
+----------------------------------------------------+
|   End stage renal disease                          |
+----------------------------------------------------+

## 2019-04-09 NOTE — XMS
Encounter Summary
  Created on: 2019
 
 Cherie Villalobos
 External Reference #: [id number]
 : 48
 Sex: Female
 
 Demographics
 
 
+-----------------------+--------------------------+
| Address               | [address]               |
|                       | [place]  [address] |
+-----------------------+--------------------------+
| Home Phone            | [phone]          |
+-----------------------+--------------------------+
| Preferred Language    | Unknown                  |
+-----------------------+--------------------------+
| Marital Status        |                   |
+-----------------------+--------------------------+
| Rastafarian Affiliation | 1013                     |
+-----------------------+--------------------------+
| Race                  | Unknown                  |
+-----------------------+--------------------------+
| Ethnic Group          | Unknown                  |
+-----------------------+--------------------------+
 
 
 Author
 
 
+--------------+-----------------------+
| Author       | [employer] |
+--------------+-----------------------+
| Organization | [facility] |
+--------------+-----------------------+
| Address      | Unknown               |
+--------------+-----------------------+
| Phone        | Unavailable           |
+--------------+-----------------------+
 
 
 
 Support
 
 
+---------------+--------------+---------------------+-----------------+
| Name          | Relationship | Address             | Phone           |
+---------------+--------------+---------------------+-----------------+
| Anoop Villalobos | ECON         | [place], | [phone] |
|               |              |  [place]  [address]     |                 |
+---------------+--------------+---------------------+-----------------+
| Jennifer Dickson | ECON         | [place]       | [phone] |
|               |              | [address]               |                 |
+---------------+--------------+---------------------+-----------------+
 
 
 
 
 Care Team Providers
 
 
+-----------------------+------+-----------------+
| Care Team Member Name | Role | Phone           |
+-----------------------+------+-----------------+
| Ivy Couch DO      | PCP  | [phone] |
+-----------------------+------+-----------------+
 
 
 
 Encounter Details
 
 
+--------+------------+----------------------+-----------+-------------+
| Date   | Type       | Department           | Care Team | Description |
+--------+------------+----------------------+-----------+-------------+
| / | Procedure  |   [facility]    |           |             |
| 2019   | Pass       | [facility] [address]   |           |             |
|        |            | [address] |           |             |
|        |            |   [address]     |           |             |
|        |            | [place] [address]   |           |             |
|        |            | [phone]         |           |             |
+--------+------------+----------------------+-----------+-------------+
 
 
 
 Social History
 
 
+---------------+------------+-----------+--------+------------------+
| Tobacco Use   | Types      | Packs/Day | Years  | Date             |
|               |            |           | Used   |                  |
+---------------+------------+-----------+--------+------------------+
| Former Smoker | Cigarettes | 1         | 30     | Quit: 2004 |
+---------------+------------+-----------+--------+------------------+
 
 
 
+---------------------+---+---+---+
| Smokeless Tobacco:  |   |   |   |
| Never Used          |   |   |   |
+---------------------+---+---+---+
 
 
 
+------------------------------+
| Comments: quite smoking  |
+------------------------------+
 
 
 
+-------------+-----------+---------+----------+
| Alcohol Use | Drinks/We | oz/Week | Comments |
|             | ek        |         |          |
+-------------+-----------+---------+----------+
| No          |           |         |          |
+-------------+-----------+---------+----------+
 
 
 
 
+------------------+---------------+
| Sex Assigned at  | Date Recorded |
| Birth            |               |
+------------------+---------------+
| Not on file      |               |
+------------------+---------------+
 as of this encounter
 
 Plan of Treatment
 
 
+--------+---------+-----------+----------------------+-------------+
| Date   | Type    | Specialty | Care Team            | Description |
+--------+---------+-----------+----------------------+-------------+
| 04/10/ | Office  | Podiatry  |   Srinivasan Jordan,  |             |
| 2019   | Visit   |           | DPM  [address], |             |
|        |         |           |  [place],  |             |
|        |         |           | [place] [address]             |             |
|        |         |           | [phone]         |             |
|        |         |           | [phone] (Fax)   |             |
+--------+---------+-----------+----------------------+-------------+
 as of this encounter
 
 Visit Diagnoses
 Not on filein this encounter

## 2019-04-09 NOTE — XMS
Encounter Summary
  Created on: 2019
 
 Cherie Villalobos
 External Reference #: [id number]
 : 48
 Sex: Female
 
 Demographics
 
 
+-----------------------+--------------------------+
| Address               | [address]               |
|                       | [place]  [address] |
+-----------------------+--------------------------+
| Home Phone            | [phone]          |
+-----------------------+--------------------------+
| Preferred Language    | Unknown                  |
+-----------------------+--------------------------+
| Marital Status        |                   |
+-----------------------+--------------------------+
| [employer] Affiliation | [id number]                     |
+-----------------------+--------------------------+
| Race                  | Unknown                  |
+-----------------------+--------------------------+
| Ethnic Group          | Unknown                  |
+-----------------------+--------------------------+
 
 
 Author
 
 
+--------------+-----------------------+
| Author       | [employer] |
+--------------+-----------------------+
| Organization | [facility] |
+--------------+-----------------------+
| Address      | Unknown               |
+--------------+-----------------------+
| Phone        | Unavailable           |
+--------------+-----------------------+
 
 
 
 Support
 
 
+---------------+--------------+---------------------+-----------------+
| Name          | Relationship | Address             | Phone           |
+---------------+--------------+---------------------+-----------------+
| Anoop Villalobos | ECON         | [place], | [phone] |
|               |              |  [place]  [address]     |                 |
+---------------+--------------+---------------------+-----------------+
| Jennifer Dickson | ECON         | [place]       | [phone] |
|               |              | [phone]               |                 |
+---------------+--------------+---------------------+-----------------+
 
 
 
 
 Care Team Providers
 
 
+-----------------------+------+-----------------+
| Care Team Member Name | Role | Phone           |
+-----------------------+------+-----------------+
| Ivy Couch DO      | PCP  | [phone] |
+-----------------------+------+-----------------+
 
 
 
 Reason for Visit
 
 
+-------------------+----------+
| Reason            | Comments |
+-------------------+----------+
| Medication Refill |          |
+-------------------+----------+
 
 
 
 Encounter Details
 
 
+--------+--------+----------------------+----------------------+-------------+
| Date   | Type   | Department           | Care Team            | Description |
+--------+--------+----------------------+----------------------+-------------+
| / | Refill |   [facility]      |   João Asher MD  |             |
|    |        | [address]        |  [address]   |             |
|        |        | [address]   | [address]  [place], [place]    |             |
|        |        | [place] [address]   | [address]  [phone]  |             |
|        |        | [phone]         |  [phone] (Fax)  |             |
+--------+--------+----------------------+----------------------+-------------+
 
 
 
 Social History
 
 
+---------------+------------+-----------+--------+------------------+
| Tobacco Use   | Types      | Packs/Day | Years  | Date             |
|               |            |           | Used   |                  |
+---------------+------------+-----------+--------+------------------+
| Former Smoker | Cigarettes | 1         | 30     | Quit: 2004 |
+---------------+------------+-----------+--------+------------------+
 
 
 
+---------------------+---+---+---+
| Smokeless Tobacco:  |   |   |   |
| Never Used          |   |   |   |
+---------------------+---+---+---+
 
 
 
+------------------------------+
| Comments: quite smoking 2004 |
+------------------------------+
 
 
 
 
+-------------+-----------+---------+----------+
| Alcohol Use | Drinks/We | oz/Week | Comments |
|             | ek        |         |          |
+-------------+-----------+---------+----------+
| No          |           |         |          |
+-------------+-----------+---------+----------+
 
 
 
+------------------+---------------+
| Sex Assigned at  | Date Recorded |
| Birth            |               |
+------------------+---------------+
| Not on file      |               |
+------------------+---------------+
 as of this encounter
 
 Plan of Treatment
 
 
+--------+---------+-----------+----------------------+-------------+
| Date   | Type    | Specialty | Care Team            | Description |
+--------+---------+-----------+----------------------+-------------+
| 04/10/ | Office  | Podiatry  |   Srinivasan Jordan,  |             |
|    | Visit   |           | [facility]  [address], |             |
|        |         |           |  [address]  [place],  |             |
|        |         |           | [place] [address]             |             |
|        |         |           | [phone]         |             |
|        |         |           | [phone] (Fax)   |             |
+--------+---------+-----------+----------------------+-------------+
 as of this encounter
 
 Visit Diagnoses
 Not on filein this encounter

## 2019-04-09 NOTE — XMS
Encounter Summary
  Created on: 2019
 
 Cherie Villalobos
 External Reference #: [id number]
 : 48
 Sex: Female
 
 Demographics
 
 
+-----------------------+--------------------------+
| Address               | [address]               |
|                       | [place]  [address] |
+-----------------------+--------------------------+
| Home Phone            | [phone]          |
+-----------------------+--------------------------+
| Preferred Language    | Unknown                  |
+-----------------------+--------------------------+
| Marital Status        |                   |
+-----------------------+--------------------------+
| Episcopal Affiliation | 1013                     |
+-----------------------+--------------------------+
| Race                  | Unknown                  |
+-----------------------+--------------------------+
| Ethnic Group          | Unknown                  |
+-----------------------+--------------------------+
 
 
 Author
 
 
+--------------+-----------------------+
| Author       | Sherry Medication Review |
+--------------+-----------------------+
| Organization | [facility] |
+--------------+-----------------------+
| Address      | Unknown               |
+--------------+-----------------------+
| Phone        | Unavailable           |
+--------------+-----------------------+
 
 
 
 Support
 
 
+---------------+--------------+---------------------+-----------------+
| Name          | Relationship | Address             | Phone           |
+---------------+--------------+---------------------+-----------------+
| Anoop Villalobos | ECON         | [place], | [phone] |
|               |              |  [place]  [address]     |                 |
+---------------+--------------+---------------------+-----------------+
| Jennifer Dickson | ECON         | [place]       | [phone] |
|               |              | [id number]               |                 |
+---------------+--------------+---------------------+-----------------+
 
 
 
 
 Care Team Providers
 
 
+-----------------------+------+-----------------+
| Care Team Member Name | Role | Phone           |
+-----------------------+------+-----------------+
| Ivy Couch DO      | PCP  | [phone] |
+-----------------------+------+-----------------+
 
 
 
 Reason for Visit
 
 
+---------------------+----------+
| Reason              | Comments |
+---------------------+----------+
| Multiple Falls      |          |
+---------------------+----------+
| Circulatory Problem |          |
+---------------------+----------+
| Referral            |          |
+---------------------+----------+
 
 
 
 Encounter Details
 
 
+--------+-----------+----------------------+----------------------+-------------+
| Date   | Type      | Department           | Care Team            | Description |
+--------+-----------+----------------------+----------------------+-------------+
| / | Emergency |   [facility]    |   Chau Deleon,  |             |
|    |           | [facility]       | MD [address]   |             |
|        |           | Emergency Department | EMERGENCY DEPARTMENT |             |
|        |           |   [address]     |   [place] [address] |             |
|        |           | [place] [address]   |   [phone]       |             |
|        |           | [phone]         | [phone] (Fax)   |             |
+--------+-----------+----------------------+----------------------+-------------+
 
 
 
 Social History
 
 
+---------------+------------+-----------+--------+------------------+
| Tobacco Use   | Types      | Packs/Day | Years  | Date             |
|               |            |           | Used   |                  |
+---------------+------------+-----------+--------+------------------+
| Former Smoker | Cigarettes | 1         | 30     | Quit: 2004 |
+---------------+------------+-----------+--------+------------------+
 
 
 
+---------------------+---+---+---+
| Smokeless Tobacco:  |   |   |   |
| Never Used          |   |   |   |
+---------------------+---+---+---+
 
 
 
 
+------------------------------+
| Comments: quite smoking  |
+------------------------------+
 
 
 
+-------------+-----------+---------+----------+
| Alcohol Use | Drinks/We | oz/Week | Comments |
|             | ek        |         |          |
+-------------+-----------+---------+----------+
| No          |           |         |          |
+-------------+-----------+---------+----------+
 
 
 
+------------------+---------------+
| Sex Assigned at  | Date Recorded |
| Birth            |               |
+------------------+---------------+
| Not on file      |               |
+------------------+---------------+
 as of this encounter
 
 Last Filed Vital Signs
 
 
+-------------------+----------------------+-------------------------+
| Vital Sign        | Reading              | Time Taken              |
+-------------------+----------------------+-------------------------+
| Blood Pressure    | 137/63               | 2019  4:05 PM PST |
+-------------------+----------------------+-------------------------+
| Pulse             | 87                   | 2019  4:05 PM PST |
+-------------------+----------------------+-------------------------+
| Temperature       | 36.3   C (97.3   F)  | 2019  4:05 PM PST |
+-------------------+----------------------+-------------------------+
| Respiratory Rate  | 16                   | 2019  4:05 PM PST |
+-------------------+----------------------+-------------------------+
| Oxygen Saturation | -                    | -                       |
+-------------------+----------------------+-------------------------+
| Inhaled Oxygen    | -                    | -                       |
| Concentration     |                      |                         |
+-------------------+----------------------+-------------------------+
| Weight            | 65.6 kg (144 lb 11.7 | 2019  4:05 PM PST |
|                   |  oz)                 |                         |
+-------------------+----------------------+-------------------------+
| Height            | -                    | -                       |
+-------------------+----------------------+-------------------------+
| Body Mass Index   | 20.77                | 2019  4:05 PM PST |
+-------------------+----------------------+-------------------------+
 in this encounter
 
 Medications at Time of Discharge
 
 
+----------------------+----------------------+--------+---------+----------+-----------+
| Medication           | Sig.                 | Disp.  | Refills | Start    | End Date  |
|                      |                      |        |         | Date     |           |
+----------------------+----------------------+--------+---------+----------+-----------+
 
|   albuterol          | Inhale 2 puffs into  |        |         |          |           |
| (PROVENTIL           | the lungs every 4    |        |         |          |           |
| HFA;VENTOLIN HFA)    | (four) hours as      |        |         |          |           |
| 108 (90 Base)        | needed for Wheezing. |        |         |          |           |
| MCG/ACT              |                      |        |         |          |           |
| inhalerIndications:  |                      |        |         |          |           |
| states uses 2x       |                      |        |         |          |           |
| monthly average      |                      |        |         |          |           |
+----------------------+----------------------+--------+---------+----------+-----------+
|   apixaban (ELIQUIS) | Take 1 tablet by     |   60   | 0       | 20 |           |
|  2.5 MG tablet       | mouth 2 (two) times  | tablet |         | 18       |           |
|                      | daily.               |        |         |          |           |
+----------------------+----------------------+--------+---------+----------+-----------+
|   atorvastatin       | Take 80 mg by mouth  |        |         | 20 |           |
| (LIPITOR) 80 MG      | nightly.             |        |         | 19       |           |
| tablet               |                      |        |         |          |           |
+----------------------+----------------------+--------+---------+----------+-----------+
|   carvedilol (COREG) | Take 1 tablet by     |   60   | 0       | 20 |  |
|  12.5 MG tablet      | mouth 2 (two) times  | tablet |         | 19       | 0         |
|                      | daily with meals.    |        |         |          |           |
+----------------------+----------------------+--------+---------+----------+-----------+
|   esomeprazole       | Take 1 capsule by    |        |         |          |           |
| (NEXIUM) 40 MG       | mouth every day      |        |         |          |           |
| capsule              |                      |        |         |          |           |
+----------------------+----------------------+--------+---------+----------+-----------+
|                      | Take 1 tablet by     |   30   | 0       | 20 |           |
| HYDROcodone-acetamin | mouth every 4 (four) | tablet |         | 19       |           |
| ophen (NORCO) 5-325  |  hours as needed.    |        |         |          |           |
| MG per tablet        |                      |        |         |          |           |
+----------------------+----------------------+--------+---------+----------+-----------+
|   insulin aspart     | Inject  into the     |        |         |          |           |
| (NOVOLOG) 100        | skin 3 (three) times |        |         |          |           |
| UNIT/ML injection    |  daily before meals. |        |         |          |           |
|                      |  Sliding scale       |        |         |          |           |
+----------------------+----------------------+--------+---------+----------+-----------+
|   insulin degludec   | Inject 21 units      |        |         |          |           |
| (TRESIBA) 100        | every night          |        |         |          |           |
| UNIT/ML injection    |                      |        |         |          |           |
+----------------------+----------------------+--------+---------+----------+-----------+
|   isosorbide         | Take 1 tablet by     |   30   | 1       | 20 |  |
| mononitrate (IMDUR)  | mouth daily.         | tablet |         | 18       | 9         |
| 60 MG 24 hr tablet   |                      |        |         |          |           |
+----------------------+----------------------+--------+---------+----------+-----------+
|   losartan (COZAAR)  | Take 100 mg by mouth |        |         | 20 |           |
| 100 MG tablet        |  daily.              |        |         | 19       |           |
+----------------------+----------------------+--------+---------+----------+-----------+
|   nitroGLYCERIN      | Place 1 tablet under |   30   | 0       | 20 |  |
| (NITROSTAT) 0.4 MG   |  the tongue every 5  | tablet |         | 19       | 0         |
| SL tablet            | (five) minutes as    |        |         |          |           |
|                      | needed for Chest     |        |         |          |           |
|                      | pain.                |        |         |          |           |
+----------------------+----------------------+--------+---------+----------+-----------+
|   nortriptyline      | Take 25-75 mg by     |        |         |          |           |
| (PAMELOR) 25 MG      | mouth See Admin      |        |         |          |           |
| capsule              | Instructions. Takes  |        |         |          |           |
|                      | 25 mg by mouth every |        |         |          |           |
|                      |  morning and 75 mg   |        |         |          |           |
|                      | every night          |        |         |          |           |
+----------------------+----------------------+--------+---------+----------+-----------+
|   ondansetron        | Take 4 mg by mouth   |        |         | 20 |           |
 
| (ZOFRAN-ODT) 4 MG    | every 8 (eight)      |        |         | 18       |           |
| disintegrating       | hours as needed.     |        |         |          |           |
| tablet               |                      |        |         |          |           |
+----------------------+----------------------+--------+---------+----------+-----------+
|   oxybutynin         | Take 7.5 mg by mouth |        |         |          |           |
| (DITROPAN) 5 MG      |  2 (two) times       |        |         |          |           |
| tablet               | daily.               |        |         |          |           |
+----------------------+----------------------+--------+---------+----------+-----------+
|   prochlorperazine 5 | TAKE ONE TABLET BY   |   60   | 11      | 20 |           |
|  MG tablet           | MOUTH TWICE DAILY AS | tablet |         | 19       |           |
|                      |  NEEDED FOR NAUSEA   |        |         |          |           |
+----------------------+----------------------+--------+---------+----------+-----------+
|   rOPINIRole         | Take 0.75 mg by      |        |         |          |           |
| (REQUIP) 0.25 MG     | mouth nightly.       |        |         |          |           |
| tablet               |                      |        |         |          |           |
+----------------------+----------------------+--------+---------+----------+-----------+
|   TRINTELLIX 20 MG   | Take 20 mg by mouth  |        |         | 20 |           |
| TABS                 | every evening.       |        |         | 19       |           |
+----------------------+----------------------+--------+---------+----------+-----------+
|   albuterol          | Ventolin HFA 90      |        |         |          |  |
| (VENTOLIN HFA) 108   | mcg/actuation        |        |         |          | 9         |
| (90 Base) MCG/ACT    | aerosol inhaler      |        |         |          |           |
| inhaler              | Inhale 2 puffs every |        |         |          |           |
|                      |  4 hours by          |        |         |          |           |
|                      | inhalation route as  |        |         |          |           |
|                      | needed.              |        |         |          |           |
+----------------------+----------------------+--------+---------+----------+-----------+
|   atorvastatin       | Take 1 tablet by     |   30   | 0       | 20 |  |
| (LIPITOR) 40 MG      | mouth nightly.       | tablet |         | 19       | 9         |
| tablet               |                      |        |         |          |           |
+----------------------+----------------------+--------+---------+----------+-----------+
|   bisacodyl          | Place 10 mg          |        |         |          |  |
| (DULCOLAX) 10 MG     | rectally.            |        |         |          | 9         |
| suppository          |                      |        |         |          |           |
+----------------------+----------------------+--------+---------+----------+-----------+
|   calcium acetate    | 667 mg 3 (three)     |        |         | 20 |  |
| (PHOSLO) 667 MG      | times daily with     |        |         | 16       | 9         |
| capsule              | meals.               |        |         |          |           |
+----------------------+----------------------+--------+---------+----------+-----------+
|   Cholecalciferol    | Take 5,000 capsules  |        |         |          |  |
| (VITAMIN D3) 5000    | by mouth every other |        |         |          | 9         |
| UNITS CAPS           |  day.                |        |         |          |           |
+----------------------+----------------------+--------+---------+----------+-----------+
|   clopidogrel        | Take 1 tablet by     |   30   | 11      | 20 |  |
| (PLAVIX) 75 MG       | mouth daily.         | tablet |         | 18       | 9         |
| tablet               |                      |        |         |          |           |
+----------------------+----------------------+--------+---------+----------+-----------+
|   loperamide         | 2 mg as needed.      |        |         |          |  |
| (IMODIUM) 2 MG       |                      |        |         |          | 9         |
| capsule              |                      |        |         |          |           |
+----------------------+----------------------+--------+---------+----------+-----------+
|   LORazepam (ATIVAN) | Take 1 tablet by     |   30   | 0       | 20 | 02/15/201 |
|  1 MG tablet         | mouth every 8        | tablet |         | 19       | 9         |
|                      | (eight) hours as     |        |         |          |           |
|                      | needed for Anxiety.  |        |         |          |           |
+----------------------+----------------------+--------+---------+----------+-----------+
|   Magnesium          | Take 1 tablet by     |        |         |          |  |
| Cl-Calcium Carbonate | mouth every other    |        |         |          | 9         |
|  (SLOW-MAG) 71.5-119 | day.                 |        |         |          |           |
|  MG TBEC             |                      |        |         |          |           |
 
+----------------------+----------------------+--------+---------+----------+-----------+
|   ranitidine         | ranitidine 150 mg    |        |         |          |  |
| (ZANTAC) 150 MG      | tablet Take 1 tablet |        |         |          | 9         |
| tablet               |  twice a day by oral |        |         |          |           |
|                      |  route.              |        |         |          |           |
+----------------------+----------------------+--------+---------+----------+-----------+
|   Vortioxetine HBr   | 10 mg nightly.       |        |         |          |  |
| 10 MG TABS           |                      |        |         |          | 9         |
+----------------------+----------------------+--------+---------+----------+-----------+
 as of this encounter
 
 Plan of Treatment
 
 
+--------+---------+-----------+----------------------+-------------+
| Date   | Type    | Specialty | Care Team            | Description |
+--------+---------+-----------+----------------------+-------------+
| 04/10/ | Office  | Podiatry  |   Srinivasan Jordan,  |             |
| 2019   | Visit   |           | DPM  [address] [facility] [address] [facility], |             |
|        |         |           |  [address]  [place],  |             |
|        |         |           | [place] [address]             |             |
|        |         |           | [phone]         |             |
|        |         |           | [phone] (Fax)   |             |
+--------+---------+-----------+----------------------+-------------+
 as of this encounter
 
 Procedures
 
 
+-----------------+--------+-------------+----------------------+----------------------+
| Procedure Name  | Priori | Date/Time   | Associated Diagnosis | Comments             |
|                 | ty     |             |                      |                      |
+-----------------+--------+-------------+----------------------+----------------------+
| ED INFORMATION  | Routin | 2019  |                      |   Results for this   |
| EXCHANGE        | e      |  3:58 PM    |                      | procedure are in the |
|                 |        | PST         |                      |  results section.    |
+-----------------+--------+-------------+----------------------+----------------------+
 in this encounter
 
 Results
 ED INFORMATION EXCHANGE (2019  3:58 PM)
 
+------------------------------------------------------------------------+--------------+
| Narrative                                                              | Performed At |
+------------------------------------------------------------------------+--------------+
|   [id number]     Criteria Met         Care        |   ED         |
| Guidelines      10 in      Security and Safety  No recent Security   | INFORMATION  |
| Events currently on file     ED Care Guidelines from [facility] -        | EXCHANGE     |
| [place]  Last Updated: 18 10:16 AM         Other Information:    |              |
| Currently being seen by [facility] in [place] for mental health        |              |
| [phone]  These are guidelines and the provider should    |              |
| exercise clinical judgment when providing care.  ED Care Guidelines    |              |
| from [facility]  Last Updated: 17 10:44 AM         Care |              |
|  Coordination:  This patient has been identified as having at          |              |
| leastEmergency Departments visits in the 12 months immediately         |              |
| preceding the date these guidelines were entered.Patient requires      |              |
| education on appropriate ED usage.Emphasize the importance of using    |              |
| outpatient   medical services for the treatment of chronic conditions. |              |
|   Please contact Community Health WorkerWillard at [phone] if   |              |
| patient is seen in ED.  These are guidelines and the provider should   |              |
 
| exercise clinical judgment when providing care.  These are guidelines  |              |
| and the provider should exercise clinical judgment when providing      |              |
| care.           Prescription Drug Report (12 Mo.)  PDMP query found no |              |
|  report.        E.D. Visit Count (12 mo.)  Facility Visits Low Acuity  |              |
|   [facility] 18 0   [facility] 2 0   [facility]   |              |
| [facility] 4 0   Total 24 0   Note: Visits indicate total |              |
|  known visits. Medicaid Low Acuity Dx are the number of primary        |              |
| diagnoses on the Medicaid's Low Acuity dx list.         Recent         |              |
| Emergency Department Visit Summary  Showing 10 most recent visits out  |              |
| of 24 in the past 12 months  Date [facility] State Type Diagnoses   |              |
| or Chief Complaint   2019 [facility] [place]       |              |
| Emergency          Multiple Falls        Referral        Circulatory   |              |
| Problem      2019 [facility] [place]. [place] Emergency      |              |
|      ABD PAIN        Other chest pain      2019 [facility]         |              |
| [facility] [place] Emergency          Foot Pain      [facility]              [facility] [place] [place] [facility]          Chest Pain          |              |
| Nausea        Shortness of Breath        Chest pain, unspecified       |              |
|      Elevated blood-pressure reading, without diagnosis of             |              |
| hypertension        Presence of aortocoronary bypass graft             |              |
| Other specified postprocedural states      2019 [facility]  |              |
| [facility] [place] Emergency          CHEST PAIN        Abnormal levels  |              |
| of other serum enzymes        Personal history of other diseases of    |              |
| the circulatory system        Chest pain, unspecified      2019 |              |
|  [facility] [place] Emergency          FISTULA BLEEDING    |              |
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
| grafts, initial encounter      Dec 22, 2018 [facility]       |              |
| [place] Emergency          CHEST PAIN        Type 2 diabetes         |              |
| mellitus with hyperglycemia        Chest pain, unspecified      Nov    |              |
| 2018 [facility] [place] Emergency    Chief Complaint:   |              |
| SOB      2018 [facility] [place]. [place] Emergency         |              |
|     FALL        Unspecified fall, initial encounter        Dependence  |              |
| on renal dialysis        End stage renal disease      2018     |              |
| [facility] [place]. [place] Emergency          FALL                 |              |
| Essential (primary) hypertension        Type 2 diabetes mellitus with  |              |
| hyperglycemia        Type 2 diabetes mellitus with unspecified         |              |
| complications        Unspecified fall, initial encounter               |              |
| Headache            Recent Inpatient Visit Summary  Showing 10 most    |              |
| recent visits out of 11 in the past 12 months  Date [facility]      |              |
| State Type Diagnoses or Chief Complaint   2019 [facility] |              |
|  [place] Vascular Surgery          Foot Pain        End stage   |              |
| renal disease        Dependence on renal dialysis        Peripheral    |              |
| vascular disease, unspecified        Anemia in chronic kidney disease  |              |
|        Other disorders of plasma-protein metabolism, not elsewhere     |              |
| classified        Other specified personal risk factors, not elsewhere |              |
|  classified      [facility] [place]           |              |
| Recovery          Peripheral arterial disease, osteomyelitis left foot |              |
|         Other acute osteomyelitis, left ankle and foot        Long     |              |
| term (current) use of antibiotics        End stage renal disease       |              |
|      Anemia in chronic kidney disease      Dec 5, 2018 [facility] |              |
|  [facility] [place] [place] [facility]          Peripheral vascular disease, |              |
|  unspecified        End stage renal disease        Dependence on renal |              |
|  dialysis        Long term (current) use of insulin        Other       |              |
| chronic osteomyelitis, left ankle and foot        Type 2 diabetes      |              |
| mellitus with diabetic chronic kidney disease        Essential         |              |
| (primary) hypertension      [facility] [place] [facility]          Upper GIB        Other chronic osteomyelitis,    |              |
| unspecified ankle and foot        End stage renal disease        Other |              |
|  specified personal risk factors, not elsewhere classified             |              |
| Chronic systolic (congestive) heart failure        Anemia in chronic   |              |
| kidney disease        Other disorders of plasma-protein metabolism,    |              |
 
| not elsewhere classified        Moderate protein-calorie malnutrition  |              |
|        Other disorders of phosphorus metabolism      2018      |              |
| [place]. [place] Medical Surgical          1. Type 2      |              |
| diabetes mellitus with other specified complication        2. Urinary  |              |
| tract infection, site not specified        3. Unspecified              |              |
| protein-calorie malnutrition        4. Other chronic osteomyelitis,    |              |
| unspecified site        5. Hypertensive heart and chronic kidney       |              |
| disease with heart failure and with stage 5 chronic kidney disease, or |              |
|  end stage renal disease        6. Chronic diastolic (congestive)      |              |
| heart failure        7. Other osteomyelitis, ankle and foot        8.  |              |
| Type 2 diabetes mellitus with foot ulcer        9. Atherosclerotic     |              |
| heart disease of native coronary artery without angina pectoris        |              |
|  10. Chronic obstructive pulmonary disease, unspecified      ,    |              |
|  [facility] [place] [facility]          1. Benign |              |
|  paroxysmal vertigo, unspecified ear        2. End stage renal disease |              |
|         3. Hypertensive chronic kidney disease with stage 5 chronic    |              |
| kidney disease or end stage renal disease        4. Urinary tract      |              |
| infection, site not specified        5. Hypertensive heart and chronic |              |
|  kidney disease with heart failure and with stage 5 chronic kidney     |              |
| disease, or end stage renal disease        6. Kidney transplant status |              |
|         7. Unspecified systolic (congestive) heart failure        8.   |              |
| Syncope and collapse        9. Type 2 diabetes mellitus with diabetic  |              |
| chronic kidney disease        10. Atherosclerotic heart disease of     |              |
| native coronary artery without angina pectoris      [facility] 2018       |              |
[facility] [place] [facility]          Acute     |              |
| ischemic heart disease, unspecified        Long term (current) use of  |              |
| insulin        Dependence on renal dialysis        End stage renal     |              |
| disease        Type 2 diabetes mellitus with diabetic chronic kidney   |              |
| disease        Essential (primary) hypertension        Anemia in       |              |
| chronic kidney disease      2018 [facility] |              |
|  Medical Surgical          0. Cough        1. Pneumonia due to         |              |
| Pseudomonas        2. End stage renal disease        3. Hypertensive   |              |
| heart and chronic kidney disease with heart failure and with stage 5   |              |
| chronic kidney disease, or end stage renal disease        4. Cachexia  |              |
|        5. Chronic obstructive pulmonary disease with acute lower       |              |
| respiratory infection        6. Type 2 diabetes mellitus with diabetic |              |
|  chronic kidney disease        7. Heart failure, unspecified        8. |              |
|  Hyperlipidemia, unspecified        9. Gastro-esophageal reflux        |              |
| disease without esophagitis      2018 [facility]       |              |
| [facility] Medical Surgical          0. Other chest pain        1.      |              |
| Gastro-esophageal reflux disease without esophagitis        2. End     |              |
| stage renal disease        3. Hypertensive heart and chronic kidney    |              |
| disease with heart failure and with stage 5 chronic kidney disease, or |              |
|  end stage renal disease        4. Chronic systolic (congestive) heart |              |
|  failure        5. Atherosclerotic heart disease of native coronary    |              |
| artery with other forms of angina pectoris        6. Type 2 diabetes   |              |
| mellitus with diabetic chronic kidney disease        7.                |              |
| Hyperlipidemia, unspecified        8. Major depressive disorder,       |              |
| single episode, unspecified        9. Chronic obstructive pulmonary    |              |
| disease, unspecified      Mar 6, 2018 [facility]     |              |
| [facility] Cardiology          Heart Failure        Need for iv access  |              |
|        Type 2 diabetes mellitus with other specified complication      |              |
|      Long term (current) use of insulin        Paroxysmal atrial       |              |
| fibrillation        Anemia in chronic kidney disease                   |              |
| Atherosclerotic heart disease of native coronary artery without angina |              |
|  pectoris        Cardiomyopathy, unspecified        End stage renal    |              |
| disease        Other specified postprocedural states            Care   |              |
| Providers  Provider PRC Type Phone Fax Service Dates   HEADINGS, SANTIAGO, |              |
|  F.N.P. Nurse Practitioner: Family     Current      Marco Antonio Martinez     |              |
| /Care Coordinator [phone]    2019 -          |              |
 
| Current      [facility] Martinez [facility] [phone]    2019 |              |
|  - Current      [facility]    [phone])    |              |
| [phone] Current      [facility] Primary     |              |
| Care     Current      MANOLO GONZALEZ Primary Care     Current            |              |
| [facility] Provider [phone]     |              |
| Current      [facility] Case or Care Manager     Current      [facility]   |              |
| MIRTA Sr Case or Care Manager [phone]     |              |
| [phone] Current      Jairo Gutierrez MD Other     Current           |              |
| [employer] [facility]  This patient has registered at the [facility]  |              |
| [facility] Emergency Department   For more information visit:      |              |
| [email or web address] |              |
| [id number]   The above information is provided for the sole purpose of     |              |
| patient treatment. Use of this information beyond the terms of Data    |              |
| Sharing Memorandum of Understanding and License Agreement is           |              |
| prohibited. In certain cases not all visits may be represented.        |              |
| Consult the aforementioned facilities for additional information.      |              |
| 2019 [employer] - [place], [place] -      |              |
| [email or web address]                                         |              |
+------------------------------------------------------------------------+--------------+
 
 
 
+-------------------------------------------------------------------------------------------
--------------------------------------------------------------------------------------------
--------------------+
| Procedure Note                                                                            
                                                                                            
                    |
+-------------------------------------------------------------------------------------------
--------------------------------------------------------------------------------------------
--------------------+
|   Interface, Lab - 2019  4:00 PM PST  Formatting of this note may be different      
                                                                                            
                    |
| from the original.[id number] Met  Care Guidelines  10 in     
                                                                                            
                    |
| 12Security and SafetyNo recent Security Events currently on fileED Care Guidelines from   
                                                                                            
                    |
| Myranda  HollycierraLast Updated: 18 10:16 AM  Other Information:Currently being      
                                                                                            
                    |
| seen by Myranda in [place] for mental health concerns.[phone] are            
                                                                                            
                    |
| guidelines and the provider should exercise clinical judgment when providing care.ED      
                                                                                            
                    |
| Care Guidelines from [facility] Updated: 17 10:44 AM  Care             
                                                                                            
                    |
| Coordination:This patient has been identified as having at leastEmergency Departments     
                                                                                            
                    |
| visits in the 12 months immediately preceding the date these guidelines were              
                                                                                            
                    |
| entered.Patient requires education on appropriate ED usage.Emphasize the importance of    
                                                                                            
 
                    |
| using outpatient medical services for the treatment of chronic conditions.Please contact  
                                                                                            
                    |
|  Community Health WorkerWillard at [phone] if patient is seen in ED.These are      
                                                                                            
                    |
| guidelines and the provider should exercise clinical judgment when providing care.These   
                                                                                            
                    |
| are guidelines and the provider should exercise clinical judgment when providing          
                                                                                            
                    |
| care.Prescription Drug Report (12 Mo.)PDMP query found no report.E.D. Visit Count (12     
                                                                                            
                    |
| mo.)Facility Visits Low Acuity [facility] 18 0 [facility] 2 0    
                                                                                            
                    |
| [facility] 4 0 Total 24 0 Note: Visits indicate total known visits.   
                                                                                            
                    |
| Medicaid Low Acuity Dx are the number of primary diagnoses on the Medicaid's Low Acuity   
                                                                                            
                    |
| dx list.  Recent Emergency Department Visit SummaryShowing 10 most recent visits out of   
                                                                                            
                    |
| 24 in the past 12 monthsDate Facility City State Type Diagnoses or Chief Complaint Feb    
                                                                                            
                    |
| 2019 [facility] [place] [place]. [place] Emergency    Multiple Falls    Referral           
                                                                                            
                    |
| Circulatory Problem  2019 [facility]. [place] Emergency    ABD PAIN     
                                                                                            
                    |
|  Other chest pain  2019 [facility] [place] Emergency    Foot Pain     
                                                                                            
                    |
| 2019 [facility] [place]. [place] Emergency    Chest Pain    Nausea             
                                                                                            
                    |
| Shortness of Breath    Chest pain, unspecified    Elevated blood-pressure reading,        
                                                                                            
                    |
| without diagnosis of hypertension    Presence of aortocoronary bypass graft    Other      
                                                                                            
                    |
| specified postprocedural states  2019 [facility] [place] Emergency    
                                                                                            
                    |
|   CHEST PAIN    Abnormal levels of other serum enzymes    Personal history of other       
                                                                                            
                    |
| diseases of the circulatory system    Chest pain, unspecified  [facility] [place] [place] [facility]    FISTULA BLEEDING    Hemorrhage due to vascular prosthetic  
                                                                                            
 
                    |
|  devices, implants and grafts, initial encounter  Dec [facility], 2018 [facility]       
                                                                                            
                    |
| [place] [place] Emergency    CHEST PAIN    Type 2 diabetes mellitus with hyperglycemia          
                                                                                            
                    |
| Chest pain, unspecified  2018 [facility] [place] [place] [facility]  Chief      
                                                                                            
                    |
| Complaint: SOB  2018 [facility] [place] [place] [facility]    FALL             
                                                                                            
                    |
| Unspecified fall, initial encounter    Dependence on renal dialysis    End stage renal    
                                                                                            
                    |
| disease  2018 [facility] [place] [facility]    FALL    Essential       
                                                                                            
                    |
| (primary) hypertension    Type 2 diabetes mellitus with hyperglycemia    Type 2 diabetes  
                                                                                            
                    |
|  mellitus with unspecified complications    Unspecified fall, initial encounter           
                                                                                            
                    |
| Headache  Recent Inpatient Visit SummaryShowing 10 most recent visits out of 11 in the    
                                                                                            
                    |
| past 12 monthsDate Facility City State Type Diagnoses or Chief Complaint 2019     
                                                                                            
                    |
| [facility] [place] Vascular Surgery    Foot Pain    End stage renal disease   
                                                                                            
                    |
|    Dependence on renal dialysis    Peripheral vascular disease, unspecified    Anemia in  
                                                                                            
                    |
|  chronic kidney disease    Other disorders of plasma-protein metabolism, not elsewhere    
                                                                                            
                    |
| classified    Other specified personal risk factors, not elsewhere classified  Dec 27,    
                                                                                            
                    |
|  [facility] [place] Recovery    Peripheral arterial disease,              
                                                                                            
                    |
| osteomyelitis left foot    Other acute osteomyelitis, left ankle and foot    Long term    
                                                                                            
                    |
| (current) use of antibiotics    End stage renal disease    Anemia in chronic kidney       
                                                                                            
                    |
| disease  Dec 5, 2018 [facility] [place] [facility] Medicine    Peripheral        
                                                                                            
                    |
| vascular disease, unspecified    End stage renal disease    Dependence on renal dialysis  
                                                                                            
                    |
|     Long term (current) use of insulin    Other chronic osteomyelitis, left ankle and     
                                                                                            
 
                    |
| foot    Type 2 diabetes mellitus with diabetic chronic kidney disease    Essential        
                                                                                            
                    |
| (primary) hypertension  2018 [facility] [place] Inpatient    Upper    
                                                                                            
                    |
| GIB    Other chronic osteomyelitis, unspecified ankle and foot    End stage renal         
                                                                                            
                    |
| disease    Other specified personal risk factors, not elsewhere classified    Chronic     
                                                                                            
                    |
| systolic (congestive) heart failure    Anemia in chronic kidney disease    Other          
                                                                                            
                    |
| disorders of plasma-protein metabolism, not elsewhere classified    Moderate              
                                                                                            
                    |
| protein-calorie malnutrition    Other disorders of phosphorus metabolism  2018    
                                                                                            
                    |
| [place]. [place] Medical Surgical    1. Type 2 diabetes mellitus with other  
                                                                                            
                    |
|  specified complication    2. Urinary tract infection, site not specified    3.           
                                                                                            
                    |
| Unspecified protein-calorie malnutrition    4. Other chronic osteomyelitis, unspecified   
                                                                                            
                    |
| site    5. Hypertensive heart and chronic kidney disease with heart failure and with      
                                                                                            
                    |
| stage 5 chronic kidney disease, or end stage renal disease    6. Chronic diastolic        
                                                                                            
                    |
| (congestive) heart failure    7. Other osteomyelitis, ankle and foot    8. Type 2         
                                                                                            
                    |
| diabetes mellitus with foot ulcer    9. Atherosclerotic heart disease of native coronary  
                                                                                            
                    |
|  artery without angina pectoris    10. Chronic obstructive pulmonary disease,             
                                                                                            
                    |
| unspecified  2018 [facility] [place] [facility]    1. Benign      
                                                                                            
                    |
| paroxysmal vertigo, unspecified ear    2. End stage renal disease    3. Hypertensive      
                                                                                            
                    |
| chronic kidney disease with stage 5 chronic kidney disease or end stage renal disease     
                                                                                            
                    |
|  4. Urinary tract infection, site not specified    5. Hypertensive heart and chronic      
                                                                                            
                    |
| kidney disease with heart failure and with stage 5 chronic kidney disease, or end stage   
                                                                                            
 
                    |
| renal disease    6. Kidney transplant status    7. Unspecified systolic (congestive)      
                                                                                            
                    |
| heart failure    8. Syncope and collapse    9. Type 2 diabetes mellitus with diabetic     
                                                                                            
                    |
| chronic kidney disease    10. Atherosclerotic heart disease of native coronary artery     
                                                                                            
                    |
| without angina pectoris  Sep 15, 2018 [facility] JANEEN Vivar. [place] [facility]          
                                                                                            
                    |
| Surgical    Acute ischemic heart disease, unspecified    Long term (current) use of       
                                                                                            
                    |
| insulin    Dependence on renal dialysis    End stage renal disease    Type 2 diabetes     
                                                                                            
                    |
| mellitus with diabetic chronic kidney disease    Essential (primary) hypertension         
                                                                                            
                    |
| Anemia in chronic kidney disease  2018 [facility]. [place] [facility]      
                                                                                            
                    |
| Surgical    0. Cough    1. Pneumonia due to Pseudomonas    2. End stage renal disease     
                                                                                            
                    |
|  3. 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     
                                                                                            
                    |
| chronic kidney disease, or end stage renal disease    4. Cachexia    5. Chronic           
                                                                                            
                    |
| obstructive pulmonary disease with acute lower respiratory infection    6. Type 2         
                                                                                            
                    |
| diabetes mellitus with diabetic chronic kidney disease    7. Heart failure, unspecified   
                                                                                            
                    |
|    8. Hyperlipidemia, unspecified    9. Gastro-esophageal reflux disease without          
                                                                                            
                    |
| esophagitis  2018 [facility] [place] [facility]    0. Other       
                                                                                            
                    |
| chest pain    1. Gastro-esophageal reflux disease without esophagitis    2. End stage     
                                                                                            
                    |
| renal disease    3. Hypertensive heart and chronic kidney disease with heart failure and  
                                                                                            
                    |
|  with stage 5 chronic kidney disease, or end stage renal disease    4. Chronic systolic   
                                                                                            
                    |
| (congestive) heart failure    5. Atherosclerotic heart disease of native coronary artery  
                                                                                            
                    |
|  with other forms of angina pectoris    6. Type 2 diabetes mellitus with diabetic         
                                                                                            
 
                    |
| chronic kidney disease    7. Hyperlipidemia, unspecified    8. Major depressive           
                                                                                            
                    |
| disorder, single episode, unspecified    9. Chronic obstructive pulmonary disease,        
                                                                                            
                    |
| unspecified  Mar 6, 2018 [facility]. [place] [facility]    Heart       
                                                                                            
                    |
| Failure    Need for iv access    Type 2 diabetes mellitus with other specified            
                                                                                            
                    |
| complication    Long term (current) use of insulin    Paroxysmal atrial fibrillation      
                                                                                            
                    |
| Anemia in chronic kidney disease    Atherosclerotic heart disease of native coronary      
                                                                                            
                    |
| artery without angina pectoris    Cardiomyopathy, unspecified    End stage renal disease  
                                                                                            
                    |
|     Other specified postprocedural states  Care ProvidersProvider PRC Type Phone Fax      
                                                                                            
                    |
| Service Dates HEADINGS, SALEEM HENDERSONN.P. Nurse Practitioner: Family   Current  Michelle,      
                                                                                            
                    |
| Marco Antonio /Care Coordinator [phone]  2019 - Current  [facility]  
                                                                                            
                    |
|  Primary Care [phone]  2019 - Current  [facility]        
                                                                                            
                    |
| Primary Care  [phone] Current  [facility] Primary Care   
                                                                                            
                    |
|   Current  [facility] GONZALEZ Primary Care   Current  [facility]      
                                                                                            
                    |
| [phone] Current  [facility] Case or Care Manager   Current  [facility]  
                                                                                            
                    |
|  MIRTA Sr Case or Care Manager [phone] Current      
                                                                                            
                    |
| Jairo Gutierrez MD Other   Current  Collect[facility] PortalThis patient has registered at     
                                                                                            
                    |
| the [facility] For more information visit:       
                                                                                            
                    |
| [email or web address] The above    
                                                                                            
                    |
| information is provided for the sole purpose of patient treatment. Use of this            
                                                                                            
                    |
| information beyond the terms of Data Sharing Memorandum of Understanding and License      
                                                                                            
 
                    |
| Agreement is prohibited. In certain cases not all visits may be represented. Consult the  
                                                                                            
                    |
|  aforementioned facilities for additional information.  [facility]            
                                                                                            
                    |
| [employer]. [facility], [place] - [email or web address]                  
                                                                                            
                    |
|   End stage renal disease                                                                 
                                                                                            
                    |
|   Anemia in chronic kidney disease                                                        
                                                                                            
                    |
|                                                                                           
                                                                                            
                    |
|Dec 5, 2018 [facility] General Medicine                                
                                                                                            
                    |
|  Peripheral vascular disease, unspecified                                                 
                                                                                            
                    |
|   End stage renal disease                                                                 
                                                                                            
                    |
|   Dependence on renal dialysis                                                            
                                                                                            
                    |
|   Long term (current) use of insulin                                                      
                                                                                            
                    |
|   Other chronic osteomyelitis, left ankle and foot                                        
                                                                                            
                    |
|   Type 2 diabetes mellitus with diabetic chronic kidney disease                           
                                                                                            
                    |
|   Essential (primary) hypertension                                                        
                                                                                            
                    |
|                                                                                           
                                                                                            
                    |
|2018 [facility] Inpatient                                      
                                                                                            
                    |
|  Upper GIB                                                                                
                                                                                            
                    |
|   Other chronic osteomyelitis, unspecified ankle and foot                                 
                                                                                            
                    |
|   End stage renal disease                                                                 
                                                                                            
                    |
|   Other specified personal risk factors, not elsewhere classified                         
                                                                                            
 
                    |
|   Chronic systolic (congestive) heart failure                                             
                                                                                            
                    |
|   Anemia in chronic kidney disease                                                        
                                                                                            
                    |
|   Other disorders of plasma-protein metabolism, not elsewhere classified                  
                                                                                            
                    |
|   Moderate protein-calorie malnutrition                                                   
                                                                                            
                    |
|   Other disorders of phosphorus metabolism                                                
                                                                                            
                    |
|                                                                                           
                                                                                            
                    |
|2018 [facility]. [place] [facility]                                
                                                                                            
                    |
|  1. Type 2 diabetes mellitus with other specified complication                            
                                                                                            
                    |
|   2. Urinary tract infection, site not specified                                          
                                                                                            
                    |
|   3. Unspecified protein-calorie malnutrition                                             
                                                                                            
                    |
|   4. Other chronic osteomyelitis, unspecified site                                        
                                                                                            
                    |
|   5. 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
onic kidney disease, or end stage renal disease                                             
                    |
|   6. Chronic diastolic (congestive) heart failure                                         
                                                                                            
                    |
|   7. Other osteomyelitis, ankle and foot                                                  
                                                                                            
                    |
|   8. Type 2 diabetes mellitus with foot ulcer                                             
                                                                                            
                    |
|   9. Atherosclerotic heart disease of native coronary artery without angina pectoris      
                                                                                            
                    |
|   10. Chronic obstructive pulmonary disease, unspecified                                  
                                                                                            
                    |
|                                                                                           
                                                                                            
                    |
|2018 [facility]. [place] [facility]                                 
                                                                                            
                    |
|  1. Benign paroxysmal vertigo, unspecified ear                                            
                                                                                            
 
                    |
|   2. End stage renal disease                                                              
                                                                                            
                    |
|   3. Hypertensive chronic kidney disease with stage 5 chronic kidney disease or end stage 
renal disease                                                                               
                    |
|   4. Urinary tract infection, site not specified                                          
                                                                                            
                    |
|   5. 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
onic kidney disease, or end stage renal disease                                             
                    |
|   6. Kidney transplant status                                                             
                                                                                            
                    |
|   7. Unspecified systolic (congestive) heart failure                                      
                                                                                            
                    |
|   8. Syncope and collapse                                                                 
                                                                                            
                    |
|   9. Type 2 diabetes mellitus with diabetic chronic kidney disease                        
                                                                                            
                    |
|   10. Atherosclerotic heart disease of native coronary artery without angina pectoris     
                                                                                            
                    |
|                                                                                           
                                                                                            
                    |
|[facility] [place] [facility]                           
                                                                                            
                    |
|  Acute ischemic heart disease, unspecified                                                
                                                                                            
                    |
|   Long term (current) use of insulin                                                      
                                                                                            
                    |
|   Dependence on renal dialysis                                                            
                                                                                            
                    |
|   End stage renal disease                                                                 
                                                                                            
                    |
|   Type 2 diabetes mellitus with diabetic chronic kidney disease                           
                                                                                            
                    |
|   Essential (primary) hypertension                                                        
                                                                                            
                    |
|   Anemia in chronic kidney disease                                                        
                                                                                            
                    |
|                                                                                           
                                                                                            
                    |
|2018 [facility]. [place] [facility]                                
                                                                                            
 
                    |
|  0. Cough                                                                                 
                                                                                            
                    |
|   1. Pneumonia due to Pseudomonas                                                         
                                                                                            
                    |
|   2. End stage renal disease                                                              
                                                                                            
                    |
|   3. 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
onic kidney disease, or end stage renal disease                                             
                    |
|   4. Cachexia                                                                             
                                                                                            
                    |
|   5. Chronic obstructive pulmonary disease with acute lower respiratory infection         
                                                                                            
                    |
|   6. Type 2 diabetes mellitus with diabetic chronic kidney disease                        
                                                                                            
                    |
|   7. Heart failure, unspecified                                                           
                                                                                            
                    |
|   8. Hyperlipidemia, unspecified                                                          
                                                                                            
                    |
|   9. Gastro-esophageal reflux disease without esophagitis                                 
                                                                                            
                    |
|                                                                                           
                                                                                            
                    |
|2018 [facility] Akash Cox. [place] Medical Surgical                                 
                                                                                            
                    |
|  0. Other chest pain                                                                      
                                                                                            
                    |
|   1. Gastro-esophageal reflux disease without esophagitis                                 
                                                                                            
                    |
|   2. End stage renal disease                                                              
                                                                                            
                    |
|   3. 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
onic kidney disease, or end stage renal disease                                             
                    |
|   4. Chronic systolic (congestive) heart failure                                          
                                                                                            
                    |
|   5. 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
ctoris                                                                                      
                    |
|   6. Type 2 diabetes mellitus with diabetic chronic kidney disease                        
                                                                                            
                    |
|   7. Hyperlipidemia, unspecified                                                          
                                                                                            
 
                    |
|   8. Major depressive disorder, single episode, unspecified                               
                                                                                            
                    |
|   9. Chronic obstructive pulmonary disease, unspecified                                   
                                                                                            
                    |
|                                                                                           
                                                                                            
                    |
|Mar 6, 2018 [facility] JANEEN Ramsay. [place] Cardiology                              
                                                                                            
                    |
|  Heart Failure                                                                            
                                                                                            
                    |
|   Need for iv access                                                                      
                                                                                            
                    |
|   Type 2 diabetes mellitus with other specified complication                              
                                                                                            
                    |
|   Long term (current) use of insulin                                                      
                                                                                            
                    |
|   Paroxysmal atrial fibrillation                                                          
                                                                                            
                    |
|   Anemia in chronic kidney disease                                                        
                                                                                            
                    |
|   Atherosclerotic heart disease of native coronary artery without angina pectoris         
                                                                                            
                    |
|   Cardiomyopathy, unspecified                                                             
                                                                                            
                    |
|   End stage renal disease                                                                 
                                                                                            
                    |
|   Other specified postprocedural states                                                   
                                                                                            
                    |
|                                                                                           
                                                                                            
                    |
|                                                                                           
                                                                                            
                    |
|                                                                                           
                                                                                            
                    |
|Care Providers                                                                             
                                                                                            
                    |
|Provider PRC Type Phone Fax Service Dates                                                  
                                                                                            
                    |
|JOHNS, ELZA HENDERSON Nurse Practitioner: Family   Current                                
                                                                                            
 
                    |
|Marco Antonio Martinez /Care Coordinator [phone]  2019 - Current         
                                                                                            
                    |
|[facility] Primary Care [phone]  2019 - Current                          
                                                                                            
                    |
|[facility] Primary Care  [phone] Current                         
                                                                                            
                    |
|[facility] Primary Care   Current                                
                                                                                            
                    |
|MANOLO GONZALEZ Primary Care   Current                                                        
                                                                                            
                    |
|[facility] Provider [phone] Current                 
                                                                                            
                    |
|[facility] Case or Care Manager   Current                                                   
                                                                                            
                    |
|MIRTA Goel Case or Care Manager [phone] Current
                                                                                            
                    |
|Jairo Gutierrez MD Other   Current                                                       
                                                                                            
                    |
|                                                                                           
                                                                                            
                    |
|[employer] Portal                                                                          
                                                                                            
                    |
|This patient has registered at the [facility]     
                                                                                            
                    |
|For more information visit: [email or web address]
[email or web address]                                                                               
                    |
|The above information is provided for the sole purpose of patient treatment. Use of this in
formation beyond the terms of Data Sharing Memorandum of Understanding and License Agreement
 is prohibited. In  |
|certain cases not all visits may be represented. Consult the aforementioned facilities for 
additional information.                                                                     
                    |
|2019 [employer] - [place], [place] - [email or web address]                                                                                       
                    |
+-------------------------------------------------------------------------------------------
--------------------------------------------------------------------------------------------
--------------------+
 
 
 
+-------------------+---------+--------------------+--------------+
| Performing        | Address | City/State/Zipcode | Phone Number |
| Organization      |         |                    |              |
+-------------------+---------+--------------------+--------------+
|   ED INFORMATION  |         |                    |              |
 
| EXCHANGE          |         |                    |              |
+-------------------+---------+--------------------+--------------+
 in this encounter
 
 Visit Diagnoses
 Not on filein this encounter

## 2019-04-09 NOTE — XMS
Encounter Summary
  Created on: 2019
 
 Cehrie Villalobos
 External Reference #: [id number]
 : 48
 Sex: Female
 
 Demographics
 
 
+-----------------------+--------------------------+
| Address               | [address]               |
|                       | [place]  [address] |
+-----------------------+--------------------------+
| Home Phone            | [phone]          |
+-----------------------+--------------------------+
| Preferred Language    | Unknown                  |
+-----------------------+--------------------------+
| Marital Status        |                   |
+-----------------------+--------------------------+
| Zoroastrian Affiliation | 1013                     |
+-----------------------+--------------------------+
| Race                  | Unknown                  |
+-----------------------+--------------------------+
| Ethnic Group          | Unknown                  |
+-----------------------+--------------------------+
 
 
 Author
 
 
+--------------+-----------------------+
| Author       | Sherry [employer] |
+--------------+-----------------------+
| Organization | [facility] |
+--------------+-----------------------+
| Address      | Unknown               |
+--------------+-----------------------+
| Phone        | Unavailable           |
+--------------+-----------------------+
 
 
 
 Support
 
 
+---------------+--------------+---------------------+-----------------+
| Name          | Relationship | Address             | Phone           |
+---------------+--------------+---------------------+-----------------+
| Anoop Villalobos | ECON         | [place], | [phone] |
|               |              |  [place]  [address]     |                 |
+---------------+--------------+---------------------+-----------------+
| Jennifer Dickson | ECON         | [place]       | [phone] |
|               |              | [phone]               |                 |
+---------------+--------------+---------------------+-----------------+
 
 
 
 
 Care Team Providers
 
 
+-----------------------+------+-----------------+
| Care Team Member Name | Role | Phone           |
+-----------------------+------+-----------------+
| Ivy Couch DO      | PCP  | [phone] |
+-----------------------+------+-----------------+
 
 
 
 Reason for Visit
 
 
+--------------------+------------------------------+
| Reason             | Comments                     |
+--------------------+------------------------------+
| Medication Problem | Requesting Rx for Wheelchair |
+--------------------+------------------------------+
 
 
 
 Encounter Details
 
 
+--------+-----------+----------------------+----------------------+---------------------+
| Date   | Type      | Department           | Care Team            | Description         |
+--------+-----------+----------------------+----------------------+---------------------+
| / | Telephone |   [facility] |   Srinivasan Jordan,  | Medication Problem  |
| 2019   |           |  and Ankle  [address]      | DPM  [address], | (Requesting Rx for  |
|        |           | [address]   |  [address]  [place],  | Wheelchair)         |
|        |           | [place], [place]         | [place] [address]             |                     |
|        |           | [address]           | [phone]         |                     |
|        |           | [phone]         | [phone] (Fax)   |                     |
+--------+-----------+----------------------+----------------------+---------------------+
 
 
 
 Social History
 
 
+---------------+------------+-----------+--------+------------------+
| Tobacco Use   | Types      | Packs/Day | Years  | Date             |
|               |            |           | Used   |                  |
+---------------+------------+-----------+--------+------------------+
| Former Smoker | Cigarettes | 1         | 30     | Quit: 2004 |
+---------------+------------+-----------+--------+------------------+
 
 
 
+---------------------+---+---+---+
| Smokeless Tobacco:  |   |   |   |
| Never Used          |   |   |   |
+---------------------+---+---+---+
 
 
 
+------------------------------+
| Comments: quite smoking  |
 
+------------------------------+
 
 
 
+-------------+-----------+---------+----------+
| Alcohol Use | Drinks/We | oz/Week | Comments |
|             | ek        |         |          |
+-------------+-----------+---------+----------+
| No          |           |         |          |
+-------------+-----------+---------+----------+
 
 
 
+------------------+---------------+
| Sex Assigned at  | Date Recorded |
| Birth            |               |
+------------------+---------------+
| Not on file      |               |
+------------------+---------------+
 as of this encounter
 
 Plan of Treatment
 
 
+--------+---------+-----------+----------------------+-------------+
| Date   | Type    | Specialty | Care Team            | Description |
+--------+---------+-----------+----------------------+-------------+
| 04/10/ | Office  | Podiatry  |   Srinivasan Jordan,  |             |
|    | Visit   |           | DPM  [address], |             |
|        |         |           |  [address]  [place],  |             |
|        |         |           | [place] [address]             |             |
|        |         |           | [phone]         |             |
|        |         |           | [phone] (Fax)   |             |
+--------+---------+-----------+----------------------+-------------+
 as of this encounter
 
 Visit Diagnoses
 Not on filein this encounter

## 2019-04-09 NOTE — XMS
Encounter Summary
  Created on: 2019
 
 Cherie Villalobos
 External Reference #: [id number]
 : 48
 Sex: Female
 
 Demographics
 
 
+-----------------------+--------------------------+
| Address               | [address]               |
|                       | [place]  [address] |
+-----------------------+--------------------------+
| Home Phone            | [phone]          |
+-----------------------+--------------------------+
| Preferred Language    | Unknown                  |
+-----------------------+--------------------------+
| Marital Status        |                   |
+-----------------------+--------------------------+
| [employer] Affiliation | 1013                     |
+-----------------------+--------------------------+
| Race                  | Unknown                  |
+-----------------------+--------------------------+
| Ethnic Group          | Unknown                  |
+-----------------------+--------------------------+
 
 
 Author
 
 
+--------------+-----------------------+
| Author       | Sherry [employer] |
+--------------+-----------------------+
| Organization | [facility] |
+--------------+-----------------------+
| Address      | Unknown               |
+--------------+-----------------------+
| Phone        | Unavailable           |
+--------------+-----------------------+
 
 
 
 Support
 
 
+---------------+--------------+---------------------+-----------------+
| Name          | Relationship | Address             | Phone           |
+---------------+--------------+---------------------+-----------------+
| Anoop Villalobos | ECON         | [place], | [phone] |
|               |              |  [place]  [address]     |                 |
+---------------+--------------+---------------------+-----------------+
| Jennifer Dickson | ECON         | [place]       | [phone] |
|               |              | [phone]               |                 |
+---------------+--------------+---------------------+-----------------+
 
 
 
 
 Care Team Providers
 
 
+-----------------------+------+-----------------+
| Care Team Member Name | Role | Phone           |
+-----------------------+------+-----------------+
| Ivy Couch DO      | PCP  | [phone] |
+-----------------------+------+-----------------+
 
 
 
 Encounter Details
 
 
+--------+-------------+----------------------+---------------------+-------------+
| Date   | Type        | Department           | Care Team           | Description |
+--------+-------------+----------------------+---------------------+-------------+
| / | Orders Only |   [facility] |   Luisa Segovia  |             |
| 2019   |             |  and Ankle  [address]      | [address]               |             |
|        |             | [address]   |                     |             |
|        |             | [place]         |                     |             |
|        |             | [address]           |                     |             |
|        |             | [phone]         |                     |             |
+--------+-------------+----------------------+---------------------+-------------+
 
 
 
 Social History
 
 
+---------------+------------+-----------+--------+------------------+
| Tobacco Use   | Types      | Packs/Day | Years  | Date             |
|               |            |           | Used   |                  |
+---------------+------------+-----------+--------+------------------+
| Former Smoker | Cigarettes | 1         | 30     | Quit: 2004 |
+---------------+------------+-----------+--------+------------------+
 
 
 
+---------------------+---+---+---+
| Smokeless Tobacco:  |   |   |   |
| Never Used          |   |   |   |
+---------------------+---+---+---+
 
 
 
+------------------------------+
| Comments: quite smoking 2004 |
+------------------------------+
 
 
 
+-------------+-----------+---------+----------+
| Alcohol Use | Drinks/We | oz/Week | Comments |
|             | ek        |         |          |
+-------------+-----------+---------+----------+
| No          |           |         |          |
+-------------+-----------+---------+----------+
 
 
 
 
+------------------+---------------+
| Sex Assigned at  | Date Recorded |
| Birth            |               |
+------------------+---------------+
| Not on file      |               |
+------------------+---------------+
 as of this encounter
 
 Plan of Treatment
 
 
+--------+---------+-----------+----------------------+-------------+
| Date   | Type    | Specialty | Care Team            | Description |
+--------+---------+-----------+----------------------+-------------+
| 04/10/ | Office  | Podiatry  |   Srinivasan Jordan,  |             |
| 2019   | Visit   |           | DPM  [address], |             |
|        |         |           |  [address]  [place],  |             |
|        |         |           | [place] [address]             |             |
|        |         |           | [phone]         |             |
|        |         |           | [phone] (Fax)   |             |
+--------+---------+-----------+----------------------+-------------+
 as of this encounter
 
 Visit Diagnoses
 Not on filein this encounter

## 2019-04-09 NOTE — XMS
Encounter Summary
  Created on: 2019
 
 Cherie Villalobos
 External Reference #: [id number]
 : 48
 Sex: Female
 
 Demographics
 
 
+-----------------------+--------------------------+
| Address               | [address]               |
|                       | [place]  [address] |
+-----------------------+--------------------------+
| Home Phone            | [phone]          |
+-----------------------+--------------------------+
| Preferred Language    | Unknown                  |
+-----------------------+--------------------------+
| Marital Status        |                   |
+-----------------------+--------------------------+
| Congregational Affiliation | [id number]                     |
+-----------------------+--------------------------+
| Race                  | Unknown                  |
+-----------------------+--------------------------+
| Ethnic Group          | Unknown                  |
+-----------------------+--------------------------+
 
 
 Author
 
 
+--------------+-----------------------+
| Author       | [employer] |
+--------------+-----------------------+
| Organization | [facility] |
+--------------+-----------------------+
| Address      | Unknown               |
+--------------+-----------------------+
| Phone        | Unavailable           |
+--------------+-----------------------+
 
 
 
 Support
 
 
+---------------+--------------+---------------------+-----------------+
| Name          | Relationship | Address             | Phone           |
+---------------+--------------+---------------------+-----------------+
| Anoop Villalobos | ECON         | [place], | [phone] |
|               |              |  [place]  [address]     |                 |
+---------------+--------------+---------------------+-----------------+
| Jennifer Dickson | ECON         | [place]       | [phone] |
|               |              | [id number]               |                 |
+---------------+--------------+---------------------+-----------------+
 
 
 
 
 Care Team Providers
 
 
+-----------------------+------+-----------------+
| Care Team Member Name | Role | Phone           |
+-----------------------+------+-----------------+
| Ivy Couch DO      | PCP  | [phone] |
+-----------------------+------+-----------------+
 
 
 
 Reason for Visit
 
 
+-----------+---------------------------+
| Reason    | Comments                  |
+-----------+---------------------------+
| Follow-up | 6 wk f/u possible pd cath |
+-----------+---------------------------+
 
 
 
 Encounter Details
 
 
+--------+---------+----------------------+----------------------+----------------------+
| Date   | Type    | Department           | Care Team            | Description          |
+--------+---------+----------------------+----------------------+----------------------+
| / | Office  |   [facility]      |   Kirt Mclaughlin MD     | PAD (peripheral      |
| 2019   | Visit   | Vascular Surgery     | [address]  | artery disease)      |
|        |         | [address] |  [place] [address]  | ([facility]) (Primary Dx);  |
|        |         |  E  [place], [place]     |  [phone]        | ESRD (end stage      |
|        |         | [address]           | [phone] (Fax)   | renal disease) ([facility]) |
|        |         | [phone]         |                      |                      |
+--------+---------+----------------------+----------------------+----------------------+
 
 
 
 Social History
 
 
+---------------+------------+-----------+--------+------------------+
| Tobacco Use   | Types      | Packs/Day | Years  | Date             |
|               |            |           | Used   |                  |
+---------------+------------+-----------+--------+------------------+
| Former Smoker | Cigarettes | 1         | 30     | Quit: 2004 |
+---------------+------------+-----------+--------+------------------+
 
 
 
+---------------------+---+---+---+
| Smokeless Tobacco:  |   |   |   |
| Never Used          |   |   |   |
+---------------------+---+---+---+
 
 
 
+------------------------------+
| Comments: quite smoking  |
 
+------------------------------+
 
 
 
+-------------+-----------+---------+----------+
| Alcohol Use | Drinks/We | oz/Week | Comments |
|             | ek        |         |          |
+-------------+-----------+---------+----------+
| No          |           |         |          |
+-------------+-----------+---------+----------+
 
 
 
+------------------+---------------+
| Sex Assigned at  | Date Recorded |
| Birth            |               |
+------------------+---------------+
| Not on file      |               |
+------------------+---------------+
 as of this encounter
 
 Last Filed Vital Signs
 
 
+-------------------+---------+-------------------------+
| Vital Sign        | Reading | Time Taken              |
+-------------------+---------+-------------------------+
| Blood Pressure    | 155/77  | 2019  1:14 PM PST |
+-------------------+---------+-------------------------+
| Pulse             | 89      | 2019  1:14 PM PST |
+-------------------+---------+-------------------------+
| Temperature       | -       | -                       |
+-------------------+---------+-------------------------+
| Respiratory Rate  | -       | -                       |
+-------------------+---------+-------------------------+
| Oxygen Saturation | 100%    | 2019  1:14 PM PST |
+-------------------+---------+-------------------------+
| Inhaled Oxygen    | -       | -                       |
| Concentration     |         |                         |
+-------------------+---------+-------------------------+
| Weight            | -       | -                       |
+-------------------+---------+-------------------------+
| Height            | -       | -                       |
+-------------------+---------+-------------------------+
| Body Mass Index   | -       | -                       |
+-------------------+---------+-------------------------+
 in this encounter
 
 Progress Notes
 Kirt Mclaughlin MD - 2019  2:45 PM PSTFormatting of this note may be different from the o
aleksandar.
 Ms. Villalobos is a pleasant 68 y.o. female with PMH significant for acute MI, anemia, Afib, KD
, COPD, CAD, diabetes, ESRD on HD, hyperlipidemia, hypertension who is referred to me for RL
E ulcerations and PD catheter consideration. Patient has ulcerations on her right second toe
 and is seen by both Infectious Disease and Podiatry for this. Patient had recent left foot 
amputation via Dr. Jordan in the end of December. Patient describes that she has ulceration
s on her hands that she had been told has been vascular in etiology. Patient is followed by 
Dr. Elliott, Infectious Disease, and is receiving IV antibiotics on days she has dialysis. Pratima
ent is also considering peritoneal dialysis catheter but still hesitant about this. Patient 
is seen by Dr. Matias, podiatry. 
 
 
 US Bilateral Lower Extremity Arterial
 
 Impression 
 1. Right leg shows biphasic inflow with a greater than 50% stenosis at 
 the origin of the superficial femoral artery (137-309). Biphasic 
 outflow. 
 2. Two vessel runoff to the right foot. 
 Signed by: Eugenio Hoffman 
 Sign Date/Time: 2019 3:11 PM 
 
 Past Medical History 
 Diagnosis Date 
   Acute MI ([facility])  
  with stenting x 1 
   Anemia associated with chronic renal failure 2016 
   Atrial fibrillation (HCC)  
   Chronic kidney disease  
   Congestive heart failure (HCC)  
   COPD (chronic obstructive pulmonary disease) (HCC)  
   COPD (chronic obstructive pulmonary disease) ([facility]) 2018 
   Coronary artery disease  
  stent placements: 3 total 
   Depression  
   Diabetes other 
  abstracted 
   Diabetes mellitus, type 2 (HCC)  
   ESRD on peritoneal dialysis (HCC)  
   GERD (gastroesophageal reflux disease)  
   Hemodialysis patient ([facility]) 10/2016 
  Stopped peritoneal and restarted hemodialysis 
   Hemorrhage of gastrointestinal tract, unspecified 2017 
  low GI, rectal bleeding 
   High blood pressure other 
  abstracted 
   High cholesterol other 
  abstracted 
   Hyperlipidemia  
   Hypertension  
   Hyponatremia 2017 
   Myocardial infarction (HCC) 2017 
   Other chronic pain  
  feet,  
   Pain of left hip joint 2016 
  due to hip fracture and replacement 
   Partial small bowel obstruction ([facility]) 2017 
  resolved 
   Renal failure  
  Stage 5.   Fistula in the JAN - not on dialysis yet. 
   Unspecified visual disturbance  
  glasses 
 
 Past Surgical History 
 Procedure Laterality Date 
   AV FISTULA PLACEMENT   
  left upper arm AV fistula - failed 
   AV FISTULA REPAIR N/A 10/25/2016 
  Procedure: AV FISTULA - GRAFT REPAIR/REVISION;  Surgeon: Kirt Mclaughlin MD;  Location: [facility]
IN OR;  Service: Vascular;  Laterality: N/A;  Superficialization and revision of left arm fi
stula 
 
   CARDIAC CATHETERIZATION  2017 
   CARPAL TUNNEL RELEASE Bilateral other 
  abstracted 
   CATARACT EXTRACTION Bilateral 2007 
   CATHETER REMOVAL N/A 2016 
  Procedure: DIALYSIS CATHETER - REMOVAL;  Surgeon: Kirt Mclaughlin MD;  Location: [facility] OR; 
 Service: Vascular;  Laterality: N/A;  PD cath removal 
   CHOLECYSTECTOMY  other 
  abstracted 
   COLONOSCOPY   
   CORONARY ARTERY BYPASS GRAFT   
   CORONARY STENT PLACEMENT  2017 
  Drug Elutin stents to OM, 1 stent to prox. LAD 
   EYE SURGERY   
   FOOT AMPUTATION Left 2018 
  Procedure: FOREFOOT - AMPUTATION;  Surgeon: Srinivasan Jordan DPM;  Location: [facility] MAIN OR;
  Service: Podiatry;  Laterality: Left; 
   HARDWARE PRESENT   
  stent placements x 3, Left hip replacement, vascular stents 2 in left leg 
   HIP ARTHROPLASTY Left 2016 
  Procedure: HIP - ARTHROPLASTY(ALLISON);  Surgeon: Uriel Roa MD;  Location: [facility] MAIN 
OR;  Service: Orthopedics;  Laterality: Left; 
   HYSTERECTOMY  1998 
  complete 
   PACEMAKER INSERTION   
  [employer] pacemaker/defib 
   PERITONEAL CATHETER INSERTION  8/15/2013 
   PERITONEAL CATHETER INSERTION N/A 2016 
  Procedure: LAPAROSCOPIC - PERITONEAL DIALYSIS CATH INSERTION;  Surgeon: Kirt Mclaughlin MD;  Lo
cation: [facility] MAIN OR;  Service: Vascular;  Laterality: N/A;  Removal of old catheter 
   SHOULDER SURGERY Right  
  frozen shoulder 
   UNLISTED PROCEDURE ARTHROSCOPY   
  total Left hip 
   vascular stents Left 2017 
  leg (2 stents) 
   VASCULAR SURGERY   
 
 Social History 
 Substance Use Topics 
   Smoking status: Former Smoker 
   Packs/day: 1.00 
   Years: 30.00 
   Types: Cigarettes 
   Quit date: 2004 
   Smokeless tobacco: Never Used 
    Comment: quite smoking  
   Alcohol use No 
 
 Family History 
 Problem Relation Age of Onset 
   High cholesterol Father  
   Hypertension Father  
   Diabetes Paternal Grandmother  
   Malig hypertherm Neg Hx  
   Kidney disease Neg Hx  
 
 Current Outpatient Prescriptions on File Prior to Visit 
 Medication Sig Dispense Refill 
   albuterol (PROVENTIL HFA;VENTOLIN HFA) 108 (90 Base) MCG/ACT inhaler Inhale 2 puffs int
 
o the lungs every 4 (four) hours as needed for Wheezing.   
   albuterol (VENTOLIN HFA) 108 (90 Base) MCG/ACT inhaler Ventolin HFA 90 mcg/actuation ae
rosol inhaler
  Inhale 2 puffs every 4 hours by inhalation route as needed.   
   apixaban (ELIQUIS) 2.5 MG tablet Take 1 tablet by mouth 2 (two) times daily. 60 tablet 
0 
   atorvastatin (LIPITOR) 40 MG tablet Take 1 tablet by mouth nightly. 30 tablet 0 
   bisacodyl (DULCOLAX) 10 MG suppository Place 10 mg rectally.   
   calcium acetate (PHOSLO) 667 MG capsule 667 mg 3 (three) times daily with meals.   
   carvedilol (COREG) 12.5 MG tablet Take 1 tablet by mouth 2 (two) times daily with meals
. 60 tablet 0 
   cefTAZidime (FORTAZ) 2 g injection Inject 2 g into the muscle After Hemodialysis (see a
dmin instructions) for 7 days. 1 each  
   Cholecalciferol (VITAMIN D3) 5000 UNITS CAPS Take 5,000 capsules by mouth every other d
ay.   
   clopidogrel (PLAVIX) 75 MG tablet Take 1 tablet by mouth daily. 30 tablet 11 
   esomeprazole (NEXIUM) 20 MG capsule NEXIUM(ESOMEPRAZOLE MAGNESIUM) 20 MG CAPSULE.DR
 Dose: 1 CAP   
   HYDROcodone-acetaminophen (NORCO) 5-325 MG per tablet Take 1 tablet by mouth every 4 (f
our) hours as needed. 30 tablet 0 
   insulin aspart (NOVOLOG) 100 UNIT/ML injection Inject  into the skin 3 (three) times da
melina before meals.   
   insulin degludec (TRESIBA) 100 UNIT/ML injection Tresiba Flextouch U-100(INSULIN DEGLUD
EC) 100 UNIT/1 ML INSULN.PEN
 Dose: 20 UNIT at night   
   isosorbide mononitrate (IMDUR) 60 MG 24 hr tablet Take 1 tablet by mouth daily. 30 tabl
et 1 
   loperamide (IMODIUM) 2 MG capsule 2 mg as needed.   
   LORazepam (ATIVAN) 1 MG tablet Take 1 tablet by mouth every 8 (eight) hours as needed f
or Anxiety. 30 tablet 0 
   Magnesium Cl-Calcium Carbonate (SLOW-MAG) 71.5-119 MG TBEC Take 1 tablet by mouth every
 other day.   
   nitroGLYCERIN (NITROSTAT) 0.4 MG SL tablet Place 1 tablet under the tongue every 5 (fiv
e) minutes as needed for Chest pain. 30 tablet 0 
   nortriptyline (PAMELOR) 25 MG capsule Take 25 mg by mouth 3 (three) times daily. Takes 
25 mg in am , and 75 mg at night   
   ondansetron (ZOFRAN-ODT) 4 MG disintegrating tablet Take 4 mg by mouth as needed.   
   oxybutynin (DITROPAN) 5 MG tablet Take 2.5 mg by mouth 2 (two) times daily.   
   prochlorperazine 5 MG tablet TAKE ONE TABLET BY MOUTH TWICE DAILY AS NEEDED FOR NAUSEA 
60 tablet 11 
   ranitidine (ZANTAC) 150 MG tablet ranitidine 150 mg tablet
  Take 1 tablet twice a day by oral route.   
   rOPINIRole (REQUIP) 0.25 MG tablet Take 0.75 mg by mouth nightly.   
   vancomycin (VANCOCIN) IVPB Inject 750 mg into the vein After Hemodialysis (see admin in
structions) for 7 days. Dilute in appropriate volume of IV fluid and give by slow IV infusio
n.  0 
   Vortioxetine HBr 10 MG TABS 10 mg nightly.   
 
 No current facility-administered medications on file prior to visit.  
 
 Allergies 
 Allergen Reactions 
   Quinapril Hcl Anaphylaxis 
   Digoxin And Related Other (See Comments) 
   toxic 
   Metaxalone Other (See Comments) 
   Morphine Mental Changes 
   Make her crazy/ "funny feeling in my head"
 Has used hydromorphone in-house 
   Pantoprazole Sodium Nausea and Vomiting 
 
   Penicillins Rash 
   Quinapril Hcl Edema 
   Facial Edema 
   Sudafed [Pseudoephedrine Hcl] Rash 
 
 Comprehensive ROS performed and pertinent items described in the HPI. 
 
 Vitals:reviewed
 CONSTITUTIONAL:  Conversant, well developed, NAD
 EYES: Anicteric sclerae, no lid drag, no proptosis
 RESP: Normal effort, regular, even, unlabored rate
 CV: No peripheral edema, rate regular
 SKIN: Pink, warm, dry without rash/lesion
 MS: ROM not limited, no digital cyanosis, normal gait
 NEURO: Cranial nerves II-XII grossly intact, A&O times 3
 PSYCH: appropriate affect, speech and tone, judgement and insight intact
 Vascular: strong biphasic signals in right foot.  
 
 Discussed ultrasound results from 19 with the patient, which show mildly decreased blo
od flow in her right leg. The patient's nonhealing right foot ulcerations are likely due to 
infection and not due to vascular issues. Discussed with the patient that the symptoms in he
r hands are due to vascular access steal syndrome. Recommended against PD catheter due to kurtis rodriguez's past issues concerning adhesions and recommended that the patient stay with HD as lo
ng as she is still tolerating it well. Discussed with the patient that the ulceration in her
 right foot still shows signs of infection and patient was advised to monitor it closely to 
prevent amputation.  If there is still poor healing after infection control, we may consider
 repeating arteriogram at that time. All questions and concerns addressed. Patient will foll
ow up in 1 month. Patient understands and is agreeable. 
 
 Kirt Mclaughlin MD
 
 Attending Note: Documentation assistance provided by Tito Clark (David). Information tammy
rded by the [email or web address] has been reviewed and validated by me. I agree with its contents.
 
 Signed by: David Combs 19, 1:38 PM
 in this encounter
 
 Plan of Treatment
 
 
+--------+---------+-----------+----------------------+-------------+
| Date   | Type    | Specialty | Care Team            | Description |
+--------+---------+-----------+----------------------+-------------+
| 04/10/ | Office  | Podiatry  |   Srinivasan Jordan,  |             |
|    | Visit   |           | DPM  [address] [facility] |             |
|        |         |           |  [address]  [place],  |             |
|        |         |           | [place] [address]             |             |
|        |         |           | [phone]         |             |
|        |         |           | [phone] (Fax)   |             |
+--------+---------+-----------+----------------------+-------------+
 as of this encounter
 
 Visit Diagnoses
 
 
+----------------------------------------------------+
| Diagnosis                                          |
+----------------------------------------------------+
|   PAD (peripheral artery disease) (HCC) - Primary  |
+----------------------------------------------------+
 
|   Unspecified disorders of arteries and arterioles |
+----------------------------------------------------+
|   ESRD (end stage renal disease) (HCC)             |
+----------------------------------------------------+
|   End stage renal disease                          |
+----------------------------------------------------+

## 2019-04-09 NOTE — XMS
Encounter Summary
  Created on: 2019
 
 Cherie Villalobos
 External Reference #: [id number]
 : 48
 Sex: Female
 
 Demographics
 
 
+-----------------------+--------------------------+
| Address               | [address]               |
|                       | [place]  [address] |
+-----------------------+--------------------------+
| Home Phone            | [phone]          |
+-----------------------+--------------------------+
| Preferred Language    | Unknown                  |
+-----------------------+--------------------------+
| Marital Status        |                   |
+-----------------------+--------------------------+
| Anabaptist Affiliation | 1013                     |
+-----------------------+--------------------------+
| Race                  | Unknown                  |
+-----------------------+--------------------------+
| Ethnic Group          | Unknown                  |
+-----------------------+--------------------------+
 
 
 Author
 
 
+--------------+--------------------------------------------+
| Author       | [facility]  |
|              | and [place]                                |
+--------------+--------------------------------------------+
| Organization | [facility]  |
|              | and [place]                                |
+--------------+--------------------------------------------+
| Address      | Unknown                                    |
+--------------+--------------------------------------------+
| Phone        | Unavailable                                |
+--------------+--------------------------------------------+
 
 
 
 Support
 
 
+---------------+--------------+---------------------+-----------------+
| Name          | Relationship | Address             | Phone           |
+---------------+--------------+---------------------+-----------------+
| Anoop Villalobos | ECON         | [address], | [phone] |
|               |              |  [place]  [address]     |                 |
+---------------+--------------+---------------------+-----------------+
| Jennifer Dickson | ECON         | [place]       | [phone] |
|               |              | [phone]               |                 |
+---------------+--------------+---------------------+-----------------+
 
 
 
 
 Care Team Providers
 
 
+--------------------------+------+-----------------+
| Care Team Member Name    | Role | Phone           |
+--------------------------+------+-----------------+
| Araseli Montelongo Activity  | PCP  | [phone] |
| Professional             |      |                 |
+--------------------------+------+-----------------+
 
 
 
 Reason for Visit
 
 
+--------+----------+
| Reason | Comments |
+--------+----------+
| Other  | [place]itude |
+--------+----------+
 
 
 
 Encounter Details
 
 
+--------+-----------+----------------------+----------------------+------------------+
| Date   | Type      | Department           | Care Team            | Description      |
+--------+-----------+----------------------+----------------------+------------------+
| / | Telephone |   [facility]          |   Johnie John, | Other (Latitude) |
| 2019   |           | CARDIOLOGY  [address]    |  MD  [address] |                  |
|        |           | [place]  [place], |  [place]  [place],   |                  |
|        |           |  [place] [address]       | [place] [address]             |                  |
|        |           | [phone]         | [phone]         |                  |
|        |           |                      | [phone] (Fax)   |                  |
+--------+-----------+----------------------+----------------------+------------------+
 
 
 
 Social History
 
 
+---------------+------------+-----------+--------+------------------+
| Tobacco Use   | Types      | Packs/Day | Years  | Date             |
|               |            |           | Used   |                  |
+---------------+------------+-----------+--------+------------------+
| Former Smoker | Cigarettes | 1         | 30     | Quit: 2004 |
+---------------+------------+-----------+--------+------------------+
 
 
 
+---------------------+---+---+---+
| Smokeless Tobacco:  |   |   |   |
| Never Used          |   |   |   |
+---------------------+---+---+---+
 
 
 
 
+-------------+-----------+---------+----------+
| Alcohol Use | Drinks/We | oz/Week | Comments |
|             | ek        |         |          |
+-------------+-----------+---------+----------+
| No          |           |         |          |
+-------------+-----------+---------+----------+
 
 
 
+------------------+---------------+
| Sex Assigned at  | Date Recorded |
| Birth            |               |
+------------------+---------------+
| Not on file      |               |
+------------------+---------------+
 
 
 
+----------------+-------------+-------------+
| Job Start Date | Occupation  | Industry    |
+----------------+-------------+-------------+
| Not on file    | Not on file | Not on file |
+----------------+-------------+-------------+
 
 
 
+----------------+--------------+------------+
| Travel History | Travel Start | Travel End |
+----------------+--------------+------------+
 
 
 
+-------------------------------------+
| No recent travel history available. |
+-------------------------------------+
 documented as of this encounter
 
 Functional Status
 
 
+---------------------------------------------+----------+--------------------+
| Functional Status                           | Response | Date of Assessment |
+---------------------------------------------+----------+--------------------+
| Are you deaf or do you have serious         | No       | 2018         |
| difficulty hearing?                         |          |                    |
+---------------------------------------------+----------+--------------------+
| Are you blind or do you have serious        | No       | 2018         |
| difficulty seeing, even when wearing        |          |                    |
| glasses?                                    |          |                    |
+---------------------------------------------+----------+--------------------+
| Do you have serious difficulty walking or   | No       | 2018         |
| climbing stairs? (5 years old or older)     |          |                    |
+---------------------------------------------+----------+--------------------+
| Do you have difficulty dressing or bathing? | No       | 2018         |
|  (5 years old or older)                     |          |                    |
+---------------------------------------------+----------+--------------------+
| Because of a physical, mental, or emotional | No       | 2018         |
|  condition, do you have difficulty doing    |          |                    |
| errands alone such as visiting a doctor's   |          |                    |
 
| office or shopping? [15 years old or        |          |                    |
| older)]                                     |          |                    |
+---------------------------------------------+----------+--------------------+
 
 
 
+---------------------------------------------+----------+--------------------+
| Cognitive Status                            | Response | Date of Assessment |
+---------------------------------------------+----------+--------------------+
| Because of a physical, mental, or emotional | No       | 2018         |
|  condition, do you have serious difficulty  |          |                    |
| concentrating, remembering, or making       |          |                    |
| decisions? (5 years old or older)           |          |                    |
+---------------------------------------------+----------+--------------------+
 documented as of this encounter
 
 Plan of Treatment
 
 
+--------+---------+------------+----------------------+-------------+
| Date   | Type    | Specialty  | Care Team            | Description |
+--------+---------+------------+----------------------+-------------+
| / | Office  | Cardiology |   Johnie John, |             |
|    | Visit   |            |  MD  [address] |             |
|        |         |            |  [place],   |             |
|        |         |            | [place] [address]             |             |
|        |         |            | [phone]         |             |
|        |         |            | [phone] (Fax)   |             |
+--------+---------+------------+----------------------+-------------+
 documented as of this encounter
 
 Visit Diagnoses
 Not on filedocumented in this encounter

## 2019-04-09 NOTE — XMS
Encounter Summary
  Created on: 2019
 
 Cherie Villalobos
 External Reference #: [id number]
 : 48
 Sex: Female
 
 Demographics
 
 
+-----------------------+--------------------------+
| Address               | [address]               |
|                       | [place]  [address] |
+-----------------------+--------------------------+
| Home Phone            | [phone]          |
+-----------------------+--------------------------+
| Preferred Language    | Unknown                  |
+-----------------------+--------------------------+
| Marital Status        |                   |
+-----------------------+--------------------------+
| Zoroastrianism Affiliation | 1013                     |
+-----------------------+--------------------------+
| Race                  | Unknown                  |
+-----------------------+--------------------------+
| Ethnic Group          | Unknown                  |
+-----------------------+--------------------------+
 
 
 Author
 
 
+--------------+--------------------------------------------+
| Author       | [facility]  |
|              | and [place]                                |
+--------------+--------------------------------------------+
| Organization | [facility]  |
|              | and [place]                                |
+--------------+--------------------------------------------+
| Address      | Unknown                                    |
+--------------+--------------------------------------------+
| Phone        | Unavailable                                |
+--------------+--------------------------------------------+
 
 
 
 Support
 
 
+---------------+--------------+---------------------+-----------------+
| Name          | Relationship | Address             | Phone           |
+---------------+--------------+---------------------+-----------------+
| Anoop Villalobos | ECON         | [address], | [phone] |
|               |              |  [place]  [address]     |                 |
+---------------+--------------+---------------------+-----------------+
| Jennifer Dickson | ECON         | [place]       | [phone] |
|               |              | [phone]               |                 |
+---------------+--------------+---------------------+-----------------+
 
 
 
 
 Care Team Providers
 
 
+--------------------------+------+-----------------+
| Care Team Member Name    | Role | Phone           |
+--------------------------+------+-----------------+
| Araseli Montelongo Activity  | PCP  | [phone] |
| Professional             |      |                 |
+--------------------------+------+-----------------+
 
 
 
 Reason for Visit
 
 
+--------+----------+
| Reason | Comments |
+--------+----------+
| Other  | [place] |
+--------+----------+
 
 
 
 Encounter Details
 
 
+--------+-----------+----------------------+----------------------+------------------+
| Date   | Type      | Department           | Care Team            | Description      |
+--------+-----------+----------------------+----------------------+------------------+
| / | Telephone |   [facility]          |   Johnie John, | Other ([place]) |
| 2019   |           | CARDIOLOGY  [address]    |  MD  [address] |                  |
|        |           | [address]  [place], |  St  [place],   |                  |
|        |           |  [place] [address]       | [place] [address]             |                  |
|        |           | [phone]         | [phone]         |                  |
|        |           |                      | [phone] (Fax)   |                  |
+--------+-----------+----------------------+----------------------+------------------+
 
 
 
 Social History
 
 
+---------------+------------+-----------+--------+------------------+
| Tobacco Use   | Types      | Packs/Day | Years  | Date             |
|               |            |           | Used   |                  |
+---------------+------------+-----------+--------+------------------+
| Former Smoker | Cigarettes | 1         | 30     | Quit: 2004 |
+---------------+------------+-----------+--------+------------------+
 
 
 
+---------------------+---+---+---+
| Smokeless Tobacco:  |   |   |   |
| Never Used          |   |   |   |
+---------------------+---+---+---+
 
 
 
 
+-------------+-----------+---------+----------+
| Alcohol Use | Drinks/We | oz/Week | Comments |
|             | ek        |         |          |
+-------------+-----------+---------+----------+
| No          |           |         |          |
+-------------+-----------+---------+----------+
 
 
 
+------------------+---------------+
| Sex Assigned at  | Date Recorded |
| Birth            |               |
+------------------+---------------+
| Not on file      |               |
+------------------+---------------+
 
 
 
+----------------+-------------+-------------+
| Job Start Date | Occupation  | Industry    |
+----------------+-------------+-------------+
| Not on file    | Not on file | Not on file |
+----------------+-------------+-------------+
 
 
 
+----------------+--------------+------------+
| Travel History | Travel Start | Travel End |
+----------------+--------------+------------+
 
 
 
+-------------------------------------+
| No recent travel history available. |
+-------------------------------------+
 documented as of this encounter
 
 Functional Status
 
 
+---------------------------------------------+----------+--------------------+
| Functional Status                           | Response | Date of Assessment |
+---------------------------------------------+----------+--------------------+
| Are you deaf or do you have serious         | No       | 2018         |
| difficulty hearing?                         |          |                    |
+---------------------------------------------+----------+--------------------+
| Are you blind or do you have serious        | No       | 2018         |
| difficulty seeing, even when wearing        |          |                    |
| glasses?                                    |          |                    |
+---------------------------------------------+----------+--------------------+
| Do you have serious difficulty walking or   | No       | 2018         |
| climbing stairs? (5 years old or older)     |          |                    |
+---------------------------------------------+----------+--------------------+
| Do you have difficulty dressing or bathing? | No       | 2018         |
|  (5 years old or older)                     |          |                    |
+---------------------------------------------+----------+--------------------+
| Because of a physical, mental, or emotional | No       | 2018         |
|  condition, do you have difficulty doing    |          |                    |
| errands alone such as visiting a doctor's   |          |                    |
 
| office or shopping? [15 years old or        |          |                    |
| older)]                                     |          |                    |
+---------------------------------------------+----------+--------------------+
 
 
 
+---------------------------------------------+----------+--------------------+
| Cognitive Status                            | Response | Date of Assessment |
+---------------------------------------------+----------+--------------------+
| Because of a physical, mental, or emotional | No       | 2018         |
|  condition, do you have serious difficulty  |          |                    |
| concentrating, remembering, or making       |          |                    |
| decisions? (5 years old or older)           |          |                    |
+---------------------------------------------+----------+--------------------+
 documented as of this encounter
 
 Plan of Treatment
 
 
+--------+---------+------------+----------------------+-------------+
| Date   | Type    | Specialty  | Care Team            | Description |
+--------+---------+------------+----------------------+-------------+
| / | Office  | Cardiology |   Johnie John, |             |
|    | Visit   |            |  MD  [address] |             |
|        |         |            |  [place],   |             |
|        |         |            | [place] [address]             |             |
|        |         |            | [phone]         |             |
|        |         |            | [phone] (Fax)   |             |
+--------+---------+------------+----------------------+-------------+
 documented as of this encounter
 
 Visit Diagnoses
 Not on filedocumented in this encounter

## 2019-04-09 NOTE — XMS
Encounter Summary
  Created on: 2019
 
 Cherie Villalobos
 External Reference #: [id number]
 : 48
 Sex: Female
 
 Demographics
 
 
+-----------------------+--------------------------+
| Address               | [address]               |
|                       | [place]  [address] |
+-----------------------+--------------------------+
| Home Phone            | [phone]          |
+-----------------------+--------------------------+
| Preferred Language    | Unknown                  |
+-----------------------+--------------------------+
| Marital Status        |                   |
+-----------------------+--------------------------+
| Baptism Affiliation | 1013                     |
+-----------------------+--------------------------+
| Race                  | Unknown                  |
+-----------------------+--------------------------+
| Ethnic Group          | Unknown                  |
+-----------------------+--------------------------+
 
 
 Author
 
 
+--------------+-----------------------+
| Author       | [facility] |
+--------------+-----------------------+
| Organization | [facility] |
+--------------+-----------------------+
| Address      | Unknown               |
+--------------+-----------------------+
| Phone        | Unavailable           |
+--------------+-----------------------+
 
 
 
 Support
 
 
+---------------+--------------+---------------------+-----------------+
| Name          | Relationship | Address             | Phone           |
+---------------+--------------+---------------------+-----------------+
| Anoop Villalobos | ECON         | [place], | [phone] |
|               |              |  [place]  [address]     |                 |
+---------------+--------------+---------------------+-----------------+
| Jennifer Dickson | ECON         | [place]       | [phone] |
|               |              | [address]               |                 |
+---------------+--------------+---------------------+-----------------+
 
 
 
 
 Care Team Providers
 
 
+-----------------------+------+-----------------+
| Care Team Member Name | Role | Phone           |
+-----------------------+------+-----------------+
| Ivy Couch DO      | PCP  | [phone] |
+-----------------------+------+-----------------+
 
 
 
 Reason for Visit
 
 
+--------------------+------------------------------+
| Reason             | Comments                     |
+--------------------+------------------------------+
| Medication Problem | Requesting Rx for Wheelchair |
+--------------------+------------------------------+
 
 
 
 Encounter Details
 
 
+--------+-----------+----------------------+----------------------+---------------------+
| Date   | Type      | Department           | Care Team            | Description         |
+--------+-----------+----------------------+----------------------+---------------------+
| / | Telephone |   [facility] |   Srinivasan Jordan,  | Medication Problem  |
| 2019   |           |  and Ankle  [address]      | [facility]  [address] | (Requesting Rx for  |
|        |           | [address]   |  [address]  [place],  | Wheelchair)         |
|        |           | [place], [place]         | [place] [address]             |                     |
|        |           | [address]           | [phone]         |                     |
|        |           | [phone]         | [phone] (Fax)   |                     |
+--------+-----------+----------------------+----------------------+---------------------+
 
 
 
 Social History
 
 
+---------------+------------+-----------+--------+------------------+
| Tobacco Use   | Types      | Packs/Day | Years  | Date             |
|               |            |           | Used   |                  |
+---------------+------------+-----------+--------+------------------+
| Former Smoker | Cigarettes | 1         | 30     | Quit: 2004 |
+---------------+------------+-----------+--------+------------------+
 
 
 
+---------------------+---+---+---+
| Smokeless Tobacco:  |   |   |   |
| Never Used          |   |   |   |
+---------------------+---+---+---+
 
 
 
+------------------------------+
| Comments: quite smoking  |
 
+------------------------------+
 
 
 
+-------------+-----------+---------+----------+
| Alcohol Use | Drinks/We | oz/Week | Comments |
|             | ek        |         |          |
+-------------+-----------+---------+----------+
| No          |           |         |          |
+-------------+-----------+---------+----------+
 
 
 
+------------------+---------------+
| Sex Assigned at  | Date Recorded |
| Birth            |               |
+------------------+---------------+
| Not on file      |               |
+------------------+---------------+
 as of this encounter
 
 Plan of Treatment
 
 
+--------+---------+-----------+----------------------+-------------+
| Date   | Type    | Specialty | Care Team            | Description |
+--------+---------+-----------+----------------------+-------------+
| 04/10/ | Office  | Podiatry  |   Srinivasan Jordan,  |             |
|    | Visit   |           | DPM  [address], |             |
|        |         |           |  [address]  [place],  |             |
|        |         |           | [place] [address]             |             |
|        |         |           | [phone]         |             |
|        |         |           | [phone] (Fax)   |             |
+--------+---------+-----------+----------------------+-------------+
 as of this encounter
 
 Visit Diagnoses
 Not on filein this encounter

## 2019-04-09 NOTE — XMS
Encounter Summary
  Created on: 2019
 
 Cherie Villalobos
 External Reference #: [id number]
 : 48
 Sex: Female
 
 Demographics
 
 
+-----------------------+--------------------------+
| Address               | [address]               |
|                       | [place]  [address] |
+-----------------------+--------------------------+
| Home Phone            | [phone]          |
+-----------------------+--------------------------+
| Preferred Language    | Unknown                  |
+-----------------------+--------------------------+
| Marital Status        |                   |
+-----------------------+--------------------------+
| [employer] Affiliation | [id number]                     |
+-----------------------+--------------------------+
| Race                  | Unknown                  |
+-----------------------+--------------------------+
| Ethnic Group          | Unknown                  |
+-----------------------+--------------------------+
 
 
 Author
 
 
+--------------+-----------------------+
| Author       | [employer] |
+--------------+-----------------------+
| Organization | [facility] |
+--------------+-----------------------+
| Address      | Unknown               |
+--------------+-----------------------+
| Phone        | Unavailable           |
+--------------+-----------------------+
 
 
 
 Support
 
 
+---------------+--------------+---------------------+-----------------+
| Name          | Relationship | Address             | Phone           |
+---------------+--------------+---------------------+-----------------+
| Anoop Villalobos | ECON         | [place], | [phone] |
|               |              |  [place]  [address]     |                 |
+---------------+--------------+---------------------+-----------------+
| Jennifer Dickson | ECON         | [place]       | [phone] |
|               |              | [phone]               |                 |
+---------------+--------------+---------------------+-----------------+
 
 
 
 
 Care Team Providers
 
 
+-----------------------+------+-----------------+
| Care Team Member Name | Role | Phone           |
+-----------------------+------+-----------------+
| Ivy Montague DO      | PCP  | [phone] |
+-----------------------+------+-----------------+
 
 
 
 Reason for Referral
 Home Health Care (Routine)
 
+-------------+--------------+--------------+--------------+--------------+--------------+
| Status      | Reason       | Specialty    | Diagnoses /  | Referred By  | Referred To  |
|             |              |              | Procedures   | Contact      | Contact      |
+-------------+--------------+--------------+--------------+--------------+--------------+
| New Request |   Specialty  | Home Health  |   Diagnoses  |              |              |
|             | Services     | Services     |  Fall in     | Sanna,  |              |
|             | Required     |              | home,        | MD Angelique    |              |
|             |              |              | initial      | [address]    |              |
|             |              |              | encounter    | [address]VD         |              |
|             |              |              |              | [place], [place] |              |
|             |              |              |              |  [address]       |              |
|             |              |              |              | Phone:       |              |
|             |              |              |              | [phone] |              |
|             |              |              |              |   Fax:       |              |
|             |              |              |              | [phone] |              |
+-------------+--------------+--------------+--------------+--------------+--------------+
 
 
 
 
 Reason for Visit
 
 
+----------------+-------------+
| Reason         | Comments    |
+----------------+-------------+
| Referral       | Dr. Elliott    |
+----------------+-------------+
| Skin Complaint | right foot  |
+----------------+-------------+
 Auth/Cert
 
+--------+--------+-----------+--------------+--------------+---------------+
| Status | Reason | Specialty | Diagnoses /  | Referred By  | Referred To   |
|        |        |           | Procedures   | Contact      | Contact       |
+--------+--------+-----------+--------------+--------------+---------------+
|        |        |           |   Diagnoses  |              |   Kr 3rd    |
|        |        |           |  Generalized |              | [address] [place] |
|        |        |           |  weakness    |              |  Pavilion     |
|        |        |           | Closed       |              | [address]     |
|        |        |           | displaced    |              | Blvd          |
|        |        |           | fracture of  |              | [place], [place]  |
|        |        |           | distal       |              | [address]  Phone: |
|        |        |           | phalanx of   |              |  [phone] |
|        |        |           | right great  |              |   Fax:        |
 
|        |        |           | toe, initial |              | [phone]  |
|        |        |           |  encounter   |              |               |
|        |        |           | Fall in      |              |               |
|        |        |           | home,        |              |               |
|        |        |           | initial      |              |               |
|        |        |           | encounter    |              |               |
|        |        |           | Cellulitis   |              |               |
|        |        |           | of right toe |              |               |
|        |        |           |   Fatigue,   |              |               |
|        |        |           | unspecified  |              |               |
|        |        |           | type         |              |               |
|        |        |           |              |              |               |
+--------+--------+-----------+--------------+--------------+---------------+
 
 
 
 
 Encounter Details
 
 
+--------+-----------+----------------------+----------------------+----------------------+
| Date   | Type      | Department           | Care Team            | Description          |
+--------+-----------+----------------------+----------------------+----------------------+
| / | Emergency |   [facility]    |   Cookson, Rachel M, | Fall in home,        |
| 2019 - |           | [facility]     |  DO  [address]  | initial encounter    |
|        |           | [address]        |  [place] [address]  | (Primary Dx);        |
| 02/15/ |           | Pavilion  [address]  |  [phone]        | Cellulitis of right  |
| 2019   |           | Blvd  [place], [place]   | Ginny Raya MD  | toe; Closed          |
|        |           | [address]  [phone]  |  [address]      | displaced fracture   |
|        |           |                      | [place] [address]   | of distal phalanx of |
|        |           |                      | [phone]         |  right great toe,    |
|        |           |                      | [phone] (Fax)   | initial encounter;   |
|        |           |                      | Angelique Isidro,   | Generalized          |
|        |           |                      | MD  [address]   | weakness; Fatigue,   |
|        |           |                      | [place] [address]   | unspecified type     |
|        |           |                      | [phone]         |                      |
|        |           |                      | [phone] (Fax)   |                      |
+--------+-----------+----------------------+----------------------+----------------------+
 
 
 
 Social History
 
 
+---------------+------------+-----------+--------+------------------+
| Tobacco Use   | Types      | Packs/Day | Years  | Date             |
|               |            |           | Used   |                  |
+---------------+------------+-----------+--------+------------------+
| Former Smoker | Cigarettes | 1         | 30     | Quit: 2004 |
+---------------+------------+-----------+--------+------------------+
 
 
 
+---------------------+---+---+---+
| Smokeless Tobacco:  |   |   |   |
| Never Used          |   |   |   |
+---------------------+---+---+---+
 
 
 
 
+------------------------------+
| Comments: quite smoking  |
+------------------------------+
 
 
 
+-------------+-----------+---------+----------+
| Alcohol Use | Drinks/We | oz/Week | Comments |
|             | ek        |         |          |
+-------------+-----------+---------+----------+
| No          |           |         |          |
+-------------+-----------+---------+----------+
 
 
 
+------------------+---------------+
| Sex Assigned at  | Date Recorded |
| Birth            |               |
+------------------+---------------+
| Not on file      |               |
+------------------+---------------+
 as of this encounter
 
 Last Filed Vital Signs
 
 
+-------------------+-------------------+-------------------------+
| Vital Sign        | Reading           | Time Taken              |
+-------------------+-------------------+-------------------------+
| Blood Pressure    | 112/57            | 02/15/2019 11:30 AM PST |
+-------------------+-------------------+-------------------------+
| Pulse             | 66                | 02/15/2019 11:30 AM PST |
+-------------------+-------------------+-------------------------+
| Temperature       | 36.7   C (98   F) | 02/15/2019 11:30 AM PST |
+-------------------+-------------------+-------------------------+
| Respiratory Rate  | 18                | 02/15/2019 11:30 AM PST |
+-------------------+-------------------+-------------------------+
| Oxygen Saturation | 96%               | 02/15/2019 11:30 AM PST |
+-------------------+-------------------+-------------------------+
| Inhaled Oxygen    | -                 | -                       |
| Concentration     |                   |                         |
+-------------------+-------------------+-------------------------+
| Weight            | 65.3 kg (144 lb)  | 2019 10:29 PM PST |
+-------------------+-------------------+-------------------------+
| Height            | 177.8 cm (5' 10") | 2019 10:29 PM PST |
+-------------------+-------------------+-------------------------+
| Body Mass Index   | 20.66             | 2019 10:29 PM PST |
+-------------------+-------------------+-------------------------+
 in this encounter
 
 Discharge Summaries
 Angelique Isidro MD - 02/15/2019  2:09 PM PSTFormatting of this note may be different fro
m the original.
 
 Patient:  Cherie Villalobos         :  1950     MRN:  [id number]
 Date of Admission:  2019  
 Date of Discharge:  2/15/2019   
 Treatment Team: 
 Consulting Physician: Srinivasan Abdi DPM
 Consulting Physician: Andrés Elliott MD
 
 Consulting Physician: João Asher MD
 Admitting Provider: Ginny Raya MD
 Discharging Provider:  ANGELIQUE ISIDRO MD
 Discharge Diagnoses: 
 Principal Problem:
   Falls frequently
 Active Problems:
   Depression
   ESRD (end stage renal disease) ([facility])
   Type 2 diabetes mellitus with chronic kidney disease on chronic dialysis, with long-term 
current use of insulin ([facility])
   Anemia in ESRD (end-stage renal disease) ([facility])
   Hypertension, essential
   Dyslipidemia
   Gastroesophageal reflux disease without esophagitis
   Loss of weight
   Severe protein-calorie malnutrition ([facility])
   Chronic systolic congestive heart failure ([facility])
   COPD (chronic obstructive pulmonary disease) ([facility])
   ICD (implantable cardioverter-defibrillator) in place
   Paroxysmal atrial fibrillation ([facility])
   S/P CABG x 2
   S/P mitral valve repair
   PVD (peripheral vascular disease) ([facility])
   CAD (coronary artery disease)
   Macrocytosis
   Lactic acidosis
   Restless legs syndrome
   Overactive bladder
 Resolved Problems:
   * No resolved hospital problems. *
     
 Procedures Performed:
 
 Chief Complaint:
 Referral (Dr. Elliott) and Skin Complaint (right foot )
 
 Hospital Course: 
 
 Frequent falls: 
 Assessment:  It was unclear initially  The exact culprit and it was felt potentially she wa
s a bit dry (as per nephrology who saw her this admission) as she had just had ultrafiltrati
on with HD, versus other culprit.  However the patient did state that when she has recently 
fallen it was in the setting of not using her walker as she had been instructed to as she wa
s only going a very short distance and felt she did not need it.  She will be very diligent 
about using her walker and taking all needed precautions.  
 
 Coronary artery disease with history of CABG, peripheral vascular disease, hypertension and
 hyperlipidemia with history of paroxysmal atrial fibrillation on anticoagulation:.  Will re
sume PTA meds as below on d/c 
 
  
 Overactive bladder: Continue oxybutynin. 
  
 Requip for restless leg syndrome.
  
 Anxiety depression: recommended to Refrain from using benzodiazepinesas much as possible an
d per d/w nephrology will decrease the ativan dose. 
 
  
 
 Diabetes mellitus with diabetic nephropathy with end-stage renal disease on hemodialysis: 
 Continue with current regimen for now, follow, and adjust as needed based on progress. 
 F/u with outpatient providers 
 
  
 
 With respect to her left foot prior injury and prior antibiotics: 
 Assessment:  She was seen by ID here and recently had completed a course of antbx reportedl
y.  They would like to have her off antbx for now and f/u withthem.  Should f/u with her pod
iatrist dr abdi for her foot as well. 
 
 Discharge Exam and Data:
 Vital Signs:
 /57 (BP Location: Right upper arm)  | Pulse 66  | Temp 98 F (36.7 C) (Oral)  | Re
sp 18  | Ht 1.778 m (5' 10")  | Wt 65.3 kg (144 lb)  | SpO2 96%  | Breastfeeding? No  | BMI 
20.66 kg/m 
 I&O Last 3 Shifts:  No intake/output data recorded.
 
 Physical Examination: 
 
 Constitutional: Alert and oriented to person, place, and time. Appears well-developed and w
ell-nourished. 
 
 HEENT: Neck supple, no JVD, non icteric sclera.
 
 Cardiovascular: Normal rate, regular rhythm, normal heart sounds, and intact distal pulses.
  Exam reveals no appreciated gallop or friction rub.  No murmur heard.
 
 Pulmonary/Chest: Effort normal and breath sounds normal. No stridor. No respiratory distres
s.  no wheezes. no rales. exhibits no tenderness. 
 
 Abdominal: Soft. Bowel sounds are normal. exhibits no distension. There is no tenderness. T
here is no rebound and no guarding. 
 
 Extremeties/Musculoskeletal: Normal general range of motion.exhibits no tenderness.  exhibi
ts no edema. Left foot in boot wrapped in dressing, see wound care and ID george . 
   
 Neurological:  Alert and oriented to person, place, and time. Has normal reflexes.  No morteza
s cranial nerve or focal deficit.  Exhibits normal muscle tone. Coordination normal.
 
 Skin: Skin is warm and dry. No rash noted. No erythema. No pallor. 
 
 Psychiatric: Has a normal mood and affect given situation. Behavior is normal. Judgment nor
mal for patient. 
 
 Recent Labs 
 Recent Labs
 Lab 19 
 WBC 5.14 
 HGB 10.1* 
 HCT 30.9* 
 * 
 
 Recent Labs
 Lab 19 
  
 K 4.7 
 CL 96* 
 
 CO2 >40* 
 BUN 9 
 CREATININE 2.96* 
 No results for input(s): INR in the last 168 hours.
 
 Results  
  Procedure Component Value Units Date/Time 
  Blood Culture Set 2 [[id number]] Collected:  19 
  Specimen:  Blood from Blood, peripheral draw Updated:  19 
  Blood Culture Set 1 [[id number]] Collected:  19 165 
  Specimen:  Blood from Blood Line Draw Updated:  19 
  
 
 Recent Radiology Results
 Xr Toe Right 2 View
 
 Result Date: 2019
 No radiographic evidence of osteomyelitis seen.  If further assessment is warranted, consid
er correlation with MRI. Potential acute fracture through the base of the distal phalanx. Si
gned by: Gavin South Sign Date/Time: 2019 5:15 PM
 
 Outstanding Issues:  
 F/u with podiatry, ID, PCP and resume HD.  
 
 Discharge Information:
 Follow up:
 Ivy Montague DO
 [address], [address]
 [facility] [id number] [phone]
 
 Schedule an appointment as soon as possible for a visit
 
 Srinivasan Abdi DPM
 [address] [facility], [facility] [address]
 [facility] [id number] [phone]
 
 Schedule an appointment as soon as possible for a visit
 please continue prior to admission plan
 
 Andrés Elliott MD
 [address] [facility] [address] [phone]
 
 Call
 please call to see when they would like to see you in follow up 
 
 resume care with all providers you were seeing prior to admission 
 
  
 Medication List 
  
 CHANGE how you take these medications  
 LORazepam 1 MG tablet
 Refills:  0
 Commonly known as:  ATIVAN
 Take 0.5 tablets by mouth every 8 (eight) hours as needed for Anxiety. Patient states she h
as the 1mg tablets at home and that they are scored and she will split them in half
 
 What changed:
  how much to take
  additional instructions
  
  
 CONTINUE taking these medications  
 albuterol 108 (90 Base) MCG/ACT inhaler
 Refills:  0
 Commonly known as:  PROVENTIL HFA;VENTOLIN HFA
  
 apixaban 2.5 MG tablet
 QTY:  60 tablet
 Refills:  0
 Commonly known as:  ELIQUIS
 Take 1 tablet by mouth 2 (two) times daily.
  
 atorvastatin 80 MG tablet
 Refills:  0
 Commonly known as:  LIPITOR
  
 carvedilol 12.5 MG tablet
 QTY:  60 tablet
 Refills:  0
 Doctor's comments:  Hold for SBP less than 100
 Hold for HR less than 60
 Commonly known as:  COREG
 Take 1 tablet by mouth 2 (two) times daily with meals.
  
 esomeprazole 40 MG capsule
 Refills:  0
 Commonly known as:  NEXIUM
  
 HYDROcodone-acetaminophen 5-325 MG per tablet
 QTY:  30 tablet
 Refills:  0
 Commonly known as:  NORCO
 Take 1 tablet by mouth every 4 (four) hours as needed.
  
 insulin aspart 100 UNIT/ML injection
 Refills:  0
 Commonly known as:  NOVOLOG
  
 insulin degludec 100 UNIT/ML injection
 Refills:  0
 Commonly known as:  TRESIBA
  
 isosorbide mononitrate 60 MG 24 hr tablet
 QTY:  30 tablet
 Refills:  1
 Take 1 tablet by mouth daily.
  
 losartan 100 MG tablet
 Refills:  0
 Commonly known as:  COZAAR
  
 nitroGLYCERIN 0.4 MG SL tablet
 QTY:  30 tablet
 Refills:  0
 Doctor's comments:  Hold for SBP less than 100
 Commonly known as:  NITROSTAT
 
 Place 1 tablet under the tongue every 5 (five) minutes as needed for Chest pain.
  
 nortriptyline 25 MG capsule
 Refills:  0
 Commonly known as:  PAMELOR
  
 ondansetron 4 MG disintegrating tablet
 Refills:  0
 Commonly known as:  ZOFRAN-ODT
  
 oxybutynin 5 MG tablet
 Refills:  0
 Commonly known as:  DITROPAN
  
 prochlorperazine 5 MG tablet
 QTY:  60 tablet
 Refills:  11
 Doctor's comments:  Please consider 90 day supplies to promote better adherence
 TAKE ONE TABLET BY MOUTH TWICE DAILY AS NEEDED FOR NAUSEA
  
 rOPINIRole 0.25 MG tablet
 Refills:  0
 Commonly known as:  REQUIP
  
 TRINTELLIX 20 MG Tabs
 Refills:  0
 Generic drug:  Vortioxetine HBr
  
  
 You might also be taking other medications not listed above. If you have questions about an
y of your other medications, talk to the person who prescribed them or your Primary Care Pro
vider. 
  
  
  
  
 Where to Get Your Medications 
  
 Information about where to get these medications is not yet available  
 Ask your nurse or doctor about these medications
  LORazepam 1 MG tablet
  
 
 Disposition:  Home
 Condition:  Stable
 Code Status:  Full Code
 
 Discharge took 35 minutes, to include final examination, discussion of admission, and prepa
ration of prescriptions, instructions for on-going care, follow-up and documentation of disc
harge summary.
 
 ANGELIQUE ISIDRO MD
 2:09 PMin this encounter
 
 Discharge Instructions
 Chandler Jean-Baptiste, [place] - 02/15/2019
 HOME HEALTH
 I certify that Cherie Villalobos is under my care and that I had a face to face encounter on  that meets the physician face to face encounter requirements. I certify that based on m
y findings , the patient is in need of intermittent skilled services and a plan for furnishi
 
ng these services to include, but not limited to the services listed in the questions below:
 
 Primary diagnosis for home health: Frequent Falls.
 Additional diagnosis: left great toe gangrene.
 Services needed: Physical & Occupational therapy.
 Patient is homebound because she cannot leave home without assistance of another individual
 and leaving the home requires a considerable and taxing effort. Patient needs the assistanc
e of another individual to leave home because: she has difficulty with ambulation and transf
ers. 
 Physician assuming care for Home Health services: IVY MONTAGUE
 
 FallPrevention
 Falls often occur due to slipping, tripping or losing your balance. Millions of people fall
 every year and injure themselves.Here are ways to reduce your risk of falling again.
  Think about your fall, was there anything that caused your fall that can be fixed, remov
ed, or replaced?
  Make your home safe by keeping walkways clear of objects you may trip over.
  Use non-slip pads under rugs. Do not use area rugs or small throw rugs.
  Use non-slip mats in bathtubs and showers.
  Install handrails and lights on staircases.
  Do not walk in poorly lit areas.
  Do not stand on chairs or wobbly ladders.
  Use caution when reaching overhead or looking upward.This position can cause a loss of
 balance.
  Be sure your shoes fit properly, have non-slip bottoms and are in good condition.
  Wear shoes both inside and out. Avoid going barefoot or wearing slippers.
  Be cautious when going up and down stairs, curbs, and when walking on uneven sidewalks.
  If your balance is poor, consider using a cane or walker.
  If your fall was related to alcohol use, stop or limit alcohol intake.
  If your fall was related to use of sleeping medicines, talk to your doctor about this.
You may need to reduce your dosage at bedtime if you awaken during the night to go to the [facility].
  To reduce the need for nighttime bathroom trips:
  Avoid drinking fluids for several hours before going to bed
  Empty your bladder before going to bed
  Men can keep a urinal at the bedside
  Stay as active as you can. Balance, flexibility, strength, and endurance all come from e
xercise. They all play a role in preventing falls. Ask your healthcare provider which types 
of activity are right for you.
  Get your vision checked on a regular basis.
  If you have pets, know where they are before you stand up or walk so you don't trip over
 them.
  Use night lights.
 Date Last Reviewed: 2015-2018 [employer]. [address] [place] [address]. All righ
ts reserved. This information is not intended as a substitute for professional medical care.
 Always follow your healthcare professional's instructions.
 
 in this encounter
 
 Medications at Time of Discharge
 
 
+----------------------+----------------------+--------+---------+----------+-----------+
| Medication           | Sig.                 | Disp.  | Refills | Start    | End Date  |
|                      |                      |        |         | Date     |           |
+----------------------+----------------------+--------+---------+----------+-----------+
|   albuterol          | Inhale 2 puffs into  |        |         |          |           |
| (PROVENTIL           | the lungs every 4    |        |         |          |           |
| HFA;VENTOLIN HFA)    | (four) hours as      |        |         |          |           |
 
| 108 (90 Base)        | needed for Wheezing. |        |         |          |           |
| MCG/ACT              |                      |        |         |          |           |
| inhalerIndications:  |                      |        |         |          |           |
| states uses 2x       |                      |        |         |          |           |
| monthly average      |                      |        |         |          |           |
+----------------------+----------------------+--------+---------+----------+-----------+
|   apixaban (ELIQUIS) | Take 1 tablet by     |   60   | 0       | 20 |           |
|  2.5 MG tablet       | mouth 2 (two) times  | tablet |         | 18       |           |
|                      | daily.               |        |         |          |           |
+----------------------+----------------------+--------+---------+----------+-----------+
|   atorvastatin       | Take 80 mg by mouth  |        |         | 20 |           |
| (LIPITOR) 80 MG      | nightly.             |        |         | 19       |           |
| tablet               |                      |        |         |          |           |
+----------------------+----------------------+--------+---------+----------+-----------+
|   carvedilol (COREG) | Take 1 tablet by     |   60   | 0       | 20 |  |
|  12.5 MG tablet      | mouth 2 (two) times  | tablet |         | 19       | 0         |
|                      | daily with meals.    |        |         |          |           |
+----------------------+----------------------+--------+---------+----------+-----------+
|   esomeprazole       | Take 1 capsule by    |        |         |          |           |
| (NEXIUM) 40 MG       | mouth every day      |        |         |          |           |
| capsule              |                      |        |         |          |           |
+----------------------+----------------------+--------+---------+----------+-----------+
|                      | Take 1 tablet by     |   30   | 0       | 20 |           |
| HYDROcodone-acetamin | mouth every 4 (four) | tablet |         | 19       |           |
| ophen (NORCO) 5-325  |  hours as needed.    |        |         |          |           |
| MG per tablet        |                      |        |         |          |           |
+----------------------+----------------------+--------+---------+----------+-----------+
|   insulin aspart     | Inject  into the     |        |         |          |           |
| (NOVOLOG) 100        | skin 3 (three) times |        |         |          |           |
| UNIT/ML injection    |  daily before meals. |        |         |          |           |
|                      |  Sliding scale       |        |         |          |           |
+----------------------+----------------------+--------+---------+----------+-----------+
|   insulin degludec   | Inject 21 units      |        |         |          |           |
| (TRESIBA) 100        | every night          |        |         |          |           |
| UNIT/ML injection    |                      |        |         |          |           |
+----------------------+----------------------+--------+---------+----------+-----------+
|   isosorbide         | Take 1 tablet by     |   30   | 1       | 20 |  |
| mononitrate (IMDUR)  | mouth daily.         | tablet |         | 18       | 9         |
| 60 MG 24 hr tablet   |                      |        |         |          |           |
+----------------------+----------------------+--------+---------+----------+-----------+
|   LORazepam (ATIVAN) | Take 0.5 tablets by  |        |         | 02/15/20 |           |
|  1 MG tablet         | mouth every 8        |        |         | 19       |           |
|                      | (eight) hours as     |        |         |          |           |
|                      | needed for Anxiety.  |        |         |          |           |
|                      | Patient states she   |        |         |          |           |
|                      | has the 1mg tablets  |        |         |          |           |
|                      | at home and that     |        |         |          |           |
|                      | they are scored and  |        |         |          |           |
|                      | she will split them  |        |         |          |           |
|                      | in half              |        |         |          |           |
+----------------------+----------------------+--------+---------+----------+-----------+
|   losartan (COZAAR)  | Take 100 mg by mouth |        |         | 20 |           |
| 100 MG tablet        |  daily.              |        |         | 19       |           |
+----------------------+----------------------+--------+---------+----------+-----------+
|   nitroGLYCERIN      | Place 1 tablet under |   30   | 0       | 20 |  |
| (NITROSTAT) 0.4 MG   |  the tongue every 5  | tablet |         | 19       | 0         |
| SL tablet            | (five) minutes as    |        |         |          |           |
|                      | needed for Chest     |        |         |          |           |
|                      | pain.                |        |         |          |           |
+----------------------+----------------------+--------+---------+----------+-----------+
 
|   nortriptyline      | Take 25-75 mg by     |        |         |          |           |
| (PAMELOR) 25 MG      | mouth See Admin      |        |         |          |           |
| capsule              | Instructions. Takes  |        |         |          |           |
|                      | 25 mg by mouth every |        |         |          |           |
|                      |  morning and 75 mg   |        |         |          |           |
|                      | every night          |        |         |          |           |
+----------------------+----------------------+--------+---------+----------+-----------+
|   ondansetron        | Take 4 mg by mouth   |        |         | 20 |           |
| (ZOFRAN-ODT) 4 MG    | every 8 (eight)      |        |         | 18       |           |
| disintegrating       | hours as needed.     |        |         |          |           |
| tablet               |                      |        |         |          |           |
+----------------------+----------------------+--------+---------+----------+-----------+
|   oxybutynin         | Take 7.5 mg by mouth |        |         |          |           |
| (DITROPAN) 5 MG      |  2 (two) times       |        |         |          |           |
| tablet               | daily.               |        |         |          |           |
+----------------------+----------------------+--------+---------+----------+-----------+
|   prochlorperazine 5 | TAKE ONE TABLET BY   |   60   | 11      | 20 |           |
|  MG tablet           | MOUTH TWICE DAILY AS | tablet |         | 19       |           |
|                      |  NEEDED FOR NAUSEA   |        |         |          |           |
+----------------------+----------------------+--------+---------+----------+-----------+
|   rOPINIRole         | Take 0.75 mg by      |        |         |          |           |
| (REQUIP) 0.25 MG     | mouth nightly.       |        |         |          |           |
| tablet               |                      |        |         |          |           |
+----------------------+----------------------+--------+---------+----------+-----------+
|   TRINTELLIX 20 MG   | Take 20 mg by mouth  |        |         | 20 |           |
| TABS                 | every evening.       |        |         | 19       |           |
+----------------------+----------------------+--------+---------+----------+-----------+
 as of this encounter
 
 Progress Notes
 Hortencia Vela RD - 02/15/2019  3:02 PM PSTFormatting of this note may be different from the
 original.
 
  02/15/19 1430 
 Subjective 
 Timepoint Admit
 (Consult - poor appetite) 
 Pt c/o Pt reports she is discharging today.  PO intake much improved.  Pt reports she plans
 on getting set up with a meal service.  Also noted that if she purchases food, caregiver wi
ll prepare meals for her.  Pt states she will eat anything if it prepared for her.  States s
he is discharging and has no needs today 
 Diet Experience 
 Self-selected diet(s) followed Pt reports she sees her RD at [facility] monthly.  She is not on
 a renal diet at this time due to poor po intake.  Pt has been eating a lot of soups even th
ough high in sodium.  Has protein shakes at home, but hasn't been drinking because she read 
an article about a boy who drank too many and  and now she is afraid to drink.   
 Food Intake 
 Amount of Food Pt reports she is eating >50% of meals 
 Type of Food / Meals Renal cardiac  
 Biochemical data, medical tests, and procedures reviewed 
 Biochemical data, medical tests, and procedures reviewed K WNL 
 Recommendations 
 Recommended energy needs Encourage po intake.  Discussed safe use with oral nutrition suppl
ements.  Will follow with RD at [facility].  Monitor and follow as indicated.   
 Nutritional Risk 
 Nutritional risk Moderate 
 Follow up date 19 
 Michael David MD - 02/15/2019  2:11 PM PSTFormatting of this note may be different f
rom the original.
 Infectious Disease
 
 Progress Note
 
 Hospital Day:   LOS: 0 days 
 SUBJECTIVE
 No acute event overnight.  No new complaint.  No fever.
 
 Antibiotics:IV vancomycin was given yesterday x1
 
 Allergy:
 Allergies 
 Allergen Reactions 
   Quinapril Hcl Anaphylaxis 
   Digoxin And Related Other (See Comments) 
   toxic 
   Metaxalone Other (See Comments) 
   Morphine Mental Changes 
   Make her crazy/ "funny feeling in my head"
 Has used hydromorphone in-house 
   Pantoprazole Sodium Nausea and Vomiting 
   Penicillins Rash 
   Tolerates cephalosporins 
   Quinapril Hcl Edema 
   Facial Edema 
   Sudafed [Pseudoephedrine Hcl] Rash 
 
 OBJECTIVE
 Vital Signs:
 /57 (BP Location: Right upper arm)  | Pulse 66  | Temp 98 F (36.7 C) (Oral)  | Re
sp 18  | Ht 1.778 m (5' 10")  | Wt 65.3 kg (144 lb)  | SpO2 96%  | Breastfeeding? No  | BMI 
20.66 kg/m 
 
 Examination:
 
 Constitutional: Resting in the recliner, not in acute distress.She is alert, awake, oriente
d well.
 HEENT: 
 Head: Normocephalic and Atraumatic. 
 Nose: Nose normal. 
 Neck: Neck supple.No palpable mass
 Cardiovascular: Not appreciate rubs or gallops
 Pulmonary/Chest:  Clear to auscultation bilaterally.
 Abdominal: Soft, bowel sounds are present, no distension, no ascites. 
 Musculoskeletal: Right great toe slightly swollen, there is a small skin tear with no surro
unding erythema or purulent drainage.Left TMA stump heals nicely with no evidence of infecti
on.
 Neurological:  No focal neurologic deficits. 
 Skin: Skin is warm and dry. No rash noted. 
 Psychiatric: Alert, oriented well.  No agitation.
 
 LABS:
 Recent Results (from the past 24 hour(s)) 
 Septic Lactic Acid 
  Collection Time: 19  4:55 PM 
 Result Value Ref Range 
  LACTIC ACID 2.7 (H) 0.4 - 2.0 mmol/L 
 C-Reactive Protein 
  Collection Time: 19  5:39 PM 
 Result Value Ref Range 
  CRP 0.8 (H) <0.5 mg/dL 
 CBC with differential 
 
  Collection Time: 19  5:39 PM 
 Result Value Ref Range 
  WBC 5.14 3.80 - 11.00 K/uL 
  RBC 2.91 (L) 3.70 - 5.10 M/uL 
  HGB 10.1 (L) 11.3 - 15.5 g/dL 
  HCT 30.9 (L) 34.0 - 46.0 % 
  .1 (H) 80.0 - 100.0 fl 
  MCH 34.8 (H) 27.0 - 34.0 pg 
  MCHC 32.8 32.0 - 35.5 g/dL 
  RDW SD 61.3 (H) 37 - 53 fl 
   (L) 150 - 400 K/uL 
  MPV 9.3 fl 
  DIFF TYPE AUTOMATED  
  NEUTROPHILS 74.03 % 
  LYMPHOCYTES 20.20 % 
  MONOCYTES 5.16 % 
  EOSINOPHILS 0.03 % 
  BASOPHILS 0.58 % 
  NEUTROPHILS ABS 3.81 1.90 - 7.40 K/uL 
  LYMPHOCYTES ABS 1.04 1.00 - 3.90 K/uL 
  MONOCYTES ABS 0.27 0.00 - 0.80 K/uL 
  EOSINOPHILS ABS 0.00 0.00 - 0.50 K/uL 
  BASOPHILS ABS 0.03 0.00 - 0.10 K/uL 
  MORPHOLOGY 1+  
  Platelet Estimate DECREASED  
 Comprehensive metabolic panel 
  Collection Time: 19  5:39 PM 
 Result Value Ref Range 
  SODIUM 143 135 - 145 mmol/L 
  POTASSIUM 4.7 3.5 - 4.9 mmol/L 
  CHLORIDE 96 (L) 99 - 109 mmol/L 
  CO2 >40 (HH) 23 - 32 mmol/L 
  ANION GAP AGAP UNABLE TO CALCULATE 5 - 20 mmol/L 
  GLUCOSE 160 (H) 65 - 99 mg/dL 
  BUN 9 8 - 25 mg/dL 
  CREATININE 2.96 (H) 0.50 - 1.00 mg/dL 
  BUN/CREAT 3  
  CALCIUM 9.4 8.5 - 10.5 mg/dL 
  TOTAL PROTEIN 6.7 6.3 - 8.2 g/dL 
  Albumin 4.3 3.3 - 4.8 g/dL 
  GLOBULIN 2.4 1.3 - 4.9 g/dL 
  A/G 1.8 1.0 - 2.4 
  TBIL 0.5 0.1 - 1.5 mg/dL 
  ALK PHOS 103 35 - 115 U/L 
  AST 54 (H) 10 - 45 U/L 
  ALT 40 10 - 65 U/L 
  EGFR 16 (L) >60 mL/min/1.73m2 
 Sedimentation Rate (ESR) 
  Collection Time: 19  5:39 PM 
 Result Value Ref Range 
  ESR 13 0 - 30 mm/Hr 
 Vitamin B12 
  Collection Time: 19  5:39 PM 
 Result Value Ref Range 
  VITAMIN B12 553 254 - 1,320 pg/mL 
 Folate 
  Collection Time: 19  5:39 PM 
 Result Value Ref Range 
  FOLATE 8.0 >5.4 ng/mL 
 TSH 
 
  Collection Time: 19  5:39 PM 
 Result Value Ref Range 
  TSH 0.904 0.450 - 5.100 uIU/mL 
 Septic Lactic Acid 
  Collection Time: 19  7:22 PM 
 Result Value Ref Range 
  LACTIC ACID 2.7 (H) 0.4 - 2.0 mmol/L 
 Septic Lactic Acid 
  Collection Time: 19 10:12 PM 
 Result Value Ref Range 
  LACTIC ACID 2.0 0.4 - 2.0 mmol/L 
 POCT glucose 
  Collection Time: 19 10:32 PM 
 Result Value Ref Range 
  GLUCOSE,POC SCREEN 202 (H) 65 - 99 mg/dL 
 POCT glucose 
  Collection Time: 02/15/19  6:01 AM 
 Result Value Ref Range 
  GLUCOSE,POC SCREEN 108 (H) 65 - 99 mg/dL 
 POCT glucose 
  Collection Time: 02/15/19 11:36 AM 
 Result Value Ref Range 
  GLUCOSE,POC SCREEN 231 (H) 65 - 99 mg/dL 
 
 Results  
  Procedure Component Value Units Date/Time 
  Blood Culture Set 2 [[id number]] Collected:  19 1739 
  Specimen:  Blood from Blood, peripheral draw Updated:  19 
  Blood Culture Set 1 [[id number]] Collected:  19 1655 
  Specimen:  Blood from Blood Line Draw Updated:  19 
  
 
 ASSESSMENT & PLAN
 This is a 68-year-old female with multiple comorbidities including End-stage renal disease,
 on regular hemodialysis via fistula at the left upper extremity.  History of severe periphe
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
A resection .  She completed the course of IV antibiotic and the wound is healing nicely wit
h no evidence of ongoing infection.  
 She was admitted at this time for frequent fall at home and noted to have right great toe f
racture, base of distal phalanx.  No intervention recommended at this point.  No evidence of
 ongoing skin and soft tissue infection at present.  Would agree to discharge home and follo
w-up with podiatrist.  Continue to off antibiotic per infectious disease standpoint
 
 Plan discussed with patient at length.  She can follow-up with infectious disease as needed
 if concern for recurrent infection.  Plan discussed with medicine team. Thank you for consu
herb David MD
 2/15/2019
 
 in this encounter
 
 Plan of Treatment
 
 
+--------+---------+-----------+----------------------+-------------+
| Date   | Type    | Specialty | Care Team            | Description |
+--------+---------+-----------+----------------------+-------------+
| 04/10/ | Office  | Podiatry  |   Srinivasan Abdi,  |             |
|    | Visit   |           | DPM  [address] [facility], |             |
 
|        |         |           |  [address]  [place],  |             |
|        |         |           | [place] [address]             |             |
|        |         |           | [phone]         |             |
|        |         |           | [phone] (Fax)   |             |
+--------+---------+-----------+----------------------+-------------+
 
 
 
+-------------------------+--------+----------------------+---------------------+
| Name                    | Priori | Associated Diagnoses | Order Schedule      |
|                         | ty     |                      |                     |
+-------------------------+--------+----------------------+---------------------+
| Referral to Home Health | Routin |   Fall in home,      | Ordered: 02/15/2019 |
|                         | e      | initial encounter    |                     |
+-------------------------+--------+----------------------+---------------------+
 as of this encounter
 
 Procedures
 
 
+----------------------+--------+-------------+----------------------+----------------------
+
| Procedure Name       | Priori | Date/Time   | Associated Diagnosis | Comments             
|
|                      | ty     |             |                      |                      
|
+----------------------+--------+-------------+----------------------+----------------------
+
| POCT GLUCOSE         | Routin | 02/15/2019  |                      |   Results for this   
|
|                      | e      | 11:36 AM    |                      | procedure are in the 
|
|                      |        | PST         |                      |  results section.    
|
+----------------------+--------+-------------+----------------------+----------------------
+
| POCT GLUCOSE         | Routin | 02/15/2019  |                      |   Results for this   
|
|                      | e      |  6:01 AM    |                      | procedure are in the 
|
|                      |        | PST         |                      |  results section.    
|
+----------------------+--------+-------------+----------------------+----------------------
+
| POCT GLUCOSE         | Routin | 2019  |                      |   Results for this   
|
|                      | e      | 10:32 PM    |                      | procedure are in the 
|
|                      |        | PST         |                      |  results section.    
|
+----------------------+--------+-------------+----------------------+----------------------
+
| [facility] SEPT[facility]ACT[facility]   | STAT   | 2019  |                      |   Results for this   
|
| ACID                 |        | 10:12 PM    |                      | procedure are in the 
|
|                      |        | PST         |                      |  results section.    
|
+----------------------+--------+-------------+----------------------+----------------------
+
 
| [facility]PT[facility]ACT[facility]   | STAT   | 2019  |                      |   Results for this   
|
| ACID                 |        |  7:22 PM    |                      | procedure are in the 
|
|                      |        | PST         |                      |  results section.    
|
+----------------------+--------+-------------+----------------------+----------------------
+
| BLOOD CULTURE, SET 2 | STAT   | 2019  |                      |   Results for this   
|
|                      |        |  5:39 PM    |                      | procedure are in the 
|
|                      |        | PST         |                      |  results section.    
|
+----------------------+--------+-------------+----------------------+----------------------
+
| SEDIMENTATION RATE,  | STAT   | 2019  |                      |   Results for this   
|
| AUTOMATED            |        |  5:39 PM    |                      | procedure are in the 
|
|                      |        | PST         |                      |  results section.    
|
+----------------------+--------+-------------+----------------------+----------------------
+
| CBC W/AUTO DIFF      | STAT   | 2019  |                      |   Results for this   
|
| (REFLEX TO MANUAL)   |        |  5:39 PM    |                      | procedure are in the 
|
|                      |        | PST         |                      |  results section.    
|
+----------------------+--------+-------------+----------------------+----------------------
+
| C-REACTIVE PROTEIN   | STAT   | 2019  |                      |   Results for this   
|
|                      |        |  5:39 PM    |                      | procedure are in the 
|
|                      |        | PST         |                      |  results section.    
|
+----------------------+--------+-------------+----------------------+----------------------
+
| TSH                  | STAT   | 2019  |                      |   Results for this   
|
|                      |        |  5:39 PM    |                      | procedure are in the 
|
|                      |        | PST         |                      |  results section.    
|
+----------------------+--------+-------------+----------------------+----------------------
+
| FOLATE               | Add-On | 2019  |                      |   Results for this   
|
|                      |        |  5:39 PM    |                      | procedure are in the 
|
|                      |        | PST         |                      |  results section.    
|
+----------------------+--------+-------------+----------------------+----------------------
+
| VITAMIN B12          | Add-On | 2019  |                      |   Results for this   
|
|                      |        |  5:39 PM    |                      | procedure are in the 
|
 
|                      |        | PST         |                      |  results section.    
|
+----------------------+--------+-------------+----------------------+----------------------
+
| COMPREHENSIVE        | STAT   | 2019  |                      |   Results for this   
|
| METABOLIC PANEL      |        |  5:39 PM    |                      | procedure are in the 
|
|                      |        | PST         |                      |  results section.    
|
+----------------------+--------+-------------+----------------------+----------------------
+
| XR TOES RIGHT        | ASAP   | 2019  |                      |   Results for this   
|
|                      |        |  5:08 PM    |                      | procedure are in the 
|
|                      |        | PST         |                      |  results section.    
|
+----------------------+--------+-------------+----------------------+----------------------
+
| [facility]PT[facility]ACT[facility]   | STAT   | 2019  |                      |   Results for this   
|
| ACID                 |        |  4:55 PM    |                      | procedure are in the 
|
|                      |        | PST         |                      |  results section.    
|
+----------------------+--------+-------------+----------------------+----------------------
+
| BLOOD CULTURE, SET 1 | STAT   | 2019  |                      |   Results for this   
|
|                      |        |  4:55 PM    |                      | procedure are in the 
|
|                      |        | PST         |                      |  results section.    
|
+----------------------+--------+-------------+----------------------+----------------------
+
| ED INFORMATION       | Routin | 2019  |                      |   Results for this   
|
| EXCHANGE             | e      |  3:21 PM    |                      | procedure are in the 
|
|                      |        | PST         |                      |  results section.    
|
+----------------------+--------+-------------+----------------------+----------------------
+
 in this encounter
 
 Results
 POCT glucose (02/15/2019 11:36 AM)
 
+--------------------+--------------------------+---------------+-----------------+
| Component          | Value                    | Ref Range     | Performed At    |
+--------------------+--------------------------+---------------+-----------------+
| GLUCOSE,POC SCREEN | 231 (H)Comment: Testing  | 65 - 99 mg/dL | [facility] |
|                    | performed at [facility];[address]     |               |                 |
|                    | [address];[place]   |               |                 |
|                    | [id number]                    |               |                 |
+--------------------+--------------------------+---------------+-----------------+
 
 
 
 
+-------------------+------------------+--------------------+--------------+
| Performing        | Address          | City/State/Zipcode | Phone Number |
| Organization      |                  |                    |              |
+-------------------+------------------+--------------------+--------------+
|   [facility] LABORATORY |   [address] | [place] [address] |              |
+-------------------+------------------+--------------------+--------------+
 POCT glucose (02/15/2019  6:01 AM)
 
+--------------------+--------------------------+---------------+-----------------+
| Component          | Value                    | Ref Range     | Performed At    |
+--------------------+--------------------------+---------------+-----------------+
| GLUCOSE,POC SCREEN | 108 (H)Comment: Testing  | 65 - 99 mg/dL | [facility] LABORATORY |
|                    | performed at [facility];[address]     |               |                 |
|                    | [address];[place]   |               |                 |
|                    | [id number]                    |               |                 |
+--------------------+--------------------------+---------------+-----------------+
 
 
 
+-------------------+------------------+--------------------+--------------+
| Performing        | Address          | City/State/Zipcode | Phone Number |
| Organization      |                  |                    |              |
+-------------------+------------------+--------------------+--------------+
|   [facility] LABORATORY |   [address] | [place] [address] |              |
+-------------------+------------------+--------------------+--------------+
 POCT glucose (2019 10:32 PM)
 
+--------------------+--------------------------+---------------+-----------------+
| Component          | Value                    | Ref Range     | Performed At    |
+--------------------+--------------------------+---------------+-----------------+
| GLUCOSE,POC SCREEN | 202 (H)Comment: Testing  | 65 - 99 mg/dL | [facility] LABORATORY |
|                    | performed at [facility];[address]     |               |                 |
|                    | [address];[place]   |               |                 |
|                    | [id number]                    |               |                 |
+--------------------+--------------------------+---------------+-----------------+
 
 
 
+-------------------+------------------+--------------------+--------------+
| Performing        | Address          | City/State/Zipcode | Phone Number |
| Organization      |                  |                    |              |
+-------------------+------------------+--------------------+--------------+
|   [facility] LABORAT[facility] |   [address] | [place] [address] |              |
+-------------------+------------------+--------------------+--------------+
 Septic Lactic Acid (2019 10:12 PM)
 
+-------------+-------------------------+------------------+-----------------+
| Component   | Value                   | Ref Range        | Performed At    |
+-------------+-------------------------+------------------+-----------------+
| LACTIC ACID | 2.0Comment: Testing     | 0.4 - 2.0 mmol/L | [facility] LABORATORY |
|             | performed at [facility]    |                  |                 |
|             | [facility], [place]  |                  |                 |
|             | [address]                   |                  |                 |
+-------------+-------------------------+------------------+-----------------+
 
 
 
+-------------------+------------------+--------------------+--------------+
| Performing        | Address          | City/State/Zipcode | Phone Number |
| Organization      |                  |                    |              |
 
+-------------------+------------------+--------------------+--------------+
|   [facility] LABORAT[facility] |   [address] | [place] [address] |              |
+-------------------+------------------+--------------------+--------------+
 Septic Lactic Acid (2019  7:22 PM)
 
+-------------+--------------------------+------------------+-----------------+
| Component   | Value                    | Ref Range        | Performed At    |
+-------------+--------------------------+------------------+-----------------+
| LACTIC ACID | 2.7 (H)Comment: Testing  | 0.4 - 2.0 mmol/L | [facility] LABORAT[facility] |
|             | performed at [facility];[address]     |                  |                 |
|             | [address];[place]   |                  |                 |
|             | [address]                    |                  |                 |
+-------------+--------------------------+------------------+-----------------+
 
 
 
+-------------------+------------------+--------------------+--------------+
| Performing        | Address          | City/State/Zipcode | Phone Number |
| Organization      |                  |                    |              |
+-------------------+------------------+--------------------+--------------+
|   [facility] LABORAT[facility] |   [address] | [place] [address] |              |
+-------------------+------------------+--------------------+--------------+
 TSH (2019  5:39 PM)
 
+-----------+-------------------------+----------------------+-----------------+
| Component | Value                   | Ref Range            | Performed At    |
+-----------+-------------------------+----------------------+-----------------+
| TSH       | 0.904Comment: Testing   | 0.450 - 5.100 uIU/mL | [facility] LABORAT[facility] |
|           | performed at [facility];[address]    |                      |                 |
|           | [address];[place]  |                      |                 |
|           | [address]                   |                      |                 |
+-----------+-------------------------+----------------------+-----------------+
 
 
 
+----------+
| Specimen |
+----------+
| Blood    |
+----------+
 
 
 
+-------------------+------------------+--------------------+--------------+
| Performing        | Address          | City/State/Zipcode | Phone Number |
| Organization      |                  |                    |              |
+-------------------+------------------+--------------------+--------------+
|   [facility] LABORAT[facility] |   [address] | [place] [address] |              |
+-------------------+------------------+--------------------+--------------+
 Folate (2019  5:39 PM)
 
+-----------+--------------------------+------------+--------------+
| Component | Value                    | Ref Range  | Performed At |
+-----------+--------------------------+------------+--------------+
| FOLATE    | 8.0Comment: Testing      | >5.4 ng/mL | [facility]   |
|           | performed at [facility], [address] |            | LABORATORY   |
|           |  [facility],        |            |              |
|           | [place]  [address]     |            |              |
+-----------+--------------------------+------------+--------------+
 
 
 
 
+----------+
| Specimen |
+----------+
| Blood    |
+----------+
 
 
 
+---------------+-------------------------+---------------------+----------------+
| Performing    | Address                 | City/State/Zipcode  | Phone Number   |
| Organization  |                         |                     |                |
+---------------+-------------------------+---------------------+----------------+
|   [facility]  |   [address] [place] [address]  | [place] [address] |   [phone] |
| LABORATORY    | [address]                   |                     |                |
+---------------+-------------------------+---------------------+----------------+
 Vitamin B12 (2019  5:39 PM)
 
+-------------+--------------------------+-------------------+--------------+
| Component   | Value                    | Ref Range         | Performed At |
+-------------+--------------------------+-------------------+--------------+
| VITAMIN B12 | 553Comment: Testing      | 254 - 1,320 pg/mL | [facility]   |
|             | performed at [facility], [address] |                   | LABORATORY   |
|             |  [address],        |                   |              |
|             | [place]  [address]     |                   |              |
+-------------+--------------------------+-------------------+--------------+
 
 
 
+----------+
| Specimen |
+----------+
| Blood    |
+----------+
 
 
 
+---------------+-------------------------+---------------------+----------------+
| Performing    | Address                 | City/State/Zipcode  | Phone Number   |
| Organization  |                         |                     |                |
+---------------+-------------------------+---------------------+----------------+
|   [facility]  |   [address] [facility]  | [place] [address] |   [phone] |
| LABORATORY    | [address]                   |                     |                |
+---------------+-------------------------+---------------------+----------------+
 Blood Culture Set 2 (2019  5:39 PM)
 
+----------------------+------------------------+-----------+-----------------+
| Component            | Value                  | Ref Range | Performed At    |
+----------------------+------------------------+-----------+-----------------+
| Specimen Description | BLOOD, PERIPHERAL DRAW |           | [facility]      |
|                      |                        |           | LABORATORY      |
+----------------------+------------------------+-----------+-----------------+
| SPECIAL REQUESTS     | R HAND                 |           | [facility] |
+----------------------+------------------------+-----------+-----------------+
| CULTURE              | NO GROWTH 6 DAYS       |           | [facility]      |
|                      |                        |           | LABORATORY      |
+----------------------+------------------------+-----------+-----------------+
 
 
 
 
+-----------------+
| Specimen        |
+-----------------+
| Blood - Blood,  |
| peripheral draw |
+-----------------+
 
 
 
+-------------------+-------------------------+---------------------+----------------+
| Performing        | Address                 | City/State/Zipcode  | Phone Number   |
| Organization      |                         |                     |                |
+-------------------+-------------------------+---------------------+----------------+
|   [facility]      |   [address]  | [place] [address] |   [phone] |
| LABORATORY        | [address]                   |                     |                |
+-------------------+-------------------------+---------------------+----------------+
|   [facility]OR[facility]ORY |   [address]        | [place] [address]  |                |
+-------------------+-------------------------+---------------------+----------------+
 Sedimentation Rate (ESR) (2019  5:39 PM)
 
+-----------+-------------------------+--------------+-----------------+
| Component | Value                   | Ref Range    | Performed At    |
+-----------+-------------------------+--------------+-----------------+
| ESR       | 13Comment: Testing      | 0 - 30 mm/Hr | [facility]AT[facility] |
|           | performed at [facility];[address]    |              |                 |
|           | [address];[place]  |              |                 |
|           | [address]                   |              |                 |
+-----------+-------------------------+--------------+-----------------+
 
 
 
+----------+
| Specimen |
+----------+
| Blood    |
+----------+
 
 
 
+-------------------+------------------+--------------------+--------------+
| Performing        | Address          | City/State/Zipcode | Phone Number |
| Organization      |                  |                    |              |
+-------------------+------------------+--------------------+--------------+
|   [facility] LAB[facility] |   [address] | [place] [address] |              |
+-------------------+------------------+--------------------+--------------+
 Comprehensive metabolic panel (2019  5:39 PM)
 
+----------------+--------------------------+-------------------+-----------------+
| Component      | Value                    | Ref Range         | Performed At    |
+----------------+--------------------------+-------------------+-----------------+
| SODIUM         | 143                      | 135 - 145 mmol/L  | [facility] LABORAT[facility] |
+----------------+--------------------------+-------------------+-----------------+
| POTASSIUM      | 4.7                      | 3.5 - 4.9 mmol/L  | [facility] |
+----------------+--------------------------+-------------------+-----------------+
| CHLORIDE       | 96 (L)                   | 99 - 109 mmol/L   | [facility] |
+----------------+--------------------------+-------------------+-----------------+
| CO2            | >40 (HH)Comment: CALLED  | 23 - 32 mmol/L    | [facility] LABORATORY |
|                | NURSING UNITREAD BACK    |                   |                 |
|                | RESULTS VERIFIEDCALLED   |                   |                 |
 
|                | TO RN IN ED AT 1830 BY   |                   |                 |
|                | LGJ                      |                   |                 |
|                |                          |                   |                 |
+----------------+--------------------------+-------------------+-----------------+
| ANION GAP AGAP | UNABLE TO CALCULATE      | 5 - 20 mmol/L     | [facility] |
+----------------+--------------------------+-------------------+-----------------+
| GLUCOSE        | 160 (H)                  | 65 - 99 mg/dL     | [facility] |
+----------------+--------------------------+-------------------+-----------------+
| BUN            | 9                        | 8 - 25 mg/dL      | [facility] |
+----------------+--------------------------+-------------------+-----------------+
| CREATININE     | 2.96 (H)                 | 0.50 - 1.00 mg/dL | [facility] |
+----------------+--------------------------+-------------------+-----------------+
| BUN/CREAT      | 3                        |                   | [facility] |
+----------------+--------------------------+-------------------+-----------------+
| CALCIUM        | 9.4                      | 8.5 - 10.5 mg/dL  | [facility] |
+----------------+--------------------------+-------------------+-----------------+
| TOTAL PROTEIN  | 6.7                      | 6.3 - 8.2 g/dL    | [facility] |
+----------------+--------------------------+-------------------+-----------------+
| Albumin        | 4.3                      | 3.3 - 4.8 g/dL    | [facility] |
+----------------+--------------------------+-------------------+-----------------+
| GLOBULIN       | 2.4                      | 1.3 - 4.9 g/dL    | [facility] |
+----------------+--------------------------+-------------------+-----------------+
| A/G            | 1.8                      | 1.0 - 2.4         | [facility] |
+----------------+--------------------------+-------------------+-----------------+
| TBIL           | 0.5                      | 0.1 - 1.5 mg/dL   | [facility] |
+----------------+--------------------------+-------------------+-----------------+
| ALK PHOS       | 103                      | 35 - 115 U/L      | [facility] |
+----------------+--------------------------+-------------------+-----------------+
| AST            | 54 (H)                   | 10 - 45 U/L       | [facility] |
+----------------+--------------------------+-------------------+-----------------+
| ALT            | 40                       | 10 - 65 U/L       | [facility] |
+----------------+--------------------------+-------------------+-----------------+
| EGFR           | 16 (L)Comment: GFR <60:  | >60 mL/min/1.73m2 | [facility] |
|                | CHRONIC KIDNEY DISEASE,  |                   |                 |
|                | IF FOUND OVER A 3 MONTH  |                   |                 |
|                | PERIOD.GFR <15: KIDNEY   |                   |                 |
|                | FAILURE.FOR       |                   |                 |
|                | [employer]IC[employer], MULTIPLY THE  |                   |                 |
|                | CALCULATED GFR BY        |                   |                 |
|                | 1.210.This eGFR is       |                   |                 |
|                | calculated using the     |                   |                 |
|                | MDRD IDMS traceable      |                   |                 |
|                | equation.Testing         |                   |                 |
|                | performed at [facility];[address]     |                   |                 |
|                | [facility];[place], [place]   |                   |                 |
|                | [address]                    |                   |                 |
+----------------+--------------------------+-------------------+-----------------+
 
 
 
+----------+
| Specimen |
+----------+
| Blood    |
+----------+
 
 
 
+-------------------+------------------+--------------------+--------------+
| Performing        | Address          | City/State/Zipcode | Phone Number |
 
| Organization      |                  |                    |              |
+-------------------+------------------+--------------------+--------------+
|   [facility] |   [address] | [place] [address] |              |
+-------------------+------------------+--------------------+--------------+
 CBC with differential (2019  5:39 PM)
 
+-------------------+------------------------+-------------------+-----------------+
| Component         | Value                  | Ref Range         | Performed At    |
+-------------------+------------------------+-------------------+-----------------+
| WBC               | 5.14                   | 3.80 - 11.00 K/uL | [facility] |
+-------------------+------------------------+-------------------+-----------------+
| RBC               | 2.91 (L)               | 3.70 - 5.10 M/uL  | [facility] |
+-------------------+------------------------+-------------------+-----------------+
| HGB               | 10.1 (L)               | 11.3 - 15.5 g/dL  | [facility] |
+-------------------+------------------------+-------------------+-----------------+
| HCT               | 30.9 (L)               | 34.0 - 46.0 %     | [facility] |
+-------------------+------------------------+-------------------+-----------------+
| MCV               | 106.1 (H)              | 80.0 - 100.0 fl   | [facility] |
+-------------------+------------------------+-------------------+-----------------+
| MCH               | 34.8 (H)               | 27.0 - 34.0 pg    | [facility] |
+-------------------+------------------------+-------------------+-----------------+
| MCHC              | 32.8                   | 32.0 - 35.5 g/dL  | [facility] |
+-------------------+------------------------+-------------------+-----------------+
| RDW SD            | 61.3 (H)               | 37 - 53 fl        | [facility] |
+-------------------+------------------------+-------------------+-----------------+
| PLT               | 145 (L)                | 150 - 400 K/uL    | [facility] |
+-------------------+------------------------+-------------------+-----------------+
| MPV               | 9.3                    | fl                | [facility] |
+-------------------+------------------------+-------------------+-----------------+
| DIFF TYPE         | AUTOMATED              |                   | [facility] |
+-------------------+------------------------+-------------------+-----------------+
| NEUTROPHILS       | 74.03                  | %                 | [facility] |
+-------------------+------------------------+-------------------+-----------------+
| LYMPHOCYTES       | 20.20                  | %                 | [facility] |
+-------------------+------------------------+-------------------+-----------------+
| MONOCYTES         | 5.16                   | %                 | [facility] |
+-------------------+------------------------+-------------------+-----------------+
| EOSINOPHILS       | 0.03                   | %                 | [facility] |
+-------------------+------------------------+-------------------+-----------------+
| BASOPHILS         | 0.58                   | %                 | [facility] |
+-------------------+------------------------+-------------------+-----------------+
| NEUTROPHILS ABS   | 3.81                   | 1.90 - 7.40 K/uL  | [facility] |
+-------------------+------------------------+-------------------+-----------------+
| LYMPHOCYTES ABS   | 1.04                   | 1.00 - 3.90 K/uL  | [facility] |
+-------------------+------------------------+-------------------+-----------------+
| MONOCYTES ABS     | 0.27                   | 0.00 - 0.80 K/uL  | [facility] |
+-------------------+------------------------+-------------------+-----------------+
| EOSINOPHILS ABS   | 0.00                   | 0.00 - 0.50 K/uL  | [facility] |
+-------------------+------------------------+-------------------+-----------------+
| BASOPHILS ABS     | 0.03                   | 0.00 - 0.10 K/uL  | [facility] |
+-------------------+------------------------+-------------------+-----------------+
| MORPHOLOGY        | 1+                     |                   | [facility] |
|                   | Comment:               |                   |                 |
|                   | ANISO                  |                   |                 |
|                   | 1+                     |                   |                 |
|                   | MACRO                  |                   |                 |
|                   | NORMAL PLT MORPH       |                   |                 |
|                   |                        |                   |                 |
+-------------------+------------------------+-------------------+-----------------+
| Platelet Estimate | DECREASEDComment:      |                   | [facility] |
 
|                   | Testing performed at   |                   |                 |
|                   | [facility];[address] [place] [address]          |                   |                 |
|                   | [place] [address] |                   |                 |
+-------------------+------------------------+-------------------+-----------------+
 
 
 
+----------+
| Specimen |
+----------+
| Blood    |
+----------+
 
 
 
+-------------------+------------------+--------------------+--------------+
| Performing        | Address          | City/State/Zipcode | Phone Number |
| Organization      |                  |                    |              |
+-------------------+------------------+--------------------+--------------+
|   [facility] |   [address] | [place] [address] |              |
+-------------------+------------------+--------------------+--------------+
 C-Reactive Protein (2019  5:39 PM)
 
+-----------+--------------------------+------------+-----------------+
| Component | Value                    | Ref Range  | Performed At    |
+-----------+--------------------------+------------+-----------------+
| CRP       | 0.8 (H)Comment: Testing  | <0.5 mg/dL | [facility] LABORATORY |
|           | performed at [facility];[address]     |            |                 |
|           | [address];[place]   |            |                 |
|           | [id number]                    |            |                 |
+-----------+--------------------------+------------+-----------------+
 
 
 
+----------+
| Specimen |
+----------+
| Blood    |
+----------+
 
 
 
+-------------------+------------------+--------------------+--------------+
| Performing        | Address          | City/State/Zipcode | Phone Number |
| Organization      |                  |                    |              |
+-------------------+------------------+--------------------+--------------+
|   [facility] LABORATORY |   [address] | [place] [address] |              |
+-------------------+------------------+--------------------+--------------+
 XR Toe Right 2 View (2019  5:08 PM)
 
+----------------------------------------------------------------------+--------------+
| Impressions                                                          | Performed At |
+----------------------------------------------------------------------+--------------+
|   No radiographic evidence of osteomyelitis seen.    If further      |   [facility]     |
| assessment  is warranted, consider correlation with MRI.  Potential  | RADIOLOGY    |
| acute fracture through the base of the distal phalanx.  Signed by:   |              |
| Gavin South  Sign Date/Time: 2019 5:15 PM                      |              |
+----------------------------------------------------------------------+--------------+
 
 
 
 
+------------------------------------------------------------------------+--------------+
| Narrative                                                              | Performed At |
+------------------------------------------------------------------------+--------------+
|   RIGHT TOE  CLINICAL INFORMATION:  Other (see comments) Pain, concern |   [facility]     |
|  for osteomyelitis.  COMPARISON:  XR FOOT LEFT (2018); XR FOOT   | RADIOLOGY    |
| LEFT (2018); XR FOOT LEFT  (2018);  FINDINGS:  Advanced     |              |
| degenerative changes identified involving the interphalangeal  joint   |              |
| of the 1st digit.    On the lateral view, there appears to be  lucency |              |
|  through the base of the phalanx, potentially reflecting an  acute     |              |
| fracture.    No erosive or destructive changes appreciated to  suggest |              |
|  osteomyelitis.                                                        |              |
+------------------------------------------------------------------------+--------------+
 
 
 
+------------------------------------------------------------------------+
| Procedure Note                                                         |
+------------------------------------------------------------------------+
|   Denny, Rad Results In - 2019  5:18 PM PST  RIGHT TOE             |
| CLINICAL INFORMATION:                                                  |
| Other (see comments) Pain, concern for osteomyelitis.                  |
| COMPARISON:                                                            |
| XR FOOT LEFT (2018); XR FOOT LEFT (2018); XR FOOT LEFT     |
| (2018);                                                           |
| FINDINGS:                                                              |
| Advanced degenerative changes identified involving the interphalangeal |
| joint of the 1st digit.  On the lateral view, there appears to be      |
| lucency through the base of the phalanx, potentially reflecting an     |
| acute fracture.  No erosive or destructive changes appreciated to      |
| suggest osteomyelitis.                                                 |
| IMPRESSION:                                                            |
| No radiographic evidence of osteomyelitis seen.  If further assessment |
| is warranted, consider correlation with MRI.                           |
| Potential acute fracture through the base of the distal phalanx.       |
| Signed by: Gavin South                                                 |
| Sign Date/Time: 2019 5:15 PM                                     |
+------------------------------------------------------------------------+
 
 
 
+--------------------+------------------+--------------------+--------------+
| Performing         | Address          | City/State/Zipcode | Phone Number |
| Organization       |                  |                    |              |
+--------------------+------------------+--------------------+--------------+
|   [facility] RADIOLOGY |   [address] | [place] [address] |              |
+--------------------+------------------+--------------------+--------------+
 Septic Lactic Acid (2019  4:55 PM)
 
+-------------+--------------------------+------------------+-----------------+
| Component   | Value                    | Ref Range        | Performed At    |
+-------------+--------------------------+------------------+-----------------+
| LACTIC ACID | 2.7 (H)Comment: Testing  | 0.4 - 2.0 mmol/L | [facility] LABORAT[facility] |
|             | performed at [facility];[address]     |                  |                 |
|             | [address];[place]   |                  |                 |
|             | [id number]                    |                  |                 |
+-------------+--------------------------+------------------+-----------------+
 
 
 
 
+-------------------+------------------+--------------------+--------------+
| Performing        | Address          | City/State/Zipcode | Phone Number |
| Organization      |                  |                    |              |
+-------------------+------------------+--------------------+--------------+
|   [facility] LAB[facility] |   [address] | [place] [address] |              |
+-------------------+------------------+--------------------+--------------+
 Blood Culture Set 1 (2019  4:55 PM)
 
+----------------------+------------------+-----------+-----------------+
| Component            | Value            | Ref Range | Performed At    |
+----------------------+------------------+-----------+-----------------+
| Specimen Description | BLOOD, LINE DRAW |           | [facility]      |
|                      |                  |           | LABORATORY      |
+----------------------+------------------+-----------+-----------------+
| SPECIAL REQUESTS     | R FOREARM        |           | [facility] |
+----------------------+------------------+-----------+-----------------+
| CULTURE              | NO GROWTH 6 DAYS |           | [facility]      |
|                      |                  |           | LABORATORY      |
+----------------------+------------------+-----------+-----------------+
 
 
 
+---------------------+
| Specimen            |
+---------------------+
| Blood - Blood Line  |
| Draw                |
+---------------------+
 
 
 
+-------------------+-------------------------+---------------------+----------------+
| Performing        | Address                 | City/State/Zipcode  | Phone Number   |
| Organization      |                         |                     |                |
+-------------------+-------------------------+---------------------+----------------+
|   [facility]      |   [address]  | [place] [address] |   [phone] |
| LABORATORY        | [address]                   |                     |                |
+-------------------+-------------------------+---------------------+----------------+
|   [facility] |   [address]        | [place] [address]  |                |
+-------------------+-------------------------+---------------------+----------------+
 ED INFORMATION EXCHANGE (2019  3:21 PM)
 
+------------------------------------------------------------------------+--------------+
| Narrative                                                              | Performed At |
+------------------------------------------------------------------------+--------------+
|   [id number]     Criteria Met         Care        |   ED         |
| Guidelines      10 in 12     Security and Safety  No recent Security   | INFORMATION  |
| Events currently on file     ED Care Guidelines from [facility]        | EXCHANGE     |
| [place]  Last Updated: 18 10:16 AM         Other Information:    |              |
| Currently being seen by [facility] in [place] for mental health        |              |
| [phone]  These are guidelines and the provider should    |              |
| exercise clinical judgment when providing care.  ED Care Guidelines    |              |
| from [facility]  Last Updated: 17 10:44 AM         Care |              |
|  Coordination:  This patient has been identified as having at          |              |
| leastEmergency Departments visits in the 12 months immediately         |              |
| preceding the date these guidelines were entered.Patient requires      |              |
| education on appropriate ED usage.Emphasize the importance of using    |              |
| outpatient   medical services for the treatment of chronic conditions. |              |
|   Please contact Community Health WorkerWillard at [phone] if   |              |
| patient is seen in ED.  These are guidelines and the provider should   |              |
 
| exercise clinical judgment when providing care.  These are guidelines  |              |
| and the provider should exercise clinical judgment when providing      |              |
| care.           Prescription Drug Report (12 Mo.)  PDMP query found no |              |
|  report.        E.D. Visit Count (12 mo.)  Facility Visits Low Acuity  |              |
|   [facility] 18 0   [facility] 2 0   [facility]   |              |
| [facility] 5 0   Total 25 0   Note: Visits indicate total |              |
|  known visits. Medicaid Low Acuity Dx are the number of primary        |              |
| diagnoses on the Medicaid's Low Acuity dx list.         Recent         |              |
| Emergency Department Visit Summary  Showing 10 most recent visits out  |              |
| of 25 in the past 12 months  Date Facility City State Type Diagnoses   |              |
| or Chief Complaint   2019 [facility]       |              |
| Emergency       2019 [facility]            |              |
| Emergency          Multiple Falls        Referral        Circulatory   |              |
| Problem      2019 [facility] [place] [facility]      |              |
|      ABD PAIN        Other chest pain      2019 [facility]         |              |
| [facility] Emergency          Foot Pain      2019 |              |
|  [facility] Emergency          Chest Pain          |              |
| Nausea        Shortness of Breath        Chest pain, unspecified       |              |
|      Elevated blood-pressure reading, without diagnosis of             |              |
| hypertension        Presence of aortocoronary bypass graft             |              |
| Other specified postprocedural states      2019 [facility]  |              |
| [facility] [place] Emergency          CHEST PAIN        Abnormal levels  |              |
| of other serum enzymes        Personal history of other diseases of    |              |
| the circulatory system        Chest pain, unspecified      2019 |              |
|  [facility] [place] Emergency          FISTULA BLEEDING    |              |
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
| grafts, initial encounter      Dec 22, 2018 [facility]       |              |
| [place] Emergency          CHEST PAIN        Type 2 diabetes         |              |
| mellitus with hyperglycemia        Chest pain, unspecified      Nov    |              |
| 2018 [facility] [place] Emergency    Chief Complaint:   |              |
| SOB      2018 [facility] [place] Emergency         |              |
|     FALL        Unspecified fall, initial encounter        Dependence  |              |
| on renal dialysis        End stage renal disease            Recent     |              |
| Inpatient Visit Summary  Showing 10 most recent visits out of 11 in    |              |
| the past 12 months  Date Facility City State Type Diagnoses or Chief   |              |
| Complaint   2019 [facility] Vascular       |              |
| Surgery          Foot Pain        End stage renal disease              |              |
| Dependence on renal dialysis        Peripheral vascular disease,       |              |
| unspecified        Anemia in chronic kidney disease        Other       |              |
| disorders of plasma-protein metabolism, not elsewhere classified       |              |
|      Other specified personal risk factors, not elsewhere classified   |              |
|     Dec 27, 2018 [facility] Recovery               |              |
|     Peripheral arterial disease, osteomyelitis left foot        Other  |              |
| acute osteomyelitis, left ankle and foot        Long term (current)    |              |
| use of antibiotics        End stage renal disease        Anemia in     |              |
| chronic kidney disease      Dec 5, 2018 [facility] |              |
|  General Medicine          Peripheral vascular disease, unspecified    |              |
|      End stage renal disease        Dependence on renal dialysis       |              |
|      Long term (current) use of insulin        Other chronic           |              |
| osteomyelitis, left ankle and foot        Type 2 diabetes mellitus     |              |
| with diabetic chronic kidney disease        Essential (primary)        |              |
| hypertension      2018 [facility]          |              |
| Inpatient          Upper GIB        Other chronic osteomyelitis,       |              |
| unspecified ankle and foot        End stage renal disease        Other |              |
|  specified personal risk factors, not elsewhere classified             |              |
| Chronic systolic (congestive) heart failure        Anemia in chronic   |              |
| kidney disease        Other disorders of plasma-protein metabolism,    |              |
| not elsewhere classified        Moderate protein-calorie malnutrition  |              |
|        Other disorders of phosphorus metabolism      2018      |              |
| [facility] [place] [place] [facility]          1. Type 2      |              |
 
| diabetes mellitus with other specified complication        2. Urinary  |              |
| tract infection, site not specified        3. Unspecified              |              |
| protein-calorie malnutrition        4. Other chronic osteomyelitis,    |              |
| unspecified site        5. Hypertensive heart and chronic kidney       |              |
| disease with heart failure and with stage 5 chronic kidney disease, or |              |
|  end stage renal disease        6. Chronic diastolic (congestive)      |              |
| heart failure        7. Other osteomyelitis, ankle and foot        8.  |              |
| Type 2 diabetes mellitus with foot ulcer        9. Atherosclerotic     |              |
| heart disease of native coronary artery without angina pectoris        |              |
|  10. Chronic obstructive pulmonary disease, unspecified      ,    |              |
|  [facility] [place] [facility]          1. Benign |              |
|  paroxysmal vertigo, unspecified ear        2. End stage renal disease |              |
|         3. Hypertensive chronic kidney disease with stage 5 chronic    |              |
| kidney disease or end stage renal disease        4. Urinary tract      |              |
| infection, site not specified        5. Hypertensive heart and chronic |              |
|  kidney disease with heart failure and with stage 5 chronic kidney     |              |
| disease, or end stage renal disease        6. Kidney transplant status |              |
|         7. Unspecified systolic (congestive) heart failure        8.   |              |
| Syncope and collapse        9. Type 2 diabetes mellitus with diabetic  |              |
| chronic kidney disease        10. Atherosclerotic heart disease of     |              |
| native coronary artery without angina pectoris      Sep 15, 2018       |              |
| [facility] [place] [facility]          Acute     |              |
| ischemic heart disease, unspecified        Long term (current) use of  |              |
| insulin        Dependence on renal dialysis        End stage renal     |              |
| disease        Type 2 diabetes mellitus with diabetic chronic kidney   |              |
| disease        Essential (primary) hypertension        Anemia in       |              |
| chronic kidney disease      2018 [facility] |              |
|  Medical Surgical          0. Cough        1. Pneumonia due to         |              |
| Pseudomonas        2. End stage renal disease        3. Hypertensive   |              |
| heart and chronic kidney disease with heart failure and with stage 5   |              |
| chronic kidney disease, or end stage renal disease        4. Cachexia  |              |
|        5. Chronic obstructive pulmonary disease with acute lower       |              |
| respiratory infection        6. Type 2 diabetes mellitus with diabetic |              |
|  chronic kidney disease        7. Heart failure, unspecified        8. |              |
|  Hyperlipidemia, unspecified        9. Gastro-esophageal reflux        |              |
| disease without esophagitis      2018 [facility]       |              |
| [facility]. [place] [facility]          0. Other chest pain        1.      |              |
| Gastro-esophageal reflux disease without esophagitis        2. End     |              |
| stage renal disease        3. Hypertensive heart and chronic kidney    |              |
| disease with heart failure and with stage 5 chronic kidney disease, or |              |
|  end stage renal disease        4. Chronic systolic (congestive) heart |              |
|  failure        5. Atherosclerotic heart disease of native coronary    |              |
| artery with other forms of angina pectoris        6. Type 2 diabetes   |              |
| mellitus with diabetic chronic kidney disease        7.                |              |
| Hyperlipidemia, unspecified        8. Major depressive disorder,       |              |
| single episode, unspecified        9. Chronic obstructive pulmonary    |              |
| disease, unspecified      Mar 6, 2018 [facility]     |              |
| [facility]. [place] [facility]          Heart Failure        Need for iv access  |              |
|        Type 2 diabetes mellitus with other specified complication      |              |
|      Long term (current) use of insulin        Paroxysmal atrial       |              |
| fibrillation        Anemia in chronic kidney disease                   |              |
| Atherosclerotic heart disease of native coronary artery without angina |              |
|  pectoris        Cardiomyopathy, unspecified        End stage renal    |              |
| disease        Other specified postprocedural states            Care   |              |
| Providers  Provider PRC Type Phone Fax Service Dates   HEADINGS, SANTIAGO, |              |
|  F.N.P. Nurse Practitioner: Family     Current      Marco Antonio Martinez     |              |
| /Care Coordinator [phone]    2019 -          |              |
| Current      [facility] [phone]    2019 |              |
|  - Current      [facility] Primary            |              |
| Care    [phone] Current      [facility]      |              |
 
| [place] [facility]     Current      MANOLO GONZALEZ Primary Care         |              |
| Current      [email or web address] Mental Health Provider [phone] |              |
|  [phone] Current      [facility] Case or Care Manager     Current      |              |
|  MIRTA Goel Case or Care Manager [phone]  |              |
| [phone] Current      Jairo Gutierrez MD Other     Current     |              |
|      TESARO  This patient has registered at the [facility]      |              |
| [facility] Emergency Department   For more information    |              |
| visit:                                                                 |              |
| [email or web address] |              |
| [id number]   The above information is provided for the sole purpose of     |              |
| patient treatment. Use of this information beyond the terms of Data    |              |
| Sharing Memorandum of Understanding and License Agreement is           |              |
| prohibited. In certain cases not all visits may be represented.        |              |
| Consult the aforementioned facilities for additional information.      |              |
| 2019 [employer] - [place], [place] -      |              |
| [email or web address]                                         |              |
+------------------------------------------------------------------------+--------------+
 
 
 
+-------------------------------------------------------------------------------------------
--------------------------------------------------------------------------------------------
--------------------+
| Procedure Note                                                                            
                                                                                            
                    |
+-------------------------------------------------------------------------------------------
--------------------------------------------------------------------------------------------
--------------------+
|   Interface, Lab - 2019  3:22 PM PST  Formatting of this note may be different      
                                                                                            
                    |
| from the original.[id number] [id number] [facility]  Care Guidelines  10 in     
                                                                                            
                    |
| 12Security and SafetyNo recent Security Events currently on fileED Care Guidelines from   
                                                                                            
                    |
| [facility] Updated: 18 10:16 AM  Other Information:Currently being      
                                                                                            
                    |
| seen by [facility] in [place] for mental health concerns.[phone] are            
                                                                                            
                    |
| guidelines and the provider should exercise clinical judgment when providing care.ED      
                                                                                            
                    |
| Care Guidelines from [facility] Updated: 17 10:44 AM  Care             
                                                                                            
                    |
| Coordination:This patient has been identified as having at leastEmergency Departments     
                                                                                            
                    |
| visits in the 12 months immediately preceding the date these guidelines were              
                                                                                            
                    |
| entered.Patient requires education on appropriate ED usage.Emphasize the importance of    
                                                                                            
                    |
| using outpatient medical services for the treatment of chronic conditions.Please contact  
 
                                                                                            
                    |
|  Community Health WorkerWillard at [phone] if patient is seen in ED.These are      
                                                                                            
                    |
| guidelines and the provider should exercise clinical judgment when providing care.These   
                                                                                            
                    |
| are guidelines and the provider should exercise clinical judgment when providing          
                                                                                            
                    |
| care.Prescription Drug Report (12 Mo.)PDMP query found no report.E.D. Visit Count (12     
                                                                                            
                    |
| mo.)Facility Visits Low Acuity [facility] 18 0 [facility] 2 0    
                                                                                            
                    |
| [facility] 5 0 Total 25 0 Note: Visits indicate total known visits.   
                                                                                            
                    |
| Medicaid Low Acuity Dx are the number of primary diagnoses on the Medicaid's Low Acuity   
                                                                                            
                    |
| dx list.  Recent Emergency Department Visit SummaryShowing 10 most recent visits out of   
                                                                                            
                    |
| 25 in the past 12 monthsDate Facility City State Type Diagnoses or Chief Complaint Feb    
                                                                                            
                    |
| 2019 [facility] [place] Emergency   2019 [facility] [place]     
                                                                                            
                    |
| [facility] Emergency    Multiple Falls    Referral    Circulatory Problem  2019     
                                                                                            
                    |
| [facility] [place] Emergency    ABD PAIN    Other chest pain  2019    
                                                                                            
                    |
| [facility] [place] Emergency    Foot Pain  2019 [facility] [place]  
                                                                                            
                    |
|  [facility] Emergency    Chest Pain    Nausea    Shortness of Breath    Chest pain,        
                                                                                            
                    |
| unspecified    Elevated blood-pressure reading, without diagnosis of hypertension         
                                                                                            
                    |
| Presence of aortocoronary bypass graft    Other specified postprocedural states  ,  
                                                                                            
                    |
|   [facility] [place]. [place] Emergency    CHEST PAIN    Abnormal levels of other  
                                                                                            
                    |
|  serum enzymes    Personal history of other diseases of the circulatory system    Chest   
                                                                                            
                    |
| pain, unspecified  2019 [facility] [place] Emergency    FISTULA        
                                                                                            
                    |
| BLEEDING    Hemorrhage due to vascular prosthetic devices, implants and grafts, initial   
 
                                                                                            
                    |
| encounter  Dec 22, 2018 [facility] [place] Emergency    CHEST PAIN    Type 2  
                                                                                            
                    |
|  diabetes mellitus with hyperglycemia    Chest pain, unspecified  2018 [facility]      
                                                                                            
                    |
| [place] Emergency  Chief Complaint: SOB  2018 [facility]       
                                                                                            
                    |
| [facility] [place] Emergency    FALL    Unspecified fall, initial encounter    Dependence   
                                                                                            
                    |
| on renal dialysis    End stage renal disease  Recent Inpatient Visit SummaryShowing 10    
                                                                                            
                    |
| most recent visits out of 11 in the past 12 monthsDate Facility City State Type           
                                                                                            
                    |
| Diagnoses or Chief Complaint 2019 [facility] [place] Vascular         
                                                                                            
                    |
| Surgery    Foot Pain    End stage renal disease    Dependence on renal dialysis           
                                                                                            
                    |
| Peripheral vascular disease, unspecified    Anemia in chronic kidney disease    Other     
                                                                                            
                    |
| disorders of plasma-protein metabolism, not elsewhere classified    Other specified       
                                                                                            
                    |
| personal risk factors, not elsewhere classified  Dec 27, 2018 [facility] [place]        
                                                                                            
                    |
| [place] Recovery    Peripheral arterial disease, osteomyelitis left foot    Other       
                                                                                            
                    |
| acute osteomyelitis, left ankle and foot    Long term (current) use of antibiotics        
                                                                                            
                    |
| End stage renal disease    Anemia in chronic kidney disease  Dec 5, 2018 [facility]  
                                                                                            
                    |
  [place] General Medicine    Peripheral vascular disease, unspecified    End       
                                                                                            
                    |
| stage renal disease    Dependence on renal dialysis    Long term (current) use of         
                                                                                            
                    |
| insulin    Other chronic osteomyelitis, left ankle and foot    Type 2 diabetes mellitus   
                                                                                            
                    |
| with diabetic chronic kidney disease    Essential (primary) hypertension  2018    
                                                                                            
                    |
| [facility] [place] Inpatient    Upper GIB    Other chronic osteomyelitis,     
                                                                                            
                    |
| unspecified ankle and foot    End stage renal disease    Other specified personal risk    
 
                                                                                            
                    |
| factors, not elsewhere classified    Chronic systolic (congestive) heart failure          
                                                                                            
                    |
| Anemia in chronic kidney disease    Other disorders of plasma-protein metabolism, not     
                                                                                            
                    |
| elsewhere classified    Moderate protein-calorie malnutrition    Other disorders of       
                                                                                            
                    |
| phosphorus metabolism  2018 [facility] [place] Medical Surgical    1.  
                                                                                            
                    |
|  Type 2 diabetes mellitus with other specified complication    2. Urinary tract           
                                                                                            
                    |
| infection, site not specified    3. Unspecified protein-calorie malnutrition    4. Other  
                                                                                            
                    |
|  chronic osteomyelitis, unspecified site    5. Hypertensive heart and chronic kidney      
                                                                                            
                    |
| disease with heart failure and with stage 5 chronic kidney disease, or end stage renal    
                                                                                            
                    |
| disease    6. Chronic diastolic (congestive) heart failure    7. Other osteomyelitis,     
                                                                                            
                    |
| ankle and foot    8. Type 2 diabetes mellitus with foot ulcer    9. Atherosclerotic       
                                                                                            
                    |
| heart disease of native coronary artery without angina pectoris    10. Chronic            
                                                                                            
                    |
| obstructive pulmonary disease, unspecified  [facility] [place] [place]     
                                                                                            
                    |
| [facility]    1. Benign paroxysmal vertigo, unspecified ear    2. End stage renal   
                                                                                            
                    |
| disease    3. Hypertensive chronic kidney disease with stage 5 chronic kidney disease or  
                                                                                            
                    |
|  end stage renal disease    4. Urinary tract infection, site not specified    5.          
                                                                                            
                    |
| Hypertensive heart and chronic kidney disease with heart failure and with stage 5         
                                                                                            
                    |
| chronic kidney disease, or end stage renal disease    6. Kidney transplant status    7.   
                                                                                            
                    |
| Unspecified systolic (congestive) heart failure    8. Syncope and collapse    9. Type 2   
                                                                                            
                    |
| diabetes mellitus with diabetic chronic kidney disease    10. Atherosclerotic heart       
                                                                                            
                    |
| disease of native coronary artery without angina pectoris  Sep 15, 2018 [facility]    
 
                                                                                            
                    |
| [place] Medical Surgical    Acute ischemic heart disease, unspecified         
                                                                                            
                    |
| Long term (current) use of insulin    Dependence on renal dialysis    End stage renal     
                                                                                            
                    |
| disease    Type 2 diabetes mellitus with diabetic chronic kidney disease    Essential     
                                                                                            
                    |
| (primary) hypertension    Anemia in chronic kidney disease  2018 [facility]            
                                                                                            
                    |
| [place] Medical Surgical    0. Cough    1. Pneumonia due to Pseudomonas   
                                                                                            
                    |
|    2. End stage renal disease    3. Hypertensive heart and chronic kidney disease with    
                                                                                            
                    |
| heart failure and with stage 5 chronic kidney disease, or end stage renal disease    4.   
                                                                                            
                    |
| Cachexia    5. Chronic obstructive pulmonary disease with acute lower respiratory         
                                                                                            
                    |
| infection    6. Type 2 diabetes mellitus with diabetic chronic kidney disease    7.       
                                                                                            
                    |
| Heart failure, unspecified    8. Hyperlipidemia, unspecified    9. Gastro-esophageal      
                                                                                            
                    |
| reflux disease without esophagitis  2018 [facility] [place] Medical     
                                                                                            
                    |
| Surgical    0. Other chest pain    1. Gastro-esophageal reflux disease without            
                                                                                            
                    |
| esophagitis    2. End stage renal disease    3. Hypertensive heart and chronic kidney     
                                                                                            
                    |
| disease with heart failure and with stage 5 chronic kidney disease, or end stage renal    
                                                                                            
                    |
| disease    4. Chronic systolic (congestive) heart failure    5. Atherosclerotic heart     
                                                                                            
                    |
| disease of native coronary artery with other forms of angina pectoris    6. Type 2        
                                                                                            
                    |
| diabetes mellitus with diabetic chronic kidney disease    7. Hyperlipidemia, unspecified  
                                                                                            
                    |
|     8. Major depressive disorder, single episode, unspecified    9. Chronic obstructive   
                                                                                            
                    |
| pulmonary disease, unspecified  Mar 6, 2018 [facility]        
                                                                                            
                    |
| Cardiology    Heart Failure    Need for iv access    Type 2 diabetes mellitus with other  
 
                                                                                            
                    |
|  specified complication    Long term (current) use of insulin    Paroxysmal atrial        
                                                                                            
                    |
| fibrillation    Anemia in chronic kidney disease    Atherosclerotic heart disease of      
                                                                                            
                    |
| native coronary artery without angina pectoris    Cardiomyopathy, unspecified    End      
                                                                                            
                    |
| stage renal disease    Other specified postprocedural states  [facility]  
                                                                                            
                    |
|  Type Phone Fax Service Dates HEADINGS, CARLOS ALBERTO HENDERSON. Nurse Practitioner: Family           
                                                                                            
                    |
| Current  Marco Antonio Martinez /Care Coordinator [phone]  2019 -       
                                                                                            
                    |
| Current  Marco Antonio Martinez Primary Care [phone]  2019 - Current  [facility]        
                                                                                            
                    |
| [facility] Primary Care  [phone] Current  [facility]  
                                                                                            
                    |
|  [facility] Primary Care   Current  MANOLO GONZALEZ Primary Care   Current  [facility],    
                                                                                            
                    |
| [facility] Mental [facility] Provider [phone] Current  [employer] Case or Care  
                                                                                            
                    |
|  Manager   Current  MIRTA Goel Case or Care Manager [phone]  
                                                                                            
                    |
|  [phone] Current  Jairo Gutierrez MD Other   Current  Collective PortalThis      
                                                                                            
                    |
| patient has registered at the [facility] Emergency Department For     
                                                                                            
                    |
| more information visit:                                                                   
                                                                                            
                    |
| [email or web address] The above    
                                                                                            
                    |
| information is provided for the sole purpose of patient treatment. Use of this            
                                                                                            
                    |
| information beyond the terms of Data Sharing Memorandum of Understanding and License      
                                                                                            
                    |
| Agreement is prohibited. In certain cases not all visits may be represented. Consult the  
                                                                                            
                    |
|  aforementioned facilities for additional information. 2019 [facility]            
                                                                                            
                    |
| [employer] [place], [place] - [email or web address]                  
 
                                                                                            
                    |
|   End stage renal disease                                                                 
                                                                                            
                    |
|   Dependence on renal dialysis                                                            
                                                                                            
                    |
|   Long term (current) use of insulin                                                      
                                                                                            
                    |
|   Other chronic osteomyelitis, left ankle and foot                                        
                                                                                            
                    |
|   Type 2 diabetes mellitus with diabetic chronic kidney disease                           
                                                                                            
                    |
|   Essential (primary) hypertension                                                        
                                                                                            
                    |
|                                                                                           
                                                                                            
                    |
[facility] [place] [place] [facility]                                      
                                                                                            
                    |
|  Upper GIB                                                                                
                                                                                            
                    |
|   Other chronic osteomyelitis, unspecified ankle and foot                                 
                                                                                            
                    |
|   End stage renal disease                                                                 
                                                                                            
                    |
|   Other specified personal risk factors, not elsewhere classified                         
                                                                                            
                    |
|   Chronic systolic (congestive) heart failure                                             
                                                                                            
                    |
|   Anemia in chronic kidney disease                                                        
                                                                                            
                    |
|   Other disorders of plasma-protein metabolism, not elsewhere classified                  
                                                                                            
                    |
|   Moderate protein-calorie malnutrition                                                   
                                                                                            
                    |
|   Other disorders of phosphorus metabolism                                                
                                                                                            
                    |
|                                                                                           
                                                                                            
                    |
|2018 [facility]. [place] [facility]                                
                                                                                            
                    |
|  1. Type 2 diabetes mellitus with other specified complication                            
 
                                                                                            
                    |
|   2. Urinary tract infection, site not specified                                          
                                                                                            
                    |
|   3. Unspecified protein-calorie malnutrition                                             
                                                                                            
                    |
|   4. Other chronic osteomyelitis, unspecified site                                        
                                                                                            
                    |
|   5. 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
onic kidney disease, or end stage renal disease                                             
                    |
|   6. Chronic diastolic (congestive) heart failure                                         
                                                                                            
                    |
|   7. Other osteomyelitis, ankle and foot                                                  
                                                                                            
                    |
|   8. Type 2 diabetes mellitus with foot ulcer                                             
                                                                                            
                    |
|   9. Atherosclerotic heart disease of native coronary artery without angina pectoris      
                                                                                            
                    |
|   10. Chronic obstructive pulmonary disease, unspecified                                  
                                                                                            
                    |
|                                                                                           
                                                                                            
                    |
|2018 [facility]. [place] [facility]                                 
                                                                                            
                    |
|  1. Benign paroxysmal vertigo, unspecified ear                                            
                                                                                            
                    |
|   2. End stage renal disease                                                              
                                                                                            
                    |
|   3. Hypertensive chronic kidney disease with stage 5 chronic kidney disease or end stage 
renal disease                                                                               
                    |
|   4. Urinary tract infection, site not specified                                          
                                                                                            
                    |
|   5. 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
onic kidney disease, or end stage renal disease                                             
                    |
|   6. Kidney transplant status                                                             
                                                                                            
                    |
|   7. Unspecified systolic (congestive) heart failure                                      
                                                                                            
                    |
|   8. Syncope and collapse                                                                 
                                                                                            
                    |
|   9. Type 2 diabetes mellitus with diabetic chronic kidney disease                        
 
                                                                                            
                    |
|   10. Atherosclerotic heart disease of native coronary artery without angina pectoris     
                                                                                            
                    |
|                                                                                           
                                                                                            
                    |
|Sep 15, [address] [place]. [place] [facility]                           
                                                                                            
                    |
|  Acute ischemic heart disease, unspecified                                                
                                                                                            
                    |
|   Long term (current) use of insulin                                                      
                                                                                            
                    |
|   Dependence on renal dialysis                                                            
                                                                                            
                    |
|   End stage renal disease                                                                 
                                                                                            
                    |
|   Type 2 diabetes mellitus with diabetic chronic kidney disease                           
                                                                                            
                    |
|   Essential (primary) hypertension                                                        
                                                                                            
                    |
|   Anemia in chronic kidney disease                                                        
                                                                                            
                    |
|                                                                                           
                                                                                            
                    |
|2018 [facility] [place] [facility]                                
                                                                                            
                    |
|  0. Cough                                                                                 
                                                                                            
                    |
|   1. Pneumonia due to Pseudomonas                                                         
                                                                                            
                    |
|   2. End stage renal disease                                                              
                                                                                            
                    |
|   3. 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
onic kidney disease, or end stage renal disease                                             
                    |
|   4. Cachexia                                                                             
                                                                                            
                    |
|   5. Chronic obstructive pulmonary disease with acute lower respiratory infection         
                                                                                            
                    |
|   6. Type 2 diabetes mellitus with diabetic chronic kidney disease                        
                                                                                            
                    |
|   7. Heart failure, unspecified                                                           
 
                                                                                            
                    |
|   8. Hyperlipidemia, unspecified                                                          
                                                                                            
                    |
|   9. Gastro-esophageal reflux disease without esophagitis                                 
                                                                                            
                    |
|                                                                                           
                                                                                            
                    |
|2018 [facility]. [place] [facility]                                 
                                                                                            
                    |
|  0. Other chest pain                                                                      
                                                                                            
                    |
|   1. Gastro-esophageal reflux disease without esophagitis                                 
                                                                                            
                    |
|   2. End stage renal disease                                                              
                                                                                            
                    |
|   3. 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
onic kidney disease, or end stage renal disease                                             
                    |
|   4. Chronic systolic (congestive) heart failure                                          
                                                                                            
                    |
|   5. 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
ctoris                                                                                      
                    |
|   6. Type 2 diabetes mellitus with diabetic chronic kidney disease                        
                                                                                            
                    |
|   7. Hyperlipidemia, unspecified                                                          
                                                                                            
                    |
|   8. Major depressive disorder, single episode, unspecified                               
                                                                                            
                    |
|   9. Chronic obstructive pulmonary disease, unspecified                                   
                                                                                            
                    |
|                                                                                           
                                                                                            
                    |
|Mar 6, 2018 [facility]                              
                                                                                            
                    |
|  Heart Failure                                                                            
                                                                                            
                    |
|   Need for iv access                                                                      
                                                                                            
                    |
|   Type 2 diabetes mellitus with other specified complication                              
                                                                                            
                    |
|   Long term (current) use of insulin                                                      
 
                                                                                            
                    |
|   Paroxysmal atrial fibrillation                                                          
                                                                                            
                    |
|   Anemia in chronic kidney disease                                                        
                                                                                            
                    |
|   Atherosclerotic heart disease of native coronary artery without angina pectoris         
                                                                                            
                    |
|   Cardiomyopathy, unspecified                                                             
                                                                                            
                    |
|   End stage renal disease                                                                 
                                                                                            
                    |
|   Other specified postprocedural states                                                   
                                                                                            
                    |
|                                                                                           
                                                                                            
                    |
|                                                                                           
                                                                                            
                    |
|                                                                                           
                                                                                            
                    |
|Care Providers                                                                             
                                                                                            
                    |
|Provider PRC Type Phone Fax Service Dates                                                  
                                                                                            
                    |
|HEADINGS, SALEEM HENDERSONN.P. Nurse Practitioner: Family   Current                                
                                                                                            
                    |
|Marco Antonio Martinez /Care Coordinator [phone]  2019 - Current         
                                                                                            
                    |
|[facility] [phone]  2019 - Current                          
                                                                                            
                    |
|[facility]  [phone] Current                   
                                                                                            
                    |
[facility]   Current                                
                                                                                            
                    |
|[facility] GON[facility]ALE[facility] Primary Care   Current                                                        
                                                                                            
                    |
|[email or web address] Provider [phone] Current                 
                                                                                            
                    |
|[facility] Case or Care Manager   Current                                                   
                                                                                            
                    |
|MIRTA Goel Case or Care Manager [phone] Current
 
                                                                                            
                    |
|Jairo Gutierrez MD Other   Current                                                       
                                                                                            
                    |
|                                                                                           
                                                                                            
                    |
|[employer] Portal                                                                          
                                                                                            
                    |
|This patient has registered at the [facility]     
                                                                                            
                    |
|For more information visit: [email or web address]
[email or web address]                                                                               
                    |
|The above information is provided for the sole purpose of patient treatment. Use of this in
formation beyond the terms of Data Sharing Memorandum of Understanding and License Agreement
 is prohibited. In  |
|certain cases not all visits may be represented. Consult the aforementioned facilities for 
additional information.                                                                     
                    |
|2019 [employer] - [place], [place] - [email or web address]                                                                                       
                    |
+-------------------------------------------------------------------------------------------
--------------------------------------------------------------------------------------------
--------------------+
 
 
 
+-------------------+---------+--------------------+--------------+
| Performing        | Address | City/State/Zipcode | Phone Number |
| Organization      |         |                    |              |
+-------------------+---------+--------------------+--------------+
|   ED INFORMATION  |         |                    |              |
| EXCHANGE          |         |                    |              |
+-------------------+---------+--------------------+--------------+
 in this encounter
 
 Visit Diagnoses
 
 
+-------------------------------------------------------------------------------------+
| Diagnosis                                                                           |
+-------------------------------------------------------------------------------------+
|   Fall in home, initial encounter - Primary                                         |
+-------------------------------------------------------------------------------------+
|   Cellulitis of right toe                                                           |
+-------------------------------------------------------------------------------------+
|   Closed displaced fracture of distal phalanx of right great toe, initial encounter |
+-------------------------------------------------------------------------------------+
|   Generalized weakness                                                              |
+-------------------------------------------------------------------------------------+
|   Other malaise and fatigue                                                         |
+-------------------------------------------------------------------------------------+
|   Fatigue, unspecified type                                                         |
+-------------------------------------------------------------------------------------+
 
 
 
 
 Admitting Diagnoses
 
 
+-------------------------------------------------------------------------------------+
| Diagnosis                                                                           |
+-------------------------------------------------------------------------------------+
|   Generalized weakness                                                              |
+-------------------------------------------------------------------------------------+
|   Other malaise and fatigue                                                         |
+-------------------------------------------------------------------------------------+
|   Closed displaced fracture of distal phalanx of right great toe, initial encounter |
+-------------------------------------------------------------------------------------+
|   Fall in home, initial encounter                                                   |
+-------------------------------------------------------------------------------------+
|   Cellulitis of right toe                                                           |
+-------------------------------------------------------------------------------------+
|   Fatigue, unspecified type                                                         |
+-------------------------------------------------------------------------------------+
 
 
 
 Administered Medications
 
 
+------------------+--------+---------+------+------+------+
| Medication Order | MAR    | Action  | Dose | Rate | Site |
|                  | Action | Date    |      |      |      |
+------------------+--------+---------+------+------+------+
 
 
 
+-----------------------------------+---+
|   acetaminophen (TYLENOL)         |   |
| suppository 650 mg  650 mg,       |   |
| Rectal, Every 6 Hours PRN, Mild   |   |
| Pain (1-3), Fever, Starting Thu   |   |
| 19 at 2229                   |   |
+-----------------------------------+---+
|                                   |   |
+-----------------------------------+---+
|   acetaminophen (TYLENOL) tablet  |   |
| 650 mg  650 mg, Oral, Every 6     |   |
| Hours PRN, Mild Pain (1-3),       |   |
| Fever, Starting Thu 19 at    |   |
| 2229                              |   |
+-----------------------------------+---+
|                                   |   |
+-----------------------------------+---+
 
 
 
+-----------------------------------+-------+----------+--------+---+---+
|   apixaban (ELIQUIS) tablet 2.5   | Given |  | 2.5 mg |   |   |
| mg  2.5 mg, Oral, 2 Times Daily,  |       | 9 23:39  |        |   |   |
| First dose on Thu 19 at 2300 |       | PST      |        |   |   |
+-----------------------------------+-------+----------+--------+---+---+
 
 
 
 
+-------+----------+--------+---+---+
| Given | 2/15/201 | 2.5 mg |   |   |
|       | 9 09:40  |        |   |   |
|       | PST      |        |   |   |
+-------+----------+--------+---+---+
 
 
 
+---+---+
|   |   |
+---+---+
 
 
 
+-----------------------------------+-------+----------+-------+---+---+
|   atorvastatin (LIPITOR) tablet   | Given |  | 80 mg |   |   |
| 80 mg  80 mg, Oral, Nightly,      |       | 9 23:38  |       |   |   |
| First dose on Thu 19 at 2300 |       | PST      |       |   |   |
+-----------------------------------+-------+----------+-------+---+---+
 
 
 
+---+---+
|   |   |
+---+---+
 
 
 
+-----------------------------------+-------+----------+---------+---+---+
|   carvedilol (COREG) tablet 12.5  | Given |  | 12.5 mg |   |   |
| mg  12.5 mg, Oral, 2 Times Daily  |       | 9 23:38  |         |   |   |
| With Meals, First dose on Thu     |       | PST      |         |   |   |
| 19 at 2300                   |       |          |         |   |   |
+-----------------------------------+-------+----------+---------+---+---+
 
 
 
+-------+----------+---------+---+---+
| Given | 2/15/201 | 12.5 mg |   |   |
|       | 9 09:41  |         |   |   |
|       | PST      |         |   |   |
+-------+----------+---------+---+---+
 
 
 
+---+---+
|   |   |
+---+---+
 
 
 
+-----------------------------------+---------+----------+-----+-------+---+
|   cefTAZidime (FORTAZ) 2 g in     | New Bag |  | 2 g | 100   |   |
| sodium chloride (IV) 0.9 % 50 mL  |         | 9 18:05  |     | mL/hr |   |
| IVPB  2 g, Intravenous,           |         | PST      |     |       |   |
| Administer over 30 Minutes, Once, |         |          |     |       |   |
|  Thu 19 at 1700, For 1 dose  |         |          |     |       |   |
+-----------------------------------+---------+----------+-----+-------+---+
 
 
 
 
+-----------------------------------+---+
|                                   |   |
+-----------------------------------+---+
|   dextrose 10 % infusion  at      |   |
| 0-100 mL/hr, Intravenous,         |   |
| Continuous PRN, Hypoglycemia,     |   |
| Starting Thu 19 at , For |   |
|  BG 70 or less run infusion at    |   |
| 100 mL/ hr. Discontinue when BG   |   |
| increases to 100 mg/dl or greater |   |
|  x 2-3 hours and patient is       |   |
| eating. Call provider if BG not   |   |
| maintained after 2-3 hours.       |   |
+-----------------------------------+---+
|                                   |   |
+-----------------------------------+---+
|   dextrose 50 % solution 12 mL    |   |
| 12 mL, Intravenous, PRN,          |   |
| Hypoglycemia (BG < 70 mg/dL),     |   |
| Starting Thu 19 at       |   |
+-----------------------------------+---+
|                                   |   |
+-----------------------------------+---+
|   dextrose 50 % solution 25 mL    |   |
| 25 mL, Intravenous, PRN,          |   |
| Hypoglycemia (BG < 70 mg/dL),     |   |
| Starting Thu 19 at       |   |
+-----------------------------------+---+
|                                   |   |
+-----------------------------------+---+
|   glucagon (GLUCAGEN) injection   |   |
| 0.5 mg  0.5 mg, Intramuscular,    |   |
| PRN, Hypoglycemia, (BG < 70       |   |
| mg/dL), Starting Thu 19 at   |   |
|                               |   |
+-----------------------------------+---+
|                                   |   |
+-----------------------------------+---+
|   glucagon (GLUCAGEN) injection 1 |   |
|  mg  1 mg, Intramuscular, PRN,    |   |
| Hypoglycemia, (BG < 70 mg/dL),    |   |
| Starting u 19 at       |   |
+-----------------------------------+---+
|                                   |   |
+-----------------------------------+---+
 
 
 
+-----------------------------------+-------+----------+----------+---+---+
|   HYDROcodone-acetaminophen       | Given |  | 1 tablet |   |   |
| (NORCO) 5-325 MG per tablet 1     |       | 9 17:51  |          |   |   |
| tablet  1 tablet, Oral, Once, Thu |       | PST      |          |   |   |
|  19 at 1740, For 1 dose      |       |          |          |   |   |
+-----------------------------------+-------+----------+----------+---+---+
 
 
 
+---+---+
 
|   |   |
+---+---+
 
 
 
+-----------------------------------+-------+----------+----------+---+---+
|   HYDROcodone-acetaminophen       | Given |  | 1 tablet |   |   |
| (NORCO) 5-325 MG per tablet 1     |       | 9 23:39  |          |   |   |
| tablet  1 tablet, Oral, Every 4   |       | PST      |          |   |   |
| Hours PRN, Moderate Pain (4-6),   |       |          |          |   |   |
| Severe Pain (7-10), Starting Thu  |       |          |          |   |   |
| 19 at                    |       |          |          |   |   |
+-----------------------------------+-------+----------+----------+---+---+
 
 
 
+-------+----------+----------+---+---+
| Given | 2/15/201 | 1 tablet |   |   |
|       | 9 04:30  |          |   |   |
|       | PST      |          |   |   |
+-------+----------+----------+---+---+
| Given | 2/15/201 | 1 tablet |   |   |
|       | 9 09:39  |          |   |   |
|       | PST      |          |   |   |
+-------+----------+----------+---+---+
 
 
 
+---+---+
|   |   |
+---+---+
 
 
 
+-----------------------------------+-------+----------+----------+---+---+
|   insulin glargine (LANTUS)       | Given | 2/15/201 | 21 Units |   |   |
| injection 21 Units  21 Units,     |       | 9 00:29  |          |   |   |
| Subcutaneous, Nightly, First dose |       | PST      |          |   |   |
|  on Fri 2/15/19 at 0000           |       |          |          |   |   |
+-----------------------------------+-------+----------+----------+---+---+
 
 
 
+---+---+
|   |   |
+---+---+
 
 
 
+-----------------------------------+-------+----------+---------+---+---+
|   insulin lispro (human)          | Given |  | 3 Units |   |   |
| (HUMALOG) injection 0-10 Units    |       | 9 23:36  |         |   |   |
| 0-10 Units, Subcutaneous, 3 Times |       | PST      |         |   |   |
|  Daily Before Meals, First dose   |       |          |         |   |   |
| on 19 at 2300            |       |          |         |   |   |
+-----------------------------------+-------+----------+---------+---+---+
 
 
 
+-------+----------+---------+---+---+
 
| Given | 2/15/201 | 3 Units |   |   |
|       | 9 12:25  |         |   |   |
|       | PST      |         |   |   |
+-------+----------+---------+---+---+
 
 
 
+---+---+
|   |   |
+---+---+
 
 
 
+-----------------------------------+-------+----------+---------+---+---+
|   insulin lispro (human)          | Given |  | 1 Units |   |   |
| (HUMALOG) injection 0-5 Units     |       | 9 23:36  |         |   |   |
| 0-5 Units, Subcutaneous, Nightly, |       | PST      |         |   |   |
|  First dose on 19 at     |       |          |         |   |   |
| 2300                              |       |          |         |   |   |
+-----------------------------------+-------+----------+---------+---+---+
 
 
 
+---+---+
|   |   |
+---+---+
 
 
 
+-----------------------------------+-------+----------+-------+---+---+
|   isosorbide mononitrate 24 hr    | Given | 2/15/201 | 60 mg |   |   |
| tablet 60 mg  60 mg, Oral, Daily, |       | 9 09:40  |       |   |   |
|  First dose on Fri 2/15/19 at     |       | PST      |       |   |   |
| 0900                              |       |          |       |   |   |
+-----------------------------------+-------+----------+-------+---+---+
 
 
 
+---+---+
|   |   |
+---+---+
 
 
 
+-----------------------------------+-------+----------+------+---+---+
|   LORazepam (ATIVAN) tablet 1 mg  | Given | 2/15/201 | 1 mg |   |   |
|  1 mg, Oral, Every 8 Hours PRN,   |       | 9 13:33  |      |   |   |
| Anxiety, Starting Fri 2/15/19 at  |       | PST      |      |   |   |
| 1247                              |       |          |      |   |   |
+-----------------------------------+-------+----------+------+---+---+
 
 
 
+---+---+
|   |   |
+---+---+
 
 
 
+-----------------------------------+-------+----------+--------+---+---+
 
|   losartan (COZAAR) tablet 100 mg | Given | 2/15/201 | 100 mg |   |   |
|   100 mg, Oral, Daily, First dose |       | 9 09:41  |        |   |   |
|  on Fri 2/15/19 at 0900           |       | PST      |        |   |   |
+-----------------------------------+-------+----------+--------+---+---+
 
 
 
+---+---+
|   |   |
+---+---+
 
 
 
+-----------------------------------+-------+----------+-------+---+---+
|   nortriptyline (PAMELOR) capsule | Given | 2/15/201 | 25 mg |   |   |
|  25 mg  25 mg, Oral, Every        |       | 9 09:39  |       |   |   |
| Morning, First dose on Fri        |       | PST      |       |   |   |
| 2/15/19 at 0900                   |       |          |       |   |   |
+-----------------------------------+-------+----------+-------+---+---+
 
 
 
+---+---+
|   |   |
+---+---+
 
 
 
+-----------------------------------+-------+----------+-------+---+---+
|   nortriptyline (PAMELOR) capsule | Given | 2/15/201 | 75 mg |   |   |
|  75 mg  75 mg, Oral, Nightly,     |       | 9 00:30  |       |   |   |
| First dose on Fri 2/15/19 at 0000 |       | PST      |       |   |   |
+-----------------------------------+-------+----------+-------+---+---+
 
 
 
+-----------------------------------+---+
|                                   |   |
+-----------------------------------+---+
|   ondansetron (ZOFRAN) injection  |   |
| 4 mg  4 mg, Intravenous, Every 6  |   |
| Hours PRN, Nausea, Vomiting,      |   |
| Starting Thu 19 at 2229      |   |
+-----------------------------------+---+
|                                   |   |
+-----------------------------------+---+
|   ondansetron (ZOFRAN-ODT)        |   |
| disintegrating tablet 4 mg  4 mg, |   |
|  Oral, Every 6 Hours PRN, Nausea, |   |
|  Vomiting, Starting Thu 19   |   |
| at 2229                           |   |
+-----------------------------------+---+
|                                   |   |
+-----------------------------------+---+
 
 
 
+-----------------------------------+-------+----------+--------+---+---+
|   oxybutynin (DITROPAN) tablet    | Given |  | 7.5 mg |   |   |
| 7.5 mg  7.5 mg, Oral, 2 Times     |       | 9 23:38  |        |   |   |
 
| Daily, First dose on Thu 19  |       | PST      |        |   |   |
| at 2300                           |       |          |        |   |   |
+-----------------------------------+-------+----------+--------+---+---+
 
 
 
+-------+----------+--------+---+---+
| Given | 2/15/201 | 7.5 mg |   |   |
|       | 9 09:39  |        |   |   |
|       | PST      |        |   |   |
+-------+----------+--------+---+---+
 
 
 
+---+---+
|   |   |
+---+---+
 
 
 
+-----------------------------------+-------+----------+-------+---+---+
|   pantoprazole (PROTONIX) EC      | Given | 2/15/201 | 40 mg |   |   |
| tablet 40 mg  40 mg, Oral, Every  |       | 9 06:25  |       |   |   |
| Morning Before Breakfast, First   |       | PST      |       |   |   |
| dose on Fri 2/15/19 at 0630       |       |          |       |   |   |
+-----------------------------------+-------+----------+-------+---+---+
 
 
 
+---+---+
|   |   |
+---+---+
 
 
 
+-----------------------------------+-------+----------+---------+---+---+
|   rOPINIRole (REQUIP) tablet 0.75 | Given |  | 0.75 mg |   |   |
|  mg  0.75 mg, Oral, Nightly,      |       | 9 23:39  |         |   |   |
| First dose on u 19 at 2200 |       | PST      |         |   |   |
+-----------------------------------+-------+----------+---------+---+---+
 
 
 
+---+---+
|   |   |
+---+---+
 
 
 
+-----------------------------------+-------+----------+--------+---+---+
|   sodium chloride (PF) 0.9 %      | Given |  | 10 mLs |   |   |
| flush 10 mL  10 mL, Intravenous,  |       | 9 23:39  |        |   |   |
| Every 8 Hours, First dose on Thu  |       | PST      |        |   |   |
| 19 at 2300                   |       |          |        |   |   |
+-----------------------------------+-------+----------+--------+---+---+
 
 
 
+-------+----------+--------+---+---+
| Given | 2/15/201 | 10 mLs |   |   |
 
|       | 9 06:26  |        |   |   |
|       | PST      |        |   |   |
+-------+----------+--------+---+---+
 
 
 
+---+---+
|   |   |
+---+---+
 
 
 
+----------------------------------+---------+----------+----------+---+---+
|   vancomycin (VANCOCIN) 1500     | New Bag |  | 1,500 mg |   |   |
| mg/250 mL IVPB  1,500 mg,        |         | 9 18:41  |          |   |   |
| Intravenous, Administer over 90  |         | PST      |          |   |   |
| Minutes, Once, Thu 19 at    |         |          |          |   |   |
| 1700, For 1 dose                 |         |          |          |   |   |
+----------------------------------+---------+----------+----------+---+---+
 
 
 
+---+---+
|   |   |
+---+---+
 in this encounter

## 2019-04-09 NOTE — XMS
Encounter Summary
  Created on: 2019
 
 Cherie Villalobos
 External Reference #: [id number]
 : 48
 Sex: Female
 
 Demographics
 
 
+-----------------------+--------------------------+
| Address               | [address]               |
|                       | [place]  [address] |
+-----------------------+--------------------------+
| Home Phone            | [phone]          |
+-----------------------+--------------------------+
| Preferred Language    | Unknown                  |
+-----------------------+--------------------------+
| Marital Status        |                   |
+-----------------------+--------------------------+
| Oriental orthodox Affiliation | 1013                     |
+-----------------------+--------------------------+
| Race                  | Unknown                  |
+-----------------------+--------------------------+
| Ethnic Group          | Unknown                  |
+-----------------------+--------------------------+
 
 
 Author
 
 
+--------------+--------------------------------------------+
| Author       | [facility]  |
|              | [facility] [place]                                |
+--------------+--------------------------------------------+
| Organization | [facility]  |
|              | and [place]                                |
+--------------+--------------------------------------------+
| Address      | Unknown                                    |
+--------------+--------------------------------------------+
| Phone        | Unavailable                                |
+--------------+--------------------------------------------+
 
 
 
 Support
 
 
+---------------+--------------+---------------------+-----------------+
| Name          | Relationship | Address             | Phone           |
+---------------+--------------+---------------------+-----------------+
| Anoop Villalobos | ECON         | [address], | [phone] |
|               |              |  [place]  [address]     |                 |
+---------------+--------------+---------------------+-----------------+
| Jennifer Dickson | ECON         | [place], [place]       | [phone] |
|               |              | [phone]               |                 |
+---------------+--------------+---------------------+-----------------+
 
 
 
 
 Care Team Providers
 
 
+--------------------------+------+-----------------+
| Care Team Member Name    | Role | Phone           |
+--------------------------+------+-----------------+
| Araseli Montelongo Activity  | PCP  | [phone] |
| Professional             |      |                 |
+--------------------------+------+-----------------+
 
 
 
 Reason for Visit
 
 
+-----------+---------------------------------+
| Reason    | Comments                        |
+-----------+---------------------------------+
| Lab Order | Pt due for labs prior to appt.  |
+-----------+---------------------------------+
 
 
 
 Encounter Details
 
 
+--------+-----------+----------------------+----------------------+--------------------+
| Date   | Type      | Department           | Care Team            | Description        |
+--------+-----------+----------------------+----------------------+--------------------+
| / | Telephone |   [facility]          |   Johnie John, | Lab Order (Pt due  |
|    |           | CARDIOLOGY  [address]    |  MD  [address] [place] | for labs prior to  |
|        |           | [place], |  St.  [place],   | appt. )            |
|        |           |  [place] [address]       | [place] [address]             |                    |
|        |           | [phone]         | [phone]         |                    |
|        |           |                      | [phone] (Fax)   |                    |
+--------+-----------+----------------------+----------------------+--------------------+
 
 
 
 Social History
 
 
+---------------+------------+-----------+--------+------------------+
| Tobacco Use   | Types      | Packs/Day | Years  | Date             |
|               |            |           | Used   |                  |
+---------------+------------+-----------+--------+------------------+
| Former Smoker | Cigarettes | 1         | 30     | Quit: 2004 |
+---------------+------------+-----------+--------+------------------+
 
 
 
+---------------------+---+---+---+
| Smokeless Tobacco:  |   |   |   |
| Never Used          |   |   |   |
+---------------------+---+---+---+
 
 
 
 
+-------------+-----------+---------+----------+
| Alcohol Use | Drinks/We | oz/Week | Comments |
|             | ek        |         |          |
+-------------+-----------+---------+----------+
| No          |           |         |          |
+-------------+-----------+---------+----------+
 
 
 
+------------------+---------------+
| Sex Assigned at  | Date Recorded |
| Birth            |               |
+------------------+---------------+
| Not on file      |               |
+------------------+---------------+
 
 
 
+----------------+-------------+-------------+
| Job Start Date | Occupation  | Industry    |
+----------------+-------------+-------------+
| Not on file    | Not on file | Not on file |
+----------------+-------------+-------------+
 
 
 
+----------------+--------------+------------+
| Travel History | Travel Start | Travel End |
+----------------+--------------+------------+
 
 
 
+-------------------------------------+
| No recent travel history available. |
+-------------------------------------+
 documented as of this encounter
 
 Functional Status
 
 
+---------------------------------------------+----------+--------------------+
| Functional Status                           | Response | Date of Assessment |
+---------------------------------------------+----------+--------------------+
| Are you deaf or do you have serious         | No       | 2018         |
| difficulty hearing?                         |          |                    |
+---------------------------------------------+----------+--------------------+
| Are you blind or do you have serious        | No       | 2018         |
| difficulty seeing, even when wearing        |          |                    |
| glasses?                                    |          |                    |
+---------------------------------------------+----------+--------------------+
| Do you have serious difficulty walking or   | No       | 2018         |
| climbing stairs? (5 years old or older)     |          |                    |
+---------------------------------------------+----------+--------------------+
| Do you have difficulty dressing or bathing? | No       | 2018         |
|  (5 years old or older)                     |          |                    |
+---------------------------------------------+----------+--------------------+
| Because of a physical, mental, or emotional | No       | 2018         |
|  condition, do you have difficulty doing    |          |                    |
| errands alone such as visiting a doctor's   |          |                    |
 
| office or shopping? [15 years old or        |          |                    |
| older)]                                     |          |                    |
+---------------------------------------------+----------+--------------------+
 
 
 
+---------------------------------------------+----------+--------------------+
| Cognitive Status                            | Response | Date of Assessment |
+---------------------------------------------+----------+--------------------+
| Because of a physical, mental, or emotional | No       | 2018         |
|  condition, do you have serious difficulty  |          |                    |
| concentrating, remembering, or making       |          |                    |
| decisions? (5 years old or older)           |          |                    |
+---------------------------------------------+----------+--------------------+
 documented as of this encounter
 
 Plan of Treatment
 
 
+--------+---------+------------+----------------------+-------------+
| Date   | Type    | Specialty  | Care Team            | Description |
+--------+---------+------------+----------------------+-------------+
| / | Office  | Cardiology |   Johnie John, |             |
| 2019   | Visit   |            |  MD  [address] |             |
|        |         |            |  [place],   |             |
|        |         |            | [place] [address]             |             |
|        |         |            | [phone]         |             |
|        |         |            | [phone] (Fax)   |             |
+--------+---------+------------+----------------------+-------------+
 
 
 
+-------------+--------+----------------------+----------------------+
| Name        | Priori | Associated Diagnoses | Order Schedule       |
|             | ty     |                      |                      |
+-------------+--------+----------------------+----------------------+
| Lipid Panel | Routin |   Mixed              | 1 Occurrences        |
|             | e      | hyperlipidemia       | starting 2019  |
|             |        |                      | until 2020     |
+-------------+--------+----------------------+----------------------+
 documented as of this encounter
 
 Visit Diagnoses
 
 
+----------------------------------+
| Diagnosis                        |
+----------------------------------+
|   Mixed hyperlipidemia - Primary |
+----------------------------------+
 documented in this encounter

## 2019-04-09 NOTE — XMS
Encounter Summary
  Created on: 2019
 
 Cherie Villalobos
 External Reference #: [id number]
 : 48
 Sex: Female
 
 Demographics
 
 
+-----------------------+--------------------------+
| Address               | [address]               |
|                       | [place]  [address] |
+-----------------------+--------------------------+
| Home Phone            | [phone]          |
+-----------------------+--------------------------+
| Preferred Language    | Unknown                  |
+-----------------------+--------------------------+
| Marital Status        |                   |
+-----------------------+--------------------------+
| Taoism Affiliation | 1013                     |
+-----------------------+--------------------------+
| Race                  | Unknown                  |
+-----------------------+--------------------------+
| Ethnic Group          | Unknown                  |
+-----------------------+--------------------------+
 
 
 Author
 
 
+--------------+-----------------------+
| Author       | Sherry In[employer] |
+--------------+-----------------------+
| Organization | [facility] |
+--------------+-----------------------+
| Address      | Unknown               |
+--------------+-----------------------+
| Phone        | Unavailable           |
+--------------+-----------------------+
 
 
 
 Support
 
 
+---------------+--------------+---------------------+-----------------+
| Name          | Relationship | Address             | Phone           |
+---------------+--------------+---------------------+-----------------+
| Anoop Villalobos | ECON         | [place], | [phone] |
|               |              |  [place]  [address]     |                 |
+---------------+--------------+---------------------+-----------------+
| Jennifer Dickson | ECON         | [place]       | [phone] |
|               |              | [phone]               |                 |
+---------------+--------------+---------------------+-----------------+
 
 
 
 
 Care Team Providers
 
 
+-----------------------+------+-----------------+
| Care Team Member Name | Role | Phone           |
+-----------------------+------+-----------------+
| Ivy Couch DO      | PCP  | [phone] |
+-----------------------+------+-----------------+
 
 
 
 Reason for Visit
 
 
+--------+------------------------------------------+
| Reason | Comments                                 |
+--------+------------------------------------------+
| Other  | Requesting status on form for shoe order |
+--------+------------------------------------------+
 
 
 
 Encounter Details
 
 
+--------+-----------+----------------------+----------------------+---------------------+
| Date   | Type      | Department           | Care Team            | Description         |
+--------+-----------+----------------------+----------------------+---------------------+
| / | Telephone |   [facility] |   Srinivasan Jordan,  | Other (Requesting   |
| 2019   |           |  [address] Ankle  [address]      | DPM  [address], | status on form for  |
|        |           | [address]   |  [address]  [place],  | shoe order)         |
|        |           | [place], [place]         | [place] [address]             |                     |
|        |           | [address]           | [phone]         |                     |
|        |           | [phone]         | [phone] (Fax)   |                     |
+--------+-----------+----------------------+----------------------+---------------------+
 
 
 
 Social History
 
 
+---------------+------------+-----------+--------+------------------+
| Tobacco Use   | Types      | Packs/Day | Years  | Date             |
|               |            |           | Used   |                  |
+---------------+------------+-----------+--------+------------------+
| Former Smoker | Cigarettes | 1         | 30     | Quit: 2004 |
+---------------+------------+-----------+--------+------------------+
 
 
 
+---------------------+---+---+---+
| Smokeless Tobacco:  |   |   |   |
| Never Used          |   |   |   |
+---------------------+---+---+---+
 
 
 
+------------------------------+
| Comments: quite smoking  |
 
+------------------------------+
 
 
 
+-------------+-----------+---------+----------+
| Alcohol Use | Drinks/We | oz/Week | Comments |
|             | ek        |         |          |
+-------------+-----------+---------+----------+
| No          |           |         |          |
+-------------+-----------+---------+----------+
 
 
 
+------------------+---------------+
| Sex Assigned at  | Date Recorded |
| Birth            |               |
+------------------+---------------+
| Not on file      |               |
+------------------+---------------+
 as of this encounter
 
 Plan of Treatment
 
 
+--------+---------+-----------+----------------------+-------------+
| Date   | Type    | Specialty | Care Team            | Description |
+--------+---------+-----------+----------------------+-------------+
| 04/10/ | Office  | Podiatry  |   Srinivasan Jordan,  |             |
|    | Visit   |           | DPM  [address], |             |
|        |         |           |  [address]  [facility]  |             |
|        |         |           | [place] [address]             |             |
|        |         |           | [phone]         |             |
|        |         |           | [phone] (Fax)   |             |
+--------+---------+-----------+----------------------+-------------+
 as of this encounter
 
 Visit Diagnoses
 Not on filein this encounter

## 2019-04-09 NOTE — XMS
Encounter Summary
  Created on: 2019
 
 Cherie Villalobos
 External Reference #: [id number]
 : 48
 Sex: Female
 
 Demographics
 
 
+-----------------------+--------------------------+
| Address               | [address]               |
|                       | [place]  [address] |
+-----------------------+--------------------------+
| Home Phone            | [phone]          |
+-----------------------+--------------------------+
| Preferred Language    | Unknown                  |
+-----------------------+--------------------------+
| Marital Status        |                   |
+-----------------------+--------------------------+
| Shinto Affiliation | 1013                     |
+-----------------------+--------------------------+
| Race                  | Unknown                  |
+-----------------------+--------------------------+
| Ethnic Group          | Unknown                  |
+-----------------------+--------------------------+
 
 
 Author
 
 
+--------------+-----------------------+
| Author       | Sherry [employer] |
+--------------+-----------------------+
| Organization | [facility] |
+--------------+-----------------------+
| Address      | Unknown               |
+--------------+-----------------------+
| Phone        | Unavailable           |
+--------------+-----------------------+
 
 
 
 Support
 
 
+---------------+--------------+---------------------+-----------------+
| Name          | Relationship | Address             | Phone           |
+---------------+--------------+---------------------+-----------------+
| Anoop Villalobos | ECON         | [place], | [phone] |
|               |              |  [place]  [address]     |                 |
+---------------+--------------+---------------------+-----------------+
| Jennifer Dickson | ECON         | [place]       | [phone] |
|               |              | [phone]               |                 |
+---------------+--------------+---------------------+-----------------+
 
 
 
 
 Care Team Providers
 
 
+-----------------------+------+-----------------+
| Care Team Member Name | Role | Phone           |
+-----------------------+------+-----------------+
| Ivy Couch DO      | PCP  | [phone] |
+-----------------------+------+-----------------+
 
 
 
 Encounter Details
 
 
+--------+------------+----------------------+-----------+-------------+
| Date   | Type       | Department           | Care Team | Description |
+--------+------------+----------------------+-----------+-------------+
| / | Procedure  |   [facility]    |           |             |
| 2019   | Pass       | [facility] [address]   |           |             |
|        |            | [address] |           |             |
|        |            |   [address]     |           |             |
|        |            | [place] [address]   |           |             |
|        |            | [phone]         |           |             |
+--------+------------+----------------------+-----------+-------------+
 
 
 
 Social History
 
 
+---------------+------------+-----------+--------+------------------+
| Tobacco Use   | Types      | Packs/Day | Years  | Date             |
|               |            |           | Used   |                  |
+---------------+------------+-----------+--------+------------------+
| Former Smoker | Cigarettes | 1         | 30     | Quit: 2004 |
+---------------+------------+-----------+--------+------------------+
 
 
 
+---------------------+---+---+---+
| Smokeless Tobacco:  |   |   |   |
| Never Used          |   |   |   |
+---------------------+---+---+---+
 
 
 
+------------------------------+
| Comments: quite smoking  |
+------------------------------+
 
 
 
+-------------+-----------+---------+----------+
| Alcohol Use | Drinks/We | oz/Week | Comments |
|             | ek        |         |          |
+-------------+-----------+---------+----------+
| No          |           |         |          |
+-------------+-----------+---------+----------+
 
 
 
 
+------------------+---------------+
| Sex Assigned at  | Date Recorded |
| Birth            |               |
+------------------+---------------+
| Not on file      |               |
+------------------+---------------+
 as of this encounter
 
 Plan of Treatment
 
 
+--------+---------+-----------+----------------------+-------------+
| Date   | Type    | Specialty | Care Team            | Description |
+--------+---------+-----------+----------------------+-------------+
| 04/10/ | Office  | Podiatry  |   Srinivasan Jordan,  |             |
| 2019   | Visit   |           | DPM  [address], |             |
|        |         |           |  [place],  |             |
|        |         |           | [place] [address]             |             |
|        |         |           | [phone]         |             |
|        |         |           | [phone] (Fax)   |             |
+--------+---------+-----------+----------------------+-------------+
 as of this encounter
 
 Visit Diagnoses
 Not on filein this encounter

## 2019-04-09 NOTE — XMS
Encounter Summary
  Created on: 2019
 
 Cherie Villalobos
 External Reference #: [id number]
 : 48
 Sex: Female
 
 Demographics
 
 
+-----------------------+--------------------------+
| Address               | [address]               |
|                       | [place]  [address] |
+-----------------------+--------------------------+
| Home Phone            | [phone]          |
+-----------------------+--------------------------+
| Preferred Language    | Unknown                  |
+-----------------------+--------------------------+
| Marital Status        |                   |
+-----------------------+--------------------------+
| Congregational Affiliation | 1013                     |
+-----------------------+--------------------------+
| Race                  | Unknown                  |
+-----------------------+--------------------------+
| Ethnic Group          | Unknown                  |
+-----------------------+--------------------------+
 
 
 Author
 
 
+--------------+-----------------------+
| Author       | [email or web address] |
+--------------+-----------------------+
| Organization | [facility] |
+--------------+-----------------------+
| Address      | Unknown               |
+--------------+-----------------------+
| Phone        | Unavailable           |
+--------------+-----------------------+
 
 
 
 Support
 
 
+---------------+--------------+---------------------+-----------------+
| Name          | Relationship | Address             | Phone           |
+---------------+--------------+---------------------+-----------------+
| Anoop Villalobos | ECON         | [place], | [phone] |
|               |              |  [place]  [address]     |                 |
+---------------+--------------+---------------------+-----------------+
| Jennifer Dickson | ECON         | [place]       | [phone] |
|               |              | [address]               |                 |
+---------------+--------------+---------------------+-----------------+
 
 
 
 
 Care Team Providers
 
 
+-----------------------+------+-----------------+
| Care Team Member Name | Role | Phone           |
+-----------------------+------+-----------------+
| Ivy Couch DO      | PCP  | [phone] |
+-----------------------+------+-----------------+
 
 
 
 Reason for Visit
 
 
+-----------+-------------+
| Reason    | Comments    |
+-----------+-------------+
| Follow-up | appointment |
+-----------+-------------+
 
 
 
 Encounter Details
 
 
+--------+-----------+----------------------+----------------------+---------------+
| Date   | Type      | Department           | Care Team            | Description   |
+--------+-----------+----------------------+----------------------+---------------+
| / | Telephone |   [facility] |   Srinivasan Jordan,  | Follow-up     |
| 2019   |           |  and Ankle  [address]      | DPM  [address], | (appointment) |
|        |           | [address]   |  [address]  [place],  |               |
|        |           | [place], [place]         | [place] [address]             |               |
|        |           | [address]           | [phone]         |               |
|        |           | [phone]         | [phone] (Fax)   |               |
+--------+-----------+----------------------+----------------------+---------------+
 
 
 
 Social History
 
 
+---------------+------------+-----------+--------+------------------+
| Tobacco Use   | Types      | Packs/Day | Years  | Date             |
|               |            |           | Used   |                  |
+---------------+------------+-----------+--------+------------------+
| Former Smoker | Cigarettes | 1         | 30     | Quit: 2004 |
+---------------+------------+-----------+--------+------------------+
 
 
 
+---------------------+---+---+---+
| Smokeless Tobacco:  |   |   |   |
| Never Used          |   |   |   |
+---------------------+---+---+---+
 
 
 
+------------------------------+
| Comments: quite smoking 2004 |
 
+------------------------------+
 
 
 
+-------------+-----------+---------+----------+
| Alcohol Use | Drinks/We | oz/Week | Comments |
|             | ek        |         |          |
+-------------+-----------+---------+----------+
| No          |           |         |          |
+-------------+-----------+---------+----------+
 
 
 
+------------------+---------------+
| Sex Assigned at  | Date Recorded |
| Birth            |               |
+------------------+---------------+
| Not on file      |               |
+------------------+---------------+
 as of this encounter
 
 Plan of Treatment
 
 
+--------+---------+-----------+----------------------+-------------+
| Date   | Type    | Specialty | Care Team            | Description |
+--------+---------+-----------+----------------------+-------------+
| 04/10/ | Office  | Podiatry  |   Srinivasan Jordan,  |             |
|    | Visit   |           | DPCLARE  [address], |             |
|        |         |           |  [address]  [place],  |             |
|        |         |           | [place] [address]             |             |
|        |         |           | [phone]         |             |
|        |         |           | [phone] (Fax)   |             |
+--------+---------+-----------+----------------------+-------------+
 as of this encounter
 
 Visit Diagnoses
 Not on filein this encounter

## 2019-04-09 NOTE — XMS
Encounter Summary
  Created on: 2019
 
 Cherie Villalobos
 External Reference #: [id number]
 : 48
 Sex: Female
 
 Demographics
 
 
+-----------------------+--------------------------+
| Address               | [address]               |
|                       | [place]  [address] |
+-----------------------+--------------------------+
| Home Phone            | [phone]          |
+-----------------------+--------------------------+
| Preferred Language    | Unknown                  |
+-----------------------+--------------------------+
| Marital Status        |                   |
+-----------------------+--------------------------+
| Oriental orthodox Affiliation | 1013                     |
+-----------------------+--------------------------+
| Race                  | Unknown                  |
+-----------------------+--------------------------+
| Ethnic Group          | Unknown                  |
+-----------------------+--------------------------+
 
 
 Author
 
 
+--------------+--------------------------------------------+
| Author       | [facility]  |
|              | [facility] [place]                                |
+--------------+--------------------------------------------+
| Organization | [facility]  |
|              | [facility] [place]                                |
+--------------+--------------------------------------------+
| Address      | Unknown                                    |
+--------------+--------------------------------------------+
| Phone        | Unavailable                                |
+--------------+--------------------------------------------+
 
 
 
 Support
 
 
+---------------+--------------+---------------------+-----------------+
| Name          | Relationship | Address             | Phone           |
+---------------+--------------+---------------------+-----------------+
| Anoop Villalobos | ECON         | [address], | [phone] |
|               |              |  [place]  [address]     |                 |
+---------------+--------------+---------------------+-----------------+
| Jennifer Dickson | ECON         | [place], [place]       | [phone] |
|               |              | [phone]               |                 |
+---------------+--------------+---------------------+-----------------+
 
 
 
 
 Care Team Providers
 
 
+--------------------------+------+-----------------+
| Care Team Member Name    | Role | Phone           |
+--------------------------+------+-----------------+
| Araseli Montelongo Activity  | PCP  | [phone] |
| Professional             |      |                 |
+--------------------------+------+-----------------+
 
 
 
 Reason for Visit
 
 
+--------+------------------------------------+
| Reason | Comments                           |
+--------+------------------------------------+
| Other  | medication changed due to ER visit |
+--------+------------------------------------+
 
 
 
 Encounter Details
 
 
+--------+-----------+----------------------+----------------------+--------------------+
| Date   | Type      | Department           | Care Team            | Description        |
+--------+-----------+----------------------+----------------------+--------------------+
| / | Telephone |   [facility]          |   Johnie John, | Other (medication  |
| 2019   |           | CARDIOLOGY  [address]    |  MD  [address] | changed due to ER  |
|        |           | [address] [place]  [place], |  [address],   | visit)             |
|        |           |  [place] [address]       | [place] [address]             |                    |
|        |           | [phone]         | [phone]         |                    |
|        |           |                      | [phone] (Fax)   |                    |
+--------+-----------+----------------------+----------------------+--------------------+
 
 
 
 Social History
 
 
+---------------+------------+-----------+--------+------------------+
| Tobacco Use   | Types      | Packs/Day | Years  | Date             |
|               |            |           | Used   |                  |
+---------------+------------+-----------+--------+------------------+
| Former Smoker | Cigarettes | 1         | 30     | Quit: 2004 |
+---------------+------------+-----------+--------+------------------+
 
 
 
+---------------------+---+---+---+
| Smokeless Tobacco:  |   |   |   |
| Never Used          |   |   |   |
+---------------------+---+---+---+
 
 
 
 
+-------------+-----------+---------+----------+
| Alcohol Use | Drinks/We | oz/Week | Comments |
|             | ek        |         |          |
+-------------+-----------+---------+----------+
| No          |           |         |          |
+-------------+-----------+---------+----------+
 
 
 
+------------------+---------------+
| Sex Assigned at  | Date Recorded |
| Birth            |               |
+------------------+---------------+
| Not on file      |               |
+------------------+---------------+
 
 
 
+----------------+-------------+-------------+
| Job Start Date | Occupation  | Industry    |
+----------------+-------------+-------------+
| Not on file    | Not on file | Not on file |
+----------------+-------------+-------------+
 
 
 
+----------------+--------------+------------+
| Travel History | Travel Start | Travel End |
+----------------+--------------+------------+
 
 
 
+-------------------------------------+
| No recent travel history available. |
+-------------------------------------+
 documented as of this encounter
 
 Functional Status
 
 
+---------------------------------------------+----------+--------------------+
| Functional Status                           | Response | Date of Assessment |
+---------------------------------------------+----------+--------------------+
| Are you deaf or do you have serious         | No       | 2018         |
| difficulty hearing?                         |          |                    |
+---------------------------------------------+----------+--------------------+
| Are you blind or do you have serious        | No       | 2018         |
| difficulty seeing, even when wearing        |          |                    |
| glasses?                                    |          |                    |
+---------------------------------------------+----------+--------------------+
| Do you have serious difficulty walking or   | No       | 2018         |
| climbing stairs? (5 years old or older)     |          |                    |
+---------------------------------------------+----------+--------------------+
| Do you have difficulty dressing or bathing? | No       | 2018         |
|  (5 years old or older)                     |          |                    |
+---------------------------------------------+----------+--------------------+
| Because of a physical, mental, or emotional | No       | 2018         |
|  condition, do you have difficulty doing    |          |                    |
| errands alone such as visiting a doctor's   |          |                    |
 
| office or shopping? [15 years old or        |          |                    |
| older)]                                     |          |                    |
+---------------------------------------------+----------+--------------------+
 
 
 
+---------------------------------------------+----------+--------------------+
| Cognitive Status                            | Response | Date of Assessment |
+---------------------------------------------+----------+--------------------+
| Because of a physical, mental, or emotional | No       | 2018         |
|  condition, do you have serious difficulty  |          |                    |
| concentrating, remembering, or making       |          |                    |
| decisions? (5 years old or older)           |          |                    |
+---------------------------------------------+----------+--------------------+
 documented as of this encounter
 
 Plan of Treatment
 
 
+--------+---------+------------+----------------------+-------------+
| Date   | Type    | Specialty  | Care Team            | Description |
+--------+---------+------------+----------------------+-------------+
| / | Office  | Cardiology |   Johnie John, |             |
|    | Visit   |            |  MD  [address] |             |
|        |         |            |  [place],   |             |
|        |         |            | [place] [address]             |             |
|        |         |            | [phone]         |             |
|        |         |            | [phone] (Fax)   |             |
+--------+---------+------------+----------------------+-------------+
 documented as of this encounter
 
 Visit Diagnoses
 Not on filedocumented in this encounter

## 2019-04-09 NOTE — XMS
Encounter Summary
  Created on: 2019
 
 Cherie Villalobos
 External Reference #: [id number]
 : 48
 Sex: Female
 
 Demographics
 
 
+-----------------------+--------------------------+
| Address               | [address]               |
|                       | [place]  [address] |
+-----------------------+--------------------------+
| Home Phone            | [phone]          |
+-----------------------+--------------------------+
| Preferred Language    | Unknown                  |
+-----------------------+--------------------------+
| Marital Status        |                   |
+-----------------------+--------------------------+
| Uats[employer] Affiliation | 1013                     |
+-----------------------+--------------------------+
| Race                  | Unknown                  |
+-----------------------+--------------------------+
| Ethnic Group          | Unknown                  |
+-----------------------+--------------------------+
 
 
 Author
 
 
+--------------+-----------------------+
| Author       | [facility] |
+--------------+-----------------------+
| Organization | [facility] |
+--------------+-----------------------+
| Address      | Unknown               |
+--------------+-----------------------+
| Phone        | Unavailable           |
+--------------+-----------------------+
 
 
 
 Support
 
 
+---------------+--------------+---------------------+-----------------+
| Name          | Relationship | Address             | Phone           |
+---------------+--------------+---------------------+-----------------+
| Anoop Villalobos | ECON         | [place], | [phone] |
|               |              |  [place]  [address]     |                 |
+---------------+--------------+---------------------+-----------------+
| Jennifer Dickson | ECON         | [place]       | [phone] |
|               |              | [address]               |                 |
+---------------+--------------+---------------------+-----------------+
 
 
 
 
 Care Team Providers
 
 
+-----------------------+------+-----------------+
| Care Team Member Name | Role | Phone           |
+-----------------------+------+-----------------+
| Ivy Couch DO      | PCP  | [phone] |
+-----------------------+------+-----------------+
 
 
 
 Reason for Visit
 
 
+--------+--------------------------------+
| Reason | Comments                       |
+--------+--------------------------------+
| Other  | [facility] medical records request |
+--------+--------------------------------+
 
 
 
 Encounter Details
 
 
+--------+-------------+----------------------+---------------------+------------------+
| Date   | Type        | Department           | Care Team           | Description      |
+--------+-------------+----------------------+---------------------+------------------+
| / | Documentati |   [facility]      |   Peabody, Jessica  | Other [facility]    |
| 2019   | on Only     | [address]        | [facility] [address]               | medical records  |
|        |             | [address]   |                     | request)         |
|        |             | [place] [address]   |                     |                  |
|        |             | [phone]         |                     |                  |
+--------+-------------+----------------------+---------------------+------------------+
 
 
 
 Social History
 
 
+---------------+------------+-----------+--------+------------------+
| Tobacco Use   | Types      | Packs/Day | Years  | Date             |
|               |            |           | Used   |                  |
+---------------+------------+-----------+--------+------------------+
| Former Smoker | Cigarettes | 1         | 30     | Quit: 2004 |
+---------------+------------+-----------+--------+------------------+
 
 
 
+---------------------+---+---+---+
| Smokeless Tobacco:  |   |   |   |
| Never Used          |   |   |   |
+---------------------+---+---+---+
 
 
 
+------------------------------+
| Comments: quite smoking 2004 |
+------------------------------+
 
 
 
 
+-------------+-----------+---------+----------+
| Alcohol Use | Drinks/We | oz/Week | Comments |
|             | ek        |         |          |
+-------------+-----------+---------+----------+
| No          |           |         |          |
+-------------+-----------+---------+----------+
 
 
 
+------------------+---------------+
| Sex Assigned at  | Date Recorded |
| Birth            |               |
+------------------+---------------+
| Not on file      |               |
+------------------+---------------+
 as of this encounter
 
 Progress Notes
 Peabody, Jessica M, RN - 2019  6:22 PM [facility] medical records request was denied. 
Fax sent forward to [facility] (F#[phone]). Fax confirmation received. in this en
counter
 
 Plan of Treatment
 
 
+--------+---------+-----------+----------------------+-------------+
| Date   | Type    | Specialty | Care Team            | Description |
+--------+---------+-----------+----------------------+-------------+
| 04/10/ | Office  | Podiatry  |   Srinivasan Jordan,  |             |
|    | Visit   |           | DPM  [address], |             |
|        |         |           |  [address]  [place],  |             |
|        |         |           | [place] [address]             |             |
|        |         |           | [phone]         |             |
|        |         |           | [phone] (Fax)   |             |
+--------+---------+-----------+----------------------+-------------+
 as of this encounter
 
 Visit Diagnoses
 Not on filein this encounter

## 2019-04-09 NOTE — XMS
Clinical Summary
  Created on: 2019
 
 Cherie Villalobos
 External Reference #: [id number]
 : 48
 Sex: Female
 
 Demographics
 
 
+-----------------------+--------------------------+
| Address               | [address]               |
|                       | [place]  [address] |
+-----------------------+--------------------------+
| Home Phone            | [phone]          |
+-----------------------+--------------------------+
| Preferred Language    | Unknown                  |
+-----------------------+--------------------------+
| Marital Status        |                   |
+-----------------------+--------------------------+
| Orthodox Affiliation | 1013                     |
+-----------------------+--------------------------+
| Race                  | Unknown                  |
+-----------------------+--------------------------+
| Ethnic Group          | Unknown                  |
+-----------------------+--------------------------+
 
 
 Author
 
 
+--------------+--------------------------------------------+
| Author       | [facility]  |
|              | [facility] [place]                                |
+--------------+--------------------------------------------+
| Organization | [facility]  |
|              | and [place]                                |
+--------------+--------------------------------------------+
| Address      | Unknown                                    |
+--------------+--------------------------------------------+
| Phone        | Unavailable                                |
+--------------+--------------------------------------------+
 
 
 
 Support
 
 
+---------------+--------------+---------------------+-----------------+
| Name          | Relationship | Address             | Phone           |
+---------------+--------------+---------------------+-----------------+
| Anoop Villalobos | ECON         | [address] [facility] [place], | [phone] |
|               |              |  [place]  [address]     |                 |
+---------------+--------------+---------------------+-----------------+
| Jennifer Dickson | ECON         | [place], [place]       | [phone] |
|               |              | [phone]               |                 |
+---------------+--------------+---------------------+-----------------+
 
 
 
 
 Care Team Providers
 
 
+--------------------------+------+-----------------+
| Care Team Member Name    | Role | Phone           |
+--------------------------+------+-----------------+
| Araseli Montelongo [employer]  | PP   | [phone] |
| Professional             |      |                 |
+--------------------------+------+-----------------+
 
 
 
 Allergies
 
 
+---------------------+----------------------+----------+----------+----------------------+
| Active Allergy      | Reactions            | Severity | Noted    | Comments             |
|                     |                      |          | Date     |                      |
+---------------------+----------------------+----------+----------+----------------------+
| Digoxin And Related | Other (See Comments) | High     | 20 |   Toxic, patient     |
|                     |                      |          | 18       | states her eyes were |
|                     |                      |          |          |  also affected "she  |
|                     |                      |          |          | seen yellow          |
|                     |                      |          |          | everywhere"          |
+---------------------+----------------------+----------+----------+----------------------+
| Morphine            | Other (See Comments) | High     | 20 |   Hallucinations     |
|                     |                      |          | 18       |                      |
+---------------------+----------------------+----------+----------+----------------------+
| Penicillins         | Hives                | Medium   | 20 |                      |
|                     |                      |          | 18       |                      |
+---------------------+----------------------+----------+----------+----------------------+
| Pantoprazole Sodium | Nausea And Vomiting  | Medium   | 20 |                      |
|                     |                      |          | 18       |                      |
+---------------------+----------------------+----------+----------+----------------------+
| Pseudoephedrine     | Anxiety              | High     | 20 |                      |
|                     |                      |          | 18       |                      |
+---------------------+----------------------+----------+----------+----------------------+
| Quinapril Hcl       | Anaphylaxis          | High     | 20 |                      |
|                     |                      |          | 18       |                      |
+---------------------+----------------------+----------+----------+----------------------+
 
 
 
 Medications
 
 
+----------------------+----------------------+-----------+---------+------+------+-------+
| Medication           | Sig                  | Dispensed | Refills | Star | End  | Statu |
|                      |                      |           |         | t    | Date | s     |
|                      |                      |           |         | Date |      |       |
+----------------------+----------------------+-----------+---------+------+------+-------+
|   cholecalciferol    | Take 1 capsule by    |           | 0       |      |      | Activ |
| (VITAMIN D-3) 5000   | mouth Every other    |           |         |      |      | e     |
| units TABS           | day.                 |           |         |      |      |       |
+----------------------+----------------------+-----------+---------+------+------+-------+
|   nortriptyline      | Take 25 mg by mouth  |           | 0       |      |      | Activ |
| (PAMELOR) 25 mg      | 2 times daily. 1     |           |         |      |      | e     |
 
| capsule              | tablet in AM and 3   |           |         |      |      |       |
|                      | tablets in Evening   |           |         |      |      |       |
+----------------------+----------------------+-----------+---------+------+------+-------+
|   BD PEN NEEDLE LISA | Inject 4 Sticks      |           | 0       | 01/0 |      | Activ |
|  U/F 32G X 4 MM MISC | under the skin 4     |           |         | 9/20 |      | e     |
|                      | times daily as       |           |         | 18   |      |       |
|                      | needed.              |           |         |      |      |       |
+----------------------+----------------------+-----------+---------+------+------+-------+
|   oxybutynin         | Take 2.5 mg by mouth |           | 0       |      |      | Activ |
| (DITROPAN) 5 mg      |  2 times daily.      |           |         |      |      | e     |
| tablet               |                      |           |         |      |      |       |
+----------------------+----------------------+-----------+---------+------+------+-------+
|   prochlorperazine   | Take 5 mg by mouth   |           | 0       |      |      | Activ |
| (COMPAZINE) 5 mg     | Twice  daily as      |           |         |      |      | e     |
| tablet               | needed.              |           |         |      |      |       |
+----------------------+----------------------+-----------+---------+------+------+-------+
|   rOPINIRole         | Take 0.75 mg by      |           | 0       |      |      | Activ |
| (REQUIP) 0.25 mg     | mouth nightly. Takes |           |         |      |      | e     |
| tablet               |  3 tablets at night  |           |         |      |      |       |
+----------------------+----------------------+-----------+---------+------+------+-------+
|   insulin aspart     | Inject 1-6 Units     |   10 mL   | 0       | 02/2 |      | Activ |
| (NOVOLOG FLEXPEN)    | under the skin 3     |           |         | 2/20 |      | e     |
| 100 units/mL         | times daily (with    |           |         | 18   |      |       |
| injection            | meals). CORRECTION   |           |         |      |      |       |
| penIndications: Type | SCALE: Novolog 0-6   |           |         |      |      |       |
|  2 diabetes mellitus | units before meals,  |           |         |      |      |       |
|  with other          | bedtime SC.  Add to  |           |         |      |      |       |
| specified            | nutritional dose if  |           |         |      |      |       |
| complication, with   | applicable.  [BG <   |           |         |      |      |       |
| long-term current    | 150: None]  [BG      |           |         |      |      |       |
| use of insulin (HCC) | 150-200: 1 units]    |           |         |      |      |       |
|                      | [-250: 2       |           |         |      |      |       |
|                      | units]  [-300: |           |         |      |      |       |
|                      |  3 units]  [BG       |           |         |      |      |       |
|                      | 301-350: 4 units]    |           |         |      |      |       |
|                      | [-400: 5       |           |         |      |      |       |
|                      | units]  [BG > 400: 6 |           |         |      |      |       |
|                      |  units and call PCP] |           |         |      |      |       |
+----------------------+----------------------+-----------+---------+------+------+-------+
|   aspirin 81 mg      | Take 1 tablet by     |   90      | 0       | 02/2 |      | Activ |
| chewable tablet      | mouth Daily.         | tablet    |         | 4/20 |      | e     |
|                      |                      |           |         | 18   |      |       |
+----------------------+----------------------+-----------+---------+------+------+-------+
|   insulin degludec   | Inject 4 Units under |   1 pen   | 3       | 03/1 |      | Activ |
| (TRESIBA FLEXTOUCH)  |  the skin every      |           |         | 2/20 |      | e     |
| 100 units/mL         | evening.             |           |         | 18   |      |       |
| injectionIndications |                      |           |         |      |      |       |
| : Type 2 diabetes    |                      |           |         |      |      |       |
| mellitus with        |                      |           |         |      |      |       |
| chronic kidney       |                      |           |         |      |      |       |
| disease on chronic   |                      |           |         |      |      |       |
| dialysis, with       |                      |           |         |      |      |       |
| long-term current    |                      |           |         |      |      |       |
| use of insulin (HCC) |                      |           |         |      |      |       |
+----------------------+----------------------+-----------+---------+------+------+-------+
|   acetaminophen      | Take 2 tablets by    |   120     | 0       | 03/1 |      | Activ |
| (TYLENOL) 325 mg     | mouth every 4 hours  | tablet    |         | 6/20 |      | e     |
| tablet               | as needed for Pain   |           |         | 18   |      |       |
|                      | (or fever >= 38.6 C  |           |         |      |      |       |
|                      | (101.5 F)).          |           |         |      |      |       |
 
+----------------------+----------------------+-----------+---------+------+------+-------+
|   LORazepam (ATIVAN) | Take 1 tablet by     |   60      | 0       | 03/1 |      | Activ |
|  1 mg tablet         | mouth every 8 hours  | tablet    |         | 6/20 |      | e     |
|                      | as needed for        |           |         | 18   |      |       |
|                      | Anxiety.             |           |         |      |      |       |
+----------------------+----------------------+-----------+---------+------+------+-------+
|   albuterol 5 mg/mL  | Take 0.5 mLs by      |   20 vial | 0       | 03/1 |      | Activ |
| nebulizer solution   | nebulization every 4 |           |         | 6/20 |      | e     |
|                      |  hours as needed for |           |         | 18   |      |       |
|                      |  Shortness of Breath |           |         |      |      |       |
|                      |  or Increased Work   |           |         |      |      |       |
|                      | of Breathing.        |           |         |      |      |       |
+----------------------+----------------------+-----------+---------+------+------+-------+
|   losartan (COZAAR)  | Take 100 mg by mouth |           | 0       |      |      | Activ |
| 100 MG tablet        |  Daily.              |           |         |      |      | e     |
+----------------------+----------------------+-----------+---------+------+------+-------+
|   esomeprazole       | 2 times daily.       |           | 0       | 05/0 |      | Activ |
| (NEXIUM) 40 mg       |                      |           |         | 2/20 |      | e     |
| capsule              |                      |           |         | 18   |      |       |
+----------------------+----------------------+-----------+---------+------+------+-------+
|   TRINTELLIX 10 MG   | Daily.               |           | 0       | 04/2 |      | Activ |
| tablet               |                      |           |         | 4/20 |      | e     |
|                      |                      |           |         | 18   |      |       |
+----------------------+----------------------+-----------+---------+------+------+-------+
|   nitroglycerin      | Place 1 tablet under |   100     | 3       | 05/0 | 05/0 | Activ |
| (NITROSTAT) 0.4 mg   |  the tongue every 5  | tablet    |         | 7/20 | 7/20 | e     |
| SL tablet            | minutes as needed.   |           |         | 18   | 19   |       |
+----------------------+----------------------+-----------+---------+------+------+-------+
|   atorvaSTATin       | Take 1 tablet by     |           | 0       | 05/1 |      | Activ |
| (LIPITOR) 80 MG      | mouth nightly.       |           |         | 8/20 |      | e     |
| tablet               |                      |           |         | 18   |      |       |
+----------------------+----------------------+-----------+---------+------+------+-------+
|   docusate-senna     | Take 1 tablet by     |           | 0       |      |      | Activ |
| (SENOKOT-S) 50-8.6   | mouth as needed for  |           |         |      |      | e     |
| mg per tablet        | Constipation.        |           |         |      |      |       |
+----------------------+----------------------+-----------+---------+------+------+-------+
|   furosemide (LASIX) | TAKE 1 TABLET BY     |   30      | 3       | 09/0 |      | Activ |
|  20 mg tablet        | MOUTH ONCE DAILY     | tablet    |         |  |      | e     |
|                      |                      |           |         | 18   |      |       |
+----------------------+----------------------+-----------+---------+------+------+-------+
|                      | Take 1 tablet by     |           | 0       | 08/2 |      | Activ |
| HYDROcodone-acetamin | mouth as needed.     |           |         | 1/20 |      | e     |
| ophen (NORCO) 5-325  |                      |           |         | 18   |      |       |
| mg per tablet        |                      |           |         |      |      |       |
+----------------------+----------------------+-----------+---------+------+------+-------+
|   ondansetron        | Take 1 tablet by     |           | 0       | 08/2 |      | Activ |
| (ZOFRAN ODT) 4 mg    | mouth as needed.     |           |         | 1/20 |      | e     |
| disintegrating       |                      |           |         | 18   |      |       |
| tablet               |                      |           |         |      |      |       |
+----------------------+----------------------+-----------+---------+------+------+-------+
|   warfarin           | Take 2 mg by mouth   |           | 0       |      |      | Activ |
| (COUMADIN) 2 mg      | Daily. 1  tablet  |           |         |      |      | e     |
| tablet               | daily                |           |         |      |      |       |
+----------------------+----------------------+-----------+---------+------+------+-------+
|   traMADol (ULTRAM)  | Take 50 mg by mouth  |           | 0       |      |      | Activ |
| 50 mg tablet         | as needed.           |           |         |      |      | e     |
+----------------------+----------------------+-----------+---------+------+------+-------+
|   carvedilol (COREG) | Take 1.5 tablets by  |   90      | 5       |  |      | Activ |
|  12.5 mg tablet      | mouth 2 times daily  | tablet    |         |  |      | e     |
|                      | (with breakfast &    |           |         | 18   |      |       |
 
|                      | dinner).             |           |         |      |      |       |
+----------------------+----------------------+-----------+---------+------+------+-------+
|   ondansetron        | Take 1 tablet by     |   24      | 0       |  |      | Activ |
| (ZOFRAN ODT) 4 mg    | mouth every 8 hours  | tablet    |         |  |      | e     |
| disintegrating       | as needed for        |           |         | 18   |      |       |
| tablet               | Nausea.              |           |         |      |      |       |
+----------------------+----------------------+-----------+---------+------+------+-------+
 
 
 
 Active Problems
 
 
+------------+------------+
| Problem    | Noted Date |
+------------+------------+
| Chest pain | 2018 |
+------------+------------+
 
 
 
+-------------------------------------------------------------------+
|   Overview:   Chest XR 9/15/2018 - Findings are suggestive of     |
| mild pulmonary edema with small pleural effusions.  Borderline    |
| cardiomegaly. Edward Dailey MD Stress test done on 18     |
| shows pharmacologic stress notable for baseline abnormal ECG      |
| without diagnostic changes post stress, mild LV enlargement with  |
| LVEF 33% reviewed, perfusion imaging notable for moderate sized   |
| high intensity area of reduced uptake involving the mid to apical |
|  inferior myocardium as well as the LV apex with minimal          |
| reversibility, consistent with patient's known occluded RCA,  no  |
| significant additional ischemia is noted, overall this is         |
| consistent with the patient's known history of multivessel CAD    |
| and ischemic cardiomyopathy.X-Ray chest 2/10/2019 shows small     |
| bilateral pleural effusions with some mild probable bibasilar     |
| atelectasis, no fulminant pulmonary edema or focal infiltrate is  |
| seen, by Tip Pinto DO.                                  |
+-------------------------------------------------------------------+
 
 
 
+-------------------------------+------------+
| ACS (acute coronary syndrome) | 09/15/2018 |
+-------------------------------+------------+
 
 
 
+-------------------------------------------------------------------+
|   Overview:   Echocardiogram on 2018 shows, mild left        |
| ventricular chamber dilatation with upper normal wall thickness,  |
| moderate global left ventricular systolic dysfunction with a      |
| visually estimated left ventricular ejection fraction of 30-35%,  |
| mild left atrial chamber enlargement, surgically repaired mitral  |
| valve with normal function, mild aortic valve regurgitation, mild |
|  pulmonary arterial hypertension, when compared with prior        |
| echocardiogram dated 18, no significant interval change, by |
|  Dejan Breaux MD.                                               |
+-------------------------------------------------------------------+
 
 
 
 
+------------------------------------------------------------------+------------+
| Implantable defibrillator reprogramming/check                    | 2018 |
+------------------------------------------------------------------+------------+
| ICD (implantable cardioverter-defibrillator) [employer]   | 2018 |
| 18 Dora                                                 |            |
+------------------------------------------------------------------+------------+
 
 
 
+-------------------------------------------------------------------+
|   Overview:   Formatting of this note might be different from the |
|  original. MODEL NAME MODEL# SERIAL# DATE IMPLANTED GENERATOR     |
| [employer] Dynagen EL ICD DF4 [id number] RV LEAD   |
| [employer] [place] [address] [place] [id number] 18           |
| Indication: Nonischemic dilated cardiopathy with persistent       |
| severely depressed left ventricular systolic function, LVEF of    |
| LVEF of 30%                                                       |
+-------------------------------------------------------------------+
 
 
 
+------------------+------------+
| Pleural effusion | 2018 |
+------------------+------------+
 
 
 
+-------------------------------------------------------------------+
|   Last Assessment & Plan:   Post MV repair and CABG x2 2018  |
| and recurrent pleural effusions and elevated ESR. Plan:Received 1 |
|  time dose of Colchicine Started on Prednisone per surgery CXR    |
| today shows stable bilateral pleural effusions                    |
|Started on Prednisone per surgery                                  |
|CXR today shows stable bilateral pleural effusions                 |
+-------------------------------------------------------------------+
 
 
 
+---------------------------------------+------------+
| Steroid-induced hyperglycemia         | 2018 |
+---------------------------------------+------------+
| Moderate protein-calorie malnutrition | 2018 |
+---------------------------------------+------------+
| Chronic systolic heart failure        | 2018 |
+---------------------------------------+------------+
 
 
 
+-------------------------------------------------------------------+
|   Overview:   Echocardiogram 2018 shows mild biatrial        |
| dilatation, mild left ventricular dilatation with a mild          |
| eccentric left ventricular hypertrophy, there is a moderate       |
| global hypokinesis of left ventricle, lvef is 30-35%, normal      |
| right ventricular size and wall thickness with a mildly reduced   |
| right ventricular systolic function, mildly thickened and         |
| calcified trileaflet aortic valve with adequate opening, there is |
|  a mild aortic valve insufficiency, mildly thickened and          |
| calcified mitral valve suggesting myxomatous change with evidence |
|  of mitral valve repair, there is at least moderate central       |
 
| mitral valve regurgitation, mild tricuspid valve regurgitation,   |
| mild to moderate pulmonary hypertension with a peak systolic      |
| pressure of 50-55 mmhg, normal ivc with normal respiratory        |
| collapse, when compared to echocardiography on 3/7/18, left       |
| ventricular systolicsystolic function is slightly                 |
| improved.Echocardiogram 2018 show overall left ventricular    |
| systolic function is severely impaired with, an EF between 20 -   |
| 25 %, there is mild aortic regurgitation, there is mild aortic    |
| stenosis present, mild-to-moderate tricuspid regurgitation        |
| present, there is mild pulmonary hypertension, pleural effusion   |
| present. Duglas Ornelas MD, FACC; [facility]   Last Assessment & Plan:  |
|   EF 25% s/p CABG on 18 but now down to 15%. Plan:Continue   |
| Coreg and Losartan Hemodialysis for fluid removal.Strict I/O and  |
| daily weights.                                                    |
+-------------------------------------------------------------------+
 
 
 
+------------------------------------------+------------+
| Stress hyperglycemia                     | 2018 |
+------------------------------------------+------------+
| Anemia in ESRD (end-stage renal disease) | 2018 |
+------------------------------------------+------------+
| PAF (paroxysmal atrial fibrillation)     | 2018 |
+------------------------------------------+------------+
 
 
 
+-------------------------------------------------------------------+
|   Overview:   PAF with dialysis in the past and recurrent post op |
|  MV/CABG surgery.   Last Assessment & Plan:   Remains in          |
| NSRNormal atrial size by echocardiogram. Plan:Continue Coreg with |
|  up-titration as toleratedAmiodarone 400 mg BID while inpatient   |
| and then transition to 200 mg BID for 2 weeks and then 200 mg     |
| daily. Continue po 1-3 months post discharge. Continue warfarin   |
| for CVA prophylaxis, INR 2.3 today                                |
|Continue warfarin for CVA prophylaxis, INR 2.3 today               |
+-------------------------------------------------------------------+
 
 
 
+-------------------------+------------+
| S/P mitral valve repair | 2018 |
+-------------------------+------------+
 
 
 
+-------------------------------------------------------------------+
|   Overview:   Cristopher ring 2.16.18 (Kybernesis) for severe MR. POLY    |
| left. CABG too, SVG x 2 to OM and RCA.  Last Assessment & Plan:   |
|  Cristopher ring 2.16.18 (Kybernesis) for severe MR. POLY left. CABG too, |
|  SVG x 2 to OM and RCA.                                           |
+-------------------------------------------------------------------+
 
 
 
+--------------+------------+
| S/P CABG x 2 | 2018 |
+--------------+------------+
 
 
 
 
+------------------------------------------------------------------+
|   Overview:   SVG's to OM and RCA at time of MV ring Cristopher    |
| #28. POLY left.  Last Assessment & Plan:   18: Mitral valve  |
| repair with a 28 Cristopher annuloplasty ring; CABG x2 (SVG to OM  |
| and SVG to RCA)Plan:ASA, statin, BB and ARB                      |
|ASA, statin, BB and ARB                                           |
+------------------------------------------------------------------+
 
 
 
+----------------------------------+------------+
| Cardiomyopathy, unspecified type | 2018 |
+----------------------------------+------------+
 
 
 
+-------------------------------------------------------------------+
|   Overview:   Ischemic (3 V CAD) and non ischemic (Hypertension   |
| and MR and DM and ESRD). Tolerated metoprolol, losartan but avoid |
|  spironolactone due to ESRD. Echocardiogram done on 10/24/18      |
| shows mild biatrial dilatation, mild left ventricular dilatation  |
| with a mild eccentric left ventricular hypertrophy, there is a    |
| severe global hypokinesis of the left ventricle, overall, left    |
| ventricular systolic function is severely decreased, LVEF is      |
| 25-30%, mildly thickened and calcified trileaflet aortic valve    |
| with adequate opening, there is aortic valve sclerosis without    |
| significant aortic valve stenosis, there is a mild aortic valve   |
| insufficiency, mildly thickened and calcified mitral valve        |
| suggesting myxoma change with evidence of mitral valve repair,    |
| there is a mild to mitral valve stenosis and a mild mitral valve  |
| regurgitation, mild mitral annular calcification, mild tricuspid  |
| valve regurgitation, mild pulmonary hypertension with a peak      |
| systolic pressure 40-45 mmHg, normal IVC with normal respiratory  |
| collapse, when compared to echocardiography on 18, left      |
| ventricular systolic function continued to be worsened. Johnie    |
| MD Dora  Last Assessment & Plan:   Severe ischemic          |
| cardiomyopathy (EF 15%) and ESRD on HD who presented with dyspnea |
|  related to bilateral pleural effusions and episodic AF with RVR  |
| now s/p thoracentesis with significant improvement in her         |
| symptoms and in NSR. Plan:Volume removal with dialysis as         |
| toleratedLasix 80 mg twice a day Coreg and low dose losartan. Up  |
| titrate as tolerated but working on fluid removal and maintaining |
|  adequate BP. Strict I/O with daily weights.                      |
+-------------------------------------------------------------------+
 
 
 
+--------------------------------------------------------------+---+
| NSTEMI (non-ST elevated myocardial infarction)               |   |
+--------------------------------------------------------------+---+
| Coronary artery disease involving native coronary artery of  |   |
| native heart without angina pectoris                         |   |
+--------------------------------------------------------------+---+
 
 
 
+-------------------------------------------------------------------+
|   Overview:   Stress test on 2018 shows pharmacologic stress |
 
|  notable for baseline abnormal ECG without diagnostic changes     |
| post stress, mild LV enlargement with LVEF 33% reviewed,          |
| perfusion imaging notable for moderate sized high intensity area  |
| of reduced uptake involving the mid to apical inferior myocardium |
|  as well as the LV apex with minimal reversibility, consistent    |
| with patient's known occluded RCA, no significant additional      |
| ischemia is noted, overall this is consistent with the patient's  |
| known history of multivessel CAD and ischemic cardiomyopathy, by  |
| Anthony Gunn MD.Echocardiogram on 2018 shows mild left     |
| ventricular chamber dilatation with upper normal wall thickness,  |
| moderate global left ventricular systolic dysfunction with a      |
| visually estimated left ventricular ejection fraction of 30-35%,  |
| mild left atrial chamber enlargement, surgically repaired mitral  |
| valve with normal function, mild aortic valve regurgitation, mild |
|  pulmonary arterial hypertension, when compared with prior        |
| echocardiogram dated 18, no significant interval change, by |
|  Dejan Breaux MD. C on 2012 shows totally occluded right |
|  coronary artery with collateral filling from the left, no        |
| significant stenoses within the left anterior descending artery   |
| and circumflex artery. Patent stents within the left anterior     |
| descending artery, normal intracardiac pressure, mild narrowing   |
| within the distal aorta and iliac arteries. - Sherie Bartholomew,   |
| MDEchocardiogram on 2017 shows Study: a 2-dimensional        |
| transthoracic echocardiogram with m-mode, spectral and color flow |
|  Doppler was performed, left ventricular systolic function is     |
| low-normal with, an EF between 50 - 55 %, the left ventricle      |
| cavity size is normal, the RV is normal in size and function, the |
|  left atrium is normal in size, the right atrium is normal in     |
| size, aortic valve is trileaflet and is mildly thickened, there   |
| is mild aortic regurgitation, there is no evidence of aortic      |
| stenosis, the mitral valve is normal. - Kyler Pettit [facility] |
|  on 2017 shows severe triple-vessel coronary artery disease   |
| with moderate ostial left anterior descending artery and patent   |
| stent in the midsegment, moderate stenosis in the second obtuse   |
| marginal branch and total occlusion of the proximal right         |
| coronary artery, which is known from before, given the patient's  |
| symptoms at this time, recommendation given. The patient          |
| understands. We will proceed with further medical management with |
|  blood pressure control. Also, we will optimize our blood         |
| pressure management. Further evaluation for the ostial left       |
| anterior descending artery and circumflex lesion upon recurrent   |
| symptoms for the patient, the patient will be evaluated for any   |
| further symptoms and fractional flow reserve of the left anterior |
|  descending artery and possible intervention on the left          |
| circumflex system will be considered. - [facility]   |
| on 2017 shows severe 3-vessel coronary artery disease with   |
| moderate to severe proximal left anterior descending with         |
| significant fractional flow reserve value of 0.77, severe mid     |
| large marginal branch, and chronically occluded right coronary    |
| artery with left-to-right collaterals, normal left ventricular    |
| end-diastolic pressure. - [facility]     |
| shows three-vessel coronary artery disease with a chronically     |
| occluded right coronary artery with left-to-right collaterals,    |
| severe proximal left anterior descending artery with a patent     |
| stent in the mid left anterior descending artery and a            |
| significant FFR value of 0.77 during diagnostic angiogram on      |
| 2017, and severe disease of the second marginal branch |
|  of the left circumflex artery, successful PTCA and stenting of   |
| the second marginal branch of the left circumflex artery using a  |
| 2.25 x 16 and a 2.25 x 8 mm overlapping synergy drug-eluting      |
 
| stents postdilated using a 2.25 noncompliant balloon, successful  |
| direct stenting of the proximal and ostium of the left anterior   |
| descending artery using a 3.5 x 16 mm Synergy drug-eluting stent  |
| postdilated using a 3.5 noncompliant balloon. - Marcos Gamboa, |
|  MDEchocardiogram on 2018 shows the left ventricle is normal |
|  in size, wall thickness and moderately impaired systolic         |
| function EF 35-40%. Inferior, inferolateral and anterolateral     |
| hypokinesis, the right ventricle is normal in size and function,  |
| severe mitral regurgitation with moderately dilated left atrium,  |
| mild tricuspid regurgitation and mild pulmonary hypertension RVSP |
|  48 mmHg, there is no pericardial effusion, new wall motion       |
| abnormalities and new severe mitral regurgitation compared to     |
| prior study. - ALYCIA Diane on 2018 shows         |
| three-vessel coronary artery disease with widely patent stent in  |
| the left anterior descending artery and severe stent restenosis   |
| in the marginal branch, occluded right coronary artery with       |
| collateral from left to right, severe left ventricular            |
| dysfunction with severe mitral regurgitation, status post         |
| percutaneous transluminal coronary angioplasty of in-stent        |
| restenosis within the marginal branch, recommend consideration    |
| for mitral valve replacement and 2-vessel coronary artery bypass  |
| surgery to the right coronary artery and marginal branch. - Iyad  |
| TONIA ChanE and CABG x2 on 2018 shows Severely reduced LV  |
| systolic function. Visually estimated LVEF 25%, normal LV size    |
| and shape. Normal wall thickness, normal RV size with normal      |
| function, LA enlarged. POLY normal size without SEC, masses, or    |
| thrombi. IAS intact, no PFO detected, mitral valve with bileaflet |
|  thickening and severely restricted motion, associated with       |
| ventricular tethering. Severe functional MR with central jet,     |
| trileaflet aortic valve with mild sclerosis. No significant       |
| aortic stenosis. Mild AI with central jet, tricuspid valve normal |
|  structure and function. Trace TR, pulmonic valve normal Trace    |
| NC, visible portions of ascending aorta and arch normal. Grade II |
|  atherosclerotic disease of descending aorta, pulmonary artery    |
| normal, normal pericardium. No pericardial or pleural effusions,  |
| left ventricular function slightly improved and right ventricular |
|  function unchanged compared to preop,  28 mm mitral valve        |
| annuloplasty ring is well-seated with an acceptable inflow        |
| gradient across the mitral valve and no MR noted, no change in    |
| the aortic, tricuspid, or pulmonic valves compared to preop, no   |
| change in visible portions of aorta after decannulation, LV and   |
| RV function unchanged after sternal closure. - Jagjit Hickey,       |
| MDEchocardiogram on 3/7/2018 shows a complete two-dimensional     |
| transthoracic echocardiogram was performed (2D, M-mode, Doppler   |
| and color flow Doppler), left ventricular systolic function is    |
| severely reduced, the visually estimated LV ejection fraction is  |
| 15%, the left and right atrial chambers are both normal in size,  |
| there is mild mitral regurgitation, an annuloplasty ring is noted |
|  in the mitral position, there is mild tricuspid regurgitation,   |
| Estimated PA pressure is 42 mmHg, the aortic valve leaflets       |
| appear mildly calcified, the aortic valve opens well, the aortic  |
| valve mean systolic gradient was measured at 9 mm Hg. - Star    |
| MD Alvarez  Last Assessment & Plan:   [facility]GI in [facility] 5 months |
|  ago. 1 week off Plavix for CAGG/MVr surgery 16. Now and in the |
|  past with PAFAlso statin - data in dialysis patients for primary |
|  prevention with statin is not beneficial but this is secondary   |
| prevention. However, instead of high dose statin, moderate dose   |
| due to ESRD with slight increased risk of rhabdo. - Warfarin -    |
| ASA - Moderate dose Statin                                        |
+-------------------------------------------------------------------+
 
 
 
 
+----------------------+---+
| Mixed hyperlipidemia |   |
+----------------------+---+
 
 
 
+-------------------------------------------------------------+
|   Last Assessment & Plan:   Moderate dose statin and don't  |
| titrate due to ESRD and ASHD. - Continue Atorvastatin 20mg  |
+-------------------------------------------------------------+
 
 
 
+------------------------------------------------------------------+---+
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
| dialysis, with long-term current use of insulin                  |   |
+------------------------------------------------------------------+---+
| ESRD (end stage renal disease)                                   |   |
+------------------------------------------------------------------+---+
 
 
 
+--------------------------------------------------------+
|   Last Assessment & Plan:   Dialysis Tues, Thurs, Sat. |
| 2 L removal on HD yesterday and 1.1 L today            |
| Nephrology following.                                  |
| Chronic ESRD anemia.                                   |
|                                                        |
| Plan:                                                  |
| Continue Lasix and HD                                  |
+--------------------------------------------------------+
 
 
 
+-------------------------+---+
| Hypertension, essential |   |
+-------------------------+---+
 
 
 
+------------------------------------------------------------------+
|   Last Assessment & Plan:   Will resume metoprolol and losartan  |
| as BP allows.                                                    |
+------------------------------------------------------------------+
 
 
 
+----------------------------------------------+---+
| COPD (chronic obstructive pulmonary disease) |   |
+----------------------------------------------+---+
| Severe mitral regurgitation                  |   |
+----------------------------------------------+---+
 
 
 
 Resolved Problems
 
 
 
+-------------------------------+----------+-----------+
| Problem                       | Noted    | Resolved  |
|                               | Date     | Date      |
+-------------------------------+----------+-----------+
| Junctional cardiac arrhythmia | 20 |  |
|                               | 18       | 8         |
+-------------------------------+----------+-----------+
 
 
 
+----------------------------------------------------------------+
|   Last Assessment & Plan:   Persistent. Will stop Amiodarone.  |
+----------------------------------------------------------------+
 
 
 
 Encounters
 
 
+--------+-----------+-----------+----------------------+----------------------+
| Date   | Type      | Specialty | Care Team            | Description          |
+--------+-----------+-----------+----------------------+----------------------+
| / | Telephone |           |   Johnie John, | Other (angina)       |
| 2019   |           |           |  MD                  |                      |
+--------+-----------+-----------+----------------------+----------------------+
| / | Implant   |           |   Johnie John, | Remote Device        |
|    | Monitor   |           |  MD                  | Interrogation        |
|        |           |           |                      | (Primary Dx); ICD    |
|        |           |           |                      | (implantable         |
|        |           |           |                      | cardioverter-defibri |
|        |           |           |                      | llator) [employer]       |
|        |           |           |                      | Scientific  18   |
|        |           |           |                      | Dora;           |
|        |           |           |                      | Cardiomyopathy,      |
|        |           |           |                      | unspecified type     |
|        |           |           |                      | (HCC)                |
+--------+-----------+-----------+----------------------+----------------------+
| / | Telephone |           |   Johnie John, | Lab Order (Pt due    |
2019   |           |           |  MD                  | for labs prior to    |
|        |           |           |                      | appt. )              |
+--------+-----------+-----------+----------------------+----------------------+
| / | Telephone |           |   Johnie John, | Other (medication    |
|    |           |           |  MD                  | changed due to ER    |
|        |           |           |                      | visit)               |
+--------+-----------+-----------+----------------------+----------------------+
| / | Telephone |           |   Johnie John, | Other ([facility])     |
2019   |           |           |  MD                  |                      |
+--------+-----------+-----------+----------------------+----------------------+
| / | Telephone |           |   [place],        | Other (heart issues) |
|    |           |           | JANETTE Plunkett         |                      |
+--------+-----------+-----------+----------------------+----------------------+
 from Last 3 Months
 
 Family History
 
 
+------------------+-----------+------+----------+
| Medical History  | Relation  | Name | Comments |
+------------------+-----------+------+----------+
 
| High cholesterol | Father    |      |          |
+------------------+-----------+------+----------+
| Hypertension     | Father    |      |          |
+------------------+-----------+------+----------+
| PVD              | Father    |      |          |
+------------------+-----------+------+----------+
| Dementia         | Mother    |      |          |
+------------------+-----------+------+----------+
| Diabetes         | Paternal  |      |          |
|                  | Grandmoth |      |          |
|                  | er        |      |          |
+------------------+-----------+------+----------+
| Kidney disease   | Neg Hx    |      |          |
+------------------+-----------+------+----------+
 
 
 
+----------------------+------+----------+----------+
| Relation             | Name | Status   | Comments |
+----------------------+------+----------+----------+
| Father               |      |  | PVD      |
+----------------------+------+----------+----------+
| Mother               |      |  | Dementia |
+----------------------+------+----------+----------+
| Paternal Grandmother |      |  |          |
+----------------------+------+----------+----------+
 
 
 
 Social History
 
 
+---------------+------------+-----------+--------+------------------+
| Tobacco Use   | Types      | Packs/Day | Years  | Date             |
|               |            |           | Used   |                  |
+---------------+------------+-----------+--------+------------------+
| Former Smoker | Cigarettes | 1         | 30     | Quit: 2004 |
+---------------+------------+-----------+--------+------------------+
 
 
 
+---------------------+---+---+---+
| Smokeless Tobacco:  |   |   |   |
| Never Used          |   |   |   |
+---------------------+---+---+---+
 
 
 
+-------------+-----------+---------+----------+
| Alcohol Use | Drinks/We | oz/Week | Comments |
|             | ek        |         |          |
+-------------+-----------+---------+----------+
| No          |           |         |          |
+-------------+-----------+---------+----------+
 
 
 
+------------------+---------------+
| Sex Assigned at  | Date Recorded |
| Birth            |               |
 
+------------------+---------------+
| Not on file      |               |
+------------------+---------------+
 
 
 
+----------------+-------------+-------------+
| Job Start Date | Occupation  | Industry    |
+----------------+-------------+-------------+
| Not on file    | Not on file | Not on file |
+----------------+-------------+-------------+
 
 
 
+----------------+--------------+------------+
| Travel History | Travel Start | Travel End |
+----------------+--------------+------------+
 
 
 
+-------------------------------------+
| No recent travel history available. |
+-------------------------------------+
 
 
 
 Last Filed Vital Signs
 
 
+-------------------+----------------------+---------------------+
| Vital Sign        | Reading              | Time Taken          |
+-------------------+----------------------+---------------------+
| Blood Pressure    | 125/55               | 2018 PDT |
+-------------------+----------------------+---------------------+
| Pulse             | 62                   | 2018 PDT |
+-------------------+----------------------+---------------------+
| Temperature       | 37   C (98.6   F)    | 2018 PDT |
+-------------------+----------------------+---------------------+
| Respiratory Rate  | 16                   | 2018 PDT |
+-------------------+----------------------+---------------------+
| Oxygen Saturation | 97%                  | 2018 PDT |
+-------------------+----------------------+---------------------+
| Inhaled Oxygen    | -                    | -                   |
| Concentration     |                      |                     |
+-------------------+----------------------+---------------------+
| Weight            | 65.3 kg (143 lb 15.4 | 2018 PDT |
|                   |  oz)                 |                     |
+-------------------+----------------------+---------------------+
| Height            | 177.8 cm (5' 10")    | 09/15/2018 0045 PDT |
+-------------------+----------------------+---------------------+
| Body Mass Index   | 20.66                | 09/15/2018 0045 PDT |
+-------------------+----------------------+---------------------+
 
 
 
 Plan of Treatment
 
 
+--------+---------+-----------+----------------------+-------------+
| Date   | Type    | Specialty | Care Team            | Description |
 
+--------+---------+-----------+----------------------+-------------+
| / | Office  |           |   Johnie John, |             |
|    | Visit   |           |  MD  [address] |             |
|        |         |           |    [place],   |             |
|        |         |           | [place] [address]             |             |
|        |         |           | [phone]         |             |
|        |         |           | [phone] (Fax)   |             |
+--------+---------+-----------+----------------------+-------------+
 
 
 
+----------------------+-----------+------------+----------+
| Health Maintenance   | Due Date  | Last Done  | Comments |
+----------------------+-----------+------------+----------+
| Hepatitis C          |  |            |          |
| Screening            | 9         |            |          |
+----------------------+-----------+------------+----------+
| Diabetic Eye Exam    |  |            |          |
|                      | 7         |            |          |
+----------------------+-----------+------------+----------+
| Diabetic Foot Exam   |  |            |          |
|                      | 7         |            |          |
+----------------------+-----------+------------+----------+
| Vaccine:             |  |            |          |
| Dtap/Tdap/Td (1 -    | 8         |            |          |
| Tdap)                |           |            |          |
+----------------------+-----------+------------+----------+
| Breast Cancer        |  |            |          |
| Screening (Ages      | 9         |            |          |
| 50-74)               |           |            |          |
+----------------------+-----------+------------+----------+
| Colorectal Cancer    |  |            |          |
| Screening            | 9         |            |          |
| (Colonoscopy)        |           |            |          |
+----------------------+-----------+------------+----------+
| Vaccine: Zoster (1   |  |            |          |
| of 2)                | 9         |            |          |
+----------------------+-----------+------------+----------+
| Vaccine:             |  | 2012 |          |
| Pneumococcal 65+     | 4         |            |          |
| High/Highest Risk (1 |           |            |          |
|  of 2 - PCV13)       |           |            |          |
+----------------------+-----------+------------+----------+
| Adult Annual         |  |            |          |
| Wellness Visit       | 6         |            |          |
+----------------------+-----------+------------+----------+
| Hemoglobin A1c       | 05/15/201 | 02/15/2018 |          |
| Screening            | 8         |            |          |
+----------------------+-----------+------------+----------+
| Vaccine: Influenza   |  |            |          |
| (Season Ended)       | 9         |            |          |
+----------------------+-----------+------------+----------+
 
 
 
 Implants
 
 
+-------------------------------+--------+-------+-------------+--------+--------+--------+
| Implanted                     | Type   | Area  | Manufacture | Device | Shelf  | Model  |
 
|                               |        |       | r           |        | Expira | /      |
|                               |        |       |             | Identi | tion   | Serial |
|                               |        |       |             | fier   | Date   |  / Lot |
+-------------------------------+--------+-------+-------------+--------+--------+--------+
| Ring Anndarwint Cosgrv 28mm -   | Generi | N/A:  | CHRIS     |        | / | [id number] |
| [id number]: Qty: 1 on  | c      | Heart | LIFESCIENCE |        | 2022   | MM     |
| 2018 by Anthony Beth   |        |       | [employer] -     |        |        | /[id number] |
| MD JIMMY                         |        |       | EDLS        |        |        | 34 /   |
+-------------------------------+--------+-------+-------------+--------+--------+--------+
| Dynagen El Icd Vr Implanted:  | Implan | N/A:  | [employer]      |        | / | [id number]   |
| Qty: 1 on 2018 by       | table  | Chest | [employer]  |        |    | /[id number] |
| Johnie John MD         | Cardio |       | [facility] [employer] |        |        | 3      |
|                               | verter |       |             |        |        | / |
|                               |        |       |             |        |        | 9      |
|                               | Defibr |       |             |        |        |        |
|                               | illato |       |             |        |        |        |
|                               | r      |       |             |        |        |        |
+-------------------------------+--------+-------+-------------+--------+--------+--------+
| Endotak [place] Sg           | Lead   | N/A:  | [employer]ON      |        | 10/21/ | [id number]   |
| Implanted: Qty: 1 on          |        | Chest | [employer]  |        |    | /[id number] |
| 2018 by Dora,      |        |       | [facility] [employer] |        |        | 5      |
| MD Johnie                    |        |       |             |        |        | /[id number] |
|                               |        |       |             |        |        | 0      |
+-------------------------------+--------+-------+-------------+--------+--------+--------+
 
 
 
 Procedures
 
 
+-----------------+--------+-------------+----------------------+----------------------+
| Procedure Name  | Priori | Date/Time   | Associated Diagnosis | Comments             |
|                 | ty     |             |                      |                      |
+-----------------+--------+-------------+----------------------+----------------------+
| DEVICE          | Routin | 2019  |   Remote Device      |   Results for this   |
| INTERROGATION-  | e      | 23:59 PDT   | Interrogation   ICD  | procedure are in the |
| REMOTE          |        |             | (implantable         |  results section.    |
|                 |        |             | cardioverter-defibri |                      |
|                 |        |             | llator) [facility]       |                      |
|                 |        |             | Scientific  18   |                      |
|                 |        |             | Dora            |                      |
|                 |        |             | Cardiomyopathy,      |                      |
|                 |        |             | unspecified type     |                      |
|                 |        |             | (HCC)                |                      |
+-----------------+--------+-------------+----------------------+----------------------+
 from Last 3 Months
 
 Results
 Device Interrogation - Remote (2019 23:59 PDT)
 
+-----------------------------------------------------------------------+--------------+
| Narrative                                                             | Performed At |
+-----------------------------------------------------------------------+--------------+
|   This result has an attachment that is not available.  Johnie        |   [facility]    |
| MD Dora         2019 15:05Date of Remote Interrogation:    |              |
| 19 Refer to [employer] documentation and remote PDF scanned into    |              |
| EPIC for remote interrogation results.    Data collected by Clementina SALAZAR     |              |
| NAHUN Maynard Presenting rhythm:    sinus rhythm 59-60 beats.0           |              |
| ventricular high rate episodes. No tachycardia therapies recommended  |              |
| or delivered.Histogram    good.     Battery longevity                 |              |
 
| 12    years.Apparent normal and stable device function.Device         |              |
| interrogation due in office in 2019.                        |              |
|recommended or delivered.                                              |              |
|Histogram    good.     Battery longevity 12    years.                 |              |
|Apparent normal and stable device function.                            |              |
|Device interrogation due in office in 2019.                  |              |
|                                                                       |              |
+-----------------------------------------------------------------------+--------------+
 
 
 
+--------------+---------+--------------------+--------------+
| Performing   | Address | City/State/Zipcode | Phone Number |
| Organization |         |                    |              |
+--------------+---------+--------------------+--------------+
|   [facility]    |         |                    |              |
+--------------+---------+--------------------+--------------+
 from Last 3 Months
 
 Insurance
 
 
+-----------------+--------+-------------+--------+-------------+---------+--------+
| Payer           | Benefi | Subscriber  | Effect | Phone       | Address | Type   |
|                 | t Plan | ID          | rakesh    |             |         |        |
|                 |  /     |             | Dates  |             |         |        |
|                 | Group  |             |        |             |         |        |
+-----------------+--------+-------------+--------+-------------+---------+--------+
| MEDICARE        | MEDICA | [id number]  | 20 | [phone] |         | [employer] |
|                 | RE     |             | 03-Pre | 5           |         | re     |
|                 | PART A |             | sent   |             |         |        |
|                 |  AND B |             |        |             |         |        |
+-----------------+--------+-------------+--------+-------------+---------+--------+
| MEDICAID [place] | MEDICA | [id number]    |  | [phone] |         | [employer] |
|                 | [place]  |             | 018-Pr | 2           |         | id     |
|                 | PLUS   |             | esent  |             |         |        |
+-----------------+--------+-------------+--------+-------------+---------+--------+
 
 
 
+----------------------+--------+-------------+--------+-------------+-----------------+
| Guarantor Name       | Accoun | Relation to | Date   | Phone       | Billing Address |
|                      | t Type |  Patient    | of     |             |                 |
|                      |        |             | Birth  |             |                 |
+----------------------+--------+-------------+--------+-------------+-----------------+
| Cherie Villalobos | Person | Self        | / |             |   [address]    |
|                      | al/Fam |             | 1950   | [phone] | [place]    |
|                      | [place]    |             |        | 0 (Home)    | [phone]      |
+----------------------+--------+-------------+--------+-------------+-----------------+
 
 
 
 Advance Directives
 Patient has advance care planning documents, and code status on file. For more information,
 please contact:[facility] and [facility] [place] [address]
 
+-------------+-------------+-------------+----------+
| Code Status | Date        | Date        | Comments |
|             | Activated   | Inactivated |          |
+-------------+-------------+-------------+----------+
 
| Full Code   | 9/15/2018   | 2018   |          |
|             | 0:51        | 19:33       |          |
+-------------+-------------+-------------+----------+
 
 
 
+-----------+-----------+------------+---+
|           |           |            |   |
+-----------+-----------+------------+---+
| Full Code | 3/6/2018  | 3/16/2018  |   |
|           | 17:36     | 13:11      |   |
+-----------+-----------+------------+---+
 
 
 
+-----------+------------+------------+---+
|           |            |            |   |
+-----------+------------+------------+---+
| Full Code | 2018  | 2018  |   |
|           | 17:49      | 14:01      |   |
+-----------+------------+------------+---+

## 2019-04-09 NOTE — XMS
Clinical Summary
  Created on: 2019
 
 Cherie Villalobos
 External Reference #: [id number]
 : 48
 Sex: Female
 
 Demographics
 
 
+-----------------------+--------------------------+
| Address               | [address]               |
|                       | [place]  [address] |
+-----------------------+--------------------------+
| Home Phone            | [phone]          |
+-----------------------+--------------------------+
| Preferred Language    | Unknown                  |
+-----------------------+--------------------------+
| Marital Status        |                   |
+-----------------------+--------------------------+
| Sikh Affiliation | 1013                     |
+-----------------------+--------------------------+
| Race                  | Unknown                  |
+-----------------------+--------------------------+
| Ethnic Group          | Unknown                  |
+-----------------------+--------------------------+
 
 
 Author
 
 
+--------------+--------------------------------------------+
| Author       | [facility]  |
|              | [facility] [place]                                |
+--------------+--------------------------------------------+
| Organization | [facility]  |
|              | [facility] [place]                                |
+--------------+--------------------------------------------+
| Address      | Unknown                                    |
+--------------+--------------------------------------------+
| Phone        | Unavailable                                |
+--------------+--------------------------------------------+
 
 
 
 Support
 
 
+---------------+--------------+---------------------+-----------------+
| Name          | Relationship | Address             | Phone           |
+---------------+--------------+---------------------+-----------------+
| Anoop Villalobos | ECON         | [address] [place], | [phone] |
|               |              |  [place]  [address]     |                 |
+---------------+--------------+---------------------+-----------------+
| Jennifer Dickson | ECON         | [place], [place]       | [phone] |
|               |              | [id number]               |                 |
+---------------+--------------+---------------------+-----------------+
 
 
 
 
 Care Team Providers
 
 
+--------------------------+------+-----------------+
| Care Team Member Name    | Role | Phone           |
+--------------------------+------+-----------------+
| Araseli Montelongo [employer]  | PP   | [phone] |
| Professional             |      |                 |
+--------------------------+------+-----------------+
 
 
 
 Allergies
 
 
+---------------------+----------------------+----------+----------+----------------------+
| Active Allergy      | Reactions            | Severity | Noted    | Comments             |
|                     |                      |          | Date     |                      |
+---------------------+----------------------+----------+----------+----------------------+
| Digoxin And Related | Other (See Comments) | High     | 20 |   Toxic, patient     |
|                     |                      |          | 18       | states her eyes were |
|                     |                      |          |          |  also affected "she  |
|                     |                      |          |          | seen yellow          |
|                     |                      |          |          | everywhere"          |
+---------------------+----------------------+----------+----------+----------------------+
| Morphine            | Other (See Comments) | High     | 20 |   Hallucinations     |
|                     |                      |          | 18       |                      |
+---------------------+----------------------+----------+----------+----------------------+
| Penicillins         | Hives                | Medium   | 20 |                      |
|                     |                      |          | 18       |                      |
+---------------------+----------------------+----------+----------+----------------------+
| Pantoprazole Sodium | Nausea And Vomiting  | Medium   | 20 |                      |
|                     |                      |          | 18       |                      |
+---------------------+----------------------+----------+----------+----------------------+
| Pseudoephedrine     | Anxiety              | High     | 20 |                      |
|                     |                      |          | 18       |                      |
+---------------------+----------------------+----------+----------+----------------------+
| Quinapril Hcl       | Anaphylaxis          | High     | 20 |                      |
|                     |                      |          | 18       |                      |
+---------------------+----------------------+----------+----------+----------------------+
 
 
 
 Medications
 
 
+----------------------+----------------------+-----------+---------+------+------+-------+
| Medication           | Sig                  | Dispensed | Refills | Star | End  | Statu |
|                      |                      |           |         | t    | Date | s     |
|                      |                      |           |         | Date |      |       |
+----------------------+----------------------+-----------+---------+------+------+-------+
|   cholecalciferol    | Take 1 capsule by    |           | 0       |      |      | Activ |
| (VITAMIN D-3) 5000   | mouth Every other    |           |         |      |      | e     |
| units TABS           | day.                 |           |         |      |      |       |
+----------------------+----------------------+-----------+---------+------+------+-------+
|   nortriptyline      | Take 25 mg by mouth  |           | 0       |      |      | Activ |
| (PAMELOR) 25 mg      | 2 times daily. 1     |           |         |      |      | e     |
 
| capsule              | tablet in AM and 3   |           |         |      |      |       |
|                      | tablets in Evening   |           |         |      |      |       |
+----------------------+----------------------+-----------+---------+------+------+-------+
|   BD PEN NEEDLE LISA | Inject 4 Sticks      |           | 0       | 01/0 |      | Activ |
|  U/F 32G X 4 MM MISC | under the skin 4     |           |         | 9/20 |      | e     |
|                      | times daily as       |           |         | 18   |      |       |
|                      | needed.              |           |         |      |      |       |
+----------------------+----------------------+-----------+---------+------+------+-------+
|   oxybutynin         | Take 2.5 mg by mouth |           | 0       |      |      | Activ |
| (DITROPAN) 5 mg      |  2 times daily.      |           |         |      |      | e     |
| tablet               |                      |           |         |      |      |       |
+----------------------+----------------------+-----------+---------+------+------+-------+
|   prochlorperazine   | Take 5 mg by mouth   |           | 0       |      |      | Activ |
| (COMPAZINE) 5 mg     | Twice  daily as      |           |         |      |      | e     |
| tablet               | needed.              |           |         |      |      |       |
+----------------------+----------------------+-----------+---------+------+------+-------+
|   rOPINIRole         | Take 0.75 mg by      |           | 0       |      |      | Activ |
| (REQUIP) 0.25 mg     | mouth nightly. Takes |           |         |      |      | e     |
| tablet               |  3 tablets at night  |           |         |      |      |       |
+----------------------+----------------------+-----------+---------+------+------+-------+
|   insulin aspart     | Inject 1-6 Units     |   10 mL   | 0       | 02/2 |      | Activ |
| (NOVOLOG FLEXPEN)    | under the skin 3     |           |         | 2/20 |      | e     |
| 100 units/mL         | times daily (with    |           |         | 18   |      |       |
| injection            | meals). CORRECTION   |           |         |      |      |       |
| penIndications: Type | SCALE: Novolog 0-6   |           |         |      |      |       |
|  2 diabetes mellitus | units before meals,  |           |         |      |      |       |
|  with other          | bedtime SC.  Add to  |           |         |      |      |       |
| specified            | nutritional dose if  |           |         |      |      |       |
| complication, with   | applicable.  [BG <   |           |         |      |      |       |
| long-term current    | 150: None]  [BG      |           |         |      |      |       |
| use of insulin (HCC) | 150-200: 1 units]    |           |         |      |      |       |
|                      | [-250: 2       |           |         |      |      |       |
|                      | units]  [-300: |           |         |      |      |       |
|                      |  3 units]  [BG       |           |         |      |      |       |
|                      | 301-350: 4 units]    |           |         |      |      |       |
|                      | [-400: 5       |           |         |      |      |       |
|                      | units]  [BG > 400: 6 |           |         |      |      |       |
|                      |  units and call PCP] |           |         |      |      |       |
+----------------------+----------------------+-----------+---------+------+------+-------+
|   aspirin 81 mg      | Take 1 tablet by     |   90      | 0       | 02/2 |      | Activ |
| chewable tablet      | mouth Daily.         | tablet    |         | 4/20 |      | e     |
|                      |                      |           |         | 18   |      |       |
+----------------------+----------------------+-----------+---------+------+------+-------+
|   insulin degludec   | Inject 4 Units under |   1 pen   | 3       | 03/1 |      | Activ |
| (TRESIBA FLEXTOUCH)  |  the skin every      |           |         | 2/20 |      | e     |
| 100 units/mL         | evening.             |           |         | 18   |      |       |
| injectionIndications |                      |           |         |      |      |       |
| : Type 2 diabetes    |                      |           |         |      |      |       |
| mellitus with        |                      |           |         |      |      |       |
| chronic kidney       |                      |           |         |      |      |       |
| disease on chronic   |                      |           |         |      |      |       |
| dialysis, with       |                      |           |         |      |      |       |
| long-term current    |                      |           |         |      |      |       |
| use of insulin (HCC) |                      |           |         |      |      |       |
+----------------------+----------------------+-----------+---------+------+------+-------+
|   acetaminophen      | Take 2 tablets by    |   120     | 0       | 03/1 |      | Activ |
| (TYLENOL) 325 mg     | mouth every 4 hours  | tablet    |         | 6/20 |      | e     |
| tablet               | as needed for Pain   |           |         | 18   |      |       |
|                      | (or fever >= 38.6 C  |           |         |      |      |       |
|                      | (101.5 F)).          |           |         |      |      |       |
 
+----------------------+----------------------+-----------+---------+------+------+-------+
|   LORazepam (ATIVAN) | Take 1 tablet by     |   60      | 0       | 03/1 |      | Activ |
|  1 mg tablet         | mouth every 8 hours  | tablet    |         | 6/20 |      | e     |
|                      | as needed for        |           |         | 18   |      |       |
|                      | Anxiety.             |           |         |      |      |       |
+----------------------+----------------------+-----------+---------+------+------+-------+
|   albuterol 5 mg/mL  | Take 0.5 mLs by      |   20 vial | 0       | 03/1 |      | Activ |
| nebulizer solution   | nebulization every 4 |           |         | 6/20 |      | e     |
|                      |  hours as needed for |           |         | 18   |      |       |
|                      |  Shortness of Breath |           |         |      |      |       |
|                      |  or Increased Work   |           |         |      |      |       |
|                      | of Breathing.        |           |         |      |      |       |
+----------------------+----------------------+-----------+---------+------+------+-------+
|   losartan (COZAAR)  | Take 100 mg by mouth |           | 0       |      |      | Activ |
| 100 MG tablet        |  Daily.              |           |         |      |      | e     |
+----------------------+----------------------+-----------+---------+------+------+-------+
|   esomeprazole       | 2 times daily.       |           | 0       | 05/0 |      | Activ |
| (NEXIUM) 40 mg       |                      |           |         | 2/20 |      | e     |
| capsule              |                      |           |         | 18   |      |       |
+----------------------+----------------------+-----------+---------+------+------+-------+
|   TRINTELLIX 10 MG   | Daily.               |           | 0       | 04/2 |      | Activ |
| tablet               |                      |           |         | 4/20 |      | e     |
|                      |                      |           |         | 18   |      |       |
+----------------------+----------------------+-----------+---------+------+------+-------+
|   nitroglycerin      | Place 1 tablet under |   100     | 3       | 05/0 | 05/0 | Activ |
| (NITROSTAT) 0.4 mg   |  the tongue every 5  | tablet    |         | 7/20 | 7/20 | e     |
| SL tablet            | minutes as needed.   |           |         | 18   | 19   |       |
+----------------------+----------------------+-----------+---------+------+------+-------+
|   atorvaSTATin       | Take 1 tablet by     |           | 0       | 05/1 |      | Activ |
| (LIPITOR) 80 MG      | mouth nightly.       |           |         | 8/20 |      | e     |
| tablet               |                      |           |         | 18   |      |       |
+----------------------+----------------------+-----------+---------+------+------+-------+
|   docusate-senna     | Take 1 tablet by     |           | 0       |      |      | Activ |
| (SENOKOT-S) 50-8.6   | mouth as needed for  |           |         |      |      | e     |
| mg per tablet        | Constipation.        |           |         |      |      |       |
+----------------------+----------------------+-----------+---------+------+------+-------+
|   furosemide (LASIX) | TAKE 1 TABLET BY     |   30      | 3       | 09/0 |      | Activ |
|  20 mg tablet        | MOUTH ONCE DAILY     | tablet    |         |  |      | e     |
|                      |                      |           |         | 18   |      |       |
+----------------------+----------------------+-----------+---------+------+------+-------+
|                      | Take 1 tablet by     |           | 0       | 08/2 |      | Activ |
| HYDROcodone-acetamin | mouth as needed.     |           |         | 1/20 |      | e     |
| ophen (NORCO) 5-325  |                      |           |         | 18   |      |       |
| mg per tablet        |                      |           |         |      |      |       |
+----------------------+----------------------+-----------+---------+------+------+-------+
|   ondansetron        | Take 1 tablet by     |           | 0       | 08/2 |      | Activ |
| (ZOFRAN ODT) 4 mg    | mouth as needed.     |           |         | 1/20 |      | e     |
| disintegrating       |                      |           |         | 18   |      |       |
| tablet               |                      |           |         |      |      |       |
+----------------------+----------------------+-----------+---------+------+------+-------+
|   warfarin           | Take 2 mg by mouth   |           | 0       |      |      | Activ |
| (COUMADIN) 2 mg      | Daily. 1  tablet  |           |         |      |      | e     |
| tablet               | daily                |           |         |      |      |       |
+----------------------+----------------------+-----------+---------+------+------+-------+
|   traMADol (ULTRAM)  | Take 50 mg by mouth  |           | 0       |      |      | Activ |
| 50 mg tablet         | as needed.           |           |         |      |      | e     |
+----------------------+----------------------+-----------+---------+------+------+-------+
|   carvedilol (COREG) | Take 1.5 tablets by  |   90      | 5       |  |      | Activ |
|  12.5 mg tablet      | mouth 2 times daily  | tablet    |         |  |      | e     |
|                      | (with breakfast &    |           |         | 18   |      |       |
 
|                      | dinner).             |           |         |      |      |       |
+----------------------+----------------------+-----------+---------+------+------+-------+
|   ondansetron        | Take 1 tablet by     |   24      | 0       |  |      | Activ |
| (ZOFRAN ODT) 4 mg    | mouth every 8 hours  | tablet    |         |  |      | e     |
| disintegrating       | as needed for        |           |         | 18   |      |       |
| tablet               | Nausea.              |           |         |      |      |       |
+----------------------+----------------------+-----------+---------+------+------+-------+
 
 
 
 Active Problems
 
 
+------------+------------+
| Problem    | Noted Date |
+------------+------------+
| Chest pain | 2018 |
+------------+------------+
 
 
 
+-------------------------------------------------------------------+
|   Overview:   Chest XR 9/15/2018 - Findings are suggestive of     |
| mild pulmonary edema with small pleural effusions.  Borderline    |
| cardiomegaly. Edward Dailey MD Stress test done on 18     |
| shows pharmacologic stress notable for baseline abnormal ECG      |
| without diagnostic changes post stress, mild LV enlargement with  |
| LVEF 33% reviewed, perfusion imaging notable for moderate sized   |
| high intensity area of reduced uptake involving the mid to apical |
|  inferior myocardium as well as the LV apex with minimal          |
| reversibility, consistent with patient's known occluded RCA,  no  |
| significant additional ischemia is noted, overall this is         |
| consistent with the patient's known history of multivessel CAD    |
| and ischemic cardiomyopathy.X-Ray chest 2/10/2019 shows small     |
| bilateral pleural effusions with some mild probable bibasilar     |
| atelectasis, no fulminant pulmonary edema or focal infiltrate is  |
| seen, by Tip Pinto DO.                                  |
+-------------------------------------------------------------------+
 
 
 
+-------------------------------+------------+
| ACS (acute coronary syndrome) | 09/15/2018 |
+-------------------------------+------------+
 
 
 
+-------------------------------------------------------------------+
|   Overview:   Echocardiogram on 2018 shows, mild left        |
| ventricular chamber dilatation with upper normal wall thickness,  |
| moderate global left ventricular systolic dysfunction with a      |
| visually estimated left ventricular ejection fraction of 30-35%,  |
| mild left atrial chamber enlargement, surgically repaired mitral  |
| valve with normal function, mild aortic valve regurgitation, mild |
|  pulmonary arterial hypertension, when compared with prior        |
| echocardiogram dated 18, no significant interval change, by |
|  Dejan Breaux MD.                                               |
+-------------------------------------------------------------------+
 
 
 
 
+------------------------------------------------------------------+------------+
| Implantable defibrillator reprogramming/check                    | 2018 |
+------------------------------------------------------------------+------------+
| ICD (implantable cardioverter-defibrillator) [employer]   | 2018 |
| 18 Dora                                                 |            |
+------------------------------------------------------------------+------------+
 
 
 
+-------------------------------------------------------------------+
|   Overview:   Formatting of this note might be different from the |
|  original. MODEL NAME MODEL# SERIAL# DATE IMPLANTED GENERATOR     |
| [employer] Dynagen EL ICD [id number] [id number] 18 RV LEAD   |
| [employer] [place] [employer] [place] 0292 [id number] 18           |
| Indication: Nonischemic dilated cardiopathy with persistent       |
| severely depressed left ventricular systolic function, LVEF of    |
| LVEF of 30%                                                       |
+-------------------------------------------------------------------+
 
 
 
+------------------+------------+
| Pleural effusion | 2018 |
+------------------+------------+
 
 
 
+-------------------------------------------------------------------+
|   Last Assessment & Plan:   Post MV repair and CABG x2 2018  |
| and recurrent pleural effusions and elevated ESR. Plan:Received 1 |
|  time dose of Colchicine Started on Prednisone per surgery CXR    |
| today shows stable bilateral pleural effusions                    |
|Started on Prednisone per surgery                                  |
|CXR today shows stable bilateral pleural effusions                 |
+-------------------------------------------------------------------+
 
 
 
+---------------------------------------+------------+
| Steroid-induced hyperglycemia         | 2018 |
+---------------------------------------+------------+
| Moderate protein-calorie malnutrition | 2018 |
+---------------------------------------+------------+
| Chronic systolic heart failure        | 2018 |
+---------------------------------------+------------+
 
 
 
+-------------------------------------------------------------------+
|   Overview:   Echocardiogram 2018 shows mild biatrial        |
| dilatation, mild left ventricular dilatation with a mild          |
| eccentric left ventricular hypertrophy, there is a moderate       |
| global hypokinesis of left ventricle, lvef is 30-35%, normal      |
| right ventricular size and wall thickness with a mildly reduced   |
| right ventricular systolic function, mildly thickened and         |
| calcified trileaflet aortic valve with adequate opening, there is |
|  a mild aortic valve insufficiency, mildly thickened and          |
| calcified mitral valve suggesting myxomatous change with evidence |
|  of mitral valve repair, there is at least moderate central       |
 
| mitral valve regurgitation, mild tricuspid valve regurgitation,   |
| mild to moderate pulmonary hypertension with a peak systolic      |
| pressure of 50-55 mmhg, normal ivc with normal respiratory        |
| collapse, when compared to echocardiography on 3/7/18, left       |
| ventricular systolicsystolic function is slightly                 |
| improved.Echocardiogram 2018 show overall left ventricular    |
| systolic function is severely impaired with, an EF between 20 -   |
| 25 %, there is mild aortic regurgitation, there is mild aortic    |
| stenosis present, mild-to-moderate tricuspid regurgitation        |
| present, there is mild pulmonary hypertension, pleural effusion   |
| present. Duglas Ornelas MD, FACC; [facility]   Last Assessment & Plan:  |
|   EF 25% s/p CABG on 18 but now down to 15%. Plan:Continue   |
| Coreg and Losartan Hemodialysis for fluid removal.Strict I/O and  |
| daily weights.                                                    |
+-------------------------------------------------------------------+
 
 
 
+------------------------------------------+------------+
| Stress hyperglycemia                     | 2018 |
+------------------------------------------+------------+
| Anemia in ESRD (end-stage renal disease) | 2018 |
+------------------------------------------+------------+
| PAF (paroxysmal atrial fibrillation)     | 2018 |
+------------------------------------------+------------+
 
 
 
+-------------------------------------------------------------------+
|   Overview:   PAF with dialysis in the past and recurrent post op |
|  MV/CABG surgery.   Last Assessment & Plan:   Remains in          |
| NSRNormal atrial size by echocardiogram. Plan:Continue Coreg with |
|  up-titration as toleratedAmiodarone 400 mg BID while inpatient   |
| and then transition to 200 mg BID for 2 weeks and then 200 mg     |
| daily. Continue po 1-3 months post discharge. Continue warfarin   |
| for CVA prophylaxis, INR 2.3 today                                |
|Continue warfarin for CVA prophylaxis, INR 2.3 today               |
+-------------------------------------------------------------------+
 
 
 
+-------------------------+------------+
| S/P mitral valve repair | 2018 |
+-------------------------+------------+
 
 
 
+-------------------------------------------------------------------+
|   Overview:   Cristopher ring 2.16.18 ([facility]) for severe MR. POLY    |
| left. CABG too, SVG x 2 to OM and RCA.  Last Assessment & Plan:   |
|  Cristopher ring 2.16.18 ([facility]) for severe MR. POLY left. CABG too, |
|  SVG x 2 to OM and RCA.                                           |
+-------------------------------------------------------------------+
 
 
 
+--------------+------------+
| S/P CABG x 2 | 2018 |
+--------------+------------+
 
 
 
 
+------------------------------------------------------------------+
|   Overview:   SVG's to OM and RCA at time of MV ring Cristopher    |
| #28. POLY left.  Last Assessment & Plan:   18: Mitral valve  |
| repair with a 28 Cristopher annuloplasty ring; CABG x2 (SVG to OM  |
| and SVG to RCA)Plan:ASA, statin, BB and ARB                      |
|ASA, statin, BB and ARB                                           |
+------------------------------------------------------------------+
 
 
 
+----------------------------------+------------+
| Cardiomyopathy, unspecified type | 2018 |
+----------------------------------+------------+
 
 
 
+-------------------------------------------------------------------+
|   Overview:   Ischemic (3 V CAD) and non ischemic (Hypertension   |
| and MR and DM and ESRD). Tolerated metoprolol, losartan but avoid |
|  spironolactone due to ESRD. Echocardiogram done on 10/24/18      |
| shows mild biatrial dilatation, mild left ventricular dilatation  |
| with a mild eccentric left ventricular hypertrophy, there is a    |
| severe global hypokinesis of the left ventricle, overall, left    |
| ventricular systolic function is severely decreased, LVEF is      |
| 25-30%, mildly thickened and calcified trileaflet aortic valve    |
| with adequate opening, there is aortic valve sclerosis without    |
| significant aortic valve stenosis, there is a mild aortic valve   |
| insufficiency, mildly thickened and calcified mitral valve        |
| suggesting myxoma change with evidence of mitral valve repair,    |
| there is a mild to mitral valve stenosis and a mild mitral valve  |
| regurgitation, mild mitral annular calcification, mild tricuspid  |
| valve regurgitation, mild pulmonary hypertension with a peak      |
| systolic pressure 40-45 mmHg, normal IVC with normal respiratory  |
| collapse, when compared to echocardiography on 18, left      |
| ventricular systolic function continued to be worsened. Johnie    |
| MD Dora  Last Assessment & Plan:   Severe ischemic          |
| cardiomyopathy (EF 15%) and ESRD on HD who presented with dyspnea |
|  related to bilateral pleural effusions and episodic AF with RVR  |
| now s/p thoracentesis with significant improvement in her         |
| symptoms and in NSR. Plan:Volume removal with dialysis as         |
| toleratedLasix 80 mg twice a day Coreg and low dose losartan. Up  |
| titrate as tolerated but working on fluid removal and maintaining |
|  adequate BP. Strict I/O with daily weights.                      |
+-------------------------------------------------------------------+
 
 
 
+--------------------------------------------------------------+---+
| NSTEMI (non-ST elevated myocardial infarction)               |   |
+--------------------------------------------------------------+---+
| Coronary artery disease involving native coronary artery of  |   |
| native heart without angina pectoris                         |   |
+--------------------------------------------------------------+---+
 
 
 
+-------------------------------------------------------------------+
|   Overview:   Stress test on 2018 shows pharmacologic stress |
 
|  notable for baseline abnormal ECG without diagnostic changes     |
| post stress, mild LV enlargement with LVEF 33% reviewed,          |
| perfusion imaging notable for moderate sized high intensity area  |
| of reduced uptake involving the mid to apical inferior myocardium |
|  as well as the LV apex with minimal reversibility, consistent    |
| with patient's known occluded RCA, no significant additional      |
| ischemia is noted, overall this is consistent with the patient's  |
| known history of multivessel CAD and ischemic cardiomyopathy, by  |
| Anthony Gunn MD.Echocardiogram on 2018 shows mild left     |
| ventricular chamber dilatation with upper normal wall thickness,  |
| moderate global left ventricular systolic dysfunction with a      |
| visually estimated left ventricular ejection fraction of 30-35%,  |
| mild left atrial chamber enlargement, surgically repaired mitral  |
| valve with normal function, mild aortic valve regurgitation, mild |
|  pulmonary arterial hypertension, when compared with prior        |
| echocardiogram dated 18, no significant interval change, by |
|  Dejan Breaux MD. C on 2012 shows totally occluded right |
|  coronary artery with collateral filling from the left, no        |
| significant stenoses within the left anterior descending artery   |
| and circumflex artery. Patent stents within the left anterior     |
| descending artery, normal intracardiac pressure, mild narrowing   |
| within the distal aorta and iliac arteries. - Sherie Bartholomew,   |
| MDEchocardiogram on 2017 shows Study: a 2-dimensional        |
| transthoracic echocardiogram with m-mode, spectral and color flow |
|  Doppler was performed, left ventricular systolic function is     |
| low-normal with, an EF between 50 - 55 %, the left ventricle      |
| cavity size is normal, the RV is normal in size and function, the |
|  left atrium is normal in size, the right atrium is normal in     |
| size, aortic valve is trileaflet and is mildly thickened, there   |
| is mild aortic regurgitation, there is no evidence of aortic      |
| stenosis, the mitral valve is normal. - Kyler Pettit [facility] |
|  on 2017 shows severe triple-vessel coronary artery disease   |
| with moderate ostial left anterior descending artery and patent   |
| stent in the midsegment, moderate stenosis in the second obtuse   |
| marginal branch and total occlusion of the proximal right         |
| coronary artery, which is known from before, given the patient's  |
| symptoms at this time, recommendation given. The patient          |
| understands. We will proceed with further medical management with |
|  blood pressure control. Also, we will optimize our blood         |
| pressure management. Further evaluation for the ostial left       |
| anterior descending artery and circumflex lesion upon recurrent   |
| symptoms for the patient, the patient will be evaluated for any   |
| further symptoms and fractional flow reserve of the left anterior |
|  descending artery and possible intervention on the left          |
| circumflex system will be considered. - [facility]   |
| on 2017 shows severe 3-vessel coronary artery disease with   |
| moderate to severe proximal left anterior descending with         |
| significant fractional flow reserve value of 0.77, severe mid     |
| large marginal branch, and chronically occluded right coronary    |
| artery with left-to-right collaterals, normal left ventricular    |
| end-diastolic pressure. - [facility]     |
| shows three-vessel coronary artery disease with a chronically     |
| occluded right coronary artery with left-to-right collaterals,    |
| severe proximal left anterior descending artery with a patent     |
| stent in the mid left anterior descending artery and a            |
| significant FFR value of 0.77 during diagnostic angiogram on      |
| 2017, and severe disease of the second marginal branch |
|  of the left circumflex artery, successful PTCA and stenting of   |
| the second marginal branch of the left circumflex artery using a  |
| 2.25 x 16 and a 2.25 x 8 mm overlapping synergy drug-eluting      |
 
| stents postdilated using a 2.25 noncompliant balloon, successful  |
| direct stenting of the proximal and ostium of the left anterior   |
| descending artery using a 3.5 x 16 mm Synergy drug-eluting stent  |
| postdilated using a 3.5 noncompliant balloon. - Marcos Gamboa, |
|  MDEchocardiogram on 2018 shows the left ventricle is normal |
|  in size, wall thickness and moderately impaired systolic         |
| function EF 35-40%. Inferior, inferolateral and anterolateral     |
| hypokinesis, the right ventricle is normal in size and function,  |
| severe mitral regurgitation with moderately dilated left atrium,  |
| mild tricuspid regurgitation and mild pulmonary hypertension RVSP |
|  48 mmHg, there is no pericardial effusion, new wall motion       |
| abnormalities and new severe mitral regurgitation compared to     |
| prior study. - ALYCIA Diane on 2018 shows         |
| three-vessel coronary artery disease with widely patent stent in  |
| the left anterior descending artery and severe stent restenosis   |
| in the marginal branch, occluded right coronary artery with       |
| collateral from left to right, severe left ventricular            |
| dysfunction with severe mitral regurgitation, status post         |
| percutaneous transluminal coronary angioplasty of in-stent        |
| restenosis within the marginal branch, recommend consideration    |
| for mitral valve replacement and 2-vessel coronary artery bypass  |
| surgery to the right coronary artery and marginal branch. - Iyad  |
| TONIA ChanE and CABG x2 on 2018 shows Severely reduced LV  |
| systolic function. Visually estimated LVEF 25%, normal LV size    |
| and shape. Normal wall thickness, normal RV size with normal      |
| function, LA enlarged. POLY normal size without SEC, masses, or    |
| thrombi. IAS intact, no PFO detected, mitral valve with bileaflet |
|  thickening and severely restricted motion, associated with       |
| ventricular tethering. Severe functional MR with central jet,     |
| trileaflet aortic valve with mild sclerosis. No significant       |
| aortic stenosis. Mild AI with central jet, tricuspid valve normal |
|  structure and function. Trace TR, pulmonic valve normal Trace    |
| TN, visible portions of ascending aorta and arch normal. Grade II |
|  atherosclerotic disease of descending aorta, pulmonary artery    |
| normal, normal pericardium. No pericardial or pleural effusions,  |
| left ventricular function slightly improved and right ventricular |
|  function unchanged compared to preop,  28 mm mitral valve        |
| annuloplasty ring is well-seated with an acceptable inflow        |
| gradient across the mitral valve and no MR noted, no change in    |
| the aortic, tricuspid, or pulmonic valves compared to preop, no   |
| change in visible portions of aorta after decannulation, LV and   |
| RV function unchanged after sternal closure. - Jagjit Hickey,       |
| MDEchocardiogram on 3/7/2018 shows a complete two-dimensional     |
| transthoracic echocardiogram was performed (2D, M-mode, Doppler   |
| and color flow Doppler), left ventricular systolic function is    |
| severely reduced, the visually estimated LV ejection fraction is  |
| 15%, the left and right atrial chambers are both normal in size,  |
| there is mild mitral regurgitation, an annuloplasty ring is noted |
|  in the mitral position, there is mild tricuspid regurgitation,   |
| Estimated PA pressure is 42 mmHg, the aortic valve leaflets       |
| appear mildly calcified, the aortic valve opens well, the aortic  |
| valve mean systolic gradient was measured at 9 mm Hg. - Star    |
| MD Alvarez  Last Assessment & Plan:   GEORGI in [facility] 5 months |
|  ago. 1 week off Plavix for CAGG/MVr surgery 16. Now and in the |
|  past with PAFAlso statin - data in dialysis patients for primary |
|  prevention with statin is not beneficial but this is secondary   |
| prevention. However, instead of high dose statin, moderate dose   |
| due to ESRD with slight increased risk of rhabdo. - Warfarin -    |
| ASA - Moderate dose Statin                                        |
+-------------------------------------------------------------------+
 
 
 
 
+----------------------+---+
| Mixed hyperlipidemia |   |
+----------------------+---+
 
 
 
+-------------------------------------------------------------+
|   Last Assessment & Plan:   Moderate dose statin and don't  |
| titrate due to ESRD and ASHD. - Continue Atorvastatin 20mg  |
+-------------------------------------------------------------+
 
 
 
+------------------------------------------------------------------+---+
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
| dialysis, with long-term current use of insulin                  |   |
+------------------------------------------------------------------+---+
| ESRD (end stage renal disease)                                   |   |
+------------------------------------------------------------------+---+
 
 
 
+--------------------------------------------------------+
|   Last Assessment & Plan:   Dialysis Tues, Thurs, Sat. |
| 2 L removal on HD yesterday and 1.1 L today            |
| Nephrology following.                                  |
| Chronic ESRD anemia.                                   |
|                                                        |
| Plan:                                                  |
| Continue Lasix and HD                                  |
+--------------------------------------------------------+
 
 
 
+-------------------------+---+
| Hypertension, essential |   |
+-------------------------+---+
 
 
 
+------------------------------------------------------------------+
|   Last Assessment & Plan:   Will resume metoprolol and losartan  |
| as BP allows.                                                    |
+------------------------------------------------------------------+
 
 
 
+----------------------------------------------+---+
| COPD (chronic obstructive pulmonary disease) |   |
+----------------------------------------------+---+
| Severe mitral regurgitation                  |   |
+----------------------------------------------+---+
 
 
 
 Resolved Problems
 
 
 
+-------------------------------+----------+-----------+
| Problem                       | Noted    | Resolved  |
|                               | Date     | Date      |
+-------------------------------+----------+-----------+
| Junctional cardiac arrhythmia | 20 |  |
|                               | 18       | 8         |
+-------------------------------+----------+-----------+
 
 
 
+----------------------------------------------------------------+
|   Last Assessment & Plan:   Persistent. Will stop Amiodarone.  |
+----------------------------------------------------------------+
 
 
 
 Encounters
 
 
+--------+-----------+-----------+----------------------+----------------------+
| Date   | Type      | Specialty | Care Team            | Description          |
+--------+-----------+-----------+----------------------+----------------------+
| / | Telephone |           |   Johnie John, | Other (angina)       |
| 2019   |           |           |  MD                  |                      |
+--------+-----------+-----------+----------------------+----------------------+
| / | Implant   |           |   Johnie John, | Remote Device        |
|    | Monitor   |           |  MD                  | Interrogation        |
|        |           |           |                      | (Primary Dx); ICD    |
|        |           |           |                      | (implantable         |
|        |           |           |                      | cardioverter-defibri |
|        |           |           |                      | llator) [place]       |
|        |           |           |                      | Scientific  18   |
|        |           |           |                      | Dora;           |
|        |           |           |                      | Cardiomyopathy,      |
|        |           |           |                      | unspecified type     |
|        |           |           |                      | (HCC)                |
+--------+-----------+-----------+----------------------+----------------------+
| / | Telephone |           |   Johnie John, | Lab Order (Pt due    |
2019   |           |           |  MD                  | for labs prior to    |
|        |           |           |                      | appt. )              |
+--------+-----------+-----------+----------------------+----------------------+
| / | Telephone |           |   Johnie John, | Other (medication    |
|    |           |           |  MD                  | changed due to ER    |
|        |           |           |                      | visit)               |
+--------+-----------+-----------+----------------------+----------------------+
| / | Telephone |           |   Johnie John, | Other ([facility])     |
2019   |           |           |  MD                  |                      |
+--------+-----------+-----------+----------------------+----------------------+
| / | Telephone |           |   Drew,        | Other (heart issues) |
|    |           |           | JANETTE Plunkett         |                      |
+--------+-----------+-----------+----------------------+----------------------+
 from Last 3 Months
 
 Family History
 
 
+------------------+-----------+------+----------+
| Medical History  | Relation  | Name | Comments |
+------------------+-----------+------+----------+
 
| High cholesterol | Father    |      |          |
+------------------+-----------+------+----------+
| Hypertension     | Father    |      |          |
+------------------+-----------+------+----------+
| PVD              | Father    |      |          |
+------------------+-----------+------+----------+
| Dementia         | Mother    |      |          |
+------------------+-----------+------+----------+
| Diabetes         | Paternal  |      |          |
|                  | Grandmoth |      |          |
|                  | er        |      |          |
+------------------+-----------+------+----------+
| Kidney disease   | Neg Hx    |      |          |
+------------------+-----------+------+----------+
 
 
 
+----------------------+------+----------+----------+
| Relation             | Name | Status   | Comments |
+----------------------+------+----------+----------+
| Father               |      |  | PVD      |
+----------------------+------+----------+----------+
| Mother               |      |  | Dementia |
+----------------------+------+----------+----------+
| Paternal Grandmother |      |  |          |
+----------------------+------+----------+----------+
 
 
 
 Social History
 
 
+---------------+------------+-----------+--------+------------------+
| Tobacco Use   | Types      | Packs/Day | Years  | Date             |
|               |            |           | Used   |                  |
+---------------+------------+-----------+--------+------------------+
| Former Smoker | Cigarettes | 1         | 30     | Quit: 2004 |
+---------------+------------+-----------+--------+------------------+
 
 
 
+---------------------+---+---+---+
| Smokeless Tobacco:  |   |   |   |
| Never Used          |   |   |   |
+---------------------+---+---+---+
 
 
 
+-------------+-----------+---------+----------+
| Alcohol Use | Drinks/We | oz/Week | Comments |
|             | ek        |         |          |
+-------------+-----------+---------+----------+
| No          |           |         |          |
+-------------+-----------+---------+----------+
 
 
 
+------------------+---------------+
| Sex Assigned at  | Date Recorded |
| Birth            |               |
 
+------------------+---------------+
| Not on file      |               |
+------------------+---------------+
 
 
 
+----------------+-------------+-------------+
| Job Start Date | Occupation  | Industry    |
+----------------+-------------+-------------+
| Not on file    | Not on file | Not on file |
+----------------+-------------+-------------+
 
 
 
+----------------+--------------+------------+
| Travel History | Travel Start | Travel End |
+----------------+--------------+------------+
 
 
 
+-------------------------------------+
| No recent travel history available. |
+-------------------------------------+
 
 
 
 Last Filed Vital Signs
 
 
+-------------------+----------------------+---------------------+
| Vital Sign        | Reading              | Time Taken          |
+-------------------+----------------------+---------------------+
| Blood Pressure    | 125/55               | 2018 PDT |
+-------------------+----------------------+---------------------+
| Pulse             | 62                   | 2018 PDT |
+-------------------+----------------------+---------------------+
| Temperature       | 37   C (98.6   F)    | 2018 PDT |
+-------------------+----------------------+---------------------+
| Respiratory Rate  | 16                   | 2018 PDT |
+-------------------+----------------------+---------------------+
| Oxygen Saturation | 97%                  | 2018 PDT |
+-------------------+----------------------+---------------------+
| Inhaled Oxygen    | -                    | -                   |
| Concentration     |                      |                     |
+-------------------+----------------------+---------------------+
| Weight            | 65.3 kg (143 lb 15.4 | 2018 PDT |
|                   |  oz)                 |                     |
+-------------------+----------------------+---------------------+
| Height            | 177.8 cm (5' 10")    | 09/15/2018 0045 PDT |
+-------------------+----------------------+---------------------+
| Body Mass Index   | 20.66                | 09/15/2018 0045 PDT |
+-------------------+----------------------+---------------------+
 
 
 
 Plan of Treatment
 
 
+--------+---------+-----------+----------------------+-------------+
| Date   | Type    | Specialty | Care Team            | Description |
 
+--------+---------+-----------+----------------------+-------------+
| / | Office  |           |   Johnie John, |             |
|    | Visit   |           |  MD  [address] |             |
|        |         |           |    [place],   |             |
|        |         |           | [place] [address]             |             |
|        |         |           | [phone]         |             |
|        |         |           | [phone] (Fax)   |             |
+--------+---------+-----------+----------------------+-------------+
 
 
 
+----------------------+-----------+------------+----------+
| Health Maintenance   | Due Date  | Last Done  | Comments |
+----------------------+-----------+------------+----------+
| Hepatitis C          |  |            |          |
| Screening            | 9         |            |          |
+----------------------+-----------+------------+----------+
| Diabetic Eye Exam    |  |            |          |
|                      | 7         |            |          |
+----------------------+-----------+------------+----------+
| Diabetic Foot Exam   |  |            |          |
|                      | 7         |            |          |
+----------------------+-----------+------------+----------+
| Vaccine:             |  |            |          |
| Dtap/Tdap/Td (1 -    | 8         |            |          |
| Tdap)                |           |            |          |
+----------------------+-----------+------------+----------+
| Breast Cancer        |  |            |          |
| Screening (Ages      | 9         |            |          |
| 50-74)               |           |            |          |
+----------------------+-----------+------------+----------+
| Colorectal Cancer    |  |            |          |
| Screening            | 9         |            |          |
| (Colonoscopy)        |           |            |          |
+----------------------+-----------+------------+----------+
| Vaccine: Zoster (1   |  |            |          |
| of 2)                | 9         |            |          |
+----------------------+-----------+------------+----------+
| Vaccine:             |  | 2012 |          |
| Pneumococcal 65+     | 4         |            |          |
| High/Highest Risk (1 |           |            |          |
|  of 2 - PCV13)       |           |            |          |
+----------------------+-----------+------------+----------+
| Adult Annual         |  |            |          |
| Wellness Visit       | 6         |            |          |
+----------------------+-----------+------------+----------+
| Hemoglobin A1c       | 05/15/201 | 02/15/2018 |          |
| Screening            | 8         |            |          |
+----------------------+-----------+------------+----------+
| Vaccine: Influenza   |  |            |          |
| (Season Ended)       | 9         |            |          |
+----------------------+-----------+------------+----------+
 
 
 
 Implants
 
 
+-------------------------------+--------+-------+-------------+--------+--------+--------+
| Implanted                     | Type   | Area  | Manufacture | Device | Shelf  | Model  |
 
|                               |        |       | r           |        | Expira | /      |
|                               |        |       |             | Identi | tion   | Serial |
|                               |        |       |             | fier   | Date   |  / Lot |
+-------------------------------+--------+-------+-------------+--------+--------+--------+
| Ring Anndarwint Cosgrv 28mm -   | Generi | N/A:  | CHRIS     |        | / | [id number] |
| [id number]: Qty: 1 on  | c      | Heart | LIFESCIENCE |        | 2022   | MM     |
| 2018 by Anthony Beth   |        |       | [employer] -     |        |        | /[id number] |
| MD JIMMY                         |        |       | EDLS        |        |        | 34 /   |
+-------------------------------+--------+-------+-------------+--------+--------+--------+
| Dynagen El Icd Vr Implanted:  | Implan | N/A:  | [employer]OSTON      |        | / | D150   |
| Qty: 1 on 2018 by       | table  | Chest | [employer]  |        |    | /[id number] |
| Johnie John MD         | Cardio |       | [employer] [employer] |        |        | 3      |
|                               | verter |       |             |        |        | / |
|                               |        |       |             |        |        | 9      |
|                               | Defibr |       |             |        |        |        |
|                               | illato |       |             |        |        |        |
|                               | r      |       |             |        |        |        |
+-------------------------------+--------+-------+-------------+--------+--------+--------+
| Endotak [place] [employer]g           | Lead   | N/A:  | BOSTON      |        | 10/21/ | 0292   |
| Implanted: Qty: 1 on          |        | Chest | [employer]  |        |    | /[id number] |
| 2018 by Dora,      |        |       | [employer] [employer] |        |        | 5      |
| MD Johnie                    |        |       |             |        |        | /[id number] |
|                               |        |       |             |        |        | 0      |
+-------------------------------+--------+-------+-------------+--------+--------+--------+
 
 
 
 Procedures
 
 
+-----------------+--------+-------------+----------------------+----------------------+
| Procedure Name  | Priori | Date/Time   | Associated Diagnosis | Comments             |
|                 | ty     |             |                      |                      |
+-----------------+--------+-------------+----------------------+----------------------+
| DEVICE          | Routin | 2019  |   Remote Device      |   Results for this   |
| INTERROGATION-  | e      | 23:59 PDT   | Interrogation   ICD  | procedure are in the |
| REMOTE          |        |             | (implantable         |  results section.    |
|                 |        |             | cardioverter-defibri |                      |
|                 |        |             | llator) [facility]       |                      |
|                 |        |             | Scientific  18   |                      |
|                 |        |             | Dora            |                      |
|                 |        |             | Cardiomyopathy,      |                      |
|                 |        |             | unspecified type     |                      |
|                 |        |             | (HCC)                |                      |
+-----------------+--------+-------------+----------------------+----------------------+
 from Last 3 Months
 
 Results
 Device Interrogation - Remote (2019 23:59 PDT)
 
+-----------------------------------------------------------------------+--------------+
| Narrative                                                             | Performed At |
+-----------------------------------------------------------------------+--------------+
|   This result has an attachment that is not available.  Johnie        |   [facility]    |
| MD Dora         2019 15:05Date of Remote Interrogation:    |              |
| 19 Refer to [employer] documentation and remote PDF scanned into    |              |
| EPIC for remote interrogation results.    Data collected by Clementina SALAZAR     |              |
| NAHUN Maynard Presenting rhythm:    sinus rhythm 59-60 beats.0           |              |
| ventricular high rate episodes. No tachycardia therapies recommended  |              |
| or delivered.Histogram    good.     Battery longevity                 |              |
 
| 12    years.Apparent normal and stable device function.Device         |              |
| interrogation due in office in 2019.                        |              |
|recommended or delivered.                                              |              |
|Histogram    good.     Battery longevity 12    years.                 |              |
|Apparent normal and stable device function.                            |              |
|Device interrogation due in office in 2019.                  |              |
|                                                                       |              |
+-----------------------------------------------------------------------+--------------+
 
 
 
+--------------+---------+--------------------+--------------+
| Performing   | Address | City/State/Zipcode | Phone Number |
| Organization |         |                    |              |
+--------------+---------+--------------------+--------------+
|   [facility]    |         |                    |              |
+--------------+---------+--------------------+--------------+
 from Last 3 Months
 
 Insurance
 
 
+-----------------+--------+-------------+--------+-------------+---------+--------+
| Payer           | Benefi | Subscriber  | Effect | Phone       | Address | Type   |
|                 | t Plan | ID          | rakesh    |             |         |        |
|                 |  /     |             | Dates  |             |         |        |
|                 | Group  |             |        |             |         |        |
+-----------------+--------+-------------+--------+-------------+---------+--------+
| MEDICARE        | MEDICA | [id number]  | 20 | [phone] |         | [employer] |
|                 | RE     |             | 03-Pre | 5           |         | re     |
|                 | PART A |             | sent   |             |         |        |
|                 |  AND B |             |        |             |         |        |
+-----------------+--------+-------------+--------+-------------+---------+--------+
| MEDICAID [place] | MEDICA | [id number]    |  | [phone] |         | Medica |
|                 | [place]  |             | 018-Pr | 2           |         | id     |
|                 | PLUS   |             | esent  |             |         |        |
+-----------------+--------+-------------+--------+-------------+---------+--------+
 
 
 
+----------------------+--------+-------------+--------+-------------+-----------------+
| Guarantor Name       | Accoun | Relation to | Date   | Phone       | Billing Address |
|                      | t Type |  Patient    | of     |             |                 |
|                      |        |             | Birth  |             |                 |
+----------------------+--------+-------------+--------+-------------+-----------------+
| Cherie Villalobos | Person | Self        | / |             |   [address]    |
|                      | al/Fam |             | 1950   | [phone] | [place]    |
|                      | melina    |             |        | 0 (Home)    | [phone]      |
+----------------------+--------+-------------+--------+-------------+-----------------+
 
 
 
 Advance Directives
 Patient has advance care planning documents, and code status on file. For more information,
 please contact:[facility] [place] [address]
 
+-------------+-------------+-------------+----------+
| Code Status | Date        | Date        | Comments |
|             | Activated   | Inactivated |          |
+-------------+-------------+-------------+----------+
 
| Full Code   | 9/15/2018   | 2018   |          |
|             | 0:51        | 19:33       |          |
+-------------+-------------+-------------+----------+
 
 
 
+-----------+-----------+------------+---+
|           |           |            |   |
+-----------+-----------+------------+---+
| Full Code | 3/6/2018  | 3/16/2018  |   |
|           | 17:36     | 13:11      |   |
+-----------+-----------+------------+---+
 
 
 
+-----------+------------+------------+---+
|           |            |            |   |
+-----------+------------+------------+---+
| Full Code | 2018  | 2018  |   |
|           | 17:49      | 14:01      |   |
+-----------+------------+------------+---+

## 2019-04-09 NOTE — XMS
Encounter Summary
  Created on: 2019
 
 Cherie Villalobos
 External Reference #: [id number]
 : 48
 Sex: Female
 
 Demographics
 
 
+-----------------------+--------------------------+
| Address               | [address]               |
|                       | [place]  [address] |
+-----------------------+--------------------------+
| Home Phone            | [phone]          |
+-----------------------+--------------------------+
| Preferred Language    | Unknown                  |
+-----------------------+--------------------------+
| Marital Status        |                   |
+-----------------------+--------------------------+
| Roman Catholic Affiliation | 1013                     |
+-----------------------+--------------------------+
| Race                  | Unknown                  |
+-----------------------+--------------------------+
| Ethnic Group          | Unknown                  |
+-----------------------+--------------------------+
 
 
 Author
 
 
+--------------+-----------------------+
| Author       | [employer] |
+--------------+-----------------------+
| Organization | [email or web address] |
+--------------+-----------------------+
| Address      | Unknown               |
+--------------+-----------------------+
| Phone        | Unavailable           |
+--------------+-----------------------+
 
 
 
 Support
 
 
+---------------+--------------+---------------------+-----------------+
| Name          | Relationship | Address             | Phone           |
+---------------+--------------+---------------------+-----------------+
| Anoop Villalobos | ECON         | [place], | [phone] |
|               |              |  [place]  [phone]     |                 |
+---------------+--------------+---------------------+-----------------+
| Jennifer Dickson | ECON         | [place]       | [phone] |
|               |              | [phone]               |                 |
+---------------+--------------+---------------------+-----------------+
 
 
 
 
 Care Team Providers
 
 
+-----------------------+------+-----------------+
| Care Team Member Name | Role | Phone           |
+-----------------------+------+-----------------+
| Ivy Couch DO      | PCP  | [phone] |
+-----------------------+------+-----------------+
 
 
 
 Encounter Details
 
 
+--------+------------+----------------------+-----------+-------------+
| Date   | Type       | Department           | Care Team | Description |
+--------+------------+----------------------+-----------+-------------+
| / | Procedure  |   [facility]    |           |             |
| 2019   | Pass       | [facility] [address]   |           |             |
|        |            | Floor [address] |           |             |
|        |            |   [address]     |           |             |
|        |            | [place] [address]   |           |             |
|        |            | [phone]         |           |             |
+--------+------------+----------------------+-----------+-------------+
 
 
 
 Social History
 
 
+---------------+------------+-----------+--------+------------------+
| Tobacco Use   | Types      | Packs/Day | Years  | Date             |
|               |            |           | Used   |                  |
+---------------+------------+-----------+--------+------------------+
| Former Smoker | Cigarettes | 1         | 30     | Quit: 2004 |
+---------------+------------+-----------+--------+------------------+
 
 
 
+---------------------+---+---+---+
| Smokeless Tobacco:  |   |   |   |
| Never Used          |   |   |   |
+---------------------+---+---+---+
 
 
 
+------------------------------+
| Comments: quite smoking  |
+------------------------------+
 
 
 
+-------------+-----------+---------+----------+
| Alcohol Use | Drinks/We | oz/Week | Comments |
|             | ek        |         |          |
+-------------+-----------+---------+----------+
| No          |           |         |          |
+-------------+-----------+---------+----------+
 
 
 
 
+------------------+---------------+
| Sex Assigned at  | Date Recorded |
| Birth            |               |
+------------------+---------------+
| Not on file      |               |
+------------------+---------------+
 as of this encounter
 
 Plan of Treatment
 
 
+--------+---------+-----------+----------------------+-------------+
| Date   | Type    | Specialty | Care Team            | Description |
+--------+---------+-----------+----------------------+-------------+
| 04/10/ | Office  | Podiatry  |   Srinivasan Jordan,  |             |
| 2019   | Visit   |           | DPM  [address], |             |
|        |         |           |  [place],  |             |
|        |         |           | [place] [address]             |             |
|        |         |           | [phone]         |             |
|        |         |           | [phone] (Fax)   |             |
+--------+---------+-----------+----------------------+-------------+
 as of this encounter
 
 Visit Diagnoses
 Not on filein this encounter

## 2019-04-09 NOTE — XMS
Encounter Summary
  Created on: 2019
 
 Cherie Villalobos
 External Reference #: [id number]
 : 48
 Sex: Female
 
 Demographics
 
 
+-----------------------+--------------------------+
| Address               | [address]               |
|                       | [place]  [address] |
+-----------------------+--------------------------+
| Home Phone            | [phone]          |
+-----------------------+--------------------------+
| Preferred Language    | Unknown                  |
+-----------------------+--------------------------+
| Marital Status        |                   |
+-----------------------+--------------------------+
| Scientologist Affiliation | [id number]                     |
+-----------------------+--------------------------+
| Race                  | Unknown                  |
+-----------------------+--------------------------+
| Ethnic Group          | Unknown                  |
+-----------------------+--------------------------+
 
 
 Author
 
 
+--------------+-----------------------+
| Author       | Sherry FiftyThree |
+--------------+-----------------------+
| Organization | [facility] |
+--------------+-----------------------+
| Address      | Unknown               |
+--------------+-----------------------+
| Phone        | Unavailable           |
+--------------+-----------------------+
 
 
 
 Support
 
 
+---------------+--------------+---------------------+-----------------+
| Name          | Relationship | Address             | Phone           |
+---------------+--------------+---------------------+-----------------+
| Anoop Villalobos | ECON         | [place], | [phone] |
|               |              |  [place]  [address]     |                 |
+---------------+--------------+---------------------+-----------------+
| Jennifer Dickson | ECON         | [place]       | [phone] |
|               |              | [address]               |                 |
+---------------+--------------+---------------------+-----------------+
 
 
 
 
 Care Team Providers
 
 
+-----------------------+------+-----------------+
| Care Team Member Name | Role | Phone           |
+-----------------------+------+-----------------+
| Ivy Montague DO      | PCP  | [phone] |
+-----------------------+------+-----------------+
 
 
 
 Reason for Referral
 Home Health Care (Routine)
 
+-------------+--------------+--------------+--------------+--------------+--------------+
| Status      | Reason       | Specialty    | Diagnoses /  | Referred By  | Referred To  |
|             |              |              | Procedures   | Contact      | Contact      |
+-------------+--------------+--------------+--------------+--------------+--------------+
| New Request |   Specialty  | Home Health  |   Diagnoses  |              |              |
|             | Services     | Services     |  Fall in     | Sanna,  |              |
|             | Required     |              | home,        | MD Angelique    |              |
|             |              |              | initial      | [address]    |              |
|             |              |              | encounter    | [address]VD         |              |
|             |              |              |              | [place], [place] |              |
|             |              |              |              |  [address]       |              |
|             |              |              |              | Phone:       |              |
|             |              |              |              | [phone] |              |
|             |              |              |              |   Fax:       |              |
|             |              |              |              | [phone] |              |
+-------------+--------------+--------------+--------------+--------------+--------------+
 
 
 
 
 Reason for Visit
 
 
+----------------+-------------+
| Reason         | Comments    |
+----------------+-------------+
| Referral       | Dr. Elliott    |
+----------------+-------------+
| Skin Complaint | right foot  |
+----------------+-------------+
 Auth/Cert
 
+--------+--------+-----------+--------------+--------------+---------------+
| Status | Reason | Specialty | Diagnoses /  | Referred By  | Referred To   |
|        |        |           | Procedures   | Contact      | Contact       |
+--------+--------+-----------+--------------+--------------+---------------+
|        |        |           |   Diagnoses  |              |   Kr 3rd    |
|        |        |           |  Generalized |              | [address] [place] |
|        |        |           |  weakness    |              |  P[facility]ilion     |
|        |        |           | Closed       |              | [address]     |
|        |        |           | displaced    |              | Blvd          |
|        |        |           | fracture of  |              | [place], [place]  |
|        |        |           | distal       |              | [address]  Phone: |
|        |        |           | phalanx of   |              |  [phone] |
|        |        |           | right great  |              |   Fax:        |
 
|        |        |           | toe, initial |              | [phone]  |
|        |        |           |  encounter   |              |               |
|        |        |           | Fall in      |              |               |
|        |        |           | home,        |              |               |
|        |        |           | initial      |              |               |
|        |        |           | encounter    |              |               |
|        |        |           | Cellulitis   |              |               |
|        |        |           | of right toe |              |               |
|        |        |           |   Fatigue,   |              |               |
|        |        |           | unspecified  |              |               |
|        |        |           | type         |              |               |
|        |        |           |              |              |               |
+--------+--------+-----------+--------------+--------------+---------------+
 
 
 
 
 Encounter Details
 
 
+--------+-----------+----------------------+----------------------+----------------------+
| Date   | Type      | Department           | Care Team            | Description          |
+--------+-----------+----------------------+----------------------+----------------------+
| / | Emergency |   [facility]    |   Cookson, Rachel M, | Fall in home,        |
| 2019 - |           | [facility]     |  DO  [address]  | initial encounter    |
|        |           | [address]        |  [place] [address]  | (Primary Dx);        |
| 02/15/ |           | [facility]  [address]  |  [phone]        | Cellulitis of right  |
| 2019   |           | Blvd  [place], [place]   | Ginny Raya MD  | toe; Closed          |
|        |           | [address]  [phone]  |  [address]      | displaced fracture   |
|        |           |                      | [place] [address]   | of distal phalanx of |
|        |           |                      | [phone]         |  right great toe,    |
|        |           |                      | [phone] (Fax)   | initial encounter;   |
|        |           |                      | Angelique Isidro,   | Generalized          |
|        |           |                      | MD  [address]   | weakness; Fatigue,   |
|        |           |                      | [place] [address]   | unspecified type     |
|        |           |                      | [phone]         |                      |
|        |           |                      | [phone] (Fax)   |                      |
+--------+-----------+----------------------+----------------------+----------------------+
 
 
 
 Social History
 
 
+---------------+------------+-----------+--------+------------------+
| Tobacco Use   | Types      | Packs/Day | Years  | Date             |
|               |            |           | Used   |                  |
+---------------+------------+-----------+--------+------------------+
| Former Smoker | Cigarettes | 1         | 30     | Quit: 2004 |
+---------------+------------+-----------+--------+------------------+
 
 
 
+---------------------+---+---+---+
| Smokeless Tobacco:  |   |   |   |
| Never Used          |   |   |   |
+---------------------+---+---+---+
 
 
 
 
+------------------------------+
| Comments: quite smoking  |
+------------------------------+
 
 
 
+-------------+-----------+---------+----------+
| Alcohol Use | Drinks/We | oz/Week | Comments |
|             | ek        |         |          |
+-------------+-----------+---------+----------+
| No          |           |         |          |
+-------------+-----------+---------+----------+
 
 
 
+------------------+---------------+
| Sex Assigned at  | Date Recorded |
| Birth            |               |
+------------------+---------------+
| Not on file      |               |
+------------------+---------------+
 as of this encounter
 
 Last Filed Vital Signs
 
 
+-------------------+-------------------+-------------------------+
| Vital Sign        | Reading           | Time Taken              |
+-------------------+-------------------+-------------------------+
| Blood Pressure    | 112/57            | 02/15/2019 11:30 AM PST |
+-------------------+-------------------+-------------------------+
| Pulse             | 66                | 02/15/2019 11:30 AM PST |
+-------------------+-------------------+-------------------------+
| Temperature       | 36.7   C (98   F) | 02/15/2019 11:30 AM PST |
+-------------------+-------------------+-------------------------+
| Respiratory Rate  | 18                | 02/15/2019 11:30 AM PST |
+-------------------+-------------------+-------------------------+
| Oxygen Saturation | 96%               | 02/15/2019 11:30 AM PST |
+-------------------+-------------------+-------------------------+
| Inhaled Oxygen    | -                 | -                       |
| Concentration     |                   |                         |
+-------------------+-------------------+-------------------------+
| Weight            | 65.3 kg (144 lb)  | 2019 10:29 PM PST |
+-------------------+-------------------+-------------------------+
| Height            | 177.8 cm (5' 10") | 2019 10:29 PM PST |
+-------------------+-------------------+-------------------------+
| Body Mass Index   | 20.66             | 2019 10:29 PM PST |
+-------------------+-------------------+-------------------------+
 in this encounter
 
 Discharge Summaries
 Angelique Isidro MD - 02/15/2019  2:09 PM PSTFormatting of this note may be different fro
m the original.
 
 Patient:  Cherie Villalobos         :  1950     MRN:  [id number]
 Date of Admission:  2019  
 Date of Discharge:  2/15/2019   
 Treatment Team: 
 Consulting Physician: Srinivasan Abdi DPM
 Consulting Physician: Andrés Elliott MD
 
 Consulting Physician: João Asher MD
 Admitting Provider: Ginny Raya MD
 Discharging Provider:  ANGELIQUE ISIDRO MD
 Discharge Diagnoses: 
 Principal Problem:
   Falls frequently
 Active Problems:
   Depression
   ESRD (end stage renal disease) ([facility])
   Type 2 diabetes mellitus with chronic kidney disease on chronic dialysis, with long-term 
current use of insulin ([facility])
   Anemia in ESRD (end-stage renal disease) ([facility])
   Hypertension, essential
   Dyslipidemia
   Gastroesophageal reflux disease without esophagitis
   Loss of weight
   Severe protein-calorie malnutrition ([facility])
   Chronic systolic congestive heart failure ([facility])
   COPD (chronic obstructive pulmonary disease) ([facility])
   ICD (implantable cardioverter-defibrillator) in place
   Paroxysmal atrial fibrillation ([facility])
   S/P CABG x 2
   S/P mitral valve repair
   PVD (peripheral vascular disease) ([facility])
   CAD (coronary artery disease)
   Macrocytosis
   Lactic acidosis
   Restless legs syndrome
   Overactive bladder
 Resolved Problems:
   * No resolved hospital problems. *
     
 Procedures Performed:
 
 Chief Complaint:
 Referral (Dr. Elliott) and Skin Complaint (right foot )
 
 Hospital Course: 
 
 Frequent falls: 
 Assessment:  It was unclear initially  The exact culprit and it was felt potentially she wa
s a bit dry (as per nephrology who saw her this admission) as she had just had ultrafiltrati
on with HD, versus other culprit.  However the patient did state that when she has recently 
fallen it was in the setting of not using her walker as she had been instructed to as she wa
s only going a very short distance and felt she did not need it.  She will be very diligent 
about using her walker and taking all needed precautions.  
 
 Coronary artery disease with history of CABG, peripheral vascular disease, hypertension and
 hyperlipidemia with history of paroxysmal atrial fibrillation on anticoagulation:.  Will re
sume PTA meds as below on d/c 
 
  
 Overactive bladder: Continue oxybutynin. 
  
 Requip for restless leg syndrome.
  
 Anxiety depression: recommended to Refrain from using benzodiazepinesas much as possible an
d per d/w nephrology will decrease the ativan dose. 
 
  
 
 Diabetes mellitus with diabetic nephropathy with end-stage renal disease on hemodialysis: 
 Continue with current regimen for now, follow, and adjust as needed based on progress. 
 F/u with outpatient providers 
 
  
 
 With respect to her left foot prior injury and prior antibiotics: 
 Assessment:  She was seen by ID here and recently had completed a course of antbx reportedl
y.  They would like to have her off antbx for now and f/u withthem.  Should f/u with her pod
iatrist dr abdi for her foot as well. 
 
 Discharge Exam and Data:
 Vital Signs:
 /57 (BP Location: Right upper arm)  | Pulse 66  | Temp 98 F (36.7 C) (Oral)  | Re
sp 18  | Ht 1.778 m (5' 10")  | Wt 65.3 kg (144 lb)  | SpO2 96%  | Breastfeeding? No  | BMI 
20.66 kg/m 
 I&O Last 3 Shifts:  No intake/output data recorded.
 
 Physical Examination: 
 
 Constitutional: Alert and oriented to person, place, and time. Appears well-developed and w
ell-nourished. 
 
 HEENT: Neck supple, no JVD, non icteric sclera.
 
 Cardiovascular: Normal rate, regular rhythm, normal heart sounds, and intact distal pulses.
  Exam reveals no appreciated gallop or friction rub.  No murmur heard.
 
 Pulmonary/Chest: Effort normal and breath sounds normal. No stridor. No respiratory distres
s.  no wheezes. no rales. exhibits no tenderness. 
 
 Abdominal: Soft. Bowel sounds are normal. exhibits no distension. There is no tenderness. T
here is no rebound and no guarding. 
 
 Extremeties/Musculoskeletal: Normal general range of motion.exhibits no tenderness.  exhibi
ts no edema. Left foot in boot wrapped in dressing, see wound care and ID george . 
   
 Neurological:  Alert and oriented to person, place, and time. Has normal reflexes.  No morteza
s cranial nerve or focal deficit.  Exhibits normal muscle tone. Coordination normal.
 
 Skin: Skin is warm and dry. No rash noted. No erythema. No pallor. 
 
 Psychiatric: Has a normal mood and affect given situation. Behavior is normal. Judgment nor
mal for patient. 
 
 Recent Labs 
 Recent Labs
 Lab 19 
 WBC 5.14 
 HGB 10.1* 
 HCT 30.9* 
 * 
 
 Recent Labs
 Lab 19 
  
 K 4.7 
 CL 96* 
 
 CO2 >40* 
 BUN 9 
 CREATININE 2.96* 
 No results for input(s): INR in the last 168 hours.
 
 Results  
  Procedure Component Value Units Date/Time 
  Blood Culture Set 2 [[id number]] Collected:  19 
  Specimen:  Blood from Blood, peripheral draw Updated:  19 
  Blood Culture Set 1 [[id number]] Collected:  19 165 
  Specimen:  Blood from Blood Line Draw Updated:  19 
  
 
 Recent Radiology Results
 Xr Toe Right 2 View
 
 Result Date: 2019
 No radiographic evidence of osteomyelitis seen.  If further assessment is warranted, consid
er correlation with MRI. Potential acute fracture through the base of the distal phalanx. Si
gned by: Gavin South Sign Date/Time: 2019 5:15 PM
 
 Outstanding Issues:  
 F/u with podiatry, ID, PCP and resume HD.  
 
 Discharge Information:
 Follow up:
 Ivy Montague DO
 [address], [address]
 [facility] [id number] [phone]
 
 Schedule an appointment as soon as possible for a visit
 
 Srinivasan Abdi DPM
 [address] [facility] [address]
 [facility] [id number] [phone]
 
 Schedule an appointment as soon as possible for a visit
 please continue prior to admission plan
 
 Andrés Elliott MD
 [address] [facility] [address] [phone]
 
 Call
 please call to see when they would like to see you in follow up 
 
 resume care with all providers you were seeing prior to admission 
 
  
 Medication List 
  
 CHANGE how you take these medications  
 LORazepam 1 MG tablet
 Refills:  0
 Commonly known as:  ATIVAN
 Take 0.5 tablets by mouth every 8 (eight) hours as needed for Anxiety. Patient states she h
as the 1mg tablets at home and that they are scored and she will split them in half
 
 What changed:
  how much to take
  additional instructions
  
  
 CONTINUE taking these medications  
 albuterol 108 (90 Base) MCG/ACT inhaler
 Refills:  0
 Commonly known as:  PROVENTIL HFA;VENTOLIN HFA
  
 apixaban 2.5 MG tablet
 QTY:  60 tablet
 Refills:  0
 Commonly known as:  ELIQUIS
 Take 1 tablet by mouth 2 (two) times daily.
  
 atorvastatin 80 MG tablet
 Refills:  0
 Commonly known as:  LIPITOR
  
 carvedilol 12.5 MG tablet
 QTY:  60 tablet
 Refills:  0
 Doctor's comments:  Hold for SBP less than 100
 Hold for HR less than 60
 Commonly known as:  COREG
 Take 1 tablet by mouth 2 (two) times daily with meals.
  
 esomeprazole 40 MG capsule
 Refills:  0
 Commonly known as:  NEXIUM
  
 HYDROcodone-acetaminophen 5-325 MG per tablet
 QTY:  30 tablet
 Refills:  0
 Commonly known as:  NORCO
 Take 1 tablet by mouth every 4 (four) hours as needed.
  
 insulin aspart 100 UNIT/ML injection
 Refills:  0
 Commonly known as:  NOVOLOG
  
 insulin degludec 100 UNIT/ML injection
 Refills:  0
 Commonly known as:  TRESIBA
  
 isosorbide mononitrate 60 MG 24 hr tablet
 QTY:  30 tablet
 Refills:  1
 Take 1 tablet by mouth daily.
  
 losartan 100 MG tablet
 Refills:  0
 Commonly known as:  COZAAR
  
 nitroGLYCERIN 0.4 MG SL tablet
 QTY:  30 tablet
 Refills:  0
 Doctor's comments:  Hold for SBP less than 100
 Commonly known as:  NITROSTAT
 
 Place 1 tablet under the tongue every 5 (five) minutes as needed for Chest pain.
  
 nortriptyline 25 MG capsule
 Refills:  0
 Commonly known as:  PAMELOR
  
 ondansetron 4 MG disintegrating tablet
 Refills:  0
 Commonly known as:  ZOFRAN-ODT
  
 oxybutynin 5 MG tablet
 Refills:  0
 Commonly known as:  DITROPAN
  
 prochlorperazine 5 MG tablet
 QTY:  60 tablet
 Refills:  11
 Doctor's comments:  Please consider 90 day supplies to promote better adherence
 TAKE ONE TABLET BY MOUTH TWICE DAILY AS NEEDED FOR NAUSEA
  
 rOPINIRole 0.25 MG tablet
 Refills:  0
 Commonly known as:  REQUIP
  
 TRINTELLIX 20 MG Tabs
 Refills:  0
 Generic drug:  Vortioxetine HBr
  
  
 You might also be taking other medications not listed above. If you have questions about an
y of your other medications, talk to the person who prescribed them or your Primary Care Pro
vider. 
  
  
  
  
 Where to Get Your Medications 
  
 Information about where to get these medications is not yet available  
 Ask your nurse or doctor about these medications
  LORazepam 1 MG tablet
  
 
 Disposition:  Home
 Condition:  Stable
 Code Status:  Full Code
 
 Discharge took 35 minutes, to include final examination, discussion of admission, and prepa
ration of prescriptions, instructions for on-going care, follow-up and documentation of disc
harge summary.
 
 ANGELIQUE ISIDRO MD
 2:09 PMin this encounter
 
 Discharge Instructions
 Chandler Jean-Baptiste, [place] - 02/15/2019
 HOME HEALTH
 I certify that Cherie Villalobos is under my care and that I had a face to face encounter on  that meets the physician face to face encounter requirements. I certify that based on m
y findings , the patient is in need of intermittent skilled services and a plan for furnishi
 
ng these services to include, but not limited to the services listed in the questions below:
 
 Primary diagnosis for home health: Frequent Falls.
 Additional diagnosis: left great toe gangrene.
 Services needed: Physical & Occupational therapy.
 Patient is homebound because she cannot leave home without assistance of another individual
 and leaving the home requires a considerable and taxing effort. Patient needs the assistanc
e of another individual to leave home because: she has difficulty with ambulation and transf
ers. 
 Physician assuming care for Home Health services: IVY MONTAGUE
 
 FallPrevention
 Falls often occur due to slipping, tripping or losing your balance. Millions of people fall
 every year and injure themselves.Here are ways to reduce your risk of falling again.
  Think about your fall, was there anything that caused your fall that can be fixed, remov
ed, or replaced?
  Make your home safe by keeping walkways clear of objects you may trip over.
  Use non-slip pads under rugs. Do not use area rugs or small throw rugs.
  Use non-slip mats in bathtubs and showers.
  Install handrails and lights on staircases.
  Do not walk in poorly lit areas.
  Do not stand on chairs or wobbly ladders.
  Use caution when reaching overhead or looking upward.This position can cause a loss of
 balance.
  Be sure your shoes fit properly, have non-slip bottoms and are in good condition.
  Wear shoes both inside and out. Avoid going barefoot or wearing slippers.
  Be cautious when going up and down stairs, curbs, and when walking on uneven sidewalks.
  If your balance is poor, consider using a cane or walker.
  If your fall was related to alcohol use, stop or limit alcohol intake.
  If your fall was related to use of sleeping medicines, talk to your doctor about this.
You may need to reduce your dosage at bedtime if you awaken during the night to go to the [facility].
  To reduce the need for nighttime bathroom trips:
  Avoid drinking fluids for several hours before going to bed
  Empty your bladder before going to bed
  Men can keep a urinal at the bedside
  Stay as active as you can. Balance, flexibility, strength, and endurance all come from e
xercise. They all play a role in preventing falls. Ask your healthcare provider which types 
of activity are right for you.
  Get your vision checked on a regular basis.
  If you have pets, know where they are before you stand up or walk so you don't trip over
 them.
  Use night lights.
 Date Last Reviewed: 2015-2018 [employer]. [address] [place] [address]. All righ
ts reserved. This information is not intended as a substitute for professional medical care.
 Always follow your healthcare professional's instructions.
 
 in this encounter
 
 Medications at Time of Discharge
 
 
+----------------------+----------------------+--------+---------+----------+-----------+
| Medication           | Sig.                 | Disp.  | Refills | Start    | End Date  |
|                      |                      |        |         | Date     |           |
+----------------------+----------------------+--------+---------+----------+-----------+
|   albuterol          | Inhale 2 puffs into  |        |         |          |           |
| (PROVENTIL           | the lungs every 4    |        |         |          |           |
| HFA;VENTOLIN HFA)    | (four) hours as      |        |         |          |           |
 
| 108 (90 Base)        | needed for Wheezing. |        |         |          |           |
| MCG/ACT              |                      |        |         |          |           |
| inhalerIndications:  |                      |        |         |          |           |
| states uses 2x       |                      |        |         |          |           |
| monthly average      |                      |        |         |          |           |
+----------------------+----------------------+--------+---------+----------+-----------+
|   apixaban (ELIQUIS) | Take 1 tablet by     |   60   | 0       | 20 |           |
|  2.5 MG tablet       | mouth 2 (two) times  | tablet |         | 18       |           |
|                      | daily.               |        |         |          |           |
+----------------------+----------------------+--------+---------+----------+-----------+
|   atorvastatin       | Take 80 mg by mouth  |        |         | 20 |           |
| (LIPITOR) 80 MG      | nightly.             |        |         | 19       |           |
| tablet               |                      |        |         |          |           |
+----------------------+----------------------+--------+---------+----------+-----------+
|   carvedilol (COREG) | Take 1 tablet by     |   60   | 0       | 20 |  |
|  12.5 MG tablet      | mouth 2 (two) times  | tablet |         | 19       | 0         |
|                      | daily with meals.    |        |         |          |           |
+----------------------+----------------------+--------+---------+----------+-----------+
|   esomeprazole       | Take 1 capsule by    |        |         |          |           |
| (NEXIUM) 40 MG       | mouth every day      |        |         |          |           |
| capsule              |                      |        |         |          |           |
+----------------------+----------------------+--------+---------+----------+-----------+
|                      | Take 1 tablet by     |   30   | 0       | 20 |           |
| HYDROcodone-acetamin | mouth every 4 (four) | tablet |         | 19       |           |
| ophen (NORCO) 5-325  |  hours as needed.    |        |         |          |           |
| MG per tablet        |                      |        |         |          |           |
+----------------------+----------------------+--------+---------+----------+-----------+
|   insulin aspart     | Inject  into the     |        |         |          |           |
| (NOVOLOG) 100        | skin 3 (three) times |        |         |          |           |
| UNIT/ML injection    |  daily before meals. |        |         |          |           |
|                      |  Sliding scale       |        |         |          |           |
+----------------------+----------------------+--------+---------+----------+-----------+
|   insulin degludec   | Inject 21 units      |        |         |          |           |
| (TRESIBA) 100        | every night          |        |         |          |           |
| UNIT/ML injection    |                      |        |         |          |           |
+----------------------+----------------------+--------+---------+----------+-----------+
|   isosorbide         | Take 1 tablet by     |   30   | 1       | 20 |  |
| mononitrate (IMDUR)  | mouth daily.         | tablet |         | 18       | 9         |
| 60 MG 24 hr tablet   |                      |        |         |          |           |
+----------------------+----------------------+--------+---------+----------+-----------+
|   LORazepam (ATIVAN) | Take 0.5 tablets by  |        |         | 02/15/20 |           |
|  1 MG tablet         | mouth every 8        |        |         | 19       |           |
|                      | (eight) hours as     |        |         |          |           |
|                      | needed for Anxiety.  |        |         |          |           |
|                      | Patient states she   |        |         |          |           |
|                      | has the 1mg tablets  |        |         |          |           |
|                      | at home and that     |        |         |          |           |
|                      | they are scored and  |        |         |          |           |
|                      | she will split them  |        |         |          |           |
|                      | in half              |        |         |          |           |
+----------------------+----------------------+--------+---------+----------+-----------+
|   losartan (COZAAR)  | Take 100 mg by mouth |        |         | 20 |           |
| 100 MG tablet        |  daily.              |        |         | 19       |           |
+----------------------+----------------------+--------+---------+----------+-----------+
|   nitroGLYCERIN      | Place 1 tablet under |   30   | 0       | 20 |  |
| (NITROSTAT) 0.4 MG   |  the tongue every 5  | tablet |         | 19       | 0         |
| SL tablet            | (five) minutes as    |        |         |          |           |
|                      | needed for Chest     |        |         |          |           |
|                      | pain.                |        |         |          |           |
+----------------------+----------------------+--------+---------+----------+-----------+
 
|   nortriptyline      | Take 25-75 mg by     |        |         |          |           |
| (PAMELOR) 25 MG      | mouth See Admin      |        |         |          |           |
| capsule              | Instructions. Takes  |        |         |          |           |
|                      | 25 mg by mouth every |        |         |          |           |
|                      |  morning and 75 mg   |        |         |          |           |
|                      | every night          |        |         |          |           |
+----------------------+----------------------+--------+---------+----------+-----------+
|   ondansetron        | Take 4 mg by mouth   |        |         | 20 |           |
| (ZOFRAN-ODT) 4 MG    | every 8 (eight)      |        |         | 18       |           |
| disintegrating       | hours as needed.     |        |         |          |           |
| tablet               |                      |        |         |          |           |
+----------------------+----------------------+--------+---------+----------+-----------+
|   oxybutynin         | Take 7.5 mg by mouth |        |         |          |           |
| (DITROPAN) 5 MG      |  2 (two) times       |        |         |          |           |
| tablet               | daily.               |        |         |          |           |
+----------------------+----------------------+--------+---------+----------+-----------+
|   prochlorperazine 5 | TAKE ONE TABLET BY   |   60   | 11      | 20 |           |
|  MG tablet           | MOUTH TWICE DAILY AS | tablet |         | 19       |           |
|                      |  NEEDED FOR NAUSEA   |        |         |          |           |
+----------------------+----------------------+--------+---------+----------+-----------+
|   rOPINIRole         | Take 0.75 mg by      |        |         |          |           |
| (REQUIP) 0.25 MG     | mouth nightly.       |        |         |          |           |
| tablet               |                      |        |         |          |           |
+----------------------+----------------------+--------+---------+----------+-----------+
|   TRINTELLIX 20 MG   | Take 20 mg by mouth  |        |         | 20 |           |
| TABS                 | every evening.       |        |         | 19       |           |
+----------------------+----------------------+--------+---------+----------+-----------+
 as of this encounter
 
 Progress Notes
 Hortencia Vela RD - 02/15/2019  3:02 PM PSTFormatting of this note may be different from the
 original.
 
  02/15/19 1430 
 Subjective 
 Timepoint Admit
 (Consult - poor appetite) 
 Pt c/o Pt reports she is discharging today.  PO intake much improved.  Pt reports she plans
 on getting set up with a meal service.  Also noted that if she purchases food, caregiver wi
ll prepare meals for her.  Pt states she will eat anything if it prepared for her.  States s
he is discharging and has no needs today 
 Diet Experience 
 Self-selected diet(s) followed Pt reports she sees her RD at [facility] monthly.  She is not on
 a renal diet at this time due to poor po intake.  Pt has been eating a lot of soups even th
ough high in sodium.  Has protein shakes at home, but hasn't been drinking because she read 
an article about a boy who drank too many and  and now she is afraid to drink.   
 Food Intake 
 Amount of Food Pt reports she is eating >50% of meals 
 Type of Food / Meals Renal cardiac  
 Biochemical data, medical tests, and procedures reviewed 
 Biochemical data, medical tests, and procedures reviewed K WNL 
 Recommendations 
 Recommended energy needs Encourage po intake.  Discussed safe use with oral nutrition suppl
ements.  Will follow with RD at [facility].  Monitor and follow as indicated.   
 Nutritional Risk 
 Nutritional risk Moderate 
 Follow up date 19 
 Michael David MD - 02/15/2019  2:11 PM PSTFormatting of this note may be different f
rom the original.
 Infectious Disease
 
 Progress Note
 
 Hospital Day:   LOS: 0 days 
 SUBJECTIVE
 No acute event overnight.  No new complaint.  No fever.
 
 Antibiotics:IV vancomycin was given yesterday x1
 
 Allergy:
 Allergies 
 Allergen Reactions 
   Quinapril Hcl Anaphylaxis 
   Digoxin And Related Other (See Comments) 
   toxic 
   Metaxalone Other (See Comments) 
   Morphine Mental Changes 
   Make her crazy/ "funny feeling in my head"
 Has used hydromorphone in-house 
   Pantoprazole Sodium Nausea and Vomiting 
   Penicillins Rash 
   Tolerates cephalosporins 
   Quinapril Hcl Edema 
   Facial Edema 
   Sudafed [Pseudoephedrine Hcl] Rash 
 
 OBJECTIVE
 Vital Signs:
 /57 (BP Location: Right upper arm)  | Pulse 66  | Temp 98 F (36.7 C) (Oral)  | Re
sp 18  | Ht 1.778 m (5' 10")  | Wt 65.3 kg (144 lb)  | SpO2 96%  | Breastfeeding? No  | BMI 
20.66 kg/m 
 
 Examination:
 
 Constitutional: Resting in the recliner, not in acute distress.She is alert, awake, oriente
d well.
 HEENT: 
 Head: Normocephalic and Atraumatic. 
 Nose: Nose normal. 
 Neck: Neck supple.No palpable mass
 Cardiovascular: Not appreciate rubs or gallops
 Pulmonary/Chest:  Clear to auscultation bilaterally.
 Abdominal: Soft, bowel sounds are present, no distension, no ascites. 
 Musculoskeletal: Right great toe slightly swollen, there is a small skin tear with no surro
unding erythema or purulent drainage.Left TMA stump heals nicely with no evidence of infecti
on.
 Neurological:  No focal neurologic deficits. 
 Skin: Skin is warm and dry. No rash noted. 
 Psychiatric: Alert, oriented well.  No agitation.
 
 LABS:
 Recent Results (from the past 24 hour(s)) 
 Septic Lactic Acid 
  Collection Time: 19  4:55 PM 
 Result Value Ref Range 
  LACTIC ACID 2.7 (H) 0.4 - 2.0 mmol/L 
 C-Reactive Protein 
  Collection Time: 19  5:39 PM 
 Result Value Ref Range 
  CRP 0.8 (H) <0.5 mg/dL 
 CBC with differential 
 
  Collection Time: 19  5:39 PM 
 Result Value Ref Range 
  WBC 5.14 3.80 - 11.00 K/uL 
  RBC 2.91 (L) 3.70 - 5.10 M/uL 
  HGB 10.1 (L) 11.3 - 15.5 g/dL 
  HCT 30.9 (L) 34.0 - 46.0 % 
  .1 (H) 80.0 - 100.0 fl 
  MCH 34.8 (H) 27.0 - 34.0 pg 
  MCHC 32.8 32.0 - 35.5 g/dL 
  RDW SD 61.3 (H) 37 - 53 fl 
   (L) 150 - 400 K/uL 
  MPV 9.3 fl 
  DIFF TYPE AUTOMATED  
  NEUTROPHILS 74.03 % 
  LYMPHOCYTES 20.20 % 
  MONOCYTES 5.16 % 
  EOSINOPHILS 0.03 % 
  BASOPHILS 0.58 % 
  NEUTROPHILS ABS 3.81 1.90 - 7.40 K/uL 
  LYMPHOCYTES ABS 1.04 1.00 - 3.90 K/uL 
  MONOCYTES ABS 0.27 0.00 - 0.80 K/uL 
  EOSINOPHILS ABS 0.00 0.00 - 0.50 K/uL 
  BASOPHILS ABS 0.03 0.00 - 0.10 K/uL 
  MORPHOLOGY 1+  
  Platelet Estimate DECREASED  
 Comprehensive metabolic panel 
  Collection Time: 19  5:39 PM 
 Result Value Ref Range 
  SODIUM 143 135 - 145 mmol/L 
  POTASSIUM 4.7 3.5 - 4.9 mmol/L 
  CHLORIDE 96 (L) 99 - 109 mmol/L 
  CO2 >40 (HH) 23 - 32 mmol/L 
  ANION GAP AGAP UNABLE TO CALCULATE 5 - 20 mmol/L 
  GLUCOSE 160 (H) 65 - 99 mg/dL 
  BUN 9 8 - 25 mg/dL 
  CREATININE 2.96 (H) 0.50 - 1.00 mg/dL 
  BUN/CREAT 3  
  CALCIUM 9.4 8.5 - 10.5 mg/dL 
  TOTAL PROTEIN 6.7 6.3 - 8.2 g/dL 
  Albumin 4.3 3.3 - 4.8 g/dL 
  GLOBULIN 2.4 1.3 - 4.9 g/dL 
  A/G 1.8 1.0 - 2.4 
  TBIL 0.5 0.1 - 1.5 mg/dL 
  ALK PHOS 103 35 - 115 U/L 
  AST 54 (H) 10 - 45 U/L 
  ALT 40 10 - 65 U/L 
  EGFR 16 (L) >60 mL/min/1.73m2 
 Sedimentation Rate (ESR) 
  Collection Time: 19  5:39 PM 
 Result Value Ref Range 
  ESR 13 0 - 30 mm/Hr 
 Vitamin B12 
  Collection Time: 19  5:39 PM 
 Result Value Ref Range 
  VITAMIN B12 553 254 - 1,320 pg/mL 
 Folate 
  Collection Time: 19  5:39 PM 
 Result Value Ref Range 
  FOLATE 8.0 >5.4 ng/mL 
 TSH 
 
  Collection Time: 19  5:39 PM 
 Result Value Ref Range 
  TSH 0.904 0.450 - 5.100 uIU/mL 
 Septic Lactic Acid 
  Collection Time: 19  7:22 PM 
 Result Value Ref Range 
  LACTIC ACID 2.7 (H) 0.4 - 2.0 mmol/L 
 Septic Lactic Acid 
  Collection Time: 19 10:12 PM 
 Result Value Ref Range 
  LACTIC ACID 2.0 0.4 - 2.0 mmol/L 
 POCT glucose 
  Collection Time: 19 10:32 PM 
 Result Value Ref Range 
  GLUCOSE,POC SCREEN 202 (H) 65 - 99 mg/dL 
 POCT glucose 
  Collection Time: 02/15/19  6:01 AM 
 Result Value Ref Range 
  GLUCOSE,POC SCREEN 108 (H) 65 - 99 mg/dL 
 POCT glucose 
  Collection Time: 02/15/19 11:36 AM 
 Result Value Ref Range 
  GLUCOSE,POC SCREEN 231 (H) 65 - 99 mg/dL 
 
 Results  
  Procedure Component Value Units Date/Time 
  Blood Culture Set 2 [[id number]] Collected:  19 1739 
  Specimen:  Blood from Blood, peripheral draw Updated:  19 
  Blood Culture Set 1 [[id number]] Collected:  19 1655 
  Specimen:  Blood from Blood Line Draw Updated:  19 
  
 
 ASSESSMENT & PLAN
 This is a 68-year-old female with multiple comorbidities including End-stage renal disease,
 on regular hemodialysis via fistula at the left upper extremity.  History of severe periphe
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
A resection .  She completed the course of IV antibiotic and the wound is healing nicely wit
h no evidence of ongoing infection.  
 She was admitted at this time for frequent fall at home and noted to have right great toe f
racture, base of distal phalanx.  No intervention recommended at this point.  No evidence of
 ongoing skin and soft tissue infection at present.  Would agree to discharge home and follo
w-up with podiatrist.  Continue to off antibiotic per infectious disease standpoint
 
 Plan discussed with patient at length.  She can follow-up with infectious disease as needed
 if concern for recurrent infection.  Plan discussed with medicine team. Thank you for consu
herb David MD
 2/15/2019
 
 in this encounter
 
 Plan of Treatment
 
 
+--------+---------+-----------+----------------------+-------------+
| Date   | Type    | Specialty | Care Team            | Description |
+--------+---------+-----------+----------------------+-------------+
| 04/10/ | Office  | Podiatry  |   Srinivasan Abdi,  |             |
|    | Visit   |           | DPM  [address] [facility], |             |
 
|        |         |           |  [address]  [place],  |             |
|        |         |           | [place] [address]             |             |
|        |         |           | [phone]         |             |
|        |         |           | [phone] (Fax)   |             |
+--------+---------+-----------+----------------------+-------------+
 
 
 
+-------------------------+--------+----------------------+---------------------+
| Name                    | Priori | Associated Diagnoses | Order Schedule      |
|                         | ty     |                      |                     |
+-------------------------+--------+----------------------+---------------------+
| Referral to Home Health | Routin |   Fall in home,      | Ordered: 02/15/2019 |
|                         | e      | initial encounter    |                     |
+-------------------------+--------+----------------------+---------------------+
 as of this encounter
 
 Procedures
 
 
+----------------------+--------+-------------+----------------------+----------------------
+
| Procedure Name       | Priori | Date/Time   | Associated Diagnosis | Comments             
|
|                      | ty     |             |                      |                      
|
+----------------------+--------+-------------+----------------------+----------------------
+
| POCT GLUCOSE         | Routin | 02/15/2019  |                      |   Results for this   
|
|                      | e      | 11:36 AM    |                      | procedure are in the 
|
|                      |        | PST         |                      |  results section.    
|
+----------------------+--------+-------------+----------------------+----------------------
+
| POCT GLUCOSE         | Routin | 02/15/2019  |                      |   Results for this   
|
|                      | e      |  6:01 AM    |                      | procedure are in the 
|
|                      |        | PST         |                      |  results section.    
|
+----------------------+--------+-------------+----------------------+----------------------
+
| POCT GLUCOSE         | Routin | 2019  |                      |   Results for this   
|
|                      | e      | 10:32 PM    |                      | procedure are in the 
|
|                      |        | PST         |                      |  results section.    
|
+----------------------+--------+-------------+----------------------+----------------------
+
| [facility] SEPT[facility]ACT[facility]   | STAT   | 2019  |                      |   Results for this   
|
| ACID                 |        | 10:12 PM    |                      | procedure are in the 
|
|                      |        | PST         |                      |  results section.    
|
+----------------------+--------+-------------+----------------------+----------------------
+
 
| [facility]PT[facility]ACT[facility]   | STAT   | 2019  |                      |   Results for this   
|
| ACID                 |        |  7:22 PM    |                      | procedure are in the 
|
|                      |        | PST         |                      |  results section.    
|
+----------------------+--------+-------------+----------------------+----------------------
+
| BLOOD CULTURE, SET 2 | STAT   | 2019  |                      |   Results for this   
|
|                      |        |  5:39 PM    |                      | procedure are in the 
|
|                      |        | PST         |                      |  results section.    
|
+----------------------+--------+-------------+----------------------+----------------------
+
| SEDIMENTATION RATE,  | STAT   | 2019  |                      |   Results for this   
|
| AUTOMATED            |        |  5:39 PM    |                      | procedure are in the 
|
|                      |        | PST         |                      |  results section.    
|
+----------------------+--------+-------------+----------------------+----------------------
+
| CBC W/AUTO DIFF      | STAT   | 2019  |                      |   Results for this   
|
| (REFLEX TO MANUAL)   |        |  5:39 PM    |                      | procedure are in the 
|
|                      |        | PST         |                      |  results section.    
|
+----------------------+--------+-------------+----------------------+----------------------
+
| C-REACTIVE PROTEIN   | STAT   | 2019  |                      |   Results for this   
|
|                      |        |  5:39 PM    |                      | procedure are in the 
|
|                      |        | PST         |                      |  results section.    
|
+----------------------+--------+-------------+----------------------+----------------------
+
| TSH                  | STAT   | 2019  |                      |   Results for this   
|
|                      |        |  5:39 PM    |                      | procedure are in the 
|
|                      |        | PST         |                      |  results section.    
|
+----------------------+--------+-------------+----------------------+----------------------
+
| FOLATE               | Add-On | 2019  |                      |   Results for this   
|
|                      |        |  5:39 PM    |                      | procedure are in the 
|
|                      |        | PST         |                      |  results section.    
|
+----------------------+--------+-------------+----------------------+----------------------
+
| VITAMIN B12          | Add-On | 2019  |                      |   Results for this   
|
|                      |        |  5:39 PM    |                      | procedure are in the 
|
 
|                      |        | PST         |                      |  results section.    
|
+----------------------+--------+-------------+----------------------+----------------------
+
| COMPREHENSIVE        | STAT   | 2019  |                      |   Results for this   
|
| METABOLIC PANEL      |        |  5:39 PM    |                      | procedure are in the 
|
|                      |        | PST         |                      |  results section.    
|
+----------------------+--------+-------------+----------------------+----------------------
+
| XR TOES RIGHT        | ASAP   | 2019  |                      |   Results for this   
|
|                      |        |  5:08 PM    |                      | procedure are in the 
|
|                      |        | PST         |                      |  results section.    
|
+----------------------+--------+-------------+----------------------+----------------------
+
| [facility]PT[facility]ACT[facility]   | STAT   | 2019  |                      |   Results for this   
|
| ACID                 |        |  4:55 PM    |                      | procedure are in the 
|
|                      |        | PST         |                      |  results section.    
|
+----------------------+--------+-------------+----------------------+----------------------
+
| BLOOD CULTURE, SET 1 | STAT   | 2019  |                      |   Results for this   
|
|                      |        |  4:55 PM    |                      | procedure are in the 
|
|                      |        | PST         |                      |  results section.    
|
+----------------------+--------+-------------+----------------------+----------------------
+
| ED INFORMATION       | Routin | 2019  |                      |   Results for this   
|
| EXCHANGE             | e      |  3:21 PM    |                      | procedure are in the 
|
|                      |        | PST         |                      |  results section.    
|
+----------------------+--------+-------------+----------------------+----------------------
+
 in this encounter
 
 Results
 POCT glucose (02/15/2019 11:36 AM)
 
+--------------------+--------------------------+---------------+-----------------+
| Component          | Value                    | Ref Range     | Performed At    |
+--------------------+--------------------------+---------------+-----------------+
| GLUCOSE,POC SCREEN | 231 (H)Comment: Testing  | 65 - 99 mg/dL | [facility] |
|                    | performed [place] [facility] [place]     |               |                 |
|                    | [address];[place]   |               |                 |
|                    | [id number]                    |               |                 |
+--------------------+--------------------------+---------------+-----------------+
 
 
 
 
+-------------------+------------------+--------------------+--------------+
| Performing        | Address          | City/State/Zipcode | Phone Number |
| Organization      |                  |                    |              |
+-------------------+------------------+--------------------+--------------+
|   [facility] |   [address] | [facility] [place] [address] |              |
+-------------------+------------------+--------------------+--------------+
 POCT glucose (02/15/2019  6:01 AM)
 
+--------------------+--------------------------+---------------+-----------------+
| Component          | Value                    | Ref Range     | Performed At    |
+--------------------+--------------------------+---------------+-----------------+
| GLUCOSE,POC SCREEN | 108 (H)Comment: Testing  | 65 - 99 mg/dL | [facility] |
|                    | [place] [facility] [place]     |               |                 |
|                    | [address];[place]   |               |                 |
|                    | [id number]                    |               |                 |
+--------------------+--------------------------+---------------+-----------------+
 
 
 
+-------------------+------------------+--------------------+--------------+
| Performing        | Address          | City/State/Zipcode | Phone Number |
| Organization      |                  |                    |              |
+-------------------+------------------+--------------------+--------------+
|   [facility] |   [address] | [place] [address] |              |
+-------------------+------------------+--------------------+--------------+
 POCT glucose (2019 10:32 PM)
 
+--------------------+--------------------------+---------------+-----------------+
| Component          | Value                    | Ref Range     | Performed At    |
+--------------------+--------------------------+---------------+-----------------+
| GLUCOSE,POC SCREEN | 202 (H)Comment: Testing  | 65 - 99 mg/dL | [facility] |
|                    | performed at [facility] [place]     |               |                 |
|                    | [address];[place]   |               |                 |
|                    | [id number]                    |               |                 |
+--------------------+--------------------------+---------------+-----------------+
 
 
 
+-------------------+------------------+--------------------+--------------+
| Performing        | Address          | City/State/Zipcode | Phone Number |
| Organization      |                  |                    |              |
+-------------------+------------------+--------------------+--------------+
|   [facility] |   [address] | [place] [address] |              |
+-------------------+------------------+--------------------+--------------+
 Septic Lactic Acid (2019 10:12 PM)
 
+-------------+-------------------------+------------------+-----------------+
| Component   | Value                   | Ref Range        | Performed At    |
+-------------+-------------------------+------------------+-----------------+
| LACTIC ACID | 2.0Comment: Testing     | 0.4 - 2.0 mmol/L | [facility] |
|             | performed at [facility] [place]    |                  |                 |
|             | [facility];[place], [place]  |                  |                 |
|             | [address]                   |                  |                 |
+-------------+-------------------------+------------------+-----------------+
 
 
 
+-------------------+------------------+--------------------+--------------+
| Performing        | Address          | City/State/Zipcode | Phone Number |
| Organization      |                  |                    |              |
 
+-------------------+------------------+--------------------+--------------+
|   [facility] |   [address] | [place] [address] |              |
+-------------------+------------------+--------------------+--------------+
 Septic Lactic Acid (2019  7:22 PM)
 
+-------------+--------------------------+------------------+-----------------+
| Component   | Value                    | Ref Range        | Performed At    |
+-------------+--------------------------+------------------+-----------------+
| LACTIC ACID | 2.7 (H)Comment: Testing  | 0.4 - 2.0 mmol/L | [facility] |
|             | performed at [facility] [place]     |                  |                 |
|             | [address];[place]   |                  |                 |
|             | [address]                    |                  |                 |
+-------------+--------------------------+------------------+-----------------+
 
 
 
+-------------------+------------------+--------------------+--------------+
| Performing        | Address          | City/State/Zipcode | Phone Number |
| Organization      |                  |                    |              |
+-------------------+------------------+--------------------+--------------+
|   [facility] |   [address] | [place] [address] |              |
+-------------------+------------------+--------------------+--------------+
 TSH (2019  5:39 PM)
 
+-----------+-------------------------+----------------------+-----------------+
| Component | Value                   | Ref Range            | Performed At    |
+-----------+-------------------------+----------------------+-----------------+
| TSH       | 0.904Comment: Testing   | 0.450 - 5.100 uIU/mL | [facility] |
|           | performed at [facility] [place]    |                      |                 |
|           | [address];[place]  |                      |                 |
|           | [id number]                   |                      |                 |
+-----------+-------------------------+----------------------+-----------------+
 
 
 
+----------+
| Specimen |
+----------+
| Blood    |
+----------+
 
 
 
+-------------------+------------------+--------------------+--------------+
| Performing        | Address          | City/State/Zipcode | Phone Number |
| Organization      |                  |                    |              |
+-------------------+------------------+--------------------+--------------+
|   [facility] |   [address] | [place] [address] |              |
+-------------------+------------------+--------------------+--------------+
 Folate (2019  5:39 PM)
 
+-----------+--------------------------+------------+--------------+
| Component | Value                    | Ref Range  | Performed At |
+-----------+--------------------------+------------+--------------+
| FOLATE    | 8.0Comment: Testing      | >5.4 ng/mL | [facility]   |
|           | performed at [facility], [address] |            | LABORATORY   |
|           |  [facility] [place],        |            |              |
|           | [place]  [address]     |            |              |
+-----------+--------------------------+------------+--------------+
 
 
 
 
+----------+
| Specimen |
+----------+
| Blood    |
+----------+
 
 
 
+---------------+-------------------------+---------------------+----------------+
| Performing    | Address                 | City/State/Zipcode  | Phone Number   |
| Organization  |                         |                     |                |
+---------------+-------------------------+---------------------+----------------+
|   [facility]  |   [address] [place] [address]  | [place] [address] |   [phone] |
| LABORATORY    | [address]                   |                     |                |
+---------------+-------------------------+---------------------+----------------+
 Vitamin B12 (2019  5:39 PM)
 
+-------------+--------------------------+-------------------+--------------+
| Component   | Value                    | Ref Range         | Performed At |
+-------------+--------------------------+-------------------+--------------+
| VITAMIN B12 | 553Comment: Testing      | 254 - 1,320 pg/mL | [facility]   |
|             | performed at [facility], [address] |                   | LABORATORY   |
|             |  [address],        |                   |              |
|             | [place]  [address]     |                   |              |
+-------------+--------------------------+-------------------+--------------+
 
 
 
+----------+
| Specimen |
+----------+
| Blood    |
+----------+
 
 
 
+---------------+-------------------------+---------------------+----------------+
| Performing    | Address                 | City/State/Zipcode  | Phone Number   |
| Organization  |                         |                     |                |
+---------------+-------------------------+---------------------+----------------+
|   [facility]  |   [address] [facility]  | [place] [address] |   [phone] |
| LABORATORY    | [address]                   |                     |                |
+---------------+-------------------------+---------------------+----------------+
 Blood Culture Set 2 (2019  5:39 PM)
 
+----------------------+------------------------+-----------+-----------------+
| Component            | Value                  | Ref Range | Performed At    |
+----------------------+------------------------+-----------+-----------------+
| Specimen Description | BLOOD, PERIPHERAL DRAW |           | [facility]      |
|                      |                        |           | LABORATORY      |
+----------------------+------------------------+-----------+-----------------+
| SPECIAL REQUESTS     | R HAND                 |           | [facility] |
+----------------------+------------------------+-----------+-----------------+
| CULTURE              | NO GROWTH 6 DAYS       |           | [facility]      |
|                      |                        |           | LABORATORY      |
+----------------------+------------------------+-----------+-----------------+
 
 
 
 
+-----------------+
| Specimen        |
+-----------------+
| Blood - Blood,  |
| peripheral draw |
+-----------------+
 
 
 
+-------------------+-------------------------+---------------------+----------------+
| Performing        | Address                 | City/State/Zipcode  | Phone Number   |
| Organization      |                         |                     |                |
+-------------------+-------------------------+---------------------+----------------+
|   [facility]      |   [address]  | [place] [address] |   [phone] |
| LABORATORY        | [address]                   |                     |                |
+-------------------+-------------------------+---------------------+----------------+
|   [facility] |   [address]        | [place] [address]  |                |
+-------------------+-------------------------+---------------------+----------------+
 Sedimentation Rate (ESR) (2019  5:39 PM)
 
+-----------+-------------------------+--------------+-----------------+
| Component | Value                   | Ref Range    | Performed At    |
+-----------+-------------------------+--------------+-----------------+
| ESR       | 13Comment: Testing      | 0 - 30 mm/Hr | [facility] |
|           | performed at [facility] [place]    |              |                 |
|           | [address];[place]  |              |                 |
|           | [address]                   |              |                 |
+-----------+-------------------------+--------------+-----------------+
 
 
 
+----------+
| Specimen |
+----------+
| Blood    |
+----------+
 
 
 
+-------------------+------------------+--------------------+--------------+
| Performing        | Address          | City/State/Zipcode | Phone Number |
| Organization      |                  |                    |              |
+-------------------+------------------+--------------------+--------------+
|   [facility] |   [address] | [place] [address] |              |
+-------------------+------------------+--------------------+--------------+
 Comprehensive metabolic panel (2019  5:39 PM)
 
+----------------+--------------------------+-------------------+-----------------+
| Component      | Value                    | Ref Range         | Performed At    |
+----------------+--------------------------+-------------------+-----------------+
| SODIUM         | 143                      | 135 - 145 mmol/L  | [facility] |
+----------------+--------------------------+-------------------+-----------------+
| POTASSIUM      | 4.7                      | 3.5 - 4.9 mmol/L  | [facility] |
+----------------+--------------------------+-------------------+-----------------+
| CHLORIDE       | 96 (L)                   | 99 - 109 mmol/L   | [facility] |
+----------------+--------------------------+-------------------+-----------------+
| CO2            | >40 (HH)Comment: CALLED  | 23 - 32 mmol/L    | [facility] |
|                | NURSING UNITREAD BACK    |                   |                 |
|                | RESULTS VERIFIEDCALLED   |                   |                 |
 
|                | TO RN IN ED AT 1830 BY   |                   |                 |
|                | LGJ                      |                   |                 |
|                |                          |                   |                 |
+----------------+--------------------------+-------------------+-----------------+
| ANION GAP AGAP | UNABLE TO CALCULATE      | 5 - 20 mmol/L     | [facility] |
+----------------+--------------------------+-------------------+-----------------+
| GLUCOSE        | 160 (H)                  | 65 - 99 mg/dL     | [facility] |
+----------------+--------------------------+-------------------+-----------------+
| BUN            | 9                        | 8 - 25 mg/dL      | [facility] |
+----------------+--------------------------+-------------------+-----------------+
| CREATININE     | 2.96 (H)                 | 0.50 - 1.00 mg/dL | [facility] |
+----------------+--------------------------+-------------------+-----------------+
| BUN/CREAT      | 3                        |                   | [facility] |
+----------------+--------------------------+-------------------+-----------------+
| CALCIUM        | 9.4                      | 8.5 - 10.5 mg/dL  | [facility] |
+----------------+--------------------------+-------------------+-----------------+
| TOTAL PROTEIN  | 6.7                      | 6.3 - 8.2 g/dL    | [facility] |
+----------------+--------------------------+-------------------+-----------------+
| Albumin        | 4.3                      | 3.3 - 4.8 g/dL    | [facility] |
+----------------+--------------------------+-------------------+-----------------+
| GLOBULIN       | 2.4                      | 1.3 - 4.9 g/dL    | [facility] |
+----------------+--------------------------+-------------------+-----------------+
| A/G            | 1.8                      | 1.0 - 2.4         | [facility] |
+----------------+--------------------------+-------------------+-----------------+
| TBIL           | 0.5                      | 0.1 - 1.5 mg/dL   | [facility] |
+----------------+--------------------------+-------------------+-----------------+
| ALK PHOS       | 103                      | 35 - 115 U/L      | [facility] |
+----------------+--------------------------+-------------------+-----------------+
| AST            | 54 (H)                   | 10 - 45 U/L       | [facility] |
+----------------+--------------------------+-------------------+-----------------+
| ALT            | 40                       | 10 - 65 U/L       | [facility] |
+----------------+--------------------------+-------------------+-----------------+
| EGFR           | 16 (L)Comment: GFR <60:  | >60 mL/min/1.73m2 | [facility] |
|                | CHRONIC KIDNEY DISEASE,  |                   |                 |
|                | IF FOUND OVER A 3 MONTH  |                   |                 |
|                | PERIOD.GFR <15: KIDNEY   |                   |                 |
|                | FAILURE.FOR       |                   |                 |
|                | AMERIC[employer], MULTIPLY THE  |                   |                 |
|                | CALCULATED GFR BY        |                   |                 |
|                | 1.210.This eGFR is       |                   |                 |
|                | calculated using the     |                   |                 |
|                | MDRD IDMS traceable      |                   |                 |
|                | equation.Testing         |                   |                 |
|                | performed at [facility] [place]     |                   |                 |
|                | [facility];[place], [place]   |                   |                 |
|                | [address]                    |                   |                 |
+----------------+--------------------------+-------------------+-----------------+
 
 
 
+----------+
| Specimen |
+----------+
| Blood    |
+----------+
 
 
 
+-------------------+------------------+--------------------+--------------+
| Performing        | Address          | City/State/Zipcode | Phone Number |
 
| Organization      |                  |                    |              |
+-------------------+------------------+--------------------+--------------+
|   [facility] |   [address] | [place] [address] |              |
+-------------------+------------------+--------------------+--------------+
 CBC with differential (2019  5:39 PM)
 
+-------------------+------------------------+-------------------+-----------------+
| Component         | Value                  | Ref Range         | Performed At    |
+-------------------+------------------------+-------------------+-----------------+
| WBC               | 5.14                   | 3.80 - 11.00 K/uL | [facility] |
+-------------------+------------------------+-------------------+-----------------+
| RBC               | 2.91 (L)               | 3.70 - 5.10 M/uL  | [facility] |
+-------------------+------------------------+-------------------+-----------------+
| HGB               | 10.1 (L)               | 11.3 - 15.5 g/dL  | [facility] |
+-------------------+------------------------+-------------------+-----------------+
| HCT               | 30.9 (L)               | 34.0 - 46.0 %     | [facility] |
+-------------------+------------------------+-------------------+-----------------+
| MCV               | 106.1 (H)              | 80.0 - 100.0 fl   | [facility] |
+-------------------+------------------------+-------------------+-----------------+
| MCH               | 34.8 (H)               | 27.0 - 34.0 pg    | [facility] |
+-------------------+------------------------+-------------------+-----------------+
| MCHC              | 32.8                   | 32.0 - 35.5 g/dL  | [facility] |
+-------------------+------------------------+-------------------+-----------------+
| RDW SD            | 61.3 (H)               | 37 - 53 fl        | [facility] |
+-------------------+------------------------+-------------------+-----------------+
| PLT               | 145 (L)                | 150 - 400 K/uL    | [facility] |
+-------------------+------------------------+-------------------+-----------------+
| MPV               | 9.3                    | fl                | [facility] |
+-------------------+------------------------+-------------------+-----------------+
| DIFF TYPE         | AUTOMATED              |                   | [facility] |
+-------------------+------------------------+-------------------+-----------------+
| NEUTROPHILS       | 74.03                  | %                 | [facility] |
+-------------------+------------------------+-------------------+-----------------+
| LYMPHOCYTES       | 20.20                  | %                 | [facility] |
+-------------------+------------------------+-------------------+-----------------+
| MONOCYTES         | 5.16                   | %                 | [facility] |
+-------------------+------------------------+-------------------+-----------------+
| EOSINOPHILS       | 0.03                   | %                 | [facility] |
+-------------------+------------------------+-------------------+-----------------+
| BASOPHILS         | 0.58                   | %                 | [facility] |
+-------------------+------------------------+-------------------+-----------------+
| NEUTROPHILS ABS   | 3.81                   | 1.90 - 7.40 K/uL  | [facility] |
+-------------------+------------------------+-------------------+-----------------+
| LYMPHOCYTES ABS   | 1.04                   | 1.00 - 3.90 K/uL  | [facility] |
+-------------------+------------------------+-------------------+-----------------+
| MONOCYTES ABS     | 0.27                   | 0.00 - 0.80 K/uL  | [facility] |
+-------------------+------------------------+-------------------+-----------------+
| EOSINOPHILS ABS   | 0.00                   | 0.00 - 0.50 K/uL  | [facility] |
+-------------------+------------------------+-------------------+-----------------+
| BASOPHILS ABS     | 0.03                   | 0.00 - 0.10 K/uL  | [facility] |
+-------------------+------------------------+-------------------+-----------------+
| MORPHOLOGY        | 1+                     |                   | [facility] |
|                   | Comment:               |                   |                 |
|                   | ANISO                  |                   |                 |
|                   | 1+                     |                   |                 |
|                   | MACRO                  |                   |                 |
|                   | NORMAL PLT MORPH       |                   |                 |
|                   |                        |                   |                 |
+-------------------+------------------------+-------------------+-----------------+
| Platelet Estimate | DECREASEDComment:      |                   | [facility] |
 
|                   | Testing performed at   |                   |                 |
|                   | [facility] [place] [place] [address]          |                   |                 |
|                   | [place] [address] |                   |                 |
+-------------------+------------------------+-------------------+-----------------+
 
 
 
+----------+
| Specimen |
+----------+
| Blood    |
+----------+
 
 
 
+-------------------+------------------+--------------------+--------------+
| Performing        | Address          | City/State/Zipcode | Phone Number |
| Organization      |                  |                    |              |
+-------------------+------------------+--------------------+--------------+
|   [facility] |   [address] | [place] [address] |              |
+-------------------+------------------+--------------------+--------------+
 C-Reactive Protein (2019  5:39 PM)
 
+-----------+--------------------------+------------+-----------------+
| Component | Value                    | Ref Range  | Performed At    |
+-----------+--------------------------+------------+-----------------+
| CRP       | 0.8 (H)Comment: Testing  | <0.5 mg/dL | [facility] |
|           | [place] [facility] [place]     |            |                 |
|           | [address];[place]   |            |                 |
|           | [id number]                    |            |                 |
+-----------+--------------------------+------------+-----------------+
 
 
 
+----------+
| Specimen |
+----------+
| Blood    |
+----------+
 
 
 
+-------------------+------------------+--------------------+--------------+
| Performing        | Address          | City/State/Zipcode | Phone Number |
| Organization      |                  |                    |              |
+-------------------+------------------+--------------------+--------------+
|   [facility] |   [address] | [place] [address] |              |
+-------------------+------------------+--------------------+--------------+
 XR Toe Right 2 View (2019  5:08 PM)
 
+----------------------------------------------------------------------+--------------+
| Impressions                                                          | Performed At |
+----------------------------------------------------------------------+--------------+
|   No radiographic evidence of osteomyelitis seen.    If further      |   [facility]     |
| assessment  is warranted, consider correlation with MRI.  Potential  | RADIOLOGY    |
| acute fracture through the base of the distal phalanx.  Signed by:   |              |
| Gavin South  Sign Date/Time: 2019 5:15 PM                      |              |
+----------------------------------------------------------------------+--------------+
 
 
 
 
+------------------------------------------------------------------------+--------------+
| Narrative                                                              | Performed At |
+------------------------------------------------------------------------+--------------+
|   RIGHT TOE  CLINICAL INFORMATION:  Other (see comments) Pain, concern |   [facility]     |
|  for osteomyelitis.  COMPARISON:  XR FOOT LEFT (2018); XR FOOT   | RADIOLOGY    |
| LEFT (2018); XR FOOT LEFT  (2018);  FINDINGS:  Advanced     |              |
| degenerative changes identified involving the interphalangeal  joint   |              |
| of the 1st digit.    On the lateral view, there appears to be  lucency |              |
|  through the base of the phalanx, potentially reflecting an  acute     |              |
| fracture.    No erosive or destructive changes appreciated to  suggest |              |
|  osteomyelitis.                                                        |              |
+------------------------------------------------------------------------+--------------+
 
 
 
+------------------------------------------------------------------------+
| Procedure Note                                                         |
+------------------------------------------------------------------------+
|   Denny, Rad Results In - 2019  5:18 PM PST  RIGHT TOE             |
| CLINICAL INFORMATION:                                                  |
| Other (see comments) Pain, concern for osteomyelitis.                  |
| COMPARISON:                                                            |
| XR FOOT LEFT (2018); XR FOOT LEFT (2018); XR FOOT LEFT     |
| (2018);                                                           |
| FINDINGS:                                                              |
| Advanced degenerative changes identified involving the interphalangeal |
| joint of the 1st digit.  On the lateral view, there appears to be      |
| lucency through the base of the phalanx, potentially reflecting an     |
| acute fracture.  No erosive or destructive changes appreciated to      |
| suggest osteomyelitis.                                                 |
| IMPRESSION:                                                            |
| No radiographic evidence of osteomyelitis seen.  If further assessment |
| is warranted, consider correlation with MRI.                           |
| Potential acute fracture through the base of the distal phalanx.       |
| Signed by: Gavin South                                                 |
| Sign Date/Time: 2019 5:15 PM                                     |
+------------------------------------------------------------------------+
 
 
 
+--------------------+------------------+--------------------+--------------+
| Performing         | Address          | City/State/Zipcode | Phone Number |
| Organization       |                  |                    |              |
+--------------------+------------------+--------------------+--------------+
|   [facility] |   [address] | [place] [address] |              |
+--------------------+------------------+--------------------+--------------+
 Septic Lactic Acid (2019  4:55 PM)
 
+-------------+--------------------------+------------------+-----------------+
| Component   | Value                    | Ref Range        | Performed At    |
+-------------+--------------------------+------------------+-----------------+
| LACTIC ACID | 2.7 (H)Comment: Testing  | 0.4 - 2.0 mmol/L | [facility] |
|             | [place] [facility] [place]     |                  |                 |
|             | [address];[place]   |                  |                 |
|             | [id number]                    |                  |                 |
+-------------+--------------------------+------------------+-----------------+
 
 
 
 
+-------------------+------------------+--------------------+--------------+
| Performing        | Address          | City/State/Zipcode | Phone Number |
| Organization      |                  |                    |              |
+-------------------+------------------+--------------------+--------------+
|   [facility] |   [address] | [place] [address] |              |
+-------------------+------------------+--------------------+--------------+
 Blood Culture Set 1 (2019  4:55 PM)
 
+----------------------+------------------+-----------+-----------------+
| Component            | Value            | Ref Range | Performed At    |
+----------------------+------------------+-----------+-----------------+
| Specimen Description | BLOOD, LINE DRAW |           | [facility]      |
|                      |                  |           | LABORATORY      |
+----------------------+------------------+-----------+-----------------+
| SPECIAL REQUESTS     | R FOREARM        |           | [facility] |
+----------------------+------------------+-----------+-----------------+
| CULTURE              | NO GROWTH 6 DAYS |           | [facility]      |
|                      |                  |           | LABORATORY      |
+----------------------+------------------+-----------+-----------------+
 
 
 
+---------------------+
| Specimen            |
+---------------------+
| Blood - Blood Line  |
| Draw                |
+---------------------+
 
 
 
+-------------------+-------------------------+---------------------+----------------+
| Performing        | Address                 | City/State/Zipcode  | Phone Number   |
| Organization      |                         |                     |                |
+-------------------+-------------------------+---------------------+----------------+
|   [facility]      |   [address]  | [place] [address] |   [phone] |
| LABORATORY        | [place]                   |                     |                |
+-------------------+-------------------------+---------------------+----------------+
|   [facility] |   [address]        | [place] [address]  |                |
+-------------------+-------------------------+---------------------+----------------+
 ED INFORMATION EXCHANGE (2019  3:21 PM)
 
+------------------------------------------------------------------------+--------------+
| Narrative                                                              | Performed At |
+------------------------------------------------------------------------+--------------+
|   [id number]     Criteria Met         Care        |   ED         |
| Guidelines      10 in 12     Security and Safety  No recent Security   | INFORMATION  |
| Events currently on file     ED Care Guidelines from [employer] -        | EXCHANGE     |
| [place]  Last Updated: 18 10:16 AM         Other Information:    |              |
| Currently being seen by [facility] in [place] for mental health        |              |
| concerns.[phone]  These are guidelines and the provider should    |              |
| exercise clinical judgment when providing care.  ED Care Guidelines    |              |
| from [facility]  Last Updated: 17 10:44 AM         Care |              |
|  Coordination:  This patient has been identified as having at          |              |
| leastEmergency Departments visits in the 12 months immediately         |              |
| preceding the date these guidelines were entered.Patient requires      |              |
| education on appropriate ED usage.Emphasize the importance of using    |              |
| outpatient   medical services for the treatment of chronic conditions. |              |
|   Please contact Community Health WorkerWillard at [phone] if   |              |
| patient is seen in ED.  These are guidelines and the provider should   |              |
 
| exercise clinical judgment when providing care.  These are guidelines  |              |
| and the provider should exercise clinical judgment when providing      |              |
| care.           Prescription Drug Report (12 Mo.)  PDMP query found no |              |
|  report.        E.D. Visit Count (12 mo.)  Facility Visits Low Acuity  |              |
|   [facility] 18 0   [facility] 2 0   [facility]   |              |
| [facility] 5 0   Total 25 0   Note: Visits indicate total |              |
|  known visits. Medicaid Low Acuity Dx are the number of primary        |              |
| diagnoses on the Medicaid's Low Acuity dx list.         Recent         |              |
| Emergency Department Visit Summary  Showing 10 most recent visits out  |              |
| of 25 in the past 12 months  Date Facility City State Type Diagnoses   |              |
| or Chief Complaint   2019 [facility]       |              |
| Emergency       2019 [facility]            |              |
| Emergency          Multiple Falls        Referral        Circulatory   |              |
| Problem      2019 [facility] [place] Emergency      |              |
|      ABD PAIN        Other chest pain      2019 [facility]         |              |
| [facility] Emergency          Foot Pain      2019 |              |
|  [facility] Emergency          Chest Pain          |              |
| Nausea        Shortness of Breath        Chest pain, unspecified       |              |
|      Elevated blood-pressure reading, without diagnosis of             |              |
| hypertension        Presence of aortocoronary bypass graft             |              |
| Other specified postprocedural states      2019 [facility]  |              |
| [facility] [place] Emergency          CHEST PAIN        Abnormal levels  |              |
| of other serum enzymes        Personal history of other diseases of    |              |
| the circulatory system        Chest pain, unspecified      2019 |              |
|  [facility] [place] Emergency          FISTULA BLEEDING    |              |
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
| grafts, initial encounter      Dec 22, 2018 [facility]       |              |
| [place] Emergency          CHEST PAIN        Type 2 diabetes         |              |
| mellitus with hyperglycemia        Chest pain, unspecified      Nov    |              |
| 2018 [facility] [place] Emergency    Chief Complaint:   |              |
| SOB      2018 [facility] [place] Emergency         |              |
|     FALL        Unspecified fall, initial encounter        Dependence  |              |
| on renal dialysis        End stage renal disease            Recent     |              |
| Inpatient Visit Summary  Showing 10 most recent visits out of 11 in    |              |
| the past 12 months  Date Facility City State Type Diagnoses or Chief   |              |
| Complaint   2019 [facility] Vascular       |              |
| Surgery          Foot Pain        End stage renal disease              |              |
| Dependence on renal dialysis        Peripheral vascular disease,       |              |
| unspecified        Anemia in chronic kidney disease        Other       |              |
| disorders of plasma-protein metabolism, not elsewhere classified       |              |
|      Other specified personal risk factors, not elsewhere classified   |              |
|     Dec 27, 2018 [facility] Recovery               |              |
|     Peripheral arterial disease, osteomyelitis left foot        Other  |              |
| acute osteomyelitis, left ankle and foot        Long term (current)    |              |
| use of antibiotics        End stage renal disease        Anemia in     |              |
| chronic kidney disease      Dec 5, 2018 [facility] |              |
|  General Medicine          Peripheral vascular disease, unspecified    |              |
|      End stage renal disease        Dependence on renal dialysis       |              |
|      Long term (current) use of insulin        Other chronic           |              |
| osteomyelitis, left ankle and foot        Type 2 diabetes mellitus     |              |
| with diabetic chronic kidney disease        Essential (primary)        |              |
| hypertension      2018 [facility]          |              |
| Inpatient          Upper GIB        Other chronic osteomyelitis,       |              |
| unspecified ankle and foot        End stage renal disease        Other |              |
|  specified personal risk factors, not elsewhere classified             |              |
| Chronic systolic (congestive) heart failure        Anemia in chronic   |              |
| kidney disease        Other disorders of plasma-protein metabolism,    |              |
| not elsewhere classified        Moderate protein-calorie malnutrition  |              |
|        Other disorders of phosphorus metabolism      2018      |              |
| [facility] [place] [place] [facility]          1. Type 2      |              |
 
| diabetes mellitus with other specified complication        2. Urinary  |              |
| tract infection, site not specified        3. Unspecified              |              |
| protein-calorie malnutrition        4. Other chronic osteomyelitis,    |              |
| unspecified site        5. Hypertensive heart and chronic kidney       |              |
| disease with heart failure and with stage 5 chronic kidney disease, or |              |
|  end stage renal disease        6. Chronic diastolic (congestive)      |              |
| heart failure        7. Other osteomyelitis, ankle and foot        8.  |              |
| Type 2 diabetes mellitus with foot ulcer        9. Atherosclerotic     |              |
| heart disease of native coronary artery without angina pectoris        |              |
|  10. Chronic obstructive pulmonary disease, unspecified      ,    |              |
|  [facility] [place] [place] [facility]          1. Benign |              |
|  paroxysmal vertigo, unspecified ear        2. End stage renal disease |              |
|         3. Hypertensive chronic kidney disease with stage 5 chronic    |              |
| kidney disease or end stage renal disease        4. Urinary tract      |              |
| infection, site not specified        5. Hypertensive heart and chronic |              |
|  kidney disease with heart failure and with stage 5 chronic kidney     |              |
| disease, or end stage renal disease        6. Kidney transplant status |              |
|         7. Unspecified systolic (congestive) heart failure        8.   |              |
| Syncope and collapse        9. Type 2 diabetes mellitus with diabetic  |              |
| chronic kidney disease        10. Atherosclerotic heart disease of     |              |
| native coronary artery without angina pectoris      Sep 15, 2018       |              |
| [facility] [place] [facility]          Acute     |              |
| ischemic heart disease, unspecified        Long term (current) use of  |              |
| insulin        Dependence on renal dialysis        End stage renal     |              |
| disease        Type 2 diabetes mellitus with diabetic chronic kidney   |              |
| disease        Essential (primary) hypertension        Anemia in       |              |
| chronic kidney disease      2018 [facility] |              |
|  Medical Surgical          0. Cough        1. Pneumonia due to         |              |
| Pseudomonas        2. End stage renal disease        3. Hypertensive   |              |
| heart and chronic kidney disease with heart failure and with stage 5   |              |
| chronic kidney disease, or end stage renal disease        4. Cachexia  |              |
|        5. Chronic obstructive pulmonary disease with acute lower       |              |
| respiratory infection        6. Type 2 diabetes mellitus with diabetic |              |
|  chronic kidney disease        7. Heart failure, unspecified        8. |              |
|  Hyperlipidemia, unspecified        9. Gastro-esophageal reflux        |              |
| disease without esophagitis      2018 [facility]       |              |
| [facility]. [place] [facility]          0. Other chest pain        1.      |              |
| Gastro-esophageal reflux disease without esophagitis        2. End     |              |
| stage renal disease        3. Hypertensive heart and chronic kidney    |              |
| disease with heart failure and with stage 5 chronic kidney disease, or |              |
|  end stage renal disease        4. Chronic systolic (congestive) heart |              |
|  failure        5. Atherosclerotic heart disease of native coronary    |              |
| artery with other forms of angina pectoris        6. Type 2 diabetes   |              |
| mellitus with diabetic chronic kidney disease        7.                |              |
| Hyperlipidemia, unspecified        8. Major depressive disorder,       |              |
| single episode, unspecified        9. Chronic obstructive pulmonary    |              |
| disease, unspecified      Mar 6, 2018 [facility]     |              |
| [facility]. [place] [facility]          Heart Failure        Need for iv access  |              |
|        Type 2 diabetes mellitus with other specified complication      |              |
|      Long term (current) use of insulin        Paroxysmal atrial       |              |
| fibrillation        Anemia in chronic kidney disease                   |              |
| Atherosclerotic heart disease of native coronary artery without angina |              |
|  pectoris        Cardiomyopathy, unspecified        End stage renal    |              |
| disease        Other specified postprocedural states            Care   |              |
| Providers  Provider PRC Type Phone Fax Service Dates   HEADINGS, SANTIAGO, |              |
|  F.N.P. Nurse Practitioner: Family     Current      Marco Antonio Martinez     |              |
| /Care Coordinator [phone]    2019 -          |              |
| Current      [facility] [phone]    2019 |              |
|  - Current      [facility] Primary            |              |
| Care    [phone] Current      [facility]      |              |
 
| [place] [facility]     Current      MANOLO GONZALEZ Primary Care         |              |
| Current      [facility] Provider [phone] |              |
|  [phone] Current      [facility] Case or Care Manager     Current      |              |
|  MIRTA Goel Case or Care Manager [phone]  |              |
| [phone] Current      Jairo Gutierrez MD Other     Current     |              |
|      [email or web address]  This patient has registered at the [facility]      |              |
| [facility] Emergency Department   For more information    |              |
| visit:                                                                 |              |
| [email or web address] |              |
| [id number]   The above information is provided for the sole purpose of     |              |
| patient treatment. Use of this information beyond the terms of Data    |              |
| Sharing Memorandum of Understanding and License Agreement is           |              |
| prohibited. In certain cases not all visits may be represented.        |              |
| Consult the aforementioned facilities for additional information.      |              |
| 2019 [facility]. - [place], [place] -      |              |
| [email or web address]                                         |              |
+------------------------------------------------------------------------+--------------+
 
 
 
+-------------------------------------------------------------------------------------------
--------------------------------------------------------------------------------------------
--------------------+
| Procedure Note                                                                            
                                                                                            
                    |
+-------------------------------------------------------------------------------------------
--------------------------------------------------------------------------------------------
--------------------+
|   Interface, Lab - 2019  3:22 PM PST  Formatting of this note may be different      
                                                                                            
                    |
| from the original.[id number] [id number]  Care Guidelines  10 in     
                                                                                            
                    |
| 12Security and SafetyNo recent Security Events currently on fileED Care Guidelines from   
                                                                                            
                    |
| [facility] Updated: 18 10:16 AM  Other Information:Currently being      
                                                                                            
                    |
| seen by [facility] in [place] for mental health concerns.[phone] are            
                                                                                            
                    |
| guidelines and the provider should exercise clinical judgment when providing care.ED      
                                                                                            
                    |
| Care Guidelines [facility] Updated: 17 10:44 AM  Care             
                                                                                            
                    |
| Coordination:This patient has been identified as having at leastEmergency Departments     
                                                                                            
                    |
| visits in the 12 months immediately preceding the date these guidelines were              
                                                                                            
                    |
| entered.Patient requires education on appropriate ED usage.Emphasize the importance of    
                                                                                            
                    |
| using outpatient medical services for the treatment of chronic conditions.Please contact  
 
                                                                                            
                    |
|  Community Health WorkerWillard at [phone] if patient is seen in ED.These are      
                                                                                            
                    |
| guidelines and the provider should exercise clinical judgment when providing care.These   
                                                                                            
                    |
| are guidelines and the provider should exercise clinical judgment when providing          
                                                                                            
                    |
| care.Prescription Drug Report (12 Mo.)PDMP query found no report.E.D. Visit Count (12     
                                                                                            
                    |
| mo.)Facility Visits Low Acuity [facility] 18 0 [facility] 2 0    
                                                                                            
                    |
| [facility] 5 0 Total 25 0 Note: Visits indicate total known visits.   
                                                                                            
                    |
| Medicaid Low Acuity Dx are the number of primary diagnoses on the Medicaid's Low Acuity   
                                                                                            
                    |
| dx list.  Recent Emergency Department Visit SummaryShowing 10 most recent visits out of   
                                                                                            
                    |
| 25 in the past 12 monthsDate Facility City State Type Diagnoses or Chief Complaint Feb    
                                                                                            
                    |
| 2019 [facility] [place] Emergency   2019 [facility] [place]     
                                                                                            
                    |
| [facility] Emergency    Multiple Falls    Referral    Circulatory Problem  2019     
                                                                                            
                    |
| [facility] [place] Emergency    ABD PAIN    Other chest pain  2019    
                                                                                            
                    |
| [place] Emergency    Foot Pain  2019 [facility] [place]  
                                                                                            
                    |
|  [facility] Emergency    Chest Pain    Nausea    Shortness of Breath    Chest pain,        
                                                                                            
                    |
| unspecified    Elevated blood-pressure reading, without diagnosis of hypertension         
                                                                                            
                    |
| Presence of aortocoronary bypass graft    Other specified postprocedural states  ,  
                                                                                            
                    |
|   [facility] [place] Emergency    CHEST PAIN    Abnormal levels of other  
                                                                                            
                    |
|  serum enzymes    Personal history of other diseases of the circulatory system    Chest   
                                                                                            
                    |
| pain, unspecified  2019 [facility] [place] Emergency    FISTULA        
                                                                                            
                    |
| BLEEDING    Hemorrhage due to vascular prosthetic devices, implants and grafts, initial   
 
                                                                                            
                    |
| encounter  Dec 22, 2018 [facility] [place] Emergency    CHEST PAIN    Type 2  
                                                                                            
                    |
|  diabetes mellitus with hyperglycemia    Chest pain, unspecified  2018 [facility]      
                                                                                            
                    |
| [place] Emergency  Chief Complaint: SOB  2018 [facility]       
                                                                                            
                    |
| [facility] [place] Emergency    FALL    Unspecified fall, initial encounter    Dependence   
                                                                                            
                    |
| on renal dialysis    End stage renal disease  Recent Inpatient Visit SummaryShowing 10    
                                                                                            
                    |
| most recent visits out of 11 in the past 12 monthsDate Facility City State Type           
                                                                                            
                    |
| Diagnoses or Chief Complaint 2019 [facility] [place] Vascular         
                                                                                            
                    |
| Surgery    Foot Pain    End stage renal disease    Dependence on renal dialysis           
                                                                                            
                    |
| Peripheral vascular disease, unspecified    Anemia in chronic kidney disease    Other     
                                                                                            
                    |
| disorders of plasma-protein metabolism, not elsewhere classified    Other specified       
                                                                                            
                    |
| personal risk factors, not elsewhere classified  Dec 27, 2018 [facility] [place]        
                                                                                            
                    |
| [place] Recovery    Peripheral arterial disease, osteomyelitis left foot    Other       
                                                                                            
                    |
| acute osteomyelitis, left ankle and foot    Long term (current) use of antibiotics        
                                                                                            
                    |
| End stage renal disease    Anemia in chronic kidney disease  Dec 5, 2018 [facility]  
                                                                                            
                    |
  [place] General Medicine    Peripheral vascular disease, unspecified    End       
                                                                                            
                    |
| stage renal disease    Dependence on renal dialysis    Long term (current) use of         
                                                                                            
                    |
| insulin    Other chronic osteomyelitis, left ankle and foot    Type 2 diabetes mellitus   
                                                                                            
                    |
| with diabetic chronic kidney disease    Essential (primary) hypertension  2018    
                                                                                            
                    |
| [facility] [place] Inpatient    Upper GIB    Other chronic osteomyelitis,     
                                                                                            
                    |
| unspecified ankle and foot    End stage renal disease    Other specified personal risk    
 
                                                                                            
                    |
| factors, not elsewhere classified    Chronic systolic (congestive) heart failure          
                                                                                            
                    |
| Anemia in chronic kidney disease    Other disorders of plasma-protein metabolism, not     
                                                                                            
                    |
| elsewhere classified    Moderate protein-calorie malnutrition    Other disorders of       
                                                                                            
                    |
| phosphorus metabolism  2018 [place] Medical Surgical    1.  
                                                                                            
                    |
|  Type 2 diabetes mellitus with other specified complication    2. Urinary tract           
                                                                                            
                    |
| infection, site not specified    3. Unspecified protein-calorie malnutrition    4. Other  
                                                                                            
                    |
|  chronic osteomyelitis, unspecified site    5. Hypertensive heart and chronic kidney      
                                                                                            
                    |
| disease with heart failure and with stage 5 chronic kidney disease, or end stage renal    
                                                                                            
                    |
| disease    6. Chronic diastolic (congestive) heart failure    7. Other osteomyelitis,     
                                                                                            
                    |
| ankle and foot    8. Type 2 diabetes mellitus with foot ulcer    9. Atherosclerotic       
                                                                                            
                    |
| heart disease of native coronary artery without angina pectoris    10. Chronic            
                                                                                            
                    |
| obstructive pulmonary disease, unspecified  [facility] [place] [place]     
                                                                                            
                    |
[facility]    1. Benign paroxysmal vertigo, unspecified ear    2. End stage renal   
                                                                                            
                    |
| disease    3. Hypertensive chronic kidney disease with stage 5 chronic kidney disease or  
                                                                                            
                    |
|  end stage renal disease    4. Urinary tract infection, site not specified    5.          
                                                                                            
                    |
| Hypertensive heart and chronic kidney disease with heart failure and with stage 5         
                                                                                            
                    |
| chronic kidney disease, or end stage renal disease    6. Kidney transplant status    7.   
                                                                                            
                    |
| Unspecified systolic (congestive) heart failure    8. Syncope and collapse    9. Type 2   
                                                                                            
                    |
| diabetes mellitus with diabetic chronic kidney disease    10. Atherosclerotic heart       
                                                                                            
                    |
| disease of native coronary artery without angina pectoris  Sep 15, 2018 [facility]    
 
                                                                                            
                    |
| [place] Medical Surgical    Acute ischemic heart disease, unspecified         
                                                                                            
                    |
| Long term (current) use of insulin    Dependence on renal dialysis    End stage renal     
                                                                                            
                    |
| disease    Type 2 diabetes mellitus with diabetic chronic kidney disease    Essential     
                                                                                            
                    |
| (primary) hypertension    Anemia in chronic kidney disease  2018 [facility]            
                                                                                            
                    |
| [place] Medical Surgical    0. Cough    1. Pneumonia due to Pseudomonas   
                                                                                            
                    |
|    2. End stage renal disease    3. Hypertensive heart and chronic kidney disease with    
                                                                                            
                    |
| heart failure and with stage 5 chronic kidney disease, or end stage renal disease    4.   
                                                                                            
                    |
| Cachexia    5. Chronic obstructive pulmonary disease with acute lower respiratory         
                                                                                            
                    |
| infection    6. Type 2 diabetes mellitus with diabetic chronic kidney disease    7.       
                                                                                            
                    |
| Heart failure, unspecified    8. Hyperlipidemia, unspecified    9. Gastro-esophageal      
                                                                                            
                    |
| reflux disease without esophagitis  2018 [facility] [place] Medical     
                                                                                            
                    |
| Surgical    0. Other chest pain    1. Gastro-esophageal reflux disease without            
                                                                                            
                    |
| esophagitis    2. End stage renal disease    3. Hypertensive heart and chronic kidney     
                                                                                            
                    |
| disease with heart failure and with stage 5 chronic kidney disease, or end stage renal    
                                                                                            
                    |
| disease    4. Chronic systolic (congestive) heart failure    5. Atherosclerotic heart     
                                                                                            
                    |
| disease of native coronary artery with other forms of angina pectoris    6. Type 2        
                                                                                            
                    |
| diabetes mellitus with diabetic chronic kidney disease    7. Hyperlipidemia, unspecified  
                                                                                            
                    |
|     8. Major depressive disorder, single episode, unspecified    9. Chronic obstructive   
                                                                                            
                    |
| pulmonary disease, unspecified  Mar 6, 2018 [facility]        
                                                                                            
                    |
| Cardiology    Heart Failure    Need for iv access    Type 2 diabetes mellitus with other  
 
                                                                                            
                    |
|  specified complication    Long term (current) use of insulin    Paroxysmal atrial        
                                                                                            
                    |
| fibrillation    Anemia in chronic kidney disease    Atherosclerotic heart disease of      
                                                                                            
                    |
| native coronary artery without angina pectoris    Cardiomyopathy, unspecified    End      
                                                                                            
                    |
| stage renal disease    Other specified postprocedural states  Care ProvidersProvider [facility]  
                                                                                            
                    |
|  Type Phone Fax Service Dates HEADINGS, CARLOS ALBERTO HENDERSON. Nurse Practitioner: Family           
                                                                                            
                    |
| Current  Marco Antonio Martinez /Care Coordinator [phone]  2019 -       
                                                                                            
                    |
| Current  Marco Antonio Martinez Primary Care [phone]  2019 - Current  [facility]        
                                                                                            
                    |
| [facility] Primary Care  [phone] Current  [facility]  
                                                                                            
                    |
|  [facility] Primary Care   Current  MANOLO GONZALEZ Primary Care   Current  [employer],    
                                                                                            
                    |
| [facility] Mental Health Provider [phone] Current  [employer] Case or Care  
                                                                                            
                    |
|  Manager   Current  MIRTA Goel Case or Care Manager [phone]  
                                                                                            
                    |
|  [phone] Current  Jairo Gutierrez MD Other   Current  [employer]This      
                                                                                            
                    |
| patient has registered at the [facility] Emergency Department For     
                                                                                            
                    |
| more information visit:                                                                   
                                                                                            
                    |
| [email or web address] The above    
                                                                                            
                    |
| information is provided for the sole purpose of patient treatment. Use of this            
                                                                                            
                    |
| information beyond the terms of Data Sharing Memorandum of Understanding and License      
                                                                                            
                    |
| Agreement is prohibited. In certain cases not all visits may be represented. Consult the  
                                                                                            
                    |
|  aforementioned facilities for additional information. 2019 [employer] [facility]            
                                                                                            
                    |
| [email or web address]. [place], [place] - [email or web address]                  
 
                                                                                            
                    |
|   End stage renal disease                                                                 
                                                                                            
                    |
|   Dependence on renal dialysis                                                            
                                                                                            
                    |
|   Long term (current) use of insulin                                                      
                                                                                            
                    |
|   Other chronic osteomyelitis, left ankle and foot                                        
                                                                                            
                    |
|   Type 2 diabetes mellitus with diabetic chronic kidney disease                           
                                                                                            
                    |
|   Essential (primary) hypertension                                                        
                                                                                            
                    |
|                                                                                           
                                                                                            
                    |
|[facility] [place] [place] [facility]                                      
                                                                                            
                    |
|  Upper GIB                                                                                
                                                                                            
                    |
|   Other chronic osteomyelitis, unspecified ankle and foot                                 
                                                                                            
                    |
|   End stage renal disease                                                                 
                                                                                            
                    |
|   Other specified personal risk factors, not elsewhere classified                         
                                                                                            
                    |
|   Chronic systolic (congestive) heart failure                                             
                                                                                            
                    |
|   Anemia in chronic kidney disease                                                        
                                                                                            
                    |
|   Other disorders of plasma-protein metabolism, not elsewhere classified                  
                                                                                            
                    |
|   Moderate protein-calorie malnutrition                                                   
                                                                                            
                    |
|   Other disorders of phosphorus metabolism                                                
                                                                                            
                    |
|                                                                                           
                                                                                            
                    |
|2018 [facility]. [place] [facility]                                
                                                                                            
                    |
|  1. Type 2 diabetes mellitus with other specified complication                            
 
                                                                                            
                    |
|   2. Urinary tract infection, site not specified                                          
                                                                                            
                    |
|   3. Unspecified protein-calorie malnutrition                                             
                                                                                            
                    |
|   4. Other chronic osteomyelitis, unspecified site                                        
                                                                                            
                    |
|   5. 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
onic kidney disease, or end stage renal disease                                             
                    |
|   6. Chronic diastolic (congestive) heart failure                                         
                                                                                            
                    |
|   7. Other osteomyelitis, ankle and foot                                                  
                                                                                            
                    |
|   8. Type 2 diabetes mellitus with foot ulcer                                             
                                                                                            
                    |
|   9. Atherosclerotic heart disease of native coronary artery without angina pectoris      
                                                                                            
                    |
|   10. Chronic obstructive pulmonary disease, unspecified                                  
                                                                                            
                    |
|                                                                                           
                                                                                            
                    |
|2018 [facility]. [place] [facility]                                 
                                                                                            
                    |
|  1. Benign paroxysmal vertigo, unspecified ear                                            
                                                                                            
                    |
|   2. End stage renal disease                                                              
                                                                                            
                    |
|   3. Hypertensive chronic kidney disease with stage 5 chronic kidney disease or end stage 
renal disease                                                                               
                    |
|   4. Urinary tract infection, site not specified                                          
                                                                                            
                    |
|   5. 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
onic kidney disease, or end stage renal disease                                             
                    |
|   6. Kidney transplant status                                                             
                                                                                            
                    |
|   7. Unspecified systolic (congestive) heart failure                                      
                                                                                            
                    |
|   8. Syncope and collapse                                                                 
                                                                                            
                    |
|   9. Type 2 diabetes mellitus with diabetic chronic kidney disease                        
 
                                                                                            
                    |
|   10. Atherosclerotic heart disease of native coronary artery without angina pectoris     
                                                                                            
                    |
|                                                                                           
                                                                                            
                    |
|Sep 15, [address] [facility]. [place] [facility]                           
                                                                                            
                    |
|  Acute ischemic heart disease, unspecified                                                
                                                                                            
                    |
|   Long term (current) use of insulin                                                      
                                                                                            
                    |
|   Dependence on renal dialysis                                                            
                                                                                            
                    |
|   End stage renal disease                                                                 
                                                                                            
                    |
|   Type 2 diabetes mellitus with diabetic chronic kidney disease                           
                                                                                            
                    |
|   Essential (primary) hypertension                                                        
                                                                                            
                    |
|   Anemia in chronic kidney disease                                                        
                                                                                            
                    |
|                                                                                           
                                                                                            
                    |
|2018 [facility] [place] [facility]                                
                                                                                            
                    |
|  0. Cough                                                                                 
                                                                                            
                    |
|   1. Pneumonia due to Pseudomonas                                                         
                                                                                            
                    |
|   2. End stage renal disease                                                              
                                                                                            
                    |
|   3. 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
onic kidney disease, or end stage renal disease                                             
                    |
|   4. Cachexia                                                                             
                                                                                            
                    |
|   5. Chronic obstructive pulmonary disease with acute lower respiratory infection         
                                                                                            
                    |
|   6. Type 2 diabetes mellitus with diabetic chronic kidney disease                        
                                                                                            
                    |
|   7. Heart failure, unspecified                                                           
 
                                                                                            
                    |
|   8. Hyperlipidemia, unspecified                                                          
                                                                                            
                    |
|   9. Gastro-esophageal reflux disease without esophagitis                                 
                                                                                            
                    |
|                                                                                           
                                                                                            
                    |
|2018 [facility] [place] [place] [facility]                                 
                                                                                            
                    |
|  0. Other chest pain                                                                      
                                                                                            
                    |
|   1. Gastro-esophageal reflux disease without esophagitis                                 
                                                                                            
                    |
|   2. End stage renal disease                                                              
                                                                                            
                    |
|   3. 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
onic kidney disease, or end stage renal disease                                             
                    |
|   4. Chronic systolic (congestive) heart failure                                          
                                                                                            
                    |
|   5. 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
ctoris                                                                                      
                    |
|   6. Type 2 diabetes mellitus with diabetic chronic kidney disease                        
                                                                                            
                    |
|   7. Hyperlipidemia, unspecified                                                          
                                                                                            
                    |
|   8. Major depressive disorder, single episode, unspecified                               
                                                                                            
                    |
|   9. Chronic obstructive pulmonary disease, unspecified                                   
                                                                                            
                    |
|                                                                                           
                                                                                            
                    |
|Mar 6, 2018 [facility]                              
                                                                                            
                    |
|  Heart Failure                                                                            
                                                                                            
                    |
|   Need for iv access                                                                      
                                                                                            
                    |
|   Type 2 diabetes mellitus with other specified complication                              
                                                                                            
                    |
|   Long term (current) use of insulin                                                      
 
                                                                                            
                    |
|   Paroxysmal atrial fibrillation                                                          
                                                                                            
                    |
|   Anemia in chronic kidney disease                                                        
                                                                                            
                    |
|   Atherosclerotic heart disease of native coronary artery without angina pectoris         
                                                                                            
                    |
|   Cardiomyopathy, unspecified                                                             
                                                                                            
                    |
|   End stage renal disease                                                                 
                                                                                            
                    |
|   Other specified postprocedural states                                                   
                                                                                            
                    |
|                                                                                           
                                                                                            
                    |
|                                                                                           
                                                                                            
                    |
|                                                                                           
                                                                                            
                    |
|Care Providers                                                                             
                                                                                            
                    |
|Provider PRC Type Phone Fax Service Dates                                                  
                                                                                            
                    |
|HEADINGS, SALEEM HENDERSONN.P. Nurse Practitioner: Family   Current                                
                                                                                            
                    |
|[facility] Antonio Martinez /Care Coordinator [phone]  2019 - Current         
                                                                                            
                    |
|[facility] [phone]  2019 - Current                          
                                                                                            
                    |
|[facility]  [phone] Current                   
                                                                                            
                    |
[facility]   Current                                
                                                                                            
                    |
|[facility] GON[facility]ALE[facility] Primary Care   Current                                                        
                                                                                            
                    |
|[facility] Provider [phone] Current                 
                                                                                            
                    |
|[facility] Case or Care Manager   Current                                                   
                                                                                            
                    |
|MIRTA Goel Case or Care Manager [phone] Current
 
                                                                                            
                    |
|Jairo Gutierrez MD Other   Current                                                       
                                                                                            
                    |
|                                                                                           
                                                                                            
                    |
|[employer] [facility]                                                                          
                                                                                            
                    |
|This patient has registered at the [facility] Emergency Department     
                                                                                            
                    |
|For more information visit: [email or web address]
[email or web address]                                                                               
                    |
|The above information is provided for the sole purpose of patient treatment. Use of this in
formation beyond the terms of Data Sharing Memorandum of Understanding and License Agreement
 is prohibited. In  |
|certain cases not all visits may be represented. Consult the aforementioned facilities for 
additional information.                                                                     
                    |
|2019 [employer]. - [place], [place] - [email or web address]                                                                                       
                    |
+-------------------------------------------------------------------------------------------
--------------------------------------------------------------------------------------------
--------------------+
 
 
 
+-------------------+---------+--------------------+--------------+
| Performing        | Address | City/State/Zipcode | Phone Number |
| Organization      |         |                    |              |
+-------------------+---------+--------------------+--------------+
|   ED INFORMATION  |         |                    |              |
| EXCHANGE          |         |                    |              |
+-------------------+---------+--------------------+--------------+
 in this encounter
 
 Visit Diagnoses
 
 
+-------------------------------------------------------------------------------------+
| Diagnosis                                                                           |
+-------------------------------------------------------------------------------------+
|   Fall in home, initial encounter - Primary                                         |
+-------------------------------------------------------------------------------------+
|   Cellulitis of right toe                                                           |
+-------------------------------------------------------------------------------------+
|   Closed displaced fracture of distal phalanx of right great toe, initial encounter |
+-------------------------------------------------------------------------------------+
|   Generalized weakness                                                              |
+-------------------------------------------------------------------------------------+
|   Other malaise and fatigue                                                         |
+-------------------------------------------------------------------------------------+
|   Fatigue, unspecified type                                                         |
+-------------------------------------------------------------------------------------+
 
 
 
 
 Admitting Diagnoses
 
 
+-------------------------------------------------------------------------------------+
| Diagnosis                                                                           |
+-------------------------------------------------------------------------------------+
|   Generalized weakness                                                              |
+-------------------------------------------------------------------------------------+
|   Other malaise and fatigue                                                         |
+-------------------------------------------------------------------------------------+
|   Closed displaced fracture of distal phalanx of right great toe, initial encounter |
+-------------------------------------------------------------------------------------+
|   Fall in home, initial encounter                                                   |
+-------------------------------------------------------------------------------------+
|   Cellulitis of right toe                                                           |
+-------------------------------------------------------------------------------------+
|   Fatigue, unspecified type                                                         |
+-------------------------------------------------------------------------------------+
 
 
 
 Administered Medications
 
 
+------------------+--------+---------+------+------+------+
| Medication Order | MAR    | Action  | Dose | Rate | Site |
|                  | Action | Date    |      |      |      |
+------------------+--------+---------+------+------+------+
 
 
 
+-----------------------------------+---+
|   acetaminophen (TYLENOL)         |   |
| suppository 650 mg  650 mg,       |   |
| Rectal, Every 6 Hours PRN, Mild   |   |
| Pain (1-3), Fever, Starting Thu   |   |
| 19 at 2229                   |   |
+-----------------------------------+---+
|                                   |   |
+-----------------------------------+---+
|   acetaminophen (TYLENOL) tablet  |   |
| 650 mg  650 mg, Oral, Every 6     |   |
| Hours PRN, Mild Pain (1-3),       |   |
| Fever, Starting Thu 19 at    |   |
| 2229                              |   |
+-----------------------------------+---+
|                                   |   |
+-----------------------------------+---+
 
 
 
+-----------------------------------+-------+----------+--------+---+---+
|   apixaban (ELIQUIS) tablet 2.5   | Given |  | 2.5 mg |   |   |
| mg  2.5 mg, Oral, 2 Times Daily,  |       | 9 23:39  |        |   |   |
| First dose on Thu 19 at 2300 |       | PST      |        |   |   |
+-----------------------------------+-------+----------+--------+---+---+
 
 
 
 
+-------+----------+--------+---+---+
| Given | 2/15/201 | 2.5 mg |   |   |
|       | 9 09:40  |        |   |   |
|       | PST      |        |   |   |
+-------+----------+--------+---+---+
 
 
 
+---+---+
|   |   |
+---+---+
 
 
 
+-----------------------------------+-------+----------+-------+---+---+
|   atorvastatin (LIPITOR) tablet   | Given |  | 80 mg |   |   |
| 80 mg  80 mg, Oral, Nightly,      |       | 9 23:38  |       |   |   |
| First dose on Thu 19 at 2300 |       | PST      |       |   |   |
+-----------------------------------+-------+----------+-------+---+---+
 
 
 
+---+---+
|   |   |
+---+---+
 
 
 
+-----------------------------------+-------+----------+---------+---+---+
|   carvedilol (COREG) tablet 12.5  | Given |  | 12.5 mg |   |   |
| mg  12.5 mg, Oral, 2 Times Daily  |       | 9 23:38  |         |   |   |
| With Meals, First dose on Thu     |       | PST      |         |   |   |
| 19 at 2300                   |       |          |         |   |   |
+-----------------------------------+-------+----------+---------+---+---+
 
 
 
+-------+----------+---------+---+---+
| Given | 2/15/201 | 12.5 mg |   |   |
|       | 9 09:41  |         |   |   |
|       | PST      |         |   |   |
+-------+----------+---------+---+---+
 
 
 
+---+---+
|   |   |
+---+---+
 
 
 
+-----------------------------------+---------+----------+-----+-------+---+
|   cefTAZidime (FORTAZ) 2 g in     | New Bag |  | 2 g | 100   |   |
| sodium chloride (IV) 0.9 % 50 mL  |         | 9 18:05  |     | mL/hr |   |
| IVPB  2 g, Intravenous,           |         | PST      |     |       |   |
| Administer over 30 Minutes, Once, |         |          |     |       |   |
|  Thu 19 at 1700, For 1 dose  |         |          |     |       |   |
+-----------------------------------+---------+----------+-----+-------+---+
 
 
 
 
+-----------------------------------+---+
|                                   |   |
+-----------------------------------+---+
|   dextrose 10 % infusion  at      |   |
| 0-100 mL/hr, Intravenous,         |   |
| Continuous PRN, Hypoglycemia,     |   |
| Starting Thu 19 at , For |   |
|  BG 70 or less run infusion at    |   |
| 100 mL/ hr. Discontinue when BG   |   |
| increases to 100 mg/dl or greater |   |
|  x 2-3 hours and patient is       |   |
| eating. Call provider if BG not   |   |
| maintained after 2-3 hours.       |   |
+-----------------------------------+---+
|                                   |   |
+-----------------------------------+---+
|   dextrose 50 % solution 12 mL    |   |
| 12 mL, Intravenous, PRN,          |   |
| Hypoglycemia (BG < 70 mg/dL),     |   |
| Starting Thu 19 at       |   |
+-----------------------------------+---+
|                                   |   |
+-----------------------------------+---+
|   dextrose 50 % solution 25 mL    |   |
| 25 mL, Intravenous, PRN,          |   |
| Hypoglycemia (BG < 70 mg/dL),     |   |
| Starting Thu 19 at       |   |
+-----------------------------------+---+
|                                   |   |
+-----------------------------------+---+
|   glucagon (GLUCAGEN) injection   |   |
| 0.5 mg  0.5 mg, Intramuscular,    |   |
| PRN, Hypoglycemia, (BG < 70       |   |
| mg/dL), Starting Thu 19 at   |   |
|                               |   |
+-----------------------------------+---+
|                                   |   |
+-----------------------------------+---+
|   glucagon (GLUCAGEN) injection 1 |   |
|  mg  1 mg, Intramuscular, PRN,    |   |
| Hypoglycemia, (BG < 70 mg/dL),    |   |
| Starting u 19 at       |   |
+-----------------------------------+---+
|                                   |   |
+-----------------------------------+---+
 
 
 
+-----------------------------------+-------+----------+----------+---+---+
|   HYDROcodone-acetaminophen       | Given |  | 1 tablet |   |   |
| (NORCO) 5-325 MG per tablet 1     |       | 9 17:51  |          |   |   |
| tablet  1 tablet, Oral, Once, Thu |       | PST      |          |   |   |
|  19 at 1740, For 1 dose      |       |          |          |   |   |
+-----------------------------------+-------+----------+----------+---+---+
 
 
 
+---+---+
 
|   |   |
+---+---+
 
 
 
+-----------------------------------+-------+----------+----------+---+---+
|   HYDROcodone-acetaminophen       | Given |  | 1 tablet |   |   |
| (NORCO) 5-325 MG per tablet 1     |       | 9 23:39  |          |   |   |
| tablet  1 tablet, Oral, Every 4   |       | PST      |          |   |   |
| Hours PRN, Moderate Pain (4-6),   |       |          |          |   |   |
| Severe Pain (7-10), Starting Thu  |       |          |          |   |   |
| 19 at                    |       |          |          |   |   |
+-----------------------------------+-------+----------+----------+---+---+
 
 
 
+-------+----------+----------+---+---+
| Given | 2/15/201 | 1 tablet |   |   |
|       | 9 04:30  |          |   |   |
|       | PST      |          |   |   |
+-------+----------+----------+---+---+
| Given | 2/15/201 | 1 tablet |   |   |
|       | 9 09:39  |          |   |   |
|       | PST      |          |   |   |
+-------+----------+----------+---+---+
 
 
 
+---+---+
|   |   |
+---+---+
 
 
 
+-----------------------------------+-------+----------+----------+---+---+
|   insulin glargine (LANTUS)       | Given | 2/15/201 | 21 Units |   |   |
| injection 21 Units  21 Units,     |       | 9 00:29  |          |   |   |
| Subcutaneous, Nightly, First dose |       | PST      |          |   |   |
|  on Fri 2/15/19 at 0000           |       |          |          |   |   |
+-----------------------------------+-------+----------+----------+---+---+
 
 
 
+---+---+
|   |   |
+---+---+
 
 
 
+-----------------------------------+-------+----------+---------+---+---+
|   insulin lispro (human)          | Given |  | 3 Units |   |   |
| (HUMALOG) injection 0-10 Units    |       | 9 23:36  |         |   |   |
| 0-10 Units, Subcutaneous, 3 Times |       | PST      |         |   |   |
|  Daily Before Meals, First dose   |       |          |         |   |   |
| on 19 at 2300            |       |          |         |   |   |
+-----------------------------------+-------+----------+---------+---+---+
 
 
 
+-------+----------+---------+---+---+
 
| Given | 2/15/201 | 3 Units |   |   |
|       | 9 12:25  |         |   |   |
|       | PST      |         |   |   |
+-------+----------+---------+---+---+
 
 
 
+---+---+
|   |   |
+---+---+
 
 
 
+-----------------------------------+-------+----------+---------+---+---+
|   insulin lispro (human)          | Given |  | 1 Units |   |   |
| (HUMALOG) injection 0-5 Units     |       | 9 23:36  |         |   |   |
| 0-5 Units, Subcutaneous, Nightly, |       | PST      |         |   |   |
|  First dose on 19 at     |       |          |         |   |   |
| 2300                              |       |          |         |   |   |
+-----------------------------------+-------+----------+---------+---+---+
 
 
 
+---+---+
|   |   |
+---+---+
 
 
 
+-----------------------------------+-------+----------+-------+---+---+
|   isosorbide mononitrate 24 hr    | Given | 2/15/201 | 60 mg |   |   |
| tablet 60 mg  60 mg, Oral, Daily, |       | 9 09:40  |       |   |   |
|  First dose on Fri 2/15/19 at     |       | PST      |       |   |   |
| 0900                              |       |          |       |   |   |
+-----------------------------------+-------+----------+-------+---+---+
 
 
 
+---+---+
|   |   |
+---+---+
 
 
 
+-----------------------------------+-------+----------+------+---+---+
|   LORazepam (ATIVAN) tablet 1 mg  | Given | 2/15/201 | 1 mg |   |   |
|  1 mg, Oral, Every 8 Hours PRN,   |       | 9 13:33  |      |   |   |
| Anxiety, Starting Fri 2/15/19 at  |       | PST      |      |   |   |
| 1247                              |       |          |      |   |   |
+-----------------------------------+-------+----------+------+---+---+
 
 
 
+---+---+
|   |   |
+---+---+
 
 
 
+-----------------------------------+-------+----------+--------+---+---+
 
|   losartan (COZAAR) tablet 100 mg | Given | 2/15/201 | 100 mg |   |   |
|   100 mg, Oral, Daily, First dose |       | 9 09:41  |        |   |   |
|  on Fri 2/15/19 at 0900           |       | PST      |        |   |   |
+-----------------------------------+-------+----------+--------+---+---+
 
 
 
+---+---+
|   |   |
+---+---+
 
 
 
+-----------------------------------+-------+----------+-------+---+---+
|   nortriptyline (PAMELOR) capsule | Given | 2/15/201 | 25 mg |   |   |
|  25 mg  25 mg, Oral, Every        |       | 9 09:39  |       |   |   |
| Morning, First dose on Fri        |       | PST      |       |   |   |
| 2/15/19 at 0900                   |       |          |       |   |   |
+-----------------------------------+-------+----------+-------+---+---+
 
 
 
+---+---+
|   |   |
+---+---+
 
 
 
+-----------------------------------+-------+----------+-------+---+---+
|   nortriptyline (PAMELOR) capsule | Given | 2/15/201 | 75 mg |   |   |
|  75 mg  75 mg, Oral, Nightly,     |       | 9 00:30  |       |   |   |
| First dose on Fri 2/15/19 at 0000 |       | PST      |       |   |   |
+-----------------------------------+-------+----------+-------+---+---+
 
 
 
+-----------------------------------+---+
|                                   |   |
+-----------------------------------+---+
|   ondansetron (ZOFRAN) injection  |   |
| 4 mg  4 mg, Intravenous, Every 6  |   |
| Hours PRN, Nausea, Vomiting,      |   |
| Starting Thu 19 at 2229      |   |
+-----------------------------------+---+
|                                   |   |
+-----------------------------------+---+
|   ondansetron (ZOFRAN-ODT)        |   |
| disintegrating tablet 4 mg  4 mg, |   |
|  Oral, Every 6 Hours PRN, Nausea, |   |
|  Vomiting, Starting Thu 19   |   |
| at 2229                           |   |
+-----------------------------------+---+
|                                   |   |
+-----------------------------------+---+
 
 
 
+-----------------------------------+-------+----------+--------+---+---+
|   oxybutynin (DITROPAN) tablet    | Given |  | 7.5 mg |   |   |
| 7.5 mg  7.5 mg, Oral, 2 Times     |       | 9 23:38  |        |   |   |
 
| Daily, First dose on Thu 19  |       | PST      |        |   |   |
| at 2300                           |       |          |        |   |   |
+-----------------------------------+-------+----------+--------+---+---+
 
 
 
+-------+----------+--------+---+---+
| Given | 2/15/201 | 7.5 mg |   |   |
|       | 9 09:39  |        |   |   |
|       | PST      |        |   |   |
+-------+----------+--------+---+---+
 
 
 
+---+---+
|   |   |
+---+---+
 
 
 
+-----------------------------------+-------+----------+-------+---+---+
|   pantoprazole (PROTONIX) EC      | Given | 2/15/201 | 40 mg |   |   |
| tablet 40 mg  40 mg, Oral, Every  |       | 9 06:25  |       |   |   |
| Morning Before Breakfast, First   |       | PST      |       |   |   |
| dose on Fri 2/15/19 at 0630       |       |          |       |   |   |
+-----------------------------------+-------+----------+-------+---+---+
 
 
 
+---+---+
|   |   |
+---+---+
 
 
 
+-----------------------------------+-------+----------+---------+---+---+
|   rOPINIRole (REQUIP) tablet 0.75 | Given |  | 0.75 mg |   |   |
|  mg  0.75 mg, Oral, Nightly,      |       | 9 23:39  |         |   |   |
| First dose on u 19 at 2200 |       | PST      |         |   |   |
+-----------------------------------+-------+----------+---------+---+---+
 
 
 
+---+---+
|   |   |
+---+---+
 
 
 
+-----------------------------------+-------+----------+--------+---+---+
|   sodium chloride (PF) 0.9 %      | Given |  | 10 mLs |   |   |
| flush 10 mL  10 mL, Intravenous,  |       | 9 23:39  |        |   |   |
| Every 8 Hours, First dose on Thu  |       | PST      |        |   |   |
| 19 at 2300                   |       |          |        |   |   |
+-----------------------------------+-------+----------+--------+---+---+
 
 
 
+-------+----------+--------+---+---+
| Given | 2/15/201 | 10 mLs |   |   |
 
|       | 9 06:26  |        |   |   |
|       | PST      |        |   |   |
+-------+----------+--------+---+---+
 
 
 
+---+---+
|   |   |
+---+---+
 
 
 
+----------------------------------+---------+----------+----------+---+---+
|   vancomycin (VANCOCIN) 1500     | New Bag |  | 1,500 mg |   |   |
| mg/250 mL IVPB  1,500 mg,        |         | 9 18:41  |          |   |   |
| Intravenous, Administer over 90  |         | PST      |          |   |   |
| Minutes, Once, Thu 19 at    |         |          |          |   |   |
| 1700, For 1 dose                 |         |          |          |   |   |
+----------------------------------+---------+----------+----------+---+---+
 
 
 
+---+---+
|   |   |
+---+---+
 in this encounter

## 2019-04-09 NOTE — XMS
Encounter Summary
  Created on: 2019
 
 Cherie Villalobos
 External Reference #: [id number]
 : 48
 Sex: Female
 
 Demographics
 
 
+-----------------------+--------------------------+
| Address               | [address]               |
|                       | [place]  [address] |
+-----------------------+--------------------------+
| Home Phone            | [phone]          |
+-----------------------+--------------------------+
| Preferred Language    | Unknown                  |
+-----------------------+--------------------------+
| Marital Status        |                   |
+-----------------------+--------------------------+
| Sabianism Affiliation | [id number]                     |
+-----------------------+--------------------------+
| Race                  | Unknown                  |
+-----------------------+--------------------------+
| Ethnic Group          | Unknown                  |
+-----------------------+--------------------------+
 
 
 Author
 
 
+--------------+--------------------------------------------+
| Author       | [facility]  |
|              | [facility] [place]                                |
+--------------+--------------------------------------------+
| Organization | [facility]  |
|              | [facility] [place]                                |
+--------------+--------------------------------------------+
| Address      | Unknown                                    |
+--------------+--------------------------------------------+
| Phone        | Unavailable                                |
+--------------+--------------------------------------------+
 
 
 
 Support
 
 
+---------------+--------------+---------------------+-----------------+
| Name          | Relationship | Address             | Phone           |
+---------------+--------------+---------------------+-----------------+
| Anoop Villalobos | ECON         | [address], | [phone] |
|               |              |  [place]  [address]     |                 |
+---------------+--------------+---------------------+-----------------+
| Jennifer Dickson | ECON         | [place], [place]       | [phone] |
|               |              | [address]               |                 |
+---------------+--------------+---------------------+-----------------+
 
 
 
 
 Care Team Providers
 
 
+--------------------------+------+-----------------+
| Care Team Member Name    | Role | Phone           |
+--------------------------+------+-----------------+
| Araseli Montelongo Activity  | PCP  | [phone] |
| Professional             |      |                 |
+--------------------------+------+-----------------+
 
 
 
 Reason for Visit
 
 
+--------+--------------+
| Reason | Comments     |
+--------+--------------+
| Other  | heart issues |
+--------+--------------+
 
 
 
 Encounter Details
 
 
+--------+-----------+----------------------+----------------------+----------------------+
| Date   | Type      | Department           | Care Team            | Description          |
+--------+-----------+----------------------+----------------------+----------------------+
| / | Telephone |   [facility]          |   Abhijit,        | Other (heart issues) |
| 2019   |           | CARDIOLOGY  [address]    | [address]  [address]  |                      |
|        |           | [address]  [place], | [place]  [place], |                      |
|        |           |  [place] [address]       |  [place] [address]       |                      |
|        |           | [phone]         | [phone]         |                      |
|        |           |                      | [phone] (Fax)   |                      |
+--------+-----------+----------------------+----------------------+----------------------+
 
 
 
 Social History
 
 
+---------------+------------+-----------+--------+------------------+
| Tobacco Use   | Types      | Packs/Day | Years  | Date             |
|               |            |           | Used   |                  |
+---------------+------------+-----------+--------+------------------+
| Former Smoker | Cigarettes | 1         | 30     | Quit: 2004 |
+---------------+------------+-----------+--------+------------------+
 
 
 
+---------------------+---+---+---+
| Smokeless Tobacco:  |   |   |   |
| Never Used          |   |   |   |
+---------------------+---+---+---+
 
 
 
 
+-------------+-----------+---------+----------+
| Alcohol Use | Drinks/We | oz/Week | Comments |
|             | ek        |         |          |
+-------------+-----------+---------+----------+
| No          |           |         |          |
+-------------+-----------+---------+----------+
 
 
 
+------------------+---------------+
| Sex Assigned at  | Date Recorded |
| Birth            |               |
+------------------+---------------+
| Not on file      |               |
+------------------+---------------+
 
 
 
+----------------+-------------+-------------+
| Job Start Date | Occupation  | Industry    |
+----------------+-------------+-------------+
| Not on file    | Not on file | Not on file |
+----------------+-------------+-------------+
 
 
 
+----------------+--------------+------------+
| Travel History | Travel Start | Travel End |
+----------------+--------------+------------+
 
 
 
+-------------------------------------+
| No recent travel history available. |
+-------------------------------------+
 documented as of this encounter
 
 Functional Status
 
 
+---------------------------------------------+----------+--------------------+
| Functional Status                           | Response | Date of Assessment |
+---------------------------------------------+----------+--------------------+
| Are you deaf or do you have serious         | No       | 2018         |
| difficulty hearing?                         |          |                    |
+---------------------------------------------+----------+--------------------+
| Are you blind or do you have serious        | No       | 2018         |
| difficulty seeing, even when wearing        |          |                    |
| glasses?                                    |          |                    |
+---------------------------------------------+----------+--------------------+
| Do you have serious difficulty walking or   | No       | 2018         |
| climbing stairs? (5 years old or older)     |          |                    |
+---------------------------------------------+----------+--------------------+
| Do you have difficulty dressing or bathing? | No       | 2018         |
|  (5 years old or older)                     |          |                    |
+---------------------------------------------+----------+--------------------+
| Because of a physical, mental, or emotional | No       | 2018         |
|  condition, do you have difficulty doing    |          |                    |
| errands alone such as visiting a doctor's   |          |                    |
 
| office or shopping? [15 years old or        |          |                    |
| older)]                                     |          |                    |
+---------------------------------------------+----------+--------------------+
 
 
 
+---------------------------------------------+----------+--------------------+
| Cognitive Status                            | Response | Date of Assessment |
+---------------------------------------------+----------+--------------------+
| Because of a physical, mental, or emotional | No       | 2018         |
|  condition, do you have serious difficulty  |          |                    |
| concentrating, remembering, or making       |          |                    |
| decisions? (5 years old or older)           |          |                    |
+---------------------------------------------+----------+--------------------+
 documented as of this encounter
 
 Plan of Treatment
 
 
+--------+---------+------------+----------------------+-------------+
| Date   | Type    | Specialty  | Care Team            | Description |
+--------+---------+------------+----------------------+-------------+
| / | Office  | Cardiology |   Johnie John, |             |
|    | Visit   |            |  MD  [address] |             |
|        |         |            |  [place],   |             |
|        |         |            | [place] [address]             |             |
|        |         |            | [phone]         |             |
|        |         |            | [phone] (Fax)   |             |
+--------+---------+------------+----------------------+-------------+
 documented as of this encounter
 
 Visit Diagnoses
 Not on filedocumented in this encounter

## 2019-04-09 NOTE — XMS
Encounter Summary
  Created on: 2019
 
 Cherie Villalobos
 External Reference #: [id number]
 : 48
 Sex: Female
 
 Demographics
 
 
+-----------------------+--------------------------+
| Address               | [address]               |
|                       | [place]  [address] |
+-----------------------+--------------------------+
| Home Phone            | [phone]          |
+-----------------------+--------------------------+
| Preferred Language    | Unknown                  |
+-----------------------+--------------------------+
| Marital Status        |                   |
+-----------------------+--------------------------+
| Anabaptist Affiliation | [id number]                     |
+-----------------------+--------------------------+
| Race                  | Unknown                  |
+-----------------------+--------------------------+
| Ethnic Group          | Unknown                  |
+-----------------------+--------------------------+
 
 
 Author
 
 
+--------------+--------------------------------------------+
| Author       | [facility]  |
|              | [facility] [place]                                |
+--------------+--------------------------------------------+
| Organization | [facility]  |
|              | [facility] [place]                                |
+--------------+--------------------------------------------+
| Address      | Unknown                                    |
+--------------+--------------------------------------------+
| Phone        | Unavailable                                |
+--------------+--------------------------------------------+
 
 
 
 Support
 
 
+---------------+--------------+---------------------+-----------------+
| Name          | Relationship | Address             | Phone           |
+---------------+--------------+---------------------+-----------------+
| Anoop Villalobos | ECON         | [address], | [phone] |
|               |              |  [place]  [address]     |                 |
+---------------+--------------+---------------------+-----------------+
| Jennifer Dickson | [employer]CON         | [place], [place]       | [phone] |
|               |              | [id number]               |                 |
+---------------+--------------+---------------------+-----------------+
 
 
 
 
 Care Team Providers
 
 
+--------------------------+------+-----------------+
| Care Team Member Name    | Role | Phone           |
+--------------------------+------+-----------------+
| Araseli Montelongo Activity  | PCP  | [phone] |
| Professional             |      |                 |
+--------------------------+------+-----------------+
 
 
 
 Reason for Visit
 
 
+--------+------------------------------------+
| Reason | Comments                           |
+--------+------------------------------------+
| Other  | medication changed due to ER visit |
+--------+------------------------------------+
 
 
 
 Encounter Details
 
 
+--------+-----------+----------------------+----------------------+--------------------+
| Date   | Type      | Department           | Care Team            | Description        |
+--------+-----------+----------------------+----------------------+--------------------+
| / | Telephone |   [facility]          |   Johnie John, | Other (medication  |
| 2019   |           | CARDIOLOGY  [address]    |  MD  [address] | changed due to ER  |
|        |           | [address]  [place], |  [place],   | visit)             |
|        |           |  [place] [address]       | [place] [address]             |                    |
|        |           | [phone]         | [phone]         |                    |
|        |           |                      | [phone] (Fax)   |                    |
+--------+-----------+----------------------+----------------------+--------------------+
 
 
 
 Social History
 
 
+---------------+------------+-----------+--------+------------------+
| Tobacco Use   | Types      | Packs/Day | Years  | Date             |
|               |            |           | Used   |                  |
+---------------+------------+-----------+--------+------------------+
| Former Smoker | Cigarettes | 1         | 30     | Quit: 2004 |
+---------------+------------+-----------+--------+------------------+
 
 
 
+---------------------+---+---+---+
| Smokeless Tobacco:  |   |   |   |
| Never Used          |   |   |   |
+---------------------+---+---+---+
 
 
 
 
+-------------+-----------+---------+----------+
| Alcohol Use | Drinks/We | oz/Week | Comments |
|             | ek        |         |          |
+-------------+-----------+---------+----------+
| No          |           |         |          |
+-------------+-----------+---------+----------+
 
 
 
+------------------+---------------+
| Sex Assigned at  | Date Recorded |
| Birth            |               |
+------------------+---------------+
| Not on file      |               |
+------------------+---------------+
 
 
 
+----------------+-------------+-------------+
| Job Start Date | Occupation  | Industry    |
+----------------+-------------+-------------+
| Not on file    | Not on file | Not on file |
+----------------+-------------+-------------+
 
 
 
+----------------+--------------+------------+
| Travel History | Travel Start | Travel End |
+----------------+--------------+------------+
 
 
 
+-------------------------------------+
| No recent travel history available. |
+-------------------------------------+
 documented as of this encounter
 
 Functional Status
 
 
+---------------------------------------------+----------+--------------------+
| Functional Status                           | Response | Date of Assessment |
+---------------------------------------------+----------+--------------------+
| Are you deaf or do you have serious         | No       | 2018         |
| difficulty hearing?                         |          |                    |
+---------------------------------------------+----------+--------------------+
| Are you blind or do you have serious        | No       | 2018         |
| difficulty seeing, even when wearing        |          |                    |
| glasses?                                    |          |                    |
+---------------------------------------------+----------+--------------------+
| Do you have serious difficulty walking or   | No       | 2018         |
| climbing stairs? (5 years old or older)     |          |                    |
+---------------------------------------------+----------+--------------------+
| Do you have difficulty dressing or bathing? | No       | 2018         |
|  (5 years old or older)                     |          |                    |
+---------------------------------------------+----------+--------------------+
| Because of a physical, mental, or emotional | No       | 2018         |
|  condition, do you have difficulty doing    |          |                    |
| errands alone such as visiting a doctor's   |          |                    |
 
| office or shopping? [15 years old or        |          |                    |
| older)]                                     |          |                    |
+---------------------------------------------+----------+--------------------+
 
 
 
+---------------------------------------------+----------+--------------------+
| Cognitive Status                            | Response | Date of Assessment |
+---------------------------------------------+----------+--------------------+
| Because of a physical, mental, or emotional | No       | 2018         |
|  condition, do you have serious difficulty  |          |                    |
| concentrating, remembering, or making       |          |                    |
| decisions? (5 years old or older)           |          |                    |
+---------------------------------------------+----------+--------------------+
 documented as of this encounter
 
 Plan of Treatment
 
 
+--------+---------+------------+----------------------+-------------+
| Date   | Type    | Specialty  | Care Team            | Description |
+--------+---------+------------+----------------------+-------------+
| / | Office  | Cardiology |   Johnie John, |             |
|    | Visit   |            |  MD  [address] |             |
|        |         |            |  [place],   |             |
|        |         |            | [place] [address]             |             |
|        |         |            | [phone]         |             |
|        |         |            | [phone] (Fax)   |             |
+--------+---------+------------+----------------------+-------------+
 documented as of this encounter
 
 Visit Diagnoses
 Not on filedocumented in this encounter

## 2019-04-09 NOTE — XMS
Encounter Summary
  Created on: 2019
 
 Cherie Villalobos
 External Reference #: [id number]
 : 48
 Sex: Female
 
 Demographics
 
 
+-----------------------+--------------------------+
| Address               | [address]               |
|                       | [place]  [address] |
+-----------------------+--------------------------+
| Home Phone            | [phone]          |
+-----------------------+--------------------------+
| Preferred Language    | Unknown                  |
+-----------------------+--------------------------+
| Marital Status        |                   |
+-----------------------+--------------------------+
| Denominational Affiliation | [id number]                     |
+-----------------------+--------------------------+
| Race                  | Unknown                  |
+-----------------------+--------------------------+
| Ethnic Group          | Unknown                  |
+-----------------------+--------------------------+
 
 
 Author
 
 
+--------------+-----------------------+
| Author       | [employer] |
+--------------+-----------------------+
| Organization | [facility] |
+--------------+-----------------------+
| Address      | Unknown               |
+--------------+-----------------------+
| Phone        | Unavailable           |
+--------------+-----------------------+
 
 
 
 Support
 
 
+---------------+--------------+---------------------+-----------------+
| Name          | Relationship | Address             | Phone           |
+---------------+--------------+---------------------+-----------------+
| Anoop Villalobos | ECON         | [place], | [phone] |
|               |              |  [place]  [address]     |                 |
+---------------+--------------+---------------------+-----------------+
| Jennifer Dickson | ECON         | [place]       | [phone] |
|               |              | [phone]               |                 |
+---------------+--------------+---------------------+-----------------+
 
 
 
 
 Care Team Providers
 
 
+-----------------------+------+-----------------+
| Care Team Member Name | Role | Phone           |
+-----------------------+------+-----------------+
| Ivy Couch DO      | PCP  | [phone] |
+-----------------------+------+-----------------+
 
 
 
 Reason for Visit
 
 
+-----------+------------------------------------------------------------------+
| Reason    | Comments                                                         |
+-----------+------------------------------------------------------------------+
| Foot Pain | possible infection in rt foot, was told to come in for a direct  |
|           | admit through the ER                                             |
+-----------+------------------------------------------------------------------+
 Auth/Cert
 
+--------+--------+-----------+--------------+--------------+---------------+
| Status | Reason | Specialty | Diagnoses /  | Referred By  | Referred To   |
|        |        |           | Procedures   | Contact      | Contact       |
+--------+--------+-----------+--------------+--------------+---------------+
|        |        | Internal  |   Diagnoses  |              |   [facility]    |
|        |        | Medicine  |  PVD         |              | [facility]   |
|        |        |           | (peripheral  |              | [address]
|        |        |           | vascular     |              |  [address]   |
|        |        |           | disease)     |              | [place], [place]  |
|        |        |           | (HCC)  ESRD  |              | [address]  Phone: |
|        |        |           | (end stage   |              |  [phone] |
|        |        |           | renal        |              |   Fax:        |
|        |        |           | disease) on  |              | [phone]  |
|        |        |           | dialysis     |              |               |
|        |        |           | (HCC)        |              |               |
+--------+--------+-----------+--------------+--------------+---------------+
 
 
 
 
 Encounter Details
 
 
+--------+-----------+----------------------+----------------------+----------------------+
| Date   | Type      | Department           | Care Team            | Description          |
+--------+-----------+----------------------+----------------------+----------------------+
| / | Hospital  |   [facility]    |   [facility],        | PVD (peripheral      |
| 2019 - | Encounter | [address]   | MD Maria Luisa Jarrett      | vascular disease)    |
|        |           | [facility] | [address]           | ([facility]) (Primary Dx);  |
| / |           |   [address]     | [place] [address]   | ESRD (end stage      |
| 2019   |           | [place] [address]   | [phone]         | renal disease) on    |
|        |           | [phone]         | [phone] (Fax)   | dialysis ([facility]);      |
|        |           |                      | Moiz Josue      | Anemia in ESRD       |
|        |           |                      | MD [address] | (end-stage renal     |
|        |           |                      |  Blvd  [place]  | disease) ([facility]);      |
|        |           |                      | [address]  [phone]  | Hypoalbuminemia;     |
|        |           |                      |  [phone] (Fax)  | Electrolyte          |
 
|        |           |                      |                      | imbalance risk       |
+--------+-----------+----------------------+----------------------+----------------------+
 
 
 
 Social History
 
 
+---------------+------------+-----------+--------+------------------+
| Tobacco Use   | Types      | Packs/Day | Years  | Date             |
|               |            |           | Used   |                  |
+---------------+------------+-----------+--------+------------------+
| Former Smoker | Cigarettes | 1         | 30     | Quit: 2004 |
+---------------+------------+-----------+--------+------------------+
 
 
 
+---------------------+---+---+---+
| Smokeless Tobacco:  |   |   |   |
| Never Used          |   |   |   |
+---------------------+---+---+---+
 
 
 
+------------------------------+
| Comments: quite smoking  |
+------------------------------+
 
 
 
+-------------+-----------+---------+----------+
| Alcohol Use | Drinks/We | oz/Week | Comments |
|             | ek        |         |          |
+-------------+-----------+---------+----------+
| No          |           |         |          |
+-------------+-----------+---------+----------+
 
 
 
+------------------+---------------+
| Sex Assigned at  | Date Recorded |
| Birth            |               |
+------------------+---------------+
| Not on file      |               |
+------------------+---------------+
 as of this encounter
 
 Last Filed Vital Signs
 
 
+-------------------+----------------------+-------------------------+
| Vital Sign        | Reading              | Time Taken              |
+-------------------+----------------------+-------------------------+
| Blood Pressure    | 120/56               | 2019 11:40 AM PST |
+-------------------+----------------------+-------------------------+
| Pulse             | 64                   | 2019 11:40 AM PST |
+-------------------+----------------------+-------------------------+
| Temperature       | 36.4   C (97.5   F)  | 2019 11:40 AM PST |
+-------------------+----------------------+-------------------------+
| Respiratory Rate  | 18                   | 2019 11:40 AM PST |
 
+-------------------+----------------------+-------------------------+
| Oxygen Saturation | 97%                  | 2019 11:40 AM PST |
+-------------------+----------------------+-------------------------+
| Inhaled Oxygen    | -                    | -                       |
| Concentration     |                      |                         |
+-------------------+----------------------+-------------------------+
| Weight            | 65.5 kg (144 lb 6.4  | 2019  1:11 PM PST |
|                   | oz)                  |                         |
+-------------------+----------------------+-------------------------+
| Height            | 177.8 cm (5' 10")    | 2019  7:11 PM PST |
+-------------------+----------------------+-------------------------+
| Body Mass Index   | 20.72                | 2019  1:11 PM PST |
+-------------------+----------------------+-------------------------+
 in this encounter
 
 Discharge Summaries
 Prudence Nicholson MD-R2 - 2019  1:04 PM PSTFormatting of this note may be different fr
om the original.
 [facility]
 Service:  Hospitalist
 Resident Discharge Summary
 
 Date of Admission:  2019
 Date of Discharge:  
 
 Discharge Provider:  Prudence Nicholson MD-R2
 Treatment Team: 
 Consulting Physician: Srinivasna Jordan DPM
 Consulting Physician: Ethan Awad MD
 Admitting Provider: Luiza Rosales MD
 Discharge Diagnoses:   
 
 Principal Problem:
   PVD (peripheral vascular disease) ([facility])
 Active Problems:
   ESRD (end stage renal disease) (HCC)
   Type 2 diabetes mellitus with chronic kidney disease on chronic dialysis, with long-term 
current use of insulin (HCC)
   Anemia in ESRD (end-stage renal disease) (HCC)
   Hypertension, essential
   Chronic systolic congestive heart failure (HCC)
   COPD (chronic obstructive pulmonary disease) (HCC)
   Paroxysmal atrial fibrillation (HCC)
   CAD (coronary artery disease)
 Resolved Problems:
   * No resolved hospital problems. *
 
 BRIEF HISTORY OF PRESENTATION:  
      Patient Summary:  Per Dr. Rosales's H and P from 2019: '69 y/o F with h/o ESRD
 on HD T,,Sat (Dr. Asher), DM2, PAD s/p angioplasty of LLE and transmetatarsal amputation 
of left foot 18 by Dr. Jordan due to osteomyelitis, CAD, s/p MI, CABG, AVR , HFrE
F with last EF 20 to 25%, s/p AICD, AF on chronic anticoagulation who was sent to ED by Dr. Elliott for evaluation of right LE. Patient reports that at HD yesterday it was noted that he
r right toes were red and dusky. She went to see Dr. Elliott this morning and he was concerne
d that right toes could be ischemic or infected and referred to ED. '
 
 HOSPITAL COURSE:  
 Vascular surgery was consulted from the emergency department, they did a selective catheter
ization of the left common femoral artery and placed an SMA stent. The patient tolerated the
 procedure well. Podiatry was consulted, they recommended wound care consultation for the op
 
en wound on her left foot. They also stated that her right foot did not need a procedure for
 the toes at this time. Infectious disease is waiting for blood cultures and Gram stain and 
culture of the wound site to narrow her IV antibiotics. She reported improved pain, no short
ness of breath, no nausea and vomiting, she is making a small amount of urine and had a willie
l movement today. She would like to go home. Physical therapy cleared her to go home with [facility] assist. Nephrology was consulted and reported that they were amenable to administering he
r IV antibiotics with dialysis. Infectious disease was consulted and agreed to this plan. Up
on discharge the patient was eating, drinking, urinating, having bowel movements.she was not
 having fevers, nausea, or vomiting. 
 
 No prescriptions prior to admission. 
 
 DISCHARGE EXAM
 Vital Signs:
 /56 (BP Location: Right upper arm)  | Pulse 64  | Temp 97.5 F (36.4 C) (Axillary)
  | Resp 18  | Ht 1.778 m (5' 10")  | Wt 65.5 kg (144 lb 6.4 oz)  | SpO2 97%  | Breastfeedin
g? No  | BMI 20.72 kg/m 
 
 Physical Exam
 General Appearance: Alert and cooperative, sitting up in a chair by the bed and appears to 
be in no acute distress. 
 
 HEENNT: Normocephalic and atraumatic. Hearing grossly intact. No nasal discharge. No trache
al deviation. 
 
 Cardiovascular: Rhythm and rate are regular. 2/6 systolic murmur heard best over the left u
pper sternal border. No gallops or rubs appreciated. Peripheral pulses 2+ on the right. No l
ower extremity edema.
 
 Pulmonary/Chest: Lungs are clear to auscultation bilaterally. Effort is normal. Normal jadon
th sounds. 
 
 Abdominal: Soft, nontender, nondistended. Normoactive bowel sounds. No guarding or rebound 
tenderness. 
 
 Musculoskeletal: Normal range of motion. Normal muscular development. Patient with forefoot
 amputation on the left. Left foot wrapped in new dressing, C/D/I. Right foot with erythema 
over the great big toe and cyanosis over the second toe, improved from prior. 
 
 Neurological: CN II-XII grossly intact. Oriented to person, place, and time. 
 
 Skin: Skin is warm and dry. No rash noted. 
 
 Psychiatric:The patient was able to demonstrate good judgement and reason, without hallucin
ations, abnormal affect or abnormal behaviors during the examination. 
 
 DATA
 
 Recent Labs
 Lab 1938 19
 1543 
 WBC 3.90 3.39* 5.53 
 HGB 9.8* 9.5* 9.4* 
 HCT 30.1* 29.2* 28.4* 
 * 125* 147* 
 NEUTOPHILPCT  --  66.95  --  
 MONOPCT  --  11.96  --  
 
 
 Recent Labs
 Lab 19
 0538 19
 0453 19
 1543 
  140 139 
 K 3.9 4.2 4.0 
 CL 97* 101 100 
 CO2 31 28 29 
 BUN 13 26* 23 
 CREATININE 4.8* 6.8* 6.1* 
 PROT  --   --  6.2* 
 BILITOT  --   --  0.4 
 ALT  --   --  21 
 AST  --   --  24 
 
 Phosphorus:  
 Lab Results 
 Component Value Date 
  PHOS 3.6 2019 
 
 Invalid input(s): LABALBU
 
 Recent Labs
 Lab 19
 0538 
 MG 2.3 
 
 Recent Labs
 Lab 19
 1543 
 CKTOTAL 28* 
 
 Intake/Output Summary (Last 24 hours) at 19 1717
 Last data filed at 19 0850
  Gross per 24 hour 
 Intake              380 ml 
 Output                0 ml 
 Net              380 ml 
 
 Microbiology: Blood cultures - NGTD
 
 Radiology
 Us Lower Extremty  Arterial Right
 
 Result Date: 2019
 1. Right leg shows biphasic inflow with a greater than 50% stenosis at the origin of the dobbins
perficial femoral artery (137309).  Biphasic outflow. 2. Two vessel runoff to the right jeanne
t. Signed by: Eugenio Hoffman Sign Date/Time: 2019 3:11 PM
 
 PLAN
 
 Disposition:  Home
 Condition:  Stable
 Code Status:  Full Code
 
 No discharge procedures on file.
 
 Follow up:
 [facility]
 
 [address] [facility] [id number] [phone]
 Follow up in 3 week(s)
 
 Ivy Couch, DO
 [address], [address]
 [facility] [address]
 [phone]
 
 Ivy Couch, DO
 [address]
 [facility] [address]
 [phone]
 
 In 1 week
 
 Andrés Elliott MD
 [address] [facility] [id number] [phone]
 
 Call office for appointment
 
  
 Medication List 
  
 START taking these medications  
 cefTAZidime 2 g injection
 QTY:  1 each
 Refills:  0
 Commonly known as:  FORTAZ
 Inject 2 g into the muscle After Hemodialysis (see admin instructions) for 7 days.
  
 vancomycin IVPB
 Refills:  0
 Commonly known as:  VANCOCIN
 Inject 750 mg into the vein After Hemodialysis (see admin instructions) for 7 days. Dilute 
in appropriate volume of IV fluid and give by slow IV infusion.
  
  
 CHANGE how you take these medications  
 losartan 25 MG tablet
 QTY:  30 tablet
 Refills:  0
 Commonly known as:  COZAAR
 Take 1 tablet by mouth daily.
 What changed:  how much to take
  
  
 CONTINUE taking these medications  
 * albuterol 108 (90 Base) MCG/ACT inhaler
 Refills:  0
 Commonly known as:  PROVENTIL HFA;VENTOLIN HFA
  
 * VENTOLIN  (90 Base) MCG/ACT inhaler
 Refills:  0
 Generic drug:  albuterol
  
 apixaban 2.5 MG tablet
 
 QTY:  60 tablet
 Refills:  0
 Commonly known as:  ELIQUIS
 Take 1 tablet by mouth 2 (two) times daily.
  
 atorvastatin 40 MG tablet
 QTY:  30 tablet
 Refills:  0
 Commonly known as:  LIPITOR
 Take 1 tablet by mouth nightly.
  
 bisacodyl 10 MG suppository
 Refills:  0
 Commonly known as:  DULCOLAX
  
 calcium acetate 667 MG capsule
 Refills:  0
 Commonly known as:  PHOSLO
  
 carvedilol 12.5 MG tablet
 QTY:  60 tablet
 Refills:  0
 Doctor's comments:  Hold for SBP less than 100
 Hold for HR less than 60
 Commonly known as:  COREG
 Take 1 tablet by mouth 2 (two) times daily with meals.
  
 clopidogrel 75 MG tablet
 QTY:  30 tablet
 Refills:  11
 Commonly known as:  PLAVIX
 Take 1 tablet by mouth daily.
  
 esomeprazole 20 MG capsule
 Refills:  0
 Commonly known as:  NEXIUM
  
 HYDROcodone-acetaminophen 5-325 MG per tablet
 QTY:  30 tablet
 Refills:  0
 Commonly known as:  NORCO
 Take 1 tablet by mouth every 4 (four) hours as needed.
  
 insulin aspart 100 UNIT/ML injection
 Refills:  0
 Commonly known as:  NOVOLOG
  
 insulin degludec 100 UNIT/ML injection
 Refills:  0
 Commonly known as:  TRESIBA
  
 isosorbide mononitrate 60 MG 24 hr tablet
 QTY:  30 tablet
 Refills:  1
 Take 1 tablet by mouth daily.
  
 loperamide 2 MG capsule
 Refills:  0
 Commonly known as:  IMODIUM
  
 
 LORazepam 1 MG tablet
 QTY:  30 tablet
 Refills:  0
 Commonly known as:  ATIVAN
 Take 1 tablet by mouth every 8 (eight) hours as needed for Anxiety.
  
 nitroGLYCERIN 0.4 MG SL tablet
 QTY:  30 tablet
 Refills:  0
 Doctor's comments:  Hold for SBP less than 100
 Commonly known as:  NITROSTAT
 Place 1 tablet under the tongue every 5 (five) minutes as needed for Chest pain.
  
 nortriptyline 25 MG capsule
 Refills:  0
 Commonly known as:  PAMELOR
  
 ondansetron 4 MG disintegrating tablet
 Refills:  0
 Commonly known as:  ZOFRAN-ODT
  
 oxybutynin 5 MG tablet
 Refills:  0
 Commonly known as:  DITROPAN
  
 prochlorperazine 5 MG tablet
 QTY:  60 tablet
 Refills:  11
 Doctor's comments:  Please consider 90 day supplies to promote better adherence
 TAKE ONE TABLET BY MOUTH TWICE DAILY AS NEEDED FOR NAUSEA
  
 ranitidine 150 MG tablet
 Refills:  0
 Commonly known as:  ZANTAC
  
 rOPINIRole 0.25 MG tablet
 Refills:  0
 Commonly known as:  REQUIP
  
 SLOW-MAG 71.5-119 MG Tbec
 Refills:  0
 Generic drug:  Magnesium Cl-Calcium Carbonate
  
 Vitamin D3 5000 units Caps
 Refills:  0
  
 Vortioxetine HBr 10 MG Tabs
 Refills:  0
  
  
 * This list has 2 medication(s) that are the same as other medications prescribed for you. 
Read the directions carefully, and ask your doctor or other care provider to review them wit
h you. 
  
  
  
 You might also be taking other medications not listed above. If you have questions about an
y of your other medications, talk to the person who prescribed them or your Primary Care Pro
vider. 
  
 
  
  
 STOP taking these medications  
 aspirin 81 MG EC tablet
  
  
  
 Where to Get Your Medications 
  
 Information about where to get these medications is not yet available  
 Ask your nurse or doctor about these medications
  cefTAZidime 2 g injection
  vancomycin IVPB
  
 
 Prudence Nicholson MD-R2
 2019
 5:17 PM
 
 
 Associated attestation - Moiz Josue MD - 2019  5:12 PM PSTPatient seen an
d examined along with residents prior to discharge. Discussed with Dr. Elliott and  Dr. Asher. 
Patient will receive IV antibiotics ceftazidime and vancomycin with each HD. She insists on 
going home and is cleared for discharge by Dr. Asher and Dr. Elliott. 
 I agree with the discharge summary of Dr. Nicholson. in this encounter
 
 Discharge Instructions
 Nesha Vanegas RN - 2019Formatting of this note may be different from the original.
 
 Peripheral Angiography
 Peripheral angiography is an outpatient procedure that makes a   map  of the vessels (ar
teries) in your lower body, legs, and arms, using X-ray and dye.This map can show where bloo
d flow may be blocked.
 Talk with your healthcare provider about the risks and complications of angiography. 
 Before the procedure
 Prepare for the peripheral angiography as follows:
  Tell your healthcare provider about all medicines you take and any allergies you may hav
e.
  Follow any directions you  re given for not eating or drinking before the procedure. If
 your provider says to take your normal medicines, swallow them with only small sips of wate
r.
  Arrange for a family member or friend to drive you home.
 During the procedure
 Here is what to expect:
  
 You may get medicine through an IV (intravenous) line to relax you. You  re given an injec
tion to numb the insertion site. Then, a tiny skin cut (incision) is made near an artery in 
your groin.
  Your provider inserts a thin tube (catheter) through the incision. He or she then thread
s the catheter into an artery while looking at a video monitor.
  Contrast   dye  is injected into the catheter to confirm position. You may feel warmt
h or pressure in your legs and back. You lie still as X-rays are taken. The catheter is then
 taken out.
 After the procedure
 You  ll be taken to a recovery area. A healthcare provider will apply pressure to the site
 for about 10 minutes. Your healthcare provider will tell you how long to lie down and keep 
the insertion site still.Your healthcare provider will discuss the results with you soon a
fter the procedure.
 Back at home
 On the day you get home, don  t drive, don  t exercise, avoid walking and taking stairs, 
 
and avoid bending and lifting. Your healthcare provider may give you other care instructions
.
 Call your healthcare provider
 Call your healthcare provider right away if:
  You notice a lump or bleeding at the insertion site
  You feel pain at the insertion site
  You become lightheaded or dizzy
  You have leg pain or numbness
  You do not urinate in 8 hours 
 Date Last Reviewed: 2016-2018 [employer]. [address] [place] [address]. All righ
ts reserved. This information is not intended as a substitute for professional medical care.
 Always follow your healthcare professional's instructions.
 
 Peripheral Arterial Angioplasty and Stent
 
 Peripheral arterial angioplasty is a procedure done to treat a narrowed or blocked artery i
n your arm or leg. This brings blood flow back to your arm or leg. It also helps ease sympto
ms. Sometimes a metal mesh tube called a stent may be put in your artery. This holds your ar
dirk open. The procedure is done by a specially trained doctor called an interventional radi
ologist. This sylvia doctor trained and certified by the American Board of Radiology to use m
inimally invasive image-guided procedures to diagnose and treat diseases.
 Before your procedure
 Follow any instructions you are given on how to get ready. This can include following any d
irections you  re given for not eating or drinking before the procedure.
 Tell your healthcare provider if you:
  Are allergic to X-ray dye (contrast medium) or other medicines
  Are breastfeeding
  Are pregnant or think you may be pregnant
  Have had any recent illnesses
  Have any medical conditions
 Tell your healthcare provider about any medicines you are taking. You may need to stop taki
ng all or some of these before the procedure. This includes:
  All prescription medicines
  Herbs, vitamins, and other supplements
  Over-the-counter medicines such as aspirin or ibuprofen
  Street drugs
 During your procedure
  An IV line is put into your vein. This is to give you fluids and medicines. You may be g
iven medicine to help you relax and make you sleepy (sedation). Medicine will be put on your
 skin where the cut (incision) will be done. This is to keep you from feeling pain at the si
te.
  A very small incision is made at the insertion site. A thin, flexible tube (catheter) is
 put through the incision into your artery. The radiologist watches the catheter  s movemen
t on a screen.
  X-ray dye is injected through the catheter into your artery. This helps to see the arter
y more clearly on the X-rays. The radiologist uses these images as a guide. He or she moves 
the catheter to the narrowed or blocked part of your artery.
  When the catheter reaches the narrowed or blocked area, the radiologist inflates a speci
al balloon attached to the catheter. This is called angioplasty. Inflating the balloon widen
s the passage through the artery.
  Sometimes the artery won't stay open after the angioplasty. In this case, a stent is nee
ded. A catheter with a stent attached is threaded through the artery. When the stent is in t
he right place, it is opened.
  When the procedure is done, all catheters and balloons are removed. The stent stays in p
lace. Pressure is put on the insertion site for 15 minutes to stop bleeding.
 After your procedure
  Your healthcare provider will tell you how long to lie down and keep the insertion site 
still.
  You may stay in the hospital for a few hours or overnight.
 
  Drink plenty of fluids to help flush the X-ray dye from your body.
  After you go home, care for the insertion site as directed.
  You may need to take aspirin or a blood-thinning medicine (anticoagulant) after the proc
edure. This is to help prevent blood clots in the stent. Talk with your healthcare provider 
about this.
 Possible risks and complications
 All procedures have some risk. Possible risks of peripheral arterial angioplasty and stent 
include:
  Bleeding or infection at the catheter insertion site
  Damage to the artery, including worsening of the blockage
  Problems because of X-ray dye, these include allergic reaction or kidney damage
  Artery becomes blocked again (restenosis), which often happens within 6 to 18 months
  Low-level exposure to X-ray radiation, which is considered a safe level 
 Date Last Reviewed: 2017-2018 [employer]. [address] [place] [address], [place] [id number]. All righ
ts reserved. This information is not intended as a substitute for professional medical care.
 Always follow your healthcare professional's instructions.
 
 I certify that Cherie Villalobos  is under my care and that I had a face to face encounter on 
19  that meets the physician face to face encounter requirements.  I certify that based
 on my findings , the patient is in need of intermittent skilled services and a plan for fur
nishing these services to include, but not limited to the services listed in the questions b
elow:  
 Primary diagnosis for home health: PVD: wound care/ foot infection
 Additional diagnosis: 
 Services needed: wound care for right and left foot
 Patient is homebound because he/she cannot leave home without assistance of another individ
ual and leaving the home requires a considerable and taxing effort. Patient needs the assist
ance of another individual to leave home because:  Limited mobiltiy, fall risk, pain with mo
[facility]
 Physician assuming care for Home Health services: Ivy Couch
 
 in this encounter
 
 Medications at Time of Discharge
 
 
+----------------------+----------------------+----------+---------+----------+-----------+
| Medication           | Sig.                 | Disp.    | Refills | Start    | End Date  |
|                      |                      |          |         | Date     |           |
+----------------------+----------------------+----------+---------+----------+-----------+
|   albuterol          | Inhale 2 puffs into  |          |         |          |           |
| (PROVENTIL           | the lungs every 4    |          |         |          |           |
| HFA;VENTOLIN HFA)    | (four) hours as      |          |         |          |           |
| 108 (90 Base)        | needed for Wheezing. |          |         |          |           |
| MCG/ACT              |                      |          |         |          |           |
| inhalerIndications:  |                      |          |         |          |           |
| states uses 2x       |                      |          |         |          |           |
| monthly average      |                      |          |         |          |           |
+----------------------+----------------------+----------+---------+----------+-----------+
|   apixaban (ELIQUIS) | Take 1 tablet by     |   60     | 0       | 20 |           |
|  2.5 MG tablet       | mouth 2 (two) times  | tablet   |         | 18       |           |
|                      | daily.               |          |         |          |           |
+----------------------+----------------------+----------+---------+----------+-----------+
|   carvedilol (COREG) | Take 1 tablet by     |   60     | 0       | 20 |  |
|  12.5 MG tablet      | mouth 2 (two) times  | tablet   |         | 19       | 0         |
|                      | daily with meals.    |          |         |          |           |
+----------------------+----------------------+----------+---------+----------+-----------+
|   esomeprazole       | Take 1 capsule by    |          |         |          |           |
| (NEXIUM) 40 MG       | mouth every day      |          |         |          |           |
 
| capsule              |                      |          |         |          |           |
+----------------------+----------------------+----------+---------+----------+-----------+
|                      | Take 1 tablet by     |   30     | 0       | 20 |           |
| HYDROcodone-acetamin | mouth every 4 (four) | tablet   |         | 19       |           |
| ophen (NORCO) 5-325  |  hours as needed.    |          |         |          |           |
| MG per tablet        |                      |          |         |          |           |
+----------------------+----------------------+----------+---------+----------+-----------+
|   insulin aspart     | Inject  into the     |          |         |          |           |
| (NOVOLOG) 100        | skin 3 (three) times |          |         |          |           |
| UNIT/ML injection    |  daily before meals. |          |         |          |           |
|                      |  Sliding scale       |          |         |          |           |
+----------------------+----------------------+----------+---------+----------+-----------+
|   insulin degludec   | Inject 21 units      |          |         |          |           |
| (TRESIBA) 100        | every night          |          |         |          |           |
| UNIT/ML injection    |                      |          |         |          |           |
+----------------------+----------------------+----------+---------+----------+-----------+
|   isosorbide         | Take 1 tablet by     |   30     | 1       | 20 |  |
| mononitrate (IMDUR)  | mouth daily.         | tablet   |         | 18       | 9         |
| 60 MG 24 hr tablet   |                      |          |         |          |           |
+----------------------+----------------------+----------+---------+----------+-----------+
|   losartan (COZAAR)  | Take 100 mg by mouth |          |         | 20 |           |
| 100 MG tablet        |  daily.              |          |         | 19       |           |
+----------------------+----------------------+----------+---------+----------+-----------+
|   nitroGLYCERIN      | Place 1 tablet under |   30     | 0       | 20 |  |
| (NITROSTAT) 0.4 MG   |  the tongue every 5  | tablet   |         | 19       | 0         |
| SL tablet            | (five) minutes as    |          |         |          |           |
|                      | needed for Chest     |          |         |          |           |
|                      | pain.                |          |         |          |           |
+----------------------+----------------------+----------+---------+----------+-----------+
|   nortriptyline      | Take 25-75 mg by     |          |         |          |           |
| (PAMELOR) 25 MG      | mouth See Admin      |          |         |          |           |
| capsule              | Instructions. Takes  |          |         |          |           |
|                      | 25 mg by mouth every |          |         |          |           |
|                      |  morning and 75 mg   |          |         |          |           |
|                      | every night          |          |         |          |           |
+----------------------+----------------------+----------+---------+----------+-----------+
|   ondansetron        | Take 4 mg by mouth   |          |         | 20 |           |
| (ZOFRAN-ODT) 4 MG    | every 8 (eight)      |          |         | 18       |           |
| disintegrating       | hours as needed.     |          |         |          |           |
| tablet               |                      |          |         |          |           |
+----------------------+----------------------+----------+---------+----------+-----------+
|   oxybutynin         | Take 7.5 mg by mouth |          |         |          |           |
| (DITROPAN) 5 MG      |  2 (two) times       |          |         |          |           |
| tablet               | daily.               |          |         |          |           |
+----------------------+----------------------+----------+---------+----------+-----------+
|   prochlorperazine 5 | TAKE ONE TABLET BY   |   60     | 11      | 20 |           |
|  MG tablet           | MOUTH TWICE DAILY AS | tablet   |         | 19       |           |
|                      |  NEEDED FOR NAUSEA   |          |         |          |           |
+----------------------+----------------------+----------+---------+----------+-----------+
|   rOPINIRole         | Take 0.75 mg by      |          |         |          |           |
| (REQUIP) 0.25 MG     | mouth nightly.       |          |         |          |           |
| tablet               |                      |          |         |          |           |
+----------------------+----------------------+----------+---------+----------+-----------+
|   TRINTELLIX 20 MG   | Take 20 mg by mouth  |          |         | 20 |           |
| TABS                 | every evening.       |          |         | 19       |           |
+----------------------+----------------------+----------+---------+----------+-----------+
|   cefTAZidime        | Inject 2 g into the  |   1 each |         | 20 |  |
| (FORTAZ) 2 g         | muscle After         |          |         | 19       | 9         |
| injection            | Hemodialysis (see    |          |         |          |           |
|                      | admin instructions)  |          |         |          |           |
 
|                      | for 7 days.          |          |         |          |           |
+----------------------+----------------------+----------+---------+----------+-----------+
|   vancomycin         | Inject 750 mg into   |          | 0       | 20 |  |
| (VANCOCIN) IVPB      | the vein After       |          |         | 19       | 9         |
|                      | Hemodialysis (see    |          |         |          |           |
|                      | admin instructions)  |          |         |          |           |
|                      | for 7 days. Dilute   |          |         |          |           |
|                      | in appropriate       |          |         |          |           |
|                      | volume of IV fluid   |          |         |          |           |
|                      | and give by slow IV  |          |         |          |           |
|                      | infusion.            |          |         |          |           |
+----------------------+----------------------+----------+---------+----------+-----------+
|   albuterol          | Ventolin HFA 90      |          |         |          |  |
| (VENTOLIN HFA) 108   | mcg/actuation        |          |         |          | 9         |
| (90 Base) MCG/ACT    | aerosol inhaler      |          |         |          |           |
| inhaler              | Inhale 2 puffs every |          |         |          |           |
|                      |  4 hours by          |          |         |          |           |
|                      | inhalation route as  |          |         |          |           |
|                      | needed.              |          |         |          |           |
+----------------------+----------------------+----------+---------+----------+-----------+
|   atorvastatin       | Take 1 tablet by     |   30     | 0       | 20 |  |
| (LIPITOR) 40 MG      | mouth nightly.       | tablet   |         | 19       | 9         |
| tablet               |                      |          |         |          |           |
+----------------------+----------------------+----------+---------+----------+-----------+
|   bisacodyl          | Place 10 mg          |          |         |          |  |
| (DULCOLAX) 10 MG     | rectally.            |          |         |          | 9         |
| suppository          |                      |          |         |          |           |
+----------------------+----------------------+----------+---------+----------+-----------+
|   calcium acetate    | 667 mg 3 (three)     |          |         | 20 |  |
| (PHOSLO) 667 MG      | times daily with     |          |         | 16       | 9         |
| capsule              | meals.               |          |         |          |           |
+----------------------+----------------------+----------+---------+----------+-----------+
|   Cholecalciferol    | Take 5,000 capsules  |          |         |          |  |
| (VITAMIN D3) 5000    | by mouth every other |          |         |          | 9         |
| UNITS CAPS           |  day.                |          |         |          |           |
+----------------------+----------------------+----------+---------+----------+-----------+
|   clopidogrel        | Take 1 tablet by     |   30     | 11      | 20 |  |
| (PLAVIX) 75 MG       | mouth daily.         | tablet   |         | 18       | 9         |
| tablet               |                      |          |         |          |           |
+----------------------+----------------------+----------+---------+----------+-----------+
|   loperamide         | 2 mg as needed.      |          |         |          |  |
| (IMODIUM) 2 MG       |                      |          |         |          | 9         |
| capsule              |                      |          |         |          |           |
+----------------------+----------------------+----------+---------+----------+-----------+
|   LORazepam (ATIVAN) | Take 1 tablet by     |   30     | 0       | 20 | 02/15/201 |
|  1 MG tablet         | mouth every 8        | tablet   |         | 19       | 9         |
|                      | (eight) hours as     |          |         |          |           |
|                      | needed for Anxiety.  |          |         |          |           |
+----------------------+----------------------+----------+---------+----------+-----------+
|   Magnesium          | Take 1 tablet by     |          |         |          |  |
| Cl-Calcium Carbonate | mouth every other    |          |         |          | 9         |
|  (SLOW-MAG) 71.5-119 | day.                 |          |         |          |           |
|  MG TBEC             |                      |          |         |          |           |
+----------------------+----------------------+----------+---------+----------+-----------+
|   ranitidine         | ranitidine 150 mg    |          |         |          |  |
| (ZANTAC) 150 MG      | tablet Take 1 tablet |          |         |          | 9         |
| tablet               |  twice a day by oral |          |         |          |           |
|                      |  route.              |          |         |          |           |
+----------------------+----------------------+----------+---------+----------+-----------+
|   Vortioxetine HBr   | 10 mg nightly.       |          |         |          |  |
 
| 10 MG TABS           |                      |          |         |          | 9         |
+----------------------+----------------------+----------+---------+----------+-----------+
 as of this encounter
 
 Progress Notes
 Andrés Elliott MD - 2019  7:16 AM PSTFormatting of this note may be different from the 
original.
 
 
 CONSULT NOTE
 2019
 7:16 AM
 
 PRIMARY PHYSICIAN
 Ivy Couch
 
 CONSULT REQUEST FROM
 Moiz Josue MD
 
 HISTORY OF PRESENT ILLNESS
 The patient is a 68 y.o.-year-old female  with history of ESRD on HD via fistula Tue/Thu/Sa
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
 CABG. Recent LLE angioplasty She had been on antibiotics for L foot first digit infection w
ith possible osteomyelitis since November (Oral levofloxacin and clindamycin then transition
ed to IV cefepime with HD until 18, no improvement noted on antibiotics). She had follo
wed with podiatrist and underwent L TMA on 18, intraoperative findings encountered pur
ulence at first metatarsal.
 
 She was Admitted on 18 to [facility] and had resection of the toe.  discha
rged on 19She has problem with tremors on the hands And on and off confusion. Ct head ne
k[place]all on 18 EEG done show no seizure just diffuse slowing .
 The patient has some nausea and on and off chest pain stable. The patient felt better was
 discharged on 2019 
 
 readmitted on 19 and discharged on 19 due to chest pain and noted to be stable.
 
 
 Wound culture shows skin sherwin.She had 20days off iv vancomycin and 16 days of ceftazidime 
Had 4 days of cefepime.Orignally was to complete to iv vancmycin 500 mg iv and ceftazidime 2
 gm iv after each hemodialysis unitluntil 18 for the infected foot. Tbut was stopped as
 the line of resection was free of infecton . And off antibiotics since 19
 
 
 The patient developed new right foot lesion which occurred between  until 2019. Patient has memory problems does not know what happened but the right foot is swo
llen and red and tender will need to be taken care of as soon as possible cultures will be d
one. We will have MRSA screen CBC CMP ESR CRP blood culture  2
 
 cultures of the right foot done and right ankle done
 
 will have patient go to [facility] ER for evaluation of ischemic right foot versus severe infec
tion of the right fourth toe.
 
 Wound examination right leg with 21 x 5 cm abrasions ankle with open wound big toe right fo
ot with circumference of 10 cm with blister of 0.5 x 0.2 cm with redness of 5 cm. Second toe
 of the right foot with pallor of 4 x 2 cm.Left foot with amputation site healing well 11 x 
1 cm
 
 I have spoken to Dr. Srinivasan Jordan podiatrist will see her when the patient is admitted to 
[facility].
 
 
 Patient will go to the emergency room to see Dr. Saenz whom have coordinated her to be see
n there will have arterial studies of the right leg is concern for ischemic will be on vanco
mycin, ceftazidme
 
 And metronidazole. 
 ADmitted to [facility] on 19
 Due to concern with Right footinfection.
 infection, thus an infectious disease (ID) consult done for further
 evaluation and management.
 Day 3 of vancomycin, metronidazole and ceftazidime
 S/p  Ultrasound guided access of left common femoral artery
 Aortogram SElective catheterization of right common femoral artery with right leg runoff.Ri
ght SFA stenting using 6x80 mm self expanding stent
 Drug eluting balloon angioplasty of right SFA using 4x100 mm Lutonix balloon
 The patient feeling better today 
 Pending culture no growth will complete one more week o
 Ceftazidime and vancomycin 
 wound care with silver sulfadiazine cream. 
 Past Medical History 
 Diagnosis Date 
   Acute MI ([facility])  
  with stenting x 1 
   Anemia associated with chronic renal failure 2016 
   Atrial fibrillation ([facility])  
   Chronic kidney disease  
   Congestive heart failure ([facility])  
   COPD (chronic obstructive pulmonary disease) ([facility])  
   COPD (chronic obstructive pulmonary disease) ([facility]) 2018 
   Coronary artery disease  
  stent placements: 3 total 
   Depression  
   Diabetes other 
  abstracted 
   Diabetes mellitus, type 2 ([facility])  
   ESRD on peritoneal dialysis ([facility])  
   GERD (gastroesophageal reflux disease)  
   Hemodialysis patient ([facility]) 10/2016 
  Stopped peritoneal and restarted hemodialysis 
   Hemorrhage of gastrointestinal tract, unspecified 2017 
  low GI, rectal bleeding 
   High blood pressure other 
  abstracted 
   High cholesterol other 
  abstracted 
   Hyperlipidemia  
   Hypertension  
   Hyponatremia 2017 
   Myocardial infarction ([facility]) 2017 
   Other chronic pain  
  feet,  
   Pain of left hip joint 2016 
  due to hip fracture and replacement 
   Partial small bowel obstruction ([facility]) 2017 
  resolved 
   Renal failure  
  Stage 5.   Fistula in the JAN - not on dialysis yet. 
   Unspecified visual disturbance  
  glasses 
 
 
 Past Surgical History 
 Procedure Laterality Date 
   AV FISTULA PLACEMENT   
  left upper arm AV fistula - failed 
   AV FISTULA REPAIR N/A 10/25/2016 
  Procedure: AV FISTULA - GRAFT REPAIR/REVISION;  Surgeon: Kirt Mclaughlin MD;  Location: [facility]
IN OR;  Service: Vascular;  Laterality: N/A;  Superficialization and revision of left arm fi
stula 
   CARDIAC CATHETERIZATION  2017 
   CARPAL TUNNEL RELEASE Bilateral other 
  abstracted 
   CATARACT EXTRACTION Bilateral  
   CATHETER REMOVAL N/A 2016 
  Procedure: DIALYSIS CATHETER - REMOVAL;  Surgeon: Kirt Mclaughlin MD;  Location: [facility] OR; 
 Service: Vascular;  Laterality: N/A;  PD cath removal 
   CHOLECYSTECTOMY  other 
  abstracted 
   COLONOSCOPY   
   CORONARY ARTERY BYPASS GRAFT   
   CORONARY STENT PLACEMENT  2017 
  Drug Elutin stents to OM, 1 stent to prox. LAD 
   EYE SURGERY   
   FOOT AMPUTATION Left 2018 
  Procedure: FOREFOOT - AMPUTATION;  Surgeon: Srinivasan Jordan DPM;  Location: [facility] MAIN OR;
  Service: Podiatry;  Laterality: Left; 
   HARDWARE PRESENT   
  stent placements x 3, Left hip replacement, vascular stents 2 in left leg 
   HIP ARTHROPLASTY Left 2016 
  Procedure: HIP - ARTHROPLASTY(ALLISON);  Surgeon: Uriel Roa MD;  Location: [facility] MAIN 
OR;  Service: Orthopedics;  Laterality: Left; 
   HYSTERECTOMY  1998 
  complete 
   PACEMAKER INSERTION   
  [employer] pacemaker/defib 
   PERITONEAL CATHETER INSERTION  8/15/2013 
   PERITONEAL CATHETER INSERTION N/A 2016 
  Procedure: LAPAROSCOPIC - PERITONEAL DIALYSIS CATH INSERTION;  Surgeon: Kirt Mclaughlin MD;  Lo
cation: [facility] MAIN OR;  Service: Vascular;  Laterality: N/A;  Removal of old catheter 
   SHOULDER SURGERY Right  
  frozen shoulder 
   UNLISTED PROCEDURE ARTHROSCOPY   
  total Left hip 
   vascular stents Left 2017 
  leg (2 stents) 
   VASCULAR SURGERY   
 
 Current Facility-Administered Medications 
 Medication Dose Route Frequency Provider Last Rate Last Dose 
   acetaminophen (TYLENOL) tablet 650 mg  650 mg Oral Q6H PRN Luiza Rosales MD     
  Or 
   acetaminophen (TYLENOL) suppository 650 mg  650 mg Rectal Q6H PRN Luiza Rosales MD
     
   albuterol (PROVENTIL HFA;VENTOLIN HFA) inhaler  2 puff Inhalation Q4H PRN Luiza jhaveri MD     
   apixaban (ELIQUIS) tablet 2.5 mg  2.5 mg Oral BID Prudence Nicholson MD-R2   2.5 mg at 2051 
   aspirin EC tablet 81 mg  81 mg Oral Daily with breakfast Kirt Mclaughlin MD   81 mg at  1400 
   atorvastatin (LIPITOR) tablet 40 mg  40 mg Oral Nightly Luiza Rosales MD   40 mg a
t 19 
 
   calcium acetate (PHOSLO) capsule 667 mg  667 mg Oral TID  Luiza Rosales MD   667
 mg at 19 
   carvedilol (COREG) tablet 12.5 mg  12.5 mg Oral BID KAYLEE Rosales MD   12.5 mg 
at 19 
   cefTAZidime (FORTAZ) 2 g in sodium chloride (IV) 0.9 % 50 mL IVPB  2 g Intravenous Afte
r Hemodialysis (see admin instructions) Andrés Elliott MD     
   cholecalciferol (VITAMIN D-3) tablet 5,000 Units  5,000 Units Oral Daily Luiza rivas MD   5,000 Units at 19 1400 
   clopidogrel (PLAVIX) tablet 75 mg  75 mg Oral Daily Kirt Mclaughlin MD   75 mg at 19 1
401 
   dextrose 10 % infusion   Intravenous Continuous PRN Luiza Rosales MD     
   dextrose 50 % solution 12 mL  12 mL Intravenous PRN Luiza Rosales MD     
   dextrose 50 % solution 25 mL  25 mL Intravenous PRN Luiza Rosales MD     
   famotidine (PEPCID) tablet 10 mg  10 mg Oral Daily Luiza Rosales MD   10 mg at  1401 
   fentaNYL (SUBLIMAZE) injection 25 mcg  25 mcg Intravenous Q1H PRN Kirt Mclaughlin MD     
  Or 
   fentaNYL (SUBLIMAZE) injection 50 mcg  50 mcg Intravenous Q1H PRN Kirt Mclaughlin MD     
   glucagon (GLUCAGEN) injection 0.5 mg  0.5 mg Intramuscular PRN Luiza Rosales MD   
  
   glucagon (GLUCAGEN) injection 1 mg  1 mg Intramuscular PRN Luiza Rosales MD     
   HYDROcodone-acetaminophen (NORCO) 5-325 MG per tablet 1 tablet  1 tablet Oral Q4H PRN STEVEN Mclaughlin MD   1 tablet at 19 0626 
  Or 
   HYDROcodone-acetaminophen (NORCO)  MG per tablet 1 tablet  1 tablet Oral Q4H PRN 
Kirt Mclaughlin MD   1 tablet at 19 
   HYDROmorphone (DILAUDID) injection 0.5 mg  0.5 mg Intravenous Q3H PRN Luiza Rosales MD     
  Or 
   HYDROmorphone (DILAUDID) injection 1 mg  1 mg Intravenous Q3H PRN Luiza Rosales MD
     
   insulin glargine (LANTUS) injection 15 Units  15 Units Subcutaneous Nightly Luiza doran MD   15 Units at 19 
   insulin lispro (human) (HUMALOG) injection 0-3 Units  0-3 Units Subcutaneous Nightly 
leonel Rosales MD   1 Units at 19 
   insulin lispro (human) (HUMALOG) injection 0-6 Units  0-6 Units Subcutaneous TID AC She
kelley Rosales MD   1 Units at 19 171 
   isosorbide mononitrate (IMDUR) 24 hr tablet 60 mg  60 mg Oral Daily Luiza Rosales MD   60 mg at 19 1402 
   loperamide (IMODIUM) capsule 2 mg  2 mg Oral 4x Daily PRN Luiza Rosales MD     
   LORazepam (ATIVAN) tablet 1 mg  1 mg Oral Q8H PRN Luiza Rosales MD   1 mg at  0839 
   metroNIDAZOLE (FLAGYL) tablet 500 mg  500 mg Oral 3 times per day Andrés Elliott MD   500 
mg at 19 0557 
   nitroGLYCERIN (NITROSTAT) SL tablet 0.4 mg  0.4 mg Sublingual Q5 Min PRN Luiza rivas MD     
   nortriptyline (PAMELOR) capsule 25 mg  25 mg Oral QAM Kirt Mclaughlin MD   Stopped at  1007 
  And 
   nortriptyline (PAMELOR) capsule 75 mg  75 mg Oral Nightly Kirt Mclaughlin MD   75 mg at  
   ondansetron (ZOFRAN-ODT) disintegrating tablet 4 mg  4 mg Oral Q6H PRN Kirt Mclaughlin MD   
  
  Or 
   ondansetron (ZOFRAN) injection 4 mg  4 mg Intravenous Q6H PRN Kirt Mclaughlin MD   4 mg at 0
19 223 
   oxybutynin (DITROPAN) tablet 2.5 mg  2.5 mg Oral BID Luiza Rosales MD   2.5 mg at 
19 
   pantoprazole (PROTONIX) EC tablet 40 mg  40 mg Oral QAM AC Kirt Mclaughlin MD   40 mg at  0557 
 
   rOPINIRole (REQUIP) tablet 0.75 mg  0.75 mg Oral Nightly Luiza Rosales MD   0.75 m
g at 19 
   sodium chloride (PF) 0.9 % flush 10 mL  10 mL Intravenous Q8H Luiza Rosales MD   1
0 mL at 19 1130 
   vancomycin (VANCOCIN) 500 mg in sodium chloride (IV) 0.9 % 100 mL IVPB  500 mg Intraven
ous See Admin Instructions Beatriz Luciano, [facility]     
   Vortioxetine HBr TABS 10 mg  10 mg Oral Nightly Luiza Rosales MD   Stopped at 01/2
4/19 2200 
   zolpidem (AMBIEN) tablet 5 mg  5 mg Oral Nightly PRN Kirt Mclaughlin MD     
 
 Allergies 
 Allergen Reactions 
   Quinapril Hcl Anaphylaxis 
   Digoxin And Related Other (See Comments) 
   toxic 
   Metaxalone Other (See Comments) 
   Morphine Mental Changes 
   Make her crazy/ "funny feeling in my head"
 Has used hydromorphone in-house 
   Pantoprazole Sodium Nausea and Vomiting 
   Penicillins Rash 
   Quinapril Hcl Edema 
   Facial Edema 
   Sudafed [Pseudoephedrine Hcl] Rash 
 
 Social History 
 
 Social History 
   Marital status:  
   Spouse name: N/A 
   Number of children: N/A 
   Years of education: N/A 
 
 Occupational History 
   Not on file. 
 
 Social History Main Topics 
   Smoking status: Former Smoker 
   Packs/day: 1.00 
   Years: 30.00 
   Types: Cigarettes 
   Quit date: 2004 
   Smokeless tobacco: Never Used 
    Comment: quite smoking  
   Alcohol use No 
   Drug use: No 
   Sexual activity: No 
 
 Other Topics Concern 
   Not on file 
 
 Social History Narrative 
  She lives with . 2 kids. Worked in banking 
 
 No smoking. No alcohol intake. Recreational Drug Use
 No Travel
 NO Pets
 Immunization History 
 Administered Date(s) Administered 
   Hepatitis B Adult 2016 
 
   Influenza, Trivalent W/Preservative 2016 
   Pneumococcal Polysaccharide 23-valent 2012 
  
 
 Family History 
 Problem Relation Age of Onset 
   High cholesterol Father  
   Hypertension Father  
   Diabetes Paternal Grandmother  
   Malig hypertherm Neg Hx  
   Kidney disease Neg Hx  
 
 REVIEW OF SYSTEMS
 
 General: The patient noted to have no problem of fever,no weight loss
  and no chills.
 
 Neurologic: Denies any headache, any lightheadedness. No loss of
 
 consciousness or seizure.
 
 Cardiac: Denies Chest Pain, Palpitation, Dyspnea on Exertion
 
 Pulmonary: Denies cough, wheezing and hemoptysis
 
 GASTROINTESTINAL: Denies nausea vomiting, and diarrhea.
 GENITOURINARY: Noted no dysuria, urgency, or frequency.
 Hematological : Denies any ecchymosis, bleeding or hematuria
 
 Endocrine: Denies any polyphagia, polyuria or hot and cold intolerance
  Musculoskeletal: no new joint pain and swelling. 
 Skin : Denies any new rashes or cutaneous changes.
 
 Rest of the review of systems is unremarkable.
 
 PHYSICAL EXAMINATION
 
 VITAL SIGNS 
 Wt Readings from Last 3 Encounters: 
 19 65.5 kg (144 lb 6.4 oz) 
 19 65.5 kg (144 lb 6.4 oz) 
 18 68.7 kg (151 lb 7.3 oz) 
 
 Temp Readings from Last 3 Encounters: 
 19 98.2 F (36.8 C) (Oral) 
 19 97.7 F (36.5 C) (Axillary) 
 19 98.3 F (36.8 C) (Oral) 
 
 BP Readings from Last 3 Encounters: 
 19 105/58 
 19 108/56 
 01/10/19 115/54 
 
 Pulse Readings from Last 3 Encounters: 
 19 56 
 19 68 
 01/10/19 59 
 
 Head:  Normocephalic atraumatic
 
 
 HEENT: Pink palpebral conjunctivae. Anicteric sclerae. No
 tonsillopharyngeal congestion.
 
 Neck : Noted no  Cervical Lymphadenopathy
 
 CHEST:Clear Breath Sounds Bilateral 
 
 HEART: Regular rate and rhythm. No murmurs thrills or rubs
 
 ABDOMEN: Soft, flat. No tenderness. No hepatosplenomegaly.
 
 GROIN AREA: Negative  for intertrigo.
 
 EXTREMITIES: upper extremity no edema
 Left foot with dressing
 Right foot with wounds less pale and red.
  
 
 NEUROLOGIC: No localizing signs.
 
 Skin : with  ecchymosis. 
 
 LABORATORY DATA
 
 Lab Results 
 Component Value Date 
  WBC 3.90 2019 
  HGB 9.8 (L) 2019 
  HCT 30.1 (L) 2019 
   (L) 2019 
  CHOL 101 2017 
  TRIG 132 2017 
  HDL 34 (L) 2017 
  ALT 21 2019 
  AST 24 2019 
   2019 
  K 3.9 2019 
  CL 97 (L) 2019 
  CREATININE 4.8 (H) 2019 
  BUN 13 2019 
  CO2 31 2019 
  TSH 1.14 2017 
  INR 1.0 2018 
  GLUF 169 (H) 2019 
  HGBA1C 7.5 (H) 2018 
 
 Hepatic Function Panel:  
 Lab Results 
 Component Value Date 
  PROT 6.2 (L) 2019 
  ALB 2.7 (L) 2019 
  BILITOT 0.4 2019 
  BILIDIR 0.1 2017 
   2019 
  AST 24 2019 
  ALT 21 2019 
  
 
 Lipase: 
 Lab Results 
 
 Component Value Date 
  LIPASE 332 2017 
  
 U/A:  
 Lab Results 
 Component Value Date 
  COLORU YELLOW 2011 
  CLARITYU Slightly Cloudy 10/16/2018 
  MEROPENEM 1.009 2013 
  LEUKOCYTESUR  10/16/2018 
    Comment: 
    small 
  NITRITE Negative 10/16/2018 
  UROBILINOGEN Normal 10/16/2018 
  UPRO  10/16/2018 
    Comment: 
    100 
  UPRO 100 2013 
  PHUR 8 10/16/2018 
  BLOODU Negative 10/16/2018 
  KETONES negative 10/16/2018 
  BILIRUBINUR Negative 10/16/2018 
  GLUCOSEU  10/16/2018 
    Comment: 
    moderate 
  
 DIAGNOSTIC IMAGING
 
 CAT scan 
 
 Ultrasound
   
 X-ray Foot Left
 
 Result Date: 2018
 Amputation of the left forefoot at the level of the proximal metatarsals. Surgical cutaneou
s staples are present. No radiographic evidence for osteomyelitis. Normal alignment of the h
indfoot. Mild degeneration of the midfoot. Electronically signed by Oleksandr Lemus MD on 2018 10:12 AM
 
 Ct Head Without Contrast
 
 Result Date: 2018
 1.  No acute intracranial pathology. Electronically signed by Steven Samano MD on  5:28 PM
 
 X-ray Chest 1 View
 
 Result Date: 2019
 1.  Small loculated pleural fluid collection seen overlying the lateral left heart border i
n the lower left chest.  There may also be a small pleural effusion at the right lung base w
hich is layering posteriorly. 2.  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 Electronically s
igned by Eugenio Hoffman DO on 2019 4:59 PM
 
 Us Lower Extremty  Arterial Right
 
 Result Date: 2019
 1. Right leg shows biphasic inflow with a greater than 50% stenosis at the origin of the dobbins
perficial femoral artery (137-309).  Biphasic outflow. 2. Two vessel runoff to the right jeanne
t. Signed by: Eugenio Hoffman Sign Date/Time: 2019 3:11 PM
 
 
 Cherie Villalobos is a 68 y.o. female with the following Problems 
 Patient Active Problem List 
 Diagnosis 
   Proteinuria 
   Vitamin D deficiency 
   Secondary hyperparathyroidism (HCC) 
   Recurrent UTI 
   Coronary artery disease involving native coronary artery of native heart without angina
 pectoris 
   Depression 
   ESRD (end stage renal disease) (HCC) 
   Type 2 diabetes mellitus with chronic kidney disease on chronic dialysis, with long-ter
m current use of insulin (HCC) 
   Anemia in ESRD (end-stage renal disease) (HCC) 
   Hypertension, essential 
   Dyslipidemia 
   Gastroesophageal reflux disease without esophagitis 
   Diabetic foot ulcer (HCC) 
   Loss of weight 
   Dysphagia, unspecified 
   Foot ulcer due to DM  (HCC) 
   Severe protein-calorie malnutrition (HCC) 
   Osteomyelitis of left foot (HCC) 
   Pleural effusion 
   Prolonged Q-T interval on ECG 
   Chronic systolic congestive heart failure (HCC) 
   Chronic diarrhea 
   Chronic anticoagulation 
   Plantar ulcer (HCC) 
   Cardiomyopathy (HCC) 
   COPD (chronic obstructive pulmonary disease) (HCC) 
   ICD (implantable cardioverter-defibrillator) in place 
   Implantable defibrillator reprogramming/check 
   Mixed hyperlipidemia 
   Moderate protein-calorie malnutrition (HCC) 
   Paroxysmal atrial fibrillation (HCC) 
   S/P CABG x 2 
   S/P mitral valve repair 
   Steroid-induced hyperglycemia 
   Stress hyperglycemia 
   Chronic multifocal osteomyelitis of left foot (HCC) 
   PVD (peripheral vascular disease) (HCC) 
   CAD (coronary artery disease) 
   Chest pain 
 
 Plan:
 
 Silver sulfadiazene cream 2x day on the wound. 
 Follow up  Blood culture 2x on different site.
 Follow up  Gram Stain and Culture
 Continue with iv ceftazidime, vancomycin for one week 
 Follow up in one week.
 Discussed with Dr. Josue hospitalist  who agreed and will proceed
 As plan.
 Thank you very much for the consult.
 ______________________
 
 ______________________
 MD Jayson FRANKLIN Christopher D, [facility] - 2019 11:00 AM PSTFormatting of this note may be different
 from the original.
 Vancomycin Monitoring
 
 S:  Pharmacy to dose vancomycin per protocol.  Diagnosis:  Cellulitis.
 O: 
 Lab Results 
 Component Value Date/Time 
  CREATININE 6.8 (H) 2019 04:53 AM 
  WBC 3.39 (L) 2019 04:53 AM 
 
  Wt= 65.5 kg, CrCl= HD T,Th,S. Tmax afeb,
  Cultures= Bloodx2 pending.  Other Abx= ceftazidime, metronidazole. 
 A:  Trough goal:  10- 20 ug/mL
 P: Continue vancomycin per protocol. No levels needed at this time. Will continue to monito
r. 
 
 Pharmacist: Prudence Alcantar MD-R2 - 2019  6:55 AM PSTFormatting of this note may be different fr
om the original.
 [facility]
 Service: Hospitalist
 Resident Progress Note
 
 Hospital Day:  LOS: 1 day 
 Consultants: Treatment Team: 
 Consulting Physician: Srinivasan Jordan DPM
 Consulting Physician: Kirt Mclaughlin MD
 Consulting Physician: Ethan Awad MD
 Admitting Provider: Luiza Rosales MD
 SUBJECTIVE
 
      Patient Summary:  Per Dr. Rosales's H and P from 2019: '69 y/o F with h/o ESRD
 on HD T,TH,Sat (Dr. Asher), DM2, PAD s/p angioplasty of LLE and transmetatarsal amputation 
of left foot 18 by Dr. Jordan due to osteomyelitis, CAD, s/p MI, CABG, AVR , HFrE
F with last EF 20 to 25%, s/p AICD, AF on chronic anticoagulation who was sent to ED by Dr. Elliott for evaluation of right LE.  Patient reports that at HD yesterday it was noted that her
 right toes were red and dusky.  She went to see Dr. Elliott this morning and he was concerned 
that right toes could be ischemic or infected and referred to ED. '
 Vascular surgery was consulted from the emergency department, they did a selective catheter
ization of the left common femoral artery and placed a stent. The patient tolerated the proc
edure well.
      Events Overnight:  
  - No acute overnight events. Afebrile, vital signs stable. The patient reports her pain is
 markedly improved, she states the color is also better to her foot. She has had no other pa
in, no shortness of breath, no nausea or vomiting. Her last bowel movement was this morning.
 Medications: Fentanyl, Norco, Ativan, Versed  2
 
 Scheduled Medications   aspirin  81 mg Oral Daily with breakfast 
   atorvastatin  40 mg Oral Nightly 
   calcium acetate  667 mg Oral TID WC 
   carvedilol  12.5 mg Oral BID WC 
   cefTAZidime  2 g Intravenous After Hemodialysis (see admin instructions) 
   cholecalciferol  5,000 Units Oral Daily 
   clopidogrel  75 mg Oral Daily 
   famotidine  10 mg Oral Daily 
   insulin glargine  15 Units Subcutaneous Nightly 
   insulin lispro (human)  0-3 Units Subcutaneous Nightly 
   insulin lispro (human)  0-6 Units Subcutaneous TID AC 
 
   isosorbide mononitrate  60 mg Oral Daily 
   metroNIDAZOLE  500 mg Oral 3 times per day 
   nortriptyline  25 mg Oral QAM 
  And 
   nortriptyline  75 mg Oral Nightly 
   oxybutynin  2.5 mg Oral BID 
   pantoprazole  40 mg Oral QAM AC 
   rOPINIRole  0.75 mg Oral Nightly 
   sodium chloride (PF)  10 mL Intravenous Q8H 
   vancomycin  500 mg Intravenous See Admin Instructions 
   Vortioxetine HBr  10 mg Oral Nightly 
 
 Continuous Infusions 
   dextrose   
 
 PRN Medications
 acetaminophen **OR** acetaminophen, albuterol, dextrose, dextrose, dextrose, fentaNYL **OR*
* fentaNYL, glucagon, glucagon, HYDROcodone-acetaminophen **OR** HYDROcodone-acetaminophen, 
HYDROmorphone **OR** HYDROmorphone, loperamide, LORazepam, nitroGLYCERIN, ondansetron **OR**
 ondansetron, zolpidem
 
 OBJECTIVE
 Vital Signs:
 /56 (BP Location: Left leg)  | Pulse 77  | Temp 98.5 F (36.9 C) (Oral)  | Resp 16
  | Ht 1.778 m (5' 10")  | Wt 65.5 kg (144 lb 6.4 oz)  | SpO2 98%  | BMI 20.72 kg/m 
 
 Physical Exam
 
 General Appearance: Alert and cooperative, sitting up in bed receiving dialysis and appears
 to be in no acute distress. 
 
 HEENNT: Normocephalic and atraumatic. Hearing grossly intact. No nasal discharge. No trache
al deviation. 
 
 Cardiovascular: Rhythm and rate are regular. 2/6 systolic murmur heard best over the left u
pper sternal border. No gallops or rubs appreciated. Peripheral pulses 1+ on the right. No l
ower extremity edema.
 
 Pulmonary/Chest: Lungs are clear to auscultation bilaterally. Effort is normal. Normal jadon
th sounds. 
 
 Abdominal: Soft, nontender, nondistended. Normoactive bowel sounds. No guarding or rebound 
tenderness. 
 
 Musculoskeletal: Normal range of motion. Normal muscular development. Patient with forefoot
 amputation on the left. Left foot wrapped in dressing, C/D/I. Right foot with erythema over
 the great big toe and cyanosis over the second toe, patient stated this is improved from pr
ior. Her right foot is warm to the touch and has palpable dorsalis pedis pulse.
 
 Neurological: CN II-XII grossly intact. Oriented to person, place, and time. 
 
 Skin: Skin is warm and dry. No rash noted. 
 
 Psychiatric:The patient was able to demonstrate good judgement and reason, without hallucin
ations, abnormal affect or abnormal behaviors during the examination. 
 
 DATA
 
 Recent Labs
 Lab 19
 
 0453 19
 1543 19
 0554 19
 0847 
 WBC 3.39* 5.53 4.08 4.23 
 HGB 9.5* 9.4* 9.7* 8.5* 
 HCT 29.2* 28.4* 29.7* 26.0* 
 * 147* 125* 134* 
 NEUTOPHILPCT 66.95  --   --  74.02 
 MONOPCT 11.96  --   --  7.65 
 
 Recent Labs
 Lab 19
 0453 19
 1543 19
 0554 19
 0847 
  139 138 139 
 K 4.2 4.0 4.4 4.4 
  100 100 99 
 CO2 28 29 28 32 
 BUN 26* 23 15 24 
 CREATININE 6.8* 6.1* 4.9* 6.5* 
 ALB  --  2.7* 2.6* 2.2* 
 PROT  --  6.2* 6.1*  --  
 BILITOT  --  0.4 0.5  --  
 ALT  --    --  
 AST  --  24   --  
 
 Phosphorus:  
 Lab Results 
 Component Value Date 
  PHOS 4.4 2019 
 
 Recent Labs
 Lab 19
 1543 
 CKTOTAL 28* 
 
 Intake/Output Summary (Last 24 hours) at 19 0655
 Last data filed at 19 0341
  Gross per 24 hour 
 Intake              300 ml 
 Output                0 ml 
 Net              300 ml 
 
 Microbiology: Blood cultures pending
 
 Radiology
 Us Lower Extremty  Arterial Right
 
 Result Date: 2019
 1. Right leg shows biphasic inflow with a greater than 50% stenosis at the origin of the dobbins
perficial femoral artery (137-309).  Biphasic outflow. 2. Two vessel runoff to the right jeanne
t. Signed by: Eugenio Hoffman Sign Date/Time: 2019 3:11 PM
 
 PROBLEM LIST
 
 Principal Problem:
   PVD (peripheral vascular disease) ([facility])
 
 Active Problems:
   ESRD (end stage renal disease) ([facility])
   Type 2 diabetes mellitus with chronic kidney disease on chronic dialysis, with long-term 
current use of insulin ([facility])
   Anemia in ESRD (end-stage renal disease) ([facility])
   Hypertension, essential
   Chronic systolic congestive heart failure ([facility])
   COPD (chronic obstructive pulmonary disease) ([facility])
   Paroxysmal atrial fibrillation ([facility])
   CAD (coronary artery disease)
 Resolved Problems:
   * No resolved hospital problems. *
 
 ASSESSMENT & PLAN
 
 Patient Active Hospital Problem List:
  PVD (peripheral vascular disease) ([facility]) (2018)
   Assessment: Sent by ASAF Torrez, to the emergency department. Dr. Mclaughlin was consulted, IR 
angiogram yesterday. She was found to have mesenteric stenosis and PAD. Stent was placed in 
the left common iliac artery. She tolerated the catheterization well, and has improved vascu
lar blood flow to the right foot, although still with some cyanosis of the second toe.
   Plan: 
 Infectious disease consulting, appreciate recommendations
 IV Ceftazidime, vancomycin, metronidazole
 Vascular surgery consulting, appreciate recommendations
 Restarted apixaban (eliquis) today
 Fentanyl, Tylenol, Norco available prn for pain control
 Wound care consulted, appreciate recommendations
 Podiatry has also been consulted
 
  ESRD (end stage renal disease) ([facility]) (2016)
    Anemia in ESRD (end-stage renal disease) ([facility]) (2016)
 Assessment: With dialysis Tuesday, Thursday, Saturday, sees Dr. Asher clinic. She received 
her regular dialysis today without issue.
   Plan: 
 Nephrology consulting, appreciate recommendations.
 Continue home PhosLo, vitamin D
 She receives EPO injections with her dialysis
 
  Type 2 diabetes mellitus with chronic kidney disease on chronic dialysis, with long-term c
urrent use of insulin ([facility]) (2016)
   Assessment: The patient reports she normally takes 21 units of long-acting insulin at nig
ht with an insulin sliding scale at meals. Last hemoglobin A1c 7.5 percent in 2018.
 
   Plan: Lantus 15 units nightly
 Mild insulin sliding scale
 Aspirin, statin
 
  Hypertension, essential (2016)
   Assessment: Patient's blood pressure has been normal, and on the side of low during dialy
sis but with systolic blood pressure greater than 90.
   Plan: 
 Continue home Coreg, Imdur 
 
  Chronic systolic congestive heart failure ([facility]) (2018)
   Assessment: Last echo done 2018 showed severely impaired EF 20-25 percent, mild 
aortic regurgitation, moderate aortic stenosis, mild to moderate mitral regurgitation with m
oderate pulmonary hypertension. 
   Plan:
 Continue home Aspirin, statin, Coreg, Imdur
 
 
  COPD
 Assessment: The patient currently states that this is stable, she is currently satting well
 on room air with no shortness of breath.
 Plan: Albuterol prn
 
  CAD (coronary artery disease) (2018)
   Assessment: She has a history of chest pain with dialysis, however this has not happened 
during this hospitalization. Last catheterization 2018, CABG in 2018. 
   Plan: 
 Continue home Aspirin, statin, Imdur
 
 Continue home for Vortioxetine, nortriptyline, oxybutynin, pantoprazole
 Currently holding losartan
 
 Diet: Diabetic 
 DVT Prophylaxis: Eliquis
 Disposition: Inpatient
 Code Status:  Full Code
 
 Prudence Nicholson MD-R2
 2019
 6:55 AM
 
 
 Associated attestation - Moiz Josue MD - 2019  5:13 PM PSTPatient seen an
d examined along with residents. All labs and notes personally reviewed by me. Patient under
went vascular procedure yesterday with angioplasty and stenting of right SFA. She now has wa
rm right foot indicating resumption of circulation. Patient is on three antibiotics (vanc, F
lagyl and ceftazidime) by Dr. Elliott for right first and second toe cellulitis. No surgical in
tervention needed at this stage for right foot infection. Appreciate help of Dr. Mclaughlin, Dr. Norma clemente and Dr. Jordan. 
 I agree with the progress note of Dr. Nicholson. Abigail Morgan, [facility] - 2019  8:10 PM 
PSTFormatting of this note may be different from the original.
 Clinical Pharmacy Note: Renal Monitoring
 Cherie Villalobos 68 y.o. female
 Ht Readings from Last 1 Encounters: 
 19 1.778 m (5' 10") 
   
 Wt Readings from Last 1 Encounters: 
 19 65.5 kg (144 lb 6.4 oz) 
  
 Serum creatinine: 6.1 mg/dL (H) 19 1543
 Estimated creatinine clearance: 9 mL/min (A)
 Patient has h/o ESRD on HD qTTS
 
 Pharmacy dosing for renal function per Dr. Luiza Rosales.
 
 Currently, there are no medications needing to be adjusted.
 
 Pharmacy will continue to monitor and make changes as needed per renal function. 
 
 Abigail Morgan, [facility]
 PGY-1 Pharmacy Resident
 in this encounter
 
 Plan of Treatment
 
 
+--------+---------+-----------+----------------------+-------------+
 
| Date   | Type    | Specialty | Care Team            | Description |
+--------+---------+-----------+----------------------+-------------+
| 04/10/ | Office  | Podiatry  |   Srinivasan Jordan,  |             |
| 2019   | Visit   |           | DPM  [address] [facility], |             |
|        |         |           |  [address]  [place],  |             |
|        |         |           | [place] [address]             |             |
|        |         |           | [phone]         |             |
|        |         |           | [phone] (Fax)   |             |
+--------+---------+-----------+----------------------+-------------+
 as of this encounter
 
 Procedures
 
 
+----------------------+--------+-------------+----------------------+----------------------
+
| Procedure Name       | Priori | Date/Time   | Associated Diagnosis | Comments             
|
|                      | ty     |             |                      |                      
|
+----------------------+--------+-------------+----------------------+----------------------
+
| POCT GLUCOSE         | Routin | 2019  |                      |   Results for this   
|
|                      | e      | 12:10 PM    |                      | procedure are in the 
|
|                      |        | PST         |                      |  results section.    
|
+----------------------+--------+-------------+----------------------+----------------------
+
| POCT GLUCOSE         | Routin | 2019  |                      |   Results for this   
|
|                      | e      |  5:55 AM    |                      | procedure are in the 
|
|                      |        | PST         |                      |  results section.    
|
+----------------------+--------+-------------+----------------------+----------------------
+
| CBC W/AUTO DIFF      | Routin | 2019  |                      |   Results for this   
|
| (REFLEX TO MANUAL)   | e      |  5:38 AM    |                      | procedure are in the 
|
|                      |        | PST         |                      |  results section.    
|
+----------------------+--------+-------------+----------------------+----------------------
+
| PHOSPHOROUS          | Routin | 2019  |                      |   Results for this   
|
|                      | e      |  5:38 AM    |                      | procedure are in the 
|
|                      |        | PST         |                      |  results section.    
|
+----------------------+--------+-------------+----------------------+----------------------
+
| MAGNESIUM            | Routin | 2019  |                      |   Results for this   
|
|                      | e      |  5:38 AM    |                      | procedure are in the 
|
|                      |        | PST         |                      |  results section.    
|
 
+----------------------+--------+-------------+----------------------+----------------------
+
| BASIC METABOLIC      | Routin | 2019  |                      |   Results for this   
|
| PANEL                | e      |  5:38 AM    |                      | procedure are in the 
|
|                      |        | PST         |                      |  results section.    
|
+----------------------+--------+-------------+----------------------+----------------------
+
| POCT GLUCOSE         | Routin | 2019  |                      |   Results for this   
|
|                      | e      |  9:29 PM    |                      | procedure are in the 
|
|                      |        | PST         |                      |  results section.    
|
+----------------------+--------+-------------+----------------------+----------------------
+
| POCT GLUCOSE         | Routin | 2019  |                      |   Results for this   
|
|                      | e      |  4:06 PM    |                      | procedure are in the 
|
|                      |        | PST         |                      |  results section.    
|
+----------------------+--------+-------------+----------------------+----------------------
+
| POCT GLUCOSE         | Routin | 2019  |                      |   Results for this   
|
|                      | e      | 12:13 PM    |                      | procedure are in the 
|
|                      |        | PST         |                      |  results section.    
|
+----------------------+--------+-------------+----------------------+----------------------
+
| EKG STANDARD 12 LEAD | Routin | 2019  |                      |   Results for this   
|
|                      | e      |  6:18 AM    |                      | procedure are in the 
|
|                      |        | PST         |                      |  results section.    
|
+----------------------+--------+-------------+----------------------+----------------------
+
| POCT GLUCOSE         | Routin | 2019  |                      |   Results for this   
|
|                      | e      |  5:21 AM    |                      | procedure are in the 
|
|                      |        | PST         |                      |  results section.    
|
+----------------------+--------+-------------+----------------------+----------------------
+
| CBC W/AUTO DIFF      | Routin | 2019  |                      |   Results for this   
|
| (REFLEX TO MANUAL)   | e - AM |  4:53 AM    |                      | procedure are in the 
|
|                      |        | PST         |                      |  results section.    
|
+----------------------+--------+-------------+----------------------+----------------------
+
| BASIC METABOLIC      | Routin | 2019  |                      |   Results for this   
|
 
| PANEL                | e - AM |  4:53 AM    |                      | procedure are in the 
|
|                      |        | PST         |                      |  results section.    
|
+----------------------+--------+-------------+----------------------+----------------------
+
| POCT GLUCOSE         | Routin | 2019  |                      |   Results for this   
|
|                      | e      |  9:00 PM    |                      | procedure are in the 
|
|                      |        | PST         |                      |  results section.    
|
+----------------------+--------+-------------+----------------------+----------------------
+
| IR STENT FEMORAL     | Routin | 2019  |                      |   Results for this   
|
| POPLITEAL            | e      |  6:45 PM    |                      | procedure are in the 
|
|                      |        | PST         |                      |  results section.    
|
+----------------------+--------+-------------+----------------------+----------------------
+
| IR ANGIOGRAM         | Routin | 2019  |                      |   Results for this   
|
| EXTREMITY RIGHT      | e      |  6:45 PM    |                      | procedure are in the 
|
|                      |        | PST         |                      |  results section.    
|
+----------------------+--------+-------------+----------------------+----------------------
+
| IR AORTAGRAM         | Routin | 2019  |                      |   Results for this   
|
| ABDOMINAL            | e      |  6:45 PM    |                      | procedure are in the 
|
| SERIALOGRAM          |        | PST         |                      |  results section.    
|
+----------------------+--------+-------------+----------------------+----------------------
+
| BLOOD CULTURE, SET 2 | Timed  | 2019  |                      |   Results for this   
|
|                      |        |  6:05 PM    |                      | procedure are in the 
|
|                      |        | PST         |                      |  results section.    
|
+----------------------+--------+-------------+----------------------+----------------------
+
| IR GUIDANCE VASCULAR | Routin | 2019  |                      |   Results for this   
|
|  ACCESS US           | e      |  5:40 PM    |                      | procedure are in the 
|
|                      |        | PST         |                      |  results section.    
|
+----------------------+--------+-------------+----------------------+----------------------
+
| BLOOD CULTURE, SET 1 | Timed  | 2019  |                      |   Results for this   
|
|                      |        |  3:43 PM    |                      | procedure are in the 
|
|                      |        | PST         |                      |  results section.    
|
 
+----------------------+--------+-------------+----------------------+----------------------
+
| SEDIMENTATION RATE,  | STAT   | 2019  |                      |   Results for this   
|
| AUTOMATED            |        |  3:43 PM    |                      | procedure are in the 
|
|                      |        | PST         |                      |  results section.    
|
+----------------------+--------+-------------+----------------------+----------------------
+
| CBC W/MANUAL DIFF    | STAT   | 2019  |                      |   Results for this   
|
|                      |        |  3:43 PM    |                      | procedure are in the 
|
|                      |        | PST         |                      |  results section.    
|
+----------------------+--------+-------------+----------------------+----------------------
+
| C-REACTIVE PROTEIN   | STAT   | 2019  |                      |   Results for this   
|
|                      |        |  3:43 PM    |                      | procedure are in the 
|
|                      |        | PST         |                      |  results section.    
|
+----------------------+--------+-------------+----------------------+----------------------
+
| CK                   | STAT   | 2019  |                      |   Results for this   
|
|                      |        |  3:43 PM    |                      | procedure are in the 
|
|                      |        | PST         |                      |  results section.    
|
+----------------------+--------+-------------+----------------------+----------------------
+
| COMPREHENSIVE        | STAT   | 2019  |                      |   Results for this   
|
| METABOLIC PANEL      |        |  3:43 PM    |                      | procedure are in the 
|
|                      |        | PST         |                      |  results section.    
|
+----------------------+--------+-------------+----------------------+----------------------
+
| US LOWER EXTREMITY   | STAT   | 2019  |                      |   Results for this   
|
| ARTERIAL RIGHT       |        |  2:23 PM    |                      | procedure are in the 
|
|                      |        | PST         |                      |  results section.    
|
+----------------------+--------+-------------+----------------------+----------------------
+
| ED INFORMATION       | Routin | 2019  |                      |   Results for this   
|
| EXCHANGE             | e      |  1:03 PM    |                      | procedure are in the 
|
|                      |        | PST         |                      |  results section.    
|
+----------------------+--------+-------------+----------------------+----------------------
+
 in this encounter
 
 
 Results
 POCT glucose (2019 12:10 PM)
 
+--------------------+--------------------------+---------------+-----------------+
| Component          | Value                    | Ref Range     | Performed At    |
+--------------------+--------------------------+---------------+-----------------+
| GLUCOSE,POC SCREEN | 237 (H)Comment: Testing  | 65 - 99 mg/dL | [facility] |
|                    | performed at [facility];[address]     |               |                 |
|                    | [address] [facility];[place], [place]   |               |                 |
|                    | [address]                    |               |                 |
+--------------------+--------------------------+---------------+-----------------+
 
 
 
+-------------------+------------------+--------------------+--------------+
| Performing        | Address          | City/State/Zipcode | Phone Number |
| Organization      |                  |                    |              |
+-------------------+------------------+--------------------+--------------+
|   [facility] |   [address] | [place] [address] |              |
+-------------------+------------------+--------------------+--------------+
 POCT glucose (2019  5:55 AM)
 
+--------------------+--------------------------+---------------+-----------------+
| Component          | Value                    | Ref Range     | Performed At    |
+--------------------+--------------------------+---------------+-----------------+
| GLUCOSE,POC SCREEN | 135 (H)Comment: Testing  | 65 - 99 mg/dL | [facility] |
|                    | performed at [facility] [place];[address]     |               |                 |
|                    | [address];[place]   |               |                 |
|                    | [address]                    |               |                 |
+--------------------+--------------------------+---------------+-----------------+
 
 
 
+-------------------+------------------+--------------------+--------------+
| Performing        | Address          | City/State/Zipcode | Phone Number |
| Organization      |                  |                    |              |
+-------------------+------------------+--------------------+--------------+
|   [facility] |   [address] | [place] [address] |              |
+-------------------+------------------+--------------------+--------------+
 Phosphorus (2019  5:38 AM)
 
+------------+--------------------------+-----------------+--------------+
| Component  | Value                    | Ref Range       | Performed At |
+------------+--------------------------+-----------------+--------------+
| PHOSPHORUS | 3.6Comment: Testing      | 2.3 - 4.8 mg/dL | [facility]   |
|            | performed at [facility], [address] |                 | LABORATORY   |
|            |  [address],        |                 |              |
|            | [place]  [address]     |                 |              |
+------------+--------------------------+-----------------+--------------+
 
 
 
+----------+
| Specimen |
+----------+
| Blood    |
+----------+
 
 
 
 
+---------------+-------------------------+---------------------+----------------+
| Performing    | Address                 | City/State/Zipcode  | Phone Number   |
| Organization  |                         |                     |                |
+---------------+-------------------------+---------------------+----------------+
|   [facility]  |   [address]  | [place] [address] |   [phone] |
| LABORATORY    | [address]                   |                     |                |
+---------------+-------------------------+---------------------+----------------+
 Magnesium (2019  5:38 AM)
 
+-----------+--------------------------+-----------------+--------------+
| Component | Value                    | Ref Range       | Performed At |
+-----------+--------------------------+-----------------+--------------+
| MAGNESIUM | 2.3Comment: Testing      | 1.7 - 2.4 mg/dL | [facility]   |
|           | performed at [facility], [address] |                 | LABORATORY   |
|           |  [facility],        |                 |              |
|           | [place]  [address]     |                 |              |
+-----------+--------------------------+-----------------+--------------+
 
 
 
+----------+
| Specimen |
+----------+
| Blood    |
+----------+
 
 
 
+---------------+-------------------------+---------------------+----------------+
| Performing    | Address                 | City/State/Zipcode  | Phone Number   |
| Organization  |                         |                     |                |
+---------------+-------------------------+---------------------+----------------+
|   [facility]  |   [address]  | [place] [address] |   [phone] |
| LABORATORY    | [address]                   |                     |                |
+---------------+-------------------------+---------------------+----------------+
 Basic metabolic panel (2019  5:38 AM)
 
+----------------+--------------------------+-------------------+--------------+
| Component      | Value                    | Ref Range         | Performed At |
+----------------+--------------------------+-------------------+--------------+
| SODIUM         | 139                      | 135 - 145 mmol/L  | [facility]   |
|                |                          |                   | LABORATORY   |
+----------------+--------------------------+-------------------+--------------+
| POTASSIUM      | 3.9                      | 3.5 - 4.9 mmol/L  | [facility]   |
|                |                          |                   | LABORATORY   |
+----------------+--------------------------+-------------------+--------------+
| CHLORIDE       | 97 (L)                   | 99 - 109 mmol/L   | [facility]   |
|                |                          |                   | LABORATORY   |
+----------------+--------------------------+-------------------+--------------+
| CO2            | 31                       | 23 - 32 mmol/L    | [facility]   |
|                |                          |                   | LABORATORY   |
+----------------+--------------------------+-------------------+--------------+
| ANION GAP AGAP | 15                       | 5 - 20 mmol/L     | [facility]   |
|                |                          |                   | LABORATORY   |
+----------------+--------------------------+-------------------+--------------+
| GLUCOSE        | 169 (H)                  | 65 - 99 mg/dL     | [facility]   |
|                |                          |                   | LABORATORY   |
+----------------+--------------------------+-------------------+--------------+
| BUN            | 13                       | 8 - 25 mg/dL      | [facility]   |
|                |                          |                   | LABORATORY   |
 
+----------------+--------------------------+-------------------+--------------+
| CREATININE     | 4.8 (H)                  | 0.50 - 1.00 mg/dL | [facility]   |
|                |                          |                   | LABORATORY   |
+----------------+--------------------------+-------------------+--------------+
| BUN/CREAT      | 3                        |                   | [facility]   |
|                |                          |                   | LABORATORY   |
+----------------+--------------------------+-------------------+--------------+
| CALCIUM        | 9.4                      | 8.5 - 10.5 mg/dL  | [facility]   |
|                |                          |                   | LABORATORY   |
+----------------+--------------------------+-------------------+--------------+
| EGFR           | 9 (L)Comment: GFR <60:   | >60 mL/min/1.73m2 | [facility]   |
|                | CHRONIC KIDNEY DISEASE,  |                   | LABORATORY   |
|                | IF FOUND OVER A 3 MONTH  |                   |              |
|                | PERIOD.GFR <15: KIDNEY   |                   |              |
|                | FAILURE.FOR       |                   |              |
|                | [employer], MULTIPLY THE  |                   |              |
|                | CALCULATED GFR BY        |                   |              |
|                | 1.210.This eGFR is       |                   |              |
|                | calculated using the     |                   |              |
|                | MDRD IDMS traceable      |                   |              |
|                | equation.Testing         |                   |              |
|                | performed at [facility], [address] |                   |              |
|                |  [facility],        |                   |              |
|                | [place]    [address]   |                   |              |
+----------------+--------------------------+-------------------+--------------+
 
 
 
+----------+
| Specimen |
+----------+
| Blood    |
+----------+
 
 
 
+---------------+-------------------------+---------------------+----------------+
| Performing    | Address                 | City/State/Zipcode  | Phone Number   |
| Organization  |                         |                     |                |
+---------------+-------------------------+---------------------+----------------+
|   [facility]  |   [address]  | [place] [address] |   [phone] |
| LABORATORY    | [address]                   |                     |                |
+---------------+-------------------------+---------------------+----------------+
 CBC w/auto diff (reflex to manual) (2019  5:38 AM)
 
+----------------------+--------------------------------------------------------------------
-----+-------------------+--------------+
| Component            | Value                                                              
     | Ref Range         | Performed At |
+----------------------+--------------------------------------------------------------------
-----+-------------------+--------------+
| WBC                  | 3.90                                                               
     | 3.80 - 11.00 K/uL | [facility]   |
|                      |                                                                    
     |                   | LABORATORY   |
+----------------------+--------------------------------------------------------------------
-----+-------------------+--------------+
| RBC                  | 2.87 (L)                                                           
     | 3.70 - 5.10 M/uL  | [facility]   |
|                      |                                                                    
 
     |                   | LABORATORY   |
+----------------------+--------------------------------------------------------------------
-----+-------------------+--------------+
| HGB                  | 9.8 (L)                                                            
     | 11.3 - 15.5 g/dL  | [facility]   |
|                      |                                                                    
     |                   | LABORATORY   |
+----------------------+--------------------------------------------------------------------
-----+-------------------+--------------+
| HCT                  | 30.1 (L)                                                           
     | 34.0 - 46.0 %     | [facility]   |
|                      |                                                                    
     |                   | LABORATORY   |
+----------------------+--------------------------------------------------------------------
-----+-------------------+--------------+
| MCV                  | 105.1 (H)                                                          
     | 80.0 - 100.0 fl   | [facility]   |
|                      |                                                                    
     |                   | LABORATORY   |
+----------------------+--------------------------------------------------------------------
-----+-------------------+--------------+
| MCH                  | 34.3 (H)                                                           
     | 27.0 - 34.0 pg    | [facility]   |
|                      |                                                                    
     |                   | LABORATORY   |
+----------------------+--------------------------------------------------------------------
-----+-------------------+--------------+
| MCHC                 | 32.7                                                               
     | 32.0 - 35.5 g/dL  | [facility]   |
|                      |                                                                    
     |                   | LABORATORY   |
+----------------------+--------------------------------------------------------------------
-----+-------------------+--------------+
| RDW SD               | 61.7 (H)                                                           
     | 37 - 53 fl        | [facility]   |
|                      |                                                                    
     |                   | LABORATORY   |
+----------------------+--------------------------------------------------------------------
-----+-------------------+--------------+
| PLT                  | 137 (L)                                                            
     | 150 - 400 K/uL    | [facility]   |
|                      |                                                                    
     |                   | LABORATORY   |
+----------------------+--------------------------------------------------------------------
-----+-------------------+--------------+
| MPV                  | 9.5                                                                
     | fl                | [facility]   |
|                      |                                                                    
     |                   | LABORATORY   |
+----------------------+--------------------------------------------------------------------
-----+-------------------+--------------+
| DIFF TYPE            | MANUAL                                                             
     |                   | [facility]   |
|                      |                                                                    
     |                   | LABORATORY   |
+----------------------+--------------------------------------------------------------------
-----+-------------------+--------------+
| Neutrophils Manual   | 63                                                                 
     | %                 | [facility]   |
|                      |                                                                    
 
     |                   | LABORATORY   |
+----------------------+--------------------------------------------------------------------
-----+-------------------+--------------+
| Lymphocytes Manual   | 30                                                                 
     | %                 | [facility]   |
|                      |                                                                    
     |                   | LABORATORY   |
+----------------------+--------------------------------------------------------------------
-----+-------------------+--------------+
| Monocytes Manual     | 7                                                                  
     | %                 | [facility]   |
|                      |                                                                    
     |                   | LABORATORY   |
+----------------------+--------------------------------------------------------------------
-----+-------------------+--------------+
| Neutrophils Absolute | 2.46                                                               
     | 1.90 - 7.40 K/uL  | [facility]   |
|                      |                                                                    
     |                   | LABORATORY   |
+----------------------+--------------------------------------------------------------------
-----+-------------------+--------------+
| Lymphocytes Absolute | 1.17                                                               
     | 1.00 - 3.90 K/uL  | [facility]   |
|                      |                                                                    
     |                   | LABORATORY   |
+----------------------+--------------------------------------------------------------------
-----+-------------------+--------------+
| Monocytes Absolute   | 0.27                                                               
     | 0.00 - 0.80 K/uL  | [facility]   |
|                      |                                                                    
     |                   | LABORATORY   |
+----------------------+--------------------------------------------------------------------
-----+-------------------+--------------+
| MORPHOLOGY           | 1+Comment:                                                         
     |                   | [facility]   |
|                      | ANISO1+HYPONORMAL PLT                                              
     |                   | LABORATORY   |
|                      | MORPHTesting performed                                             
     |                   |              |
|                      | at [facility], [address]                                                     
     |                   |              |
|                      | [facility],                                                   
     |                   |              |
|                      | [place]    [address]                                             
     |                   |              |
|                      |Testing performed at [facility], [address] [facility], [place], [place]    [phone] |                   |              |
|                      |                                                                    
     |                   |              |
+----------------------+--------------------------------------------------------------------
-----+-------------------+--------------+
 
 
 
+----------+
| Specimen |
+----------+
| Blood    |
+----------+
 
 
 
 
+---------------+-------------------------+---------------------+----------------+
| Performing    | Address                 | City/State/Zipcode  | Phone Number   |
| Organization  |                         |                     |                |
+---------------+-------------------------+---------------------+----------------+
|   [facility]  |   [address]  | [place] [address] |   [phone] |
| LABORATORY    | [address]                   |                     |                |
+---------------+-------------------------+---------------------+----------------+
 POCT glucose (2019  9:29 PM)
 
+--------------------+--------------------------+---------------+-----------------+
| Component          | Value                    | Ref Range     | Performed At    |
+--------------------+--------------------------+---------------+-----------------+
| GLUCOSE,POC SCREEN | 262 (H)Comment: Testing  | 65 - 99 mg/dL | [facility]AT[facility] |
|                    | performed at [facility];[address]     |               |                 |
|                    | [address];[place]   |               |                 |
|                    | [id number]                    |               |                 |
+--------------------+--------------------------+---------------+-----------------+
 
 
 
+-------------------+------------------+--------------------+--------------+
| Performing        | Address          | City/State/Zipcode | Phone Number |
| Organization      |                  |                    |              |
+-------------------+------------------+--------------------+--------------+
|   [facility] LAB[facility] |   [address] | [place] [address] |              |
+-------------------+------------------+--------------------+--------------+
 POCT glucose (2019  4:06 PM)
 
+--------------------+--------------------------+---------------+-----------------+
| Component          | Value                    | Ref Range     | Performed At    |
+--------------------+--------------------------+---------------+-----------------+
| GLUCOSE,POC SCREEN | 193 (H)Comment: Testing  | 65 - 99 mg/dL | [facility] |
|                    | performed at [facility];[address]     |               |                 |
|                    | [address];[place]   |               |                 |
|                    | [address]                    |               |                 |
+--------------------+--------------------------+---------------+-----------------+
 
 
 
+-------------------+------------------+--------------------+--------------+
| Performing        | Address          | City/State/Zipcode | Phone Number |
| Organization      |                  |                    |              |
+-------------------+------------------+--------------------+--------------+
|   [facility] LAB[facility] |   [address] | [facility] [place] [address] |              |
+-------------------+------------------+--------------------+--------------+
 POCT glucose (2019 12:13 PM)
 
+--------------------+--------------------------+---------------+-----------------+
| Component          | Value                    | Ref Range     | Performed At    |
+--------------------+--------------------------+---------------+-----------------+
| GLUCOSE,POC SCREEN | 145 (H)Comment: Testing  | 65 - 99 mg/dL | [facility] |
|                    | performed at [facility];[address]     |               |                 |
|                    | [address];[place]   |               |                 |
|                    | [address]                    |               |                 |
+--------------------+--------------------------+---------------+-----------------+
 
 
 
 
+-------------------+------------------+--------------------+--------------+
| Performing        | Address          | City/State/Zipcode | Phone Number |
| Organization      |                  |                    |              |
+-------------------+------------------+--------------------+--------------+
|   [facility] LAB[facility] |   [address] | [place] [address] |              |
+-------------------+------------------+--------------------+--------------+
 EKG STANDARD 12 LEAD (2019  6:18 AM)
 
+-------------------+--------------------------+-----------+--------------+
| Component         | Value                    | Ref Range | Performed At |
+-------------------+--------------------------+-----------+--------------+
| Ventricular Rate  | 73                       | BPM       | [facility] EKG     |
+-------------------+--------------------------+-----------+--------------+
| Atrial Rate       | 73                       | BPM       | [facility]     |
+-------------------+--------------------------+-----------+--------------+
| P-R Interval      | 146                      | ms        | [facility]     |
+-------------------+--------------------------+-----------+--------------+
| QRS Duration      | 128                      | ms        | [facility]     |
+-------------------+--------------------------+-----------+--------------+
| Q-T Interval      | 464                      | ms        | [facility] EKG     |
+-------------------+--------------------------+-----------+--------------+
| QTC Calculation   | 511                      | ms        | [facility]     |
| (Bezet)           |                          |           |              |
+-------------------+--------------------------+-----------+--------------+
| Calculated P Axis | 70                       | degrees   | [facility] EKG     |
+-------------------+--------------------------+-----------+--------------+
| Calculated R Axis | -38                      | degrees   | [facility] EKG     |
+-------------------+--------------------------+-----------+--------------+
| Calculated T Axis | 9                        | degrees   | [facility] E[facility]     |
+-------------------+--------------------------+-----------+--------------+
| Diagnosis         | Normal sinus             |           | [facility] E[facility]     |
|                   | rhythmPossible Left      |           |              |
|                   | atrial enlargementLeft   |           |              |
|                   | axis                     |           |              |
|                   | deviationNon-specific    |           |              |
|                   | intra-ventricular        |           |              |
|                   | conduction blockCannot   |           |              |
|                   | rule out Septal infarct  |           |              |
|                   | , age                    |           |              |
|                   | undeterminedAbnormal     |           |              |
|                   | ECGWhen compared with    |           |              |
|                   | ECG of 2019       |           |              |
|                   | 05:50,Minimal criteria   |           |              |
|                   | for Septal infarct are   |           |              |
|                   | now PresentConfirmed by  |           |              |
|                   | EN FRANK ([id number]) on   |           |              |
|                   | 2019 12:03:10 PM    |           |              |
+-------------------+--------------------------+-----------+--------------+
 
 
 
+--------------+-------------------+--------------------+--------------+
| Performing   | Address           | City[facility]State[facility] | Phone Number |
| Organization |                   |                    |              |
+--------------+-------------------+--------------------+--------------+
|   [facility]   |   [address] | [place] [address] |              |
+--------------+-------------------+--------------------+--------------+
 POCT glucose (2019  5:21 AM)
 
+--------------------+--------------------------+---------------+-----------------+
 
| Component          | Value                    | Ref Range     | Performed At    |
+--------------------+--------------------------+---------------+-----------------+
| GLUCOSE,POC SCREEN | 142 (H)Comment: Testing  | 65 - 99 mg/dL | [facility] LAB[facility] |
|                    | performed at [facility];[address]     |               |                 |
|                    | [address];[place], [place]   |               |                 |
|                    | [id number]                    |               |                 |
+--------------------+--------------------------+---------------+-----------------+
 
 
 
+-------------------+------------------+--------------------+--------------+
| Performing        | Address          | City/State/Zipcode | Phone Number |
| Organization      |                  |                    |              |
+-------------------+------------------+--------------------+--------------+
|   [facility] LAB[facility] |   [address] | [place] [address] |              |
+-------------------+------------------+--------------------+--------------+
 Basic metabolic panel (2019  4:53 AM)
 
+----------------+--------------------------+-------------------+--------------+
| Component      | Value                    | Ref Range         | Performed At |
+----------------+--------------------------+-------------------+--------------+
| SODIUM         | 140                      | 135 - 145 mmol/L  | [facility]   |
|                |                          |                   | LABORATORY   |
+----------------+--------------------------+-------------------+--------------+
| POTASSIUM      | 4.2                      | 3.5 - 4.9 mmol/L  | [facility]   |
|                |                          |                   | LABORATORY   |
+----------------+--------------------------+-------------------+--------------+
| CHLORIDE       | 101                      | 99 - 109 mmol/L   | [facility]   |
|                |                          |                   | LABORATORY   |
+----------------+--------------------------+-------------------+--------------+
| CO2            | 28                       | 23 - 32 mmol/L    | [facility]   |
|                |                          |                   | LABORATORY   |
+----------------+--------------------------+-------------------+--------------+
| ANION GAP AGAP | 15                       | 5 - 20 mmol/L     | [facility]   |
|                |                          |                   | LABORATORY   |
+----------------+--------------------------+-------------------+--------------+
| GLUCOSE        | 157 (H)                  | 65 - 99 mg/dL     | [facility]   |
|                |                          |                   | LABORATORY   |
+----------------+--------------------------+-------------------+--------------+
| BUN            | 26 (H)                   | 8 - 25 mg/dL      | [facility]   |
|                |                          |                   | LABORATORY   |
+----------------+--------------------------+-------------------+--------------+
| CREATININE     | 6.8 (H)                  | 0.50 - 1.00 mg/dL | [facility]   |
|                |                          |                   | LABORATORY   |
+----------------+--------------------------+-------------------+--------------+
| BUN/CREAT      | 4                        |                   | [facility]   |
|                |                          |                   | LABORATORY   |
+----------------+--------------------------+-------------------+--------------+
| CALCIUM        | 9.3                      | 8.5 - 10.5 mg/dL  | [facility]   |
|                |                          |                   | LABORATORY   |
+----------------+--------------------------+-------------------+--------------+
| EGFR           | 6 (L)Comment: GFR <60:   | >60 mL/min/1.73m2 | [facility]   |
|                | CHRONIC KIDNEY DISEASE,  |                   | LABORATORY   |
|                | IF FOUND OVER A 3 MONTH  |                   |              |
|                | PERIOD.GFR <15: KIDNEY   |                   |              |
|                | FAILURE.FOR       |                   |              |
|                | [employer], MULTIPLY THE  |                   |              |
|                | CALCULATED GFR BY        |                   |              |
|                | 1.210.This eGFR is       |                   |              |
|                | calculated using the     |                   |              |
 
|                | MDRD IDMS traceable      |                   |              |
|                | equation.Testing         |                   |              |
|                | performed at [facility], [address] |                   |              |
|                |  [facility],        |                   |              |
|                | [place]    [address]   |                   |              |
+----------------+--------------------------+-------------------+--------------+
 
 
 
+----------+
| Specimen |
+----------+
| Blood    |
+----------+
 
 
 
+---------------+-------------------------+---------------------+----------------+
| Performing    | Address                 | City/State/Zipcode  | Phone Number   |
| Organization  |                         |                     |                |
+---------------+-------------------------+---------------------+----------------+
|   [facility]  |   [address]  | [place] [address] |   [phone] |
| LABORATORY    | [address]                   |                     |                |
+---------------+-------------------------+---------------------+----------------+
 CBC w/auto diff (reflex to manual) (2019  4:53 AM)
 
+-----------------+--------------------------+-------------------+--------------+
| Component       | Value                    | Ref Range         | Performed At |
+-----------------+--------------------------+-------------------+--------------+
| WBC             | 3.39 (L)                 | 3.80 - 11.00 K/uL | [facility]   |
|                 |                          |                   | LABORATORY   |
+-----------------+--------------------------+-------------------+--------------+
| RBC             | 2.78 (L)                 | 3.70 - 5.10 M/uL  | [facility]   |
|                 |                          |                   | LABORATORY   |
+-----------------+--------------------------+-------------------+--------------+
| HGB             | 9.5 (L)                  | 11.3 - 15.5 g/dL  | [facility]   |
|                 |                          |                   | LABORATORY   |
+-----------------+--------------------------+-------------------+--------------+
| HCT             | 29.2 (L)                 | 34.0 - 46.0 %     | [facility]   |
|                 |                          |                   | LABORATORY   |
+-----------------+--------------------------+-------------------+--------------+
| MCV             | 104.7 (H)                | 80.0 - 100.0 fl   | [facility]   |
|                 |                          |                   | LABORATORY   |
+-----------------+--------------------------+-------------------+--------------+
| MCH             | 34.0                     | 27.0 - 34.0 pg    | [facility]   |
|                 |                          |                   | LABORATORY   |
+-----------------+--------------------------+-------------------+--------------+
| MCHC            | 32.5                     | 32.0 - 35.5 g/dL  | [facility]   |
|                 |                          |                   | LABORATORY   |
+-----------------+--------------------------+-------------------+--------------+
| RDW SD          | 63.0 (H)                 | 37 - 53 fl        | [facility]   |
|                 |                          |                   | LABORATORY   |
+-----------------+--------------------------+-------------------+--------------+
| PLT             | 125 (L)                  | 150 - 400 K/uL    | [facility]   |
|                 |                          |                   | LABORATORY   |
+-----------------+--------------------------+-------------------+--------------+
| MPV             | 9.4                      | fl                | [facility]   |
|                 |                          |                   | LABORATORY   |
+-----------------+--------------------------+-------------------+--------------+
| DIFF TYPE       | AUTOMATED                |                   | [facility]   |
 
|                 |                          |                   | LABORATORY   |
+-----------------+--------------------------+-------------------+--------------+
| NEUTROPHILS     | 66.95                    | %                 | [facility]   |
|                 |                          |                   | LABORATORY   |
+-----------------+--------------------------+-------------------+--------------+
| LYMPHOCYTES     | 20.34                    | %                 | [facility]   |
|                 |                          |                   | LABORATORY   |
+-----------------+--------------------------+-------------------+--------------+
| MONOCYTES       | 11.96                    | %                 | [facility]   |
|                 |                          |                   | LABORATORY   |
+-----------------+--------------------------+-------------------+--------------+
| EOSINOPHILS     | 0.04                     | %                 | [facility]   |
|                 |                          |                   | LABORATORY   |
+-----------------+--------------------------+-------------------+--------------+
| BASOPHILS       | 0.71                     | %                 | [facility]   |
|                 |                          |                   | LABORATORY   |
+-----------------+--------------------------+-------------------+--------------+
| NEUTROPHILS ABS | 2.27                     | 1.90 - 7.40 K/uL  | [facility]   |
|                 |                          |                   | LABORATORY   |
+-----------------+--------------------------+-------------------+--------------+
| LYMPHOCYTES ABS | 0.69 (L)                 | 1.00 - 3.90 K/uL  | [facility]   |
|                 |                          |                   | LABORATORY   |
+-----------------+--------------------------+-------------------+--------------+
| MONOCYTES ABS   | 0.41                     | 0.00 - 0.80 K/uL  | [facility]   |
|                 |                          |                   | LABORATORY   |
+-----------------+--------------------------+-------------------+--------------+
| EOSINOPHILS ABS | 0.00                     | 0.00 - 0.50 K/uL  | [facility]   |
|                 |                          |                   | LABORATORY   |
+-----------------+--------------------------+-------------------+--------------+
| BASOPHILS ABS   | 0.02Comment: Testing     | 0.00 - 0.10 K/uL  | [facility]   |
|                 | performed at [facility], [address] |                   | LABORATORY   |
|                 |  [address],        |                   |              |
|                 | [place]  [address]     |                   |              |
+-----------------+--------------------------+-------------------+--------------+
 
 
 
+----------+
| Specimen |
+----------+
| Blood    |
+----------+
 
 
 
+---------------+-------------------------+---------------------+----------------+
| Performing    | Address                 | City/State/Zipcode  | Phone Number   |
| Organization  |                         |                     |                |
+---------------+-------------------------+---------------------+----------------+
|   [facility]  |   [address]  | [place] [address] |   [phone] |
| LABORATORY    | [address]                   |                     |                |
+---------------+-------------------------+---------------------+----------------+
 POCT glucose (2019  9:00 PM)
 
+--------------------+--------------------------+---------------+-----------------+
| Component          | Value                    | Ref Range     | Performed At    |
+--------------------+--------------------------+---------------+-----------------+
| GLUCOSE,POC SCREEN | 148 (H)Comment: Testing  | 65 - 99 mg/dL | [facility] |
|                    | performed at [facility];[address]     |               |                 |
|                    | [address];[place]   |               |                 |
 
|                    | [id number]                    |               |                 |
+--------------------+--------------------------+---------------+-----------------+
 
 
 
+-------------------+------------------+--------------------+--------------+
| Performing        | Address          | City/State/Zipcode | Phone Number |
| Organization      |                  |                    |              |
+-------------------+------------------+--------------------+--------------+
|   [facility] |   [address] | [place] [address] |              |
+-------------------+------------------+--------------------+--------------+
 IR stent femoral popliteal (2019  6:45 PM)
 
+------------------------------------------------------------------------+--------------+
| Impressions                                                            | Performed At |
+------------------------------------------------------------------------+--------------+
|      1. Aorta and iliac arteries were calcified but without            |   [facility]     |
| significant stenosis.  2. Right SFA with high-grade stenosis           | RADIOLOGY    |
| proximally with good endograft results after angioplasty and stenting. |              |
|   3. 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
| artery to right foot.  4. Right posterior tibial artery was            |              |
| chronically occluded.     Electronically signed by Kirt Mclaughlin MD on    |              |
| 2019 3:50 PM                                                      |              |
+------------------------------------------------------------------------+--------------+
 
 
 
+------------------------------------------------------------------------+--------------+
| Narrative                                                              | Performed At |
+------------------------------------------------------------------------+--------------+
|   LOWER EXTREMITY ARTERIOGRAM, UNILATERAL     PREOPERATIVE             |   [facility]     |
| DIAGNOSIS:    Critical limb ischemia of right lower extremity with     | RADIOLOGY    |
| poorly healing wound of right great toe     POSTOPERATIVE              |              |
| DIAGNOSIS:    Same     PROCEDURE:  1. Moderate conscious sedation  2.  |              |
| Ultrasound guided access of the left common femoral artery  3.         |              |
| Aortogram  4. Selective right common femoral artery catheterization    |              |
| with right leg runoff  5. Right SFA stenting using 6 x 80 mm           |              |
| self-expanding stent  6 Drug eluting balloon angioplasty of right SFA  |              |
| using 4 x 100 mm Lutonix balloon     SURGEON: Kirt Mclaughlin MD              |              |
| ASSISTANT: None     ANESTHESIA: Moderate sedation and local anesthesia |              |
|      Informed consent was obtained from the patient. Continuous        |              |
| cardiac monitoring was performed throughout the procedure.  Conscious  |              |
| sedation was provided by the nursing staff during the procedure under  |              |
| my supervision.  Sedation time: 34 minutes  Medications: 2 mg Versed   |              |
| IV, 50 mcg Fentanyl IV     ESTIMATED BLOOD LOSS: Minimal               |              |
| CONTRAST:    53 mL of Isovue 250     INDICATIONS:    See preoperative  |              |
| history and physical     FINDINGS:    Aorta and iliac arteries         |              |
| calcified but without significant stenosis. Right SFA with high-grade  |              |
| stenosis proximally. After angioplasty and stenting, good angiographic |              |
|  results. Good two-vessel runoff to right foot via right anterior      |              |
| tibial artery and peroneal artery. Right posterior tibial artery was   |              |
| chronically occluded.     DESCRIPTION OF PROCEDURE:    The patient was |              |
|  properly identified and brought to the Cath Lab. The patient was      |              |
| placed supine on the catheter table and patient was given moderate     |              |
| sedation by nursing staff under my supervision. Patient's bilateral    |              |
| groins were then prepped and draped in the usual sterile fashion.      |              |
| Local anesthetic was given to the left groin and the left common       |              |
| femoral artery was accessed under ultrasound guidance using a          |              |
| micropuncture needle. A micropuncture sheath was inserted over a wire  |              |
| and up sized to a 4 French sheath. A Omni flush catheter was then      |              |
 
| inserted into the distal aorta and aortogram was then performed with   |              |
| the findings as described above. The Omni Flush catheter was then used |              |
|  to guide a Glidewire into the right common femoral artery and then    |              |
| the catheter was then advanced over the wire. A right lower extremity  |              |
| runoff was then performed with the findings as described above.        |              |
| Next, an Amplatzer wire was then inserted over the catheter and the 4  |              |
| French sheath was removed. A 6 French destination sheath was then      |              |
| inserted over the wire with the tip of the sheath being placed into    |              |
| the right common femoral artery. A vertebral catheter was inserted     |              |
| over the wire and the wire was then removed. A Glidewire was then      |              |
| placed into the vertebral catheter and used to cross through the       |              |
| stenotic right SFA.    Next, a 260 fix core wire was then inserted     |              |
| over the catheter.    Right SFA was stented using 6 x 80 mm            |              |
| self-expanding stent. Drug eluting balloon angioplasty of the right    |              |
| SFA was then performed using 4 x 100 mm Lutonix balloon.     A final   |              |
| arteriogram was then performed through the sheath. The destination     |              |
| sheath was then removed and a Mynx closure device was then used on the |              |
|  left common femoral artery and hemostasis was ensured. The patient    |              |
| was then taken to the observation unit for further monitoring.         |              |
+------------------------------------------------------------------------+--------------+
 
 
 
+-------------------------------------------------------------------------------------------
--------------------------------------------------------------------------------------------
-----------------------------------+
| Procedure Note                                                                            
                                                                                            
                                   |
+-------------------------------------------------------------------------------------------
--------------------------------------------------------------------------------------------
-----------------------------------+
|   Denny, Rad Results In - 2019  8:40 AM PST  LOWER EXTREMITY ARTERIOGRAM,             
                                                                                            
                                   |
| UNILATERALPREOPERATIVE DIAGNOSIS:  Critical limb ischemia of right lower extremity with   
                                                                                            
                                   |
| poorly healing wound of right great toePOSTOPERATIVE DIAGNOSIS:  SamePROCEDURE:1.         
                                                                                            
                                   |
| Moderate conscious sedation2. Ultrasound guided access of the left common femoral         
                                                                                            
                                   |
| artery3. Aortogram4. Selective right common femoral artery catheterization with right     
                                                                                            
                                   |
| leg runoff5. Right SFA stenting using 6 x 80 mm self-expanding stent6 Drug eluting        
                                                                                            
                                   |
| balloon angioplasty of right SFA using 4 x 100 mm Lutonix balloonSURGEON: Kirt Mclaughlin,        
                                                                                            
                                   |
| MDASSISTANT: NoneANESTHESIA: Moderate sedation and local anesthesiaInformed consent was   
                                                                                            
                                   |
| obtained from the patient. Continuous cardiac monitoring was performed throughout the     
                                                                                            
                                   |
| procedure.Conscious sedation was provided by the nursing staff during the procedure       
 
                                                                                            
                                   |
| under my supervision.Sedation time: 34 minutesMedications: 2 mg Versed IV, 50 mcg         
                                                                                            
                                   |
| Fentanyl IVESTIMATED BLOOD LOSS: MinimalCONTRAST:  53 mL of Isovue 250INDICATIONS:  See   
                                                                                            
                                   |
| preoperative history and physicalFINDINGS:  Aorta and iliac arteries calcified but        
                                                                                            
                                   |
| without significant stenosis. Right SFA with high-grade stenosis proximally. After        
                                                                                            
                                   |
| angioplasty and stenting, good angiographic results. Good two-vessel runoff to right      
                                                                                            
                                   |
| foot via right anterior tibial artery and peroneal artery. Right posterior tibial artery  
                                                                                            
                                   |
|  was chronically occluded.DESCRIPTION OF PROCEDURE:  The patient was properly identified  
                                                                                            
                                   |
|  and brought to the Cath Lab. The patient was placed supine on the catheter table and     
                                                                                            
                                   |
| patient was given moderate sedation by nursing staff under my supervision. Patient's      
                                                                                            
                                   |
| bilateral groins were then prepped and draped in the usual sterile fashion. Local         
                                                                                            
                                   |
| anesthetic was given to the left groin and the left common femoral artery was accessed    
                                                                                            
                                   |
| under ultrasound guidance using a micropuncture needle. A micropuncture sheath was        
                                                                                            
                                   |
| inserted over a wire and up sized to a 4 French sheath. A Omni flush catheter was then    
                                                                                            
                                   |
| inserted into the distal aorta and aortogram was then performed with the findings as      
                                                                                            
                                   |
| described above. The Omni Flush catheter was then used to guide a Glidewire into the      
                                                                                            
                                   |
| right common femoral artery and then the catheter was then advanced over the wire. A      
                                                                                            
                                   |
| right lower extremity runoff was then performed with the findings as described            
                                                                                            
                                   |
| above.Next, an Amplatzer wire was then inserted over the catheter and the 4 French        
                                                                                            
                                   |
| sheath was removed. A 6 French destination sheath was then inserted over the wire with    
                                                                                            
                                   |
| the tip of the sheath being placed into the right common femoral artery. A vertebral      
 
                                                                                            
                                   |
| catheter was inserted over the wire and the wire was then removed. A Glidewire was then   
                                                                                            
                                   |
| placed into the vertebral catheter and used to cross through the stenotic right SFA.      
                                                                                            
                                   |
| Next, a 260 fix core wire was then inserted over the catheter.  Right SFA was stented     
                                                                                            
                                   |
| using 6 x 80 mm self-expanding stent. Drug eluting balloon angioplasty of the right SFA   
                                                                                            
                                   |
| was then performed using 4 x 100 mm Lutonix balloon.A final arteriogram was then          
                                                                                            
                                   |
| performed through the sheath. The destination sheath was then removed and a Mynx closure  
                                                                                            
                                   |
|  device was then used on the left common femoral artery and hemostasis was ensured. The   
                                                                                            
                                   |
| patient was then taken to the observation unit for further monitoring.IMPRESSION:1.       
                                                                                            
                                   |
| Aorta and iliac arteries were calcified but without significant stenosis.2. Right SFA     
                                                                                            
                                   |
| with high-grade stenosis proximally with good endograft results after angioplasty and     
                                                                                            
                                   |
| stenting.3. Two-vessel runoff via right anterior tibial artery and peroneal artery to     
                                                                                            
                                   |
| right foot.4. 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       
                                                                                            
                                   |
| signed by Kirt Mclaughlin MD on 2019 3:50 PM                                              
                                                                                            
                                   |
|                                                                                           
                                                                                            
                                   |
|A final arteriogram was then performed through the sheath. The destination sheath was then 
removed and a Mynx closure device was then used on the left common femoral artery and hemost
asis was ensured. The patient was  |
|then taken to the observation unit for further monitoring.                                 
                                                                                            
                                   |
|                                                                                           
                                                                                            
                                   |
|IMPRESSION:                                                                                
                                                                                            
                                  |
|                                                                                           
                                                                                            
                                   |
|1. Aorta and iliac arteries were calcified but without significant stenosis.               
 
                                                                                            
                                   |
|2. Right SFA with high-grade stenosis proximally with good endograft results after angiopla
sty and stenting.                                                                           
                                   |
|3. Two-vessel runoff via right anterior tibial artery and peroneal artery to right foot.   
                                                                                            
                                   |
|4. 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                                 
                                                                                            
                                   |
|                                                                                           
                                                                                            
                                   |
|Electronically signed by Kirt Mclaughlin MD on 2019 3:50 PM                                
                                                                                            
                                   |
+-------------------------------------------------------------------------------------------
--------------------------------------------------------------------------------------------
-----------------------------------+
 
 
 
+--------------------+------------------+--------------------+--------------+
| Performing         | Address          | City/State[facility] | Phone Number |
| Organization       |                  |                    |              |
+--------------------+------------------+--------------------+--------------+
|   [facility] |   [address] | [place] [address] |              |
+--------------------+------------------+--------------------+--------------+
 IR aortagram abdominal (2019  6:45 PM)
 
+------------------------------------------------------------------------+--------------+
| Impressions                                                            | Performed At |
+------------------------------------------------------------------------+--------------+
|      1. Aorta and iliac arteries were calcified but without            |   [facility]     |
| significant stenosis.  2. Right SFA with high-grade stenosis           | RADIOLOGY    |
| proximally with good endograft results after angioplasty and stenting. |              |
|   3. 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
| artery to right foot.  4. Right posterior tibial artery was            |              |
| chronically occluded.     Electronically signed by Kirt Mclaughlin MD on    |              |
| 2019 3:50 PM                                                      |              |
+------------------------------------------------------------------------+--------------+
 
 
 
+------------------------------------------------------------------------+--------------+
| Narrative                                                              | Performed At |
+------------------------------------------------------------------------+--------------+
|   LOWER EXTREMITY ARTERIOGRAM, UNILATERAL     PREOPERATIVE             |   [facility]     |
| DIAGNOSIS:    Critical limb ischemia of right lower extremity with     | RADIOLOGY    |
| poorly healing wound of right great toe     POSTOPERATIVE              |              |
| DIAGNOSIS:    Same     PROCEDURE:  1. Moderate conscious sedation  2.  |              |
| Ultrasound guided access of the left common femoral artery  3.         |              |
| Aortogram  4. Selective right common femoral artery catheterization    |              |
| with right leg runoff  5. Right SFA stenting using 6 x 80 mm           |              |
| self-expanding stent  6 Drug eluting balloon angioplasty of right SFA  |              |
| using 4 x 100 mm Lutonix balloon     SURGEON: Kirt Mclaughlin MD              |              |
| ASSISTANT: None     ANESTHESIA: Moderate sedation and local anesthesia |              |
|      Informed consent was obtained from the patient. Continuous        |              |
| cardiac monitoring was performed throughout the procedure.  Conscious  |              |
 
| sedation was provided by the nursing staff during the procedure under  |              |
| my supervision.  Sedation time: 34 minutes  Medications: 2 mg Versed   |              |
| IV, 50 mcg Fentanyl IV     ESTIMATED BLOOD LOSS: Minimal               |              |
| CONTRAST:    53 mL of Isovue 250     INDICATIONS:    See preoperative  |              |
| history and physical     FINDINGS:    Aorta and iliac arteries         |              |
| calcified but without significant stenosis. Right SFA with high-grade  |              |
| stenosis proximally. After angioplasty and stenting, good angiographic |              |
|  results. Good two-vessel runoff to right foot via right anterior      |              |
| tibial artery and peroneal artery. Right posterior tibial artery was   |              |
| chronically occluded.     DESCRIPTION OF PROCEDURE:    The patient was |              |
|  properly identified and brought to the Cath Lab. The patient was      |              |
| placed supine on the catheter table and patient was given moderate     |              |
| sedation by nursing staff under my supervision. Patient's bilateral    |              |
| groins were then prepped and draped in the usual sterile fashion.      |              |
| Local anesthetic was given to the left groin and the left common       |              |
| femoral artery was accessed under ultrasound guidance using a          |              |
| micropuncture needle. A micropuncture sheath was inserted over a wire  |              |
| and up sized to a 4 French sheath. A Omni flush catheter was then      |              |
| inserted into the distal aorta and aortogram was then performed with   |              |
| the findings as described above. The Omni Flush catheter was then used |              |
|  to guide a Glidewire into the right common femoral artery and then    |              |
| the catheter was then advanced over the wire. A right lower extremity  |              |
| runoff was then performed with the findings as described above.        |              |
| Next, an Amplatzer wire was then inserted over the catheter and the 4  |              |
| French sheath was removed. A 6 French destination sheath was then      |              |
| inserted over the wire with the tip of the sheath being placed into    |              |
| the right common femoral artery. A vertebral catheter was inserted     |              |
| over the wire and the wire was then removed. A Glidewire was then      |              |
| placed into the vertebral catheter and used to cross through the       |              |
| stenotic right SFA.    Next, a 260 fix core wire was then inserted     |              |
| over the catheter.    Right SFA was stented using 6 x 80 mm            |              |
| self-expanding stent. Drug eluting balloon angioplasty of the right    |              |
| SFA was then performed using 4 x 100 mm Lutonix balloon.     A final   |              |
| arteriogram was then performed through the sheath. The destination     |              |
| sheath was then removed and a Mynx closure device was then used on the |              |
|  left common femoral artery and hemostasis was ensured. The patient    |              |
| was then taken to the observation unit for further monitoring.         |              |
+------------------------------------------------------------------------+--------------+
 
 
 
+-------------------------------------------------------------------------------------------
--------------------------------------------------------------------------------------------
-----------------------------------+
| Procedure Note                                                                            
                                                                                            
                                   |
+-------------------------------------------------------------------------------------------
--------------------------------------------------------------------------------------------
-----------------------------------+
|   Denny, Rad Results In - 2019  8:40 AM PST  LOWER EXTREMITY ARTERIOGRAM,             
                                                                                            
                                   |
| UNILATERALPREOPERATIVE DIAGNOSIS:  Critical limb ischemia of right lower extremity with   
                                                                                            
                                   |
| poorly healing wound of right great toePOSTOPERATIVE DIAGNOSIS:  SamePROCEDURE:1.         
                                                                                            
                                   |
| Moderate conscious sedation2. Ultrasound guided access of the left common femoral         
 
                                                                                            
                                   |
| artery3. Aortogram4. Selective right common femoral artery catheterization with right     
                                                                                            
                                   |
| leg runoff5. Right SFA stenting using 6 x 80 mm self-expanding stent6 Drug eluting        
                                                                                            
                                   |
| balloon angioplasty of right SFA using 4 x 100 mm Lutonix balloonSURGEON: Kirt Mclauhglin,        
                                                                                            
                                   |
| MDASSISTANT: NoneANESTHESIA: Moderate sedation and local anesthesiaInformed consent was   
                                                                                            
                                   |
| obtained from the patient. Continuous cardiac monitoring was performed throughout the     
                                                                                            
                                   |
| procedure.Conscious sedation was provided by the nursing staff during the procedure       
                                                                                            
                                   |
| under my supervision.Sedation time: 34 minutesMedications: 2 mg Versed IV, 50 mcg         
                                                                                            
                                   |
| Fentanyl IVESTIMATED BLOOD LOSS: MinimalCONTRAST:  53 mL of Isovue 250INDICATIONS:  See   
                                                                                            
                                   |
| preoperative history and physicalFINDINGS:  Aorta and iliac arteries calcified but        
                                                                                            
                                   |
| without significant stenosis. Right SFA with high-grade stenosis proximally. After        
                                                                                            
                                   |
| angioplasty and stenting, good angiographic results. Good two-vessel runoff to right      
                                                                                            
                                   |
| foot via right anterior tibial artery and peroneal artery. Right posterior tibial artery  
                                                                                            
                                   |
|  was chronically occluded.DESCRIPTION OF PROCEDURE:  The patient was properly identified  
                                                                                            
                                   |
|  and brought to the Cath Lab. The patient was placed supine on the catheter table and     
                                                                                            
                                   |
| patient was given moderate sedation by nursing staff under my supervision. Patient's      
                                                                                            
                                   |
| bilateral groins were then prepped and draped in the usual sterile fashion. Local         
                                                                                            
                                   |
| anesthetic was given to the left groin and the left common femoral artery was accessed    
                                                                                            
                                   |
| under ultrasound guidance using a micropuncture needle. A micropuncture sheath was        
                                                                                            
                                   |
| inserted over a wire and up sized to a 4 French sheath. A Omni flush catheter was then    
                                                                                            
                                   |
| inserted into the distal aorta and aortogram was then performed with the findings as      
 
                                                                                            
                                   |
| described above. The Omni Flush catheter was then used to guide a Glidewire into the      
                                                                                            
                                   |
| right common femoral artery and then the catheter was then advanced over the wire. A      
                                                                                            
                                   |
| right lower extremity runoff was then performed with the findings as described            
                                                                                            
                                   |
| above.Next, an Amplatzer wire was then inserted over the catheter and the 4 French        
                                                                                            
                                   |
| sheath was removed. A 6 French destination sheath was then inserted over the wire with    
                                                                                            
                                   |
| the tip of the sheath being placed into the right common femoral artery. A vertebral      
                                                                                            
                                   |
| catheter was inserted over the wire and the wire was then removed. A Glidewire was then   
                                                                                            
                                   |
| placed into the vertebral catheter and used to cross through the stenotic right SFA.      
                                                                                            
                                   |
| Next, a 260 fix core wire was then inserted over the catheter.  Right SFA was stented     
                                                                                            
                                   |
| using 6 x 80 mm self-expanding stent. Drug eluting balloon angioplasty of the right SFA   
                                                                                            
                                   |
| was then performed using 4 x 100 mm Lutonix balloon.A final arteriogram was then          
                                                                                            
                                   |
| performed through the sheath. The destination sheath was then removed and a Mynx closure  
                                                                                            
                                   |
|  device was then used on the left common femoral artery and hemostasis was ensured. The   
                                                                                            
                                   |
| patient was then taken to the observation unit for further monitoring.IMPRESSION:1.       
                                                                                            
                                   |
| Aorta and iliac arteries were calcified but without significant stenosis.2. Right SFA     
                                                                                            
                                   |
| with high-grade stenosis proximally with good endograft results after angioplasty and     
                                                                                            
                                   |
| stenting.3. Two-vessel runoff via right anterior tibial artery and peroneal artery to     
                                                                                            
                                   |
| right foot.4. 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       
                                                                                            
                                   |
| signed by Kirt Mclaughlin MD on 2019 3:50 PM                                              
                                                                                            
                                   |
|                                                                                           
 
                                                                                            
                                   |
|A final arteriogram was then performed through the sheath. The destination sheath was then 
removed and a Mynx closure device was then used on the left common femoral artery and hemost
asis was ensured. The patient was  |
|then taken to the observation unit for further monitoring.                                 
                                                                                            
                                   |
|                                                                                           
                                                                                            
                                   |
|IMPRESSION:                                                                                
                                                                                            
                                  |
|                                                                                           
                                                                                            
                                   |
|1. Aorta and iliac arteries were calcified but without significant stenosis.               
                                                                                            
                                   |
|2. Right SFA with high-grade stenosis proximally with good endograft results after angiopla
sty and stenting.                                                                           
                                   |
|3. Two-vessel runoff via right anterior tibial artery and peroneal artery to right foot.   
                                                                                            
                                   |
|4. 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                                 
                                                                                            
                                   |
|                                                                                           
                                                                                            
                                   |
|Electronically signed by Kirt Mclaughlin MD on 2019 3:50 PM                                
                                                                                            
                                   |
+-------------------------------------------------------------------------------------------
--------------------------------------------------------------------------------------------
-----------------------------------+
 
 
 
+--------------------+------------------+--------------------+--------------+
| Performing         | Address          | City/State/Zipcode | Phone Number |
| Organization       |                  |                    |              |
+--------------------+------------------+--------------------+--------------+
|   [facility] |   [address] | [place] [address] |              |
+--------------------+------------------+--------------------+--------------+
 IR angiogram extremity right (2019  6:45 PM)
 
+------------------------------------------------------------------------+--------------+
| Impressions                                                            | Performed At |
+------------------------------------------------------------------------+--------------+
|      1. Aorta and iliac arteries were calcified but without            |   [facility]     |
| significant stenosis.  2. Right SFA with high-grade stenosis           | RADIOLOGY    |
| proximally with good endograft results after angioplasty and stenting. |              |
|   3. 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
| artery to right foot.  4. Right posterior tibial artery was            |              |
| chronically occluded.     Electronically signed by Kirt Mclaughlin MD on    |              |
| 2019 3:50 PM                                                      |              |
+------------------------------------------------------------------------+--------------+
 
 
 
 
+------------------------------------------------------------------------+--------------+
| Narrative                                                              | Performed At |
+------------------------------------------------------------------------+--------------+
|   LOWER EXTREMITY ARTERIOGRAM, UNILATERAL     PREOPERATIVE             |   [facility]     |
| DIAGNOSIS:    Critical limb ischemia of right lower extremity with     | RADIOLOGY    |
| poorly healing wound of right great toe     POSTOPERATIVE              |              |
| DIAGNOSIS:    Same     PROCEDURE:  1. Moderate conscious sedation  2.  |              |
| Ultrasound guided access of the left common femoral artery  3.         |              |
| Aortogram  4. Selective right common femoral artery catheterization    |              |
| with right leg runoff  5. Right SFA stenting using 6 x 80 mm           |              |
| self-expanding stent  6 Drug eluting balloon angioplasty of right SFA  |              |
| using 4 x 100 mm Lutonix balloon     SURGEON: Kirt Mclaughlin MD              |              |
| ASSISTANT: None     ANESTHESIA: Moderate sedation and local anesthesia |              |
|      Informed consent was obtained from the patient. Continuous        |              |
| cardiac monitoring was performed throughout the procedure.  Conscious  |              |
| sedation was provided by the nursing staff during the procedure under  |              |
| my supervision.  Sedation time: 34 minutes  Medications: 2 mg Versed   |              |
| IV, 50 mcg Fentanyl IV     ESTIMATED BLOOD LOSS: Minimal               |              |
| CONTRAST:    53 mL of Isovue 250     INDICATIONS:    See preoperative  |              |
| history and physical     FINDINGS:    Aorta and iliac arteries         |              |
| calcified but without significant stenosis. Right SFA with high-grade  |              |
| stenosis proximally. After angioplasty and stenting, good angiographic |              |
|  results. Good two-vessel runoff to right foot via right anterior      |              |
| tibial artery and peroneal artery. Right posterior tibial artery was   |              |
| chronically occluded.     DESCRIPTION OF PROCEDURE:    The patient was |              |
|  properly identified and brought to the Cath Lab. The patient was      |              |
| placed supine on the catheter table and patient was given moderate     |              |
| sedation by nursing staff under my supervision. Patient's bilateral    |              |
| groins were then prepped and draped in the usual sterile fashion.      |              |
| Local anesthetic was given to the left groin and the left common       |              |
| femoral artery was accessed under ultrasound guidance using a          |              |
| micropuncture needle. A micropuncture sheath was inserted over a wire  |              |
| and up sized to a 4 French sheath. A Omni flush catheter was then      |              |
| inserted into the distal aorta and aortogram was then performed with   |              |
| the findings as described above. The Omni Flush catheter was then used |              |
|  to guide a Glidewire into the right common femoral artery and then    |              |
| the catheter was then advanced over the wire. A right lower extremity  |              |
| runoff was then performed with the findings as described above.        |              |
| Next, an Amplatzer wire was then inserted over the catheter and the 4  |              |
| French sheath was removed. A 6 French destination sheath was then      |              |
| inserted over the wire with the tip of the sheath being placed into    |              |
| the right common femoral artery. A vertebral catheter was inserted     |              |
| over the wire and the wire was then removed. A Glidewire was then      |              |
| placed into the vertebral catheter and used to cross through the       |              |
| stenotic right SFA.    Next, a 260 fix core wire was then inserted     |              |
| over the catheter.    Right SFA was stented using 6 x 80 mm            |              |
| self-expanding stent. Drug eluting balloon angioplasty of the right    |              |
| SFA was then performed using 4 x 100 mm Lutonix balloon.     A final   |              |
| arteriogram was then performed through the sheath. The destination     |              |
| sheath was then removed and a Mynx closure device was then used on the |              |
|  left common femoral artery and hemostasis was ensured. The patient    |              |
| was then taken to the observation unit for further monitoring.         |              |
+------------------------------------------------------------------------+--------------+
 
 
 
+-------------------------------------------------------------------------------------------
 
--------------------------------------------------------------------------------------------
-----------------------------------+
| Procedure Note                                                                            
                                                                                            
                                   |
+-------------------------------------------------------------------------------------------
--------------------------------------------------------------------------------------------
-----------------------------------+
|   Lauro Baldwin Results In - 2019  8:40 AM PST  LOWER EXTREMITY ARTERIOGRAM,             
                                                                                            
                                   |
| UNILATERALPREOPERATIVE DIAGNOSIS:  Critical limb ischemia of right lower extremity with   
                                                                                            
                                   |
| poorly healing wound of right great toePOSTOPERATIVE DIAGNOSIS:  SamePROCEDURE:1.         
                                                                                            
                                   |
| Moderate conscious sedation2. Ultrasound guided access of the left common femoral         
                                                                                            
                                   |
| artery3. Aortogram4. Selective right common femoral artery catheterization with right     
                                                                                            
                                   |
| leg runoff5. Right SFA stenting using 6 x 80 mm self-expanding stent6 Drug eluting        
                                                                                            
                                   |
| balloon angioplasty of right SFA using 4 x 100 mm Lutonix balloonSURGEON: Kirt Mclaughlin,        
                                                                                            
                                   |
| MDASSISTANT: NoneANESTHESIA: Moderate sedation and local anesthesiaInformed consent was   
                                                                                            
                                   |
| obtained from the patient. Continuous cardiac monitoring was performed throughout the     
                                                                                            
                                   |
| procedure.Conscious sedation was provided by the nursing staff during the procedure       
                                                                                            
                                   |
| under my supervision.Sedation time: 34 minutesMedications: 2 mg Versed IV, 50 mcg         
                                                                                            
                                   |
| Fentanyl IVESTIMATED BLOOD LOSS: MinimalCONTRAST:  53 mL of Isovue 250INDICATIONS:  See   
                                                                                            
                                   |
| preoperative history and physicalFINDINGS:  Aorta and iliac arteries calcified but        
                                                                                            
                                   |
| without significant stenosis. Right SFA with high-grade stenosis proximally. After        
                                                                                            
                                   |
| angioplasty and stenting, good angiographic results. Good two-vessel runoff to right      
                                                                                            
                                   |
| foot via right anterior tibial artery and peroneal artery. Right posterior tibial artery  
                                                                                            
                                   |
|  was chronically occluded.DESCRIPTION OF PROCEDURE:  The patient was properly identified  
                                                                                            
                                   |
|  and brought to the Cath Lab. The patient was placed supine on the catheter table and     
 
                                                                                            
                                   |
| patient was given moderate sedation by nursing staff under my supervision. Patient's      
                                                                                            
                                   |
| bilateral groins were then prepped and draped in the usual sterile fashion. Local         
                                                                                            
                                   |
| anesthetic was given to the left groin and the left common femoral artery was accessed    
                                                                                            
                                   |
| under ultrasound guidance using a micropuncture needle. A micropuncture sheath was        
                                                                                            
                                   |
| inserted over a wire and up sized to a 4 French sheath. A Omni flush catheter was then    
                                                                                            
                                   |
| inserted into the distal aorta and aortogram was then performed with the findings as      
                                                                                            
                                   |
| described above. The Omni Flush catheter was then used to guide a Glidewire into the      
                                                                                            
                                   |
| right common femoral artery and then the catheter was then advanced over the wire. A      
                                                                                            
                                   |
| right lower extremity runoff was then performed with the findings as described            
                                                                                            
                                   |
| above.Next, an Amplatzer wire was then inserted over the catheter and the 4 French        
                                                                                            
                                   |
| sheath was removed. A 6 French destination sheath was then inserted over the wire with    
                                                                                            
                                   |
| the tip of the sheath being placed into the right common femoral artery. A vertebral      
                                                                                            
                                   |
| catheter was inserted over the wire and the wire was then removed. A Glidewire was then   
                                                                                            
                                   |
| placed into the vertebral catheter and used to cross through the stenotic right SFA.      
                                                                                            
                                   |
| Next, a 260 fix core wire was then inserted over the catheter.  Right SFA was stented     
                                                                                            
                                   |
| using 6 x 80 mm self-expanding stent. Drug eluting balloon angioplasty of the right SFA   
                                                                                            
                                   |
| was then performed using 4 x 100 mm Lutonix balloon.A final arteriogram was then          
                                                                                            
                                   |
| performed through the sheath. The destination sheath was then removed and a Mynx closure  
                                                                                            
                                   |
|  device was then used on the left common femoral artery and hemostasis was ensured. The   
                                                                                            
                                   |
| patient was then taken to the observation unit for further monitoring.IMPRESSION:1.       
 
                                                                                            
                                   |
| Aorta and iliac arteries were calcified but without significant stenosis.2. Right SFA     
                                                                                            
                                   |
| with high-grade stenosis proximally with good endograft results after angioplasty and     
                                                                                            
                                   |
| stenting.3. Two-vessel runoff via right anterior tibial artery and peroneal artery to     
                                                                                            
                                   |
| right foot.4. 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       
                                                                                            
                                   |
| signed by Kirt Mclaughlin MD on 2019 3:50 PM                                              
                                                                                            
                                   |
|                                                                                           
                                                                                            
                                   |
|A final arteriogram was then performed through the sheath. The destination sheath was then 
removed and a Mynx closure device was then used on the left common femoral artery and hemost
asis was ensured. The patient was  |
|then taken to the observation unit for further monitoring.                                 
                                                                                            
                                   |
|                                                                                           
                                                                                            
                                   |
|IMPRESSION:                                                                                
                                                                                            
                                  |
|                                                                                           
                                                                                            
                                   |
|1. Aorta and iliac arteries were calcified but without significant stenosis.               
                                                                                            
                                   |
|2. Right SFA with high-grade stenosis proximally with good endograft results after angiopla
sty and stenting.                                                                           
                                   |
|3. Two-vessel runoff via right anterior tibial artery and peroneal artery to right foot.   
                                                                                            
                                   |
|4. 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                                 
                                                                                            
                                   |
|                                                                                           
                                                                                            
                                   |
|Electronically signed by Kirt Mclaughlin MD on 2019 3:50 PM                                
                                                                                            
                                   |
+-------------------------------------------------------------------------------------------
--------------------------------------------------------------------------------------------
-----------------------------------+
 
 
 
+--------------------+------------------+--------------------+--------------+
 
| Performing         | Address          | City/State/Zipcode | Phone Number |
| Organization       |                  |                    |              |
+--------------------+------------------+--------------------+--------------+
|   [facility] |   [address] | [place] [address] |              |
+--------------------+------------------+--------------------+--------------+
 Blood Culture Set 2 (2019  6:05 PM)
 
+----------------------+------------------------+-----------+--------------+
| Component            | Value                  | Ref Range | Performed At |
+----------------------+------------------------+-----------+--------------+
| Specimen Description | BLOOD, PERIPHERAL DRAW |           | [facility]   |
|                      |                        |           | LABORATORY   |
+----------------------+------------------------+-----------+--------------+
| CULTURE              | NO GROWTH 6 DAYS       |           | [facility]   |
|                      |                        |           | LABORATORY   |
+----------------------+------------------------+-----------+--------------+
 
 
 
+-----------------+
| Specimen        |
+-----------------+
| Blood - Blood,  |
| peripheral draw |
+-----------------+
 
 
 
+---------------+-------------------------+---------------------+----------------+
| Performing    | Address                 | City/State/Zipcode  | Phone Number   |
| Organization  |                         |                     |                |
+---------------+-------------------------+---------------------+----------------+
|   [facility]  |   [address]  | [place] [address] |   [phone] |
| LABORATORY    | [address]                   |                     |                |
+---------------+-------------------------+---------------------+----------------+
 IR guidance vascular access US (2019  5:40 PM)
 
+------------------------------------------------------------------------+--------------+
| Narrative                                                              | Performed At |
+------------------------------------------------------------------------+--------------+
|   This Point of Care (POC) ultrasound image has been reviewed and      |   [facility]     |
| interpreted by the physician identified as the performing physician in | RADIOLOGY    |
|  the   associated interpretation and report.                           |              |
+------------------------------------------------------------------------+--------------+
 
 
 
+--------------------+------------------+--------------------+--------------+
| Performing         | Address          | City/State/Zipcode | Phone Number |
| Organization       |                  |                    |              |
+--------------------+------------------+--------------------+--------------+
|   [facility] |   [address] | [place] [address] |              |
+--------------------+------------------+--------------------+--------------+
 C-reactive protein (2019  3:43 PM)
 
+-----------+--------------------------+------------+-----------------+
| Component | Value                    | Ref Range  | Performed At    |
+-----------+--------------------------+------------+-----------------+
| CRP       | 0.6 (H)Comment: Testing  | <0.5 mg/dL | [facility] |
|           | performed at [facility] [place] [facility];[address]     |            |                 |
 
|           | [address];[place]   |            |                 |
|           | [id number]                    |            |                 |
+-----------+--------------------------+------------+-----------------+
 
 
 
+-------------------+
| Specimen          |
+-------------------+
| Blood - Arm, Left |
+-------------------+
 
 
 
+-------------------+------------------+--------------------+--------------+
| Performing        | Address          | City/State/Zipcode | Phone Number |
| Organization      |                  |                    |              |
+-------------------+------------------+--------------------+--------------+
|   [facility] |   [address] | [place] [address] |              |
+-------------------+------------------+--------------------+--------------+
 Sedimentation rate, automated (2019  3:43 PM)
 
+-----------+-------------------------+--------------+-----------------+
| Component | Value                   | Ref Range    | Performed At    |
+-----------+-------------------------+--------------+-----------------+
| ESR       | 13Comment: Testing      | 0 - 30 mm/Hr | [facility] |
|           | performed at [facility] [place];[address]    |              |                 |
|           | [address];[place]  |              |                 |
|           | [id number]                   |              |                 |
+-----------+-------------------------+--------------+-----------------+
 
 
 
+-------------------+
| Specimen          |
+-------------------+
| Blood - Arm, Left |
+-------------------+
 
 
 
+-------------------+------------------+--------------------+--------------+
| Performing        | Address          | City/State/Zipcode | Phone Number |
| Organization      |                  |                    |              |
+-------------------+------------------+--------------------+--------------+
|   [facility] |   [address] | [place] [address] |              |
+-------------------+------------------+--------------------+--------------+
 CPK (2019  3:43 PM)
 
+-----------+-------------------------+--------------+-----------------+
| Component | Value                   | Ref Range    | Performed At    |
+-----------+-------------------------+--------------+-----------------+
| CPK       | 28 (L)Comment: Testing  | 30 - 240 U/L | [facility] |
|           | performed at [facility] [place] [facility];[address]    |              |                 |
|           | [address];[place]  |              |                 |
|           | [id number]                   |              |                 |
+-----------+-------------------------+--------------+-----------------+
 
 
 
 
+-------------------+
| Specimen          |
+-------------------+
| Blood - Arm, Left |
+-------------------+
 
 
 
+-------------------+------------------+--------------------+--------------+
| Performing        | Address          | City/State/Zipcode | Phone Number |
| Organization      |                  |                    |              |
+-------------------+------------------+--------------------+--------------+
|   [facility] |   [address] | [place] [address] |              |
+-------------------+------------------+--------------------+--------------+
 Comprehensive metabolic panel (2019  3:43 PM)
 
+----------------+--------------------------+-------------------+-----------------+
| Component      | Value                    | Ref Range         | Performed At    |
+----------------+--------------------------+-------------------+-----------------+
| SODIUM         | 139                      | 135 - 145 mmol/L  | [facility] |
+----------------+--------------------------+-------------------+-----------------+
| POTASSIUM      | 4.0                      | 3.5 - 4.9 mmol/L  | [facility] |
+----------------+--------------------------+-------------------+-----------------+
| CHLORIDE       | 100                      | 99 - 109 mmol/L   | [facility] |
+----------------+--------------------------+-------------------+-----------------+
| CO2            | 29                       | 23 - 32 mmol/L    | [facility] |
+----------------+--------------------------+-------------------+-----------------+
| ANION GAP AGAP | 14                       | 5 - 20 mmol/L     | [facility] |
+----------------+--------------------------+-------------------+-----------------+
| GLUCOSE        | 202 (H)                  | 65 - 99 mg/dL     | [facility] |
+----------------+--------------------------+-------------------+-----------------+
| BUN            | 23                       | 8 - 25 mg/dL      | [facility] |
+----------------+--------------------------+-------------------+-----------------+
| CREATININE     | 6.1 (H)                  | 0.50 - 1.00 mg/dL | [facility] |
+----------------+--------------------------+-------------------+-----------------+
| BUN/CREAT      | 4                        |                   | [facility] |
+----------------+--------------------------+-------------------+-----------------+
| CALCIUM        | 9.0                      | 8.5 - 10.5 mg/dL  | [facility] |
+----------------+--------------------------+-------------------+-----------------+
| TOTAL PROTEIN  | 6.2 (L)                  | 6.3 - 8.2 g/dL    | [facility] |
+----------------+--------------------------+-------------------+-----------------+
| Albumin        | 2.7 (L)                  | 3.3 - 4.8 g/dL    | [facility] |
+----------------+--------------------------+-------------------+-----------------+
| GLOBULIN       | 3.5                      | 1.3 - 4.9 g/dL    | [facility] |
+----------------+--------------------------+-------------------+-----------------+
| A/G            | 0.8 (L)                  | 1.0 - 2.4         | [facility] |
+----------------+--------------------------+-------------------+-----------------+
| TBIL           | 0.4                      | 0.1 - 1.5 mg/dL   | [facility] |
+----------------+--------------------------+-------------------+-----------------+
| ALK PHOS       | 110                      | 35 - 115 U/L      | [facility] |
+----------------+--------------------------+-------------------+-----------------+
| AST            | 24                       | 10 - 45 U/L       | [facility] |
+----------------+--------------------------+-------------------+-----------------+
| ALT            | 21                       | 10 - 65 U/L       | [facility] |
+----------------+--------------------------+-------------------+-----------------+
| EGFR           | 7 (L)Comment: GFR <60:   | >60 mL/min/1.73m2 | [facility] |
|                | CHRONIC KIDNEY DISEASE,  |                   |                 |
|                | IF FOUND OVER A 3 MONTH  |                   |                 |
|                | PERIOD.GFR <15: KIDNEY   |                   |                 |
|                | FAILURE.FOR       |                   |                 |
 
|                | AMERICANS, MULTIPLY THE  |                   |                 |
|                | CALCULATED GFR BY        |                   |                 |
|                | 1.210.This eGFR is       |                   |                 |
|                | calculated using the     |                   |                 |
|                | MDRD IDMS traceable      |                   |                 |
|                | equation.Testing         |                   |                 |
|                | performed at [facility];[address]     |                   |                 |
|                | [facility];[place], [place]   |                   |                 |
|                | [id number]                    |                   |                 |
+----------------+--------------------------+-------------------+-----------------+
 
 
 
+-------------------+
| Specimen          |
+-------------------+
| Blood - Arm, Left |
+-------------------+
 
 
 
+-------------------+------------------+--------------------+--------------+
| Performing        | Address          | City/State/Zipcode | Phone Number |
| Organization      |                  |                    |              |
+-------------------+------------------+--------------------+--------------+
|   [facility] |   [address] | [place] [address] |              |
+-------------------+------------------+--------------------+--------------+
 Blood Culture Set 1 (2019  3:43 PM)
 
+----------------------+------------------+-----------+-----------------+
| Component            | Value            | Ref Range | Performed At    |
+----------------------+------------------+-----------+-----------------+
| Specimen Description | BLOOD            |           | [facility]      |
|                      |                  |           | LABORATORY      |
+----------------------+------------------+-----------+-----------------+
| SPECIAL REQUESTS     | RAC              |           | [facility] |
+----------------------+------------------+-----------+-----------------+
| CULTURE              | NO GROWTH 6 DAYS |           | [facility]      |
|                      |                  |           | LABORATORY      |
+----------------------+------------------+-----------+-----------------+
 
 
 
+----------------+
| Specimen       |
+----------------+
| Blood - Blood  |
+----------------+
 
 
 
+-------------------+-------------------------+---------------------+----------------+
| Performing        | Address                 | City/State/Zipcode  | Phone Number   |
| Organization      |                         |                     |                |
+-------------------+-------------------------+---------------------+----------------+
|   [facility]      |   [address]  | [place] [address] |   [phone] |
| LABORATORY        | [address]vd[address]                   |                     |                |
+-------------------+-------------------------+---------------------+----------------+
|   [facility] |   [address]        | [place] [address]  |                |
+-------------------+-------------------------+---------------------+----------------+
 
 CBC w/manual diff (2019  3:43 PM)
 
+----------------------+-------------------------+-------------------+-----------------+
| Component            | Value                   | Ref Range         | Performed At    |
+----------------------+-------------------------+-------------------+-----------------+
| WBC                  | 5.53Comment:            | 3.80 - 11.00 K/uL | [facility] |
+----------------------+-------------------------+-------------------+-----------------+
| RBC                  | 2.76 (L)                | 3.70 - 5.10 M/uL  | [facility] |
+----------------------+-------------------------+-------------------+-----------------+
| HGB                  | 9.4 (L)                 | 11.3 - 15.5 g/dL  | [facility] |
+----------------------+-------------------------+-------------------+-----------------+
| HCT                  | 28.4 (L)                | 34.0 - 46.0 %     | [facility] |
+----------------------+-------------------------+-------------------+-----------------+
| MCV                  | 102.9 (H)               | 80.0 - 100.0 fl   | [facility] |
+----------------------+-------------------------+-------------------+-----------------+
| MCH                  | 34.1 (H)                | 27.0 - 34.0 pg    | [facility] |
+----------------------+-------------------------+-------------------+-----------------+
| MCHC                 | 33.2                    | 32.0 - 35.5 g/dL  | [facility] |
+----------------------+-------------------------+-------------------+-----------------+
| RDW SD               | 61.3 (H)                | 37 - 53 fl        | [facility] |
+----------------------+-------------------------+-------------------+-----------------+
| PLT                  | 147 (L)Comment:         | 150 - 400 K/uL    | [facility] |
+----------------------+-------------------------+-------------------+-----------------+
| MPV                  | 9.3Comment:             | fl                | [facility] |
+----------------------+-------------------------+-------------------+-----------------+
| DIFF TYPE            | MANUAL                  |                   | [facility] |
+----------------------+-------------------------+-------------------+-----------------+
| Neutrophils Manual   | 64                      | %                 | [facility] |
+----------------------+-------------------------+-------------------+-----------------+
| Lymphocytes Manual   | 28                      | %                 | [facility] |
+----------------------+-------------------------+-------------------+-----------------+
| Monocytes Manual     | 8                       | %                 | [facility] |
+----------------------+-------------------------+-------------------+-----------------+
| Neutrophils Absolute | 3.54                    | 1.90 - 7.40 K/uL  | [facility] |
+----------------------+-------------------------+-------------------+-----------------+
| Lymphocytes Absolute | 1.55                    | 1.00 - 3.90 K/uL  | [facility] |
+----------------------+-------------------------+-------------------+-----------------+
| Monocytes Absolute   | 0.44                    | 0.00 - 0.80 K/uL  | [facility] |
+----------------------+-------------------------+-------------------+-----------------+
| MORPHOLOGY           | 1+Comment: ANISONORMAL  |                   | [facility] |
|                      | PLT MORPHTesting        |                   |                 |
|                      | performed at [facility]    |                   |                 |
|                      | [address];[place], [place]  |                   |                 |
|                      | [id number]                   |                   |                 |
|                      |                         |                   |                 |
+----------------------+-------------------------+-------------------+-----------------+
 
 
 
+-------------------+
| Specimen          |
+-------------------+
| Blood - Arm, Left |
+-------------------+
 
 
 
+-------------------+------------------+--------------------+--------------+
| Performing        | Address          | City/State/Zipcode | Phone Number |
| Organization      |                  |                    |              |
 
+-------------------+------------------+--------------------+--------------+
|   [facility] |   [address] | [place] [address] |              |
+-------------------+------------------+--------------------+--------------+
 US lower extremty  arterial right (2019  2:23 PM)
 
+------------------------------------------------------------------------+--------------+
| Impressions                                                            | Performed At |
+------------------------------------------------------------------------+--------------+
|   1. Right leg shows biphasic inflow with a greater than 50% stenosis  |   [facility]     |
| at  the origin of the superficial femoral artery                       | RADIOLOGY    |
| (137-309).    Biphasic  outflow.  2. Two vessel runoff to the right    |              |
| foot.  Signed by: Eugenio Hoffman  Sign Date/Time: 2019 3:11 PM  |              |
+------------------------------------------------------------------------+--------------+
 
 
 
+------------------------------------------------------------------------+--------------+
| Narrative                                                              | Performed At |
+------------------------------------------------------------------------+--------------+
|   LOWER EXTREMITY ARTERIAL EXAM WITH IMAGING  CLINICAL INFORMATION:    |   [facility]     |
| The patient is a 68-year-old female presenting to the vascular lab     | RADIOLOGY    |
| with  complaints of right foot nonhealing wound.    We have been asked |              |
|  to  evaluate for peripheral arterial disease.  COMPARISON:  None      |              |
| PROCEDURE:  Imaging of the right lower extremity arteries was          |              |
| performed using both  color Doppler and spectral Doppler techniques    |              |
| with a 9 megahertz linear  array transducer.  FINDINGS:  RIGHT  CFA    |              |
| prox 137 biphasic  DFA prox 71 biphasic  SFA prox 309 biphasic  SFA    |              |
| mid 91 biphasic  SFA distal 127 biphasic  POP mid 96 biphasic  GIL     |              |
| prox 69 biphasic  GIL distal 145 biphasic  PTA prox 50 biphasic  PTA   |              |
| distal 58 biphasic  WINIFRED prox 74 biphasic  WINIFRED distal 0    absent     |              |
+------------------------------------------------------------------------+--------------+
 
 
 
+------------------------------------------------------------------------------------------+
| Procedure Note                                                                           |
+------------------------------------------------------------------------------------------+
|   Lauro Baldwin Results In - 2019  3:14 PM PST  LOWER EXTREMITY ARTERIAL EXAM WITH      |
| IMAGINGCLINICAL INFORMATION:The patient is a 68-year-old female presenting to the        |
| vascular lab withcomplaints of right foot nonhealing wound.  We have been asked          |
| toevaluate for peripheral arterial disease.COMPARISON:NonePROCEDURE:Imaging of the right |
|  lower extremity arteries was performed using bothcolor Doppler and spectral Doppler     |
| techniques with a 9 megahertz lineararray transducer.FINDINGS:RIGHTCFA prox 137          |
| biphasicDFA prox 71 biphasicSFA prox 309 biphasicSFA mid 91 biphasicSFA distal 127       |
| biphasicPOP mid 96 biphasicATA prox 69 biphasicATA distal 145 biphasicPTA prox 50        |
| biphasicPTA distal 58 biphasicPERA prox 74 biphasicPERA distal 0  absentIMPRESSION:1.    |
| Right leg shows biphasic inflow with a greater than 50% stenosis atthe origin of the     |
| superficial femoral artery (137-309).  Biphasicoutflow.2. Two vessel runoff to the right |
|  foot.Signed by: Jamilah Hoffman Date/Time: 2019 3:11 PM                       |
|RIGHT                                                                                    |
|CFA prox 137 biphasic                                                                     |
|DFA prox 71 biphasic                                                                      |
|SFA prox 309 biphasic                                                                     |
|SFA mid 91 biphasic                                                                       |
|SFA distal 127 biphasic                                                                   |
|POP mid 96 biphasic                                                                       |
|GIL prox 69 biphasic                                                                      |
|GIL distal 145 biphasic                                                                   |
|PTA prox 50 biphasic                                                                      |
|PTA distal 58 biphasic                                                                    |
 
|WINIFRED prox 74 biphasic                                                                     |
|WINIFRED distal 0  absent                                                                     |
|IMPRESSION:                                                                              |
|1. Right leg shows biphasic inflow with a greater than 50% stenosis at                    |
|the origin of the superficial femoral artery (137-309).  Biphasic                         |
|outflow.                                                                                 |
|2. Two vessel runoff to the right foot.                                                   |
|Signed by: Eugenio Hoffman                                                                |
|Sign Date/Time: 2019 3:11 PM                                                        |
+------------------------------------------------------------------------------------------+
 
 
 
+--------------------+------------------+--------------------+--------------+
| Performing         | Address          | City/State/Zipcode | Phone Number |
| Organization       |                  |                    |              |
+--------------------+------------------+--------------------+--------------+
|   [facility] |   [address] | [place] [address] |              |
+--------------------+------------------+--------------------+--------------+
 ED INFORMATION EXCHANGE (2019  1:03 PM)
 
+------------------------------------------------------------------------+--------------+
| Narrative                                                              | Performed At |
+------------------------------------------------------------------------+--------------+
|   [id number] [id number]     Upcoming Changes to the [facility]     |   ED         |
| Notification  On 2019 the layout of this notification will | INFORMATION  |
|  be updated. For an overview of upcoming changes, please log into      | EXCHANGE     |
| [email or web address]. For questions,       |              |
| please email [email or web address] or call [phone].     |              |
|  This patient has registered at the [facility]     |              |
| Emergency Department   For more information visit:                     |              |
| [email or web address] |              |
| [id number]   Security Events  No recent Security Events currently on file  |              |
|     ED Care Guidelines from [employer] [facility]  Last Updated: 18 |              |
|  10:16 AM         Other Information:  Currently being seen by Myranda |              |
|  in [place] for mental health concerns.[phone]  These are       |              |
| guidelines and the provider should exercise clinical judgment when     |              |
| providing care.  Additional guidelines are also available online from  |              |
| the following facilities: [facility]   Recent Emergency      |              |
| Department Visit Summary  Date Facility City State Type Major Type     |              |
| Diagnoses or Chief Complaint   2019 [facility]       |              |
| [place] Emergency    Emergency       2019 [facility]    |              |
| [facility] [place] Emergency    Emergency          Chest Pain              |              |
| Nausea        Shortness of Breath        Chest pain, unspecified       |              |
|      Elevated blood-pressure reading, without diagnosis of             |              |
| hypertension        Presence of aortocoronary bypass graft             |              |
| Other specified postprocedural states      2019 [facility]  |              |
| [facility] [place] Emergency    Emergency          CHEST PAIN            |              |
| Abnormal levels of other serum enzymes        Personal history of      |              |
| other diseases of the circulatory system        Chest pain,            |              |
| unspecified      2019 [facility] [place]            |              |
| Emergency    Emergency          FISTULA BLEEDING        Hemorrhage due |              |
|  to vascular prosthetic devices, implants and grafts, initial          |              |
| encounter      Dec 22, 2018 [facility] [place]             |              |
| Emergency    Emergency          CHEST PAIN        Type 2 diabetes      |              |
| mellitus with hyperglycemia        Chest pain, unspecified      Nov    |              |
| 2018 [place] Emergency    Emergency    Chief |              |
|  Complaint: SOB      2018 [facility] [place]       |              |
| Emergency    Emergency          FALL        Unspecified fall, initial  |              |
| encounter        Dependence on renal dialysis        End stage renal   |              |
 
| disease      2018 [facility] [place]               |              |
| Emergency    Emergency          FALL        Essential (primary)        |              |
| hypertension        Type 2 diabetes mellitus with hyperglycemia        |              |
|  Type 2 diabetes mellitus with unspecified complications               |              |
| Unspecified fall, initial encounter        Headache      2018  |              |
| [place] Emergency    Emergency          -1.      |              |
| Dorsalgia, unspecified        -1. Painful micturition, unspecified     |              |
|      0. Frequency of micturition        1. Urinary tract infection,    |              |
| site not specified        6. Type 2 diabetes mellitus with             |              |
| hyperglycemia        7. Type 2 diabetes mellitus with diabetic chronic |              |
|  kidney disease        8. End stage renal disease        9. Dependence |              |
|  on renal dialysis        10. Shortness of breath        11. Long term |              |
|  (current) use of anticoagulants            E.D. Visit Count (12 mo.)  |              |
|  Facility Visits Low Acuity   [facility] 17 0   [facility]        |              |
| [facility] 2 0   [facility] 3 0   Total   |              |
| 22 0   Note: Visits indicate total known visits. Medicaid Low Acuity   |              |
| Dx are the number of primary diagnoses on the Medicaid's Low Acuity dx |              |
|  list.         Recent Inpatient Visit Summary  Date Facility City      |              |
| State Type Major Type Diagnoses or Chief Complaint   Dec 27, 2018      |              |
| [facility]. [place] Recovery    Inpatient                   |              |
|     Peripheral arterial disease, osteomyelitis left foot        Other  |              |
| acute osteomyelitis, left ankle and foot        Long term (current)    |              |
| use of antibiotics        End stage renal disease        Anemia in     |              |
| chronic kidney disease      Dec 5, 2018 [facility] |              |
|  General Medicine    Inpatient          Peripheral vascular disease,   |              |
| unspecified        End stage renal disease        Dependence on renal  |              |
| dialysis        Long term (current) use of insulin        Other        |              |
| chronic osteomyelitis, left ankle and foot        Type 2 diabetes      |              |
| mellitus with diabetic chronic kidney disease        Essential         |              |
| (primary) hypertension      2018 [facility].   |              |
| [place] Inpatient    Inpatient          Upper GIB        Other chronic      |              |
| osteomyelitis, unspecified ankle and foot        End stage renal       |              |
| disease        Other specified personal risk factors, not elsewhere    |              |
| classified        Chronic systolic (congestive) heart failure          |              |
| Anemia in chronic kidney disease        Other disorders of             |              |
| plasma-protein metabolism, not elsewhere classified        Moderate    |              |
| protein-calorie malnutrition        Other disorders of phosphorus      |              |
| metabolism      [facility] [place] [facility]     |              |
| [facility]    Inpatient          1. Type 2 diabetes mellitus with other  |              |
| specified complication        2. Urinary tract infection, site not     |              |
| specified        3. Unspecified protein-calorie malnutrition        4. |              |
|  Other chronic osteomyelitis, unspecified site        5. Hypertensive  |              |
| heart and chronic kidney disease with heart failure and with stage 5   |              |
| chronic kidney disease, or end stage renal disease        6. Chronic   |              |
| diastolic (congestive) heart failure        7. Other osteomyelitis,    |              |
| ankle and foot        8. Type 2 diabetes mellitus with foot ulcer      |              |
|      9. Atherosclerotic heart disease of native coronary artery        |              |
| without angina pectoris        10. Chronic obstructive pulmonary       |              |
| disease, unspecified      2018 [facility] [place]    |              |
| Medical Surgical    Inpatient          1. Benign paroxysmal vertigo,   |              |
| unspecified ear        2. End stage renal disease        3.            |              |
| Hypertensive chronic kidney disease with stage 5 chronic kidney        |              |
| disease or end stage renal disease        4. Urinary tract infection,  |              |
| site not specified        5. Hypertensive heart and chronic kidney     |              |
| disease with heart failure and with stage 5 chronic kidney disease, or |              |
|  end stage renal disease        6. Kidney transplant status        7.  |              |
| Unspecified systolic (congestive) heart failure        8. Syncope and  |              |
| collapse        9. Type 2 diabetes mellitus with diabetic chronic      |              |
| kidney disease        10. Atherosclerotic heart disease of native      |              |
| coronary artery without angina pectoris            Prescription Drug   |              |
 
| Report (12 Mo.)  PDMP query found no report.     Care Providers        |              |
| Provider PRC Type Phone Fax Service Dates   HEADINGS, SANTIAGO, F.N.P.     |              |
| Nurse Practitioner: Family     Current      [facility] Case        |              |
| Manager/Care Coordinator [phone]    2019 - Current       |              |
| [facility] Primary Care [phone]    2019 -            |              |
| Current      [facility]    ([phone])       |              |
| [phone] Current      [facility] Primary     |              |
| Care     Current      MANOLO GONZALEZ Primary Care     Current            |              |
| [facility] Provider [phone]     |              |
| Current      [facility] Case or Care Manager     Current      [facility]   |              |
| MIRTA Sr Case or Care Manager [phone]     |              |
| [phone] Current      Jairo Gutierrez MD Other     Current           |              |
| Known Aliases  No known aliases.   Criteria Met         Care           |              |
| Guidelines      10 in 12      3 in 60     The above information is     |              |
| provided for the sole purpose of patient treatment. Use of this        |              |
| information beyond the terms of Data Sharing Memorandum of             |              |
| Understanding and License Agreement is prohibited. In certain cases    |              |
| not all visits may be represented.   Consult the aforementioned        |              |
| facilities for additional information.   2019 [employer]       |              |
| [employer] - [place], [place] -                              |              |
| [email or web address]                                         |              |
+------------------------------------------------------------------------+--------------+
 
 
 
+-------------------------------------------------------------------------------------------
--------------------------------------------------------------------------------------------
--------------------+
| Procedure Note                                                                            
                                                                                            
                    |
+-------------------------------------------------------------------------------------------
--------------------------------------------------------------------------------------------
--------------------+
|   Interface, Lab - 2019  1:04 PM PST  Formatting of this note may be different      
                                                                                            
                    |
| from the original.[id number] [id number] Changes to the Arnie             
                                                                                            
                    |
| NotificationOn 2019 the layout of this notification will be updated. For an   
                                                                                            
                    |
| overview of upcoming changes, please log into                                             
                                                                                            
                    |
| [email or web address]. For questions, please email             
                                                                                            
                    |
| [email or web address] or call [phone].This patient has registered at the   
                                                                                            
                    |
| [facility] Emergency Department For more information visit:           
                                                                                            
                    |
| [email or web address] Security     
                                                                                            
                    |
| EventsNo recent Security Events currently on fileED Care Guidelines from [employer] -       
                                                                                            
 
                    |
| [place]illaLast Updated: 18 10:16 AM  Other Information:Currently being seen by         
                                                                                            
                    |
| [facility] in [place] for mental health concerns.[phone] are guidelines and     
                                                                                            
                    |
| the provider should exercise clinical judgment when providing care.Additional guidelines  
                                                                                            
                    |
|  are also available online from the following facilities: [employer] Recent     
                                                                                            
                    |
| Emergency Department Visit SummaryDate Facility [facility] State Type Major Type Diagnoses or   
                                                                                            
                    |
| Chief Complaint 2019 [facility] [place]. [place] Emergency  Emergency   [place]    
                                                                                            
                    |
[facility] [place] [place] [facility]    Chest Pain    Nausea      
                                                                                            
                    |
| Shortness of Breath    Chest pain, unspecified    Elevated blood-pressure reading,        
                                                                                            
                    |
| without diagnosis of hypertension    Presence of aortocoronary bypass graft    Other      
                                                                                            
                    |
| specified postprocedural states  2019 [facility] [employer] [facility] [place] Emergency    
                                                                                            
                    |
| Emergency    CHEST PAIN    Abnormal levels of other serum enzymes    Personal history of  
                                                                                            
                    |
|  other diseases of the circulatory system    Chest pain, unspecified  2019 [facility]    
                                                                                            
                    |
| [facility] [place]. [place] Emergency  Emergency    FISTULA BLEEDING    Hemorrhage due to   
                                                                                            
                    |
| vascular prosthetic devices, implants and grafts, initial encounter  Dec 22, 2018 [facility]    
                                                                                            
                    |
| [facility] [place] Emergency  Emergency    CHEST PAIN    Type 2 diabetes mellitus  
                                                                                            
                    |
|  with hyperglycemia    Chest pain, unspecified  2018 [facility] [place].   
                                                                                            
                    |
| [place] Emergency  Emergency  Chief Complaint: SOB  2018 [facility].   
                                                                                            
                    |
| [place] Emergency  Emergency    FALL    Unspecified fall, initial encounter    Dependence on   
                                                                                            
                    |
| renal dialysis    End stage renal disease  2018 [facility]. [place]    
                                                                                            
                    |
| Emergency  Emergency    FALL    Essential (primary) hypertension    Type 2 diabetes       
                                                                                            
 
                    |
| mellitus with hyperglycemia    Type 2 diabetes mellitus with unspecified complications    
                                                                                            
                    |
|   Unspecified fall, initial encounter    Headache  2018 [facility] LUCYAdryan       
                                                                                            
                    |
| [place]. [place] Emergency  Emergency    -1. Dorsalgia, unspecified    -1. Painful micturition,  
                                                                                            
                    |
|  unspecified    0. Frequency of micturition    1. Urinary tract infection, site not       
                                                                                            
                    |
| specified    6. Type 2 diabetes mellitus with hyperglycemia    7. Type 2 diabetes         
                                                                                            
                    |
| mellitus with diabetic chronic kidney disease    8. End stage renal disease    9.         
                                                                                            
                    |
| Dependence on renal dialysis    10. Shortness of breath    11. Long term (current) use    
                                                                                            
                    |
| of anticoagulants  E.D. Visit Count (12 mo.)Facility Visits Low Acuity [facility]      
                                                                                            
                    |
| Health 17 0 [facility] 2 0 [facility] 3 0 Total 22 0   
                                                                                            
                    |
| Note: Visits indicate total known visits. Medicaid Low Acuity Dx are the number of        
                                                                                            
                    |
| primary diagnoses on the Medicaid's Low Acuity dx list.  Recent Inpatient Visit           
                                                                                            
                    |
| SummaryDate Facility [facility] Type Major Type Diagnoses or Chief Complaint Dec 27,      
                                                                                            
                    |
|  [facility] [place]. [place] Recovery  Inpatient    Peripheral arterial disease,   
                                                                                            
                    |
| osteomyelitis left foot    Other acute osteomyelitis, left ankle and foot    Long term    
                                                                                            
                    |
| (current) use of antibiotics    End stage renal disease    Anemia in chronic kidney       
                                                                                            
                    |
| disease  Dec 5, 2018 [facility] [place]. [place] General Medicine  Inpatient           
                                                                                            
                    |
| Peripheral vascular disease, unspecified    End stage renal disease    Dependence on      
                                                                                            
                    |
| renal dialysis    Long term (current) use of insulin    Other chronic osteomyelitis,      
                                                                                            
                    |
| left ankle and foot    Type 2 diabetes mellitus with diabetic chronic kidney disease      
                                                                                            
                    |
| Essential (primary) hypertension  2018 [facility] [place]. [place] Inpatient   
                                                                                            
 
                    |
|  Inpatient    Upper GIB    Other chronic osteomyelitis, unspecified ankle and foot        
                                                                                            
                    |
| End stage renal disease    Other specified personal risk factors, not elsewhere           
                                                                                            
                    |
| classified    Chronic systolic (congestive) heart failure    Anemia in chronic kidney     
                                                                                            
                    |
| disease    Other disorders of plasma-protein metabolism, not elsewhere classified         
                                                                                            
                    |
| Moderate protein-calorie malnutrition    Other disorders of phosphorus metabolism  Nov    
                                                                                            
                    |
| 2018 [facility] [place]. [place] Medical Surgical  Inpatient    1. Type 2 diabetes  
                                                                                            
                    |
|  mellitus with other specified complication    2. Urinary tract infection, site not       
                                                                                            
                    |
| specified    3. Unspecified protein-calorie malnutrition    4. Other chronic              
                                                                                            
                    |
| osteomyelitis, unspecified site    5. Hypertensive heart and chronic kidney disease with  
                                                                                            
                    |
|  heart failure and with stage 5 chronic kidney disease, or end stage renal disease    6.  
                                                                                            
                    |
|  Chronic diastolic (congestive) heart failure    7. Other osteomyelitis, ankle and foot   
                                                                                            
                    |
|    8. Type 2 diabetes mellitus with foot ulcer    9. Atherosclerotic heart disease of     
                                                                                            
                    |
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 Chronic obstructive pulmonary       
                                                                                            
                    |
| disease, unspecified  2018 [facility] [place]. [place] Medical Surgical          
                                                                                            
                    |
| Inpatient    1. Benign paroxysmal vertigo, unspecified ear    2. End stage renal disease  
                                                                                            
                    |
|     3. Hypertensive chronic kidney disease with stage 5 chronic kidney disease or end     
                                                                                            
                    |
| stage renal disease    4. Urinary tract infection, site not specified    5. Hypertensive  
                                                                                            
                    |
|  heart and chronic kidney disease with heart failure and with stage 5 chronic kidney      
                                                                                            
                    |
| disease, or end stage renal disease    6. Kidney transplant status    7. Unspecified      
                                                                                            
                    |
| systolic (congestive) heart failure    8. Syncope and collapse    9. Type 2 diabetes      
                                                                                            
 
                    |
| mellitus with diabetic chronic kidney disease    10. Atherosclerotic heart disease of     
                                                                                            
                    |
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     
                                                                                            
                    |
| query found no report.Care [facility] Type Phone Fax Service Dates HEADINGS,   
                                                                                            
                    |
| ERNST HENDERSONP. Nurse Practitioner: Family   Current  [facility]co Antonio Martinez /Care       
                                                                                            
                    |
| Coordinator [phone]  2019 - Current  [facility] ([phone])      
                                                                                            
                    |
| [phone]  2019 - Current  [facility] Primary [facility]  ([phone])        
                                                                                            
                    |
| [phone] Current  [facility] Primary Care   Current  MANOLO      
                                                                                            
                    |
| LISA Primary Care   Current  [facility] Provider [phone]  
                                                                                            
                    |
|  [phone] Current  [facility] Case or Care Manager   Current  Willard Crook -             
                                                                                            
                    |
| MIRTA Ortiz Case or Care Manager [phone] Current  Brenda,        
                                                                                            
                    |
| Jairo HARRIS MD Other   Current  Known AliasesNo known aliases. Criteria Met  Care          
                                                                                            
                    |
| Guidelines  10 in 12  3 in 60The above information is provided for the sole purpose of    
                                                                                            
                    |
| patient treatment. Use of this information beyond the terms of Data Sharing Memorandum    
                                                                                            
                    |
| of Understanding and License Agreement is prohibited. In certain cases not all visits     
                                                                                            
                    |
| may be represented. Consult the aforementioned facilities for additional information.     
                                                                                            
                    |
|  [employer] [place], [place] -                         
                                                                                            
                    |
| [email or web address]                                                            
                                                                                            
                    |
|  Peripheral arterial disease, osteomyelitis left foot                                     
                                                                                            
                    |
|   Other acute osteomyelitis, left ankle and foot                                          
                                                                                            
                    |
|   Long term (current) use of antibiotics                                                  
                                                                                            
 
                    |
|   End stage renal disease                                                                 
                                                                                            
                    |
|   Anemia in chronic kidney disease                                                        
                                                                                            
                    |
|                                                                                           
                                                                                            
                    |
|[facility] [place] [facility]  Inpatient                     
                                                                                            
                    |
|  Peripheral vascular disease, unspecified                                                 
                                                                                            
                    |
|   End stage renal disease                                                                 
                                                                                            
                    |
|   Dependence on renal dialysis                                                            
                                                                                            
                    |
|   Long term (current) use of insulin                                                      
                                                                                            
                    |
|   Other chronic osteomyelitis, left ankle and foot                                        
                                                                                            
                    |
|   Type 2 diabetes mellitus with diabetic chronic kidney disease                           
                                                                                            
                    |
|   Essential (primary) hypertension                                                        
                                                                                            
                    |
|                                                                                           
                                                                                            
                    |
|2018 [facility]. [place] Inpatient  Inpatient                           
                                                                                            
                    |
|  Upper GIB                                                                                
                                                                                            
                    |
|   Other chronic osteomyelitis, unspecified ankle and foot                                 
                                                                                            
                    |
|   End stage renal disease                                                                 
                                                                                            
                    |
|   Other specified personal risk factors, not elsewhere classified                         
                                                                                            
                    |
|   Chronic systolic (congestive) heart failure                                             
                                                                                            
                    |
|   Anemia in chronic kidney disease                                                        
                                                                                            
                    |
|   Other disorders of plasma-protein metabolism, not elsewhere classified                  
                                                                                            
 
                    |
|   Moderate protein-calorie malnutrition                                                   
                                                                                            
                    |
|   Other disorders of phosphorus metabolism                                                
                                                                                            
                    |
|                                                                                           
                                                                                            
                    |
|2018 [place]. [place] Medical Surgical  Inpatient                     
                                                                                            
                    |
|  1. Type 2 diabetes mellitus with other specified complication                            
                                                                                            
                    |
|   2. Urinary tract infection, site not specified                                          
                                                                                            
                    |
|   3. Unspecified protein-calorie malnutrition                                             
                                                                                            
                    |
|   4. Other chronic osteomyelitis, unspecified site                                        
                                                                                            
                    |
|   5. 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
onic kidney disease, or end stage renal disease                                             
                    |
|   6. Chronic diastolic (congestive) heart failure                                         
                                                                                            
                    |
|   7. Other osteomyelitis, ankle and foot                                                  
                                                                                            
                    |
|   8. Type 2 diabetes mellitus with foot ulcer                                             
                                                                                            
                    |
|   9. Atherosclerotic heart disease of native coronary artery without angina pectoris      
                                                                                            
                    |
|   10. Chronic obstructive pulmonary disease, unspecified                                  
                                                                                            
                    |
|                                                                                           
                                                                                            
                    |
|2018 [facility] [place] [facility]  Inpatient                      
                                                                                            
                    |
|  1. Benign paroxysmal vertigo, unspecified ear                                            
                                                                                            
                    |
|   2. End stage renal disease                                                              
                                                                                            
                    |
|   3. Hypertensive chronic kidney disease with stage 5 chronic kidney disease or end stage 
renal disease                                                                               
                    |
|   4. Urinary tract infection, site not specified                                          
                                                                                            
 
                    |
|   5. 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
onic kidney disease, or end stage renal disease                                             
                    |
|   6. Kidney transplant status                                                             
                                                                                            
                    |
|   7. Unspecified systolic (congestive) heart failure                                      
                                                                                            
                    |
|   8. Syncope and collapse                                                                 
                                                                                            
                    |
|   9. Type 2 diabetes mellitus with diabetic chronic kidney disease                        
                                                                                            
                    |
|   10. Atherosclerotic heart disease of native coronary artery without angina pectoris     
                                                                                            
                    |
|                                                                                           
                                                                                            
                    |
|                                                                                           
                                                                                            
                    |
|                                                                                           
                                                                                            
                    |
|Prescription Drug Report (12 Mo.)                                                          
                                                                                            
                    |
|PDMP query found no report.                                                                
                                                                                            
                    |
|                                                                                           
                                                                                            
                    |
|Care Providers                                                                             
                                                                                            
                    |
|Provider PRC Type Phone Fax Service Dates                                                  
                                                                                            
                    |
|HEADINGS, SANTIAGO, F.N.P. Nurse Practitioner: Family   Current                                
                                                                                            
                    |
|[facility] Martinez /Care Coordinator [phone]  2019 - Current         
                                                                                            
                    |
|[facility] [phone]  2019 - Current                          
                                                                                            
                    |
|[facility]  [phone] Current                         
                                                                                            
                    |
|[facility] Primary Care   Current                                
                                                                                            
                    |
|[facility] Primary Care   Current                                                        
                                                                                            
 
                    |
|[facility] Mental Health Provider [phone] Current                 
                                                                                            
                    |
|[facility] Case or Care Manager   Current                                                   
                                                                                            
                    |
|MIRTA Goel Case or Care Manager [phone] Current
                                                                                            
                    |
|Jairo Gutierrez MD Other   Current                                                       
                                                                                            
                    |
|                                                                                           
                                                                                            
                    |
|Known Aliases                                                                              
                                                                                            
                    |
|No known aliases.                                                                          
                                                                                            
                    |
|Criteria Met                                                                               
                                                                                            
                    |
|                                                                                           
                                                                                            
                    |
|  Care Guidelines                                                                          
                                                                                            
                    |
|  10 in 12                                                                                 
                                                                                            
                    |
|  3 in 60                                                                                  
                                                                                            
                    |
|                                                                                           
                                                                                            
                    |
|The above information is provided for the sole purpose of patient treatment. Use of this in
formation beyond the terms of Data Sharing Memorandum of Understanding and License Agreement
 is prohibited. In  |
|certain cases not all visits may be represented. Consult the aforementioned facilities for 
additional information.                                                                     
                    |
|2019 [employer] - [place], [place] - [email or web address]                                                                                       
                    |
+-------------------------------------------------------------------------------------------
--------------------------------------------------------------------------------------------
--------------------+
 
 
 
+-------------------+---------+--------------------+--------------+
| Performing        | Address | City/State/Zipcode | Phone Number |
| Organization      |         |                    |              |
+-------------------+---------+--------------------+--------------+
|   ED INFORMATION  |         |                    |              |
 
| EXCHANGE          |         |                    |              |
+-------------------+---------+--------------------+--------------+
 in this encounter
 
 Visit Diagnoses
 
 
+-----------------------------------------------------------------------------------+
| Diagnosis                                                                         |
+-----------------------------------------------------------------------------------+
|   PVD (peripheral vascular disease) (HCC) - Primary                               |
+-----------------------------------------------------------------------------------+
|   Peripheral vascular disease, unspecified                                        |
+-----------------------------------------------------------------------------------+
|   ESRD (end stage renal disease) on dialysis (HCC)                                |
+-----------------------------------------------------------------------------------+
|   End stage renal disease                                                         |
+-----------------------------------------------------------------------------------+
|   Anemia in ESRD (end-stage renal disease) (HCC)                                  |
+-----------------------------------------------------------------------------------+
|   Anemia in chronic kidney disease                                                |
+-----------------------------------------------------------------------------------+
|   Hypoalbuminemia                                                                 |
+-----------------------------------------------------------------------------------+
|   Other disorders of plasma protein metabolism                                    |
+-----------------------------------------------------------------------------------+
|   Electrolyte imbalance risk                                                      |
+-----------------------------------------------------------------------------------+
|   Other specified conditions influencing health status                            |
+-----------------------------------------------------------------------------------+
|   ESRD (end stage renal disease) (HCC)                                            |
+-----------------------------------------------------------------------------------+
|   End stage renal disease                                                         |
+-----------------------------------------------------------------------------------+
|   CAD (coronary artery disease)                                                   |
+-----------------------------------------------------------------------------------+
|   Coronary atherosclerosis of unspecified type of vessel, native or graft         |
+-----------------------------------------------------------------------------------+
|   Paroxysmal atrial fibrillation (HCC)                                            |
+-----------------------------------------------------------------------------------+
|   Atrial fibrillation                                                             |
+-----------------------------------------------------------------------------------+
|   COPD (chronic obstructive pulmonary disease) (HCC)                              |
+-----------------------------------------------------------------------------------+
|   Chronic airway obstruction, not elsewhere classified                            |
+-----------------------------------------------------------------------------------+
|   Chronic systolic congestive heart failure (HCC)                                 |
+-----------------------------------------------------------------------------------+
|   Chronic systolic heart failure                                                  |
+-----------------------------------------------------------------------------------+
|   Hypertension, essential                                                         |
+-----------------------------------------------------------------------------------+
|   Unspecified essential hypertension                                              |
+-----------------------------------------------------------------------------------+
|   Type 2 diabetes mellitus with chronic kidney disease on chronic dialysis, with  |
| long-term current use of insulin (HCC)                                            |
+-----------------------------------------------------------------------------------+
 
 
 
 
 Admitting Diagnoses
 
 
+----------------------------------------------------+
| Diagnosis                                          |
+----------------------------------------------------+
|   PVD (peripheral vascular disease) (HCC)          |
+----------------------------------------------------+
|   Peripheral vascular disease, unspecified         |
+----------------------------------------------------+
|   ESRD (end stage renal disease) on dialysis (HCC) |
+----------------------------------------------------+
|   End stage renal disease                          |
+----------------------------------------------------+
 
 
 
 Administered Medications
 
 
+------------------+--------+---------+------+------+------+
| Medication Order | MAR    | Action  | Dose | Rate | Site |
|                  | Action | Date    |      |      |      |
+------------------+--------+---------+------+------+------+
 
 
 
+-----------------------------------+---+
|   acetaminophen (TYLENOL)         |   |
| suppository 650 mg  650 mg,       |   |
| Rectal, Every 6 Hours PRN, Mild   |   |
| Pain (1-3), Fever, Starting Wed   |   |
| 19 at 1910                   |   |
+-----------------------------------+---+
|                                   |   |
+-----------------------------------+---+
|   acetaminophen (TYLENOL) tablet  |   |
| 650 mg  650 mg, Oral, Every 6     |   |
| Hours PRN, Mild Pain (1-3),       |   |
| Fever, Starting Wed 19 at    |   |
| 1910                              |   |
+-----------------------------------+---+
|                                   |   |
+-----------------------------------+---+
 
 
 
+-----------------------------------+-------+----------+--------+---+---+
|   apixaban (ELIQUIS) tablet 2.5   | Given |  | 2.5 mg |   |   |
| mg  2.5 mg, Oral, 2 Times Daily,  |       | 9 14:01  |        |   |   |
| First dose on Thu 19 at 1200 |       | PST      |        |   |   |
+-----------------------------------+-------+----------+--------+---+---+
 
 
 
+-------+----------+--------+---+---+
| Given |  | 2.5 mg |   |   |
|       | 9 20:51  |        |   |   |
|       | PST      |        |   |   |
+-------+----------+--------+---+---+
 
| Given | 1/25/201 | 2.5 mg |   |   |
|       | 9 08:18  |        |   |   |
|       | PST      |        |   |   |
+-------+----------+--------+---+---+
 
 
 
+---+---+
|   |   |
+---+---+
 
 
 
+-----------------------------------+-------+----------+-------+---+---+
|   aspirin chewable tablet 81 mg   | Given |  | 81 mg |   |   |
| 81 mg, Oral, Daily With           |       | 9 10:01  |       |   |   |
| Breakfast, First dose on Fri      |       | PST      |       |   |   |
| 19 at 0930                   |       |          |       |   |   |
+-----------------------------------+-------+----------+-------+---+---+
 
 
 
+---+---+
|   |   |
+---+---+
 
 
 
+-----------------------------------+-------+----------+-------+---+---+
|   aspirin EC tablet 81 mg  81 mg, | Given |  | 81 mg |   |   |
|  Oral, Daily With Breakfast,      |       | 9 14:00  |       |   |   |
| First dose on u 19 at 0800 |       | PST      |       |   |   |
+-----------------------------------+-------+----------+-------+---+---+
 
 
 
+-------+----------+-------+---+---+
| Given |  | 81 mg |   |   |
|       | 9 08:18  |       |   |   |
|       | PST      |       |   |   |
+-------+----------+-------+---+---+
 
 
 
+---+---+
|   |   |
+---+---+
 
 
 
+-----------------------------------+-------+----------+-------+---+---+
|   atorvastatin (LIPITOR) tablet   | Given |  | 40 mg |   |   |
| 40 mg  40 mg, Oral, Nightly,      |       | 9 21:59  |       |   |   |
| First dose on Wed 19 at 2200 |       | PST      |       |   |   |
+-----------------------------------+-------+----------+-------+---+---+
 
 
 
+-------+----------+-------+---+---+
| Given |  | 40 mg |   |   |
 
|       | 9 20:51  |       |   |   |
|       | PST      |       |   |   |
+-------+----------+-------+---+---+
 
 
 
+---+---+
|   |   |
+---+---+
 
 
 
+----------------------------------+-------+----------+--------+---+---+
|   calcium acetate (PHOSLO)       | Given |  | 667 mg |   |   |
| capsule 667 mg  667 mg, Oral, 3  |       | 9 17:07  |        |   |   |
| Times Daily With Meals, First    |       | PST      |        |   |   |
| dose on 19 at 1930      |       |          |        |   |   |
+----------------------------------+-------+----------+--------+---+---+
 
 
 
+-------+----------+--------+---+---+
| Given |  | 667 mg |   |   |
|       | 9 08:17  |        |   |   |
|       | PST      |        |   |   |
+-------+----------+--------+---+---+
| Given |  | 667 mg |   |   |
|       | 9 12:33  |        |   |   |
|       | PST      |        |   |   |
+-------+----------+--------+---+---+
 
 
 
+---+---+
|   |   |
+---+---+
 
 
 
+-----------------------------------+-------+----------+---------+---+---+
|   carvedilol (COREG) tablet 12.5  | Given |  | 12.5 mg |   |   |
| mg  12.5 mg, Oral, 2 Times Daily  |       | 9 21:59  |         |   |   |
| With Meals, First dose on Wed     |       | PST      |         |   |   |
| 19 at 1930                   |       |          |         |   |   |
+-----------------------------------+-------+----------+---------+---+---+
 
 
 
+-------+----------+---------+---+---+
| Given |  | 12.5 mg |   |   |
|       | 9 17:07  |         |   |   |
|       | PST      |         |   |   |
+-------+----------+---------+---+---+
| Given |  | 12.5 mg |   |   |
|       | 9 08:17  |         |   |   |
|       | PST      |         |   |   |
+-------+----------+---------+---+---+
 
 
 
 
+-----------------------------------+---+
|                                   |   |
+-----------------------------------+---+
|   cefTAZidime (FORTAZ) 2 g in     |   |
| sodium chloride (IV) 0.9 % 50 mL  |   |
| IVPB  2 g, Intravenous,           |   |
| Administer over 30 Minutes, After |   |
|  Hemodialysis (see admin          |   |
| instructions), Starting Wed       |   |
| 19 at 1327                   |   |
+-----------------------------------+---+
|                                   |   |
+-----------------------------------+---+
 
 
 
+-----------------------------------+-------+----------+-----+-------+---+
|   cefTAZidime (FORTAZ) 2 g in     | Given |  | 2 g | 100   |   |
| sodium chloride (IV) 0.9 % 50 mL  |       | 9 20:55  |     | mL/hr |   |
| IVPB  2 g, Intravenous,           |       | PST      |     |       |   |
| Administer over 30 Minutes, Once, |       |          |     |       |   |
|  Wed 19 at 1930, For 1 dose  |       |          |     |       |   |
+-----------------------------------+-------+----------+-----+-------+---+
 
 
 
+---+---+
|   |   |
+---+---+
 
 
 
+-----------------------------------+-------+----------+--------+---+---+
|   cholecalciferol (VITAMIN D-3)   | Given |  | 5,000  |   |   |
| tablet 5,000 Units  5,000 Units,  |       | 9 21:58  | Units  |   |   |
| Oral, Daily, First dose on Wed    |       | PST      |        |   |   |
| 19 at 1930                   |       |          |        |   |   |
+-----------------------------------+-------+----------+--------+---+---+
 
 
 
+-------+----------+--------+---+---+
| Given |  | 5,000  |   |   |
|       | 9 14:00  | Units  |   |   |
|       | PST      |        |   |   |
+-------+----------+--------+---+---+
| Given |  | 5,000  |   |   |
|       | 9 08:18  | Units  |   |   |
|       | PST      |        |   |   |
+-------+----------+--------+---+---+
 
 
 
+---+---+
|   |   |
+---+---+
 
 
 
+-----------------------------------+-------+----------+-------+---+---+
 
|   clopidogrel (PLAVIX) tablet 75  | Given |  | 75 mg |   |   |
| mg  75 mg, Oral, Daily, First     |       | 9 14:01  |       |   |   |
| dose on Thu 19 at 0900       |       | PST      |       |   |   |
+-----------------------------------+-------+----------+-------+---+---+
 
 
 
+-------+----------+-------+---+---+
| Given |  | 75 mg |   |   |
|       | 9 08:17  |       |   |   |
|       | PST      |       |   |   |
+-------+----------+-------+---+---+
 
 
 
+---+---+
|   |   |
+---+---+
 
 
 
+----------------------------------+-------+----------+---------+---+---+
|   diphenhydrAMINE (BENADRYL)     | Given |  | 12.5 mg |   |   |
| injection 12.5 mg  12.5 mg,      |       | 9 09:56  |         |   |   |
| Intravenous, Every 6 Hours PRN,  |       | PST      |         |   |   |
| Allergic Reaction, Itching,      |       |          |         |   |   |
| Starting Fri 19 at 0842     |       |          |         |   |   |
+----------------------------------+-------+----------+---------+---+---+
 
 
 
+---+---+
|   |   |
+---+---+
 
 
 
+---------------------------------+-------+----------+---------+---+---+
|   epoetin byron (PROCRIT)        | Given |  | 10,000  |   |   |
| injection 10,000 Units  10,000  |       | 9 10:07  | Units   |   |   |
| Units, Intravenous, Once In     |       | PST      |         |   |   |
| Dialysis, Thu 19 at 0900,  |       |          |         |   |   |
| For 1 dose, DIALYSIS            |       |          |         |   |   |
+---------------------------------+-------+----------+---------+---+---+
 
 
 
+---+---+
|   |   |
+---+---+
 
 
 
+----------------------------------+-------+----------+-------+---+---+
|   famotidine (PEPCID) tablet 10  | Given |  | 10 mg |   |   |
| mg  10 mg, Oral, Daily, First    |       | 9 22:00  |       |   |   |
| dose on Wed 19 at 1930      |       | PST      |       |   |   |
+----------------------------------+-------+----------+-------+---+---+
 
 
 
 
+-------+----------+-------+---+---+
| Given |  | 10 mg |   |   |
|       | 9 14:01  |       |   |   |
|       | PST      |       |   |   |
+-------+----------+-------+---+---+
| Given |  | 10 mg |   |   |
|       | 9 08:17  |       |   |   |
|       | PST      |       |   |   |
+-------+----------+-------+---+---+
 
 
 
+-----------------------------------+---+
|                                   |   |
+-----------------------------------+---+
|   fentaNYL (SUBLIMAZE) injection  |   |
| 25 mcg  25 mcg, Intravenous,      |   |
| Every 1 Hour PRN, Moderate Pain   |   |
| (4-6), Starting 19 at    |   |
| 1910                              |   |
+-----------------------------------+---+
|                                   |   |
+-----------------------------------+---+
|   fentaNYL (SUBLIMAZE) injection  |   |
| 50 mcg  50 mcg, Intravenous,      |   |
| Every 1 Hour PRN, Severe Pain     |   |
| (7-10), Starting 19 at   |   |
| 1910                              |   |
+-----------------------------------+---+
|                                   |   |
+-----------------------------------+---+
 
 
 
+-----------------------------------+-------+----------+--------+---+---+
|   fentaNYL (SUBLIMAZE) injection  | Given |  | 50 mcg |   |   |
|  Once PRN, Starting 19   |       | 9 18:00  |        |   |   |
| at 1800, Intra-procedure          |       | PST      |        |   |   |
| (CATH/IR)                         |       |          |        |   |   |
+-----------------------------------+-------+----------+--------+---+---+
 
 
 
+---+---+
|   |   |
+---+---+
 
 
 
+-----------------------------------+-------+----------+--------+---+---+
|   heparin (porcine) 1000 UNIT/ML  | Given |  | 5,000  |   |   |
| injection  Once PRN, Starting Wed |       | 9 18:23  | Units  |   |   |
|  19 at 1823, Intra-procedure |       | PST      |        |   |   |
|  (CATH/IR)                        |       |          |        |   |   |
+-----------------------------------+-------+----------+--------+---+---+
 
 
 
+---+---+
 
|   |   |
+---+---+
 
 
 
+----------------------------------+-------+----------+----------+---+---+
|   HYDROcodone-acetaminophen      | Given |  | 1 tablet |   |   |
| (NORCO)  MG per tablet 1   |       | 9 11:09  |          |   |   |
| tablet  1 tablet, Oral, Every 4  |       | PST      |          |   |   |
| Hours PRN, Severe Pain (7-10),   |       |          |          |   |   |
| Starting Wed 19 at 1910     |       |          |          |   |   |
+----------------------------------+-------+----------+----------+---+---+
 
 
 
+-------+----------+----------+---+---+
| Given |  | 1 tablet |   |   |
|       | 9 20:51  |          |   |   |
|       | PST      |          |   |   |
+-------+----------+----------+---+---+
 
 
 
+---+---+
|   |   |
+---+---+
 
 
 
+----------------------------------+-------+----------+----------+---+---+
|   HYDROcodone-acetaminophen      | Given |  | 1 tablet |   |   |
| (NORCO) 5-325 MG per tablet 1    |       | 9 06:26  |          |   |   |
| tablet  1 tablet, Oral, Every 4  |       | PST      |          |   |   |
| Hours PRN, Moderate Pain (4-6),  |       |          |          |   |   |
| Starting Wed 19 at 1910     |       |          |          |   |   |
+----------------------------------+-------+----------+----------+---+---+
 
 
 
+-------+----------+----------+---+---+
| Given |  | 1 tablet |   |   |
|       | 9 09:56  |          |   |   |
|       | PST      |          |   |   |
+-------+----------+----------+---+---+
 
 
 
+---+---+
|   |   |
+---+---+
 
 
 
+-----------------------------------+-------+----------+--------+---+----------+
|   HYDROmorphone (DILAUDID)        | Given |  | 0.5 mg |   | Left Arm |
| injection 0.5 mg  0.5 mg,         |       | 9 15:55  |        |   |          |
| Intravenous, Once, Wed 19 at |       | PST      |        |   |          |
|  1508, For 1 dose                 |       |          |        |   |          |
+-----------------------------------+-------+----------+--------+---+----------+
 
 
 
 
+-----------------------------------+---+
|                                   |   |
+-----------------------------------+---+
|   HYDROmorphone (DILAUDID)        |   |
| injection 0.5 mg  0.5 mg,         |   |
| Intravenous, Every 3 Hours PRN,   |   |
| Moderate Pain (4-6), Starting Wed |   |
|  19 at 1910                  |   |
+-----------------------------------+---+
|                                   |   |
+-----------------------------------+---+
|   HYDROmorphone (DILAUDID)        |   |
| injection 1 mg  1 mg,             |   |
| Intravenous, Every 3 Hours PRN,   |   |
| Severe Pain (7-10), Starting Wed  |   |
| 19 at 1910                   |   |
+-----------------------------------+---+
|                                   |   |
+-----------------------------------+---+
 
 
 
+-----------------------------------+-------+----------+----------+---+---+
|   insulin glargine (LANTUS)       | Given |  | 15 Units |   |   |
| injection 15 Units  15 Units,     |       | 9 22:07  |          |   |   |
| Subcutaneous, Nightly, First dose |       | PST      |          |   |   |
|  on Wed 19 at 2200           |       |          |          |   |   |
+-----------------------------------+-------+----------+----------+---+---+
 
 
 
+-------+----------+----------+---+---+
| Given |  | 15 Units |   |   |
|       | 9 22:39  |          |   |   |
|       | PST      |          |   |   |
+-------+----------+----------+---+---+
 
 
 
+---+---+
|   |   |
+---+---+
 
 
 
+-----------------------------------+-------+----------+---------+---+---+
|   insulin lispro (human)          | Given |  | 1 Units |   |   |
| (HUMALOG) injection 0-3 Units     |       | 9 22:39  |         |   |   |
| 0-3 Units, Subcutaneous, Nightly, |       | PST      |         |   |   |
|  First dose on 19 at     |       |          |         |   |   |
| 2200                              |       |          |         |   |   |
+-----------------------------------+-------+----------+---------+---+---+
 
 
 
+---+---+
|   |   |
+---+---+
 
 
 
 
+-----------------------------------+-------+----------+---------+---+---+
|   insulin lispro (human)          | Given |  | 1 Units |   |   |
| (HUMALOG) injection 0-6 Units     |       | 9 17:12  |         |   |   |
| 0-6 Units, Subcutaneous, 3 Times  |       | PST      |         |   |   |
| Daily Before Meals, First dose on |       |          |         |   |   |
|  19 at 1930              |       |          |         |   |   |
+-----------------------------------+-------+----------+---------+---+---+
 
 
 
+-------+----------+---------+---+---+
| Given |  | 2 Units |   |   |
|       | 9 12:34  |         |   |   |
|       | PST      |         |   |   |
+-------+----------+---------+---+---+
 
 
 
+---+---+
|   |   |
+---+---+
 
 
 
+---------------------------------+-------+----------+--------+---+---+
|   iohexol (OMNIPAQUE 240) 240   | Given |  | 53 mLs |   |   |
| mg/mL injection 53 mL  53 mL,   |       | 9 18:34  |        |   |   |
| Intra-arterial, Img Once PRN,   |       | PST      |        |   |   |
| Other, Starting Wed 19 at  |       |          |        |   |   |
| 1853, For 1 dose                |       |          |        |   |   |
+---------------------------------+-------+----------+--------+---+---+
 
 
 
+---+---+
|   |   |
+---+---+
 
 
 
+-----------------------------------+-------+----------+-------+---+---+
|   isosorbide mononitrate (IMDUR)  | Given |  | 60 mg |   |   |
| 24 hr tablet 60 mg  60 mg, Oral,  |       | 9 14:02  |       |   |   |
| Daily, First dose on Thu 19  |       | PST      |       |   |   |
| at 0900                           |       |          |       |   |   |
+-----------------------------------+-------+----------+-------+---+---+
 
 
 
+-------+----------+-------+---+---+
| Given |  | 60 mg |   |   |
|       | 9 08:18  |       |   |   |
|       | PST      |       |   |   |
+-------+----------+-------+---+---+
 
 
 
 
+---+---+
|   |   |
+---+---+
 
 
 
+----------------------------------+-------+----------+--------+---+---+
|   lidocaine 1 % injection  Once  | Given |  | 10 mLs |   |   |
| PRN, During PCI per physician,   |       | 9 18:01  |        |   |   |
| Starting Wed 19 at 1801,    |       | PST      |        |   |   |
| Intra-procedure (CATH/IR)        |       |          |        |   |   |
+----------------------------------+-------+----------+--------+---+---+
 
 
 
+---+---+
|   |   |
+---+---+
 
 
 
+-----------------------------------+-------+----------+------+---+---+
|   LORazepam (ATIVAN) tablet 1 mg  | Given |  | 1 mg |   |   |
|  1 mg, Oral, Every 8 Hours PRN,   |       | 9 23:26  |      |   |   |
| Anxiety, Starting 19 at  |       | PST      |      |   |   |
| 1910                              |       |          |      |   |   |
+-----------------------------------+-------+----------+------+---+---+
 
 
 
+-------+----------+------+---+---+
| Given |  | 1 mg |   |   |
|       | 9 08:39  |      |   |   |
|       | PST      |      |   |   |
+-------+----------+------+---+---+
 
 
 
+---+---+
|   |   |
+---+---+
 
 
 
+----------------------------------+-------+----------+--------+---+---+
|   metroNIDAZOLE (FLAGYL) tablet  | Given |  | 500 mg |   |   |
| 500 mg  500 mg, Oral, Every 8    |       | 9 22:39  |        |   |   |
| Hours Scheduled (3 times per     |       | PST      |        |   |   |
| day), First dose on 19  |       |          |        |   |   |
| at 1400, Indications: Purulent   |       |          |        |   |   |
| Skin and Soft Tissue Infection   |       |          |        |   |   |
+----------------------------------+-------+----------+--------+---+---+
 
 
 
+-------+----------+--------+---+---+
| Given |  | 500 mg |   |   |
|       | 9 05:57  |        |   |   |
|       | PST      |        |   |   |
+-------+----------+--------+---+---+
 
| Given |  | 500 mg |   |   |
|       | 9 14:01  |        |   |   |
|       | PST      |        |   |   |
+-------+----------+--------+---+---+
 
 
 
+---+---+
|   |   |
+---+---+
 
 
 
+----------------------------------+-------+----------+------+---+---+
|   midazolam (VERSED) injection   | Given |  | 1 mg |   |   |
| Intravenous, Once PRN, Starting  |       | 9 18:00  |      |   |   |
| Wed 19 at 1800,             |       | PST      |      |   |   |
| Intra-procedure (CATH/IR)        |       |          |      |   |   |
+----------------------------------+-------+----------+------+---+---+
 
 
 
+-------+----------+------+---+---+
| Given | 1/23/201 | 1 mg |   |   |
|       | 9 18:23  |      |   |   |
|       | PST      |      |   |   |
+-------+----------+------+---+---+
 
 
 
+---+---+
|   |   |
+---+---+
 
 
 
+-----------------------------------+-------+----------+-------+---+---+
|   nortriptyline (PAMELOR) capsule | Given |  | 25 mg |   |   |
|  25 mg  25 mg, Oral, Every        |       | 9 08:18  |       |   |   |
| Morning, First dose on u        |       | PST      |       |   |   |
| 19 at 0900                   |       |          |       |   |   |
+-----------------------------------+-------+----------+-------+---+---+
 
 
 
+---+---+
|   |   |
+---+---+
 
 
 
+-----------------------------------+-------+----------+-------+---+---+
|   nortriptyline (PAMELOR) capsule | Given |  | 75 mg |   |   |
|  75 mg  75 mg, Oral, Nightly,     |       | 9 22:07  |       |   |   |
| First dose on 19 at 2200 |       | PST      |       |   |   |
+-----------------------------------+-------+----------+-------+---+---+
 
 
 
+-------+----------+-------+---+---+
 
| Given |  | 75 mg |   |   |
|       | 9 20:52  |       |   |   |
|       | PST      |       |   |   |
+-------+----------+-------+---+---+
 
 
 
+---+---+
|   |   |
+---+---+
 
 
 
+-----------------------------------+-------+----------+------+---+---+
|   ondansetron (ZOFRAN) injection  | Given |  | 4 mg |   |   |
| 4 mg  4 mg, Intravenous, Every 6  |       | 9 12:38  |      |   |   |
| Hours PRN, Nausea, Vomiting,      |       | PST      |      |   |   |
| Starting Wed 19 at 1910      |       |          |      |   |   |
+-----------------------------------+-------+----------+------+---+---+
 
 
 
+-------+----------+------+---+---+
| Given |  | 4 mg |   |   |
|       | 9 22:39  |      |   |   |
|       | PST      |      |   |   |
+-------+----------+------+---+---+
 
 
 
+-----------------------------------+---+
|                                   |   |
+-----------------------------------+---+
|   ondansetron (ZOFRAN-ODT)        |   |
| disintegrating tablet 4 mg  4 mg, |   |
|  Oral, Every 6 Hours PRN, Nausea, |   |
|  Vomiting, Starting 19   |   |
| at 1910                           |   |
+-----------------------------------+---+
|                                   |   |
+-----------------------------------+---+
 
 
 
+-----------------------------------+-------+----------+--------+---+---+
|   oxybutynin (DITROPAN) tablet    | Given |  | 2.5 mg |   |   |
| 2.5 mg  2.5 mg, Oral, 2 Times     |       | 9 21:59  |        |   |   |
| Daily, First dose on 19  |       | PST      |        |   |   |
| at 2100                           |       |          |        |   |   |
+-----------------------------------+-------+----------+--------+---+---+
 
 
 
+-------+----------+--------+---+---+
| Given |  | 2.5 mg |   |   |
|       | 9 20:51  |        |   |   |
|       | PST      |        |   |   |
+-------+----------+--------+---+---+
| Given |  | 2.5 mg |   |   |
|       | 9 08:18  |        |   |   |
 
|       | PST      |        |   |   |
+-------+----------+--------+---+---+
 
 
 
+---+---+
|   |   |
+---+---+
 
 
 
+-----------------------------------+-------+----------+-------+---+---+
|   pantoprazole (PROTONIX) EC      | Given |  | 40 mg |   |   |
| tablet 40 mg  40 mg, Oral, Every  |       | 9 06:26  |       |   |   |
| Morning Before Breakfast, First   |       | PST      |       |   |   |
| dose on Thu 19 at 0630       |       |          |       |   |   |
+-----------------------------------+-------+----------+-------+---+---+
 
 
 
+-------+----------+-------+---+---+
| Given |  | 40 mg |   |   |
|       | 9 05:57  |       |   |   |
|       | PST      |       |   |   |
+-------+----------+-------+---+---+
 
 
 
+---+---+
|   |   |
+---+---+
 
 
 
+-----------------------------------+-------+----------+---------+---+---+
|   rOPINIRole (REQUIP) tablet 0.75 | Given |  | 0.75 mg |   |   |
|  mg  0.75 mg, Oral, Nightly,      |       | 9 21:58  |         |   |   |
| First dose on 19 at 2200 |       | PST      |         |   |   |
+-----------------------------------+-------+----------+---------+---+---+
 
 
 
+-------+----------+---------+---+---+
| Given |  | 0.75 mg |   |   |
|       | 9 20:51  |         |   |   |
|       | PST      |         |   |   |
+-------+----------+---------+---+---+
 
 
 
+---+---+
|   |   |
+---+---+
 
 
 
+-----------------------------------+-------+----------+---+---+---+
|   silver sulfADIAZINE (SILVADENE) | Given |  |   |   |   |
|  1 % cream  Topical, 2 Times      |       | 9 13:00  |   |   |   |
| Daily, First dose on 19  |       | PST      |   |   |   |
 
| at 0930, On the right foot and    |       |          |   |   |   |
| cover with  telfa roll gauze and  |       |          |   |   |   |
| secure with  Tape.                |       |          |   |   |   |
+-----------------------------------+-------+----------+---+---+---+
 
 
 
+---+---+
|   |   |
+---+---+
 
 
 
+-----------------------------------+-------+----------+--------+---+---+
|   sodium chloride (PF) 0.9 %      | Given |  | 10 mLs |   |   |
| flush 10 mL  10 mL, Intravenous,  |       | 9 11:30  |        |   |   |
| Every 8 Hours, First dose on Wed  |       | PST      |        |   |   |
| 19 at 1930                   |       |          |        |   |   |
+-----------------------------------+-------+----------+--------+---+---+
 
 
 
+---+---+
|   |   |
+---+---+
 
 
 
+----------------------------------+---------+----------+-------+---+---+
|   vancomycin (VANCOCIN) 1500     | New Bag |  | 1.5 g |   |   |
| mg/250 mL IVPB  1.5 g,           |         | 9 18:12  |       |   |   |
| Intravenous, Administer over 90  |         | PST      |       |   |   |
| Minutes, Once, Wed 19 at    |         |          |       |   |   |
| 1432, For 1 dose                 |         |          |       |   |   |
+----------------------------------+---------+----------+-------+---+---+
 
 
 
+-----------------------------------+---+
|                                   |   |
+-----------------------------------+---+
|   vancomycin (VANCOCIN) 500 mg in |   |
|  sodium chloride (IV) 0.9 % 100   |   |
| mL IVPB  500 mg, Intravenous,     |   |
| Administer over 60 Minutes, See   |   |
| Admin Instructions, Starting Wed  |   |
| 19 at 1910, Administer dose  |   |
| during last hour or immediately   |   |
| following each Hemodialysis       |   |
| session. Use Rx button to message |   |
|  pharmacy for dose.               |   |
+-----------------------------------+---+
|                                   |   |
+-----------------------------------+---+
 
 
 
+-----------------------------------+-------+----------+--------+-------+---+
|   vancomycin (VANCOCIN) 500 mg in | Given |  | 500 mg | 100   |   |
|  sodium chloride (IV) 0.9 % 100   |       | 9 12:34  |        | mL/hr |   |
 
| mL IVPB  500 mg, Intravenous,     |       | PST      |        |       |   |
| Administer over 60 Minutes, Once, |       |          |        |       |   |
|  Fri 19 at 1230, For 1 dose  |       |          |        |       |   |
+-----------------------------------+-------+----------+--------+-------+---+
 
 
 
+---+---+
|   |   |
+---+---+
 in this encounter

## 2019-04-09 NOTE — XMS
Encounter Summary
  Created on: 2019
 
 Cherie Villalobos
 External Reference #: [id number]
 : 48
 Sex: Female
 
 Demographics
 
 
+-----------------------+--------------------------+
| Address               | [address]               |
|                       | [place]  [address] |
+-----------------------+--------------------------+
| Home Phone            | [phone]          |
+-----------------------+--------------------------+
| Preferred Language    | Unknown                  |
+-----------------------+--------------------------+
| Marital Status        |                   |
+-----------------------+--------------------------+
| Christianity Affiliation | 1013                     |
+-----------------------+--------------------------+
| Race                  | Unknown                  |
+-----------------------+--------------------------+
| Ethnic Group          | Unknown                  |
+-----------------------+--------------------------+
 
 
 Author
 
 
+--------------+-----------------------+
| Author       | Sherry TYT ([employer]) |
+--------------+-----------------------+
| Organization | [facility] |
+--------------+-----------------------+
| Address      | Unknown               |
+--------------+-----------------------+
| Phone        | Unavailable           |
+--------------+-----------------------+
 
 
 
 Support
 
 
+---------------+--------------+---------------------+-----------------+
| Name          | Relationship | Address             | Phone           |
+---------------+--------------+---------------------+-----------------+
| Anoop Villalobos | ECON         | [place], | [phone] |
|               |              |  [place]  [address]     |                 |
+---------------+--------------+---------------------+-----------------+
| Jennifer Dickson | ECON         | [place]       | [phone] |
|               |              | [phone]               |                 |
+---------------+--------------+---------------------+-----------------+
 
 
 
 
 Care Team Providers
 
 
+-----------------------+------+-----------------+
| Care Team Member Name | Role | Phone           |
+-----------------------+------+-----------------+
| Ivy Couch DO      | PCP  | [phone] |
+-----------------------+------+-----------------+
 
 
 
 Reason for Visit
 
 
+-----------+----------------------------------+
| Reason    | Comments                         |
+-----------+----------------------------------+
| Follow-up | PAD (peripheral artery disease)  |
+-----------+----------------------------------+
 
 
 
 Encounter Details
 
 
+--------+---------+----------------------+----------------------+----------------------+
| Date   | Type    | Department           | Care Team            | Description          |
+--------+---------+----------------------+----------------------+----------------------+
| / | Office  |   [facility]      |   Kirt Mclaughlin MD     | Steal syndrome as    |
| 2019   | Visit   | Vascular Surgery     | [address]  | complication of      |
|        |         | [address] |  [place] [address]  | dialysis access,     |
|        |         |  E  [place], [place]     |  [phone]        | sequela (Primary     |
|        |         | [address]           | [phone] (Fax)   | Dx); ESRD (end stage |
|        |         | [phone]         |                      |  renal disease)      |
|        |         |                      |                      | (HCC); PAD           |
|        |         |                      |                      | (peripheral artery   |
|        |         |                      |                      | disease) ([facility])       |
+--------+---------+----------------------+----------------------+----------------------+
 
 
 
 Social History
 
 
+---------------+------------+-----------+--------+------------------+
| Tobacco Use   | Types      | Packs/Day | Years  | Date             |
|               |            |           | Used   |                  |
+---------------+------------+-----------+--------+------------------+
| Former Smoker | Cigarettes | 1         | 30     | Quit: 2004 |
+---------------+------------+-----------+--------+------------------+
 
 
 
+---------------------+---+---+---+
| Smokeless Tobacco:  |   |   |   |
| Never Used          |   |   |   |
+---------------------+---+---+---+
 
 
 
 
+------------------------------+
| Comments: quite smoking  |
+------------------------------+
 
 
 
+-------------+-----------+---------+----------+
| Alcohol Use | Drinks/We | oz/Week | Comments |
|             | ek        |         |          |
+-------------+-----------+---------+----------+
| No          |           |         |          |
+-------------+-----------+---------+----------+
 
 
 
+------------------+---------------+
| Sex Assigned at  | Date Recorded |
| Birth            |               |
+------------------+---------------+
| Not on file      |               |
+------------------+---------------+
 as of this encounter
 
 Last Filed Vital Signs
 
 
+-------------------+---------+-------------------------+
| Vital Sign        | Reading | Time Taken              |
+-------------------+---------+-------------------------+
| Blood Pressure    | 130/60  | 2019  2:36 PM PST |
+-------------------+---------+-------------------------+
| Pulse             | 77      | 2019  2:36 PM PST |
+-------------------+---------+-------------------------+
| Temperature       | -       | -                       |
+-------------------+---------+-------------------------+
| Respiratory Rate  | -       | -                       |
+-------------------+---------+-------------------------+
| Oxygen Saturation | 100%    | 2019  2:36 PM PST |
+-------------------+---------+-------------------------+
| Inhaled Oxygen    | -       | -                       |
| Concentration     |         |                         |
+-------------------+---------+-------------------------+
| Weight            | -       | -                       |
+-------------------+---------+-------------------------+
| Height            | -       | -                       |
+-------------------+---------+-------------------------+
| Body Mass Index   | -       | -                       |
+-------------------+---------+-------------------------+
 in this encounter
 
 Progress Notes
 Kirt Mclaughlin MD - 2019  3:00 PM PSTFormatting of this note may be different from the o
aleksandar.
 Ms. Villalobos is a pleasant 68 y.o. female with PMH significant for acute MI, anemia, atrial f
ibrillation, COPD, CAD, diabetes, ESRD, hyperlipidemia, and hypertension, who presents today
 for wound follow up. Patient states she was in the hospital recently, she had an XR of her 
right great toe which showed a fracture. The area is healing. She also complains of pain and
 some discoloration of her left 2nd finger. Patient reports she has been having continued pa
in in her right toe, and at the site of her left forefoot amputation. Patient adds that she 
 
has not been able to follow up with her primary care physician as she is only in the office 
on , and the weather has made it difficult to get into town. 
 
 Results: X-ray foot right 3+ views 
 Impression 
 Linear lucency/irregularity at the distal 1st phalanx, may represent 
 minimally displaced fracture. 
 Signed by: Oleksandr Lemus 
 Sign Date/Time: 2019 10:20 AM 
 
 Past Medical History 
 Diagnosis Date 
   Acute MI ([facility])  
  with stenting x 1 
   Anemia associated with chronic renal failure 2016 
   Atrial fibrillation ([facility])  
   Chronic kidney disease  
   Congestive heart failure ([facility])  
   COPD (chronic obstructive pulmonary disease) ([facility])  
   COPD (chronic obstructive pulmonary disease) ([facility]) 2018 
   Coronary artery disease  
  stent placements: 3 total 
   Depression  
   Diabetes other 
  abstracted 
   Diabetes mellitus, type 2 ([facility])  
   ESRD on peritoneal dialysis ([facility])  
   GERD (gastroesophageal reflux disease)  
   Hemodialysis patient ([facility]) 10/2016 
  Stopped peritoneal and restarted hemodialysis 
   Hemorrhage of gastrointestinal tract, unspecified 2017 
  low GI, rectal bleeding 
   High blood pressure other 
  abstracted 
   High cholesterol other 
  abstracted 
   Hyperlipidemia  
   Hypertension  
   Hyponatremia 2017 
   Myocardial infarction ([facility]) 2017 
   Other chronic pain  
  feet,  
   Pain of left hip joint 2016 
  due to hip fracture and replacement 
   Partial small bowel obstruction ([facility]) 2017 
  resolved 
   Renal failure  
  Stage 5.   Fistula in the JAN - not on dialysis yet. 
   Unspecified visual disturbance  
  glasses 
 
 Past Surgical History 
 Procedure Laterality Date 
   AV FISTULA PLACEMENT   
  left upper arm AV fistula - failed 
   AV FISTULA REPAIR N/A 10/25/2016 
  Procedure: AV FISTULA - GRAFT REPAIR/REVISION;  Surgeon: Kirt Mclaughlin MD;  Location: [facility]
IN OR;  Service: Vascular;  Laterality: N/A;  Superficialization and revision of left arm fi
stula 
   CARDIAC CATHETERIZATION  2017 
 
   CARPAL TUNNEL RELEASE Bilateral other 
  abstracted 
   CATARACT EXTRACTION Bilateral  
   CATHETER REMOVAL N/A 2016 
  Procedure: DIALYSIS CATHETER - REMOVAL;  Surgeon: Kirt Mclaughlin MD;  Location: [facility] OR; 
 Service: Vascular;  Laterality: N/A;  PD cath removal 
   CHOLECYSTECTOMY  other 
  abstracted 
   COLONOSCOPY   
   CORONARY ARTERY BYPASS GRAFT   
   CORONARY STENT PLACEMENT  2017 
  Drug Elutin stents to OM, 1 stent to prox. LAD 
   EYE SURGERY   
   FOOT AMPUTATION Left 2018 
  Procedure: FOREFOOT - AMPUTATION;  Surgeon: Srinivasan Jordan DPM;  Location: [facility] MAIN OR;
  Service: Podiatry;  Laterality: Left; 
   HARDWARE PRESENT   
  stent placements x 3, Left hip replacement, vascular stents 2 in left leg 
   HIP ARTHROPLASTY Left 2016 
  Procedure: HIP - ARTHROPLASTY(ALLISON);  Surgeon: Uriel Roa MD;  Location: [facility] MAIN 
OR;  Service: Orthopedics;  Laterality: Left; 
   HYSTERECTOMY   
  complete 
   PACEMAKER INSERTION   
  [employer] pacemaker/defib 
   PERITONEAL CATHETER INSERTION  8/15/2013 
   PERITONEAL CATHETER INSERTION N/A 2016 
  Procedure: LAPAROSCOPIC - PERITONEAL DIALYSIS CATH INSERTION;  Surgeon: Kirt Mclaughlin MD;  Lo
cation: [facility] MAIN OR;  Service: Vascular;  Laterality: N/A;  Removal of old catheter 
   SHOULDER SURGERY Right  
  frozen shoulder 
   UNLISTED PROCEDURE ARTHROSCOPY   
  total Left hip 
   vascular stents Left 2017 
  leg (2 stents) 
   VASCULAR SURGERY   
 
 Social History 
 Substance Use Topics 
   Smoking status: Former Smoker 
   Packs/day: 1.00 
   Years: 30.00 
   Types: Cigarettes 
   Quit date: 2004 
   Smokeless tobacco: Never Used 
    Comment: quite smoking  
   Alcohol use No 
 
 Family History 
 Problem Relation Age of Onset 
   High cholesterol Father  
   Hypertension Father  
   Diabetes Paternal Grandmother  
   Maljoann hypertherm Neg Hx  
   Kidney disease Neg Hx  
 
 Current Outpatient Prescriptions on File Prior to Visit 
 Medication Sig Dispense Refill 
   albuterol (PROVENTIL HFA;VENTOLIN HFA) 108 (90 Base) MCG/ACT inhaler Inhale 2 puffs int
o the lungs every 4 (four) hours as needed for Wheezing.   
 
   apixaban (ELIQUIS) 2.5 MG tablet Take 1 tablet by mouth 2 (two) times daily. 60 tablet 
0 
   atorvastatin (LIPITOR) 80 MG tablet Take 80 mg by mouth nightly.   
   carvedilol (COREG) 12.5 MG tablet Take 1 tablet by mouth 2 (two) times daily with meals
. 60 tablet 0 
   esomeprazole (NEXIUM) 40 MG capsule Take 1 capsule by mouth every day   
   HYDROcodone-acetaminophen (NORCO) 5-325 MG per tablet Take 1 tablet by mouth every 4 (f
our) hours as needed. 30 tablet 0 
   insulin aspart (NOVOLOG) 100 UNIT/ML injection Inject  into the skin 3 (three) times da
melina before meals. Sliding scale   
   insulin degludec (TRESIBA) 100 UNIT/ML injection Inject 21 units every night   
   isosorbide mononitrate (IMDUR) 60 MG 24 hr tablet Take 1 tablet by mouth daily. 30 tabl
et 1 
   LORazepam (ATIVAN) 1 MG tablet Take 0.5 tablets by mouth every 8 (eight) hours as neede
d for Anxiety. Patient states she has the 1mg tablets at home and that they are scored and s
he will split them in half   
   losartan (COZAAR) 100 MG tablet Take 100 mg by mouth daily.   
   nitroGLYCERIN (NITROSTAT) 0.4 MG SL tablet Place 1 tablet under the tongue every 5 (fiv
e) minutes as needed for Chest pain. 30 tablet 0 
   nortriptyline (PAMELOR) 25 MG capsule Take 25-75 mg by mouth See Admin Instructions. Ta
kes 25 mg by mouth every morning and 75 mg every night   
   ondansetron (ZOFRAN-ODT) 4 MG disintegrating tablet Take 4 mg by mouth every 8 (eight) 
hours as needed.   
   oxybutynin (DITROPAN) 5 MG tablet Take 7.5 mg by mouth 2 (two) times daily.   
   prochlorperazine 5 MG tablet TAKE ONE TABLET BY MOUTH TWICE DAILY AS NEEDED FOR NAUSEA 
60 tablet 11 
   rOPINIRole (REQUIP) 0.25 MG tablet Take 0.75 mg by mouth nightly.   
   TRINTELLIX 20 MG TABS Take 20 mg by mouth every evening.   
 
 No current facility-administered medications on file prior to visit.  
 
 Allergies 
 Allergen Reactions 
   Digitoxin Other (See Comments) 
   Toxic, patient states her eyes were also affected "she seen yellow everywhere" 
   Morphine Mental Changes, Other (See Comments) and Anxiety 
   "makes me feel jittery"
 Other reaction(s): MAKES HER "CRAZY"
 Hallucinations
 Make her crazy/ "funny feeling in my head"
 Has used hydromorphone in-house 
   Penicillin G Hives 
   Penicillins Rash and Hives 
   Other reaction(s): RASH
 Tolerates cephalosporins 
   Quinapril Hcl Anaphylaxis 
   Quinapril Hcl Angioedema 
   Facial Edema 
   Digoxin And Related Other (See Comments) 
   toxic 
   Metaxalone Other (See Comments) 
   Pantoprazole Sodium Nausea and Vomiting 
   Quinapril Hcl Edema 
   Facial Edema 
   Sudafed [Pseudoephedrine Hcl] Rash 
 
 Comprehensive ROS performed and pertinent items described in the HPI. 
 
 Vitals:reviewed
 CONSTITUTIONAL:  Conversant, well developed, NAD
 
 EYES: Anicteric sclerae, no lid drag, no proptosis
 RESP: Normal effort, regular, even, unlabored rate
 CV: No peripheral edema, rate regular
 SKIN: Pink, warm, dry without rash/lesion
 MS: ROM not limited, no digital cyanosis, normal gait
 NEURO: Cranial nerves II-XII grossly intact, A&O times 3
 PSYCH: appropriate affect, speech and tone, judgement and insight intact
 Vascular:  Left AV fistula with a strong palpable thrill.  Toe wound healing well.  
 
 Discussed with the patient that her toe appears to be healing and there is no need for vasc
ular intervention on her right lower extremity. Educated the patient that her finger pain is
 likely the result of her fistula diverting too much blood from her hand. I advised the sylvester
ent to do her best to prevent any injury to her hand, in order to prevent ulcerations. The p
atient will need a fistulogram in the near future, which she can call the office to schedule
. All questions and concerns addressed. Patient will follow up after a fistulogram. Patient 
understands and is agreeable. 
 
 Attending Note: Documentation assistance provided by Steven Mike (David). Information r
ecorded by the [email or web address] has been reviewed and validated by me. I agree with its contents.
 
 Signed by: David Chavis 19, 3:07 PM
 
 Kirt Mclaughlin MD
 
 in this encounter
 
 Plan of Treatment
 
 
+--------+---------+-----------+----------------------+-------------+
| Date   | Type    | Specialty | Care Team            | Description |
+--------+---------+-----------+----------------------+-------------+
| 04/10/ | Office  | Podiatry  |   Srinivasan Jordan,  |             |
| 2019   | Visit   |           | DPM  [address], |             |
|        |         |           |  [address]  [place],  |             |
|        |         |           | [place] [address]             |             |
|        |         |           | [phone]         |             |
|        |         |           | [phone] (Fax)   |             |
+--------+---------+-----------+----------------------+-------------+
 as of this encounter
 
 Visit Diagnoses
 
 
+------------------------------------------------------------------------+
| Diagnosis                                                              |
+------------------------------------------------------------------------+
|   Steal syndrome as complication of dialysis access, sequela - Primary |
+------------------------------------------------------------------------+
|   ESRD (end stage renal disease) (HCC)                                 |
+------------------------------------------------------------------------+
|   End stage renal disease                                              |
+------------------------------------------------------------------------+
|   PAD (peripheral artery disease) (HCC)                                |
+------------------------------------------------------------------------+
|   Unspecified disorders of arteries and arterioles                     |
+------------------------------------------------------------------------+

## 2019-04-09 NOTE — XMS
Encounter Summary
  Created on: 2019
 
 Cherie Villalobos
 External Reference #: [id number]
 : 48
 Sex: Female
 
 Demographics
 
 
+-----------------------+--------------------------+
| Address               | [address]               |
|                       | [place]  [address] |
+-----------------------+--------------------------+
| Home Phone            | [phone]          |
+-----------------------+--------------------------+
| Preferred Language    | Unknown                  |
+-----------------------+--------------------------+
| Marital Status        |                   |
+-----------------------+--------------------------+
| Scientology Affiliation | 1013                     |
+-----------------------+--------------------------+
| Race                  | Unknown                  |
+-----------------------+--------------------------+
| Ethnic Group          | Unknown                  |
+-----------------------+--------------------------+
 
 
 Author
 
 
+--------------+-----------------------+
| Author       | Sherry Seriosity |
+--------------+-----------------------+
| Organization | [facility] |
+--------------+-----------------------+
| Address      | Unknown               |
+--------------+-----------------------+
| Phone        | Unavailable           |
+--------------+-----------------------+
 
 
 
 Support
 
 
+---------------+--------------+---------------------+-----------------+
| Name          | Relationship | Address             | Phone           |
+---------------+--------------+---------------------+-----------------+
| Anoop Villalobos | ECON         | [place], | [phone] |
|               |              |  [place]  [address]     |                 |
+---------------+--------------+---------------------+-----------------+
| Jennifer Dickson | ECON         | [place]       | [phone] |
|               |              | [phone]               |                 |
+---------------+--------------+---------------------+-----------------+
 
 
 
 
 Care Team Providers
 
 
+-----------------------+------+-----------------+
| Care Team Member Name | Role | Phone           |
+-----------------------+------+-----------------+
| Ivy Couch DO      | PCP  | [phone] |
+-----------------------+------+-----------------+
 
 
 
 Reason for Visit
 
 
+--------+------------------------------------------+
| Reason | Comments                                 |
+--------+------------------------------------------+
| Other  | Requesting status on form for shoe order |
+--------+------------------------------------------+
 
 
 
 Encounter Details
 
 
+--------+-----------+----------------------+----------------------+---------------------+
| Date   | Type      | Department           | Care Team            | Description         |
+--------+-----------+----------------------+----------------------+---------------------+
| / | Telephone |   [facility] |   Srinivasan Jordan,  | Other (Requesting   |
| 2019   |           |  and Ankle  [address]      | DPM  [address], | status on form for  |
|        |           | [address]   |  [address]  [place],  | shoe order)         |
|        |           | [place], [place]         | [place] [address]             |                     |
|        |           | [address]           | [phone]         |                     |
|        |           | [phone]         | [phone] (Fax)   |                     |
+--------+-----------+----------------------+----------------------+---------------------+
 
 
 
 Social History
 
 
+---------------+------------+-----------+--------+------------------+
| Tobacco Use   | Types      | Packs/Day | Years  | Date             |
|               |            |           | Used   |                  |
+---------------+------------+-----------+--------+------------------+
| Former Smoker | Cigarettes | 1         | 30     | Quit: 2004 |
+---------------+------------+-----------+--------+------------------+
 
 
 
+---------------------+---+---+---+
| Smokeless Tobacco:  |   |   |   |
| Never Used          |   |   |   |
+---------------------+---+---+---+
 
 
 
+------------------------------+
| Comments: quite smoking  |
 
+------------------------------+
 
 
 
+-------------+-----------+---------+----------+
| Alcohol Use | Drinks/We | oz/Week | Comments |
|             | ek        |         |          |
+-------------+-----------+---------+----------+
| No          |           |         |          |
+-------------+-----------+---------+----------+
 
 
 
+------------------+---------------+
| Sex Assigned at  | Date Recorded |
| Birth            |               |
+------------------+---------------+
| Not on file      |               |
+------------------+---------------+
 as of this encounter
 
 Plan of Treatment
 
 
+--------+---------+-----------+----------------------+-------------+
| Date   | Type    | Specialty | Care Team            | Description |
+--------+---------+-----------+----------------------+-------------+
| 04/10/ | Office  | Podiatry  |   Srinivasan Jordan,  |             |
|    | Visit   |           | DPM  [address], |             |
|        |         |           |  [address]  [facility],  |             |
|        |         |           | [place] [address]             |             |
|        |         |           | [phone]         |             |
|        |         |           | [phone] (Fax)   |             |
+--------+---------+-----------+----------------------+-------------+
 as of this encounter
 
 Visit Diagnoses
 Not on filein this encounter

## 2019-04-09 NOTE — XMS
Encounter Summary
  Created on: 2019
 
 Cherie Villalobos
 External Reference #: [id number]
 : 48
 Sex: Female
 
 Demographics
 
 
+-----------------------+--------------------------+
| Address               | [address]               |
|                       | [place]  [address] |
+-----------------------+--------------------------+
| Home Phone            | [phone]          |
+-----------------------+--------------------------+
| Preferred Language    | Unknown                  |
+-----------------------+--------------------------+
| Marital Status        |                   |
+-----------------------+--------------------------+
| Pentecostal Affiliation | [id number]                     |
+-----------------------+--------------------------+
| Race                  | Unknown                  |
+-----------------------+--------------------------+
| Ethnic Group          | Unknown                  |
+-----------------------+--------------------------+
 
 
 Author
 
 
+--------------+-----------------------+
| Author       | Sherry SilMach |
+--------------+-----------------------+
| Organization | [facility] [employer] |
+--------------+-----------------------+
| Address      | Unknown               |
+--------------+-----------------------+
| Phone        | Unavailable           |
+--------------+-----------------------+
 
 
 
 Support
 
 
+---------------+--------------+---------------------+-----------------+
| Name          | Relationship | Address             | Phone           |
+---------------+--------------+---------------------+-----------------+
| Anoop Villalobos | ECON         | [place], | [phone] |
|               |              |  [place]  [address]     |                 |
+---------------+--------------+---------------------+-----------------+
| Jennifer Dickson | ECON         | [place]       | [phone] |
|               |              | [id number]               |                 |
+---------------+--------------+---------------------+-----------------+
 
 
 
 
 Care Team Providers
 
 
+-----------------------+------+-----------------+
| Care Team Member Name | Role | Phone           |
+-----------------------+------+-----------------+
| Ivy Couch DO      | PCP  | [phone] |
+-----------------------+------+-----------------+
 
 
 
 Reason for Visit
 
 
+---------------------+----------+
| Reason              | Comments |
+---------------------+----------+
| Chest Pain          |          |
+---------------------+----------+
| Shortness of Breath |          |
+---------------------+----------+
| Nausea              |          |
+---------------------+----------+
 Auth/Cert
 
+--------+--------+-----------+--------------+--------------+---------------+
| Status | Reason | Specialty | Diagnoses /  | Referred By  | Referred To   |
|        |        |           | Procedures   | Contact      | Contact       |
+--------+--------+-----------+--------------+--------------+---------------+
|        |        | Internal  |   Diagnoses  |              |   [facility]    |
|        |        | Medicine  |  Elevated    |              | [address]   |
|        |        |           | blood        |              | [address]
|        |        |           | pressure     |              |  [address]   |
|        |        |           | reading  S/P |              | [place], [place]  |
|        |        |           |  MVR (mitral |              | [address]  Phone: |
|        |        |           |  valve       |              |  [phone] |
|        |        |           | repair)  Hx  |              |   Fax:        |
|        |        |           | of CABG      |              | [phone]  |
|        |        |           | Chest pain   |              |               |
|        |        |           | in adult     |              |               |
|        |        |           |              |              |               |
+--------+--------+-----------+--------------+--------------+---------------+
 
 
 
 
 Encounter Details
 
 
+--------+-----------+----------------------+----------------------+----------------------+
| Date   | Type      | Department           | Care Team            | Description          |
+--------+-----------+----------------------+----------------------+----------------------+
| / | Emergency |   [facility]    |   Nathaniel De Luna, | Hx of CABG (Primary  |
| 2019 - |           | [facility]   |  DO  [address]  | Dx); Chest pain in   |
|        |           | [facility] |  EMERGENCY           | adult; S/P MVR       |
| / |           |   [address]     | DEPARTMENT           | (mitral valve        |
|    |           | [place] [address]   | [place] [address]   | repair); Elevated    |
|        |           | [phone]         | [phone]         | blood pressure       |
|        |           |                      | [phone] (Fax)   | reading; Chronic     |
 
|        |           |                      | Negro Lr, DO    | systolic congestive  |
|        |           |                      | [address]  [address]  | heart failure (HCC); |
|        |           |                      | [address]           |  Chest pain,         |
|        |           |                      | [place] [address]   | unspecified type     |
|        |           |                      | [phone]         |                      |
|        |           |                      | [phone] (Fax)   |                      |
|        |           |                      | Silas Ferrara MD  [address]                      |
|        |           |                      |  [address]  [address]     |                      |
|        |           |                      | [address]           |                      |
|        |           |                      | [place] [address]   |                      |
|        |           |                      | [phone]         |                      |
|        |           |                      | [phone] (Fax)   |                      |
+--------+-----------+----------------------+----------------------+----------------------+
 
 
 
 Social History
 
 
+---------------+------------+-----------+--------+------------------+
| Tobacco Use   | Types      | Packs/Day | Years  | Date             |
|               |            |           | Used   |                  |
+---------------+------------+-----------+--------+------------------+
| Former Smoker | Cigarettes | 1         | 30     | Quit: 2004 |
+---------------+------------+-----------+--------+------------------+
 
 
 
+---------------------+---+---+---+
| Smokeless Tobacco:  |   |   |   |
| Never Used          |   |   |   |
+---------------------+---+---+---+
 
 
 
+------------------------------+
| Comments: quite smoking  |
+------------------------------+
 
 
 
+-------------+-----------+---------+----------+
| Alcohol Use | Drinks/We | oz/Week | Comments |
|             | ek        |         |          |
+-------------+-----------+---------+----------+
| No          |           |         |          |
+-------------+-----------+---------+----------+
 
 
 
+------------------+---------------+
| Sex Assigned at  | Date Recorded |
| Birth            |               |
+------------------+---------------+
| Not on file      |               |
+------------------+---------------+
 as of this encounter
 
 Last Filed Vital Signs
 
 
 
+-------------------+----------------------+-------------------------+
| Vital Sign        | Reading              | Time Taken              |
+-------------------+----------------------+-------------------------+
| Blood Pressure    | 108/56               | 2019 11:46 AM PST |
+-------------------+----------------------+-------------------------+
| Pulse             | 68                   | 2019 11:46 AM PST |
+-------------------+----------------------+-------------------------+
| Temperature       | 36.5   C (97.7   F)  | 2019 11:46 AM PST |
+-------------------+----------------------+-------------------------+
| Respiratory Rate  | 20                   | 2019 11:46 AM PST |
+-------------------+----------------------+-------------------------+
| Oxygen Saturation | 92%                  | 2019 11:46 AM PST |
+-------------------+----------------------+-------------------------+
| Inhaled Oxygen    | -                    | -                       |
| Concentration     |                      |                         |
+-------------------+----------------------+-------------------------+
| Weight            | 65.5 kg (144 lb 6.4  | 2019  3:16 AM PST |
|                   | oz)                  |                         |
+-------------------+----------------------+-------------------------+
| Height            | 177.8 cm (5' 10")    | 2019  1:52 PM PST |
+-------------------+----------------------+-------------------------+
| Body Mass Index   | 20.72                | 2019  3:16 AM PST |
+-------------------+----------------------+-------------------------+
 in this encounter
 
 Discharge Summaries
 Silas Ferrara MD - 2019  8:20 AM PSTFormatting of this note may be different from the 
original.
 [facility]
 Service:  Hospitalist
 Discharge Summary
 
 Date of Admission:  2019
 Date of Discharge:   2019
 
 Discharge Physician:  Silas Ferrara MD
 Treatment Team: 
 Admitting Provider: Negro Lr DO
 Discharge Diagnoses:   
 
 Principal Problem:
   Chest pain
 Active Problems:
   Coronary artery disease involving native coronary artery of native heart without angina p
ectoris
   ESRD (end stage renal disease) ([facility])
   Type 2 diabetes mellitus with chronic kidney disease on chronic dialysis, with long-term 
current use of insulin (HCC)
   Anemia in ESRD (end-stage renal disease) ([facility])
   Hypertension, essential
   Pleural effusion
   Chronic systolic congestive heart failure (HCC)
   Chronic diarrhea
   Chronic anticoagulation
   Cardiomyopathy (HCC)
   COPD (chronic obstructive pulmonary disease) ([facility])
   ICD (implantable cardioverter-defibrillator) in place
   Paroxysmal atrial fibrillation (HCC)
   Chronic multifocal osteomyelitis of left foot ([facility])
 
   PVD (peripheral vascular disease) ([facility])
 Resolved Problems:
   * No resolved hospital problems. *
 
 Procedures:  * No surgery found *
 
 Significant Diagnostic Studies:  No results found.
 
 BRIEF HISTORY OF PRESENTATION:  
      Cherie Villalobos is a 68 y.o. female who presented per H&P"from [facility] of chest pain that started last night about midnight and lasted until about 5 AM this morn
ing, pressure, intense, nonradiating, with nausea and dyspnea, no sweating. Improved with SL
 NTG initially, but the NTG's became less effective as she took more of them (5 total). Got 
a NTG patch at [facility] ED which was very effective in controlling the pain. She has ve
ry complicated cardiac history including CAD, CABG, systolic CHF, ischemic cardiomyopathy, p
acemaker, valve repair, afib, anticoagulation, with several other comorbidities including CO
PD, ESRD on HD, DM2, PVD. She has a cardiologist in [place] but [facility] refused to
 call that doctor to aid in decision-making and simply transferred the patient to [facility] ED.
 
 The patient was at [facility] a month ago for L foot toe amputation due to osteomyelitis. Dr Fr correia is her surgeon. During that hospitalization she c/o chest pain with troponin elevation
 to 0.318 and was evaluated by cardiology. Stress test a month prior had been normal. It was
 decided that medical management was the only reasonable option for the patient, given her e
xtensive comorbidities.
 
 Post-amputation she has been instructed to be strict NWB on LLE, and she was sent to [facility]. She remains on abx post-procedure managed by Dr Elliott. The patient is convinced that [facility] is not administering her meds correctly. She thinks they might be skipping her Eliqui
s, clopidogrel, and carvedilol. This happened before, earlier in 2018. She says the nurses dakota miller that the meds are given but they are not given. She does not want to return to [facility],
 if at all possible, and wants to return home with paid caregiver if that will comply with STEVEN Jordan's post-op instructions"
 HOSPITAL COURSE:  
 She was seen by  and he recc to stop all IV abx.
 She was discharged home with full time care giver. 
 She was seen by  and he recc medical management 
 Patient was discharged in stable condition. Addressed all of their questions and covered wi
th side affects of newly added medications. she was cleared per consulting services.
 
 Past Medical History 
 Diagnosis Date 
   Acute MI (HCC)  
  with stenting x 1 
   Anemia associated with chronic renal failure 2016 
   Atrial fibrillation (HCC)  
   Chronic kidney disease  
   Congestive heart failure (HCC)  
   COPD (chronic obstructive pulmonary disease) (HCC)  
   COPD (chronic obstructive pulmonary disease) ([facility]) 2018 
   Coronary artery disease  
  stent placements: 3 total 
   Depression  
   Diabetes other 
  abstracted 
   Diabetes mellitus, type 2 ([facility])  
   ESRD on peritoneal dialysis ([facility])  
   GERD (gastroesophageal reflux disease)  
   Hemodialysis patient ([facility]) 10/2016 
  Stopped peritoneal and restarted hemodialysis 
   Hemorrhage of gastrointestinal tract, unspecified 2017 
 
  low GI, rectal bleeding 
   High blood pressure other 
  abstracted 
   High cholesterol other 
  abstracted 
   Hyperlipidemia  
   Hypertension  
   Hyponatremia 2017 
   Myocardial infarction ([facility]) 2017 
   Other chronic pain  
  feet,  
   Pain of left hip joint 2016 
  due to hip fracture and replacement 
   Partial small bowel obstruction ([facility]) 2017 
  resolved 
   Renal failure  
  Stage 5.   Fistula in the JAN - not on dialysis yet. 
   Unspecified visual disturbance  
  glasses 
 
 Past Surgical History 
 Procedure Laterality Date 
   AV FISTULA PLACEMENT   
  left upper arm AV fistula - failed 
   AV FISTULA REPAIR N/A 10/25/2016 
  Procedure: AV FISTULA - GRAFT REPAIR/REVISION;  Surgeon: Kirt Mclaughlin MD;  Location: [facility]
IN OR;  Service: Vascular;  Laterality: N/A;  Superficialization and revision of left arm fi
stula 
   CARDIAC CATHETERIZATION  2017 
   CARPAL TUNNEL RELEASE Bilateral other 
  abstracted 
   CATARACT EXTRACTION Bilateral  
   CATHETER REMOVAL N/A 2016 
  Procedure: DIALYSIS CATHETER - REMOVAL;  Surgeon: Kirt Mclaughlin MD;  Location: [facility] MAIN OR; 
 Service: Vascular;  Laterality: N/A;  PD cath removal 
   CHOLECYSTECTOMY  other 
  abstracted 
   COLONOSCOPY   
   CORONARY ARTERY BYPASS GRAFT   
   CORONARY STENT PLACEMENT  2017 
  Drug Elutin stents to OM, 1 stent to prox. LAD 
   EYE SURGERY   
   FOOT AMPUTATION Left 2018 
  Procedure: FOREFOOT - AMPUTATION;  Surgeon: Srinivasan Jordan DPM;  Location: [facility] MAIN OR;
  Service: Podiatry;  Laterality: Left; 
   HARDWARE PRESENT   
  stent placements x 3, Left hip replacement, vascular stents 2 in left leg 
   HIP ARTHROPLASTY Left 2016 
  Procedure: HIP - ARTHROPLASTY(ALLISON);  Surgeon: Uriel Roa MD;  Location: [facility] MAIN 
OR;  Service: Orthopedics;  Laterality: Left; 
   HYSTERECTOMY  1998 
  complete 
   PACEMAKER INSERTION   
  [employer] pacemaker/defib 
   PERITONEAL CATHETER INSERTION  8/15/2013 
   PERITONEAL CATHETER INSERTION N/A 2016 
  Procedure: LAPAROSCOPIC - PERITONEAL DIALYSIS CATH INSERTION;  Surgeon: Kirt Mclaughlin MD;  Lo
cation: [facility] MAIN OR;  Service: Vascular;  Laterality: N/A;  Removal of old catheter 
   SHOULDER SURGERY Right  
  frozen shoulder 
 
   UNLISTED PROCEDURE ARTHROSCOPY   
  total Left hip 
   vascular stents Left 2017 
  leg (2 stents) 
   VASCULAR SURGERY   
 
 Allergies 
 Allergen Reactions 
   Quinapril Hcl Anaphylaxis 
   Digoxin And Related Other (See Comments) 
   toxic 
   Metaxalone Other (See Comments) 
   Morphine Mental Changes 
   Make her crazy/ "funny feeling in my head"
 Has used hydromorphone in-house 
   Pantoprazole Sodium Nausea and Vomiting 
   Penicillins Rash 
   Quinapril Hcl Edema 
   Facial Edema 
   Sudafed [Pseudoephedrine Hcl] Rash 
 
 No prescriptions prior to admission. 
 
 DISCHARGE EXAM
 Vital Signs:
 /56 (BP Location: Right upper arm)  | Pulse 68  | Temp 97.7 F (36.5 C) (Axillary)
  | Resp 20  | Ht 1.778 m (5' 10")  | Wt 65.5 kg (144 lb 6.4 oz)  | SpO2 92%  | BMI 20.72 kg
/m 
 General appearance: alert, appears stated age, cooperative, fatigued and no distress
 Head: Normocephalic, without obvious abnormality, atraumatic
 Neck: no adenopathy, no carotid bruit, no JVD, supple, symmetrical, trachea midline and thy
roid not enlarged, symmetric, no tenderness/mass/nodules
 Lungs: clear to auscultation bilaterally
 Heart: regular rate and rhythm, S1, S2 normal, no murmur, click, rub or gallop
 Abdomen: soft, non-tender; bowel sounds normal; no masses,  no organomegaly
 Extremities: left leg boot 
 Pulses: 2+ and symmetric
 Neurologic: Grossly normal AXO3 non focal 
 
 DATA
 
 CBC:  
 Lab Results 
 Component Value Date 
  WBC 4.08 2019 
  RBC 2.83 (L) 2019 
  HGB 9.7 (L) 2019 
  HCT 29.7 (L) 2019 
  .0 (H) 2019 
  MCH 34.2 (H) 2019 
  MCHC 32.5 2019 
  RDW 64.3 (H) 2019 
   (L) 2019 
  MPV 9.5 2019 
  DIFFTYPE MANUAL 2019 
 
 CMP:  
 Lab Results 
 Component Value Date 
   2019 
 
  K 4.4 2019 
   2019 
  CO2 28 2019 
  ANIONGAP 14 2019 
  GLUF 153 (H) 2019 
  BUN 15 2019 
  CREATININE 4.9 (H) 2019 
  BCR 3 2019 
  CA 9.6 2019 
  CA 8.7 2016 
  PROT 6.1 (L) 2019 
  ALB 2.6 (L) 2019 
  GLOB 3.5 2019 
  BILITOT 0.5 2019 
  ALP 85 2019 
  AST 22 2019 
  ALT 22 2019 
  EGFR 9 (L) 2019 
 
 Lab Results 
 Component Value Date 
  CKTOTAL 22 (L) 2019 
  CKMB 1.8 2019 
  CKMBINDEX 8.2 2019 
  TROPONINI 0.061 2019 
 
 Disposition:  Home
 Condition:  Stable
 Code Status:  Prior
 
 Discharge Instructions
 Diet Renal 
 
 Diet Low Sodium 
 
 Activity as Advised by Physical Therapy 
 
 Call MD for: Temperature > 100.4F (38C) 
 
 Call MD for:  Persistant Nausea and Vomiting 
 
 Call MD for:  Severe Uncontrolled Pain 
 
 Call MD for:  Redness, Tenderness, or Signs of Infection (Pain, Swelling, Redness, Odor or 
Green/Yellow Discharge Around Incision Site) 
 
 Call MD for:  Hives 
 
 Call MD for:  Difficulty Breathing, Headache or Visual Disturbances 
 
 Call MD for:  Persistant Dizziness or Light-Headedness 
 
 Call MD for: Extreme Fatigue 
 
 Follow up:
 Ivy Couch, DO
 [address], [address]
 [facility] [id number] [phone]
 
 
 Schedule an appointment as soon as possible for a visit in 1 week
 
 Andrés Elliott MD
 [address] [facility] [id number] [phone]
 
 Schedule an appointment as soon as possible for a visit in 1 week
 
 Srinivasan Jordan DPM
 [address] [facility] [facility] [address], [address]
 [facility] [id number] [phone]
 
 Schedule an appointment as soon as possible for a visit in 2 weeks
 
 Celestino Porras MD
 [address]  [facility] [address]
 [facility] [id number] [phone]
 
 As needed
 
  
 Medication List 
  
 CHANGE how you take these medications  
 carvedilol 12.5 MG tablet
 QTY:  60 tablet
 Refills:  0
 Doctor's comments:  Hold for SBP less than 100
 Hold for HR less than 60
 Commonly known as:  COREG
 Take 1 tablet by mouth 2 (two) times daily with meals.
 What changed:
  medication strength
  how much to take
  
 LORazepam 1 MG tablet
 QTY:  30 tablet
 Refills:  0
 Commonly known as:  ATIVAN
 Take 1 tablet by mouth every 8 (eight) hours as needed for Anxiety.
 What changed:  when to take this
  
 losartan 25 MG tablet
 QTY:  30 tablet
 Refills:  0
 Commonly known as:  COZAAR
 Take 1 tablet by mouth daily.
 What changed:  how much to take
  
  
 CONTINUE taking these medications  
 * albuterol 108 (90 Base) MCG/ACT inhaler
 Refills:  0
 Commonly known as:  PROVENTIL HFA;VENTOLIN HFA
  
 * VENTOLIN  (90 Base) MCG/ACT inhaler
 Refills:  0
 
 Generic drug:  albuterol
  
 apixaban 2.5 MG tablet
 QTY:  60 tablet
 Refills:  0
 Commonly known as:  ELIQUIS
 Take 1 tablet by mouth 2 (two) times daily.
  
 aspirin 81 MG EC tablet
 QTY:  30 tablet
 Refills:  0
 Take 1 tablet by mouth daily with breakfast.
  
 atorvastatin 40 MG tablet
 QTY:  30 tablet
 Refills:  0
 Commonly known as:  LIPITOR
 Take 1 tablet by mouth nightly.
  
 calcium acetate 667 MG capsule
 Refills:  0
 Commonly known as:  PHOSLO
  
 clopidogrel 75 MG tablet
 QTY:  30 tablet
 Refills:  11
 Commonly known as:  PLAVIX
 Take 1 tablet by mouth daily.
  
 esomeprazole 20 MG capsule
 Refills:  0
 Commonly known as:  NEXIUM
  
 HYDROcodone-acetaminophen 5-325 MG per tablet
 QTY:  30 tablet
 Refills:  0
 Commonly known as:  NORCO
 Take 1 tablet by mouth every 4 (four) hours as needed.
  
 insulin aspart 100 UNIT/ML injection
 Refills:  0
 Commonly known as:  NOVOLOG
  
 insulin degludec 100 UNIT/ML injection
 Refills:  0
 Commonly known as:  TRESIBA
  
 isosorbide mononitrate 60 MG 24 hr tablet
 QTY:  30 tablet
 Refills:  1
 Commonly known as:  IMDUR
 Take 1 tablet by mouth daily.
  
 loperamide 2 MG capsule
 Refills:  0
 Commonly known as:  IMODIUM
  
 nitroGLYCERIN 0.4 MG SL tablet
 QTY:  30 tablet
 Refills:  0
 
 Doctor's comments:  Hold for SBP less than 100
 Commonly known as:  NITROSTAT
 Place 1 tablet under the tongue every 5 (five) minutes as needed for Chest pain.
  
 nortriptyline 25 MG capsule
 Refills:  0
 Commonly known as:  PAMELOR
  
 ondansetron 4 MG disintegrating tablet
 Refills:  0
 Commonly known as:  ZOFRAN-ODT
  
 oxybutynin 5 MG tablet
 Refills:  0
 Commonly known as:  DITROPAN
  
 prochlorperazine 5 MG tablet
 Refills:  0
  
 ranitidine 150 MG tablet
 Refills:  0
 Commonly known as:  ZANTAC
  
 rOPINIRole 0.25 MG tablet
 Refills:  0
 Commonly known as:  REQUIP
  
 SLOW-MAG 71.5-119 MG Tbec
 Refills:  0
 Generic drug:  Magnesium Cl-Calcium Carbonate
  
 Vitamin D3 5000 units Caps
 Refills:  0
  
 Vortioxetine HBr 10 MG Tabs
 Refills:  0
  
  
 * This list has 2 medication(s) that are the same as other medications prescribed for you. 
Read the directions carefully, and ask your doctor or other care provider to review them wit
h you. 
  
  
  
 You might also be taking other medications not listed above. If you have questions about an
y of your other medications, talk to the person who prescribed them or your Primary Care Pro
vider. 
  
  
  
 STOP taking these medications  
 CefTAZidime and Dextrose 2-5 GM-%(50ML) Solr
  
 furosemide 20 MG tablet
 Commonly known as:  LASIX
  
 vancomycin 1000 mg injection
 Commonly known as:  VANCOCIN
  
  
 
  
 Where to Get Your Medications 
  
 You can get these medications from any pharmacy  
 Bring a paper prescription for each of these medications
  carvedilol 12.5 MG tablet
  LORazepam 1 MG tablet
  nitroGLYCERIN 0.4 MG SL tablet
  
 
 Discharge took over 30  minutes, to include final examination, discussion of admission, and
 preparation of prescriptions, instructions for on-going care, follow-up and documentation o
f discharge summary.
 
 Silas Ferrara MD
 2019in this encounter
 
 Discharge Instructions
 Julia Riley, RN - 2019
 Stable Angina
 The chest discomfort you have appears to be coming from your heart  a condition called ang
micaela. Angina is a pain in the heart due to poor blood flow to the heart muscle. This can occu
r when plaque builds up on the artery wall or a blood clot forms in one or more of the small
 blood vessels that deliver oxygen to the heart muscle. Plaque is a fatty material made up o
f cholesterol, calcium deposits, and other materials.
 Exercise, increased activity, emotional upset, or stress can trigger this pain. With proper
 treatment and lifestyle changes to reduce risk factors, most people with angina are able to
 maintain a full and active life.
 Angina is not a heart attack. But if angina pain is severe or prolonged, it is a sign ofa
n impending heart attack, also called acute myocardial infarction, or AMI. Your angina is un
elizabeth control at this time. Therefore, it is safe for you to go home. Follow up as instructed 
by your doctor for any further tests and office visits.
 
 Home care
  Rest at home today and avoid any emotional or physically strenuous activity.
  Take medicine (usually nitroglycerin) for chest pain exactly as prescribed. Keep your ni
troglycerin with you at all times.
  When taking nitroglycerin for angina, sit or lie down. The medicine may make you feel di
zzy.
  Place one tablet under your tongue, or between your lip and gum, or between your cheek a
nd gum. Let the tablet dissolve completely; do not chew or swallow the tablet.
  If you use a spray, then spray once on orunder your tongue. Do not inhale. Right after
 you use the spray, close your mouth. Wait a few seconds before you swallow.
  After taking one tablet or spraying once, continue sitting or lying for 5 minutes.
  If the angina goes away completely, rest awhile and continue your normal routine.
 Note: Your healthcare provider may give you slightly different instructions than those claudine fernandez. If so, follow them carefully.
 Prevention
  Learn how to take your own blood pressure. Keep a record of your results. Ask your docto
r which readings mean that you need medical attention. Reduce salt intake and follow lifesty
le change instructions if you have high blood pressure (hypertension).
  Maintain a healthy weight. Get help to lose any extra pounds. Talk to your doctor about 
a safe diet program. Sudden large weight losses can be dangerous.
  If you have diabetes, talk to your doctor about healthy control of your blood sugar.
  Begin an exercise program. Ask your doctor how to get started. You can benefit from simp
le activities such as walking or gardening. Short, high intensity exercise sessions may also
 be beneficial.
  Break the smoking habit. Enroll in a stop-smoking program to improve your chances of suc
cess.
  Get adequate rest.
 
  Stay away from stressful situations. Learn stress-management techniques.
 Follow-up care
 Followup with your doctor, or as advised.
 If an X-ray wasdone , it will be reviewed by another specialist. You will be notified of 
any new findings that may affect your care.
 Call 911
 This is the fastest and safest way to get to the nearest emergency department. The paramedi
cs can also start treatment on the way to the hospital, saving valuable time for your heart.
  Ifangina gets worse, it continues, or if it stops and returns, call 911 right away. Do
n't delay. You may be having a heart attack.
  After you call 911, take a second nitroglycerine tablet or spray unless instructed other
wise. When repeating doses, sit down if possible because it can make you feel lightheaded or
 dizzy. Wait another 5 minutes. If the angina still does not go away, take a third tablet or
 spray. Don't take more than 3 tablets or sprays within 15 minutes. Stay on the phone with 9
11 for more instructions.
  Your healthcare provider may give you slightly different instructions than those above. 
If so, follow them carefully.
 Don'twait until your symptoms are severe to call 911. Other reasons to call 911 besides c
hest pain include:
  Trouble breathing
  Feeling lightheaded, faint, or dizzy
  Rapid heart beat
  Slower than usual heart rate compared to your normal
  Angina with weakness, dizziness, fainting, heavy sweating, nausea, or vomiting
  Extreme drowsiness, or confusion
  Weakness of an arm or leg or on one side of the face
  Difficulty with speech or vision
 When to seek medical advice
 Remember, the signs and symptoms of a heart attack are not always like they are on TV. Some
times they are not so obvious. You may only feel weak or just "not right." If it is not charly
r or if you have any doubt, call for advice.
  Seek help if there is a change in the type of pain, if it feels different, or if your sy
mptoms are mild.
  Don't drive yourself. Have someone else drive. If no one can drive you, call 911.
  If your doctor has given you medicine to take when you have symptoms, take them, but do 
not delay getting help.
  Don't delay. Fast diagnosis and treatment can prevent or limit the amount of heart dam
age during a heart attack or stroke.
  Don't go to your doctor's office or a clinic because they may not be able to provide all
 the testing and treatment you need.
 Date Last Reviewed: 2015-2018 [employer]. [address] [place] [address], [place] [address]. All righ
ts reserved. This information is not intended as a substitute for professional medical care.
 Always follow your healthcare professional's instructions.
 
 in this encounter
 
 Medications at Time of Discharge
 
 
+----------------------+----------------------+--------+---------+----------+-----------+
| Medication           | Sig.                 | Disp.  | Refills | Start    | End Date  |
|                      |                      |        |         | Date     |           |
+----------------------+----------------------+--------+---------+----------+-----------+
|   albuterol          | Inhale 2 puffs into  |        |         |          |           |
| (PROVENTIL           | the lungs every 4    |        |         |          |           |
| HFA;VENTOLIN HFA)    | (four) hours as      |        |         |          |           |
| 108 (90 Base)        | needed for Wheezing. |        |         |          |           |
| MCG/ACT              |                      |        |         |          |           |
| inhalerIndications:  |                      |        |         |          |           |
 
| states uses 2x       |                      |        |         |          |           |
| monthly average      |                      |        |         |          |           |
+----------------------+----------------------+--------+---------+----------+-----------+
|   apixaban (ELIQUIS) | Take 1 tablet by     |   60   | 0       | 20 |           |
|  2.5 MG tablet       | mouth 2 (two) times  | tablet |         | 18       |           |
|                      | daily.               |        |         |          |           |
+----------------------+----------------------+--------+---------+----------+-----------+
|   carvedilol (COREG) | Take 1 tablet by     |   60   | 0       | 20 |  |
|  12.5 MG tablet      | mouth 2 (two) times  | tablet |         | 19       | 0         |
|                      | daily with meals.    |        |         |          |           |
+----------------------+----------------------+--------+---------+----------+-----------+
|   esomeprazole       | Take 1 capsule by    |        |         |          |           |
| (NEXIUM) 40 MG       | mouth every day      |        |         |          |           |
| capsule              |                      |        |         |          |           |
+----------------------+----------------------+--------+---------+----------+-----------+
|                      | Take 1 tablet by     |   30   | 0       | 20 |           |
| HYDROcodone-acetamin | mouth every 4 (four) | tablet |         | 19       |           |
| ophen (NORCO) 5-325  |  hours as needed.    |        |         |          |           |
| MG per tablet        |                      |        |         |          |           |
+----------------------+----------------------+--------+---------+----------+-----------+
|   insulin aspart     | Inject  into the     |        |         |          |           |
| (NOVOLOG) 100        | skin 3 (three) times |        |         |          |           |
| UNIT/ML injection    |  daily before meals. |        |         |          |           |
|                      |  Sliding scale       |        |         |          |           |
+----------------------+----------------------+--------+---------+----------+-----------+
|   insulin degludec   | Inject 21 units      |        |         |          |           |
| (TRESIBA) 100        | every night          |        |         |          |           |
| UNIT/ML injection    |                      |        |         |          |           |
+----------------------+----------------------+--------+---------+----------+-----------+
|   isosorbide         | Take 1 tablet by     |   30   | 1       | 20 |  |
| mononitrate (IMDUR)  | mouth daily.         | tablet |         | 18       | 9         |
| 60 MG 24 hr tablet   |                      |        |         |          |           |
+----------------------+----------------------+--------+---------+----------+-----------+
|   nitroGLYCERIN      | Place 1 tablet under |   30   | 0       | 20 |  |
| (NITROSTAT) 0.4 MG   |  the tongue every 5  | tablet |         | 19       | 0         |
| SL tablet            | (five) minutes as    |        |         |          |           |
|                      | needed for Chest     |        |         |          |           |
|                      | pain.                |        |         |          |           |
+----------------------+----------------------+--------+---------+----------+-----------+
|   nortriptyline      | Take 25-75 mg by     |        |         |          |           |
| (PAMELOR) 25 MG      | mouth See Admin      |        |         |          |           |
| capsule              | Instructions. Takes  |        |         |          |           |
|                      | 25 mg by mouth every |        |         |          |           |
|                      |  morning and 75 mg   |        |         |          |           |
|                      | every night          |        |         |          |           |
+----------------------+----------------------+--------+---------+----------+-----------+
|   ondansetron        | Take 4 mg by mouth   |        |         | 20 |           |
| (ZOFRAN-ODT) 4 MG    | every 8 (eight)      |        |         | 18       |           |
| disintegrating       | hours as needed.     |        |         |          |           |
| tablet               |                      |        |         |          |           |
+----------------------+----------------------+--------+---------+----------+-----------+
|   oxybutynin         | Take 7.5 mg by mouth |        |         |          |           |
| (DITROPAN) 5 MG      |  2 (two) times       |        |         |          |           |
| tablet               | daily.               |        |         |          |           |
+----------------------+----------------------+--------+---------+----------+-----------+
|   rOPINIRole         | Take 0.75 mg by      |        |         |          |           |
| (REQUIP) 0.25 MG     | mouth nightly.       |        |         |          |           |
| tablet               |                      |        |         |          |           |
+----------------------+----------------------+--------+---------+----------+-----------+
|   albuterol          | Ventolin HFA 90      |        |         |          |  |
 
| (VENTOLIN HFA) 108   | mcg/actuation        |        |         |          | 9         |
| (90 Base) MCG/ACT    | aerosol inhaler      |        |         |          |           |
| inhaler              | Inhale 2 puffs every |        |         |          |           |
|                      |  4 hours by          |        |         |          |           |
|                      | inhalation route as  |        |         |          |           |
|                      | needed.              |        |         |          |           |
+----------------------+----------------------+--------+---------+----------+-----------+
|   aspirin 81 MG EC   | Take 1 tablet by     |   30   | 0       | 07/10/20 |  |
| tablet               | mouth daily with     | tablet |         | 17       | 9         |
|                      | breakfast.           |        |         |          |           |
+----------------------+----------------------+--------+---------+----------+-----------+
|   atorvastatin       | Take 1 tablet by     |   30   | 0       | 20 |  |
| (LIPITOR) 40 MG      | mouth nightly.       | tablet |         | 19       | 9         |
| tablet               |                      |        |         |          |           |
+----------------------+----------------------+--------+---------+----------+-----------+
|   calcium acetate    | 667 mg 3 (three)     |        |         | 20 |  |
| (PHOSLO) 667 MG      | times daily with     |        |         | 16       | 9         |
| capsule              | meals.               |        |         |          |           |
+----------------------+----------------------+--------+---------+----------+-----------+
|   Cholecalciferol    | Take 5,000 capsules  |        |         |          |  |
| (VITAMIN D3) 5000    | by mouth every other |        |         |          | 9         |
| UNITS CAPS           |  day.                |        |         |          |           |
+----------------------+----------------------+--------+---------+----------+-----------+
|   clopidogrel        | Take 1 tablet by     |   30   | 11      | 20 |  |
| (PLAVIX) 75 MG       | mouth daily.         | tablet |         | 18       | 9         |
| tablet               |                      |        |         |          |           |
+----------------------+----------------------+--------+---------+----------+-----------+
|   loperamide         | 2 mg as needed.      |        |         |          |  |
| (IMODIUM) 2 MG       |                      |        |         |          | 9         |
| capsule              |                      |        |         |          |           |
+----------------------+----------------------+--------+---------+----------+-----------+
|   LORazepam (ATIVAN) | Take 1 tablet by     |   30   | 0       | 20 | 02/15/201 |
|  1 MG tablet         | mouth every 8        | tablet |         | 19       | 9         |
|                      | (eight) hours as     |        |         |          |           |
|                      | needed for Anxiety.  |        |         |          |           |
+----------------------+----------------------+--------+---------+----------+-----------+
|   Magnesium          | Take 1 tablet by     |        |         |          |  |
| Cl-Calcium Carbonate | mouth every other    |        |         |          | 9         |
|  (SLOW-MAG) 71.5-119 | day.                 |        |         |          |           |
|  MG TBEC             |                      |        |         |          |           |
+----------------------+----------------------+--------+---------+----------+-----------+
|   prochlorperazine   | Take 5 mg by mouth 2 |        |         |          |  |
| (COMPAZINE) 5 MG     |  (two) times daily   |        |         |          | 9         |
| tabletIndications:   | as needed for        |        |         |          |           |
| Severe Nausea and    | Nausea. Indications: |        |         |          |           |
| Vomiting             |  Severe Nausea and   |        |         |          |           |
|                      | Vomiting             |        |         |          |           |
+----------------------+----------------------+--------+---------+----------+-----------+
|   ranitidine         | ranitidine 150 mg    |        |         |          |  |
| (ZANTAC) 150 MG      | tablet Take 1 tablet |        |         |          | 9         |
| tablet               |  twice a day by oral |        |         |          |           |
|                      |  route.              |        |         |          |           |
+----------------------+----------------------+--------+---------+----------+-----------+
|   Vortioxetine HBr   | 10 mg nightly.       |        |         |          |  |
| 10 MG TABS           |                      |        |         |          | 9         |
+----------------------+----------------------+--------+---------+----------+-----------+
 as of this encounter
 
 Progress Notes
 Ethan Awad MD - 2019 12:31 PM PSTFormatting of this note may be different from t
 
adrianna original.
 4460/4460-1  
    LOS: 0 days 
 She is followed for ESRD management.
 She was admitted with chest pain and concern with unstable angina/NSTEMI.
 I assumed nephrology care from Dr. Nguyen 19.
 She feels OK today and is happy at being discharged today.
 She had uncomplicated HD yesterday and has no chest pain today.
 She is ambulating with minimal weight bearing left foot.
 She has no chest pain.
 
 PMH, PSH, Social history reviewed in chart. Allergies and medications reviewed. Previous hi
story summarized as above. Prior lab data and imaging reviewed.Patient's old records and lab
s were reviewed in detail and summarized.
 
 ROS:
 Review of systems is as per history of present illness. Otherwise a 14 organ system review 
of systems was done and is negative.
 
 Examination:
 
 APPEARANCE: She is is alert, awake, sitting up in chair in no distress. 
 VITALS: Reviewed as listed.
 HEAD: NC/AT. 
 EYES: EOMI. Non-icteric sclera.
 NECK: No JVD. 
 LUNGS: Effort fair. CTA. 
 HEART: S1 soft, no pericardial rub noted. Systolic murmur at apex with radiation to back.
 ABDOMEN: Soft with minimal distention, no tenderness. No organomegaly or bruits noted. Benoit
l sounds diminished.
 EXTREMITIES: No LE edema. No cyanosis or clubbing. Peripheral pulses not palpable. Left jeanne
t s/p TMA and in a boot.
 SKIN: Warm to touch. No rash.
 NEUROLOGIC: Alert, awake, oriented, speech fluent. Gait not tested. 
 PSYCH: pleasant demeanor..
 BACK: No CVA tenderness noted. There is no sacral edema.
 
 Dialysis access
 Left brachiocephalic AV fistula with no evidence of malfunction or inflammation
 
 vital Signs:
 /56 (BP Location: Right upper arm)  | Pulse 68  | Temp 97.7 F (36.5 C) (Axillary)
  | Resp 20  | Ht 1.778 m (5' 10")  | Wt 65.5 kg (144 lb 6.4 oz)  | SpO2 92%  | BMI 20.72 kg
/m 
 
 Data evaluation:
 
 Lab Results 
 Component Value Date 
  BUN 15 2019 
  CREATININE 4.9 (H) 2019 
  EGFR 9 (L) 2019 
   2019 
  K 4.4 2019 
   2019 
  CO2 28 2019 
  CA 9.6 2019 
  PHOS 4.4 2019 
  MG 2.2 2018 
  ALB 2.6 (L) 2019 
 
  HGB 9.7 (L) 2019 
 
 Lab Results 
 Component Value Date 
  HGB 9.7 (L) 2019 
  HGB 8.5 (L) 2019 
  HGB 8.7 (L) 2019 
  FERRITIN 722 (H) 2017 
  FERRITIN 793 (H) 2016 
  FERRITIN 883 (H) 2016 
  LABIRON 28 2017 
  LABIRON 17 2016 
  LABIRON 31 2016 
 
 Lab Results 
 Component Value Date 
  ANIONGAP 14 2019 
 
 Last 3 Troponin:  
 Lab Results 
 Component Value Date 
  TROPONINI 0.061 2019 
  TROPONINI 0.075 2019 
  TROPONINI 0.256 (H) 2019 
 
  Lower Extremity Arterial Left 2018 showed Greater than 50% stenosis of the mid sup
erficial femoral artery. 2.  Monophasic single runoff to the ankle via the anterior tibial a
rtery.
 
 X-ray Foot Left 2018 showed amputation of the left forefoot at the level of the proxi
mal metatarsals. No radiographic evidence for osteomyelitis. Normal alignment of the hindfoo
t. Mild degeneration of the midfoot. 
 
 EKG 19 showed Normal sinus rhythm. Non-specific intra-ventricular conduction delay. No
nspecific ST abnormality. Poor R - progression 
 
 Assessment and Recommendations:
 
 1. ESRD on HD
 2. Anemia chronic renal failure
 3. Chronic systolic CHF s/p ICD
 4. Chest pain with concern for angina
 5. Anemia chronic renal failure
 6. Chronic anticoagulation
 7. PAD s/p left TMA
 8. Paroxysmal Afib
 9. CAD s/p CABG
 
 She is hemodynamically stable with no fever/tachycardia/bradycardia/hypotension or severe h
ypertension/hypoxia at rest.
 She is euvolemic.
 BP is OK and she remains in SR on EKG. She remains on BB/ARB.
 It doesn't look like she had an AMI.
 She remains on Clopidogrel/apixaban/atorvastatin/long acting nitrate. She has been seen by 
Dr. Porras whose evaluation is noted.
 Gastritis was a consideration. She remains on PPI.
 
  From my side she can be discharged and resume outpatient HD TTS.
  Continue follow up with cardiology for optimal medical management.
 
 
 She should be on a 2 gm Na, 2 gm K, 1 gm PO4, 1 gm/kg protein diet.
 Please dose all medications for an eGFR of less than 15 ml/min/1.73 m2.
 NSAIDS/HOPPER 2 inhibitors should be avoided. Aluminum/magnesium containing antacids and magne
sium/phosphate containing enemas should be avoided.
 Fluid should be restricted to less than 1.5 L in a 24 hr period.
 Strict I/O should be done.
 Plan of care was discussed with Dr. Ferrara.
 
 ETHAN AWAD MD
 2019
 
 Portions of my previous notes have been carried over for continuity of care.
 She was seen earlier in the day and charting was completed later after rounds.
 Dictation software, Dragon, was used which may contain error for similar sounding words. Pe
rsonal communication is requested for any clarification.
 Prognosis is guarded in view of multiple comorbid illnesses and ESRD including but not limi
ashly to death.
 
   apixaban  2.5 mg Oral BID 
   atorvastatin  40 mg Oral Nightly 
   calcium acetate  667 mg Oral TID WC 
   calcium carbonate  1,000 mg Oral Q48H 
  And 
   magnesium chloride  1 tablet Oral Q48H 
   carvedilol  12.5 mg Oral BID WC 
   cholecalciferol  1,000 Units Oral Daily 
   clopidogrel  75 mg Oral Daily 
   insulin detemir  10 Units Subcutaneous Nightly 
   insulin lispro (human)  0-3 Units Subcutaneous Nightly 
   insulin lispro (human)  0-6 Units Subcutaneous TID AC 
   isosorbide mononitrate  60 mg Oral Daily 
   losartan  25 mg Oral Daily 
   nortriptyline  25 mg Oral QAM 
   nortriptyline  75 mg Oral Nightly 
   oxybutynin  2.5 mg Oral BID 
   pantoprazole  40 mg Oral QAM AC 
   rOPINIRole  0.75 mg Oral Nightly 
 
   dextrose   
 
 Celestino Porras MD - 2019  7:14 AM PSTFormatting of this note may be different fro
m the original.
 [facility]
 Service:  Cardiology
 Progress Note
 
 Name of Consultant: Celestino Porras MD
 I have seen the patient on 2019 
 SUBJECTIVE
 
 Current Facility-Administered Medications: 
    acetaminophen (TYLENOL) tablet 650 mg, 650 mg, Oral, Q6H PRN **OR** acetaminophen (TYL
ENOL) suppository 650 mg, 650 mg, Rectal, Q6H PRN, Negro Lr DO
    albuterol (PROVENTIL HFA;VENTOLIN HFA) inhaler, 2 puff, Inhalation, Q4H PRN, Negro alexander DO
    apixaban (ELIQUIS) tablet 2.5 mg, 2.5 mg, Oral, BID, Negro Lr DO, 2.5 mg at  2153
    atorvastatin (LIPITOR) tablet 40 mg, 40 mg, Oral, Nightly, Negro Lr DO, 40 mg at 
19 2153
    calcium acetate (PHOSLO) capsule 667 mg, 667 mg, Oral, TID WC, Negro Lr DO, 667 m
 
g at 19 1618
    calcium carbonate (TUMS) chewable tablet 1,000 mg, 1,000 mg, Oral, Q48H, 1,000 mg at 0
19 1147 **AND** magnesium chloride (MAG64) EC tablet 64 mg, 1 tablet, Oral, Q48H, Silas Ferrara MD, 64 mg at 19 1147
    carvedilol (COREG) tablet 12.5 mg, 12.5 mg, Oral, BID , Celestino Porras MD, 12.5 m
g at 19 1618
    cholecalciferol (VITAMIN D-3) tablet 1,000 Units, 1,000 Units, Oral, Daily, Negro drummond DO, 1,000 Units at 19 1148
    clopidogrel (PLAVIX) tablet 75 mg, 75 mg, Oral, Daily, Negro Lr DO, 75 mg at  1147
    dextrose 10 % infusion, , Intravenous, Continuous PRN, Negro Lr DO
    dextrose 50 % solution 12 mL, 12 mL, Intravenous, PRN, Negro Lr DO
    dextrose 50 % solution 25 mL, 25 mL, Intravenous, PRN, Negro Lr DO
    glucagon (GLUCAGEN) injection 0.5 mg, 0.5 mg, Intramuscular, PRN, Negro Lr DO
    glucagon (GLUCAGEN) injection 1 mg, 1 mg, Intramuscular, PRN, Negro Lr DO
    HYDROcodone-acetaminophen (NORCO) 5-325 MG per tablet 1 tablet, 1 tablet, Oral, Q4H OR
N, Negro Lr DO, 1 tablet at 19 0542
    insulin detemir (LEVEMIR) injection 10 Units, 10 Units, Subcutaneous, Nightly, Negro Lr DO, 10 Units at 19 2154
    insulin lispro (human) (HUMALOG) injection 0-3 Units, 0-3 Units, Subcutaneous, Nightly
, Negro Lr DO, 1 Units at 19 2243
    insulin lispro (human) (HUMALOG) injection 0-6 Units, 0-6 Units, Subcutaneous, TID AC,
 Negro Lr DO, 1 Units at 19 0538
    isosorbide mononitrate (IMDUR) 24 hr tablet 60 mg, 60 mg, Oral, Daily, STEVEN Malcolm
O, 60 mg at 19 0734
    loperamide (IMODIUM) capsule 2 mg, 2 mg, Oral, PRN, Negro Lr DO
    LORazepam (ATIVAN) tablet 1 mg, 1 mg, Oral, Q6H PRN, Silas Ferrara MD, 1 mg at 19 
1634
    losartan (COZAAR) tablet 25 mg, 25 mg, Oral, Daily, Negro Lr DO, 25 mg at 
9 1148
    nitroGLYCERIN (NITROSTAT) SL tablet 0.4 mg, 0.4 mg, Sublingual, Q5 Min PRN, Silas Ferrara MD, 0.4 mg at 19 0539
    nortriptyline (PAMELOR) capsule 25 mg, 25 mg, Oral, QAM, Silas Ferrara MD
    nortriptyline (PAMELOR) capsule 75 mg, 75 mg, Oral, Nightly, Silas Ferrara MD, 75 mg at 
19 2153
    ondansetron (ZOFRAN-ODT) disintegrating tablet 4 mg, 4 mg, Oral, Q6H PRN **OR** ondans
etron (ZOFRAN) injection 4 mg, 4 mg, Intravenous, Q6H PRN, Negro Lr DO
    oxybutynin (DITROPAN) tablet 2.5 mg, 2.5 mg, Oral, BID, Negro Lr DO, 2.5 mg at 2154
    pantoprazole (PROTONIX) EC tablet 40 mg, 40 mg, Oral, QAM AC, Negro Lr DO, 40 mg 
at 19 0542
    polyethylene glycol (GLYCOLAX) packet 17 g, 17 g, Oral, Daily PRN, Negro Lr DO
    prochlorperazine tablet 5 mg, 5 mg, Oral, BID PRN, Negro Lr DO
    rOPINIRole (REQUIP) tablet 0.75 mg, 0.75 mg, Oral, Nightly, Negro Lr DO, 0.75 mg 
at 19
 
 OBJECTIVE
 Vital Signs:
 /60 (BP Location: Right upper arm)  | Pulse 77  | Temp 98.3 F (36.8 C) (Oral)  | 
Resp 18  | Ht 1.778 m (5' 10")  | Wt 65.5 kg (144 lb 6.4 oz)  | SpO2 98%  | BMI 20.72 kg/m
 
 
 Intake/Output Summary (Last 24 hours) at 19 0714
 Last data filed at 19 2252
  Gross per 24 hour 
 Intake             2473 ml 
 Output             3500 ml 
 Net            -1027 ml 
 
 Cardiovascular: Regular rhythm, S1 normal and S2 normal.  
 
 Systolic murmur in the left sternal border. 
 Pulmonary/Chest: Effort normal and breath sounds normal. No wheezes. No rales. 
 Abdominal: Soft. No tenderness. 
 Musculoskeletal: No edema. 
 Neurological: Alert. No cranial nerve deficit. 
 Skin: Warm and dry. 
 
 DATA
 
 Recent Labs
 Lab 19
 0554 19
 0847 
  139 
 K 4.4 4.4 
 CO2 28 32 
 BUN 15 24 
 CREATININE 4.9* 6.5* 
 EGFR 9* 6* 
 
 Recent Labs
 Lab 19
 0554 19
 0847 
 WBC 4.08 4.23 
 HGB 9.7* 8.5* 
 HCT 29.7* 26.0* 
 .0* 103.8* 
 * 134* 
 
 Recent Labs
 Lab 19
 1747 19
 1411 
 TROPONINI 0.061 0.075 
 
 Lab Results 
 Component Value Date 
  CHOL 101 2017 
  TRIG 132 2017 
  LDL 41 2017 
  HDL 34 (L) 2017 
  GLUF 153 (H) 2019 
  HGBA1C 7.5 (H) 2018 
  TSH 1.14 2017 
 
 EK2019
 
 Last Echo: 2018
 Reported with normal LV size, EF 20-25%. Mild AI. Mild AS. Mild MR. Moderate TR. Moderate p
ulmonary HTN RVSP 55.9.
 Last stress test: 2018
 Reported with no evidence of ischemia. 
 Last cath: 2018 shows three-vessel coronary artery disease with widely patent stent in
 the left anterior descending artery and severe stent restenosis in the marginal branch, occ
luded right coronary artery with collateral from left to right.
 Carotid US: 
 AAA screening: 
 Lower extremity US: IR angioplasty 2018
 Angioplasty of the left common iliac artery was then performed using 6 x 40 mm angioplasty 
 
balloon.
 Drug eluting balloon angioplasty of the left SFA was then performed using 4 x 100 mm Lutoni
x balloon.
 OTHERS: on 2018
 STATUS POST Mitral valve repair with a 28 Cristopher annuloplasty band, CABG x 2 with grafts 
to OM and RCA; endoscopic vein harvest (left greater saphenous vein) 
 2018
 S/P insertion of [employer] ICD.
 
 ASSESSMENT & PLAN
 Patient is 68 y.o. with
 1. Coronary artery disease with previous CABG X 2.
 2. Severely impaired systolic function.
 3. Ischemic and non ischemic cardiomyopathy, NYH class III, stage C.
 4. End stage renal disease on HD.
 5. Peripheral vascular disease.
 6. Osteomyelitis.
 7. Anemia due to chronic disease. 
 8. S/P mitral valve repair.
 9. Essential hypertension. 
 10. Insulin dependent diabetes mellitus.
 11. Post Op atrial fibrillation. 
 12. Hyperlipidemia. 
 
 Recommendations:
 Complex past medical history and presentation.
 Patient is chest pain free at this time, no chest pain after isosorbide. 
 Continue with carvedilol 12.5 mg bid..
 Continue with isosorbide mononitrate 60 mg. 
 Needs optimization of Cardiomyopathy treatment. 
 Consider increasing losartan if hemodynamics allow. 
 Furosemide was stopped. 
 Continue with Apixaban and Clopidogrel.
 Aspirin was stopped, anemia and high risk patient on triple therapy.
 Further recommendations will follow. 
 
 Code Status:  Full Code
 
 Celestino Porras MD
 2019 Silas Ferrara MD - 2019  8:15 AM PSTFormatting of this note may be different
 from the original.
 [facility]  
 Service:  Hospitalist
 Progress Note
 
 Hospital Day:   LOS: 0 days 
 Post-Op Day:  * No surgery found *
 SUBJECTIVE
 
      Patient Summary: refer to H&P and consult note for details 
 
      Events Overnight:  Patient seen and examined,denies CP or SOB or abdominal pain,stable
 VS,addressed all questions, HD at bed side, negative troponin.patient would rather be home 
not SNF. 
 
 Scheduled Medications
  
   apixaban  2.5 mg Oral BID 
   atorvastatin  40 mg Oral Nightly 
   calcium acetate  667 mg Oral TID WC 
 
   calcium carbonate  1,000 mg Oral Q48H 
  And 
   magnesium chloride  1 tablet Oral Q48H 
   carvedilol  12.5 mg Oral BID WC 
   cefTAZidime  2 g Intravenous After Hemodialysis (see admin instructions) 
   cholecalciferol  1,000 Units Oral Daily 
   clopidogrel  75 mg Oral Daily 
   insulin detemir  10 Units Subcutaneous Nightly 
   insulin lispro (human)  0-3 Units Subcutaneous Nightly 
   insulin lispro (human)  0-6 Units Subcutaneous TID AC 
   isosorbide mononitrate  60 mg Oral Daily 
   losartan  25 mg Oral Daily 
   nortriptyline  25-75 mg Oral See Admin Instructions 
   oxybutynin  2.5 mg Oral BID 
   pantoprazole  40 mg Oral QAM AC 
   rOPINIRole  0.75 mg Oral Nightly 
   vancomycin  500 mg Intravenous After Hemodialysis (see admin instructions) 
   Vortioxetine HBr  10 mg Oral Nightly 
 
 Continuous Infusions 
   dextrose   
 
 PRN Medications
 acetaminophen **OR** acetaminophen, albuterol, dextrose, dextrose, dextrose, glucagon, gluc
agon, HYDROcodone-acetaminophen, loperamide, LORazepam, nitroGLYCERIN, ondansetron **OR** on
dansetron, polyethylene glycol, prochlorperazine
 
 OBJECTIVE
 Vital Signs:
 /66  | Pulse 100  | Temp 98.3 F (36.8 C)  | Resp 18  | Ht 1.778 m (5' 10")  | Wt 
65.3 kg (143 lb 15.4 oz)  | SpO2 98%  | BMI 20.66 kg/m 
 
 Intake/Output Summary (Last 24 hours) at 19 1245
 Last data filed at 19 1207
  Gross per 24 hour 
 Intake             2000 ml 
 Output             3550 ml 
 Net            -1550 ml 
 
 General appearance: alert, appears stated age, cooperative, fatigued and no distress
 Head: Normocephalic, without obvious abnormality, atraumatic
 Neck: no adenopathy, no carotid bruit, no JVD, supple, symmetrical, trachea midline and thy
roid not enlarged, symmetric, no tenderness/mass/nodules
 Lungs: clear to auscultation bilaterally
 Heart: regular rate and rhythm, S1, S2 normal, no murmur, click, rub or gallop
 Abdomen: soft, non-tender; bowel sounds normal; no masses,  no organomegaly
 Extremities: extremities normal, atraumatic, no cyanosis or edema
 Pulses: 2+ and symmetric
 Neurologic: Grossly normal AXO3 non focal 
 
 DATA
 
 CBC:  
 Lab Results 
 Component Value Date 
  WBC 4.23 2019 
  RBC 2.50 (L) 2019 
  HGB 8.5 (L) 2019 
  HCT 26.0 (L) 2019 
  .8 (H) 2019 
 
  MCH 33.9 2019 
  MCHC 32.6 2019 
  RDW 66.1 (H) 2019 
   (L) 2019 
  MPV 8.7 2019 
  DIFFTYPE AUTOMATED 2019 
 
 CMP:  
 Lab Results 
 Component Value Date 
   2019 
  K 4.4 2019 
  CL 99 2019 
  CO2 32 2019 
  ANIONGAP 12 2019 
  GLUF 197 (H) 2019 
  BUN 24 2019 
  CREATININE 6.5 (H) 2019 
  BCR 4 2019 
  CA 8.7 2019 
  CA 8.7 2016 
  PROT 6.3 2019 
  ALB 2.2 (L) 2019 
  GLOB 3.7 2019 
  BILITOT 0.4 2019 
  ALP 85 2019 
  AST 23 2019 
  ALT 17 2019 
  EGFR 6 (L) 2019 
 
 Lab Results 
 Component Value Date 
  CKTOTAL 22 (L) 2019 
  CKMB 1.8 2019 
  CKMBINDEX 8.2 2019 
  TROPONINI 0.061 2019 
 
 Lab Results 
 Component Value Date 
  PHOS 4.4 2019 
 
 I have reviewed the above labs and radiology reports.
 
 PROBLEM LIST
 
 Principal Problem:
   Chest pain
 Active Problems:
   Coronary artery disease involving native coronary artery of native heart without angina p
ectoris
   ESRD (end stage renal disease) ([facility])
   Type 2 diabetes mellitus with chronic kidney disease on chronic dialysis, with long-term 
current use of insulin ([facility])
   Anemia in ESRD (end-stage renal disease) ([facility])
   Hypertension, essential
   Pleural effusion
   Chronic systolic congestive heart failure (HCC)
   Chronic diarrhea
   Chronic anticoagulation
   Cardiomyopathy ([facility])
 
   COPD (chronic obstructive pulmonary disease) ([facility])
   ICD (implantable cardioverter-defibrillator) in place
   Paroxysmal atrial fibrillation ([facility])
   Chronic multifocal osteomyelitis of left foot ([facility])
   PVD (peripheral vascular disease) ([facility])
 
 Patient diagnosed with:  , and I agree with the following nutritional recommendations:
  
 
 ASSESSMENT & PLAN
 
 Chest pain with known CAD, hx CABG, ischemic cardiomyopathy EF 20%, chronic systolic CHF, H
TN continue current meds also I have d/w and consulted  he [facility] medical managemen
t for now 
 
 P-AFRate controlled continue BB and Eliquis.
 
 L TMA on 18 NWB LLE per , continue abx per Dr Elliott.d/w pathology bone sherri
n is clean of infection 
 
 ESRD on HD per nephrology 
 
 Anemia due to ESRD defer to nephrology 
 
 DM2 continue current insulin regimen and monitor BG adjust as needed
 
 COPD Stable, not exacerbated O2 as needed and nebs 
 
 Anxiety increase ativan to Q6 prn 
 
 GI px PPI
 
 
 PT/OT eval and tx and CW for discharge planning
 
 Review of H/P, imaging and labs, formulation of assessment and plan, discussion with the pa
tient/family, staff, and providers. 
 
 Portions of this chart may have been copied from previous notes for continuity of care purp
oses.
 
 Disposition: admitted 
 Code Status:  Full Code
 
 Silas Ferrara MD
 2019MoBobo quinonez - 2019  7:42 PM PSTPt requested visit. Pt sitting up in bed. Pt 
gave brief hx of inadequate care at previous care facility which led to being at [facility]. Chp
 provided empathetic, supportive listening, entering into pt pain & struggle. Pt talked abou
t a recent "bad dream" she'd had where a threatening presence was in her room (at previous f
acility), & pt instinctive response of calling out to God for help. Pt wondered aloud if urszula
t & other problems meant she wasn't still a Bahai. [facility] pointed out that pt deep, initial
 response was to call out for God, which shows the depth of her ravinder & connection w/God. Pt
 seemed relieved at this observation. Pt not able to get out to [employer], but does have a "TV 
preacher" that gives her the Word & encourages her. [facility] celebrated pt having resources that 
build her up & refresh her - encouraging her to continue doing what works for her. [facility] offer
ed to pray, pt accepted. p prayed for strength, healing, continuing closeness w/sustaining
 God. Pt thanked [facility] for visit & prayer.
 
 Chaplain Bobo Berrios this encounter
 
 
 Plan of Treatment
 
 
+--------+---------+-----------+----------------------+-------------+
| Date   | Type    | Specialty | Care Team            | Description |
+--------+---------+-----------+----------------------+-------------+
| 04/10/ | Office  | Podiatry  |   Srinivasan Jordan,  |             |
| 2019   | Visit   |           | DPM  [address], |             |
|        |         |           |  [address]  [place],  |             |
|        |         |           | [place] [address]             |             |
|        |         |           | [phone]         |             |
|        |         |           | [phone] (Fax)   |             |
+--------+---------+-----------+----------------------+-------------+
 as of this encounter
 
 Procedures
 
 
+----------------------+--------+-------------+----------------------+----------------------
+
| Procedure Name       | Priori | Date/Time   | Associated Diagnosis | Comments             
|
|                      | ty     |             |                      |                      
|
+----------------------+--------+-------------+----------------------+----------------------
+
| POCT GLUCOSE         | Routin | 2019  |                      |   Results for this   
|
|                      | e      | 11:45 AM    |                      | procedure are in the 
|
|                      |        | PST         |                      |  results section.    
|
+----------------------+--------+-------------+----------------------+----------------------
+
| SEDIMENTATION RATE,  | Routin | 2019  |                      |   Results for this   
|
| AUTOMATED            | e - AM |  5:54 AM    |                      | procedure are in the 
|
|                      |        | PST         |                      |  results section.    
|
+----------------------+--------+-------------+----------------------+----------------------
+
| CBC W/MANUAL DIFF    | Routin | 2019  |                      |   Results for this   
|
|                      | e      |  5:54 AM    |                      | procedure are in the 
|
|                      |        | PST         |                      |  results section.    
|
+----------------------+--------+-------------+----------------------+----------------------
+
| C-REACTIVE PROTEIN   | Routin | 2019  |                      |   Results for this   
|
|                      | e - AM |  5:54 AM    |                      | procedure are in the 
|
|                      |        | PST         |                      |  results section.    
|
+----------------------+--------+-------------+----------------------+----------------------
+
| COMPREHENSIVE        | Routin | 2019  |                      |   Results for this   
|
 
| METABOLIC PANEL      | e - AM |  5:54 AM    |                      | procedure are in the 
|
|                      |        | PST         |                      |  results section.    
|
+----------------------+--------+-------------+----------------------+----------------------
+
| POCT GLUCOSE         | Routin | 2019  |                      |   Results for this   
|
|                      | e      |  5:22 AM    |                      | procedure are in the 
|
|                      |        | PST         |                      |  results section.    
|
+----------------------+--------+-------------+----------------------+----------------------
+
| POCT GLUCOSE         | Routin | 2019  |                      |   Results for this   
|
|                      | e      |  9:02 PM    |                      | procedure are in the 
|
|                      |        | PST         |                      |  results section.    
|
+----------------------+--------+-------------+----------------------+----------------------
+
| POCT GLUCOSE         | Routin | 2019  |                      |   Results for this   
|
|                      | e      |  4:16 PM    |                      | procedure are in the 
|
|                      |        | PST         |                      |  results section.    
|
+----------------------+--------+-------------+----------------------+----------------------
+
| POCT GLUCOSE         | Routin | 2019  |                      |   Results for this   
|
|                      | e      | 12:17 PM    |                      | procedure are in the 
|
|                      |        | PST         |                      |  results section.    
|
+----------------------+--------+-------------+----------------------+----------------------
+
| CBC W/AUTO DIFF      | STAT   | 2019  |                      |   Results for this   
|
| (REFLEX TO MANUAL)   |        |  8:47 AM    |                      | procedure are in the 
|
|                      |        | PST         |                      |  results section.    
|
+----------------------+--------+-------------+----------------------+----------------------
+
| RENAL FUNCTION PANEL | STAT   | 2019  |                      |   Results for this   
|
|                      |        |  8:47 AM    |                      | procedure are in the 
|
|                      |        | PST         |                      |  results section.    
|
+----------------------+--------+-------------+----------------------+----------------------
+
| EKG STANDARD 12 LEAD | Routin | 2019  |                      |   Results for this   
|
|                      | e      |  5:50 AM    |                      | procedure are in the 
|
|                      |        | PST         |                      |  results section.    
|
 
+----------------------+--------+-------------+----------------------+----------------------
+
| POCT GLUCOSE         | Routin | 2019  |                      |   Results for this   
|
|                      | e      |  5:18 AM    |                      | procedure are in the 
|
|                      |        | PST         |                      |  results section.    
|
+----------------------+--------+-------------+----------------------+----------------------
+
| POCT GLUCOSE         | Routin | 2019  |                      |   Results for this   
|
|                      | e      | 10:42 PM    |                      | procedure are in the 
|
|                      |        | PST         |                      |  results section.    
|
+----------------------+--------+-------------+----------------------+----------------------
+
| POCT GLUCOSE         | Routin | 2019  |                      |   Results for this   
|
|                      | e      |  9:16 PM    |                      | procedure are in the 
|
|                      |        | PST         |                      |  results section.    
|
+----------------------+--------+-------------+----------------------+----------------------
+
| TROPONIN I           | Timed  | 2019  |                      |   Results for this   
|
|                      |        |  5:47 PM    |                      | procedure are in the 
|
|                      |        | PST         |                      |  results section.    
|
+----------------------+--------+-------------+----------------------+----------------------
+
| POCT GLUCOSE         | Routin | 2019  |                      |   Results for this   
|
|                      | e      |  4:56 PM    |                      | procedure are in the 
|
|                      |        | PST         |                      |  results section.    
|
+----------------------+--------+-------------+----------------------+----------------------
+
| EKG STANDARD 12 LEAD | STAT   | 2019  |                      |   Results for this   
|
|                      |        |  3:01 PM    |                      | procedure are in the 
|
|                      |        | PST         |                      |  results section.    
|
+----------------------+--------+-------------+----------------------+----------------------
+
| TROPONIN I           | Timed  | 2019  |                      |   Results for this   
|
|                      |        |  2:11 PM    |                      | procedure are in the 
|
|                      |        | PST         |                      |  results section.    
|
+----------------------+--------+-------------+----------------------+----------------------
+
| ED ISTAT TROPONIN    | STAT   | 2019  |                      |   Results for this   
|
 
|                      |        | 10:26 AM    |                      | procedure are in the 
|
|                      |        | PST         |                      |  results section.    
|
+----------------------+--------+-------------+----------------------+----------------------
+
| POC CARDIAC TROPONIN | Routin | 2019  |                      |   Results for this   
|
|                      | e      |  9:54 AM    |                      | procedure are in the 
|
|                      |        | PST         |                      |  results section.    
|
+----------------------+--------+-------------+----------------------+----------------------
+
| ED INFORMATION       | Routin | 2019  |                      |   Results for this   
|
| EXCHANGE             | e      |  9:47 AM    |                      | procedure are in the 
|
|                      |        | PST         |                      |  results section.    
|
+----------------------+--------+-------------+----------------------+----------------------
+
| EKG 12 LEAD UNIT     | Routin | 2019  |                      |   Results for this   
|
| PERFORMED            | e      |  9:44 AM    |                      | procedure are in the 
|
|                      |        | PST         |                      |  results section.    
|
+----------------------+--------+-------------+----------------------+----------------------
+
 in this encounter
 
 Results
 POCT glucose (2019 11:45 AM)
 
+--------------------+--------------------------+---------------+-----------------+
| Component          | Value                    | Ref Range     | Performed At    |
+--------------------+--------------------------+---------------+-----------------+
| GLUCOSE,POC SCREEN | 225 (H)Comment: Testing  | 65 - 99 mg/dL | [facility] |
|                    | [place] [facility] [place]     |               |                 |
|                    | [address];[place]   |               |                 |
|                    | [id number]                    |               |                 |
+--------------------+--------------------------+---------------+-----------------+
 
 
 
+-------------------+------------------+--------------------+--------------+
| Performing        | Address          | City/State/Zipcode | Phone Number |
| Organization      |                  |                    |              |
+-------------------+------------------+--------------------+--------------+
|   [facility] |   [address] | [place] [address] |              |
+-------------------+------------------+--------------------+--------------+
 CBC w/manual diff (2019  5:54 AM)
 
+----------------------+--------------------------------------------------------------------
-----+-------------------+--------------+
| Component            | Value                                                              
     | Ref Range         | Performed At |
+----------------------+--------------------------------------------------------------------
-----+-------------------+--------------+
 
| WBC                  | 4.08                                                               
     | 3.80 - 11.00 K/uL | [facility]   |
|                      |                                                                    
     |                   | LABORATORY   |
+----------------------+--------------------------------------------------------------------
-----+-------------------+--------------+
| RBC                  | 2.83 (L)                                                           
     | 3.70 - 5.10 M/uL  | [facility]   |
|                      |                                                                    
     |                   | LABORATORY   |
+----------------------+--------------------------------------------------------------------
-----+-------------------+--------------+
| HGB                  | 9.7 (L)                                                            
     | 11.3 - 15.5 g/dL  | [facility]   |
|                      |                                                                    
     |                   | LABORATORY   |
+----------------------+--------------------------------------------------------------------
-----+-------------------+--------------+
| HCT                  | 29.7 (L)                                                           
     | 34.0 - 46.0 %     | [facility]   |
|                      |                                                                    
     |                   | LABORATORY   |
+----------------------+--------------------------------------------------------------------
-----+-------------------+--------------+
| MCV                  | 105.0 (H)                                                          
     | 80.0 - 100.0 fl   | [facility]   |
|                      |                                                                    
     |                   | LABORATORY   |
+----------------------+--------------------------------------------------------------------
-----+-------------------+--------------+
| MCH                  | 34.2 (H)                                                           
     | 27.0 - 34.0 pg    | [facility]   |
|                      |                                                                    
     |                   | LABORATORY   |
+----------------------+--------------------------------------------------------------------
-----+-------------------+--------------+
| MCHC                 | 32.5                                                               
     | 32.0 - 35.5 g/dL  | [facility]   |
|                      |                                                                    
     |                   | LABORATORY   |
+----------------------+--------------------------------------------------------------------
-----+-------------------+--------------+
| RDW SD               | 64.3 (H)                                                           
     | 37 - 53 fl        | [facility]   |
|                      |                                                                    
     |                   | LABORATORY   |
+----------------------+--------------------------------------------------------------------
-----+-------------------+--------------+
| PLT                  | 125 (L)                                                            
     | 150 - 400 K/uL    | [facility]   |
|                      |                                                                    
     |                   | LABORATORY   |
+----------------------+--------------------------------------------------------------------
-----+-------------------+--------------+
| MPV                  | 9.5                                                                
     | fl                | [facility]   |
|                      |                                                                    
     |                   | LABORATORY   |
+----------------------+--------------------------------------------------------------------
-----+-------------------+--------------+
 
| DIFF TYPE            | MANUAL                                                             
     |                   | [facility]   |
|                      |                                                                    
     |                   | LABORATORY   |
+----------------------+--------------------------------------------------------------------
-----+-------------------+--------------+
| Neutrophils Manual   | 77                                                                 
     | %                 | [facility]   |
|                      |                                                                    
     |                   | LABORATORY   |
+----------------------+--------------------------------------------------------------------
-----+-------------------+--------------+
| Lymphocytes Manual   | 15                                                                 
     | %                 | [facility]   |
|                      |                                                                    
     |                   | LABORATORY   |
+----------------------+--------------------------------------------------------------------
-----+-------------------+--------------+
| Monocytes Manual     | 8                                                                  
     | %                 | [facility]   |
|                      |                                                                    
     |                   | LABORATORY   |
+----------------------+--------------------------------------------------------------------
-----+-------------------+--------------+
| Neutrophils Absolute | 3.14                                                               
     | 1.90 - 7.40 K/uL  | [facility]   |
|                      |                                                                    
     |                   | LABORATORY   |
+----------------------+--------------------------------------------------------------------
-----+-------------------+--------------+
| Lymphocytes Absolute | 0.61 (L)                                                           
     | 1.00 - 3.90 K/uL  | [facility]   |
|                      |                                                                    
     |                   | LABORATORY   |
+----------------------+--------------------------------------------------------------------
-----+-------------------+--------------+
| Monocytes Absolute   | 0.33                                                               
     | 0.00 - 0.80 K/uL  | [facility]   |
|                      |                                                                    
     |                   | LABORATORY   |
+----------------------+--------------------------------------------------------------------
-----+-------------------+--------------+
| MORPHOLOGY           | 1+Comment:                                                         
     |                   | [facility]   |
|                      | ANISO1+HYPONORMAL PLT                                              
     |                   | LABORATORY   |
|                      | MORPHTesting performed                                             
     |                   |              |
|                      | at [facility], [address]                                                     
     |                   |              |
|                      | [facility],                                                   
     |                   |              |
|                      | [place]    [address]                                             
     |                   |              |
|                      |Testing performed at [facility], [address], [place]    [phone] |                   |              |
|                      |                                                                    
     |                   |              |
+----------------------+--------------------------------------------------------------------
-----+-------------------+--------------+
 
 
 
 
+---------------+-------------------------+---------------------+----------------+
| Performing    | Address                 | City/State/Zipcode  | Phone Number   |
| Organization  |                         |                     |                |
+---------------+-------------------------+---------------------+----------------+
|   [facility]  |   [address]  | [place] [address] |   [phone] |
| LABORATORY    | [place]                   |                     |                |
+---------------+-------------------------+---------------------+----------------+
 Sedimentation rate, automated (2019  5:54 AM)
 
+-----------+--------------------------+--------------+--------------+
| Component | Value                    | Ref Range    | Performed At |
+-----------+--------------------------+--------------+--------------+
| ESR       | 24Comment: Testing       | 0 - 30 mm/Hr | [facility]   |
|           | performed at [facility], [address] |              | LABORATORY   |
|           |  [address],        |              |              |
|           | [place]  [id number]     |              |              |
+-----------+--------------------------+--------------+--------------+
 
 
 
+----------+
| Specimen |
+----------+
| Blood    |
+----------+
 
 
 
+---------------+-------------------------+---------------------+----------------+
| Performing    | Address                 | City/State/Zipcode  | Phone Number   |
| Organization  |                         |                     |                |
+---------------+-------------------------+---------------------+----------------+
|   [facility]  |   [address] [place] [address]  | [place] [address] |   [phone] |
| LABORATORY    | [address]                   |                     |                |
+---------------+-------------------------+---------------------+----------------+
 C-reactive protein (2019  5:54 AM)
 
+-----------+--------------------------+------------+--------------+
| Component | Value                    | Ref Range  | Performed At |
+-----------+--------------------------+------------+--------------+
| CRP       | <0.3Comment: Testing     | <0.5 mg/dL | [facility]   |
|           | performed at [facility], [address] |            | LABORATORY   |
|           |  [facility],        |            |              |
|           | [place]  [address]     |            |              |
+-----------+--------------------------+------------+--------------+
 
 
 
+----------+
| Specimen |
+----------+
| Blood    |
+----------+
 
 
 
+---------------+-------------------------+---------------------+----------------+
 
| Performing    | Address                 | City/State/Zipcode  | Phone Number   |
| Organization  |                         |                     |                |
+---------------+-------------------------+---------------------+----------------+
|   [facility]  |   [address]  | [place] [address] |   [phone] |
| LABORATORY    | [address]                   |                     |                |
+---------------+-------------------------+---------------------+----------------+
 Comprehensive metabolic panel (2019  5:54 AM)
 
+----------------+--------------------------+-------------------+--------------+
| Component      | Value                    | Ref Range         | Performed At |
+----------------+--------------------------+-------------------+--------------+
| SODIUM         | 138                      | 135 - 145 mmol/L  | [facility]   |
|                |                          |                   | LABORATORY   |
+----------------+--------------------------+-------------------+--------------+
| POTASSIUM      | 4.4                      | 3.5 - 4.9 mmol/L  | [facility]   |
|                |                          |                   | LABORATORY   |
+----------------+--------------------------+-------------------+--------------+
| CHLORIDE       | 100                      | 99 - 109 mmol/L   | [facility]   |
|                |                          |                   | LABORATORY   |
+----------------+--------------------------+-------------------+--------------+
| CO2            | 28                       | 23 - 32 mmol/L    | [facility]   |
|                |                          |                   | LABORATORY   |
+----------------+--------------------------+-------------------+--------------+
| ANION GAP AGAP | 14                       | 5 - 20 mmol/L     | [facility]   |
|                |                          |                   | LABORATORY   |
+----------------+--------------------------+-------------------+--------------+
| GLUCOSE        | 153 (H)                  | 65 - 99 mg/dL     | [facility]   |
|                |                          |                   | LABORATORY   |
+----------------+--------------------------+-------------------+--------------+
| BUN            | 15                       | 8 - 25 mg/dL      | [facility]   |
|                |                          |                   | LABORATORY   |
+----------------+--------------------------+-------------------+--------------+
| CREATININE     | 4.9 (H)                  | 0.50 - 1.00 mg/dL | [facility]   |
|                |                          |                   | LABORATORY   |
+----------------+--------------------------+-------------------+--------------+
| BUN/CREAT      | 3                        |                   | [facility]   |
|                |                          |                   | LABORATORY   |
+----------------+--------------------------+-------------------+--------------+
| CALCIUM        | 9.6                      | 8.5 - 10.5 mg/dL  | [facility]   |
|                |                          |                   | LABORATORY   |
+----------------+--------------------------+-------------------+--------------+
| TOTAL PROTEIN  | 6.1 (L)                  | 6.3 - 8.2 g/dL    | [facility]   |
|                |                          |                   | LABORATORY   |
+----------------+--------------------------+-------------------+--------------+
| Albumin        | 2.6 (L)                  | 3.3 - 4.8 g/dL    | [facility]   |
|                |                          |                   | LABORATORY   |
+----------------+--------------------------+-------------------+--------------+
| GLOBULIN       | 3.5                      | 1.3 - 4.9 g/dL    | [facility]   |
|                |                          |                   | LABORATORY   |
+----------------+--------------------------+-------------------+--------------+
| A/G            | 0.7 (L)                  | 1.0 - 2.4         | [facility]   |
|                |                          |                   | LABORATORY   |
+----------------+--------------------------+-------------------+--------------+
| TBIL           | 0.5                      | 0.1 - 1.5 mg/dL   | [facility]   |
|                |                          |                   | LABORATORY   |
+----------------+--------------------------+-------------------+--------------+
| ALK PHOS       | 85                       | 35 - 115 U/L      | [facility]   |
|                |                          |                   | LABORATORY   |
+----------------+--------------------------+-------------------+--------------+
| AST            | 22                       | 10 - 45 U/L       | [facility]   |
 
|                |                          |                   | LABORATORY   |
+----------------+--------------------------+-------------------+--------------+
| ALT            | 22                       | 10 - 65 U/L       | [facility]   |
|                |                          |                   | LABORATORY   |
+----------------+--------------------------+-------------------+--------------+
| EGFR           | 9 (L)Comment: GFR <60:   | >60 mL/min/1.73m2 | [facility]   |
|                | CHRONIC KIDNEY DISEASE,  |                   | LABORATORY   |
|                | IF FOUND OVER A 3 MONTH  |                   |              |
|                | PERIOD.GFR <15: KIDNEY   |                   |              |
|                | FAILURE.FOR       |                   |              |
|                | [employer], MULTIPLY THE  |                   |              |
|                | CALCULATED GFR BY        |                   |              |
|                | 1.210.This eGFR is       |                   |              |
|                | calculated using the     |                   |              |
|                | MDRD IDMS traceable      |                   |              |
|                | equation.Testing         |                   |              |
|                | performed at [facility], [address] |                   |              |
|                |  [facility],        |                   |              |
|                | [place]    [address]   |                   |              |
+----------------+--------------------------+-------------------+--------------+
 
 
 
+----------+
| Specimen |
+----------+
| Blood    |
+----------+
 
 
 
+---------------+-------------------------+---------------------+----------------+
| Performing    | Address                 | City/State/Zipcode  | Phone Number   |
| Organization  |                         |                     |                |
+---------------+-------------------------+---------------------+----------------+
|   [facility]  |   [address] [place] [address]  | [place] [address] |   [phone] |
| LABORATORY    | [address]                   |                     |                |
+---------------+-------------------------+---------------------+----------------+
 POCT glucose (2019  5:22 AM)
 
+--------------------+--------------------------+---------------+-----------------+
| Component          | Value                    | Ref Range     | Performed At    |
+--------------------+--------------------------+---------------+-----------------+
| GLUCOSE,POC SCREEN | 131 (H)Comment: Testing  | 65 - 99 mg/dL | [facility] |
|                    | performed at [facility]     |               |                 |
|                    | [address];[place]   |               |                 |
|                    | [address]                    |               |                 |
+--------------------+--------------------------+---------------+-----------------+
 
 
 
+-------------------+------------------+--------------------+--------------+
| Performing        | Address          | City/State/Zipcode | Phone Number |
| Organization      |                  |                    |              |
+-------------------+------------------+--------------------+--------------+
|   [facility] |   [address] | [place] [address] |              |
+-------------------+------------------+--------------------+--------------+
 POCT glucose (2019  9:02 PM)
 
+--------------------+--------------------------+---------------+-----------------+
 
| Component          | Value                    | Ref Range     | Performed At    |
+--------------------+--------------------------+---------------+-----------------+
| GLUCOSE,POC SCREEN | 164 (H)Comment: Testing  | 65 - 99 mg/dL | [facility] |
|                    | performed at [facility];[address]     |               |                 |
|                    | [address];[place], [place]   |               |                 |
|                    | [id number]                    |               |                 |
+--------------------+--------------------------+---------------+-----------------+
 
 
 
+-------------------+------------------+--------------------+--------------+
| Performing        | Address          | City/State/Zipcode | Phone Number |
| Organization      |                  |                    |              |
+-------------------+------------------+--------------------+--------------+
|   [facility] |   [address] | [place] [address] |              |
+-------------------+------------------+--------------------+--------------+
 POCT glucose (2019  4:16 PM)
 
+--------------------+--------------------------+---------------+-----------------+
| Component          | Value                    | Ref Range     | Performed At    |
+--------------------+--------------------------+---------------+-----------------+
| GLUCOSE,POC SCREEN | 143 (H)Comment: Testing  | 65 - 99 mg/dL | [facility] |
|                    | performed at [facility]     |               |                 |
|                    | [address];[place]   |               |                 |
|                    | [id number]                    |               |                 |
+--------------------+--------------------------+---------------+-----------------+
 
 
 
+-------------------+------------------+--------------------+--------------+
| Performing        | Address          | City/State/Zipcode | Phone Number |
| Organization      |                  |                    |              |
+-------------------+------------------+--------------------+--------------+
|   [facility] |   [address] | [place] [address] |              |
+-------------------+------------------+--------------------+--------------+
 POCT glucose (2019 12:17 PM)
 
+--------------------+--------------------------+---------------+-----------------+
| Component          | Value                    | Ref Range     | Performed At    |
+--------------------+--------------------------+---------------+-----------------+
| GLUCOSE,POC SCREEN | 129 (H)Comment: Testing  | 65 - 99 mg/dL | [facility] |
|                    | performed at [facility];[address]     |               |                 |
|                    | [address];[place]   |               |                 |
|                    | [address]                    |               |                 |
+--------------------+--------------------------+---------------+-----------------+
 
 
 
+-------------------+------------------+--------------------+--------------+
| Performing        | Address          | City/State/Zipcode | Phone Number |
| Organization      |                  |                    |              |
+-------------------+------------------+--------------------+--------------+
|   [facility] |   [address] | [facility] [place] [address] |              |
+-------------------+------------------+--------------------+--------------+
 Renal function panel (2019  8:47 AM)
 
+----------------+--------------------------+-------------------+-----------------+
| Component      | Value                    | Ref Range         | Performed At    |
+----------------+--------------------------+-------------------+-----------------+
| SODIUM         | 139                      | 135 - 145 mmol/L  | [facility] |
 
+----------------+--------------------------+-------------------+-----------------+
| POTASSIUM      | 4.4                      | 3.5 - 4.9 mmol/L  | [facility] |
+----------------+--------------------------+-------------------+-----------------+
| CHLORIDE       | 99                       | 99 - 109 mmol/L   | [facility] |
+----------------+--------------------------+-------------------+-----------------+
| CO2            | 32                       | 23 - 32 mmol/L    | [facility] |
+----------------+--------------------------+-------------------+-----------------+
| ANION GAP AGAP | 12                       | 5 - 20 mmol/L     | [facility] |
+----------------+--------------------------+-------------------+-----------------+
| GLUCOSE        | 197 (H)                  | 65 - 99 mg/dL     | [facility] |
+----------------+--------------------------+-------------------+-----------------+
| BUN            | 24                       | 8 - 25 mg/dL      | [facility] |
+----------------+--------------------------+-------------------+-----------------+
| CREATININE     | 6.5 (H)                  | 0.50 - 1.00 mg/dL | [facility] |
+----------------+--------------------------+-------------------+-----------------+
| CALCIUM        | 8.7                      | 8.5 - 10.5 mg/dL  | [facility] |
+----------------+--------------------------+-------------------+-----------------+
| Albumin        | 2.2 (L)                  | 3.3 - 4.8 g/dL    | [facility] LABORATORY |
+----------------+--------------------------+-------------------+-----------------+
| PHOSPHORUS     | 4.4                      | 2.3 - 4.8 mg/dL   | [facility] LABORATORY |
+----------------+--------------------------+-------------------+-----------------+
| EGFR           | 6 (L)Comment: GFR <60:   | >60 mL/min/1.73m2 | [facility] LABORATORY |
|                | CHRONIC KIDNEY DISEASE,  |                   |                 |
|                | IF FOUND OVER A 3 MONTH  |                   |                 |
|                | PERIOD.GFR <15: KIDNEY   |                   |                 |
|                | FAILURE.FOR       |                   |                 |
|                | AMERIC[employer], MULTIPLY THE  |                   |                 |
|                | CALCULATED GFR BY        |                   |                 |
|                | 1.210.This eGFR is       |                   |                 |
|                | calculated using the     |                   |                 |
|                | MDRD IDMS traceable      |                   |                 |
|                | equation.Testing         |                   |                 |
|                | performed at [facility];[address]     |                   |                 |
|                | [facility];[place], [place]   |                   |                 |
|                | [id number]                    |                   |                 |
+----------------+--------------------------+-------------------+-----------------+
 
 
 
+----------+
| Specimen |
+----------+
| Blood    |
+----------+
 
 
 
+-------------------+------------------+--------------------+--------------+
| Performing        | Address          | City/State/Zipcode | Phone Number |
| Organization      |                  |                    |              |
+-------------------+------------------+--------------------+--------------+
|   [facility] LABORATORY |   [address] | [place] [address] |              |
+-------------------+------------------+--------------------+--------------+
 CBC w/auto diff (reflex to manual) (2019  8:47 AM)
 
+-----------------+-------------------------+-------------------+-----------------+
| Component       | Value                   | Ref Range         | Performed At    |
+-----------------+-------------------------+-------------------+-----------------+
| WBC             | 4.23                    | 3.80 - 11.00 K/uL | [facility] |
+-----------------+-------------------------+-------------------+-----------------+
 
| RBC             | 2.50 (L)                | 3.70 - 5.10 M/uL  | [facility] |
+-----------------+-------------------------+-------------------+-----------------+
| HGB             | 8.5 (L)                 | 11.3 - 15.5 g/dL  | [facility] |
+-----------------+-------------------------+-------------------+-----------------+
| HCT             | 26.0 (L)                | 34.0 - 46.0 %     | [facility] |
+-----------------+-------------------------+-------------------+-----------------+
| MCV             | 103.8 (H)               | 80.0 - 100.0 fl   | [facility] |
+-----------------+-------------------------+-------------------+-----------------+
| MCH             | 33.9                    | 27.0 - 34.0 pg    | [facility] |
+-----------------+-------------------------+-------------------+-----------------+
| MCHC            | 32.6                    | 32.0 - 35.5 g/dL  | [facility] |
+-----------------+-------------------------+-------------------+-----------------+
| RDW SD          | 66.1 (H)                | 37 - 53 fl        | [facility] |
+-----------------+-------------------------+-------------------+-----------------+
| PLT             | 134 (L)                 | 150 - 400 K/uL    | [facility] |
+-----------------+-------------------------+-------------------+-----------------+
| MPV             | 8.7                     | fl                | [facility] |
+-----------------+-------------------------+-------------------+-----------------+
| DIFF TYPE       | AUTOMATED               |                   | [facility] |
+-----------------+-------------------------+-------------------+-----------------+
| NEUTROPHILS     | 74.02                   | %                 | [facility] |
+-----------------+-------------------------+-------------------+-----------------+
| LYMPHOCYTES     | 16.94                   | %                 | [facility] |
+-----------------+-------------------------+-------------------+-----------------+
| MONOCYTES       | 7.65                    | %                 | [facility] |
+-----------------+-------------------------+-------------------+-----------------+
| EOSINOPHILS     | 0.00                    | %                 | [facility] |
+-----------------+-------------------------+-------------------+-----------------+
| BASOPHILS       | 1.39                    | %                 | [facility] |
+-----------------+-------------------------+-------------------+-----------------+
| NEUTROPHILS ABS | 3.13                    | 1.90 - 7.40 K/uL  | [facility] |
+-----------------+-------------------------+-------------------+-----------------+
| LYMPHOCYTES ABS | 0.72 (L)                | 1.00 - 3.90 K/uL  | [facility] |
+-----------------+-------------------------+-------------------+-----------------+
| MONOCYTES ABS   | 0.32                    | 0.00 - 0.80 K/uL  | [facility] |
+-----------------+-------------------------+-------------------+-----------------+
| EOSINOPHILS ABS | 0.00                    | 0.00 - 0.50 K/uL  | [facility] |
+-----------------+-------------------------+-------------------+-----------------+
| BASOPHILS ABS   | 0.06Comment: Testing    | 0.00 - 0.10 K/uL  | [facility] |
|                 | performed at [facility]    |                   |                 |
|                 | [address];[place]  |                   |                 |
|                 | [id number]                   |                   |                 |
+-----------------+-------------------------+-------------------+-----------------+
 
 
 
+----------+
| Specimen |
+----------+
| Blood    |
+----------+
 
 
 
+-------------------+------------------+--------------------+--------------+
| Performing        | Address          | City/State/Zipcode | Phone Number |
| Organization      |                  |                    |              |
+-------------------+------------------+--------------------+--------------+
|   [facility] |   [address] | [place] [address] |              |
+-------------------+------------------+--------------------+--------------+
 
 EKG STANDARD 12 LEAD (2019  5:50 AM)
 
+-------------------+--------------------------+-----------+--------------+
| Component         | Value                    | Ref Range | Performed At |
+-------------------+--------------------------+-----------+--------------+
| Ventricular Rate  | 87                       | BPM       | [facility]     |
+-------------------+--------------------------+-----------+--------------+
| Atrial Rate       | 87                       | BPM       | [facility]     |
+-------------------+--------------------------+-----------+--------------+
| P-R Interval      | 138                      | ms        | [facility]     |
+-------------------+--------------------------+-----------+--------------+
| QRS Duration      | 122                      | ms        | [facility]     |
+-------------------+--------------------------+-----------+--------------+
| Q-T Interval      | 420                      | ms        | [facility]     |
+-------------------+--------------------------+-----------+--------------+
| QTC Calculation   | 505                      | ms        | [facility]     |
| (Bezet)           |                          |           |              |
+-------------------+--------------------------+-----------+--------------+
| Calculated P Axis | 69                       | degrees   | [facility]     |
+-------------------+--------------------------+-----------+--------------+
| Calculated R Axis | -28                      | degrees   | [facility]     |
+-------------------+--------------------------+-----------+--------------+
| Calculated T Axis | 67                       | degrees   | [facility]     |
+-------------------+--------------------------+-----------+--------------+
| Diagnosis         | Normal sinus             |           | [facility]     |
|                   | rhythmNon-specific       |           |              |
|                   | intra-ventricular        |           |              |
|                   | conduction               |           |              |
|                   | delayNonspecific ST      |           |              |
|                   | abnormalityPoor R -      |           |              |
|                   | progressionWhen compared |           |              |
|                   |  with ECG of 2019 |           |              |
|                   |  15:01,Premature         |           |              |
|                   | ventricular complexes    |           |              |
|                   | are no longer PresentT   |           |              |
|                   | wave inversion no longer |           |              |
|                   |  evident in Inferior     |           |              |
|                   | leadsConfirmed by JOVITA,  |           |              |
|                   | JACEY ([id number]) on 2019 |           |              |
|                   |  4:19:02 PM              |           |              |
+-------------------+--------------------------+-----------+--------------+
 
 
 
+--------------+-------------------+--------------------+--------------+
| Performing   | Address           | City/State/Zipcode | Phone Number |
| Organization |                   |                    |              |
+--------------+-------------------+--------------------+--------------+
|   [facility]   |   [address] | [place] [address] |              |
+--------------+-------------------+--------------------+--------------+
 POCT glucose (2019  5:18 AM)
 
+--------------------+--------------------------+---------------+-----------------+
| Component          | Value                    | Ref Range     | Performed At    |
+--------------------+--------------------------+---------------+-----------------+
| GLUCOSE,POC SCREEN | 167 (H)Comment: Testing  | 65 - 99 mg/dL | [facility] |
|                    | performed at [facility]     |               |                 |
|                    | [address];[place]   |               |                 |
|                    | [id number]                    |               |                 |
+--------------------+--------------------------+---------------+-----------------+
 
 
 
 
+-------------------+------------------+--------------------+--------------+
| Performing        | Address          | City/State/Zipcode | Phone Number |
| Organization      |                  |                    |              |
+-------------------+------------------+--------------------+--------------+
|   [facility] |   [address] | [place] [address] |              |
+-------------------+------------------+--------------------+--------------+
 POCT glucose (2019 10:42 PM)
 
+--------------------+--------------------------+---------------+-----------------+
| Component          | Value                    | Ref Range     | Performed At    |
+--------------------+--------------------------+---------------+-----------------+
| GLUCOSE,POC SCREEN | 207 (H)Comment: Testing  | 65 - 99 mg/dL | [facility] |
|                    | performed at [facility];[address]     |               |                 |
|                    | [address];[place]   |               |                 |
|                    | [id number]                    |               |                 |
+--------------------+--------------------------+---------------+-----------------+
 
 
 
+-------------------+------------------+--------------------+--------------+
| Performing        | Address          | City/State/Zipcode | Phone Number |
| Organization      |                  |                    |              |
+-------------------+------------------+--------------------+--------------+
|   [facility] |   [address] | [place] [address] |              |
+-------------------+------------------+--------------------+--------------+
 POCT glucose (2019  9:16 PM)
 
+--------------------+--------------------------+---------------+-----------------+
| Component          | Value                    | Ref Range     | Performed At    |
+--------------------+--------------------------+---------------+-----------------+
| GLUCOSE,POC SCREEN | 213 (H)Comment: Testing  | 65 - 99 mg/dL | [facility] |
|                    | performed at [facility]     |               |                 |
|                    | [address];[place]   |               |                 |
|                    | [address]                    |               |                 |
+--------------------+--------------------------+---------------+-----------------+
 
 
 
+-------------------+------------------+--------------------+--------------+
| Performing        | Address          | City/State/Zipcode | Phone Number |
| Organization      |                  |                    |              |
+-------------------+------------------+--------------------+--------------+
|   [facility] |   [address] | [facility] [place] [address] |              |
+-------------------+------------------+--------------------+--------------+
 Troponin I (2019  5:47 PM)
 
+------------+--------------------------+-------------------+-----------------+
| Component  | Value                    | Ref Range         | Performed At    |
+------------+--------------------------+-------------------+-----------------+
| TROPONIN I | 0.061Comment:   0.00 to  | 0.00 - 0.10 ng/mL | [facility] |
|            | 0.10     CONSISTENT WITH |                   |                 |
|            |  NORMAL POPULATION0.11   |                   |                 |
|            | to 0.60     CONSISTENT   |                   |                 |
|            | WITH INCREASED RISK FOR  |                   |                 |
|            | ADVERSE OUTCOMES>        |                   |                 |
|            | 0.60                     |                   |                 |
|            | CONSISTENT WITH WHO      |                   |                 |
 
|            | CRITERIA FOR ACUTE MI    |                   |                 |
|            | Testing performed at     |                   |                 |
|            | [facility];[address]                   |                 |
|            [address];[place] [address]   |                   |                 |
+------------+--------------------------+-------------------+-----------------+
 
 
 
+----------+
| Specimen |
+----------+
| Blood    |
+----------+
 
 
 
+-------------------+------------------+--------------------+--------------+
| Performing        | Address          | City/State/Zipcode | Phone Number |
| Organization      |                  |                    |              |
+-------------------+------------------+--------------------+--------------+
|   [facility] |   [address] | [place] [address] |              |
+-------------------+------------------+--------------------+--------------+
 POCT glucose (2019  4:56 PM)
 
+--------------------+--------------------------+---------------+-----------------+
| Component          | Value                    | Ref Range     | Performed At    |
+--------------------+--------------------------+---------------+-----------------+
| GLUCOSE,POC SCREEN | 133 (H)Comment: Testing  | 65 - 99 mg/dL | [facility] |
|                    | performed at [facility];[address]     |               |                 |
|                    | [address];[place]   |               |                 |
|                    | [id number]                    |               |                 |
+--------------------+--------------------------+---------------+-----------------+
 
 
 
+-------------------+------------------+--------------------+--------------+
| Performing        | Address          | City/State/Zipcode | Phone Number |
| Organization      |                  |                    |              |
+-------------------+------------------+--------------------+--------------+
|   [facility] |   [address] | [place] [address] |              |
+-------------------+------------------+--------------------+--------------+
 EKG STANDARD 12 LEAD (2019  3:01 PM)
 
+-------------------+--------------------------+-----------+--------------+
| Component         | Value                    | Ref Range | Performed At |
+-------------------+--------------------------+-----------+--------------+
| Ventricular Rate  | 86                       | BPM       | [facility]     |
+-------------------+--------------------------+-----------+--------------+
| Atrial Rate       | 86                       | BPM       | [facility]     |
+-------------------+--------------------------+-----------+--------------+
| P-R Interval      | 138                      | ms        | [facility]     |
+-------------------+--------------------------+-----------+--------------+
| QRS Duration      | 120                      | ms        | [facility]     |
+-------------------+--------------------------+-----------+--------------+
| Q-T Interval      | 450                      | ms        | [facility]     |
+-------------------+--------------------------+-----------+--------------+
| QTC Calculation   | 538                      | ms        | [facility]     |
| (Bezet)           |                          |           |              |
+-------------------+--------------------------+-----------+--------------+
| Calculated P Axis | 60                       | degrees   | [facility]     |
 
+-------------------+--------------------------+-----------+--------------+
| Calculated R Axis | -31                      | degrees   | [facility]     |
+-------------------+--------------------------+-----------+--------------+
| Calculated T Axis | -32                      | degrees   | [facility]     |
+-------------------+--------------------------+-----------+--------------+
| Diagnosis         | Sinus rhythm with        |           | [facility]     |
|                   | frequent Premature       |           |              |
|                   | ventricular              |           |              |
|                   | complexesLeft axis       |           |              |
|                   | deviationLeft bundle     |           |              |
|                   | branch block,            |           |              |
|                   | completeNonspecific ST   |           |              |
|                   | and T wave               |           |              |
|                   | abnormalityAbnormal      |           |              |
|                   | ECGWhen compared with    |           |              |
|                   | ECG of 2019       |           |              |
|                   | 09:44,Premature          |           |              |
|                   | ventricular complexes    |           |              |
|                   | are now PresentT wave    |           |              |
|                   | inversion now evident in |           |              |
|                   |  Inferior leadsConfirmed |           |              |
|                   |  by JACEY HUSSEIN (209) on |           |              |
|                   |  2019 4:46:52 PM    |           |              |
+-------------------+--------------------------+-----------+--------------+
 
 
 
+--------------+-------------------+--------------------+--------------+
| Performing   | Address           | City/State/Zipcode | Phone Number |
| Organization |                   |                    |              |
+--------------+-------------------+--------------------+--------------+
|   [facility]   |   [address] | [place] [address] |              |
+--------------+-------------------+--------------------+--------------+
 Troponin I (2019  2:11 PM)
 
+------------+--------------------------+-------------------+-----------------+
| Component  | Value                    | Ref Range         | Performed At    |
+------------+--------------------------+-------------------+-----------------+
| TROPONIN I | 0.075Comment:   0.00 to  | 0.00 - 0.10 ng/mL | [facility] |
|            | 0.10     CONSISTENT WITH |                   |                 |
|            |  NORMAL POPULATION0.11   |                   |                 |
|            | to 0.60     CONSISTENT   |                   |                 |
|            | WITH INCREASED RISK FOR  |                   |                 |
|            | ADVERSE OUTCOMES>        |                   |                 |
|            | 0.60                     |                   |                 |
|            | CONSISTENT WITH WHO      |                   |                 |
|            | CRITERIA FOR ACUTE MI    |                   |                 |
|            | Testing performed at     |                   |                 |
|            | [facility]            |                   |                 |
|            | [address];[place] [address]   |                   |                 |
+------------+--------------------------+-------------------+-----------------+
 
 
 
+----------+
| Specimen |
+----------+
| Blood    |
+----------+
 
 
 
 
+-------------------+------------------+--------------------+--------------+
| Performing        | Address          | City/State/Zipcode | Phone Number |
| Organization      |                  |                    |              |
+-------------------+------------------+--------------------+--------------+
|   [facility] |   [address] | [place] [address] |              |
+-------------------+------------------+--------------------+--------------+
 ISTAT Troponin (2019 10:26 AM)
 
+----------------+-------+-----------+--------------+
| Component      | Value | Ref Range | Performed At |
+----------------+-------+-----------+--------------+
| POC Troponin I | 0.04  |           |              |
+----------------+-------+-----------+--------------+
 POC cardiac troponin (2019  9:54 AM)
 
+----------------------+--------------------------+-------------------+-----------------+
| Component            | Value                    | Ref Range         | Performed At    |
+----------------------+--------------------------+-------------------+-----------------+
| POC CARDIAC TROPONIN | 0.04Comment:   0.00 to   | 0.00 - 0.10 ng/mL | [facility] |
|                      | 0.10    Consistent with  |                   |                 |
|                      | normal population0.11 to |                   |                 |
|                      |  0.40    Consistent with |                   |                 |
|                      |  increased risk for      |                   |                 |
|                      | adverse                  |                   |                 |
|                      | outcomes>0.40            |                   |                 |
|                      |       Consistent with    |                   |                 |
|                      | WHO criteria for Acute   |                   |                 |
|                      | MI Testing performed at  |                   |                 |
|                      | [facility] [place] [address]            |                   |                 |
|                      | [facility];[place] [address]   |                   |                 |
+----------------------+--------------------------+-------------------+-----------------+
 
 
 
+-------------------+------------------+--------------------+--------------+
| Performing        | Address          | City/State/Zipcode | Phone Number |
| Organization      |                  |                    |              |
+-------------------+------------------+--------------------+--------------+
|   [facility] |   [address] | [place] [address] |              |
+-------------------+------------------+--------------------+--------------+
 ED INFORMATION EXCHANGE (2019  9:47 AM)
 
+------------------------------------------------------------------------+--------------+
| Narrative                                                              | Performed At |
+------------------------------------------------------------------------+--------------+
|   [id number]     This patient has registered at the     |   ED         |
| [facility] Emergency Department   For more         | INFORMATION  |
| information visit:                                                     | EXCHANGE     |
| [email or web address] |              |
| [id number]   Security Events  No recent Security Events currently on file  |              |
|     ED Care Guidelines from [facility]  Last Updated: 18 |              |
|  10:16 AM         Other Information:  Currently being seen by [facility] |              |
|  in [place] for mental health concerns.[phone]  These are       |              |
| guidelines and the provider should exercise clinical judgment when     |              |
| providing care.  Additional plans are also available online from the   |              |
| following facilities: [facility]   Recent Emergency          |              |
| Department Visit Summary  Admit Date Facility City State Type Major    |              |
| Type Diagnoses or Chief Complaint   2019 [facility]  |              |
 
| [place] Emergency    Emergency          Chest Pain        Nausea     |              |
|      Shortness of Breath      2019 [facility] [place] |              |
|  [place] Emergency    Emergency          CHEST PAIN        Abnormal levels  |              |
| of other serum enzymes        Personal history of other diseases of    |              |
| the circulatory system        Chest pain, unspecified      2019 |              |
|  [facility] [place] Emergency    Emergency                 |              |
|     FISTULA BLEEDING        Hemorrhage due to vascular prosthetic      |              |
| devices, implants and grafts, initial encounter      Dec 22, 2018 [facility] |              |
|  [facility] [place] Emergency    Emergency          CHEST PAIN  |              |
|        Type 2 diabetes mellitus with hyperglycemia        Chest pain,  |              |
| unspecified      2018 [facility] [place]            |              |
| Emergency    Emergency    Chief Complaint: SOB      2018 [facility]  |              |
| [facility] [place] Emergency    Emergency          FALL         |              |
| Unspecified fall, initial encounter        Dependence on renal         |              |
| dialysis        End stage renal disease      2018 [facility]         |              |
| [facility] [place] Emergency    Emergency          FALL         |              |
| Essential (primary) hypertension        Type 2 diabetes mellitus with  |              |
| hyperglycemia        Type 2 diabetes mellitus with unspecified         |              |
| complications        Unspecified fall, initial encounter               |              |
| Headache      2018 [facility] [place]               |              |
| Emergency    Emergency          -1. Dorsalgia, unspecified        -1.  |              |
| Painful micturition, unspecified        0. Frequency of micturition    |              |
|      1. Urinary tract infection, site not specified        6. Type 2   |              |
| diabetes mellitus with hyperglycemia        7. Type 2 diabetes         |              |
| mellitus with diabetic chronic kidney disease        8. End stage      |              |
| renal disease        9. Dependence on renal dialysis        10.        |              |
| Shortness of breath        11. Long term (current) use of              |              |
| anticoagulants            E.D. Visit Count (12 mo.)  Facility Visits   |              |
| Low Acuity   [facility] 19 0   [facility] 2 0 |              |
|    [facility] 2 0   Total 23 0   Note: Visits      |              |
| indicate total known visits. Medicaid Low Acuity Dx are the number of  |              |
| primary diagnoses on the Medicaid's Low Acuity dx list.         Recent |              |
|  Inpatient Visit Summary  Admit Date Facility City State Type Major    |              |
| Type Diagnoses or Chief Complaint   [facility] [place] [place] [facility]    Inpatient          Peripheral arterial disease,  |              |
| osteomyelitis left foot        Other acute osteomyelitis, left ankle   |              |
| and foot        Long term (current) use of antibiotics        End      |              |
| stage renal disease        Anemia in chronic kidney disease      Dec   |              |
| [facility] [place] [place] [facility]                         |              |
| Medicine    Inpatient          Peripheral vascular disease,            |              |
| unspecified        End stage renal disease        Dependence on renal  |              |
| dialysis        Long term (current) use of insulin        Other        |              |
| chronic osteomyelitis, left ankle and foot        Type 2 diabetes      |              |
| mellitus with diabetic chronic kidney disease        Essential         |              |
| (primary) hypertension      [facility] [place]   |              |
| [place] Inpatient    Inpatient          Upper GIB        Other chronic      |              |
| osteomyelitis, unspecified ankle and foot        End stage renal       |              |
| disease        Other specified personal risk factors, not elsewhere    |              |
| classified        Chronic systolic (congestive) heart failure          |              |
| Anemia in chronic kidney disease        Other disorders of             |              |
| plasma-protein metabolism, not elsewhere classified        Moderate    |              |
| protein-calorie malnutrition        Other disorders of phosphorus      |              |
| metabolism      2018 [facility]. [place] Medical     |              |
| Surgical    Inpatient          1. Type 2 diabetes mellitus with other  |              |
| specified complication        2. Urinary tract infection, site not     |              |
| specified        3. Unspecified protein-calorie malnutrition        4. |              |
|  Other chronic osteomyelitis, unspecified site        5. Hypertensive  |              |
| heart and chronic kidney disease with heart failure and with stage 5   |              |
| chronic kidney disease, or end stage renal disease        6. Chronic   |              |
| diastolic (congestive) heart failure        7. Other osteomyelitis,    |              |
 
| ankle and foot        8. Type 2 diabetes mellitus with foot ulcer      |              |
|      9. Atherosclerotic heart disease of native coronary artery        |              |
| without angina pectoris        10. Chronic obstructive pulmonary       |              |
| disease, unspecified      2018 [facility] [place] [place]    |              |
| Medical Surgical    Inpatient          1. Benign paroxysmal vertigo,   |              |
| unspecified ear        2. End stage renal disease        3.            |              |
| Hypertensive chronic kidney disease with stage 5 chronic kidney        |              |
| disease or end stage renal disease        4. Urinary tract infection,  |              |
| site not specified        5. Hypertensive heart and chronic kidney     |              |
| disease with heart failure and with stage 5 chronic kidney disease, or |              |
|  end stage renal disease        6. Kidney transplant status        7.  |              |
| Unspecified systolic (congestive) heart failure        8. Syncope and  |              |
| collapse        9. Type 2 diabetes mellitus with diabetic chronic      |              |
| kidney disease        10. Atherosclerotic heart disease of native      |              |
| coronary artery without angina pectoris            Prescription Drug   |              |
| Report (12 Mo.)  PDMP query found no report.     Care Providers        |              |
| Provider PRC Type Phone Fax Service Dates   HEADINGS, SANTIAGO, F.N.P.     |              |
| Nurse Practitioner: Family     Current      [facility]     |              |
| Care [phone]    Oct 1, 2016 - Current      [facility]       |              |
| [facility]    [phone] Current      [facility]       |              |
| [facility]     Current      [employer]   |              |
| Mental Health Provider [phone] Current           |              |
| [employer] Case or Care Manager     Current      Willard Crook -       |              |
| MIRTA Ortiz Case or Care Manager [phone]     |              |
| Current      Jairo Gutierrez MD Other     Current         Known      |              |
| Aliases  No known aliases.   Criteria met         Care Guidelines      |              |
|     10 in 12      3 in 60      2 in 2     The above information is     |              |
| provided for the sole purpose of patient treatment. Use of this        |              |
| information beyond the terms of Data Sharing Memorandum of             |              |
| Understanding and License Agreement is prohibited. In certain cases    |              |
| not all visits may be represented.   Consult the aforementioned        |              |
| facilities for additional information.   2019 [employer]       |              |
| [employer] - [place], [place] -                              |              |
| [email or web address]                                         |              |
+------------------------------------------------------------------------+--------------+
 
 
 
+-------------------------------------------------------------------------------------------
--------------------------------------------------------------------------------------------
--------------------+
| Procedure Note                                                                            
                                                                                            
                    |
+-------------------------------------------------------------------------------------------
--------------------------------------------------------------------------------------------
--------------------+
|   Interface, Lab - 2019  9:48 AM PST  Formatting of this note may be different      
                                                                                            
                    |
| from the original.[id number] [id number] patient has registered at the [facility]      
                                                                                            
                    |
| [facility] Emergency Department For more information visit:                  
                                                                                            
                    |
| [email or web address] Security     
                                                                                            
                    |
| EventsNo recent Security Events currently on fileED Care Guidelines from [employer] -       
 
                                                                                            
                    |
| [place] Updated: 18 10:16 AM  Other Information:Currently being seen by         
                                                                                            
                    |
| [facility] in [place] for mental health concerns.[phone] are guidelines and     
                                                                                            
                    |
| the provider should exercise clinical judgment when providing care.Additional plans are   
                                                                                            
                    |
| also available online from the following facilities: [email or web address] Recent          
                                                                                            
                    |
| Emergency Department Visit SummaryAdmit Date Facility City State Type Major Type          
                                                                                            
                    |
| Diagnoses [facility] [place] Emergency        
                                                                                            
                    |
| Emergency    Chest Pain    Nausea    Shortness of Breath  2019 [facility]      
                                                                                            
                    |
| [facility] [place]. [place] Emergency  Emergency    CHEST PAIN    Abnormal levels of other serum     
                                                                                            
                    |
| enzymes    Personal history of other diseases of the circulatory system    Chest pain,    
                                                                                            
                    |
| unspecified  2019 [facility] [place] Emergency  Emergency    FISTULA   
                                                                                            
                    |
| BLEEDING    Hemorrhage due to vascular prosthetic devices, implants and grafts, initial   
                                                                                            
                    |
| encounter  Dec 22, 2018 [facility] [place] Emergency  Emergency    CHEST      
                                                                                            
                    |
| PAIN    Type 2 diabetes mellitus with hyperglycemia    Chest pain, unspecified  ,   
                                                                                            
                    |
|  [facility] [place] Emergency  Emergency  Chief Complaint: SOB  ,    
                                                                                            
                    |
| 2018 [facility] [place] Emergency  Emergency    FALL    Unspecified fall,     
                                                                                            
                    |
| initial encounter    Dependence on renal dialysis    End stage renal disease  ,     
                                                                                            
                    |
|  [facility] [place] Emergency  Emergency    FALL    Essential (primary)   
                                                                                            
                    |
| hypertension    Type 2 diabetes mellitus with hyperglycemia    Type 2 diabetes mellitus   
                                                                                            
                    |
| with unspecified complications    Unspecified fall, initial encounter    Headache  Nov    
                                                                                            
                    |
| 2018 [facility] [place] [place] Emergency  Emergency    -1. Dorsalgia,             
 
                                                                                            
                    |
| unspecified    -1. Painful micturition, unspecified    0. Frequency of micturition    1.  
                                                                                            
                    |
|  Urinary tract infection, site not specified    6. Type 2 diabetes mellitus with          
                                                                                            
                    |
| hyperglycemia    7. Type 2 diabetes mellitus with diabetic chronic kidney disease    8.   
                                                                                            
                    |
| End stage renal disease    9. Dependence on renal dialysis    10. Shortness of breath     
                                                                                            
                    |
|  11. Long term (current) use of anticoagulants  E.D. Visit Count (12 mo.)Facility Visits  
                                                                                            
                    |
|  Low Acuity [facility] 19 0 [facility] 2 0 [facility]       
                                                                                            
                    |
| [facility] 2 0 Total 23 0 Note: Visits indicate total known visits. Medicaid Low      
                                                                                            
                    |
| Acuity Dx are the number of primary diagnoses on the Medicaid's Low Acuity dx list.       
                                                                                            
                    |
| Recent Inpatient Visit SummaryAdmit Date Facility City State Type Major Type Diagnoses    
                                                                                            
                    |
| or Chief Complaint Dec 27, 2018 [facility] [place] Recovery  Inpatient        
                                                                                            
                    |
| Peripheral arterial disease, osteomyelitis left foot    Other acute osteomyelitis, left   
                                                                                            
                    |
| ankle and foot    Long term (current) use of antibiotics    End stage renal disease       
                                                                                            
                    |
| Anemia in chronic kidney disease  Dec 5, 2018 [facility]      
                                                                                            
                    |
| Medicine  Inpatient    Peripheral vascular disease, unspecified    End stage renal        
                                                                                            
                    |
| disease    Dependence on renal dialysis    Long term (current) use of insulin    Other    
                                                                                            
                    |
| chronic osteomyelitis, left ankle and foot    Type 2 diabetes mellitus with diabetic      
                                                                                            
                    |
| chronic kidney disease    Essential (primary) hypertension  2018 [facility]  
                                                                                            
                    |
|  [place]. [place] Inpatient  Inpatient    Upper GIB    Other chronic osteomyelitis,         
                                                                                            
                    |
| unspecified ankle and foot    End stage renal disease    Other specified personal risk    
                                                                                            
                    |
| factors, not elsewhere classified    Chronic systolic (congestive) heart failure          
 
                                                                                            
                    |
| Anemia in chronic kidney disease    Other disorders of plasma-protein metabolism, not     
                                                                                            
                    |
| elsewhere classified    Moderate protein-calorie malnutrition    Other disorders of       
                                                                                            
                    |
| phosphorus metabolism  2018 [facility] [place]. [place] Medical Surgical        
                                                                                            
                    |
| Inpatient    1. Type 2 diabetes mellitus with other specified complication    2. Urinary  
                                                                                            
                    |
|  tract infection, site not specified    3. Unspecified protein-calorie malnutrition       
                                                                                            
                    |
| 4. Other chronic osteomyelitis, unspecified site    5. Hypertensive heart and chronic     
                                                                                            
                    |
| kidney disease with heart failure and with stage 5 chronic kidney disease, or end stage   
                                                                                            
                    |
| renal disease    6. Chronic diastolic (congestive) heart failure    7. Other              
                                                                                            
                    |
| osteomyelitis, ankle and foot    8. Type 2 diabetes mellitus with foot ulcer    9.        
                                                                                            
                    |
| Atherosclerotic heart disease of native coronary artery without angina pectoris    10.    
                                                                                            
                    |
| Chronic obstructive pulmonary disease, unspecified  2018 [facility]       
                                                                                            
                    |
| [place] Medical Surgical  Inpatient    1. Benign paroxysmal vertigo, unspecified ear    
                                                                                            
                    |
|   2. End stage renal disease    3. Hypertensive chronic kidney disease with stage 5       
                                                                                            
                    |
| chronic kidney disease or end stage renal disease    4. Urinary tract infection, site     
                                                                                            
                    |
| not specified    5. Hypertensive heart and chronic kidney disease with heart failure and  
                                                                                            
                    |
|  with stage 5 chronic kidney disease, or end stage renal disease    6. Kidney transplant  
                                                                                            
                    |
|  status    7. Unspecified systolic (congestive) heart failure    8. Syncope and collapse  
                                                                                            
                    |
|     9. Type 2 diabetes mellitus with diabetic chronic kidney disease    10.               
                                                                                            
                    |
| Atherosclerotic heart disease of native coronary artery without angina pectoris           
                                                                                            
                    |
| Prescription Drug Report (12 Mo.)PDMP query found no report.[facility]    
 
                                                                                            
                    |
| Type Phone Fax Service Dates HEADINGS, PILO HENDERSON.N.P. Nurse Practitioner: Family   Current  
                                                                                            
                    |
|   [facility] [phone]  Oct 1, 2016 - Current  [facility]       
                                                                                            
                    |
| [facility]  [phone] Current  [facility]       
                                                                                            
                    |
| Primary Care   Current  79 Group Mental Health Provider [phone]         
                                                                                            
                    |
| [phone] Current  [facility] Case or Care Manager   Current  Willard Ortiz,  
                                                                                            
                    |
|  [facility] Case or Care Manager [phone] Current  Jairo Gutierrez MD     
                                                                                            
                    |
| Other   Current  Known AliasesNo known aliases. Criteria met  Care Guidelines  10 in 12   
                                                                                            
                    |
|  3 in 60  2 in 2The above information is provided for the sole purpose of patient         
                                                                                            
                    |
| treatment. Use of this information beyond the terms of Data Sharing Memorandum of         
                                                                                            
                    |
| Understanding and License Agreement is prohibited. In certain cases not all visits may    
                                                                                            
                    |
| be represented. Consult the aforementioned facilities for additional information. 2019    
                                                                                            
                    |
| [employer] - [place], [place] -                              
                                                                                            
                    |
| [email or web address]                                                            
                                                                                            
                    |
|   Other acute osteomyelitis, left ankle and foot                                          
                                                                                            
                    |
|   Long term (current) use of antibiotics                                                  
                                                                                            
                    |
|   End stage renal disease                                                                 
                                                                                            
                    |
|   Anemia in chronic kidney disease                                                        
                                                                                            
                    |
|                                                                                           
                                                                                            
                    |
|[facility] [place] [facility]  [facility]                     
                                                                                            
                    |
|  Peripheral vascular disease, unspecified                                                 
 
                                                                                            
                    |
|   End stage renal disease                                                                 
                                                                                            
                    |
|   Dependence on renal dialysis                                                            
                                                                                            
                    |
|   Long term (current) use of insulin                                                      
                                                                                            
                    |
|   Other chronic osteomyelitis, left ankle and foot                                        
                                                                                            
                    |
|   Type 2 diabetes mellitus with diabetic chronic kidney disease                           
                                                                                            
                    |
|   Essential (primary) hypertension                                                        
                                                                                            
                    |
|                                                                                           
                                                                                            
                    |
|[facility] [place] [facility]  Inpatient                           
                                                                                            
                    |
|  Upper GIB                                                                                
                                                                                            
                    |
|   Other chronic osteomyelitis, unspecified ankle and foot                                 
                                                                                            
                    |
|   End stage renal disease                                                                 
                                                                                            
                    |
|   Other specified personal risk factors, not elsewhere classified                         
                                                                                            
                    |
|   Chronic systolic (congestive) heart failure                                             
                                                                                            
                    |
|   Anemia in chronic kidney disease                                                        
                                                                                            
                    |
|   Other disorders of plasma-protein metabolism, not elsewhere classified                  
                                                                                            
                    |
|   Moderate protein-calorie malnutrition                                                   
                                                                                            
                    |
|   Other disorders of phosphorus metabolism                                                
                                                                                            
                    |
|                                                                                           
                                                                                            
                    |
|2018 [facility]. [place] [facility]  Inpatient                     
                                                                                            
                    |
|  1. Type 2 diabetes mellitus with other specified complication                            
 
                                                                                            
                    |
|   2. Urinary tract infection, site not specified                                          
                                                                                            
                    |
|   3. Unspecified protein-calorie malnutrition                                             
                                                                                            
                    |
|   4. Other chronic osteomyelitis, unspecified site                                        
                                                                                            
                    |
|   5. 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
onic kidney disease, or end stage renal disease                                             
                    |
|   6. Chronic diastolic (congestive) heart failure                                         
                                                                                            
                    |
|   7. Other osteomyelitis, ankle and foot                                                  
                                                                                            
                    |
|   8. Type 2 diabetes mellitus with foot ulcer                                             
                                                                                            
                    |
|   9. Atherosclerotic heart disease of native coronary artery without angina pectoris      
                                                                                            
                    |
|   10. Chronic obstructive pulmonary disease, unspecified                                  
                                                                                            
                    |
|                                                                                           
                                                                                            
                    |
|2018 [facility] Cox. [place] [facility]  Inpatient                      
                                                                                            
                    |
|  1. Benign paroxysmal vertigo, unspecified ear                                            
                                                                                            
                    |
|   2. End stage renal disease                                                              
                                                                                            
                    |
|   3. Hypertensive chronic kidney disease with stage 5 chronic kidney disease or end stage 
renal disease                                                                               
                    |
|   4. Urinary tract infection, site not specified                                          
                                                                                            
                    |
|   5. 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
onic kidney disease, or end stage renal disease                                             
                    |
|   6. Kidney transplant status                                                             
                                                                                            
                    |
|   7. Unspecified systolic (congestive) heart failure                                      
                                                                                            
                    |
|   8. Syncope and collapse                                                                 
                                                                                            
                    |
|   9. Type 2 diabetes mellitus with diabetic chronic kidney disease                        
 
                                                                                            
                    |
|   10. Atherosclerotic heart disease of native coronary artery without angina pectoris     
                                                                                            
                    |
|                                                                                           
                                                                                            
                    |
|                                                                                           
                                                                                            
                    |
|                                                                                           
                                                                                            
                    |
|Prescription Drug Report (12 Mo.)                                                          
                                                                                            
                    |
|PDMP query found no report.                                                                
                                                                                            
                    |
|                                                                                           
                                                                                            
                    |
|Care Providers                                                                             
                                                                                            
                    |
|Provider PRC Type Phone Fax Service Dates                                                  
                                                                                            
                    |
|HEADINGS, ELZA HENDERSON Nurse Practitioner: Family   Current                                
                                                                                            
                    |
|[facility] [phone]  Oct 1, 2016 - Current                          
                                                                                            
                    |
|[facility]  [phone] Current                               
                                                                                            
                    |
|[facility]   Current                                
                                                                                            
                    |
|79 Group Mental Health Provider [phone] Current                 
                                                                                            
                    |
|[facility] Case or Care Manager   Current                                                   
                                                                                            
                    |
|MIR[facility] Case or Care Manager [phone] Current
                                                                                            
                    |
|Jairo Gutierrez MD Other   Current                                                       
                                                                                            
                    |
|                                                                                           
                                                                                            
                    |
|Known Aliases                                                                              
                                                                                            
                    |
|No known aliases.                                                                          
 
                                                                                            
                    |
|Criteria met                                                                               
                                                                                            
                    |
|                                                                                           
                                                                                            
                    |
|  Care Guidelines                                                                          
                                                                                            
                    |
|  10 in 12                                                                                 
                                                                                            
                    |
|  3 in 60                                                                                  
                                                                                            
                    |
|  2 in 2                                                                                   
                                                                                            
                    |
|                                                                                           
                                                                                            
                    |
|The above information is provided for the sole purpose of patient treatment. Use of this in
formation beyond the terms of Data Sharing Memorandum of Understanding and License Agreement
 is prohibited. In  |
|certain cases not all visits may be represented. Consult the aforementioned facilities for 
additional information.                                                                     
                    |
|2019 [employer] - [place], [place] - [email or web address]                                                                                       
                    |
+-------------------------------------------------------------------------------------------
--------------------------------------------------------------------------------------------
--------------------+
 
 
 
+-------------------+---------+--------------------+--------------+
| Performing        | Address | City/State/Zipcode | Phone Number |
| Organization      |         |                    |              |
+-------------------+---------+--------------------+--------------+
|   ED INFORMATION  |         |                    |              |
| EXCHANGE          |         |                    |              |
+-------------------+---------+--------------------+--------------+
 EKG 12 LEAD UNIT PERFORMED (2019  9:44 AM)
 
+-------------------+--------------------------+-----------+--------------+
| Component         | Value                    | Ref Range | Performed At |
+-------------------+--------------------------+-----------+--------------+
| Ventricular Rate  | 93                       | BPM       | [facility] EKG     |
+-------------------+--------------------------+-----------+--------------+
| Atrial Rate       | 93                       | BPM       | [facility] EKG     |
+-------------------+--------------------------+-----------+--------------+
| P-R Interval      | 136                      | ms        | [facility] EKG     |
+-------------------+--------------------------+-----------+--------------+
| QRS Duration      | 126                      | ms        | [facility] EKG     |
+-------------------+--------------------------+-----------+--------------+
| Q-T Interval      | 416                      | ms        | [facility] EKG     |
+-------------------+--------------------------+-----------+--------------+
 
| QTC Calculation   | 517                      | ms        | [facility] EKG     |
| (Bezet)           |                          |           |              |
+-------------------+--------------------------+-----------+--------------+
| Calculated P Axis | 84                       | degrees   | [facility] EKG     |
+-------------------+--------------------------+-----------+--------------+
| Calculated R Axis | -30                      | degrees   | [facility]     |
+-------------------+--------------------------+-----------+--------------+
| Calculated T Axis | 37                       | degrees   | [facility]     |
+-------------------+--------------------------+-----------+--------------+
| Diagnosis         | Normal sinus rhythmLeft  |           | [facility] EKG     |
|                   | axis                     |           |              |
|                   | deviationNon-specific    |           |              |
|                   | intra-ventricular        |           |              |
|                   | conduction blockCannot   |           |              |
|                   | rule out Septal infarct  |           |              |
|                   | , age                    |           |              |
|                   | undeterminedAbnormal     |           |              |
|                   | ECGWhen compared with    |           |              |
|                   | ECG of 2019       |           |              |
|                   | 16:30,QRS duration has   |           |              |
|                   | decreasedMinimal         |           |              |
|                   | criteria for Septal      |           |              |
|                   | infarct are now PresentT |           |              |
|                   |  wave inversion now      |           |              |
|                   | evident in Lateral       |           |              |
|                   | leadsThis ECG contains   |           |              |
|                   | Unconfirmed              |           |              |
|                   | Interpretation           |           |              |
|                   | Statements.    See ED    |           |              |
|                   | Record for Physician     |           |              |
|                   | Interpretation.          |           |              |
|                   | Confirmed by MUSE READ   |           |              |
|                   | ONLY, -COMPUTER ([id number]),   |           |              |
|                   |  Sherman Grissom   |           |              |
|                   | Samm ([id number]) on 2019  |           |              |
|                   | 3:51:40 AM               |           |              |
+-------------------+--------------------------+-----------+--------------+
 
 
 
+--------------+-------------------+--------------------+--------------+
| Performing   | Address           | City/State/Zipcode | Phone Number |
| Organization |                   |                    |              |
+--------------+-------------------+--------------------+--------------+
|   [facility]   |   [address] | [place] [address] |              |
+--------------+-------------------+--------------------+--------------+
 in this encounter
 
 Visit Diagnoses
 
 
+---------------------------------------------------------------------+
| Diagnosis                                                           |
+---------------------------------------------------------------------+
|   Hx of CABG - Primary                                              |
+---------------------------------------------------------------------+
|   Postsurgical aortocoronary bypass status                          |
+---------------------------------------------------------------------+
|   Chest pain, unspecified type                                      |
+---------------------------------------------------------------------+
 
|   S/P MVR (mitral valve repair)                                     |
+---------------------------------------------------------------------+
|   Other postprocedural status                                       |
+---------------------------------------------------------------------+
|   Elevated blood pressure reading                                   |
+---------------------------------------------------------------------+
|   Elevated blood pressure reading without diagnosis of hypertension |
+---------------------------------------------------------------------+
|   Chronic systolic congestive heart failure (HCC)                   |
+---------------------------------------------------------------------+
|   Chronic systolic heart failure                                    |
+---------------------------------------------------------------------+
 
 
 
 Admitting Diagnoses
 
 
+---------------------------------------------------------------------+
| Diagnosis                                                           |
+---------------------------------------------------------------------+
|   Elevated blood pressure reading                                   |
+---------------------------------------------------------------------+
|   Elevated blood pressure reading without diagnosis of hypertension |
+---------------------------------------------------------------------+
|   Chronic systolic congestive heart failure (HCC)                   |
+---------------------------------------------------------------------+
|   Chronic systolic heart failure                                    |
+---------------------------------------------------------------------+
|   S/P MVR (mitral valve repair)                                     |
+---------------------------------------------------------------------+
|   Other postprocedural status                                       |
+---------------------------------------------------------------------+
|   Hx of CABG                                                        |
+---------------------------------------------------------------------+
|   Postsurgical aortocoronary bypass status                          |
+---------------------------------------------------------------------+
|   Chest pain in adult                                               |
+---------------------------------------------------------------------+
|   Chest pain, unspecified type                                      |
+---------------------------------------------------------------------+
 
 
 
 Administered Medications
 
 
+------------------+--------+---------+------+------+------+
| Medication Order | MAR    | Action  | Dose | Rate | Site |
|                  | Action | Date    |      |      |      |
+------------------+--------+---------+------+------+------+
 
 
 
+-----------------------------------+---+
|   acetaminophen (TYLENOL)         |   |
| suppository 650 mg  650 mg,       |   |
| Rectal, Every 6 Hours PRN, Mild   |   |
| Pain (1-3), Fever, Starting Wed   |   |
| 19 at 1351                   |   |
 
+-----------------------------------+---+
|                                   |   |
+-----------------------------------+---+
|   acetaminophen (TYLENOL) tablet  |   |
| 650 mg  650 mg, Oral, Every 6     |   |
| Hours PRN, Mild Pain (1-3),       |   |
| Fever, Starting 19 at    |   |
| 1351                              |   |
+-----------------------------------+---+
|                                   |   |
+-----------------------------------+---+
 
 
 
+-----------------------------------+-------+----------+--------+---+---+
|   apixaban (ELIQUIS) tablet 2.5   | Given |  | 2.5 mg |   |   |
| mg  2.5 mg, Oral, 2 Times Daily,  |       | 9 11:48  |        |   |   |
| First dose on 19 at 1430 |       | PST      |        |   |   |
+-----------------------------------+-------+----------+--------+---+---+
 
 
 
+-------+----------+--------+---+---+
| Given |  | 2.5 mg |   |   |
|       | 9 21:53  |        |   |   |
|       | PST      |        |   |   |
+-------+----------+--------+---+---+
| Given |  | 2.5 mg |   |   |
|       | 9 08:15  |        |   |   |
|       | PST      |        |   |   |
+-------+----------+--------+---+---+
 
 
 
+---+---+
|   |   |
+---+---+
 
 
 
+-----------------------------------+-------+----------+-------+---+---+
|   atorvastatin (LIPITOR) tablet   | Given |  | 40 mg |   |   |
| 40 mg  40 mg, Oral, Nightly,      |       | 9 22:44  |       |   |   |
| First dose on 19 at 2200 |       | PST      |       |   |   |
+-----------------------------------+-------+----------+-------+---+---+
 
 
 
+-------+----------+-------+---+---+
| Given |  | 40 mg |   |   |
|       | 9 21:53  |       |   |   |
|       | PST      |       |   |   |
+-------+----------+-------+---+---+
 
 
 
+---+---+
|   |   |
+---+---+
 
 
 
 
+----------------------------------+-------+----------+--------+---+---+
|   calcium acetate (PHOSLO)       | Given |  | 667 mg |   |   |
| capsule 667 mg  667 mg, Oral, 3  |       | 9 16:18  |        |   |   |
| Times Daily With Meals, First    |       | PST      |        |   |   |
| dose on 19 at 1700      |       |          |        |   |   |
+----------------------------------+-------+----------+--------+---+---+
 
 
 
+-------+----------+--------+---+---+
| Given |  | 667 mg |   |   |
|       | 9 08:15  |        |   |   |
|       | PST      |        |   |   |
+-------+----------+--------+---+---+
| Given |  | 667 mg |   |   |
|       | 9 12:25  |        |   |   |
|       | PST      |        |   |   |
+-------+----------+--------+---+---+
 
 
 
+---+---+
|   |   |
+---+---+
 
 
 
+----------------------------------+-------+----------+----------+---+---+
|   calcium carbonate (TUMS)       | Given |  | 1,000 mg |   |   |
| chewable tablet 1,000 mg  1,000  |       | 9 11:47  |          |   |   |
| mg, Oral, Every 48 Hours, First  |       | PST      |          |   |   |
| dose on u 19 at 0830      |       |          |          |   |   |
+----------------------------------+-------+----------+----------+---+---+
 
 
 
+---+---+
|   |   |
+---+---+
 
 
 
+-----------------------------------+-------+----------+---------+---+---+
|   carvedilol (COREG) tablet 12.5  | Given |  | 12.5 mg |   |   |
| mg  12.5 mg, Oral, 2 Times Daily  |       | 9 16:18  |         |   |   |
| With Meals, First dose on Thu     |       | PST      |         |   |   |
| 19 at 1700                   |       |          |         |   |   |
+-----------------------------------+-------+----------+---------+---+---+
 
 
 
+-------+----------+---------+---+---+
| Given |  | 12.5 mg |   |   |
|       | 9 08:14  |         |   |   |
|       | PST      |         |   |   |
+-------+----------+---------+---+---+
 
 
 
 
+---+---+
|   |   |
+---+---+
 
 
 
+-----------------------------------+-------+----------+----------+---+---+
|   carvedilol (COREG) tablet 3.125 | Given |  | 3.125 mg |   |   |
|  mg  3.125 mg, Oral, 2 Times      |       | 9 16:59  |          |   |   |
| Daily With Meals, First dose on   |       | PST      |          |   |   |
| Wed 19 at 1700               |       |          |          |   |   |
+-----------------------------------+-------+----------+----------+---+---+
 
 
 
+---+---+
|   |   |
+---+---+
 
 
 
+-----------------------------------+-------+----------+-----+-------+---+
|   cefTAZidime (FORTAZ) 2 g in     | Given |  | 2 g | 100   |   |
| sodium chloride (IV) 0.9 % 50 mL  |       | 9 13:47  |     | mL/hr |   |
| IVPB  2 g, Intravenous,           |       | PST      |     |       |   |
| Administer over 30 Minutes, After |       |          |     |       |   |
|  Hemodialysis (see admin          |       |          |     |       |   |
| instructions), Starting Thu       |       |          |     |       |   |
| 19 at 0000, Please send MAR  |       |          |     |       |   |
| message to request dose on        |       |          |     |       |   |
| dialysis days                     |       |          |     |       |   |
+-----------------------------------+-------+----------+-----+-------+---+
 
 
 
+---+---+
|   |   |
+---+---+
 
 
 
+-----------------------------------+-------+----------+--------+---+---+
|   cholecalciferol (VITAMIN D-3)   | Given |  | 1,000  |   |   |
| tablet 1,000 Units  1,000 Units,  |       | 9 15:25  | Units  |   |   |
| Oral, Daily, First dose on Wed    |       | PST      |        |   |   |
| 19 at 1430                   |       |          |        |   |   |
+-----------------------------------+-------+----------+--------+---+---+
 
 
 
+-------+----------+--------+---+---+
| Given |  | 1,000  |   |   |
|       | 9 11:48  | Units  |   |   |
|       | PST      |        |   |   |
+-------+----------+--------+---+---+
| Given |  | 1,000  |   |   |
|       | 9 08:16  | Units  |   |   |
|       | PST      |        |   |   |
+-------+----------+--------+---+---+
 
 
 
 
+---+---+
|   |   |
+---+---+
 
 
 
+-----------------------------------+-------+----------+-------+---+---+
|   clopidogrel (PLAVIX) tablet 75  | Given |  | 75 mg |   |   |
| mg  75 mg, Oral, Daily, First     |       | 9 15:24  |       |   |   |
| dose on Wed 19 at 1430       |       | PST      |       |   |   |
+-----------------------------------+-------+----------+-------+---+---+
 
 
 
+-------+----------+-------+---+---+
| Given |  | 75 mg |   |   |
|       | 9 11:47  |       |   |   |
|       | PST      |       |   |   |
+-------+----------+-------+---+---+
| Given |  | 75 mg |   |   |
|       | 9 08:16  |       |   |   |
|       | PST      |       |   |   |
+-------+----------+-------+---+---+
 
 
 
+---+---+
|   |   |
+---+---+
 
 
 
+-----------------------------------+-------+----------+-------+---+---+
|   diphenhydrAMINE (BENADRYL)      | Given |  | 25 mg |   |   |
| capsule 25 mg  25 mg, Oral, Every |       | 9 10:58  |       |   |   |
|  6 Hours PRN, Itching, Starting   |       | PST      |       |   |   |
| Fri 19 at 1020               |       |          |       |   |   |
+-----------------------------------+-------+----------+-------+---+---+
 
 
 
+---+---+
|   |   |
+---+---+
 
 
 
+-----------------------------------+-------+----------+-------+---+---+
|   furosemide (LASIX) tablet 20 mg | Given |  | 20 mg |   |   |
|   20 mg, Oral, Daily, First dose  |       | 9 15:25  |       |   |   |
| on Wed 19 at 1430            |       | PST      |       |   |   |
+-----------------------------------+-------+----------+-------+---+---+
 
 
 
+---+---+
|   |   |
 
+---+---+
 
 
 
+-----------------------------------+-------+----------+----------+---+---+
|   HYDROcodone-acetaminophen       | Given |  | 1 tablet |   |   |
| (NORCO) 5-325 MG per tablet 1     |       | 9 17:33  |          |   |   |
| tablet  1 tablet, Oral, Every 4   |       | PST      |          |   |   |
| Hours PRN, Moderate Pain (4-6),   |       |          |          |   |   |
| Severe Pain (7-10), Starting Wed  |       |          |          |   |   |
| 19 at 1350                   |       |          |          |   |   |
+-----------------------------------+-------+----------+----------+---+---+
 
 
 
+-------+----------+----------+---+---+
| Given |  | 1 tablet |   |   |
|       | 9 21:53  |          |   |   |
|       | PST      |          |   |   |
+-------+----------+----------+---+---+
| Given |  | 1 tablet |   |   |
|       | 9 05:42  |          |   |   |
|       | PST      |          |   |   |
+-------+----------+----------+---+---+
 
 
 
+---+---+
|   |   |
+---+---+
 
 
 
+-----------------------------------+-------+----------+---------+---+---+
|   HYDROcodone-acetaminophen       | Given |  | 2       |   |   |
| (NORCO) 5-325 MG per tablet 2     |       | 9 11:32  | tablets |   |   |
| tablet  2 tablet, Oral, Once, Wed |       | PST      |         |   |   |
|  19 at 1130, For 1 dose      |       |          |         |   |   |
+-----------------------------------+-------+----------+---------+---+---+
 
 
 
+---+---+
|   |   |
+---+---+
 
 
 
+-----------------------------------+-------+----------+----------+---+---+
|   insulin detemir (LEVEMIR)       | Given |  | 10 Units |   |   |
| injection 10 Units  10 Units,     |       | 9 22:43  |          |   |   |
| Subcutaneous, Nightly, First dose |       | PST      |          |   |   |
|  on 19 at 2200           |       |          |          |   |   |
+-----------------------------------+-------+----------+----------+---+---+
 
 
 
+-------+----------+----------+---+---+
| Given |  | 10 Units |   |   |
|       | 9 21:54  |          |   |   |
 
|       | PST      |          |   |   |
+-------+----------+----------+---+---+
 
 
 
+---+---+
|   |   |
+---+---+
 
 
 
+-----------------------------------+-------+----------+---------+---+---+
|   insulin lispro (human)          | Given |  | 1 Units |   |   |
| (HUMALOG) injection 0-3 Units     |       | 9 22:43  |         |   |   |
| 0-3 Units, Subcutaneous, Nightly, |       | PST      |         |   |   |
|  First dose on 19 at     |       |          |         |   |   |
| 2200                              |       |          |         |   |   |
+-----------------------------------+-------+----------+---------+---+---+
 
 
 
+---+---+
|   |   |
+---+---+
 
 
 
+-----------------------------------+-------+----------+---------+---+---+
|   insulin lispro (human)          | Given |  | 1 Units |   |   |
| (HUMALOG) injection 0-6 Units     |       | 9 05:38  |         |   |   |
| 0-6 Units, Subcutaneous, 3 Times  |       | PST      |         |   |   |
| Daily Before Meals, First dose on |       |          |         |   |   |
|  Wed 19 at 1630              |       |          |         |   |   |
+-----------------------------------+-------+----------+---------+---+---+
 
 
 
+-------+----------+---------+---+---+
| Given |  | 2 Units |   |   |
|       | 9 12:15  |         |   |   |
|       | PST      |         |   |   |
+-------+----------+---------+---+---+
 
 
 
+---+---+
|   |   |
+---+---+
 
 
 
+-----------------------------------+-------+----------+-------+---+---+
|   isosorbide mononitrate (IMDUR)  | Given |  | 60 mg |   |   |
| 24 hr tablet 60 mg  60 mg, Oral,  |       | 9 15:24  |       |   |   |
| Daily, First dose on 19  |       | PST      |       |   |   |
| at 1430                           |       |          |       |   |   |
+-----------------------------------+-------+----------+-------+---+---+
 
 
 
 
+-------+----------+-------+---+---+
| Given |  | 60 mg |   |   |
|       | 9 07:34  |       |   |   |
|       | PST      |       |   |   |
+-------+----------+-------+---+---+
| Given |  | 60 mg |   |   |
|       | 9 08:15  |       |   |   |
|       | PST      |       |   |   |
+-------+----------+-------+---+---+
 
 
 
+---+---+
|   |   |
+---+---+
 
 
 
+-----------------------------------+-------+----------+------+---+---+
|   LORazepam (ATIVAN) tablet 1 mg  | Given |  | 1 mg |   |   |
|  1 mg, Oral, Daily PRN, Anxiety,  |       | 9 22:51  |      |   |   |
| Starting 19 at 1350      |       | PST      |      |   |   |
+-----------------------------------+-------+----------+------+---+---+
 
 
 
+-------+----------+------+---+---+
| Given |  | 1 mg |   |   |
|       | 9 07:31  |      |   |   |
|       | PST      |      |   |   |
+-------+----------+------+---+---+
 
 
 
+---+---+
|   |   |
+---+---+
 
 
 
+-----------------------------------+-------+----------+------+---+---+
|   LORazepam (ATIVAN) tablet 1 mg  | Given |  | 1 mg |   |   |
|  1 mg, Oral, Once, Wed 19 at |       | 9 13:23  |      |   |   |
|  1323, For 1 dose                 |       | PST      |      |   |   |
+-----------------------------------+-------+----------+------+---+---+
 
 
 
+---+---+
|   |   |
+---+---+
 
 
 
+-----------------------------------+-------+----------+------+---+---+
|   LORazepam (ATIVAN) tablet 1 mg  | Given |  | 1 mg |   |   |
|  1 mg, Oral, Every 6 Hours PRN,   |       | 9 16:34  |      |   |   |
| Anxiety, Starting Thu 19 at  |       | PST      |      |   |   |
| 1200                              |       |          |      |   |   |
+-----------------------------------+-------+----------+------+---+---+
 
 
 
 
+-------+----------+------+---+---+
| Given |  | 1 mg |   |   |
|       | 9 13:15  |      |   |   |
|       | PST      |      |   |   |
+-------+----------+------+---+---+
 
 
 
+---+---+
|   |   |
+---+---+
 
 
 
+-----------------------------------+-------+----------+-------+---+---+
|   losartan (COZAAR) tablet 25 mg  | Given |  | 25 mg |   |   |
|  25 mg, Oral, Daily, First dose   |       | 9 15:24  |       |   |   |
| on Wed 19 at 1430            |       | PST      |       |   |   |
+-----------------------------------+-------+----------+-------+---+---+
 
 
 
+-------+----------+-------+---+---+
| Given |  | 25 mg |   |   |
|       | 9 11:48  |       |   |   |
|       | PST      |       |   |   |
+-------+----------+-------+---+---+
| Given |  | 25 mg |   |   |
|       | 9 08:15  |       |   |   |
|       | PST      |       |   |   |
+-------+----------+-------+---+---+
 
 
 
+---+---+
|   |   |
+---+---+
 
 
 
+-----------------------------------+-------+----------+-------+---+---+
|   magnesium chloride (MAG64) EC   | Given |  | 64 mg |   |   |
| tablet 64 mg  64 mg (1 tablet),   |       | 9 11:47  |       |   |   |
| Oral, Every 48 Hours, First dose  |       | PST      |       |   |   |
| on Thu 19 at 0830            |       |          |       |   |   |
+-----------------------------------+-------+----------+-------+---+---+
 
 
 
+---+---+
|   |   |
+---+---+
 
 
 
+-----------------------------------+-------+----------+--------+---+---+
|   nitroGLYCERIN (NITRO-BID) 2 %   | Given |  | 1 inch |   |   |
 
| ointment 1 inch  1 inch, Topical, |       | 9 16:59  |        |   |   |
|  Every 6 Hours, First dose on Wed |       | PST      |        |   |   |
|  19 at 1107, For 4 doses     |       |          |        |   |   |
+-----------------------------------+-------+----------+--------+---+---+
 
 
 
+-------+----------+--------+---+---+
| Given |  | 1 inch |   |   |
|       | 9 22:51  |        |   |   |
|       | PST      |        |   |   |
+-------+----------+--------+---+---+
| Given |  | 1 inch |   |   |
|       | 9 05:39  |        |   |   |
|       | PST      |        |   |   |
+-------+----------+--------+---+---+
 
 
 
+---+---+
|   |   |
+---+---+
 
 
 
+-----------------------------------+-------+----------+--------+---+---+
|   nitroGLYCERIN (NITROSTAT) SL    | Given |  | 0.4 mg |   |   |
| tablet 0.4 mg  0.4 mg,            |       | 9 09:59  |        |   |   |
| Sublingual, Every 5 Min PRN,      |       | PST      |        |   |   |
| Chest pain, Starting Wed 19  |       |          |        |   |   |
| at 0954, For 3 doses              |       |          |        |   |   |
+-----------------------------------+-------+----------+--------+---+---+
 
 
 
+-------+----------+--------+---+---+
| Given |  | 0.4 mg |   |   |
|       | 9 11:05  |        |   |   |
|       | PST      |        |   |   |
+-------+----------+--------+---+---+
| Given |  | 0.4 mg |   |   |
|       | 9 11:12  |        |   |   |
|       | PST      |        |   |   |
+-------+----------+--------+---+---+
 
 
 
+---+---+
|   |   |
+---+---+
 
 
 
+-----------------------------------+-------+----------+--------+---+---+
|   nitroGLYCERIN (NITROSTAT) SL    | Given |  | 0.4 mg |   |   |
| tablet 0.4 mg  0.4 mg,            |       | 9 13:27  |        |   |   |
| Sublingual, Every 5 Min PRN,      |       | PST      |        |   |   |
| Chest pain, Starting Wed 19  |       |          |        |   |   |
| at 1327, For 3 doses              |       |          |        |   |   |
+-----------------------------------+-------+----------+--------+---+---+
 
 
 
 
+-------+----------+--------+---+---+
| Given |  | 0.4 mg |   |   |
|       | 9 14:48  |        |   |   |
|       | PST      |        |   |   |
+-------+----------+--------+---+---+
| Given |  | 0.4 mg |   |   |
|       | 9 14:54  |        |   |   |
|       | PST      |        |   |   |
+-------+----------+--------+---+---+
 
 
 
+---+---+
|   |   |
+---+---+
 
 
 
+-----------------------------------+-------+----------+--------+---+---+
|   nitroGLYCERIN (NITROSTAT) SL    | Given |  | 0.4 mg |   |   |
| tablet 0.4 mg  0.4 mg,            |       | 9 13:55  |        |   |   |
| Sublingual, Every 5 Min PRN,      |       | PST      |        |   |   |
| Chest pain, Starting Thu 19  |       |          |        |   |   |
| at 1018                           |       |          |        |   |   |
+-----------------------------------+-------+----------+--------+---+---+
 
 
 
+-------+----------+--------+---+---+
| Given |  | 0.4 mg |   |   |
|       | 9 16:34  |        |   |   |
|       | PST      |        |   |   |
+-------+----------+--------+---+---+
| Given |  | 0.4 mg |   |   |
|       | 9 05:39  |        |   |   |
|       | PST      |        |   |   |
+-------+----------+--------+---+---+
 
 
 
+---+---+
|   |   |
+---+---+
 
 
 
+-----------------------------------+-------+----------+-------+---+---+
|   nortriptyline (PAMELOR) capsule | Given |  | 25 mg |   |   |
|  25 mg  25 mg, Oral, Every        |       | 9 08:14  |       |   |   |
| Morning, First dose on Fri        |       | PST      |       |   |   |
| 19 at 0900                   |       |          |       |   |   |
+-----------------------------------+-------+----------+-------+---+---+
 
 
 
+---+---+
|   |   |
 
+---+---+
 
 
 
+-----------------------------------+-------+----------+-------+---+---+
|   nortriptyline (PAMELOR) capsule | Given |  | 75 mg |   |   |
|  75 mg  75 mg, Oral, Nightly,     |       | 9 21:53  |       |   |   |
| First dose on Thu 19 at 2200 |       | PST      |       |   |   |
+-----------------------------------+-------+----------+-------+---+---+
 
 
 
+-----------------------------------+---+
|                                   |   |
+-----------------------------------+---+
|   ondansetron (ZOFRAN) injection  |   |
| 4 mg  4 mg, Intravenous, Every 6  |   |
| Hours PRN, Nausea, Vomiting,      |   |
| Starting Wed 19 at 1351      |   |
+-----------------------------------+---+
|                                   |   |
+-----------------------------------+---+
 
 
 
+-----------------------------------+-------+----------+------+---+---+
|   ondansetron (ZOFRAN-ODT)        | Given |  | 4 mg |   |   |
| disintegrating tablet 4 mg  4 mg, |       | 9 10:21  |      |   |   |
|  Oral, Every 6 Hours PRN, Nausea, |       | PST      |      |   |   |
|  Vomiting, Starting Wed 19   |       |          |      |   |   |
| at 1351                           |       |          |      |   |   |
+-----------------------------------+-------+----------+------+---+---+
 
 
 
+---+---+
|   |   |
+---+---+
 
 
 
+-----------------------------------+-------+----------+--------+---+---+
|   oxybutynin (DITROPAN) tablet    | Given |  | 2.5 mg |   |   |
| 2.5 mg  2.5 mg, Oral, 2 Times     |       | 9 13:46  |        |   |   |
| Daily, First dose on 19  |       | PST      |        |   |   |
| at 1430                           |       |          |        |   |   |
+-----------------------------------+-------+----------+--------+---+---+
 
 
 
+-------+----------+--------+---+---+
| Given |  | 2.5 mg |   |   |
|       | 9 21:54  |        |   |   |
|       | PST      |        |   |   |
+-------+----------+--------+---+---+
| Given |  | 2.5 mg |   |   |
|       | 9 08:14  |        |   |   |
|       | PST      |        |   |   |
+-------+----------+--------+---+---+
 
 
 
 
+---+---+
|   |   |
+---+---+
 
 
 
+-----------------------------------+-------+----------+-------+---+---+
|   pantoprazole (PROTONIX) EC      | Given |  | 40 mg |   |   |
| tablet 40 mg  40 mg, Oral, Every  |       | 9 05:39  |       |   |   |
| Morning Before Breakfast, First   |       | PST      |       |   |   |
| dose on Thu 19 at 0630       |       |          |       |   |   |
+-----------------------------------+-------+----------+-------+---+---+
 
 
 
+-------+----------+-------+---+---+
| Given |  | 40 mg |   |   |
|       | 9 05:42  |       |   |   |
|       | PST      |       |   |   |
+-------+----------+-------+---+---+
 
 
 
+---+---+
|   |   |
+---+---+
 
 
 
+-----------------------------------+-------+----------+---------+---+---+
|   rOPINIRole (REQUIP) tablet 0.75 | Given |  | 0.75 mg |   |   |
|  mg  0.75 mg, Oral, Nightly,      |       | 9 22:43  |         |   |   |
| First dose on Wed 19 at 2200 |       | PST      |         |   |   |
+-----------------------------------+-------+----------+---------+---+---+
 
 
 
+-------+----------+---------+---+---+
| Given |  | 0.75 mg |   |   |
|       | 9 21:53  |         |   |   |
|       | PST      |         |   |   |
+-------+----------+---------+---+---+
 
 
 
+---+---+
|   |   |
+---+---+
 
 
 
+-----------------------------------+-------+----------+--------+-------+---+
|   vancomycin (VANCOCIN) 500 mg in | Given |  | 500 mg | 100   |   |
|  sodium chloride (IV) 0.9 % 100   |       | 9 11:02  |        | mL/hr |   |
| mL IVPB  500 mg, Intravenous,     |       | PST      |        |       |   |
| Administer over 60 Minutes, Once, |       |          |        |       |   |
|  Thu 19 at 1100, For 1 dose, |       |          |        |       |   |
|  After dialysis on HD days        |       |          |        |       |   |
 
+-----------------------------------+-------+----------+--------+-------+---+
 
 
 
+---+---+
|   |   |
+---+---+
 in this encounter

## 2019-04-09 NOTE — XMS
Encounter Summary
  Created on: 2019
 
 Cherie Villalobos
 External Reference #: [id number]
 : 48
 Sex: Female
 
 Demographics
 
 
+-----------------------+--------------------------+
| Address               | [address]               |
|                       | [place]  [address] |
+-----------------------+--------------------------+
| Home Phone            | [phone]          |
+-----------------------+--------------------------+
| Preferred Language    | Unknown                  |
+-----------------------+--------------------------+
| Marital Status        |                   |
+-----------------------+--------------------------+
| Tenriism Affiliation | 1013                     |
+-----------------------+--------------------------+
| Race                  | Unknown                  |
+-----------------------+--------------------------+
| Ethnic Group          | Unknown                  |
+-----------------------+--------------------------+
 
 
 Author
 
 
+--------------+--------------------------------------------+
| Author       | [facility]  |
|              | [facility] [place]                                |
+--------------+--------------------------------------------+
| Organization | [facility]  |
|              | [facility] [place]                                |
+--------------+--------------------------------------------+
| Address      | Unknown                                    |
+--------------+--------------------------------------------+
| Phone        | Unavailable                                |
+--------------+--------------------------------------------+
 
 
 
 Support
 
 
+---------------+--------------+---------------------+-----------------+
| Name          | Relationship | Address             | Phone           |
+---------------+--------------+---------------------+-----------------+
| Anoop Villalobos | ECON         | [address] [place], | [phone] |
|               |              |  [place]  [address]     |                 |
+---------------+--------------+---------------------+-----------------+
| Jennifer Dickson | ECON         | [place]       | [phone] |
|               |              | [phone]               |                 |
+---------------+--------------+---------------------+-----------------+
 
 
 
 
 Care Team Providers
 
 
+-----------------------+------+-------------+
| Care Team Member Name | Role | Phone       |
+-----------------------+------+-------------+
 PCP  | Unavailable |
+-----------------------+------+-------------+
 
 
 
 Reason for Visit
 
 
+---------------+----------+
| Reason        | Comments |
+---------------+----------+
| Device Check  | Billed   |
| (Remote)      |          |
+---------------+----------+
 
 
 
 Encounter Details
 
 
+--------+----------+----------------------+----------------------+----------------------+
| Date   | Type     | Department           | Care Team            | Description          |
+--------+----------+----------------------+----------------------+----------------------+
| / | Implant  |   [facility]          |   Johnie John, | Remote Device        |
| 2019   | Monitor  | CARDIOLOGY  [address]    |  MD  [address] [place] | Interrogation        |
|        |          | [place]  [place], |  [address].  [place],   | (Primary Dx); ICD    |
|        |          |  [place] [address]       | [place] [address]             | (implantable         |
|        |          | [phone]         | [phone]         | cardioverter-defibri |
|        |          |                      | [phone] (Fax)   | llator) [place]       |
|        |          |                      |                      | Scientific  18   |
|        |          |                      |                      | Dora;           |
|        |          |                      |                      | Cardiomyopathy,      |
|        |          |                      |                      | unspecified type     |
|        |          |                      |                      | (HCC)                |
+--------+----------+----------------------+----------------------+----------------------+
 
 
 
 Social History
 
 
+---------------+------------+-----------+--------+------------------+
| Tobacco Use   | Types      | Packs/Day | Years  | Date             |
|               |            |           | Used   |                  |
+---------------+------------+-----------+--------+------------------+
| Former Smoker | Cigarettes | 1         | 30     | Quit: 2004 |
+---------------+------------+-----------+--------+------------------+
 
 
 
+---------------------+---+---+---+
 
| Smokeless Tobacco:  |   |   |   |
| Never Used          |   |   |   |
+---------------------+---+---+---+
 
 
 
+-------------+-----------+---------+----------+
| Alcohol Use | Drinks/We | oz/Week | Comments |
|             | ek        |         |          |
+-------------+-----------+---------+----------+
| No          |           |         |          |
+-------------+-----------+---------+----------+
 
 
 
+------------------+---------------+
| Sex Assigned at  | Date Recorded |
| Birth            |               |
+------------------+---------------+
| Not on file      |               |
+------------------+---------------+
 
 
 
+----------------+-------------+-------------+
| Job Start Date | Occupation  | Industry    |
+----------------+-------------+-------------+
| Not on file    | Not on file | Not on file |
+----------------+-------------+-------------+
 
 
 
+----------------+--------------+------------+
| Travel History | Travel Start | Travel End |
+----------------+--------------+------------+
 
 
 
+-------------------------------------+
| No recent travel history available. |
+-------------------------------------+
 documented as of this encounter
 
 Functional Status
 
 
+---------------------------------------------+----------+--------------------+
| Functional Status                           | Response | Date of Assessment |
+---------------------------------------------+----------+--------------------+
| Are you deaf or do you have serious         | No       | 2018         |
| difficulty hearing?                         |          |                    |
+---------------------------------------------+----------+--------------------+
| Are you blind or do you have serious        | No       | 2018         |
| difficulty seeing, even when wearing        |          |                    |
| glasses?                                    |          |                    |
+---------------------------------------------+----------+--------------------+
| Do you have serious difficulty walking or   | No       | 2018         |
| climbing stairs? (5 years old or older)     |          |                    |
+---------------------------------------------+----------+--------------------+
| Do you have difficulty dressing or bathing? | No       | 2018         |
 
|  (5 years old or older)                     |          |                    |
+---------------------------------------------+----------+--------------------+
| Because of a physical, mental, or emotional | No       | 2018         |
|  condition, do you have difficulty doing    |          |                    |
| errands alone such as visiting a doctor's   |          |                    |
| office or shopping? [15 years old or        |          |                    |
| older)]                                     |          |                    |
+---------------------------------------------+----------+--------------------+
 
 
 
+---------------------------------------------+----------+--------------------+
| Cognitive Status                            | Response | Date of Assessment |
+---------------------------------------------+----------+--------------------+
| Because of a physical, mental, or emotional | No       | 2018         |
|  condition, do you have serious difficulty  |          |                    |
| concentrating, remembering, or making       |          |                    |
| decisions? (5 years old or older)           |          |                    |
+---------------------------------------------+----------+--------------------+
 documented as of this encounter
 
 Plan of Treatment
 
 
+--------+---------+------------+----------------------+-------------+
| Date   | Type    | Specialty  | Care Team            | Description |
+--------+---------+------------+----------------------+-------------+
| / | Office  | Cardiology |   Johnie John, |             |
|    | Visit   |            |  MD  [address] |             |
|        |         |            |  [place]. [place],   |             |
|        |         |            | [place] [address]             |             |
|        |         |            | [phone]         |             |
|        |         |            | [phone] (Fax)   |             |
+--------+---------+------------+----------------------+-------------+
 documented as of this encounter
 
 Procedures
 
 
+-----------------+--------+-------------+----------------------+----------------------+
| Procedure Name  | Priori | Date/Time   | Associated Diagnosis | Comments             |
|                 | ty     |             |                      |                      |
+-----------------+--------+-------------+----------------------+----------------------+
| DEVICE          | Routin | 2019  |   Remote Device      |   Results for this   |
| INTERROGATION-  | e      | 23:59 PDT   | Interrogation   ICD  | procedure are in the |
| REMOTE          |        |             | (implantable         |  results section.    |
|                 |        |             | cardioverter-defibri |                      |
|                 |        |             | llator) [employer]       |                      |
|                 |        |             | Scientific  18   |                      |
|                 |        |             | Dora            |                      |
|                 |        |             | Cardiomyopathy,      |                      |
|                 |        |             | unspecified type     |                      |
|                 |        |             | ([facility])                |                      |
+-----------------+--------+-------------+----------------------+----------------------+
 documented in this encounter
 
 Results
 Device Interrogation - Remote (2019 23:59 PDT)
 
+-----------------------------------------------------------------------+--------------+
 
| Narrative                                                             | Performed At |
+-----------------------------------------------------------------------+--------------+
|   This result has an attachment that is not available.  Johnie        |   [facility]    |
| MD [place]         2019 15:05Date of Remote Interrogation:    |              |
| 19 Refer to [employer] documentation and remote PDF scanned into    |              |
| EPIC for remote interrogation results.    Data collected by Clementina SALAZAR     |              |
| NAHNU Maynard Presenting rhythm:    sinus rhythm 59-60 beats.0           |              |
| ventricular high rate episodes. No tachycardia therapies recommended  |              |
| or delivered.Histogram    good.     Battery longevity                 |              |
| 12    years.Apparent normal and stable device function.Device         |              |
| interrogation due in office in 2019.                        |              |
|recommended or delivered.                                              |              |
|Histogram    good.     Battery longevity 12    years.                 |              |
|Apparent normal and stable device function.                            |              |
|Device interrogation due in office in 2019.                  |              |
|                                                                       |              |
+-----------------------------------------------------------------------+--------------+
 
 
 
+--------------+---------+--------------------+--------------+
| Performing   | Address | City/State/Zipcode | Phone Number |
| Organization |         |                    |              |
+--------------+---------+--------------------+--------------+
|   [facility]    |         |                    |              |
+--------------+---------+--------------------+--------------+
 documented in this encounter
 
 Visit Diagnoses
 
 
+------------------------------------------------------------------------------------+
| Diagnosis                                                                          |
+------------------------------------------------------------------------------------+
|   Remote Device Interrogation  - Primary  Fitting and adjustment of automatic      |
| implantable cardiac defibrillator                                                  |
+------------------------------------------------------------------------------------+
|   ICD (implantable cardioverter-defibrillator) [employer]  18 [address] |
+------------------------------------------------------------------------------------+
|   Cardiomyopathy, unspecified type (HCC)                                           |
+------------------------------------------------------------------------------------+
 documented in this encounter

## 2019-04-09 NOTE — XMS
Encounter Summary
  Created on: 2019
 
 Cherie Villalobos
 External Reference #: [id number]
 : 48
 Sex: Female
 
 Demographics
 
 
+-----------------------+--------------------------+
| Address               | [address]               |
|                       | [place]  [address] |
+-----------------------+--------------------------+
| Home Phone            | [phone]          |
+-----------------------+--------------------------+
| Preferred Language    | Unknown                  |
+-----------------------+--------------------------+
| Marital Status        |                   |
+-----------------------+--------------------------+
| Synagogue Affiliation | 1013                     |
+-----------------------+--------------------------+
| Race                  | Unknown                  |
+-----------------------+--------------------------+
| Ethnic Group          | Unknown                  |
+-----------------------+--------------------------+
 
 
 Author
 
 
+--------------+-----------------------+
| Author       | [email or web address] |
+--------------+-----------------------+
| Organization | [facility] |
+--------------+-----------------------+
| Address      | Unknown               |
+--------------+-----------------------+
| Phone        | Unavailable           |
+--------------+-----------------------+
 
 
 
 Support
 
 
+---------------+--------------+---------------------+-----------------+
| Name          | Relationship | Address             | Phone           |
+---------------+--------------+---------------------+-----------------+
| Anoop Villalobos | ECON         | [place], | [phone] |
|               |              |  [place]  [address]     |                 |
+---------------+--------------+---------------------+-----------------+
| Jennifer Dickson | ECON         | [place]       | [phone] |
|               |              | [address]               |                 |
+---------------+--------------+---------------------+-----------------+
 
 
 
 
 Care Team Providers
 
 
+-----------------------+------+-----------------+
| Care Team Member Name | Role | Phone           |
+-----------------------+------+-----------------+
| Ivy Couch DO      | PCP  | [phone] |
+-----------------------+------+-----------------+
 
 
 
 Reason for Visit
 
 
+-----------+-------------+
| Reason    | Comments    |
+-----------+-------------+
| Follow-up | appointment |
+-----------+-------------+
 
 
 
 Encounter Details
 
 
+--------+-----------+----------------------+----------------------+---------------+
| Date   | Type      | Department           | Care Team            | Description   |
+--------+-----------+----------------------+----------------------+---------------+
| / | Telephone |   [facility] |   Srinivasan Jordan,  | Follow-up     |
| 2019   |           |  and Ankle  [address]      | DPM  [address], | (appointment) |
|        |           | [address]   |  [address]  [place],  |               |
|        |           | [place], [place]         | [place] [address]             |               |
|        |           | [address]           | [phone]         |               |
|        |           | [phone]         | [phone] (Fax)   |               |
+--------+-----------+----------------------+----------------------+---------------+
 
 
 
 Social History
 
 
+---------------+------------+-----------+--------+------------------+
| Tobacco Use   | Types      | Packs/Day | Years  | Date             |
|               |            |           | Used   |                  |
+---------------+------------+-----------+--------+------------------+
| Former Smoker | Cigarettes | 1         | 30     | Quit: 2004 |
+---------------+------------+-----------+--------+------------------+
 
 
 
+---------------------+---+---+---+
| Smokeless Tobacco:  |   |   |   |
| Never Used          |   |   |   |
+---------------------+---+---+---+
 
 
 
+------------------------------+
| Comments: quite smoking 2004 |
 
+------------------------------+
 
 
 
+-------------+-----------+---------+----------+
| Alcohol Use | Drinks/We | oz/Week | Comments |
|             | ek        |         |          |
+-------------+-----------+---------+----------+
| No          |           |         |          |
+-------------+-----------+---------+----------+
 
 
 
+------------------+---------------+
| Sex Assigned at  | Date Recorded |
| Birth            |               |
+------------------+---------------+
| Not on file      |               |
+------------------+---------------+
 as of this encounter
 
 Plan of Treatment
 
 
+--------+---------+-----------+----------------------+-------------+
| Date   | Type    | Specialty | Care Team            | Description |
+--------+---------+-----------+----------------------+-------------+
| 04/10/ | Office  | Podiatry  |   Srinivasan Jordan,  |             |
|    | Visit   |           | [facility]  [address], |             |
|        |         |           |  [address]  [place],  |             |
|        |         |           | [place] [address]             |             |
|        |         |           | [phone]         |             |
|        |         |           | [phone] (Fax)   |             |
+--------+---------+-----------+----------------------+-------------+
 as of this encounter
 
 Visit Diagnoses
 Not on filein this encounter

## 2019-04-09 NOTE — XMS
Clinical Summary
  Created on: 2019
 
 Cherie Jo
 External Reference #: [id number]
 : 48
 Sex: Female
 
 Demographics
 
 
+-----------------------+--------------------------+
| Address               | [address]               |
|                       | [place]  [address] |
+-----------------------+--------------------------+
| Home Phone            | [phone]          |
+-----------------------+--------------------------+
| Preferred Language    | Unknown                  |
+-----------------------+--------------------------+
| Marital Status        |                   |
+-----------------------+--------------------------+
| Scientology Affiliation | [id number]                     |
+-----------------------+--------------------------+
| Race                  | Unknown                  |
+-----------------------+--------------------------+
| Ethnic Group          | Unknown                  |
+-----------------------+--------------------------+
 
 
 Author
 
 
+--------------+-----------------------+
| Author       | [facility] |
+--------------+-----------------------+
| Organization | [facility] |
+--------------+-----------------------+
| Address      | Unknown               |
+--------------+-----------------------+
| Phone        | Unavailable           |
+--------------+-----------------------+
 
 
 
 Support
 
 
+---------------+--------------+---------------------+-----------------+
| Name          | Relationship | Address             | Phone           |
+---------------+--------------+---------------------+-----------------+
| Anoop Jo | ECON         | [place], | [phone] |
|               |              |  [place]  [address]     |                 |
+---------------+--------------+---------------------+-----------------+
| Jennifer Dickson | ECON         | [place]       | [phone] |
|               |              | [phone]               |                 |
+---------------+--------------+---------------------+-----------------+
 
 
 
 
 Care Team Providers
 
 
+-----------------------+------+-----------------+
| Care Team Member Name | Role | Phone           |
+-----------------------+------+-----------------+
| Ivy Couch DO      | PP   | [phone] |
+-----------------------+------+-----------------+
 
 
 
 Allergies
 
 
+---------------------+----------------------+----------+----------+----------------------+
| Active Allergy      | Reactions            | Severity | Noted    | Comments             |
|                     |                      |          | Date     |                      |
+---------------------+----------------------+----------+----------+----------------------+
| Digitoxin           | Other (See Comments) | High     | 20 |   Toxic, patient     |
|                     |                      |          | 18       | states her eyes were |
|                     |                      |          |          |  also affected "she  |
|                     |                      |          |          | seen yellow          |
|                     |                      |          |          | everywhere"          |
+---------------------+----------------------+----------+----------+----------------------+
| Digoxin And Related | Other (See Comments) | Medium   | 20 |   toxic              |
|                     |                      |          | 18       |                      |
+---------------------+----------------------+----------+----------+----------------------+
| Metaxalone          | Other (See Comments) | Medium   |          |                      |
+---------------------+----------------------+----------+----------+----------------------+
| Morphine            | Mental Changes,      | High     | 20 |   "makes me feel     |
|                     | Other (See           |          | 12       | jittery"  Other      |
|                     | Comments), Anxiety   |          |          | reaction(s): MAKES   |
|                     |                      |          |          | HER "CRAZY"          |
|                     |                      |          |          | Hallucinations  Make |
|                     |                      |          |          |  her crazy/ "funny   |
|                     |                      |          |          | feeling in my head"  |
|                     |                      |          |          |  Has used            |
|                     |                      |          |          | hydromorphone        |
|                     |                      |          |          | in-house             |
+---------------------+----------------------+----------+----------+----------------------+
| Pantoprazole Sodium | Nausea and Vomiting  | Medium   | 20 |                      |
|                     |                      |          | 18       |                      |
+---------------------+----------------------+----------+----------+----------------------+
| Penicillin G        | Hives                | High     | 20 |                      |
|                     |                      |          | 10       |                      |
+---------------------+----------------------+----------+----------+----------------------+
| Penicillins         | Rash, Hives          | High     | 20 |   Other reaction(s): |
|                     |                      |          | 11       |  RASH  Tolerates     |
|                     |                      |          |          | cephalosporins       |
+---------------------+----------------------+----------+----------+----------------------+
| Quinapril Hcl       | Edema                | Medium   | 20 |   Facial Edema       |
|                     |                      |          | 13       |                      |
+---------------------+----------------------+----------+----------+----------------------+
| Quinapril Hcl       | Anaphylaxis          | High     | 20 |                      |
|                     |                      |          | 18       |                      |
+---------------------+----------------------+----------+----------+----------------------+
| Quinapril Hcl       | Angioedema           | High     | 20 |   Facial Edema       |
|                     |                      |          | 13       |                      |
+---------------------+----------------------+----------+----------+----------------------+
 
| Pseudoephedrine Hcl | Rash                 | Medium   | 20 |                      |
|                     |                      |          | 11       |                      |
+---------------------+----------------------+----------+----------+----------------------+
 
 
 
 Current Medications
 
 
+----------------------+----------------------+--------+---------+------+------+-------+
| Prescription         | Sig.                 | Disp.  | Refills | Star | End  | Statu |
|                      |                      |        |         | t    | Date | s     |
|                      |                      |        |         | Date |      |       |
+----------------------+----------------------+--------+---------+------+------+-------+
|   rOPINIRole         | Take 0.75 mg by      |        |         |      |      | Activ |
| (REQUIP) 0.25 MG     | mouth nightly.       |        |         |      |      | e     |
| tablet               |                      |        |         |      |      |       |
+----------------------+----------------------+--------+---------+------+------+-------+
|   nortriptyline      | Take 25-75 mg by     |        |         |      |      | Activ |
| (PAMELOR) 25 MG      | mouth See Admin      |        |         |      |      | e     |
| capsule              | Instructions. Takes  |        |         |      |      |       |
|                      | 25 mg by mouth every |        |         |      |      |       |
|                      |  morning and 75 mg   |        |         |      |      |       |
|                      | every night          |        |         |      |      |       |
+----------------------+----------------------+--------+---------+------+------+-------+
|   oxybutynin         | Take 7.5 mg by mouth |        |         |      |      | Activ |
| (DITROPAN) 5 MG      |  2 (two) times       |        |         |      |      | e     |
| tablet               | daily.               |        |         |      |      |       |
+----------------------+----------------------+--------+---------+------+------+-------+
|   albuterol          | Inhale 2 puffs into  |        |         |      |      | Activ |
| (PROVENTIL           | the lungs every 4    |        |         |      |      | e     |
| HFA;VENTOLIN HFA)    | (four) hours as      |        |         |      |      |       |
| 108 (90 Base)        | needed for Wheezing. |        |         |      |      |       |
| MCG/ACT              |                      |        |         |      |      |       |
| inhalerIndications:  |                      |        |         |      |      |       |
| states uses 2x       |                      |        |         |      |      |       |
| monthly average      |                      |        |         |      |      |       |
+----------------------+----------------------+--------+---------+------+------+-------+
|   insulin aspart     | Inject  into the     |        |         |      |      | Activ |
| (NOVOLOG) 100        | skin 3 (three) times |        |         |      |      | e     |
| UNIT/ML injection    |  daily before meals. |        |         |      |      |       |
|                      |  Sliding scale       |        |         |      |      |       |
+----------------------+----------------------+--------+---------+------+------+-------+
|   apixaban (ELIQUIS) | Take 1 tablet by     |   60   | 0       | 12/0 |      | Activ |
|  2.5 MG tablet       | mouth 2 (two) times  | tablet |         | 3/20 |      | e     |
|                      | daily.               |        |         | 18   |      |       |
+----------------------+----------------------+--------+---------+------+------+-------+
|   isosorbide         | Take 1 tablet by     |   30   | 1       | 12/0 | 12/0 | Activ |
| mononitrate (IMDUR)  | mouth daily.         | tablet |         | 5/20 | 5/20 | e     |
| 60 MG 24 hr tablet   |                      |        |         | 18   | 19   |       |
+----------------------+----------------------+--------+---------+------+------+-------+
|   ondansetron        | Take 4 mg by mouth   |        |         | 09/ |      | Activ |
| (ZOFRAN-ODT) 4 MG    | every 8 (eight)      |        |         | / |      | e     |
| disintegrating       | hours as needed.     |        |         | 18   |      |       |
| tablet               |                      |        |         |      |      |       |
+----------------------+----------------------+--------+---------+------+------+-------+
|   insulin degludec   | Inject 21 units      |        |         |      |      | Activ |
| (TRESIBA) 100        | every night          |        |         |      |      | e     |
| UNIT/ML injection    |                      |        |         |      |      |       |
+----------------------+----------------------+--------+---------+------+------+-------+
 
|   esomeprazole       | Take 1 capsule by    |        |         |      |      | Activ |
| (NEXIUM) 40 MG       | mouth every day      |        |         |      |      | e     |
| capsule              |                      |        |         |      |      |       |
+----------------------+----------------------+--------+---------+------+------+-------+
|                      | Take 1 tablet by     |   30   | 0       | 01/0 |      | Activ |
| HYDROcodone-acetamin | mouth every 4 (four) | tablet |         | 4/20 |      | e     |
| ophen (NORCO) 5-325  |  hours as needed.    |        |         | 19   |      |       |
| MG per tablet        |                      |        |         |      |      |       |
+----------------------+----------------------+--------+---------+------+------+-------+
|   carvedilol (COREG) | Take 1 tablet by     |   60   | 0       | 01/1 | 01/1 | Activ |
|  12.5 MG tablet      | mouth 2 (two) times  | tablet |         | /20 | 7/20 | e     |
|                      | daily with meals.    |        |         | 19   | 20   |       |
+----------------------+----------------------+--------+---------+------+------+-------+
|   nitroGLYCERIN      | Place 1 tablet under |   30   | 0       |  |  | Activ |
| (NITROSTAT) 0.4 MG   |  the tongue every 5  | tablet |         | /20 | 7 | e     |
| SL tablet            | (five) minutes as    |        |         | 19   | 20   |       |
|                      | needed for Chest     |        |         |      |      |       |
|                      | pain.                |        |         |      |      |       |
+----------------------+----------------------+--------+---------+------+------+-------+
|   prochlorperazine 5 | TAKE ONE TABLET BY   |   60   | 11      |  |      | Activ |
|  MG tablet           | MOUTH TWICE DAILY AS | tablet |         |  |      | e     |
|                      |  NEEDED FOR NAUSEA   |        |         | 19   |      |       |
+----------------------+----------------------+--------+---------+------+------+-------+
|   losartan (COZAAR)  | Take 100 mg by mouth |        |         | /2 |      | Activ |
| 100 MG tablet        |  daily.              |        |         | 020 |      | e     |
|                      |                      |        |         | 19   |      |       |
+----------------------+----------------------+--------+---------+------+------+-------+
|   TRINTELLIX 20 MG   | Take 20 mg by mouth  |        |         | /2 |      | Activ |
| TABS                 | every evening.       |        |         | 020 |      | e     |
|                      |                      |        |         | 19   |      |       |
+----------------------+----------------------+--------+---------+------+------+-------+
|   atorvastatin       | Take 80 mg by mouth  |        |         | 02/0 |      | Activ |
| (LIPITOR) 80 MG      | nightly.             |        |         | 3/20 |      | e     |
| tablet               |                      |        |         | 19   |      |       |
+----------------------+----------------------+--------+---------+------+------+-------+
|   LORazepam (ATIVAN) | Take 0.5 tablets by  |        |         | 02/1 |      | Activ |
|  1 MG tablet         | mouth every 8        |        |         | 5/20 |      | e     |
|                      | (eight) hours as     |        |         | 19   |      |       |
|                      | needed for Anxiety.  |        |         |      |      |       |
|                      | Patient states she   |        |         |      |      |       |
|                      | has the 1mg tablets  |        |         |      |      |       |
|                      | at home and that     |        |         |      |      |       |
|                      | they are scored and  |        |         |      |      |       |
|                      | she will split them  |        |         |      |      |       |
|                      | in half              |        |         |      |      |       |
+----------------------+----------------------+--------+---------+------+------+-------+
|   glucose blood test | Contour Next Test    |        |         |      |      | Activ |
|  strip               | Strips USE 1 STRIP   |        |         |      |      | e     |
|                      | TO CHECK GLUCOSE     |        |         |      |      |       |
|                      | THREE TIMES DAILY    |        |         |      |      |       |
|                      | FOR 30 DAYS          |        |         |      |      |       |
+----------------------+----------------------+--------+---------+------+------+-------+
|   LORazepam (ATIVAN) |                      |        |         | 02/2 |      | Activ |
|  0.5 MG tablet       |                      |        |         | 1/20 |      | e     |
|                      |                      |        |         | 19   |      |       |
+----------------------+----------------------+--------+---------+------+------+-------+
|   traMADol (ULTRAM)  | Take 1 tablet by     |   30   | 0       | 02/2 |      | Activ |
| 50 MG tablet         | mouth every 6 (six)  | tablet |         | 8/20 |      | e     |
|                      | hours as needed for  |        |         | 19   |      |       |
|                      | Pain.                |        |         |      |      |       |
 
+----------------------+----------------------+--------+---------+------+------+-------+
|   mupirocin          | Apply  topically     |   22 g | 0       | 03/0 | 03/1 | Expir |
| (BACTROBAN) 2 %      | daily for 7 days.    |        |         | 6/20 | 3/20 | ed    |
| ointment             |                      |        |         | 19   | 19   |       |
+----------------------+----------------------+--------+---------+------+------+-------+
 
 
 
 Active Problems
 
 
+-----------------------------------------------------+------------+
| Problem                                             | Noted Date |
+-----------------------------------------------------+------------+
| Macrocytosis                                        | 2019 |
+-----------------------------------------------------+------------+
| Lactic acidosis                                     | 2019 |
+-----------------------------------------------------+------------+
| Falls frequently                                    | 2019 |
+-----------------------------------------------------+------------+
| Restless legs syndrome                              | 2019 |
+-----------------------------------------------------+------------+
| Overactive bladder                                  | 2019 |
+-----------------------------------------------------+------------+
| CAD (coronary artery disease)                       | 2018 |
+-----------------------------------------------------+------------+
| Chronic multifocal osteomyelitis of left foot (HCC) | 2018 |
+-----------------------------------------------------+------------+
| PVD (peripheral vascular disease) (formerly [facility])             | 2018 |
+-----------------------------------------------------+------------+
| COPD (chronic obstructive pulmonary disease) (formerly [facility])  | 2018 |
+-----------------------------------------------------+------------+
| Mixed hyperlipidemia                                | 2018 |
+-----------------------------------------------------+------------+
 
 
 
+---------------------------------------------------------------+
|   Overview:   Last Assessment & Plan:                         |
| Moderate dose statin and don't titrate due to ESRD and ASHD.  |
| - Continue Atorvastatin 20mg                                  |
+---------------------------------------------------------------+
 
 
 
+-------------------------------------------------------+------------+
| ICD (implantable cardioverter-defibrillator) in place | 2018 |
+-------------------------------------------------------+------------+
 
 
 
+-------------------------------------------------------------------+
|   Overview:   Overview: Formatting of this note may be different  |
| from the original. MODEL NAME MODEL# SERIAL# DATE IMPLANTED       |
| GENERATOR [employer] Dynagen EL ICD DF4 [id number] |
|  RV LEAD [employer] [place] [address] SG [id number] 18  |
| Indication: Nonischemic dilated cardiopathy with persistent       |
| severely depressed left ventricular systolic function, LVEF of    |
| LVEF of 30%                                                       |
+-------------------------------------------------------------------+
 
 
 
 
+-----------------------------------------------+------------+
| Implantable defibrillator reprogramming/check | 2018 |
+-----------------------------------------------+------------+
| Pleural effusion                              | 2018 |
+-----------------------------------------------+------------+
 
 
 
+-------------------------------------------------------------------+
|   Overview:   Last Assessment & Plan: Post MV repair and CABG x2  |
| 2018 and recurrent pleural effusions and elevated ESR.       |
| Plan:Received 1 time dose of Colchicine Started on Prednisone per |
|  surgery CXR today shows stable bilateral pleural effusions       |
|Received 1 time dose of Colchicine                                 |
|Started on Prednisone per surgery                                  |
|CXR today shows stable bilateral pleural effusions                 |
+-------------------------------------------------------------------+
 
 
 
+-------------------------------------------------+------------+
| Prolonged Q-T interval on ECG                   | 2018 |
+-------------------------------------------------+------------+
| Chronic systolic congestive heart failure (HCC) | 2018 |
+-------------------------------------------------+------------+
 
 
 
+-------------------------------------------------------------------+
|   Overview:   Overview: Echocardiogram 2018 shows mild       |
| biatrial dilatation, mild left ventricular dilatation with a mild |
|  eccentric left ventricular hypertrophy, there is a moderate      |
| global hypokinesis of left ventricle, lvef is 30-35%, normal      |
| right ventricular size and wall thickness with a mildly reduced   |
| right ventricular systolic function, mildly thickened and         |
| calcified trileaflet aortic valve with adequate opening, there is |
|  a mild aortic valve insufficiency, mildly thickened and          |
| calcified mitral valve suggesting myxomatous change with evidence |
|  of mitral valve repair, there is at least moderate central       |
| mitral valve regurgitation, mild tricuspid valve regurgitation,   |
| mild to moderate pulmonary hypertension with a peak systolic      |
| pressure of 50-55 mmhg, normal ivc with normal respiratory        |
| collapse, when compared to echocardiography on 3/7/18, left       |
| ventricular systolicsystolic function is slightly                 |
| improved.Echocardiogram 2018 show overall left ventricular    |
| systolic function is severely impaired with, an EF between 20 -   |
| 25 %, there is mild aortic regurgitation, there is mild aortic    |
| stenosis present, mild-to-moderate tricuspid regurgitation        |
| present, there is mild pulmonary hypertension, pleural effusion   |
| present. Duglas Ornelas MD, [facility]; [facility] Last Assessment & Plan: EF |
|  25% s/p CABG on 18 but now down to 15%. Plan:Continue Coreg |
|  and Losartan Hemodialysis for fluid removal.Strict I/O and daily |
|  weights.                                                         |
+-------------------------------------------------------------------+
 
 
 
 
+------------------+------------+
| Chronic diarrhea | 2018 |
+------------------+------------+
 
 
 
+---------------------------------------------+
|   Overview:   Colitis as working diagnosis. |
+---------------------------------------------+
 
 
 
+---------------------------------------------+------------+
| Chronic anticoagulation                     | 2018 |
+---------------------------------------------+------------+
| Plantar ulcer (formerly [facility])                         | 2018 |
+---------------------------------------------+------------+
| Steroid-induced hyperglycemia               | 2018 |
+---------------------------------------------+------------+
| Moderate protein-calorie malnutrition (HCC) | 2018 |
+---------------------------------------------+------------+
| Stress hyperglycemia                        | 2018 |
+---------------------------------------------+------------+
| Cardiomyopathy (HCC)                        | 2018 |
+---------------------------------------------+------------+
 
 
 
+-------------------------------------------------------------------+
|   Overview:   Overview: Ischemic (3 V CAD) and non ischemic       |
| (Hypertension and MR and DM and ESRD). Tolerated metoprolol,      |
| losartan but avoid spironolactone due to ESRD. Last Assessment &  |
| Plan: Severe ischemic cardiomyopathy (EF 15%) and ESRD on HD who  |
| presented with dyspnea related to bilateral pleural effusions and |
|  episodic AF with RVR now s/p thoracentesis with significant      |
| improvement in her symptoms and in NSR. Plan:Volume removal with  |
| dialysis as toleratedLasix 80 mg twice a day Coreg and low dose   |
| losartan. Up titrate as tolerated but working on fluid removal    |
| and maintaining adequate BP. Strict I/O with daily weights.       |
|Strict I/O with daily weights.                                     |
+-------------------------------------------------------------------+
 
 
 
+--------------------------------------+------------+
| Paroxysmal atrial fibrillation (HCC) | 2018 |
+--------------------------------------+------------+
 
 
 
+-------------------------------------------------------------------------------------------
--------------------------------------------------------+
|   Overview:   Overview: PAF with dialysis in the past and                                 
                                                        |
| recurrent post op MV/CABG surgery. Last Assessment & Plan:                                
                                                        |
| Remains in NSRNormal atrial size by echocardiogram. Plan:Continue                         
                                                        |
|  Coreg with up-titration as toleratedAmiodarone 400 mg BID while                          
                                                        |
 
| inpatient and then transition to 200 mg BID for 2 weeks and then                          
                                                        |
| 200 mg daily. Continue po 1-3 months post discharge. Continue                             
                                                        |
| warfarin for CVA prophylaxis, INR 2.3 today                                               
                                                        |
|Plan:                                                                                      
                                                       |
|Continue Coreg with up-titration as tolerated                                              
                                                        |
|Amiodarone 400 mg BID while inpatient and then transition to 200 mg BID for 2 weeks and the
n 200 mg daily. Continue po 1-3 months post discharge.  |
|Continue warfarin for CVA prophylaxis, INR 2.3 today                                       
                                                        |
+-------------------------------------------------------------------------------------------
--------------------------------------------------------+
 
 
 
+--------------+------------+
| S/P CABG x 2 | 2018 |
+--------------+------------+
 
 
 
+-------------------------------------------------------------------------------------------
-----------+
|   Overview:   Overview: SVG's to OM and RCA at time of MV ring                            
           |
| Cristopher #28. POLY left.Last Assessment & Plan: 18: Mitral                            
           |
| valve repair with a 28 Cristopher annuloplasty ring; CABG x2 (SVG                           
           |
| to OM and SVG to RCA)Plan:ASA, statin, BB and ARB                                         
           |
|18: Mitral valve repair with a 28 Cristopher annuloplasty ring; CABG x2 (SVG to OM and S
VG to RCA) |
|                                                                                           
           |
|Plan:                                                                                      
          |
|ASA, statin, BB and ARB                                                                    
           |
+-------------------------------------------------------------------------------------------
-----------+
 
 
 
+-------------------------+------------+
| S/P mitral valve repair | 2018 |
+-------------------------+------------+
 
 
 
+----------------------------------------------------------------------------------------+
|   Overview:   Overview: Cristopher ring 2.16.18 ([place]) for severe                       |
| MR. POLY left. CABG too, SVG x 2 to OM and RCA.Last Assessment &                        |
| Plan: Cristopher ring 2.16.18 ([place]) for severe MR. POLY left. CABG                      |
|  too, SVG x 2 to OM and RCA.                                                           |
|Cristopher ring 2.16.18 ([place]) for severe MR. POLY left. CABG too, SVG x 2 to OM and RCA. |
 
+----------------------------------------------------------------------------------------+
 
 
 
+----------------------------------+------------+
| Osteomyelitis of left foot (HCC) | 2017 |
+----------------------------------+------------+
 
 
 
+-------------------------------------------------------------------+
|   Overview:   Added automatically from request for surgery [id number] |
+-------------------------------------------------------------------+
 
 
 
+-----------------------------------------------------+------------+
| Foot ulcer due to DM  (HCC)                         | 2017 |
+-----------------------------------------------------+------------+
| Severe protein-calorie malnutrition (HCC)           | 2017 |
+-----------------------------------------------------+------------+
| Loss of weight                                      | 2017 |
+-----------------------------------------------------+------------+
| Dysphagia, unspecified                              | 2017 |
+-----------------------------------------------------+------------+
| Diabetic foot ulcer (HCC)                           | 2017 |
+-----------------------------------------------------+------------+
| Dyslipidemia                                        | 2017 |
+-----------------------------------------------------+------------+
| Gastroesophageal reflux disease without esophagitis | 2017 |
+-----------------------------------------------------+------------+
| Hypertension, essential                             | 2016 |
+-----------------------------------------------------+------------+
 
 
 
+----------------------------------------------------+
|   Overview:   Last Assessment & Plan:              |
| Will resume metoprolol and losartan as BP allows.  |
+----------------------------------------------------+
 
 
 
+--------------------------------------+------------+
| ESRD (end stage renal disease) (formerly [facility]) | 2016 |
+--------------------------------------+------------+
 
 
 
+-------------------------------------------------------------------+
|   Overview:   Primary nephrologist is Dr. Asher. She was on PD    |
| and eventually converted to HD and is dialyzed TTS using left UE  |
| AVF at [facility].                                          |
+-------------------------------------------------------------------+
 
 
 
+------------------------------------------------------------------+------------+
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
| dialysis, with long-term current use of insulin (formerly [facility])            |            |
 
+------------------------------------------------------------------+------------+
| Anemia in ESRD (end-stage renal disease) (formerly [facility])                   | 2016 |
+------------------------------------------------------------------+------------+
| Proteinuria                                                      | 2011 |
+------------------------------------------------------------------+------------+
| Vitamin D deficiency                                             | 2011 |
+------------------------------------------------------------------+------------+
| Secondary hyperparathyroidism (formerly [facility])                              | 2011 |
+------------------------------------------------------------------+------------+
| Recurrent UTI                                                    | 2011 |
+------------------------------------------------------------------+------------+
| Coronary artery disease involving native coronary artery of      | 2011 |
| native heart without angina pectoris                             |            |
+------------------------------------------------------------------+------------+
 
 
 
+-------------------------------------------------------------------+
|   Overview:   Overview: [facility] on 2012 shows totally occluded   |
| right coronary artery with collateral filling from the left, no   |
| significant stenoses within the left anterior descending artery   |
| and circumflex artery. Patent stents within the left anterior     |
| descending artery, normal intracardiac pressure, mild narrowing   |
| within the distal aorta and iliac arteries. - Sherie Bartholomew,   |
| MDEchocardiogram on 2017 shows Study: a 2-dimensional        |
| transthoracic echocardiogram with m-mode, spectral and color flow |
|  Doppler was performed, left ventricular systolic function is     |
| low-normal with, an EF between 50 - 55 %, the left ventricle      |
| cavity size is normal, the RV is normal in size and function, the |
|  left atrium is normal in size, the right atrium is normal in     |
| size, aortic valve is trileaflet and is mildly thickened, there   |
| is mild aortic regurgitation, there is no evidence of aortic      |
| stenosis, the mitral valve is normal. - OLIVA Huffman |
|  on 2017 shows severe triple-vessel coronary artery disease   |
| with moderate ostial left anterior descending artery and patent   |
| stent in the midsegment, moderate stenosis in the second obtuse   |
| marginal branch and total occlusion of the proximal right         |
| coronary artery, which is known from before, given the patient's  |
| symptoms at this time, recommendation given. The patient          |
| understands. We will proceed with further medical management with |
|  blood pressure control. Also, we will optimize our blood         |
| pressure management. Further evaluation for the ostial left       |
| anterior descending artery and circumflex lesion upon recurrent   |
| symptoms for the patient, the patient will be evaluated for any   |
| further symptoms and fractional flow reserve of the left anterior |
|  descending artery and possible intervention on the left          |
| circumflex system will be considered. - OLIVA Diane   |
| on 2017 shows severe 3-vessel coronary artery disease with   |
| moderate to severe proximal left anterior descending with         |
| significant fractional flow reserve value of 0.77, severe mid     |
| large marginal branch, and chronically occluded right coronary    |
| artery with left-to-right collaterals, normal left ventricular    |
| end-diastolic pressure. - OLIVA Lambert on 2017     |
| shows three-vessel coronary artery disease with a chronically     |
| occluded right coronary artery with left-to-right collaterals,    |
| severe proximal left anterior descending artery with a patent     |
| stent in the mid left anterior descending artery and a            |
| significant FFR value of 0.77 during diagnostic angiogram on      |
| 2017, and severe disease of the second marginal branch |
|  of the left circumflex artery, successful PTCA and stenting of   |
 
| the second marginal branch of the left circumflex artery using a  |
| 2.25 x 16 and a 2.25 x 8 mm overlapping synergy drug-eluting      |
| stents postdilated using a 2.25 noncompliant balloon, successful  |
| direct stenting of the proximal and ostium of the left anterior   |
| descending artery using a 3.5 x 16 mm Synergy drug-eluting stent  |
| postdilated using a 3.5 noncompliant balloon. - Abdelazim Hashim, |
|  MDEchocardiogram on 2018 shows the left ventricle is normal |
|  in size, wall thickness and moderately impaired systolic         |
| function EF 35-40%. Inferior, inferolateral and anterolateral     |
| hypokinesis, the right ventricle is normal in size and function,  |
| severe mitral regurgitation with moderately dilated left atrium,  |
| mild tricuspid regurgitation and mild pulmonary hypertension RVSP |
|  48 mmHg, there is no pericardial effusion, new wall motion       |
| abnormalities and new severe mitral regurgitation compared to     |
| prior study. - Celestino Porras, MDL[facility] on 2018 shows         |
| three-vessel coronary artery disease with widely patent stent in  |
| the left anterior descending artery and severe stent restenosis   |
| in the marginal branch, occluded right coronary artery with       |
| collateral from left to right, severe left ventricular            |
| dysfunction with severe mitral regurgitation, status post         |
| percutaneous transluminal coronary angioplasty of in-stent        |
| restenosis within the marginal branch, recommend consideration    |
| for mitral valve replacement and 2-vessel coronary artery bypass  |
| surgery to the right coronary artery and marginal branch. - Iyad  |
| MD DustinTEE and CABG x2 on 2018 shows Severely reduced LV  |
| systolic function. Visually estimated LVEF 25%, normal LV size    |
| and shape. Normal wall thickness, normal RV size with normal      |
| function, LA enlarged. POLY normal size without SEC, masses, or    |
| thrombi. IAS intact, no PFO detected, mitral valve with bileaflet |
|  thickening and severely restricted motion, associated with       |
| ventricular tethering. Severe functional MR with central jet,     |
| trileaflet aortic valve with mild sclerosis. No significant       |
| aortic stenosis. Mild AI with central jet, tricuspid valve normal |
|  structure and function. Trace TR, pulmonic valve normal Trace    |
| MT, visible portions of ascending aorta and arch normal. Grade II |
|  atherosclerotic disease of descending aorta, pulmonary artery    |
| normal, normal pericardium. No pericardial or pleural effusions,  |
| left ventricular function slightly improved and right ventricular |
|  function unchanged compared to preop,  28 mm mitral valve        |
| annuloplasty ring is well-seated with an acceptable inflow        |
| gradient across the mitral valve and no MR noted, no change in    |
| the aortic, tricuspid, or pulmonic valves compared to preop, no   |
| change in visible portions of aorta after decannulation, LV and   |
| RV function unchanged after sternal closure. - Jagjit Hickey,       |
| MDEchocardiogram on 3/7/2018 shows a complete two-dimensional     |
| transthoracic echocardiogram was performed (2D, M-mode, Doppler   |
| and color flow Doppler), left ventricular systolic function is    |
| severely reduced, the visually estimated LV ejection fraction is  |
| 15%, the left and right atrial chambers are both normal in size,  |
| there is mild mitral regurgitation, an annuloplasty ring is noted |
|  in the mitral position, there is mild tricuspid regurgitation,   |
| Estimated PA pressure is 42 mmHg, the aortic valve leaflets       |
| appear mildly calcified, the aortic valve opens well, the aortic  |
| valve mean systolic gradient was measured at 9 mm Hg. - Star    |
| Missy Pino Assessment & Plan: [facility] in [facility] 5 months     |
| ago. 1 week off Plavix for CAGG/MVr surgery 2.16. Now and in the  |
| past with [facility] statin - data in dialysis patients for primary  |
| prevention with statin is not beneficial but this is secondary    |
| prevention. However, instead of high dose statin, moderate dose   |
| due to ESRD with slight increased risk of rhabdo. - Warfarin -    |
 
| ASA - Moderate dose Statin                                        |
+-------------------------------------------------------------------+
 
 
 
+------------+------------+
| Depression | 2011 |
+------------+------------+
 
 
 
 Resolved Problems
 
 
+----------------------------------------------------------------+----------+-----------+
| Problem                                                        | Noted    | Resolved  |
|                                                                | Date     | Date      |
+----------------------------------------------------------------+----------+-----------+
| Chest pain                                                     | 20 |  |
|                                                                | 19       | 9         |
+----------------------------------------------------------------+----------+-----------+
| Chest pain                                                     | 20 |  |
|                                                                | 18       | 8         |
+----------------------------------------------------------------+----------+-----------+
| Severe mitral regurgitation                                    | 20 |  |
|                                                                | 18       | 8         |
+----------------------------------------------------------------+----------+-----------+
| Precordial pain                                                | 20 |  |
|                                                                | 18       | 8         |
+----------------------------------------------------------------+----------+-----------+
| NSTEMI (non-ST elevated myocardial infarction) (HCC)           | 20 |  |
|                                                                | 17       | 8         |
+----------------------------------------------------------------+----------+-----------+
| Nausea and vomiting                                            | 20 |  |
|                                                                | 17       | 7         |
+----------------------------------------------------------------+----------+-----------+
| Abdominal pain, left upper quadrant                            | 20 |  |
|                                                                | 17       | 7         |
+----------------------------------------------------------------+----------+-----------+
| Partial small bowel obstruction (HCC)                          | 20 |  |
|                                                                | 17       | 7         |
+----------------------------------------------------------------+----------+-----------+
| Gastroenteritis                                                | 20 |  |
|                                                                | 17       | 7         |
+----------------------------------------------------------------+----------+-----------+
| Transient alteration of awareness                              | 20 |  |
|                                                                | 17       | 7         |
+----------------------------------------------------------------+----------+-----------+
| Pain of left hip joint                                         | 20 |  |
|                                                                | 17       | 7         |
+----------------------------------------------------------------+----------+-----------+
| Prolonged Q-T interval on ECG                                  | 20 |  |
|                                                                | 17       | 7         |
+----------------------------------------------------------------+----------+-----------+
| Hyperphosphatemia                                              | 20 |  |
|                                                                | 16       | 7         |
+----------------------------------------------------------------+----------+-----------+
| Fracture of left hip due to osteoporosis (HCC)                 | 20 |  |
|                                                                | 16       | 7         |
+----------------------------------------------------------------+----------+-----------+
 
| Closed fracture of base of fifth metatarsal bone of right foot | 20 |  |
|                                                                | 16       | 7         |
+----------------------------------------------------------------+----------+-----------+
| ESRD on peritoneal dialysis                                    | 20 |  |
|                                                                | 16       | 7         |
+----------------------------------------------------------------+----------+-----------+
| Acute abdominal pain                                           | 20 |  |
|                                                                | 16       | 7         |
+----------------------------------------------------------------+----------+-----------+
| Blood per rectum                                               | 20 |  |
|                                                                | 16       | 7         |
+----------------------------------------------------------------+----------+-----------+
| CKD (chronic kidney disease), stage V                          | 20 |  |
|                                                                | 11       | 6         |
+----------------------------------------------------------------+----------+-----------+
| Chronic diarrhea                                               | 20 |  |
|                                                                | 11       | 7         |
+----------------------------------------------------------------+----------+-----------+
 
 
 
+-------------------------+
|   Overview:   Resolved. |
+-------------------------+
 
 
 
+--------------------+----------+-----------+
| Stool incontinence | 20 |  |
|                    | 11       | 8         |
+--------------------+----------+-----------+
 
 
 
+-------------------------+
|   Overview:   Resolved. |
+-------------------------+
 
 
 
 Encounters
 
 
+--------+-------------+-----------+----------------------+----------------------+
| Date   | Type        | Specialty | Care Team            | Description          |
+--------+-------------+-----------+----------------------+----------------------+
| / | Telephone   |           |   Srinivasan Abdi,  | Other (Requesting    |
|    |             |           | DPM                  | status on form for   |
|        |             |           |                      | shoe order)          |
+--------+-------------+-----------+----------------------+----------------------+
| / | Documentati |           |   João Asher MD  | Other (February      |
| 2019   | on Only     |           |                      | [facility] |
|        |             |           |                      |  Dialysis Rounding   |
|        |             |           |                      | Note)                |
+--------+-------------+-----------+----------------------+----------------------+
| / | Office      |           |   Srinivasan Abdi L,  | Closed nondisplaced  |
|    | Visit       |           | DPM                  | fracture of distal   |
|        |             |           |                      | phalanx of right     |
|        |             |           |                      | great toe with       |
|        |             |           |                      | routine healing,     |
 
|        |             |           |                      | subsequent encounter |
|        |             |           |                      |  (Primary Dx);       |
|        |             |           |                      | Post-operative       |
|        |             |           |                      | state; Open wound of |
|        |             |           |                      |  toe, subsequent     |
|        |             |           |                      | encounter            |
+--------+-------------+-----------+----------------------+----------------------+
| / | Documentati |           |   Peabody, Jessica   | [facility]        |
| 2019   | on Only     |           | M, RN                | medical records      |
|        |             |           |                      | request)             |
+--------+-------------+-----------+----------------------+----------------------+
| / | Office      |           |   Kirt Mclaughlin MD     | Steal syndrome as    |
| 2019   | Visit       |           |                      | complication of      |
|        |             |           |                      | dialysis access,     |
|        |             |           |                      | sequela (Primary     |
|        |             |           |                      | Dx); ESRD (end stage |
|        |             |           |                      |  renal disease)      |
|        |             |           |                      | (formerly [facility]); PAD           |
|        |             |           |                      | (peripheral artery   |
|        |             |           |                      | disease) (formerly [facility])       |
+--------+-------------+-----------+----------------------+----------------------+
| / | Orders Only |           |   Dionne Danielson MA  |                      |
| 2019   |             |           |                      |                      |
+--------+-------------+-----------+----------------------+----------------------+
| / | Office      |           |   Srinivasan Abdi,  | Closed nondisplaced  |
|    | Visit       |           | DPM                  | fracture of distal   |
|        |             |           |                      | phalanx of right     |
|        |             |           |                      | great toe with       |
|        |             |           |                      | routine healing,     |
|        |             |           |                      | subsequent encounter |
|        |             |           |                      |  (Primary Dx);       |
|        |             |           |                      | Post-operative       |
|        |             |           |                      | state; Toe pain,     |
|        |             |           |                      | right                |
+--------+-------------+-----------+----------------------+----------------------+
| 02/15/ | Documentati |           |   João Asher MD  | Other (   | on Only     |           |                      | [facility] |
|        |             |           |                      |  Dialysis Rounding   |
|        |             |           |                      | Note)                |
+--------+-------------+-----------+----------------------+----------------------+
| / | Emergency   |           |   Cookson, Rachel M, | Fall in home,        |
| 2019 - |             |           |  DO  Dorota,       | initial encounter    |
|        |             |           | MD Ginny            | (Primary Dx);        |
| 02/15/ |             |           | Baltazar Isidro,   | Cellulitis of right  |
|    |             |           | MD                   | toe; Closed          |
|        |             |           |                      | displaced fracture   |
|        |             |           |                      | of distal phalanx of |
|        |             |           |                      |  right great toe,    |
|        |             |           |                      | initial encounter;   |
|        |             |           |                      | Generalized          |
|        |             |           |                      | weakness; Fatigue,   |
|        |             |           |                      | unspecified type     |
+--------+-------------+-----------+----------------------+----------------------+
 
 
 
+---+-------------+
|   |   Discharge |
|   |  Summaries  |
|   | -           |
 
|   | Sanna, |
|   |  MD Baltazar  |
|   | -           |
|   | 02/15/2019  |
|   |  2:09 PM    |
|   | PST         |
|   | Formatting  |
|   | of this     |
|   | note may be |
|   |  different  |
|   | from the    |
|   | original.Pa |
|   | tient:      |
|   | Cherie       |
|   | Cinthya     |
|   | Wilfredo      |
|   |     :    |
|   | 1950    |
|   |   MRN:      |
|   | [id number] |
|   | te of       |
|   | Admission:  |
|   |  2019  |
|   |  Date of    |
|   | Discharge:  |
|   |  2/15/2019  |
|   |   Treatment |
|   |  Team:      |
|   | Consulting  |
|   | Physician:  |
|   | Srinivasan APONTE     |
|   | Julian,    |
|   | DPMConsulti |
|   | ng          |
|   | Physician:  |
|   | Andrés Elliott, |
|   |             |
|   | MDConsultin |
|   | g           |
|   | Physician:  |
|   | João H      |
|   | Akoum,      |
|   | MDAdmitting |
|   |  Provider:  |
|   | Tuhin       |
|   | Dorota,   |
|   | MDDischargi |
|   | ng          |
|   | Provider:   |
|   | BALTAZAR       |
|   | BEISWENGER, |
|   |             |
|   | MDDischarge |
|   |  Diagnoses: |
|   |  Principal  |
|   | Problem:    |
|   | Falls       |
|   | frequentlyA |
|   | ctive       |
|   | Problems:   |
 
|   | Depression  |
|   |  ESRD (end  |
|   | stage renal |
|   |  disease)   |
|   | (HCC)  Type |
|   |  2 diabetes |
|   |  mellitus   |
|   | with        |
|   | chronic     |
|   | kidney      |
|   | disease on  |
|   | chronic     |
|   | dialysis,   |
|   | with        |
|   | long-term   |
|   | current use |
|   |  of insulin |
|   |  (HCC)      |
|   | Anemia in   |
|   | ESRD        |
|   | (end-stage  |
|   | renal       |
|   | disease)    |
|   | (HCC)       |
|   | Hypertensio |
|   | n,          |
|   | essential   |
|   | Dyslipidemi |
|   | a           |
|   | Gastroesoph |
|   | ageal       |
|   | reflux      |
|   | disease     |
|   | without     |
|   | esophagitis |
|   |   Loss of   |
|   | weight      |
|   | Severe      |
|   | protein-karen |
|   | orie        |
|   | malnutritio |
|   | n (HCC)     |
|   | Chronic     |
|   | systolic    |
|   | congestive  |
|   | heart       |
|   | failure     |
|   | (HCC)  COPD |
|   |  (chronic   |
|   | obstructive |
|   |  pulmonary  |
|   | disease)    |
|   | (HCC)  ICD  |
|   | (implantabl |
|   | e           |
|   | cardioverte |
|   | r-defibrill |
|   | ator) in    |
|   | place       |
|   | Paroxysmal  |
 
|   | atrial      |
|   | fibrillatio |
|   | n (HCC)     |
|   | S/P CABG x  |
|   | 2  S/P      |
|   | mitral      |
|   | valve       |
|   | repair  PVD |
|   |             |
|   | (peripheral |
|   |  vascular   |
|   | disease)    |
|   | (HCC)  CAD  |
|   | (coronary   |
|   | artery      |
|   | disease)    |
|   | Macrocytosi |
|   | s  Lactic   |
|   | acidosis    |
|   | Restless    |
|   | legs        |
|   | syndrome    |
|   | Overactive  |
|   | bladderReso |
|   | lved        |
|   | Problems:   |
|   | * No        |
|   | resolved    |
|   | hospital    |
|   | problems. * |
|   |             |
|   | Procedures  |
|   | Performed:C |
|   | hief        |
|   | Complaint:R |
|   | eferral     |
|   | (Dr. Elliott)  |
|   | and Skin    |
|   | Complaint   |
|   | (right foot |
|   |  )Hospital  |
|   | Course:     |
|   | Frequent    |
|   | falls:      |
|   | Assessment: |
|   |   It was    |
|   | unclear     |
|   | initially   |
|   | The exact   |
|   | culprit and |
|   |  it was     |
|   | felt        |
|   | potentially |
|   |  she was a  |
|   | bit dry (as |
|   |  per        |
|   | nephrology  |
|   | who saw her |
|   |  this       |
|   | admission)  |
 
|   | as she had  |
|   | just had    |
|   | ultrafiltra |
|   | tion with   |
|   | HD, versus  |
|   | other       |
|   | culprit.    |
|   | However the |
|   |  patient    |
|   | did state   |
|   | that when   |
|   | she has     |
|   | recently    |
|   | fallen it   |
|   | was in the  |
|   | setting of  |
|   | not using   |
|   | her walker  |
|   | as she had  |
|   | been        |
|   | instructed  |
|   | to as she   |
|   | was only    |
|   | going a     |
|   | very short  |
|   | distance    |
|   | and felt    |
|   | she did not |
|   |  need it.   |
|   | She will be |
|   |  very       |
|   | diligent    |
|   | about using |
|   |  her walker |
|   |  and taking |
|   |  all needed |
|   |             |
|   | precautions |
|   | .  Coronary |
|   |  artery     |
|   | disease     |
|   | with        |
|   | history of  |
|   | CABG,       |
|   | peripheral  |
|   | vascular    |
|   | disease,    |
|   | hypertensio |
|   | n and       |
|   | hyperlipide |
|   | mark with    |
|   | history of  |
|   | paroxysmal  |
|   | atrial      |
|   | fibrillatio |
|   | n on        |
|   | anticoagula |
|   | tion:.      |
|   | Will resume |
|   |  PTA meds   |
 
|   | as below on |
|   |  d/c        |
|   | Overactive  |
|   | bladder:    |
|   | Continue    |
|   | oxybutynin. |
|   |   Requip    |
|   | for         |
|   | restless    |
|   | leg         |
|   | syndrome.   |
|   | Anxiety     |
|   | depression: |
|   |             |
|   | recommended |
|   |  to Refrain |
|   |  from using |
|   |             |
|   | benzodiazep |
|   | inesas much |
|   |  as         |
|   | possible    |
|   | and per d/w |
|   |  nephrology |
|   |  will       |
|   | decrease    |
|   | the ativan  |
|   | dose.       |
|   | Diabetes    |
|   | mellitus    |
|   | with        |
|   | diabetic    |
|   | nephropathy |
|   |  with       |
|   | end-stage   |
|   | renal       |
|   | disease on  |
|   | hemodialysi |
|   | s: Continue |
|   |  with       |
|   | current     |
|   | regimen for |
|   |  now,       |
|   | follow, and |
|   |  adjust as  |
|   | needed      |
|   | based on    |
|   | progress.   |
|   | F/u with    |
|   | outpatient  |
|   | providers   |
|   | With        |
|   | respect to  |
|   | her left    |
|   | foot prior  |
|   | injury and  |
|   | prior       |
|   | antibiotics |
|   | :           |
|   | Assessment: |
 
|   |   She was   |
|   | seen by ID  |
|   | here and    |
|   | recently    |
|   | had         |
|   | completed a |
|   |  course of  |
|   | antbx       |
|   | reportedly. |
|   |   They      |
|   | would like  |
|   | to have her |
|   |  off antbx  |
|   | for now and |
|   |  f/u        |
|   | withthem.   |
|   | Should f/u  |
|   | with her    |
|   | podiatrist  |
|   | dr abdi  |
|   | for her     |
|   | foot as     |
|   | well.       |
|   | Discharge   |
|   | Exam and    |
|   | Data:Vital  |
|   | Signs:BP    |
|   | 112/57 (BP  |
|   | Location:   |
|   | Right upper |
|   |  arm)  |    |
|   | Pulse 66  | |
|   |  Temp 98    |
|   |   F (36.7   |
|   |   C) (Oral) |
|   |   | Resp 18 |
|   |   | Ht      |
|   | 1.778 m (5' |
|   |  10")  | Wt |
|   |  65.3 kg    |
|   | (144 lb)  | |
|   |  SpO2 96%   |
|   | |           |
|   | Breastfeedi |
|   | ng? No  |   |
|   | BMI 20.66   |
|   | kg/m   I&O  |
|   | Last 3      |
|   | Shifts:  No |
|   |             |
|   | intake/outp |
|   | ut data     |
|   | recorded.Ph |
|   | ysical      |
|   | Examination |
|   | :           |
|   | Constitutio |
|   | nal: Alert  |
|   | and         |
|   | oriented to |
 
|   |  person,    |
|   | place, and  |
|   | time.       |
|   | Appears     |
|   | well-develo |
|   | ped and     |
|   | well-nouris |
|   | hed. HEENT: |
|   |  Neck       |
|   | supple, no  |
|   | JVD, non    |
|   | icteric     |
|   | sclera.Card |
|   | iovascular: |
|   |  Normal     |
|   | rate,       |
|   | regular     |
|   | rhythm,     |
|   | normal      |
|   | heart       |
|   | sounds, and |
|   |  intact     |
|   | distal      |
|   | pulses.     |
|   | Exam        |
|   | reveals no  |
|   | appreciated |
|   |  gallop or  |
|   | friction    |
|   | rub.  No    |
|   | murmur      |
|   | heard.Pulmo |
|   | nary/Chest: |
|   |  Effort     |
|   | normal and  |
|   | breath      |
|   | sounds      |
|   | normal. No  |
|   | stridor. No |
|   |             |
|   | respiratory |
|   |  distress.  |
|   |  no         |
|   | wheezes. no |
|   |  rales.     |
|   | exhibits no |
|   |             |
|   | tenderness. |
|   |  Abdominal: |
|   |  Soft.      |
|   | Bowel       |
|   | sounds are  |
|   | normal.     |
|   | exhibits no |
|   |             |
|   | distension. |
|   |  There is   |
|   | no          |
|   | tenderness. |
|   |  There is   |
 
|   | no rebound  |
|   | and no      |
|   | guarding.   |
|   | Extremeties |
|   | /Musculoske |
|   | letal:      |
|   | Normal      |
|   | general     |
|   | range of    |
|   | motion.exhi |
|   | bits no     |
|   | tenderness. |
|   |   exhibits  |
|   | no edema.   |
|   | Left foot   |
|   | in boot     |
|   | wrapped in  |
|   | dressing,   |
|   | see wound   |
|   | care and ID |
|   |  eval .     |
|   | Neurologica |
|   | l:  Alert   |
|   | and         |
|   | oriented to |
|   |  person,    |
|   | place, and  |
|   | time. Has   |
|   | normal      |
|   | reflexes.   |
|   | No gross    |
|   | cranial     |
|   | nerve or    |
|   | focal       |
|   | deficit.    |
|   | Exhibits    |
|   | normal      |
|   | muscle      |
|   | tone.       |
|   | Coordinatio |
|   | n           |
|   | normal.Skin |
|   | : Skin is   |
|   | warm and    |
|   | dry. No     |
|   | rash noted. |
|   |  No         |
|   | erythema.   |
|   | No pallor.  |
|   | Psychiatric |
|   | : Has a     |
|   | normal mood |
|   |  and affect |
|   |  given      |
|   | situation.  |
|   | Behavior is |
|   |  normal.    |
|   | Judgment    |
|   | normal for  |
|   | patient.    |
 
|   | Recent Labs |
|   |  Recent     |
|   | LabsLab     |
|   |  |
|   | 9 WBC 5.14  |
|   | HGB 10.1*   |
|   | HCT 30.9*   |
|   | *    |
|   | Recent      |
|   | LabsLab     |
|   |  |
|   | 9  K  |
|   | 4.7 CL 96*  |
|   | CO2 >40*    |
|   | BUN 9       |
|   | CREATININE  |
|   | 2.96* No    |
|   | results for |
|   |  input(s):  |
|   | INR in the  |
|   | last 168    |
|   | hours.Resul |
|   | ts          |
|   | Procedure   |
|   | Component   |
|   | Value Units |
|   |  Date/Time  |
|   |  Blood      |
|   | Culture Set |
|   |  2          |
|   | [[id number]]  |
|   | Collected:  |
|   |  19   |
|   | 1739        |
|   | Specimen:   |
|   | Blood from  |
|   | Blood,      |
|   | peripheral  |
|   | draw        |
|   | Updated:    |
|   | 19    |
|   | 2005  Blood |
|   |  Culture    |
|   | Set 1       |
|   | [[id number]]  |
|   | Collected:  |
|   |  19   |
|   | 1655        |
|   | Specimen:   |
|   | Blood from  |
|   | Blood Line  |
|   | Draw        |
|   | Updated:    |
|   | 19    |
|   | 2004        |
|   | Recent      |
|   | Radiology   |
|   | ResultsXr   |
|   | Toe Right 2 |
|   |  ViewResult |
 
|   |  Date:      |
|   | 2019No |
|   |             |
|   | radiographi |
|   | c evidence  |
|   | of          |
|   | osteomyelit |
|   | is seen.    |
|   | If further  |
|   | assessment  |
|   | is          |
|   | warranted,  |
|   | consider    |
|   | correlation |
|   |  with MRI.  |
|   | Potential   |
|   | acute       |
|   | fracture    |
|   | through the |
|   |  base of    |
|   | the distal  |
|   | phalanx.    |
|   | Signed by:  |
|   | Habbu, Gavin |
|   |  Sign       |
|   | Date/Time:  |
|   | 2019  |
|   | 5:15        |
|   | PMOutstandi |
|   | ng Issues:  |
|   |  F/u with   |
|   | podiatry,   |
|   | ID, PCP and |
|   |  resume HD. |
|   |   Discharge |
|   |             |
|   | Information |
|   | :Follow     |
|   | up:[facility]   |
|   | [address],      |
|   |  [address]     |
|   | [address],   |
|   | [place]         |
|   | [address] |
|   | [place]        |
|   | [id number] |
|   | [phone] |
|   | ule an      |
|   | appointment |
|   |  as soon as |
|   |  possible   |
|   | for a       |
|   | visitBrian  |
|   | L Abdi,  |
|   | [id number]      |
|   | [address], |
|   |  [place]        |
|   | [address] |
|   |  [place]         |
|   | [id number] |
 
|   | [phone] |
|   | ule an      |
|   | appointment |
|   |  as soon as |
|   |  possible   |
|   | for a       |
|   | visitplease |
|   |  continue   |
|   | prior to    |
|   | admission   |
|   | planJimmy   |
|   | Earl,       |
|   | [id number]    |
|   | [address]  |
|   | [address] |
|   | [place]       |
|   | [id number] |
|   | [phone] |
|   | lease call  |
|   | to see when |
|   |  they would |
|   |  like to    |
|   | see you in  |
|   | follow up   |
|   | resume care |
|   |  with all   |
|   | providers   |
|   | you were    |
|   | seeing      |
|   | prior to    |
|   | admission   |
|   | Medication  |
|   | List        |
|   | CHANGE how  |
|   | you take    |
|   | these       |
|   | medications |
|   |   LORazepam |
|   |  1 MG       |
|   | tabletRefil |
|   | ls:         |
|   | 0Commonly   |
|   | known as:   |
|   | ATIVANTake  |
|   | 0.5 tablets |
|   |  by mouth   |
|   | every 8     |
|   | (eight)     |
|   | hours as    |
|   | needed for  |
|   | Anxiety.    |
|   | Patient     |
|   | states she  |
|   | has the 1mg |
|   |  tablets at |
|   |  home and   |
|   | that they   |
|   | are scored  |
|   | and she     |
|   | will split  |
 
|   | them in     |
|   | halfWhat    |
|   | changed:    |
|   | how much to |
|   |  take       |
|   | additional  |
|   | instruction |
|   | s  CONTINUE |
|   |  taking     |
|   | these       |
|   | medications |
|   |   albuterol |
|   |  108 (90    |
|   | Base)       |
|   | MCG/ACT     |
|   | inhalerRefi |
|   | lls:        |
|   | 0Commonly   |
|   | known as:   |
|   | PROVENTIL   |
|   | HFA;VENTOLI |
|   | N HFA       |
|   | apixaban    |
|   | 2.5 MG      |
|   | tabletQTY:  |
|   |  60         |
|   | tabletRefil |
|   | ls:         |
|   | 0Commonly   |
|   | known as:   |
|   | ELIQUISTake |
|   |  1 tablet   |
|   | by mouth 2  |
|   | (two) times |
|   |  daily.     |
|   | atorvastati |
|   | n 80 MG     |
|   | tabletRefil |
|   | ls:         |
|   | 0Commonly   |
|   | known as:   |
|   | LIPITOR     |
|   | carvedilol  |
|   | 12.5 MG     |
|   | tabletQTY:  |
|   |  60         |
|   | tabletRefil |
|   | ls:         |
|   | 0Doctor's   |
|   | comments:   |
|   | Hold for    |
|   | SBP less    |
|   | than        |
|   | 100Hold for |
|   |  HR less    |
|   | than        |
|   | 60Commonly  |
|   | known as:   |
|   | COREGTake 1 |
|   |  tablet by  |
 
|   | mouth 2     |
|   | (two) times |
|   |  daily with |
|   |  meals.     |
|   | esomeprazol |
|   | e 40 MG     |
|   | capsuleRefi |
|   | lls:        |
|   | 0Commonly   |
|   | known as:   |
|   | NEXIUM      |
|   | HYDROcodone |
|   | -acetaminop |
|   | hen 5-325   |
|   | MG per      |
|   | tabletQTY:  |
|   |  30         |
|   | tabletRefil |
|   | ls:         |
|   | 0Commonly   |
|   | known as:   |
|   | NORCOTake 1 |
|   |  tablet by  |
|   | mouth every |
|   |  4 (four)   |
|   | hours as    |
|   | needed.     |
|   | insulin     |
|   | aspart 100  |
|   | UNIT/ML     |
|   | injectionRe |
|   | fills:      |
|   | 0Commonly   |
|   | known as:   |
|   | NOVOLOG     |
|   | insulin     |
|   | degludec    |
|   | 100 UNIT/ML |
|   |             |
|   | injectionRe |
|   | fills:      |
|   | 0Commonly   |
|   | known as:   |
|   | TRESIBA     |
|   | isosorbide  |
|   | mononitrate |
|   |  60 MG 24   |
|   | hr          |
|   | tabletQTY:  |
|   |  30         |
|   | tabletRefil |
|   | ls:  1Take  |
|   | 1 tablet by |
|   |  mouth      |
|   | daily.      |
|   | losartan    |
|   | 100 MG      |
|   | tabletRefil |
|   | ls:         |
|   | 0Commonly   |
 
|   | known as:   |
|   | COZAAR      |
|   | nitroGLYCER |
|   | IN 0.4 MG   |
|   | SL          |
|   | tabletQTY:  |
|   |  30         |
|   | tabletRefil |
|   | ls:         |
|   | 0Doctor's   |
|   | comments:   |
|   | Hold for    |
|   | SBP less    |
|   | than        |
|   | 100Commonly |
|   |  known as:  |
|   |             |
|   | NITROSTATPl |
|   | ace 1       |
|   | tablet      |
|   | under the   |
|   | tongue      |
|   | every 5     |
|   | (five)      |
|   | minutes as  |
|   | needed for  |
|   | Chest pain. |
|   |             |
|   | nortriptyli |
|   | ne 25 MG    |
|   | capsuleRefi |
|   | lls:        |
|   | 0Commonly   |
|   | known as:   |
|   | PAMELOR     |
|   | ondansetron |
|   |  4 MG       |
|   | disintegrat |
|   | ing         |
|   | tabletRefil |
|   | ls:         |
|   | 0Commonly   |
|   | known as:   |
|   | ZOFRAN-ODT  |
|   | oxybutynin  |
|   | 5 MG        |
|   | tabletRefil |
|   | ls:         |
|   | 0Commonly   |
|   | known as:   |
|   | DITROPAN    |
|   | prochlorper |
|   | azine 5 MG  |
|   | tabletQTY:  |
|   |  60         |
|   | tabletRefil |
|   | ls:         |
|   | 11Doctor's  |
|   | comments:   |
|   | Please      |
 
|   | consider 90 |
|   |  day        |
|   | supplies to |
|   |  promote    |
|   | better      |
|   | adherenceTA |
|   | KE ONE      |
|   | TABLET BY   |
|   | MOUTH TWICE |
|   |  DAILY AS   |
|   | NEEDED FOR  |
|   | NAUSEA      |
|   | rOPINIRole  |
|   | 0.25 MG     |
|   | tabletRefil |
|   | ls:         |
|   | 0Commonly   |
|   | known as:   |
|   | REQUIP      |
|   | TRINTELLIX  |
|   | 20 MG       |
|   | TabsRefills |
|   | :  0Generic |
|   |  drug:      |
|   | Vortioxetin |
|   | e HBr  You  |
|   | might also  |
|   | be taking   |
|   | other       |
|   | medications |
|   |  not listed |
|   |  above. If  |
|   | you have    |
|   | questions   |
|   | about any   |
|   | of your     |
|   | other       |
|   | medications |
|   | , talk to   |
|   | the person  |
|   | who         |
|   | prescribed  |
|   | them or     |
|   | your        |
|   | Primary     |
|   | Care        |
|   | Provider.   |
|   |    Where to |
|   |  Get Your   |
|   | Medications |
|   |             |
|   | Information |
|   |  about      |
|   | where to    |
|   | get these   |
|   | medications |
|   |  is not yet |
|   |  available  |
|   |  Ask your   |
|   | nurse or    |
 
|   | doctor      |
|   | about these |
|   |             |
|   | medications |
|   |             |
|   | LORazepam 1 |
|   |  MG tablet  |
|   | Disposition |
|   | :           |
|   | HomeConditi |
|   | on:         |
|   | StableCode  |
|   | Status:     |
|   | Full        |
|   | CodeDischar |
|   | ge took 35  |
|   | minutes, to |
|   |  include    |
|   | final       |
|   | examination |
|   | ,           |
|   | discussion  |
|   | of          |
|   | admission,  |
|   | and         |
|   | preparation |
|   |  of         |
|   | prescriptio |
|   | ns,         |
|   | instruction |
|   | s for       |
|   | on-going    |
|   | care,       |
|   | follow-up   |
|   | and         |
|   | documentati |
|   | on of       |
|   | discharge   |
|   | summary.[facility] |
|   | TT          |
|   | SANNA, |
|   |  MD2:09 PM  |
+---+-------------+
 
 
 
+--------+-------------+---+----------------------+----------------------+
| / | Emergency   |   |   Chau Deleon,  |                      |
| 2019   |             |   | MD                   |                      |
+--------+-------------+---+----------------------+----------------------+
| / | Office      |   |   Kirt Mclaughlin MD     | PAD (peripheral      |
2019   | Visit       |   |                      | artery disease)      |
|        |             |   |                      | (HCC) (Primary Dx);  |
|        |             |   |                      | ESRD (end stage      |
|        |             |   |                      | renal disease) (formerly [facility]) |
+--------+-------------+---+----------------------+----------------------+
| / | Telephone   |   |   Kristen Tam, |                      |
2019   |             |   |  RN                  |                      |
+--------+-------------+---+----------------------+----------------------+
| / | Telephone   |   |   Srinivasan Abdi,  | Follow-up            |
 
2019   |             |   | DPM                  | (appointment)        |
+--------+-------------+---+----------------------+----------------------+
/ | Orders Only |   |   Luisa Segovia   |                      |
2019   |             |   | CHELSEY TAN                |                      |
+--------+-------------+---+----------------------+----------------------+
/ | Hospital    |   |   Connie        | JASMYND (peripheral      |
|  - | Encounter   |   | MD Liat Jarrett, | vascular disease)    |
|        |             |   |  Moiz Porter MD    | (formerly [facility]) (Primary Dx);  |
|              |   |                      | ESRD (end stage      |
|    |             |   |                      | renal disease) on    |
|        |             |   |                      | dialysis (formerly [facility]);      |
|        |             |   |                      | Anemia in ESRD       |
|        |             |   |                      | (end-stage renal     |
|        |             |   |                      | disease) (formerly [facility]);      |
|        |             |   |                      | Hypoalbuminemia;     |
|        |             |   |                      | Electrolyte          |
|        |             |   |                      | imbalance risk       |
+--------+-------------+---+----------------------+----------------------+
 
 
 
+---+-------------+
|   |   Discharge |
|   |  Summaries  |
|   | - Lyn,  |
|   | Prudence,     |
|   | MD-R2 -     |
|   | 2019  |
|   |  1:04 PM    |
|   | PST         |
|   | Formatting  |
|   | of this     |
|   | note may be |
|   |  different  |
|   | from the    |
|   | original.[facility] |
|   | [facility]        |
|   | [facility]    |
|   [facility]     |
|   [facility] |
|   | ce:         |
|   | Hospitalist |
|   | Resident    |
|   | Discharge   |
|   | SummaryDate |
|   |  of         |
|   | Admission:  |
|   |             |
|   | 2019Da |
|   | te of       |
|   | Discharge:  |
|   |             |
|   | 20191/ |
|   | Disc |
|   | harge       |
|   | Provider:   |
|   | Prudence      |
|   | Lyn    |
|   | [id number] |
|   | ent Team:   |
 
|   | Consulting  |
|   | Physician:  |
|   | Srinivasan APONTE     |
|   | Julian,    |
|   | DPMConsulti |
|   | ng          |
|   | Physician:  |
|   | Ethan      |
|   | Delmi,     |
|   | MDAdmitting |
|   |  Provider:  |
|   | Luiza      |
|   | Collingham, |
|   |             |
|   | MDDischarge |
|   |  Diagnoses: |
|   |             |
|   | Principal   |
|   | Problem:    |
|   | PVD         |
|   | (peripheral |
|   |  vascular   |
|   | disease)    |
|   | (HCC)Active |
|   |  Problems:  |
|   |  ESRD (end  |
|   | stage renal |
|   |  disease)   |
|   | (HCC)  Type |
|   |  2 diabetes |
|   |  mellitus   |
|   | with        |
|   | chronic     |
|   | kidney      |
|   | disease on  |
|   | chronic     |
|   | dialysis,   |
|   | with        |
|   | long-term   |
|   | current use |
|   |  of insulin |
|   |  (HCC)      |
|   | Anemia in   |
|   | ESRD        |
|   | (end-stage  |
|   | renal       |
|   | disease)    |
|   | (HCC)       |
|   | Hypertensio |
|   | n,          |
|   | essential   |
|   | Chronic     |
|   | systolic    |
|   | congestive  |
|   | heart       |
|   | failure     |
|   | (HCC)  COPD |
|   |  (chronic   |
|   | obstructive |
|   |  pulmonary  |
 
|   | disease)    |
|   | (HCC)       |
|   | Paroxysmal  |
|   | atrial      |
|   | fibrillatio |
|   | n (HCC)     |
|   | CAD         |
|   | (coronary   |
|   | artery      |
|   | disease)Res |
|   | olved       |
|   | Problems:   |
|   | * No        |
|   | resolved    |
|   | hospital    |
|   | problems.   |
|   | *BRIEF      |
|   | HISTORY OF  |
|   | PRESENTATIO |
|   | N:          |
|   | Patient     |
|   | Summary:    |
|   | Per Dr.     |
|   | Collingham' |
|   | s H and P   |
|   | from        |
|   | 2019:  |
|   | '67 y/o F   |
|   | with h/o    |
|   | ESRD on HD  |
|   | T,TH,Sat    |
|   | (Dr.        |
|   | Akoum),     |
|   | DM2, PAD    |
|   | s/p         |
|   | angioplasty |
|   |  of LLE and |
|   |             |
|   | transmetata |
|   | rsal        |
|   | amputation  |
|   | of left     |
|   | foot        |
|   | 18 by |
|   |  Dr.        |
|   | Abdi due |
|   |  to         |
|   | osteomyelit |
|   | is, CAD,    |
|   | s/p MI,     |
|   | CABG, AVR   |
|   | 2/18, HFrEF |
|   |  with last  |
|   | EF 20 to    |
|   | 25%, s/p    |
|   | AICD, AF on |
|   |  chronic    |
|   | anticoagula |
|   | tion who    |
|   | was sent to |
 
|   |  ED by   |
|   | [facility] for    |
|   | evaluation  |
|   | of right    |
|   | LE.         |
|   |   Patient   |
|   | reports     |
|   | that at HD  |
|   | yesterday   |
|   | it was      |
|   | noted that  |
|   | her right   |
|   | toes were   |
|   | red and     |
|   | dusky.      |
|   |   She went  |
|   | to see   |
|   | [facility] this   |
|   | morning and |
|   |  he was     |
|   | concerned   |
|   | that right  |
|   | toes could  |
|   | be ischemic |
|   |  or         |
|   | infected    |
|   | and         |
|   | referred to |
|   |  ED.        |
|   | 'HOSPITAL   |
|   | COURSE:     |
|   | Vascular    |
|   | surgery was |
|   |  consulted  |
|   | from the    |
|   | emergency   |
|   | department, |
|   |  they did a |
|   |  selective  |
|   | catheteriza |
|   | tion of the |
|   |  left       |
|   | common      |
|   | femoral     |
|   | artery and  |
|   | placed an   |
|   | SMA stent.  |
|   | The patient |
|   |  tolerated  |
|   | the         |
|   | procedure   |
|   | well.       |
|   | Podiatry    |
|   | was         |
|   | consulted,  |
|   | they        |
|   | recommended |
|   |  wound care |
|   |             |
|   | consultatio |
 
|   | n for the   |
|   | open wound  |
|   | on her left |
|   |  foot. They |
|   |  also       |
|   | stated that |
|   |  her right  |
|   | foot did    |
|   | not need a  |
|   | procedure   |
|   | for the     |
|   | toes at     |
|   | this time.  |
|   | Infectious  |
|   | disease is  |
|   | waiting for |
|   |  blood      |
|   | cultures    |
|   | and Gram    |
|   | stain and   |
|   | culture of  |
|   | the wound   |
|   | site to     |
|   | narrow her  |
|   | IV          |
|   | antibiotics |
|   | . She       |
|   | reported    |
|   | improved    |
|   | pain, no    |
|   | shortness   |
|   | of breath,  |
|   | no nausea   |
|   | and         |
|   | vomiting,   |
|   | she is      |
|   | making a    |
|   | small       |
|   | amount of   |
|   | urine and   |
|   | had a bowel |
|   |  movement   |
|   | today. She  |
|   | would like  |
|   | to go home. |
|   |  Physical   |
|   | therapy     |
|   | cleared her |
|   |  to go home |
|   |  with home  |
|   | assist.     |
|   | Nephrology  |
|   | was         |
|   | consulted   |
|   | and         |
|   | reported    |
|   | that they   |
|   | were        |
|   | amenable to |
|   |             |
 
|   | administeri |
|   | ng her IV   |
|   | antibiotics |
|   |  with       |
|   | dialysis.   |
|   | Infectious  |
|   | disease was |
|   |  consulted  |
|   | and agreed  |
|   | to this     |
|   | plan. Upon  |
|   | discharge   |
|   | the patient |
|   |  was        |
|   | eating,     |
|   | drinking,   |
|   | urinating,  |
|   | having      |
|   | bowel       |
|   | movements.s |
|   | he was not  |
|   | having      |
|   | fevers,     |
|   | nausea, or  |
|   | vomiting.   |
|   | No          |
|   | prescriptio |
|   | ns prior to |
|   |  admission. |
|   |  DISCHARGE  |
|   | EXAMVital   |
|   | Signs:BP    |
|   | 120/56 (BP  |
|   | Location:   |
|   | Right upper |
|   |  arm)  |    |
|   | Pulse 64  | |
|   |  Temp 97.5  |
|   |   F (36.4   |
|   |   C)        |
|   | (Axillary)  |
|   |  | Resp 18  |
|   |  | Ht 1.778 |
|   |  m (5' 10") |
|   |   | Wt 65.5 |
|   |  kg (144 lb |
|   |  6.4 oz)  | |
|   |  SpO2 97%   |
|   | |           |
|   | Breastfeedi |
|   | ng? No  |   |
|   | BMI 20.72   |
|   | kg/m        |
|   | Physical    |
|   | ExamGeneral |
|   |             |
|   | Appearance: |
|   |  Alert and  |
|   | cooperative |
|   | , sitting   |
 
|   | up in a     |
|   | chair by    |
|   | the bed and |
|   |  appears to |
|   |  be in no   |
|   | acute       |
|   | distress.   |
|   |   HEENNT:   |
|   | Normocephal |
|   | ic and      |
|   | atraumatic. |
|   |  Hearing    |
|   | grossly     |
|   | intact. No  |
|   | nasal       |
|   | discharge.  |
|   | No tracheal |
|   |  deviation. |
|   |             |
|   |   Cardiovas |
|   | cular:      |
|   | Rhythm and  |
|   | rate are    |
|   | regular.    |
|   | 2/6         |
|   | systolic    |
|   | murmur      |
|   | heard best  |
|   | over the    |
|   | left upper  |
|   | sternal     |
|   | border. No  |
|   | gallops or  |
|   | rubs        |
|   | appreciated |
|   | .           |
|   | Peripheral  |
|   | pulses 2+   |
|   | on the      |
|   | right. No   |
|   | lower       |
|   | extremity   |
|   | edema.  Pul |
|   | monary/Ches |
|   | t: Lungs    |
|   | are clear   |
|   | to          |
|   | auscultatio |
|   | n           |
|   | bilaterally |
|   | . Effort is |
|   |  normal.    |
|   | Normal      |
|   | breath      |
|   | sounds.     |
|   |   Abdominal |
|   | : Soft,     |
|   | nontender,  |
|   | nondistende |
|   | d.          |
 
|   | Normoactive |
|   |  bowel      |
|   | sounds. No  |
|   | guarding or |
|   |  rebound    |
|   | tenderness. |
|   |             |
|   |   Musculosk |
|   | eletal:     |
|   | Normal      |
|   | range of    |
|   | motion.     |
|   | Normal      |
|   | muscular    |
|   | development |
|   | . Patient   |
|   | with        |
|   | forefoot    |
|   | amputation  |
|   | on the      |
|   | left. Left  |
|   | foot        |
|   | wrapped in  |
|   | new         |
|   | dressing,   |
|   | C/D/I.      |
|   | Right foot  |
|   | with        |
|   | erythema    |
|   | over the    |
|   | great big   |
|   | toe and     |
|   | cyanosis    |
|   | over the    |
|   | second toe, |
|   |  improved   |
|   | from prior. |
|   |             |
|   | Neurologica |
|   | l: CN       |
|   | II-XII      |
|   | grossly     |
|   | intact.     |
|   | Oriented to |
|   |  person,    |
|   | place, and  |
|   | time.       |
|   |   Skin:     |
|   | Skin is     |
|   | warm and    |
|   | dry. No     |
|   | rash noted. |
|   |             |
|   |   Psychiatr |
|   | ic:The      |
|   | patient was |
|   |  able to    |
|   | demonstrate |
|   |  good       |
|   | judgement   |
 
|   | and reason, |
|   |  without    |
|   | hallucinati |
|   | ons,        |
|   | abnormal    |
|   | affect or   |
|   | abnormal    |
|   | behaviors   |
|   | during the  |
|   | examination |
|   | .           |
|   | DATARecent  |
|   | LabsLab     |
|   |  |
|   | 8           |
|   |  |
|   | 3           |
|   |  |
|   | 3 WBC 3.90  |
|   | 3.39* 5.53  |
|   | HGB 9.8*    |
|   | 9.5* 9.4*   |
|   | HCT 30.1*   |
|   | 29.2* 28.4* |
|   |  *   |
|   | 125* 147*   |
|   | NEUTOPHILPC |
|   | T  --       |
|   | 66.95  --   |
|   | MONOPCT  -- |
|   |   11.96  -- |
|   |   Recent    |
|   | LabsLab     |
|   |  |
|   | 8           |
|   |  |
|   | 3           |
|   |  |
|   | 3     |
|   | 140 139 K   |
|   | 3.9 4.2 4.0 |
|   |  CL 97* 101 |
|   |  100 CO2 31 |
|   |  28 29 BUN  |
|   | 13 26* 23   |
|   | CREATININE  |
|   | 4.8* 6.8*   |
|   | 6.1* PROT   |
|   | --   --     |
|   | 6.2*        |
|   | BILITOT  -- |
|   |    --  0.4  |
|   | ALT  --     |
|   | --  21 AST  |
|   |  --   --    |
|   | 24          |
|   | Phosphorus: |
|   |   Lab       |
|   | Results     |
|   | Component   |
 
|   | Value Date  |
|   |  PHOS 3.6   |
|   | 2019  |
|   | Invalid     |
|   | input(s):   |
|   | LABALBURece |
|   | nt LabsLab  |
|   |  |
|   | 8 MG 2.3    |
|   | Recent      |
|   | LabsLab     |
|   |  |
|   | 3 CKTOTAL   |
|   | 28*         |
|   | Intake/Outp |
|   | ut Summary  |
|   | (Last 24    |
|   | hours) at   |
|   | 19    |
|   | 1717Last    |
|   | data filed  |
|   | at 19 |
|   |  0850 Gross |
|   |  per 24     |
|   | hour Intake |
|   |             |
|   |    380 ml   |
|   | Output      |
|   |             |
|   | 0 ml Net    |
|   |             |
|   | 380 ml      |
|   | Microbiolog |
|   | y: Blood    |
|   | cultures -  |
|   | NGTDRadiolo |
|   | gyUs Lower  |
|   | Extremty    |
|   | Arterial    |
|   | RightResult |
|   |  Date:      |
|   | . |
|   |  Right leg  |
|   | shows       |
|   | biphasic    |
|   | inflow with |
|   |  a greater  |
|   | than 50%    |
|   | stenosis at |
|   |  the origin |
|   |  of the     |
|   | superficial |
|   |  femoral    |
|   | artery      |
|   | (137-309).  |
|   |  Biphasic   |
|   | outflow. 2. |
|   |  Two vessel |
|   |  runoff to  |
|   | the right   |
 
|   | foot.       |
|   | Signed by:  |
|   | Iuliano,    |
|   | Edward Sign |
|   |  Date/Time: |
|   |  2019 |
|   |  3:11       |
|   | PMPLANDispo |
|   | sition:     |
|   | HomeConditi |
|   | on:         |
|   | StableCode  |
|   | Status:     |
|   | Full CodeNo |
|   |  discharge  |
|   | procedures  |
|   | on          |
|   | file.Follow |
|   |  up:[facility]  |
|   | [facility]      |
|   | [facility]    |
|   | [id number] |
|   |  [facility]   |
|   | Dr Nhan      |
|   | [place]   |
|   | [place]  |
|   | [id number] |
|   | [phone] |
|   | Follow up   |
|   | in 3        |
|   | week(s)[address]ued[address] |
|   | [address],   |
|   |  [address]     |
|   | [address],   |
|   | [place]         |
|   | [address] |
|   | [place]        |
|   | [id number] |
|   | [phone] |
|   | [address],    |
|   |  [address]     |
|   | [address],   |
|   | [place]         |
|   | [address] |
|   | [place]        |
|   | [id number] |
|   | [phone] 1  |
|   | weekJimmy   |
|   | [facility],       |
|   | [id number]    |
|   | [address]  |
|   | [address] |
|   | c[place]       |
|   | [phone] |
|   | [phone]  |
|   | office for  |
|   | appointment |
|   |  Medication |
|   |  List       |
|   | START       |
 
|   | taking      |
|   | these       |
|   | medications |
|   |             |
|   | cefTAZidime |
|   |  2 g        |
|   | injectionQT |
|   | Y:  1       |
|   | eachRefills |
|   | :           |
|   | 0Commonly   |
|   | known as:   |
|   | FORTAZInjec |
|   | t 2 g into  |
|   | the muscle  |
|   | After       |
|   | Hemodialysi |
|   | s (see      |
|   | admin       |
|   | instruction |
|   | s) for 7    |
|   | days.       |
|   | vancomycin  |
|   | IVPBRefills |
|   | :           |
|   | 0Commonly   |
|   | known as:   |
|   | VANCOCINInj |
|   | ect 750 mg  |
|   | into the    |
|   | vein After  |
|   | Hemodialysi |
|   | s (see      |
|   | admin       |
|   | instruction |
|   | s) for 7    |
|   | days.       |
|   | Dilute in   |
|   | appropriate |
|   |  volume of  |
|   | IV fluid    |
|   | and give by |
|   |  slow IV    |
|   | infusion.   |
|   | CHANGE how  |
|   | you take    |
|   | these       |
|   | medications |
|   |   losartan  |
|   | 25 MG       |
|   | tabletQTY:  |
|   |  30         |
|   | tabletRefil |
|   | ls:         |
|   | 0Commonly   |
|   | known as:   |
|   | COZAARTake  |
|   | 1 tablet by |
|   |  mouth      |
|   | daily.What  |
 
|   | changed:    |
|   | how much to |
|   |  take       |
|   | CONTINUE    |
|   | taking      |
|   | these       |
|   | medications |
|   |   *         |
|   | albuterol   |
|   | 108 (90     |
|   | Base)       |
|   | MCG/ACT     |
|   | inhalerRefi |
|   | lls:        |
|   | 0Commonly   |
|   | known as:   |
|   | PROVENTIL   |
|   | HFA;VENTOLI |
|   | N HFA *     |
|   | VENTOLIN    |
|   |  (90 |
|   |  Base)      |
|   | MCG/ACT     |
|   | inhalerRefi |
|   | lls:        |
|   | 0Generic    |
|   | drug:       |
|   | albuterol   |
|   | apixaban    |
|   | 2.5 MG      |
|   | tabletQTY:  |
|   |  60         |
|   | tabletRefil |
|   | ls:         |
|   | 0Commonly   |
|   | known as:   |
|   | ELIQUISTake |
|   |  1 tablet   |
|   | by mouth 2  |
|   | (two) times |
|   |  daily.     |
|   | atorvastati |
|   | n 40 MG     |
|   | tabletQTY:  |
|   |  30         |
|   | tabletRefil |
|   | ls:         |
|   | 0Commonly   |
|   | known as:   |
|   | LIPITORTake |
|   |  1 tablet   |
|   | by mouth    |
|   | nightly.    |
|   | bisacodyl   |
|   | 10 MG       |
|   | suppository |
|   | Refills:    |
|   | 0Commonly   |
|   | known as:   |
|   | DULCOLAX    |
 
|   | calcium     |
|   | acetate 667 |
|   |  MG         |
|   | capsuleRefi |
|   | lls:        |
|   | 0Commonly   |
|   | known as:   |
|   | PHOSLO      |
|   | carvedilol  |
|   | 12.5 MG     |
|   | tabletQTY:  |
|   |  60         |
|   | tabletRefil |
|   | ls:         |
|   | 0Doctor's   |
|   | comments:   |
|   | Hold for    |
|   | SBP less    |
|   | than        |
|   | 100Hold for |
|   |  HR less    |
|   | than        |
|   | 60Commonly  |
|   | known as:   |
|   | COREGTake 1 |
|   |  tablet by  |
|   | mouth 2     |
|   | (two) times |
|   |  daily with |
|   |  meals.     |
|   | clopidogrel |
|   |  75 MG      |
|   | tabletQTY:  |
|   |  30         |
|   | tabletRefil |
|   | ls:         |
|   | 11Commonly  |
|   | known as:   |
|   | PLAVIXTake  |
|   | 1 tablet by |
|   |  mouth      |
|   | daily.      |
|   | esomeprazol |
|   | e 20 MG     |
|   | capsuleRefi |
|   | lls:        |
|   | 0Commonly   |
|   | known as:   |
|   | NEXIUM      |
|   | HYDROcodone |
|   | -acetaminop |
|   | hen 5-325   |
|   | MG per      |
|   | tabletQTY:  |
|   |  30         |
|   | tabletRefil |
|   | ls:         |
|   | 0Commonly   |
|   | known as:   |
|   | NORCOTake 1 |
 
|   |  tablet by  |
|   | mouth every |
|   |  4 (four)   |
|   | hours as    |
|   | needed.     |
|   | insulin     |
|   | aspart 100  |
|   | UNIT/ML     |
|   | injectionRe |
|   | fills:      |
|   | 0Commonly   |
|   | known as:   |
|   | NOVOLOG     |
|   | insulin     |
|   | degludec    |
|   | 100 UNIT/ML |
|   |             |
|   | injectionRe |
|   | fills:      |
|   | 0Commonly   |
|   | known as:   |
|   | TRESIBA     |
|   | isosorbide  |
|   | mononitrate |
|   |  60 MG 24   |
|   | hr          |
|   | tabletQTY:  |
|   |  30         |
|   | tabletRefil |
|   | ls:  1Take  |
|   | 1 tablet by |
|   |  mouth      |
|   | daily.      |
|   | loperamide  |
|   | 2 MG        |
|   | capsuleRefi |
|   | lls:        |
|   | 0Commonly   |
|   | known as:   |
|   | IMODIUM     |
|   | LORazepam 1 |
|   |  MG         |
|   | tabletQTY:  |
|   |  30         |
|   | tabletRefil |
|   | ls:         |
|   | 0Commonly   |
|   | known as:   |
|   | ATIVANTake  |
|   | 1 tablet by |
|   |  mouth      |
|   | every 8     |
|   | (eight)     |
|   | hours as    |
|   | needed for  |
|   | Anxiety.    |
|   | nitroGLYCER |
|   | IN 0.4 MG   |
|   | SL          |
|   | tabletQTY:  |
 
|   |  30         |
|   | tabletRefil |
|   | ls:         |
|   | 0Doctor's   |
|   | comments:   |
|   | Hold for    |
|   | SBP less    |
|   | than        |
|   | 100Commonly |
|   |  known as:  |
|   |             |
|   | NITROSTATPl |
|   | ace 1       |
|   | tablet      |
|   | under the   |
|   | tongue      |
|   | every 5     |
|   | (five)      |
|   | minutes as  |
|   | needed for  |
|   | Chest pain. |
|   |             |
|   | nortriptyli |
|   | ne 25 MG    |
|   | capsuleRefi |
|   | lls:        |
|   | 0Commonly   |
|   | known as:   |
|   | PAMELOR     |
|   | ondansetron |
|   |  4 MG       |
|   | disintegrat |
|   | ing         |
|   | tabletRefil |
|   | ls:         |
|   | 0Commonly   |
|   | known as:   |
|   | ZOFRAN-ODT  |
|   | oxybutynin  |
|   | 5 MG        |
|   | tabletRefil |
|   | ls:         |
|   | 0Commonly   |
|   | known as:   |
|   | DITROPAN    |
|   | prochlorper |
|   | azine 5 MG  |
|   | tabletQTY:  |
|   |  60         |
|   | tabletRefil |
|   | ls:         |
|   | 11Doctor's  |
|   | comments:   |
|   | Please      |
|   | consider 90 |
|   |  day        |
|   | supplies to |
|   |  promote    |
|   | better      |
|   | adherenceTA |
 
|   | KE ONE      |
|   | TABLET BY   |
|   | MOUTH TWICE |
|   |  DAILY AS   |
|   | NEEDED FOR  |
|   | NAUSEA      |
|   | ranitidine  |
|   | 150 MG      |
|   | tabletRefil |
|   | ls:         |
|   | 0Commonly   |
|   | known as:   |
|   | ZANTAC      |
|   | rOPINIRole  |
|   | 0.25 MG     |
|   | tabletRefil |
|   | ls:         |
|   | 0Commonly   |
|   | known as:   |
|   | REQUIP      |
|   | SLOW-MAG    |
|   | 71.5-119 MG |
|   |             |
|   | TbecRefills |
|   | :  0Generic |
|   |  drug:      |
|   | Magnesium   |
|   | Cl-Calcium  |
|   | Carbonate   |
|   | Vitamin D3  |
|   | 5000 units  |
|   | CapsRefills |
|   | :  0        |
|   | Vortioxetin |
|   | e HBr 10 MG |
|   |             |
|   | TabsRefills |
|   | :  0  *     |
|   | This list   |
|   | has 2       |
|   | medication( |
|   | s) that are |
|   |  the same   |
|   | as other    |
|   | medications |
|   |  prescribed |
|   |  for you.   |
|   | Read the    |
|   | directions  |
|   | carefully,  |
|   | and ask     |
|   | your doctor |
|   |  or other   |
|   | care        |
|   | provider to |
|   |  review     |
|   | them with   |
|   | you.    You |
|   |  might also |
|   |  be taking  |
 
|   | other       |
|   | medications |
|   |  not listed |
|   |  above. If  |
|   | you have    |
|   | questions   |
|   | about any   |
|   | of your     |
|   | other       |
|   | medications |
|   | , talk to   |
|   | the person  |
|   | who         |
|   | prescribed  |
|   | them or     |
|   | your        |
|   | Primary     |
|   | Care        |
|   | Provider.   |
|   |   STOP      |
|   | taking      |
|   | these       |
|   | medications |
|   |   aspirin   |
|   | 81 MG EC    |
|   | tablet      |
|   | Where to    |
|   | Get Your    |
|   | Medications |
|   |             |
|   | Information |
|   |  about      |
|   | where to    |
|   | get these   |
|   | medications |
|   |  is not yet |
|   |  available  |
|   |  Ask your   |
|   | nurse or    |
|   | doctor      |
|   | about these |
|   |             |
|   | medications |
|   |             |
|   | cefTAZidime |
|   |  2 g        |
|   | injection   |
|   |  vancomycin |
|   |  IVPB       |
|   | Prudence      |
|   | Lyn,    |
|   | MD-[id number]/[id number]/2 |
|   | 0195:17 PM  |
+---+-------------+
 
 
 
+--------+-------------+---+----------------------+----------------------+
| / | Procedure   |   |                      |                      |
|    | Pass        |   |                      |                      |
 
+--------+-------------+---+----------------------+----------------------+
| / | Procedure   |   |                      |                      |
|    | Pass        |   |                      |                      |
+--------+-------------+---+----------------------+----------------------+
| / | Procedure   |   |                      |                      |
|    | Pass        |   |                      |                      |
+--------+-------------+---+----------------------+----------------------+
| / | Orders Only |   |   Jessica Marley,   |                      |
| 2019   |             |   | RDMS                 |                      |
+--------+-------------+---+----------------------+----------------------+
| / | Procedure   |   |                      |                      |
|    | Pass        |   |                      |                      |
+--------+-------------+---+----------------------+----------------------+
| / | Refill      |   |   João Asher MD  |                      |
|    |             |   |                      |                      |
+--------+-------------+---+----------------------+----------------------+
| / | Telephone   |   |   Srinivasan Abdi,  | Medication Problem   |
|    |             |   | DPM                  | (Requesting Rx for   |
|        |             |   |                      | Wheelchair)          |
+--------+-------------+---+----------------------+----------------------+
| / | Emergency   |   |   Nathaniel De Luna S, | Hx of CABG (Primary  |
|  - |             |   |  DO  [facility],  | Dx); Chest pain in   |
|        |             |   | DO  Silas Ferrara MD  | adult; S/P MVR       |
| / |             |   |                      | (mitral valve        |
|    |             |   |                      | repair); Elevated    |
|        |             |   |                      | blood pressure       |
|        |             |   |                      | reading; Chronic     |
|        |             |   |                      | systolic congestive  |
|        |             |   |                      | heart failure (HCC); |
|        |             |   |                      |  Chest pain,         |
|        |             |   |                      | unspecified type     |
+--------+-------------+---+----------------------+----------------------+
 
 
 
+---+-------------+
|   |   Discharge |
|   |  Summaries  |
|   | - Alem,    |
|   | Arwa, MD -  |
|   | 2019  |
|   |  8:20 AM    |
|   | PST         |
|   | Formatting  |
|   | of this     |
|   | note may be |
|   |  different  |
|   | from the    |
|   | original.[facility] |
|   | [facility]        |
|   [facility]    |
|   [facility]     |
|   [facility] |
|   | ce:         |
|   | Hospitalist |
|   | Discharge   |
|   | SummaryDate |
|   |  of         |
|   | Admission:  |
|   |             |
 
|   | 2019Da |
|   | te of       |
|   | Discharge:  |
|   |             |
|   | 2019Di |
|   | scharge     |
|   | Physician:  |
|   |  Arwa Z     |
|   | Alem,      |
|   | MDTreatment |
|   |  Team:      |
|   | Admitting   |
|   | Provider:   |
|   | Negro A      |
|   | Brown,      |
|   | DODischarge |
|   |  Diagnoses: |
|   |             |
|   | Principal   |
|   | Problem:    |
|   | Chest       |
|   | painActive  |
|   | Problems:   |
|   | Coronary    |
|   | artery      |
|   | disease     |
|   | involving   |
|   | native      |
|   | coronary    |
|   | artery of   |
|   | native      |
|   | heart       |
|   | without     |
|   | angina      |
|   | pectoris    |
|   | ESRD (end   |
|   | stage renal |
|   |  disease)   |
|   | (HCC)  Type |
|   |  2 diabetes |
|   |  mellitus   |
|   | with        |
|   | chronic     |
|   | kidney      |
|   | disease on  |
|   | chronic     |
|   | dialysis,   |
|   | with        |
|   | long-term   |
|   | current use |
|   |  of insulin |
|   |  (HCC)      |
|   | Anemia in   |
|   | ESRD        |
|   | (end-stage  |
|   | renal       |
|   | disease)    |
|   | (HCC)       |
|   | Hypertensio |
|   | n,          |
 
|   | essential   |
|   | Pleural     |
|   | effusion    |
|   | Chronic     |
|   | systolic    |
|   | congestive  |
|   | heart       |
|   | failure     |
|   | (HCC)       |
|   | Chronic     |
|   | diarrhea    |
|   | Chronic     |
|   | anticoagula |
|   | tion        |
|   | Cardiomyopa |
|   | thy (HCC)   |
|   | COPD        |
|   | (chronic    |
|   | obstructive |
|   |  pulmonary  |
|   | disease)    |
|   | (HCC)  ICD  |
|   | (implantabl |
|   | e           |
|   | cardioverte |
|   | r-defibrill |
|   | ator) in    |
|   | place       |
|   | Paroxysmal  |
|   | atrial      |
|   | fibrillatio |
|   | n (HCC)     |
|   | Chronic     |
|   | multifocal  |
|   | osteomyelit |
|   | is of left  |
|   | foot (HCC)  |
|   |  PVD        |
|   | (peripheral |
|   |  vascular   |
|   | disease)    |
|   | (HCC)Resolv |
|   | ed          |
|   | Problems:   |
|   | * No        |
|   | resolved    |
|   | hospital    |
|   | problems.   |
|   | *Procedures |
|   | :  * No     |
|   | surgery     |
|   | found       |
|   | *Significan |
|   | t           |
|   | Diagnostic  |
|   | Studies:    |
|   | No results  |
|   | found.BRIEF |
|   |  HISTORY OF |
|   |             |
 
|   | PRESENTATIO |
|   | N:          |
|   | Cherie       |
|   | Cinthya     |
|   | Wilfredo is a |
|   |  68 y.o.    |
|   | female who  |
|   | presented   |
|   | per         |
|   | H&P"from    |
|   | [facility] |
|   |  because of |
|   |  chest pain |
|   |  that       |
|   | started     |
|   | last night  |
|   | about       |
|   | midnight    |
|   | and lasted  |
|   | until about |
|   |  5 AM this  |
|   | morning,    |
|   | pressure,   |
|   | intense,    |
|   | nonradiatin |
|   | g, with     |
|   | nausea and  |
|   | dyspnea, no |
|   |  sweating.  |
|   | Improved    |
|   | with SL NTG |
|   |  initially, |
|   |  but the    |
|   | NTG's       |
|   | became less |
|   |  effective  |
|   | as she took |
|   |  more of    |
|   | them (5     |
|   | total). Got |
|   |  a NTG      |
|   | patch at    |
|   | [facility]        |
|   | [facility] |
|   |  which was  |
|   | very        |
|   | effective   |
|   | in          |
|   | controlling |
|   |  the pain.  |
|   | She has     |
|   | very        |
|   | complicated |
|   |  cardiac    |
|   | history     |
|   | including   |
|   | CAD, CABG,  |
|   | systolic    |
|   | CHF,        |
|   | ischemic    |
 
|   | cardiomyopa |
|   | thy,        |
|   | pacemaker,  |
|   | valve       |
|   | repair,     |
|   | afib,       |
|   | anticoagula |
|   | tion, with  |
|   | several     |
|   | other       |
|   | comorbiditi |
|   | es          |
|   | including   |
|   | COPD, ESRD  |
|   | on HD, DM2, |
|   |  PVD. She   |
|   | has a       |
|   | cardiologis |
|   | t in [place]  [place]   [place] but   |
|   | [facility]        |
|   | [facility]    |
|   | refused to  |
|   | call that   |
|   | doctor to   |
|   | aid in      |
|   | decision-ma |
|   | nichelle and    |
|   | simply      |
|   | transferred |
|   |  the        |
|   | patient to  |
|   | [facility]      |
|   | ED.  The    |
|   | patient was |
|   |  at [facility]  |
|   | a month ago |
|   |  for L foot |
|   |  toe        |
|   | amputation  |
|   | due to      |
|   | osteomyelit |
|   | is. Dr      |
|   | Abdi is  |
|   | her         |
|   | surgeon.    |
|   | During that |
|   |             |
|   | hospitaliza |
|   | tion she    |
|   | c/o chest   |
|   | pain with   |
|   | troponin    |
|   | elevation   |
|   | to 0.318    |
|   | and was     |
|   | evaluated   |
|   | by          |
|   | cardiology. |
|   |  Stress     |
 
|   | test a      |
|   | month prior |
|   |  had been   |
|   | normal. It  |
|   | was decided |
|   |  that       |
|   | medical     |
|   | management  |
|   | was the     |
|   | only        |
|   | reasonable  |
|   | option for  |
|   | the         |
|   | patient,    |
|   | given her   |
|   | extensive   |
|   | comorbiditi |
|   | es.  Post-a |
|   | mputation   |
|   | she has     |
|   | been        |
|   | instructed  |
|   | to be       |
|   | strict NWB  |
|   | on LLE, and |
|   |  she was    |
|   | sent to     |
|   | [facility]     |
|   | [facility]. She    |
|   | remains on  |
|   | abx         |
|   | post-proced |
|   | ure managed |
|   |  by Dr      |
|   | Earl. The   |
|   | patient is  |
|   | convinced   |
|   | that the    |
|   | SNF is not  |
|   | administeri |
|   | ng her meds |
|   |  correctly. |
|   |  She thinks |
|   |  they might |
|   |  be         |
|   | skipping    |
|   | her         |
|   | Eliquis,    |
|   | clopidogrel |
|   | , and       |
|   | carvedilol. |
|   |  This       |
|   | happened    |
|   | before,     |
|   | earlier in  |
|   | 2018. She   |
|   | says the    |
|   | nurses      |
|   | chart that  |
|   | the meds    |
 
|   | are given   |
|   | but they    |
|   | are not     |
|   | given. She  |
|   | does not    |
|   | want to     |
|   | return to   |
|   | [facility], if |
|   |  at all     |
|   | possible,   |
|   | and wants   |
|   | to return   |
|   | home with   |
|   | paid        |
|   | caregiver   |
|   | if that     |
|   | will comply |
|   |  with Dr    |
|   | Abdi's   |
|   | post-op     |
|   | instruction |
|   | s"HOSPITAL  |
|   | COURSE:     |
|   | She was     |
|   | seen by     |
|   |  and |
|   |  he recc to |
|   |  stop all   |
|   | IV abx.She  |
|   | was         |
|   | discharged  |
|   | home with   |
|   | full time   |
|   | care giver. |
|   |  She was    |
|   | seen by     |
|   |  |
|   |  and he     |
|   | recc        |
|   | medical     |
|   | management  |
|   | Patient was |
|   |  discharged |
|   |  in stable  |
|   | condition.  |
|   | Addressed   |
|   | all of      |
|   | their       |
|   | questions   |
|   | and covered |
|   |  with side  |
|   | affects of  |
|   | newly added |
|   |             |
|   | medications |
|   | . she was   |
|   | cleared per |
|   |  consulting |
|   |             |
|   | services.Pa |
 
|   | st Medical  |
|   | History     |
|   | Diagnosis   |
|   | Date        |
|   |   Acute MI  |
|   | (HCC)   |
|   |  with       |
|   | stenting x  |
|   | 1           |
|   |   Anemia    |
|   | associated  |
|   | with        |
|   | chronic     |
|   | renal       |
|   | failure     |
|   | 2016   |
|   |             |
|   |   Atrial    |
|   | fibrillatio |
|   | n (HCC)     |
|   |   Chronic   |
|   | kidney      |
|   | disease     |
|   |             |
|   | Congestive  |
|   | heart       |
|   | failure     |
|   | (HCC)       |
|   |   COPD      |
|   | (chronic    |
|   | obstructive |
|   |  pulmonary  |
|   | disease)    |
|   | (HCC)       |
|   |   COPD      |
|   | (chronic    |
|   | obstructive |
|   |  pulmonary  |
|   | disease)    |
|   | (HCC)       |
|   | 2018   |
|   |             |
|   |   Coronary  |
|   | artery      |
|   | disease     |
|   | stent       |
|   | placements: |
|   |  3 total    |
|   |             |
|   | Depression  |
|   |             |
|   |   Diabetes  |
|   | other       |
|   | abstracted  |
|   |             |
|   |   Diabetes  |
|   | mellitus,   |
|   | type 2      |
|   | (HCC)       |
|   |   ESRD on   |
 
|   | peritoneal  |
|   | dialysis    |
|   | (HCC)       |
|   |   GERD      |
|   | (gastroesop |
|   | hageal      |
|   | reflux      |
|   | disease)    |
|   | 1992        |
|   |             |
|   | Hemodialysi |
|   | s patient   |
|   | (HCC)       |
|   | 10/2016     |
|   | Stopped     |
|   | peritoneal  |
|   | and         |
|   | restarted   |
|   | hemodialysi |
|   | s           |
|   |             |
|   | Hemorrhage  |
|   | of          |
|   | gastrointes |
|   | tinal       |
|   | tract,      |
|   | unspecified |
|   |  2017    |
|   | low GI,     |
|   | rectal      |
|   | bleeding    |
|   |   High      |
|   | blood       |
|   | pressure    |
|   | other       |
|   | abstracted  |
|   |             |
|   |   High      |
|   | cholesterol |
|   |  other      |
|   | abstracted  |
|   |             |
|   |             |
|   | Hyperlipide |
|   | mark         |
|   |             |
|   | Hypertensio |
|   | n           |
|   |             |
|   | Hyponatremi |
|   | a 2017  |
|   |             |
|   |             |
|   | Myocardial  |
|   | infarction  |
|   | (HCC)       |
|   | 2017     |
|   |   Other     |
|   | chronic     |
|   | pain        |
 
|   | feet,       |
|   |   Pain of   |
|   | left hip    |
|   | joint       |
|   | 2016  |
|   |  due to hip |
|   |  fracture   |
|   | and         |
|   | replacement |
|   |             |
|   |   Partial   |
|   | small bowel |
|   |             |
|   | obstruction |
|   |  (HCC)      |
|   | 2017  |
|   |  resolved   |
|   |             |
|   |   Renal     |
|   | failure     |
|   | Stage 5.    |
|   | Fistula in  |
|   | the JAN -   |
|   | not on      |
|   | dialysis    |
|   | yet.        |
|   |             |
|   | Unspecified |
|   |  visual     |
|   | disturbance |
|   |    glasses  |
|   | Past        |
|   | Surgical    |
|   | History     |
|   | Procedure   |
|   | Laterality  |
|   | Date        |
|   |   AV        |
|   | FISTULA     |
|   | PLACEMENT   |
|   |   left  |
|   | upper arm   |
|   | AV fistula  |
|   | - failed    |
|   |   AV        |
|   | FISTULA     |
|   | REPAIR N/A  |
|   | 10/25/2016  |
|   |  Procedure: |
|   |  AV FISTULA |
|   |  - GRAFT    |
|   | REPAIR/REVI |
|   | PRANEETH;       |
|   | Surgeon:    |
|   | Kirt GRAHAM Mclaughlin,  |
|   | MD;         |
|   | Location:   |
|   | [facility] MA[facility]   |
|   | OR;         |
|   | Service:    |
 
|   | Vascular;   |
|   | Laterality: |
|   |  N/A;       |
|   | Superficial |
|   | ization and |
|   |  revision   |
|   | of left arm |
|   |  fistula    |
|   |   CARDIAC   |
|   | CATHETERIZA |
|   | TION        |
|   | 2017  |
|   |             |
|   |   CARPAL    |
|   | TUNNEL      |
|   | RELEASE     |
|   | Bilateral   |
|   | other       |
|   | abstracted  |
|   |             |
|   |   CATARACT  |
|   | EXTRACTION  |
|   | Bilateral   |
|   |         |
|   |   CATHETER  |
|   | REMOVAL N/A |
|   |  2016 |
|   |             |
|   | Procedure:  |
|   | DIALYSIS    |
|   | CATHETER -  |
|   | REMOVAL;    |
|   | Surgeon:    |
|   | Kirt Y Arnoldo,  |
|   | MD;         |
|   | Location:   |
|   | [facility]   |
|   | OR;         |
|   | Service:    |
|   | Vascular;   |
|   | Laterality: |
|   |  N/A;  PD   |
|   | cath        |
|   | removal     |
|   |             |
|   | CHOLECYSTEC |
|   | FELY  other |
|   |             |
|   | abstracted  |
|   |             |
|   |             |
|   | COLONOSCOPY |
|   |             |
|   |   CORONARY  |
|   | ARTERY      |
|   | BYPASS      |
|   | GRAFT       |
|   |   CORONARY  |
|   | STENT       |
|   | PLACEMENT   |
 
|   | 2017  |
|   |  Drug       |
|   | Elutin |
|   |  stents to  |
|   | OM, 1 stent |
|   |  to prox.   |
|   | LAD         |
|   |   EYE       |
|   | SURGERY     |
|   |             |
|   |   FOOT      |
|   | AMPUTATION  |
|   | Left        |
|   | 2018  |
|   |  Procedure: |
|   |  FOREFOOT - |
|   |             |
|   | AMPUTATION; |
|   |   Surgeon:  |
|   | Srinivasan L     |
|   | Abdi,    |
|   | DPM;        |
|   | Location:   |
|   | [facility]   |
|   | OR;         |
|   | Service:    |
|   | Podiatry;   |
|   | Laterality: |
|   |  Left;      |
|   |   HARDWARE  |
|   | PRESENT     |
|   | stent       |
|   | placements  |
|   | x 3, Left   |
|   | hip         |
|   | replacement |
|   | , vascular  |
|   | stents 2 in |
|   |  left leg   |
|   |             |
|   |   HIP       |
|   | ARTHROPLAST |
|   | Y Left      |
|   | 2016   |
|   | Procedure:  |
|   | HIP -       |
|   | ARTHROPLAST |
|   | Y(ALLISON);    |
|   | Surgeon:    |
|   | Uriel  |
|   | Black, MD;  |
|   |  Location:  |
|   | [facility] MA[facility]   |
|   | OR;         |
|   | Service:    |
|   | Orthopedics |
|   | ;           |
|   | Laterality: |
|   |  Left;      |
|   |             |
 
|   | HYSTERECTOM |
|   | Y  1998     |
|   | complete    |
|   |   PACEMAKER |
|   |  INSERTION  |
|   |    [place]   |
|   | scientific  |
|   | pacemaker/d |
|   | efib        |
|   |             |
|   | PERITONEAL  |
|   | CATHETER    |
|   | INSERTION   |
|   | 8/15/2013   |
|   |             |
|   |             |
|   | PERITONEAL  |
|   | CATHETER    |
|   | INSERTION   |
|   | N/A         |
|   | 2016   |
|   | Procedure:  |
|   | LAPAROSCOPI |
|   | C -         |
|   | PERITONEAL  |
|   | DIALYSIS    |
|   | CATH        |
|   | INSERTION;  |
|   |  Surgeon:   |
|   | Kirt Y Arnoldo,  |
|   | MD;         |
|   | Location:   |
|   | [facility] MA[facility]   |
|   | OR;         |
|   | Service:    |
|   | Vascular;   |
|   | Laterality: |
|   |  N/A;       |
|   | Removal of  |
|   | old         |
|   | catheter    |
|   |   SHOULDER  |
|   | SURGERY     |
|   | Right 1980  |
|   |  frozen     |
|   | shoulder    |
|   |   UNLISTED  |
|   | PROCEDURE   |
|   | ARTHROSCOPY |
|   |     total   |
|   | Left hip    |
|   |   vascular  |
|   | stents Left |
|   |  2017    |
|   | leg (2      |
|   | stents)     |
|   |   VASCULAR  |
|   | SURGERY     |
|   | Allergies   |
|   | Allergen    |
 
|   | Reactions   |
|   |             |
|   |   Quinapril |
|   |  Hcl        |
|   | Anaphylaxis |
|   |             |
|   |   Digoxin   |
|   | And Related |
|   |  Other (See |
|   |  Comments)  |
|   |   toxic     |
|   |             |
|   | Metaxalone  |
|   | Other (See  |
|   | Comments)   |
|   |             |
|   |   Morphine  |
|   | Mental      |
|   | Changes     |
|   | Make her    |
|   | crazy/      |
|   | "funny      |
|   | feeling in  |
|   | my head"Has |
|   |  used       |
|   | hydromorpho |
|   | ne in-house |
|   |             |
|   |             |
|   | Pantoprazol |
|   | e Sodium    |
|   | Nausea and  |
|   | Vomiting    |
|   |             |
|   | Penicillins |
|   |  Rash       |
|   |   Quinapril |
|   |  Hcl Edema  |
|   |   Facial    |
|   | Edema       |
|   |   Sudafed   |
|   | [Pseudoephe |
|   | drine Hcl]  |
|   | Rash No     |
|   | prescriptio |
|   | ns prior to |
|   |  admission. |
|   |  DISCHARGE  |
|   | EXAMVital   |
|   | Signs:BP    |
|   | 108/56 (BP  |
|   | Location:   |
|   | Right upper |
|   |  arm)  |    |
|   | Pulse 68  | |
|   |  Temp 97.7  |
|   |   F (36.5   |
|   |   C)        |
|   | (Axillary)  |
|   |  | Resp 20  |
 
|   |  | Ht 1.778 |
|   |  m (5' 10") |
|   |   | Wt 65.5 |
|   |  kg (144 lb |
|   |  6.4 oz)  | |
|   |  SpO2 92%   |
|   | | BMI 20.72 |
|   |  kg/m       |
|   | General     |
|   | appearance: |
|   |  alert,     |
|   | appears     |
|   | stated age, |
|   |             |
|   | cooperative |
|   | , fatigued  |
|   | and no      |
|   | distressHea |
|   | d:          |
|   | Normocephal |
|   | ic, without |
|   |  obvious    |
|   | abnormality |
|   | ,           |
|   | atraumaticN |
|   | chloe: no     |
|   | adenopathy, |
|   |  no carotid |
|   |  bruit, no  |
|   | JVD,        |
|   | supple,     |
|   | symmetrical |
|   | , trachea   |
|   | midline and |
|   |  thyroid    |
|   | not         |
|   | enlarged,   |
|   | symmetric,  |
|   | no          |
|   | tenderness/ |
|   | mass/nodule |
|   | sLungs:     |
|   | clear to    |
|   | auscultatio |
|   | n           |
|   | bilaterally |
|   | Heart:      |
|   | regular     |
|   | rate and    |
|   | rhythm, S1, |
|   |  S2 normal, |
|   |  no murmur, |
|   |  click, rub |
|   |  or         |
|   | gallopAbdom |
|   | en: soft,   |
|   | non-tender; |
|   |  bowel      |
|   | sounds      |
|   | normal; no  |
 
|   | masses,  no |
|   |             |
|   | organomegal |
|   | yExtremitie |
|   | s: left leg |
|   |  boot       |
|   | Pulses: 2+  |
|   | and         |
|   | symmetricNe |
|   | urologic:   |
|   | Grossly     |
|   | normal AXO3 |
|   |  non focal  |
|   | DATACBC:    |
|   | Lab Results |
|   |  Component  |
|   | Value Date  |
|   |  WBC 4.08   |
|   | 2019  |
|   |  RBC 2.83   |
|   | (L)         |
|   | 2019  |
|   |  HGB 9.7    |
|   | (L)         |
|   | 2019  |
|   |  HCT 29.7   |
|   | (L)         |
|   | 2019  |
|   |  .0  |
|   | (H)         |
|   | 2019  |
|   |  MCH 34.2   |
|   | (H)         |
|   | 2019  |
|   |  MCHC 32.5  |
|   | 2019  |
|   |  RDW 64.3   |
|   | (H)         |
|   | 2019  |
|   |      |
|   | (L)         |
|   | 2019  |
|   |  MPV 9.5    |
|   | 2019  |
|   |  DIFFTYPE   |
|   | MANUAL      |
|   | 2019  |
|   | CMP:  Lab   |
|   | Results     |
|   | Component   |
|   | Value Date  |
|   |       |
|   | 2019  |
|   |  K 4.4      |
|   | 2019  |
|   |       |
|   | 2019  |
|   |  CO2 28     |
|   | 2019  |
|   |  ANIONGAP   |
 
|   | 14          |
|   | 2019  |
|   |  GLUF 153   |
|   | (H)         |
|   | 2019  |
|   |  BUN 15     |
|   | 2019  |
|   |  CREATININE |
|   |  4.9 (H)    |
|   | 2019  |
|   |  BCR 3      |
|   | 2019  |
|   |  CA 9.6     |
|   | 2019  |
|   |  CA 8.7     |
|   | 2016  |
|   |  PROT 6.1   |
|   | (L)         |
|   | 2019  |
|   |  ALB 2.6    |
|   | (L)         |
|   | 2019  |
|   |  GLOB 3.5   |
|   | 2019  |
|   |  BILITOT    |
|   | 0.5         |
|   | 2019  |
|   |  ALP 85     |
|   | 2019  |
|   |  AST 22     |
|   | 2019  |
|   |  ALT 22     |
|   | 2019  |
|   |  EGFR 9 (L) |
|   |  2019 |
|   |  Lab        |
|   | Results     |
|   | Component   |
|   | Value Date  |
|   |  CKTOTAL 22 |
|   |  (L)        |
|   | 2019  |
|   |  CKMB 1.8   |
|   | 2019  |
|   |  CKMBINDEX  |
|   | 8.2         |
|   | 2019  |
|   |  TROPONINI  |
|   | 0.061       |
|   | 2019  |
|   | Disposition |
|   | :           |
|   | HomeConditi |
|   | on:         |
|   | StableCode  |
|   | Status:     |
|   | PriorDischa |
|   | rge         |
|   | Instruction |
|   | sDiet Renal |
 
|   |  Diet Low   |
|   | Sodium      |
|   | Activity as |
|   |  Advised by |
|   |  Physical   |
|   | Therapy     |
|   | Call MD     |
|   | for:        |
|   | Temperature |
|   |  > 100.4  F |
|   |  (38  C)    |
|   | Call MD     |
|   | for:        |
|   | Persistant  |
|   | Nausea and  |
|   | Vomiting    |
|   | Call MD     |
|   | for:        |
|   | Severe      |
|   | Uncontrolle |
|   | d Pain Call |
|   |  MD for:    |
|   | Redness,    |
|   | Tenderness, |
|   |  or Signs   |
|   | of          |
|   | Infection   |
|   | (Pain,      |
|   | Swelling,   |
|   | Redness,    |
|   | Odor or     |
|   | Green/Yello |
|   | w Discharge |
|   |  Around     |
|   | Incision    |
|   | Site) Call  |
|   | MD for:     |
|   | Hives Call  |
|   | MD for:     |
|   | Difficulty  |
|   | Breathing,  |
|   | Headache or |
|   |  Visual     |
|   | Disturbance |
|   | s Call MD   |
|   | for:        |
|   | Persistant  |
|   | Dizziness   |
|   | or          |
|   | Light-Heade |
|   | dness Call  |
|   | MD for:     |
|   | Extreme     |
|   | Fatigue     |
|   | Follow      |
|   | up:Ivy   |
|   | Couch,      |
|   |  [address]     |
|   | [address],   |
|   | [place]         |
 
|   | [address] |
|   | [place]        |
|   | [id number] |
|   | 1-10Sched |
|   | ule an      |
|   | appointment |
|   |  as soon as |
|   |  possible   |
|   | for a visit |
|   |  in 1       |
|   | weekJimmy   |
|   | [place],       |
|   | [id number]    |
|   | [address]  |
|   | [address] |
|   | [place]       |
|   | [id number] |
|   | [phone] |
|   | ule an      |
|   | appointment |
|   |  as soon as |
|   |  possible   |
|   | for a visit |
|   |  in 1       |
|   | weekBrian L |
|   |  [place],   |
|   | [id number]      |
|   | [address], |
|   |  [place]        |
|   | [address] |
|   |  [place]         |
|   | [id number] |
|   | [phone] |
|   | ule an      |
|   | appointment |
|   |  as soon as |
|   |  possible   |
|   | for a visit |
|   |  in 2       |
|   | weeksMershe |
|   | d [place], |
|   |  [id number]     |
|   | [address] |
|   |  [place]        |
|   | [place]   |
|   | [place]          |
|   | [id number] |
|   | [phone]    |
|   | needed      |
|   | Medication  |
|   | List        |
|   | CHANGE how  |
|   | you take    |
|   | these       |
|   | medications |
|   |             |
|   | carvedilol  |
|   | 12.5 MG     |
|   | tabletQTY:  |
|   |  60         |
 
|   | tabletRefil |
|   | ls:         |
|   | 0Doctor's   |
|   | comments:   |
|   | Hold for    |
|   | SBP less    |
|   | than        |
|   | 100Hold for |
|   |  HR less    |
|   | than        |
|   | 60Commonly  |
|   | known as:   |
|   | COREGTake 1 |
|   |  tablet by  |
|   | mouth 2     |
|   | (two) times |
|   |  daily with |
|   |  meals.What |
|   |  changed:   |
|   |  medication |
|   |  strength   |
|   |  how much   |
|   | to take     |
|   | LORazepam 1 |
|   |  MG         |
|   | tabletQTY:  |
|   |  30         |
|   | tabletRefil |
|   | ls:         |
|   | 0Commonly   |
|   | known as:   |
|   | ATIVANTake  |
|   | 1 tablet by |
|   |  mouth      |
|   | every 8     |
|   | (eight)     |
|   | hours as    |
|   | needed for  |
|   | Anxiety.Wha |
|   | t changed:  |
|   |  when to    |
|   | take this   |
|   | losartan 25 |
|   |  MG         |
|   | tabletQTY:  |
|   |  30         |
|   | tabletRefil |
|   | ls:         |
|   | 0Commonly   |
|   | known as:   |
|   | COZAARTake  |
|   | 1 tablet by |
|   |  mouth      |
|   | daily.What  |
|   | changed:    |
|   | how much to |
|   |  take       |
|   | CONTINUE    |
|   | taking      |
|   | these       |
 
|   | medications |
|   |   *         |
|   | albuterol   |
|   | 108 (90     |
|   | Base)       |
|   | MCG/ACT     |
|   | inhalerRefi |
|   | lls:        |
|   | 0Commonly   |
|   | known as:   |
|   | PROVENTIL   |
|   | HFA;VENTOLI |
|   | N HFA *     |
|   | VENTOLIN    |
|   |  (90 |
|   |  Base)      |
|   | MCG/ACT     |
|   | inhalerRefi |
|   | lls:        |
|   | 0Generic    |
|   | drug:       |
|   | albuterol   |
|   | apixaban    |
|   | 2.5 MG      |
|   | tabletQTY:  |
|   |  60         |
|   | tabletRefil |
|   | ls:         |
|   | 0Commonly   |
|   | known as:   |
|   | ELIQUISTake |
|   |  1 tablet   |
|   | by mouth 2  |
|   | (two) times |
|   |  daily.     |
|   | aspirin 81  |
|   | MG EC       |
|   | tabletQTY:  |
|   |  30         |
|   | tabletRefil |
|   | ls:  0Take  |
|   | 1 tablet by |
|   |  mouth      |
|   | daily with  |
|   | breakfast.  |
|   | atorvastati |
|   | n 40 MG     |
|   | tabletQTY:  |
|   |  30         |
|   | tabletRefil |
|   | ls:         |
|   | 0Commonly   |
|   | known as:   |
|   | LIPITORTake |
|   |  1 tablet   |
|   | by mouth    |
|   | nightly.    |
|   | calcium     |
|   | acetate 667 |
|   |  MG         |
 
|   | capsuleRefi |
|   | lls:        |
|   | 0Commonly   |
|   | known as:   |
|   | PHOSLO      |
|   | clopidogrel |
|   |  75 MG      |
|   | tabletQTY:  |
|   |  30         |
|   | tabletRefil |
|   | ls:         |
|   | 11Commonly  |
|   | known as:   |
|   | PLAVIXTake  |
|   | 1 tablet by |
|   |  mouth      |
|   | daily.      |
|   | esomeprazol |
|   | e 20 MG     |
|   | capsuleRefi |
|   | lls:        |
|   | 0Commonly   |
|   | known as:   |
|   | NEXIUM      |
|   | HYDROcodone |
|   | -acetaminop |
|   | hen 5-325   |
|   | MG per      |
|   | tabletQTY:  |
|   |  30         |
|   | tabletRefil |
|   | ls:         |
|   | 0Commonly   |
|   | known as:   |
|   | NORCOTake 1 |
|   |  tablet by  |
|   | mouth every |
|   |  4 (four)   |
|   | hours as    |
|   | needed.     |
|   | insulin     |
|   | aspart 100  |
|   | UNIT/ML     |
|   | injectionRe |
|   | fills:      |
|   | 0Commonly   |
|   | known as:   |
|   | NOVOLOG     |
|   | insulin     |
|   | degludec    |
|   | 100 UNIT/ML |
|   |             |
|   | injectionRe |
|   | fills:      |
|   | 0Commonly   |
|   | known as:   |
|   | TRESIBA     |
|   | isosorbide  |
|   | mononitrate |
|   |  60 MG 24   |
 
|   | hr          |
|   | tabletQTY:  |
|   |  30         |
|   | tabletRefil |
|   | ls:         |
|   | 1Commonly   |
|   | known as:   |
|   | IMDURTake 1 |
|   |  tablet by  |
|   | mouth       |
|   | daily.      |
|   | loperamide  |
|   | 2 MG        |
|   | capsuleRefi |
|   | lls:        |
|   | 0Commonly   |
|   | known as:   |
|   | IMODIUM     |
|   | nitroGLYCER |
|   | IN 0.4 MG   |
|   | SL          |
|   | tabletQTY:  |
|   |  30         |
|   | tabletRefil |
|   | ls:         |
|   | 0Doctor's   |
|   | comments:   |
|   | Hold for    |
|   | SBP less    |
|   | than        |
|   | 100Commonly |
|   |  known as:  |
|   |             |
|   | NITROSTATPl |
|   | ace 1       |
|   | tablet      |
|   | under the   |
|   | tongue      |
|   | every 5     |
|   | (five)      |
|   | minutes as  |
|   | needed for  |
|   | Chest pain. |
|   |             |
|   | nortriptyli |
|   | ne 25 MG    |
|   | capsuleRefi |
|   | lls:        |
|   | 0Commonly   |
|   | known as:   |
|   | PAMELOR     |
|   | ondansetron |
|   |  4 MG       |
|   | disintegrat |
|   | ing         |
|   | tabletRefil |
|   | ls:         |
|   | 0Commonly   |
|   | known as:   |
|   | ZOFRAN-ODT  |
 
|   | oxybutynin  |
|   | 5 MG        |
|   | tabletRefil |
|   | ls:         |
|   | 0Commonly   |
|   | known as:   |
|   | DITROPAN    |
|   | prochlorper |
|   | azine 5 MG  |
|   | tabletRefil |
|   | ls:  0      |
|   | ranitidine  |
|   | 150 MG      |
|   | tabletRefil |
|   | ls:         |
|   | 0Commonly   |
|   | known as:   |
|   | ZANTAC      |
|   | rOPINIRole  |
|   | 0.25 MG     |
|   | tabletRefil |
|   | ls:         |
|   | 0Commonly   |
|   | known as:   |
|   | REQUIP      |
|   | SLOW-MAG    |
|   | 71.5-119 MG |
|   |             |
|   | TbecRefills |
|   | :  0Generic |
|   |  drug:      |
|   | Magnesium   |
|   | Cl-Calcium  |
|   | Carbonate   |
|   | Vitamin D3  |
|   | 5000 units  |
|   | CapsRefills |
|   | :  0        |
|   | Vortioxetin |
|   | e HBr 10 MG |
|   |             |
|   | TabsRefills |
|   | :  0  *     |
|   | This list   |
|   | has 2       |
|   | medication( |
|   | s) that are |
|   |  the same   |
|   | as other    |
|   | medications |
|   |  prescribed |
|   |  for you.   |
|   | Read the    |
|   | directions  |
|   | carefully,  |
|   | and ask     |
|   | your doctor |
|   |  or other   |
|   | care        |
|   | provider to |
 
|   |  review     |
|   | them with   |
|   | you.    You |
|   |  might also |
|   |  be taking  |
|   | other       |
|   | medications |
|   |  not listed |
|   |  above. If  |
|   | you have    |
|   | questions   |
|   | about any   |
|   | of your     |
|   | other       |
|   | medications |
|   | , talk to   |
|   | the person  |
|   | who         |
|   | prescribed  |
|   | them or     |
|   | your        |
|   | Primary     |
|   | Care        |
|   | Provider.   |
|   |   STOP      |
|   | taking      |
|   | these       |
|   | medications |
|   |             |
|   | CefTAZidime |
|   |  and        |
|   | Dextrose    |
|   | 2-5         |
|   | GM-%(50ML)  |
|   | Solr        |
|   | furosemide  |
|   | 20 MG       |
|   | tabletCommo |
|   | nly known   |
|   | as:  LASIX  |
|   | vancomycin  |
|   | 1000 mg     |
|   | injectionCo |
|   | mmonly      |
|   | known as:   |
|   | VANCOCIN    |
|   | Where to    |
|   | Get Your    |
|   | Medications |
|   |   You can   |
|   | get these   |
|   | medications |
|   |  from any   |
|   | pharmacy    |
|   | Bring a     |
|   | paper       |
|   | prescriptio |
|   | n for each  |
|   | of these    |
|   | medications |
 
|   |             |
|   | carvedilol  |
|   | 12.5 MG     |
|   | tablet      |
|   | LORazepam 1 |
|   |  MG         |
|   | tablet      |
|   | nitroGLYCER |
|   | IN 0.4 MG   |
|   | SL tablet   |
|   | Discharge   |
|   | took over   |
|   | 30          |
|   | minutes, to |
|   |  include    |
|   | final       |
|   | examination |
|   | ,           |
|   | discussion  |
|   | of          |
|   | admission,  |
|   | and         |
|   | preparation |
|   |  of         |
|   | prescriptio |
|   | ns,         |
|   | instruction |
|   | s for       |
|   | on-going    |
|   | care,       |
|   | follow-up   |
|   | and         |
|   | documentati |
|   | on of       |
|   | discharge   |
|   | summary.Christian |
|   | alexandra Ferrara,  |
|   | MD2019 |
+---+-------------+
 
 
 
+--------+-------------+---+----------------------+----------------------+
| 01/15/ | Documentati |   |   Rl         | Other (Diagnosis     |
|    | on Only     |   | CHELSEY Hays         | request sent to      |
|        |             |   |                      | [facility] and           |
|        |             |   |                      | confirmation)        |
+--------+-------------+---+----------------------+----------------------+
| / | Documentati |   |   João Asher MD  | Other (December      |
2019   | on Only     |   |                      | [facility] |
|        |             |   |                      |  Dialysis Rounding   |
|        |             |   |                      | Note)                |
+--------+-------------+---+----------------------+----------------------+
| 01/10/ | Office      |   |   Srinivasan Abdi,  | S/P amputation of    |
|    | Visit       |   | DPM                  | foot, left (HCC)     |
|        |             |   |                      | (Primary Dx); Type 2 |
|        |             |   |                      |  diabetes mellitus   |
|        |             |   |                      | with chronic kidney  |
|        |             |   |                      | disease on chronic   |
|        |             |   |                      | dialysis, with       |
 
|        |             |   |                      | long-term current    |
|        |             |   |                      | use of insulin (HCC) |
+--------+-------------+---+----------------------+----------------------+
 from Last 3 Months
 
 Immunizations
 
 
+----------------------+-------------------------------------------+----------+
| Name                 | Dates Previously Given                    | Next Due |
+----------------------+-------------------------------------------+----------+
| Hepatitis B Adult    | 2016                                |          |
+----------------------+-------------------------------------------+----------+
| INFLUENZA PF,        | 2018 (Deferred:  - Pt states she    |          |
| QUADRIVALENT         | already had flu shot at dialysis clinic.  |          |
| (PED/ADOL/ADULT)     | Refused vax in hospital)                  |          |
+----------------------+-------------------------------------------+----------+
| Influenza, Trivalent | 2016                                |          |
|  W/Preservative      |                                           |          |
+----------------------+-------------------------------------------+----------+
| Pneumococcal         | 2012                                |          |
| Polysaccharide       |                                           |          |
| 23-valent            |                                           |          |
+----------------------+-------------------------------------------+----------+
 
 
 
 Family History
 
 
+------------------+-----------+------+----------+
| Medical History  | Relation  | Name | Comments |
+------------------+-----------+------+----------+
| High cholesterol | Father    | pvd  |          |
+------------------+-----------+------+----------+
| Hypertension     | Father    | pvd  |          |
+------------------+-----------+------+----------+
| Diabetes         | Paternal  |      |          |
|                  | Grandmoth |      |          |
|                  | er        |      |          |
+------------------+-----------+------+----------+
| Kidney disease   | Neg Hx    |      |          |
+------------------+-----------+------+----------+
| Malig hypertherm | Neg Hx    |      |          |
+------------------+-----------+------+----------+
 
 
 
+----------------------+----------+----------+----------+
| Relation             | Name     | Status   | Comments |
+----------------------+----------+----------+----------+
| Father               | pvd      |  |          |
+----------------------+----------+----------+----------+
| Mother               | dementia |  |          |
+----------------------+----------+----------+----------+
| Paternal Grandmother |          |          |          |
+----------------------+----------+----------+----------+
 
 
 
 
 Social History
 
 
+---------------+------------+-----------+--------+------------------+
| Tobacco Use   | Types      | Packs/Day | Years  | Date             |
|               |            |           | Used   |                  |
+---------------+------------+-----------+--------+------------------+
| Former Smoker | Cigarettes | 1         | 30     | Quit: 2004 |
+---------------+------------+-----------+--------+------------------+
 
 
 
+---------------------+---+---+---+
| Smokeless Tobacco:  |   |   |   |
| Never Used          |   |   |   |
+---------------------+---+---+---+
 
 
 
+-----------------------------------------+
| Tobacco Cessation: Counseling Given: No |
| Comments: quite smoking 2004            |
+-----------------------------------------+
 
 
 
+-------------+-----------+---------+----------+
| Alcohol Use | Drinks/We | oz/Week | Comments |
|             | ek        |         |          |
+-------------+-----------+---------+----------+
| No          |           |         |          |
+-------------+-----------+---------+----------+
 
 
 
+------------------+---------------+
| Sex Assigned at  | Date Recorded |
| Birth            |               |
+------------------+---------------+
| Not on file      |               |
+------------------+---------------+
 
 
 
 Last Filed Vital Signs
 
 
+-------------------+---------------------+-------------------------+
| Vital Sign        | Reading             | Time Taken              |
+-------------------+---------------------+-------------------------+
| Blood Pressure    | 109/52              | 2019 11:53 AM PST |
+-------------------+---------------------+-------------------------+
| Pulse             | 71                  | 2019 11:53 AM PST |
+-------------------+---------------------+-------------------------+
| Temperature       | 36.8   C (98.2   F) | 2019  2:40 PM PST |
+-------------------+---------------------+-------------------------+
| Respiratory Rate  | 18                  | 02/15/2019 11:30 AM PST |
+-------------------+---------------------+-------------------------+
| Oxygen Saturation | 96%                 | 2019 11:53 AM PST |
+-------------------+---------------------+-------------------------+
 
| Inhaled Oxygen    | -                   | -                       |
| Concentration     |                     |                         |
+-------------------+---------------------+-------------------------+
| Weight            | 65.3 kg (144 lb)    | 2019 10:29 PM PST |
+-------------------+---------------------+-------------------------+
| Height            | 177.8 cm (5' 10")   | 2019 10:29 PM PST |
+-------------------+---------------------+-------------------------+
| Body Mass Index   | 20.66               | 2019 10:29 PM PST |
+-------------------+---------------------+-------------------------+
 
 
 
 Plan of Treatment
 
 
+--------+---------+-----------+----------------------+-------------+
| Date   | Type    | Specialty | Care Team            | Description |
+--------+---------+-----------+----------------------+-------------+
| 04/10/ | Office  |           |   Srinivasan Abdi,  |             |
| 2019   | Visit   |           | DPM  [address], |             |
|        |         |           |  [address]  [facility] [place]  |             |
|        |         |           | [place] [address]             |             |
|        |         |           | [phone]         |             |
|        |         |           | [phone] (Fax)   |             |
+--------+---------+-----------+----------------------+-------------+
 
 
 
+----------------------+-----------+---------------------------------+----------+
| Health Maintenance   | Due Date  | Last Done                       | Comments |
+----------------------+-----------+---------------------------------+----------+
| Diabetic Eye Exam    |  |                                 |          |
|                      | 9         |                                 |          |
+----------------------+-----------+---------------------------------+----------+
| Vaccine:             |  |                                 |          |
| Dtap/Tdap/Td (1 -    | 8         |                                 |          |
| Tdap)                |           |                                 |          |
+----------------------+-----------+---------------------------------+----------+
| Breast Cancer        |  |                                 |          |
| Screening            | 9         |                                 |          |
| (Mammogram)          |           |                                 |          |
+----------------------+-----------+---------------------------------+----------+
| Colon Cancer         |  |                                 |          |
| Screening            | 9         |                                 |          |
| (Colonoscopy)        |           |                                 |          |
+----------------------+-----------+---------------------------------+----------+
| Vaccine: Zoster (1   |  |                                 |          |
| of 2)                | 9         |                                 |          |
+----------------------+-----------+---------------------------------+----------+
| DEXA SCAN SCREENING  |  |                                 |          |
|                      | 4         |                                 |          |
+----------------------+-----------+---------------------------------+----------+
| Vaccine:             |  | 2012                      |          |
| Pneumococcal 65+     | 4         |                                 |          |
| High/Highest Risk (1 |           |                                 |          |
|  of 2 - PCV13)       |           |                                 |          |
+----------------------+-----------+---------------------------------+----------+
| Lung Cancer          |  | 2017                      |          |
| Screening            | 8         |                                 |          |
+----------------------+-----------+---------------------------------+----------+
 
| Hemoglobin A1c       |  | 2018, 2018,         |          |
|                      | 9         | 2017, Additional history  |          |
|                      |           | exists                          |          |
+----------------------+-----------+---------------------------------+----------+
| Vaccine: Influenza   |  | 2016                      |          |
| (Season Ended)       | 9         |                                 |          |
+----------------------+-----------+---------------------------------+----------+
| Diabetic Foot Exam   |  | 2018, 2017          |          |
|                      | 9         |                                 |          |
+----------------------+-----------+---------------------------------+----------+
 
 
 
 Implants
 
 
+-------------------------------+-------+--------+-------------+--------+--------+--------+
| Implanted                     | Type  | Area   | Manufacture | Device | Expira | Model  |
|                               |       |        | r           |        | tion   | /      |
|                               |       |        |             | Identi | Date   | Serial |
|                               |       |        |             | fier   |        |  / Lot |
+-------------------------------+-------+--------+-------------+--------+--------+--------+
| Express                       | Stent | Arteri | BOSTON      |        |        | RENAL  |
| Sd-2017Implanted: Qty: 1 |       | al     | SCIENTIFIC  |        |        | /BILIA |
|  on 2017 by Kirt Mclaughlin, |       |        | MAURICE - [employer] |        |        | RY /   |
|  MD                           |       |        |             |        |        | / |
|                               |       |        |             |        |        | 046    |
+-------------------------------+-------+--------+-------------+--------+--------+--------+
| Express                       | Stent | Left:  | [employer]OSTON      |        |        | [id number] |
| Stent-2017Implanted: Qty: |       | Arteri | MEDICAL     |        |        | [id number] |
|  1 on 2017 by Kirt Mclaughlin  |       | al     | PRODUCTS    |        |        | 130    |
| MD GRAHAM                         |       |        |             |        |        | /[id number] |
|                               |       |        |             |        |        | [id number] |
|                               |       |        |             |        |        | 694    |
|                               |       |        |             |        |        | /24865 |
|                               |       |        |             |        |        | 695    |
+-------------------------------+-------+--------+-------------+--------+--------+--------+
| Synergy Georgi 3.5 X             | Stent | Corona | BOSTON      |        | / | [id number] |
| 16-2017Implanted: Qty: 1  |       | ry     | SCIENTIFIC  |        | 2018   | [id number] |
| on 2017 by Cooper,      |       |        |             |        |        | 50 /   |
| Marcos BRADSHAW MD               |       |        |             |        |        | / |
|                               |       |        |             |        |        | 346    |
+-------------------------------+-------+--------+-------------+--------+--------+--------+
| Synergy Georgi 2.25 X            | Stent | Corona | [employer]      |        | / | [id number] |
| 16-2017Implanted: Qty: 1  |       | ry     | SCIENTIFIC  |        |    | [id number] |
| on 2017 by Cooper,      |       |        |             |        |        | 20 /   |
| Marcos BRADSHAW MD               |       |        |             |        |        | / |
|                               |       |        |             |        |        | 165    |
+-------------------------------+-------+--------+-------------+--------+--------+--------+
| Synergy Georgi 2.25 X            | Stent | Corona | [employer]      |        | / | [id number] |
| 8-2017Implanted: Qty: 1   |       | ry     | SCIENTIFIC  |        |    | [id number] |
| on 2017 by Cooper,      |       |        |             |        |        | 20 /   |
| Marcos BRADSHAW MD               |       |        |             |        |        | / |
|                               |       |        |             |        |        | 438    |
+-------------------------------+-------+--------+-------------+--------+--------+--------+
| Epic                          | Stent | Right: | [employer]      |        | / | [id number] |
| Vascular-2019Implanted:  |       |        | SCIENTIFIC  |        |    | 2000 |
| Qty: 1 on 2019 by Arnoldo,  |       | Arteri |             |        |        | 020    |
| Kirt STEVENSON MD                     |       | al     |             |        |        | /N/A   |
|                               |       |        |             |        |        | / |
 
|                               |       |        |             |        |        | 135    |
+-------------------------------+-------+--------+-------------+--------+--------+--------+
| Supercable Iso Elastic        |       | Left:  | [employer]A     |        | / |  |
| Cerclage System   1.5 Mm      |       | Femur  |             |        |    | -1010  |
| PolymerImplanted: Qty: 1 on   |       |        |             |        |        | /      |
| 2016 by Black,          |       |        |             |        |        | / |
| MD Uriel                |       |        |             |        |        | 2      |
+-------------------------------+-------+--------+-------------+--------+--------+--------+
| Imp Hip Stem Bplr Slfctr      |       | Left:  | JJHCS DEPUY |        | / | 1035-4 |
| 48x28 - [id number]:    |       | Femur  |  ORTHO -    |        | 2020   | 8-000  |
| Qty: 1 on 2016 by       |       |        | DEPU        |        |        | /      |
| Uriel Roa MD         |       |        |             |        |        | /[id number] |
|                               |       |        |             |        |        | 3      |
+-------------------------------+-------+--------+-------------+--------+--------+--------+
| Corail Femoral StemImplanted: |       | Left:  | DEPPAULINE       |        | / | [id number] |
|  Qty: 1 on 2016 by      |       | Femur  |             |        | 2020   | 3 / /  |
| Uriel Roa MD         |       |        |             |        |        |        |
+-------------------------------+-------+--------+-------------+--------+--------+--------+
| Imp Hip Fem Hd Artc 28mm Pls5 |       | Left:  | JJHCS DEPUY |        | / | 1365-1 |
|  - [id number]: Qty: 1  |       | Femur  |  ORTHO -    |        | 2020   | 2-000  |
| on 2016 by Black,       |       |        | DEPU        |        |        | /      |
| MD Uriel                |       |        |             |        |        | / |
|                               |       |        |             |        |        | 1034   |
+-------------------------------+-------+--------+-------------+--------+--------+--------+
| Xgrft Vascu-Guard Ptch 0.8x8  |       |        | HOOKER      |        | / | VG-010 |
| - SnaImplanted: Qty: 1 on     |       |        | BIOSCIENCE  |        |    | 8N /   |
| 10/25/2016 by Kirt Mclaughlin MD  |       |        | - OLGA      |        |        | /[id number] |
|                               |       |        |             |        |        |  |
|                               |       |        |             |        |        | [id number]   |
+-------------------------------+-------+--------+-------------+--------+--------+--------+
 
 
 
 Procedures
 
 
+----------------------+--------+-------------+----------------------+----------------------
+
| Procedure Name       | Priori | Date/Time   | Associated Diagnosis | Comments             
|
|                      | ty     |             |                      |                      
|
+----------------------+--------+-------------+----------------------+----------------------
+
| XR FOOT RIGHT        | Routin | 2019  |   Toe pain, right    |   Results for this   
|
|                      | e      |  2:58 PM    |                      | procedure are in the 
|
|                      |        | PST         |                      |  results section.    
|
+----------------------+--------+-------------+----------------------+----------------------
+
| XR FOOT LEFT         | Routin | 2019  |   Post-operative     |   Results for this   
|
|                      | e      |  2:58 PM    | state                | procedure are in the 
|
|                      |        | PST         |                      |  results section.    
|
+----------------------+--------+-------------+----------------------+----------------------
+
 
| POCT GLUCOSE         | Routin | 02/15/2019  |                      |   Results for this   
|
|                      | e      | 11:36 AM    |                      | procedure are in the 
|
|                      |        | PST         |                      |  results section.    
|
+----------------------+--------+-------------+----------------------+----------------------
+
| POCT GLUCOSE         | Routin | 02/15/2019  |                      |   Results for this   
|
|                      | e      |  6:01 AM    |                      | procedure are in the 
|
|                      |        | PST         |                      |  results section.    
|
+----------------------+--------+-------------+----------------------+----------------------
+
| POCT GLUCOSE         | Routin | 2019  |                      |   Results for this   
|
|                      | e      | 10:32 PM    |                      | procedure are in the 
|
|                      |        | PST         |                      |  results section.    
|
+----------------------+--------+-------------+----------------------+----------------------
+
| [facility] SEPTIC LACTIC   | STAT   | 2019  |                      |   Results for this   
|
| ACID                 |        | 10:12 PM    |                      | procedure are in the 
|
|                      |        | PST         |                      |  results section.    
|
+----------------------+--------+-------------+----------------------+----------------------
+
| [facility] SEPTIC [facility]ACT[facility]   | STAT   | 2019  |                      |   Results for this   
|
| ACID                 |        |  7:22 PM    |                      | procedure are in the 
|
|                      |        | PST         |                      |  results section.    
|
+----------------------+--------+-------------+----------------------+----------------------
+
| TSH                  | STAT   | 2019  |                      |   Results for this   
|
|                      |        |  5:39 PM    |                      | procedure are in the 
|
|                      |        | PST         |                      |  results section.    
|
+----------------------+--------+-------------+----------------------+----------------------
+
| FOLATE               | Add-On | 2019  |                      |   Results for this   
|
|                      |        |  5:39 PM    |                      | procedure are in the 
|
|                      |        | PST         |                      |  results section.    
|
+----------------------+--------+-------------+----------------------+----------------------
+
| VITAMIN B12          | Add-On | 2019  |                      |   Results for this   
|
|                      |        |  5:39 PM    |                      | procedure are in the 
|
 
|                      |        | PST         |                      |  results section.    
|
+----------------------+--------+-------------+----------------------+----------------------
+
| SEDIMENTATION RATE,  | STAT   | 2019  |                      |   Results for this   
|
| AUTOMATED            |        |  5:39 PM    |                      | procedure are in the 
|
|                      |        | PST         |                      |  results section.    
|
+----------------------+--------+-------------+----------------------+----------------------
+
| COMPREHENSIVE        | STAT   | 2019  |                      |   Results for this   
|
| METABOLIC PANEL      |        |  5:39 PM    |                      | procedure are in the 
|
|                      |        | PST         |                      |  results section.    
|
+----------------------+--------+-------------+----------------------+----------------------
+
| CBC W/AUTO DIFF      | STAT   | 2019  |                      |   Results for this   
|
| (REFLEX TO MANUAL)   |        |  5:39 PM    |                      | procedure are in the 
|
|                      |        | PST         |                      |  results section.    
|
+----------------------+--------+-------------+----------------------+----------------------
+
| C-REACTIVE PROTEIN   | STAT   | 2019  |                      |   Results for this   
|
|                      |        |  5:39 PM    |                      | procedure are in the 
|
|                      |        | PST         |                      |  results section.    
|
+----------------------+--------+-------------+----------------------+----------------------
+
| BLOOD CULTURE, SET 2 | STAT   | 2019  |                      |   Results for this   
|
|                      |        |  5:39 PM    |                      | procedure are in the 
|
|                      |        | PST         |                      |  results section.    
|
+----------------------+--------+-------------+----------------------+----------------------
+
| XR TOES RIGHT        | ASAP   | 2019  |                      |   Results for this   
|
|                      |        |  5:08 PM    |                      | procedure are in the 
|
|                      |        | PST         |                      |  results section.    
|
+----------------------+--------+-------------+----------------------+----------------------
+
| [facility] SEPTIC [facility]ACT[facility]   | STAT   | 2019  |                      |   Results for this   
|
| ACID                 |        |  4:55 PM    |                      | procedure are in the 
|
|                      |        | PST         |                      |  results section.    
|
+----------------------+--------+-------------+----------------------+----------------------
+
 
| BLOOD CULTURE, SET 1 | STAT   | 2019  |                      |   Results for this   
|
|                      |        |  4:55 PM    |                      | procedure are in the 
|
|                      |        | PST         |                      |  results section.    
|
+----------------------+--------+-------------+----------------------+----------------------
+
| ED INFORMATION       | Routin | 2019  |                      |   Results for this   
|
| EXCHANGE             | e      |  3:21 PM    |                      | procedure are in the 
|
|                      |        | PST         |                      |  results section.    
|
+----------------------+--------+-------------+----------------------+----------------------
+
| ED INFORMATION       | Routin | 2019  |                      |   Results for this   
|
| EXCHANGE             | e      |  3:58 PM    |                      | procedure are in the 
|
|                      |        | PST         |                      |  results section.    
|
+----------------------+--------+-------------+----------------------+----------------------
+
| POCT GLUCOSE         | Routin | 2019  |                      |   Results for this   
|
|                      | e      | 12:10 PM    |                      | procedure are in the 
|
|                      |        | PST         |                      |  results section.    
|
+----------------------+--------+-------------+----------------------+----------------------
+
| POCT GLUCOSE         | Routin | 2019  |                      |   Results for this   
|
|                      | e      |  5:55 AM    |                      | procedure are in the 
|
|                      |        | PST         |                      |  results section.    
|
+----------------------+--------+-------------+----------------------+----------------------
+
| PHOSPHOROUS          | Routin | 2019  |                      |   Results for this   
|
|                      | e      |  5:38 AM    |                      | procedure are in the 
|
|                      |        | PST         |                      |  results section.    
|
+----------------------+--------+-------------+----------------------+----------------------
+
| MAGNESIUM            | Routin | 2019  |                      |   Results for this   
|
|                      | e      |  5:38 AM    |                      | procedure are in the 
|
|                      |        | PST         |                      |  results section.    
|
+----------------------+--------+-------------+----------------------+----------------------
+
| BASIC METABOLIC      | Routin | 2019  |                      |   Results for this   
|
| PANEL                | e      |  5:38 AM    |                      | procedure are in the 
|
 
|                      |        | PST         |                      |  results section.    
|
+----------------------+--------+-------------+----------------------+----------------------
+
| CBC W/AUTO DIFF      | Routin | 2019  |                      |   Results for this   
|
| (REFLEX TO MANUAL)   | e      |  5:38 AM    |                      | procedure are in the 
|
|                      |        | PST         |                      |  results section.    
|
+----------------------+--------+-------------+----------------------+----------------------
+
| POCT GLUCOSE         | Routin | 2019  |                      |   Results for this   
|
|                      | e      |  9:29 PM    |                      | procedure are in the 
|
|                      |        | PST         |                      |  results section.    
|
+----------------------+--------+-------------+----------------------+----------------------
+
| POCT GLUCOSE         | Routin | 2019  |                      |   Results for this   
|
|                      | e      |  4:06 PM    |                      | procedure are in the 
|
|                      |        | PST         |                      |  results section.    
|
+----------------------+--------+-------------+----------------------+----------------------
+
| POCT GLUCOSE         | Routin | 2019  |                      |   Results for this   
|
|                      | e      | 12:13 PM    |                      | procedure are in the 
|
|                      |        | PST         |                      |  results section.    
|
+----------------------+--------+-------------+----------------------+----------------------
+
| EKG STANDARD 12 LEAD | Routin | 2019  |                      |   Results for this   
|
|                      | e      |  6:18 AM    |                      | procedure are in the 
|
|                      |        | PST         |                      |  results section.    
|
+----------------------+--------+-------------+----------------------+----------------------
+
| POCT GLUCOSE         | Routin | 2019  |                      |   Results for this   
|
|                      | e      |  5:21 AM    |                      | procedure are in the 
|
|                      |        | PST         |                      |  results section.    
|
+----------------------+--------+-------------+----------------------+----------------------
+
| BASIC METABOLIC      | Routin | 2019  |                      |   Results for this   
|
| PANEL                | e - AM |  4:53 AM    |                      | procedure are in the 
|
|                      |        | PST         |                      |  results section.    
|
+----------------------+--------+-------------+----------------------+----------------------
+
 
| CBC W/AUTO DIFF      | Routin | 2019  |                      |   Results for this   
|
| (REFLEX TO MANUAL)   | e - AM |  4:53 AM    |                      | procedure are in the 
|
|                      |        | PST         |                      |  results section.    
|
+----------------------+--------+-------------+----------------------+----------------------
+
| POCT GLUCOSE         | Routin | 2019  |                      |   Results for this   
|
|                      | e      |  9:00 PM    |                      | procedure are in the 
|
|                      |        | PST         |                      |  results section.    
|
+----------------------+--------+-------------+----------------------+----------------------
+
| IR STENT FEMORAL     | Routin | 2019  |                      |   Results for this   
|
| POPLITEAL            | e      |  6:45 PM    |                      | procedure are in the 
|
|                      |        | PST         |                      |  results section.    
|
+----------------------+--------+-------------+----------------------+----------------------
+
| IR AORTAGRAM         | Routin | 2019  |                      |   Results for this   
|
| ABDOMINAL            | e      |  6:45 PM    |                      | procedure are in the 
|
| SERIALOGRAM          |        | PST         |                      |  results section.    
|
+----------------------+--------+-------------+----------------------+----------------------
+
| IR ANGIOGRAM         | Routin | 2019  |                      |   Results for this   
|
| EXTREMITY RIGHT      | e      |  6:45 PM    |                      | procedure are in the 
|
|                      |        | PST         |                      |  results section.    
|
+----------------------+--------+-------------+----------------------+----------------------
+
| BLOOD CULTURE, SET 2 | Timed  | 2019  |                      |   Results for this   
|
|                      |        |  6:05 PM    |                      | procedure are in the 
|
|                      |        | PST         |                      |  results section.    
|
+----------------------+--------+-------------+----------------------+----------------------
+
| IR GUIDANCE VASCULAR | Routin | 2019  |                      |   Results for this   
|
|  ACCESS US           | e      |  5:40 PM    |                      | procedure are in the 
|
|                      |        | PST         |                      |  results section.    
|
+----------------------+--------+-------------+----------------------+----------------------
+
| C-REACTIVE PROTEIN   | STAT   | 2019  |                      |   Results for this   
|
|                      |        |  3:43 PM    |                      | procedure are in the 
|
 
|                      |        | PST         |                      |  results section.    
|
+----------------------+--------+-------------+----------------------+----------------------
+
| SEDIMENTATION RATE,  | STAT   | 2019  |                      |   Results for this   
|
| AUTOMATED            |        |  3:43 PM    |                      | procedure are in the 
|
|                      |        | PST         |                      |  results section.    
|
+----------------------+--------+-------------+----------------------+----------------------
+
| CK                   | STAT   | 2019  |                      |   Results for this   
|
|                      |        |  3:43 PM    |                      | procedure are in the 
|
|                      |        | PST         |                      |  results section.    
|
+----------------------+--------+-------------+----------------------+----------------------
+
| COMPREHENSIVE        | STAT   | 2019  |                      |   Results for this   
|
| METABOLIC PANEL      |        |  3:43 PM    |                      | procedure are in the 
|
|                      |        | PST         |                      |  results section.    
|
+----------------------+--------+-------------+----------------------+----------------------
+
| CBC W/MANUAL DIFF    | STAT   | 2019  |                      |   Results for this   
|
|                      |        |  3:43 PM    |                      | procedure are in the 
|
|                      |        | PST         |                      |  results section.    
|
+----------------------+--------+-------------+----------------------+----------------------
+
| BLOOD CULTURE, SET 1 | Timed  | 2019  |                      |   Results for this   
|
|                      |        |  3:43 PM    |                      | procedure are in the 
|
|                      |        | PST         |                      |  results section.    
|
+----------------------+--------+-------------+----------------------+----------------------
+
| US LOWER EXTREMITY   | STAT   | 2019  |                      |   Results for this   
|
| ARTERIAL RIGHT       |        |  2:23 PM    |                      | procedure are in the 
|
|                      |        | PST         |                      |  results section.    
|
+----------------------+--------+-------------+----------------------+----------------------
+
| ED INFORMATION       | Routin | 2019  |                      |   Results for this   
|
| EXCHANGE             | e      |  1:03 PM    |                      | procedure are in the 
|
|                      |        | PST         |                      |  results section.    
|
+----------------------+--------+-------------+----------------------+----------------------
+
 
| POCT GLUCOSE         | Routin | 2019  |                      |   Results for this   
|
|                      | e      | 11:45 AM    |                      | procedure are in the 
|
|                      |        | PST         |                      |  results section.    
|
+----------------------+--------+-------------+----------------------+----------------------
+
| CBC W/MANUAL DIFF    | Routin | 2019  |                      |   Results for this   
|
|                      | e      |  5:54 AM    |                      | procedure are in the 
|
|                      |        | PST         |                      |  results section.    
|
+----------------------+--------+-------------+----------------------+----------------------
+
| SEDIMENTATION RATE,  | Routin | 2019  |                      |   Results for this   
|
| AUTOMATED            | e - AM |  5:54 AM    |                      | procedure are in the 
|
|                      |        | PST         |                      |  results section.    
|
+----------------------+--------+-------------+----------------------+----------------------
+
| C-REACTIVE PROTEIN   | Routin | 2019  |                      |   Results for this   
|
|                      | e - AM |  5:54 AM    |                      | procedure are in the 
|
|                      |        | PST         |                      |  results section.    
|
+----------------------+--------+-------------+----------------------+----------------------
+
| COMPREHENSIVE        | Routin | 2019  |                      |   Results for this   
|
| METABOLIC PANEL      | e - AM |  5:54 AM    |                      | procedure are in the 
|
|                      |        | PST         |                      |  results section.    
|
+----------------------+--------+-------------+----------------------+----------------------
+
| POCT GLUCOSE         | Routin | 2019  |                      |   Results for this   
|
|                      | e      |  5:22 AM    |                      | procedure are in the 
|
|                      |        | PST         |                      |  results section.    
|
+----------------------+--------+-------------+----------------------+----------------------
+
| POCT GLUCOSE         | Routin | 2019  |                      |   Results for this   
|
|                      | e      |  9:02 PM    |                      | procedure are in the 
|
|                      |        | PST         |                      |  results section.    
|
+----------------------+--------+-------------+----------------------+----------------------
+
| POCT GLUCOSE         | Routin | 2019  |                      |   Results for this   
|
|                      | e      |  4:16 PM    |                      | procedure are in the 
|
 
|                      |        | PST         |                      |  results section.    
|
+----------------------+--------+-------------+----------------------+----------------------
+
| POCT GLUCOSE         | Routin | 2019  |                      |   Results for this   
|
|                      | e      | 12:17 PM    |                      | procedure are in the 
|
|                      |        | PST         |                      |  results section.    
|
+----------------------+--------+-------------+----------------------+----------------------
+
| RENAL FUNCTION PANEL | STAT   | 2019  |                      |   Results for this   
|
|                      |        |  8:47 AM    |                      | procedure are in the 
|
|                      |        | PST         |                      |  results section.    
|
+----------------------+--------+-------------+----------------------+----------------------
+
| CBC W/AUTO DIFF      | STAT   | 2019  |                      |   Results for this   
|
| (REFLEX TO MANUAL)   |        |  8:47 AM    |                      | procedure are in the 
|
|                      |        | PST         |                      |  results section.    
|
+----------------------+--------+-------------+----------------------+----------------------
+
| EKG STANDARD 12 LEAD | Routin | 2019  |                      |   Results for this   
|
|                      | e      |  5:50 AM    |                      | procedure are in the 
|
|                      |        | PST         |                      |  results section.    
|
+----------------------+--------+-------------+----------------------+----------------------
+
| POCT GLUCOSE         | Routin | 2019  |                      |   Results for this   
|
|                      | e      |  5:18 AM    |                      | procedure are in the 
|
|                      |        | PST         |                      |  results section.    
|
+----------------------+--------+-------------+----------------------+----------------------
+
| POCT GLUCOSE         | Routin | 2019  |                      |   Results for this   
|
|                      | e      | 10:42 PM    |                      | procedure are in the 
|
|                      |        | PST         |                      |  results section.    
|
+----------------------+--------+-------------+----------------------+----------------------
+
| POCT GLUCOSE         | Routin | 2019  |                      |   Results for this   
|
|                      | e      |  9:16 PM    |                      | procedure are in the 
|
|                      |        | PST         |                      |  results section.    
|
+----------------------+--------+-------------+----------------------+----------------------
+
 
| TROPONIN I           | Timed  | 2019  |                      |   Results for this   
|
|                      |        |  5:47 PM    |                      | procedure are in the 
|
|                      |        | PST         |                      |  results section.    
|
+----------------------+--------+-------------+----------------------+----------------------
+
| POCT GLUCOSE         | Routin | 2019  |                      |   Results for this   
|
|                      | e      |  4:56 PM    |                      | procedure are in the 
|
|                      |        | PST         |                      |  results section.    
|
+----------------------+--------+-------------+----------------------+----------------------
+
| EKG STANDARD 12 LEAD | STAT   | 2019  |                      |   Results for this   
|
|                      |        |  3:01 PM    |                      | procedure are in the 
|
|                      |        | PST         |                      |  results section.    
|
+----------------------+--------+-------------+----------------------+----------------------
+
| TROPONIN I           | Timed  | 2019  |                      |   Results for this   
|
|                      |        |  2:11 PM    |                      | procedure are in the 
|
|                      |        | PST         |                      |  results section.    
|
+----------------------+--------+-------------+----------------------+----------------------
+
| ED ISTAT TROPONIN    | STAT   | 2019  |                      |   Results for this   
|
|                      |        | 10:26 AM    |                      | procedure are in the 
|
|                      |        | PST         |                      |  results section.    
|
+----------------------+--------+-------------+----------------------+----------------------
+
| POC CARDIAC TROPONIN | Routin | 2019  |                      |   Results for this   
|
|                      | e      |  9:54 AM    |                      | procedure are in the 
|
|                      |        | PST         |                      |  results section.    
|
+----------------------+--------+-------------+----------------------+----------------------
+
| ED INFORMATION       | Routin | 2019  |                      |   Results for this   
|
| EXCHANGE             | e      |  9:47 AM    |                      | procedure are in the 
|
|                      |        | PST         |                      |  results section.    
|
+----------------------+--------+-------------+----------------------+----------------------
+
| EKG 12 LEAD UNIT     | Routin | 2019  |                      |   Results for this   
|
| PERFORMED            | e      |  9:44 AM    |                      | procedure are in the 
|
 
|                      |        | PST         |                      |  results section.    
|
+----------------------+--------+-------------+----------------------+----------------------
+
 from Last 3 Months
 
 Results
 X-ray foot right 3+ views (2019  2:58 PM)
 
+------------------------------------------------------------------------+--------------+
| Impressions                                                            | Performed At |
+------------------------------------------------------------------------+--------------+
|   Linear lucency/irregularity at the distal 1st phalanx, may represent |   [facility]     |
|   minimally displaced fracture.  Signed by: Oleksandr Lemus  Sign         | RADIOLOGY    |
| Date/Time: 2019 10:20 AM                                         |              |
+------------------------------------------------------------------------+--------------+
 
 
 
+------------------------------------------------------------------------+--------------+
| Narrative                                                              | Performed At |
+------------------------------------------------------------------------+--------------+
|   RIGHT FOOT THREE VIEWS  CLINICAL INFORMATION:  Possible fracture of  |   [facility]     |
| right hallux.  COMPARISON:  XR TOES RIGHT (2019); XR FOOT LEFT    | RADIOLOGY    |
| (2018);  FINDINGS:  No acute fracture or dislocation.  Small     |              |
| calcaneal plantar enthesophyte.  Linear lucency/irregularity at the    |              |
| distal 1st phalanx, may represent  minimally displaced fracture.  Mild |              |
|  midfoot degeneration.                                                 |              |
+------------------------------------------------------------------------+--------------+
 
 
 
+-------------------------------------------------------------------------+
| Procedure Note                                                          |
+-------------------------------------------------------------------------+
|   Lauro Baldwin Results In - 2019 10:23 AM PST  RIGHT FOOT THREE VIEWS |
| CLINICAL INFORMATION:                                                   |
| Possible fracture of right hallux.                                      |
| COMPARISON:                                                             |
| XR TOES RIGHT (2019); XR FOOT LEFT (2018);                   |
| FINDINGS:                                                               |
| No acute fracture or dislocation.                                       |
| Small calcaneal plantar enthesophyte.                                   |
| Linear lucency/irregularity at the distal 1st phalanx, may represent    |
| minimally displaced fracture.                                           |
| Mild midfoot degeneration.                                              |
| IMPRESSION:                                                             |
| Linear lucency/irregularity at the distal 1st phalanx, may represent    |
| minimally displaced fracture.                                           |
| Signed by: Oleksandr Lemus                                                 |
| Sign Date/Time: 2019 10:20 AM                                     |
+-------------------------------------------------------------------------+
 
 
 
+--------------------+------------------+--------------------+--------------+
| Performing         | Address          | City/State/Zipcode | Phone Number |
| Organization       |                  |                    |              |
+--------------------+------------------+--------------------+--------------+
|   [facility] RAD[facility] |   [address] | [place] [address] |              |
 
+--------------------+------------------+--------------------+--------------+
 X-ray foot left 3 + views (2019  2:58 PM)
 
+----------------------------------------------------------------------+--------------+
| Impressions                                                          | Performed At |
+----------------------------------------------------------------------+--------------+
|   No acute fracture.    No radiographic evidence for osteomyelitis   |   [facility]     |
| Signed by: Oleksandr Lemus Date/Time: 2019 10:21 AM         | RADIOLOGY    |
+----------------------------------------------------------------------+--------------+
 
 
 
+------------------------------------------------------------------------+--------------+
| Narrative                                                              | Performed At |
+------------------------------------------------------------------------+--------------+
|   LEFT FOOT THREE VIEWS  CLINICAL INFORMATION:  8 weeks post op,       |   [facility]     |
| forefoot amputation.  COMPARISON:  XR TOES RIGHT (2019); XR FOOT  | RADIOLOGY    |
| LEFT (2018); XR FOOT LEFT  (2018);  FINDINGS:  Amputation  |              |
| of the forefoot at the level of the proximal metacarpals.  No          |              |
| radiographic evidence for osteomyelitis.  No acute fractures.          |              |
+------------------------------------------------------------------------+--------------+
 
 
 
+------------------------------------------------------------------------+
| Procedure Note                                                         |
+------------------------------------------------------------------------+
|   Lauro Baldwin Results In - 2019 10:25 AM PST  LEFT FOOT THREE VIEWS |
| CLINICAL INFORMATION:                                                  |
| 8 weeks post op, forefoot amputation.                                  |
| COMPARISON:                                                            |
| XR TOES RIGHT (2019); XR FOOT LEFT (2018); XR FOOT LEFT     |
| (2018);                                                          |
| FINDINGS:                                                              |
| Amputation of the forefoot at the level of the proximal metacarpals.   |
| No radiographic evidence for osteomyelitis.                            |
| No acute fractures.                                                    |
| IMPRESSION:                                                            |
| No acute fracture.  No radiographic evidence for osteomyelitis         |
| Signed by: Oleksandr Lemus                                                |
| Sign Date/Time: 2019 10:21 AM                                    |
+------------------------------------------------------------------------+
 
 
 
+--------------------+------------------+--------------------+--------------+
| Performing         | Address          | City/State/Zipcode | Phone Number |
| Organization       |                  |                    |              |
+--------------------+------------------+--------------------+--------------+
|   [facility] RAD[facility]OLOG[facility] |   [address] | [place] [address] |              |
+--------------------+------------------+--------------------+--------------+
 POCT glucose (02/15/2019 11:36 AM)Only the most recent of 19 results within the time period
 is included.
 
+--------------------+--------------------------+---------------+-----------------+
| Component          | Value                    | Ref Range     | Performed At    |
+--------------------+--------------------------+---------------+-----------------+
| GLUCOSE,POC SCREEN | 231 (H)Comment: Testing  | 65 - 99 mg/dL | [facility] |
|                    | performed at [facility];[address]     |               |                 |
|                    | [address] [facility];[place], [place]   |               |                 |
 
|                    | [id number]                    |               |                 |
+--------------------+--------------------------+---------------+-----------------+
 
 
 
+-------------------+------------------+--------------------+--------------+
| Performing        | Address          | City/State/Zipcode | Phone Number |
| Organization      |                  |                    |              |
+-------------------+------------------+--------------------+--------------+
|   [facility] LAB[facility] |   [address] | [facility] [place] [address] |              |
+-------------------+------------------+--------------------+--------------+
 Septic Lactic Acid (2019 10:12 PM)Only the most recent of 3 results within the time krystian sparks is included.
 
+-------------+-------------------------+------------------+-----------------+
| Component   | Value                   | Ref Range        | Performed At    |
+-------------+-------------------------+------------------+-----------------+
| LACTIC ACID | 2.0Comment: Testing     | 0.4 - 2.0 mmol/L | [facility] |
|             | performed at [facility];[address]    |                  |                 |
|             | [address] [facility];[place]  |                  |                 |
|             | [id number]                   |                  |                 |
+-------------+-------------------------+------------------+-----------------+
 
 
 
+-------------------+------------------+--------------------+--------------+
| Performing        | Address          | City/State/Zipcode | Phone Number |
| Organization      |                  |                    |              |
+-------------------+------------------+--------------------+--------------+
|   [facility] LAB[facility] |   [address] | [place] [address] |              |
+-------------------+------------------+--------------------+--------------+
 Blood Culture Set 2 (2019  5:39 PM)Only the most recent of 2 results within the time 
period is included.
 
+----------------------+------------------------+-----------+-----------------+
| Component            | Value                  | Ref Range | Performed At    |
+----------------------+------------------------+-----------+-----------------+
| Specimen Description | BLOOD, PERIPHERAL DRAW |           | [facility]      |
|                      |                        |           | LABORATORY      |
+----------------------+------------------------+-----------+-----------------+
| SPECIAL REQUESTS     | R HAND                 |           | [facility] |
+----------------------+------------------------+-----------+-----------------+
| CULTURE              | NO GROWTH 6 DAYS       |           | [facility]      |
|                      |                        |           | LABORATORY      |
+----------------------+------------------------+-----------+-----------------+
 
 
 
+-----------------+
| Specimen        |
+-----------------+
| Blood - Blood,  |
| peripheral draw |
+-----------------+
 
 
 
+-------------------+-------------------------+---------------------+----------------+
| Performing        | Address                 | City/State/Zipcode  | Phone Number   |
| Organization      |                         |                     |                |
 
+-------------------+-------------------------+---------------------+----------------+
|   [facility]      |   [address]  | [place] [address] |   [phone] |
| LABORATORY        | [address]                   |                     |                |
+-------------------+-------------------------+---------------------+----------------+
|   [facility] |   [address]        | [place] [address]  |                |
+-------------------+-------------------------+---------------------+----------------+
 Sedimentation Rate (ESR) (2019  5:39 PM)Only the most recent of 3 results within the 
time period is included.
 
+-----------+-------------------------+--------------+-----------------+
| Component | Value                   | Ref Range    | Performed At    |
+-----------+-------------------------+--------------+-----------------+
| ESR       | 13Comment: Testing      | 0 - 30 mm/Hr | [facility] |
|           | performed at [facility]    |              |                 |
|           | [address];[place], [place]  |              |                 |
|           | [id number]                   |              |                 |
+-----------+-------------------------+--------------+-----------------+
 
 
 
+----------+
| Specimen |
+----------+
| Blood    |
+----------+
 
 
 
+-------------------+------------------+--------------------+--------------+
| Performing        | Address          | City/State/Zipcode | Phone Number |
| Organization      |                  |                    |              |
+-------------------+------------------+--------------------+--------------+
|   [facility] LAB[facility] |   [address] | [place] [address] |              |
+-------------------+------------------+--------------------+--------------+
 CBC with differential (2019  5:39 PM)Only the most recent of 4 results within the  period is included.
 
+-------------------+------------------------+-------------------+-----------------+
| Component         | Value                  | Ref Range         | Performed At    |
+-------------------+------------------------+-------------------+-----------------+
| WBC               | 5.14                   | 3.80 - 11.00 K/uL | [facility] |
+-------------------+------------------------+-------------------+-----------------+
| RBC               | 2.91 (L)               | 3.70 - 5.10 M/uL  | [facility] |
+-------------------+------------------------+-------------------+-----------------+
| HGB               | 10.1 (L)               | 11.3 - 15.5 g/dL  | [facility] |
+-------------------+------------------------+-------------------+-----------------+
| HCT               | 30.9 (L)               | 34.0 - 46.0 %     | [facility] |
+-------------------+------------------------+-------------------+-----------------+
| MCV               | 106.1 (H)              | 80.0 - 100.0 fl   | [facility] |
+-------------------+------------------------+-------------------+-----------------+
| MCH               | 34.8 (H)               | 27.0 - 34.0 pg    | [facility] |
+-------------------+------------------------+-------------------+-----------------+
| MCHC              | 32.8                   | 32.0 - 35.5 g/dL  | [facility] |
+-------------------+------------------------+-------------------+-----------------+
| RDW SD            | 61.3 (H)               | 37 - 53 fl        | [facility] |
+-------------------+------------------------+-------------------+-----------------+
| PLT               | 145 (L)                | 150 - 400 K/uL    | [facility] |
+-------------------+------------------------+-------------------+-----------------+
| MPV               | 9.3                    | fl                | [facility] |
+-------------------+------------------------+-------------------+-----------------+
 
| DIFF TYPE         | AUTOMATED              |                   | [facility] |
+-------------------+------------------------+-------------------+-----------------+
| NEUTROPHILS       | 74.03                  | %                 | [facility] |
+-------------------+------------------------+-------------------+-----------------+
| LYMPHOCYTES       | 20.20                  | %                 | [facility] |
+-------------------+------------------------+-------------------+-----------------+
| MONOCYTES         | 5.16                   | %                 | [facility] |
+-------------------+------------------------+-------------------+-----------------+
| EOSINOPHILS       | 0.03                   | %                 | [facility] |
+-------------------+------------------------+-------------------+-----------------+
| BASOPHILS         | 0.58                   | %                 | [facility] |
+-------------------+------------------------+-------------------+-----------------+
| NEUTROPHILS ABS   | 3.81                   | 1.90 - 7.40 K/uL  | [facility] |
+-------------------+------------------------+-------------------+-----------------+
| LYMPHOCYTES ABS   | 1.04                   | 1.00 - 3.90 K/uL  | [facility] |
+-------------------+------------------------+-------------------+-----------------+
| MONOCYTES ABS     | 0.27                   | 0.00 - 0.80 K/uL  | [facility] |
+-------------------+------------------------+-------------------+-----------------+
| EOSINOPHILS ABS   | 0.00                   | 0.00 - 0.50 K/uL  | [facility] |
+-------------------+------------------------+-------------------+-----------------+
| BASOPHILS ABS     | 0.03                   | 0.00 - 0.10 K/uL  | [facility] |
+-------------------+------------------------+-------------------+-----------------+
| MORPHOLOGY        | 1+                     |                   | [facility] |
|                   | Comment:               |                   |                 |
|                   | ANISO                  |                   |                 |
|                   | 1+                     |                   |                 |
|                   | MACRO                  |                   |                 |
|                   | NORMAL PLT MORPH       |                   |                 |
|                   |                        |                   |                 |
+-------------------+------------------------+-------------------+-----------------+
| Platelet Estimate | DECREASEDComment:      |                   | [facility] |
|                   | Testing performed at   |                   |                 |
|                   | [facility];[address] [place] [address]          |                   |                 |
|                   | Blvd;[place] [address] |                   |                 |
+-------------------+------------------------+-------------------+-----------------+
 
 
 
+----------+
| Specimen |
+----------+
| Blood    |
+----------+
 
 
 
+-------------------+------------------+--------------------+--------------+
| Performing        | Address          | City/State/Zipcode | Phone Number |
| Organization      |                  |                    |              |
+-------------------+------------------+--------------------+--------------+
|   [facility] |   [address] | [place] [address] |              |
+-------------------+------------------+--------------------+--------------+
 C-Reactive Protein (2019  5:39 PM)Only the most recent of 3 results within the time krystian sparks is included.
 
+-----------+--------------------------+------------+-----------------+
| Component | Value                    | Ref Range  | Performed At    |
+-----------+--------------------------+------------+-----------------+
| CRP       | 0.8 (H)Comment: Testing  | <0.5 mg/dL | [facility] |
|           | performed at [facility];[address]     |            |                 |
 
|           | [address];[place]   |            |                 |
|           | [id number]                    |            |                 |
+-----------+--------------------------+------------+-----------------+
 
 
 
+----------+
| Specimen |
+----------+
| Blood    |
+----------+
 
 
 
+-------------------+------------------+--------------------+--------------+
| Performing        | Address          | City/State/Zipcode | Phone Number |
| Organization      |                  |                    |              |
+-------------------+------------------+--------------------+--------------+
|   [facility] |   [address] | [place] [address] |              |
+-------------------+------------------+--------------------+--------------+
 TSH (2019  5:39 PM)
 
+-----------+-------------------------+----------------------+-----------------+
| Component | Value                   | Ref Range            | Performed At    |
+-----------+-------------------------+----------------------+-----------------+
| TSH       | 0.904Comment: Testing   | 0.450 - 5.100 uIU/mL | [facility] |
|           | performed at [facility]    |                      |                 |
|           | [address];[place], [place]  |                      |                 |
|           | [address]                   |                      |                 |
+-----------+-------------------------+----------------------+-----------------+
 
 
 
+----------+
| Specimen |
+----------+
| Blood    |
+----------+
 
 
 
+-------------------+------------------+--------------------+--------------+
| Performing        | Address          | City/State/Zipcode | Phone Number |
| Organization      |                  |                    |              |
+-------------------+------------------+--------------------+--------------+
|   [facility] |   [address] | [place] [address] |              |
+-------------------+------------------+--------------------+--------------+
 Folate (2019  5:39 PM)
 
+-----------+--------------------------+------------+--------------+
| Component | Value                    | Ref Range  | Performed At |
+-----------+--------------------------+------------+--------------+
| FOLATE    | 8.0Comment: Testing      | >5.4 ng/mL | [facility]   |
|           | performed at [facility], [address] |            | LABORATORY   |
|           |  [address] [place],        |            |              |
|           | [place]  [id number]     |            |              |
+-----------+--------------------------+------------+--------------+
 
 
 
 
+----------+
| Specimen |
+----------+
| Blood    |
+----------+
 
 
 
+---------------+-------------------------+---------------------+----------------+
| Performing    | Address                 | City/State/Zipcode  | Phone Number   |
| Organization  |                         |                     |                |
+---------------+-------------------------+---------------------+----------------+
|   [id number]  |   [address]  | [place] [address] |   [phone] |
| LABORATORY    | [address]                   |                     |                |
+---------------+-------------------------+---------------------+----------------+
 Vitamin B12 (2019  5:39 PM)
 
+-------------+--------------------------+-------------------+--------------+
| Component   | Value                    | Ref Range         | Performed At |
+-------------+--------------------------+-------------------+--------------+
| VITAMIN B12 | 553Comment: Testing      | 254 - 1,320 pg/mL | [facility]   |
|             | performed at [facility], [address] |                   | LABORATORY   |
|             |  [address],        |                   |              |
|             | [place]  [id number]     |                   |              |
+-------------+--------------------------+-------------------+--------------+
 
 
 
+----------+
| Specimen |
+----------+
| Blood    |
+----------+
 
 
 
+---------------+-------------------------+---------------------+----------------+
| Performing    | Address                 | City/State/Zipcode  | Phone Number   |
| Organization  |                         |                     |                |
+---------------+-------------------------+---------------------+----------------+
|   [facility]  |   [address]  | [place] [address] |   [phone] |
| LABORATORY    | [address]                   |                     |                |
+---------------+-------------------------+---------------------+----------------+
 Comprehensive metabolic panel (2019  5:39 PM)Only the most recent of 3 results within
 the time period is included.
 
+----------------+--------------------------+-------------------+-----------------+
| Component      | Value                    | Ref Range         | Performed At    |
+----------------+--------------------------+-------------------+-----------------+
| SODIUM         | 143                      | 135 - 145 mmol/L  | [facility] |
+----------------+--------------------------+-------------------+-----------------+
| POTASSIUM      | 4.7                      | 3.5 - 4.9 mmol/L  | [facility] |
+----------------+--------------------------+-------------------+-----------------+
| CHLORIDE       | 96 (L)                   | 99 - 109 mmol/L   | [facility] |
+----------------+--------------------------+-------------------+-----------------+
| CO2            | >40 (HH)Comment: CALLED  | 23 - 32 mmol/L    | [facility] |
|                | NURSING UNITREAD BACK    |                   |                 |
|                | RESULTS VERIFIEDCALLED   |                   |                 |
|                | TO RN IN ED AT 1830 BY   |                   |                 |
|                | LGJ                      |                   |                 |
 
|                |                          |                   |                 |
+----------------+--------------------------+-------------------+-----------------+
| ANION GAP AGAP | UNABLE TO CALCULATE      | 5 - 20 mmol/L     | [facility] |
+----------------+--------------------------+-------------------+-----------------+
| GLUCOSE        | 160 (H)                  | 65 - 99 mg/dL     | [facility] |
+----------------+--------------------------+-------------------+-----------------+
| BUN            | 9                        | 8 - 25 mg/dL      | [facility] |
+----------------+--------------------------+-------------------+-----------------+
| CREATININE     | 2.96 (H)                 | 0.50 - 1.00 mg/dL | [facility] |
+----------------+--------------------------+-------------------+-----------------+
| BUN/CREAT      | 3                        |                   | [facility] |
+----------------+--------------------------+-------------------+-----------------+
| CALCIUM        | 9.4                      | 8.5 - 10.5 mg/dL  | [facility] |
+----------------+--------------------------+-------------------+-----------------+
| TOTAL PROTEIN  | 6.7                      | 6.3 - 8.2 g/dL    | [facility] |
+----------------+--------------------------+-------------------+-----------------+
| Albumin        | 4.3                      | 3.3 - 4.8 g/dL    | [facility] |
+----------------+--------------------------+-------------------+-----------------+
| GLOBULIN       | 2.4                      | 1.3 - 4.9 g/dL    | [facility] |
+----------------+--------------------------+-------------------+-----------------+
| A/G            | 1.8                      | 1.0 - 2.4         | [facility] |
+----------------+--------------------------+-------------------+-----------------+
| TBIL           | 0.5                      | 0.1 - 1.5 mg/dL   | [facility] |
+----------------+--------------------------+-------------------+-----------------+
| ALK PHOS       | 103                      | 35 - 115 U/L      | [facility] |
+----------------+--------------------------+-------------------+-----------------+
| AST            | 54 (H)                   | 10 - 45 U/L       | [facility] |
+----------------+--------------------------+-------------------+-----------------+
| ALT            | 40                       | 10 - 65 U/L       | [facility] |
+----------------+--------------------------+-------------------+-----------------+
| EGFR           | 16 (L)Comment: GFR <60:  | >60 mL/min/1.73m2 | [facility]AT[facility] |
|                | CHRONIC KIDNEY DISEASE,  |                   |                 |
|                | IF FOUND OVER A 3 MONTH  |                   |                 |
|                | PERIOD.GFR <15: KIDNEY   |                   |                 |
|                | FAILURE.FOR       |                   |                 |
|                | AMERIC[employer], MULTIPLY THE  |                   |                 |
|                | CALCULATED GFR BY        |                   |                 |
|                | 1.210.This eGFR is       |                   |                 |
|                | calculated using the     |                   |                 |
|                | MDRD IDMS traceable      |                   |                 |
|                | equation.Testing         |                   |                 |
|                | performed at [facility];[address]     |                   |                 |
|                | [address];[place], [place]   |                   |                 |
|                | [id number]                    |                   |                 |
+----------------+--------------------------+-------------------+-----------------+
 
 
 
+----------+
| Specimen |
+----------+
| Blood    |
+----------+
 
 
 
+-------------------+------------------+--------------------+--------------+
| Performing        | Address          | City/State/Zipcode | Phone Number |
| Organization      |                  |                    |              |
+-------------------+------------------+--------------------+--------------+
 
|   [facility] |   [address] | [place] [address] |              |
+-------------------+------------------+--------------------+--------------+
 XR Toe Right 2 View (2019  5:08 PM)
 
+----------------------------------------------------------------------+--------------+
| Impressions                                                          | Performed At |
+----------------------------------------------------------------------+--------------+
|   No radiographic evidence of osteomyelitis seen.    If further      |   [facility]     |
| assessment  is warranted, consider correlation with MRI.  Potential  | RADIOLOGY    |
| acute fracture through the base of the distal phalanx.  Signed by:   |              |
| Gavin South Date/Time: 2019 5:15 PM                      |              |
+----------------------------------------------------------------------+--------------+
 
 
 
+------------------------------------------------------------------------+--------------+
| Narrative                                                              | Performed At |
+------------------------------------------------------------------------+--------------+
|   RIGHT TOE  CLINICAL INFORMATION:  Other (see comments) Pain, concern |   [facility]     |
|  for osteomyelitis.  COMPARISON:  XR FOOT LEFT (2018); XR FOOT   | RADIOLOGY    |
| LEFT (2018); XR FOOT LEFT  (2018);  FINDINGS:  Advanced     |              |
| degenerative changes identified involving the interphalangeal  joint   |              |
| of the 1st digit.    On the lateral view, there appears to be  lucency |              |
|  through the base of the phalanx, potentially reflecting an  acute     |              |
| fracture.    No erosive or destructive changes appreciated to  suggest |              |
|  osteomyelitis.                                                        |              |
+------------------------------------------------------------------------+--------------+
 
 
 
+------------------------------------------------------------------------+
| Procedure Note                                                         |
+------------------------------------------------------------------------+
|   Denny, Rad Results In - 2019  5:18 PM PST  RIGHT TOE             |
| CLINICAL INFORMATION:                                                  |
| Other (see comments) Pain, concern for osteomyelitis.                  |
| COMPARISON:                                                            |
| XR FOOT LEFT (2018); XR FOOT LEFT (2018); XR FOOT LEFT     |
| (2018);                                                           |
| FINDINGS:                                                              |
| Advanced degenerative changes identified involving the interphalangeal |
| joint of the 1st digit.  On the lateral view, there appears to be      |
| lucency through the base of the phalanx, potentially reflecting an     |
| acute fracture.  No erosive or destructive changes appreciated to      |
| suggest osteomyelitis.                                                 |
| IMPRESSION:                                                            |
| No radiographic evidence of osteomyelitis seen.  If further assessment |
| is warranted, consider correlation with MRI.                           |
| Potential acute fracture through the base of the distal phalanx.       |
| Signed by: Gavin South                                                 |
| Sign Date/Time: 2019 5:15 PM                                     |
+------------------------------------------------------------------------+
 
 
 
+--------------------+------------------+--------------------+--------------+
| Performing         | Address          | City/State/Zipcode | Phone Number |
| Organization       |                  |                    |              |
+--------------------+------------------+--------------------+--------------+
|   [facility] RAD[facility]OLOG[facility] |   [address] | [place] [address] |              |
 
+--------------------+------------------+--------------------+--------------+
 Blood Culture Set 1 (2019  4:55 PM)Only the most recent of 2 results within the time 
period is included.
 
+----------------------+------------------+-----------+-----------------+
| Component            | Value            | Ref Range | Performed At    |
+----------------------+------------------+-----------+-----------------+
| Specimen Description | BLOOD, LINE DRAW |           | [facility]      |
|                      |                  |           | LABORATORY      |
+----------------------+------------------+-----------+-----------------+
| SPECIAL REQUESTS     | R FOREARM        |           | [facility] |
+----------------------+------------------+-----------+-----------------+
| CULTURE              | NO GROWTH 6 DAYS |           | [facility]      |
|                      |                  |           | LABORATORY      |
+----------------------+------------------+-----------+-----------------+
 
 
 
+---------------------+
| Specimen            |
+---------------------+
| Blood - Blood Line  |
| Draw                |
+---------------------+
 
 
 
+-------------------+-------------------------+---------------------+----------------+
| Performing        | Address                 | City/State/Zipcode  | Phone Number   |
| Organization      |                         |                     |                |
+-------------------+-------------------------+---------------------+----------------+
|   [facility]      |   [address]  | [place] [address] |   [phone] |
| LABORATORY        | [address]                   |                     |                |
+-------------------+-------------------------+---------------------+----------------+
|   [facility] |   [address]        | [place] [address]  |                |
+-------------------+-------------------------+---------------------+----------------+
 ED INFORMATION EXCHANGE (2019  3:21 PM)Only the most recent of 4 results within the  period is included.
 
+------------------------------------------------------------------------+--------------+
| Narrative                                                              | Performed At |
+------------------------------------------------------------------------+--------------+
|   [id number]     Criteria Met         Care        |   ED         |
| Guidelines      10 in 12     Security and Safety  No recent Security   | INFORMATION  |
| Events currently on file     ED Care Guidelines from [facility]        | EXCHANGE     |
| [place]  Last Updated: 18 10:16 AM         Other Information:    |              |
| Currently being seen by [facility] in [place] for mental health        |              |
| [phone]  These are guidelines and the provider should    |              |
| exercise clinical judgment when providing care.  ED Care Guidelines    |              |
| from [facility]  Last Updated: 17 10:44 AM         Care |              |
|  Coordination:  This patient has been identified as having at          |              |
| leastEmergency Departments visits in the 12 months immediately         |              |
| preceding the date these guidelines were entered.Patient requires      |              |
| education on appropriate ED usage.Emphasize the importance of using    |              |
| outpatient   medical services for the treatment of chronic conditions. |              |
|   Please contact Community Health WorkerWillard at [phone] if   |              |
| patient is seen in ED.  These are guidelines and the provider should   |              |
| exercise clinical judgment when providing care.  These are guidelines  |              |
| and the provider should exercise clinical judgment when providing      |              |
| care.           Prescription Drug Report (12 Mo.)  PDMP query found no |              |
 
|  report.        E.D. Visit Count (12 mo.)  Facility Visits Low Acuity  |              |
|   [facility] 18 0   [facility] 2 0   [facility]   |              |
| [facility] 5 0   Total 25 0   Note: Visits indicate total |              |
|  known visits. Medicaid Low Acuity Dx are the number of primary        |              |
| diagnoses on the Medicaid's Low Acuity dx list.         Recent         |              |
| Emergency Department Visit Summary  Showing 10 most recent visits out  |              |
| of 25 in the past 12 months  Date Facility City State Type Diagnoses   |              |
| or Chief Complaint   2019 [facility]       |              |
| Emergency       2019 [facility]            |              |
| Emergency          Multiple Falls        Referral        Circulatory   |              |
| Problem      2019 [facility] [place] Emergency      |              |
|      ABD PAIN        Other chest pain      2019 [facility]         |              |
| [facility] Emergency          Foot Pain      2019 |              |
|  [facility] Emergency          Chest Pain          |              |
| Nausea        Shortness of Breath        Chest pain, unspecified       |              |
|      Elevated blood-pressure reading, without diagnosis of             |              |
| hypertension        Presence of aortocoronary bypass graft             |              |
| Other specified postprocedural states      2019 [facility]  |              |
| Health [place] Emergency          CHEST PAIN        Abnormal levels  |              |
| of other serum enzymes        Personal history of other diseases of    |              |
| the circulatory system        Chest pain, unspecified      2019 |              |
|  [facility] [place] Emergency          FISTULA BLEEDING    |              |
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
| grafts, initial encounter      Dec 22, 2018 [facility]       |              |
| [place] Emergency          CHEST PAIN        Type 2 diabetes         |              |
| mellitus with hyperglycemia        Chest pain, unspecified      Nov    |              |
| 2018 [facility] Emergency    Chief Complaint:   |              |
| SOB      2018 [facility] [place] Emergency         |              |
|     FALL        Unspecified fall, initial encounter        Dependence  |              |
| on renal dialysis        End stage renal disease            Recent     |              |
| Inpatient Visit Summary  Showing 10 most recent visits out of 11 in    |              |
| the past 12 months  Date Facility City State Type Diagnoses or Chief   |              |
| Complaint   2019 [facility] Vascular       |              |
| Surgery          Foot Pain        End stage renal disease              |              |
| Dependence on renal dialysis        Peripheral vascular disease,       |              |
| unspecified        Anemia in chronic kidney disease        Other       |              |
| disorders of plasma-protein metabolism, not elsewhere classified       |              |
|      Other specified personal risk factors, not elsewhere classified   |              |
|     Dec 27, 2018 [facility] Recovery               |              |
|     Peripheral arterial disease, osteomyelitis left foot        Other  |              |
| acute osteomyelitis, left ankle and foot        Long term (current)    |              |
| use of antibiotics        End stage renal disease        Anemia in     |              |
| chronic kidney disease      Dec 5, 2018 [facility] |              |
|  General Medicine          Peripheral vascular disease, unspecified    |              |
|      End stage renal disease        Dependence on renal dialysis       |              |
|      Long term (current) use of insulin        Other chronic           |              |
| osteomyelitis, left ankle and foot        Type 2 diabetes mellitus     |              |
| with diabetic chronic kidney disease        Essential (primary)        |              |
| hypertension      2018 [facility]          |              |
| Inpatient          Upper GIB        Other chronic osteomyelitis,       |              |
| unspecified ankle and foot        End stage renal disease        Other |              |
|  specified personal risk factors, not elsewhere classified             |              |
| Chronic systolic (congestive) heart failure        Anemia in chronic   |              |
| kidney disease        Other disorders of plasma-protein metabolism,    |              |
| not elsewhere classified        Moderate protein-calorie malnutrition  |              |
|        Other disorders of phosphorus metabolism      2018      |              |
| [facility] [place] [facility]          1. Type 2      |              |
| diabetes mellitus with other specified complication        2. Urinary  |              |
| tract infection, site not specified        3. Unspecified              |              |
| protein-calorie malnutrition        4. Other chronic osteomyelitis,    |              |
 
| unspecified site        5. Hypertensive heart and chronic kidney       |              |
| disease with heart failure and with stage 5 chronic kidney disease, or |              |
|  end stage renal disease        6. Chronic diastolic (congestive)      |              |
| heart failure        7. Other osteomyelitis, ankle and foot        8.  |              |
| Type 2 diabetes mellitus with foot ulcer        9. Atherosclerotic     |              |
| heart disease of native coronary artery without angina pectoris        |              |
|  10. Chronic obstructive pulmonary disease, unspecified      ,    |              |
|  [facility]          1. Benign |              |
|  paroxysmal vertigo, unspecified ear        2. End stage renal disease |              |
|         3. Hypertensive chronic kidney disease with stage 5 chronic    |              |
| kidney disease or end stage renal disease        4. Urinary tract      |              |
| infection, site not specified        5. Hypertensive heart and chronic |              |
|  kidney disease with heart failure and with stage 5 chronic kidney     |              |
| disease, or end stage renal disease        6. Kidney transplant status |              |
|         7. Unspecified systolic (congestive) heart failure        8.   |              |
| Syncope and collapse        9. Type 2 diabetes mellitus with diabetic  |              |
| chronic kidney disease        10. Atherosclerotic heart disease of     |              |
| native coronary artery without angina pectoris      Sep 15, 2018       |              |
| [facility] [place]. [place] [facility]          Acute     |              |
| ischemic heart disease, unspecified        Long term (current) use of  |              |
| insulin        Dependence on renal dialysis        End stage renal     |              |
| disease        Type 2 diabetes mellitus with diabetic chronic kidney   |              |
| disease        Essential (primary) hypertension        Anemia in       |              |
| chronic kidney disease      2018 [facility] [facility] |              |
|  Medical Surgical          0. Cough        1. Pneumonia due to         |              |
| Pseudomonas        2. End stage renal disease        3. Hypertensive   |              |
| heart and chronic kidney disease with heart failure and with stage 5   |              |
| chronic kidney disease, or end stage renal disease        4. Cachexia  |              |
|        5. Chronic obstructive pulmonary disease with acute lower       |              |
| respiratory infection        6. Type 2 diabetes mellitus with diabetic |              |
|  chronic kidney disease        7. Heart failure, unspecified        8. |              |
|  Hyperlipidemia, unspecified        9. Gastro-esophageal reflux        |              |
| disease without esophagitis      2018 [facility]       |              |
| [facility] Medical Surgical          0. Other chest pain        1.      |              |
| Gastro-esophageal reflux disease without esophagitis        2. End     |              |
| stage renal disease        3. Hypertensive heart and chronic kidney    |              |
| disease with heart failure and with stage 5 chronic kidney disease, or |              |
|  end stage renal disease        4. Chronic systolic (congestive) heart |              |
|  failure        5. Atherosclerotic heart disease of native coronary    |              |
| artery with other forms of angina pectoris        6. Type 2 diabetes   |              |
| mellitus with diabetic chronic kidney disease        7.                |              |
| Hyperlipidemia, unspecified        8. Major depressive disorder,       |              |
| single episode, unspecified        9. Chronic obstructive pulmonary    |              |
| disease, unspecified      Mar 6, 2018 [facility]     |              |
| [facility] Cardiology          Heart Failure        Need for iv access  |              |
|        Type 2 diabetes mellitus with other specified complication      |              |
|      Long term (current) use of insulin        Paroxysmal atrial       |              |
| fibrillation        Anemia in chronic kidney disease                   |              |
| Atherosclerotic heart disease of native coronary artery without angina |              |
|  pectoris        Cardiomyopathy, unspecified        End stage renal    |              |
| disease        Other specified postprocedural states            Care   |              |
| Providers  Provider PRC Type Phone Fax Service Dates   HEADINGS, SANTIAGO, |              |
|  F.N.P. Nurse Practitioner: Family     Current      Marco Antonio Martinez     |              |
| /Care Coordinator [phone]    2019 -          |              |
| Current      [facility] Primary [facility] [phone]    2019 |              |
|  - Current      [facility] Primary            |              |
| Care    [phone] Current      [facility]      |              |
| [place] [facility]     Current      MANOLO GONZALEZ Primary Care         |              |
| Current      [facility] Provider [phone] |              |
|  [phone] Current      [facility] Case or Care Manager     Current      |              |
 
|  MIRTA Goel Case or Care Manager [phone]  |              |
| [phone] Current      Jairo Gutierrez MD Other     Current     |              |
|      [facility] Health  This patient has registered at the [facility]      |              |
| [facility] Emergency Department   For more information    |              |
| visit:                                                                 |              |
| [email or web address] |              |
| [id number]   The above information is provided for the sole purpose of     |              |
| patient treatment. Use of this information beyond the terms of Data    |              |
| Sharing Memorandum of Understanding and License Agreement is           |              |
| prohibited. In certain cases not all visits may be represented.        |              |
| Consult the aforementioned facilities for additional information.      |              |
| 2019 [employer] - [place], [place] -      |              |
| [email or web address]                                         |              |
+------------------------------------------------------------------------+--------------+
 
 
 
+-------------------------------------------------------------------------------------------
--------------------------------------------------------------------------------------------
--------------------+
| Procedure Note                                                                            
                                                                                            
                    |
+-------------------------------------------------------------------------------------------
--------------------------------------------------------------------------------------------
--------------------+
|   Interface, Lab - 2019  3:22 PM PST  Formatting of this note may be different      
                                                                                            
                    |
| from the original.[id number] Met  Care Guidelines  10 in     
                                                                                            
                    |
| 12Security and SafetyNo recent Security Events currently on fileED Care Guidelines from   
                                                                                            
                    |
| [employer]  Antoinette Updated: 18 10:16 AM  Other Information:Currently being      
                                                                                            
                    |
| seen by [facility] in [place] for mental health concerns.[phone] are            
                                                                                            
                    |
| guidelines and the provider should exercise clinical judgment when providing care.ED      
                                                                                            
                    |
| Care Guidelines from [facility] Updated: 17 10:44 AM  Care             
                                                                                            
                    |
| Coordination:This patient has been identified as having at leastEmergency Departments     
                                                                                            
                    |
| visits in the 12 months immediately preceding the date these guidelines were              
                                                                                            
                    |
| entered.Patient requires education on appropriate ED usage.Emphasize the importance of    
                                                                                            
                    |
| using outpatient medical services for the treatment of chronic conditions.Please contact  
                                                                                            
                    |
|  Community Health WorkerWillard at [phone] if patient is seen in ED.These are      
 
                                                                                            
                    |
| guidelines and the provider should exercise clinical judgment when providing care.These   
                                                                                            
                    |
| are guidelines and the provider should exercise clinical judgment when providing          
                                                                                            
                    |
| care.Prescription Drug Report (12 Mo.)PDMP query found no report.E.D. Visit Count (12     
                                                                                            
                    |
| mo.)Facility Visits Low Acuity [facility] 18 0 [facility] 2 0    
                                                                                            
                    |
| [facility] 5 0 Total 25 0 Note: Visits indicate total known visits.   
                                                                                            
                    |
| Medicaid Low Acuity Dx are the number of primary diagnoses on the Medicaid's Low Acuity   
                                                                                            
                    |
| dx list.  Recent Emergency Department Visit SummaryShowing 10 most recent visits out of   
                                                                                            
                    |
| 25 in the past 12 monthsDate Facility City State Type Diagnoses or Chief Complaint Feb    
                                                                                            
                    |
| 2019 [facility] [place] Emergency   2019 [facility] [place]     
                                                                                            
                    |
| [place] Emergency    Multiple Falls    Referral    Circulatory Problem  2019     
                                                                                            
                    |
| [facility] [place] Emergency    ABD PAIN    Other chest pain  2019    
                                                                                            
                    |
| [facility] [place] Emergency    Foot Pain  2019 [facility] [place]  
                                                                                            
                    |
|  [place] Emergency    Chest Pain    Nausea    Shortness of Breath    Chest pain,        
                                                                                            
                    |
| unspecified    Elevated blood-pressure reading, without diagnosis of hypertension         
                                                                                            
                    |
| Presence of aortocoronary bypass graft    Other specified postprocedural states  ,  
                                                                                            
                    |
|   [facility] [place] Emergency    CHEST PAIN    Abnormal levels of other  
                                                                                            
                    |
|  serum enzymes    Personal history of other diseases of the circulatory system    Chest   
                                                                                            
                    |
| pain, unspecified  2019 [facility] [place] Emergency    FISTULA        
                                                                                            
                    |
| BLEEDING    Hemorrhage due to vascular prosthetic devices, implants and grafts, initial   
                                                                                            
                    |
| encounter  Dec 22, 2018 [facility] [place] Emergency    CHEST PAIN    Type 2  
 
                                                                                            
                    |
|  diabetes mellitus with hyperglycemia    Chest pain, unspecified  2018 [facility]      
                                                                                            
                    |
| [place] Emergency  Chief Complaint: SOB  2018 [facility]       
                                                                                            
                    |
| [facility] [place] Emergency    FALL    Unspecified fall, initial encounter    Dependence   
                                                                                            
                    |
| on renal dialysis    End stage renal disease  Recent Inpatient Visit SummaryShowing 10    
                                                                                            
                    |
| most recent visits out of 11 in the past 12 monthsDate Facility City State Type           
                                                                                            
                    |
| Diagnoses or Chief Complaint 2019 [facility] [place] Vascular         
                                                                                            
                    |
| Surgery    Foot Pain    End stage renal disease    Dependence on renal dialysis           
                                                                                            
                    |
| Peripheral vascular disease, unspecified    Anemia in chronic kidney disease    Other     
                                                                                            
                    |
| disorders of plasma-protein metabolism, not elsewhere classified    Other specified       
                                                                                            
                    |
| personal risk factors, not elsewhere classified  Dec 27, 2018 [facility] [place]        
                                                                                            
                    |
| [place] Recovery    Peripheral arterial disease, osteomyelitis left foot    Other       
                                                                                            
                    |
| acute osteomyelitis, left ankle and foot    Long term (current) use of antibiotics        
                                                                                            
                    |
| End stage renal disease    Anemia in chronic kidney disease  Dec 5, 2018 [facility]  
                                                                                            
                    |
|  [place] General Medicine    Peripheral vascular disease, unspecified    End       
                                                                                            
                    |
| stage renal disease    Dependence on renal dialysis    Long term (current) use of         
                                                                                            
                    |
| insulin    Other chronic osteomyelitis, left ankle and foot    Type 2 diabetes mellitus   
                                                                                            
                    |
| with diabetic chronic kidney disease    Essential (primary) hypertension  [facility]    
                                                                                            
                    |
[facility] [place] [place] [facility]    Upper GIB    Other chronic osteomyelitis,     
                                                                                            
                    |
| unspecified ankle and foot    End stage renal disease    Other specified personal risk    
                                                                                            
                    |
| factors, not elsewhere classified    Chronic systolic (congestive) heart failure          
 
                                                                                            
                    |
| Anemia in chronic kidney disease    Other disorders of plasma-protein metabolism, not     
                                                                                            
                    |
| elsewhere classified    Moderate protein-calorie malnutrition    Other disorders of       
                                                                                            
                    |
| phosphorus metabolism  2018 [facility] [place] Medical Surgical    1.  
                                                                                            
                    |
|  Type 2 diabetes mellitus with other specified complication    2. Urinary tract           
                                                                                            
                    |
| infection, site not specified    3. Unspecified protein-calorie malnutrition    4. Other  
                                                                                            
                    |
|  chronic osteomyelitis, unspecified site    5. Hypertensive heart and chronic kidney      
                                                                                            
                    |
| disease with heart failure and with stage 5 chronic kidney disease, or end stage renal    
                                                                                            
                    |
| disease    6. Chronic diastolic (congestive) heart failure    7. Other osteomyelitis,     
                                                                                            
                    |
| ankle and foot    8. Type 2 diabetes mellitus with foot ulcer    9. Atherosclerotic       
                                                                                            
                    |
| heart disease of native coronary artery without angina pectoris    10. Chronic            
                                                                                            
                    |
| obstructive pulmonary disease, unspecified  2018 [facility] [place]     
                                                                                            
                    |
| Medical Surgical    1. Benign paroxysmal vertigo, unspecified ear    2. End stage renal   
                                                                                            
                    |
| disease    3. Hypertensive chronic kidney disease with stage 5 chronic kidney disease or  
                                                                                            
                    |
|  end stage renal disease    4. Urinary tract infection, site not specified    5.          
                                                                                            
                    |
| Hypertensive heart and chronic kidney disease with heart failure and with stage 5         
                                                                                            
                    |
| chronic kidney disease, or end stage renal disease    6. Kidney transplant status    7.   
                                                                                            
                    |
| Unspecified systolic (congestive) heart failure    8. Syncope and collapse    9. Type 2   
                                                                                            
                    |
| diabetes mellitus with diabetic chronic kidney disease    10. Atherosclerotic heart       
                                                                                            
                    |
| disease of native coronary artery without angina pectoris  Sep 15, 2018 [facility]    
                                                                                            
                    |
| [place] Medical Surgical    Acute ischemic heart disease, unspecified         
 
                                                                                            
                    |
| Long term (current) use of insulin    Dependence on renal dialysis    End stage renal     
                                                                                            
                    |
| disease    Type 2 diabetes mellitus with diabetic chronic kidney disease    Essential     
                                                                                            
                    |
| (primary) hypertension    Anemia in chronic kidney disease  2018 [facility]            
                                                                                            
                    |
| [place] Medical Surgical    0. Cough    1. Pneumonia due to Pseudomonas   
                                                                                            
                    |
|    2. End stage renal disease    3. Hypertensive heart and chronic kidney disease with    
                                                                                            
                    |
| heart failure and with stage 5 chronic kidney disease, or end stage renal disease    4.   
                                                                                            
                    |
| Cachexia    5. Chronic obstructive pulmonary disease with acute lower respiratory         
                                                                                            
                    |
| infection    6. Type 2 diabetes mellitus with diabetic chronic kidney disease    7.       
                                                                                            
                    |
| Heart failure, unspecified    8. Hyperlipidemia, unspecified    9. Gastro-esophageal      
                                                                                            
                    |
| reflux disease without esophagitis  2018 [place] Medical     
                                                                                            
                    |
| Surgical    0. Other chest pain    1. Gastro-esophageal reflux disease without            
                                                                                            
                    |
| esophagitis    2. End stage renal disease    3. Hypertensive heart and chronic kidney     
                                                                                            
                    |
| disease with heart failure and with stage 5 chronic kidney disease, or end stage renal    
                                                                                            
                    |
| disease    4. Chronic systolic (congestive) heart failure    5. Atherosclerotic heart     
                                                                                            
                    |
| disease of native coronary artery with other forms of angina pectoris    6. Type 2        
                                                                                            
                    |
| diabetes mellitus with diabetic chronic kidney disease    7. Hyperlipidemia, unspecified  
                                                                                            
                    |
|     8. Major depressive disorder, single episode, unspecified    9. Chronic obstructive   
                                                                                            
                    |
| pulmonary disease, unspecified  Mar 6, 2018 [facility]. [place]        
                                                                                            
                    |
| Cardiology    Heart Failure    Need for iv access    Type 2 diabetes mellitus with other  
                                                                                            
                    |
|  specified complication    Long term (current) use of insulin    Paroxysmal atrial        
 
                                                                                            
                    |
| fibrillation    Anemia in chronic kidney disease    Atherosclerotic heart disease of      
                                                                                            
                    |
| native coronary artery without angina pectoris    Cardiomyopathy, unspecified    End      
                                                                                            
                    |
| stage renal disease    Other specified postprocedural states  [facility]  
                                                                                            
                    |
|  Type Phone Fax Service Dates HEADINGS, SALEEM HENDERSONNAdryanP. Nurse Practitioner: Family           
                                                                                            
                    |
| Current  Marco Antonio Martinez /Care Coordinator [phone]  2019 -       
                                                                                            
                    |
| Current  [facility] [phone]  2019 - Current  [facility]        
                                                                                            
                    |
| [facility]  [phone] Current  [facility]  
                                                                                            
                    |
|  [facility] Primary Care   Current  [facility] ARROYOINDIRA Primary Care   Current  [facility],    
                                                                                            
                    |
| [facility] Mental Health Provider [phone] Current  [employer] Case or Care  
                                                                                            
                    |
|  Manager   Current  MIRTA Goel Case or Care Manager [phone]  
                                                                                            
                    |
|  [phone] Current  Jairo Gutierrez MD Other   Current  [employer]      
                                                                                            
                    |
| patient has registered at the [facility] Emergency Department For     
                                                                                            
                    |
| more information visit:                                                                   
                                                                                            
                    |
| [email or web address] The above    
                                                                                            
                    |
| information is provided for the sole purpose of patient treatment. Use of this            
                                                                                            
                    |
| information beyond the terms of Data Sharing Memorandum of Understanding and License      
                                                                                            
                    |
| Agreement is prohibited. In certain cases not all visits may be represented. Consult the  
                                                                                            
                    |
|  aforementioned facilities for additional information. 2019 [employer]            
                                                                                            
                    |
| [employer] [facility], [place] - [email or web address]                  
                                                                                            
                    |
|   End stage renal disease                                                                 
 
                                                                                            
                    |
|   Dependence on renal dialysis                                                            
                                                                                            
                    |
|   Long term (current) use of insulin                                                      
                                                                                            
                    |
|   Other chronic osteomyelitis, left ankle and foot                                        
                                                                                            
                    |
|   Type 2 diabetes mellitus with diabetic chronic kidney disease                           
                                                                                            
                    |
|   Essential (primary) hypertension                                                        
                                                                                            
                    |
|                                                                                           
                                                                                            
                    |
|2018 [facility]. [place] Inpatient                                      
                                                                                            
                    |
|  Upper GIB                                                                                
                                                                                            
                    |
|   Other chronic osteomyelitis, unspecified ankle and foot                                 
                                                                                            
                    |
|   End stage renal disease                                                                 
                                                                                            
                    |
|   Other specified personal risk factors, not elsewhere classified                         
                                                                                            
                    |
|   Chronic systolic (congestive) heart failure                                             
                                                                                            
                    |
|   Anemia in chronic kidney disease                                                        
                                                                                            
                    |
|   Other disorders of plasma-protein metabolism, not elsewhere classified                  
                                                                                            
                    |
|   Moderate protein-calorie malnutrition                                                   
                                                                                            
                    |
|   Other disorders of phosphorus metabolism                                                
                                                                                            
                    |
|                                                                                           
                                                                                            
                    |
|2018 [facility] [place] [facility]                                
                                                                                            
                    |
|  1. Type 2 diabetes mellitus with other specified complication                            
                                                                                            
                    |
|   2. Urinary tract infection, site not specified                                          
 
                                                                                            
                    |
|   3. Unspecified protein-calorie malnutrition                                             
                                                                                            
                    |
|   4. Other chronic osteomyelitis, unspecified site                                        
                                                                                            
                    |
|   5. 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
onic kidney disease, or end stage renal disease                                             
                    |
|   6. Chronic diastolic (congestive) heart failure                                         
                                                                                            
                    |
|   7. Other osteomyelitis, ankle and foot                                                  
                                                                                            
                    |
|   8. Type 2 diabetes mellitus with foot ulcer                                             
                                                                                            
                    |
|   9. Atherosclerotic heart disease of native coronary artery without angina pectoris      
                                                                                            
                    |
|   10. Chronic obstructive pulmonary disease, unspecified                                  
                                                                                            
                    |
|                                                                                           
                                                                                            
                    |
|2018 [facility] [place] [facility]                                 
                                                                                            
                    |
|  1. Benign paroxysmal vertigo, unspecified ear                                            
                                                                                            
                    |
|   2. End stage renal disease                                                              
                                                                                            
                    |
|   3. Hypertensive chronic kidney disease with stage 5 chronic kidney disease or end stage 
renal disease                                                                               
                    |
|   4. Urinary tract infection, site not specified                                          
                                                                                            
                    |
|   5. 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
onic kidney disease, or end stage renal disease                                             
                    |
|   6. Kidney transplant status                                                             
                                                                                            
                    |
|   7. Unspecified systolic (congestive) heart failure                                      
                                                                                            
                    |
|   8. Syncope and collapse                                                                 
                                                                                            
                    |
|   9. Type 2 diabetes mellitus with diabetic chronic kidney disease                        
                                                                                            
                    |
|   10. Atherosclerotic heart disease of native coronary artery without angina pectoris     
 
                                                                                            
                    |
|                                                                                           
                                                                                            
                    |
|Sep [facility] [place] [facility]                           
                                                                                            
                    |
|  Acute ischemic heart disease, unspecified                                                
                                                                                            
                    |
|   Long term (current) use of insulin                                                      
                                                                                            
                    |
|   Dependence on renal dialysis                                                            
                                                                                            
                    |
|   End stage renal disease                                                                 
                                                                                            
                    |
|   Type 2 diabetes mellitus with diabetic chronic kidney disease                           
                                                                                            
                    |
|   Essential (primary) hypertension                                                        
                                                                                            
                    |
|   Anemia in chronic kidney disease                                                        
                                                                                            
                    |
|                                                                                           
                                                                                            
                    |
|2018 [facility] [place] [facility]                                
                                                                                            
                    |
|  0. Cough                                                                                 
                                                                                            
                    |
|   1. Pneumonia due to Pseudomonas                                                         
                                                                                            
                    |
|   2. End stage renal disease                                                              
                                                                                            
                    |
|   3. 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
onic kidney disease, or end stage renal disease                                             
                    |
|   4. Cachexia                                                                             
                                                                                            
                    |
|   5. Chronic obstructive pulmonary disease with acute lower respiratory infection         
                                                                                            
                    |
|   6. Type 2 diabetes mellitus with diabetic chronic kidney disease                        
                                                                                            
                    |
|   7. Heart failure, unspecified                                                           
                                                                                            
                    |
|   8. Hyperlipidemia, unspecified                                                          
 
                                                                                            
                    |
|   9. Gastro-esophageal reflux disease without esophagitis                                 
                                                                                            
                    |
|                                                                                           
                                                                                            
                    |
|2018 [facility]. [place] [facility]                                 
                                                                                            
                    |
|  0. Other chest pain                                                                      
                                                                                            
                    |
|   1. Gastro-esophageal reflux disease without esophagitis                                 
                                                                                            
                    |
|   2. End stage renal disease                                                              
                                                                                            
                    |
|   3. 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
onic kidney disease, or end stage renal disease                                             
                    |
|   4. Chronic systolic (congestive) heart failure                                          
                                                                                            
                    |
|   5. 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
ctoris                                                                                      
                    |
|   6. Type 2 diabetes mellitus with diabetic chronic kidney disease                        
                                                                                            
                    |
|   7. Hyperlipidemia, unspecified                                                          
                                                                                            
                    |
|   8. Major depressive disorder, single episode, unspecified                               
                                                                                            
                    |
|   9. Chronic obstructive pulmonary disease, unspecified                                   
                                                                                            
                    |
|                                                                                           
                                                                                            
                    |
|Mar 6, 2018 [facility] JANEEN Ramsay. [place] [facility]                              
                                                                                            
                    |
|  Heart Failure                                                                            
                                                                                            
                    |
|   Need for iv access                                                                      
                                                                                            
                    |
|   Type 2 diabetes mellitus with other specified complication                              
                                                                                            
                    |
|   Long term (current) use of insulin                                                      
                                                                                            
                    |
|   Paroxysmal atrial fibrillation                                                          
 
                                                                                            
                    |
|   Anemia in chronic kidney disease                                                        
                                                                                            
                    |
|   Atherosclerotic heart disease of native coronary artery without angina pectoris         
                                                                                            
                    |
|   Cardiomyopathy, unspecified                                                             
                                                                                            
                    |
|   End stage renal disease                                                                 
                                                                                            
                    |
|   Other specified postprocedural states                                                   
                                                                                            
                    |
|                                                                                           
                                                                                            
                    |
|                                                                                           
                                                                                            
                    |
|                                                                                           
                                                                                            
                    |
|Care Providers                                                                             
                                                                                            
                    |
|Provider PRC Type Phone Fax Service Dates                                                  
                                                                                            
                    |
|HEADINGS, ELZA HENDERSON Nurse Practitioner: Family   Current                                
                                                                                            
                    |
|Marco Antonio Martinez /Care Coordinator [phone]  2019 - Current         
                                                                                            
                    |
|[facility] [phone]  2019 - Current                          
                                                                                            
                    |
|[facility]  [phone] Current                   
                                                                                            
                    |
|[facility] [facility]   Current                                
                                                                                            
                    |
|MANOL[facility] GONZALEZ Primary Care   Current                                                        
                                                                                            
                    |
|[employer] Mental Health Provider [phone] Current                 
                                                                                            
                    |
|[employer] Case or Care Manager   Current                                                   
                                                                                            
                    |
|MIRTA Goel Case or Care Manager [phone] Current
                                                                                            
                    |
|Jairo Gutierrez MD Other   Current                                                       
 
                                                                                            
                    |
|                                                                                           
                                                                                            
                    |
|[employer] [facility]                                                                          
                                                                                            
                    |
|This patient has registered at the [facility] Emergency Department     
                                                                                            
                    |
|For more information visit: [email or web address]
[email or web address]                                                                               
                    |
|The above information is provided for the sole purpose of patient treatment. Use of this in
formation beyond the terms of Data Sharing Memorandum of Understanding and License Agreement
 is prohibited. In  |
|certain cases not all visits may be represented. Consult the aforementioned facilities for 
additional information.                                                                     
                    |
|2019 [employer] - [place], [place] - [email or web address]                                                                                       
                    |
+-------------------------------------------------------------------------------------------
--------------------------------------------------------------------------------------------
--------------------+
 
 
 
+-------------------+---------+--------------------+--------------+
| Performing        | Address | City/State/Zipcode | Phone Number |
| Organization      |         |                    |              |
+-------------------+---------+--------------------+--------------+
|   ED INFORMATION  |         |                    |              |
| EXCHANGE          |         |                    |              |
+-------------------+---------+--------------------+--------------+
 Phosphorus (2019  5:38 AM)
 
+------------+--------------------------+-----------------+--------------+
| Component  | Value                    | Ref Range       | Performed At |
+------------+--------------------------+-----------------+--------------+
| PHOSPHORUS | 3.6Comment: Testing      | 2.3 - 4.8 mg/dL | [facility]   |
|            | performed at [facility], [address] |                 | LABORATORY   |
|            |  [place],        |                 |              |
|            | [place]  [address]     |                 |              |
+------------+--------------------------+-----------------+--------------+
 
 
 
+----------+
| Specimen |
+----------+
| Blood    |
+----------+
 
 
 
+---------------+-------------------------+---------------------+----------------+
| Performing    | Address                 | City/State/Zipcode  | Phone Number   |
| Organization  |                         |                     |                |
 
+---------------+-------------------------+---------------------+----------------+
|   [facility]  |   [address]  | [place] [address] |   [phone] |
| LABORATORY    | [address]                   |                     |                |
+---------------+-------------------------+---------------------+----------------+
 Magnesium (2019  5:38 AM)
 
+-----------+--------------------------+-----------------+--------------+
| Component | Value                    | Ref Range       | Performed At |
+-----------+--------------------------+-----------------+--------------+
| MAGNESIUM | 2.3Comment: Testing      | 1.7 - 2.4 mg/dL | [facility]   |
|           | performed at [facility], [address] |                 | LABORATORY   |
|           |  [address],        |                 |              |
|           | [place]  [address]     |                 |              |
+-----------+--------------------------+-----------------+--------------+
 
 
 
+----------+
| Specimen |
+----------+
| Blood    |
+----------+
 
 
 
+---------------+-------------------------+---------------------+----------------+
| Performing    | Address                 | City/State/Zipcode  | Phone Number   |
| Organization  |                         |                     |                |
+---------------+-------------------------+---------------------+----------------+
|   [facility]  |   [address]  | [place] [address] |   [phone] |
| LABORATORY    | [address]                   |                     |                |
+---------------+-------------------------+---------------------+----------------+
 Basic metabolic panel (2019  5:38 AM)Only the most recent of 2 results within the rebekah
e period is included.
 
+----------------+--------------------------+-------------------+--------------+
| Component      | Value                    | Ref Range         | Performed At |
+----------------+--------------------------+-------------------+--------------+
| SODIUM         | 139                      | 135 - 145 mmol/L  | [facility]   |
|                |                          |                   | LABORATORY   |
+----------------+--------------------------+-------------------+--------------+
| POTASSIUM      | 3.9                      | 3.5 - 4.9 mmol/L  | [facility]   |
|                |                          |                   | LABORATORY   |
+----------------+--------------------------+-------------------+--------------+
| CHLORIDE       | 97 (L)                   | 99 - 109 mmol/L   | [facility]   |
|                |                          |                   | LABORATORY   |
+----------------+--------------------------+-------------------+--------------+
| CO2            | 31                       | 23 - 32 mmol/L    | [facility]   |
|                |                          |                   | LABORATORY   |
+----------------+--------------------------+-------------------+--------------+
| ANION GAP AGAP | 15                       | 5 - 20 mmol/L     | [facility]   |
|                |                          |                   | LABORATORY   |
+----------------+--------------------------+-------------------+--------------+
| GLUCOSE        | 169 (H)                  | 65 - 99 mg/dL     | [facility]   |
|                |                          |                   | LABORATORY   |
+----------------+--------------------------+-------------------+--------------+
| BUN            | 13                       | 8 - 25 mg/dL      | [facility]   |
|                |                          |                   | LABORATORY   |
+----------------+--------------------------+-------------------+--------------+
| CREATININE     | 4.8 (H)                  | 0.50 - 1.00 mg/dL | [facility]   |
 
|                |                          |                   | LABORATORY   |
+----------------+--------------------------+-------------------+--------------+
| BUN/CREAT      | 3                        |                   | [facility]   |
|                |                          |                   | LABORATORY   |
+----------------+--------------------------+-------------------+--------------+
| CALCIUM        | 9.4                      | 8.5 - 10.5 mg/dL  | [facility]   |
|                |                          |                   | LABORATORY   |
+----------------+--------------------------+-------------------+--------------+
| EGFR           | 9 (L)Comment: GFR <60:   | >60 mL/min/1.73m2 | [facility]   |
|                | CHRONIC KIDNEY DISEASE,  |                   | LABORATORY   |
|                | IF FOUND OVER A 3 MONTH  |                   |              |
|                | PERIOD.GFR <15: KIDNEY   |                   |              |
|                | FAILURE.FOR       |                   |              |
|                | AMERIC[employer], MULTIPLY THE  |                   |              |
|                | CALCULATED GFR BY        |                   |              |
|                | 1.210.This eGFR is       |                   |              |
|                | calculated using the     |                   |              |
|                | MDRD IDMS traceable      |                   |              |
|                | equation.Testing         |                   |              |
|                | performed at [facility], [address] |                   |              |
|                |  [place] [address],        |                   |              |
|                | [place]    [address]   |                   |              |
+----------------+--------------------------+-------------------+--------------+
 
 
 
+----------+
| Specimen |
+----------+
| Blood    |
+----------+
 
 
 
+---------------+-------------------------+---------------------+----------------+
| Performing    | Address                 | City/State/Zipcode  | Phone Number   |
| Organization  |                         |                     |                |
+---------------+-------------------------+---------------------+----------------+
|   [facility]  |   [address]  | [place] [address] |   [phone] |
| LABORATORY    | [place]                   |                     |                |
+---------------+-------------------------+---------------------+----------------+
 EKG STANDARD 12 LEAD (2019  6:18 AM)Only the most recent of 3 results within the time
 period is included.
 
+-------------------+--------------------------+-----------+--------------+
| Component         | Value                    | Ref Range | Performed At |
+-------------------+--------------------------+-----------+--------------+
| Ventricular Rate  | 73                       | BPM       | [facility]     |
+-------------------+--------------------------+-----------+--------------+
| Atrial Rate       | 73                       | BPM       | [facility]     |
+-------------------+--------------------------+-----------+--------------+
| P-R Interval      | 146                      | ms        | [facility]     |
+-------------------+--------------------------+-----------+--------------+
| QRS Duration      | 128                      | ms        | [facility]     |
+-------------------+--------------------------+-----------+--------------+
| Q-T Interval      | 464                      | ms        | [facility]     |
+-------------------+--------------------------+-----------+--------------+
| QTC Calculation   | 511                      | ms        | [facility]     |
| (Bezet)           |                          |           |              |
+-------------------+--------------------------+-----------+--------------+
 
| Calculated P Axis | 70                       | degrees   | [facility]     |
+-------------------+--------------------------+-----------+--------------+
| Calculated R Axis | -38                      | degrees   | [facility]     |
+-------------------+--------------------------+-----------+--------------+
| Calculated T Axis | 9                        | degrees   | [facility]     |
+-------------------+--------------------------+-----------+--------------+
| Diagnosis         | Normal sinus             |           | [facility]     |
|                   | rhythmPossible Left      |           |              |
|                   | atrial enlargementLeft   |           |              |
|                   | axis                     |           |              |
|                   | deviationNon-specific    |           |              |
|                   | intra-ventricular        |           |              |
|                   | conduction blockCannot   |           |              |
|                   | rule out Septal infarct  |           |              |
|                   | , age                    |           |              |
|                   | undeterminedAbnormal     |           |              |
|                   | ECGWhen compared with    |           |              |
|                   | ECG of 2019       |           |              |
|                   | 05:50,Minimal criteria   |           |              |
|                   | for Septal infarct are   |           |              |
|                   | now PresentConfirmed by  |           |              |
|                   | EN FRANK ([id number]) on   |           |              |
|                   | 2019 12:03:10 PM    |           |              |
+-------------------+--------------------------+-----------+--------------+
 
 
 
+--------------+-------------------+--------------------+--------------+
| Performing   | Address           | City/State/Zipcode | Phone Number |
| Organization |                   |                    |              |
+--------------+-------------------+--------------------+--------------+
|   [facility]   |   [address] | [place] [address] |              |
+--------------+-------------------+--------------------+--------------+
 IR stent femoral popliteal (2019  6:45 PM)
 
+------------------------------------------------------------------------+--------------+
| Impressions                                                            | Performed At |
+------------------------------------------------------------------------+--------------+
|      1. Aorta and iliac arteries were calcified but without            |   [facility]     |
| significant stenosis.  2. Right SFA with high-grade stenosis           | RADIOLOGY    |
| proximally with good endograft results after angioplasty and stenting. |              |
|   3. 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
| artery to right foot.  4. Right posterior tibial artery was            |              |
| chronically occluded.     Electronically signed by Kirt Mclaughlin MD on    |              |
| 2019 3:50 PM                                                      |              |
+------------------------------------------------------------------------+--------------+
 
 
 
+------------------------------------------------------------------------+--------------+
| Narrative                                                              | Performed At |
+------------------------------------------------------------------------+--------------+
|   LOWER EXTREMITY ARTERIOGRAM, UNILATERAL     PREOPERATIVE             |   [facility]     |
| DIAGNOSIS:    Critical limb ischemia of right lower extremity with     | RADIOLOGY    |
| poorly healing wound of right great toe     POSTOPERATIVE              |              |
| DIAGNOSIS:    Same     PROCEDURE:  1. Moderate conscious sedation  2.  |              |
| Ultrasound guided access of the left common femoral artery  3.         |              |
| Aortogram  4. Selective right common femoral artery catheterization    |              |
| with right leg runoff  5. Right SFA stenting using 6 x 80 mm           |              |
| self-expanding stent  6 Drug eluting balloon angioplasty of right SFA  |              |
 
| using 4 x 100 mm Lutonix balloon     SURGEON: Kirt Mclaughlin MD              |              |
| ASSISTANT: None     ANESTHESIA: Moderate sedation and local anesthesia |              |
|      Informed consent was obtained from the patient. Continuous        |              |
| cardiac monitoring was performed throughout the procedure.  Conscious  |              |
| sedation was provided by the nursing staff during the procedure under  |              |
| my supervision.  Sedation time: 34 minutes  Medications: 2 mg Versed   |              |
| IV, 50 mcg Fentanyl IV     ESTIMATED BLOOD LOSS: Minimal               |              |
| CONTRAST:    53 mL of Isovue 250     INDICATIONS:    See preoperative  |              |
| history and physical     FINDINGS:    Aorta and iliac arteries         |              |
| calcified but without significant stenosis. Right SFA with high-grade  |              |
| stenosis proximally. After angioplasty and stenting, good angiographic |              |
|  results. Good two-vessel runoff to right foot via right anterior      |              |
| tibial artery and peroneal artery. Right posterior tibial artery was   |              |
| chronically occluded.     DESCRIPTION OF PROCEDURE:    The patient was |              |
|  properly identified and brought to the Cath Lab. The patient was      |              |
| placed supine on the catheter table and patient was given moderate     |              |
| sedation by nursing staff under my supervision. Patient's bilateral    |              |
| groins were then prepped and draped in the usual sterile fashion.      |              |
| Local anesthetic was given to the left groin and the left common       |              |
| femoral artery was accessed under ultrasound guidance using a          |              |
| micropuncture needle. A micropuncture sheath was inserted over a wire  |              |
| and up sized to a 4 French sheath. A Omni flush catheter was then      |              |
| inserted into the distal aorta and aortogram was then performed with   |              |
| the findings as described above. The Omni Flush catheter was then used |              |
|  to guide a Glidewire into the right common femoral artery and then    |              |
| the catheter was then advanced over the wire. A right lower extremity  |              |
| runoff was then performed with the findings as described above.        |              |
| Next, an Amplatzer wire was then inserted over the catheter and the 4  |              |
| French sheath was removed. A 6 French destination sheath was then      |              |
| inserted over the wire with the tip of the sheath being placed into    |              |
| the right common femoral artery. A vertebral catheter was inserted     |              |
| over the wire and the wire was then removed. A Glidewire was then      |              |
| placed into the vertebral catheter and used to cross through the       |              |
| stenotic right SFA.    Next, a 260 fix core wire was then inserted     |              |
| over the catheter.    Right SFA was stented using 6 x 80 mm            |              |
| self-expanding stent. Drug eluting balloon angioplasty of the right    |              |
| SFA was then performed using 4 x 100 mm Lutonix balloon.     A final   |              |
| arteriogram was then performed through the sheath. The destination     |              |
| sheath was then removed and a Mynx closure device was then used on the |              |
|  left common femoral artery and hemostasis was ensured. The patient    |              |
| was then taken to the observation unit for further monitoring.         |              |
+------------------------------------------------------------------------+--------------+
 
 
 
+-------------------------------------------------------------------------------------------
--------------------------------------------------------------------------------------------
-----------------------------------+
| Procedure Note                                                                            
                                                                                            
                                   |
+-------------------------------------------------------------------------------------------
--------------------------------------------------------------------------------------------
-----------------------------------+
|   Lauro Baldwin Results In - 2019  8:40 AM PST  LOWER EXTREMITY ARTERIOGRAM,             
                                                                                            
                                   |
| UNILATERALPREOPERATIVE DIAGNOSIS:  Critical limb ischemia of right lower extremity with   
                                                                                            
                                   |
 
| poorly healing wound of right great toePOSTOPERATIVE DIAGNOSIS:  SamePROCEDURE:1.         
                                                                                            
                                   |
| Moderate conscious sedation2. Ultrasound guided access of the left common femoral         
                                                                                            
                                   |
| artery3. Aortogram4. Selective right common femoral artery catheterization with right     
                                                                                            
                                   |
| leg runoff5. Right SFA stenting using 6 x 80 mm self-expanding stent6 Drug eluting        
                                                                                            
                                   |
| balloon angioplasty of right SFA using 4 x 100 mm Lutonix balloonSURGEON: Kirt Mclaughlin,        
                                                                                            
                                   |
| MDASSISTANT: NoneANESTHESIA: Moderate sedation and local anesthesiaInformed consent was   
                                                                                            
                                   |
| obtained from the patient. Continuous cardiac monitoring was performed throughout the     
                                                                                            
                                   |
| procedure.Conscious sedation was provided by the nursing staff during the procedure       
                                                                                            
                                   |
| under my supervision.Sedation time: 34 minutesMedications: 2 mg Versed IV, 50 mcg         
                                                                                            
                                   |
| Fentanyl IVESTIMATED BLOOD LOSS: MinimalCONTRAST:  53 mL of Isovue 250INDICATIONS:  See   
                                                                                            
                                   |
| preoperative history and physicalFINDINGS:  Aorta and iliac arteries calcified but        
                                                                                            
                                   |
| without significant stenosis. Right SFA with high-grade stenosis proximally. After        
                                                                                            
                                   |
| angioplasty and stenting, good angiographic results. Good two-vessel runoff to right      
                                                                                            
                                   |
| foot via right anterior tibial artery and peroneal artery. Right posterior tibial artery  
                                                                                            
                                   |
|  was chronically occluded.DESCRIPTION OF PROCEDURE:  The patient was properly identified  
                                                                                            
                                   |
|  and brought to the Cath Lab. The patient was placed supine on the catheter table and     
                                                                                            
                                   |
| patient was given moderate sedation by nursing staff under my supervision. Patient's      
                                                                                            
                                   |
| bilateral groins were then prepped and draped in the usual sterile fashion. Local         
                                                                                            
                                   |
| anesthetic was given to the left groin and the left common femoral artery was accessed    
                                                                                            
                                   |
| under ultrasound guidance using a micropuncture needle. A micropuncture sheath was        
                                                                                            
                                   |
 
| inserted over a wire and up sized to a 4 French sheath. A Omni flush catheter was then    
                                                                                            
                                   |
| inserted into the distal aorta and aortogram was then performed with the findings as      
                                                                                            
                                   |
| described above. The Omni Flush catheter was then used to guide a Glidewire into the      
                                                                                            
                                   |
| right common femoral artery and then the catheter was then advanced over the wire. A      
                                                                                            
                                   |
| right lower extremity runoff was then performed with the findings as described            
                                                                                            
                                   |
| above.Next, an Amplatzer wire was then inserted over the catheter and the 4 French        
                                                                                            
                                   |
| sheath was removed. A 6 French destination sheath was then inserted over the wire with    
                                                                                            
                                   |
| the tip of the sheath being placed into the right common femoral artery. A vertebral      
                                                                                            
                                   |
| catheter was inserted over the wire and the wire was then removed. A Glidewire was then   
                                                                                            
                                   |
| placed into the vertebral catheter and used to cross through the stenotic right SFA.      
                                                                                            
                                   |
| Next, a 260 fix core wire was then inserted over the catheter.  Right SFA was stented     
                                                                                            
                                   |
| using 6 x 80 mm self-expanding stent. Drug eluting balloon angioplasty of the right SFA   
                                                                                            
                                   |
| was then performed using 4 x 100 mm Lutonix balloon.A final arteriogram was then          
                                                                                            
                                   |
| performed through the sheath. The destination sheath was then removed and a Mynx closure  
                                                                                            
                                   |
|  device was then used on the left common femoral artery and hemostasis was ensured. The   
                                                                                            
                                   |
| patient was then taken to the observation unit for further monitoring.IMPRESSION:1.       
                                                                                            
                                   |
| Aorta and iliac arteries were calcified but without significant stenosis.2. Right SFA     
                                                                                            
                                   |
| with high-grade stenosis proximally with good endograft results after angioplasty and     
                                                                                            
                                   |
| stenting.3. Two-vessel runoff via right anterior tibial artery and peroneal artery to     
                                                                                            
                                   |
| right foot.4. 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       
                                                                                            
                                   |
 
| signed by Kirt Mclaughlin MD on 2019 3:50 PM                                              
                                                                                            
                                   |
|                                                                                           
                                                                                            
                                   |
|A final arteriogram was then performed through the sheath. The destination sheath was then 
removed and a Mynx closure device was then used on the left common femoral artery and hemost
asis was ensured. The patient was  |
|then taken to the observation unit for further monitoring.                                 
                                                                                            
                                   |
|                                                                                           
                                                                                            
                                   |
|IMPRESSION:                                                                                
                                                                                            
                                  |
|                                                                                           
                                                                                            
                                   |
|1. Aorta and iliac arteries were calcified but without significant stenosis.               
                                                                                            
                                   |
|2. Right SFA with high-grade stenosis proximally with good endograft results after angiopla
sty and stenting.                                                                           
                                   |
|3. Two-vessel runoff via right anterior tibial artery and peroneal artery to right foot.   
                                                                                            
                                   |
|4. 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                                 
                                                                                            
                                   |
|                                                                                           
                                                                                            
                                   |
|Electronically signed by Kirt Mclaughlin MD on 2019 3:50 PM                                
                                                                                            
                                   |
+-------------------------------------------------------------------------------------------
--------------------------------------------------------------------------------------------
-----------------------------------+
 
 
 
+--------------------+------------------+--------------------+--------------+
| Performing         | Address          | City/State/Zipcode | Phone Number |
| Organization       |                  |                    |              |
+--------------------+------------------+--------------------+--------------+
|   [facility] |   [address] | [place] [address] |              |
+--------------------+------------------+--------------------+--------------+
 IR angiogram extremity right (2019  6:45 PM)
 
+------------------------------------------------------------------------+--------------+
| Impressions                                                            | Performed At |
+------------------------------------------------------------------------+--------------+
|      1. Aorta and iliac arteries were calcified but without            |   [facility]     |
| significant stenosis.  2. Right SFA with high-grade stenosis           | RADIOLOGY    |
| proximally with good endograft results after angioplasty and stenting. |              |
|   3. 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
 
| artery to right foot.  4. Right posterior tibial artery was            |              |
| chronically occluded.     Electronically signed by Kirt Mclaughlin MD on    |              |
| 2019 3:50 PM                                                      |              |
+------------------------------------------------------------------------+--------------+
 
 
 
+------------------------------------------------------------------------+--------------+
| Narrative                                                              | Performed At |
+------------------------------------------------------------------------+--------------+
|   LOWER EXTREMITY ARTERIOGRAM, UNILATERAL     PREOPERATIVE             |   [facility]     |
| DIAGNOSIS:    Critical limb ischemia of right lower extremity with     | RADIOLOGY    |
| poorly healing wound of right great toe     POSTOPERATIVE              |              |
| DIAGNOSIS:    Same     PROCEDURE:  1. Moderate conscious sedation  2.  |              |
| Ultrasound guided access of the left common femoral artery  3.         |              |
| Aortogram  4. Selective right common femoral artery catheterization    |              |
| with right leg runoff  5. Right SFA stenting using 6 x 80 mm           |              |
| self-expanding stent  6 Drug eluting balloon angioplasty of right SFA  |              |
| using 4 x 100 mm Lutonix balloon     SURGEON: Kirt Mclaughlin MD              |              |
| ASSISTANT: None     ANESTHESIA: Moderate sedation and local anesthesia |              |
|      Informed consent was obtained from the patient. Continuous        |              |
| cardiac monitoring was performed throughout the procedure.  Conscious  |              |
| sedation was provided by the nursing staff during the procedure under  |              |
| my supervision.  Sedation time: 34 minutes  Medications: 2 mg Versed   |              |
| IV, 50 mcg Fentanyl IV     ESTIMATED BLOOD LOSS: Minimal               |              |
| CONTRAST:    53 mL of Isovue 250     INDICATIONS:    See preoperative  |              |
| history and physical     FINDINGS:    Aorta and iliac arteries         |              |
| calcified but without significant stenosis. Right SFA with high-grade  |              |
| stenosis proximally. After angioplasty and stenting, good angiographic |              |
|  results. Good two-vessel runoff to right foot via right anterior      |              |
| tibial artery and peroneal artery. Right posterior tibial artery was   |              |
| chronically occluded.     DESCRIPTION OF PROCEDURE:    The patient was |              |
|  properly identified and brought to the Cath Lab. The patient was      |              |
| placed supine on the catheter table and patient was given moderate     |              |
| sedation by nursing staff under my supervision. Patient's bilateral    |              |
| groins were then prepped and draped in the usual sterile fashion.      |              |
| Local anesthetic was given to the left groin and the left common       |              |
| femoral artery was accessed under ultrasound guidance using a          |              |
| micropuncture needle. A micropuncture sheath was inserted over a wire  |              |
| and up sized to a 4 French sheath. A Omni flush catheter was then      |              |
| inserted into the distal aorta and aortogram was then performed with   |              |
| the findings as described above. The Omni Flush catheter was then used |              |
|  to guide a Glidewire into the right common femoral artery and then    |              |
| the catheter was then advanced over the wire. A right lower extremity  |              |
| runoff was then performed with the findings as described above.        |              |
| Next, an Amplatzer wire was then inserted over the catheter and the 4  |              |
| French sheath was removed. A 6 French destination sheath was then      |              |
| inserted over the wire with the tip of the sheath being placed into    |              |
| the right common femoral artery. A vertebral catheter was inserted     |              |
| over the wire and the wire was then removed. A Glidewire was then      |              |
| placed into the vertebral catheter and used to cross through the       |              |
| stenotic right SFA.    Next, a 260 fix core wire was then inserted     |              |
| over the catheter.    Right SFA was stented using 6 x 80 mm            |              |
| self-expanding stent. Drug eluting balloon angioplasty of the right    |              |
| SFA was then performed using 4 x 100 mm Lutonix balloon.     A final   |              |
| arteriogram was then performed through the sheath. The destination     |              |
| sheath was then removed and a Mynx closure device was then used on the |              |
|  left common femoral artery and hemostasis was ensured. The patient    |              |
| was then taken to the observation unit for further monitoring.         |              |
+------------------------------------------------------------------------+--------------+
 
 
 
 
+-------------------------------------------------------------------------------------------
--------------------------------------------------------------------------------------------
-----------------------------------+
| Procedure Note                                                                            
                                                                                            
                                   |
+-------------------------------------------------------------------------------------------
--------------------------------------------------------------------------------------------
-----------------------------------+
|   Denny, Rad Results In - 2019  8:40 AM PST  LOWER EXTREMITY ARTERIOGRAM,             
                                                                                            
                                   |
| UNILATERALPREOPERATIVE DIAGNOSIS:  Critical limb ischemia of right lower extremity with   
                                                                                            
                                   |
| poorly healing wound of right great toePOSTOPERATIVE DIAGNOSIS:  SamePROCEDURE:1.         
                                                                                            
                                   |
| Moderate conscious sedation2. Ultrasound guided access of the left common femoral         
                                                                                            
                                   |
| artery3. Aortogram4. Selective right common femoral artery catheterization with right     
                                                                                            
                                   |
| leg runoff5. Right SFA stenting using 6 x 80 mm self-expanding stent6 Drug eluting        
                                                                                            
                                   |
| balloon angioplasty of right SFA using 4 x 100 mm Lutonix balloonSURGEON: Kirt Mclaughlin,        
                                                                                            
                                   |
| MDASSISTANT: NoneANESTHESIA: Moderate sedation and local anesthesiaInformed consent was   
                                                                                            
                                   |
| obtained from the patient. Continuous cardiac monitoring was performed throughout the     
                                                                                            
                                   |
| procedure.Conscious sedation was provided by the nursing staff during the procedure       
                                                                                            
                                   |
| under my supervision.Sedation time: 34 minutesMedications: 2 mg Versed IV, 50 mcg         
                                                                                            
                                   |
| Fentanyl IVESTIMATED BLOOD LOSS: MinimalCONTRAST:  53 mL of Isovue 250INDICATIONS:  See   
                                                                                            
                                   |
| preoperative history and physicalFINDINGS:  Aorta and iliac arteries calcified but        
                                                                                            
                                   |
| without significant stenosis. Right SFA with high-grade stenosis proximally. After        
                                                                                            
                                   |
| angioplasty and stenting, good angiographic results. Good two-vessel runoff to right      
                                                                                            
                                   |
| foot via right anterior tibial artery and peroneal artery. Right posterior tibial artery  
                                                                                            
                                   |
 
|  was chronically occluded.DESCRIPTION OF PROCEDURE:  The patient was properly identified  
                                                                                            
                                   |
|  and brought to the Cath Lab. The patient was placed supine on the catheter table and     
                                                                                            
                                   |
| patient was given moderate sedation by nursing staff under my supervision. Patient's      
                                                                                            
                                   |
| bilateral groins were then prepped and draped in the usual sterile fashion. Local         
                                                                                            
                                   |
| anesthetic was given to the left groin and the left common femoral artery was accessed    
                                                                                            
                                   |
| under ultrasound guidance using a micropuncture needle. A micropuncture sheath was        
                                                                                            
                                   |
| inserted over a wire and up sized to a 4 French sheath. A Omni flush catheter was then    
                                                                                            
                                   |
| inserted into the distal aorta and aortogram was then performed with the findings as      
                                                                                            
                                   |
| described above. The Omni Flush catheter was then used to guide a Glidewire into the      
                                                                                            
                                   |
| right common femoral artery and then the catheter was then advanced over the wire. A      
                                                                                            
                                   |
| right lower extremity runoff was then performed with the findings as described            
                                                                                            
                                   |
| above.Next, an Amplatzer wire was then inserted over the catheter and the 4 French        
                                                                                            
                                   |
| sheath was removed. A 6 French destination sheath was then inserted over the wire with    
                                                                                            
                                   |
| the tip of the sheath being placed into the right common femoral artery. A vertebral      
                                                                                            
                                   |
| catheter was inserted over the wire and the wire was then removed. A Glidewire was then   
                                                                                            
                                   |
| placed into the vertebral catheter and used to cross through the stenotic right SFA.      
                                                                                            
                                   |
| Next, a 260 fix core wire was then inserted over the catheter.  Right SFA was stented     
                                                                                            
                                   |
| using 6 x 80 mm self-expanding stent. Drug eluting balloon angioplasty of the right SFA   
                                                                                            
                                   |
| was then performed using 4 x 100 mm Lutonix balloon.A final arteriogram was then          
                                                                                            
                                   |
| performed through the sheath. The destination sheath was then removed and a Mynx closure  
                                                                                            
                                   |
 
|  device was then used on the left common femoral artery and hemostasis was ensured. The   
                                                                                            
                                   |
| patient was then taken to the observation unit for further monitoring.IMPRESSION:1.       
                                                                                            
                                   |
| Aorta and iliac arteries were calcified but without significant stenosis.2. Right SFA     
                                                                                            
                                   |
| with high-grade stenosis proximally with good endograft results after angioplasty and     
                                                                                            
                                   |
| stenting.3. Two-vessel runoff via right anterior tibial artery and peroneal artery to     
                                                                                            
                                   |
| right foot.4. 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       
                                                                                            
                                   |
| signed by Kirt Mclaughlin MD on 2019 3:50 PM                                              
                                                                                            
                                   |
|                                                                                           
                                                                                            
                                   |
|A final arteriogram was then performed through the sheath. The destination sheath was then 
removed and a Mynx closure device was then used on the left common femoral artery and hemost
asis was ensured. The patient was  |
|then taken to the observation unit for further monitoring.                                 
                                                                                            
                                   |
|                                                                                           
                                                                                            
                                   |
|IMPRESSION:                                                                                
                                                                                            
                                  |
|                                                                                           
                                                                                            
                                   |
|1. Aorta and iliac arteries were calcified but without significant stenosis.               
                                                                                            
                                   |
|2. Right SFA with high-grade stenosis proximally with good endograft results after angiopla
sty and stenting.                                                                           
                                   |
|3. Two-vessel runoff via right anterior tibial artery and peroneal artery to right foot.   
                                                                                            
                                   |
|4. 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                                 
                                                                                            
                                   |
|                                                                                           
                                                                                            
                                   |
|Electronically signed by Kirt Mclaughlin MD on 2019 3:50 PM                                
                                                                                            
                                   |
+-------------------------------------------------------------------------------------------
--------------------------------------------------------------------------------------------
-----------------------------------+
 
 
 
 
+--------------------+------------------+--------------------+--------------+
| Performing         | Address          | City/State/Zipcode | Phone Number |
| Organization       |                  |                    |              |
+--------------------+------------------+--------------------+--------------+
|   [facility] |   [address] | [place] [address] |              |
+--------------------+------------------+--------------------+--------------+
 IR aortagram abdominal (2019  6:45 PM)
 
+------------------------------------------------------------------------+--------------+
| Impressions                                                            | Performed At |
+------------------------------------------------------------------------+--------------+
|      1. Aorta and iliac arteries were calcified but without            |   [facility]     |
| significant stenosis.  2. Right SFA with high-grade stenosis           | RADIOLOGY    |
| proximally with good endograft results after angioplasty and stenting. |              |
|   3. 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
| artery to right foot.  4. Right posterior tibial artery was            |              |
| chronically occluded.     Electronically signed by Kirt Mclaughlin MD on    |              |
| 2019 3:50 PM                                                      |              |
+------------------------------------------------------------------------+--------------+
 
 
 
+------------------------------------------------------------------------+--------------+
| Narrative                                                              | Performed At |
+------------------------------------------------------------------------+--------------+
|   LOWER EXTREMITY ARTERIOGRAM, UNILATERAL     PREOPERATIVE             |   [facility]     |
| DIAGNOSIS:    Critical limb ischemia of right lower extremity with     | RADIOLOGY    |
| poorly healing wound of right great toe     POSTOPERATIVE              |              |
| DIAGNOSIS:    Same     PROCEDURE:  1. Moderate conscious sedation  2.  |              |
| Ultrasound guided access of the left common femoral artery  3.         |              |
| Aortogram  4. Selective right common femoral artery catheterization    |              |
| with right leg runoff  5. Right SFA stenting using 6 x 80 mm           |              |
| self-expanding stent  6 Drug eluting balloon angioplasty of right SFA  |              |
| using 4 x 100 mm Lutonix balloon     SURGEON: Kirt Mclaughlin MD              |              |
| ASSISTANT: None     ANESTHESIA: Moderate sedation and local anesthesia |              |
|      Informed consent was obtained from the patient. Continuous        |              |
| cardiac monitoring was performed throughout the procedure.  Conscious  |              |
| sedation was provided by the nursing staff during the procedure under  |              |
| my supervision.  Sedation time: 34 minutes  Medications: 2 mg Versed   |              |
| IV, 50 mcg Fentanyl IV     ESTIMATED BLOOD LOSS: Minimal               |              |
| CONTRAST:    53 mL of Isovue 250     INDICATIONS:    See preoperative  |              |
| history and physical     FINDINGS:    Aorta and iliac arteries         |              |
| calcified but without significant stenosis. Right SFA with high-grade  |              |
| stenosis proximally. After angioplasty and stenting, good angiographic |              |
|  results. Good two-vessel runoff to right foot via right anterior      |              |
| tibial artery and peroneal artery. Right posterior tibial artery was   |              |
| chronically occluded.     DESCRIPTION OF PROCEDURE:    The patient was |              |
|  properly identified and brought to the Cath Lab. The patient was      |              |
| placed supine on the catheter table and patient was given moderate     |              |
| sedation by nursing staff under my supervision. Patient's bilateral    |              |
| groins were then prepped and draped in the usual sterile fashion.      |              |
| Local anesthetic was given to the left groin and the left common       |              |
| femoral artery was accessed under ultrasound guidance using a          |              |
| micropuncture needle. A micropuncture sheath was inserted over a wire  |              |
| and up sized to a 4 French sheath. A Omni flush catheter was then      |              |
| inserted into the distal aorta and aortogram was then performed with   |              |
| the findings as described above. The Omni Flush catheter was then used |              |
 
|  to guide a Glidewire into the right common femoral artery and then    |              |
| the catheter was then advanced over the wire. A right lower extremity  |              |
| runoff was then performed with the findings as described above.        |              |
| Next, an Amplatzer wire was then inserted over the catheter and the 4  |              |
| French sheath was removed. A 6 French destination sheath was then      |              |
| inserted over the wire with the tip of the sheath being placed into    |              |
| the right common femoral artery. A vertebral catheter was inserted     |              |
| over the wire and the wire was then removed. A Glidewire was then      |              |
| placed into the vertebral catheter and used to cross through the       |              |
| stenotic right SFA.    Next, a 260 fix core wire was then inserted     |              |
| over the catheter.    Right SFA was stented using 6 x 80 mm            |              |
| self-expanding stent. Drug eluting balloon angioplasty of the right    |              |
| SFA was then performed using 4 x 100 mm Lutonix balloon.     A final   |              |
| arteriogram was then performed through the sheath. The destination     |              |
| sheath was then removed and a Mynx closure device was then used on the |              |
|  left common femoral artery and hemostasis was ensured. The patient    |              |
| was then taken to the observation unit for further monitoring.         |              |
+------------------------------------------------------------------------+--------------+
 
 
 
+-------------------------------------------------------------------------------------------
--------------------------------------------------------------------------------------------
-----------------------------------+
| Procedure Note                                                                            
                                                                                            
                                   |
+-------------------------------------------------------------------------------------------
--------------------------------------------------------------------------------------------
-----------------------------------+
|   Denny, Rad Results In - 2019  8:40 AM PST  LOWER EXTREMITY ARTERIOGRAM,             
                                                                                            
                                   |
| UNILATERALPREOPERATIVE DIAGNOSIS:  Critical limb ischemia of right lower extremity with   
                                                                                            
                                   |
| poorly healing wound of right great toePOSTOPERATIVE DIAGNOSIS:  SamePROCEDURE:1.         
                                                                                            
                                   |
| Moderate conscious sedation2. Ultrasound guided access of the left common femoral         
                                                                                            
                                   |
| artery3. Aortogram4. Selective right common femoral artery catheterization with right     
                                                                                            
                                   |
| leg runoff5. Right SFA stenting using 6 x 80 mm self-expanding stent6 Drug eluting        
                                                                                            
                                   |
| balloon angioplasty of right SFA using 4 x 100 mm Lutonix balloonSURGEON: Kirt Mclaughlin,        
                                                                                            
                                   |
| MDASSISTANT: NoneANESTHESIA: Moderate sedation and local anesthesiaInformed consent was   
                                                                                            
                                   |
| obtained from the patient. Continuous cardiac monitoring was performed throughout the     
                                                                                            
                                   |
| procedure.Conscious sedation was provided by the nursing staff during the procedure       
                                                                                            
                                   |
 
| under my supervision.Sedation time: 34 minutesMedications: 2 mg Versed IV, 50 mcg         
                                                                                            
                                   |
| Fentanyl IVESTIMATED BLOOD LOSS: MinimalCONTRAST:  53 mL of Isovue 250INDICATIONS:  See   
                                                                                            
                                   |
| preoperative history and physicalFINDINGS:  Aorta and iliac arteries calcified but        
                                                                                            
                                   |
| without significant stenosis. Right SFA with high-grade stenosis proximally. After        
                                                                                            
                                   |
| angioplasty and stenting, good angiographic results. Good two-vessel runoff to right      
                                                                                            
                                   |
| foot via right anterior tibial artery and peroneal artery. Right posterior tibial artery  
                                                                                            
                                   |
|  was chronically occluded.DESCRIPTION OF PROCEDURE:  The patient was properly identified  
                                                                                            
                                   |
|  and brought to the Cath Lab. The patient was placed supine on the catheter table and     
                                                                                            
                                   |
| patient was given moderate sedation by nursing staff under my supervision. Patient's      
                                                                                            
                                   |
| bilateral groins were then prepped and draped in the usual sterile fashion. Local         
                                                                                            
                                   |
| anesthetic was given to the left groin and the left common femoral artery was accessed    
                                                                                            
                                   |
| under ultrasound guidance using a micropuncture needle. A micropuncture sheath was        
                                                                                            
                                   |
| inserted over a wire and up sized to a 4 French sheath. A Omni flush catheter was then    
                                                                                            
                                   |
| inserted into the distal aorta and aortogram was then performed with the findings as      
                                                                                            
                                   |
| described above. The Omni Flush catheter was then used to guide a Glidewire into the      
                                                                                            
                                   |
| right common femoral artery and then the catheter was then advanced over the wire. A      
                                                                                            
                                   |
| right lower extremity runoff was then performed with the findings as described            
                                                                                            
                                   |
| above.Next, an Amplatzer wire was then inserted over the catheter and the 4 French        
                                                                                            
                                   |
| sheath was removed. A 6 French destination sheath was then inserted over the wire with    
                                                                                            
                                   |
| the tip of the sheath being placed into the right common femoral artery. A vertebral      
                                                                                            
                                   |
 
| catheter was inserted over the wire and the wire was then removed. A Glidewire was then   
                                                                                            
                                   |
| placed into the vertebral catheter and used to cross through the stenotic right SFA.      
                                                                                            
                                   |
| Next, a 260 fix core wire was then inserted over the catheter.  Right SFA was stented     
                                                                                            
                                   |
| using 6 x 80 mm self-expanding stent. Drug eluting balloon angioplasty of the right SFA   
                                                                                            
                                   |
| was then performed using 4 x 100 mm Lutonix balloon.A final arteriogram was then          
                                                                                            
                                   |
| performed through the sheath. The destination sheath was then removed and a Mynx closure  
                                                                                            
                                   |
|  device was then used on the left common femoral artery and hemostasis was ensured. The   
                                                                                            
                                   |
| patient was then taken to the observation unit for further monitoring.IMPRESSION:1.       
                                                                                            
                                   |
| Aorta and iliac arteries were calcified but without significant stenosis.2. Right SFA     
                                                                                            
                                   |
| with high-grade stenosis proximally with good endograft results after angioplasty and     
                                                                                            
                                   |
| stenting.3. Two-vessel runoff via right anterior tibial artery and peroneal artery to     
                                                                                            
                                   |
| right foot.4. 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       
                                                                                            
                                   |
| signed by Kirt Mclaughlin MD on 2019 3:50 PM                                              
                                                                                            
                                   |
|                                                                                           
                                                                                            
                                   |
|A final arteriogram was then performed through the sheath. The destination sheath was then 
removed and a Mynx closure device was then used on the left common femoral artery and hemost
asis was ensured. The patient was  |
|then taken to the observation unit for further monitoring.                                 
                                                                                            
                                   |
|                                                                                           
                                                                                            
                                   |
|IMPRESSION:                                                                                
                                                                                            
                                  |
|                                                                                           
                                                                                            
                                   |
|1. Aorta and iliac arteries were calcified but without significant stenosis.               
                                                                                            
                                   |
 
|2. Right SFA with high-grade stenosis proximally with good endograft results after angiopla
sty and stenting.                                                                           
                                   |
|3. Two-vessel runoff via right anterior tibial artery and peroneal artery to right foot.   
                                                                                            
                                   |
|4. 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                                 
                                                                                            
                                   |
|                                                                                           
                                                                                            
                                   |
|Electronically signed by Kirt Mclaughlin MD on 2019 3:50 PM                                
                                                                                            
                                   |
+-------------------------------------------------------------------------------------------
--------------------------------------------------------------------------------------------
-----------------------------------+
 
 
 
+--------------------+------------------+--------------------+--------------+
| Performing         | Address          | City/State/Zipcode | Phone Number |
| Organization       |                  |                    |              |
+--------------------+------------------+--------------------+--------------+
|   [facility] |   [address] | [place] [address] |              |
+--------------------+------------------+--------------------+--------------+
 IR guidance vascular access US (2019  5:40 PM)
 
+------------------------------------------------------------------------+--------------+
| Narrative                                                              | Performed At |
+------------------------------------------------------------------------+--------------+
|   This Point of Care (POC) ultrasound image has been reviewed and      |   [facility]     |
| interpreted by the physician identified as the performing physician in | RADIOLOGY    |
|  the   associated interpretation and report.                           |              |
+------------------------------------------------------------------------+--------------+
 
 
 
+--------------------+------------------+--------------------+--------------+
| Performing         | Address          | City/State/Zipcode | Phone Number |
| Organization       |                  |                    |              |
+--------------------+------------------+--------------------+--------------+
|   [facility] |   [address] | [place] [address] |              |
+--------------------+------------------+--------------------+--------------+
 CBC w/manual diff (2019  3:43 PM)Only the most recent of 2 results within the time danny marquez is included.
 
+----------------------+-------------------------+-------------------+-----------------+
| Component            | Value                   | Ref Range         | Performed At    |
+----------------------+-------------------------+-------------------+-----------------+
| WBC                  | 5.53Comment:            | 3.80 - 11.00 K/uL | [facility] |
+----------------------+-------------------------+-------------------+-----------------+
| RBC                  | 2.76 (L)                | 3.70 - 5.10 M/uL  | [facility] |
+----------------------+-------------------------+-------------------+-----------------+
| HGB                  | 9.4 (L)                 | 11.3 - 15.5 g/dL  | [facility] |
+----------------------+-------------------------+-------------------+-----------------+
| HCT                  | 28.4 (L)                | 34.0 - 46.0 %     | [facility] |
+----------------------+-------------------------+-------------------+-----------------+
| MCV                  | 102.9 (H)               | 80.0 - 100.0 fl   | [facility] |
 
+----------------------+-------------------------+-------------------+-----------------+
| MCH                  | 34.1 (H)                | 27.0 - 34.0 pg    | [facility] |
+----------------------+-------------------------+-------------------+-----------------+
| MCHC                 | 33.2                    | 32.0 - 35.5 g/dL  | [facility] |
+----------------------+-------------------------+-------------------+-----------------+
| RDW SD               | 61.3 (H)                | 37 - 53 fl        | [facility] |
+----------------------+-------------------------+-------------------+-----------------+
| PLT                  | 147 (L)Comment:         | 150 - 400 K/uL    | [facility] |
+----------------------+-------------------------+-------------------+-----------------+
| MPV                  | 9.3Comment:             | fl                | [facility] |
+----------------------+-------------------------+-------------------+-----------------+
| DIFF TYPE            | MANUAL                  |                   | [facility] |
+----------------------+-------------------------+-------------------+-----------------+
| Neutrophils Manual   | 64                      | %                 | [facility] |
+----------------------+-------------------------+-------------------+-----------------+
| Lymphocytes Manual   | 28                      | %                 | [facility] |
+----------------------+-------------------------+-------------------+-----------------+
| Monocytes Manual     | 8                       | %                 | [facility] |
+----------------------+-------------------------+-------------------+-----------------+
| Neutrophils Absolute | 3.54                    | 1.90 - 7.40 K/uL  | [facility] |
+----------------------+-------------------------+-------------------+-----------------+
| Lymphocytes Absolute | 1.55                    | 1.00 - 3.90 K/uL  | [facility] |
+----------------------+-------------------------+-------------------+-----------------+
| Monocytes Absolute   | 0.44                    | 0.00 - 0.80 K/uL  | [facility] |
+----------------------+-------------------------+-------------------+-----------------+
| MORPHOLOGY           | 1+Comment: ANISONORMAL  |                   | [facility] |
|                      | PLT MORPHTesting        |                   |                 |
|                      | performed at [facility]    |                   |                 |
|                      | [address] [facility];[place], [place]  |                   |                 |
|                      | [id number]                   |                   |                 |
|                      |                         |                   |                 |
+----------------------+-------------------------+-------------------+-----------------+
 
 
 
+-------------------+
| Specimen          |
+-------------------+
| Blood - Arm, Left |
+-------------------+
 
 
 
+-------------------+------------------+--------------------+--------------+
| Performing        | Address          | City/State/Zipcode | Phone Number |
| Organization      |                  |                    |              |
+-------------------+------------------+--------------------+--------------+
|   [facility] |   [address] | [place] [address] |              |
+-------------------+------------------+--------------------+--------------+
 CPK (2019  3:43 PM)
 
+-----------+-------------------------+--------------+-----------------+
| Component | Value                   | Ref Range    | Performed At    |
+-----------+-------------------------+--------------+-----------------+
| CPK       | 28 (L)Comment: Testing  | 30 - 240 U/L | [facility] |
|           | performed at [facility];[address]    |              |                 |
|           | [address];[place]  |              |                 |
|           | [id number]                   |              |                 |
+-----------+-------------------------+--------------+-----------------+
 
 
 
 
+-------------------+
| Specimen          |
+-------------------+
| Blood - Arm, Left |
+-------------------+
 
 
 
+-------------------+------------------+--------------------+--------------+
| Performing        | Address          | City/State/Zipcode | Phone Number |
| Organization      |                  |                    |              |
+-------------------+------------------+--------------------+--------------+
|   [facility] |   [address] | [place] [address] |              |
+-------------------+------------------+--------------------+--------------+
 US lower extremty  arterial right (2019  2:23 PM)
 
+------------------------------------------------------------------------+--------------+
| Impressions                                                            | Performed At |
+------------------------------------------------------------------------+--------------+
|   1. Right leg shows biphasic inflow with a greater than 50% stenosis  |   [facility]     |
| at  the origin of the superficial femoral artery                       | RADIOLOGY    |
| (137-309).    Biphasic  outflow.  2. Two vessel runoff to the right    |              |
| foot.  Signed by: Eugenio Hoffman Date/Time: 2019 3:11 PM  |              |
+------------------------------------------------------------------------+--------------+
 
 
 
+------------------------------------------------------------------------+--------------+
| Narrative                                                              | Performed At |
+------------------------------------------------------------------------+--------------+
|   LOWER EXTREMITY ARTERIAL EXAM WITH IMAGING  CLINICAL INFORMATION:    |   [facility]     |
| The patient is a 68-year-old female presenting to the vascular lab     | RADIOLOGY    |
| with  complaints of right foot nonhealing wound.    We have been asked |              |
|  to  evaluate for peripheral arterial disease.  COMPARISON:  None      |              |
| PROCEDURE:  Imaging of the right lower extremity arteries was          |              |
| performed using both  color Doppler and spectral Doppler techniques    |              |
| with a 9 megahertz linear  array transducer.  FINDINGS:  RIGHT  CFA    |              |
| prox 137 biphasic  DFA prox 71 biphasic  SFA prox 309 biphasic  SFA    |              |
| mid 91 biphasic  SFA distal 127 biphasic  POP mid 96 biphasic  GIL     |              |
| prox 69 biphasic  GIL distal 145 biphasic  PTA prox 50 biphasic  PTA   |              |
| distal 58 biphasic  WINIFRED prox 74 biphasic  WINIFRED distal 0    absent     |              |
+------------------------------------------------------------------------+--------------+
 
 
 
+------------------------------------------------------------------------------------------+
| Procedure Note                                                                           |
+------------------------------------------------------------------------------------------+
|   Denny, Rad Results In - 2019  3:14 PM PST  LOWER EXTREMITY ARTERIAL EXAM WITH      |
| IMAGINGCLINICAL INFORMATION:The patient is a 68-year-old female presenting to the        |
| vascular lab withcomplaints of right foot nonhealing wound.  We have been asked          |
| toevaluate for peripheral arterial disease.COMPARISON:NonePROCEDURE:Imaging of the right |
|  lower extremity arteries was performed using bothcolor Doppler and spectral Doppler     |
| techniques with a 9 megahertz lineararray transducer.FINDINGS:RIGHTCFA prox 137          |
| biphasicDFA prox 71 biphasicSFA prox 309 biphasicSFA mid 91 biphasicSFA distal 127       |
| biphasicPOP mid 96 biphasicATA prox 69 biphasicATA distal 145 biphasicPTA prox 50        |
| biphasicPTA distal 58 biphasicPERA prox 74 biphasicPERA distal 0  absentIMPRESSION:1.    |
| Right leg shows biphasic inflow with a greater than 50% stenosis atthe origin of the     |
 
| superficial femoral artery (137-309).  Biphasicoutflow.2. Two vessel runoff to the right |
|  foot.Signed by: Jamilah Hoffman Date/Time: 2019 3:11 PM                       |
|RIGHT                                                                                    |
|CFA prox 137 biphasic                                                                     |
|DFA prox 71 biphasic                                                                      |
|SFA prox 309 biphasic                                                                     |
|SFA mid 91 biphasic                                                                       |
|SFA distal 127 biphasic                                                                   |
|POP mid 96 biphasic                                                                       |
|GIL prox 69 biphasic                                                                      |
|GIL distal 145 biphasic                                                                   |
|PTA prox 50 biphasic                                                                      |
|PTA distal 58 biphasic                                                                    |
|WINIFRED prox 74 biphasic                                                                     |
|WINIFRED distal 0  absent                                                                     |
|IMPRESSION:                                                                              |
|1. Right leg shows biphasic inflow with a greater than 50% stenosis at                    |
|the origin of the superficial femoral artery (137-309).  Biphasic                         |
|outflow.                                                                                 |
|2. Two vessel runoff to the right foot.                                                   |
|Signed by: Eugenio Hoffman                                                                |
|Sign Date/Time: 2019 3:11 PM                                                        |
+------------------------------------------------------------------------------------------+
 
 
 
+--------------------+------------------+--------------------+--------------+
| Performing         | Address          | City/State/Zipcode | Phone Number |
| Organization       |                  |                    |              |
+--------------------+------------------+--------------------+--------------+
|   [facility] |   [address] | [place] [address] |              |
+--------------------+------------------+--------------------+--------------+
 Renal function panel (2019  8:47 AM)
 
+----------------+--------------------------+-------------------+-----------------+
| Component      | Value                    | Ref Range         | Performed At    |
+----------------+--------------------------+-------------------+-----------------+
| SODIUM         | 139                      | 135 - 145 mmol/L  | [facility] |
+----------------+--------------------------+-------------------+-----------------+
| POTASSIUM      | 4.4                      | 3.5 - 4.9 mmol/L  | [facility] |
+----------------+--------------------------+-------------------+-----------------+
| CHLORIDE       | 99                       | 99 - 109 mmol/L   | [facility] |
+----------------+--------------------------+-------------------+-----------------+
| CO2            | 32                       | 23 - 32 mmol/L    | [facility] |
+----------------+--------------------------+-------------------+-----------------+
| ANION GAP AGAP | 12                       | 5 - 20 mmol/L     | [facility] |
+----------------+--------------------------+-------------------+-----------------+
| GLUCOSE        | 197 (H)                  | 65 - 99 mg/dL     | [facility] |
+----------------+--------------------------+-------------------+-----------------+
| BUN            | 24                       | 8 - 25 mg/dL      | [facility] |
+----------------+--------------------------+-------------------+-----------------+
| CREATININE     | 6.5 (H)                  | 0.50 - 1.00 mg/dL | [facility] |
+----------------+--------------------------+-------------------+-----------------+
| CALCIUM        | 8.7                      | 8.5 - 10.5 mg/dL  | [facility] |
+----------------+--------------------------+-------------------+-----------------+
| Albumin        | 2.2 (L)                  | 3.3 - 4.8 g/dL    | [facility] |
+----------------+--------------------------+-------------------+-----------------+
| PHOSPHORUS     | 4.4                      | 2.3 - 4.8 mg/dL   | [facility] |
+----------------+--------------------------+-------------------+-----------------+
| EGFR           | 6 (L)Comment: GFR <60:   | >60 mL/min/1.73m2 | [facility] |
 
|                | CHRONIC KIDNEY DISEASE,  |                   |                 |
|                | IF FOUND OVER A 3 MONTH  |                   |                 |
|                | PERIOD.GFR <15: KIDNEY   |                   |                 |
|                | FAILURE.FOR       |                   |                 |
|                | [employer]IC[employer], MULTIPLY THE  |                   |                 |
|                | CALCULATED GFR BY        |                   |                 |
|                | 1.210.This eGFR is       |                   |                 |
|                | calculated using the     |                   |                 |
|                | MDRD IDMS traceable      |                   |                 |
|                | equation.Testing         |                   |                 |
|                | performed at [facility];[address]     |                   |                 |
|                | [address];[place]   |                   |                 |
|                | [id number]                    |                   |                 |
+----------------+--------------------------+-------------------+-----------------+
 
 
 
+----------+
| Specimen |
+----------+
| Blood    |
+----------+
 
 
 
+-------------------+------------------+--------------------+--------------+
| Performing        | Address          | City/State/Zipcode | Phone Number |
| Organization      |                  |                    |              |
+-------------------+------------------+--------------------+--------------+
|   [facility] |   [address] [facility] | [place] [address] |              |
+-------------------+------------------+--------------------+--------------+
 Troponin I (2019  5:47 PM)Only the most recent of 2 results within the time period is
 included.
 
+------------+--------------------------+-------------------+-----------------+
| Component  | Value                    | Ref Range         | Performed At    |
+------------+--------------------------+-------------------+-----------------+
| TROPONIN I | 0.061Comment:   0.00 to  | 0.00 - 0.10 ng/mL | [facility] |
|            | 0.10     CONSISTENT WITH |                   |                 |
|            |  NORMAL POPULATION0.11   |                   |                 |
|            | to 0.60     CONSISTENT   |                   |                 |
|            | WITH INCREASED RISK FOR  |                   |                 |
|            | ADVERSE OUTCOMES>        |                   |                 |
|            | 0.60                     |                   |                 |
|            | CONSISTENT WITH WHO      |                   |                 |
|            | CRITERIA FOR ACUTE MI    |                   |                 |
|            | Testing performed at     |                   |                 |
|            | [facility];[address] [place] [address]            |                   |                 |
|            | [facility];[place] [address]   |                   |                 |
+------------+--------------------------+-------------------+-----------------+
 
 
 
+----------+
| Specimen |
+----------+
| Blood    |
+----------+
 
 
 
 
+-------------------+------------------+--------------------+--------------+
| Performing        | Address          | City/State/Zipcode | Phone Number |
| Organization      |                  |                    |              |
+-------------------+------------------+--------------------+--------------+
|   [facility] |   [address] | [place] [address] |              |
+-------------------+------------------+--------------------+--------------+
 ISTAT Troponin (2019 10:26 AM)
 
+----------------+-------+-----------+--------------+
| Component      | Value | Ref Range | Performed At |
+----------------+-------+-----------+--------------+
| POC Troponin I | 0.04  |           |              |
+----------------+-------+-----------+--------------+
 POC cardiac troponin (2019  9:54 AM)
 
+----------------------+--------------------------+-------------------+-----------------+
| Component            | Value                    | Ref Range         | Performed At    |
+----------------------+--------------------------+-------------------+-----------------+
| POC CARDIAC TROPONIN | 0.04Comment:   0.00 to   | 0.00 - 0.10 ng/mL | [facility] |
|                      | 0.10    Consistent with  |                   |                 |
|                      | normal population0.11 to |                   |                 |
|                      |  0.40    Consistent with |                   |                 |
|                      |  increased risk for      |                   |                 |
|                      | adverse                  |                   |                 |
|                      | outcomes>0.40            |                   |                 |
|                      |       Consistent with    |                   |                 |
|                      | WHO criteria for Acute   |                   |                 |
|                      | MI Testing performed at  |                   |                 |
|                      | [facility];[address]            |                   |                 |
|                      | [address];[place] [address]   |                   |                 |
+----------------------+--------------------------+-------------------+-----------------+
 
 
 
+-------------------+------------------+--------------------+--------------+
| Performing        | Address          | City/State/Zipcode | Phone Number |
| Organization      |                  |                    |              |
+-------------------+------------------+--------------------+--------------+
|   [facility] |   [address] | [place] [address] |              |
+-------------------+------------------+--------------------+--------------+
 EKG 12 LEAD UNIT PERFORMED (2019  9:44 AM)
 
+-------------------+--------------------------+-----------+--------------+
| Component         | Value                    | Ref Range | Performed At |
+-------------------+--------------------------+-----------+--------------+
| Ventricular Rate  | 93                       | BPM       | [facility]     |
+-------------------+--------------------------+-----------+--------------+
| Atrial Rate       | 93                       | BPM       | [facility]     |
+-------------------+--------------------------+-----------+--------------+
| P-R Interval      | 136                      | ms        | [facility]     |
+-------------------+--------------------------+-----------+--------------+
| QRS Duration      | 126                      | ms        | [facility]     |
+-------------------+--------------------------+-----------+--------------+
| Q-T Interval      | 416                      | ms        | [facility]     |
+-------------------+--------------------------+-----------+--------------+
| QTC Calculation   | 517                      | ms        | [facility]     |
| (Bezet)           |                          |           |              |
+-------------------+--------------------------+-----------+--------------+
| Calculated P Axis | 84                       | degrees   | [facility]     |
 
+-------------------+--------------------------+-----------+--------------+
| Calculated R Axis | -30                      | degrees   | [facility] EKG     |
+-------------------+--------------------------+-----------+--------------+
| Calculated T Axis | 37                       | degrees   | [facility] EKG     |
+-------------------+--------------------------+-----------+--------------+
| Diagnosis         | Normal sinus rhythmLeft  |           | [facility] EKG     |
|                   | axis                     |           |              |
|                   | deviationNon-specific    |           |              |
|                   | intra-ventricular        |           |              |
|                   | conduction blockCannot   |           |              |
|                   | rule out Septal infarct  |           |              |
|                   | , age                    |           |              |
|                   | undeterminedAbnormal     |           |              |
|                   | ECGWhen compared with    |           |              |
|                   | ECG of 2019       |           |              |
|                   | 16:30,QRS duration has   |           |              |
|                   | decreasedMinimal         |           |              |
|                   | criteria for Septal      |           |              |
|                   | infarct are now PresentT |           |              |
|                   |  wave inversion now      |           |              |
|                   | evident in Lateral       |           |              |
|                   | leadsThis ECG contains   |           |              |
|                   | Unconfirmed              |           |              |
|                   | Interpretation           |           |              |
|                   | Statements.    See ED    |           |              |
|                   | Record for Physician     |           |              |
|                   | Interpretation.          |           |              |
|                   | Confirmed by MUSE READ   |           |              |
|                   | ONLY, -COMPUTER (109),   |           |              |
|                   |  Sherman Grissom   |           |              |
|                   | Samm (123) on 2019  |           |              |
|                   | 3:51:40 AM               |           |              |
+-------------------+--------------------------+-----------+--------------+
 
 
 
+--------------+-------------------+--------------------+--------------+
| Performing   | Address           | City/State/Zipcode | Phone Number |
| Organization |                   |                    |              |
+--------------+-------------------+--------------------+--------------+
|   [facility]   |   [address] | [place] [address] |              |
+--------------+-------------------+--------------------+--------------+
 from Last 3 Months
 
 Insurance
 
 
+----------+--------+-------------+------+-------+----------------------+
| Payer    | Benefi | Subscriber  | Type | Phone | Address              |
|          | t Plan | ID          |      |       |                      |
|          |  /     |             |      |       |                      |
|          | Group  |             |      |       |                      |
+----------+--------+-------------+------+-------+----------------------+
| MEDICARE | MEDICA | [id number] |      |       |   [address]        |
|          | [place]     |             |      |       | [place] [id number] |
|          | IP-OP  |             |      |       |                      |
+----------+--------+-------------+------+-------+----------------------+
| MEDICAID | MEDICA | [id number]    |      |       |   [address]        |
|          | ID     |             |      |       | [place]          |
|          | [place] |             |      |       | [id number]           |
 
+----------+--------+-------------+------+-------+----------------------+
 
 
 
+----------------------+--------+-------------+--------+-------------+-----------------+
| Guarantor Name       | Accoun | Relation to | Date   | Phone       | Billing Address |
|                      | t Type |  Patient    | of     |             |                 |
|                      |        |             | Birth  |             |                 |
+----------------------+--------+-------------+--------+-------------+-----------------+
| CHERIE JO | Person | Self        | / |   Home:     |   [address]    |
|                      | al/Fam |             | 1950   | [phone] | [place]    |
|                      | [place]    |             |        | [address]        | [address]      |
+----------------------+--------+-------------+--------+-------------+-----------------+

## 2019-04-09 NOTE — XMS
Encounter Summary
  Created on: 2019
 
 Cherie Villalobos
 External Reference #: [id number]
 : 48
 Sex: Female
 
 Demographics
 
 
+-----------------------+--------------------------+
| Address               | [address]               |
|                       | [place]  [address] |
+-----------------------+--------------------------+
| Home Phone            | [phone]          |
+-----------------------+--------------------------+
| Preferred Language    | Unknown                  |
+-----------------------+--------------------------+
| Marital Status        |                   |
+-----------------------+--------------------------+
| Bahai Affiliation | [id number]                     |
+-----------------------+--------------------------+
| Race                  | Unknown                  |
+-----------------------+--------------------------+
| Ethnic Group          | Unknown                  |
+-----------------------+--------------------------+
 
 
 Author
 
 
+--------------+--------------------------------------------+
| Author       | [facility]  |
|              | [facility] [place]                                |
+--------------+--------------------------------------------+
| Organization | [facility]  |
|              | [facility] [place]                                |
+--------------+--------------------------------------------+
| Address      | Unknown                                    |
+--------------+--------------------------------------------+
| Phone        | Unavailable                                |
+--------------+--------------------------------------------+
 
 
 
 Support
 
 
+---------------+--------------+---------------------+-----------------+
| Name          | Relationship | Address             | Phone           |
+---------------+--------------+---------------------+-----------------+
| Anoop Villalobos | ECON         | [address], | [phone] |
|               |              |  [place]  [address]     |                 |
+---------------+--------------+---------------------+-----------------+
| Jennifer Dickson | ECON         | [place], [place]       | [phone] |
|               |              | [address]               |                 |
+---------------+--------------+---------------------+-----------------+
 
 
 
 
 Care Team Providers
 
 
+--------------------------+------+-----------------+
| Care Team Member Name    | Role | Phone           |
+--------------------------+------+-----------------+
| Araseli Montelongo Activity  | PCP  | [phone] |
| Professional             |      |                 |
+--------------------------+------+-----------------+
 
 
 
 Reason for Visit
 
 
+--------+----------+
| Reason | Comments |
+--------+----------+
| Other  | angina   |
+--------+----------+
 
 
 
 Encounter Details
 
 
+--------+-----------+----------------------+----------------------+----------------+
| Date   | Type      | Department           | Care Team            | Description    |
+--------+-----------+----------------------+----------------------+----------------+
| / | Telephone |   [facility]          |   Johnie John, | Other (angina) |
| 2019   |           | CARDIOLOGY  [address]    |  MD  [address] [place] |                |
|        |           | [place]  [place], |  [address]  [place],   |                |
|        |           |  [place] [address]       | [place] [address]             |                |
|        |           | [phone]         | [phone]         |                |
|        |           |                      | [phone] (Fax)   |                |
+--------+-----------+----------------------+----------------------+----------------+
 
 
 
 Social History
 
 
+---------------+------------+-----------+--------+------------------+
| Tobacco Use   | Types      | Packs/Day | Years  | Date             |
|               |            |           | Used   |                  |
+---------------+------------+-----------+--------+------------------+
| Former Smoker | Cigarettes | 1         | 30     | Quit: 2004 |
+---------------+------------+-----------+--------+------------------+
 
 
 
+---------------------+---+---+---+
| Smokeless Tobacco:  |   |   |   |
| Never Used          |   |   |   |
+---------------------+---+---+---+
 
 
 
 
+-------------+-----------+---------+----------+
| Alcohol Use | Drinks/We | oz/Week | Comments |
|             | ek        |         |          |
+-------------+-----------+---------+----------+
| No          |           |         |          |
+-------------+-----------+---------+----------+
 
 
 
+------------------+---------------+
| Sex Assigned at  | Date Recorded |
| Birth            |               |
+------------------+---------------+
| Not on file      |               |
+------------------+---------------+
 
 
 
+----------------+-------------+-------------+
| Job Start Date | Occupation  | Industry    |
+----------------+-------------+-------------+
| Not on file    | Not on file | Not on file |
+----------------+-------------+-------------+
 
 
 
+----------------+--------------+------------+
| Travel History | Travel Start | Travel End |
+----------------+--------------+------------+
 
 
 
+-------------------------------------+
| No recent travel history available. |
+-------------------------------------+
 documented as of this encounter
 
 Functional Status
 
 
+---------------------------------------------+----------+--------------------+
| Functional Status                           | Response | Date of Assessment |
+---------------------------------------------+----------+--------------------+
| Are you deaf or do you have serious         | No       | 2018         |
| difficulty hearing?                         |          |                    |
+---------------------------------------------+----------+--------------------+
| Are you blind or do you have serious        | No       | 2018         |
| difficulty seeing, even when wearing        |          |                    |
| glasses?                                    |          |                    |
+---------------------------------------------+----------+--------------------+
| Do you have serious difficulty walking or   | No       | 2018         |
| climbing stairs? (5 years old or older)     |          |                    |
+---------------------------------------------+----------+--------------------+
| Do you have difficulty dressing or bathing? | No       | 2018         |
|  (5 years old or older)                     |          |                    |
+---------------------------------------------+----------+--------------------+
| Because of a physical, mental, or emotional | No       | 2018         |
|  condition, do you have difficulty doing    |          |                    |
| errands alone such as visiting a doctor's   |          |                    |
 
| office or shopping? [15 years old or        |          |                    |
| older)]                                     |          |                    |
+---------------------------------------------+----------+--------------------+
 
 
 
+---------------------------------------------+----------+--------------------+
| Cognitive Status                            | Response | Date of Assessment |
+---------------------------------------------+----------+--------------------+
| Because of a physical, mental, or emotional | No       | 2018         |
|  condition, do you have serious difficulty  |          |                    |
| concentrating, remembering, or making       |          |                    |
| decisions? (5 years old or older)           |          |                    |
+---------------------------------------------+----------+--------------------+
 documented as of this encounter
 
 Plan of Treatment
 
 
+--------+---------+------------+----------------------+-------------+
| Date   | Type    | Specialty  | Care Team            | Description |
+--------+---------+------------+----------------------+-------------+
| / | Office  | Cardiology |   Johnie John, |             |
|    | Visit   |            |  MD  [address] |             |
|        |         |            |  [place],   |             |
|        |         |            | [place] [address]             |             |
|        |         |            | [phone]         |             |
|        |         |            | [phone] (Fax)   |             |
+--------+---------+------------+----------------------+-------------+
 documented as of this encounter
 
 Visit Diagnoses
 Not on filedocumented in this encounter

## 2019-04-09 NOTE — XMS
Encounter Summary
  Created on: 2019
 
 Cherie Villalobos
 External Reference #: [id number]
 : 48
 Sex: Female
 
 Demographics
 
 
+-----------------------+--------------------------+
| Address               | [address]               |
|                       | [place]  [address] |
+-----------------------+--------------------------+
| Home Phone            | [phone]          |
+-----------------------+--------------------------+
| Preferred Language    | Unknown                  |
+-----------------------+--------------------------+
| Marital Status        |                   |
+-----------------------+--------------------------+
| Sikh Affiliation | [id number]                     |
+-----------------------+--------------------------+
| Race                  | Unknown                  |
+-----------------------+--------------------------+
| Ethnic Group          | Unknown                  |
+-----------------------+--------------------------+
 
 
 Author
 
 
+--------------+-----------------------+
| Author       | [facility] |
+--------------+-----------------------+
| Organization | [facility] |
+--------------+-----------------------+
| Address      | Unknown               |
+--------------+-----------------------+
| Phone        | Unavailable           |
+--------------+-----------------------+
 
 
 
 Support
 
 
+---------------+--------------+---------------------+-----------------+
| Name          | Relationship | Address             | Phone           |
+---------------+--------------+---------------------+-----------------+
| Anoop Villalobos | ECON         | [place], | [phone] |
|               |              |  [place]  [address]     |                 |
+---------------+--------------+---------------------+-----------------+
| Jennifer Dickson | ECON         | [place]       | [phone] |
|               |              | [address]               |                 |
+---------------+--------------+---------------------+-----------------+
 
 
 
 
 Care Team Providers
 
 
+-----------------------+------+-----------------+
| Care Team Member Name | Role | Phone           |
+-----------------------+------+-----------------+
| Ivy Couch DO      | PCP  | [phone] |
+-----------------------+------+-----------------+
 
 
 
 Reason for Visit
 
 
+--------------+-------------------------------------------------------------------+
| Reason       | Comments                                                          |
+--------------+-------------------------------------------------------------------+
| Post-op Exam | Post op, amputation, left foot. Patient pain level is 3/10 and    |
|              | relates she Fx her right hallux. Patient is unsure when her       |
|              | injury took place and went to [facility] ED and had xrays. Patient      |
|              | states she has been walking on the toe since going to the ED.     |
|              | Patient states her left foot continues to phantom pain. Patient   |
|              | states that other than phantom pain her left foot is doing well.  |
+--------------+-------------------------------------------------------------------+
 
 
 
 Encounter Details
 
 
+--------+---------+----------------------+----------------------+----------------------+
| Date   | Type    | Department           | Care Team            | Description          |
+--------+---------+----------------------+----------------------+----------------------+
| / | Office  |   [facility] |   Srinivasan Jordan,  | Closed nondisplaced  |
| 2019   | Visit   |  and Ankle  [address]      | DPM  [address], | fracture of distal   |
|        |         | [address]   |  [address]  [place],  | phalanx of right     |
|        |         | [facility] [place] [place]         | [place] [address]             | great toe with       |
|        |         | [address]           | [phone]         | routine healing,     |
|        |         | [phone]         | [phone] (Fax)   | subsequent encounter |
|        |         |                      |                      |  (Primary Dx);       |
|        |         |                      |                      | Post-operative       |
|        |         |                      |                      | state; Toe pain,     |
|        |         |                      |                      | right                |
+--------+---------+----------------------+----------------------+----------------------+
 
 
 
 Social History
 
 
+---------------+------------+-----------+--------+------------------+
| Tobacco Use   | Types      | Packs/Day | Years  | Date             |
|               |            |           | Used   |                  |
+---------------+------------+-----------+--------+------------------+
| Former Smoker | Cigarettes | 1         | 30     | Quit: 2004 |
+---------------+------------+-----------+--------+------------------+
 
 
 
 
+---------------------+---+---+---+
| Smokeless Tobacco:  |   |   |   |
| Never Used          |   |   |   |
+---------------------+---+---+---+
 
 
 
+------------------------------+
| Comments: quite smoking  |
+------------------------------+
 
 
 
+-------------+-----------+---------+----------+
| Alcohol Use | Drinks/We | oz/Week | Comments |
|             | ek        |         |          |
+-------------+-----------+---------+----------+
| No          |           |         |          |
+-------------+-----------+---------+----------+
 
 
 
+------------------+---------------+
| Sex Assigned at  | Date Recorded |
| Birth            |               |
+------------------+---------------+
| Not on file      |               |
+------------------+---------------+
 as of this encounter
 
 Last Filed Vital Signs
 
 
+-------------------+---------------------+-------------------------+
| Vital Sign        | Reading             | Time Taken              |
+-------------------+---------------------+-------------------------+
| Blood Pressure    | 124/60              | 2019  2:40 PM PST |
+-------------------+---------------------+-------------------------+
| Pulse             | 92                  | 2019  2:40 PM PST |
+-------------------+---------------------+-------------------------+
| Temperature       | 36.8   C (98.2   F) | 2019  2:40 PM PST |
+-------------------+---------------------+-------------------------+
| Respiratory Rate  | -                   | -                       |
+-------------------+---------------------+-------------------------+
| Oxygen Saturation | 97%                 | 2019  2:40 PM PST |
+-------------------+---------------------+-------------------------+
| Inhaled Oxygen    | -                   | -                       |
| Concentration     |                     |                         |
+-------------------+---------------------+-------------------------+
| Weight            | -                   | -                       |
+-------------------+---------------------+-------------------------+
| Height            | -                   | -                       |
+-------------------+---------------------+-------------------------+
| Body Mass Index   | -                   | -                       |
+-------------------+---------------------+-------------------------+
 in this encounter
 
 Progress Notes
 Srinivasan Jordan DPM - 2019  2:45 PM PSTFormatting of this note may be different fro
m the original.
 
  
 Subjective: 
  Patient ID: Cherie Villalobos is a 68 y.o. female.
 Chief Complaint 
 Patient presents with 
   Post-op Exam 
   Post op, amputation, left foot. Patient pain level is 3/10 and relates she Fx her right h
allux. Patient is unsure when her injury took place and went to [facility] ED and had xrays. Cindi
nt states she has been walking on the toe since going to the ED.  Patient states her left fo
ot continues to phantom pain. Patient states that other than phantom pain her left foot is d
oing well.  
  
 
 HPI
 Patient returns today status post TMA left on 18.  She reports that she suffered a fa
ll and may have broken her right great toe.  Initially in the hospital she reports that she 
would told she would need amputation of the toe.  It is looking better over time but slowly.
 Her left foot is healing well after Trans-metatarsal amputation, no concerns with that. 
 
 The following portions of the patient's history were reviewed and updated as appropriate an
d is available elsewhere in the record: allergies, current medications, past family history,
 past medical history, past social history, past surgical history and problem list.
 
 ROS
 Denies any nausea, vomiting, fever, chills, shortness of breath, chest pain, or calf pain 
 
     
 Objective: 
 /60  | Pulse 92  | Temp 98.2 F (36.8 C)  | SpO2 97% 
 General observation:
 Constitutional: The patient is alert and oriented to person, place and time
 Psychiatric: The patient has normal affect and mood; her speech is normal; her behavior is 
normal.
 Respiratory: Effort normal and breath sounds normal. No accessory muscle usage. No respirat
ory distress.   
 
 Lower Extremity Examination:
 Trans-metatarsal amputation left, site healing well, two very small superficial wounds
 Wound, irregular and linear dorsal right great toe, no erythema, mild edema of toe
 No skin mottling. No hyperesthesias or paresthesias.
 No calf or thigh pain. Negative Homans sign. 
 pain on palpation of the interphalangeal joint of the hallux right dorsal
 
 Xr Toe Right 2 View
 
 Result Date: 2019
 No radiographic evidence of osteomyelitis seen.  If further assessment is warranted, consid
er correlation with MRI. Potential acute fracture through the base of the distal phalanx. Si
gned by: Gavin South Sign Date/Time: 2019 5:15 PM
 
 Us Lower Extremty  Arterial Right
 
 Result Date: 2019
 1. Right leg shows biphasic inflow with a greater than 50% stenosis at the origin of the dobbins
perficial femoral artery ([id number]).  Biphasic outflow. 2. Two vessel runoff to the right jeanne
t. Signed by: Eugenio Hoffman Sign Date/Time: 2019 3:11 PM
 
 Radiographs: .  See epic chart for complete read
 
 X-ray Foot Left
 
 
 Result Date: 2018
 Amputation of the left forefoot at the level of the proximal metatarsals. Surgical cutaneou
s staples are present. No radiographic evidence for osteomyelitis. Normal alignment of the h
indfoot. Mild degeneration of the midfoot. Electronically signed by Oleksandr Lemus MD on 2018 10:12 AM
 
 Ct Head Without Contrast
 
 Result Date: 2018
 1.  No acute intracranial pathology. Electronically signed by Steven Samano MD on  5:28 PM
 
 X-ray Chest 1 View
 
 Result Date: 2019
 1.  Small loculated pleural fluid collection seen overlying the lateral left heart border i
n the lower left chest.  There may also be a small pleural effusion at the right lung base w
hich is layering posteriorly. 2.  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 Electronically s
igned by Eugenio Hoffman DO on 2019 4:59 PM
 
 Angioplasty 18:
 1. Aorta with narrow aortic bifurcation with moderate stenosis of proximal left common errol
c artery. 
 2. Left SFA with moderate stenosis proximally. 
 3. Single vessel runoff to left foot via left anterior tibial artery. 
 4. Left posterior tibial artery and peroneal artery were occluded with reconstitution of di
stal posterior tibial artery at the left ankle via collaterals. 
 5. If forefoot amputation does not heal, may need popliteal to posterior tibial artery bypa
ss versus antegrade access of left common femoral artery with posterior tibial artery recana
lization. 
  
 Electronically signed by Kirt Mclaughlin MD on 2018 10:51 AM 
 
    
 Assessment and Plan: 
 S/p: TMA left on 18
 DM, ESRD, PAD
 Healing open fracture of right great toe?
 
 Radiographs taken, read and reviewed of involved foot/ankle and findings were discussed wit
h the patient
 Irregularity at the base of the distal phalanx concerning for avulsion fracture
 Wound appears stable dorsal right great toe
 Dressing change/applied today with dry sterile
 Rx diabetic shoes with toe filler left
 Continue use of removable cast boot until shoes received
 Fit with surgical shoe right, wear for all activities until DM shoes obtained
 Dress wound right great toe with bacitracin ointment daily with dry sterile dressing
 F/u 2 weeks
 
 Srinivasan Jordan DPM
  in this encounter
 
 Plan of Treatment
 
 
+--------+---------+-----------+----------------------+-------------+
| Date   | Type    | Specialty | Care Team            | Description |
+--------+---------+-----------+----------------------+-------------+
 
| 04/10/ | Office  | Podiatry  |   Srinivasan Jordan FAY,  |             |
|    | Visit   |           | DPM  [address], |             |
|        |         |           |  [address]  [place],  |             |
|        |         |           | [place] [address]             |             |
|        |         |           | [phone]         |             |
|        |         |           | [phone] (Fax)   |             |
+--------+---------+-----------+----------------------+-------------+
 as of this encounter
 
 Results
 X-ray foot right 3+ views (2019  2:58 PM)
 
+------------------------------------------------------------------------+--------------+
| Impressions                                                            | Performed At |
+------------------------------------------------------------------------+--------------+
|   Linear lucency/irregularity at the distal 1st phalanx, may represent |   [facility]     |
|   minimally displaced fracture.  Signed by: Oleksandr Lemus  Sign         | RADIOLOGY    |
| Date/Time: 2019 10:20 AM                                         |              |
+------------------------------------------------------------------------+--------------+
 
 
 
+------------------------------------------------------------------------+--------------+
| Narrative                                                              | Performed At |
+------------------------------------------------------------------------+--------------+
|   RIGHT FOOT THREE VIEWS  CLINICAL INFORMATION:  Possible fracture of  |   [facility]     |
| right hallux.  COMPARISON:  XR TOES RIGHT (2019); XR FOOT LEFT    | RADIOLOGY    |
| (2018);  FINDINGS:  No acute fracture or dislocation.  Small     |              |
| calcaneal plantar enthesophyte.  Linear lucency/irregularity at the    |              |
| distal 1st phalanx, may represent  minimally displaced fracture.  Mild |              |
|  midfoot degeneration.                                                 |              |
+------------------------------------------------------------------------+--------------+
 
 
 
+-------------------------------------------------------------------------+
| Procedure Note                                                          |
+-------------------------------------------------------------------------+
|   Lauro Baldwin Results In - 2019 10:23 AM PST  RIGHT FOOT THREE VIEWS |
| CLINICAL INFORMATION:                                                   |
| Possible fracture of right hallux.                                      |
| COMPARISON:                                                             |
| XR TOES RIGHT (2019); XR FOOT LEFT (2018);                   |
| FINDINGS:                                                               |
| No acute fracture or dislocation.                                       |
| Small calcaneal plantar enthesophyte.                                   |
| Linear lucency/irregularity at the distal 1st phalanx, may represent    |
| minimally displaced fracture.                                           |
| Mild midfoot degeneration.                                              |
| IMPRESSION:                                                             |
| Linear lucency/irregularity at the distal 1st phalanx, may represent    |
| minimally displaced fracture.                                           |
| Signed by: Oleksandr Lemus                                                 |
| Sign Date/Time: 2019 10:20 AM                                     |
+-------------------------------------------------------------------------+
 
 
 
+--------------------+------------------+--------------------+--------------+
| Performing         | Address          | City/State/Zipcode | Phone Number |
 
| Organization       |                  |                    |              |
+--------------------+------------------+--------------------+--------------+
|   [facility] |   [address] | [place] [address] |              |
+--------------------+------------------+--------------------+--------------+
 X-ray foot left 3 + views (2019  2:58 PM)
 
+----------------------------------------------------------------------+--------------+
| Impressions                                                          | Performed At |
+----------------------------------------------------------------------+--------------+
|   No acute fracture.    No radiographic evidence for osteomyelitis   |   [facility]     |
| Signed by: Oleksandr Lemus Date/Time: 2019 10:21 AM         | RADIOLOGY    |
+----------------------------------------------------------------------+--------------+
 
 
 
+------------------------------------------------------------------------+--------------+
| Narrative                                                              | Performed At |
+------------------------------------------------------------------------+--------------+
|   LEFT FOOT THREE VIEWS  CLINICAL INFORMATION:  8 weeks post op,       |   [facility]     |
| forefoot amputation.  COMPARISON:  XR TOES RIGHT (2019); XR FOOT  | RADIOLOGY    |
| LEFT (2018); XR FOOT LEFT  (2018);  FINDINGS:  Amputation  |              |
| of the forefoot at the level of the proximal metacarpals.  No          |              |
| radiographic evidence for osteomyelitis.  No acute fractures.          |              |
+------------------------------------------------------------------------+--------------+
 
 
 
+------------------------------------------------------------------------+
| Procedure Note                                                         |
+------------------------------------------------------------------------+
|   Lauro Baldwin Results In 2019 10:25 AM PST  LEFT FOOT THREE VIEWS |
| CLINICAL INFORMATION:                                                  |
| 8 weeks post op, forefoot amputation.                                  |
| COMPARISON:                                                            |
| XR TOES RIGHT (2019); XR FOOT LEFT (2018); XR FOOT LEFT     |
| (2018);                                                          |
| FINDINGS:                                                              |
| Amputation of the forefoot at the level of the proximal metacarpals.   |
| No radiographic evidence for osteomyelitis.                            |
| No acute fractures.                                                    |
| IMPRESSION:                                                            |
| No acute fracture.  No radiographic evidence for osteomyelitis         |
| Signed by: Oleksandr Lemus                                                |
| Sign Date/Time: 2019 10:21 AM                                    |
+------------------------------------------------------------------------+
 
 
 
+--------------------+------------------+--------------------+--------------+
| Performing         | Address          | City/State/Zipcode | Phone Number |
| Organization       |                  |                    |              |
+--------------------+------------------+--------------------+--------------+
|   [facility] |   [address] | [place] [address] |              |
+--------------------+------------------+--------------------+--------------+
 in this encounter
 
 Visit Diagnoses
 
 
+-----------------------------------------------------------------------------------+
 
| Diagnosis                                                                         |
+-----------------------------------------------------------------------------------+
|   Closed nondisplaced fracture of distal phalanx of right great toe with routine  |
| healing, subsequent encounter - Primary                                           |
+-----------------------------------------------------------------------------------+
|   Post-operative state                                                            |
+-----------------------------------------------------------------------------------+
|   Other postprocedural status                                                     |
+-----------------------------------------------------------------------------------+
|   Toe pain, right                                                                 |
+-----------------------------------------------------------------------------------+
|   Pain in limb                                                                    |
+-----------------------------------------------------------------------------------+

## 2019-04-09 NOTE — XMS
Encounter Summary
  Created on: 2019
 
 Cherie Villalobos
 External Reference #: [id number]
 : 48
 Sex: Female
 
 Demographics
 
 
+-----------------------+--------------------------+
| Address               | [address]               |
|                       | [place]  [address] |
+-----------------------+--------------------------+
| Home Phone            | [phone]          |
+-----------------------+--------------------------+
| Preferred Language    | Unknown                  |
+-----------------------+--------------------------+
| Marital Status        |                   |
+-----------------------+--------------------------+
| Adventist Affiliation | 1013                     |
+-----------------------+--------------------------+
| Race                  | Unknown                  |
+-----------------------+--------------------------+
| Ethnic Group          | Unknown                  |
+-----------------------+--------------------------+
 
 
 Author
 
 
+--------------+--------------------------------------------+
| Author       | [facility]  |
|              | and [place]                                |
+--------------+--------------------------------------------+
| Organization | [facility]  |
|              | and [place]                                |
+--------------+--------------------------------------------+
| Address      | Unknown                                    |
+--------------+--------------------------------------------+
| Phone        | Unavailable                                |
+--------------+--------------------------------------------+
 
 
 
 Support
 
 
+---------------+--------------+---------------------+-----------------+
| Name          | Relationship | Address             | Phone           |
+---------------+--------------+---------------------+-----------------+
| Anoop Villalobos | ECON         | [address] [place], | [phone] |
|               |              |  [place]  [address]     |                 |
+---------------+--------------+---------------------+-----------------+
| Jennifer Dickson | ECON         | [place]       | [phone] |
|               |              | [phone]               |                 |
+---------------+--------------+---------------------+-----------------+
 
 
 
 
 Care Team Providers
 
 
+-----------------------+------+-------------+
| Care Team Member Name | Role | Phone       |
+-----------------------+------+-------------+
 PCP  | Unavailable |
+-----------------------+------+-------------+
 
 
 
 Reason for Visit
 
 
+---------------+----------+
| Reason        | Comments |
+---------------+----------+
| Device Check  | Billed   |
| (Remote)      |          |
+---------------+----------+
 
 
 
 Encounter Details
 
 
+--------+----------+----------------------+----------------------+----------------------+
| Date   | Type     | Department           | Care Team            | Description          |
+--------+----------+----------------------+----------------------+----------------------+
| / | Implant  |   [facility]          |   Johnie John, | Remote Device        |
| 2019   | Monitor  | CARDIOLOGY  [address]    |  MD  [address] [place] | Interrogation        |
|        |          | [place]  [place], |  St.  [place],   | (Primary Dx); ICD    |
|        |          |  [place] [address]       | [place] [address]             | (implantable         |
|        |          | [phone]         | [phone]         | cardioverter-defibri |
|        |          |                      | [phone] (Fax)   | llator) [place]       |
|        |          |                      |                      | Scientific  18   |
|        |          |                      |                      | [place];           |
|        |          |                      |                      | Cardiomyopathy,      |
|        |          |                      |                      | unspecified type     |
|        |          |                      |                      | (HCC)                |
+--------+----------+----------------------+----------------------+----------------------+
 
 
 
 Social History
 
 
+---------------+------------+-----------+--------+------------------+
| Tobacco Use   | Types      | Packs/Day | Years  | Date             |
|               |            |           | Used   |                  |
+---------------+------------+-----------+--------+------------------+
| Former Smoker | Cigarettes | 1         | 30     | Quit: 2004 |
+---------------+------------+-----------+--------+------------------+
 
 
 
+---------------------+---+---+---+
 
| Smokeless Tobacco:  |   |   |   |
| Never Used          |   |   |   |
+---------------------+---+---+---+
 
 
 
+-------------+-----------+---------+----------+
| Alcohol Use | Drinks/We | oz/Week | Comments |
|             | ek        |         |          |
+-------------+-----------+---------+----------+
| No          |           |         |          |
+-------------+-----------+---------+----------+
 
 
 
+------------------+---------------+
| Sex Assigned at  | Date Recorded |
| Birth            |               |
+------------------+---------------+
| Not on file      |               |
+------------------+---------------+
 
 
 
+----------------+-------------+-------------+
| Job Start Date | Occupation  | Industry    |
+----------------+-------------+-------------+
| Not on file    | Not on file | Not on file |
+----------------+-------------+-------------+
 
 
 
+----------------+--------------+------------+
| Travel History | Travel Start | Travel End |
+----------------+--------------+------------+
 
 
 
+-------------------------------------+
| No recent travel history available. |
+-------------------------------------+
 documented as of this encounter
 
 Functional Status
 
 
+---------------------------------------------+----------+--------------------+
| Functional Status                           | Response | Date of Assessment |
+---------------------------------------------+----------+--------------------+
| Are you deaf or do you have serious         | No       | 2018         |
| difficulty hearing?                         |          |                    |
+---------------------------------------------+----------+--------------------+
| Are you blind or do you have serious        | No       | 2018         |
| difficulty seeing, even when wearing        |          |                    |
| glasses?                                    |          |                    |
+---------------------------------------------+----------+--------------------+
| Do you have serious difficulty walking or   | No       | 2018         |
| climbing stairs? (5 years old or older)     |          |                    |
+---------------------------------------------+----------+--------------------+
| Do you have difficulty dressing or bathing? | No       | 2018         |
 
|  (5 years old or older)                     |          |                    |
+---------------------------------------------+----------+--------------------+
| Because of a physical, mental, or emotional | No       | 2018         |
|  condition, do you have difficulty doing    |          |                    |
| errands alone such as visiting a doctor's   |          |                    |
| office or shopping? [15 years old or        |          |                    |
| older)]                                     |          |                    |
+---------------------------------------------+----------+--------------------+
 
 
 
+---------------------------------------------+----------+--------------------+
| Cognitive Status                            | Response | Date of Assessment |
+---------------------------------------------+----------+--------------------+
| Because of a physical, mental, or emotional | No       | 2018         |
|  condition, do you have serious difficulty  |          |                    |
| concentrating, remembering, or making       |          |                    |
| decisions? (5 years old or older)           |          |                    |
+---------------------------------------------+----------+--------------------+
 documented as of this encounter
 
 Plan of Treatment
 
 
+--------+---------+------------+----------------------+-------------+
| Date   | Type    | Specialty  | Care Team            | Description |
+--------+---------+------------+----------------------+-------------+
| / | Office  | Cardiology |   Johnie John, |             |
|    | Visit   |            |  MD  [address] |             |
|        |         |            |  St. [place],   |             |
|        |         |            | [place] [address]             |             |
|        |         |            | [phone]         |             |
|        |         |            | [phone] (Fax)   |             |
+--------+---------+------------+----------------------+-------------+
 documented as of this encounter
 
 Procedures
 
 
+-----------------+--------+-------------+----------------------+----------------------+
| Procedure Name  | Priori | Date/Time   | Associated Diagnosis | Comments             |
|                 | ty     |             |                      |                      |
+-----------------+--------+-------------+----------------------+----------------------+
| DEVICE          | Routin | 2019  |   Remote Device      |   Results for this   |
| INTERROGATION-  | e      | 23:59 PDT   | Interrogation   ICD  | procedure are in the |
| REMOTE          |        |             | (implantable         |  results section.    |
|                 |        |             | cardioverter-defibri |                      |
|                 |        |             | llator) [place]       |                      |
|                 |        |             | Scientific  18   |                      |
|                 |        |             | [place]ora            |                      |
|                 |        |             | Cardiomyopathy,      |                      |
|                 |        |             | unspecified type     |                      |
|                 |        |             | ([facility])                |                      |
+-----------------+--------+-------------+----------------------+----------------------+
 documented in this encounter
 
 Results
 Device Interrogation - Remote (2019 23:59 PDT)
 
+-----------------------------------------------------------------------+--------------+
 
| Narrative                                                             | Performed At |
+-----------------------------------------------------------------------+--------------+
|   This result has an attachment that is not available.  Johnie        |   [facility]    |
| MD [place]         2019 15:05Date of Remote Interrogation:    |              |
| 19 Refer to [facility] documentation and remote PDF scanned into    |              |
| EPIC for remote interrogation results.    Data collected by Clementina SALAZAR     |              |
| NAHUN Maynard Presenting rhythm:    sinus rhythm 59-60 beats.0           |              |
| ventricular high rate episodes. No tachycardia therapies recommended  |              |
| or delivered.Histogram    good.     Battery longevity                 |              |
| 12    years.Apparent normal and stable device function.Device         |              |
| interrogation due in office in 2019.                        |              |
|recommended or delivered.                                              |              |
|Histogram    good.     Battery longevity 12    years.                 |              |
|Apparent normal and stable device function.                            |              |
|Device interrogation due in office in 2019.                  |              |
|                                                                       |              |
+-----------------------------------------------------------------------+--------------+
 
 
 
+--------------+---------+--------------------+--------------+
| Performing   | Address | City/State/Zipcode | Phone Number |
| Organization |         |                    |              |
+--------------+---------+--------------------+--------------+
|   [facility]    |         |                    |              |
+--------------+---------+--------------------+--------------+
 documented in this encounter
 
 Visit Diagnoses
 
 
+------------------------------------------------------------------------------------+
| Diagnosis                                                                          |
+------------------------------------------------------------------------------------+
|   Remote Device Interrogation  - Primary  Fitting and adjustment of automatic      |
| implantable cardiac defibrillator                                                  |
+------------------------------------------------------------------------------------+
|   ICD (implantable cardioverter-defibrillator) [place]  [address] [place] |
+------------------------------------------------------------------------------------+
|   Cardiomyopathy, unspecified type (HCC)                                           |
+------------------------------------------------------------------------------------+
 documented in this encounter

## 2019-04-09 NOTE — XMS
Encounter Summary
  Created on: 2019
 
 Cherie Villalobos
 External Reference #: [id number]
 : 48
 Sex: Female
 
 Demographics
 
 
+-----------------------+--------------------------+
| Address               | [address]               |
|                       | [place]  [address] |
+-----------------------+--------------------------+
| Home Phone            | [phone]          |
+-----------------------+--------------------------+
| Preferred Language    | Unknown                  |
+-----------------------+--------------------------+
| Marital Status        |                   |
+-----------------------+--------------------------+
| Restorationism Affiliation | [id number]                     |
+-----------------------+--------------------------+
| Race                  | Unknown                  |
+-----------------------+--------------------------+
| Ethnic Group          | Unknown                  |
+-----------------------+--------------------------+
 
 
 Author
 
 
+--------------+-----------------------+
| Author       | Sherry INSOMENIA |
+--------------+-----------------------+
| Organization | [facility] |
+--------------+-----------------------+
| Address      | Unknown               |
+--------------+-----------------------+
| Phone        | Unavailable           |
+--------------+-----------------------+
 
 
 
 Support
 
 
+---------------+--------------+---------------------+-----------------+
| Name          | Relationship | Address             | Phone           |
+---------------+--------------+---------------------+-----------------+
| Anoop Villalobos | ECON         | [place], | [phone] |
|               |              |  [place]  [address]     |                 |
+---------------+--------------+---------------------+-----------------+
| Jennifer Dickson | ECON         | [place]       | [phone] |
|               |              | [phone]               |                 |
+---------------+--------------+---------------------+-----------------+
 
 
 
 
 Care Team Providers
 
 
+-----------------------+------+-----------------+
| Care Team Member Name | Role | Phone           |
+-----------------------+------+-----------------+
| Ivy Couch DO      | PCP  | [phone] |
+-----------------------+------+-----------------+
 
 
 
 Reason for Visit
 
 
+-----------+------------------------------------------------------------------+
| Reason    | Comments                                                         |
+-----------+------------------------------------------------------------------+
| Foot Pain | possible infection in rt foot, was told to come in for a direct  |
|           | admit through the ER                                             |
+-----------+------------------------------------------------------------------+
 Auth/Cert
 
+--------+--------+-----------+--------------+--------------+---------------+
| Status | Reason | Specialty | Diagnoses /  | Referred By  | Referred To   |
|        |        |           | Procedures   | Contact      | Contact       |
+--------+--------+-----------+--------------+--------------+---------------+
|        |        | Internal  |   Diagnoses  |              |   [facility]    |
|        |        | Medicine  |  PVD         |              | [facility]   |
|        |        |           | (peripheral  |              | [address]  [address] |
|        |        |           | vascular     |              |  [address]   |
|        |        |           | disease)     |              | [place], [place]  |
|        |        |           | (HCC)  ESRD  |              | [address]  Phone: |
|        |        |           | (end stage   |              |  [phone] |
|        |        |           | renal        |              |   Fax:        |
|        |        |           | disease) on  |              | [phone]  |
|        |        |           | dialysis     |              |               |
|        |        |           | (HCC)        |              |               |
+--------+--------+-----------+--------------+--------------+---------------+
 
 
 
 
 Encounter Details
 
 
+--------+-----------+----------------------+----------------------+----------------------+
| Date   | Type      | Department           | Care Team            | Description          |
+--------+-----------+----------------------+----------------------+----------------------+
| / | Hospital  |   [facility]    |   [facility],        | PVD (peripheral      |
| 2019 - | Encounter | [address]   | MD Maria Luisa Jarrett      | vascular disease)    |
|        |           | [facility] | [address]           | (Formerly [facility]) (Primary Dx);  |
| / |           |   [address]     | [place] [address]   | ESRD (end stage      |
| 2019   |           | [place] [address]   | [phone]         | renal disease) on    |
|        |           | [phone]         | [phone] (Fax)   | dialysis (Formerly [facility]);      |
|        |           |                      | Moiz Josue      | Anemia in ESRD       |
|        |           |                      | MD [address] | (end-stage renal     |
|        |           |                      |  Blvd  [place], [place]  | disease) (Formerly [facility]);      |
|        |           |                      | [address]  [phone]  | Hypoalbuminemia;     |
|        |           |                      |  [phone] (Fax)  | Electrolyte          |
 
|        |           |                      |                      | imbalance risk       |
+--------+-----------+----------------------+----------------------+----------------------+
 
 
 
 Social History
 
 
+---------------+------------+-----------+--------+------------------+
| Tobacco Use   | Types      | Packs/Day | Years  | Date             |
|               |            |           | Used   |                  |
+---------------+------------+-----------+--------+------------------+
| Former Smoker | Cigarettes | 1         | 30     | Quit: 2004 |
+---------------+------------+-----------+--------+------------------+
 
 
 
+---------------------+---+---+---+
| Smokeless Tobacco:  |   |   |   |
| Never Used          |   |   |   |
+---------------------+---+---+---+
 
 
 
+------------------------------+
| Comments: quite smoking  |
+------------------------------+
 
 
 
+-------------+-----------+---------+----------+
| Alcohol Use | Drinks/We | oz/Week | Comments |
|             | ek        |         |          |
+-------------+-----------+---------+----------+
| No          |           |         |          |
+-------------+-----------+---------+----------+
 
 
 
+------------------+---------------+
| Sex Assigned at  | Date Recorded |
| Birth            |               |
+------------------+---------------+
| Not on file      |               |
+------------------+---------------+
 as of this encounter
 
 Last Filed Vital Signs
 
 
+-------------------+----------------------+-------------------------+
| Vital Sign        | Reading              | Time Taken              |
+-------------------+----------------------+-------------------------+
| Blood Pressure    | 120/56               | 2019 11:40 AM PST |
+-------------------+----------------------+-------------------------+
| Pulse             | 64                   | 2019 11:40 AM PST |
+-------------------+----------------------+-------------------------+
| Temperature       | 36.4   C (97.5   F)  | 2019 11:40 AM PST |
+-------------------+----------------------+-------------------------+
| Respiratory Rate  | 18                   | 2019 11:40 AM PST |
 
+-------------------+----------------------+-------------------------+
| Oxygen Saturation | 97%                  | 2019 11:40 AM PST |
+-------------------+----------------------+-------------------------+
| Inhaled Oxygen    | -                    | -                       |
| Concentration     |                      |                         |
+-------------------+----------------------+-------------------------+
| Weight            | 65.5 kg (144 lb 6.4  | 2019  1:11 PM PST |
|                   | oz)                  |                         |
+-------------------+----------------------+-------------------------+
| Height            | 177.8 cm (5' 10")    | 2019  7:11 PM PST |
+-------------------+----------------------+-------------------------+
| Body Mass Index   | 20.72                | 2019  1:11 PM PST |
+-------------------+----------------------+-------------------------+
 in this encounter
 
 Discharge Summaries
 Prudence Nicholson MD-R2 - 2019  1:04 PM PSTFormatting of this note may be different fr
om the original.
 [facility]
 Service:  Hospitalist
 Resident Discharge Summary
 
 Date of Admission:  2019
 Date of Discharge:  
 
 Discharge Provider:  Prudence Nicholson MD-R2
 Treatment Team: 
 Consulting Physician: Srinivasan Jordan DPM
 Consulting Physician: Ethan Awad MD
 Admitting Provider: Luiza Rosales MD
 Discharge Diagnoses:   
 
 Principal Problem:
   PVD (peripheral vascular disease) (Formerly [facility])
 Active Problems:
   ESRD (end stage renal disease) (HCC)
   Type 2 diabetes mellitus with chronic kidney disease on chronic dialysis, with long-term 
current use of insulin (HCC)
   Anemia in ESRD (end-stage renal disease) (HCC)
   Hypertension, essential
   Chronic systolic congestive heart failure (HCC)
   COPD (chronic obstructive pulmonary disease) (HCC)
   Paroxysmal atrial fibrillation (HCC)
   CAD (coronary artery disease)
 Resolved Problems:
   * No resolved hospital problems. *
 
 BRIEF HISTORY OF PRESENTATION:  
      Patient Summary:  Per Dr. Rosales's H and P from 2019: '67 y/o F with h/o ESRD
 on HD T,,Sat (Dr. Asher), DM2, PAD s/p angioplasty of LLE and transmetatarsal amputation 
of left foot 18 by Dr. Jordan due to osteomyelitis, CAD, s/p MI, CABG, AVR , HFrE
F with last EF 20 to 25%, s/p AICD, AF on chronic anticoagulation who was sent to ED by Dr. Elliott for evaluation of right LE. Patient reports that at HD yesterday it was noted that he
r right toes were red and dusky. She went to see Dr. Elliott this morning and he was concerne
d that right toes could be ischemic or infected and referred to ED. '
 
 HOSPITAL COURSE:  
 Vascular surgery was consulted from the emergency department, they did a selective catheter
ization of the left common femoral artery and placed an SMA stent. The patient tolerated the
 procedure well. Podiatry was consulted, they recommended wound care consultation for the op
 
en wound on her left foot. They also stated that her right foot did not need a procedure for
 the toes at this time. Infectious disease is waiting for blood cultures and Gram stain and 
culture of the wound site to narrow her IV antibiotics. She reported improved pain, no short
ness of breath, no nausea and vomiting, she is making a small amount of urine and had a willie
l movement today. She would like to go home. Physical therapy cleared her to go home with [facility] assist. Nephrology was consulted and reported that they were amenable to administering he
r IV antibiotics with dialysis. Infectious disease was consulted and agreed to this plan. Up
on discharge the patient was eating, drinking, urinating, having bowel movements.she was not
 having fevers, nausea, or vomiting. 
 
 No prescriptions prior to admission. 
 
 DISCHARGE EXAM
 Vital Signs:
 /56 (BP Location: Right upper arm)  | Pulse 64  | Temp 97.5 F (36.4 C) (Axillary)
  | Resp 18  | Ht 1.778 m (5' 10")  | Wt 65.5 kg (144 lb 6.4 oz)  | SpO2 97%  | Breastfeedin
g? No  | BMI 20.72 kg/m 
 
 Physical Exam
 General Appearance: Alert and cooperative, sitting up in a chair by the bed and appears to 
be in no acute distress. 
 
 HEENNT: Normocephalic and atraumatic. Hearing grossly intact. No nasal discharge. No trache
al deviation. 
 
 Cardiovascular: Rhythm and rate are regular. 2/6 systolic murmur heard best over the left u
pper sternal border. No gallops or rubs appreciated. Peripheral pulses 2+ on the right. No l
ower extremity edema.
 
 Pulmonary/Chest: Lungs are clear to auscultation bilaterally. Effort is normal. Normal jadon
th sounds. 
 
 Abdominal: Soft, nontender, nondistended. Normoactive bowel sounds. No guarding or rebound 
tenderness. 
 
 Musculoskeletal: Normal range of motion. Normal muscular development. Patient with forefoot
 amputation on the left. Left foot wrapped in new dressing, C/D/I. Right foot with erythema 
over the great big toe and cyanosis over the second toe, improved from prior. 
 
 Neurological: CN II-XII grossly intact. Oriented to person, place, and time. 
 
 Skin: Skin is warm and dry. No rash noted. 
 
 Psychiatric:The patient was able to demonstrate good judgement and reason, without hallucin
ations, abnormal affect or abnormal behaviors during the examination. 
 
 DATA
 
 Recent Labs
 Lab 1938 19
 1543 
 WBC 3.90 3.39* 5.53 
 HGB 9.8* 9.5* 9.4* 
 HCT 30.1* 29.2* 28.4* 
 * 125* 147* 
 NEUTOPHILPCT  --  66.95  --  
 MONOPCT  --  11.96  --  
 
 
 Recent Labs
 Lab 19
 0538 19
 0453 19
 1543 
  140 139 
 K 3.9 4.2 4.0 
 CL 97* 101 100 
 CO2 31 28 29 
 BUN 13 26* 23 
 CREATININE 4.8* 6.8* 6.1* 
 PROT  --   --  6.2* 
 BILITOT  --   --  0.4 
 ALT  --   --  21 
 AST  --   --  24 
 
 Phosphorus:  
 Lab Results 
 Component Value Date 
  PHOS 3.6 2019 
 
 Invalid input(s): LABALBU
 
 Recent Labs
 Lab 19
 0538 
 MG 2.3 
 
 Recent Labs
 Lab 19
 1543 
 CKTOTAL 28* 
 
 Intake/Output Summary (Last 24 hours) at 19 1717
 Last data filed at 19 0850
  Gross per 24 hour 
 Intake              380 ml 
 Output                0 ml 
 Net              380 ml 
 
 Microbiology: Blood cultures - NGTD
 
 Radiology
 Us Lower Extremty  Arterial Right
 
 Result Date: 2019
 1. Right leg shows biphasic inflow with a greater than 50% stenosis at the origin of the dobbins
perficial femoral artery (137309).  Biphasic outflow. 2. Two vessel runoff to the right jeanne
t. Signed by: Eugenio Hoffman Sign Date/Time: 2019 3:11 PM
 
 PLAN
 
 Disposition:  Home
 Condition:  Stable
 Code Status:  Full Code
 
 No discharge procedures on file.
 
 Follow up:
 [facility]
 
 [address] [facility] [id number] [phone]
 Follow up in 3 week(s)
 
 Ivy Couch, DO
 [address], [address]
 [facility] [address]
 [phone]
 
 Ivy Couch, DO
 [address]
 [facility] [address]
 [phone]
 
 In 1 week
 
 Andrés Elliott MD
 [address] [facility] [id number] [phone]
 
 Call office for appointment
 
  
 Medication List 
  
 START taking these medications  
 cefTAZidime 2 g injection
 QTY:  1 each
 Refills:  0
 Commonly known as:  FORTAZ
 Inject 2 g into the muscle After Hemodialysis (see admin instructions) for 7 days.
  
 vancomycin IVPB
 Refills:  0
 Commonly known as:  VANCOCIN
 Inject 750 mg into the vein After Hemodialysis (see admin instructions) for 7 days. Dilute 
in appropriate volume of IV fluid and give by slow IV infusion.
  
  
 CHANGE how you take these medications  
 losartan 25 MG tablet
 QTY:  30 tablet
 Refills:  0
 Commonly known as:  COZAAR
 Take 1 tablet by mouth daily.
 What changed:  how much to take
  
  
 CONTINUE taking these medications  
 * albuterol 108 (90 Base) MCG/ACT inhaler
 Refills:  0
 Commonly known as:  PROVENTIL HFA;VENTOLIN HFA
  
 * VENTOLIN  (90 Base) MCG/ACT inhaler
 Refills:  0
 Generic drug:  albuterol
  
 apixaban 2.5 MG tablet
 
 QTY:  60 tablet
 Refills:  0
 Commonly known as:  ELIQUIS
 Take 1 tablet by mouth 2 (two) times daily.
  
 atorvastatin 40 MG tablet
 QTY:  30 tablet
 Refills:  0
 Commonly known as:  LIPITOR
 Take 1 tablet by mouth nightly.
  
 bisacodyl 10 MG suppository
 Refills:  0
 Commonly known as:  DULCOLAX
  
 calcium acetate 667 MG capsule
 Refills:  0
 Commonly known as:  PHOSLO
  
 carvedilol 12.5 MG tablet
 QTY:  60 tablet
 Refills:  0
 Doctor's comments:  Hold for SBP less than 100
 Hold for HR less than 60
 Commonly known as:  COREG
 Take 1 tablet by mouth 2 (two) times daily with meals.
  
 clopidogrel 75 MG tablet
 QTY:  30 tablet
 Refills:  11
 Commonly known as:  PLAVIX
 Take 1 tablet by mouth daily.
  
 esomeprazole 20 MG capsule
 Refills:  0
 Commonly known as:  NEXIUM
  
 HYDROcodone-acetaminophen 5-325 MG per tablet
 QTY:  30 tablet
 Refills:  0
 Commonly known as:  NORCO
 Take 1 tablet by mouth every 4 (four) hours as needed.
  
 insulin aspart 100 UNIT/ML injection
 Refills:  0
 Commonly known as:  NOVOLOG
  
 insulin degludec 100 UNIT/ML injection
 Refills:  0
 Commonly known as:  TRESIBA
  
 isosorbide mononitrate 60 MG 24 hr tablet
 QTY:  30 tablet
 Refills:  1
 Take 1 tablet by mouth daily.
  
 loperamide 2 MG capsule
 Refills:  0
 Commonly known as:  IMODIUM
  
 
 LORazepam 1 MG tablet
 QTY:  30 tablet
 Refills:  0
 Commonly known as:  ATIVAN
 Take 1 tablet by mouth every 8 (eight) hours as needed for Anxiety.
  
 nitroGLYCERIN 0.4 MG SL tablet
 QTY:  30 tablet
 Refills:  0
 Doctor's comments:  Hold for SBP less than 100
 Commonly known as:  NITROSTAT
 Place 1 tablet under the tongue every 5 (five) minutes as needed for Chest pain.
  
 nortriptyline 25 MG capsule
 Refills:  0
 Commonly known as:  PAMELOR
  
 ondansetron 4 MG disintegrating tablet
 Refills:  0
 Commonly known as:  ZOFRAN-ODT
  
 oxybutynin 5 MG tablet
 Refills:  0
 Commonly known as:  DITROPAN
  
 prochlorperazine 5 MG tablet
 QTY:  60 tablet
 Refills:  11
 Doctor's comments:  Please consider 90 day supplies to promote better adherence
 TAKE ONE TABLET BY MOUTH TWICE DAILY AS NEEDED FOR NAUSEA
  
 ranitidine 150 MG tablet
 Refills:  0
 Commonly known as:  ZANTAC
  
 rOPINIRole 0.25 MG tablet
 Refills:  0
 Commonly known as:  REQUIP
  
 SLOW-MAG 71.5-119 MG Tbec
 Refills:  0
 Generic drug:  Magnesium Cl-Calcium Carbonate
  
 Vitamin D3 5000 units Caps
 Refills:  0
  
 Vortioxetine HBr 10 MG Tabs
 Refills:  0
  
  
 * This list has 2 medication(s) that are the same as other medications prescribed for you. 
Read the directions carefully, and ask your doctor or other care provider to review them wit
h you. 
  
  
  
 You might also be taking other medications not listed above. If you have questions about an
y of your other medications, talk to the person who prescribed them or your Primary Care Pro
vider. 
  
 
  
  
 STOP taking these medications  
 aspirin 81 MG EC tablet
  
  
  
 Where to Get Your Medications 
  
 Information about where to get these medications is not yet available  
 Ask your nurse or doctor about these medications
  cefTAZidime 2 g injection
  vancomycin IVPB
  
 
 Prudence Nicholson MD-R2
 2019
 5:17 PM
 
 
 Associated attestation - Moiz Josue MD - 2019  5:12 PM PSTPatient seen an
d examined along with residents prior to discharge. Discussed with Dr. Elliott and  Dr. Asher. 
Patient will receive IV antibiotics ceftazidime and vancomycin with each HD. She insists on 
going home and is cleared for discharge by Dr. Asher and Dr. Elliott. 
 I agree with the discharge summary of Dr. Nicholson. in this encounter
 
 Discharge Instructions
 Nesha Vanegas RN - 2019Formatting of this note may be different from the original.
 
 Peripheral Angiography
 Peripheral angiography is an outpatient procedure that makes a   map  of the vessels (ar
teries) in your lower body, legs, and arms, using X-ray and dye.This map can show where bloo
d flow may be blocked.
 Talk with your healthcare provider about the risks and complications of angiography. 
 Before the procedure
 Prepare for the peripheral angiography as follows:
  Tell your healthcare provider about all medicines you take and any allergies you may hav
e.
  Follow any directions you  re given for not eating or drinking before the procedure. If
 your provider says to take your normal medicines, swallow them with only small sips of wate
r.
  Arrange for a family member or friend to drive you home.
 During the procedure
 Here is what to expect:
  
 You may get medicine through an IV (intravenous) line to relax you. You  re given an injec
tion to numb the insertion site. Then, a tiny skin cut (incision) is made near an artery in 
your groin.
  Your provider inserts a thin tube (catheter) through the incision. He or she then thread
s the catheter into an artery while looking at a video monitor.
  Contrast   dye  is injected into the catheter to confirm position. You may feel warmt
h or pressure in your legs and back. You lie still as X-rays are taken. The catheter is then
 taken out.
 After the procedure
 You  ll be taken to a recovery area. A healthcare provider will apply pressure to the site
 for about 10 minutes. Your healthcare provider will tell you how long to lie down and keep 
the insertion site still.Your healthcare provider will discuss the results with you soon a
fter the procedure.
 Back at home
 On the day you get home, don  t drive, don  t exercise, avoid walking and taking stairs, 
 
and avoid bending and lifting. Your healthcare provider may give you other care instructions
.
 Call your healthcare provider
 Call your healthcare provider right away if:
  You notice a lump or bleeding at the insertion site
  You feel pain at the insertion site
  You become lightheaded or dizzy
  You have leg pain or numbness
  You do not urinate in 8 hours 
 Date Last Reviewed: 2016-2018 The "[employer]. [address] [place] [address]. All righ
ts reserved. This information is not intended as a substitute for professional medical care.
 Always follow your healthcare professional's instructions.
 
 Peripheral Arterial Angioplasty and Stent
 
 Peripheral arterial angioplasty is a procedure done to treat a narrowed or blocked artery i
n your arm or leg. This brings blood flow back to your arm or leg. It also helps ease sympto
ms. Sometimes a metal mesh tube called a stent may be put in your artery. This holds your ar
dirk open. The procedure is done by a specially trained doctor called an interventional radi
ologist. This sylvia doctor trained and certified by the American Board of Radiology to use m
inimally invasive image-guided procedures to diagnose and treat diseases.
 Before your procedure
 Follow any instructions you are given on how to get ready. This can include following any d
irections you  re given for not eating or drinking before the procedure.
 Tell your healthcare provider if you:
  Are allergic to X-ray dye (contrast medium) or other medicines
  Are breastfeeding
  Are pregnant or think you may be pregnant
  Have had any recent illnesses
  Have any medical conditions
 Tell your healthcare provider about any medicines you are taking. You may need to stop taki
ng all or some of these before the procedure. This includes:
  All prescription medicines
  Herbs, vitamins, and other supplements
  Over-the-counter medicines such as aspirin or ibuprofen
  Street drugs
 During your procedure
  An IV line is put into your vein. This is to give you fluids and medicines. You may be g
iven medicine to help you relax and make you sleepy (sedation). Medicine will be put on your
 skin where the cut (incision) will be done. This is to keep you from feeling pain at the si
te.
  A very small incision is made at the insertion site. A thin, flexible tube (catheter) is
 put through the incision into your artery. The radiologist watches the catheter  s movemen
t on a screen.
  X-ray dye is injected through the catheter into your artery. This helps to see the arter
y more clearly on the X-rays. The radiologist uses these images as a guide. He or she moves 
the catheter to the narrowed or blocked part of your artery.
  When the catheter reaches the narrowed or blocked area, the radiologist inflates a speci
al balloon attached to the catheter. This is called angioplasty. Inflating the balloon widen
s the passage through the artery.
  Sometimes the artery won't stay open after the angioplasty. In this case, a stent is nee
ded. A catheter with a stent attached is threaded through the artery. When the stent is in t
he right place, it is opened.
  When the procedure is done, all catheters and balloons are removed. The stent stays in p
lace. Pressure is put on the insertion site for 15 minutes to stop bleeding.
 After your procedure
  Your healthcare provider will tell you how long to lie down and keep the insertion site 
still.
  You may stay in the hospital for a few hours or overnight.
 
  Drink plenty of fluids to help flush the X-ray dye from your body.
  After you go home, care for the insertion site as directed.
  You may need to take aspirin or a blood-thinning medicine (anticoagulant) after the proc
edure. This is to help prevent blood clots in the stent. Talk with your healthcare provider 
about this.
 Possible risks and complications
 All procedures have some risk. Possible risks of peripheral arterial angioplasty and stent 
include:
  Bleeding or infection at the catheter insertion site
  Damage to the artery, including worsening of the blockage
  Problems because of X-ray dye, these include allergic reaction or kidney damage
  Artery becomes blocked again (restenosis), which often happens within 6 to 18 months
  Low-level exposure to X-ray radiation, which is considered a safe level 
 Date Last Reviewed: 2017-2018 The "[employer]. [address] [place] [address], [place] [id number]. All righ
ts reserved. This information is not intended as a substitute for professional medical care.
 Always follow your healthcare professional's instructions.
 
 I certify that Cherie Villalobos  is under my care and that I had a face to face encounter on 
19  that meets the physician face to face encounter requirements.  I certify that based
 on my findings , the patient is in need of intermittent skilled services and a plan for fur
nishing these services to include, but not limited to the services listed in the questions b
elow:  
 Primary diagnosis for home health: PVD: wound care/ foot infection
 Additional diagnosis: 
 Services needed: wound care for right and left foot
 Patient is homebound because he/she cannot leave home without assistance of another individ
ual and leaving the home requires a considerable and taxing effort. Patient needs the assist
ance of another individual to leave home because:  Limited mobiltiy, fall risk, pain with mo
[facility]
 Physician assuming care for Home Health services: Ivy Couch
 
 in this encounter
 
 Medications at Time of Discharge
 
 
+----------------------+----------------------+----------+---------+----------+-----------+
| Medication           | Sig.                 | Disp.    | Refills | Start    | End Date  |
|                      |                      |          |         | Date     |           |
+----------------------+----------------------+----------+---------+----------+-----------+
|   albuterol          | Inhale 2 puffs into  |          |         |          |           |
| (PROVENTIL           | the lungs every 4    |          |         |          |           |
| HFA;VENTOLIN HFA)    | (four) hours as      |          |         |          |           |
| 108 (90 Base)        | needed for Wheezing. |          |         |          |           |
| MCG/ACT              |                      |          |         |          |           |
| inhalerIndications:  |                      |          |         |          |           |
| states uses 2x       |                      |          |         |          |           |
| monthly average      |                      |          |         |          |           |
+----------------------+----------------------+----------+---------+----------+-----------+
|   apixaban (ELIQUIS) | Take 1 tablet by     |   60     | 0       | 20 |           |
|  2.5 MG tablet       | mouth 2 (two) times  | tablet   |         | 18       |           |
|                      | daily.               |          |         |          |           |
+----------------------+----------------------+----------+---------+----------+-----------+
|   carvedilol (COREG) | Take 1 tablet by     |   60     | 0       | 20 |  |
|  12.5 MG tablet      | mouth 2 (two) times  | tablet   |         | 19       | 0         |
|                      | daily with meals.    |          |         |          |           |
+----------------------+----------------------+----------+---------+----------+-----------+
|   esomeprazole       | Take 1 capsule by    |          |         |          |           |
| (NEXIUM) 40 MG       | mouth every day      |          |         |          |           |
 
| capsule              |                      |          |         |          |           |
+----------------------+----------------------+----------+---------+----------+-----------+
|                      | Take 1 tablet by     |   30     | 0       | 20 |           |
| HYDROcodone-acetamin | mouth every 4 (four) | tablet   |         | 19       |           |
| ophen (NORCO) 5-325  |  hours as needed.    |          |         |          |           |
| MG per tablet        |                      |          |         |          |           |
+----------------------+----------------------+----------+---------+----------+-----------+
|   insulin aspart     | Inject  into the     |          |         |          |           |
| (NOVOLOG) 100        | skin 3 (three) times |          |         |          |           |
| UNIT/ML injection    |  daily before meals. |          |         |          |           |
|                      |  Sliding scale       |          |         |          |           |
+----------------------+----------------------+----------+---------+----------+-----------+
|   insulin degludec   | Inject 21 units      |          |         |          |           |
| (TRESIBA) 100        | every night          |          |         |          |           |
| UNIT/ML injection    |                      |          |         |          |           |
+----------------------+----------------------+----------+---------+----------+-----------+
|   isosorbide         | Take 1 tablet by     |   30     | 1       | 20 |  |
| mononitrate (IMDUR)  | mouth daily.         | tablet   |         | 18       | 9         |
| 60 MG 24 hr tablet   |                      |          |         |          |           |
+----------------------+----------------------+----------+---------+----------+-----------+
|   losartan (COZAAR)  | Take 100 mg by mouth |          |         | 20 |           |
| 100 MG tablet        |  daily.              |          |         | 19       |           |
+----------------------+----------------------+----------+---------+----------+-----------+
|   nitroGLYCERIN      | Place 1 tablet under |   30     | 0       | 20 |  |
| (NITROSTAT) 0.4 MG   |  the tongue every 5  | tablet   |         | 19       | 0         |
| SL tablet            | (five) minutes as    |          |         |          |           |
|                      | needed for Chest     |          |         |          |           |
|                      | pain.                |          |         |          |           |
+----------------------+----------------------+----------+---------+----------+-----------+
|   nortriptyline      | Take 25-75 mg by     |          |         |          |           |
| (PAMELOR) 25 MG      | mouth See Admin      |          |         |          |           |
| capsule              | Instructions. Takes  |          |         |          |           |
|                      | 25 mg by mouth every |          |         |          |           |
|                      |  morning and 75 mg   |          |         |          |           |
|                      | every night          |          |         |          |           |
+----------------------+----------------------+----------+---------+----------+-----------+
|   ondansetron        | Take 4 mg by mouth   |          |         | 20 |           |
| (ZOFRAN-ODT) 4 MG    | every 8 (eight)      |          |         | 18       |           |
| disintegrating       | hours as needed.     |          |         |          |           |
| tablet               |                      |          |         |          |           |
+----------------------+----------------------+----------+---------+----------+-----------+
|   oxybutynin         | Take 7.5 mg by mouth |          |         |          |           |
| (DITROPAN) 5 MG      |  2 (two) times       |          |         |          |           |
| tablet               | daily.               |          |         |          |           |
+----------------------+----------------------+----------+---------+----------+-----------+
|   prochlorperazine 5 | TAKE ONE TABLET BY   |   60     | 11      | 20 |           |
|  MG tablet           | MOUTH TWICE DAILY AS | tablet   |         | 19       |           |
|                      |  NEEDED FOR NAUSEA   |          |         |          |           |
+----------------------+----------------------+----------+---------+----------+-----------+
|   rOPINIRole         | Take 0.75 mg by      |          |         |          |           |
| (REQUIP) 0.25 MG     | mouth nightly.       |          |         |          |           |
| tablet               |                      |          |         |          |           |
+----------------------+----------------------+----------+---------+----------+-----------+
|   TRINTELLIX 20 MG   | Take 20 mg by mouth  |          |         | 20 |           |
| TABS                 | every evening.       |          |         | 19       |           |
+----------------------+----------------------+----------+---------+----------+-----------+
|   cefTAZidime        | Inject 2 g into the  |   1 each |         | 20 |  |
| (FORTAZ) 2 g         | muscle After         |          |         | 19       | 9         |
| injection            | Hemodialysis (see    |          |         |          |           |
|                      | admin instructions)  |          |         |          |           |
 
|                      | for 7 days.          |          |         |          |           |
+----------------------+----------------------+----------+---------+----------+-----------+
|   vancomycin         | Inject 750 mg into   |          | 0       | 20 |  |
| (VANCOCIN) IVPB      | the vein After       |          |         | 19       | 9         |
|                      | Hemodialysis (see    |          |         |          |           |
|                      | admin instructions)  |          |         |          |           |
|                      | for 7 days. Dilute   |          |         |          |           |
|                      | in appropriate       |          |         |          |           |
|                      | volume of IV fluid   |          |         |          |           |
|                      | and give by slow IV  |          |         |          |           |
|                      | infusion.            |          |         |          |           |
+----------------------+----------------------+----------+---------+----------+-----------+
|   albuterol          | Ventolin HFA 90      |          |         |          |  |
| (VENTOLIN HFA) 108   | mcg/actuation        |          |         |          | 9         |
| (90 Base) MCG/ACT    | aerosol inhaler      |          |         |          |           |
| inhaler              | Inhale 2 puffs every |          |         |          |           |
|                      |  4 hours by          |          |         |          |           |
|                      | inhalation route as  |          |         |          |           |
|                      | needed.              |          |         |          |           |
+----------------------+----------------------+----------+---------+----------+-----------+
|   atorvastatin       | Take 1 tablet by     |   30     | 0       | 20 |  |
| (LIPITOR) 40 MG      | mouth nightly.       | tablet   |         | 19       | 9         |
| tablet               |                      |          |         |          |           |
+----------------------+----------------------+----------+---------+----------+-----------+
|   bisacodyl          | Place 10 mg          |          |         |          |  |
| (DULCOLAX) 10 MG     | rectally.            |          |         |          | 9         |
| suppository          |                      |          |         |          |           |
+----------------------+----------------------+----------+---------+----------+-----------+
|   calcium acetate    | 667 mg 3 (three)     |          |         | 20 |  |
| (PHOSLO) 667 MG      | times daily with     |          |         | 16       | 9         |
| capsule              | meals.               |          |         |          |           |
+----------------------+----------------------+----------+---------+----------+-----------+
|   Cholecalciferol    | Take 5,000 capsules  |          |         |          |  |
| (VITAMIN D3) 5000    | by mouth every other |          |         |          | 9         |
| UNITS CAPS           |  day.                |          |         |          |           |
+----------------------+----------------------+----------+---------+----------+-----------+
|   clopidogrel        | Take 1 tablet by     |   30     | 11      | 20 |  |
| (PLAVIX) 75 MG       | mouth daily.         | tablet   |         | 18       | 9         |
| tablet               |                      |          |         |          |           |
+----------------------+----------------------+----------+---------+----------+-----------+
|   loperamide         | 2 mg as needed.      |          |         |          |  |
| (IMODIUM) 2 MG       |                      |          |         |          | 9         |
| capsule              |                      |          |         |          |           |
+----------------------+----------------------+----------+---------+----------+-----------+
|   LORazepam (ATIVAN) | Take 1 tablet by     |   30     | 0       | 20 | 02/15/201 |
|  1 MG tablet         | mouth every 8        | tablet   |         | 19       | 9         |
|                      | (eight) hours as     |          |         |          |           |
|                      | needed for Anxiety.  |          |         |          |           |
+----------------------+----------------------+----------+---------+----------+-----------+
|   Magnesium          | Take 1 tablet by     |          |         |          |  |
| Cl-Calcium Carbonate | mouth every other    |          |         |          | 9         |
|  (SLOW-MAG) 71.5-119 | day.                 |          |         |          |           |
|  MG TBEC             |                      |          |         |          |           |
+----------------------+----------------------+----------+---------+----------+-----------+
|   ranitidine         | ranitidine 150 mg    |          |         |          |  |
| (ZANTAC) 150 MG      | tablet Take 1 tablet |          |         |          | 9         |
| tablet               |  twice a day by oral |          |         |          |           |
|                      |  route.              |          |         |          |           |
+----------------------+----------------------+----------+---------+----------+-----------+
|   Vortioxetine HBr   | 10 mg nightly.       |          |         |          |  |
 
| 10 MG TABS           |                      |          |         |          | 9         |
+----------------------+----------------------+----------+---------+----------+-----------+
 as of this encounter
 
 Progress Notes
 Andrés Elliott MD - 2019  7:16 AM PSTFormatting of this note may be different from the 
original.
 
 
 CONSULT NOTE
 2019
 7:16 AM
 
 PRIMARY PHYSICIAN
 Ivy Couch
 
 CONSULT REQUEST FROM
 Moiz Josue MD
 
 HISTORY OF PRESENT ILLNESS
 The patient is a 68 y.o.-year-old female  with history of ESRD on HD via fistula Tue/Thu/Sa
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
 CABG. Recent LLE angioplasty She had been on antibiotics for L foot first digit infection w
ith possible osteomyelitis since November (Oral levofloxacin and clindamycin then transition
ed to IV cefepime with HD until 18, no improvement noted on antibiotics). She had follo
wed with podiatrist and underwent L TMA on 18, intraoperative findings encountered pur
ulence at first metatarsal.
 
 She was Admitted on 18 to [facility] and had resection of the toe.  discha
rged on 19She has problem with tremors on the hands And on and off confusion. Ct head ne
k[place]all on 18 EEG done show no seizure just diffuse slowing .
 The patient has some nausea and on and off chest pain stable. The patient felt better was
 discharged on 2019 
 
 readmitted on 19 and discharged on 19 due to chest pain and noted to be stable.
 
 
 Wound culture shows skin sherwin.She had 20days off iv vancomycin and 16 days of ceftazidime 
Had 4 days of cefepime.Orignally was to complete to iv vancmycin 500 mg iv and ceftazidime 2
 gm iv after each hemodialysis unitluntil 18 for the infected foot. Tbut was stopped as
 the line of resection was free of infecton . And off antibiotics since 19
 
 
 The patient developed new right foot lesion which occurred between  until 2019. Patient has memory problems does not know what happened but the right foot is swo
llen and red and tender will need to be taken care of as soon as possible cultures will be d
one. We will have MRSA screen CBC CMP ESR CRP blood culture  2
 
 cultures of the right foot done and right ankle done
 
 will have patient go to [facility] ER for evaluation of ischemic right foot versus severe infec
tion of the right fourth toe.
 
 Wound examination right leg with 21 x 5 cm abrasions ankle with open wound big toe right fo
ot with circumference of 10 cm with blister of 0.5 x 0.2 cm with redness of 5 cm. Second toe
 of the right foot with pallor of 4 x 2 cm.Left foot with amputation site healing well 11 x 
1 cm
 
 I have spoken to Dr. Srinivasan Jordan podiatrist will see her when the patient is admitted to 
[facility].
 
 
 Patient will go to the emergency room to see Dr. Saenz whom have coordinated her to be see
n there will have arterial studies of the right leg is concern for ischemic will be on vanco
mycin, ceftazidme
 
 And metronidazole. 
 ADmitted to [facility] on 19
 Due to concern with Right footinfection.
 infection, thus an infectious disease (ID) consult done for further
 evaluation and management.
 Day 3 of vancomycin, metronidazole and ceftazidime
 S/p  Ultrasound guided access of left common femoral artery
 Aortogram SElective catheterization of right common femoral artery with right leg runoff.Ri
ght SFA stenting using 6x80 mm self expanding stent
 Drug eluting balloon angioplasty of right SFA using 4x100 mm Lutonix balloon
 The patient feeling better today 
 Pending culture no growth will complete one more week o
 Ceftazidime and vancomycin 
 wound care with silver sulfadiazine cream. 
 Past Medical History 
 Diagnosis Date 
   Acute MI (Formerly [facility])  
  with stenting x 1 
   Anemia associated with chronic renal failure 2016 
   Atrial fibrillation ([facility]ly [facility])  
   Chronic kidney disease  
   Congestive heart failure ([facility]ly [facility])  
   COPD (chronic obstructive pulmonary disease) (Formerly [facility])  
   COPD (chronic obstructive pulmonary disease) (Formerly [facility]) 2018 
   Coronary artery disease  
  stent placements: 3 total 
   Depression  
   Diabetes other 
  abstracted 
   Diabetes mellitus, type 2 ([facility])  
   ESRD on peritoneal dialysis ([facility]ly [facility])  
   GERD (gastroesophageal reflux disease)  
   Hemodialysis patient (Formerly [facility]) 10/2016 
  Stopped peritoneal and restarted hemodialysis 
   Hemorrhage of gastrointestinal tract, unspecified 2017 
  low GI, rectal bleeding 
   High blood pressure other 
  abstracted 
   High cholesterol other 
  abstracted 
   Hyperlipidemia  
   Hypertension  
   Hyponatremia 2017 
   Myocardial infarction ([facility]) 2017 
   Other chronic pain  
  feet,  
   Pain of left hip joint 2016 
  due to hip fracture and replacement 
   Partial small bowel obstruction ([facility]) 2017 
  resolved 
   Renal failure  
  Stage 5.   Fistula in the JAN - not on dialysis yet. 
   Unspecified visual disturbance  
  glasses 
 
 
 Past Surgical History 
 Procedure Laterality Date 
   AV FISTULA PLACEMENT   
  left upper arm AV fistula - failed 
   AV FISTULA REPAIR N/A 10/25/2016 
  Procedure: AV FISTULA - GRAFT REPAIR/REVISION;  Surgeon: Kirt Mclaughlin MD;  Location: [facility]
IN OR;  Service: Vascular;  Laterality: N/A;  Superficialization and revision of left arm fi
stula 
   CARDIAC CATHETERIZATION  2017 
   CARPAL TUNNEL RELEASE Bilateral other 
  abstracted 
   CATARACT EXTRACTION Bilateral  
   CATHETER REMOVAL N/A 2016 
  Procedure: DIALYSIS CATHETER - REMOVAL;  Surgeon: Kirt Mclaughlin MD;  Location: [facility] OR; 
 Service: Vascular;  Laterality: N/A;  PD cath removal 
   CHOLECYSTECTOMY  other 
  abstracted 
   COLONOSCOPY   
   CORONARY ARTERY BYPASS GRAFT   
   CORONARY STENT PLACEMENT  2017 
  Drug Elutin stents to OM, 1 stent to prox. LAD 
   EYE SURGERY   
   FOOT AMPUTATION Left 2018 
  Procedure: FOREFOOT - AMPUTATION;  Surgeon: Srinivasan Jordan DPM;  Location: [facility] MAIN OR;
  Service: Podiatry;  Laterality: Left; 
   HARDWARE PRESENT   
  stent placements x 3, Left hip replacement, vascular stents 2 in left leg 
   HIP ARTHROPLASTY Left 2016 
  Procedure: HIP - ARTHROPLASTY(ALLISON);  Surgeon: Uriel Roa MD;  Location: [facility] MAIN 
OR;  Service: Orthopedics;  Laterality: Left; 
   HYSTERECTOMY  1998 
  complete 
   PACEMAKER INSERTION   
  [employer] pacemaker/defib 
   PERITONEAL CATHETER INSERTION  8/15/2013 
   PERITONEAL CATHETER INSERTION N/A 2016 
  Procedure: LAPAROSCOPIC - PERITONEAL DIALYSIS CATH INSERTION;  Surgeon: Kirt Mclaughlin MD;  Lo
cation: [facility] MAIN OR;  Service: Vascular;  Laterality: N/A;  Removal of old catheter 
   SHOULDER SURGERY Right  
  frozen shoulder 
   UNLISTED PROCEDURE ARTHROSCOPY   
  total Left hip 
   vascular stents Left 2017 
  leg (2 stents) 
   VASCULAR SURGERY   
 
 Current Facility-Administered Medications 
 Medication Dose Route Frequency Provider Last Rate Last Dose 
   acetaminophen (TYLENOL) tablet 650 mg  650 mg Oral Q6H PRN Luiza Rosales MD     
  Or 
   acetaminophen (TYLENOL) suppository 650 mg  650 mg Rectal Q6H PRN Luiza Rosales MD
     
   albuterol (PROVENTIL HFA;VENTOLIN HFA) inhaler  2 puff Inhalation Q4H PRN Luiza jhaveri MD     
   apixaban (ELIQUIS) tablet 2.5 mg  2.5 mg Oral BID Prudence Nicholson MD-R2   2.5 mg at 2051 
   aspirin EC tablet 81 mg  81 mg Oral Daily with breakfast Kirt Mclaughlin MD   81 mg at  1400 
   atorvastatin (LIPITOR) tablet 40 mg  40 mg Oral Nightly Luiza Rosales MD   40 mg a
t 19 
 
   calcium acetate (PHOSLO) capsule 667 mg  667 mg Oral TID  Luiza Rosales MD   667
 mg at 19 
   carvedilol (COREG) tablet 12.5 mg  12.5 mg Oral BID KAYLEE Rosales MD   12.5 mg 
at 19 
   cefTAZidime (FORTAZ) 2 g in sodium chloride (IV) 0.9 % 50 mL IVPB  2 g Intravenous Afte
r Hemodialysis (see admin instructions) Andrés Elliott MD     
   cholecalciferol (VITAMIN D-3) tablet 5,000 Units  5,000 Units Oral Daily Luiza rivas MD   5,000 Units at 19 1400 
   clopidogrel (PLAVIX) tablet 75 mg  75 mg Oral Daily Kirt Mclaughlin MD   75 mg at 19 1
401 
   dextrose 10 % infusion   Intravenous Continuous PRN Luiza Rosales MD     
   dextrose 50 % solution 12 mL  12 mL Intravenous PRN Luiza Rosales MD     
   dextrose 50 % solution 25 mL  25 mL Intravenous PRN Luiza Rosales MD     
   famotidine (PEPCID) tablet 10 mg  10 mg Oral Daily Luiza Rosales MD   10 mg at  1401 
   fentaNYL (SUBLIMAZE) injection 25 mcg  25 mcg Intravenous Q1H PRN Kirt Mclaughlin MD     
  Or 
   fentaNYL (SUBLIMAZE) injection 50 mcg  50 mcg Intravenous Q1H PRN Kirt Mclaughlin MD     
   glucagon (GLUCAGEN) injection 0.5 mg  0.5 mg Intramuscular PRN Luiza Rosales MD   
  
   glucagon (GLUCAGEN) injection 1 mg  1 mg Intramuscular PRN Luiza Rosales MD     
   HYDROcodone-acetaminophen (NORCO) 5-325 MG per tablet 1 tablet  1 tablet Oral Q4H PRN STEVEN Mclaughlin MD   1 tablet at 19 0626 
  Or 
   HYDROcodone-acetaminophen (NORCO)  MG per tablet 1 tablet  1 tablet Oral Q4H PRN 
Kirt Mclaughlin MD   1 tablet at 19 
   HYDROmorphone (DILAUDID) injection 0.5 mg  0.5 mg Intravenous Q3H PRN Luiza Rosales MD     
  Or 
   HYDROmorphone (DILAUDID) injection 1 mg  1 mg Intravenous Q3H PRN Luiza Rosales MD
     
   insulin glargine (LANTUS) injection 15 Units  15 Units Subcutaneous Nightly Luiza doran MD   15 Units at 19 
   insulin lispro (human) (HUMALOG) injection 0-3 Units  0-3 Units Subcutaneous Nightly 
leonel Rosales MD   1 Units at 19 
   insulin lispro (human) (HUMALOG) injection 0-6 Units  0-6 Units Subcutaneous TID AC She
kelley Rosales MD   1 Units at 19 171 
   isosorbide mononitrate (IMDUR) 24 hr tablet 60 mg  60 mg Oral Daily Luiza Rosales MD   60 mg at 19 1402 
   loperamide (IMODIUM) capsule 2 mg  2 mg Oral 4x Daily PRN Luiza Rosales MD     
   LORazepam (ATIVAN) tablet 1 mg  1 mg Oral Q8H PRN Luiza Rosales MD   1 mg at  0839 
   metroNIDAZOLE (FLAGYL) tablet 500 mg  500 mg Oral 3 times per day Andrés Elliott MD   500 
mg at 19 0557 
   nitroGLYCERIN (NITROSTAT) SL tablet 0.4 mg  0.4 mg Sublingual Q5 Min PRN Luiza rivas MD     
   nortriptyline (PAMELOR) capsule 25 mg  25 mg Oral QAM Kirt Mclaughlin MD   Stopped at  1007 
  And 
   nortriptyline (PAMELOR) capsule 75 mg  75 mg Oral Nightly Kirt Mclaughlin MD   75 mg at  
   ondansetron (ZOFRAN-ODT) disintegrating tablet 4 mg  4 mg Oral Q6H PRN Kirt Mclaughlin MD   
  
  Or 
   ondansetron (ZOFRAN) injection 4 mg  4 mg Intravenous Q6H PRN Kirt Mclaughlin MD   4 mg at 0
19 223 
   oxybutynin (DITROPAN) tablet 2.5 mg  2.5 mg Oral BID Luiza Rosales MD   2.5 mg at 
19 
   pantoprazole (PROTONIX) EC tablet 40 mg  40 mg Oral QAM AC Kirt Mclaughlin MD   40 mg at  0557 
 
   rOPINIRole (REQUIP) tablet 0.75 mg  0.75 mg Oral Nightly Luiza Rosales MD   0.75 m
g at 19 
   sodium chloride (PF) 0.9 % flush 10 mL  10 mL Intravenous Q8H Luiza Rosales MD   1
0 mL at 19 1130 
   vancomycin (VANCOCIN) 500 mg in sodium chloride (IV) 0.9 % 100 mL IVPB  500 mg Intraven
ous See Admin Instructions Beatriz Luciano, [facility]     
   Vortioxetine HBr TABS 10 mg  10 mg Oral Nightly Luiza Rosales MD   Stopped at 01/2
4/19 2200 
   zolpidem (AMBIEN) tablet 5 mg  5 mg Oral Nightly PRN Kirt Mclaughlin MD     
 
 Allergies 
 Allergen Reactions 
   Quinapril Hcl Anaphylaxis 
   Digoxin And Related Other (See Comments) 
   toxic 
   Metaxalone Other (See Comments) 
   Morphine Mental Changes 
   Make her crazy/ "funny feeling in my head"
 Has used hydromorphone in-house 
   Pantoprazole Sodium Nausea and Vomiting 
   Penicillins Rash 
   Quinapril Hcl Edema 
   Facial Edema 
   Sudafed [Pseudoephedrine Hcl] Rash 
 
 Social History 
 
 Social History 
   Marital status:  
   Spouse name: N/A 
   Number of children: N/A 
   Years of education: N/A 
 
 Occupational History 
   Not on file. 
 
 Social History Main Topics 
   Smoking status: Former Smoker 
   Packs/day: 1.00 
   Years: 30.00 
   Types: Cigarettes 
   Quit date: 2004 
   Smokeless tobacco: Never Used 
    Comment: quite smoking  
   Alcohol use No 
   Drug use: No 
   Sexual activity: No 
 
 Other Topics Concern 
   Not on file 
 
 Social History Narrative 
  She lives with . 2 kids. Worked in banking 
 
 No smoking. No alcohol intake. Recreational Drug Use
 No Travel
 NO Pets
 Immunization History 
 Administered Date(s) Administered 
   Hepatitis B Adult 2016 
 
   Influenza, Trivalent W/Preservative 2016 
   Pneumococcal Polysaccharide 23-valent 2012 
  
 
 Family History 
 Problem Relation Age of Onset 
   High cholesterol Father  
   Hypertension Father  
   Diabetes Paternal Grandmother  
   Malig hypertherm Neg Hx  
   Kidney disease Neg Hx  
 
 REVIEW OF SYSTEMS
 
 General: The patient noted to have no problem of fever,no weight loss
  and no chills.
 
 Neurologic: Denies any headache, any lightheadedness. No loss of
 
 consciousness or seizure.
 
 Cardiac: Denies Chest Pain, Palpitation, Dyspnea on Exertion
 
 Pulmonary: Denies cough, wheezing and hemoptysis
 
 GASTROINTESTINAL: Denies nausea vomiting, and diarrhea.
 GENITOURINARY: Noted no dysuria, urgency, or frequency.
 Hematological : Denies any ecchymosis, bleeding or hematuria
 
 Endocrine: Denies any polyphagia, polyuria or hot and cold intolerance
  Musculoskeletal: no new joint pain and swelling. 
 Skin : Denies any new rashes or cutaneous changes.
 
 Rest of the review of systems is unremarkable.
 
 PHYSICAL EXAMINATION
 
 VITAL SIGNS 
 Wt Readings from Last 3 Encounters: 
 19 65.5 kg (144 lb 6.4 oz) 
 19 65.5 kg (144 lb 6.4 oz) 
 18 68.7 kg (151 lb 7.3 oz) 
 
 Temp Readings from Last 3 Encounters: 
 19 98.2 F (36.8 C) (Oral) 
 19 97.7 F (36.5 C) (Axillary) 
 19 98.3 F (36.8 C) (Oral) 
 
 BP Readings from Last 3 Encounters: 
 19 105/58 
 19 108/56 
 01/10/19 115/54 
 
 Pulse Readings from Last 3 Encounters: 
 19 56 
 19 68 
 01/10/19 59 
 
 Head:  Normocephalic atraumatic
 
 
 HEENT: Pink palpebral conjunctivae. Anicteric sclerae. No
 tonsillopharyngeal congestion.
 
 Neck : Noted no  Cervical Lymphadenopathy
 
 CHEST:Clear Breath Sounds Bilateral 
 
 HEART: Regular rate and rhythm. No murmurs thrills or rubs
 
 ABDOMEN: Soft, flat. No tenderness. No hepatosplenomegaly.
 
 GROIN AREA: Negative  for intertrigo.
 
 EXTREMITIES: upper extremity no edema
 Left foot with dressing
 Right foot with wounds less pale and red.
  
 
 NEUROLOGIC: No localizing signs.
 
 Skin : with  ecchymosis. 
 
 LABORATORY DATA
 
 Lab Results 
 Component Value Date 
  WBC 3.90 2019 
  HGB 9.8 (L) 2019 
  HCT 30.1 (L) 2019 
   (L) 2019 
  CHOL 101 2017 
  TRIG 132 2017 
  HDL 34 (L) 2017 
  ALT 21 2019 
  AST 24 2019 
   2019 
  K 3.9 2019 
  CL 97 (L) 2019 
  CREATININE 4.8 (H) 2019 
  BUN 13 2019 
  CO2 31 2019 
  TSH 1.14 2017 
  INR 1.0 2018 
  GLUF 169 (H) 2019 
  HGBA1C 7.5 (H) 2018 
 
 Hepatic Function Panel:  
 Lab Results 
 Component Value Date 
  PROT 6.2 (L) 2019 
  ALB 2.7 (L) 2019 
  BILITOT 0.4 2019 
  BILIDIR 0.1 2017 
   2019 
  AST 24 2019 
  ALT 21 2019 
  
 
 Lipase: 
 Lab Results 
 
 Component Value Date 
  LIPASE 332 2017 
  
 U/A:  
 Lab Results 
 Component Value Date 
  COLORU YELLOW 2011 
  CLARITYU Slightly Cloudy 10/16/2018 
  MEROPENEM 1.009 2013 
  LEUKOCYTESUR  10/16/2018 
    Comment: 
    small 
  NITRITE Negative 10/16/2018 
  UROBILINOGEN Normal 10/16/2018 
  UPRO  10/16/2018 
    Comment: 
    100 
  UPRO 100 2013 
  PHUR 8 10/16/2018 
  BLOODU Negative 10/16/2018 
  KETONES negative 10/16/2018 
  BILIRUBINUR Negative 10/16/2018 
  GLUCOSEU  10/16/2018 
    Comment: 
    moderate 
  
 DIAGNOSTIC IMAGING
 
 CAT scan 
 
 Ultrasound
   
 X-ray Foot Left
 
 Result Date: 2018
 Amputation of the left forefoot at the level of the proximal metatarsals. Surgical cutaneou
s staples are present. No radiographic evidence for osteomyelitis. Normal alignment of the h
indfoot. Mild degeneration of the midfoot. Electronically signed by Oleksandr Lemus MD on 2018 10:12 AM
 
 Ct Head Without Contrast
 
 Result Date: 2018
 1.  No acute intracranial pathology. Electronically signed by Steven Samano MD on  5:28 PM
 
 X-ray Chest 1 View
 
 Result Date: 2019
 1.  Small loculated pleural fluid collection seen overlying the lateral left heart border i
n the lower left chest.  There may also be a small pleural effusion at the right lung base w
hich is layering posteriorly. 2.  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 Electronically s
igned by Eugenio Hoffman DO on 2019 4:59 PM
 
 Us Lower Extremty  Arterial Right
 
 Result Date: 2019
 1. Right leg shows biphasic inflow with a greater than 50% stenosis at the origin of the dobbins
perficial femoral artery (137-309).  Biphasic outflow. 2. Two vessel runoff to the right jeanne
t. Signed by: Eugenio Hoffman Sign Date/Time: 2019 3:11 PM
 
 
 Cherie Villalobos is a 68 y.o. female with the following Problems 
 Patient Active Problem List 
 Diagnosis 
   Proteinuria 
   Vitamin D deficiency 
   Secondary hyperparathyroidism (HCC) 
   Recurrent UTI 
   Coronary artery disease involving native coronary artery of native heart without angina
 pectoris 
   Depression 
   ESRD (end stage renal disease) (HCC) 
   Type 2 diabetes mellitus with chronic kidney disease on chronic dialysis, with long-ter
m current use of insulin (HCC) 
   Anemia in ESRD (end-stage renal disease) (HCC) 
   Hypertension, essential 
   Dyslipidemia 
   Gastroesophageal reflux disease without esophagitis 
   Diabetic foot ulcer (HCC) 
   Loss of weight 
   Dysphagia, unspecified 
   Foot ulcer due to DM  (HCC) 
   Severe protein-calorie malnutrition (HCC) 
   Osteomyelitis of left foot (HCC) 
   Pleural effusion 
   Prolonged Q-T interval on ECG 
   Chronic systolic congestive heart failure (HCC) 
   Chronic diarrhea 
   Chronic anticoagulation 
   Plantar ulcer (HCC) 
   Cardiomyopathy (HCC) 
   COPD (chronic obstructive pulmonary disease) (HCC) 
   ICD (implantable cardioverter-defibrillator) in place 
   Implantable defibrillator reprogramming/check 
   Mixed hyperlipidemia 
   Moderate protein-calorie malnutrition (HCC) 
   Paroxysmal atrial fibrillation (HCC) 
   S/P CABG x 2 
   S/P mitral valve repair 
   Steroid-induced hyperglycemia 
   Stress hyperglycemia 
   Chronic multifocal osteomyelitis of left foot (HCC) 
   PVD (peripheral vascular disease) (HCC) 
   CAD (coronary artery disease) 
   Chest pain 
 
 Plan:
 
 Silver sulfadiazene cream 2x day on the wound. 
 Follow up  Blood culture 2x on different site.
 Follow up  Gram Stain and Culture
 Continue with iv ceftazidime, vancomycin for one week 
 Follow up in one week.
 Discussed with Dr. Josue hospitalist  who agreed and will proceed
 As plan.
 Thank you very much for the consult.
 ______________________
 
 ______________________
 MD Jayson FRANKLIN Christopher D, [facility] - 2019 11:00 AM PSTFormatting of this note may be different
 from the original.
 Vancomycin Monitoring
 
 S:  Pharmacy to dose vancomycin per protocol.  Diagnosis:  Cellulitis.
 O: 
 Lab Results 
 Component Value Date/Time 
  CREATININE 6.8 (H) 2019 04:53 AM 
  WBC 3.39 (L) 2019 04:53 AM 
 
  Wt= 65.5 kg, CrCl= HD T,Th,S. Tmax afeb,
  Cultures= Bloodx2 pending.  Other Abx= ceftazidime, metronidazole. 
 A:  Trough goal:  10- 20 ug/mL
 P: Continue vancomycin per protocol. No levels needed at this time. Will continue to monito
r. 
 
 Pharmacist: Prudence Alcantar MD-R2 - 2019  6:55 AM PSTFormatting of this note may be different fr
om the original.
 [facility]
 Service: Hospitalist
 Resident Progress Note
 
 Hospital Day:  LOS: 1 day 
 Consultants: Treatment Team: 
 Consulting Physician: Srinivasan Jordan DPM
 Consulting Physician: Kirt Mclaughlin MD
 Consulting Physician: Ethan Awad MD
 Admitting Provider: Luiza Rosales MD
 SUBJECTIVE
 
      Patient Summary:  Per Dr. Rosales's H and P from 2019: '67 y/o F with h/o ESRD
 on HD T,TH,Sat (Dr. Asher), DM2, PAD s/p angioplasty of LLE and transmetatarsal amputation 
of left foot 18 by Dr. Jordan due to osteomyelitis, CAD, s/p MI, CABG, AVR , HFrE
F with last EF 20 to 25%, s/p AICD, AF on chronic anticoagulation who was sent to ED by Dr. Elliott for evaluation of right LE.  Patient reports that at HD yesterday it was noted that her
 right toes were red and dusky.  She went to see Dr. Elliott this morning and he was concerned 
that right toes could be ischemic or infected and referred to ED. '
 Vascular surgery was consulted from the emergency department, they did a selective catheter
ization of the left common femoral artery and placed a stent. The patient tolerated the proc
edure well.
      Events Overnight:  
  - No acute overnight events. Afebrile, vital signs stable. The patient reports her pain is
 markedly improved, she states the color is also better to her foot. She has had no other pa
in, no shortness of breath, no nausea or vomiting. Her last bowel movement was this morning.
 Medications: Fentanyl, Norco, Ativan, Versed  2
 
 Scheduled Medications   aspirin  81 mg Oral Daily with breakfast 
   atorvastatin  40 mg Oral Nightly 
   calcium acetate  667 mg Oral TID WC 
   carvedilol  12.5 mg Oral BID WC 
   cefTAZidime  2 g Intravenous After Hemodialysis (see admin instructions) 
   cholecalciferol  5,000 Units Oral Daily 
   clopidogrel  75 mg Oral Daily 
   famotidine  10 mg Oral Daily 
   insulin glargine  15 Units Subcutaneous Nightly 
   insulin lispro (human)  0-3 Units Subcutaneous Nightly 
   insulin lispro (human)  0-6 Units Subcutaneous TID AC 
 
   isosorbide mononitrate  60 mg Oral Daily 
   metroNIDAZOLE  500 mg Oral 3 times per day 
   nortriptyline  25 mg Oral QAM 
  And 
   nortriptyline  75 mg Oral Nightly 
   oxybutynin  2.5 mg Oral BID 
   pantoprazole  40 mg Oral QAM AC 
   rOPINIRole  0.75 mg Oral Nightly 
   sodium chloride (PF)  10 mL Intravenous Q8H 
   vancomycin  500 mg Intravenous See Admin Instructions 
   Vortioxetine HBr  10 mg Oral Nightly 
 
 Continuous Infusions 
   dextrose   
 
 PRN Medications
 acetaminophen **OR** acetaminophen, albuterol, dextrose, dextrose, dextrose, fentaNYL **OR*
* fentaNYL, glucagon, glucagon, HYDROcodone-acetaminophen **OR** HYDROcodone-acetaminophen, 
HYDROmorphone **OR** HYDROmorphone, loperamide, LORazepam, nitroGLYCERIN, ondansetron **OR**
 ondansetron, zolpidem
 
 OBJECTIVE
 Vital Signs:
 /56 (BP Location: Left leg)  | Pulse 77  | Temp 98.5 F (36.9 C) (Oral)  | Resp 16
  | Ht 1.778 m (5' 10")  | Wt 65.5 kg (144 lb 6.4 oz)  | SpO2 98%  | BMI 20.72 kg/m 
 
 Physical Exam
 
 General Appearance: Alert and cooperative, sitting up in bed receiving dialysis and appears
 to be in no acute distress. 
 
 HEENNT: Normocephalic and atraumatic. Hearing grossly intact. No nasal discharge. No trache
al deviation. 
 
 Cardiovascular: Rhythm and rate are regular. 2/6 systolic murmur heard best over the left u
pper sternal border. No gallops or rubs appreciated. Peripheral pulses 1+ on the right. No l
ower extremity edema.
 
 Pulmonary/Chest: Lungs are clear to auscultation bilaterally. Effort is normal. Normal jadon
th sounds. 
 
 Abdominal: Soft, nontender, nondistended. Normoactive bowel sounds. No guarding or rebound 
tenderness. 
 
 Musculoskeletal: Normal range of motion. Normal muscular development. Patient with forefoot
 amputation on the left. Left foot wrapped in dressing, C/D/I. Right foot with erythema over
 the great big toe and cyanosis over the second toe, patient stated this is improved from pr
ior. Her right foot is warm to the touch and has palpable dorsalis pedis pulse.
 
 Neurological: CN II-XII grossly intact. Oriented to person, place, and time. 
 
 Skin: Skin is warm and dry. No rash noted. 
 
 Psychiatric:The patient was able to demonstrate good judgement and reason, without hallucin
ations, abnormal affect or abnormal behaviors during the examination. 
 
 DATA
 
 Recent Labs
 Lab 19
 
 0453 19
 1543 19
 0554 19
 0847 
 WBC 3.39* 5.53 4.08 4.23 
 HGB 9.5* 9.4* 9.7* 8.5* 
 HCT 29.2* 28.4* 29.7* 26.0* 
 * 147* 125* 134* 
 NEUTOPHILPCT 66.95  --   --  74.02 
 MONOPCT 11.96  --   --  7.65 
 
 Recent Labs
 Lab 19
 0453 19
 1543 19
 0554 19
 0847 
  139 138 139 
 K 4.2 4.0 4.4 4.4 
  100 100 99 
 CO2 28 29 28 32 
 BUN 26* 23 15 24 
 CREATININE 6.8* 6.1* 4.9* 6.5* 
 ALB  --  2.7* 2.6* 2.2* 
 PROT  --  6.2* 6.1*  --  
 BILITOT  --  0.4 0.5  --  
 ALT  --    --  
 AST  --  24   --  
 
 Phosphorus:  
 Lab Results 
 Component Value Date 
  PHOS 4.4 2019 
 
 Recent Labs
 Lab 19
 1543 
 CKTOTAL 28* 
 
 Intake/Output Summary (Last 24 hours) at 19 0655
 Last data filed at 19 0341
  Gross per 24 hour 
 Intake              300 ml 
 Output                0 ml 
 Net              300 ml 
 
 Microbiology: Blood cultures pending
 
 Radiology
 Us Lower Extremty  Arterial Right
 
 Result Date: 2019
 1. Right leg shows biphasic inflow with a greater than 50% stenosis at the origin of the dobbins
perficial femoral artery (137-309).  Biphasic outflow. 2. Two vessel runoff to the right jeanne
t. Signed by: Eugenio Hoffman Sign Date/Time: 2019 3:11 PM
 
 PROBLEM LIST
 
 Principal Problem:
   PVD (peripheral vascular disease) ([facility])
 
 Active Problems:
   ESRD (end stage renal disease) ([facility])
   Type 2 diabetes mellitus with chronic kidney disease on chronic dialysis, with long-term 
current use of insulin ([facility])
   Anemia in ESRD (end-stage renal disease) ([facility])
   Hypertension, essential
   Chronic systolic congestive heart failure ([facility])
   COPD (chronic obstructive pulmonary disease) ([facility])
   Paroxysmal atrial fibrillation ([facility])
   CAD (coronary artery disease)
 Resolved Problems:
   * No resolved hospital problems. *
 
 ASSESSMENT & PLAN
 
 Patient Active Hospital Problem List:
  PVD (peripheral vascular disease) ([facility]) (2018)
   Assessment: Sent by ASAF Torrez, to the emergency department. Dr. Mclaughlin was consulted, IR 
angiogram yesterday. She was found to have mesenteric stenosis and PAD. Stent was placed in 
the left common iliac artery. She tolerated the catheterization well, and has improved vascu
lar blood flow to the right foot, although still with some cyanosis of the second toe.
   Plan: 
 Infectious disease consulting, appreciate recommendations
 IV Ceftazidime, vancomycin, metronidazole
 Vascular surgery consulting, appreciate recommendations
 Restarted apixaban (eliquis) today
 Fentanyl, Tylenol, Norco available prn for pain control
 Wound care consulted, appreciate recommendations
 Podiatry has also been consulted
 
  ESRD (end stage renal disease) ([facility]) (2016)
    Anemia in ESRD (end-stage renal disease) ([facility]) (2016)
 Assessment: With dialysis Tuesday, Thursday, Saturday, sees Dr. Asher clinic. She received 
her regular dialysis today without issue.
   Plan: 
 Nephrology consulting, appreciate recommendations.
 Continue home PhosLo, vitamin D
 She receives EPO injections with her dialysis
 
  Type 2 diabetes mellitus with chronic kidney disease on chronic dialysis, with long-term c
urrent use of insulin ([facility]) (2016)
   Assessment: The patient reports she normally takes 21 units of long-acting insulin at nig
ht with an insulin sliding scale at meals. Last hemoglobin A1c 7.5 percent in 2018.
 
   Plan: Lantus 15 units nightly
 Mild insulin sliding scale
 Aspirin, statin
 
  Hypertension, essential (2016)
   Assessment: Patient's blood pressure has been normal, and on the side of low during dialy
sis but with systolic blood pressure greater than 90.
   Plan: 
 Continue home Coreg, Imdur 
 
  Chronic systolic congestive heart failure ([facility]) (2018)
   Assessment: Last echo done 2018 showed severely impaired EF 20-25 percent, mild 
aortic regurgitation, moderate aortic stenosis, mild to moderate mitral regurgitation with m
oderate pulmonary hypertension. 
   Plan:
 Continue home Aspirin, statin, Coreg, Imdur
 
 
  COPD
 Assessment: The patient currently states that this is stable, she is currently satting well
 on room air with no shortness of breath.
 Plan: Albuterol prn
 
  CAD (coronary artery disease) (2018)
   Assessment: She has a history of chest pain with dialysis, however this has not happened 
during this hospitalization. Last catheterization 2018, CABG in 2018. 
   Plan: 
 Continue home Aspirin, statin, Imdur
 
 Continue home for Vortioxetine, nortriptyline, oxybutynin, pantoprazole
 Currently holding losartan
 
 Diet: Diabetic 
 DVT Prophylaxis: Eliquis
 Disposition: Inpatient
 Code Status:  Full Code
 
 Prudence Nicholson MD-R2
 2019
 6:55 AM
 
 
 Associated attestation - Moiz Josue MD - 2019  5:13 PM PSTPatient seen an
d examined along with residents. All labs and notes personally reviewed by me. Patient under
went vascular procedure yesterday with angioplasty and stenting of right SFA. She now has wa
rm right foot indicating resumption of circulation. Patient is on three antibiotics (vanc, F
lagyl and ceftazidime) by Dr. Elliott for right first and second toe cellulitis. No surgical in
tervention needed at this stage for right foot infection. Appreciate help of Dr. Mclaughlin, Dr. Norma clemente and Dr. Jordan. 
 I agree with the progress note of Dr. Nicholson. Abigail Morgan, [facility] - 2019  8:10 PM 
PSTFormatting of this note may be different from the original.
 Clinical Pharmacy Note: Renal Monitoring
 Cherie Villalobos 68 y.o. female
 Ht Readings from Last 1 Encounters: 
 19 1.778 m (5' 10") 
   
 Wt Readings from Last 1 Encounters: 
 19 65.5 kg (144 lb 6.4 oz) 
  
 Serum creatinine: 6.1 mg/dL (H) 19 1543
 Estimated creatinine clearance: 9 mL/min (A)
 Patient has h/o ESRD on HD qTTS
 
 Pharmacy dosing for renal function per Dr. Luiza Rosales.
 
 Currently, there are no medications needing to be adjusted.
 
 Pharmacy will continue to monitor and make changes as needed per renal function. 
 
 Abigail Morgan, [facility]
 PGY-1 Pharmacy Resident
 in this encounter
 
 Plan of Treatment
 
 
+--------+---------+-----------+----------------------+-------------+
 
| Date   | Type    | Specialty | Care Team            | Description |
+--------+---------+-----------+----------------------+-------------+
| 04/10/ | Office  | Podiatry  |   Srinivasan Jordan,  |             |
| 2019   | Visit   |           | DPM  [address] [facility], |             |
|        |         |           |  [address]  [place],  |             |
|        |         |           | [place] [address]             |             |
|        |         |           | [phone]         |             |
|        |         |           | [phone] (Fax)   |             |
+--------+---------+-----------+----------------------+-------------+
 as of this encounter
 
 Procedures
 
 
+----------------------+--------+-------------+----------------------+----------------------
+
| Procedure Name       | Priori | Date/Time   | Associated Diagnosis | Comments             
|
|                      | ty     |             |                      |                      
|
+----------------------+--------+-------------+----------------------+----------------------
+
| POCT GLUCOSE         | Routin | 2019  |                      |   Results for this   
|
|                      | e      | 12:10 PM    |                      | procedure are in the 
|
|                      |        | PST         |                      |  results section.    
|
+----------------------+--------+-------------+----------------------+----------------------
+
| POCT GLUCOSE         | Routin | 2019  |                      |   Results for this   
|
|                      | e      |  5:55 AM    |                      | procedure are in the 
|
|                      |        | PST         |                      |  results section.    
|
+----------------------+--------+-------------+----------------------+----------------------
+
| CBC W/AUTO DIFF      | Routin | 2019  |                      |   Results for this   
|
| (REFLEX TO MANUAL)   | e      |  5:38 AM    |                      | procedure are in the 
|
|                      |        | PST         |                      |  results section.    
|
+----------------------+--------+-------------+----------------------+----------------------
+
| PHOSPHOROUS          | Routin | 2019  |                      |   Results for this   
|
|                      | e      |  5:38 AM    |                      | procedure are in the 
|
|                      |        | PST         |                      |  results section.    
|
+----------------------+--------+-------------+----------------------+----------------------
+
| MAGNESIUM            | Routin | 2019  |                      |   Results for this   
|
|                      | e      |  5:38 AM    |                      | procedure are in the 
|
|                      |        | PST         |                      |  results section.    
|
 
+----------------------+--------+-------------+----------------------+----------------------
+
| BASIC METABOLIC      | Routin | 2019  |                      |   Results for this   
|
| PANEL                | e      |  5:38 AM    |                      | procedure are in the 
|
|                      |        | PST         |                      |  results section.    
|
+----------------------+--------+-------------+----------------------+----------------------
+
| POCT GLUCOSE         | Routin | 2019  |                      |   Results for this   
|
|                      | e      |  9:29 PM    |                      | procedure are in the 
|
|                      |        | PST         |                      |  results section.    
|
+----------------------+--------+-------------+----------------------+----------------------
+
| POCT GLUCOSE         | Routin | 2019  |                      |   Results for this   
|
|                      | e      |  4:06 PM    |                      | procedure are in the 
|
|                      |        | PST         |                      |  results section.    
|
+----------------------+--------+-------------+----------------------+----------------------
+
| POCT GLUCOSE         | Routin | 2019  |                      |   Results for this   
|
|                      | e      | 12:13 PM    |                      | procedure are in the 
|
|                      |        | PST         |                      |  results section.    
|
+----------------------+--------+-------------+----------------------+----------------------
+
| EKG STANDARD 12 LEAD | Routin | 2019  |                      |   Results for this   
|
|                      | e      |  6:18 AM    |                      | procedure are in the 
|
|                      |        | PST         |                      |  results section.    
|
+----------------------+--------+-------------+----------------------+----------------------
+
| POCT GLUCOSE         | Routin | 2019  |                      |   Results for this   
|
|                      | e      |  5:21 AM    |                      | procedure are in the 
|
|                      |        | PST         |                      |  results section.    
|
+----------------------+--------+-------------+----------------------+----------------------
+
| CBC W/AUTO DIFF      | Routin | 2019  |                      |   Results for this   
|
| (REFLEX TO MANUAL)   | e - AM |  4:53 AM    |                      | procedure are in the 
|
|                      |        | PST         |                      |  results section.    
|
+----------------------+--------+-------------+----------------------+----------------------
+
| BASIC METABOLIC      | Routin | 2019  |                      |   Results for this   
|
 
| PANEL                | e - AM |  4:53 AM    |                      | procedure are in the 
|
|                      |        | PST         |                      |  results section.    
|
+----------------------+--------+-------------+----------------------+----------------------
+
| POCT GLUCOSE         | Routin | 2019  |                      |   Results for this   
|
|                      | e      |  9:00 PM    |                      | procedure are in the 
|
|                      |        | PST         |                      |  results section.    
|
+----------------------+--------+-------------+----------------------+----------------------
+
| IR STENT FEMORAL     | Routin | 2019  |                      |   Results for this   
|
| POPLITEAL            | e      |  6:45 PM    |                      | procedure are in the 
|
|                      |        | PST         |                      |  results section.    
|
+----------------------+--------+-------------+----------------------+----------------------
+
| IR ANGIOGRAM         | Routin | 2019  |                      |   Results for this   
|
| EXTREMITY RIGHT      | e      |  6:45 PM    |                      | procedure are in the 
|
|                      |        | PST         |                      |  results section.    
|
+----------------------+--------+-------------+----------------------+----------------------
+
| IR AORTAGRAM         | Routin | 2019  |                      |   Results for this   
|
| ABDOMINAL            | e      |  6:45 PM    |                      | procedure are in the 
|
| SERIALOGRAM          |        | PST         |                      |  results section.    
|
+----------------------+--------+-------------+----------------------+----------------------
+
| BLOOD CULTURE, SET 2 | Timed  | 2019  |                      |   Results for this   
|
|                      |        |  6:05 PM    |                      | procedure are in the 
|
|                      |        | PST         |                      |  results section.    
|
+----------------------+--------+-------------+----------------------+----------------------
+
| IR GUIDANCE VASCULAR | Routin | 2019  |                      |   Results for this   
|
|  ACCESS US           | e      |  5:40 PM    |                      | procedure are in the 
|
|                      |        | PST         |                      |  results section.    
|
+----------------------+--------+-------------+----------------------+----------------------
+
| BLOOD CULTURE, SET 1 | Timed  | 2019  |                      |   Results for this   
|
|                      |        |  3:43 PM    |                      | procedure are in the 
|
|                      |        | PST         |                      |  results section.    
|
 
+----------------------+--------+-------------+----------------------+----------------------
+
| SEDIMENTATION RATE,  | STAT   | 2019  |                      |   Results for this   
|
| AUTOMATED            |        |  3:43 PM    |                      | procedure are in the 
|
|                      |        | PST         |                      |  results section.    
|
+----------------------+--------+-------------+----------------------+----------------------
+
| CBC W/MANUAL DIFF    | STAT   | 2019  |                      |   Results for this   
|
|                      |        |  3:43 PM    |                      | procedure are in the 
|
|                      |        | PST         |                      |  results section.    
|
+----------------------+--------+-------------+----------------------+----------------------
+
| C-REACTIVE PROTEIN   | STAT   | 2019  |                      |   Results for this   
|
|                      |        |  3:43 PM    |                      | procedure are in the 
|
|                      |        | PST         |                      |  results section.    
|
+----------------------+--------+-------------+----------------------+----------------------
+
| CK                   | STAT   | 2019  |                      |   Results for this   
|
|                      |        |  3:43 PM    |                      | procedure are in the 
|
|                      |        | PST         |                      |  results section.    
|
+----------------------+--------+-------------+----------------------+----------------------
+
| COMPREHENSIVE        | STAT   | 2019  |                      |   Results for this   
|
| METABOLIC PANEL      |        |  3:43 PM    |                      | procedure are in the 
|
|                      |        | PST         |                      |  results section.    
|
+----------------------+--------+-------------+----------------------+----------------------
+
| US LOWER EXTREMITY   | STAT   | 2019  |                      |   Results for this   
|
| ARTERIAL RIGHT       |        |  2:23 PM    |                      | procedure are in the 
|
|                      |        | PST         |                      |  results section.    
|
+----------------------+--------+-------------+----------------------+----------------------
+
| ED INFORMATION       | Routin | 2019  |                      |   Results for this   
|
| EXCHANGE             | e      |  1:03 PM    |                      | procedure are in the 
|
|                      |        | PST         |                      |  results section.    
|
+----------------------+--------+-------------+----------------------+----------------------
+
 in this encounter
 
 
 Results
 POCT glucose (2019 12:10 PM)
 
+--------------------+--------------------------+---------------+-----------------+
| Component          | Value                    | Ref Range     | Performed At    |
+--------------------+--------------------------+---------------+-----------------+
| GLUCOSE,POC SCREEN | 237 (H)Comment: Testing  | 65 - 99 mg/dL | [facility] |
|                    | [place] [facility] [place]     |               |                 |
|                    | [facility];[place], [place]   |               |                 |
|                    | [id number]                    |               |                 |
+--------------------+--------------------------+---------------+-----------------+
 
 
 
+-------------------+------------------+--------------------+--------------+
| Performing        | Address          | City/State/Zipcode | Phone Number |
| Organization      |                  |                    |              |
+-------------------+------------------+--------------------+--------------+
|   [facility] |   [address] | [place] [address] |              |
+-------------------+------------------+--------------------+--------------+
 POCT glucose (2019  5:55 AM)
 
+--------------------+--------------------------+---------------+-----------------+
| Component          | Value                    | Ref Range     | Performed At    |
+--------------------+--------------------------+---------------+-----------------+
| GLUCOSE,POC SCREEN | 135 (H)Comment: Testing  | 65 - 99 mg/dL | [facility] |
|                    | [place] at [facility] [place]     |               |                 |
|                    | [address];[place]   |               |                 |
|                    | [address]                    |               |                 |
+--------------------+--------------------------+---------------+-----------------+
 
 
 
+-------------------+------------------+--------------------+--------------+
| Performing        | Address          | City/State/Zipcode | Phone Number |
| Organization      |                  |                    |              |
+-------------------+------------------+--------------------+--------------+
|   [facility] |   [address] | [place] [address] |              |
+-------------------+------------------+--------------------+--------------+
 Phosphorus (2019  5:38 AM)
 
+------------+--------------------------+-----------------+--------------+
| Component  | Value                    | Ref Range       | Performed At |
+------------+--------------------------+-----------------+--------------+
| PHOSPHORUS | 3.6Comment: Testing      | 2.3 - 4.8 mg/dL | [facility]   |
|            | performed at [facility], [address] |                 | LABORATORY   |
|            |  [address],        |                 |              |
|            | [place]  [address]     |                 |              |
+------------+--------------------------+-----------------+--------------+
 
 
 
+----------+
| Specimen |
+----------+
| Blood    |
+----------+
 
 
 
 
+---------------+-------------------------+---------------------+----------------+
| Performing    | Address                 | City/State/Zipcode  | Phone Number   |
| Organization  |                         |                     |                |
+---------------+-------------------------+---------------------+----------------+
|   [facility]  |   [address]  | [place] [address] |   [phone] |
| LABORATORY    | [address]                   |                     |                |
+---------------+-------------------------+---------------------+----------------+
 Magnesium (2019  5:38 AM)
 
+-----------+--------------------------+-----------------+--------------+
| Component | Value                    | Ref Range       | Performed At |
+-----------+--------------------------+-----------------+--------------+
| MAGNESIUM | 2.3Comment: Testing      | 1.7 - 2.4 mg/dL | [facility]   |
|           | performed at [facility], [address] |                 | LABORATORY   |
|           |  [facility],        |                 |              |
|           | [place]  [address]     |                 |              |
+-----------+--------------------------+-----------------+--------------+
 
 
 
+----------+
| Specimen |
+----------+
| Blood    |
+----------+
 
 
 
+---------------+-------------------------+---------------------+----------------+
| Performing    | Address                 | City/State/Zipcode  | Phone Number   |
| Organization  |                         |                     |                |
+---------------+-------------------------+---------------------+----------------+
|   [facility]  |   [address]  | [place] [address] |   [phone] |
| LABORATORY    | [address]                   |                     |                |
+---------------+-------------------------+---------------------+----------------+
 Basic metabolic panel (2019  5:38 AM)
 
+----------------+--------------------------+-------------------+--------------+
| Component      | Value                    | Ref Range         | Performed At |
+----------------+--------------------------+-------------------+--------------+
| SODIUM         | 139                      | 135 - 145 mmol/L  | [facility]   |
|                |                          |                   | LABORATORY   |
+----------------+--------------------------+-------------------+--------------+
| POTASSIUM      | 3.9                      | 3.5 - 4.9 mmol/L  | [facility]   |
|                |                          |                   | LABORATORY   |
+----------------+--------------------------+-------------------+--------------+
| CHLORIDE       | 97 (L)                   | 99 - 109 mmol/L   | [facility]   |
|                |                          |                   | LABORATORY   |
+----------------+--------------------------+-------------------+--------------+
| CO2            | 31                       | 23 - 32 mmol/L    | [facility]   |
|                |                          |                   | LABORATORY   |
+----------------+--------------------------+-------------------+--------------+
| ANION GAP AGAP | 15                       | 5 - 20 mmol/L     | [facility]   |
|                |                          |                   | LABORATORY   |
+----------------+--------------------------+-------------------+--------------+
| GLUCOSE        | 169 (H)                  | 65 - 99 mg/dL     | [facility]   |
|                |                          |                   | LABORATORY   |
+----------------+--------------------------+-------------------+--------------+
| BUN            | 13                       | 8 - 25 mg/dL      | [facility]   |
|                |                          |                   | LABORATORY   |
 
+----------------+--------------------------+-------------------+--------------+
| CREATININE     | 4.8 (H)                  | 0.50 - 1.00 mg/dL | [facility]   |
|                |                          |                   | LABORATORY   |
+----------------+--------------------------+-------------------+--------------+
| BUN/CREAT      | 3                        |                   | [facility]   |
|                |                          |                   | LABORATORY   |
+----------------+--------------------------+-------------------+--------------+
| CALCIUM        | 9.4                      | 8.5 - 10.5 mg/dL  | [facility]   |
|                |                          |                   | LABORATORY   |
+----------------+--------------------------+-------------------+--------------+
| EGFR           | 9 (L)Comment: GFR <60:   | >60 mL/min/1.73m2 | [facility]   |
|                | CHRONIC KIDNEY DISEASE,  |                   | LABORATORY   |
|                | IF FOUND OVER A 3 MONTH  |                   |              |
|                | PERIOD.GFR <15: KIDNEY   |                   |              |
|                | FAILURE.FOR       |                   |              |
|                | [employer], MULTIPLY THE  |                   |              |
|                | CALCULATED GFR BY        |                   |              |
|                | 1.210.This eGFR is       |                   |              |
|                | calculated using the     |                   |              |
|                | MDRD IDMS traceable      |                   |              |
|                | equation.Testing         |                   |              |
|                | performed at [facility], [address] |                   |              |
|                |  [facility],        |                   |              |
|                | [place]    [address]   |                   |              |
+----------------+--------------------------+-------------------+--------------+
 
 
 
+----------+
| Specimen |
+----------+
| Blood    |
+----------+
 
 
 
+---------------+-------------------------+---------------------+----------------+
| Performing    | Address                 | City/State/Zipcode  | Phone Number   |
| Organization  |                         |                     |                |
+---------------+-------------------------+---------------------+----------------+
|   [facility]  |   [address]  | [place] [address] |   [phone] |
| LABORATORY    | [address]                   |                     |                |
+---------------+-------------------------+---------------------+----------------+
 CBC w/auto diff (reflex to manual) (2019  5:38 AM)
 
+----------------------+--------------------------------------------------------------------
-----+-------------------+--------------+
| Component            | Value                                                              
     | Ref Range         | Performed At |
+----------------------+--------------------------------------------------------------------
-----+-------------------+--------------+
| WBC                  | 3.90                                                               
     | 3.80 - 11.00 K/uL | [facility]   |
|                      |                                                                    
     |                   | LABORATORY   |
+----------------------+--------------------------------------------------------------------
-----+-------------------+--------------+
| RBC                  | 2.87 (L)                                                           
     | 3.70 - 5.10 M/uL  | [facility]   |
|                      |                                                                    
 
     |                   | LABORATORY   |
+----------------------+--------------------------------------------------------------------
-----+-------------------+--------------+
| HGB                  | 9.8 (L)                                                            
     | 11.3 - 15.5 g/dL  | [facility]   |
|                      |                                                                    
     |                   | LABORATORY   |
+----------------------+--------------------------------------------------------------------
-----+-------------------+--------------+
| HCT                  | 30.1 (L)                                                           
     | 34.0 - 46.0 %     | [facility]   |
|                      |                                                                    
     |                   | LABORATORY   |
+----------------------+--------------------------------------------------------------------
-----+-------------------+--------------+
| MCV                  | 105.1 (H)                                                          
     | 80.0 - 100.0 fl   | [facility]   |
|                      |                                                                    
     |                   | LABORATORY   |
+----------------------+--------------------------------------------------------------------
-----+-------------------+--------------+
| MCH                  | 34.3 (H)                                                           
     | 27.0 - 34.0 pg    | [facility]   |
|                      |                                                                    
     |                   | LABORATORY   |
+----------------------+--------------------------------------------------------------------
-----+-------------------+--------------+
| MCHC                 | 32.7                                                               
     | 32.0 - 35.5 g/dL  | [facility]   |
|                      |                                                                    
     |                   | LABORATORY   |
+----------------------+--------------------------------------------------------------------
-----+-------------------+--------------+
| RDW SD               | 61.7 (H)                                                           
     | 37 - 53 fl        | [facility]   |
|                      |                                                                    
     |                   | LABORATORY   |
+----------------------+--------------------------------------------------------------------
-----+-------------------+--------------+
| PLT                  | 137 (L)                                                            
     | 150 - 400 K/uL    | [facility]   |
|                      |                                                                    
     |                   | LABORATORY   |
+----------------------+--------------------------------------------------------------------
-----+-------------------+--------------+
| MPV                  | 9.5                                                                
     | fl                | [facility]   |
|                      |                                                                    
     |                   | LABORATORY   |
+----------------------+--------------------------------------------------------------------
-----+-------------------+--------------+
| DIFF TYPE            | MANUAL                                                             
     |                   | [facility]   |
|                      |                                                                    
     |                   | LABORATORY   |
+----------------------+--------------------------------------------------------------------
-----+-------------------+--------------+
| Neutrophils Manual   | 63                                                                 
     | %                 | [facility]   |
|                      |                                                                    
 
     |                   | LABORATORY   |
+----------------------+--------------------------------------------------------------------
-----+-------------------+--------------+
| Lymphocytes Manual   | 30                                                                 
     | %                 | [facility]   |
|                      |                                                                    
     |                   | LABORATORY   |
+----------------------+--------------------------------------------------------------------
-----+-------------------+--------------+
| Monocytes Manual     | 7                                                                  
     | %                 | [facility]   |
|                      |                                                                    
     |                   | LABORATORY   |
+----------------------+--------------------------------------------------------------------
-----+-------------------+--------------+
| Neutrophils Absolute | 2.46                                                               
     | 1.90 - 7.40 K/uL  | [facility]   |
|                      |                                                                    
     |                   | LABORATORY   |
+----------------------+--------------------------------------------------------------------
-----+-------------------+--------------+
| Lymphocytes Absolute | 1.17                                                               
     | 1.00 - 3.90 K/uL  | [facility]   |
|                      |                                                                    
     |                   | LABORATORY   |
+----------------------+--------------------------------------------------------------------
-----+-------------------+--------------+
| Monocytes Absolute   | 0.27                                                               
     | 0.00 - 0.80 K/uL  | [facility]   |
|                      |                                                                    
     |                   | LABORATORY   |
+----------------------+--------------------------------------------------------------------
-----+-------------------+--------------+
| MORPHOLOGY           | 1+Comment:                                                         
     |                   | [facility]   |
|                      | ANISO1+HYPONORMAL PLT                                              
     |                   | LABORATORY   |
|                      | MORPHTesting performed                                             
     |                   |              |
|                      | at [facility], [address]                                                     
     |                   |              |
|                      | [facility],                                                   
     |                   |              |
|                      | [place]    [address]                                             
     |                   |              |
|                      |Testing performed at [facility], [address] [facility], [place], [place]    [id number] |                   |              |
|                      |                                                                    
     |                   |              |
+----------------------+--------------------------------------------------------------------
-----+-------------------+--------------+
 
 
 
+----------+
| Specimen |
+----------+
| Blood    |
+----------+
 
 
 
 
+---------------+-------------------------+---------------------+----------------+
| Performing    | Address                 | City/State/Zipcode  | Phone Number   |
| Organization  |                         |                     |                |
+---------------+-------------------------+---------------------+----------------+
|   [facility]  |   [address]  | [place] [address] |   [phone] |
| LABORATORY    | [place]                   |                     |                |
+---------------+-------------------------+---------------------+----------------+
 POCT glucose (2019  9:29 PM)
 
+--------------------+--------------------------+---------------+-----------------+
| Component          | Value                    | Ref Range     | Performed At    |
+--------------------+--------------------------+---------------+-----------------+
| GLUCOSE,POC SCREEN | 262 (H)Comment: Testing  | 65 - 99 mg/dL | [facility] |
|                    | performed at [facility] [place]     |               |                 |
|                    | [address];[place]   |               |                 |
|                    | [address]                    |               |                 |
+--------------------+--------------------------+---------------+-----------------+
 
 
 
+-------------------+------------------+--------------------+--------------+
| Performing        | Address          | City/State/Zipcode | Phone Number |
| Organization      |                  |                    |              |
+-------------------+------------------+--------------------+--------------+
|   [facility] |   [address] | [place] [address] |              |
+-------------------+------------------+--------------------+--------------+
 POCT glucose (2019  4:06 PM)
 
+--------------------+--------------------------+---------------+-----------------+
| Component          | Value                    | Ref Range     | Performed At    |
+--------------------+--------------------------+---------------+-----------------+
| GLUCOSE,POC SCREEN | 193 (H)Comment: Testing  | 65 - 99 mg/dL | [facility] |
|                    | performed at [facility] [place]     |               |                 |
|                    | [address];[place]   |               |                 |
|                    | [address]                    |               |                 |
+--------------------+--------------------------+---------------+-----------------+
 
 
 
+-------------------+------------------+--------------------+--------------+
| Performing        | Address          | City/State/Zipcode | Phone Number |
| Organization      |                  |                    |              |
+-------------------+------------------+--------------------+--------------+
|   [facility] |   [address] | [facility] [place] [address] |              |
+-------------------+------------------+--------------------+--------------+
 POCT glucose (2019 12:13 PM)
 
+--------------------+--------------------------+---------------+-----------------+
| Component          | Value                    | Ref Range     | Performed At    |
+--------------------+--------------------------+---------------+-----------------+
| GLUCOSE,POC SCREEN | 145 (H)Comment: Testing  | 65 - 99 mg/dL | [facility] |
|                    | performed at [facility] [place]     |               |                 |
|                    | [address];[place]   |               |                 |
|                    | [address]                    |               |                 |
+--------------------+--------------------------+---------------+-----------------+
 
 
 
 
+-------------------+------------------+--------------------+--------------+
| Performing        | Address          | City/State/Zipcode | Phone Number |
| Organization      |                  |                    |              |
+-------------------+------------------+--------------------+--------------+
|   [facility] |   [address] | [place] [address] |              |
+-------------------+------------------+--------------------+--------------+
 EKG STANDARD 12 LEAD (2019  6:18 AM)
 
+-------------------+--------------------------+-----------+--------------+
| Component         | Value                    | Ref Range | Performed At |
+-------------------+--------------------------+-----------+--------------+
| Ventricular Rate  | 73                       | BPM       | [facility] E[facility]     |
+-------------------+--------------------------+-----------+--------------+
| Atrial Rate       | 73                       | BPM       | [facility]     |
+-------------------+--------------------------+-----------+--------------+
| P-R Interval      | 146                      | ms        | [facility]     |
+-------------------+--------------------------+-----------+--------------+
| QRS Duration      | 128                      | ms        | [facility]     |
+-------------------+--------------------------+-----------+--------------+
| Q-T Interval      | 464                      | ms        | [facility] E[facility]     |
+-------------------+--------------------------+-----------+--------------+
| QTC Calculation   | 511                      | ms        | [facility]     |
| (Bezet)           |                          |           |              |
+-------------------+--------------------------+-----------+--------------+
| Calculated P Axis | 70                       | degrees   | [facility]     |
+-------------------+--------------------------+-----------+--------------+
| Calculated R Axis | -38                      | degrees   | [facility] EKG     |
+-------------------+--------------------------+-----------+--------------+
| Calculated T Axis | 9                        | degrees   | [facility] EKG     |
+-------------------+--------------------------+-----------+--------------+
| Diagnosis         | Normal sinus             |           | [facility] E[facility]     |
|                   | rhythmPossible Left      |           |              |
|                   | atrial enlargementLeft   |           |              |
|                   | axis                     |           |              |
|                   | deviationNon-specific    |           |              |
|                   | intra-ventricular        |           |              |
|                   | conduction blockCannot   |           |              |
|                   | rule out Septal infarct  |           |              |
|                   | , age                    |           |              |
|                   | undeterminedAbnormal     |           |              |
|                   | ECGWhen compared with    |           |              |
|                   | ECG of 2019       |           |              |
|                   | 05:50,Minimal criteria   |           |              |
|                   | for Septal infarct are   |           |              |
|                   | now PresentConfirmed by  |           |              |
|                   | EN FRANK ([id number]) on   |           |              |
|                   | 2019 12:03:10 PM    |           |              |
+-------------------+--------------------------+-----------+--------------+
 
 
 
+--------------+-------------------+--------------------+--------------+
| Performing   | Address           | City/State[facility] | Phone Number |
| Organization |                   |                    |              |
+--------------+-------------------+--------------------+--------------+
|   [facility]   |   [address] | [place] [address] |              |
+--------------+-------------------+--------------------+--------------+
 POCT glucose (2019  5:21 AM)
 
+--------------------+--------------------------+---------------+-----------------+
 
| Component          | Value                    | Ref Range     | Performed At    |
+--------------------+--------------------------+---------------+-----------------+
| GLUCOSE,POC SCREEN | 142 (H)Comment: Testing  | 65 - 99 mg/dL | [facility] |
|                    | performed [place] [facility] [place]     |               |                 |
|                    | [address];[place], [place]   |               |                 |
|                    | [address]                    |               |                 |
+--------------------+--------------------------+---------------+-----------------+
 
 
 
+-------------------+------------------+--------------------+--------------+
| Performing        | Address          | City/State/Zipcode | Phone Number |
| Organization      |                  |                    |              |
+-------------------+------------------+--------------------+--------------+
|   [facility] |   [address] | [place] [address] |              |
+-------------------+------------------+--------------------+--------------+
 Basic metabolic panel (2019  4:53 AM)
 
+----------------+--------------------------+-------------------+--------------+
| Component      | Value                    | Ref Range         | Performed At |
+----------------+--------------------------+-------------------+--------------+
| SODIUM         | 140                      | 135 - 145 mmol/L  | [facility]   |
|                |                          |                   | LABORATORY   |
+----------------+--------------------------+-------------------+--------------+
| POTASSIUM      | 4.2                      | 3.5 - 4.9 mmol/L  | [facility]   |
|                |                          |                   | LABORATORY   |
+----------------+--------------------------+-------------------+--------------+
| CHLORIDE       | 101                      | 99 - 109 mmol/L   | [facility]   |
|                |                          |                   | LABORATORY   |
+----------------+--------------------------+-------------------+--------------+
| CO2            | 28                       | 23 - 32 mmol/L    | [facility]   |
|                |                          |                   | LABORATORY   |
+----------------+--------------------------+-------------------+--------------+
| ANION GAP AGAP | 15                       | 5 - 20 mmol/L     | [facility]   |
|                |                          |                   | LABORATORY   |
+----------------+--------------------------+-------------------+--------------+
| GLUCOSE        | 157 (H)                  | 65 - 99 mg/dL     | [facility]   |
|                |                          |                   | LABORATORY   |
+----------------+--------------------------+-------------------+--------------+
| BUN            | 26 (H)                   | 8 - 25 mg/dL      | [facility]   |
|                |                          |                   | LABORATORY   |
+----------------+--------------------------+-------------------+--------------+
| CREATININE     | 6.8 (H)                  | 0.50 - 1.00 mg/dL | [facility]   |
|                |                          |                   | LABORATORY   |
+----------------+--------------------------+-------------------+--------------+
| BUN/CREAT      | 4                        |                   | [facility]   |
|                |                          |                   | LABORATORY   |
+----------------+--------------------------+-------------------+--------------+
| CALCIUM        | 9.3                      | 8.5 - 10.5 mg/dL  | [facility]   |
|                |                          |                   | LABORATORY   |
+----------------+--------------------------+-------------------+--------------+
| EGFR           | 6 (L)Comment: GFR <60:   | >60 mL/min/1.73m2 | [facility]   |
|                | CHRONIC KIDNEY DISEASE,  |                   | LABORATORY   |
|                | IF FOUND OVER A 3 MONTH  |                   |              |
|                | PERIOD.GFR <15: KIDNEY   |                   |              |
|                | FAILURE.FOR       |                   |              |
|                | [employer], MULTIPLY THE  |                   |              |
|                | CALCULATED GFR BY        |                   |              |
|                | 1.210.This eGFR is       |                   |              |
|                | calculated using the     |                   |              |
 
|                | MDRD IDMS traceable      |                   |              |
|                | equation.Testing         |                   |              |
|                | performed at [facility], [address] |                   |              |
|                |  [facility],        |                   |              |
|                | [place]    [address]   |                   |              |
+----------------+--------------------------+-------------------+--------------+
 
 
 
+----------+
| Specimen |
+----------+
| Blood    |
+----------+
 
 
 
+---------------+-------------------------+---------------------+----------------+
| Performing    | Address                 | City/State/Zipcode  | Phone Number   |
| Organization  |                         |                     |                |
+---------------+-------------------------+---------------------+----------------+
|   [facility]  |   [address]  | [place] [address] |   [phone] |
| LABORATORY    | [address]                   |                     |                |
+---------------+-------------------------+---------------------+----------------+
 CBC w/auto diff (reflex to manual) (2019  4:53 AM)
 
+-----------------+--------------------------+-------------------+--------------+
| Component       | Value                    | Ref Range         | Performed At |
+-----------------+--------------------------+-------------------+--------------+
| WBC             | 3.39 (L)                 | 3.80 - 11.00 K/uL | [facility]   |
|                 |                          |                   | LABORATORY   |
+-----------------+--------------------------+-------------------+--------------+
| RBC             | 2.78 (L)                 | 3.70 - 5.10 M/uL  | [facility]   |
|                 |                          |                   | LABORATORY   |
+-----------------+--------------------------+-------------------+--------------+
| HGB             | 9.5 (L)                  | 11.3 - 15.5 g/dL  | [facility]   |
|                 |                          |                   | LABORATORY   |
+-----------------+--------------------------+-------------------+--------------+
| HCT             | 29.2 (L)                 | 34.0 - 46.0 %     | [facility]   |
|                 |                          |                   | LABORATORY   |
+-----------------+--------------------------+-------------------+--------------+
| MCV             | 104.7 (H)                | 80.0 - 100.0 fl   | [facility]   |
|                 |                          |                   | LABORATORY   |
+-----------------+--------------------------+-------------------+--------------+
| MCH             | 34.0                     | 27.0 - 34.0 pg    | [facility]   |
|                 |                          |                   | LABORATORY   |
+-----------------+--------------------------+-------------------+--------------+
| MCHC            | 32.5                     | 32.0 - 35.5 g/dL  | [facility]   |
|                 |                          |                   | LABORATORY   |
+-----------------+--------------------------+-------------------+--------------+
| RDW SD          | 63.0 (H)                 | 37 - 53 fl        | [facility]   |
|                 |                          |                   | LABORATORY   |
+-----------------+--------------------------+-------------------+--------------+
| PLT             | 125 (L)                  | 150 - 400 K/uL    | [facility]   |
|                 |                          |                   | LABORATORY   |
+-----------------+--------------------------+-------------------+--------------+
| MPV             | 9.4                      | fl                | [facility]   |
|                 |                          |                   | LABORATORY   |
+-----------------+--------------------------+-------------------+--------------+
| DIFF TYPE       | AUTOMATED                |                   | [facility]   |
 
|                 |                          |                   | LABORATORY   |
+-----------------+--------------------------+-------------------+--------------+
| NEUTROPHILS     | 66.95                    | %                 | [facility]   |
|                 |                          |                   | LABORATORY   |
+-----------------+--------------------------+-------------------+--------------+
| LYMPHOCYTES     | 20.34                    | %                 | [facility]   |
|                 |                          |                   | LABORATORY   |
+-----------------+--------------------------+-------------------+--------------+
| MONOCYTES       | 11.96                    | %                 | [facility]   |
|                 |                          |                   | LABORATORY   |
+-----------------+--------------------------+-------------------+--------------+
| EOSINOPHILS     | 0.04                     | %                 | [facility]   |
|                 |                          |                   | LABORATORY   |
+-----------------+--------------------------+-------------------+--------------+
| BASOPHILS       | 0.71                     | %                 | [facility]   |
|                 |                          |                   | LABORATORY   |
+-----------------+--------------------------+-------------------+--------------+
| NEUTROPHILS ABS | 2.27                     | 1.90 - 7.40 K/uL  | [facility]   |
|                 |                          |                   | LABORATORY   |
+-----------------+--------------------------+-------------------+--------------+
| LYMPHOCYTES ABS | 0.69 (L)                 | 1.00 - 3.90 K/uL  | [facility]   |
|                 |                          |                   | LABORATORY   |
+-----------------+--------------------------+-------------------+--------------+
| MONOCYTES ABS   | 0.41                     | 0.00 - 0.80 K/uL  | [facility]   |
|                 |                          |                   | LABORATORY   |
+-----------------+--------------------------+-------------------+--------------+
| EOSINOPHILS ABS | 0.00                     | 0.00 - 0.50 K/uL  | [facility]   |
|                 |                          |                   | LABORATORY   |
+-----------------+--------------------------+-------------------+--------------+
| BASOPHILS ABS   | 0.02Comment: Testing     | 0.00 - 0.10 K/uL  | [facility]   |
|                 | performed at [facility], [address] |                   | LABORATORY   |
|                 |  [address],        |                   |              |
|                 | [place]  [address]     |                   |              |
+-----------------+--------------------------+-------------------+--------------+
 
 
 
+----------+
| Specimen |
+----------+
| Blood    |
+----------+
 
 
 
+---------------+-------------------------+---------------------+----------------+
| Performing    | Address                 | City/State/Zipcode  | Phone Number   |
| Organization  |                         |                     |                |
+---------------+-------------------------+---------------------+----------------+
|   [facility]  |   [address]  | [place] [address] |   [phone] |
| LABORATORY    | [address]                   |                     |                |
+---------------+-------------------------+---------------------+----------------+
 POCT glucose (2019  9:00 PM)
 
+--------------------+--------------------------+---------------+-----------------+
| Component          | Value                    | Ref Range     | Performed At    |
+--------------------+--------------------------+---------------+-----------------+
| GLUCOSE,POC SCREEN | 148 (H)Comment: Testing  | 65 - 99 mg/dL | [facility]OR[facility] |
|                    | [place] [facility] [place]     |               |                 |
|                    | [address];[place]   |               |                 |
 
|                    | [id number]                    |               |                 |
+--------------------+--------------------------+---------------+-----------------+
 
 
 
+-------------------+------------------+--------------------+--------------+
| Performing        | Address          | City/State/Zipcode | Phone Number |
| Organization      |                  |                    |              |
+-------------------+------------------+--------------------+--------------+
|   [facility] LABOR[facility] |   [address] | [place] [address] |              |
+-------------------+------------------+--------------------+--------------+
 IR stent femoral popliteal (2019  6:45 PM)
 
+------------------------------------------------------------------------+--------------+
| Impressions                                                            | Performed At |
+------------------------------------------------------------------------+--------------+
|      1. Aorta and iliac arteries were calcified but without            |   [facility]     |
| significant stenosis.  2. Right SFA with high-grade stenosis           | RADIOLOGY    |
| proximally with good endograft results after angioplasty and stenting. |              |
|   3. 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
| artery to right foot.  4. Right posterior tibial artery was            |              |
| chronically occluded.     Electronically signed by Kirt Mclaughlin MD on    |              |
| 2019 3:50 PM                                                      |              |
+------------------------------------------------------------------------+--------------+
 
 
 
+------------------------------------------------------------------------+--------------+
| Narrative                                                              | Performed At |
+------------------------------------------------------------------------+--------------+
|   LOWER EXTREMITY ARTERIOGRAM, UNILATERAL     PREOPERATIVE             |   [facility]     |
| DIAGNOSIS:    Critical limb ischemia of right lower extremity with     | RADIOLOGY    |
| poorly healing wound of right great toe     POSTOPERATIVE              |              |
| DIAGNOSIS:    Same     PROCEDURE:  1. Moderate conscious sedation  2.  |              |
| Ultrasound guided access of the left common femoral artery  3.         |              |
| Aortogram  4. Selective right common femoral artery catheterization    |              |
| with right leg runoff  5. Right SFA stenting using 6 x 80 mm           |              |
| self-expanding stent  6 Drug eluting balloon angioplasty of right SFA  |              |
| using 4 x 100 mm Lutonix balloon     SURGEON: Kirt Mclaughlin MD              |              |
| ASSISTANT: None     ANESTHESIA: Moderate sedation and local anesthesia |              |
|      Informed consent was obtained from the patient. Continuous        |              |
| cardiac monitoring was performed throughout the procedure.  Conscious  |              |
| sedation was provided by the nursing staff during the procedure under  |              |
| my supervision.  Sedation time: 34 minutes  Medications: 2 mg Versed   |              |
| IV, 50 mcg Fentanyl IV     ESTIMATED BLOOD LOSS: Minimal               |              |
| CONTRAST:    53 mL of Isovue 250     INDICATIONS:    See preoperative  |              |
| history and physical     FINDINGS:    Aorta and iliac arteries         |              |
| calcified but without significant stenosis. Right SFA with high-grade  |              |
| stenosis proximally. After angioplasty and stenting, good angiographic |              |
|  results. Good two-vessel runoff to right foot via right anterior      |              |
| tibial artery and peroneal artery. Right posterior tibial artery was   |              |
| chronically occluded.     DESCRIPTION OF PROCEDURE:    The patient was |              |
|  properly identified and brought to the Cath Lab. The patient was      |              |
| placed supine on the catheter table and patient was given moderate     |              |
| sedation by nursing staff under my supervision. Patient's bilateral    |              |
| groins were then prepped and draped in the usual sterile fashion.      |              |
| Local anesthetic was given to the left groin and the left common       |              |
| femoral artery was accessed under ultrasound guidance using a          |              |
| micropuncture needle. A micropuncture sheath was inserted over a wire  |              |
| and up sized to a 4 French sheath. A Omni flush catheter was then      |              |
 
| inserted into the distal aorta and aortogram was then performed with   |              |
| the findings as described above. The Omni Flush catheter was then used |              |
|  to guide a Glidewire into the right common femoral artery and then    |              |
| the catheter was then advanced over the wire. A right lower extremity  |              |
| runoff was then performed with the findings as described above.        |              |
| Next, an Amplatzer wire was then inserted over the catheter and the 4  |              |
| French sheath was removed. A 6 French destination sheath was then      |              |
| inserted over the wire with the tip of the sheath being placed into    |              |
| the right common femoral artery. A vertebral catheter was inserted     |              |
| over the wire and the wire was then removed. A Glidewire was then      |              |
| placed into the vertebral catheter and used to cross through the       |              |
| stenotic right SFA.    Next, a 260 fix core wire was then inserted     |              |
| over the catheter.    Right SFA was stented using 6 x 80 mm            |              |
| self-expanding stent. Drug eluting balloon angioplasty of the right    |              |
| SFA was then performed using 4 x 100 mm Lutonix balloon.     A final   |              |
| arteriogram was then performed through the sheath. The destination     |              |
| sheath was then removed and a Mynx closure device was then used on the |              |
|  left common femoral artery and hemostasis was ensured. The patient    |              |
| was then taken to the observation unit for further monitoring.         |              |
+------------------------------------------------------------------------+--------------+
 
 
 
+-------------------------------------------------------------------------------------------
--------------------------------------------------------------------------------------------
-----------------------------------+
| Procedure Note                                                                            
                                                                                            
                                   |
+-------------------------------------------------------------------------------------------
--------------------------------------------------------------------------------------------
-----------------------------------+
|   Denny, Rad Results In - 2019  8:40 AM PST  LOWER EXTREMITY ARTERIOGRAM,             
                                                                                            
                                   |
| UNILATERALPREOPERATIVE DIAGNOSIS:  Critical limb ischemia of right lower extremity with   
                                                                                            
                                   |
| poorly healing wound of right great toePOSTOPERATIVE DIAGNOSIS:  SamePROCEDURE:1.         
                                                                                            
                                   |
| Moderate conscious sedation2. Ultrasound guided access of the left common femoral         
                                                                                            
                                   |
| artery3. Aortogram4. Selective right common femoral artery catheterization with right     
                                                                                            
                                   |
| leg runoff5. Right SFA stenting using 6 x 80 mm self-expanding stent6 Drug eluting        
                                                                                            
                                   |
| balloon angioplasty of right SFA using 4 x 100 mm Lutonix balloonSURGEON: Kirt Mclaughlin,        
                                                                                            
                                   |
| MDASSISTANT: NoneANESTHESIA: Moderate sedation and local anesthesiaInformed consent was   
                                                                                            
                                   |
| obtained from the patient. Continuous cardiac monitoring was performed throughout the     
                                                                                            
                                   |
| procedure.Conscious sedation was provided by the nursing staff during the procedure       
 
                                                                                            
                                   |
| under my supervision.Sedation time: 34 minutesMedications: 2 mg Versed IV, 50 mcg         
                                                                                            
                                   |
| Fentanyl IVESTIMATED BLOOD LOSS: MinimalCONTRAST:  53 mL of Isovue 250INDICATIONS:  See   
                                                                                            
                                   |
| preoperative history and physicalFINDINGS:  Aorta and iliac arteries calcified but        
                                                                                            
                                   |
| without significant stenosis. Right SFA with high-grade stenosis proximally. After        
                                                                                            
                                   |
| angioplasty and stenting, good angiographic results. Good two-vessel runoff to right      
                                                                                            
                                   |
| foot via right anterior tibial artery and peroneal artery. Right posterior tibial artery  
                                                                                            
                                   |
|  was chronically occluded.DESCRIPTION OF PROCEDURE:  The patient was properly identified  
                                                                                            
                                   |
|  and brought to the Cath Lab. The patient was placed supine on the catheter table and     
                                                                                            
                                   |
| patient was given moderate sedation by nursing staff under my supervision. Patient's      
                                                                                            
                                   |
| bilateral groins were then prepped and draped in the usual sterile fashion. Local         
                                                                                            
                                   |
| anesthetic was given to the left groin and the left common femoral artery was accessed    
                                                                                            
                                   |
| under ultrasound guidance using a micropuncture needle. A micropuncture sheath was        
                                                                                            
                                   |
| inserted over a wire and up sized to a 4 French sheath. A Omni flush catheter was then    
                                                                                            
                                   |
| inserted into the distal aorta and aortogram was then performed with the findings as      
                                                                                            
                                   |
| described above. The Omni Flush catheter was then used to guide a Glidewire into the      
                                                                                            
                                   |
| right common femoral artery and then the catheter was then advanced over the wire. A      
                                                                                            
                                   |
| right lower extremity runoff was then performed with the findings as described            
                                                                                            
                                   |
| above.Next, an Amplatzer wire was then inserted over the catheter and the 4 French        
                                                                                            
                                   |
| sheath was removed. A 6 French destination sheath was then inserted over the wire with    
                                                                                            
                                   |
| the tip of the sheath being placed into the right common femoral artery. A vertebral      
 
                                                                                            
                                   |
| catheter was inserted over the wire and the wire was then removed. A Glidewire was then   
                                                                                            
                                   |
| placed into the vertebral catheter and used to cross through the stenotic right SFA.      
                                                                                            
                                   |
| Next, a 260 fix core wire was then inserted over the catheter.  Right SFA was stented     
                                                                                            
                                   |
| using 6 x 80 mm self-expanding stent. Drug eluting balloon angioplasty of the right SFA   
                                                                                            
                                   |
| was then performed using 4 x 100 mm Lutonix balloon.A final arteriogram was then          
                                                                                            
                                   |
| performed through the sheath. The destination sheath was then removed and a Mynx closure  
                                                                                            
                                   |
|  device was then used on the left common femoral artery and hemostasis was ensured. The   
                                                                                            
                                   |
| patient was then taken to the observation unit for further monitoring.IMPRESSION:1.       
                                                                                            
                                   |
| Aorta and iliac arteries were calcified but without significant stenosis.2. Right SFA     
                                                                                            
                                   |
| with high-grade stenosis proximally with good endograft results after angioplasty and     
                                                                                            
                                   |
| stenting.3. Two-vessel runoff via right anterior tibial artery and peroneal artery to     
                                                                                            
                                   |
| right foot.4. 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       
                                                                                            
                                   |
| signed by Kirt Mclaughlin MD on 2019 3:50 PM                                              
                                                                                            
                                   |
|                                                                                           
                                                                                            
                                   |
|A final arteriogram was then performed through the sheath. The destination sheath was then 
removed and a Mynx closure device was then used on the left common femoral artery and hemost
asis was ensured. The patient was  |
|then taken to the observation unit for further monitoring.                                 
                                                                                            
                                   |
|                                                                                           
                                                                                            
                                   |
|IMPRESSION:                                                                                
                                                                                            
                                  |
|                                                                                           
                                                                                            
                                   |
|1. Aorta and iliac arteries were calcified but without significant stenosis.               
 
                                                                                            
                                   |
|2. Right SFA with high-grade stenosis proximally with good endograft results after angiopla
sty and stenting.                                                                           
                                   |
|3. Two-vessel runoff via right anterior tibial artery and peroneal artery to right foot.   
                                                                                            
                                   |
|4. 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                                 
                                                                                            
                                   |
|                                                                                           
                                                                                            
                                   |
|Electronically signed by Kirt Mclaughlin MD on 2019 3:50 PM                                
                                                                                            
                                   |
+-------------------------------------------------------------------------------------------
--------------------------------------------------------------------------------------------
-----------------------------------+
 
 
 
+--------------------+------------------+--------------------+--------------+
| Performing         | Address          | City/State[facility] | Phone Number |
| Organization       |                  |                    |              |
+--------------------+------------------+--------------------+--------------+
|   [facility] |   [address] | [place] [address] |              |
+--------------------+------------------+--------------------+--------------+
 IR aortagram abdominal (2019  6:45 PM)
 
+------------------------------------------------------------------------+--------------+
| Impressions                                                            | Performed At |
+------------------------------------------------------------------------+--------------+
|      1. Aorta and iliac arteries were calcified but without            |   [facility]     |
| significant stenosis.  2. Right SFA with high-grade stenosis           | RADIOLOGY    |
| proximally with good endograft results after angioplasty and stenting. |              |
|   3. 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
| artery to right foot.  4. Right posterior tibial artery was            |              |
| chronically occluded.     Electronically signed by Kirt Mclaughlin MD on    |              |
| 2019 3:50 PM                                                      |              |
+------------------------------------------------------------------------+--------------+
 
 
 
+------------------------------------------------------------------------+--------------+
| Narrative                                                              | Performed At |
+------------------------------------------------------------------------+--------------+
|   LOWER EXTREMITY ARTERIOGRAM, UNILATERAL     PREOPERATIVE             |   [facility]     |
| DIAGNOSIS:    Critical limb ischemia of right lower extremity with     | RADIOLOGY    |
| poorly healing wound of right great toe     POSTOPERATIVE              |              |
| DIAGNOSIS:    Same     PROCEDURE:  1. Moderate conscious sedation  2.  |              |
| Ultrasound guided access of the left common femoral artery  3.         |              |
| Aortogram  4. Selective right common femoral artery catheterization    |              |
| with right leg runoff  5. Right SFA stenting using 6 x 80 mm           |              |
| self-expanding stent  6 Drug eluting balloon angioplasty of right SFA  |              |
| using 4 x 100 mm Lutonix balloon     SURGEON: Kirt Mclaughlin MD              |              |
| ASSISTANT: None     ANESTHESIA: Moderate sedation and local anesthesia |              |
|      Informed consent was obtained from the patient. Continuous        |              |
| cardiac monitoring was performed throughout the procedure.  Conscious  |              |
 
| sedation was provided by the nursing staff during the procedure under  |              |
| my supervision.  Sedation time: 34 minutes  Medications: 2 mg Versed   |              |
| IV, 50 mcg Fentanyl IV     ESTIMATED BLOOD LOSS: Minimal               |              |
| CONTRAST:    53 mL of Isovue 250     INDICATIONS:    See preoperative  |              |
| history and physical     FINDINGS:    Aorta and iliac arteries         |              |
| calcified but without significant stenosis. Right SFA with high-grade  |              |
| stenosis proximally. After angioplasty and stenting, good angiographic |              |
|  results. Good two-vessel runoff to right foot via right anterior      |              |
| tibial artery and peroneal artery. Right posterior tibial artery was   |              |
| chronically occluded.     DESCRIPTION OF PROCEDURE:    The patient was |              |
|  properly identified and brought to the Cath Lab. The patient was      |              |
| placed supine on the catheter table and patient was given moderate     |              |
| sedation by nursing staff under my supervision. Patient's bilateral    |              |
| groins were then prepped and draped in the usual sterile fashion.      |              |
| Local anesthetic was given to the left groin and the left common       |              |
| femoral artery was accessed under ultrasound guidance using a          |              |
| micropuncture needle. A micropuncture sheath was inserted over a wire  |              |
| and up sized to a 4 French sheath. A Omni flush catheter was then      |              |
| inserted into the distal aorta and aortogram was then performed with   |              |
| the findings as described above. The Omni Flush catheter was then used |              |
|  to guide a Glidewire into the right common femoral artery and then    |              |
| the catheter was then advanced over the wire. A right lower extremity  |              |
| runoff was then performed with the findings as described above.        |              |
| Next, an Amplatzer wire was then inserted over the catheter and the 4  |              |
| French sheath was removed. A 6 French destination sheath was then      |              |
| inserted over the wire with the tip of the sheath being placed into    |              |
| the right common femoral artery. A vertebral catheter was inserted     |              |
| over the wire and the wire was then removed. A Glidewire was then      |              |
| placed into the vertebral catheter and used to cross through the       |              |
| stenotic right SFA.    Next, a 260 fix core wire was then inserted     |              |
| over the catheter.    Right SFA was stented using 6 x 80 mm            |              |
| self-expanding stent. Drug eluting balloon angioplasty of the right    |              |
| SFA was then performed using 4 x 100 mm Lutonix balloon.     A final   |              |
| arteriogram was then performed through the sheath. The destination     |              |
| sheath was then removed and a Mynx closure device was then used on the |              |
|  left common femoral artery and hemostasis was ensured. The patient    |              |
| was then taken to the observation unit for further monitoring.         |              |
+------------------------------------------------------------------------+--------------+
 
 
 
+-------------------------------------------------------------------------------------------
--------------------------------------------------------------------------------------------
-----------------------------------+
| Procedure Note                                                                            
                                                                                            
                                   |
+-------------------------------------------------------------------------------------------
--------------------------------------------------------------------------------------------
-----------------------------------+
|   Denny, Rad Results In - 2019  8:40 AM PST  LOWER EXTREMITY ARTERIOGRAM,             
                                                                                            
                                   |
| UNILATERALPREOPERATIVE DIAGNOSIS:  Critical limb ischemia of right lower extremity with   
                                                                                            
                                   |
| poorly healing wound of right great toePOSTOPERATIVE DIAGNOSIS:  SamePROCEDURE:1.         
                                                                                            
                                   |
| Moderate conscious sedation2. Ultrasound guided access of the left common femoral         
 
                                                                                            
                                   |
| artery3. Aortogram4. Selective right common femoral artery catheterization with right     
                                                                                            
                                   |
| leg runoff5. Right SFA stenting using 6 x 80 mm self-expanding stent6 Drug eluting        
                                                                                            
                                   |
| balloon angioplasty of right SFA using 4 x 100 mm Lutonix balloonSURGEON: Kirt Mclaughlin,        
                                                                                            
                                   |
| MDASSISTANT: NoneANESTHESIA: Moderate sedation and local anesthesiaInformed consent was   
                                                                                            
                                   |
| obtained from the patient. Continuous cardiac monitoring was performed throughout the     
                                                                                            
                                   |
| procedure.Conscious sedation was provided by the nursing staff during the procedure       
                                                                                            
                                   |
| under my supervision.Sedation time: 34 minutesMedications: 2 mg Versed IV, 50 mcg         
                                                                                            
                                   |
| Fentanyl IVESTIMATED BLOOD LOSS: MinimalCONTRAST:  53 mL of Isovue 250INDICATIONS:  See   
                                                                                            
                                   |
| preoperative history and physicalFINDINGS:  Aorta and iliac arteries calcified but        
                                                                                            
                                   |
| without significant stenosis. Right SFA with high-grade stenosis proximally. After        
                                                                                            
                                   |
| angioplasty and stenting, good angiographic results. Good two-vessel runoff to right      
                                                                                            
                                   |
| foot via right anterior tibial artery and peroneal artery. Right posterior tibial artery  
                                                                                            
                                   |
|  was chronically occluded.DESCRIPTION OF PROCEDURE:  The patient was properly identified  
                                                                                            
                                   |
|  and brought to the Cath Lab. The patient was placed supine on the catheter table and     
                                                                                            
                                   |
| patient was given moderate sedation by nursing staff under my supervision. Patient's      
                                                                                            
                                   |
| bilateral groins were then prepped and draped in the usual sterile fashion. Local         
                                                                                            
                                   |
| anesthetic was given to the left groin and the left common femoral artery was accessed    
                                                                                            
                                   |
| under ultrasound guidance using a micropuncture needle. A micropuncture sheath was        
                                                                                            
                                   |
| inserted over a wire and up sized to a 4 French sheath. A Omni flush catheter was then    
                                                                                            
                                   |
| inserted into the distal aorta and aortogram was then performed with the findings as      
 
                                                                                            
                                   |
| described above. The Omni Flush catheter was then used to guide a Glidewire into the      
                                                                                            
                                   |
| right common femoral artery and then the catheter was then advanced over the wire. A      
                                                                                            
                                   |
| right lower extremity runoff was then performed with the findings as described            
                                                                                            
                                   |
| above.Next, an Amplatzer wire was then inserted over the catheter and the 4 French        
                                                                                            
                                   |
| sheath was removed. A 6 French destination sheath was then inserted over the wire with    
                                                                                            
                                   |
| the tip of the sheath being placed into the right common femoral artery. A vertebral      
                                                                                            
                                   |
| catheter was inserted over the wire and the wire was then removed. A Glidewire was then   
                                                                                            
                                   |
| placed into the vertebral catheter and used to cross through the stenotic right SFA.      
                                                                                            
                                   |
| Next, a 260 fix core wire was then inserted over the catheter.  Right SFA was stented     
                                                                                            
                                   |
| using 6 x 80 mm self-expanding stent. Drug eluting balloon angioplasty of the right SFA   
                                                                                            
                                   |
| was then performed using 4 x 100 mm Lutonix balloon.A final arteriogram was then          
                                                                                            
                                   |
| performed through the sheath. The destination sheath was then removed and a Mynx closure  
                                                                                            
                                   |
|  device was then used on the left common femoral artery and hemostasis was ensured. The   
                                                                                            
                                   |
| patient was then taken to the observation unit for further monitoring.IMPRESSION:1.       
                                                                                            
                                   |
| Aorta and iliac arteries were calcified but without significant stenosis.2. Right SFA     
                                                                                            
                                   |
| with high-grade stenosis proximally with good endograft results after angioplasty and     
                                                                                            
                                   |
| stenting.3. Two-vessel runoff via right anterior tibial artery and peroneal artery to     
                                                                                            
                                   |
| right foot.4. 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       
                                                                                            
                                   |
| signed by Kirt Mclaughlin MD on 2019 3:50 PM                                              
                                                                                            
                                   |
|                                                                                           
 
                                                                                            
                                   |
|A final arteriogram was then performed through the sheath. The destination sheath was then 
removed and a Mynx closure device was then used on the left common femoral artery and hemost
asis was ensured. The patient was  |
|then taken to the observation unit for further monitoring.                                 
                                                                                            
                                   |
|                                                                                           
                                                                                            
                                   |
|IMPRESSION:                                                                                
                                                                                            
                                  |
|                                                                                           
                                                                                            
                                   |
|1. Aorta and iliac arteries were calcified but without significant stenosis.               
                                                                                            
                                   |
|2. Right SFA with high-grade stenosis proximally with good endograft results after angiopla
sty and stenting.                                                                           
                                   |
|3. Two-vessel runoff via right anterior tibial artery and peroneal artery to right foot.   
                                                                                            
                                   |
|4. 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                                 
                                                                                            
                                   |
|                                                                                           
                                                                                            
                                   |
|Electronically signed by Kirt Mclaughlin MD on 2019 3:50 PM                                
                                                                                            
                                   |
+-------------------------------------------------------------------------------------------
--------------------------------------------------------------------------------------------
-----------------------------------+
 
 
 
+--------------------+------------------+--------------------+--------------+
| Performing         | Address          | City/State/Zipcode | Phone Number |
| Organization       |                  |                    |              |
+--------------------+------------------+--------------------+--------------+
|   [facility] |   [address] | [place] [address] |              |
+--------------------+------------------+--------------------+--------------+
 IR angiogram extremity right (2019  6:45 PM)
 
+------------------------------------------------------------------------+--------------+
| Impressions                                                            | Performed At |
+------------------------------------------------------------------------+--------------+
|      1. Aorta and iliac arteries were calcified but without            |   [facility]     |
| significant stenosis.  2. Right SFA with high-grade stenosis           | RADIOLOGY    |
| proximally with good endograft results after angioplasty and stenting. |              |
|   3. 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
| artery to right foot.  4. Right posterior tibial artery was            |              |
| chronically occluded.     Electronically signed by Kirt Mclaughlin MD on    |              |
| 2019 3:50 PM                                                      |              |
+------------------------------------------------------------------------+--------------+
 
 
 
 
+------------------------------------------------------------------------+--------------+
| Narrative                                                              | Performed At |
+------------------------------------------------------------------------+--------------+
|   LOWER EXTREMITY ARTERIOGRAM, UNILATERAL     PREOPERATIVE             |   [facility]     |
| DIAGNOSIS:    Critical limb ischemia of right lower extremity with     | RADIOLOGY    |
| poorly healing wound of right great toe     POSTOPERATIVE              |              |
| DIAGNOSIS:    Same     PROCEDURE:  1. Moderate conscious sedation  2.  |              |
| Ultrasound guided access of the left common femoral artery  3.         |              |
| Aortogram  4. Selective right common femoral artery catheterization    |              |
| with right leg runoff  5. Right SFA stenting using 6 x 80 mm           |              |
| self-expanding stent  6 Drug eluting balloon angioplasty of right SFA  |              |
| using 4 x 100 mm Lutonix balloon     SURGEON: Kirt Mclaughlin MD              |              |
| ASSISTANT: None     ANESTHESIA: Moderate sedation and local anesthesia |              |
|      Informed consent was obtained from the patient. Continuous        |              |
| cardiac monitoring was performed throughout the procedure.  Conscious  |              |
| sedation was provided by the nursing staff during the procedure under  |              |
| my supervision.  Sedation time: 34 minutes  Medications: 2 mg Versed   |              |
| IV, 50 mcg Fentanyl IV     ESTIMATED BLOOD LOSS: Minimal               |              |
| CONTRAST:    53 mL of Isovue 250     INDICATIONS:    See preoperative  |              |
| history and physical     FINDINGS:    Aorta and iliac arteries         |              |
| calcified but without significant stenosis. Right SFA with high-grade  |              |
| stenosis proximally. After angioplasty and stenting, good angiographic |              |
|  results. Good two-vessel runoff to right foot via right anterior      |              |
| tibial artery and peroneal artery. Right posterior tibial artery was   |              |
| chronically occluded.     DESCRIPTION OF PROCEDURE:    The patient was |              |
|  properly identified and brought to the Cath Lab. The patient was      |              |
| placed supine on the catheter table and patient was given moderate     |              |
| sedation by nursing staff under my supervision. Patient's bilateral    |              |
| groins were then prepped and draped in the usual sterile fashion.      |              |
| Local anesthetic was given to the left groin and the left common       |              |
| femoral artery was accessed under ultrasound guidance using a          |              |
| micropuncture needle. A micropuncture sheath was inserted over a wire  |              |
| and up sized to a 4 French sheath. A Omni flush catheter was then      |              |
| inserted into the distal aorta and aortogram was then performed with   |              |
| the findings as described above. The Omni Flush catheter was then used |              |
|  to guide a Glidewire into the right common femoral artery and then    |              |
| the catheter was then advanced over the wire. A right lower extremity  |              |
| runoff was then performed with the findings as described above.        |              |
| Next, an Amplatzer wire was then inserted over the catheter and the 4  |              |
| French sheath was removed. A 6 French destination sheath was then      |              |
| inserted over the wire with the tip of the sheath being placed into    |              |
| the right common femoral artery. A vertebral catheter was inserted     |              |
| over the wire and the wire was then removed. A Glidewire was then      |              |
| placed into the vertebral catheter and used to cross through the       |              |
| stenotic right SFA.    Next, a 260 fix core wire was then inserted     |              |
| over the catheter.    Right SFA was stented using 6 x 80 mm            |              |
| self-expanding stent. Drug eluting balloon angioplasty of the right    |              |
| SFA was then performed using 4 x 100 mm Lutonix balloon.     A final   |              |
| arteriogram was then performed through the sheath. The destination     |              |
| sheath was then removed and a Mynx closure device was then used on the |              |
|  left common femoral artery and hemostasis was ensured. The patient    |              |
| was then taken to the observation unit for further monitoring.         |              |
+------------------------------------------------------------------------+--------------+
 
 
 
+-------------------------------------------------------------------------------------------
 
--------------------------------------------------------------------------------------------
-----------------------------------+
| Procedure Note                                                                            
                                                                                            
                                   |
+-------------------------------------------------------------------------------------------
--------------------------------------------------------------------------------------------
-----------------------------------+
|   Lauro Baldwin Results In - 2019  8:40 AM PST  LOWER EXTREMITY ARTERIOGRAM,             
                                                                                            
                                   |
| UNILATERALPREOPERATIVE DIAGNOSIS:  Critical limb ischemia of right lower extremity with   
                                                                                            
                                   |
| poorly healing wound of right great toePOSTOPERATIVE DIAGNOSIS:  SamePROCEDURE:1.         
                                                                                            
                                   |
| Moderate conscious sedation2. Ultrasound guided access of the left common femoral         
                                                                                            
                                   |
| artery3. Aortogram4. Selective right common femoral artery catheterization with right     
                                                                                            
                                   |
| leg runoff5. Right SFA stenting using 6 x 80 mm self-expanding stent6 Drug eluting        
                                                                                            
                                   |
| balloon angioplasty of right SFA using 4 x 100 mm Lutonix balloonSURGEON: Kirt Mclaughlin,        
                                                                                            
                                   |
| MDASSISTANT: NoneANESTHESIA: Moderate sedation and local anesthesiaInformed consent was   
                                                                                            
                                   |
| obtained from the patient. Continuous cardiac monitoring was performed throughout the     
                                                                                            
                                   |
| procedure.Conscious sedation was provided by the nursing staff during the procedure       
                                                                                            
                                   |
| under my supervision.Sedation time: 34 minutesMedications: 2 mg Versed IV, 50 mcg         
                                                                                            
                                   |
| Fentanyl IVESTIMATED BLOOD LOSS: MinimalCONTRAST:  53 mL of Isovue 250INDICATIONS:  See   
                                                                                            
                                   |
| preoperative history and physicalFINDINGS:  Aorta and iliac arteries calcified but        
                                                                                            
                                   |
| without significant stenosis. Right SFA with high-grade stenosis proximally. After        
                                                                                            
                                   |
| angioplasty and stenting, good angiographic results. Good two-vessel runoff to right      
                                                                                            
                                   |
| foot via right anterior tibial artery and peroneal artery. Right posterior tibial artery  
                                                                                            
                                   |
|  was chronically occluded.DESCRIPTION OF PROCEDURE:  The patient was properly identified  
                                                                                            
                                   |
|  and brought to the Cath Lab. The patient was placed supine on the catheter table and     
 
                                                                                            
                                   |
| patient was given moderate sedation by nursing staff under my supervision. Patient's      
                                                                                            
                                   |
| bilateral groins were then prepped and draped in the usual sterile fashion. Local         
                                                                                            
                                   |
| anesthetic was given to the left groin and the left common femoral artery was accessed    
                                                                                            
                                   |
| under ultrasound guidance using a micropuncture needle. A micropuncture sheath was        
                                                                                            
                                   |
| inserted over a wire and up sized to a 4 French sheath. A Omni flush catheter was then    
                                                                                            
                                   |
| inserted into the distal aorta and aortogram was then performed with the findings as      
                                                                                            
                                   |
| described above. The Omni Flush catheter was then used to guide a Glidewire into the      
                                                                                            
                                   |
| right common femoral artery and then the catheter was then advanced over the wire. A      
                                                                                            
                                   |
| right lower extremity runoff was then performed with the findings as described            
                                                                                            
                                   |
| above.Next, an Amplatzer wire was then inserted over the catheter and the 4 French        
                                                                                            
                                   |
| sheath was removed. A 6 French destination sheath was then inserted over the wire with    
                                                                                            
                                   |
| the tip of the sheath being placed into the right common femoral artery. A vertebral      
                                                                                            
                                   |
| catheter was inserted over the wire and the wire was then removed. A Glidewire was then   
                                                                                            
                                   |
| placed into the vertebral catheter and used to cross through the stenotic right SFA.      
                                                                                            
                                   |
| Next, a 260 fix core wire was then inserted over the catheter.  Right SFA was stented     
                                                                                            
                                   |
| using 6 x 80 mm self-expanding stent. Drug eluting balloon angioplasty of the right SFA   
                                                                                            
                                   |
| was then performed using 4 x 100 mm Lutonix balloon.A final arteriogram was then          
                                                                                            
                                   |
| performed through the sheath. The destination sheath was then removed and a Mynx closure  
                                                                                            
                                   |
|  device was then used on the left common femoral artery and hemostasis was ensured. The   
                                                                                            
                                   |
| patient was then taken to the observation unit for further monitoring.IMPRESSION:1.       
 
                                                                                            
                                   |
| Aorta and iliac arteries were calcified but without significant stenosis.2. Right SFA     
                                                                                            
                                   |
| with high-grade stenosis proximally with good endograft results after angioplasty and     
                                                                                            
                                   |
| stenting.3. Two-vessel runoff via right anterior tibial artery and peroneal artery to     
                                                                                            
                                   |
| right foot.4. 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       
                                                                                            
                                   |
| signed by Kirt Mclaughlin MD on 2019 3:50 PM                                              
                                                                                            
                                   |
|                                                                                           
                                                                                            
                                   |
|A final arteriogram was then performed through the sheath. The destination sheath was then 
removed and a Mynx closure device was then used on the left common femoral artery and hemost
asis was ensured. The patient was  |
|then taken to the observation unit for further monitoring.                                 
                                                                                            
                                   |
|                                                                                           
                                                                                            
                                   |
|IMPRESSION:                                                                                
                                                                                            
                                  |
|                                                                                           
                                                                                            
                                   |
|1. Aorta and iliac arteries were calcified but without significant stenosis.               
                                                                                            
                                   |
|2. Right SFA with high-grade stenosis proximally with good endograft results after angiopla
sty and stenting.                                                                           
                                   |
|3. Two-vessel runoff via right anterior tibial artery and peroneal artery to right foot.   
                                                                                            
                                   |
|4. 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                                 
                                                                                            
                                   |
|                                                                                           
                                                                                            
                                   |
|Electronically signed by Kirt Mclaughlin MD on 2019 3:50 PM                                
                                                                                            
                                   |
+-------------------------------------------------------------------------------------------
--------------------------------------------------------------------------------------------
-----------------------------------+
 
 
 
+--------------------+------------------+--------------------+--------------+
 
| Performing         | Address          | City/State/Zipcode | Phone Number |
| Organization       |                  |                    |              |
+--------------------+------------------+--------------------+--------------+
|   [facility] |   [address] | [place] [address] |              |
+--------------------+------------------+--------------------+--------------+
 Blood Culture Set 2 (2019  6:05 PM)
 
+----------------------+------------------------+-----------+--------------+
| Component            | Value                  | Ref Range | Performed At |
+----------------------+------------------------+-----------+--------------+
| Specimen Description | BLOOD, PERIPHERAL DRAW |           | [facility]   |
|                      |                        |           | LABORATORY   |
+----------------------+------------------------+-----------+--------------+
| CULTURE              | NO GROWTH 6 DAYS       |           | [facility]   |
|                      |                        |           | LABORATORY   |
+----------------------+------------------------+-----------+--------------+
 
 
 
+-----------------+
| Specimen        |
+-----------------+
| Blood - Blood,  |
| peripheral draw |
+-----------------+
 
 
 
+---------------+-------------------------+---------------------+----------------+
| Performing    | Address                 | City/State/Zipcode  | Phone Number   |
| Organization  |                         |                     |                |
+---------------+-------------------------+---------------------+----------------+
|   [facility]  |   [address]  | [place] [address] |   [phone] |
| LABORATORY    | [address]                   |                     |                |
+---------------+-------------------------+---------------------+----------------+
 IR guidance vascular access US (2019  5:40 PM)
 
+------------------------------------------------------------------------+--------------+
| Narrative                                                              | Performed At |
+------------------------------------------------------------------------+--------------+
|   This Point of Care (POC) ultrasound image has been reviewed and      |   [facility]     |
| interpreted by the physician identified as the performing physician in | RADIOLOGY    |
|  the   associated interpretation and report.                           |              |
+------------------------------------------------------------------------+--------------+
 
 
 
+--------------------+------------------+--------------------+--------------+
| Performing         | Address          | City/State/Zipcode | Phone Number |
| Organization       |                  |                    |              |
+--------------------+------------------+--------------------+--------------+
|   [facility] |   [address] | [place] [address] |              |
+--------------------+------------------+--------------------+--------------+
 C-reactive protein (2019  3:43 PM)
 
+-----------+--------------------------+------------+-----------------+
| Component | Value                    | Ref Range  | Performed At    |
+-----------+--------------------------+------------+-----------------+
| CRP       | 0.6 (H)Comment: Testing  | <0.5 mg/dL | [facility] |
|           | performed [place] [facility] [place]     |            |                 |
 
|           | [address];[place]   |            |                 |
|           | [id number]                    |            |                 |
+-----------+--------------------------+------------+-----------------+
 
 
 
+-------------------+
| Specimen          |
+-------------------+
| Blood - Arm, Left |
+-------------------+
 
 
 
+-------------------+------------------+--------------------+--------------+
| Performing        | Address          | City/State/Zipcode | Phone Number |
| Organization      |                  |                    |              |
+-------------------+------------------+--------------------+--------------+
|   [facility] |   [address] | [place] [address] |              |
+-------------------+------------------+--------------------+--------------+
 Sedimentation rate, automated (2019  3:43 PM)
 
+-----------+-------------------------+--------------+-----------------+
| Component | Value                   | Ref Range    | Performed At    |
+-----------+-------------------------+--------------+-----------------+
| ESR       | 13Comment: Testing      | 0 - 30 mm/Hr | [facility] |
|           | performed at [facility] [place];[address]    |              |                 |
|           | [address];[place]  |              |                 |
|           | [id number]                   |              |                 |
+-----------+-------------------------+--------------+-----------------+
 
 
 
+-------------------+
| Specimen          |
+-------------------+
| Blood - Arm, Left |
+-------------------+
 
 
 
+-------------------+------------------+--------------------+--------------+
| Performing        | Address          | City/State/Zipcode | Phone Number |
| Organization      |                  |                    |              |
+-------------------+------------------+--------------------+--------------+
|   [facility] |   [address] | [place] [address] |              |
+-------------------+------------------+--------------------+--------------+
 CPK (2019  3:43 PM)
 
+-----------+-------------------------+--------------+-----------------+
| Component | Value                   | Ref Range    | Performed At    |
+-----------+-------------------------+--------------+-----------------+
| CPK       | 28 (L)Comment: Testing  | 30 - 240 U/L | [facility] |
|           | performed at [facility] [place]    |              |                 |
|           | [address];[place]  |              |                 |
|           | [id number]                   |              |                 |
+-----------+-------------------------+--------------+-----------------+
 
 
 
 
+-------------------+
| Specimen          |
+-------------------+
| Blood - Arm, Left |
+-------------------+
 
 
 
+-------------------+------------------+--------------------+--------------+
| Performing        | Address          | City/State/Zipcode | Phone Number |
| Organization      |                  |                    |              |
+-------------------+------------------+--------------------+--------------+
|   [facility] |   [address] | [place] [address] |              |
+-------------------+------------------+--------------------+--------------+
 Comprehensive metabolic panel (2019  3:43 PM)
 
+----------------+--------------------------+-------------------+-----------------+
| Component      | Value                    | Ref Range         | Performed At    |
+----------------+--------------------------+-------------------+-----------------+
| SODIUM         | 139                      | 135 - 145 mmol/L  | [facility] |
+----------------+--------------------------+-------------------+-----------------+
| POTASSIUM      | 4.0                      | 3.5 - 4.9 mmol/L  | [facility] |
+----------------+--------------------------+-------------------+-----------------+
| CHLORIDE       | 100                      | 99 - 109 mmol/L   | [facility] |
+----------------+--------------------------+-------------------+-----------------+
| CO2            | 29                       | 23 - 32 mmol/L    | [facility] |
+----------------+--------------------------+-------------------+-----------------+
| ANION GAP AGAP | 14                       | 5 - 20 mmol/L     | [facility] |
+----------------+--------------------------+-------------------+-----------------+
| GLUCOSE        | 202 (H)                  | 65 - 99 mg/dL     | [facility] |
+----------------+--------------------------+-------------------+-----------------+
| BUN            | 23                       | 8 - 25 mg/dL      | [facility] |
+----------------+--------------------------+-------------------+-----------------+
| CREATININE     | 6.1 (H)                  | 0.50 - 1.00 mg/dL | [facility] |
+----------------+--------------------------+-------------------+-----------------+
| BUN/CREAT      | 4                        |                   | [facility] |
+----------------+--------------------------+-------------------+-----------------+
| CALCIUM        | 9.0                      | 8.5 - 10.5 mg/dL  | [facility] |
+----------------+--------------------------+-------------------+-----------------+
| TOTAL PROTEIN  | 6.2 (L)                  | 6.3 - 8.2 g/dL    | [facility] |
+----------------+--------------------------+-------------------+-----------------+
| Albumin        | 2.7 (L)                  | 3.3 - 4.8 g/dL    | [facility] |
+----------------+--------------------------+-------------------+-----------------+
| GLOBULIN       | 3.5                      | 1.3 - 4.9 g/dL    | [facility] |
+----------------+--------------------------+-------------------+-----------------+
| A/G            | 0.8 (L)                  | 1.0 - 2.4         | [facility] |
+----------------+--------------------------+-------------------+-----------------+
| TBIL           | 0.4                      | 0.1 - 1.5 mg/dL   | [facility] |
+----------------+--------------------------+-------------------+-----------------+
| ALK PHOS       | 110                      | 35 - 115 U/L      | [facility] |
+----------------+--------------------------+-------------------+-----------------+
| AST            | 24                       | 10 - 45 U/L       | [facility] |
+----------------+--------------------------+-------------------+-----------------+
| ALT            | 21                       | 10 - 65 U/L       | [facility] |
+----------------+--------------------------+-------------------+-----------------+
| EGFR           | 7 (L)Comment: GFR <60:   | >60 mL/min/1.73m2 | [facility] |
|                | CHRONIC KIDNEY DISEASE,  |                   |                 |
|                | IF FOUND OVER A 3 MONTH  |                   |                 |
|                | PERIOD.GFR <15: KIDNEY   |                   |                 |
|                | FAILURE.FOR       |                   |                 |
 
|                | AMERIC[employer], MULTIPLY THE  |                   |                 |
|                | CALCULATED GFR BY        |                   |                 |
|                | 1.210.This eGFR is       |                   |                 |
|                | calculated using the     |                   |                 |
|                | MDRD IDMS traceable      |                   |                 |
|                | equation.Testing         |                   |                 |
|                | performed at [facility] [place]     |                   |                 |
|                | [address];[place], [place]   |                   |                 |
|                | [id number]                    |                   |                 |
+----------------+--------------------------+-------------------+-----------------+
 
 
 
+-------------------+
| Specimen          |
+-------------------+
| Blood - Arm, Left |
+-------------------+
 
 
 
+-------------------+------------------+--------------------+--------------+
| Performing        | Address          | City/State/Zipcode | Phone Number |
| Organization      |                  |                    |              |
+-------------------+------------------+--------------------+--------------+
|   [place] [facility] |   [address] | [place] [address] |              |
+-------------------+------------------+--------------------+--------------+
 Blood Culture Set 1 (2019  3:43 PM)
 
+----------------------+------------------+-----------+-----------------+
| Component            | Value            | Ref Range | Performed At    |
+----------------------+------------------+-----------+-----------------+
| Specimen Description | BLOOD            |           | [facility]      |
|                      |                  |           | LABORATORY      |
+----------------------+------------------+-----------+-----------------+
| SPECIAL REQUESTS     | RAC              |           | [facility] |
+----------------------+------------------+-----------+-----------------+
| CULTURE              | NO GROWTH 6 DAYS |           | [facility]      |
|                      |                  |           | LABORATORY      |
+----------------------+------------------+-----------+-----------------+
 
 
 
+----------------+
| Specimen       |
+----------------+
| Blood - Blood  |
+----------------+
 
 
 
+-------------------+-------------------------+---------------------+----------------+
| Performing        | Address                 | City/State/Zipcode  | Phone Number   |
| Organization      |                         |                     |                |
+-------------------+-------------------------+---------------------+----------------+
|   [facility]      |   [address]  | [place] [address] |   [phone] |
| LABORATORY        | [address]vd[address]                   |                     |                |
+-------------------+-------------------------+---------------------+----------------+
|   [facility] |   [address]        | [place] [address]  |                |
+-------------------+-------------------------+---------------------+----------------+
 
 CBC w/manual diff (2019  3:43 PM)
 
+----------------------+-------------------------+-------------------+-----------------+
| Component            | Value                   | Ref Range         | Performed At    |
+----------------------+-------------------------+-------------------+-----------------+
| WBC                  | 5.53Comment:            | 3.80 - 11.00 K/uL | [facility] |
+----------------------+-------------------------+-------------------+-----------------+
| RBC                  | 2.76 (L)                | 3.70 - 5.10 M/uL  | [facility] |
+----------------------+-------------------------+-------------------+-----------------+
| HGB                  | 9.4 (L)                 | 11.3 - 15.5 g/dL  | [facility] |
+----------------------+-------------------------+-------------------+-----------------+
| HCT                  | 28.4 (L)                | 34.0 - 46.0 %     | [facility] |
+----------------------+-------------------------+-------------------+-----------------+
| MCV                  | 102.9 (H)               | 80.0 - 100.0 fl   | [facility] |
+----------------------+-------------------------+-------------------+-----------------+
| MCH                  | 34.1 (H)                | 27.0 - 34.0 pg    | [facility] |
+----------------------+-------------------------+-------------------+-----------------+
| MCHC                 | 33.2                    | 32.0 - 35.5 g/dL  | [facility] |
+----------------------+-------------------------+-------------------+-----------------+
| RDW SD               | 61.3 (H)                | 37 - 53 fl        | [facility] |
+----------------------+-------------------------+-------------------+-----------------+
| PLT                  | 147 (L)Comment:         | 150 - 400 K/uL    | [facility] |
+----------------------+-------------------------+-------------------+-----------------+
| MPV                  | 9.3Comment:             | fl                | [facility] |
+----------------------+-------------------------+-------------------+-----------------+
| DIFF TYPE            | MANUAL                  |                   | [facility] |
+----------------------+-------------------------+-------------------+-----------------+
| Neutrophils Manual   | 64                      | %                 | [facility] |
+----------------------+-------------------------+-------------------+-----------------+
| Lymphocytes Manual   | 28                      | %                 | [facility] |
+----------------------+-------------------------+-------------------+-----------------+
| Monocytes Manual     | 8                       | %                 | [facility] |
+----------------------+-------------------------+-------------------+-----------------+
| Neutrophils Absolute | 3.54                    | 1.90 - 7.40 K/uL  | [facility] |
+----------------------+-------------------------+-------------------+-----------------+
| Lymphocytes Absolute | 1.55                    | 1.00 - 3.90 K/uL  | [facility] |
+----------------------+-------------------------+-------------------+-----------------+
| Monocytes Absolute   | 0.44                    | 0.00 - 0.80 K/uL  | [facility] |
+----------------------+-------------------------+-------------------+-----------------+
| MORPHOLOGY           | 1+Comment: ANISONORMAL  |                   | [facility] |
|                      | PLT MORPHTesting        |                   |                 |
|                      | performed at [facility] [place]    |                   |                 |
|                      | [address];[place], [place]  |                   |                 |
|                      | [id number]                   |                   |                 |
|                      |                         |                   |                 |
+----------------------+-------------------------+-------------------+-----------------+
 
 
 
+-------------------+
| Specimen          |
+-------------------+
| Blood - Arm, Left |
+-------------------+
 
 
 
+-------------------+------------------+--------------------+--------------+
| Performing        | Address          | City/State/Zipcode | Phone Number |
| Organization      |                  |                    |              |
 
+-------------------+------------------+--------------------+--------------+
|   [facility] |   [address] | [place] [address] |              |
+-------------------+------------------+--------------------+--------------+
 US lower extremty  arterial right (2019  2:23 PM)
 
+------------------------------------------------------------------------+--------------+
| Impressions                                                            | Performed At |
+------------------------------------------------------------------------+--------------+
|   1. Right leg shows biphasic inflow with a greater than 50% stenosis  |   [facility]     |
| at  the origin of the superficial femoral artery                       | RADIOLOGY    |
| (137-309).    Biphasic  outflow.  2. Two vessel runoff to the right    |              |
| foot.  Signed by: Eugenio Hoffman  Sign Date/Time: 2019 3:11 PM  |              |
+------------------------------------------------------------------------+--------------+
 
 
 
+------------------------------------------------------------------------+--------------+
| Narrative                                                              | Performed At |
+------------------------------------------------------------------------+--------------+
|   LOWER EXTREMITY ARTERIAL EXAM WITH IMAGING  CLINICAL INFORMATION:    |   [facility]     |
| The patient is a 68-year-old female presenting to the vascular lab     | RADIOLOGY    |
| with  complaints of right foot nonhealing wound.    We have been asked |              |
|  to  evaluate for peripheral arterial disease.  COMPARISON:  None      |              |
| PROCEDURE:  Imaging of the right lower extremity arteries was          |              |
| performed using both  color Doppler and spectral Doppler techniques    |              |
| with a 9 megahertz linear  array transducer.  FINDINGS:  RIGHT  CFA    |              |
| prox 137 biphasic  DFA prox 71 biphasic  SFA prox 309 biphasic  SFA    |              |
| mid 91 biphasic  SFA distal 127 biphasic  POP mid 96 biphasic  GIL     |              |
| prox 69 biphasic  GIL distal 145 biphasic  PTA prox 50 biphasic  PTA   |              |
| distal 58 biphasic  WINIFRED prox 74 biphasic  WINIFRED distal 0    absent     |              |
+------------------------------------------------------------------------+--------------+
 
 
 
+------------------------------------------------------------------------------------------+
| Procedure Note                                                                           |
+------------------------------------------------------------------------------------------+
|   Lauro Baldwin Results In - 2019  3:14 PM PST  LOWER EXTREMITY ARTERIAL EXAM WITH      |
| IMAGINGCLINICAL INFORMATION:The patient is a 68-year-old female presenting to the        |
| vascular lab withcomplaints of right foot nonhealing wound.  We have been asked          |
| toevaluate for peripheral arterial disease.COMPARISON:NonePROCEDURE:Imaging of the right |
|  lower extremity arteries was performed using bothcolor Doppler and spectral Doppler     |
| techniques with a 9 megahertz lineararray transducer.FINDINGS:RIGHTCFA prox 137          |
| biphasicDFA prox 71 biphasicSFA prox 309 biphasicSFA mid 91 biphasicSFA distal 127       |
| biphasicPOP mid 96 biphasicATA prox 69 biphasicATA distal 145 biphasicPTA prox 50        |
| biphasicPTA distal 58 biphasicPERA prox 74 biphasicPERA distal 0  absentIMPRESSION:1.    |
| Right leg shows biphasic inflow with a greater than 50% stenosis atthe origin of the     |
| superficial femoral artery (137-309).  Biphasicoutflow.2. Two vessel runoff to the right |
|  foot.Signed by: Jamilah Hoffman Date/Time: 2019 3:11 PM                       |
|RIGHT                                                                                    |
|CFA prox 137 biphasic                                                                     |
|DFA prox 71 biphasic                                                                      |
|SFA prox 309 biphasic                                                                     |
|SFA mid 91 biphasic                                                                       |
|SFA distal 127 biphasic                                                                   |
|POP mid 96 biphasic                                                                       |
|GIL prox 69 biphasic                                                                      |
|GIL distal 145 biphasic                                                                   |
|PTA prox 50 biphasic                                                                      |
|PTA distal 58 biphasic                                                                    |
 
|WINIFRED prox 74 biphasic                                                                     |
|WINIFRED distal 0  absent                                                                     |
|IMPRESSION:                                                                              |
|1. Right leg shows biphasic inflow with a greater than 50% stenosis at                    |
|the origin of the superficial femoral artery (137-309).  Biphasic                         |
|outflow.                                                                                 |
|2. Two vessel runoff to the right foot.                                                   |
|Signed by: Eugenio Hoffman                                                                |
|Sign Date/Time: 2019 3:11 PM                                                        |
+------------------------------------------------------------------------------------------+
 
 
 
+--------------------+------------------+--------------------+--------------+
| Performing         | Address          | City/State/Zipcode | Phone Number |
| Organization       |                  |                    |              |
+--------------------+------------------+--------------------+--------------+
|   [facility] |   [address] | [place] [address] |              |
+--------------------+------------------+--------------------+--------------+
 ED INFORMATION EXCHANGE (2019  1:03 PM)
 
+------------------------------------------------------------------------+--------------+
| Narrative                                                              | Performed At |
+------------------------------------------------------------------------+--------------+
|   [id number]     Upcoming Changes to the [facility]     |   ED         |
| Notification  On 2019 the layout of this notification will | INFORMATION  |
|  be updated. For an overview of upcoming changes, please log into      | EXCHANGE     |
| [email or web address]. For questions,       |              |
| please email [email or web address] or call [phone].     |              |
|  This patient has registered at the [facility]     |              |
| Emergency Department   For more information visit:                     |              |
| [email or web address] |              |
| [id number]   Security Events  No recent Security Events currently on file  |              |
|     ED Care Guidelines from [facility] [place]  Last Updated: 18 |              |
|  10:16 AM         Other Information:  Currently being seen by Myranda |              |
|  in [place] for mental health concerns.[phone]  These are       |              |
| guidelines and the provider should exercise clinical judgment when     |              |
| providing care.  Additional guidelines are also available online from  |              |
| the following facilities: [facility]   Recent Emergency      |              |
| Department Visit Summary  Date Facility City State Type Major Type     |              |
| Diagnoses or Chief Complaint   2019 [facility]       |              |
| [place] Emergency    Emergency       2019 [facility]    |              |
| [facility] [place] Emergency    Emergency          Chest Pain              |              |
| Nausea        Shortness of Breath        Chest pain, unspecified       |              |
|      Elevated blood-pressure reading, without diagnosis of             |              |
| hypertension        Presence of aortocoronary bypass graft             |              |
| Other specified postprocedural states      2019 [facility]  |              |
| Health [place] Emergency    Emergency          CHEST PAIN            |              |
| Abnormal levels of other serum enzymes        Personal history of      |              |
| other diseases of the circulatory system        Chest pain,            |              |
| unspecified      2019 [facility] [place]            |              |
| Emergency    Emergency          FISTULA BLEEDING        Hemorrhage due |              |
|  to vascular prosthetic devices, implants and grafts, initial          |              |
| encounter      Dec 22, 2018 [facility] [place]             |              |
| Emergency    Emergency          CHEST PAIN        Type 2 diabetes      |              |
| mellitus with hyperglycemia        Chest pain, unspecified      Nov    |              |
| 2018 [place] Emergency    Emergency    Chief |              |
|  Complaint: SOB      2018 [facility] [place]       |              |
| Emergency    Emergency          FALL        Unspecified fall, initial  |              |
| encounter        Dependence on renal dialysis        End stage renal   |              |
 
| disease      2018 [facility] [place]               |              |
| Emergency    Emergency          FALL        Essential (primary)        |              |
| hypertension        Type 2 diabetes mellitus with hyperglycemia        |              |
|  Type 2 diabetes mellitus with unspecified complications               |              |
| Unspecified fall, initial encounter        Headache      2018  |              |
| [place] Emergency    Emergency          -1.      |              |
| Dorsalgia, unspecified        -1. Painful micturition, unspecified     |              |
|      0. Frequency of micturition        1. Urinary tract infection,    |              |
| site not specified        6. Type 2 diabetes mellitus with             |              |
| hyperglycemia        7. Type 2 diabetes mellitus with diabetic chronic |              |
|  kidney disease        8. End stage renal disease        9. Dependence |              |
|  on renal dialysis        10. Shortness of breath        11. Long term |              |
|  (current) use of anticoagulants            E.D. Visit Count (12 mo.)  |              |
|  Facility Visits Low Acuity   [facility] 17 0   [facility]        |              |
| [facility] 2 0   [facility] 3 0   Total   |              |
| 22 0   Note: Visits indicate total known visits. Medicaid Low Acuity   |              |
| Dx are the number of primary diagnoses on the Medicaid's Low Acuity dx |              |
|  list.         Recent Inpatient Visit Summary  Date Facility City      |              |
| State Type Major Type Diagnoses or Chief Complaint   Dec 27, 2018      |              |
| [facility]. [place] Recovery    Inpatient                   |              |
|     Peripheral arterial disease, osteomyelitis left foot        Other  |              |
| acute osteomyelitis, left ankle and foot        Long term (current)    |              |
| use of antibiotics        End stage renal disease        Anemia in     |              |
| chronic kidney disease      Dec 5, 2018 [facility] |              |
|  General Medicine    Inpatient          Peripheral vascular disease,   |              |
| unspecified        End stage renal disease        Dependence on renal  |              |
| dialysis        Long term (current) use of insulin        Other        |              |
| chronic osteomyelitis, left ankle and foot        Type 2 diabetes      |              |
| mellitus with diabetic chronic kidney disease        Essential         |              |
| (primary) hypertension      2018 [facility].   |              |
| [place] Inpatient    Inpatient          Upper GIB        Other chronic      |              |
| osteomyelitis, unspecified ankle and foot        End stage renal       |              |
| disease        Other specified personal risk factors, not elsewhere    |              |
| classified        Chronic systolic (congestive) heart failure          |              |
| Anemia in chronic kidney disease        Other disorders of             |              |
| plasma-protein metabolism, not elsewhere classified        Moderate    |              |
| protein-calorie malnutrition        Other disorders of phosphorus      |              |
| metabolism      [facility] [place] [place] [facility]     |              |
| Surgical    Inpatient          1. Type 2 diabetes mellitus with other  |              |
| specified complication        2. Urinary tract infection, site not     |              |
| specified        3. Unspecified protein-calorie malnutrition        4. |              |
|  Other chronic osteomyelitis, unspecified site        5. Hypertensive  |              |
| heart and chronic kidney disease with heart failure and with stage 5   |              |
| chronic kidney disease, or end stage renal disease        6. Chronic   |              |
| diastolic (congestive) heart failure        7. Other osteomyelitis,    |              |
| ankle and foot        8. Type 2 diabetes mellitus with foot ulcer      |              |
|      9. Atherosclerotic heart disease of native coronary artery        |              |
| without angina pectoris        10. Chronic obstructive pulmonary       |              |
| disease, unspecified      2018 [facility] [place]    |              |
| Medical Surgical    Inpatient          1. Benign paroxysmal vertigo,   |              |
| unspecified ear        2. End stage renal disease        3.            |              |
| Hypertensive chronic kidney disease with stage 5 chronic kidney        |              |
| disease or end stage renal disease        4. Urinary tract infection,  |              |
| site not specified        5. Hypertensive heart and chronic kidney     |              |
| disease with heart failure and with stage 5 chronic kidney disease, or |              |
|  end stage renal disease        6. Kidney transplant status        7.  |              |
| Unspecified systolic (congestive) heart failure        8. Syncope and  |              |
| collapse        9. Type 2 diabetes mellitus with diabetic chronic      |              |
| kidney disease        10. Atherosclerotic heart disease of native      |              |
| coronary artery without angina pectoris            Prescription Drug   |              |
 
| Report (12 Mo.)  PDMP query found no report.     Care Providers        |              |
| Provider PRC Type Phone Fax Service Dates   HEADINGS, SANTIAGO, F.N.P.     |              |
| Nurse Practitioner: Family     Current      [facility] Case        |              |
| Manager/Care Coordinator [phone]    2019 - Current       |              |
| [facility] Primary Care [phone]    2019 -            |              |
| Current      [facility]    ([phone])       |              |
| [phone] Current      [facility] Primary     |              |
| Care     Current      MANOLO GONZALEZ Primary Care     Current            |              |
| [employer] [facility] Provider [phone]     |              |
| Current      [facility] Case or Care Manager     Current      Willard   |              |
| MIRTA Sr Case or Care Manager [phone]     |              |
| [phone] Current      Jairo Gutierrez MD Other     Current           |              |
| Known Aliases  No known aliases.   Criteria Met         Care           |              |
| Guidelines      10 in 12      3 in 60     The above information is     |              |
| provided for the sole purpose of patient treatment. Use of this        |              |
| information beyond the terms of Data Sharing Memorandum of             |              |
| Understanding and License Agreement is prohibited. In certain cases    |              |
| not all visits may be represented.   Consult the aforementioned        |              |
| facilities for additional information.   2019 [employer]       |              |
| [employer] - [place], [place] -                              |              |
| [email or web address]                                         |              |
+------------------------------------------------------------------------+--------------+
 
 
 
+-------------------------------------------------------------------------------------------
--------------------------------------------------------------------------------------------
--------------------+
| Procedure Note                                                                            
                                                                                            
                    |
+-------------------------------------------------------------------------------------------
--------------------------------------------------------------------------------------------
--------------------+
|   Interface, Lab - 2019  1:04 PM PST  Formatting of this note may be different      
                                                                                            
                    |
| from the original.[id number] Changes to the Arnie             
                                                                                            
                    |
| NotificationOn 2019 the layout of this notification will be updated. For an   
                                                                                            
                    |
| overview of upcoming changes, please log into                                             
                                                                                            
                    |
| [email or web address]. For questions, please email             
                                                                                            
                    |
| [email or web address] or call [phone].This patient has registered at the   
                                                                                            
                    |
| [facility] Emergency Department For more information visit:           
                                                                                            
                    |
| [email or web address] Security     
                                                                                            
                    |
| EventsNo recent Security Events currently on fileED Care Guidelines from [employer] -       
                                                                                            
 
                    |
| [place]Last Updated: 18 10:16 AM  Other Information:Currently being seen by         
                                                                                            
                    |
| [facility] in [place] for mental health concerns.[phone] are guidelines and     
                                                                                            
                    |
| the provider should exercise clinical judgment when providing care.Additional guidelines  
                                                                                            
                    |
|  are also available online from the following facilities: [facility] Recent     
                                                                                            
                    |
| Emergency Department Visit SummaryDate Facility [facility] Type Major Type Diagnoses or   
                                                                                            
                    |
| Chief Complaint 2019 [facility] [place]. [place] Emergency  Emergency   [place]    
                                                                                            
                    |
[facility] [place] [place] [facility]    Chest Pain    Nausea      
                                                                                            
                    |
| Shortness of Breath    Chest pain, unspecified    Elevated blood-pressure reading,        
                                                                                            
                    |
| without diagnosis of hypertension    Presence of aortocoronary bypass graft    Other      
                                                                                            
                    |
| specified postprocedural states  2019 [facility] [place]. [place] Emergency    
                                                                                            
                    |
| Emergency    CHEST PAIN    Abnormal levels of other serum enzymes    Personal history of  
                                                                                            
                    |
|  other diseases of the circulatory system    Chest pain, unspecified  2019 [facility]    
                                                                                            
                    |
| [facility] [place]. [place] Emergency  Emergency    FISTULA BLEEDING    Hemorrhage due to   
                                                                                            
                    |
| vascular prosthetic devices, implants and grafts, initial encounter  Dec 22, 2018 [facility]    
                                                                                            
                    |
| [facility] [place]. [place] Emergency  Emergency    CHEST PAIN    Type 2 diabetes mellitus  
                                                                                            
                    |
|  with hyperglycemia    Chest pain, unspecified  2018 [facility] [place].   
                                                                                            
                    |
| [place] Emergency  Emergency  Chief Complaint: SOB  2018 [facility].   
                                                                                            
                    |
| [place] Emergency  Emergency    FALL    Unspecified fall, initial encounter    Dependence on   
                                                                                            
                    |
| renal dialysis    End stage renal disease  2018 [facility]. [place]    
                                                                                            
                    |
| Emergency  Emergency    FALL    Essential (primary) hypertension    Type 2 diabetes       
                                                                                            
 
                    |
| mellitus with hyperglycemia    Type 2 diabetes mellitus with unspecified complications    
                                                                                            
                    |
|   Unspecified fall, initial encounter    Headache  2018 [facility] LUC[place]       
                                                                                            
                    |
| [place]. [place] Emergency  Emergency    -1. Dorsalgia, unspecified    -1. Painful micturition,  
                                                                                            
                    |
|  unspecified    0. Frequency of micturition    1. Urinary tract infection, site not       
                                                                                            
                    |
| specified    6. Type 2 diabetes mellitus with hyperglycemia    7. Type 2 diabetes         
                                                                                            
                    |
| mellitus with diabetic chronic kidney disease    8. End stage renal disease    9.         
                                                                                            
                    |
| Dependence on renal dialysis    10. Shortness of breath    11. Long term (current) use    
                                                                                            
                    |
| of anticoagulants  E.D. Visit Count (12 mo.)Facility Visits [facility]      
                                                                                            
                    |
| Health 17 0 [facility] 2 0 [facility] 3 0 Total 22 0   
                                                                                            
                    |
| Note: Visits indicate total known visits. Medicaid Low Acuity Dx are the number of        
                                                                                            
                    |
| primary diagnoses on the Medicaid's Low Acuity dx list.  Recent Inpatient Visit           
                                                                                            
                    |
| SummaryDate Facility [facility] Type Major Type Diagnoses or Chief Complaint Dec 27,      
                                                                                            
                    |
|  [facility] [place]. [place] Recovery  Inpatient    Peripheral arterial disease,   
                                                                                            
                    |
| osteomyelitis left foot    Other acute osteomyelitis, left ankle and foot    Long term    
                                                                                            
                    |
| (current) use of antibiotics    End stage renal disease    Anemia in chronic kidney       
                                                                                            
                    |
| disease  Dec 5, 2018 [facility] [place]. [place] General Medicine  Inpatient           
                                                                                            
                    |
| Peripheral vascular disease, unspecified    End stage renal disease    Dependence on      
                                                                                            
                    |
| renal dialysis    Long term (current) use of insulin    Other chronic osteomyelitis,      
                                                                                            
                    |
| left ankle and foot    Type 2 diabetes mellitus with diabetic chronic kidney disease      
                                                                                            
                    |
| Essential (primary) hypertension  2018 [facility] [place]. [place] Inpatient   
                                                                                            
 
                    |
|  Inpatient    Upper GIB    Other chronic osteomyelitis, unspecified ankle and foot        
                                                                                            
                    |
| End stage renal disease    Other specified personal risk factors, not elsewhere           
                                                                                            
                    |
| classified    Chronic systolic (congestive) heart failure    Anemia in chronic kidney     
                                                                                            
                    |
| disease    Other disorders of plasma-protein metabolism, not elsewhere classified         
                                                                                            
                    |
| Moderate protein-calorie malnutrition    Other disorders of phosphorus metabolism  Nov    
                                                                                            
                    |
| 2018 [facility] [place]. [place] Medical Surgical  Inpatient    1. Type 2 diabetes  
                                                                                            
                    |
|  mellitus with other specified complication    2. Urinary tract infection, site not       
                                                                                            
                    |
| specified    3. Unspecified protein-calorie malnutrition    4. Other chronic              
                                                                                            
                    |
| osteomyelitis, unspecified site    5. Hypertensive heart and chronic kidney disease with  
                                                                                            
                    |
|  heart failure and with stage 5 chronic kidney disease, or end stage renal disease    6.  
                                                                                            
                    |
|  Chronic diastolic (congestive) heart failure    7. Other osteomyelitis, ankle and foot   
                                                                                            
                    |
|    8. Type 2 diabetes mellitus with foot ulcer    9. Atherosclerotic heart disease of     
                                                                                            
                    |
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 Chronic obstructive pulmonary       
                                                                                            
                    |
| disease, unspecified  2018 [facility] [place]. [place] Medical Surgical          
                                                                                            
                    |
| Inpatient    1. Benign paroxysmal vertigo, unspecified ear    2. End stage renal disease  
                                                                                            
                    |
|     3. Hypertensive chronic kidney disease with stage 5 chronic kidney disease or end     
                                                                                            
                    |
| stage renal disease    4. Urinary tract infection, site not specified    5. Hypertensive  
                                                                                            
                    |
|  heart and chronic kidney disease with heart failure and with stage 5 chronic kidney      
                                                                                            
                    |
| disease, or end stage renal disease    6. Kidney transplant status    7. Unspecified      
                                                                                            
                    |
| systolic (congestive) heart failure    8. Syncope and collapse    9. Type 2 diabetes      
                                                                                            
 
                    |
| mellitus with diabetic chronic kidney disease    10. Atherosclerotic heart disease of     
                                                                                            
                    |
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     
                                                                                            
                    |
| query found no report.Care [facility] Type Phone Fax Service Dates HEADINGS,   
                                                                                            
                    |
| ERNST HENDERSONP. Nurse Practitioner: Family   Current  [facility] /Care       
                                                                                            
                    |
| Coordinator [phone]  2019 - Current  [facility] [phone])      
                                                                                            
                    |
| [phone]  2019 - Current  [facility]  ([phone])        
                                                                                            
                    |
| [phone] Current  [facility] Primary Care   Current  MANOLO      
                                                                                            
                    |
| [facility] Primary Care   Current  [facility] Mental [facility] Provider [phone]  
                                                                                            
                    |
|  [phone] Current  [employer] Case or Care Manager   Current  Willard Crook -             
                                                                                            
                    |
| MIRTA Ortiz Case or Care Manager [phone] Current  Brenda,        
                                                                                            
                    |
| Jairo HARRIS MD Other   Current  Known AliasesNo known aliases. Criteria Met  Care          
                                                                                            
                    |
| Guidelines  10 in 12  3 in 60The above information is provided for the sole purpose of    
                                                                                            
                    |
| patient treatment. Use of this information beyond the terms of Data Sharing Memorandum    
                                                                                            
                    |
| of Understanding and License Agreement is prohibited. In certain cases not all visits     
                                                                                            
                    |
| may be represented. Consult the aforementioned facilities for additional information.     
                                                                                            
                    |
|  [employer]. [place], [place] -                         
                                                                                            
                    |
| [email or web address]                                                            
                                                                                            
                    |
|  Peripheral arterial disease, osteomyelitis left foot                                     
                                                                                            
                    |
|   Other acute osteomyelitis, left ankle and foot                                          
                                                                                            
                    |
|   Long term (current) use of antibiotics                                                  
                                                                                            
 
                    |
|   End stage renal disease                                                                 
                                                                                            
                    |
|   Anemia in chronic kidney disease                                                        
                                                                                            
                    |
|                                                                                           
                                                                                            
                    |
|[facility] [place] General Medicine  Inpatient                     
                                                                                            
                    |
|  Peripheral vascular disease, unspecified                                                 
                                                                                            
                    |
|   End stage renal disease                                                                 
                                                                                            
                    |
|   Dependence on renal dialysis                                                            
                                                                                            
                    |
|   Long term (current) use of insulin                                                      
                                                                                            
                    |
|   Other chronic osteomyelitis, left ankle and foot                                        
                                                                                            
                    |
|   Type 2 diabetes mellitus with diabetic chronic kidney disease                           
                                                                                            
                    |
|   Essential (primary) hypertension                                                        
                                                                                            
                    |
|                                                                                           
                                                                                            
                    |
|2018 [facility]. [place] Inpatient  Inpatient                           
                                                                                            
                    |
|  Upper GIB                                                                                
                                                                                            
                    |
|   Other chronic osteomyelitis, unspecified ankle and foot                                 
                                                                                            
                    |
|   End stage renal disease                                                                 
                                                                                            
                    |
|   Other specified personal risk factors, not elsewhere classified                         
                                                                                            
                    |
|   Chronic systolic (congestive) heart failure                                             
                                                                                            
                    |
|   Anemia in chronic kidney disease                                                        
                                                                                            
                    |
|   Other disorders of plasma-protein metabolism, not elsewhere classified                  
                                                                                            
 
                    |
|   Moderate protein-calorie malnutrition                                                   
                                                                                            
                    |
|   Other disorders of phosphorus metabolism                                                
                                                                                            
                    |
|                                                                                           
                                                                                            
                    |
|2018 [place]. [place] Medical Surgical  Inpatient                     
                                                                                            
                    |
|  1. Type 2 diabetes mellitus with other specified complication                            
                                                                                            
                    |
|   2. Urinary tract infection, site not specified                                          
                                                                                            
                    |
|   3. Unspecified protein-calorie malnutrition                                             
                                                                                            
                    |
|   4. Other chronic osteomyelitis, unspecified site                                        
                                                                                            
                    |
|   5. 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
onic kidney disease, or end stage renal disease                                             
                    |
|   6. Chronic diastolic (congestive) heart failure                                         
                                                                                            
                    |
|   7. Other osteomyelitis, ankle and foot                                                  
                                                                                            
                    |
|   8. Type 2 diabetes mellitus with foot ulcer                                             
                                                                                            
                    |
|   9. Atherosclerotic heart disease of native coronary artery without angina pectoris      
                                                                                            
                    |
|   10. Chronic obstructive pulmonary disease, unspecified                                  
                                                                                            
                    |
|                                                                                           
                                                                                            
                    |
|2018 [facility] [place] [facility]  Inpatient                      
                                                                                            
                    |
|  1. Benign paroxysmal vertigo, unspecified ear                                            
                                                                                            
                    |
|   2. End stage renal disease                                                              
                                                                                            
                    |
|   3. Hypertensive chronic kidney disease with stage 5 chronic kidney disease or end stage 
renal disease                                                                               
                    |
|   4. Urinary tract infection, site not specified                                          
                                                                                            
 
                    |
|   5. 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
onic kidney disease, or end stage renal disease                                             
                    |
|   6. Kidney transplant status                                                             
                                                                                            
                    |
|   7. Unspecified systolic (congestive) heart failure                                      
                                                                                            
                    |
|   8. Syncope and collapse                                                                 
                                                                                            
                    |
|   9. Type 2 diabetes mellitus with diabetic chronic kidney disease                        
                                                                                            
                    |
|   10. Atherosclerotic heart disease of native coronary artery without angina pectoris     
                                                                                            
                    |
|                                                                                           
                                                                                            
                    |
|                                                                                           
                                                                                            
                    |
|                                                                                           
                                                                                            
                    |
|Prescription Drug Report (12 Mo.)                                                          
                                                                                            
                    |
|PDMP query found no report.                                                                
                                                                                            
                    |
|                                                                                           
                                                                                            
                    |
|Care Providers                                                                             
                                                                                            
                    |
|Provider PRC Type Phone Fax Service Dates                                                  
                                                                                            
                    |
|HEADINGS, SANTIAGO, F.N.P. Nurse Practitioner: Family   Current                                
                                                                                            
                    |
|[facility] Antonio Martinez /Care Coordinator [phone]  2019 - Current         
                                                                                            
                    |
|[facility] [phone]  2019 - Current                          
                                                                                            
                    |
|[facility]  [phone] Current                         
                                                                                            
                    |
|[facility]   Current                                
                                                                                            
                    |
|[facility] Primary Care   Current                                                        
                                                                                            
 
                    |
|[employer] [facility] Mental Health Provider [phone] Current                 
                                                                                            
                    |
|[employer] Case or Care Manager   Current                                                   
                                                                                            
                    |
|MIRTA Goel Case or Care Manager [phone] Current
                                                                                            
                    |
|Jairo Gutierrez MD Other   Current                                                       
                                                                                            
                    |
|                                                                                           
                                                                                            
                    |
|Known Aliases                                                                              
                                                                                            
                    |
|No known aliases.                                                                          
                                                                                            
                    |
|Criteria Met                                                                               
                                                                                            
                    |
|                                                                                           
                                                                                            
                    |
|  Care Guidelines                                                                          
                                                                                            
                    |
|  10 in 12                                                                                 
                                                                                            
                    |
|  3 in 60                                                                                  
                                                                                            
                    |
|                                                                                           
                                                                                            
                    |
|The above information is provided for the sole purpose of patient treatment. Use of this in
formation beyond the terms of Data Sharing Memorandum of Understanding and License Agreement
 is prohibited. In  |
|certain cases not all visits may be represented. Consult the aforementioned facilities for 
additional information.                                                                     
                    |
|2019 [employer] - [place], [place] - [email or web address]                                                                                       
                    |
+-------------------------------------------------------------------------------------------
--------------------------------------------------------------------------------------------
--------------------+
 
 
 
+-------------------+---------+--------------------+--------------+
| Performing        | Address | City/State/Zipcode | Phone Number |
| Organization      |         |                    |              |
+-------------------+---------+--------------------+--------------+
|   ED INFORMATION  |         |                    |              |
 
| EXCHANGE          |         |                    |              |
+-------------------+---------+--------------------+--------------+
 in this encounter
 
 Visit Diagnoses
 
 
+-----------------------------------------------------------------------------------+
| Diagnosis                                                                         |
+-----------------------------------------------------------------------------------+
|   PVD (peripheral vascular disease) (HCC) - Primary                               |
+-----------------------------------------------------------------------------------+
|   Peripheral vascular disease, unspecified                                        |
+-----------------------------------------------------------------------------------+
|   ESRD (end stage renal disease) on dialysis (HCC)                                |
+-----------------------------------------------------------------------------------+
|   End stage renal disease                                                         |
+-----------------------------------------------------------------------------------+
|   Anemia in ESRD (end-stage renal disease) (HCC)                                  |
+-----------------------------------------------------------------------------------+
|   Anemia in chronic kidney disease                                                |
+-----------------------------------------------------------------------------------+
|   Hypoalbuminemia                                                                 |
+-----------------------------------------------------------------------------------+
|   Other disorders of plasma protein metabolism                                    |
+-----------------------------------------------------------------------------------+
|   Electrolyte imbalance risk                                                      |
+-----------------------------------------------------------------------------------+
|   Other specified conditions influencing health status                            |
+-----------------------------------------------------------------------------------+
|   ESRD (end stage renal disease) (HCC)                                            |
+-----------------------------------------------------------------------------------+
|   End stage renal disease                                                         |
+-----------------------------------------------------------------------------------+
|   CAD (coronary artery disease)                                                   |
+-----------------------------------------------------------------------------------+
|   Coronary atherosclerosis of unspecified type of vessel, native or graft         |
+-----------------------------------------------------------------------------------+
|   Paroxysmal atrial fibrillation (HCC)                                            |
+-----------------------------------------------------------------------------------+
|   Atrial fibrillation                                                             |
+-----------------------------------------------------------------------------------+
|   COPD (chronic obstructive pulmonary disease) (HCC)                              |
+-----------------------------------------------------------------------------------+
|   Chronic airway obstruction, not elsewhere classified                            |
+-----------------------------------------------------------------------------------+
|   Chronic systolic congestive heart failure (HCC)                                 |
+-----------------------------------------------------------------------------------+
|   Chronic systolic heart failure                                                  |
+-----------------------------------------------------------------------------------+
|   Hypertension, essential                                                         |
+-----------------------------------------------------------------------------------+
|   Unspecified essential hypertension                                              |
+-----------------------------------------------------------------------------------+
|   Type 2 diabetes mellitus with chronic kidney disease on chronic dialysis, with  |
| long-term current use of insulin (HCC)                                            |
+-----------------------------------------------------------------------------------+
 
 
 
 
 Admitting Diagnoses
 
 
+----------------------------------------------------+
| Diagnosis                                          |
+----------------------------------------------------+
|   PVD (peripheral vascular disease) (HCC)          |
+----------------------------------------------------+
|   Peripheral vascular disease, unspecified         |
+----------------------------------------------------+
|   ESRD (end stage renal disease) on dialysis (HCC) |
+----------------------------------------------------+
|   End stage renal disease                          |
+----------------------------------------------------+
 
 
 
 Administered Medications
 
 
+------------------+--------+---------+------+------+------+
| Medication Order | MAR    | Action  | Dose | Rate | Site |
|                  | Action | Date    |      |      |      |
+------------------+--------+---------+------+------+------+
 
 
 
+-----------------------------------+---+
|   acetaminophen (TYLENOL)         |   |
| suppository 650 mg  650 mg,       |   |
| Rectal, Every 6 Hours PRN, Mild   |   |
| Pain (1-3), Fever, Starting Wed   |   |
| 19 at 1910                   |   |
+-----------------------------------+---+
|                                   |   |
+-----------------------------------+---+
|   acetaminophen (TYLENOL) tablet  |   |
| 650 mg  650 mg, Oral, Every 6     |   |
| Hours PRN, Mild Pain (1-3),       |   |
| Fever, Starting Wed 19 at    |   |
| 1910                              |   |
+-----------------------------------+---+
|                                   |   |
+-----------------------------------+---+
 
 
 
+-----------------------------------+-------+----------+--------+---+---+
|   apixaban (ELIQUIS) tablet 2.5   | Given |  | 2.5 mg |   |   |
| mg  2.5 mg, Oral, 2 Times Daily,  |       | 9 14:01  |        |   |   |
| First dose on Thu 19 at 1200 |       | PST      |        |   |   |
+-----------------------------------+-------+----------+--------+---+---+
 
 
 
+-------+----------+--------+---+---+
| Given |  | 2.5 mg |   |   |
|       | 9 20:51  |        |   |   |
|       | PST      |        |   |   |
+-------+----------+--------+---+---+
 
| Given | 1/25/201 | 2.5 mg |   |   |
|       | 9 08:18  |        |   |   |
|       | PST      |        |   |   |
+-------+----------+--------+---+---+
 
 
 
+---+---+
|   |   |
+---+---+
 
 
 
+-----------------------------------+-------+----------+-------+---+---+
|   aspirin chewable tablet 81 mg   | Given |  | 81 mg |   |   |
| 81 mg, Oral, Daily With           |       | 9 10:01  |       |   |   |
| Breakfast, First dose on Fri      |       | PST      |       |   |   |
| 19 at 0930                   |       |          |       |   |   |
+-----------------------------------+-------+----------+-------+---+---+
 
 
 
+---+---+
|   |   |
+---+---+
 
 
 
+-----------------------------------+-------+----------+-------+---+---+
|   aspirin EC tablet 81 mg  81 mg, | Given |  | 81 mg |   |   |
|  Oral, Daily With Breakfast,      |       | 9 14:00  |       |   |   |
| First dose on u 19 at 0800 |       | PST      |       |   |   |
+-----------------------------------+-------+----------+-------+---+---+
 
 
 
+-------+----------+-------+---+---+
| Given |  | 81 mg |   |   |
|       | 9 08:18  |       |   |   |
|       | PST      |       |   |   |
+-------+----------+-------+---+---+
 
 
 
+---+---+
|   |   |
+---+---+
 
 
 
+-----------------------------------+-------+----------+-------+---+---+
|   atorvastatin (LIPITOR) tablet   | Given |  | 40 mg |   |   |
| 40 mg  40 mg, Oral, Nightly,      |       | 9 21:59  |       |   |   |
| First dose on Wed 19 at 2200 |       | PST      |       |   |   |
+-----------------------------------+-------+----------+-------+---+---+
 
 
 
+-------+----------+-------+---+---+
| Given |  | 40 mg |   |   |
 
|       | 9 20:51  |       |   |   |
|       | PST      |       |   |   |
+-------+----------+-------+---+---+
 
 
 
+---+---+
|   |   |
+---+---+
 
 
 
+----------------------------------+-------+----------+--------+---+---+
|   calcium acetate (PHOSLO)       | Given |  | 667 mg |   |   |
| capsule 667 mg  667 mg, Oral, 3  |       | 9 17:07  |        |   |   |
| Times Daily With Meals, First    |       | PST      |        |   |   |
| dose on 19 at 1930      |       |          |        |   |   |
+----------------------------------+-------+----------+--------+---+---+
 
 
 
+-------+----------+--------+---+---+
| Given |  | 667 mg |   |   |
|       | 9 08:17  |        |   |   |
|       | PST      |        |   |   |
+-------+----------+--------+---+---+
| Given |  | 667 mg |   |   |
|       | 9 12:33  |        |   |   |
|       | PST      |        |   |   |
+-------+----------+--------+---+---+
 
 
 
+---+---+
|   |   |
+---+---+
 
 
 
+-----------------------------------+-------+----------+---------+---+---+
|   carvedilol (COREG) tablet 12.5  | Given |  | 12.5 mg |   |   |
| mg  12.5 mg, Oral, 2 Times Daily  |       | 9 21:59  |         |   |   |
| With Meals, First dose on Wed     |       | PST      |         |   |   |
| 19 at 1930                   |       |          |         |   |   |
+-----------------------------------+-------+----------+---------+---+---+
 
 
 
+-------+----------+---------+---+---+
| Given |  | 12.5 mg |   |   |
|       | 9 17:07  |         |   |   |
|       | PST      |         |   |   |
+-------+----------+---------+---+---+
| Given |  | 12.5 mg |   |   |
|       | 9 08:17  |         |   |   |
|       | PST      |         |   |   |
+-------+----------+---------+---+---+
 
 
 
 
+-----------------------------------+---+
|                                   |   |
+-----------------------------------+---+
|   cefTAZidime (FORTAZ) 2 g in     |   |
| sodium chloride (IV) 0.9 % 50 mL  |   |
| IVPB  2 g, Intravenous,           |   |
| Administer over 30 Minutes, After |   |
|  Hemodialysis (see admin          |   |
| instructions), Starting Wed       |   |
| 19 at 1327                   |   |
+-----------------------------------+---+
|                                   |   |
+-----------------------------------+---+
 
 
 
+-----------------------------------+-------+----------+-----+-------+---+
|   cefTAZidime (FORTAZ) 2 g in     | Given |  | 2 g | 100   |   |
| sodium chloride (IV) 0.9 % 50 mL  |       | 9 20:55  |     | mL/hr |   |
| IVPB  2 g, Intravenous,           |       | PST      |     |       |   |
| Administer over 30 Minutes, Once, |       |          |     |       |   |
|  Wed 19 at 1930, For 1 dose  |       |          |     |       |   |
+-----------------------------------+-------+----------+-----+-------+---+
 
 
 
+---+---+
|   |   |
+---+---+
 
 
 
+-----------------------------------+-------+----------+--------+---+---+
|   cholecalciferol (VITAMIN D-3)   | Given |  | 5,000  |   |   |
| tablet 5,000 Units  5,000 Units,  |       | 9 21:58  | Units  |   |   |
| Oral, Daily, First dose on Wed    |       | PST      |        |   |   |
| 19 at 1930                   |       |          |        |   |   |
+-----------------------------------+-------+----------+--------+---+---+
 
 
 
+-------+----------+--------+---+---+
| Given |  | 5,000  |   |   |
|       | 9 14:00  | Units  |   |   |
|       | PST      |        |   |   |
+-------+----------+--------+---+---+
| Given |  | 5,000  |   |   |
|       | 9 08:18  | Units  |   |   |
|       | PST      |        |   |   |
+-------+----------+--------+---+---+
 
 
 
+---+---+
|   |   |
+---+---+
 
 
 
+-----------------------------------+-------+----------+-------+---+---+
 
|   clopidogrel (PLAVIX) tablet 75  | Given |  | 75 mg |   |   |
| mg  75 mg, Oral, Daily, First     |       | 9 14:01  |       |   |   |
| dose on Thu 19 at 0900       |       | PST      |       |   |   |
+-----------------------------------+-------+----------+-------+---+---+
 
 
 
+-------+----------+-------+---+---+
| Given |  | 75 mg |   |   |
|       | 9 08:17  |       |   |   |
|       | PST      |       |   |   |
+-------+----------+-------+---+---+
 
 
 
+---+---+
|   |   |
+---+---+
 
 
 
+----------------------------------+-------+----------+---------+---+---+
|   diphenhydrAMINE (BENADRYL)     | Given |  | 12.5 mg |   |   |
| injection 12.5 mg  12.5 mg,      |       | 9 09:56  |         |   |   |
| Intravenous, Every 6 Hours PRN,  |       | PST      |         |   |   |
| Allergic Reaction, Itching,      |       |          |         |   |   |
| Starting Fri 19 at 0842     |       |          |         |   |   |
+----------------------------------+-------+----------+---------+---+---+
 
 
 
+---+---+
|   |   |
+---+---+
 
 
 
+---------------------------------+-------+----------+---------+---+---+
|   epoetin byron (PROCRIT)        | Given |  | 10,000  |   |   |
| injection 10,000 Units  10,000  |       | 9 10:07  | Units   |   |   |
| Units, Intravenous, Once In     |       | PST      |         |   |   |
| Dialysis, Thu 19 at 0900,  |       |          |         |   |   |
| For 1 dose, DIALYSIS            |       |          |         |   |   |
+---------------------------------+-------+----------+---------+---+---+
 
 
 
+---+---+
|   |   |
+---+---+
 
 
 
+----------------------------------+-------+----------+-------+---+---+
|   famotidine (PEPCID) tablet 10  | Given |  | 10 mg |   |   |
| mg  10 mg, Oral, Daily, First    |       | 9 22:00  |       |   |   |
| dose on Wed 19 at 1930      |       | PST      |       |   |   |
+----------------------------------+-------+----------+-------+---+---+
 
 
 
 
+-------+----------+-------+---+---+
| Given |  | 10 mg |   |   |
|       | 9 14:01  |       |   |   |
|       | PST      |       |   |   |
+-------+----------+-------+---+---+
| Given |  | 10 mg |   |   |
|       | 9 08:17  |       |   |   |
|       | PST      |       |   |   |
+-------+----------+-------+---+---+
 
 
 
+-----------------------------------+---+
|                                   |   |
+-----------------------------------+---+
|   fentaNYL (SUBLIMAZE) injection  |   |
| 25 mcg  25 mcg, Intravenous,      |   |
| Every 1 Hour PRN, Moderate Pain   |   |
| (4-6), Starting 19 at    |   |
| 1910                              |   |
+-----------------------------------+---+
|                                   |   |
+-----------------------------------+---+
|   fentaNYL (SUBLIMAZE) injection  |   |
| 50 mcg  50 mcg, Intravenous,      |   |
| Every 1 Hour PRN, Severe Pain     |   |
| (7-10), Starting 19 at   |   |
| 1910                              |   |
+-----------------------------------+---+
|                                   |   |
+-----------------------------------+---+
 
 
 
+-----------------------------------+-------+----------+--------+---+---+
|   fentaNYL (SUBLIMAZE) injection  | Given |  | 50 mcg |   |   |
|  Once PRN, Starting 19   |       | 9 18:00  |        |   |   |
| at 1800, Intra-procedure          |       | PST      |        |   |   |
| (CATH/IR)                         |       |          |        |   |   |
+-----------------------------------+-------+----------+--------+---+---+
 
 
 
+---+---+
|   |   |
+---+---+
 
 
 
+-----------------------------------+-------+----------+--------+---+---+
|   heparin (porcine) 1000 UNIT/ML  | Given |  | 5,000  |   |   |
| injection  Once PRN, Starting Wed |       | 9 18:23  | Units  |   |   |
|  19 at 1823, Intra-procedure |       | PST      |        |   |   |
|  (CATH/IR)                        |       |          |        |   |   |
+-----------------------------------+-------+----------+--------+---+---+
 
 
 
+---+---+
 
|   |   |
+---+---+
 
 
 
+----------------------------------+-------+----------+----------+---+---+
|   HYDROcodone-acetaminophen      | Given |  | 1 tablet |   |   |
| (NORCO)  MG per tablet 1   |       | 9 11:09  |          |   |   |
| tablet  1 tablet, Oral, Every 4  |       | PST      |          |   |   |
| Hours PRN, Severe Pain (7-10),   |       |          |          |   |   |
| Starting Wed 19 at 1910     |       |          |          |   |   |
+----------------------------------+-------+----------+----------+---+---+
 
 
 
+-------+----------+----------+---+---+
| Given |  | 1 tablet |   |   |
|       | 9 20:51  |          |   |   |
|       | PST      |          |   |   |
+-------+----------+----------+---+---+
 
 
 
+---+---+
|   |   |
+---+---+
 
 
 
+----------------------------------+-------+----------+----------+---+---+
|   HYDROcodone-acetaminophen      | Given |  | 1 tablet |   |   |
| (NORCO) 5-325 MG per tablet 1    |       | 9 06:26  |          |   |   |
| tablet  1 tablet, Oral, Every 4  |       | PST      |          |   |   |
| Hours PRN, Moderate Pain (4-6),  |       |          |          |   |   |
| Starting Wed 19 at 1910     |       |          |          |   |   |
+----------------------------------+-------+----------+----------+---+---+
 
 
 
+-------+----------+----------+---+---+
| Given |  | 1 tablet |   |   |
|       | 9 09:56  |          |   |   |
|       | PST      |          |   |   |
+-------+----------+----------+---+---+
 
 
 
+---+---+
|   |   |
+---+---+
 
 
 
+-----------------------------------+-------+----------+--------+---+----------+
|   HYDROmorphone (DILAUDID)        | Given |  | 0.5 mg |   | Left Arm |
| injection 0.5 mg  0.5 mg,         |       | 9 15:55  |        |   |          |
| Intravenous, Once, Wed 19 at |       | PST      |        |   |          |
|  1508, For 1 dose                 |       |          |        |   |          |
+-----------------------------------+-------+----------+--------+---+----------+
 
 
 
 
+-----------------------------------+---+
|                                   |   |
+-----------------------------------+---+
|   HYDROmorphone (DILAUDID)        |   |
| injection 0.5 mg  0.5 mg,         |   |
| Intravenous, Every 3 Hours PRN,   |   |
| Moderate Pain (4-6), Starting Wed |   |
|  19 at 1910                  |   |
+-----------------------------------+---+
|                                   |   |
+-----------------------------------+---+
|   HYDROmorphone (DILAUDID)        |   |
| injection 1 mg  1 mg,             |   |
| Intravenous, Every 3 Hours PRN,   |   |
| Severe Pain (7-10), Starting Wed  |   |
| 19 at 1910                   |   |
+-----------------------------------+---+
|                                   |   |
+-----------------------------------+---+
 
 
 
+-----------------------------------+-------+----------+----------+---+---+
|   insulin glargine (LANTUS)       | Given |  | 15 Units |   |   |
| injection 15 Units  15 Units,     |       | 9 22:07  |          |   |   |
| Subcutaneous, Nightly, First dose |       | PST      |          |   |   |
|  on Wed 19 at 2200           |       |          |          |   |   |
+-----------------------------------+-------+----------+----------+---+---+
 
 
 
+-------+----------+----------+---+---+
| Given |  | 15 Units |   |   |
|       | 9 22:39  |          |   |   |
|       | PST      |          |   |   |
+-------+----------+----------+---+---+
 
 
 
+---+---+
|   |   |
+---+---+
 
 
 
+-----------------------------------+-------+----------+---------+---+---+
|   insulin lispro (human)          | Given |  | 1 Units |   |   |
| (HUMALOG) injection 0-3 Units     |       | 9 22:39  |         |   |   |
| 0-3 Units, Subcutaneous, Nightly, |       | PST      |         |   |   |
|  First dose on 19 at     |       |          |         |   |   |
| 2200                              |       |          |         |   |   |
+-----------------------------------+-------+----------+---------+---+---+
 
 
 
+---+---+
|   |   |
+---+---+
 
 
 
 
+-----------------------------------+-------+----------+---------+---+---+
|   insulin lispro (human)          | Given |  | 1 Units |   |   |
| (HUMALOG) injection 0-6 Units     |       | 9 17:12  |         |   |   |
| 0-6 Units, Subcutaneous, 3 Times  |       | PST      |         |   |   |
| Daily Before Meals, First dose on |       |          |         |   |   |
|  19 at 1930              |       |          |         |   |   |
+-----------------------------------+-------+----------+---------+---+---+
 
 
 
+-------+----------+---------+---+---+
| Given |  | 2 Units |   |   |
|       | 9 12:34  |         |   |   |
|       | PST      |         |   |   |
+-------+----------+---------+---+---+
 
 
 
+---+---+
|   |   |
+---+---+
 
 
 
+---------------------------------+-------+----------+--------+---+---+
|   iohexol (OMNIPAQUE 240) 240   | Given |  | 53 mLs |   |   |
| mg/mL injection 53 mL  53 mL,   |       | 9 18:34  |        |   |   |
| Intra-arterial, Img Once PRN,   |       | PST      |        |   |   |
| Other, Starting Wed 19 at  |       |          |        |   |   |
| 1853, For 1 dose                |       |          |        |   |   |
+---------------------------------+-------+----------+--------+---+---+
 
 
 
+---+---+
|   |   |
+---+---+
 
 
 
+-----------------------------------+-------+----------+-------+---+---+
|   isosorbide mononitrate (IMDUR)  | Given |  | 60 mg |   |   |
| 24 hr tablet 60 mg  60 mg, Oral,  |       | 9 14:02  |       |   |   |
| Daily, First dose on Thu 19  |       | PST      |       |   |   |
| at 0900                           |       |          |       |   |   |
+-----------------------------------+-------+----------+-------+---+---+
 
 
 
+-------+----------+-------+---+---+
| Given |  | 60 mg |   |   |
|       | 9 08:18  |       |   |   |
|       | PST      |       |   |   |
+-------+----------+-------+---+---+
 
 
 
 
+---+---+
|   |   |
+---+---+
 
 
 
+----------------------------------+-------+----------+--------+---+---+
|   lidocaine 1 % injection  Once  | Given |  | 10 mLs |   |   |
| PRN, During PCI per physician,   |       | 9 18:01  |        |   |   |
| Starting Wed 19 at 1801,    |       | PST      |        |   |   |
| Intra-procedure (CATH/IR)        |       |          |        |   |   |
+----------------------------------+-------+----------+--------+---+---+
 
 
 
+---+---+
|   |   |
+---+---+
 
 
 
+-----------------------------------+-------+----------+------+---+---+
|   LORazepam (ATIVAN) tablet 1 mg  | Given |  | 1 mg |   |   |
|  1 mg, Oral, Every 8 Hours PRN,   |       | 9 23:26  |      |   |   |
| Anxiety, Starting 19 at  |       | PST      |      |   |   |
| 1910                              |       |          |      |   |   |
+-----------------------------------+-------+----------+------+---+---+
 
 
 
+-------+----------+------+---+---+
| Given |  | 1 mg |   |   |
|       | 9 08:39  |      |   |   |
|       | PST      |      |   |   |
+-------+----------+------+---+---+
 
 
 
+---+---+
|   |   |
+---+---+
 
 
 
+----------------------------------+-------+----------+--------+---+---+
|   metroNIDAZOLE (FLAGYL) tablet  | Given |  | 500 mg |   |   |
| 500 mg  500 mg, Oral, Every 8    |       | 9 22:39  |        |   |   |
| Hours Scheduled (3 times per     |       | PST      |        |   |   |
| day), First dose on 19  |       |          |        |   |   |
| at 1400, Indications: Purulent   |       |          |        |   |   |
| Skin and Soft Tissue Infection   |       |          |        |   |   |
+----------------------------------+-------+----------+--------+---+---+
 
 
 
+-------+----------+--------+---+---+
| Given |  | 500 mg |   |   |
|       | 9 05:57  |        |   |   |
|       | PST      |        |   |   |
+-------+----------+--------+---+---+
 
| Given |  | 500 mg |   |   |
|       | 9 14:01  |        |   |   |
|       | PST      |        |   |   |
+-------+----------+--------+---+---+
 
 
 
+---+---+
|   |   |
+---+---+
 
 
 
+----------------------------------+-------+----------+------+---+---+
|   midazolam (VERSED) injection   | Given |  | 1 mg |   |   |
| Intravenous, Once PRN, Starting  |       | 9 18:00  |      |   |   |
| Wed 19 at 1800,             |       | PST      |      |   |   |
| Intra-procedure (CATH/IR)        |       |          |      |   |   |
+----------------------------------+-------+----------+------+---+---+
 
 
 
+-------+----------+------+---+---+
| Given | 1/23/201 | 1 mg |   |   |
|       | 9 18:23  |      |   |   |
|       | PST      |      |   |   |
+-------+----------+------+---+---+
 
 
 
+---+---+
|   |   |
+---+---+
 
 
 
+-----------------------------------+-------+----------+-------+---+---+
|   nortriptyline (PAMELOR) capsule | Given |  | 25 mg |   |   |
|  25 mg  25 mg, Oral, Every        |       | 9 08:18  |       |   |   |
| Morning, First dose on u        |       | PST      |       |   |   |
| 19 at 0900                   |       |          |       |   |   |
+-----------------------------------+-------+----------+-------+---+---+
 
 
 
+---+---+
|   |   |
+---+---+
 
 
 
+-----------------------------------+-------+----------+-------+---+---+
|   nortriptyline (PAMELOR) capsule | Given |  | 75 mg |   |   |
|  75 mg  75 mg, Oral, Nightly,     |       | 9 22:07  |       |   |   |
| First dose on 19 at 2200 |       | PST      |       |   |   |
+-----------------------------------+-------+----------+-------+---+---+
 
 
 
+-------+----------+-------+---+---+
 
| Given |  | 75 mg |   |   |
|       | 9 20:52  |       |   |   |
|       | PST      |       |   |   |
+-------+----------+-------+---+---+
 
 
 
+---+---+
|   |   |
+---+---+
 
 
 
+-----------------------------------+-------+----------+------+---+---+
|   ondansetron (ZOFRAN) injection  | Given |  | 4 mg |   |   |
| 4 mg  4 mg, Intravenous, Every 6  |       | 9 12:38  |      |   |   |
| Hours PRN, Nausea, Vomiting,      |       | PST      |      |   |   |
| Starting Wed 19 at 1910      |       |          |      |   |   |
+-----------------------------------+-------+----------+------+---+---+
 
 
 
+-------+----------+------+---+---+
| Given |  | 4 mg |   |   |
|       | 9 22:39  |      |   |   |
|       | PST      |      |   |   |
+-------+----------+------+---+---+
 
 
 
+-----------------------------------+---+
|                                   |   |
+-----------------------------------+---+
|   ondansetron (ZOFRAN-ODT)        |   |
| disintegrating tablet 4 mg  4 mg, |   |
|  Oral, Every 6 Hours PRN, Nausea, |   |
|  Vomiting, Starting 19   |   |
| at 1910                           |   |
+-----------------------------------+---+
|                                   |   |
+-----------------------------------+---+
 
 
 
+-----------------------------------+-------+----------+--------+---+---+
|   oxybutynin (DITROPAN) tablet    | Given |  | 2.5 mg |   |   |
| 2.5 mg  2.5 mg, Oral, 2 Times     |       | 9 21:59  |        |   |   |
| Daily, First dose on 19  |       | PST      |        |   |   |
| at 2100                           |       |          |        |   |   |
+-----------------------------------+-------+----------+--------+---+---+
 
 
 
+-------+----------+--------+---+---+
| Given |  | 2.5 mg |   |   |
|       | 9 20:51  |        |   |   |
|       | PST      |        |   |   |
+-------+----------+--------+---+---+
| Given |  | 2.5 mg |   |   |
|       | 9 08:18  |        |   |   |
 
|       | PST      |        |   |   |
+-------+----------+--------+---+---+
 
 
 
+---+---+
|   |   |
+---+---+
 
 
 
+-----------------------------------+-------+----------+-------+---+---+
|   pantoprazole (PROTONIX) EC      | Given |  | 40 mg |   |   |
| tablet 40 mg  40 mg, Oral, Every  |       | 9 06:26  |       |   |   |
| Morning Before Breakfast, First   |       | PST      |       |   |   |
| dose on Thu 19 at 0630       |       |          |       |   |   |
+-----------------------------------+-------+----------+-------+---+---+
 
 
 
+-------+----------+-------+---+---+
| Given |  | 40 mg |   |   |
|       | 9 05:57  |       |   |   |
|       | PST      |       |   |   |
+-------+----------+-------+---+---+
 
 
 
+---+---+
|   |   |
+---+---+
 
 
 
+-----------------------------------+-------+----------+---------+---+---+
|   rOPINIRole (REQUIP) tablet 0.75 | Given |  | 0.75 mg |   |   |
|  mg  0.75 mg, Oral, Nightly,      |       | 9 21:58  |         |   |   |
| First dose on 19 at 2200 |       | PST      |         |   |   |
+-----------------------------------+-------+----------+---------+---+---+
 
 
 
+-------+----------+---------+---+---+
| Given |  | 0.75 mg |   |   |
|       | 9 20:51  |         |   |   |
|       | PST      |         |   |   |
+-------+----------+---------+---+---+
 
 
 
+---+---+
|   |   |
+---+---+
 
 
 
+-----------------------------------+-------+----------+---+---+---+
|   silver sulfADIAZINE (SILVADENE) | Given |  |   |   |   |
|  1 % cream  Topical, 2 Times      |       | 9 13:00  |   |   |   |
| Daily, First dose on 19  |       | PST      |   |   |   |
 
| at 0930, On the right foot and    |       |          |   |   |   |
| cover with  telfa roll gauze and  |       |          |   |   |   |
| secure with  Tape.                |       |          |   |   |   |
+-----------------------------------+-------+----------+---+---+---+
 
 
 
+---+---+
|   |   |
+---+---+
 
 
 
+-----------------------------------+-------+----------+--------+---+---+
|   sodium chloride (PF) 0.9 %      | Given |  | 10 mLs |   |   |
| flush 10 mL  10 mL, Intravenous,  |       | 9 11:30  |        |   |   |
| Every 8 Hours, First dose on Wed  |       | PST      |        |   |   |
| 19 at 1930                   |       |          |        |   |   |
+-----------------------------------+-------+----------+--------+---+---+
 
 
 
+---+---+
|   |   |
+---+---+
 
 
 
+----------------------------------+---------+----------+-------+---+---+
|   vancomycin (VANCOCIN) 1500     | New Bag |  | 1.5 g |   |   |
| mg/250 mL IVPB  1.5 g,           |         | 9 18:12  |       |   |   |
| Intravenous, Administer over 90  |         | PST      |       |   |   |
| Minutes, Once, Wed 19 at    |         |          |       |   |   |
| 1432, For 1 dose                 |         |          |       |   |   |
+----------------------------------+---------+----------+-------+---+---+
 
 
 
+-----------------------------------+---+
|                                   |   |
+-----------------------------------+---+
|   vancomycin (VANCOCIN) 500 mg in |   |
|  sodium chloride (IV) 0.9 % 100   |   |
| mL IVPB  500 mg, Intravenous,     |   |
| Administer over 60 Minutes, See   |   |
| Admin Instructions, Starting Wed  |   |
| 19 at 1910, Administer dose  |   |
| during last hour or immediately   |   |
| following each Hemodialysis       |   |
| session. Use Rx button to message |   |
|  pharmacy for dose.               |   |
+-----------------------------------+---+
|                                   |   |
+-----------------------------------+---+
 
 
 
+-----------------------------------+-------+----------+--------+-------+---+
|   vancomycin (VANCOCIN) 500 mg in | Given |  | 500 mg | 100   |   |
|  sodium chloride (IV) 0.9 % 100   |       | 9 12:34  |        | mL/hr |   |
 
| mL IVPB  500 mg, Intravenous,     |       | PST      |        |       |   |
| Administer over 60 Minutes, Once, |       |          |        |       |   |
|  Fri 19 at 1230, For 1 dose  |       |          |        |       |   |
+-----------------------------------+-------+----------+--------+-------+---+
 
 
 
+---+---+
|   |   |
+---+---+
 in this encounter

## 2019-04-09 NOTE — XMS
Encounter Summary
  Created on: 2019
 
 Cherie Villalobos
 External Reference #: [id number]
 : 48
 Sex: Female
 
 Demographics
 
 
+-----------------------+--------------------------+
| Address               | [address]               |
|                       | [place]  [address] |
+-----------------------+--------------------------+
| Home Phone            | [phone]          |
+-----------------------+--------------------------+
| Preferred Language    | Unknown                  |
+-----------------------+--------------------------+
| Marital Status        |                   |
+-----------------------+--------------------------+
| Sabianism Affiliation | 1013                     |
+-----------------------+--------------------------+
| Race                  | Unknown                  |
+-----------------------+--------------------------+
| Ethnic Group          | Unknown                  |
+-----------------------+--------------------------+
 
 
 Author
 
 
+--------------+-----------------------+
| Author       | [facility] [employer] |
+--------------+-----------------------+
| Organization | [facility] |
+--------------+-----------------------+
| Address      | Unknown               |
+--------------+-----------------------+
| Phone        | Unavailable           |
+--------------+-----------------------+
 
 
 
 Support
 
 
+---------------+--------------+---------------------+-----------------+
| Name          | Relationship | Address             | Phone           |
+---------------+--------------+---------------------+-----------------+
| Anoop Villalobos | ECON         | [place], | [phone] |
|               |              |  [place]  [address]     |                 |
+---------------+--------------+---------------------+-----------------+
| Jennifer Dickson | ECON         | [place]       | [phone] |
|               |              | [phone]               |                 |
+---------------+--------------+---------------------+-----------------+
 
 
 
 
 Care Team Providers
 
 
+-----------------------+------+-----------------+
| Care Team Member Name | Role | Phone           |
+-----------------------+------+-----------------+
| Ivy Couch DO      | PCP  | [phone] |
+-----------------------+------+-----------------+
 
 
 
 Encounter Details
 
 
+--------+-------------+----------------------+---------------------+-------------+
| Date   | Type        | Department           | Care Team           | Description |
+--------+-------------+----------------------+---------------------+-------------+
| / | Orders Only |   [facility] |   uLisa Segovia  |             |
| 2019   |             |  and Ankle  [address]      | [address]               |             |
|        |             | [address]   |                     |             |
|        |             | [place]         |                     |             |
|        |             | [address]           |                     |             |
|        |             | [phone]         |                     |             |
+--------+-------------+----------------------+---------------------+-------------+
 
 
 
 Social History
 
 
+---------------+------------+-----------+--------+------------------+
| Tobacco Use   | Types      | Packs/Day | Years  | Date             |
|               |            |           | Used   |                  |
+---------------+------------+-----------+--------+------------------+
| Former Smoker | Cigarettes | 1         | 30     | Quit: 2004 |
+---------------+------------+-----------+--------+------------------+
 
 
 
+---------------------+---+---+---+
| Smokeless Tobacco:  |   |   |   |
| Never Used          |   |   |   |
+---------------------+---+---+---+
 
 
 
+------------------------------+
| Comments: quite smoking 2004 |
+------------------------------+
 
 
 
+-------------+-----------+---------+----------+
| Alcohol Use | Drinks/We | oz/Week | Comments |
|             | ek        |         |          |
+-------------+-----------+---------+----------+
| No          |           |         |          |
+-------------+-----------+---------+----------+
 
 
 
 
+------------------+---------------+
| Sex Assigned at  | Date Recorded |
| Birth            |               |
+------------------+---------------+
| Not on file      |               |
+------------------+---------------+
 as of this encounter
 
 Plan of Treatment
 
 
+--------+---------+-----------+----------------------+-------------+
| Date   | Type    | Specialty | Care Team            | Description |
+--------+---------+-----------+----------------------+-------------+
| 04/10/ | Office  | Podiatry  |   Srinivasan Jordan,  |             |
| 2019   | Visit   |           | DPM  [address], |             |
|        |         |           |  [address]  [place],  |             |
|        |         |           | [place] [address]             |             |
|        |         |           | [phone]         |             |
|        |         |           | [phone] (Fax)   |             |
+--------+---------+-----------+----------------------+-------------+
 as of this encounter
 
 Visit Diagnoses
 Not on filein this encounter

## 2019-04-09 NOTE — XMS
Encounter Summary
  Created on: 2019
 
 Cherie Villalobos
 External Reference #: [id number]
 : 48
 Sex: Female
 
 Demographics
 
 
+-----------------------+--------------------------+
| Address               | [address]               |
|                       | [place]  [address] |
+-----------------------+--------------------------+
| Home Phone            | [phone]          |
+-----------------------+--------------------------+
| Preferred Language    | Unknown                  |
+-----------------------+--------------------------+
| Marital Status        |                   |
+-----------------------+--------------------------+
| Jainism Affiliation | 1013                     |
+-----------------------+--------------------------+
| Race                  | Unknown                  |
+-----------------------+--------------------------+
| Ethnic Group          | Unknown                  |
+-----------------------+--------------------------+
 
 
 Author
 
 
+--------------+-----------------------+
| Author       | [facility] [employer] |
+--------------+-----------------------+
| Organization | [facility] |
+--------------+-----------------------+
| Address      | Unknown               |
+--------------+-----------------------+
| Phone        | Unavailable           |
+--------------+-----------------------+
 
 
 
 Support
 
 
+---------------+--------------+---------------------+-----------------+
| Name          | Relationship | Address             | Phone           |
+---------------+--------------+---------------------+-----------------+
| Anoop Villalobos | ECON         | [place], | [phone] |
|               |              |  [place]  [address]     |                 |
+---------------+--------------+---------------------+-----------------+
| Jennifer Dickson | ECON         | [place]       | [phone] |
|               |              | [phone]               |                 |
+---------------+--------------+---------------------+-----------------+
 
 
 
 
 Care Team Providers
 
 
+-----------------------+------+-----------------+
| Care Team Member Name | Role | Phone           |
+-----------------------+------+-----------------+
| Ivy Couch DO      | PCP  | [phone] |
+-----------------------+------+-----------------+
 
 
 
 Reason for Visit
 
 
+-----------+------------------------------------------------------------------+
| Reason    | Comments                                                         |
+-----------+------------------------------------------------------------------+
| Follow-up | Here today for follow up for right toes, left foot. States that  |
|           | there is a spot on the top of the amputation that hasn't fully   |
|           | closed up. Has been having a lot of phantom pain in the right    |
|           | foot. Has been taking tramadol. Pain 4/10 currently can get to   |
|           | 8/10.                                                            |
+-----------+------------------------------------------------------------------+
 
 
 
 Encounter Details
 
 
+--------+---------+----------------------+----------------------+----------------------+
| Date   | Type    | Department           | Care Team            | Description          |
+--------+---------+----------------------+----------------------+----------------------+
| / | Office  |   [facility] |   Srinivasan Jordan,  | Closed nondisplaced  |
| 2019   | Visit   |  [address] Ankle  [address]      | DPM  [address], | fracture of distal   |
|        |         | [address]   |  [address]  [place],  | phalanx of right     |
|        |         | [place], [place]         | [place] [address]             | great toe with       |
|        |         | [address]           | [phone]         | routine healing,     |
|        |         | [phone]         | [phone] (Fax)   | subsequent encounter |
|        |         |                      |                      |  (Primary Dx);       |
|        |         |                      |                      | Post-operative       |
|        |         |                      |                      | state; Open wound of |
|        |         |                      |                      |  toe, subsequent     |
|        |         |                      |                      | encounter            |
+--------+---------+----------------------+----------------------+----------------------+
 
 
 
 Social History
 
 
+---------------+------------+-----------+--------+------------------+
| Tobacco Use   | Types      | Packs/Day | Years  | Date             |
|               |            |           | Used   |                  |
+---------------+------------+-----------+--------+------------------+
| Former Smoker | Cigarettes | 1         | 30     | Quit: 2004 |
+---------------+------------+-----------+--------+------------------+
 
 
 
 
+---------------------+---+---+---+
| Smokeless Tobacco:  |   |   |   |
| Never Used          |   |   |   |
+---------------------+---+---+---+
 
 
 
+------------------------------+
| Comments: quite smoking  |
+------------------------------+
 
 
 
+-------------+-----------+---------+----------+
| Alcohol Use | Drinks/We | oz/Week | Comments |
|             | ek        |         |          |
+-------------+-----------+---------+----------+
| No          |           |         |          |
+-------------+-----------+---------+----------+
 
 
 
+------------------+---------------+
| Sex Assigned at  | Date Recorded |
| Birth            |               |
+------------------+---------------+
| Not on file      |               |
+------------------+---------------+
 as of this encounter
 
 Last Filed Vital Signs
 
 
+-------------------+---------+-------------------------+
| Vital Sign        | Reading | Time Taken              |
+-------------------+---------+-------------------------+
| Blood Pressure    | 109/52  | 2019 11:53 AM PST |
+-------------------+---------+-------------------------+
| Pulse             | 71      | 2019 11:53 AM PST |
+-------------------+---------+-------------------------+
| Temperature       | -       | -                       |
+-------------------+---------+-------------------------+
| Respiratory Rate  | -       | -                       |
+-------------------+---------+-------------------------+
| Oxygen Saturation | 96%     | 2019 11:53 AM PST |
+-------------------+---------+-------------------------+
| Inhaled Oxygen    | -       | -                       |
| Concentration     |         |                         |
+-------------------+---------+-------------------------+
| Weight            | -       | -                       |
+-------------------+---------+-------------------------+
| Height            | -       | -                       |
+-------------------+---------+-------------------------+
| Body Mass Index   | -       | -                       |
+-------------------+---------+-------------------------+
 in this encounter
 
 Progress Notes
 Srinivasan Jordan, REBEKAH - 2019  1:45 PM PSTFormatting of this note may be different fro
m the original.
 
  
 Subjective: 
  Patient ID: Cherie Villalobos is a 69 y.o. female.
 Chief Complaint 
 Patient presents with 
   Follow-up 
   Here today for follow up for right toes, left foot. States that there is a spot on the to
p of the amputation that hasn't fully closed up. Has been having a lot of phantom pain in th
e right foot. Has been taking tramadol. Pain 4/10 currently can get to 8/10. 
  
 
 HPI
 Patient returns today follow-up right great toe wound right foot
 Transmetatarsal amputation left foot remains healed.  She is in the process of obtaining ne
w diabetic shoes and inserts.
 
 The following portions of the patient's history were reviewed and updated as appropriate an
d is available elsewhere in the record: allergies, current medications, past family history,
 past medical history, past social history, past surgical history and problem list.
 
 ROS
 Denies any nausea, vomiting, fever, chills, shortness of breath, chest pain, or calf pain 
 
     
 Objective: 
 /52  | Pulse 71  | SpO2 96% 
 General observation:
 Constitutional: The patient is alert and oriented to person, place and time
 Psychiatric: The patient has normal affect and mood; her speech is normal; her behavior is 
normal.
 Respiratory: Effort normal and breath sounds normal. No accessory muscle usage. No respirat
ory distress.   
 
 Lower Extremity Examination:
 Trans-metatarsal amputation left, site healing well, two very small superficial wounds
 Wound, irregular and linear dorsal right great toe, no erythema, mild edema of toe, improve
d
 Toenail right great is thickened, may come loose over time
 No skin mottling. No hyperesthesias or paresthesias.
 No calf or thigh pain. Negative Homans sign. 
 No pain on palpation of the interphalangeal joint of the hallux right dorsal
 
 Xr Toe Right 2 View
 
 Result Date: 2019
 No radiographic evidence of osteomyelitis seen.  If further assessment is warranted, consid
er correlation with MRI. Potential acute fracture through the base of the distal phalanx. Si
gned by: Gavin South Sign Date/Time: 2019 5:15 PM
 
 Us Lower Extremty  Arterial Right
 
 Result Date: 2019
 1. Right leg shows biphasic inflow with a greater than 50% stenosis at the origin of the dobbins
perficial femoral artery ([id number]).  Biphasic outflow. 2. Two vessel runoff to the right jeanne
t. Signed by: Eugenio Hoffman Sign Date/Time: 2019 3:11 PM
 
 Radiographs: .  See epic chart for complete read
 
 X-ray Foot Left
 
 
 Result Date: 2018
 Amputation of the left forefoot at the level of the proximal metatarsals. Surgical cutaneou
s staples are present. No radiographic evidence for osteomyelitis. Normal alignment of the h
indfoot. Mild degeneration of the midfoot. Electronically signed by Oleksandr Lemus MD on 2018 10:12 AM
 
 Ct Head Without Contrast
 
 Result Date: 2018
 1.  No acute intracranial pathology. Electronically signed by Steven Samano MD on  5:28 PM
 
 X-ray Chest 1 View
 
 Result Date: 2019
 1.  Small loculated pleural fluid collection seen overlying the lateral left heart border i
n the lower left chest.  There may also be a small pleural effusion at the right lung base w
hich is layering posteriorly. 2.  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 Electronically s
igned by Eugenio oHffman DO on 2019 4:59 PM
 
 Angioplasty 18:
 1. Aorta with narrow aortic bifurcation with moderate stenosis of proximal left common errol
c artery. 
 2. Left SFA with moderate stenosis proximally. 
 3. Single vessel runoff to left foot via left anterior tibial artery. 
 4. Left posterior tibial artery and peroneal artery were occluded with reconstitution of di
stal posterior tibial artery at the left ankle via collaterals. 
 5. If forefoot amputation does not heal, may need popliteal to posterior tibial artery bypa
ss versus antegrade access of left common femoral artery with posterior tibial artery recana
lization. 
  
 Electronically signed by Kirt Mclaughlin MD on 2018 10:51 AM 
 
    
 Assessment and Plan: 
 S/p: TMA left on 18
 DM, ESRD, PAD
 Open wound right great toe
 
 Rx mupirocin
 Continue use of surgical shoe right, removable cast boot left until diabetic shoes and inse
rts obtained
 Then transition to the use
 Follow-up in 1 month
 
 Srinivasan Jordan DPM
  in this encounter
 
 Plan of Treatment
 
 
+--------+---------+-----------+----------------------+-------------+
| Date   | Type    | Specialty | Care Team            | Description |
+--------+---------+-----------+----------------------+-------------+
| 04/10/ | Office  | Podiatry  |   Srinivasan Jordan,  |             |
|    | Visit   |           | DPM  [address] [facility], |             |
|        |         |           |  [address]  [place],  |             |
|        |         |           | [place] [address]             |             |
|        |         |           | [phone]         |             |
|        |         |           | [phone] (Fax)   |             |
 
+--------+---------+-----------+----------------------+-------------+
 as of this encounter
 
 Visit Diagnoses
 
 
+-----------------------------------------------------------------------------------+
| Diagnosis                                                                         |
+-----------------------------------------------------------------------------------+
|   Closed nondisplaced fracture of distal phalanx of right great toe with routine  |
| healing, subsequent encounter - Primary                                           |
+-----------------------------------------------------------------------------------+
|   Post-operative state                                                            |
+-----------------------------------------------------------------------------------+
|   Other postprocedural status                                                     |
+-----------------------------------------------------------------------------------+
|   Open wound of toe, subsequent encounter                                         |
+-----------------------------------------------------------------------------------+

## 2019-04-09 NOTE — XMS
Encounter Summary
  Created on: 2019
 
 Cherie Villalobos
 External Reference #: [id number]
 : 48
 Sex: Female
 
 Demographics
 
 
+-----------------------+--------------------------+
| Address               | [address]               |
|                       | [place]  [address] |
+-----------------------+--------------------------+
| Home Phone            | [phone]          |
+-----------------------+--------------------------+
| Preferred Language    | Unknown                  |
+-----------------------+--------------------------+
| Marital Status        |                   |
+-----------------------+--------------------------+
| Jainism Affiliation | 1013                     |
+-----------------------+--------------------------+
| Race                  | Unknown                  |
+-----------------------+--------------------------+
| Ethnic Group          | Unknown                  |
+-----------------------+--------------------------+
 
 
 Author
 
 
+--------------+-----------------------+
| Author       | [email or web address] |
+--------------+-----------------------+
| Organization | [facility] |
+--------------+-----------------------+
| Address      | Unknown               |
+--------------+-----------------------+
| Phone        | Unavailable           |
+--------------+-----------------------+
 
 
 
 Support
 
 
+---------------+--------------+---------------------+-----------------+
| Name          | Relationship | Address             | Phone           |
+---------------+--------------+---------------------+-----------------+
| Anoop Villalobos | ECON         | [place], | [phone] |
|               |              |  [place]  [address]     |                 |
+---------------+--------------+---------------------+-----------------+
| Jennifer Dickson | ECON         | [place]       | [phone] |
|               |              | [address]               |                 |
+---------------+--------------+---------------------+-----------------+
 
 
 
 
 Care Team Providers
 
 
+-----------------------+------+-----------------+
| Care Team Member Name | Role | Phone           |
+-----------------------+------+-----------------+
| Ivy Couch DO      | PCP  | [phone] |
+-----------------------+------+-----------------+
 
 
 
 Reason for Visit
 
 
+-------------------+----------+
| Reason            | Comments |
+-------------------+----------+
| Medication Refill |          |
+-------------------+----------+
 
 
 
 Encounter Details
 
 
+--------+--------+----------------------+----------------------+-------------+
| Date   | Type   | Department           | Care Team            | Description |
+--------+--------+----------------------+----------------------+-------------+
| / | Refill |   [facility]      |   João Asher MD  |             |
|    |        | [place]  [address]        |  [address]   |             |
|        |        | [address]   | [address]  [place], [place]    |             |
|        |        | [place] [address]   | [address]  [phone]  |             |
|        |        | [phone]         |  [phone] (Fax)  |             |
+--------+--------+----------------------+----------------------+-------------+
 
 
 
 Social History
 
 
+---------------+------------+-----------+--------+------------------+
| Tobacco Use   | Types      | Packs/Day | Years  | Date             |
|               |            |           | Used   |                  |
+---------------+------------+-----------+--------+------------------+
| Former Smoker | Cigarettes | 1         | 30     | Quit: 2004 |
+---------------+------------+-----------+--------+------------------+
 
 
 
+---------------------+---+---+---+
| Smokeless Tobacco:  |   |   |   |
| Never Used          |   |   |   |
+---------------------+---+---+---+
 
 
 
+------------------------------+
| Comments: quite smoking 2004 |
+------------------------------+
 
 
 
 
+-------------+-----------+---------+----------+
| Alcohol Use | Drinks/We | oz/Week | Comments |
|             | ek        |         |          |
+-------------+-----------+---------+----------+
| No          |           |         |          |
+-------------+-----------+---------+----------+
 
 
 
+------------------+---------------+
| Sex Assigned at  | Date Recorded |
| Birth            |               |
+------------------+---------------+
| Not on file      |               |
+------------------+---------------+
 as of this encounter
 
 Plan of Treatment
 
 
+--------+---------+-----------+----------------------+-------------+
| Date   | Type    | Specialty | Care Team            | Description |
+--------+---------+-----------+----------------------+-------------+
| 04/10/ | Office  | Podiatry  |   Srinivasan Jordan,  |             |
|    | Visit   |           | DPCLARE  [address], |             |
|        |         |           |  [address]  [place],  |             |
|        |         |           | [place] [address]             |             |
|        |         |           | [phone]         |             |
|        |         |           | [phone] (Fax)   |             |
+--------+---------+-----------+----------------------+-------------+
 as of this encounter
 
 Visit Diagnoses
 Not on filein this encounter

## 2019-04-09 NOTE — XMS
Encounter Summary
  Created on: 2019
 
 Cherie Villalobos
 External Reference #: [id number]
 : 48
 Sex: Female
 
 Demographics
 
 
+-----------------------+--------------------------+
| Address               | [address]               |
|                       | [place]  [address] |
+-----------------------+--------------------------+
| Home Phone            | [phone]          |
+-----------------------+--------------------------+
| Preferred Language    | Unknown                  |
+-----------------------+--------------------------+
| Marital Status        |                   |
+-----------------------+--------------------------+
| Pentec[employer]alism Affiliation | 1013                     |
+-----------------------+--------------------------+
| Race                  | Unknown                  |
+-----------------------+--------------------------+
| Ethnic Group          | Unknown                  |
+-----------------------+--------------------------+
 
 
 Author
 
 
+--------------+-----------------------+
| Author       | [employer] |
+--------------+-----------------------+
| Organization | [facility] |
+--------------+-----------------------+
| Address      | Unknown               |
+--------------+-----------------------+
| Phone        | Unavailable           |
+--------------+-----------------------+
 
 
 
 Support
 
 
+---------------+--------------+---------------------+-----------------+
| Name          | Relationship | Address             | Phone           |
+---------------+--------------+---------------------+-----------------+
| Anoop Villalobos | ECON         | [place], | [phone] |
|               |              |  [place]  [address]     |                 |
+---------------+--------------+---------------------+-----------------+
| Jennifer Dickson | ECON         | [place]       | [phone] |
|               |              | [address]               |                 |
+---------------+--------------+---------------------+-----------------+
 
 
 
 
 Care Team Providers
 
 
+-----------------------+------+-----------------+
| Care Team Member Name | Role | Phone           |
+-----------------------+------+-----------------+
| Ivy Couch DO      | PCP  | [phone] |
+-----------------------+------+-----------------+
 
 
 
 Reason for Visit
 
 
+--------+------------------------------------------------------+
| Reason | Comments                                             |
+--------+------------------------------------------------------+
| Other  | [facility] Note |
+--------+------------------------------------------------------+
 
 
 
 Encounter Details
 
 
+--------+-------------+----------------------+----------------------+----------------------
+
| Date   | Type        | Department           | Care Team            | Description          
|
+--------+-------------+----------------------+----------------------+----------------------
+
| / | Documentati |   [facility]      |   João Asher MD  | Other (December      
|
| 2019   | on Only     | [address]        |  [address]   | [facility] Provider 
|
|        |             | [address]   | [address]  [place], [place]    |  Dialysis Rounding   
|
|        |             | [place] [address]   | [id number] [phone]  | Note)                
|
|        |             | [phone]         |  [phone] (Fax)  |                      
|
+--------+-------------+----------------------+----------------------+----------------------
+
 
 
 
 Social History
 
 
+---------------+------------+-----------+--------+------------------+
| Tobacco Use   | Types      | Packs/Day | Years  | Date             |
|               |            |           | Used   |                  |
+---------------+------------+-----------+--------+------------------+
| Former Smoker | Cigarettes | 1         | 30     | Quit: 2004 |
+---------------+------------+-----------+--------+------------------+
 
 
 
+---------------------+---+---+---+
 
| Smokeless Tobacco:  |   |   |   |
| Never Used          |   |   |   |
+---------------------+---+---+---+
 
 
 
+------------------------------+
| Comments: quite smoking  |
+------------------------------+
 
 
 
+-------------+-----------+---------+----------+
| Alcohol Use | Drinks/We | oz/Week | Comments |
|             | ek        |         |          |
+-------------+-----------+---------+----------+
| No          |           |         |          |
+-------------+-----------+---------+----------+
 
 
 
+------------------+---------------+
| Sex Assigned at  | Date Recorded |
| Birth            |               |
+------------------+---------------+
| Not on file      |               |
+------------------+---------------+
 as of this encounter
 
 Plan of Treatment
 
 
+--------+---------+-----------+----------------------+-------------+
| Date   | Type    | Specialty | Care Team            | Description |
+--------+---------+-----------+----------------------+-------------+
| 04/10/ | Office  | Podiatry  |   Srinivasan Jordan,  |             |
|    | Visit   |           | DPM  [address], |             |
|        |         |           |  [address]  [place],  |             |
|        |         |           | [place] [address]             |             |
|        |         |           | [phone]         |             |
|        |         |           | [phone] (Fax)   |             |
+--------+---------+-----------+----------------------+-------------+
 as of this encounter
 
 Visit Diagnoses
 Not on filein this encounter

## 2019-04-09 NOTE — XMS
Encounter Summary
  Created on: 2019
 
 Cherie Villalobos
 External Reference #: [id number]
 : 48
 Sex: Female
 
 Demographics
 
 
+-----------------------+--------------------------+
| Address               | [address]               |
|                       | [place]  [address] |
+-----------------------+--------------------------+
| Home Phone            | [phone]          |
+-----------------------+--------------------------+
| Preferred Language    | Unknown                  |
+-----------------------+--------------------------+
| Marital Status        |                   |
+-----------------------+--------------------------+
| Adventism Affiliation | [id number]                     |
+-----------------------+--------------------------+
| Race                  | Unknown                  |
+-----------------------+--------------------------+
| Ethnic Group          | Unknown                  |
+-----------------------+--------------------------+
 
 
 Author
 
 
+--------------+-----------------------+
| Author       | Sherry HelloNature |
+--------------+-----------------------+
| Organization | [facility] [employer] |
+--------------+-----------------------+
| Address      | Unknown               |
+--------------+-----------------------+
| Phone        | Unavailable           |
+--------------+-----------------------+
 
 
 
 Support
 
 
+---------------+--------------+---------------------+-----------------+
| Name          | Relationship | Address             | Phone           |
+---------------+--------------+---------------------+-----------------+
| Anoop Villalobos | ECON         | [place], | [phone] |
|               |              |  [place]  [address]     |                 |
+---------------+--------------+---------------------+-----------------+
| Jennifer Dickson | ECON         | [place]       | [phone] |
|               |              | [id number]               |                 |
+---------------+--------------+---------------------+-----------------+
 
 
 
 
 Care Team Providers
 
 
+-----------------------+------+-----------------+
| Care Team Member Name | Role | Phone           |
+-----------------------+------+-----------------+
| Ivy Couch DO      | PCP  | [phone] |
+-----------------------+------+-----------------+
 
 
 
 Reason for Visit
 
 
+---------------------+----------+
| Reason              | Comments |
+---------------------+----------+
| Chest Pain          |          |
+---------------------+----------+
| Shortness of Breath |          |
+---------------------+----------+
| Nausea              |          |
+---------------------+----------+
 Auth/Cert
 
+--------+--------+-----------+--------------+--------------+---------------+
| Status | Reason | Specialty | Diagnoses /  | Referred By  | Referred To   |
|        |        |           | Procedures   | Contact      | Contact       |
+--------+--------+-----------+--------------+--------------+---------------+
|        |        | Internal  |   Diagnoses  |              |   [facility]    |
|        |        | Medicine  |  Elevated    |              | [address]   |
|        |        |           | blood        |              | [address]
|        |        |           | pressure     |              |  [address]   |
|        |        |           | reading  S/P |              | [place], [place]  |
|        |        |           |  MVR (mitral |              | [phone]  Phone: |
|        |        |           |  valve       |              |  [phone] |
|        |        |           | repair)  Hx  |              |   Fax:        |
|        |        |           | of CABG      |              | [phone]  |
|        |        |           | Chest pain   |              |               |
|        |        |           | in adult     |              |               |
|        |        |           |              |              |               |
+--------+--------+-----------+--------------+--------------+---------------+
 
 
 
 
 Encounter Details
 
 
+--------+-----------+----------------------+----------------------+----------------------+
| Date   | Type      | Department           | Care Team            | Description          |
+--------+-----------+----------------------+----------------------+----------------------+
| / | Emergency |   [facility]    |   Nathaniel De Luna, | Hx of CABG (Primary  |
| 2019 - |           | [facility]   |  DO  [address]  | Dx); Chest pain in   |
|        |           | [facility] |  EMERGENCY           | adult; S/P MVR       |
| / |           |   [address]     | DEPARTMENT           | (mitral valve        |
|    |           | [place] [address]   | [place] [address]   | repair); Elevated    |
|        |           | [phone]         | [phone]         | blood pressure       |
|        |           |                      | [phone] (Fax)   | reading; Chronic     |
 
|        |           |                      | Negro Lr, DO    | systolic congestive  |
|        |           |                      | [address]  [address]  | heart failure (HCC); |
|        |           |                      | [address]           |  Chest pain,         |
|        |           |                      | [place] [address]   | unspecified type     |
|        |           |                      | [phone]         |                      |
|        |           |                      | [phone] (Fax)   |                      |
|        |           |                      | Silas Ferrara MD  [address]                      |
|        |           |                      |  [address]  [address]     |                      |
|        |           |                      | [address]           |                      |
|        |           |                      | [place] [address]   |                      |
|        |           |                      | [phone]         |                      |
|        |           |                      | [phone] (Fax)   |                      |
+--------+-----------+----------------------+----------------------+----------------------+
 
 
 
 Social History
 
 
+---------------+------------+-----------+--------+------------------+
| Tobacco Use   | Types      | Packs/Day | Years  | Date             |
|               |            |           | Used   |                  |
+---------------+------------+-----------+--------+------------------+
| Former Smoker | Cigarettes | 1         | 30     | Quit: 2004 |
+---------------+------------+-----------+--------+------------------+
 
 
 
+---------------------+---+---+---+
| Smokeless Tobacco:  |   |   |   |
| Never Used          |   |   |   |
+---------------------+---+---+---+
 
 
 
+------------------------------+
| Comments: quite smoking  |
+------------------------------+
 
 
 
+-------------+-----------+---------+----------+
| Alcohol Use | Drinks/We | oz/Week | Comments |
|             | ek        |         |          |
+-------------+-----------+---------+----------+
| No          |           |         |          |
+-------------+-----------+---------+----------+
 
 
 
+------------------+---------------+
| Sex Assigned at  | Date Recorded |
| Birth            |               |
+------------------+---------------+
| Not on file      |               |
+------------------+---------------+
 as of this encounter
 
 Last Filed Vital Signs
 
 
 
+-------------------+----------------------+-------------------------+
| Vital Sign        | Reading              | Time Taken              |
+-------------------+----------------------+-------------------------+
| Blood Pressure    | 108/56               | 2019 11:46 AM PST |
+-------------------+----------------------+-------------------------+
| Pulse             | 68                   | 2019 11:46 AM PST |
+-------------------+----------------------+-------------------------+
| Temperature       | 36.5   C (97.7   F)  | 2019 11:46 AM PST |
+-------------------+----------------------+-------------------------+
| Respiratory Rate  | 20                   | 2019 11:46 AM PST |
+-------------------+----------------------+-------------------------+
| Oxygen Saturation | 92%                  | 2019 11:46 AM PST |
+-------------------+----------------------+-------------------------+
| Inhaled Oxygen    | -                    | -                       |
| Concentration     |                      |                         |
+-------------------+----------------------+-------------------------+
| Weight            | 65.5 kg (144 lb 6.4  | 2019  3:16 AM PST |
|                   | oz)                  |                         |
+-------------------+----------------------+-------------------------+
| Height            | 177.8 cm (5' 10")    | 2019  1:52 PM PST |
+-------------------+----------------------+-------------------------+
| Body Mass Index   | 20.72                | 2019  3:16 AM PST |
+-------------------+----------------------+-------------------------+
 in this encounter
 
 Discharge Summaries
 Silas Ferrara MD - 2019  8:20 AM PSTFormatting of this note may be different from the 
original.
 [facility]
 Service:  Hospitalist
 Discharge Summary
 
 Date of Admission:  2019
 Date of Discharge:   2019
 
 Discharge Physician:  Silas Ferrara MD
 Treatment Team: 
 Admitting Provider: Negro Lr DO
 Discharge Diagnoses:   
 
 Principal Problem:
   Chest pain
 Active Problems:
   Coronary artery disease involving native coronary artery of native heart without angina p
ectoris
   ESRD (end stage renal disease) ([facility])
   Type 2 diabetes mellitus with chronic kidney disease on chronic dialysis, with long-term 
current use of insulin (HCC)
   Anemia in ESRD (end-stage renal disease) ([facility])
   Hypertension, essential
   Pleural effusion
   Chronic systolic congestive heart failure (HCC)
   Chronic diarrhea
   Chronic anticoagulation
   Cardiomyopathy (HCC)
   COPD (chronic obstructive pulmonary disease) ([facility])
   ICD (implantable cardioverter-defibrillator) in place
   Paroxysmal atrial fibrillation (HCC)
   Chronic multifocal osteomyelitis of left foot ([facility])
 
   PVD (peripheral vascular disease) ([facility])
 Resolved Problems:
   * No resolved hospital problems. *
 
 Procedures:  * No surgery found *
 
 Significant Diagnostic Studies:  No results found.
 
 BRIEF HISTORY OF PRESENTATION:  
      Cherie Villalobos is a 68 y.o. female who presented per H&P"from [facility] of chest pain that started last night about midnight and lasted until about 5 AM this morn
ing, pressure, intense, nonradiating, with nausea and dyspnea, no sweating. Improved with SL
 NTG initially, but the NTG's became less effective as she took more of them (5 total). Got 
a NTG patch at [facility] ED which was very effective in controlling the pain. She has ve
ry complicated cardiac history including CAD, CABG, systolic CHF, ischemic cardiomyopathy, p
acemaker, valve repair, afib, anticoagulation, with several other comorbidities including CO
PD, ESRD on HD, DM2, PVD. She has a cardiologist in [place] but [facility] refused to
 call that doctor to aid in decision-making and simply transferred the patient to [facility] ED.
 
 The patient was at [facility] a month ago for L foot toe amputation due to osteomyelitis. Dr Fr correia is her surgeon. During that hospitalization she c/o chest pain with troponin elevation
 to 0.318 and was evaluated by cardiology. Stress test a month prior had been normal. It was
 decided that medical management was the only reasonable option for the patient, given her e
xtensive comorbidities.
 
 Post-amputation she has been instructed to be strict NWB on LLE, and she was sent to [facility]. She remains on abx post-procedure managed by Dr Elliott. The patient is convinced that [facility] is not administering her meds correctly. She thinks they might be skipping her Eliqui
s, clopidogrel, and carvedilol. This happened before, earlier in 2018. She says the nurses dakota miller that the meds are given but they are not given. She does not want to return to [facility],
 if at all possible, and wants to return home with paid caregiver if that will comply with STEVEN Jordan's post-op instructions"
 HOSPITAL COURSE:  
 She was seen by  and he recc to stop all IV abx.
 She was discharged home with full time care giver. 
 She was seen by  and he recc medical management 
 Patient was discharged in stable condition. Addressed all of their questions and covered wi
th side affects of newly added medications. she was cleared per consulting services.
 
 Past Medical History 
 Diagnosis Date 
   Acute MI (HCC)  
  with stenting x 1 
   Anemia associated with chronic renal failure 2016 
   Atrial fibrillation (HCC)  
   Chronic kidney disease  
   Congestive heart failure (HCC)  
   COPD (chronic obstructive pulmonary disease) (HCC)  
   COPD (chronic obstructive pulmonary disease) ([facility]) 2018 
   Coronary artery disease  
  stent placements: 3 total 
   Depression  
   Diabetes other 
  abstracted 
   Diabetes mellitus, type 2 ([facility])  
   ESRD on peritoneal dialysis ([facility])  
   GERD (gastroesophageal reflux disease)  
   Hemodialysis patient ([facility]) 10/2016 
  Stopped peritoneal and restarted hemodialysis 
   Hemorrhage of gastrointestinal tract, unspecified 2017 
 
  low GI, rectal bleeding 
   High blood pressure other 
  abstracted 
   High cholesterol other 
  abstracted 
   Hyperlipidemia  
   Hypertension  
   Hyponatremia 2017 
   Myocardial infarction ([facility]) 2017 
   Other chronic pain  
  feet,  
   Pain of left hip joint 2016 
  due to hip fracture and replacement 
   Partial small bowel obstruction ([facility]) 2017 
  resolved 
   Renal failure  
  Stage 5.   Fistula in the JAN - not on dialysis yet. 
   Unspecified visual disturbance  
  glasses 
 
 Past Surgical History 
 Procedure Laterality Date 
   AV FISTULA PLACEMENT   
  left upper arm AV fistula - failed 
   AV FISTULA REPAIR N/A 10/25/2016 
  Procedure: AV FISTULA - GRAFT REPAIR/REVISION;  Surgeon: Kirt Mclaughlin MD;  Location: [facility]
IN OR;  Service: Vascular;  Laterality: N/A;  Superficialization and revision of left arm fi
stula 
   CARDIAC CATHETERIZATION  2017 
   CARPAL TUNNEL RELEASE Bilateral other 
  abstracted 
   CATARACT EXTRACTION Bilateral  
   CATHETER REMOVAL N/A 2016 
  Procedure: DIALYSIS CATHETER - REMOVAL;  Surgeon: Kirt Mclaughlin MD;  Location: [facility] MAIN OR; 
 Service: Vascular;  Laterality: N/A;  PD cath removal 
   CHOLECYSTECTOMY  other 
  abstracted 
   COLONOSCOPY   
   CORONARY ARTERY BYPASS GRAFT   
   CORONARY STENT PLACEMENT  2017 
  Drug Elutin stents to OM, 1 stent to prox. LAD 
   EYE SURGERY   
   FOOT AMPUTATION Left 2018 
  Procedure: FOREFOOT - AMPUTATION;  Surgeon: Srinivasan Jordan DPM;  Location: [facility] MAIN OR;
  Service: Podiatry;  Laterality: Left; 
   HARDWARE PRESENT   
  stent placements x 3, Left hip replacement, vascular stents 2 in left leg 
   HIP ARTHROPLASTY Left 2016 
  Procedure: HIP - ARTHROPLASTY(ALLISON);  Surgeon: Uriel Roa MD;  Location: [facility] MAIN 
OR;  Service: Orthopedics;  Laterality: Left; 
   HYSTERECTOMY  1998 
  complete 
   PACEMAKER INSERTION   
  [employer] pacemaker/defib 
   PERITONEAL CATHETER INSERTION  8/15/2013 
   PERITONEAL CATHETER INSERTION N/A 2016 
  Procedure: LAPAROSCOPIC - PERITONEAL DIALYSIS CATH INSERTION;  Surgeon: Kirt Mclaughlin MD;  Lo
cation: [facility] MAIN OR;  Service: Vascular;  Laterality: N/A;  Removal of old catheter 
   SHOULDER SURGERY Right  
  frozen shoulder 
 
   UNLISTED PROCEDURE ARTHROSCOPY   
  total Left hip 
   vascular stents Left 2017 
  leg (2 stents) 
   VASCULAR SURGERY   
 
 Allergies 
 Allergen Reactions 
   Quinapril Hcl Anaphylaxis 
   Digoxin And Related Other (See Comments) 
   toxic 
   Metaxalone Other (See Comments) 
   Morphine Mental Changes 
   Make her crazy/ "funny feeling in my head"
 Has used hydromorphone in-house 
   Pantoprazole Sodium Nausea and Vomiting 
   Penicillins Rash 
   Quinapril Hcl Edema 
   Facial Edema 
   Sudafed [Pseudoephedrine Hcl] Rash 
 
 No prescriptions prior to admission. 
 
 DISCHARGE EXAM
 Vital Signs:
 /56 (BP Location: Right upper arm)  | Pulse 68  | Temp 97.7 F (36.5 C) (Axillary)
  | Resp 20  | Ht 1.778 m (5' 10")  | Wt 65.5 kg (144 lb 6.4 oz)  | SpO2 92%  | BMI 20.72 kg
/m 
 General appearance: alert, appears stated age, cooperative, fatigued and no distress
 Head: Normocephalic, without obvious abnormality, atraumatic
 Neck: no adenopathy, no carotid bruit, no JVD, supple, symmetrical, trachea midline and thy
roid not enlarged, symmetric, no tenderness/mass/nodules
 Lungs: clear to auscultation bilaterally
 Heart: regular rate and rhythm, S1, S2 normal, no murmur, click, rub or gallop
 Abdomen: soft, non-tender; bowel sounds normal; no masses,  no organomegaly
 Extremities: left leg boot 
 Pulses: 2+ and symmetric
 Neurologic: Grossly normal AXO3 non focal 
 
 DATA
 
 CBC:  
 Lab Results 
 Component Value Date 
  WBC 4.08 2019 
  RBC 2.83 (L) 2019 
  HGB 9.7 (L) 2019 
  HCT 29.7 (L) 2019 
  .0 (H) 2019 
  MCH 34.2 (H) 2019 
  MCHC 32.5 2019 
  RDW 64.3 (H) 2019 
   (L) 2019 
  MPV 9.5 2019 
  DIFFTYPE MANUAL 2019 
 
 CMP:  
 Lab Results 
 Component Value Date 
   2019 
 
  K 4.4 2019 
   2019 
  CO2 28 2019 
  ANIONGAP 14 2019 
  GLUF 153 (H) 2019 
  BUN 15 2019 
  CREATININE 4.9 (H) 2019 
  BCR 3 2019 
  CA 9.6 2019 
  CA 8.7 2016 
  PROT 6.1 (L) 2019 
  ALB 2.6 (L) 2019 
  GLOB 3.5 2019 
  BILITOT 0.5 2019 
  ALP 85 2019 
  AST 22 2019 
  ALT 22 2019 
  EGFR 9 (L) 2019 
 
 Lab Results 
 Component Value Date 
  CKTOTAL 22 (L) 2019 
  CKMB 1.8 2019 
  CKMBINDEX 8.2 2019 
  TROPONINI 0.061 2019 
 
 Disposition:  Home
 Condition:  Stable
 Code Status:  Prior
 
 Discharge Instructions
 Diet Renal 
 
 Diet Low Sodium 
 
 Activity as Advised by Physical Therapy 
 
 Call MD for: Temperature > 100.4F (38C) 
 
 Call MD for:  Persistant Nausea and Vomiting 
 
 Call MD for:  Severe Uncontrolled Pain 
 
 Call MD for:  Redness, Tenderness, or Signs of Infection (Pain, Swelling, Redness, Odor or 
Green/Yellow Discharge Around Incision Site) 
 
 Call MD for:  Hives 
 
 Call MD for:  Difficulty Breathing, Headache or Visual Disturbances 
 
 Call MD for:  Persistant Dizziness or Light-Headedness 
 
 Call MD for: Extreme Fatigue 
 
 Follow up:
 Ivy Couch, DO
 [address], [address]
 [facility] [id number] [phone]
 
 
 Schedule an appointment as soon as possible for a visit in 1 week
 
 Andrés Elliott MD
 [address] [facility] [id number] [phone]
 
 Schedule an appointment as soon as possible for a visit in 1 week
 
 Srinivasan Jordan DPM
 [address], [address]
 [facility] [id number] [phone]
 
 Schedule an appointment as soon as possible for a visit in 2 weeks
 
 Celestino Porras MD
 [address]  [facility]
 [facility] [id number] [phone]
 
 As needed
 
  
 Medication List 
  
 CHANGE how you take these medications  
 carvedilol 12.5 MG tablet
 QTY:  60 tablet
 Refills:  0
 Doctor's comments:  Hold for SBP less than 100
 Hold for HR less than 60
 Commonly known as:  COREG
 Take 1 tablet by mouth 2 (two) times daily with meals.
 What changed:
  medication strength
  how much to take
  
 LORazepam 1 MG tablet
 QTY:  30 tablet
 Refills:  0
 Commonly known as:  ATIVAN
 Take 1 tablet by mouth every 8 (eight) hours as needed for Anxiety.
 What changed:  when to take this
  
 losartan 25 MG tablet
 QTY:  30 tablet
 Refills:  0
 Commonly known as:  COZAAR
 Take 1 tablet by mouth daily.
 What changed:  how much to take
  
  
 CONTINUE taking these medications  
 * albuterol 108 (90 Base) MCG/ACT inhaler
 Refills:  0
 Commonly known as:  PROVENTIL HFA;VENTOLIN HFA
  
 * VENTOLIN  (90 Base) MCG/ACT inhaler
 Refills:  0
 
 Generic drug:  albuterol
  
 apixaban 2.5 MG tablet
 QTY:  60 tablet
 Refills:  0
 Commonly known as:  ELIQUIS
 Take 1 tablet by mouth 2 (two) times daily.
  
 aspirin 81 MG EC tablet
 QTY:  30 tablet
 Refills:  0
 Take 1 tablet by mouth daily with breakfast.
  
 atorvastatin 40 MG tablet
 QTY:  30 tablet
 Refills:  0
 Commonly known as:  LIPITOR
 Take 1 tablet by mouth nightly.
  
 calcium acetate 667 MG capsule
 Refills:  0
 Commonly known as:  PHOSLO
  
 clopidogrel 75 MG tablet
 QTY:  30 tablet
 Refills:  11
 Commonly known as:  PLAVIX
 Take 1 tablet by mouth daily.
  
 esomeprazole 20 MG capsule
 Refills:  0
 Commonly known as:  NEXIUM
  
 HYDROcodone-acetaminophen 5-325 MG per tablet
 QTY:  30 tablet
 Refills:  0
 Commonly known as:  NORCO
 Take 1 tablet by mouth every 4 (four) hours as needed.
  
 insulin aspart 100 UNIT/ML injection
 Refills:  0
 Commonly known as:  NOVOLOG
  
 insulin degludec 100 UNIT/ML injection
 Refills:  0
 Commonly known as:  TRESIBA
  
 isosorbide mononitrate 60 MG 24 hr tablet
 QTY:  30 tablet
 Refills:  1
 Commonly known as:  IMDUR
 Take 1 tablet by mouth daily.
  
 loperamide 2 MG capsule
 Refills:  0
 Commonly known as:  IMODIUM
  
 nitroGLYCERIN 0.4 MG SL tablet
 QTY:  30 tablet
 Refills:  0
 
 Doctor's comments:  Hold for SBP less than 100
 Commonly known as:  NITROSTAT
 Place 1 tablet under the tongue every 5 (five) minutes as needed for Chest pain.
  
 nortriptyline 25 MG capsule
 Refills:  0
 Commonly known as:  PAMELOR
  
 ondansetron 4 MG disintegrating tablet
 Refills:  0
 Commonly known as:  ZOFRAN-ODT
  
 oxybutynin 5 MG tablet
 Refills:  0
 Commonly known as:  DITROPAN
  
 prochlorperazine 5 MG tablet
 Refills:  0
  
 ranitidine 150 MG tablet
 Refills:  0
 Commonly known as:  ZANTAC
  
 rOPINIRole 0.25 MG tablet
 Refills:  0
 Commonly known as:  REQUIP
  
 SLOW-MAG 71.5-119 MG Tbec
 Refills:  0
 Generic drug:  Magnesium Cl-Calcium Carbonate
  
 Vitamin D3 5000 units Caps
 Refills:  0
  
 Vortioxetine HBr 10 MG Tabs
 Refills:  0
  
  
 * This list has 2 medication(s) that are the same as other medications prescribed for you. 
Read the directions carefully, and ask your doctor or other care provider to review them wit
h you. 
  
  
  
 You might also be taking other medications not listed above. If you have questions about an
y of your other medications, talk to the person who prescribed them or your Primary Care Pro
vider. 
  
  
  
 STOP taking these medications  
 CefTAZidime and Dextrose 2-5 GM-%(50ML) Solr
  
 furosemide 20 MG tablet
 Commonly known as:  LASIX
  
 vancomycin 1000 mg injection
 Commonly known as:  VANCOCIN
  
  
 
  
 Where to Get Your Medications 
  
 You can get these medications from any pharmacy  
 Bring a paper prescription for each of these medications
  carvedilol 12.5 MG tablet
  LORazepam 1 MG tablet
  nitroGLYCERIN 0.4 MG SL tablet
  
 
 Discharge took over 30  minutes, to include final examination, discussion of admission, and
 preparation of prescriptions, instructions for on-going care, follow-up and documentation o
f discharge summary.
 
 Silas Ferrara MD
 2019in this encounter
 
 Discharge Instructions
 Julia Riley, RN - 2019
 Stable Angina
 The chest discomfort you have appears to be coming from your heart  a condition called ang
imcaela. Angina is a pain in the heart due to poor blood flow to the heart muscle. This can occu
r when plaque builds up on the artery wall or a blood clot forms in one or more of the small
 blood vessels that deliver oxygen to the heart muscle. Plaque is a fatty material made up o
f cholesterol, calcium deposits, and other materials.
 Exercise, increased activity, emotional upset, or stress can trigger this pain. With proper
 treatment and lifestyle changes to reduce risk factors, most people with angina are able to
 maintain a full and active life.
 Angina is not a heart attack. But if angina pain is severe or prolonged, it is a sign ofa
n impending heart attack, also called acute myocardial infarction, or AMI. Your angina is un
elizabeth control at this time. Therefore, it is safe for you to go home. Follow up as instructed 
by your doctor for any further tests and office visits.
 
 Home care
  Rest at home today and avoid any emotional or physically strenuous activity.
  Take medicine (usually nitroglycerin) for chest pain exactly as prescribed. Keep your ni
troglycerin with you at all times.
  When taking nitroglycerin for angina, sit or lie down. The medicine may make you feel di
zzy.
  Place one tablet under your tongue, or between your lip and gum, or between your cheek a
nd gum. Let the tablet dissolve completely; do not chew or swallow the tablet.
  If you use a spray, then spray once on orunder your tongue. Do not inhale. Right after
 you use the spray, close your mouth. Wait a few seconds before you swallow.
  After taking one tablet or spraying once, continue sitting or lying for 5 minutes.
  If the angina goes away completely, rest awhile and continue your normal routine.
 Note: Your healthcare provider may give you slightly different instructions than those claudine fernandez. If so, follow them carefully.
 Prevention
  Learn how to take your own blood pressure. Keep a record of your results. Ask your docto
r which readings mean that you need medical attention. Reduce salt intake and follow lifesty
le change instructions if you have high blood pressure (hypertension).
  Maintain a healthy weight. Get help to lose any extra pounds. Talk to your doctor about 
a safe diet program. Sudden large weight losses can be dangerous.
  If you have diabetes, talk to your doctor about healthy control of your blood sugar.
  Begin an exercise program. Ask your doctor how to get started. You can benefit from simp
le activities such as walking or gardening. Short, high intensity exercise sessions may also
 be beneficial.
  Break the smoking habit. Enroll in a stop-smoking program to improve your chances of suc
cess.
  Get adequate rest.
 
  Stay away from stressful situations. Learn stress-management techniques.
 Follow-up care
 Followup with your doctor, or as advised.
 If an X-ray wasdone , it will be reviewed by another specialist. You will be notified of 
any new findings that may affect your care.
 Call 911
 This is the fastest and safest way to get to the nearest emergency department. The paramedi
cs can also start treatment on the way to the hospital, saving valuable time for your heart.
  Ifangina gets worse, it continues, or if it stops and returns, call 911 right away. Do
n't delay. You may be having a heart attack.
  After you call 911, take a second nitroglycerine tablet or spray unless instructed other
wise. When repeating doses, sit down if possible because it can make you feel lightheaded or
 dizzy. Wait another 5 minutes. If the angina still does not go away, take a third tablet or
 spray. Don't take more than 3 tablets or sprays within 15 minutes. Stay on the phone with 9
11 for more instructions.
  Your healthcare provider may give you slightly different instructions than those above. 
If so, follow them carefully.
 Don'twait until your symptoms are severe to call 911. Other reasons to call 911 besides c
hest pain include:
  Trouble breathing
  Feeling lightheaded, faint, or dizzy
  Rapid heart beat
  Slower than usual heart rate compared to your normal
  Angina with weakness, dizziness, fainting, heavy sweating, nausea, or vomiting
  Extreme drowsiness, or confusion
  Weakness of an arm or leg or on one side of the face
  Difficulty with speech or vision
 When to seek medical advice
 Remember, the signs and symptoms of a heart attack are not always like they are on TV. Some
times they are not so obvious. You may only feel weak or just "not right." If it is not charly
r or if you have any doubt, call for advice.
  Seek help if there is a change in the type of pain, if it feels different, or if your sy
mptoms are mild.
  Don't drive yourself. Have someone else drive. If no one can drive you, call 911.
  If your doctor has given you medicine to take when you have symptoms, take them, but do 
not delay getting help.
  Don't delay. Fast diagnosis and treatment can prevent or limit the amount of heart dam
age during a heart attack or stroke.
  Don't go to your doctor's office or a clinic because they may not be able to provide all
 the testing and treatment you need.
 Date Last Reviewed: 2015-2018 [employer]. [address] [place] [address], [place] [address]. All righ
ts reserved. This information is not intended as a substitute for professional medical care.
 Always follow your healthcare professional's instructions.
 
 in this encounter
 
 Medications at Time of Discharge
 
 
+----------------------+----------------------+--------+---------+----------+-----------+
| Medication           | Sig.                 | Disp.  | Refills | Start    | End Date  |
|                      |                      |        |         | Date     |           |
+----------------------+----------------------+--------+---------+----------+-----------+
|   albuterol          | Inhale 2 puffs into  |        |         |          |           |
| (PROVENTIL           | the lungs every 4    |        |         |          |           |
| HFA;VENTOLIN HFA)    | (four) hours as      |        |         |          |           |
| 108 (90 Base)        | needed for Wheezing. |        |         |          |           |
| MCG/ACT              |                      |        |         |          |           |
| inhalerIndications:  |                      |        |         |          |           |
 
| states uses 2x       |                      |        |         |          |           |
| monthly average      |                      |        |         |          |           |
+----------------------+----------------------+--------+---------+----------+-----------+
|   apixaban (ELIQUIS) | Take 1 tablet by     |   60   | 0       | 20 |           |
|  2.5 MG tablet       | mouth 2 (two) times  | tablet |         | 18       |           |
|                      | daily.               |        |         |          |           |
+----------------------+----------------------+--------+---------+----------+-----------+
|   carvedilol (COREG) | Take 1 tablet by     |   60   | 0       | 20 |  |
|  12.5 MG tablet      | mouth 2 (two) times  | tablet |         | 19       | 0         |
|                      | daily with meals.    |        |         |          |           |
+----------------------+----------------------+--------+---------+----------+-----------+
|   esomeprazole       | Take 1 capsule by    |        |         |          |           |
| (NEXIUM) 40 MG       | mouth every day      |        |         |          |           |
| capsule              |                      |        |         |          |           |
+----------------------+----------------------+--------+---------+----------+-----------+
|                      | Take 1 tablet by     |   30   | 0       | 20 |           |
| HYDROcodone-acetamin | mouth every 4 (four) | tablet |         | 19       |           |
| ophen (NORCO) 5-325  |  hours as needed.    |        |         |          |           |
| MG per tablet        |                      |        |         |          |           |
+----------------------+----------------------+--------+---------+----------+-----------+
|   insulin aspart     | Inject  into the     |        |         |          |           |
| (NOVOLOG) 100        | skin 3 (three) times |        |         |          |           |
| UNIT/ML injection    |  daily before meals. |        |         |          |           |
|                      |  Sliding scale       |        |         |          |           |
+----------------------+----------------------+--------+---------+----------+-----------+
|   insulin degludec   | Inject 21 units      |        |         |          |           |
| (TRESIBA) 100        | every night          |        |         |          |           |
| UNIT/ML injection    |                      |        |         |          |           |
+----------------------+----------------------+--------+---------+----------+-----------+
|   isosorbide         | Take 1 tablet by     |   30   | 1       | 20 |  |
| mononitrate (IMDUR)  | mouth daily.         | tablet |         | 18       | 9         |
| 60 MG 24 hr tablet   |                      |        |         |          |           |
+----------------------+----------------------+--------+---------+----------+-----------+
|   nitroGLYCERIN      | Place 1 tablet under |   30   | 0       | 20 |  |
| (NITROSTAT) 0.4 MG   |  the tongue every 5  | tablet |         | 19       | 0         |
| SL tablet            | (five) minutes as    |        |         |          |           |
|                      | needed for Chest     |        |         |          |           |
|                      | pain.                |        |         |          |           |
+----------------------+----------------------+--------+---------+----------+-----------+
|   nortriptyline      | Take 25-75 mg by     |        |         |          |           |
| (PAMELOR) 25 MG      | mouth See Admin      |        |         |          |           |
| capsule              | Instructions. Takes  |        |         |          |           |
|                      | 25 mg by mouth every |        |         |          |           |
|                      |  morning and 75 mg   |        |         |          |           |
|                      | every night          |        |         |          |           |
+----------------------+----------------------+--------+---------+----------+-----------+
|   ondansetron        | Take 4 mg by mouth   |        |         | 20 |           |
| (ZOFRAN-ODT) 4 MG    | every 8 (eight)      |        |         | 18       |           |
| disintegrating       | hours as needed.     |        |         |          |           |
| tablet               |                      |        |         |          |           |
+----------------------+----------------------+--------+---------+----------+-----------+
|   oxybutynin         | Take 7.5 mg by mouth |        |         |          |           |
| (DITROPAN) 5 MG      |  2 (two) times       |        |         |          |           |
| tablet               | daily.               |        |         |          |           |
+----------------------+----------------------+--------+---------+----------+-----------+
|   rOPINIRole         | Take 0.75 mg by      |        |         |          |           |
| (REQUIP) 0.25 MG     | mouth nightly.       |        |         |          |           |
| tablet               |                      |        |         |          |           |
+----------------------+----------------------+--------+---------+----------+-----------+
|   albuterol          | Ventolin HFA 90      |        |         |          |  |
 
| (VENTOLIN HFA) 108   | mcg/actuation        |        |         |          | 9         |
| (90 Base) MCG/ACT    | aerosol inhaler      |        |         |          |           |
| inhaler              | Inhale 2 puffs every |        |         |          |           |
|                      |  4 hours by          |        |         |          |           |
|                      | inhalation route as  |        |         |          |           |
|                      | needed.              |        |         |          |           |
+----------------------+----------------------+--------+---------+----------+-----------+
|   aspirin 81 MG EC   | Take 1 tablet by     |   30   | 0       | 07/10/20 |  |
| tablet               | mouth daily with     | tablet |         | 17       | 9         |
|                      | breakfast.           |        |         |          |           |
+----------------------+----------------------+--------+---------+----------+-----------+
|   atorvastatin       | Take 1 tablet by     |   30   | 0       | 20 |  |
| (LIPITOR) 40 MG      | mouth nightly.       | tablet |         | 19       | 9         |
| tablet               |                      |        |         |          |           |
+----------------------+----------------------+--------+---------+----------+-----------+
|   calcium acetate    | 667 mg 3 (three)     |        |         | 20 |  |
| (PHOSLO) 667 MG      | times daily with     |        |         | 16       | 9         |
| capsule              | meals.               |        |         |          |           |
+----------------------+----------------------+--------+---------+----------+-----------+
|   Cholecalciferol    | Take 5,000 capsules  |        |         |          |  |
| (VITAMIN D3) 5000    | by mouth every other |        |         |          | 9         |
| UNITS CAPS           |  day.                |        |         |          |           |
+----------------------+----------------------+--------+---------+----------+-----------+
|   clopidogrel        | Take 1 tablet by     |   30   | 11      | 20 |  |
| (PLAVIX) 75 MG       | mouth daily.         | tablet |         | 18       | 9         |
| tablet               |                      |        |         |          |           |
+----------------------+----------------------+--------+---------+----------+-----------+
|   loperamide         | 2 mg as needed.      |        |         |          |  |
| (IMODIUM) 2 MG       |                      |        |         |          | 9         |
| capsule              |                      |        |         |          |           |
+----------------------+----------------------+--------+---------+----------+-----------+
|   LORazepam (ATIVAN) | Take 1 tablet by     |   30   | 0       | 20 | 02/15/201 |
|  1 MG tablet         | mouth every 8        | tablet |         | 19       | 9         |
|                      | (eight) hours as     |        |         |          |           |
|                      | needed for Anxiety.  |        |         |          |           |
+----------------------+----------------------+--------+---------+----------+-----------+
|   Magnesium          | Take 1 tablet by     |        |         |          |  |
| Cl-Calcium Carbonate | mouth every other    |        |         |          | 9         |
|  (SLOW-MAG) 71.5-119 | day.                 |        |         |          |           |
|  MG TBEC             |                      |        |         |          |           |
+----------------------+----------------------+--------+---------+----------+-----------+
|   prochlorperazine   | Take 5 mg by mouth 2 |        |         |          |  |
| (COMPAZINE) 5 MG     |  (two) times daily   |        |         |          | 9         |
| tabletIndications:   | as needed for        |        |         |          |           |
| Severe Nausea and    | Nausea. Indications: |        |         |          |           |
| Vomiting             |  Severe Nausea and   |        |         |          |           |
|                      | Vomiting             |        |         |          |           |
+----------------------+----------------------+--------+---------+----------+-----------+
|   ranitidine         | ranitidine 150 mg    |        |         |          |  |
| (ZANTAC) 150 MG      | tablet Take 1 tablet |        |         |          | 9         |
| tablet               |  twice a day by oral |        |         |          |           |
|                      |  route.              |        |         |          |           |
+----------------------+----------------------+--------+---------+----------+-----------+
|   Vortioxetine HBr   | 10 mg nightly.       |        |         |          |  |
| 10 MG TABS           |                      |        |         |          | 9         |
+----------------------+----------------------+--------+---------+----------+-----------+
 as of this encounter
 
 Progress Notes
 Ethan Awad MD - 2019 12:31 PM PSTFormatting of this note may be different from t
 
adrianna original.
 4460/4460-1  
    LOS: 0 days 
 She is followed for ESRD management.
 She was admitted with chest pain and concern with unstable angina/NSTEMI.
 I assumed nephrology care from Dr. Nguyen 19.
 She feels OK today and is happy at being discharged today.
 She had uncomplicated HD yesterday and has no chest pain today.
 She is ambulating with minimal weight bearing left foot.
 She has no chest pain.
 
 PMH, PSH, Social history reviewed in chart. Allergies and medications reviewed. Previous hi
story summarized as above. Prior lab data and imaging reviewed.Patient's old records and lab
s were reviewed in detail and summarized.
 
 ROS:
 Review of systems is as per history of present illness. Otherwise a 14 organ system review 
of systems was done and is negative.
 
 Examination:
 
 APPEARANCE: She is is alert, awake, sitting up in chair in no distress. 
 VITALS: Reviewed as listed.
 HEAD: NC/AT. 
 EYES: EOMI. Non-icteric sclera.
 NECK: No JVD. 
 LUNGS: Effort fair. CTA. 
 HEART: S1 soft, no pericardial rub noted. Systolic murmur at apex with radiation to back.
 ABDOMEN: Soft with minimal distention, no tenderness. No organomegaly or bruits noted. Benoit
l sounds diminished.
 EXTREMITIES: No LE edema. No cyanosis or clubbing. Peripheral pulses not palpable. Left jeanne
t s/p TMA and in a boot.
 SKIN: Warm to touch. No rash.
 NEUROLOGIC: Alert, awake, oriented, speech fluent. Gait not tested. 
 PSYCH: pleasant demeanor..
 BACK: No CVA tenderness noted. There is no sacral edema.
 
 Dialysis access
 Left brachiocephalic AV fistula with no evidence of malfunction or inflammation
 
 vital Signs:
 /56 (BP Location: Right upper arm)  | Pulse 68  | Temp 97.7 F (36.5 C) (Axillary)
  | Resp 20  | Ht 1.778 m (5' 10")  | Wt 65.5 kg (144 lb 6.4 oz)  | SpO2 92%  | BMI 20.72 kg
/m 
 
 Data evaluation:
 
 Lab Results 
 Component Value Date 
  BUN 15 2019 
  CREATININE 4.9 (H) 2019 
  EGFR 9 (L) 2019 
   2019 
  K 4.4 2019 
   2019 
  CO2 28 2019 
  CA 9.6 2019 
  PHOS 4.4 2019 
  MG 2.2 2018 
  ALB 2.6 (L) 2019 
 
  HGB 9.7 (L) 2019 
 
 Lab Results 
 Component Value Date 
  HGB 9.7 (L) 2019 
  HGB 8.5 (L) 2019 
  HGB 8.7 (L) 2019 
  FERRITIN 722 (H) 2017 
  FERRITIN 793 (H) 2016 
  FERRITIN 883 (H) 2016 
  LABIRON 28 2017 
  LABIRON 17 2016 
  LABIRON 31 2016 
 
 Lab Results 
 Component Value Date 
  ANIONGAP 14 2019 
 
 Last 3 Troponin:  
 Lab Results 
 Component Value Date 
  TROPONINI 0.061 2019 
  TROPONINI 0.075 2019 
  TROPONINI 0.256 (H) 2019 
 
  Lower Extremity Arterial Left 2018 showed Greater than 50% stenosis of the mid sup
erficial femoral artery. 2.  Monophasic single runoff to the ankle via the anterior tibial a
rtery.
 
 X-ray Foot Left 2018 showed amputation of the left forefoot at the level of the proxi
mal metatarsals. No radiographic evidence for osteomyelitis. Normal alignment of the hindfoo
t. Mild degeneration of the midfoot. 
 
 EKG 19 showed Normal sinus rhythm. Non-specific intra-ventricular conduction delay. No
nspecific ST abnormality. Poor R - progression 
 
 Assessment and Recommendations:
 
 1. ESRD on HD
 2. Anemia chronic renal failure
 3. Chronic systolic CHF s/p ICD
 4. Chest pain with concern for angina
 5. Anemia chronic renal failure
 6. Chronic anticoagulation
 7. PAD s/p left TMA
 8. Paroxysmal Afib
 9. CAD s/p CABG
 
 She is hemodynamically stable with no fever/tachycardia/bradycardia/hypotension or severe h
ypertension/hypoxia at rest.
 She is euvolemic.
 BP is OK and she remains in SR on EKG. She remains on BB/ARB.
 It doesn't look like she had an AMI.
 She remains on Clopidogrel/apixaban/atorvastatin/long acting nitrate. She has been seen by 
Dr. Porras whose evaluation is noted.
 Gastritis was a consideration. She remains on PPI.
 
  From my side she can be discharged and resume outpatient HD TTS.
  Continue follow up with cardiology for optimal medical management.
 
 
 She should be on a 2 gm Na, 2 gm K, 1 gm PO4, 1 gm/kg protein diet.
 Please dose all medications for an eGFR of less than 15 ml/min/1.73 m2.
 NSAIDS/HOPPER 2 inhibitors should be avoided. Aluminum/magnesium containing antacids and magne
sium/phosphate containing enemas should be avoided.
 Fluid should be restricted to less than 1.5 L in a 24 hr period.
 Strict I/O should be done.
 Plan of care was discussed with Dr. Ferrara.
 
 ETHAN AWAD MD
 2019
 
 Portions of my previous notes have been carried over for continuity of care.
 She was seen earlier in the day and charting was completed later after rounds.
 Dictation software, Dragon, was used which may contain error for similar sounding words. Pe
rsonal communication is requested for any clarification.
 Prognosis is guarded in view of multiple comorbid illnesses and ESRD including but not limi
ashly to death.
 
   apixaban  2.5 mg Oral BID 
   atorvastatin  40 mg Oral Nightly 
   calcium acetate  667 mg Oral TID WC 
   calcium carbonate  1,000 mg Oral Q48H 
  And 
   magnesium chloride  1 tablet Oral Q48H 
   carvedilol  12.5 mg Oral BID WC 
   cholecalciferol  1,000 Units Oral Daily 
   clopidogrel  75 mg Oral Daily 
   insulin detemir  10 Units Subcutaneous Nightly 
   insulin lispro (human)  0-3 Units Subcutaneous Nightly 
   insulin lispro (human)  0-6 Units Subcutaneous TID AC 
   isosorbide mononitrate  60 mg Oral Daily 
   losartan  25 mg Oral Daily 
   nortriptyline  25 mg Oral QAM 
   nortriptyline  75 mg Oral Nightly 
   oxybutynin  2.5 mg Oral BID 
   pantoprazole  40 mg Oral QAM AC 
   rOPINIRole  0.75 mg Oral Nightly 
 
   dextrose   
 
 Celestino Porras MD - 2019  7:14 AM PSTFormatting of this note may be different fro
m the original.
 [facility]
 Service:  Cardiology
 Progress Note
 
 Name of Consultant: Celestino Porras MD
 I have seen the patient on 2019 
 SUBJECTIVE
 
 Current Facility-Administered Medications: 
    acetaminophen (TYLENOL) tablet 650 mg, 650 mg, Oral, Q6H PRN **OR** acetaminophen (TYL
ENOL) suppository 650 mg, 650 mg, Rectal, Q6H PRN, Negro Lr DO
    albuterol (PROVENTIL HFA;VENTOLIN HFA) inhaler, 2 puff, Inhalation, Q4H PRN, Negro alexander DO
    apixaban (ELIQUIS) tablet 2.5 mg, 2.5 mg, Oral, BID, Negro Lr DO, 2.5 mg at  2153
    atorvastatin (LIPITOR) tablet 40 mg, 40 mg, Oral, Nightly, Negro Lr DO, 40 mg at 
19 2153
    calcium acetate (PHOSLO) capsule 667 mg, 667 mg, Oral, TID WC, Negro Lr DO, 667 m
 
g at 19 1618
    calcium carbonate (TUMS) chewable tablet 1,000 mg, 1,000 mg, Oral, Q48H, 1,000 mg at 0
19 1147 **AND** magnesium chloride (MAG64) EC tablet 64 mg, 1 tablet, Oral, Q48H, Silas Ferrara MD, 64 mg at 19 1147
    carvedilol (COREG) tablet 12.5 mg, 12.5 mg, Oral, BID , Celestino Porras MD, 12.5 m
g at 19 1618
    cholecalciferol (VITAMIN D-3) tablet 1,000 Units, 1,000 Units, Oral, Daily, Negro drummond DO, 1,000 Units at 19 1148
    clopidogrel (PLAVIX) tablet 75 mg, 75 mg, Oral, Daily, Negro Lr DO, 75 mg at  1147
    dextrose 10 % infusion, , Intravenous, Continuous PRN, Negro Lr DO
    dextrose 50 % solution 12 mL, 12 mL, Intravenous, PRN, Negro Lr DO
    dextrose 50 % solution 25 mL, 25 mL, Intravenous, PRN, Negro Lr DO
    glucagon (GLUCAGEN) injection 0.5 mg, 0.5 mg, Intramuscular, PRN, Negro Lr DO
    glucagon (GLUCAGEN) injection 1 mg, 1 mg, Intramuscular, PRN, Negro Lr DO
    HYDROcodone-acetaminophen (NORCO) 5-325 MG per tablet 1 tablet, 1 tablet, Oral, Q4H [place]
N, Negro Lr DO, 1 tablet at 19 0542
    insulin detemir (LEVEMIR) injection 10 Units, 10 Units, Subcutaneous, Nightly, Negro Lr DO, 10 Units at 19 2154
    insulin lispro (human) (HUMALOG) injection 0-3 Units, 0-3 Units, Subcutaneous, Nightly
, Negro Lr DO, 1 Units at 19 2243
    insulin lispro (human) (HUMALOG) injection 0-6 Units, 0-6 Units, Subcutaneous, TID AC,
 Negro Lr DO, 1 Units at 19 0538
    isosorbide mononitrate (IMDUR) 24 hr tablet 60 mg, 60 mg, Oral, Daily, STEVEN Malcolm
O, 60 mg at 19 0734
    loperamide (IMODIUM) capsule 2 mg, 2 mg, Oral, PRN, Negro Lr DO
    LORazepam (ATIVAN) tablet 1 mg, 1 mg, Oral, Q6H PRN, Silas Ferrara MD, 1 mg at 19 
1634
    losartan (COZAAR) tablet 25 mg, 25 mg, Oral, Daily, Negro Lr DO, 25 mg at 
9 1148
    nitroGLYCERIN (NITROSTAT) SL tablet 0.4 mg, 0.4 mg, Sublingual, Q5 Min PRN, Silas Ferrara MD, 0.4 mg at 19 0539
    nortriptyline (PAMELOR) capsule 25 mg, 25 mg, Oral, QAM, Silas Ferrara MD
    nortriptyline (PAMELOR) capsule 75 mg, 75 mg, Oral, Nightly, Silas Ferrara MD, 75 mg at 
19 2153
    ondansetron (ZOFRAN-ODT) disintegrating tablet 4 mg, 4 mg, Oral, Q6H PRN **OR** ondans
etron (ZOFRAN) injection 4 mg, 4 mg, Intravenous, Q6H PRN, Negro Lr DO
    oxybutynin (DITROPAN) tablet 2.5 mg, 2.5 mg, Oral, BID, Negro Lr DO, 2.5 mg at 2154
    pantoprazole (PROTONIX) EC tablet 40 mg, 40 mg, Oral, QAM AC, Negro Lr DO, 40 mg 
at 19 0542
    polyethylene glycol (GLYCOLAX) packet 17 g, 17 g, Oral, Daily PRN, Negro Lr DO
    prochlorperazine tablet 5 mg, 5 mg, Oral, BID PRN, Negro Lr DO
    rOPINIRole (REQUIP) tablet 0.75 mg, 0.75 mg, Oral, Nightly, Negro Lr DO, 0.75 mg 
at 19
 
 OBJECTIVE
 Vital Signs:
 /60 (BP Location: Right upper arm)  | Pulse 77  | Temp 98.3 F (36.8 C) (Oral)  | 
Resp 18  | Ht 1.778 m (5' 10")  | Wt 65.5 kg (144 lb 6.4 oz)  | SpO2 98%  | BMI 20.72 kg/m
 
 
 Intake/Output Summary (Last 24 hours) at 19 0714
 Last data filed at 19 2252
  Gross per 24 hour 
 Intake             2473 ml 
 Output             3500 ml 
 Net            -1027 ml 
 
 Cardiovascular: Regular rhythm, S1 normal and S2 normal.  
 
 Systolic murmur in the left sternal border. 
 Pulmonary/Chest: Effort normal and breath sounds normal. No wheezes. No rales. 
 Abdominal: Soft. No tenderness. 
 Musculoskeletal: No edema. 
 Neurological: Alert. No cranial nerve deficit. 
 Skin: Warm and dry. 
 
 DATA
 
 Recent Labs
 Lab 19
 0554 19
 0847 
  139 
 K 4.4 4.4 
 CO2 28 32 
 BUN 15 24 
 CREATININE 4.9* 6.5* 
 EGFR 9* 6* 
 
 Recent Labs
 Lab 19
 0554 19
 0847 
 WBC 4.08 4.23 
 HGB 9.7* 8.5* 
 HCT 29.7* 26.0* 
 .0* 103.8* 
 * 134* 
 
 Recent Labs
 Lab 19
 1747 19
 1411 
 TROPONINI 0.061 0.075 
 
 Lab Results 
 Component Value Date 
  CHOL 101 2017 
  TRIG 132 2017 
  LDL 41 2017 
  HDL 34 (L) 2017 
  GLUF 153 (H) 2019 
  HGBA1C 7.5 (H) 2018 
  TSH 1.14 2017 
 
 EK2019
 
 Last Echo: 2018
 Reported with normal LV size, EF 20-25%. Mild AI. Mild AS. Mild MR. Moderate TR. Moderate p
ulmonary HTN RVSP 55.9.
 Last stress test: 2018
 Reported with no evidence of ischemia. 
 Last cath: 2018 shows three-vessel coronary artery disease with widely patent stent in
 the left anterior descending artery and severe stent restenosis in the marginal branch, occ
luded right coronary artery with collateral from left to right.
 Carotid US: 
 AAA screening: 
 Lower extremity US: IR angioplasty 2018
 Angioplasty of the left common iliac artery was then performed using 6 x 40 mm angioplasty 
 
balloon.
 Drug eluting balloon angioplasty of the left SFA was then performed using 4 x 100 mm Lutoni
x balloon.
 OTHERS: on 2018
 STATUS POST Mitral valve repair with a 28 Cristopher annuloplasty band, CABG x 2 with grafts 
to OM and RCA; endoscopic vein harvest (left greater saphenous vein) 
 2018
 S/P insertion of [employer] ICD.
 
 ASSESSMENT & PLAN
 Patient is 68 y.o. with
 1. Coronary artery disease with previous CABG X 2.
 2. Severely impaired systolic function.
 3. Ischemic and non ischemic cardiomyopathy, NYH class III, stage C.
 4. End stage renal disease on HD.
 5. Peripheral vascular disease.
 6. Osteomyelitis.
 7. Anemia due to chronic disease. 
 8. S/P mitral valve repair.
 9. Essential hypertension. 
 10. Insulin dependent diabetes mellitus.
 11. Post Op atrial fibrillation. 
 12. Hyperlipidemia. 
 
 Recommendations:
 Complex past medical history and presentation.
 Patient is chest pain free at this time, no chest pain after isosorbide. 
 Continue with carvedilol 12.5 mg bid..
 Continue with isosorbide mononitrate 60 mg. 
 Needs optimization of Cardiomyopathy treatment. 
 Consider increasing losartan if hemodynamics allow. 
 Furosemide was stopped. 
 Continue with Apixaban and Clopidogrel.
 Aspirin was stopped, anemia and high risk patient on triple therapy.
 Further recommendations will follow. 
 
 Code Status:  Full Code
 
 Celestino Porras MD
 2019 Silas Ferrara MD - 2019  8:15 AM PSTFormatting of this note may be different
 from the original.
 [facility]  
 Service:  Hospitalist
 Progress Note
 
 Hospital Day:   LOS: 0 days 
 Post-Op Day:  * No surgery found *
 SUBJECTIVE
 
      Patient Summary: refer to H&P and consult note for details 
 
      Events Overnight:  Patient seen and examined,denies CP or SOB or abdominal pain,stable
 VS,addressed all questions, HD at bed side, negative troponin.patient would rather be home 
not SNF. 
 
 Scheduled Medications
  
   apixaban  2.5 mg Oral BID 
   atorvastatin  40 mg Oral Nightly 
   calcium acetate  667 mg Oral TID WC 
 
   calcium carbonate  1,000 mg Oral Q48H 
  And 
   magnesium chloride  1 tablet Oral Q48H 
   carvedilol  12.5 mg Oral BID WC 
   cefTAZidime  2 g Intravenous After Hemodialysis (see admin instructions) 
   cholecalciferol  1,000 Units Oral Daily 
   clopidogrel  75 mg Oral Daily 
   insulin detemir  10 Units Subcutaneous Nightly 
   insulin lispro (human)  0-3 Units Subcutaneous Nightly 
   insulin lispro (human)  0-6 Units Subcutaneous TID AC 
   isosorbide mononitrate  60 mg Oral Daily 
   losartan  25 mg Oral Daily 
   nortriptyline  25-75 mg Oral See Admin Instructions 
   oxybutynin  2.5 mg Oral BID 
   pantoprazole  40 mg Oral QAM AC 
   rOPINIRole  0.75 mg Oral Nightly 
   vancomycin  500 mg Intravenous After Hemodialysis (see admin instructions) 
   Vortioxetine HBr  10 mg Oral Nightly 
 
 Continuous Infusions 
   dextrose   
 
 PRN Medications
 acetaminophen **OR** acetaminophen, albuterol, dextrose, dextrose, dextrose, glucagon, gluc
agon, HYDROcodone-acetaminophen, loperamide, LORazepam, nitroGLYCERIN, ondansetron **OR** on
dansetron, polyethylene glycol, prochlorperazine
 
 OBJECTIVE
 Vital Signs:
 /66  | Pulse 100  | Temp 98.3 F (36.8 C)  | Resp 18  | Ht 1.778 m (5' 10")  | Wt 
65.3 kg (143 lb 15.4 oz)  | SpO2 98%  | BMI 20.66 kg/m 
 
 Intake/Output Summary (Last 24 hours) at 19 1245
 Last data filed at 19 1207
  Gross per 24 hour 
 Intake             2000 ml 
 Output             3550 ml 
 Net            -1550 ml 
 
 General appearance: alert, appears stated age, cooperative, fatigued and no distress
 Head: Normocephalic, without obvious abnormality, atraumatic
 Neck: no adenopathy, no carotid bruit, no JVD, supple, symmetrical, trachea midline and thy
roid not enlarged, symmetric, no tenderness/mass/nodules
 Lungs: clear to auscultation bilaterally
 Heart: regular rate and rhythm, S1, S2 normal, no murmur, click, rub or gallop
 Abdomen: soft, non-tender; bowel sounds normal; no masses,  no organomegaly
 Extremities: extremities normal, atraumatic, no cyanosis or edema
 Pulses: 2+ and symmetric
 Neurologic: Grossly normal AXO3 non focal 
 
 DATA
 
 CBC:  
 Lab Results 
 Component Value Date 
  WBC 4.23 2019 
  RBC 2.50 (L) 2019 
  HGB 8.5 (L) 2019 
  HCT 26.0 (L) 2019 
  .8 (H) 2019 
 
  MCH 33.9 2019 
  MCHC 32.6 2019 
  RDW 66.1 (H) 2019 
   (L) 2019 
  MPV 8.7 2019 
  DIFFTYPE AUTOMATED 2019 
 
 CMP:  
 Lab Results 
 Component Value Date 
   2019 
  K 4.4 2019 
  CL 99 2019 
  CO2 32 2019 
  ANIONGAP 12 2019 
  GLUF 197 (H) 2019 
  BUN 24 2019 
  CREATININE 6.5 (H) 2019 
  BCR 4 2019 
  CA 8.7 2019 
  CA 8.7 2016 
  PROT 6.3 2019 
  ALB 2.2 (L) 2019 
  GLOB 3.7 2019 
  BILITOT 0.4 2019 
  ALP 85 2019 
  AST 23 2019 
  ALT 17 2019 
  EGFR 6 (L) 2019 
 
 Lab Results 
 Component Value Date 
  CKTOTAL 22 (L) 2019 
  CKMB 1.8 2019 
  CKMBINDEX 8.2 2019 
  TROPONINI 0.061 2019 
 
 Lab Results 
 Component Value Date 
  PHOS 4.4 2019 
 
 I have reviewed the above labs and radiology reports.
 
 PROBLEM LIST
 
 Principal Problem:
   Chest pain
 Active Problems:
   Coronary artery disease involving native coronary artery of native heart without angina p
ectoris
   ESRD (end stage renal disease) ([facility])
   Type 2 diabetes mellitus with chronic kidney disease on chronic dialysis, with long-term 
current use of insulin ([facility])
   Anemia in ESRD (end-stage renal disease) ([facility])
   Hypertension, essential
   Pleural effusion
   Chronic systolic congestive heart failure (HCC)
   Chronic diarrhea
   Chronic anticoagulation
   Cardiomyopathy ([facility])
 
   COPD (chronic obstructive pulmonary disease) ([facility])
   ICD (implantable cardioverter-defibrillator) in place
   Paroxysmal atrial fibrillation ([facility])
   Chronic multifocal osteomyelitis of left foot ([facility])
   PVD (peripheral vascular disease) ([facility])
 
 Patient diagnosed with:  , and I agree with the following nutritional recommendations:
  
 
 ASSESSMENT & PLAN
 
 Chest pain with known CAD, hx CABG, ischemic cardiomyopathy EF 20%, chronic systolic CHF, H
TN continue current meds also I have d/w and consulted  he [facility] medical managemen
t for now 
 
 P-AFRate controlled continue BB and Eliquis.
 
 L TMA on 18 NWB LLE per , continue abx per Dr Elliott.d/w pathology bone sherri
n is clean of infection 
 
 ESRD on HD per nephrology 
 
 Anemia due to ESRD defer to nephrology 
 
 DM2 continue current insulin regimen and monitor BG adjust as needed
 
 COPD Stable, not exacerbated O2 as needed and nebs 
 
 Anxiety increase ativan to Q6 prn 
 
 GI px PPI
 
 
 PT/OT eval and tx and CW for discharge planning
 
 Review of H/P, imaging and labs, formulation of assessment and plan, discussion with the pa
tient/family, staff, and providers. 
 
 Portions of this chart may have been copied from previous notes for continuity of care purp
oses.
 
 Disposition: admitted 
 Code Status:  Full Code
 
 Silas Ferrara MD
 2019MoBobo quinonez - 2019  7:42 PM PSTPt requested visit. Pt sitting up in bed. Pt 
gave brief hx of inadequate care at previous care facility which led to being at [facility]. Chp
 provided empathetic, supportive listening, entering into pt pain & struggle. Pt talked abou
t a recent "bad dream" she'd had where a threatening presence was in her room (at previous f
acility), & pt instinctive response of calling out to God for help. Pt wondered aloud if urszula
t & other problems meant she wasn't still a [employer]. [facility] pointed out that pt deep, initial
 response was to call out for God, which shows the depth of her ravinder & connection w/God. Pt
 seemed relieved at this observation. Pt not able to get out to [employer], but does have a "TV 
preacher" that gives her the Word & encourages her. [facility] celebrated pt having resources that 
build her up & refresh her - encouraging her to continue doing what works for her. [facility] offer
ed to pray, pt accepted. p prayed for strength, healing, continuing closeness w/sustaining
 God. Pt thanked [facility] for visit & prayer.
 
 Chaplain Bobo Berrios this encounter
 
 
 Plan of Treatment
 
 
+--------+---------+-----------+----------------------+-------------+
| Date   | Type    | Specialty | Care Team            | Description |
+--------+---------+-----------+----------------------+-------------+
| 04/10/ | Office  | Podiatry  |   Srinivasan Jordan,  |             |
| 2019   | Visit   |           | DPM  [address], |             |
|        |         |           |  [address]  [place],  |             |
|        |         |           | [place] [address]             |             |
|        |         |           | [phone]         |             |
|        |         |           | [phone] (Fax)   |             |
+--------+---------+-----------+----------------------+-------------+
 as of this encounter
 
 Procedures
 
 
+----------------------+--------+-------------+----------------------+----------------------
+
| Procedure Name       | Priori | Date/Time   | Associated Diagnosis | Comments             
|
|                      | ty     |             |                      |                      
|
+----------------------+--------+-------------+----------------------+----------------------
+
| POCT GLUCOSE         | Routin | 2019  |                      |   Results for this   
|
|                      | e      | 11:45 AM    |                      | procedure are in the 
|
|                      |        | PST         |                      |  results section.    
|
+----------------------+--------+-------------+----------------------+----------------------
+
| SEDIMENTATION RATE,  | Routin | 2019  |                      |   Results for this   
|
| AUTOMATED            | e - AM |  5:54 AM    |                      | procedure are in the 
|
|                      |        | PST         |                      |  results section.    
|
+----------------------+--------+-------------+----------------------+----------------------
+
| CBC W/MANUAL DIFF    | Routin | 2019  |                      |   Results for this   
|
|                      | e      |  5:54 AM    |                      | procedure are in the 
|
|                      |        | PST         |                      |  results section.    
|
+----------------------+--------+-------------+----------------------+----------------------
+
| C-REACTIVE PROTEIN   | Routin | 2019  |                      |   Results for this   
|
|                      | e - AM |  5:54 AM    |                      | procedure are in the 
|
|                      |        | PST         |                      |  results section.    
|
+----------------------+--------+-------------+----------------------+----------------------
+
| COMPREHENSIVE        | Routin | 2019  |                      |   Results for this   
|
 
| METABOLIC PANEL      | e - AM |  5:54 AM    |                      | procedure are in the 
|
|                      |        | PST         |                      |  results section.    
|
+----------------------+--------+-------------+----------------------+----------------------
+
| POCT GLUCOSE         | Routin | 2019  |                      |   Results for this   
|
|                      | e      |  5:22 AM    |                      | procedure are in the 
|
|                      |        | PST         |                      |  results section.    
|
+----------------------+--------+-------------+----------------------+----------------------
+
| POCT GLUCOSE         | Routin | 2019  |                      |   Results for this   
|
|                      | e      |  9:02 PM    |                      | procedure are in the 
|
|                      |        | PST         |                      |  results section.    
|
+----------------------+--------+-------------+----------------------+----------------------
+
| POCT GLUCOSE         | Routin | 2019  |                      |   Results for this   
|
|                      | e      |  4:16 PM    |                      | procedure are in the 
|
|                      |        | PST         |                      |  results section.    
|
+----------------------+--------+-------------+----------------------+----------------------
+
| POCT GLUCOSE         | Routin | 2019  |                      |   Results for this   
|
|                      | e      | 12:17 PM    |                      | procedure are in the 
|
|                      |        | PST         |                      |  results section.    
|
+----------------------+--------+-------------+----------------------+----------------------
+
| CBC W/AUTO DIFF      | STAT   | 2019  |                      |   Results for this   
|
| (REFLEX TO MANUAL)   |        |  8:47 AM    |                      | procedure are in the 
|
|                      |        | PST         |                      |  results section.    
|
+----------------------+--------+-------------+----------------------+----------------------
+
| RENAL FUNCTION PANEL | STAT   | 2019  |                      |   Results for this   
|
|                      |        |  8:47 AM    |                      | procedure are in the 
|
|                      |        | PST         |                      |  results section.    
|
+----------------------+--------+-------------+----------------------+----------------------
+
| EKG STANDARD 12 LEAD | Routin | 2019  |                      |   Results for this   
|
|                      | e      |  5:50 AM    |                      | procedure are in the 
|
|                      |        | PST         |                      |  results section.    
|
 
+----------------------+--------+-------------+----------------------+----------------------
+
| POCT GLUCOSE         | Routin | 2019  |                      |   Results for this   
|
|                      | e      |  5:18 AM    |                      | procedure are in the 
|
|                      |        | PST         |                      |  results section.    
|
+----------------------+--------+-------------+----------------------+----------------------
+
| POCT GLUCOSE         | Routin | 2019  |                      |   Results for this   
|
|                      | e      | 10:42 PM    |                      | procedure are in the 
|
|                      |        | PST         |                      |  results section.    
|
+----------------------+--------+-------------+----------------------+----------------------
+
| POCT GLUCOSE         | Routin | 2019  |                      |   Results for this   
|
|                      | e      |  9:16 PM    |                      | procedure are in the 
|
|                      |        | PST         |                      |  results section.    
|
+----------------------+--------+-------------+----------------------+----------------------
+
| TROPONIN I           | Timed  | 2019  |                      |   Results for this   
|
|                      |        |  5:47 PM    |                      | procedure are in the 
|
|                      |        | PST         |                      |  results section.    
|
+----------------------+--------+-------------+----------------------+----------------------
+
| POCT GLUCOSE         | Routin | 2019  |                      |   Results for this   
|
|                      | e      |  4:56 PM    |                      | procedure are in the 
|
|                      |        | PST         |                      |  results section.    
|
+----------------------+--------+-------------+----------------------+----------------------
+
| EKG STANDARD 12 LEAD | STAT   | 2019  |                      |   Results for this   
|
|                      |        |  3:01 PM    |                      | procedure are in the 
|
|                      |        | PST         |                      |  results section.    
|
+----------------------+--------+-------------+----------------------+----------------------
+
| TROPONIN I           | Timed  | 2019  |                      |   Results for this   
|
|                      |        |  2:11 PM    |                      | procedure are in the 
|
|                      |        | PST         |                      |  results section.    
|
+----------------------+--------+-------------+----------------------+----------------------
+
| ED ISTAT TROPONIN    | STAT   | 2019  |                      |   Results for this   
|
 
|                      |        | 10:26 AM    |                      | procedure are in the 
|
|                      |        | PST         |                      |  results section.    
|
+----------------------+--------+-------------+----------------------+----------------------
+
| POC CARDIAC TROPONIN | Routin | 2019  |                      |   Results for this   
|
|                      | e      |  9:54 AM    |                      | procedure are in the 
|
|                      |        | PST         |                      |  results section.    
|
+----------------------+--------+-------------+----------------------+----------------------
+
| ED INFORMATION       | Routin | 2019  |                      |   Results for this   
|
| EXCHANGE             | e      |  9:47 AM    |                      | procedure are in the 
|
|                      |        | PST         |                      |  results section.    
|
+----------------------+--------+-------------+----------------------+----------------------
+
| EKG 12 LEAD UNIT     | Routin | 2019  |                      |   Results for this   
|
| PERFORMED            | e      |  9:44 AM    |                      | procedure are in the 
|
|                      |        | PST         |                      |  results section.    
|
+----------------------+--------+-------------+----------------------+----------------------
+
 in this encounter
 
 Results
 POCT glucose (2019 11:45 AM)
 
+--------------------+--------------------------+---------------+-----------------+
| Component          | Value                    | Ref Range     | Performed At    |
+--------------------+--------------------------+---------------+-----------------+
| GLUCOSE,POC SCREEN | 225 (H)Comment: Testing  | 65 - 99 mg/dL | [facility] |
|                    | [place] [facility] [place]     |               |                 |
|                    | [address];[place]   |               |                 |
|                    | [id number]                    |               |                 |
+--------------------+--------------------------+---------------+-----------------+
 
 
 
+-------------------+------------------+--------------------+--------------+
| Performing        | Address          | City/State/Zipcode | Phone Number |
| Organization      |                  |                    |              |
+-------------------+------------------+--------------------+--------------+
|   [facility] |   [address] | [place] [address] |              |
+-------------------+------------------+--------------------+--------------+
 CBC w/manual diff (2019  5:54 AM)
 
+----------------------+--------------------------------------------------------------------
-----+-------------------+--------------+
| Component            | Value                                                              
     | Ref Range         | Performed At |
+----------------------+--------------------------------------------------------------------
-----+-------------------+--------------+
 
| WBC                  | 4.08                                                               
     | 3.80 - 11.00 K/uL | [facility]   |
|                      |                                                                    
     |                   | LABORATORY   |
+----------------------+--------------------------------------------------------------------
-----+-------------------+--------------+
| RBC                  | 2.83 (L)                                                           
     | 3.70 - 5.10 M/uL  | [facility]   |
|                      |                                                                    
     |                   | LABORATORY   |
+----------------------+--------------------------------------------------------------------
-----+-------------------+--------------+
| HGB                  | 9.7 (L)                                                            
     | 11.3 - 15.5 g/dL  | [facility]   |
|                      |                                                                    
     |                   | LABORATORY   |
+----------------------+--------------------------------------------------------------------
-----+-------------------+--------------+
| HCT                  | 29.7 (L)                                                           
     | 34.0 - 46.0 %     | [facility]   |
|                      |                                                                    
     |                   | LABORATORY   |
+----------------------+--------------------------------------------------------------------
-----+-------------------+--------------+
| MCV                  | 105.0 (H)                                                          
     | 80.0 - 100.0 fl   | [facility]   |
|                      |                                                                    
     |                   | LABORATORY   |
+----------------------+--------------------------------------------------------------------
-----+-------------------+--------------+
| MCH                  | 34.2 (H)                                                           
     | 27.0 - 34.0 pg    | [facility]   |
|                      |                                                                    
     |                   | LABORATORY   |
+----------------------+--------------------------------------------------------------------
-----+-------------------+--------------+
| MCHC                 | 32.5                                                               
     | 32.0 - 35.5 g/dL  | [facility]   |
|                      |                                                                    
     |                   | LABORATORY   |
+----------------------+--------------------------------------------------------------------
-----+-------------------+--------------+
| RDW SD               | 64.3 (H)                                                           
     | 37 - 53 fl        | [facility]   |
|                      |                                                                    
     |                   | LABORATORY   |
+----------------------+--------------------------------------------------------------------
-----+-------------------+--------------+
| PLT                  | 125 (L)                                                            
     | 150 - 400 K/uL    | [facility]   |
|                      |                                                                    
     |                   | LABORATORY   |
+----------------------+--------------------------------------------------------------------
-----+-------------------+--------------+
| MPV                  | 9.5                                                                
     | fl                | [facility]   |
|                      |                                                                    
     |                   | LABORATORY   |
+----------------------+--------------------------------------------------------------------
-----+-------------------+--------------+
 
| DIFF TYPE            | MANUAL                                                             
     |                   | [facility]   |
|                      |                                                                    
     |                   | LABORATORY   |
+----------------------+--------------------------------------------------------------------
-----+-------------------+--------------+
| Neutrophils Manual   | 77                                                                 
     | %                 | [facility]   |
|                      |                                                                    
     |                   | LABORATORY   |
+----------------------+--------------------------------------------------------------------
-----+-------------------+--------------+
| Lymphocytes Manual   | 15                                                                 
     | %                 | [facility]   |
|                      |                                                                    
     |                   | LABORATORY   |
+----------------------+--------------------------------------------------------------------
-----+-------------------+--------------+
| Monocytes Manual     | 8                                                                  
     | %                 | [facility]   |
|                      |                                                                    
     |                   | LABORATORY   |
+----------------------+--------------------------------------------------------------------
-----+-------------------+--------------+
| Neutrophils Absolute | 3.14                                                               
     | 1.90 - 7.40 K/uL  | [facility]   |
|                      |                                                                    
     |                   | LABORATORY   |
+----------------------+--------------------------------------------------------------------
-----+-------------------+--------------+
| Lymphocytes Absolute | 0.61 (L)                                                           
     | 1.00 - 3.90 K/uL  | [facility]   |
|                      |                                                                    
     |                   | LABORATORY   |
+----------------------+--------------------------------------------------------------------
-----+-------------------+--------------+
| Monocytes Absolute   | 0.33                                                               
     | 0.00 - 0.80 K/uL  | [facility]   |
|                      |                                                                    
     |                   | LABORATORY   |
+----------------------+--------------------------------------------------------------------
-----+-------------------+--------------+
| MORPHOLOGY           | 1+Comment:                                                         
     |                   | [facility]   |
|                      | ANISO1+HYPONORMAL PLT                                              
     |                   | LABORATORY   |
|                      | MORPHTesting performed                                             
     |                   |              |
|                      | at [facility], [address]                                                     
     |                   |              |
|                      | [facility],                                                   
     |                   |              |
|                      | [place]    [address]                                             
     |                   |              |
|                      |Testing performed at [facility], [address], [place]    [phone] |                   |              |
|                      |                                                                    
     |                   |              |
+----------------------+--------------------------------------------------------------------
-----+-------------------+--------------+
 
 
 
 
+---------------+-------------------------+---------------------+----------------+
| Performing    | Address                 | City/State/Zipcode  | Phone Number   |
| Organization  |                         |                     |                |
+---------------+-------------------------+---------------------+----------------+
|   [facility]  |   [address]  | [place] [address] |   [phone] |
| LABORATORY    | [place]                   |                     |                |
+---------------+-------------------------+---------------------+----------------+
 Sedimentation rate, automated (2019  5:54 AM)
 
+-----------+--------------------------+--------------+--------------+
| Component | Value                    | Ref Range    | Performed At |
+-----------+--------------------------+--------------+--------------+
| ESR       | 24Comment: Testing       | 0 - 30 mm/Hr | [facility]   |
|           | performed at [facility], [address] |              | LABORATORY   |
|           |  [address],        |              |              |
|           | [place]  [id number]     |              |              |
+-----------+--------------------------+--------------+--------------+
 
 
 
+----------+
| Specimen |
+----------+
| Blood    |
+----------+
 
 
 
+---------------+-------------------------+---------------------+----------------+
| Performing    | Address                 | City/State/Zipcode  | Phone Number   |
| Organization  |                         |                     |                |
+---------------+-------------------------+---------------------+----------------+
|   [facility]  |   [address] [place] [address]  | [place] [address] |   [phone] |
| LABORATORY    | [address]                   |                     |                |
+---------------+-------------------------+---------------------+----------------+
 C-reactive protein (2019  5:54 AM)
 
+-----------+--------------------------+------------+--------------+
| Component | Value                    | Ref Range  | Performed At |
+-----------+--------------------------+------------+--------------+
| CRP       | <0.3Comment: Testing     | <0.5 mg/dL | [facility]   |
|           | performed at [facility], [address] |            | LABORATORY   |
|           |  [facility],        |            |              |
|           | [place]  [address]     |            |              |
+-----------+--------------------------+------------+--------------+
 
 
 
+----------+
| Specimen |
+----------+
| Blood    |
+----------+
 
 
 
+---------------+-------------------------+---------------------+----------------+
 
| Performing    | Address                 | City/State/Zipcode  | Phone Number   |
| Organization  |                         |                     |                |
+---------------+-------------------------+---------------------+----------------+
|   [facility]  |   [address]  | [place] [address] |   [phone] |
| LABORATORY    | [address]                   |                     |                |
+---------------+-------------------------+---------------------+----------------+
 Comprehensive metabolic panel (2019  5:54 AM)
 
+----------------+--------------------------+-------------------+--------------+
| Component      | Value                    | Ref Range         | Performed At |
+----------------+--------------------------+-------------------+--------------+
| SODIUM         | 138                      | 135 - 145 mmol/L  | [facility]   |
|                |                          |                   | LABORATORY   |
+----------------+--------------------------+-------------------+--------------+
| POTASSIUM      | 4.4                      | 3.5 - 4.9 mmol/L  | [facility]   |
|                |                          |                   | LABORATORY   |
+----------------+--------------------------+-------------------+--------------+
| CHLORIDE       | 100                      | 99 - 109 mmol/L   | [facility]   |
|                |                          |                   | LABORATORY   |
+----------------+--------------------------+-------------------+--------------+
| CO2            | 28                       | 23 - 32 mmol/L    | [facility]   |
|                |                          |                   | LABORATORY   |
+----------------+--------------------------+-------------------+--------------+
| ANION GAP AGAP | 14                       | 5 - 20 mmol/L     | [facility]   |
|                |                          |                   | LABORATORY   |
+----------------+--------------------------+-------------------+--------------+
| GLUCOSE        | 153 (H)                  | 65 - 99 mg/dL     | [facility]   |
|                |                          |                   | LABORATORY   |
+----------------+--------------------------+-------------------+--------------+
| BUN            | 15                       | 8 - 25 mg/dL      | [facility]   |
|                |                          |                   | LABORATORY   |
+----------------+--------------------------+-------------------+--------------+
| CREATININE     | 4.9 (H)                  | 0.50 - 1.00 mg/dL | [facility]   |
|                |                          |                   | LABORATORY   |
+----------------+--------------------------+-------------------+--------------+
| BUN/CREAT      | 3                        |                   | [facility]   |
|                |                          |                   | LABORATORY   |
+----------------+--------------------------+-------------------+--------------+
| CALCIUM        | 9.6                      | 8.5 - 10.5 mg/dL  | [facility]   |
|                |                          |                   | LABORATORY   |
+----------------+--------------------------+-------------------+--------------+
| TOTAL PROTEIN  | 6.1 (L)                  | 6.3 - 8.2 g/dL    | [facility]   |
|                |                          |                   | LABORATORY   |
+----------------+--------------------------+-------------------+--------------+
| Albumin        | 2.6 (L)                  | 3.3 - 4.8 g/dL    | [facility]   |
|                |                          |                   | LABORATORY   |
+----------------+--------------------------+-------------------+--------------+
| GLOBULIN       | 3.5                      | 1.3 - 4.9 g/dL    | [facility]   |
|                |                          |                   | LABORATORY   |
+----------------+--------------------------+-------------------+--------------+
| A/G            | 0.7 (L)                  | 1.0 - 2.4         | [facility]   |
|                |                          |                   | LABORATORY   |
+----------------+--------------------------+-------------------+--------------+
| TBIL           | 0.5                      | 0.1 - 1.5 mg/dL   | [facility]   |
|                |                          |                   | LABORATORY   |
+----------------+--------------------------+-------------------+--------------+
| ALK PHOS       | 85                       | 35 - 115 U/L      | [facility]   |
|                |                          |                   | LABORATORY   |
+----------------+--------------------------+-------------------+--------------+
| AST            | 22                       | 10 - 45 U/L       | [facility]   |
 
|                |                          |                   | LABORATORY   |
+----------------+--------------------------+-------------------+--------------+
| ALT            | 22                       | 10 - 65 U/L       | [facility]   |
|                |                          |                   | LABORATORY   |
+----------------+--------------------------+-------------------+--------------+
| EGFR           | 9 (L)Comment: GFR <60:   | >60 mL/min/1.73m2 | [facility]   |
|                | CHRONIC KIDNEY DISEASE,  |                   | LABORATORY   |
|                | IF FOUND OVER A 3 MONTH  |                   |              |
|                | PERIOD.GFR <15: KIDNEY   |                   |              |
|                | FAILURE.FOR       |                   |              |
|                | [employer], MULTIPLY THE  |                   |              |
|                | CALCULATED GFR BY        |                   |              |
|                | 1.210.This eGFR is       |                   |              |
|                | calculated using the     |                   |              |
|                | MDRD IDMS traceable      |                   |              |
|                | equation.Testing         |                   |              |
|                | performed at [facility], [address] |                   |              |
|                |  [facility],        |                   |              |
|                | [place]    [address]   |                   |              |
+----------------+--------------------------+-------------------+--------------+
 
 
 
+----------+
| Specimen |
+----------+
| Blood    |
+----------+
 
 
 
+---------------+-------------------------+---------------------+----------------+
| Performing    | Address                 | City/State/Zipcode  | Phone Number   |
| Organization  |                         |                     |                |
+---------------+-------------------------+---------------------+----------------+
|   [facility]  |   [address] [place] [address]  | [place] [address] |   [phone] |
| LABORATORY    | [place]                   |                     |                |
+---------------+-------------------------+---------------------+----------------+
 POCT glucose (2019  5:22 AM)
 
+--------------------+--------------------------+---------------+-----------------+
| Component          | Value                    | Ref Range     | Performed At    |
+--------------------+--------------------------+---------------+-----------------+
| GLUCOSE,POC SCREEN | 131 (H)Comment: Testing  | 65 - 99 mg/dL | [facility]AT[facility] |
|                    | performed [place] [facility] [place]     |               |                 |
|                    | [address];[place]   |               |                 |
|                    | [id number]                    |               |                 |
+--------------------+--------------------------+---------------+-----------------+
 
 
 
+-------------------+------------------+--------------------+--------------+
| Performing        | Address          | City/State/Zipcode | Phone Number |
| Organization      |                  |                    |              |
+-------------------+------------------+--------------------+--------------+
|   [facility] |   [address] | [place] [address] |              |
+-------------------+------------------+--------------------+--------------+
 POCT glucose (2019  9:02 PM)
 
+--------------------+--------------------------+---------------+-----------------+
 
| Component          | Value                    | Ref Range     | Performed At    |
+--------------------+--------------------------+---------------+-----------------+
| GLUCOSE,POC SCREEN | 164 (H)Comment: Testing  | 65 - 99 mg/dL | [facility] |
|                    | performed at [facility] [place]     |               |                 |
|                    | [address];[place], [place]   |               |                 |
|                    | [id number]                    |               |                 |
+--------------------+--------------------------+---------------+-----------------+
 
 
 
+-------------------+------------------+--------------------+--------------+
| Performing        | Address          | City/State/Zipcode | Phone Number |
| Organization      |                  |                    |              |
+-------------------+------------------+--------------------+--------------+
|   [facility] |   [address] | [place] [address] |              |
+-------------------+------------------+--------------------+--------------+
 POCT glucose (2019  4:16 PM)
 
+--------------------+--------------------------+---------------+-----------------+
| Component          | Value                    | Ref Range     | Performed At    |
+--------------------+--------------------------+---------------+-----------------+
| GLUCOSE,POC SCREEN | 143 (H)Comment: Testing  | 65 - 99 mg/dL | [facility] |
|                    | performed at [facility] [place]     |               |                 |
|                    | [address];[place]   |               |                 |
|                    | [id number]                    |               |                 |
+--------------------+--------------------------+---------------+-----------------+
 
 
 
+-------------------+------------------+--------------------+--------------+
| Performing        | Address          | City/State/Zipcode | Phone Number |
| Organization      |                  |                    |              |
+-------------------+------------------+--------------------+--------------+
|   [facility] |   [address] | [place] [address] |              |
+-------------------+------------------+--------------------+--------------+
 POCT glucose (2019 12:17 PM)
 
+--------------------+--------------------------+---------------+-----------------+
| Component          | Value                    | Ref Range     | Performed At    |
+--------------------+--------------------------+---------------+-----------------+
| GLUCOSE,POC SCREEN | 129 (H)Comment: Testing  | 65 - 99 mg/dL | [facility] |
|                    | performed [place] [facility] [place]     |               |                 |
|                    | [address];[place]   |               |                 |
|                    | [id number]                    |               |                 |
+--------------------+--------------------------+---------------+-----------------+
 
 
 
+-------------------+------------------+--------------------+--------------+
| Performing        | Address          | City/State/Zipcode | Phone Number |
| Organization      |                  |                    |              |
+-------------------+------------------+--------------------+--------------+
|   [facility] |   [address] | [facility] [place] [facility] [place] [address] |              |
+-------------------+------------------+--------------------+--------------+
 Renal function panel (2019  8:47 AM)
 
+----------------+--------------------------+-------------------+-----------------+
| Component      | Value                    | Ref Range         | Performed At    |
+----------------+--------------------------+-------------------+-----------------+
| SODIUM         | 139                      | 135 - 145 mmol/L  | [facility] |
 
+----------------+--------------------------+-------------------+-----------------+
| POTASSIUM      | 4.4                      | 3.5 - 4.9 mmol/L  | [facility] |
+----------------+--------------------------+-------------------+-----------------+
| CHLORIDE       | 99                       | 99 - 109 mmol/L   | [facility] |
+----------------+--------------------------+-------------------+-----------------+
| CO2            | 32                       | 23 - 32 mmol/L    | [facility] |
+----------------+--------------------------+-------------------+-----------------+
| ANION GAP AGAP | 12                       | 5 - 20 mmol/L     | [facility] |
+----------------+--------------------------+-------------------+-----------------+
| GLUCOSE        | 197 (H)                  | 65 - 99 mg/dL     | [facility] |
+----------------+--------------------------+-------------------+-----------------+
| BUN            | 24                       | 8 - 25 mg/dL      | [facility] |
+----------------+--------------------------+-------------------+-----------------+
| CREATININE     | 6.5 (H)                  | 0.50 - 1.00 mg/dL | [facility] |
+----------------+--------------------------+-------------------+-----------------+
| CALCIUM        | 8.7                      | 8.5 - 10.5 mg/dL  | [facility] |
+----------------+--------------------------+-------------------+-----------------+
| Albumin        | 2.2 (L)                  | 3.3 - 4.8 g/dL    | [facility] |
+----------------+--------------------------+-------------------+-----------------+
| PHOSPHORUS     | 4.4                      | 2.3 - 4.8 mg/dL   | [facility] |
+----------------+--------------------------+-------------------+-----------------+
| EGFR           | 6 (L)Comment: GFR <60:   | >60 mL/min/1.73m2 | [facility] |
|                | CHRONIC KIDNEY DISEASE,  |                   |                 |
|                | IF FOUND OVER A 3 MONTH  |                   |                 |
|                | PERIOD.GFR <15: KIDNEY   |                   |                 |
|                | FAILURE.FOR       |                   |                 |
|                | AMERIC[employer], MULTIPLY THE  |                   |                 |
|                | CALCULATED GFR BY        |                   |                 |
|                | 1.210.This eGFR is       |                   |                 |
|                | calculated using the     |                   |                 |
|                | MDRD IDMS traceable      |                   |                 |
|                | equation.Testing         |                   |                 |
|                | performed [place] [facility] [place]     |                   |                 |
|                | [facility];[place], [place]   |                   |                 |
|                | [address]                    |                   |                 |
+----------------+--------------------------+-------------------+-----------------+
 
 
 
+----------+
| Specimen |
+----------+
| Blood    |
+----------+
 
 
 
+-------------------+------------------+--------------------+--------------+
| Performing        | Address          | City/State/Zipcode | Phone Number |
| Organization      |                  |                    |              |
+-------------------+------------------+--------------------+--------------+
|   [facility] |   [address] | [place] [address] |              |
+-------------------+------------------+--------------------+--------------+
 CBC w/auto diff (reflex to manual) (2019  8:47 AM)
 
+-----------------+-------------------------+-------------------+-----------------+
| Component       | Value                   | Ref Range         | Performed At    |
+-----------------+-------------------------+-------------------+-----------------+
| WBC             | 4.23                    | 3.80 - 11.00 K/uL | [facility] |
+-----------------+-------------------------+-------------------+-----------------+
 
| RBC             | 2.50 (L)                | 3.70 - 5.10 M/uL  | [facility] |
+-----------------+-------------------------+-------------------+-----------------+
| HGB             | 8.5 (L)                 | 11.3 - 15.5 g/dL  | [facility] |
+-----------------+-------------------------+-------------------+-----------------+
| HCT             | 26.0 (L)                | 34.0 - 46.0 %     | [facility] |
+-----------------+-------------------------+-------------------+-----------------+
| MCV             | 103.8 (H)               | 80.0 - 100.0 fl   | [facility] |
+-----------------+-------------------------+-------------------+-----------------+
| MCH             | 33.9                    | 27.0 - 34.0 pg    | [facility] |
+-----------------+-------------------------+-------------------+-----------------+
| MCHC            | 32.6                    | 32.0 - 35.5 g/dL  | [facility] |
+-----------------+-------------------------+-------------------+-----------------+
| RDW SD          | 66.1 (H)                | 37 - 53 fl        | [facility] |
+-----------------+-------------------------+-------------------+-----------------+
| PLT             | 134 (L)                 | 150 - 400 K/uL    | [facility] |
+-----------------+-------------------------+-------------------+-----------------+
| MPV             | 8.7                     | fl                | [facility] |
+-----------------+-------------------------+-------------------+-----------------+
| DIFF TYPE       | AUTOMATED               |                   | [facility] |
+-----------------+-------------------------+-------------------+-----------------+
| NEUTROPHILS     | 74.02                   | %                 | [facility] |
+-----------------+-------------------------+-------------------+-----------------+
| LYMPHOCYTES     | 16.94                   | %                 | [facility] |
+-----------------+-------------------------+-------------------+-----------------+
| MONOCYTES       | 7.65                    | %                 | [facility] |
+-----------------+-------------------------+-------------------+-----------------+
| EOSINOPHILS     | 0.00                    | %                 | [facility] |
+-----------------+-------------------------+-------------------+-----------------+
| BASOPHILS       | 1.39                    | %                 | [facility] |
+-----------------+-------------------------+-------------------+-----------------+
| NEUTROPHILS ABS | 3.13                    | 1.90 - 7.40 K/uL  | [facility] |
+-----------------+-------------------------+-------------------+-----------------+
| LYMPHOCYTES ABS | 0.72 (L)                | 1.00 - 3.90 K/uL  | [facility] |
+-----------------+-------------------------+-------------------+-----------------+
| MONOCYTES ABS   | 0.32                    | 0.00 - 0.80 K/uL  | [facility] |
+-----------------+-------------------------+-------------------+-----------------+
| EOSINOPHILS ABS | 0.00                    | 0.00 - 0.50 K/uL  | [facility] |
+-----------------+-------------------------+-------------------+-----------------+
| BASOPHILS ABS   | 0.06Comment: Testing    | 0.00 - 0.10 K/uL  | [facility] |
|                 | performed [place] [facility] [place]    |                   |                 |
|                 | [address];[place]  |                   |                 |
|                 | [id number]                   |                   |                 |
+-----------------+-------------------------+-------------------+-----------------+
 
 
 
+----------+
| Specimen |
+----------+
| Blood    |
+----------+
 
 
 
+-------------------+------------------+--------------------+--------------+
| Performing        | Address          | City/State/Zipcode | Phone Number |
| Organization      |                  |                    |              |
+-------------------+------------------+--------------------+--------------+
|   [facility] |   [address] | [place] [address] |              |
+-------------------+------------------+--------------------+--------------+
 
 EKG STANDARD 12 LEAD (2019  5:50 AM)
 
+-------------------+--------------------------+-----------+--------------+
| Component         | Value                    | Ref Range | Performed At |
+-------------------+--------------------------+-----------+--------------+
| Ventricular Rate  | 87                       | BPM       | [facility]     |
+-------------------+--------------------------+-----------+--------------+
| Atrial Rate       | 87                       | BPM       | [facility]     |
+-------------------+--------------------------+-----------+--------------+
| P-R Interval      | 138                      | ms        | [facility]     |
+-------------------+--------------------------+-----------+--------------+
| QRS Duration      | 122                      | ms        | [facility]     |
+-------------------+--------------------------+-----------+--------------+
| Q-T Interval      | 420                      | ms        | [facility]     |
+-------------------+--------------------------+-----------+--------------+
| QTC Calculation   | 505                      | ms        | [facility]     |
| (Bezet)           |                          |           |              |
+-------------------+--------------------------+-----------+--------------+
| Calculated P Axis | 69                       | degrees   | [facility]     |
+-------------------+--------------------------+-----------+--------------+
| Calculated R Axis | -28                      | degrees   | [facility]     |
+-------------------+--------------------------+-----------+--------------+
| Calculated T Axis | 67                       | degrees   | [facility]     |
+-------------------+--------------------------+-----------+--------------+
| Diagnosis         | Normal sinus             |           | [facility]     |
|                   | rhythmNon-specific       |           |              |
|                   | intra-ventricular        |           |              |
|                   | conduction               |           |              |
|                   | delayNonspecific ST      |           |              |
|                   | abnormalityPoor R -      |           |              |
|                   | progressionWhen compared |           |              |
|                   |  with ECG of 2019 |           |              |
|                   |  15:01,Premature         |           |              |
|                   | ventricular complexes    |           |              |
|                   | are no longer PresentT   |           |              |
|                   | wave inversion no longer |           |              |
|                   |  evident in Inferior     |           |              |
|                   | leadsConfirmed by JOVITA,  |           |              |
|                   | JACEY ([id number]) on 2019 |           |              |
|                   |  4:19:02 PM              |           |              |
+-------------------+--------------------------+-----------+--------------+
 
 
 
+--------------+-------------------+--------------------+--------------+
| Performing   | Address           | City/State/Zipcode | Phone Number |
| Organization |                   |                    |              |
+--------------+-------------------+--------------------+--------------+
|   [facility]   |   [address] | [place] [address] |              |
+--------------+-------------------+--------------------+--------------+
 POCT glucose (2019  5:18 AM)
 
+--------------------+--------------------------+---------------+-----------------+
| Component          | Value                    | Ref Range     | Performed At    |
+--------------------+--------------------------+---------------+-----------------+
| GLUCOSE,POC SCREEN | 167 (H)Comment: Testing  | 65 - 99 mg/dL | [facility] |
|                    | performed [place] [facility] [place]     |               |                 |
|                    | [address];[place]   |               |                 |
|                    | [id number]                    |               |                 |
+--------------------+--------------------------+---------------+-----------------+
 
 
 
 
+-------------------+------------------+--------------------+--------------+
| Performing        | Address          | City/State/Zipcode | Phone Number |
| Organization      |                  |                    |              |
+-------------------+------------------+--------------------+--------------+
|   [facility] |   [address] | [place] [address] |              |
+-------------------+------------------+--------------------+--------------+
 POCT glucose (2019 10:42 PM)
 
+--------------------+--------------------------+---------------+-----------------+
| Component          | Value                    | Ref Range     | Performed At    |
+--------------------+--------------------------+---------------+-----------------+
| GLUCOSE,POC SCREEN | 207 (H)Comment: Testing  | 65 - 99 mg/dL | [facility] |
|                    | performed [place] [facility] [place]     |               |                 |
|                    | [address];[place]   |               |                 |
|                    | [id number]                    |               |                 |
+--------------------+--------------------------+---------------+-----------------+
 
 
 
+-------------------+------------------+--------------------+--------------+
| Performing        | Address          | City/State/Zipcode | Phone Number |
| Organization      |                  |                    |              |
+-------------------+------------------+--------------------+--------------+
|   [facility] |   [address] | [place] [address] |              |
+-------------------+------------------+--------------------+--------------+
 POCT glucose (2019  9:16 PM)
 
+--------------------+--------------------------+---------------+-----------------+
| Component          | Value                    | Ref Range     | Performed At    |
+--------------------+--------------------------+---------------+-----------------+
| GLUCOSE,POC SCREEN | 213 (H)Comment: Testing  | 65 - 99 mg/dL | [facility] |
|                    | performed at [facility] [place]     |               |                 |
|                    | [address];[place]   |               |                 |
|                    | [address]                    |               |                 |
+--------------------+--------------------------+---------------+-----------------+
 
 
 
+-------------------+------------------+--------------------+--------------+
| Performing        | Address          | City/State/Zipcode | Phone Number |
| Organization      |                  |                    |              |
+-------------------+------------------+--------------------+--------------+
|   [facility] |   [address] | [facility] [place] [address] |              |
+-------------------+------------------+--------------------+--------------+
 Troponin I (2019  5:47 PM)
 
+------------+--------------------------+-------------------+-----------------+
| Component  | Value                    | Ref Range         | Performed At    |
+------------+--------------------------+-------------------+-----------------+
| TROPONIN I | 0.061Comment:   0.00 to  | 0.00 - 0.10 ng/mL | [facility] |
|            | 0.10     CONSISTENT WITH |                   |                 |
|            |  NORMAL POPULATION0.11   |                   |                 |
|            | to 0.60     CONSISTENT   |                   |                 |
|            | WITH INCREASED RISK FOR  |                   |                 |
|            | ADVERSE OUTCOMES>        |                   |                 |
|            | 0.60                     |                   |                 |
|            | CONSISTENT WITH WHO      |                   |                 |
 
|            | CRITERIA FOR ACUTE MI    |                   |                 |
|            | Testing performed at     |                   |                 |
|            | [facility] [place]                   |                 |
|            [address];[place] [address]   |                   |                 |
+------------+--------------------------+-------------------+-----------------+
 
 
 
+----------+
| Specimen |
+----------+
| Blood    |
+----------+
 
 
 
+-------------------+------------------+--------------------+--------------+
| Performing        | Address          | City/State/Zipcode | Phone Number |
| Organization      |                  |                    |              |
+-------------------+------------------+--------------------+--------------+
|   [facility] |   [address] | [place] [address] |              |
+-------------------+------------------+--------------------+--------------+
 POCT glucose (2019  4:56 PM)
 
+--------------------+--------------------------+---------------+-----------------+
| Component          | Value                    | Ref Range     | Performed At    |
+--------------------+--------------------------+---------------+-----------------+
| GLUCOSE,POC SCREEN | 133 (H)Comment: Testing  | 65 - 99 mg/dL | [facility] |
|                    | [place] [facility] [place]     |               |                 |
|                    | [address];[place]   |               |                 |
|                    | [id number]                    |               |                 |
+--------------------+--------------------------+---------------+-----------------+
 
 
 
+-------------------+------------------+--------------------+--------------+
| Performing        | Address          | City/State/Zipcode | Phone Number |
| Organization      |                  |                    |              |
+-------------------+------------------+--------------------+--------------+
|   [facility] |   [address] | [place] [address] |              |
+-------------------+------------------+--------------------+--------------+
 EKG STANDARD 12 LEAD (2019  3:01 PM)
 
+-------------------+--------------------------+-----------+--------------+
| Component         | Value                    | Ref Range | Performed At |
+-------------------+--------------------------+-----------+--------------+
| Ventricular Rate  | 86                       | BPM       | [facility]     |
+-------------------+--------------------------+-----------+--------------+
| Atrial Rate       | 86                       | BPM       | [facility]     |
+-------------------+--------------------------+-----------+--------------+
| P-R Interval      | 138                      | ms        | [facility]     |
+-------------------+--------------------------+-----------+--------------+
| QRS Duration      | 120                      | ms        | [facility]     |
+-------------------+--------------------------+-----------+--------------+
| Q-T Interval      | 450                      | ms        | [facility]     |
+-------------------+--------------------------+-----------+--------------+
| QTC Calculation   | 538                      | ms        | [facility]     |
| (Bezet)           |                          |           |              |
+-------------------+--------------------------+-----------+--------------+
| Calculated P Axis | 60                       | degrees   | [facility]     |
 
+-------------------+--------------------------+-----------+--------------+
| Calculated R Axis | -31                      | degrees   | [facility]     |
+-------------------+--------------------------+-----------+--------------+
| Calculated T Axis | -32                      | degrees   | [facility]     |
+-------------------+--------------------------+-----------+--------------+
| Diagnosis         | Sinus rhythm with        |           | [facility]     |
|                   | frequent Premature       |           |              |
|                   | ventricular              |           |              |
|                   | complexesLeft axis       |           |              |
|                   | deviationLeft bundle     |           |              |
|                   | branch block,            |           |              |
|                   | completeNonspecific ST   |           |              |
|                   | and T wave               |           |              |
|                   | abnormalityAbnormal      |           |              |
|                   | ECGWhen compared with    |           |              |
|                   | ECG of 2019       |           |              |
|                   | 09:44,Premature          |           |              |
|                   | ventricular complexes    |           |              |
|                   | are now PresentT wave    |           |              |
|                   | inversion now evident in |           |              |
|                   |  Inferior leadsConfirmed |           |              |
|                   |  by JACEY HUSSEIN (209) on |           |              |
|                   |  2019 4:46:52 PM    |           |              |
+-------------------+--------------------------+-----------+--------------+
 
 
 
+--------------+-------------------+--------------------+--------------+
| Performing   | Address           | City/State/Zipcode | Phone Number |
| Organization |                   |                    |              |
+--------------+-------------------+--------------------+--------------+
|   [facility]   |   [address] | [place] [address] |              |
+--------------+-------------------+--------------------+--------------+
 Troponin I (2019  2:11 PM)
 
+------------+--------------------------+-------------------+-----------------+
| Component  | Value                    | Ref Range         | Performed At    |
+------------+--------------------------+-------------------+-----------------+
| TROPONIN I | 0.075Comment:   0.00 to  | 0.00 - 0.10 ng/mL | [facility] |
|            | 0.10     CONSISTENT WITH |                   |                 |
|            |  NORMAL POPULATION0.11   |                   |                 |
|            | to 0.60     CONSISTENT   |                   |                 |
|            | WITH INCREASED RISK FOR  |                   |                 |
|            | ADVERSE OUTCOMES>        |                   |                 |
|            | 0.60                     |                   |                 |
|            | CONSISTENT WITH WHO      |                   |                 |
|            | CRITERIA FOR ACUTE MI    |                   |                 |
|            | Testing performed at     |                   |                 |
|            | [facility] [place]            |                   |                 |
|            | [address];[place] [address]   |                   |                 |
+------------+--------------------------+-------------------+-----------------+
 
 
 
+----------+
| Specimen |
+----------+
| Blood    |
+----------+
 
 
 
 
+-------------------+------------------+--------------------+--------------+
| Performing        | Address          | City/State/Zipcode | Phone Number |
| Organization      |                  |                    |              |
+-------------------+------------------+--------------------+--------------+
|   [facility] |   [address] | [place] [address] |              |
+-------------------+------------------+--------------------+--------------+
 ISTAT Troponin (2019 10:26 AM)
 
+----------------+-------+-----------+--------------+
| Component      | Value | Ref Range | Performed At |
+----------------+-------+-----------+--------------+
| POC Troponin I | 0.04  |           |              |
+----------------+-------+-----------+--------------+
 POC cardiac troponin (2019  9:54 AM)
 
+----------------------+--------------------------+-------------------+-----------------+
| Component            | Value                    | Ref [facility]         | Performed At    |
+----------------------+--------------------------+-------------------+-----------------+
| POC CARDIAC TROPONIN | 0.04Comment:   0.00 to   | 0.00 - 0.10 ng/mL | [facility] |
|                      | 0.10    Consistent with  |                   |                 |
|                      | normal population0.11 to |                   |                 |
|                      |  0.40    Consistent with |                   |                 |
|                      |  increased risk for      |                   |                 |
|                      | adverse                  |                   |                 |
|                      | outcomes>0.40            |                   |                 |
|                      |       Consistent with    |                   |                 |
|                      | WHO criteria for Acute   |                   |                 |
|                      | MI Testing performed at  |                   |                 |
|                      | [facility] [place] [place] [address]            |                   |                 |
|                      | [facility];[place] [address]   |                   |                 |
+----------------------+--------------------------+-------------------+-----------------+
 
 
 
+-------------------+------------------+--------------------+--------------+
| Performing        | Address          | City/State/Zipcode | Phone Number |
| Organization      |                  |                    |              |
+-------------------+------------------+--------------------+--------------+
|   [facility] |   [address] | [place] [address] |              |
+-------------------+------------------+--------------------+--------------+
 ED INFORMATION EXCHANGE (2019  9:47 AM)
 
+------------------------------------------------------------------------+--------------+
| Narrative                                                              | Performed At |
+------------------------------------------------------------------------+--------------+
|   [id number]     This patient has registered at the     |   ED         |
| [facility] Emergency Department   For more         | INFORMATION  |
| information visit:                                                     | EXCHANGE     |
| [email or web address] |              |
| [id number]   Security Events  No recent Security Events currently on file  |              |
|     ED Care Guidelines from [facility]  Last Updated: 18 |              |
|  10:16 AM         Other Information:  Currently being seen by [facility] |              |
|  in [place] for mental health concerns.[phone]  These are       |              |
| guidelines and the provider should exercise clinical judgment when     |              |
| providing care.  Additional plans are also available online from the   |              |
| following facilities: [facility]   Recent Emergency          |              |
| Department Visit Summary  Admit Date Facility City State Type Major    |              |
| Type Diagnoses or Chief Complaint   2019 [facility]  |              |
 
| [place] Emergency    Emergency          Chest Pain        Nausea     |              |
|      Shortness of Breath      2019 [facility] [place] |              |
|  [place] Emergency    Emergency          CHEST PAIN        Abnormal levels  |              |
| of other serum enzymes        Personal history of other diseases of    |              |
| the circulatory system        Chest pain, unspecified      2019 |              |
|  [facility] [place] Emergency    Emergency                 |              |
|     FISTULA BLEEDING        Hemorrhage due to vascular prosthetic      |              |
| devices, implants and grafts, initial encounter      Dec 22, 2018 [facility] |              |
|  [facility] [employer] [place] Emergency    Emergency          CHEST PAIN  |              |
|        Type 2 diabetes mellitus with hyperglycemia        Chest pain,  |              |
| unspecified      2018 [facility] [place]            |              |
| Emergency    Emergency    Chief Complaint: SOB      2018 [facility]  |              |
| [facility] Emergency    Emergency          FALL         |              |
| Unspecified fall, initial encounter        Dependence on renal         |              |
| dialysis        End stage renal disease      2018 [facility]         |              |
| [facility] Emergency    Emergency          FALL         |              |
| Essential (primary) hypertension        Type 2 diabetes mellitus with  |              |
| hyperglycemia        Type 2 diabetes mellitus with unspecified         |              |
| complications        Unspecified fall, initial encounter               |              |
| Headache      2018 [facility] [place]               |              |
| Emergency    Emergency          -1. Dorsalgia, unspecified        -1.  |              |
| Painful micturition, unspecified        0. Frequency of micturition    |              |
|      1. Urinary tract infection, site not specified        6. Type 2   |              |
| diabetes mellitus with hyperglycemia        7. Type 2 diabetes         |              |
| mellitus with diabetic chronic kidney disease        8. End stage      |              |
| renal disease        9. Dependence on renal dialysis        10.        |              |
| Shortness of breath        11. Long term (current) use of              |              |
| anticoagulants            E.D. Visit Count (12 mo.)  Facility Visits   |              |
| Low Acuity   [facility] 19 0   [facility] 2 0 |              |
|    [facility] 2 0   Total 23 0   Note: Visits      |              |
| indicate total known visits. Medicaid Low Acuity Dx are the number of  |              |
| primary diagnoses on the Medicaid's Low Acuity dx list.         Recent |              |
|  Inpatient Visit Summary  Admit Date Facility City State Type Major    |              |
| Type Diagnoses or Chief Complaint   [facility] [place] [place] [facility]    Inpatient          Peripheral arterial disease,  |              |
| osteomyelitis left foot        Other acute osteomyelitis, left ankle   |              |
| and foot        Long term (current) use of antibiotics        End      |              |
| stage renal disease        Anemia in chronic kidney disease      Dec   |              |
| [facility] [place] [place] [facility]                         |              |
| Medicine    Inpatient          Peripheral vascular disease,            |              |
| unspecified        End stage renal disease        Dependence on renal  |              |
| dialysis        Long term (current) use of insulin        Other        |              |
| chronic osteomyelitis, left ankle and foot        Type 2 diabetes      |              |
| mellitus with diabetic chronic kidney disease        Essential         |              |
| (primary) hypertension      2018 [facility] [place]   |              |
| [place] Inpatient    Inpatient          Upper GIB        Other chronic      |              |
| osteomyelitis, unspecified ankle and foot        End stage renal       |              |
| disease        Other specified personal risk factors, not elsewhere    |              |
| classified        Chronic systolic (congestive) heart failure          |              |
| Anemia in chronic kidney disease        Other disorders of             |              |
| plasma-protein metabolism, not elsewhere classified        Moderate    |              |
| protein-calorie malnutrition        Other disorders of phosphorus      |              |
| metabolism      2018 [facility]. [place] Medical     |              |
| Surgical    Inpatient          1. Type 2 diabetes mellitus with other  |              |
| specified complication        2. Urinary tract infection, site not     |              |
| specified        3. Unspecified protein-calorie malnutrition        4. |              |
|  Other chronic osteomyelitis, unspecified site        5. Hypertensive  |              |
| heart and chronic kidney disease with heart failure and with stage 5   |              |
| chronic kidney disease, or end stage renal disease        6. Chronic   |              |
| diastolic (congestive) heart failure        7. Other osteomyelitis,    |              |
 
| ankle and foot        8. Type 2 diabetes mellitus with foot ulcer      |              |
|      9. Atherosclerotic heart disease of native coronary artery        |              |
| without angina pectoris        10. Chronic obstructive pulmonary       |              |
| disease, unspecified      2018 [facility] [place]    |              |
| Medical Surgical    Inpatient          1. Benign paroxysmal vertigo,   |              |
| unspecified ear        2. End stage renal disease        3.            |              |
| Hypertensive chronic kidney disease with stage 5 chronic kidney        |              |
| disease or end stage renal disease        4. Urinary tract infection,  |              |
| site not specified        5. Hypertensive heart and chronic kidney     |              |
| disease with heart failure and with stage 5 chronic kidney disease, or |              |
|  end stage renal disease        6. Kidney transplant status        7.  |              |
| Unspecified systolic (congestive) heart failure        8. Syncope and  |              |
| collapse        9. Type 2 diabetes mellitus with diabetic chronic      |              |
| kidney disease        10. Atherosclerotic heart disease of native      |              |
| coronary artery without angina pectoris            Prescription Drug   |              |
| Report (12 Mo.)  PDMP query found no report.     Care Providers        |              |
| Provider PRC Type Phone Fax Service Dates   HEADINGS, SANTIAGO, F.N.P.     |              |
| Nurse Practitioner: Family     Current      [facility]     |              |
| Care [phone]    Oct 1, 2016 - Current      [facility]       |              |
| [facility]    [phone] Current      [facility]       |              |
| [facility]     Current      [facility]   |              |
| Mental Health Provider [phone] Current           |              |
| or_praxis Case or Care Manager     Current      Willard Crook -       |              |
| MIRTA Ortiz Case or Care Manager [phone]     |              |
| Current      Jairo Gutierrez MD Other     Current         Known      |              |
| Aliases  No known aliases.   Criteria met         Care Guidelines      |              |
|     10 in 12      3 in 60      2 in 2     The above information is     |              |
| provided for the sole purpose of patient treatment. Use of this        |              |
| information beyond the terms of Data Sharing Memorandum of             |              |
| Understanding and License Agreement is prohibited. In certain cases    |              |
| not all visits may be represented.   Consult the aforementioned        |              |
| facilities for additional information.   2019 [employer]       |              |
| [email or web address] - [place], [place] -                              |              |
| [email or web address]                                         |              |
+------------------------------------------------------------------------+--------------+
 
 
 
+-------------------------------------------------------------------------------------------
--------------------------------------------------------------------------------------------
--------------------+
| Procedure Note                                                                            
                                                                                            
                    |
+-------------------------------------------------------------------------------------------
--------------------------------------------------------------------------------------------
--------------------+
|   Interface, Lab - 2019  9:48 AM PST  Formatting of this note may be different      
                                                                                            
                    |
| from the original.[id number] [id number] patient has registered at the [facility]      
                                                                                            
                    |
| [facility] Emergency Department For more information visit:                  
                                                                                            
                    |
| [email or web address] Security     
                                                                                            
                    |
| EventsNo recent Security Events currently on fileED Care Guidelines from [employer] -       
 
                                                                                            
                    |
| [place] Updated: 18 10:16 AM  Other Information:Currently being seen by         
                                                                                            
                    |
| [facility] in [place] for mental health concerns.[phone] are guidelines and     
                                                                                            
                    |
| the provider should exercise clinical judgment when providing care.Additional plans are   
                                                                                            
                    |
| also available online from the following facilities: [employer] Recent          
                                                                                            
                    |
| Emergency Department Visit SummaryAdmit Date Facility City State Type Major Type          
                                                                                            
                    |
| Diagnoses [facility] [place] Emergency        
                                                                                            
                    |
| Emergency    Chest Pain    Nausea    Shortness of Breath  2019 [facility]      
                                                                                            
                    |
| [facility] [place]. [place] Emergency  Emergency    CHEST PAIN    Abnormal levels of other serum     
                                                                                            
                    |
| enzymes    Personal history of other diseases of the circulatory system    Chest pain,    
                                                                                            
                    |
| unspecified  2019 [facility] [place] Emergency  Emergency    FISTULA   
                                                                                            
                    |
| BLEEDING    Hemorrhage due to vascular prosthetic devices, implants and grafts, initial   
                                                                                            
                    |
| encounter  Dec 22, 2018 [facility] [place] Emergency  Emergency    CHEST      
                                                                                            
                    |
| PAIN    Type 2 diabetes mellitus with hyperglycemia    Chest pain, unspecified  ,   
                                                                                            
                    |
|  [facility] [place] Emergency  Emergency  Chief Complaint: SOB  ,    
                                                                                            
                    |
| 2018 [facility] [place] Emergency  Emergency    FALL    Unspecified fall,     
                                                                                            
                    |
| initial encounter    Dependence on renal dialysis    End stage renal disease  ,     
                                                                                            
                    |
|  [facility] [place] Emergency  Emergency    FALL    Essential (primary)   
                                                                                            
                    |
| hypertension    Type 2 diabetes mellitus with hyperglycemia    Type 2 diabetes mellitus   
                                                                                            
                    |
| with unspecified complications    Unspecified fall, initial encounter    Headache  Nov    
                                                                                            
                    |
| 2018 [facility] [place] Emergency  Emergency    -1. Dorsalgia,             
 
                                                                                            
                    |
| unspecified    -1. Painful micturition, unspecified    0. Frequency of micturition    1.  
                                                                                            
                    |
|  Urinary tract infection, site not specified    6. Type 2 diabetes mellitus with          
                                                                                            
                    |
| hyperglycemia    7. Type 2 diabetes mellitus with diabetic chronic kidney disease    8.   
                                                                                            
                    |
| End stage renal disease    9. Dependence on renal dialysis    10. Shortness of breath     
                                                                                            
                    |
|  11. Long term (current) use of anticoagulants  E.D. Visit Count (12 mo.)Facility Visits  
                                                                                            
                    |
|  Low Acuity [facility] 19 0 [facility] 2 0 [facility]       
                                                                                            
                    |
| [facility] 2 0 Total 23 0 Note: Visits indicate total known visits. Medicaid Low      
                                                                                            
                    |
| Acuity Dx are the number of primary diagnoses on the Medicaid's Low Acuity dx list.       
                                                                                            
                    |
| Recent Inpatient Visit SummaryAdmit Date Facility City State Type Major Type Diagnoses    
                                                                                            
                    |
| or Chief Complaint Dec 27, 2018 [facility] [place] Recovery  Inpatient        
                                                                                            
                    |
| Peripheral arterial disease, osteomyelitis left foot    Other acute osteomyelitis, left   
                                                                                            
                    |
| ankle and foot    Long term (current) use of antibiotics    End stage renal disease       
                                                                                            
                    |
| Anemia in chronic kidney disease  Dec 5[facility] 2018 [facility]      
                                                                                            
                    |
| [facility]  Inpatient    Peripheral vascular disease, unspecified    End stage renal        
                                                                                            
                    |
| disease    Dependence on renal dialysis    Long term (current) use of insulin    Other    
                                                                                            
                    |
| chronic osteomyelitis, left ankle and foot    Type 2 diabetes mellitus with diabetic      
                                                                                            
                    |
| chronic kidney disease    Essential (primary) hypertension  2018 [facility]  
                                                                                            
                    |
|  [place]. [place] Inpatient  Inpatient    Upper GIB    Other chronic osteomyelitis,         
                                                                                            
                    |
| unspecified ankle and foot    End stage renal disease    Other specified personal risk    
                                                                                            
                    |
| factors, not elsewhere classified    Chronic systolic (congestive) heart failure          
 
                                                                                            
                    |
| Anemia in chronic kidney disease    Other disorders of plasma-protein metabolism, not     
                                                                                            
                    |
| elsewhere classified    Moderate protein-calorie malnutrition    Other disorders of       
                                                                                            
                    |
| phosphorus metabolism  2018 [facility] [place] Medical Surgical        
                                                                                            
                    |
| Inpatient    1. Type 2 diabetes mellitus with other specified complication    2. Urinary  
                                                                                            
                    |
|  tract infection, site not specified    3. Unspecified protein-calorie malnutrition       
                                                                                            
                    |
| 4. Other chronic osteomyelitis, unspecified site    5. Hypertensive heart and chronic     
                                                                                            
                    |
| kidney disease with heart failure and with stage 5 chronic kidney disease, or end stage   
                                                                                            
                    |
| renal disease    6. Chronic diastolic (congestive) heart failure    7. Other              
                                                                                            
                    |
| osteomyelitis, ankle and foot    8. Type 2 diabetes mellitus with foot ulcer    9.        
                                                                                            
                    |
| Atherosclerotic heart disease of native coronary artery without angina pectoris    10.    
                                                                                            
                    |
| Chronic obstructive pulmonary disease, unspecified  2018 Suzanne [facility]RISTIANSON       
                                                                                            
                    |
| [place] [place] [facility]  Inpatient    1. Benign paroxysmal vertigo, unspecified ear    
                                                                                            
                    |
|   2. End stage renal disease    3. Hypertensive chronic kidney disease with stage 5       
                                                                                            
                    |
| chronic kidney disease or end stage renal disease    4. Urinary tract infection, site     
                                                                                            
                    |
| not specified    5. Hypertensive heart and chronic kidney disease with heart failure and  
                                                                                            
                    |
|  with stage 5 chronic kidney disease, or end stage renal disease    6. Kidney transplant  
                                                                                            
                    |
|  status    7. Unspecified systolic (congestive) heart failure    8. Syncope and collapse  
                                                                                            
                    |
|     9. Type 2 diabetes mellitus with diabetic chronic kidney disease    10.               
                                                                                            
                    |
| Atherosclerotic heart disease of native coronary artery without angina pectoris           
                                                                                            
                    |
| Prescription Drug Report (12 Mo.)PDMP query found no report.[facility]    
 
                                                                                            
                    |
| Type Phone Fax Service Dates HEADINGS, PILO HENDERSON.N.P. Nurse Practitioner: Family   Current  
                                                                                            
                    |
|   [facility] [phone]  Oct 1, 2016 - Current  [facility]       
                                                                                            
                    |
| [facility]  [phone] Current  [facility]       
                                                                                            
                    |
| Primary Care   Current  [facility] [phone]         
                                                                                            
                    |
| [phone] Current  [facility] Case or Care Manager   Current  Willard Ortiz,  
                                                                                            
                    |
|  [facility] Case or Care Manager [phone] Current  Jairo Gutierrez MD     
                                                                                            
                    |
| Other   Current  Known AliasesNo known aliases. Criteria met  Care Guidelines  10 in 12   
                                                                                            
                    |
|  3 in 60  2 in 2The above information is provided for the sole purpose of patient         
                                                                                            
                    |
| treatment. Use of this information beyond the terms of Data Sharing Memorandum of         
                                                                                            
                    |
| Understanding and License Agreement is prohibited. In certain cases not all visits may    
                                                                                            
                    |
| be represented. Consult the aforementioned facilities for additional information. 2019    
                                                                                            
                    |
| [employer] - [place], [place] -                              
                                                                                            
                    |
| [email or web address]                                                            
                                                                                            
                    |
|   Other acute osteomyelitis, left ankle and foot                                          
                                                                                            
                    |
|   Long term (current) use of antibiotics                                                  
                                                                                            
                    |
|   End stage renal disease                                                                 
                                                                                            
                    |
|   Anemia in chronic kidney disease                                                        
                                                                                            
                    |
|                                                                                           
                                                                                            
                    |
|[facility] [place] [facility]                     
                                                                                            
                    |
|  Peripheral vascular disease, unspecified                                                 
 
                                                                                            
                    |
|   End stage renal disease                                                                 
                                                                                            
                    |
|   Dependence on renal dialysis                                                            
                                                                                            
                    |
|   Long term (current) use of insulin                                                      
                                                                                            
                    |
|   Other chronic osteomyelitis, left ankle and foot                                        
                                                                                            
                    |
|   Type 2 diabetes mellitus with diabetic chronic kidney disease                           
                                                                                            
                    |
|   Essential (primary) hypertension                                                        
                                                                                            
                    |
|                                                                                           
                                                                                            
                    |
|[facility] [place] [facility]  [facility]                           
                                                                                            
                    |
|  Upper GIB                                                                                
                                                                                            
                    |
|   Other chronic osteomyelitis, unspecified ankle and foot                                 
                                                                                            
                    |
|   End stage renal disease                                                                 
                                                                                            
                    |
|   Other specified personal risk factors, not elsewhere classified                         
                                                                                            
                    |
|   Chronic systolic (congestive) heart failure                                             
                                                                                            
                    |
|   Anemia in chronic kidney disease                                                        
                                                                                            
                    |
|   Other disorders of plasma-protein metabolism, not elsewhere classified                  
                                                                                            
                    |
|   Moderate protein-calorie malnutrition                                                   
                                                                                            
                    |
|   Other disorders of phosphorus metabolism                                                
                                                                                            
                    |
|                                                                                           
                                                                                            
                    |
|2018 [facility]. [place] [facility]  Inpatient                     
                                                                                            
                    |
|  1. Type 2 diabetes mellitus with other specified complication                            
 
                                                                                            
                    |
|   2. Urinary tract infection, site not specified                                          
                                                                                            
                    |
|   3. Unspecified protein-calorie malnutrition                                             
                                                                                            
                    |
|   4. Other chronic osteomyelitis, unspecified site                                        
                                                                                            
                    |
|   5. 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
onic kidney disease, or end stage renal disease                                             
                    |
|   6. Chronic diastolic (congestive) heart failure                                         
                                                                                            
                    |
|   7. Other osteomyelitis, ankle and foot                                                  
                                                                                            
                    |
|   8. Type 2 diabetes mellitus with foot ulcer                                             
                                                                                            
                    |
|   9. Atherosclerotic heart disease of native coronary artery without angina pectoris      
                                                                                            
                    |
|   10. Chronic obstructive pulmonary disease, unspecified                                  
                                                                                            
                    |
|                                                                                           
                                                                                            
                    |
|2018 [facility]. [place] [facility]  Inpatient                      
                                                                                            
                    |
|  1. Benign paroxysmal vertigo, unspecified ear                                            
                                                                                            
                    |
|   2. End stage renal disease                                                              
                                                                                            
                    |
|   3. Hypertensive chronic kidney disease with stage 5 chronic kidney disease or end stage 
renal disease                                                                               
                    |
|   4. Urinary tract infection, site not specified                                          
                                                                                            
                    |
|   5. 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
onic kidney disease, or end stage renal disease                                             
                    |
|   6. Kidney transplant status                                                             
                                                                                            
                    |
|   7. Unspecified systolic (congestive) heart failure                                      
                                                                                            
                    |
|   8. Syncope and collapse                                                                 
                                                                                            
                    |
|   9. Type 2 diabetes mellitus with diabetic chronic kidney disease                        
 
                                                                                            
                    |
|   10. Atherosclerotic heart disease of native coronary artery without angina pectoris     
                                                                                            
                    |
|                                                                                           
                                                                                            
                    |
|                                                                                           
                                                                                            
                    |
|                                                                                           
                                                                                            
                    |
|Prescription Drug Report (12 Mo.)                                                          
                                                                                            
                    |
|PDMP query found no report.                                                                
                                                                                            
                    |
|                                                                                           
                                                                                            
                    |
|Care Providers                                                                             
                                                                                            
                    |
|Provider PRC Type Phone Fax Service Dates                                                  
                                                                                            
                    |
|HEADINGS, ELZA HENDERSON Nurse Practitioner: Family   Current                                
                                                                                            
                    |
|[facility] [phone]  Oct 1, 2016 - Current                          
                                                                                            
                    |
|[facility]  [phone] Current                               
                                                                                            
                    |
|[facility]   Current                                
                                                                                            
                    |
|[facility] [phone] Current                 
                                                                                            
                    |
|[facility] Case or Care Manager   Current                                                   
                                                                                            
                    |
|[facility] Case or Care Manager [phone] Current
                                                                                            
                    |
|Jairo Gutierrez MD Other   Current                                                       
                                                                                            
                    |
|                                                                                           
                                                                                            
                    |
|Known Aliases                                                                              
                                                                                            
                    |
|No known aliases.                                                                          
 
                                                                                            
                    |
|Criteria met                                                                               
                                                                                            
                    |
|                                                                                           
                                                                                            
                    |
|  Care Guidelines                                                                          
                                                                                            
                    |
|  10 in 12                                                                                 
                                                                                            
                    |
|  3 in 60                                                                                  
                                                                                            
                    |
|  2 in 2                                                                                   
                                                                                            
                    |
|                                                                                           
                                                                                            
                    |
|The above information is provided for the sole purpose of patient treatment. Use of this in
formation beyond the terms of Data Sharing Memorandum of Understanding and License Agreement
 is prohibited. In  |
|certain cases not all visits may be represented. Consult the aforementioned facilities for 
additional information.                                                                     
                    |
|2019 [employer] - [place], [place] - [email or web address]                                                                                       
                    |
+-------------------------------------------------------------------------------------------
--------------------------------------------------------------------------------------------
--------------------+
 
 
 
+-------------------+---------+--------------------+--------------+
| Performing        | Address | City/State/Zipcode | Phone Number |
| Organization      |         |                    |              |
+-------------------+---------+--------------------+--------------+
|   ED INFORMATION  |         |                    |              |
| EXCHANGE          |         |                    |              |
+-------------------+---------+--------------------+--------------+
 EKG 12 LEAD UNIT PERFORMED (2019  9:44 AM)
 
+-------------------+--------------------------+-----------+--------------+
| Component         | Value                    | Ref Range | Performed At |
+-------------------+--------------------------+-----------+--------------+
| Ventricular Rate  | 93                       | BPM       | [facility] EKG     |
+-------------------+--------------------------+-----------+--------------+
| Atrial Rate       | 93                       | BPM       | [facility] EKG     |
+-------------------+--------------------------+-----------+--------------+
| P-R Interval      | 136                      | ms        | [facility] EKG     |
+-------------------+--------------------------+-----------+--------------+
| QRS Duration      | 126                      | ms        | [facility] EKG     |
+-------------------+--------------------------+-----------+--------------+
| Q-T Interval      | 416                      | ms        | [facility] EKG     |
+-------------------+--------------------------+-----------+--------------+
 
| QTC Calculation   | 517                      | ms        | [facility] EKG     |
| (Bezet)           |                          |           |              |
+-------------------+--------------------------+-----------+--------------+
| Calculated P Axis | 84                       | degrees   | [facility] EKG     |
+-------------------+--------------------------+-----------+--------------+
| Calculated R Axis | -30                      | degrees   | [facility]     |
+-------------------+--------------------------+-----------+--------------+
| Calculated T Axis | 37                       | degrees   | [facility]     |
+-------------------+--------------------------+-----------+--------------+
| Diagnosis         | Normal sinus rhythmLeft  |           | [facility] EKG     |
|                   | axis                     |           |              |
|                   | deviationNon-specific    |           |              |
|                   | intra-ventricular        |           |              |
|                   | conduction blockCannot   |           |              |
|                   | rule out Septal infarct  |           |              |
|                   | , age                    |           |              |
|                   | undeterminedAbnormal     |           |              |
|                   | ECGWhen compared with    |           |              |
|                   | ECG of 2019       |           |              |
|                   | 16:30,QRS duration has   |           |              |
|                   | decreasedMinimal         |           |              |
|                   | criteria for Septal      |           |              |
|                   | infarct are now PresentT |           |              |
|                   |  wave inversion now      |           |              |
|                   | evident in Lateral       |           |              |
|                   | leadsThis ECG contains   |           |              |
|                   | Unconfirmed              |           |              |
|                   | Interpretation           |           |              |
|                   | Statements.    See ED    |           |              |
|                   | Record for Physician     |           |              |
|                   | Interpretation.          |           |              |
|                   | Confirmed by MUSE READ   |           |              |
|                   | ONLY, -COMPUTER ([id number]),   |           |              |
|                   |  Sherman Grissom   |           |              |
|                   | Samm ([id number]) on 2019  |           |              |
|                   | 3:51:40 AM               |           |              |
+-------------------+--------------------------+-----------+--------------+
 
 
 
+--------------+-------------------+--------------------+--------------+
| Performing   | Address           | City/State/Zipcode | Phone Number |
| Organization |                   |                    |              |
+--------------+-------------------+--------------------+--------------+
|   [facility]   |   [address] | [place] [id number] |              |
+--------------+-------------------+--------------------+--------------+
 in this encounter
 
 Visit Diagnoses
 
 
+---------------------------------------------------------------------+
| Diagnosis                                                           |
+---------------------------------------------------------------------+
|   Hx of CABG - Primary                                              |
+---------------------------------------------------------------------+
|   Postsurgical aortocoronary bypass status                          |
+---------------------------------------------------------------------+
|   Chest pain, unspecified type                                      |
+---------------------------------------------------------------------+
 
|   S/P MVR (mitral valve repair)                                     |
+---------------------------------------------------------------------+
|   Other postprocedural status                                       |
+---------------------------------------------------------------------+
|   Elevated blood pressure reading                                   |
+---------------------------------------------------------------------+
|   Elevated blood pressure reading without diagnosis of hypertension |
+---------------------------------------------------------------------+
|   Chronic systolic congestive heart failure (HCC)                   |
+---------------------------------------------------------------------+
|   Chronic systolic heart failure                                    |
+---------------------------------------------------------------------+
 
 
 
 Admitting Diagnoses
 
 
+---------------------------------------------------------------------+
| Diagnosis                                                           |
+---------------------------------------------------------------------+
|   Elevated blood pressure reading                                   |
+---------------------------------------------------------------------+
|   Elevated blood pressure reading without diagnosis of hypertension |
+---------------------------------------------------------------------+
|   Chronic systolic congestive heart failure (HCC)                   |
+---------------------------------------------------------------------+
|   Chronic systolic heart failure                                    |
+---------------------------------------------------------------------+
|   S/P MVR (mitral valve repair)                                     |
+---------------------------------------------------------------------+
|   Other postprocedural status                                       |
+---------------------------------------------------------------------+
|   Hx of CABG                                                        |
+---------------------------------------------------------------------+
|   Postsurgical aortocoronary bypass status                          |
+---------------------------------------------------------------------+
|   Chest pain in adult                                               |
+---------------------------------------------------------------------+
|   Chest pain, unspecified type                                      |
+---------------------------------------------------------------------+
 
 
 
 Administered Medications
 
 
+------------------+--------+---------+------+------+------+
| Medication Order | MAR    | Action  | Dose | Rate | Site |
|                  | Action | Date    |      |      |      |
+------------------+--------+---------+------+------+------+
 
 
 
+-----------------------------------+---+
|   acetaminophen (TYLENOL)         |   |
| suppository 650 mg  650 mg,       |   |
| Rectal, Every 6 Hours PRN, Mild   |   |
| Pain (1-3), Fever, Starting Wed   |   |
| 19 at 1351                   |   |
 
+-----------------------------------+---+
|                                   |   |
+-----------------------------------+---+
|   acetaminophen (TYLENOL) tablet  |   |
| 650 mg  650 mg, Oral, Every 6     |   |
| Hours PRN, Mild Pain (1-3),       |   |
| Fever, Starting 19 at    |   |
| 1351                              |   |
+-----------------------------------+---+
|                                   |   |
+-----------------------------------+---+
 
 
 
+-----------------------------------+-------+----------+--------+---+---+
|   apixaban (ELIQUIS) tablet 2.5   | Given |  | 2.5 mg |   |   |
| mg  2.5 mg, Oral, 2 Times Daily,  |       | 9 11:48  |        |   |   |
| First dose on 19 at 1430 |       | PST      |        |   |   |
+-----------------------------------+-------+----------+--------+---+---+
 
 
 
+-------+----------+--------+---+---+
| Given |  | 2.5 mg |   |   |
|       | 9 21:53  |        |   |   |
|       | PST      |        |   |   |
+-------+----------+--------+---+---+
| Given |  | 2.5 mg |   |   |
|       | 9 08:15  |        |   |   |
|       | PST      |        |   |   |
+-------+----------+--------+---+---+
 
 
 
+---+---+
|   |   |
+---+---+
 
 
 
+-----------------------------------+-------+----------+-------+---+---+
|   atorvastatin (LIPITOR) tablet   | Given |  | 40 mg |   |   |
| 40 mg  40 mg, Oral, Nightly,      |       | 9 22:44  |       |   |   |
| First dose on 19 at 2200 |       | PST      |       |   |   |
+-----------------------------------+-------+----------+-------+---+---+
 
 
 
+-------+----------+-------+---+---+
| Given |  | 40 mg |   |   |
|       | 9 21:53  |       |   |   |
|       | PST      |       |   |   |
+-------+----------+-------+---+---+
 
 
 
+---+---+
|   |   |
+---+---+
 
 
 
 
+----------------------------------+-------+----------+--------+---+---+
|   calcium acetate (PHOSLO)       | Given |  | 667 mg |   |   |
| capsule 667 mg  667 mg, Oral, 3  |       | 9 16:18  |        |   |   |
| Times Daily With Meals, First    |       | PST      |        |   |   |
| dose on 19 at 1700      |       |          |        |   |   |
+----------------------------------+-------+----------+--------+---+---+
 
 
 
+-------+----------+--------+---+---+
| Given |  | 667 mg |   |   |
|       | 9 08:15  |        |   |   |
|       | PST      |        |   |   |
+-------+----------+--------+---+---+
| Given |  | 667 mg |   |   |
|       | 9 12:25  |        |   |   |
|       | PST      |        |   |   |
+-------+----------+--------+---+---+
 
 
 
+---+---+
|   |   |
+---+---+
 
 
 
+----------------------------------+-------+----------+----------+---+---+
|   calcium carbonate (TUMS)       | Given |  | 1,000 mg |   |   |
| chewable tablet 1,000 mg  1,000  |       | 9 11:47  |          |   |   |
| mg, Oral, Every 48 Hours, First  |       | PST      |          |   |   |
| dose on u 19 at 0830      |       |          |          |   |   |
+----------------------------------+-------+----------+----------+---+---+
 
 
 
+---+---+
|   |   |
+---+---+
 
 
 
+-----------------------------------+-------+----------+---------+---+---+
|   carvedilol (COREG) tablet 12.5  | Given |  | 12.5 mg |   |   |
| mg  12.5 mg, Oral, 2 Times Daily  |       | 9 16:18  |         |   |   |
| With Meals, First dose on Thu     |       | PST      |         |   |   |
| 19 at 1700                   |       |          |         |   |   |
+-----------------------------------+-------+----------+---------+---+---+
 
 
 
+-------+----------+---------+---+---+
| Given |  | 12.5 mg |   |   |
|       | 9 08:14  |         |   |   |
|       | PST      |         |   |   |
+-------+----------+---------+---+---+
 
 
 
 
+---+---+
|   |   |
+---+---+
 
 
 
+-----------------------------------+-------+----------+----------+---+---+
|   carvedilol (COREG) tablet 3.125 | Given |  | 3.125 mg |   |   |
|  mg  3.125 mg, Oral, 2 Times      |       | 9 16:59  |          |   |   |
| Daily With Meals, First dose on   |       | PST      |          |   |   |
| Wed 19 at 1700               |       |          |          |   |   |
+-----------------------------------+-------+----------+----------+---+---+
 
 
 
+---+---+
|   |   |
+---+---+
 
 
 
+-----------------------------------+-------+----------+-----+-------+---+
|   cefTAZidime (FORTAZ) 2 g in     | Given |  | 2 g | 100   |   |
| sodium chloride (IV) 0.9 % 50 mL  |       | 9 13:47  |     | mL/hr |   |
| IVPB  2 g, Intravenous,           |       | PST      |     |       |   |
| Administer over 30 Minutes, After |       |          |     |       |   |
|  Hemodialysis (see admin          |       |          |     |       |   |
| instructions), Starting Thu       |       |          |     |       |   |
| 19 at 0000, Please send MAR  |       |          |     |       |   |
| message to request dose on        |       |          |     |       |   |
| dialysis days                     |       |          |     |       |   |
+-----------------------------------+-------+----------+-----+-------+---+
 
 
 
+---+---+
|   |   |
+---+---+
 
 
 
+-----------------------------------+-------+----------+--------+---+---+
|   cholecalciferol (VITAMIN D-3)   | Given |  | 1,000  |   |   |
| tablet 1,000 Units  1,000 Units,  |       | 9 15:25  | Units  |   |   |
| Oral, Daily, First dose on Wed    |       | PST      |        |   |   |
| 19 at 1430                   |       |          |        |   |   |
+-----------------------------------+-------+----------+--------+---+---+
 
 
 
+-------+----------+--------+---+---+
| Given |  | 1,000  |   |   |
|       | 9 11:48  | Units  |   |   |
|       | PST      |        |   |   |
+-------+----------+--------+---+---+
| Given |  | 1,000  |   |   |
|       | 9 08:16  | Units  |   |   |
|       | PST      |        |   |   |
+-------+----------+--------+---+---+
 
 
 
 
+---+---+
|   |   |
+---+---+
 
 
 
+-----------------------------------+-------+----------+-------+---+---+
|   clopidogrel (PLAVIX) tablet 75  | Given |  | 75 mg |   |   |
| mg  75 mg, Oral, Daily, First     |       | 9 15:24  |       |   |   |
| dose on Wed 19 at 1430       |       | PST      |       |   |   |
+-----------------------------------+-------+----------+-------+---+---+
 
 
 
+-------+----------+-------+---+---+
| Given |  | 75 mg |   |   |
|       | 9 11:47  |       |   |   |
|       | PST      |       |   |   |
+-------+----------+-------+---+---+
| Given |  | 75 mg |   |   |
|       | 9 08:16  |       |   |   |
|       | PST      |       |   |   |
+-------+----------+-------+---+---+
 
 
 
+---+---+
|   |   |
+---+---+
 
 
 
+-----------------------------------+-------+----------+-------+---+---+
|   diphenhydrAMINE (BENADRYL)      | Given |  | 25 mg |   |   |
| capsule 25 mg  25 mg, Oral, Every |       | 9 10:58  |       |   |   |
|  6 Hours PRN, Itching, Starting   |       | PST      |       |   |   |
| Fri 19 at 1020               |       |          |       |   |   |
+-----------------------------------+-------+----------+-------+---+---+
 
 
 
+---+---+
|   |   |
+---+---+
 
 
 
+-----------------------------------+-------+----------+-------+---+---+
|   furosemide (LASIX) tablet 20 mg | Given |  | 20 mg |   |   |
|   20 mg, Oral, Daily, First dose  |       | 9 15:25  |       |   |   |
| on Wed 19 at 1430            |       | PST      |       |   |   |
+-----------------------------------+-------+----------+-------+---+---+
 
 
 
+---+---+
|   |   |
 
+---+---+
 
 
 
+-----------------------------------+-------+----------+----------+---+---+
|   HYDROcodone-acetaminophen       | Given |  | 1 tablet |   |   |
| (NORCO) 5-325 MG per tablet 1     |       | 9 17:33  |          |   |   |
| tablet  1 tablet, Oral, Every 4   |       | PST      |          |   |   |
| Hours PRN, Moderate Pain (4-6),   |       |          |          |   |   |
| Severe Pain (7-10), Starting Wed  |       |          |          |   |   |
| 19 at 1350                   |       |          |          |   |   |
+-----------------------------------+-------+----------+----------+---+---+
 
 
 
+-------+----------+----------+---+---+
| Given |  | 1 tablet |   |   |
|       | 9 21:53  |          |   |   |
|       | PST      |          |   |   |
+-------+----------+----------+---+---+
| Given |  | 1 tablet |   |   |
|       | 9 05:42  |          |   |   |
|       | PST      |          |   |   |
+-------+----------+----------+---+---+
 
 
 
+---+---+
|   |   |
+---+---+
 
 
 
+-----------------------------------+-------+----------+---------+---+---+
|   HYDROcodone-acetaminophen       | Given |  | 2       |   |   |
| (NORCO) 5-325 MG per tablet 2     |       | 9 11:32  | tablets |   |   |
| tablet  2 tablet, Oral, Once, Wed |       | PST      |         |   |   |
|  19 at 1130, For 1 dose      |       |          |         |   |   |
+-----------------------------------+-------+----------+---------+---+---+
 
 
 
+---+---+
|   |   |
+---+---+
 
 
 
+-----------------------------------+-------+----------+----------+---+---+
|   insulin detemir (LEVEMIR)       | Given |  | 10 Units |   |   |
| injection 10 Units  10 Units,     |       | 9 22:43  |          |   |   |
| Subcutaneous, Nightly, First dose |       | PST      |          |   |   |
|  on 19 at 2200           |       |          |          |   |   |
+-----------------------------------+-------+----------+----------+---+---+
 
 
 
+-------+----------+----------+---+---+
| Given |  | 10 Units |   |   |
|       | 9 21:54  |          |   |   |
 
|       | PST      |          |   |   |
+-------+----------+----------+---+---+
 
 
 
+---+---+
|   |   |
+---+---+
 
 
 
+-----------------------------------+-------+----------+---------+---+---+
|   insulin lispro (human)          | Given |  | 1 Units |   |   |
| (HUMALOG) injection 0-3 Units     |       | 9 22:43  |         |   |   |
| 0-3 Units, Subcutaneous, Nightly, |       | PST      |         |   |   |
|  First dose on 19 at     |       |          |         |   |   |
| 2200                              |       |          |         |   |   |
+-----------------------------------+-------+----------+---------+---+---+
 
 
 
+---+---+
|   |   |
+---+---+
 
 
 
+-----------------------------------+-------+----------+---------+---+---+
|   insulin lispro (human)          | Given |  | 1 Units |   |   |
| (HUMALOG) injection 0-6 Units     |       | 9 05:38  |         |   |   |
| 0-6 Units, Subcutaneous, 3 Times  |       | PST      |         |   |   |
| Daily Before Meals, First dose on |       |          |         |   |   |
|  Wed 19 at 1630              |       |          |         |   |   |
+-----------------------------------+-------+----------+---------+---+---+
 
 
 
+-------+----------+---------+---+---+
| Given |  | 2 Units |   |   |
|       | 9 12:15  |         |   |   |
|       | PST      |         |   |   |
+-------+----------+---------+---+---+
 
 
 
+---+---+
|   |   |
+---+---+
 
 
 
+-----------------------------------+-------+----------+-------+---+---+
|   isosorbide mononitrate (IMDUR)  | Given |  | 60 mg |   |   |
| 24 hr tablet 60 mg  60 mg, Oral,  |       | 9 15:24  |       |   |   |
| Daily, First dose on 19  |       | PST      |       |   |   |
| at 1430                           |       |          |       |   |   |
+-----------------------------------+-------+----------+-------+---+---+
 
 
 
 
+-------+----------+-------+---+---+
| Given |  | 60 mg |   |   |
|       | 9 07:34  |       |   |   |
|       | PST      |       |   |   |
+-------+----------+-------+---+---+
| Given |  | 60 mg |   |   |
|       | 9 08:15  |       |   |   |
|       | PST      |       |   |   |
+-------+----------+-------+---+---+
 
 
 
+---+---+
|   |   |
+---+---+
 
 
 
+-----------------------------------+-------+----------+------+---+---+
|   LORazepam (ATIVAN) tablet 1 mg  | Given |  | 1 mg |   |   |
|  1 mg, Oral, Daily PRN, Anxiety,  |       | 9 22:51  |      |   |   |
| Starting 19 at 1350      |       | PST      |      |   |   |
+-----------------------------------+-------+----------+------+---+---+
 
 
 
+-------+----------+------+---+---+
| Given |  | 1 mg |   |   |
|       | 9 07:31  |      |   |   |
|       | PST      |      |   |   |
+-------+----------+------+---+---+
 
 
 
+---+---+
|   |   |
+---+---+
 
 
 
+-----------------------------------+-------+----------+------+---+---+
|   LORazepam (ATIVAN) tablet 1 mg  | Given |  | 1 mg |   |   |
|  1 mg, Oral, Once, Wed 19 at |       | 9 13:23  |      |   |   |
|  1323, For 1 dose                 |       | PST      |      |   |   |
+-----------------------------------+-------+----------+------+---+---+
 
 
 
+---+---+
|   |   |
+---+---+
 
 
 
+-----------------------------------+-------+----------+------+---+---+
|   LORazepam (ATIVAN) tablet 1 mg  | Given |  | 1 mg |   |   |
|  1 mg, Oral, Every 6 Hours PRN,   |       | 9 16:34  |      |   |   |
| Anxiety, Starting Thu 19 at  |       | PST      |      |   |   |
| 1200                              |       |          |      |   |   |
+-----------------------------------+-------+----------+------+---+---+
 
 
 
 
+-------+----------+------+---+---+
| Given |  | 1 mg |   |   |
|       | 9 13:15  |      |   |   |
|       | PST      |      |   |   |
+-------+----------+------+---+---+
 
 
 
+---+---+
|   |   |
+---+---+
 
 
 
+-----------------------------------+-------+----------+-------+---+---+
|   losartan (COZAAR) tablet 25 mg  | Given |  | 25 mg |   |   |
|  25 mg, Oral, Daily, First dose   |       | 9 15:24  |       |   |   |
| on Wed 19 at 1430            |       | PST      |       |   |   |
+-----------------------------------+-------+----------+-------+---+---+
 
 
 
+-------+----------+-------+---+---+
| Given |  | 25 mg |   |   |
|       | 9 11:48  |       |   |   |
|       | PST      |       |   |   |
+-------+----------+-------+---+---+
| Given |  | 25 mg |   |   |
|       | 9 08:15  |       |   |   |
|       | PST      |       |   |   |
+-------+----------+-------+---+---+
 
 
 
+---+---+
|   |   |
+---+---+
 
 
 
+-----------------------------------+-------+----------+-------+---+---+
|   magnesium chloride (MAG64) EC   | Given |  | 64 mg |   |   |
| tablet 64 mg  64 mg (1 tablet),   |       | 9 11:47  |       |   |   |
| Oral, Every 48 Hours, First dose  |       | PST      |       |   |   |
| on Thu 19 at 0830            |       |          |       |   |   |
+-----------------------------------+-------+----------+-------+---+---+
 
 
 
+---+---+
|   |   |
+---+---+
 
 
 
+-----------------------------------+-------+----------+--------+---+---+
|   nitroGLYCERIN (NITRO-BID) 2 %   | Given |  | 1 inch |   |   |
 
| ointment 1 inch  1 inch, Topical, |       | 9 16:59  |        |   |   |
|  Every 6 Hours, First dose on Wed |       | PST      |        |   |   |
|  19 at 1107, For 4 doses     |       |          |        |   |   |
+-----------------------------------+-------+----------+--------+---+---+
 
 
 
+-------+----------+--------+---+---+
| Given |  | 1 inch |   |   |
|       | 9 22:51  |        |   |   |
|       | PST      |        |   |   |
+-------+----------+--------+---+---+
| Given |  | 1 inch |   |   |
|       | 9 05:39  |        |   |   |
|       | PST      |        |   |   |
+-------+----------+--------+---+---+
 
 
 
+---+---+
|   |   |
+---+---+
 
 
 
+-----------------------------------+-------+----------+--------+---+---+
|   nitroGLYCERIN (NITROSTAT) SL    | Given |  | 0.4 mg |   |   |
| tablet 0.4 mg  0.4 mg,            |       | 9 09:59  |        |   |   |
| Sublingual, Every 5 Min PRN,      |       | PST      |        |   |   |
| Chest pain, Starting Wed 19  |       |          |        |   |   |
| at 0954, For 3 doses              |       |          |        |   |   |
+-----------------------------------+-------+----------+--------+---+---+
 
 
 
+-------+----------+--------+---+---+
| Given |  | 0.4 mg |   |   |
|       | 9 11:05  |        |   |   |
|       | PST      |        |   |   |
+-------+----------+--------+---+---+
| Given |  | 0.4 mg |   |   |
|       | 9 11:12  |        |   |   |
|       | PST      |        |   |   |
+-------+----------+--------+---+---+
 
 
 
+---+---+
|   |   |
+---+---+
 
 
 
+-----------------------------------+-------+----------+--------+---+---+
|   nitroGLYCERIN (NITROSTAT) SL    | Given |  | 0.4 mg |   |   |
| tablet 0.4 mg  0.4 mg,            |       | 9 13:27  |        |   |   |
| Sublingual, Every 5 Min PRN,      |       | PST      |        |   |   |
| Chest pain, Starting Wed 19  |       |          |        |   |   |
| at 1327, For 3 doses              |       |          |        |   |   |
+-----------------------------------+-------+----------+--------+---+---+
 
 
 
 
+-------+----------+--------+---+---+
| Given |  | 0.4 mg |   |   |
|       | 9 14:48  |        |   |   |
|       | PST      |        |   |   |
+-------+----------+--------+---+---+
| Given |  | 0.4 mg |   |   |
|       | 9 14:54  |        |   |   |
|       | PST      |        |   |   |
+-------+----------+--------+---+---+
 
 
 
+---+---+
|   |   |
+---+---+
 
 
 
+-----------------------------------+-------+----------+--------+---+---+
|   nitroGLYCERIN (NITROSTAT) SL    | Given |  | 0.4 mg |   |   |
| tablet 0.4 mg  0.4 mg,            |       | 9 13:55  |        |   |   |
| Sublingual, Every 5 Min PRN,      |       | PST      |        |   |   |
| Chest pain, Starting Thu 19  |       |          |        |   |   |
| at 1018                           |       |          |        |   |   |
+-----------------------------------+-------+----------+--------+---+---+
 
 
 
+-------+----------+--------+---+---+
| Given |  | 0.4 mg |   |   |
|       | 9 16:34  |        |   |   |
|       | PST      |        |   |   |
+-------+----------+--------+---+---+
| Given |  | 0.4 mg |   |   |
|       | 9 05:39  |        |   |   |
|       | PST      |        |   |   |
+-------+----------+--------+---+---+
 
 
 
+---+---+
|   |   |
+---+---+
 
 
 
+-----------------------------------+-------+----------+-------+---+---+
|   nortriptyline (PAMELOR) capsule | Given |  | 25 mg |   |   |
|  25 mg  25 mg, Oral, Every        |       | 9 08:14  |       |   |   |
| Morning, First dose on Fri        |       | PST      |       |   |   |
| 19 at 0900                   |       |          |       |   |   |
+-----------------------------------+-------+----------+-------+---+---+
 
 
 
+---+---+
|   |   |
 
+---+---+
 
 
 
+-----------------------------------+-------+----------+-------+---+---+
|   nortriptyline (PAMELOR) capsule | Given |  | 75 mg |   |   |
|  75 mg  75 mg, Oral, Nightly,     |       | 9 21:53  |       |   |   |
| First dose on Thu 19 at 2200 |       | PST      |       |   |   |
+-----------------------------------+-------+----------+-------+---+---+
 
 
 
+-----------------------------------+---+
|                                   |   |
+-----------------------------------+---+
|   ondansetron (ZOFRAN) injection  |   |
| 4 mg  4 mg, Intravenous, Every 6  |   |
| Hours PRN, Nausea, Vomiting,      |   |
| Starting Wed 19 at 1351      |   |
+-----------------------------------+---+
|                                   |   |
+-----------------------------------+---+
 
 
 
+-----------------------------------+-------+----------+------+---+---+
|   ondansetron (ZOFRAN-ODT)        | Given |  | 4 mg |   |   |
| disintegrating tablet 4 mg  4 mg, |       | 9 10:21  |      |   |   |
|  Oral, Every 6 Hours PRN, Nausea, |       | PST      |      |   |   |
|  Vomiting, Starting Wed 19   |       |          |      |   |   |
| at 1351                           |       |          |      |   |   |
+-----------------------------------+-------+----------+------+---+---+
 
 
 
+---+---+
|   |   |
+---+---+
 
 
 
+-----------------------------------+-------+----------+--------+---+---+
|   oxybutynin (DITROPAN) tablet    | Given |  | 2.5 mg |   |   |
| 2.5 mg  2.5 mg, Oral, 2 Times     |       | 9 13:46  |        |   |   |
| Daily, First dose on 19  |       | PST      |        |   |   |
| at 1430                           |       |          |        |   |   |
+-----------------------------------+-------+----------+--------+---+---+
 
 
 
+-------+----------+--------+---+---+
| Given |  | 2.5 mg |   |   |
|       | 9 21:54  |        |   |   |
|       | PST      |        |   |   |
+-------+----------+--------+---+---+
| Given |  | 2.5 mg |   |   |
|       | 9 08:14  |        |   |   |
|       | PST      |        |   |   |
+-------+----------+--------+---+---+
 
 
 
 
+---+---+
|   |   |
+---+---+
 
 
 
+-----------------------------------+-------+----------+-------+---+---+
|   pantoprazole (PROTONIX) EC      | Given |  | 40 mg |   |   |
| tablet 40 mg  40 mg, Oral, Every  |       | 9 05:39  |       |   |   |
| Morning Before Breakfast, First   |       | PST      |       |   |   |
| dose on Thu 19 at 0630       |       |          |       |   |   |
+-----------------------------------+-------+----------+-------+---+---+
 
 
 
+-------+----------+-------+---+---+
| Given |  | 40 mg |   |   |
|       | 9 05:42  |       |   |   |
|       | PST      |       |   |   |
+-------+----------+-------+---+---+
 
 
 
+---+---+
|   |   |
+---+---+
 
 
 
+-----------------------------------+-------+----------+---------+---+---+
|   rOPINIRole (REQUIP) tablet 0.75 | Given |  | 0.75 mg |   |   |
|  mg  0.75 mg, Oral, Nightly,      |       | 9 22:43  |         |   |   |
| First dose on Wed 19 at 2200 |       | PST      |         |   |   |
+-----------------------------------+-------+----------+---------+---+---+
 
 
 
+-------+----------+---------+---+---+
| Given |  | 0.75 mg |   |   |
|       | 9 21:53  |         |   |   |
|       | PST      |         |   |   |
+-------+----------+---------+---+---+
 
 
 
+---+---+
|   |   |
+---+---+
 
 
 
+-----------------------------------+-------+----------+--------+-------+---+
|   vancomycin (VANCOCIN) 500 mg in | Given |  | 500 mg | 100   |   |
|  sodium chloride (IV) 0.9 % 100   |       | 9 11:02  |        | mL/hr |   |
| mL IVPB  500 mg, Intravenous,     |       | PST      |        |       |   |
| Administer over 60 Minutes, Once, |       |          |        |       |   |
|  Thu 19 at 1100, For 1 dose, |       |          |        |       |   |
|  After dialysis on HD days        |       |          |        |       |   |
 
+-----------------------------------+-------+----------+--------+-------+---+
 
 
 
+---+---+
|   |   |
+---+---+
 in this encounter

## 2019-04-09 NOTE — XMS
Encounter Summary
  Created on: 2019
 
 Cherie Villalobos
 External Reference #: [id number]
 : 48
 Sex: Female
 
 Demographics
 
 
+-----------------------+--------------------------+
| Address               | [address]               |
|                       | [place]  [address] |
+-----------------------+--------------------------+
| Home Phone            | [phone]          |
+-----------------------+--------------------------+
| Preferred Language    | Unknown                  |
+-----------------------+--------------------------+
| Marital Status        |                   |
+-----------------------+--------------------------+
| Shinto Affiliation | 1013                     |
+-----------------------+--------------------------+
| Race                  | Unknown                  |
+-----------------------+--------------------------+
| Ethnic Group          | Unknown                  |
+-----------------------+--------------------------+
 
 
 Author
 
 
+--------------+-----------------------+
| Author       | [facility] |
+--------------+-----------------------+
| Organization | [facility] |
+--------------+-----------------------+
| Address      | Unknown               |
+--------------+-----------------------+
| Phone        | Unavailable           |
+--------------+-----------------------+
 
 
 
 Support
 
 
+---------------+--------------+---------------------+-----------------+
| Name          | Relationship | Address             | Phone           |
+---------------+--------------+---------------------+-----------------+
| Anoop Villalobos | ECON         | [place], | [phone] |
|               |              |  [place]  [address]     |                 |
+---------------+--------------+---------------------+-----------------+
| Jennifer Dickson | ECON         | [place]       | [phone] |
|               |              | [phone]               |                 |
+---------------+--------------+---------------------+-----------------+
 
 
 
 
 Care Team Providers
 
 
+-----------------------+------+-----------------+
| Care Team Member Name | Role | Phone           |
+-----------------------+------+-----------------+
| Ivy Couch DO      | PCP  | [phone] |
+-----------------------+------+-----------------+
 
 
 
 Reason for Visit
 
 
+-----------+-------------------------------------------------------------------+
| Reason    | Comments                                                          |
+-----------+-------------------------------------------------------------------+
| Follow-up | Patient presents today for her 1st post op amputation of the left |
|           |  toes. Patient states she is doing well, she states she is ready  |
|           | to go home from the care facility.                                |
+-----------+-------------------------------------------------------------------+
 
 
 
 Encounter Details
 
 
+--------+---------+----------------------+----------------------+----------------------+
| Date   | Type    | Department           | Care Team            | Description          |
+--------+---------+----------------------+----------------------+----------------------+
| 01/10/ | Office  |   [facility] |   Srinivasan Jordan,  | S/P amputation of    |
| 2019   | Visit   |  and Ankle  [address]      | DPM  [address], | foot, left (HCC)     |
|        |         | [address]   |  [address]  [place],  | (Primary Dx); Type 2 |
|        |         | [place], [place]         | [place] [address]             |  diabetes mellitus   |
|        |         | [address]           | [phone]         | with chronic kidney  |
|        |         | [phone]         | [phone] (Fax)   | disease on chronic   |
|        |         |                      |                      | dialysis, with       |
|        |         |                      |                      | long-term current    |
|        |         |                      |                      | use of insulin (HCC) |
+--------+---------+----------------------+----------------------+----------------------+
 
 
 
 Social History
 
 
+---------------+------------+-----------+--------+------------------+
| Tobacco Use   | Types      | Packs/Day | Years  | Date             |
|               |            |           | Used   |                  |
+---------------+------------+-----------+--------+------------------+
| Former Smoker | Cigarettes | 1         | 30     | Quit: 2004 |
+---------------+------------+-----------+--------+------------------+
 
 
 
+---------------------+---+---+---+
| Smokeless Tobacco:  |   |   |   |
| Never Used          |   |   |   |
+---------------------+---+---+---+
 
 
 
 
+------------------------------+
| Comments: quite smoking  |
+------------------------------+
 
 
 
+-------------+-----------+---------+----------+
| Alcohol Use | Drinks/We | oz/Week | Comments |
|             | ek        |         |          |
+-------------+-----------+---------+----------+
| No          |           |         |          |
+-------------+-----------+---------+----------+
 
 
 
+------------------+---------------+
| Sex Assigned at  | Date Recorded |
| Birth            |               |
+------------------+---------------+
| Not on file      |               |
+------------------+---------------+
 as of this encounter
 
 Last Filed Vital Signs
 
 
+-------------------+---------+-------------------------+
| Vital Sign        | Reading | Time Taken              |
+-------------------+---------+-------------------------+
| Blood Pressure    | 115/54  | 01/10/2019  1:30 PM PST |
+-------------------+---------+-------------------------+
| Pulse             | 59      | 01/10/2019  1:30 PM PST |
+-------------------+---------+-------------------------+
| Temperature       | -       | -                       |
+-------------------+---------+-------------------------+
| Respiratory Rate  | 16      | 01/10/2019  1:30 PM PST |
+-------------------+---------+-------------------------+
| Oxygen Saturation | 93%     | 01/10/2019  1:30 PM PST |
+-------------------+---------+-------------------------+
| Inhaled Oxygen    | -       | -                       |
| Concentration     |         |                         |
+-------------------+---------+-------------------------+
| Weight            | -       | -                       |
+-------------------+---------+-------------------------+
| Height            | -       | -                       |
+-------------------+---------+-------------------------+
| Body Mass Index   | -       | -                       |
+-------------------+---------+-------------------------+
 in this encounter
 
 Progress Notes
 Srinivasan Jordan, DPM - 01/10/2019  1:30 PM PSTFormatting of this note may be different fro
m the original.
  
 Subjective: 
  Patient ID: Cherie Villalobos is a 68 y.o. female.
 Chief Complaint 
 
 Patient presents with 
   Follow-up 
   Patient presents today for her 1st post op amputation of the left toes. Patient states sh
e is doing well, she states she is ready to go home from the care facility.  
  
 
 HPI
 Patient returns today status post TMA left on 18.  Patient's pain is mild well contro
lled.  She think she is ready to return home, but does not have a plan for maintaining nonwe
ightbearing status at home. 
 
 The following portions of the patient's history were reviewed and updated as appropriate an
d is available elsewhere in the record: allergies, current medications, past family history,
 past medical history, past social history, past surgical history and problem list.
 
 ROS
 Denies any nausea, vomiting, fever, chills, shortness of breath, chest pain, or calf pain 
 
     
 Objective: 
 /54 (BP Location: Right upper arm, Patient Position: Sitting)  | Pulse 59  | Resp 16 
 | SpO2 93% 
 General observation:
 Constitutional: The patient is alert and oriented to person, place and time
 Psychiatric: The patient has normal affect and mood; her speech is normal; her behavior is 
normal.
 Respiratory: Effort normal and breath sounds normal. No accessory muscle usage. No respirat
ory distress.   
 
 Lower Extremity Examination:
 Neurovascular status is grossly intact. CFT is immediate to distal foot/toes. No dysvascula
r changes. 
 The incision(s) is(are) well coapted, without evidence of dehiscence - it(they) is(are) radha
an, dry and intact.  No evidence of dehiscence.
 There is no evidence of erythema, cellulitis, lymphangitis, drainage, malodor, or signs of 
infection. Normal skin temperature is appreciated.
 No skin mottling. No hyperesthesias or paresthesias.
 No calf or thigh pain. Negative Homans sign. 
 
 Radiographs: Normal post op appearance.  See epic chart for complete read
 
 X-ray Foot Left
 
 Result Date: 2018
 Amputation of the left forefoot at the level of the proximal metatarsals. Surgical cutaneou
s staples are present. No radiographic evidence for osteomyelitis. Normal alignment of the h
indfoot. Mild degeneration of the midfoot. Electronically signed by Oleksandr Lemus MD on 2018 10:12 AM
 
 Ct Head Without Contrast
 
 Result Date: 2018
 1.  No acute intracranial pathology. Electronically signed by Steven Samano MD on  5:28 PM
 
 X-ray Chest 1 View
 
 Result Date: 2019
 1.  Small loculated pleural fluid collection seen overlying the lateral left heart border i
n the lower left chest.  There may also be a small pleural effusion at the right lung base w
 
hich is layering posteriorly. 2.  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 Electronically s
igned by Eugenio Hoffman DO on 2019 4:59 PM
 
 Angioplasty 18:
 1. Aorta with narrow aortic bifurcation with moderate stenosis of proximal left common errol
c artery. 
 2. Left SFA with moderate stenosis proximally. 
 3. Single vessel runoff to left foot via left anterior tibial artery. 
 4. Left posterior tibial artery and peroneal artery were occluded with reconstitution of di
stal posterior tibial artery at the left ankle via collaterals. 
 5. If forefoot amputation does not heal, may need popliteal to posterior tibial artery bypa
ss versus antegrade access of left common femoral artery with posterior tibial artery recana
lization. 
  
 Electronically signed by Kirt Mclaughlin MD on 2018 10:51 AM 
 
    
 Assessment and Plan: 
 S/p: TMA left on 18
 Post op day/week: 2 week
 
 Radiographs taken, read and reviewed of involved foot/ankle and findings were discussed wit
h the patient
 Dressing change/applied today with dry sterile, mildly compressive dressing
 Weight bearing status: Strict nonweightbearing
 Patient needs to remain at care facility to maintain nonweightbearing status
 Weight with removable cast boot, keep on at all times
 Follow up in 2 weeks for staple removal and recheck
 Continue antibiotic treatment at the direction of the infectious disease service (Dr. Elliott)
 
 Srinivasan Jordan DPM
  in this encounter
 
 Plan of Treatment
 
 
+--------+---------+-----------+----------------------+-------------+
| Date   | Type    | Specialty | Care Team            | Description |
+--------+---------+-----------+----------------------+-------------+
| 04/10/ | Office  | Podiatry  |   Srinivasan Jordan,  |             |
|    | Visit   |           | DPM  [address], |             |
|        |         |           |  [address]  [place],  |             |
|        |         |           | [place] [address]             |             |
|        |         |           | [phone]         |             |
|        |         |           | [phone] (Fax)   |             |
+--------+---------+-----------+----------------------+-------------+
 as of this encounter
 
 Visit Diagnoses
 
 
+-----------------------------------------------------------------------------------+
| Diagnosis                                                                         |
+-----------------------------------------------------------------------------------+
|   S/P amputation of foot, left (HCC) - Primary                                    |
+-----------------------------------------------------------------------------------+
|   Type 2 diabetes mellitus with chronic kidney disease on chronic dialysis, with  |
| long-term current use of insulin (HCC)                                            |
+-----------------------------------------------------------------------------------+

## 2019-04-09 NOTE — XMS
Encounter Summary
  Created on: 2019
 
 Cherie Villalboos
 External Reference #: [id number]
 : 48
 Sex: Female
 
 Demographics
 
 
+-----------------------+--------------------------+
| Address               | [address]               |
|                       | [place]  [address] |
+-----------------------+--------------------------+
| Home Phone            | [phone]          |
+-----------------------+--------------------------+
| Preferred Language    | Unknown                  |
+-----------------------+--------------------------+
| Marital Status        |                   |
+-----------------------+--------------------------+
| [employer] Affiliation | 1013                     |
+-----------------------+--------------------------+
| Race                  | Unknown                  |
+-----------------------+--------------------------+
| Ethnic Group          | Unknown                  |
+-----------------------+--------------------------+
 
 
 Author
 
 
+--------------+-----------------------+
| Author       | [facility] |
+--------------+-----------------------+
| Organization | [facility] |
+--------------+-----------------------+
| Address      | Unknown               |
+--------------+-----------------------+
| Phone        | Unavailable           |
+--------------+-----------------------+
 
 
 
 Support
 
 
+---------------+--------------+---------------------+-----------------+
| Name          | Relationship | Address             | Phone           |
+---------------+--------------+---------------------+-----------------+
| Anoop Villalobos | ECON         | [place], | [phone] |
|               |              |  [place]  [address]     |                 |
+---------------+--------------+---------------------+-----------------+
| Jennifer Dickson | ECON         | [place]       | [phone] |
|               |              | [address]               |                 |
+---------------+--------------+---------------------+-----------------+
 
 
 
 
 Care Team Providers
 
 
+-----------------------+------+-----------------+
| Care Team Member Name | Role | Phone           |
+-----------------------+------+-----------------+
| Ivy Couch DO      | PCP  | [phone] |
+-----------------------+------+-----------------+
 
 
 
 Encounter Details
 
 
+--------+------------+----------------------+-----------+-------------+
| Date   | Type       | Department           | Care Team | Description |
+--------+------------+----------------------+-----------+-------------+
| / | Procedure  |   [facility]    |           |             |
| 2019   | Pass       | [facility] [address]   |           |             |
|        |            | [address] |           |             |
|        |            |   [address]     |           |             |
|        |            | [place] [address]   |           |             |
|        |            | [phone]         |           |             |
+--------+------------+----------------------+-----------+-------------+
 
 
 
 Social History
 
 
+---------------+------------+-----------+--------+------------------+
| Tobacco Use   | Types      | Packs/Day | Years  | Date             |
|               |            |           | Used   |                  |
+---------------+------------+-----------+--------+------------------+
| Former Smoker | Cigarettes | 1         | 30     | Quit: 2004 |
+---------------+------------+-----------+--------+------------------+
 
 
 
+---------------------+---+---+---+
| Smokeless Tobacco:  |   |   |   |
| Never Used          |   |   |   |
+---------------------+---+---+---+
 
 
 
+------------------------------+
| Comments: quite smoking  |
+------------------------------+
 
 
 
+-------------+-----------+---------+----------+
| Alcohol Use | Drinks/We | oz/Week | Comments |
|             | ek        |         |          |
+-------------+-----------+---------+----------+
| No          |           |         |          |
+-------------+-----------+---------+----------+
 
 
 
 
+------------------+---------------+
| Sex Assigned at  | Date Recorded |
| Birth            |               |
+------------------+---------------+
| Not on file      |               |
+------------------+---------------+
 as of this encounter
 
 Plan of Treatment
 
 
+--------+---------+-----------+----------------------+-------------+
| Date   | Type    | Specialty | Care Team            | Description |
+--------+---------+-----------+----------------------+-------------+
| 04/10/ | Office  | Podiatry  |   Srinivasan Jordan,  |             |
| 2019   | Visit   |           | DPM  [address], |             |
|        |         |           |  [place],  |             |
|        |         |           | [place] [address]             |             |
|        |         |           | [phone]         |             |
|        |         |           | [phone] (Fax)   |             |
+--------+---------+-----------+----------------------+-------------+
 as of this encounter
 
 Visit Diagnoses
 Not on filein this encounter

## 2019-04-09 NOTE — XMS
Encounter Summary
  Created on: 2019
 
 Cherie Villalobos
 External Reference #: [id number]
 : 48
 Sex: Female
 
 Demographics
 
 
+-----------------------+--------------------------+
| Address               | [address]               |
|                       | [place]  [address] |
+-----------------------+--------------------------+
| Home Phone            | [phone]          |
+-----------------------+--------------------------+
| Preferred Language    | Unknown                  |
+-----------------------+--------------------------+
| Marital Status        |                   |
+-----------------------+--------------------------+
| Episcopalian Affiliation | 1013                     |
+-----------------------+--------------------------+
| Race                  | Unknown                  |
+-----------------------+--------------------------+
| Ethnic Group          | Unknown                  |
+-----------------------+--------------------------+
 
 
 Author
 
 
+--------------+-----------------------+
| Author       | [employer] |
+--------------+-----------------------+
| Organization | [facility] |
+--------------+-----------------------+
| Address      | Unknown               |
+--------------+-----------------------+
| Phone        | Unavailable           |
+--------------+-----------------------+
 
 
 
 Support
 
 
+---------------+--------------+---------------------+-----------------+
| Name          | Relationship | Address             | Phone           |
+---------------+--------------+---------------------+-----------------+
| Anoop Villalobos | ECON         | [place], | [phone] |
|               |              |  [place]  [address]     |                 |
+---------------+--------------+---------------------+-----------------+
| Jennifer Dickson | ECON         | [place]       | [phone] |
|               |              | [address]               |                 |
+---------------+--------------+---------------------+-----------------+
 
 
 
 
 Care Team Providers
 
 
+-----------------------+------+-----------------+
| Care Team Member Name | Role | Phone           |
+-----------------------+------+-----------------+
| Ivy Couch DO      | PCP  | [phone] |
+-----------------------+------+-----------------+
 
 
 
 Reason for Visit
 
 
+--------+-----------------------------------------------------+
| Reason | Comments                                            |
+--------+-----------------------------------------------------+
| [facility]  | [facility] Note |
+--------+-----------------------------------------------------+
 
 
 
 Encounter Details
 
 
+--------+-------------+----------------------+----------------------+----------------------
+
| Date   | Type        | Department           | Care Team            | Description          
|
+--------+-------------+----------------------+----------------------+----------------------
+
| 02/15/ | Documentati |   [facility]      |   João Asher MD  | Other (January       
|
| 2019   | on Only     | [facility]  [address]        |  [address]   | 2019-[facility] Provider 
|
|        |             | [address]   | [address]  [place], [place]    |  Dialysis Rounding   
|
|        |             | [place] [address]   | [address]  [phone]  | Note)                
|
|        |             | [phone]         |  [phone] (Fax)  |                      
|
+--------+-------------+----------------------+----------------------+----------------------
+
 
 
 
 Social History
 
 
+---------------+------------+-----------+--------+------------------+
| Tobacco Use   | Types      | Packs/Day | Years  | Date             |
|               |            |           | Used   |                  |
+---------------+------------+-----------+--------+------------------+
| Former Smoker | Cigarettes | 1         | 30     | Quit: 2004 |
+---------------+------------+-----------+--------+------------------+
 
 
 
+---------------------+---+---+---+
 
| Smokeless Tobacco:  |   |   |   |
| Never Used          |   |   |   |
+---------------------+---+---+---+
 
 
 
+------------------------------+
| Comments: quite smoking  |
+------------------------------+
 
 
 
+-------------+-----------+---------+----------+
| Alcohol Use | Drinks/We | oz/Week | Comments |
|             | ek        |         |          |
+-------------+-----------+---------+----------+
| No          |           |         |          |
+-------------+-----------+---------+----------+
 
 
 
+------------------+---------------+
| Sex Assigned at  | Date Recorded |
| Birth            |               |
+------------------+---------------+
| Not on file      |               |
+------------------+---------------+
 as of this encounter
 
 Plan of Treatment
 
 
+--------+---------+-----------+----------------------+-------------+
| Date   | Type    | Specialty | Care Team            | Description |
+--------+---------+-----------+----------------------+-------------+
| 04/10/ | Office  | Podiatry  |   Srinivasan Jordan,  |             |
|    | Visit   |           | DPM  [address], |             |
|        |         |           |  [address]  [place],  |             |
|        |         |           | [place] [address]             |             |
|        |         |           | [phone]         |             |
|        |         |           | [phone] (Fax)   |             |
+--------+---------+-----------+----------------------+-------------+
 as of this encounter
 
 Visit Diagnoses
 Not on filein this encounter

## 2019-04-09 NOTE — XMS
Encounter Summary
  Created on: 2019
 
 Cherie Villalobos
 External Reference #: [id number]
 : 48
 Sex: Female
 
 Demographics
 
 
+-----------------------+--------------------------+
| Address               | [address]               |
|                       | [place]  [address] |
+-----------------------+--------------------------+
| Home Phone            | [phone]          |
+-----------------------+--------------------------+
| Preferred Language    | Unknown                  |
+-----------------------+--------------------------+
| Marital Status        |                   |
+-----------------------+--------------------------+
| Christianity Affiliation | 1013                     |
+-----------------------+--------------------------+
| Race                  | Unknown                  |
+-----------------------+--------------------------+
| Ethnic Group          | Unknown                  |
+-----------------------+--------------------------+
 
 
 Author
 
 
+--------------+-----------------------+
| Author       | [employer] |
+--------------+-----------------------+
| Organization | [facility] |
+--------------+-----------------------+
| Address      | Unknown               |
+--------------+-----------------------+
| Phone        | Unavailable           |
+--------------+-----------------------+
 
 
 
 Support
 
 
+---------------+--------------+---------------------+-----------------+
| Name          | Relationship | Address             | Phone           |
+---------------+--------------+---------------------+-----------------+
| Anoop Villalobos | ECON         | [place], | [phone] |
|               |              |  [place]  [address]     |                 |
+---------------+--------------+---------------------+-----------------+
| Jennifer Dickson | ECON         | [place]       | [phone] |
|               |              | [address]               |                 |
+---------------+--------------+---------------------+-----------------+
 
 
 
 
 Care Team Providers
 
 
+-----------------------+------+-----------------+
| Care Team Member Name | Role | Phone           |
+-----------------------+------+-----------------+
| Ivy Couch DO      | PCP  | [phone] |
+-----------------------+------+-----------------+
 
 
 
 Encounter Details
 
 
+--------+-------------+----------------------+---------------------+-------------+
| Date   | Type        | Department           | Care Team           | Description |
+--------+-------------+----------------------+---------------------+-------------+
| / | Orders Only |   [facility]      |   Dionne Danielson MA |             |
| 2019   |             | Vascular Surgery     |                     |             |
|        |             | [address] |                     |             |
|        |             |  E  [place]     |                     |             |
|        |             | [address]           |                     |             |
|        |             | [phone]         |                     |             |
+--------+-------------+----------------------+---------------------+-------------+
 
 
 
 Social History
 
 
+---------------+------------+-----------+--------+------------------+
| Tobacco Use   | Types      | Packs/Day | Years  | Date             |
|               |            |           | Used   |                  |
+---------------+------------+-----------+--------+------------------+
| Former Smoker | Cigarettes | 1         | 30     | Quit: 2004 |
+---------------+------------+-----------+--------+------------------+
 
 
 
+---------------------+---+---+---+
| Smokeless Tobacco:  |   |   |   |
| Never Used          |   |   |   |
+---------------------+---+---+---+
 
 
 
+------------------------------+
| Comments: quite smoking 2004 |
+------------------------------+
 
 
 
+-------------+-----------+---------+----------+
| Alcohol Use | Drinks/We | oz/Week | Comments |
|             | ek        |         |          |
+-------------+-----------+---------+----------+
| No          |           |         |          |
+-------------+-----------+---------+----------+
 
 
 
 
+------------------+---------------+
| Sex Assigned at  | Date Recorded |
| Birth            |               |
+------------------+---------------+
| Not on file      |               |
+------------------+---------------+
 as of this encounter
 
 Plan of Treatment
 
 
+--------+---------+-----------+----------------------+-------------+
| Date   | Type    | Specialty | Care Team            | Description |
+--------+---------+-----------+----------------------+-------------+
| 04/10/ | Office  | Podiatry  |   Srinivasan Jordan,  |             |
| 2019   | Visit   |           | DPM  [address], |             |
|        |         |           |  [address]  [place],  |             |
|        |         |           | [place] [address]             |             |
|        |         |           | [phone]         |             |
|        |         |           | [phone] (Fax)   |             |
+--------+---------+-----------+----------------------+-------------+
 as of this encounter
 
 Visit Diagnoses
 Not on filein this encounter

## 2019-04-09 NOTE — XMS
Encounter Summary
  Created on: 2019
 
 Cherie Villalobos
 External Reference #: [id number]
 : 48
 Sex: Female
 
 Demographics
 
 
+-----------------------+--------------------------+
| Address               | [address]               |
|                       | [place]  [address] |
+-----------------------+--------------------------+
| Home Phone            | [phone]          |
+-----------------------+--------------------------+
| Preferred Language    | Unknown                  |
+-----------------------+--------------------------+
| Marital Status        |                   |
+-----------------------+--------------------------+
| Hindu Affiliation | 1013                     |
+-----------------------+--------------------------+
| Race                  | Unknown                  |
+-----------------------+--------------------------+
| Ethnic Group          | Unknown                  |
+-----------------------+--------------------------+
 
 
 Author
 
 
+--------------+-----------------------+
| Author       | [employer] |
+--------------+-----------------------+
| Organization | [facility] |
+--------------+-----------------------+
| Address      | Unknown               |
+--------------+-----------------------+
| Phone        | Unavailable           |
+--------------+-----------------------+
 
 
 
 Support
 
 
+---------------+--------------+---------------------+-----------------+
| Name          | Relationship | Address             | Phone           |
+---------------+--------------+---------------------+-----------------+
| Anoop Villalobos | ECON         | [place], | [phone] |
|               |              |  [place]  [address]     |                 |
+---------------+--------------+---------------------+-----------------+
| Jennifer Dickson | ECON         | [place]       | [phone] |
|               |              | [address]               |                 |
+---------------+--------------+---------------------+-----------------+
 
 
 
 
 Care Team Providers
 
 
+-----------------------+------+-----------------+
| Care Team Member Name | Role | Phone           |
+-----------------------+------+-----------------+
| Ivy Couch DO      | PCP  | [phone] |
+-----------------------+------+-----------------+
 
 
 
 Reason for Visit
 
 
+--------+------------------------------------------------------+
| Reason | Comments                                             |
+--------+------------------------------------------------------+
| Other  | [facility] D[facility] Rounding Note |
+--------+------------------------------------------------------+
 
 
 
 Encounter Details
 
 
+--------+-------------+----------------------+----------------------+----------------------
+
| Date   | Type        | Department           | Care Team            | Description          
|
+--------+-------------+----------------------+----------------------+----------------------
+
| / | Documentati |   [facility]      |   João Asher MD  | Other (February      
|
|    | on Only     | [place]  [address]        |  [address]   | 2019-[facility] Provider 
|
|        |             | [address]   | [address]  [place], [place]    |  Dialysis Rounding   
|
|        |             | [place] [address]   | [address] [phone]  | Note)                
|
|        |             | [phone]         |  [phone] (Fax)  |                      
|
+--------+-------------+----------------------+----------------------+----------------------
+
 
 
 
 Social History
 
 
+---------------+------------+-----------+--------+------------------+
| Tobacco Use   | Types      | Packs/Day | Years  | Date             |
|               |            |           | Used   |                  |
+---------------+------------+-----------+--------+------------------+
| Former Smoker | Cigarettes | 1         | 30     | Quit: 2004 |
+---------------+------------+-----------+--------+------------------+
 
 
 
+---------------------+---+---+---+
 
| Smokeless Tobacco:  |   |   |   |
| Never Used          |   |   |   |
+---------------------+---+---+---+
 
 
 
+------------------------------+
| Comments: quite smoking  |
+------------------------------+
 
 
 
+-------------+-----------+---------+----------+
| Alcohol Use | Drinks/We | oz/Week | Comments |
|             | ek        |         |          |
+-------------+-----------+---------+----------+
| No          |           |         |          |
+-------------+-----------+---------+----------+
 
 
 
+------------------+---------------+
| Sex Assigned at  | Date Recorded |
| Birth            |               |
+------------------+---------------+
| Not on file      |               |
+------------------+---------------+
 as of this encounter
 
 Plan of Treatment
 
 
+--------+---------+-----------+----------------------+-------------+
| Date   | Type    | Specialty | Care Team            | Description |
+--------+---------+-----------+----------------------+-------------+
| 04/10/ | Office  | Podiatry  |   Srinivasan Jordan,  |             |
|    | Visit   |           | DPM  [address], |             |
|        |         |           |  [address]  [place],  |             |
|        |         |           | [place] [address]             |             |
|        |         |           | [phone]         |             |
|        |         |           | [phone] (Fax)   |             |
+--------+---------+-----------+----------------------+-------------+
 as of this encounter
 
 Visit Diagnoses
 Not on filein this encounter

## 2019-04-09 NOTE — XMS
Encounter Summary
  Created on: 2019
 
 Cherie Villalobos
 External Reference #: [id number]
 : 48
 Sex: Female
 
 Demographics
 
 
+-----------------------+--------------------------+
| Address               | [address]               |
|                       | [place]  [address] |
+-----------------------+--------------------------+
| Home Phone            | [phone]          |
+-----------------------+--------------------------+
| Preferred Language    | Unknown                  |
+-----------------------+--------------------------+
| Marital Status        |                   |
+-----------------------+--------------------------+
| Orthodoxy Affiliation | 1013                     |
+-----------------------+--------------------------+
| Race                  | Unknown                  |
+-----------------------+--------------------------+
| Ethnic Group          | Unknown                  |
+-----------------------+--------------------------+
 
 
 Author
 
 
+--------------+-----------------------+
| Author       | [employer] |
+--------------+-----------------------+
| Organization | [facility] |
+--------------+-----------------------+
| Address      | Unknown               |
+--------------+-----------------------+
| Phone        | Unavailable           |
+--------------+-----------------------+
 
 
 
 Support
 
 
+---------------+--------------+---------------------+-----------------+
| Name          | Relationship | Address             | Phone           |
+---------------+--------------+---------------------+-----------------+
| Anoop Villalobos | ECON         | [place], | [phone] |
|               |              |  [place]  [address]     |                 |
+---------------+--------------+---------------------+-----------------+
| Jennifer Dickson | ECON         | [place]       | [phone] |
|               |              | [address]               |                 |
+---------------+--------------+---------------------+-----------------+
 
 
 
 
 Care Team Providers
 
 
+-----------------------+------+-----------------+
| Care Team Member Name | Role | Phone           |
+-----------------------+------+-----------------+
| Ivy Couch DO      | PCP  | [phone] |
+-----------------------+------+-----------------+
 
 
 
 Encounter Details
 
 
+--------+-----------+----------------------+----------------------+-------------+
| Date   | Type      | Department           | Care Team            | Description |
+--------+-----------+----------------------+----------------------+-------------+
| / | Telephone |   [facility]      |   Kristen Tam, |             |
| 2019   |           | Vascular Surgery     |  RN                  |             |
|        |           | [address] |                      |             |
|        |           |  E  [place]     |                      |             |
|        |           | [address]           |                      |             |
|        |           | [phone]         |                      |             |
+--------+-----------+----------------------+----------------------+-------------+
 
 
 
 Social History
 
 
+---------------+------------+-----------+--------+------------------+
| Tobacco Use   | Types      | Packs/Day | Years  | Date             |
|               |            |           | Used   |                  |
+---------------+------------+-----------+--------+------------------+
| Former Smoker | Cigarettes | 1         | 30     | Quit: 2004 |
+---------------+------------+-----------+--------+------------------+
 
 
 
+---------------------+---+---+---+
| Smokeless Tobacco:  |   |   |   |
| Never Used          |   |   |   |
+---------------------+---+---+---+
 
 
 
+------------------------------+
| Comments: quite smoking 2004 |
+------------------------------+
 
 
 
+-------------+-----------+---------+----------+
| Alcohol Use | Drinks/We | oz/Week | Comments |
|             | ek        |         |          |
+-------------+-----------+---------+----------+
| No          |           |         |          |
+-------------+-----------+---------+----------+
 
 
 
 
+------------------+---------------+
| Sex Assigned at  | Date Recorded |
| Birth            |               |
+------------------+---------------+
| Not on file      |               |
+------------------+---------------+
 as of this encounter
 
 Plan of Treatment
 
 
+--------+---------+-----------+----------------------+-------------+
| Date   | Type    | Specialty | Care Team            | Description |
+--------+---------+-----------+----------------------+-------------+
| 04/10/ | Office  | Podiatry  |   Srinivasan Jordan,  |             |
| 2019   | Visit   |           | DPM  [address], |             |
|        |         |           |  [address]  [facility],  |             |
|        |         |           | [place] [address]             |             |
|        |         |           | [phone]         |             |
|        |         |           | [phone] (Fax)   |             |
+--------+---------+-----------+----------------------+-------------+
 as of this encounter
 
 Visit Diagnoses
 Not on filein this encounter

## 2019-04-09 NOTE — XMS
Encounter Summary
  Created on: 2019
 
 Cherie Villalobos
 External Reference #: [id number]
 : 48
 Sex: Female
 
 Demographics
 
 
+-----------------------+--------------------------+
| Address               | [address]               |
|                       | [place]  [address] |
+-----------------------+--------------------------+
| Home Phone            | [phone]          |
+-----------------------+--------------------------+
| Preferred Language    | Unknown                  |
+-----------------------+--------------------------+
| Marital Status        |                   |
+-----------------------+--------------------------+
| Rastafarian Affiliation | 1013                     |
+-----------------------+--------------------------+
| Race                  | Unknown                  |
+-----------------------+--------------------------+
| Ethnic Group          | Unknown                  |
+-----------------------+--------------------------+
 
 
 Author
 
 
+--------------+-----------------------+
| Author       | Sherry Showcase |
+--------------+-----------------------+
| Organization | [facility] |
+--------------+-----------------------+
| Address      | Unknown               |
+--------------+-----------------------+
| Phone        | Unavailable           |
+--------------+-----------------------+
 
 
 
 Support
 
 
+---------------+--------------+---------------------+-----------------+
| Name          | Relationship | Address             | Phone           |
+---------------+--------------+---------------------+-----------------+
| Anoop Villalobos | ECON         | [place], | [phone] |
|               |              |  [place]  [address]     |                 |
+---------------+--------------+---------------------+-----------------+
| Jennifer Dickson | ECON         | [place]       | [phone] |
|               |              | [phone]               |                 |
+---------------+--------------+---------------------+-----------------+
 
 
 
 
 Care Team Providers
 
 
+-----------------------+------+-----------------+
| Care Team Member Name | Role | Phone           |
+-----------------------+------+-----------------+
| vIy Couch DO      | PCP  | [phone] |
+-----------------------+------+-----------------+
 
 
 
 Encounter Details
 
 
+--------+-------------+----------------------+---------------------+-------------+
| Date   | Type        | Department           | Care Team           | Description |
+--------+-------------+----------------------+---------------------+-------------+
| / | Orders Only |   [facility]      |   Dionne Danielson MA |             |
| 2019   |             | Vascular Surgery     |                     |             |
|        |             | [address] |                     |             |
|        |             |  E  [place]     |                     |             |
|        |             | [address]           |                     |             |
|        |             | [phone]         |                     |             |
+--------+-------------+----------------------+---------------------+-------------+
 
 
 
 Social History
 
 
+---------------+------------+-----------+--------+------------------+
| Tobacco Use   | Types      | Packs/Day | Years  | Date             |
|               |            |           | Used   |                  |
+---------------+------------+-----------+--------+------------------+
| Former Smoker | Cigarettes | 1         | 30     | Quit: 2004 |
+---------------+------------+-----------+--------+------------------+
 
 
 
+---------------------+---+---+---+
| Smokeless Tobacco:  |   |   |   |
| Never Used          |   |   |   |
+---------------------+---+---+---+
 
 
 
+------------------------------+
| Comments: quite smoking 2004 |
+------------------------------+
 
 
 
+-------------+-----------+---------+----------+
| Alcohol Use | Drinks/We | oz/Week | Comments |
|             | ek        |         |          |
+-------------+-----------+---------+----------+
| No          |           |         |          |
+-------------+-----------+---------+----------+
 
 
 
 
+------------------+---------------+
| Sex Assigned at  | Date Recorded |
| Birth            |               |
+------------------+---------------+
| Not on file      |               |
+------------------+---------------+
 as of this encounter
 
 Plan of Treatment
 
 
+--------+---------+-----------+----------------------+-------------+
| Date   | Type    | Specialty | Care Team            | Description |
+--------+---------+-----------+----------------------+-------------+
| 04/10/ | Office  | Podiatry  |   Srinivasan Jordan,  |             |
| 2019   | Visit   |           | DPM  [address], |             |
|        |         |           |  [address]  [place],  |             |
|        |         |           | [place] [address]             |             |
|        |         |           | [phone]         |             |
|        |         |           | [phone] (Fax)   |             |
+--------+---------+-----------+----------------------+-------------+
 as of this encounter
 
 Visit Diagnoses
 Not on filein this encounter

## 2019-04-09 NOTE — XMS
Encounter Summary
  Created on: 2019
 
 Cherie Villalobos
 External Reference #: [id number]
 : 48
 Sex: Female
 
 Demographics
 
 
+-----------------------+--------------------------+
| Address               | [address]               |
|                       | [place]  [address] |
+-----------------------+--------------------------+
| Home Phone            | [phone]          |
+-----------------------+--------------------------+
| Preferred Language    | Unknown                  |
+-----------------------+--------------------------+
| Marital Status        |                   |
+-----------------------+--------------------------+
| Christianity Affiliation | 1013                     |
+-----------------------+--------------------------+
| Race                  | Unknown                  |
+-----------------------+--------------------------+
| Ethnic Group          | Unknown                  |
+-----------------------+--------------------------+
 
 
 Author
 
 
+--------------+--------------------------------------------+
| Author       | [facility]  |
|              | and [place]                                |
+--------------+--------------------------------------------+
| Organization | [facility]  |
|              | [facility] [place]                                |
+--------------+--------------------------------------------+
| Address      | Unknown                                    |
+--------------+--------------------------------------------+
| Phone        | Unavailable                                |
+--------------+--------------------------------------------+
 
 
 
 Support
 
 
+---------------+--------------+---------------------+-----------------+
| Name          | Relationship | Address             | Phone           |
+---------------+--------------+---------------------+-----------------+
| Anoop Villalobos | ECON         | [address], | [phone] |
|               |              |  [place]  [address]     |                 |
+---------------+--------------+---------------------+-----------------+
| Jennifer Dickson | ECON         | [place], [place]       | [phone] |
|               |              | [address]               |                 |
+---------------+--------------+---------------------+-----------------+
 
 
 
 
 Care Team Providers
 
 
+--------------------------+------+-----------------+
| Care Team Member Name    | Role | Phone           |
+--------------------------+------+-----------------+
| Araseli Montelongo Activity  | PCP  | [phone] |
| Professional             |      |                 |
+--------------------------+------+-----------------+
 
 
 
 Reason for Visit
 
 
+--------+----------+
| Reason | Comments |
+--------+----------+
| Other  | angina   |
+--------+----------+
 
 
 
 Encounter Details
 
 
+--------+-----------+----------------------+----------------------+----------------+
| Date   | Type      | Department           | Care Team            | Description    |
+--------+-----------+----------------------+----------------------+----------------+
| / | Telephone |   [facility]          |   Johnie John, | Other (angina) |
| 2019   |           | CARDIOLOGY  [address]    |  MD  [address] |                |
|        |           | [address]  [place], |  St[address]  [place],   |                |
|        |           |  [place] [address]       | [place] [address]             |                |
|        |           | [phone]         | [phone]         |                |
|        |           |                      | [phone] (Fax)   |                |
+--------+-----------+----------------------+----------------------+----------------+
 
 
 
 Social History
 
 
+---------------+------------+-----------+--------+------------------+
| Tobacco Use   | Types      | Packs/Day | Years  | Date             |
|               |            |           | Used   |                  |
+---------------+------------+-----------+--------+------------------+
| Former Smoker | Cigarettes | 1         | 30     | Quit: 2004 |
+---------------+------------+-----------+--------+------------------+
 
 
 
+---------------------+---+---+---+
| Smokeless Tobacco:  |   |   |   |
| Never Used          |   |   |   |
+---------------------+---+---+---+
 
 
 
 
+-------------+-----------+---------+----------+
| Alcohol Use | Drinks/We | oz/Week | Comments |
|             | ek        |         |          |
+-------------+-----------+---------+----------+
| No          |           |         |          |
+-------------+-----------+---------+----------+
 
 
 
+------------------+---------------+
| Sex Assigned at  | Date Recorded |
| Birth            |               |
+------------------+---------------+
| Not on file      |               |
+------------------+---------------+
 
 
 
+----------------+-------------+-------------+
| Job Start Date | Occupation  | Industry    |
+----------------+-------------+-------------+
| Not on file    | Not on file | Not on file |
+----------------+-------------+-------------+
 
 
 
+----------------+--------------+------------+
| Travel History | Travel Start | Travel End |
+----------------+--------------+------------+
 
 
 
+-------------------------------------+
| No recent travel history available. |
+-------------------------------------+
 documented as of this encounter
 
 Functional Status
 
 
+---------------------------------------------+----------+--------------------+
| Functional Status                           | Response | Date of Assessment |
+---------------------------------------------+----------+--------------------+
| Are you deaf or do you have serious         | No       | 2018         |
| difficulty hearing?                         |          |                    |
+---------------------------------------------+----------+--------------------+
| Are you blind or do you have serious        | No       | 2018         |
| difficulty seeing, even when wearing        |          |                    |
| glasses?                                    |          |                    |
+---------------------------------------------+----------+--------------------+
| Do you have serious difficulty walking or   | No       | 2018         |
| climbing stairs? (5 years old or older)     |          |                    |
+---------------------------------------------+----------+--------------------+
| Do you have difficulty dressing or bathing? | No       | 2018         |
|  (5 years old or older)                     |          |                    |
+---------------------------------------------+----------+--------------------+
| Because of a physical, mental, or emotional | No       | 2018         |
|  condition, do you have difficulty doing    |          |                    |
| errands alone such as visiting a doctor's   |          |                    |
 
| office or shopping? [15 years old or        |          |                    |
| older)]                                     |          |                    |
+---------------------------------------------+----------+--------------------+
 
 
 
+---------------------------------------------+----------+--------------------+
| Cognitive Status                            | Response | Date of Assessment |
+---------------------------------------------+----------+--------------------+
| Because of a physical, mental, or emotional | No       | 2018         |
|  condition, do you have serious difficulty  |          |                    |
| concentrating, remembering, or making       |          |                    |
| decisions? (5 years old or older)           |          |                    |
+---------------------------------------------+----------+--------------------+
 documented as of this encounter
 
 Plan of Treatment
 
 
+--------+---------+------------+----------------------+-------------+
| Date   | Type    | Specialty  | Care Team            | Description |
+--------+---------+------------+----------------------+-------------+
| / | Office  | Cardiology |   Johnie John, |             |
|    | Visit   |            |  MD  [address] |             |
|        |         |            |  [place]. [place],   |             |
|        |         |            | [place] [address]             |             |
|        |         |            | [phone]         |             |
|        |         |            | [phone] (Fax)   |             |
+--------+---------+------------+----------------------+-------------+
 documented as of this encounter
 
 Visit Diagnoses
 Not on filedocumented in this encounter

## 2019-04-09 NOTE — XMS
Encounter Summary
  Created on: 2019
 
 Cherie Villalobos
 External Reference #: [id number]
 : 48
 Sex: Female
 
 Demographics
 
 
+-----------------------+--------------------------+
| Address               | [address]               |
|                       | [place]  [address] |
+-----------------------+--------------------------+
| Home Phone            | [phone]          |
+-----------------------+--------------------------+
| Preferred Language    | Unknown                  |
+-----------------------+--------------------------+
| Marital Status        |                   |
+-----------------------+--------------------------+
| Roman Catholic Affiliation | 1013                     |
+-----------------------+--------------------------+
| Race                  | Unknown                  |
+-----------------------+--------------------------+
| Ethnic Group          | Unknown                  |
+-----------------------+--------------------------+
 
 
 Author
 
 
+--------------+-----------------------+
| Author       | Sherry Bizible |
+--------------+-----------------------+
| Organization | [facility] |
+--------------+-----------------------+
| Address      | Unknown               |
+--------------+-----------------------+
| Phone        | Unavailable           |
+--------------+-----------------------+
 
 
 
 Support
 
 
+---------------+--------------+---------------------+-----------------+
| Name          | Relationship | Address             | Phone           |
+---------------+--------------+---------------------+-----------------+
| Anoop Villalobos | ECON         | [place], | [phone] |
|               |              |  [place]  [address]     |                 |
+---------------+--------------+---------------------+-----------------+
| Jennifer Dickson | ECON         | [place]       | [phone] |
|               |              | [phone]               |                 |
+---------------+--------------+---------------------+-----------------+
 
 
 
 
 Care Team Providers
 
 
+-----------------------+------+-----------------+
| Care Team Member Name | Role | Phone           |
+-----------------------+------+-----------------+
| Ivy Couch DO      | PCP  | [phone] |
+-----------------------+------+-----------------+
 
 
 
 Encounter Details
 
 
+--------+-----------+----------------------+----------------------+-------------+
| Date   | Type      | Department           | Care Team            | Description |
+--------+-----------+----------------------+----------------------+-------------+
| / | Telephone |   [facility]      |   Kristen Tam, |             |
| 2019   |           | Vascular Surgery     |  RN                  |             |
|        |           | [address] |                      |             |
|        |           |  E  [place]     |                      |             |
|        |           | [address]           |                      |             |
|        |           | [phone]         |                      |             |
+--------+-----------+----------------------+----------------------+-------------+
 
 
 
 Social History
 
 
+---------------+------------+-----------+--------+------------------+
| Tobacco Use   | Types      | Packs/Day | Years  | Date             |
|               |            |           | Used   |                  |
+---------------+------------+-----------+--------+------------------+
| Former Smoker | Cigarettes | 1         | 30     | Quit: 2004 |
+---------------+------------+-----------+--------+------------------+
 
 
 
+---------------------+---+---+---+
| Smokeless Tobacco:  |   |   |   |
| Never Used          |   |   |   |
+---------------------+---+---+---+
 
 
 
+------------------------------+
| Comments: quite smoking 2004 |
+------------------------------+
 
 
 
+-------------+-----------+---------+----------+
| Alcohol Use | Drinks/We | oz/Week | Comments |
|             | ek        |         |          |
+-------------+-----------+---------+----------+
| No          |           |         |          |
+-------------+-----------+---------+----------+
 
 
 
 
+------------------+---------------+
| Sex Assigned at  | Date Recorded |
| Birth            |               |
+------------------+---------------+
| Not on file      |               |
+------------------+---------------+
 as of this encounter
 
 Plan of Treatment
 
 
+--------+---------+-----------+----------------------+-------------+
| Date   | Type    | Specialty | Care Team            | Description |
+--------+---------+-----------+----------------------+-------------+
| 04/10/ | Office  | Podiatry  |   Srinivasan Jordan,  |             |
| 2019   | Visit   |           | DPM  [address], |             |
|        |         |           |  [address]  [facility],  |             |
|        |         |           | [place] [address]             |             |
|        |         |           | [phone]         |             |
|        |         |           | [phone] (Fax)   |             |
+--------+---------+-----------+----------------------+-------------+
 as of this encounter
 
 Visit Diagnoses
 Not on filein this encounter

## 2019-04-09 NOTE — XMS
Encounter Summary
  Created on: 2019
 
 Cherie Villalobos
 External Reference #: [id number]
 : 48
 Sex: Female
 
 Demographics
 
 
+-----------------------+--------------------------+
| Address               | [address]               |
|                       | [place]  [address] |
+-----------------------+--------------------------+
| Home Phone            | [phone]          |
+-----------------------+--------------------------+
| Preferred Language    | Unknown                  |
+-----------------------+--------------------------+
| Marital Status        |                   |
+-----------------------+--------------------------+
| Worship Affiliation | [id number]                     |
+-----------------------+--------------------------+
| Race                  | Unknown                  |
+-----------------------+--------------------------+
| Ethnic Group          | Unknown                  |
+-----------------------+--------------------------+
 
 
 Author
 
 
+--------------+-----------------------+
| Author       | [employer] |
+--------------+-----------------------+
| Organization | [facility] |
+--------------+-----------------------+
| Address      | Unknown               |
+--------------+-----------------------+
| Phone        | Unavailable           |
+--------------+-----------------------+
 
 
 
 Support
 
 
+---------------+--------------+---------------------+-----------------+
| Name          | Relationship | Address             | Phone           |
+---------------+--------------+---------------------+-----------------+
| Anoop Villalobos | ECON         | [place], | [phone] |
|               |              |  [place]  [address]     |                 |
+---------------+--------------+---------------------+-----------------+
| Jennifer Dickson | ECON         | [place]       | [phone] |
|               |              | [id number]               |                 |
+---------------+--------------+---------------------+-----------------+
 
 
 
 
 Care Team Providers
 
 
+-----------------------+------+-----------------+
| Care Team Member Name | Role | Phone           |
+-----------------------+------+-----------------+
| Ivy Couch DO      | PCP  | [phone] |
+-----------------------+------+-----------------+
 
 
 
 Encounter Details
 
 
+--------+-------------+----------------------+---------------------+-------------+
| Date   | Type        | Department           | Care Team           | Description |
+--------+-------------+----------------------+---------------------+-------------+
| / | Orders Only |   Dominic Burnham    |   Jessica Marley,  |             |
| 2019   |             | [facility]  | RDMS                |             |
|        |             | Ultrasound  [address]      |                     |             |
|        |             | [address]           |                     |             |
|        |             | [place] [address]   |                     |             |
|        |             | [phone]         |                     |             |
+--------+-------------+----------------------+---------------------+-------------+
 
 
 
 Social History
 
 
+---------------+------------+-----------+--------+------------------+
| Tobacco Use   | Types      | Packs/Day | Years  | Date             |
|               |            |           | Used   |                  |
+---------------+------------+-----------+--------+------------------+
| Former Smoker | Cigarettes | 1         | 30     | Quit: 2004 |
+---------------+------------+-----------+--------+------------------+
 
 
 
+---------------------+---+---+---+
| Smokeless Tobacco:  |   |   |   |
| Never Used          |   |   |   |
+---------------------+---+---+---+
 
 
 
+------------------------------+
| Comments: quite smoking 2004 |
+------------------------------+
 
 
 
+-------------+-----------+---------+----------+
| Alcohol Use | Drinks/We | oz/Week | Comments |
|             | ek        |         |          |
+-------------+-----------+---------+----------+
| No          |           |         |          |
+-------------+-----------+---------+----------+
 
 
 
 
+------------------+---------------+
| Sex Assigned at  | Date Recorded |
| Birth            |               |
+------------------+---------------+
| Not on file      |               |
+------------------+---------------+
 as of this encounter
 
 Plan of Treatment
 
 
+--------+---------+-----------+----------------------+-------------+
| Date   | Type    | Specialty | Care Team            | Description |
+--------+---------+-----------+----------------------+-------------+
| 04/10/ | Office  | Podiatry  |   Srinivasan Jordan,  |             |
| 2019   | Visit   |           | DPM  [address], |             |
|        |         |           |  [address]  [place],  |             |
|        |         |           | [place] [address]             |             |
|        |         |           | [phone]         |             |
|        |         |           | [phone] (Fax)   |             |
+--------+---------+-----------+----------------------+-------------+
 as of this encounter
 
 Visit Diagnoses
 Not on filein this encounter

## 2019-04-09 NOTE — XMS
Encounter Summary
  Created on: 2019
 
 Cherie Villalobos
 External Reference #: [id number]
 : 48
 Sex: Female
 
 Demographics
 
 
+-----------------------+--------------------------+
| Address               | [address]               |
|                       | [place]  [address] |
+-----------------------+--------------------------+
| Home Phone            | [phone]          |
+-----------------------+--------------------------+
| Preferred Language    | Unknown                  |
+-----------------------+--------------------------+
| Marital Status        |                   |
+-----------------------+--------------------------+
| Sabianism Affiliation | [id number]                     |
+-----------------------+--------------------------+
| Race                  | Unknown                  |
+-----------------------+--------------------------+
| Ethnic Group          | Unknown                  |
+-----------------------+--------------------------+
 
 
 Author
 
 
+--------------+-----------------------+
| Author       | Sherry Alerts |
+--------------+-----------------------+
| Organization | [facility] |
+--------------+-----------------------+
| Address      | Unknown               |
+--------------+-----------------------+
| Phone        | Unavailable           |
+--------------+-----------------------+
 
 
 
 Support
 
 
+---------------+--------------+---------------------+-----------------+
| Name          | Relationship | Address             | Phone           |
+---------------+--------------+---------------------+-----------------+
| Anoop Villalobos | ECON         | [place], | [phone] |
|               |              |  [place]  [address]     |                 |
+---------------+--------------+---------------------+-----------------+
| Jennifer Dickson | ECON         | [place]       | [phone] |
|               |              | [phone]               |                 |
+---------------+--------------+---------------------+-----------------+
 
 
 
 
 Care Team Providers
 
 
+-----------------------+------+-----------------+
| Care Team Member Name | Role | Phone           |
+-----------------------+------+-----------------+
| Ivy Couch DO      | PCP  | [phone] |
+-----------------------+------+-----------------+
 
 
 
 Reason for Visit
 
 
+---------------------+----------+
| Reason              | Comments |
+---------------------+----------+
| Multiple Falls      |          |
+---------------------+----------+
| Circulatory Problem |          |
+---------------------+----------+
| Referral            |          |
+---------------------+----------+
 
 
 
 Encounter Details
 
 
+--------+-----------+----------------------+----------------------+-------------+
| Date   | Type      | Department           | Care Team            | Description |
+--------+-----------+----------------------+----------------------+-------------+
| / | Emergency |   [facility]    |   Chau Deleon,  |             |
|    |           | [facility]       | MD [address] [address]   |             |
|        |           | Emergency Department | EMERGENCY DEPARTMENT |             |
|        |           |   [address]     |   [place] [address] |             |
|        |           | [place] [address]   |   [phone]       |             |
|        |           | [phone]         | [phone] (Fax)   |             |
+--------+-----------+----------------------+----------------------+-------------+
 
 
 
 Social History
 
 
+---------------+------------+-----------+--------+------------------+
| Tobacco Use   | Types      | Packs/Day | Years  | Date             |
|               |            |           | Used   |                  |
+---------------+------------+-----------+--------+------------------+
| Former Smoker | Cigarettes | 1         | 30     | Quit: 2004 |
+---------------+------------+-----------+--------+------------------+
 
 
 
+---------------------+---+---+---+
| Smokeless Tobacco:  |   |   |   |
| Never Used          |   |   |   |
+---------------------+---+---+---+
 
 
 
 
+------------------------------+
| Comments: quite smoking  |
+------------------------------+
 
 
 
+-------------+-----------+---------+----------+
| Alcohol Use | Drinks/We | oz/Week | Comments |
|             | ek        |         |          |
+-------------+-----------+---------+----------+
| No          |           |         |          |
+-------------+-----------+---------+----------+
 
 
 
+------------------+---------------+
| Sex Assigned at  | Date Recorded |
| Birth            |               |
+------------------+---------------+
| Not on file      |               |
+------------------+---------------+
 as of this encounter
 
 Last Filed Vital Signs
 
 
+-------------------+----------------------+-------------------------+
| Vital Sign        | Reading              | Time Taken              |
+-------------------+----------------------+-------------------------+
| Blood Pressure    | 137/63               | 2019  4:05 PM PST |
+-------------------+----------------------+-------------------------+
| Pulse             | 87                   | 2019  4:05 PM PST |
+-------------------+----------------------+-------------------------+
| Temperature       | 36.3   C (97.3   F)  | 2019  4:05 PM PST |
+-------------------+----------------------+-------------------------+
| Respiratory Rate  | 16                   | 2019  4:05 PM PST |
+-------------------+----------------------+-------------------------+
| Oxygen Saturation | -                    | -                       |
+-------------------+----------------------+-------------------------+
| Inhaled Oxygen    | -                    | -                       |
| Concentration     |                      |                         |
+-------------------+----------------------+-------------------------+
| Weight            | 65.6 kg (144 lb 11.7 | 2019  4:05 PM PST |
|                   |  oz)                 |                         |
+-------------------+----------------------+-------------------------+
| Height            | -                    | -                       |
+-------------------+----------------------+-------------------------+
| Body Mass Index   | 20.77                | 2019  4:05 PM PST |
+-------------------+----------------------+-------------------------+
 in this encounter
 
 Medications at Time of Discharge
 
 
+----------------------+----------------------+--------+---------+----------+-----------+
| Medication           | Sig.                 | Disp.  | Refills | Start    | End Date  |
|                      |                      |        |         | Date     |           |
+----------------------+----------------------+--------+---------+----------+-----------+
 
|   albuterol          | Inhale 2 puffs into  |        |         |          |           |
| (PROVENTIL           | the lungs every 4    |        |         |          |           |
| HFA;VENTOLIN HFA)    | (four) hours as      |        |         |          |           |
| 108 (90 Base)        | needed for Wheezing. |        |         |          |           |
| MCG/ACT              |                      |        |         |          |           |
| inhalerIndications:  |                      |        |         |          |           |
| states uses 2x       |                      |        |         |          |           |
| monthly average      |                      |        |         |          |           |
+----------------------+----------------------+--------+---------+----------+-----------+
|   apixaban (ELIQUIS) | Take 1 tablet by     |   60   | 0       | 20 |           |
|  2.5 MG tablet       | mouth 2 (two) times  | tablet |         | 18       |           |
|                      | daily.               |        |         |          |           |
+----------------------+----------------------+--------+---------+----------+-----------+
|   atorvastatin       | Take 80 mg by mouth  |        |         | 20 |           |
| (LIPITOR) 80 MG      | nightly.             |        |         | 19       |           |
| tablet               |                      |        |         |          |           |
+----------------------+----------------------+--------+---------+----------+-----------+
|   carvedilol (COREG) | Take 1 tablet by     |   60   | 0       | 20 |  |
|  12.5 MG tablet      | mouth 2 (two) times  | tablet |         | 19       | 0         |
|                      | daily with meals.    |        |         |          |           |
+----------------------+----------------------+--------+---------+----------+-----------+
|   esomeprazole       | Take 1 capsule by    |        |         |          |           |
| (NEXIUM) 40 MG       | mouth every day      |        |         |          |           |
| capsule              |                      |        |         |          |           |
+----------------------+----------------------+--------+---------+----------+-----------+
|                      | Take 1 tablet by     |   30   | 0       | 20 |           |
| HYDROcodone-acetamin | mouth every 4 (four) | tablet |         | 19       |           |
| ophen (NORCO) 5-325  |  hours as needed.    |        |         |          |           |
| MG per tablet        |                      |        |         |          |           |
+----------------------+----------------------+--------+---------+----------+-----------+
|   insulin aspart     | Inject  into the     |        |         |          |           |
| (NOVOLOG) 100        | skin 3 (three) times |        |         |          |           |
| UNIT/ML injection    |  daily before meals. |        |         |          |           |
|                      |  Sliding scale       |        |         |          |           |
+----------------------+----------------------+--------+---------+----------+-----------+
|   insulin degludec   | Inject 21 units      |        |         |          |           |
| (TRESIBA) 100        | every night          |        |         |          |           |
| UNIT/ML injection    |                      |        |         |          |           |
+----------------------+----------------------+--------+---------+----------+-----------+
|   isosorbide         | Take 1 tablet by     |   30   | 1       | 20 |  |
| mononitrate (IMDUR)  | mouth daily.         | tablet |         | 18       | 9         |
| 60 MG 24 hr tablet   |                      |        |         |          |           |
+----------------------+----------------------+--------+---------+----------+-----------+
|   losartan (COZAAR)  | Take 100 mg by mouth |        |         | 20 |           |
| 100 MG tablet        |  daily.              |        |         | 19       |           |
+----------------------+----------------------+--------+---------+----------+-----------+
|   nitroGLYCERIN      | Place 1 tablet under |   30   | 0       | 20 |  |
| (NITROSTAT) 0.4 MG   |  the tongue every 5  | tablet |         | 19       | 0         |
| SL tablet            | (five) minutes as    |        |         |          |           |
|                      | needed for Chest     |        |         |          |           |
|                      | pain.                |        |         |          |           |
+----------------------+----------------------+--------+---------+----------+-----------+
|   nortriptyline      | Take 25-75 mg by     |        |         |          |           |
| (PAMELOR) 25 MG      | mouth See Admin      |        |         |          |           |
| capsule              | Instructions. Takes  |        |         |          |           |
|                      | 25 mg by mouth every |        |         |          |           |
|                      |  morning and 75 mg   |        |         |          |           |
|                      | every night          |        |         |          |           |
+----------------------+----------------------+--------+---------+----------+-----------+
|   ondansetron        | Take 4 mg by mouth   |        |         | 20 |           |
 
| (ZOFRAN-ODT) 4 MG    | every 8 (eight)      |        |         | 18       |           |
| disintegrating       | hours as needed.     |        |         |          |           |
| tablet               |                      |        |         |          |           |
+----------------------+----------------------+--------+---------+----------+-----------+
|   oxybutynin         | Take 7.5 mg by mouth |        |         |          |           |
| (DITROPAN) 5 MG      |  2 (two) times       |        |         |          |           |
| tablet               | daily.               |        |         |          |           |
+----------------------+----------------------+--------+---------+----------+-----------+
|   prochlorperazine 5 | TAKE ONE TABLET BY   |   60   | 11      | 20 |           |
|  MG tablet           | MOUTH TWICE DAILY AS | tablet |         | 19       |           |
|                      |  NEEDED FOR NAUSEA   |        |         |          |           |
+----------------------+----------------------+--------+---------+----------+-----------+
|   rOPINIRole         | Take 0.75 mg by      |        |         |          |           |
| (REQUIP) 0.25 MG     | mouth nightly.       |        |         |          |           |
| tablet               |                      |        |         |          |           |
+----------------------+----------------------+--------+---------+----------+-----------+
|   TRINTELLIX 20 MG   | Take 20 mg by mouth  |        |         | 20 |           |
| TABS                 | every evening.       |        |         | 19       |           |
+----------------------+----------------------+--------+---------+----------+-----------+
|   albuterol          | Ventolin HFA 90      |        |         |          |  |
| (VENTOLIN HFA) 108   | mcg/actuation        |        |         |          | 9         |
| (90 Base) MCG/ACT    | aerosol inhaler      |        |         |          |           |
| inhaler              | Inhale 2 puffs every |        |         |          |           |
|                      |  4 hours by          |        |         |          |           |
|                      | inhalation route as  |        |         |          |           |
|                      | needed.              |        |         |          |           |
+----------------------+----------------------+--------+---------+----------+-----------+
|   atorvastatin       | Take 1 tablet by     |   30   | 0       | 20 |  |
| (LIPITOR) 40 MG      | mouth nightly.       | tablet |         | 19       | 9         |
| tablet               |                      |        |         |          |           |
+----------------------+----------------------+--------+---------+----------+-----------+
|   bisacodyl          | Place 10 mg          |        |         |          |  |
| (DULCOLAX) 10 MG     | rectally.            |        |         |          | 9         |
| suppository          |                      |        |         |          |           |
+----------------------+----------------------+--------+---------+----------+-----------+
|   calcium acetate    | 667 mg 3 (three)     |        |         | 20 |  |
| (PHOSLO) 667 MG      | times daily with     |        |         | 16       | 9         |
| capsule              | meals.               |        |         |          |           |
+----------------------+----------------------+--------+---------+----------+-----------+
|   Cholecalciferol    | Take 5,000 capsules  |        |         |          |  |
| (VITAMIN D3) 5000    | by mouth every other |        |         |          | 9         |
| UNITS CAPS           |  day.                |        |         |          |           |
+----------------------+----------------------+--------+---------+----------+-----------+
|   clopidogrel        | Take 1 tablet by     |   30   | 11      | 20 |  |
| (PLAVIX) 75 MG       | mouth daily.         | tablet |         | 18       | 9         |
| tablet               |                      |        |         |          |           |
+----------------------+----------------------+--------+---------+----------+-----------+
|   loperamide         | 2 mg as needed.      |        |         |          |  |
| (IMODIUM) 2 MG       |                      |        |         |          | 9         |
| capsule              |                      |        |         |          |           |
+----------------------+----------------------+--------+---------+----------+-----------+
|   LORazepam (ATIVAN) | Take 1 tablet by     |   30   | 0       | 20 | 02/15/201 |
|  1 MG tablet         | mouth every 8        | tablet |         | 19       | 9         |
|                      | (eight) hours as     |        |         |          |           |
|                      | needed for Anxiety.  |        |         |          |           |
+----------------------+----------------------+--------+---------+----------+-----------+
|   Magnesium          | Take 1 tablet by     |        |         |          |  |
| Cl-Calcium Carbonate | mouth every other    |        |         |          | 9         |
|  (SLOW-MAG) 71.5-119 | day.                 |        |         |          |           |
|  MG TBEC             |                      |        |         |          |           |
 
+----------------------+----------------------+--------+---------+----------+-----------+
|   ranitidine         | ranitidine 150 mg    |        |         |          |  |
| (ZANTAC) 150 MG      | tablet Take 1 tablet |        |         |          | 9         |
| tablet               |  twice a day by oral |        |         |          |           |
|                      |  route.              |        |         |          |           |
+----------------------+----------------------+--------+---------+----------+-----------+
|   Vortioxetine HBr   | 10 mg nightly.       |        |         |          |  |
| 10 MG TABS           |                      |        |         |          | 9         |
+----------------------+----------------------+--------+---------+----------+-----------+
 as of this encounter
 
 Plan of Treatment
 
 
+--------+---------+-----------+----------------------+-------------+
| Date   | Type    | Specialty | Care Team            | Description |
+--------+---------+-----------+----------------------+-------------+
| 04/10/ | Office  | Podiatry  |   Srinivasan Jordan,  |             |
| 2019   | Visit   |           | DPM  [address] [facility], |             |
|        |         |           |  [address]  [place],  |             |
|        |         |           | [place] [address]             |             |
|        |         |           | [phone]         |             |
|        |         |           | [phone] (Fax)   |             |
+--------+---------+-----------+----------------------+-------------+
 as of this encounter
 
 Procedures
 
 
+-----------------+--------+-------------+----------------------+----------------------+
| Procedure Name  | Priori | Date/Time   | Associated Diagnosis | Comments             |
|                 | ty     |             |                      |                      |
+-----------------+--------+-------------+----------------------+----------------------+
| ED INFORMATION  | Routin | 2019  |                      |   Results for this   |
| EXCHANGE        | e      |  3:58 PM    |                      | procedure are in the |
|                 |        | PST         |                      |  results section.    |
+-----------------+--------+-------------+----------------------+----------------------+
 in this encounter
 
 Results
 ED INFORMATION EXCHANGE (2019  3:58 PM)
 
+------------------------------------------------------------------------+--------------+
| Narrative                                                              | Performed At |
+------------------------------------------------------------------------+--------------+
|   [id number]     Criteria Met         Care        |   ED         |
| Guidelines      10 in      Security and Safety  No recent Security   | INFORMATION  |
| Events currently on file     ED Care Guidelines from [facility] -        | EXCHANGE     |
| [place]  Last Updated: 18 10:16 AM         Other Information:    |              |
| Currently being seen by [facility] in [place] for mental health        |              |
| [phone]  These are guidelines and the provider should    |              |
| exercise clinical judgment when providing care.  ED Care Guidelines    |              |
| from [facility]  Last Updated: 17 10:44 AM         Care |              |
|  Coordination:  This patient has been identified as having at          |              |
| leastEmergency Departments visits in the 12 months immediately         |              |
| preceding the date these guidelines were entered.Patient requires      |              |
| education on appropriate ED usage.Emphasize the importance of using    |              |
| outpatient   medical services for the treatment of chronic conditions. |              |
|   Please contact Community Health WorkerWillard at [phone] if   |              |
| patient is seen in ED.  These are guidelines and the provider should   |              |
 
| exercise clinical judgment when providing care.  These are guidelines  |              |
| and the provider should exercise clinical judgment when providing      |              |
| care.           Prescription Drug Report (12 Mo.)  PDMP query found no |              |
|  report.        E.D. Visit Count (12 mo.)  Facility Visits Low Acuity  |              |
|   [facility] 18 0   [facility] 2 0   [facility]   |              |
| [facility] 4 0   Total 24 0   Note: Visits indicate total |              |
|  known visits. Medicaid Low Acuity Dx are the number of primary        |              |
| diagnoses on the Medicaid's Low Acuity dx list.         Recent         |              |
| Emergency Department Visit Summary  Showing 10 most recent visits out  |              |
| of 24 in the past 12 months  Date [facility] Type Diagnoses   |              |
| or Chief Complaint   2019 [facility] [place]       |              |
| Emergency          Multiple Falls        Referral        Circulatory   |              |
| Problem      2019 [facility] [place] [place] Emergency      |              |
|      ABD PAIN        Other chest pain      2019 [facility]         |              |
| [facility] [place] Emergency          Foot Pain      [facility]              [facility] [place] [place] [facility]          Chest Pain          |              |
| Nausea        Shortness of Breath        Chest pain, unspecified       |              |
|      Elevated blood-pressure reading, without diagnosis of             |              |
| hypertension        Presence of aortocoronary bypass graft             |              |
| Other specified postprocedural states      2019 [facility]  |              |
| [facility] [place] Emergency          CHEST PAIN        Abnormal levels  |              |
| of other serum enzymes        Personal history of other diseases of    |              |
| the circulatory system        Chest pain, unspecified      2019 |              |
|  [facility] [place]. [place] Emergency          FISTULA BLEEDING    |              |
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
| grafts, initial encounter      Dec 22, 2018 [facility]       |              |
| [place] [facility]          CHEST PAIN        Type 2 diabetes         |              |
| mellitus with hyperglycemia        Chest pain, unspecified      Nov    |              |
| 2018 [facility] [place] Emergency    Chief Complaint:   |              |
| SOB      2018 [facility] [place] [place] [facility]         |              |
|     FALL        Unspecified fall, initial encounter        Dependence  |              |
| on renal dialysis        End stage renal disease      2018     |              |
| [facility] [place] [place] [facility]          FALL                 |              |
| Essential (primary) hypertension        Type 2 diabetes mellitus with  |              |
| hyperglycemia        Type 2 diabetes mellitus with unspecified         |              |
| complications        Unspecified fall, initial encounter               |              |
| Headache            Recent Inpatient Visit Summary  Showing 10 most    |              |
| recent visits out of 11 in the past 12 months  Date [facility]      |              |
| State Type Diagnoses or Chief Complaint   2019 [facility] |              |
|  [place] Vascular Surgery          Foot Pain        End stage   |              |
| renal disease        Dependence on renal dialysis        Peripheral    |              |
| vascular disease, unspecified        Anemia in chronic kidney disease  |              |
|        Other disorders of plasma-protein metabolism, not elsewhere     |              |
| classified        Other specified personal risk factors, not elsewhere |              |
|  classified      [facility] [place]           |              |
| Recovery          Peripheral arterial disease, osteomyelitis left foot |              |
|         Other acute osteomyelitis, left ankle and foot        Long     |              |
| term (current) use of antibiotics        End stage renal disease       |              |
|      Anemia in chronic kidney disease      Dec 5, 2018 [facility] |              |
|  [facility] [place] [place] [facility]          Peripheral vascular disease, |              |
|  unspecified        End stage renal disease        Dependence on renal |              |
|  dialysis        Long term (current) use of insulin        Other       |              |
| chronic osteomyelitis, left ankle and foot        Type 2 diabetes      |              |
| mellitus with diabetic chronic kidney disease        Essential         |              |
| (primary) hypertension      [facility] [place] [facility]          Upper GIB        Other chronic osteomyelitis,    |              |
| unspecified ankle and foot        End stage renal disease        Other |              |
|  specified personal risk factors, not elsewhere classified             |              |
| Chronic systolic (congestive) heart failure        Anemia in chronic   |              |
| kidney disease        Other disorders of plasma-protein metabolism,    |              |
 
| not elsewhere classified        Moderate protein-calorie malnutrition  |              |
|        Other disorders of phosphorus metabolism      2018      |              |
| [place]. [place] Medical Surgical          1. Type 2      |              |
| diabetes mellitus with other specified complication        2. Urinary  |              |
| tract infection, site not specified        3. Unspecified              |              |
| protein-calorie malnutrition        4. Other chronic osteomyelitis,    |              |
| unspecified site        5. Hypertensive heart and chronic kidney       |              |
| disease with heart failure and with stage 5 chronic kidney disease, or |              |
|  end stage renal disease        6. Chronic diastolic (congestive)      |              |
| heart failure        7. Other osteomyelitis, ankle and foot        8.  |              |
| Type 2 diabetes mellitus with foot ulcer        9. Atherosclerotic     |              |
| heart disease of native coronary artery without angina pectoris        |              |
|  10. Chronic obstructive pulmonary disease, unspecified      ,    |              |
|  [facility] [place] [facility]          1. Benign |              |
|  paroxysmal vertigo, unspecified ear        2. End stage renal disease |              |
|         3. Hypertensive chronic kidney disease with stage 5 chronic    |              |
| kidney disease or end stage renal disease        4. Urinary tract      |              |
| infection, site not specified        5. Hypertensive heart and chronic |              |
|  kidney disease with heart failure and with stage 5 chronic kidney     |              |
| disease, or end stage renal disease        6. Kidney transplant status |              |
|         7. Unspecified systolic (congestive) heart failure        8.   |              |
| Syncope and collapse        9. Type 2 diabetes mellitus with diabetic  |              |
| chronic kidney disease        10. Atherosclerotic heart disease of     |              |
| native coronary artery without angina pectoris      Sep 15[facility] 2018       |              |
[facility] [place] [facility]          Acute     |              |
| ischemic heart disease, unspecified        Long term (current) use of  |              |
| insulin        Dependence on renal dialysis        End stage renal     |              |
| disease        Type 2 diabetes mellitus with diabetic chronic kidney   |              |
| disease        Essential (primary) hypertension        Anemia in       |              |
| chronic kidney disease      2018 [facility] |              |
|  Medical Surgical          0. Cough        1. Pneumonia due to         |              |
| Pseudomonas        2. End stage renal disease        3. Hypertensive   |              |
| heart and chronic kidney disease with heart failure and with stage 5   |              |
| chronic kidney disease, or end stage renal disease        4. Cachexia  |              |
|        5. Chronic obstructive pulmonary disease with acute lower       |              |
| respiratory infection        6. Type 2 diabetes mellitus with diabetic |              |
|  chronic kidney disease        7. Heart failure, unspecified        8. |              |
|  Hyperlipidemia, unspecified        9. Gastro-esophageal reflux        |              |
| disease without esophagitis      2018 [facility]       |              |
| [facility] Medical Surgical          0. Other chest pain        1.      |              |
| Gastro-esophageal reflux disease without esophagitis        2. End     |              |
| stage renal disease        3. Hypertensive heart and chronic kidney    |              |
| disease with heart failure and with stage 5 chronic kidney disease, or |              |
|  end stage renal disease        4. Chronic systolic (congestive) heart |              |
|  failure        5. Atherosclerotic heart disease of native coronary    |              |
| artery with other forms of angina pectoris        6. Type 2 diabetes   |              |
| mellitus with diabetic chronic kidney disease        7.                |              |
| Hyperlipidemia, unspecified        8. Major depressive disorder,       |              |
| single episode, unspecified        9. Chronic obstructive pulmonary    |              |
| disease, unspecified      Mar 6, 2018 [facility]     |              |
| [facility] Cardiology          Heart Failure        Need for iv access  |              |
|        Type 2 diabetes mellitus with other specified complication      |              |
|      Long term (current) use of insulin        Paroxysmal atrial       |              |
| fibrillation        Anemia in chronic kidney disease                   |              |
| Atherosclerotic heart disease of native coronary artery without angina |              |
|  pectoris        Cardiomyopathy, unspecified        End stage renal    |              |
| disease        Other specified postprocedural states            Care   |              |
| Providers  Provider PRC Type Phone Fax Service Dates   HEADINGS, SANTIAGO, |              |
|  F.N.P. Nurse Practitioner: Family     Current      Marco Antonio Martinez     |              |
| /Care Coordinator [phone]    2019 -          |              |
 
| Current      [facility]co Antonio Martinez Primary Care [phone]    2019 |              |
|  - Current      [facility] Primary Care    [phone]    |              |
| [phone] Current      [facility] Primary     |              |
| Care     Current      MANOLO GONZALEZ Primary Care     Current            |              |
| [employer] Mental Health Provider [phone]     |              |
| Current      [employer] Case or Care Manager     Current      [facility]   |              |
| MIRTA Sr Case or Care Manager [phone]     |              |
| [phone] Current      Jairo Gutierrez MD Other     Current           |              |
| [employer]  This patient has registered at the [facility]  |              |
| [facility] Emergency Department   For more information visit:      |              |
| [email or web address] |              |
| [id number]   The above information is provided for the sole purpose of     |              |
| patient treatment. Use of this information beyond the terms of Data    |              |
| Sharing Memorandum of Understanding and License Agreement is           |              |
| prohibited. In certain cases not all visits may be represented.        |              |
| Consult the aforementioned facilities for additional information.      |              |
| 2019 [employer] - [place], [place] -      |              |
| [email or web address]                                         |              |
+------------------------------------------------------------------------+--------------+
 
 
 
+-------------------------------------------------------------------------------------------
--------------------------------------------------------------------------------------------
--------------------+
| Procedure Note                                                                            
                                                                                            
                    |
+-------------------------------------------------------------------------------------------
--------------------------------------------------------------------------------------------
--------------------+
|   Interface, Lab - 2019  4:00 PM PST  Formatting of this note may be different      
                                                                                            
                    |
| from the original.[id number] [id number] Met  Care Guidelines  10 in     
                                                                                            
                    |
| 12Security and SafetyNo recent Security Events currently on fileED Care Guidelines from   
                                                                                            
                    |
| Myranda  HollycierraLast Updated: 18 10:16 AM  Other Information:Currently being      
                                                                                            
                    |
| seen by Myranda in [place] for mental health concerns.[phone] are            
                                                                                            
                    |
| guidelines and the provider should exercise clinical judgment when providing care.ED      
                                                                                            
                    |
| Care Guidelines from [facility] Updated: 17 10:44 AM  Care             
                                                                                            
                    |
| Coordination:This patient has been identified as having at leastEmergency Departments     
                                                                                            
                    |
| visits in the 12 months immediately preceding the date these guidelines were              
                                                                                            
                    |
| entered.Patient requires education on appropriate ED usage.Emphasize the importance of    
                                                                                            
 
                    |
| using outpatient medical services for the treatment of chronic conditions.Please contact  
                                                                                            
                    |
|  Community Health WorkerWillard at [phone] if patient is seen in ED.These are      
                                                                                            
                    |
| guidelines and the provider should exercise clinical judgment when providing care.These   
                                                                                            
                    |
| are guidelines and the provider should exercise clinical judgment when providing          
                                                                                            
                    |
| care.Prescription Drug Report (12 Mo.)PDMP query found no report.E.D. Visit Count (12     
                                                                                            
                    |
| mo.)Facility Visits Low Acuity [facility] 18 0 [facility] 2 0    
                                                                                            
                    |
| [facility] 4 0 Total 24 0 Note: Visits indicate total known visits.   
                                                                                            
                    |
| Medicaid Low Acuity Dx are the number of primary diagnoses on the Medicaid's Low Acuity   
                                                                                            
                    |
| dx list.  Recent Emergency Department Visit SummaryShowing 10 most recent visits out of   
                                                                                            
                    |
| 24 in the past 12 monthsDate Facility City State Type Diagnoses or Chief Complaint Feb    
                                                                                            
                    |
| 2019 [facility] [place]. [place] Emergency    Multiple Falls    Referral           
                                                                                            
                    |
| Circulatory Problem  2019 [facility]. [place] Emergency    ABD PAIN     
                                                                                            
                    |
|  Other chest pain  2019 [facility] [place] Emergency    Foot Pain     
                                                                                            
                    |
| 2019 [facility] [place] Emergency    Chest Pain    Nausea             
                                                                                            
                    |
| Shortness of Breath    Chest pain, unspecified    Elevated blood-pressure reading,        
                                                                                            
                    |
| without diagnosis of hypertension    Presence of aortocoronary bypass graft    Other      
                                                                                            
                    |
| specified postprocedural states  2019 [facility] [place]    
                                                                                            
                    |
|   CHEST PAIN    Abnormal levels of other serum enzymes    Personal history of other       
                                                                                            
                    |
| diseases of the circulatory system    Chest pain, unspecified  2019 [facility]  
                                                                                            
                    |
|  Health [place] Emergency    FISTULA BLEEDING    Hemorrhage due to vascular prosthetic  
                                                                                            
 
                    |
|  devices, implants and grafts, initial encounter  Dec 22, 2018 [facility]       
                                                                                            
                    |
| [place] Emergency    CHEST PAIN    Type 2 diabetes mellitus with hyperglycemia          
                                                                                            
                    |
| Chest pain, unspecified  2018 [facility] [place] [place] [facility]  Chief      
                                                                                            
                    |
| Complaint: SOB  2018 [facility] [place] [facility]    FALL             
                                                                                            
                    |
| Unspecified fall, initial encounter    Dependence on renal dialysis    End stage renal    
                                                                                            
                    |
| disease  2018 [facility] [place]    FALL    Essential       
                                                                                            
                    |
| (primary) hypertension    Type 2 diabetes mellitus with hyperglycemia    Type 2 diabetes  
                                                                                            
                    |
|  mellitus with unspecified complications    Unspecified fall, initial encounter           
                                                                                            
                    |
| Headache  Recent Inpatient Visit SummaryShowing 10 most recent visits out of 11 in the    
                                                                                            
                    |
| past 12 monthsDate Facility City State Type Diagnoses or Chief Complaint 2019     
                                                                                            
                    |
| [facility] [place] Vascular Surgery    Foot Pain    End stage renal disease   
                                                                                            
                    |
|    Dependence on renal dialysis    Peripheral vascular disease, unspecified    Anemia in  
                                                                                            
                    |
|  chronic kidney disease    Other disorders of plasma-protein metabolism, not elsewhere    
                                                                                            
                    |
| classified    Other specified personal risk factors, not elsewhere classified  Dec 27,    
                                                                                            
                    |
|  [facility] [place] Recovery    Peripheral arterial disease,              
                                                                                            
                    |
| osteomyelitis left foot    Other acute osteomyelitis, left ankle and foot    Long term    
                                                                                            
                    |
| (current) use of antibiotics    End stage renal disease    Anemia in chronic kidney       
                                                                                            
                    |
| disease  Dec 5, 2018 [facility] [place] General Medicine    Peripheral        
                                                                                            
                    |
| vascular disease, unspecified    End stage renal disease    Dependence on renal dialysis  
                                                                                            
                    |
|     Long term (current) use of insulin    Other chronic osteomyelitis, left ankle and     
                                                                                            
 
                    |
| foot    Type 2 diabetes mellitus with diabetic chronic kidney disease    Essential        
                                                                                            
                    |
| (primary) hypertension  2018 [facility] [place] Inpatient    Upper    
                                                                                            
                    |
| GIB    Other chronic osteomyelitis, unspecified ankle and foot    End stage renal         
                                                                                            
                    |
| disease    Other specified personal risk factors, not elsewhere classified    Chronic     
                                                                                            
                    |
| systolic (congestive) heart failure    Anemia in chronic kidney disease    Other          
                                                                                            
                    |
| disorders of plasma-protein metabolism, not elsewhere classified    Moderate              
                                                                                            
                    |
| protein-calorie malnutrition    Other disorders of phosphorus metabolism  2018    
                                                                                            
                    |
| [place]. [place] Medical Surgical    1. Type 2 diabetes mellitus with other  
                                                                                            
                    |
|  specified complication    2. Urinary tract infection, site not specified    3.           
                                                                                            
                    |
| Unspecified protein-calorie malnutrition    4. Other chronic osteomyelitis, unspecified   
                                                                                            
                    |
| site    5. Hypertensive heart and chronic kidney disease with heart failure and with      
                                                                                            
                    |
| stage 5 chronic kidney disease, or end stage renal disease    6. Chronic diastolic        
                                                                                            
                    |
| (congestive) heart failure    7. Other osteomyelitis, ankle and foot    8. Type 2         
                                                                                            
                    |
| diabetes mellitus with foot ulcer    9. Atherosclerotic heart disease of native coronary  
                                                                                            
                    |
|  artery without angina pectoris    10. Chronic obstructive pulmonary disease,             
                                                                                            
                    |
| unspecified  2018 [facility] [place] [facility]    1. Benign      
                                                                                            
                    |
| paroxysmal vertigo, unspecified ear    2. End stage renal disease    3. Hypertensive      
                                                                                            
                    |
| chronic kidney disease with stage 5 chronic kidney disease or end stage renal disease     
                                                                                            
                    |
|  4. Urinary tract infection, site not specified    5. Hypertensive heart and chronic      
                                                                                            
                    |
| kidney disease with heart failure and with stage 5 chronic kidney disease, or end stage   
                                                                                            
 
                    |
| renal disease    6. Kidney transplant status    7. Unspecified systolic (congestive)      
                                                                                            
                    |
| heart failure    8. Syncope and collapse    9. Type 2 diabetes mellitus with diabetic     
                                                                                            
                    |
| chronic kidney disease    10. Atherosclerotic heart disease of native coronary artery     
                                                                                            
                    |
| without angina pectoris  Sep 15, 2018 [facility]. [place] [facility]          
                                                                                            
                    |
| Surgical    Acute ischemic heart disease, unspecified    Long term (current) use of       
                                                                                            
                    |
| insulin    Dependence on renal dialysis    End stage renal disease    Type 2 diabetes     
                                                                                            
                    |
| mellitus with diabetic chronic kidney disease    Essential (primary) hypertension         
                                                                                            
                    |
| Anemia in chronic kidney disease  2018 [facility]. [place] [facility]      
                                                                                            
                    |
| Surgical    0. Cough    1. Pneumonia due to Pseudomonas    2. End stage renal disease     
                                                                                            
                    |
|  3. 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     
                                                                                            
                    |
| chronic kidney disease, or end stage renal disease    4. Cachexia    5. Chronic           
                                                                                            
                    |
| obstructive pulmonary disease with acute lower respiratory infection    6. Type 2         
                                                                                            
                    |
| diabetes mellitus with diabetic chronic kidney disease    7. Heart failure, unspecified   
                                                                                            
                    |
|    8. Hyperlipidemia, unspecified    9. Gastro-esophageal reflux disease without          
                                                                                            
                    |
| esophagitis  2018 [facility] [place] [facility]    0. Other       
                                                                                            
                    |
| chest pain    1. Gastro-esophageal reflux disease without esophagitis    2. End stage     
                                                                                            
                    |
| renal disease    3. Hypertensive heart and chronic kidney disease with heart failure and  
                                                                                            
                    |
|  with stage 5 chronic kidney disease, or end stage renal disease    4. Chronic systolic   
                                                                                            
                    |
| (congestive) heart failure    5. Atherosclerotic heart disease of native coronary artery  
                                                                                            
                    |
|  with other forms of angina pectoris    6. Type 2 diabetes mellitus with diabetic         
                                                                                            
 
                    |
| chronic kidney disease    7. Hyperlipidemia, unspecified    8. Major depressive           
                                                                                            
                    |
| disorder, single episode, unspecified    9. Chronic obstructive pulmonary disease,        
                                                                                            
                    |
| unspecified  Mar 6, 2018 [facility]. [place] [facility]    Heart       
                                                                                            
                    |
| Failure    Need for iv access    Type 2 diabetes mellitus with other specified            
                                                                                            
                    |
| complication    Long term (current) use of insulin    Paroxysmal atrial fibrillation      
                                                                                            
                    |
| Anemia in chronic kidney disease    Atherosclerotic heart disease of native coronary      
                                                                                            
                    |
| artery without angina pectoris    Cardiomyopathy, unspecified    End stage renal disease  
                                                                                            
                    |
|     Other specified postprocedural states  Care ProvidersProvider PRC Type Phone Fax      
                                                                                            
                    |
| Service Dates HEADINGS, SALEEM HENDERSONN.P. Nurse Practitioner: Family   Current  Michelle,      
                                                                                            
                    |
| Marco Antonio /Care Coordinator [phone]  2019 - Current  [facility]  
                                                                                            
                    |
|  Primary Care [phone]  2019 - Current  [facility]        
                                                                                            
                    |
| Primary Care  [phone] Current  [facility] Primary Care   
                                                                                            
                    |
|   Current  [facility] GONZALEZ Primary Care   Current  [employer] Mental Health Provider      
                                                                                            
                    |
| [phone] Current  [facility] Case or Care Manager   Current  [facility]  
                                                                                            
                    |
|  MIRTA Sr Case or Care Manager [phone] Current      
                                                                                            
                    |
| Jairo Gutierrez MD Other   Current  Collective PortalThis patient has registered at     
                                                                                            
                    |
| the [facility] Emergency Department For more information visit:       
                                                                                            
                    |
| [email or web address] The above    
                                                                                            
                    |
| information is provided for the sole purpose of patient treatment. Use of this            
                                                                                            
                    |
| information beyond the terms of Data Sharing Memorandum of Understanding and License      
                                                                                            
 
                    |
| Agreement is prohibited. In certain cases not all visits may be represented. Consult the  
                                                                                            
                    |
|  aforementioned facilities for additional information.  [employer]            
                                                                                            
                    |
| [employer]. [facility] [place], [place] - [email or web address]                  
                                                                                            
                    |
|   End stage renal disease                                                                 
                                                                                            
                    |
|   Anemia in chronic kidney disease                                                        
                                                                                            
                    |
|                                                                                           
                                                                                            
                    |
|Dec 5, 2018 [facility] General Medicine                                
                                                                                            
                    |
|  Peripheral vascular disease, unspecified                                                 
                                                                                            
                    |
|   End stage renal disease                                                                 
                                                                                            
                    |
|   Dependence on renal dialysis                                                            
                                                                                            
                    |
|   Long term (current) use of insulin                                                      
                                                                                            
                    |
|   Other chronic osteomyelitis, left ankle and foot                                        
                                                                                            
                    |
|   Type 2 diabetes mellitus with diabetic chronic kidney disease                           
                                                                                            
                    |
|   Essential (primary) hypertension                                                        
                                                                                            
                    |
|                                                                                           
                                                                                            
                    |
|2018 [facility] Inpatient                                      
                                                                                            
                    |
|  Upper GIB                                                                                
                                                                                            
                    |
|   Other chronic osteomyelitis, unspecified ankle and foot                                 
                                                                                            
                    |
|   End stage renal disease                                                                 
                                                                                            
                    |
|   Other specified personal risk factors, not elsewhere classified                         
                                                                                            
 
                    |
|   Chronic systolic (congestive) heart failure                                             
                                                                                            
                    |
|   Anemia in chronic kidney disease                                                        
                                                                                            
                    |
|   Other disorders of plasma-protein metabolism, not elsewhere classified                  
                                                                                            
                    |
|   Moderate protein-calorie malnutrition                                                   
                                                                                            
                    |
|   Other disorders of phosphorus metabolism                                                
                                                                                            
                    |
|                                                                                           
                                                                                            
                    |
|2018 [facility] Cox. [place] [facility]                                
                                                                                            
                    |
|  1. Type 2 diabetes mellitus with other specified complication                            
                                                                                            
                    |
|   2. Urinary tract infection, site not specified                                          
                                                                                            
                    |
|   3. Unspecified protein-calorie malnutrition                                             
                                                                                            
                    |
|   4. Other chronic osteomyelitis, unspecified site                                        
                                                                                            
                    |
|   5. 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
onic kidney disease, or end stage renal disease                                             
                    |
|   6. Chronic diastolic (congestive) heart failure                                         
                                                                                            
                    |
|   7. Other osteomyelitis, ankle and foot                                                  
                                                                                            
                    |
|   8. Type 2 diabetes mellitus with foot ulcer                                             
                                                                                            
                    |
|   9. Atherosclerotic heart disease of native coronary artery without angina pectoris      
                                                                                            
                    |
|   10. Chronic obstructive pulmonary disease, unspecified                                  
                                                                                            
                    |
|                                                                                           
                                                                                            
                    |
|2018 [facility]. [place] [facility]                                 
                                                                                            
                    |
|  1. Benign paroxysmal vertigo, unspecified ear                                            
                                                                                            
 
                    |
|   2. End stage renal disease                                                              
                                                                                            
                    |
|   3. Hypertensive chronic kidney disease with stage 5 chronic kidney disease or end stage 
renal disease                                                                               
                    |
|   4. Urinary tract infection, site not specified                                          
                                                                                            
                    |
|   5. 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
onic kidney disease, or end stage renal disease                                             
                    |
|   6. Kidney transplant status                                                             
                                                                                            
                    |
|   7. Unspecified systolic (congestive) heart failure                                      
                                                                                            
                    |
|   8. Syncope and collapse                                                                 
                                                                                            
                    |
|   9. Type 2 diabetes mellitus with diabetic chronic kidney disease                        
                                                                                            
                    |
|   10. Atherosclerotic heart disease of native coronary artery without angina pectoris     
                                                                                            
                    |
|                                                                                           
                                                                                            
                    |
|[facility] [place] [facility]                           
                                                                                            
                    |
|  Acute ischemic heart disease, unspecified                                                
                                                                                            
                    |
|   Long term (current) use of insulin                                                      
                                                                                            
                    |
|   Dependence on renal dialysis                                                            
                                                                                            
                    |
|   End stage renal disease                                                                 
                                                                                            
                    |
|   Type 2 diabetes mellitus with diabetic chronic kidney disease                           
                                                                                            
                    |
|   Essential (primary) hypertension                                                        
                                                                                            
                    |
|   Anemia in chronic kidney disease                                                        
                                                                                            
                    |
|                                                                                           
                                                                                            
                    |
|2018 [facility]. [place] [facility]                                
                                                                                            
 
                    |
|  0. Cough                                                                                 
                                                                                            
                    |
|   1. Pneumonia due to Pseudomonas                                                         
                                                                                            
                    |
|   2. End stage renal disease                                                              
                                                                                            
                    |
|   3. 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
onic kidney disease, or end stage renal disease                                             
                    |
|   4. Cachexia                                                                             
                                                                                            
                    |
|   5. Chronic obstructive pulmonary disease with acute lower respiratory infection         
                                                                                            
                    |
|   6. Type 2 diabetes mellitus with diabetic chronic kidney disease                        
                                                                                            
                    |
|   7. Heart failure, unspecified                                                           
                                                                                            
                    |
|   8. Hyperlipidemia, unspecified                                                          
                                                                                            
                    |
|   9. Gastro-esophageal reflux disease without esophagitis                                 
                                                                                            
                    |
|                                                                                           
                                                                                            
                    |
|2018 [facility] Aksah Cox. [place] Medical Surgical                                 
                                                                                            
                    |
|  0. Other chest pain                                                                      
                                                                                            
                    |
|   1. Gastro-esophageal reflux disease without esophagitis                                 
                                                                                            
                    |
|   2. End stage renal disease                                                              
                                                                                            
                    |
|   3. 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
onic kidney disease, or end stage renal disease                                             
                    |
|   4. Chronic systolic (congestive) heart failure                                          
                                                                                            
                    |
|   5. 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
ctoris                                                                                      
                    |
|   6. Type 2 diabetes mellitus with diabetic chronic kidney disease                        
                                                                                            
                    |
|   7. Hyperlipidemia, unspecified                                                          
                                                                                            
 
                    |
|   8. Major depressive disorder, single episode, unspecified                               
                                                                                            
                    |
|   9. Chronic obstructive pulmonary disease, unspecified                                   
                                                                                            
                    |
|                                                                                           
                                                                                            
                    |
|Mar 6, 2018 [facility] JANEEN Ramsay. [place] Cardiology                              
                                                                                            
                    |
|  Heart Failure                                                                            
                                                                                            
                    |
|   Need for iv access                                                                      
                                                                                            
                    |
|   Type 2 diabetes mellitus with other specified complication                              
                                                                                            
                    |
|   Long term (current) use of insulin                                                      
                                                                                            
                    |
|   Paroxysmal atrial fibrillation                                                          
                                                                                            
                    |
|   Anemia in chronic kidney disease                                                        
                                                                                            
                    |
|   Atherosclerotic heart disease of native coronary artery without angina pectoris         
                                                                                            
                    |
|   Cardiomyopathy, unspecified                                                             
                                                                                            
                    |
|   End stage renal disease                                                                 
                                                                                            
                    |
|   Other specified postprocedural states                                                   
                                                                                            
                    |
|                                                                                           
                                                                                            
                    |
|                                                                                           
                                                                                            
                    |
|                                                                                           
                                                                                            
                    |
|Care Providers                                                                             
                                                                                            
                    |
|Provider PRC Type Phone Fax Service Dates                                                  
                                                                                            
                    |
|JOHNS, ELZA HENDERSON Nurse Practitioner: Family   Current                                
                                                                                            
 
                    |
|[facility] /Care Coordinator [phone]  2019 - Current         
                                                                                            
                    |
|[facility] Primary [facility] [phone]  2019 - Current                          
                                                                                            
                    |
|[facility] Primary Care  [phone] Current                         
                                                                                            
                    |
|[facility] Primary Care   Current                                
                                                                                            
                    |
|[facility] Primary Care   Current                                                        
                                                                                            
                    |
|[employer] Mental Health Provider [phone] Current                 
                                                                                            
                    |
|[facility] Case or Care Manager   Current                                                   
                                                                                            
                    |
|MIRTA Goel Case or Care Manager [phone] Current
                                                                                            
                    |
|Jairo Gutierrez MD Other   Current                                                       
                                                                                            
                    |
|                                                                                           
                                                                                            
                    |
|[employer]                                                                          
                                                                                            
                    |
|This patient has registered at the [facility] Emergency Department     
                                                                                            
                    |
|For more information visit: [email or web address]
[email or web address]                                                                               
                    |
|The above information is provided for the sole purpose of patient treatment. Use of this in
formation beyond the terms of Data Sharing Memorandum of Understanding and License Agreement
 is prohibited. In  |
|certain cases not all visits may be represented. Consult the aforementioned facilities for 
additional information.                                                                     
                    |
|2019 [employer] - [place], [place] - [email or web address]                                                                                       
                    |
+-------------------------------------------------------------------------------------------
--------------------------------------------------------------------------------------------
--------------------+
 
 
 
+-------------------+---------+--------------------+--------------+
| Performing        | Address | City/State/Zipcode | Phone Number |
| Organization      |         |                    |              |
+-------------------+---------+--------------------+--------------+
|   ED INFORMATION  |         |                    |              |
 
| EXCHANGE          |         |                    |              |
+-------------------+---------+--------------------+--------------+
 in this encounter
 
 Visit Diagnoses
 Not on filein this encounter

## 2019-04-09 NOTE — XMS
Encounter Summary
  Created on: 2019
 
 Cherie Villalobos
 External Reference #: [id number]
 : 48
 Sex: Female
 
 Demographics
 
 
+-----------------------+--------------------------+
| Address               | [address]               |
|                       | [place]  [address] |
+-----------------------+--------------------------+
| Home Phone            | [phone]          |
+-----------------------+--------------------------+
| Preferred Language    | Unknown                  |
+-----------------------+--------------------------+
| Marital Status        |                   |
+-----------------------+--------------------------+
| Latter day Affiliation | 1013                     |
+-----------------------+--------------------------+
| Race                  | Unknown                  |
+-----------------------+--------------------------+
| Ethnic Group          | Unknown                  |
+-----------------------+--------------------------+
 
 
 Author
 
 
+--------------+-----------------------+
| Author       | Sherry Weddingful |
+--------------+-----------------------+
| Organization | [facility] [employer] |
+--------------+-----------------------+
| Address      | Unknown               |
+--------------+-----------------------+
| Phone        | Unavailable           |
+--------------+-----------------------+
 
 
 
 Support
 
 
+---------------+--------------+---------------------+-----------------+
| Name          | Relationship | Address             | Phone           |
+---------------+--------------+---------------------+-----------------+
| Anoop Villalobos | ECON         | [place], | [phone] |
|               |              |  [place]  [address]     |                 |
+---------------+--------------+---------------------+-----------------+
| Jennifer Dickson | ECON         | [place]       | [phone] |
|               |              | [id number]               |                 |
+---------------+--------------+---------------------+-----------------+
 
 
 
 
 Care Team Providers
 
 
+-----------------------+------+-----------------+
| Care Team Member Name | Role | Phone           |
+-----------------------+------+-----------------+
| Ivy Couch DO      | PCP  | [phone] |
+-----------------------+------+-----------------+
 
 
 
 Reason for Visit
 
 
+--------+---------------------------------------------------+
| Reason | Comments                                          |
+--------+---------------------------------------------------+
| Other  | Diagnosis request sent to [facility] |
+--------+---------------------------------------------------+
 
 
 
 Encounter Details
 
 
+--------+-------------+----------------------+---------------+-------------------+
| Date   | Type        | Department           | Care Team     | Description       |
+--------+-------------+----------------------+---------------+-------------------+
| 01/15/ | Documentati |   [facility]      |   Rl,  | Other (Diagnosis  |
| 2019   | on Only     | [place]  [address]    | [place] [address]  | request sent to   |
|        |             | [address]    |               | [facility]        |
|        |             | [place] [address]  |               | confirmation)     |
|        |             |  [phone]        |               |                   |
+--------+-------------+----------------------+---------------+-------------------+
 
 
 
 Social History
 
 
+---------------+------------+-----------+--------+------------------+
| Tobacco Use   | Types      | Packs/Day | Years  | Date             |
|               |            |           | Used   |                  |
+---------------+------------+-----------+--------+------------------+
| Former Smoker | Cigarettes | 1         | 30     | Quit: 2004 |
+---------------+------------+-----------+--------+------------------+
 
 
 
+---------------------+---+---+---+
| Smokeless Tobacco:  |   |   |   |
| Never Used          |   |   |   |
+---------------------+---+---+---+
 
 
 
+------------------------------+
| Comments: quite smoking  |
+------------------------------+
 
 
 
 
+-------------+-----------+---------+----------+
| Alcohol Use | Drinks/We | oz/Week | Comments |
|             | ek        |         |          |
+-------------+-----------+---------+----------+
| No          |           |         |          |
+-------------+-----------+---------+----------+
 
 
 
+------------------+---------------+
| Sex Assigned at  | Date Recorded |
| Birth            |               |
+------------------+---------------+
| Not on file      |               |
+------------------+---------------+
 as of this encounter
 
 Plan of Treatment
 
 
+--------+---------+-----------+----------------------+-------------+
| Date   | Type    | Specialty | Care Team            | Description |
+--------+---------+-----------+----------------------+-------------+
| 04/10/ | Office  | Podiatry  |   Srinivasan Jordan,  |             |
|    | Visit   |           | DPM  [address], |             |
|        |         |           |  [address]  [place],  |             |
|        |         |           | [place] [address]             |             |
|        |         |           | [phone]         |             |
|        |         |           | [phone] (Fax)   |             |
+--------+---------+-----------+----------------------+-------------+
 as of this encounter
 
 Visit Diagnoses
 Not on filein this encounter

## 2019-04-09 NOTE — XMS
Clinical Summary
  Created on: 2019
 
 Cherie Jo
 External Reference #: [id number]
 : 48
 Sex: Female
 
 Demographics
 
 
+-----------------------+--------------------------+
| Address               | [address]               |
|                       | [place]  [address] |
+-----------------------+--------------------------+
| Home Phone            | [phone]          |
+-----------------------+--------------------------+
| Preferred Language    | Unknown                  |
+-----------------------+--------------------------+
| Marital Status        |                   |
+-----------------------+--------------------------+
| [employer] Affiliation | 1013                     |
+-----------------------+--------------------------+
| Race                  | Unknown                  |
+-----------------------+--------------------------+
| Ethnic Group          | Unknown                  |
+-----------------------+--------------------------+
 
 
 Author
 
 
+--------------+-----------------------+
| Author       | [facility] |
+--------------+-----------------------+
| Organization | [facility] |
+--------------+-----------------------+
| Address      | Unknown               |
+--------------+-----------------------+
| Phone        | Unavailable           |
+--------------+-----------------------+
 
 
 
 Support
 
 
+---------------+--------------+---------------------+-----------------+
| Name          | Relationship | Address             | Phone           |
+---------------+--------------+---------------------+-----------------+
| Anoop Jo | ECON         | [place], | [phone] |
|               |              |  [place]  [address]     |                 |
+---------------+--------------+---------------------+-----------------+
| Jennifer Dickson | ECON         | [place]       | [phone] |
|               |              | [phone]               |                 |
+---------------+--------------+---------------------+-----------------+
 
 
 
 
 Care Team Providers
 
 
+-----------------------+------+-----------------+
| Care Team Member Name | Role | Phone           |
+-----------------------+------+-----------------+
| Ivy Couch DO      | PP   | [phone] |
+-----------------------+------+-----------------+
 
 
 
 Allergies
 
 
+---------------------+----------------------+----------+----------+----------------------+
| Active Allergy      | Reactions            | Severity | Noted    | Comments             |
|                     |                      |          | Date     |                      |
+---------------------+----------------------+----------+----------+----------------------+
| Digitoxin           | Other (See Comments) | High     | 20 |   Toxic, patient     |
|                     |                      |          | 18       | states her eyes were |
|                     |                      |          |          |  also affected "she  |
|                     |                      |          |          | seen yellow          |
|                     |                      |          |          | everywhere"          |
+---------------------+----------------------+----------+----------+----------------------+
| Digoxin And Related | Other (See Comments) | Medium   | 20 |   toxic              |
|                     |                      |          | 18       |                      |
+---------------------+----------------------+----------+----------+----------------------+
| Metaxalone          | Other (See Comments) | Medium   |          |                      |
+---------------------+----------------------+----------+----------+----------------------+
| Morphine            | Mental Changes,      | High     | 20 |   "makes me feel     |
|                     | Other (See           |          | 12       | jittery"  Other      |
|                     | Comments), Anxiety   |          |          | reaction(s): MAKES   |
|                     |                      |          |          | HER "CRAZY"          |
|                     |                      |          |          | Hallucinations  Make |
|                     |                      |          |          |  her crazy/ "funny   |
|                     |                      |          |          | feeling in my head"  |
|                     |                      |          |          |  Has used            |
|                     |                      |          |          | hydromorphone        |
|                     |                      |          |          | in-house             |
+---------------------+----------------------+----------+----------+----------------------+
| Pantoprazole Sodium | Nausea and Vomiting  | Medium   | 20 |                      |
|                     |                      |          | 18       |                      |
+---------------------+----------------------+----------+----------+----------------------+
| Penicillin G        | Hives                | High     | 20 |                      |
|                     |                      |          | 10       |                      |
+---------------------+----------------------+----------+----------+----------------------+
| Penicillins         | Rash, Hives          | High     | 20 |   Other reaction(s): |
|                     |                      |          | 11       |  RASH  Tolerates     |
|                     |                      |          |          | cephalosporins       |
+---------------------+----------------------+----------+----------+----------------------+
| Quinapril Hcl       | Edema                | Medium   | 20 |   Facial Edema       |
|                     |                      |          | 13       |                      |
+---------------------+----------------------+----------+----------+----------------------+
| Quinapril Hcl       | Anaphylaxis          | High     | 20 |                      |
|                     |                      |          | 18       |                      |
+---------------------+----------------------+----------+----------+----------------------+
| Quinapril Hcl       | Angioedema           | High     | 20 |   Facial Edema       |
|                     |                      |          | 13       |                      |
+---------------------+----------------------+----------+----------+----------------------+
 
| Pseudoephedrine Hcl | Rash                 | Medium   | 20 |                      |
|                     |                      |          | 11       |                      |
+---------------------+----------------------+----------+----------+----------------------+
 
 
 
 Current Medications
 
 
+----------------------+----------------------+--------+---------+------+------+-------+
| Prescription         | Sig.                 | Disp.  | Refills | Star | End  | Statu |
|                      |                      |        |         | t    | Date | s     |
|                      |                      |        |         | Date |      |       |
+----------------------+----------------------+--------+---------+------+------+-------+
|   rOPINIRole         | Take 0.75 mg by      |        |         |      |      | Activ |
| (REQUIP) 0.25 MG     | mouth nightly.       |        |         |      |      | e     |
| tablet               |                      |        |         |      |      |       |
+----------------------+----------------------+--------+---------+------+------+-------+
|   nortriptyline      | Take 25-75 mg by     |        |         |      |      | Activ |
| (PAMELOR) 25 MG      | mouth See Admin      |        |         |      |      | e     |
| capsule              | Instructions. Takes  |        |         |      |      |       |
|                      | 25 mg by mouth every |        |         |      |      |       |
|                      |  morning and 75 mg   |        |         |      |      |       |
|                      | every night          |        |         |      |      |       |
+----------------------+----------------------+--------+---------+------+------+-------+
|   oxybutynin         | Take 7.5 mg by mouth |        |         |      |      | Activ |
| (DITROPAN) 5 MG      |  2 (two) times       |        |         |      |      | e     |
| tablet               | daily.               |        |         |      |      |       |
+----------------------+----------------------+--------+---------+------+------+-------+
|   albuterol          | Inhale 2 puffs into  |        |         |      |      | Activ |
| (PROVENTIL           | the lungs every 4    |        |         |      |      | e     |
| HFA;VENTOLIN HFA)    | (four) hours as      |        |         |      |      |       |
| 108 (90 Base)        | needed for Wheezing. |        |         |      |      |       |
| MCG/ACT              |                      |        |         |      |      |       |
| inhalerIndications:  |                      |        |         |      |      |       |
| states uses 2x       |                      |        |         |      |      |       |
| monthly average      |                      |        |         |      |      |       |
+----------------------+----------------------+--------+---------+------+------+-------+
|   insulin aspart     | Inject  into the     |        |         |      |      | Activ |
| (NOVOLOG) 100        | skin 3 (three) times |        |         |      |      | e     |
| UNIT/ML injection    |  daily before meals. |        |         |      |      |       |
|                      |  Sliding scale       |        |         |      |      |       |
+----------------------+----------------------+--------+---------+------+------+-------+
|   apixaban (ELIQUIS) | Take 1 tablet by     |   60   | 0       | 12/0 |      | Activ |
|  2.5 MG tablet       | mouth 2 (two) times  | tablet |         | 3/20 |      | e     |
|                      | daily.               |        |         | 18   |      |       |
+----------------------+----------------------+--------+---------+------+------+-------+
|   isosorbide         | Take 1 tablet by     |   30   | 1       | 12/0 | 12/0 | Activ |
| mononitrate (IMDUR)  | mouth daily.         | tablet |         | 5/20 | 5/20 | e     |
| 60 MG 24 hr tablet   |                      |        |         | 18   | 19   |       |
+----------------------+----------------------+--------+---------+------+------+-------+
|   ondansetron        | Take 4 mg by mouth   |        |         | 09/ |      | Activ |
| (ZOFRAN-ODT) 4 MG    | every 8 (eight)      |        |         | / |      | e     |
| disintegrating       | hours as needed.     |        |         | 18   |      |       |
| tablet               |                      |        |         |      |      |       |
+----------------------+----------------------+--------+---------+------+------+-------+
|   insulin degludec   | Inject 21 units      |        |         |      |      | Activ |
| (TRESIBA) 100        | every night          |        |         |      |      | e     |
| UNIT/ML injection    |                      |        |         |      |      |       |
+----------------------+----------------------+--------+---------+------+------+-------+
 
|   esomeprazole       | Take 1 capsule by    |        |         |      |      | Activ |
| (NEXIUM) 40 MG       | mouth every day      |        |         |      |      | e     |
| capsule              |                      |        |         |      |      |       |
+----------------------+----------------------+--------+---------+------+------+-------+
|                      | Take 1 tablet by     |   30   | 0       | 01/0 |      | Activ |
| HYDROcodone-acetamin | mouth every 4 (four) | tablet |         | 4/20 |      | e     |
| ophen (NORCO) 5-325  |  hours as needed.    |        |         | 19   |      |       |
| MG per tablet        |                      |        |         |      |      |       |
+----------------------+----------------------+--------+---------+------+------+-------+
|   carvedilol (COREG) | Take 1 tablet by     |   60   | 0       | 01/1 | 01/1 | Activ |
|  12.5 MG tablet      | mouth 2 (two) times  | tablet |         | /20 | 7/20 | e     |
|                      | daily with meals.    |        |         | 19   | 20   |       |
+----------------------+----------------------+--------+---------+------+------+-------+
|   nitroGLYCERIN      | Place 1 tablet under |   30   | 0       |  |  | Activ |
| (NITROSTAT) 0.4 MG   |  the tongue every 5  | tablet |         | /20 | 7 | e     |
| SL tablet            | (five) minutes as    |        |         | 19   | 20   |       |
|                      | needed for Chest     |        |         |      |      |       |
|                      | pain.                |        |         |      |      |       |
+----------------------+----------------------+--------+---------+------+------+-------+
|   prochlorperazine 5 | TAKE ONE TABLET BY   |   60   | 11      |  |      | Activ |
|  MG tablet           | MOUTH TWICE DAILY AS | tablet |         |  |      | e     |
|                      |  NEEDED FOR NAUSEA   |        |         | 19   |      |       |
+----------------------+----------------------+--------+---------+------+------+-------+
|   losartan (COZAAR)  | Take 100 mg by mouth |        |         | /2 |      | Activ |
| 100 MG tablet        |  daily.              |        |         | 020 |      | e     |
|                      |                      |        |         | 19   |      |       |
+----------------------+----------------------+--------+---------+------+------+-------+
|   TRINTELLIX 20 MG   | Take 20 mg by mouth  |        |         | /2 |      | Activ |
| TABS                 | every evening.       |        |         | 020 |      | e     |
|                      |                      |        |         | 19   |      |       |
+----------------------+----------------------+--------+---------+------+------+-------+
|   atorvastatin       | Take 80 mg by mouth  |        |         | 02/0 |      | Activ |
| (LIPITOR) 80 MG      | nightly.             |        |         | 3/20 |      | e     |
| tablet               |                      |        |         | 19   |      |       |
+----------------------+----------------------+--------+---------+------+------+-------+
|   LORazepam (ATIVAN) | Take 0.5 tablets by  |        |         | 02/1 |      | Activ |
|  1 MG tablet         | mouth every 8        |        |         | 5/20 |      | e     |
|                      | (eight) hours as     |        |         | 19   |      |       |
|                      | needed for Anxiety.  |        |         |      |      |       |
|                      | Patient states she   |        |         |      |      |       |
|                      | has the 1mg tablets  |        |         |      |      |       |
|                      | at home and that     |        |         |      |      |       |
|                      | they are scored and  |        |         |      |      |       |
|                      | she will split them  |        |         |      |      |       |
|                      | in half              |        |         |      |      |       |
+----------------------+----------------------+--------+---------+------+------+-------+
|   glucose blood test | Contour Next Test    |        |         |      |      | Activ |
|  strip               | Strips USE 1 STRIP   |        |         |      |      | e     |
|                      | TO CHECK GLUCOSE     |        |         |      |      |       |
|                      | THREE TIMES DAILY    |        |         |      |      |       |
|                      | FOR 30 DAYS          |        |         |      |      |       |
+----------------------+----------------------+--------+---------+------+------+-------+
|   LORazepam (ATIVAN) |                      |        |         | 02/2 |      | Activ |
|  0.5 MG tablet       |                      |        |         | 1/20 |      | e     |
|                      |                      |        |         | 19   |      |       |
+----------------------+----------------------+--------+---------+------+------+-------+
|   traMADol (ULTRAM)  | Take 1 tablet by     |   30   | 0       | 02/2 |      | Activ |
| 50 MG tablet         | mouth every 6 (six)  | tablet |         | 8/20 |      | e     |
|                      | hours as needed for  |        |         | 19   |      |       |
|                      | Pain.                |        |         |      |      |       |
 
+----------------------+----------------------+--------+---------+------+------+-------+
|   mupirocin          | Apply  topically     |   22 g | 0       | 03/0 | 03/1 | Expir |
| (BACTROBAN) 2 %      | daily for 7 days.    |        |         | 6/20 | 3/20 | ed    |
| ointment             |                      |        |         | 19   | 19   |       |
+----------------------+----------------------+--------+---------+------+------+-------+
 
 
 
 Active Problems
 
 
+-----------------------------------------------------+------------+
| Problem                                             | Noted Date |
+-----------------------------------------------------+------------+
| Macrocytosis                                        | 2019 |
+-----------------------------------------------------+------------+
| Lactic acidosis                                     | 2019 |
+-----------------------------------------------------+------------+
| Falls frequently                                    | 2019 |
+-----------------------------------------------------+------------+
| Restless legs syndrome                              | 2019 |
+-----------------------------------------------------+------------+
| Overactive bladder                                  | 2019 |
+-----------------------------------------------------+------------+
| CAD (coronary artery disease)                       | 2018 |
+-----------------------------------------------------+------------+
| Chronic multifocal osteomyelitis of left foot (HCC) | 2018 |
+-----------------------------------------------------+------------+
| PVD (peripheral vascular disease) ([facility])             | 2018 |
+-----------------------------------------------------+------------+
| COPD (chronic obstructive pulmonary disease) ([facility])  | 2018 |
+-----------------------------------------------------+------------+
| Mixed hyperlipidemia                                | 2018 |
+-----------------------------------------------------+------------+
 
 
 
+---------------------------------------------------------------+
|   Overview:   Last Assessment & Plan:                         |
| Moderate dose statin and don't titrate due to ESRD and ASHD.  |
| - Continue Atorvastatin 20mg                                  |
+---------------------------------------------------------------+
 
 
 
+-------------------------------------------------------+------------+
| ICD (implantable cardioverter-defibrillator) in place | 2018 |
+-------------------------------------------------------+------------+
 
 
 
+-------------------------------------------------------------------+
|   Overview:   Overview: Formatting of this note may be different  |
| from the original. MODEL NAME MODEL# SERIAL# DATE IMPLANTED       |
| GENERATOR [employer] EL ICD [id number] [id number] |
|  RV LEAD [place] [employer] [place] [address] [place] [id number]  |
| Indication: Nonischemic dilated cardiopathy with persistent       |
| severely depressed left ventricular systolic function, LVEF of    |
| LVEF of 30%                                                       |
+-------------------------------------------------------------------+
 
 
 
 
+-----------------------------------------------+------------+
| Implantable defibrillator reprogramming/check | 2018 |
+-----------------------------------------------+------------+
| Pleural effusion                              | 2018 |
+-----------------------------------------------+------------+
 
 
 
+-------------------------------------------------------------------+
|   Overview:   Last Assessment & Plan: Post MV repair and CABG x2  |
| 2018 and recurrent pleural effusions and elevated ESR.       |
| Plan:Received 1 time dose of Colchicine Started on Prednisone per |
|  surgery CXR today shows stable bilateral pleural effusions       |
|Received 1 time dose of Colchicine                                 |
|Started on Prednisone per surgery                                  |
|CXR today shows stable bilateral pleural effusions                 |
+-------------------------------------------------------------------+
 
 
 
+-------------------------------------------------+------------+
| Prolonged Q-T interval on ECG                   | 2018 |
+-------------------------------------------------+------------+
| Chronic systolic congestive heart failure (HCC) | 2018 |
+-------------------------------------------------+------------+
 
 
 
+-------------------------------------------------------------------+
|   Overview:   Overview: Echocardiogram 2018 shows mild       |
| biatrial dilatation, mild left ventricular dilatation with a mild |
|  eccentric left ventricular hypertrophy, there is a moderate      |
| global hypokinesis of left ventricle, lvef is 30-35%, normal      |
| right ventricular size and wall thickness with a mildly reduced   |
| right ventricular systolic function, mildly thickened and         |
| calcified trileaflet aortic valve with adequate opening, there is |
|  a mild aortic valve insufficiency, mildly thickened and          |
| calcified mitral valve suggesting myxomatous change with evidence |
|  of mitral valve repair, there is at least moderate central       |
| mitral valve regurgitation, mild tricuspid valve regurgitation,   |
| mild to moderate pulmonary hypertension with a peak systolic      |
| pressure of 50-55 mmhg, normal ivc with normal respiratory        |
| collapse, when compared to echocardiography on 3/7/18, left       |
| ventricular systolicsystolic function is slightly                 |
| improved.Echocardiogram 2018 show overall left ventricular    |
| systolic function is severely impaired with, an EF between 20 -   |
| 25 %, there is mild aortic regurgitation, there is mild aortic    |
| stenosis present, mild-to-moderate tricuspid regurgitation        |
| present, there is mild pulmonary hypertension, pleural effusion   |
| present. Duglas Ornelas MD, [facility]; [facility] Last Assessment & Plan: EF |
|  25% s/p CABG on 18 but now down to 15%. Plan:Continue Coreg |
|  and Losartan Hemodialysis for fluid removal.Strict I/O and daily |
|  weights.                                                         |
+-------------------------------------------------------------------+
 
 
 
 
+------------------+------------+
| Chronic diarrhea | 2018 |
+------------------+------------+
 
 
 
+---------------------------------------------+
|   Overview:   Colitis as working diagnosis. |
+---------------------------------------------+
 
 
 
+---------------------------------------------+------------+
| Chronic anticoagulation                     | 2018 |
+---------------------------------------------+------------+
| Plantar ulcer ([facility])                         | 2018 |
+---------------------------------------------+------------+
| Steroid-induced hyperglycemia               | 2018 |
+---------------------------------------------+------------+
| Moderate protein-calorie malnutrition (HCC) | 2018 |
+---------------------------------------------+------------+
| Stress hyperglycemia                        | 2018 |
+---------------------------------------------+------------+
| Cardiomyopathy (HCC)                        | 2018 |
+---------------------------------------------+------------+
 
 
 
+-------------------------------------------------------------------+
|   Overview:   Overview: Ischemic (3 V CAD) and non ischemic       |
| (Hypertension and MR and DM and ESRD). Tolerated metoprolol,      |
| losartan but avoid spironolactone due to ESRD. Last Assessment &  |
| Plan: Severe ischemic cardiomyopathy (EF 15%) and ESRD on HD who  |
| presented with dyspnea related to bilateral pleural effusions and |
|  episodic AF with RVR now s/p thoracentesis with significant      |
| improvement in her symptoms and in NSR. Plan:Volume removal with  |
| dialysis as toleratedLasix 80 mg twice a day Coreg and low dose   |
| losartan. Up titrate as tolerated but working on fluid removal    |
| and maintaining adequate BP. Strict I/O with daily weights.       |
|Strict I/O with daily weights.                                     |
+-------------------------------------------------------------------+
 
 
 
+--------------------------------------+------------+
| Paroxysmal atrial fibrillation (HCC) | 2018 |
+--------------------------------------+------------+
 
 
 
+-------------------------------------------------------------------------------------------
--------------------------------------------------------+
|   Overview:   Overview: PAF with dialysis in the past and                                 
                                                        |
| recurrent post op MV/CABG surgery. Last Assessment & Plan:                                
                                                        |
| Remains in NSRNormal atrial size by echocardiogram. Plan:Continue                         
                                                        |
|  Coreg with up-titration as toleratedAmiodarone 400 mg BID while                          
                                                        |
 
| inpatient and then transition to 200 mg BID for 2 weeks and then                          
                                                        |
| 200 mg daily. Continue po 1-3 months post discharge. Continue                             
                                                        |
| warfarin for CVA prophylaxis, INR 2.3 today                                               
                                                        |
|Plan:                                                                                      
                                                       |
|Continue Coreg with up-titration as tolerated                                              
                                                        |
|Amiodarone 400 mg BID while inpatient and then transition to 200 mg BID for 2 weeks and the
n 200 mg daily. Continue po 1-3 months post discharge.  |
|Continue warfarin for CVA prophylaxis, INR 2.3 today                                       
                                                        |
+-------------------------------------------------------------------------------------------
--------------------------------------------------------+
 
 
 
+--------------+------------+
| S/P CABG x 2 | 2018 |
+--------------+------------+
 
 
 
+-------------------------------------------------------------------------------------------
-----------+
|   Overview:   Overview: SVG's to OM and RCA at time of MV ring                            
           |
| Cristopher #28. POLY left.Last Assessment & Plan: 18: Mitral                            
           |
| valve repair with a 28 Cristopher annuloplasty ring; CABG x2 (SVG                           
           |
| to OM and SVG to RCA)Plan:ASA, statin, BB and ARB                                         
           |
|18: Mitral valve repair with a 28 Cristopher annuloplasty ring; CABG x2 (SVG to OM and S
VG to RCA) |
|                                                                                           
           |
|Plan:                                                                                      
          |
|ASA, statin, BB and ARB                                                                    
           |
+-------------------------------------------------------------------------------------------
-----------+
 
 
 
+-------------------------+------------+
| S/P mitral valve repair | 2018 |
+-------------------------+------------+
 
 
 
+----------------------------------------------------------------------------------------+
|   Overview:   Overview: Cristopher ring 2.16.18 ([place]) for severe                       |
| MR. POLY left. CABG too, SVG x 2 to OM and RCA.Last Assessment &                        |
| Plan: Cristopher ring 2.16.18 ([place]) for severe MR. POLY left. CABG                      |
|  too, SVG x 2 to OM and RCA.                                                           |
|Cristopher ring 2.16.18 ([place]) for severe MR. POLY left. CABG too, SVG x 2 to OM and RCA. |
 
+----------------------------------------------------------------------------------------+
 
 
 
+----------------------------------+------------+
| Osteomyelitis of left foot (HCC) | 2017 |
+----------------------------------+------------+
 
 
 
+-------------------------------------------------------------------+
|   Overview:   Added automatically from request for surgery [id number] |
+-------------------------------------------------------------------+
 
 
 
+-----------------------------------------------------+------------+
| Foot ulcer due to DM  (HCC)                         | 2017 |
+-----------------------------------------------------+------------+
| Severe protein-calorie malnutrition (HCC)           | 2017 |
+-----------------------------------------------------+------------+
| Loss of weight                                      | 2017 |
+-----------------------------------------------------+------------+
| Dysphagia, unspecified                              | 2017 |
+-----------------------------------------------------+------------+
| Diabetic foot ulcer (HCC)                           | 2017 |
+-----------------------------------------------------+------------+
| Dyslipidemia                                        | 2017 |
+-----------------------------------------------------+------------+
| Gastroesophageal reflux disease without esophagitis | 2017 |
+-----------------------------------------------------+------------+
| Hypertension, essential                             | 2016 |
+-----------------------------------------------------+------------+
 
 
 
+----------------------------------------------------+
|   Overview:   Last Assessment & Plan:              |
| Will resume metoprolol and losartan as BP allows.  |
+----------------------------------------------------+
 
 
 
+--------------------------------------+------------+
| ESRD (end stage renal disease) ([facility]) | 2016 |
+--------------------------------------+------------+
 
 
 
+-------------------------------------------------------------------+
|   Overview:   Primary nephrologist is Dr. Asher. She was on PD    |
| and eventually converted to HD and is dialyzed TTS using left UE  |
| AVF at [facility].                                          |
+-------------------------------------------------------------------+
 
 
 
+------------------------------------------------------------------+------------+
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
| dialysis, with long-term current use of insulin ([facility])            |            |
 
+------------------------------------------------------------------+------------+
| Anemia in ESRD (end-stage renal disease) ([facility])                   | 2016 |
+------------------------------------------------------------------+------------+
| Proteinuria                                                      | 2011 |
+------------------------------------------------------------------+------------+
| Vitamin D deficiency                                             | 2011 |
+------------------------------------------------------------------+------------+
| Secondary hyperparathyroidism ([facility])                              | 2011 |
+------------------------------------------------------------------+------------+
| Recurrent UTI                                                    | 2011 |
+------------------------------------------------------------------+------------+
| Coronary artery disease involving native coronary artery of      | 2011 |
| native heart without angina pectoris                             |            |
+------------------------------------------------------------------+------------+
 
 
 
+-------------------------------------------------------------------+
|   Overview:   Overview: [facility] on 2012 shows totally occluded   |
| right coronary artery with collateral filling from the left, no   |
| significant stenoses within the left anterior descending artery   |
| and circumflex artery. Patent stents within the left anterior     |
| descending artery, normal intracardiac pressure, mild narrowing   |
| within the distal aorta and iliac arteries. - Sherie Bartholomew,   |
| MDEchocardiogram on 2017 shows Study: a 2-dimensional        |
| transthoracic echocardiogram with m-mode, spectral and color flow |
|  Doppler was performed, left ventricular systolic function is     |
| low-normal with, an EF between 50 - 55 %, the left ventricle      |
| cavity size is normal, the RV is normal in size and function, the |
|  left atrium is normal in size, the right atrium is normal in     |
| size, aortic valve is trileaflet and is mildly thickened, there   |
| is mild aortic regurgitation, there is no evidence of aortic      |
| stenosis, the mitral valve is normal. - OLIVA Huffman |
|  on 2017 shows severe triple-vessel coronary artery disease   |
| with moderate ostial left anterior descending artery and patent   |
| stent in the midsegment, moderate stenosis in the second obtuse   |
| marginal branch and total occlusion of the proximal right         |
| coronary artery, which is known from before, given the patient's  |
| symptoms at this time, recommendation given. The patient          |
| understands. We will proceed with further medical management with |
|  blood pressure control. Also, we will optimize our blood         |
| pressure management. Further evaluation for the ostial left       |
| anterior descending artery and circumflex lesion upon recurrent   |
| symptoms for the patient, the patient will be evaluated for any   |
| further symptoms and fractional flow reserve of the left anterior |
|  descending artery and possible intervention on the left          |
| circumflex system will be considered. - OLIVA Diane   |
| on 2017 shows severe 3-vessel coronary artery disease with   |
| moderate to severe proximal left anterior descending with         |
| significant fractional flow reserve value of 0.77, severe mid     |
| large marginal branch, and chronically occluded right coronary    |
| artery with left-to-right collaterals, normal left ventricular    |
| end-diastolic pressure. - OLIVA Lambert on 2017     |
| shows three-vessel coronary artery disease with a chronically     |
| occluded right coronary artery with left-to-right collaterals,    |
| severe proximal left anterior descending artery with a patent     |
| stent in the mid left anterior descending artery and a            |
| significant FFR value of 0.77 during diagnostic angiogram on      |
| 2017, and severe disease of the second marginal branch |
|  of the left circumflex artery, successful PTCA and stenting of   |
 
| the second marginal branch of the left circumflex artery using a  |
| 2.25 x 16 and a 2.25 x 8 mm overlapping synergy drug-eluting      |
| stents postdilated using a 2.25 noncompliant balloon, successful  |
| direct stenting of the proximal and ostium of the left anterior   |
| descending artery using a 3.5 x 16 mm Synergy drug-eluting stent  |
| postdilated using a 3.5 noncompliant balloon. - Abdelazim Hashim, |
|  MDEchocardiogram on 2018 shows the left ventricle is normal |
|  in size, wall thickness and moderately impaired systolic         |
| function EF 35-40%. Inferior, inferolateral and anterolateral     |
| hypokinesis, the right ventricle is normal in size and function,  |
| severe mitral regurgitation with moderately dilated left atrium,  |
| mild tricuspid regurgitation and mild pulmonary hypertension RVSP |
|  48 mmHg, there is no pericardial effusion, new wall motion       |
| abnormalities and new severe mitral regurgitation compared to     |
| prior study. - Celestino Porras, MDLHC on 2018 shows         |
| three-vessel coronary artery disease with widely patent stent in  |
| the left anterior descending artery and severe stent restenosis   |
| in the marginal branch, occluded right coronary artery with       |
| collateral from left to right, severe left ventricular            |
| dysfunction with severe mitral regurgitation, status post         |
| percutaneous transluminal coronary angioplasty of in-stent        |
| restenosis within the marginal branch, recommend consideration    |
| for mitral valve replacement and 2-vessel coronary artery bypass  |
| surgery to the right coronary artery and marginal branch. - Iyad  |
| MD DustinTEE and CABG x2 on 2018 shows Severely reduced LV  |
| systolic function. Visually estimated LVEF 25%, normal LV size    |
| and shape. Normal wall thickness, normal RV size with normal      |
| function, LA enlarged. POLY normal size without SEC, masses, or    |
| thrombi. IAS intact, no PFO detected, mitral valve with bileaflet |
|  thickening and severely restricted motion, associated with       |
| ventricular tethering. Severe functional MR with central jet,     |
| trileaflet aortic valve with mild sclerosis. No significant       |
| aortic stenosis. Mild AI with central jet, tricuspid valve normal |
|  structure and function. Trace TR, pulmonic valve normal Trace    |
| CT, visible portions of ascending aorta and arch normal. Grade II |
|  atherosclerotic disease of descending aorta, pulmonary artery    |
| normal, normal pericardium. No pericardial or pleural effusions,  |
| left ventricular function slightly improved and right ventricular |
|  function unchanged compared to preop,  28 mm mitral valve        |
| annuloplasty ring is well-seated with an acceptable inflow        |
| gradient across the mitral valve and no MR noted, no change in    |
| the aortic, tricuspid, or pulmonic valves compared to preop, no   |
| change in visible portions of aorta after decannulation, LV and   |
| RV function unchanged after sternal closure. - Jagjit Hickey,       |
| MDEchocardiogram on 3/7/2018 shows a complete two-dimensional     |
| transthoracic echocardiogram was performed (2D, M-mode, Doppler   |
| and color flow Doppler), left ventricular systolic function is    |
| severely reduced, the visually estimated LV ejection fraction is  |
| 15%, the left and right atrial chambers are both normal in size,  |
| there is mild mitral regurgitation, an annuloplasty ring is noted |
|  in the mitral position, there is mild tricuspid regurgitation,   |
| Estimated PA pressure is 42 mmHg, the aortic valve leaflets       |
| appear mildly calcified, the aortic valve opens well, the aortic  |
| valve mean systolic gradient was measured at 9 mm Hg. - Star    |
| Missy Pino Assessment & Plan: GEORGI in [facility] 5 months     |
| ago. 1 week off Plavix for CAGG/MVr surgery 2.16. Now and in the  |
| past with [facility] statin - data in dialysis patients for primary  |
| prevention with statin is not beneficial but this is secondary    |
| prevention. However, instead of high dose statin, moderate dose   |
| due to ESRD with slight increased risk of rhabdo. - Warfarin -    |
 
| ASA - Moderate dose Statin                                        |
+-------------------------------------------------------------------+
 
 
 
+------------+------------+
| Depression | 2011 |
+------------+------------+
 
 
 
 Resolved Problems
 
 
+----------------------------------------------------------------+----------+-----------+
| Problem                                                        | Noted    | Resolved  |
|                                                                | Date     | Date      |
+----------------------------------------------------------------+----------+-----------+
| Chest pain                                                     | 20 |  |
|                                                                | 19       | 9         |
+----------------------------------------------------------------+----------+-----------+
| Chest pain                                                     | 20 |  |
|                                                                | 18       | 8         |
+----------------------------------------------------------------+----------+-----------+
| Severe mitral regurgitation                                    | 20 |  |
|                                                                | 18       | 8         |
+----------------------------------------------------------------+----------+-----------+
| Precordial pain                                                | 20 |  |
|                                                                | 18       | 8         |
+----------------------------------------------------------------+----------+-----------+
| NSTEMI (non-ST elevated myocardial infarction) (HCC)           | 20 |  |
|                                                                | 17       | 8         |
+----------------------------------------------------------------+----------+-----------+
| Nausea and vomiting                                            | 20 |  |
|                                                                | 17       | 7         |
+----------------------------------------------------------------+----------+-----------+
| Abdominal pain, left upper quadrant                            | 20 |  |
|                                                                | 17       | 7         |
+----------------------------------------------------------------+----------+-----------+
| Partial small bowel obstruction (HCC)                          | 20 |  |
|                                                                | 17       | 7         |
+----------------------------------------------------------------+----------+-----------+
| Gastroenteritis                                                | 20 |  |
|                                                                | 17       | 7         |
+----------------------------------------------------------------+----------+-----------+
| Transient alteration of awareness                              | 20 |  |
|                                                                | 17       | 7         |
+----------------------------------------------------------------+----------+-----------+
| Pain of left hip joint                                         | 20 |  |
|                                                                | 17       | 7         |
+----------------------------------------------------------------+----------+-----------+
| Prolonged Q-T interval on ECG                                  | 20 |  |
|                                                                | 17       | 7         |
+----------------------------------------------------------------+----------+-----------+
| Hyperphosphatemia                                              | 20 |  |
|                                                                | 16       | 7         |
+----------------------------------------------------------------+----------+-----------+
| Fracture of left hip due to osteoporosis (HCC)                 | 20 |  |
|                                                                | 16       | 7         |
+----------------------------------------------------------------+----------+-----------+
 
| Closed fracture of base of fifth metatarsal bone of right foot | 20 |  |
|                                                                | 16       | 7         |
+----------------------------------------------------------------+----------+-----------+
| ESRD on peritoneal dialysis                                    | 20 |  |
|                                                                | 16       | 7         |
+----------------------------------------------------------------+----------+-----------+
| Acute abdominal pain                                           | 20 |  |
|                                                                | 16       | 7         |
+----------------------------------------------------------------+----------+-----------+
| Blood per rectum                                               | 20 |  |
|                                                                | 16       | 7         |
+----------------------------------------------------------------+----------+-----------+
| CKD (chronic kidney disease), stage V                          | 20 |  |
|                                                                | 11       | 6         |
+----------------------------------------------------------------+----------+-----------+
| Chronic diarrhea                                               | 20 |  |
|                                                                | 11       | 7         |
+----------------------------------------------------------------+----------+-----------+
 
 
 
+-------------------------+
|   Overview:   Resolved. |
+-------------------------+
 
 
 
+--------------------+----------+-----------+
| Stool incontinence | 20 |  |
|                    | 11       | 8         |
+--------------------+----------+-----------+
 
 
 
+-------------------------+
|   Overview:   Resolved. |
+-------------------------+
 
 
 
 Encounters
 
 
+--------+-------------+-----------+----------------------+----------------------+
| Date   | Type        | Specialty | Care Team            | Description          |
+--------+-------------+-----------+----------------------+----------------------+
| / | Telephone   |           |   Srinivasan Abdi,  | Other (Requesting    |
|    |             |           | DPM                  | status on form for   |
|        |             |           |                      | shoe order)          |
+--------+-------------+-----------+----------------------+----------------------+
| / | Documentati |           |   João Asher MD  | Other (February      |
| 2019   | on Only     |           |                      | [facility] |
|        |             |           |                      |  Dialysis Rounding   |
|        |             |           |                      | Note)                |
+--------+-------------+-----------+----------------------+----------------------+
| / | Office      |           |   Srinivasan Abdi L,  | Closed nondisplaced  |
|    | Visit       |           | DPM                  | fracture of distal   |
|        |             |           |                      | phalanx of right     |
|        |             |           |                      | great toe with       |
|        |             |           |                      | routine healing,     |
 
|        |             |           |                      | subsequent encounter |
|        |             |           |                      |  (Primary Dx);       |
|        |             |           |                      | Post-operative       |
|        |             |           |                      | state; Open wound of |
|        |             |           |                      |  toe, subsequent     |
|        |             |           |                      | encounter            |
+--------+-------------+-----------+----------------------+----------------------+
| / | Documentati |           |   Peabody, Jessica   | [facility]        |
| 2019   | on Only     |           | M, RN                | medical records      |
|        |             |           |                      | request)             |
+--------+-------------+-----------+----------------------+----------------------+
| / | Office      |           |   Kirt Mclaughlin MD     | Steal syndrome as    |
| 2019   | Visit       |           |                      | complication of      |
|        |             |           |                      | dialysis access,     |
|        |             |           |                      | sequela (Primary     |
|        |             |           |                      | Dx); ESRD (end stage |
|        |             |           |                      |  renal disease)      |
|        |             |           |                      | ([facility]); PAD           |
|        |             |           |                      | (peripheral artery   |
|        |             |           |                      | disease) ([facility])       |
+--------+-------------+-----------+----------------------+----------------------+
| / | Orders Only |           |   Dionne Danielson MA  |                      |
| 2019   |             |           |                      |                      |
+--------+-------------+-----------+----------------------+----------------------+
| / | Office      |           |   Srinivasan Abdi,  | Closed nondisplaced  |
|    | Visit       |           | DPM                  | fracture of distal   |
|        |             |           |                      | phalanx of right     |
|        |             |           |                      | great toe with       |
|        |             |           |                      | routine healing,     |
|        |             |           |                      | subsequent encounter |
|        |             |           |                      |  (Primary Dx);       |
|        |             |           |                      | Post-operative       |
|        |             |           |                      | state; Toe pain,     |
|        |             |           |                      | right                |
+--------+-------------+-----------+----------------------+----------------------+
| 02/15/ | Documentati |           |   João Asher MD  | Other (   | on Only     |           |                      | 2019[facility] |
|        |             |           |                      |  Dialysis Rounding   |
|        |             |           |                      | Note)                |
+--------+-------------+-----------+----------------------+----------------------+
| / | Emergency   |           |   Cookson, Rachel M, | Fall in home,        |
| 2019 - |             |           |  DO  Dorota,       | initial encounter    |
|        |             |           | MD Ginny            | (Primary Dx);        |
| 02/15/ |             |           | Baltazar Isidro,   | Cellulitis of right  |
|    |             |           | MD                   | toe; Closed          |
|        |             |           |                      | displaced fracture   |
|        |             |           |                      | of distal phalanx of |
|        |             |           |                      |  right great toe,    |
|        |             |           |                      | initial encounter;   |
|        |             |           |                      | Generalized          |
|        |             |           |                      | weakness; Fatigue,   |
|        |             |           |                      | unspecified type     |
+--------+-------------+-----------+----------------------+----------------------+
 
 
 
+---+-------------+
|   |   Discharge |
|   |  Summaries  |
|   | -           |
 
|   | Sanna, |
|   |  MD Baltazar  |
|   | -           |
|   | 02/15/2019  |
|   |  2:09 PM    |
|   | PST         |
|   | Formatting  |
|   | of this     |
|   | note may be |
|   |  different  |
|   | from the    |
|   | original.Pa |
|   | tient:      |
|   | Cherie       |
|   | Cinthya     |
|   | Wilfredo      |
|   |     :    |
|   | 1950    |
|   |   MRN:      |
|   | [id number] |
|   | te of       |
|   | Admission:  |
|   |  2019  |
|   |  Date of    |
|   | Discharge:  |
|   |  2/15/2019  |
|   |   Treatment |
|   |  Team:      |
|   | Consulting  |
|   | Physician:  |
|   | Srinivasan APONTE     |
|   | Julian,    |
|   | DPMConsulti |
|   | ng          |
|   | Physician:  |
|   | Andrés Elliott, |
|   |             |
|   | MDConsultin |
|   | g           |
|   | Physician:  |
|   | João H      |
|   | Akoum,      |
|   | MDAdmitting |
|   |  Provider:  |
|   | Tuhin       |
|   | Dorota,   |
|   | MDDischargi |
|   | ng          |
|   | Provider:   |
|   | BALTAZAR       |
|   | BEISWENGER, |
|   |             |
|   | MDDischarge |
|   |  Diagnoses: |
|   |  Principal  |
|   | Problem:    |
|   | Falls       |
|   | frequentlyA |
|   | ctive       |
|   | Problems:   |
 
|   | Depression  |
|   |  ESRD (end  |
|   | stage renal |
|   |  disease)   |
|   | (HCC)  Type |
|   |  2 diabetes |
|   |  mellitus   |
|   | with        |
|   | chronic     |
|   | kidney      |
|   | disease on  |
|   | chronic     |
|   | dialysis,   |
|   | with        |
|   | long-term   |
|   | current use |
|   |  of insulin |
|   |  (HCC)      |
|   | Anemia in   |
|   | ESRD        |
|   | (end-stage  |
|   | renal       |
|   | disease)    |
|   | (HCC)       |
|   | Hypertensio |
|   | n,          |
|   | essential   |
|   | Dyslipidemi |
|   | a           |
|   | Gastroesoph |
|   | ageal       |
|   | reflux      |
|   | disease     |
|   | without     |
|   | esophagitis |
|   |   Loss of   |
|   | weight      |
|   | Severe      |
|   | protein-karen |
|   | orie        |
|   | malnutritio |
|   | n (HCC)     |
|   | Chronic     |
|   | systolic    |
|   | congestive  |
|   | heart       |
|   | failure     |
|   | (HCC)  COPD |
|   |  (chronic   |
|   | obstructive |
|   |  pulmonary  |
|   | disease)    |
|   | (HCC)  ICD  |
|   | (implantabl |
|   | e           |
|   | cardioverte |
|   | r-defibrill |
|   | ator) in    |
|   | place       |
|   | Paroxysmal  |
 
|   | atrial      |
|   | fibrillatio |
|   | n (HCC)     |
|   | S/P CABG x  |
|   | 2  S/P      |
|   | mitral      |
|   | valve       |
|   | repair  PVD |
|   |             |
|   | (peripheral |
|   |  vascular   |
|   | disease)    |
|   | (HCC)  CAD  |
|   | (coronary   |
|   | artery      |
|   | disease)    |
|   | Macrocytosi |
|   | s  Lactic   |
|   | acidosis    |
|   | Restless    |
|   | legs        |
|   | syndrome    |
|   | Overactive  |
|   | bladderReso |
|   | lved        |
|   | Problems:   |
|   | * No        |
|   | resolved    |
|   | hospital    |
|   | problems. * |
|   |             |
|   | Procedures  |
|   | Performed:C |
|   | hief        |
|   | Complaint:R |
|   | eferral     |
|   | (Dr. Elliott)  |
|   | and Skin    |
|   | Complaint   |
|   | (right foot |
|   |  )Hospital  |
|   | Course:     |
|   | Frequent    |
|   | falls:      |
|   | Assessment: |
|   |   It was    |
|   | unclear     |
|   | initially   |
|   | The exact   |
|   | culprit and |
|   |  it was     |
|   | felt        |
|   | potentially |
|   |  she was a  |
|   | bit dry (as |
|   |  per        |
|   | nephrology  |
|   | who saw her |
|   |  this       |
|   | admission)  |
 
|   | as she had  |
|   | just had    |
|   | ultrafiltra |
|   | tion with   |
|   | HD, versus  |
|   | other       |
|   | culprit.    |
|   | However the |
|   |  patient    |
|   | did state   |
|   | that when   |
|   | she has     |
|   | recently    |
|   | fallen it   |
|   | was in the  |
|   | setting of  |
|   | not using   |
|   | her walker  |
|   | as she had  |
|   | been        |
|   | instructed  |
|   | to as she   |
|   | was only    |
|   | going a     |
|   | very short  |
|   | distance    |
|   | and felt    |
|   | she did not |
|   |  need it.   |
|   | She will be |
|   |  very       |
|   | diligent    |
|   | about using |
|   |  her walker |
|   |  and taking |
|   |  all needed |
|   |             |
|   | precautions |
|   | .  Coronary |
|   |  artery     |
|   | disease     |
|   | with        |
|   | history of  |
|   | CABG,       |
|   | peripheral  |
|   | vascular    |
|   | disease,    |
|   | hypertensio |
|   | n and       |
|   | hyperlipide |
|   | mark with    |
|   | history of  |
|   | paroxysmal  |
|   | atrial      |
|   | fibrillatio |
|   | n on        |
|   | anticoagula |
|   | tion:.      |
|   | Will resume |
|   |  PTA meds   |
 
|   | as below on |
|   |  d/c        |
|   | Overactive  |
|   | bladder:    |
|   | Continue    |
|   | oxybutynin. |
|   |   Requip    |
|   | for         |
|   | restless    |
|   | leg         |
|   | syndrome.   |
|   | Anxiety     |
|   | depression: |
|   |             |
|   | recommended |
|   |  to Refrain |
|   |  from using |
|   |             |
|   | benzodiazep |
|   | inesas much |
|   |  as         |
|   | possible    |
|   | and per d/w |
|   |  nephrology |
|   |  will       |
|   | decrease    |
|   | the ativan  |
|   | dose.       |
|   | Diabetes    |
|   | mellitus    |
|   | with        |
|   | diabetic    |
|   | nephropathy |
|   |  with       |
|   | end-stage   |
|   | renal       |
|   | disease on  |
|   | hemodialysi |
|   | s: Continue |
|   |  with       |
|   | current     |
|   | regimen for |
|   |  now,       |
|   | follow, and |
|   |  adjust as  |
|   | needed      |
|   | based on    |
|   | progress.   |
|   | F/u with    |
|   | outpatient  |
|   | providers   |
|   | With        |
|   | respect to  |
|   | her left    |
|   | foot prior  |
|   | injury and  |
|   | prior       |
|   | antibiotics |
|   | :           |
|   | Assessment: |
 
|   |   She was   |
|   | seen by ID  |
|   | here and    |
|   | recently    |
|   | had         |
|   | completed a |
|   |  course of  |
|   | antbx       |
|   | reportedly. |
|   |   They      |
|   | would like  |
|   | to have her |
|   |  off antbx  |
|   | for now and |
|   |  f/u        |
|   | withthem.   |
|   | Should f/u  |
|   | with her    |
|   | podiatrist  |
|   | dr abdi  |
|   | for her     |
|   | foot as     |
|   | well.       |
|   | Discharge   |
|   | Exam and    |
|   | Data:Vital  |
|   | Signs:BP    |
|   | 112/57 (BP  |
|   | Location:   |
|   | Right upper |
|   |  arm)  |    |
|   | Pulse 66  | |
|   |  Temp 98    |
|   |   F (36.7   |
|   |   C) (Oral) |
|   |   | Resp 18 |
|   |   | Ht      |
|   | 1.778 m (5' |
|   |  10")  | Wt |
|   |  65.3 kg    |
|   | (144 lb)  | |
|   |  SpO2 96%   |
|   | |           |
|   | Breastfeedi |
|   | ng? No  |   |
|   | BMI 20.66   |
|   | kg/m   I&O  |
|   | Last 3      |
|   | Shifts:  No |
|   |             |
|   | intake/outp |
|   | ut data     |
|   | recorded.Ph |
|   | ysical      |
|   | Examination |
|   | :           |
|   | Constitutio |
|   | nal: Alert  |
|   | and         |
|   | oriented to |
 
|   |  person,    |
|   | place, and  |
|   | time.       |
|   | Appears     |
|   | well-develo |
|   | ped and     |
|   | well-nouris |
|   | hed. HEENT: |
|   |  Neck       |
|   | supple, no  |
|   | JVD, non    |
|   | icteric     |
|   | sclera.Card |
|   | iovascular: |
|   |  Normal     |
|   | rate,       |
|   | regular     |
|   | rhythm,     |
|   | normal      |
|   | heart       |
|   | sounds, and |
|   |  intact     |
|   | distal      |
|   | pulses.     |
|   | Exam        |
|   | reveals no  |
|   | appreciated |
|   |  gallop or  |
|   | friction    |
|   | rub.  No    |
|   | murmur      |
|   | heard.Pulmo |
|   | nary/Chest: |
|   |  Effort     |
|   | normal and  |
|   | breath      |
|   | sounds      |
|   | normal. No  |
|   | stridor. No |
|   |             |
|   | respiratory |
|   |  distress.  |
|   |  no         |
|   | wheezes. no |
|   |  rales.     |
|   | exhibits no |
|   |             |
|   | tenderness. |
|   |  Abdominal: |
|   |  Soft.      |
|   | Bowel       |
|   | sounds are  |
|   | normal.     |
|   | exhibits no |
|   |             |
|   | distension. |
|   |  There is   |
|   | no          |
|   | tenderness. |
|   |  There is   |
 
|   | no rebound  |
|   | and no      |
|   | guarding.   |
|   | Extremeties |
|   | /Musculoske |
|   | letal:      |
|   | Normal      |
|   | general     |
|   | range of    |
|   | motion.exhi |
|   | bits no     |
|   | tenderness. |
|   |   exhibits  |
|   | no edema.   |
|   | Left foot   |
|   | in boot     |
|   | wrapped in  |
|   | dressing,   |
|   | see wound   |
|   | care and ID |
|   |  eval .     |
|   | Neurologica |
|   | l:  Alert   |
|   | and         |
|   | oriented to |
|   |  person,    |
|   | place, and  |
|   | time. Has   |
|   | normal      |
|   | reflexes.   |
|   | No gross    |
|   | cranial     |
|   | nerve or    |
|   | focal       |
|   | deficit.    |
|   | Exhibits    |
|   | normal      |
|   | muscle      |
|   | tone.       |
|   | Coordinatio |
|   | n           |
|   | normal.Skin |
|   | : Skin is   |
|   | warm and    |
|   | dry. No     |
|   | rash noted. |
|   |  No         |
|   | erythema.   |
|   | No pallor.  |
|   | Psychiatric |
|   | : Has a     |
|   | normal mood |
|   |  and affect |
|   |  given      |
|   | situation.  |
|   | Behavior is |
|   |  normal.    |
|   | Judgment    |
|   | normal for  |
|   | patient.    |
 
|   | Recent Labs |
|   |  Recent     |
|   | LabsLab     |
|   |  |
|   | 9 WBC 5.14  |
|   | HGB 10.1*   |
|   | HCT 30.9*   |
|   | *    |
|   | Recent      |
|   | LabsLab     |
|   |  |
|   | 9  K  |
|   | 4.7 CL 96*  |
|   | CO2 >40*    |
|   | BUN 9       |
|   | CREATININE  |
|   | 2.96* No    |
|   | results for |
|   |  input(s):  |
|   | INR in the  |
|   | last 168    |
|   | hours.Resul |
|   | ts          |
|   | Procedure   |
|   | Component   |
|   | Value Units |
|   |  Date/Time  |
|   |  Blood      |
|   | Culture Set |
|   |  2          |
|   | [[id number]]  |
|   | Collected:  |
|   |  19   |
|   | 1739        |
|   | Specimen:   |
|   | Blood from  |
|   | Blood,      |
|   | peripheral  |
|   | draw        |
|   | Updated:    |
|   | 19    |
|   | 2005  Blood |
|   |  Culture    |
|   | Set 1       |
|   | [[id number]]  |
|   | Collected:  |
|   |  19   |
|   | 1655        |
|   | Specimen:   |
|   | Blood from  |
|   | Blood Line  |
|   | Draw        |
|   | Updated:    |
|   | 19    |
|   | 2004        |
|   | Recent      |
|   | Radiology   |
|   | ResultsXr   |
|   | Toe Right 2 |
|   |  ViewResult |
 
|   |  Date:      |
|   | 2019No |
|   |             |
|   | radiographi |
|   | c evidence  |
|   | of          |
|   | osteomyelit |
|   | is seen.    |
|   | If further  |
|   | assessment  |
|   | is          |
|   | warranted,  |
|   | consider    |
|   | correlation |
|   |  with MRI.  |
|   | Potential   |
|   | acute       |
|   | fracture    |
|   | through the |
|   |  base of    |
|   | the distal  |
|   | phalanx.    |
|   | Signed by:  |
|   | Habbu, Gavin |
|   |  Sign       |
|   | Date/Time:  |
|   | 2019  |
|   | 5:15        |
|   | PMOutstandi |
|   | ng Issues:  |
|   |  F/u with   |
|   | podiatry,   |
|   | ID, PCP and |
|   |  resume HD. |
|   |   Discharge |
|   |             |
|   | Information |
|   | :Follow     |
|   | up:[facility]   |
|   | [address],      |
|   |  [address]     |
|   | [address],   |
|   | [place]         |
|   | [address] |
|   | [place]        |
|   | [id number] |
|   | [phone] |
|   | ule an      |
|   | appointment |
|   |  as soon as |
|   |  possible   |
|   | for a       |
|   | visitBrian  |
|   | L Abdi,  |
|   | [id number]      |
|   | [address], |
|   |  [place]        |
|   | [address] |
|   |  [place]         |
|   | [id number] |
 
|   | [phone] |
|   | ule an      |
|   | appointment |
|   |  as soon as |
|   |  possible   |
|   | for a       |
|   | visitplease |
|   |  continue   |
|   | prior to    |
|   | admission   |
|   | planJimmy   |
|   | [place],       |
|   | [id number]    |
|   | [address]  |
|   | [address] |
|   | [place]       |
|   | [id number] |
|   | [phone] |
|   | lease call  |
|   | to see when |
|   |  they would |
|   |  like to    |
|   | see you in  |
|   | follow up   |
|   | resume care |
|   |  with all   |
|   | providers   |
|   | you were    |
|   | seeing      |
|   | prior to    |
|   | admission   |
|   | Medication  |
|   | List        |
|   | CHANGE how  |
|   | you take    |
|   | these       |
|   | medications |
|   |   LORazepam |
|   |  1 MG       |
|   | tabletRefil |
|   | ls:         |
|   | 0Commonly   |
|   | known as:   |
|   | ATIVANTake  |
|   | 0.5 tablets |
|   |  by mouth   |
|   | every 8     |
|   | (eight)     |
|   | hours as    |
|   | needed for  |
|   | Anxiety.    |
|   | Patient     |
|   | states she  |
|   | has the 1mg |
|   |  tablets at |
|   |  home and   |
|   | that they   |
|   | are scored  |
|   | and she     |
|   | will split  |
 
|   | them in     |
|   | halfWhat    |
|   | changed:    |
|   | how much to |
|   |  take       |
|   | additional  |
|   | instruction |
|   | s  CONTINUE |
|   |  taking     |
|   | these       |
|   | medications |
|   |   albuterol |
|   |  108 (90    |
|   | Base)       |
|   | MCG/ACT     |
|   | inhalerRefi |
|   | lls:        |
|   | 0Commonly   |
|   | known as:   |
|   | PROVENTIL   |
|   | HFA;VENTOLI |
|   | N HFA       |
|   | apixaban    |
|   | 2.5 MG      |
|   | tabletQTY:  |
|   |  60         |
|   | tabletRefil |
|   | ls:         |
|   | 0Commonly   |
|   | known as:   |
|   | ELIQUISTake |
|   |  1 tablet   |
|   | by mouth 2  |
|   | (two) times |
|   |  daily.     |
|   | atorvastati |
|   | n 80 MG     |
|   | tabletRefil |
|   | ls:         |
|   | 0Commonly   |
|   | known as:   |
|   | LIPITOR     |
|   | carvedilol  |
|   | 12.5 MG     |
|   | tabletQTY:  |
|   |  60         |
|   | tabletRefil |
|   | ls:         |
|   | 0Doctor's   |
|   | comments:   |
|   | Hold for    |
|   | SBP less    |
|   | than        |
|   | 100Hold for |
|   |  HR less    |
|   | than        |
|   | 60Commonly  |
|   | known as:   |
|   | COREGTake 1 |
|   |  tablet by  |
 
|   | mouth 2     |
|   | (two) times |
|   |  daily with |
|   |  meals.     |
|   | esomeprazol |
|   | e 40 MG     |
|   | capsuleRefi |
|   | lls:        |
|   | 0Commonly   |
|   | known as:   |
|   | NEXIUM      |
|   | HYDROcodone |
|   | -acetaminop |
|   | hen 5-325   |
|   | MG per      |
|   | tabletQTY:  |
|   |  30         |
|   | tabletRefil |
|   | ls:         |
|   | 0Commonly   |
|   | known as:   |
|   | NORCOTake 1 |
|   |  tablet by  |
|   | mouth every |
|   |  4 (four)   |
|   | hours as    |
|   | needed.     |
|   | insulin     |
|   | aspart 100  |
|   | UNIT/ML     |
|   | injectionRe |
|   | fills:      |
|   | 0Commonly   |
|   | known as:   |
|   | NOVOLOG     |
|   | insulin     |
|   | degludec    |
|   | 100 UNIT/ML |
|   |             |
|   | injectionRe |
|   | fills:      |
|   | 0Commonly   |
|   | known as:   |
|   | TRESIBA     |
|   | isosorbide  |
|   | mononitrate |
|   |  60 MG 24   |
|   | hr          |
|   | tabletQTY:  |
|   |  30         |
|   | tabletRefil |
|   | ls:  1Take  |
|   | 1 tablet by |
|   |  mouth      |
|   | daily.      |
|   | losartan    |
|   | 100 MG      |
|   | tabletRefil |
|   | ls:         |
|   | 0Commonly   |
 
|   | known as:   |
|   | COZAAR      |
|   | nitroGLYCER |
|   | IN 0.4 MG   |
|   | SL          |
|   | tabletQTY:  |
|   |  30         |
|   | tabletRefil |
|   | ls:         |
|   | 0Doctor's   |
|   | comments:   |
|   | Hold for    |
|   | SBP less    |
|   | than        |
|   | 100Commonly |
|   |  known as:  |
|   |             |
|   | NITROSTATPl |
|   | ace 1       |
|   | tablet      |
|   | under the   |
|   | tongue      |
|   | every 5     |
|   | (five)      |
|   | minutes as  |
|   | needed for  |
|   | Chest pain. |
|   |             |
|   | nortriptyli |
|   | ne 25 MG    |
|   | capsuleRefi |
|   | lls:        |
|   | 0Commonly   |
|   | known as:   |
|   | PAMELOR     |
|   | ondansetron |
|   |  4 MG       |
|   | disintegrat |
|   | ing         |
|   | tabletRefil |
|   | ls:         |
|   | 0Commonly   |
|   | known as:   |
|   | ZOFRAN-ODT  |
|   | oxybutynin  |
|   | 5 MG        |
|   | tabletRefil |
|   | ls:         |
|   | 0Commonly   |
|   | known as:   |
|   | DITROPAN    |
|   | prochlorper |
|   | azine 5 MG  |
|   | tabletQTY:  |
|   |  60         |
|   | tabletRefil |
|   | ls:         |
|   | 11Doctor's  |
|   | comments:   |
|   | Please      |
 
|   | consider 90 |
|   |  day        |
|   | supplies to |
|   |  promote    |
|   | better      |
|   | adherenceTA |
|   | KE ONE      |
|   | TABLET BY   |
|   | MOUTH TWICE |
|   |  DAILY AS   |
|   | NEEDED FOR  |
|   | NAUSEA      |
|   | rOPINIRole  |
|   | 0.25 MG     |
|   | tabletRefil |
|   | ls:         |
|   | 0Commonly   |
|   | known as:   |
|   | REQUIP      |
|   | TRINTELLIX  |
|   | 20 MG       |
|   | TabsRefills |
|   | :  0Generic |
|   |  drug:      |
|   | Vortioxetin |
|   | e HBr  You  |
|   | might also  |
|   | be taking   |
|   | other       |
|   | medications |
|   |  not listed |
|   |  above. If  |
|   | you have    |
|   | questions   |
|   | about any   |
|   | of your     |
|   | other       |
|   | medications |
|   | , talk to   |
|   | the person  |
|   | who         |
|   | prescribed  |
|   | them or     |
|   | your        |
|   | Primary     |
|   | Care        |
|   | Provider.   |
|   |    Where to |
|   |  Get Your   |
|   | Medications |
|   |             |
|   | Information |
|   |  about      |
|   | where to    |
|   | get these   |
|   | medications |
|   |  is not yet |
|   |  available  |
|   |  Ask your   |
|   | nurse or    |
 
|   | doctor      |
|   | about these |
|   |             |
|   | medications |
|   |             |
|   | LORazepam 1 |
|   |  MG tablet  |
|   | Disposition |
|   | :           |
|   | HomeConditi |
|   | on:         |
|   | StableCode  |
|   | Status:     |
|   | Full        |
|   | CodeDischar |
|   | ge took 35  |
|   | minutes, to |
|   |  include    |
|   | final       |
|   | examination |
|   | ,           |
|   | discussion  |
|   | of          |
|   | admission,  |
|   | and         |
|   | preparation |
|   |  of         |
|   | prescriptio |
|   | ns,         |
|   | instruction |
|   | s for       |
|   | on-going    |
|   | care,       |
|   | follow-up   |
|   | and         |
|   | documentati |
|   | on of       |
|   | discharge   |
|   | summary.SC[facility] |
|   | TT          |
|   | SANNA, |
|   |  MD2:09 PM  |
+---+-------------+
 
 
 
+--------+-------------+---+----------------------+----------------------+
| / | Emergency   |   |   Chau Deleon,  |                      |
| 2019   |             |   | MD                   |                      |
+--------+-------------+---+----------------------+----------------------+
| / | Office      |   |   Kirt Mclaughlin MD     | PAD (peripheral      |
2019   | Visit       |   |                      | artery disease)      |
|        |             |   |                      | (HCC) (Primary Dx);  |
|        |             |   |                      | ESRD (end stage      |
|        |             |   |                      | renal disease) ([facility]) |
+--------+-------------+---+----------------------+----------------------+
| / | Telephone   |   |   Kristen Tam, |                      |
2019   |             |   |  RN                  |                      |
+--------+-------------+---+----------------------+----------------------+
| / | Telephone   |   |   Srinivasan Abdi,  | Follow-up            |
 
2019   |             |   | DPM                  | (appointment)        |
+--------+-------------+---+----------------------+----------------------+
/ | Orders Only |   |   Luisa Segovia   |                      |
2019   |             |   | CHELSEY TAN                |                      |
+--------+-------------+---+----------------------+----------------------+
/ | Hospital    |   |   Connie        | JASMYND (peripheral      |
|  - | Encounter   |   | MD Liat Jarrett, | vascular disease)    |
|        |             |   |  Moiz Porter MD    | ([facility]) (Primary Dx);  |
|              |   |                      | ESRD (end stage      |
|    |             |   |                      | renal disease) on    |
|        |             |   |                      | dialysis ([facility]);      |
|        |             |   |                      | Anemia in ESRD       |
|        |             |   |                      | (end-stage renal     |
|        |             |   |                      | disease) ([facility]);      |
|        |             |   |                      | Hypoalbuminemia;     |
|        |             |   |                      | Electrolyte          |
|        |             |   |                      | imbalance risk       |
+--------+-------------+---+----------------------+----------------------+
 
 
 
+---+-------------+
|   |   Discharge |
|   |  Summaries  |
|   | - Lyn,  |
|   | Prudence,     |
|   | MD-R2 -     |
|   | 2019  |
|   |  1:04 PM    |
|   | PST         |
|   | Formatting  |
|   | of this     |
|   | note may be |
|   |  different  |
|   | from the    |
|   | original.[facility] |
|   | [facility]        |
|   | [facility]    |
|   | [facility]     |
|   | [facility] |
|   | ce:         |
|   | Hospitalist |
|   | Resident    |
|   | Discharge   |
|   | SummaryDate |
|   |  of         |
|   | Admission:  |
|   |             |
|   | 2019Da |
|   | te of       |
|   | Discharge:  |
|   |             |
|   | 201[address]/ |
|   | Disc |
|   | harge       |
|   | Provider:   |
|   | Prudence      |
|   | Lyn    |
|   | [id number] |
|   | ent Team:   |
 
|   | Consulting  |
|   | Physician:  |
|   | Srinivasan APONTE     |
|   | Julian,    |
|   | DPMConsulti |
|   | ng          |
|   | Physician:  |
|   | Ethan      |
|   | Delmi,     |
|   | MDAdmitting |
|   |  Provider:  |
|   | Luiza      |
|   | Collingham, |
|   |             |
|   | MDDischarge |
|   |  Diagnoses: |
|   |             |
|   | Principal   |
|   | Problem:    |
|   | PVD         |
|   | (peripheral |
|   |  vascular   |
|   | disease)    |
|   | (HCC)Active |
|   |  Problems:  |
|   |  ESRD (end  |
|   | stage renal |
|   |  disease)   |
|   | (HCC)  Type |
|   |  2 diabetes |
|   |  mellitus   |
|   | with        |
|   | chronic     |
|   | kidney      |
|   | disease on  |
|   | chronic     |
|   | dialysis,   |
|   | with        |
|   | long-term   |
|   | current use |
|   |  of insulin |
|   |  (HCC)      |
|   | Anemia in   |
|   | ESRD        |
|   | (end-stage  |
|   | renal       |
|   | disease)    |
|   | (HCC)       |
|   | Hypertensio |
|   | n,          |
|   | essential   |
|   | Chronic     |
|   | systolic    |
|   | congestive  |
|   | heart       |
|   | failure     |
|   | (HCC)  COPD |
|   |  (chronic   |
|   | obstructive |
|   |  pulmonary  |
 
|   | disease)    |
|   | (HCC)       |
|   | Paroxysmal  |
|   | atrial      |
|   | fibrillatio |
|   | n (HCC)     |
|   | CAD         |
|   | (coronary   |
|   | artery      |
|   | disease)Res |
|   | olved       |
|   | Problems:   |
|   | * No        |
|   | resolved    |
|   | hospital    |
|   | problems.   |
|   | *BRIEF      |
|   | HISTORY OF  |
|   | PRESENTATIO |
|   | N:          |
|   | Patient     |
|   | Summary:    |
|   | Per Dr.     |
|   | Collingham' |
|   | s H and P   |
|   | from        |
|   | 2019:  |
|   | '69 y/o F   |
|   | with h/o    |
|   | ESRD on HD  |
|   | T,TH,Sat    |
|   | (Dr.        |
|   | Akoum),     |
|   | DM2, PAD    |
|   | s/p         |
|   | angioplasty |
|   |  of LLE and |
|   |             |
|   | transmetata |
|   | rsal        |
|   | amputation  |
|   | of left     |
|   | foot        |
|   | 18 by |
|   |  Dr.        |
|   | Abdi due |
|   |  to         |
|   | osteomyelit |
|   | is, CAD,    |
|   | s/p MI,     |
|   | CABG, AVR   |
|   | 2/18, HFrEF |
|   |  with last  |
|   | EF 20 to    |
|   | 25%, s/p    |
|   | AICD, AF on |
|   |  chronic    |
|   | anticoagula |
|   | tion who    |
|   | was sent to |
 
|   |  ED by   |
|   | [facility] for    |
|   | evaluation  |
|   | of right    |
|   | LE.         |
|   |   Patient   |
|   | reports     |
|   | that at HD  |
|   | yesterday   |
|   | it was      |
|   | noted that  |
|   | her right   |
|   | toes were   |
|   | red and     |
|   | dusky.      |
|   |   She went  |
|   | to see   |
|   | [facility] this   |
|   | morning and |
|   |  he was     |
|   | concerned   |
|   | that right  |
|   | toes could  |
|   | be ischemic |
|   |  or         |
|   | infected    |
|   | and         |
|   | referred to |
|   |  ED.        |
|   | 'HOSPITAL   |
|   | COURSE:     |
|   | Vascular    |
|   | surgery was |
|   |  consulted  |
|   | from the    |
|   | emergency   |
|   | department, |
|   |  they did a |
|   |  selective  |
|   | catheteriza |
|   | tion of the |
|   |  left       |
|   | common      |
|   | femoral     |
|   | artery and  |
|   | placed an   |
|   | SMA stent.  |
|   | The patient |
|   |  tolerated  |
|   | the         |
|   | procedure   |
|   | well.       |
|   | Podiatry    |
|   | was         |
|   | consulted,  |
|   | they        |
|   | recommended |
|   |  wound care |
|   |             |
|   | consultatio |
 
|   | n for the   |
|   | open wound  |
|   | on her left |
|   |  foot. They |
|   |  also       |
|   | stated that |
|   |  her right  |
|   | foot did    |
|   | not need a  |
|   | procedure   |
|   | for the     |
|   | toes at     |
|   | this time.  |
|   | Infectious  |
|   | disease is  |
|   | waiting for |
|   |  blood      |
|   | cultures    |
|   | and Gram    |
|   | stain and   |
|   | culture of  |
|   | the wound   |
|   | site to     |
|   | narrow her  |
|   | IV          |
|   | antibiotics |
|   | . She       |
|   | reported    |
|   | improved    |
|   | pain, no    |
|   | shortness   |
|   | of breath,  |
|   | no nausea   |
|   | and         |
|   | vomiting,   |
|   | she is      |
|   | making a    |
|   | small       |
|   | amount of   |
|   | urine and   |
|   | had a bowel |
|   |  movement   |
|   | today. She  |
|   | would like  |
|   | to go home. |
|   |  Physical   |
|   | therapy     |
|   | cleared her |
|   |  to go home |
|   |  with home  |
|   | assist.     |
|   | Nephrology  |
|   | was         |
|   | consulted   |
|   | and         |
|   | reported    |
|   | that they   |
|   | were        |
|   | amenable to |
|   |             |
 
|   | administeri |
|   | ng her IV   |
|   | antibiotics |
|   |  with       |
|   | dialysis.   |
|   | Infectious  |
|   | disease was |
|   |  consulted  |
|   | and agreed  |
|   | to this     |
|   | plan. Upon  |
|   | discharge   |
|   | the patient |
|   |  was        |
|   | eating,     |
|   | drinking,   |
|   | urinating,  |
|   | having      |
|   | bowel       |
|   | movements.s |
|   | he was not  |
|   | having      |
|   | fevers,     |
|   | nausea, or  |
|   | vomiting.   |
|   | No          |
|   | prescriptio |
|   | ns prior to |
|   |  admission. |
|   |  DISCHARGE  |
|   | EXAMVital   |
|   | Signs:BP    |
|   | 120/56 (BP  |
|   | Location:   |
|   | Right upper |
|   |  arm)  |    |
|   | Pulse 64  | |
|   |  Temp 97.5  |
|   |   F (36.4   |
|   |   C)        |
|   | (Axillary)  |
|   |  | Resp 18  |
|   |  | Ht 1.778 |
|   |  m (5' 10") |
|   |   | Wt 65.5 |
|   |  kg (144 lb |
|   |  6.4 oz)  | |
|   |  SpO2 97%   |
|   | |           |
|   | Breastfeedi |
|   | ng? No  |   |
|   | BMI 20.72   |
|   | kg/m        |
|   | Physical    |
|   | ExamGeneral |
|   |             |
|   | Appearance: |
|   |  Alert and  |
|   | cooperative |
|   | , sitting   |
 
|   | up in a     |
|   | chair by    |
|   | the bed and |
|   |  appears to |
|   |  be in no   |
|   | acute       |
|   | distress.   |
|   |   HEENNT:   |
|   | Normocephal |
|   | ic and      |
|   | atraumatic. |
|   |  Hearing    |
|   | grossly     |
|   | intact. No  |
|   | nasal       |
|   | discharge.  |
|   | No tracheal |
|   |  deviation. |
|   |             |
|   |   Cardiovas |
|   | cular:      |
|   | Rhythm and  |
|   | rate are    |
|   | regular.    |
|   | 2/6         |
|   | systolic    |
|   | murmur      |
|   | heard best  |
|   | over the    |
|   | left upper  |
|   | sternal     |
|   | border. No  |
|   | gallops or  |
|   | rubs        |
|   | appreciated |
|   | .           |
|   | Peripheral  |
|   | pulses 2+   |
|   | on the      |
|   | right. No   |
|   | lower       |
|   | extremity   |
|   | edema.  Pul |
|   | monary/Ches |
|   | t: Lungs    |
|   | are clear   |
|   | to          |
|   | auscultatio |
|   | n           |
|   | bilaterally |
|   | . Effort is |
|   |  normal.    |
|   | Normal      |
|   | breath      |
|   | sounds.     |
|   |   Abdominal |
|   | : Soft,     |
|   | nontender,  |
|   | nondistende |
|   | d.          |
 
|   | Normoactive |
|   |  bowel      |
|   | sounds. No  |
|   | guarding or |
|   |  rebound    |
|   | tenderness. |
|   |             |
|   |   Musculosk |
|   | eletal:     |
|   | Normal      |
|   | range of    |
|   | motion.     |
|   | Normal      |
|   | muscular    |
|   | development |
|   | . Patient   |
|   | with        |
|   | forefoot    |
|   | amputation  |
|   | on the      |
|   | left. Left  |
|   | foot        |
|   | wrapped in  |
|   | new         |
|   | dressing,   |
|   | C/D/I.      |
|   | Right foot  |
|   | with        |
|   | erythema    |
|   | over the    |
|   | great big   |
|   | toe and     |
|   | cyanosis    |
|   | over the    |
|   | second toe, |
|   |  improved   |
|   | from prior. |
|   |             |
|   | Neurologica |
|   | l: CN       |
|   | II-XII      |
|   | grossly     |
|   | intact.     |
|   | Oriented to |
|   |  person,    |
|   | place, and  |
|   | time.       |
|   |   Skin:     |
|   | Skin is     |
|   | warm and    |
|   | dry. No     |
|   | rash noted. |
|   |             |
|   |   Psychiatr |
|   | ic:The      |
|   | patient was |
|   |  able to    |
|   | demonstrate |
|   |  good       |
|   | judgement   |
 
|   | and reason, |
|   |  without    |
|   | hallucinati |
|   | ons,        |
|   | abnormal    |
|   | affect or   |
|   | abnormal    |
|   | behaviors   |
|   | during the  |
|   | examination |
|   | .           |
|   | DATARecent  |
|   | LabsLab     |
|   |  |
|   | 8           |
|   |  |
|   | 3           |
|   |  |
|   | 3 WBC 3.90  |
|   | 3.39* 5.53  |
|   | HGB 9.8*    |
|   | 9.5* 9.4*   |
|   | HCT 30.1*   |
|   | 29.2* 28.4* |
|   |  *   |
|   | 125* 147*   |
|   | NEUTOPHILPC |
|   | T  --       |
|   | 66.95  --   |
|   | MONOPCT  -- |
|   |   11.96  -- |
|   |   Recent    |
|   | LabsLab     |
|   |  |
|   | 8           |
|   |  |
|   | 3           |
|   |  |
|   | 3     |
|   | 140 139 K   |
|   | 3.9 4.2 4.0 |
|   |  CL 97* 101 |
|   |  100 CO2 31 |
|   |  28 29 BUN  |
|   | 13 26* 23   |
|   | CREATININE  |
|   | 4.8* 6.8*   |
|   | 6.1* PROT   |
|   | --   --     |
|   | 6.2*        |
|   | BILITOT  -- |
|   |    --  0.4  |
|   | ALT  --     |
|   | --  21 AST  |
|   |  --   --    |
|   | 24          |
|   | Phosphorus: |
|   |   Lab       |
|   | Results     |
|   | Component   |
 
|   | Value Date  |
|   |  PHOS 3.6   |
|   | 2019  |
|   | Invalid     |
|   | input(s):   |
|   | LABALBURece |
|   | nt LabsLab  |
|   |  |
|   | 8 MG 2.3    |
|   | Recent      |
|   | LabsLab     |
|   |  |
|   | 3 CKTOTAL   |
|   | 28*         |
|   | Intake/Outp |
|   | ut Summary  |
|   | (Last 24    |
|   | hours) at   |
|   | 19    |
|   | 1717Last    |
|   | data filed  |
|   | at 19 |
|   |  0850 Gross |
|   |  per 24     |
|   | hour Intake |
|   |             |
|   |    380 ml   |
|   | Output      |
|   |             |
|   | 0 ml Net    |
|   |             |
|   | 380 ml      |
|   | Microbiolog |
|   | y: Blood    |
|   | cultures -  |
|   | NGTDRadiolo |
|   | gyUs Lower  |
|   | Extremty    |
|   | Arterial    |
|   | RightResult |
|   |  Date:      |
|   | . |
|   |  Right leg  |
|   | shows       |
|   | biphasic    |
|   | inflow with |
|   |  a greater  |
|   | than 50%    |
|   | stenosis at |
|   |  the origin |
|   |  of the     |
|   | superficial |
|   |  femoral    |
|   | artery      |
|   | (137-309).  |
|   |  Biphasic   |
|   | outflow. 2. |
|   |  Two vessel |
|   |  runoff to  |
|   | the right   |
 
|   | foot.       |
|   | Signed by:  |
|   | Iuliano,    |
|   | Edward Sign |
|   |  Date/Time: |
|   |  2019 |
|   |  3:11       |
|   | PMPLANDispo |
|   | sition:     |
|   | HomeConditi |
|   | on:         |
|   | StableCode  |
|   | Status:     |
|   | Full CodeNo |
|   |  discharge  |
|   | procedures  |
|   | on          |
|   | file.Follow |
|   |  up:[facility]  |
|   | [facility]      |
|   | [facility]    |
|   | [id number] |
|   |  [facility]   |
|   | Dr Nhan      |
|   | [place]   |
|   | [place]  |
|   | [id number] |
|   | [phone] |
|   | Follow up   |
|   | in 3        |
|   | week(s)Agued[address] |
|   | [address],   |
|   |  [address]     |
|   | [address],   |
|   | [place]         |
|   | [address] |
|   | [place]        |
|   | [phone] |
|   | [phone] |
|   | [address],    |
|   |  [address]     |
|   | [address],   |
|   | [place]         |
|   | [address] |
|   | [place]        |
|   | [phone] |
|   | [phone] 1  |
|   | weekJimmy   |
|   | [place],       |
|   | [id number]    |
|   | [address]  |
|   | [address] |
|   | [place]       |
|   | [phone] |
|   | [phone]  |
|   | office for  |
|   | appointment |
|   |  Medication |
|   |  List       |
|   | START       |
 
|   | taking      |
|   | these       |
|   | medications |
|   |             |
|   | cefTAZidime |
|   |  2 g        |
|   | injectionQT |
|   | Y:  1       |
|   | eachRefills |
|   | :           |
|   | 0Commonly   |
|   | known as:   |
|   | FORTAZInjec |
|   | t 2 g into  |
|   | the muscle  |
|   | After       |
|   | Hemodialysi |
|   | s (see      |
|   | admin       |
|   | instruction |
|   | s) for 7    |
|   | days.       |
|   | vancomycin  |
|   | IVPBRefills |
|   | :           |
|   | 0Commonly   |
|   | known as:   |
|   | VANCOCINInj |
|   | ect 750 mg  |
|   | into the    |
|   | vein After  |
|   | Hemodialysi |
|   | s (see      |
|   | admin       |
|   | instruction |
|   | s) for 7    |
|   | days.       |
|   | Dilute in   |
|   | appropriate |
|   |  volume of  |
|   | IV fluid    |
|   | and give by |
|   |  slow IV    |
|   | infusion.   |
|   | CHANGE how  |
|   | you take    |
|   | these       |
|   | medications |
|   |   losartan  |
|   | 25 MG       |
|   | tabletQTY:  |
|   |  30         |
|   | tabletRefil |
|   | ls:         |
|   | 0Commonly   |
|   | known as:   |
|   | COZAARTake  |
|   | 1 tablet by |
|   |  mouth      |
|   | daily.What  |
 
|   | changed:    |
|   | how much to |
|   |  take       |
|   | CONTINUE    |
|   | taking      |
|   | these       |
|   | medications |
|   |   *         |
|   | albuterol   |
|   | 108 (90     |
|   | Base)       |
|   | MCG/ACT     |
|   | inhalerRefi |
|   | lls:        |
|   | 0Commonly   |
|   | known as:   |
|   | PROVENTIL   |
|   | HFA;VENTOLI |
|   | N HFA *     |
|   | VENTOLIN    |
|   |  (90 |
|   |  Base)      |
|   | MCG/ACT     |
|   | inhalerRefi |
|   | lls:        |
|   | 0Generic    |
|   | drug:       |
|   | albuterol   |
|   | apixaban    |
|   | 2.5 MG      |
|   | tabletQTY:  |
|   |  60         |
|   | tabletRefil |
|   | ls:         |
|   | 0Commonly   |
|   | known as:   |
|   | ELIQUISTake |
|   |  1 tablet   |
|   | by mouth 2  |
|   | (two) times |
|   |  daily.     |
|   | atorvastati |
|   | n 40 MG     |
|   | tabletQTY:  |
|   |  30         |
|   | tabletRefil |
|   | ls:         |
|   | 0Commonly   |
|   | known as:   |
|   | LIPITORTake |
|   |  1 tablet   |
|   | by mouth    |
|   | nightly.    |
|   | bisacodyl   |
|   | 10 MG       |
|   | suppository |
|   | Refills:    |
|   | 0Commonly   |
|   | known as:   |
|   | DULCOLAX    |
 
|   | calcium     |
|   | acetate 667 |
|   |  MG         |
|   | capsuleRefi |
|   | lls:        |
|   | 0Commonly   |
|   | known as:   |
|   | PHOSLO      |
|   | carvedilol  |
|   | 12.5 MG     |
|   | tabletQTY:  |
|   |  60         |
|   | tabletRefil |
|   | ls:         |
|   | 0Doctor's   |
|   | comments:   |
|   | Hold for    |
|   | SBP less    |
|   | than        |
|   | 100Hold for |
|   |  HR less    |
|   | than        |
|   | 60Commonly  |
|   | known as:   |
|   | COREGTake 1 |
|   |  tablet by  |
|   | mouth 2     |
|   | (two) times |
|   |  daily with |
|   |  meals.     |
|   | clopidogrel |
|   |  75 MG      |
|   | tabletQTY:  |
|   |  30         |
|   | tabletRefil |
|   | ls:         |
|   | 11Commonly  |
|   | known as:   |
|   | PLAVIXTake  |
|   | 1 tablet by |
|   |  mouth      |
|   | daily.      |
|   | esomeprazol |
|   | e 20 MG     |
|   | capsuleRefi |
|   | lls:        |
|   | 0Commonly   |
|   | known as:   |
|   | NEXIUM      |
|   | HYDROcodone |
|   | -acetaminop |
|   | hen 5-325   |
|   | MG per      |
|   | tabletQTY:  |
|   |  30         |
|   | tabletRefil |
|   | ls:         |
|   | 0Commonly   |
|   | known as:   |
|   | NORCOTake 1 |
 
|   |  tablet by  |
|   | mouth every |
|   |  4 (four)   |
|   | hours as    |
|   | needed.     |
|   | insulin     |
|   | aspart 100  |
|   | UNIT/ML     |
|   | injectionRe |
|   | fills:      |
|   | 0Commonly   |
|   | known as:   |
|   | NOVOLOG     |
|   | insulin     |
|   | degludec    |
|   | 100 UNIT/ML |
|   |             |
|   | injectionRe |
|   | fills:      |
|   | 0Commonly   |
|   | known as:   |
|   | TRESIBA     |
|   | isosorbide  |
|   | mononitrate |
|   |  60 MG 24   |
|   | hr          |
|   | tabletQTY:  |
|   |  30         |
|   | tabletRefil |
|   | ls:  1Take  |
|   | 1 tablet by |
|   |  mouth      |
|   | daily.      |
|   | loperamide  |
|   | 2 MG        |
|   | capsuleRefi |
|   | lls:        |
|   | 0Commonly   |
|   | known as:   |
|   | IMODIUM     |
|   | LORazepam 1 |
|   |  MG         |
|   | tabletQTY:  |
|   |  30         |
|   | tabletRefil |
|   | ls:         |
|   | 0Commonly   |
|   | known as:   |
|   | ATIVANTake  |
|   | 1 tablet by |
|   |  mouth      |
|   | every 8     |
|   | (eight)     |
|   | hours as    |
|   | needed for  |
|   | Anxiety.    |
|   | nitroGLYCER |
|   | IN 0.4 MG   |
|   | SL          |
|   | tabletQTY:  |
 
|   |  30         |
|   | tabletRefil |
|   | ls:         |
|   | 0Doctor's   |
|   | comments:   |
|   | Hold for    |
|   | SBP less    |
|   | than        |
|   | 100Commonly |
|   |  known as:  |
|   |             |
|   | NITROSTATPl |
|   | ace 1       |
|   | tablet      |
|   | under the   |
|   | tongue      |
|   | every 5     |
|   | (five)      |
|   | minutes as  |
|   | needed for  |
|   | Chest pain. |
|   |             |
|   | nortriptyli |
|   | ne 25 MG    |
|   | capsuleRefi |
|   | lls:        |
|   | 0Commonly   |
|   | known as:   |
|   | PAMELOR     |
|   | ondansetron |
|   |  4 MG       |
|   | disintegrat |
|   | ing         |
|   | tabletRefil |
|   | ls:         |
|   | 0Commonly   |
|   | known as:   |
|   | ZOFRAN-ODT  |
|   | oxybutynin  |
|   | 5 MG        |
|   | tabletRefil |
|   | ls:         |
|   | 0Commonly   |
|   | known as:   |
|   | DITROPAN    |
|   | prochlorper |
|   | azine 5 MG  |
|   | tabletQTY:  |
|   |  60         |
|   | tabletRefil |
|   | ls:         |
|   | 11Doctor's  |
|   | comments:   |
|   | Please      |
|   | consider 90 |
|   |  day        |
|   | supplies to |
|   |  promote    |
|   | better      |
|   | adherenceTA |
 
|   | KE ONE      |
|   | TABLET BY   |
|   | MOUTH TWICE |
|   |  DAILY AS   |
|   | NEEDED FOR  |
|   | NAUSEA      |
|   | ranitidine  |
|   | 150 MG      |
|   | tabletRefil |
|   | ls:         |
|   | 0Commonly   |
|   | known as:   |
|   | ZANTAC      |
|   | rOPINIRole  |
|   | 0.25 MG     |
|   | tabletRefil |
|   | ls:         |
|   | 0Commonly   |
|   | known as:   |
|   | REQUIP      |
|   | SLOW-MAG    |
|   | 71.5-119 MG |
|   |             |
|   | TbecRefills |
|   | :  0Generic |
|   |  drug:      |
|   | Magnesium   |
|   | Cl-Calcium  |
|   | Carbonate   |
|   | Vitamin D3  |
|   | 5000 units  |
|   | CapsRefills |
|   | :  0        |
|   | Vortioxetin |
|   | e HBr 10 MG |
|   |             |
|   | TabsRefills |
|   | :  0  *     |
|   | This list   |
|   | has 2       |
|   | medication( |
|   | s) that are |
|   |  the same   |
|   | as other    |
|   | medications |
|   |  prescribed |
|   |  for you.   |
|   | Read the    |
|   | directions  |
|   | carefully,  |
|   | and ask     |
|   | your doctor |
|   |  or other   |
|   | care        |
|   | provider to |
|   |  review     |
|   | them with   |
|   | you.    You |
|   |  might also |
|   |  be taking  |
 
|   | other       |
|   | medications |
|   |  not listed |
|   |  above. If  |
|   | you have    |
|   | questions   |
|   | about any   |
|   | of your     |
|   | other       |
|   | medications |
|   | , talk to   |
|   | the person  |
|   | who         |
|   | prescribed  |
|   | them or     |
|   | your        |
|   | Primary     |
|   | Care        |
|   | Provider.   |
|   |   STOP      |
|   | taking      |
|   | these       |
|   | medications |
|   |   aspirin   |
|   | 81 MG EC    |
|   | tablet      |
|   | Where to    |
|   | Get Your    |
|   | Medications |
|   |             |
|   | Information |
|   |  about      |
|   | where to    |
|   | get these   |
|   | medications |
|   |  is not yet |
|   |  available  |
|   |  Ask your   |
|   | nurse or    |
|   | doctor      |
|   | about these |
|   |             |
|   | medications |
|   |             |
|   | cefTAZidime |
|   |  2 g        |
|   | injection   |
|   |  vancomycin |
|   |  IVPB       |
|   | Prudence      |
|   | Lyn,    |
|   | MD-[id number]/2 |
|   | 0195:17 PM  |
+---+-------------+
 
 
 
+--------+-------------+---+----------------------+----------------------+
| / | Procedure   |   |                      |                      |
|    | Pass        |   |                      |                      |
 
+--------+-------------+---+----------------------+----------------------+
| / | Procedure   |   |                      |                      |
|    | Pass        |   |                      |                      |
+--------+-------------+---+----------------------+----------------------+
| / | Procedure   |   |                      |                      |
|    | Pass        |   |                      |                      |
+--------+-------------+---+----------------------+----------------------+
| / | Orders Only |   |   Jessica Marley,   |                      |
| 2019   |             |   | RDMS                 |                      |
+--------+-------------+---+----------------------+----------------------+
| / | Procedure   |   |                      |                      |
|    | Pass        |   |                      |                      |
+--------+-------------+---+----------------------+----------------------+
| / | Refill      |   |   João Asher MD  |                      |
|    |             |   |                      |                      |
+--------+-------------+---+----------------------+----------------------+
| / | Telephone   |   |   Srinivasan Abdi,  | Medication Problem   |
|    |             |   | DPM                  | (Requesting Rx for   |
|        |             |   |                      | Wheelchair)          |
+--------+-------------+---+----------------------+----------------------+
| / | Emergency   |   |   Nathaniel De Luna S, | Hx of CABG (Primary  |
|  - |             |   |  DO  Negro Lr,  | Dx); Chest pain in   |
|        |             |   | DO  Silas Ferrara MD  | adult; S/P MVR       |
| / |             |   |                      | (mitral valve        |
|    |             |   |                      | repair); Elevated    |
|        |             |   |                      | blood pressure       |
|        |             |   |                      | reading; Chronic     |
|        |             |   |                      | systolic congestive  |
|        |             |   |                      | heart failure (HCC); |
|        |             |   |                      |  Chest pain,         |
|        |             |   |                      | unspecified type     |
+--------+-------------+---+----------------------+----------------------+
 
 
 
+---+-------------+
|   |   Discharge |
|   |  Summaries  |
|   | - Alem,    |
|   | Arwa, MD -  |
|   | 2019  |
|   |  8:20 AM    |
|   | PST         |
|   | Formatting  |
|   | of this     |
|   | note may be |
|   |  different  |
|   | from the    |
|   | original.[facility] |
|   | [facility]        |
|   | [facility]    |
|   [facility]     |
|   [facility] |
|   | ce:         |
|   | Hospitalist |
|   | Discharge   |
|   | SummaryDate |
|   |  of         |
|   | Admission:  |
|   |             |
 
|   | 2019Da |
|   | te of       |
|   | Discharge:  |
|   |             |
|   | 2019Di |
|   | scharge     |
|   | Physician:  |
|   |  Arwa Z     |
|   | Alem,      |
|   | MDTreatment |
|   |  Team:      |
|   | Admitting   |
|   | Provider:   |
|   | Negro A      |
|   | Brown,      |
|   | DODischarge |
|   |  Diagnoses: |
|   |             |
|   | Principal   |
|   | Problem:    |
|   | Chest       |
|   | painActive  |
|   | Problems:   |
|   | Coronary    |
|   | artery      |
|   | disease     |
|   | involving   |
|   | native      |
|   | coronary    |
|   | artery of   |
|   | native      |
|   | heart       |
|   | without     |
|   | angina      |
|   | pectoris    |
|   | ESRD (end   |
|   | stage renal |
|   |  disease)   |
|   | (HCC)  Type |
|   |  2 diabetes |
|   |  mellitus   |
|   | with        |
|   | chronic     |
|   | kidney      |
|   | disease on  |
|   | chronic     |
|   | dialysis,   |
|   | with        |
|   | long-term   |
|   | current use |
|   |  of insulin |
|   |  (HCC)      |
|   | Anemia in   |
|   | ESRD        |
|   | (end-stage  |
|   | renal       |
|   | disease)    |
|   | (HCC)       |
|   | Hypertensio |
|   | n,          |
 
|   | essential   |
|   | Pleural     |
|   | effusion    |
|   | Chronic     |
|   | systolic    |
|   | congestive  |
|   | heart       |
|   | failure     |
|   | (HCC)       |
|   | Chronic     |
|   | diarrhea    |
|   | Chronic     |
|   | anticoagula |
|   | tion        |
|   | Cardiomyopa |
|   | thy (HCC)   |
|   | COPD        |
|   | (chronic    |
|   | obstructive |
|   |  pulmonary  |
|   | disease)    |
|   | (HCC)  ICD  |
|   | (implantabl |
|   | e           |
|   | cardioverte |
|   | r-defibrill |
|   | ator) in    |
|   | place       |
|   | Paroxysmal  |
|   | atrial      |
|   | fibrillatio |
|   | n (HCC)     |
|   | Chronic     |
|   | multifocal  |
|   | osteomyelit |
|   | is of left  |
|   | foot (HCC)  |
|   |  PVD        |
|   | (peripheral |
|   |  vascular   |
|   | disease)    |
|   | (HCC)Resolv |
|   | ed          |
|   | Problems:   |
|   | * No        |
|   | resolved    |
|   | hospital    |
|   | problems.   |
|   | *Procedures |
|   | :  * No     |
|   | surgery     |
|   | found       |
|   | *Significan |
|   | t           |
|   | Diagnostic  |
|   | Studies:    |
|   | No results  |
|   | found.BRIEF |
|   |  HISTORY OF |
|   |             |
 
|   | PRESENTATIO |
|   | N:          |
|   | Cherie       |
|   | Cinthya     |
|   | Wilfredo is a |
|   |  68 y.o.    |
|   | female who  |
|   | presented   |
|   | per         |
|   | H&P"from    |
|   | [facility] |
|   |  because of |
|   |  chest pain |
|   |  that       |
|   | started     |
|   | last night  |
|   | about       |
|   | midnight    |
|   | and lasted  |
|   | until about |
|   |  5 AM this  |
|   | morning,    |
|   | pressure,   |
|   | intense,    |
|   | nonradiatin |
|   | g, with     |
|   | nausea and  |
|   | dyspnea, no |
|   |  sweating.  |
|   | Improved    |
|   | with SL NTG |
|   |  initially, |
|   |  but the    |
|   | NTG's       |
|   | became less |
|   |  effective  |
|   | as she took |
|   |  more of    |
|   | them (5     |
|   | total). Got |
|   |  a NTG      |
|   | patch at    |
|   | [facility]        |
|   | [facility] |
|   |  which was  |
|   | very        |
|   | effective   |
|   | in          |
|   | controlling |
|   |  the pain.  |
|   | She has     |
|   | very        |
|   | complicated |
|   |  cardiac    |
|   | history     |
|   | including   |
|   | CAD, CABG,  |
|   | systolic    |
|   | CHF,        |
|   | ischemic    |
 
|   | cardiomyopa |
|   | thy,        |
|   | pacemaker,  |
|   | valve       |
|   | repair,     |
|   | afib,       |
|   | anticoagula |
|   | tion, with  |
|   | several     |
|   | other       |
|   | comorbiditi |
|   | es          |
|   | including   |
|   | COPD, ESRD  |
|   | on HD, DM2, |
|   |  PVD. She   |
|   | has a       |
|   | cardiologis |
|   | t in [place]  [place]
|   [place] but   |
|   | [facility]        |
|   | [facility]    |
|   | refused to  |
|   | call that   |
|   | doctor to   |
|   | aid in      |
|   | decision-ma |
|   | nichelle and    |
|   | simply      |
|   | transferred |
|   |  the        |
|   | patient to  |
|   | [facility]      |
|   | ED.  The    |
|   | patient was |
|   |  at [facility]  |
|   | a month ago |
|   |  for L foot |
|   |  toe        |
|   | amputation  |
|   | due to      |
|   | osteomyelit |
|   | is. Dr      |
|   | Abdi is  |
|   | her         |
|   | surgeon.    |
|   | During that |
|   |             |
|   | hospitaliza |
|   | tion she    |
|   | c/o chest   |
|   | pain with   |
|   | troponin    |
|   | elevation   |
|   | to 0.318    |
|   | and was     |
|   | evaluated   |
|   | by          |
|   | cardiology. |
|   |  Stress     |
 
|   | test a      |
|   | month prior |
|   |  had been   |
|   | normal. It  |
|   | was decided |
|   |  that       |
|   | medical     |
|   | management  |
|   | was the     |
|   | only        |
|   | reasonable  |
|   | option for  |
|   | the         |
|   | patient,    |
|   | given her   |
|   | extensive   |
|   | comorbiditi |
|   | es.  Post-a |
|   | mputation   |
|   | she has     |
|   | been        |
|   | instructed  |
|   | to be       |
|   | strict NWB  |
|   | on LLE, and |
|   |  she was    |
|   | sent to     |
|   | [facility]     |
|   | [facility]. She    |
|   | remains on  |
|   | abx         |
|   | post-proced |
|   | ure managed |
|   |  by Dr      |
|   | Earl. The   |
|   | patient is  |
|   | convinced   |
|   | that the    |
|   | SNF is not  |
|   | administeri |
|   | ng her meds |
|   |  correctly. |
|   |  She thinks |
|   |  they might |
|   |  be         |
|   | skipping    |
|   | her         |
|   | Eliquis,    |
|   | clopidogrel |
|   | , and       |
|   | carvedilol. |
|   |  This       |
|   | happened    |
|   | before,     |
|   | earlier in  |
|   | 2018. She   |
|   | says the    |
|   | nurses      |
|   | chart that  |
|   | the meds    |
 
|   | are given   |
|   | but they    |
|   | are not     |
|   | given. She  |
|   | does not    |
|   | want to     |
|   | return to   |
|   | [facility], if |
|   |  at all     |
|   | possible,   |
|   | and wants   |
|   | to return   |
|   | home with   |
|   | paid        |
|   | caregiver   |
|   | if that     |
|   | will comply |
|   |  with Dr    |
|   | Abdi's   |
|   | post-op     |
|   | instruction |
|   | s"HOSPITAL  |
|   | COURSE:     |
|   | She was     |
|   | seen by     |
|   |  and |
|   |  he recc to |
|   |  stop all   |
|   | IV abx.She  |
|   | was         |
|   | discharged  |
|   | home with   |
|   | full time   |
|   | care giver. |
|   |  She was    |
|   | seen by     |
|   |  |
|   |  and he     |
|   | recc        |
|   | medical     |
|   | management  |
|   | Patient was |
|   |  discharged |
|   |  in stable  |
|   | condition.  |
|   | Addressed   |
|   | all of      |
|   | their       |
|   | questions   |
|   | and covered |
|   |  with side  |
|   | affects of  |
|   | newly added |
|   |             |
|   | medications |
|   | . she was   |
|   | cleared per |
|   |  consulting |
|   |             |
|   | services.Pa |
 
|   | st Medical  |
|   | History     |
|   | Diagnosis   |
|   | Date        |
|   |   Acute MI  |
|   | (HCC)   |
|   |  with       |
|   | stenting x  |
|   | 1           |
|   |   Anemia    |
|   | associated  |
|   | with        |
|   | chronic     |
|   | renal       |
|   | failure     |
|   | 2016   |
|   |             |
|   |   Atrial    |
|   | fibrillatio |
|   | n (HCC)     |
|   |   Chronic   |
|   | kidney      |
|   | disease     |
|   |             |
|   | Congestive  |
|   | heart       |
|   | failure     |
|   | (HCC)       |
|   |   COPD      |
|   | (chronic    |
|   | obstructive |
|   |  pulmonary  |
|   | disease)    |
|   | (HCC)       |
|   |   COPD      |
|   | (chronic    |
|   | obstructive |
|   |  pulmonary  |
|   | disease)    |
|   | (HCC)       |
|   | 2018   |
|   |             |
|   |   Coronary  |
|   | artery      |
|   | disease     |
|   | stent       |
|   | placements: |
|   |  3 total    |
|   |             |
|   | Depression  |
|   |             |
|   |   Diabetes  |
|   | other       |
|   | abstracted  |
|   |             |
|   |   Diabetes  |
|   | mellitus,   |
|   | type 2      |
|   | (HCC)       |
|   |   ESRD on   |
 
|   | peritoneal  |
|   | dialysis    |
|   | (HCC)       |
|   |   GERD      |
|   | (gastroesop |
|   | hageal      |
|   | reflux      |
|   | disease)    |
|   | 1992        |
|   |             |
|   | Hemodialysi |
|   | s patient   |
|   | (HCC)       |
|   | 10/2016     |
|   | Stopped     |
|   | peritoneal  |
|   | and         |
|   | restarted   |
|   | hemodialysi |
|   | s           |
|   |             |
|   | Hemorrhage  |
|   | of          |
|   | gastrointes |
|   | tinal       |
|   | tract,      |
|   | unspecified |
|   |  2017    |
|   | low GI,     |
|   | rectal      |
|   | bleeding    |
|   |   High      |
|   | blood       |
|   | pressure    |
|   | other       |
|   | abstracted  |
|   |             |
|   |   High      |
|   | cholesterol |
|   |  other      |
|   | abstracted  |
|   |             |
|   |             |
|   | Hyperlipide |
|   | mark         |
|   |             |
|   | Hypertensio |
|   | n           |
|   |             |
|   | Hyponatremi |
|   | a 2017  |
|   |             |
|   |             |
|   | Myocardial  |
|   | infarction  |
|   | (HCC)       |
|   | 2017     |
|   |   Other     |
|   | chronic     |
|   | pain        |
 
|   | feet,       |
|   |   Pain of   |
|   | left hip    |
|   | joint       |
|   | 2016  |
|   |  due to hip |
|   |  fracture   |
|   | and         |
|   | replacement |
|   |             |
|   |   Partial   |
|   | small bowel |
|   |             |
|   | obstruction |
|   |  (HCC)      |
|   | 2017  |
|   |  resolved   |
|   |             |
|   |   Renal     |
|   | failure     |
|   | Stage 5.    |
|   | Fistula in  |
|   | the JAN -   |
|   | not on      |
|   | dialysis    |
|   | yet.        |
|   |             |
|   | Unspecified |
|   |  visual     |
|   | disturbance |
|   |    glasses  |
|   | Past        |
|   | Surgical    |
|   | History     |
|   | Procedure   |
|   | Laterality  |
|   | Date        |
|   |   AV        |
|   | FISTULA     |
|   | PLACEMENT   |
|   |   left  |
|   | upper arm   |
|   | AV fistula  |
|   | - failed    |
|   |   AV        |
|   | FISTULA     |
|   | REPAIR N/A  |
|   | 10/25/2016  |
|   |  Procedure: |
|   |  AV FISTULA |
|   |  - GRAFT    |
|   | REPAIR/REVI |
|   | PRANEETH;       |
|   | Surgeon:    |
|   | Kirt GRAHAM Mclaughlin,  |
|   | MD;         |
|   | Location:   |
|   | [facility] MAIN   |
|   | OR;         |
|   | Service:    |
 
|   | Vascular;   |
|   | Laterality: |
|   |  N/A;       |
|   | Superficial |
|   | ization and |
|   |  revision   |
|   | of left arm |
|   |  fistula    |
|   |   CARDIAC   |
|   | CATHETERIZA |
|   | TION        |
|   | 2017  |
|   |             |
|   |   CARPAL    |
|   | TUNNEL      |
|   | RELEASE     |
|   | Bilateral   |
|   | other       |
|   | abstracted  |
|   |             |
|   |   CATARACT  |
|   | EXTRACTION  |
|   | Bilateral   |
|   |         |
|   |   CATHETER  |
|   | REMOVAL N/A |
|   |  2016 |
|   |             |
|   | Procedure:  |
|   | DIALYSIS    |
|   | CATHETER -  |
|   | REMOVAL;    |
|   | Surgeon:    |
|   | Kirt Y Arnoldo,  |
|   | MD;         |
|   | Location:   |
|   | [facility] MA[facility]   |
|   | OR;         |
|   | Service:    |
|   | Vascular;   |
|   | Laterality: |
|   |  N/A;  PD   |
|   | cath        |
|   | removal     |
|   |             |
|   | CHOLECYSTEC |
|   | FELY  other |
|   |             |
|   | abstracted  |
|   |             |
|   |             |
|   | COLONOSCOPY |
|   |             |
|   |   CORONARY  |
|   | ARTERY      |
|   | BYPASS      |
|   | GRAFT       |
|   |   CORONARY  |
|   | STENT       |
|   | PLACEMENT   |
 
|   | 2017  |
|   |  Drug       |
|   | Elutin |
|   |  stents to  |
|   | OM, 1 stent |
|   |  to prox.   |
|   | LAD         |
|   |   EYE       |
|   | SURGERY     |
|   |             |
|   |   FOOT      |
|   | AMPUTATION  |
|   | Left        |
|   | 2018  |
|   |  Procedure: |
|   |  FOREFOOT - |
|   |             |
|   | AMPUTATION; |
|   |   Surgeon:  |
|   | Srinivasan L     |
|   | Abdi,    |
|   | DPM;        |
|   | Location:   |
|   | [facility]   |
|   | OR;         |
|   | Service:    |
|   | Podiatry;   |
|   | Laterality: |
|   |  Left;      |
|   |   HARDWARE  |
|   | PRESENT     |
|   | stent       |
|   | placements  |
|   | x 3, Left   |
|   | hip         |
|   | replacement |
|   | , vascular  |
|   | stents 2 in |
|   |  left leg   |
|   |             |
|   |   HIP       |
|   | ARTHROPLAST |
|   | Y Left      |
|   | 2016   |
|   | Procedure:  |
|   | HIP -       |
|   | ARTHROPLAST |
|   | Y(ALLISON);    |
|   | Surgeon:    |
|   | Uriel  |
|   | Black, MD;  |
|   |  Location:  |
|   | [facility] MAIN   |
|   | OR;         |
|   | Service:    |
|   | Orthopedics |
|   | ;           |
|   | Laterality: |
|   |  Left;      |
|   |             |
 
|   | HYSTERECTOM |
|   | Y  1998     |
|   | complete    |
|   |   PACEMAKER |
|   |  INSERTION  |
|   |    [place]   |
|   | scientific  |
|   | pacemaker/d |
|   | efib        |
|   |             |
|   | PERITONEAL  |
|   | CATHETER    |
|   | INSERTION   |
|   | 8/15/2013   |
|   |             |
|   |             |
|   | PERITONEAL  |
|   | CATHETER    |
|   | INSERTION   |
|   | N/A         |
|   | 2016   |
|   | Procedure:  |
|   | LAPAROSCOPI |
|   | C -         |
|   | PERITONEAL  |
|   | DIALYSIS    |
|   | CATH        |
|   | INSERTION;  |
|   |  Surgeon:   |
|   | Kirt Y Arnoldo,  |
|   | MD;         |
|   | Location:   |
|   | [facility] MAIN   |
|   | OR;         |
|   | Service:    |
|   | Vascular;   |
|   | Laterality: |
|   |  N/A;       |
|   | Removal of  |
|   | old         |
|   | catheter    |
|   |   SHOULDER  |
|   | SURGERY     |
|   | Right 1980  |
|   |  frozen     |
|   | shoulder    |
|   |   UNLISTED  |
|   | PROCEDURE   |
|   | ARTHROSCOPY |
|   |     total   |
|   | Left hip    |
|   |   vascular  |
|   | stents Left |
|   |  2017    |
|   | leg (2      |
|   | stents)     |
|   |   VASCULAR  |
|   | SURGERY     |
|   | Allergies   |
|   | Allergen    |
 
|   | Reactions   |
|   |             |
|   |   Quinapril |
|   |  Hcl        |
|   | Anaphylaxis |
|   |             |
|   |   Digoxin   |
|   | And Related |
|   |  Other (See |
|   |  Comments)  |
|   |   toxic     |
|   |             |
|   | Metaxalone  |
|   | Other (See  |
|   | Comments)   |
|   |             |
|   |   Morphine  |
|   | Mental      |
|   | Changes     |
|   | Make her    |
|   | crazy/      |
|   | "funny      |
|   | feeling in  |
|   | my head"Has |
|   |  used       |
|   | hydromorpho |
|   | ne in-house |
|   |             |
|   |             |
|   | Pantoprazol |
|   | e Sodium    |
|   | Nausea and  |
|   | Vomiting    |
|   |             |
|   | Penicillins |
|   |  Rash       |
|   |   Quinapril |
|   |  Hcl Edema  |
|   |   Facial    |
|   | Edema       |
|   |   Sudafed   |
|   | [Pseudoephe |
|   | drine Hcl]  |
|   | Rash No     |
|   | prescriptio |
|   | ns prior to |
|   |  admission. |
|   |  DISCHARGE  |
|   | EXAMVital   |
|   | Signs:BP    |
|   | 108/56 (BP  |
|   | Location:   |
|   | Right upper |
|   |  arm)  |    |
|   | Pulse 68  | |
|   |  Temp 97.7  |
|   |   F (36.5   |
|   |   C)        |
|   | (Axillary)  |
|   |  | Resp 20  |
 
|   |  | Ht 1.778 |
|   |  m (5' 10") |
|   |   | Wt 65.5 |
|   |  kg (144 lb |
|   |  6.4 oz)  | |
|   |  SpO2 92%   |
|   | | BMI 20.72 |
|   |  kg/m       |
|   | General     |
|   | appearance: |
|   |  alert,     |
|   | appears     |
|   | stated age, |
|   |             |
|   | cooperative |
|   | , fatigued  |
|   | and no      |
|   | distressHea |
|   | d:          |
|   | Normocephal |
|   | ic, without |
|   |  obvious    |
|   | abnormality |
|   | ,           |
|   | atraumaticN |
|   | chloe: no     |
|   | adenopathy, |
|   |  no carotid |
|   |  bruit, no  |
|   | JVD,        |
|   | supple,     |
|   | symmetrical |
|   | , trachea   |
|   | midline and |
|   |  thyroid    |
|   | not         |
|   | enlarged,   |
|   | symmetric,  |
|   | no          |
|   | tenderness/ |
|   | mass/nodule |
|   | sLungs:     |
|   | clear to    |
|   | auscultatio |
|   | n           |
|   | bilaterally |
|   | Heart:      |
|   | regular     |
|   | rate and    |
|   | rhythm, S1, |
|   |  S2 normal, |
|   |  no murmur, |
|   |  click, rub |
|   |  or         |
|   | gallopAbdom |
|   | en: soft,   |
|   | non-tender; |
|   |  bowel      |
|   | sounds      |
|   | normal; no  |
 
|   | masses,  no |
|   |             |
|   | organomegal |
|   | yExtremitie |
|   | s: left leg |
|   |  boot       |
|   | Pulses: 2+  |
|   | and         |
|   | symmetricNe |
|   | urologic:   |
|   | Grossly     |
|   | normal AXO3 |
|   |  non focal  |
|   | DATACBC:    |
|   | Lab Results |
|   |  Component  |
|   | Value Date  |
|   |  WBC 4.08   |
|   | 2019  |
|   |  RBC 2.83   |
|   | (L)         |
|   | 2019  |
|   |  HGB 9.7    |
|   | (L)         |
|   | 2019  |
|   |  HCT 29.7   |
|   | (L)         |
|   | 2019  |
|   |  .0  |
|   | (H)         |
|   | 2019  |
|   |  MCH 34.2   |
|   | (H)         |
|   | 2019  |
|   |  MCHC 32.5  |
|   | 2019  |
|   |  RDW 64.3   |
|   | (H)         |
|   | 2019  |
|   |      |
|   | (L)         |
|   | 2019  |
|   |  MPV 9.5    |
|   | 2019  |
|   |  DIFFTYPE   |
|   | MANUAL      |
|   | 2019  |
|   | CMP:  Lab   |
|   | Results     |
|   | Component   |
|   | Value Date  |
|   |       |
|   | 2019  |
|   |  K 4.4      |
|   | 2019  |
|   |       |
|   | 2019  |
|   |  CO2 28     |
|   | 2019  |
|   |  ANIONGAP   |
 
|   | 14          |
|   | 2019  |
|   |  GLUF 153   |
|   | (H)         |
|   | 2019  |
|   |  BUN 15     |
|   | 2019  |
|   |  CREATININE |
|   |  4.9 (H)    |
|   | 2019  |
|   |  BCR 3      |
|   | 2019  |
|   |  CA 9.6     |
|   | 2019  |
|   |  CA 8.7     |
|   | 2016  |
|   |  PROT 6.1   |
|   | (L)         |
|   | 2019  |
|   |  ALB 2.6    |
|   | (L)         |
|   | 2019  |
|   |  GLOB 3.5   |
|   | 2019  |
|   |  BILITOT    |
|   | 0.5         |
|   | 2019  |
|   |  ALP 85     |
|   | 2019  |
|   |  AST 22     |
|   | 2019  |
|   |  ALT 22     |
|   | 2019  |
|   |  EGFR 9 (L) |
|   |  2019 |
|   |  Lab        |
|   | Results     |
|   | Component   |
|   | Value Date  |
|   |  CKTOTAL 22 |
|   |  (L)        |
|   | 2019  |
|   |  CKMB 1.8   |
|   | 2019  |
|   |  CKMBINDEX  |
|   | 8.2         |
|   | 2019  |
|   |  TROPONINI  |
|   | 0.061       |
|   | 2019  |
|   | Disposition |
|   | :           |
|   | HomeConditi |
|   | on:         |
|   | StableCode  |
|   | Status:     |
|   | PriorDischa |
|   | rge         |
|   | Instruction |
|   | sDiet Renal |
 
|   |  Diet Low   |
|   | Sodium      |
|   | Activity as |
|   |  Advised by |
|   |  Physical   |
|   | Therapy     |
|   | Call MD     |
|   | for:        |
|   | Temperature |
|   |  > 100.4  F |
|   |  (38  C)    |
|   | Call MD     |
|   | for:        |
|   | Persistant  |
|   | Nausea and  |
|   | Vomiting    |
|   | Call MD     |
|   | for:        |
|   | Severe      |
|   | Uncontrolle |
|   | d Pain Call |
|   |  MD for:    |
|   | Redness,    |
|   | Tenderness, |
|   |  or Signs   |
|   | of          |
|   | Infection   |
|   | (Pain,      |
|   | Swelling,   |
|   | Redness,    |
|   | Odor or     |
|   | Green/Yello |
|   | w Discharge |
|   |  Around     |
|   | Incision    |
|   | Site) Call  |
|   | MD for:     |
|   | Hives Call  |
|   | MD for:     |
|   | Difficulty  |
|   | Breathing,  |
|   | Headache or |
|   |  Visual     |
|   | Disturbance |
|   | s Call MD   |
|   | for:        |
|   | Persistant  |
|   | Dizziness   |
|   | or          |
|   | Light-Heade |
|   | dness Call  |
|   | MD for:     |
|   | Extreme     |
|   | Fatigue     |
|   | Follow      |
|   | up:Ivy   |
|   | Couch,      |
|   |  [address]     |
|   | [address],   |
|   | [place]         |
 
|   | [address] |
|   | [place]        |
|   | [id number] |
|   | 1-10Sched |
|   | ule an      |
|   | appointment |
|   |  as soon as |
|   |  possible   |
|   | for a visit |
|   |  in 1       |
|   | weekJimmy   |
|   | [place],       |
|   | [id number]    |
|   | [address]  |
|   | [address] |
|   | [place]       |
|   | [id number] |
|   | [phone] |
|   | ule an      |
|   | appointment |
|   |  as soon as |
|   |  possible   |
|   | for a visit |
|   |  in 1       |
|   | weekBrian L |
|   |  [place],   |
|   | [id number]      |
|   | [address], |
|   |  [place]        |
|   | [address] |
|   |  [place]         |
|   | [id number] |
|   | [phone] |
|   | ule an      |
|   | appointment |
|   |  as soon as |
|   |  possible   |
|   | for a visit |
|   |  in 2       |
|   | weeksMershe |
|   | d [place], |
|   |  [id number]     |
|   | [address] |
|   |  [place]        |
|   | [place]   |
|   | [place]          |
|   | [id number] |
|   | [phone]    |
|   | needed      |
|   | Medication  |
|   | List        |
|   | CHANGE how  |
|   | you take    |
|   | these       |
|   | medications |
|   |             |
|   | carvedilol  |
|   | 12.5 MG     |
|   | tabletQTY:  |
|   |  60         |
 
|   | tabletRefil |
|   | ls:         |
|   | 0Doctor's   |
|   | comments:   |
|   | Hold for    |
|   | SBP less    |
|   | than        |
|   | 100Hold for |
|   |  HR less    |
|   | than        |
|   | 60Commonly  |
|   | known as:   |
|   | COREGTake 1 |
|   |  tablet by  |
|   | mouth 2     |
|   | (two) times |
|   |  daily with |
|   |  meals.What |
|   |  changed:   |
|   |  medication |
|   |  strength   |
|   |  how much   |
|   | to take     |
|   | LORazepam 1 |
|   |  MG         |
|   | tabletQTY:  |
|   |  30         |
|   | tabletRefil |
|   | ls:         |
|   | 0Commonly   |
|   | known as:   |
|   | ATIVANTake  |
|   | 1 tablet by |
|   |  mouth      |
|   | every 8     |
|   | (eight)     |
|   | hours as    |
|   | needed for  |
|   | Anxiety.Wha |
|   | t changed:  |
|   |  when to    |
|   | take this   |
|   | losartan 25 |
|   |  MG         |
|   | tabletQTY:  |
|   |  30         |
|   | tabletRefil |
|   | ls:         |
|   | 0Commonly   |
|   | known as:   |
|   | COZAARTake  |
|   | 1 tablet by |
|   |  mouth      |
|   | daily.What  |
|   | changed:    |
|   | how much to |
|   |  take       |
|   | CONTINUE    |
|   | taking      |
|   | these       |
 
|   | medications |
|   |   *         |
|   | albuterol   |
|   | 108 (90     |
|   | Base)       |
|   | MCG/ACT     |
|   | inhalerRefi |
|   | lls:        |
|   | 0Commonly   |
|   | known as:   |
|   | PROVENTIL   |
|   | HFA;VENTOLI |
|   | N HFA *     |
|   | VENTOLIN    |
|   |  (90 |
|   |  Base)      |
|   | MCG/ACT     |
|   | inhalerRefi |
|   | lls:        |
|   | 0Generic    |
|   | drug:       |
|   | albuterol   |
|   | apixaban    |
|   | 2.5 MG      |
|   | tabletQTY:  |
|   |  60         |
|   | tabletRefil |
|   | ls:         |
|   | 0Commonly   |
|   | known as:   |
|   | ELIQUISTake |
|   |  1 tablet   |
|   | by mouth 2  |
|   | (two) times |
|   |  daily.     |
|   | aspirin 81  |
|   | MG EC       |
|   | tabletQTY:  |
|   |  30         |
|   | tabletRefil |
|   | ls:  0Take  |
|   | 1 tablet by |
|   |  mouth      |
|   | daily with  |
|   | breakfast.  |
|   | atorvastati |
|   | n 40 MG     |
|   | tabletQTY:  |
|   |  30         |
|   | tabletRefil |
|   | ls:         |
|   | 0Commonly   |
|   | known as:   |
|   | LIPITORTake |
|   |  1 tablet   |
|   | by mouth    |
|   | nightly.    |
|   | calcium     |
|   | acetate 667 |
|   |  MG         |
 
|   | capsuleRefi |
|   | lls:        |
|   | 0Commonly   |
|   | known as:   |
|   | PHOSLO      |
|   | clopidogrel |
|   |  75 MG      |
|   | tabletQTY:  |
|   |  30         |
|   | tabletRefil |
|   | ls:         |
|   | 11Commonly  |
|   | known as:   |
|   | PLAVIXTake  |
|   | 1 tablet by |
|   |  mouth      |
|   | daily.      |
|   | esomeprazol |
|   | e 20 MG     |
|   | capsuleRefi |
|   | lls:        |
|   | 0Commonly   |
|   | known as:   |
|   | NEXIUM      |
|   | HYDROcodone |
|   | -acetaminop |
|   | hen 5-325   |
|   | MG per      |
|   | tabletQTY:  |
|   |  30         |
|   | tabletRefil |
|   | ls:         |
|   | 0Commonly   |
|   | known as:   |
|   | NORCOTake 1 |
|   |  tablet by  |
|   | mouth every |
|   |  4 (four)   |
|   | hours as    |
|   | needed.     |
|   | insulin     |
|   | aspart 100  |
|   | UNIT/ML     |
|   | injectionRe |
|   | fills:      |
|   | 0Commonly   |
|   | known as:   |
|   | NOVOLOG     |
|   | insulin     |
|   | degludec    |
|   | 100 UNIT/ML |
|   |             |
|   | injectionRe |
|   | fills:      |
|   | 0Commonly   |
|   | known as:   |
|   | TRESIBA     |
|   | isosorbide  |
|   | mononitrate |
|   |  60 MG 24   |
 
|   | hr          |
|   | tabletQTY:  |
|   |  30         |
|   | tabletRefil |
|   | ls:         |
|   | 1Commonly   |
|   | known as:   |
|   | IMDURTake 1 |
|   |  tablet by  |
|   | mouth       |
|   | daily.      |
|   | loperamide  |
|   | 2 MG        |
|   | capsuleRefi |
|   | lls:        |
|   | 0Commonly   |
|   | known as:   |
|   | IMODIUM     |
|   | nitroGLYCER |
|   | IN 0.4 MG   |
|   | SL          |
|   | tabletQTY:  |
|   |  30         |
|   | tabletRefil |
|   | ls:         |
|   | 0Doctor's   |
|   | comments:   |
|   | Hold for    |
|   | SBP less    |
|   | than        |
|   | 100Commonly |
|   |  known as:  |
|   |             |
|   | NITROSTATPl |
|   | ace 1       |
|   | tablet      |
|   | under the   |
|   | tongue      |
|   | every 5     |
|   | (five)      |
|   | minutes as  |
|   | needed for  |
|   | Chest pain. |
|   |             |
|   | nortriptyli |
|   | ne 25 MG    |
|   | capsuleRefi |
|   | lls:        |
|   | 0Commonly   |
|   | known as:   |
|   | PAMELOR     |
|   | ondansetron |
|   |  4 MG       |
|   | disintegrat |
|   | ing         |
|   | tabletRefil |
|   | ls:         |
|   | 0Commonly   |
|   | known as:   |
|   | ZOFRAN-ODT  |
 
|   | oxybutynin  |
|   | 5 MG        |
|   | tabletRefil |
|   | ls:         |
|   | 0Commonly   |
|   | known as:   |
|   | DITROPAN    |
|   | prochlorper |
|   | azine 5 MG  |
|   | tabletRefil |
|   | ls:  0      |
|   | ranitidine  |
|   | 150 MG      |
|   | tabletRefil |
|   | ls:         |
|   | 0Commonly   |
|   | known as:   |
|   | ZANTAC      |
|   | rOPINIRole  |
|   | 0.25 MG     |
|   | tabletRefil |
|   | ls:         |
|   | 0Commonly   |
|   | known as:   |
|   | REQUIP      |
|   | SLOW-MAG    |
|   | 71.5-119 MG |
|   |             |
|   | TbecRefills |
|   | :  0Generic |
|   |  drug:      |
|   | Magnesium   |
|   | Cl-Calcium  |
|   | Carbonate   |
|   | Vitamin D3  |
|   | 5000 units  |
|   | CapsRefills |
|   | :  0        |
|   | Vortioxetin |
|   | e HBr 10 MG |
|   |             |
|   | TabsRefills |
|   | :  0  *     |
|   | This list   |
|   | has 2       |
|   | medication( |
|   | s) that are |
|   |  the same   |
|   | as other    |
|   | medications |
|   |  prescribed |
|   |  for you.   |
|   | Read the    |
|   | directions  |
|   | carefully,  |
|   | and ask     |
|   | your doctor |
|   |  or other   |
|   | care        |
|   | provider to |
 
|   |  review     |
|   | them with   |
|   | you.    You |
|   |  might also |
|   |  be taking  |
|   | other       |
|   | medications |
|   |  not listed |
|   |  above. If  |
|   | you have    |
|   | questions   |
|   | about any   |
|   | of your     |
|   | other       |
|   | medications |
|   | , talk to   |
|   | the person  |
|   | who         |
|   | prescribed  |
|   | them or     |
|   | your        |
|   | Primary     |
|   | Care        |
|   | Provider.   |
|   |   STOP      |
|   | taking      |
|   | these       |
|   | medications |
|   |             |
|   | CefTAZidime |
|   |  and        |
|   | Dextrose    |
|   | 2-5         |
|   | GM-%(50ML)  |
|   | Solr        |
|   | furosemide  |
|   | 20 MG       |
|   | tabletCommo |
|   | nly known   |
|   | as:  LASIX  |
|   | vancomycin  |
|   | 1000 mg     |
|   | injectionCo |
|   | mmonly      |
|   | known as:   |
|   | VANCOCIN    |
|   | Where to    |
|   | Get Your    |
|   | Medications |
|   |   You can   |
|   | get these   |
|   | medications |
|   |  from any   |
|   | pharmacy    |
|   | Bring a     |
|   | paper       |
|   | prescriptio |
|   | n for each  |
|   | of these    |
|   | medications |
 
|   |             |
|   | carvedilol  |
|   | 12.5 MG     |
|   | tablet      |
|   | LORazepam 1 |
|   |  MG         |
|   | tablet      |
|   | nitroGLYCER |
|   | IN 0.4 MG   |
|   | SL tablet   |
|   | Discharge   |
|   | took over   |
|   | 30          |
|   | minutes, to |
|   |  include    |
|   | final       |
|   | examination |
|   | ,           |
|   | discussion  |
|   | of          |
|   | admission,  |
|   | and         |
|   | preparation |
|   |  of         |
|   | prescriptio |
|   | ns,         |
|   | instruction |
|   | s for       |
|   | on-going    |
|   | care,       |
|   | follow-up   |
|   | and         |
|   | documentati |
|   | on of       |
|   | discharge   |
|   | summary.Christian |
|   | alexandra Ferrara,  |
|   | MD2019 |
+---+-------------+
 
 
 
+--------+-------------+---+----------------------+----------------------+
| 01/15/ | Documentati |   |   Rl         | Other (Diagnosis     |
|    | on Only     |   | CHELSEY Hays         | request sent to      |
|        |             |   |                      | [facility] and           |
|        |             |   |                      | confirmation)        |
+--------+-------------+---+----------------------+----------------------+
| / | Documentati |   |   João Asher MD  | Other (December      |
2019   | on Only     |   |                      | [facility] |
|        |             |   |                      |  Dialysis Rounding   |
|        |             |   |                      | Note)                |
+--------+-------------+---+----------------------+----------------------+
| 01/10/ | Office      |   |   Srinivasan Abdi,  | S/P amputation of    |
|    | Visit       |   | DPM                  | foot, left (HCC)     |
|        |             |   |                      | (Primary Dx); Type 2 |
|        |             |   |                      |  diabetes mellitus   |
|        |             |   |                      | with chronic kidney  |
|        |             |   |                      | disease on chronic   |
|        |             |   |                      | dialysis, with       |
 
|        |             |   |                      | long-term current    |
|        |             |   |                      | use of insulin (HCC) |
+--------+-------------+---+----------------------+----------------------+
 from Last 3 Months
 
 Immunizations
 
 
+----------------------+-------------------------------------------+----------+
| Name                 | Dates Previously Given                    | Next Due |
+----------------------+-------------------------------------------+----------+
| Hepatitis B Adult    | 2016                                |          |
+----------------------+-------------------------------------------+----------+
| INFLUENZA PF,        | 2018 (Deferred:  - Pt states she    |          |
| QUADRIVALENT         | already had flu shot at dialysis clinic.  |          |
| (PED/ADOL/ADULT)     | Refused vax in hospital)                  |          |
+----------------------+-------------------------------------------+----------+
| Influenza, Trivalent | 2016                                |          |
|  W/Preservative      |                                           |          |
+----------------------+-------------------------------------------+----------+
| Pneumococcal         | 2012                                |          |
| Polysaccharide       |                                           |          |
| 23-valent            |                                           |          |
+----------------------+-------------------------------------------+----------+
 
 
 
 Family History
 
 
+------------------+-----------+------+----------+
| Medical History  | Relation  | Name | Comments |
+------------------+-----------+------+----------+
| High cholesterol | Father    | pvd  |          |
+------------------+-----------+------+----------+
| Hypertension     | Father    | pvd  |          |
+------------------+-----------+------+----------+
| Diabetes         | Paternal  |      |          |
|                  | Grandmoth |      |          |
|                  | er        |      |          |
+------------------+-----------+------+----------+
| Kidney disease   | Neg Hx    |      |          |
+------------------+-----------+------+----------+
| Malig hypertherm | Neg Hx    |      |          |
+------------------+-----------+------+----------+
 
 
 
+----------------------+----------+----------+----------+
| Relation             | Name     | Status   | Comments |
+----------------------+----------+----------+----------+
| Father               | pvd      |  |          |
+----------------------+----------+----------+----------+
| Mother               | dementia |  |          |
+----------------------+----------+----------+----------+
| Paternal Grandmother |          |          |          |
+----------------------+----------+----------+----------+
 
 
 
 
 Social History
 
 
+---------------+------------+-----------+--------+------------------+
| Tobacco Use   | Types      | Packs/Day | Years  | Date             |
|               |            |           | Used   |                  |
+---------------+------------+-----------+--------+------------------+
| Former Smoker | Cigarettes | 1         | 30     | Quit: 2004 |
+---------------+------------+-----------+--------+------------------+
 
 
 
+---------------------+---+---+---+
| Smokeless Tobacco:  |   |   |   |
| Never Used          |   |   |   |
+---------------------+---+---+---+
 
 
 
+-----------------------------------------+
| Tobacco Cessation: Counseling Given: No |
| Comments: quite smoking 2004            |
+-----------------------------------------+
 
 
 
+-------------+-----------+---------+----------+
| Alcohol Use | Drinks/We | oz/Week | Comments |
|             | ek        |         |          |
+-------------+-----------+---------+----------+
| No          |           |         |          |
+-------------+-----------+---------+----------+
 
 
 
+------------------+---------------+
| Sex Assigned at  | Date Recorded |
| Birth            |               |
+------------------+---------------+
| Not on file      |               |
+------------------+---------------+
 
 
 
 Last Filed Vital Signs
 
 
+-------------------+---------------------+-------------------------+
| Vital Sign        | Reading             | Time Taken              |
+-------------------+---------------------+-------------------------+
| Blood Pressure    | 109/52              | 2019 11:53 AM PST |
+-------------------+---------------------+-------------------------+
| Pulse             | 71                  | 2019 11:53 AM PST |
+-------------------+---------------------+-------------------------+
| Temperature       | 36.8   C (98.2   F) | 2019  2:40 PM PST |
+-------------------+---------------------+-------------------------+
| Respiratory Rate  | 18                  | 02/15/2019 11:30 AM PST |
+-------------------+---------------------+-------------------------+
| Oxygen Saturation | 96%                 | 2019 11:53 AM PST |
+-------------------+---------------------+-------------------------+
 
| Inhaled Oxygen    | -                   | -                       |
| Concentration     |                     |                         |
+-------------------+---------------------+-------------------------+
| Weight            | 65.3 kg (144 lb)    | 2019 10:29 PM PST |
+-------------------+---------------------+-------------------------+
| Height            | 177.8 cm (5' 10")   | 2019 10:29 PM PST |
+-------------------+---------------------+-------------------------+
| Body Mass Index   | 20.66               | 2019 10:29 PM PST |
+-------------------+---------------------+-------------------------+
 
 
 
 Plan of Treatment
 
 
+--------+---------+-----------+----------------------+-------------+
| Date   | Type    | Specialty | Care Team            | Description |
+--------+---------+-----------+----------------------+-------------+
| 04/10/ | Office  |           |   Srinivasan Abdi,  |             |
| 2019   | Visit   |           | DPM  [address], |             |
|        |         |           |  [address]  [facility]  |             |
|        |         |           | [place] [address]             |             |
|        |         |           | [phone]         |             |
|        |         |           | [phone] (Fax)   |             |
+--------+---------+-----------+----------------------+-------------+
 
 
 
+----------------------+-----------+---------------------------------+----------+
| Health Maintenance   | Due Date  | Last Done                       | Comments |
+----------------------+-----------+---------------------------------+----------+
| Diabetic Eye Exam    |  |                                 |          |
|                      | 9         |                                 |          |
+----------------------+-----------+---------------------------------+----------+
| Vaccine:             |  |                                 |          |
| Dtap/Tdap/Td (1 -    | 8         |                                 |          |
| Tdap)                |           |                                 |          |
+----------------------+-----------+---------------------------------+----------+
| Breast Cancer        |  |                                 |          |
| Screening            | 9         |                                 |          |
| (Mammogram)          |           |                                 |          |
+----------------------+-----------+---------------------------------+----------+
| Colon Cancer         |  |                                 |          |
| Screening            | 9         |                                 |          |
| (Colonoscopy)        |           |                                 |          |
+----------------------+-----------+---------------------------------+----------+
| Vaccine: Zoster (1   |  |                                 |          |
| of 2)                | 9         |                                 |          |
+----------------------+-----------+---------------------------------+----------+
| DEXA SCAN SCREENING  |  |                                 |          |
|                      | 4         |                                 |          |
+----------------------+-----------+---------------------------------+----------+
| Vaccine:             |  | 2012                      |          |
| Pneumococcal 65+     | 4         |                                 |          |
| High/Highest Risk (1 |           |                                 |          |
|  of 2 - PCV13)       |           |                                 |          |
+----------------------+-----------+---------------------------------+----------+
| Lung Cancer          |  | 2017                      |          |
| Screening            | 8         |                                 |          |
+----------------------+-----------+---------------------------------+----------+
 
| Hemoglobin A1c       |  | 2018, 2018,         |          |
|                      | 9         | 2017, Additional history  |          |
|                      |           | exists                          |          |
+----------------------+-----------+---------------------------------+----------+
| Vaccine: Influenza   |  | 2016                      |          |
| (Season Ended)       | 9         |                                 |          |
+----------------------+-----------+---------------------------------+----------+
| Diabetic Foot Exam   |  | 2018, 2017          |          |
|                      | 9         |                                 |          |
+----------------------+-----------+---------------------------------+----------+
 
 
 
 Implants
 
 
+-------------------------------+-------+--------+-------------+--------+--------+--------+
| Implanted                     | Type  | Area   | Manufacture | Device | Expira | Model  |
|                               |       |        | r           |        | tion   | /      |
|                               |       |        |             | Identi | Date   | Serial |
|                               |       |        |             | fier   |        |  / Lot |
+-------------------------------+-------+--------+-------------+--------+--------+--------+
| Express                       | Stent | Arteri | BOSTON      |        |        | RENAL  |
| Sd-2017Implanted: Qty: 1 |       | al     | SCIENTIFIC  |        |        | /BILIA |
|  on 2017 by Kirt Mclaughlin, |       |        | MAURICE - [employer] |        |        | RY /   |
|  MD                           |       |        |             |        |        | / |
|                               |       |        |             |        |        | 046    |
+-------------------------------+-------+--------+-------------+--------+--------+--------+
| Express                       | Stent | Left:  | [employer]OSTON      |        |        | [id number] |
| Stent-2017Implanted: Qty: |       | Arteri | MEDICAL     |        |        | [id number] |
|  1 on 2017 by Kirt Mclaughlin  |       | al     | PRODUCTS    |        |        | 130    |
| MD GRAHAM                         |       |        |             |        |        | /[id number] |
|                               |       |        |             |        |        | [id number] |
|                               |       |        |             |        |        | 694    |
|                               |       |        |             |        |        | /81486 |
|                               |       |        |             |        |        | 695    |
+-------------------------------+-------+--------+-------------+--------+--------+--------+
| Synergy Georgi 3.5 X             | Stent | Corona | BOSTON      |        | / | [id number] |
| 16-2017Implanted: Qty: 1  |       | ry     | SCIENTIFIC  |        | 2018   | [id number] |
| on 2017 by Cooper,      |       |        |             |        |        | 50 /   |
| Marcos BRADSHAW MD               |       |        |             |        |        | / |
|                               |       |        |             |        |        | 346    |
+-------------------------------+-------+--------+-------------+--------+--------+--------+
| Synergy Georgi 2.25 X            | Stent | Corona | [employer]      |        | / | [id number] |
| 16-2017Implanted: Qty: 1  |       | ry     | SCIENTIFIC  |        |    | [id number] |
| on 2017 by Cooper,      |       |        |             |        |        | 20 /   |
| Marcos BRADSHAW MD               |       |        |             |        |        | / |
|                               |       |        |             |        |        | 165    |
+-------------------------------+-------+--------+-------------+--------+--------+--------+
| Synergy Georgi 2.25 X            | Stent | Corona | [employer]      |        | / | [id number] |
| 8-2017Implanted: Qty: 1   |       | ry     | SCIENTIFIC  |        |    | [id number] |
| on 2017 by Cooper,      |       |        |             |        |        | 20 /   |
| Marcos BRADSHAW MD               |       |        |             |        |        | / |
|                               |       |        |             |        |        | 438    |
+-------------------------------+-------+--------+-------------+--------+--------+--------+
| Epic                          | Stent | Right: | [employer]ON      |        | / | [id number] |
| Vascular-2019Implanted:  |       |        | SCIENTIFIC  |        |    | 2000 |
| Qty: 1 on 2019 by Arnoldo,  |       | Arteri |             |        |        | 020    |
| Kirt STEVENSON MD                     |       | al     |             |        |        | /N/A   |
|                               |       |        |             |        |        | / |
 
|                               |       |        |             |        |        | 135    |
+-------------------------------+-------+--------+-------------+--------+--------+--------+
| Supercable Iso Elastic        |       | Left:  | MEDACTA     |        | / |  |
| Cerclage System   1.5 Mm      |       | Femur  |             |        |    | -1010  |
| PolymerImplanted: Qty: 1 on   |       |        |             |        |        | /      |
| 2016 by Black,          |       |        |             |        |        | / |
| MD Uriel                |       |        |             |        |        | 2      |
+-------------------------------+-------+--------+-------------+--------+--------+--------+
| Imp Hip Stem Bplr Slfctr      |       | Left:  | JJHCS DEPUY |        | / | 1035-4 |
| 48x28 - [id number]:    |       | Femur  |  ORTHO -    |        | 2020   | 8-000  |
| Qty: 1 on 2016 by       |       |        | DEPU        |        |        | /      |
| Uriel Roa MD         |       |        |             |        |        | /[id number] |
|                               |       |        |             |        |        | 3      |
+-------------------------------+-------+--------+-------------+--------+--------+--------+
| Corail Femoral StemImplanted: |       | Left:  | DEPPAULINE       |        | / | [id number] |
|  Qty: 1 on 2016 by      |       | Femur  |             |        | 2020   | 3 / /  |
| Uriel Roa MD         |       |        |             |        |        |        |
+-------------------------------+-------+--------+-------------+--------+--------+--------+
| Imp Hip Fem Hd Artc 28mm Pls5 |       | Left:  | JJHCS DEPUY |        | / | 1365-1 |
|  - [id number]: Qty: 1  |       | Femur  |  ORTHO -    |        | 2020   | 2-000  |
| on 2016 by Black,       |       |        | DEPU        |        |        | /      |
| MD Uriel                |       |        |             |        |        | / |
|                               |       |        |             |        |        | 1034   |
+-------------------------------+-------+--------+-------------+--------+--------+--------+
| Xgrft Vascu-Guard Ptch 0.8x8  |       |        | HOOKER      |        | / | VG-010 |
| - SnaImplanted: Qty: 1 on     |       |        | BIOSCIENCE  |        |    | 8N /   |
| 10/25/2016 by Kirt Mclaughlin MD  |       |        | - [facility]      |        |        | /[id number] |
|                               |       |        |             |        |        |  |
|                               |       |        |             |        |        | [id number]   |
+-------------------------------+-------+--------+-------------+--------+--------+--------+
 
 
 
 Procedures
 
 
+----------------------+--------+-------------+----------------------+----------------------
+
| Procedure Name       | Priori | Date/Time   | Associated Diagnosis | Comments             
|
|                      | ty     |             |                      |                      
|
+----------------------+--------+-------------+----------------------+----------------------
+
| XR FOOT RIGHT        | Routin | 2019  |   Toe pain, right    |   Results for this   
|
|                      | e      |  2:58 PM    |                      | procedure are in the 
|
|                      |        | PST         |                      |  results section.    
|
+----------------------+--------+-------------+----------------------+----------------------
+
| XR FOOT LEFT         | Routin | 2019  |   Post-operative     |   Results for this   
|
|                      | e      |  2:58 PM    | state                | procedure are in the 
|
|                      |        | PST         |                      |  results section.    
|
+----------------------+--------+-------------+----------------------+----------------------
+
 
| POCT GLUCOSE         | Routin | 02/15/2019  |                      |   Results for this   
|
|                      | e      | 11:36 AM    |                      | procedure are in the 
|
|                      |        | PST         |                      |  results section.    
|
+----------------------+--------+-------------+----------------------+----------------------
+
| POCT GLUCOSE         | Routin | 02/15/2019  |                      |   Results for this   
|
|                      | e      |  6:01 AM    |                      | procedure are in the 
|
|                      |        | PST         |                      |  results section.    
|
+----------------------+--------+-------------+----------------------+----------------------
+
| POCT GLUCOSE         | Routin | 2019  |                      |   Results for this   
|
|                      | e      | 10:32 PM    |                      | procedure are in the 
|
|                      |        | PST         |                      |  results section.    
|
+----------------------+--------+-------------+----------------------+----------------------
+
| [facility] SEPTIC LACTIC   | STAT   | 2019  |                      |   Results for this   
|
| ACID                 |        | 10:12 PM    |                      | procedure are in the 
|
|                      |        | PST         |                      |  results section.    
|
+----------------------+--------+-------------+----------------------+----------------------
+
| [facility] SEPT[facility]ACT[facility]   | STAT   | 2019  |                      |   Results for this   
|
| ACID                 |        |  7:22 PM    |                      | procedure are in the 
|
|                      |        | PST         |                      |  results section.    
|
+----------------------+--------+-------------+----------------------+----------------------
+
| TSH                  | STAT   | 2019  |                      |   Results for this   
|
|                      |        |  5:39 PM    |                      | procedure are in the 
|
|                      |        | PST         |                      |  results section.    
|
+----------------------+--------+-------------+----------------------+----------------------
+
| FOLATE               | Add-On | 2019  |                      |   Results for this   
|
|                      |        |  5:39 PM    |                      | procedure are in the 
|
|                      |        | PST         |                      |  results section.    
|
+----------------------+--------+-------------+----------------------+----------------------
+
| VITAMIN B12          | Add-On | 2019  |                      |   Results for this   
|
|                      |        |  5:39 PM    |                      | procedure are in the 
|
 
|                      |        | PST         |                      |  results section.    
|
+----------------------+--------+-------------+----------------------+----------------------
+
| SEDIMENTATION RATE,  | STAT   | 2019  |                      |   Results for this   
|
| AUTOMATED            |        |  5:39 PM    |                      | procedure are in the 
|
|                      |        | PST         |                      |  results section.    
|
+----------------------+--------+-------------+----------------------+----------------------
+
| COMPREHENSIVE        | STAT   | 2019  |                      |   Results for this   
|
| METABOLIC PANEL      |        |  5:39 PM    |                      | procedure are in the 
|
|                      |        | PST         |                      |  results section.    
|
+----------------------+--------+-------------+----------------------+----------------------
+
| CBC W/AUTO DIFF      | STAT   | 2019  |                      |   Results for this   
|
| (REFLEX TO MANUAL)   |        |  5:39 PM    |                      | procedure are in the 
|
|                      |        | PST         |                      |  results section.    
|
+----------------------+--------+-------------+----------------------+----------------------
+
| C-REACTIVE PROTEIN   | STAT   | 2019  |                      |   Results for this   
|
|                      |        |  5:39 PM    |                      | procedure are in the 
|
|                      |        | PST         |                      |  results section.    
|
+----------------------+--------+-------------+----------------------+----------------------
+
| BLOOD CULTURE, SET 2 | STAT   | 2019  |                      |   Results for this   
|
|                      |        |  5:39 PM    |                      | procedure are in the 
|
|                      |        | PST         |                      |  results section.    
|
+----------------------+--------+-------------+----------------------+----------------------
+
| XR TOES RIGHT        | ASAP   | 2019  |                      |   Results for this   
|
|                      |        |  5:08 PM    |                      | procedure are in the 
|
|                      |        | PST         |                      |  results section.    
|
+----------------------+--------+-------------+----------------------+----------------------
+
| [facility] SEPT[facility]ACT[facility]   | STAT   | 2019  |                      |   Results for this   
|
| ACID                 |        |  4:55 PM    |                      | procedure are in the 
|
|                      |        | PST         |                      |  results section.    
|
+----------------------+--------+-------------+----------------------+----------------------
+
 
| BLOOD CULTURE, SET 1 | STAT   | 2019  |                      |   Results for this   
|
|                      |        |  4:55 PM    |                      | procedure are in the 
|
|                      |        | PST         |                      |  results section.    
|
+----------------------+--------+-------------+----------------------+----------------------
+
| ED INFORMATION       | Routin | 2019  |                      |   Results for this   
|
| EXCHANGE             | e      |  3:21 PM    |                      | procedure are in the 
|
|                      |        | PST         |                      |  results section.    
|
+----------------------+--------+-------------+----------------------+----------------------
+
| ED INFORMATION       | Routin | 2019  |                      |   Results for this   
|
| EXCHANGE             | e      |  3:58 PM    |                      | procedure are in the 
|
|                      |        | PST         |                      |  results section.    
|
+----------------------+--------+-------------+----------------------+----------------------
+
| POCT GLUCOSE         | Routin | 2019  |                      |   Results for this   
|
|                      | e      | 12:10 PM    |                      | procedure are in the 
|
|                      |        | PST         |                      |  results section.    
|
+----------------------+--------+-------------+----------------------+----------------------
+
| POCT GLUCOSE         | Routin | 2019  |                      |   Results for this   
|
|                      | e      |  5:55 AM    |                      | procedure are in the 
|
|                      |        | PST         |                      |  results section.    
|
+----------------------+--------+-------------+----------------------+----------------------
+
| PHOSPHOROUS          | Routin | 2019  |                      |   Results for this   
|
|                      | e      |  5:38 AM    |                      | procedure are in the 
|
|                      |        | PST         |                      |  results section.    
|
+----------------------+--------+-------------+----------------------+----------------------
+
| MAGNESIUM            | Routin | 2019  |                      |   Results for this   
|
|                      | e      |  5:38 AM    |                      | procedure are in the 
|
|                      |        | PST         |                      |  results section.    
|
+----------------------+--------+-------------+----------------------+----------------------
+
| BASIC METABOLIC      | Routin | 2019  |                      |   Results for this   
|
| PANEL                | e      |  5:38 AM    |                      | procedure are in the 
|
 
|                      |        | PST         |                      |  results section.    
|
+----------------------+--------+-------------+----------------------+----------------------
+
| CBC W/AUTO DIFF      | Routin | 2019  |                      |   Results for this   
|
| (REFLEX TO MANUAL)   | e      |  5:38 AM    |                      | procedure are in the 
|
|                      |        | PST         |                      |  results section.    
|
+----------------------+--------+-------------+----------------------+----------------------
+
| POCT GLUCOSE         | Routin | 2019  |                      |   Results for this   
|
|                      | e      |  9:29 PM    |                      | procedure are in the 
|
|                      |        | PST         |                      |  results section.    
|
+----------------------+--------+-------------+----------------------+----------------------
+
| POCT GLUCOSE         | Routin | 2019  |                      |   Results for this   
|
|                      | e      |  4:06 PM    |                      | procedure are in the 
|
|                      |        | PST         |                      |  results section.    
|
+----------------------+--------+-------------+----------------------+----------------------
+
| POCT GLUCOSE         | Routin | 2019  |                      |   Results for this   
|
|                      | e      | 12:13 PM    |                      | procedure are in the 
|
|                      |        | PST         |                      |  results section.    
|
+----------------------+--------+-------------+----------------------+----------------------
+
| EKG STANDARD 12 LEAD | Routin | 2019  |                      |   Results for this   
|
|                      | e      |  6:18 AM    |                      | procedure are in the 
|
|                      |        | PST         |                      |  results section.    
|
+----------------------+--------+-------------+----------------------+----------------------
+
| POCT GLUCOSE         | Routin | 2019  |                      |   Results for this   
|
|                      | e      |  5:21 AM    |                      | procedure are in the 
|
|                      |        | PST         |                      |  results section.    
|
+----------------------+--------+-------------+----------------------+----------------------
+
| BASIC METABOLIC      | Routin | 2019  |                      |   Results for this   
|
| PANEL                | e - AM |  4:53 AM    |                      | procedure are in the 
|
|                      |        | PST         |                      |  results section.    
|
+----------------------+--------+-------------+----------------------+----------------------
+
 
| CBC W/AUTO DIFF      | Routin | 2019  |                      |   Results for this   
|
| (REFLEX TO MANUAL)   | e - AM |  4:53 AM    |                      | procedure are in the 
|
|                      |        | PST         |                      |  results section.    
|
+----------------------+--------+-------------+----------------------+----------------------
+
| POCT GLUCOSE         | Routin | 2019  |                      |   Results for this   
|
|                      | e      |  9:00 PM    |                      | procedure are in the 
|
|                      |        | PST         |                      |  results section.    
|
+----------------------+--------+-------------+----------------------+----------------------
+
| IR STENT FEMORAL     | Routin | 2019  |                      |   Results for this   
|
| POPLITEAL            | e      |  6:45 PM    |                      | procedure are in the 
|
|                      |        | PST         |                      |  results section.    
|
+----------------------+--------+-------------+----------------------+----------------------
+
| IR AORTAGRAM         | Routin | 2019  |                      |   Results for this   
|
| ABDOMINAL            | e      |  6:45 PM    |                      | procedure are in the 
|
| SERIALOGRAM          |        | PST         |                      |  results section.    
|
+----------------------+--------+-------------+----------------------+----------------------
+
| IR ANGIOGRAM         | Routin | 2019  |                      |   Results for this   
|
| EXTREMITY RIGHT      | e      |  6:45 PM    |                      | procedure are in the 
|
|                      |        | PST         |                      |  results section.    
|
+----------------------+--------+-------------+----------------------+----------------------
+
| BLOOD CULTURE, SET 2 | Timed  | 2019  |                      |   Results for this   
|
|                      |        |  6:05 PM    |                      | procedure are in the 
|
|                      |        | PST         |                      |  results section.    
|
+----------------------+--------+-------------+----------------------+----------------------
+
| IR GUIDANCE VASCULAR | Routin | 2019  |                      |   Results for this   
|
|  ACCESS US           | e      |  5:40 PM    |                      | procedure are in the 
|
|                      |        | PST         |                      |  results section.    
|
+----------------------+--------+-------------+----------------------+----------------------
+
| C-REACTIVE PROTEIN   | STAT   | 2019  |                      |   Results for this   
|
|                      |        |  3:43 PM    |                      | procedure are in the 
|
 
|                      |        | PST         |                      |  results section.    
|
+----------------------+--------+-------------+----------------------+----------------------
+
| SEDIMENTATION RATE,  | STAT   | 2019  |                      |   Results for this   
|
| AUTOMATED            |        |  3:43 PM    |                      | procedure are in the 
|
|                      |        | PST         |                      |  results section.    
|
+----------------------+--------+-------------+----------------------+----------------------
+
| CK                   | STAT   | 2019  |                      |   Results for this   
|
|                      |        |  3:43 PM    |                      | procedure are in the 
|
|                      |        | PST         |                      |  results section.    
|
+----------------------+--------+-------------+----------------------+----------------------
+
| COMPREHENSIVE        | STAT   | 2019  |                      |   Results for this   
|
| METABOLIC PANEL      |        |  3:43 PM    |                      | procedure are in the 
|
|                      |        | PST         |                      |  results section.    
|
+----------------------+--------+-------------+----------------------+----------------------
+
| CBC W/MANUAL DIFF    | STAT   | 2019  |                      |   Results for this   
|
|                      |        |  3:43 PM    |                      | procedure are in the 
|
|                      |        | PST         |                      |  results section.    
|
+----------------------+--------+-------------+----------------------+----------------------
+
| BLOOD CULTURE, SET 1 | Timed  | 2019  |                      |   Results for this   
|
|                      |        |  3:43 PM    |                      | procedure are in the 
|
|                      |        | PST         |                      |  results section.    
|
+----------------------+--------+-------------+----------------------+----------------------
+
| US LOWER EXTREMITY   | STAT   | 2019  |                      |   Results for this   
|
| ARTERIAL RIGHT       |        |  2:23 PM    |                      | procedure are in the 
|
|                      |        | PST         |                      |  results section.    
|
+----------------------+--------+-------------+----------------------+----------------------
+
| ED INFORMATION       | Routin | 2019  |                      |   Results for this   
|
| EXCHANGE             | e      |  1:03 PM    |                      | procedure are in the 
|
|                      |        | PST         |                      |  results section.    
|
+----------------------+--------+-------------+----------------------+----------------------
+
 
| POCT GLUCOSE         | Routin | 2019  |                      |   Results for this   
|
|                      | e      | 11:45 AM    |                      | procedure are in the 
|
|                      |        | PST         |                      |  results section.    
|
+----------------------+--------+-------------+----------------------+----------------------
+
| CBC W/MANUAL DIFF    | Routin | 2019  |                      |   Results for this   
|
|                      | e      |  5:54 AM    |                      | procedure are in the 
|
|                      |        | PST         |                      |  results section.    
|
+----------------------+--------+-------------+----------------------+----------------------
+
| SEDIMENTATION RATE,  | Routin | 2019  |                      |   Results for this   
|
| AUTOMATED            | e - AM |  5:54 AM    |                      | procedure are in the 
|
|                      |        | PST         |                      |  results section.    
|
+----------------------+--------+-------------+----------------------+----------------------
+
| C-REACTIVE PROTEIN   | Routin | 2019  |                      |   Results for this   
|
|                      | e - AM |  5:54 AM    |                      | procedure are in the 
|
|                      |        | PST         |                      |  results section.    
|
+----------------------+--------+-------------+----------------------+----------------------
+
| COMPREHENSIVE        | Routin | 2019  |                      |   Results for this   
|
| METABOLIC PANEL      | e - AM |  5:54 AM    |                      | procedure are in the 
|
|                      |        | PST         |                      |  results section.    
|
+----------------------+--------+-------------+----------------------+----------------------
+
| POCT GLUCOSE         | Routin | 2019  |                      |   Results for this   
|
|                      | e      |  5:22 AM    |                      | procedure are in the 
|
|                      |        | PST         |                      |  results section.    
|
+----------------------+--------+-------------+----------------------+----------------------
+
| POCT GLUCOSE         | Routin | 2019  |                      |   Results for this   
|
|                      | e      |  9:02 PM    |                      | procedure are in the 
|
|                      |        | PST         |                      |  results section.    
|
+----------------------+--------+-------------+----------------------+----------------------
+
| POCT GLUCOSE         | Routin | 2019  |                      |   Results for this   
|
|                      | e      |  4:16 PM    |                      | procedure are in the 
|
 
|                      |        | PST         |                      |  results section.    
|
+----------------------+--------+-------------+----------------------+----------------------
+
| POCT GLUCOSE         | Routin | 2019  |                      |   Results for this   
|
|                      | e      | 12:17 PM    |                      | procedure are in the 
|
|                      |        | PST         |                      |  results section.    
|
+----------------------+--------+-------------+----------------------+----------------------
+
| RENAL FUNCTION PANEL | STAT   | 2019  |                      |   Results for this   
|
|                      |        |  8:47 AM    |                      | procedure are in the 
|
|                      |        | PST         |                      |  results section.    
|
+----------------------+--------+-------------+----------------------+----------------------
+
| CBC W/AUTO DIFF      | STAT   | 2019  |                      |   Results for this   
|
| (REFLEX TO MANUAL)   |        |  8:47 AM    |                      | procedure are in the 
|
|                      |        | PST         |                      |  results section.    
|
+----------------------+--------+-------------+----------------------+----------------------
+
| EKG STANDARD 12 LEAD | Routin | 2019  |                      |   Results for this   
|
|                      | e      |  5:50 AM    |                      | procedure are in the 
|
|                      |        | PST         |                      |  results section.    
|
+----------------------+--------+-------------+----------------------+----------------------
+
| POCT GLUCOSE         | Routin | 2019  |                      |   Results for this   
|
|                      | e      |  5:18 AM    |                      | procedure are in the 
|
|                      |        | PST         |                      |  results section.    
|
+----------------------+--------+-------------+----------------------+----------------------
+
| POCT GLUCOSE         | Routin | 2019  |                      |   Results for this   
|
|                      | e      | 10:42 PM    |                      | procedure are in the 
|
|                      |        | PST         |                      |  results section.    
|
+----------------------+--------+-------------+----------------------+----------------------
+
| POCT GLUCOSE         | Routin | 2019  |                      |   Results for this   
|
|                      | e      |  9:16 PM    |                      | procedure are in the 
|
|                      |        | PST         |                      |  results section.    
|
+----------------------+--------+-------------+----------------------+----------------------
+
 
| TROPONIN I           | Timed  | 2019  |                      |   Results for this   
|
|                      |        |  5:47 PM    |                      | procedure are in the 
|
|                      |        | PST         |                      |  results section.    
|
+----------------------+--------+-------------+----------------------+----------------------
+
| POCT GLUCOSE         | Routin | 2019  |                      |   Results for this   
|
|                      | e      |  4:56 PM    |                      | procedure are in the 
|
|                      |        | PST         |                      |  results section.    
|
+----------------------+--------+-------------+----------------------+----------------------
+
| EKG STANDARD 12 LEAD | STAT   | 2019  |                      |   Results for this   
|
|                      |        |  3:01 PM    |                      | procedure are in the 
|
|                      |        | PST         |                      |  results section.    
|
+----------------------+--------+-------------+----------------------+----------------------
+
| TROPONIN I           | Timed  | 2019  |                      |   Results for this   
|
|                      |        |  2:11 PM    |                      | procedure are in the 
|
|                      |        | PST         |                      |  results section.    
|
+----------------------+--------+-------------+----------------------+----------------------
+
| ED ISTAT TROPONIN    | STAT   | 2019  |                      |   Results for this   
|
|                      |        | 10:26 AM    |                      | procedure are in the 
|
|                      |        | PST         |                      |  results section.    
|
+----------------------+--------+-------------+----------------------+----------------------
+
| POC CARDIAC TROPONIN | Routin | 2019  |                      |   Results for this   
|
|                      | e      |  9:54 AM    |                      | procedure are in the 
|
|                      |        | PST         |                      |  results section.    
|
+----------------------+--------+-------------+----------------------+----------------------
+
| ED INFORMATION       | Routin | 2019  |                      |   Results for this   
|
| EXCHANGE             | e      |  9:47 AM    |                      | procedure are in the 
|
|                      |        | PST         |                      |  results section.    
|
+----------------------+--------+-------------+----------------------+----------------------
+
| EKG 12 LEAD UNIT     | Routin | 2019  |                      |   Results for this   
|
| PERFORMED            | e      |  9:44 AM    |                      | procedure are in the 
|
 
|                      |        | PST         |                      |  results section.    
|
+----------------------+--------+-------------+----------------------+----------------------
+
 from Last 3 Months
 
 Results
 X-ray foot right 3+ views (2019  2:58 PM)
 
+------------------------------------------------------------------------+--------------+
| Impressions                                                            | Performed At |
+------------------------------------------------------------------------+--------------+
|   Linear lucency/irregularity at the distal 1st phalanx, may represent |   [facility]     |
|   minimally displaced fracture.  Signed by: Oleksandr Lemus  Sign         | RADIOLOGY    |
| Date/Time: 2019 10:20 AM                                         |              |
+------------------------------------------------------------------------+--------------+
 
 
 
+------------------------------------------------------------------------+--------------+
| Narrative                                                              | Performed At |
+------------------------------------------------------------------------+--------------+
|   RIGHT FOOT THREE VIEWS  CLINICAL INFORMATION:  Possible fracture of  |   [facility]     |
| right hallux.  COMPARISON:  XR TOES RIGHT (2019); XR FOOT LEFT    | RADIOLOGY    |
| (2018);  FINDINGS:  No acute fracture or dislocation.  Small     |              |
| calcaneal plantar enthesophyte.  Linear lucency/irregularity at the    |              |
| distal 1st phalanx, may represent  minimally displaced fracture.  Mild |              |
|  midfoot degeneration.                                                 |              |
+------------------------------------------------------------------------+--------------+
 
 
 
+-------------------------------------------------------------------------+
| Procedure Note                                                          |
+-------------------------------------------------------------------------+
|   Lauro Baldwin Results In - 2019 10:23 AM PST  RIGHT FOOT THREE VIEWS |
| CLINICAL INFORMATION:                                                   |
| Possible fracture of right hallux.                                      |
| COMPARISON:                                                             |
| XR TOES RIGHT (2019); XR FOOT LEFT (2018);                   |
| FINDINGS:                                                               |
| No acute fracture or dislocation.                                       |
| Small calcaneal plantar enthesophyte.                                   |
| Linear lucency/irregularity at the distal 1st phalanx, may represent    |
| minimally displaced fracture.                                           |
| Mild midfoot degeneration.                                              |
| IMPRESSION:                                                             |
| Linear lucency/irregularity at the distal 1st phalanx, may represent    |
| minimally displaced fracture.                                           |
| Signed by: Oleksandr Lemus                                                 |
| Sign Date/Time: 2019 10:20 AM                                     |
+-------------------------------------------------------------------------+
 
 
 
+--------------------+------------------+--------------------+--------------+
| Performing         | Address          | City/State/Zipcode | Phone Number |
| Organization       |                  |                    |              |
+--------------------+------------------+--------------------+--------------+
|   [facility] |   [address] | [place] [address] |              |
 
+--------------------+------------------+--------------------+--------------+
 X-ray foot left 3 + views (2019  2:58 PM)
 
+----------------------------------------------------------------------+--------------+
| Impressions                                                          | Performed At |
+----------------------------------------------------------------------+--------------+
|   No acute fracture.    No radiographic evidence for osteomyelitis   |   [facility]     |
| Signed by: Oleksandr Lemus Date/Time: 2019 10:21 AM         | RADIOLOGY    |
+----------------------------------------------------------------------+--------------+
 
 
 
+------------------------------------------------------------------------+--------------+
| Narrative                                                              | Performed At |
+------------------------------------------------------------------------+--------------+
|   LEFT FOOT THREE VIEWS  CLINICAL INFORMATION:  8 weeks post op,       |   [facility]     |
| forefoot amputation.  COMPARISON:  XR TOES RIGHT (2019); XR FOOT  | RADIOLOGY    |
| LEFT (2018); XR FOOT LEFT  (2018);  FINDINGS:  Amputation  |              |
| of the forefoot at the level of the proximal metacarpals.  No          |              |
| radiographic evidence for osteomyelitis.  No acute fractures.          |              |
+------------------------------------------------------------------------+--------------+
 
 
 
+------------------------------------------------------------------------+
| Procedure Note                                                         |
+------------------------------------------------------------------------+
|   Lauro Baldwin Results In - 2019 10:25 AM PST  LEFT FOOT THREE VIEWS |
| CLINICAL INFORMATION:                                                  |
| 8 weeks post op, forefoot amputation.                                  |
| COMPARISON:                                                            |
| XR TOES RIGHT (2019); XR FOOT LEFT (2018); XR FOOT LEFT     |
| (2018);                                                          |
| FINDINGS:                                                              |
| Amputation of the forefoot at the level of the proximal metacarpals.   |
| No radiographic evidence for osteomyelitis.                            |
| No acute fractures.                                                    |
| IMPRESSION:                                                            |
| No acute fracture.  No radiographic evidence for osteomyelitis         |
| Signed by: Oleksandr Lemus                                                |
| Sign Date/Time: 2019 10:21 AM                                    |
+------------------------------------------------------------------------+
 
 
 
+--------------------+------------------+--------------------+--------------+
| Performing         | Address          | City/State/Zipcode | Phone Number |
| Organization       |                  |                    |              |
+--------------------+------------------+--------------------+--------------+
|   [facility] |   [address] | [place] [address] |              |
+--------------------+------------------+--------------------+--------------+
 POCT glucose (02/15/2019 11:36 AM)Only the most recent of 19 results within the time period
 is included.
 
+--------------------+--------------------------+---------------+-----------------+
| Component          | Value                    | Ref Range     | Performed At    |
+--------------------+--------------------------+---------------+-----------------+
| GLUCOSE,POC SCREEN | 231 (H)Comment: Testing  | 65 - 99 mg/dL | [facility] |
|                    | [place] [facility]     |               |                 |
|                    | [address] [facility];[place], [place]   |               |                 |
 
|                    | [id number]                    |               |                 |
+--------------------+--------------------------+---------------+-----------------+
 
 
 
+-------------------+------------------+--------------------+--------------+
| Performing        | Address          | City/State/Zipcode | Phone Number |
| Organization      |                  |                    |              |
+-------------------+------------------+--------------------+--------------+
|   [facility] |   [address] | [place] [facility] [place] [address] |              |
+-------------------+------------------+--------------------+--------------+
 Septic Lactic Acid (2019 10:12 PM)Only the most recent of 3 results within the time krystian sparks is included.
 
+-------------+-------------------------+------------------+-----------------+
| Component   | Value                   | Ref Range        | Performed At    |
+-------------+-------------------------+------------------+-----------------+
| LACTIC ACID | 2.0Comment: Testing     | 0.4 - 2.0 mmol/L | [facility] |
|             | performed at [facility]    |                  |                 |
|             | [address] [facility] [facility];[place]  |                  |                 |
|             | [id number]                   |                  |                 |
+-------------+-------------------------+------------------+-----------------+
 
 
 
+-------------------+------------------+--------------------+--------------+
| Performing        | Address          | City/State/Zipcode | Phone Number |
| Organization      |                  |                    |              |
+-------------------+------------------+--------------------+--------------+
|   [facility] |   [address] | [place] [address] |              |
+-------------------+------------------+--------------------+--------------+
 Blood Culture Set 2 (2019  5:39 PM)Only the most recent of 2 results within the time 
period is included.
 
+----------------------+------------------------+-----------+-----------------+
| Component            | Value                  | Ref Range | Performed At    |
+----------------------+------------------------+-----------+-----------------+
| Specimen Description | BLOOD, PERIPHERAL DRAW |           | [facility]      |
|                      |                        |           | LABORATORY      |
+----------------------+------------------------+-----------+-----------------+
| SPECIAL REQUESTS     | R HAND                 |           | [facility] |
+----------------------+------------------------+-----------+-----------------+
| CULTURE              | NO GROWTH 6 DAYS       |           | [facility]      |
|                      |                        |           | LABORATORY      |
+----------------------+------------------------+-----------+-----------------+
 
 
 
+-----------------+
| Specimen        |
+-----------------+
| Blood - Blood,  |
| peripheral draw |
+-----------------+
 
 
 
+-------------------+-------------------------+---------------------+----------------+
| Performing        | Address                 | City/State/Zipcode  | Phone Number   |
| Organization      |                         |                     |                |
 
+-------------------+-------------------------+---------------------+----------------+
|   [facility]      |   [address]  | [place] [address] |   [phone] |
| LABORATORY        | [address]vd[address]                   |                     |                |
+-------------------+-------------------------+---------------------+----------------+
|   [facility] |   [address]        | [place] [address]  |                |
+-------------------+-------------------------+---------------------+----------------+
 Sedimentation Rate (ESR) (2019  5:39 PM)Only the most recent of 3 results within the 
time period is included.
 
+-----------+-------------------------+--------------+-----------------+
| Component | Value                   | Ref Range    | Performed At    |
+-----------+-------------------------+--------------+-----------------+
| ESR       | 13Comment: Testing      | 0 - 30 mm/Hr | [facility] |
|           | performed at [facility]    |              |                 |
|           | [address];[place], [place]  |              |                 |
|           | [address]                   |              |                 |
+-----------+-------------------------+--------------+-----------------+
 
 
 
+----------+
| Specimen |
+----------+
| Blood    |
+----------+
 
 
 
+-------------------+------------------+--------------------+--------------+
| Performing        | Address          | City/State/Zipcode | Phone Number |
| Organization      |                  |                    |              |
+-------------------+------------------+--------------------+--------------+
|   [facility] |   [address] | [place] [address] |              |
+-------------------+------------------+--------------------+--------------+
 CBC with differential (2019  5:39 PM)Only the most recent of 4 results within the  period is included.
 
+-------------------+------------------------+-------------------+-----------------+
| Component         | Value                  | Ref Range         | Performed At    |
+-------------------+------------------------+-------------------+-----------------+
| WBC               | 5.14                   | 3.80 - 11.00 K/uL | [facility] |
+-------------------+------------------------+-------------------+-----------------+
| RBC               | 2.91 (L)               | 3.70 - 5.10 M/uL  | [facility] |
+-------------------+------------------------+-------------------+-----------------+
| HGB               | 10.1 (L)               | 11.3 - 15.5 g/dL  | [facility] |
+-------------------+------------------------+-------------------+-----------------+
| HCT               | 30.9 (L)               | 34.0 - 46.0 %     | [facility] |
+-------------------+------------------------+-------------------+-----------------+
| MCV               | 106.1 (H)              | 80.0 - 100.0 fl   | [facility] |
+-------------------+------------------------+-------------------+-----------------+
| MCH               | 34.8 (H)               | 27.0 - 34.0 pg    | [facility] |
+-------------------+------------------------+-------------------+-----------------+
| MCHC              | 32.8                   | 32.0 - 35.5 g/dL  | [facility] |
+-------------------+------------------------+-------------------+-----------------+
| RDW SD            | 61.3 (H)               | 37 - 53 fl        | [facility] |
+-------------------+------------------------+-------------------+-----------------+
| PLT               | 145 (L)                | 150 - 400 K/uL    | [facility] |
+-------------------+------------------------+-------------------+-----------------+
| MPV               | 9.3                    | fl                | [facility] |
+-------------------+------------------------+-------------------+-----------------+
 
| DIFF TYPE         | AUTOMATED              |                   | [facility] |
+-------------------+------------------------+-------------------+-----------------+
| NEUTROPHILS       | 74.03                  | %                 | [facility] |
+-------------------+------------------------+-------------------+-----------------+
| LYMPHOCYTES       | 20.20                  | %                 | [facility] |
+-------------------+------------------------+-------------------+-----------------+
| MONOCYTES         | 5.16                   | %                 | [facility] |
+-------------------+------------------------+-------------------+-----------------+
| EOSINOPHILS       | 0.03                   | %                 | [facility] |
+-------------------+------------------------+-------------------+-----------------+
| BASOPHILS         | 0.58                   | %                 | [facility] |
+-------------------+------------------------+-------------------+-----------------+
| NEUTROPHILS ABS   | 3.81                   | 1.90 - 7.40 K/uL  | [facility] |
+-------------------+------------------------+-------------------+-----------------+
| LYMPHOCYTES ABS   | 1.04                   | 1.00 - 3.90 K/uL  | [facility] |
+-------------------+------------------------+-------------------+-----------------+
| MONOCYTES ABS     | 0.27                   | 0.00 - 0.80 K/uL  | [facility] |
+-------------------+------------------------+-------------------+-----------------+
| EOSINOPHILS ABS   | 0.00                   | 0.00 - 0.50 K/uL  | [facility] |
+-------------------+------------------------+-------------------+-----------------+
| BASOPHILS ABS     | 0.03                   | 0.00 - 0.10 K/uL  | [facility] |
+-------------------+------------------------+-------------------+-----------------+
| MORPHOLOGY        | 1+                     |                   | [facility] |
|                   | Comment:               |                   |                 |
|                   | ANISO                  |                   |                 |
|                   | 1+                     |                   |                 |
|                   | MACRO                  |                   |                 |
|                   | NORMAL PLT MORPH       |                   |                 |
|                   |                        |                   |                 |
+-------------------+------------------------+-------------------+-----------------+
| Platelet Estimate | DECREASEDComment:      |                   | [facility] |
|                   | Testing performed at   |                   |                 |
|                   | [facility] [place] [address]          |                   |                 |
|                   | Blvd;[place] [address] |                   |                 |
+-------------------+------------------------+-------------------+-----------------+
 
 
 
+----------+
| Specimen |
+----------+
| Blood    |
+----------+
 
 
 
+-------------------+------------------+--------------------+--------------+
| Performing        | Address          | City/State/Zipcode | Phone Number |
| Organization      |                  |                    |              |
+-------------------+------------------+--------------------+--------------+
|   [facility] |   [address] | [place] [address] |              |
+-------------------+------------------+--------------------+--------------+
 C-Reactive Protein (2019  5:39 PM)Only the most recent of 3 results within the time krystian sparks is included.
 
+-----------+--------------------------+------------+-----------------+
| Component | Value                    | Ref Range  | Performed At    |
+-----------+--------------------------+------------+-----------------+
| CRP       | 0.8 (H)Comment: Testing  | <0.5 mg/dL | [facility] |
|           | performed at [facility]     |            |                 |
 
|           | [address];[place]   |            |                 |
|           | [address]                    |            |                 |
+-----------+--------------------------+------------+-----------------+
 
 
 
+----------+
| Specimen |
+----------+
| Blood    |
+----------+
 
 
 
+-------------------+------------------+--------------------+--------------+
| Performing        | Address          | City/State/Zipcode | Phone Number |
| Organization      |                  |                    |              |
+-------------------+------------------+--------------------+--------------+
|   [facility] |   [address] | [place] [address] |              |
+-------------------+------------------+--------------------+--------------+
 TSH (2019  5:39 PM)
 
+-----------+-------------------------+----------------------+-----------------+
| Component | Value                   | Ref Range            | Performed At    |
+-----------+-------------------------+----------------------+-----------------+
| TSH       | 0.904Comment: Testing   | 0.450 - 5.100 uIU/mL | [facility] |
|           | performed at [facility]    |                      |                 |
|           | [address];[place], [place]  |                      |                 |
|           | [address]                   |                      |                 |
+-----------+-------------------------+----------------------+-----------------+
 
 
 
+----------+
| Specimen |
+----------+
| Blood    |
+----------+
 
 
 
+-------------------+------------------+--------------------+--------------+
| Performing        | Address          | City/State/Zipcode | Phone Number |
| Organization      |                  |                    |              |
+-------------------+------------------+--------------------+--------------+
|   [facility] |   [address] | [place] [address] |              |
+-------------------+------------------+--------------------+--------------+
 Folate (2019  5:39 PM)
 
+-----------+--------------------------+------------+--------------+
| Component | Value                    | Ref Range  | Performed At |
+-----------+--------------------------+------------+--------------+
| FOLATE    | 8.0Comment: Testing      | >5.4 ng/mL | [facility]   |
|           | performed at [facility], [address] |            | LABORATORY   |
|           |  [address] [place],        |            |              |
|           | [place]  [id number]     |            |              |
+-----------+--------------------------+------------+--------------+
 
 
 
 
+----------+
| Specimen |
+----------+
| Blood    |
+----------+
 
 
 
+---------------+-------------------------+---------------------+----------------+
| Performing    | Address                 | City/State/Zipcode  | Phone Number   |
| Organization  |                         |                     |                |
+---------------+-------------------------+---------------------+----------------+
|   [facility]  |   [address]  | [place] [address] |   [phone] |
| LABORATORY    | [address]vd[address]                   |                     |                |
+---------------+-------------------------+---------------------+----------------+
 Vitamin B12 (2019  5:39 PM)
 
+-------------+--------------------------+-------------------+--------------+
| Component   | Value                    | Ref Range         | Performed At |
+-------------+--------------------------+-------------------+--------------+
| VITAMIN B12 | 553Comment: Testing      | 254 - 1,320 pg/mL | [facility]   |
|             | performed at [facility], [address] |                   | LABORATORY   |
|             |  [address],        |                   |              |
|             | [place]  [address]     |                   |              |
+-------------+--------------------------+-------------------+--------------+
 
 
 
+----------+
| Specimen |
+----------+
| Blood    |
+----------+
 
 
 
+---------------+-------------------------+---------------------+----------------+
| Performing    | Address                 | City/State/Zipcode  | Phone Number   |
| Organization  |                         |                     |                |
+---------------+-------------------------+---------------------+----------------+
|   [facility]  |   [address]  | [place] [address] |   [phone] |
| LABORATORY    | [address]                   |                     |                |
+---------------+-------------------------+---------------------+----------------+
 Comprehensive metabolic panel (2019  5:39 PM)Only the most recent of 3 results within
 the time period is included.
 
+----------------+--------------------------+-------------------+-----------------+
| Component      | Value                    | Ref Range         | Performed At    |
+----------------+--------------------------+-------------------+-----------------+
| SODIUM         | 143                      | 135 - 145 mmol/L  | [facility] |
+----------------+--------------------------+-------------------+-----------------+
| POTASSIUM      | 4.7                      | 3.5 - 4.9 mmol/L  | [facility] |
+----------------+--------------------------+-------------------+-----------------+
| CHLORIDE       | 96 (L)                   | 99 - 109 mmol/L   | [facility] |
+----------------+--------------------------+-------------------+-----------------+
| CO2            | >40 (HH)Comment: CALLED  | 23 - 32 mmol/L    | [facility] |
|                | NURSING UNITREAD BACK    |                   |                 |
|                | RESULTS VERIFIEDCALLED   |                   |                 |
|                | TO RN IN ED AT 1830 BY   |                   |                 |
|                | [facility]                      |                   |                 |
 
|                |                          |                   |                 |
+----------------+--------------------------+-------------------+-----------------+
| ANION GAP AGAP | UNABLE TO CALCULATE      | 5 - 20 mmol/L     | [facility] |
+----------------+--------------------------+-------------------+-----------------+
| GLUCOSE        | 160 (H)                  | 65 - 99 mg/dL     | [facility] |
+----------------+--------------------------+-------------------+-----------------+
| BUN            | 9                        | 8 - 25 mg/dL      | [facility] |
+----------------+--------------------------+-------------------+-----------------+
| CREATININE     | 2.96 (H)                 | 0.50 - 1.00 mg/dL | [facility] |
+----------------+--------------------------+-------------------+-----------------+
| BUN/CREAT      | 3                        |                   | [facility] |
+----------------+--------------------------+-------------------+-----------------+
| CALCIUM        | 9.4                      | 8.5 - 10.5 mg/dL  | [facility] |
+----------------+--------------------------+-------------------+-----------------+
| TOTAL PROTEIN  | 6.7                      | 6.3 - 8.2 g/dL    | [facility] |
+----------------+--------------------------+-------------------+-----------------+
| Albumin        | 4.3                      | 3.3 - 4.8 g/dL    | [facility] |
+----------------+--------------------------+-------------------+-----------------+
| GLOBULIN       | 2.4                      | 1.3 - 4.9 g/dL    | [facility] |
+----------------+--------------------------+-------------------+-----------------+
| A/G            | 1.8                      | 1.0 - 2.4         | [facility] |
+----------------+--------------------------+-------------------+-----------------+
| TBIL           | 0.5                      | 0.1 - 1.5 mg/dL   | [facility] |
+----------------+--------------------------+-------------------+-----------------+
| ALK PHOS       | 103                      | 35 - 115 U/L      | [facility] |
+----------------+--------------------------+-------------------+-----------------+
| AST            | 54 (H)                   | 10 - 45 U/L       | [facility] |
+----------------+--------------------------+-------------------+-----------------+
| ALT            | 40                       | 10 - 65 U/L       | [facility] |
+----------------+--------------------------+-------------------+-----------------+
| EGFR           | 16 (L)Comment: GFR <60:  | >60 mL/min/1.73m2 | [facility] |
|                | CHRONIC KIDNEY DISEASE,  |                   |                 |
|                | IF FOUND OVER A 3 MONTH  |                   |                 |
|                | PERIOD.GFR <15: KIDNEY   |                   |                 |
|                | FAILURE.FOR       |                   |                 |
|                | AMERICANS, MULTIPLY THE  |                   |                 |
|                | CALCULATED GFR BY        |                   |                 |
|                | 1.210.This eGFR is       |                   |                 |
|                | calculated using the     |                   |                 |
|                | MDRD IDMS traceable      |                   |                 |
|                | equation.Testing         |                   |                 |
|                | performed at [facility] [place]     |                   |                 |
|                | [address];[place], [place]   |                   |                 |
|                | [id number]                    |                   |                 |
+----------------+--------------------------+-------------------+-----------------+
 
 
 
+----------+
| Specimen |
+----------+
| Blood    |
+----------+
 
 
 
+-------------------+------------------+--------------------+--------------+
| Performing        | Address          | City/State/Zipcode | Phone Number |
| Organization      |                  |                    |              |
+-------------------+------------------+--------------------+--------------+
 
|   [facility] |   [address] | [place] [address] |              |
+-------------------+------------------+--------------------+--------------+
 XR Toe Right 2 View (2019  5:08 PM)
 
+----------------------------------------------------------------------+--------------+
| Impressions                                                          | Performed At |
+----------------------------------------------------------------------+--------------+
|   No radiographic evidence of osteomyelitis seen.    If further      |   [facility]     |
| assessment  is warranted, consider correlation with MRI.  Potential  | RADIOLOGY    |
| acute fracture through the base of the distal phalanx.  Signed by:   |              |
| Gavin South Date/Time: 2019 5:15 PM                      |              |
+----------------------------------------------------------------------+--------------+
 
 
 
+------------------------------------------------------------------------+--------------+
| Narrative                                                              | Performed At |
+------------------------------------------------------------------------+--------------+
|   RIGHT TOE  CLINICAL INFORMATION:  Other (see comments) Pain, concern |   [facility]     |
|  for osteomyelitis.  COMPARISON:  XR FOOT LEFT (2018); XR FOOT   | RADIOLOGY    |
| LEFT (2018); XR FOOT LEFT  (2018);  FINDINGS:  Advanced     |              |
| degenerative changes identified involving the interphalangeal  joint   |              |
| of the 1st digit.    On the lateral view, there appears to be  lucency |              |
|  through the base of the phalanx, potentially reflecting an  acute     |              |
| fracture.    No erosive or destructive changes appreciated to  suggest |              |
|  osteomyelitis.                                                        |              |
+------------------------------------------------------------------------+--------------+
 
 
 
+------------------------------------------------------------------------+
| Procedure Note                                                         |
+------------------------------------------------------------------------+
|   Denny, Rad Results In - 2019  5:18 PM PST  RIGHT TOE             |
| CLINICAL INFORMATION:                                                  |
| Other (see comments) Pain, concern for osteomyelitis.                  |
| COMPARISON:                                                            |
| XR FOOT LEFT (2018); XR FOOT LEFT (2018); XR FOOT LEFT     |
| (2018);                                                           |
| FINDINGS:                                                              |
| Advanced degenerative changes identified involving the interphalangeal |
| joint of the 1st digit.  On the lateral view, there appears to be      |
| lucency through the base of the phalanx, potentially reflecting an     |
| acute fracture.  No erosive or destructive changes appreciated to      |
| suggest osteomyelitis.                                                 |
| IMPRESSION:                                                            |
| No radiographic evidence of osteomyelitis seen.  If further assessment |
| is warranted, consider correlation with MRI.                           |
| Potential acute fracture through the base of the distal phalanx.       |
| Signed by: Gavin South                                                 |
| Sign Date/Time: 2019 5:15 PM                                     |
+------------------------------------------------------------------------+
 
 
 
+--------------------+------------------+--------------------+--------------+
| Performing         | Address          | City/State/Zipcode | Phone Number |
| Organization       |                  |                    |              |
+--------------------+------------------+--------------------+--------------+
|   [facility] |   [address] | [place] [address] |              |
 
+--------------------+------------------+--------------------+--------------+
 Blood Culture Set 1 (2019  4:55 PM)Only the most recent of 2 results within the time 
period is included.
 
+----------------------+------------------+-----------+-----------------+
| Component            | Value            | Ref Range | Performed At    |
+----------------------+------------------+-----------+-----------------+
| Specimen Description | BLOOD, LINE DRAW |           | [facility]      |
|                      |                  |           | LABORATORY      |
+----------------------+------------------+-----------+-----------------+
| SPECIAL REQUESTS     | R FOREARM        |           | [facility] |
+----------------------+------------------+-----------+-----------------+
| CULTURE              | NO GROWTH 6 DAYS |           | [facility]      |
|                      |                  |           | LABORATORY      |
+----------------------+------------------+-----------+-----------------+
 
 
 
+---------------------+
| Specimen            |
+---------------------+
| Blood - Blood Line  |
| Draw                |
+---------------------+
 
 
 
+-------------------+-------------------------+---------------------+----------------+
| Performing        | Address                 | City/State/Zipcode  | Phone Number   |
| Organization      |                         |                     |                |
+-------------------+-------------------------+---------------------+----------------+
|   [facility]      |   [address]  | [place] [address] |   [phone] |
| LABORATORY        | [address]                   |                     |                |
+-------------------+-------------------------+---------------------+----------------+
|   [facility] |   [address]        | [place] [address]  |                |
+-------------------+-------------------------+---------------------+----------------+
 ED INFORMATION EXCHANGE (2019  3:21 PM)Only the most recent of 4 results within the  period is included.
 
+------------------------------------------------------------------------+--------------+
| Narrative                                                              | Performed At |
+------------------------------------------------------------------------+--------------+
|   [id number]     Criteria Met         Care        |   ED         |
| Guidelines      10 in 12     Security and Safety  No recent Security   | INFORMATION  |
| Events currently on file     ED Care Guidelines from Since[facility] -        | EXCHANGE     |
| [place]  Last Updated: 18 10:16 AM         Other Information:    |              |
| Currently being seen by [facility] in [place] for mental health        |              |
| [phone]  These are guidelines and the provider should    |              |
| exercise clinical judgment when providing care.  ED Care Guidelines    |              |
| from [facility]  Last Updated: 17 10:44 AM         Care |              |
|  Coordination:  This patient has been identified as having at          |              |
| leastEmergency Departments visits in the 12 months immediately         |              |
| preceding the date these guidelines were entered.Patient requires      |              |
| education on appropriate ED usage.Emphasize the importance of using    |              |
| outpatient   medical services for the treatment of chronic conditions. |              |
|   Please contact Community Health WorkerWillard at [phone] if   |              |
| patient is seen in ED.  These are guidelines and the provider should   |              |
| exercise clinical judgment when providing care.  These are guidelines  |              |
| and the provider should exercise clinical judgment when providing      |              |
| care.           Prescription Drug Report (12 Mo.)  PDMP query found no |              |
 
|  report.        E.D. Visit Count (12 mo.)  Facility Visits Low Acuity  |              |
|   [facility] 18 0   [facility] 2 0   [facility]   |              |
| [facility] 5 0   Total 25 0   Note: Visits indicate total |              |
|  known visits. Medicaid Low Acuity Dx are the number of primary        |              |
| diagnoses on the Medicaid's Low Acuity dx list.         Recent         |              |
| Emergency Department Visit Summary  Showing 10 most recent visits out  |              |
| of 25 in the past 12 months  Date Facility City State Type Diagnoses   |              |
| or Chief Complaint   2019 [facility]       |              |
| Emergency       2019 [facility]            |              |
| Emergency          Multiple Falls        Referral        Circulatory   |              |
| Problem      2019 [facility] [place] Emergency      |              |
|      ABD PAIN        Other chest pain      2019 [facility]         |              |
| [facility] Emergency          Foot Pain      2019 |              |
|  [facility] Emergency          Chest Pain          |              |
| Nausea        Shortness of Breath        Chest pain, unspecified       |              |
|      Elevated blood-pressure reading, without diagnosis of             |              |
| hypertension        Presence of aortocoronary bypass graft             |              |
| Other specified postprocedural states      2019 [facility]  |              |
| Health [place] Emergency          CHEST PAIN        Abnormal levels  |              |
| of other serum enzymes        Personal history of other diseases of    |              |
| the circulatory system        Chest pain, unspecified      2019 |              |
|  [facility] [place] Emergency          FISTULA BLEEDING    |              |
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
| grafts, initial encounter      Dec 22, 2018 [facility]       |              |
| [place] Emergency          CHEST PAIN        Type 2 diabetes         |              |
| mellitus with hyperglycemia        Chest pain, unspecified      Nov    |              |
| 2018 [facility] Emergency    Chief Complaint:   |              |
| SOB      2018 [facility] [place] Emergency         |              |
|     FALL        Unspecified fall, initial encounter        Dependence  |              |
| on renal dialysis        End stage renal disease            Recent     |              |
| Inpatient Visit Summary  Showing 10 most recent visits out of 11 in    |              |
| the past 12 months  Date Facility City State Type Diagnoses or Chief   |              |
| Complaint   2019 [facility] Vascular       |              |
| Surgery          Foot Pain        End stage renal disease              |              |
| Dependence on renal dialysis        Peripheral vascular disease,       |              |
| unspecified        Anemia in chronic kidney disease        Other       |              |
| disorders of plasma-protein metabolism, not elsewhere classified       |              |
|      Other specified personal risk factors, not elsewhere classified   |              |
|     Dec 27, 2018 [facility] Recovery               |              |
|     Peripheral arterial disease, osteomyelitis left foot        Other  |              |
| acute osteomyelitis, left ankle and foot        Long term (current)    |              |
| use of antibiotics        End stage renal disease        Anemia in     |              |
| chronic kidney disease      Dec 5, 2018 [facility] |              |
|  General Medicine          Peripheral vascular disease, unspecified    |              |
|      End stage renal disease        Dependence on renal dialysis       |              |
|      Long term (current) use of insulin        Other chronic           |              |
| osteomyelitis, left ankle and foot        Type 2 diabetes mellitus     |              |
| with diabetic chronic kidney disease        Essential (primary)        |              |
| hypertension      2018 [facility]          |              |
| Inpatient          Upper GIB        Other chronic osteomyelitis,       |              |
| unspecified ankle and foot        End stage renal disease        Other |              |
|  specified personal risk factors, not elsewhere classified             |              |
| Chronic systolic (congestive) heart failure        Anemia in chronic   |              |
| kidney disease        Other disorders of plasma-protein metabolism,    |              |
| not elsewhere classified        Moderate protein-calorie malnutrition  |              |
|        Other disorders of phosphorus metabolism      2018      |              |
| [facility] [place] [place] [facility]          1. Type 2      |              |
| diabetes mellitus with other specified complication        2. Urinary  |              |
| tract infection, site not specified        3. Unspecified              |              |
| protein-calorie malnutrition        4. Other chronic osteomyelitis,    |              |
 
| unspecified site        5. Hypertensive heart and chronic kidney       |              |
| disease with heart failure and with stage 5 chronic kidney disease, or |              |
|  end stage renal disease        6. Chronic diastolic (congestive)      |              |
| heart failure        7. Other osteomyelitis, ankle and foot        8.  |              |
| Type 2 diabetes mellitus with foot ulcer        9. Atherosclerotic     |              |
| heart disease of native coronary artery without angina pectoris        |              |
|  10. Chronic obstructive pulmonary disease, unspecified      ,    |              |
|  [facility]          1. Benign |              |
|  paroxysmal vertigo, unspecified ear        2. End stage renal disease |              |
|         3. Hypertensive chronic kidney disease with stage 5 chronic    |              |
| kidney disease or end stage renal disease        4. Urinary tract      |              |
| infection, site not specified        5. Hypertensive heart and chronic |              |
|  kidney disease with heart failure and with stage 5 chronic kidney     |              |
| disease, or end stage renal disease        6. Kidney transplant status |              |
|         7. Unspecified systolic (congestive) heart failure        8.   |              |
| Syncope and collapse        9. Type 2 diabetes mellitus with diabetic  |              |
| chronic kidney disease        10. Atherosclerotic heart disease of     |              |
| native coronary artery without angina pectoris      Sep 15, 2018       |              |
| [facility] [place] [place] [facility]          Acute     |              |
| ischemic heart disease, unspecified        Long term (current) use of  |              |
| insulin        Dependence on renal dialysis        End stage renal     |              |
| disease        Type 2 diabetes mellitus with diabetic chronic kidney   |              |
| disease        Essential (primary) hypertension        Anemia in       |              |
| chronic kidney disease      2018 [facility] |              |
|  Medical Surgical          0. Cough        1. Pneumonia due to         |              |
| Pseudomonas        2. End stage renal disease        3. Hypertensive   |              |
| heart and chronic kidney disease with heart failure and with stage 5   |              |
| chronic kidney disease, or end stage renal disease        4. Cachexia  |              |
|        5. Chronic obstructive pulmonary disease with acute lower       |              |
| respiratory infection        6. Type 2 diabetes mellitus with diabetic |              |
|  chronic kidney disease        7. Heart failure, unspecified        8. |              |
|  Hyperlipidemia, unspecified        9. Gastro-esophageal reflux        |              |
| disease without esophagitis      2018 [facility]       |              |
| [facility] Medical Surgical          0. Other chest pain        1.      |              |
| Gastro-esophageal reflux disease without esophagitis        2. End     |              |
| stage renal disease        3. Hypertensive heart and chronic kidney    |              |
| disease with heart failure and with stage 5 chronic kidney disease, or |              |
|  end stage renal disease        4. Chronic systolic (congestive) heart |              |
|  failure        5. Atherosclerotic heart disease of native coronary    |              |
| artery with other forms of angina pectoris        6. Type 2 diabetes   |              |
| mellitus with diabetic chronic kidney disease        7.                |              |
| Hyperlipidemia, unspecified        8. Major depressive disorder,       |              |
| single episode, unspecified        9. Chronic obstructive pulmonary    |              |
| disease, unspecified      Mar 6, 2018 [facility]     |              |
| [facility] Cardiology          Heart Failure        Need for iv access  |              |
|        Type 2 diabetes mellitus with other specified complication      |              |
|      Long term (current) use of insulin        Paroxysmal atrial       |              |
| fibrillation        Anemia in chronic kidney disease                   |              |
| Atherosclerotic heart disease of native coronary artery without angina |              |
|  pectoris        Cardiomyopathy, unspecified        End stage renal    |              |
| disease        Other specified postprocedural states            Care   |              |
| Providers  Provider PRC Type Phone Fax Service Dates   HEADINGS, SANTIAGO, |              |
|  F.N.P. Nurse Practitioner: Family     Current      Marco Antonio Martinez     |              |
| /Care Coordinator [phone]    2019 -          |              |
| Current      [facility] Primary [facility] [phone]    2019 |              |
|  - Current      [facility] Primary            |              |
| Care    [phone] Current      [facility]      |              |
| [place] [facility]     Current      MANOLO GONZALEZ Primary Care         |              |
| Current      [employer] Mental Health Provider [phone] |              |
|  [phone] Current      [employer] Case or Care Manager     Current      |              |
 
|  MIRTA Goel Case or Care Manager [phone]  |              |
| [phone] Current      Jairo Gutierrez MD Other     Current     |              |
|      [employer]  This patient has registered at the [facility]      |              |
| [facility] Emergency Department   For more information    |              |
| visit:                                                                 |              |
| [email or web address] |              |
| [id number]   The above information is provided for the sole purpose of     |              |
| patient treatment. Use of this information beyond the terms of Data    |              |
| Sharing Memorandum of Understanding and License Agreement is           |              |
| prohibited. In certain cases not all visits may be represented.        |              |
| Consult the aforementioned facilities for additional information.      |              |
| 2019 [employer] - [place], [place] -      |              |
| [email or web address]                                         |              |
+------------------------------------------------------------------------+--------------+
 
 
 
+-------------------------------------------------------------------------------------------
--------------------------------------------------------------------------------------------
--------------------+
| Procedure Note                                                                            
                                                                                            
                    |
+-------------------------------------------------------------------------------------------
--------------------------------------------------------------------------------------------
--------------------+
|   Interface, Lab - 2019  3:22 PM PST  Formatting of this note may be different      
                                                                                            
                    |
| from the original.[id number] Met  Care Guidelines  10 in     
                                                                                            
                    |
| 12Security and SafetyNo recent Security Events currently on fileED Care Guidelines from   
                                                                                            
                    |
| Sincerely  Antoinette Updated: 18 10:16 AM  Other Information:Currently being      
                                                                                            
                    |
| seen by [facility] in [place] for mental health concerns.[phone] are            
                                                                                            
                    |
| guidelines and the provider should exercise clinical judgment when providing care.ED      
                                                                                            
                    |
| Care Guidelines from [facility] Updated: 17 10:44 AM  Care             
                                                                                            
                    |
| Coordination:This patient has been identified as having at leastEmergency Departments     
                                                                                            
                    |
| visits in the 12 months immediately preceding the date these guidelines were              
                                                                                            
                    |
| entered.Patient requires education on appropriate ED usage.Emphasize the importance of    
                                                                                            
                    |
| using outpatient medical services for the treatment of chronic conditions.Please contact  
                                                                                            
                    |
|  Community Health WorkerWillard at [phone] if patient is seen in ED.These are      
 
                                                                                            
                    |
| guidelines and the provider should exercise clinical judgment when providing care.These   
                                                                                            
                    |
| are guidelines and the provider should exercise clinical judgment when providing          
                                                                                            
                    |
| care.Prescription Drug Report (12 Mo.)PDMP query found no report.E.D. Visit Count (12     
                                                                                            
                    |
| mo.)Facility Visits Low Acuity [facility] 18 0 [facility] 2 0    
                                                                                            
                    |
| [facility] 5 0 Total 25 0 Note: Visits indicate total known visits.   
                                                                                            
                    |
| Medicaid Low Acuity Dx are the number of primary diagnoses on the Medicaid's Low Acuity   
                                                                                            
                    |
| dx list.  Recent Emergency Department Visit SummaryShowing 10 most recent visits out of   
                                                                                            
                    |
| 25 in the past 12 monthsDate Facility City State Type Diagnoses or Chief Complaint Feb    
                                                                                            
                    |
| 2019 [facility] [place] Emergency   2019 [facility] [place]     
                                                                                            
                    |
| [place] Emergency    Multiple Falls    Referral    Circulatory Problem  2019     
                                                                                            
                    |
| [facility] [place] Emergency    ABD PAIN    Other chest pain  2019    
                                                                                            
                    |
| [facility] [place] Emergency    Foot Pain  2019 [facility] [place]  
                                                                                            
                    |
|  [place] Emergency    Chest Pain    Nausea    Shortness of Breath    Chest pain,        
                                                                                            
                    |
| unspecified    Elevated blood-pressure reading, without diagnosis of hypertension         
                                                                                            
                    |
| Presence of aortocoronary bypass graft    Other specified postprocedural states  ,  
                                                                                            
                    |
|   [facility] [place] Emergency    CHEST PAIN    Abnormal levels of other  
                                                                                            
                    |
|  serum enzymes    Personal history of other diseases of the circulatory system    Chest   
                                                                                            
                    |
| pain, unspecified  2019 [facility] [place] Emergency    FISTULA        
                                                                                            
                    |
| BLEEDING    Hemorrhage due to vascular prosthetic devices, implants and grafts, initial   
                                                                                            
                    |
| encounter  Dec 22, 2018 [facility] [place] Emergency    CHEST PAIN    Type 2  
 
                                                                                            
                    |
|  diabetes mellitus with hyperglycemia    Chest pain, unspecified  2018 [facility]      
                                                                                            
                    |
| [facility] [place] Emergency  Chief Complaint: SOB  2018 [facility]       
                                                                                            
                    |
| [facility] [place] Emergency    FALL    Unspecified fall, initial encounter    Dependence   
                                                                                            
                    |
| on renal dialysis    End stage renal disease  Recent Inpatient Visit SummaryShowing 10    
                                                                                            
                    |
| most recent visits out of 11 in the past 12 monthsDate Facility City State Type           
                                                                                            
                    |
| Diagnoses or Chief Complaint 2019 [facility] [place] Vascular         
                                                                                            
                    |
| Surgery    Foot Pain    End stage renal disease    Dependence on renal dialysis           
                                                                                            
                    |
| Peripheral vascular disease, unspecified    Anemia in chronic kidney disease    Other     
                                                                                            
                    |
| disorders of plasma-protein metabolism, not elsewhere classified    Other specified       
                                                                                            
                    |
| personal risk factors, not elsewhere classified  Dec 27, 2018 [facility] [place]        
                                                                                            
                    |
| [place] Recovery    Peripheral arterial disease, osteomyelitis left foot    Other       
                                                                                            
                    |
| acute osteomyelitis, left ankle and foot    Long term (current) use of antibiotics        
                                                                                            
                    |
| End stage renal disease    Anemia in chronic kidney disease  Dec 5, 2018 [facility]  
                                                                                            
                    |
|  [place] General Medicine    Peripheral vascular disease, unspecified    End       
                                                                                            
                    |
| stage renal disease    Dependence on renal dialysis    Long term (current) use of         
                                                                                            
                    |
| insulin    Other chronic osteomyelitis, left ankle and foot    Type 2 diabetes mellitus   
                                                                                            
                    |
| with diabetic chronic kidney disease    Essential (primary) hypertension  [facility]    
                                                                                            
                    |
[facility] [place] [place] [facility]    Upper GIB    Other chronic osteomyelitis,     
                                                                                            
                    |
| unspecified ankle and foot    End stage renal disease    Other specified personal risk    
                                                                                            
                    |
| factors, not elsewhere classified    Chronic systolic (congestive) heart failure          
 
                                                                                            
                    |
| Anemia in chronic kidney disease    Other disorders of plasma-protein metabolism, not     
                                                                                            
                    |
| elsewhere classified    Moderate protein-calorie malnutrition    Other disorders of       
                                                                                            
                    |
| phosphorus metabolism  2018 [facility] [place] Medical Surgical    1.  
                                                                                            
                    |
|  Type 2 diabetes mellitus with other specified complication    2. Urinary tract           
                                                                                            
                    |
| infection, site not specified    3. Unspecified protein-calorie malnutrition    4. Other  
                                                                                            
                    |
|  chronic osteomyelitis, unspecified site    5. Hypertensive heart and chronic kidney      
                                                                                            
                    |
| disease with heart failure and with stage 5 chronic kidney disease, or end stage renal    
                                                                                            
                    |
| disease    6. Chronic diastolic (congestive) heart failure    7. Other osteomyelitis,     
                                                                                            
                    |
| ankle and foot    8. Type 2 diabetes mellitus with foot ulcer    9. Atherosclerotic       
                                                                                            
                    |
| heart disease of native coronary artery without angina pectoris    10. Chronic            
                                                                                            
                    |
| obstructive pulmonary disease, unspecified  2018 [facility] [place] [place]     
                                                                                            
                    |
| Medical Surgical    1. Benign paroxysmal vertigo, unspecified ear    2. End stage renal   
                                                                                            
                    |
| disease    3. Hypertensive chronic kidney disease with stage 5 chronic kidney disease or  
                                                                                            
                    |
|  end stage renal disease    4. Urinary tract infection, site not specified    5.          
                                                                                            
                    |
| Hypertensive heart and chronic kidney disease with heart failure and with stage 5         
                                                                                            
                    |
| chronic kidney disease, or end stage renal disease    6. Kidney transplant status    7.   
                                                                                            
                    |
| Unspecified systolic (congestive) heart failure    8. Syncope and collapse    9. Type 2   
                                                                                            
                    |
| diabetes mellitus with diabetic chronic kidney disease    10. Atherosclerotic heart       
                                                                                            
                    |
| disease of native coronary artery without angina pectoris  Sep 15, 2018 [facility]    
                                                                                            
                    |
| [place] Medical Surgical    Acute ischemic heart disease, unspecified         
 
                                                                                            
                    |
| Long term (current) use of insulin    Dependence on renal dialysis    End stage renal     
                                                                                            
                    |
| disease    Type 2 diabetes mellitus with diabetic chronic kidney disease    Essential     
                                                                                            
                    |
| (primary) hypertension    Anemia in chronic kidney disease  2018 [facility]            
                                                                                            
                    |
| [place] Medical Surgical    0. Cough    1. Pneumonia due to Pseudomonas   
                                                                                            
                    |
|    2. End stage renal disease    3. Hypertensive heart and chronic kidney disease with    
                                                                                            
                    |
| heart failure and with stage 5 chronic kidney disease, or end stage renal disease    4.   
                                                                                            
                    |
| Cachexia    5. Chronic obstructive pulmonary disease with acute lower respiratory         
                                                                                            
                    |
| infection    6. Type 2 diabetes mellitus with diabetic chronic kidney disease    7.       
                                                                                            
                    |
| Heart failure, unspecified    8. Hyperlipidemia, unspecified    9. Gastro-esophageal      
                                                                                            
                    |
| reflux disease without esophagitis  2018 [place] Medical     
                                                                                            
                    |
| Surgical    0. Other chest pain    1. Gastro-esophageal reflux disease without            
                                                                                            
                    |
| esophagitis    2. End stage renal disease    3. Hypertensive heart and chronic kidney     
                                                                                            
                    |
| disease with heart failure and with stage 5 chronic kidney disease, or end stage renal    
                                                                                            
                    |
| disease    4. Chronic systolic (congestive) heart failure    5. Atherosclerotic heart     
                                                                                            
                    |
| disease of native coronary artery with other forms of angina pectoris    6. Type 2        
                                                                                            
                    |
| diabetes mellitus with diabetic chronic kidney disease    7. Hyperlipidemia, unspecified  
                                                                                            
                    |
|     8. Major depressive disorder, single episode, unspecified    9. Chronic obstructive   
                                                                                            
                    |
| pulmonary disease, unspecified  Mar 6, 2018 [facility]. [place]        
                                                                                            
                    |
| Cardiology    Heart Failure    Need for iv access    Type 2 diabetes mellitus with other  
                                                                                            
                    |
|  specified complication    Long term (current) use of insulin    Paroxysmal atrial        
 
                                                                                            
                    |
| fibrillation    Anemia in chronic kidney disease    Atherosclerotic heart disease of      
                                                                                            
                    |
| native coronary artery without angina pectoris    Cardiomyopathy, unspecified    End      
                                                                                            
                    |
| stage renal disease    Other specified postprocedural states  [facility]  
                                                                                            
                    |
|  Type Phone Fax Service Dates HEADINGS, SALEEM HENDERSONNAdryanP. Nurse Practitioner: Family           
                                                                                            
                    |
| Current  Marco Antonio Martinez /Care Coordinator [phone]  2019 -       
                                                                                            
                    |
| Current  [facility] [phone]  2019 - Current  [facility]        
                                                                                            
                    |
| [facility] Primary Care  [phone] Current  [facility]  
                                                                                            
                    |
|  [facility] Primary Care   Current  MANOLO ARROYOINDIRA Primary Care   Current  [facility],    
                                                                                            
                    |
| [facility] Mental Health Provider [phone] Current  [employer] Case or Care  
                                                                                            
                    |
|  Manager   Current  MIRTA Goel Case or Care Manager [phone]  
                                                                                            
                    |
|  [phone] Current  Jairo Gutierrez MD Other   Current  [employer]      
                                                                                            
                    |
| patient has registered at the [facility] Emergency Department For     
                                                                                            
                    |
| more information visit:                                                                   
                                                                                            
                    |
| [email or web address] The above    
                                                                                            
                    |
| information is provided for the sole purpose of patient treatment. Use of this            
                                                                                            
                    |
| information beyond the terms of Data Sharing Memorandum of Understanding and License      
                                                                                            
                    |
| Agreement is prohibited. In certain cases not all visits may be represented. Consult the  
                                                                                            
                    |
|  aforementioned facilities for additional information. 2019 [employer]            
                                                                                            
                    |
| [email or web address] [facility], [place] - [email or web address]                  
                                                                                            
                    |
|   End stage renal disease                                                                 
 
                                                                                            
                    |
|   Dependence on renal dialysis                                                            
                                                                                            
                    |
|   Long term (current) use of insulin                                                      
                                                                                            
                    |
|   Other chronic osteomyelitis, left ankle and foot                                        
                                                                                            
                    |
|   Type 2 diabetes mellitus with diabetic chronic kidney disease                           
                                                                                            
                    |
|   Essential (primary) hypertension                                                        
                                                                                            
                    |
|                                                                                           
                                                                                            
                    |
|2018 [facility]. [place] Inpatient                                      
                                                                                            
                    |
|  Upper GIB                                                                                
                                                                                            
                    |
|   Other chronic osteomyelitis, unspecified ankle and foot                                 
                                                                                            
                    |
|   End stage renal disease                                                                 
                                                                                            
                    |
|   Other specified personal risk factors, not elsewhere classified                         
                                                                                            
                    |
|   Chronic systolic (congestive) heart failure                                             
                                                                                            
                    |
|   Anemia in chronic kidney disease                                                        
                                                                                            
                    |
|   Other disorders of plasma-protein metabolism, not elsewhere classified                  
                                                                                            
                    |
|   Moderate protein-calorie malnutrition                                                   
                                                                                            
                    |
|   Other disorders of phosphorus metabolism                                                
                                                                                            
                    |
|                                                                                           
                                                                                            
                    |
|2018 [facility]. [place] [facility]                                
                                                                                            
                    |
|  1. Type 2 diabetes mellitus with other specified complication                            
                                                                                            
                    |
|   2. Urinary tract infection, site not specified                                          
 
                                                                                            
                    |
|   3. Unspecified protein-calorie malnutrition                                             
                                                                                            
                    |
|   4. Other chronic osteomyelitis, unspecified site                                        
                                                                                            
                    |
|   5. 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
onic kidney disease, or end stage renal disease                                             
                    |
|   6. Chronic diastolic (congestive) heart failure                                         
                                                                                            
                    |
|   7. Other osteomyelitis, ankle and foot                                                  
                                                                                            
                    |
|   8. Type 2 diabetes mellitus with foot ulcer                                             
                                                                                            
                    |
|   9. Atherosclerotic heart disease of native coronary artery without angina pectoris      
                                                                                            
                    |
|   10. Chronic obstructive pulmonary disease, unspecified                                  
                                                                                            
                    |
|                                                                                           
                                                                                            
                    |
|2018 [facility]. [place] [facility]                                 
                                                                                            
                    |
|  1. Benign paroxysmal vertigo, unspecified ear                                            
                                                                                            
                    |
|   2. End stage renal disease                                                              
                                                                                            
                    |
|   3. Hypertensive chronic kidney disease with stage 5 chronic kidney disease or end stage 
renal disease                                                                               
                    |
|   4. Urinary tract infection, site not specified                                          
                                                                                            
                    |
|   5. 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
onic kidney disease, or end stage renal disease                                             
                    |
|   6. Kidney transplant status                                                             
                                                                                            
                    |
|   7. Unspecified systolic (congestive) heart failure                                      
                                                                                            
                    |
|   8. Syncope and collapse                                                                 
                                                                                            
                    |
|   9. Type 2 diabetes mellitus with diabetic chronic kidney disease                        
                                                                                            
                    |
|   10. Atherosclerotic heart disease of native coronary artery without angina pectoris     
 
                                                                                            
                    |
|                                                                                           
                                                                                            
                    |
|[facility] [place] [facility]                           
                                                                                            
                    |
|  Acute ischemic heart disease, unspecified                                                
                                                                                            
                    |
|   Long term (current) use of insulin                                                      
                                                                                            
                    |
|   Dependence on renal dialysis                                                            
                                                                                            
                    |
|   End stage renal disease                                                                 
                                                                                            
                    |
|   Type 2 diabetes mellitus with diabetic chronic kidney disease                           
                                                                                            
                    |
|   Essential (primary) hypertension                                                        
                                                                                            
                    |
|   Anemia in chronic kidney disease                                                        
                                                                                            
                    |
|                                                                                           
                                                                                            
                    |
|2018 [facility]. [place] [facility]                                
                                                                                            
                    |
|  0. Cough                                                                                 
                                                                                            
                    |
|   1. Pneumonia due to Pseudomonas                                                         
                                                                                            
                    |
|   2. End stage renal disease                                                              
                                                                                            
                    |
|   3. 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
onic kidney disease, or end stage renal disease                                             
                    |
|   4. Cachexia                                                                             
                                                                                            
                    |
|   5. Chronic obstructive pulmonary disease with acute lower respiratory infection         
                                                                                            
                    |
|   6. Type 2 diabetes mellitus with diabetic chronic kidney disease                        
                                                                                            
                    |
|   7. Heart failure, unspecified                                                           
                                                                                            
                    |
|   8. Hyperlipidemia, unspecified                                                          
 
                                                                                            
                    |
|   9. Gastro-esophageal reflux disease without esophagitis                                 
                                                                                            
                    |
|                                                                                           
                                                                                            
                    |
|2018 [facility]. [place] [facility]                                 
                                                                                            
                    |
|  0. Other chest pain                                                                      
                                                                                            
                    |
|   1. Gastro-esophageal reflux disease without esophagitis                                 
                                                                                            
                    |
|   2. End stage renal disease                                                              
                                                                                            
                    |
|   3. 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
onic kidney disease, or end stage renal disease                                             
                    |
|   4. Chronic systolic (congestive) heart failure                                          
                                                                                            
                    |
|   5. 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
ctoris                                                                                      
                    |
|   6. Type 2 diabetes mellitus with diabetic chronic kidney disease                        
                                                                                            
                    |
|   7. Hyperlipidemia, unspecified                                                          
                                                                                            
                    |
|   8. Major depressive disorder, single episode, unspecified                               
                                                                                            
                    |
|   9. Chronic obstructive pulmonary disease, unspecified                                   
                                                                                            
                    |
|                                                                                           
                                                                                            
                    |
|Mar 6, 2018 [facility]. [place] Cardiology                              
                                                                                            
                    |
|  Heart Failure                                                                            
                                                                                            
                    |
|   Need for iv access                                                                      
                                                                                            
                    |
|   Type 2 diabetes mellitus with other specified complication                              
                                                                                            
                    |
|   Long term (current) use of insulin                                                      
                                                                                            
                    |
|   Paroxysmal atrial fibrillation                                                          
 
                                                                                            
                    |
|   Anemia in chronic kidney disease                                                        
                                                                                            
                    |
|   Atherosclerotic heart disease of native coronary artery without angina pectoris         
                                                                                            
                    |
|   Cardiomyopathy, unspecified                                                             
                                                                                            
                    |
|   End stage renal disease                                                                 
                                                                                            
                    |
|   Other specified postprocedural states                                                   
                                                                                            
                    |
|                                                                                           
                                                                                            
                    |
|                                                                                           
                                                                                            
                    |
|                                                                                           
                                                                                            
                    |
|Care Providers                                                                             
                                                                                            
                    |
|Provider PRC Type Phone Fax Service Dates                                                  
                                                                                            
                    |
|HEADINGS, ELZA HENDERSON Nurse Practitioner: Family   Current                                
                                                                                            
                    |
|Marco Antonio Martinez /Care Coordinator [phone]  2019 - Current         
                                                                                            
                    |
|[facility] Antonio Martinez Primary Care [phone]  2019 - Current                          
                                                                                            
                    |
|[facility]  [phone] Current                   
                                                                                            
                    |
|[facility]   Current                                
                                                                                            
                    |
|[facility] Primary Care   Current                                                        
                                                                                            
                    |
|[employer] Mental Health Provider [phone] Current                 
                                                                                            
                    |
|[facility] Case or Care Manager   Current                                                   
                                                                                            
                    |
|MIRTA Goel Case or Care Manager [phone] Current
                                                                                            
                    |
|Jairo Gutierrez MD Other   Current                                                       
 
                                                                                            
                    |
|                                                                                           
                                                                                            
                    |
|[employer]                                                                          
                                                                                            
                    |
|This patient has registered at the [facility] Emergency Department     
                                                                                            
                    |
|For more information visit: [email or web address]
[email or web address]                                                                               
                    |
|The above information is provided for the sole purpose of patient treatment. Use of this in
formation beyond the terms of Data Sharing Memorandum of Understanding and License Agreement
 is prohibited. In  |
|certain cases not all visits may be represented. Consult the aforementioned facilities for 
additional information.                                                                     
                    |
|2019 [employer] - [place], [place] - [email or web address]                                                                                       
                    |
+-------------------------------------------------------------------------------------------
--------------------------------------------------------------------------------------------
--------------------+
 
 
 
+-------------------+---------+--------------------+--------------+
| Performing        | Address | City/State/Zipcode | Phone Number |
| Organization      |         |                    |              |
+-------------------+---------+--------------------+--------------+
|   ED INFORMATION  |         |                    |              |
| EXCHANGE          |         |                    |              |
+-------------------+---------+--------------------+--------------+
 Phosphorus (2019  5:38 AM)
 
+------------+--------------------------+-----------------+--------------+
| Component  | Value                    | Ref Range       | Performed At |
+------------+--------------------------+-----------------+--------------+
| PHOSPHORUS | 3.6Comment: Testing      | 2.3 - 4.8 mg/dL | [facility]   |
|            | performed at [facility], [address] |                 | LABORATORY   |
|            |  [place],        |                 |              |
|            | [place]  [address]     |                 |              |
+------------+--------------------------+-----------------+--------------+
 
 
 
+----------+
| Specimen |
+----------+
| Blood    |
+----------+
 
 
 
+---------------+-------------------------+---------------------+----------------+
| Performing    | Address                 | City/State/Zipcode  | Phone Number   |
| Organization  |                         |                     |                |
 
+---------------+-------------------------+---------------------+----------------+
|   [facility]  |   [address]  | [place] [address] |   [phone] |
| LABORATORY    | [address]                   |                     |                |
+---------------+-------------------------+---------------------+----------------+
 Magnesium (2019  5:38 AM)
 
+-----------+--------------------------+-----------------+--------------+
| Component | Value                    | Ref Range       | Performed At |
+-----------+--------------------------+-----------------+--------------+
| MAGNESIUM | 2.3Comment: Testing      | 1.7 - 2.4 mg/dL | [facility]   |
|           | performed at [facility], [address] |                 | LABORATORY   |
|           |  [place],        |                 |              |
|           | [place]  [address]     |                 |              |
+-----------+--------------------------+-----------------+--------------+
 
 
 
+----------+
| Specimen |
+----------+
| Blood    |
+----------+
 
 
 
+---------------+-------------------------+---------------------+----------------+
| Performing    | Address                 | City/State/Zipcode  | Phone Number   |
| Organization  |                         |                     |                |
+---------------+-------------------------+---------------------+----------------+
|   [facility]  |   [address]  | [place] [address] |   [phone] |
| LABORATORY    | [address]                   |                     |                |
+---------------+-------------------------+---------------------+----------------+
 Basic metabolic panel (2019  5:38 AM)Only the most recent of 2 results within the rebekah
e period is included.
 
+----------------+--------------------------+-------------------+--------------+
| Component      | Value                    | Ref Range         | Performed At |
+----------------+--------------------------+-------------------+--------------+
| SODIUM         | 139                      | 135 - 145 mmol/L  | [facility]   |
|                |                          |                   | LABORATORY   |
+----------------+--------------------------+-------------------+--------------+
| POTASSIUM      | 3.9                      | 3.5 - 4.9 mmol/L  | [facility]   |
|                |                          |                   | LABORATORY   |
+----------------+--------------------------+-------------------+--------------+
| CHLORIDE       | 97 (L)                   | 99 - 109 mmol/L   | [facility]   |
|                |                          |                   | LABORATORY   |
+----------------+--------------------------+-------------------+--------------+
| CO2            | 31                       | 23 - 32 mmol/L    | [facility]   |
|                |                          |                   | LABORATORY   |
+----------------+--------------------------+-------------------+--------------+
| ANION GAP AGAP | 15                       | 5 - 20 mmol/L     | [facility]   |
|                |                          |                   | LABORATORY   |
+----------------+--------------------------+-------------------+--------------+
| GLUCOSE        | 169 (H)                  | 65 - 99 mg/dL     | [facility]   |
|                |                          |                   | LABORATORY   |
+----------------+--------------------------+-------------------+--------------+
| BUN            | 13                       | 8 - 25 mg/dL      | [facility]   |
|                |                          |                   | LABORATORY   |
+----------------+--------------------------+-------------------+--------------+
| CREATININE     | 4.8 (H)                  | 0.50 - 1.00 mg/dL | [facility]   |
 
|                |                          |                   | LABORATORY   |
+----------------+--------------------------+-------------------+--------------+
| BUN/CREAT      | 3                        |                   | [facility]   |
|                |                          |                   | LABORATORY   |
+----------------+--------------------------+-------------------+--------------+
| CALCIUM        | 9.4                      | 8.5 - 10.5 mg/dL  | [facility]   |
|                |                          |                   | LABORATORY   |
+----------------+--------------------------+-------------------+--------------+
| EGFR           | 9 (L)Comment: GFR <60:   | >60 mL/min/1.73m2 | [facility]   |
|                | CHRONIC KIDNEY DISEASE,  |                   | LABORATORY   |
|                | IF FOUND OVER A 3 MONTH  |                   |              |
|                | PERIOD.GFR <15: KIDNEY   |                   |              |
|                | FAILURE.FOR       |                   |              |
|                | [employer]IC[employer], MULTIPLY THE  |                   |              |
|                | CALCULATED GFR BY        |                   |              |
|                | 1.210.This eGFR is       |                   |              |
|                | calculated using the     |                   |              |
|                | MDRD IDMS traceable      |                   |              |
|                | equation.Testing         |                   |              |
|                | performed at [facility], [address] |                   |              |
|                |  [place],        |                   |              |
|                | [place]    [address]   |                   |              |
+----------------+--------------------------+-------------------+--------------+
 
 
 
+----------+
| Specimen |
+----------+
| Blood    |
+----------+
 
 
 
+---------------+-------------------------+---------------------+----------------+
| Performing    | Address                 | City/State/Zipcode  | Phone Number   |
| Organization  |                         |                     |                |
+---------------+-------------------------+---------------------+----------------+
|   [facility]  |   [address]  | [place] [address] |   [phone] |
| LABORATORY    | [address]                   |                     |                |
+---------------+-------------------------+---------------------+----------------+
 EKG STANDARD 12 LEAD (2019  6:18 AM)Only the most recent of 3 results within the time
 period is included.
 
+-------------------+--------------------------+-----------+--------------+
| Component         | Value                    | Ref Range | Performed At |
+-------------------+--------------------------+-----------+--------------+
| Ventricular Rate  | 73                       | BPM       | [facility]     |
+-------------------+--------------------------+-----------+--------------+
| Atrial Rate       | 73                       | BPM       | [facility]     |
+-------------------+--------------------------+-----------+--------------+
| P-R Interval      | 146                      | ms        | [facility]     |
+-------------------+--------------------------+-----------+--------------+
| QRS Duration      | 128                      | ms        | [facility]     |
+-------------------+--------------------------+-----------+--------------+
| Q-T Interval      | 464                      | ms        | [facility]     |
+-------------------+--------------------------+-----------+--------------+
| QTC Calculation   | 511                      | ms        | [facility]     |
| (Bezet)           |                          |           |              |
+-------------------+--------------------------+-----------+--------------+
 
| Calculated P Axis | 70                       | degrees   | [facility]     |
+-------------------+--------------------------+-----------+--------------+
| Calculated R Axis | -38                      | degrees   | [facility]     |
+-------------------+--------------------------+-----------+--------------+
| Calculated T Axis | 9                        | degrees   | [facility]     |
+-------------------+--------------------------+-----------+--------------+
| Diagnosis         | Normal sinus             |           | [facility]     |
|                   | rhythmPossible Left      |           |              |
|                   | atrial enlargementLeft   |           |              |
|                   | axis                     |           |              |
|                   | deviationNon-specific    |           |              |
|                   | intra-ventricular        |           |              |
|                   | conduction blockCannot   |           |              |
|                   | rule out Septal infarct  |           |              |
|                   | , age                    |           |              |
|                   | undeterminedAbnormal     |           |              |
|                   | ECGWhen compared with    |           |              |
|                   | ECG of 2019       |           |              |
|                   | 05:50,Minimal criteria   |           |              |
|                   | for Septal infarct are   |           |              |
|                   | now PresentConfirmed by  |           |              |
|                   | EN FRANK ([id number]) on   |           |              |
|                   | 2019 12:03:10 PM    |           |              |
+-------------------+--------------------------+-----------+--------------+
 
 
 
+--------------+-------------------+--------------------+--------------+
| Performing   | Address           | City/State/Zipcode | Phone Number |
| Organization |                   |                    |              |
+--------------+-------------------+--------------------+--------------+
|   [facility]   |   [address] | [place] [address] |              |
+--------------+-------------------+--------------------+--------------+
 IR stent femoral popliteal (2019  6:45 PM)
 
+------------------------------------------------------------------------+--------------+
| Impressions                                                            | Performed At |
+------------------------------------------------------------------------+--------------+
|      1. Aorta and iliac arteries were calcified but without            |   [facility]     |
| significant stenosis.  2. Right SFA with high-grade stenosis           | RADIOLOGY    |
| proximally with good endograft results after angioplasty and stenting. |              |
|   3. 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
| artery to right foot.  4. Right posterior tibial artery was            |              |
| chronically occluded.     Electronically signed by Kirt Mclaughlin MD on    |              |
| 2019 3:50 PM                                                      |              |
+------------------------------------------------------------------------+--------------+
 
 
 
+------------------------------------------------------------------------+--------------+
| Narrative                                                              | Performed At |
+------------------------------------------------------------------------+--------------+
|   LOWER EXTREMITY ARTERIOGRAM, UNILATERAL     PREOPERATIVE             |   [facility]     |
| DIAGNOSIS:    Critical limb ischemia of right lower extremity with     | RADIOLOGY    |
| poorly healing wound of right great toe     POSTOPERATIVE              |              |
| DIAGNOSIS:    Same     PROCEDURE:  1. Moderate conscious sedation  2.  |              |
| Ultrasound guided access of the left common femoral artery  3.         |              |
| Aortogram  4. Selective right common femoral artery catheterization    |              |
| with right leg runoff  5. Right SFA stenting using 6 x 80 mm           |              |
| self-expanding stent  6 Drug eluting balloon angioplasty of right SFA  |              |
 
| using 4 x 100 mm Lutonix balloon     SURGEON: Kirt Mclaughlin MD              |              |
| ASSISTANT: None     ANESTHESIA: Moderate sedation and local anesthesia |              |
|      Informed consent was obtained from the patient. Continuous        |              |
| cardiac monitoring was performed throughout the procedure.  Conscious  |              |
| sedation was provided by the nursing staff during the procedure under  |              |
| my supervision.  Sedation time: 34 minutes  Medications: 2 mg Versed   |              |
| IV, 50 mcg Fentanyl IV     ESTIMATED BLOOD LOSS: Minimal               |              |
| CONTRAST:    53 mL of Isovue 250     INDICATIONS:    See preoperative  |              |
| history and physical     FINDINGS:    Aorta and iliac arteries         |              |
| calcified but without significant stenosis. Right SFA with high-grade  |              |
| stenosis proximally. After angioplasty and stenting, good angiographic |              |
|  results. Good two-vessel runoff to right foot via right anterior      |              |
| tibial artery and peroneal artery. Right posterior tibial artery was   |              |
| chronically occluded.     DESCRIPTION OF PROCEDURE:    The patient was |              |
|  properly identified and brought to the Cath Lab. The patient was      |              |
| placed supine on the catheter table and patient was given moderate     |              |
| sedation by nursing staff under my supervision. Patient's bilateral    |              |
| groins were then prepped and draped in the usual sterile fashion.      |              |
| Local anesthetic was given to the left groin and the left common       |              |
| femoral artery was accessed under ultrasound guidance using a          |              |
| micropuncture needle. A micropuncture sheath was inserted over a wire  |              |
| and up sized to a 4 French sheath. A Omni flush catheter was then      |              |
| inserted into the distal aorta and aortogram was then performed with   |              |
| the findings as described above. The Omni Flush catheter was then used |              |
|  to guide a Glidewire into the right common femoral artery and then    |              |
| the catheter was then advanced over the wire. A right lower extremity  |              |
| runoff was then performed with the findings as described above.        |              |
| Next, an Amplatzer wire was then inserted over the catheter and the 4  |              |
| French sheath was removed. A 6 French destination sheath was then      |              |
| inserted over the wire with the tip of the sheath being placed into    |              |
| the right common femoral artery. A vertebral catheter was inserted     |              |
| over the wire and the wire was then removed. A Glidewire was then      |              |
| placed into the vertebral catheter and used to cross through the       |              |
| stenotic right SFA.    Next, a 260 fix core wire was then inserted     |              |
| over the catheter.    Right SFA was stented using 6 x 80 mm            |              |
| self-expanding stent. Drug eluting balloon angioplasty of the right    |              |
| SFA was then performed using 4 x 100 mm Lutonix balloon.     A final   |              |
| arteriogram was then performed through the sheath. The destination     |              |
| sheath was then removed and a Mynx closure device was then used on the |              |
|  left common femoral artery and hemostasis was ensured. The patient    |              |
| was then taken to the observation unit for further monitoring.         |              |
+------------------------------------------------------------------------+--------------+
 
 
 
+-------------------------------------------------------------------------------------------
--------------------------------------------------------------------------------------------
-----------------------------------+
| Procedure Note                                                                            
                                                                                            
                                   |
+-------------------------------------------------------------------------------------------
--------------------------------------------------------------------------------------------
-----------------------------------+
|   Lauro Baldwin Results In - 2019  8:40 AM PST  LOWER EXTREMITY ARTERIOGRAM,             
                                                                                            
                                   |
| UNILATERALPREOPERATIVE DIAGNOSIS:  Critical limb ischemia of right lower extremity with   
                                                                                            
                                   |
 
| poorly healing wound of right great toePOSTOPERATIVE DIAGNOSIS:  SamePROCEDURE:1.         
                                                                                            
                                   |
| Moderate conscious sedation2. Ultrasound guided access of the left common femoral         
                                                                                            
                                   |
| artery3. Aortogram4. Selective right common femoral artery catheterization with right     
                                                                                            
                                   |
| leg runoff5. Right SFA stenting using 6 x 80 mm self-expanding stent6 Drug eluting        
                                                                                            
                                   |
| balloon angioplasty of right SFA using 4 x 100 mm Lutonix balloonSURGEON: Kirt Mclaughlin,        
                                                                                            
                                   |
| MDASSISTANT: NoneANESTHESIA: Moderate sedation and local anesthesiaInformed consent was   
                                                                                            
                                   |
| obtained from the patient. Continuous cardiac monitoring was performed throughout the     
                                                                                            
                                   |
| procedure.Conscious sedation was provided by the nursing staff during the procedure       
                                                                                            
                                   |
| under my supervision.Sedation time: 34 minutesMedications: 2 mg Versed IV, 50 mcg         
                                                                                            
                                   |
| Fentanyl IVESTIMATED BLOOD LOSS: MinimalCONTRAST:  53 mL of Isovue 250INDICATIONS:  See   
                                                                                            
                                   |
| preoperative history and physicalFINDINGS:  Aorta and iliac arteries calcified but        
                                                                                            
                                   |
| without significant stenosis. Right SFA with high-grade stenosis proximally. After        
                                                                                            
                                   |
| angioplasty and stenting, good angiographic results. Good two-vessel runoff to right      
                                                                                            
                                   |
| foot via right anterior tibial artery and peroneal artery. Right posterior tibial artery  
                                                                                            
                                   |
|  was chronically occluded.DESCRIPTION OF PROCEDURE:  The patient was properly identified  
                                                                                            
                                   |
|  and brought to the Cath Lab. The patient was placed supine on the catheter table and     
                                                                                            
                                   |
| patient was given moderate sedation by nursing staff under my supervision. Patient's      
                                                                                            
                                   |
| bilateral groins were then prepped and draped in the usual sterile fashion. Local         
                                                                                            
                                   |
| anesthetic was given to the left groin and the left common femoral artery was accessed    
                                                                                            
                                   |
| under ultrasound guidance using a micropuncture needle. A micropuncture sheath was        
                                                                                            
                                   |
 
| inserted over a wire and up sized to a 4 French sheath. A Omni flush catheter was then    
                                                                                            
                                   |
| inserted into the distal aorta and aortogram was then performed with the findings as      
                                                                                            
                                   |
| described above. The Omni Flush catheter was then used to guide a Glidewire into the      
                                                                                            
                                   |
| right common femoral artery and then the catheter was then advanced over the wire. A      
                                                                                            
                                   |
| right lower extremity runoff was then performed with the findings as described            
                                                                                            
                                   |
| above.Next, an Amplatzer wire was then inserted over the catheter and the 4 French        
                                                                                            
                                   |
| sheath was removed. A 6 French destination sheath was then inserted over the wire with    
                                                                                            
                                   |
| the tip of the sheath being placed into the right common femoral artery. A vertebral      
                                                                                            
                                   |
| catheter was inserted over the wire and the wire was then removed. A Glidewire was then   
                                                                                            
                                   |
| placed into the vertebral catheter and used to cross through the stenotic right SFA.      
                                                                                            
                                   |
| Next, a 260 fix core wire was then inserted over the catheter.  Right SFA was stented     
                                                                                            
                                   |
| using 6 x 80 mm self-expanding stent. Drug eluting balloon angioplasty of the right SFA   
                                                                                            
                                   |
| was then performed using 4 x 100 mm Lutonix balloon.A final arteriogram was then          
                                                                                            
                                   |
| performed through the sheath. The destination sheath was then removed and a Mynx closure  
                                                                                            
                                   |
|  device was then used on the left common femoral artery and hemostasis was ensured. The   
                                                                                            
                                   |
| patient was then taken to the observation unit for further monitoring.IMPRESSION:1.       
                                                                                            
                                   |
| Aorta and iliac arteries were calcified but without significant stenosis.2. Right SFA     
                                                                                            
                                   |
| with high-grade stenosis proximally with good endograft results after angioplasty and     
                                                                                            
                                   |
| stenting.3. Two-vessel runoff via right anterior tibial artery and peroneal artery to     
                                                                                            
                                   |
| right foot.4. 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       
                                                                                            
                                   |
 
| signed by Kirt Mclaughlin MD on 2019 3:50 PM                                              
                                                                                            
                                   |
|                                                                                           
                                                                                            
                                   |
|A final arteriogram was then performed through the sheath. The destination sheath was then 
removed and a Mynx closure device was then used on the left common femoral artery and hemost
asis was ensured. The patient was  |
|then taken to the observation unit for further monitoring.                                 
                                                                                            
                                   |
|                                                                                           
                                                                                            
                                   |
|IMPRESSION:                                                                                
                                                                                            
                                  |
|                                                                                           
                                                                                            
                                   |
|1. Aorta and iliac arteries were calcified but without significant stenosis.               
                                                                                            
                                   |
|2. Right SFA with high-grade stenosis proximally with good endograft results after angiopla
sty and stenting.                                                                           
                                   |
|3. Two-vessel runoff via right anterior tibial artery and peroneal artery to right foot.   
                                                                                            
                                   |
|4. 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                                 
                                                                                            
                                   |
|                                                                                           
                                                                                            
                                   |
|Electronically signed by Kirt Mclaughlin MD on 2019 3:50 PM                                
                                                                                            
                                   |
+-------------------------------------------------------------------------------------------
--------------------------------------------------------------------------------------------
-----------------------------------+
 
 
 
+--------------------+------------------+--------------------+--------------+
| Performing         | Address          | City/State/Zipcode | Phone Number |
| Organization       |                  |                    |              |
+--------------------+------------------+--------------------+--------------+
|   [facility] |   [address] | [place] [address] |              |
+--------------------+------------------+--------------------+--------------+
 IR angiogram extremity right (2019  6:45 PM)
 
+------------------------------------------------------------------------+--------------+
| Impressions                                                            | Performed At |
+------------------------------------------------------------------------+--------------+
|      1. Aorta and iliac arteries were calcified but without            |   [facility]     |
| significant stenosis.  2. Right SFA with high-grade stenosis           | RADIOLOGY    |
| proximally with good endograft results after angioplasty and stenting. |              |
|   3. 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
 
| artery to right foot.  4. Right posterior tibial artery was            |              |
| chronically occluded.     Electronically signed by Kirt Mclaughlin MD on    |              |
| 2019 3:50 PM                                                      |              |
+------------------------------------------------------------------------+--------------+
 
 
 
+------------------------------------------------------------------------+--------------+
| Narrative                                                              | Performed At |
+------------------------------------------------------------------------+--------------+
|   LOWER EXTREMITY ARTERIOGRAM, UNILATERAL     PREOPERATIVE             |   [facility]     |
| DIAGNOSIS:    Critical limb ischemia of right lower extremity with     | RADIOLOGY    |
| poorly healing wound of right great toe     POSTOPERATIVE              |              |
| DIAGNOSIS:    Same     PROCEDURE:  1. Moderate conscious sedation  2.  |              |
| Ultrasound guided access of the left common femoral artery  3.         |              |
| Aortogram  4. Selective right common femoral artery catheterization    |              |
| with right leg runoff  5. Right SFA stenting using 6 x 80 mm           |              |
| self-expanding stent  6 Drug eluting balloon angioplasty of right SFA  |              |
| using 4 x 100 mm Lutonix balloon     SURGEON: Kirt Mclaughlin MD              |              |
| ASSISTANT: None     ANESTHESIA: Moderate sedation and local anesthesia |              |
|      Informed consent was obtained from the patient. Continuous        |              |
| cardiac monitoring was performed throughout the procedure.  Conscious  |              |
| sedation was provided by the nursing staff during the procedure under  |              |
| my supervision.  Sedation time: 34 minutes  Medications: 2 mg Versed   |              |
| IV, 50 mcg Fentanyl IV     ESTIMATED BLOOD LOSS: Minimal               |              |
| CONTRAST:    53 mL of Isovue 250     INDICATIONS:    See preoperative  |              |
| history and physical     FINDINGS:    Aorta and iliac arteries         |              |
| calcified but without significant stenosis. Right SFA with high-grade  |              |
| stenosis proximally. After angioplasty and stenting, good angiographic |              |
|  results. Good two-vessel runoff to right foot via right anterior      |              |
| tibial artery and peroneal artery. Right posterior tibial artery was   |              |
| chronically occluded.     DESCRIPTION OF PROCEDURE:    The patient was |              |
|  properly identified and brought to the Cath Lab. The patient was      |              |
| placed supine on the catheter table and patient was given moderate     |              |
| sedation by nursing staff under my supervision. Patient's bilateral    |              |
| groins were then prepped and draped in the usual sterile fashion.      |              |
| Local anesthetic was given to the left groin and the left common       |              |
| femoral artery was accessed under ultrasound guidance using a          |              |
| micropuncture needle. A micropuncture sheath was inserted over a wire  |              |
| and up sized to a 4 French sheath. A Omni flush catheter was then      |              |
| inserted into the distal aorta and aortogram was then performed with   |              |
| the findings as described above. The Omni Flush catheter was then used |              |
|  to guide a Glidewire into the right common femoral artery and then    |              |
| the catheter was then advanced over the wire. A right lower extremity  |              |
| runoff was then performed with the findings as described above.        |              |
| Next, an Amplatzer wire was then inserted over the catheter and the 4  |              |
| French sheath was removed. A 6 French destination sheath was then      |              |
| inserted over the wire with the tip of the sheath being placed into    |              |
| the right common femoral artery. A vertebral catheter was inserted     |              |
| over the wire and the wire was then removed. A Glidewire was then      |              |
| placed into the vertebral catheter and used to cross through the       |              |
| stenotic right SFA.    Next, a 260 fix core wire was then inserted     |              |
| over the catheter.    Right SFA was stented using 6 x 80 mm            |              |
| self-expanding stent. Drug eluting balloon angioplasty of the right    |              |
| SFA was then performed using 4 x 100 mm Lutonix balloon.     A final   |              |
| arteriogram was then performed through the sheath. The destination     |              |
| sheath was then removed and a Mynx closure device was then used on the |              |
|  left common femoral artery and hemostasis was ensured. The patient    |              |
| was then taken to the observation unit for further monitoring.         |              |
+------------------------------------------------------------------------+--------------+
 
 
 
 
+-------------------------------------------------------------------------------------------
--------------------------------------------------------------------------------------------
-----------------------------------+
| Procedure Note                                                                            
                                                                                            
                                   |
+-------------------------------------------------------------------------------------------
--------------------------------------------------------------------------------------------
-----------------------------------+
|   Denny, Rad Results In - 2019  8:40 AM PST  LOWER EXTREMITY ARTERIOGRAM,             
                                                                                            
                                   |
| UNILATERALPREOPERATIVE DIAGNOSIS:  Critical limb ischemia of right lower extremity with   
                                                                                            
                                   |
| poorly healing wound of right great toePOSTOPERATIVE DIAGNOSIS:  SamePROCEDURE:1.         
                                                                                            
                                   |
| Moderate conscious sedation2. Ultrasound guided access of the left common femoral         
                                                                                            
                                   |
| artery3. Aortogram4. Selective right common femoral artery catheterization with right     
                                                                                            
                                   |
| leg runoff5. Right SFA stenting using 6 x 80 mm self-expanding stent6 Drug eluting        
                                                                                            
                                   |
| balloon angioplasty of right SFA using 4 x 100 mm Lutonix balloonSURGEON: Kirt Mclaughlin,        
                                                                                            
                                   |
| MDASSISTANT: NoneANESTHESIA: Moderate sedation and local anesthesiaInformed consent was   
                                                                                            
                                   |
| obtained from the patient. Continuous cardiac monitoring was performed throughout the     
                                                                                            
                                   |
| procedure.Conscious sedation was provided by the nursing staff during the procedure       
                                                                                            
                                   |
| under my supervision.Sedation time: 34 minutesMedications: 2 mg Versed IV, 50 mcg         
                                                                                            
                                   |
| Fentanyl IVESTIMATED BLOOD LOSS: MinimalCONTRAST:  53 mL of Isovue 250INDICATIONS:  See   
                                                                                            
                                   |
| preoperative history and physicalFINDINGS:  Aorta and iliac arteries calcified but        
                                                                                            
                                   |
| without significant stenosis. Right SFA with high-grade stenosis proximally. After        
                                                                                            
                                   |
| angioplasty and stenting, good angiographic results. Good two-vessel runoff to right      
                                                                                            
                                   |
| foot via right anterior tibial artery and peroneal artery. Right posterior tibial artery  
                                                                                            
                                   |
 
|  was chronically occluded.DESCRIPTION OF PROCEDURE:  The patient was properly identified  
                                                                                            
                                   |
|  and brought to the Cath Lab. The patient was placed supine on the catheter table and     
                                                                                            
                                   |
| patient was given moderate sedation by nursing staff under my supervision. Patient's      
                                                                                            
                                   |
| bilateral groins were then prepped and draped in the usual sterile fashion. Local         
                                                                                            
                                   |
| anesthetic was given to the left groin and the left common femoral artery was accessed    
                                                                                            
                                   |
| under ultrasound guidance using a micropuncture needle. A micropuncture sheath was        
                                                                                            
                                   |
| inserted over a wire and up sized to a 4 French sheath. A Omni flush catheter was then    
                                                                                            
                                   |
| inserted into the distal aorta and aortogram was then performed with the findings as      
                                                                                            
                                   |
| described above. The Omni Flush catheter was then used to guide a Glidewire into the      
                                                                                            
                                   |
| right common femoral artery and then the catheter was then advanced over the wire. A      
                                                                                            
                                   |
| right lower extremity runoff was then performed with the findings as described            
                                                                                            
                                   |
| above.Next, an Amplatzer wire was then inserted over the catheter and the 4 French        
                                                                                            
                                   |
| sheath was removed. A 6 French destination sheath was then inserted over the wire with    
                                                                                            
                                   |
| the tip of the sheath being placed into the right common femoral artery. A vertebral      
                                                                                            
                                   |
| catheter was inserted over the wire and the wire was then removed. A Glidewire was then   
                                                                                            
                                   |
| placed into the vertebral catheter and used to cross through the stenotic right SFA.      
                                                                                            
                                   |
| Next, a 260 fix core wire was then inserted over the catheter.  Right SFA was stented     
                                                                                            
                                   |
| using 6 x 80 mm self-expanding stent. Drug eluting balloon angioplasty of the right SFA   
                                                                                            
                                   |
| was then performed using 4 x 100 mm Lutonix balloon.A final arteriogram was then          
                                                                                            
                                   |
| performed through the sheath. The destination sheath was then removed and a Mynx closure  
                                                                                            
                                   |
 
|  device was then used on the left common femoral artery and hemostasis was ensured. The   
                                                                                            
                                   |
| patient was then taken to the observation unit for further monitoring.IMPRESSION:1.       
                                                                                            
                                   |
| Aorta and iliac arteries were calcified but without significant stenosis.2. Right SFA     
                                                                                            
                                   |
| with high-grade stenosis proximally with good endograft results after angioplasty and     
                                                                                            
                                   |
| stenting.3. Two-vessel runoff via right anterior tibial artery and peroneal artery to     
                                                                                            
                                   |
| right foot.4. 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       
                                                                                            
                                   |
| signed by Kirt Mclaughlin MD on 2019 3:50 PM                                              
                                                                                            
                                   |
|                                                                                           
                                                                                            
                                   |
|A final arteriogram was then performed through the sheath. The destination sheath was then 
removed and a Mynx closure device was then used on the left common femoral artery and hemost
asis was ensured. The patient was  |
|then taken to the observation unit for further monitoring.                                 
                                                                                            
                                   |
|                                                                                           
                                                                                            
                                   |
|IMPRESSION:                                                                                
                                                                                            
                                  |
|                                                                                           
                                                                                            
                                   |
|1. Aorta and iliac arteries were calcified but without significant stenosis.               
                                                                                            
                                   |
|2. Right SFA with high-grade stenosis proximally with good endograft results after angiopla
sty and stenting.                                                                           
                                   |
|3. Two-vessel runoff via right anterior tibial artery and peroneal artery to right foot.   
                                                                                            
                                   |
|4. 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                                 
                                                                                            
                                   |
|                                                                                           
                                                                                            
                                   |
|Electronically signed by Kirt Mclaughlin MD on 2019 3:50 PM                                
                                                                                            
                                   |
+-------------------------------------------------------------------------------------------
--------------------------------------------------------------------------------------------
-----------------------------------+
 
 
 
 
+--------------------+------------------+--------------------+--------------+
| Performing         | Address          | City/State/Zipcode | Phone Number |
| Organization       |                  |                    |              |
+--------------------+------------------+--------------------+--------------+
|   [facility] |   [address] | [place] [address] |              |
+--------------------+------------------+--------------------+--------------+
 IR aortagram abdominal (2019  6:45 PM)
 
+------------------------------------------------------------------------+--------------+
| Impressions                                                            | Performed At |
+------------------------------------------------------------------------+--------------+
|      1. Aorta and iliac arteries were calcified but without            |   [facility]     |
| significant stenosis.  2. Right SFA with high-grade stenosis           | RADIOLOGY    |
| proximally with good endograft results after angioplasty and stenting. |              |
|   3. 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
| artery to right foot.  4. Right posterior tibial artery was            |              |
| chronically occluded.     Electronically signed by Kirt Mclaughlin MD on    |              |
| 2019 3:50 PM                                                      |              |
+------------------------------------------------------------------------+--------------+
 
 
 
+------------------------------------------------------------------------+--------------+
| Narrative                                                              | Performed At |
+------------------------------------------------------------------------+--------------+
|   LOWER EXTREMITY ARTERIOGRAM, UNILATERAL     PREOPERATIVE             |   [facility]     |
| DIAGNOSIS:    Critical limb ischemia of right lower extremity with     | RADIOLOGY    |
| poorly healing wound of right great toe     POSTOPERATIVE              |              |
| DIAGNOSIS:    Same     PROCEDURE:  1. Moderate conscious sedation  2.  |              |
| Ultrasound guided access of the left common femoral artery  3.         |              |
| Aortogram  4. Selective right common femoral artery catheterization    |              |
| with right leg runoff  5. Right SFA stenting using 6 x 80 mm           |              |
| self-expanding stent  6 Drug eluting balloon angioplasty of right SFA  |              |
| using 4 x 100 mm Lutonix balloon     SURGEON: Kirt Mclaughlin MD              |              |
| ASSISTANT: None     ANESTHESIA: Moderate sedation and local anesthesia |              |
|      Informed consent was obtained from the patient. Continuous        |              |
| cardiac monitoring was performed throughout the procedure.  Conscious  |              |
| sedation was provided by the nursing staff during the procedure under  |              |
| my supervision.  Sedation time: 34 minutes  Medications: 2 mg Versed   |              |
| IV, 50 mcg Fentanyl IV     ESTIMATED BLOOD LOSS: Minimal               |              |
| CONTRAST:    53 mL of Isovue 250     INDICATIONS:    See preoperative  |              |
| history and physical     FINDINGS:    Aorta and iliac arteries         |              |
| calcified but without significant stenosis. Right SFA with high-grade  |              |
| stenosis proximally. After angioplasty and stenting, good angiographic |              |
|  results. Good two-vessel runoff to right foot via right anterior      |              |
| tibial artery and peroneal artery. Right posterior tibial artery was   |              |
| chronically occluded.     DESCRIPTION OF PROCEDURE:    The patient was |              |
|  properly identified and brought to the Cath Lab. The patient was      |              |
| placed supine on the catheter table and patient was given moderate     |              |
| sedation by nursing staff under my supervision. Patient's bilateral    |              |
| groins were then prepped and draped in the usual sterile fashion.      |              |
| Local anesthetic was given to the left groin and the left common       |              |
| femoral artery was accessed under ultrasound guidance using a          |              |
| micropuncture needle. A micropuncture sheath was inserted over a wire  |              |
| and up sized to a 4 French sheath. A Omni flush catheter was then      |              |
| inserted into the distal aorta and aortogram was then performed with   |              |
| the findings as described above. The Omni Flush catheter was then used |              |
 
|  to guide a Glidewire into the right common femoral artery and then    |              |
| the catheter was then advanced over the wire. A right lower extremity  |              |
| runoff was then performed with the findings as described above.        |              |
| Next, an Amplatzer wire was then inserted over the catheter and the 4  |              |
| French sheath was removed. A 6 French destination sheath was then      |              |
| inserted over the wire with the tip of the sheath being placed into    |              |
| the right common femoral artery. A vertebral catheter was inserted     |              |
| over the wire and the wire was then removed. A Glidewire was then      |              |
| placed into the vertebral catheter and used to cross through the       |              |
| stenotic right SFA.    Next, a 260 fix core wire was then inserted     |              |
| over the catheter.    Right SFA was stented using 6 x 80 mm            |              |
| self-expanding stent. Drug eluting balloon angioplasty of the right    |              |
| SFA was then performed using 4 x 100 mm Lutonix balloon.     A final   |              |
| arteriogram was then performed through the sheath. The destination     |              |
| sheath was then removed and a Mynx closure device was then used on the |              |
|  left common femoral artery and hemostasis was ensured. The patient    |              |
| was then taken to the observation unit for further monitoring.         |              |
+------------------------------------------------------------------------+--------------+
 
 
 
+-------------------------------------------------------------------------------------------
--------------------------------------------------------------------------------------------
-----------------------------------+
| Procedure Note                                                                            
                                                                                            
                                   |
+-------------------------------------------------------------------------------------------
--------------------------------------------------------------------------------------------
-----------------------------------+
|   Denny, Rad Results In - 2019  8:40 AM PST  LOWER EXTREMITY ARTERIOGRAM,             
                                                                                            
                                   |
| UNILATERALPREOPERATIVE DIAGNOSIS:  Critical limb ischemia of right lower extremity with   
                                                                                            
                                   |
| poorly healing wound of right great toePOSTOPERATIVE DIAGNOSIS:  SamePROCEDURE:1.         
                                                                                            
                                   |
| Moderate conscious sedation2. Ultrasound guided access of the left common femoral         
                                                                                            
                                   |
| artery3. Aortogram4. Selective right common femoral artery catheterization with right     
                                                                                            
                                   |
| leg runoff5. Right SFA stenting using 6 x 80 mm self-expanding stent6 Drug eluting        
                                                                                            
                                   |
| balloon angioplasty of right SFA using 4 x 100 mm Lutonix balloonSURGEON: Kirt Mclaughlin,        
                                                                                            
                                   |
| MDASSISTANT: NoneANESTHESIA: Moderate sedation and local anesthesiaInformed consent was   
                                                                                            
                                   |
| obtained from the patient. Continuous cardiac monitoring was performed throughout the     
                                                                                            
                                   |
| procedure.Conscious sedation was provided by the nursing staff during the procedure       
                                                                                            
                                   |
 
| under my supervision.Sedation time: 34 minutesMedications: 2 mg Versed IV, 50 mcg         
                                                                                            
                                   |
| Fentanyl IVESTIMATED BLOOD LOSS: MinimalCONTRAST:  53 mL of Isovue 250INDICATIONS:  See   
                                                                                            
                                   |
| preoperative history and physicalFINDINGS:  Aorta and iliac arteries calcified but        
                                                                                            
                                   |
| without significant stenosis. Right SFA with high-grade stenosis proximally. After        
                                                                                            
                                   |
| angioplasty and stenting, good angiographic results. Good two-vessel runoff to right      
                                                                                            
                                   |
| foot via right anterior tibial artery and peroneal artery. Right posterior tibial artery  
                                                                                            
                                   |
|  was chronically occluded.DESCRIPTION OF PROCEDURE:  The patient was properly identified  
                                                                                            
                                   |
|  and brought to the Cath Lab. The patient was placed supine on the catheter table and     
                                                                                            
                                   |
| patient was given moderate sedation by nursing staff under my supervision. Patient's      
                                                                                            
                                   |
| bilateral groins were then prepped and draped in the usual sterile fashion. Local         
                                                                                            
                                   |
| anesthetic was given to the left groin and the left common femoral artery was accessed    
                                                                                            
                                   |
| under ultrasound guidance using a micropuncture needle. A micropuncture sheath was        
                                                                                            
                                   |
| inserted over a wire and up sized to a 4 French sheath. A Omni flush catheter was then    
                                                                                            
                                   |
| inserted into the distal aorta and aortogram was then performed with the findings as      
                                                                                            
                                   |
| described above. The Omni Flush catheter was then used to guide a Glidewire into the      
                                                                                            
                                   |
| right common femoral artery and then the catheter was then advanced over the wire. A      
                                                                                            
                                   |
| right lower extremity runoff was then performed with the findings as described            
                                                                                            
                                   |
| above.Next, an Amplatzer wire was then inserted over the catheter and the 4 French        
                                                                                            
                                   |
| sheath was removed. A 6 French destination sheath was then inserted over the wire with    
                                                                                            
                                   |
| the tip of the sheath being placed into the right common femoral artery. A vertebral      
                                                                                            
                                   |
 
| catheter was inserted over the wire and the wire was then removed. A Glidewire was then   
                                                                                            
                                   |
| placed into the vertebral catheter and used to cross through the stenotic right SFA.      
                                                                                            
                                   |
| Next, a 260 fix core wire was then inserted over the catheter.  Right SFA was stented     
                                                                                            
                                   |
| using 6 x 80 mm self-expanding stent. Drug eluting balloon angioplasty of the right SFA   
                                                                                            
                                   |
| was then performed using 4 x 100 mm Lutonix balloon.A final arteriogram was then          
                                                                                            
                                   |
| performed through the sheath. The destination sheath was then removed and a Mynx closure  
                                                                                            
                                   |
|  device was then used on the left common femoral artery and hemostasis was ensured. The   
                                                                                            
                                   |
| patient was then taken to the observation unit for further monitoring.IMPRESSION:1.       
                                                                                            
                                   |
| Aorta and iliac arteries were calcified but without significant stenosis.2. Right SFA     
                                                                                            
                                   |
| with high-grade stenosis proximally with good endograft results after angioplasty and     
                                                                                            
                                   |
| stenting.3. Two-vessel runoff via right anterior tibial artery and peroneal artery to     
                                                                                            
                                   |
| right foot.4. 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       
                                                                                            
                                   |
| signed by Kirt Mclaughlin MD on 2019 3:50 PM                                              
                                                                                            
                                   |
|                                                                                           
                                                                                            
                                   |
|A final arteriogram was then performed through the sheath. The destination sheath was then 
removed and a Mynx closure device was then used on the left common femoral artery and hemost
asis was ensured. The patient was  |
|then taken to the observation unit for further monitoring.                                 
                                                                                            
                                   |
|                                                                                           
                                                                                            
                                   |
|IMPRESSION:                                                                                
                                                                                            
                                  |
|                                                                                           
                                                                                            
                                   |
|1. Aorta and iliac arteries were calcified but without significant stenosis.               
                                                                                            
                                   |
 
|2. Right SFA with high-grade stenosis proximally with good endograft results after angiopla
sty and stenting.                                                                           
                                   |
|3. Two-vessel runoff via right anterior tibial artery and peroneal artery to right foot.   
                                                                                            
                                   |
|4. 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                                 
                                                                                            
                                   |
|                                                                                           
                                                                                            
                                   |
|Electronically signed by Kirt Mclaughlin MD on 2019 3:50 PM                                
                                                                                            
                                   |
+-------------------------------------------------------------------------------------------
--------------------------------------------------------------------------------------------
-----------------------------------+
 
 
 
+--------------------+------------------+--------------------+--------------+
| Performing         | Address          | City/State/Zipcode | Phone Number |
| Organization       |                  |                    |              |
+--------------------+------------------+--------------------+--------------+
|   [facility] |   [address] | [place] [address] |              |
+--------------------+------------------+--------------------+--------------+
 IR guidance vascular access US (2019  5:40 PM)
 
+------------------------------------------------------------------------+--------------+
| Narrative                                                              | Performed At |
+------------------------------------------------------------------------+--------------+
|   This Point of Care (POC) ultrasound image has been reviewed and      |   [facility]     |
| interpreted by the physician identified as the performing physician in | RADIOLOGY    |
|  the   associated interpretation and report.                           |              |
+------------------------------------------------------------------------+--------------+
 
 
 
+--------------------+------------------+--------------------+--------------+
| Performing         | Address          | City/State/Zipcode | Phone Number |
| Organization       |                  |                    |              |
+--------------------+------------------+--------------------+--------------+
|   [facility] |   [address] | [place] [address] |              |
+--------------------+------------------+--------------------+--------------+
 CBC w/manual diff (2019  3:43 PM)Only the most recent of 2 results within the time danny marquez is included.
 
+----------------------+-------------------------+-------------------+-----------------+
| Component            | Value                   | Ref Range         | Performed At    |
+----------------------+-------------------------+-------------------+-----------------+
| WBC                  | 5.53Comment:            | 3.80 - 11.00 K/uL | [facility] |
+----------------------+-------------------------+-------------------+-----------------+
| RBC                  | 2.76 (L)                | 3.70 - 5.10 M/uL  | [facility] |
+----------------------+-------------------------+-------------------+-----------------+
| HGB                  | 9.4 (L)                 | 11.3 - 15.5 g/dL  | [facility] |
+----------------------+-------------------------+-------------------+-----------------+
| HCT                  | 28.4 (L)                | 34.0 - 46.0 %     | [facility] |
+----------------------+-------------------------+-------------------+-----------------+
| MCV                  | 102.9 (H)               | 80.0 - 100.0 fl   | [facility] |
 
+----------------------+-------------------------+-------------------+-----------------+
| MCH                  | 34.1 (H)                | 27.0 - 34.0 pg    | [facility] |
+----------------------+-------------------------+-------------------+-----------------+
| MCHC                 | 33.2                    | 32.0 - 35.5 g/dL  | [facility] |
+----------------------+-------------------------+-------------------+-----------------+
| RDW SD               | 61.3 (H)                | 37 - 53 fl        | [facility] |
+----------------------+-------------------------+-------------------+-----------------+
| PLT                  | 147 (L)Comment:         | 150 - 400 K/uL    | [facility] |
+----------------------+-------------------------+-------------------+-----------------+
| MPV                  | 9.3Comment:             | fl                | [facility] |
+----------------------+-------------------------+-------------------+-----------------+
| DIFF TYPE            | MANUAL                  |                   | [facility] |
+----------------------+-------------------------+-------------------+-----------------+
| Neutrophils Manual   | 64                      | %                 | [facility] |
+----------------------+-------------------------+-------------------+-----------------+
| Lymphocytes Manual   | 28                      | %                 | [facility] |
+----------------------+-------------------------+-------------------+-----------------+
| Monocytes Manual     | 8                       | %                 | [facility] |
+----------------------+-------------------------+-------------------+-----------------+
| Neutrophils Absolute | 3.54                    | 1.90 - 7.40 K/uL  | [facility] |
+----------------------+-------------------------+-------------------+-----------------+
| Lymphocytes Absolute | 1.55                    | 1.00 - 3.90 K/uL  | [facility] |
+----------------------+-------------------------+-------------------+-----------------+
| Monocytes Absolute   | 0.44                    | 0.00 - 0.80 K/uL  | [facility] |
+----------------------+-------------------------+-------------------+-----------------+
| MORPHOLOGY           | 1+Comment: ANISONORMAL  |                   | [facility] |
|                      | PLT MORPHTesting        |                   |                 |
|                      | [place] [facility] [place]    |                   |                 |
|                      | [address] [facility];[place], [place]  |                   |                 |
|                      | [id number]                   |                   |                 |
|                      |                         |                   |                 |
+----------------------+-------------------------+-------------------+-----------------+
 
 
 
+-------------------+
| Specimen          |
+-------------------+
| Blood - Arm, Left |
+-------------------+
 
 
 
+-------------------+------------------+--------------------+--------------+
| Performing        | Address          | City/State/Zipcode | Phone Number |
| Organization      |                  |                    |              |
+-------------------+------------------+--------------------+--------------+
|   [facility] |   [address] | [place] [address] |              |
+-------------------+------------------+--------------------+--------------+
 CPK (2019  3:43 PM)
 
+-----------+-------------------------+--------------+-----------------+
| Component | Value                   | Ref Range    | Performed At    |
+-----------+-------------------------+--------------+-----------------+
| CPK       | 28 (L)Comment: Testing  | 30 - 240 U/L | [facility] |
|           | performed at [facility] [place]    |              |                 |
|           | [address];[place]  |              |                 |
|           | [id number]                   |              |                 |
+-----------+-------------------------+--------------+-----------------+
 
 
 
 
+-------------------+
| Specimen          |
+-------------------+
| Blood - Arm, Left |
+-------------------+
 
 
 
+-------------------+------------------+--------------------+--------------+
| Performing        | Address          | City/State/Zipcode | Phone Number |
| Organization      |                  |                    |              |
+-------------------+------------------+--------------------+--------------+
|   [facility] |   [address] | [place] [address] |              |
+-------------------+------------------+--------------------+--------------+
 US lower extremty  arterial right (2019  2:23 PM)
 
+------------------------------------------------------------------------+--------------+
| Impressions                                                            | Performed At |
+------------------------------------------------------------------------+--------------+
|   1. Right leg shows biphasic inflow with a greater than 50% stenosis  |   [facility]     |
| at  the origin of the superficial femoral artery                       | RADIOLOGY    |
| (137-309).    Biphasic  outflow.  2. Two vessel runoff to the right    |              |
| foot.  Signed by: Eugenio Hoffman Date/Time: 2019 3:11 PM  |              |
+------------------------------------------------------------------------+--------------+
 
 
 
+------------------------------------------------------------------------+--------------+
| Narrative                                                              | Performed At |
+------------------------------------------------------------------------+--------------+
|   LOWER EXTREMITY ARTERIAL EXAM WITH IMAGING  CLINICAL INFORMATION:    |   [facility]     |
| The patient is a 68-year-old female presenting to the vascular lab     | RADIOLOGY    |
| with  complaints of right foot nonhealing wound.    We have been asked |              |
|  to  evaluate for peripheral arterial disease.  COMPARISON:  None      |              |
| PROCEDURE:  Imaging of the right lower extremity arteries was          |              |
| performed using both  color Doppler and spectral Doppler techniques    |              |
| with a 9 megahertz linear  array transducer.  FINDINGS:  RIGHT  CFA    |              |
| prox 137 biphasic  DFA prox 71 biphasic  SFA prox 309 biphasic  SFA    |              |
| mid 91 biphasic  SFA distal 127 biphasic  POP mid 96 biphasic  GIL     |              |
| prox 69 biphasic  GIL distal 145 biphasic  PTA prox 50 biphasic  PTA   |              |
| distal 58 biphasic  WINIFRED prox 74 biphasic  WINIFRED distal 0    absent     |              |
+------------------------------------------------------------------------+--------------+
 
 
 
+------------------------------------------------------------------------------------------+
| Procedure Note                                                                           |
+------------------------------------------------------------------------------------------+
|   Denny, Rad Results In - 2019  3:14 PM PST  LOWER EXTREMITY ARTERIAL EXAM WITH      |
| IMAGINGCLINICAL INFORMATION:The patient is a 68-year-old female presenting to the        |
| vascular lab withcomplaints of right foot nonhealing wound.  We have been asked          |
| toevaluate for peripheral arterial disease.COMPARISON:NonePROCEDURE:Imaging of the right |
|  lower extremity arteries was performed using bothcolor Doppler and spectral Doppler     |
| techniques with a 9 megahertz lineararray transducer.FINDINGS:RIGHTCFA prox 137          |
| biphasicDFA prox 71 biphasicSFA prox 309 biphasicSFA mid 91 biphasicSFA distal 127       |
| biphasicPOP mid 96 biphasicATA prox 69 biphasicATA distal 145 biphasicPTA prox 50        |
| biphasicPTA distal 58 biphasicPERA prox 74 biphasicPERA distal 0  absentIMPRESSION:1.    |
| Right leg shows biphasic inflow with a greater than 50% stenosis atthe origin of the     |
 
| superficial femoral artery (137-309).  Biphasicoutflow.2. Two vessel runoff to the right |
|  foot.Signed by: Jamilah Hoffman Date/Time: 2019 3:11 PM                       |
|RIGHT                                                                                    |
|CFA prox 137 biphasic                                                                     |
|DFA prox 71 biphasic                                                                      |
|SFA prox 309 biphasic                                                                     |
|SFA mid 91 biphasic                                                                       |
|SFA distal 127 biphasic                                                                   |
|POP mid 96 biphasic                                                                       |
|GIL prox 69 biphasic                                                                      |
|GIL distal 145 biphasic                                                                   |
|PTA prox 50 biphasic                                                                      |
|PTA distal 58 biphasic                                                                    |
|WINIFRED prox 74 biphasic                                                                     |
|WINIFRED distal 0  absent                                                                     |
|IMPRESSION:                                                                              |
|1. Right leg shows biphasic inflow with a greater than 50% stenosis at                    |
|the origin of the superficial femoral artery (137-309).  Biphasic                         |
|outflow.                                                                                 |
|2. Two vessel runoff to the right foot.                                                   |
|Signed by: Eugenio Hoffman                                                                |
|Sign Date/Time: 2019 3:11 PM                                                        |
+------------------------------------------------------------------------------------------+
 
 
 
+--------------------+------------------+--------------------+--------------+
| Performing         | Address          | City/State/Zipcode | Phone Number |
| Organization       |                  |                    |              |
+--------------------+------------------+--------------------+--------------+
|   [facility] |   [address] | [place] [address] |              |
+--------------------+------------------+--------------------+--------------+
 Renal function panel (2019  8:47 AM)
 
+----------------+--------------------------+-------------------+-----------------+
| Component      | Value                    | Ref Range         | Performed At    |
+----------------+--------------------------+-------------------+-----------------+
| SODIUM         | 139                      | 135 - 145 mmol/L  | [facility] |
+----------------+--------------------------+-------------------+-----------------+
| POTASSIUM      | 4.4                      | 3.5 - 4.9 mmol/L  | [facility] |
+----------------+--------------------------+-------------------+-----------------+
| CHLORIDE       | 99                       | 99 - 109 mmol/L   | [facility] |
+----------------+--------------------------+-------------------+-----------------+
| CO2            | 32                       | 23 - 32 mmol/L    | [facility] |
+----------------+--------------------------+-------------------+-----------------+
| ANION GAP AGAP | 12                       | 5 - 20 mmol/L     | [facility] |
+----------------+--------------------------+-------------------+-----------------+
| GLUCOSE        | 197 (H)                  | 65 - 99 mg/dL     | [facility] |
+----------------+--------------------------+-------------------+-----------------+
| BUN            | 24                       | 8 - 25 mg/dL      | [facility] |
+----------------+--------------------------+-------------------+-----------------+
| CREATININE     | 6.5 (H)                  | 0.50 - 1.00 mg/dL | [facility] |
+----------------+--------------------------+-------------------+-----------------+
| CALCIUM        | 8.7                      | 8.5 - 10.5 mg/dL  | [facility] |
+----------------+--------------------------+-------------------+-----------------+
| Albumin        | 2.2 (L)                  | 3.3 - 4.8 g/dL    | [facility] |
+----------------+--------------------------+-------------------+-----------------+
| PHOSPHORUS     | 4.4                      | 2.3 - 4.8 mg/dL   | [facility] |
+----------------+--------------------------+-------------------+-----------------+
| EGFR           | 6 (L)Comment: GFR <60:   | >60 mL/min/1.73m2 | [facility] |
 
|                | CHRONIC KIDNEY DISEASE,  |                   |                 |
|                | IF FOUND OVER A 3 MONTH  |                   |                 |
|                | PERIOD.GFR <15: KIDNEY   |                   |                 |
|                | FAILURE.FOR       |                   |                 |
|                | AMERICANS, MULTIPLY THE  |                   |                 |
|                | CALCULATED GFR BY        |                   |                 |
|                | 1.210.This eGFR is       |                   |                 |
|                | calculated using the     |                   |                 |
|                | MDRD IDMS traceable      |                   |                 |
|                | equation.Testing         |                   |                 |
|                | performed at [facility] [place]     |                   |                 |
|                | [address];[place]   |                   |                 |
|                | [id number]                    |                   |                 |
+----------------+--------------------------+-------------------+-----------------+
 
 
 
+----------+
| Specimen |
+----------+
| Blood    |
+----------+
 
 
 
+-------------------+------------------+--------------------+--------------+
| Performing        | Address          | City/State/Zipcode | Phone Number |
| Organization      |                  |                    |              |
+-------------------+------------------+--------------------+--------------+
|   [facility] |   [address] [facility] | [place] [address] |              |
+-------------------+------------------+--------------------+--------------+
 Troponin I (2019  5:47 PM)Only the most recent of 2 results within the time period is
 included.
 
+------------+--------------------------+-------------------+-----------------+
| Component  | Value                    | Ref Range         | Performed At    |
+------------+--------------------------+-------------------+-----------------+
| TROPONIN I | 0.061Comment:   0.00 to  | 0.00 - 0.10 ng/mL | [facility] |
|            | 0.10     CONSISTENT WITH |                   |                 |
|            |  NORMAL POPULATION0.11   |                   |                 |
|            | to 0.60     CONSISTENT   |                   |                 |
|            | WITH INCREASED RISK FOR  |                   |                 |
|            | ADVERSE OUTCOMES>        |                   |                 |
|            | 0.60                     |                   |                 |
|            | CONSISTENT WITH WHO      |                   |                 |
|            | CRITERIA FOR ACUTE MI    |                   |                 |
|            | Testing performed at     |                   |                 |
|            | [facility] [place] [address]            |                   |                 |
|            | [facility];[place] [address]   |                   |                 |
+------------+--------------------------+-------------------+-----------------+
 
 
 
+----------+
| Specimen |
+----------+
| Blood    |
+----------+
 
 
 
 
+-------------------+------------------+--------------------+--------------+
| Performing        | Address          | City/State/Zipcode | Phone Number |
| Organization      |                  |                    |              |
+-------------------+------------------+--------------------+--------------+
|   [facility] |   [address] | [place] [address] |              |
+-------------------+------------------+--------------------+--------------+
 ISTAT Troponin (2019 10:26 AM)
 
+----------------+-------+-----------+--------------+
| Component      | Value | Ref Range | Performed At |
+----------------+-------+-----------+--------------+
| POC Troponin I | 0.04  |           |              |
+----------------+-------+-----------+--------------+
 POC cardiac troponin (2019  9:54 AM)
 
+----------------------+--------------------------+-------------------+-----------------+
| Component            | Value                    | Ref Range         | Performed At    |
+----------------------+--------------------------+-------------------+-----------------+
| POC CARDIAC TROPONIN | 0.04Comment:   0.00 to   | 0.00 - 0.10 ng/mL | [facility] |
|                      | 0.10    Consistent with  |                   |                 |
|                      | normal population0.11 to |                   |                 |
|                      |  0.40    Consistent with |                   |                 |
|                      |  increased risk for      |                   |                 |
|                      | adverse                  |                   |                 |
|                      | outcomes>0.40            |                   |                 |
|                      |       Consistent with    |                   |                 |
|                      | WHO criteria for Acute   |                   |                 |
|                      | MI Testing performed at  |                   |                 |
|                      | [facility] [place]            |                   |                 |
|                      | [address];[place] [address]   |                   |                 |
+----------------------+--------------------------+-------------------+-----------------+
 
 
 
+-------------------+------------------+--------------------+--------------+
| Performing        | Address          | City/State/Zipcode | Phone Number |
| Organization      |                  |                    |              |
+-------------------+------------------+--------------------+--------------+
|   [facility] |   [address] | [place] [address] |              |
+-------------------+------------------+--------------------+--------------+
 EKG 12 LEAD UNIT PERFORMED (2019  9:44 AM)
 
+-------------------+--------------------------+-----------+--------------+
| Component         | Value                    | Ref Range | Performed At |
+-------------------+--------------------------+-----------+--------------+
| Ventricular Rate  | 93                       | BPM       | [facility]     |
+-------------------+--------------------------+-----------+--------------+
| Atrial Rate       | 93                       | BPM       | [facility]     |
+-------------------+--------------------------+-----------+--------------+
| P-R Interval      | 136                      | ms        | [facility]     |
+-------------------+--------------------------+-----------+--------------+
| QRS Duration      | 126                      | ms        | [facility]     |
+-------------------+--------------------------+-----------+--------------+
| Q-T Interval      | 416                      | ms        | [facility]     |
+-------------------+--------------------------+-----------+--------------+
| QTC Calculation   | 517                      | ms        | [facility]     |
| (Bezet)           |                          |           |              |
+-------------------+--------------------------+-----------+--------------+
| Calculated P Axis | 84                       | degrees   | [facility]     |
 
+-------------------+--------------------------+-----------+--------------+
| Calculated R Axis | -30                      | degrees   | [facility]     |
+-------------------+--------------------------+-----------+--------------+
| Calculated T Axis | 37                       | degrees   | [facility]     |
+-------------------+--------------------------+-----------+--------------+
| Diagnosis         | Normal sinus rhythmLeft  |           | [facility]     |
|                   | axis                     |           |              |
|                   | deviationNon-specific    |           |              |
|                   | intra-ventricular        |           |              |
|                   | conduction blockCannot   |           |              |
|                   | rule out Septal infarct  |           |              |
|                   | , age                    |           |              |
|                   | undeterminedAbnormal     |           |              |
|                   | ECGWhen compared with    |           |              |
|                   | ECG of 2019       |           |              |
|                   | 16:30,QRS duration has   |           |              |
|                   | decreasedMinimal         |           |              |
|                   | criteria for Septal      |           |              |
|                   | infarct are now PresentT |           |              |
|                   |  wave inversion now      |           |              |
|                   | evident in Lateral       |           |              |
|                   | leadsThis ECG contains   |           |              |
|                   | Unconfirmed              |           |              |
|                   | Interpretation           |           |              |
|                   | Statements.    See ED    |           |              |
|                   | Record for Physician     |           |              |
|                   | Interpretation.          |           |              |
|                   | Confirmed by MUSE READ   |           |              |
|                   | ONLY, -COMPUTER ([id number]),   |           |              |
|                   |  Sherman Grissom   |           |              |
|                   | Samm ([id number]) on 2019  |           |              |
|                   | 3:51:40 AM               |           |              |
+-------------------+--------------------------+-----------+--------------+
 
 
 
+--------------+-------------------+--------------------+--------------+
| Performing   | Address           | City/State/Zipcode | Phone Number |
| Organization |                   |                    |              |
+--------------+-------------------+--------------------+--------------+
|   [facility]   |   [address] | [place] [address] |              |
+--------------+-------------------+--------------------+--------------+
 from Last 3 Months
 
 Insurance
 
 
+----------+--------+-------------+------+-------+----------------------+
| Payer    | Benefi | Subscriber  | Type | Phone | Address              |
|          | t Plan | ID          |      |       |                      |
|          |  /     |             |      |       |                      |
|          | Group  |             |      |       |                      |
+----------+--------+-------------+------+-------+----------------------+
| MEDICARE | MEDICA | [id number] |      |       |   [address]        |
|          | [place]     |             |      |       | [place] [id number] |
|          | IP-OP  |             |      |       |                      |
+----------+--------+-------------+------+-------+----------------------+
| MEDICAID | MEDICA | [id number]    |      |       |   [address]        |
|          | ID     |             |      |       | [place]          |
|          | [place] |             |      |       | [id number]           |
 
+----------+--------+-------------+------+-------+----------------------+
 
 
 
+----------------------+--------+-------------+--------+-------------+-----------------+
| Guarantor Name       | Accoun | Relation to | Date   | Phone       | Billing Address |
|                      | t Type |  Patient    | of     |             |                 |
|                      |        |             | Birth  |             |                 |
+----------------------+--------+-------------+--------+-------------+-----------------+
| CHERIE JO | Person | Self        | / |   Home:     |   [address]    |
|                      | al/Fam |             | 1950   | [phone] | [place]    |
|                      | [place]    |             |        | [address]        | [address]      |
+----------------------+--------+-------------+--------+-------------+-----------------+

## 2019-04-09 NOTE — XMS
Encounter Summary
  Created on: 2019
 
 Cherie Villalobos
 External Reference #: [id number]
 : 48
 Sex: Female
 
 Demographics
 
 
+-----------------------+--------------------------+
| Address               | [address]               |
|                       | [place]  [id number] |
+-----------------------+--------------------------+
| Home Phone            | [phone]          |
+-----------------------+--------------------------+
| Preferred Language    | Unknown                  |
+-----------------------+--------------------------+
| Marital Status        |                   |
+-----------------------+--------------------------+
| Oriental orthodox Affiliation | 1013                     |
+-----------------------+--------------------------+
| Race                  | Unknown                  |
+-----------------------+--------------------------+
| Ethnic Group          | Unknown                  |
+-----------------------+--------------------------+
 
 
 Author
 
 
+--------------+-----------------------+
| Author       | Sherry Koinify |
+--------------+-----------------------+
| Organization | [facility] |
+--------------+-----------------------+
| Address      | Unknown               |
+--------------+-----------------------+
| Phone        | Unavailable           |
+--------------+-----------------------+
 
 
 
 Support
 
 
+---------------+--------------+---------------------+-----------------+
| Name          | Relationship | Address             | Phone           |
+---------------+--------------+---------------------+-----------------+
| Anoop Villalobos | ECON         | [place], | [phone] |
|               |              |  [place]  [address]     |                 |
+---------------+--------------+---------------------+-----------------+
| Jennifer Dickson | ECON         | [place]       | [phone] |
|               |              | [phone]               |                 |
+---------------+--------------+---------------------+-----------------+
 
 
 
 
 Care Team Providers
 
 
+-----------------------+------+-----------------+
| Care Team Member Name | Role | Phone           |
+-----------------------+------+-----------------+
| Ivy Couch DO      | PCP  | [phone] |
+-----------------------+------+-----------------+
 
 
 
 Encounter Details
 
 
+--------+------------+----------------------+-----------+-------------+
| Date   | Type       | Department           | Care Team | Description |
+--------+------------+----------------------+-----------+-------------+
| / | Procedure  |   [facility]    |           |             |
| 2019   | Pass       | [facility] [address]   |           |             |
|        |            | Floor [address] |           |             |
|        |            |   [address] [facility]     |           |             |
|        |            | [place] [address]   |           |             |
|        |            | [phone]         |           |             |
+--------+------------+----------------------+-----------+-------------+
 
 
 
 Social History
 
 
+---------------+------------+-----------+--------+------------------+
| Tobacco Use   | Types      | Packs/Day | Years  | Date             |
|               |            |           | Used   |                  |
+---------------+------------+-----------+--------+------------------+
| Former Smoker | Cigarettes | 1         | 30     | Quit: 2004 |
+---------------+------------+-----------+--------+------------------+
 
 
 
+---------------------+---+---+---+
| Smokeless Tobacco:  |   |   |   |
| Never Used          |   |   |   |
+---------------------+---+---+---+
 
 
 
+------------------------------+
| Comments: quite smoking  |
+------------------------------+
 
 
 
+-------------+-----------+---------+----------+
| Alcohol Use | Drinks/We | oz/Week | Comments |
|             | ek        |         |          |
+-------------+-----------+---------+----------+
| No          |           |         |          |
+-------------+-----------+---------+----------+
 
 
 
 
+------------------+---------------+
| Sex Assigned at  | Date Recorded |
| Birth            |               |
+------------------+---------------+
| Not on file      |               |
+------------------+---------------+
 as of this encounter
 
 Plan of Treatment
 
 
+--------+---------+-----------+----------------------+-------------+
| Date   | Type    | Specialty | Care Team            | Description |
+--------+---------+-----------+----------------------+-------------+
| 04/10/ | Office  | Podiatry  |   Srinivasan Jordan,  |             |
| 2019   | Visit   |           | DPM  [address], |             |
|        |         |           |  [place],  |             |
|        |         |           | [place] [address]             |             |
|        |         |           | [phone]         |             |
|        |         |           | [phone] (Fax)   |             |
+--------+---------+-----------+----------------------+-------------+
 as of this encounter
 
 Visit Diagnoses
 Not on filein this encounter

## 2019-04-09 NOTE — XMS
Encounter Summary
  Created on: 2019
 
 Cherie Villalobos
 External Reference #: [id number]
 : 48
 Sex: Female
 
 Demographics
 
 
+-----------------------+--------------------------+
| Address               | [address]               |
|                       | [place]  [address] |
+-----------------------+--------------------------+
| Home Phone            | [phone]          |
+-----------------------+--------------------------+
| Preferred Language    | Unknown                  |
+-----------------------+--------------------------+
| Marital Status        |                   |
+-----------------------+--------------------------+
| Adventism Affiliation | 1013                     |
+-----------------------+--------------------------+
| Race                  | Unknown                  |
+-----------------------+--------------------------+
| Ethnic Group          | Unknown                  |
+-----------------------+--------------------------+
 
 
 Author
 
 
+--------------+-----------------------+
| Author       | Sherry Morgan Solar |
+--------------+-----------------------+
| Organization | [facility] |
+--------------+-----------------------+
| Address      | Unknown               |
+--------------+-----------------------+
| Phone        | Unavailable           |
+--------------+-----------------------+
 
 
 
 Support
 
 
+---------------+--------------+---------------------+-----------------+
| Name          | Relationship | Address             | Phone           |
+---------------+--------------+---------------------+-----------------+
| Anoop Villalobos | ECON         | [place], | [phone] |
|               |              |  [place]  [address]     |                 |
+---------------+--------------+---------------------+-----------------+
| Jennifer Dickson | ECON         | [place]       | [phone] |
|               |              | [address]               |                 |
+---------------+--------------+---------------------+-----------------+
 
 
 
 
 Care Team Providers
 
 
+-----------------------+------+-----------------+
| Care Team Member Name | Role | Phone           |
+-----------------------+------+-----------------+
| Ivy Couch DO      | PCP  | [phone] |
+-----------------------+------+-----------------+
 
 
 
 Encounter Details
 
 
+--------+------------+----------------------+-----------+-------------+
| Date   | Type       | Department           | Care Team | Description |
+--------+------------+----------------------+-----------+-------------+
| / | Procedure  |   [facility]    |           |             |
| 2019   | Pass       | [facility] [address]   |           |             |
|        |            | Floor [address] |           |             |
|        |            |   [address]     |           |             |
|        |            | [place] [address]   |           |             |
|        |            | [phone]         |           |             |
+--------+------------+----------------------+-----------+-------------+
 
 
 
 Social History
 
 
+---------------+------------+-----------+--------+------------------+
| Tobacco Use   | Types      | Packs/Day | Years  | Date             |
|               |            |           | Used   |                  |
+---------------+------------+-----------+--------+------------------+
| Former Smoker | Cigarettes | 1         | 30     | Quit: 2004 |
+---------------+------------+-----------+--------+------------------+
 
 
 
+---------------------+---+---+---+
| Smokeless Tobacco:  |   |   |   |
| Never Used          |   |   |   |
+---------------------+---+---+---+
 
 
 
+------------------------------+
| Comments: quite smoking  |
+------------------------------+
 
 
 
+-------------+-----------+---------+----------+
| Alcohol Use | Drinks/We | oz/Week | Comments |
|             | ek        |         |          |
+-------------+-----------+---------+----------+
| No          |           |         |          |
+-------------+-----------+---------+----------+
 
 
 
 
+------------------+---------------+
| Sex Assigned at  | Date Recorded |
| Birth            |               |
+------------------+---------------+
| Not on file      |               |
+------------------+---------------+
 as of this encounter
 
 Plan of Treatment
 
 
+--------+---------+-----------+----------------------+-------------+
| Date   | Type    | Specialty | Care Team            | Description |
+--------+---------+-----------+----------------------+-------------+
| 04/10/ | Office  | Podiatry  |   Srinivasan Jordan,  |             |
| 2019   | Visit   |           | DPM  [address], |             |
|        |         |           |  [place],  |             |
|        |         |           | [place] [address]             |             |
|        |         |           | [phone]         |             |
|        |         |           | [phone] (Fax)   |             |
+--------+---------+-----------+----------------------+-------------+
 as of this encounter
 
 Visit Diagnoses
 Not on filein this encounter

## 2019-04-09 NOTE — XMS
Encounter Summary
  Created on: 2019
 
 Cherie Villalobos
 External Reference #: [id number]
 : 48
 Sex: Female
 
 Demographics
 
 
+-----------------------+--------------------------+
| Address               | [address]               |
|                       | [place]  [address] |
+-----------------------+--------------------------+
| Home Phone            | [phone]          |
+-----------------------+--------------------------+
| Preferred Language    | Unknown                  |
+-----------------------+--------------------------+
| Marital Status        |                   |
+-----------------------+--------------------------+
| Gnosticist Affiliation | 1013                     |
+-----------------------+--------------------------+
| Race                  | Unknown                  |
+-----------------------+--------------------------+
| Ethnic Group          | Unknown                  |
+-----------------------+--------------------------+
 
 
 Author
 
 
+--------------+--------------------------------------------+
| Author       | [facility]  |
|              | [facility] [place]                                |
+--------------+--------------------------------------------+
| Organization | [facility]  |
|              | [facility] [place]                                |
+--------------+--------------------------------------------+
| Address      | Unknown                                    |
+--------------+--------------------------------------------+
| Phone        | Unavailable                                |
+--------------+--------------------------------------------+
 
 
 
 Support
 
 
+---------------+--------------+---------------------+-----------------+
| Name          | Relationship | Address             | Phone           |
+---------------+--------------+---------------------+-----------------+
| Anoop Villalobos | ECON         | [address], | [phone] |
|               |              |  [place]  [address]     |                 |
+---------------+--------------+---------------------+-----------------+
| Jennifer Dickson | ECON         | [place], [place]       | [phone] |
|               |              | [phone]               |                 |
+---------------+--------------+---------------------+-----------------+
 
 
 
 
 Care Team Providers
 
 
+--------------------------+------+-----------------+
| Care Team Member Name    | Role | Phone           |
+--------------------------+------+-----------------+
| Araseli Montelongo Activity  | PCP  | [phone] |
| Professional             |      |                 |
+--------------------------+------+-----------------+
 
 
 
 Reason for Visit
 
 
+-----------+---------------------------------+
| Reason    | Comments                        |
+-----------+---------------------------------+
| Lab Order | Pt due for labs prior to appt.  |
+-----------+---------------------------------+
 
 
 
 Encounter Details
 
 
+--------+-----------+----------------------+----------------------+--------------------+
| Date   | Type      | Department           | Care Team            | Description        |
+--------+-----------+----------------------+----------------------+--------------------+
| / | Telephone |   [facility]          |   Johnie John, | Lab Order (Pt due  |
|    |           | CARDIOLOGY  [address]    |  MD  [address] [place] | for labs prior to  |
|        |           | [place]  [place], |  [place],   | appt. )            |
|        |           |  [place] [address]       | [place] [address]             |                    |
|        |           | [phone]         | [phone]         |                    |
|        |           |                      | [phone] (Fax)   |                    |
+--------+-----------+----------------------+----------------------+--------------------+
 
 
 
 Social History
 
 
+---------------+------------+-----------+--------+------------------+
| Tobacco Use   | Types      | Packs/Day | Years  | Date             |
|               |            |           | Used   |                  |
+---------------+------------+-----------+--------+------------------+
| Former Smoker | Cigarettes | 1         | 30     | Quit: 2004 |
+---------------+------------+-----------+--------+------------------+
 
 
 
+---------------------+---+---+---+
| Smokeless Tobacco:  |   |   |   |
| Never Used          |   |   |   |
+---------------------+---+---+---+
 
 
 
 
+-------------+-----------+---------+----------+
| Alcohol Use | Drinks/We | oz/Week | Comments |
|             | ek        |         |          |
+-------------+-----------+---------+----------+
| No          |           |         |          |
+-------------+-----------+---------+----------+
 
 
 
+------------------+---------------+
| Sex Assigned at  | Date Recorded |
| Birth            |               |
+------------------+---------------+
| Not on file      |               |
+------------------+---------------+
 
 
 
+----------------+-------------+-------------+
| Job Start Date | Occupation  | Industry    |
+----------------+-------------+-------------+
| Not on file    | Not on file | Not on file |
+----------------+-------------+-------------+
 
 
 
+----------------+--------------+------------+
| Travel History | Travel Start | Travel End |
+----------------+--------------+------------+
 
 
 
+-------------------------------------+
| No recent travel history available. |
+-------------------------------------+
 documented as of this encounter
 
 Functional Status
 
 
+---------------------------------------------+----------+--------------------+
| Functional Status                           | Response | Date of Assessment |
+---------------------------------------------+----------+--------------------+
| Are you deaf or do you have serious         | No       | 2018         |
| difficulty hearing?                         |          |                    |
+---------------------------------------------+----------+--------------------+
| Are you blind or do you have serious        | No       | 2018         |
| difficulty seeing, even when wearing        |          |                    |
| glasses?                                    |          |                    |
+---------------------------------------------+----------+--------------------+
| Do you have serious difficulty walking or   | No       | 2018         |
| climbing stairs? (5 years old or older)     |          |                    |
+---------------------------------------------+----------+--------------------+
| Do you have difficulty dressing or bathing? | No       | 2018         |
|  (5 years old or older)                     |          |                    |
+---------------------------------------------+----------+--------------------+
| Because of a physical, mental, or emotional | No       | 2018         |
|  condition, do you have difficulty doing    |          |                    |
| errands alone such as visiting a doctor's   |          |                    |
 
| office or shopping? [15 years old or        |          |                    |
| older)]                                     |          |                    |
+---------------------------------------------+----------+--------------------+
 
 
 
+---------------------------------------------+----------+--------------------+
| Cognitive Status                            | Response | Date of Assessment |
+---------------------------------------------+----------+--------------------+
| Because of a physical, mental, or emotional | No       | 2018         |
|  condition, do you have serious difficulty  |          |                    |
| concentrating, remembering, or making       |          |                    |
| decisions? (5 years old or older)           |          |                    |
+---------------------------------------------+----------+--------------------+
 documented as of this encounter
 
 Plan of Treatment
 
 
+--------+---------+------------+----------------------+-------------+
| Date   | Type    | Specialty  | Care Team            | Description |
+--------+---------+------------+----------------------+-------------+
| / | Office  | Cardiology |   Johnie John, |             |
| 2019   | Visit   |            |  MD  [address] |             |
|        |         |            |  [place],   |             |
|        |         |            | [place] [address]             |             |
|        |         |            | [phone]         |             |
|        |         |            | [phone] (Fax)   |             |
+--------+---------+------------+----------------------+-------------+
 
 
 
+-------------+--------+----------------------+----------------------+
| Name        | Priori | Associated Diagnoses | Order Schedule       |
|             | ty     |                      |                      |
+-------------+--------+----------------------+----------------------+
| Lipid Panel | Routin |   Mixed              | 1 Occurrences        |
|             | e      | hyperlipidemia       | starting 2019  |
|             |        |                      | until 2020     |
+-------------+--------+----------------------+----------------------+
 documented as of this encounter
 
 Visit Diagnoses
 
 
+----------------------------------+
| Diagnosis                        |
+----------------------------------+
|   Mixed hyperlipidemia - Primary |
+----------------------------------+
 documented in this encounter

## 2019-04-09 NOTE — XMS
Encounter Summary
  Created on: 2019
 
 Cherie Villalobos
 External Reference #: [id number]
 : 48
 Sex: Female
 
 Demographics
 
 
+-----------------------+--------------------------+
| Address               | [address]               |
|                       | [place]  [address] |
+-----------------------+--------------------------+
| Home Phone            | [phone]          |
+-----------------------+--------------------------+
| Preferred Language    | Unknown                  |
+-----------------------+--------------------------+
| Marital Status        |                   |
+-----------------------+--------------------------+
| Taoism Affiliation | 1013                     |
+-----------------------+--------------------------+
| Race                  | Unknown                  |
+-----------------------+--------------------------+
| Ethnic Group          | Unknown                  |
+-----------------------+--------------------------+
 
 
 Author
 
 
+--------------+-----------------------+
| Author       | Sherry Trunity |
+--------------+-----------------------+
| Organization | [facility] |
+--------------+-----------------------+
| Address      | Unknown               |
+--------------+-----------------------+
| Phone        | Unavailable           |
+--------------+-----------------------+
 
 
 
 Support
 
 
+---------------+--------------+---------------------+-----------------+
| Name          | Relationship | Address             | Phone           |
+---------------+--------------+---------------------+-----------------+
| Anoop Villalobos | ECON         | [place], | [phone] |
|               |              |  [place]  [address]     |                 |
+---------------+--------------+---------------------+-----------------+
| Jennifer Dickson | ECON         | [place]       | [phone] |
|               |              | [address]               |                 |
+---------------+--------------+---------------------+-----------------+
 
 
 
 
 Care Team Providers
 
 
+-----------------------+------+-----------------+
| Care Team Member Name | Role | Phone           |
+-----------------------+------+-----------------+
| Ivy Couch DO      | PCP  | [phone] |
+-----------------------+------+-----------------+
 
 
 
 Reason for Visit
 
 
+--------+--------------------------------+
| Reason | Comments                       |
+--------+--------------------------------+
| Other  | [facility] medical records request |
+--------+--------------------------------+
 
 
 
 Encounter Details
 
 
+--------+-------------+----------------------+---------------------+------------------+
| Date   | Type        | Department           | Care Team           | Description      |
+--------+-------------+----------------------+---------------------+------------------+
| / | Documentati |   [facility]      |   Peabody, Jessica  | Other [facility]    |
| 2019   | on Only     | [address]        | [facility]               | medical records  |
|        |             | [address]   |                     | request)         |
|        |             | [place] [address]   |                     |                  |
|        |             | [phone]         |                     |                  |
+--------+-------------+----------------------+---------------------+------------------+
 
 
 
 Social History
 
 
+---------------+------------+-----------+--------+------------------+
| Tobacco Use   | Types      | Packs/Day | Years  | Date             |
|               |            |           | Used   |                  |
+---------------+------------+-----------+--------+------------------+
| Former Smoker | Cigarettes | 1         | 30     | Quit: 2004 |
+---------------+------------+-----------+--------+------------------+
 
 
 
+---------------------+---+---+---+
| Smokeless Tobacco:  |   |   |   |
| Never Used          |   |   |   |
+---------------------+---+---+---+
 
 
 
+------------------------------+
| Comments: quite smoking 2004 |
+------------------------------+
 
 
 
 
+-------------+-----------+---------+----------+
| Alcohol Use | Drinks/We | oz/Week | Comments |
|             | ek        |         |          |
+-------------+-----------+---------+----------+
| No          |           |         |          |
+-------------+-----------+---------+----------+
 
 
 
+------------------+---------------+
| Sex Assigned at  | Date Recorded |
| Birth            |               |
+------------------+---------------+
| Not on file      |               |
+------------------+---------------+
 as of this encounter
 
 Progress Notes
 Peabody, Jessica M, RN - 2019  6:22 PM [facility] medical records request was denied. 
Fax sent forward to [facility] Ctaia (F#[phone]). Fax confirmation received. in this en
counter
 
 Plan of Treatment
 
 
+--------+---------+-----------+----------------------+-------------+
| Date   | Type    | Specialty | Care Team            | Description |
+--------+---------+-----------+----------------------+-------------+
| 04/10/ | Office  | Podiatry  |   Srinivasan Jordan,  |             |
|    | Visit   |           | DPM  [address], |             |
|        |         |           |  [address]  [place],  |             |
|        |         |           | [place] [address]             |             |
|        |         |           | [phone]         |             |
|        |         |           | [phone] (Fax)   |             |
+--------+---------+-----------+----------------------+-------------+
 as of this encounter
 
 Visit Diagnoses
 Not on filein this encounter

## 2019-04-09 NOTE — XMS
PreManage Notification: DANILO JO MRN:[id number]
 
Security Information
 
Security Events
No recent Security Events currently on file
 
 
 
CRITERIA MET
------------
- Group Notification
- 6 ED Visits in 6 Months
- [facility] - Has Care Guidelines
- [facility] - 3 Facilities in 90 Days
- PDMP
- [facility] - 2 Visits in 30 Days
 
 
CARE PROVIDERS
-------------------------------------------------------------------------------------
SANTIAGO JOHNSON     Nurse Practitioner: Family     Current
 
PHONE: Unknown
-------------------------------------------------------------------------------------
Marco Antonio Martinez     /Care Coordinator     01/04/2019-Current
 
PHONE: [phone]
-------------------------------------------------------------------------------------
Marco Antonio Martinez     Primary Care     01/04/2019-Current
 
PHONE: [phone]
-------------------------------------------------------------------------------------
[facility]     Primary Care     Current
OR
 
PHONE: Unknown
-------------------------------------------------------------------------------------
RUTHY TODD              Primary Care     [facility]
 
PHONE: Unknown
-------------------------------------------------------------------------------------
MANOLO GONZALEZ     Primary Care     Current
 
PHONE: Unknown
-------------------------------------------------------------------------------------
[employer]     Mental Health Provider     Current
 
PHONE: [phone]
-------------------------------------------------------------------------------------
orsagrario     Case or Care Manager     Current
 
PHONE: Unknown
-------------------------------------------------------------------------------------
Willard Doyle         Case or Care Manager     Current
[employer]
 
 
PHONE: [phone]
-------------------------------------------------------------------------------------
Jairo Gutierrez MD     Other     Current
 
PHONE: Unknown
-------------------------------------------------------------------------------------
 
Guidelines Source: [employer]
Guidelines Date: 03/19/2019
 
Care Coordination:
    Patient requires education on appropriate ED usage.\T\nbsp; Emphasize the 
    importance of using outpatient medical services for the treatment of chronic
    conditions. Please contact Community Health WorkerWillard at [phone] if
    patient is seen in ED.
-------------------------------------------------------------------------
Additional care guidelines exist for the following facilities:    
[facility] ( 09/12/2018 )
 
TISHA VISIT COUNT (12 MO.)
-------------------------------------------------------------------------------------
15 [employer]
 
[facility]
 
[address] [place] [address] ALEJA Braun
-------------------------------------------------------------------------------------
TOTAL 25
-------------------------------------------------------------------------------------
NOTE: Visits indicate total known visits.
 
ED/UCC VISIT TRACKING (12 MO.)
-------------------------------------------------------------------------------------
04/09/2019 12:56
[facility] [place]
 
TYPE: Emergency
 
COMPLAINT:
- LEFT ARM WOUND
-------------------------------------------------------------------------------------
04/03/2019 19:27
[facility] [place]
 
TYPE: Emergency
 
COMPLAINT:
- CHEST PAIN
 
DIAGNOSES:
- Chronic kidney disease, unspecified
- Dependence on renal dialysis
- Other specified abnormal findings of blood chemistry
- Long term (current) use of insulin
- Allergy status to narcotic agent status
- Chronic obstructive pulmonary disease, unspecified
- Allergy status to other drugs, medicaments and biological substances status
- Long term (current) use of aspirin
 
- Chest pain, unspecified
- Type 2 diabetes mellitus without complications
- Other long term (current) drug therapy
- Other chest pain
- Personal history of nicotine dependence
-------------------------------------------------------------------------------------
03/26/2019 11:56
[facility]
 
TYPE: Emergency
 
COMPLAINT:
- CHEST PAIN
-------------------------------------------------------------------------------------
03/17/2019 13:25
[facility] [place]
 
[facility]
 
DIAGNOSES:
- Contusion of other part of head, initial encounter
- FALL
- Fall on same level, unspecified, initial encounter
- Hyperkalemia
-------------------------------------------------------------------------------------
03/11/2019 15:03
[facility] [place]
 
[facility]
 
DIAGNOSES:
- Weakness
- Acute cystitis without hematuria
- weakness
-------------------------------------------------------------------------------------
02/14/2019 15:19
[facility]
 
[facility]
 
DIAGNOSES:
- Unspecified fall, initial encounter
- Displaced fracture of distal phalanx of right great toe, initial encounter for
closed fracture
- Cellulitis of right toe
- Other fatigue
- Skin Complaint
- Weakness
- Referral
- Unspecified place in unspecified non-institutional (private) residence as the place
of occurrence of the external cause
-------------------------------------------------------------------------------------
02/13/2019 15:56
[facility]     [place]
 
TYPE: Emergency
 
DIAGNOSES:
- Multiple Falls
- Referral
 
- Circulatory Problem
-------------------------------------------------------------------------------------
02/09/2019 22:15
[facility]
 
TYPE: Emergency
 
DIAGNOSES:
- Other chest pain
- ABD PAIN
-------------------------------------------------------------------------------------
01/23/2019 13:01
[facility] [place]
 
[facility]: [facility]
 
DIAGNOSES:
- Foot Pain
-------------------------------------------------------------------------------------
01/16/2019 09:44
[facility] [place]
 
[facility]
 
DIAGNOSES:
- Chest Pain
- Elevated blood-pressure reading, without diagnosis of hypertension
- Presence of aortocoronary bypass graft
- Nausea
- Chest pain, unspecified
- Other specified postprocedural states
- Shortness of Breath
-------------------------------------------------------------------------------------
01/16/2019 05:43
[facility] [place]
 
TYPE: Emergency
 
DIAGNOSES:
- CHEST PAIN
- Abnormal levels of other serum enzymes
- Personal history of other diseases of the circulatory system
- Chest pain, unspecified
-------------------------------------------------------------------------------------
01/08/2019 14:45
[facility] [place]
 
TYPE: Emergency
 
DIAGNOSES:
- Hemorrhage due to vascular prosthetic devices, implants and grafts, initial
encounter
- FISTULA BLEEDING
-------------------------------------------------------------------------------------
12/22/2018 17:20
[facility] [place]
 
TYPE: Emergency
 
DIAGNOSES:
 
- Type 2 diabetes mellitus with hyperglycemia
- Chest pain, unspecified
- CHEST PAIN
-------------------------------------------------------------------------------------
11/27/2018 18:05
[facility]
 
TYPE: Emergency
 
COMPLAINT:
- SOB
-------------------------------------------------------------------------------------
11/27/2018 07:45
[facility] [place]
 
TYPE: Emergency
 
DIAGNOSES:
- Unspecified fall, initial encounter
- FALL
- Dependence on renal dialysis
- End stage renal disease
-------------------------------------------------------------------------------------
11/19/2018 14:59
[facility] [place]
 
TYPE: Emergency
 
DIAGNOSES:
- Essential (primary) hypertension
- Type 2 diabetes mellitus with hyperglycemia
- Type 2 diabetes mellitus with unspecified complications
- FALL
- Unspecified fall, initial encounter
- Headache
-------------------------------------------------------------------------------------
11/17/2018 14:57
[facility]
 
TYPE: Emergency
 
COMPLAINT:
- BACK PAIN
- DORSALGIA UNSPECIFIED
- FREQUENCY OF MICTURITION
- PAINFUL MICTURITION UNSPECIFIED
 
DIAGNOSES:
0. Frequency of micturition
1. Urinary tract infection, site not specified
5. Dorsalgia, unspecified
6. Type 2 diabetes mellitus with hyperglycemia
7. Type 2 diabetes mellitus with diabetic chronic kidney disease
8. End stage renal disease
9. Dependence on renal dialysis
10. Shortness of breath
11. Long term (current) use of anticoagulants
12. Long term (current) use of aspirin
13. Other long term (current) drug therapy
14. Personal history of nicotine dependence
 
15. Presence of cardiac pacemaker
16. Presence of aortocoronary bypass graft
-------------------------------------------------------------------------------------
10/04/2018 14:42
[facility]     [place]
 
TYPE: Emergency
 
COMPLAINT:
- CHEST PAIN
-------------------------------------------------------------------------------------
09/14/2018 19:39
[facility] [place]
 
TYPE: Emergency
 
COMPLAINT:
- CHEST PAIN, SHORT OF BREATH
-------------------------------------------------------------------------------------
08/30/2018 15:20
[facility]     [place]
 
TYPE: Emergency
 
COMPLAINT:
- INFECTION ON LEFT FOOT
-------------------------------------------------------------------------------------
Plus 5 More Visits
-------------------------------------------------------------------------------------
 
 
INPATIENT VISIT TRACKING (12 MO.)
-------------------------------------------------------------------------------------
03/26/2019 11:56
[facility]
 
TYPE: Medical Surgical
 
COMPLAINT:
- ACUTE CP, CHRONIC RENAL FAILURE, ANEMIA
 
DIAGNOSES:
0. Pleural effusion, not elsewhere classified
1. 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
chronic kidney disease, or end stage renal disease
2. End stage renal disease
3. Acute on chronic diastolic (congestive) heart failure
4. Acute kidney failure, unspecified
5. Chronic kidney disease, unspecified
6. Anemia, unspecified
7. Type 2 diabetes mellitus with diabetic chronic kidney disease
8. Atherosclerotic heart disease of native coronary artery without angina pectoris
9. Type 2 diabetes mellitus with diabetic neuropathy, unspecified
10. Unspecified atrial fibrillation
11. Restless legs syndrome
12. Unspecified urinary incontinence
13. Patient's noncompliance with dietary regimen
14. Presence of cardiac pacemaker
15. Presence of aortocoronary bypass graft
16. Dependence on renal dialysis
 
17. Patient's noncompliance with renal dialysis
18. History of falling
19. Long term (current) use of insulin
-------------------------------------------------------------------------------------
01/23/2019 13:01
[facility]
 
[facility]
 
DIAGNOSES:
- End stage renal disease
- Anemia in chronic kidney disease
- Dependence on renal dialysis
- Other disorders of plasma-protein metabolism, not elsewhere classified
- Other specified personal risk factors, not elsewhere classified
- Peripheral vascular disease, unspecified
- Foot Pain
-------------------------------------------------------------------------------------
12/27/2018 09:45
[facility]
 
TYPE: Recovery
 
DIAGNOSES:
- End stage renal disease
- Peripheral arterial disease, osteomyelitis left foot
- Other acute osteomyelitis, left ankle and foot
- Long term (current) use of antibiotics
- Anemia in chronic kidney disease
-------------------------------------------------------------------------------------
12/05/2018 07:[address]
[facility]
 
TYPE: General Medicine
 
DIAGNOSES:
- End stage renal disease
- Peripheral vascular disease, unspecified
- Dependence on renal dialysis
- Long term (current) use of insulin
- Other chronic osteomyelitis, left ankle and foot
- Type 2 diabetes mellitus with diabetic chronic kidney disease
- Essential (primary) hypertension
-------------------------------------------------------------------------------------
11/29/2018 11:[address]
[facility]
 
TYPE: Inpatient
 
DIAGNOSES:
- Upper GIB
- Other specified personal risk factors, not elsewhere classified
- Chronic systolic (congestive) heart failure
- Other disorders of phosphorus metabolism
- Anemia in chronic kidney disease
- Other disorders of plasma-protein metabolism, not elsewhere classified
- Moderate protein-calorie malnutrition
- End stage renal disease
- Other chronic osteomyelitis, unspecified ankle and foot
-------------------------------------------------------------------------------------
 
11/27/2018 18:05
[facility]
 
TYPE: Medical Surgical
 
COMPLAINT:
- SOB
 
DIAGNOSES:
0. Type 2 diabetes mellitus with foot ulcer
1. Type 2 diabetes mellitus with other specified complication
2. Urinary tract infection, site not specified
3. Unspecified protein-calorie malnutrition
4. Other chronic osteomyelitis, unspecified site
5. 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
chronic kidney disease, or end stage renal disease
6. Chronic diastolic (congestive) heart failure
7. Other osteomyelitis, ankle and foot
8. Type 2 diabetes mellitus with foot ulcer
9. Atherosclerotic heart disease of native coronary artery without angina pectoris
10. Chronic obstructive pulmonary disease, unspecified
11. Other malaise
12. Type 2 diabetes mellitus with hyperglycemia
13. Type 2 diabetes mellitus with diabetic chronic kidney disease
14. Type 2 diabetes mellitus with diabetic peripheral angiopathy without gangrene
15. Unspecified atrial fibrillation
16. Type 2 diabetes mellitus with diabetic neuropathy, unspecified
17. End stage renal disease
18. Dependence on renal dialysis
19. Long term (current) use of insulin
20. Allergy status to penicillin
21. Allergy status to other drugs, medicaments and biological substances status
22. Personal history of nicotine dependence
23. Anemia in chronic kidney disease
24. Other disorders of phosphorus metabolism
25. Other disorders of plasma-protein metabolism, not elsewhere classified
26. Gout, unspecified
27. Gastro-esophageal reflux disease without esophagitis
28. Old myocardial infarction
29. Presence of aortocoronary bypass graft
-------------------------------------------------------------------------------------
11/05/2018 14:39
[facility]
 
TYPE: Medical Surgical
 
COMPLAINT:
- RML PNEUMONIA SYNCOPE COLLAPSE
 
DIAGNOSES:
0. Syncope and collapse
1. Benign paroxysmal vertigo, unspecified ear
2. End stage renal disease
3. Hypertensive chronic kidney disease with stage 5 chronic kidney disease or end
stage renal disease
4. Urinary tract infection, site not specified
5. 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
chronic kidney disease, or end stage renal disease
6. Kidney transplant status
7. Unspecified systolic (congestive) heart failure
 
8. Syncope and collapse
9. Type 2 diabetes mellitus with diabetic chronic kidney disease
10. Atherosclerotic heart disease of native coronary artery without angina pectoris
11. Nicotine dependence, cigarettes, uncomplicated
12. Anemia, unspecified
13. Other disorders of plasma-protein metabolism, not elsewhere classified
14. Other disorders of phosphorus metabolism
15. Type 2 diabetes mellitus with diabetic polyneuropathy
16. Other specified bacterial agents as the cause of diseases classified elsewhere
17. Paroxysmal atrial fibrillation
18. Presence of aortocoronary bypass graft
19. Long term (current) use of insulin
-------------------------------------------------------------------------------------
09/15/2018 00:38
[facility]
 
TYPE: [facility]
 
DIAGNOSES:
- Long term (current) use of insulin
- Dependence on renal dialysis
- End stage renal disease
- Type 2 diabetes mellitus with diabetic chronic kidney disease
- Essential (primary) hypertension
- Acute ischemic heart disease, unspecified
- Anemia in chronic kidney disease
-------------------------------------------------------------------------------------
04/27/2018 18:23
[facility]
 
TYPE: Medical Surgical
 
COMPLAINT:
- SOB, UTI, RENAL FAILURE
 
DIAGNOSES:
0. Cough
1. Pneumonia due to Pseudomonas
2. End stage renal disease
3. 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
chronic kidney disease, or end stage renal disease
4. Cachexia
5. Chronic obstructive pulmonary disease with acute lower respiratory infection
6. Type 2 diabetes mellitus with diabetic chronic kidney disease
7. Heart failure, unspecified
8. Hyperlipidemia, unspecified
9. Gastro-esophageal reflux disease without esophagitis
10. Atherosclerotic heart disease of native coronary artery without angina pectoris
11. Major depressive disorder, single episode, unspecified
12. Anxiety disorder, unspecified
13. Type 2 diabetes mellitus with diabetic polyneuropathy
14. Unspecified atrial fibrillation
15. Pneumonia due to Methicillin resistant Staphylococcus aureus
16. Nosocomial condition
17. BACTERIURIA
17. Abnormal coagulation profile
18. Abnormal coagulation profile
18. Adverse effect of anticoagulants, initial encounter
19. Anemia in chronic kidney disease
19. Adverse effect of anticoagulants, initial encounter
 
20. Anemia in chronic kidney disease
20. Other disorders of plasma-protein metabolism, not elsewhere classified
21. Exposure to other specified factors, initial encounter
21. Other disorders of plasma-protein metabolism, not elsewhere classified
22. Exposure to other specified factors, initial encounter
22. Activity, unspecified
23. Activity, unspecified
23. Other abnormal findings in urine
24. Presence of aortocoronary bypass graft
25. Presence of prosthetic heart valve
26. Long term (current) use of insulin
27. Dependence on renal dialysis
28. Unspecified external cause status
29. Old myocardial infarction
30. Acquired absence of both cervix and uterus
31. Acquired absence of other specified parts of digestive tract
32. Allergy status to narcotic agent status
33. Allergy status to penicillin
34. Allergy status to other drugs, medicaments and biological substances status
35. Long term (current) use of anticoagulants
36. Long term (current) use of aspirin
37. Body mass index (BMI) 23.0-23.9, adult
-------------------------------------------------------------------------------------
 
[email or web address]

## 2019-04-09 NOTE — XMS
Encounter Summary
  Created on: 2019
 
 Cherie Villalobos
 External Reference #: [id number]
 : 48
 Sex: Female
 
 Demographics
 
 
+-----------------------+--------------------------+
| Address               | [address]               |
|                       | [place]  [address] |
+-----------------------+--------------------------+
| Home Phone            | [phone]          |
+-----------------------+--------------------------+
| Preferred Language    | Unknown                  |
+-----------------------+--------------------------+
| Marital Status        |                   |
+-----------------------+--------------------------+
| Am[employer] Affiliation | 1013                     |
+-----------------------+--------------------------+
| Race                  | Unknown                  |
+-----------------------+--------------------------+
| Ethnic Group          | Unknown                  |
+-----------------------+--------------------------+
 
 
 Author
 
 
+--------------+-----------------------+
| Author       | [facility] [employer] |
+--------------+-----------------------+
| Organization | [facility] |
+--------------+-----------------------+
| Address      | Unknown               |
+--------------+-----------------------+
| Phone        | Unavailable           |
+--------------+-----------------------+
 
 
 
 Support
 
 
+---------------+--------------+---------------------+-----------------+
| Name          | Relationship | Address             | Phone           |
+---------------+--------------+---------------------+-----------------+
| Anoop Villalobos | ECON         | [place], | [phone] |
|               |              |  [place]  [address]     |                 |
+---------------+--------------+---------------------+-----------------+
| Jennifer Dickson | ECON         | [place]       | [phone] |
|               |              | [address]               |                 |
+---------------+--------------+---------------------+-----------------+
 
 
 
 
 Care Team Providers
 
 
+-----------------------+------+-----------------+
| Care Team Member Name | Role | Phone           |
+-----------------------+------+-----------------+
| Ivy Couch DO      | PCP  | [phone] |
+-----------------------+------+-----------------+
 
 
 
 Reason for Visit
 
 
+--------+------------------------------------------------------+
| Reason | Comments                                             |
+--------+------------------------------------------------------+
| Other  | [facility] Round[facility] Note |
+--------+------------------------------------------------------+
 
 
 
 Encounter Details
 
 
+--------+-------------+----------------------+----------------------+----------------------
+
| Date   | Type        | Department           | Care Team            | Description          
|
+--------+-------------+----------------------+----------------------+----------------------
+
| / | Documentati |   [facility]      |   João Asher MD  | Other (February      
|
|    | on Only     | [address]        |  [address]   | 2019-[facility] Provider 
|
|        |             | [address]   | [address]  [place], [place]    |  Dialysis Rounding   
|
|        |             | [place] [address]   | [address] [phone]  | Note)                
|
|        |             | [phone]         |  [phone] (Fax)  |                      
|
+--------+-------------+----------------------+----------------------+----------------------
+
 
 
 
 Social History
 
 
+---------------+------------+-----------+--------+------------------+
| Tobacco Use   | Types      | Packs/Day | Years  | Date             |
|               |            |           | Used   |                  |
+---------------+------------+-----------+--------+------------------+
| Former Smoker | Cigarettes | 1         | 30     | Quit: 2004 |
+---------------+------------+-----------+--------+------------------+
 
 
 
+---------------------+---+---+---+
 
| Smokeless Tobacco:  |   |   |   |
| Never Used          |   |   |   |
+---------------------+---+---+---+
 
 
 
+------------------------------+
| Comments: quite smoking  |
+------------------------------+
 
 
 
+-------------+-----------+---------+----------+
| Alcohol Use | Drinks/We | oz/Week | Comments |
|             | ek        |         |          |
+-------------+-----------+---------+----------+
| No          |           |         |          |
+-------------+-----------+---------+----------+
 
 
 
+------------------+---------------+
| Sex Assigned at  | Date Recorded |
| Birth            |               |
+------------------+---------------+
| Not on file      |               |
+------------------+---------------+
 as of this encounter
 
 Plan of Treatment
 
 
+--------+---------+-----------+----------------------+-------------+
| Date   | Type    | Specialty | Care Team            | Description |
+--------+---------+-----------+----------------------+-------------+
| 04/10/ | Office  | Podiatry  |   Srinivasan Jordan,  |             |
|    | Visit   |           | DPM  [address], |             |
|        |         |           |  [address]  [place],  |             |
|        |         |           | [place] [address]             |             |
|        |         |           | [phone]         |             |
|        |         |           | [phone] (Fax)   |             |
+--------+---------+-----------+----------------------+-------------+
 as of this encounter
 
 Visit Diagnoses
 Not on filein this encounter

## 2019-04-09 NOTE — XMS
Encounter Summary
  Created on: 2019
 
 Cherie Villalobos
 External Reference #: [id number]
 : 48
 Sex: Female
 
 Demographics
 
 
+-----------------------+--------------------------+
| Address               | [address]               |
|                       | [place]  [address] |
+-----------------------+--------------------------+
| Home Phone            | [phone]          |
+-----------------------+--------------------------+
| Preferred Language    | Unknown                  |
+-----------------------+--------------------------+
| Marital Status        |                   |
+-----------------------+--------------------------+
| Episcopal Affiliation | 1013                     |
+-----------------------+--------------------------+
| Race                  | Unknown                  |
+-----------------------+--------------------------+
| Ethnic Group          | Unknown                  |
+-----------------------+--------------------------+
 
 
 Author
 
 
+--------------+--------------------------------------------+
| Author       | [facility]  |
|              | [facility] [place]                                |
+--------------+--------------------------------------------+
| Organization | [facility]  |
|              | [facility] [place]                                |
+--------------+--------------------------------------------+
| Address      | Unknown                                    |
+--------------+--------------------------------------------+
| Phone        | Unavailable                                |
+--------------+--------------------------------------------+
 
 
 
 Support
 
 
+---------------+--------------+---------------------+-----------------+
| Name          | Relationship | Address             | Phone           |
+---------------+--------------+---------------------+-----------------+
| Anoop Villalobos | ECON         | [address], | [phone] |
|               |              |  [place]  [address]     |                 |
+---------------+--------------+---------------------+-----------------+
| Jennifer Dickson | ECON         | [place], [place]       | [phone] |
|               |              | [address]               |                 |
+---------------+--------------+---------------------+-----------------+
 
 
 
 
 Care Team Providers
 
 
+--------------------------+------+-----------------+
| Care Team Member Name    | Role | Phone           |
+--------------------------+------+-----------------+
| Araseli Montelongo Activity  | PCP  | [phone] |
| Professional             |      |                 |
+--------------------------+------+-----------------+
 
 
 
 Reason for Visit
 
 
+--------+--------------+
| Reason | Comments     |
+--------+--------------+
| Other  | heart issues |
+--------+--------------+
 
 
 
 Encounter Details
 
 
+--------+-----------+----------------------+----------------------+----------------------+
| Date   | Type      | Department           | Care Team            | Description          |
+--------+-----------+----------------------+----------------------+----------------------+
| / | Telephone |   [facility]          |   Abhijit,        | Other (heart issues) |
| 2019   |           | CARDIOLOGY  [address]    | [address]TE [address]  [address]  |                      |
|        |           | [address]  [place], | [place]  [place], |                      |
|        |           |  [place] [address]       |  [place] [address]       |                      |
|        |           | [phone]         | [phone]         |                      |
|        |           |                      | [phone] (Fax)   |                      |
+--------+-----------+----------------------+----------------------+----------------------+
 
 
 
 Social History
 
 
+---------------+------------+-----------+--------+------------------+
| Tobacco Use   | Types      | Packs/Day | Years  | Date             |
|               |            |           | Used   |                  |
+---------------+------------+-----------+--------+------------------+
| Former Smoker | Cigarettes | 1         | 30     | Quit: 2004 |
+---------------+------------+-----------+--------+------------------+
 
 
 
+---------------------+---+---+---+
| Smokeless Tobacco:  |   |   |   |
| Never Used          |   |   |   |
+---------------------+---+---+---+
 
 
 
 
+-------------+-----------+---------+----------+
| Alcohol Use | Drinks/We | oz/Week | Comments |
|             | ek        |         |          |
+-------------+-----------+---------+----------+
| No          |           |         |          |
+-------------+-----------+---------+----------+
 
 
 
+------------------+---------------+
| Sex Assigned at  | Date Recorded |
| Birth            |               |
+------------------+---------------+
| Not on file      |               |
+------------------+---------------+
 
 
 
+----------------+-------------+-------------+
| Job Start Date | Occupation  | Industry    |
+----------------+-------------+-------------+
| Not on file    | Not on file | Not on file |
+----------------+-------------+-------------+
 
 
 
+----------------+--------------+------------+
| Travel History | Travel Start | Travel End |
+----------------+--------------+------------+
 
 
 
+-------------------------------------+
| No recent travel history available. |
+-------------------------------------+
 documented as of this encounter
 
 Functional Status
 
 
+---------------------------------------------+----------+--------------------+
| Functional Status                           | Response | Date of Assessment |
+---------------------------------------------+----------+--------------------+
| Are you deaf or do you have serious         | No       | 2018         |
| difficulty hearing?                         |          |                    |
+---------------------------------------------+----------+--------------------+
| Are you blind or do you have serious        | No       | 2018         |
| difficulty seeing, even when wearing        |          |                    |
| glasses?                                    |          |                    |
+---------------------------------------------+----------+--------------------+
| Do you have serious difficulty walking or   | No       | 2018         |
| climbing stairs? (5 years old or older)     |          |                    |
+---------------------------------------------+----------+--------------------+
| Do you have difficulty dressing or bathing? | No       | 2018         |
|  (5 years old or older)                     |          |                    |
+---------------------------------------------+----------+--------------------+
| Because of a physical, mental, or emotional | No       | 2018         |
|  condition, do you have difficulty doing    |          |                    |
| errands alone such as visiting a doctor's   |          |                    |
 
| office or shopping? [15 years old or        |          |                    |
| older)]                                     |          |                    |
+---------------------------------------------+----------+--------------------+
 
 
 
+---------------------------------------------+----------+--------------------+
| Cognitive Status                            | Response | Date of Assessment |
+---------------------------------------------+----------+--------------------+
| Because of a physical, mental, or emotional | No       | 2018         |
|  condition, do you have serious difficulty  |          |                    |
| concentrating, remembering, or making       |          |                    |
| decisions? (5 years old or older)           |          |                    |
+---------------------------------------------+----------+--------------------+
 documented as of this encounter
 
 Plan of Treatment
 
 
+--------+---------+------------+----------------------+-------------+
| Date   | Type    | Specialty  | Care Team            | Description |
+--------+---------+------------+----------------------+-------------+
| / | Office  | Cardiology |   Johnie John, |             |
|    | Visit   |            |  MD  [address] |             |
|        |         |            |  [place],   |             |
|        |         |            | [place] [address]             |             |
|        |         |            | [phone]         |             |
|        |         |            | [phone] (Fax)   |             |
+--------+---------+------------+----------------------+-------------+
 documented as of this encounter
 
 Visit Diagnoses
 Not on filedocumented in this encounter

## 2019-04-09 NOTE — XMS
Encounter Summary
  Created on: 2019
 
 Cherie Villalobos
 External Reference #: [id number]
 : 48
 Sex: Female
 
 Demographics
 
 
+-----------------------+--------------------------+
| Address               | [address]               |
|                       | [place]  [address] |
+-----------------------+--------------------------+
| Home Phone            | [phone]          |
+-----------------------+--------------------------+
| Preferred Language    | Unknown                  |
+-----------------------+--------------------------+
| Marital Status        |                   |
+-----------------------+--------------------------+
| Lutheran Affiliation | 1013                     |
+-----------------------+--------------------------+
| Race                  | Unknown                  |
+-----------------------+--------------------------+
| Ethnic Group          | Unknown                  |
+-----------------------+--------------------------+
 
 
 Author
 
 
+--------------+-----------------------+
| Author       | [email or web address] |
+--------------+-----------------------+
| Organization | [facility] |
+--------------+-----------------------+
| Address      | Unknown               |
+--------------+-----------------------+
| Phone        | Unavailable           |
+--------------+-----------------------+
 
 
 
 Support
 
 
+---------------+--------------+---------------------+-----------------+
| Name          | Relationship | Address             | Phone           |
+---------------+--------------+---------------------+-----------------+
| Anoop Villalobos | ECON         | [place], | [phone] |
|               |              |  [place]  [address]     |                 |
+---------------+--------------+---------------------+-----------------+
| Jennifer Dickson | ECON         | [place]       | [phone] |
|               |              | [address]               |                 |
+---------------+--------------+---------------------+-----------------+
 
 
 
 
 Care Team Providers
 
 
+-----------------------+------+-----------------+
| Care Team Member Name | Role | Phone           |
+-----------------------+------+-----------------+
| Ivy Couch DO      | PCP  | [phone] |
+-----------------------+------+-----------------+
 
 
 
 Encounter Details
 
 
+--------+------------+----------------------+-----------+-------------+
| Date   | Type       | Department           | Care Team | Description |
+--------+------------+----------------------+-----------+-------------+
| / | Procedure  |   [facility]    |           |             |
| 2019   | Pass       | [facility] [address]   |           |             |
|        |            | [address] |           |             |
|        |            |   [address] [facility]     |           |             |
|        |            | [place] [address]   |           |             |
|        |            | [phone]         |           |             |
+--------+------------+----------------------+-----------+-------------+
 
 
 
 Social History
 
 
+---------------+------------+-----------+--------+------------------+
| Tobacco Use   | Types      | Packs/Day | Years  | Date             |
|               |            |           | Used   |                  |
+---------------+------------+-----------+--------+------------------+
| Former Smoker | Cigarettes | 1         | 30     | Quit: 2004 |
+---------------+------------+-----------+--------+------------------+
 
 
 
+---------------------+---+---+---+
| Smokeless Tobacco:  |   |   |   |
| Never Used          |   |   |   |
+---------------------+---+---+---+
 
 
 
+------------------------------+
| Comments: quite smoking  |
+------------------------------+
 
 
 
+-------------+-----------+---------+----------+
| Alcohol Use | Drinks/We | oz/Week | Comments |
|             | ek        |         |          |
+-------------+-----------+---------+----------+
| No          |           |         |          |
+-------------+-----------+---------+----------+
 
 
 
 
+------------------+---------------+
| Sex Assigned at  | Date Recorded |
| Birth            |               |
+------------------+---------------+
| Not on file      |               |
+------------------+---------------+
 as of this encounter
 
 Plan of Treatment
 
 
+--------+---------+-----------+----------------------+-------------+
| Date   | Type    | Specialty | Care Team            | Description |
+--------+---------+-----------+----------------------+-------------+
| 04/10/ | Office  | Podiatry  |   Srinivasan Jordan,  |             |
| 2019   | Visit   |           | DPM  [address], |             |
|        |         |           |  [place],  |             |
|        |         |           | [place] [address]             |             |
|        |         |           | [phone]         |             |
|        |         |           | [phone] (Fax)   |             |
+--------+---------+-----------+----------------------+-------------+
 as of this encounter
 
 Visit Diagnoses
 Not on filein this encounter

## 2019-04-09 NOTE — XMS
Encounter Summary
  Created on: 2019
 
 Cherie Villalobos
 External Reference #: [id number]
 : 48
 Sex: Female
 
 Demographics
 
 
+-----------------------+--------------------------+
| Address               | [address]               |
|                       | [place]  [address] |
+-----------------------+--------------------------+
| Home Phone            | [phone]          |
+-----------------------+--------------------------+
| Preferred Language    | Unknown                  |
+-----------------------+--------------------------+
| Marital Status        |                   |
+-----------------------+--------------------------+
| Confucianism Affiliation | 1013                     |
+-----------------------+--------------------------+
| Race                  | Unknown                  |
+-----------------------+--------------------------+
| Ethnic Group          | Unknown                  |
+-----------------------+--------------------------+
 
 
 Author
 
 
+--------------+-----------------------+
| Author       | [employer] |
+--------------+-----------------------+
| Organization | [facility] |
+--------------+-----------------------+
| Address      | Unknown               |
+--------------+-----------------------+
| Phone        | Unavailable           |
+--------------+-----------------------+
 
 
 
 Support
 
 
+---------------+--------------+---------------------+-----------------+
| Name          | Relationship | Address             | Phone           |
+---------------+--------------+---------------------+-----------------+
| Anoop Villalobos | ECON         | [place], | [phone] |
|               |              |  [place]  [address]     |                 |
+---------------+--------------+---------------------+-----------------+
| Jennifer Dickson | ECON         | [place]       | [phone] |
|               |              | [phone]               |                 |
+---------------+--------------+---------------------+-----------------+
 
 
 
 
 Care Team Providers
 
 
+-----------------------+------+-----------------+
| Care Team Member Name | Role | Phone           |
+-----------------------+------+-----------------+
| Ivy Couch DO      | PCP  | [phone] |
+-----------------------+------+-----------------+
 
 
 
 Reason for Visit
 
 
+--------+---------------------------------------------------+
| Reason | Comments                                          |
+--------+---------------------------------------------------+
| Other  | Diagnosis request sent to [facility] |
+--------+---------------------------------------------------+
 
 
 
 Encounter Details
 
 
+--------+-------------+----------------------+---------------+-------------------+
| Date   | Type        | Department           | Care Team     | Description       |
+--------+-------------+----------------------+---------------+-------------------+
| 01/15/ | Documentati |   [facility]      |   Rl,  | Other (Diagnosis  |
| 2019   | on Only     | [place]  [address]    | [place] [address]  | request sent to   |
|        |             | [address]    |               | [facility]        |
|        |             | [place] [address]  |               | confirmation)     |
|        |             |  [phone]        |               |                   |
+--------+-------------+----------------------+---------------+-------------------+
 
 
 
 Social History
 
 
+---------------+------------+-----------+--------+------------------+
| Tobacco Use   | Types      | Packs/Day | Years  | Date             |
|               |            |           | Used   |                  |
+---------------+------------+-----------+--------+------------------+
| Former Smoker | Cigarettes | 1         | 30     | Quit: 2004 |
+---------------+------------+-----------+--------+------------------+
 
 
 
+---------------------+---+---+---+
| Smokeless Tobacco:  |   |   |   |
| Never Used          |   |   |   |
+---------------------+---+---+---+
 
 
 
+------------------------------+
| Comments: quite smoking  |
+------------------------------+
 
 
 
 
+-------------+-----------+---------+----------+
| Alcohol Use | Drinks/We | oz/Week | Comments |
|             | ek        |         |          |
+-------------+-----------+---------+----------+
| No          |           |         |          |
+-------------+-----------+---------+----------+
 
 
 
+------------------+---------------+
| Sex Assigned at  | Date Recorded |
| Birth            |               |
+------------------+---------------+
| Not on file      |               |
+------------------+---------------+
 as of this encounter
 
 Plan of Treatment
 
 
+--------+---------+-----------+----------------------+-------------+
| Date   | Type    | Specialty | Care Team            | Description |
+--------+---------+-----------+----------------------+-------------+
| 04/10/ | Office  | Podiatry  |   Srinivasan Jordan,  |             |
|    | Visit   |           | DPM  [address], |             |
|        |         |           |  [address]  [place],  |             |
|        |         |           | [place] [address]             |             |
|        |         |           | [phone]         |             |
|        |         |           | [phone] (Fax)   |             |
+--------+---------+-----------+----------------------+-------------+
 as of this encounter
 
 Visit Diagnoses
 Not on filein this encounter

## 2019-04-09 NOTE — XMS
Encounter Summary
  Created on: 2019
 
 Cherie Villalobos
 External Reference #: [id number]
 : 48
 Sex: Female
 
 Demographics
 
 
+-----------------------+--------------------------+
| Address               | [address]               |
|                       | [place]  [address] |
+-----------------------+--------------------------+
| Home Phone            | [phone]          |
+-----------------------+--------------------------+
| Preferred Language    | Unknown                  |
+-----------------------+--------------------------+
| Marital Status        |                   |
+-----------------------+--------------------------+
| Orthodox Affiliation | 1013                     |
+-----------------------+--------------------------+
| Race                  | Unknown                  |
+-----------------------+--------------------------+
| Ethnic Group          | Unknown                  |
+-----------------------+--------------------------+
 
 
 Author
 
 
+--------------+-----------------------+
| Author       | [employer] |
+--------------+-----------------------+
| Organization | [facility] |
+--------------+-----------------------+
| Address      | Unknown               |
+--------------+-----------------------+
| Phone        | Unavailable           |
+--------------+-----------------------+
 
 
 
 Support
 
 
+---------------+--------------+---------------------+-----------------+
| Name          | Relationship | Address             | Phone           |
+---------------+--------------+---------------------+-----------------+
| Anoop Villalobos | ECON         | [place], | [phone] |
|               |              |  [place]  [address]     |                 |
+---------------+--------------+---------------------+-----------------+
| Jennifer Dickson | ECON         | [place]       | [phone] |
|               |              | [address]               |                 |
+---------------+--------------+---------------------+-----------------+
 
 
 
 
 Care Team Providers
 
 
+-----------------------+------+-----------------+
| Care Team Member Name | Role | Phone           |
+-----------------------+------+-----------------+
| Ivy Couch DO      | PCP  | [phone] |
+-----------------------+------+-----------------+
 
 
 
 Reason for Visit
 
 
+-----------+----------------------------------+
| Reason    | Comments                         |
+-----------+----------------------------------+
| Follow-up | PAD (peripheral artery disease)  |
+-----------+----------------------------------+
 
 
 
 Encounter Details
 
 
+--------+---------+----------------------+----------------------+----------------------+
| Date   | Type    | Department           | Care Team            | Description          |
+--------+---------+----------------------+----------------------+----------------------+
| / | Office  |   [facility]      |   Kirt Mclaughlin MD     | Steal syndrome as    |
| 2019   | Visit   | Vascular Surgery     | [address]  | complication of      |
|        |         | [address] |  [place] [address]  | dialysis access,     |
|        |         |  E  [place], [place]     |  [phone]        | sequela (Primary     |
|        |         | [address]           | [phone] (Fax)   | Dx); ESRD (end stage |
|        |         | [phone]         |                      |  renal disease)      |
|        |         |                      |                      | (HCC); PAD           |
|        |         |                      |                      | (peripheral artery   |
|        |         |                      |                      | disease) ([facility])       |
+--------+---------+----------------------+----------------------+----------------------+
 
 
 
 Social History
 
 
+---------------+------------+-----------+--------+------------------+
| Tobacco Use   | Types      | Packs/Day | Years  | Date             |
|               |            |           | Used   |                  |
+---------------+------------+-----------+--------+------------------+
| Former Smoker | Cigarettes | 1         | 30     | Quit: 2004 |
+---------------+------------+-----------+--------+------------------+
 
 
 
+---------------------+---+---+---+
| Smokeless Tobacco:  |   |   |   |
| Never Used          |   |   |   |
+---------------------+---+---+---+
 
 
 
 
+------------------------------+
| Comments: quite smoking  |
+------------------------------+
 
 
 
+-------------+-----------+---------+----------+
| Alcohol Use | Drinks/We | oz/Week | Comments |
|             | ek        |         |          |
+-------------+-----------+---------+----------+
| No          |           |         |          |
+-------------+-----------+---------+----------+
 
 
 
+------------------+---------------+
| Sex Assigned at  | Date Recorded |
| Birth            |               |
+------------------+---------------+
| Not on file      |               |
+------------------+---------------+
 as of this encounter
 
 Last Filed Vital Signs
 
 
+-------------------+---------+-------------------------+
| Vital Sign        | Reading | Time Taken              |
+-------------------+---------+-------------------------+
| Blood Pressure    | 130/60  | 2019  2:36 PM PST |
+-------------------+---------+-------------------------+
| Pulse             | 77      | 2019  2:36 PM PST |
+-------------------+---------+-------------------------+
| Temperature       | -       | -                       |
+-------------------+---------+-------------------------+
| Respiratory Rate  | -       | -                       |
+-------------------+---------+-------------------------+
| Oxygen Saturation | 100%    | 2019  2:36 PM PST |
+-------------------+---------+-------------------------+
| Inhaled Oxygen    | -       | -                       |
| Concentration     |         |                         |
+-------------------+---------+-------------------------+
| Weight            | -       | -                       |
+-------------------+---------+-------------------------+
| Height            | -       | -                       |
+-------------------+---------+-------------------------+
| Body Mass Index   | -       | -                       |
+-------------------+---------+-------------------------+
 in this encounter
 
 Progress Notes
 Kirt Mclaughlin MD - 2019  3:00 PM PSTFormatting of this note may be different from the o
aleksandar.
 Ms. Villalobos is a pleasant 68 y.o. female with PMH significant for acute MI, anemia, atrial f
ibrillation, COPD, CAD, diabetes, ESRD, hyperlipidemia, and hypertension, who presents today
 for wound follow up. Patient states she was in the hospital recently, she had an XR of her 
right great toe which showed a fracture. The area is healing. She also complains of pain and
 some discoloration of her left 2nd finger. Patient reports she has been having continued pa
in in her right toe, and at the site of her left forefoot amputation. Patient adds that she 
 
has not been able to follow up with her primary care physician as she is only in the office 
on , and the weather has made it difficult to get into town. 
 
 Results: X-ray foot right 3+ views 
 Impression 
 Linear lucency/irregularity at the distal 1st phalanx, may represent 
 minimally displaced fracture. 
 Signed by: Oleksandr Lemus 
 Sign Date/Time: 2019 10:20 AM 
 
 Past Medical History 
 Diagnosis Date 
   Acute MI ([facility])  
  with stenting x 1 
   Anemia associated with chronic renal failure 2016 
   Atrial fibrillation ([facility])  
   Chronic kidney disease  
   Congestive heart failure ([facility])  
   COPD (chronic obstructive pulmonary disease) ([facility])  
   COPD (chronic obstructive pulmonary disease) ([facility]) 2018 
   Coronary artery disease  
  stent placements: 3 total 
   Depression  
   Diabetes other 
  abstracted 
   Diabetes mellitus, type 2 ([facility])  
   ESRD on peritoneal dialysis ([facility])  
   GERD (gastroesophageal reflux disease)  
   Hemodialysis patient ([facility]) 10/2016 
  Stopped peritoneal and restarted hemodialysis 
   Hemorrhage of gastrointestinal tract, unspecified 2017 
  low GI, rectal bleeding 
   High blood pressure other 
  abstracted 
   High cholesterol other 
  abstracted 
   Hyperlipidemia  
   Hypertension  
   Hyponatremia 2017 
   Myocardial infarction ([facility]) 2017 
   Other chronic pain  
  feet,  
   Pain of left hip joint 2016 
  due to hip fracture and replacement 
   Partial small bowel obstruction ([facility]) 2017 
  resolved 
   Renal failure  
  Stage 5.   Fistula in the JAN - not on dialysis yet. 
   Unspecified visual disturbance  
  glasses 
 
 Past Surgical History 
 Procedure Laterality Date 
   AV FISTULA PLACEMENT   
  left upper arm AV fistula - failed 
   AV FISTULA REPAIR N/A 10/25/2016 
  Procedure: AV FISTULA - GRAFT REPAIR/REVISION;  Surgeon: Kirt Mclaughlin MD;  Location: [facility]
IN OR;  Service: Vascular;  Laterality: N/A;  Superficialization and revision of left arm fi
stula 
   CARDIAC CATHETERIZATION  2017 
 
   CARPAL TUNNEL RELEASE Bilateral other 
  abstracted 
   CATARACT EXTRACTION Bilateral  
   CATHETER REMOVAL N/A 2016 
  Procedure: DIALYSIS CATHETER - REMOVAL;  Surgeon: Kirt Mclaughlin MD;  Location: [facility] OR; 
 Service: Vascular;  Laterality: N/A;  PD cath removal 
   CHOLECYSTECTOMY  other 
  abstracted 
   COLONOSCOPY   
   CORONARY ARTERY BYPASS GRAFT   
   CORONARY STENT PLACEMENT  2017 
  Drug Elutin stents to OM, 1 stent to prox. LAD 
   EYE SURGERY   
   FOOT AMPUTATION Left 2018 
  Procedure: FOREFOOT - AMPUTATION;  Surgeon: Srinivasan Jordan DPM;  Location: [facility] MAIN OR;
  Service: Podiatry;  Laterality: Left; 
   HARDWARE PRESENT   
  stent placements x 3, Left hip replacement, vascular stents 2 in left leg 
   HIP ARTHROPLASTY Left 2016 
  Procedure: HIP - ARTHROPLASTY(ALLISON);  Surgeon: Uriel Roa MD;  Location: [facility] MAIN 
OR;  Service: Orthopedics;  Laterality: Left; 
   HYSTERECTOMY   
  complete 
   PACEMAKER INSERTION   
  [employer] pacemaker/defib 
   PERITONEAL CATHETER INSERTION  8/15/2013 
   PERITONEAL CATHETER INSERTION N/A 2016 
  Procedure: LAPAROSCOPIC - PERITONEAL DIALYSIS CATH INSERTION;  Surgeon: Kirt Mclaughlin MD;  Lo
cation: [facility] MAIN OR;  Service: Vascular;  Laterality: N/A;  Removal of old catheter 
   SHOULDER SURGERY Right  
  frozen shoulder 
   UNLISTED PROCEDURE ARTHROSCOPY   
  total Left hip 
   vascular stents Left 2017 
  leg (2 stents) 
   VASCULAR SURGERY   
 
 Social History 
 Substance Use Topics 
   Smoking status: Former Smoker 
   Packs/day: 1.00 
   Years: 30.00 
   Types: Cigarettes 
   Quit date: 2004 
   Smokeless tobacco: Never Used 
    Comment: quite smoking  
   Alcohol use No 
 
 Family History 
 Problem Relation Age of Onset 
   High cholesterol Father  
   Hypertension Father  
   Diabetes Paternal Grandmother  
   Maljoann hypertherm Neg Hx  
   Kidney disease Neg Hx  
 
 Current Outpatient Prescriptions on File Prior to Visit 
 Medication Sig Dispense Refill 
   albuterol (PROVENTIL HFA;VENTOLIN HFA) 108 (90 Base) MCG/ACT inhaler Inhale 2 puffs int
o the lungs every 4 (four) hours as needed for Wheezing.   
 
   apixaban (ELIQUIS) 2.5 MG tablet Take 1 tablet by mouth 2 (two) times daily. 60 tablet 
0 
   atorvastatin (LIPITOR) 80 MG tablet Take 80 mg by mouth nightly.   
   carvedilol (COREG) 12.5 MG tablet Take 1 tablet by mouth 2 (two) times daily with meals
. 60 tablet 0 
   esomeprazole (NEXIUM) 40 MG capsule Take 1 capsule by mouth every day   
   HYDROcodone-acetaminophen (NORCO) 5-325 MG per tablet Take 1 tablet by mouth every 4 (f
our) hours as needed. 30 tablet 0 
   insulin aspart (NOVOLOG) 100 UNIT/ML injection Inject  into the skin 3 (three) times da
melina before meals. Sliding scale   
   insulin degludec (TRESIBA) 100 UNIT/ML injection Inject 21 units every night   
   isosorbide mononitrate (IMDUR) 60 MG 24 hr tablet Take 1 tablet by mouth daily. 30 tabl
et 1 
   LORazepam (ATIVAN) 1 MG tablet Take 0.5 tablets by mouth every 8 (eight) hours as neede
d for Anxiety. Patient states she has the 1mg tablets at home and that they are scored and s
he will split them in half   
   losartan (COZAAR) 100 MG tablet Take 100 mg by mouth daily.   
   nitroGLYCERIN (NITROSTAT) 0.4 MG SL tablet Place 1 tablet under the tongue every 5 (fiv
e) minutes as needed for Chest pain. 30 tablet 0 
   nortriptyline (PAMELOR) 25 MG capsule Take 25-75 mg by mouth See Admin Instructions. Ta
kes 25 mg by mouth every morning and 75 mg every night   
   ondansetron (ZOFRAN-ODT) 4 MG disintegrating tablet Take 4 mg by mouth every 8 (eight) 
hours as needed.   
   oxybutynin (DITROPAN) 5 MG tablet Take 7.5 mg by mouth 2 (two) times daily.   
   prochlorperazine 5 MG tablet TAKE ONE TABLET BY MOUTH TWICE DAILY AS NEEDED FOR NAUSEA 
60 tablet 11 
   rOPINIRole (REQUIP) 0.25 MG tablet Take 0.75 mg by mouth nightly.   
   TRINTELLIX 20 MG TABS Take 20 mg by mouth every evening.   
 
 No current facility-administered medications on file prior to visit.  
 
 Allergies 
 Allergen Reactions 
   Digitoxin Other (See Comments) 
   Toxic, patient states her eyes were also affected "she seen yellow everywhere" 
   Morphine Mental Changes, Other (See Comments) and Anxiety 
   "makes me feel jittery"
 Other reaction(s): MAKES HER "CRAZY"
 Hallucinations
 Make her crazy/ "funny feeling in my head"
 Has used hydromorphone in-house 
   Penicillin G Hives 
   Penicillins Rash and Hives 
   Other reaction(s): RASH
 Tolerates cephalosporins 
   Quinapril Hcl Anaphylaxis 
   Quinapril Hcl Angioedema 
   Facial Edema 
   Digoxin And Related Other (See Comments) 
   toxic 
   Metaxalone Other (See Comments) 
   Pantoprazole Sodium Nausea and Vomiting 
   Quinapril Hcl Edema 
   Facial Edema 
   Sudafed [Pseudoephedrine Hcl] Rash 
 
 Comprehensive ROS performed and pertinent items described in the HPI. 
 
 Vitals:reviewed
 CONSTITUTIONAL:  Conversant, well developed, NAD
 
 EYES: Anicteric sclerae, no lid drag, no proptosis
 RESP: Normal effort, regular, even, unlabored rate
 CV: No peripheral edema, rate regular
 SKIN: Pink, warm, dry without rash/lesion
 MS: ROM not limited, no digital cyanosis, normal gait
 NEURO: Cranial nerves II-XII grossly intact, A&O times 3
 PSYCH: appropriate affect, speech and tone, judgement and insight intact
 Vascular:  Left AV fistula with a strong palpable thrill.  Toe wound healing well.  
 
 Discussed with the patient that her toe appears to be healing and there is no need for vasc
ular intervention on her right lower extremity. Educated the patient that her finger pain is
 likely the result of her fistula diverting too much blood from her hand. I advised the sylvester
ent to do her best to prevent any injury to her hand, in order to prevent ulcerations. The p
atient will need a fistulogram in the near future, which she can call the office to schedule
. All questions and concerns addressed. Patient will follow up after a fistulogram. Patient 
understands and is agreeable. 
 
 Attending Note: Documentation assistance provided by [employer] Mike (David). Information r
ecorded by the [email or web address] has been reviewed and validated by me. I agree with its contents.
 
 Signed by: David Chavis 19, 3:07 PM
 
 Kirt Mclaughlin MD
 
 in this encounter
 
 Plan of Treatment
 
 
+--------+---------+-----------+----------------------+-------------+
| Date   | Type    | Specialty | Care Team            | Description |
+--------+---------+-----------+----------------------+-------------+
| 04/10/ | Office  | Podiatry  |   Srinivasan Jordan,  |             |
| 2019   | Visit   |           | DPM  [address], |             |
|        |         |           |  [address]  [place],  |             |
|        |         |           | [place] [address]             |             |
|        |         |           | [phone]         |             |
|        |         |           | [phone] (Fax)   |             |
+--------+---------+-----------+----------------------+-------------+
 as of this encounter
 
 Visit Diagnoses
 
 
+------------------------------------------------------------------------+
| Diagnosis                                                              |
+------------------------------------------------------------------------+
|   Steal syndrome as complication of dialysis access, sequela - Primary |
+------------------------------------------------------------------------+
|   ESRD (end stage renal disease) (HCC)                                 |
+------------------------------------------------------------------------+
|   End stage renal disease                                              |
+------------------------------------------------------------------------+
|   PAD (peripheral artery disease) (HCC)                                |
+------------------------------------------------------------------------+
|   Unspecified disorders of arteries and arterioles                     |
+------------------------------------------------------------------------+

## 2019-04-09 NOTE — XMS
Encounter Summary
  Created on: 2019
 
 Cherie Villalobos
 External Reference #: [id number]
 : 48
 Sex: Female
 
 Demographics
 
 
+-----------------------+--------------------------+
| Address               | [address]               |
|                       | [place]  [address] |
+-----------------------+--------------------------+
| Home Phone            | [phone]          |
+-----------------------+--------------------------+
| Preferred Language    | Unknown                  |
+-----------------------+--------------------------+
| Marital Status        |                   |
+-----------------------+--------------------------+
| Congregation Affiliation | 1013                     |
+-----------------------+--------------------------+
| Race                  | Unknown                  |
+-----------------------+--------------------------+
| Ethnic Group          | Unknown                  |
+-----------------------+--------------------------+
 
 
 Author
 
 
+--------------+-----------------------+
| Author       | [facility] [employer] |
+--------------+-----------------------+
| Organization | [facility] |
+--------------+-----------------------+
| Address      | Unknown               |
+--------------+-----------------------+
| Phone        | Unavailable           |
+--------------+-----------------------+
 
 
 
 Support
 
 
+---------------+--------------+---------------------+-----------------+
| Name          | Relationship | Address             | Phone           |
+---------------+--------------+---------------------+-----------------+
| Anoop Villalobos | ECON         | [place], | [phone] |
|               |              |  [place]  [address]     |                 |
+---------------+--------------+---------------------+-----------------+
| Jennifer Dickson | ECON         | [place]       | [phone] |
|               |              | [address]               |                 |
+---------------+--------------+---------------------+-----------------+
 
 
 
 
 Care Team Providers
 
 
+-----------------------+------+-----------------+
| Care Team Member Name | Role | Phone           |
+-----------------------+------+-----------------+
| Ivy Couch DO      | PCP  | [phone] |
+-----------------------+------+-----------------+
 
 
 
 Reason for Visit
 
 
+-----------+-------------------------------------------------------------------+
| Reason    | Comments                                                          |
+-----------+-------------------------------------------------------------------+
| Follow-up | Patient presents today for her 1st post op amputation of the left |
|           |  toes. Patient states she is doing well, she states she is ready  |
|           | to go home from the care facility.                                |
+-----------+-------------------------------------------------------------------+
 
 
 
 Encounter Details
 
 
+--------+---------+----------------------+----------------------+----------------------+
| Date   | Type    | Department           | Care Team            | Description          |
+--------+---------+----------------------+----------------------+----------------------+
| 01/10/ | Office  |   [facility] |   Srinivasan Jordan,  | S/P amputation of    |
| 2019   | Visit   |  and Ankle  [address]      | DPM  [address], | foot, left (HCC)     |
|        |         | [address]   |  [address]  [place],  | (Primary Dx); Type 2 |
|        |         | [place], [place]         | [place] [address]             |  diabetes mellitus   |
|        |         | [address]           | [phone]         | with chronic kidney  |
|        |         | [phone]         | [phone] (Fax)   | disease on chronic   |
|        |         |                      |                      | dialysis, with       |
|        |         |                      |                      | long-term current    |
|        |         |                      |                      | use of insulin (HCC) |
+--------+---------+----------------------+----------------------+----------------------+
 
 
 
 Social History
 
 
+---------------+------------+-----------+--------+------------------+
| Tobacco Use   | Types      | Packs/Day | Years  | Date             |
|               |            |           | Used   |                  |
+---------------+------------+-----------+--------+------------------+
| Former Smoker | Cigarettes | 1         | 30     | Quit: 2004 |
+---------------+------------+-----------+--------+------------------+
 
 
 
+---------------------+---+---+---+
| Smokeless Tobacco:  |   |   |   |
| Never Used          |   |   |   |
+---------------------+---+---+---+
 
 
 
 
+------------------------------+
| Comments: quite smoking  |
+------------------------------+
 
 
 
+-------------+-----------+---------+----------+
| Alcohol Use | Drinks/We | oz/Week | Comments |
|             | ek        |         |          |
+-------------+-----------+---------+----------+
| No          |           |         |          |
+-------------+-----------+---------+----------+
 
 
 
+------------------+---------------+
| Sex Assigned at  | Date Recorded |
| Birth            |               |
+------------------+---------------+
| Not on file      |               |
+------------------+---------------+
 as of this encounter
 
 Last Filed Vital Signs
 
 
+-------------------+---------+-------------------------+
| Vital Sign        | Reading | Time Taken              |
+-------------------+---------+-------------------------+
| Blood Pressure    | 115/54  | 01/10/2019  1:30 PM PST |
+-------------------+---------+-------------------------+
| Pulse             | 59      | 01/10/2019  1:30 PM PST |
+-------------------+---------+-------------------------+
| Temperature       | -       | -                       |
+-------------------+---------+-------------------------+
| Respiratory Rate  | 16      | 01/10/2019  1:30 PM PST |
+-------------------+---------+-------------------------+
| Oxygen Saturation | 93%     | 01/10/2019  1:30 PM PST |
+-------------------+---------+-------------------------+
| Inhaled Oxygen    | -       | -                       |
| Concentration     |         |                         |
+-------------------+---------+-------------------------+
| Weight            | -       | -                       |
+-------------------+---------+-------------------------+
| Height            | -       | -                       |
+-------------------+---------+-------------------------+
| Body Mass Index   | -       | -                       |
+-------------------+---------+-------------------------+
 in this encounter
 
 Progress Notes
 Srinivasan Jordan, DPM - 01/10/2019  1:30 PM PSTFormatting of this note may be different fro
m the original.
  
 Subjective: 
  Patient ID: Cherie Villalobos is a 68 y.o. female.
 Chief Complaint 
 
 Patient presents with 
   Follow-up 
   Patient presents today for her 1st post op amputation of the left toes. Patient states sh
e is doing well, she states she is ready to go home from the care facility.  
  
 
 HPI
 Patient returns today status post TMA left on 18.  Patient's pain is mild well contro
lled.  She think she is ready to return home, but does not have a plan for maintaining nonwe
ightbearing status at home. 
 
 The following portions of the patient's history were reviewed and updated as appropriate an
d is available elsewhere in the record: allergies, current medications, past family history,
 past medical history, past social history, past surgical history and problem list.
 
 ROS
 Denies any nausea, vomiting, fever, chills, shortness of breath, chest pain, or calf pain 
 
     
 Objective: 
 /54 (BP Location: Right upper arm, Patient Position: Sitting)  | Pulse 59  | Resp 16 
 | SpO2 93% 
 General observation:
 Constitutional: The patient is alert and oriented to person, place and time
 Psychiatric: The patient has normal affect and mood; her speech is normal; her behavior is 
normal.
 Respiratory: Effort normal and breath sounds normal. No accessory muscle usage. No respirat
ory distress.   
 
 Lower Extremity Examination:
 Neurovascular status is grossly intact. CFT is immediate to distal foot/toes. No dysvascula
r changes. 
 The incision(s) is(are) well coapted, without evidence of dehiscence - it(they) is(are) radha
an, dry and intact.  No evidence of dehiscence.
 There is no evidence of erythema, cellulitis, lymphangitis, drainage, malodor, or signs of 
infection. Normal skin temperature is appreciated.
 No skin mottling. No hyperesthesias or paresthesias.
 No calf or thigh pain. Negative Homans sign. 
 
 Radiographs: Normal post op appearance.  See epic chart for complete read
 
 X-ray Foot Left
 
 Result Date: 2018
 Amputation of the left forefoot at the level of the proximal metatarsals. Surgical cutaneou
s staples are present. No radiographic evidence for osteomyelitis. Normal alignment of the h
indfoot. Mild degeneration of the midfoot. Electronically signed by Oleksandr Lemus MD on 2018 10:12 AM
 
 Ct Head Without Contrast
 
 Result Date: 2018
 1.  No acute intracranial pathology. Electronically signed by Steven Samano MD on  5:28 PM
 
 X-ray Chest 1 View
 
 Result Date: 2019
 1.  Small loculated pleural fluid collection seen overlying the lateral left heart border i
n the lower left chest.  There may also be a small pleural effusion at the right lung base w
 
hich is layering posteriorly. 2.  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 Electronically s
igned by Eugenio Hoffman DO on 2019 4:59 PM
 
 Angioplasty 18:
 1. Aorta with narrow aortic bifurcation with moderate stenosis of proximal left common errol
c artery. 
 2. Left SFA with moderate stenosis proximally. 
 3. Single vessel runoff to left foot via left anterior tibial artery. 
 4. Left posterior tibial artery and peroneal artery were occluded with reconstitution of di
stal posterior tibial artery at the left ankle via collaterals. 
 5. If forefoot amputation does not heal, may need popliteal to posterior tibial artery bypa
ss versus antegrade access of left common femoral artery with posterior tibial artery recana
lization. 
  
 Electronically signed by Kirt Mclaughlin MD on 2018 10:51 AM 
 
    
 Assessment and Plan: 
 S/p: TMA left on 18
 Post op day/week: 2 week
 
 Radiographs taken, read and reviewed of involved foot/ankle and findings were discussed wit
h the patient
 Dressing change/applied today with dry sterile, mildly compressive dressing
 Weight bearing status: Strict nonweightbearing
 Patient needs to remain at care facility to maintain nonweightbearing status
 Weight with removable cast boot, keep on at all times
 Follow up in 2 weeks for staple removal and recheck
 Continue antibiotic treatment at the direction of the infectious disease service (Dr. Elliott)
 
 Srinivasan Jordan DPM
  in this encounter
 
 Plan of Treatment
 
 
+--------+---------+-----------+----------------------+-------------+
| Date   | Type    | Specialty | Care Team            | Description |
+--------+---------+-----------+----------------------+-------------+
| 04/10/ | Office  | Podiatry  |   Srinivasan Jordan,  |             |
|    | Visit   |           | DPM  [address], |             |
|        |         |           |  [address]  [place],  |             |
|        |         |           | [place] [address]             |             |
|        |         |           | [phone]         |             |
|        |         |           | [phone] (Fax)   |             |
+--------+---------+-----------+----------------------+-------------+
 as of this encounter
 
 Visit Diagnoses
 
 
+-----------------------------------------------------------------------------------+
| Diagnosis                                                                         |
+-----------------------------------------------------------------------------------+
|   S/P amputation of foot, left (HCC) - Primary                                    |
+-----------------------------------------------------------------------------------+
|   Type 2 diabetes mellitus with chronic kidney disease on chronic dialysis, with  |
| long-term current use of insulin (HCC)                                            |
+-----------------------------------------------------------------------------------+

## 2019-04-09 NOTE — XMS
Encounter Summary
  Created on: 2019
 
 Cherie Villalobos
 External Reference #: [id number]
 : 48
 Sex: Female
 
 Demographics
 
 
+-----------------------+--------------------------+
| Address               | [address]               |
|                       | [place]  [address] |
+-----------------------+--------------------------+
| Home Phone            | [phone]          |
+-----------------------+--------------------------+
| Preferred Language    | Unknown                  |
+-----------------------+--------------------------+
| Marital Status        |                   |
+-----------------------+--------------------------+
| Faith Affiliation | 1013                     |
+-----------------------+--------------------------+
| Race                  | Unknown                  |
+-----------------------+--------------------------+
| Ethnic Group          | Unknown                  |
+-----------------------+--------------------------+
 
 
 Author
 
 
+--------------+-----------------------+
| Author       | [facility] |
+--------------+-----------------------+
| Organization | [facility] |
+--------------+-----------------------+
| Address      | Unknown               |
+--------------+-----------------------+
| Phone        | Unavailable           |
+--------------+-----------------------+
 
 
 
 Support
 
 
+---------------+--------------+---------------------+-----------------+
| Name          | Relationship | Address             | Phone           |
+---------------+--------------+---------------------+-----------------+
| Anoop Villalobos | ECON         | [place], | [phone] |
|               |              |  [place]  [address]     |                 |
+---------------+--------------+---------------------+-----------------+
| Jennifer Dickson | ECON         | [place]       | [phone] |
|               |              | [address]               |                 |
+---------------+--------------+---------------------+-----------------+
 
 
 
 
 Care Team Providers
 
 
+-----------------------+------+-----------------+
| Care Team Member Name | Role | Phone           |
+-----------------------+------+-----------------+
| Ivy Couch DO      | PCP  | [phone] |
+-----------------------+------+-----------------+
 
 
 
 Reason for Visit
 
 
+--------+-----------------------------------------------------+
| Reason | Comments                                            |
+--------+-----------------------------------------------------+
| [facility]  | 2019-[facility] Note |
+--------+-----------------------------------------------------+
 
 
 
 Encounter Details
 
 
+--------+-------------+----------------------+----------------------+----------------------
+
| Date   | Type        | Department           | Care Team            | Description          
|
+--------+-------------+----------------------+----------------------+----------------------
+
| 02/15/ | Documentati |   [facility]      |   João Asher MD  | Other (January       
|
| 2019   | on Only     | [facility]  [address]        |  [address]   | [facility] Provider 
|
|        |             | [address]   | [address]  [place], [place]    |  Dialysis Rounding   
|
|        |             | [place] [address]   | [address]  [phone]  | Note)                
|
|        |             | [phone]         |  [phone] (Fax)  |                      
|
+--------+-------------+----------------------+----------------------+----------------------
+
 
 
 
 Social History
 
 
+---------------+------------+-----------+--------+------------------+
| Tobacco Use   | Types      | Packs/Day | Years  | Date             |
|               |            |           | Used   |                  |
+---------------+------------+-----------+--------+------------------+
| Former Smoker | Cigarettes | 1         | 30     | Quit: 2004 |
+---------------+------------+-----------+--------+------------------+
 
 
 
+---------------------+---+---+---+
 
| Smokeless Tobacco:  |   |   |   |
| Never Used          |   |   |   |
+---------------------+---+---+---+
 
 
 
+------------------------------+
| Comments: quite smoking  |
+------------------------------+
 
 
 
+-------------+-----------+---------+----------+
| Alcohol Use | Drinks/We | oz/Week | Comments |
|             | ek        |         |          |
+-------------+-----------+---------+----------+
| No          |           |         |          |
+-------------+-----------+---------+----------+
 
 
 
+------------------+---------------+
| Sex Assigned at  | Date Recorded |
| Birth            |               |
+------------------+---------------+
| Not on file      |               |
+------------------+---------------+
 as of this encounter
 
 Plan of Treatment
 
 
+--------+---------+-----------+----------------------+-------------+
| Date   | Type    | Specialty | Care Team            | Description |
+--------+---------+-----------+----------------------+-------------+
| 04/10/ | Office  | Podiatry  |   Srinivasan Jordan,  |             |
|    | Visit   |           | DPM  [address], |             |
|        |         |           |  [address]  [place],  |             |
|        |         |           | [place] [address]             |             |
|        |         |           | [phone]         |             |
|        |         |           | [phone] (Fax)   |             |
+--------+---------+-----------+----------------------+-------------+
 as of this encounter
 
 Visit Diagnoses
 Not on filein this encounter

## 2019-04-09 NOTE — XMS
Encounter Summary
  Created on: 2019
 
 Cherie Villalobos
 External Reference #: [id number]
 : 48
 Sex: Female
 
 Demographics
 
 
+-----------------------+--------------------------+
| Address               | [address]               |
|                       | [place]  [address] |
+-----------------------+--------------------------+
| Home Phone            | [phone]          |
+-----------------------+--------------------------+
| Preferred Language    | Unknown                  |
+-----------------------+--------------------------+
| Marital Status        |                   |
+-----------------------+--------------------------+
| Tenriism Affiliation | 1013                     |
+-----------------------+--------------------------+
| Race                  | Unknown                  |
+-----------------------+--------------------------+
| Ethnic Group          | Unknown                  |
+-----------------------+--------------------------+
 
 
 Author
 
 
+--------------+-----------------------+
| Author       | [facility] |
+--------------+-----------------------+
| Organization | [facility] |
+--------------+-----------------------+
| Address      | Unknown               |
+--------------+-----------------------+
| Phone        | Unavailable           |
+--------------+-----------------------+
 
 
 
 Support
 
 
+---------------+--------------+---------------------+-----------------+
| Name          | Relationship | Address             | Phone           |
+---------------+--------------+---------------------+-----------------+
| Anoop Villalobos | ECON         | [place], | [phone] |
|               |              |  [place]  [address]     |                 |
+---------------+--------------+---------------------+-----------------+
| Jennifer Dickson | ECON         | [place]       | [phone] |
|               |              | [address]               |                 |
+---------------+--------------+---------------------+-----------------+
 
 
 
 
 Care Team Providers
 
 
+-----------------------+------+-----------------+
| Care Team Member Name | Role | Phone           |
+-----------------------+------+-----------------+
| Ivy Couch DO      | PCP  | [phone] |
+-----------------------+------+-----------------+
 
 
 
 Encounter Details
 
 
+--------+-------------+----------------------+---------------------+-------------+
| Date   | Type        | Department           | Care Team           | Description |
+--------+-------------+----------------------+---------------------+-------------+
| / | Orders Only |   Dominic Burnham    |   Jessica Marley,  |             |
| 2019   |             | [facility]  | [facility]MS                |             |
|        |             | Ultrasound  [address]      |                     |             |
|        |             | [address]           |                     |             |
|        |             | [place] [address]   |                     |             |
|        |             | [phone]         |                     |             |
+--------+-------------+----------------------+---------------------+-------------+
 
 
 
 Social History
 
 
+---------------+------------+-----------+--------+------------------+
| Tobacco Use   | Types      | Packs/Day | Years  | Date             |
|               |            |           | Used   |                  |
+---------------+------------+-----------+--------+------------------+
| Former Smoker | Cigarettes | 1         | 30     | Quit: 2004 |
+---------------+------------+-----------+--------+------------------+
 
 
 
+---------------------+---+---+---+
| Smokeless Tobacco:  |   |   |   |
| Never Used          |   |   |   |
+---------------------+---+---+---+
 
 
 
+------------------------------+
| Comments: quite smoking 2004 |
+------------------------------+
 
 
 
+-------------+-----------+---------+----------+
| Alcohol Use | Drinks/We | oz/Week | Comments |
|             | ek        |         |          |
+-------------+-----------+---------+----------+
| No          |           |         |          |
+-------------+-----------+---------+----------+
 
 
 
 
+------------------+---------------+
| Sex Assigned at  | Date Recorded |
| Birth            |               |
+------------------+---------------+
| Not on file      |               |
+------------------+---------------+
 as of this encounter
 
 Plan of Treatment
 
 
+--------+---------+-----------+----------------------+-------------+
| Date   | Type    | Specialty | Care Team            | Description |
+--------+---------+-----------+----------------------+-------------+
| 04/10/ | Office  | Podiatry  |   Srinivasan Jordan,  |             |
| 2019   | Visit   |           | DPM  [address], |             |
|        |         |           |  [address]  [place],  |             |
|        |         |           | [place] [address]             |             |
|        |         |           | [phone]         |             |
|        |         |           | [phone] (Fax)   |             |
+--------+---------+-----------+----------------------+-------------+
 as of this encounter
 
 Visit Diagnoses
 Not on filein this encounter

## 2019-04-09 NOTE — XMS
Encounter Summary
  Created on: 2019
 
 Cherie Villaloobs
 External Reference #: [id number]
 : 48
 Sex: Female
 
 Demographics
 
 
+-----------------------+--------------------------+
| Address               | [address]               |
|                       | [place]  [address] |
+-----------------------+--------------------------+
| Home Phone            | [phone]          |
+-----------------------+--------------------------+
| Preferred Language    | Unknown                  |
+-----------------------+--------------------------+
| Marital Status        |                   |
+-----------------------+--------------------------+
| Caodaism Affiliation | [id number]                     |
+-----------------------+--------------------------+
| Race                  | Unknown                  |
+-----------------------+--------------------------+
| Ethnic Group          | Unknown                  |
+-----------------------+--------------------------+
 
 
 Author
 
 
+--------------+-----------------------+
| Author       | [employer] |
+--------------+-----------------------+
| Organization | [facility] [employer] |
+--------------+-----------------------+
| Address      | Unknown               |
+--------------+-----------------------+
| Phone        | Unavailable           |
+--------------+-----------------------+
 
 
 
 Support
 
 
+---------------+--------------+---------------------+-----------------+
| Name          | Relationship | Address             | Phone           |
+---------------+--------------+---------------------+-----------------+
| Anoop Villalobos | ECON         | [place], | [phone] |
|               |              |  [place]  [address]     |                 |
+---------------+--------------+---------------------+-----------------+
| Jennifer Dickson | ECON         | [place]       | [phone] |
|               |              | [id number]               |                 |
+---------------+--------------+---------------------+-----------------+
 
 
 
 
 Care Team Providers
 
 
+-----------------------+------+-----------------+
| Care Team Member Name | Role | Phone           |
+-----------------------+------+-----------------+
| Ivy Couch DO      | PCP  | [phone] |
+-----------------------+------+-----------------+
 
 
 
 Encounter Details
 
 
+--------+------------+----------------------+-----------+-------------+
| Date   | Type       | Department           | Care Team | Description |
+--------+------------+----------------------+-----------+-------------+
| / | Procedure  |   [facility]    |           |             |
| 2019   | Pass       | [facility] [address]   |           |             |
|        |            | Floor [address] |           |             |
|        |            |   [address]     |           |             |
|        |            | [place] [address]   |           |             |
|        |            | [phone]         |           |             |
+--------+------------+----------------------+-----------+-------------+
 
 
 
 Social History
 
 
+---------------+------------+-----------+--------+------------------+
| Tobacco Use   | Types      | Packs/Day | Years  | Date             |
|               |            |           | Used   |                  |
+---------------+------------+-----------+--------+------------------+
| Former Smoker | Cigarettes | 1         | 30     | Quit: 2004 |
+---------------+------------+-----------+--------+------------------+
 
 
 
+---------------------+---+---+---+
| Smokeless Tobacco:  |   |   |   |
| Never Used          |   |   |   |
+---------------------+---+---+---+
 
 
 
+------------------------------+
| Comments: quite smoking  |
+------------------------------+
 
 
 
+-------------+-----------+---------+----------+
| Alcohol Use | Drinks/We | oz/Week | Comments |
|             | ek        |         |          |
+-------------+-----------+---------+----------+
| No          |           |         |          |
+-------------+-----------+---------+----------+
 
 
 
 
+------------------+---------------+
| Sex Assigned at  | Date Recorded |
| Birth            |               |
+------------------+---------------+
| Not on file      |               |
+------------------+---------------+
 as of this encounter
 
 Plan of Treatment
 
 
+--------+---------+-----------+----------------------+-------------+
| Date   | Type    | Specialty | Care Team            | Description |
+--------+---------+-----------+----------------------+-------------+
| 04/10/ | Office  | Podiatry  |   Srinivasan Jordan,  |             |
| 2019   | Visit   |           | DPM  [address], |             |
|        |         |           |  [place],  |             |
|        |         |           | [place] [address]             |             |
|        |         |           | [phone]         |             |
|        |         |           | [phone] (Fax)   |             |
+--------+---------+-----------+----------------------+-------------+
 as of this encounter
 
 Visit Diagnoses
 Not on filein this encounter

## 2019-04-19 ENCOUNTER — HOSPITAL ENCOUNTER (EMERGENCY)
Dept: HOSPITAL 46 - ED | Age: 69
Discharge: ANOTHER HOSPITAL | End: 2019-04-19
Payer: MEDICARE

## 2019-04-19 VITALS — HEIGHT: 70 IN | BODY MASS INDEX: 20.33 KG/M2 | WEIGHT: 142 LBS

## 2019-04-19 DIAGNOSIS — J44.9: ICD-10-CM

## 2019-04-19 DIAGNOSIS — Z79.82: ICD-10-CM

## 2019-04-19 DIAGNOSIS — E87.5: Primary | ICD-10-CM

## 2019-04-19 DIAGNOSIS — I50.9: ICD-10-CM

## 2019-04-19 DIAGNOSIS — E11.22: ICD-10-CM

## 2019-04-19 DIAGNOSIS — Z88.5: ICD-10-CM

## 2019-04-19 DIAGNOSIS — Z88.0: ICD-10-CM

## 2019-04-19 DIAGNOSIS — Z88.8: ICD-10-CM

## 2019-04-19 DIAGNOSIS — Z95.810: ICD-10-CM

## 2019-04-19 DIAGNOSIS — Z79.899: ICD-10-CM

## 2019-04-19 DIAGNOSIS — Z79.4: ICD-10-CM

## 2019-04-19 DIAGNOSIS — Z99.2: ICD-10-CM

## 2019-04-19 DIAGNOSIS — N18.9: ICD-10-CM

## 2019-04-19 DIAGNOSIS — Z90.710: ICD-10-CM

## 2019-04-19 NOTE — XMS
Encounter Summary
  Created on: 2019
 
 Cherie Villalobos
 External Reference #: [id number]
 : 48
 Sex: Female
 
 Demographics
 
 
+-----------------------+--------------------------+
| Address               | [address]               |
|                       | [place]  [address] |
+-----------------------+--------------------------+
| Home Phone            | [phone]          |
+-----------------------+--------------------------+
| Preferred Language    | Unknown                  |
+-----------------------+--------------------------+
| Marital Status        |                   |
+-----------------------+--------------------------+
| Jainism Affiliation | [id number]                     |
+-----------------------+--------------------------+
| Race                  | Unknown                  |
+-----------------------+--------------------------+
| Ethnic Group          | Unknown                  |
+-----------------------+--------------------------+
 
 
 Author
 
 
+--------------+-----------------------+
| Author       | [facility] |
+--------------+-----------------------+
| Organization | [facility] |
+--------------+-----------------------+
| Address      | Unknown               |
+--------------+-----------------------+
| Phone        | Unavailable           |
+--------------+-----------------------+
 
 
 
 Support
 
 
+---------------+--------------+---------------------+-----------------+
| Name          | Relationship | Address             | Phone           |
+---------------+--------------+---------------------+-----------------+
| Anoop Villalobos | ECON         | [place], | [phone] |
|               |              |  [place]  [address]     |                 |
+---------------+--------------+---------------------+-----------------+
| Jennifer Dickson | ECON         | [place]       | [phone] |
|               |              | [id number]               |                 |
+---------------+--------------+---------------------+-----------------+
 
 
 
 
 Care Team Providers
 
 
+-----------------------+------+-----------------+
| Care Team Member Name | Role | Phone           |
+-----------------------+------+-----------------+
| Ivy Couch DO      | PCP  | [phone] |
+-----------------------+------+-----------------+
 
 
 
 Reason for Visit
 
 
+--------------+-------------------------------------------------------------------+
| Reason       | Comments                                                          |
+--------------+-------------------------------------------------------------------+
| Post-op Exam | Post op, amputation, left foot. Patient pain level is 3/10 and    |
|              | relates she Fx her right hallux. Patient is unsure when her       |
|              | injury took place and went to [facility] ED and had xrays. Patient      |
|              | states she has been walking on the toe since going to the ED.     |
|              | Patient states her left foot continues to phantom pain. Patient   |
|              | states that other than phantom pain her left foot is doing well.  |
+--------------+-------------------------------------------------------------------+
 
 
 
 Encounter Details
 
 
+--------+---------+----------------------+----------------------+----------------------+
| Date   | Type    | Department           | Care Team            | Description          |
+--------+---------+----------------------+----------------------+----------------------+
| / | Office  |   [facility] |   Srinivasan Jordan,  | Closed nondisplaced  |
| 2019   | Visit   |  and Ankle  [address]      | DPM  [address], | fracture of distal   |
|        |         | [address]   |  [address]  [place],  | phalanx of right     |
|        |         | [facility] [place]         | [place] [address]             | great toe with       |
|        |         | [address]           | [phone]         | routine healing,     |
|        |         | [phone]         | [phone] (Fax)   | subsequent encounter |
|        |         |                      |                      |  (Primary Dx);       |
|        |         |                      |                      | Post-operative       |
|        |         |                      |                      | state; Toe pain,     |
|        |         |                      |                      | right                |
+--------+---------+----------------------+----------------------+----------------------+
 
 
 
 Social History
 
 
+---------------+------------+-----------+--------+------------------+
| Tobacco Use   | Types      | Packs/Day | Years  | Date             |
|               |            |           | Used   |                  |
+---------------+------------+-----------+--------+------------------+
| Former Smoker | Cigarettes | 1         | 30     | Quit: 2004 |
+---------------+------------+-----------+--------+------------------+
 
 
 
 
+---------------------+---+---+---+
| Smokeless Tobacco:  |   |   |   |
| Never Used          |   |   |   |
+---------------------+---+---+---+
 
 
 
+------------------------------+
| Comments: quite smoking  |
+------------------------------+
 
 
 
+-------------+-----------+---------+----------+
| Alcohol Use | Drinks/We | oz/Week | Comments |
|             | ek        |         |          |
+-------------+-----------+---------+----------+
| No          |           |         |          |
+-------------+-----------+---------+----------+
 
 
 
+------------------+---------------+
| Sex Assigned at  | Date Recorded |
| Birth            |               |
+------------------+---------------+
| Not on file      |               |
+------------------+---------------+
 as of this encounter
 
 Last Filed Vital Signs
 
 
+-------------------+---------------------+-------------------------+
| Vital Sign        | Reading             | Time Taken              |
+-------------------+---------------------+-------------------------+
| Blood Pressure    | 124/60              | 2019  2:40 PM PST |
+-------------------+---------------------+-------------------------+
| Pulse             | 92                  | 2019  2:40 PM PST |
+-------------------+---------------------+-------------------------+
| Temperature       | 36.8   C (98.2   F) | 2019  2:40 PM PST |
+-------------------+---------------------+-------------------------+
| Respiratory Rate  | -                   | -                       |
+-------------------+---------------------+-------------------------+
| Oxygen Saturation | 97%                 | 2019  2:40 PM PST |
+-------------------+---------------------+-------------------------+
| Inhaled Oxygen    | -                   | -                       |
| Concentration     |                     |                         |
+-------------------+---------------------+-------------------------+
| Weight            | -                   | -                       |
+-------------------+---------------------+-------------------------+
| Height            | -                   | -                       |
+-------------------+---------------------+-------------------------+
| Body Mass Index   | -                   | -                       |
+-------------------+---------------------+-------------------------+
 in this encounter
 
 Progress Notes
 Srinivasan Jordan DPM - 2019  2:45 PM PSTFormatting of this note may be different fro
m the original.
 
  
 Subjective: 
  Patient ID: Cherie Villalobos is a 68 y.o. female.
 Chief Complaint 
 Patient presents with 
   Post-op Exam 
   Post op, amputation, left foot. Patient pain level is 3/10 and relates she Fx her right h
allux. Patient is unsure when her injury took place and went to [facility] ED and had xrays. Cindi
nt states she has been walking on the toe since going to the ED.  Patient states her left fo
ot continues to phantom pain. Patient states that other than phantom pain her left foot is d
oing well.  
  
 
 HPI
 Patient returns today status post TMA left on 18.  She reports that she suffered a fa
ll and may have broken her right great toe.  Initially in the hospital she reports that she 
would told she would need amputation of the toe.  It is looking better over time but slowly.
 Her left foot is healing well after Trans-metatarsal amputation, no concerns with that. 
 
 The following portions of the patient's history were reviewed and updated as appropriate an
d is available elsewhere in the record: allergies, current medications, past family history,
 past medical history, past social history, past surgical history and problem list.
 
 ROS
 Denies any nausea, vomiting, fever, chills, shortness of breath, chest pain, or calf pain 
 
     
 Objective: 
 /60  | Pulse 92  | Temp 98.2 F (36.8 C)  | SpO2 97% 
 General observation:
 Constitutional: The patient is alert and oriented to person, place and time
 Psychiatric: The patient has normal affect and mood; her speech is normal; her behavior is 
normal.
 Respiratory: Effort normal and breath sounds normal. No accessory muscle usage. No respirat
ory distress.   
 
 Lower Extremity Examination:
 Trans-metatarsal amputation left, site healing well, two very small superficial wounds
 Wound, irregular and linear dorsal right great toe, no erythema, mild edema of toe
 No skin mottling. No hyperesthesias or paresthesias.
 No calf or thigh pain. Negative Homans sign. 
 pain on palpation of the interphalangeal joint of the hallux right dorsal
 
 Xr Toe Right 2 View
 
 Result Date: 2019
 No radiographic evidence of osteomyelitis seen.  If further assessment is warranted, consid
er correlation with MRI. Potential acute fracture through the base of the distal phalanx. Si
gned by: Gavin South Sign Date/Time: 2019 5:15 PM
 
 Us Lower Extremty  Arterial Right
 
 Result Date: 2019
 1. Right leg shows biphasic inflow with a greater than 50% stenosis at the origin of the dobbins
perficial femoral artery ([id number]).  Biphasic outflow. 2. Two vessel runoff to the right jeanne
t. Signed by: Eugenio Hoffman Sign Date/Time: 2019 3:11 PM
 
 Radiographs: .  See epic chart for complete read
 
 X-ray Foot Left
 
 
 Result Date: 2018
 Amputation of the left forefoot at the level of the proximal metatarsals. Surgical cutaneou
s staples are present. No radiographic evidence for osteomyelitis. Normal alignment of the h
indfoot. Mild degeneration of the midfoot. Electronically signed by Oleksandr Lemus MD on 2018 10:12 AM
 
 Ct Head Without Contrast
 
 Result Date: 2018
 1.  No acute intracranial pathology. Electronically signed by Steven Samano MD on  5:28 PM
 
 X-ray Chest 1 View
 
 Result Date: 2019
 1.  Small loculated pleural fluid collection seen overlying the lateral left heart border i
n the lower left chest.  There may also be a small pleural effusion at the right lung base w
hich is layering posteriorly. 2.  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 Electronically s
igned by Eugenio Hoffman DO on 2019 4:59 PM
 
 Angioplasty 18:
 1. Aorta with narrow aortic bifurcation with moderate stenosis of proximal left common errol
c artery. 
 2. Left SFA with moderate stenosis proximally. 
 3. Single vessel runoff to left foot via left anterior tibial artery. 
 4. Left posterior tibial artery and peroneal artery were occluded with reconstitution of di
stal posterior tibial artery at the left ankle via collaterals. 
 5. If forefoot amputation does not heal, may need popliteal to posterior tibial artery bypa
ss versus antegrade access of left common femoral artery with posterior tibial artery recana
lization. 
  
 Electronically signed by Kirt Mclaughlin MD on 2018 10:51 AM 
 
    
 Assessment and Plan: 
 S/p: TMA left on 18
 DM, ESRD, PAD
 Healing open fracture of right great toe?
 
 Radiographs taken, read and reviewed of involved foot/ankle and findings were discussed wit
h the patient
 Irregularity at the base of the distal phalanx concerning for avulsion fracture
 Wound appears stable dorsal right great toe
 Dressing change/applied today with dry sterile
 Rx diabetic shoes with toe filler left
 Continue use of removable cast boot until shoes received
 Fit with surgical shoe right, wear for all activities until DM shoes obtained
 Dress wound right great toe with bacitracin ointment daily with dry sterile dressing
 F/u 2 weeks
 
 Srinivasan Jordan DPM
  in this encounter
 
 Plan of Treatment
 
 
+--------+----------+-----------------+----------------------+-------------+
| Date   | Type     | Specialty       | Care Team            | Description |
+--------+----------+-----------------+----------------------+-------------+
 
| / | Initial  | General Surgery |   Сергей Medeiros, |             |
| 2019   | consult  |                 |  MD [address]  |             |
|        |          |                 |  [place] [address]  |             |
|        |          |                 |  [phone]        |             |
|        |          |                 | [phone] (Fax)   |             |
+--------+----------+-----------------+----------------------+-------------+
 as of this encounter
 
 Results
 X-ray foot right 3+ views (2019  2:58 PM)
 
+------------------------------------------------------------------------+--------------+
| Impressions                                                            | Performed At |
+------------------------------------------------------------------------+--------------+
|   Linear lucency/irregularity at the distal 1st phalanx, may represent |   [facility]     |
|   minimally displaced fracture.  Signed by: Oleksandr Lemus  Sign         | RADIOLOGY    |
| Date/Time: 2019 10:20 AM                                         |              |
+------------------------------------------------------------------------+--------------+
 
 
 
+------------------------------------------------------------------------+--------------+
| Narrative                                                              | Performed At |
+------------------------------------------------------------------------+--------------+
|   RIGHT FOOT THREE VIEWS  CLINICAL INFORMATION:  Possible fracture of  |   [facility]     |
| right hallux.  COMPARISON:  XR TOES RIGHT (2019); XR FOOT LEFT    | RADIOLOGY    |
| (2018);  FINDINGS:  No acute fracture or dislocation.  Small     |              |
| calcaneal plantar enthesophyte.  Linear lucency/irregularity at the    |              |
| distal 1st phalanx, may represent  minimally displaced fracture.  Mild |              |
|  midfoot degeneration.                                                 |              |
+------------------------------------------------------------------------+--------------+
 
 
 
+-------------------------------------------------------------------------+
| Procedure Note                                                          |
+-------------------------------------------------------------------------+
|   Lauro Baldwin Results In - 2019 10:23 AM PST  RIGHT FOOT THREE VIEWS |
| CLINICAL INFORMATION:                                                   |
| Possible fracture of right hallux.                                      |
| COMPARISON:                                                             |
| XR TOES RIGHT (2019); XR FOOT LEFT (2018);                   |
| FINDINGS:                                                               |
| No acute fracture or dislocation.                                       |
| Small calcaneal plantar enthesophyte.                                   |
| Linear lucency/irregularity at the distal 1st phalanx, may represent    |
| minimally displaced fracture.                                           |
| Mild midfoot degeneration.                                              |
| IMPRESSION:                                                             |
| Linear lucency/irregularity at the distal 1st phalanx, may represent    |
| minimally displaced fracture.                                           |
| Signed by: Oleksandr Lemus                                                 |
| Sign Date/Time: 2019 10:20 AM                                     |
+-------------------------------------------------------------------------+
 
 
 
+--------------------+------------------+--------------------+--------------+
| Performing         | Address          | City/State/Zipcode | Phone Number |
| Organization       |                  |                    |              |
 
+--------------------+------------------+--------------------+--------------+
|   [facility] |   [address] | [place] [address] |              |
+--------------------+------------------+--------------------+--------------+
 X-ray foot left 3 + views (2019  2:58 PM)
 
+----------------------------------------------------------------------+--------------+
| Impressions                                                          | Performed At |
+----------------------------------------------------------------------+--------------+
|   No acute fracture.    No radiographic evidence for osteomyelitis   |   [facility]     |
| Signed by: Oleksandr Lemus Date/Time: 2019 10:21 AM         | RADIOLOGY    |
+----------------------------------------------------------------------+--------------+
 
 
 
+------------------------------------------------------------------------+--------------+
| Narrative                                                              | Performed At |
+------------------------------------------------------------------------+--------------+
|   LEFT FOOT THREE VIEWS  CLINICAL INFORMATION:  8 weeks post op,       |   [facility]     |
| forefoot amputation.  COMPARISON:  XR TOES RIGHT (2019); XR FOOT  | RADIOLOGY    |
| LEFT (2018); XR FOOT LEFT  (2018);  FINDINGS:  Amputation  |              |
| of the forefoot at the level of the proximal metacarpals.  No          |              |
| radiographic evidence for osteomyelitis.  No acute fractures.          |              |
+------------------------------------------------------------------------+--------------+
 
 
 
+------------------------------------------------------------------------+
| Procedure Note                                                         |
+------------------------------------------------------------------------+
|   Lauro Baldwin Results In - 2019 10:25 AM PST  LEFT FOOT THREE VIEWS |
| CLINICAL INFORMATION:                                                  |
| 8 weeks post op, forefoot amputation.                                  |
| COMPARISON:                                                            |
| XR TOES RIGHT (2019); XR FOOT LEFT (2018); XR FOOT LEFT     |
| (2018);                                                          |
| FINDINGS:                                                              |
| Amputation of the forefoot at the level of the proximal metacarpals.   |
| No radiographic evidence for osteomyelitis.                            |
| No acute fractures.                                                    |
| IMPRESSION:                                                            |
| No acute fracture.  No radiographic evidence for osteomyelitis         |
| Signed by: Oleksandr Lemus                                                |
| Sign Date/Time: 2019 10:21 AM                                    |
+------------------------------------------------------------------------+
 
 
 
+--------------------+------------------+--------------------+--------------+
| Performing         | Address          | City/State/Zipcode | Phone Number |
| Organization       |                  |                    |              |
+--------------------+------------------+--------------------+--------------+
|   [facility] |   [address] | [place] [address] |              |
+--------------------+------------------+--------------------+--------------+
 in this encounter
 
 Visit Diagnoses
 
 
+-----------------------------------------------------------------------------------+
| Diagnosis                                                                         |
 
+-----------------------------------------------------------------------------------+
|   Closed nondisplaced fracture of distal phalanx of right great toe with routine  |
| healing, subsequent encounter - Primary                                           |
+-----------------------------------------------------------------------------------+
|   Post-operative state                                                            |
+-----------------------------------------------------------------------------------+
|   Other postprocedural status                                                     |
+-----------------------------------------------------------------------------------+
|   Toe pain, right                                                                 |
+-----------------------------------------------------------------------------------+
|   Pain in limb                                                                    |
+-----------------------------------------------------------------------------------+

## 2019-04-19 NOTE — XMS
Encounter Summary
  Created on: 2019
 
 Cherie Villalobos
 External Reference #: [id number]
 : 48
 Sex: Female
 
 Demographics
 
 
+-----------------------+--------------------------+
| Address               | [address]               |
|                       | [place]  [address] |
+-----------------------+--------------------------+
| Home Phone            | [phone]          |
+-----------------------+--------------------------+
| Preferred Language    | Unknown                  |
+-----------------------+--------------------------+
| Marital Status        |                   |
+-----------------------+--------------------------+
| Roman Catholic Affiliation | 1013                     |
+-----------------------+--------------------------+
| Race                  | Unknown                  |
+-----------------------+--------------------------+
| Ethnic Group          | Unknown                  |
+-----------------------+--------------------------+
 
 
 Author
 
 
+--------------+-----------------------+
| Author       | [employer] |
+--------------+-----------------------+
| Organization | [facility] |
+--------------+-----------------------+
| Address      | Unknown               |
+--------------+-----------------------+
| Phone        | Unavailable           |
+--------------+-----------------------+
 
 
 
 Support
 
 
+---------------+--------------+---------------------+-----------------+
| Name          | Relationship | Address             | Phone           |
+---------------+--------------+---------------------+-----------------+
| Anoop Villalobos | ECON         | [place], | [phone] |
|               |              |  [place]  [address]     |                 |
+---------------+--------------+---------------------+-----------------+
| Jennifer Dickson | ECON         | [place]       | [phone] |
|               |              | [id number]               |                 |
+---------------+--------------+---------------------+-----------------+
 
 
 
 
 Care Team Providers
 
 
+-----------------------+------+-----------------+
| Care Team Member Name | Role | Phone           |
+-----------------------+------+-----------------+
| Ivy Couch DO      | PCP  | [phone] |
+-----------------------+------+-----------------+
 
 
 
 Reason for Visit
 
 
+-----------+-------------------+
| Reason    | Comments          |
+-----------+-------------------+
| Follow-up | reschedule /10  |
+-----------+-------------------+
 
 
 
 Encounter Details
 
 
+--------+-----------+----------------------+----------------------+---------------------+
| Date   | Type      | Department           | Care Team            | Description         |
+--------+-----------+----------------------+----------------------+---------------------+
| 04/10/ | Telephone |   [facility] |   Srinivasan Jordan,  | Follow-up           |
| 2019   |           |  and Ankle  [address]      | DPM  [address], | (reschedule 04/10 ) |
|        |           | [address]   |  [address]  [place],  |                     |
|        |           | [place], [place]         | [place] [address]             |                     |
|        |           | [address]           | [phone]         |                     |
|        |           | [phone]         | [phone] (Fax)   |                     |
+--------+-----------+----------------------+----------------------+---------------------+
 
 
 
 Social History
 
 
+---------------+------------+-----------+--------+------------------+
| Tobacco Use   | Types      | Packs/Day | Years  | Date             |
|               |            |           | Used   |                  |
+---------------+------------+-----------+--------+------------------+
| Former Smoker | Cigarettes | 1         | 30     | Quit: 2004 |
+---------------+------------+-----------+--------+------------------+
 
 
 
+---------------------+---+---+---+
| Smokeless Tobacco:  |   |   |   |
| Never Used          |   |   |   |
+---------------------+---+---+---+
 
 
 
+------------------------------+
| Comments: quite smoking  |
 
+------------------------------+
 
 
 
+-------------+-----------+---------+----------+
| Alcohol Use | Drinks/We | oz/Week | Comments |
|             | ek        |         |          |
+-------------+-----------+---------+----------+
| No          |           |         |          |
+-------------+-----------+---------+----------+
 
 
 
+------------------+---------------+
| Sex Assigned at  | Date Recorded |
| Birth            |               |
+------------------+---------------+
| Not on file      |               |
+------------------+---------------+
 as of this encounter
 
 Plan of Treatment
 
 
+--------+----------+-----------------+----------------------+-------------+
| Date   | Type     | Specialty       | Care Team            | Description |
+--------+----------+-----------------+----------------------+-------------+
| / | Initial  | General Surgery |   Сергей Medeiros, |             |
|    | consult  |                 |  MD [address]  |             |
|        |          |                 |  [place] [address]  |             |
|        |          |                 |  [phone]        |             |
|        |          |                 | [phone] (Fax)   |             |
+--------+----------+-----------------+----------------------+-------------+
 as of this encounter
 
 Visit Diagnoses
 Not on filein this encounter

## 2019-04-19 NOTE — XMS
Encounter Summary
  Created on: 2019
 
 Cherie Villalobos
 External Reference #: [id number]
 : 48
 Sex: Female
 
 Demographics
 
 
+-----------------------+--------------------------+
| Address               | [address]               |
|                       | [place]  [address] |
+-----------------------+--------------------------+
| Home Phone            | [phone]          |
+-----------------------+--------------------------+
| Preferred Language    | Unknown                  |
+-----------------------+--------------------------+
| Marital Status        |                   |
+-----------------------+--------------------------+
| Anabaptist Affiliation | 1013                     |
+-----------------------+--------------------------+
| Race                  | Unknown                  |
+-----------------------+--------------------------+
| Ethnic Group          | Unknown                  |
+-----------------------+--------------------------+
 
 
 Author
 
 
+--------------+--------------------------------------------+
| Author       | [facility]  |
|              | [facility] [place]                                |
+--------------+--------------------------------------------+
| Organization | [facility]  |
|              | [facility] [place]                                |
+--------------+--------------------------------------------+
| Address      | Unknown                                    |
+--------------+--------------------------------------------+
| Phone        | Unavailable                                |
+--------------+--------------------------------------------+
 
 
 
 Support
 
 
+---------------+--------------+---------------------+-----------------+
| Name          | Relationship | Address             | Phone           |
+---------------+--------------+---------------------+-----------------+
| Anoop Villalobos | ECON         | [address], | [phone] |
|               |              |  [place]  [address]     |                 |
+---------------+--------------+---------------------+-----------------+
| Jennifer Dickson | ECON         | [place], [place]       | [phone] |
|               |              | [phone]               |                 |
+---------------+--------------+---------------------+-----------------+
 
 
 
 
 Care Team Providers
 
 
+--------------------------+------+-----------------+
| Care Team Member Name    | Role | Phone           |
+--------------------------+------+-----------------+
| Araseli Montelongo Activity  | PCP  | [phone] |
| Professional             |      |                 |
+--------------------------+------+-----------------+
 
 
 
 Reason for Visit
 
 
+--------+------------------------------------+
| Reason | Comments                           |
+--------+------------------------------------+
| Other  | medication changed due to ER visit |
+--------+------------------------------------+
 
 
 
 Encounter Details
 
 
+--------+-----------+----------------------+----------------------+--------------------+
| Date   | Type      | Department           | Care Team            | Description        |
+--------+-----------+----------------------+----------------------+--------------------+
| / | Telephone |   [facility]          |   Johnie John, | Other (medication  |
| 2019   |           | CARDIOLOGY  [address]    |  MD  [address] | changed due to ER  |
|        |           | [address]  [place], |  [address].  [place],   | visit)             |
|        |           |  [place] [address]       | [place] [address]             |                    |
|        |           | [phone]         | [phone]         |                    |
|        |           |                      | [phone] (Fax)   |                    |
+--------+-----------+----------------------+----------------------+--------------------+
 
 
 
 Social History
 
 
+---------------+------------+-----------+--------+------------------+
| Tobacco Use   | Types      | Packs/Day | Years  | Date             |
|               |            |           | Used   |                  |
+---------------+------------+-----------+--------+------------------+
| Former Smoker | Cigarettes | 1         | 30     | Quit: 2004 |
+---------------+------------+-----------+--------+------------------+
 
 
 
+---------------------+---+---+---+
| Smokeless Tobacco:  |   |   |   |
| Never Used          |   |   |   |
+---------------------+---+---+---+
 
 
 
 
+-------------+-----------+---------+----------+
| Alcohol Use | Drinks/We | oz/Week | Comments |
|             | ek        |         |          |
+-------------+-----------+---------+----------+
| No          |           |         |          |
+-------------+-----------+---------+----------+
 
 
 
+------------------+---------------+
| Sex Assigned at  | Date Recorded |
| Birth            |               |
+------------------+---------------+
| Not on file      |               |
+------------------+---------------+
 
 
 
+----------------+-------------+-------------+
| Job Start Date | Occupation  | Industry    |
+----------------+-------------+-------------+
| Not on file    | Not on file | Not on file |
+----------------+-------------+-------------+
 
 
 
+----------------+--------------+------------+
| Travel History | Travel Start | Travel End |
+----------------+--------------+------------+
 
 
 
+-------------------------------------+
| No recent travel history available. |
+-------------------------------------+
 documented as of this encounter
 
 Functional Status
 
 
+---------------------------------------------+----------+--------------------+
| Functional Status                           | Response | Date of Assessment |
+---------------------------------------------+----------+--------------------+
| Are you deaf or do you have serious         | No       | 2018         |
| difficulty hearing?                         |          |                    |
+---------------------------------------------+----------+--------------------+
| Are you blind or do you have serious        | No       | 2018         |
| difficulty seeing, even when wearing        |          |                    |
| glasses?                                    |          |                    |
+---------------------------------------------+----------+--------------------+
| Do you have serious difficulty walking or   | No       | 2018         |
| climbing stairs? (5 years old or older)     |          |                    |
+---------------------------------------------+----------+--------------------+
| Do you have difficulty dressing or bathing? | No       | 2018         |
|  (5 years old or older)                     |          |                    |
+---------------------------------------------+----------+--------------------+
| Because of a physical, mental, or emotional | No       | 2018         |
|  condition, do you have difficulty doing    |          |                    |
| errands alone such as visiting a doctor's   |          |                    |
 
| office or shopping? [15 years old or        |          |                    |
| older)]                                     |          |                    |
+---------------------------------------------+----------+--------------------+
 
 
 
+---------------------------------------------+----------+--------------------+
| Cognitive Status                            | Response | Date of Assessment |
+---------------------------------------------+----------+--------------------+
| Because of a physical, mental, or emotional | No       | 2018         |
|  condition, do you have serious difficulty  |          |                    |
| concentrating, remembering, or making       |          |                    |
| decisions? (5 years old or older)           |          |                    |
+---------------------------------------------+----------+--------------------+
 documented as of this encounter
 
 Plan of Treatment
 
 
+--------+---------+------------+----------------------+-------------+
| Date   | Type    | Specialty  | Care Team            | Description |
+--------+---------+------------+----------------------+-------------+
| / | Office  | Cardiology |   Johnie John, |             |
|    | Visit   |            |  MD  [address] |             |
|        |         |            |  [place],   |             |
|        |         |            | [place] [address]             |             |
|        |         |            | [phone]         |             |
|        |         |            | [phone] (Fax)   |             |
+--------+---------+------------+----------------------+-------------+
 documented as of this encounter
 
 Visit Diagnoses
 Not on filedocumented in this encounter

## 2019-04-19 NOTE — XMS
Encounter Summary
  Created on: 2019
 
 Cherie Villalobos
 External Reference #: [id number]
 : 48
 Sex: Female
 
 Demographics
 
 
+-----------------------+--------------------------+
| Address               | [address]               |
|                       | [place]  [address] |
+-----------------------+--------------------------+
| Home Phone            | [phone]          |
+-----------------------+--------------------------+
| Preferred Language    | Unknown                  |
+-----------------------+--------------------------+
| Marital Status        |                   |
+-----------------------+--------------------------+
| Religion Affiliation | [id number]                     |
+-----------------------+--------------------------+
| Race                  | Unknown                  |
+-----------------------+--------------------------+
| Ethnic Group          | Unknown                  |
+-----------------------+--------------------------+
 
 
 Author
 
 
+--------------+-----------------------+
| Author       | Alba Sonocine |
+--------------+-----------------------+
| Organization | [facility] |
+--------------+-----------------------+
| Address      | Unknown               |
+--------------+-----------------------+
| Phone        | Unavailable           |
+--------------+-----------------------+
 
 
 
 Support
 
 
+---------------+--------------+---------------------+-----------------+
| Name          | Relationship | Address             | Phone           |
+---------------+--------------+---------------------+-----------------+
| Anoop Villalobos | ECON         | [place], | [phone] |
|               |              |  [place]  [address]     |                 |
+---------------+--------------+---------------------+-----------------+
| Jennifer Dickson | ECON         | [place]       | [phone] |
|               |              | [id number]               |                 |
+---------------+--------------+---------------------+-----------------+
 
 
 
 
 Care Team Providers
 
 
+-----------------------+------+-----------------+
| Care Team Member Name | Role | Phone           |
+-----------------------+------+-----------------+
| Ivy Couch DO      | PCP  | [phone] |
+-----------------------+------+-----------------+
 
 
 
 Reason for Visit
 
 
+-----------+------------------------------------------------------------------+
| Reason    | Comments                                                         |
+-----------+------------------------------------------------------------------+
| Foot Pain | possible infection in rt foot, was told to come in for a direct  |
|           | admit through the ER                                             |
+-----------+------------------------------------------------------------------+
 Auth/Cert
 
+--------+--------+-----------+--------------+--------------+---------------+
| Status | Reason | Specialty | Diagnoses /  | Referred By  | Referred To   |
|        |        |           | Procedures   | Contact      | Contact       |
+--------+--------+-----------+--------------+--------------+---------------+
|        |        | Internal  |   Diagnoses  |              |   [facility]    |
|        |        | Medicine  |  PVD         |              | [facility]   |
|        |        |           | (peripheral  |              | [address]  [address] |
|        |        |           | vascular     |              |  [address]   |
|        |        |           | disease)     |              | [place], [place]  |
|        |        |           | (HCC)  ESRD  |              | [address]  Phone: |
|        |        |           | (end stage   |              |  [phone] |
|        |        |           | renal        |              |   Fax:        |
|        |        |           | disease) on  |              | [phone]  |
|        |        |           | dialysis     |              |               |
|        |        |           | (HCC)        |              |               |
+--------+--------+-----------+--------------+--------------+---------------+
 
 
 
 
 Encounter Details
 
 
+--------+-----------+----------------------+----------------------+----------------------+
| Date   | Type      | Department           | Care Team            | Description          |
+--------+-----------+----------------------+----------------------+----------------------+
| / | Hospital  |   [facility]    |   [facility],        | PVD (peripheral      |
| 2019 - | Encounter | [address]   | MD Maria Luisa Jarrett      | vascular disease)    |
|        |           | [facility] | [address]           | ([facility]) (Primary Dx);  |
| / |           |   [address]     | [place] [address]   | ESRD (end stage      |
| 2019   |           | [place] [address]   | [phone]         | renal disease) on    |
|        |           | [phone]         | [phone] (Fax)   | dialysis ([facility]);      |
|        |           |                      | Moiz Josue      | Anemia in ESRD       |
|        |           |                      | MD [address] | (end-stage renal     |
|        |           |                      |  Blvd  [place], [place]  | disease) ([facility]);      |
|        |           |                      | [address]  [phone]  | Hypoalbuminemia;     |
|        |           |                      |  [phone] (Fax)  | Electrolyte          |
 
|        |           |                      |                      | imbalance risk       |
+--------+-----------+----------------------+----------------------+----------------------+
 
 
 
 Social History
 
 
+---------------+------------+-----------+--------+------------------+
| Tobacco Use   | Types      | Packs/Day | Years  | Date             |
|               |            |           | Used   |                  |
+---------------+------------+-----------+--------+------------------+
| Former Smoker | Cigarettes | 1         | 30     | Quit: 2004 |
+---------------+------------+-----------+--------+------------------+
 
 
 
+---------------------+---+---+---+
| Smokeless Tobacco:  |   |   |   |
| Never Used          |   |   |   |
+---------------------+---+---+---+
 
 
 
+------------------------------+
| Comments: quite smoking  |
+------------------------------+
 
 
 
+-------------+-----------+---------+----------+
| Alcohol Use | Drinks/We | oz/Week | Comments |
|             | ek        |         |          |
+-------------+-----------+---------+----------+
| No          |           |         |          |
+-------------+-----------+---------+----------+
 
 
 
+------------------+---------------+
| Sex Assigned at  | Date Recorded |
| Birth            |               |
+------------------+---------------+
| Not on file      |               |
+------------------+---------------+
 as of this encounter
 
 Last Filed Vital Signs
 
 
+-------------------+----------------------+-------------------------+
| Vital Sign        | Reading              | Time Taken              |
+-------------------+----------------------+-------------------------+
| Blood Pressure    | 120/56               | 2019 11:40 AM PST |
+-------------------+----------------------+-------------------------+
| Pulse             | 64                   | 2019 11:40 AM PST |
+-------------------+----------------------+-------------------------+
| Temperature       | 36.4   C (97.5   F)  | 2019 11:40 AM PST |
+-------------------+----------------------+-------------------------+
| Respiratory Rate  | 18                   | 2019 11:40 AM PST |
 
+-------------------+----------------------+-------------------------+
| Oxygen Saturation | 97%                  | 2019 11:40 AM PST |
+-------------------+----------------------+-------------------------+
| Inhaled Oxygen    | -                    | -                       |
| Concentration     |                      |                         |
+-------------------+----------------------+-------------------------+
| Weight            | 65.5 kg (144 lb 6.4  | 2019  1:11 PM PST |
|                   | oz)                  |                         |
+-------------------+----------------------+-------------------------+
| Height            | 177.8 cm (5' 10")    | 2019  7:11 PM PST |
+-------------------+----------------------+-------------------------+
| Body Mass Index   | 20.72                | 2019  1:11 PM PST |
+-------------------+----------------------+-------------------------+
 in this encounter
 
 Discharge Summaries
 Prudence Nicholson MD-R2 - 2019  1:04 PM PSTFormatting of this note may be different fr
om the original.
 [facility]
 Service:  Hospitalist
 Resident Discharge Summary
 
 Date of Admission:  2019
 Date of Discharge:  
 
 Discharge Provider:  Prudence Nicholson MD-R2
 Treatment Team: 
 Consulting Physician: Srinivasan Jordan DPM
 Consulting Physician: Ethan Awad MD
 Admitting Provider: Luiza Rosales MD
 Discharge Diagnoses:   
 
 Principal Problem:
   PVD (peripheral vascular disease) ([facility])
 Active Problems:
   ESRD (end stage renal disease) (HCC)
   Type 2 diabetes mellitus with chronic kidney disease on chronic dialysis, with long-term 
current use of insulin (HCC)
   Anemia in ESRD (end-stage renal disease) (HCC)
   Hypertension, essential
   Chronic systolic congestive heart failure (HCC)
   COPD (chronic obstructive pulmonary disease) (HCC)
   Paroxysmal atrial fibrillation (HCC)
   CAD (coronary artery disease)
 Resolved Problems:
   * No resolved hospital problems. *
 
 BRIEF HISTORY OF PRESENTATION:  
      Patient Summary:  Per Dr. Rosales's H and P from 2019: '69 y/o F with h/o ESRD
 on HD T,,Sat (Dr. Asher), DM2, PAD s/p angioplasty of LLE and transmetatarsal amputation 
of left foot 18 by Dr. Jordan due to osteomyelitis, CAD, s/p MI, CABG, AVR , HFrE
F with last EF 20 to 25%, s/p AICD, AF on chronic anticoagulation who was sent to ED by Dr. Elliott for evaluation of right LE. Patient reports that at HD yesterday it was noted that he
r right toes were red and dusky. She went to see Dr. Elliott this morning and he was concerne
d that right toes could be ischemic or infected and referred to ED. '
 
 HOSPITAL COURSE:  
 Vascular surgery was consulted from the emergency department, they did a selective catheter
ization of the left common femoral artery and placed an SMA stent. The patient tolerated the
 procedure well. Podiatry was consulted, they recommended wound care consultation for the op
 
en wound on her left foot. They also stated that her right foot did not need a procedure for
 the toes at this time. Infectious disease is waiting for blood cultures and Gram stain and 
culture of the wound site to narrow her IV antibiotics. She reported improved pain, no short
ness of breath, no nausea and vomiting, she is making a small amount of urine and had a willie
l movement today. She would like to go home. Physical therapy cleared her to go home with [facility] assist. Nephrology was consulted and reported that they were amenable to administering he
r IV antibiotics with dialysis. Infectious disease was consulted and agreed to this plan. Up
on discharge the patient was eating, drinking, urinating, having bowel movements.she was not
 having fevers, nausea, or vomiting. 
 
 No prescriptions prior to admission. 
 
 DISCHARGE EXAM
 Vital Signs:
 /56 (BP Location: Right upper arm)  | Pulse 64  | Temp 97.5 F (36.4 C) (Axillary)
  | Resp 18  | Ht 1.778 m (5' 10")  | Wt 65.5 kg (144 lb 6.4 oz)  | SpO2 97%  | Breastfeedin
g? No  | BMI 20.72 kg/m 
 
 Physical Exam
 General Appearance: Alert and cooperative, sitting up in a chair by the bed and appears to 
be in no acute distress. 
 
 HEENNT: Normocephalic and atraumatic. Hearing grossly intact. No nasal discharge. No trache
al deviation. 
 
 Cardiovascular: Rhythm and rate are regular. 2/6 systolic murmur heard best over the left u
pper sternal border. No gallops or rubs appreciated. Peripheral pulses 2+ on the right. No l
ower extremity edema.
 
 Pulmonary/Chest: Lungs are clear to auscultation bilaterally. Effort is normal. Normal jadon
th sounds. 
 
 Abdominal: Soft, nontender, nondistended. Normoactive bowel sounds. No guarding or rebound 
tenderness. 
 
 Musculoskeletal: Normal range of motion. Normal muscular development. Patient with forefoot
 amputation on the left. Left foot wrapped in new dressing, C/D/I. Right foot with erythema 
over the great big toe and cyanosis over the second toe, improved from prior. 
 
 Neurological: CN II-XII grossly intact. Oriented to person, place, and time. 
 
 Skin: Skin is warm and dry. No rash noted. 
 
 Psychiatric:The patient was able to demonstrate good judgement and reason, without hallucin
ations, abnormal affect or abnormal behaviors during the examination. 
 
 DATA
 
 Recent Labs
 Lab 1938 19
 1543 
 WBC 3.90 3.39* 5.53 
 HGB 9.8* 9.5* 9.4* 
 HCT 30.1* 29.2* 28.4* 
 * 125* 147* 
 NEUTOPHILPCT  --  66.95  --  
 MONOPCT  --  11.96  --  
 
 
 Recent Labs
 Lab 19
 0538 19
 0453 19
 1543 
  140 139 
 K 3.9 4.2 4.0 
 CL 97* 101 100 
 CO2 31 28 29 
 BUN 13 26* 23 
 CREATININE 4.8* 6.8* 6.1* 
 PROT  --   --  6.2* 
 BILITOT  --   --  0.4 
 ALT  --   --  21 
 AST  --   --  24 
 
 Phosphorus:  
 Lab Results 
 Component Value Date 
  PHOS 3.6 2019 
 
 Invalid input(s): LABALBU
 
 Recent Labs
 Lab 19
 0538 
 MG 2.3 
 
 Recent Labs
 Lab 19
 1543 
 CKTOTAL 28* 
 
 Intake/Output Summary (Last 24 hours) at 19 1717
 Last data filed at 19 0850
  Gross per 24 hour 
 Intake              380 ml 
 Output                0 ml 
 Net              380 ml 
 
 Microbiology: Blood cultures - NGTD
 
 Radiology
 Us Lower Extremty  Arterial Right
 
 Result Date: 2019
 1. Right leg shows biphasic inflow with a greater than 50% stenosis at the origin of the dobbins
perficial femoral artery (137309).  Biphasic outflow. 2. Two vessel runoff to the right jeanne
t. Signed by: Eugenio Hoffman Sign Date/Time: 2019 3:11 PM
 
 PLAN
 
 Disposition:  Home
 Condition:  Stable
 Code Status:  Full Code
 
 No discharge procedures on file.
 
 Follow up:
 [facility]
 
 [address] [facility] [id number] [phone]
 Follow up in 3 week(s)
 
 Ivy Couch, DO
 [address], [address]
 [facility] [address]
 [phone]
 
 Ivy Couch, DO
 [address]
 [facility] [address]
 [phone]
 
 In 1 week
 
 Andrés Elliott MD
 [address] [facility] [id number] [phone]
 
 Call office for appointment
 
  
 Medication List 
  
 START taking these medications  
 cefTAZidime 2 g injection
 QTY:  1 each
 Refills:  0
 Commonly known as:  FORTAZ
 Inject 2 g into the muscle After Hemodialysis (see admin instructions) for 7 days.
  
 vancomycin IVPB
 Refills:  0
 Commonly known as:  VANCOCIN
 Inject 750 mg into the vein After Hemodialysis (see admin instructions) for 7 days. Dilute 
in appropriate volume of IV fluid and give by slow IV infusion.
  
  
 CHANGE how you take these medications  
 losartan 25 MG tablet
 QTY:  30 tablet
 Refills:  0
 Commonly known as:  COZAAR
 Take 1 tablet by mouth daily.
 What changed:  how much to take
  
  
 CONTINUE taking these medications  
 * albuterol 108 (90 Base) MCG/ACT inhaler
 Refills:  0
 Commonly known as:  PROVENTIL HFA;VENTOLIN HFA
  
 * VENTOLIN  (90 Base) MCG/ACT inhaler
 Refills:  0
 Generic drug:  albuterol
  
 apixaban 2.5 MG tablet
 
 QTY:  60 tablet
 Refills:  0
 Commonly known as:  ELIQUIS
 Take 1 tablet by mouth 2 (two) times daily.
  
 atorvastatin 40 MG tablet
 QTY:  30 tablet
 Refills:  0
 Commonly known as:  LIPITOR
 Take 1 tablet by mouth nightly.
  
 bisacodyl 10 MG suppository
 Refills:  0
 Commonly known as:  DULCOLAX
  
 calcium acetate 667 MG capsule
 Refills:  0
 Commonly known as:  PHOSLO
  
 carvedilol 12.5 MG tablet
 QTY:  60 tablet
 Refills:  0
 Doctor's comments:  Hold for SBP less than 100
 Hold for HR less than 60
 Commonly known as:  COREG
 Take 1 tablet by mouth 2 (two) times daily with meals.
  
 clopidogrel 75 MG tablet
 QTY:  30 tablet
 Refills:  11
 Commonly known as:  PLAVIX
 Take 1 tablet by mouth daily.
  
 esomeprazole 20 MG capsule
 Refills:  0
 Commonly known as:  NEXIUM
  
 HYDROcodone-acetaminophen 5-325 MG per tablet
 QTY:  30 tablet
 Refills:  0
 Commonly known as:  NORCO
 Take 1 tablet by mouth every 4 (four) hours as needed.
  
 insulin aspart 100 UNIT/ML injection
 Refills:  0
 Commonly known as:  NOVOLOG
  
 insulin degludec 100 UNIT/ML injection
 Refills:  0
 Commonly known as:  TRESIBA
  
 isosorbide mononitrate 60 MG 24 hr tablet
 QTY:  30 tablet
 Refills:  1
 Take 1 tablet by mouth daily.
  
 loperamide 2 MG capsule
 Refills:  0
 Commonly known as:  IMODIUM
  
 
 LORazepam 1 MG tablet
 QTY:  30 tablet
 Refills:  0
 Commonly known as:  ATIVAN
 Take 1 tablet by mouth every 8 (eight) hours as needed for Anxiety.
  
 nitroGLYCERIN 0.4 MG SL tablet
 QTY:  30 tablet
 Refills:  0
 Doctor's comments:  Hold for SBP less than 100
 Commonly known as:  NITROSTAT
 Place 1 tablet under the tongue every 5 (five) minutes as needed for Chest pain.
  
 nortriptyline 25 MG capsule
 Refills:  0
 Commonly known as:  PAMELOR
  
 ondansetron 4 MG disintegrating tablet
 Refills:  0
 Commonly known as:  ZOFRAN-ODT
  
 oxybutynin 5 MG tablet
 Refills:  0
 Commonly known as:  DITROPAN
  
 prochlorperazine 5 MG tablet
 QTY:  60 tablet
 Refills:  11
 Doctor's comments:  Please consider 90 day supplies to promote better adherence
 TAKE ONE TABLET BY MOUTH TWICE DAILY AS NEEDED FOR NAUSEA
  
 ranitidine 150 MG tablet
 Refills:  0
 Commonly known as:  ZANTAC
  
 rOPINIRole 0.25 MG tablet
 Refills:  0
 Commonly known as:  REQUIP
  
 SLOW-MAG 71.5-119 MG Tbec
 Refills:  0
 Generic drug:  Magnesium Cl-Calcium Carbonate
  
 Vitamin D3 5000 units Caps
 Refills:  0
  
 Vortioxetine HBr 10 MG Tabs
 Refills:  0
  
  
 * This list has 2 medication(s) that are the same as other medications prescribed for you. 
Read the directions carefully, and ask your doctor or other care provider to review them wit
h you. 
  
  
  
 You might also be taking other medications not listed above. If you have questions about an
y of your other medications, talk to the person who prescribed them or your Primary Care Pro
vider. 
  
 
  
  
 STOP taking these medications  
 aspirin 81 MG EC tablet
  
  
  
 Where to Get Your Medications 
  
 Information about where to get these medications is not yet available  
 Ask your nurse or doctor about these medications
  cefTAZidime 2 g injection
  vancomycin IVPB
  
 
 Prudence Nicholson MD-R2
 2019
 5:17 PM
 
 
 Associated attestation - Moiz Josue MD - 2019  5:12 PM PSTPatient seen an
d examined along with residents prior to discharge. Discussed with Dr. Elliott and  Dr. Asher. 
Patient will receive IV antibiotics ceftazidime and vancomycin with each HD. She insists on 
going home and is cleared for discharge by Dr. Asher and Dr. Elliott. 
 I agree with the discharge summary of Dr. Nicholson. in this encounter
 
 Discharge Instructions
 Nesha Vanegas RN - 2019Formatting of this note may be different from the original.
 
 Peripheral Angiography
 Peripheral angiography is an outpatient procedure that makes a   map  of the vessels (ar
teries) in your lower body, legs, and arms, using X-ray and dye.This map can show where bloo
d flow may be blocked.
 Talk with your healthcare provider about the risks and complications of angiography. 
 Before the procedure
 Prepare for the peripheral angiography as follows:
  Tell your healthcare provider about all medicines you take and any allergies you may hav
e.
  Follow any directions you  re given for not eating or drinking before the procedure. If
 your provider says to take your normal medicines, swallow them with only small sips of wate
r.
  Arrange for a family member or friend to drive you home.
 During the procedure
 Here is what to expect:
  
 You may get medicine through an IV (intravenous) line to relax you. You  re given an injec
tion to numb the insertion site. Then, a tiny skin cut (incision) is made near an artery in 
your groin.
  Your provider inserts a thin tube (catheter) through the incision. He or she then thread
s the catheter into an artery while looking at a video monitor.
  Contrast   dye  is injected into the catheter to confirm position. You may feel warmt
h or pressure in your legs and back. You lie still as X-rays are taken. The catheter is then
 taken out.
 After the procedure
 You  ll be taken to a recovery area. A healthcare provider will apply pressure to the site
 for about 10 minutes. Your healthcare provider will tell you how long to lie down and keep 
the insertion site still.Your healthcare provider will discuss the results with you soon a
fter the procedure.
 Back at home
 On the day you get home, don  t drive, don  t exercise, avoid walking and taking stairs, 
 
and avoid bending and lifting. Your healthcare provider may give you other care instructions
.
 Call your healthcare provider
 Call your healthcare provider right away if:
  You notice a lump or bleeding at the insertion site
  You feel pain at the insertion site
  You become lightheaded or dizzy
  You have leg pain or numbness
  You do not urinate in 8 hours 
 Date Last Reviewed: 2016-2018 [employer]. [address] [place] [address]. All righ
ts reserved. This information is not intended as a substitute for professional medical care.
 Always follow your healthcare professional's instructions.
 
 Peripheral Arterial Angioplasty and Stent
 
 Peripheral arterial angioplasty is a procedure done to treat a narrowed or blocked artery i
n your arm or leg. This brings blood flow back to your arm or leg. It also helps ease sympto
ms. Sometimes a metal mesh tube called a stent may be put in your artery. This holds your ar
dirk open. The procedure is done by a specially trained doctor called an interventional radi
ologist. This sylvia doctor trained and certified by the American Board of Radiology to use m
inimally invasive image-guided procedures to diagnose and treat diseases.
 Before your procedure
 Follow any instructions you are given on how to get ready. This can include following any d
irections you  re given for not eating or drinking before the procedure.
 Tell your healthcare provider if you:
  Are allergic to X-ray dye (contrast medium) or other medicines
  Are breastfeeding
  Are pregnant or think you may be pregnant
  Have had any recent illnesses
  Have any medical conditions
 Tell your healthcare provider about any medicines you are taking. You may need to stop taki
ng all or some of these before the procedure. This includes:
  All prescription medicines
  Herbs, vitamins, and other supplements
  Over-the-counter medicines such as aspirin or ibuprofen
  Street drugs
 During your procedure
  An IV line is put into your vein. This is to give you fluids and medicines. You may be g
iven medicine to help you relax and make you sleepy (sedation). Medicine will be put on your
 skin where the cut (incision) will be done. This is to keep you from feeling pain at the si
te.
  A very small incision is made at the insertion site. A thin, flexible tube (catheter) is
 put through the incision into your artery. The radiologist watches the catheter  s movemen
t on a screen.
  X-ray dye is injected through the catheter into your artery. This helps to see the arter
y more clearly on the X-rays. The radiologist uses these images as a guide. He or she moves 
the catheter to the narrowed or blocked part of your artery.
  When the catheter reaches the narrowed or blocked area, the radiologist inflates a speci
al balloon attached to the catheter. This is called angioplasty. Inflating the balloon widen
s the passage through the artery.
  Sometimes the artery won't stay open after the angioplasty. In this case, a stent is nee
ded. A catheter with a stent attached is threaded through the artery. When the stent is in t
he right place, it is opened.
  When the procedure is done, all catheters and balloons are removed. The stent stays in p
lace. Pressure is put on the insertion site for 15 minutes to stop bleeding.
 After your procedure
  Your healthcare provider will tell you how long to lie down and keep the insertion site 
still.
  You may stay in the hospital for a few hours or overnight.
 
  Drink plenty of fluids to help flush the X-ray dye from your body.
  After you go home, care for the insertion site as directed.
  You may need to take aspirin or a blood-thinning medicine (anticoagulant) after the proc
edure. This is to help prevent blood clots in the stent. Talk with your healthcare provider 
about this.
 Possible risks and complications
 All procedures have some risk. Possible risks of peripheral arterial angioplasty and stent 
include:
  Bleeding or infection at the catheter insertion site
  Damage to the artery, including worsening of the blockage
  Problems because of X-ray dye, these include allergic reaction or kidney damage
  Artery becomes blocked again (restenosis), which often happens within 6 to 18 months
  Low-level exposure to X-ray radiation, which is considered a safe level 
 Date Last Reviewed: 2017-2018 [employer]. [address] [place] [address], [place] [id number]. All righ
ts reserved. This information is not intended as a substitute for professional medical care.
 Always follow your healthcare professional's instructions.
 
 I certify that Cherie Villalobos  is under my care and that I had a face to face encounter on 
19  that meets the physician face to face encounter requirements.  I certify that based
 on my findings , the patient is in need of intermittent skilled services and a plan for fur
nishing these services to include, but not limited to the services listed in the questions b
elow:  
 Primary diagnosis for home health: PVD: wound care/ foot infection
 Additional diagnosis: 
 Services needed: wound care for right and left foot
 Patient is homebound because he/she cannot leave home without assistance of another individ
ual and leaving the home requires a considerable and taxing effort. Patient needs the assist
ance of another individual to leave home because:  Limited mobiltiy, fall risk, pain with mo
[facility]
 Physician assuming care for Home Health services: Ivy Couch
 
 in this encounter
 
 Medications at Time of Discharge
 
 
+----------------------+----------------------+----------+---------+----------+-----------+
| Medication           | Sig.                 | Disp.    | Refills | Start    | End Date  |
|                      |                      |          |         | Date     |           |
+----------------------+----------------------+----------+---------+----------+-----------+
|   albuterol          | Inhale 2 puffs into  |          |         |          |           |
| (PROVENTIL           | the lungs every 4    |          |         |          |           |
| HFA;VENTOLIN HFA)    | (four) hours as      |          |         |          |           |
| 108 (90 Base)        | needed for Wheezing. |          |         |          |           |
| MCG/ACT              |                      |          |         |          |           |
| inhalerIndications:  |                      |          |         |          |           |
| states uses 2x       |                      |          |         |          |           |
| monthly average      |                      |          |         |          |           |
+----------------------+----------------------+----------+---------+----------+-----------+
|   apixaban (ELIQUIS) | Take 1 tablet by     |   60     | 0       | 20 |           |
|  2.5 MG tablet       | mouth 2 (two) times  | tablet   |         | 18       |           |
|                      | daily.               |          |         |          |           |
+----------------------+----------------------+----------+---------+----------+-----------+
|   carvedilol (COREG) | Take 1 tablet by     |   60     | 0       | 20 |  |
|  12.5 MG tablet      | mouth 2 (two) times  | tablet   |         | 19       | 0         |
|                      | daily with meals.    |          |         |          |           |
+----------------------+----------------------+----------+---------+----------+-----------+
|   esomeprazole       | Take 1 capsule by    |          |         |          |           |
| (NEXIUM) 40 MG       | mouth every day      |          |         |          |           |
 
| capsule              |                      |          |         |          |           |
+----------------------+----------------------+----------+---------+----------+-----------+
|                      | Take 1 tablet by     |   30     | 0       | 20 |           |
| HYDROcodone-acetamin | mouth every 4 (four) | tablet   |         | 19       |           |
| ophen (NORCO) 5-325  |  hours as needed.    |          |         |          |           |
| MG per tablet        |                      |          |         |          |           |
+----------------------+----------------------+----------+---------+----------+-----------+
|   insulin aspart     | Inject  into the     |          |         |          |           |
| (NOVOLOG) 100        | skin 3 (three) times |          |         |          |           |
| UNIT/ML injection    |  daily before meals. |          |         |          |           |
|                      |  Sliding scale       |          |         |          |           |
+----------------------+----------------------+----------+---------+----------+-----------+
|   insulin degludec   | Inject 21 units      |          |         |          |           |
| (TRESIBA) 100        | every night          |          |         |          |           |
| UNIT/ML injection    |                      |          |         |          |           |
+----------------------+----------------------+----------+---------+----------+-----------+
|   isosorbide         | Take 1 tablet by     |   30     | 1       | 20 |  |
| mononitrate (IMDUR)  | mouth daily.         | tablet   |         | 18       | 9         |
| 60 MG 24 hr tablet   |                      |          |         |          |           |
+----------------------+----------------------+----------+---------+----------+-----------+
|   losartan (COZAAR)  | Take 100 mg by mouth |          |         | 20 |           |
| 100 MG tablet        |  daily.              |          |         | 19       |           |
+----------------------+----------------------+----------+---------+----------+-----------+
|   nitroGLYCERIN      | Place 1 tablet under |   30     | 0       | 20 |  |
| (NITROSTAT) 0.4 MG   |  the tongue every 5  | tablet   |         | 19       | 0         |
| SL tablet            | (five) minutes as    |          |         |          |           |
|                      | needed for Chest     |          |         |          |           |
|                      | pain.                |          |         |          |           |
+----------------------+----------------------+----------+---------+----------+-----------+
|   nortriptyline      | Take 25-75 mg by     |          |         |          |           |
| (PAMELOR) 25 MG      | mouth See Admin      |          |         |          |           |
| capsule              | Instructions. Takes  |          |         |          |           |
|                      | 25 mg by mouth every |          |         |          |           |
|                      |  morning and 75 mg   |          |         |          |           |
|                      | every night          |          |         |          |           |
+----------------------+----------------------+----------+---------+----------+-----------+
|   ondansetron        | Take 4 mg by mouth   |          |         | 20 |           |
| (ZOFRAN-ODT) 4 MG    | every 8 (eight)      |          |         | 18       |           |
| disintegrating       | hours as needed.     |          |         |          |           |
| tablet               |                      |          |         |          |           |
+----------------------+----------------------+----------+---------+----------+-----------+
|   oxybutynin         | Take 7.5 mg by mouth |          |         |          |           |
| (DITROPAN) 5 MG      |  2 (two) times       |          |         |          |           |
| tablet               | daily.               |          |         |          |           |
+----------------------+----------------------+----------+---------+----------+-----------+
|   prochlorperazine 5 | TAKE ONE TABLET BY   |   60     | 11      | 20 |           |
|  MG tablet           | MOUTH TWICE DAILY AS | tablet   |         | 19       |           |
|                      |  NEEDED FOR NAUSEA   |          |         |          |           |
+----------------------+----------------------+----------+---------+----------+-----------+
|   rOPINIRole         | Take 0.75 mg by      |          |         |          |           |
| (REQUIP) 0.25 MG     | mouth nightly.       |          |         |          |           |
| tablet               |                      |          |         |          |           |
+----------------------+----------------------+----------+---------+----------+-----------+
|   TRINTELLIX 20 MG   | Take 20 mg by mouth  |          |         | 20 |           |
| TABS                 | every evening.       |          |         | 19       |           |
+----------------------+----------------------+----------+---------+----------+-----------+
|   cefTAZidime        | Inject 2 g into the  |   1 each |         | 20 |  |
| (FORTAZ) 2 g         | muscle After         |          |         | 19       | 9         |
| injection            | Hemodialysis (see    |          |         |          |           |
|                      | admin instructions)  |          |         |          |           |
 
|                      | for 7 days.          |          |         |          |           |
+----------------------+----------------------+----------+---------+----------+-----------+
|   vancomycin         | Inject 750 mg into   |          | 0       | 20 |  |
| (VANCOCIN) IVPB      | the vein After       |          |         | 19       | 9         |
|                      | Hemodialysis (see    |          |         |          |           |
|                      | admin instructions)  |          |         |          |           |
|                      | for 7 days. Dilute   |          |         |          |           |
|                      | in appropriate       |          |         |          |           |
|                      | volume of IV fluid   |          |         |          |           |
|                      | and give by slow IV  |          |         |          |           |
|                      | infusion.            |          |         |          |           |
+----------------------+----------------------+----------+---------+----------+-----------+
|   albuterol          | Ventolin HFA 90      |          |         |          |  |
| (VENTOLIN HFA) 108   | mcg/actuation        |          |         |          | 9         |
| (90 Base) MCG/ACT    | aerosol inhaler      |          |         |          |           |
| inhaler              | Inhale 2 puffs every |          |         |          |           |
|                      |  4 hours by          |          |         |          |           |
|                      | inhalation route as  |          |         |          |           |
|                      | needed.              |          |         |          |           |
+----------------------+----------------------+----------+---------+----------+-----------+
|   atorvastatin       | Take 1 tablet by     |   30     | 0       | 20 |  |
| (LIPITOR) 40 MG      | mouth nightly.       | tablet   |         | 19       | 9         |
| tablet               |                      |          |         |          |           |
+----------------------+----------------------+----------+---------+----------+-----------+
|   bisacodyl          | Place 10 mg          |          |         |          |  |
| (DULCOLAX) 10 MG     | rectally.            |          |         |          | 9         |
| suppository          |                      |          |         |          |           |
+----------------------+----------------------+----------+---------+----------+-----------+
|   calcium acetate    | 667 mg 3 (three)     |          |         | 20 |  |
| (PHOSLO) 667 MG      | times daily with     |          |         | 16       | 9         |
| capsule              | meals.               |          |         |          |           |
+----------------------+----------------------+----------+---------+----------+-----------+
|   Cholecalciferol    | Take 5,000 capsules  |          |         |          |  |
| (VITAMIN D3) 5000    | by mouth every other |          |         |          | 9         |
| UNITS CAPS           |  day.                |          |         |          |           |
+----------------------+----------------------+----------+---------+----------+-----------+
|   clopidogrel        | Take 1 tablet by     |   30     | 11      | 20 |  |
| (PLAVIX) 75 MG       | mouth daily.         | tablet   |         | 18       | 9         |
| tablet               |                      |          |         |          |           |
+----------------------+----------------------+----------+---------+----------+-----------+
|   loperamide         | 2 mg as needed.      |          |         |          |  |
| (IMODIUM) 2 MG       |                      |          |         |          | 9         |
| capsule              |                      |          |         |          |           |
+----------------------+----------------------+----------+---------+----------+-----------+
|   LORazepam (ATIVAN) | Take 1 tablet by     |   30     | 0       | 20 | 02/15/201 |
|  1 MG tablet         | mouth every 8        | tablet   |         | 19       | 9         |
|                      | (eight) hours as     |          |         |          |           |
|                      | needed for Anxiety.  |          |         |          |           |
+----------------------+----------------------+----------+---------+----------+-----------+
|   Magnesium          | Take 1 tablet by     |          |         |          |  |
| Cl-Calcium Carbonate | mouth every other    |          |         |          | 9         |
|  (SLOW-MAG) 71.5-119 | day.                 |          |         |          |           |
|  MG TBEC             |                      |          |         |          |           |
+----------------------+----------------------+----------+---------+----------+-----------+
|   ranitidine         | ranitidine 150 mg    |          |         |          |  |
| (ZANTAC) 150 MG      | tablet Take 1 tablet |          |         |          | 9         |
| tablet               |  twice a day by oral |          |         |          |           |
|                      |  route.              |          |         |          |           |
+----------------------+----------------------+----------+---------+----------+-----------+
|   Vortioxetine HBr   | 10 mg nightly.       |          |         |          |  |
 
| 10 MG TABS           |                      |          |         |          | 9         |
+----------------------+----------------------+----------+---------+----------+-----------+
 as of this encounter
 
 Progress Notes
 Andrés Elliott MD - 2019  7:16 AM PSTFormatting of this note may be different from the 
original.
 
 
 CONSULT NOTE
 2019
 7:16 AM
 
 PRIMARY PHYSICIAN
 Ivy Couch
 
 CONSULT REQUEST FROM
 Moiz Josue MD
 
 HISTORY OF PRESENT ILLNESS
 The patient is a 68 y.o.-year-old female  with history of ESRD on HD via fistula Tue/Thu/Sa
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
 CABG. Recent LLE angioplasty She had been on antibiotics for L foot first digit infection w
ith possible osteomyelitis since November (Oral levofloxacin and clindamycin then transition
ed to IV cefepime with HD until 18, no improvement noted on antibiotics). She had follo
wed with podiatrist and underwent L TMA on 18, intraoperative findings encountered pur
ulence at first metatarsal.
 
 She was Admitted on 18 to [facility] and had resection of the toe.  discha
rged on 19She has problem with tremors on the hands And on and off confusion. Ct head ne
k[place]all on 18 EEG done show no seizure just diffuse slowing .
 The patient has some nausea and on and off chest pain stable. The patient felt better was
 discharged on 2019 
 
 readmitted on 19 and discharged on 19 due to chest pain and noted to be stable.
 
 
 Wound culture shows skin sherwin.She had 20days off iv vancomycin and 16 days of ceftazidime 
Had 4 days of cefepime.Orignally was to complete to iv vancmycin 500 mg iv and ceftazidime 2
 gm iv after each hemodialysis unitluntil 18 for the infected foot. Tbut was stopped as
 the line of resection was free of infecton . And off antibiotics since 19
 
 
 The patient developed new right foot lesion which occurred between  until 2019. Patient has memory problems does not know what happened but the right foot is swo
llen and red and tender will need to be taken care of as soon as possible cultures will be d
one. We will have MRSA screen CBC CMP ESR CRP blood culture  2
 
 cultures of the right foot done and right ankle done
 
 will have patient go to [facility] ER for evaluation of ischemic right foot versus severe infec
tion of the right fourth toe.
 
 Wound examination right leg with 21 x 5 cm abrasions ankle with open wound big toe right fo
ot with circumference of 10 cm with blister of 0.5 x 0.2 cm with redness of 5 cm. Second toe
 of the right foot with pallor of 4 x 2 cm.Left foot with amputation site healing well 11 x 
1 cm
 
 I have spoken to Dr. Srinivasan Jordan podiatrist will see her when the patient is admitted to 
[facility].
 
 
 Patient will go to the emergency room to see Dr. Saenz whom have coordinated her to be see
n there will have arterial studies of the right leg is concern for ischemic will be on vanco
mycin, ceftazidme
 
 And metronidazole. 
 ADmitted to [facility] on 19
 Due to concern with Right footinfection.
 infection, thus an infectious disease (ID) consult done for further
 evaluation and management.
 Day 3 of vancomycin, metronidazole and ceftazidime
 S/p  Ultrasound guided access of left common femoral artery
 Aortogram SElective catheterization of right common femoral artery with right leg runoff.Ri
ght SFA stenting using 6x80 mm self expanding stent
 Drug eluting balloon angioplasty of right SFA using 4x100 mm Lutonix balloon
 The patient feeling better today 
 Pending culture no growth will complete one more week o
 Ceftazidime and vancomycin 
 wound care with silver sulfadiazine cream. 
 Past Medical History 
 Diagnosis Date 
   Acute MI ([facility])  
  with stenting x 1 
   Anemia associated with chronic renal failure 2016 
   Atrial fibrillation ([facility])  
   Chronic kidney disease  
   Congestive heart failure ([facility])  
   COPD (chronic obstructive pulmonary disease) ([facility])  
   COPD (chronic obstructive pulmonary disease) ([facility]) 2018 
   Coronary artery disease  
  stent placements: 3 total 
   Depression  
   Diabetes other 
  abstracted 
   Diabetes mellitus, type 2 ([facility])  
   ESRD on peritoneal dialysis ([facility])  
   GERD (gastroesophageal reflux disease)  
   Hemodialysis patient ([facility]) 10/2016 
  Stopped peritoneal and restarted hemodialysis 
   Hemorrhage of gastrointestinal tract, unspecified 2017 
  low GI, rectal bleeding 
   High blood pressure other 
  abstracted 
   High cholesterol other 
  abstracted 
   Hyperlipidemia  
   Hypertension  
   Hyponatremia 2017 
   Myocardial infarction ([facility]) 2017 
   Other chronic pain  
  feet,  
   Pain of left hip joint 2016 
  due to hip fracture and replacement 
   Partial small bowel obstruction ([facility]) 2017 
  resolved 
   Renal failure  
  Stage 5.   Fistula in the JAN - not on dialysis yet. 
   Unspecified visual disturbance  
  glasses 
 
 
 Past Surgical History 
 Procedure Laterality Date 
   AV FISTULA PLACEMENT   
  left upper arm AV fistula - failed 
   AV FISTULA REPAIR N/A 10/25/2016 
  Procedure: AV FISTULA - GRAFT REPAIR/REVISION;  Surgeon: Kirt Mclaughlin MD;  Location: [facility]
IN OR;  Service: Vascular;  Laterality: N/A;  Superficialization and revision of left arm fi
stula 
   CARDIAC CATHETERIZATION  2017 
   CARPAL TUNNEL RELEASE Bilateral other 
  abstracted 
   CATARACT EXTRACTION Bilateral  
   CATHETER REMOVAL N/A 2016 
  Procedure: DIALYSIS CATHETER - REMOVAL;  Surgeon: Kirt Mclaughlin MD;  Location: [facility] OR; 
 Service: Vascular;  Laterality: N/A;  PD cath removal 
   CHOLECYSTECTOMY  other 
  abstracted 
   COLONOSCOPY   
   CORONARY ARTERY BYPASS GRAFT   
   CORONARY STENT PLACEMENT  2017 
  Drug Elutin stents to OM, 1 stent to prox. LAD 
   EYE SURGERY   
   FOOT AMPUTATION Left 2018 
  Procedure: FOREFOOT - AMPUTATION;  Surgeon: Srinivasan Jordan DPM;  Location: [facility] MAIN OR;
  Service: Podiatry;  Laterality: Left; 
   HARDWARE PRESENT   
  stent placements x 3, Left hip replacement, vascular stents 2 in left leg 
   HIP ARTHROPLASTY Left 2016 
  Procedure: HIP - ARTHROPLASTY(ALLISON);  Surgeon: Uriel Roa MD;  Location: [facility] MAIN 
OR;  Service: Orthopedics;  Laterality: Left; 
   HYSTERECTOMY  1998 
  complete 
   PACEMAKER INSERTION   
  [employer] pacemaker/defib 
   PERITONEAL CATHETER INSERTION  8/15/2013 
   PERITONEAL CATHETER INSERTION N/A 2016 
  Procedure: LAPAROSCOPIC - PERITONEAL DIALYSIS CATH INSERTION;  Surgeon: Kirt Mclaughlin MD;  Lo
cation: [facility] MAIN OR;  Service: Vascular;  Laterality: N/A;  Removal of old catheter 
   SHOULDER SURGERY Right  
  frozen shoulder 
   UNLISTED PROCEDURE ARTHROSCOPY   
  total Left hip 
   vascular stents Left 2017 
  leg (2 stents) 
   VASCULAR SURGERY   
 
 Current Facility-Administered Medications 
 Medication Dose Route Frequency Provider Last Rate Last Dose 
   acetaminophen (TYLENOL) tablet 650 mg  650 mg Oral Q6H PRN Luiza Rosales MD     
  Or 
   acetaminophen (TYLENOL) suppository 650 mg  650 mg Rectal Q6H PRN Luiza Rosales MD
     
   albuterol (PROVENTIL HFA;VENTOLIN HFA) inhaler  2 puff Inhalation Q4H PRN Luiza jhaveri MD     
   apixaban (ELIQUIS) tablet 2.5 mg  2.5 mg Oral BID Prudence Nicholson MD-R2   2.5 mg at 2051 
   aspirin EC tablet 81 mg  81 mg Oral Daily with breakfast Kirt Mclaughlin MD   81 mg at  1400 
   atorvastatin (LIPITOR) tablet 40 mg  40 mg Oral Nightly Luiaz Rosales MD   40 mg a
t 19 
 
   calcium acetate (PHOSLO) capsule 667 mg  667 mg Oral TID  Luiza Rosales MD   667
 mg at 19 
   carvedilol (COREG) tablet 12.5 mg  12.5 mg Oral BID KAYLEE Rosales MD   12.5 mg 
at 19 
   cefTAZidime (FORTAZ) 2 g in sodium chloride (IV) 0.9 % 50 mL IVPB  2 g Intravenous Afte
r Hemodialysis (see admin instructions) Andrés Elliott MD     
   cholecalciferol (VITAMIN D-3) tablet 5,000 Units  5,000 Units Oral Daily Luiza rivas MD   5,000 Units at 19 1400 
   clopidogrel (PLAVIX) tablet 75 mg  75 mg Oral Daily Kirt Mclaughlin MD   75 mg at 19 1
401 
   dextrose 10 % infusion   Intravenous Continuous PRN Luiza Rosales MD     
   dextrose 50 % solution 12 mL  12 mL Intravenous PRN Luiza Rosales MD     
   dextrose 50 % solution 25 mL  25 mL Intravenous PRN Luiza Rosales MD     
   famotidine (PEPCID) tablet 10 mg  10 mg Oral Daily Luiza Rosales MD   10 mg at  1401 
   fentaNYL (SUBLIMAZE) injection 25 mcg  25 mcg Intravenous Q1H PRN Kirt Mclaughlin MD     
  Or 
   fentaNYL (SUBLIMAZE) injection 50 mcg  50 mcg Intravenous Q1H PRN Kirt Mclaughlin MD     
   glucagon (GLUCAGEN) injection 0.5 mg  0.5 mg Intramuscular PRN Luiza Rosales MD   
  
   glucagon (GLUCAGEN) injection 1 mg  1 mg Intramuscular PRN Luiza Rosales MD     
   HYDROcodone-acetaminophen (NORCO) 5-325 MG per tablet 1 tablet  1 tablet Oral Q4H PRN STEVEN Mclaughlin MD   1 tablet at 19 0626 
  Or 
   HYDROcodone-acetaminophen (NORCO)  MG per tablet 1 tablet  1 tablet Oral Q4H PRN 
Kirt Mclaughlin MD   1 tablet at 19 
   HYDROmorphone (DILAUDID) injection 0.5 mg  0.5 mg Intravenous Q3H PRN Luiza Rosales MD     
  Or 
   HYDROmorphone (DILAUDID) injection 1 mg  1 mg Intravenous Q3H PRN Luiza Rosales MD
     
   insulin glargine (LANTUS) injection 15 Units  15 Units Subcutaneous Nightly Luiza doran MD   15 Units at 19 
   insulin lispro (human) (HUMALOG) injection 0-3 Units  0-3 Units Subcutaneous Nightly 
leonel Rosales MD   1 Units at 19 
   insulin lispro (human) (HUMALOG) injection 0-6 Units  0-6 Units Subcutaneous TID AC She
kelley Rosales MD   1 Units at 19 171 
   isosorbide mononitrate (IMDUR) 24 hr tablet 60 mg  60 mg Oral Daily Luiza Rosales MD   60 mg at 19 1402 
   loperamide (IMODIUM) capsule 2 mg  2 mg Oral 4x Daily PRN Luiza Rosales MD     
   LORazepam (ATIVAN) tablet 1 mg  1 mg Oral Q8H PRN Luiza Rosales MD   1 mg at  0839 
   metroNIDAZOLE (FLAGYL) tablet 500 mg  500 mg Oral 3 times per day Andrés Elliott MD   500 
mg at 19 0557 
   nitroGLYCERIN (NITROSTAT) SL tablet 0.4 mg  0.4 mg Sublingual Q5 Min PRN Luiza rivas MD     
   nortriptyline (PAMELOR) capsule 25 mg  25 mg Oral QAM Kirt Mclaughlin MD   Stopped at  1007 
  And 
   nortriptyline (PAMELOR) capsule 75 mg  75 mg Oral Nightly Kirt Mclaughlin MD   75 mg at  
   ondansetron (ZOFRAN-ODT) disintegrating tablet 4 mg  4 mg Oral Q6H PRN Kirt Mclaughlin MD   
  
  Or 
   ondansetron (ZOFRAN) injection 4 mg  4 mg Intravenous Q6H PRN Kirt Mclaughlin MD   4 mg at 0
19 223 
   oxybutynin (DITROPAN) tablet 2.5 mg  2.5 mg Oral BID Luiza Rosales MD   2.5 mg at 
19 
   pantoprazole (PROTONIX) EC tablet 40 mg  40 mg Oral QAM AC Kirt Mclaughlin MD   40 mg at  0557 
 
   rOPINIRole (REQUIP) tablet 0.75 mg  0.75 mg Oral Nightly Luiza Rosales MD   0.75 m
g at 19 
   sodium chloride (PF) 0.9 % flush 10 mL  10 mL Intravenous Q8H Luiza Rosales MD   1
0 mL at 19 1130 
   vancomycin (VANCOCIN) 500 mg in sodium chloride (IV) 0.9 % 100 mL IVPB  500 mg Intraven
ous See Admin Instructions Beatriz Luciano, [facility]     
   Vortioxetine HBr TABS 10 mg  10 mg Oral Nightly Luiza Rosales MD   Stopped at 01/2
4/19 2200 
   zolpidem (AMBIEN) tablet 5 mg  5 mg Oral Nightly PRN Kirt Mclaughlin MD     
 
 Allergies 
 Allergen Reactions 
   Quinapril Hcl Anaphylaxis 
   Digoxin And Related Other (See Comments) 
   toxic 
   Metaxalone Other (See Comments) 
   Morphine Mental Changes 
   Make her crazy/ "funny feeling in my head"
 Has used hydromorphone in-house 
   Pantoprazole Sodium Nausea and Vomiting 
   Penicillins Rash 
   Quinapril Hcl Edema 
   Facial Edema 
   Sudafed [Pseudoephedrine Hcl] Rash 
 
 Social History 
 
 Social History 
   Marital status:  
   Spouse name: N/A 
   Number of children: N/A 
   Years of education: N/A 
 
 Occupational History 
   Not on file. 
 
 Social History Main Topics 
   Smoking status: Former Smoker 
   Packs/day: 1.00 
   Years: 30.00 
   Types: Cigarettes 
   Quit date: 2004 
   Smokeless tobacco: Never Used 
    Comment: quite smoking  
   Alcohol use No 
   Drug use: No 
   Sexual activity: No 
 
 Other Topics Concern 
   Not on file 
 
 Social History Narrative 
  She lives with . 2 kids. Worked in banking 
 
 No smoking. No alcohol intake. Recreational Drug Use
 No Travel
 NO Pets
 Immunization History 
 Administered Date(s) Administered 
   Hepatitis B Adult 2016 
 
   Influenza, Trivalent W/Preservative 2016 
   Pneumococcal Polysaccharide 23-valent 2012 
  
 
 Family History 
 Problem Relation Age of Onset 
   High cholesterol Father  
   Hypertension Father  
   Diabetes Paternal Grandmother  
   Malig hypertherm Neg Hx  
   Kidney disease Neg Hx  
 
 REVIEW OF SYSTEMS
 
 General: The patient noted to have no problem of fever,no weight loss
  and no chills.
 
 Neurologic: Denies any headache, any lightheadedness. No loss of
 
 consciousness or seizure.
 
 Cardiac: Denies Chest Pain, Palpitation, Dyspnea on Exertion
 
 Pulmonary: Denies cough, wheezing and hemoptysis
 
 GASTROINTESTINAL: Denies nausea vomiting, and diarrhea.
 GENITOURINARY: Noted no dysuria, urgency, or frequency.
 Hematological : Denies any ecchymosis, bleeding or hematuria
 
 Endocrine: Denies any polyphagia, polyuria or hot and cold intolerance
  Musculoskeletal: no new joint pain and swelling. 
 Skin : Denies any new rashes or cutaneous changes.
 
 Rest of the review of systems is unremarkable.
 
 PHYSICAL EXAMINATION
 
 VITAL SIGNS 
 Wt Readings from Last 3 Encounters: 
 19 65.5 kg (144 lb 6.4 oz) 
 19 65.5 kg (144 lb 6.4 oz) 
 18 68.7 kg (151 lb 7.3 oz) 
 
 Temp Readings from Last 3 Encounters: 
 19 98.2 F (36.8 C) (Oral) 
 19 97.7 F (36.5 C) (Axillary) 
 19 98.3 F (36.8 C) (Oral) 
 
 BP Readings from Last 3 Encounters: 
 19 105/58 
 19 108/56 
 01/10/19 115/54 
 
 Pulse Readings from Last 3 Encounters: 
 19 56 
 19 68 
 01/10/19 59 
 
 Head:  Normocephalic atraumatic
 
 
 HEENT: Pink palpebral conjunctivae. Anicteric sclerae. No
 tonsillopharyngeal congestion.
 
 Neck : Noted no  Cervical Lymphadenopathy
 
 CHEST:Clear Breath Sounds Bilateral 
 
 HEART: Regular rate and rhythm. No murmurs thrills or rubs
 
 ABDOMEN: Soft, flat. No tenderness. No hepatosplenomegaly.
 
 GROIN AREA: Negative  for intertrigo.
 
 EXTREMITIES: upper extremity no edema
 Left foot with dressing
 Right foot with wounds less pale and red.
  
 
 NEUROLOGIC: No localizing signs.
 
 Skin : with  ecchymosis. 
 
 LABORATORY DATA
 
 Lab Results 
 Component Value Date 
  WBC 3.90 2019 
  HGB 9.8 (L) 2019 
  HCT 30.1 (L) 2019 
   (L) 2019 
  CHOL 101 2017 
  TRIG 132 2017 
  HDL 34 (L) 2017 
  ALT 21 2019 
  AST 24 2019 
   2019 
  K 3.9 2019 
  CL 97 (L) 2019 
  CREATININE 4.8 (H) 2019 
  BUN 13 2019 
  CO2 31 2019 
  TSH 1.14 2017 
  INR 1.0 2018 
  GLUF 169 (H) 2019 
  HGBA1C 7.5 (H) 2018 
 
 Hepatic Function Panel:  
 Lab Results 
 Component Value Date 
  PROT 6.2 (L) 2019 
  ALB 2.7 (L) 2019 
  BILITOT 0.4 2019 
  BILIDIR 0.1 2017 
   2019 
  AST 24 2019 
  ALT 21 2019 
  
 
 Lipase: 
 Lab Results 
 
 Component Value Date 
  LIPASE 332 2017 
  
 U/A:  
 Lab Results 
 Component Value Date 
  COLORU YELLOW 2011 
  CLARITYU Slightly Cloudy 10/16/2018 
  MEROPENEM 1.009 2013 
  LEUKOCYTESUR  10/16/2018 
    Comment: 
    small 
  NITRITE Negative 10/16/2018 
  UROBILINOGEN Normal 10/16/2018 
  UPRO  10/16/2018 
    Comment: 
    100 
  UPRO 100 2013 
  PHUR 8 10/16/2018 
  BLOODU Negative 10/16/2018 
  KETONES negative 10/16/2018 
  BILIRUBINUR Negative 10/16/2018 
  GLUCOSEU  10/16/2018 
    Comment: 
    moderate 
  
 DIAGNOSTIC IMAGING
 
 CAT scan 
 
 Ultrasound
   
 X-ray Foot Left
 
 Result Date: 2018
 Amputation of the left forefoot at the level of the proximal metatarsals. Surgical cutaneou
s staples are present. No radiographic evidence for osteomyelitis. Normal alignment of the h
indfoot. Mild degeneration of the midfoot. Electronically signed by Oleksandr Lemus MD on 2018 10:12 AM
 
 Ct Head Without Contrast
 
 Result Date: 2018
 1.  No acute intracranial pathology. Electronically signed by Steven Samano MD on  5:28 PM
 
 X-ray Chest 1 View
 
 Result Date: 2019
 1.  Small loculated pleural fluid collection seen overlying the lateral left heart border i
n the lower left chest.  There may also be a small pleural effusion at the right lung base w
hich is layering posteriorly. 2.  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 Electronically s
igned by Eugenio Hoffman DO on 2019 4:59 PM
 
 Us Lower Extremty  Arterial Right
 
 Result Date: 2019
 1. Right leg shows biphasic inflow with a greater than 50% stenosis at the origin of the dobbins
perficial femoral artery (137-309).  Biphasic outflow. 2. Two vessel runoff to the right jeanne
t. Signed by: Eugenio Hoffman Sign Date/Time: 2019 3:11 PM
 
 
 Cherie Villalobos is a 68 y.o. female with the following Problems 
 Patient Active Problem List 
 Diagnosis 
   Proteinuria 
   Vitamin D deficiency 
   Secondary hyperparathyroidism (HCC) 
   Recurrent UTI 
   Coronary artery disease involving native coronary artery of native heart without angina
 pectoris 
   Depression 
   ESRD (end stage renal disease) (HCC) 
   Type 2 diabetes mellitus with chronic kidney disease on chronic dialysis, with long-ter
m current use of insulin (HCC) 
   Anemia in ESRD (end-stage renal disease) (HCC) 
   Hypertension, essential 
   Dyslipidemia 
   Gastroesophageal reflux disease without esophagitis 
   Diabetic foot ulcer (HCC) 
   Loss of weight 
   Dysphagia, unspecified 
   Foot ulcer due to DM  (HCC) 
   Severe protein-calorie malnutrition (HCC) 
   Osteomyelitis of left foot (HCC) 
   Pleural effusion 
   Prolonged Q-T interval on ECG 
   Chronic systolic congestive heart failure (HCC) 
   Chronic diarrhea 
   Chronic anticoagulation 
   Plantar ulcer (HCC) 
   Cardiomyopathy (HCC) 
   COPD (chronic obstructive pulmonary disease) (HCC) 
   ICD (implantable cardioverter-defibrillator) in place 
   Implantable defibrillator reprogramming/check 
   Mixed hyperlipidemia 
   Moderate protein-calorie malnutrition (HCC) 
   Paroxysmal atrial fibrillation (HCC) 
   S/P CABG x 2 
   S/P mitral valve repair 
   Steroid-induced hyperglycemia 
   Stress hyperglycemia 
   Chronic multifocal osteomyelitis of left foot (HCC) 
   PVD (peripheral vascular disease) (HCC) 
   CAD (coronary artery disease) 
   Chest pain 
 
 Plan:
 
 Silver sulfadiazene cream 2x day on the wound. 
 Follow up  Blood culture 2x on different site.
 Follow up  Gram Stain and Culture
 Continue with iv ceftazidime, vancomycin for one week 
 Follow up in one week.
 Discussed with Dr. Josue hospitalist  who agreed and will proceed
 As plan.
 Thank you very much for the consult.
 ______________________
 
 ______________________
 MD Jayson FRANKLIN Christopher D, [facility] - 2019 11:00 AM PSTFormatting of this note may be different
 from the original.
 Vancomycin Monitoring
 
 S:  Pharmacy to dose vancomycin per protocol.  Diagnosis:  Cellulitis.
 O: 
 Lab Results 
 Component Value Date/Time 
  CREATININE 6.8 (H) 2019 04:53 AM 
  WBC 3.39 (L) 2019 04:53 AM 
 
  Wt= 65.5 kg, CrCl= HD T,Th,S. Tmax afeb,
  Cultures= Bloodx2 pending.  Other Abx= ceftazidime, metronidazole. 
 A:  Trough goal:  10- 20 ug/mL
 P: Continue vancomycin per protocol. No levels needed at this time. Will continue to monito
r. 
 
 Pharmacist: Prudence Alcantar MD-R2 - 2019  6:55 AM PSTFormatting of this note may be different fr
om the original.
 [facility]
 Service: Hospitalist
 Resident Progress Note
 
 Hospital Day:  LOS: 1 day 
 Consultants: Treatment Team: 
 Consulting Physician: Srinivasan Jordan DPM
 Consulting Physician: Kirt Mclaughlin MD
 Consulting Physician: Ethan Awad MD
 Admitting Provider: Luiza Rosales MD
 SUBJECTIVE
 
      Patient Summary:  Per Dr. Rosales's H and P from 2019: '69 y/o F with h/o ESRD
 on HD T,TH,Sat (Dr. Asher), DM2, PAD s/p angioplasty of LLE and transmetatarsal amputation 
of left foot 18 by Dr. Jordan due to osteomyelitis, CAD, s/p MI, CABG, AVR , HFrE
F with last EF 20 to 25%, s/p AICD, AF on chronic anticoagulation who was sent to ED by Dr. Elliott for evaluation of right LE.  Patient reports that at HD yesterday it was noted that her
 right toes were red and dusky.  She went to see Dr. Elliott this morning and he was concerned 
that right toes could be ischemic or infected and referred to ED. '
 Vascular surgery was consulted from the emergency department, they did a selective catheter
ization of the left common femoral artery and placed a stent. The patient tolerated the proc
edure well.
      Events Overnight:  
  - No acute overnight events. Afebrile, vital signs stable. The patient reports her pain is
 markedly improved, she states the color is also better to her foot. She has had no other pa
in, no shortness of breath, no nausea or vomiting. Her last bowel movement was this morning.
 Medications: Fentanyl, Norco, Ativan, Versed  2
 
 Scheduled Medications   aspirin  81 mg Oral Daily with breakfast 
   atorvastatin  40 mg Oral Nightly 
   calcium acetate  667 mg Oral TID WC 
   carvedilol  12.5 mg Oral BID WC 
   cefTAZidime  2 g Intravenous After Hemodialysis (see admin instructions) 
   cholecalciferol  5,000 Units Oral Daily 
   clopidogrel  75 mg Oral Daily 
   famotidine  10 mg Oral Daily 
   insulin glargine  15 Units Subcutaneous Nightly 
   insulin lispro (human)  0-3 Units Subcutaneous Nightly 
   insulin lispro (human)  0-6 Units Subcutaneous TID AC 
 
   isosorbide mononitrate  60 mg Oral Daily 
   metroNIDAZOLE  500 mg Oral 3 times per day 
   nortriptyline  25 mg Oral QAM 
  And 
   nortriptyline  75 mg Oral Nightly 
   oxybutynin  2.5 mg Oral BID 
   pantoprazole  40 mg Oral QAM AC 
   rOPINIRole  0.75 mg Oral Nightly 
   sodium chloride (PF)  10 mL Intravenous Q8H 
   vancomycin  500 mg Intravenous See Admin Instructions 
   Vortioxetine HBr  10 mg Oral Nightly 
 
 Continuous Infusions 
   dextrose   
 
 PRN Medications
 acetaminophen **OR** acetaminophen, albuterol, dextrose, dextrose, dextrose, fentaNYL **OR*
* fentaNYL, glucagon, glucagon, HYDROcodone-acetaminophen **OR** HYDROcodone-acetaminophen, 
HYDROmorphone **OR** HYDROmorphone, loperamide, LORazepam, nitroGLYCERIN, ondansetron **OR**
 ondansetron, zolpidem
 
 OBJECTIVE
 Vital Signs:
 /56 (BP Location: Left leg)  | Pulse 77  | Temp 98.5 F (36.9 C) (Oral)  | Resp 16
  | Ht 1.778 m (5' 10")  | Wt 65.5 kg (144 lb 6.4 oz)  | SpO2 98%  | BMI 20.72 kg/m 
 
 Physical Exam
 
 General Appearance: Alert and cooperative, sitting up in bed receiving dialysis and appears
 to be in no acute distress. 
 
 HEENNT: Normocephalic and atraumatic. Hearing grossly intact. No nasal discharge. No trache
al deviation. 
 
 Cardiovascular: Rhythm and rate are regular. 2/6 systolic murmur heard best over the left u
pper sternal border. No gallops or rubs appreciated. Peripheral pulses 1+ on the right. No l
ower extremity edema.
 
 Pulmonary/Chest: Lungs are clear to auscultation bilaterally. Effort is normal. Normal jadon
th sounds. 
 
 Abdominal: Soft, nontender, nondistended. Normoactive bowel sounds. No guarding or rebound 
tenderness. 
 
 Musculoskeletal: Normal range of motion. Normal muscular development. Patient with forefoot
 amputation on the left. Left foot wrapped in dressing, C/D/I. Right foot with erythema over
 the great big toe and cyanosis over the second toe, patient stated this is improved from pr
ior. Her right foot is warm to the touch and has palpable dorsalis pedis pulse.
 
 Neurological: CN II-XII grossly intact. Oriented to person, place, and time. 
 
 Skin: Skin is warm and dry. No rash noted. 
 
 Psychiatric:The patient was able to demonstrate good judgement and reason, without hallucin
ations, abnormal affect or abnormal behaviors during the examination. 
 
 DATA
 
 Recent Labs
 Lab 19
 
 0453 19
 1543 19
 0554 19
 0847 
 WBC 3.39* 5.53 4.08 4.23 
 HGB 9.5* 9.4* 9.7* 8.5* 
 HCT 29.2* 28.4* 29.7* 26.0* 
 * 147* 125* 134* 
 NEUTOPHILPCT 66.95  --   --  74.02 
 MONOPCT 11.96  --   --  7.65 
 
 Recent Labs
 Lab 19
 0453 19
 1543 19
 0554 19
 0847 
  139 138 139 
 K 4.2 4.0 4.4 4.4 
  100 100 99 
 CO2 28 29 28 32 
 BUN 26* 23 15 24 
 CREATININE 6.8* 6.1* 4.9* 6.5* 
 ALB  --  2.7* 2.6* 2.2* 
 PROT  --  6.2* 6.1*  --  
 BILITOT  --  0.4 0.5  --  
 ALT  --    --  
 AST  --  24   --  
 
 Phosphorus:  
 Lab Results 
 Component Value Date 
  PHOS 4.4 2019 
 
 Recent Labs
 Lab 19
 1543 
 CKTOTAL 28* 
 
 Intake/Output Summary (Last 24 hours) at 19 0655
 Last data filed at 19 0341
  Gross per 24 hour 
 Intake              300 ml 
 Output                0 ml 
 Net              300 ml 
 
 Microbiology: Blood cultures pending
 
 Radiology
 Us Lower Extremty  Arterial Right
 
 Result Date: 2019
 1. Right leg shows biphasic inflow with a greater than 50% stenosis at the origin of the dobbins
perficial femoral artery (137-309).  Biphasic outflow. 2. Two vessel runoff to the right jeanne
t. Signed by: Eugenio Hoffman Sign Date/Time: 2019 3:11 PM
 
 PROBLEM LIST
 
 Principal Problem:
   PVD (peripheral vascular disease) ([facility])
 
 Active Problems:
   ESRD (end stage renal disease) ([facility])
   Type 2 diabetes mellitus with chronic kidney disease on chronic dialysis, with long-term 
current use of insulin ([facility])
   Anemia in ESRD (end-stage renal disease) ([facility])
   Hypertension, essential
   Chronic systolic congestive heart failure ([facility])
   COPD (chronic obstructive pulmonary disease) ([facility])
   Paroxysmal atrial fibrillation ([facility])
   CAD (coronary artery disease)
 Resolved Problems:
   * No resolved hospital problems. *
 
 ASSESSMENT & PLAN
 
 Patient Active Hospital Problem List:
  PVD (peripheral vascular disease) ([facility]) (2018)
   Assessment: Sent by ASAF Torrez, to the emergency department. Dr. Mclaughlin was consulted, IR 
angiogram yesterday. She was found to have mesenteric stenosis and PAD. Stent was placed in 
the left common iliac artery. She tolerated the catheterization well, and has improved vascu
lar blood flow to the right foot, although still with some cyanosis of the second toe.
   Plan: 
 Infectious disease consulting, appreciate recommendations
 IV Ceftazidime, vancomycin, metronidazole
 Vascular surgery consulting, appreciate recommendations
 Restarted apixaban (eliquis) today
 Fentanyl, Tylenol, Norco available prn for pain control
 Wound care consulted, appreciate recommendations
 Podiatry has also been consulted
 
  ESRD (end stage renal disease) ([facility]) (2016)
    Anemia in ESRD (end-stage renal disease) ([facility]) (2016)
 Assessment: With dialysis Tuesday, Thursday, Saturday, sees Dr. Asher clinic. She received 
her regular dialysis today without issue.
   Plan: 
 Nephrology consulting, appreciate recommendations.
 Continue home PhosLo, vitamin D
 She receives EPO injections with her dialysis
 
  Type 2 diabetes mellitus with chronic kidney disease on chronic dialysis, with long-term c
urrent use of insulin ([facility]) (2016)
   Assessment: The patient reports she normally takes 21 units of long-acting insulin at nig
ht with an insulin sliding scale at meals. Last hemoglobin A1c 7.5 percent in 2018.
 
   Plan: Lantus 15 units nightly
 Mild insulin sliding scale
 Aspirin, statin
 
  Hypertension, essential (2016)
   Assessment: Patient's blood pressure has been normal, and on the side of low during dialy
sis but with systolic blood pressure greater than 90.
   Plan: 
 Continue home Coreg, Imdur 
 
  Chronic systolic congestive heart failure ([facility]) (2018)
   Assessment: Last echo done 2018 showed severely impaired EF 20-25 percent, mild 
aortic regurgitation, moderate aortic stenosis, mild to moderate mitral regurgitation with m
oderate pulmonary hypertension. 
   Plan:
 Continue home Aspirin, statin, Coreg, Imdur
 
 
  COPD
 Assessment: The patient currently states that this is stable, she is currently satting well
 on room air with no shortness of breath.
 Plan: Albuterol prn
 
  CAD (coronary artery disease) (2018)
   Assessment: She has a history of chest pain with dialysis, however this has not happened 
during this hospitalization. Last catheterization 2018, CABG in 2018. 
   Plan: 
 Continue home Aspirin, statin, Imdur
 
 Continue home for Vortioxetine, nortriptyline, oxybutynin, pantoprazole
 Currently holding losartan
 
 Diet: Diabetic 
 DVT Prophylaxis: Eliquis
 Disposition: Inpatient
 Code Status:  Full Code
 
 Prudence Nicholson MD-R2
 2019
 6:55 AM
 
 
 Associated attestation - Moiz Josue MD - 2019  5:13 PM PSTPatient seen an
d examined along with residents. All labs and notes personally reviewed by me. Patient under
went vascular procedure yesterday with angioplasty and stenting of right SFA. She now has wa
rm right foot indicating resumption of circulation. Patient is on three antibiotics (vanc, F
lagyl and ceftazidime) by Dr. Elliott for right first and second toe cellulitis. No surgical in
tervention needed at this stage for right foot infection. Appreciate help of Dr. Mclaughlin, Dr. Norma clemente and Dr. Jordan. 
 I agree with the progress note of Dr. Nicholson. Abigail Morgan, [facility] - 2019  8:10 PM 
PSTFormatting of this note may be different from the original.
 Clinical Pharmacy Note: Renal Monitoring
 Cherie Villalobos 68 y.o. female
 Ht Readings from Last 1 Encounters: 
 19 1.778 m (5' 10") 
   
 Wt Readings from Last 1 Encounters: 
 19 65.5 kg (144 lb 6.4 oz) 
  
 Serum creatinine: 6.1 mg/dL (H) 19 1543
 Estimated creatinine clearance: 9 mL/min (A)
 Patient has h/o ESRD on HD qTTS
 
 Pharmacy dosing for renal function per Dr. Luiza Rosales.
 
 Currently, there are no medications needing to be adjusted.
 
 Pharmacy will continue to monitor and make changes as needed per renal function. 
 
 Abigail Morgan, [facility]
 PGY-1 Pharmacy Resident
 in this encounter
 
 Plan of Treatment
 
 
+--------+----------+-----------------+----------------------+-------------+
 
| Date   | Type     | Specialty       | Care Team            | Description |
+--------+----------+-----------------+----------------------+-------------+
| / | Initial  | General Surgery |   Сергей Medeiros, |             |
|    | consult  |                 |  MD [address]  |             |
|        |          |                 |  [place] [address]  |             |
|        |          |                 |  [phone]        |             |
|        |          |                 | [phone] (Fax)   |             |
+--------+----------+-----------------+----------------------+-------------+
 as of this encounter
 
 Procedures
 
 
+----------------------+--------+-------------+----------------------+----------------------
+
| Procedure Name       | Priori | Date/Time   | Associated Diagnosis | Comments             
|
|                      | ty     |             |                      |                      
|
+----------------------+--------+-------------+----------------------+----------------------
+
| POCT GLUCOSE         | Routin | 2019  |                      |   Results for this   
|
|                      | e      | 12:10 PM    |                      | procedure are in the 
|
|                      |        | PST         |                      |  results section.    
|
+----------------------+--------+-------------+----------------------+----------------------
+
| POCT GLUCOSE         | Routin | 2019  |                      |   Results for this   
|
|                      | e      |  5:55 AM    |                      | procedure are in the 
|
|                      |        | PST         |                      |  results section.    
|
+----------------------+--------+-------------+----------------------+----------------------
+
| CBC W/AUTO DIFF      | Routin | 2019  |                      |   Results for this   
|
| (REFLEX TO MANUAL)   | e      |  5:38 AM    |                      | procedure are in the 
|
|                      |        | PST         |                      |  results section.    
|
+----------------------+--------+-------------+----------------------+----------------------
+
| PHOSPHOROUS          | Routin | 2019  |                      |   Results for this   
|
|                      | e      |  5:38 AM    |                      | procedure are in the 
|
|                      |        | PST         |                      |  results section.    
|
+----------------------+--------+-------------+----------------------+----------------------
+
| MAGNESIUM            | Routin | 2019  |                      |   Results for this   
|
|                      | e      |  5:38 AM    |                      | procedure are in the 
|
|                      |        | PST         |                      |  results section.    
|
+----------------------+--------+-------------+----------------------+----------------------
 
+
| BASIC METABOLIC      | Routin | 2019  |                      |   Results for this   
|
| PANEL                | e      |  5:38 AM    |                      | procedure are in the 
|
|                      |        | PST         |                      |  results section.    
|
+----------------------+--------+-------------+----------------------+----------------------
+
| POCT GLUCOSE         | Routin | 2019  |                      |   Results for this   
|
|                      | e      |  9:29 PM    |                      | procedure are in the 
|
|                      |        | PST         |                      |  results section.    
|
+----------------------+--------+-------------+----------------------+----------------------
+
| POCT GLUCOSE         | Routin | 2019  |                      |   Results for this   
|
|                      | e      |  4:06 PM    |                      | procedure are in the 
|
|                      |        | PST         |                      |  results section.    
|
+----------------------+--------+-------------+----------------------+----------------------
+
| POCT GLUCOSE         | Routin | 2019  |                      |   Results for this   
|
|                      | e      | 12:13 PM    |                      | procedure are in the 
|
|                      |        | PST         |                      |  results section.    
|
+----------------------+--------+-------------+----------------------+----------------------
+
| EKG STANDARD 12 LEAD | Routin | 2019  |                      |   Results for this   
|
|                      | e      |  6:18 AM    |                      | procedure are in the 
|
|                      |        | PST         |                      |  results section.    
|
+----------------------+--------+-------------+----------------------+----------------------
+
| POCT GLUCOSE         | Routin | 2019  |                      |   Results for this   
|
|                      | e      |  5:21 AM    |                      | procedure are in the 
|
|                      |        | PST         |                      |  results section.    
|
+----------------------+--------+-------------+----------------------+----------------------
+
| CBC W/AUTO DIFF      | Routin | 2019  |                      |   Results for this   
|
| (REFLEX TO MANUAL)   | e - AM |  4:53 AM    |                      | procedure are in the 
|
|                      |        | PST         |                      |  results section.    
|
+----------------------+--------+-------------+----------------------+----------------------
+
| BASIC METABOLIC      | Routin | 2019  |                      |   Results for this   
|
| PANEL                | e - AM |  4:53 AM    |                      | procedure are in the 
 
|
|                      |        | PST         |                      |  results section.    
|
+----------------------+--------+-------------+----------------------+----------------------
+
| POCT GLUCOSE         | Routin | 2019  |                      |   Results for this   
|
|                      | e      |  9:00 PM    |                      | procedure are in the 
|
|                      |        | PST         |                      |  results section.    
|
+----------------------+--------+-------------+----------------------+----------------------
+
| IR STENT FEMORAL     | Routin | 2019  |                      |   Results for this   
|
| POPLITEAL            | e      |  6:45 PM    |                      | procedure are in the 
|
|                      |        | PST         |                      |  results section.    
|
+----------------------+--------+-------------+----------------------+----------------------
+
| IR ANGIOGRAM         | Routin | 2019  |                      |   Results for this   
|
| EXTREMITY RIGHT      | e      |  6:45 PM    |                      | procedure are in the 
|
|                      |        | PST         |                      |  results section.    
|
+----------------------+--------+-------------+----------------------+----------------------
+
| IR AORTAGRAM         | Routin | 2019  |                      |   Results for this   
|
| ABDOMINAL            | e      |  6:45 PM    |                      | procedure are in the 
|
| SERIALOGRAM          |        | PST         |                      |  results section.    
|
+----------------------+--------+-------------+----------------------+----------------------
+
| BLOOD CULTURE, SET 2 | Timed  | 2019  |                      |   Results for this   
|
|                      |        |  6:05 PM    |                      | procedure are in the 
|
|                      |        | PST         |                      |  results section.    
|
+----------------------+--------+-------------+----------------------+----------------------
+
| IR GUIDANCE VASCULAR | Routin | 2019  |                      |   Results for this   
|
|  ACCESS US           | e      |  5:40 PM    |                      | procedure are in the 
|
|                      |        | PST         |                      |  results section.    
|
+----------------------+--------+-------------+----------------------+----------------------
+
| BLOOD CULTURE, SET 1 | Timed  | 2019  |                      |   Results for this   
|
|                      |        |  3:43 PM    |                      | procedure are in the 
|
|                      |        | PST         |                      |  results section.    
|
+----------------------+--------+-------------+----------------------+----------------------
 
+
| SEDIMENTATION RATE,  | STAT   | 2019  |                      |   Results for this   
|
| AUTOMATED            |        |  3:43 PM    |                      | procedure are in the 
|
|                      |        | PST         |                      |  results section.    
|
+----------------------+--------+-------------+----------------------+----------------------
+
| CBC W/MANUAL DIFF    | STAT   | 2019  |                      |   Results for this   
|
|                      |        |  3:43 PM    |                      | procedure are in the 
|
|                      |        | PST         |                      |  results section.    
|
+----------------------+--------+-------------+----------------------+----------------------
+
| C-REACTIVE PROTEIN   | STAT   | 2019  |                      |   Results for this   
|
|                      |        |  3:43 PM    |                      | procedure are in the 
|
|                      |        | PST         |                      |  results section.    
|
+----------------------+--------+-------------+----------------------+----------------------
+
| CK                   | STAT   | 2019  |                      |   Results for this   
|
|                      |        |  3:43 PM    |                      | procedure are in the 
|
|                      |        | PST         |                      |  results section.    
|
+----------------------+--------+-------------+----------------------+----------------------
+
| COMPREHENSIVE        | STAT   | 2019  |                      |   Results for this   
|
| METABOLIC PANEL      |        |  3:43 PM    |                      | procedure are in the 
|
|                      |        | PST         |                      |  results section.    
|
+----------------------+--------+-------------+----------------------+----------------------
+
| US LOWER EXTREMITY   | STAT   | 2019  |                      |   Results for this   
|
| ARTERIAL RIGHT       |        |  2:23 PM    |                      | procedure are in the 
|
|                      |        | PST         |                      |  results section.    
|
+----------------------+--------+-------------+----------------------+----------------------
+
| ED INFORMATION       | Routin | 2019  |                      |   Results for this   
|
| EXCHANGE             | e      |  1:03 PM    |                      | procedure are in the 
|
|                      |        | PST         |                      |  results section.    
|
+----------------------+--------+-------------+----------------------+----------------------
+
 in this encounter
 
 Results
 
 POCT glucose (2019 12:10 PM)
 
+--------------------+--------------------------+---------------+-----------------+
| Component          | Value                    | Ref Range     | Performed At    |
+--------------------+--------------------------+---------------+-----------------+
| GLUCOSE,POC SCREEN | 237 (H)Comment: Testing  | 65 - 99 mg/dL | [facility] |
|                    | performed at [facility];[address]     |               |                 |
|                    | [address];[place], [place]   |               |                 |
|                    | [address]                    |               |                 |
+--------------------+--------------------------+---------------+-----------------+
 
 
 
+-------------------+------------------+--------------------+--------------+
| Performing        | Address          | City/State/Zipcode | Phone Number |
| Organization      |                  |                    |              |
+-------------------+------------------+--------------------+--------------+
|   [facility] |   [address] | [place] [address] |              |
+-------------------+------------------+--------------------+--------------+
 POCT glucose (2019  5:55 AM)
 
+--------------------+--------------------------+---------------+-----------------+
| Component          | Value                    | Ref Range     | Performed At    |
+--------------------+--------------------------+---------------+-----------------+
| GLUCOSE,POC SCREEN | 135 (H)Comment: Testing  | 65 - 99 mg/dL | [facility] |
|                    | performed at [facility];[address]     |               |                 |
|                    | [address];[place]   |               |                 |
|                    | [address]                    |               |                 |
+--------------------+--------------------------+---------------+-----------------+
 
 
 
+-------------------+------------------+--------------------+--------------+
| Performing        | Address          | City/State/Zipcode | Phone Number |
| Organization      |                  |                    |              |
+-------------------+------------------+--------------------+--------------+
|   [facility] |   [address] | [place] [address] |              |
+-------------------+------------------+--------------------+--------------+
 Phosphorus (2019  5:38 AM)
 
+------------+--------------------------+-----------------+--------------+
| Component  | Value                    | Ref Range       | Performed At |
+------------+--------------------------+-----------------+--------------+
| PHOSPHORUS | 3.6Comment: Testing      | 2.3 - 4.8 mg/dL | [facility]   |
|            | performed at [facility], [address] |                 | LABORATORY   |
|            |  [address],        |                 |              |
|            | [place]  [address]     |                 |              |
+------------+--------------------------+-----------------+--------------+
 
 
 
+----------+
| Specimen |
+----------+
| Blood    |
+----------+
 
 
 
+---------------+-------------------------+---------------------+----------------+
 
| Performing    | Address                 | City/State/Zipcode  | Phone Number   |
| Organization  |                         |                     |                |
+---------------+-------------------------+---------------------+----------------+
|   [facility]  |   [address]  | [place] [address] |   [phone] |
| LABORATORY    | [address]                   |                     |                |
+---------------+-------------------------+---------------------+----------------+
 Magnesium (2019  5:38 AM)
 
+-----------+--------------------------+-----------------+--------------+
| Component | Value                    | Ref Range       | Performed At |
+-----------+--------------------------+-----------------+--------------+
| MAGNESIUM | 2.3Comment: Testing      | 1.7 - 2.4 mg/dL | [facility]   |
|           | performed at [facility], [address] |                 | LABORATORY   |
|           |  [facility],        |                 |              |
|           | [place]  [address]     |                 |              |
+-----------+--------------------------+-----------------+--------------+
 
 
 
+----------+
| Specimen |
+----------+
| Blood    |
+----------+
 
 
 
+---------------+-------------------------+---------------------+----------------+
| Performing    | Address                 | City/State/Zipcode  | Phone Number   |
| Organization  |                         |                     |                |
+---------------+-------------------------+---------------------+----------------+
|   [facility]  |   [address]  | [place] [address] |   [phone] |
| LABORATORY    | [address]                   |                     |                |
+---------------+-------------------------+---------------------+----------------+
 Basic metabolic panel (2019  5:38 AM)
 
+----------------+--------------------------+-------------------+--------------+
| Component      | Value                    | Ref Range         | Performed At |
+----------------+--------------------------+-------------------+--------------+
| SODIUM         | 139                      | 135 - 145 mmol/L  | [facility]   |
|                |                          |                   | LABORATORY   |
+----------------+--------------------------+-------------------+--------------+
| POTASSIUM      | 3.9                      | 3.5 - 4.9 mmol/L  | [facility]   |
|                |                          |                   | LABORATORY   |
+----------------+--------------------------+-------------------+--------------+
| CHLORIDE       | 97 (L)                   | 99 - 109 mmol/L   | [facility]   |
|                |                          |                   | LABORATORY   |
+----------------+--------------------------+-------------------+--------------+
| CO2            | 31                       | 23 - 32 mmol/L    | [facility]   |
|                |                          |                   | LABORATORY   |
+----------------+--------------------------+-------------------+--------------+
| ANION GAP AGAP | 15                       | 5 - 20 mmol/L     | [facility]   |
|                |                          |                   | LABORATORY   |
+----------------+--------------------------+-------------------+--------------+
| GLUCOSE        | 169 (H)                  | 65 - 99 mg/dL     | [facility]   |
|                |                          |                   | LABORATORY   |
+----------------+--------------------------+-------------------+--------------+
| BUN            | 13                       | 8 - 25 mg/dL      | [facility]   |
|                |                          |                   | LABORATORY   |
+----------------+--------------------------+-------------------+--------------+
 
| CREATININE     | 4.8 (H)                  | 0.50 - 1.00 mg/dL | [facility]   |
|                |                          |                   | LABORATORY   |
+----------------+--------------------------+-------------------+--------------+
| BUN/CREAT      | 3                        |                   | [facility]   |
|                |                          |                   | LABORATORY   |
+----------------+--------------------------+-------------------+--------------+
| CALCIUM        | 9.4                      | 8.5 - 10.5 mg/dL  | [facility]   |
|                |                          |                   | LABORATORY   |
+----------------+--------------------------+-------------------+--------------+
| EGFR           | 9 (L)Comment: GFR <60:   | >60 mL/min/1.73m2 | [facility]   |
|                | CHRONIC KIDNEY DISEASE,  |                   | LABORATORY   |
|                | IF FOUND OVER A 3 MONTH  |                   |              |
|                | PERIOD.GFR <15: KIDNEY   |                   |              |
|                | FAILURE.FOR       |                   |              |
|                | [employer], MULTIPLY THE  |                   |              |
|                | CALCULATED GFR BY        |                   |              |
|                | 1.210.This eGFR is       |                   |              |
|                | calculated using the     |                   |              |
|                | MDRD IDMS traceable      |                   |              |
|                | equation.Testing         |                   |              |
|                | performed at [facility], [address] |                   |              |
|                |  [facility],        |                   |              |
|                | [place]    [address]   |                   |              |
+----------------+--------------------------+-------------------+--------------+
 
 
 
+----------+
| Specimen |
+----------+
| Blood    |
+----------+
 
 
 
+---------------+-------------------------+---------------------+----------------+
| Performing    | Address                 | City/State/Zipcode  | Phone Number   |
| Organization  |                         |                     |                |
+---------------+-------------------------+---------------------+----------------+
|   [facility]  |   [address]  | [place] [address] |   [phone] |
| LABORATORY    | [address]                   |                     |                |
+---------------+-------------------------+---------------------+----------------+
 CBC w/auto diff (reflex to manual) (2019  5:38 AM)
 
+----------------------+--------------------------------------------------------------------
-----+-------------------+--------------+
| Component            | Value                                                              
     | Ref Range         | Performed At |
+----------------------+--------------------------------------------------------------------
-----+-------------------+--------------+
| WBC                  | 3.90                                                               
     | 3.80 - 11.00 K/uL | [facility]   |
|                      |                                                                    
     |                   | LABORATORY   |
+----------------------+--------------------------------------------------------------------
-----+-------------------+--------------+
| RBC                  | 2.87 (L)                                                           
     | 3.70 - 5.10 M/uL  | [facility]   |
|                      |                                                                    
     |                   | LABORATORY   |
 
+----------------------+--------------------------------------------------------------------
-----+-------------------+--------------+
| HGB                  | 9.8 (L)                                                            
     | 11.3 - 15.5 g/dL  | [facility]   |
|                      |                                                                    
     |                   | LABORATORY   |
+----------------------+--------------------------------------------------------------------
-----+-------------------+--------------+
| HCT                  | 30.1 (L)                                                           
     | 34.0 - 46.0 %     | [facility]   |
|                      |                                                                    
     |                   | LABORATORY   |
+----------------------+--------------------------------------------------------------------
-----+-------------------+--------------+
| MCV                  | 105.1 (H)                                                          
     | 80.0 - 100.0 fl   | [facility]   |
|                      |                                                                    
     |                   | LABORATORY   |
+----------------------+--------------------------------------------------------------------
-----+-------------------+--------------+
| MCH                  | 34.3 (H)                                                           
     | 27.0 - 34.0 pg    | [facility]   |
|                      |                                                                    
     |                   | LABORATORY   |
+----------------------+--------------------------------------------------------------------
-----+-------------------+--------------+
| MCHC                 | 32.7                                                               
     | 32.0 - 35.5 g/dL  | [facility]   |
|                      |                                                                    
     |                   | LABORATORY   |
+----------------------+--------------------------------------------------------------------
-----+-------------------+--------------+
| RDW SD               | 61.7 (H)                                                           
     | 37 - 53 fl        | [facility]   |
|                      |                                                                    
     |                   | LABORATORY   |
+----------------------+--------------------------------------------------------------------
-----+-------------------+--------------+
| PLT                  | 137 (L)                                                            
     | 150 - 400 K/uL    | [facility]   |
|                      |                                                                    
     |                   | LABORATORY   |
+----------------------+--------------------------------------------------------------------
-----+-------------------+--------------+
| MPV                  | 9.5                                                                
     | fl                | [facility]   |
|                      |                                                                    
     |                   | LABORATORY   |
+----------------------+--------------------------------------------------------------------
-----+-------------------+--------------+
| DIFF TYPE            | MANUAL                                                             
     |                   | [facility]   |
|                      |                                                                    
     |                   | LABORATORY   |
+----------------------+--------------------------------------------------------------------
-----+-------------------+--------------+
| Neutrophils Manual   | 63                                                                 
     | %                 | [facility]   |
|                      |                                                                    
     |                   | LABORATORY   |
 
+----------------------+--------------------------------------------------------------------
-----+-------------------+--------------+
| Lymphocytes Manual   | 30                                                                 
     | %                 | [facility]   |
|                      |                                                                    
     |                   | LABORATORY   |
+----------------------+--------------------------------------------------------------------
-----+-------------------+--------------+
| Monocytes Manual     | 7                                                                  
     | %                 | [facility]   |
|                      |                                                                    
     |                   | LABORATORY   |
+----------------------+--------------------------------------------------------------------
-----+-------------------+--------------+
| Neutrophils Absolute | 2.46                                                               
     | 1.90 - 7.40 K/uL  | [facility]   |
|                      |                                                                    
     |                   | LABORATORY   |
+----------------------+--------------------------------------------------------------------
-----+-------------------+--------------+
| Lymphocytes Absolute | 1.17                                                               
     | 1.00 - 3.90 K/uL  | [facility]   |
|                      |                                                                    
     |                   | LABORATORY   |
+----------------------+--------------------------------------------------------------------
-----+-------------------+--------------+
| Monocytes Absolute   | 0.27                                                               
     | 0.00 - 0.80 K/uL  | [facility]   |
|                      |                                                                    
     |                   | LABORATORY   |
+----------------------+--------------------------------------------------------------------
-----+-------------------+--------------+
| MORPHOLOGY           | 1+Comment:                                                         
     |                   | [facility]   |
|                      | ANISO1+HYPONORMAL PLT                                              
     |                   | LABORATORY   |
|                      | MORPHTesting performed                                             
     |                   |              |
|                      | at [facility], [address]                                                     
     |                   |              |
|                      | [address],                                                   
     |                   |              |
|                      | [place]    [address]                                             
     |                   |              |
|                      |Testing performed at [facility], [address] [place], [place]    [id number] |                   |              |
|                      |                                                                    
     |                   |              |
+----------------------+--------------------------------------------------------------------
-----+-------------------+--------------+
 
 
 
+----------+
| Specimen |
+----------+
| Blood    |
+----------+
 
 
 
 
+---------------+-------------------------+---------------------+----------------+
| Performing    | Address                 | City/State/Zipcode  | Phone Number   |
| Organization  |                         |                     |                |
+---------------+-------------------------+---------------------+----------------+
|   [facility]  |   [address]  | [place] [address] |   [phone] |
| LABORATORY    | [address]                   |                     |                |
+---------------+-------------------------+---------------------+----------------+
 POCT glucose (2019  9:29 PM)
 
+--------------------+--------------------------+---------------+-----------------+
| Component          | Value                    | Ref Range     | Performed At    |
+--------------------+--------------------------+---------------+-----------------+
| GLUCOSE,POC SCREEN | 262 (H)Comment: Testing  | 65 - 99 mg/dL | [facility] |
|                    | performed at [facility];[address]     |               |                 |
|                    | [address];[place]   |               |                 |
|                    | [address]                    |               |                 |
+--------------------+--------------------------+---------------+-----------------+
 
 
 
+-------------------+------------------+--------------------+--------------+
| Performing        | Address          | City/State/Zipcode | Phone Number |
| Organization      |                  |                    |              |
+-------------------+------------------+--------------------+--------------+
|   [facility] |   [address] | [facility] [place] [address] |              |
+-------------------+------------------+--------------------+--------------+
 POCT glucose (2019  4:06 PM)
 
+--------------------+--------------------------+---------------+-----------------+
| Component          | Value                    | Ref Range     | Performed At    |
+--------------------+--------------------------+---------------+-----------------+
| GLUCOSE,POC SCREEN | 193 (H)Comment: Testing  | 65 - 99 mg/dL | [facility] |
|                    | performed at [facility];[address]     |               |                 |
|                    | [address];[place]   |               |                 |
|                    | [address]                    |               |                 |
+--------------------+--------------------------+---------------+-----------------+
 
 
 
+-------------------+------------------+--------------------+--------------+
| Performing        | Address          | City/State/Zipcode | Phone Number |
| Organization      |                  |                    |              |
+-------------------+------------------+--------------------+--------------+
|   [facility] |   [address] | [place] [address] |              |
+-------------------+------------------+--------------------+--------------+
 POCT glucose (2019 12:13 PM)
 
+--------------------+--------------------------+---------------+-----------------+
| Component          | Value                    | Ref Range     | Performed At    |
+--------------------+--------------------------+---------------+-----------------+
| GLUCOSE,POC SCREEN | 145 (H)Comment: Testing  | 65 - 99 mg/dL | [facility] |
|                    | performed at [facility];[address]     |               |                 |
|                    | [address];[place]   |               |                 |
|                    | [address]                    |               |                 |
+--------------------+--------------------------+---------------+-----------------+
 
 
 
+-------------------+------------------+--------------------+--------------+
 
| Performing        | Address          | City/State/Zipcode | Phone Number |
| Organization      |                  |                    |              |
+-------------------+------------------+--------------------+--------------+
|   [facility] |   [address] | [place] [address] |              |
+-------------------+------------------+--------------------+--------------+
 EKG STANDARD 12 LEAD (2019  6:18 AM)
 
+-------------------+--------------------------+-----------+--------------+
| Component         | Value                    | Ref Range | Performed At |
+-------------------+--------------------------+-----------+--------------+
| Ventricular Rate  | 73                       | BPM       | [facility]     |
+-------------------+--------------------------+-----------+--------------+
| Atrial Rate       | 73                       | BPM       | [facility]     |
+-------------------+--------------------------+-----------+--------------+
| P-R Interval      | 146                      | ms        | [facility]     |
+-------------------+--------------------------+-----------+--------------+
| QRS Duration      | 128                      | ms        | [facility]     |
+-------------------+--------------------------+-----------+--------------+
| Q-T Interval      | 464                      | ms        | [facility]     |
+-------------------+--------------------------+-----------+--------------+
| QTC Calculation   | 511                      | ms        | [facility]     |
| (Bezet)           |                          |           |              |
+-------------------+--------------------------+-----------+--------------+
| Calculated P Axis | 70                       | degrees   | [facility]     |
+-------------------+--------------------------+-----------+--------------+
| Calculated R Axis | -38                      | degrees   | [facility]     |
+-------------------+--------------------------+-----------+--------------+
| Calculated T Axis | 9                        | degrees   | [facility]     |
+-------------------+--------------------------+-----------+--------------+
| Diagnosis         | Normal sinus             |           | [facility]     |
|                   | rhythmPossible Left      |           |              |
|                   | atrial enlargementLeft   |           |              |
|                   | axis                     |           |              |
|                   | deviationNon-specific    |           |              |
|                   | intra-ventricular        |           |              |
|                   | conduction blockCannot   |           |              |
|                   | rule out Septal infarct  |           |              |
|                   | , age                    |           |              |
|                   | undeterminedAbnormal     |           |              |
|                   | ECGWhen compared with    |           |              |
|                   | ECG of 2019       |           |              |
|                   | 05:50,Minimal criteria   |           |              |
|                   | for Septal infarct are   |           |              |
|                   | now PresentConfirmed by  |           |              |
|                   | EN FRANK ([id number]) on   |           |              |
|                   | 2019 12:03:10 PM    |           |              |
+-------------------+--------------------------+-----------+--------------+
 
 
 
+--------------+-------------------+--------------------+--------------+
| Performing   | Address           | City/State/[facility] | Phone Number |
| Organization |                   |                    |              |
+--------------+-------------------+--------------------+--------------+
|   [facility]   |   [address] | [place] [address] |              |
+--------------+-------------------+--------------------+--------------+
 POCT glucose (2019  5:21 AM)
 
+--------------------+--------------------------+---------------+-----------------+
| Component          | Value                    | Ref Range     | Performed At    |
 
+--------------------+--------------------------+---------------+-----------------+
| GLUCOSE,POC SCREEN | 142 (H)Comment: Testing  | 65 - 99 mg/dL | [facility] |
|                    | performed at [facility];[address]     |               |                 |
|                    | [address] [facility];[place], [place]   |               |                 |
|                    | [address]                    |               |                 |
+--------------------+--------------------------+---------------+-----------------+
 
 
 
+-------------------+------------------+--------------------+--------------+
| Performing        | Address          | City/State/Zipcode | Phone Number |
| Organization      |                  |                    |              |
+-------------------+------------------+--------------------+--------------+
|   [facility] |   [address] | [place] [address] |              |
+-------------------+------------------+--------------------+--------------+
 Basic metabolic panel (2019  4:53 AM)
 
+----------------+--------------------------+-------------------+--------------+
| Component      | Value                    | Ref Range         | Performed At |
+----------------+--------------------------+-------------------+--------------+
| SODIUM         | 140                      | 135 - 145 mmol/L  | [facility]   |
|                |                          |                   | LABORATORY   |
+----------------+--------------------------+-------------------+--------------+
| POTASSIUM      | 4.2                      | 3.5 - 4.9 mmol/L  | [facility]   |
|                |                          |                   | LABORATORY   |
+----------------+--------------------------+-------------------+--------------+
| CHLORIDE       | 101                      | 99 - 109 mmol/L   | [facility]   |
|                |                          |                   | LABORATORY   |
+----------------+--------------------------+-------------------+--------------+
| CO2            | 28                       | 23 - 32 mmol/L    | [facility]   |
|                |                          |                   | LABORATORY   |
+----------------+--------------------------+-------------------+--------------+
| ANION GAP AGAP | 15                       | 5 - 20 mmol/L     | [facility]   |
|                |                          |                   | LABORATORY   |
+----------------+--------------------------+-------------------+--------------+
| GLUCOSE        | 157 (H)                  | 65 - 99 mg/dL     | [facility]   |
|                |                          |                   | LABORATORY   |
+----------------+--------------------------+-------------------+--------------+
| BUN            | 26 (H)                   | 8 - 25 mg/dL      | [facility]   |
|                |                          |                   | LABORATORY   |
+----------------+--------------------------+-------------------+--------------+
| CREATININE     | 6.8 (H)                  | 0.50 - 1.00 mg/dL | [facility]   |
|                |                          |                   | LABORATORY   |
+----------------+--------------------------+-------------------+--------------+
| BUN/CREAT      | 4                        |                   | [facility]   |
|                |                          |                   | LABORATORY   |
+----------------+--------------------------+-------------------+--------------+
| CALCIUM        | 9.3                      | 8.5 - 10.5 mg/dL  | [facility]   |
|                |                          |                   | LABORATORY   |
+----------------+--------------------------+-------------------+--------------+
| EGFR           | 6 (L)Comment: GFR <60:   | >60 mL/min/1.73m2 | [facility]   |
|                | CHRONIC KIDNEY DISEASE,  |                   | LABORATORY   |
|                | IF FOUND OVER A 3 MONTH  |                   |              |
|                | PERIOD.GFR <15: KIDNEY   |                   |              |
|                | FAILURE.FOR       |                   |              |
|                | [employer], MULTIPLY THE  |                   |              |
|                | CALCULATED GFR BY        |                   |              |
|                | 1.210.This eGFR is       |                   |              |
|                | calculated using the     |                   |              |
|                | MDRD IDMS traceable      |                   |              |
 
|                | equation.Testing         |                   |              |
|                | performed at [facility], [address] |                   |              |
|                |  [facility],        |                   |              |
|                | [place]    [address]   |                   |              |
+----------------+--------------------------+-------------------+--------------+
 
 
 
+----------+
| Specimen |
+----------+
| Blood    |
+----------+
 
 
 
+---------------+-------------------------+---------------------+----------------+
| Performing    | Address                 | City/State/Zipcode  | Phone Number   |
| Organization  |                         |                     |                |
+---------------+-------------------------+---------------------+----------------+
|   [facility]  |   [address]  | [place] [address] |   [phone] |
| LABORATORY    | [address]                   |                     |                |
+---------------+-------------------------+---------------------+----------------+
 CBC w/auto diff (reflex to manual) (2019  4:53 AM)
 
+-----------------+--------------------------+-------------------+--------------+
| Component       | Value                    | Ref Range         | Performed At |
+-----------------+--------------------------+-------------------+--------------+
| WBC             | 3.39 (L)                 | 3.80 - 11.00 K/uL | [facility]   |
|                 |                          |                   | LABORATORY   |
+-----------------+--------------------------+-------------------+--------------+
| RBC             | 2.78 (L)                 | 3.70 - 5.10 M/uL  | [facility]   |
|                 |                          |                   | LABORATORY   |
+-----------------+--------------------------+-------------------+--------------+
| HGB             | 9.5 (L)                  | 11.3 - 15.5 g/dL  | [facility]   |
|                 |                          |                   | LABORATORY   |
+-----------------+--------------------------+-------------------+--------------+
| HCT             | 29.2 (L)                 | 34.0 - 46.0 %     | [facility]   |
|                 |                          |                   | LABORATORY   |
+-----------------+--------------------------+-------------------+--------------+
| MCV             | 104.7 (H)                | 80.0 - 100.0 fl   | [facility]   |
|                 |                          |                   | LABORATORY   |
+-----------------+--------------------------+-------------------+--------------+
| MCH             | 34.0                     | 27.0 - 34.0 pg    | [facility]   |
|                 |                          |                   | LABORATORY   |
+-----------------+--------------------------+-------------------+--------------+
| MCHC            | 32.5                     | 32.0 - 35.5 g/dL  | [facility]   |
|                 |                          |                   | LABORATORY   |
+-----------------+--------------------------+-------------------+--------------+
| RDW SD          | 63.0 (H)                 | 37 - 53 fl        | [facility]   |
|                 |                          |                   | LABORATORY   |
+-----------------+--------------------------+-------------------+--------------+
| PLT             | 125 (L)                  | 150 - 400 K/uL    | [facility]   |
|                 |                          |                   | LABORATORY   |
+-----------------+--------------------------+-------------------+--------------+
| MPV             | 9.4                      | fl                | [facility]   |
|                 |                          |                   | LABORATORY   |
+-----------------+--------------------------+-------------------+--------------+
| DIFF TYPE       | AUTOMATED                |                   | [facility]   |
|                 |                          |                   | LABORATORY   |
 
+-----------------+--------------------------+-------------------+--------------+
| NEUTROPHILS     | 66.95                    | %                 | [facility]   |
|                 |                          |                   | LABORATORY   |
+-----------------+--------------------------+-------------------+--------------+
| LYMPHOCYTES     | 20.34                    | %                 | [facility]   |
|                 |                          |                   | LABORATORY   |
+-----------------+--------------------------+-------------------+--------------+
| MONOCYTES       | 11.96                    | %                 | [facility]   |
|                 |                          |                   | LABORATORY   |
+-----------------+--------------------------+-------------------+--------------+
| EOSINOPHILS     | 0.04                     | %                 | [facility]   |
|                 |                          |                   | LABORATORY   |
+-----------------+--------------------------+-------------------+--------------+
| BASOPHILS       | 0.71                     | %                 | [facility]   |
|                 |                          |                   | LABORATORY   |
+-----------------+--------------------------+-------------------+--------------+
| NEUTROPHILS ABS | 2.27                     | 1.90 - 7.40 K/uL  | [facility]   |
|                 |                          |                   | LABORATORY   |
+-----------------+--------------------------+-------------------+--------------+
| LYMPHOCYTES ABS | 0.69 (L)                 | 1.00 - 3.90 K/uL  | [facility]   |
|                 |                          |                   | LABORATORY   |
+-----------------+--------------------------+-------------------+--------------+
| MONOCYTES ABS   | 0.41                     | 0.00 - 0.80 K/uL  | [facility]   |
|                 |                          |                   | LABORATORY   |
+-----------------+--------------------------+-------------------+--------------+
| EOSINOPHILS ABS | 0.00                     | 0.00 - 0.50 K/uL  | [facility]   |
|                 |                          |                   | LABORATORY   |
+-----------------+--------------------------+-------------------+--------------+
| BASOPHILS ABS   | 0.02Comment: Testing     | 0.00 - 0.10 K/uL  | [facility]   |
|                 | performed at [facility], [address] |                   | LABORATORY   |
|                 |  [address],        |                   |              |
|                 | [place]  [address]     |                   |              |
+-----------------+--------------------------+-------------------+--------------+
 
 
 
+----------+
| Specimen |
+----------+
| Blood    |
+----------+
 
 
 
+---------------+-------------------------+---------------------+----------------+
| Performing    | Address                 | City/State/Zipcode  | Phone Number   |
| Organization  |                         |                     |                |
+---------------+-------------------------+---------------------+----------------+
|   [facility]  |   [address]  | [place] [address] |   [phone] |
| LABORATORY    | [address]                   |                     |                |
+---------------+-------------------------+---------------------+----------------+
 POCT glucose (2019  9:00 PM)
 
+--------------------+--------------------------+---------------+-----------------+
| Component          | Value                    | Ref Range     | Performed At    |
+--------------------+--------------------------+---------------+-----------------+
| GLUCOSE,POC SCREEN | 148 (H)Comment: Testing  | 65 - 99 mg/dL | [facility] |
|                    | performed at [facility];[address]     |               |                 |
|                    | [address];[place], [place]   |               |                 |
|                    | [id number]                    |               |                 |
 
+--------------------+--------------------------+---------------+-----------------+
 
 
 
+-------------------+------------------+--------------------+--------------+
| Performing        | Address          | City/State/Zipcode | Phone Number |
| Organization      |                  |                    |              |
+-------------------+------------------+--------------------+--------------+
|   [facility] |   [address] | [place] [address] |              |
+-------------------+------------------+--------------------+--------------+
 IR stent femoral popliteal (2019  6:45 PM)
 
+------------------------------------------------------------------------+--------------+
| Impressions                                                            | Performed At |
+------------------------------------------------------------------------+--------------+
|      1. Aorta and iliac arteries were calcified but without            |   [facility]     |
| significant stenosis.  2. Right SFA with high-grade stenosis           | RADIOLOGY    |
| proximally with good endograft results after angioplasty and stenting. |              |
|   3. 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
| artery to right foot.  4. Right posterior tibial artery was            |              |
| chronically occluded.     Electronically signed by Kirt Mclaughlin MD on    |              |
| 2019 3:50 PM                                                      |              |
+------------------------------------------------------------------------+--------------+
 
 
 
+------------------------------------------------------------------------+--------------+
| Narrative                                                              | Performed At |
+------------------------------------------------------------------------+--------------+
|   LOWER EXTREMITY ARTERIOGRAM, UNILATERAL     PREOPERATIVE             |   [facility]     |
| DIAGNOSIS:    Critical limb ischemia of right lower extremity with     | RADIOLOGY    |
| poorly healing wound of right great toe     POSTOPERATIVE              |              |
| DIAGNOSIS:    Same     PROCEDURE:  1. Moderate conscious sedation  2.  |              |
| Ultrasound guided access of the left common femoral artery  3.         |              |
| Aortogram  4. Selective right common femoral artery catheterization    |              |
| with right leg runoff  5. Right SFA stenting using 6 x 80 mm           |              |
| self-expanding stent  6 Drug eluting balloon angioplasty of right SFA  |              |
| using 4 x 100 mm Lutonix balloon     SURGEON: Kirt Mclaughlin MD              |              |
| ASSISTANT: None     ANESTHESIA: Moderate sedation and local anesthesia |              |
|      Informed consent was obtained from the patient. Continuous        |              |
| cardiac monitoring was performed throughout the procedure.  Conscious  |              |
| sedation was provided by the nursing staff during the procedure under  |              |
| my supervision.  Sedation time: 34 minutes  Medications: 2 mg Versed   |              |
| IV, 50 mcg Fentanyl IV     ESTIMATED BLOOD LOSS: Minimal               |              |
| CONTRAST:    53 mL of Isovue 250     INDICATIONS:    See preoperative  |              |
| history and physical     FINDINGS:    Aorta and iliac arteries         |              |
| calcified but without significant stenosis. Right SFA with high-grade  |              |
| stenosis proximally. After angioplasty and stenting, good angiographic |              |
|  results. Good two-vessel runoff to right foot via right anterior      |              |
| tibial artery and peroneal artery. Right posterior tibial artery was   |              |
| chronically occluded.     DESCRIPTION OF PROCEDURE:    The patient was |              |
|  properly identified and brought to the Cath Lab. The patient was      |              |
| placed supine on the catheter table and patient was given moderate     |              |
| sedation by nursing staff under my supervision. Patient's bilateral    |              |
| groins were then prepped and draped in the usual sterile fashion.      |              |
| Local anesthetic was given to the left groin and the left common       |              |
| femoral artery was accessed under ultrasound guidance using a          |              |
| micropuncture needle. A micropuncture sheath was inserted over a wire  |              |
| and up sized to a 4 French sheath. A Omni flush catheter was then      |              |
| inserted into the distal aorta and aortogram was then performed with   |              |
 
| the findings as described above. The Omni Flush catheter was then used |              |
|  to guide a Glidewire into the right common femoral artery and then    |              |
| the catheter was then advanced over the wire. A right lower extremity  |              |
| runoff was then performed with the findings as described above.        |              |
| Next, an Amplatzer wire was then inserted over the catheter and the 4  |              |
| French sheath was removed. A 6 French destination sheath was then      |              |
| inserted over the wire with the tip of the sheath being placed into    |              |
| the right common femoral artery. A vertebral catheter was inserted     |              |
| over the wire and the wire was then removed. A Glidewire was then      |              |
| placed into the vertebral catheter and used to cross through the       |              |
| stenotic right SFA.    Next, a 260 fix core wire was then inserted     |              |
| over the catheter.    Right SFA was stented using 6 x 80 mm            |              |
| self-expanding stent. Drug eluting balloon angioplasty of the right    |              |
| SFA was then performed using 4 x 100 mm Lutonix balloon.     A final   |              |
| arteriogram was then performed through the sheath. The destination     |              |
| sheath was then removed and a Mynx closure device was then used on the |              |
|  left common femoral artery and hemostasis was ensured. The patient    |              |
| was then taken to the observation unit for further monitoring.         |              |
+------------------------------------------------------------------------+--------------+
 
 
 
+-------------------------------------------------------------------------------------------
--------------------------------------------------------------------------------------------
-----------------------------------+
| Procedure Note                                                                            
                                                                                            
                                   |
+-------------------------------------------------------------------------------------------
--------------------------------------------------------------------------------------------
-----------------------------------+
|   Denny, Rad Results In - 2019  8:40 AM PST  LOWER EXTREMITY ARTERIOGRAM,             
                                                                                            
                                   |
| UNILATERALPREOPERATIVE DIAGNOSIS:  Critical limb ischemia of right lower extremity with   
                                                                                            
                                   |
| poorly healing wound of right great toePOSTOPERATIVE DIAGNOSIS:  SamePROCEDURE:1.         
                                                                                            
                                   |
| Moderate conscious sedation2. Ultrasound guided access of the left common femoral         
                                                                                            
                                   |
| artery3. Aortogram4. Selective right common femoral artery catheterization with right     
                                                                                            
                                   |
| leg runoff5. Right SFA stenting using 6 x 80 mm self-expanding stent6 Drug eluting        
                                                                                            
                                   |
| balloon angioplasty of right SFA using 4 x 100 mm Lutonix balloonSURGEON: Kirt Mclaughlin,        
                                                                                            
                                   |
| MDASSISTANT: NoneANESTHESIA: Moderate sedation and local anesthesiaInformed consent was   
                                                                                            
                                   |
| obtained from the patient. Continuous cardiac monitoring was performed throughout the     
                                                                                            
                                   |
| procedure.Conscious sedation was provided by the nursing staff during the procedure       
                                                                                            
 
                                   |
| under my supervision.Sedation time: 34 minutesMedications: 2 mg Versed IV, 50 mcg         
                                                                                            
                                   |
| Fentanyl IVESTIMATED BLOOD LOSS: MinimalCONTRAST:  53 mL of Isovue 250INDICATIONS:  See   
                                                                                            
                                   |
| preoperative history and physicalFINDINGS:  Aorta and iliac arteries calcified but        
                                                                                            
                                   |
| without significant stenosis. Right SFA with high-grade stenosis proximally. After        
                                                                                            
                                   |
| angioplasty and stenting, good angiographic results. Good two-vessel runoff to right      
                                                                                            
                                   |
| foot via right anterior tibial artery and peroneal artery. Right posterior tibial artery  
                                                                                            
                                   |
|  was chronically occluded.DESCRIPTION OF PROCEDURE:  The patient was properly identified  
                                                                                            
                                   |
|  and brought to the Cath Lab. The patient was placed supine on the catheter table and     
                                                                                            
                                   |
| patient was given moderate sedation by nursing staff under my supervision. Patient's      
                                                                                            
                                   |
| bilateral groins were then prepped and draped in the usual sterile fashion. Local         
                                                                                            
                                   |
| anesthetic was given to the left groin and the left common femoral artery was accessed    
                                                                                            
                                   |
| under ultrasound guidance using a micropuncture needle. A micropuncture sheath was        
                                                                                            
                                   |
| inserted over a wire and up sized to a 4 French sheath. A Omni flush catheter was then    
                                                                                            
                                   |
| inserted into the distal aorta and aortogram was then performed with the findings as      
                                                                                            
                                   |
| described above. The Omni Flush catheter was then used to guide a Glidewire into the      
                                                                                            
                                   |
| right common femoral artery and then the catheter was then advanced over the wire. A      
                                                                                            
                                   |
| right lower extremity runoff was then performed with the findings as described            
                                                                                            
                                   |
| above.Next, an Amplatzer wire was then inserted over the catheter and the 4 French        
                                                                                            
                                   |
| sheath was removed. A 6 French destination sheath was then inserted over the wire with    
                                                                                            
                                   |
| the tip of the sheath being placed into the right common femoral artery. A vertebral      
                                                                                            
 
                                   |
| catheter was inserted over the wire and the wire was then removed. A Glidewire was then   
                                                                                            
                                   |
| placed into the vertebral catheter and used to cross through the stenotic right SFA.      
                                                                                            
                                   |
| Next, a 260 fix core wire was then inserted over the catheter.  Right SFA was stented     
                                                                                            
                                   |
| using 6 x 80 mm self-expanding stent. Drug eluting balloon angioplasty of the right SFA   
                                                                                            
                                   |
| was then performed using 4 x 100 mm Lutonix balloon.A final arteriogram was then          
                                                                                            
                                   |
| performed through the sheath. The destination sheath was then removed and a Mynx closure  
                                                                                            
                                   |
|  device was then used on the left common femoral artery and hemostasis was ensured. The   
                                                                                            
                                   |
| patient was then taken to the observation unit for further monitoring.IMPRESSION:1.       
                                                                                            
                                   |
| Aorta and iliac arteries were calcified but without significant stenosis.2. Right SFA     
                                                                                            
                                   |
| with high-grade stenosis proximally with good endograft results after angioplasty and     
                                                                                            
                                   |
| stenting.3. Two-vessel runoff via right anterior tibial artery and peroneal artery to     
                                                                                            
                                   |
| right foot.4. 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       
                                                                                            
                                   |
| signed by Kirt Mclaughlin MD on 2019 3:50 PM                                              
                                                                                            
                                   |
|                                                                                           
                                                                                            
                                   |
|A final arteriogram was then performed through the sheath. The destination sheath was then 
removed and a Mynx closure device was then used on the left common femoral artery and hemost
asis was ensured. The patient was  |
|then taken to the observation unit for further monitoring.                                 
                                                                                            
                                   |
|                                                                                           
                                                                                            
                                   |
|IMPRESSION:                                                                                
                                                                                            
                                  |
|                                                                                           
                                                                                            
                                   |
|1. Aorta and iliac arteries were calcified but without significant stenosis.               
                                                                                            
 
                                   |
|2. Right SFA with high-grade stenosis proximally with good endograft results after angiopla
sty and stenting.                                                                           
                                   |
|3. Two-vessel runoff via right anterior tibial artery and peroneal artery to right foot.   
                                                                                            
                                   |
|4. 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                                 
                                                                                            
                                   |
|                                                                                           
                                                                                            
                                   |
|Electronically signed by Kirt Mclaughlin MD on 2019 3:50 PM                                
                                                                                            
                                   |
+-------------------------------------------------------------------------------------------
--------------------------------------------------------------------------------------------
-----------------------------------+
 
 
 
+--------------------+------------------+--------------------+--------------+
| Performing         | Address          | City/State/Zipcode | Phone Number |
| Organization       |                  |                    |              |
+--------------------+------------------+--------------------+--------------+
|   [facility] |   [address] | [place] [address] |              |
+--------------------+------------------+--------------------+--------------+
 IR aortagram abdominal (2019  6:45 PM)
 
+------------------------------------------------------------------------+--------------+
| Impressions                                                            | Performed At |
+------------------------------------------------------------------------+--------------+
|      1. Aorta and iliac arteries were calcified but without            |   [facility]     |
| significant stenosis.  2. Right SFA with high-grade stenosis           | RADIOLOGY    |
| proximally with good endograft results after angioplasty and stenting. |              |
|   3. 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
| artery to right foot.  4. Right posterior tibial artery was            |              |
| chronically occluded.     Electronically signed by Kirt Mclaughlin MD on    |              |
| 2019 3:50 PM                                                      |              |
+------------------------------------------------------------------------+--------------+
 
 
 
+------------------------------------------------------------------------+--------------+
| Narrative                                                              | Performed At |
+------------------------------------------------------------------------+--------------+
|   LOWER EXTREMITY ARTERIOGRAM, UNILATERAL     PREOPERATIVE             |   [facility]     |
| DIAGNOSIS:    Critical limb ischemia of right lower extremity with     | RADIOLOGY    |
| poorly healing wound of right great toe     POSTOPERATIVE              |              |
| DIAGNOSIS:    Same     PROCEDURE:  1. Moderate conscious sedation  2.  |              |
| Ultrasound guided access of the left common femoral artery  3.         |              |
| Aortogram  4. Selective right common femoral artery catheterization    |              |
| with right leg runoff  5. Right SFA stenting using 6 x 80 mm           |              |
| self-expanding stent  6 Drug eluting balloon angioplasty of right SFA  |              |
| using 4 x 100 mm Lutonix balloon     SURGEON: Kirt Mclaughlin MD              |              |
| ASSISTANT: None     ANESTHESIA: Moderate sedation and local anesthesia |              |
|      Informed consent was obtained from the patient. Continuous        |              |
| cardiac monitoring was performed throughout the procedure.  Conscious  |              |
| sedation was provided by the nursing staff during the procedure under  |              |
 
| my supervision.  Sedation time: 34 minutes  Medications: 2 mg Versed   |              |
| IV, 50 mcg Fentanyl IV     ESTIMATED BLOOD LOSS: Minimal               |              |
| CONTRAST:    53 mL of Isovue 250     INDICATIONS:    See preoperative  |              |
| history and physical     FINDINGS:    Aorta and iliac arteries         |              |
| calcified but without significant stenosis. Right SFA with high-grade  |              |
| stenosis proximally. After angioplasty and stenting, good angiographic |              |
|  results. Good two-vessel runoff to right foot via right anterior      |              |
| tibial artery and peroneal artery. Right posterior tibial artery was   |              |
| chronically occluded.     DESCRIPTION OF PROCEDURE:    The patient was |              |
|  properly identified and brought to the Cath Lab. The patient was      |              |
| placed supine on the catheter table and patient was given moderate     |              |
| sedation by nursing staff under my supervision. Patient's bilateral    |              |
| groins were then prepped and draped in the usual sterile fashion.      |              |
| Local anesthetic was given to the left groin and the left common       |              |
| femoral artery was accessed under ultrasound guidance using a          |              |
| micropuncture needle. A micropuncture sheath was inserted over a wire  |              |
| and up sized to a 4 French sheath. A Omni flush catheter was then      |              |
| inserted into the distal aorta and aortogram was then performed with   |              |
| the findings as described above. The Omni Flush catheter was then used |              |
|  to guide a Glidewire into the right common femoral artery and then    |              |
| the catheter was then advanced over the wire. A right lower extremity  |              |
| runoff was then performed with the findings as described above.        |              |
| Next, an Amplatzer wire was then inserted over the catheter and the 4  |              |
| French sheath was removed. A 6 French destination sheath was then      |              |
| inserted over the wire with the tip of the sheath being placed into    |              |
| the right common femoral artery. A vertebral catheter was inserted     |              |
| over the wire and the wire was then removed. A Glidewire was then      |              |
| placed into the vertebral catheter and used to cross through the       |              |
| stenotic right SFA.    Next, a 260 fix core wire was then inserted     |              |
| over the catheter.    Right SFA was stented using 6 x 80 mm            |              |
| self-expanding stent. Drug eluting balloon angioplasty of the right    |              |
| SFA was then performed using 4 x 100 mm Lutonix balloon.     A final   |              |
| arteriogram was then performed through the sheath. The destination     |              |
| sheath was then removed and a Mynx closure device was then used on the |              |
|  left common femoral artery and hemostasis was ensured. The patient    |              |
| was then taken to the observation unit for further monitoring.         |              |
+------------------------------------------------------------------------+--------------+
 
 
 
+-------------------------------------------------------------------------------------------
--------------------------------------------------------------------------------------------
-----------------------------------+
| Procedure Note                                                                            
                                                                                            
                                   |
+-------------------------------------------------------------------------------------------
--------------------------------------------------------------------------------------------
-----------------------------------+
|   Denny, Rad Results In - 2019  8:40 AM PST  LOWER EXTREMITY ARTERIOGRAM,             
                                                                                            
                                   |
| UNILATERALPREOPERATIVE DIAGNOSIS:  Critical limb ischemia of right lower extremity with   
                                                                                            
                                   |
| poorly healing wound of right great toePOSTOPERATIVE DIAGNOSIS:  SamePROCEDURE:1.         
                                                                                            
                                   |
| Moderate conscious sedation2. Ultrasound guided access of the left common femoral         
                                                                                            
 
                                   |
| artery3. Aortogram4. Selective right common femoral artery catheterization with right     
                                                                                            
                                   |
| leg runoff5. Right SFA stenting using 6 x 80 mm self-expanding stent6 Drug eluting        
                                                                                            
                                   |
| balloon angioplasty of right SFA using 4 x 100 mm Lutonix balloonSURGEON: Kirt Mclaughlin,        
                                                                                            
                                   |
| MDASSISTANT: NoneANESTHESIA: Moderate sedation and local anesthesiaInformed consent was   
                                                                                            
                                   |
| obtained from the patient. Continuous cardiac monitoring was performed throughout the     
                                                                                            
                                   |
| procedure.Conscious sedation was provided by the nursing staff during the procedure       
                                                                                            
                                   |
| under my supervision.Sedation time: 34 minutesMedications: 2 mg Versed IV, 50 mcg         
                                                                                            
                                   |
| Fentanyl IVESTIMATED BLOOD LOSS: MinimalCONTRAST:  53 mL of Isovue 250INDICATIONS:  See   
                                                                                            
                                   |
| preoperative history and physicalFINDINGS:  Aorta and iliac arteries calcified but        
                                                                                            
                                   |
| without significant stenosis. Right SFA with high-grade stenosis proximally. After        
                                                                                            
                                   |
| angioplasty and stenting, good angiographic results. Good two-vessel runoff to right      
                                                                                            
                                   |
| foot via right anterior tibial artery and peroneal artery. Right posterior tibial artery  
                                                                                            
                                   |
|  was chronically occluded.DESCRIPTION OF PROCEDURE:  The patient was properly identified  
                                                                                            
                                   |
|  and brought to the Cath Lab. The patient was placed supine on the catheter table and     
                                                                                            
                                   |
| patient was given moderate sedation by nursing staff under my supervision. Patient's      
                                                                                            
                                   |
| bilateral groins were then prepped and draped in the usual sterile fashion. Local         
                                                                                            
                                   |
| anesthetic was given to the left groin and the left common femoral artery was accessed    
                                                                                            
                                   |
| under ultrasound guidance using a micropuncture needle. A micropuncture sheath was        
                                                                                            
                                   |
| inserted over a wire and up sized to a 4 French sheath. A Omni flush catheter was then    
                                                                                            
                                   |
| inserted into the distal aorta and aortogram was then performed with the findings as      
                                                                                            
 
                                   |
| described above. The Omni Flush catheter was then used to guide a Glidewire into the      
                                                                                            
                                   |
| right common femoral artery and then the catheter was then advanced over the wire. A      
                                                                                            
                                   |
| right lower extremity runoff was then performed with the findings as described            
                                                                                            
                                   |
| above.Next, an Amplatzer wire was then inserted over the catheter and the 4 French        
                                                                                            
                                   |
| sheath was removed. A 6 French destination sheath was then inserted over the wire with    
                                                                                            
                                   |
| the tip of the sheath being placed into the right common femoral artery. A vertebral      
                                                                                            
                                   |
| catheter was inserted over the wire and the wire was then removed. A Glidewire was then   
                                                                                            
                                   |
| placed into the vertebral catheter and used to cross through the stenotic right SFA.      
                                                                                            
                                   |
| Next, a 260 fix core wire was then inserted over the catheter.  Right SFA was stented     
                                                                                            
                                   |
| using 6 x 80 mm self-expanding stent. Drug eluting balloon angioplasty of the right SFA   
                                                                                            
                                   |
| was then performed using 4 x 100 mm Lutonix balloon.A final arteriogram was then          
                                                                                            
                                   |
| performed through the sheath. The destination sheath was then removed and a Mynx closure  
                                                                                            
                                   |
|  device was then used on the left common femoral artery and hemostasis was ensured. The   
                                                                                            
                                   |
| patient was then taken to the observation unit for further monitoring.IMPRESSION:1.       
                                                                                            
                                   |
| Aorta and iliac arteries were calcified but without significant stenosis.2. Right SFA     
                                                                                            
                                   |
| with high-grade stenosis proximally with good endograft results after angioplasty and     
                                                                                            
                                   |
| stenting.3. Two-vessel runoff via right anterior tibial artery and peroneal artery to     
                                                                                            
                                   |
| right foot.4. 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       
                                                                                            
                                   |
| signed by Kirt Mclaughlin MD on 2019 3:50 PM                                              
                                                                                            
                                   |
|                                                                                           
                                                                                            
 
                                   |
|A final arteriogram was then performed through the sheath. The destination sheath was then 
removed and a Mynx closure device was then used on the left common femoral artery and hemost
asis was ensured. The patient was  |
|then taken to the observation unit for further monitoring.                                 
                                                                                            
                                   |
|                                                                                           
                                                                                            
                                   |
|IMPRESSION:                                                                                
                                                                                            
                                  |
|                                                                                           
                                                                                            
                                   |
|1. Aorta and iliac arteries were calcified but without significant stenosis.               
                                                                                            
                                   |
|2. Right SFA with high-grade stenosis proximally with good endograft results after angiopla
sty and stenting.                                                                           
                                   |
|3. Two-vessel runoff via right anterior tibial artery and peroneal artery to right foot.   
                                                                                            
                                   |
|4. 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                                 
                                                                                            
                                   |
|                                                                                           
                                                                                            
                                   |
|Electronically signed by Kirt Mclaughlin MD on 2019 3:50 PM                                
                                                                                            
                                   |
+-------------------------------------------------------------------------------------------
--------------------------------------------------------------------------------------------
-----------------------------------+
 
 
 
+--------------------+------------------+--------------------+--------------+
| Performing         | Address          | City/State/Zipcode | Phone Number |
| Organization       |                  |                    |              |
+--------------------+------------------+--------------------+--------------+
|   [facility] |   [address] | [place] [address] |              |
+--------------------+------------------+--------------------+--------------+
 IR angiogram extremity right (2019  6:45 PM)
 
+------------------------------------------------------------------------+--------------+
| Impressions                                                            | Performed At |
+------------------------------------------------------------------------+--------------+
|      1. Aorta and iliac arteries were calcified but without            |   [facility]     |
| significant stenosis.  2. Right SFA with high-grade stenosis           | RADIOLOGY    |
| proximally with good endograft results after angioplasty and stenting. |              |
|   3. 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
| artery to right foot.  4. Right posterior tibial artery was            |              |
| chronically occluded.     Electronically signed by Kirt Mclaughlin MD on    |              |
| 2019 3:50 PM                                                      |              |
+------------------------------------------------------------------------+--------------+
 
 
 
 
+------------------------------------------------------------------------+--------------+
| Narrative                                                              | Performed At |
+------------------------------------------------------------------------+--------------+
|   LOWER EXTREMITY ARTERIOGRAM, UNILATERAL     PREOPERATIVE             |   [facility]     |
| DIAGNOSIS:    Critical limb ischemia of right lower extremity with     | RADIOLOGY    |
| poorly healing wound of right great toe     POSTOPERATIVE              |              |
| DIAGNOSIS:    Same     PROCEDURE:  1. Moderate conscious sedation  2.  |              |
| Ultrasound guided access of the left common femoral artery  3.         |              |
| Aortogram  4. Selective right common femoral artery catheterization    |              |
| with right leg runoff  5. Right SFA stenting using 6 x 80 mm           |              |
| self-expanding stent  6 Drug eluting balloon angioplasty of right SFA  |              |
| using 4 x 100 mm Lutonix balloon     SURGEON: Kirt Mclaughlin MD              |              |
| ASSISTANT: None     ANESTHESIA: Moderate sedation and local anesthesia |              |
|      Informed consent was obtained from the patient. Continuous        |              |
| cardiac monitoring was performed throughout the procedure.  Conscious  |              |
| sedation was provided by the nursing staff during the procedure under  |              |
| my supervision.  Sedation time: 34 minutes  Medications: 2 mg Versed   |              |
| IV, 50 mcg Fentanyl IV     ESTIMATED BLOOD LOSS: Minimal               |              |
| CONTRAST:    53 mL of Isovue 250     INDICATIONS:    See preoperative  |              |
| history and physical     FINDINGS:    Aorta and iliac arteries         |              |
| calcified but without significant stenosis. Right SFA with high-grade  |              |
| stenosis proximally. After angioplasty and stenting, good angiographic |              |
|  results. Good two-vessel runoff to right foot via right anterior      |              |
| tibial artery and peroneal artery. Right posterior tibial artery was   |              |
| chronically occluded.     DESCRIPTION OF PROCEDURE:    The patient was |              |
|  properly identified and brought to the Cath Lab. The patient was      |              |
| placed supine on the catheter table and patient was given moderate     |              |
| sedation by nursing staff under my supervision. Patient's bilateral    |              |
| groins were then prepped and draped in the usual sterile fashion.      |              |
| Local anesthetic was given to the left groin and the left common       |              |
| femoral artery was accessed under ultrasound guidance using a          |              |
| micropuncture needle. A micropuncture sheath was inserted over a wire  |              |
| and up sized to a 4 French sheath. A Omni flush catheter was then      |              |
| inserted into the distal aorta and aortogram was then performed with   |              |
| the findings as described above. The Omni Flush catheter was then used |              |
|  to guide a Glidewire into the right common femoral artery and then    |              |
| the catheter was then advanced over the wire. A right lower extremity  |              |
| runoff was then performed with the findings as described above.        |              |
| Next, an Amplatzer wire was then inserted over the catheter and the 4  |              |
| French sheath was removed. A 6 French destination sheath was then      |              |
| inserted over the wire with the tip of the sheath being placed into    |              |
| the right common femoral artery. A vertebral catheter was inserted     |              |
| over the wire and the wire was then removed. A Glidewire was then      |              |
| placed into the vertebral catheter and used to cross through the       |              |
| stenotic right SFA.    Next, a 260 fix core wire was then inserted     |              |
| over the catheter.    Right SFA was stented using 6 x 80 mm            |              |
| self-expanding stent. Drug eluting balloon angioplasty of the right    |              |
| SFA was then performed using 4 x 100 mm Lutonix balloon.     A final   |              |
| arteriogram was then performed through the sheath. The destination     |              |
| sheath was then removed and a Mynx closure device was then used on the |              |
|  left common femoral artery and hemostasis was ensured. The patient    |              |
| was then taken to the observation unit for further monitoring.         |              |
+------------------------------------------------------------------------+--------------+
 
 
 
+-------------------------------------------------------------------------------------------
--------------------------------------------------------------------------------------------
 
-----------------------------------+
| Procedure Note                                                                            
                                                                                            
                                   |
+-------------------------------------------------------------------------------------------
--------------------------------------------------------------------------------------------
-----------------------------------+
|   Lauro Baldwin Results In - 2019  8:40 AM PST  LOWER EXTREMITY ARTERIOGRAM,             
                                                                                            
                                   |
| UNILATERALPREOPERATIVE DIAGNOSIS:  Critical limb ischemia of right lower extremity with   
                                                                                            
                                   |
| poorly healing wound of right great toePOSTOPERATIVE DIAGNOSIS:  SamePROCEDURE:1.         
                                                                                            
                                   |
| Moderate conscious sedation2. Ultrasound guided access of the left common femoral         
                                                                                            
                                   |
| artery3. Aortogram4. Selective right common femoral artery catheterization with right     
                                                                                            
                                   |
| leg runoff5. Right SFA stenting using 6 x 80 mm self-expanding stent6 Drug eluting        
                                                                                            
                                   |
| balloon angioplasty of right SFA using 4 x 100 mm Lutonix balloonSURGEON: Kirt Mclaughlin,        
                                                                                            
                                   |
| MDASSISTANT: NoneANESTHESIA: Moderate sedation and local anesthesiaInformed consent was   
                                                                                            
                                   |
| obtained from the patient. Continuous cardiac monitoring was performed throughout the     
                                                                                            
                                   |
| procedure.Conscious sedation was provided by the nursing staff during the procedure       
                                                                                            
                                   |
| under my supervision.Sedation time: 34 minutesMedications: 2 mg Versed IV, 50 mcg         
                                                                                            
                                   |
| Fentanyl IVESTIMATED BLOOD LOSS: MinimalCONTRAST:  53 mL of Isovue 250INDICATIONS:  See   
                                                                                            
                                   |
| preoperative history and physicalFINDINGS:  Aorta and iliac arteries calcified but        
                                                                                            
                                   |
| without significant stenosis. Right SFA with high-grade stenosis proximally. After        
                                                                                            
                                   |
| angioplasty and stenting, good angiographic results. Good two-vessel runoff to right      
                                                                                            
                                   |
| foot via right anterior tibial artery and peroneal artery. Right posterior tibial artery  
                                                                                            
                                   |
|  was chronically occluded.DESCRIPTION OF PROCEDURE:  The patient was properly identified  
                                                                                            
                                   |
|  and brought to the Cath Lab. The patient was placed supine on the catheter table and     
                                                                                            
 
                                   |
| patient was given moderate sedation by nursing staff under my supervision. Patient's      
                                                                                            
                                   |
| bilateral groins were then prepped and draped in the usual sterile fashion. Local         
                                                                                            
                                   |
| anesthetic was given to the left groin and the left common femoral artery was accessed    
                                                                                            
                                   |
| under ultrasound guidance using a micropuncture needle. A micropuncture sheath was        
                                                                                            
                                   |
| inserted over a wire and up sized to a 4 French sheath. A Omni flush catheter was then    
                                                                                            
                                   |
| inserted into the distal aorta and aortogram was then performed with the findings as      
                                                                                            
                                   |
| described above. The Omni Flush catheter was then used to guide a Glidewire into the      
                                                                                            
                                   |
| right common femoral artery and then the catheter was then advanced over the wire. A      
                                                                                            
                                   |
| right lower extremity runoff was then performed with the findings as described            
                                                                                            
                                   |
| above.Next, an Amplatzer wire was then inserted over the catheter and the 4 French        
                                                                                            
                                   |
| sheath was removed. A 6 French destination sheath was then inserted over the wire with    
                                                                                            
                                   |
| the tip of the sheath being placed into the right common femoral artery. A vertebral      
                                                                                            
                                   |
| catheter was inserted over the wire and the wire was then removed. A Glidewire was then   
                                                                                            
                                   |
| placed into the vertebral catheter and used to cross through the stenotic right SFA.      
                                                                                            
                                   |
| Next, a 260 fix core wire was then inserted over the catheter.  Right SFA was stented     
                                                                                            
                                   |
| using 6 x 80 mm self-expanding stent. Drug eluting balloon angioplasty of the right SFA   
                                                                                            
                                   |
| was then performed using 4 x 100 mm Lutonix balloon.A final arteriogram was then          
                                                                                            
                                   |
| performed through the sheath. The destination sheath was then removed and a Mynx closure  
                                                                                            
                                   |
|  device was then used on the left common femoral artery and hemostasis was ensured. The   
                                                                                            
                                   |
| patient was then taken to the observation unit for further monitoring.IMPRESSION:1.       
                                                                                            
 
                                   |
| Aorta and iliac arteries were calcified but without significant stenosis.2. Right SFA     
                                                                                            
                                   |
| with high-grade stenosis proximally with good endograft results after angioplasty and     
                                                                                            
                                   |
| stenting.3. Two-vessel runoff via right anterior tibial artery and peroneal artery to     
                                                                                            
                                   |
| right foot.4. 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       
                                                                                            
                                   |
| signed by Kirt Mclaughlin MD on 2019 3:50 PM                                              
                                                                                            
                                   |
|                                                                                           
                                                                                            
                                   |
|A final arteriogram was then performed through the sheath. The destination sheath was then 
removed and a Mynx closure device was then used on the left common femoral artery and hemost
asis was ensured. The patient was  |
|then taken to the observation unit for further monitoring.                                 
                                                                                            
                                   |
|                                                                                           
                                                                                            
                                   |
|IMPRESSION:                                                                                
                                                                                            
                                  |
|                                                                                           
                                                                                            
                                   |
|1. Aorta and iliac arteries were calcified but without significant stenosis.               
                                                                                            
                                   |
|2. Right SFA with high-grade stenosis proximally with good endograft results after angiopla
sty and stenting.                                                                           
                                   |
|3. Two-vessel runoff via right anterior tibial artery and peroneal artery to right foot.   
                                                                                            
                                   |
|4. 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                                 
                                                                                            
                                   |
|                                                                                           
                                                                                            
                                   |
|Electronically signed by Kirt Mclaughlin MD on 2019 3:50 PM                                
                                                                                            
                                   |
+-------------------------------------------------------------------------------------------
--------------------------------------------------------------------------------------------
-----------------------------------+
 
 
 
+--------------------+------------------+--------------------+--------------+
| Performing         | Address          | City/State/Zipcode | Phone Number |
 
| Organization       |                  |                    |              |
+--------------------+------------------+--------------------+--------------+
|   [facility] |   [address] | [place] [address] |              |
+--------------------+------------------+--------------------+--------------+
 Blood Culture Set 2 (2019  6:05 PM)
 
+----------------------+------------------------+-----------+--------------+
| Component            | Value                  | Ref Range | Performed At |
+----------------------+------------------------+-----------+--------------+
| Specimen Description | BLOOD, PERIPHERAL DRAW |           | [facility]   |
|                      |                        |           | LABORATORY   |
+----------------------+------------------------+-----------+--------------+
| CULTURE              | NO GROWTH 6 DAYS       |           | [facility]   |
|                      |                        |           | LABORATORY   |
+----------------------+------------------------+-----------+--------------+
 
 
 
+-----------------+
| Specimen        |
+-----------------+
| Blood - Blood,  |
| peripheral draw |
+-----------------+
 
 
 
+---------------+-------------------------+---------------------+----------------+
| Performing    | Address                 | City/State/Zipcode  | Phone Number   |
| Organization  |                         |                     |                |
+---------------+-------------------------+---------------------+----------------+
|   [facility]  |   [address]  | [place] [address] |   [phone] |
| LABORATORY    | [address]                   |                     |                |
+---------------+-------------------------+---------------------+----------------+
 IR guidance vascular access US (2019  5:40 PM)
 
+------------------------------------------------------------------------+--------------+
| Narrative                                                              | Performed At |
+------------------------------------------------------------------------+--------------+
|   This Point of Care (POC) ultrasound image has been reviewed and      |   [facility]     |
| interpreted by the physician identified as the performing physician in | RADIOLOGY    |
|  the   associated interpretation and report.                           |              |
+------------------------------------------------------------------------+--------------+
 
 
 
+--------------------+------------------+--------------------+--------------+
| Performing         | Address          | City/State/Zipcode | Phone Number |
| Organization       |                  |                    |              |
+--------------------+------------------+--------------------+--------------+
|   [facility] |   [address] | [place] [address] |              |
+--------------------+------------------+--------------------+--------------+
 C-reactive protein (2019  3:43 PM)
 
+-----------+--------------------------+------------+-----------------+
| Component | Value                    | Ref Range  | Performed At    |
+-----------+--------------------------+------------+-----------------+
| CRP       | 0.6 (H)Comment: Testing  | <0.5 mg/dL | [facility] |
|           | performed at [facility];[address]     |            |                 |
|           | [address];[place]   |            |                 |
 
|           | [id number]                    |            |                 |
+-----------+--------------------------+------------+-----------------+
 
 
 
+-------------------+
| Specimen          |
+-------------------+
| Blood - Arm, Left |
+-------------------+
 
 
 
+-------------------+------------------+--------------------+--------------+
| Performing        | Address          | City/State/Zipcode | Phone Number |
| Organization      |                  |                    |              |
+-------------------+------------------+--------------------+--------------+
|   [facility] |   [address] | [place] [address] |              |
+-------------------+------------------+--------------------+--------------+
 Sedimentation rate, automated (2019  3:43 PM)
 
+-----------+-------------------------+--------------+-----------------+
| Component | Value                   | Ref Range    | Performed At    |
+-----------+-------------------------+--------------+-----------------+
| ESR       | 13Comment: Testing      | 0 - 30 mm/Hr | [facility] |
|           | performed at [facility];[address]    |              |                 |
|           | [address];[place]  |              |                 |
|           | [address]                   |              |                 |
+-----------+-------------------------+--------------+-----------------+
 
 
 
+-------------------+
| Specimen          |
+-------------------+
| Blood - Arm, Left |
+-------------------+
 
 
 
+-------------------+------------------+--------------------+--------------+
| Performing        | Address          | City/State/Zipcode | Phone Number |
| Organization      |                  |                    |              |
+-------------------+------------------+--------------------+--------------+
|   [facility] |   [address] | [place] [address] |              |
+-------------------+------------------+--------------------+--------------+
 CPK (2019  3:43 PM)
 
+-----------+-------------------------+--------------+-----------------+
| Component | Value                   | Ref Range    | Performed At    |
+-----------+-------------------------+--------------+-----------------+
| CPK       | 28 (L)Comment: Testing  | 30 - 240 U/L | [facility] |
|           | performed at [facility];[address]    |              |                 |
|           | [address];[place], [place]  |              |                 |
|           | [address]                   |              |                 |
+-----------+-------------------------+--------------+-----------------+
 
 
 
+-------------------+
 
| Specimen          |
+-------------------+
| Blood - Arm, Left |
+-------------------+
 
 
 
+-------------------+------------------+--------------------+--------------+
| Performing        | Address          | City/State/Zipcode | Phone Number |
| Organization      |                  |                    |              |
+-------------------+------------------+--------------------+--------------+
|   [facility] |   [address] | [place] [address] |              |
+-------------------+------------------+--------------------+--------------+
 Comprehensive metabolic panel (2019  3:43 PM)
 
+----------------+--------------------------+-------------------+-----------------+
| Component      | Value                    | Ref Range         | Performed At    |
+----------------+--------------------------+-------------------+-----------------+
| SODIUM         | 139                      | 135 - 145 mmol/L  | [facility] |
+----------------+--------------------------+-------------------+-----------------+
| POTASSIUM      | 4.0                      | 3.5 - 4.9 mmol/L  | [facility] |
+----------------+--------------------------+-------------------+-----------------+
| CHLORIDE       | 100                      | 99 - 109 mmol/L   | [facility] |
+----------------+--------------------------+-------------------+-----------------+
| CO2            | 29                       | 23 - 32 mmol/L    | [facility] |
+----------------+--------------------------+-------------------+-----------------+
| ANION GAP AGAP | 14                       | 5 - 20 mmol/L     | [facility] |
+----------------+--------------------------+-------------------+-----------------+
| GLUCOSE        | 202 (H)                  | 65 - 99 mg/dL     | [facility] |
+----------------+--------------------------+-------------------+-----------------+
| BUN            | 23                       | 8 - 25 mg/dL      | [facility] |
+----------------+--------------------------+-------------------+-----------------+
| CREATININE     | 6.1 (H)                  | 0.50 - 1.00 mg/dL | [facility] |
+----------------+--------------------------+-------------------+-----------------+
| BUN/CREAT      | 4                        |                   | [facility] |
+----------------+--------------------------+-------------------+-----------------+
| CALCIUM        | 9.0                      | 8.5 - 10.5 mg/dL  | [facility] |
+----------------+--------------------------+-------------------+-----------------+
| TOTAL PROTEIN  | 6.2 (L)                  | 6.3 - 8.2 g/dL    | [facility] |
+----------------+--------------------------+-------------------+-----------------+
| Albumin        | 2.7 (L)                  | 3.3 - 4.8 g/dL    | [facility] |
+----------------+--------------------------+-------------------+-----------------+
| GLOBULIN       | 3.5                      | 1.3 - 4.9 g/dL    | [facility] |
+----------------+--------------------------+-------------------+-----------------+
| A/G            | 0.8 (L)                  | 1.0 - 2.4         | [facility] |
+----------------+--------------------------+-------------------+-----------------+
| TBIL           | 0.4                      | 0.1 - 1.5 mg/dL   | [facility] |
+----------------+--------------------------+-------------------+-----------------+
| ALK PHOS       | 110                      | 35 - 115 U/L      | [facility] |
+----------------+--------------------------+-------------------+-----------------+
| AST            | 24                       | 10 - 45 U/L       | [facility] |
+----------------+--------------------------+-------------------+-----------------+
| ALT            | 21                       | 10 - 65 U/L       | [facility] |
+----------------+--------------------------+-------------------+-----------------+
| EGFR           | 7 (L)Comment: GFR <60:   | >60 mL/min/1.73m2 | [facility] |
|                | CHRONIC KIDNEY DISEASE,  |                   |                 |
|                | IF FOUND OVER A 3 MONTH  |                   |                 |
|                | PERIOD.GFR <15: KIDNEY   |                   |                 |
|                | FAILURE.FOR       |                   |                 |
|                | AMERICANS, MULTIPLY THE  |                   |                 |
 
|                | CALCULATED GFR BY        |                   |                 |
|                | 1.210.This eGFR is       |                   |                 |
|                | calculated using the     |                   |                 |
|                | MDRD [facility] traceable      |                   |                 |
|                | equation.Testing         |                   |                 |
|                | performed at [facility]     |                   |                 |
|                | [facility];[place], [place]   |                   |                 |
|                | [address]                    |                   |                 |
+----------------+--------------------------+-------------------+-----------------+
 
 
 
+-------------------+
| Specimen          |
+-------------------+
| Blood - Arm, Left |
+-------------------+
 
 
 
+-------------------+------------------+--------------------+--------------+
| Performing        | Address          | City/State/Zipcode | Phone Number |
| Organization      |                  |                    |              |
+-------------------+------------------+--------------------+--------------+
|   [facility] |   [address] | [facility] [place] [address] |              |
+-------------------+------------------+--------------------+--------------+
 Blood Culture Set 1 (2019  3:43 PM)
 
+----------------------+------------------+-----------+-----------------+
| Component            | Value            | Ref Range | Performed At    |
+----------------------+------------------+-----------+-----------------+
| Specimen Description | BLOOD            |           | [facility]      |
|                      |                  |           | LABORATORY      |
+----------------------+------------------+-----------+-----------------+
| SPECIAL REQUESTS     | RAC              |           | [facility] |
+----------------------+------------------+-----------+-----------------+
| CULTURE              | NO GROWTH 6 DAYS |           | [facility]      |
|                      |                  |           | LABORATORY      |
+----------------------+------------------+-----------+-----------------+
 
 
 
+----------------+
| Specimen       |
+----------------+
| Blood - Blood  |
+----------------+
 
 
 
+-------------------+-------------------------+---------------------+----------------+
| Performing        | Address                 | City/State/Zipcode  | Phone Number   |
| Organization      |                         |                     |                |
+-------------------+-------------------------+---------------------+----------------+
|   [facility]      |   [address]  | [place] [address] |   [phone] |
| LABORATORY        | Blvd.                   |                     |                |
+-------------------+-------------------------+---------------------+----------------+
|   [facility] |   [address]        | [place] [address]  |                |
+-------------------+-------------------------+---------------------+----------------+
 CBC w/manual diff (2019  3:43 PM)
 
 
+----------------------+-------------------------+-------------------+-----------------+
| Component            | Value                   | Ref Range         | Performed At    |
+----------------------+-------------------------+-------------------+-----------------+
| WBC                  | 5.53Comment:            | 3.80 - 11.00 K/uL | [facility] |
+----------------------+-------------------------+-------------------+-----------------+
| RBC                  | 2.76 (L)                | 3.70 - 5.10 M/uL  | [facility] |
+----------------------+-------------------------+-------------------+-----------------+
| HGB                  | 9.4 (L)                 | 11.3 - 15.5 g/dL  | [facility] |
+----------------------+-------------------------+-------------------+-----------------+
| HCT                  | 28.4 (L)                | 34.0 - 46.0 %     | [facility] |
+----------------------+-------------------------+-------------------+-----------------+
| MCV                  | 102.9 (H)               | 80.0 - 100.0 fl   | [facility] |
+----------------------+-------------------------+-------------------+-----------------+
| MCH                  | 34.1 (H)                | 27.0 - 34.0 pg    | [facility] |
+----------------------+-------------------------+-------------------+-----------------+
| MCHC                 | 33.2                    | 32.0 - 35.5 g/dL  | [facility] |
+----------------------+-------------------------+-------------------+-----------------+
| RDW SD               | 61.3 (H)                | 37 - 53 fl        | [facility] |
+----------------------+-------------------------+-------------------+-----------------+
| PLT                  | 147 (L)Comment:         | 150 - 400 K/uL    | [facility] |
+----------------------+-------------------------+-------------------+-----------------+
| MPV                  | 9.3Comment:             | fl                | [facility] |
+----------------------+-------------------------+-------------------+-----------------+
| DIFF TYPE            | MANUAL                  |                   | [facility] |
+----------------------+-------------------------+-------------------+-----------------+
| Neutrophils Manual   | 64                      | %                 | [facility] |
+----------------------+-------------------------+-------------------+-----------------+
| Lymphocytes Manual   | 28                      | %                 | [facility] |
+----------------------+-------------------------+-------------------+-----------------+
| Monocytes Manual     | 8                       | %                 | [facility] |
+----------------------+-------------------------+-------------------+-----------------+
| Neutrophils Absolute | 3.54                    | 1.90 - 7.40 K/uL  | [facility] |
+----------------------+-------------------------+-------------------+-----------------+
| Lymphocytes Absolute | 1.55                    | 1.00 - 3.90 K/uL  | [facility] |
+----------------------+-------------------------+-------------------+-----------------+
| Monocytes Absolute   | 0.44                    | 0.00 - 0.80 K/uL  | [facility] |
+----------------------+-------------------------+-------------------+-----------------+
| MORPHOLOGY           | 1+Comment: ANISONORMAL  |                   | [facility] |
|                      | PLT MORPHTesting        |                   |                 |
|                      | performed at [facility]    |                   |                 |
|                      | [facility];[place], [place]  |                   |                 |
|                      | [id number]                   |                   |                 |
|                      |                         |                   |                 |
+----------------------+-------------------------+-------------------+-----------------+
 
 
 
+-------------------+
| Specimen          |
+-------------------+
| Blood - Arm, Left |
+-------------------+
 
 
 
+-------------------+------------------+--------------------+--------------+
| Performing        | Address          | City/State/Zipcode | Phone Number |
| Organization      |                  |                    |              |
+-------------------+------------------+--------------------+--------------+
 
|   [facility] |   [address] | [place] [address] |              |
+-------------------+------------------+--------------------+--------------+
 US lower extremty  arterial right (2019  2:23 PM)
 
+------------------------------------------------------------------------+--------------+
| Impressions                                                            | Performed At |
+------------------------------------------------------------------------+--------------+
|   1. Right leg shows biphasic inflow with a greater than 50% stenosis  |   [facility]     |
| at  the origin of the superficial femoral artery                       | RADIOLOGY    |
| (137-309).    Biphasic  outflow.  2. Two vessel runoff to the right    |              |
| foot.  Signed by: Eugenio Hoffman  Sign Date/Time: 2019 3:11 PM  |              |
+------------------------------------------------------------------------+--------------+
 
 
 
+------------------------------------------------------------------------+--------------+
| Narrative                                                              | Performed At |
+------------------------------------------------------------------------+--------------+
|   LOWER EXTREMITY ARTERIAL EXAM WITH IMAGING  CLINICAL INFORMATION:    |   [facility]     |
| The patient is a 68-year-old female presenting to the vascular lab     | RADIOLOGY    |
| with  complaints of right foot nonhealing wound.    We have been asked |              |
|  to  evaluate for peripheral arterial disease.  COMPARISON:  None      |              |
| PROCEDURE:  Imaging of the right lower extremity arteries was          |              |
| performed using both  color Doppler and spectral Doppler techniques    |              |
| with a 9 megahertz linear  array transducer.  FINDINGS:  RIGHT  CFA    |              |
| prox 137 biphasic  DFA prox 71 biphasic  SFA prox 309 biphasic  SFA    |              |
| mid 91 biphasic  SFA distal 127 biphasic  POP mid 96 biphasic  GIL     |              |
| prox 69 biphasic  GIL distal 145 biphasic  PTA prox 50 biphasic  PTA   |              |
| distal 58 biphasic  WINIFRED prox 74 biphasic  WINIFRED distal 0    absent     |              |
+------------------------------------------------------------------------+--------------+
 
 
 
+------------------------------------------------------------------------------------------+
| Procedure Note                                                                           |
+------------------------------------------------------------------------------------------+
|   Lauro Baldwin Results In - 2019  3:14 PM PST  LOWER EXTREMITY ARTERIAL EXAM WITH      |
| IMAGINGCLINICAL INFORMATION:The patient is a 68-year-old female presenting to the        |
| vascular lab withcomplaints of right foot nonhealing wound.  We have been asked          |
| toevaluate for peripheral arterial disease.COMPARISON:NonePROCEDURE:Imaging of the right |
|  lower extremity arteries was performed using bothcolor Doppler and spectral Doppler     |
| techniques with a 9 megahertz lineararray transducer.FINDINGS:RIGHTCFA prox 137          |
| biphasicDFA prox 71 biphasicSFA prox 309 biphasicSFA mid 91 biphasicSFA distal 127       |
| biphasicPOP mid 96 biphasicATA prox 69 biphasicATA distal 145 biphasicPTA prox 50        |
| biphasicPTA distal 58 biphasicPERA prox 74 biphasicPERA distal 0  absentIMPRESSION:1.    |
| Right leg shows biphasic inflow with a greater than 50% stenosis atthe origin of the     |
| superficial femoral artery (137-309).  Biphasicoutflow.2. Two vessel runoff to the right |
|  foot.Signed by: Jamilah Hoffman Date/Time: 2019 3:11 PM                       |
|RIGHT                                                                                    |
|CFA prox 137 biphasic                                                                     |
|DFA prox 71 biphasic                                                                      |
|SFA prox 309 biphasic                                                                     |
|SFA mid 91 biphasic                                                                       |
|SFA distal 127 biphasic                                                                   |
|POP mid 96 biphasic                                                                       |
|GIL prox 69 biphasic                                                                      |
|GIL distal 145 biphasic                                                                   |
|PTA prox 50 biphasic                                                                      |
|PTA distal 58 biphasic                                                                    |
|WINIFRED prox 74 biphasic                                                                     |
 
|WINIFRED distal 0  absent                                                                     |
|IMPRESSION:                                                                              |
|1. Right leg shows biphasic inflow with a greater than 50% stenosis at                    |
|the origin of the superficial femoral artery (137-309).  Biphasic                         |
|outflow.                                                                                 |
|2. Two vessel runoff to the right foot.                                                   |
|Signed by: Eugenio Hoffman                                                                |
|Sign Date/Time: 2019 3:11 PM                                                        |
+------------------------------------------------------------------------------------------+
 
 
 
+--------------------+------------------+--------------------+--------------+
| Performing         | Address          | City/State/Zipcode | Phone Number |
| Organization       |                  |                    |              |
+--------------------+------------------+--------------------+--------------+
|   [facility] |   [address] | [place] [address] |              |
+--------------------+------------------+--------------------+--------------+
 ED INFORMATION EXCHANGE (2019  1:03 PM)
 
+------------------------------------------------------------------------+--------------+
| Narrative                                                              | Performed At |
+------------------------------------------------------------------------+--------------+
|   [id number] [id number]     Upcoming Changes to the [facility]     |   ED         |
| Notification  On 2019 the layout of this notification will | INFORMATION  |
|  be updated. For an overview of upcoming changes, please log into      | EXCHANGE     |
| [email or web address]. For questions,       |              |
| please email [email or web address] or call [phone].     |              |
|  This patient has registered at the [facility]     |              |
| Emergency Department   For more information visit:                     |              |
| [email or web address] |              |
| [id number]   Security Events  No recent Security Events currently on file  |              |
|     ED Care Guidelines from [facility]  Last Updated: 18 |              |
|  10:16 AM         Other Information:  Currently being seen by Myranda |              |
|  in [place] for mental health concerns.[phone]  These are       |              |
| guidelines and the provider should exercise clinical judgment when     |              |
| providing care.  Additional guidelines are also available online from  |              |
| the following facilities: [facility]   Recent Emergency      |              |
| Department Visit Summary  Date Facility City State Type Major Type     |              |
| Diagnoses or Chief Complaint   2019 [facility]       |              |
| [facility]. [place] Emergency    Emergency       2019 [facility]    |              |
| [facility]. [place] Emergency    Emergency          Chest Pain              |              |
| Nausea        Shortness of Breath        Chest pain, unspecified       |              |
|      Elevated blood-pressure reading, without diagnosis of             |              |
| hypertension        Presence of aortocoronary bypass graft             |              |
| Other specified postprocedural states      2019 [facility]  |              |
| Health [place]. [place] Emergency    Emergency          CHEST PAIN            |              |
| Abnormal levels of other serum enzymes        Personal history of      |              |
| other diseases of the circulatory system        Chest pain,            |              |
| unspecified      2019 [facility] [place]            |              |
| Emergency    Emergency          FISTULA BLEEDING        Hemorrhage due |              |
|  to vascular prosthetic devices, implants and grafts, initial          |              |
| encounter      Dec 22, 2018 [facility] [place]             |              |
| Emergency    Emergency          CHEST PAIN        Type 2 diabetes      |              |
| mellitus with hyperglycemia        Chest pain, unspecified      Nov    |              |
| 2018 [place] [place] Emergency    Emergency    Chief |              |
|  Complaint: SOB      2018 [facility] [place]       |              |
| Emergency    Emergency          FALL        Unspecified fall, initial  |              |
| encounter        Dependence on renal dialysis        End stage renal   |              |
| disease      2018 [facility] [place]               |              |
 
| Emergency    Emergency          FALL        Essential (primary)        |              |
| hypertension        Type 2 diabetes mellitus with hyperglycemia        |              |
|  Type 2 diabetes mellitus with unspecified complications               |              |
| Unspecified fall, initial encounter        Headache      2018  |              |
| [place]. [place] Emergency    Emergency          -1.      |              |
| Dorsalgia, unspecified        -1. Painful micturition, unspecified     |              |
|      0. Frequency of micturition        1. Urinary tract infection,    |              |
| site not specified        6. Type 2 diabetes mellitus with             |              |
| hyperglycemia        7. Type 2 diabetes mellitus with diabetic chronic |              |
|  kidney disease        8. End stage renal disease        9. Dependence |              |
|  on renal dialysis        10. Shortness of breath        11. Long term |              |
|  (current) use of anticoagulants            E.D. Visit Count (12 mo.)  |              |
|  Facility Visits Low Acuity   [place] [phone] 0   [facility]        |              |
| [facility] 2 0   [facility] 3 0   Total   |              |
| 22 0   Note: Visits indicate total known visits. Medicaid Low Acuity   |              |
| Dx are the number of primary diagnoses on the Medicaid's Low Acuity dx |              |
|  list.         Recent Inpatient Visit Summary  Date Facility City      |              |
| State Type Major Type Diagnoses or Chief Complaint   Dec 27, 2018      |              |
| [facility] [place] Recovery    Inpatient                   |              |
|     Peripheral arterial disease, osteomyelitis left foot        Other  |              |
| acute osteomyelitis, left ankle and foot        Long term (current)    |              |
| use of antibiotics        End stage renal disease        Anemia in     |              |
| chronic kidney disease      Dec 5, 2018 [facility] |              |
|  General Medicine    Inpatient          Peripheral vascular disease,   |              |
| unspecified        End stage renal disease        Dependence on renal  |              |
| dialysis        Long term (current) use of insulin        Other        |              |
| chronic osteomyelitis, left ankle and foot        Type 2 diabetes      |              |
| mellitus with diabetic chronic kidney disease        Essential         |              |
| (primary) hypertension      2018 [facility] [place]   |              |
| [place] Inpatient    Inpatient          Upper GIB        Other chronic      |              |
| osteomyelitis, unspecified ankle and foot        End stage renal       |              |
| disease        Other specified personal risk factors, not elsewhere    |              |
| classified        Chronic systolic (congestive) heart failure          |              |
| Anemia in chronic kidney disease        Other disorders of             |              |
| plasma-protein metabolism, not elsewhere classified        Moderate    |              |
| protein-calorie malnutrition        Other disorders of phosphorus      |              |
| metabolism      [facility] [place] [place] [facility]     |              |
| Surgical    Inpatient          1. Type 2 diabetes mellitus with other  |              |
| specified complication        2. Urinary tract infection, site not     |              |
| specified        3. Unspecified protein-calorie malnutrition        4. |              |
|  Other chronic osteomyelitis, unspecified site        5. Hypertensive  |              |
| heart and chronic kidney disease with heart failure and with stage 5   |              |
| chronic kidney disease, or end stage renal disease        6. Chronic   |              |
| diastolic (congestive) heart failure        7. Other osteomyelitis,    |              |
| ankle and foot        8. Type 2 diabetes mellitus with foot ulcer      |              |
|      9. Atherosclerotic heart disease of native coronary artery        |              |
| without angina pectoris        10. Chronic obstructive pulmonary       |              |
| disease, unspecified      2018 [facility] [place]. [place]    |              |
| Medical Surgical    Inpatient          1. Benign paroxysmal vertigo,   |              |
| unspecified ear        2. End stage renal disease        3.            |              |
| Hypertensive chronic kidney disease with stage 5 chronic kidney        |              |
| disease or end stage renal disease        4. Urinary tract infection,  |              |
| site not specified        5. Hypertensive heart and chronic kidney     |              |
| disease with heart failure and with stage 5 chronic kidney disease, or |              |
|  end stage renal disease        6. Kidney transplant status        7.  |              |
| Unspecified systolic (congestive) heart failure        8. Syncope and  |              |
| collapse        9. Type 2 diabetes mellitus with diabetic chronic      |              |
| kidney disease        10. Atherosclerotic heart disease of native      |              |
| coronary artery without angina pectoris            Prescription Drug   |              |
| Report (12 Mo.)  PDMP query found no report.     Care Providers        |              |
 
| Provider PRC Type Phone Fax Service Dates   HEADINGS, SALEEM HENDERSONN.P.     |              |
| Nurse Practitioner: Family     Current      Marco Antonio Martinez Case        |              |
| Manager/Care Coordinator [phone]    2019 - Current       |              |
| Marco Antonio Martinez Primary Care [phone]    2019 -            |              |
| Current      [facility] Primary Care    ([phone])       |              |
| [phone] Current      [facility] Primary     |              |
| Care     Current      MANOLO GONZALEZ Primary Care     Current            |              |
| [facility] Provider [phone]     |              |
| Current      [facility] Case or Care Manager     Current      Willard   |              |
| MIRTA Sr Case or Care Manager [phone]     |              |
| [phone] Current      Jairo Gutierrez MD Other     Current           |              |
| Known Aliases  No known aliases.   Criteria Met         Care           |              |
| Guidelines      10 in 12      3 in 60     The above information is     |              |
| provided for the sole purpose of patient treatment. Use of this        |              |
| information beyond the terms of Data Sharing Memorandum of             |              |
| Understanding and License Agreement is prohibited. In certain cases    |              |
| not all visits may be represented.   Consult the aforementioned        |              |
| facilities for additional information.   2019 [facility]       |              |
| [place]. [place], [place] -                              |              |
| [email or web address]                                         |              |
+------------------------------------------------------------------------+--------------+
 
 
 
+-------------------------------------------------------------------------------------------
--------------------------------------------------------------------------------------------
--------------------+
| Procedure Note                                                                            
                                                                                            
                    |
+-------------------------------------------------------------------------------------------
--------------------------------------------------------------------------------------------
--------------------+
|   Interface, Lab - 2019  1:04 PM PST  Formatting of this note may be different      
                                                                                            
                    |
| from the original.[id number] Changes to the Arnie             
                                                                                            
                    |
| NotificationOn 2019 the layout of this notification will be updated. For an   
                                                                                            
                    |
| overview of upcoming changes, please log into                                             
                                                                                            
                    |
| [email or web address]. For questions, please email             
                                                                                            
                    |
| [email or web address] or call [phone].This patient has registered at the   
                                                                                            
                    |
| [facility] Emergency Department For more information visit:           
                                                                                            
                    |
| [email or web address] Security     
                                                                                            
                    |
| EventsNo recent Security Events currently on fileED Care Guidelines from [employer] -       
                                                                                            
                    |
 
| Antoinette Updated: 18 10:16 AM  Other Information:Currently being seen by         
                                                                                            
                    |
| [facility] in [place] for mental health concerns.[phone] are guidelines and     
                                                                                            
                    |
| the provider should exercise clinical judgment when providing care.Additional guidelines  
                                                                                            
                    |
|  are also available online from the following facilities: [facility] Recent     
                                                                                            
                    |
| Emergency Department Visit SummaryDate Facility [facility] State Type Major Type Diagnoses or   
                                                                                            
                    |
| Chief Complaint 2019 [facility] [place]. [place] Emergency  Emergency   [place]    
                                                                                            
                    |
| 2019 [facility] [place]. [place] Emergency  Emergency    Chest Pain    Nausea      
                                                                                            
                    |
| Shortness of Breath    Chest pain, unspecified    Elevated blood-pressure reading,        
                                                                                            
                    |
| without diagnosis of hypertension    Presence of aortocoronary bypass graft    Other      
                                                                                            
                    |
| specified postprocedural states  2019 [facility] [place]. [place] Emergency    
                                                                                            
                    |
| Emergency    CHEST PAIN    Abnormal levels of other serum enzymes    Personal history of  
                                                                                            
                    |
|  other diseases of the circulatory system    Chest pain, unspecified  2019 [facility]    
                                                                                            
                    |
| [facility] [place]. [place] Emergency  Emergency    FISTULA BLEEDING    Hemorrhage due to   
                                                                                            
                    |
| vascular prosthetic devices, implants and grafts, initial encounter  Dec 22, 2018 [facility]    
                                                                                            
                    |
| [facility] [place]. [place] Emergency  Emergency    CHEST PAIN    Type 2 diabetes mellitus  
                                                                                            
                    |
|  with hyperglycemia    Chest pain, unspecified  [facility] [place]   
                                                                                            
                    |
| [place] Emergency  Emergency  Chief Complaint: SOB  2018 [facility].   
                                                                                            
                    |
| [place] Emergency  Emergency    FALL    Unspecified fall, initial encounter    Dependence on   
                                                                                            
                    |
| renal dialysis    End stage renal disease  2018 [facility]. [place]    
                                                                                            
                    |
| Emergency  Emergency    FALL    Essential (primary) hypertension    Type 2 diabetes       
                                                                                            
                    |
 
| mellitus with hyperglycemia    Type 2 diabetes mellitus with unspecified complications    
                                                                                            
                    |
|   Unspecified fall, initial encounter    Headache  2018 [facility] [place]       
                                                                                            
                    |
| [place]. [place] Emergency  Emergency    -1. Dorsalgia, unspecified    -1. Painful micturition,  
                                                                                            
                    |
|  unspecified    0. Frequency of micturition    1. Urinary tract infection, site not       
                                                                                            
                    |
| specified    6. Type 2 diabetes mellitus with hyperglycemia    7. Type 2 diabetes         
                                                                                            
                    |
| mellitus with diabetic chronic kidney disease    8. End stage renal disease    9.         
                                                                                            
                    |
| Dependence on renal dialysis    10. Shortness of breath    11. Long term (current) use    
                                                                                            
                    |
| of anticoagulants  E.D. Visit Count (12 mo.)Facility Visits [facility]      
                                                                                            
                    |
| Health 17 0 [facility] 2 0 [facility] 3 0 Total 22 0   
                                                                                            
                    |
| Note: Visits indicate total known visits. Medicaid Low Acuity Dx are the number of        
                                                                                            
                    |
| primary diagnoses on the Medicaid's Low Acuity dx list.  Recent Inpatient Visit           
                                                                                            
                    |
| SummaryDate Facility [facility] State Type Major Type Diagnoses or Chief Complaint Dec 27,      
                                                                                            
                    |
|  [facility] [place]. [place] Recovery  Inpatient    Peripheral arterial disease,   
                                                                                            
                    |
| osteomyelitis left foot    Other acute osteomyelitis, left ankle and foot    Long term    
                                                                                            
                    |
| (current) use of antibiotics    End stage renal disease    Anemia in chronic kidney       
                                                                                            
                    |
| disease  Dec 5, 2018 [facility] [place]. [place] General Medicine  Inpatient           
                                                                                            
                    |
| Peripheral vascular disease, unspecified    End stage renal disease    Dependence on      
                                                                                            
                    |
| renal dialysis    Long term (current) use of insulin    Other chronic osteomyelitis,      
                                                                                            
                    |
| left ankle and foot    Type 2 diabetes mellitus with diabetic chronic kidney disease      
                                                                                            
                    |
| Essential (primary) hypertension  2018 [facility] [place]. [place] Inpatient   
                                                                                            
                    |
 
|  Inpatient    Upper GIB    Other chronic osteomyelitis, unspecified ankle and foot        
                                                                                            
                    |
| End stage renal disease    Other specified personal risk factors, not elsewhere           
                                                                                            
                    |
| classified    Chronic systolic (congestive) heart failure    Anemia in chronic kidney     
                                                                                            
                    |
| disease    Other disorders of plasma-protein metabolism, not elsewhere classified         
                                                                                            
                    |
| Moderate protein-calorie malnutrition    Other disorders of phosphorus metabolism  Nov    
                                                                                            
                    |
| 2018 [facility] [place]. [place] Medical Surgical  Inpatient    1. Type 2 diabetes  
                                                                                            
                    |
|  mellitus with other specified complication    2. Urinary tract infection, site not       
                                                                                            
                    |
| specified    3. Unspecified protein-calorie malnutrition    4. Other chronic              
                                                                                            
                    |
| osteomyelitis, unspecified site    5. Hypertensive heart and chronic kidney disease with  
                                                                                            
                    |
|  heart failure and with stage 5 chronic kidney disease, or end stage renal disease    6.  
                                                                                            
                    |
|  Chronic diastolic (congestive) heart failure    7. Other osteomyelitis, ankle and foot   
                                                                                            
                    |
|    8. Type 2 diabetes mellitus with foot ulcer    9. Atherosclerotic heart disease of     
                                                                                            
                    |
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 Chronic obstructive pulmonary       
                                                                                            
                    |
| disease, unspecified  2018 [facility] [place]. [place] Medical Surgical          
                                                                                            
                    |
| Inpatient    1. Benign paroxysmal vertigo, unspecified ear    2. End stage renal disease  
                                                                                            
                    |
|     3. Hypertensive chronic kidney disease with stage 5 chronic kidney disease or end     
                                                                                            
                    |
| stage renal disease    4. Urinary tract infection, site not specified    5. Hypertensive  
                                                                                            
                    |
|  heart and chronic kidney disease with heart failure and with stage 5 chronic kidney      
                                                                                            
                    |
| disease, or end stage renal disease    6. Kidney transplant status    7. Unspecified      
                                                                                            
                    |
| systolic (congestive) heart failure    8. Syncope and collapse    9. Type 2 diabetes      
                                                                                            
                    |
 
| mellitus with diabetic chronic kidney disease    10. Atherosclerotic heart disease of     
                                                                                            
                    |
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     
                                                                                            
                    |
| query found no report.[facility] Type Phone Fax Service Dates HEADINGS,   
                                                                                            
                    |
| ERNST HENDERSNOP. Nurse Practitioner: Family   Current  Marco Antonio Martinez /Care       
                                                                                            
                    |
| Coordinator [phone]  2019 - Current  [facility] ([phone])      
                                                                                            
                    |
| [phone]  2019 - Current  [facility]  ([phone])        
                                                                                            
                    |
| [phone] Current  [facility] Primary [facility]   Current  MANOLO      
                                                                                            
                    |
| CRUZINDIRA Primary Care   Current  [facility] [phone]  
                                                                                            
                    |
|  [phone] Current  [facility] Case or Care Manager   Current  Willard Crook -             
                                                                                            
                    |
| MIRTA Ortiz Case or Care Manager [phone] Current  Brenda,        
                                                                                            
                    |
| Jairo HARRIS MD Other   Current  Known AliasesNo known aliases. Criteria Met  Care          
                                                                                            
                    |
| Guidelines  10 in 12  3 in 60The above information is provided for the sole purpose of    
                                                                                            
                    |
| patient treatment. Use of this information beyond the terms of Data Sharing Memorandum    
                                                                                            
                    |
| of Understanding and License Agreement is prohibited. In certain cases not all visits     
                                                                                            
                    |
| may be represented. Consult the aforementioned facilities for additional information.     
                                                                                            
                    |
|  [employer]. [place], [place] -                         
                                                                                            
                    |
| info@"[email or web address]                                                            
                                                                                            
                    |
|  Peripheral arterial disease, osteomyelitis left foot                                     
                                                                                            
                    |
|   Other acute osteomyelitis, left ankle and foot                                          
                                                                                            
                    |
|   Long term (current) use of antibiotics                                                  
                                                                                            
                    |
 
|   End stage renal disease                                                                 
                                                                                            
                    |
|   Anemia in chronic kidney disease                                                        
                                                                                            
                    |
|                                                                                           
                                                                                            
                    |
|Dec 5, 2018 [facility] [place]. [place] General Medicine  Inpatient                     
                                                                                            
                    |
|  Peripheral vascular disease, unspecified                                                 
                                                                                            
                    |
|   End stage renal disease                                                                 
                                                                                            
                    |
|   Dependence on renal dialysis                                                            
                                                                                            
                    |
|   Long term (current) use of insulin                                                      
                                                                                            
                    |
|   Other chronic osteomyelitis, left ankle and foot                                        
                                                                                            
                    |
|   Type 2 diabetes mellitus with diabetic chronic kidney disease                           
                                                                                            
                    |
|   Essential (primary) hypertension                                                        
                                                                                            
                    |
|                                                                                           
                                                                                            
                    |
|[facility] [place] Inpatient  Inpatient                           
                                                                                            
                    |
|  Upper GIB                                                                                
                                                                                            
                    |
|   Other chronic osteomyelitis, unspecified ankle and foot                                 
                                                                                            
                    |
|   End stage renal disease                                                                 
                                                                                            
                    |
|   Other specified personal risk factors, not elsewhere classified                         
                                                                                            
                    |
|   Chronic systolic (congestive) heart failure                                             
                                                                                            
                    |
|   Anemia in chronic kidney disease                                                        
                                                                                            
                    |
|   Other disorders of plasma-protein metabolism, not elsewhere classified                  
                                                                                            
                    |
 
|   Moderate protein-calorie malnutrition                                                   
                                                                                            
                    |
|   Other disorders of phosphorus metabolism                                                
                                                                                            
                    |
|                                                                                           
                                                                                            
                    |
|2018 [place]. [place] Medical Surgical  Inpatient                     
                                                                                            
                    |
|  1. Type 2 diabetes mellitus with other specified complication                            
                                                                                            
                    |
|   2. Urinary tract infection, site not specified                                          
                                                                                            
                    |
|   3. Unspecified protein-calorie malnutrition                                             
                                                                                            
                    |
|   4. Other chronic osteomyelitis, unspecified site                                        
                                                                                            
                    |
|   5. 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
onic kidney disease, or end stage renal disease                                             
                    |
|   6. Chronic diastolic (congestive) heart failure                                         
                                                                                            
                    |
|   7. Other osteomyelitis, ankle and foot                                                  
                                                                                            
                    |
|   8. Type 2 diabetes mellitus with foot ulcer                                             
                                                                                            
                    |
|   9. Atherosclerotic heart disease of native coronary artery without angina pectoris      
                                                                                            
                    |
|   10. Chronic obstructive pulmonary disease, unspecified                                  
                                                                                            
                    |
|                                                                                           
                                                                                            
                    |
|2018 [facility] [place] [facility]  Inpatient                      
                                                                                            
                    |
|  1. Benign paroxysmal vertigo, unspecified ear                                            
                                                                                            
                    |
|   2. End stage renal disease                                                              
                                                                                            
                    |
|   3. Hypertensive chronic kidney disease with stage 5 chronic kidney disease or end stage 
renal disease                                                                               
                    |
|   4. Urinary tract infection, site not specified                                          
                                                                                            
                    |
 
|   5. 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
onic kidney disease, or end stage renal disease                                             
                    |
|   6. Kidney transplant status                                                             
                                                                                            
                    |
|   7. Unspecified systolic (congestive) heart failure                                      
                                                                                            
                    |
|   8. Syncope and collapse                                                                 
                                                                                            
                    |
|   9. Type 2 diabetes mellitus with diabetic chronic kidney disease                        
                                                                                            
                    |
|   10. Atherosclerotic heart disease of native coronary artery without angina pectoris     
                                                                                            
                    |
|                                                                                           
                                                                                            
                    |
|                                                                                           
                                                                                            
                    |
|                                                                                           
                                                                                            
                    |
|Prescription Drug Report (12 Mo.)                                                          
                                                                                            
                    |
|PDMP query found no report.                                                                
                                                                                            
                    |
|                                                                                           
                                                                                            
                    |
|Care Providers                                                                             
                                                                                            
                    |
|Provider PRC Type Phone Fax Service Dates                                                  
                                                                                            
                    |
|HEADINGS, SANTIAGO, F.N.P. Nurse Practitioner: Family   Current                                
                                                                                            
                    |
|[facility] /Care Coordinator [phone]  2019 - Current         
                                                                                            
                    |
|[facility] [phone]  2019 - Current                          
                                                                                            
                    |
|[facility]  [phone] Current                         
                                                                                            
                    |
|[facility]   Current                                
                                                                                            
                    |
|[facility] Primary Care   Current                                                        
                                                                                            
                    |
 
|[facility] [phone] Current                 
                                                                                            
                    |
|[facility] Case or Care Manager   Current                                                   
                                                                                            
                    |
|MIRTA Goel Case or Care Manager [phone] Current
                                                                                            
                    |
|Jairo Gutierrez MD Other   Current                                                       
                                                                                            
                    |
|                                                                                           
                                                                                            
                    |
|Known Aliases                                                                              
                                                                                            
                    |
|No known aliases.                                                                          
                                                                                            
                    |
|Criteria Met                                                                               
                                                                                            
                    |
|                                                                                           
                                                                                            
                    |
|  Care Guidelines                                                                          
                                                                                            
                    |
|  10 in 12                                                                                 
                                                                                            
                    |
|  3 in 60                                                                                  
                                                                                            
                    |
|                                                                                           
                                                                                            
                    |
|The above information is provided for the sole purpose of patient treatment. Use of this in
formation beyond the terms of Data Sharing Memorandum of Understanding and License Agreement
 is prohibited. In  |
|certain cases not all visits may be represented. Consult the aforementioned facilities for 
additional information.                                                                     
                    |
|2019 [employer] - [place], [place] - [email or web address]                                                                                       
                    |
+-------------------------------------------------------------------------------------------
--------------------------------------------------------------------------------------------
--------------------+
 
 
 
+-------------------+---------+--------------------+--------------+
| Performing        | Address | City/State/Zipcode | Phone Number |
| Organization      |         |                    |              |
+-------------------+---------+--------------------+--------------+
|   ED INFORMATION  |         |                    |              |
| EXCHANGE          |         |                    |              |
 
+-------------------+---------+--------------------+--------------+
 in this encounter
 
 Visit Diagnoses
 
 
+-----------------------------------------------------------------------------------+
| Diagnosis                                                                         |
+-----------------------------------------------------------------------------------+
|   PVD (peripheral vascular disease) (HCC) - Primary                               |
+-----------------------------------------------------------------------------------+
|   Peripheral vascular disease, unspecified                                        |
+-----------------------------------------------------------------------------------+
|   ESRD (end stage renal disease) on dialysis (HCC)                                |
+-----------------------------------------------------------------------------------+
|   End stage renal disease                                                         |
+-----------------------------------------------------------------------------------+
|   Anemia in ESRD (end-stage renal disease) (HCC)                                  |
+-----------------------------------------------------------------------------------+
|   Anemia in chronic kidney disease                                                |
+-----------------------------------------------------------------------------------+
|   Hypoalbuminemia                                                                 |
+-----------------------------------------------------------------------------------+
|   Other disorders of plasma protein metabolism                                    |
+-----------------------------------------------------------------------------------+
|   Electrolyte imbalance risk                                                      |
+-----------------------------------------------------------------------------------+
|   Other specified conditions influencing health status                            |
+-----------------------------------------------------------------------------------+
|   ESRD (end stage renal disease) (HCC)                                            |
+-----------------------------------------------------------------------------------+
|   End stage renal disease                                                         |
+-----------------------------------------------------------------------------------+
|   CAD (coronary artery disease)                                                   |
+-----------------------------------------------------------------------------------+
|   Coronary atherosclerosis of unspecified type of vessel, native or graft         |
+-----------------------------------------------------------------------------------+
|   Paroxysmal atrial fibrillation (HCC)                                            |
+-----------------------------------------------------------------------------------+
|   Atrial fibrillation                                                             |
+-----------------------------------------------------------------------------------+
|   COPD (chronic obstructive pulmonary disease) (HCC)                              |
+-----------------------------------------------------------------------------------+
|   Chronic airway obstruction, not elsewhere classified                            |
+-----------------------------------------------------------------------------------+
|   Chronic systolic congestive heart failure (HCC)                                 |
+-----------------------------------------------------------------------------------+
|   Chronic systolic heart failure                                                  |
+-----------------------------------------------------------------------------------+
|   Hypertension, essential                                                         |
+-----------------------------------------------------------------------------------+
|   Unspecified essential hypertension                                              |
+-----------------------------------------------------------------------------------+
|   Type 2 diabetes mellitus with chronic kidney disease on chronic dialysis, with  |
| long-term current use of insulin (HCC)                                            |
+-----------------------------------------------------------------------------------+
 
 
 
 Admitting Diagnoses
 
 
 
+----------------------------------------------------+
| Diagnosis                                          |
+----------------------------------------------------+
|   PVD (peripheral vascular disease) (HCC)          |
+----------------------------------------------------+
|   Peripheral vascular disease, unspecified         |
+----------------------------------------------------+
|   ESRD (end stage renal disease) on dialysis (HCC) |
+----------------------------------------------------+
|   End stage renal disease                          |
+----------------------------------------------------+
 
 
 
 Administered Medications
 
 
+------------------+--------+---------+------+------+------+
| Medication Order | MAR    | Action  | Dose | Rate | Site |
|                  | Action | Date    |      |      |      |
+------------------+--------+---------+------+------+------+
 
 
 
+-----------------------------------+---+
|   acetaminophen (TYLENOL)         |   |
| suppository 650 mg  650 mg,       |   |
| Rectal, Every 6 Hours PRN, Mild   |   |
| Pain (1-3), Fever, Starting Wed   |   |
| 19 at 1910                   |   |
+-----------------------------------+---+
|                                   |   |
+-----------------------------------+---+
|   acetaminophen (TYLENOL) tablet  |   |
| 650 mg  650 mg, Oral, Every 6     |   |
| Hours PRN, Mild Pain (1-3),       |   |
| Fever, Starting Wed 19 at    |   |
| 1910                              |   |
+-----------------------------------+---+
|                                   |   |
+-----------------------------------+---+
 
 
 
+-----------------------------------+-------+----------+--------+---+---+
|   apixaban (ELIQUIS) tablet 2.5   | Given |  | 2.5 mg |   |   |
| mg  2.5 mg, Oral, 2 Times Daily,  |       | 9 14:01  |        |   |   |
| First dose on Thu 19 at 1200 |       | PST      |        |   |   |
+-----------------------------------+-------+----------+--------+---+---+
 
 
 
+-------+----------+--------+---+---+
| Given |  | 2.5 mg |   |   |
|       | 9 20:51  |        |   |   |
|       | PST      |        |   |   |
+-------+----------+--------+---+---+
| Given |  | 2.5 mg |   |   |
 
|       | 9 08:18  |        |   |   |
|       | PST      |        |   |   |
+-------+----------+--------+---+---+
 
 
 
+---+---+
|   |   |
+---+---+
 
 
 
+-----------------------------------+-------+----------+-------+---+---+
|   aspirin chewable tablet 81 mg   | Given |  | 81 mg |   |   |
| 81 mg, Oral, Daily With           |       | 9 10:01  |       |   |   |
| Breakfast, First dose on Fri      |       | PST      |       |   |   |
| 19 at 0930                   |       |          |       |   |   |
+-----------------------------------+-------+----------+-------+---+---+
 
 
 
+---+---+
|   |   |
+---+---+
 
 
 
+-----------------------------------+-------+----------+-------+---+---+
|   aspirin EC tablet 81 mg  81 mg, | Given |  | 81 mg |   |   |
|  Oral, Daily With Breakfast,      |       | 9 14:00  |       |   |   |
| First dose on Thu 19 at 0800 |       | PST      |       |   |   |
+-----------------------------------+-------+----------+-------+---+---+
 
 
 
+-------+----------+-------+---+---+
| Given |  | 81 mg |   |   |
|       | 9 08:18  |       |   |   |
|       | PST      |       |   |   |
+-------+----------+-------+---+---+
 
 
 
+---+---+
|   |   |
+---+---+
 
 
 
+-----------------------------------+-------+----------+-------+---+---+
|   atorvastatin (LIPITOR) tablet   | Given |  | 40 mg |   |   |
| 40 mg  40 mg, Oral, Nightly,      |       | 9 21:59  |       |   |   |
| First dose on 19 at 2200 |       | PST      |       |   |   |
+-----------------------------------+-------+----------+-------+---+---+
 
 
 
+-------+----------+-------+---+---+
| Given |  | 40 mg |   |   |
|       | 9 20:51  |       |   |   |
 
|       | PST      |       |   |   |
+-------+----------+-------+---+---+
 
 
 
+---+---+
|   |   |
+---+---+
 
 
 
+----------------------------------+-------+----------+--------+---+---+
|   calcium acetate (PHOSLO)       | Given |  | 667 mg |   |   |
| capsule 667 mg  667 mg, Oral, 3  |       | 9 17:07  |        |   |   |
| Times Daily With Meals, First    |       | PST      |        |   |   |
| dose on Wed 19 at 1930      |       |          |        |   |   |
+----------------------------------+-------+----------+--------+---+---+
 
 
 
+-------+----------+--------+---+---+
| Given |  | 667 mg |   |   |
|       | 9 08:17  |        |   |   |
|       | PST      |        |   |   |
+-------+----------+--------+---+---+
| Given |  | 667 mg |   |   |
|       | 9 12:33  |        |   |   |
|       | PST      |        |   |   |
+-------+----------+--------+---+---+
 
 
 
+---+---+
|   |   |
+---+---+
 
 
 
+-----------------------------------+-------+----------+---------+---+---+
|   carvedilol (COREG) tablet 12.5  | Given |  | 12.5 mg |   |   |
| mg  12.5 mg, Oral, 2 Times Daily  |       | 9 21:59  |         |   |   |
| With Meals, First dose on Wed     |       | PST      |         |   |   |
| 19 at 1930                   |       |          |         |   |   |
+-----------------------------------+-------+----------+---------+---+---+
 
 
 
+-------+----------+---------+---+---+
| Given |  | 12.5 mg |   |   |
|       | 9 17:07  |         |   |   |
|       | PST      |         |   |   |
+-------+----------+---------+---+---+
| Given |  | 12.5 mg |   |   |
|       | 9 08:17  |         |   |   |
|       | PST      |         |   |   |
+-------+----------+---------+---+---+
 
 
 
+-----------------------------------+---+
 
|                                   |   |
+-----------------------------------+---+
|   cefTAZidime (FORTAZ) 2 g in     |   |
| sodium chloride (IV) 0.9 % 50 mL  |   |
| IVPB  2 g, Intravenous,           |   |
| Administer over 30 Minutes, After |   |
|  Hemodialysis (see admin          |   |
| instructions), Starting Wed       |   |
| 19 at 1327                   |   |
+-----------------------------------+---+
|                                   |   |
+-----------------------------------+---+
 
 
 
+-----------------------------------+-------+----------+-----+-------+---+
|   cefTAZidime (FORTAZ) 2 g in     | Given |  | 2 g | 100   |   |
| sodium chloride (IV) 0.9 % 50 mL  |       | 9 20:55  |     | mL/hr |   |
| IVPB  2 g, Intravenous,           |       | PST      |     |       |   |
| Administer over 30 Minutes, Once, |       |          |     |       |   |
|  Wed 19 at 1930, For 1 dose  |       |          |     |       |   |
+-----------------------------------+-------+----------+-----+-------+---+
 
 
 
+---+---+
|   |   |
+---+---+
 
 
 
+-----------------------------------+-------+----------+--------+---+---+
|   cholecalciferol (VITAMIN D-3)   | Given |  | 5,000  |   |   |
| tablet 5,000 Units  5,000 Units,  |       | 9 21:58  | Units  |   |   |
| Oral, Daily, First dose on Wed    |       | PST      |        |   |   |
| 19 at 1930                   |       |          |        |   |   |
+-----------------------------------+-------+----------+--------+---+---+
 
 
 
+-------+----------+--------+---+---+
| Given |  | 5,000  |   |   |
|       | 9 14:00  | Units  |   |   |
|       | PST      |        |   |   |
+-------+----------+--------+---+---+
| Given |  | 5,000  |   |   |
|       | 9 08:18  | Units  |   |   |
|       | PST      |        |   |   |
+-------+----------+--------+---+---+
 
 
 
+---+---+
|   |   |
+---+---+
 
 
 
+-----------------------------------+-------+----------+-------+---+---+
|   clopidogrel (PLAVIX) tablet 75  | Given |  | 75 mg |   |   |
 
| mg  75 mg, Oral, Daily, First     |       | 9 14:01  |       |   |   |
| dose on u 19 at 0900       |       | PST      |       |   |   |
+-----------------------------------+-------+----------+-------+---+---+
 
 
 
+-------+----------+-------+---+---+
| Given |  | 75 mg |   |   |
|       | 9 08:17  |       |   |   |
|       | PST      |       |   |   |
+-------+----------+-------+---+---+
 
 
 
+---+---+
|   |   |
+---+---+
 
 
 
+----------------------------------+-------+----------+---------+---+---+
|   diphenhydrAMINE (BENADRYL)     | Given |  | 12.5 mg |   |   |
| injection 12.5 mg  12.5 mg,      |       | 9 09:56  |         |   |   |
| Intravenous, Every 6 Hours PRN,  |       | PST      |         |   |   |
| Allergic Reaction, Itching,      |       |          |         |   |   |
| Starting Fri 19 at 0842     |       |          |         |   |   |
+----------------------------------+-------+----------+---------+---+---+
 
 
 
+---+---+
|   |   |
+---+---+
 
 
 
+---------------------------------+-------+----------+---------+---+---+
|   epoetin byron (PROCRIT)        | Given |  | 10,000  |   |   |
| injection 10,000 Units  10,000  |       | 9 10:07  | Units   |   |   |
| Units, Intravenous, Once In     |       | PST      |         |   |   |
| Dialysis, Thu 19 at 0900,  |       |          |         |   |   |
| For 1 dose, DIALYSIS            |       |          |         |   |   |
+---------------------------------+-------+----------+---------+---+---+
 
 
 
+---+---+
|   |   |
+---+---+
 
 
 
+----------------------------------+-------+----------+-------+---+---+
|   famotidine (PEPCID) tablet 10  | Given |  | 10 mg |   |   |
| mg  10 mg, Oral, Daily, First    |       | 9 22:00  |       |   |   |
| dose on Wed 19 at 1930      |       | PST      |       |   |   |
+----------------------------------+-------+----------+-------+---+---+
 
 
 
 
+-------+----------+-------+---+---+
| Given |  | 10 mg |   |   |
|       | 9 14:01  |       |   |   |
|       | PST      |       |   |   |
+-------+----------+-------+---+---+
| Given |  | 10 mg |   |   |
|       | 9 08:17  |       |   |   |
|       | PST      |       |   |   |
+-------+----------+-------+---+---+
 
 
 
+-----------------------------------+---+
|                                   |   |
+-----------------------------------+---+
|   fentaNYL (SUBLIMAZE) injection  |   |
| 25 mcg  25 mcg, Intravenous,      |   |
| Every 1 Hour PRN, Moderate Pain   |   |
| (4-6), Starting 19 at    |   |
| 1910                              |   |
+-----------------------------------+---+
|                                   |   |
+-----------------------------------+---+
|   fentaNYL (SUBLIMAZE) injection  |   |
| 50 mcg  50 mcg, Intravenous,      |   |
| Every 1 Hour PRN, Severe Pain     |   |
| (7-10), Starting 19 at   |   |
| 1910                              |   |
+-----------------------------------+---+
|                                   |   |
+-----------------------------------+---+
 
 
 
+-----------------------------------+-------+----------+--------+---+---+
|   fentaNYL (SUBLIMAZE) injection  | Given |  | 50 mcg |   |   |
|  Once PRN, Starting 19   |       | 9 18:00  |        |   |   |
| at 1800, Intra-procedure          |       | PST      |        |   |   |
| (CATH/IR)                         |       |          |        |   |   |
+-----------------------------------+-------+----------+--------+---+---+
 
 
 
+---+---+
|   |   |
+---+---+
 
 
 
+-----------------------------------+-------+----------+--------+---+---+
|   heparin (porcine) 1000 UNIT/ML  | Given |  | 5,000  |   |   |
| injection  Once PRN, Starting Wed |       | 9 18:23  | Units  |   |   |
|  19 at 1823, Intra-procedure |       | PST      |        |   |   |
|  (CATH/IR)                        |       |          |        |   |   |
+-----------------------------------+-------+----------+--------+---+---+
 
 
 
+---+---+
|   |   |
 
+---+---+
 
 
 
+----------------------------------+-------+----------+----------+---+---+
|   HYDROcodone-acetaminophen      | Given |  | 1 tablet |   |   |
| (NORCO)  MG per tablet 1   |       | 9 11:09  |          |   |   |
| tablet  1 tablet, Oral, Every 4  |       | PST      |          |   |   |
| Hours PRN, Severe Pain (7-10),   |       |          |          |   |   |
| Starting Wed 19 at 1910     |       |          |          |   |   |
+----------------------------------+-------+----------+----------+---+---+
 
 
 
+-------+----------+----------+---+---+
| Given |  | 1 tablet |   |   |
|       | 9 20:51  |          |   |   |
|       | PST      |          |   |   |
+-------+----------+----------+---+---+
 
 
 
+---+---+
|   |   |
+---+---+
 
 
 
+----------------------------------+-------+----------+----------+---+---+
|   HYDROcodone-acetaminophen      | Given |  | 1 tablet |   |   |
| (NORCO) 5-325 MG per tablet 1    |       | 9 06:26  |          |   |   |
| tablet  1 tablet, Oral, Every 4  |       | PST      |          |   |   |
| Hours PRN, Moderate Pain (4-6),  |       |          |          |   |   |
| Starting Wed 19 at 1910     |       |          |          |   |   |
+----------------------------------+-------+----------+----------+---+---+
 
 
 
+-------+----------+----------+---+---+
| Given |  | 1 tablet |   |   |
|       | 9 09:56  |          |   |   |
|       | PST      |          |   |   |
+-------+----------+----------+---+---+
 
 
 
+---+---+
|   |   |
+---+---+
 
 
 
+-----------------------------------+-------+----------+--------+---+----------+
|   HYDROmorphone (DILAUDID)        | Given |  | 0.5 mg |   | Left Arm |
| injection 0.5 mg  0.5 mg,         |       | 9 15:55  |        |   |          |
| Intravenous, Once, Wed 19 at |       | PST      |        |   |          |
|  1508, For 1 dose                 |       |          |        |   |          |
+-----------------------------------+-------+----------+--------+---+----------+
 
 
 
 
+-----------------------------------+---+
|                                   |   |
+-----------------------------------+---+
|   HYDROmorphone (DILAUDID)        |   |
| injection 0.5 mg  0.5 mg,         |   |
| Intravenous, Every 3 Hours PRN,   |   |
| Moderate Pain (4-6), Starting Wed |   |
|  19 at 1910                  |   |
+-----------------------------------+---+
|                                   |   |
+-----------------------------------+---+
|   HYDROmorphone (DILAUDID)        |   |
| injection 1 mg  1 mg,             |   |
| Intravenous, Every 3 Hours PRN,   |   |
| Severe Pain (7-10), Starting Wed  |   |
| 19 at 1910                   |   |
+-----------------------------------+---+
|                                   |   |
+-----------------------------------+---+
 
 
 
+-----------------------------------+-------+----------+----------+---+---+
|   insulin glargine (LANTUS)       | Given |  | 15 Units |   |   |
| injection 15 Units  15 Units,     |       | 9 22:07  |          |   |   |
| Subcutaneous, Nightly, First dose |       | PST      |          |   |   |
|  on Wed 19 at 2200           |       |          |          |   |   |
+-----------------------------------+-------+----------+----------+---+---+
 
 
 
+-------+----------+----------+---+---+
| Given |  | 15 Units |   |   |
|       | 9 22:39  |          |   |   |
|       | PST      |          |   |   |
+-------+----------+----------+---+---+
 
 
 
+---+---+
|   |   |
+---+---+
 
 
 
+-----------------------------------+-------+----------+---------+---+---+
|   insulin lispro (human)          | Given |  | 1 Units |   |   |
| (HUMALOG) injection 0-3 Units     |       | 9 22:39  |         |   |   |
| 0-3 Units, Subcutaneous, Nightly, |       | PST      |         |   |   |
|  First dose on 19 at     |       |          |         |   |   |
| 2200                              |       |          |         |   |   |
+-----------------------------------+-------+----------+---------+---+---+
 
 
 
+---+---+
|   |   |
+---+---+
 
 
 
 
+-----------------------------------+-------+----------+---------+---+---+
|   insulin lispro (human)          | Given |  | 1 Units |   |   |
| (HUMALOG) injection 0-6 Units     |       | 9 17:12  |         |   |   |
| 0-6 Units, Subcutaneous, 3 Times  |       | PST      |         |   |   |
| Daily Before Meals, First dose on |       |          |         |   |   |
|  19 at 1930              |       |          |         |   |   |
+-----------------------------------+-------+----------+---------+---+---+
 
 
 
+-------+----------+---------+---+---+
| Given |  | 2 Units |   |   |
|       | 9 12:34  |         |   |   |
|       | PST      |         |   |   |
+-------+----------+---------+---+---+
 
 
 
+---+---+
|   |   |
+---+---+
 
 
 
+---------------------------------+-------+----------+--------+---+---+
|   iohexol (OMNIPAQUE 240) 240   | Given |  | 53 mLs |   |   |
| mg/mL injection 53 mL  53 mL,   |       | 9 18:34  |        |   |   |
| Intra-arterial, Img Once PRN,   |       | PST      |        |   |   |
| Other, Starting Wed 19 at  |       |          |        |   |   |
| 1853, For 1 dose                |       |          |        |   |   |
+---------------------------------+-------+----------+--------+---+---+
 
 
 
+---+---+
|   |   |
+---+---+
 
 
 
+-----------------------------------+-------+----------+-------+---+---+
|   isosorbide mononitrate (IMDUR)  | Given |  | 60 mg |   |   |
| 24 hr tablet 60 mg  60 mg, Oral,  |       | 9 14:02  |       |   |   |
| Daily, First dose on u 19  |       | PST      |       |   |   |
| at 0900                           |       |          |       |   |   |
+-----------------------------------+-------+----------+-------+---+---+
 
 
 
+-------+----------+-------+---+---+
| Given |  | 60 mg |   |   |
|       | 9 08:18  |       |   |   |
|       | PST      |       |   |   |
+-------+----------+-------+---+---+
 
 
 
+---+---+
 
|   |   |
+---+---+
 
 
 
+----------------------------------+-------+----------+--------+---+---+
|   lidocaine 1 % injection  Once  | Given |  | 10 mLs |   |   |
| PRN, During PCI per physician,   |       | 9 18:01  |        |   |   |
| Starting Wed 19 at 1801,    |       | PST      |        |   |   |
| Intra-procedure (CATH/IR)        |       |          |        |   |   |
+----------------------------------+-------+----------+--------+---+---+
 
 
 
+---+---+
|   |   |
+---+---+
 
 
 
+-----------------------------------+-------+----------+------+---+---+
|   LORazepam (ATIVAN) tablet 1 mg  | Given |  | 1 mg |   |   |
|  1 mg, Oral, Every 8 Hours PRN,   |       | 9 23:26  |      |   |   |
| Anxiety, Starting 19 at  |       | PST      |      |   |   |
| 1910                              |       |          |      |   |   |
+-----------------------------------+-------+----------+------+---+---+
 
 
 
+-------+----------+------+---+---+
| Given |  | 1 mg |   |   |
|       | 9 08:39  |      |   |   |
|       | PST      |      |   |   |
+-------+----------+------+---+---+
 
 
 
+---+---+
|   |   |
+---+---+
 
 
 
+----------------------------------+-------+----------+--------+---+---+
|   metroNIDAZOLE (FLAGYL) tablet  | Given |  | 500 mg |   |   |
| 500 mg  500 mg, Oral, Every 8    |       | 9 22:39  |        |   |   |
| Hours Scheduled (3 times per     |       | PST      |        |   |   |
| day), First dose on 19  |       |          |        |   |   |
| at 1400, Indications: Purulent   |       |          |        |   |   |
| Skin and Soft Tissue Infection   |       |          |        |   |   |
+----------------------------------+-------+----------+--------+---+---+
 
 
 
+-------+----------+--------+---+---+
| Given |  | 500 mg |   |   |
|       | 9 05:57  |        |   |   |
|       | PST      |        |   |   |
+-------+----------+--------+---+---+
| Given |  | 500 mg |   |   |
 
|       | 9 14:01  |        |   |   |
|       | PST      |        |   |   |
+-------+----------+--------+---+---+
 
 
 
+---+---+
|   |   |
+---+---+
 
 
 
+----------------------------------+-------+----------+------+---+---+
|   midazolam (VERSED) injection   | Given |  | 1 mg |   |   |
| Intravenous, Once PRN, Starting  |       | 9 18:00  |      |   |   |
| Wed 19 at 1800,             |       | PST      |      |   |   |
| Intra-procedure (CATH/IR)        |       |          |      |   |   |
+----------------------------------+-------+----------+------+---+---+
 
 
 
+-------+----------+------+---+---+
| Given |  | 1 mg |   |   |
|       | 9 18:23  |      |   |   |
|       | PST      |      |   |   |
+-------+----------+------+---+---+
 
 
 
+---+---+
|   |   |
+---+---+
 
 
 
+-----------------------------------+-------+----------+-------+---+---+
|   nortriptyline (PAMELOR) capsule | Given |  | 25 mg |   |   |
|  25 mg  25 mg, Oral, Every        |       | 9 08:18  |       |   |   |
| Morning, First dose on u        |       | PST      |       |   |   |
| 19 at 0900                   |       |          |       |   |   |
+-----------------------------------+-------+----------+-------+---+---+
 
 
 
+---+---+
|   |   |
+---+---+
 
 
 
+-----------------------------------+-------+----------+-------+---+---+
|   nortriptyline (PAMELOR) capsule | Given |  | 75 mg |   |   |
|  75 mg  75 mg, Oral, Nightly,     |       | 9 22:07  |       |   |   |
| First dose on 19 at 2200 |       | PST      |       |   |   |
+-----------------------------------+-------+----------+-------+---+---+
 
 
 
+-------+----------+-------+---+---+
| Given |  | 75 mg |   |   |
 
|       | 9 20:52  |       |   |   |
|       | PST      |       |   |   |
+-------+----------+-------+---+---+
 
 
 
+---+---+
|   |   |
+---+---+
 
 
 
+-----------------------------------+-------+----------+------+---+---+
|   ondansetron (ZOFRAN) injection  | Given |  | 4 mg |   |   |
| 4 mg  4 mg, Intravenous, Every 6  |       | 9 12:38  |      |   |   |
| Hours PRN, Nausea, Vomiting,      |       | PST      |      |   |   |
| Starting Wed 19 at 1910      |       |          |      |   |   |
+-----------------------------------+-------+----------+------+---+---+
 
 
 
+-------+----------+------+---+---+
| Given |  | 4 mg |   |   |
|       | 9 22:39  |      |   |   |
|       | PST      |      |   |   |
+-------+----------+------+---+---+
 
 
 
+-----------------------------------+---+
|                                   |   |
+-----------------------------------+---+
|   ondansetron (ZOFRAN-ODT)        |   |
| disintegrating tablet 4 mg  4 mg, |   |
|  Oral, Every 6 Hours PRN, Nausea, |   |
|  Vomiting, Starting 19   |   |
| at 1910                           |   |
+-----------------------------------+---+
|                                   |   |
+-----------------------------------+---+
 
 
 
+-----------------------------------+-------+----------+--------+---+---+
|   oxybutynin (DITROPAN) tablet    | Given |  | 2.5 mg |   |   |
| 2.5 mg  2.5 mg, Oral, 2 Times     |       | 9 21:59  |        |   |   |
| Daily, First dose on 19  |       | PST      |        |   |   |
| at 2100                           |       |          |        |   |   |
+-----------------------------------+-------+----------+--------+---+---+
 
 
 
+-------+----------+--------+---+---+
| Given |  | 2.5 mg |   |   |
|       | 9 20:51  |        |   |   |
|       | PST      |        |   |   |
+-------+----------+--------+---+---+
| Given |  | 2.5 mg |   |   |
|       | 9 08:18  |        |   |   |
|       | PST      |        |   |   |
 
+-------+----------+--------+---+---+
 
 
 
+---+---+
|   |   |
+---+---+
 
 
 
+-----------------------------------+-------+----------+-------+---+---+
|   pantoprazole (PROTONIX) EC      | Given |  | 40 mg |   |   |
| tablet 40 mg  40 mg, Oral, Every  |       | 9 06:26  |       |   |   |
| Morning Before Breakfast, First   |       | PST      |       |   |   |
| dose on u 19 at 0630       |       |          |       |   |   |
+-----------------------------------+-------+----------+-------+---+---+
 
 
 
+-------+----------+-------+---+---+
| Given |  | 40 mg |   |   |
|       | 9 05:57  |       |   |   |
|       | PST      |       |   |   |
+-------+----------+-------+---+---+
 
 
 
+---+---+
|   |   |
+---+---+
 
 
 
+-----------------------------------+-------+----------+---------+---+---+
|   rOPINIRole (REQUIP) tablet 0.75 | Given |  | 0.75 mg |   |   |
|  mg  0.75 mg, Oral, Nightly,      |       | 9 21:58  |         |   |   |
| First dose on 19 at 2200 |       | PST      |         |   |   |
+-----------------------------------+-------+----------+---------+---+---+
 
 
 
+-------+----------+---------+---+---+
| Given |  | 0.75 mg |   |   |
|       | 9 20:51  |         |   |   |
|       | PST      |         |   |   |
+-------+----------+---------+---+---+
 
 
 
+---+---+
|   |   |
+---+---+
 
 
 
+-----------------------------------+-------+----------+---+---+---+
|   silver sulfADIAZINE (SILVADENE) | Given |  |   |   |   |
|  1 % cream  Topical, 2 Times      |       | 9 13:00  |   |   |   |
| Daily, First dose on 19  |       | PST      |   |   |   |
| at 0930, On the right foot and    |       |          |   |   |   |
 
| cover with  telfa roll gauze and  |       |          |   |   |   |
| secure with  Tape.                |       |          |   |   |   |
+-----------------------------------+-------+----------+---+---+---+
 
 
 
+---+---+
|   |   |
+---+---+
 
 
 
+-----------------------------------+-------+----------+--------+---+---+
|   sodium chloride (PF) 0.9 %      | Given |  | 10 mLs |   |   |
| flush 10 mL  10 mL, Intravenous,  |       | 9 11:30  |        |   |   |
| Every 8 Hours, First dose on Wed  |       | PST      |        |   |   |
| 19 at 1930                   |       |          |        |   |   |
+-----------------------------------+-------+----------+--------+---+---+
 
 
 
+---+---+
|   |   |
+---+---+
 
 
 
+----------------------------------+---------+----------+-------+---+---+
|   vancomycin (VANCOCIN) 1500     | New Bag |  | 1.5 g |   |   |
| mg/250 mL IVPB  1.5 g,           |         | 9 18:12  |       |   |   |
| Intravenous, Administer over 90  |         | PST      |       |   |   |
| Minutes, Once, Wed 19 at    |         |          |       |   |   |
| 1432, For 1 dose                 |         |          |       |   |   |
+----------------------------------+---------+----------+-------+---+---+
 
 
 
+-----------------------------------+---+
|                                   |   |
+-----------------------------------+---+
|   vancomycin (VANCOCIN) 500 mg in |   |
|  sodium chloride (IV) 0.9 % 100   |   |
| mL IVPB  500 mg, Intravenous,     |   |
| Administer over 60 Minutes, See   |   |
| Admin Instructions, Starting Wed  |   |
| 19 at 1910, Administer dose  |   |
| during last hour or immediately   |   |
| following each Hemodialysis       |   |
| session. Use Rx button to message |   |
|  pharmacy for dose.               |   |
+-----------------------------------+---+
|                                   |   |
+-----------------------------------+---+
 
 
 
+-----------------------------------+-------+----------+--------+-------+---+
|   vancomycin (VANCOCIN) 500 mg in | Given |  | 500 mg | 100   |   |
|  sodium chloride (IV) 0.9 % 100   |       | 9 12:34  |        | mL/hr |   |
| mL IVPB  500 mg, Intravenous,     |       | PST      |        |       |   |
 
| Administer over 60 Minutes, Once, |       |          |        |       |   |
|  Fri 19 at 1230, For 1 dose  |       |          |        |       |   |
+-----------------------------------+-------+----------+--------+-------+---+
 
 
 
+---+---+
|   |   |
+---+---+
 in this encounter

## 2019-04-19 NOTE — XMS
Encounter Summary
  Created on: 2019
 
 Chreie Villalobos
 External Reference #: [id number]
 : 48
 Sex: Female
 
 Demographics
 
 
+-----------------------+--------------------------+
| Address               | [address]               |
|                       | [place]  [id number] |
+-----------------------+--------------------------+
| Home Phone            | [phone]          |
+-----------------------+--------------------------+
| Preferred Language    | Unknown                  |
+-----------------------+--------------------------+
| Marital Status        |                   |
+-----------------------+--------------------------+
| Catholic Affiliation | 1013                     |
+-----------------------+--------------------------+
| Race                  | Unknown                  |
+-----------------------+--------------------------+
| Ethnic Group          | Unknown                  |
+-----------------------+--------------------------+
 
 
 Author
 
 
+--------------+-----------------------+
| Author       | [email or web address] |
+--------------+-----------------------+
| Organization | [facility] |
+--------------+-----------------------+
| Address      | Unknown               |
+--------------+-----------------------+
| Phone        | Unavailable           |
+--------------+-----------------------+
 
 
 
 Support
 
 
+---------------+--------------+---------------------+-----------------+
| Name          | Relationship | Address             | Phone           |
+---------------+--------------+---------------------+-----------------+
| Anoop Villalobos | ECON         | [place], | [phone] |
|               |              |  [place]  [address]     |                 |
+---------------+--------------+---------------------+-----------------+
| Jennifer Dickson | ECON         | [place]       | [phone] |
|               |              | [id number]               |                 |
+---------------+--------------+---------------------+-----------------+
 
 
 
 
 Care Team Providers
 
 
+-----------------------+------+-----------------+
| Care Team Member Name | Role | Phone           |
+-----------------------+------+-----------------+
| Ivy Couch DO      | PCP  | [phone] |
+-----------------------+------+-----------------+
 
 
 
 Reason for Visit
 
 
+-------------------+----------+
| Reason            | Comments |
+-------------------+----------+
| Medication Refill |          |
+-------------------+----------+
 
 
 
 Encounter Details
 
 
+--------+--------+----------------------+----------------------+-------------+
| Date   | Type   | Department           | Care Team            | Description |
+--------+--------+----------------------+----------------------+-------------+
| / | Refill |   [facility]      |   João Asher MD  |             |
|    |        | [address]        |  [address]   |             |
|        |        | [address]   | [address]  [place], [place]    |             |
|        |        | [place] [address]   | [address]  [phone]  |             |
|        |        | [phone]         |  [phone] (Fax)  |             |
+--------+--------+----------------------+----------------------+-------------+
 
 
 
 Social History
 
 
+---------------+------------+-----------+--------+------------------+
| Tobacco Use   | Types      | Packs/Day | Years  | Date             |
|               |            |           | Used   |                  |
+---------------+------------+-----------+--------+------------------+
| Former Smoker | Cigarettes | 1         | 30     | Quit: 2004 |
+---------------+------------+-----------+--------+------------------+
 
 
 
+---------------------+---+---+---+
| Smokeless Tobacco:  |   |   |   |
| Never Used          |   |   |   |
+---------------------+---+---+---+
 
 
 
+------------------------------+
| Comments: quite smoking 2004 |
+------------------------------+
 
 
 
 
+-------------+-----------+---------+----------+
| Alcohol Use | Drinks/We | oz/Week | Comments |
|             | ek        |         |          |
+-------------+-----------+---------+----------+
| No          |           |         |          |
+-------------+-----------+---------+----------+
 
 
 
+------------------+---------------+
| Sex Assigned at  | Date Recorded |
| Birth            |               |
+------------------+---------------+
| Not on file      |               |
+------------------+---------------+
 as of this encounter
 
 Plan of Treatment
 
 
+--------+----------+-----------------+----------------------+-------------+
| Date   | Type     | Specialty       | Care Team            | Description |
+--------+----------+-----------------+----------------------+-------------+
| / | Initial  | General Surgery |   Сергей Medeiros, |             |
|    | consult  |                 |  MD [address]  |             |
|        |          |                 |  [place] [address]  |             |
|        |          |                 |  [phone]        |             |
|        |          |                 | [phone] (Fax)   |             |
+--------+----------+-----------------+----------------------+-------------+
 as of this encounter
 
 Visit Diagnoses
 Not on filein this encounter

## 2019-04-19 NOTE — XMS
Encounter Summary
  Created on: 2019
 
 Cherie Villalobos
 External Reference #: [id number]
 : 48
 Sex: Female
 
 Demographics
 
 
+-----------------------+--------------------------+
| Address               | [address]               |
|                       | [place]  [address] |
+-----------------------+--------------------------+
| Home Phone            | [phone]          |
+-----------------------+--------------------------+
| Preferred Language    | Unknown                  |
+-----------------------+--------------------------+
| Marital Status        |                   |
+-----------------------+--------------------------+
| Confucianism Affiliation | [id number]                     |
+-----------------------+--------------------------+
| Race                  | Unknown                  |
+-----------------------+--------------------------+
| Ethnic Group          | Unknown                  |
+-----------------------+--------------------------+
 
 
 Author
 
 
+--------------+--------------------------------------------+
| Author       | [facility]  |
|              | [facility] [place]                                |
+--------------+--------------------------------------------+
| Organization | [facility]  |
|              | [facility] [place]                                |
+--------------+--------------------------------------------+
| Address      | Unknown                                    |
+--------------+--------------------------------------------+
| Phone        | Unavailable                                |
+--------------+--------------------------------------------+
 
 
 
 Support
 
 
+---------------+--------------+---------------------+-----------------+
| Name          | Relationship | Address             | Phone           |
+---------------+--------------+---------------------+-----------------+
| Anoop Villalobos | ECON         | [address], | [phone] |
|               |              |  [place]  [address]     |                 |
+---------------+--------------+---------------------+-----------------+
| Jennifer Dickson | ECON         | [place]       | [phone] |
|               |              | [id number]               |                 |
+---------------+--------------+---------------------+-----------------+
 
 
 
 
 Care Team Providers
 
 
+--------------------------+------+-----------------+
| Care Team Member Name    | Role | Phone           |
+--------------------------+------+-----------------+
| Araseli Montelongo Activity  | PCP  | [phone] |
| Professional             |      |                 |
+--------------------------+------+-----------------+
 
 
 
 Reason for Visit
 
 
+--------+----------+
| Reason | Comments |
+--------+----------+
| Other  | angina   |
+--------+----------+
 
 
 
 Encounter Details
 
 
+--------+-----------+----------------------+----------------------+----------------+
| Date   | Type      | Department           | Care Team            | Description    |
+--------+-----------+----------------------+----------------------+----------------+
| / | Telephone |   [facility]          |   Johnie John, | Other (angina) |
| 2019   |           | CARDIOLOGY  [address]    |  MD  [address] |                |
|        |           | [place]  [place], |  [address]  [place],   |                |
|        |           |  [place] [address]       | [place] [address]             |                |
|        |           | [phone]         | [phone]         |                |
|        |           |                      | [phone] (Fax)   |                |
+--------+-----------+----------------------+----------------------+----------------+
 
 
 
 Social History
 
 
+---------------+------------+-----------+--------+------------------+
| Tobacco Use   | Types      | Packs/Day | Years  | Date             |
|               |            |           | Used   |                  |
+---------------+------------+-----------+--------+------------------+
| Former Smoker | Cigarettes | 1         | 30     | Quit: 2004 |
+---------------+------------+-----------+--------+------------------+
 
 
 
+---------------------+---+---+---+
| Smokeless Tobacco:  |   |   |   |
| Never Used          |   |   |   |
+---------------------+---+---+---+
 
 
 
 
+-------------+-----------+---------+----------+
| Alcohol Use | Drinks/We | oz/Week | Comments |
|             | ek        |         |          |
+-------------+-----------+---------+----------+
| No          |           |         |          |
+-------------+-----------+---------+----------+
 
 
 
+------------------+---------------+
| Sex Assigned at  | Date Recorded |
| Birth            |               |
+------------------+---------------+
| Not on file      |               |
+------------------+---------------+
 
 
 
+----------------+-------------+-------------+
| Job Start Date | Occupation  | Industry    |
+----------------+-------------+-------------+
| Not on file    | Not on file | Not on file |
+----------------+-------------+-------------+
 
 
 
+----------------+--------------+------------+
| Travel History | Travel Start | Travel End |
+----------------+--------------+------------+
 
 
 
+-------------------------------------+
| No recent travel history available. |
+-------------------------------------+
 documented as of this encounter
 
 Functional Status
 
 
+---------------------------------------------+----------+--------------------+
| Functional Status                           | Response | Date of Assessment |
+---------------------------------------------+----------+--------------------+
| Are you deaf or do you have serious         | No       | 2018         |
| difficulty hearing?                         |          |                    |
+---------------------------------------------+----------+--------------------+
| Are you blind or do you have serious        | No       | 2018         |
| difficulty seeing, even when wearing        |          |                    |
| glasses?                                    |          |                    |
+---------------------------------------------+----------+--------------------+
| Do you have serious difficulty walking or   | No       | 2018         |
| climbing stairs? (5 years old or older)     |          |                    |
+---------------------------------------------+----------+--------------------+
| Do you have difficulty dressing or bathing? | No       | 2018         |
|  (5 years old or older)                     |          |                    |
+---------------------------------------------+----------+--------------------+
| Because of a physical, mental, or emotional | No       | 2018         |
|  condition, do you have difficulty doing    |          |                    |
| errands alone such as visiting a doctor's   |          |                    |
 
| office or shopping? [15 years old or        |          |                    |
| older)]                                     |          |                    |
+---------------------------------------------+----------+--------------------+
 
 
 
+---------------------------------------------+----------+--------------------+
| Cognitive Status                            | Response | Date of Assessment |
+---------------------------------------------+----------+--------------------+
| Because of a physical, mental, or emotional | No       | 2018         |
|  condition, do you have serious difficulty  |          |                    |
| concentrating, remembering, or making       |          |                    |
| decisions? (5 years old or older)           |          |                    |
+---------------------------------------------+----------+--------------------+
 documented as of this encounter
 
 Plan of Treatment
 
 
+--------+---------+------------+----------------------+-------------+
| Date   | Type    | Specialty  | Care Team            | Description |
+--------+---------+------------+----------------------+-------------+
| / | Office  | Cardiology |   Johnie John, |             |
|    | Visit   |            |  MD  [address] |             |
|        |         |            |  [place],   |             |
|        |         |            | [place] [address]             |             |
|        |         |            | [phone]         |             |
|        |         |            | [phone] (Fax)   |             |
+--------+---------+------------+----------------------+-------------+
 documented as of this encounter
 
 Visit Diagnoses
 Not on filedocumented in this encounter

## 2019-04-19 NOTE — XMS
Encounter Summary
  Created on: 2019
 
 Cherie Villalobos
 External Reference #: [id number]
 : 48
 Sex: Female
 
 Demographics
 
 
+-----------------------+--------------------------+
| Address               | [address]               |
|                       | [place]  [address] |
+-----------------------+--------------------------+
| Home Phone            | [phone]          |
+-----------------------+--------------------------+
| Preferred Language    | Unknown                  |
+-----------------------+--------------------------+
| Marital Status        |                   |
+-----------------------+--------------------------+
| Baptism Affiliation | 1013                     |
+-----------------------+--------------------------+
| Race                  | Unknown                  |
+-----------------------+--------------------------+
| Ethnic Group          | Unknown                  |
+-----------------------+--------------------------+
 
 
 Author
 
 
+--------------+-----------------------+
| Author       | [employer] |
+--------------+-----------------------+
| Organization | [facility] |
+--------------+-----------------------+
| Address      | Unknown               |
+--------------+-----------------------+
| Phone        | Unavailable           |
+--------------+-----------------------+
 
 
 
 Support
 
 
+---------------+--------------+---------------------+-----------------+
| Name          | Relationship | Address             | Phone           |
+---------------+--------------+---------------------+-----------------+
| Anoop Villalobos | ECON         | [place], | [phone] |
|               |              |  [place]  [address]     |                 |
+---------------+--------------+---------------------+-----------------+
| Jennifer Dickson | ECON         | [place]       | [phone] |
|               |              | [address]               |                 |
+---------------+--------------+---------------------+-----------------+
 
 
 
 
 Care Team Providers
 
 
+-----------------------+------+-----------------+
| Care Team Member Name | Role | Phone           |
+-----------------------+------+-----------------+
| Ivy Couch DO      | PCP  | [phone] |
+-----------------------+------+-----------------+
 
 
 
 Reason for Visit
 
 
+-----------+---------------------------+
| Reason    | Comments                  |
+-----------+---------------------------+
| Follow-up | 6 wk f/u possible pd cath |
+-----------+---------------------------+
 
 
 
 Encounter Details
 
 
+--------+---------+----------------------+----------------------+----------------------+
| Date   | Type    | Department           | Care Team            | Description          |
+--------+---------+----------------------+----------------------+----------------------+
| / | Office  |   [facility]      |   Kirt Mclaughlin MD     | PAD (peripheral      |
| 2019   | Visit   | Vascular Surgery     | [address]  | artery disease)      |
|        |         | [address] |  [place] [address]  | ([facility]) (Primary Dx);  |
|        |         |  E  [place], [place]     |  [phone]        | ESRD (end stage      |
|        |         | [address]           | [phone] (Fax)   | renal disease) ([facility]) |
|        |         | [phone]         |                      |                      |
+--------+---------+----------------------+----------------------+----------------------+
 
 
 
 Social History
 
 
+---------------+------------+-----------+--------+------------------+
| Tobacco Use   | Types      | Packs/Day | Years  | Date             |
|               |            |           | Used   |                  |
+---------------+------------+-----------+--------+------------------+
| Former Smoker | Cigarettes | 1         | 30     | Quit: 2004 |
+---------------+------------+-----------+--------+------------------+
 
 
 
+---------------------+---+---+---+
| Smokeless Tobacco:  |   |   |   |
| Never Used          |   |   |   |
+---------------------+---+---+---+
 
 
 
+------------------------------+
| Comments: quite smoking  |
 
+------------------------------+
 
 
 
+-------------+-----------+---------+----------+
| Alcohol Use | Drinks/We | oz/Week | Comments |
|             | ek        |         |          |
+-------------+-----------+---------+----------+
| No          |           |         |          |
+-------------+-----------+---------+----------+
 
 
 
+------------------+---------------+
| Sex Assigned at  | Date Recorded |
| Birth            |               |
+------------------+---------------+
| Not on file      |               |
+------------------+---------------+
 as of this encounter
 
 Last Filed Vital Signs
 
 
+-------------------+---------+-------------------------+
| Vital Sign        | Reading | Time Taken              |
+-------------------+---------+-------------------------+
| Blood Pressure    | 155/77  | 2019  1:14 PM PST |
+-------------------+---------+-------------------------+
| Pulse             | 89      | 2019  1:14 PM PST |
+-------------------+---------+-------------------------+
| Temperature       | -       | -                       |
+-------------------+---------+-------------------------+
| Respiratory Rate  | -       | -                       |
+-------------------+---------+-------------------------+
| Oxygen Saturation | 100%    | 2019  1:14 PM PST |
+-------------------+---------+-------------------------+
| Inhaled Oxygen    | -       | -                       |
| Concentration     |         |                         |
+-------------------+---------+-------------------------+
| Weight            | -       | -                       |
+-------------------+---------+-------------------------+
| Height            | -       | -                       |
+-------------------+---------+-------------------------+
| Body Mass Index   | -       | -                       |
+-------------------+---------+-------------------------+
 in this encounter
 
 Progress Notes
 Kirt Mclaughlin MD - 2019  2:45 PM PSTFormatting of this note may be different from the o
aleksandar.
 Ms. Villalobos is a pleasant 68 y.o. female with PMH significant for acute MI, anemia, Afib, KD
, COPD, CAD, diabetes, ESRD on HD, hyperlipidemia, hypertension who is referred to me for RL
E ulcerations and PD catheter consideration. Patient has ulcerations on her right second toe
 and is seen by both Infectious Disease and Podiatry for this. Patient had recent left foot 
amputation via Dr. Jordan in the end of December. Patient describes that she has ulceration
s on her hands that she had been told has been vascular in etiology. Patient is followed by 
Dr. Elliott, Infectious Disease, and is receiving IV antibiotics on days she has dialysis. Pratima
ent is also considering peritoneal dialysis catheter but still hesitant about this. Patient 
is seen by Dr. Matias, podiatry. 
 
 
 US Bilateral Lower Extremity Arterial
 
 Impression 
 1. Right leg shows biphasic inflow with a greater than 50% stenosis at 
 the origin of the superficial femoral artery (137-309). Biphasic 
 outflow. 
 2. Two vessel runoff to the right foot. 
 Signed by: Eugenio Hoffman 
 Sign Date/Time: 2019 3:11 PM 
 
 Past Medical History 
 Diagnosis Date 
   Acute MI ([facility])  
  with stenting x 1 
   Anemia associated with chronic renal failure 2016 
   Atrial fibrillation (HCC)  
   Chronic kidney disease  
   Congestive heart failure (HCC)  
   COPD (chronic obstructive pulmonary disease) (HCC)  
   COPD (chronic obstructive pulmonary disease) ([facility]) 2018 
   Coronary artery disease  
  stent placements: 3 total 
   Depression  
   Diabetes other 
  abstracted 
   Diabetes mellitus, type 2 (HCC)  
   ESRD on peritoneal dialysis (HCC)  
   GERD (gastroesophageal reflux disease)  
   Hemodialysis patient ([facility]) 10/2016 
  Stopped peritoneal and restarted hemodialysis 
   Hemorrhage of gastrointestinal tract, unspecified 2017 
  low GI, rectal bleeding 
   High blood pressure other 
  abstracted 
   High cholesterol other 
  abstracted 
   Hyperlipidemia  
   Hypertension  
   Hyponatremia 2017 
   Myocardial infarction (HCC) 2017 
   Other chronic pain  
  feet,  
   Pain of left hip joint 2016 
  due to hip fracture and replacement 
   Partial small bowel obstruction ([facility]) 2017 
  resolved 
   Renal failure  
  Stage 5.   Fistula in the JAN - not on dialysis yet. 
   Unspecified visual disturbance  
  glasses 
 
 Past Surgical History 
 Procedure Laterality Date 
   AV FISTULA PLACEMENT   
  left upper arm AV fistula - failed 
   AV FISTULA REPAIR N/A 10/25/2016 
  Procedure: AV FISTULA - GRAFT REPAIR/REVISION;  Surgeon: Kirt Mclaughlin MD;  Location: [facility]
IN OR;  Service: Vascular;  Laterality: N/A;  Superficialization and revision of left arm fi
stula 
 
   CARDIAC CATHETERIZATION  2017 
   CARPAL TUNNEL RELEASE Bilateral other 
  abstracted 
   CATARACT EXTRACTION Bilateral 2007 
   CATHETER REMOVAL N/A 2016 
  Procedure: DIALYSIS CATHETER - REMOVAL;  Surgeon: Kirt Mclaughlin MD;  Location: [facility] OR; 
 Service: Vascular;  Laterality: N/A;  PD cath removal 
   CHOLECYSTECTOMY  other 
  abstracted 
   COLONOSCOPY   
   CORONARY ARTERY BYPASS GRAFT   
   CORONARY STENT PLACEMENT  2017 
  Drug Elutin stents to OM, 1 stent to prox. LAD 
   EYE SURGERY   
   FOOT AMPUTATION Left 2018 
  Procedure: FOREFOOT - AMPUTATION;  Surgeon: Srinivasan Jordan DPM;  Location: [facility] MAIN OR;
  Service: Podiatry;  Laterality: Left; 
   HARDWARE PRESENT   
  stent placements x 3, Left hip replacement, vascular stents 2 in left leg 
   HIP ARTHROPLASTY Left 2016 
  Procedure: HIP - ARTHROPLASTY(ALLISON);  Surgeon: Uriel Roa MD;  Location: [facility] MAIN 
OR;  Service: Orthopedics;  Laterality: Left; 
   HYSTERECTOMY  1998 
  complete 
   PACEMAKER INSERTION   
  [employer] pacemaker/defib 
   PERITONEAL CATHETER INSERTION  8/15/2013 
   PERITONEAL CATHETER INSERTION N/A 2016 
  Procedure: LAPAROSCOPIC - PERITONEAL DIALYSIS CATH INSERTION;  Surgeon: Kirt Mclaughlin MD;  Lo
cation: [facility] MAIN OR;  Service: Vascular;  Laterality: N/A;  Removal of old catheter 
   SHOULDER SURGERY Right  
  frozen shoulder 
   UNLISTED PROCEDURE ARTHROSCOPY   
  total Left hip 
   vascular stents Left 2017 
  leg (2 stents) 
   VASCULAR SURGERY   
 
 Social History 
 Substance Use Topics 
   Smoking status: Former Smoker 
   Packs/day: 1.00 
   Years: 30.00 
   Types: Cigarettes 
   Quit date: 2004 
   Smokeless tobacco: Never Used 
    Comment: quite smoking  
   Alcohol use No 
 
 Family History 
 Problem Relation Age of Onset 
   High cholesterol Father  
   Hypertension Father  
   Diabetes Paternal Grandmother  
   Malig hypertherm Neg Hx  
   Kidney disease Neg Hx  
 
 Current Outpatient Prescriptions on File Prior to Visit 
 Medication Sig Dispense Refill 
   albuterol (PROVENTIL HFA;VENTOLIN HFA) 108 (90 Base) MCG/ACT inhaler Inhale 2 puffs int
 
o the lungs every 4 (four) hours as needed for Wheezing.   
   albuterol (VENTOLIN HFA) 108 (90 Base) MCG/ACT inhaler Ventolin HFA 90 mcg/actuation ae
rosol inhaler
  Inhale 2 puffs every 4 hours by inhalation route as needed.   
   apixaban (ELIQUIS) 2.5 MG tablet Take 1 tablet by mouth 2 (two) times daily. 60 tablet 
0 
   atorvastatin (LIPITOR) 40 MG tablet Take 1 tablet by mouth nightly. 30 tablet 0 
   bisacodyl (DULCOLAX) 10 MG suppository Place 10 mg rectally.   
   calcium acetate (PHOSLO) 667 MG capsule 667 mg 3 (three) times daily with meals.   
   carvedilol (COREG) 12.5 MG tablet Take 1 tablet by mouth 2 (two) times daily with meals
. 60 tablet 0 
   cefTAZidime (FORTAZ) 2 g injection Inject 2 g into the muscle After Hemodialysis (see a
dmin instructions) for 7 days. 1 each  
   Cholecalciferol (VITAMIN D3) 5000 UNITS CAPS Take 5,000 capsules by mouth every other d
ay.   
   clopidogrel (PLAVIX) 75 MG tablet Take 1 tablet by mouth daily. 30 tablet 11 
   esomeprazole (NEXIUM) 20 MG capsule NEXIUM(ESOMEPRAZOLE MAGNESIUM) 20 MG CAPSULE.DR
 Dose: 1 CAP   
   HYDROcodone-acetaminophen (NORCO) 5-325 MG per tablet Take 1 tablet by mouth every 4 (f
our) hours as needed. 30 tablet 0 
   insulin aspart (NOVOLOG) 100 UNIT/ML injection Inject  into the skin 3 (three) times da
melina before meals.   
   insulin degludec (TRESIBA) 100 UNIT/ML injection Tresiba Flextouch U-100(INSULIN DEGLUD
EC) 100 UNIT/1 ML INSULN.PEN
 Dose: 20 UNIT at night   
   isosorbide mononitrate (IMDUR) 60 MG 24 hr tablet Take 1 tablet by mouth daily. 30 tabl
et 1 
   loperamide (IMODIUM) 2 MG capsule 2 mg as needed.   
   LORazepam (ATIVAN) 1 MG tablet Take 1 tablet by mouth every 8 (eight) hours as needed f
or Anxiety. 30 tablet 0 
   Magnesium Cl-Calcium Carbonate (SLOW-MAG) 71.5-119 MG TBEC Take 1 tablet by mouth every
 other day.   
   nitroGLYCERIN (NITROSTAT) 0.4 MG SL tablet Place 1 tablet under the tongue every 5 (fiv
e) minutes as needed for Chest pain. 30 tablet 0 
   nortriptyline (PAMELOR) 25 MG capsule Take 25 mg by mouth 3 (three) times daily. Takes 
25 mg in am , and 75 mg at night   
   ondansetron (ZOFRAN-ODT) 4 MG disintegrating tablet Take 4 mg by mouth as needed.   
   oxybutynin (DITROPAN) 5 MG tablet Take 2.5 mg by mouth 2 (two) times daily.   
   prochlorperazine 5 MG tablet TAKE ONE TABLET BY MOUTH TWICE DAILY AS NEEDED FOR NAUSEA 
60 tablet 11 
   ranitidine (ZANTAC) 150 MG tablet ranitidine 150 mg tablet
  Take 1 tablet twice a day by oral route.   
   rOPINIRole (REQUIP) 0.25 MG tablet Take 0.75 mg by mouth nightly.   
   vancomycin (VANCOCIN) IVPB Inject 750 mg into the vein After Hemodialysis (see admin in
structions) for 7 days. Dilute in appropriate volume of IV fluid and give by slow IV infusio
n.  0 
   Vortioxetine HBr 10 MG TABS 10 mg nightly.   
 
 No current facility-administered medications on file prior to visit.  
 
 Allergies 
 Allergen Reactions 
   Quinapril Hcl Anaphylaxis 
   Digoxin And Related Other (See Comments) 
   toxic 
   Metaxalone Other (See Comments) 
   Morphine Mental Changes 
   Make her crazy/ "funny feeling in my head"
 Has used hydromorphone in-house 
   Pantoprazole Sodium Nausea and Vomiting 
 
   Penicillins Rash 
   Quinapril Hcl Edema 
   Facial Edema 
   Sudafed [Pseudoephedrine Hcl] Rash 
 
 Comprehensive ROS performed and pertinent items described in the HPI. 
 
 Vitals:reviewed
 CONSTITUTIONAL:  Conversant, well developed, NAD
 EYES: Anicteric sclerae, no lid drag, no proptosis
 RESP: Normal effort, regular, even, unlabored rate
 CV: No peripheral edema, rate regular
 SKIN: Pink, warm, dry without rash/lesion
 MS: ROM not limited, no digital cyanosis, normal gait
 NEURO: Cranial nerves II-XII grossly intact, A&O times 3
 PSYCH: appropriate affect, speech and tone, judgement and insight intact
 Vascular: strong biphasic signals in right foot.  
 
 Discussed ultrasound results from 19 with the patient, which show mildly decreased blo
od flow in her right leg. The patient's nonhealing right foot ulcerations are likely due to 
infection and not due to vascular issues. Discussed with the patient that the symptoms in he
r hands are due to vascular access steal syndrome. Recommended against PD catheter due to kurtis rodriguez's past issues concerning adhesions and recommended that the patient stay with HD as lo
ng as she is still tolerating it well. Discussed with the patient that the ulceration in her
 right foot still shows signs of infection and patient was advised to monitor it closely to 
prevent amputation.  If there is still poor healing after infection control, we may consider
 repeating arteriogram at that time. All questions and concerns addressed. Patient will foll
ow up in 1 month. Patient understands and is agreeable. 
 
 Kirt Mclaughlin MD
 
 Attending Note: Documentation assistance provided by Tito Clark (David). Information tammy
rded by the [email or web address] has been reviewed and validated by me. I agree with its contents.
 
 Signed by: David Combs 19, 1:38 PM
 in this encounter
 
 Plan of Treatment
 
 
+--------+----------+-----------------+----------------------+-------------+
| Date   | Type     | Specialty       | Care Team            | Description |
+--------+----------+-----------------+----------------------+-------------+
| / | Initial  | General Surgery |   Сергей Medeiros, |             |
|    | consult  |                 |  [address]  |             |
|        |          |                 |  [place] [address]  |             |
|        |          |                 |  [phone]        |             |
|        |          |                 | [phone] (Fax)   |             |
+--------+----------+-----------------+----------------------+-------------+
 as of this encounter
 
 Visit Diagnoses
 
 
+----------------------------------------------------+
| Diagnosis                                          |
+----------------------------------------------------+
|   PAD (peripheral artery disease) (HCC) - Primary  |
+----------------------------------------------------+
|   Unspecified disorders of arteries and arterioles |
 
+----------------------------------------------------+
|   ESRD (end stage renal disease) (HCC)             |
+----------------------------------------------------+
|   End stage renal disease                          |
+----------------------------------------------------+

## 2019-04-19 NOTE — XMS
Encounter Summary
  Created on: 2019
 
 Cherie Villalobos
 External Reference #: [id number]
 : 48
 Sex: Female
 
 Demographics
 
 
+-----------------------+--------------------------+
| Address               | [address]               |
|                       | [place]  [address] |
+-----------------------+--------------------------+
| Home Phone            | [phone]          |
+-----------------------+--------------------------+
| Preferred Language    | Unknown                  |
+-----------------------+--------------------------+
| Marital Status        |                   |
+-----------------------+--------------------------+
| Mandaeism Affiliation | 1013                     |
+-----------------------+--------------------------+
| Race                  | Unknown                  |
+-----------------------+--------------------------+
| Ethnic Group          | Unknown                  |
+-----------------------+--------------------------+
 
 
 Author
 
 
+--------------+-----------------------+
| Author       | [email or web address] |
+--------------+-----------------------+
| Organization | [facility] |
+--------------+-----------------------+
| Address      | Unknown               |
+--------------+-----------------------+
| Phone        | Unavailable           |
+--------------+-----------------------+
 
 
 
 Support
 
 
+---------------+--------------+---------------------+-----------------+
| Name          | Relationship | Address             | Phone           |
+---------------+--------------+---------------------+-----------------+
| Anoop Villalobos | ECON         | [place], | [phone] |
|               |              |  [place]  [address]     |                 |
+---------------+--------------+---------------------+-----------------+
| Jennifer Dickson | ECON         | [place]       | [phone] |
|               |              | [address]               |                 |
+---------------+--------------+---------------------+-----------------+
 
 
 
 
 Care Team Providers
 
 
+-----------------------+------+-----------------+
| Care Team Member Name | Role | Phone           |
+-----------------------+------+-----------------+
| Ivy Couch DO      | PCP  | [phone] |
+-----------------------+------+-----------------+
 
 
 
 Encounter Details
 
 
+--------+------------+----------------------+-----------+-------------+
| Date   | Type       | Department           | Care Team | Description |
+--------+------------+----------------------+-----------+-------------+
| / | Procedure  |   [facility]    |           |             |
| 2019   | Pass       | [facility] [address]   |           |             |
|        |            | Floor [address] [place] |           |             |
|        |            |   [address]     |           |             |
|        |            | [place] [address]   |           |             |
|        |            | [phone]         |           |             |
+--------+------------+----------------------+-----------+-------------+
 
 
 
 Social History
 
 
+---------------+------------+-----------+--------+------------------+
| Tobacco Use   | Types      | Packs/Day | Years  | Date             |
|               |            |           | Used   |                  |
+---------------+------------+-----------+--------+------------------+
| Former Smoker | Cigarettes | 1         | 30     | Quit: 2004 |
+---------------+------------+-----------+--------+------------------+
 
 
 
+---------------------+---+---+---+
| Smokeless Tobacco:  |   |   |   |
| Never Used          |   |   |   |
+---------------------+---+---+---+
 
 
 
+------------------------------+
| Comments: quite smoking  |
+------------------------------+
 
 
 
+-------------+-----------+---------+----------+
| Alcohol Use | Drinks/We | oz/Week | Comments |
|             | ek        |         |          |
+-------------+-----------+---------+----------+
| No          |           |         |          |
+-------------+-----------+---------+----------+
 
 
 
 
+------------------+---------------+
| Sex Assigned at  | Date Recorded |
| Birth            |               |
+------------------+---------------+
| Not on file      |               |
+------------------+---------------+
 as of this encounter
 
 Plan of Treatment
 
 
+--------+----------+-----------------+----------------------+-------------+
| Date   | Type     | Specialty       | Care Team            | Description |
+--------+----------+-----------------+----------------------+-------------+
| / | Initial  | General Surgery |   Сергей Medeiros, |             |
| 2019   | consult  |                 |  [address]  |             |
|        |          |                 |  [place] [address]  |             |
|        |          |                 |  [phone]        |             |
|        |          |                 | [phone] (Fax)   |             |
+--------+----------+-----------------+----------------------+-------------+
 as of this encounter
 
 Visit Diagnoses
 Not on filein this encounter

## 2019-04-19 NOTE — XMS
Encounter Summary
  Created on: 2019
 
 Cherie Villalobos
 External Reference #: [id number]
 : 48
 Sex: Female
 
 Demographics
 
 
+-----------------------+--------------------------+
| Address               | [address]               |
|                       | [place]  [address] |
+-----------------------+--------------------------+
| Home Phone            | [phone]          |
+-----------------------+--------------------------+
| Preferred Language    | Unknown                  |
+-----------------------+--------------------------+
| Marital Status        |                   |
+-----------------------+--------------------------+
| Restorationist Affiliation | [id number]                     |
+-----------------------+--------------------------+
| Race                  | Unknown                  |
+-----------------------+--------------------------+
| Ethnic Group          | Unknown                  |
+-----------------------+--------------------------+
 
 
 Author
 
 
+--------------+-----------------------+
| Author       | Sherry Kutuan |
+--------------+-----------------------+
| Organization | [facility] |
+--------------+-----------------------+
| Address      | Unknown               |
+--------------+-----------------------+
| Phone        | Unavailable           |
+--------------+-----------------------+
 
 
 
 Support
 
 
+---------------+--------------+---------------------+-----------------+
| Name          | Relationship | Address             | Phone           |
+---------------+--------------+---------------------+-----------------+
| Anoop Villalobos | ECON         | [place], | [phone] |
|               |              |  [place]  [address]     |                 |
+---------------+--------------+---------------------+-----------------+
| Jennifer Dickson | ECON         | [place]       | [phone] |
|               |              | [id number]               |                 |
+---------------+--------------+---------------------+-----------------+
 
 
 
 
 Care Team Providers
 
 
+-----------------------+------+-----------------+
| Care Team Member Name | Role | Phone           |
+-----------------------+------+-----------------+
| Ivy Couch DO      | PCP  | [phone] |
+-----------------------+------+-----------------+
 
 
 
 Reason for Visit
 
 
+-----------+------------------------------------------------------------------+
| Reason    | Comments                                                         |
+-----------+------------------------------------------------------------------+
| Follow-up | Here today for follow up for right toes, left foot. States that  |
|           | there is a spot on the top of the amputation that hasn't fully   |
|           | closed up. Has been having a lot of phantom pain in the right    |
|           | foot. Has been taking tramadol. Pain 4/10 currently can get to   |
|           | 8/10.                                                            |
+-----------+------------------------------------------------------------------+
 
 
 
 Encounter Details
 
 
+--------+---------+----------------------+----------------------+----------------------+
| Date   | Type    | Department           | Care Team            | Description          |
+--------+---------+----------------------+----------------------+----------------------+
| / | Office  |   [facility] |   Srinivasan Jordan,  | Closed nondisplaced  |
| 2019   | Visit   |  and Ankle  [address]      | DPM  [address], | fracture of distal   |
|        |         | [address]   |  [address]  [place],  | phalanx of right     |
|        |         | [place], [place]         | [place] [address]             | great toe with       |
|        |         | [address]           | [phone]         | routine healing,     |
|        |         | [phone]         | [phone] (Fax)   | subsequent encounter |
|        |         |                      |                      |  (Primary Dx);       |
|        |         |                      |                      | Post-operative       |
|        |         |                      |                      | state; Open wound of |
|        |         |                      |                      |  toe, subsequent     |
|        |         |                      |                      | encounter            |
+--------+---------+----------------------+----------------------+----------------------+
 
 
 
 Social History
 
 
+---------------+------------+-----------+--------+------------------+
| Tobacco Use   | Types      | Packs/Day | Years  | Date             |
|               |            |           | Used   |                  |
+---------------+------------+-----------+--------+------------------+
| Former Smoker | Cigarettes | 1         | 30     | Quit: 2004 |
+---------------+------------+-----------+--------+------------------+
 
 
 
 
+---------------------+---+---+---+
| Smokeless Tobacco:  |   |   |   |
| Never Used          |   |   |   |
+---------------------+---+---+---+
 
 
 
+------------------------------+
| Comments: quite smoking  |
+------------------------------+
 
 
 
+-------------+-----------+---------+----------+
| Alcohol Use | Drinks/We | oz/Week | Comments |
|             | ek        |         |          |
+-------------+-----------+---------+----------+
| No          |           |         |          |
+-------------+-----------+---------+----------+
 
 
 
+------------------+---------------+
| Sex Assigned at  | Date Recorded |
| Birth            |               |
+------------------+---------------+
| Not on file      |               |
+------------------+---------------+
 as of this encounter
 
 Last Filed Vital Signs
 
 
+-------------------+---------+-------------------------+
| Vital Sign        | Reading | Time Taken              |
+-------------------+---------+-------------------------+
| Blood Pressure    | 109/52  | 2019 11:53 AM PST |
+-------------------+---------+-------------------------+
| Pulse             | 71      | 2019 11:53 AM PST |
+-------------------+---------+-------------------------+
| Temperature       | -       | -                       |
+-------------------+---------+-------------------------+
| Respiratory Rate  | -       | -                       |
+-------------------+---------+-------------------------+
| Oxygen Saturation | 96%     | 2019 11:53 AM PST |
+-------------------+---------+-------------------------+
| Inhaled Oxygen    | -       | -                       |
| Concentration     |         |                         |
+-------------------+---------+-------------------------+
| Weight            | -       | -                       |
+-------------------+---------+-------------------------+
| Height            | -       | -                       |
+-------------------+---------+-------------------------+
| Body Mass Index   | -       | -                       |
+-------------------+---------+-------------------------+
 in this encounter
 
 Progress Notes
 Srinivasan Jordan, REBEKAH - 2019  1:45 PM PSTFormatting of this note may be different fro
m the original.
 
  
 Subjective: 
  Patient ID: Cherie Villalobos is a 69 y.o. female.
 Chief Complaint 
 Patient presents with 
   Follow-up 
   Here today for follow up for right toes, left foot. States that there is a spot on the to
p of the amputation that hasn't fully closed up. Has been having a lot of phantom pain in th
e right foot. Has been taking tramadol. Pain 4/10 currently can get to 8/10. 
  
 
 HPI
 Patient returns today follow-up right great toe wound right foot
 Transmetatarsal amputation left foot remains healed.  She is in the process of obtaining ne
w diabetic shoes and inserts.
 
 The following portions of the patient's history were reviewed and updated as appropriate an
d is available elsewhere in the record: allergies, current medications, past family history,
 past medical history, past social history, past surgical history and problem list.
 
 ROS
 Denies any nausea, vomiting, fever, chills, shortness of breath, chest pain, or calf pain 
 
     
 Objective: 
 /52  | Pulse 71  | SpO2 96% 
 General observation:
 Constitutional: The patient is alert and oriented to person, place and time
 Psychiatric: The patient has normal affect and mood; her speech is normal; her behavior is 
normal.
 Respiratory: Effort normal and breath sounds normal. No accessory muscle usage. No respirat
ory distress.   
 
 Lower Extremity Examination:
 Trans-metatarsal amputation left, site healing well, two very small superficial wounds
 Wound, irregular and linear dorsal right great toe, no erythema, mild edema of toe, improve
d
 Toenail right great is thickened, may come loose over time
 No skin mottling. No hyperesthesias or paresthesias.
 No calf or thigh pain. Negative Homans sign. 
 No pain on palpation of the interphalangeal joint of the hallux right dorsal
 
 Xr Toe Right 2 View
 
 Result Date: 2019
 No radiographic evidence of osteomyelitis seen.  If further assessment is warranted, consid
er correlation with MRI. Potential acute fracture through the base of the distal phalanx. Si
gned by: Gavin South Sign Date/Time: 2019 5:15 PM
 
 Us Lower Extremty  Arterial Right
 
 Result Date: 2019
 1. Right leg shows biphasic inflow with a greater than 50% stenosis at the origin of the dobbins
perficial femoral artery ([id number]).  Biphasic outflow. 2. Two vessel runoff to the right jeanne
t. Signed by: Eugenio Hoffman Sign Date/Time: 2019 3:11 PM
 
 Radiographs: .  See epic chart for complete read
 
 X-ray Foot Left
 
 
 Result Date: 2018
 Amputation of the left forefoot at the level of the proximal metatarsals. Surgical cutaneou
s staples are present. No radiographic evidence for osteomyelitis. Normal alignment of the h
indfoot. Mild degeneration of the midfoot. Electronically signed by Oleksandr Lemus MD on 2018 10:12 AM
 
 Ct Head Without Contrast
 
 Result Date: 2018
 1.  No acute intracranial pathology. Electronically signed by Steven Samano MD on  5:28 PM
 
 X-ray Chest 1 View
 
 Result Date: 2019
 1.  Small loculated pleural fluid collection seen overlying the lateral left heart border i
n the lower left chest.  There may also be a small pleural effusion at the right lung base w
hich is layering posteriorly. 2.  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 Electronically s
igned by Eugenio Hoffman DO on 2019 4:59 PM
 
 Angioplasty 18:
 1. Aorta with narrow aortic bifurcation with moderate stenosis of proximal left common errol
c artery. 
 2. Left SFA with moderate stenosis proximally. 
 3. Single vessel runoff to left foot via left anterior tibial artery. 
 4. Left posterior tibial artery and peroneal artery were occluded with reconstitution of di
stal posterior tibial artery at the left ankle via collaterals. 
 5. If forefoot amputation does not heal, may need popliteal to posterior tibial artery bypa
ss versus antegrade access of left common femoral artery with posterior tibial artery recana
lization. 
  
 Electronically signed by Kirt Mclaughlin MD on 2018 10:51 AM 
 
    
 Assessment and Plan: 
 S/p: TMA left on 18
 DM, ESRD, PAD
 Open wound right great toe
 
 Rx mupirocin
 Continue use of surgical shoe right, removable cast boot left until diabetic shoes and inse
rts obtained
 Then transition to the use
 Follow-up in 1 month
 
 Srinivasan Joradn DPM
  in this encounter
 
 Plan of Treatment
 
 
+--------+----------+-----------------+----------------------+-------------+
| Date   | Type     | Specialty       | Care Team            | Description |
+--------+----------+-----------------+----------------------+-------------+
| / | Initial  | General Surgery |   Сергей Medeiros, |             |
|    | consult  |                 |  MD [address]  |             |
|        |          |                 |  [place] [address]  |             |
|        |          |                 |  [phone]        |             |
|        |          |                 | [phone] (Fax)   |             |
+--------+----------+-----------------+----------------------+-------------+
 
 as of this encounter
 
 Visit Diagnoses
 
 
+-----------------------------------------------------------------------------------+
| Diagnosis                                                                         |
+-----------------------------------------------------------------------------------+
|   Closed nondisplaced fracture of distal phalanx of right great toe with routine  |
| healing, subsequent encounter - Primary                                           |
+-----------------------------------------------------------------------------------+
|   Post-operative state                                                            |
+-----------------------------------------------------------------------------------+
|   Other postprocedural status                                                     |
+-----------------------------------------------------------------------------------+
|   Open wound of toe, subsequent encounter                                         |
+-----------------------------------------------------------------------------------+

## 2019-04-19 NOTE — XMS
Encounter Summary
  Created on: 2019
 
 Cherie Villalobos
 External Reference #: [id number]
 : 48
 Sex: Female
 
 Demographics
 
 
+-----------------------+--------------------------+
| Address               | [address]               |
|                       | [place]  [address] |
+-----------------------+--------------------------+
| Home Phone            | [phone]          |
+-----------------------+--------------------------+
| Preferred Language    | Unknown                  |
+-----------------------+--------------------------+
| Marital Status        |                   |
+-----------------------+--------------------------+
| Cheondoism Affiliation | [id number]                     |
+-----------------------+--------------------------+
| Race                  | Unknown                  |
+-----------------------+--------------------------+
| Ethnic Group          | Unknown                  |
+-----------------------+--------------------------+
 
 
 Author
 
 
+--------------+-----------------------+
| Author       | Alba Kapta |
+--------------+-----------------------+
| Organization | [facility] |
+--------------+-----------------------+
| Address      | Unknown               |
+--------------+-----------------------+
| Phone        | Unavailable           |
+--------------+-----------------------+
 
 
 
 Support
 
 
+---------------+--------------+---------------------+-----------------+
| Name          | Relationship | Address             | Phone           |
+---------------+--------------+---------------------+-----------------+
| Anoop Villalobos | ECON         | [place], | [phone] |
|               |              |  [place]  [address]     |                 |
+---------------+--------------+---------------------+-----------------+
| Jennifer Dickson | ECON         | [place]       | [phone] |
|               |              | [id number]               |                 |
+---------------+--------------+---------------------+-----------------+
 
 
 
 
 Care Team Providers
 
 
+-----------------------+------+-----------------+
| Care Team Member Name | Role | Phone           |
+-----------------------+------+-----------------+
| Ivy Couch DO      | PCP  | [phone] |
+-----------------------+------+-----------------+
 
 
 
 Reason for Visit
 
 
+-----------+------------------------------------------------------------------+
| Reason    | Comments                                                         |
+-----------+------------------------------------------------------------------+
| Foot Pain | possible infection in rt foot, was told to come in for a direct  |
|           | admit through the ER                                             |
+-----------+------------------------------------------------------------------+
 Auth/Cert
 
+--------+--------+-----------+--------------+--------------+---------------+
| Status | Reason | Specialty | Diagnoses /  | Referred By  | Referred To   |
|        |        |           | Procedures   | Contact      | Contact       |
+--------+--------+-----------+--------------+--------------+---------------+
|        |        | Internal  |   Diagnoses  |              |   [facility]    |
|        |        | Medicine  |  PVD         |              | [facility]   |
|        |        |           | (peripheral  |              | [address]  [address] |
|        |        |           | vascular     |              |  [address]   |
|        |        |           | disease)     |              | [place], [place]  |
|        |        |           | (HCC)  ESRD  |              | [address]  Phone: |
|        |        |           | (end stage   |              |  [phone] |
|        |        |           | renal        |              |   Fax:        |
|        |        |           | disease) on  |              | [phone]  |
|        |        |           | dialysis     |              |               |
|        |        |           | (HCC)        |              |               |
+--------+--------+-----------+--------------+--------------+---------------+
 
 
 
 
 Encounter Details
 
 
+--------+-----------+----------------------+----------------------+----------------------+
| Date   | Type      | Department           | Care Team            | Description          |
+--------+-----------+----------------------+----------------------+----------------------+
| / | Hospital  |   [facility]    |   [facility],        | PVD (peripheral      |
| 2019 - | Encounter | [address]   | MD Maria Luisa Jarrett      | vascular disease)    |
|        |           | [facility] | [address]           | ([facility]) (Primary Dx);  |
| / |           |   [address]     | [place] [address]   | ESRD (end stage      |
| 2019   |           | [place] [address]   | [phone]         | renal disease) on    |
|        |           | [phone]         | [phone] (Fax)   | dialysis ([facility]);      |
|        |           |                      | Moiz Josue      | Anemia in ESRD       |
|        |           |                      | MD [address] | (end-stage renal     |
|        |           |                      |  Blvd  [place]  | disease) ([facility]);      |
|        |           |                      | [address]  [phone]  | Hypoalbuminemia;     |
|        |           |                      |  [phone] (Fax)  | Electrolyte          |
 
|        |           |                      |                      | imbalance risk       |
+--------+-----------+----------------------+----------------------+----------------------+
 
 
 
 Social History
 
 
+---------------+------------+-----------+--------+------------------+
| Tobacco Use   | Types      | Packs/Day | Years  | Date             |
|               |            |           | Used   |                  |
+---------------+------------+-----------+--------+------------------+
| Former Smoker | Cigarettes | 1         | 30     | Quit: 2004 |
+---------------+------------+-----------+--------+------------------+
 
 
 
+---------------------+---+---+---+
| Smokeless Tobacco:  |   |   |   |
| Never Used          |   |   |   |
+---------------------+---+---+---+
 
 
 
+------------------------------+
| Comments: quite smoking  |
+------------------------------+
 
 
 
+-------------+-----------+---------+----------+
| Alcohol Use | Drinks/We | oz/Week | Comments |
|             | ek        |         |          |
+-------------+-----------+---------+----------+
| No          |           |         |          |
+-------------+-----------+---------+----------+
 
 
 
+------------------+---------------+
| Sex Assigned at  | Date Recorded |
| Birth            |               |
+------------------+---------------+
| Not on file      |               |
+------------------+---------------+
 as of this encounter
 
 Last Filed Vital Signs
 
 
+-------------------+----------------------+-------------------------+
| Vital Sign        | Reading              | Time Taken              |
+-------------------+----------------------+-------------------------+
| Blood Pressure    | 120/56               | 2019 11:40 AM PST |
+-------------------+----------------------+-------------------------+
| Pulse             | 64                   | 2019 11:40 AM PST |
+-------------------+----------------------+-------------------------+
| Temperature       | 36.4   C (97.5   F)  | 2019 11:40 AM PST |
+-------------------+----------------------+-------------------------+
| Respiratory Rate  | 18                   | 2019 11:40 AM PST |
 
+-------------------+----------------------+-------------------------+
| Oxygen Saturation | 97%                  | 2019 11:40 AM PST |
+-------------------+----------------------+-------------------------+
| Inhaled Oxygen    | -                    | -                       |
| Concentration     |                      |                         |
+-------------------+----------------------+-------------------------+
| Weight            | 65.5 kg (144 lb 6.4  | 2019  1:11 PM PST |
|                   | oz)                  |                         |
+-------------------+----------------------+-------------------------+
| Height            | 177.8 cm (5' 10")    | 2019  7:11 PM PST |
+-------------------+----------------------+-------------------------+
| Body Mass Index   | 20.72                | 2019  1:11 PM PST |
+-------------------+----------------------+-------------------------+
 in this encounter
 
 Discharge Summaries
 Prudence Nicholson MD-R2 - 2019  1:04 PM PSTFormatting of this note may be different fr
om the original.
 [facility]
 Service:  Hospitalist
 Resident Discharge Summary
 
 Date of Admission:  2019
 Date of Discharge:  
 
 Discharge Provider:  Prudence Nicholson MD-R2
 Treatment Team: 
 Consulting Physician: Srinivasan Jordan DPM
 Consulting Physician: Ethan Awad MD
 Admitting Provider: Luiza Rosales MD
 Discharge Diagnoses:   
 
 Principal Problem:
   PVD (peripheral vascular disease) ([facility])
 Active Problems:
   ESRD (end stage renal disease) (HCC)
   Type 2 diabetes mellitus with chronic kidney disease on chronic dialysis, with long-term 
current use of insulin (HCC)
   Anemia in ESRD (end-stage renal disease) (HCC)
   Hypertension, essential
   Chronic systolic congestive heart failure (HCC)
   COPD (chronic obstructive pulmonary disease) (HCC)
   Paroxysmal atrial fibrillation (HCC)
   CAD (coronary artery disease)
 Resolved Problems:
   * No resolved hospital problems. *
 
 BRIEF HISTORY OF PRESENTATION:  
      Patient Summary:  Per Dr. Rosales's H and P from 2019: '67 y/o F with h/o ESRD
 on HD T,,Sat (Dr. Asher), DM2, PAD s/p angioplasty of LLE and transmetatarsal amputation 
of left foot 18 by Dr. Jordan due to osteomyelitis, CAD, s/p MI, CABG, AVR , HFrE
F with last EF 20 to 25%, s/p AICD, AF on chronic anticoagulation who was sent to ED by Dr. Elliott for evaluation of right LE. Patient reports that at HD yesterday it was noted that he
r right toes were red and dusky. She went to see Dr. Elliott this morning and he was concerne
d that right toes could be ischemic or infected and referred to ED. '
 
 HOSPITAL COURSE:  
 Vascular surgery was consulted from the emergency department, they did a selective catheter
ization of the left common femoral artery and placed an SMA stent. The patient tolerated the
 procedure well. Podiatry was consulted, they recommended wound care consultation for the op
 
en wound on her left foot. They also stated that her right foot did not need a procedure for
 the toes at this time. Infectious disease is waiting for blood cultures and Gram stain and 
culture of the wound site to narrow her IV antibiotics. She reported improved pain, no short
ness of breath, no nausea and vomiting, she is making a small amount of urine and had a willie
l movement today. She would like to go home. Physical therapy cleared her to go home with [facility] assist. Nephrology was consulted and reported that they were amenable to administering he
r IV antibiotics with dialysis. Infectious disease was consulted and agreed to this plan. Up
on discharge the patient was eating, drinking, urinating, having bowel movements.she was not
 having fevers, nausea, or vomiting. 
 
 No prescriptions prior to admission. 
 
 DISCHARGE EXAM
 Vital Signs:
 /56 (BP Location: Right upper arm)  | Pulse 64  | Temp 97.5 F (36.4 C) (Axillary)
  | Resp 18  | Ht 1.778 m (5' 10")  | Wt 65.5 kg (144 lb 6.4 oz)  | SpO2 97%  | Breastfeedin
g? No  | BMI 20.72 kg/m 
 
 Physical Exam
 General Appearance: Alert and cooperative, sitting up in a chair by the bed and appears to 
be in no acute distress. 
 
 HEENNT: Normocephalic and atraumatic. Hearing grossly intact. No nasal discharge. No trache
al deviation. 
 
 Cardiovascular: Rhythm and rate are regular. 2/6 systolic murmur heard best over the left u
pper sternal border. No gallops or rubs appreciated. Peripheral pulses 2+ on the right. No l
ower extremity edema.
 
 Pulmonary/Chest: Lungs are clear to auscultation bilaterally. Effort is normal. Normal jadon
th sounds. 
 
 Abdominal: Soft, nontender, nondistended. Normoactive bowel sounds. No guarding or rebound 
tenderness. 
 
 Musculoskeletal: Normal range of motion. Normal muscular development. Patient with forefoot
 amputation on the left. Left foot wrapped in new dressing, C/D/I. Right foot with erythema 
over the great big toe and cyanosis over the second toe, improved from prior. 
 
 Neurological: CN II-XII grossly intact. Oriented to person, place, and time. 
 
 Skin: Skin is warm and dry. No rash noted. 
 
 Psychiatric:The patient was able to demonstrate good judgement and reason, without hallucin
ations, abnormal affect or abnormal behaviors during the examination. 
 
 DATA
 
 Recent Labs
 Lab 1938 19
 1543 
 WBC 3.90 3.39* 5.53 
 HGB 9.8* 9.5* 9.4* 
 HCT 30.1* 29.2* 28.4* 
 * 125* 147* 
 NEUTOPHILPCT  --  66.95  --  
 MONOPCT  --  11.96  --  
 
 
 Recent Labs
 Lab 19
 0538 19
 0453 19
 1543 
  140 139 
 K 3.9 4.2 4.0 
 CL 97* 101 100 
 CO2 31 28 29 
 BUN 13 26* 23 
 CREATININE 4.8* 6.8* 6.1* 
 PROT  --   --  6.2* 
 BILITOT  --   --  0.4 
 ALT  --   --  21 
 AST  --   --  24 
 
 Phosphorus:  
 Lab Results 
 Component Value Date 
  PHOS 3.6 2019 
 
 Invalid input(s): LABALBU
 
 Recent Labs
 Lab 19
 0538 
 MG 2.3 
 
 Recent Labs
 Lab 19
 1543 
 CKTOTAL 28* 
 
 Intake/Output Summary (Last 24 hours) at 19 1717
 Last data filed at 19 0850
  Gross per 24 hour 
 Intake              380 ml 
 Output                0 ml 
 Net              380 ml 
 
 Microbiology: Blood cultures - NGTD
 
 Radiology
 Us Lower Extremty  Arterial Right
 
 Result Date: 2019
 1. Right leg shows biphasic inflow with a greater than 50% stenosis at the origin of the dobbins
perficial femoral artery (137309).  Biphasic outflow. 2. Two vessel runoff to the right jeanne
t. Signed by: Eugenio Hoffman Sign Date/Time: 2019 3:11 PM
 
 PLAN
 
 Disposition:  Home
 Condition:  Stable
 Code Status:  Full Code
 
 No discharge procedures on file.
 
 Follow up:
 [facility]
 
 [address] [facility] [id number] [phone]
 Follow up in 3 week(s)
 
 Ivy Couch, DO
 [address], [address]
 [facility] [address]
 [phone]
 
 Ivy Couch, DO
 [address], [address]
 [facility] [address]
 [phone]
 
 In 1 week
 
 Andrés Elliott MD
 [address] [facility] [id number] [phone]
 
 Call office for appointment
 
  
 Medication List 
  
 START taking these medications  
 cefTAZidime 2 g injection
 QTY:  1 each
 Refills:  0
 Commonly known as:  FORTAZ
 Inject 2 g into the muscle After Hemodialysis (see admin instructions) for 7 days.
  
 vancomycin IVPB
 Refills:  0
 Commonly known as:  VANCOCIN
 Inject 750 mg into the vein After Hemodialysis (see admin instructions) for 7 days. Dilute 
in appropriate volume of IV fluid and give by slow IV infusion.
  
  
 CHANGE how you take these medications  
 losartan 25 MG tablet
 QTY:  30 tablet
 Refills:  0
 Commonly known as:  COZAAR
 Take 1 tablet by mouth daily.
 What changed:  how much to take
  
  
 CONTINUE taking these medications  
 * albuterol 108 (90 Base) MCG/ACT inhaler
 Refills:  0
 Commonly known as:  PROVENTIL HFA;VENTOLIN HFA
  
 * VENTOLIN  (90 Base) MCG/ACT inhaler
 Refills:  0
 Generic drug:  albuterol
  
 apixaban 2.5 MG tablet
 
 QTY:  60 tablet
 Refills:  0
 Commonly known as:  ELIQUIS
 Take 1 tablet by mouth 2 (two) times daily.
  
 atorvastatin 40 MG tablet
 QTY:  30 tablet
 Refills:  0
 Commonly known as:  LIPITOR
 Take 1 tablet by mouth nightly.
  
 bisacodyl 10 MG suppository
 Refills:  0
 Commonly known as:  DULCOLAX
  
 calcium acetate 667 MG capsule
 Refills:  0
 Commonly known as:  PHOSLO
  
 carvedilol 12.5 MG tablet
 QTY:  60 tablet
 Refills:  0
 Doctor's comments:  Hold for SBP less than 100
 Hold for HR less than 60
 Commonly known as:  COREG
 Take 1 tablet by mouth 2 (two) times daily with meals.
  
 clopidogrel 75 MG tablet
 QTY:  30 tablet
 Refills:  11
 Commonly known as:  PLAVIX
 Take 1 tablet by mouth daily.
  
 esomeprazole 20 MG capsule
 Refills:  0
 Commonly known as:  NEXIUM
  
 HYDROcodone-acetaminophen 5-325 MG per tablet
 QTY:  30 tablet
 Refills:  0
 Commonly known as:  NORCO
 Take 1 tablet by mouth every 4 (four) hours as needed.
  
 insulin aspart 100 UNIT/ML injection
 Refills:  0
 Commonly known as:  NOVOLOG
  
 insulin degludec 100 UNIT/ML injection
 Refills:  0
 Commonly known as:  TRESIBA
  
 isosorbide mononitrate 60 MG 24 hr tablet
 QTY:  30 tablet
 Refills:  1
 Take 1 tablet by mouth daily.
  
 loperamide 2 MG capsule
 Refills:  0
 Commonly known as:  IMODIUM
  
 
 LORazepam 1 MG tablet
 QTY:  30 tablet
 Refills:  0
 Commonly known as:  ATIVAN
 Take 1 tablet by mouth every 8 (eight) hours as needed for Anxiety.
  
 nitroGLYCERIN 0.4 MG SL tablet
 QTY:  30 tablet
 Refills:  0
 Doctor's comments:  Hold for SBP less than 100
 Commonly known as:  NITROSTAT
 Place 1 tablet under the tongue every 5 (five) minutes as needed for Chest pain.
  
 nortriptyline 25 MG capsule
 Refills:  0
 Commonly known as:  PAMELOR
  
 ondansetron 4 MG disintegrating tablet
 Refills:  0
 Commonly known as:  ZOFRAN-ODT
  
 oxybutynin 5 MG tablet
 Refills:  0
 Commonly known as:  DITROPAN
  
 prochlorperazine 5 MG tablet
 QTY:  60 tablet
 Refills:  11
 Doctor's comments:  Please consider 90 day supplies to promote better adherence
 TAKE ONE TABLET BY MOUTH TWICE DAILY AS NEEDED FOR NAUSEA
  
 ranitidine 150 MG tablet
 Refills:  0
 Commonly known as:  ZANTAC
  
 rOPINIRole 0.25 MG tablet
 Refills:  0
 Commonly known as:  REQUIP
  
 SLOW-MAG 71.5-119 MG Tbec
 Refills:  0
 Generic drug:  Magnesium Cl-Calcium Carbonate
  
 Vitamin D3 5000 units Caps
 Refills:  0
  
 Vortioxetine HBr 10 MG Tabs
 Refills:  0
  
  
 * This list has 2 medication(s) that are the same as other medications prescribed for you. 
Read the directions carefully, and ask your doctor or other care provider to review them wit
h you. 
  
  
  
 You might also be taking other medications not listed above. If you have questions about an
y of your other medications, talk to the person who prescribed them or your Primary Care Pro
vider. 
  
 
  
  
 STOP taking these medications  
 aspirin 81 MG EC tablet
  
  
  
 Where to Get Your Medications 
  
 Information about where to get these medications is not yet available  
 Ask your nurse or doctor about these medications
  cefTAZidime 2 g injection
  vancomycin IVPB
  
 
 Prudence Nicholson MD-R2
 2019
 5:17 PM
 
 
 Associated attestation - Moiz Josue MD - 2019  5:12 PM PSTPatient seen an
d examined along with residents prior to discharge. Discussed with Dr. Elliott and  Dr. Asher. 
Patient will receive IV antibiotics ceftazidime and vancomycin with each HD. She insists on 
going home and is cleared for discharge by Dr. Asher and Dr. Elliott. 
 I agree with the discharge summary of Dr. Nicholson. in this encounter
 
 Discharge Instructions
 Nesha Vanegas RN - 2019Formatting of this note may be different from the original.
 
 Peripheral Angiography
 Peripheral angiography is an outpatient procedure that makes a   map  of the vessels (ar
teries) in your lower body, legs, and arms, using X-ray and dye.This map can show where bloo
d flow may be blocked.
 Talk with your healthcare provider about the risks and complications of angiography. 
 Before the procedure
 Prepare for the peripheral angiography as follows:
  Tell your healthcare provider about all medicines you take and any allergies you may hav
e.
  Follow any directions you  re given for not eating or drinking before the procedure. If
 your provider says to take your normal medicines, swallow them with only small sips of wate
r.
  Arrange for a family member or friend to drive you home.
 During the procedure
 Here is what to expect:
  
 You may get medicine through an IV (intravenous) line to relax you. You  re given an injec
tion to numb the insertion site. Then, a tiny skin cut (incision) is made near an artery in 
your groin.
  Your provider inserts a thin tube (catheter) through the incision. He or she then thread
s the catheter into an artery while looking at a video monitor.
  Contrast   dye  is injected into the catheter to confirm position. You may feel warmt
h or pressure in your legs and back. You lie still as X-rays are taken. The catheter is then
 taken out.
 After the procedure
 You  ll be taken to a recovery area. A healthcare provider will apply pressure to the site
 for about 10 minutes. Your healthcare provider will tell you how long to lie down and keep 
the insertion site still.Your healthcare provider will discuss the results with you soon a
fter the procedure.
 Back at home
 On the day you get home, don  t drive, don  t exercise, avoid walking and taking stairs, 
 
and avoid bending and lifting. Your healthcare provider may give you other care instructions
.
 Call your healthcare provider
 Call your healthcare provider right away if:
  You notice a lump or bleeding at the insertion site
  You feel pain at the insertion site
  You become lightheaded or dizzy
  You have leg pain or numbness
  You do not urinate in 8 hours 
 Date Last Reviewed: 2016-2018 [employer]. [address] [place] [address]. All righ
ts reserved. This information is not intended as a substitute for professional medical care.
 Always follow your healthcare professional's instructions.
 
 Peripheral Arterial Angioplasty and Stent
 
 Peripheral arterial angioplasty is a procedure done to treat a narrowed or blocked artery i
n your arm or leg. This brings blood flow back to your arm or leg. It also helps ease sympto
ms. Sometimes a metal mesh tube called a stent may be put in your artery. This holds your ar
dirk open. The procedure is done by a specially trained doctor called an interventional radi
ologist. This sylvia doctor trained and certified by the American Board of Radiology to use m
inimally invasive image-guided procedures to diagnose and treat diseases.
 Before your procedure
 Follow any instructions you are given on how to get ready. This can include following any d
irections you  re given for not eating or drinking before the procedure.
 Tell your healthcare provider if you:
  Are allergic to X-ray dye (contrast medium) or other medicines
  Are breastfeeding
  Are pregnant or think you may be pregnant
  Have had any recent illnesses
  Have any medical conditions
 Tell your healthcare provider about any medicines you are taking. You may need to stop taki
ng all or some of these before the procedure. This includes:
  All prescription medicines
  Herbs, vitamins, and other supplements
  Over-the-counter medicines such as aspirin or ibuprofen
  Street drugs
 During your procedure
  An IV line is put into your vein. This is to give you fluids and medicines. You may be g
iven medicine to help you relax and make you sleepy (sedation). Medicine will be put on your
 skin where the cut (incision) will be done. This is to keep you from feeling pain at the si
te.
  A very small incision is made at the insertion site. A thin, flexible tube (catheter) is
 put through the incision into your artery. The radiologist watches the catheter  s movemen
t on a screen.
  X-ray dye is injected through the catheter into your artery. This helps to see the arter
y more clearly on the X-rays. The radiologist uses these images as a guide. He or she moves 
the catheter to the narrowed or blocked part of your artery.
  When the catheter reaches the narrowed or blocked area, the radiologist inflates a speci
al balloon attached to the catheter. This is called angioplasty. Inflating the balloon widen
s the passage through the artery.
  Sometimes the artery won't stay open after the angioplasty. In this case, a stent is nee
ded. A catheter with a stent attached is threaded through the artery. When the stent is in t
he right place, it is opened.
  When the procedure is done, all catheters and balloons are removed. The stent stays in p
lace. Pressure is put on the insertion site for 15 minutes to stop bleeding.
 After your procedure
  Your healthcare provider will tell you how long to lie down and keep the insertion site 
still.
  You may stay in the hospital for a few hours or overnight.
 
  Drink plenty of fluids to help flush the X-ray dye from your body.
  After you go home, care for the insertion site as directed.
  You may need to take aspirin or a blood-thinning medicine (anticoagulant) after the proc
edure. This is to help prevent blood clots in the stent. Talk with your healthcare provider 
about this.
 Possible risks and complications
 All procedures have some risk. Possible risks of peripheral arterial angioplasty and stent 
include:
  Bleeding or infection at the catheter insertion site
  Damage to the artery, including worsening of the blockage
  Problems because of X-ray dye, these include allergic reaction or kidney damage
  Artery becomes blocked again (restenosis), which often happens within 6 to 18 months
  Low-level exposure to X-ray radiation, which is considered a safe level 
 Date Last Reviewed: 2017-2018 [employer]. [address] [place] [address], [place] [id number]. All righ
ts reserved. This information is not intended as a substitute for professional medical care.
 Always follow your healthcare professional's instructions.
 
 I certify that Cherie Villalobos  is under my care and that I had a face to face encounter on 
19  that meets the physician face to face encounter requirements.  I certify that based
 on my findings , the patient is in need of intermittent skilled services and a plan for fur
nishing these services to include, but not limited to the services listed in the questions b
elow:  
 Primary diagnosis for home health: PVD: wound care/ foot infection
 Additional diagnosis: 
 Services needed: wound care for right and left foot
 Patient is homebound because he/she cannot leave home without assistance of another individ
ual and leaving the home requires a considerable and taxing effort. Patient needs the assist
ance of another individual to leave home because:  Limited mobiltiy, fall risk, pain with mo
[facility]
 Physician assuming care for Home Health services: Ivy Couch
 
 in this encounter
 
 Medications at Time of Discharge
 
 
+----------------------+----------------------+----------+---------+----------+-----------+
| Medication           | Sig.                 | Disp.    | Refills | Start    | End Date  |
|                      |                      |          |         | Date     |           |
+----------------------+----------------------+----------+---------+----------+-----------+
|   albuterol          | Inhale 2 puffs into  |          |         |          |           |
| (PROVENTIL           | the lungs every 4    |          |         |          |           |
| HFA;VENTOLIN HFA)    | (four) hours as      |          |         |          |           |
| 108 (90 Base)        | needed for Wheezing. |          |         |          |           |
| MCG/ACT              |                      |          |         |          |           |
| inhalerIndications:  |                      |          |         |          |           |
| states uses 2x       |                      |          |         |          |           |
| monthly average      |                      |          |         |          |           |
+----------------------+----------------------+----------+---------+----------+-----------+
|   apixaban (ELIQUIS) | Take 1 tablet by     |   60     | 0       | 20 |           |
|  2.5 MG tablet       | mouth 2 (two) times  | tablet   |         | 18       |           |
|                      | daily.               |          |         |          |           |
+----------------------+----------------------+----------+---------+----------+-----------+
|   carvedilol (COREG) | Take 1 tablet by     |   60     | 0       | 20 |  |
|  12.5 MG tablet      | mouth 2 (two) times  | tablet   |         | 19       | 0         |
|                      | daily with meals.    |          |         |          |           |
+----------------------+----------------------+----------+---------+----------+-----------+
|   esomeprazole       | Take 1 capsule by    |          |         |          |           |
| (NEXIUM) 40 MG       | mouth every day      |          |         |          |           |
 
| capsule              |                      |          |         |          |           |
+----------------------+----------------------+----------+---------+----------+-----------+
|                      | Take 1 tablet by     |   30     | 0       | 20 |           |
| HYDROcodone-acetamin | mouth every 4 (four) | tablet   |         | 19       |           |
| ophen (NORCO) 5-325  |  hours as needed.    |          |         |          |           |
| MG per tablet        |                      |          |         |          |           |
+----------------------+----------------------+----------+---------+----------+-----------+
|   insulin aspart     | Inject  into the     |          |         |          |           |
| (NOVOLOG) 100        | skin 3 (three) times |          |         |          |           |
| UNIT/ML injection    |  daily before meals. |          |         |          |           |
|                      |  Sliding scale       |          |         |          |           |
+----------------------+----------------------+----------+---------+----------+-----------+
|   insulin degludec   | Inject 21 units      |          |         |          |           |
| (TRESIBA) 100        | every night          |          |         |          |           |
| UNIT/ML injection    |                      |          |         |          |           |
+----------------------+----------------------+----------+---------+----------+-----------+
|   isosorbide         | Take 1 tablet by     |   30     | 1       | 20 |  |
| mononitrate (IMDUR)  | mouth daily.         | tablet   |         | 18       | 9         |
| 60 MG 24 hr tablet   |                      |          |         |          |           |
+----------------------+----------------------+----------+---------+----------+-----------+
|   losartan (COZAAR)  | Take 100 mg by mouth |          |         | 20 |           |
| 100 MG tablet        |  daily.              |          |         | 19       |           |
+----------------------+----------------------+----------+---------+----------+-----------+
|   nitroGLYCERIN      | Place 1 tablet under |   30     | 0       | 20 |  |
| (NITROSTAT) 0.4 MG   |  the tongue every 5  | tablet   |         | 19       | 0         |
| SL tablet            | (five) minutes as    |          |         |          |           |
|                      | needed for Chest     |          |         |          |           |
|                      | pain.                |          |         |          |           |
+----------------------+----------------------+----------+---------+----------+-----------+
|   nortriptyline      | Take 25-75 mg by     |          |         |          |           |
| (PAMELOR) 25 MG      | mouth See Admin      |          |         |          |           |
| capsule              | Instructions. Takes  |          |         |          |           |
|                      | 25 mg by mouth every |          |         |          |           |
|                      |  morning and 75 mg   |          |         |          |           |
|                      | every night          |          |         |          |           |
+----------------------+----------------------+----------+---------+----------+-----------+
|   ondansetron        | Take 4 mg by mouth   |          |         | 20 |           |
| (ZOFRAN-ODT) 4 MG    | every 8 (eight)      |          |         | 18       |           |
| disintegrating       | hours as needed.     |          |         |          |           |
| tablet               |                      |          |         |          |           |
+----------------------+----------------------+----------+---------+----------+-----------+
|   oxybutynin         | Take 7.5 mg by mouth |          |         |          |           |
| (DITROPAN) 5 MG      |  2 (two) times       |          |         |          |           |
| tablet               | daily.               |          |         |          |           |
+----------------------+----------------------+----------+---------+----------+-----------+
|   prochlorperazine 5 | TAKE ONE TABLET BY   |   60     | 11      | 20 |           |
|  MG tablet           | MOUTH TWICE DAILY AS | tablet   |         | 19       |           |
|                      |  NEEDED FOR NAUSEA   |          |         |          |           |
+----------------------+----------------------+----------+---------+----------+-----------+
|   rOPINIRole         | Take 0.75 mg by      |          |         |          |           |
| (REQUIP) 0.25 MG     | mouth nightly.       |          |         |          |           |
| tablet               |                      |          |         |          |           |
+----------------------+----------------------+----------+---------+----------+-----------+
|   TRINTELLIX 20 MG   | Take 20 mg by mouth  |          |         | 20 |           |
| TABS                 | every evening.       |          |         | 19       |           |
+----------------------+----------------------+----------+---------+----------+-----------+
|   cefTAZidime        | Inject 2 g into the  |   1 each |         | 20 |  |
| (FORTAZ) 2 g         | muscle After         |          |         | 19       | 9         |
| injection            | Hemodialysis (see    |          |         |          |           |
|                      | admin instructions)  |          |         |          |           |
 
|                      | for 7 days.          |          |         |          |           |
+----------------------+----------------------+----------+---------+----------+-----------+
|   vancomycin         | Inject 750 mg into   |          | 0       | 20 |  |
| (VANCOCIN) IVPB      | the vein After       |          |         | 19       | 9         |
|                      | Hemodialysis (see    |          |         |          |           |
|                      | admin instructions)  |          |         |          |           |
|                      | for 7 days. Dilute   |          |         |          |           |
|                      | in appropriate       |          |         |          |           |
|                      | volume of IV fluid   |          |         |          |           |
|                      | and give by slow IV  |          |         |          |           |
|                      | infusion.            |          |         |          |           |
+----------------------+----------------------+----------+---------+----------+-----------+
|   albuterol          | Ventolin HFA 90      |          |         |          |  |
| (VENTOLIN HFA) 108   | mcg/actuation        |          |         |          | 9         |
| (90 Base) MCG/ACT    | aerosol inhaler      |          |         |          |           |
| inhaler              | Inhale 2 puffs every |          |         |          |           |
|                      |  4 hours by          |          |         |          |           |
|                      | inhalation route as  |          |         |          |           |
|                      | needed.              |          |         |          |           |
+----------------------+----------------------+----------+---------+----------+-----------+
|   atorvastatin       | Take 1 tablet by     |   30     | 0       | 20 |  |
| (LIPITOR) 40 MG      | mouth nightly.       | tablet   |         | 19       | 9         |
| tablet               |                      |          |         |          |           |
+----------------------+----------------------+----------+---------+----------+-----------+
|   bisacodyl          | Place 10 mg          |          |         |          |  |
| (DULCOLAX) 10 MG     | rectally.            |          |         |          | 9         |
| suppository          |                      |          |         |          |           |
+----------------------+----------------------+----------+---------+----------+-----------+
|   calcium acetate    | 667 mg 3 (three)     |          |         | 20 |  |
| (PHOSLO) 667 MG      | times daily with     |          |         | 16       | 9         |
| capsule              | meals.               |          |         |          |           |
+----------------------+----------------------+----------+---------+----------+-----------+
|   Cholecalciferol    | Take 5,000 capsules  |          |         |          |  |
| (VITAMIN D3) 5000    | by mouth every other |          |         |          | 9         |
| UNITS CAPS           |  day.                |          |         |          |           |
+----------------------+----------------------+----------+---------+----------+-----------+
|   clopidogrel        | Take 1 tablet by     |   30     | 11      | 20 |  |
| (PLAVIX) 75 MG       | mouth daily.         | tablet   |         | 18       | 9         |
| tablet               |                      |          |         |          |           |
+----------------------+----------------------+----------+---------+----------+-----------+
|   loperamide         | 2 mg as needed.      |          |         |          |  |
| (IMODIUM) 2 MG       |                      |          |         |          | 9         |
| capsule              |                      |          |         |          |           |
+----------------------+----------------------+----------+---------+----------+-----------+
|   LORazepam (ATIVAN) | Take 1 tablet by     |   30     | 0       | 20 | 02/15/201 |
|  1 MG tablet         | mouth every 8        | tablet   |         | 19       | 9         |
|                      | (eight) hours as     |          |         |          |           |
|                      | needed for Anxiety.  |          |         |          |           |
+----------------------+----------------------+----------+---------+----------+-----------+
|   Magnesium          | Take 1 tablet by     |          |         |          |  |
| Cl-Calcium Carbonate | mouth every other    |          |         |          | 9         |
|  (SLOW-MAG) 71.5-119 | day.                 |          |         |          |           |
|  MG TBEC             |                      |          |         |          |           |
+----------------------+----------------------+----------+---------+----------+-----------+
|   ranitidine         | ranitidine 150 mg    |          |         |          |  |
| (ZANTAC) 150 MG      | tablet Take 1 tablet |          |         |          | 9         |
| tablet               |  twice a day by oral |          |         |          |           |
|                      |  route.              |          |         |          |           |
+----------------------+----------------------+----------+---------+----------+-----------+
|   Vortioxetine HBr   | 10 mg nightly.       |          |         |          |  |
 
| 10 MG TABS           |                      |          |         |          | 9         |
+----------------------+----------------------+----------+---------+----------+-----------+
 as of this encounter
 
 Progress Notes
 Andrés Elliott MD - 2019  7:16 AM PSTFormatting of this note may be different from the 
original.
 
 
 CONSULT NOTE
 2019
 7:16 AM
 
 PRIMARY PHYSICIAN
 Ivy Couch
 
 CONSULT REQUEST FROM
 Moiz Josue MD
 
 HISTORY OF PRESENT ILLNESS
 The patient is a 68 y.o.-year-old female  with history of ESRD on HD via fistula Tue/Thu/Sa
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
 CABG. Recent LLE angioplasty She had been on antibiotics for L foot first digit infection w
ith possible osteomyelitis since November (Oral levofloxacin and clindamycin then transition
ed to IV cefepime with HD until 18, no improvement noted on antibiotics). She had follo
wed with podiatrist and underwent L TMA on 18, intraoperative findings encountered pur
ulence at first metatarsal.
 
 She was Admitted on 18 to [facility] and had resection of the toe.  discha
rged on 19She has problem with tremors on the hands And on and off confusion. Ct head ne
k[place] on 18 EEG done show no seizure just diffuse slowing .
 The patient has some nausea and on and off chest pain stable. The patient felt better was
 discharged on 2019 
 
 readmitted on 19 and discharged on 19 due to chest pain and noted to be stable.
 
 
 Wound culture shows skin sherwin.She had 20days off iv vancomycin and 16 days of ceftazidime 
Had 4 days of cefepime.Orignally was to complete to iv vancmycin 500 mg iv and ceftazidime 2
 gm iv after each hemodialysis unitluntil 18 for the infected foot. Tbut was stopped as
 the line of resection was free of infecton . And off antibiotics since 19
 
 
 The patient developed new right foot lesion which occurred between  until 2019. Patient has memory problems does not know what happened but the right foot is swo
llen and red and tender will need to be taken care of as soon as possible cultures will be d
one. We will have MRSA screen CBC CMP ESR CRP blood culture  2
 
 cultures of the right foot done and right ankle done
 
 will have patient go to [facility] ER for evaluation of ischemic right foot versus severe infec
tion of the right fourth toe.
 
 Wound examination right leg with 21 x 5 cm abrasions ankle with open wound big toe right fo
ot with circumference of 10 cm with blister of 0.5 x 0.2 cm with redness of 5 cm. Second toe
 of the right foot with pallor of 4 x 2 cm.Left foot with amputation site healing well 11 x 
1 cm
 
 I have spoken to Dr. Srinivasan Jordan podiatrist will see her when the patient is admitted to 
[facility].
 
 
 Patient will go to the emergency room to see Dr. Saenz whom have coordinated her to be see
n there will have arterial studies of the right leg is concern for ischemic will be on vanco
mycin, ceftazidme
 
 And metronidazole. 
 ADmitted to [facility] on 19
 Due to concern with Right footinfection.
 infection, thus an infectious disease (ID) consult done for further
 evaluation and management.
 Day 3 of vancomycin, metronidazole and ceftazidime
 S/p  Ultrasound guided access of left common femoral artery
 Aortogram SElective catheterization of right common femoral artery with right leg runoff.Ri
ght SFA stenting using 6x80 mm self expanding stent
 Drug eluting balloon angioplasty of right SFA using 4x100 mm Lutonix balloon
 The patient feeling better today 
 Pending culture no growth will complete one more week o
 Ceftazidime and vancomycin 
 wound care with silver sulfadiazine cream. 
 Past Medical History 
 Diagnosis Date 
   Acute MI ([facility])  
  with stenting x 1 
   Anemia associated with chronic renal failure 2016 
   Atrial fibrillation ([facility])  
   Chronic kidney disease  
   Congestive heart failure ([facility])  
   COPD (chronic obstructive pulmonary disease) ([facility])  
   COPD (chronic obstructive pulmonary disease) ([facility]) 2018 
   Coronary artery disease  
  stent placements: 3 total 
   Depression  
   Diabetes other 
  abstracted 
   Diabetes mellitus, type 2 ([facility])  
   ESRD on peritoneal dialysis ([facility])  
   GERD (gastroesophageal reflux disease)  
   Hemodialysis patient ([facility]) 10/2016 
  Stopped peritoneal and restarted hemodialysis 
   Hemorrhage of gastrointestinal tract, unspecified 2017 
  low GI, rectal bleeding 
   High blood pressure other 
  abstracted 
   High cholesterol other 
  abstracted 
   Hyperlipidemia  
   Hypertension  
   Hyponatremia 2017 
   Myocardial infarction ([facility]) 2017 
   Other chronic pain  
  feet,  
   Pain of left hip joint 2016 
  due to hip fracture and replacement 
   Partial small bowel obstruction ([facility]) 2017 
  resolved 
   Renal failure  
  Stage 5.   Fistula in the JAN - not on dialysis yet. 
   Unspecified visual disturbance  
  glasses 
 
 
 Past Surgical History 
 Procedure Laterality Date 
   AV FISTULA PLACEMENT   
  left upper arm AV fistula - failed 
   AV FISTULA REPAIR N/A 10/25/2016 
  Procedure: AV FISTULA - GRAFT REPAIR/REVISION;  Surgeon: Kirt Mclaughlin MD;  Location: [facility]
IN OR;  Service: Vascular;  Laterality: N/A;  Superficialization and revision of left arm fi
stula 
   CARDIAC CATHETERIZATION  2017 
   CARPAL TUNNEL RELEASE Bilateral other 
  abstracted 
   CATARACT EXTRACTION Bilateral  
   CATHETER REMOVAL N/A 2016 
  Procedure: DIALYSIS CATHETER - REMOVAL;  Surgeon: Kirt Mclaughlin MD;  Location: [facility] OR; 
 Service: Vascular;  Laterality: N/A;  PD cath removal 
   CHOLECYSTECTOMY  other 
  abstracted 
   COLONOSCOPY   
   CORONARY ARTERY BYPASS GRAFT   
   CORONARY STENT PLACEMENT  2017 
  Drug Elutin stents to OM, 1 stent to prox. LAD 
   EYE SURGERY   
   FOOT AMPUTATION Left 2018 
  Procedure: FOREFOOT - AMPUTATION;  Surgeon: Srinivasan Jordan DPM;  Location: [facility] MAIN OR;
  Service: Podiatry;  Laterality: Left; 
   HARDWARE PRESENT   
  stent placements x 3, Left hip replacement, vascular stents 2 in left leg 
   HIP ARTHROPLASTY Left 2016 
  Procedure: HIP - ARTHROPLASTY(ALLISON);  Surgeon: Uriel Roa MD;  Location: [facility] MAIN 
OR;  Service: Orthopedics;  Laterality: Left; 
   HYSTERECTOMY  1998 
  complete 
   PACEMAKER INSERTION   
  [employer] pacemaker/defib 
   PERITONEAL CATHETER INSERTION  8/15/2013 
   PERITONEAL CATHETER INSERTION N/A 2016 
  Procedure: LAPAROSCOPIC - PERITONEAL DIALYSIS CATH INSERTION;  Surgeon: Kirt Mclaughlin MD;  Lo
cation: [facility] MAIN OR;  Service: Vascular;  Laterality: N/A;  Removal of old catheter 
   SHOULDER SURGERY Right  
  frozen shoulder 
   UNLISTED PROCEDURE ARTHROSCOPY   
  total Left hip 
   vascular stents Left 2017 
  leg (2 stents) 
   VASCULAR SURGERY   
 
 Current Facility-Administered Medications 
 Medication Dose Route Frequency Provider Last Rate Last Dose 
   acetaminophen (TYLENOL) tablet 650 mg  650 mg Oral Q6H PRN Luiza Rosales MD     
  Or 
   acetaminophen (TYLENOL) suppository 650 mg  650 mg Rectal Q6H PRN Luiza Rosales MD
     
   albuterol (PROVENTIL HFA;VENTOLIN HFA) inhaler  2 puff Inhalation Q4H PRN Luiza jhaveri MD     
   apixaban (ELIQUIS) tablet 2.5 mg  2.5 mg Oral BID Prudence Nicholson MD-R2   2.5 mg at 2051 
   aspirin EC tablet 81 mg  81 mg Oral Daily with breakfast Kirt Mclaughlin MD   81 mg at  1400 
   atorvastatin (LIPITOR) tablet 40 mg  40 mg Oral Nightly Luiza Rosales MD   40 mg a
t 19 
 
   calcium acetate (PHOSLO) capsule 667 mg  667 mg Oral TID  Luiza Rosales MD   667
 mg at 19 
   carvedilol (COREG) tablet 12.5 mg  12.5 mg Oral BID KAYLEE Rosales MD   12.5 mg 
at 19 
   cefTAZidime (FORTAZ) 2 g in sodium chloride (IV) 0.9 % 50 mL IVPB  2 g Intravenous Afte
r Hemodialysis (see admin instructions) Andrés Elliott MD     
   cholecalciferol (VITAMIN D-3) tablet 5,000 Units  5,000 Units Oral Daily Luiza rivas MD   5,000 Units at 19 1400 
   clopidogrel (PLAVIX) tablet 75 mg  75 mg Oral Daily Kirt Mclaughlin MD   75 mg at 19 1
401 
   dextrose 10 % infusion   Intravenous Continuous PRN Luiza Rosales MD     
   dextrose 50 % solution 12 mL  12 mL Intravenous PRN Luiza Rosales MD     
   dextrose 50 % solution 25 mL  25 mL Intravenous PRN Luiza Rosales MD     
   famotidine (PEPCID) tablet 10 mg  10 mg Oral Daily Luiza Rosales MD   10 mg at  1401 
   fentaNYL (SUBLIMAZE) injection 25 mcg  25 mcg Intravenous Q1H PRN Kirt Mclaughlin MD     
  Or 
   fentaNYL (SUBLIMAZE) injection 50 mcg  50 mcg Intravenous Q1H PRN Kirt Mclaughlin MD     
   glucagon (GLUCAGEN) injection 0.5 mg  0.5 mg Intramuscular PRN Luiza Rosales MD   
  
   glucagon (GLUCAGEN) injection 1 mg  1 mg Intramuscular PRN Luiza Rosales MD     
   HYDROcodone-acetaminophen (NORCO) 5-325 MG per tablet 1 tablet  1 tablet Oral Q4H PRN STEVEN Mclaughlin MD   1 tablet at 19 0626 
  Or 
   HYDROcodone-acetaminophen (NORCO)  MG per tablet 1 tablet  1 tablet Oral Q4H PRN 
Kirt Mclaughlin MD   1 tablet at 19 
   HYDROmorphone (DILAUDID) injection 0.5 mg  0.5 mg Intravenous Q3H PRN Luiza Rosales MD     
  Or 
   HYDROmorphone (DILAUDID) injection 1 mg  1 mg Intravenous Q3H PRN Luiza Rosales MD
     
   insulin glargine (LANTUS) injection 15 Units  15 Units Subcutaneous Nightly Luiza doran MD   15 Units at 19 
   insulin lispro (human) (HUMALOG) injection 0-3 Units  0-3 Units Subcutaneous Nightly 
leonel Rosales MD   1 Units at 19 
   insulin lispro (human) (HUMALOG) injection 0-6 Units  0-6 Units Subcutaneous TID AC She
kelley Rosales MD   1 Units at 19 171 
   isosorbide mononitrate (IMDUR) 24 hr tablet 60 mg  60 mg Oral Daily Luiza Rosales MD   60 mg at 19 1402 
   loperamide (IMODIUM) capsule 2 mg  2 mg Oral 4x Daily PRN Luiza Rosales MD     
   LORazepam (ATIVAN) tablet 1 mg  1 mg Oral Q8H PRN Luiza Rosales MD   1 mg at  0839 
   metroNIDAZOLE (FLAGYL) tablet 500 mg  500 mg Oral 3 times per day Andrés Elliott MD   500 
mg at 19 0557 
   nitroGLYCERIN (NITROSTAT) SL tablet 0.4 mg  0.4 mg Sublingual Q5 Min PRN Luiza rivas MD     
   nortriptyline (PAMELOR) capsule 25 mg  25 mg Oral QAM Kirt Mclaughlin MD   Stopped at  1007 
  And 
   nortriptyline (PAMELOR) capsule 75 mg  75 mg Oral Nightly Kirt Mclaughlin MD   75 mg at  
   ondansetron (ZOFRAN-ODT) disintegrating tablet 4 mg  4 mg Oral Q6H PRN Kirt Mclaughlin MD   
  
  Or 
   ondansetron (ZOFRAN) injection 4 mg  4 mg Intravenous Q6H PRN Kirt Mclaughlin MD   4 mg at 0
19 223 
   oxybutynin (DITROPAN) tablet 2.5 mg  2.5 mg Oral BID Luiza Rosales MD   2.5 mg at 
19 
   pantoprazole (PROTONIX) EC tablet 40 mg  40 mg Oral QAM AC Kirt Mclaughlin MD   40 mg at  0557 
 
   rOPINIRole (REQUIP) tablet 0.75 mg  0.75 mg Oral Nightly Luiza Rosales MD   0.75 m
g at 19 
   sodium chloride (PF) 0.9 % flush 10 mL  10 mL Intravenous Q8H Luiza Rosales MD   1
0 mL at 19 1130 
   vancomycin (VANCOCIN) 500 mg in sodium chloride (IV) 0.9 % 100 mL IVPB  500 mg Intraven
ous See Admin Instructions Beatriz Luciano, [facility]     
   Vortioxetine HBr TABS 10 mg  10 mg Oral Nightly Luiza Rosales MD   Stopped at 01/2
4/19 2200 
   zolpidem (AMBIEN) tablet 5 mg  5 mg Oral Nightly PRN Kirt Mclaughlin MD     
 
 Allergies 
 Allergen Reactions 
   Quinapril Hcl Anaphylaxis 
   Digoxin And Related Other (See Comments) 
   toxic 
   Metaxalone Other (See Comments) 
   Morphine Mental Changes 
   Make her crazy/ "funny feeling in my head"
 Has used hydromorphone in-house 
   Pantoprazole Sodium Nausea and Vomiting 
   Penicillins Rash 
   Quinapril Hcl Edema 
   Facial Edema 
   Sudafed [Pseudoephedrine Hcl] Rash 
 
 Social History 
 
 Social History 
   Marital status:  
   Spouse name: N/A 
   Number of children: N/A 
   Years of education: N/A 
 
 Occupational History 
   Not on file. 
 
 Social History Main Topics 
   Smoking status: Former Smoker 
   Packs/day: 1.00 
   Years: 30.00 
   Types: Cigarettes 
   Quit date: 2004 
   Smokeless tobacco: Never Used 
    Comment: quite smoking  
   Alcohol use No 
   Drug use: No 
   Sexual activity: No 
 
 Other Topics Concern 
   Not on file 
 
 Social History Narrative 
  She lives with . 2 kids. Worked in banking 
 
 No smoking. No alcohol intake. Recreational Drug Use
 No Travel
 NO Pets
 Immunization History 
 Administered Date(s) Administered 
   Hepatitis B Adult 2016 
 
   Influenza, Trivalent W/Preservative 2016 
   Pneumococcal Polysaccharide 23-valent 2012 
  
 
 Family History 
 Problem Relation Age of Onset 
   High cholesterol Father  
   Hypertension Father  
   Diabetes Paternal Grandmother  
   Malig hypertherm Neg Hx  
   Kidney disease Neg Hx  
 
 REVIEW OF SYSTEMS
 
 General: The patient noted to have no problem of fever,no weight loss
  and no chills.
 
 Neurologic: Denies any headache, any lightheadedness. No loss of
 
 consciousness or seizure.
 
 Cardiac: Denies Chest Pain, Palpitation, Dyspnea on Exertion
 
 Pulmonary: Denies cough, wheezing and hemoptysis
 
 GASTROINTESTINAL: Denies nausea vomiting, and diarrhea.
 GENITOURINARY: Noted no dysuria, urgency, or frequency.
 Hematological : Denies any ecchymosis, bleeding or hematuria
 
 Endocrine: Denies any polyphagia, polyuria or hot and cold intolerance
  Musculoskeletal: no new joint pain and swelling. 
 Skin : Denies any new rashes or cutaneous changes.
 
 Rest of the review of systems is unremarkable.
 
 PHYSICAL EXAMINATION
 
 VITAL SIGNS 
 Wt Readings from Last 3 Encounters: 
 19 65.5 kg (144 lb 6.4 oz) 
 19 65.5 kg (144 lb 6.4 oz) 
 18 68.7 kg (151 lb 7.3 oz) 
 
 Temp Readings from Last 3 Encounters: 
 19 98.2 F (36.8 C) (Oral) 
 19 97.7 F (36.5 C) (Axillary) 
 19 98.3 F (36.8 C) (Oral) 
 
 BP Readings from Last 3 Encounters: 
 19 105/58 
 19 108/56 
 01/10/19 115/54 
 
 Pulse Readings from Last 3 Encounters: 
 19 56 
 19 68 
 01/10/19 59 
 
 Head:  Normocephalic atraumatic
 
 
 HEENT: Pink palpebral conjunctivae. Anicteric sclerae. No
 tonsillopharyngeal congestion.
 
 Neck : Noted no  Cervical Lymphadenopathy
 
 CHEST:Clear Breath Sounds Bilateral 
 
 HEART: Regular rate and rhythm. No murmurs thrills or rubs
 
 ABDOMEN: Soft, flat. No tenderness. No hepatosplenomegaly.
 
 GROIN AREA: Negative  for intertrigo.
 
 EXTREMITIES: upper extremity no edema
 Left foot with dressing
 Right foot with wounds less pale and red.
  
 
 NEUROLOGIC: No localizing signs.
 
 Skin : with  ecchymosis. 
 
 LABORATORY DATA
 
 Lab Results 
 Component Value Date 
  WBC 3.90 2019 
  HGB 9.8 (L) 2019 
  HCT 30.1 (L) 2019 
   (L) 2019 
  CHOL 101 2017 
  TRIG 132 2017 
  HDL 34 (L) 2017 
  ALT 21 2019 
  AST 24 2019 
   2019 
  K 3.9 2019 
  CL 97 (L) 2019 
  CREATININE 4.8 (H) 2019 
  BUN 13 2019 
  CO2 31 2019 
  TSH 1.14 2017 
  INR 1.0 2018 
  GLUF 169 (H) 2019 
  HGBA1C 7.5 (H) 2018 
 
 Hepatic Function Panel:  
 Lab Results 
 Component Value Date 
  PROT 6.2 (L) 2019 
  ALB 2.7 (L) 2019 
  BILITOT 0.4 2019 
  BILIDIR 0.1 2017 
   2019 
  AST 24 2019 
  ALT 21 2019 
  
 
 Lipase: 
 Lab Results 
 
 Component Value Date 
  LIPASE 332 2017 
  
 U/A:  
 Lab Results 
 Component Value Date 
  COLORU YELLOW 2011 
  CLARITYU Slightly Cloudy 10/16/2018 
  MEROPENEM 1.009 2013 
  LEUKOCYTESUR  10/16/2018 
    Comment: 
    small 
  NITRITE Negative 10/16/2018 
  UROBILINOGEN Normal 10/16/2018 
  UPRO  10/16/2018 
    Comment: 
    100 
  UPRO 100 2013 
  PHUR 8 10/16/2018 
  BLOODU Negative 10/16/2018 
  KETONES negative 10/16/2018 
  BILIRUBINUR Negative 10/16/2018 
  GLUCOSEU  10/16/2018 
    Comment: 
    moderate 
  
 DIAGNOSTIC IMAGING
 
 CAT scan 
 
 Ultrasound
   
 X-ray Foot Left
 
 Result Date: 2018
 Amputation of the left forefoot at the level of the proximal metatarsals. Surgical cutaneou
s staples are present. No radiographic evidence for osteomyelitis. Normal alignment of the h
indfoot. Mild degeneration of the midfoot. Electronically signed by Oleksandr Lemus MD on 2018 10:12 AM
 
 Ct Head Without Contrast
 
 Result Date: 2018
 1.  No acute intracranial pathology. Electronically signed by Steven Samano MD on  5:28 PM
 
 X-ray Chest 1 View
 
 Result Date: 2019
 1.  Small loculated pleural fluid collection seen overlying the lateral left heart border i
n the lower left chest.  There may also be a small pleural effusion at the right lung base w
hich is layering posteriorly. 2.  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 Electronically s
igned by Eugenio Hoffman DO on 2019 4:59 PM
 
 Us Lower Extremty  Arterial Right
 
 Result Date: 2019
 1. Right leg shows biphasic inflow with a greater than 50% stenosis at the origin of the dobbins
perficial femoral artery (137-309).  Biphasic outflow. 2. Two vessel runoff to the right jeanne
t. Signed by: Eugenio Hoffman Sign Date/Time: 2019 3:11 PM
 
 
 Cherie Villalobos is a 68 y.o. female with the following Problems 
 Patient Active Problem List 
 Diagnosis 
   Proteinuria 
   Vitamin D deficiency 
   Secondary hyperparathyroidism (HCC) 
   Recurrent UTI 
   Coronary artery disease involving native coronary artery of native heart without angina
 pectoris 
   Depression 
   ESRD (end stage renal disease) (HCC) 
   Type 2 diabetes mellitus with chronic kidney disease on chronic dialysis, with long-ter
m current use of insulin (HCC) 
   Anemia in ESRD (end-stage renal disease) (HCC) 
   Hypertension, essential 
   Dyslipidemia 
   Gastroesophageal reflux disease without esophagitis 
   Diabetic foot ulcer (HCC) 
   Loss of weight 
   Dysphagia, unspecified 
   Foot ulcer due to DM  (HCC) 
   Severe protein-calorie malnutrition (HCC) 
   Osteomyelitis of left foot (HCC) 
   Pleural effusion 
   Prolonged Q-T interval on ECG 
   Chronic systolic congestive heart failure (HCC) 
   Chronic diarrhea 
   Chronic anticoagulation 
   Plantar ulcer (HCC) 
   Cardiomyopathy (HCC) 
   COPD (chronic obstructive pulmonary disease) (HCC) 
   ICD (implantable cardioverter-defibrillator) in place 
   Implantable defibrillator reprogramming/check 
   Mixed hyperlipidemia 
   Moderate protein-calorie malnutrition (HCC) 
   Paroxysmal atrial fibrillation (HCC) 
   S/P CABG x 2 
   S/P mitral valve repair 
   Steroid-induced hyperglycemia 
   Stress hyperglycemia 
   Chronic multifocal osteomyelitis of left foot (HCC) 
   PVD (peripheral vascular disease) (HCC) 
   CAD (coronary artery disease) 
   Chest pain 
 
 Plan:
 
 Silver sulfadiazene cream 2x day on the wound. 
 Follow up  Blood culture 2x on different site.
 Follow up  Gram Stain and Culture
 Continue with iv ceftazidime, vancomycin for one week 
 Follow up in one week.
 Discussed with Dr. Josue hospitalist  who agreed and will proceed
 As plan.
 Thank you very much for the consult.
 ______________________
 
 ______________________
 MD Jayson FRANKLIN Christopher D, [facility] - 2019 11:00 AM PSTFormatting of this note may be different
 from the original.
 Vancomycin Monitoring
 
 S:  Pharmacy to dose vancomycin per protocol.  Diagnosis:  Cellulitis.
 O: 
 Lab Results 
 Component Value Date/Time 
  CREATININE 6.8 (H) 2019 04:53 AM 
  WBC 3.39 (L) 2019 04:53 AM 
 
  Wt= 65.5 kg, CrCl= HD T,Th,S. Tmax afeb,
  Cultures= Bloodx2 pending.  Other Abx= ceftazidime, metronidazole. 
 A:  Trough goal:  10- 20 ug/mL
 P: Continue vancomycin per protocol. No levels needed at this time. Will continue to monito
r. 
 
 Pharmacist: Prudence Alcantar MD-R2 - 2019  6:55 AM PSTFormatting of this note may be different fr
om the original.
 [facility]
 Service: Hospitalist
 Resident Progress Note
 
 Hospital Day:  LOS: 1 day 
 Consultants: Treatment Team: 
 Consulting Physician: Srinivasan Jordan DPM
 Consulting Physician: Kirt Mclaughlin MD
 Consulting Physician: Ethan Awad MD
 Admitting Provider: Luiza Rosales MD
 SUBJECTIVE
 
      Patient Summary:  Per Dr. Rosales's H and P from 2019: '67 y/o F with h/o ESRD
 on HD T,TH,Sat (Dr. Asher), DM2, PAD s/p angioplasty of LLE and transmetatarsal amputation 
of left foot 18 by Dr. Jordan due to osteomyelitis, CAD, s/p MI, CABG, AVR , HFrE
F with last EF 20 to 25%, s/p AICD, AF on chronic anticoagulation who was sent to ED by Dr. Elliott for evaluation of right LE.  Patient reports that at HD yesterday it was noted that her
 right toes were red and dusky.  She went to see Dr. Elliott this morning and he was concerned 
that right toes could be ischemic or infected and referred to ED. '
 Vascular surgery was consulted from the emergency department, they did a selective catheter
ization of the left common femoral artery and placed a stent. The patient tolerated the proc
edure well.
      Events Overnight:  
  - No acute overnight events. Afebrile, vital signs stable. The patient reports her pain is
 markedly improved, she states the color is also better to her foot. She has had no other pa
in, no shortness of breath, no nausea or vomiting. Her last bowel movement was this morning.
 Medications: Fentanyl, Norco, Ativan, Versed  2
 
 Scheduled Medications   aspirin  81 mg Oral Daily with breakfast 
   atorvastatin  40 mg Oral Nightly 
   calcium acetate  667 mg Oral TID WC 
   carvedilol  12.5 mg Oral BID WC 
   cefTAZidime  2 g Intravenous After Hemodialysis (see admin instructions) 
   cholecalciferol  5,000 Units Oral Daily 
   clopidogrel  75 mg Oral Daily 
   famotidine  10 mg Oral Daily 
   insulin glargine  15 Units Subcutaneous Nightly 
   insulin lispro (human)  0-3 Units Subcutaneous Nightly 
   insulin lispro (human)  0-6 Units Subcutaneous TID AC 
 
   isosorbide mononitrate  60 mg Oral Daily 
   metroNIDAZOLE  500 mg Oral 3 times per day 
   nortriptyline  25 mg Oral QAM 
  And 
   nortriptyline  75 mg Oral Nightly 
   oxybutynin  2.5 mg Oral BID 
   pantoprazole  40 mg Oral QAM AC 
   rOPINIRole  0.75 mg Oral Nightly 
   sodium chloride (PF)  10 mL Intravenous Q8H 
   vancomycin  500 mg Intravenous See Admin Instructions 
   Vortioxetine HBr  10 mg Oral Nightly 
 
 Continuous Infusions 
   dextrose   
 
 PRN Medications
 acetaminophen **OR** acetaminophen, albuterol, dextrose, dextrose, dextrose, fentaNYL **OR*
* fentaNYL, glucagon, glucagon, HYDROcodone-acetaminophen **OR** HYDROcodone-acetaminophen, 
HYDROmorphone **OR** HYDROmorphone, loperamide, LORazepam, nitroGLYCERIN, ondansetron **OR**
 ondansetron, zolpidem
 
 OBJECTIVE
 Vital Signs:
 /56 (BP Location: Left leg)  | Pulse 77  | Temp 98.5 F (36.9 C) (Oral)  | Resp 16
  | Ht 1.778 m (5' 10")  | Wt 65.5 kg (144 lb 6.4 oz)  | SpO2 98%  | BMI 20.72 kg/m 
 
 Physical Exam
 
 General Appearance: Alert and cooperative, sitting up in bed receiving dialysis and appears
 to be in no acute distress. 
 
 HEENNT: Normocephalic and atraumatic. Hearing grossly intact. No nasal discharge. No trache
al deviation. 
 
 Cardiovascular: Rhythm and rate are regular. 2/6 systolic murmur heard best over the left u
pper sternal border. No gallops or rubs appreciated. Peripheral pulses 1+ on the right. No l
ower extremity edema.
 
 Pulmonary/Chest: Lungs are clear to auscultation bilaterally. Effort is normal. Normal jadon
th sounds. 
 
 Abdominal: Soft, nontender, nondistended. Normoactive bowel sounds. No guarding or rebound 
tenderness. 
 
 Musculoskeletal: Normal range of motion. Normal muscular development. Patient with forefoot
 amputation on the left. Left foot wrapped in dressing, C/D/I. Right foot with erythema over
 the great big toe and cyanosis over the second toe, patient stated this is improved from pr
ior. Her right foot is warm to the touch and has palpable dorsalis pedis pulse.
 
 Neurological: CN II-XII grossly intact. Oriented to person, place, and time. 
 
 Skin: Skin is warm and dry. No rash noted. 
 
 Psychiatric:The patient was able to demonstrate good judgement and reason, without hallucin
ations, abnormal affect or abnormal behaviors during the examination. 
 
 DATA
 
 Recent Labs
 Lab 19
 
 0453 19
 1543 19
 0554 19
 0847 
 WBC 3.39* 5.53 4.08 4.23 
 HGB 9.5* 9.4* 9.7* 8.5* 
 HCT 29.2* 28.4* 29.7* 26.0* 
 * 147* 125* 134* 
 NEUTOPHILPCT 66.95  --   --  74.02 
 MONOPCT 11.96  --   --  7.65 
 
 Recent Labs
 Lab 19
 0453 19
 1543 19
 0554 19
 0847 
  139 138 139 
 K 4.2 4.0 4.4 4.4 
  100 100 99 
 CO2 28 29 28 32 
 BUN 26* 23 15 24 
 CREATININE 6.8* 6.1* 4.9* 6.5* 
 ALB  --  2.7* 2.6* 2.2* 
 PROT  --  6.2* 6.1*  --  
 BILITOT  --  0.4 0.5  --  
 ALT  --    --  
 AST  --  24   --  
 
 Phosphorus:  
 Lab Results 
 Component Value Date 
  PHOS 4.4 2019 
 
 Recent Labs
 Lab 19
 1543 
 CKTOTAL 28* 
 
 Intake/Output Summary (Last 24 hours) at 19 0655
 Last data filed at 19 0341
  Gross per 24 hour 
 Intake              300 ml 
 Output                0 ml 
 Net              300 ml 
 
 Microbiology: Blood cultures pending
 
 Radiology
 Us Lower Extremty  Arterial Right
 
 Result Date: 2019
 1. Right leg shows biphasic inflow with a greater than 50% stenosis at the origin of the dobbins
perficial femoral artery (137-309).  Biphasic outflow. 2. Two vessel runoff to the right jeanne
t. Signed by: Eugenio Hoffman Sign Date/Time: 2019 3:11 PM
 
 PROBLEM LIST
 
 Principal Problem:
   PVD (peripheral vascular disease) ([facility])
 
 Active Problems:
   ESRD (end stage renal disease) ([facility])
   Type 2 diabetes mellitus with chronic kidney disease on chronic dialysis, with long-term 
current use of insulin ([facility])
   Anemia in ESRD (end-stage renal disease) ([facility])
   Hypertension, essential
   Chronic systolic congestive heart failure ([facility])
   COPD (chronic obstructive pulmonary disease) ([facility])
   Paroxysmal atrial fibrillation ([facility])
   CAD (coronary artery disease)
 Resolved Problems:
   * No resolved hospital problems. *
 
 ASSESSMENT & PLAN
 
 Patient Active Hospital Problem List:
  PVD (peripheral vascular disease) ([facility]) (2018)
   Assessment: Sent by ASAF Torrez, to the emergency department. Dr. Mclaughlin was consulted, IR 
angiogram yesterday. She was found to have mesenteric stenosis and PAD. Stent was placed in 
the left common iliac artery. She tolerated the catheterization well, and has improved vascu
lar blood flow to the right foot, although still with some cyanosis of the second toe.
   Plan: 
 Infectious disease consulting, appreciate recommendations
 IV Ceftazidime, vancomycin, metronidazole
 Vascular surgery consulting, appreciate recommendations
 Restarted apixaban (eliquis) today
 Fentanyl, Tylenol, Norco available prn for pain control
 Wound care consulted, appreciate recommendations
 Podiatry has also been consulted
 
  ESRD (end stage renal disease) ([facility]) (2016)
    Anemia in ESRD (end-stage renal disease) ([facility]) (2016)
 Assessment: With dialysis Tuesday, Thursday, Saturday, sees Dr. Asher clinic. She received 
her regular dialysis today without issue.
   Plan: 
 Nephrology consulting, appreciate recommendations.
 Continue home PhosLo, vitamin D
 She receives EPO injections with her dialysis
 
  Type 2 diabetes mellitus with chronic kidney disease on chronic dialysis, with long-term c
urrent use of insulin ([facility]) (2016)
   Assessment: The patient reports she normally takes 21 units of long-acting insulin at nig
ht with an insulin sliding scale at meals. Last hemoglobin A1c 7.5 percent in 2018.
 
   Plan: Lantus 15 units nightly
 Mild insulin sliding scale
 Aspirin, statin
 
  Hypertension, essential (2016)
   Assessment: Patient's blood pressure has been normal, and on the side of low during dialy
sis but with systolic blood pressure greater than 90.
   Plan: 
 Continue home Coreg, Imdur 
 
  Chronic systolic congestive heart failure ([facility]) (2018)
   Assessment: Last echo done 2018 showed severely impaired EF 20-25 percent, mild 
aortic regurgitation, moderate aortic stenosis, mild to moderate mitral regurgitation with m
oderate pulmonary hypertension. 
   Plan:
 Continue home Aspirin, statin, Coreg, Imdur
 
 
  COPD
 Assessment: The patient currently states that this is stable, she is currently satting well
 on room air with no shortness of breath.
 Plan: Albuterol prn
 
  CAD (coronary artery disease) (2018)
   Assessment: She has a history of chest pain with dialysis, however this has not happened 
during this hospitalization. Last catheterization 2018, CABG in 2018. 
   Plan: 
 Continue home Aspirin, statin, Imdur
 
 Continue home for Vortioxetine, nortriptyline, oxybutynin, pantoprazole
 Currently holding losartan
 
 Diet: Diabetic 
 DVT Prophylaxis: Eliquis
 Disposition: Inpatient
 Code Status:  Full Code
 
 Prudence Nicholson MD-R2
 2019
 6:55 AM
 
 
 Associated attestation - Moiz Josue MD - 2019  5:13 PM PSTPatient seen an
d examined along with residents. All labs and notes personally reviewed by me. Patient under
went vascular procedure yesterday with angioplasty and stenting of right SFA. She now has wa
rm right foot indicating resumption of circulation. Patient is on three antibiotics (vanc, F
lagyl and ceftazidime) by Dr. Elliott for right first and second toe cellulitis. No surgical in
tervention needed at this stage for right foot infection. Appreciate help of Dr. Mclaughlin, Dr. Norma clemente and Dr. Jordan. 
 I agree with the progress note of Dr. Nicholson. Abigail Morgan, [facility] - 2019  8:10 PM 
PSTFormatting of this note may be different from the original.
 Clinical Pharmacy Note: Renal Monitoring
 Cherie Villalobos 68 y.o. female
 Ht Readings from Last 1 Encounters: 
 19 1.778 m (5' 10") 
   
 Wt Readings from Last 1 Encounters: 
 19 65.5 kg (144 lb 6.4 oz) 
  
 Serum creatinine: 6.1 mg/dL (H) 19 1543
 Estimated creatinine clearance: 9 mL/min (A)
 Patient has h/o ESRD on HD qTTS
 
 Pharmacy dosing for renal function per Dr. Luiza Rosales.
 
 Currently, there are no medications needing to be adjusted.
 
 Pharmacy will continue to monitor and make changes as needed per renal function. 
 
 Abigail Morgan, [facility]
 PGY-1 Pharmacy Resident
 in this encounter
 
 Plan of Treatment
 
 
+--------+----------+-----------------+----------------------+-------------+
 
| Date   | Type     | Specialty       | Care Team            | Description |
+--------+----------+-----------------+----------------------+-------------+
| / | Initial  | General Surgery |   Сергей Medeiros, |             |
|    | consult  |                 |  MD [address]  |             |
|        |          |                 |  [place] [address]  |             |
|        |          |                 |  [phone]        |             |
|        |          |                 | [phone] (Fax)   |             |
+--------+----------+-----------------+----------------------+-------------+
 as of this encounter
 
 Procedures
 
 
+----------------------+--------+-------------+----------------------+----------------------
+
| Procedure Name       | Priori | Date/Time   | Associated Diagnosis | Comments             
|
|                      | ty     |             |                      |                      
|
+----------------------+--------+-------------+----------------------+----------------------
+
| POCT GLUCOSE         | Routin | 2019  |                      |   Results for this   
|
|                      | e      | 12:10 PM    |                      | procedure are in the 
|
|                      |        | PST         |                      |  results section.    
|
+----------------------+--------+-------------+----------------------+----------------------
+
| POCT GLUCOSE         | Routin | 2019  |                      |   Results for this   
|
|                      | e      |  5:55 AM    |                      | procedure are in the 
|
|                      |        | PST         |                      |  results section.    
|
+----------------------+--------+-------------+----------------------+----------------------
+
| CBC W/AUTO DIFF      | Routin | 2019  |                      |   Results for this   
|
| (REFLEX TO MANUAL)   | e      |  5:38 AM    |                      | procedure are in the 
|
|                      |        | PST         |                      |  results section.    
|
+----------------------+--------+-------------+----------------------+----------------------
+
| PHOSPHOROUS          | Routin | 2019  |                      |   Results for this   
|
|                      | e      |  5:38 AM    |                      | procedure are in the 
|
|                      |        | PST         |                      |  results section.    
|
+----------------------+--------+-------------+----------------------+----------------------
+
| MAGNESIUM            | Routin | 2019  |                      |   Results for this   
|
|                      | e      |  5:38 AM    |                      | procedure are in the 
|
|                      |        | PST         |                      |  results section.    
|
+----------------------+--------+-------------+----------------------+----------------------
 
+
| BASIC METABOLIC      | Routin | 2019  |                      |   Results for this   
|
| PANEL                | e      |  5:38 AM    |                      | procedure are in the 
|
|                      |        | PST         |                      |  results section.    
|
+----------------------+--------+-------------+----------------------+----------------------
+
| POCT GLUCOSE         | Routin | 2019  |                      |   Results for this   
|
|                      | e      |  9:29 PM    |                      | procedure are in the 
|
|                      |        | PST         |                      |  results section.    
|
+----------------------+--------+-------------+----------------------+----------------------
+
| POCT GLUCOSE         | Routin | 2019  |                      |   Results for this   
|
|                      | e      |  4:06 PM    |                      | procedure are in the 
|
|                      |        | PST         |                      |  results section.    
|
+----------------------+--------+-------------+----------------------+----------------------
+
| POCT GLUCOSE         | Routin | 2019  |                      |   Results for this   
|
|                      | e      | 12:13 PM    |                      | procedure are in the 
|
|                      |        | PST         |                      |  results section.    
|
+----------------------+--------+-------------+----------------------+----------------------
+
| EKG STANDARD 12 LEAD | Routin | 2019  |                      |   Results for this   
|
|                      | e      |  6:18 AM    |                      | procedure are in the 
|
|                      |        | PST         |                      |  results section.    
|
+----------------------+--------+-------------+----------------------+----------------------
+
| POCT GLUCOSE         | Routin | 2019  |                      |   Results for this   
|
|                      | e      |  5:21 AM    |                      | procedure are in the 
|
|                      |        | PST         |                      |  results section.    
|
+----------------------+--------+-------------+----------------------+----------------------
+
| CBC W/AUTO DIFF      | Routin | 2019  |                      |   Results for this   
|
| (REFLEX TO MANUAL)   | e - AM |  4:53 AM    |                      | procedure are in the 
|
|                      |        | PST         |                      |  results section.    
|
+----------------------+--------+-------------+----------------------+----------------------
+
| BASIC METABOLIC      | Routin | 2019  |                      |   Results for this   
|
| PANEL                | e - AM |  4:53 AM    |                      | procedure are in the 
 
|
|                      |        | PST         |                      |  results section.    
|
+----------------------+--------+-------------+----------------------+----------------------
+
| POCT GLUCOSE         | Routin | 2019  |                      |   Results for this   
|
|                      | e      |  9:00 PM    |                      | procedure are in the 
|
|                      |        | PST         |                      |  results section.    
|
+----------------------+--------+-------------+----------------------+----------------------
+
| IR STENT FEMORAL     | Routin | 2019  |                      |   Results for this   
|
| POPLITEAL            | e      |  6:45 PM    |                      | procedure are in the 
|
|                      |        | PST         |                      |  results section.    
|
+----------------------+--------+-------------+----------------------+----------------------
+
| IR ANGIOGRAM         | Routin | 2019  |                      |   Results for this   
|
| EXTREMITY RIGHT      | e      |  6:45 PM    |                      | procedure are in the 
|
|                      |        | PST         |                      |  results section.    
|
+----------------------+--------+-------------+----------------------+----------------------
+
| IR AORTAGRAM         | Routin | 2019  |                      |   Results for this   
|
| ABDOMINAL            | e      |  6:45 PM    |                      | procedure are in the 
|
| SERIALOGRAM          |        | PST         |                      |  results section.    
|
+----------------------+--------+-------------+----------------------+----------------------
+
| BLOOD CULTURE, SET 2 | Timed  | 2019  |                      |   Results for this   
|
|                      |        |  6:05 PM    |                      | procedure are in the 
|
|                      |        | PST         |                      |  results section.    
|
+----------------------+--------+-------------+----------------------+----------------------
+
| IR GUIDANCE VASCULAR | Routin | 2019  |                      |   Results for this   
|
|  ACCESS US           | e      |  5:40 PM    |                      | procedure are in the 
|
|                      |        | PST         |                      |  results section.    
|
+----------------------+--------+-------------+----------------------+----------------------
+
| BLOOD CULTURE, SET 1 | Timed  | 2019  |                      |   Results for this   
|
|                      |        |  3:43 PM    |                      | procedure are in the 
|
|                      |        | PST         |                      |  results section.    
|
+----------------------+--------+-------------+----------------------+----------------------
 
+
| SEDIMENTATION RATE,  | STAT   | 2019  |                      |   Results for this   
|
| AUTOMATED            |        |  3:43 PM    |                      | procedure are in the 
|
|                      |        | PST         |                      |  results section.    
|
+----------------------+--------+-------------+----------------------+----------------------
+
| CBC W/MANUAL DIFF    | STAT   | 2019  |                      |   Results for this   
|
|                      |        |  3:43 PM    |                      | procedure are in the 
|
|                      |        | PST         |                      |  results section.    
|
+----------------------+--------+-------------+----------------------+----------------------
+
| C-REACTIVE PROTEIN   | STAT   | 2019  |                      |   Results for this   
|
|                      |        |  3:43 PM    |                      | procedure are in the 
|
|                      |        | PST         |                      |  results section.    
|
+----------------------+--------+-------------+----------------------+----------------------
+
| CK                   | STAT   | 2019  |                      |   Results for this   
|
|                      |        |  3:43 PM    |                      | procedure are in the 
|
|                      |        | PST         |                      |  results section.    
|
+----------------------+--------+-------------+----------------------+----------------------
+
| COMPREHENSIVE        | STAT   | 2019  |                      |   Results for this   
|
| METABOLIC PANEL      |        |  3:43 PM    |                      | procedure are in the 
|
|                      |        | PST         |                      |  results section.    
|
+----------------------+--------+-------------+----------------------+----------------------
+
| US LOWER EXTREMITY   | STAT   | 2019  |                      |   Results for this   
|
| ARTERIAL RIGHT       |        |  2:23 PM    |                      | procedure are in the 
|
|                      |        | PST         |                      |  results section.    
|
+----------------------+--------+-------------+----------------------+----------------------
+
| ED INFORMATION       | Routin | 2019  |                      |   Results for this   
|
| EXCHANGE             | e      |  1:03 PM    |                      | procedure are in the 
|
|                      |        | PST         |                      |  results section.    
|
+----------------------+--------+-------------+----------------------+----------------------
+
 in this encounter
 
 Results
 
 POCT glucose (2019 12:10 PM)
 
+--------------------+--------------------------+---------------+-----------------+
| Component          | Value                    | Ref Range     | Performed At    |
+--------------------+--------------------------+---------------+-----------------+
| GLUCOSE,POC SCREEN | 237 (H)Comment: Testing  | 65 - 99 mg/dL | [facility] |
|                    | [place] [facility] [place]     |               |                 |
|                    | [address];[place], [place]   |               |                 |
|                    | [address]                    |               |                 |
+--------------------+--------------------------+---------------+-----------------+
 
 
 
+-------------------+------------------+--------------------+--------------+
| Performing        | Address          | City/State/Zipcode | Phone Number |
| Organization      |                  |                    |              |
+-------------------+------------------+--------------------+--------------+
|   [facility] |   [address] | [place] [address] |              |
+-------------------+------------------+--------------------+--------------+
 POCT glucose (2019  5:55 AM)
 
+--------------------+--------------------------+---------------+-----------------+
| Component          | Value                    | Ref Range     | Performed At    |
+--------------------+--------------------------+---------------+-----------------+
| GLUCOSE,POC SCREEN | 135 (H)Comment: Testing  | 65 - 99 mg/dL | [facility] |
|                    | performed at [facility] [place]     |               |                 |
|                    | [address];[place]   |               |                 |
|                    | [address]                    |               |                 |
+--------------------+--------------------------+---------------+-----------------+
 
 
 
+-------------------+------------------+--------------------+--------------+
| Performing        | Address          | City/State/Zipcode | Phone Number |
| Organization      |                  |                    |              |
+-------------------+------------------+--------------------+--------------+
|   [facility] |   [address] | [place] [address] |              |
+-------------------+------------------+--------------------+--------------+
 Phosphorus (2019  5:38 AM)
 
+------------+--------------------------+-----------------+--------------+
| Component  | Value                    | Ref Range       | Performed At |
+------------+--------------------------+-----------------+--------------+
| PHOSPHORUS | 3.6Comment: Testing      | 2.3 - 4.8 mg/dL | [facility]   |
|            | performed at [facility], [address] |                 | LABORATORY   |
|            |  [address],        |                 |              |
|            | [place]  [address]     |                 |              |
+------------+--------------------------+-----------------+--------------+
 
 
 
+----------+
| Specimen |
+----------+
| Blood    |
+----------+
 
 
 
+---------------+-------------------------+---------------------+----------------+
 
| Performing    | Address                 | City/State/Zipcode  | Phone Number   |
| Organization  |                         |                     |                |
+---------------+-------------------------+---------------------+----------------+
|   [facility]  |   [address]  | [place] [address] |   [phone] |
| LABORATORY    | [address]                   |                     |                |
+---------------+-------------------------+---------------------+----------------+
 Magnesium (2019  5:38 AM)
 
+-----------+--------------------------+-----------------+--------------+
| Component | Value                    | Ref Range       | Performed At |
+-----------+--------------------------+-----------------+--------------+
| MAGNESIUM | 2.3Comment: Testing      | 1.7 - 2.4 mg/dL | [facility]   |
|           | performed at [facility], [address] |                 | LABORATORY   |
|           |  [facility],        |                 |              |
|           | [place]  [address]     |                 |              |
+-----------+--------------------------+-----------------+--------------+
 
 
 
+----------+
| Specimen |
+----------+
| Blood    |
+----------+
 
 
 
+---------------+-------------------------+---------------------+----------------+
| Performing    | Address                 | City/State/Zipcode  | Phone Number   |
| Organization  |                         |                     |                |
+---------------+-------------------------+---------------------+----------------+
|   [facility]  |   [address]  | [place] [address] |   [phone] |
| LABORATORY    | [address]                   |                     |                |
+---------------+-------------------------+---------------------+----------------+
 Basic metabolic panel (2019  5:38 AM)
 
+----------------+--------------------------+-------------------+--------------+
| Component      | Value                    | Ref Range         | Performed At |
+----------------+--------------------------+-------------------+--------------+
| SODIUM         | 139                      | 135 - 145 mmol/L  | [facility]   |
|                |                          |                   | LABORATORY   |
+----------------+--------------------------+-------------------+--------------+
| POTASSIUM      | 3.9                      | 3.5 - 4.9 mmol/L  | [facility]   |
|                |                          |                   | LABORATORY   |
+----------------+--------------------------+-------------------+--------------+
| CHLORIDE       | 97 (L)                   | 99 - 109 mmol/L   | [facility]   |
|                |                          |                   | LABORATORY   |
+----------------+--------------------------+-------------------+--------------+
| CO2            | 31                       | 23 - 32 mmol/L    | [facility]   |
|                |                          |                   | LABORATORY   |
+----------------+--------------------------+-------------------+--------------+
| ANION GAP AGAP | 15                       | 5 - 20 mmol/L     | [facility]   |
|                |                          |                   | LABORATORY   |
+----------------+--------------------------+-------------------+--------------+
| GLUCOSE        | 169 (H)                  | 65 - 99 mg/dL     | [facility]   |
|                |                          |                   | LABORATORY   |
+----------------+--------------------------+-------------------+--------------+
| BUN            | 13                       | 8 - 25 mg/dL      | [facility]   |
|                |                          |                   | LABORATORY   |
+----------------+--------------------------+-------------------+--------------+
 
| CREATININE     | 4.8 (H)                  | 0.50 - 1.00 mg/dL | [facility]   |
|                |                          |                   | LABORATORY   |
+----------------+--------------------------+-------------------+--------------+
| BUN/CREAT      | 3                        |                   | [facility]   |
|                |                          |                   | LABORATORY   |
+----------------+--------------------------+-------------------+--------------+
| CALCIUM        | 9.4                      | 8.5 - 10.5 mg/dL  | [facility]   |
|                |                          |                   | LABORATORY   |
+----------------+--------------------------+-------------------+--------------+
| EGFR           | 9 (L)Comment: GFR <60:   | >60 mL/min/1.73m2 | [facility]   |
|                | CHRONIC KIDNEY DISEASE,  |                   | LABORATORY   |
|                | IF FOUND OVER A 3 MONTH  |                   |              |
|                | PERIOD.GFR <15: KIDNEY   |                   |              |
|                | FAILURE.FOR       |                   |              |
|                | [employer], MULTIPLY THE  |                   |              |
|                | CALCULATED GFR BY        |                   |              |
|                | 1.210.This eGFR is       |                   |              |
|                | calculated using the     |                   |              |
|                | MDRD IDMS traceable      |                   |              |
|                | equation.Testing         |                   |              |
|                | performed at [facility], [address] |                   |              |
|                |  [facility],        |                   |              |
|                | [place]    [address]   |                   |              |
+----------------+--------------------------+-------------------+--------------+
 
 
 
+----------+
| Specimen |
+----------+
| Blood    |
+----------+
 
 
 
+---------------+-------------------------+---------------------+----------------+
| Performing    | Address                 | City/State/Zipcode  | Phone Number   |
| Organization  |                         |                     |                |
+---------------+-------------------------+---------------------+----------------+
|   [facility]  |   [address]  | [place] [address] |   [phone] |
| LABORATORY    | [address]                   |                     |                |
+---------------+-------------------------+---------------------+----------------+
 CBC w/auto diff (reflex to manual) (2019  5:38 AM)
 
+----------------------+--------------------------------------------------------------------
-----+-------------------+--------------+
| Component            | Value                                                              
     | Ref Range         | Performed At |
+----------------------+--------------------------------------------------------------------
-----+-------------------+--------------+
| WBC                  | 3.90                                                               
     | 3.80 - 11.00 K/uL | [facility]   |
|                      |                                                                    
     |                   | LABORATORY   |
+----------------------+--------------------------------------------------------------------
-----+-------------------+--------------+
| RBC                  | 2.87 (L)                                                           
     | 3.70 - 5.10 M/uL  | [facility]   |
|                      |                                                                    
     |                   | LABORATORY   |
 
+----------------------+--------------------------------------------------------------------
-----+-------------------+--------------+
| HGB                  | 9.8 (L)                                                            
     | 11.3 - 15.5 g/dL  | [facility]   |
|                      |                                                                    
     |                   | LABORATORY   |
+----------------------+--------------------------------------------------------------------
-----+-------------------+--------------+
| HCT                  | 30.1 (L)                                                           
     | 34.0 - 46.0 %     | [facility]   |
|                      |                                                                    
     |                   | LABORATORY   |
+----------------------+--------------------------------------------------------------------
-----+-------------------+--------------+
| MCV                  | 105.1 (H)                                                          
     | 80.0 - 100.0 fl   | [facility]   |
|                      |                                                                    
     |                   | LABORATORY   |
+----------------------+--------------------------------------------------------------------
-----+-------------------+--------------+
| MCH                  | 34.3 (H)                                                           
     | 27.0 - 34.0 pg    | [facility]   |
|                      |                                                                    
     |                   | LABORATORY   |
+----------------------+--------------------------------------------------------------------
-----+-------------------+--------------+
| MCHC                 | 32.7                                                               
     | 32.0 - 35.5 g/dL  | [facility]   |
|                      |                                                                    
     |                   | LABORATORY   |
+----------------------+--------------------------------------------------------------------
-----+-------------------+--------------+
| RDW SD               | 61.7 (H)                                                           
     | 37 - 53 fl        | [facility]   |
|                      |                                                                    
     |                   | LABORATORY   |
+----------------------+--------------------------------------------------------------------
-----+-------------------+--------------+
| PLT                  | 137 (L)                                                            
     | 150 - 400 K/uL    | [facility]   |
|                      |                                                                    
     |                   | LABORATORY   |
+----------------------+--------------------------------------------------------------------
-----+-------------------+--------------+
| MPV                  | 9.5                                                                
     | fl                | [facility]   |
|                      |                                                                    
     |                   | LABORATORY   |
+----------------------+--------------------------------------------------------------------
-----+-------------------+--------------+
| DIFF TYPE            | MANUAL                                                             
     |                   | [facility]   |
|                      |                                                                    
     |                   | LABORATORY   |
+----------------------+--------------------------------------------------------------------
-----+-------------------+--------------+
| Neutrophils Manual   | 63                                                                 
     | %                 | [facility]   |
|                      |                                                                    
     |                   | LABORATORY   |
 
+----------------------+--------------------------------------------------------------------
-----+-------------------+--------------+
| Lymphocytes Manual   | 30                                                                 
     | %                 | [facility]   |
|                      |                                                                    
     |                   | LABORATORY   |
+----------------------+--------------------------------------------------------------------
-----+-------------------+--------------+
| Monocytes Manual     | 7                                                                  
     | %                 | [facility]   |
|                      |                                                                    
     |                   | LABORATORY   |
+----------------------+--------------------------------------------------------------------
-----+-------------------+--------------+
| Neutrophils Absolute | 2.46                                                               
     | 1.90 - 7.40 K/uL  | [facility]   |
|                      |                                                                    
     |                   | LABORATORY   |
+----------------------+--------------------------------------------------------------------
-----+-------------------+--------------+
| Lymphocytes Absolute | 1.17                                                               
     | 1.00 - 3.90 K/uL  | [facility]   |
|                      |                                                                    
     |                   | LABORATORY   |
+----------------------+--------------------------------------------------------------------
-----+-------------------+--------------+
| Monocytes Absolute   | 0.27                                                               
     | 0.00 - 0.80 K/uL  | [facility]   |
|                      |                                                                    
     |                   | LABORATORY   |
+----------------------+--------------------------------------------------------------------
-----+-------------------+--------------+
| MORPHOLOGY           | 1+Comment:                                                         
     |                   | [facility]   |
|                      | ANISO1+HYPONORMAL PLT                                              
     |                   | LABORATORY   |
|                      | MORPHTesting performed                                             
     |                   |              |
|                      | at [facility], [address]                                                     
     |                   |              |
|                      | [address],                                                   
     |                   |              |
|                      | [place]    [address]                                             
     |                   |              |
|                      |Testing performed at [facility], [address], [place]    [phone] |                   |              |
|                      |                                                                    
     |                   |              |
+----------------------+--------------------------------------------------------------------
-----+-------------------+--------------+
 
 
 
+----------+
| Specimen |
+----------+
| Blood    |
+----------+
 
 
 
 
+---------------+-------------------------+---------------------+----------------+
| Performing    | Address                 | City/State/Zipcode  | Phone Number   |
| Organization  |                         |                     |                |
+---------------+-------------------------+---------------------+----------------+
|   [facility]  |   [address]  | [place] [address] |   [phone] |
| LABORATORY    | [address]                   |                     |                |
+---------------+-------------------------+---------------------+----------------+
 POCT glucose (2019  9:29 PM)
 
+--------------------+--------------------------+---------------+-----------------+
| Component          | Value                    | Ref Range     | Performed At    |
+--------------------+--------------------------+---------------+-----------------+
| GLUCOSE,POC SCREEN | 262 (H)Comment: Testing  | 65 - 99 mg/dL | [facility] |
|                    | [place] at [facility] [place]     |               |                 |
|                    | [address];[place]   |               |                 |
|                    | [address]                    |               |                 |
+--------------------+--------------------------+---------------+-----------------+
 
 
 
+-------------------+------------------+--------------------+--------------+
| Performing        | Address          | City/State/Zipcode | Phone Number |
| Organization      |                  |                    |              |
+-------------------+------------------+--------------------+--------------+
|   [facility] |   [address] | [place] [address] |              |
+-------------------+------------------+--------------------+--------------+
 POCT glucose (2019  4:06 PM)
 
+--------------------+--------------------------+---------------+-----------------+
| Component          | Value                    | Ref Range     | Performed At    |
+--------------------+--------------------------+---------------+-----------------+
| GLUCOSE,POC SCREEN | 193 (H)Comment: Testing  | 65 - 99 mg/dL | [facility] |
|                    | performed at [facility] [place]     |               |                 |
|                    | [address];[place]   |               |                 |
|                    | [address]                    |               |                 |
+--------------------+--------------------------+---------------+-----------------+
 
 
 
+-------------------+------------------+--------------------+--------------+
| Performing        | Address          | City/State/Zipcode | Phone Number |
| Organization      |                  |                    |              |
+-------------------+------------------+--------------------+--------------+
|   [facility] |   [address] | [place] [address] |              |
+-------------------+------------------+--------------------+--------------+
 POCT glucose (2019 12:13 PM)
 
+--------------------+--------------------------+---------------+-----------------+
| Component          | Value                    | Ref Range     | Performed At    |
+--------------------+--------------------------+---------------+-----------------+
| GLUCOSE,POC SCREEN | 145 (H)Comment: Testing  | 65 - 99 mg/dL | [facility] |
|                    | performed at [facility] [place]     |               |                 |
|                    | [address];[place]   |               |                 |
|                    | [address]                    |               |                 |
+--------------------+--------------------------+---------------+-----------------+
 
 
 
+-------------------+------------------+--------------------+--------------+
 
| Performing        | Address          | City/State/Zipcode | Phone Number |
| Organization      |                  |                    |              |
+-------------------+------------------+--------------------+--------------+
|   [facility] |   [address] | [place] [address] |              |
+-------------------+------------------+--------------------+--------------+
 EKG STANDARD 12 LEAD (2019  6:18 AM)
 
+-------------------+--------------------------+-----------+--------------+
| Component         | Value                    | Ref Range | Performed At |
+-------------------+--------------------------+-----------+--------------+
| Ventricular Rate  | 73                       | BPM       | [facility]     |
+-------------------+--------------------------+-----------+--------------+
| Atrial Rate       | 73                       | BPM       | [facility]     |
+-------------------+--------------------------+-----------+--------------+
| P-R Interval      | 146                      | ms        | [facility]     |
+-------------------+--------------------------+-----------+--------------+
| QRS Duration      | 128                      | ms        | [facility]     |
+-------------------+--------------------------+-----------+--------------+
| Q-T Interval      | 464                      | ms        | [facility]     |
+-------------------+--------------------------+-----------+--------------+
| QTC Calculation   | 511                      | ms        | [facility]     |
| (Bezet)           |                          |           |              |
+-------------------+--------------------------+-----------+--------------+
| Calculated P Axis | 70                       | degrees   | [facility]     |
+-------------------+--------------------------+-----------+--------------+
| Calculated R Axis | -38                      | degrees   | [facility]     |
+-------------------+--------------------------+-----------+--------------+
| Calculated T Axis | 9                        | degrees   | [facility]     |
+-------------------+--------------------------+-----------+--------------+
| Diagnosis         | Normal sinus             |           | [facility]     |
|                   | rhythmPossible Left      |           |              |
|                   | atrial enlargementLeft   |           |              |
|                   | axis                     |           |              |
|                   | deviationNon-specific    |           |              |
|                   | intra-ventricular        |           |              |
|                   | conduction blockCannot   |           |              |
|                   | rule out Septal infarct  |           |              |
|                   | , age                    |           |              |
|                   | undeterminedAbnormal     |           |              |
|                   | ECGWhen compared with    |           |              |
|                   | ECG of 2019       |           |              |
|                   | 05:50,Minimal criteria   |           |              |
|                   | for Septal infarct are   |           |              |
|                   | now PresentConfirmed by  |           |              |
|                   | EN FRANK ([id number]) on   |           |              |
|                   | 2019 12:03:10 PM    |           |              |
+-------------------+--------------------------+-----------+--------------+
 
 
 
+--------------+-------------------+--------------------+--------------+
| Performing   | Address           | City/State/[facility] | Phone Number |
| Organization |                   |                    |              |
+--------------+-------------------+--------------------+--------------+
|   [facility]   |   [address] | [place] [address] |              |
+--------------+-------------------+--------------------+--------------+
 POCT glucose (2019  5:21 AM)
 
+--------------------+--------------------------+---------------+-----------------+
| Component          | Value                    | Ref Range     | Performed At    |
 
+--------------------+--------------------------+---------------+-----------------+
| GLUCOSE,POC SCREEN | 142 (H)Comment: Testing  | 65 - 99 mg/dL | [facility] |
|                    | performed at [facility] [place]     |               |                 |
|                    | [address] [facility];[place], [place]   |               |                 |
|                    | [address]                    |               |                 |
+--------------------+--------------------------+---------------+-----------------+
 
 
 
+-------------------+------------------+--------------------+--------------+
| Performing        | Address          | City/State/Zipcode | Phone Number |
| Organization      |                  |                    |              |
+-------------------+------------------+--------------------+--------------+
|   [facility] |   [address] | [place] [address] |              |
+-------------------+------------------+--------------------+--------------+
 Basic metabolic panel (2019  4:53 AM)
 
+----------------+--------------------------+-------------------+--------------+
| Component      | Value                    | Ref Range         | Performed At |
+----------------+--------------------------+-------------------+--------------+
| SODIUM         | 140                      | 135 - 145 mmol/L  | [facility]   |
|                |                          |                   | LABORATORY   |
+----------------+--------------------------+-------------------+--------------+
| POTASSIUM      | 4.2                      | 3.5 - 4.9 mmol/L  | [facility]   |
|                |                          |                   | LABORATORY   |
+----------------+--------------------------+-------------------+--------------+
| CHLORIDE       | 101                      | 99 - 109 mmol/L   | [facility]   |
|                |                          |                   | LABORATORY   |
+----------------+--------------------------+-------------------+--------------+
| CO2            | 28                       | 23 - 32 mmol/L    | [facility]   |
|                |                          |                   | LABORATORY   |
+----------------+--------------------------+-------------------+--------------+
| ANION GAP AGAP | 15                       | 5 - 20 mmol/L     | [facility]   |
|                |                          |                   | LABORATORY   |
+----------------+--------------------------+-------------------+--------------+
| GLUCOSE        | 157 (H)                  | 65 - 99 mg/dL     | [facility]   |
|                |                          |                   | LABORATORY   |
+----------------+--------------------------+-------------------+--------------+
| BUN            | 26 (H)                   | 8 - 25 mg/dL      | [facility]   |
|                |                          |                   | LABORATORY   |
+----------------+--------------------------+-------------------+--------------+
| CREATININE     | 6.8 (H)                  | 0.50 - 1.00 mg/dL | [facility]   |
|                |                          |                   | LABORATORY   |
+----------------+--------------------------+-------------------+--------------+
| BUN/CREAT      | 4                        |                   | [facility]   |
|                |                          |                   | LABORATORY   |
+----------------+--------------------------+-------------------+--------------+
| CALCIUM        | 9.3                      | 8.5 - 10.5 mg/dL  | [facility]   |
|                |                          |                   | LABORATORY   |
+----------------+--------------------------+-------------------+--------------+
| EGFR           | 6 (L)Comment: GFR <60:   | >60 mL/min/1.73m2 | [facility]   |
|                | CHRONIC KIDNEY DISEASE,  |                   | LABORATORY   |
|                | IF FOUND OVER A 3 MONTH  |                   |              |
|                | PERIOD.GFR <15: KIDNEY   |                   |              |
|                | FAILURE.FOR       |                   |              |
|                | [employer], MULTIPLY THE  |                   |              |
|                | CALCULATED GFR BY        |                   |              |
|                | 1.210.This eGFR is       |                   |              |
|                | calculated using the     |                   |              |
|                | MDRD IDMS traceable      |                   |              |
 
|                | equation.Testing         |                   |              |
|                | performed at [facility], [address] |                   |              |
|                |  [facility],        |                   |              |
|                | [place]    [address]   |                   |              |
+----------------+--------------------------+-------------------+--------------+
 
 
 
+----------+
| Specimen |
+----------+
| Blood    |
+----------+
 
 
 
+---------------+-------------------------+---------------------+----------------+
| Performing    | Address                 | City/State/Zipcode  | Phone Number   |
| Organization  |                         |                     |                |
+---------------+-------------------------+---------------------+----------------+
|   [facility]  |   [address]  | [place] [address] |   [phone] |
| LABORATORY    | [address]                   |                     |                |
+---------------+-------------------------+---------------------+----------------+
 CBC w/auto diff (reflex to manual) (2019  4:53 AM)
 
+-----------------+--------------------------+-------------------+--------------+
| Component       | Value                    | Ref Range         | Performed At |
+-----------------+--------------------------+-------------------+--------------+
| WBC             | 3.39 (L)                 | 3.80 - 11.00 K/uL | [facility]   |
|                 |                          |                   | LABORATORY   |
+-----------------+--------------------------+-------------------+--------------+
| RBC             | 2.78 (L)                 | 3.70 - 5.10 M/uL  | [facility]   |
|                 |                          |                   | LABORATORY   |
+-----------------+--------------------------+-------------------+--------------+
| HGB             | 9.5 (L)                  | 11.3 - 15.5 g/dL  | [facility]   |
|                 |                          |                   | LABORATORY   |
+-----------------+--------------------------+-------------------+--------------+
| HCT             | 29.2 (L)                 | 34.0 - 46.0 %     | [facility]   |
|                 |                          |                   | LABORATORY   |
+-----------------+--------------------------+-------------------+--------------+
| MCV             | 104.7 (H)                | 80.0 - 100.0 fl   | [facility]   |
|                 |                          |                   | LABORATORY   |
+-----------------+--------------------------+-------------------+--------------+
| MCH             | 34.0                     | 27.0 - 34.0 pg    | [facility]   |
|                 |                          |                   | LABORATORY   |
+-----------------+--------------------------+-------------------+--------------+
| MCHC            | 32.5                     | 32.0 - 35.5 g/dL  | [facility]   |
|                 |                          |                   | LABORATORY   |
+-----------------+--------------------------+-------------------+--------------+
| RDW SD          | 63.0 (H)                 | 37 - 53 fl        | [facility]   |
|                 |                          |                   | LABORATORY   |
+-----------------+--------------------------+-------------------+--------------+
| PLT             | 125 (L)                  | 150 - 400 K/uL    | [facility]   |
|                 |                          |                   | LABORATORY   |
+-----------------+--------------------------+-------------------+--------------+
| MPV             | 9.4                      | fl                | [facility]   |
|                 |                          |                   | LABORATORY   |
+-----------------+--------------------------+-------------------+--------------+
| DIFF TYPE       | AUTOMATED                |                   | [facility]   |
|                 |                          |                   | LABORATORY   |
 
+-----------------+--------------------------+-------------------+--------------+
| NEUTROPHILS     | 66.95                    | %                 | [facility]   |
|                 |                          |                   | LABORATORY   |
+-----------------+--------------------------+-------------------+--------------+
| LYMPHOCYTES     | 20.34                    | %                 | [facility]   |
|                 |                          |                   | LABORATORY   |
+-----------------+--------------------------+-------------------+--------------+
| MONOCYTES       | 11.96                    | %                 | [facility]   |
|                 |                          |                   | LABORATORY   |
+-----------------+--------------------------+-------------------+--------------+
| EOSINOPHILS     | 0.04                     | %                 | [facility]   |
|                 |                          |                   | LABORATORY   |
+-----------------+--------------------------+-------------------+--------------+
| BASOPHILS       | 0.71                     | %                 | [facility]   |
|                 |                          |                   | LABORATORY   |
+-----------------+--------------------------+-------------------+--------------+
| NEUTROPHILS ABS | 2.27                     | 1.90 - 7.40 K/uL  | [facility]   |
|                 |                          |                   | LABORATORY   |
+-----------------+--------------------------+-------------------+--------------+
| LYMPHOCYTES ABS | 0.69 (L)                 | 1.00 - 3.90 K/uL  | [facility]   |
|                 |                          |                   | LABORATORY   |
+-----------------+--------------------------+-------------------+--------------+
| MONOCYTES ABS   | 0.41                     | 0.00 - 0.80 K/uL  | [facility]   |
|                 |                          |                   | LABORATORY   |
+-----------------+--------------------------+-------------------+--------------+
| EOSINOPHILS ABS | 0.00                     | 0.00 - 0.50 K/uL  | [facility]   |
|                 |                          |                   | LABORATORY   |
+-----------------+--------------------------+-------------------+--------------+
| BASOPHILS ABS   | 0.02Comment: Testing     | 0.00 - 0.10 K/uL  | [facility]   |
|                 | performed at [facility], [address] |                   | LABORATORY   |
|                 |  [address],        |                   |              |
|                 | [place]  [address]     |                   |              |
+-----------------+--------------------------+-------------------+--------------+
 
 
 
+----------+
| Specimen |
+----------+
| Blood    |
+----------+
 
 
 
+---------------+-------------------------+---------------------+----------------+
| Performing    | Address                 | City/State/Zipcode  | Phone Number   |
| Organization  |                         |                     |                |
+---------------+-------------------------+---------------------+----------------+
|   [facility]  |   [address]  | [place] [address] |   [phone] |
| LABORATORY    | [address]                   |                     |                |
+---------------+-------------------------+---------------------+----------------+
 POCT glucose (2019  9:00 PM)
 
+--------------------+--------------------------+---------------+-----------------+
| Component          | Value                    | Ref Range     | Performed At    |
+--------------------+--------------------------+---------------+-----------------+
| GLUCOSE,POC SCREEN | 148 (H)Comment: Testing  | 65 - 99 mg/dL | [facility] |
|                    | performed at [facility] [place]     |               |                 |
|                    | [address];[place], [place]   |               |                 |
|                    | [address]                    |               |                 |
 
+--------------------+--------------------------+---------------+-----------------+
 
 
 
+-------------------+------------------+--------------------+--------------+
| Performing        | Address          | City/State/Zipcode | Phone Number |
| Organization      |                  |                    |              |
+-------------------+------------------+--------------------+--------------+
|   [facility] |   [address] | [place] [address] |              |
+-------------------+------------------+--------------------+--------------+
 IR stent femoral popliteal (2019  6:45 PM)
 
+------------------------------------------------------------------------+--------------+
| Impressions                                                            | Performed At |
+------------------------------------------------------------------------+--------------+
|      1. Aorta and iliac arteries were calcified but without            |   [facility]     |
| significant stenosis.  2. Right SFA with high-grade stenosis           | RADIOLOGY    |
| proximally with good endograft results after angioplasty and stenting. |              |
|   3. 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
| artery to right foot.  4. Right posterior tibial artery was            |              |
| chronically occluded.     Electronically signed by Kirt Mclaughlin MD on    |              |
| 2019 3:50 PM                                                      |              |
+------------------------------------------------------------------------+--------------+
 
 
 
+------------------------------------------------------------------------+--------------+
| Narrative                                                              | Performed At |
+------------------------------------------------------------------------+--------------+
|   LOWER EXTREMITY ARTERIOGRAM, UNILATERAL     PREOPERATIVE             |   [facility]     |
| DIAGNOSIS:    Critical limb ischemia of right lower extremity with     | RADIOLOGY    |
| poorly healing wound of right great toe     POSTOPERATIVE              |              |
| DIAGNOSIS:    Same     PROCEDURE:  1. Moderate conscious sedation  2.  |              |
| Ultrasound guided access of the left common femoral artery  3.         |              |
| Aortogram  4. Selective right common femoral artery catheterization    |              |
| with right leg runoff  5. Right SFA stenting using 6 x 80 mm           |              |
| self-expanding stent  6 Drug eluting balloon angioplasty of right SFA  |              |
| using 4 x 100 mm Lutonix balloon     SURGEON: Kirt Mclaughlin MD              |              |
| ASSISTANT: None     ANESTHESIA: Moderate sedation and local anesthesia |              |
|      Informed consent was obtained from the patient. Continuous        |              |
| cardiac monitoring was performed throughout the procedure.  Conscious  |              |
| sedation was provided by the nursing staff during the procedure under  |              |
| my supervision.  Sedation time: 34 minutes  Medications: 2 mg Versed   |              |
| IV, 50 mcg Fentanyl IV     ESTIMATED BLOOD LOSS: Minimal               |              |
| CONTRAST:    53 mL of Isovue 250     INDICATIONS:    See preoperative  |              |
| history and physical     FINDINGS:    Aorta and iliac arteries         |              |
| calcified but without significant stenosis. Right SFA with high-grade  |              |
| stenosis proximally. After angioplasty and stenting, good angiographic |              |
|  results. Good two-vessel runoff to right foot via right anterior      |              |
| tibial artery and peroneal artery. Right posterior tibial artery was   |              |
| chronically occluded.     DESCRIPTION OF PROCEDURE:    The patient was |              |
|  properly identified and brought to the Cath Lab. The patient was      |              |
| placed supine on the catheter table and patient was given moderate     |              |
| sedation by nursing staff under my supervision. Patient's bilateral    |              |
| groins were then prepped and draped in the usual sterile fashion.      |              |
| Local anesthetic was given to the left groin and the left common       |              |
| femoral artery was accessed under ultrasound guidance using a          |              |
| micropuncture needle. A micropuncture sheath was inserted over a wire  |              |
| and up sized to a 4 French sheath. A Omni flush catheter was then      |              |
| inserted into the distal aorta and aortogram was then performed with   |              |
 
| the findings as described above. The Omni Flush catheter was then used |              |
|  to guide a Glidewire into the right common femoral artery and then    |              |
| the catheter was then advanced over the wire. A right lower extremity  |              |
| runoff was then performed with the findings as described above.        |              |
| Next, an Amplatzer wire was then inserted over the catheter and the 4  |              |
| French sheath was removed. A 6 French destination sheath was then      |              |
| inserted over the wire with the tip of the sheath being placed into    |              |
| the right common femoral artery. A vertebral catheter was inserted     |              |
| over the wire and the wire was then removed. A Glidewire was then      |              |
| placed into the vertebral catheter and used to cross through the       |              |
| stenotic right SFA.    Next, a 260 fix core wire was then inserted     |              |
| over the catheter.    Right SFA was stented using 6 x 80 mm            |              |
| self-expanding stent. Drug eluting balloon angioplasty of the right    |              |
| SFA was then performed using 4 x 100 mm Lutonix balloon.     A final   |              |
| arteriogram was then performed through the sheath. The destination     |              |
| sheath was then removed and a Mynx closure device was then used on the |              |
|  left common femoral artery and hemostasis was ensured. The patient    |              |
| was then taken to the observation unit for further monitoring.         |              |
+------------------------------------------------------------------------+--------------+
 
 
 
+-------------------------------------------------------------------------------------------
--------------------------------------------------------------------------------------------
-----------------------------------+
| Procedure Note                                                                            
                                                                                            
                                   |
+-------------------------------------------------------------------------------------------
--------------------------------------------------------------------------------------------
-----------------------------------+
|   Denny, Rad Results In - 2019  8:40 AM PST  LOWER EXTREMITY ARTERIOGRAM,             
                                                                                            
                                   |
| UNILATERALPREOPERATIVE DIAGNOSIS:  Critical limb ischemia of right lower extremity with   
                                                                                            
                                   |
| poorly healing wound of right great toePOSTOPERATIVE DIAGNOSIS:  SamePROCEDURE:1.         
                                                                                            
                                   |
| Moderate conscious sedation2. Ultrasound guided access of the left common femoral         
                                                                                            
                                   |
| artery3. Aortogram4. Selective right common femoral artery catheterization with right     
                                                                                            
                                   |
| leg runoff5. Right SFA stenting using 6 x 80 mm self-expanding stent6 Drug eluting        
                                                                                            
                                   |
| balloon angioplasty of right SFA using 4 x 100 mm Lutonix balloonSURGEON: Kirt Mclaughlin,        
                                                                                            
                                   |
| MDASSISTANT: NoneANESTHESIA: Moderate sedation and local anesthesiaInformed consent was   
                                                                                            
                                   |
| obtained from the patient. Continuous cardiac monitoring was performed throughout the     
                                                                                            
                                   |
| procedure.Conscious sedation was provided by the nursing staff during the procedure       
                                                                                            
 
                                   |
| under my supervision.Sedation time: 34 minutesMedications: 2 mg Versed IV, 50 mcg         
                                                                                            
                                   |
| Fentanyl IVESTIMATED BLOOD LOSS: MinimalCONTRAST:  53 mL of Isovue 250INDICATIONS:  See   
                                                                                            
                                   |
| preoperative history and physicalFINDINGS:  Aorta and iliac arteries calcified but        
                                                                                            
                                   |
| without significant stenosis. Right SFA with high-grade stenosis proximally. After        
                                                                                            
                                   |
| angioplasty and stenting, good angiographic results. Good two-vessel runoff to right      
                                                                                            
                                   |
| foot via right anterior tibial artery and peroneal artery. Right posterior tibial artery  
                                                                                            
                                   |
|  was chronically occluded.DESCRIPTION OF PROCEDURE:  The patient was properly identified  
                                                                                            
                                   |
|  and brought to the Cath Lab. The patient was placed supine on the catheter table and     
                                                                                            
                                   |
| patient was given moderate sedation by nursing staff under my supervision. Patient's      
                                                                                            
                                   |
| bilateral groins were then prepped and draped in the usual sterile fashion. Local         
                                                                                            
                                   |
| anesthetic was given to the left groin and the left common femoral artery was accessed    
                                                                                            
                                   |
| under ultrasound guidance using a micropuncture needle. A micropuncture sheath was        
                                                                                            
                                   |
| inserted over a wire and up sized to a 4 French sheath. A Omni flush catheter was then    
                                                                                            
                                   |
| inserted into the distal aorta and aortogram was then performed with the findings as      
                                                                                            
                                   |
| described above. The Omni Flush catheter was then used to guide a Glidewire into the      
                                                                                            
                                   |
| right common femoral artery and then the catheter was then advanced over the wire. A      
                                                                                            
                                   |
| right lower extremity runoff was then performed with the findings as described            
                                                                                            
                                   |
| above.Next, an Amplatzer wire was then inserted over the catheter and the 4 French        
                                                                                            
                                   |
| sheath was removed. A 6 French destination sheath was then inserted over the wire with    
                                                                                            
                                   |
| the tip of the sheath being placed into the right common femoral artery. A vertebral      
                                                                                            
 
                                   |
| catheter was inserted over the wire and the wire was then removed. A Glidewire was then   
                                                                                            
                                   |
| placed into the vertebral catheter and used to cross through the stenotic right SFA.      
                                                                                            
                                   |
| Next, a 260 fix core wire was then inserted over the catheter.  Right SFA was stented     
                                                                                            
                                   |
| using 6 x 80 mm self-expanding stent. Drug eluting balloon angioplasty of the right SFA   
                                                                                            
                                   |
| was then performed using 4 x 100 mm Lutonix balloon.A final arteriogram was then          
                                                                                            
                                   |
| performed through the sheath. The destination sheath was then removed and a Mynx closure  
                                                                                            
                                   |
|  device was then used on the left common femoral artery and hemostasis was ensured. The   
                                                                                            
                                   |
| patient was then taken to the observation unit for further monitoring.IMPRESSION:1.       
                                                                                            
                                   |
| Aorta and iliac arteries were calcified but without significant stenosis.2. Right SFA     
                                                                                            
                                   |
| with high-grade stenosis proximally with good endograft results after angioplasty and     
                                                                                            
                                   |
| stenting.3. Two-vessel runoff via right anterior tibial artery and peroneal artery to     
                                                                                            
                                   |
| right foot.4. 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       
                                                                                            
                                   |
| signed by Kirt Mclaughlin MD on 2019 3:50 PM                                              
                                                                                            
                                   |
|                                                                                           
                                                                                            
                                   |
|A final arteriogram was then performed through the sheath. The destination sheath was then 
removed and a Mynx closure device was then used on the left common femoral artery and hemost
asis was ensured. The patient was  |
|then taken to the observation unit for further monitoring.                                 
                                                                                            
                                   |
|                                                                                           
                                                                                            
                                   |
|IMPRESSION:                                                                                
                                                                                            
                                  |
|                                                                                           
                                                                                            
                                   |
|1. Aorta and iliac arteries were calcified but without significant stenosis.               
                                                                                            
 
                                   |
|2. Right SFA with high-grade stenosis proximally with good endograft results after angiopla
sty and stenting.                                                                           
                                   |
|3. Two-vessel runoff via right anterior tibial artery and peroneal artery to right foot.   
                                                                                            
                                   |
|4. 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                                 
                                                                                            
                                   |
|                                                                                           
                                                                                            
                                   |
|Electronically signed by Kirt Mclaughlin MD on 2019 3:50 PM                                
                                                                                            
                                   |
+-------------------------------------------------------------------------------------------
--------------------------------------------------------------------------------------------
-----------------------------------+
 
 
 
+--------------------+------------------+--------------------+--------------+
| Performing         | Address          | City/State/Zipcode | Phone Number |
| Organization       |                  |                    |              |
+--------------------+------------------+--------------------+--------------+
|   [facility] |   [address] | [place] [address] |              |
+--------------------+------------------+--------------------+--------------+
 IR aortagram abdominal (2019  6:45 PM)
 
+------------------------------------------------------------------------+--------------+
| Impressions                                                            | Performed At |
+------------------------------------------------------------------------+--------------+
|      1. Aorta and iliac arteries were calcified but without            |   [facility]     |
| significant stenosis.  2. Right SFA with high-grade stenosis           | RADIOLOGY    |
| proximally with good endograft results after angioplasty and stenting. |              |
|   3. 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
| artery to right foot.  4. Right posterior tibial artery was            |              |
| chronically occluded.     Electronically signed by Kirt Mclaughlin MD on    |              |
| 2019 3:50 PM                                                      |              |
+------------------------------------------------------------------------+--------------+
 
 
 
+------------------------------------------------------------------------+--------------+
| Narrative                                                              | Performed At |
+------------------------------------------------------------------------+--------------+
|   LOWER EXTREMITY ARTERIOGRAM, UNILATERAL     PREOPERATIVE             |   [facility]     |
| DIAGNOSIS:    Critical limb ischemia of right lower extremity with     | RADIOLOGY    |
| poorly healing wound of right great toe     POSTOPERATIVE              |              |
| DIAGNOSIS:    Same     PROCEDURE:  1. Moderate conscious sedation  2.  |              |
| Ultrasound guided access of the left common femoral artery  3.         |              |
| Aortogram  4. Selective right common femoral artery catheterization    |              |
| with right leg runoff  5. Right SFA stenting using 6 x 80 mm           |              |
| self-expanding stent  6 Drug eluting balloon angioplasty of right SFA  |              |
| using 4 x 100 mm Lutonix balloon     SURGEON: Kirt Mclaughlin MD              |              |
| ASSISTANT: None     ANESTHESIA: Moderate sedation and local anesthesia |              |
|      Informed consent was obtained from the patient. Continuous        |              |
| cardiac monitoring was performed throughout the procedure.  Conscious  |              |
| sedation was provided by the nursing staff during the procedure under  |              |
 
| my supervision.  Sedation time: 34 minutes  Medications: 2 mg Versed   |              |
| IV, 50 mcg Fentanyl IV     ESTIMATED BLOOD LOSS: Minimal               |              |
| CONTRAST:    53 mL of Isovue 250     INDICATIONS:    See preoperative  |              |
| history and physical     FINDINGS:    Aorta and iliac arteries         |              |
| calcified but without significant stenosis. Right SFA with high-grade  |              |
| stenosis proximally. After angioplasty and stenting, good angiographic |              |
|  results. Good two-vessel runoff to right foot via right anterior      |              |
| tibial artery and peroneal artery. Right posterior tibial artery was   |              |
| chronically occluded.     DESCRIPTION OF PROCEDURE:    The patient was |              |
|  properly identified and brought to the Cath Lab. The patient was      |              |
| placed supine on the catheter table and patient was given moderate     |              |
| sedation by nursing staff under my supervision. Patient's bilateral    |              |
| groins were then prepped and draped in the usual sterile fashion.      |              |
| Local anesthetic was given to the left groin and the left common       |              |
| femoral artery was accessed under ultrasound guidance using a          |              |
| micropuncture needle. A micropuncture sheath was inserted over a wire  |              |
| and up sized to a 4 French sheath. A Omni flush catheter was then      |              |
| inserted into the distal aorta and aortogram was then performed with   |              |
| the findings as described above. The Omni Flush catheter was then used |              |
|  to guide a Glidewire into the right common femoral artery and then    |              |
| the catheter was then advanced over the wire. A right lower extremity  |              |
| runoff was then performed with the findings as described above.        |              |
| Next, an Amplatzer wire was then inserted over the catheter and the 4  |              |
| French sheath was removed. A 6 French destination sheath was then      |              |
| inserted over the wire with the tip of the sheath being placed into    |              |
| the right common femoral artery. A vertebral catheter was inserted     |              |
| over the wire and the wire was then removed. A Glidewire was then      |              |
| placed into the vertebral catheter and used to cross through the       |              |
| stenotic right SFA.    Next, a 260 fix core wire was then inserted     |              |
| over the catheter.    Right SFA was stented using 6 x 80 mm            |              |
| self-expanding stent. Drug eluting balloon angioplasty of the right    |              |
| SFA was then performed using 4 x 100 mm Lutonix balloon.     A final   |              |
| arteriogram was then performed through the sheath. The destination     |              |
| sheath was then removed and a Mynx closure device was then used on the |              |
|  left common femoral artery and hemostasis was ensured. The patient    |              |
| was then taken to the observation unit for further monitoring.         |              |
+------------------------------------------------------------------------+--------------+
 
 
 
+-------------------------------------------------------------------------------------------
--------------------------------------------------------------------------------------------
-----------------------------------+
| Procedure Note                                                                            
                                                                                            
                                   |
+-------------------------------------------------------------------------------------------
--------------------------------------------------------------------------------------------
-----------------------------------+
|   Denny, Rad Results In - 2019  8:40 AM PST  LOWER EXTREMITY ARTERIOGRAM,             
                                                                                            
                                   |
| UNILATERALPREOPERATIVE DIAGNOSIS:  Critical limb ischemia of right lower extremity with   
                                                                                            
                                   |
| poorly healing wound of right great toePOSTOPERATIVE DIAGNOSIS:  SamePROCEDURE:1.         
                                                                                            
                                   |
| Moderate conscious sedation2. Ultrasound guided access of the left common femoral         
                                                                                            
 
                                   |
| artery3. Aortogram4. Selective right common femoral artery catheterization with right     
                                                                                            
                                   |
| leg runoff5. Right SFA stenting using 6 x 80 mm self-expanding stent6 Drug eluting        
                                                                                            
                                   |
| balloon angioplasty of right SFA using 4 x 100 mm Lutonix balloonSURGEON: Kirt Mclaughlin,        
                                                                                            
                                   |
| MDASSISTANT: NoneANESTHESIA: Moderate sedation and local anesthesiaInformed consent was   
                                                                                            
                                   |
| obtained from the patient. Continuous cardiac monitoring was performed throughout the     
                                                                                            
                                   |
| procedure.Conscious sedation was provided by the nursing staff during the procedure       
                                                                                            
                                   |
| under my supervision.Sedation time: 34 minutesMedications: 2 mg Versed IV, 50 mcg         
                                                                                            
                                   |
| Fentanyl IVESTIMATED BLOOD LOSS: MinimalCONTRAST:  53 mL of Isovue 250INDICATIONS:  See   
                                                                                            
                                   |
| preoperative history and physicalFINDINGS:  Aorta and iliac arteries calcified but        
                                                                                            
                                   |
| without significant stenosis. Right SFA with high-grade stenosis proximally. After        
                                                                                            
                                   |
| angioplasty and stenting, good angiographic results. Good two-vessel runoff to right      
                                                                                            
                                   |
| foot via right anterior tibial artery and peroneal artery. Right posterior tibial artery  
                                                                                            
                                   |
|  was chronically occluded.DESCRIPTION OF PROCEDURE:  The patient was properly identified  
                                                                                            
                                   |
|  and brought to the Cath Lab. The patient was placed supine on the catheter table and     
                                                                                            
                                   |
| patient was given moderate sedation by nursing staff under my supervision. Patient's      
                                                                                            
                                   |
| bilateral groins were then prepped and draped in the usual sterile fashion. Local         
                                                                                            
                                   |
| anesthetic was given to the left groin and the left common femoral artery was accessed    
                                                                                            
                                   |
| under ultrasound guidance using a micropuncture needle. A micropuncture sheath was        
                                                                                            
                                   |
| inserted over a wire and up sized to a 4 French sheath. A Omni flush catheter was then    
                                                                                            
                                   |
| inserted into the distal aorta and aortogram was then performed with the findings as      
                                                                                            
 
                                   |
| described above. The Omni Flush catheter was then used to guide a Glidewire into the      
                                                                                            
                                   |
| right common femoral artery and then the catheter was then advanced over the wire. A      
                                                                                            
                                   |
| right lower extremity runoff was then performed with the findings as described            
                                                                                            
                                   |
| above.Next, an Amplatzer wire was then inserted over the catheter and the 4 French        
                                                                                            
                                   |
| sheath was removed. A 6 French destination sheath was then inserted over the wire with    
                                                                                            
                                   |
| the tip of the sheath being placed into the right common femoral artery. A vertebral      
                                                                                            
                                   |
| catheter was inserted over the wire and the wire was then removed. A Glidewire was then   
                                                                                            
                                   |
| placed into the vertebral catheter and used to cross through the stenotic right SFA.      
                                                                                            
                                   |
| Next, a 260 fix core wire was then inserted over the catheter.  Right SFA was stented     
                                                                                            
                                   |
| using 6 x 80 mm self-expanding stent. Drug eluting balloon angioplasty of the right SFA   
                                                                                            
                                   |
| was then performed using 4 x 100 mm Lutonix balloon.A final arteriogram was then          
                                                                                            
                                   |
| performed through the sheath. The destination sheath was then removed and a Mynx closure  
                                                                                            
                                   |
|  device was then used on the left common femoral artery and hemostasis was ensured. The   
                                                                                            
                                   |
| patient was then taken to the observation unit for further monitoring.IMPRESSION:1.       
                                                                                            
                                   |
| Aorta and iliac arteries were calcified but without significant stenosis.2. Right SFA     
                                                                                            
                                   |
| with high-grade stenosis proximally with good endograft results after angioplasty and     
                                                                                            
                                   |
| stenting.3. Two-vessel runoff via right anterior tibial artery and peroneal artery to     
                                                                                            
                                   |
| right foot.4. 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       
                                                                                            
                                   |
| signed by Kirt Mclaughlin MD on 2019 3:50 PM                                              
                                                                                            
                                   |
|                                                                                           
                                                                                            
 
                                   |
|A final arteriogram was then performed through the sheath. The destination sheath was then 
removed and a Mynx closure device was then used on the left common femoral artery and hemost
asis was ensured. The patient was  |
|then taken to the observation unit for further monitoring.                                 
                                                                                            
                                   |
|                                                                                           
                                                                                            
                                   |
|IMPRESSION:                                                                                
                                                                                            
                                  |
|                                                                                           
                                                                                            
                                   |
|1. Aorta and iliac arteries were calcified but without significant stenosis.               
                                                                                            
                                   |
|2. Right SFA with high-grade stenosis proximally with good endograft results after angiopla
sty and stenting.                                                                           
                                   |
|3. Two-vessel runoff via right anterior tibial artery and peroneal artery to right foot.   
                                                                                            
                                   |
|4. 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                                 
                                                                                            
                                   |
|                                                                                           
                                                                                            
                                   |
|Electronically signed by Kirt Mclaughlin MD on 2019 3:50 PM                                
                                                                                            
                                   |
+-------------------------------------------------------------------------------------------
--------------------------------------------------------------------------------------------
-----------------------------------+
 
 
 
+--------------------+------------------+--------------------+--------------+
| Performing         | Address          | City/State/Zipcode | Phone Number |
| Organization       |                  |                    |              |
+--------------------+------------------+--------------------+--------------+
|   [facility] |   [address] | [place] [address] |              |
+--------------------+------------------+--------------------+--------------+
 IR angiogram extremity right (2019  6:45 PM)
 
+------------------------------------------------------------------------+--------------+
| Impressions                                                            | Performed At |
+------------------------------------------------------------------------+--------------+
|      1. Aorta and iliac arteries were calcified but without            |   [facility]     |
| significant stenosis.  2. Right SFA with high-grade stenosis           | RADIOLOGY    |
| proximally with good endograft results after angioplasty and stenting. |              |
|   3. 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
| artery to right foot.  4. Right posterior tibial artery was            |              |
| chronically occluded.     Electronically signed by Kirt Mclaughlin MD on    |              |
| 2019 3:50 PM                                                      |              |
+------------------------------------------------------------------------+--------------+
 
 
 
 
+------------------------------------------------------------------------+--------------+
| Narrative                                                              | Performed At |
+------------------------------------------------------------------------+--------------+
|   LOWER EXTREMITY ARTERIOGRAM, UNILATERAL     PREOPERATIVE             |   [facility]     |
| DIAGNOSIS:    Critical limb ischemia of right lower extremity with     | RADIOLOGY    |
| poorly healing wound of right great toe     POSTOPERATIVE              |              |
| DIAGNOSIS:    Same     PROCEDURE:  1. Moderate conscious sedation  2.  |              |
| Ultrasound guided access of the left common femoral artery  3.         |              |
| Aortogram  4. Selective right common femoral artery catheterization    |              |
| with right leg runoff  5. Right SFA stenting using 6 x 80 mm           |              |
| self-expanding stent  6 Drug eluting balloon angioplasty of right SFA  |              |
| using 4 x 100 mm Lutonix balloon     SURGEON: Kirt Mclaughlin MD              |              |
| ASSISTANT: None     ANESTHESIA: Moderate sedation and local anesthesia |              |
|      Informed consent was obtained from the patient. Continuous        |              |
| cardiac monitoring was performed throughout the procedure.  Conscious  |              |
| sedation was provided by the nursing staff during the procedure under  |              |
| my supervision.  Sedation time: 34 minutes  Medications: 2 mg Versed   |              |
| IV, 50 mcg Fentanyl IV     ESTIMATED BLOOD LOSS: Minimal               |              |
| CONTRAST:    53 mL of Isovue 250     INDICATIONS:    See preoperative  |              |
| history and physical     FINDINGS:    Aorta and iliac arteries         |              |
| calcified but without significant stenosis. Right SFA with high-grade  |              |
| stenosis proximally. After angioplasty and stenting, good angiographic |              |
|  results. Good two-vessel runoff to right foot via right anterior      |              |
| tibial artery and peroneal artery. Right posterior tibial artery was   |              |
| chronically occluded.     DESCRIPTION OF PROCEDURE:    The patient was |              |
|  properly identified and brought to the Cath Lab. The patient was      |              |
| placed supine on the catheter table and patient was given moderate     |              |
| sedation by nursing staff under my supervision. Patient's bilateral    |              |
| groins were then prepped and draped in the usual sterile fashion.      |              |
| Local anesthetic was given to the left groin and the left common       |              |
| femoral artery was accessed under ultrasound guidance using a          |              |
| micropuncture needle. A micropuncture sheath was inserted over a wire  |              |
| and up sized to a 4 French sheath. A Omni flush catheter was then      |              |
| inserted into the distal aorta and aortogram was then performed with   |              |
| the findings as described above. The Omni Flush catheter was then used |              |
|  to guide a Glidewire into the right common femoral artery and then    |              |
| the catheter was then advanced over the wire. A right lower extremity  |              |
| runoff was then performed with the findings as described above.        |              |
| Next, an Amplatzer wire was then inserted over the catheter and the 4  |              |
| French sheath was removed. A 6 French destination sheath was then      |              |
| inserted over the wire with the tip of the sheath being placed into    |              |
| the right common femoral artery. A vertebral catheter was inserted     |              |
| over the wire and the wire was then removed. A Glidewire was then      |              |
| placed into the vertebral catheter and used to cross through the       |              |
| stenotic right SFA.    Next, a 260 fix core wire was then inserted     |              |
| over the catheter.    Right SFA was stented using 6 x 80 mm            |              |
| self-expanding stent. Drug eluting balloon angioplasty of the right    |              |
| SFA was then performed using 4 x 100 mm Lutonix balloon.     A final   |              |
| arteriogram was then performed through the sheath. The destination     |              |
| sheath was then removed and a Mynx closure device was then used on the |              |
|  left common femoral artery and hemostasis was ensured. The patient    |              |
| was then taken to the observation unit for further monitoring.         |              |
+------------------------------------------------------------------------+--------------+
 
 
 
+-------------------------------------------------------------------------------------------
--------------------------------------------------------------------------------------------
 
-----------------------------------+
| Procedure Note                                                                            
                                                                                            
                                   |
+-------------------------------------------------------------------------------------------
--------------------------------------------------------------------------------------------
-----------------------------------+
|   Lauro Baldwin Results In - 2019  8:40 AM PST  LOWER EXTREMITY ARTERIOGRAM,             
                                                                                            
                                   |
| UNILATERALPREOPERATIVE DIAGNOSIS:  Critical limb ischemia of right lower extremity with   
                                                                                            
                                   |
| poorly healing wound of right great toePOSTOPERATIVE DIAGNOSIS:  SamePROCEDURE:1.         
                                                                                            
                                   |
| Moderate conscious sedation2. Ultrasound guided access of the left common femoral         
                                                                                            
                                   |
| artery3. Aortogram4. Selective right common femoral artery catheterization with right     
                                                                                            
                                   |
| leg runoff5. Right SFA stenting using 6 x 80 mm self-expanding stent6 Drug eluting        
                                                                                            
                                   |
| balloon angioplasty of right SFA using 4 x 100 mm Lutonix balloonSURGEON: Kirt Mclaughlin,        
                                                                                            
                                   |
| MDASSISTANT: NoneANESTHESIA: Moderate sedation and local anesthesiaInformed consent was   
                                                                                            
                                   |
| obtained from the patient. Continuous cardiac monitoring was performed throughout the     
                                                                                            
                                   |
| procedure.Conscious sedation was provided by the nursing staff during the procedure       
                                                                                            
                                   |
| under my supervision.Sedation time: 34 minutesMedications: 2 mg Versed IV, 50 mcg         
                                                                                            
                                   |
| Fentanyl IVESTIMATED BLOOD LOSS: MinimalCONTRAST:  53 mL of Isovue 250INDICATIONS:  See   
                                                                                            
                                   |
| preoperative history and physicalFINDINGS:  Aorta and iliac arteries calcified but        
                                                                                            
                                   |
| without significant stenosis. Right SFA with high-grade stenosis proximally. After        
                                                                                            
                                   |
| angioplasty and stenting, good angiographic results. Good two-vessel runoff to right      
                                                                                            
                                   |
| foot via right anterior tibial artery and peroneal artery. Right posterior tibial artery  
                                                                                            
                                   |
|  was chronically occluded.DESCRIPTION OF PROCEDURE:  The patient was properly identified  
                                                                                            
                                   |
|  and brought to the Cath Lab. The patient was placed supine on the catheter table and     
                                                                                            
 
                                   |
| patient was given moderate sedation by nursing staff under my supervision. Patient's      
                                                                                            
                                   |
| bilateral groins were then prepped and draped in the usual sterile fashion. Local         
                                                                                            
                                   |
| anesthetic was given to the left groin and the left common femoral artery was accessed    
                                                                                            
                                   |
| under ultrasound guidance using a micropuncture needle. A micropuncture sheath was        
                                                                                            
                                   |
| inserted over a wire and up sized to a 4 French sheath. A Omni flush catheter was then    
                                                                                            
                                   |
| inserted into the distal aorta and aortogram was then performed with the findings as      
                                                                                            
                                   |
| described above. The Omni Flush catheter was then used to guide a Glidewire into the      
                                                                                            
                                   |
| right common femoral artery and then the catheter was then advanced over the wire. A      
                                                                                            
                                   |
| right lower extremity runoff was then performed with the findings as described            
                                                                                            
                                   |
| above.Next, an Amplatzer wire was then inserted over the catheter and the 4 French        
                                                                                            
                                   |
| sheath was removed. A 6 French destination sheath was then inserted over the wire with    
                                                                                            
                                   |
| the tip of the sheath being placed into the right common femoral artery. A vertebral      
                                                                                            
                                   |
| catheter was inserted over the wire and the wire was then removed. A Glidewire was then   
                                                                                            
                                   |
| placed into the vertebral catheter and used to cross through the stenotic right SFA.      
                                                                                            
                                   |
| Next, a 260 fix core wire was then inserted over the catheter.  Right SFA was stented     
                                                                                            
                                   |
| using 6 x 80 mm self-expanding stent. Drug eluting balloon angioplasty of the right SFA   
                                                                                            
                                   |
| was then performed using 4 x 100 mm Lutonix balloon.A final arteriogram was then          
                                                                                            
                                   |
| performed through the sheath. The destination sheath was then removed and a Mynx closure  
                                                                                            
                                   |
|  device was then used on the left common femoral artery and hemostasis was ensured. The   
                                                                                            
                                   |
| patient was then taken to the observation unit for further monitoring.IMPRESSION:1.       
                                                                                            
 
                                   |
| Aorta and iliac arteries were calcified but without significant stenosis.2. Right SFA     
                                                                                            
                                   |
| with high-grade stenosis proximally with good endograft results after angioplasty and     
                                                                                            
                                   |
| stenting.3. Two-vessel runoff via right anterior tibial artery and peroneal artery to     
                                                                                            
                                   |
| right foot.4. 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       
                                                                                            
                                   |
| signed by Kirt Mclaughlin MD on 2019 3:50 PM                                              
                                                                                            
                                   |
|                                                                                           
                                                                                            
                                   |
|A final arteriogram was then performed through the sheath. The destination sheath was then 
removed and a Mynx closure device was then used on the left common femoral artery and hemost
asis was ensured. The patient was  |
|then taken to the observation unit for further monitoring.                                 
                                                                                            
                                   |
|                                                                                           
                                                                                            
                                   |
|IMPRESSION:                                                                                
                                                                                            
                                  |
|                                                                                           
                                                                                            
                                   |
|1. Aorta and iliac arteries were calcified but without significant stenosis.               
                                                                                            
                                   |
|2. Right SFA with high-grade stenosis proximally with good endograft results after angiopla
sty and stenting.                                                                           
                                   |
|3. Two-vessel runoff via right anterior tibial artery and peroneal artery to right foot.   
                                                                                            
                                   |
|4. 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                                 
                                                                                            
                                   |
|                                                                                           
                                                                                            
                                   |
|Electronically signed by Kirt Mclaughlin MD on 2019 3:50 PM                                
                                                                                            
                                   |
+-------------------------------------------------------------------------------------------
--------------------------------------------------------------------------------------------
-----------------------------------+
 
 
 
+--------------------+------------------+--------------------+--------------+
| Performing         | Address          | City/State/Zipcode | Phone Number |
 
| Organization       |                  |                    |              |
+--------------------+------------------+--------------------+--------------+
|   [facility] |   [address] | [place] [address] |              |
+--------------------+------------------+--------------------+--------------+
 Blood Culture Set 2 (2019  6:05 PM)
 
+----------------------+------------------------+-----------+--------------+
| Component            | Value                  | Ref Range | Performed At |
+----------------------+------------------------+-----------+--------------+
| Specimen Description | BLOOD, PERIPHERAL DRAW |           | [facility]   |
|                      |                        |           | LABORATORY   |
+----------------------+------------------------+-----------+--------------+
| CULTURE              | NO GROWTH 6 DAYS       |           | [facility]   |
|                      |                        |           | LABORATORY   |
+----------------------+------------------------+-----------+--------------+
 
 
 
+-----------------+
| Specimen        |
+-----------------+
| Blood - Blood,  |
| peripheral draw |
+-----------------+
 
 
 
+---------------+-------------------------+---------------------+----------------+
| Performing    | Address                 | City/State/Zipcode  | Phone Number   |
| Organization  |                         |                     |                |
+---------------+-------------------------+---------------------+----------------+
|   [facility]  |   [address]  | [place] [address] |   [phone] |
| LABORATORY    | [address]                   |                     |                |
+---------------+-------------------------+---------------------+----------------+
 IR guidance vascular access US (2019  5:40 PM)
 
+------------------------------------------------------------------------+--------------+
| Narrative                                                              | Performed At |
+------------------------------------------------------------------------+--------------+
|   This Point of Care (POC) ultrasound image has been reviewed and      |   [facility]     |
| interpreted by the physician identified as the performing physician in | RADIOLOGY    |
|  the   associated interpretation and report.                           |              |
+------------------------------------------------------------------------+--------------+
 
 
 
+--------------------+------------------+--------------------+--------------+
| Performing         | Address          | City/State/Zipcode | Phone Number |
| Organization       |                  |                    |              |
+--------------------+------------------+--------------------+--------------+
|   [facility] |   [address] | [place] [address] |              |
+--------------------+------------------+--------------------+--------------+
 C-reactive protein (2019  3:43 PM)
 
+-----------+--------------------------+------------+-----------------+
| Component | Value                    | Ref Range  | Performed At    |
+-----------+--------------------------+------------+-----------------+
| CRP       | 0.6 (H)Comment: Testing  | <0.5 mg/dL | [facility] |
|           | performed at [facility] [place]     |            |                 |
|           | [address];[place]   |            |                 |
 
|           | [address]                    |            |                 |
+-----------+--------------------------+------------+-----------------+
 
 
 
+-------------------+
| Specimen          |
+-------------------+
| Blood - Arm, Left |
+-------------------+
 
 
 
+-------------------+------------------+--------------------+--------------+
| Performing        | Address          | City/State/Zipcode | Phone Number |
| Organization      |                  |                    |              |
+-------------------+------------------+--------------------+--------------+
|   [facility] |   [address] | [place] [address] |              |
+-------------------+------------------+--------------------+--------------+
 Sedimentation rate, automated (2019  3:43 PM)
 
+-----------+-------------------------+--------------+-----------------+
| Component | Value                   | Ref Range    | Performed At    |
+-----------+-------------------------+--------------+-----------------+
| ESR       | 13Comment: Testing      | 0 - 30 mm/Hr | [facility] |
|           | performed at [facility] [place]    |              |                 |
|           | [address];[place]  |              |                 |
|           | [address]                   |              |                 |
+-----------+-------------------------+--------------+-----------------+
 
 
 
+-------------------+
| Specimen          |
+-------------------+
| Blood - Arm, Left |
+-------------------+
 
 
 
+-------------------+------------------+--------------------+--------------+
| Performing        | Address          | City/State/Zipcode | Phone Number |
| Organization      |                  |                    |              |
+-------------------+------------------+--------------------+--------------+
|   [facility] |   [address] | [place] [address] |              |
+-------------------+------------------+--------------------+--------------+
 CPK (2019  3:43 PM)
 
+-----------+-------------------------+--------------+-----------------+
| Component | Value                   | Ref Range    | Performed At    |
+-----------+-------------------------+--------------+-----------------+
| CPK       | 28 (L)Comment: Testing  | 30 - 240 U/L | [facility] |
|           | performed at [facility] [place]    |              |                 |
|           | [address];[place], [place]  |              |                 |
|           | [address]                   |              |                 |
+-----------+-------------------------+--------------+-----------------+
 
 
 
+-------------------+
 
| Specimen          |
+-------------------+
| Blood - Arm, Left |
+-------------------+
 
 
 
+-------------------+------------------+--------------------+--------------+
| Performing        | Address          | City/State/Zipcode | Phone Number |
| Organization      |                  |                    |              |
+-------------------+------------------+--------------------+--------------+
|   [facility] |   [address] | [place] [address] |              |
+-------------------+------------------+--------------------+--------------+
 Comprehensive metabolic panel (2019  3:43 PM)
 
+----------------+--------------------------+-------------------+-----------------+
| Component      | Value                    | Ref Range         | Performed At    |
+----------------+--------------------------+-------------------+-----------------+
| SODIUM         | 139                      | 135 - 145 mmol/L  | [facility] |
+----------------+--------------------------+-------------------+-----------------+
| POTASSIUM      | 4.0                      | 3.5 - 4.9 mmol/L  | [facility] |
+----------------+--------------------------+-------------------+-----------------+
| CHLORIDE       | 100                      | 99 - 109 mmol/L   | [facility] |
+----------------+--------------------------+-------------------+-----------------+
| CO2            | 29                       | 23 - 32 mmol/L    | [facility] |
+----------------+--------------------------+-------------------+-----------------+
| ANION GAP AGAP | 14                       | 5 - 20 mmol/L     | [facility] |
+----------------+--------------------------+-------------------+-----------------+
| GLUCOSE        | 202 (H)                  | 65 - 99 mg/dL     | [facility] |
+----------------+--------------------------+-------------------+-----------------+
| BUN            | 23                       | 8 - 25 mg/dL      | [facility] |
+----------------+--------------------------+-------------------+-----------------+
| CREATININE     | 6.1 (H)                  | 0.50 - 1.00 mg/dL | [facility] |
+----------------+--------------------------+-------------------+-----------------+
| BUN/CREAT      | 4                        |                   | [facility] |
+----------------+--------------------------+-------------------+-----------------+
| CALCIUM        | 9.0                      | 8.5 - 10.5 mg/dL  | [facility] |
+----------------+--------------------------+-------------------+-----------------+
| TOTAL PROTEIN  | 6.2 (L)                  | 6.3 - 8.2 g/dL    | [facility] |
+----------------+--------------------------+-------------------+-----------------+
| Albumin        | 2.7 (L)                  | 3.3 - 4.8 g/dL    | [facility] |
+----------------+--------------------------+-------------------+-----------------+
| GLOBULIN       | 3.5                      | 1.3 - 4.9 g/dL    | [facility] |
+----------------+--------------------------+-------------------+-----------------+
| A/G            | 0.8 (L)                  | 1.0 - 2.4         | [facility] |
+----------------+--------------------------+-------------------+-----------------+
| TBIL           | 0.4                      | 0.1 - 1.5 mg/dL   | [facility] |
+----------------+--------------------------+-------------------+-----------------+
| ALK PHOS       | 110                      | 35 - 115 U/L      | [facility] |
+----------------+--------------------------+-------------------+-----------------+
| AST            | 24                       | 10 - 45 U/L       | [facility] |
+----------------+--------------------------+-------------------+-----------------+
| ALT            | 21                       | 10 - 65 U/L       | [facility] |
+----------------+--------------------------+-------------------+-----------------+
| EGFR           | 7 (L)Comment: GFR <60:   | >60 mL/min/1.73m2 | [facility] |
|                | CHRONIC KIDNEY DISEASE,  |                   |                 |
|                | IF FOUND OVER A 3 MONTH  |                   |                 |
|                | PERIOD.GFR <15: KIDNEY   |                   |                 |
|                | FAILURE.FOR       |                   |                 |
|                | [employer], MULTIPLY THE  |                   |                 |
 
|                | CALCULATED GFR BY        |                   |                 |
|                | 1.210.This eGFR is       |                   |                 |
|                | calculated using the     |                   |                 |
|                | MDRD [facility] traceable      |                   |                 |
|                | equation.Testing         |                   |                 |
|                | performed at [facility] [place]     |                   |                 |
|                | [facility];[place], [place]   |                   |                 |
|                | [address]                    |                   |                 |
+----------------+--------------------------+-------------------+-----------------+
 
 
 
+-------------------+
| Specimen          |
+-------------------+
| Blood - Arm, Left |
+-------------------+
 
 
 
+-------------------+------------------+--------------------+--------------+
| Performing        | Address          | City/State/Zipcode | Phone Number |
| Organization      |                  |                    |              |
+-------------------+------------------+--------------------+--------------+
|   [facility] |   [address] | [place] [address] |              |
+-------------------+------------------+--------------------+--------------+
 Blood Culture Set 1 (2019  3:43 PM)
 
+----------------------+------------------+-----------+-----------------+
| Component            | Value            | Ref Range | Performed At    |
+----------------------+------------------+-----------+-----------------+
| Specimen Description | BLOOD            |           | [facility]      |
|                      |                  |           | LABORATORY      |
+----------------------+------------------+-----------+-----------------+
| SPECIAL REQUESTS     | RAC              |           | [facility] |
+----------------------+------------------+-----------+-----------------+
| CULTURE              | NO GROWTH 6 DAYS |           | [facility]      |
|                      |                  |           | LABORATORY      |
+----------------------+------------------+-----------+-----------------+
 
 
 
+----------------+
| Specimen       |
+----------------+
| Blood - Blood  |
+----------------+
 
 
 
+-------------------+-------------------------+---------------------+----------------+
| Performing        | Address                 | City/State/Zipcode  | Phone Number   |
| Organization      |                         |                     |                |
+-------------------+-------------------------+---------------------+----------------+
|   [facility]      |   [address]  | [place] [address] |   [phone] |
| LABORATORY        | Blvd.                   |                     |                |
+-------------------+-------------------------+---------------------+----------------+
|   [facility] |   [address]        | [place] [address]  |                |
+-------------------+-------------------------+---------------------+----------------+
 CBC w/manual diff (2019  3:43 PM)
 
 
+----------------------+-------------------------+-------------------+-----------------+
| Component            | Value                   | Ref Range         | Performed At    |
+----------------------+-------------------------+-------------------+-----------------+
| WBC                  | 5.53Comment:            | 3.80 - 11.00 K/uL | [facility] |
+----------------------+-------------------------+-------------------+-----------------+
| RBC                  | 2.76 (L)                | 3.70 - 5.10 M/uL  | [facility] |
+----------------------+-------------------------+-------------------+-----------------+
| HGB                  | 9.4 (L)                 | 11.3 - 15.5 g/dL  | [facility] |
+----------------------+-------------------------+-------------------+-----------------+
| HCT                  | 28.4 (L)                | 34.0 - 46.0 %     | [facility] |
+----------------------+-------------------------+-------------------+-----------------+
| MCV                  | 102.9 (H)               | 80.0 - 100.0 fl   | [facility] |
+----------------------+-------------------------+-------------------+-----------------+
| MCH                  | 34.1 (H)                | 27.0 - 34.0 pg    | [facility] |
+----------------------+-------------------------+-------------------+-----------------+
| MCHC                 | 33.2                    | 32.0 - 35.5 g/dL  | [facility] |
+----------------------+-------------------------+-------------------+-----------------+
| RDW SD               | 61.3 (H)                | 37 - 53 fl        | [facility] |
+----------------------+-------------------------+-------------------+-----------------+
| PLT                  | 147 (L)Comment:         | 150 - 400 K/uL    | [facility] |
+----------------------+-------------------------+-------------------+-----------------+
| MPV                  | 9.3Comment:             | fl                | [facility] |
+----------------------+-------------------------+-------------------+-----------------+
| DIFF TYPE            | MANUAL                  |                   | [facility] |
+----------------------+-------------------------+-------------------+-----------------+
| Neutrophils Manual   | 64                      | %                 | [facility] |
+----------------------+-------------------------+-------------------+-----------------+
| Lymphocytes Manual   | 28                      | %                 | [facility] |
+----------------------+-------------------------+-------------------+-----------------+
| Monocytes Manual     | 8                       | %                 | [facility] |
+----------------------+-------------------------+-------------------+-----------------+
| Neutrophils Absolute | 3.54                    | 1.90 - 7.40 K/uL  | [facility] |
+----------------------+-------------------------+-------------------+-----------------+
| Lymphocytes Absolute | 1.55                    | 1.00 - 3.90 K/uL  | [facility] |
+----------------------+-------------------------+-------------------+-----------------+
| Monocytes Absolute   | 0.44                    | 0.00 - 0.80 K/uL  | [facility] |
+----------------------+-------------------------+-------------------+-----------------+
| MORPHOLOGY           | 1+Comment: ANISONORMAL  |                   | [facility] |
|                      | PLT MORPHTesting        |                   |                 |
|                      | performed at [facility] [place]    |                   |                 |
|                      | [facility];[place], [place]  |                   |                 |
|                      | [address]                   |                   |                 |
|                      |                         |                   |                 |
+----------------------+-------------------------+-------------------+-----------------+
 
 
 
+-------------------+
| Specimen          |
+-------------------+
| Blood - Arm, Left |
+-------------------+
 
 
 
+-------------------+------------------+--------------------+--------------+
| Performing        | Address          | City/State/Zipcode | Phone Number |
| Organization      |                  |                    |              |
+-------------------+------------------+--------------------+--------------+
 
|   [facility] |   [address] | [place] [address] |              |
+-------------------+------------------+--------------------+--------------+
 US lower extremty  arterial right (2019  2:23 PM)
 
+------------------------------------------------------------------------+--------------+
| Impressions                                                            | Performed At |
+------------------------------------------------------------------------+--------------+
|   1. Right leg shows biphasic inflow with a greater than 50% stenosis  |   [facility]     |
| at  the origin of the superficial femoral artery                       | RADIOLOGY    |
| (137-309).    Biphasic  outflow.  2. Two vessel runoff to the right    |              |
| foot.  Signed by: Eugenio Hoffman  Sign Date/Time: 2019 3:11 PM  |              |
+------------------------------------------------------------------------+--------------+
 
 
 
+------------------------------------------------------------------------+--------------+
| Narrative                                                              | Performed At |
+------------------------------------------------------------------------+--------------+
|   LOWER EXTREMITY ARTERIAL EXAM WITH IMAGING  CLINICAL INFORMATION:    |   [facility]     |
| The patient is a 68-year-old female presenting to the vascular lab     | RADIOLOGY    |
| with  complaints of right foot nonhealing wound.    We have been asked |              |
|  to  evaluate for peripheral arterial disease.  COMPARISON:  None      |              |
| PROCEDURE:  Imaging of the right lower extremity arteries was          |              |
| performed using both  color Doppler and spectral Doppler techniques    |              |
| with a 9 megahertz linear  array transducer.  FINDINGS:  RIGHT  CFA    |              |
| prox 137 biphasic  DFA prox 71 biphasic  SFA prox 309 biphasic  SFA    |              |
| mid 91 biphasic  SFA distal 127 biphasic  POP mid 96 biphasic  GIL     |              |
| prox 69 biphasic  GIL distal 145 biphasic  PTA prox 50 biphasic  PTA   |              |
| distal 58 biphasic  WINIFRED prox 74 biphasic  WINIFRED distal 0    absent     |              |
+------------------------------------------------------------------------+--------------+
 
 
 
+------------------------------------------------------------------------------------------+
| Procedure Note                                                                           |
+------------------------------------------------------------------------------------------+
|   Lauro Baldwin Results In - 2019  3:14 PM PST  LOWER EXTREMITY ARTERIAL EXAM WITH      |
| IMAGINGCLINICAL INFORMATION:The patient is a 68-year-old female presenting to the        |
| vascular lab withcomplaints of right foot nonhealing wound.  We have been asked          |
| toevaluate for peripheral arterial disease.COMPARISON:NonePROCEDURE:Imaging of the right |
|  lower extremity arteries was performed using bothcolor Doppler and spectral Doppler     |
| techniques with a 9 megahertz lineararray transducer.FINDINGS:RIGHTCFA prox 137          |
| biphasicDFA prox 71 biphasicSFA prox 309 biphasicSFA mid 91 biphasicSFA distal 127       |
| biphasicPOP mid 96 biphasicATA prox 69 biphasicATA distal 145 biphasicPTA prox 50        |
| biphasicPTA distal 58 biphasicPERA prox 74 biphasicPERA distal 0  absentIMPRESSION:1.    |
| Right leg shows biphasic inflow with a greater than 50% stenosis atthe origin of the     |
| superficial femoral artery (137-309).  Biphasicoutflow.2. Two vessel runoff to the right |
|  foot.Signed by: Jamilah Hoffman Date/Time: 2019 3:11 PM                       |
|RIGHT                                                                                    |
|CFA prox 137 biphasic                                                                     |
|DFA prox 71 biphasic                                                                      |
|SFA prox 309 biphasic                                                                     |
|SFA mid 91 biphasic                                                                       |
|SFA distal 127 biphasic                                                                   |
|POP mid 96 biphasic                                                                       |
|GIL prox 69 biphasic                                                                      |
|GIL distal 145 biphasic                                                                   |
|PTA prox 50 biphasic                                                                      |
|PTA distal 58 biphasic                                                                    |
|WINIFRED prox 74 biphasic                                                                     |
 
|WINIFRED distal 0  absent                                                                     |
|IMPRESSION:                                                                              |
|1. Right leg shows biphasic inflow with a greater than 50% stenosis at                    |
|the origin of the superficial femoral artery (137-309).  Biphasic                         |
|outflow.                                                                                 |
|2. Two vessel runoff to the right foot.                                                   |
|Signed by: Eugenio Hoffman                                                                |
|Sign Date/Time: 2019 3:11 PM                                                        |
+------------------------------------------------------------------------------------------+
 
 
 
+--------------------+------------------+--------------------+--------------+
| Performing         | Address          | City/State/Zipcode | Phone Number |
| Organization       |                  |                    |              |
+--------------------+------------------+--------------------+--------------+
|   [facility] |   [address] | [place] [address] |              |
+--------------------+------------------+--------------------+--------------+
 ED INFORMATION EXCHANGE (2019  1:03 PM)
 
+------------------------------------------------------------------------+--------------+
| Narrative                                                              | Performed At |
+------------------------------------------------------------------------+--------------+
|   [id number] [id number]     Upcoming Changes to the [facility]     |   ED         |
| Notification  On 2019 the layout of this notification will | INFORMATION  |
|  be updated. For an overview of upcoming changes, please log into      | EXCHANGE     |
| [email or web address]. For questions,       |              |
| please email [email or web address] or call [phone].     |              |
|  This patient has registered at the [facility]     |              |
| Emergency Department   For more information visit:                     |              |
| [email or web address] |              |
| [id number]   Security Events  No recent Security Events currently on file  |              |
|     ED Care Guidelines from fake company [employer]  Last Updated: 18 |              |
|  10:16 AM         Other Information:  Currently being seen by Myranda |              |
|  in [place] for mental health concerns.[phone]  These are       |              |
| guidelines and the provider should exercise clinical judgment when     |              |
| providing care.  Additional guidelines are also available online from  |              |
| the following facilities: [facility]   Recent Emergency      |              |
| Department Visit Summary  Date Facility City State Type Major Type     |              |
| Diagnoses or Chief Complaint   2019 [facility]       |              |
| [facility]. [place] Emergency    Emergency       2019 [facility]    |              |
| [facility]. [place] Emergency    Emergency          Chest Pain              |              |
| Nausea        Shortness of Breath        Chest pain, unspecified       |              |
|      Elevated blood-pressure reading, without diagnosis of             |              |
| hypertension        Presence of aortocoronary bypass graft             |              |
| Other specified postprocedural states      2019 [facility]  |              |
| Health [place]. [place] Emergency    Emergency          CHEST PAIN            |              |
| Abnormal levels of other serum enzymes        Personal history of      |              |
| other diseases of the circulatory system        Chest pain,            |              |
| unspecified      2019 [facility] [place]. [place]            |              |
| Emergency    Emergency          FISTULA BLEEDING        Hemorrhage due |              |
|  to vascular prosthetic devices, implants and grafts, initial          |              |
| encounter      Dec 22, 2018 [facility] [place]. [place]             |              |
| Emergency    Emergency          CHEST PAIN        Type 2 diabetes      |              |
| mellitus with hyperglycemia        Chest pain, unspecified      Nov    |              |
| 2018 [place] [place] Emergency    Emergency    Chief |              |
|  Complaint: SOB      2018 [facility] [place]       |              |
| Emergency    Emergency          FALL        Unspecified fall, initial  |              |
| encounter        Dependence on renal dialysis        End stage renal   |              |
| disease      2018 [facility] [place]               |              |
 
| Emergency    Emergency          FALL        Essential (primary)        |              |
| hypertension        Type 2 diabetes mellitus with hyperglycemia        |              |
|  Type 2 diabetes mellitus with unspecified complications               |              |
| Unspecified fall, initial encounter        Headache      2018  |              |
| [place]. [place] Emergency    Emergency          -1.      |              |
| Dorsalgia, unspecified        -1. Painful micturition, unspecified     |              |
|      0. Frequency of micturition        1. Urinary tract infection,    |              |
| site not specified        6. Type 2 diabetes mellitus with             |              |
| hyperglycemia        7. Type 2 diabetes mellitus with diabetic chronic |              |
|  kidney disease        8. End stage renal disease        9. Dependence |              |
|  on renal dialysis        10. Shortness of breath        11. Long term |              |
|  (current) use of anticoagulants            E.D. Visit Count (12 mo.)  |              |
|  Facility Visits Low Acuity   [place] [id number] 0   [facility]        |              |
| [facility] 2 0   [facility] 3 0   Total   |              |
| 22 0   Note: Visits indicate total known visits. Medicaid Low Acuity   |              |
| Dx are the number of primary diagnoses on the Medicaid's Low Acuity dx |              |
|  list.         Recent Inpatient Visit Summary  Date Facility City      |              |
| State Type Major Type Diagnoses or Chief Complaint   [facility] 2018      [facility]              |
[facility] [place] [facility]    Inpatient                   |              |
|     Peripheral arterial disease, osteomyelitis left foot        Other  |              |
| acute osteomyelitis, left ankle and foot        Long term (current)    |              |
| use of antibiotics        End stage renal disease        Anemia in     |              |
| chronic kidney disease      Dec 5, 2018 [facility] |              |
|  General Medicine    Inpatient          Peripheral vascular disease,   |              |
| unspecified        End stage renal disease        Dependence on renal  |              |
| dialysis        Long term (current) use of insulin        Other        |              |
| chronic osteomyelitis, left ankle and foot        Type 2 diabetes      |              |
| mellitus with diabetic chronic kidney disease        Essential         |              |
| (primary) hypertension      [facility] [place]   |              |
| [place] Inpatient    Inpatient          Upper GIB        Other chronic      |              |
| osteomyelitis, unspecified ankle and foot        End stage renal       |              |
| disease        Other specified personal risk factors, not elsewhere    |              |
| classified        Chronic systolic (congestive) heart failure          |              |
| Anemia in chronic kidney disease        Other disorders of             |              |
| plasma-protein metabolism, not elsewhere classified        Moderate    |              |
| protein-calorie malnutrition        Other disorders of phosphorus      |              |
| metabolism      [facility] [place] [place] [facility]     |              |
| [facility]    Inpatient          1. Type 2 diabetes mellitus with other  |              |
| specified complication        2. Urinary tract infection, site not     |              |
| specified        3. Unspecified protein-calorie malnutrition        4. |              |
|  Other chronic osteomyelitis, unspecified site        5. Hypertensive  |              |
| heart and chronic kidney disease with heart failure and with stage 5   |              |
| chronic kidney disease, or end stage renal disease        6. Chronic   |              |
| diastolic (congestive) heart failure        7. Other osteomyelitis,    |              |
| ankle and foot        8. Type 2 diabetes mellitus with foot ulcer      |              |
|      9. Atherosclerotic heart disease of native coronary artery        |              |
| without angina pectoris        10. Chronic obstructive pulmonary       |              |
| disease, unspecified      2018 [facility] [place]    |              |
| Medical Surgical    Inpatient          1. Benign paroxysmal vertigo,   |              |
| unspecified ear        2. End stage renal disease        3.            |              |
| Hypertensive chronic kidney disease with stage 5 chronic kidney        |              |
| disease or end stage renal disease        4. Urinary tract infection,  |              |
| site not specified        5. Hypertensive heart and chronic kidney     |              |
| disease with heart failure and with stage 5 chronic kidney disease, or |              |
|  end stage renal disease        6. Kidney transplant status        7.  |              |
| Unspecified systolic (congestive) heart failure        8. Syncope and  |              |
| collapse        9. Type 2 diabetes mellitus with diabetic chronic      |              |
| kidney disease        10. Atherosclerotic heart disease of native      |              |
| coronary artery without angina pectoris            Prescription Drug   |              |
| Report (12 Mo.)  PDMP query found no report.     Care Providers        |              |
 
| Provider PRC Type Phone Fax Service Dates   HEADINGS, SALEEM HENDERSONN.P.     |              |
| Nurse Practitioner: Family     Current      Marco Antonio Martinez Case        |              |
| Manager/Care Coordinator [phone]    2019 - Current       |              |
| [facility] Primary Care [phone]    2019 -            |              |
| Current      [facility] Primary [facility]    [phone]       |              |
| [phone] Current      [facility] Primary     |              |
| Care     Current      [facility] GONZALEZ Primary Care     Current            |              |
| [facility] Mental Health Provider [phone]     |              |
| Current      [facility] Case or Care Manager     Current      [facility]   |              |
| MIRTA Sr Case or Care Manager [phone]     |              |
| [phone] Current      Jairo Gutierrez MD Other     Current           |              |
| Known Aliases  No known aliases.   Criteria Met         Care           |              |
| Guidelines      10 in 12      3 in 60     The above information is     |              |
| provided for the sole purpose of patient treatment. Use of this        |              |
| information beyond the terms of Data Sharing Memorandum of             |              |
| Understanding and License Agreement is prohibited. In certain cases    |              |
| not all visits may be represented.   Consult the aforementioned        |              |
| facilities for additional information.   2019 [facility]       |              |
| [place], [place] -                              |              |
| [email or web address]                                         |              |
+------------------------------------------------------------------------+--------------+
 
 
 
+-------------------------------------------------------------------------------------------
--------------------------------------------------------------------------------------------
--------------------+
| Procedure Note                                                                            
                                                                                            
                    |
+-------------------------------------------------------------------------------------------
--------------------------------------------------------------------------------------------
--------------------+
|   Interface, Lab - 2019  1:04 PM PST  Formatting of this note may be different      
                                                                                            
                    |
| from the original.[id number] [id number] Changes to the [facility]             
                                                                                            
                    |
| NotificationOn 2019 the layout of this notification will be updated. For an   
                                                                                            
                    |
| overview of upcoming changes, please log into                                             
                                                                                            
                    |
| [email or web address] questions, please email             
                                                                                            
                    |
| [email or web address] or call [phone].This patient has registered at the   
                                                                                            
                    |
| [facility] Emergency Department For more information visit:           
                                                                                            
                    |
| [email or web address] Security     
                                                                                            
                    |
| EventsNo recent Security Events currently on fileED Care Guidelines from [employer] -       
                                                                                            
                    |
 
| Antoinette Updated: 18 10:16 AM  Other Information:Currently being seen by         
                                                                                            
                    |
| [facility] in [place] for mental health concerns.[phone] are guidelines and     
                                                                                            
                    |
| the provider should exercise clinical judgment when providing care.Additional guidelines  
                                                                                            
                    |
|  are also available online from the following facilities: [employer] Recent     
                                                                                            
                    |
| Emergency Department Visit SummaryDate Facility [facility] State Type Major Type Diagnoses or   
                                                                                            
                    |
| Chief Complaint 2019 [facility] [place]. [place] Emergency  Emergency   [place]    
                                                                                            
                    |
| 2019 [facility] [place]. [place] Emergency  Emergency    Chest Pain    Nausea      
                                                                                            
                    |
| Shortness of Breath    Chest pain, unspecified    Elevated blood-pressure reading,        
                                                                                            
                    |
| without diagnosis of hypertension    Presence of aortocoronary bypass graft    Other      
                                                                                            
                    |
| specified postprocedural states  2019 [employer] [place]. [place] Emergency    
                                                                                            
                    |
| Emergency    CHEST PAIN    Abnormal levels of other serum enzymes    Personal history of  
                                                                                            
                    |
|  other diseases of the circulatory system    Chest pain, unspecified  2019 [facility]    
                                                                                            
                    |
| [facility] [place]. [place] Emergency  Emergency    FISTULA BLEEDING    Hemorrhage due to   
                                                                                            
                    |
| vascular prosthetic devices, implants and grafts, initial encounter  Dec 22, 2018 [facility]    
                                                                                            
                    |
| [facility] [place] Emergency  Emergency    CHEST PAIN    Type 2 diabetes mellitus  
                                                                                            
                    |
|  with hyperglycemia    [facility]hest pain, unspecified  [facility]   
                                                                                            
                    |
| [place] Emergency  Emergency  Chief Complaint: SOB  2018 [facility].   
                                                                                            
                    |
| [place] Emergency  Emergency    FALL    Unspecified fall, initial encounter    Dependence on   
                                                                                            
                    |
| renal dialysis    End stage renal disease  2018 [facility]. [place]    
                                                                                            
                    |
| Emergency  Emergency    FALL    Essential (primary) hypertension    Type 2 diabetes       
                                                                                            
                    |
 
| mellitus with hyperglycemia    Type 2 diabetes mellitus with unspecified complications    
                                                                                            
                    |
|   Unspecified fall, initial encounter    Headache  2018 [facility] [place]       
                                                                                            
                    |
| [place]. [place] Emergency  Emergency    -1. Dorsalgia, unspecified    -1. Painful micturition,  
                                                                                            
                    |
|  unspecified    0. Frequency of micturition    1. Urinary tract infection, site not       
                                                                                            
                    |
| specified    6. Type 2 diabetes mellitus with hyperglycemia    7. Type 2 diabetes         
                                                                                            
                    |
| mellitus with diabetic chronic kidney disease    8. End stage renal disease    9.         
                                                                                            
                    |
| Dependence on renal dialysis    10. Shortness of breath    11. Long term (current) use    
                                                                                            
                    |
| of anticoagulants  E.D. Visit Count (12 mo.)Facility Visits Low [facility]      
                                                                                            
                    |
| Health 17 0 [facility] 2 0 [facility] 3 0 Total 22 0   
                                                                                            
                    |
| Note: Visits indicate total known visits. Medicaid Low Acuity Dx are the number of        
                                                                                            
                    |
| primary diagnoses on the Medicaid's Low Acuity dx list.  Recent Inpatient Visit           
                                                                                            
                    |
| SummaryDate Facility [facility] State Type Major Type Diagnoses or Chief Complaint Dec 27,      
                                                                                            
                    |
|  [facility] [place]. [place] Recovery  Inpatient    Peripheral arterial disease,   
                                                                                            
                    |
| osteomyelitis left foot    Other acute osteomyelitis, left ankle and foot    Long term    
                                                                                            
                    |
| (current) use of antibiotics    End stage renal disease    Anemia in chronic kidney       
                                                                                            
                    |
| disease  Dec 5, 2018 [facility] [place]. [place] General Medicine  Inpatient           
                                                                                            
                    |
| Peripheral vascular disease, unspecified    End stage renal disease    Dependence on      
                                                                                            
                    |
| renal dialysis    Long term (current) use of insulin    Other chronic osteomyelitis,      
                                                                                            
                    |
| left ankle and foot    Type 2 diabetes mellitus with diabetic chronic kidney disease      
                                                                                            
                    |
| Essential (primary) hypertension  2018 [facility] [place]. [place] Inpatient   
                                                                                            
                    |
 
|  Inpatient    Upper GIB    Other chronic osteomyelitis, unspecified ankle and foot        
                                                                                            
                    |
| End stage renal disease    Other specified personal risk factors, not elsewhere           
                                                                                            
                    |
| classified    Chronic systolic (congestive) heart failure    Anemia in chronic kidney     
                                                                                            
                    |
| disease    Other disorders of plasma-protein metabolism, not elsewhere classified         
                                                                                            
                    |
| Moderate protein-calorie malnutrition    Other disorders of phosphorus metabolism  Nov    
                                                                                            
                    |
| 2018 [facility] [place]. [place] Medical Surgical  Inpatient    1. Type 2 diabetes  
                                                                                            
                    |
|  mellitus with other specified complication    2. Urinary tract infection, site not       
                                                                                            
                    |
| specified    3. Unspecified protein-calorie malnutrition    4. Other chronic              
                                                                                            
                    |
| osteomyelitis, unspecified site    5. Hypertensive heart and chronic kidney disease with  
                                                                                            
                    |
|  heart failure and with stage 5 chronic kidney disease, or end stage renal disease    6.  
                                                                                            
                    |
|  Chronic diastolic (congestive) heart failure    7. Other osteomyelitis, ankle and foot   
                                                                                            
                    |
|    8. Type 2 diabetes mellitus with foot ulcer    9. Atherosclerotic heart disease of     
                                                                                            
                    |
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 Chronic obstructive pulmonary       
                                                                                            
                    |
| disease, unspecified  2018 [facility] [place]. [place] Medical Surgical          
                                                                                            
                    |
| Inpatient    1. Benign paroxysmal vertigo, unspecified ear    2. End stage renal disease  
                                                                                            
                    |
|     3. Hypertensive chronic kidney disease with stage 5 chronic kidney disease or end     
                                                                                            
                    |
| stage renal disease    4. Urinary tract infection, site not specified    5. Hypertensive  
                                                                                            
                    |
|  heart and chronic kidney disease with heart failure and with stage 5 chronic kidney      
                                                                                            
                    |
| disease, or end stage renal disease    6. Kidney transplant status    7. Unspecified      
                                                                                            
                    |
| systolic (congestive) heart failure    8. Syncope and collapse    9. Type 2 diabetes      
                                                                                            
                    |
 
| mellitus with diabetic chronic kidney disease    10. Atherosclerotic heart disease of     
                                                                                            
                    |
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     
                                                                                            
                    |
| query found no report.Care [facility] Type Phone Fax Service Dates HEADINGS,   
                                                                                            
                    |
| ERNST HENDERSONP. Nurse Practitioner: Family   Current  Marco Antonio Martinez /Care       
                                                                                            
                    |
| Coordinator [phone]  2019 - Current  [facility] ([phone])      
                                                                                            
                    |
| [phone]  2019 - Current  [facility] Primary [facility]  ([phone])        
                                                                                            
                    |
| [phone] Current  [facility] Primary Care   Current  MANOLO      
                                                                                            
                    |
| [facility] Primary Care   Current  [employer] Mental Health Provider [phone]  
                                                                                            
                    |
|  [phone] Current  [employer] Case or Care Manager   Current  Willard Crook -             
                                                                                            
                    |
| MIRTA Ortiz Case or Care Manager [phone] Current  Brenda,        
                                                                                            
                    |
| Jairo HARRIS MD Other   Current  Known AliasesNo known aliases. Criteria Met  Care          
                                                                                            
                    |
| Guidelines  10 in 12  3 in 60The above information is provided for the sole purpose of    
                                                                                            
                    |
| patient treatment. Use of this information beyond the terms of Data Sharing Memorandum    
                                                                                            
                    |
| of Understanding and License Agreement is prohibited. In certain cases not all visits     
                                                                                            
                    |
| may be represented. Consult the aforementioned facilities for additional information.     
                                                                                            
                    |
|  [employer]. [place], [place] -                         
                                                                                            
                    |
| [email or web address]                                                            
                                                                                            
                    |
|  Peripheral arterial disease, osteomyelitis left foot                                     
                                                                                            
                    |
|   Other acute osteomyelitis, left ankle and foot                                          
                                                                                            
                    |
|   Long term (current) use of antibiotics                                                  
                                                                                            
                    |
 
|   End stage renal disease                                                                 
                                                                                            
                    |
|   Anemia in chronic kidney disease                                                        
                                                                                            
                    |
|                                                                                           
                                                                                            
                    |
|Dec 5, 2018 [facility] [place]. [place] General Medicine  Inpatient                     
                                                                                            
                    |
|  Peripheral vascular disease, unspecified                                                 
                                                                                            
                    |
|   End stage renal disease                                                                 
                                                                                            
                    |
|   Dependence on renal dialysis                                                            
                                                                                            
                    |
|   Long term (current) use of insulin                                                      
                                                                                            
                    |
|   Other chronic osteomyelitis, left ankle and foot                                        
                                                                                            
                    |
|   Type 2 diabetes mellitus with diabetic chronic kidney disease                           
                                                                                            
                    |
|   Essential (primary) hypertension                                                        
                                                                                            
                    |
|                                                                                           
                                                                                            
                    |
|[facility] [place] [place] [facility]  Inpatient                           
                                                                                            
                    |
|  Upper GIB                                                                                
                                                                                            
                    |
|   Other chronic osteomyelitis, unspecified ankle and foot                                 
                                                                                            
                    |
|   End stage renal disease                                                                 
                                                                                            
                    |
|   Other specified personal risk factors, not elsewhere classified                         
                                                                                            
                    |
|   Chronic systolic (congestive) heart failure                                             
                                                                                            
                    |
|   Anemia in chronic kidney disease                                                        
                                                                                            
                    |
|   Other disorders of plasma-protein metabolism, not elsewhere classified                  
                                                                                            
                    |
 
|   Moderate protein-calorie malnutrition                                                   
                                                                                            
                    |
|   Other disorders of phosphorus metabolism                                                
                                                                                            
                    |
|                                                                                           
                                                                                            
                    |
|2018 [place]. [place] Medical Surgical  Inpatient                     
                                                                                            
                    |
|  1. Type 2 diabetes mellitus with other specified complication                            
                                                                                            
                    |
|   2. Urinary tract infection, site not specified                                          
                                                                                            
                    |
|   3. Unspecified protein-calorie malnutrition                                             
                                                                                            
                    |
|   4. Other chronic osteomyelitis, unspecified site                                        
                                                                                            
                    |
|   5. 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
onic kidney disease, or end stage renal disease                                             
                    |
|   6. Chronic diastolic (congestive) heart failure                                         
                                                                                            
                    |
|   7. Other osteomyelitis, ankle and foot                                                  
                                                                                            
                    |
|   8. Type 2 diabetes mellitus with foot ulcer                                             
                                                                                            
                    |
|   9. Atherosclerotic heart disease of native coronary artery without angina pectoris      
                                                                                            
                    |
|   10. Chronic obstructive pulmonary disease, unspecified                                  
                                                                                            
                    |
|                                                                                           
                                                                                            
                    |
|2018 [facility] [place] [facility]  Inpatient                      
                                                                                            
                    |
|  1. Benign paroxysmal vertigo, unspecified ear                                            
                                                                                            
                    |
|   2. End stage renal disease                                                              
                                                                                            
                    |
|   3. Hypertensive chronic kidney disease with stage 5 chronic kidney disease or end stage 
renal disease                                                                               
                    |
|   4. Urinary tract infection, site not specified                                          
                                                                                            
                    |
 
|   5. 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
onic kidney disease, or end stage renal disease                                             
                    |
|   6. Kidney transplant status                                                             
                                                                                            
                    |
|   7. Unspecified systolic (congestive) heart failure                                      
                                                                                            
                    |
|   8. Syncope and collapse                                                                 
                                                                                            
                    |
|   9. Type 2 diabetes mellitus with diabetic chronic kidney disease                        
                                                                                            
                    |
|   10. Atherosclerotic heart disease of native coronary artery without angina pectoris     
                                                                                            
                    |
|                                                                                           
                                                                                            
                    |
|                                                                                           
                                                                                            
                    |
|                                                                                           
                                                                                            
                    |
|Prescription Drug Report (12 Mo.)                                                          
                                                                                            
                    |
|PDMP query found no report.                                                                
                                                                                            
                    |
|                                                                                           
                                                                                            
                    |
|Care Providers                                                                             
                                                                                            
                    |
|Provider PRC Type Phone Fax Service Dates                                                  
                                                                                            
                    |
|HEADINGS, SANTIAGO, F.N.P. Nurse Practitioner: Family   Current                                
                                                                                            
                    |
|[facility] Antonio Martinez /Care Coordinator [phone]  2019 - Current         
                                                                                            
                    |
|[facility] [phone]  2019 - Current                          
                                                                                            
                    |
|[facility]  [phone] Current                         
                                                                                            
                    |
|[facility] Primary Care   Current                                
                                                                                            
                    |
|[facility] Primary Care   Current                                                        
                                                                                            
                    |
 
|[employer] Mental [facility] Provider [phone] Current                 
                                                                                            
                    |
|[employer] Case or Care Manager   Current                                                   
                                                                                            
                    |
|MIRTA Goel Case or Care Manager [phone] Current
                                                                                            
                    |
|Jairo Gutierrez MD Other   Current                                                       
                                                                                            
                    |
|                                                                                           
                                                                                            
                    |
|Known Aliases                                                                              
                                                                                            
                    |
|No known aliases.                                                                          
                                                                                            
                    |
|Criteria Met                                                                               
                                                                                            
                    |
|                                                                                           
                                                                                            
                    |
|  Care Guidelines                                                                          
                                                                                            
                    |
|  10 in 12                                                                                 
                                                                                            
                    |
|  3 in 60                                                                                  
                                                                                            
                    |
|                                                                                           
                                                                                            
                    |
|The above information is provided for the sole purpose of patient treatment. Use of this in
formation beyond the terms of Data Sharing Memorandum of Understanding and License Agreement
 is prohibited. In  |
|certain cases not all visits may be represented. Consult the aforementioned facilities for 
additional information.                                                                     
                    |
|2019 [employer] - [place], [place] - [email or web address]                                                                                       
                    |
+-------------------------------------------------------------------------------------------
--------------------------------------------------------------------------------------------
--------------------+
 
 
 
+-------------------+---------+--------------------+--------------+
| Performing        | Address | City/State/Zipcode | Phone Number |
| Organization      |         |                    |              |
+-------------------+---------+--------------------+--------------+
|   ED INFORMATION  |         |                    |              |
| EXCHANGE          |         |                    |              |
 
+-------------------+---------+--------------------+--------------+
 in this encounter
 
 Visit Diagnoses
 
 
+-----------------------------------------------------------------------------------+
| Diagnosis                                                                         |
+-----------------------------------------------------------------------------------+
|   PVD (peripheral vascular disease) (HCC) - Primary                               |
+-----------------------------------------------------------------------------------+
|   Peripheral vascular disease, unspecified                                        |
+-----------------------------------------------------------------------------------+
|   ESRD (end stage renal disease) on dialysis (HCC)                                |
+-----------------------------------------------------------------------------------+
|   End stage renal disease                                                         |
+-----------------------------------------------------------------------------------+
|   Anemia in ESRD (end-stage renal disease) (HCC)                                  |
+-----------------------------------------------------------------------------------+
|   Anemia in chronic kidney disease                                                |
+-----------------------------------------------------------------------------------+
|   Hypoalbuminemia                                                                 |
+-----------------------------------------------------------------------------------+
|   Other disorders of plasma protein metabolism                                    |
+-----------------------------------------------------------------------------------+
|   Electrolyte imbalance risk                                                      |
+-----------------------------------------------------------------------------------+
|   Other specified conditions influencing health status                            |
+-----------------------------------------------------------------------------------+
|   ESRD (end stage renal disease) (HCC)                                            |
+-----------------------------------------------------------------------------------+
|   End stage renal disease                                                         |
+-----------------------------------------------------------------------------------+
|   CAD (coronary artery disease)                                                   |
+-----------------------------------------------------------------------------------+
|   Coronary atherosclerosis of unspecified type of vessel, native or graft         |
+-----------------------------------------------------------------------------------+
|   Paroxysmal atrial fibrillation (HCC)                                            |
+-----------------------------------------------------------------------------------+
|   Atrial fibrillation                                                             |
+-----------------------------------------------------------------------------------+
|   COPD (chronic obstructive pulmonary disease) (HCC)                              |
+-----------------------------------------------------------------------------------+
|   Chronic airway obstruction, not elsewhere classified                            |
+-----------------------------------------------------------------------------------+
|   Chronic systolic congestive heart failure (HCC)                                 |
+-----------------------------------------------------------------------------------+
|   Chronic systolic heart failure                                                  |
+-----------------------------------------------------------------------------------+
|   Hypertension, essential                                                         |
+-----------------------------------------------------------------------------------+
|   Unspecified essential hypertension                                              |
+-----------------------------------------------------------------------------------+
|   Type 2 diabetes mellitus with chronic kidney disease on chronic dialysis, with  |
| long-term current use of insulin (HCC)                                            |
+-----------------------------------------------------------------------------------+
 
 
 
 Admitting Diagnoses
 
 
 
+----------------------------------------------------+
| Diagnosis                                          |
+----------------------------------------------------+
|   PVD (peripheral vascular disease) (HCC)          |
+----------------------------------------------------+
|   Peripheral vascular disease, unspecified         |
+----------------------------------------------------+
|   ESRD (end stage renal disease) on dialysis (HCC) |
+----------------------------------------------------+
|   End stage renal disease                          |
+----------------------------------------------------+
 
 
 
 Administered Medications
 
 
+------------------+--------+---------+------+------+------+
| Medication Order | MAR    | Action  | Dose | Rate | Site |
|                  | Action | Date    |      |      |      |
+------------------+--------+---------+------+------+------+
 
 
 
+-----------------------------------+---+
|   acetaminophen (TYLENOL)         |   |
| suppository 650 mg  650 mg,       |   |
| Rectal, Every 6 Hours PRN, Mild   |   |
| Pain (1-3), Fever, Starting Wed   |   |
| 19 at 1910                   |   |
+-----------------------------------+---+
|                                   |   |
+-----------------------------------+---+
|   acetaminophen (TYLENOL) tablet  |   |
| 650 mg  650 mg, Oral, Every 6     |   |
| Hours PRN, Mild Pain (1-3),       |   |
| Fever, Starting Wed 19 at    |   |
| 1910                              |   |
+-----------------------------------+---+
|                                   |   |
+-----------------------------------+---+
 
 
 
+-----------------------------------+-------+----------+--------+---+---+
|   apixaban (ELIQUIS) tablet 2.5   | Given |  | 2.5 mg |   |   |
| mg  2.5 mg, Oral, 2 Times Daily,  |       | 9 14:01  |        |   |   |
| First dose on Thu 19 at 1200 |       | PST      |        |   |   |
+-----------------------------------+-------+----------+--------+---+---+
 
 
 
+-------+----------+--------+---+---+
| Given |  | 2.5 mg |   |   |
|       | 9 20:51  |        |   |   |
|       | PST      |        |   |   |
+-------+----------+--------+---+---+
| Given |  | 2.5 mg |   |   |
 
|       | 9 08:18  |        |   |   |
|       | PST      |        |   |   |
+-------+----------+--------+---+---+
 
 
 
+---+---+
|   |   |
+---+---+
 
 
 
+-----------------------------------+-------+----------+-------+---+---+
|   aspirin chewable tablet 81 mg   | Given |  | 81 mg |   |   |
| 81 mg, Oral, Daily With           |       | 9 10:01  |       |   |   |
| Breakfast, First dose on Fri      |       | PST      |       |   |   |
| 19 at 0930                   |       |          |       |   |   |
+-----------------------------------+-------+----------+-------+---+---+
 
 
 
+---+---+
|   |   |
+---+---+
 
 
 
+-----------------------------------+-------+----------+-------+---+---+
|   aspirin EC tablet 81 mg  81 mg, | Given |  | 81 mg |   |   |
|  Oral, Daily With Breakfast,      |       | 9 14:00  |       |   |   |
| First dose on Thu 19 at 0800 |       | PST      |       |   |   |
+-----------------------------------+-------+----------+-------+---+---+
 
 
 
+-------+----------+-------+---+---+
| Given |  | 81 mg |   |   |
|       | 9 08:18  |       |   |   |
|       | PST      |       |   |   |
+-------+----------+-------+---+---+
 
 
 
+---+---+
|   |   |
+---+---+
 
 
 
+-----------------------------------+-------+----------+-------+---+---+
|   atorvastatin (LIPITOR) tablet   | Given |  | 40 mg |   |   |
| 40 mg  40 mg, Oral, Nightly,      |       | 9 21:59  |       |   |   |
| First dose on 19 at 2200 |       | PST      |       |   |   |
+-----------------------------------+-------+----------+-------+---+---+
 
 
 
+-------+----------+-------+---+---+
| Given |  | 40 mg |   |   |
|       | 9 20:51  |       |   |   |
 
|       | PST      |       |   |   |
+-------+----------+-------+---+---+
 
 
 
+---+---+
|   |   |
+---+---+
 
 
 
+----------------------------------+-------+----------+--------+---+---+
|   calcium acetate (PHOSLO)       | Given |  | 667 mg |   |   |
| capsule 667 mg  667 mg, Oral, 3  |       | 9 17:07  |        |   |   |
| Times Daily With Meals, First    |       | PST      |        |   |   |
| dose on Wed 19 at 1930      |       |          |        |   |   |
+----------------------------------+-------+----------+--------+---+---+
 
 
 
+-------+----------+--------+---+---+
| Given |  | 667 mg |   |   |
|       | 9 08:17  |        |   |   |
|       | PST      |        |   |   |
+-------+----------+--------+---+---+
| Given |  | 667 mg |   |   |
|       | 9 12:33  |        |   |   |
|       | PST      |        |   |   |
+-------+----------+--------+---+---+
 
 
 
+---+---+
|   |   |
+---+---+
 
 
 
+-----------------------------------+-------+----------+---------+---+---+
|   carvedilol (COREG) tablet 12.5  | Given |  | 12.5 mg |   |   |
| mg  12.5 mg, Oral, 2 Times Daily  |       | 9 21:59  |         |   |   |
| With Meals, First dose on Wed     |       | PST      |         |   |   |
| 19 at 1930                   |       |          |         |   |   |
+-----------------------------------+-------+----------+---------+---+---+
 
 
 
+-------+----------+---------+---+---+
| Given |  | 12.5 mg |   |   |
|       | 9 17:07  |         |   |   |
|       | PST      |         |   |   |
+-------+----------+---------+---+---+
| Given |  | 12.5 mg |   |   |
|       | 9 08:17  |         |   |   |
|       | PST      |         |   |   |
+-------+----------+---------+---+---+
 
 
 
+-----------------------------------+---+
 
|                                   |   |
+-----------------------------------+---+
|   cefTAZidime (FORTAZ) 2 g in     |   |
| sodium chloride (IV) 0.9 % 50 mL  |   |
| IVPB  2 g, Intravenous,           |   |
| Administer over 30 Minutes, After |   |
|  Hemodialysis (see admin          |   |
| instructions), Starting Wed       |   |
| 19 at 1327                   |   |
+-----------------------------------+---+
|                                   |   |
+-----------------------------------+---+
 
 
 
+-----------------------------------+-------+----------+-----+-------+---+
|   cefTAZidime (FORTAZ) 2 g in     | Given |  | 2 g | 100   |   |
| sodium chloride (IV) 0.9 % 50 mL  |       | 9 20:55  |     | mL/hr |   |
| IVPB  2 g, Intravenous,           |       | PST      |     |       |   |
| Administer over 30 Minutes, Once, |       |          |     |       |   |
|  Wed 19 at 1930, For 1 dose  |       |          |     |       |   |
+-----------------------------------+-------+----------+-----+-------+---+
 
 
 
+---+---+
|   |   |
+---+---+
 
 
 
+-----------------------------------+-------+----------+--------+---+---+
|   cholecalciferol (VITAMIN D-3)   | Given |  | 5,000  |   |   |
| tablet 5,000 Units  5,000 Units,  |       | 9 21:58  | Units  |   |   |
| Oral, Daily, First dose on Wed    |       | PST      |        |   |   |
| 19 at 1930                   |       |          |        |   |   |
+-----------------------------------+-------+----------+--------+---+---+
 
 
 
+-------+----------+--------+---+---+
| Given |  | 5,000  |   |   |
|       | 9 14:00  | Units  |   |   |
|       | PST      |        |   |   |
+-------+----------+--------+---+---+
| Given |  | 5,000  |   |   |
|       | 9 08:18  | Units  |   |   |
|       | PST      |        |   |   |
+-------+----------+--------+---+---+
 
 
 
+---+---+
|   |   |
+---+---+
 
 
 
+-----------------------------------+-------+----------+-------+---+---+
|   clopidogrel (PLAVIX) tablet 75  | Given |  | 75 mg |   |   |
 
| mg  75 mg, Oral, Daily, First     |       | 9 14:01  |       |   |   |
| dose on u 19 at 0900       |       | PST      |       |   |   |
+-----------------------------------+-------+----------+-------+---+---+
 
 
 
+-------+----------+-------+---+---+
| Given |  | 75 mg |   |   |
|       | 9 08:17  |       |   |   |
|       | PST      |       |   |   |
+-------+----------+-------+---+---+
 
 
 
+---+---+
|   |   |
+---+---+
 
 
 
+----------------------------------+-------+----------+---------+---+---+
|   diphenhydrAMINE (BENADRYL)     | Given |  | 12.5 mg |   |   |
| injection 12.5 mg  12.5 mg,      |       | 9 09:56  |         |   |   |
| Intravenous, Every 6 Hours PRN,  |       | PST      |         |   |   |
| Allergic Reaction, Itching,      |       |          |         |   |   |
| Starting Fri 19 at 0842     |       |          |         |   |   |
+----------------------------------+-------+----------+---------+---+---+
 
 
 
+---+---+
|   |   |
+---+---+
 
 
 
+---------------------------------+-------+----------+---------+---+---+
|   epoetin byron (PROCRIT)        | Given |  | 10,000  |   |   |
| injection 10,000 Units  10,000  |       | 9 10:07  | Units   |   |   |
| Units, Intravenous, Once In     |       | PST      |         |   |   |
| Dialysis, Thu 19 at 0900,  |       |          |         |   |   |
| For 1 dose, DIALYSIS            |       |          |         |   |   |
+---------------------------------+-------+----------+---------+---+---+
 
 
 
+---+---+
|   |   |
+---+---+
 
 
 
+----------------------------------+-------+----------+-------+---+---+
|   famotidine (PEPCID) tablet 10  | Given |  | 10 mg |   |   |
| mg  10 mg, Oral, Daily, First    |       | 9 22:00  |       |   |   |
| dose on Wed 19 at 1930      |       | PST      |       |   |   |
+----------------------------------+-------+----------+-------+---+---+
 
 
 
 
+-------+----------+-------+---+---+
| Given |  | 10 mg |   |   |
|       | 9 14:01  |       |   |   |
|       | PST      |       |   |   |
+-------+----------+-------+---+---+
| Given |  | 10 mg |   |   |
|       | 9 08:17  |       |   |   |
|       | PST      |       |   |   |
+-------+----------+-------+---+---+
 
 
 
+-----------------------------------+---+
|                                   |   |
+-----------------------------------+---+
|   fentaNYL (SUBLIMAZE) injection  |   |
| 25 mcg  25 mcg, Intravenous,      |   |
| Every 1 Hour PRN, Moderate Pain   |   |
| (4-6), Starting 19 at    |   |
| 1910                              |   |
+-----------------------------------+---+
|                                   |   |
+-----------------------------------+---+
|   fentaNYL (SUBLIMAZE) injection  |   |
| 50 mcg  50 mcg, Intravenous,      |   |
| Every 1 Hour PRN, Severe Pain     |   |
| (7-10), Starting 19 at   |   |
| 1910                              |   |
+-----------------------------------+---+
|                                   |   |
+-----------------------------------+---+
 
 
 
+-----------------------------------+-------+----------+--------+---+---+
|   fentaNYL (SUBLIMAZE) injection  | Given |  | 50 mcg |   |   |
|  Once PRN, Starting 19   |       | 9 18:00  |        |   |   |
| at 1800, Intra-procedure          |       | PST      |        |   |   |
| (CATH/IR)                         |       |          |        |   |   |
+-----------------------------------+-------+----------+--------+---+---+
 
 
 
+---+---+
|   |   |
+---+---+
 
 
 
+-----------------------------------+-------+----------+--------+---+---+
|   heparin (porcine) 1000 UNIT/ML  | Given |  | 5,000  |   |   |
| injection  Once PRN, Starting Wed |       | 9 18:23  | Units  |   |   |
|  19 at 1823, Intra-procedure |       | PST      |        |   |   |
|  (CATH/IR)                        |       |          |        |   |   |
+-----------------------------------+-------+----------+--------+---+---+
 
 
 
+---+---+
|   |   |
 
+---+---+
 
 
 
+----------------------------------+-------+----------+----------+---+---+
|   HYDROcodone-acetaminophen      | Given |  | 1 tablet |   |   |
| (NORCO)  MG per tablet 1   |       | 9 11:09  |          |   |   |
| tablet  1 tablet, Oral, Every 4  |       | PST      |          |   |   |
| Hours PRN, Severe Pain (7-10),   |       |          |          |   |   |
| Starting Wed 19 at 1910     |       |          |          |   |   |
+----------------------------------+-------+----------+----------+---+---+
 
 
 
+-------+----------+----------+---+---+
| Given |  | 1 tablet |   |   |
|       | 9 20:51  |          |   |   |
|       | PST      |          |   |   |
+-------+----------+----------+---+---+
 
 
 
+---+---+
|   |   |
+---+---+
 
 
 
+----------------------------------+-------+----------+----------+---+---+
|   HYDROcodone-acetaminophen      | Given |  | 1 tablet |   |   |
| (NORCO) 5-325 MG per tablet 1    |       | 9 06:26  |          |   |   |
| tablet  1 tablet, Oral, Every 4  |       | PST      |          |   |   |
| Hours PRN, Moderate Pain (4-6),  |       |          |          |   |   |
| Starting Wed 19 at 1910     |       |          |          |   |   |
+----------------------------------+-------+----------+----------+---+---+
 
 
 
+-------+----------+----------+---+---+
| Given |  | 1 tablet |   |   |
|       | 9 09:56  |          |   |   |
|       | PST      |          |   |   |
+-------+----------+----------+---+---+
 
 
 
+---+---+
|   |   |
+---+---+
 
 
 
+-----------------------------------+-------+----------+--------+---+----------+
|   HYDROmorphone (DILAUDID)        | Given |  | 0.5 mg |   | Left Arm |
| injection 0.5 mg  0.5 mg,         |       | 9 15:55  |        |   |          |
| Intravenous, Once, Wed 19 at |       | PST      |        |   |          |
|  1508, For 1 dose                 |       |          |        |   |          |
+-----------------------------------+-------+----------+--------+---+----------+
 
 
 
 
+-----------------------------------+---+
|                                   |   |
+-----------------------------------+---+
|   HYDROmorphone (DILAUDID)        |   |
| injection 0.5 mg  0.5 mg,         |   |
| Intravenous, Every 3 Hours PRN,   |   |
| Moderate Pain (4-6), Starting Wed |   |
|  19 at 1910                  |   |
+-----------------------------------+---+
|                                   |   |
+-----------------------------------+---+
|   HYDROmorphone (DILAUDID)        |   |
| injection 1 mg  1 mg,             |   |
| Intravenous, Every 3 Hours PRN,   |   |
| Severe Pain (7-10), Starting Wed  |   |
| 19 at 1910                   |   |
+-----------------------------------+---+
|                                   |   |
+-----------------------------------+---+
 
 
 
+-----------------------------------+-------+----------+----------+---+---+
|   insulin glargine (LANTUS)       | Given |  | 15 Units |   |   |
| injection 15 Units  15 Units,     |       | 9 22:07  |          |   |   |
| Subcutaneous, Nightly, First dose |       | PST      |          |   |   |
|  on Wed 19 at 2200           |       |          |          |   |   |
+-----------------------------------+-------+----------+----------+---+---+
 
 
 
+-------+----------+----------+---+---+
| Given |  | 15 Units |   |   |
|       | 9 22:39  |          |   |   |
|       | PST      |          |   |   |
+-------+----------+----------+---+---+
 
 
 
+---+---+
|   |   |
+---+---+
 
 
 
+-----------------------------------+-------+----------+---------+---+---+
|   insulin lispro (human)          | Given |  | 1 Units |   |   |
| (HUMALOG) injection 0-3 Units     |       | 9 22:39  |         |   |   |
| 0-3 Units, Subcutaneous, Nightly, |       | PST      |         |   |   |
|  First dose on 19 at     |       |          |         |   |   |
| 2200                              |       |          |         |   |   |
+-----------------------------------+-------+----------+---------+---+---+
 
 
 
+---+---+
|   |   |
+---+---+
 
 
 
 
+-----------------------------------+-------+----------+---------+---+---+
|   insulin lispro (human)          | Given |  | 1 Units |   |   |
| (HUMALOG) injection 0-6 Units     |       | 9 17:12  |         |   |   |
| 0-6 Units, Subcutaneous, 3 Times  |       | PST      |         |   |   |
| Daily Before Meals, First dose on |       |          |         |   |   |
|  19 at 1930              |       |          |         |   |   |
+-----------------------------------+-------+----------+---------+---+---+
 
 
 
+-------+----------+---------+---+---+
| Given |  | 2 Units |   |   |
|       | 9 12:34  |         |   |   |
|       | PST      |         |   |   |
+-------+----------+---------+---+---+
 
 
 
+---+---+
|   |   |
+---+---+
 
 
 
+---------------------------------+-------+----------+--------+---+---+
|   iohexol (OMNIPAQUE 240) 240   | Given |  | 53 mLs |   |   |
| mg/mL injection 53 mL  53 mL,   |       | 9 18:34  |        |   |   |
| Intra-arterial, Img Once PRN,   |       | PST      |        |   |   |
| Other, Starting Wed 19 at  |       |          |        |   |   |
| 1853, For 1 dose                |       |          |        |   |   |
+---------------------------------+-------+----------+--------+---+---+
 
 
 
+---+---+
|   |   |
+---+---+
 
 
 
+-----------------------------------+-------+----------+-------+---+---+
|   isosorbide mononitrate (IMDUR)  | Given |  | 60 mg |   |   |
| 24 hr tablet 60 mg  60 mg, Oral,  |       | 9 14:02  |       |   |   |
| Daily, First dose on u 19  |       | PST      |       |   |   |
| at 0900                           |       |          |       |   |   |
+-----------------------------------+-------+----------+-------+---+---+
 
 
 
+-------+----------+-------+---+---+
| Given |  | 60 mg |   |   |
|       | 9 08:18  |       |   |   |
|       | PST      |       |   |   |
+-------+----------+-------+---+---+
 
 
 
+---+---+
 
|   |   |
+---+---+
 
 
 
+----------------------------------+-------+----------+--------+---+---+
|   lidocaine 1 % injection  Once  | Given |  | 10 mLs |   |   |
| PRN, During PCI per physician,   |       | 9 18:01  |        |   |   |
| Starting Wed 19 at 1801,    |       | PST      |        |   |   |
| Intra-procedure (CATH/IR)        |       |          |        |   |   |
+----------------------------------+-------+----------+--------+---+---+
 
 
 
+---+---+
|   |   |
+---+---+
 
 
 
+-----------------------------------+-------+----------+------+---+---+
|   LORazepam (ATIVAN) tablet 1 mg  | Given |  | 1 mg |   |   |
|  1 mg, Oral, Every 8 Hours PRN,   |       | 9 23:26  |      |   |   |
| Anxiety, Starting 19 at  |       | PST      |      |   |   |
| 1910                              |       |          |      |   |   |
+-----------------------------------+-------+----------+------+---+---+
 
 
 
+-------+----------+------+---+---+
| Given |  | 1 mg |   |   |
|       | 9 08:39  |      |   |   |
|       | PST      |      |   |   |
+-------+----------+------+---+---+
 
 
 
+---+---+
|   |   |
+---+---+
 
 
 
+----------------------------------+-------+----------+--------+---+---+
|   metroNIDAZOLE (FLAGYL) tablet  | Given |  | 500 mg |   |   |
| 500 mg  500 mg, Oral, Every 8    |       | 9 22:39  |        |   |   |
| Hours Scheduled (3 times per     |       | PST      |        |   |   |
| day), First dose on 19  |       |          |        |   |   |
| at 1400, Indications: Purulent   |       |          |        |   |   |
| Skin and Soft Tissue Infection   |       |          |        |   |   |
+----------------------------------+-------+----------+--------+---+---+
 
 
 
+-------+----------+--------+---+---+
| Given |  | 500 mg |   |   |
|       | 9 05:57  |        |   |   |
|       | PST      |        |   |   |
+-------+----------+--------+---+---+
| Given |  | 500 mg |   |   |
 
|       | 9 14:01  |        |   |   |
|       | PST      |        |   |   |
+-------+----------+--------+---+---+
 
 
 
+---+---+
|   |   |
+---+---+
 
 
 
+----------------------------------+-------+----------+------+---+---+
|   midazolam (VERSED) injection   | Given |  | 1 mg |   |   |
| Intravenous, Once PRN, Starting  |       | 9 18:00  |      |   |   |
| Wed 19 at 1800,             |       | PST      |      |   |   |
| Intra-procedure (CATH/IR)        |       |          |      |   |   |
+----------------------------------+-------+----------+------+---+---+
 
 
 
+-------+----------+------+---+---+
| Given |  | 1 mg |   |   |
|       | 9 18:23  |      |   |   |
|       | PST      |      |   |   |
+-------+----------+------+---+---+
 
 
 
+---+---+
|   |   |
+---+---+
 
 
 
+-----------------------------------+-------+----------+-------+---+---+
|   nortriptyline (PAMELOR) capsule | Given |  | 25 mg |   |   |
|  25 mg  25 mg, Oral, Every        |       | 9 08:18  |       |   |   |
| Morning, First dose on u        |       | PST      |       |   |   |
| 19 at 0900                   |       |          |       |   |   |
+-----------------------------------+-------+----------+-------+---+---+
 
 
 
+---+---+
|   |   |
+---+---+
 
 
 
+-----------------------------------+-------+----------+-------+---+---+
|   nortriptyline (PAMELOR) capsule | Given |  | 75 mg |   |   |
|  75 mg  75 mg, Oral, Nightly,     |       | 9 22:07  |       |   |   |
| First dose on 19 at 2200 |       | PST      |       |   |   |
+-----------------------------------+-------+----------+-------+---+---+
 
 
 
+-------+----------+-------+---+---+
| Given |  | 75 mg |   |   |
 
|       | 9 20:52  |       |   |   |
|       | PST      |       |   |   |
+-------+----------+-------+---+---+
 
 
 
+---+---+
|   |   |
+---+---+
 
 
 
+-----------------------------------+-------+----------+------+---+---+
|   ondansetron (ZOFRAN) injection  | Given |  | 4 mg |   |   |
| 4 mg  4 mg, Intravenous, Every 6  |       | 9 12:38  |      |   |   |
| Hours PRN, Nausea, Vomiting,      |       | PST      |      |   |   |
| Starting Wed 19 at 1910      |       |          |      |   |   |
+-----------------------------------+-------+----------+------+---+---+
 
 
 
+-------+----------+------+---+---+
| Given |  | 4 mg |   |   |
|       | 9 22:39  |      |   |   |
|       | PST      |      |   |   |
+-------+----------+------+---+---+
 
 
 
+-----------------------------------+---+
|                                   |   |
+-----------------------------------+---+
|   ondansetron (ZOFRAN-ODT)        |   |
| disintegrating tablet 4 mg  4 mg, |   |
|  Oral, Every 6 Hours PRN, Nausea, |   |
|  Vomiting, Starting 19   |   |
| at 1910                           |   |
+-----------------------------------+---+
|                                   |   |
+-----------------------------------+---+
 
 
 
+-----------------------------------+-------+----------+--------+---+---+
|   oxybutynin (DITROPAN) tablet    | Given |  | 2.5 mg |   |   |
| 2.5 mg  2.5 mg, Oral, 2 Times     |       | 9 21:59  |        |   |   |
| Daily, First dose on 19  |       | PST      |        |   |   |
| at 2100                           |       |          |        |   |   |
+-----------------------------------+-------+----------+--------+---+---+
 
 
 
+-------+----------+--------+---+---+
| Given |  | 2.5 mg |   |   |
|       | 9 20:51  |        |   |   |
|       | PST      |        |   |   |
+-------+----------+--------+---+---+
| Given |  | 2.5 mg |   |   |
|       | 9 08:18  |        |   |   |
|       | PST      |        |   |   |
 
+-------+----------+--------+---+---+
 
 
 
+---+---+
|   |   |
+---+---+
 
 
 
+-----------------------------------+-------+----------+-------+---+---+
|   pantoprazole (PROTONIX) EC      | Given |  | 40 mg |   |   |
| tablet 40 mg  40 mg, Oral, Every  |       | 9 06:26  |       |   |   |
| Morning Before Breakfast, First   |       | PST      |       |   |   |
| dose on u 19 at 0630       |       |          |       |   |   |
+-----------------------------------+-------+----------+-------+---+---+
 
 
 
+-------+----------+-------+---+---+
| Given |  | 40 mg |   |   |
|       | 9 05:57  |       |   |   |
|       | PST      |       |   |   |
+-------+----------+-------+---+---+
 
 
 
+---+---+
|   |   |
+---+---+
 
 
 
+-----------------------------------+-------+----------+---------+---+---+
|   rOPINIRole (REQUIP) tablet 0.75 | Given |  | 0.75 mg |   |   |
|  mg  0.75 mg, Oral, Nightly,      |       | 9 21:58  |         |   |   |
| First dose on 19 at 2200 |       | PST      |         |   |   |
+-----------------------------------+-------+----------+---------+---+---+
 
 
 
+-------+----------+---------+---+---+
| Given |  | 0.75 mg |   |   |
|       | 9 20:51  |         |   |   |
|       | PST      |         |   |   |
+-------+----------+---------+---+---+
 
 
 
+---+---+
|   |   |
+---+---+
 
 
 
+-----------------------------------+-------+----------+---+---+---+
|   silver sulfADIAZINE (SILVADENE) | Given |  |   |   |   |
|  1 % cream  Topical, 2 Times      |       | 9 13:00  |   |   |   |
| Daily, First dose on 19  |       | PST      |   |   |   |
| at 0930, On the right foot and    |       |          |   |   |   |
 
| cover with  telfa roll gauze and  |       |          |   |   |   |
| secure with  Tape.                |       |          |   |   |   |
+-----------------------------------+-------+----------+---+---+---+
 
 
 
+---+---+
|   |   |
+---+---+
 
 
 
+-----------------------------------+-------+----------+--------+---+---+
|   sodium chloride (PF) 0.9 %      | Given |  | 10 mLs |   |   |
| flush 10 mL  10 mL, Intravenous,  |       | 9 11:30  |        |   |   |
| Every 8 Hours, First dose on Wed  |       | PST      |        |   |   |
| 19 at 1930                   |       |          |        |   |   |
+-----------------------------------+-------+----------+--------+---+---+
 
 
 
+---+---+
|   |   |
+---+---+
 
 
 
+----------------------------------+---------+----------+-------+---+---+
|   vancomycin (VANCOCIN) 1500     | New Bag |  | 1.5 g |   |   |
| mg/250 mL IVPB  1.5 g,           |         | 9 18:12  |       |   |   |
| Intravenous, Administer over 90  |         | PST      |       |   |   |
| Minutes, Once, Wed 19 at    |         |          |       |   |   |
| 1432, For 1 dose                 |         |          |       |   |   |
+----------------------------------+---------+----------+-------+---+---+
 
 
 
+-----------------------------------+---+
|                                   |   |
+-----------------------------------+---+
|   vancomycin (VANCOCIN) 500 mg in |   |
|  sodium chloride (IV) 0.9 % 100   |   |
| mL IVPB  500 mg, Intravenous,     |   |
| Administer over 60 Minutes, See   |   |
| Admin Instructions, Starting Wed  |   |
| 19 at 1910, Administer dose  |   |
| during last hour or immediately   |   |
| following each Hemodialysis       |   |
| session. Use Rx button to message |   |
|  pharmacy for dose.               |   |
+-----------------------------------+---+
|                                   |   |
+-----------------------------------+---+
 
 
 
+-----------------------------------+-------+----------+--------+-------+---+
|   vancomycin (VANCOCIN) 500 mg in | Given |  | 500 mg | 100   |   |
|  sodium chloride (IV) 0.9 % 100   |       | 9 12:34  |        | mL/hr |   |
| mL IVPB  500 mg, Intravenous,     |       | PST      |        |       |   |
 
| Administer over 60 Minutes, Once, |       |          |        |       |   |
|  Fri 19 at 1230, For 1 dose  |       |          |        |       |   |
+-----------------------------------+-------+----------+--------+-------+---+
 
 
 
+---+---+
|   |   |
+---+---+
 in this encounter

## 2019-04-19 NOTE — XMS
Encounter Summary
  Created on: 2019
 
 Cherie Villalobos
 External Reference #: [id number]
 : 48
 Sex: Female
 
 Demographics
 
 
+-----------------------+--------------------------+
| Address               | [address]               |
|                       | [place]  [address] |
+-----------------------+--------------------------+
| Home Phone            | [phone]          |
+-----------------------+--------------------------+
| Preferred Language    | Unknown                  |
+-----------------------+--------------------------+
| Marital Status        |                   |
+-----------------------+--------------------------+
| Mu-ism Affiliation | [id number]                     |
+-----------------------+--------------------------+
| Race                  | Unknown                  |
+-----------------------+--------------------------+
| Ethnic Group          | Unknown                  |
+-----------------------+--------------------------+
 
 
 Author
 
 
+--------------+-----------------------+
| Author       | Sherry Qewz |
+--------------+-----------------------+
| Organization | [facility] |
+--------------+-----------------------+
| Address      | Unknown               |
+--------------+-----------------------+
| Phone        | Unavailable           |
+--------------+-----------------------+
 
 
 
 Support
 
 
+---------------+--------------+---------------------+-----------------+
| Name          | Relationship | Address             | Phone           |
+---------------+--------------+---------------------+-----------------+
| Anoop Villalobos | ECON         | [place], | [phone] |
|               |              |  [place]  [address]     |                 |
+---------------+--------------+---------------------+-----------------+
| Jennifer Dickson | ECON         | [place]       | [phone] |
|               |              | [phone]               |                 |
+---------------+--------------+---------------------+-----------------+
 
 
 
 
 Care Team Providers
 
 
+-----------------------+------+-----------------+
| Care Team Member Name | Role | Phone           |
+-----------------------+------+-----------------+
| Ivy Couch DO      | PCP  | [phone] |
+-----------------------+------+-----------------+
 
 
 
 Reason for Visit
 
 
+-----------+----------------------------------+
| Reason    | Comments                         |
+-----------+----------------------------------+
| Follow-up | PAD (peripheral artery disease)  |
+-----------+----------------------------------+
 
 
 
 Encounter Details
 
 
+--------+---------+----------------------+----------------------+----------------------+
| Date   | Type    | Department           | Care Team            | Description          |
+--------+---------+----------------------+----------------------+----------------------+
| / | Office  |   [facility]      |   Kirt Mclaughlin MD     | Steal syndrome as    |
| 2019   | Visit   | Vascular Surgery     | [address]  | complication of      |
|        |         | [address] |  [place] [address]  | dialysis access,     |
|        |         |  E  [place], [place]     |  [phone]        | sequela (Primary     |
|        |         | [address]           | [phone] (Fax)   | Dx); ESRD (end stage |
|        |         | [phone]         |                      |  renal disease)      |
|        |         |                      |                      | (HCC); PAD           |
|        |         |                      |                      | (peripheral artery   |
|        |         |                      |                      | disease) ([facility])       |
+--------+---------+----------------------+----------------------+----------------------+
 
 
 
 Social History
 
 
+---------------+------------+-----------+--------+------------------+
| Tobacco Use   | Types      | Packs/Day | Years  | Date             |
|               |            |           | Used   |                  |
+---------------+------------+-----------+--------+------------------+
| Former Smoker | Cigarettes | 1         | 30     | Quit: 2004 |
+---------------+------------+-----------+--------+------------------+
 
 
 
+---------------------+---+---+---+
| Smokeless Tobacco:  |   |   |   |
| Never Used          |   |   |   |
+---------------------+---+---+---+
 
 
 
 
+------------------------------+
| Comments: quite smoking  |
+------------------------------+
 
 
 
+-------------+-----------+---------+----------+
| Alcohol Use | Drinks/We | oz/Week | Comments |
|             | ek        |         |          |
+-------------+-----------+---------+----------+
| No          |           |         |          |
+-------------+-----------+---------+----------+
 
 
 
+------------------+---------------+
| Sex Assigned at  | Date Recorded |
| Birth            |               |
+------------------+---------------+
| Not on file      |               |
+------------------+---------------+
 as of this encounter
 
 Last Filed Vital Signs
 
 
+-------------------+---------+-------------------------+
| Vital Sign        | Reading | Time Taken              |
+-------------------+---------+-------------------------+
| Blood Pressure    | 130/60  | 2019  2:36 PM PST |
+-------------------+---------+-------------------------+
| Pulse             | 77      | 2019  2:36 PM PST |
+-------------------+---------+-------------------------+
| Temperature       | -       | -                       |
+-------------------+---------+-------------------------+
| Respiratory Rate  | -       | -                       |
+-------------------+---------+-------------------------+
| Oxygen Saturation | 100%    | 2019  2:36 PM PST |
+-------------------+---------+-------------------------+
| Inhaled Oxygen    | -       | -                       |
| Concentration     |         |                         |
+-------------------+---------+-------------------------+
| Weight            | -       | -                       |
+-------------------+---------+-------------------------+
| Height            | -       | -                       |
+-------------------+---------+-------------------------+
| Body Mass Index   | -       | -                       |
+-------------------+---------+-------------------------+
 in this encounter
 
 Progress Notes
 Kirt Mclaughlin MD - 2019  3:00 PM PSTFormatting of this note may be different from the o
aleksandar.
 Ms. Villalobos is a pleasant 68 y.o. female with PMH significant for acute MI, anemia, atrial f
ibrillation, COPD, CAD, diabetes, ESRD, hyperlipidemia, and hypertension, who presents today
 for wound follow up. Patient states she was in the hospital recently, she had an XR of her 
right great toe which showed a fracture. The area is healing. She also complains of pain and
 some discoloration of her left 2nd finger. Patient reports she has been having continued pa
in in her right toe, and at the site of her left forefoot amputation. Patient adds that she 
 
has not been able to follow up with her primary care physician as she is only in the office 
on , and the weather has made it difficult to get into town. 
 
 Results: X-ray foot right 3+ views 
 Impression 
 Linear lucency/irregularity at the distal 1st phalanx, may represent 
 minimally displaced fracture. 
 Signed by: Oleksandr Lemus 
 Sign Date/Time: 2019 10:20 AM 
 
 Past Medical History 
 Diagnosis Date 
   Acute MI ([facility])  
  with stenting x 1 
   Anemia associated with chronic renal failure 2016 
   Atrial fibrillation ([facility])  
   Chronic kidney disease  
   Congestive heart failure ([facility])  
   COPD (chronic obstructive pulmonary disease) ([facility])  
   COPD (chronic obstructive pulmonary disease) ([facility]) 2018 
   Coronary artery disease  
  stent placements: 3 total 
   Depression  
   Diabetes other 
  abstracted 
   Diabetes mellitus, type 2 ([facility])  
   ESRD on peritoneal dialysis ([facility])  
   GERD (gastroesophageal reflux disease)  
   Hemodialysis patient ([facility]) 10/2016 
  Stopped peritoneal and restarted hemodialysis 
   Hemorrhage of gastrointestinal tract, unspecified 2017 
  low GI, rectal bleeding 
   High blood pressure other 
  abstracted 
   High cholesterol other 
  abstracted 
   Hyperlipidemia  
   Hypertension  
   Hyponatremia 2017 
   Myocardial infarction ([facility]) 2017 
   Other chronic pain  
  feet,  
   Pain of left hip joint 2016 
  due to hip fracture and replacement 
   Partial small bowel obstruction ([facility]) 2017 
  resolved 
   Renal failure  
  Stage 5.   Fistula in the JAN - not on dialysis yet. 
   Unspecified visual disturbance  
  glasses 
 
 Past Surgical History 
 Procedure Laterality Date 
   AV FISTULA PLACEMENT   
  left upper arm AV fistula - failed 
   AV FISTULA REPAIR N/A 10/25/2016 
  Procedure: AV FISTULA - GRAFT REPAIR/REVISION;  Surgeon: Kirt Mclaughlin MD;  Location: [facility]
IN OR;  Service: Vascular;  Laterality: N/A;  Superficialization and revision of left arm fi
stula 
   CARDIAC CATHETERIZATION  2017 
 
   CARPAL TUNNEL RELEASE Bilateral other 
  abstracted 
   CATARACT EXTRACTION Bilateral  
   CATHETER REMOVAL N/A 2016 
  Procedure: DIALYSIS CATHETER - REMOVAL;  Surgeon: Kirt Mclaughlin MD;  Location: [facility] OR; 
 Service: Vascular;  Laterality: N/A;  PD cath removal 
   CHOLECYSTECTOMY  other 
  abstracted 
   COLONOSCOPY   
   CORONARY ARTERY BYPASS GRAFT   
   CORONARY STENT PLACEMENT  2017 
  Drug Elutin stents to OM, 1 stent to prox. LAD 
   EYE SURGERY   
   FOOT AMPUTATION Left 2018 
  Procedure: FOREFOOT - AMPUTATION;  Surgeon: Srinivasan Jordan DPM;  Location: [facility] MAIN OR;
  Service: Podiatry;  Laterality: Left; 
   HARDWARE PRESENT   
  stent placements x 3, Left hip replacement, vascular stents 2 in left leg 
   HIP ARTHROPLASTY Left 2016 
  Procedure: HIP - ARTHROPLASTY(ALLISON);  Surgeon: Uriel Roa MD;  Location: [facility] MAIN 
OR;  Service: Orthopedics;  Laterality: Left; 
   HYSTERECTOMY   
  complete 
   PACEMAKER INSERTION   
  [employer] pacemaker/defib 
   PERITONEAL CATHETER INSERTION  8/15/2013 
   PERITONEAL CATHETER INSERTION N/A 2016 
  Procedure: LAPAROSCOPIC - PERITONEAL DIALYSIS CATH INSERTION;  Surgeon: Kirt Mclaughlin MD;  Lo
cation: [facility] MAIN OR;  Service: Vascular;  Laterality: N/A;  Removal of old catheter 
   SHOULDER SURGERY Right  
  frozen shoulder 
   UNLISTED PROCEDURE ARTHROSCOPY   
  total Left hip 
   vascular stents Left 2017 
  leg (2 stents) 
   VASCULAR SURGERY   
 
 Social History 
 Substance Use Topics 
   Smoking status: Former Smoker 
   Packs/day: 1.00 
   Years: 30.00 
   Types: Cigarettes 
   Quit date: 2004 
   Smokeless tobacco: Never Used 
    Comment: quite smoking  
   Alcohol use No 
 
 Family History 
 Problem Relation Age of Onset 
   High cholesterol Father  
   Hypertension Father  
   Diabetes Paternal Grandmother  
   Maljoann hypertherm Neg Hx  
   Kidney disease Neg Hx  
 
 Current Outpatient Prescriptions on File Prior to Visit 
 Medication Sig Dispense Refill 
   albuterol (PROVENTIL HFA;VENTOLIN HFA) 108 (90 Base) MCG/ACT inhaler Inhale 2 puffs int
o the lungs every 4 (four) hours as needed for Wheezing.   
 
   apixaban (ELIQUIS) 2.5 MG tablet Take 1 tablet by mouth 2 (two) times daily. 60 tablet 
0 
   atorvastatin (LIPITOR) 80 MG tablet Take 80 mg by mouth nightly.   
   carvedilol (COREG) 12.5 MG tablet Take 1 tablet by mouth 2 (two) times daily with meals
. 60 tablet 0 
   esomeprazole (NEXIUM) 40 MG capsule Take 1 capsule by mouth every day   
   HYDROcodone-acetaminophen (NORCO) 5-325 MG per tablet Take 1 tablet by mouth every 4 (f
our) hours as needed. 30 tablet 0 
   insulin aspart (NOVOLOG) 100 UNIT/ML injection Inject  into the skin 3 (three) times da
melina before meals. Sliding scale   
   insulin degludec (TRESIBA) 100 UNIT/ML injection Inject 21 units every night   
   isosorbide mononitrate (IMDUR) 60 MG 24 hr tablet Take 1 tablet by mouth daily. 30 tabl
et 1 
   LORazepam (ATIVAN) 1 MG tablet Take 0.5 tablets by mouth every 8 (eight) hours as neede
d for Anxiety. Patient states she has the 1mg tablets at home and that they are scored and s
he will split them in half   
   losartan (COZAAR) 100 MG tablet Take 100 mg by mouth daily.   
   nitroGLYCERIN (NITROSTAT) 0.4 MG SL tablet Place 1 tablet under the tongue every 5 (fiv
e) minutes as needed for Chest pain. 30 tablet 0 
   nortriptyline (PAMELOR) 25 MG capsule Take 25-75 mg by mouth See Admin Instructions. Ta
kes 25 mg by mouth every morning and 75 mg every night   
   ondansetron (ZOFRAN-ODT) 4 MG disintegrating tablet Take 4 mg by mouth every 8 (eight) 
hours as needed.   
   oxybutynin (DITROPAN) 5 MG tablet Take 7.5 mg by mouth 2 (two) times daily.   
   prochlorperazine 5 MG tablet TAKE ONE TABLET BY MOUTH TWICE DAILY AS NEEDED FOR NAUSEA 
60 tablet 11 
   rOPINIRole (REQUIP) 0.25 MG tablet Take 0.75 mg by mouth nightly.   
   TRINTELLIX 20 MG TABS Take 20 mg by mouth every evening.   
 
 No current facility-administered medications on file prior to visit.  
 
 Allergies 
 Allergen Reactions 
   Digitoxin Other (See Comments) 
   Toxic, patient states her eyes were also affected "she seen yellow everywhere" 
   Morphine Mental Changes, Other (See Comments) and Anxiety 
   "makes me feel jittery"
 Other reaction(s): MAKES HER "CRAZY"
 Hallucinations
 Make her crazy/ "funny feeling in my head"
 Has used hydromorphone in-house 
   Penicillin G Hives 
   Penicillins Rash and Hives 
   Other reaction(s): RASH
 Tolerates cephalosporins 
   Quinapril Hcl Anaphylaxis 
   Quinapril Hcl Angioedema 
   Facial Edema 
   Digoxin And Related Other (See Comments) 
   toxic 
   Metaxalone Other (See Comments) 
   Pantoprazole Sodium Nausea and Vomiting 
   Quinapril Hcl Edema 
   Facial Edema 
   Sudafed [Pseudoephedrine Hcl] Rash 
 
 Comprehensive ROS performed and pertinent items described in the HPI. 
 
 Vitals:reviewed
 CONSTITUTIONAL:  Conversant, well developed, NAD
 
 EYES: Anicteric sclerae, no lid drag, no proptosis
 RESP: Normal effort, regular, even, unlabored rate
 CV: No peripheral edema, rate regular
 SKIN: Pink, warm, dry without rash/lesion
 MS: ROM not limited, no digital cyanosis, normal gait
 NEURO: Cranial nerves II-XII grossly intact, A&O times 3
 PSYCH: appropriate affect, speech and tone, judgement and insight intact
 Vascular:  Left AV fistula with a strong palpable thrill.  Toe wound healing well.  
 
 Discussed with the patient that her toe appears to be healing and there is no need for vasc
ular intervention on her right lower extremity. Educated the patient that her finger pain is
 likely the result of her fistula diverting too much blood from her hand. I advised the yslvester
ent to do her best to prevent any injury to her hand, in order to prevent ulcerations. The p
atient will need a fistulogram in the near future, which she can call the office to schedule
. All questions and concerns addressed. Patient will follow up after a fistulogram. Patient 
understands and is agreeable. 
 
 Attending Note: Documentation assistance provided by Steven Mike (David). Information r
ecorded by the [email or web address] has been reviewed and validated by me. I agree with its contents.
 
 Signed by: David Chavis 19, 3:07 PM
 
 Kirt Mclaughlin MD
 
 in this encounter
 
 Plan of Treatment
 
 
+--------+----------+-----------------+----------------------+-------------+
| Date   | Type     | Specialty       | Care Team            | Description |
+--------+----------+-----------------+----------------------+-------------+
| / | Initial  | General Surgery |   Сергей Medeiros, |             |
| 2019   | consult  |                 |  [address]  |             |
|        |          |                 |  [place] [address]  |             |
|        |          |                 |  [phone]        |             |
|        |          |                 | [phone] (Fax)   |             |
+--------+----------+-----------------+----------------------+-------------+
 as of this encounter
 
 Visit Diagnoses
 
 
+------------------------------------------------------------------------+
| Diagnosis                                                              |
+------------------------------------------------------------------------+
|   Steal syndrome as complication of dialysis access, sequela - Primary |
+------------------------------------------------------------------------+
|   ESRD (end stage renal disease) (HCC)                                 |
+------------------------------------------------------------------------+
|   End stage renal disease                                              |
+------------------------------------------------------------------------+
|   PAD (peripheral artery disease) (HCC)                                |
+------------------------------------------------------------------------+
|   Unspecified disorders of arteries and arterioles                     |
+------------------------------------------------------------------------+

## 2019-04-19 NOTE — XMS
Encounter Summary
  Created on: 2019
 
 Cherie Villalobos
 External Reference #: [id number]
 : 48
 Sex: Female
 
 Demographics
 
 
+-----------------------+--------------------------+
| Address               | [address]               |
|                       | [place]  [address] |
+-----------------------+--------------------------+
| Home Phone            | [phone]          |
+-----------------------+--------------------------+
| Preferred Language    | Unknown                  |
+-----------------------+--------------------------+
| Marital Status        |                   |
+-----------------------+--------------------------+
| Quaker Affiliation | [id number]                     |
+-----------------------+--------------------------+
| Race                  | Unknown                  |
+-----------------------+--------------------------+
| Ethnic Group          | Unknown                  |
+-----------------------+--------------------------+
 
 
 Author
 
 
+--------------+-----------------------+
| Author       | Sherry Kaleio |
+--------------+-----------------------+
| Organization | [facility] |
+--------------+-----------------------+
| Address      | Unknown               |
+--------------+-----------------------+
| Phone        | Unavailable           |
+--------------+-----------------------+
 
 
 
 Support
 
 
+---------------+--------------+---------------------+-----------------+
| Name          | Relationship | Address             | Phone           |
+---------------+--------------+---------------------+-----------------+
| Anoop Villalobos | ECON         | [place], | [phone] |
|               |              |  [place]  [address]     |                 |
+---------------+--------------+---------------------+-----------------+
| Jennifer Dickson | ECON         | [place]       | [phone] |
|               |              | [id number]               |                 |
+---------------+--------------+---------------------+-----------------+
 
 
 
 
 Care Team Providers
 
 
+-----------------------+------+-----------------+
| Care Team Member Name | Role | Phone           |
+-----------------------+------+-----------------+
| Ivy Couch DO      | PCP  | [phone] |
+-----------------------+------+-----------------+
 
 
 
 Encounter Details
 
 
+--------+------------+----------------------+-----------+-------------+
| Date   | Type       | Department           | Care Team | Description |
+--------+------------+----------------------+-----------+-------------+
| / | Procedure  |   [facility]    |           |             |
| 2019   | Pass       | [facility] [address]   |           |             |
|        |            | [address] |           |             |
|        |            |   [address]     |           |             |
|        |            | [place] [address]   |           |             |
|        |            | [phone]         |           |             |
+--------+------------+----------------------+-----------+-------------+
 
 
 
 Social History
 
 
+---------------+------------+-----------+--------+------------------+
| Tobacco Use   | Types      | Packs/Day | Years  | Date             |
|               |            |           | Used   |                  |
+---------------+------------+-----------+--------+------------------+
| Former Smoker | Cigarettes | 1         | 30     | Quit: 2004 |
+---------------+------------+-----------+--------+------------------+
 
 
 
+---------------------+---+---+---+
| Smokeless Tobacco:  |   |   |   |
| Never Used          |   |   |   |
+---------------------+---+---+---+
 
 
 
+------------------------------+
| Comments: quite smoking  |
+------------------------------+
 
 
 
+-------------+-----------+---------+----------+
| Alcohol Use | Drinks/We | oz/Week | Comments |
|             | ek        |         |          |
+-------------+-----------+---------+----------+
| No          |           |         |          |
+-------------+-----------+---------+----------+
 
 
 
 
+------------------+---------------+
| Sex Assigned at  | Date Recorded |
| Birth            |               |
+------------------+---------------+
| Not on file      |               |
+------------------+---------------+
 as of this encounter
 
 Plan of Treatment
 
 
+--------+----------+-----------------+----------------------+-------------+
| Date   | Type     | Specialty       | Care Team            | Description |
+--------+----------+-----------------+----------------------+-------------+
| / | Initial  | General Surgery |   Сергей Medeiros, |             |
| 2019   | consult  |                 |  MD [address]  |             |
|        |          |                 |  [place] [address]  |             |
|        |          |                 |  [phone]        |             |
|        |          |                 | [phone] (Fax)   |             |
+--------+----------+-----------------+----------------------+-------------+
 as of this encounter
 
 Visit Diagnoses
 Not on filein this encounter

## 2019-04-19 NOTE — XMS
Encounter Summary
  Created on: 2019
 
 Cherie Villalobos
 External Reference #: [id number]
 : 48
 Sex: Female
 
 Demographics
 
 
+-----------------------+--------------------------+
| Address               | [address]               |
|                       | [place]  [address] |
+-----------------------+--------------------------+
| Home Phone            | [phone]          |
+-----------------------+--------------------------+
| Preferred Language    | Unknown                  |
+-----------------------+--------------------------+
| Marital Status        |                   |
+-----------------------+--------------------------+
| Hindu Affiliation | 1013                     |
+-----------------------+--------------------------+
| Race                  | Unknown                  |
+-----------------------+--------------------------+
| Ethnic Group          | Unknown                  |
+-----------------------+--------------------------+
 
 
 Author
 
 
+--------------+-----------------------+
| Author       | [employer] |
+--------------+-----------------------+
| Organization | [facility] |
+--------------+-----------------------+
| Address      | Unknown               |
+--------------+-----------------------+
| Phone        | Unavailable           |
+--------------+-----------------------+
 
 
 
 Support
 
 
+---------------+--------------+---------------------+-----------------+
| Name          | Relationship | Address             | Phone           |
+---------------+--------------+---------------------+-----------------+
| Anoop Villalobos | ECON         | [place], | [phone] |
|               |              |  [place]  [address]     |                 |
+---------------+--------------+---------------------+-----------------+
| Jennifer Dickson | ECON         | [place]       | [phone] |
|               |              | [phone]               |                 |
+---------------+--------------+---------------------+-----------------+
 
 
 
 
 Care Team Providers
 
 
+-----------------------+------+-----------------+
| Care Team Member Name | Role | Phone           |
+-----------------------+------+-----------------+
| Ivy Couch DO      | PCP  | [phone] |
+-----------------------+------+-----------------+
 
 
 
 Reason for Visit
 
 
+--------------+-------------------------------------------------------------------+
| Reason       | Comments                                                          |
+--------------+-------------------------------------------------------------------+
| Post-op Exam | Post op, amputation, left foot. Patient pain level is 3/10 and    |
|              | relates she Fx her right hallux. Patient is unsure when her       |
|              | injury took place and went to [facility] ED and had xrays. Patient      |
|              | states she has been walking on the toe since going to the ED.     |
|              | Patient states her left foot continues to phantom pain. Patient   |
|              | states that other than phantom pain her left foot is doing well.  |
+--------------+-------------------------------------------------------------------+
 
 
 
 Encounter Details
 
 
+--------+---------+----------------------+----------------------+----------------------+
| Date   | Type    | Department           | Care Team            | Description          |
+--------+---------+----------------------+----------------------+----------------------+
| / | Office  |   [facility] |   Srinivasan Jordan,  | Closed nondisplaced  |
| 2019   | Visit   |  and Ankle  [address]      | DPM  [address], | fracture of distal   |
|        |         | [address]   |  [address]  [place],  | phalanx of right     |
|        |         | [facility] [place]         | [place] [address]             | great toe with       |
|        |         | [address]           | [phone]         | routine healing,     |
|        |         | [phone]         | [phone] (Fax)   | subsequent encounter |
|        |         |                      |                      |  (Primary Dx);       |
|        |         |                      |                      | Post-operative       |
|        |         |                      |                      | state; Toe pain,     |
|        |         |                      |                      | right                |
+--------+---------+----------------------+----------------------+----------------------+
 
 
 
 Social History
 
 
+---------------+------------+-----------+--------+------------------+
| Tobacco Use   | Types      | Packs/Day | Years  | Date             |
|               |            |           | Used   |                  |
+---------------+------------+-----------+--------+------------------+
| Former Smoker | Cigarettes | 1         | 30     | Quit: 2004 |
+---------------+------------+-----------+--------+------------------+
 
 
 
 
+---------------------+---+---+---+
| Smokeless Tobacco:  |   |   |   |
| Never Used          |   |   |   |
+---------------------+---+---+---+
 
 
 
+------------------------------+
| Comments: quite smoking  |
+------------------------------+
 
 
 
+-------------+-----------+---------+----------+
| Alcohol Use | Drinks/We | oz/Week | Comments |
|             | ek        |         |          |
+-------------+-----------+---------+----------+
| No          |           |         |          |
+-------------+-----------+---------+----------+
 
 
 
+------------------+---------------+
| Sex Assigned at  | Date Recorded |
| Birth            |               |
+------------------+---------------+
| Not on file      |               |
+------------------+---------------+
 as of this encounter
 
 Last Filed Vital Signs
 
 
+-------------------+---------------------+-------------------------+
| Vital Sign        | Reading             | Time Taken              |
+-------------------+---------------------+-------------------------+
| Blood Pressure    | 124/60              | 2019  2:40 PM PST |
+-------------------+---------------------+-------------------------+
| Pulse             | 92                  | 2019  2:40 PM PST |
+-------------------+---------------------+-------------------------+
| Temperature       | 36.8   C (98.2   F) | 2019  2:40 PM PST |
+-------------------+---------------------+-------------------------+
| Respiratory Rate  | -                   | -                       |
+-------------------+---------------------+-------------------------+
| Oxygen Saturation | 97%                 | 2019  2:40 PM PST |
+-------------------+---------------------+-------------------------+
| Inhaled Oxygen    | -                   | -                       |
| Concentration     |                     |                         |
+-------------------+---------------------+-------------------------+
| Weight            | -                   | -                       |
+-------------------+---------------------+-------------------------+
| Height            | -                   | -                       |
+-------------------+---------------------+-------------------------+
| Body Mass Index   | -                   | -                       |
+-------------------+---------------------+-------------------------+
 in this encounter
 
 Progress Notes
 Srinivasan Jordan DPM - 2019  2:45 PM PSTFormatting of this note may be different fro
m the original.
 
  
 Subjective: 
  Patient ID: Cherie Villalobos is a 68 y.o. female.
 Chief Complaint 
 Patient presents with 
   Post-op Exam 
   Post op, amputation, left foot. Patient pain level is 3/10 and relates she Fx her right h
allux. Patient is unsure when her injury took place and went to [facility] ED and had xrays. Cindi
nt states she has been walking on the toe since going to the ED.  Patient states her left fo
ot continues to phantom pain. Patient states that other than phantom pain her left foot is d
oing well.  
  
 
 HPI
 Patient returns today status post TMA left on 18.  She reports that she suffered a fa
ll and may have broken her right great toe.  Initially in the hospital she reports that she 
would told she would need amputation of the toe.  It is looking better over time but slowly.
 Her left foot is healing well after Trans-metatarsal amputation, no concerns with that. 
 
 The following portions of the patient's history were reviewed and updated as appropriate an
d is available elsewhere in the record: allergies, current medications, past family history,
 past medical history, past social history, past surgical history and problem list.
 
 ROS
 Denies any nausea, vomiting, fever, chills, shortness of breath, chest pain, or calf pain 
 
     
 Objective: 
 /60  | Pulse 92  | Temp 98.2 F (36.8 C)  | SpO2 97% 
 General observation:
 Constitutional: The patient is alert and oriented to person, place and time
 Psychiatric: The patient has normal affect and mood; her speech is normal; her behavior is 
normal.
 Respiratory: Effort normal and breath sounds normal. No accessory muscle usage. No respirat
ory distress.   
 
 Lower Extremity Examination:
 Trans-metatarsal amputation left, site healing well, two very small superficial wounds
 Wound, irregular and linear dorsal right great toe, no erythema, mild edema of toe
 No skin mottling. No hyperesthesias or paresthesias.
 No calf or thigh pain. Negative Homans sign. 
 pain on palpation of the interphalangeal joint of the hallux right dorsal
 
 Xr Toe Right 2 View
 
 Result Date: 2019
 No radiographic evidence of osteomyelitis seen.  If further assessment is warranted, consid
er correlation with MRI. Potential acute fracture through the base of the distal phalanx. Si
gned by: Gavin South Sign Date/Time: 2019 5:15 PM
 
 Us Lower Extremty  Arterial Right
 
 Result Date: 2019
 1. Right leg shows biphasic inflow with a greater than 50% stenosis at the origin of the dobbins
perficial femoral artery ([id number]).  Biphasic outflow. 2. Two vessel runoff to the right jeanne
t. Signed by: Eugenio Hoffman Sign Date/Time: 2019 3:11 PM
 
 Radiographs: .  See epic chart for complete read
 
 X-ray Foot Left
 
 
 Result Date: 2018
 Amputation of the left forefoot at the level of the proximal metatarsals. Surgical cutaneou
s staples are present. No radiographic evidence for osteomyelitis. Normal alignment of the h
indfoot. Mild degeneration of the midfoot. Electronically signed by Oleksandr Lemus MD on 2018 10:12 AM
 
 Ct Head Without Contrast
 
 Result Date: 2018
 1.  No acute intracranial pathology. Electronically signed by Steven Samano MD on  5:28 PM
 
 X-ray Chest 1 View
 
 Result Date: 2019
 1.  Small loculated pleural fluid collection seen overlying the lateral left heart border i
n the lower left chest.  There may also be a small pleural effusion at the right lung base w
hich is layering posteriorly. 2.  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 Electronically s
igned by Eugenio Hoffman DO on 2019 4:59 PM
 
 Angioplasty 18:
 1. Aorta with narrow aortic bifurcation with moderate stenosis of proximal left common errol
c artery. 
 2. Left SFA with moderate stenosis proximally. 
 3. Single vessel runoff to left foot via left anterior tibial artery. 
 4. Left posterior tibial artery and peroneal artery were occluded with reconstitution of di
stal posterior tibial artery at the left ankle via collaterals. 
 5. If forefoot amputation does not heal, may need popliteal to posterior tibial artery bypa
ss versus antegrade access of left common femoral artery with posterior tibial artery recana
lization. 
  
 Electronically signed by Kirt Mclaughlin MD on 2018 10:51 AM 
 
    
 Assessment and Plan: 
 S/p: TMA left on 18
 DM, ESRD, PAD
 Healing open fracture of right great toe?
 
 Radiographs taken, read and reviewed of involved foot/ankle and findings were discussed wit
h the patient
 Irregularity at the base of the distal phalanx concerning for avulsion fracture
 Wound appears stable dorsal right great toe
 Dressing change/applied today with dry sterile
 Rx diabetic shoes with toe filler left
 Continue use of removable cast boot until shoes received
 Fit with surgical shoe right, wear for all activities until DM shoes obtained
 Dress wound right great toe with bacitracin ointment daily with dry sterile dressing
 F/u 2 weeks
 
 Srinivasan Jordan DPM
  in this encounter
 
 Plan of Treatment
 
 
+--------+----------+-----------------+----------------------+-------------+
| Date   | Type     | Specialty       | Care Team            | Description |
+--------+----------+-----------------+----------------------+-------------+
 
| / | Initial  | General Surgery |   Сергей Medeiros, |             |
| 2019   | consult  |                 |  MD [address]  |             |
|        |          |                 |  [place] [address]  |             |
|        |          |                 |  [phone]        |             |
|        |          |                 | [phone] (Fax)   |             |
+--------+----------+-----------------+----------------------+-------------+
 as of this encounter
 
 Results
 X-ray foot right 3+ views (2019  2:58 PM)
 
+------------------------------------------------------------------------+--------------+
| Impressions                                                            | Performed At |
+------------------------------------------------------------------------+--------------+
|   Linear lucency/irregularity at the distal 1st phalanx, may represent |   [facility]     |
|   minimally displaced fracture.  Signed by: Oleksandr Lemus  Sign         | RADIOLOGY    |
| Date/Time: 2019 10:20 AM                                         |              |
+------------------------------------------------------------------------+--------------+
 
 
 
+------------------------------------------------------------------------+--------------+
| Narrative                                                              | Performed At |
+------------------------------------------------------------------------+--------------+
|   RIGHT FOOT THREE VIEWS  CLINICAL INFORMATION:  Possible fracture of  |   [facility]     |
| right hallux.  COMPARISON:  XR TOES RIGHT (2019); XR FOOT LEFT    | RADIOLOGY    |
| (2018);  FINDINGS:  No acute fracture or dislocation.  Small     |              |
| calcaneal plantar enthesophyte.  Linear lucency/irregularity at the    |              |
| distal 1st phalanx, may represent  minimally displaced fracture.  Mild |              |
|  midfoot degeneration.                                                 |              |
+------------------------------------------------------------------------+--------------+
 
 
 
+-------------------------------------------------------------------------+
| Procedure Note                                                          |
+-------------------------------------------------------------------------+
|   Lauro Baldwin Results In - 2019 10:23 AM PST  RIGHT FOOT THREE VIEWS |
| CLINICAL INFORMATION:                                                   |
| Possible fracture of right hallux.                                      |
| COMPARISON:                                                             |
| XR TOES RIGHT (2019); XR FOOT LEFT (2018);                   |
| FINDINGS:                                                               |
| No acute fracture or dislocation.                                       |
| Small calcaneal plantar enthesophyte.                                   |
| Linear lucency/irregularity at the distal 1st phalanx, may represent    |
| minimally displaced fracture.                                           |
| Mild midfoot degeneration.                                              |
| IMPRESSION:                                                             |
| Linear lucency/irregularity at the distal 1st phalanx, may represent    |
| minimally displaced fracture.                                           |
| Signed by: Oleksandr Lemus                                                 |
| Sign Date/Time: 2019 10:20 AM                                     |
+-------------------------------------------------------------------------+
 
 
 
+--------------------+------------------+--------------------+--------------+
| Performing         | Address          | City/State/Zipcode | Phone Number |
| Organization       |                  |                    |              |
 
+--------------------+------------------+--------------------+--------------+
|   [facility] |   [address] | [place] [address] |              |
+--------------------+------------------+--------------------+--------------+
 X-ray foot left 3 + views (2019  2:58 PM)
 
+----------------------------------------------------------------------+--------------+
| Impressions                                                          | Performed At |
+----------------------------------------------------------------------+--------------+
|   No acute fracture.    No radiographic evidence for osteomyelitis   |   [facility]     |
| Signed by: Oleksandr Lemus Date/Time: 2019 10:21 AM         | RADIOLOGY    |
+----------------------------------------------------------------------+--------------+
 
 
 
+------------------------------------------------------------------------+--------------+
| Narrative                                                              | Performed At |
+------------------------------------------------------------------------+--------------+
|   LEFT FOOT THREE VIEWS  CLINICAL INFORMATION:  8 weeks post op,       |   [facility]     |
| forefoot amputation.  COMPARISON:  XR TOES RIGHT (2019); XR FOOT  | RADIOLOGY    |
| LEFT (2018); XR FOOT LEFT  (2018);  FINDINGS:  Amputation  |              |
| of the forefoot at the level of the proximal metacarpals.  No          |              |
| radiographic evidence for osteomyelitis.  No acute fractures.          |              |
+------------------------------------------------------------------------+--------------+
 
 
 
+------------------------------------------------------------------------+
| Procedure Note                                                         |
+------------------------------------------------------------------------+
|   Lauro Baldwin Results In - 2019 10:25 AM PST  LEFT FOOT THREE VIEWS |
| CLINICAL INFORMATION:                                                  |
| 8 weeks post op, forefoot amputation.                                  |
| COMPARISON:                                                            |
| XR TOES RIGHT (2019); XR FOOT LEFT (2018); XR FOOT LEFT     |
| (2018);                                                          |
| FINDINGS:                                                              |
| Amputation of the forefoot at the level of the proximal metacarpals.   |
| No radiographic evidence for osteomyelitis.                            |
| No acute fractures.                                                    |
| IMPRESSION:                                                            |
| No acute fracture.  No radiographic evidence for osteomyelitis         |
| Signed by: Oleksandr Lemus                                                |
| Sign Date/Time: 2019 10:21 AM                                    |
+------------------------------------------------------------------------+
 
 
 
+--------------------+------------------+--------------------+--------------+
| Performing         | Address          | City/State/Zipcode | Phone Number |
| Organization       |                  |                    |              |
+--------------------+------------------+--------------------+--------------+
|   [facility] |   [address] | [place] [address] |              |
+--------------------+------------------+--------------------+--------------+
 in this encounter
 
 Visit Diagnoses
 
 
+-----------------------------------------------------------------------------------+
| Diagnosis                                                                         |
 
+-----------------------------------------------------------------------------------+
|   Closed nondisplaced fracture of distal phalanx of right great toe with routine  |
| healing, subsequent encounter - Primary                                           |
+-----------------------------------------------------------------------------------+
|   Post-operative state                                                            |
+-----------------------------------------------------------------------------------+
|   Other postprocedural status                                                     |
+-----------------------------------------------------------------------------------+
|   Toe pain, right                                                                 |
+-----------------------------------------------------------------------------------+
|   Pain in limb                                                                    |
+-----------------------------------------------------------------------------------+

## 2019-04-19 NOTE — XMS
Encounter Summary
  Created on: 2019
 
 Cherie Villalobos
 External Reference #: [id number]
 : 48
 Sex: Female
 
 Demographics
 
 
+-----------------------+--------------------------+
| Address               | [address]               |
|                       | [place]  [address] |
+-----------------------+--------------------------+
| Home Phone            | [phone]          |
+-----------------------+--------------------------+
| Preferred Language    | Unknown                  |
+-----------------------+--------------------------+
| Marital Status        |                   |
+-----------------------+--------------------------+
| Denominational Affiliation | 1013                     |
+-----------------------+--------------------------+
| Race                  | Unknown                  |
+-----------------------+--------------------------+
| Ethnic Group          | Unknown                  |
+-----------------------+--------------------------+
 
 
 Author
 
 
+--------------+-----------------------+
| Author       | [facility] |
+--------------+-----------------------+
| Organization | [facility] |
+--------------+-----------------------+
| Address      | Unknown               |
+--------------+-----------------------+
| Phone        | Unavailable           |
+--------------+-----------------------+
 
 
 
 Support
 
 
+---------------+--------------+---------------------+-----------------+
| Name          | Relationship | Address             | Phone           |
+---------------+--------------+---------------------+-----------------+
| Anoop Villalobos | ECON         | [place], | [phone] |
|               |              |  [place]  [address]     |                 |
+---------------+--------------+---------------------+-----------------+
| Jennifer Dickson | ECON         | [place]       | [phone] |
|               |              | [id number]               |                 |
+---------------+--------------+---------------------+-----------------+
 
 
 
 
 Care Team Providers
 
 
+-----------------------+------+-----------------+
| Care Team Member Name | Role | Phone           |
+-----------------------+------+-----------------+
| Ivy Couch DO      | PCP  | [phone] |
+-----------------------+------+-----------------+
 
 
 
 Encounter Details
 
 
+--------+-------------+----------------------+----------------------+-------------+
| Date   | Type        | Department           | Care Team            | Description |
+--------+-------------+----------------------+----------------------+-------------+
| / | Documentati |   [facility] RELEASE OF  |   See, Medical       |             |
| 2019   | on Only     | INFORMATION  888     | Record  [address]   |             |
|        |             | [address]           | [address], |             |
|        |             | [place] [address]   |  [place] [address]            |             |
|        |             | [phone]         | [phone]         |             |
|        |             |                      | [phone] (Fax)   |             |
+--------+-------------+----------------------+----------------------+-------------+
 
 
 
 Social History
 
 
+---------------+------------+-----------+--------+------------------+
| Tobacco Use   | Types      | Packs/Day | Years  | Date             |
|               |            |           | Used   |                  |
+---------------+------------+-----------+--------+------------------+
| Former Smoker | Cigarettes | 1         | 30     | Quit: 2004 |
+---------------+------------+-----------+--------+------------------+
 
 
 
+---------------------+---+---+---+
| Smokeless Tobacco:  |   |   |   |
| Never Used          |   |   |   |
+---------------------+---+---+---+
 
 
 
+------------------------------+
| Comments: quite smoking  |
+------------------------------+
 
 
 
+-------------+-----------+---------+----------+
| Alcohol Use | Drinks/We | oz/Week | Comments |
|             | ek        |         |          |
+-------------+-----------+---------+----------+
| No          |           |         |          |
+-------------+-----------+---------+----------+
 
 
 
 
+------------------+---------------+
| Sex Assigned at  | Date Recorded |
| Birth            |               |
+------------------+---------------+
| Not on file      |               |
+------------------+---------------+
 as of this encounter
 
 Plan of Treatment
 
 
+--------+----------+-----------------+----------------------+-------------+
| Date   | Type     | Specialty       | Care Team            | Description |
+--------+----------+-----------------+----------------------+-------------+
| / | Initial  | General Surgery |   Сергей Medeiros, |             |
|    | consult  |                 |  [place] [address]  |             |
|        |          |                 |  [place] [address]  |             |
|        |          |                 |  [phone]        |             |
|        |          |                 | [phone] (Fax)   |             |
+--------+----------+-----------------+----------------------+-------------+
 as of this encounter
 
 Visit Diagnoses
 Not on filein this encounter

## 2019-04-19 NOTE — XMS
Encounter Summary
  Created on: 2019
 
 Cherie Villalobos
 External Reference #: [id number]
 : 48
 Sex: Female
 
 Demographics
 
 
+-----------------------+--------------------------+
| Address               | [address]               |
|                       | [place]  [address] |
+-----------------------+--------------------------+
| Home Phone            | [phone]          |
+-----------------------+--------------------------+
| Preferred Language    | Unknown                  |
+-----------------------+--------------------------+
| Marital Status        |                   |
+-----------------------+--------------------------+
| Latter-day Affiliation | 1013                     |
+-----------------------+--------------------------+
| Race                  | Unknown                  |
+-----------------------+--------------------------+
| Ethnic Group          | Unknown                  |
+-----------------------+--------------------------+
 
 
 Author
 
 
+--------------+-----------------------+
| Author       | [facility] |
+--------------+-----------------------+
| Organization | [facility] |
+--------------+-----------------------+
| Address      | Unknown               |
+--------------+-----------------------+
| Phone        | Unavailable           |
+--------------+-----------------------+
 
 
 
 Support
 
 
+---------------+--------------+---------------------+-----------------+
| Name          | Relationship | Address             | Phone           |
+---------------+--------------+---------------------+-----------------+
| Anoop Villalobos | ECON         | [place], | [phone] |
|               |              |  [place]  [address]     |                 |
+---------------+--------------+---------------------+-----------------+
| Jennifer Dickson | ECON         | [place]       | [phone] |
|               |              | [address]               |                 |
+---------------+--------------+---------------------+-----------------+
 
 
 
 
 Care Team Providers
 
 
+-----------------------+------+-----------------+
| Care Team Member Name | Role | Phone           |
+-----------------------+------+-----------------+
| Ivy Couch DO      | PCP  | [phone] |
+-----------------------+------+-----------------+
 
 
 
 Encounter Details
 
 
+--------+------------+----------------------+-----------+-------------+
| Date   | Type       | Department           | Care Team | Description |
+--------+------------+----------------------+-----------+-------------+
| / | Procedure  |   [facility]    |           |             |
| 2019   | Pass       | [facility] [address]   |           |             |
|        |            | [address] [place] |           |             |
|        |            |   [address] [facility]     |           |             |
|        |            | [place] [address]   |           |             |
|        |            | [phone]         |           |             |
+--------+------------+----------------------+-----------+-------------+
 
 
 
 Social History
 
 
+---------------+------------+-----------+--------+------------------+
| Tobacco Use   | Types      | Packs/Day | Years  | Date             |
|               |            |           | Used   |                  |
+---------------+------------+-----------+--------+------------------+
| Former Smoker | Cigarettes | 1         | 30     | Quit: 2004 |
+---------------+------------+-----------+--------+------------------+
 
 
 
+---------------------+---+---+---+
| Smokeless Tobacco:  |   |   |   |
| Never Used          |   |   |   |
+---------------------+---+---+---+
 
 
 
+------------------------------+
| Comments: quite smoking  |
+------------------------------+
 
 
 
+-------------+-----------+---------+----------+
| Alcohol Use | Drinks/We | oz/Week | Comments |
|             | ek        |         |          |
+-------------+-----------+---------+----------+
| No          |           |         |          |
+-------------+-----------+---------+----------+
 
 
 
 
+------------------+---------------+
| Sex Assigned at  | Date Recorded |
| Birth            |               |
+------------------+---------------+
| Not on file      |               |
+------------------+---------------+
 as of this encounter
 
 Plan of Treatment
 
 
+--------+----------+-----------------+----------------------+-------------+
| Date   | Type     | Specialty       | Care Team            | Description |
+--------+----------+-----------------+----------------------+-------------+
| / | Initial  | General Surgery |   Сергей Medeiros, |             |
| 2019   | consult  |                 |  MD [address]  |             |
|        |          |                 |  [place] [address]  |             |
|        |          |                 |  [phone]        |             |
|        |          |                 | [phone] (Fax)   |             |
+--------+----------+-----------------+----------------------+-------------+
 as of this encounter
 
 Visit Diagnoses
 Not on filein this encounter

## 2019-04-19 NOTE — XMS
Encounter Summary
  Created on: 2019
 
 Cherie Villalobos
 External Reference #: [id number]
 : 48
 Sex: Female
 
 Demographics
 
 
+-----------------------+--------------------------+
| Address               | [address]               |
|                       | [place]  [address] |
+-----------------------+--------------------------+
| Home Phone            | [phone]          |
+-----------------------+--------------------------+
| Preferred Language    | Unknown                  |
+-----------------------+--------------------------+
| Marital Status        |                   |
+-----------------------+--------------------------+
| Pentecostalism Affiliation | 1013                     |
+-----------------------+--------------------------+
| Race                  | Unknown                  |
+-----------------------+--------------------------+
| Ethnic Group          | Unknown                  |
+-----------------------+--------------------------+
 
 
 Author
 
 
+--------------+--------------------------------------------+
| Author       | [facility]  |
|              | [facility] [place]                                |
+--------------+--------------------------------------------+
| Organization | [facility]  |
|              | [facility] [place]                                |
+--------------+--------------------------------------------+
| Address      | Unknown                                    |
+--------------+--------------------------------------------+
| Phone        | Unavailable                                |
+--------------+--------------------------------------------+
 
 
 
 Support
 
 
+---------------+--------------+---------------------+-----------------+
| Name          | Relationship | Address             | Phone           |
+---------------+--------------+---------------------+-----------------+
| Anoop Villalobos | ECON         | [address], | [phone] |
|               |              |  [place]  [address]     |                 |
+---------------+--------------+---------------------+-----------------+
| Jennifer Dickson | ECON         | [place]       | [phone] |
|               |              | [address]               |                 |
+---------------+--------------+---------------------+-----------------+
 
 
 
 
 Care Team Providers
 
 
+--------------------------+------+-----------------+
| Care Team Member Name    | Role | Phone           |
+--------------------------+------+-----------------+
| Araseli Montelongo [facility]  | PCP  | [phone] |
| Professional             |      |                 |
+--------------------------+------+-----------------+
 
 
 
 Reason for Visit
 
 
+-----------+---------------------------------+
| Reason    | Comments                        |
+-----------+---------------------------------+
| Lab Order | Pt due for labs prior to appt.  |
+-----------+---------------------------------+
 
 
 
 Encounter Details
 
 
+--------+-----------+----------------------+----------------------+--------------------+
| Date   | Type      | Department           | Care Team            | Description        |
+--------+-----------+----------------------+----------------------+--------------------+
| / | Telephone |   [facility]          |   Johnie John, | Lab Order (Pt due  |
|    |           | CARDIOLOGY  [address]    |  MD  [address] [place] | for labs prior to  |
|        |           | [place]  [place], |  [address].  [place],   | appt. )            |
|        |           |  [place] [address]       | [place] [address]             |                    |
|        |           | [phone]         | [phone]         |                    |
|        |           |                      | [phone] (Fax)   |                    |
+--------+-----------+----------------------+----------------------+--------------------+
 
 
 
 Social History
 
 
+---------------+------------+-----------+--------+------------------+
| Tobacco Use   | Types      | Packs/Day | Years  | Date             |
|               |            |           | Used   |                  |
+---------------+------------+-----------+--------+------------------+
| Former Smoker | Cigarettes | 1         | 30     | Quit: 2004 |
+---------------+------------+-----------+--------+------------------+
 
 
 
+---------------------+---+---+---+
| Smokeless Tobacco:  |   |   |   |
| Never Used          |   |   |   |
+---------------------+---+---+---+
 
 
 
 
+-------------+-----------+---------+----------+
| Alcohol Use | Drinks/We | oz/Week | Comments |
|             | ek        |         |          |
+-------------+-----------+---------+----------+
| No          |           |         |          |
+-------------+-----------+---------+----------+
 
 
 
+------------------+---------------+
| Sex Assigned at  | Date Recorded |
| Birth            |               |
+------------------+---------------+
| Not on file      |               |
+------------------+---------------+
 
 
 
+----------------+-------------+-------------+
| Job Start Date | Occupation  | Industry    |
+----------------+-------------+-------------+
| Not on file    | Not on file | Not on file |
+----------------+-------------+-------------+
 
 
 
+----------------+--------------+------------+
| Travel History | Travel Start | Travel End |
+----------------+--------------+------------+
 
 
 
+-------------------------------------+
| No recent travel history available. |
+-------------------------------------+
 documented as of this encounter
 
 Functional Status
 
 
+---------------------------------------------+----------+--------------------+
| Functional Status                           | Response | Date of Assessment |
+---------------------------------------------+----------+--------------------+
| Are you deaf or do you have serious         | No       | 2018         |
| difficulty hearing?                         |          |                    |
+---------------------------------------------+----------+--------------------+
| Are you blind or do you have serious        | No       | 2018         |
| difficulty seeing, even when wearing        |          |                    |
| glasses?                                    |          |                    |
+---------------------------------------------+----------+--------------------+
| Do you have serious difficulty walking or   | No       | 2018         |
| climbing stairs? (5 years old or older)     |          |                    |
+---------------------------------------------+----------+--------------------+
| Do you have difficulty dressing or bathing? | No       | 2018         |
|  (5 years old or older)                     |          |                    |
+---------------------------------------------+----------+--------------------+
| Because of a physical, mental, or emotional | No       | 2018         |
|  condition, do you have difficulty doing    |          |                    |
| errands alone such as visiting a doctor's   |          |                    |
 
| office or shopping? [15 years old or        |          |                    |
| older)]                                     |          |                    |
+---------------------------------------------+----------+--------------------+
 
 
 
+---------------------------------------------+----------+--------------------+
| Cognitive Status                            | Response | Date of Assessment |
+---------------------------------------------+----------+--------------------+
| Because of a physical, mental, or emotional | No       | 2018         |
|  condition, do you have serious difficulty  |          |                    |
| concentrating, remembering, or making       |          |                    |
| decisions? (5 years old or older)           |          |                    |
+---------------------------------------------+----------+--------------------+
 documented as of this encounter
 
 Plan of Treatment
 
 
+--------+---------+------------+----------------------+-------------+
| Date   | Type    | Specialty  | Care Team            | Description |
+--------+---------+------------+----------------------+-------------+
| / | Office  | Cardiology |   Johnie John, |             |
| 2019   | Visit   |            |  MD  [address] |             |
|        |         |            |  [place],   |             |
|        |         |            | [place] [address]             |             |
|        |         |            | [phone]         |             |
|        |         |            | [phone] (Fax)   |             |
+--------+---------+------------+----------------------+-------------+
 
 
 
+-------------+--------+----------------------+----------------------+
| Name        | Priori | Associated Diagnoses | Order Schedule       |
|             | ty     |                      |                      |
+-------------+--------+----------------------+----------------------+
| Lipid Panel | Routin |   Mixed              | 1 Occurrences        |
|             | e      | hyperlipidemia       | starting 2019  |
|             |        |                      | until 2020     |
+-------------+--------+----------------------+----------------------+
 documented as of this encounter
 
 Visit Diagnoses
 
 
+----------------------------------+
| Diagnosis                        |
+----------------------------------+
|   Mixed hyperlipidemia - Primary |
+----------------------------------+
 documented in this encounter

## 2019-04-19 NOTE — XMS
Encounter Summary
  Created on: 2019
 
 Cherie Villalobos
 External Reference #: [id number]
 : 48
 Sex: Female
 
 Demographics
 
 
+-----------------------+--------------------------+
| Address               | [address]               |
|                       | [place]  [address] |
+-----------------------+--------------------------+
| Home Phone            | [phone]          |
+-----------------------+--------------------------+
| Preferred Language    | Unknown                  |
+-----------------------+--------------------------+
| Marital Status        |                   |
+-----------------------+--------------------------+
| Zoroastrian Affiliation | 1013                     |
+-----------------------+--------------------------+
| Race                  | Unknown                  |
+-----------------------+--------------------------+
| Ethnic Group          | Unknown                  |
+-----------------------+--------------------------+
 
 
 Author
 
 
+--------------+-----------------------+
| Author       | [employer] |
+--------------+-----------------------+
| Organization | [facility] |
+--------------+-----------------------+
| Address      | Unknown               |
+--------------+-----------------------+
| Phone        | Unavailable           |
+--------------+-----------------------+
 
 
 
 Support
 
 
+---------------+--------------+---------------------+-----------------+
| Name          | Relationship | Address             | Phone           |
+---------------+--------------+---------------------+-----------------+
| Anoop Villalobos | ECON         | [place], | [phone] |
|               |              |  [place]  [address]     |                 |
+---------------+--------------+---------------------+-----------------+
| Jennifer Dickson | ECON         | [place]       | [phone] |
|               |              | [address]               |                 |
+---------------+--------------+---------------------+-----------------+
 
 
 
 
 Care Team Providers
 
 
+-----------------------+------+-----------------+
| Care Team Member Name | Role | Phone           |
+-----------------------+------+-----------------+
| Ivy Couch DO      | PCP  | [phone] |
+-----------------------+------+-----------------+
 
 
 
 Reason for Visit
 
 
+-----------+------------------------------------------------------------------+
| Reason    | Comments                                                         |
+-----------+------------------------------------------------------------------+
| Follow-up | Here today for follow up for right toes, left foot. States that  |
|           | there is a spot on the top of the amputation that hasn't fully   |
|           | closed up. Has been having a lot of phantom pain in the right    |
|           | foot. Has been taking tramadol. Pain 4/10 currently can get to   |
|           | 8/10.                                                            |
+-----------+------------------------------------------------------------------+
 
 
 
 Encounter Details
 
 
+--------+---------+----------------------+----------------------+----------------------+
| Date   | Type    | Department           | Care Team            | Description          |
+--------+---------+----------------------+----------------------+----------------------+
| / | Office  |   [facility] |   Srinivasan Jordan,  | Closed nondisplaced  |
| 2019   | Visit   |  and Ankle  [address]      | DPM  [address], | fracture of distal   |
|        |         | [address]   |  [address]  [place],  | phalanx of right     |
|        |         | [place], [place]         | [place] [address]             | great toe with       |
|        |         | [address]           | [phone]         | routine healing,     |
|        |         | [phone]         | [phone] (Fax)   | subsequent encounter |
|        |         |                      |                      |  (Primary Dx);       |
|        |         |                      |                      | Post-operative       |
|        |         |                      |                      | state; Open wound of |
|        |         |                      |                      |  toe, subsequent     |
|        |         |                      |                      | encounter            |
+--------+---------+----------------------+----------------------+----------------------+
 
 
 
 Social History
 
 
+---------------+------------+-----------+--------+------------------+
| Tobacco Use   | Types      | Packs/Day | Years  | Date             |
|               |            |           | Used   |                  |
+---------------+------------+-----------+--------+------------------+
| Former Smoker | Cigarettes | 1         | 30     | Quit: 2004 |
+---------------+------------+-----------+--------+------------------+
 
 
 
 
+---------------------+---+---+---+
| Smokeless Tobacco:  |   |   |   |
| Never Used          |   |   |   |
+---------------------+---+---+---+
 
 
 
+------------------------------+
| Comments: quite smoking  |
+------------------------------+
 
 
 
+-------------+-----------+---------+----------+
| Alcohol Use | Drinks/We | oz/Week | Comments |
|             | ek        |         |          |
+-------------+-----------+---------+----------+
| No          |           |         |          |
+-------------+-----------+---------+----------+
 
 
 
+------------------+---------------+
| Sex Assigned at  | Date Recorded |
| Birth            |               |
+------------------+---------------+
| Not on file      |               |
+------------------+---------------+
 as of this encounter
 
 Last Filed Vital Signs
 
 
+-------------------+---------+-------------------------+
| Vital Sign        | Reading | Time Taken              |
+-------------------+---------+-------------------------+
| Blood Pressure    | 109/52  | 2019 11:53 AM PST |
+-------------------+---------+-------------------------+
| Pulse             | 71      | 2019 11:53 AM PST |
+-------------------+---------+-------------------------+
| Temperature       | -       | -                       |
+-------------------+---------+-------------------------+
| Respiratory Rate  | -       | -                       |
+-------------------+---------+-------------------------+
| Oxygen Saturation | 96%     | 2019 11:53 AM PST |
+-------------------+---------+-------------------------+
| Inhaled Oxygen    | -       | -                       |
| Concentration     |         |                         |
+-------------------+---------+-------------------------+
| Weight            | -       | -                       |
+-------------------+---------+-------------------------+
| Height            | -       | -                       |
+-------------------+---------+-------------------------+
| Body Mass Index   | -       | -                       |
+-------------------+---------+-------------------------+
 in this encounter
 
 Progress Notes
 Srinivasan Jordan, REBEKAH - 2019  1:45 PM PSTFormatting of this note may be different fro
m the original.
 
  
 Subjective: 
  Patient ID: Cherie Villalobos is a 69 y.o. female.
 Chief Complaint 
 Patient presents with 
   Follow-up 
   Here today for follow up for right toes, left foot. States that there is a spot on the to
p of the amputation that hasn't fully closed up. Has been having a lot of phantom pain in th
e right foot. Has been taking tramadol. Pain 4/10 currently can get to 8/10. 
  
 
 HPI
 Patient returns today follow-up right great toe wound right foot
 Transmetatarsal amputation left foot remains healed.  She is in the process of obtaining ne
w diabetic shoes and inserts.
 
 The following portions of the patient's history were reviewed and updated as appropriate an
d is available elsewhere in the record: allergies, current medications, past family history,
 past medical history, past social history, past surgical history and problem list.
 
 ROS
 Denies any nausea, vomiting, fever, chills, shortness of breath, chest pain, or calf pain 
 
     
 Objective: 
 /52  | Pulse 71  | SpO2 96% 
 General observation:
 Constitutional: The patient is alert and oriented to person, place and time
 Psychiatric: The patient has normal affect and mood; her speech is normal; her behavior is 
normal.
 Respiratory: Effort normal and breath sounds normal. No accessory muscle usage. No respirat
ory distress.   
 
 Lower Extremity Examination:
 Trans-metatarsal amputation left, site healing well, two very small superficial wounds
 Wound, irregular and linear dorsal right great toe, no erythema, mild edema of toe, improve
d
 Toenail right great is thickened, may come loose over time
 No skin mottling. No hyperesthesias or paresthesias.
 No calf or thigh pain. Negative Homans sign. 
 No pain on palpation of the interphalangeal joint of the hallux right dorsal
 
 Xr Toe Right 2 View
 
 Result Date: 2019
 No radiographic evidence of osteomyelitis seen.  If further assessment is warranted, consid
er correlation with MRI. Potential acute fracture through the base of the distal phalanx. Si
gned by: Gavin South Sign Date/Time: 2019 5:15 PM
 
 Us Lower Extremty  Arterial Right
 
 Result Date: 2019
 1. Right leg shows biphasic inflow with a greater than 50% stenosis at the origin of the dobbins
perficial femoral artery ([id number]).  Biphasic outflow. 2. Two vessel runoff to the right jeanne
t. Signed by: Eugenio Hoffman Sign Date/Time: 2019 3:11 PM
 
 Radiographs: .  See epic chart for complete read
 
 X-ray Foot Left
 
 
 Result Date: 2018
 Amputation of the left forefoot at the level of the proximal metatarsals. Surgical cutaneou
s staples are present. No radiographic evidence for osteomyelitis. Normal alignment of the h
indfoot. Mild degeneration of the midfoot. Electronically signed by Oleksandr Lemus MD on 2018 10:12 AM
 
 Ct Head Without Contrast
 
 Result Date: 2018
 1.  No acute intracranial pathology. Electronically signed by Steven Samano MD on  5:28 PM
 
 X-ray Chest 1 View
 
 Result Date: 2019
 1.  Small loculated pleural fluid collection seen overlying the lateral left heart border i
n the lower left chest.  There may also be a small pleural effusion at the right lung base w
hich is layering posteriorly. 2.  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 Electronically s
igned by Eugenio Hoffman DO on 2019 4:59 PM
 
 Angioplasty 18:
 1. Aorta with narrow aortic bifurcation with moderate stenosis of proximal left common errol
c artery. 
 2. Left SFA with moderate stenosis proximally. 
 3. Single vessel runoff to left foot via left anterior tibial artery. 
 4. Left posterior tibial artery and peroneal artery were occluded with reconstitution of di
stal posterior tibial artery at the left ankle via collaterals. 
 5. If forefoot amputation does not heal, may need popliteal to posterior tibial artery bypa
ss versus antegrade access of left common femoral artery with posterior tibial artery recana
lization. 
  
 Electronically signed by Kirt Mclaughlin MD on 2018 10:51 AM 
 
    
 Assessment and Plan: 
 S/p: TMA left on 18
 DM, ESRD, PAD
 Open wound right great toe
 
 Rx mupirocin
 Continue use of surgical shoe right, removable cast boot left until diabetic shoes and inse
rts obtained
 Then transition to the use
 Follow-up in 1 month
 
 Srinivasan Jordan DPM
  in this encounter
 
 Plan of Treatment
 
 
+--------+----------+-----------------+----------------------+-------------+
| Date   | Type     | Specialty       | Care Team            | Description |
+--------+----------+-----------------+----------------------+-------------+
| / | Initial  | General Surgery |   Сергей Medeiros, |             |
|    | consult  |                 |  [address]  |             |
|        |          |                 |  [place] [address]  |             |
|        |          |                 |  [phone]        |             |
|        |          |                 | [phone] (Fax)   |             |
+--------+----------+-----------------+----------------------+-------------+
 
 as of this encounter
 
 Visit Diagnoses
 
 
+-----------------------------------------------------------------------------------+
| Diagnosis                                                                         |
+-----------------------------------------------------------------------------------+
|   Closed nondisplaced fracture of distal phalanx of right great toe with routine  |
| healing, subsequent encounter - Primary                                           |
+-----------------------------------------------------------------------------------+
|   Post-operative state                                                            |
+-----------------------------------------------------------------------------------+
|   Other postprocedural status                                                     |
+-----------------------------------------------------------------------------------+
|   Open wound of toe, subsequent encounter                                         |
+-----------------------------------------------------------------------------------+

## 2019-04-19 NOTE — XMS
Encounter Summary
  Created on: 2019
 
 Cherie Villalobos
 External Reference #: [id number]
 : 48
 Sex: Female
 
 Demographics
 
 
+-----------------------+--------------------------+
| Address               | [address]               |
|                       | [place]  [address] |
+-----------------------+--------------------------+
| Home Phone            | [phone]          |
+-----------------------+--------------------------+
| Preferred Language    | Unknown                  |
+-----------------------+--------------------------+
| Marital Status        |                   |
+-----------------------+--------------------------+
| Hinduism Affiliation | 1013                     |
+-----------------------+--------------------------+
| Race                  | Unknown                  |
+-----------------------+--------------------------+
| Ethnic Group          | Unknown                  |
+-----------------------+--------------------------+
 
 
 Author
 
 
+--------------+-----------------------+
| Author       | [email or web address] |
+--------------+-----------------------+
| Organization | [facility] |
+--------------+-----------------------+
| Address      | Unknown               |
+--------------+-----------------------+
| Phone        | Unavailable           |
+--------------+-----------------------+
 
 
 
 Support
 
 
+---------------+--------------+---------------------+-----------------+
| Name          | Relationship | Address             | Phone           |
+---------------+--------------+---------------------+-----------------+
| Anoop Villalobos | ECON         | [place], | [phone] |
|               |              |  [place]  [address]     |                 |
+---------------+--------------+---------------------+-----------------+
| Jennifer Dickson | ECON         | [place]       | [phone] |
|               |              | [address]               |                 |
+---------------+--------------+---------------------+-----------------+
 
 
 
 
 Care Team Providers
 
 
+-----------------------+------+-----------------+
| Care Team Member Name | Role | Phone           |
+-----------------------+------+-----------------+
| Ivy Couch DO      | PCP  | [phone] |
+-----------------------+------+-----------------+
 
 
 
 Encounter Details
 
 
+--------+------------+----------------------+-----------+-------------+
| Date   | Type       | Department           | Care Team | Description |
+--------+------------+----------------------+-----------+-------------+
| / | Procedure  |   [facility]    |           |             |
| 2019   | Pass       | [facility] [address]   |           |             |
|        |            | [address] |           |             |
|        |            |   [address] [facility] [address]     |           |             |
|        |            | [place] [address]   |           |             |
|        |            | [phone]         |           |             |
+--------+------------+----------------------+-----------+-------------+
 
 
 
 Social History
 
 
+---------------+------------+-----------+--------+------------------+
| Tobacco Use   | Types      | Packs/Day | Years  | Date             |
|               |            |           | Used   |                  |
+---------------+------------+-----------+--------+------------------+
| Former Smoker | Cigarettes | 1         | 30     | Quit: 2004 |
+---------------+------------+-----------+--------+------------------+
 
 
 
+---------------------+---+---+---+
| Smokeless Tobacco:  |   |   |   |
| Never Used          |   |   |   |
+---------------------+---+---+---+
 
 
 
+------------------------------+
| Comments: quite smoking  |
+------------------------------+
 
 
 
+-------------+-----------+---------+----------+
| Alcohol Use | Drinks/We | oz/Week | Comments |
|             | ek        |         |          |
+-------------+-----------+---------+----------+
| No          |           |         |          |
+-------------+-----------+---------+----------+
 
 
 
 
+------------------+---------------+
| Sex Assigned at  | Date Recorded |
| Birth            |               |
+------------------+---------------+
| Not on file      |               |
+------------------+---------------+
 as of this encounter
 
 Plan of Treatment
 
 
+--------+----------+-----------------+----------------------+-------------+
| Date   | Type     | Specialty       | Care Team            | Description |
+--------+----------+-----------------+----------------------+-------------+
| / | Initial  | General Surgery |   Сергей Medeiros, |             |
| 2019   | consult  |                 |  [address]  |             |
|        |          |                 |  [place] [address]  |             |
|        |          |                 |  [phone]        |             |
|        |          |                 | [phone] (Fax)   |             |
+--------+----------+-----------------+----------------------+-------------+
 as of this encounter
 
 Visit Diagnoses
 Not on filein this encounter

## 2019-04-19 NOTE — XMS
Encounter Summary
  Created on: 2019
 
 Cherie Villalobos
 External Reference #: [id number]
 : 48
 Sex: Female
 
 Demographics
 
 
+-----------------------+--------------------------+
| Address               | [address]               |
|                       | [place]  [address] |
+-----------------------+--------------------------+
| Home Phone            | [phone]          |
+-----------------------+--------------------------+
| Preferred Language    | Unknown                  |
+-----------------------+--------------------------+
| Marital Status        |                   |
+-----------------------+--------------------------+
| Yarsani Affiliation | 1013                     |
+-----------------------+--------------------------+
| Race                  | Unknown                  |
+-----------------------+--------------------------+
| Ethnic Group          | Unknown                  |
+-----------------------+--------------------------+
 
 
 Author
 
 
+--------------+-----------------------+
| Author       | [employer] |
+--------------+-----------------------+
| Organization | [facility] |
+--------------+-----------------------+
| Address      | Unknown               |
+--------------+-----------------------+
| Phone        | Unavailable           |
+--------------+-----------------------+
 
 
 
 Support
 
 
+---------------+--------------+---------------------+-----------------+
| Name          | Relationship | Address             | Phone           |
+---------------+--------------+---------------------+-----------------+
| Anoop Villalobos | ECON         | [place], | [phone] |
|               |              |  [place]  [address]     |                 |
+---------------+--------------+---------------------+-----------------+
| Jennifer Dickson | ECON         | [place]       | [phone] |
|               |              | [address]               |                 |
+---------------+--------------+---------------------+-----------------+
 
 
 
 
 Care Team Providers
 
 
+-----------------------+------+-----------------+
| Care Team Member Name | Role | Phone           |
+-----------------------+------+-----------------+
| Ivy Couch DO      | PCP  | [phone] |
+-----------------------+------+-----------------+
 
 
 
 Encounter Details
 
 
+--------+-------------+----------------------+---------------------+-------------+
| Date   | Type        | Department           | Care Team           | Description |
+--------+-------------+----------------------+---------------------+-------------+
| / | Orders Only |   [facility]      |   Dionne Danielson MA |             |
| 2019   |             | Vascular Surgery     |                     |             |
|        |             | [address] |                     |             |
|        |             |  E  [place]     |                     |             |
|        |             | [address]           |                     |             |
|        |             | [phone]         |                     |             |
+--------+-------------+----------------------+---------------------+-------------+
 
 
 
 Social History
 
 
+---------------+------------+-----------+--------+------------------+
| Tobacco Use   | Types      | Packs/Day | Years  | Date             |
|               |            |           | Used   |                  |
+---------------+------------+-----------+--------+------------------+
| Former Smoker | Cigarettes | 1         | 30     | Quit: 2004 |
+---------------+------------+-----------+--------+------------------+
 
 
 
+---------------------+---+---+---+
| Smokeless Tobacco:  |   |   |   |
| Never Used          |   |   |   |
+---------------------+---+---+---+
 
 
 
+------------------------------+
| Comments: quite smoking 2004 |
+------------------------------+
 
 
 
+-------------+-----------+---------+----------+
| Alcohol Use | Drinks/We | oz/Week | Comments |
|             | ek        |         |          |
+-------------+-----------+---------+----------+
| No          |           |         |          |
+-------------+-----------+---------+----------+
 
 
 
 
+------------------+---------------+
| Sex Assigned at  | Date Recorded |
| Birth            |               |
+------------------+---------------+
| Not on file      |               |
+------------------+---------------+
 as of this encounter
 
 Plan of Treatment
 
 
+--------+----------+-----------------+----------------------+-------------+
| Date   | Type     | Specialty       | Care Team            | Description |
+--------+----------+-----------------+----------------------+-------------+
| / | Initial  | General Surgery |   Сергей Medeiros, |             |
| 2019   | consult  |                 |  [address]  |             |
|        |          |                 |  [place] [address]  |             |
|        |          |                 |  [phone]        |             |
|        |          |                 | [phone] (Fax)   |             |
+--------+----------+-----------------+----------------------+-------------+
 as of this encounter
 
 Visit Diagnoses
 Not on filein this encounter

## 2019-04-19 NOTE — XMS
Clinical Summary
  Created on: 2019
 
 Cherie Villalobos
 External Reference #: [id number]
 : 48
 Sex: Female
 
 Demographics
 
 
+-----------------------+--------------------------+
| Address               | [address]               |
|                       | [place]  [address] |
+-----------------------+--------------------------+
| Home Phone            | [phone]          |
+-----------------------+--------------------------+
| Preferred Language    | Unknown                  |
+-----------------------+--------------------------+
| Marital Status        |                   |
+-----------------------+--------------------------+
| Taoism Affiliation | [id number]                     |
+-----------------------+--------------------------+
| Race                  | Unknown                  |
+-----------------------+--------------------------+
| Ethnic Group          | Unknown                  |
+-----------------------+--------------------------+
 
 
 Author
 
 
+--------------+--------------------------------------------+
| Author       | [facility]  |
|              | [facility] [place]                                |
+--------------+--------------------------------------------+
| Organization | [facility]  |
|              | [facility] [place]                                |
+--------------+--------------------------------------------+
| Address      | Unknown                                    |
+--------------+--------------------------------------------+
| Phone        | Unavailable                                |
+--------------+--------------------------------------------+
 
 
 
 Support
 
 
+---------------+--------------+---------------------+-----------------+
| Name          | Relationship | Address             | Phone           |
+---------------+--------------+---------------------+-----------------+
| Anoop Villalobos | ECON         | [address], | [phone] |
|               |              |  [place]  [address]     |                 |
+---------------+--------------+---------------------+-----------------+
| Jennifer Dickson | ECON         | [place], [place]       | [phone] |
|               |              | [id number]               |                 |
+---------------+--------------+---------------------+-----------------+
 
 
 
 
 Care Team Providers
 
 
+--------------------------+------+-----------------+
| Care Team Member Name    | Role | Phone           |
+--------------------------+------+-----------------+
| Araseli Montelongo [employer]  | PP   | [phone] |
| Professional             |      |                 |
+--------------------------+------+-----------------+
 
 
 
 Allergies
 
 
+---------------------+----------------------+----------+----------+----------------------+
| Active Allergy      | Reactions            | Severity | Noted    | Comments             |
|                     |                      |          | Date     |                      |
+---------------------+----------------------+----------+----------+----------------------+
| Digoxin And Related | Other (See Comments) | High     | 20 |   Toxic, patient     |
|                     |                      |          | 18       | states her eyes were |
|                     |                      |          |          |  also affected "she  |
|                     |                      |          |          | seen yellow          |
|                     |                      |          |          | everywhere"          |
+---------------------+----------------------+----------+----------+----------------------+
| Morphine            | Other (See Comments) | High     | 20 |   Hallucinations     |
|                     |                      |          | 18       |                      |
+---------------------+----------------------+----------+----------+----------------------+
| Penicillins         | Hives                | Medium   | 20 |                      |
|                     |                      |          | 18       |                      |
+---------------------+----------------------+----------+----------+----------------------+
| Pantoprazole Sodium | Nausea And Vomiting  | Medium   | 20 |                      |
|                     |                      |          | 18       |                      |
+---------------------+----------------------+----------+----------+----------------------+
| Pseudoephedrine     | Anxiety              | High     | 20 |                      |
|                     |                      |          | 18       |                      |
+---------------------+----------------------+----------+----------+----------------------+
| Quinapril Hcl       | Anaphylaxis          | High     | 20 |                      |
|                     |                      |          | 18       |                      |
+---------------------+----------------------+----------+----------+----------------------+
 
 
 
 Medications
 
 
+----------------------+----------------------+-----------+---------+------+------+-------+
| Medication           | Sig                  | Dispensed | Refills | Star | End  | Statu |
|                      |                      |           |         | t    | Date | s     |
|                      |                      |           |         | Date |      |       |
+----------------------+----------------------+-----------+---------+------+------+-------+
|   cholecalciferol    | Take 1 capsule by    |           | 0       |      |      | Activ |
| (VITAMIN D-3) 5000   | mouth Every other    |           |         |      |      | e     |
| units TABS           | day.                 |           |         |      |      |       |
+----------------------+----------------------+-----------+---------+------+------+-------+
|   nortriptyline      | Take 25 mg by mouth  |           | 0       |      |      | Activ |
| (PAMELOR) 25 mg      | 2 times daily. 1     |           |         |      |      | e     |
 
| capsule              | tablet in AM and 3   |           |         |      |      |       |
|                      | tablets in Evening   |           |         |      |      |       |
+----------------------+----------------------+-----------+---------+------+------+-------+
|   BD PEN NEEDLE LISA | Inject 4 Sticks      |           | 0       | 01/0 |      | Activ |
|  U/F 32G X 4 MM MISC | under the skin 4     |           |         | 9/20 |      | e     |
|                      | times daily as       |           |         | 18   |      |       |
|                      | needed.              |           |         |      |      |       |
+----------------------+----------------------+-----------+---------+------+------+-------+
|   oxybutynin         | Take 2.5 mg by mouth |           | 0       |      |      | Activ |
| (DITROPAN) 5 mg      |  2 times daily.      |           |         |      |      | e     |
| tablet               |                      |           |         |      |      |       |
+----------------------+----------------------+-----------+---------+------+------+-------+
|   prochlorperazine   | Take 5 mg by mouth   |           | 0       |      |      | Activ |
| (COMPAZINE) 5 mg     | Twice  daily as      |           |         |      |      | e     |
| tablet               | needed.              |           |         |      |      |       |
+----------------------+----------------------+-----------+---------+------+------+-------+
|   rOPINIRole         | Take 0.75 mg by      |           | 0       |      |      | Activ |
| (REQUIP) 0.25 mg     | mouth nightly. Takes |           |         |      |      | e     |
| tablet               |  3 tablets at night  |           |         |      |      |       |
+----------------------+----------------------+-----------+---------+------+------+-------+
|   insulin aspart     | Inject 1-6 Units     |   10 mL   | 0       | 02/2 |      | Activ |
| (NOVOLOG FLEXPEN)    | under the skin 3     |           |         | 2/20 |      | e     |
| 100 units/mL         | times daily (with    |           |         | 18   |      |       |
| injection            | meals). CORRECTION   |           |         |      |      |       |
| penIndications: Type | SCALE: Novolog 0-6   |           |         |      |      |       |
|  2 diabetes mellitus | units before meals,  |           |         |      |      |       |
|  with other          | bedtime SC.  Add to  |           |         |      |      |       |
| specified            | nutritional dose if  |           |         |      |      |       |
| complication, with   | applicable.  [BG <   |           |         |      |      |       |
| long-term current    | 150: None]  [BG      |           |         |      |      |       |
| use of insulin (HCC) | 150-200: 1 units]    |           |         |      |      |       |
|                      | [-250: 2       |           |         |      |      |       |
|                      | units]  [-300: |           |         |      |      |       |
|                      |  3 units]  [BG       |           |         |      |      |       |
|                      | 301-350: 4 units]    |           |         |      |      |       |
|                      | [-400: 5       |           |         |      |      |       |
|                      | units]  [BG > 400: 6 |           |         |      |      |       |
|                      |  units and call PCP] |           |         |      |      |       |
+----------------------+----------------------+-----------+---------+------+------+-------+
|   aspirin 81 mg      | Take 1 tablet by     |   90      | 0       | 02/2 |      | Activ |
| chewable tablet      | mouth Daily.         | tablet    |         | 4/20 |      | e     |
|                      |                      |           |         | 18   |      |       |
+----------------------+----------------------+-----------+---------+------+------+-------+
|   insulin degludec   | Inject 4 Units under |   1 pen   | 3       | 03/1 |      | Activ |
| (TRESIBA FLEXTOUCH)  |  the skin every      |           |         | 2/20 |      | e     |
| 100 units/mL         | evening.             |           |         | 18   |      |       |
| injectionIndications |                      |           |         |      |      |       |
| : Type 2 diabetes    |                      |           |         |      |      |       |
| mellitus with        |                      |           |         |      |      |       |
| chronic kidney       |                      |           |         |      |      |       |
| disease on chronic   |                      |           |         |      |      |       |
| dialysis, with       |                      |           |         |      |      |       |
| long-term current    |                      |           |         |      |      |       |
| use of insulin (HCC) |                      |           |         |      |      |       |
+----------------------+----------------------+-----------+---------+------+------+-------+
|   acetaminophen      | Take 2 tablets by    |   120     | 0       | 03/1 |      | Activ |
| (TYLENOL) 325 mg     | mouth every 4 hours  | tablet    |         | 6/20 |      | e     |
| tablet               | as needed for Pain   |           |         | 18   |      |       |
|                      | (or fever >= 38.6 C  |           |         |      |      |       |
|                      | (101.5 F)).          |           |         |      |      |       |
 
+----------------------+----------------------+-----------+---------+------+------+-------+
|   LORazepam (ATIVAN) | Take 1 tablet by     |   60      | 0       | 03/1 |      | Activ |
|  1 mg tablet         | mouth every 8 hours  | tablet    |         | 6/20 |      | e     |
|                      | as needed for        |           |         | 18   |      |       |
|                      | Anxiety.             |           |         |      |      |       |
+----------------------+----------------------+-----------+---------+------+------+-------+
|   albuterol 5 mg/mL  | Take 0.5 mLs by      |   20 vial | 0       | 03/1 |      | Activ |
| nebulizer solution   | nebulization every 4 |           |         | 6/20 |      | e     |
|                      |  hours as needed for |           |         | 18   |      |       |
|                      |  Shortness of Breath |           |         |      |      |       |
|                      |  or Increased Work   |           |         |      |      |       |
|                      | of Breathing.        |           |         |      |      |       |
+----------------------+----------------------+-----------+---------+------+------+-------+
|   losartan (COZAAR)  | Take 100 mg by mouth |           | 0       |      |      | Activ |
| 100 MG tablet        |  Daily.              |           |         |      |      | e     |
+----------------------+----------------------+-----------+---------+------+------+-------+
|   esomeprazole       | 2 times daily.       |           | 0       | 05/0 |      | Activ |
| (NEXIUM) 40 mg       |                      |           |         | 2/20 |      | e     |
| capsule              |                      |           |         | 18   |      |       |
+----------------------+----------------------+-----------+---------+------+------+-------+
|   TRINTELLIX 10 MG   | Daily.               |           | 0       | 04/2 |      | Activ |
| tablet               |                      |           |         | 4/20 |      | e     |
|                      |                      |           |         | 18   |      |       |
+----------------------+----------------------+-----------+---------+------+------+-------+
|   nitroglycerin      | Place 1 tablet under |   100     | 3       | 05/0 | 05/0 | Activ |
| (NITROSTAT) 0.4 mg   |  the tongue every 5  | tablet    |         | 7/20 | 7/20 | e     |
| SL tablet            | minutes as needed.   |           |         | 18   | 19   |       |
+----------------------+----------------------+-----------+---------+------+------+-------+
|   atorvaSTATin       | Take 1 tablet by     |           | 0       | 05/1 |      | Activ |
| (LIPITOR) 80 MG      | mouth nightly.       |           |         | 8/20 |      | e     |
| tablet               |                      |           |         | 18   |      |       |
+----------------------+----------------------+-----------+---------+------+------+-------+
|   docusate-senna     | Take 1 tablet by     |           | 0       |      |      | Activ |
| (SENOKOT-S) 50-8.6   | mouth as needed for  |           |         |      |      | e     |
| mg per tablet        | Constipation.        |           |         |      |      |       |
+----------------------+----------------------+-----------+---------+------+------+-------+
|   furosemide (LASIX) | TAKE 1 TABLET BY     |   30      | 3       | 09/0 |      | Activ |
|  20 mg tablet        | MOUTH ONCE DAILY     | tablet    |         |  |      | e     |
|                      |                      |           |         | 18   |      |       |
+----------------------+----------------------+-----------+---------+------+------+-------+
|                      | Take 1 tablet by     |           | 0       | 08/2 |      | Activ |
| HYDROcodone-acetamin | mouth as needed.     |           |         | 1/20 |      | e     |
| ophen (NORCO) 5-325  |                      |           |         | 18   |      |       |
| mg per tablet        |                      |           |         |      |      |       |
+----------------------+----------------------+-----------+---------+------+------+-------+
|   ondansetron        | Take 1 tablet by     |           | 0       | 08/2 |      | Activ |
| (ZOFRAN ODT) 4 mg    | mouth as needed.     |           |         | 1/20 |      | e     |
| disintegrating       |                      |           |         | 18   |      |       |
| tablet               |                      |           |         |      |      |       |
+----------------------+----------------------+-----------+---------+------+------+-------+
|   warfarin           | Take 2 mg by mouth   |           | 0       |      |      | Activ |
| (COUMADIN) 2 mg      | Daily. 1  tablet  |           |         |      |      | e     |
| tablet               | daily                |           |         |      |      |       |
+----------------------+----------------------+-----------+---------+------+------+-------+
|   traMADol (ULTRAM)  | Take 50 mg by mouth  |           | 0       |      |      | Activ |
| 50 mg tablet         | as needed.           |           |         |      |      | e     |
+----------------------+----------------------+-----------+---------+------+------+-------+
|   carvedilol (COREG) | Take 1.5 tablets by  |   90      | 5       |  |      | Activ |
|  12.5 mg tablet      | mouth 2 times daily  | tablet    |         |  |      | e     |
|                      | (with breakfast &    |           |         | 18   |      |       |
 
|                      | dinner).             |           |         |      |      |       |
+----------------------+----------------------+-----------+---------+------+------+-------+
|   ondansetron        | Take 1 tablet by     |   24      | 0       |  |      | Activ |
| (ZOFRAN ODT) 4 mg    | mouth every 8 hours  | tablet    |         |  |      | e     |
| disintegrating       | as needed for        |           |         | 18   |      |       |
| tablet               | Nausea.              |           |         |      |      |       |
+----------------------+----------------------+-----------+---------+------+------+-------+
 
 
 
 Active Problems
 
 
+------------+------------+
| Problem    | Noted Date |
+------------+------------+
| Chest pain | 2018 |
+------------+------------+
 
 
 
+-------------------------------------------------------------------+
|   Overview:   Chest XR 9/15/2018 - Findings are suggestive of     |
| mild pulmonary edema with small pleural effusions.  Borderline    |
| cardiomegaly. Edward Dailey MD Stress test done on 18     |
| shows pharmacologic stress notable for baseline abnormal ECG      |
| without diagnostic changes post stress, mild LV enlargement with  |
| LVEF 33% reviewed, perfusion imaging notable for moderate sized   |
| high intensity area of reduced uptake involving the mid to apical |
|  inferior myocardium as well as the LV apex with minimal          |
| reversibility, consistent with patient's known occluded RCA,  no  |
| significant additional ischemia is noted, overall this is         |
| consistent with the patient's known history of multivessel CAD    |
| and ischemic cardiomyopathy.X-Ray chest 2/10/2019 shows small     |
| bilateral pleural effusions with some mild probable bibasilar     |
| atelectasis, no fulminant pulmonary edema or focal infiltrate is  |
| seen, by Tip Pinto DO.                                  |
+-------------------------------------------------------------------+
 
 
 
+-------------------------------+------------+
| ACS (acute coronary syndrome) | 09/15/2018 |
+-------------------------------+------------+
 
 
 
+-------------------------------------------------------------------+
|   Overview:   Echocardiogram on 2018 shows, mild left        |
| ventricular chamber dilatation with upper normal wall thickness,  |
| moderate global left ventricular systolic dysfunction with a      |
| visually estimated left ventricular ejection fraction of 30-35%,  |
| mild left atrial chamber enlargement, surgically repaired mitral  |
| valve with normal function, mild aortic valve regurgitation, mild |
|  pulmonary arterial hypertension, when compared with prior        |
| echocardiogram dated 18, no significant interval change, by |
|  Dejan Breaux MD.                                               |
+-------------------------------------------------------------------+
 
 
 
 
+------------------------------------------------------------------+------------+
| Implantable defibrillator reprogramming/check                    | 2018 |
+------------------------------------------------------------------+------------+
| ICD (implantable cardioverter-defibrillator) [employer]   | 2018 |
| 18 Dora                                                 |            |
+------------------------------------------------------------------+------------+
 
 
 
+-------------------------------------------------------------------+
|   Overview:   Formatting of this note might be different from the |
|  original. MODEL NAME MODEL# SERIAL# DATE IMPLANTED GENERATOR     |
| [employer] Dynagen EL ICD [id number] 18 RV LEAD   |
| [employer] [place] 4 Site SG [id number] 18           |
| Indication: Nonischemic dilated cardiopathy with persistent       |
| severely depressed left ventricular systolic function, LVEF of    |
| LVEF of 30%                                                       |
+-------------------------------------------------------------------+
 
 
 
+------------------+------------+
| Pleural effusion | 2018 |
+------------------+------------+
 
 
 
+-------------------------------------------------------------------+
|   Last Assessment & Plan:   Post MV repair and CABG x2 2018  |
| and recurrent pleural effusions and elevated ESR. Plan:Received 1 |
|  time dose of Colchicine Started on Prednisone per surgery CXR    |
| today shows stable bilateral pleural effusions                    |
|Started on Prednisone per surgery                                  |
|CXR today shows stable bilateral pleural effusions                 |
+-------------------------------------------------------------------+
 
 
 
+---------------------------------------+------------+
| Steroid-induced hyperglycemia         | 2018 |
+---------------------------------------+------------+
| Moderate protein-calorie malnutrition | 2018 |
+---------------------------------------+------------+
| Chronic systolic heart failure        | 2018 |
+---------------------------------------+------------+
 
 
 
+-------------------------------------------------------------------+
|   Overview:   Echocardiogram 2018 shows mild biatrial        |
| dilatation, mild left ventricular dilatation with a mild          |
| eccentric left ventricular hypertrophy, there is a moderate       |
| global hypokinesis of left ventricle, lvef is 30-35%, normal      |
| right ventricular size and wall thickness with a mildly reduced   |
| right ventricular systolic function, mildly thickened and         |
| calcified trileaflet aortic valve with adequate opening, there is |
|  a mild aortic valve insufficiency, mildly thickened and          |
| calcified mitral valve suggesting myxomatous change with evidence |
|  of mitral valve repair, there is at least moderate central       |
 
| mitral valve regurgitation, mild tricuspid valve regurgitation,   |
| mild to moderate pulmonary hypertension with a peak systolic      |
| pressure of 50-55 mmhg, normal ivc with normal respiratory        |
| collapse, when compared to echocardiography on 3/7/18, left       |
| ventricular systolicsystolic function is slightly                 |
| improved.Echocardiogram 2018 show overall left ventricular    |
| systolic function is severely impaired with, an EF between 20 -   |
| 25 %, there is mild aortic regurgitation, there is mild aortic    |
| stenosis present, mild-to-moderate tricuspid regurgitation        |
| present, there is mild pulmonary hypertension, pleural effusion   |
| present. Duglas Ornelas MD, FACC; [facility]   Last Assessment & Plan:  |
|   EF 25% s/p CABG on 18 but now down to 15%. Plan:Continue   |
| Coreg and Losartan Hemodialysis for fluid removal.Strict I/O and  |
| daily weights.                                                    |
+-------------------------------------------------------------------+
 
 
 
+------------------------------------------+------------+
| Stress hyperglycemia                     | 2018 |
+------------------------------------------+------------+
| Anemia in ESRD (end-stage renal disease) | 2018 |
+------------------------------------------+------------+
| PAF (paroxysmal atrial fibrillation)     | 2018 |
+------------------------------------------+------------+
 
 
 
+-------------------------------------------------------------------+
|   Overview:   PAF with dialysis in the past and recurrent post op |
|  MV/CABG surgery.   Last Assessment & Plan:   Remains in          |
| NSRNormal atrial size by echocardiogram. Plan:Continue Coreg with |
|  up-titration as toleratedAmiodarone 400 mg BID while inpatient   |
| and then transition to 200 mg BID for 2 weeks and then 200 mg     |
| daily. Continue po 1-3 months post discharge. Continue warfarin   |
| for CVA prophylaxis, INR 2.3 today                                |
|Continue warfarin for CVA prophylaxis, INR 2.3 today               |
+-------------------------------------------------------------------+
 
 
 
+-------------------------+------------+
| S/P mitral valve repair | 2018 |
+-------------------------+------------+
 
 
 
+-------------------------------------------------------------------+
|   Overview:   Cristopher ring 2.16.18 (Frameri) for severe MR. POLY    |
| left. CABG too, SVG x 2 to OM and RCA.  Last Assessment & Plan:   |
|  Cristopher ring 2.16.18 (Frameri) for severe MR. POLY left. CABG too, |
|  SVG x 2 to OM and RCA.                                           |
+-------------------------------------------------------------------+
 
 
 
+--------------+------------+
| S/P CABG x 2 | 2018 |
+--------------+------------+
 
 
 
 
+------------------------------------------------------------------+
|   Overview:   SVG's to OM and RCA at time of MV ring Cristopher    |
| #28. POLY left.  Last Assessment & Plan:   18: Mitral valve  |
| repair with a 28 Cristopher annuloplasty ring; CABG x2 (SVG to OM  |
| and SVG to RCA)Plan:ASA, statin, BB and ARB                      |
|ASA, statin, BB and ARB                                           |
+------------------------------------------------------------------+
 
 
 
+----------------------------------+------------+
| Cardiomyopathy, unspecified type | 2018 |
+----------------------------------+------------+
 
 
 
+-------------------------------------------------------------------+
|   Overview:   Ischemic (3 V CAD) and non ischemic (Hypertension   |
| and MR and DM and ESRD). Tolerated metoprolol, losartan but avoid |
|  spironolactone due to ESRD. Echocardiogram done on 10/24/18      |
| shows mild biatrial dilatation, mild left ventricular dilatation  |
| with a mild eccentric left ventricular hypertrophy, there is a    |
| severe global hypokinesis of the left ventricle, overall, left    |
| ventricular systolic function is severely decreased, LVEF is      |
| 25-30%, mildly thickened and calcified trileaflet aortic valve    |
| with adequate opening, there is aortic valve sclerosis without    |
| significant aortic valve stenosis, there is a mild aortic valve   |
| insufficiency, mildly thickened and calcified mitral valve        |
| suggesting myxoma change with evidence of mitral valve repair,    |
| there is a mild to mitral valve stenosis and a mild mitral valve  |
| regurgitation, mild mitral annular calcification, mild tricuspid  |
| valve regurgitation, mild pulmonary hypertension with a peak      |
| systolic pressure 40-45 mmHg, normal IVC with normal respiratory  |
| collapse, when compared to echocardiography on 18, left      |
| ventricular systolic function continued to be worsened. Johnie    |
| MD Dora  Last Assessment & Plan:   Severe ischemic          |
| cardiomyopathy (EF 15%) and ESRD on HD who presented with dyspnea |
|  related to bilateral pleural effusions and episodic AF with RVR  |
| now s/p thoracentesis with significant improvement in her         |
| symptoms and in NSR. Plan:Volume removal with dialysis as         |
| toleratedLasix 80 mg twice a day Coreg and low dose losartan. Up  |
| titrate as tolerated but working on fluid removal and maintaining |
|  adequate BP. Strict I/O with daily weights.                      |
+-------------------------------------------------------------------+
 
 
 
+--------------------------------------------------------------+---+
| NSTEMI (non-ST elevated myocardial infarction)               |   |
+--------------------------------------------------------------+---+
| Coronary artery disease involving native coronary artery of  |   |
| native heart without angina pectoris                         |   |
+--------------------------------------------------------------+---+
 
 
 
+-------------------------------------------------------------------+
|   Overview:   Stress test on 2018 shows pharmacologic stress |
 
|  notable for baseline abnormal ECG without diagnostic changes     |
| post stress, mild LV enlargement with LVEF 33% reviewed,          |
| perfusion imaging notable for moderate sized high intensity area  |
| of reduced uptake involving the mid to apical inferior myocardium |
|  as well as the LV apex with minimal reversibility, consistent    |
| with patient's known occluded RCA, no significant additional      |
| ischemia is noted, overall this is consistent with the patient's  |
| known history of multivessel CAD and ischemic cardiomyopathy, by  |
| Anthony Gunn MD.Echocardiogram on 2018 shows mild left     |
| ventricular chamber dilatation with upper normal wall thickness,  |
| moderate global left ventricular systolic dysfunction with a      |
| visually estimated left ventricular ejection fraction of 30-35%,  |
| mild left atrial chamber enlargement, surgically repaired mitral  |
| valve with normal function, mild aortic valve regurgitation, mild |
|  pulmonary arterial hypertension, when compared with prior        |
| echocardiogram dated 18, no significant interval change, by |
|  Dejan Breaux MD. C on 2012 shows totally occluded right |
|  coronary artery with collateral filling from the left, no        |
| significant stenoses within the left anterior descending artery   |
| and circumflex artery. Patent stents within the left anterior     |
| descending artery, normal intracardiac pressure, mild narrowing   |
| within the distal aorta and iliac arteries. - Sherie Bartholomew,   |
| MDEchocardiogram on 2017 shows Study: a 2-dimensional        |
| transthoracic echocardiogram with m-mode, spectral and color flow |
|  Doppler was performed, left ventricular systolic function is     |
| low-normal with, an EF between 50 - 55 %, the left ventricle      |
| cavity size is normal, the RV is normal in size and function, the |
|  left atrium is normal in size, the right atrium is normal in     |
| size, aortic valve is trileaflet and is mildly thickened, there   |
| is mild aortic regurgitation, there is no evidence of aortic      |
| stenosis, the mitral valve is normal. - Kyler Pettit [facility] |
|  on 2017 shows severe triple-vessel coronary artery disease   |
| with moderate ostial left anterior descending artery and patent   |
| stent in the midsegment, moderate stenosis in the second obtuse   |
| marginal branch and total occlusion of the proximal right         |
| coronary artery, which is known from before, given the patient's  |
| symptoms at this time, recommendation given. The patient          |
| understands. We will proceed with further medical management with |
|  blood pressure control. Also, we will optimize our blood         |
| pressure management. Further evaluation for the ostial left       |
| anterior descending artery and circumflex lesion upon recurrent   |
| symptoms for the patient, the patient will be evaluated for any   |
| further symptoms and fractional flow reserve of the left anterior |
|  descending artery and possible intervention on the left          |
| circumflex system will be considered. - [facility]   |
| on 2017 shows severe 3-vessel coronary artery disease with   |
| moderate to severe proximal left anterior descending with         |
| significant fractional flow reserve value of 0.77, severe mid     |
| large marginal branch, and chronically occluded right coronary    |
| artery with left-to-right collaterals, normal left ventricular    |
| end-diastolic pressure. - [facility]     |
| shows three-vessel coronary artery disease with a chronically     |
| occluded right coronary artery with left-to-right collaterals,    |
| severe proximal left anterior descending artery with a patent     |
| stent in the mid left anterior descending artery and a            |
| significant FFR value of 0.77 during diagnostic angiogram on      |
| 2017, and severe disease of the second marginal branch |
|  of the left circumflex artery, successful PTCA and stenting of   |
| the second marginal branch of the left circumflex artery using a  |
| 2.25 x 16 and a 2.25 x 8 mm overlapping synergy drug-eluting      |
 
| stents postdilated using a 2.25 noncompliant balloon, successful  |
| direct stenting of the proximal and ostium of the left anterior   |
| descending artery using a 3.5 x 16 mm Synergy drug-eluting stent  |
| postdilated using a 3.5 noncompliant balloon. - Marcos Gambao, |
|  MDEchocardiogram on 2018 shows the left ventricle is normal |
|  in size, wall thickness and moderately impaired systolic         |
| function EF 35-40%. Inferior, inferolateral and anterolateral     |
| hypokinesis, the right ventricle is normal in size and function,  |
| severe mitral regurgitation with moderately dilated left atrium,  |
| mild tricuspid regurgitation and mild pulmonary hypertension RVSP |
|  48 mmHg, there is no pericardial effusion, new wall motion       |
| abnormalities and new severe mitral regurgitation compared to     |
| prior study. - ALYCIA Diane on 2018 shows         |
| three-vessel coronary artery disease with widely patent stent in  |
| the left anterior descending artery and severe stent restenosis   |
| in the marginal branch, occluded right coronary artery with       |
| collateral from left to right, severe left ventricular            |
| dysfunction with severe mitral regurgitation, status post         |
| percutaneous transluminal coronary angioplasty of in-stent        |
| restenosis within the marginal branch, recommend consideration    |
| for mitral valve replacement and 2-vessel coronary artery bypass  |
| surgery to the right coronary artery and marginal branch. - Iyad  |
| TONIA ChanE and CABG x2 on 2018 shows Severely reduced LV  |
| systolic function. Visually estimated LVEF 25%, normal LV size    |
| and shape. Normal wall thickness, normal RV size with normal      |
| function, LA enlarged. POLY normal size without SEC, masses, or    |
| thrombi. IAS intact, no PFO detected, mitral valve with bileaflet |
|  thickening and severely restricted motion, associated with       |
| ventricular tethering. Severe functional MR with central jet,     |
| trileaflet aortic valve with mild sclerosis. No significant       |
| aortic stenosis. Mild AI with central jet, tricuspid valve normal |
|  structure and function. Trace TR, pulmonic valve normal Trace    |
| CT, visible portions of ascending aorta and arch normal. Grade II |
|  atherosclerotic disease of descending aorta, pulmonary artery    |
| normal, normal pericardium. No pericardial or pleural effusions,  |
| left ventricular function slightly improved and right ventricular |
|  function unchanged compared to preop,  28 mm mitral valve        |
| annuloplasty ring is well-seated with an acceptable inflow        |
| gradient across the mitral valve and no MR noted, no change in    |
| the aortic, tricuspid, or pulmonic valves compared to preop, no   |
| change in visible portions of aorta after decannulation, LV and   |
| RV function unchanged after sternal closure. - Jagjit Hickey,       |
| MDEchocardiogram on 3/7/2018 shows a complete two-dimensional     |
| transthoracic echocardiogram was performed (2D, M-mode, Doppler   |
| and color flow Doppler), left ventricular systolic function is    |
| severely reduced, the visually estimated LV ejection fraction is  |
| 15%, the left and right atrial chambers are both normal in size,  |
| there is mild mitral regurgitation, an annuloplasty ring is noted |
|  in the mitral position, there is mild tricuspid regurgitation,   |
| Estimated PA pressure is 42 mmHg, the aortic valve leaflets       |
| appear mildly calcified, the aortic valve opens well, the aortic  |
| valve mean systolic gradient was measured at 9 mm Hg. - Star    |
| MD Alvarez  Last Assessment & Plan:   [facility] |
|  ago. 1 week off Plavix for CAGG/MVr surgery 16. Now and in the |
|  past with PAFAlso statin - data in dialysis patients for primary |
|  prevention with statin is not beneficial but this is secondary   |
| prevention. However, instead of high dose statin, moderate dose   |
| due to ESRD with slight increased risk of rhabdo. - Warfarin -    |
| ASA - Moderate dose Statin                                        |
+-------------------------------------------------------------------+
 
 
 
 
+----------------------+---+
| Mixed hyperlipidemia |   |
+----------------------+---+
 
 
 
+-------------------------------------------------------------+
|   Last Assessment & Plan:   Moderate dose statin and don't  |
| titrate due to ESRD and ASHD. - Continue Atorvastatin 20mg  |
+-------------------------------------------------------------+
 
 
 
+------------------------------------------------------------------+---+
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
| dialysis, with long-term current use of insulin                  |   |
+------------------------------------------------------------------+---+
| ESRD (end stage renal disease)                                   |   |
+------------------------------------------------------------------+---+
 
 
 
+--------------------------------------------------------+
|   Last Assessment & Plan:   Dialysis Tues, Thurs, Sat. |
| 2 L removal on HD yesterday and 1.1 L today            |
| Nephrology following.                                  |
| Chronic ESRD anemia.                                   |
|                                                        |
| Plan:                                                  |
| Continue Lasix and HD                                  |
+--------------------------------------------------------+
 
 
 
+-------------------------+---+
| Hypertension, essential |   |
+-------------------------+---+
 
 
 
+------------------------------------------------------------------+
|   Last Assessment & Plan:   Will resume metoprolol and losartan  |
| as BP allows.                                                    |
+------------------------------------------------------------------+
 
 
 
+----------------------------------------------+---+
| COPD (chronic obstructive pulmonary disease) |   |
+----------------------------------------------+---+
| Severe mitral regurgitation                  |   |
+----------------------------------------------+---+
 
 
 
 Resolved Problems
 
 
 
+-------------------------------+----------+-----------+
| Problem                       | Noted    | Resolved  |
|                               | Date     | Date      |
+-------------------------------+----------+-----------+
| Junctional cardiac arrhythmia | 20 |  |
|                               | 18       | 8         |
+-------------------------------+----------+-----------+
 
 
 
+----------------------------------------------------------------+
|   Last Assessment & Plan:   Persistent. Will stop Amiodarone.  |
+----------------------------------------------------------------+
 
 
 
 Encounters
 
 
+--------+-----------+-----------+----------------------+----------------------+
| Date   | Type      | Specialty | Care Team            | Description          |
+--------+-----------+-----------+----------------------+----------------------+
| / | Telephone |           |   Johnie John, | Other (angina)       |
| 2019   |           |           |  MD                  |                      |
+--------+-----------+-----------+----------------------+----------------------+
| / | Implant   |           |   Johnie John, | Remote Device        |
|    | Monitor   |           |  MD                  | Interrogation        |
|        |           |           |                      | (Primary Dx); ICD    |
|        |           |           |                      | (implantable         |
|        |           |           |                      | cardioverter-defibri |
|        |           |           |                      | llator) [employer]       |
|        |           |           |                      | Scientific  18   |
|        |           |           |                      | Dora;           |
|        |           |           |                      | Cardiomyopathy,      |
|        |           |           |                      | unspecified type     |
|        |           |           |                      | (HCC)                |
+--------+-----------+-----------+----------------------+----------------------+
| / | Telephone |           |   Johnie John, | Lab Order (Pt due    |
|    |           |           |  MD                  | for labs prior to    |
|        |           |           |                      | appt. )              |
+--------+-----------+-----------+----------------------+----------------------+
| / | Telephone |           |   Johnie John, | Other (medication    |
|    |           |           |  MD                  | changed due to ER    |
|        |           |           |                      | visit)               |
+--------+-----------+-----------+----------------------+----------------------+
 from Last 3 Months
 
 Family History
 
 
+------------------+-----------+------+----------+
| Medical History  | Relation  | Name | Comments |
+------------------+-----------+------+----------+
| High cholesterol | Father    |      |          |
+------------------+-----------+------+----------+
| Hypertension     | Father    |      |          |
+------------------+-----------+------+----------+
| PVD              | Father    |      |          |
+------------------+-----------+------+----------+
 
| Dementia         | Mother    |      |          |
+------------------+-----------+------+----------+
| Diabetes         | Paternal  |      |          |
|                  | Grandmoth |      |          |
|                  | er        |      |          |
+------------------+-----------+------+----------+
| Kidney disease   | Neg Hx    |      |          |
+------------------+-----------+------+----------+
 
 
 
+----------------------+------+----------+----------+
| Relation             | Name | Status   | Comments |
+----------------------+------+----------+----------+
| Father               |      |  | PVD      |
+----------------------+------+----------+----------+
| Mother               |      |  | Dementia |
+----------------------+------+----------+----------+
| Paternal Grandmother |      |  |          |
+----------------------+------+----------+----------+
 
 
 
 Social History
 
 
+---------------+------------+-----------+--------+------------------+
| Tobacco Use   | Types      | Packs/Day | Years  | Date             |
|               |            |           | Used   |                  |
+---------------+------------+-----------+--------+------------------+
| Former Smoker | Cigarettes | 1         | 30     | Quit: 2004 |
+---------------+------------+-----------+--------+------------------+
 
 
 
+---------------------+---+---+---+
| Smokeless Tobacco:  |   |   |   |
| Never Used          |   |   |   |
+---------------------+---+---+---+
 
 
 
+-------------+-----------+---------+----------+
| Alcohol Use | Drinks/We | oz/Week | Comments |
|             | ek        |         |          |
+-------------+-----------+---------+----------+
| No          |           |         |          |
+-------------+-----------+---------+----------+
 
 
 
+------------------+---------------+
| Sex Assigned at  | Date Recorded |
| Birth            |               |
+------------------+---------------+
| Not on file      |               |
+------------------+---------------+
 
 
 
 
+----------------+-------------+-------------+
| Job Start Date | Occupation  | Industry    |
+----------------+-------------+-------------+
| Not on file    | Not on file | Not on file |
+----------------+-------------+-------------+
 
 
 
+----------------+--------------+------------+
| Travel History | Travel Start | Travel End |
+----------------+--------------+------------+
 
 
 
+-------------------------------------+
| No recent travel history available. |
+-------------------------------------+
 
 
 
 Last Filed Vital Signs
 
 
+-------------------+----------------------+---------------------+
| Vital Sign        | Reading              | Time Taken          |
+-------------------+----------------------+---------------------+
| Blood Pressure    | 125/55               | 2018 PDT |
+-------------------+----------------------+---------------------+
| Pulse             | 62                   | 2018 PDT |
+-------------------+----------------------+---------------------+
| Temperature       | 37   C (98.6   F)    | 2018 PDT |
+-------------------+----------------------+---------------------+
| Respiratory Rate  | 16                   | 2018 PDT |
+-------------------+----------------------+---------------------+
| Oxygen Saturation | 97%                  | 2018 PDT |
+-------------------+----------------------+---------------------+
| Inhaled Oxygen    | -                    | -                   |
| Concentration     |                      |                     |
+-------------------+----------------------+---------------------+
| Weight            | 65.3 kg (143 lb 15.4 | 2018 06 PDT |
|                   |  oz)                 |                     |
+-------------------+----------------------+---------------------+
| Height            | 177.8 cm (5' 10")    | 09/15/2018 0045 PDT |
+-------------------+----------------------+---------------------+
| Body Mass Index   | 20.66                | 09/15/2018 0045 PDT |
+-------------------+----------------------+---------------------+
 
 
 
 Plan of Treatment
 
 
+--------+---------+-----------+----------------------+-------------+
| Date   | Type    | Specialty | Care Team            | Description |
+--------+---------+-----------+----------------------+-------------+
| / | Office  |           |   Johnie John, |             |
| 2019   | Visit   |           |  MD  [address] |             |
|        |         |           |  [place],   |             |
|        |         |           | [place] [address]             |             |
|        |         |           | [phone]         |             |
 
|        |         |           | [phone] (Fax)   |             |
+--------+---------+-----------+----------------------+-------------+
 
 
 
+----------------------+-----------+------------+----------+
| Health Maintenance   | Due Date  | Last Done  | Comments |
+----------------------+-----------+------------+----------+
| Hepatitis C          |  |            |          |
| Screening            | 9         |            |          |
+----------------------+-----------+------------+----------+
| Diabetic Eye Exam    |  |            |          |
|                      | 7         |            |          |
+----------------------+-----------+------------+----------+
| Diabetic Foot Exam   |  |            |          |
|                      | 7         |            |          |
+----------------------+-----------+------------+----------+
| Vaccine:             |  |            |          |
| Dtap/Tdap/Td (1 -    | 8         |            |          |
| Tdap)                |           |            |          |
+----------------------+-----------+------------+----------+
| Breast Cancer        |  |            |          |
| Screening (Ages      | 9         |            |          |
| 50-74)               |           |            |          |
+----------------------+-----------+------------+----------+
| Colorectal Cancer    |  |            |          |
| Screening            | 9         |            |          |
| (Colonoscopy)        |           |            |          |
+----------------------+-----------+------------+----------+
| Vaccine: Zoster (1   |  |            |          |
| of 2)                | 9         |            |          |
+----------------------+-----------+------------+----------+
| Vaccine:             |  | 2012 |          |
| Pneumococcal 65+     | 4         |            |          |
| High/Highest Risk (1 |           |            |          |
|  of 2 - PCV13)       |           |            |          |
+----------------------+-----------+------------+----------+
| Adult Annual         |  |            |          |
| Wellness Visit       | 6         |            |          |
+----------------------+-----------+------------+----------+
| Hemoglobin A1c       | 05/15/201 | 02/15/2018 |          |
| Screening            | 8         |            |          |
+----------------------+-----------+------------+----------+
| Vaccine: Influenza   |  |            |          |
| (Season Ended)       | 9         |            |          |
+----------------------+-----------+------------+----------+
 
 
 
 Implants
 
 
+-------------------------------+--------+-------+-------------+--------+--------+--------+
| Implanted                     | Type   | Area  | Manufacture | Device | Shelf  | Model  |
|                               |        |       | r           |        | Expira | /      |
|                               |        |       |             | Identi | tion   | Serial |
|                               |        |       |             | fier   | Date   |  / Lot |
+-------------------------------+--------+-------+-------------+--------+--------+--------+
| Ring Annlplst Cosgrv 28mm -   | Generi | N/A:  | CHRIS     |        | / | [id number] |
| [id number]: Qty: 1 on  | c      | Heart | LIFESCIENCE |        |    | MM     |
 
| 2018 by Anthony Beth   |        |       | [employer] -     |        |        | /[id number] |
| MD JIMMY                         |        |       | EDLS        |        |        | 34 /   |
+-------------------------------+--------+-------+-------------+--------+--------+--------+
| Rituagen El Icd Vr Implanted:  | Implan | N/A:  | BOSTON      |        | / | [id number]   |
| Qty: 1 on 2018 by       | table  | Chest | [employer]  |        |    | /[id number] |
| Johnie John MD         | Cardio |       | [facility] [employer] |        |        | 3      |
|                               | verter |       |             |        |        | / |
|                               |        |       |             |        |        | 9      |
|                               | Defibr |       |             |        |        |        |
|                               | illato |       |             |        |        |        |
|                               | r      |       |             |        |        |        |
+-------------------------------+--------+-------+-------------+--------+--------+--------+
| Endotak [place] [employer]           | Lead   | N/A:  | BOSTON      |        | 10/21/ | 292   |
| Implanted: Qty: 1 on          |        | Chest | [employer]  |        |    | /[id number] |
| 2018 by Dora,      |        |       | [employer] [employer] |        |        | 5      |
| MD Johnie                    |        |       |             |        |        | /[id number] |
|                               |        |       |             |        |        | 0      |
+-------------------------------+--------+-------+-------------+--------+--------+--------+
 
 
 
 Procedures
 
 
+-----------------+--------+-------------+----------------------+----------------------+
| Procedure Name  | Priori | Date/Time   | Associated Diagnosis | Comments             |
|                 | ty     |             |                      |                      |
+-----------------+--------+-------------+----------------------+----------------------+
| DEVICE          | Routin | 2019  |   Remote Device      |   Results for this   |
| INTERROGATION-  | e      | 23:59 PDT   | Interrogation   ICD  | procedure are in the |
| REMOTE          |        |             | (implantable         |  results section.    |
|                 |        |             | cardioverter-defibri |                      |
|                 |        |             | llator) [facility]       |                      |
|                 |        |             | Scientific  18   |                      |
|                 |        |             | Dora            |                      |
|                 |        |             | Cardiomyopathy,      |                      |
|                 |        |             | unspecified type     |                      |
|                 |        |             | ([facility])                |                      |
+-----------------+--------+-------------+----------------------+----------------------+
 from Last 3 Months
 
 Results
 Device Interrogation - Remote (2019 23:59 PDT)
 
+-----------------------------------------------------------------------+--------------+
| Narrative                                                             | Performed At |
+-----------------------------------------------------------------------+--------------+
|   This result has an attachment that is not available.  Johnie        |   [facility]    |
| MD Dora         2019 15:05Date of Remote Interrogation:    |              |
| 19 Refer to [employer] documentation and remote PDF scanned into    |              |
| EPIC for remote interrogation results.    Data collected by Clementina SALAZAR     |              |
| NAHUN Maynard Presenting rhythm:    sinus rhythm 59-60 beats.0           |              |
| ventricular high rate episodes. No tachycardia therapies recommended  |              |
| or delivered.Histogram    good.     Battery longevity                 |              |
| 12    years.Apparent normal and stable device function.Device         |              |
| interrogation due in office in 2019.                        |              |
|recommended or delivered.                                              |              |
|Histogram    good.     Battery longevity 12    years.                 |              |
|Apparent normal and stable device function.                            |              |
|Device interrogation due in office in 2019.                  |              |
 
|                                                                       |              |
+-----------------------------------------------------------------------+--------------+
 
 
 
+--------------+---------+--------------------+--------------+
| Performing   | Address | City/State/Zipcode | Phone Number |
| Organization |         |                    |              |
+--------------+---------+--------------------+--------------+
|   [facility]    |         |                    |              |
+--------------+---------+--------------------+--------------+
 from Last 3 Months
 
 Insurance
 
 
+-----------------+--------+-------------+--------+-------------+---------+--------+
| Payer           | Benefi | Subscriber  | Effect | Phone       | Address | Type   |
|                 | t Plan | ID          | rakesh    |             |         |        |
|                 |  /     |             | Dates  |             |         |        |
|                 | Group  |             |        |             |         |        |
+-----------------+--------+-------------+--------+-------------+---------+--------+
| MEDICARE        | MEDICA | [id number]  | 20 | [phone] |         | [employer] |
|                 | RE     |             | 03-Pre | 5           |         | re     |
|                 | PART A |             | sent   |             |         |        |
|                 |  AND B |             |        |             |         |        |
+-----------------+--------+-------------+--------+-------------+---------+--------+
| MEDICAID [place] | MEDICA | [id number]    |  | [phone] |         | [employer] |
|                 | [place]  |             | 018-Pr | 2           |         | id     |
|                 | PLUS   |             | esent  |             |         |        |
+-----------------+--------+-------------+--------+-------------+---------+--------+
 
 
 
+----------------------+--------+-------------+--------+-------------+-----------------+
| Guarantor Name       | Accoun | Relation to | Date   | Phone       | Billing Address |
|                      | t Type |  Patient    | of     |             |                 |
|                      |        |             | Birth  |             |                 |
+----------------------+--------+-------------+--------+-------------+-----------------+
| Cherie Villalobos | Person | Self        | / |             |   [address]    |
|                      | al/Fam |             | 1950   | [phone] | [place]    |
|                      | melina    |             |        | 0 (Home)    | [address]      |
+----------------------+--------+-------------+--------+-------------+-----------------+
 
 
 
 Advance Directives
 Patient has advance care planning documents, and code status on file. For more information,
 please contact:[facility] [place] [address]
 
+-------------+-------------+-------------+----------+
| Code Status | Date        | Date        | Comments |
|             | Activated   | Inactivated |          |
+-------------+-------------+-------------+----------+
| Full Code   | 9/15/2018   | 2018   |          |
|             | 0:51        | 19:33       |          |
+-------------+-------------+-------------+----------+
 
 
 
 
+-----------+-----------+------------+---+
|           |           |            |   |
+-----------+-----------+------------+---+
| Full Code | 3/6/2018  | 3/16/2018  |   |
|           | 17:36     | 13:11      |   |
+-----------+-----------+------------+---+
 
 
 
+-----------+------------+------------+---+
|           |            |            |   |
+-----------+------------+------------+---+
| Full Code | 2018  | 2018  |   |
|           | 17:49      | 14:01      |   |
+-----------+------------+------------+---+

## 2019-04-19 NOTE — XMS
Encounter Summary
  Created on: 2019
 
 Cherie Villalobos
 External Reference #: [id number]
 : 48
 Sex: Female
 
 Demographics
 
 
+-----------------------+--------------------------+
| Address               | [address]               |
|                       | [place]  [address] |
+-----------------------+--------------------------+
| Home Phone            | [phone]          |
+-----------------------+--------------------------+
| Preferred Language    | Unknown                  |
+-----------------------+--------------------------+
| Marital Status        |                   |
+-----------------------+--------------------------+
| Islam Affiliation | 1013                     |
+-----------------------+--------------------------+
| Race                  | Unknown                  |
+-----------------------+--------------------------+
| Ethnic Group          | Unknown                  |
+-----------------------+--------------------------+
 
 
 Author
 
 
+--------------+-----------------------+
| Author       | Sherry Makoondi |
+--------------+-----------------------+
| Organization | [facility] |
+--------------+-----------------------+
| Address      | Unknown               |
+--------------+-----------------------+
| Phone        | Unavailable           |
+--------------+-----------------------+
 
 
 
 Support
 
 
+---------------+--------------+---------------------+-----------------+
| Name          | Relationship | Address             | Phone           |
+---------------+--------------+---------------------+-----------------+
| Anoop Villalobos | ECON         | [place], | [phone] |
|               |              |  [place]  [phone]     |                 |
+---------------+--------------+---------------------+-----------------+
| Jennifer Dickson | ECON         | [place]       | [phone] |
|               |              | [phone]               |                 |
+---------------+--------------+---------------------+-----------------+
 
 
 
 
 Care Team Providers
 
 
+-----------------------+------+-----------------+
| Care Team Member Name | Role | Phone           |
+-----------------------+------+-----------------+
| Ivy Couch DO      | PCP  | [phone] |
+-----------------------+------+-----------------+
 
 
 
 Encounter Details
 
 
+--------+-----------+----------------------+----------------------+-------------+
| Date   | Type      | Department           | Care Team            | Description |
+--------+-----------+----------------------+----------------------+-------------+
| / | Telephone |   [facility]      |   Kristen Tam, |             |
| 2019   |           | Vascular Surgery     |  RN                  |             |
|        |           | [address] |                      |             |
|        |           |  E  [place]     |                      |             |
|        |           | [address]           |                      |             |
|        |           | [phone]         |                      |             |
+--------+-----------+----------------------+----------------------+-------------+
 
 
 
 Social History
 
 
+---------------+------------+-----------+--------+------------------+
| Tobacco Use   | Types      | Packs/Day | Years  | Date             |
|               |            |           | Used   |                  |
+---------------+------------+-----------+--------+------------------+
| Former Smoker | Cigarettes | 1         | 30     | Quit: 2004 |
+---------------+------------+-----------+--------+------------------+
 
 
 
+---------------------+---+---+---+
| Smokeless Tobacco:  |   |   |   |
| Never Used          |   |   |   |
+---------------------+---+---+---+
 
 
 
+------------------------------+
| Comments: quite smoking 2004 |
+------------------------------+
 
 
 
+-------------+-----------+---------+----------+
| Alcohol Use | Drinks/We | oz/Week | Comments |
|             | ek        |         |          |
+-------------+-----------+---------+----------+
| No          |           |         |          |
+-------------+-----------+---------+----------+
 
 
 
 
+------------------+---------------+
| Sex Assigned at  | Date Recorded |
| Birth            |               |
+------------------+---------------+
| Not on file      |               |
+------------------+---------------+
 as of this encounter
 
 Plan of Treatment
 
 
+--------+----------+-----------------+----------------------+-------------+
| Date   | Type     | Specialty       | Care Team            | Description |
+--------+----------+-----------------+----------------------+-------------+
| / | Initial  | General Surgery |   Сергей Medeiros, |             |
| 2019   | consult  |                 |  MD [address]  |             |
|        |          |                 |  [place] [phone]  |             |
|        |          |                 |  [phone]        |             |
|        |          |                 | [phone] (Fax)   |             |
+--------+----------+-----------------+----------------------+-------------+
 as of this encounter
 
 Visit Diagnoses
 Not on filein this encounter

## 2019-04-19 NOTE — XMS
Encounter Summary
  Created on: 2019
 
 Cherie Villalobos
 External Reference #: [id number]
 : 48
 Sex: Female
 
 Demographics
 
 
+-----------------------+--------------------------+
| Address               | [address]               |
|                       | [place]  [address] |
+-----------------------+--------------------------+
| Home Phone            | [phone]          |
+-----------------------+--------------------------+
| Preferred Language    | Unknown                  |
+-----------------------+--------------------------+
| Marital Status        |                   |
+-----------------------+--------------------------+
| Shinto Affiliation | 1013                     |
+-----------------------+--------------------------+
| Race                  | Unknown                  |
+-----------------------+--------------------------+
| Ethnic Group          | Unknown                  |
+-----------------------+--------------------------+
 
 
 Author
 
 
+--------------+-----------------------+
| Author       | [facility] |
+--------------+-----------------------+
| Organization | [facility] |
+--------------+-----------------------+
| Address      | Unknown               |
+--------------+-----------------------+
| Phone        | Unavailable           |
+--------------+-----------------------+
 
 
 
 Support
 
 
+---------------+--------------+---------------------+-----------------+
| Name          | Relationship | Address             | Phone           |
+---------------+--------------+---------------------+-----------------+
| Anoop Villalobos | ECON         | [place], | [phone] |
|               |              |  [place]  [address]     |                 |
+---------------+--------------+---------------------+-----------------+
| Jennifer Dickson | ECON         | [place]       | [phone] |
|               |              | [id number]               |                 |
+---------------+--------------+---------------------+-----------------+
 
 
 
 
 Care Team Providers
 
 
+-----------------------+------+-----------------+
| Care Team Member Name | Role | Phone           |
+-----------------------+------+-----------------+
| Ivy Couch DO      | PCP  | [phone] |
+-----------------------+------+-----------------+
 
 
 
 Reason for Visit
 
 
+---------------------+----------+
| Reason              | Comments |
+---------------------+----------+
| Multiple Falls      |          |
+---------------------+----------+
| Circulatory Problem |          |
+---------------------+----------+
| Referral            |          |
+---------------------+----------+
 
 
 
 Encounter Details
 
 
+--------+-----------+----------------------+----------------------+-------------+
| Date   | Type      | Department           | Care Team            | Description |
+--------+-----------+----------------------+----------------------+-------------+
| / | Emergency |   [facility]    |   Chau Deleon,  |             |
|    |           | [facility]       | MD [address]   |             |
|        |           | Emergency Department | EMERGENCY DEPARTMENT |             |
|        |           |   [address]     |   [place] [address] |             |
|        |           | [place] [address]   |   [phone]       |             |
|        |           | [phone]         | [phone] (Fax)   |             |
+--------+-----------+----------------------+----------------------+-------------+
 
 
 
 Social History
 
 
+---------------+------------+-----------+--------+------------------+
| Tobacco Use   | Types      | Packs/Day | Years  | Date             |
|               |            |           | Used   |                  |
+---------------+------------+-----------+--------+------------------+
| Former Smoker | Cigarettes | 1         | 30     | Quit: 2004 |
+---------------+------------+-----------+--------+------------------+
 
 
 
+---------------------+---+---+---+
| Smokeless Tobacco:  |   |   |   |
| Never Used          |   |   |   |
+---------------------+---+---+---+
 
 
 
 
+------------------------------+
| Comments: quite smoking  |
+------------------------------+
 
 
 
+-------------+-----------+---------+----------+
| Alcohol Use | Drinks/We | oz/Week | Comments |
|             | ek        |         |          |
+-------------+-----------+---------+----------+
| No          |           |         |          |
+-------------+-----------+---------+----------+
 
 
 
+------------------+---------------+
| Sex Assigned at  | Date Recorded |
| Birth            |               |
+------------------+---------------+
| Not on file      |               |
+------------------+---------------+
 as of this encounter
 
 Last Filed Vital Signs
 
 
+-------------------+----------------------+-------------------------+
| Vital Sign        | Reading              | Time Taken              |
+-------------------+----------------------+-------------------------+
| Blood Pressure    | 137/63               | 2019  4:05 PM PST |
+-------------------+----------------------+-------------------------+
| Pulse             | 87                   | 2019  4:05 PM PST |
+-------------------+----------------------+-------------------------+
| Temperature       | 36.3   C (97.3   F)  | 2019  4:05 PM PST |
+-------------------+----------------------+-------------------------+
| Respiratory Rate  | 16                   | 2019  4:05 PM PST |
+-------------------+----------------------+-------------------------+
| Oxygen Saturation | -                    | -                       |
+-------------------+----------------------+-------------------------+
| Inhaled Oxygen    | -                    | -                       |
| Concentration     |                      |                         |
+-------------------+----------------------+-------------------------+
| Weight            | 65.6 kg (144 lb 11.7 | 2019  4:05 PM PST |
|                   |  oz)                 |                         |
+-------------------+----------------------+-------------------------+
| Height            | -                    | -                       |
+-------------------+----------------------+-------------------------+
| Body Mass Index   | 20.77                | 2019  4:05 PM PST |
+-------------------+----------------------+-------------------------+
 in this encounter
 
 Medications at Time of Discharge
 
 
+----------------------+----------------------+--------+---------+----------+-----------+
| Medication           | Sig.                 | Disp.  | Refills | Start    | End Date  |
|                      |                      |        |         | Date     |           |
+----------------------+----------------------+--------+---------+----------+-----------+
 
|   albuterol          | Inhale 2 puffs into  |        |         |          |           |
| (PROVENTIL           | the lungs every 4    |        |         |          |           |
| HFA;VENTOLIN HFA)    | (four) hours as      |        |         |          |           |
| 108 (90 Base)        | needed for Wheezing. |        |         |          |           |
| MCG/ACT              |                      |        |         |          |           |
| inhalerIndications:  |                      |        |         |          |           |
| states uses 2x       |                      |        |         |          |           |
| monthly average      |                      |        |         |          |           |
+----------------------+----------------------+--------+---------+----------+-----------+
|   apixaban (ELIQUIS) | Take 1 tablet by     |   60   | 0       | 20 |           |
|  2.5 MG tablet       | mouth 2 (two) times  | tablet |         | 18       |           |
|                      | daily.               |        |         |          |           |
+----------------------+----------------------+--------+---------+----------+-----------+
|   atorvastatin       | Take 80 mg by mouth  |        |         | 20 |           |
| (LIPITOR) 80 MG      | nightly.             |        |         | 19       |           |
| tablet               |                      |        |         |          |           |
+----------------------+----------------------+--------+---------+----------+-----------+
|   carvedilol (COREG) | Take 1 tablet by     |   60   | 0       | 20 |  |
|  12.5 MG tablet      | mouth 2 (two) times  | tablet |         | 19       | 0         |
|                      | daily with meals.    |        |         |          |           |
+----------------------+----------------------+--------+---------+----------+-----------+
|   esomeprazole       | Take 1 capsule by    |        |         |          |           |
| (NEXIUM) 40 MG       | mouth every day      |        |         |          |           |
| capsule              |                      |        |         |          |           |
+----------------------+----------------------+--------+---------+----------+-----------+
|                      | Take 1 tablet by     |   30   | 0       | 20 |           |
| HYDROcodone-acetamin | mouth every 4 (four) | tablet |         | 19       |           |
| ophen (NORCO) 5-325  |  hours as needed.    |        |         |          |           |
| MG per tablet        |                      |        |         |          |           |
+----------------------+----------------------+--------+---------+----------+-----------+
|   insulin aspart     | Inject  into the     |        |         |          |           |
| (NOVOLOG) 100        | skin 3 (three) times |        |         |          |           |
| UNIT/ML injection    |  daily before meals. |        |         |          |           |
|                      |  Sliding scale       |        |         |          |           |
+----------------------+----------------------+--------+---------+----------+-----------+
|   insulin degludec   | Inject 21 units      |        |         |          |           |
| (TRESIBA) 100        | every night          |        |         |          |           |
| UNIT/ML injection    |                      |        |         |          |           |
+----------------------+----------------------+--------+---------+----------+-----------+
|   isosorbide         | Take 1 tablet by     |   30   | 1       | 20 |  |
| mononitrate (IMDUR)  | mouth daily.         | tablet |         | 18       | 9         |
| 60 MG 24 hr tablet   |                      |        |         |          |           |
+----------------------+----------------------+--------+---------+----------+-----------+
|   losartan (COZAAR)  | Take 100 mg by mouth |        |         | 20 |           |
| 100 MG tablet        |  daily.              |        |         | 19       |           |
+----------------------+----------------------+--------+---------+----------+-----------+
|   nitroGLYCERIN      | Place 1 tablet under |   30   | 0       | 20 |  |
| (NITROSTAT) 0.4 MG   |  the tongue every 5  | tablet |         | 19       | 0         |
| SL tablet            | (five) minutes as    |        |         |          |           |
|                      | needed for Chest     |        |         |          |           |
|                      | pain.                |        |         |          |           |
+----------------------+----------------------+--------+---------+----------+-----------+
|   nortriptyline      | Take 25-75 mg by     |        |         |          |           |
| (PAMELOR) 25 MG      | mouth See Admin      |        |         |          |           |
| capsule              | Instructions. Takes  |        |         |          |           |
|                      | 25 mg by mouth every |        |         |          |           |
|                      |  morning and 75 mg   |        |         |          |           |
|                      | every night          |        |         |          |           |
+----------------------+----------------------+--------+---------+----------+-----------+
|   ondansetron        | Take 4 mg by mouth   |        |         | 20 |           |
 
| (ZOFRAN-ODT) 4 MG    | every 8 (eight)      |        |         | 18       |           |
| disintegrating       | hours as needed.     |        |         |          |           |
| tablet               |                      |        |         |          |           |
+----------------------+----------------------+--------+---------+----------+-----------+
|   oxybutynin         | Take 7.5 mg by mouth |        |         |          |           |
| (DITROPAN) 5 MG      |  2 (two) times       |        |         |          |           |
| tablet               | daily.               |        |         |          |           |
+----------------------+----------------------+--------+---------+----------+-----------+
|   prochlorperazine 5 | TAKE ONE TABLET BY   |   60   | 11      | 20 |           |
|  MG tablet           | MOUTH TWICE DAILY AS | tablet |         | 19       |           |
|                      |  NEEDED FOR NAUSEA   |        |         |          |           |
+----------------------+----------------------+--------+---------+----------+-----------+
|   rOPINIRole         | Take 0.75 mg by      |        |         |          |           |
| (REQUIP) 0.25 MG     | mouth nightly.       |        |         |          |           |
| tablet               |                      |        |         |          |           |
+----------------------+----------------------+--------+---------+----------+-----------+
|   TRINTELLIX 20 MG   | Take 20 mg by mouth  |        |         | 20 |           |
| TABS                 | every evening.       |        |         | 19       |           |
+----------------------+----------------------+--------+---------+----------+-----------+
|   albuterol          | Ventolin HFA 90      |        |         |          |  |
| (VENTOLIN HFA) 108   | mcg/actuation        |        |         |          | 9         |
| (90 Base) MCG/ACT    | aerosol inhaler      |        |         |          |           |
| inhaler              | Inhale 2 puffs every |        |         |          |           |
|                      |  4 hours by          |        |         |          |           |
|                      | inhalation route as  |        |         |          |           |
|                      | needed.              |        |         |          |           |
+----------------------+----------------------+--------+---------+----------+-----------+
|   atorvastatin       | Take 1 tablet by     |   30   | 0       | 20 |  |
| (LIPITOR) 40 MG      | mouth nightly.       | tablet |         | 19       | 9         |
| tablet               |                      |        |         |          |           |
+----------------------+----------------------+--------+---------+----------+-----------+
|   bisacodyl          | Place 10 mg          |        |         |          |  |
| (DULCOLAX) 10 MG     | rectally.            |        |         |          | 9         |
| suppository          |                      |        |         |          |           |
+----------------------+----------------------+--------+---------+----------+-----------+
|   calcium acetate    | 667 mg 3 (three)     |        |         | 20 |  |
| (PHOSLO) 667 MG      | times daily with     |        |         | 16       | 9         |
| capsule              | meals.               |        |         |          |           |
+----------------------+----------------------+--------+---------+----------+-----------+
|   Cholecalciferol    | Take 5,000 capsules  |        |         |          |  |
| (VITAMIN D3) 5000    | by mouth every other |        |         |          | 9         |
| UNITS CAPS           |  day.                |        |         |          |           |
+----------------------+----------------------+--------+---------+----------+-----------+
|   clopidogrel        | Take 1 tablet by     |   30   | 11      | 20 |  |
| (PLAVIX) 75 MG       | mouth daily.         | tablet |         | 18       | 9         |
| tablet               |                      |        |         |          |           |
+----------------------+----------------------+--------+---------+----------+-----------+
|   loperamide         | 2 mg as needed.      |        |         |          |  |
| (IMODIUM) 2 MG       |                      |        |         |          | 9         |
| capsule              |                      |        |         |          |           |
+----------------------+----------------------+--------+---------+----------+-----------+
|   LORazepam (ATIVAN) | Take 1 tablet by     |   30   | 0       | 20 | 02/15/201 |
|  1 MG tablet         | mouth every 8        | tablet |         | 19       | 9         |
|                      | (eight) hours as     |        |         |          |           |
|                      | needed for Anxiety.  |        |         |          |           |
+----------------------+----------------------+--------+---------+----------+-----------+
|   Magnesium          | Take 1 tablet by     |        |         |          |  |
| Cl-Calcium Carbonate | mouth every other    |        |         |          | 9         |
|  (SLOW-MAG) 71.5-119 | day.                 |        |         |          |           |
|  MG TBEC             |                      |        |         |          |           |
 
+----------------------+----------------------+--------+---------+----------+-----------+
|   ranitidine         | ranitidine 150 mg    |        |         |          |  |
| (ZANTAC) 150 MG      | tablet Take 1 tablet |        |         |          | 9         |
| tablet               |  twice a day by oral |        |         |          |           |
|                      |  route.              |        |         |          |           |
+----------------------+----------------------+--------+---------+----------+-----------+
|   Vortioxetine HBr   | 10 mg nightly.       |        |         |          |  |
| 10 MG TABS           |                      |        |         |          | 9         |
+----------------------+----------------------+--------+---------+----------+-----------+
 as of this encounter
 
 Plan of Treatment
 
 
+--------+----------+-----------------+----------------------+-------------+
| Date   | Type     | Specialty       | Care Team            | Description |
+--------+----------+-----------------+----------------------+-------------+
| / | Initial  | General Surgery |   Сергей Medeiros, |             |
| 2019   | consult  |                 |  MD [address]  |             |
|        |          |                 |  [place] [address]  |             |
|        |          |                 |  [phone]        |             |
|        |          |                 | [phone] (Fax)   |             |
+--------+----------+-----------------+----------------------+-------------+
 as of this encounter
 
 Procedures
 
 
+-----------------+--------+-------------+----------------------+----------------------+
| Procedure Name  | Priori | Date/Time   | Associated Diagnosis | Comments             |
|                 | ty     |             |                      |                      |
+-----------------+--------+-------------+----------------------+----------------------+
| ED INFORMATION  | Routin | 2019  |                      |   Results for this   |
| EXCHANGE        | e      |  3:58 PM    |                      | procedure are in the |
|                 |        | PST         |                      |  results section.    |
+-----------------+--------+-------------+----------------------+----------------------+
 in this encounter
 
 Results
 ED INFORMATION EXCHANGE (2019  3:58 PM)
 
+------------------------------------------------------------------------+--------------+
| Narrative                                                              | Performed At |
+------------------------------------------------------------------------+--------------+
|   [id number]     Criteria Met         Care        |   ED         |
| Guidelines      10 in      Security and Safety  No recent Security   | INFORMATION  |
| Events currently on file     ED Care Guidelines from [facility] -        | EXCHANGE     |
| [place]  Last Updated: 18 10:16 AM         Other Information:    |              |
| Currently being seen by [facility] in [place] for mental health        |              |
| [phone]  These are guidelines and the provider should    |              |
| exercise clinical judgment when providing care.  ED Care Guidelines    |              |
| from [facility]  Last Updated: 17 10:44 AM         Care |              |
|  Coordination:  This patient has been identified as having at          |              |
| leastEmergency Departments visits in the 12 months immediately         |              |
| preceding the date these guidelines were entered.Patient requires      |              |
| education on appropriate ED usage.Emphasize the importance of using    |              |
| outpatient   medical services for the treatment of chronic conditions. |              |
|   Please contact Community Health WorkerWillard at [phone] if   |              |
| patient is seen in ED.  These are guidelines and the provider should   |              |
| exercise clinical judgment when providing care.  These are guidelines  |              |
 
| and the provider should exercise clinical judgment when providing      |              |
| care.           Prescription Drug Report (12 Mo.)  PDMP query found no |              |
|  report.        E.D. Visit Count (12 mo.)  Facility Visits Low Acuity  |              |
|   [facility] 18 0   [facility] 2 0   [facility]   |              |
| [facility] 4 0   Total 24 0   Note: Visits indicate total |              |
|  known visits. Medicaid Low Acuity Dx are the number of primary        |              |
| diagnoses on the Medicaid's Low Acuity dx list.         Recent         |              |
| Emergency Department Visit Summary  Showing 10 most recent visits out  |              |
| of 24 in the past 12 months  Date [facility] State Type Diagnoses   |              |
| or Chief Complaint   [facility]       |              |
| Emergency          Multiple Falls        Referral        Circulatory   |              |
| Problem      2019 [facility] [place] [place] Emergency      |              |
|      ABD PAIN        Other chest pain      2019 [facility]         |              |
| [facility] Emergency          Foot Pain      2019 |              |
|  [facility] Emergency          Chest Pain          |              |
| Nausea        Shortness of Breath        Chest pain, unspecified       |              |
|      Elevated blood-pressure reading, without diagnosis of             |              |
| hypertension        Presence of aortocoronary bypass graft             |              |
| Other specified postprocedural states      2019 [facility]  |              |
| [facility] [place] Emergency          CHEST PAIN        Abnormal levels  |              |
| of other serum enzymes        Personal history of other diseases of    |              |
| the circulatory system        Chest pain, unspecified      2019 |              |
|  [facility] [place] [place] Emergency          FISTULA BLEEDING    |              |
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
| grafts, initial encounter      Dec 22, 2018 [facility]       |              |
| [place]          CHEST PAIN        Type 2 diabetes         |              |
| mellitus with hyperglycemia        Chest pain, unspecified      Nov    |              |
| 2018 [facility] [place] Emergency    Chief Complaint:   |              |
| SOB      2018 [facility] [place] [facility]         |              |
|     FALL        Unspecified fall, initial encounter        Dependence  |              |
| on renal dialysis        End stage renal disease      2018     |              |
| [facility] [place] [facility]          FALL                 |              |
| Essential (primary) hypertension        Type 2 diabetes mellitus with  |              |
| hyperglycemia        Type 2 diabetes mellitus with unspecified         |              |
| complications        Unspecified fall, initial encounter               |              |
| Headache            Recent Inpatient Visit Summary  Showing 10 most    |              |
| recent visits out of 11 in the past 12 months  Date [facility]      |              |
| State Type Diagnoses or Chief Complaint   2019 [facility] |              |
|  [place]. [place] Vascular Surgery          Foot Pain        End stage   |              |
| renal disease        Dependence on renal dialysis        Peripheral    |              |
| vascular disease, unspecified        Anemia in chronic kidney disease  |              |
|        Other disorders of plasma-protein metabolism, not elsewhere     |              |
| classified        Other specified personal risk factors, not elsewhere |              |
|  classified      Dec 27, 2018 [facility] [place]. [place]           |              |
| Recovery          Peripheral arterial disease, osteomyelitis left foot |              |
|         Other acute osteomyelitis, left ankle and foot        Long     |              |
| term (current) use of antibiotics        End stage renal disease       |              |
|      Anemia in chronic kidney disease      Dec 5, 2018 [facility]              |
[facility]          Peripheral vascular disease, |              |
|  unspecified        End stage renal disease        Dependence on renal |              |
|  dialysis        Long term (current) use of insulin        Other       |              |
| chronic osteomyelitis, left ankle and foot        Type 2 diabetes      |              |
| mellitus with diabetic chronic kidney disease        Essential         |              |
| (primary) hypertension      [facility] [place] [place] [facility]          [facility]        Other chronic osteomyelitis,    |              |
| unspecified ankle and foot        End stage renal disease        Other |              |
|  specified personal risk factors, not elsewhere classified             |              |
| Chronic systolic (congestive) heart failure        Anemia in chronic   |              |
| kidney disease        Other disorders of plasma-protein metabolism,    |              |
| not elsewhere classified        Moderate protein-calorie malnutrition  |              |
 
|        Other disorders of phosphorus metabolism      2018      |              |
| [place]. [place] Medical Surgical          1. Type 2      |              |
| diabetes mellitus with other specified complication        2. Urinary  |              |
| tract infection, site not specified        3. Unspecified              |              |
| protein-calorie malnutrition        4. Other chronic osteomyelitis,    |              |
| unspecified site        5. Hypertensive heart and chronic kidney       |              |
| disease with heart failure and with stage 5 chronic kidney disease, or |              |
|  end stage renal disease        6. Chronic diastolic (congestive)      |              |
| heart failure        7. Other osteomyelitis, ankle and foot        8.  |              |
| Type 2 diabetes mellitus with foot ulcer        9. Atherosclerotic     |              |
| heart disease of native coronary artery without angina pectoris        |              |
|  10. Chronic obstructive pulmonary disease, unspecified      ,    |              |
|  [facility] [place] [place] [facility]          1. Benign |              |
|  paroxysmal vertigo, unspecified ear        2. End stage renal disease |              |
|         3. Hypertensive chronic kidney disease with stage 5 chronic    |              |
| kidney disease or end stage renal disease        4. Urinary tract      |              |
| infection, site not specified        5. Hypertensive heart and chronic |              |
|  kidney disease with heart failure and with stage 5 chronic kidney     |              |
| disease, or end stage renal disease        6. Kidney transplant status |              |
|         7. Unspecified systolic (congestive) heart failure        8.   |              |
| Syncope and collapse        9. Type 2 diabetes mellitus with diabetic  |              |
| chronic kidney disease        10. Atherosclerotic heart disease of     |              |
| native coronary artery without angina pectoris      Sep [facility] 2018       |              |
[facility] [place] [place] [facility]          Acute     |              |
| ischemic heart disease, unspecified        Long term (current) use of  |              |
| insulin        Dependence on renal dialysis        End stage renal     |              |
| disease        Type 2 diabetes mellitus with diabetic chronic kidney   |              |
| disease        Essential (primary) hypertension        Anemia in       |              |
| chronic kidney disease      2018 [facility] [place]. [place] |              |
|  Medical Surgical          0. Cough        1. Pneumonia due to         |              |
| Pseudomonas        2. End stage renal disease        3. Hypertensive   |              |
| heart and chronic kidney disease with heart failure and with stage 5   |              |
| chronic kidney disease, or end stage renal disease        4. Cachexia  |              |
|        5. Chronic obstructive pulmonary disease with acute lower       |              |
| respiratory infection        6. Type 2 diabetes mellitus with diabetic |              |
|  chronic kidney disease        7. Heart failure, unspecified        8. |              |
|  Hyperlipidemia, unspecified        9. Gastro-esophageal reflux        |              |
| disease without esophagitis      2018 [facility]       |              |
| [facility]. [place] Medical Surgical          0. Other chest pain        1.      |              |
| Gastro-esophageal reflux disease without esophagitis        2. End     |              |
| stage renal disease        3. Hypertensive heart and chronic kidney    |              |
| disease with heart failure and with stage 5 chronic kidney disease, or |              |
|  end stage renal disease        4. Chronic systolic (congestive) heart |              |
|  failure        5. Atherosclerotic heart disease of native coronary    |              |
| artery with other forms of angina pectoris        6. Type 2 diabetes   |              |
| mellitus with diabetic chronic kidney disease        7.                |              |
| Hyperlipidemia, unspecified        8. Major depressive disorder,       |              |
| single episode, unspecified        9. Chronic obstructive pulmonary    |              |
| disease, unspecified      Mar 6, 2018 [facility]     |              |
| [facility]          Heart Failure        Need for iv access  |              |
|        Type 2 diabetes mellitus with other specified complication      |              |
|      Long term (current) use of insulin        Paroxysmal atrial       |              |
| fibrillation        Anemia in chronic kidney disease                   |              |
| Atherosclerotic heart disease of native coronary artery without angina |              |
|  pectoris        Cardiomyopathy, unspecified        End stage renal    |              |
| disease        Other specified postprocedural states            Care   |              |
| Providers  Provider PRC Type Phone Fax Service Dates   HEADINGS, SANTIAGO, |              |
|  F.N.P. Nurse Practitioner: Family     Current      Marco Antonio Martinez     |              |
| /Care Coordinator [phone]    2019 -          |              |
| Current      [facility] Antonio Martinez Primary [facility] [phone]    2019 |              |
 
|  - Current      [facility]    [phone])    |              |
| [phone] Current      [facility] Primary     |              |
| Care     Current      MANOLO GONZALEZ Primary Care     Current            |              |
| [employer] Mental Health Provider [phone]     |              |
| Current      [employer] Case or Care Manager     Current      [facility]   |              |
| MIRTA Sr Case or Care Manager [phone]     |              |
| [phone] Current      Jairo Gutierrez MD Other     Current           |              |
| [place] Portal  This patient has registered at the [facility]  |              |
| [facility] Emergency Department   For more information visit:      |              |
| [email or web address] |              |
| [id number]   The above information is provided for the sole purpose of     |              |
| patient treatment. Use of this information beyond the terms of Data    |              |
| Sharing Memorandum of Understanding and License Agreement is           |              |
| prohibited. In certain cases not all visits may be represented.        |              |
| Consult the aforementioned facilities for additional information.      |              |
| 2019 [employer] - [place], [place] -      |              |
| [email or web address]                                         |              |
+------------------------------------------------------------------------+--------------+
 
 
 
+-------------------------------------------------------------------------------------------
--------------------------------------------------------------------------------------------
--------------------+
| Procedure Note                                                                            
                                                                                            
                    |
+-------------------------------------------------------------------------------------------
--------------------------------------------------------------------------------------------
--------------------+
|   Interface, Lab - 2019  4:00 PM PST  Formatting of this note may be different      
                                                                                            
                    |
| from the original.[id number] [id number] Met  Care Guidelines  10 in     
                                                                                            
                    |
| 12Security and SafetyNo recent Security Events currently on fileED Care Guidelines from   
                                                                                            
                    |
| [employer]  [employer] Updated: 18 10:16 AM  Other Information:Currently being      
                                                                                            
                    |
| seen by [facility] in [place] for mental health concerns.[phone] are            
                                                                                            
                    |
| guidelines and the provider should exercise clinical judgment when providing care.ED      
                                                                                            
                    |
| Care Guidelines from [facility] Updated: 17 10:44 AM  Care             
                                                                                            
                    |
| Coordination:This patient has been identified as having at leastEmergency Departments     
                                                                                            
                    |
| visits in the 12 months immediately preceding the date these guidelines were              
                                                                                            
                    |
| entered.Patient requires education on appropriate ED usage.Emphasize the importance of    
                                                                                            
                    |
 
| using outpatient medical services for the treatment of chronic conditions.Please contact  
                                                                                            
                    |
|  Community Health WorkerWillard at [phone] if patient is seen in ED.These are      
                                                                                            
                    |
| guidelines and the provider should exercise clinical judgment when providing care.These   
                                                                                            
                    |
| are guidelines and the provider should exercise clinical judgment when providing          
                                                                                            
                    |
| care.Prescription Drug Report (12 Mo.)PDMP query found no report.E.D. Visit Count (12     
                                                                                            
                    |
| mo.)Facility Visits Low Acuity [facility] 18 0 [facility] 2 0    
                                                                                            
                    |
| [facility] 4 0 Total 24 0 Note: Visits indicate total known visits.   
                                                                                            
                    |
| Medicaid Low Acuity Dx are the number of primary diagnoses on the Medicaid's Low Acuity   
                                                                                            
                    |
| dx list.  Recent Emergency Department Visit SummaryShowing 10 most recent visits out of   
                                                                                            
                    |
| 24 in the past 12 monthsDate Facility City State Type Diagnoses or Chief Complaint Feb    
                                                                                            
                    |
| 2019 [facility] [place] Emergency    Multiple Falls    Referral           
                                                                                            
                    |
| Circulatory Problem  2019 [facility]. [place] Emergency    ABD PAIN     
                                                                                            
                    |
|  Other chest pain  2019 [facility] [place] Emergency    Foot Pain     
                                                                                            
                    |
| 2019 [facility] [place] Emergency    Chest Pain    Nausea             
                                                                                            
                    |
| Shortness of Breath    Chest pain, unspecified    Elevated blood-pressure reading,        
                                                                                            
                    |
| without diagnosis of hypertension    Presence of aortocoronary bypass graft    Other      
                                                                                            
                    |
| specified postprocedural states  2019 [facility] [place]    
                                                                                            
                    |
|   CHEST PAIN    Abnormal levels of other serum enzymes    Personal history of other       
                                                                                            
                    |
| diseases of the circulatory system    Chest pain, unspecified  2019 [facility]  
                                                                                            
                    |
|  [facility] [place] Emergency    FISTULA BLEEDING    Hemorrhage due to vascular prosthetic  
                                                                                            
                    |
 
|  devices, implants and grafts, initial encounter  Dec 22, 2018 [facility]       
                                                                                            
                    |
| [place] Emergency    CHEST PAIN    Type 2 diabetes mellitus with hyperglycemia          
                                                                                            
                    |
| Chest pain, unspecified  2018 [facility] [place] [place] Emergency  Chief      
                                                                                            
                    |
| Complaint: SOB  2018 [facility] [place]    FALL             
                                                                                            
                    |
| Unspecified fall, initial encounter    Dependence on renal dialysis    End stage renal    
                                                                                            
                    |
| disease  2018 [facility] [place]    FALL    Essential       
                                                                                            
                    |
| (primary) hypertension    Type 2 diabetes mellitus with hyperglycemia    Type 2 diabetes  
                                                                                            
                    |
|  mellitus with unspecified complications    Unspecified fall, initial encounter           
                                                                                            
                    |
| Headache  Recent Inpatient Visit SummaryShowing 10 most recent visits out of 11 in the    
                                                                                            
                    |
| past 12 monthsDate Facility City State Type Diagnoses or Chief Complaint 2019     
                                                                                            
                    |
| Located within [facility] [place] Vascular Surgery    Foot Pain    End stage renal disease   
                                                                                            
                    |
|    Dependence on renal dialysis    Peripheral vascular disease, unspecified    Anemia in  
                                                                                            
                    |
|  chronic kidney disease    Other disorders of plasma-protein metabolism, not elsewhere    
                                                                                            
                    |
| classified    Other specified personal risk factors, not elsewhere classified  Dec 27,    
                                                                                            
                    |
|  [facility] [place] Recovery    Peripheral arterial disease,              
                                                                                            
                    |
| osteomyelitis left foot    Other acute osteomyelitis, left ankle and foot    Long term    
                                                                                            
                    |
| (current) use of antibiotics    End stage renal disease    Anemia in chronic kidney       
                                                                                            
                    |
| disease  Dec 5, 2018 [facility] [place] General Medicine    Peripheral        
                                                                                            
                    |
| vascular disease, unspecified    End stage renal disease    Dependence on renal dialysis  
                                                                                            
                    |
|     Long term (current) use of insulin    Other chronic osteomyelitis, left ankle and     
                                                                                            
                    |
 
| foot    Type 2 diabetes mellitus with diabetic chronic kidney disease    Essential        
                                                                                            
                    |
| (primary) hypertension  2018 [facility] [place] Inpatient    Upper    
                                                                                            
                    |
| GIB    Other chronic osteomyelitis, unspecified ankle and foot    End stage renal         
                                                                                            
                    |
| disease    Other specified personal risk factors, not elsewhere classified    Chronic     
                                                                                            
                    |
| systolic (congestive) heart failure    Anemia in chronic kidney disease    Other          
                                                                                            
                    |
| disorders of plasma-protein metabolism, not elsewhere classified    Moderate              
                                                                                            
                    |
| protein-calorie malnutrition    Other disorders of phosphorus metabolism  2018    
                                                                                            
                    |
| [place]. [place] Medical Surgical    1. Type 2 diabetes mellitus with other  
                                                                                            
                    |
|  specified complication    2. Urinary tract infection, site not specified    3.           
                                                                                            
                    |
| Unspecified protein-calorie malnutrition    4. Other chronic osteomyelitis, unspecified   
                                                                                            
                    |
| site    5. Hypertensive heart and chronic kidney disease with heart failure and with      
                                                                                            
                    |
| stage 5 chronic kidney disease, or end stage renal disease    6. Chronic diastolic        
                                                                                            
                    |
| (congestive) heart failure    7. Other osteomyelitis, ankle and foot    8. Type 2         
                                                                                            
                    |
| diabetes mellitus with foot ulcer    9. Atherosclerotic heart disease of native coronary  
                                                                                            
                    |
|  artery without angina pectoris    10. Chronic obstructive pulmonary disease,             
                                                                                            
                    |
| unspecified  2018 [facility] [place] [facility]    1. Benign      
                                                                                            
                    |
| paroxysmal vertigo, unspecified ear    2. End stage renal disease    3. Hypertensive      
                                                                                            
                    |
| chronic kidney disease with stage 5 chronic kidney disease or end stage renal disease     
                                                                                            
                    |
|  4. Urinary tract infection, site not specified    5. Hypertensive heart and chronic      
                                                                                            
                    |
| kidney disease with heart failure and with stage 5 chronic kidney disease, or end stage   
                                                                                            
                    |
 
| renal disease    6. Kidney transplant status    7. Unspecified systolic (congestive)      
                                                                                            
                    |
| heart failure    8. Syncope and collapse    9. Type 2 diabetes mellitus with diabetic     
                                                                                            
                    |
| chronic kidney disease    10. Atherosclerotic heart disease of native coronary artery     
                                                                                            
                    |
| without angina pectoris  Sep 15, 2018 [facility] JANEEN Vivar. [place] [facility]          
                                                                                            
                    |
| Surgical    Acute ischemic heart disease, unspecified    Long term (current) use of       
                                                                                            
                    |
| insulin    Dependence on renal dialysis    End stage renal disease    Type 2 diabetes     
                                                                                            
                    |
| mellitus with diabetic chronic kidney disease    Essential (primary) hypertension         
                                                                                            
                    |
| Anemia in chronic kidney disease  2018 [facility]. [place] [facility]      
                                                                                            
                    |
| Surgical    0. Cough    1. Pneumonia due to Pseudomonas    2. End stage renal disease     
                                                                                            
                    |
|  3. 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     
                                                                                            
                    |
| chronic kidney disease, or end stage renal disease    4. Cachexia    5. Chronic           
                                                                                            
                    |
| obstructive pulmonary disease with acute lower respiratory infection    6. Type 2         
                                                                                            
                    |
| diabetes mellitus with diabetic chronic kidney disease    7. Heart failure, unspecified   
                                                                                            
                    |
|    8. Hyperlipidemia, unspecified    9. Gastro-esophageal reflux disease without          
                                                                                            
                    |
| esophagitis  2018 [facility] [place] [facility]    0. Other       
                                                                                            
                    |
| chest pain    1. Gastro-esophageal reflux disease without esophagitis    2. End stage     
                                                                                            
                    |
| renal disease    3. Hypertensive heart and chronic kidney disease with heart failure and  
                                                                                            
                    |
|  with stage 5 chronic kidney disease, or end stage renal disease    4. Chronic systolic   
                                                                                            
                    |
| (congestive) heart failure    5. Atherosclerotic heart disease of native coronary artery  
                                                                                            
                    |
|  with other forms of angina pectoris    6. Type 2 diabetes mellitus with diabetic         
                                                                                            
                    |
 
| chronic kidney disease    7. Hyperlipidemia, unspecified    8. Major depressive           
                                                                                            
                    |
| disorder, single episode, unspecified    9. Chronic obstructive pulmonary disease,        
                                                                                            
                    |
| unspecified  Mar 6, 2018 [facility] [place] [facility]    Heart       
                                                                                            
                    |
| Failure    Need for iv access    Type 2 diabetes mellitus with other specified            
                                                                                            
                    |
| complication    Long term (current) use of insulin    Paroxysmal atrial fibrillation      
                                                                                            
                    |
| Anemia in chronic kidney disease    Atherosclerotic heart disease of native coronary      
                                                                                            
                    |
| artery without angina pectoris    Cardiomyopathy, unspecified    End stage renal disease  
                                                                                            
                    |
|     Other specified postprocedural states  Care ProvidersProvider PRC Type Phone Fax      
                                                                                            
                    |
| Service Dates HEADINGS, CARLOS ALBERTO HENDERSON. Nurse Practitioner: Family   Current  Michelle      
                                                                                            
                    |
| Marco Antonio /Care Coordinator [phone]  2019 - Current  [facility]  
                                                                                            
                    |
|  Primary Care [phone]  2019 - Current  [facility]        
                                                                                            
                    |
| Primary Care  [phone] Current  [facility] Primary Care   
                                                                                            
                    |
|   Current  MANOLO GONZALEZ Primary Care   Current  [facility] Provider      
                                                                                            
                    |
| [phone] Current  [facility] Case or Care Manager   Current  [facility]  
                                                                                            
                    |
|  MIRTA Sr Case or Care Manager [phone] Current      
                                                                                            
                    |
| Jairo Gutierrez MD Other   Current  [facility] PortalThis patient has registered at     
                                                                                            
                    |
| the [facility] Emergency Department For more information visit:       
                                                                                            
                    |
| [email or web address] The above    
                                                                                            
                    |
| information is provided for the sole purpose of patient treatment. Use of this            
                                                                                            
                    |
| information beyond the terms of Data Sharing Memorandum of Understanding and License      
                                                                                            
                    |
 
| Agreement is prohibited. In certain cases not all visits may be represented. Consult the  
                                                                                            
                    |
|  aforementioned facilities for additional information.  [employer]            
                                                                                            
                    |
| [email or web address] - [place], [place] - [email or web address]                  
                                                                                            
                    |
|   End stage renal disease                                                                 
                                                                                            
                    |
|   Anemia in chronic kidney disease                                                        
                                                                                            
                    |
|                                                                                           
                                                                                            
                    |
|Dec 5, 2018 [facility] General Medicine                                
                                                                                            
                    |
|  Peripheral vascular disease, unspecified                                                 
                                                                                            
                    |
|   End stage renal disease                                                                 
                                                                                            
                    |
|   Dependence on renal dialysis                                                            
                                                                                            
                    |
|   Long term (current) use of insulin                                                      
                                                                                            
                    |
|   Other chronic osteomyelitis, left ankle and foot                                        
                                                                                            
                    |
|   Type 2 diabetes mellitus with diabetic chronic kidney disease                           
                                                                                            
                    |
|   Essential (primary) hypertension                                                        
                                                                                            
                    |
|                                                                                           
                                                                                            
                    |
|2018 [facility] Inpatient                                      
                                                                                            
                    |
|  Upper GIB                                                                                
                                                                                            
                    |
|   Other chronic osteomyelitis, unspecified ankle and foot                                 
                                                                                            
                    |
|   End stage renal disease                                                                 
                                                                                            
                    |
|   Other specified personal risk factors, not elsewhere classified                         
                                                                                            
                    |
 
|   Chronic systolic (congestive) heart failure                                             
                                                                                            
                    |
|   Anemia in chronic kidney disease                                                        
                                                                                            
                    |
|   Other disorders of plasma-protein metabolism, not elsewhere classified                  
                                                                                            
                    |
|   Moderate protein-calorie malnutrition                                                   
                                                                                            
                    |
|   Other disorders of phosphorus metabolism                                                
                                                                                            
                    |
|                                                                                           
                                                                                            
                    |
|2018 [facility] Cox. [place] [facility]                                
                                                                                            
                    |
|  1. Type 2 diabetes mellitus with other specified complication                            
                                                                                            
                    |
|   2. Urinary tract infection, site not specified                                          
                                                                                            
                    |
|   3. Unspecified protein-calorie malnutrition                                             
                                                                                            
                    |
|   4. Other chronic osteomyelitis, unspecified site                                        
                                                                                            
                    |
|   5. 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
onic kidney disease, or end stage renal disease                                             
                    |
|   6. Chronic diastolic (congestive) heart failure                                         
                                                                                            
                    |
|   7. Other osteomyelitis, ankle and foot                                                  
                                                                                            
                    |
|   8. Type 2 diabetes mellitus with foot ulcer                                             
                                                                                            
                    |
|   9. Atherosclerotic heart disease of native coronary artery without angina pectoris      
                                                                                            
                    |
|   10. Chronic obstructive pulmonary disease, unspecified                                  
                                                                                            
                    |
|                                                                                           
                                                                                            
                    |
[facility] [place] [facility]                                 
                                                                                            
                    |
|  1. Benign paroxysmal vertigo, unspecified ear                                            
                                                                                            
                    |
 
|   2. End stage renal disease                                                              
                                                                                            
                    |
|   3. Hypertensive chronic kidney disease with stage 5 chronic kidney disease or end stage 
renal disease                                                                               
                    |
|   4. Urinary tract infection, site not specified                                          
                                                                                            
                    |
|   5. 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
onic kidney disease, or end stage renal disease                                             
                    |
|   6. Kidney transplant status                                                             
                                                                                            
                    |
|   7. Unspecified systolic (congestive) heart failure                                      
                                                                                            
                    |
|   8. Syncope and collapse                                                                 
                                                                                            
                    |
|   9. Type 2 diabetes mellitus with diabetic chronic kidney disease                        
                                                                                            
                    |
|   10. Atherosclerotic heart disease of native coronary artery without angina pectoris     
                                                                                            
                    |
|                                                                                           
                                                                                            
                    |
|Sep 15, 2018 [facility] JANE[facility] Vivar. [place] [facility]                           
                                                                                            
                    |
|  Acute ischemic heart disease, unspecified                                                
                                                                                            
                    |
|   Long term (current) use of insulin                                                      
                                                                                            
                    |
|   Dependence on renal dialysis                                                            
                                                                                            
                    |
|   End stage renal disease                                                                 
                                                                                            
                    |
|   Type 2 diabetes mellitus with diabetic chronic kidney disease                           
                                                                                            
                    |
|   Essential (primary) hypertension                                                        
                                                                                            
                    |
|   Anemia in chronic kidney disease                                                        
                                                                                            
                    |
|                                                                                           
                                                                                            
                    |
|2018 [facility] Akash Cox. [place] [facility]                                
                                                                                            
                    |
 
|  0. Cough                                                                                 
                                                                                            
                    |
|   1. Pneumonia due to Pseudomonas                                                         
                                                                                            
                    |
|   2. End stage renal disease                                                              
                                                                                            
                    |
|   3. 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
onic kidney disease, or end stage renal disease                                             
                    |
|   4. Cachexia                                                                             
                                                                                            
                    |
|   5. Chronic obstructive pulmonary disease with acute lower respiratory infection         
                                                                                            
                    |
|   6. Type 2 diabetes mellitus with diabetic chronic kidney disease                        
                                                                                            
                    |
|   7. Heart failure, unspecified                                                           
                                                                                            
                    |
|   8. Hyperlipidemia, unspecified                                                          
                                                                                            
                    |
|   9. Gastro-esophageal reflux disease without esophagitis                                 
                                                                                            
                    |
|                                                                                           
                                                                                            
                    |
|2018 [facility] Akash Cox. [place] Medical Surgical                                 
                                                                                            
                    |
|  0. Other chest pain                                                                      
                                                                                            
                    |
|   1. Gastro-esophageal reflux disease without esophagitis                                 
                                                                                            
                    |
|   2. End stage renal disease                                                              
                                                                                            
                    |
|   3. 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
onic kidney disease, or end stage renal disease                                             
                    |
|   4. Chronic systolic (congestive) heart failure                                          
                                                                                            
                    |
|   5. 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
ctoris                                                                                      
                    |
|   6. Type 2 diabetes mellitus with diabetic chronic kidney disease                        
                                                                                            
                    |
|   7. Hyperlipidemia, unspecified                                                          
                                                                                            
                    |
 
|   8. Major depressive disorder, single episode, unspecified                               
                                                                                            
                    |
|   9. Chronic obstructive pulmonary disease, unspecified                                   
                                                                                            
                    |
|                                                                                           
                                                                                            
                    |
|Mar 6, 2018 [facility] JANEEN Ramsay. [place] Cardiology                              
                                                                                            
                    |
|  Heart Failure                                                                            
                                                                                            
                    |
|   Need for iv access                                                                      
                                                                                            
                    |
|   Type 2 diabetes mellitus with other specified complication                              
                                                                                            
                    |
|   Long term (current) use of insulin                                                      
                                                                                            
                    |
|   Paroxysmal atrial fibrillation                                                          
                                                                                            
                    |
|   Anemia in chronic kidney disease                                                        
                                                                                            
                    |
|   Atherosclerotic heart disease of native coronary artery without angina pectoris         
                                                                                            
                    |
|   Cardiomyopathy, unspecified                                                             
                                                                                            
                    |
|   End stage renal disease                                                                 
                                                                                            
                    |
|   Other specified postprocedural states                                                   
                                                                                            
                    |
|                                                                                           
                                                                                            
                    |
|                                                                                           
                                                                                            
                    |
|                                                                                           
                                                                                            
                    |
|Care Providers                                                                             
                                                                                            
                    |
|Provider PRC Type Phone Fax Service Dates                                                  
                                                                                            
                    |
|HEADINGS, ELZA HENDERSON Nurse Practitioner: Family   Current                                
                                                                                            
                    |
 
|Mar[facility] Antonio Martinez /Care Coordinator [phone]  2019 - Current         
                                                                                            
                    |
|[facility] Primary Care [phone]  2019 - Current                          
                                                                                            
                    |
|[facility] Primary Care  [phone] Current                         
                                                                                            
                    |
|[facility] Primary Care   Current                                
                                                                                            
                    |
|[facility] GONZALEZ Primary Care   Current                                                        
                                                                                            
                    |
|[employer] Mental Health Provider [phone] Current                 
                                                                                            
                    |
|[facility] Case or Care Manager   Current                                                   
                                                                                            
                    |
|MIRTA Goel Case or Care Manager [phone] Current
                                                                                            
                    |
|Jairo Gutierrez MD Other   Current                                                       
                                                                                            
                    |
|                                                                                           
                                                                                            
                    |
|Cobook Portal                                                                          
                                                                                            
                    |
|This patient has registered at the [facility] Emergency Department     
                                                                                            
                    |
|For more information visit: [email or web address]
[email or web address]                                                                               
                    |
|The above information is provided for the sole purpose of patient treatment. Use of this in
formation beyond the terms of Data Sharing Memorandum of Understanding and License Agreement
 is prohibited. In  |
|certain cases not all visits may be represented. Consult the aforementioned facilities for 
additional information.                                                                     
                    |
|2019 [employer] - [place], [place] - [email or web address]                                                                                       
                    |
+-------------------------------------------------------------------------------------------
--------------------------------------------------------------------------------------------
--------------------+
 
 
 
+-------------------+---------+--------------------+--------------+
| Performing        | Address | City/State/Zipcode | Phone Number |
| Organization      |         |                    |              |
+-------------------+---------+--------------------+--------------+
|   ED INFORMATION  |         |                    |              |
| EXCHANGE          |         |                    |              |
 
+-------------------+---------+--------------------+--------------+
 in this encounter
 
 Visit Diagnoses
 Not on filein this encounter

## 2019-04-19 NOTE — XMS
Encounter Summary
  Created on: 2019
 
 Cherie Villalobos
 External Reference #: [id number]
 : 48
 Sex: Female
 
 Demographics
 
 
+-----------------------+--------------------------+
| Address               | [address]               |
|                       | [place]  [address] |
+-----------------------+--------------------------+
| Home Phone            | [phone]          |
+-----------------------+--------------------------+
| Preferred Language    | Unknown                  |
+-----------------------+--------------------------+
| Marital Status        |                   |
+-----------------------+--------------------------+
| Muslim Affiliation | 1013                     |
+-----------------------+--------------------------+
| Race                  | Unknown                  |
+-----------------------+--------------------------+
| Ethnic Group          | Unknown                  |
+-----------------------+--------------------------+
 
 
 Author
 
 
+--------------+-----------------------+
| Author       | [employer] |
+--------------+-----------------------+
| Organization | [facility] [facility] |
+--------------+-----------------------+
| Address      | Unknown               |
+--------------+-----------------------+
| Phone        | Unavailable           |
+--------------+-----------------------+
 
 
 
 Support
 
 
+---------------+--------------+---------------------+-----------------+
| Name          | Relationship | Address             | Phone           |
+---------------+--------------+---------------------+-----------------+
| Anoop Villalobos | ECON         | [place], | [phone] |
|               |              |  [place]  [address]     |                 |
+---------------+--------------+---------------------+-----------------+
| Jennifer Dickson | ECON         | [place]       | [phone] |
|               |              | [id number]               |                 |
+---------------+--------------+---------------------+-----------------+
 
 
 
 
 Care Team Providers
 
 
+-----------------------+------+-----------------+
| Care Team Member Name | Role | Phone           |
+-----------------------+------+-----------------+
| Ivy Couch DO      | PCP  | [phone] |
+-----------------------+------+-----------------+
 
 
 
 Encounter Details
 
 
+--------+------------+----------------------+-----------+-------------+
| Date   | Type       | Department           | Care Team | Description |
+--------+------------+----------------------+-----------+-------------+
| / | Procedure  |   [facility]    |           |             |
| 2019   | Pass       | [place] [address]   |           |             |
|        |            | Floor [place] |           |             |
|        |            |   [address]     |           |             |
|        |            | [place] [address]   |           |             |
|        |            | [phone]         |           |             |
+--------+------------+----------------------+-----------+-------------+
 
 
 
 Social History
 
 
+---------------+------------+-----------+--------+------------------+
| Tobacco Use   | Types      | Packs/Day | Years  | Date             |
|               |            |           | Used   |                  |
+---------------+------------+-----------+--------+------------------+
| Former Smoker | Cigarettes | 1         | 30     | Quit: 2004 |
+---------------+------------+-----------+--------+------------------+
 
 
 
+---------------------+---+---+---+
| Smokeless Tobacco:  |   |   |   |
| Never Used          |   |   |   |
+---------------------+---+---+---+
 
 
 
+------------------------------+
| Comments: quite smoking  |
+------------------------------+
 
 
 
+-------------+-----------+---------+----------+
| Alcohol Use | Drinks/We | oz/Week | Comments |
|             | ek        |         |          |
+-------------+-----------+---------+----------+
| No          |           |         |          |
+-------------+-----------+---------+----------+
 
 
 
 
+------------------+---------------+
| Sex Assigned at  | Date Recorded |
| Birth            |               |
+------------------+---------------+
| Not on file      |               |
+------------------+---------------+
 as of this encounter
 
 Plan of Treatment
 
 
+--------+----------+-----------------+----------------------+-------------+
| Date   | Type     | Specialty       | Care Team            | Description |
+--------+----------+-----------------+----------------------+-------------+
| / | Initial  | General Surgery |   Сергей Medeiros, |             |
| 2019   | consult  |                 |  MD [address]  |             |
|        |          |                 |  [place] [address]  |             |
|        |          |                 |  [phone]        |             |
|        |          |                 | [phone] (Fax)   |             |
+--------+----------+-----------------+----------------------+-------------+
 as of this encounter
 
 Visit Diagnoses
 Not on filein this encounter

## 2019-04-19 NOTE — XMS
Encounter Summary
  Created on: 2019
 
 Cherie Villalobos
 External Reference #: [id number]
 : 48
 Sex: Female
 
 Demographics
 
 
+-----------------------+--------------------------+
| Address               | [address]               |
|                       | [place]  [address] |
+-----------------------+--------------------------+
| Home Phone            | [phone]          |
+-----------------------+--------------------------+
| Preferred Language    | Unknown                  |
+-----------------------+--------------------------+
| Marital Status        |                   |
+-----------------------+--------------------------+
| Scientologist Affiliation | [id number]                     |
+-----------------------+--------------------------+
| Race                  | Unknown                  |
+-----------------------+--------------------------+
| Ethnic Group          | Unknown                  |
+-----------------------+--------------------------+
 
 
 Author
 
 
+--------------+-----------------------+
| Author       | Sherry Welltheon |
+--------------+-----------------------+
| Organization | [facility] |
+--------------+-----------------------+
| Address      | Unknown               |
+--------------+-----------------------+
| Phone        | Unavailable           |
+--------------+-----------------------+
 
 
 
 Support
 
 
+---------------+--------------+---------------------+-----------------+
| Name          | Relationship | Address             | Phone           |
+---------------+--------------+---------------------+-----------------+
| Anoop Villalobos | ECON         | [place], | [phone] |
|               |              |  [place]  [address]     |                 |
+---------------+--------------+---------------------+-----------------+
| Jennifer Dickson | ECON         | [place]       | [phone] |
|               |              | [address]               |                 |
+---------------+--------------+---------------------+-----------------+
 
 
 
 
 Care Team Providers
 
 
+-----------------------+------+-----------------+
| Care Team Member Name | Role | Phone           |
+-----------------------+------+-----------------+
| Ivy Couch DO      | PCP  | [phone] |
+-----------------------+------+-----------------+
 
 
 
 Reason for Visit
 
 
+-----------+----------------------------------+
| Reason    | Comments                         |
+-----------+----------------------------------+
| Follow-up | PAD (peripheral artery disease)  |
+-----------+----------------------------------+
 
 
 
 Encounter Details
 
 
+--------+---------+----------------------+----------------------+----------------------+
| Date   | Type    | Department           | Care Team            | Description          |
+--------+---------+----------------------+----------------------+----------------------+
| / | Office  |   [facility]      |   Kirt Mclaughlin MD     | Steal syndrome as    |
| 2019   | Visit   | Vascular Surgery     | [address]  | complication of      |
|        |         | [address] |  [place] [address]  | dialysis access,     |
|        |         |  E  [place], [place]     |  [phone]        | sequela (Primary     |
|        |         | [address]           | [phone] (Fax)   | Dx); ESRD (end stage |
|        |         | [phone]         |                      |  renal disease)      |
|        |         |                      |                      | (HCC); PAD           |
|        |         |                      |                      | (peripheral artery   |
|        |         |                      |                      | disease) ([facility])       |
+--------+---------+----------------------+----------------------+----------------------+
 
 
 
 Social History
 
 
+---------------+------------+-----------+--------+------------------+
| Tobacco Use   | Types      | Packs/Day | Years  | Date             |
|               |            |           | Used   |                  |
+---------------+------------+-----------+--------+------------------+
| Former Smoker | Cigarettes | 1         | 30     | Quit: 2004 |
+---------------+------------+-----------+--------+------------------+
 
 
 
+---------------------+---+---+---+
| Smokeless Tobacco:  |   |   |   |
| Never Used          |   |   |   |
+---------------------+---+---+---+
 
 
 
 
+------------------------------+
| Comments: quite smoking  |
+------------------------------+
 
 
 
+-------------+-----------+---------+----------+
| Alcohol Use | Drinks/We | oz/Week | Comments |
|             | ek        |         |          |
+-------------+-----------+---------+----------+
| No          |           |         |          |
+-------------+-----------+---------+----------+
 
 
 
+------------------+---------------+
| Sex Assigned at  | Date Recorded |
| Birth            |               |
+------------------+---------------+
| Not on file      |               |
+------------------+---------------+
 as of this encounter
 
 Last Filed Vital Signs
 
 
+-------------------+---------+-------------------------+
| Vital Sign        | Reading | Time Taken              |
+-------------------+---------+-------------------------+
| Blood Pressure    | 130/60  | 2019  2:36 PM PST |
+-------------------+---------+-------------------------+
| Pulse             | 77      | 2019  2:36 PM PST |
+-------------------+---------+-------------------------+
| Temperature       | -       | -                       |
+-------------------+---------+-------------------------+
| Respiratory Rate  | -       | -                       |
+-------------------+---------+-------------------------+
| Oxygen Saturation | 100%    | 2019  2:36 PM PST |
+-------------------+---------+-------------------------+
| Inhaled Oxygen    | -       | -                       |
| Concentration     |         |                         |
+-------------------+---------+-------------------------+
| Weight            | -       | -                       |
+-------------------+---------+-------------------------+
| Height            | -       | -                       |
+-------------------+---------+-------------------------+
| Body Mass Index   | -       | -                       |
+-------------------+---------+-------------------------+
 in this encounter
 
 Progress Notes
 Kirt Mclaughlin MD - 2019  3:00 PM PSTFormatting of this note may be different from the o
aleksandar.
 Ms. Villalobos is a pleasant 68 y.o. female with PMH significant for acute MI, anemia, atrial f
ibrillation, COPD, CAD, diabetes, ESRD, hyperlipidemia, and hypertension, who presents today
 for wound follow up. Patient states she was in the hospital recently, she had an XR of her 
right great toe which showed a fracture. The area is healing. She also complains of pain and
 some discoloration of her left 2nd finger. Patient reports she has been having continued pa
in in her right toe, and at the site of her left forefoot amputation. Patient adds that she 
 
has not been able to follow up with her primary care physician as she is only in the office 
on , and the weather has made it difficult to get into town. 
 
 Results: X-ray foot right 3+ views 
 Impression 
 Linear lucency/irregularity at the distal 1st phalanx, may represent 
 minimally displaced fracture. 
 Signed by: Oleksandr Lemus 
 Sign Date/Time: 2019 10:20 AM 
 
 Past Medical History 
 Diagnosis Date 
   Acute MI ([facility])  
  with stenting x 1 
   Anemia associated with chronic renal failure 2016 
   Atrial fibrillation ([facility])  
   Chronic kidney disease  
   Congestive heart failure ([facility])  
   COPD (chronic obstructive pulmonary disease) ([facility])  
   COPD (chronic obstructive pulmonary disease) ([facility]) 2018 
   Coronary artery disease  
  stent placements: 3 total 
   Depression  
   Diabetes other 
  abstracted 
   Diabetes mellitus, type 2 ([facility])  
   ESRD on peritoneal dialysis ([facility])  
   GERD (gastroesophageal reflux disease)  
   Hemodialysis patient ([facility]) 10/2016 
  Stopped peritoneal and restarted hemodialysis 
   Hemorrhage of gastrointestinal tract, unspecified 2017 
  low GI, rectal bleeding 
   High blood pressure other 
  abstracted 
   High cholesterol other 
  abstracted 
   Hyperlipidemia  
   Hypertension  
   Hyponatremia 2017 
   Myocardial infarction ([facility]) 2017 
   Other chronic pain  
  feet,  
   Pain of left hip joint 2016 
  due to hip fracture and replacement 
   Partial small bowel obstruction ([facility]) 2017 
  resolved 
   Renal failure  
  Stage 5.   Fistula in the JAN - not on dialysis yet. 
   Unspecified visual disturbance  
  glasses 
 
 Past Surgical History 
 Procedure Laterality Date 
   AV FISTULA PLACEMENT   
  left upper arm AV fistula - failed 
   AV FISTULA REPAIR N/A 10/25/2016 
  Procedure: AV FISTULA - GRAFT REPAIR/REVISION;  Surgeon: Kirt Mclaughlin MD;  Location: [facility]
IN OR;  Service: Vascular;  Laterality: N/A;  Superficialization and revision of left arm fi
stula 
   CARDIAC CATHETERIZATION  2017 
 
   CARPAL TUNNEL RELEASE Bilateral other 
  abstracted 
   CATARACT EXTRACTION Bilateral  
   CATHETER REMOVAL N/A 2016 
  Procedure: DIALYSIS CATHETER - REMOVAL;  Surgeon: Kirt Mclaughlin MD;  Location: [facility] OR; 
 Service: Vascular;  Laterality: N/A;  PD cath removal 
   CHOLECYSTECTOMY  other 
  abstracted 
   COLONOSCOPY   
   CORONARY ARTERY BYPASS GRAFT   
   CORONARY STENT PLACEMENT  2017 
  Drug Elutin stents to OM, 1 stent to prox. LAD 
   EYE SURGERY   
   FOOT AMPUTATION Left 2018 
  Procedure: FOREFOOT - AMPUTATION;  Surgeon: Srinivasan Jordan DPM;  Location: [facility] MAIN OR;
  Service: Podiatry;  Laterality: Left; 
   HARDWARE PRESENT   
  stent placements x 3, Left hip replacement, vascular stents 2 in left leg 
   HIP ARTHROPLASTY Left 2016 
  Procedure: HIP - ARTHROPLASTY(ALLISON);  Surgeon: Uriel Roa MD;  Location: [facility] MAIN 
OR;  Service: Orthopedics;  Laterality: Left; 
   HYSTERECTOMY   
  complete 
   PACEMAKER INSERTION   
  [employer] pacemaker/defib 
   PERITONEAL CATHETER INSERTION  8/15/2013 
   PERITONEAL CATHETER INSERTION N/A 2016 
  Procedure: LAPAROSCOPIC - PERITONEAL DIALYSIS CATH INSERTION;  Surgeon: Kirt Mclaughlin MD;  Lo
cation: [facility] MAIN OR;  Service: Vascular;  Laterality: N/A;  Removal of old catheter 
   SHOULDER SURGERY Right  
  frozen shoulder 
   UNLISTED PROCEDURE ARTHROSCOPY   
  total Left hip 
   vascular stents Left 2017 
  leg (2 stents) 
   VASCULAR SURGERY   
 
 Social History 
 Substance Use Topics 
   Smoking status: Former Smoker 
   Packs/day: 1.00 
   Years: 30.00 
   Types: Cigarettes 
   Quit date: 2004 
   Smokeless tobacco: Never Used 
    Comment: quite smoking  
   Alcohol use No 
 
 Family History 
 Problem Relation Age of Onset 
   High cholesterol Father  
   Hypertension Father  
   Diabetes Paternal Grandmother  
   Maljoann hypertherm Neg Hx  
   Kidney disease Neg Hx  
 
 Current Outpatient Prescriptions on File Prior to Visit 
 Medication Sig Dispense Refill 
   albuterol (PROVENTIL HFA;VENTOLIN HFA) 108 (90 Base) MCG/ACT inhaler Inhale 2 puffs int
o the lungs every 4 (four) hours as needed for Wheezing.   
 
   apixaban (ELIQUIS) 2.5 MG tablet Take 1 tablet by mouth 2 (two) times daily. 60 tablet 
0 
   atorvastatin (LIPITOR) 80 MG tablet Take 80 mg by mouth nightly.   
   carvedilol (COREG) 12.5 MG tablet Take 1 tablet by mouth 2 (two) times daily with meals
. 60 tablet 0 
   esomeprazole (NEXIUM) 40 MG capsule Take 1 capsule by mouth every day   
   HYDROcodone-acetaminophen (NORCO) 5-325 MG per tablet Take 1 tablet by mouth every 4 (f
our) hours as needed. 30 tablet 0 
   insulin aspart (NOVOLOG) 100 UNIT/ML injection Inject  into the skin 3 (three) times da
melina before meals. Sliding scale   
   insulin degludec (TRESIBA) 100 UNIT/ML injection Inject 21 units every night   
   isosorbide mononitrate (IMDUR) 60 MG 24 hr tablet Take 1 tablet by mouth daily. 30 tabl
et 1 
   LORazepam (ATIVAN) 1 MG tablet Take 0.5 tablets by mouth every 8 (eight) hours as neede
d for Anxiety. Patient states she has the 1mg tablets at home and that they are scored and s
he will split them in half   
   losartan (COZAAR) 100 MG tablet Take 100 mg by mouth daily.   
   nitroGLYCERIN (NITROSTAT) 0.4 MG SL tablet Place 1 tablet under the tongue every 5 (fiv
e) minutes as needed for Chest pain. 30 tablet 0 
   nortriptyline (PAMELOR) 25 MG capsule Take 25-75 mg by mouth See Admin Instructions. Ta
kes 25 mg by mouth every morning and 75 mg every night   
   ondansetron (ZOFRAN-ODT) 4 MG disintegrating tablet Take 4 mg by mouth every 8 (eight) 
hours as needed.   
   oxybutynin (DITROPAN) 5 MG tablet Take 7.5 mg by mouth 2 (two) times daily.   
   prochlorperazine 5 MG tablet TAKE ONE TABLET BY MOUTH TWICE DAILY AS NEEDED FOR NAUSEA 
60 tablet 11 
   rOPINIRole (REQUIP) 0.25 MG tablet Take 0.75 mg by mouth nightly.   
   TRINTELLIX 20 MG TABS Take 20 mg by mouth every evening.   
 
 No current facility-administered medications on file prior to visit.  
 
 Allergies 
 Allergen Reactions 
   Digitoxin Other (See Comments) 
   Toxic, patient states her eyes were also affected "she seen yellow everywhere" 
   Morphine Mental Changes, Other (See Comments) and Anxiety 
   "makes me feel jittery"
 Other reaction(s): MAKES HER "CRAZY"
 Hallucinations
 Make her crazy/ "funny feeling in my head"
 Has used hydromorphone in-house 
   Penicillin G Hives 
   Penicillins Rash and Hives 
   Other reaction(s): RASH
 Tolerates cephalosporins 
   Quinapril Hcl Anaphylaxis 
   Quinapril Hcl Angioedema 
   Facial Edema 
   Digoxin And Related Other (See Comments) 
   toxic 
   Metaxalone Other (See Comments) 
   Pantoprazole Sodium Nausea and Vomiting 
   Quinapril Hcl Edema 
   Facial Edema 
   Sudafed [Pseudoephedrine Hcl] Rash 
 
 Comprehensive ROS performed and pertinent items described in the HPI. 
 
 Vitals:reviewed
 CONSTITUTIONAL:  Conversant, well developed, NAD
 
 EYES: Anicteric sclerae, no lid drag, no proptosis
 RESP: Normal effort, regular, even, unlabored rate
 CV: No peripheral edema, rate regular
 SKIN: Pink, warm, dry without rash/lesion
 MS: ROM not limited, no digital cyanosis, normal gait
 NEURO: Cranial nerves II-XII grossly intact, A&O times 3
 PSYCH: appropriate affect, speech and tone, judgement and insight intact
 Vascular:  Left AV fistula with a strong palpable thrill.  Toe wound healing well.  
 
 Discussed with the patient that her toe appears to be healing and there is no need for vasc
ular intervention on her right lower extremity. Educated the patient that her finger pain is
 likely the result of her fistula diverting too much blood from her hand. I advised the sylvester
ent to do her best to prevent any injury to her hand, in order to prevent ulcerations. The p
atient will need a fistulogram in the near future, which she can call the office to schedule
. All questions and concerns addressed. Patient will follow up after a fistulogram. Patient 
understands and is agreeable. 
 
 Attending Note: Documentation assistance provided by Steven Mike (David). Information r
ecorded by the [email or web address] has been reviewed and validated by me. I agree with its contents.
 
 Signed by: David Chavis 19, 3:07 PM
 
 Kirt Mclaughlin MD
 
 in this encounter
 
 Plan of Treatment
 
 
+--------+----------+-----------------+----------------------+-------------+
| Date   | Type     | Specialty       | Care Team            | Description |
+--------+----------+-----------------+----------------------+-------------+
| / | Initial  | General Surgery |   Сергей Medeiros, |             |
| 2019   | consult  |                 |  MD [address]  |             |
|        |          |                 |  [place] [address]  |             |
|        |          |                 |  [phone]        |             |
|        |          |                 | [phone] (Fax)   |             |
+--------+----------+-----------------+----------------------+-------------+
 as of this encounter
 
 Visit Diagnoses
 
 
+------------------------------------------------------------------------+
| Diagnosis                                                              |
+------------------------------------------------------------------------+
|   Steal syndrome as complication of dialysis access, sequela - Primary |
+------------------------------------------------------------------------+
|   ESRD (end stage renal disease) (HCC)                                 |
+------------------------------------------------------------------------+
|   End stage renal disease                                              |
+------------------------------------------------------------------------+
|   PAD (peripheral artery disease) (HCC)                                |
+------------------------------------------------------------------------+
|   Unspecified disorders of arteries and arterioles                     |
+------------------------------------------------------------------------+

## 2019-04-19 NOTE — XMS
Encounter Summary
  Created on: 2019
 
 Cherie Villalobos
 External Reference #: [id number]
 : 48
 Sex: Female
 
 Demographics
 
 
+-----------------------+--------------------------+
| Address               | [address]               |
|                       | [place]  [address] |
+-----------------------+--------------------------+
| Home Phone            | [phone]          |
+-----------------------+--------------------------+
| Preferred Language    | Unknown                  |
+-----------------------+--------------------------+
| Marital Status        |                   |
+-----------------------+--------------------------+
| Jewish Affiliation | [id number]                     |
+-----------------------+--------------------------+
| Race                  | Unknown                  |
+-----------------------+--------------------------+
| Ethnic Group          | Unknown                  |
+-----------------------+--------------------------+
 
 
 Author
 
 
+--------------+-----------------------+
| Author       | [employer] |
+--------------+-----------------------+
| Organization | [facility] |
+--------------+-----------------------+
| Address      | Unknown               |
+--------------+-----------------------+
| Phone        | Unavailable           |
+--------------+-----------------------+
 
 
 
 Support
 
 
+---------------+--------------+---------------------+-----------------+
| Name          | Relationship | Address             | Phone           |
+---------------+--------------+---------------------+-----------------+
| Anoop Villalobos | ECON         | [place], | [phone] |
|               |              |  [place]  [address]     |                 |
+---------------+--------------+---------------------+-----------------+
| Jennifer Dickson | ECON         | [place]       | [phone] |
|               |              | [phone]               |                 |
+---------------+--------------+---------------------+-----------------+
 
 
 
 
 Care Team Providers
 
 
+-----------------------+------+-----------------+
| Care Team Member Name | Role | Phone           |
+-----------------------+------+-----------------+
| Ivy Couch DO      | PCP  | [phone] |
+-----------------------+------+-----------------+
 
 
 
 Reason for Visit
 
 
+-----------+---------------------------+
| Reason    | Comments                  |
+-----------+---------------------------+
| Follow-up | 6 wk f/u possible pd cath |
+-----------+---------------------------+
 
 
 
 Encounter Details
 
 
+--------+---------+----------------------+----------------------+----------------------+
| Date   | Type    | Department           | Care Team            | Description          |
+--------+---------+----------------------+----------------------+----------------------+
| / | Office  |   [facility]      |   Kirt Mclaughlin MD     | PAD (peripheral      |
| 2019   | Visit   | Vascular Surgery     | [address]  | artery disease)      |
|        |         | [address] |  [place] [address]  | ([facility]) (Primary Dx);  |
|        |         |  E  [place], [place]     |  [phone]        | ESRD (end stage      |
|        |         | [address]           | [phone] (Fax)   | renal disease) ([facility]) |
|        |         | [phone]         |                      |                      |
+--------+---------+----------------------+----------------------+----------------------+
 
 
 
 Social History
 
 
+---------------+------------+-----------+--------+------------------+
| Tobacco Use   | Types      | Packs/Day | Years  | Date             |
|               |            |           | Used   |                  |
+---------------+------------+-----------+--------+------------------+
| Former Smoker | Cigarettes | 1         | 30     | Quit: 2004 |
+---------------+------------+-----------+--------+------------------+
 
 
 
+---------------------+---+---+---+
| Smokeless Tobacco:  |   |   |   |
| Never Used          |   |   |   |
+---------------------+---+---+---+
 
 
 
+------------------------------+
| Comments: quite smoking  |
 
+------------------------------+
 
 
 
+-------------+-----------+---------+----------+
| Alcohol Use | Drinks/We | oz/Week | Comments |
|             | ek        |         |          |
+-------------+-----------+---------+----------+
| No          |           |         |          |
+-------------+-----------+---------+----------+
 
 
 
+------------------+---------------+
| Sex Assigned at  | Date Recorded |
| Birth            |               |
+------------------+---------------+
| Not on file      |               |
+------------------+---------------+
 as of this encounter
 
 Last Filed Vital Signs
 
 
+-------------------+---------+-------------------------+
| Vital Sign        | Reading | Time Taken              |
+-------------------+---------+-------------------------+
| Blood Pressure    | 155/77  | 2019  1:14 PM PST |
+-------------------+---------+-------------------------+
| Pulse             | 89      | 2019  1:14 PM PST |
+-------------------+---------+-------------------------+
| Temperature       | -       | -                       |
+-------------------+---------+-------------------------+
| Respiratory Rate  | -       | -                       |
+-------------------+---------+-------------------------+
| Oxygen Saturation | 100%    | 2019  1:14 PM PST |
+-------------------+---------+-------------------------+
| Inhaled Oxygen    | -       | -                       |
| Concentration     |         |                         |
+-------------------+---------+-------------------------+
| Weight            | -       | -                       |
+-------------------+---------+-------------------------+
| Height            | -       | -                       |
+-------------------+---------+-------------------------+
| Body Mass Index   | -       | -                       |
+-------------------+---------+-------------------------+
 in this encounter
 
 Progress Notes
 Kirt Mclaughlin MD - 2019  2:45 PM PSTFormatting of this note may be different from the o
aleksandar.
 Ms. Villalobos is a pleasant 68 y.o. female with PMH significant for acute MI, anemia, Afib, KD
, COPD, CAD, diabetes, ESRD on HD, hyperlipidemia, hypertension who is referred to me for RL
E ulcerations and PD catheter consideration. Patient has ulcerations on her right second toe
 and is seen by both Infectious Disease and Podiatry for this. Patient had recent left foot 
amputation via Dr. Jordan in the end of December. Patient describes that she has ulceration
s on her hands that she had been told has been vascular in etiology. Patient is followed by 
Dr. Elliott, Infectious Disease, and is receiving IV antibiotics on days she has dialysis. Pratima
ent is also considering peritoneal dialysis catheter but still hesitant about this. Patient 
is seen by Dr. Matias, podiatry. 
 
 
 US Bilateral Lower Extremity Arterial
 
 Impression 
 1. Right leg shows biphasic inflow with a greater than 50% stenosis at 
 the origin of the superficial femoral artery (137-309). Biphasic 
 outflow. 
 2. Two vessel runoff to the right foot. 
 Signed by: Eugenio Hoffman 
 Sign Date/Time: 2019 3:11 PM 
 
 Past Medical History 
 Diagnosis Date 
   Acute MI ([facility])  
  with stenting x 1 
   Anemia associated with chronic renal failure 2016 
   Atrial fibrillation (HCC)  
   Chronic kidney disease  
   Congestive heart failure (HCC)  
   COPD (chronic obstructive pulmonary disease) (HCC)  
   COPD (chronic obstructive pulmonary disease) ([facility]) 2018 
   Coronary artery disease  
  stent placements: 3 total 
   Depression  
   Diabetes other 
  abstracted 
   Diabetes mellitus, type 2 (HCC)  
   ESRD on peritoneal dialysis (HCC)  
   GERD (gastroesophageal reflux disease)  
   Hemodialysis patient ([facility]) 10/2016 
  Stopped peritoneal and restarted hemodialysis 
   Hemorrhage of gastrointestinal tract, unspecified 2017 
  low GI, rectal bleeding 
   High blood pressure other 
  abstracted 
   High cholesterol other 
  abstracted 
   Hyperlipidemia  
   Hypertension  
   Hyponatremia 2017 
   Myocardial infarction (HCC) 2017 
   Other chronic pain  
  feet,  
   Pain of left hip joint 2016 
  due to hip fracture and replacement 
   Partial small bowel obstruction ([facility]) 2017 
  resolved 
   Renal failure  
  Stage 5.   Fistula in the JAN - not on dialysis yet. 
   Unspecified visual disturbance  
  glasses 
 
 Past Surgical History 
 Procedure Laterality Date 
   AV FISTULA PLACEMENT   
  left upper arm AV fistula - failed 
   AV FISTULA REPAIR N/A 10/25/2016 
  Procedure: AV FISTULA - GRAFT REPAIR/REVISION;  Surgeon: Kirt Mclaughlin MD;  Location: [facility]
IN OR;  Service: Vascular;  Laterality: N/A;  Superficialization and revision of left arm fi
stula 
 
   CARDIAC CATHETERIZATION  2017 
   CARPAL TUNNEL RELEASE Bilateral other 
  abstracted 
   CATARACT EXTRACTION Bilateral 2007 
   CATHETER REMOVAL N/A 2016 
  Procedure: DIALYSIS CATHETER - REMOVAL;  Surgeon: Kirt Mclaughlin MD;  Location: [facility] OR; 
 Service: Vascular;  Laterality: N/A;  PD cath removal 
   CHOLECYSTECTOMY  other 
  abstracted 
   COLONOSCOPY   
   CORONARY ARTERY BYPASS GRAFT   
   CORONARY STENT PLACEMENT  2017 
  Drug Elutin stents to OM, 1 stent to prox. LAD 
   EYE SURGERY   
   FOOT AMPUTATION Left 2018 
  Procedure: FOREFOOT - AMPUTATION;  Surgeon: Srinivasan Jordan DPM;  Location: [facility] MAIN OR;
  Service: Podiatry;  Laterality: Left; 
   HARDWARE PRESENT   
  stent placements x 3, Left hip replacement, vascular stents 2 in left leg 
   HIP ARTHROPLASTY Left 2016 
  Procedure: HIP - ARTHROPLASTY(ALLISON);  Surgeon: Uriel Roa MD;  Location: [facility] MAIN 
OR;  Service: Orthopedics;  Laterality: Left; 
   HYSTERECTOMY  1998 
  complete 
   PACEMAKER INSERTION   
  [employer] pacemaker/defib 
   PERITONEAL CATHETER INSERTION  8/15/2013 
   PERITONEAL CATHETER INSERTION N/A 2016 
  Procedure: LAPAROSCOPIC - PERITONEAL DIALYSIS CATH INSERTION;  Surgeon: Kirt Mclaughlin MD;  Lo
cation: [facility] MAIN OR;  Service: Vascular;  Laterality: N/A;  Removal of old catheter 
   SHOULDER SURGERY Right  
  frozen shoulder 
   UNLISTED PROCEDURE ARTHROSCOPY   
  total Left hip 
   vascular stents Left 2017 
  leg (2 stents) 
   VASCULAR SURGERY   
 
 Social History 
 Substance Use Topics 
   Smoking status: Former Smoker 
   Packs/day: 1.00 
   Years: 30.00 
   Types: Cigarettes 
   Quit date: 2004 
   Smokeless tobacco: Never Used 
    Comment: quite smoking  
   Alcohol use No 
 
 Family History 
 Problem Relation Age of Onset 
   High cholesterol Father  
   Hypertension Father  
   Diabetes Paternal Grandmother  
   Malig hypertherm Neg Hx  
   Kidney disease Neg Hx  
 
 Current Outpatient Prescriptions on File Prior to Visit 
 Medication Sig Dispense Refill 
   albuterol (PROVENTIL HFA;VENTOLIN HFA) 108 (90 Base) MCG/ACT inhaler Inhale 2 puffs int
 
o the lungs every 4 (four) hours as needed for Wheezing.   
   albuterol (VENTOLIN HFA) 108 (90 Base) MCG/ACT inhaler Ventolin HFA 90 mcg/actuation ae
rosol inhaler
  Inhale 2 puffs every 4 hours by inhalation route as needed.   
   apixaban (ELIQUIS) 2.5 MG tablet Take 1 tablet by mouth 2 (two) times daily. 60 tablet 
0 
   atorvastatin (LIPITOR) 40 MG tablet Take 1 tablet by mouth nightly. 30 tablet 0 
   bisacodyl (DULCOLAX) 10 MG suppository Place 10 mg rectally.   
   calcium acetate (PHOSLO) 667 MG capsule 667 mg 3 (three) times daily with meals.   
   carvedilol (COREG) 12.5 MG tablet Take 1 tablet by mouth 2 (two) times daily with meals
. 60 tablet 0 
   cefTAZidime (FORTAZ) 2 g injection Inject 2 g into the muscle After Hemodialysis (see a
dmin instructions) for 7 days. 1 each  
   Cholecalciferol (VITAMIN D3) 5000 UNITS CAPS Take 5,000 capsules by mouth every other d
ay.   
   clopidogrel (PLAVIX) 75 MG tablet Take 1 tablet by mouth daily. 30 tablet 11 
   esomeprazole (NEXIUM) 20 MG capsule NEXIUM(ESOMEPRAZOLE MAGNESIUM) 20 MG CAPSULE.DR
 Dose: 1 CAP   
   HYDROcodone-acetaminophen (NORCO) 5-325 MG per tablet Take 1 tablet by mouth every 4 (f
our) hours as needed. 30 tablet 0 
   insulin aspart (NOVOLOG) 100 UNIT/ML injection Inject  into the skin 3 (three) times da
melina before meals.   
   insulin degludec (TRESIBA) 100 UNIT/ML injection Tresiba Flextouch U-100(INSULIN DEGLUD
EC) 100 UNIT/1 ML INSULN.PEN
 Dose: 20 UNIT at night   
   isosorbide mononitrate (IMDUR) 60 MG 24 hr tablet Take 1 tablet by mouth daily. 30 tabl
et 1 
   loperamide (IMODIUM) 2 MG capsule 2 mg as needed.   
   LORazepam (ATIVAN) 1 MG tablet Take 1 tablet by mouth every 8 (eight) hours as needed f
or Anxiety. 30 tablet 0 
   Magnesium Cl-Calcium Carbonate (SLOW-MAG) 71.5-119 MG TBEC Take 1 tablet by mouth every
 other day.   
   nitroGLYCERIN (NITROSTAT) 0.4 MG SL tablet Place 1 tablet under the tongue every 5 (fiv
e) minutes as needed for Chest pain. 30 tablet 0 
   nortriptyline (PAMELOR) 25 MG capsule Take 25 mg by mouth 3 (three) times daily. Takes 
25 mg in am , and 75 mg at night   
   ondansetron (ZOFRAN-ODT) 4 MG disintegrating tablet Take 4 mg by mouth as needed.   
   oxybutynin (DITROPAN) 5 MG tablet Take 2.5 mg by mouth 2 (two) times daily.   
   prochlorperazine 5 MG tablet TAKE ONE TABLET BY MOUTH TWICE DAILY AS NEEDED FOR NAUSEA 
60 tablet 11 
   ranitidine (ZANTAC) 150 MG tablet ranitidine 150 mg tablet
  Take 1 tablet twice a day by oral route.   
   rOPINIRole (REQUIP) 0.25 MG tablet Take 0.75 mg by mouth nightly.   
   vancomycin (VANCOCIN) IVPB Inject 750 mg into the vein After Hemodialysis (see admin in
structions) for 7 days. Dilute in appropriate volume of IV fluid and give by slow IV infusio
n.  0 
   Vortioxetine HBr 10 MG TABS 10 mg nightly.   
 
 No current facility-administered medications on file prior to visit.  
 
 Allergies 
 Allergen Reactions 
   Quinapril Hcl Anaphylaxis 
   Digoxin And Related Other (See Comments) 
   toxic 
   Metaxalone Other (See Comments) 
   Morphine Mental Changes 
   Make her crazy/ "funny feeling in my head"
 Has used hydromorphone in-house 
   Pantoprazole Sodium Nausea and Vomiting 
 
   Penicillins Rash 
   Quinapril Hcl Edema 
   Facial Edema 
   Sudafed [Pseudoephedrine Hcl] Rash 
 
 Comprehensive ROS performed and pertinent items described in the HPI. 
 
 Vitals:reviewed
 CONSTITUTIONAL:  Conversant, well developed, NAD
 EYES: Anicteric sclerae, no lid drag, no proptosis
 RESP: Normal effort, regular, even, unlabored rate
 CV: No peripheral edema, rate regular
 SKIN: Pink, warm, dry without rash/lesion
 MS: ROM not limited, no digital cyanosis, normal gait
 NEURO: Cranial nerves II-XII grossly intact, A&O times 3
 PSYCH: appropriate affect, speech and tone, judgement and insight intact
 Vascular: strong biphasic signals in right foot.  
 
 Discussed ultrasound results from 19 with the patient, which show mildly decreased blo
od flow in her right leg. The patient's nonhealing right foot ulcerations are likely due to 
infection and not due to vascular issues. Discussed with the patient that the symptoms in he
r hands are due to vascular access steal syndrome. Recommended against PD catheter due to kurtis rodriguez's past issues concerning adhesions and recommended that the patient stay with HD as lo
ng as she is still tolerating it well. Discussed with the patient that the ulceration in her
 right foot still shows signs of infection and patient was advised to monitor it closely to 
prevent amputation.  If there is still poor healing after infection control, we may consider
 repeating arteriogram at that time. All questions and concerns addressed. Patient will foll
ow up in 1 month. Patient understands and is agreeable. 
 
 Kirt Mclaughlin MD
 
 Attending Note: Documentation assistance provided by Tito Clark (David). Information tammy
rded by the [email or web address] has been reviewed and validated by me. I agree with its contents.
 
 Signed by: David Combs 19, 1:38 PM
 in this encounter
 
 Plan of Treatment
 
 
+--------+----------+-----------------+----------------------+-------------+
| Date   | Type     | Specialty       | Care Team            | Description |
+--------+----------+-----------------+----------------------+-------------+
| / | Initial  | General Surgery |   Сергей Medeiros, |             |
|    | consult  |                 |  MD [address]  |             |
|        |          |                 |  [place] [address]  |             |
|        |          |                 |  [phone]        |             |
|        |          |                 | [phone] (Fax)   |             |
+--------+----------+-----------------+----------------------+-------------+
 as of this encounter
 
 Visit Diagnoses
 
 
+----------------------------------------------------+
| Diagnosis                                          |
+----------------------------------------------------+
|   PAD (peripheral artery disease) (HCC) - Primary  |
+----------------------------------------------------+
|   Unspecified disorders of arteries and arterioles |
 
+----------------------------------------------------+
|   ESRD (end stage renal disease) (HCC)             |
+----------------------------------------------------+
|   End stage renal disease                          |
+----------------------------------------------------+

## 2019-04-19 NOTE — XMS
PreManage Notification: DANILO JO MRN:[id number]
 
Security Information
 
Security Events
No recent Security Events currently on file
 
 
 
CRITERIA MET
------------
- Group Notification
- 6 ED Visits in 6 Months
- [facility] - Has Care Guidelines
- [facility] - 3 Facilities in 90 Days
- PDMP
- [facility] - 2 Visits in 30 Days
 
 
CARE PROVIDERS
-------------------------------------------------------------------------------------
Marco Antonio Martinez     /Care Coordinator     01/04/2019-Current
 
PHONE: [phone]
-------------------------------------------------------------------------------------
Marco Antonio Martinez     Primary Care     01/04/2019-Current
 
PHONE: [phone]
-------------------------------------------------------------------------------------
RUTHY DELEON           Primary Care     [facility]
 
PHONE: Unknown
-------------------------------------------------------------------------------------
LEGERIC GOOD              Primary Care     Current
[facility]
 
PHONE: Unknown
-------------------------------------------------------------------------------------
MANOLO GONZALEZ     Primary Care     Current
 
PHONE: Unknown
-------------------------------------------------------------------------------------
[employer]     Mental Health Provider     Current
 
PHONE: [phone]
-------------------------------------------------------------------------------------
orsagrario     Case or Care Manager     Current
 
PHONE: Unknown
-------------------------------------------------------------------------------------
Willard Doyle         Case or Care Manager     Current
[facility]
 
PHONE: [phone]
-------------------------------------------------------------------------------------
Jairo Gutierrez MD     Current
 
 
PHONE: Unknown
-------------------------------------------------------------------------------------
 
Guidelines Source: [employer]
Guidelines Date: 03/19/2019
 
Care Coordination:
    Patient requires education on appropriate ED usage.\T\nbsp; Emphasize the 
    importance of using outpatient medical services for the treatment of chronic
    conditions. Please contact Community Health WorkerWillard at [phone] if
    patient is seen in ED.
-------------------------------------------------------------------------
Additional care guidelines exist for the following facilities:    
[facility] ( 09/12/2018 )
 
TISHA VISIT COUNT (12 MO.)
-------------------------------------------------------------------------------------
[facility]
 
[facility]
 
[address] [place] [address] Alexsander CHRISTIANSON
-------------------------------------------------------------------------------------
TOTAL 27
-------------------------------------------------------------------------------------
NOTE: Visits indicate total known visits.
 
ED/UCC VISIT TRACKING (12 MO.)
-------------------------------------------------------------------------------------
04/19/2019 12:46
[facility] [place]
 
TYPE: Emergency
 
COMPLAINT:
- ALT LOC
-------------------------------------------------------------------------------------
04/18/2019 07:38
[facility] [place]
 
TYPE: Emergency
 
DIAGNOSES:
- Contusion of left knee, initial encounter
- Unspecified fall, initial encounter
- L KNEE PAIN
-------------------------------------------------------------------------------------
04/09/2019 12:56
[facility] [place]
 
TYPE: Emergency
 
COMPLAINT:
- LEFT ARM WOUND
 
DIAGNOSES:
- Hemorrhage due to vascular prosthetic devices, implants and grafts, initial
encounter
 
- Long term (current) use of aspirin
- Other long term (current) drug therapy
- Type 2 diabetes mellitus without complications
- Long term (current) use of insulin
- Allergy status to other drugs, medicaments and biological substances status
- Allergy status to narcotic agent status
- Heart failure, unspecified
- Hemorrhage due to vascular prosthetic devices, implants and grafts, initial
encounter
- Chronic obstructive pulmonary disease, unspecified
-------------------------------------------------------------------------------------
04/03/2019 19:27
[facility] [place]
 
TYPE: Emergency
 
COMPLAINT:
- CHEST PAIN
 
DIAGNOSES:
- Chronic kidney disease, unspecified
- Dependence on renal dialysis
- Other specified abnormal findings of blood chemistry
- Long term (current) use of insulin
- Allergy status to narcotic agent status
- Chronic obstructive pulmonary disease, unspecified
- Allergy status to other drugs, medicaments and biological substances status
- Long term (current) use of aspirin
- Chest pain, unspecified
- Type 2 diabetes mellitus without complications
- Other long term (current) drug therapy
- Other chest pain
- Personal history of nicotine dependence
-------------------------------------------------------------------------------------
03/26/2019 11:56
[facility]
 
TYPE: Emergency
 
COMPLAINT:
- CHEST PAIN
-------------------------------------------------------------------------------------
03/17/2019 13:25
[facility] [place]
 
TYPE: Emergency
 
DIAGNOSES:
- Contusion of other part of head, initial encounter
- FALL
- Fall on same level, unspecified, initial encounter
- Hyperkalemia
-------------------------------------------------------------------------------------
03/11/2019 15:03
[facility] [place]
 
TYPE: Emergency
 
DIAGNOSES:
- Weakness
 
- Acute cystitis without hematuria
- weakness
-------------------------------------------------------------------------------------
02/14/2019 15:19
[facility]
 
TYPE: Emergency
 
DIAGNOSES:
- Unspecified fall, initial encounter
- Displaced fracture of distal phalanx of right great toe, initial encounter for
closed fracture
- Cellulitis of right toe
- Other fatigue
- Skin Complaint
- Weakness
- Referral
- Unspecified place in unspecified non-institutional (private) residence as the place
of occurrence of the external cause
-------------------------------------------------------------------------------------
02/13/2019 15:56
[facility]
 
TYPE: Emergency
 
DIAGNOSES:
- Multiple Falls
- Referral
- Circulatory Problem
-------------------------------------------------------------------------------------
02/09/2019 22:15
[facility] [place]
 
TYPE: Emergency
 
DIAGNOSES:
- Other chest pain
- ABD PAIN
-------------------------------------------------------------------------------------
[facility]2019 13:01
[facility]     [place]
 
TYPE: Emergency
 
DIAGNOSES:
- Foot Pain
-------------------------------------------------------------------------------------
01/16/2019 09:44
[facility]
 
TYPE: Emergency
 
DIAGNOSES:
- Chest Pain
- Elevated blood-pressure reading, without diagnosis of hypertension
- Presence of aortocoronary bypass graft
- Nausea
- Chest pain, unspecified
- Other specified postprocedural states
- Shortness of Breath
 
-------------------------------------------------------------------------------------
01/16/2019 05:43
[facility] [place]
 
TYPE: Emergency
 
DIAGNOSES:
- CHEST PAIN
- Abnormal levels of other serum enzymes
- Personal history of other diseases of the circulatory system
- Chest pain, unspecified
-------------------------------------------------------------------------------------
01/08/2019 14:45
[facility] [place]
 
TYPE: Emergency
 
DIAGNOSES:
- Hemorrhage due to vascular prosthetic devices, implants and grafts, initial
encounter
- FISTULA BLEEDING
-------------------------------------------------------------------------------------
12/22/2018 17:20
[facility] [place]
 
TYPE: Emergency
 
DIAGNOSES:
- Type 2 diabetes mellitus with hyperglycemia
- Chest pain, unspecified
- CHEST PAIN
-------------------------------------------------------------------------------------
11/27/2018 18:05
[facility]
 
TYPE: Emergency
 
COMPLAINT:
- SOB
-------------------------------------------------------------------------------------
11/27/2018 07:45
[facility] [place]
 
TYPE: Emergency
 
DIAGNOSES:
- Unspecified fall, initial encounter
- FALL
- Dependence on renal dialysis
- End stage renal disease
-------------------------------------------------------------------------------------
11/19/2018 14:59
[facility] [facility] [place]
 
TYPE: Emergency
 
DIAGNOSES:
- Essential (primary) hypertension
- Type 2 diabetes mellitus with hyperglycemia
- Type 2 diabetes mellitus with unspecified complications
 
- FALL
- Unspecified fall, initial encounter
- Headache
-------------------------------------------------------------------------------------
11/17/2018 14:57
[facility]
 
[facility]
 
COMPLAINT:
- BACK PAIN
- DORSALGIA UNSPECIFIED
- FREQUENCY OF MICTURITION
- PAINFUL MICTURITION UNSPECIFIED
 
DIAGNOSES:
0. Frequency of micturition
1. Urinary tract infection, site not specified
5. Dorsalgia, unspecified
6. Type 2 diabetes mellitus with hyperglycemia
7. Type 2 diabetes mellitus with diabetic chronic kidney disease
8. End stage renal disease
9. Dependence on renal dialysis
10. Shortness of breath
11. Long term (current) use of anticoagulants
12. Long term (current) use of aspirin
13. Other long term (current) drug therapy
14. Personal history of nicotine dependence
15. Presence of cardiac pacemaker
16. Presence of aortocoronary bypass graft
-------------------------------------------------------------------------------------
10/04/2018 14:42
[facility] [place]
 
TYPE: Emergency
 
COMPLAINT:
- CHEST PAIN
-------------------------------------------------------------------------------------
Plus 7 More Visits
-------------------------------------------------------------------------------------
 
 
INPATIENT VISIT TRACKING (12 MO.)
-------------------------------------------------------------------------------------
03/26/2019 11:56
[facility]
 
TYPE: Medical Surgical
 
COMPLAINT:
- ACUTE CP, CHRONIC RENAL FAILURE, ANEMIA
 
DIAGNOSES:
0. Pleural effusion, not elsewhere classified
1. 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
chronic kidney disease, or end stage renal disease
2. End stage renal disease
3. Acute on chronic diastolic (congestive) heart failure
4. Acute kidney failure, unspecified
 
5. Chronic kidney disease, unspecified
6. Anemia, unspecified
7. Type 2 diabetes mellitus with diabetic chronic kidney disease
8. Atherosclerotic heart disease of native coronary artery without angina pectoris
9. Type 2 diabetes mellitus with diabetic neuropathy, unspecified
10. Unspecified atrial fibrillation
11. Restless legs syndrome
12. Unspecified urinary incontinence
13. Patient's noncompliance with dietary regimen
14. Presence of cardiac pacemaker
15. Presence of aortocoronary bypass graft
16. Dependence on renal dialysis
17. Patient's noncompliance with renal dialysis
18. History of falling
19. Long term (current) use of insulin
-------------------------------------------------------------------------------------
01/23/2019 13:01
[facility]
 
TYPE: Vascular Surgery
 
DIAGNOSES:
- End stage renal disease
- Anemia in chronic kidney disease
- Dependence on renal dialysis
- Other disorders of plasma-protein metabolism, not elsewhere classified
- Other specified personal risk factors, not elsewhere classified
- Peripheral vascular disease, unspecified
- Foot Pain
-------------------------------------------------------------------------------------
12/27/2018 09:45
[facility]
 
TYPE: Recovery
 
DIAGNOSES:
- End stage renal disease
- Peripheral arterial disease, osteomyelitis left foot
- Other acute osteomyelitis, left ankle and foot
- Long term (current) use of antibiotics
- Anemia in chronic kidney disease
-------------------------------------------------------------------------------------
12/05/2018 07:52
[facility]
 
TYPE: General Medicine
 
DIAGNOSES:
- End stage renal disease
- Peripheral vascular disease, unspecified
- Dependence on renal dialysis
- Long term (current) use of insulin
- Other chronic osteomyelitis, left ankle and foot
- Type 2 diabetes mellitus with diabetic chronic kidney disease
- Essential (primary) hypertension
-------------------------------------------------------------------------------------
11/29/2018 11:38
[facility]
 
TYPE: Inpatient
 
 
DIAGNOSES:
- Upper GIB
- Other specified personal risk factors, not elsewhere classified
- Chronic systolic (congestive) heart failure
- Other disorders of phosphorus metabolism
- Anemia in chronic kidney disease
- Other disorders of plasma-protein metabolism, not elsewhere classified
- Moderate protein-calorie malnutrition
- End stage renal disease
- Other chronic osteomyelitis, unspecified ankle and foot
-------------------------------------------------------------------------------------
11/27/2018 18:05
[facility]
 
TYPE: Medical Surgical
 
COMPLAINT:
- SOB
 
DIAGNOSES:
0. Type 2 diabetes mellitus with foot ulcer
1. Type 2 diabetes mellitus with other specified complication
2. Urinary tract infection, site not specified
3. Unspecified protein-calorie malnutrition
4. Other chronic osteomyelitis, unspecified site
5. 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
chronic kidney disease, or end stage renal disease
6. Chronic diastolic (congestive) heart failure
7. Other osteomyelitis, ankle and foot
8. Type 2 diabetes mellitus with foot ulcer
9. Atherosclerotic heart disease of native coronary artery without angina pectoris
10. Chronic obstructive pulmonary disease, unspecified
11. Other malaise
12. Type 2 diabetes mellitus with hyperglycemia
13. Type 2 diabetes mellitus with diabetic chronic kidney disease
14. Type 2 diabetes mellitus with diabetic peripheral angiopathy without gangrene
15. Unspecified atrial fibrillation
16. Type 2 diabetes mellitus with diabetic neuropathy, unspecified
17. End stage renal disease
18. Dependence on renal dialysis
19. Long term (current) use of insulin
20. Allergy status to penicillin
21. Allergy status to other drugs, medicaments and biological substances status
22. Personal history of nicotine dependence
23. Anemia in chronic kidney disease
24. Other disorders of phosphorus metabolism
25. Other disorders of plasma-protein metabolism, not elsewhere classified
26. Gout, unspecified
27. Gastro-esophageal reflux disease without esophagitis
28. Old myocardial infarction
29. Presence of aortocoronary bypass graft
-------------------------------------------------------------------------------------
11/05/2018 14:39
[facility]
 
TYPE: Medical Surgical
 
COMPLAINT:
- RML PNEUMONIA SYNCOPE COLLAPSE
 
 
DIAGNOSES:
0. Syncope and collapse
1. Benign paroxysmal vertigo, unspecified ear
2. End stage renal disease
3. Hypertensive chronic kidney disease with stage 5 chronic kidney disease or end
stage renal disease
4. Urinary tract infection, site not specified
5. 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
chronic kidney disease, or end stage renal disease
6. Kidney transplant status
7. Unspecified systolic (congestive) heart failure
8. Syncope and collapse
9. Type 2 diabetes mellitus with diabetic chronic kidney disease
10. Atherosclerotic heart disease of native coronary artery without angina pectoris
11. Nicotine dependence, cigarettes, uncomplicated
12. Anemia, unspecified
13. Other disorders of plasma-protein metabolism, not elsewhere classified
14. Other disorders of phosphorus metabolism
15. Type 2 diabetes mellitus with diabetic polyneuropathy
16. Other specified bacterial agents as the cause of diseases classified elsewhere
17. Paroxysmal atrial fibrillation
18. Presence of aortocoronary bypass graft
19. Long term (current) use of insulin
-------------------------------------------------------------------------------------
09/15/2018 00:38
[facility]
 
TYPE: Medical [facility]
 
DIAGNOSES:
- Long term (current) use of insulin
- Dependence on renal dialysis
- End stage renal disease
- Type 2 diabetes mellitus with diabetic chronic kidney disease
- Essential (primary) hypertension
- Acute ischemic heart disease, unspecified
- Anemia in chronic kidney disease
-------------------------------------------------------------------------------------
04/27/2018 18:23
[facility]
 
TYPE: Medical Surgical
 
COMPLAINT:
- SOB, UTI, RENAL FAILURE
 
DIAGNOSES:
0. Cough
1. Pneumonia due to Pseudomonas
2. End stage renal disease
3. 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
chronic kidney disease, or end stage renal disease
4. Cachexia
5. Chronic obstructive pulmonary disease with acute lower respiratory infection
6. Type 2 diabetes mellitus with diabetic chronic kidney disease
7. Heart failure, unspecified
8. Hyperlipidemia, unspecified
9. Gastro-esophageal reflux disease without esophagitis
10. Atherosclerotic heart disease of native coronary artery without angina pectoris
 
11. Major depressive disorder, single episode, unspecified
12. Anxiety disorder, unspecified
13. Type 2 diabetes mellitus with diabetic polyneuropathy
14. Unspecified atrial fibrillation
15. Pneumonia due to Methicillin resistant Staphylococcus aureus
16. Nosocomial condition
17. BACTERIURIA
17. Abnormal coagulation profile
18. Abnormal coagulation profile
18. Adverse effect of anticoagulants, initial encounter
19. Anemia in chronic kidney disease
19. Adverse effect of anticoagulants, initial encounter
20. Anemia in chronic kidney disease
20. Other disorders of plasma-protein metabolism, not elsewhere classified
21. Exposure to other specified factors, initial encounter
21. Other disorders of plasma-protein metabolism, not elsewhere classified
22. Exposure to other specified factors, initial encounter
22. Activity, unspecified
23. Activity, unspecified
23. Other abnormal findings in urine
24. Presence of aortocoronary bypass graft
25. Presence of prosthetic heart valve
26. Long term (current) use of insulin
27. Dependence on renal dialysis
28. Unspecified external cause status
29. Old myocardial infarction
30. Acquired absence of both cervix and uterus
31. Acquired absence of other specified parts of digestive tract
32. Allergy status to narcotic agent status
33. Allergy status to penicillin
34. Allergy status to other drugs, medicaments and biological substances status
35. Long term (current) use of anticoagulants
36. Long term (current) use of aspirin
37. Body mass index (BMI) 23.0-23.9, adult
-------------------------------------------------------------------------------------
 
[email or web address]

## 2019-04-19 NOTE — XMS
Encounter Summary
  Created on: 2019
 
 Cherie Villalobos
 External Reference #: [id number]
 : 48
 Sex: Female
 
 Demographics
 
 
+-----------------------+--------------------------+
| Address               | [address]               |
|                       | [place]  [address] |
+-----------------------+--------------------------+
| Home Phone            | [phone]          |
+-----------------------+--------------------------+
| Preferred Language    | Unknown                  |
+-----------------------+--------------------------+
| Marital Status        |                   |
+-----------------------+--------------------------+
| Uatsdin Affiliation | 1013                     |
+-----------------------+--------------------------+
| Race                  | Unknown                  |
+-----------------------+--------------------------+
| Ethnic Group          | Unknown                  |
+-----------------------+--------------------------+
 
 
 Author
 
 
+--------------+-----------------------+
| Author       | Sherry [employer] |
+--------------+-----------------------+
| Organization | [facility] |
+--------------+-----------------------+
| Address      | Unknown               |
+--------------+-----------------------+
| Phone        | Unavailable           |
+--------------+-----------------------+
 
 
 
 Support
 
 
+---------------+--------------+---------------------+-----------------+
| Name          | Relationship | Address             | Phone           |
+---------------+--------------+---------------------+-----------------+
| Anoop Villalobos | ECON         | [place], | [phone] |
|               |              |  [place]  [address]     |                 |
+---------------+--------------+---------------------+-----------------+
| Jennifer Dickson | ECON         | [place]       | [phone] |
|               |              | [address]               |                 |
+---------------+--------------+---------------------+-----------------+
 
 
 
 
 Care Team Providers
 
 
+-----------------------+------+-----------------+
| Care Team Member Name | Role | Phone           |
+-----------------------+------+-----------------+
| Ivy Couch DO      | PCP  | [phone] |
+-----------------------+------+-----------------+
 
 
 
 Reason for Visit
 
 
+-------------------+----------+
| Reason            | Comments |
+-------------------+----------+
| Medication Refill |          |
+-------------------+----------+
 
 
 
 Encounter Details
 
 
+--------+--------+----------------------+----------------------+-------------+
| Date   | Type   | Department           | Care Team            | Description |
+--------+--------+----------------------+----------------------+-------------+
| / | Refill |   [facility]      |   João Asher MD  |             |
|    |        | [address]        |  [address]   |             |
|        |        | [address]   | [address]  [place], [place]    |             |
|        |        | [place] [address]   | [address]  [phone]  |             |
|        |        | [phone]         |  [phone] (Fax)  |             |
+--------+--------+----------------------+----------------------+-------------+
 
 
 
 Social History
 
 
+---------------+------------+-----------+--------+------------------+
| Tobacco Use   | Types      | Packs/Day | Years  | Date             |
|               |            |           | Used   |                  |
+---------------+------------+-----------+--------+------------------+
| Former Smoker | Cigarettes | 1         | 30     | Quit: 2004 |
+---------------+------------+-----------+--------+------------------+
 
 
 
+---------------------+---+---+---+
| Smokeless Tobacco:  |   |   |   |
| Never Used          |   |   |   |
+---------------------+---+---+---+
 
 
 
+------------------------------+
| Comments: quite smoking 2004 |
+------------------------------+
 
 
 
 
+-------------+-----------+---------+----------+
| Alcohol Use | Drinks/We | oz/Week | Comments |
|             | ek        |         |          |
+-------------+-----------+---------+----------+
| No          |           |         |          |
+-------------+-----------+---------+----------+
 
 
 
+------------------+---------------+
| Sex Assigned at  | Date Recorded |
| Birth            |               |
+------------------+---------------+
| Not on file      |               |
+------------------+---------------+
 as of this encounter
 
 Plan of Treatment
 
 
+--------+----------+-----------------+----------------------+-------------+
| Date   | Type     | Specialty       | Care Team            | Description |
+--------+----------+-----------------+----------------------+-------------+
| / | Initial  | General Surgery |   Сергей Medeiros, |             |
|    | consult  |                 |  [address]  |             |
|        |          |                 |  [place] [address]  |             |
|        |          |                 |  [phone]        |             |
|        |          |                 | [phone] (Fax)   |             |
+--------+----------+-----------------+----------------------+-------------+
 as of this encounter
 
 Visit Diagnoses
 Not on filein this encounter

## 2019-04-19 NOTE — XMS
Encounter Summary
  Created on: 2019
 
 Cherie Villalobos
 External Reference #: [id number]
 : 48
 Sex: Female
 
 Demographics
 
 
+-----------------------+--------------------------+
| Address               | [address]               |
|                       | [place]  [address] |
+-----------------------+--------------------------+
| Home Phone            | [phone]          |
+-----------------------+--------------------------+
| Preferred Language    | Unknown                  |
+-----------------------+--------------------------+
| Marital Status        |                   |
+-----------------------+--------------------------+
| Restorationism Affiliation | 1013                     |
+-----------------------+--------------------------+
| Race                  | Unknown                  |
+-----------------------+--------------------------+
| Ethnic Group          | Unknown                  |
+-----------------------+--------------------------+
 
 
 Author
 
 
+--------------+-----------------------+
| Author       | Sherry [employer] |
+--------------+-----------------------+
| Organization | [facility] |
+--------------+-----------------------+
| Address      | Unknown               |
+--------------+-----------------------+
| Phone        | Unavailable           |
+--------------+-----------------------+
 
 
 
 Support
 
 
+---------------+--------------+---------------------+-----------------+
| Name          | Relationship | Address             | Phone           |
+---------------+--------------+---------------------+-----------------+
| Anoop Villalobos | ECON         | [place], | [phone] |
|               |              |  [place]  [address]     |                 |
+---------------+--------------+---------------------+-----------------+
| Jennifer Dickson | ECON         | [place]       | [phone] |
|               |              | [address]               |                 |
+---------------+--------------+---------------------+-----------------+
 
 
 
 
 Care Team Providers
 
 
+-----------------------+------+-----------------+
| Care Team Member Name | Role | Phone           |
+-----------------------+------+-----------------+
| Ivy Couch DO      | PCP  | [phone] |
+-----------------------+------+-----------------+
 
 
 
 Reason for Visit
 
 
+--------+------------------------------------------+
| Reason | Comments                                 |
+--------+------------------------------------------+
| Other  | Requesting status on form for shoe order |
+--------+------------------------------------------+
 
 
 
 Encounter Details
 
 
+--------+-----------+----------------------+----------------------+---------------------+
| Date   | Type      | Department           | Care Team            | Description         |
+--------+-----------+----------------------+----------------------+---------------------+
| / | Telephone |   [facility] |   Srinivasan Jordan,  | Other (Requesting   |
| 2019   |           |  [address]  [address]      | DPM  [address], | status on form for  |
|        |           | [address]   |  [address]  [place],  | shoe order)         |
|        |           | [place], [place]         | [place] [address]             |                     |
|        |           | [address]           | [phone]         |                     |
|        |           | [phone]         | [phone] (Fax)   |                     |
+--------+-----------+----------------------+----------------------+---------------------+
 
 
 
 Social History
 
 
+---------------+------------+-----------+--------+------------------+
| Tobacco Use   | Types      | Packs/Day | Years  | Date             |
|               |            |           | Used   |                  |
+---------------+------------+-----------+--------+------------------+
| Former Smoker | Cigarettes | 1         | 30     | Quit: 2004 |
+---------------+------------+-----------+--------+------------------+
 
 
 
+---------------------+---+---+---+
| Smokeless Tobacco:  |   |   |   |
| Never Used          |   |   |   |
+---------------------+---+---+---+
 
 
 
+------------------------------+
| Comments: quite smoking  |
 
+------------------------------+
 
 
 
+-------------+-----------+---------+----------+
| Alcohol Use | Drinks/We | oz/Week | Comments |
|             | ek        |         |          |
+-------------+-----------+---------+----------+
| No          |           |         |          |
+-------------+-----------+---------+----------+
 
 
 
+------------------+---------------+
| Sex Assigned at  | Date Recorded |
| Birth            |               |
+------------------+---------------+
| Not on file      |               |
+------------------+---------------+
 as of this encounter
 
 Plan of Treatment
 
 
+--------+----------+-----------------+----------------------+-------------+
| Date   | Type     | Specialty       | Care Team            | Description |
+--------+----------+-----------------+----------------------+-------------+
| / | Initial  | General Surgery |   Сергей Medeiros, |             |
|    | consult  |                 |  [address]  |             |
|        |          |                 |  [place] [address]  |             |
|        |          |                 |  [phone]        |             |
|        |          |                 | [phone] (Fax)   |             |
+--------+----------+-----------------+----------------------+-------------+
 as of this encounter
 
 Visit Diagnoses
 Not on filein this encounter

## 2019-04-19 NOTE — XMS
Encounter Summary
  Created on: 2019
 
 Cherie Villalobos
 External Reference #: [id number]
 : 48
 Sex: Female
 
 Demographics
 
 
+-----------------------+--------------------------+
| Address               | [address]               |
|                       | [place]  [address] |
+-----------------------+--------------------------+
| Home Phone            | [phone]          |
+-----------------------+--------------------------+
| Preferred Language    | Unknown                  |
+-----------------------+--------------------------+
| Marital Status        |                   |
+-----------------------+--------------------------+
| Pentecostal Affiliation | 1013                     |
+-----------------------+--------------------------+
| Race                  | Unknown                  |
+-----------------------+--------------------------+
| Ethnic Group          | Unknown                  |
+-----------------------+--------------------------+
 
 
 Author
 
 
+--------------+-----------------------+
| Author       | Sherry [employer] |
+--------------+-----------------------+
| Organization | [facility] |
+--------------+-----------------------+
| Address      | Unknown               |
+--------------+-----------------------+
| Phone        | Unavailable           |
+--------------+-----------------------+
 
 
 
 Support
 
 
+---------------+--------------+---------------------+-----------------+
| Name          | Relationship | Address             | Phone           |
+---------------+--------------+---------------------+-----------------+
| Anoop Villalobos | ECON         | [place], | [phone] |
|               |              |  [place]  [address]     |                 |
+---------------+--------------+---------------------+-----------------+
| Jennifer Dickson | ECON         | [place]       | [phone] |
|               |              | [address]               |                 |
+---------------+--------------+---------------------+-----------------+
 
 
 
 
 Care Team Providers
 
 
+-----------------------+------+-----------------+
| Care Team Member Name | Role | Phone           |
+-----------------------+------+-----------------+
| Ivy Couch DO      | PCP  | [phone] |
+-----------------------+------+-----------------+
 
 
 
 Reason for Visit
 
 
+-----------+-----------------------------------------+
| Reason    | Comments                                |
+-----------+-----------------------------------------+
| Paperwork | [facility] |
+-----------+-----------------------------------------+
 
 
 
 Encounter Details
 
 
+--------+-------------+----------------------+---------------------+----------------------+
| Date   | Type        | Department           | Care Team           | Description          |
+--------+-------------+----------------------+---------------------+----------------------+
| / | Documentati |   [facility] |   Luisa Segovia  | Paperwork ([facility]   |
| 2019   | on Only     |  [address] Ankle  [address]      | [address]               | Medical Prosthetics  |
|        |             | [address]   |                     | & Orthotics)         |
|        |             | [place], [place]         |                     |                      |
|        |             | [address]           |                     |                      |
|        |             | [phone]         |                     |                      |
+--------+-------------+----------------------+---------------------+----------------------+
 
 
 
 Social History
 
 
+---------------+------------+-----------+--------+------------------+
| Tobacco Use   | Types      | Packs/Day | Years  | Date             |
|               |            |           | Used   |                  |
+---------------+------------+-----------+--------+------------------+
| Former Smoker | Cigarettes | 1         | 30     | Quit: 2004 |
+---------------+------------+-----------+--------+------------------+
 
 
 
+---------------------+---+---+---+
| Smokeless Tobacco:  |   |   |   |
| Never Used          |   |   |   |
+---------------------+---+---+---+
 
 
 
+------------------------------+
| Comments: quite smoking  |
 
+------------------------------+
 
 
 
+-------------+-----------+---------+----------+
| Alcohol Use | Drinks/We | oz/Week | Comments |
|             | ek        |         |          |
+-------------+-----------+---------+----------+
| No          |           |         |          |
+-------------+-----------+---------+----------+
 
 
 
+------------------+---------------+
| Sex Assigned at  | Date Recorded |
| Birth            |               |
+------------------+---------------+
| Not on file      |               |
+------------------+---------------+
 as of this encounter
 
 Plan of Treatment
 
 
+--------+----------+-----------------+----------------------+-------------+
| Date   | Type     | Specialty       | Care Team            | Description |
+--------+----------+-----------------+----------------------+-------------+
| / | Initial  | General Surgery |   Сергей Medeiros, |             |
|    | consult  |                 |  [address]  |             |
|        |          |                 |  [place] [address]  |             |
|        |          |                 |  [phone]        |             |
|        |          |                 | [phone] (Fax)   |             |
+--------+----------+-----------------+----------------------+-------------+
 as of this encounter
 
 Visit Diagnoses
 Not on filein this encounter

## 2019-04-19 NOTE — XMS
Encounter Summary
  Created on: 2019
 
 Cherie Villalobos
 External Reference #: [id number]
 : 48
 Sex: Female
 
 Demographics
 
 
+-----------------------+--------------------------+
| Address               | [address]               |
|                       | [place]  [address] |
+-----------------------+--------------------------+
| Home Phone            | [phone]          |
+-----------------------+--------------------------+
| Preferred Language    | Unknown                  |
+-----------------------+--------------------------+
| Marital Status        |                   |
+-----------------------+--------------------------+
| Voodoo Affiliation | 1013                     |
+-----------------------+--------------------------+
| Race                  | Unknown                  |
+-----------------------+--------------------------+
| Ethnic Group          | Unknown                  |
+-----------------------+--------------------------+
 
 
 Author
 
 
+--------------+-----------------------+
| Author       | Sherry [employer] |
+--------------+-----------------------+
| Organization | [facility] |
+--------------+-----------------------+
| Address      | Unknown               |
+--------------+-----------------------+
| Phone        | Unavailable           |
+--------------+-----------------------+
 
 
 
 Support
 
 
+---------------+--------------+---------------------+-----------------+
| Name          | Relationship | Address             | Phone           |
+---------------+--------------+---------------------+-----------------+
| Anoop Villalobos | ECON         | [place], | [phone] |
|               |              |  [place]  [address]     |                 |
+---------------+--------------+---------------------+-----------------+
| Jennifer Dickson | ECON         | [place]       | [phone] |
|               |              | [address]               |                 |
+---------------+--------------+---------------------+-----------------+
 
 
 
 
 Care Team Providers
 
 
+-----------------------+------+-----------------+
| Care Team Member Name | Role | Phone           |
+-----------------------+------+-----------------+
| Ivy Couch DO      | PCP  | [phone] |
+-----------------------+------+-----------------+
 
 
 
 Reason for Visit
 
 
+--------+------------------------------------------------------+
| Reason | Comments                                             |
+--------+------------------------------------------------------+
| Other  | [facility] Note |
+--------+------------------------------------------------------+
 
 
 
 Encounter Details
 
 
+--------+-------------+----------------------+----------------------+----------------------
+
| Date   | Type        | Department           | Care Team            | Description          
|
+--------+-------------+----------------------+----------------------+----------------------
+
| / | Documentati |   [facility]      |   João Asher MD  | Other (February      
|
|    | on Only     | [place]  [address]        |  [address]   | 2019-[facility] Provider 
|
|        |             | [address]   | [address]  [place], [place]    |  Dialysis Rounding   
|
|        |             | [place] [address]   | [address] [phone]  | Note)                
|
|        |             | [phone]         |  [phone] (Fax)  |                      
|
+--------+-------------+----------------------+----------------------+----------------------
+
 
 
 
 Social History
 
 
+---------------+------------+-----------+--------+------------------+
| Tobacco Use   | Types      | Packs/Day | Years  | Date             |
|               |            |           | Used   |                  |
+---------------+------------+-----------+--------+------------------+
| Former Smoker | Cigarettes | 1         | 30     | Quit: 2004 |
+---------------+------------+-----------+--------+------------------+
 
 
 
+---------------------+---+---+---+
 
| Smokeless Tobacco:  |   |   |   |
| Never Used          |   |   |   |
+---------------------+---+---+---+
 
 
 
+------------------------------+
| Comments: quite smoking  |
+------------------------------+
 
 
 
+-------------+-----------+---------+----------+
| Alcohol Use | Drinks/We | oz/Week | Comments |
|             | ek        |         |          |
+-------------+-----------+---------+----------+
| No          |           |         |          |
+-------------+-----------+---------+----------+
 
 
 
+------------------+---------------+
| Sex Assigned at  | Date Recorded |
| Birth            |               |
+------------------+---------------+
| Not on file      |               |
+------------------+---------------+
 as of this encounter
 
 Plan of Treatment
 
 
+--------+----------+-----------------+----------------------+-------------+
| Date   | Type     | Specialty       | Care Team            | Description |
+--------+----------+-----------------+----------------------+-------------+
| / | Initial  | General Surgery |   Сергей Medeiros, |             |
|    | consult  |                 |  [address]  |             |
|        |          |                 |  [place] [address]  |             |
|        |          |                 |  [phone]        |             |
|        |          |                 | [phone] (Fax)   |             |
+--------+----------+-----------------+----------------------+-------------+
 as of this encounter
 
 Visit Diagnoses
 Not on filein this encounter

## 2019-04-19 NOTE — XMS
Encounter Summary
  Created on: 2019
 
 Cherie Villalobos
 External Reference #: [id number]
 : 48
 Sex: Female
 
 Demographics
 
 
+-----------------------+--------------------------+
| Address               | [address]               |
|                       | [place]  [address] |
+-----------------------+--------------------------+
| Home Phone            | [phone]          |
+-----------------------+--------------------------+
| Preferred Language    | Unknown                  |
+-----------------------+--------------------------+
| Marital Status        |                   |
+-----------------------+--------------------------+
| Druze Affiliation | 1013                     |
+-----------------------+--------------------------+
| Race                  | Unknown                  |
+-----------------------+--------------------------+
| Ethnic Group          | Unknown                  |
+-----------------------+--------------------------+
 
 
 Author
 
 
+--------------+-----------------------+
| Author       | [facility] |
+--------------+-----------------------+
| Organization | [facility] |
+--------------+-----------------------+
| Address      | Unknown               |
+--------------+-----------------------+
| Phone        | Unavailable           |
+--------------+-----------------------+
 
 
 
 Support
 
 
+---------------+--------------+---------------------+-----------------+
| Name          | Relationship | Address             | Phone           |
+---------------+--------------+---------------------+-----------------+
| Anoop Villalobos | ECON         | [place], | [phone] |
|               |              |  [place]  [address]     |                 |
+---------------+--------------+---------------------+-----------------+
| Jennifer Dickson | ECON         | [place]       | [phone] |
|               |              | [address]               |                 |
+---------------+--------------+---------------------+-----------------+
 
 
 
 
 Care Team Providers
 
 
+-----------------------+------+-----------------+
| Care Team Member Name | Role | Phone           |
+-----------------------+------+-----------------+
| Ivy Couch DO      | PCP  | [phone] |
+-----------------------+------+-----------------+
 
 
 
 Reason for Visit
 
 
+-----------+-------------------+
| Reason    | Comments          |
+-----------+-------------------+
| Follow-up | reschedule /10  |
+-----------+-------------------+
 
 
 
 Encounter Details
 
 
+--------+-----------+----------------------+----------------------+---------------------+
| Date   | Type      | Department           | Care Team            | Description         |
+--------+-----------+----------------------+----------------------+---------------------+
| 04/10/ | Telephone |   [facility] |   Srinivasan Jordan,  | Follow-up           |
| 2019   |           |  [address] Ankle  [address]      | DPM  [address], | (reschedule 04/10 ) |
|        |           | [address]   |  [address]  [place],  |                     |
|        |           | [place], [place]         | [place] [address]             |                     |
|        |           | [address]           | [phone]         |                     |
|        |           | [phone]         | [phone] (Fax)   |                     |
+--------+-----------+----------------------+----------------------+---------------------+
 
 
 
 Social History
 
 
+---------------+------------+-----------+--------+------------------+
| Tobacco Use   | Types      | Packs/Day | Years  | Date             |
|               |            |           | Used   |                  |
+---------------+------------+-----------+--------+------------------+
| Former Smoker | Cigarettes | 1         | 30     | Quit: 2004 |
+---------------+------------+-----------+--------+------------------+
 
 
 
+---------------------+---+---+---+
| Smokeless Tobacco:  |   |   |   |
| Never Used          |   |   |   |
+---------------------+---+---+---+
 
 
 
+------------------------------+
| Comments: quite smoking  |
 
+------------------------------+
 
 
 
+-------------+-----------+---------+----------+
| Alcohol Use | Drinks/We | oz/Week | Comments |
|             | ek        |         |          |
+-------------+-----------+---------+----------+
| No          |           |         |          |
+-------------+-----------+---------+----------+
 
 
 
+------------------+---------------+
| Sex Assigned at  | Date Recorded |
| Birth            |               |
+------------------+---------------+
| Not on file      |               |
+------------------+---------------+
 as of this encounter
 
 Plan of Treatment
 
 
+--------+----------+-----------------+----------------------+-------------+
| Date   | Type     | Specialty       | Care Team            | Description |
+--------+----------+-----------------+----------------------+-------------+
| / | Initial  | General Surgery |   Сергей Medeiros, |             |
|    | consult  |                 |  [address]  |             |
|        |          |                 |  [place] [address]  |             |
|        |          |                 |  [phone]        |             |
|        |          |                 | [phone] (Fax)   |             |
+--------+----------+-----------------+----------------------+-------------+
 as of this encounter
 
 Visit Diagnoses
 Not on filein this encounter

## 2019-04-19 NOTE — XMS
Encounter Summary
  Created on: 2019
 
 Cherie Villalobos
 External Reference #: [id number]
 : 48
 Sex: Female
 
 Demographics
 
 
+-----------------------+--------------------------+
| Address               | [address]               |
|                       | [place]  [address] |
+-----------------------+--------------------------+
| Home Phone            | [phone]          |
+-----------------------+--------------------------+
| Preferred Language    | Unknown                  |
+-----------------------+--------------------------+
| Marital Status        |                   |
+-----------------------+--------------------------+
| Sikh Affiliation | 1013                     |
+-----------------------+--------------------------+
| Race                  | Unknown                  |
+-----------------------+--------------------------+
| Ethnic Group          | Unknown                  |
+-----------------------+--------------------------+
 
 
 Author
 
 
+--------------+-----------------------+
| Author       | [facility] |
+--------------+-----------------------+
| Organization | [facility] |
+--------------+-----------------------+
| Address      | Unknown               |
+--------------+-----------------------+
| Phone        | Unavailable           |
+--------------+-----------------------+
 
 
 
 Support
 
 
+---------------+--------------+---------------------+-----------------+
| Name          | Relationship | Address             | Phone           |
+---------------+--------------+---------------------+-----------------+
| Anoop Villalobos | ECON         | [place], | [phone] |
|               |              |  [place]  [address]     |                 |
+---------------+--------------+---------------------+-----------------+
| Jennifer Dickson | ECON         | [place]       | [phone] |
|               |              | [address]               |                 |
+---------------+--------------+---------------------+-----------------+
 
 
 
 
 Care Team Providers
 
 
+-----------------------+------+-----------------+
| Care Team Member Name | Role | Phone           |
+-----------------------+------+-----------------+
| Ivy Couch DO      | PCP  | [phone] |
+-----------------------+------+-----------------+
 
 
 
 Reason for Visit
 
 
+--------+------------------------------------------+
| Reason | Comments                                 |
+--------+------------------------------------------+
| Other  | Requesting status on form for shoe order |
+--------+------------------------------------------+
 
 
 
 Encounter Details
 
 
+--------+-----------+----------------------+----------------------+---------------------+
| Date   | Type      | Department           | Care Team            | Description         |
+--------+-----------+----------------------+----------------------+---------------------+
| / | Telephone |   [facility] |   Srinivasan Jordan,  | Other (Requesting   |
| 2019   |           |  [address] Ankle  [address]      | DPM  [address], | status on form for  |
|        |           | [address]   |  [address]  [place],  | shoe order)         |
|        |           | [place], [place]         | [place] [address]             |                     |
|        |           | [address]           | [phone]         |                     |
|        |           | [phone]         | [phone] (Fax)   |                     |
+--------+-----------+----------------------+----------------------+---------------------+
 
 
 
 Social History
 
 
+---------------+------------+-----------+--------+------------------+
| Tobacco Use   | Types      | Packs/Day | Years  | Date             |
|               |            |           | Used   |                  |
+---------------+------------+-----------+--------+------------------+
| Former Smoker | Cigarettes | 1         | 30     | Quit: 2004 |
+---------------+------------+-----------+--------+------------------+
 
 
 
+---------------------+---+---+---+
| Smokeless Tobacco:  |   |   |   |
| Never Used          |   |   |   |
+---------------------+---+---+---+
 
 
 
+------------------------------+
| Comments: quite smoking  |
 
+------------------------------+
 
 
 
+-------------+-----------+---------+----------+
| Alcohol Use | Drinks/We | oz/Week | Comments |
|             | ek        |         |          |
+-------------+-----------+---------+----------+
| No          |           |         |          |
+-------------+-----------+---------+----------+
 
 
 
+------------------+---------------+
| Sex Assigned at  | Date Recorded |
| Birth            |               |
+------------------+---------------+
| Not on file      |               |
+------------------+---------------+
 as of this encounter
 
 Plan of Treatment
 
 
+--------+----------+-----------------+----------------------+-------------+
| Date   | Type     | Specialty       | Care Team            | Description |
+--------+----------+-----------------+----------------------+-------------+
| / | Initial  | General Surgery |   Сергей Medeiros, |             |
|    | consult  |                 |  [address]  |             |
|        |          |                 |  [place] [address]  |             |
|        |          |                 |  [phone]        |             |
|        |          |                 | [phone] (Fax)   |             |
+--------+----------+-----------------+----------------------+-------------+
 as of this encounter
 
 Visit Diagnoses
 Not on filein this encounter

## 2019-04-19 NOTE — XMS
Encounter Summary
  Created on: 2019
 
 Cherie Villalobos
 External Reference #: [id number]
 : 48
 Sex: Female
 
 Demographics
 
 
+-----------------------+--------------------------+
| Address               | [address]               |
|                       | [place]  [address] |
+-----------------------+--------------------------+
| Home Phone            | [phone]          |
+-----------------------+--------------------------+
| Preferred Language    | Unknown                  |
+-----------------------+--------------------------+
| Marital Status        |                   |
+-----------------------+--------------------------+
| Catholic Affiliation | 1013                     |
+-----------------------+--------------------------+
| Race                  | Unknown                  |
+-----------------------+--------------------------+
| Ethnic Group          | Unknown                  |
+-----------------------+--------------------------+
 
 
 Author
 
 
+--------------+-----------------------+
| Author       | [facility] |
+--------------+-----------------------+
| Organization | [facility] |
+--------------+-----------------------+
| Address      | Unknown               |
+--------------+-----------------------+
| Phone        | Unavailable           |
+--------------+-----------------------+
 
 
 
 Support
 
 
+---------------+--------------+---------------------+-----------------+
| Name          | Relationship | Address             | Phone           |
+---------------+--------------+---------------------+-----------------+
| Anoop Villalobos | ECON         | [place], | [phone] |
|               |              |  [place]  [address]     |                 |
+---------------+--------------+---------------------+-----------------+
| Jennifer Dickson | ECON         | [place]       | [phone] |
|               |              | [address]               |                 |
+---------------+--------------+---------------------+-----------------+
 
 
 
 
 Care Team Providers
 
 
+-----------------------+------+-----------------+
| Care Team Member Name | Role | Phone           |
+-----------------------+------+-----------------+
| Ivy Couch DO      | PCP  | [phone] |
+-----------------------+------+-----------------+
 
 
 
 Encounter Details
 
 
+--------+-------------+----------------------+---------------------+-------------+
| Date   | Type        | Department           | Care Team           | Description |
+--------+-------------+----------------------+---------------------+-------------+
| / | Orders Only |   [facility]    |   Jessica Marley,  |             |
| 2019   |             | [facility]  | RDMS                |             |
|        |             | Ultrasound  [address]      |                     |             |
|        |             | [address]           |                     |             |
|        |             | [place] [address]   |                     |             |
|        |             | [phone]         |                     |             |
+--------+-------------+----------------------+---------------------+-------------+
 
 
 
 Social History
 
 
+---------------+------------+-----------+--------+------------------+
| Tobacco Use   | Types      | Packs/Day | Years  | Date             |
|               |            |           | Used   |                  |
+---------------+------------+-----------+--------+------------------+
| Former Smoker | Cigarettes | 1         | 30     | Quit: 2004 |
+---------------+------------+-----------+--------+------------------+
 
 
 
+---------------------+---+---+---+
| Smokeless Tobacco:  |   |   |   |
| Never Used          |   |   |   |
+---------------------+---+---+---+
 
 
 
+------------------------------+
| Comments: quite smoking 2004 |
+------------------------------+
 
 
 
+-------------+-----------+---------+----------+
| Alcohol Use | Drinks/We | oz/Week | Comments |
|             | ek        |         |          |
+-------------+-----------+---------+----------+
| No          |           |         |          |
+-------------+-----------+---------+----------+
 
 
 
 
+------------------+---------------+
| Sex Assigned at  | Date Recorded |
| Birth            |               |
+------------------+---------------+
| Not on file      |               |
+------------------+---------------+
 as of this encounter
 
 Plan of Treatment
 
 
+--------+----------+-----------------+----------------------+-------------+
| Date   | Type     | Specialty       | Care Team            | Description |
+--------+----------+-----------------+----------------------+-------------+
| / | Initial  | General Surgery |   Сергей Medeiros, |             |
| 2019   | consult  |                 |  [address]  |             |
|        |          |                 |  [place] [address]  |             |
|        |          |                 |  [phone]        |             |
|        |          |                 | [phone] (Fax)   |             |
+--------+----------+-----------------+----------------------+-------------+
 as of this encounter
 
 Visit Diagnoses
 Not on filein this encounter

## 2019-04-19 NOTE — XMS
Encounter Summary
  Created on: 2019
 
 Cherie Villalobos
 External Reference #: [id number]
 : 48
 Sex: Female
 
 Demographics
 
 
+-----------------------+--------------------------+
| Address               | [address]               |
|                       | [place]  [address] |
+-----------------------+--------------------------+
| Home Phone            | [phone]          |
+-----------------------+--------------------------+
| Preferred Language    | Unknown                  |
+-----------------------+--------------------------+
| Marital Status        |                   |
+-----------------------+--------------------------+
| Hindu Affiliation | 1013                     |
+-----------------------+--------------------------+
| Race                  | Unknown                  |
+-----------------------+--------------------------+
| Ethnic Group          | Unknown                  |
+-----------------------+--------------------------+
 
 
 Author
 
 
+--------------+-----------------------+
| Author       | [facility] |
+--------------+-----------------------+
| Organization | [facility] |
+--------------+-----------------------+
| Address      | Unknown               |
+--------------+-----------------------+
| Phone        | Unavailable           |
+--------------+-----------------------+
 
 
 
 Support
 
 
+---------------+--------------+---------------------+-----------------+
| Name          | Relationship | Address             | Phone           |
+---------------+--------------+---------------------+-----------------+
| Anoop Villalobos | ECON         | [place], | [phone] |
|               |              |  [place]  [address]     |                 |
+---------------+--------------+---------------------+-----------------+
| Jennifer Dickson | ECON         | [place]       | [phone] |
|               |              | [address]               |                 |
+---------------+--------------+---------------------+-----------------+
 
 
 
 
 Care Team Providers
 
 
+-----------------------+------+-----------------+
| Care Team Member Name | Role | Phone           |
+-----------------------+------+-----------------+
| Ivy Couch DO      | PCP  | [phone] |
+-----------------------+------+-----------------+
 
 
 
 Reason for Visit
 
 
+-----------+-------------+
| Reason    | Comments    |
+-----------+-------------+
| Follow-up | appointment |
+-----------+-------------+
 
 
 
 Encounter Details
 
 
+--------+-----------+----------------------+----------------------+---------------+
| Date   | Type      | Department           | Care Team            | Description   |
+--------+-----------+----------------------+----------------------+---------------+
| / | Telephone |   [facility] |   Srinivasan Jordan,  | Follow-up     |
| 2019   |           |  and Ankle  [address]      | DPM  [address], | (appointment) |
|        |           | [address]   |  [address]  [place],  |               |
|        |           | [place], [place]         | [place] [address]             |               |
|        |           | [address]           | [phone]         |               |
|        |           | [phone]         | [phone] (Fax)   |               |
+--------+-----------+----------------------+----------------------+---------------+
 
 
 
 Social History
 
 
+---------------+------------+-----------+--------+------------------+
| Tobacco Use   | Types      | Packs/Day | Years  | Date             |
|               |            |           | Used   |                  |
+---------------+------------+-----------+--------+------------------+
| Former Smoker | Cigarettes | 1         | 30     | Quit: 2004 |
+---------------+------------+-----------+--------+------------------+
 
 
 
+---------------------+---+---+---+
| Smokeless Tobacco:  |   |   |   |
| Never Used          |   |   |   |
+---------------------+---+---+---+
 
 
 
+------------------------------+
| Comments: quite smoking 2004 |
 
+------------------------------+
 
 
 
+-------------+-----------+---------+----------+
| Alcohol Use | Drinks/We | oz/Week | Comments |
|             | ek        |         |          |
+-------------+-----------+---------+----------+
| No          |           |         |          |
+-------------+-----------+---------+----------+
 
 
 
+------------------+---------------+
| Sex Assigned at  | Date Recorded |
| Birth            |               |
+------------------+---------------+
| Not on file      |               |
+------------------+---------------+
 as of this encounter
 
 Plan of Treatment
 
 
+--------+----------+-----------------+----------------------+-------------+
| Date   | Type     | Specialty       | Care Team            | Description |
+--------+----------+-----------------+----------------------+-------------+
| / | Initial  | General Surgery |   Сергей Medeiros, |             |
|    | consult  |                 |  [address]  |             |
|        |          |                 |  [place] [address]  |             |
|        |          |                 |  [phone]        |             |
|        |          |                 | [phone] (Fax)   |             |
+--------+----------+-----------------+----------------------+-------------+
 as of this encounter
 
 Visit Diagnoses
 Not on filein this encounter

## 2019-04-19 NOTE — XMS
Clinical Summary
  Created on: 2019
 
 Cherie Jo
 External Reference #: [id number]
 : 48
 Sex: Female
 
 Demographics
 
 
+-----------------------+--------------------------+
| Address               | [address]               |
|                       | [place]  [address] |
+-----------------------+--------------------------+
| Home Phone            | [phone]          |
+-----------------------+--------------------------+
| Preferred Language    | Unknown                  |
+-----------------------+--------------------------+
| Marital Status        |                   |
+-----------------------+--------------------------+
| Moravian Affiliation | 1013                     |
+-----------------------+--------------------------+
| Race                  | Unknown                  |
+-----------------------+--------------------------+
| Ethnic Group          | Unknown                  |
+-----------------------+--------------------------+
 
 
 Author
 
 
+--------------+-----------------------+
| Author       | [facility] [employer] [facility] |
+--------------+-----------------------+
| Organization | [facility] |
+--------------+-----------------------+
| Address      | Unknown               |
+--------------+-----------------------+
| Phone        | Unavailable           |
+--------------+-----------------------+
 
 
 
 Support
 
 
+---------------+--------------+---------------------+-----------------+
| Name          | Relationship | Address             | Phone           |
+---------------+--------------+---------------------+-----------------+
| Anoop Jo | ECON         | [place], | [phone] |
|               |              |  [place]  [address]     |                 |
+---------------+--------------+---------------------+-----------------+
| Jennifer Dickson | ECON         | [place]       | [phone] |
|               |              | [id number]               |                 |
+---------------+--------------+---------------------+-----------------+
 
 
 
 
 Care Team Providers
 
 
+-----------------------+------+-----------------+
| Care Team Member Name | Role | Phone           |
+-----------------------+------+-----------------+
| Ivy Couch DO      | PP   | [phone] |
+-----------------------+------+-----------------+
 
 
 
 Allergies
 
 
+---------------------+----------------------+----------+----------+----------------------+
| Active Allergy      | Reactions            | Severity | Noted    | Comments             |
|                     |                      |          | Date     |                      |
+---------------------+----------------------+----------+----------+----------------------+
| Digitoxin           | Other (See Comments) | High     | 20 |   Toxic, patient     |
|                     |                      |          | 18       | states her eyes were |
|                     |                      |          |          |  also affected "she  |
|                     |                      |          |          | seen yellow          |
|                     |                      |          |          | everywhere"          |
+---------------------+----------------------+----------+----------+----------------------+
| Digoxin And Related | Other (See Comments) | Medium   | 20 |   toxic              |
|                     |                      |          | 18       |                      |
+---------------------+----------------------+----------+----------+----------------------+
| Metaxalone          | Other (See Comments) | Medium   |          |                      |
+---------------------+----------------------+----------+----------+----------------------+
| Morphine            | Mental Changes,      | High     | 20 |   "makes me feel     |
|                     | Other (See           |          | 12       | jittery"  Other      |
|                     | Comments), Anxiety   |          |          | reaction(s): MAKES   |
|                     |                      |          |          | HER "CRAZY"          |
|                     |                      |          |          | Hallucinations  Make |
|                     |                      |          |          |  her crazy/ "funny   |
|                     |                      |          |          | feeling in my head"  |
|                     |                      |          |          |  Has used            |
|                     |                      |          |          | hydromorphone        |
|                     |                      |          |          | in-house             |
+---------------------+----------------------+----------+----------+----------------------+
| Pantoprazole Sodium | Nausea and Vomiting  | Medium   | 20 |                      |
|                     |                      |          | 18       |                      |
+---------------------+----------------------+----------+----------+----------------------+
| Penicillin G        | Hives                | High     | 20 |                      |
|                     |                      |          | 10       |                      |
+---------------------+----------------------+----------+----------+----------------------+
| Penicillins         | Rash, Hives          | High     | 20 |   Other reaction(s): |
|                     |                      |          | 11       |  RASH  Tolerates     |
|                     |                      |          |          | cephalosporins       |
+---------------------+----------------------+----------+----------+----------------------+
| Quinapril Hcl       | Edema                | Medium   | 20 |   Facial Edema       |
|                     |                      |          | 13       |                      |
+---------------------+----------------------+----------+----------+----------------------+
| Quinapril Hcl       | Anaphylaxis          | High     | 20 |                      |
|                     |                      |          | 18       |                      |
+---------------------+----------------------+----------+----------+----------------------+
| Quinapril Hcl       | Angioedema           | High     | 20 |   Facial Edema       |
|                     |                      |          | 13       |                      |
+---------------------+----------------------+----------+----------+----------------------+
 
| Pseudoephedrine Hcl | Rash                 | Medium   | 20 |                      |
|                     |                      |          | 11       |                      |
+---------------------+----------------------+----------+----------+----------------------+
 
 
 
 Current Medications
 
 
+----------------------+----------------------+--------+---------+------+------+-------+
| Prescription         | Sig.                 | Disp.  | Refills | Star | End  | Statu |
|                      |                      |        |         | t    | Date | s     |
|                      |                      |        |         | Date |      |       |
+----------------------+----------------------+--------+---------+------+------+-------+
|   rOPINIRole         | Take 0.75 mg by      |        |         |      |      | Activ |
| (REQUIP) 0.25 MG     | mouth nightly.       |        |         |      |      | e     |
| tablet               |                      |        |         |      |      |       |
+----------------------+----------------------+--------+---------+------+------+-------+
|   nortriptyline      | Take 25-75 mg by     |        |         |      |      | Activ |
| (PAMELOR) 25 MG      | mouth See Admin      |        |         |      |      | e     |
| capsule              | Instructions. Takes  |        |         |      |      |       |
|                      | 25 mg by mouth every |        |         |      |      |       |
|                      |  morning and 75 mg   |        |         |      |      |       |
|                      | every night          |        |         |      |      |       |
+----------------------+----------------------+--------+---------+------+------+-------+
|   oxybutynin         | Take 7.5 mg by mouth |        |         |      |      | Activ |
| (DITROPAN) 5 MG      |  2 (two) times       |        |         |      |      | e     |
| tablet               | daily.               |        |         |      |      |       |
+----------------------+----------------------+--------+---------+------+------+-------+
|   albuterol          | Inhale 2 puffs into  |        |         |      |      | Activ |
| (PROVENTIL           | the lungs every 4    |        |         |      |      | e     |
| HFA;VENTOLIN HFA)    | (four) hours as      |        |         |      |      |       |
| 108 (90 Base)        | needed for Wheezing. |        |         |      |      |       |
| MCG/ACT              |                      |        |         |      |      |       |
| inhalerIndications:  |                      |        |         |      |      |       |
| states uses 2x       |                      |        |         |      |      |       |
| monthly average      |                      |        |         |      |      |       |
+----------------------+----------------------+--------+---------+------+------+-------+
|   insulin aspart     | Inject  into the     |        |         |      |      | Activ |
| (NOVOLOG) 100        | skin 3 (three) times |        |         |      |      | e     |
| UNIT/ML injection    |  daily before meals. |        |         |      |      |       |
|                      |  Sliding scale       |        |         |      |      |       |
+----------------------+----------------------+--------+---------+------+------+-------+
|   apixaban (ELIQUIS) | Take 1 tablet by     |   60   | 0       | 12/0 |      | Activ |
|  2.5 MG tablet       | mouth 2 (two) times  | tablet |         | 3/20 |      | e     |
|                      | daily.               |        |         | 18   |      |       |
+----------------------+----------------------+--------+---------+------+------+-------+
|   isosorbide         | Take 1 tablet by     |   30   | 1       | 12/0 | 12/0 | Activ |
| mononitrate (IMDUR)  | mouth daily.         | tablet |         | 5/20 | 5/20 | e     |
| 60 MG 24 hr tablet   |                      |        |         | 18   | 19   |       |
+----------------------+----------------------+--------+---------+------+------+-------+
|   ondansetron        | Take 4 mg by mouth   |        |         | 09/ |      | Activ |
| (ZOFRAN-ODT) 4 MG    | every 8 (eight)      |        |         | / |      | e     |
| disintegrating       | hours as needed.     |        |         | 18   |      |       |
| tablet               |                      |        |         |      |      |       |
+----------------------+----------------------+--------+---------+------+------+-------+
|   insulin degludec   | Inject 21 units      |        |         |      |      | Activ |
| (TRESIBA) 100        | every night          |        |         |      |      | e     |
| UNIT/ML injection    |                      |        |         |      |      |       |
+----------------------+----------------------+--------+---------+------+------+-------+
 
|   esomeprazole       | Take 1 capsule by    |        |         |      |      | Activ |
| (NEXIUM) 40 MG       | mouth every day      |        |         |      |      | e     |
| capsule              |                      |        |         |      |      |       |
+----------------------+----------------------+--------+---------+------+------+-------+
|                      | Take 1 tablet by     |   30   | 0       | 01/0 |      | Activ |
| HYDROcodone-acetamin | mouth every 4 (four) | tablet |         | 4/20 |      | e     |
| ophen (NORCO) 5-325  |  hours as needed.    |        |         | 19   |      |       |
| MG per tablet        |                      |        |         |      |      |       |
+----------------------+----------------------+--------+---------+------+------+-------+
|   carvedilol (COREG) | Take 1 tablet by     |   60   | 0       | 01/1 | 01/1 | Activ |
|  12.5 MG tablet      | mouth 2 (two) times  | tablet |         | /20 | 7/20 | e     |
|                      | daily with meals.    |        |         | 19   | 20   |       |
+----------------------+----------------------+--------+---------+------+------+-------+
|   nitroGLYCERIN      | Place 1 tablet under |   30   | 0       |  |  | Activ |
| (NITROSTAT) 0.4 MG   |  the tongue every 5  | tablet |         | /20 | 7 | e     |
| SL tablet            | (five) minutes as    |        |         | 19   | 20   |       |
|                      | needed for Chest     |        |         |      |      |       |
|                      | pain.                |        |         |      |      |       |
+----------------------+----------------------+--------+---------+------+------+-------+
|   prochlorperazine 5 | TAKE ONE TABLET BY   |   60   | 11      |  |      | Activ |
|  MG tablet           | MOUTH TWICE DAILY AS | tablet |         |  |      | e     |
|                      |  NEEDED FOR NAUSEA   |        |         | 19   |      |       |
+----------------------+----------------------+--------+---------+------+------+-------+
|   losartan (COZAAR)  | Take 100 mg by mouth |        |         | /2 |      | Activ |
| 100 MG tablet        |  daily.              |        |         | 020 |      | e     |
|                      |                      |        |         | 19   |      |       |
+----------------------+----------------------+--------+---------+------+------+-------+
|   TRINTELLIX 20 MG   | Take 20 mg by mouth  |        |         | /2 |      | Activ |
| TABS                 | every evening.       |        |         | 020 |      | e     |
|                      |                      |        |         | 19   |      |       |
+----------------------+----------------------+--------+---------+------+------+-------+
|   atorvastatin       | Take 80 mg by mouth  |        |         | 02/0 |      | Activ |
| (LIPITOR) 80 MG      | nightly.             |        |         | 3/20 |      | e     |
| tablet               |                      |        |         | 19   |      |       |
+----------------------+----------------------+--------+---------+------+------+-------+
|   LORazepam (ATIVAN) | Take 0.5 tablets by  |        |         | 02/1 |      | Activ |
|  1 MG tablet         | mouth every 8        |        |         | 5/20 |      | e     |
|                      | (eight) hours as     |        |         | 19   |      |       |
|                      | needed for Anxiety.  |        |         |      |      |       |
|                      | Patient states she   |        |         |      |      |       |
|                      | has the 1mg tablets  |        |         |      |      |       |
|                      | at home and that     |        |         |      |      |       |
|                      | they are scored and  |        |         |      |      |       |
|                      | she will split them  |        |         |      |      |       |
|                      | in half              |        |         |      |      |       |
+----------------------+----------------------+--------+---------+------+------+-------+
|   glucose blood test | Contour Next Test    |        |         |      |      | Activ |
|  strip               | Strips USE 1 STRIP   |        |         |      |      | e     |
|                      | TO CHECK GLUCOSE     |        |         |      |      |       |
|                      | THREE TIMES DAILY    |        |         |      |      |       |
|                      | FOR 30 DAYS          |        |         |      |      |       |
+----------------------+----------------------+--------+---------+------+------+-------+
|   LORazepam (ATIVAN) |                      |        |         | 02/2 |      | Activ |
|  0.5 MG tablet       |                      |        |         | 1/20 |      | e     |
|                      |                      |        |         | 19   |      |       |
+----------------------+----------------------+--------+---------+------+------+-------+
|   traMADol (ULTRAM)  | Take 1 tablet by     |   30   | 0       | 02/2 |      | Activ |
| 50 MG tablet         | mouth every 6 (six)  | tablet |         | 8/20 |      | e     |
|                      | hours as needed for  |        |         | 19   |      |       |
|                      | Pain.                |        |         |      |      |       |
 
+----------------------+----------------------+--------+---------+------+------+-------+
|   calcium acetate,   | Take 1 tablet every  |        |         |      |      | Activ |
| Phos Binder,         | day by oral route.   |        |         |      |      | e     |
| (PHOSLO) 667 MG      |                      |        |         |      |      |       |
| tablet               |                      |        |         |      |      |       |
+----------------------+----------------------+--------+---------+------+------+-------+
|   colestipol         | 1 tab daily.         |        |         |      |      | Activ |
| (COLESTID) 1 g       | Increase dose by 1   |        |         |      |      | e     |
| tablet               | tab every 3 days to  |        |         |      |      |       |
|                      | a max of 2 tabs      |        |         |      |      |       |
|                      | twice daily to       |        |         |      |      |       |
|                      | control diarrhea     |        |         |      |      |       |
+----------------------+----------------------+--------+---------+------+------+-------+
 
 
 
 Active Problems
 
 
+-----------------------------------------------------+------------+
| Problem                                             | Noted Date |
+-----------------------------------------------------+------------+
| Macrocytosis                                        | 2019 |
+-----------------------------------------------------+------------+
| Lactic acidosis                                     | 2019 |
+-----------------------------------------------------+------------+
| Falls frequently                                    | 2019 |
+-----------------------------------------------------+------------+
| Restless legs syndrome                              | 2019 |
+-----------------------------------------------------+------------+
| Overactive bladder                                  | 2019 |
+-----------------------------------------------------+------------+
| CAD (coronary artery disease)                       | 2018 |
+-----------------------------------------------------+------------+
| Chronic multifocal osteomyelitis of left foot (HCC) | 2018 |
+-----------------------------------------------------+------------+
| PVD (peripheral vascular disease) (HCC)             | 2018 |
+-----------------------------------------------------+------------+
| COPD (chronic obstructive pulmonary disease) (HCC)  | 2018 |
+-----------------------------------------------------+------------+
| Mixed hyperlipidemia                                | 2018 |
+-----------------------------------------------------+------------+
 
 
 
+---------------------------------------------------------------+
|   Overview:   Last Assessment & Plan:                         |
| Moderate dose statin and don't titrate due to ESRD and ASHD.  |
| - Continue Atorvastatin 20mg                                  |
+---------------------------------------------------------------+
 
 
 
+-------------------------------------------------------+------------+
| ICD (implantable cardioverter-defibrillator) in place | 2018 |
+-------------------------------------------------------+------------+
 
 
 
+-------------------------------------------------------------------+
 
|   Overview:   Overview: Formatting of this note may be different  |
| from the original. MODEL NAME MODEL# SERIAL# DATE IMPLANTED       |
| GENERATOR [employer] Dyn[employer] EL ICD DF4 D150 [id number] |
|  RV LEAD [employer] [place] [employer] [place] 0292 [id number] [phone]  |
| Indication: Nonischemic dilated cardiopathy with persistent       |
| severely depressed left ventricular systolic function, LVEF of    |
| LVEF of 30%                                                       |
+-------------------------------------------------------------------+
 
 
 
+-----------------------------------------------+------------+
| Implantable defibrillator reprogramming/check | 2018 |
+-----------------------------------------------+------------+
| Pleural effusion                              | 2018 |
+-----------------------------------------------+------------+
 
 
 
+-------------------------------------------------------------------+
|   Overview:   Last Assessment & Plan: Post MV repair and CABG x2  |
| 2018 and recurrent pleural effusions and elevated ESR.       |
| Plan:Received 1 time dose of Colchicine Started on Prednisone per |
|  surgery CXR today shows stable bilateral pleural effusions       |
|Received 1 time dose of Colchicine                                 |
|Started on Prednisone per surgery                                  |
|CXR today shows stable bilateral pleural effusions                 |
+-------------------------------------------------------------------+
 
 
 
+-------------------------------------------------+------------+
| Prolonged Q-T interval on ECG                   | 2018 |
+-------------------------------------------------+------------+
| Chronic systolic congestive heart failure (HCC) | 2018 |
+-------------------------------------------------+------------+
 
 
 
+-------------------------------------------------------------------+
|   Overview:   Overview: Echocardiogram 2018 shows mild       |
| biatrial dilatation, mild left ventricular dilatation with a mild |
|  eccentric left ventricular hypertrophy, there is a moderate      |
| global hypokinesis of left ventricle, lvef is 30-35%, normal      |
| right ventricular size and wall thickness with a mildly reduced   |
| right ventricular systolic function, mildly thickened and         |
| calcified trileaflet aortic valve with adequate opening, there is |
|  a mild aortic valve insufficiency, mildly thickened and          |
| calcified mitral valve suggesting myxomatous change with evidence |
|  of mitral valve repair, there is at least moderate central       |
| mitral valve regurgitation, mild tricuspid valve regurgitation,   |
| mild to moderate pulmonary hypertension with a peak systolic      |
| pressure of 50-55 mmhg, normal ivc with normal respiratory        |
| collapse, when compared to echocardiography on 3/7/18, left       |
| ventricular systolicsystolic function is slightly                 |
| improved.Echocardiogram 2018 show overall left ventricular    |
| systolic function is severely impaired with, an EF between 20 -   |
| 25 %, there is mild aortic regurgitation, there is mild aortic    |
| stenosis present, mild-to-moderate tricuspid regurgitation        |
| present, there is mild pulmonary hypertension, pleural effusion   |
 
| present. Duglas Ornelas MD, FACC; [facility] Last Assessment & Plan: EF |
|  25% s/p CABG on 18 but now down to 15%. Plan:Continue Coreg |
|  and Losartan Hemodialysis for fluid removal.Strict I/O and daily |
|  weights.                                                         |
+-------------------------------------------------------------------+
 
 
 
+------------------+------------+
| Chronic diarrhea | 2018 |
+------------------+------------+
 
 
 
+---------------------------------------------+
|   Overview:   Colitis as working diagnosis. |
+---------------------------------------------+
 
 
 
+---------------------------------------------+------------+
| Chronic anticoagulation                     | 2018 |
+---------------------------------------------+------------+
| Plantar ulcer (HCC)                         | 2018 |
+---------------------------------------------+------------+
| Steroid-induced hyperglycemia               | 2018 |
+---------------------------------------------+------------+
| Moderate protein-calorie malnutrition (HCC) | 2018 |
+---------------------------------------------+------------+
| Stress hyperglycemia                        | 2018 |
+---------------------------------------------+------------+
| Cardiomyopathy (HCC)                        | 2018 |
+---------------------------------------------+------------+
 
 
 
+-------------------------------------------------------------------+
|   Overview:   Overview: Ischemic (3 V CAD) and non ischemic       |
| (Hypertension and MR and DM and ESRD). Tolerated metoprolol,      |
| losartan but avoid spironolactone due to ESRD. Last Assessment &  |
| Plan: Severe ischemic cardiomyopathy (EF 15%) and ESRD on HD who  |
| presented with dyspnea related to bilateral pleural effusions and |
|  episodic AF with RVR now s/p thoracentesis with significant      |
| improvement in her symptoms and in NSR. Plan:Volume removal with  |
| dialysis as toleratedLasix 80 mg twice a day Coreg and low dose   |
| losartan. Up titrate as tolerated but working on fluid removal    |
| and maintaining adequate BP. Strict I/O with daily weights.       |
|Strict I/O with daily weights.                                     |
+-------------------------------------------------------------------+
 
 
 
+--------------------------------------+------------+
| Paroxysmal atrial fibrillation (HCC) | 2018 |
+--------------------------------------+------------+
 
 
 
+-------------------------------------------------------------------------------------------
--------------------------------------------------------+
 
|   Overview:   Overview: PAF with dialysis in the past and                                 
                                                        |
| recurrent post op MV/CABG surgery. Last Assessment & Plan:                                
                                                        |
| Remains in NSRNormal atrial size by echocardiogram. Plan:Continue                         
                                                        |
|  Coreg with up-titration as toleratedAmiodarone 400 mg BID while                          
                                                        |
| inpatient and then transition to 200 mg BID for 2 weeks and then                          
                                                        |
| 200 mg daily. Continue po 1-3 months post discharge. Continue                             
                                                        |
| warfarin for CVA prophylaxis, INR 2.3 today                                               
                                                        |
|Plan:                                                                                      
                                                       |
|Continue Coreg with up-titration as tolerated                                              
                                                        |
|Amiodarone 400 mg BID while inpatient and then transition to 200 mg BID for 2 weeks and the
n 200 mg daily. Continue po 1-3 months post discharge.  |
|Continue warfarin for CVA prophylaxis, INR 2.3 today                                       
                                                        |
+-------------------------------------------------------------------------------------------
--------------------------------------------------------+
 
 
 
+--------------+------------+
| S/P CABG x 2 | 2018 |
+--------------+------------+
 
 
 
+-------------------------------------------------------------------------------------------
-----------+
|   Overview:   Overview: SVG's to OM and RCA at time of MV ring                            
           |
| Cristopher #28. POLY left.Last Assessment & Plan: 18: Mitral                            
           |
| valve repair with a 28 Cristopher annuloplasty ring; CABG x2 (SVG                           
           |
| to OM and SVG to RCA)Plan:ASA, statin, BB and ARB                                         
           |
|18: Mitral valve repair with a 28 Cristopher annuloplasty ring; CABG x2 (SVG to OM and S
VG to RCA) |
|                                                                                           
           |
|Plan:                                                                                      
          |
|ASA, statin, BB and ARB                                                                    
           |
+-------------------------------------------------------------------------------------------
-----------+
 
 
 
+-------------------------+------------+
| S/P mitral valve repair | 2018 |
+-------------------------+------------+
 
 
 
 
+----------------------------------------------------------------------------------------+
|   Overview:   Overview: Cristopher ring 2.16.18 ([place]) for severe                       |
| MR. POLY left. CABG too, SVG x 2 to OM and RCA.Last Assessment &                        |
| Plan: Cristopher ring 2.16.18 ([place]) for severe MR. POLY left. CABG                      |
|  too, SVG x 2 to OM and RCA.                                                           |
|Cristopher ring 2.16.18 ([place]) for severe MR. POLY left. CABG too, SVG x 2 to OM and RCA. |
+----------------------------------------------------------------------------------------+
 
 
 
+----------------------------------+------------+
| Osteomyelitis of left foot (HCC) | 2017 |
+----------------------------------+------------+
 
 
 
+-------------------------------------------------------------------+
|   Overview:   Added automatically from request for surgery [id number] |
+-------------------------------------------------------------------+
 
 
 
+-----------------------------------------------------+------------+
| Foot ulcer due to DM  ([facility])                         | 2017 |
+-----------------------------------------------------+------------+
| Severe protein-calorie malnutrition (HCC)           | 2017 |
+-----------------------------------------------------+------------+
| Loss of weight                                      | 2017 |
+-----------------------------------------------------+------------+
| Dysphagia, unspecified                              | 2017 |
+-----------------------------------------------------+------------+
| Diabetic foot ulcer (HCC)                           | 2017 |
+-----------------------------------------------------+------------+
| Dyslipidemia                                        | 2017 |
+-----------------------------------------------------+------------+
| Gastroesophageal reflux disease without esophagitis | 2017 |
+-----------------------------------------------------+------------+
| Hypertension, essential                             | 2016 |
+-----------------------------------------------------+------------+
 
 
 
+----------------------------------------------------+
|   Overview:   Last Assessment & Plan:              |
| Will resume metoprolol and losartan as BP allows.  |
+----------------------------------------------------+
 
 
 
+--------------------------------------+------------+
| ESRD (end stage renal disease) ([facility]) | 2016 |
+--------------------------------------+------------+
 
 
 
+-------------------------------------------------------------------+
|   Overview:   Primary nephrologist is Dr. Asher. She was on PD    |
| and eventually converted to HD and is dialyzed TTS using left UE  |
 
| AVF at [facility].                                          |
+-------------------------------------------------------------------+
 
 
 
+------------------------------------------------------------------+------------+
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
| dialysis, with long-term current use of insulin (HCC)            |            |
+------------------------------------------------------------------+------------+
| Anemia in ESRD (end-stage renal disease) (HCC)                   | 2016 |
+------------------------------------------------------------------+------------+
| Proteinuria                                                      | 2011 |
+------------------------------------------------------------------+------------+
| Vitamin D deficiency                                             | 2011 |
+------------------------------------------------------------------+------------+
| Secondary hyperparathyroidism (HCC)                              | 2011 |
+------------------------------------------------------------------+------------+
| Recurrent UTI                                                    | 2011 |
+------------------------------------------------------------------+------------+
| Coronary artery disease involving native coronary artery of      | 2011 |
| native heart without angina pectoris                             |            |
+------------------------------------------------------------------+------------+
 
 
 
+-------------------------------------------------------------------+
|   Overview:   Overview: [facility] on 2012 shows totally occluded   |
| right coronary artery with collateral filling from the left, no   |
| significant stenoses within the left anterior descending artery   |
| and circumflex artery. Patent stents within the left anterior     |
| descending artery, normal intracardiac pressure, mild narrowing   |
| within the distal aorta and iliac arteries. - Sherie Bartholomew,   |
| MDEchocardiogram on 2017 shows Study: a 2-dimensional        |
| transthoracic echocardiogram with m-mode, spectral and color flow |
|  Doppler was performed, left ventricular systolic function is     |
| low-normal with, an EF between 50 - 55 %, the left ventricle      |
| cavity size is normal, the RV is normal in size and function, the |
|  left atrium is normal in size, the right atrium is normal in     |
| size, aortic valve is trileaflet and is mildly thickened, there   |
| is mild aortic regurgitation, there is no evidence of aortic      |
| stenosis, the mitral valve is normal. - OLIVA Huffman |
|  on 2017 shows severe triple-vessel coronary artery disease   |
| with moderate ostial left anterior descending artery and patent   |
| stent in the midsegment, moderate stenosis in the second obtuse   |
| marginal branch and total occlusion of the proximal right         |
| coronary artery, which is known from before, given the patient's  |
| symptoms at this time, recommendation given. The patient          |
| understands. We will proceed with further medical management with |
|  blood pressure control. Also, we will optimize our blood         |
| pressure management. Further evaluation for the ostial left       |
| anterior descending artery and circumflex lesion upon recurrent   |
| symptoms for the patient, the patient will be evaluated for any   |
| further symptoms and fractional flow reserve of the left anterior |
|  descending artery and possible intervention on the left          |
| circumflex system will be considered. - OLIVA Diane   |
| on 2017 shows severe 3-vessel coronary artery disease with   |
| moderate to severe proximal left anterior descending with         |
| significant fractional flow reserve value of 0.77, severe mid     |
| large marginal branch, and chronically occluded right coronary    |
| artery with left-to-right collaterals, normal left ventricular    |
 
| end-diastolic pressure. - OLIVA Lambert on 2017     |
| shows three-vessel coronary artery disease with a chronically     |
| occluded right coronary artery with left-to-right collaterals,    |
| severe proximal left anterior descending artery with a patent     |
| stent in the mid left anterior descending artery and a            |
| significant FFR value of 0.77 during diagnostic angiogram on      |
| 2017, and severe disease of the second marginal branch |
|  of the left circumflex artery, successful PTCA and stenting of   |
| the second marginal branch of the left circumflex artery using a  |
| 2.25 x 16 and a 2.25 x 8 mm overlapping synergy drug-eluting      |
| stents postdilated using a 2.25 noncompliant balloon, successful  |
| direct stenting of the proximal and ostium of the left anterior   |
| descending artery using a 3.5 x 16 mm Synergy drug-eluting stent  |
| postdilated using a 3.5 noncompliant balloon. - Marcos Gamboa, |
|  MDEchocardiogram on 2018 shows the left ventricle is normal |
|  in size, wall thickness and moderately impaired systolic         |
| function EF 35-40%. Inferior, inferolateral and anterolateral     |
| hypokinesis, the right ventricle is normal in size and function,  |
| severe mitral regurgitation with moderately dilated left atrium,  |
| mild tricuspid regurgitation and mild pulmonary hypertension RVSP |
|  48 mmHg, there is no pericardial effusion, new wall motion       |
| abnormalities and new severe mitral regurgitation compared to     |
| prior study. - Celestino Porras MDL[facility] on 2018 shows         |
| three-vessel coronary artery disease with widely patent stent in  |
| the left anterior descending artery and severe stent restenosis   |
| in the marginal branch, occluded right coronary artery with       |
| collateral from left to right, severe left ventricular            |
| dysfunction with severe mitral regurgitation, status post         |
| percutaneous transluminal coronary angioplasty of in-stent        |
| restenosis within the marginal branch, recommend consideration    |
| for mitral valve replacement and 2-vessel coronary artery bypass  |
| surgery to the right coronary artery and marginal branch. - Iyad  |
| MD DustinTEE and CABG x2 on 2018 shows Severely reduced LV  |
| systolic function. Visually estimated LVEF 25%, normal LV size    |
| and shape. Normal wall thickness, normal RV size with normal      |
| function, LA enlarged. POLY normal size without SEC, masses, or    |
| thrombi. IAS intact, no PFO detected, mitral valve with bileaflet |
|  thickening and severely restricted motion, associated with       |
| ventricular tethering. Severe functional MR with central jet,     |
| trileaflet aortic valve with mild sclerosis. No significant       |
| aortic stenosis. Mild AI with central jet, tricuspid valve normal |
|  structure and function. Trace TR, pulmonic valve normal Trace    |
| MO, visible portions of ascending aorta and arch normal. Grade II |
|  atherosclerotic disease of descending aorta, pulmonary artery    |
| normal, normal pericardium. No pericardial or pleural effusions,  |
| left ventricular function slightly improved and right ventricular |
|  function unchanged compared to preop,  28 mm mitral valve        |
| annuloplasty ring is well-seated with an acceptable inflow        |
| gradient across the mitral valve and no MR noted, no change in    |
| the aortic, tricuspid, or pulmonic valves compared to preop, no   |
| change in visible portions of aorta after decannulation, LV and   |
| RV function unchanged after sternal closure. - Jagjit Hickey,       |
| MDEchocardiogram on 3/7/2018 shows a complete two-dimensional     |
| transthoracic echocardiogram was performed (2D, M-mode, Doppler   |
| and color flow Doppler), left ventricular systolic function is    |
| severely reduced, the visually estimated LV ejection fraction is  |
| 15%, the left and right atrial chambers are both normal in size,  |
| there is mild mitral regurgitation, an annuloplasty ring is noted |
|  in the mitral position, there is mild tricuspid regurgitation,   |
| Estimated PA pressure is 42 mmHg, the aortic valve leaflets       |
 
| appear mildly calcified, the aortic valve opens well, the aortic  |
| valve mean systolic gradient was measured at 9 mm Hg. - Star    |
| Missy Pino Assessment & Plan: [facility] in [facility] 5 months     |
| ago. 1 week off Plavix for CAGG/MVr surgery 2.16. Now and in the  |
| past with PAFAlso statin - data in dialysis patients for primary  |
| prevention with statin is not beneficial but this is secondary    |
| prevention. However, instead of high dose statin, moderate dose   |
| due to ESRD with slight increased risk of rhabdo. - Warfarin -    |
| ASA - Moderate dose Statin                                        |
+-------------------------------------------------------------------+
 
 
 
+------------+------------+
| Depression | 2011 |
+------------+------------+
 
 
 
 Resolved Problems
 
 
+----------------------------------------------------------------+----------+-----------+
| Problem                                                        | Noted    | Resolved  |
|                                                                | Date     | Date      |
+----------------------------------------------------------------+----------+-----------+
| Chest pain                                                     | 20 |  |
|                                                                | 19       | 9         |
+----------------------------------------------------------------+----------+-----------+
| Chest pain                                                     | 20 |  |
|                                                                | 18       | 8         |
+----------------------------------------------------------------+----------+-----------+
| Severe mitral regurgitation                                    | 20 |  |
|                                                                | 18       | 8         |
+----------------------------------------------------------------+----------+-----------+
| Precordial pain                                                | 20 |  |
|                                                                | 18       | 8         |
+----------------------------------------------------------------+----------+-----------+
| NSTEMI (non-ST elevated myocardial infarction) (HCC)           | 20 |  |
|                                                                | 17       | 8         |
+----------------------------------------------------------------+----------+-----------+
| Nausea and vomiting                                            | 20 |  |
|                                                                | 17       | 7         |
+----------------------------------------------------------------+----------+-----------+
| Abdominal pain, left upper quadrant                            | 20 |  |
|                                                                | 17       | 7         |
+----------------------------------------------------------------+----------+-----------+
| Partial small bowel obstruction (HCC)                          | 20 |  |
|                                                                | 17       | 7         |
+----------------------------------------------------------------+----------+-----------+
| Gastroenteritis                                                | 20 |  |
|                                                                | 17       | 7         |
+----------------------------------------------------------------+----------+-----------+
| Transient alteration of awareness                              | 20 |  |
|                                                                | 17       | 7         |
+----------------------------------------------------------------+----------+-----------+
| Pain of left hip joint                                         | 20 |  |
|                                                                | 17       | 7         |
+----------------------------------------------------------------+----------+-----------+
| Prolonged Q-T interval on ECG                                  | 20 |  |
 
|                                                                | 17       | 7         |
+----------------------------------------------------------------+----------+-----------+
| Hyperphosphatemia                                              | 20 |  |
|                                                                | 16       | 7         |
+----------------------------------------------------------------+----------+-----------+
| Fracture of left hip due to osteoporosis (HCC)                 | 20 |  |
|                                                                | 16       | 7         |
+----------------------------------------------------------------+----------+-----------+
| Closed fracture of base of fifth metatarsal bone of right foot | 20 |  |
|                                                                | 16       | 7         |
+----------------------------------------------------------------+----------+-----------+
| ESRD on peritoneal dialysis                                    | 20 |  |
|                                                                | 16       | 7         |
+----------------------------------------------------------------+----------+-----------+
| Acute abdominal pain                                           | 20 |  |
|                                                                | 16       | 7         |
+----------------------------------------------------------------+----------+-----------+
| Blood per rectum                                               | 20 |  |
|                                                                | 16       | 7         |
+----------------------------------------------------------------+----------+-----------+
| CKD (chronic kidney disease), stage V                          | 20 |  |
|                                                                | 11       | 6         |
+----------------------------------------------------------------+----------+-----------+
| Chronic diarrhea                                               | 20 |  |
|                                                                | 11       | 7         |
+----------------------------------------------------------------+----------+-----------+
 
 
 
+-------------------------+
|   Overview:   Resolved. |
+-------------------------+
 
 
 
+--------------------+----------+-----------+
| Stool incontinence | 20 |  |
|                    | 11       | 8         |
+--------------------+----------+-----------+
 
 
 
+-------------------------+
|   Overview:   Resolved. |
+-------------------------+
 
 
 
 Encounters
 
 
+--------+-------------+-----------+----------------------+----------------------+
| Date   | Type        | Specialty | Care Team            | Description          |
+--------+-------------+-----------+----------------------+----------------------+
| / | Documentati |           |   See, Medical       |                      |
| 2019   | on Only     |           | Record               |                      |
+--------+-------------+-----------+----------------------+----------------------+
| / | Documentati |           |   Luisa Segovia   | Paperwork ([facility]   |
2019   | on Only     |           | [facility]                | [facility]  |
|        |             |           |                      | & Orthotics)         |
 
+--------+-------------+-----------+----------------------+----------------------+
| 04/10/ | Documentati |           |   João Asher MD  | Other (   | on Only     |           |                      [facility] |
|        |             |           |                      |  Dialysis Rounding   |
|        |             |           |                      | Note)                |
+--------+-------------+-----------+----------------------+----------------------+
| 04/10/ | Telephone   |           |   Srinivasan Abdi,  | Follow-up            |
2019   |             |           | DPM                  | (reschedule 04/10 )  |
+--------+-------------+-----------+----------------------+----------------------+
| 04/10/ | Orders Only |           |   Luisa Segovia   |                      |
2019   |             |           | CHELSEY TAN                |                      |
+--------+-------------+-----------+----------------------+----------------------+
| / | Telephone   |           |   Srinivasan Abdi,  | Other (Requesting    |
|    |             |           | DPM                  | status on form for   |
|        |             |           |                      | shoe order)          |
+--------+-------------+-----------+----------------------+----------------------+
| / | Documentati |           |   João Asher MD  | Other (   | on Only     |           |                      | [facility] |
|        |             |           |                      |  Dialysis Rounding   |
|        |             |           |                      | Note)                |
+--------+-------------+-----------+----------------------+----------------------+
| / | Office      |           |   Srinivasan Abdi,  | Closed nondisplaced  |
2019   | Visit       |           | DPM                  | fracture of distal   |
|        |             |           |                      | phalanx of right     |
|        |             |           |                      | great toe with       |
|        |             |           |                      | routine healing,     |
|        |             |           |                      | subsequent encounter |
|        |             |           |                      |  (Primary Dx);       |
|        |             |           |                      | Post-operative       |
|        |             |           |                      | state; Open wound of |
|        |             |           |                      |  toe, subsequent     |
|        |             |           |                      | encounter            |
+--------+-------------+-----------+----------------------+----------------------+
| / | Documentati |           |   Peabody, Jessica   | Akin Jeong        |
|    | on Only     |           | NAHUN SPARKS                | medical records      |
|        |             |           |                      | request)             |
+--------+-------------+-----------+----------------------+----------------------+
| / | Office      |           |   Kirt Mclaughlin MD     | Steal syndrome as    |
|    | Visit       |           |                      | complication of      |
|        |             |           |                      | dialysis access,     |
|        |             |           |                      | sequela (Primary     |
|        |             |           |                      | Dx); ESRD (end stage |
|        |             |           |                      |  renal disease)      |
|        |             |           |                      | (HCC); PAD           |
|        |             |           |                      | (peripheral artery   |
|        |             |           |                      | disease) ([facility] within [facility])       |
+--------+-------------+-----------+----------------------+----------------------+
| / | Orders Only |           |   Dionne Danielson MA  |                      |
|    |             |           |                      |                      |
+--------+-------------+-----------+----------------------+----------------------+
| / | Office      |           |   Srinivasan Abdi,  | Closed nondisplaced  |
|    | Visit       |           | DPM                  | fracture of distal   |
|        |             |           |                      | phalanx of right     |
|        |             |           |                      | great toe with       |
|        |             |           |                      | routine healing,     |
|        |             |           |                      | subsequent encounter |
|        |             |           |                      |  (Primary Dx);       |
|        |             |           |                      | Post-operative       |
|        |             |           |                      | state; Toe pain,     |
|        |             |           |                      | right                |
 
+--------+-------------+-----------+----------------------+----------------------+
| 02/15/ | Documentati |           |   João Asher MD  | Other (January       |
2019   | on Only     |           |                      | [facility] |
|        |             |           |                      |  Dialysis Rounding   |
|        |             |           |                      | Note)                |
+--------+-------------+-----------+----------------------+----------------------+
| / | Emergency   |           |   Cookson, Rachel M, | Fall in home,        |
| 2019 - |             |           |  DO Raya,       | initial encounter    |
|        |             |           | MD Ginny            | (Primary Dx);        |
| 02/15/ |             |           | Baltazar Isidro,   | Cellulitis of right  |
|    |             |           | MD                   | toe; Closed          |
|        |             |           |                      | displaced fracture   |
|        |             |           |                      | of distal phalanx of |
|        |             |           |                      |  right great toe,    |
|        |             |           |                      | initial encounter;   |
|        |             |           |                      | Generalized          |
|        |             |           |                      | weakness; Fatigue,   |
|        |             |           |                      | unspecified type     |
+--------+-------------+-----------+----------------------+----------------------+
 
 
 
+---+-------------+
|   |   Discharge |
|   |  Summaries  |
|   | -           |
|   | Sanna |
|   |  MD Baltazar  |
|   | -           |
|   | 02/15/2019  |
|   |  2:09 PM    |
|   | PST         |
|   | Formatting  |
|   | of this     |
|   | note may be |
|   |  different  |
|   | from the    |
|   | original.Pa |
|   | tient:      |
|   | Cherie       |
|   | Cinthya     |
|   | Wilfredo      |
|   |     :    |
|   | 1950    |
|   |   MRN:      |
|   | [id number] |
|   | te of       |
|   | Admission:  |
|   |  2019  |
|   |  Date of    |
|   | Discharge:  |
|   |  2/15/2019  |
|   |   Treatment |
|   |  Team:      |
|   | Consulting  |
|   | Physician:  |
|   | Srinivasan L     |
|   | Abdi,    |
|   | DPMConsulti |
|   | ng          |
 
|   | Physician:  |
|   | Andrés Montes De Ocaa, |
|   |             |
|   | MDConsultin |
|   | g           |
|   | Physician:  |
|   | João H      |
|   | Akoum,      |
|   | MDAdmitting |
|   |  Provider:  |
|   | Tuhin       |
|   | Dorota,   |
|   | MDDischargi |
|   | ng          |
|   | Provider:   |
|   | BALTAZAR       |
|   | Renetta ISIDRO
|   |             |
|   | MDDischarge |
|   |  Diagnoses: |
|   |  Principal  |
|   | Problem:    |
|   | Falls       |
|   | frequentlyA |
|   | ctive       |
|   | Problems:   |
|   | Depression  |
|   |  ESRD (end  |
|   | stage renal |
|   |  disease)   |
|   | (HCC)  Type |
|   |  2 diabetes |
|   |  mellitus   |
|   | with        |
|   | chronic     |
|   | kidney      |
|   | disease on  |
|   | chronic     |
|   | dialysis,   |
|   | with        |
|   | long-term   |
|   | current use |
|   |  of insulin |
|   |  (HCC)      |
|   | Anemia in   |
|   | ESRD        |
|   | (end-stage  |
|   | renal       |
|   | disease)    |
|   | (HCC)       |
|   | Hypertensio |
|   | n,          |
|   | essential   |
|   | Dyslipidemi |
|   | a           |
|   | Gastroesoph |
|   | ageal       |
|   | reflux      |
|   | disease     |
|   | without     |
 
|   | esophagitis |
|   |   Loss of   |
|   | weight      |
|   | Severe      |
|   | protein-karen |
|   | orie        |
|   | malnutritio |
|   | n (HCC)     |
|   | Chronic     |
|   | systolic    |
|   | congestive  |
|   | heart       |
|   | failure     |
|   | (HCC)  COPD |
|   |  (chronic   |
|   | obstructive |
|   |  pulmonary  |
|   | disease)    |
|   | (HCC)  ICD  |
|   | (implantabl |
|   | e           |
|   | cardioverte |
|   | r-defibrill |
|   | ator) in    |
|   | place       |
|   | Paroxysmal  |
|   | atrial      |
|   | fibrillatio |
|   | n (HCC)     |
|   | S/P CABG x  |
|   | 2  S/P      |
|   | mitral      |
|   | valve       |
|   | repair  PVD |
|   |             |
|   | (peripheral |
|   |  vascular   |
|   | disease)    |
|   | (HCC)  CAD  |
|   | (coronary   |
|   | artery      |
|   | disease)    |
|   | Macrocytosi |
|   | s  Lactic   |
|   | acidosis    |
|   | Restless    |
|   | legs        |
|   | syndrome    |
|   | Overactive  |
|   | bladderReso |
|   | lved        |
|   | Problems:   |
|   | * No        |
|   | resolved    |
|   | hospital    |
|   | problems. * |
|   |             |
|   | Procedures  |
|   | Performed:C |
|   | hief        |
 
|   | Complaint:R |
|   | eferral     |
|   | (Dr. Elliott)  |
|   | and Skin    |
|   | Complaint   |
|   | (right foot |
|   |  )Hospital  |
|   | Course:     |
|   | Frequent    |
|   | falls:      |
|   | Assessment: |
|   |   It was    |
|   | unclear     |
|   | initially   |
|   | The exact   |
|   | culprit and |
|   |  it was     |
|   | felt        |
|   | potentially |
|   |  she was a  |
|   | bit dry (as |
|   |  per        |
|   | nephrology  |
|   | who saw her |
|   |  this       |
|   | admission)  |
|   | as she had  |
|   | just had    |
|   | ultrafiltra |
|   | tion with   |
|   | HD, versus  |
|   | other       |
|   | culprit.    |
|   | However the |
|   |  patient    |
|   | did state   |
|   | that when   |
|   | she has     |
|   | recently    |
|   | fallen it   |
|   | was in the  |
|   | setting of  |
|   | not using   |
|   | her walker  |
|   | as she had  |
|   | been        |
|   | instructed  |
|   | to as she   |
|   | was only    |
|   | going a     |
|   | very short  |
|   | distance    |
|   | and felt    |
|   | she did not |
|   |  need it.   |
|   | She will be |
|   |  very       |
|   | diligent    |
|   | about using |
|   |  her walker |
 
|   |  and taking |
|   |  all needed |
|   |             |
|   | precautions |
|   | .  Coronary |
|   |  artery     |
|   | disease     |
|   | with        |
|   | history of  |
|   | CABG,       |
|   | peripheral  |
|   | vascular    |
|   | disease,    |
|   | hypertensio |
|   | n and       |
|   | hyperlipide |
|   | mark with    |
|   | history of  |
|   | paroxysmal  |
|   | atrial      |
|   | fibrillatio |
|   | n on        |
|   | anticoagula |
|   | tion:.      |
|   | Will resume |
|   |  PTA meds   |
|   | as below on |
|   |  d/c        |
|   | Overactive  |
|   | bladder:    |
|   | Continue    |
|   | oxybutynin. |
|   |   Requip    |
|   | for         |
|   | restless    |
|   | leg         |
|   | syndrome.   |
|   | Anxiety     |
|   | depression: |
|   |             |
|   | recommended |
|   |  to Refrain |
|   |  from using |
|   |             |
|   | benzodiazep |
|   | inesas much |
|   |  as         |
|   | possible    |
|   | and per d/w |
|   |  nephrology |
|   |  will       |
|   | decrease    |
|   | the ativan  |
|   | dose.       |
|   | Diabetes    |
|   | mellitus    |
|   | with        |
|   | diabetic    |
|   | nephropathy |
|   |  with       |
 
|   | end-stage   |
|   | renal       |
|   | disease on  |
|   | hemodialysi |
|   | s: Continue |
|   |  with       |
|   | current     |
|   | regimen for |
|   |  now,       |
|   | follow, and |
|   |  adjust as  |
|   | needed      |
|   | based on    |
|   | progress.   |
|   | F/u with    |
|   | outpatient  |
|   | providers   |
|   | With        |
|   | respect to  |
|   | her left    |
|   | foot prior  |
|   | injury and  |
|   | prior       |
|   | antibiotics |
|   | :           |
|   | Assessment: |
|   |   She was   |
|   | seen by ID  |
|   | here and    |
|   | recently    |
|   | had         |
|   | completed a |
|   |  course of  |
|   | antbx       |
|   | reportedly. |
|   |   They      |
|   | would like  |
|   | to have her |
|   |  off antbx  |
|   | for now and |
|   |  f/u        |
|   | withthem.   |
|   | Should f/u  |
|   | with her    |
|   | podiatrist  |
|   | dr abdi  |
|   | for her     |
|   | foot as     |
|   | well.       |
|   | Discharge   |
|   | Exam and    |
|   | Data:Vital  |
|   | Signs:BP    |
|   | 112/57 (BP  |
|   | Location:   |
|   | Right upper |
|   |  arm)  |    |
|   | Pulse 66  | |
|   |  Temp 98    |
|   |   F (36.7   |
 
|   |   C) (Oral) |
|   |   | Resp 18 |
|   |   | Ht      |
|   | 1.778 m (5' |
|   |  10")  | Wt |
|   |  65.3 kg    |
|   | (144 lb)  | |
|   |  SpO2 96%   |
|   | |           |
|   | Breastfeedi |
|   | ng? No  |   |
|   | BMI 20.66   |
|   | kg/m   I&O  |
|   | Last 3      |
|   | Shifts:  No |
|   |             |
|   | intake/outp |
|   | ut data     |
|   | recorded.Ph |
|   | ysical      |
|   | Examination |
|   | :           |
|   | Constitutio |
|   | nal: Alert  |
|   | and         |
|   | oriented to |
|   |  person,    |
|   | place, and  |
|   | time.       |
|   | Appears     |
|   | well-develo |
|   | ped and     |
|   | well-nouris |
|   | hed. HEENT: |
|   |  Neck       |
|   | supple, no  |
|   | JVD, non    |
|   | icteric     |
|   | sclera.Card |
|   | iovascular: |
|   |  Normal     |
|   | rate,       |
|   | regular     |
|   | rhythm,     |
|   | normal      |
|   | heart       |
|   | sounds, and |
|   |  intact     |
|   | distal      |
|   | pulses.     |
|   | Exam        |
|   | reveals no  |
|   | appreciated |
|   |  gallop or  |
|   | friction    |
|   | rub.  No    |
|   | murmur      |
|   | heard.Pulmo |
|   | nary/Chest: |
|   |  Effort     |
 
|   | normal and  |
|   | breath      |
|   | sounds      |
|   | normal. No  |
|   | stridor. No |
|   |             |
|   | respiratory |
|   |  distress.  |
|   |  no         |
|   | wheezes. no |
|   |  rales.     |
|   | exhibits no |
|   |             |
|   | tenderness. |
|   |  Abdominal: |
|   |  Soft.      |
|   | Bowel       |
|   | sounds are  |
|   | normal.     |
|   | exhibits no |
|   |             |
|   | distension. |
|   |  There is   |
|   | no          |
|   | tenderness. |
|   |  There is   |
|   | no rebound  |
|   | and no      |
|   | guarding.   |
|   | Extremeties |
|   | /Musculoske |
|   | letal:      |
|   | Normal      |
|   | general     |
|   | range of    |
|   | motion.exhi |
|   | bits no     |
|   | tenderness. |
|   |   exhibits  |
|   | no edema.   |
|   | Left foot   |
|   | in boot     |
|   | wrapped in  |
|   | dressing,   |
|   | see wound   |
|   | care and ID |
|   |  eval .     |
|   | Neurologica |
|   | l:  Alert   |
|   | and         |
|   | oriented to |
|   |  person,    |
|   | place, and  |
|   | time. Has   |
|   | normal      |
|   | reflexes.   |
|   | No gross    |
|   | cranial     |
|   | nerve or    |
|   | focal       |
 
|   | deficit.    |
|   | Exhibits    |
|   | normal      |
|   | muscle      |
|   | tone.       |
|   | Coordinatio |
|   | n           |
|   | normal.Skin |
|   | : Skin is   |
|   | warm and    |
|   | dry. No     |
|   | rash noted. |
|   |  No         |
|   | erythema.   |
|   | No pallor.  |
|   | Psychiatric |
|   | : Has a     |
|   | normal mood |
|   |  and affect |
|   |  given      |
|   | situation.  |
|   | Behavior is |
|   |  normal.    |
|   | Judgment    |
|   | normal for  |
|   | patient.    |
|   | Recent Labs |
|   |  Recent     |
|   | LabsLab     |
|   |  |
|   | 9 WBC 5.14  |
|   | HGB 10.1*   |
|   | HCT 30.9*   |
|   | *    |
|   | Recent      |
|   | LabsLab     |
|   |  |
|   | 9  K  |
|   | 4.7 CL 96*  |
|   | CO2 >40*    |
|   | BUN 9       |
|   | CREATININE  |
|   | 2.96* No    |
|   | results for |
|   |  input(s):  |
|   | INR in the  |
|   | last 168    |
|   | hours.Resul |
|   | ts          |
|   | Procedure   |
|   | Component   |
|   | Value Units |
|   |  Date/Time  |
|   |  Blood      |
|   | Culture Set |
|   |  2          |
|   | [[id number]]  |
|   | Collected:  |
|   |  19   |
|   | 1739        |
 
|   | Specimen:   |
|   | Blood from  |
|   | Blood,      |
|   | peripheral  |
|   | draw        |
|   | Updated:    |
|   | 19    |
|   | 2005  Blood |
|   |  Culture    |
|   | Set 1       |
|   | [[id number]]  |
|   | Collected:  |
|   |  19   |
|   | 1655        |
|   | Specimen:   |
|   | Blood from  |
|   | Blood Line  |
|   | Draw        |
|   | Updated:    |
|   | 19    |
|   | 2004        |
|   | Recent      |
|   | Radiology   |
|   | ResultsXr   |
|   | Toe Right 2 |
|   |  ViewResult |
|   |  Date:      |
|   | 2019No |
|   |             |
|   | radiographi |
|   | c evidence  |
|   | of          |
|   | osteomyelit |
|   | is seen.    |
|   | If further  |
|   | assessment  |
|   | is          |
|   | warranted,  |
|   | consider    |
|   | correlation |
|   |  with MRI.  |
|   | Potential   |
|   | acute       |
|   | fracture    |
|   | through the |
|   |  base of    |
|   | the distal  |
|   | phalanx.    |
|   | Signed by:  |
|   | Habbu, Gavin |
|   |  Sign       |
|   | Date/Time:  |
|   | 2019  |
|   | 5:15        |
|   | PMOutstandi |
|   | ng Issues:  |
|   |  F/u with   |
|   | podiatry,   |
|   | ID, PCP and |
|   |  resume HD. |
 
|   |   Discharge |
|   |             |
|   | Information |
|   | :Follow     |
|   | up:[facility]   |
|   | Couch,      |
|   |  [address]     |
|   | [address],   |
|   | [place]         |
|   | [address] |
|   | [place]        |
|   | [phone] |
|   | [phone] |
|   | ule an      |
|   | appointment |
|   |  as soon as |
|   |  possible   |
|   | for a       |
|   | visitBrian  |
|   | L Abdi,  |
|   | [id number]      |
|   | [address], |
|   |  [place]        |
|   | [address] |
|   |  [place]         |
|   | [id number] |
|   | [phone] |
|   | ule an      |
|   | appointment |
|   |  as soon as |
|   |  possible   |
|   | for a       |
|   | visitplease |
|   |  continue   |
|   | prior to    |
|   | admission   |
|   | planJimmy   |
|   | Earl,       |
|   | [id number]    |
|   | [address]  |
|   | [address] |
|   | [place]       |
|   | [phone] |
|   | [phone] |
|   | lease call  |
|   | to see when |
|   |  they would |
|   |  like to    |
|   | see you in  |
|   | follow up   |
|   | resume care |
|   |  with all   |
|   | providers   |
|   | you were    |
|   | seeing      |
|   | prior to    |
|   | admission   |
|   | Medication  |
|   | List        |
|   | CHANGE how  |
 
|   | you take    |
|   | these       |
|   | medications |
|   |   LORazepam |
|   |  1 MG       |
|   | tabletRefil |
|   | ls:         |
|   | 0Commonly   |
|   | known as:   |
|   | ATIVANTake  |
|   | 0.5 tablets |
|   |  by mouth   |
|   | every 8     |
|   | (eight)     |
|   | hours as    |
|   | needed for  |
|   | Anxiety.    |
|   | Patient     |
|   | states she  |
|   | has the 1mg |
|   |  tablets at |
|   |  home and   |
|   | that they   |
|   | are scored  |
|   | and she     |
|   | will split  |
|   | them in     |
|   | halfWhat    |
|   | changed:    |
|   | how much to |
|   |  take       |
|   | additional  |
|   | instruction |
|   | s  CONTINUE |
|   |  taking     |
|   | these       |
|   | medications |
|   |   albuterol |
|   |  108 (90    |
|   | Base)       |
|   | MCG/ACT     |
|   | inhalerRefi |
|   | lls:        |
|   | 0Commonly   |
|   | known as:   |
|   | PROVENTIL   |
|   | HFA;VENTOLI |
|   | N HFA       |
|   | apixaban    |
|   | 2.5 MG      |
|   | tabletQTY:  |
|   |  60         |
|   | tabletRefil |
|   | ls:         |
|   | 0Commonly   |
|   | known as:   |
|   | ELIQUISTake |
|   |  1 tablet   |
|   | by mouth 2  |
|   | (two) times |
 
|   |  daily.     |
|   | atorvastati |
|   | n 80 MG     |
|   | tabletRefil |
|   | ls:         |
|   | 0Commonly   |
|   | known as:   |
|   | LIPITOR     |
|   | carvedilol  |
|   | 12.5 MG     |
|   | tabletQTY:  |
|   |  60         |
|   | tabletRefil |
|   | ls:         |
|   | 0Doctor's   |
|   | comments:   |
|   | Hold for    |
|   | SBP less    |
|   | than        |
|   | 100Hold for |
|   |  HR less    |
|   | than        |
|   | 60Commonly  |
|   | known as:   |
|   | COREGTake 1 |
|   |  tablet by  |
|   | mouth 2     |
|   | (two) times |
|   |  daily with |
|   |  meals.     |
|   | esomeprazol |
|   | e 40 MG     |
|   | capsuleRefi |
|   | lls:        |
|   | 0Commonly   |
|   | known as:   |
|   | NEXIUM      |
|   | HYDROcodone |
|   | -acetaminop |
|   | hen 5-325   |
|   | MG per      |
|   | tabletQTY:  |
|   |  30         |
|   | tabletRefil |
|   | ls:         |
|   | 0Commonly   |
|   | known as:   |
|   | NORCOTake 1 |
|   |  tablet by  |
|   | mouth every |
|   |  4 (four)   |
|   | hours as    |
|   | needed.     |
|   | insulin     |
|   | aspart 100  |
|   | UNIT/ML     |
|   | injectionRe |
|   | fills:      |
|   | 0Commonly   |
|   | known as:   |
 
|   | NOVOLOG     |
|   | insulin     |
|   | degludec    |
|   | 100 UNIT/ML |
|   |             |
|   | injectionRe |
|   | fills:      |
|   | 0Commonly   |
|   | known as:   |
|   | TRESIBA     |
|   | isosorbide  |
|   | mononitrate |
|   |  60 MG 24   |
|   | hr          |
|   | tabletQTY:  |
|   |  30         |
|   | tabletRefil |
|   | ls:  1Take  |
|   | 1 tablet by |
|   |  mouth      |
|   | daily.      |
|   | losartan    |
|   | 100 MG      |
|   | tabletRefil |
|   | ls:         |
|   | 0Commonly   |
|   | known as:   |
|   | COZAAR      |
|   | nitroGLYCER |
|   | IN 0.4 MG   |
|   | SL          |
|   | tabletQTY:  |
|   |  30         |
|   | tabletRefil |
|   | ls:         |
|   | 0Doctor's   |
|   | comments:   |
|   | Hold for    |
|   | SBP less    |
|   | than        |
|   | 100Commonly |
|   |  known as:  |
|   |             |
|   | NITROSTATPl |
|   | ace 1       |
|   | tablet      |
|   | under the   |
|   | tongue      |
|   | every 5     |
|   | (five)      |
|   | minutes as  |
|   | needed for  |
|   | Chest pain. |
|   |             |
|   | nortriptyli |
|   | ne 25 MG    |
|   | capsuleRefi |
|   | lls:        |
|   | 0Commonly   |
|   | known as:   |
 
|   | PAMELOR     |
|   | ondansetron |
|   |  4 MG       |
|   | disintegrat |
|   | ing         |
|   | tabletRefil |
|   | ls:         |
|   | 0Commonly   |
|   | known as:   |
|   | ZOFRAN-ODT  |
|   | oxybutynin  |
|   | 5 MG        |
|   | tabletRefil |
|   | ls:         |
|   | 0Commonly   |
|   | known as:   |
|   | DITROPAN    |
|   | prochlorper |
|   | azine 5 MG  |
|   | tabletQTY:  |
|   |  60         |
|   | tabletRefil |
|   | ls:         |
|   | 11Doctor's  |
|   | comments:   |
|   | Please      |
|   | consider 90 |
|   |  day        |
|   | supplies to |
|   |  promote    |
|   | better      |
|   | adherenceTA |
|   | KE ONE      |
|   | TABLET BY   |
|   | MOUTH TWICE |
|   |  DAILY AS   |
|   | NEEDED FOR  |
|   | NAUSEA      |
|   | rOPINIRole  |
|   | 0.25 MG     |
|   | tabletRefil |
|   | ls:         |
|   | 0Commonly   |
|   | known as:   |
|   | REQUIP      |
|   | TRINTELLIX  |
|   | 20 MG       |
|   | TabsRefills |
|   | :  0Generic |
|   |  drug:      |
|   | Vortioxetin |
|   | e HBr  You  |
|   | might also  |
|   | be taking   |
|   | other       |
|   | medications |
|   |  not listed |
|   |  above. If  |
|   | you have    |
|   | questions   |
 
|   | about any   |
|   | of your     |
|   | other       |
|   | medications |
|   | , talk to   |
|   | the person  |
|   | who         |
|   | prescribed  |
|   | them or     |
|   | your        |
|   | Primary     |
|   | Care        |
|   | Provider.   |
|   |    Where to |
|   |  Get Your   |
|   | Medications |
|   |             |
|   | Information |
|   |  about      |
|   | where to    |
|   | get these   |
|   | medications |
|   |  is not yet |
|   |  available  |
|   |  Ask your   |
|   | nurse or    |
|   | doctor      |
|   | about these |
|   |             |
|   | medications |
|   |             |
|   | LORazepam 1 |
|   |  MG tablet  |
|   | Disposition |
|   | :           |
|   | HomeConditi |
|   | on:         |
|   | StableCode  |
|   | Status:     |
|   | Full        |
|   | CodeDischar |
|   | ge took 35  |
|   | minutes, to |
|   |  include    |
|   | final       |
|   | examination |
|   | ,           |
|   | discussion  |
|   | of          |
|   | admission,  |
|   | and         |
|   | preparation |
|   |  of         |
|   | prescriptio |
|   | ns,         |
|   | instruction |
|   | s for       |
|   | on-going    |
|   | care,       |
|   | follow-up   |
 
|   | and         |
|   | documentati |
|   | on of       |
|   | discharge   |
|   | summary.SCO |
|   | TT          |
|   | SANNA, |
|   |  MD2:09 PM  |
+---+-------------+
 
 
 
+--------+-------------+---+----------------------+----------------------+
| / | Emergency   |   |   Chau Deleon,  |                      |
|    |             |   | MD                   |                      |
+--------+-------------+---+----------------------+----------------------+
| / | Office      |   |   Kirt Mclaughlin MD     | PAD (peripheral      |
|    | Visit       |   |                      | artery disease)      |
|        |             |   |                      | ([facility]) (Primary Dx);  |
|        |             |   |                      | ESRD (end stage      |
|        |             |   |                      | renal disease) ([facility]) |
+--------+-------------+---+----------------------+----------------------+
| / | Telephone   |   |   Kristen Tam, |                      |
| 2019   |             |   |  RN                  |                      |
+--------+-------------+---+----------------------+----------------------+
| / | Telephone   |   |   Srinivasan Abdi,  | Follow-up            |
|    |             |   | DPM                  | (appointment)        |
+--------+-------------+---+----------------------+----------------------+
| / | Orders Only |   |   Luisa Segovia   |                      |
|    |             |   | CHELSEY TAN                |                      |
+--------+-------------+---+----------------------+----------------------+
| / | Hospital    |   |   Connie,        | PVD (peripheral      |
| 2019 - | Encounter   |   | MD Liat Jarrett, | vascular disease)    |
|        |             |   |  Mioz Porter MD    | ([facility]) (Primary Dx);  |
| / |             |   |                      | ESRD (end stage      |
| 2019   |             |   |                      | renal disease) on    |
|        |             |   |                      | dialysis ([facility]);      |
|        |             |   |                      | Anemia in ESRD       |
|        |             |   |                      | (end-stage renal     |
|        |             |   |                      | disease) ([facility]);      |
|        |             |   |                      | Hypoalbuminemia;     |
|        |             |   |                      | Electrolyte          |
|        |             |   |                      | imbalance risk       |
+--------+-------------+---+----------------------+----------------------+
 
 
 
+---+-------------+
|   |   Discharge |
|   |  Summaries  |
|   | - Lyn,  |
|   | Prudence,     |
|   | MD-R2 -     |
|   | 2019  |
|   |  1:04 PM    |
|   | PST         |
|   | Formatting  |
|   | of this     |
|   | note may be |
|   |  different  |
 
|   | from the    |
|   | original.[facility] |
|   | [facility]        |
|   | [facility]    |
|   | [facility]     |
|   | [facility] |
|   | ce:         |
|   | Hospitalist |
|   | Resident    |
|   | Discharge   |
|   | SummaryDate |
|   |  of         |
|   | Admission:  |
|   |             |
|   | 2019Da |
|   | te of       |
|   | Discharge:  |
|   |             |
|   | [address]/ |
|   | Disc |
|   | harge       |
|   | Provider:   |
|   | Prudence      |
|   | Nicholson,    |
|   | MD-H0Uepmom |
|   | ent Team:   |
|   | Consulting  |
|   | Physician:  |
|   | Srinivasan L     |
|   | Abdi,    |
|   | DPMConsulti |
|   | ng          |
|   | Physician:  |
|   | Ethan      |
|   | Delmi,     |
|   | MDAdmitting |
|   |  Provider:  |
|   | Luiza      |
|   | Collingham, |
|   |             |
|   | MDDischarge |
|   |  Diagnoses: |
|   |             |
|   | Principal   |
|   | Problem:    |
|   | PVD         |
|   | (peripheral |
|   |  vascular   |
|   | disease)    |
|   | (HCC)Active |
|   |  Problems:  |
|   |  ESRD (end  |
|   | stage renal |
|   |  disease)   |
|   | (HCC)  Type |
|   |  2 diabetes |
|   |  mellitus   |
|   | with        |
|   | chronic     |
|   | kidney      |
 
|   | disease on  |
|   | chronic     |
|   | dialysis,   |
|   | with        |
|   | long-term   |
|   | current use |
|   |  of insulin |
|   |  (HCC)      |
|   | Anemia in   |
|   | ESRD        |
|   | (end-stage  |
|   | renal       |
|   | disease)    |
|   | (HCC)       |
|   | Hypertensio |
|   | n,          |
|   | essential   |
|   | Chronic     |
|   | systolic    |
|   | congestive  |
|   | heart       |
|   | failure     |
|   | (HCC)  COPD |
|   |  (chronic   |
|   | obstructive |
|   |  pulmonary  |
|   | disease)    |
|   | (HCC)       |
|   | Paroxysmal  |
|   | atrial      |
|   | fibrillatio |
|   | n (HCC)     |
|   | CAD         |
|   | (coronary   |
|   | artery      |
|   | disease)Res |
|   | olved       |
|   | Problems:   |
|   | * No        |
|   | resolved    |
|   | hospital    |
|   | problems.   |
|   | *BRIEF      |
|   | HISTORY OF  |
|   | PRESENTATIO |
|   | N:          |
|   | Patient     |
|   | Summary:    |
|   | Per Dr.     |
|   | Collingham' |
|   | s H and P   |
|   | from        |
|   | 2019:  |
|   | '67 y/o F   |
|   | with h/o    |
|   | ESRD on HD  |
|   | T,TH,Sat    |
|   | ([facility]        |
|   | [place]),     |
|   | DM2, PAD    |
 
|   | s/p         |
|   | angioplasty |
|   |  of LLE and |
|   |             |
|   | transmetata |
|   | rsal        |
|   | amputation  |
|   | of left     |
|   | foot        |
|   | 18 by |
|   |  [facility]        |
|   | Abdi due |
|   |  to         |
|   | osteomyelit |
|   | is, CAD,    |
|   | s/p MI,     |
|   | CABG, AVR   |
|   | , HFrEF |
|   |  with last  |
|   | EF 20 to    |
|   | 25%, s/p    |
|   | AICD, AF on |
|   |  chronic    |
|   | anticoagula |
|   | tion who    |
|   | was sent to |
|   |  ED by   |
|   | [facility] for    |
|   | evaluation  |
|   | of right    |
|   | LE.         |
|   |   Patient   |
|   | reports     |
|   | that at HD  |
|   | yesterday   |
|   | it was      |
|   | noted that  |
|   | her right   |
|   | toes were   |
|   | red and     |
|   | dusky.      |
|   |   She went  |
|   | to see   |
|   | [facility] this   |
|   | morning and |
|   |  he was     |
|   | concerned   |
|   | that right  |
|   | toes could  |
|   | be ischemic |
|   |  or         |
|   | infected    |
|   | and         |
|   | referred to |
|   |  ED.        |
|   | 'HOSPITAL   |
|   | COURSE:     |
|   | Vascular    |
|   | surgery was |
|   |  consulted  |
 
|   | from the    |
|   | emergency   |
|   | department, |
|   |  they did a |
|   |  selective  |
|   | catheteriza |
|   | tion of the |
|   |  left       |
|   | common      |
|   | femoral     |
|   | artery and  |
|   | placed an   |
|   | SMA stent.  |
|   | The patient |
|   |  tolerated  |
|   | the         |
|   | procedure   |
|   | well.       |
|   | Podiatry    |
|   | was         |
|   | consulted,  |
|   | they        |
|   | recommended |
|   |  wound care |
|   |             |
|   | consultatio |
|   | n for the   |
|   | open wound  |
|   | on her left |
|   |  foot. They |
|   |  also       |
|   | stated that |
|   |  her right  |
|   | foot did    |
|   | not need a  |
|   | procedure   |
|   | for the     |
|   | toes at     |
|   | this time.  |
|   | Infectious  |
|   | disease is  |
|   | waiting for |
|   |  blood      |
|   | cultures    |
|   | and Gram    |
|   | stain and   |
|   | culture of  |
|   | the wound   |
|   | site to     |
|   | narrow her  |
|   | IV          |
|   | antibiotics |
|   | . She       |
|   | reported    |
|   | improved    |
|   | pain, no    |
|   | shortness   |
|   | of breath,  |
|   | no nausea   |
|   | and         |
 
|   | vomiting,   |
|   | she is      |
|   | making a    |
|   | small       |
|   | amount of   |
|   | urine and   |
|   | had a bowel |
|   |  movement   |
|   | today. She  |
|   | would like  |
|   | to go home. |
|   |  Physical   |
|   | therapy     |
|   | cleared her |
|   |  to go home |
|   |  with home  |
|   | assist.     |
|   | Nephrology  |
|   | was         |
|   | consulted   |
|   | and         |
|   | reported    |
|   | that they   |
|   | were        |
|   | amenable to |
|   |             |
|   | administeri |
|   | ng her IV   |
|   | antibiotics |
|   |  with       |
|   | dialysis.   |
|   | Infectious  |
|   | disease was |
|   |  consulted  |
|   | and agreed  |
|   | to this     |
|   | plan. Upon  |
|   | discharge   |
|   | the patient |
|   |  was        |
|   | eating,     |
|   | drinking,   |
|   | urinating,  |
|   | having      |
|   | bowel       |
|   | movements.s |
|   | he was not  |
|   | having      |
|   | fevers,     |
|   | nausea, or  |
|   | vomiting.   |
|   | No          |
|   | prescriptio |
|   | ns prior to |
|   |  admission. |
|   |  DISCHARGE  |
|   | EXAMVital   |
|   | Signs:BP    |
|   | 120/56 (BP  |
|   | Location:   |
 
|   | Right upper |
|   |  arm)  |    |
|   | Pulse 64  | |
|   |  Temp 97.5  |
|   |   F (36.4   |
|   |   C)        |
|   | (Axillary)  |
|   |  | Resp 18  |
|   |  | Ht 1.778 |
|   |  m (5' 10") |
|   |   | Wt 65.5 |
|   |  kg (144 lb |
|   |  6.4 oz)  | |
|   |  SpO2 97%   |
|   | |           |
|   | Breastfeedi |
|   | ng? No  |   |
|   | BMI 20.72   |
|   | kg/m        |
|   | Physical    |
|   | ExamGeneral |
|   |             |
|   | Appearance: |
|   |  Alert and  |
|   | cooperative |
|   | , sitting   |
|   | up in a     |
|   | chair by    |
|   | the bed and |
|   |  appears to |
|   |  be in no   |
|   | acute       |
|   | distress.   |
|   |   HEENNT:   |
|   | Normocephal |
|   | ic and      |
|   | atraumatic. |
|   |  Hearing    |
|   | grossly     |
|   | intact. No  |
|   | nasal       |
|   | discharge.  |
|   | No tracheal |
|   |  deviation. |
|   |             |
|   |   Cardiovas |
|   | cular:      |
|   | Rhythm and  |
|   | rate are    |
|   | regular.    |
|   | 2/6         |
|   | systolic    |
|   | murmur      |
|   | heard best  |
|   | over the    |
|   | left upper  |
|   | sternal     |
|   | border. No  |
|   | gallops or  |
|   | rubs        |
 
|   | appreciated |
|   | .           |
|   | Peripheral  |
|   | pulses 2+   |
|   | on the      |
|   | right. No   |
|   | lower       |
|   | extremity   |
|   | edema.  Pul |
|   | monary/Ches |
|   | t: Lungs    |
|   | are clear   |
|   | to          |
|   | auscultatio |
|   | n           |
|   | bilaterally |
|   | . Effort is |
|   |  normal.    |
|   | Normal      |
|   | breath      |
|   | sounds.     |
|   |   Abdominal |
|   | : Soft,     |
|   | nontender,  |
|   | nondistende |
|   | d.          |
|   | Normoactive |
|   |  bowel      |
|   | sounds. No  |
|   | guarding or |
|   |  rebound    |
|   | tenderness. |
|   |             |
|   |   Musculosk |
|   | eletal:     |
|   | Normal      |
|   | range of    |
|   | motion.     |
|   | Normal      |
|   | muscular    |
|   | development |
|   | . Patient   |
|   | with        |
|   | forefoot    |
|   | amputation  |
|   | on the      |
|   | left. Left  |
|   | foot        |
|   | wrapped in  |
|   | new         |
|   | dressing,   |
|   | C/D/I.      |
|   | Right foot  |
|   | with        |
|   | erythema    |
|   | over the    |
|   | great big   |
|   | toe and     |
|   | cyanosis    |
|   | over the    |
 
|   | second toe, |
|   |  improved   |
|   | from prior. |
|   |             |
|   | Neurologica |
|   | l: CN       |
|   | II-XII      |
|   | grossly     |
|   | intact.     |
|   | Oriented to |
|   |  person,    |
|   | place, and  |
|   | time.       |
|   |   Skin:     |
|   | Skin is     |
|   | warm and    |
|   | dry. No     |
|   | rash noted. |
|   |             |
|   |   Psychiatr |
|   | ic:The      |
|   | patient was |
|   |  able to    |
|   | demonstrate |
|   |  good       |
|   | judgement   |
|   | and reason, |
|   |  without    |
|   | hallucinati |
|   | ons,        |
|   | abnormal    |
|   | affect or   |
|   | abnormal    |
|   | behaviors   |
|   | during the  |
|   | examination |
|   | .           |
|   | DATARecent  |
|   | LabsLab     |
|   |  |
|   | 8           |
|   |  |
|   | 3           |
|   |  |
|   | 3 WBC 3.90  |
|   | 3.39* 5.53  |
|   | HGB 9.8*    |
|   | 9.5* 9.4*   |
|   | HCT 30.1*   |
|   | 29.2* 28.4* |
|   |  *   |
|   | 125* 147*   |
|   | NEUTOPHILPC |
|   | T  --       |
|   | 66.95  --   |
|   | MONOPCT  -- |
|   |   11.96  -- |
|   |   Recent    |
|   | LabsLab     |
|   |  |
 
|   | 8           |
|   | 45 |
|   | 3           |
|   |  |
|   | 3     |
|   | 140 139 K   |
|   | 3.9 4.2 4.0 |
|   |  CL 97* 101 |
|   |  100 CO2 31 |
|   |  28 29 BUN  |
|   | 13 26* 23   |
|   | CREATININE  |
|   | 4.8* 6.8*   |
|   | 6.1* PROT   |
|   | --   --     |
|   | 6.2*        |
|   | BILITOT  -- |
|   |    --  0.4  |
|   | ALT  --     |
|   | --  21 AST  |
|   |  --   --    |
|   | 24          |
|   | Phosphorus: |
|   |   Lab       |
|   | Results     |
|   | Component   |
|   | Value Date  |
|   |  PHOS 3.6   |
|   | 2019  |
|   | Invalid     |
|   | input(s):   |
|   | LABALBURece |
|   | nt LabsLab  |
|   |  |
|   | 8 MG 2.3    |
|   | Recent      |
|   | LabsLab     |
|   |  |
|   | 3 CKTOTAL   |
|   | 28*         |
|   | Intake/Outp |
|   | ut Summary  |
|   | (Last 24    |
|   | hours) at   |
|   | 19    |
|   | [id number]    |
|   | data filed  |
|   | at 19 |
|   |  0850 Gross |
|   |  per 24     |
|   | hour Intake |
|   |             |
|   |    380 ml   |
|   | Output      |
|   |             |
|   | 0 ml Net    |
|   |             |
|   | 380 ml      |
|   | Microbiolog |
|   | y: Blood    |
 
|   | cultures -  |
|   | NGTDRadiolo |
|   | gyUs Lower  |
|   | Extremty    |
|   | Arterial    |
|   | RightResult |
|   |  Date:      |
|   | . |
|   |  Right leg  |
|   | shows       |
|   | biphasic    |
|   | inflow with |
|   |  a greater  |
|   | than 50%    |
|   | stenosis at |
|   |  the origin |
|   |  of the     |
|   | superficial |
|   |  femoral    |
|   | artery      |
|   | (137-309).  |
|   |  Biphasic   |
|   | outflow. 2. |
|   |  Two vessel |
|   |  runoff to  |
|   | the right   |
|   | foot.       |
|   | Signed by:  |
|   | Iuliano,    |
|   | Edward Sign |
|   |  Date/Time: |
|   |  2019 |
|   |  3:11       |
|   | PMPLANDispo |
|   | sition:     |
|   | HomeConditi |
|   | on:         |
|   | StableCode  |
|   | Status:     |
|   | Full CodeNo |
|   |  discharge  |
|   | procedures  |
|   | on          |
|   | file.Follow |
|   |  up:[facility]  |
|   | [facility]      |
|   | V[facility]    |
|   | [id number] |
|   |  [facility]   |
|   | Dr Nhan      |
|   | [place]   |
|   | [place]  |
|   | [id number] |
|   | 9 |
|   | Follow up   |
|   | in 3        |
|   | week(s)[address] |
|   | li [address],   |
|   |  [address]     |
|   | [address],   |
 
|   | [place]         |
|   | [address] |
|   | [place]        |
|   | [id number] |
|   | [phone] |
|   | [address],    |
|   |  [address]     |
|   | [address],   |
|   | [place]         |
|   | [address] |
|   | [place]        |
|   | [id number] |
|   | [phone] 1  |
|   | weekJimmy   |
|   | [facility],       |
|   | [id number]    |
|   | [address]  |
|   | [address] |
|   | [place]       |
|   | [id number] |
|   | [phone]  |
|   | office for  |
|   | appointment |
|   |  Medication |
|   |  List       |
|   | START       |
|   | taking      |
|   | these       |
|   | medications |
|   |             |
|   | cefTAZidime |
|   |  2 g        |
|   | injectionQT |
|   | Y:  1       |
|   | eachRefills |
|   | :           |
|   | 0Commonly   |
|   | known as:   |
|   | FORTAZInjec |
|   | t 2 g into  |
|   | the muscle  |
|   | After       |
|   | Hemodialysi |
|   | s (see      |
|   | admin       |
|   | instruction |
|   | s) for 7    |
|   | days.       |
|   | vancomycin  |
|   | IVPBRefills |
|   | :           |
|   | 0Commonly   |
|   | known as:   |
|   | VANCOCINInj |
|   | ect 750 mg  |
|   | into the    |
|   | vein After  |
|   | Hemodialysi |
|   | s (see      |
|   | admin       |
 
|   | instruction |
|   | s) for 7    |
|   | days.       |
|   | Dilute in   |
|   | appropriate |
|   |  volume of  |
|   | IV fluid    |
|   | and give by |
|   |  slow IV    |
|   | infusion.   |
|   | CHANGE how  |
|   | you take    |
|   | these       |
|   | medications |
|   |   losartan  |
|   | 25 MG       |
|   | tabletQTY:  |
|   |  30         |
|   | tabletRefil |
|   | ls:         |
|   | 0Commonly   |
|   | known as:   |
|   | COZAARTake  |
|   | 1 tablet by |
|   |  mouth      |
|   | daily.What  |
|   | changed:    |
|   | how much to |
|   |  take       |
|   | CONTINUE    |
|   | taking      |
|   | these       |
|   | medications |
|   |   *         |
|   | albuterol   |
|   | 108 (90     |
|   | Base)       |
|   | MCG/ACT     |
|   | inhalerRefi |
|   | lls:        |
|   | 0Commonly   |
|   | known as:   |
|   | PROVENTIL   |
|   | HFA;VENTOLI |
|   | N HFA *     |
|   | VENTOLIN    |
|   |  (90 |
|   |  Base)      |
|   | MCG/ACT     |
|   | inhalerRefi |
|   | lls:        |
|   | 0Generic    |
|   | drug:       |
|   | albuterol   |
|   | apixaban    |
|   | 2.5 MG      |
|   | tabletQTY:  |
|   |  60         |
|   | tabletRefil |
|   | ls:         |
 
|   | 0Commonly   |
|   | known as:   |
|   | ELIQUISTake |
|   |  1 tablet   |
|   | by mouth 2  |
|   | (two) times |
|   |  daily.     |
|   | atorvastati |
|   | n 40 MG     |
|   | tabletQTY:  |
|   |  30         |
|   | tabletRefil |
|   | ls:         |
|   | 0Commonly   |
|   | known as:   |
|   | LIPITORTake |
|   |  1 tablet   |
|   | by mouth    |
|   | nightly.    |
|   | bisacodyl   |
|   | 10 MG       |
|   | suppository |
|   | Refills:    |
|   | 0Commonly   |
|   | known as:   |
|   | DULCOLAX    |
|   | calcium     |
|   | acetate 667 |
|   |  MG         |
|   | capsuleRefi |
|   | lls:        |
|   | 0Commonly   |
|   | known as:   |
|   | PHOSLO      |
|   | carvedilol  |
|   | 12.5 MG     |
|   | tabletQTY:  |
|   |  60         |
|   | tabletRefil |
|   | ls:         |
|   | 0Doctor's   |
|   | comments:   |
|   | Hold for    |
|   | SBP less    |
|   | than        |
|   | 100Hold for |
|   |  HR less    |
|   | than        |
|   | 60Commonly  |
|   | known as:   |
|   | COREGTake 1 |
|   |  tablet by  |
|   | mouth 2     |
|   | (two) times |
|   |  daily with |
|   |  meals.     |
|   | clopidogrel |
|   |  75 MG      |
|   | tabletQTY:  |
|   |  30         |
 
|   | tabletRefil |
|   | ls:         |
|   | 11Commonly  |
|   | known as:   |
|   | PLAVIXTake  |
|   | 1 tablet by |
|   |  mouth      |
|   | daily.      |
|   | esomeprazol |
|   | e 20 MG     |
|   | capsuleRefi |
|   | lls:        |
|   | 0Commonly   |
|   | known as:   |
|   | NEXIUM      |
|   | HYDROcodone |
|   | -acetaminop |
|   | hen 5-325   |
|   | MG per      |
|   | tabletQTY:  |
|   |  30         |
|   | tabletRefil |
|   | ls:         |
|   | 0Commonly   |
|   | known as:   |
|   | NORCOTake 1 |
|   |  tablet by  |
|   | mouth every |
|   |  4 (four)   |
|   | hours as    |
|   | needed.     |
|   | insulin     |
|   | aspart 100  |
|   | UNIT/ML     |
|   | injectionRe |
|   | fills:      |
|   | 0Commonly   |
|   | known as:   |
|   | NOVOLOG     |
|   | insulin     |
|   | degludec    |
|   | 100 UNIT/ML |
|   |             |
|   | injectionRe |
|   | fills:      |
|   | 0Commonly   |
|   | known as:   |
|   | TRESIBA     |
|   | isosorbide  |
|   | mononitrate |
|   |  60 MG 24   |
|   | hr          |
|   | tabletQTY:  |
|   |  30         |
|   | tabletRefil |
|   | ls:  1Take  |
|   | 1 tablet by |
|   |  mouth      |
|   | daily.      |
|   | loperamide  |
 
|   | 2 MG        |
|   | capsuleRefi |
|   | lls:        |
|   | 0Commonly   |
|   | known as:   |
|   | IMODIUM     |
|   | LORazepam 1 |
|   |  MG         |
|   | tabletQTY:  |
|   |  30         |
|   | tabletRefil |
|   | ls:         |
|   | 0Commonly   |
|   | known as:   |
|   | ATIVANTake  |
|   | 1 tablet by |
|   |  mouth      |
|   | every 8     |
|   | (eight)     |
|   | hours as    |
|   | needed for  |
|   | Anxiety.    |
|   | nitroGLYCER |
|   | IN 0.4 MG   |
|   | SL          |
|   | tabletQTY:  |
|   |  30         |
|   | tabletRefil |
|   | ls:         |
|   | 0Doctor's   |
|   | comments:   |
|   | Hold for    |
|   | SBP less    |
|   | than        |
|   | 100Commonly |
|   |  known as:  |
|   |             |
|   | NITROSTATPl |
|   | ace 1       |
|   | tablet      |
|   | under the   |
|   | tongue      |
|   | every 5     |
|   | (five)      |
|   | minutes as  |
|   | needed for  |
|   | Chest pain. |
|   |             |
|   | nortriptyli |
|   | ne 25 MG    |
|   | capsuleRefi |
|   | lls:        |
|   | 0Commonly   |
|   | known as:   |
|   | PAMELOR     |
|   | ondansetron |
|   |  4 MG       |
|   | disintegrat |
|   | ing         |
|   | tabletRefil |
 
|   | ls:         |
|   | 0Commonly   |
|   | known as:   |
|   | ZOFRAN-ODT  |
|   | oxybutynin  |
|   | 5 MG        |
|   | tabletRefil |
|   | ls:         |
|   | 0Commonly   |
|   | known as:   |
|   | DITROPAN    |
|   | prochlorper |
|   | azine 5 MG  |
|   | tabletQTY:  |
|   |  60         |
|   | tabletRefil |
|   | ls:         |
|   | 11Doctor's  |
|   | comments:   |
|   | Please      |
|   | consider 90 |
|   |  day        |
|   | supplies to |
|   |  promote    |
|   | better      |
|   | adherenceTA |
|   | KE ONE      |
|   | TABLET BY   |
|   | MOUTH TWICE |
|   |  DAILY AS   |
|   | NEEDED FOR  |
|   | NAUSEA      |
|   | ranitidine  |
|   | 150 MG      |
|   | tabletRefil |
|   | ls:         |
|   | 0Commonly   |
|   | known as:   |
|   | ZANTAC      |
|   | rOPINIRole  |
|   | 0.25 MG     |
|   | tabletRefil |
|   | ls:         |
|   | 0Commonly   |
|   | known as:   |
|   | REQUIP      |
|   | SLOW-MAG    |
|   | 71.5-119 MG |
|   |             |
|   | TbecRefills |
|   | :  0Generic |
|   |  drug:      |
|   | Magnesium   |
|   | Cl-Calcium  |
|   | Carbonate   |
|   | Vitamin D3  |
|   | 5000 units  |
|   | CapsRefills |
|   | :  0        |
|   | Vortioxetin |
 
|   | e HBr 10 MG |
|   |             |
|   | TabsRefills |
|   | :  0  *     |
|   | This list   |
|   | has 2       |
|   | medication( |
|   | s) that are |
|   |  the same   |
|   | as other    |
|   | medications |
|   |  prescribed |
|   |  for you.   |
|   | Read the    |
|   | directions  |
|   | carefully,  |
|   | and ask     |
|   | your doctor |
|   |  or other   |
|   | care        |
|   | provider to |
|   |  review     |
|   | them with   |
|   | you.    You |
|   |  might also |
|   |  be taking  |
|   | other       |
|   | medications |
|   |  not listed |
|   |  above. If  |
|   | you have    |
|   | questions   |
|   | about any   |
|   | of your     |
|   | other       |
|   | medications |
|   | , talk to   |
|   | the person  |
|   | who         |
|   | prescribed  |
|   | them or     |
|   | your        |
|   | Primary     |
|   | Care        |
|   | Provider.   |
|   |   STOP      |
|   | taking      |
|   | these       |
|   | medications |
|   |   aspirin   |
|   | 81 MG EC    |
|   | tablet      |
|   | Where to    |
|   | Get Your    |
|   | Medications |
|   |             |
|   | Information |
|   |  about      |
|   | where to    |
|   | get these   |
 
|   | medications |
|   |  is not yet |
|   |  available  |
|   |  Ask your   |
|   | nurse or    |
|   | doctor      |
|   | about these |
|   |             |
|   | medications |
|   |             |
|   | cefTAZidime |
|   |  2 g        |
|   | injection   |
|   |  vancomycin |
|   |  IVPB       |
|   | Prudence      |
|   | Lyn,    |
|   | MD-R2 |
|   | 0195:17 PM  |
+---+-------------+
 
 
 
+--------+-------------+---+---------------------+---+
| / | Procedure   |   |                     |   |
|    | Pass        |   |                     |   |
+--------+-------------+---+---------------------+---+
| / | Procedure   |   |                     |   |
|    | Pass        |   |                     |   |
+--------+-------------+---+---------------------+---+
| / | Procedure   |   |                     |   |
| 2019   | Pass        |   |                     |   |
+--------+-------------+---+---------------------+---+
| / | Orders Only |   |   Jessica Marley,  |   |
|    |             |   | [facility]                |   |
+--------+-------------+---+---------------------+---+
| / | Procedure   |   |                     |   |
|    | Pass        |   |                     |   |
+--------+-------------+---+---------------------+---+
| / | Refill      |   |   João Asher MD |   |
|    |             |   |                     |   |
+--------+-------------+---+---------------------+---+
 from Last 3 Months
 
 Immunizations
 
 
+----------------------+-------------------------------------------+----------+
| Name                 | Dates Previously Given                    | Next Due |
+----------------------+-------------------------------------------+----------+
| Hepatitis B Adult    | 2016                                |          |
+----------------------+-------------------------------------------+----------+
| INFLUENZA PF,        | 2018 (Deferred:  - Pt states she    |          |
| QUADRIVALENT         | already had flu shot at dialysis clinic.  |          |
| (PED/ADOL/ADULT)     | Refused vax in hospital)                  |          |
+----------------------+-------------------------------------------+----------+
| Influenza, Trivalent | 2016                                |          |
|  W/Preservative      |                                           |          |
+----------------------+-------------------------------------------+----------+
| Pneumococcal         | 2012                                |          |
 
| Polysaccharide       |                                           |          |
| 23-valent            |                                           |          |
+----------------------+-------------------------------------------+----------+
 
 
 
 Family History
 
 
+------------------+-----------+------+----------+
| Medical History  | Relation  | Name | Comments |
+------------------+-----------+------+----------+
| High cholesterol | Father    | pvd  |          |
+------------------+-----------+------+----------+
| Hypertension     | Father    | pvd  |          |
+------------------+-----------+------+----------+
| Diabetes         | Paternal  |      |          |
|                  | Grandmoth |      |          |
|                  | er        |      |          |
+------------------+-----------+------+----------+
| Kidney disease   | Neg Hx    |      |          |
+------------------+-----------+------+----------+
| Malig hypertherm | Neg Hx    |      |          |
+------------------+-----------+------+----------+
 
 
 
+----------------------+----------+----------+----------+
| Relation             | Name     | Status   | Comments |
+----------------------+----------+----------+----------+
| Father               | pvd      |  |          |
+----------------------+----------+----------+----------+
| Mother               | dementia |  |          |
+----------------------+----------+----------+----------+
| Paternal Grandmother |          |          |          |
+----------------------+----------+----------+----------+
 
 
 
 Social History
 
 
+---------------+------------+-----------+--------+------------------+
| Tobacco Use   | Types      | Packs/Day | Years  | Date             |
|               |            |           | Used   |                  |
+---------------+------------+-----------+--------+------------------+
| Former Smoker | Cigarettes | 1         | 30     | Quit: 2004 |
+---------------+------------+-----------+--------+------------------+
 
 
 
+---------------------+---+---+---+
| Smokeless Tobacco:  |   |   |   |
| Never Used          |   |   |   |
+---------------------+---+---+---+
 
 
 
+-----------------------------------------+
| Tobacco Cessation: Counseling Given: No |
 
| Comments: quite smoking 2004            |
+-----------------------------------------+
 
 
 
+-------------+-----------+---------+----------+
| Alcohol Use | Drinks/We | oz/Week | Comments |
|             | ek        |         |          |
+-------------+-----------+---------+----------+
| No          |           |         |          |
+-------------+-----------+---------+----------+
 
 
 
+------------------+---------------+
| Sex Assigned at  | Date Recorded |
| Birth            |               |
+------------------+---------------+
| Not on file      |               |
+------------------+---------------+
 
 
 
 Last Filed Vital Signs
 
 
+-------------------+---------------------+-------------------------+
| Vital Sign        | Reading             | Time Taken              |
+-------------------+---------------------+-------------------------+
| Blood Pressure    | 109/52              | 2019 11:53 AM PST |
+-------------------+---------------------+-------------------------+
| Pulse             | 71                  | 2019 11:53 AM PST |
+-------------------+---------------------+-------------------------+
| Temperature       | 36.8   C (98.2   F) | 2019  2:40 PM PST |
+-------------------+---------------------+-------------------------+
| Respiratory Rate  | 18                  | 02/15/2019 11:30 AM PST |
+-------------------+---------------------+-------------------------+
| Oxygen Saturation | 96%                 | 2019 11:53 AM PST |
+-------------------+---------------------+-------------------------+
| Inhaled Oxygen    | -                   | -                       |
| Concentration     |                     |                         |
+-------------------+---------------------+-------------------------+
| Weight            | 65.3 kg (144 lb)    | 2019 10:29 PM PST |
+-------------------+---------------------+-------------------------+
| Height            | 177.8 cm (5' 10")   | 2019 10:29 PM PST |
+-------------------+---------------------+-------------------------+
| Body Mass Index   | 20.66               | 2019 10:29 PM PST |
+-------------------+---------------------+-------------------------+
 
 
 
 Plan of Treatment
 
 
+--------+----------+-----------+----------------------+-------------+
| Date   | Type     | Specialty | Care Team            | Description |
+--------+----------+-----------+----------------------+-------------+
| / | Initial  |           |   Сергей Medeiros, |             |
| 2019   | consult  |           |  MD [address]  |             |
|        |          |           |  [place] [id number]  |             |
 
|        |          |           |  [phone]        |             |
|        |          |           | [phone] (Fax)   |             |
+--------+----------+-----------+----------------------+-------------+
 
 
 
+----------------------+-----------+---------------------------------+----------+
| Health Maintenance   | Due Date  | Last Done                       | Comments |
+----------------------+-----------+---------------------------------+----------+
| Diabetic Eye Exam    |  |                                 |          |
|                      | 9         |                                 |          |
+----------------------+-----------+---------------------------------+----------+
| Vaccine:             |  |                                 |          |
| Dtap/Tdap/Td (1 -    | 8         |                                 |          |
| Tdap)                |           |                                 |          |
+----------------------+-----------+---------------------------------+----------+
| Breast Cancer        |  |                                 |          |
| Screening            | 9         |                                 |          |
| (Mammogram)          |           |                                 |          |
+----------------------+-----------+---------------------------------+----------+
| Colon Cancer         |  |                                 |          |
| Screening            | 9         |                                 |          |
| (Colonoscopy)        |           |                                 |          |
+----------------------+-----------+---------------------------------+----------+
| Vaccine: Zoster (1   |  |                                 |          |
| of 2)                | 9         |                                 |          |
+----------------------+-----------+---------------------------------+----------+
| DEXA SCAN SCREENING  |  |                                 |          |
|                      | 4         |                                 |          |
+----------------------+-----------+---------------------------------+----------+
| Vaccine:             |  | 2012                      |          |
| Pneumococcal 65+     | 4         |                                 |          |
| High/Highest Risk (1 |           |                                 |          |
|  of 2 - PCV13)       |           |                                 |          |
+----------------------+-----------+---------------------------------+----------+
| Lung Cancer          |  | 2017                      |          |
| Screening            | 8         |                                 |          |
+----------------------+-----------+---------------------------------+----------+
| Hemoglobin A1c       |  | 2018, 2018,         |          |
|                      | 9         | 2017, Additional history  |          |
|                      |           | exists                          |          |
+----------------------+-----------+---------------------------------+----------+
| Vaccine: Influenza   |  | 2016                      |          |
| (Season Ended)       | 9         |                                 |          |
+----------------------+-----------+---------------------------------+----------+
| Diabetic Foot Exam   |  | 2018, 2017          |          |
|                      | 9         |                                 |          |
+----------------------+-----------+---------------------------------+----------+
 
 
 
 Implants
 
 
+-------------------------------+-------+--------+-------------+--------+--------+--------+
| Implanted                     | Type  | Area   | Manufacture | Device | Expira | Model  |
|                               |       |        | r           |        | tion   | /      |
|                               |       |        |             | Identi | Date   | Serial |
|                               |       |        |             | fier   |        |  / Lot |
+-------------------------------+-------+--------+-------------+--------+--------+--------+
 
| Express                       | Stent | Arteri | BOSTON      |        |        | RENAL  |
| Sd-2017Implanted: Qty: 1 |       | al     | SCIENTIFIC  |        |        | /BILIA |
|  on 2017 by Kirt Mlcaughlin, |       |        | MAURICE - [employer] |        |        | RY /   |
|  MD                           |       |        |             |        |        | /[id number] |
|                               |       |        |             |        |        | 046    |
+-------------------------------+-------+--------+-------------+--------+--------+--------+
| Express                       | Stent | Left:  | BOSTON      |        |        | [id number] |
| Stent-2017Implanted: Qty: |       | Arteri | MEDICAL     |        |        | [id number] |
|  1 on 2017 by Kirt Mclaughlin  |       | al     | PRODUCTS    |        |        | 130    |
| Y, MD                         |       |        |             |        |        | /[id number] |
|                               |       |        |             |        |        | [id number] |
|                               |       |        |             |        |        | 694    |
|                               |       |        |             |        |        | /[id number] |
|                               |       |        |             |        |        | 695    |
+-------------------------------+-------+--------+-------------+--------+--------+--------+
| Synergy Georgi 3.5 X             | Stent | Corona | BOSTON      |        | / | [id number] |
| Implanted: Qty: 1  |       | ry     | SCIENTIFIC  |        |    | [id number] |
| on 2017 by Cooper,      |       |        |             |        |        | 50 /   |
| Marcos BRADSHAW MD               |       |        |             |        |        | / |
|                               |       |        |             |        |        | 346    |
+-------------------------------+-------+--------+-------------+--------+--------+--------+
| Synergy Georgi 2.25 X            | Stent | Corona | [employer]      |        | / | [id number] |
| Implanted: Qty: 1  |       | ry     | SCIENTIFIC  |        |    | [id number] |
| on 2017 by Cooper,      |       |        |             |        |        | 20 /   |
| Marcos BRADSHAW MD               |       |        |             |        |        | / |
|                               |       |        |             |        |        | 165    |
+-------------------------------+-------+--------+-------------+--------+--------+--------+
| Synergy Georgi 2.25 X            | Stent | Corona | [employer]ON      |        | / | [id number] |
| 8-2017Implanted: Qty: 1   |       | ry     | SCIENTIFIC  |        |    | [id number] |
| on 2017 by Cooper,      |       |        |             |        |        | 20 /   |
| Marcos BRADSHAW MD               |       |        |             |        |        | / |
|                               |       |        |             |        |        | 438    |
+-------------------------------+-------+--------+-------------+--------+--------+--------+
| Epic                          | Stent | Right: | [employer]OSTON      |        | / | [id number] |
| Vascular-2019Implanted:  |       |        | SCIENTIFIC  |        |    | 2000 |
| Qty: 1 on 2019 by Arnoldo,  |       | Arteri |             |        |        | 020    |
| Kirt STEVENSON MD                     |       | al     |             |        |        | /N/A   |
|                               |       |        |             |        |        | / |
|                               |       |        |             |        |        | 135    |
+-------------------------------+-------+--------+-------------+--------+--------+--------+
| Supercable Iso Elastic        |       | Left:  | [employer]A     |        | / |  |
| Cerclage System   1.5 Mm      |       | Femur  |             |        |    | -1010  |
| PolymerImplanted: Qty: 1 on   |       |        |             |        |        | /      |
| 2016 by Black,          |       |        |             |        |        | / |
| MD Uriel                |       |        |             |        |        | 2      |
+-------------------------------+-------+--------+-------------+--------+--------+--------+
| Imp Hip Stem Bplr Slfctr      |       | Left:  | JJHCS DEPUY |        | / | 1035-4 |
| 48x28 - [id number]:    |       | Femur  |  ORTHO -    |        | 2020   | 8-000  |
| Qty: 1 on 2016 by       |       |        | DEPU        |        |        | /      |
| Uriel Roa MD         |       |        |             |        |        | /[id number] |
|                               |       |        |             |        |        | 3      |
+-------------------------------+-------+--------+-------------+--------+--------+--------+
| Corail Femoral StemImplanted: |       | Left:  | DEPUY       |        | / | [id number] |
|  Qty: 1 on 2016 by      |       | Femur  |             |        |    | 3 / /  |
| Uriel Roa MD         |       |        |             |        |        |        |
+-------------------------------+-------+--------+-------------+--------+--------+--------+
| Imp Hip Fem Hd Artc 28mm Pls5 |       | Left:  | JJHCS DEPUY |        | / | 1365-1 |
|  - [id number]: Qty: 1  |       | Femur  |  ORTHO -    |        | 2020   | 2-000  |
| on 2016 by Black,       |       |        | DEPU        |        |        | /      |
| MD Uriel                |       |        |             |        |        | / |
 
|                               |       |        |             |        |        | 1034   |
+-------------------------------+-------+--------+-------------+--------+--------+--------+
| Xgrft Vascu-Guard Ptch 0.8x8  |       |        | HOOKER      |        | / | VG-010 |
| - SnaImplanted: Qty: 1 on     |       |        | BIOSCIENCE  |        |    | 8N /   |
| 10/25/2016 by Kirt Mclaughlin MD  |       |        | - OLGA      |        |        | /[id number] |
|                               |       |        |             |        |        |  |
|                               |       |        |             |        |        | 9414   |
+-------------------------------+-------+--------+-------------+--------+--------+--------+
 
 
 
 Procedures
 
 
+----------------------+--------+-------------+----------------------+----------------------
+
| Procedure Name       | Priori | Date/Time   | Associated Diagnosis | Comments             
|
|                      | ty     |             |                      |                      
|
+----------------------+--------+-------------+----------------------+----------------------
+
| XR FOOT RIGHT        | Routin | 2019  |   Toe pain, right    |   Results for this   
|
|                      | e      |  2:58 PM    |                      | procedure are in the 
|
|                      |        | PST         |                      |  results section.    
|
+----------------------+--------+-------------+----------------------+----------------------
+
| XR FOOT LEFT         | Routin | 2019  |   Post-operative     |   Results for this   
|
|                      | e      |  2:58 PM    | state                | procedure are in the 
|
|                      |        | PST         |                      |  results section.    
|
+----------------------+--------+-------------+----------------------+----------------------
+
| POCT GLUCOSE         | Routin | 02/15/2019  |                      |   Results for this   
|
|                      | e      | 11:36 AM    |                      | procedure are in the 
|
|                      |        | PST         |                      |  results section.    
|
+----------------------+--------+-------------+----------------------+----------------------
+
| POCT GLUCOSE         | Routin | 02/15/2019  |                      |   Results for this   
|
|                      | e      |  6:01 AM    |                      | procedure are in the 
|
|                      |        | PST         |                      |  results section.    
|
+----------------------+--------+-------------+----------------------+----------------------
+
| POCT GLUCOSE         | Routin | 2019  |                      |   Results for this   
|
|                      | e      | 10:32 PM    |                      | procedure are in the 
|
|                      |        | PST         |                      |  results section.    
|
 
+----------------------+--------+-------------+----------------------+----------------------
+
| [facility] SEPTIC LACTIC   | STAT   | 2019  |                      |   Results for this   
|
| ACID                 |        | 10:12 PM    |                      | procedure are in the 
|
|                      |        | PST         |                      |  results section.    
|
+----------------------+--------+-------------+----------------------+----------------------
+
| [facility] SEPTIC LACTIC   | STAT   | 2019  |                      |   Results for this   
|
| ACID                 |        |  7:22 PM    |                      | procedure are in the 
|
|                      |        | PST         |                      |  results section.    
|
+----------------------+--------+-------------+----------------------+----------------------
+
| TSH                  | STAT   | 2019  |                      |   Results for this   
|
|                      |        |  5:39 PM    |                      | procedure are in the 
|
|                      |        | PST         |                      |  results section.    
|
+----------------------+--------+-------------+----------------------+----------------------
+
| FOLATE               | Add-On | 2019  |                      |   Results for this   
|
|                      |        |  5:39 PM    |                      | procedure are in the 
|
|                      |        | PST         |                      |  results section.    
|
+----------------------+--------+-------------+----------------------+----------------------
+
| VITAMIN B12          | Add-On | 2019  |                      |   Results for this   
|
|                      |        |  5:39 PM    |                      | procedure are in the 
|
|                      |        | PST         |                      |  results section.    
|
+----------------------+--------+-------------+----------------------+----------------------
+
| SEDIMENTATION RATE,  | STAT   | 2019  |                      |   Results for this   
|
| AUTOMATED            |        |  5:39 PM    |                      | procedure are in the 
|
|                      |        | PST         |                      |  results section.    
|
+----------------------+--------+-------------+----------------------+----------------------
+
| COMPREHENSIVE        | STAT   | 2019  |                      |   Results for this   
|
| METABOLIC PANEL      |        |  5:39 PM    |                      | procedure are in the 
|
|                      |        | PST         |                      |  results section.    
|
+----------------------+--------+-------------+----------------------+----------------------
+
| CBC W/AUTO DIFF      | STAT   | 2019  |                      |   Results for this   
|
 
| (REFLEX TO MANUAL)   |        |  5:39 PM    |                      | procedure are in the 
|
|                      |        | PST         |                      |  results section.    
|
+----------------------+--------+-------------+----------------------+----------------------
+
| C-REACTIVE PROTEIN   | STAT   | 2019  |                      |   Results for this   
|
|                      |        |  5:39 PM    |                      | procedure are in the 
|
|                      |        | PST         |                      |  results section.    
|
+----------------------+--------+-------------+----------------------+----------------------
+
| BLOOD CULTURE, SET 2 | STAT   | 2019  |                      |   Results for this   
|
|                      |        |  5:39 PM    |                      | procedure are in the 
|
|                      |        | PST         |                      |  results section.    
|
+----------------------+--------+-------------+----------------------+----------------------
+
| XR TOES RIGHT        | ASAP   | 2019  |                      |   Results for this   
|
|                      |        |  5:08 PM    |                      | procedure are in the 
|
|                      |        | PST         |                      |  results section.    
|
+----------------------+--------+-------------+----------------------+----------------------
+
| [facility] SEPTIC LACTIC   | STAT   | 2019  |                      |   Results for this   
|
| ACID                 |        |  4:55 PM    |                      | procedure are in the 
|
|                      |        | PST         |                      |  results section.    
|
+----------------------+--------+-------------+----------------------+----------------------
+
| BLOOD CULTURE, SET 1 | STAT   | 2019  |                      |   Results for this   
|
|                      |        |  4:55 PM    |                      | procedure are in the 
|
|                      |        | PST         |                      |  results section.    
|
+----------------------+--------+-------------+----------------------+----------------------
+
| ED INFORMATION       | Routin | 2019  |                      |   Results for this   
|
| EXCHANGE             | e      |  3:21 PM    |                      | procedure are in the 
|
|                      |        | PST         |                      |  results section.    
|
+----------------------+--------+-------------+----------------------+----------------------
+
| ED INFORMATION       | Routin | 2019  |                      |   Results for this   
|
| EXCHANGE             | e      |  3:58 PM    |                      | procedure are in the 
|
|                      |        | PST         |                      |  results section.    
|
 
+----------------------+--------+-------------+----------------------+----------------------
+
| POCT GLUCOSE         | Routin | 2019  |                      |   Results for this   
|
|                      | e      | 12:10 PM    |                      | procedure are in the 
|
|                      |        | PST         |                      |  results section.    
|
+----------------------+--------+-------------+----------------------+----------------------
+
| POCT GLUCOSE         | Routin | 2019  |                      |   Results for this   
|
|                      | e      |  5:55 AM    |                      | procedure are in the 
|
|                      |        | PST         |                      |  results section.    
|
+----------------------+--------+-------------+----------------------+----------------------
+
| PHOSPHOROUS          | Routin | 2019  |                      |   Results for this   
|
|                      | e      |  5:38 AM    |                      | procedure are in the 
|
|                      |        | PST         |                      |  results section.    
|
+----------------------+--------+-------------+----------------------+----------------------
+
| MAGNESIUM            | Routin | 2019  |                      |   Results for this   
|
|                      | e      |  5:38 AM    |                      | procedure are in the 
|
|                      |        | PST         |                      |  results section.    
|
+----------------------+--------+-------------+----------------------+----------------------
+
| BASIC METABOLIC      | Routin | 2019  |                      |   Results for this   
|
| PANEL                | e      |  5:38 AM    |                      | procedure are in the 
|
|                      |        | PST         |                      |  results section.    
|
+----------------------+--------+-------------+----------------------+----------------------
+
| CBC W/AUTO DIFF      | Routin | 2019  |                      |   Results for this   
|
| (REFLEX TO MANUAL)   | e      |  5:38 AM    |                      | procedure are in the 
|
|                      |        | PST         |                      |  results section.    
|
+----------------------+--------+-------------+----------------------+----------------------
+
| POCT GLUCOSE         | Routin | 2019  |                      |   Results for this   
|
|                      | e      |  9:29 PM    |                      | procedure are in the 
|
|                      |        | PST         |                      |  results section.    
|
+----------------------+--------+-------------+----------------------+----------------------
+
| POCT GLUCOSE         | Routin | 2019  |                      |   Results for this   
|
 
|                      | e      |  4:06 PM    |                      | procedure are in the 
|
|                      |        | PST         |                      |  results section.    
|
+----------------------+--------+-------------+----------------------+----------------------
+
| POCT GLUCOSE         | Routin | 2019  |                      |   Results for this   
|
|                      | e      | 12:13 PM    |                      | procedure are in the 
|
|                      |        | PST         |                      |  results section.    
|
+----------------------+--------+-------------+----------------------+----------------------
+
| EKG STANDARD 12 LEAD | Routin | 2019  |                      |   Results for this   
|
|                      | e      |  6:18 AM    |                      | procedure are in the 
|
|                      |        | PST         |                      |  results section.    
|
+----------------------+--------+-------------+----------------------+----------------------
+
| POCT GLUCOSE         | Routin | 2019  |                      |   Results for this   
|
|                      | e      |  5:21 AM    |                      | procedure are in the 
|
|                      |        | PST         |                      |  results section.    
|
+----------------------+--------+-------------+----------------------+----------------------
+
| BASIC METABOLIC      | Routin | 2019  |                      |   Results for this   
|
| PANEL                | e - AM |  4:53 AM    |                      | procedure are in the 
|
|                      |        | PST         |                      |  results section.    
|
+----------------------+--------+-------------+----------------------+----------------------
+
| CBC W/AUTO DIFF      | Routin | 2019  |                      |   Results for this   
|
| (REFLEX TO MANUAL)   | e - AM |  4:53 AM    |                      | procedure are in the 
|
|                      |        | PST         |                      |  results section.    
|
+----------------------+--------+-------------+----------------------+----------------------
+
| POCT GLUCOSE         | Routin | 2019  |                      |   Results for this   
|
|                      | e      |  9:00 PM    |                      | procedure are in the 
|
|                      |        | PST         |                      |  results section.    
|
+----------------------+--------+-------------+----------------------+----------------------
+
| IR STENT FEMORAL     | Routin | 2019  |                      |   Results for this   
|
| POPLITEAL            | e      |  6:45 PM    |                      | procedure are in the 
|
|                      |        | PST         |                      |  results section.    
|
 
+----------------------+--------+-------------+----------------------+----------------------
+
| IR AORTAGRAM         | Routin | 2019  |                      |   Results for this   
|
| ABDOMINAL            | e      |  6:45 PM    |                      | procedure are in the 
|
| SERIALOGRAM          |        | PST         |                      |  results section.    
|
+----------------------+--------+-------------+----------------------+----------------------
+
| IR ANGIOGRAM         | Routin | 2019  |                      |   Results for this   
|
| EXTREMITY RIGHT      | e      |  6:45 PM    |                      | procedure are in the 
|
|                      |        | PST         |                      |  results section.    
|
+----------------------+--------+-------------+----------------------+----------------------
+
| BLOOD CULTURE, SET 2 | Timed  | 2019  |                      |   Results for this   
|
|                      |        |  6:05 PM    |                      | procedure are in the 
|
|                      |        | PST         |                      |  results section.    
|
+----------------------+--------+-------------+----------------------+----------------------
+
| IR GUIDANCE VASCULAR | Routin | 2019  |                      |   Results for this   
|
|  ACCESS US           | e      |  5:40 PM    |                      | procedure are in the 
|
|                      |        | PST         |                      |  results section.    
|
+----------------------+--------+-------------+----------------------+----------------------
+
| C-REACTIVE PROTEIN   | STAT   | 2019  |                      |   Results for this   
|
|                      |        |  3:43 PM    |                      | procedure are in the 
|
|                      |        | PST         |                      |  results section.    
|
+----------------------+--------+-------------+----------------------+----------------------
+
| SEDIMENTATION RATE,  | STAT   | 2019  |                      |   Results for this   
|
| AUTOMATED            |        |  3:43 PM    |                      | procedure are in the 
|
|                      |        | PST         |                      |  results section.    
|
+----------------------+--------+-------------+----------------------+----------------------
+
| CK                   | STAT   | 2019  |                      |   Results for this   
|
|                      |        |  3:43 PM    |                      | procedure are in the 
|
|                      |        | PST         |                      |  results section.    
|
+----------------------+--------+-------------+----------------------+----------------------
+
| COMPREHENSIVE        | STAT   | 2019  |                      |   Results for this   
|
 
| METABOLIC PANEL      |        |  3:43 PM    |                      | procedure are in the 
|
|                      |        | PST         |                      |  results section.    
|
+----------------------+--------+-------------+----------------------+----------------------
+
| CBC W/MANUAL DIFF    | STAT   | 2019  |                      |   Results for this   
|
|                      |        |  3:43 PM    |                      | procedure are in the 
|
|                      |        | PST         |                      |  results section.    
|
+----------------------+--------+-------------+----------------------+----------------------
+
| BLOOD CULTURE, SET 1 | Timed  | 2019  |                      |   Results for this   
|
|                      |        |  3:43 PM    |                      | procedure are in the 
|
|                      |        | PST         |                      |  results section.    
|
+----------------------+--------+-------------+----------------------+----------------------
+
| US LOWER EXTREMITY   | STAT   | 2019  |                      |   Results for this   
|
| ARTERIAL RIGHT       |        |  2:23 PM    |                      | procedure are in the 
|
|                      |        | PST         |                      |  results section.    
|
+----------------------+--------+-------------+----------------------+----------------------
+
| ED INFORMATION       | Routin | 2019  |                      |   Results for this   
|
| EXCHANGE             | e      |  1:03 PM    |                      | procedure are in the 
|
|                      |        | PST         |                      |  results section.    
|
+----------------------+--------+-------------+----------------------+----------------------
+
 from Last 3 Months
 
 Results
 X-ray foot right 3+ views (2019  2:58 PM)
 
+------------------------------------------------------------------------+--------------+
| Impressions                                                            | Performed At |
+------------------------------------------------------------------------+--------------+
|   Linear lucency/irregularity at the distal 1st phalanx, may represent |   [facility]     |
|   minimally displaced fracture.  Signed by: Oleksandr Lemus  Sign         | RADIOLOGY    |
| Date/Time: 2019 10:20 AM                                         |              |
+------------------------------------------------------------------------+--------------+
 
 
 
+------------------------------------------------------------------------+--------------+
| Narrative                                                              | Performed At |
+------------------------------------------------------------------------+--------------+
|   RIGHT FOOT THREE VIEWS  CLINICAL INFORMATION:  Possible fracture of  |   [facility]     |
| right hallux.  COMPARISON:  XR TOES RIGHT (2019); XR FOOT LEFT    | RADIOLOGY    |
| (2018);  FINDINGS:  No acute fracture or dislocation.  Small     |              |
| calcaneal plantar enthesophyte.  Linear lucency/irregularity at the    |              |
 
| distal 1st phalanx, may represent  minimally displaced fracture.  Mild |              |
|  midfoot degeneration.                                                 |              |
+------------------------------------------------------------------------+--------------+
 
 
 
+-------------------------------------------------------------------------+
| Procedure Note                                                          |
+-------------------------------------------------------------------------+
|   Denny, Rad Results In - 2019 10:23 AM PST  RIGHT FOOT THREE VIEWS |
| CLINICAL INFORMATION:                                                   |
| Possible fracture of right hallux.                                      |
| COMPARISON:                                                             |
| XR TOES RIGHT (2019); XR FOOT LEFT (2018);                   |
| FINDINGS:                                                               |
| No acute fracture or dislocation.                                       |
| Small calcaneal plantar enthesophyte.                                   |
| Linear lucency/irregularity at the distal 1st phalanx, may represent    |
| minimally displaced fracture.                                           |
| Mild midfoot degeneration.                                              |
| IMPRESSION:                                                             |
| Linear lucency/irregularity at the distal 1st phalanx, may represent    |
| minimally displaced fracture.                                           |
| Signed by: Oleksandr Lemus                                                 |
| Sign Date/Time: 2019 10:20 AM                                     |
+-------------------------------------------------------------------------+
 
 
 
+--------------------+------------------+--------------------+--------------+
| Performing         | Address          | City/State/Zipcode | Phone Number |
| Organization       |                  |                    |              |
+--------------------+------------------+--------------------+--------------+
|   [facility] |   [address] | [place] [address] |              |
+--------------------+------------------+--------------------+--------------+
 X-ray foot left 3 + views (2019  2:58 PM)
 
+----------------------------------------------------------------------+--------------+
| Impressions                                                          | Performed At |
+----------------------------------------------------------------------+--------------+
|   No acute fracture.    No radiographic evidence for osteomyelitis   |   [facility]     |
| Signed by: Oleksandr Lemus  Sign Date/Time: 2019 10:21 AM         | RADIOLOGY    |
+----------------------------------------------------------------------+--------------+
 
 
 
+------------------------------------------------------------------------+--------------+
| Narrative                                                              | Performed At |
+------------------------------------------------------------------------+--------------+
|   LEFT FOOT THREE VIEWS  CLINICAL INFORMATION:  8 weeks post op,       |   [facility]     |
| forefoot amputation.  COMPARISON:  XR TOES RIGHT (2019); XR FOOT  | RADIOLOGY    |
| LEFT (2018); XR FOOT LEFT  (2018);  FINDINGS:  Amputation  |              |
| of the forefoot at the level of the proximal metacarpals.  No          |              |
| radiographic evidence for osteomyelitis.  No acute fractures.          |              |
+------------------------------------------------------------------------+--------------+
 
 
 
+------------------------------------------------------------------------+
| Procedure Note                                                         |
 
+------------------------------------------------------------------------+
|   Lauro Baldwin Results In - 2019 10:25 AM PST  LEFT FOOT THREE VIEWS |
| CLINICAL INFORMATION:                                                  |
| 8 weeks post op, forefoot amputation.                                  |
| COMPARISON:                                                            |
| XR TOES RIGHT (2019); XR FOOT LEFT (2018); XR FOOT LEFT     |
| (2018);                                                          |
| FINDINGS:                                                              |
| Amputation of the forefoot at the level of the proximal metacarpals.   |
| No radiographic evidence for osteomyelitis.                            |
| No acute fractures.                                                    |
| IMPRESSION:                                                            |
| No acute fracture.  No radiographic evidence for osteomyelitis         |
| Signed by: Oleksandr Lemus                                                |
| Sign Date/Time: 2019 10:21 AM                                    |
+------------------------------------------------------------------------+
 
 
 
+--------------------+------------------+--------------------+--------------+
| Performing         | Address          | City/State/Zipcode | Phone Number |
| Organization       |                  |                    |              |
+--------------------+------------------+--------------------+--------------+
|   [facility] |   [address] | [place] [address] |              |
+--------------------+------------------+--------------------+--------------+
 POCT glucose (02/15/2019 11:36 AM)Only the most recent of 10 results within the time period
 is included.
 
+--------------------+--------------------------+---------------+-----------------+
| Component          | Value                    | Ref Range     | Performed At    |
+--------------------+--------------------------+---------------+-----------------+
| GLUCOSE,POC SCREEN | 231 (H)Comment: Testing  | 65 - 99 mg/dL | [facility] |
|                    | performed at [facility];[address]     |               |                 |
|                    | [address];[place]   |               |                 |
|                    | [id number]                    |               |                 |
+--------------------+--------------------------+---------------+-----------------+
 
 
 
+-------------------+------------------+--------------------+--------------+
| Performing        | Address          | City/State/Zipcode | Phone Number |
| Organization      |                  |                    |              |
+-------------------+------------------+--------------------+--------------+
|   [facility] |   [address] | [place] [address] |              |
+-------------------+------------------+--------------------+--------------+
 Septic Lactic Acid (2019 10:12 PM)Only the most recent of 3 results within the time krystian sparks is included.
 
+-------------+-------------------------+------------------+-----------------+
| Component   | Value                   | Ref Range        | Performed At    |
+-------------+-------------------------+------------------+-----------------+
| LACTIC ACID | 2.0Comment: Testing     | 0.4 - 2.0 mmol/L | [facility] |
|             | performed at [facility];[address]    |                  |                 |
|             | [address];[place]  |                  |                 |
|             | [id number]                   |                  |                 |
+-------------+-------------------------+------------------+-----------------+
 
 
 
+-------------------+------------------+--------------------+--------------+
 
| Performing        | Address          | City/State/Zipcode | Phone Number |
| Organization      |                  |                    |              |
+-------------------+------------------+--------------------+--------------+
|   [facility] |   [address] | [place] [address] |              |
+-------------------+------------------+--------------------+--------------+
 Blood Culture Set 2 (2019  5:39 PM)Only the most recent of 2 results within the time 
period is included.
 
+----------------------+------------------------+-----------+-----------------+
| Component            | Value                  | Ref Range | Performed At    |
+----------------------+------------------------+-----------+-----------------+
| Specimen Description | BLOOD, PERIPHERAL DRAW |           | [facility]      |
|                      |                        |           | LABORATORY      |
+----------------------+------------------------+-----------+-----------------+
| SPECIAL REQUESTS     | R HAND                 |           | [facility] |
+----------------------+------------------------+-----------+-----------------+
| CULTURE              | NO GROWTH 6 DAYS       |           | [facility]      |
|                      |                        |           | LABORATORY      |
+----------------------+------------------------+-----------+-----------------+
 
 
 
+-----------------+
| Specimen        |
+-----------------+
| Blood - Blood,  |
| peripheral draw |
+-----------------+
 
 
 
+-------------------+-------------------------+---------------------+----------------+
| Performing        | Address                 | City/State/Zipcode  | Phone Number   |
| Organization      |                         |                     |                |
+-------------------+-------------------------+---------------------+----------------+
|   [facility]      |   [address] [place] [address]  | [place] [address] |   [phone] |
| LABORATORY        | [address]                   |                     |                |
+-------------------+-------------------------+---------------------+----------------+
|   [facility] |   [address]        | [place] [address]  |                |
+-------------------+-------------------------+---------------------+----------------+
 Sedimentation Rate (ESR) (2019  5:39 PM)Only the most recent of 2 results within the 
time period is included.
 
+-----------+-------------------------+--------------+-----------------+
| Component | Value                   | Ref Range    | Performed At    |
+-----------+-------------------------+--------------+-----------------+
| ESR       | 13Comment: Testing      | 0 - 30 mm/Hr | [facility] |
|           | performed at [facility];[address]    |              |                 |
|           | [address];[place]  |              |                 |
|           | [id number]                   |              |                 |
+-----------+-------------------------+--------------+-----------------+
 
 
 
+----------+
| Specimen |
+----------+
| Blood    |
+----------+
 
 
 
 
+-------------------+------------------+--------------------+--------------+
| Performing        | Address          | City/State/Zipcode | Phone Number |
| Organization      |                  |                    |              |
+-------------------+------------------+--------------------+--------------+
|   [facility] |   [address] | [place] [place] [id number] |              |
+-------------------+------------------+--------------------+--------------+
 CBC with differential (2019  5:39 PM)Only the most recent of 3 results within the rebkeah
e period is included.
 
+-------------------+------------------------+-------------------+-----------------+
| Component         | Value                  | Ref Range         | Performed At    |
+-------------------+------------------------+-------------------+-----------------+
| WBC               | 5.14                   | 3.80 - 11.00 K/uL | [facility] |
+-------------------+------------------------+-------------------+-----------------+
| RBC               | 2.91 (L)               | 3.70 - 5.10 M/uL  | [facility] |
+-------------------+------------------------+-------------------+-----------------+
| HGB               | 10.1 (L)               | 11.3 - 15.5 g/dL  | [facility] |
+-------------------+------------------------+-------------------+-----------------+
| HCT               | 30.9 (L)               | 34.0 - 46.0 %     | [facility] |
+-------------------+------------------------+-------------------+-----------------+
| MCV               | 106.1 (H)              | 80.0 - 100.0 fl   | [facility] |
+-------------------+------------------------+-------------------+-----------------+
| MCH               | 34.8 (H)               | 27.0 - 34.0 pg    | [facility] |
+-------------------+------------------------+-------------------+-----------------+
| MCHC              | 32.8                   | 32.0 - 35.5 g/dL  | [facility] |
+-------------------+------------------------+-------------------+-----------------+
| RDW SD            | 61.3 (H)               | 37 - 53 fl        | [facility] |
+-------------------+------------------------+-------------------+-----------------+
| PLT               | 145 (L)                | 150 - 400 K/uL    | [facility] |
+-------------------+------------------------+-------------------+-----------------+
| MPV               | 9.3                    | fl                | [facility] |
+-------------------+------------------------+-------------------+-----------------+
| DIFF TYPE         | AUTOMATED              |                   | [facility] |
+-------------------+------------------------+-------------------+-----------------+
| NEUTROPHILS       | 74.03                  | %                 | [facility] |
+-------------------+------------------------+-------------------+-----------------+
| LYMPHOCYTES       | 20.20                  | %                 | [facility] |
+-------------------+------------------------+-------------------+-----------------+
| MONOCYTES         | 5.16                   | %                 | [facility] |
+-------------------+------------------------+-------------------+-----------------+
| EOSINOPHILS       | 0.03                   | %                 | [facility] |
+-------------------+------------------------+-------------------+-----------------+
| BASOPHILS         | 0.58                   | %                 | [facility] |
+-------------------+------------------------+-------------------+-----------------+
| NEUTROPHILS ABS   | 3.81                   | 1.90 - 7.40 K/uL  | [facility] |
+-------------------+------------------------+-------------------+-----------------+
| LYMPHOCYTES ABS   | 1.04                   | 1.00 - 3.90 K/uL  | [facility] |
+-------------------+------------------------+-------------------+-----------------+
| MONOCYTES ABS     | 0.27                   | 0.00 - 0.80 K/uL  | [facility] |
+-------------------+------------------------+-------------------+-----------------+
| EOSINOPHILS ABS   | 0.00                   | 0.00 - 0.50 K/uL  | [facility] |
+-------------------+------------------------+-------------------+-----------------+
| BASOPHILS ABS     | 0.03                   | 0.00 - 0.10 K/uL  | [facility] |
+-------------------+------------------------+-------------------+-----------------+
| MORPHOLOGY        | 1+                     |                   | [facility] |
|                   | Comment:               |                   |                 |
|                   | ANISO                  |                   |                 |
|                   | 1+                     |                   |                 |
 
|                   | MACRO                  |                   |                 |
|                   | NORMAL PLT MORPH       |                   |                 |
|                   |                        |                   |                 |
+-------------------+------------------------+-------------------+-----------------+
| Platelet Estimate | DECREASEDComment:      |                   | [facility] |
|                   | Testing performed at   |                   |                 |
|                   | [facility];[address]          |                   |                 |
|                   | [address];[place] [address] |                   |                 |
+-------------------+------------------------+-------------------+-----------------+
 
 
 
+----------+
| Specimen |
+----------+
| Blood    |
+----------+
 
 
 
+-------------------+------------------+--------------------+--------------+
| Performing        | Address          | City/State/Zipcode | Phone Number |
| Organization      |                  |                    |              |
+-------------------+------------------+--------------------+--------------+
|   [facility] |   [address] | [place] [address] |              |
+-------------------+------------------+--------------------+--------------+
 C-Reactive Protein (2019  5:39 PM)Only the most recent of 2 results within the time p
clare is included.
 
+-----------+--------------------------+------------+-----------------+
| Component | Value                    | Ref Range  | Performed At    |
+-----------+--------------------------+------------+-----------------+
| CRP       | 0.8 (H)Comment: Testing  | <0.5 mg/dL | [facility] |
|           | performed at [facility];[address]     |            |                 |
|           | [address];[place], [place]   |            |                 |
|           | [id number]                    |            |                 |
+-----------+--------------------------+------------+-----------------+
 
 
 
+----------+
| Specimen |
+----------+
| Blood    |
+----------+
 
 
 
+-------------------+------------------+--------------------+--------------+
| Performing        | Address          | City/State/Zipcode | Phone Number |
| Organization      |                  |                    |              |
+-------------------+------------------+--------------------+--------------+
|   [facility] |   [address] | [place] [address] |              |
+-------------------+------------------+--------------------+--------------+
 TSH (2019  5:39 PM)
 
+-----------+-------------------------+----------------------+-----------------+
| Component | Value                   | Ref Range            | Performed At    |
+-----------+-------------------------+----------------------+-----------------+
| TSH       | 0.904Comment: Testing   | 0.450 - 5.100 uIU/mL | [facility] |
 
|           | performed at [facility];[address]    |                      |                 |
|           | [address];[place]  |                      |                 |
|           | [address]                   |                      |                 |
+-----------+-------------------------+----------------------+-----------------+
 
 
 
+----------+
| Specimen |
+----------+
| Blood    |
+----------+
 
 
 
+-------------------+------------------+--------------------+--------------+
| Performing        | Address          | City/State/Zipcode | Phone Number |
| Organization      |                  |                    |              |
+-------------------+------------------+--------------------+--------------+
|   [facility] |   [address] | [place] [address] |              |
+-------------------+------------------+--------------------+--------------+
 Folate (2019  5:39 PM)
 
+-----------+--------------------------+------------+--------------+
| Component | Value                    | Ref Range  | Performed At |
+-----------+--------------------------+------------+--------------+
| FOLATE    | 8.0Comment: Testing      | >5.4 ng/mL | [facility]   |
|           | performed at [facility], [address] |            | LABORATORY   |
|           |  [address],        |            |              |
|           | [place]  [address]     |            |              |
+-----------+--------------------------+------------+--------------+
 
 
 
+----------+
| Specimen |
+----------+
| Blood    |
+----------+
 
 
 
+---------------+-------------------------+---------------------+----------------+
| Performing    | Address                 | City/State/Zipcode  | Phone Number   |
| Organization  |                         |                     |                |
+---------------+-------------------------+---------------------+----------------+
|   [facility]  |   [address]  | [place] [address] |   [phone] |
| LABORATORY    | [address]                   |                     |                |
+---------------+-------------------------+---------------------+----------------+
 Vitamin B12 (2019  5:39 PM)
 
+-------------+--------------------------+-------------------+--------------+
| Component   | Value                    | Ref Range         | Performed At |
+-------------+--------------------------+-------------------+--------------+
| VITAMIN B12 | 553Comment: Testing      | 254 - 1,320 pg/mL | [facility]   |
|             | performed at [facility], [address] |                   | LABORATORY   |
|             |  [address],        |                   |              |
|             | [place]  [address]     |                   |              |
+-------------+--------------------------+-------------------+--------------+
 
 
 
 
+----------+
| Specimen |
+----------+
| Blood    |
+----------+
 
 
 
+---------------+-------------------------+---------------------+----------------+
| Performing    | Address                 | City/State/Zipcode  | Phone Number   |
| Organization  |                         |                     |                |
+---------------+-------------------------+---------------------+----------------+
|   [facility]  |   [address]  | [place] [address] |   [phone] |
| LABORATORY    | [address]                   |                     |                |
+---------------+-------------------------+---------------------+----------------+
 Comprehensive metabolic panel (2019  5:39 PM)Only the most recent of 2 results within
 the time period is included.
 
+----------------+--------------------------+-------------------+-----------------+
| Component      | Value                    | Ref Range         | Performed At    |
+----------------+--------------------------+-------------------+-----------------+
| SODIUM         | 143                      | 135 - 145 mmol/L  | [facility] |
+----------------+--------------------------+-------------------+-----------------+
| POTASSIUM      | 4.7                      | 3.5 - 4.9 mmol/L  | [facility] |
+----------------+--------------------------+-------------------+-----------------+
| CHLORIDE       | 96 (L)                   | 99 - 109 mmol/L   | [facility] |
+----------------+--------------------------+-------------------+-----------------+
| CO2            | >40 (HH)Comment: CALLED  | 23 - 32 mmol/L    | [facility] |
|                | NURSING UNITREAD BACK    |                   |                 |
|                | RESULTS VERIFIEDCALLED   |                   |                 |
|                | TO RN IN ED AT 1830 BY   |                   |                 |
|                | LGJ                      |                   |                 |
|                |                          |                   |                 |
+----------------+--------------------------+-------------------+-----------------+
| ANION GAP AGAP | UNABLE TO CALCULATE      | 5 - 20 mmol/L     | [facility] |
+----------------+--------------------------+-------------------+-----------------+
| GLUCOSE        | 160 (H)                  | 65 - 99 mg/dL     | [facility] |
+----------------+--------------------------+-------------------+-----------------+
| BUN            | 9                        | 8 - 25 mg/dL      | [facility] |
+----------------+--------------------------+-------------------+-----------------+
| CREATININE     | 2.96 (H)                 | 0.50 - 1.00 mg/dL | [facility] |
+----------------+--------------------------+-------------------+-----------------+
| BUN/CREAT      | 3                        |                   | [facility] |
+----------------+--------------------------+-------------------+-----------------+
| CALCIUM        | 9.4                      | 8.5 - 10.5 mg/dL  | [facility] |
+----------------+--------------------------+-------------------+-----------------+
| TOTAL PROTEIN  | 6.7                      | 6.3 - 8.2 g/dL    | [facility] |
+----------------+--------------------------+-------------------+-----------------+
| Albumin        | 4.3                      | 3.3 - 4.8 g/dL    | [facility] |
+----------------+--------------------------+-------------------+-----------------+
| GLOBULIN       | 2.4                      | 1.3 - 4.9 g/dL    | [facility] |
+----------------+--------------------------+-------------------+-----------------+
| A/G            | 1.8                      | 1.0 - 2.4         | [facility] |
+----------------+--------------------------+-------------------+-----------------+
| TBIL           | 0.5                      | 0.1 - 1.5 mg/dL   | [facility] |
+----------------+--------------------------+-------------------+-----------------+
| ALK PHOS       | 103                      | 35 - 115 U/L      | [facility] |
+----------------+--------------------------+-------------------+-----------------+
 
| AST            | 54 (H)                   | 10 - 45 U/L       | [facility] |
+----------------+--------------------------+-------------------+-----------------+
| ALT            | 40                       | 10 - 65 U/L       | [facility] |
+----------------+--------------------------+-------------------+-----------------+
| EGFR           | 16 (L)Comment: GFR <60:  | >60 mL/min/1.73m2 | [facility] |
|                | CHRONIC KIDNEY DISEASE,  |                   |                 |
|                | IF FOUND OVER A 3 MONTH  |                   |                 |
|                | PERIOD.GFR <15: KIDNEY   |                   |                 |
|                | FAILURE.FOR       |                   |                 |
|                | AMERICANS, MULTIPLY THE  |                   |                 |
|                | CALCULATED GFR BY        |                   |                 |
|                | 1.210.This eGFR is       |                   |                 |
|                | calculated using the     |                   |                 |
|                | MDRD IDMS traceable      |                   |                 |
|                | equation.Testing         |                   |                 |
|                | performed at [facility];[address]     |                   |                 |
|                | [address];[place], [place]   |                   |                 |
|                | [address]                    |                   |                 |
+----------------+--------------------------+-------------------+-----------------+
 
 
 
+----------+
| Specimen |
+----------+
| Blood    |
+----------+
 
 
 
+-------------------+------------------+--------------------+--------------+
| Performing        | Address          | City/State/Zipcode | Phone Number |
| Organization      |                  |                    |              |
+-------------------+------------------+--------------------+--------------+
|   [facility] |   [address] | [place] [address] |              |
+-------------------+------------------+--------------------+--------------+
 XR Toe Right 2 View (2019  5:08 PM)
 
+----------------------------------------------------------------------+--------------+
| Impressions                                                          | Performed At |
+----------------------------------------------------------------------+--------------+
|   No radiographic evidence of osteomyelitis seen.    If further      |   [facility]     |
| assessment  is warranted, consider correlation with MRI.  Potential  | RADIOLOGY    |
| acute fracture through the base of the distal phalanx.  Signed by:   |              |
| Gavin South  Sign Date/Time: 2019 5:15 PM                      |              |
+----------------------------------------------------------------------+--------------+
 
 
 
+------------------------------------------------------------------------+--------------+
| Narrative                                                              | Performed At |
+------------------------------------------------------------------------+--------------+
|   RIGHT TOE  CLINICAL INFORMATION:  Other (see comments) Pain, concern |   [facility]     |
|  for osteomyelitis.  COMPARISON:  XR FOOT LEFT (2018); XR FOOT   | RADIOLOGY    |
| LEFT (2018); XR FOOT LEFT  (2018);  FINDINGS:  Advanced     |              |
| degenerative changes identified involving the interphalangeal  joint   |              |
| of the 1st digit.    On the lateral view, there appears to be  lucency |              |
|  through the base of the phalanx, potentially reflecting an  acute     |              |
| fracture.    No erosive or destructive changes appreciated to  suggest |              |
|  osteomyelitis.                                                        |              |
 
+------------------------------------------------------------------------+--------------+
 
 
 
+------------------------------------------------------------------------+
| Procedure Note                                                         |
+------------------------------------------------------------------------+
|   Lauro Baldwin Results In - 2019  5:18 PM PST  RIGHT TOE             |
| CLINICAL INFORMATION:                                                  |
| Other (see comments) Pain, concern for osteomyelitis.                  |
| COMPARISON:                                                            |
| XR FOOT LEFT (2018); XR FOOT LEFT (2018); XR FOOT LEFT     |
| (2018);                                                           |
| FINDINGS:                                                              |
| Advanced degenerative changes identified involving the interphalangeal |
| joint of the 1st digit.  On the lateral view, there appears to be      |
| lucency through the base of the phalanx, potentially reflecting an     |
| acute fracture.  No erosive or destructive changes appreciated to      |
| suggest osteomyelitis.                                                 |
| IMPRESSION:                                                            |
| No radiographic evidence of osteomyelitis seen.  If further assessment |
| is warranted, consider correlation with MRI.                           |
| Potential acute fracture through the base of the distal phalanx.       |
| Signed by: Gavin South                                                 |
| Sign Date/Time: 2019 5:15 PM                                     |
+------------------------------------------------------------------------+
 
 
 
+--------------------+------------------+--------------------+--------------+
| Performing         | Address          | City/State/Zipcode | Phone Number |
| Organization       |                  |                    |              |
+--------------------+------------------+--------------------+--------------+
|   [facility] |   [address] | [place] [address] |              |
+--------------------+------------------+--------------------+--------------+
 Blood Culture Set 1 (2019  4:55 PM)Only the most recent of 2 results within the time 
period is included.
 
+----------------------+------------------+-----------+-----------------+
| Component            | Value            | Ref Range | Performed At    |
+----------------------+------------------+-----------+-----------------+
| Specimen Description | BLOOD, LINE DRAW |           | [facility]      |
|                      |                  |           | [facility]      |
+----------------------+------------------+-----------+-----------------+
| SPECIAL REQUESTS     | R FOREARM        |           | [facility] |
+----------------------+------------------+-----------+-----------------+
| CULTURE              | NO GROWTH 6 DAYS |           | [facility]      |
|                      |                  |           | LAB[facility]ATORY      |
+----------------------+------------------+-----------+-----------------+
 
 
 
+---------------------+
| Specimen            |
+---------------------+
| Blood - Blood Line  |
| Draw                |
+---------------------+
 
 
 
 
+-------------------+-------------------------+---------------------+----------------+
| Performing        | Address                 | City/State/Zipcode  | Phone Number   |
| Organization      |                         |                     |                |
+-------------------+-------------------------+---------------------+----------------+
|   [facility]      |   [address]  | [place] [address] |   [phone] |
| LABORATORY        | [address]                   |                     |                |
+-------------------+-------------------------+---------------------+----------------+
|   [facility] |   [address]        | [place] [address]  |                |
+-------------------+-------------------------+---------------------+----------------+
 ED INFORMATION EXCHANGE (2019  3:21 PM)Only the most recent of 3 results within the  period is included.
 
+------------------------------------------------------------------------+--------------+
| Narrative                                                              | Performed At |
+------------------------------------------------------------------------+--------------+
|   [id number]     Criteria Met         Care        |   ED         |
| Guidelines      10 in 12     Security and Safety  No recent Security   | INFORMATION  |
| Events currently on file     ED Care Guidelines from [employer] -        | EXCHANGE     |
| Antonella  Last Updated: 18 10:16 AM         Other Information:    |              |
| Currently being seen by [employer] in [place] for mental health        |              |
| [phone]  These are guidelines and the provider should    |              |
| exercise clinical judgment when providing care.  ED Care Guidelines    |              |
| from [facility]  Last Updated: 17 10:44 AM         Care |              |
|  Coordination:  This patient has been identified as having at          |              |
| leastEmergency Departments visits in the 12 months immediately         |              |
| preceding the date these guidelines were entered.Patient requires      |              |
| education on appropriate ED usage.Emphasize the importance of using    |              |
| outpatient   medical services for the treatment of chronic conditions. |              |
|   Please contact Community Health WorkerWillard at [phone] if   |              |
| patient is seen in ED.  These are guidelines and the provider should   |              |
| exercise clinical judgment when providing care.  These are guidelines  |              |
| and the provider should exercise clinical judgment when providing      |              |
| care.           Prescription Drug Report (12 Mo.)  PDMP query found no |              |
|  report.        E.D. Visit Count (12 mo.)  Facility Visits Low Acuity  |              |
|   [facility] 18 0   [facility] 2 0   [facility]   |              |
| [facility] 5 0   Total 25 0   Note: Visits indicate total |              |
|  known visits. Medicaid Low Acuity Dx are the number of primary        |              |
| diagnoses on the Medicaid's Low Acuity dx list.         Recent         |              |
| Emergency Department Visit Summary  Showing 10 most recent visits out  |              |
| of 25 in the past 12 months  Date Facility City State Type Diagnoses   |              |
| or Chief Complaint   2019 [facility] [place]       |              |
| Emergency       [facility] [place] [place]            |              |
| Emergency          Multiple Falls        Referral        Circulatory   |              |
| Problem      2019 [facility] [place] Emergency      |              |
|      ABD PAIN        Other chest pain      2019 [facility]         |              |
| [facility] [place] Emergency          Foot Pain      2019 [facility]              |
[facility] [place] [facility]          Chest Pain          |              |
| Nausea        Shortness of Breath        Chest pain, unspecified       |              |
|      Elevated blood-pressure reading, without diagnosis of             |              |
| hypertension        Presence of aortocoronary bypass graft             |              |
| Other specified postprocedural states      2019 [facility]  |              |
| Health [place] Emergency          CHEST PAIN        Abnormal levels  |              |
| of other serum enzymes        Personal history of other diseases of    |              |
| the circulatory system        Chest pain, unspecified      2019 |              |
|  [facility] [place] Emergency          FISTULA BLEEDING    |              |
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
| grafts, initial encounter      Dec 22, 2018 [facility]       |              |
| [place] Emergency          CHEST PAIN        Type 2 diabetes         |              |
| mellitus with hyperglycemia        Chest pain, unspecified      Nov    |              |
 
2018 [facility] [place] Emergency    Chief Complaint:   |              |
| SOB      2018 [facility] [place] Emergency         |              |
|     FALL        Unspecified fall, initial encounter        Dependence  |              |
| on renal dialysis        End stage renal disease            Recent     |              |
| Inpatient Visit Summary  Showing 10 most recent visits out of 11 in    |              |
| the past 12 months  Date Facility City State Type Diagnoses or Chief   |              |
| Complaint   [facility]       |              |
| Surgery          Foot Pain        End stage renal disease              |              |
| Dependence on renal dialysis        Peripheral vascular disease,       |              |
| unspecified        Anemia in chronic kidney disease        Other       |              |
| disorders of plasma-protein metabolism, not elsewhere classified       |              |
|      Other specified personal risk factors, not elsewhere classified   |              |
|     Dec [facility] 2018 [facility]               |              |
|     Peripheral arterial disease, osteomyelitis left foot        Other  |              |
| acute osteomyelitis, left ankle and foot        Long term (current)    |              |
| use of antibiotics        End stage renal disease        Anemia in     |              |
| chronic kidney disease      Dec 5, 2018 [facility] |              |
|  General Medicine          Peripheral vascular disease, unspecified    |              |
|      End stage renal disease        Dependence on renal dialysis       |              |
|      Long term (current) use of insulin        Other chronic           |              |
| osteomyelitis, left ankle and foot        Type 2 diabetes mellitus     |              |
| with diabetic chronic kidney disease        Essential (primary)        |              |
| hypertension      2018 [facility]          |              |
| Inpatient          Upper GIB        Other chronic osteomyelitis,       |              |
| unspecified ankle and foot        End stage renal disease        Other |              |
|  specified personal risk factors, not elsewhere classified             |              |
| Chronic systolic (congestive) heart failure        Anemia in chronic   |              |
| kidney disease        Other disorders of plasma-protein metabolism,    |              |
| not elsewhere classified        Moderate protein-calorie malnutrition  |              |
|        Other disorders of phosphorus metabolism      2018      |              |
| [facility]. [place] Medical Surgical          1. Type 2      |              |
| diabetes mellitus with other specified complication        2. Urinary  |              |
| tract infection, site not specified        3. Unspecified              |              |
| protein-calorie malnutrition        4. Other chronic osteomyelitis,    |              |
| unspecified site        5. Hypertensive heart and chronic kidney       |              |
| disease with heart failure and with stage 5 chronic kidney disease, or |              |
|  end stage renal disease        6. Chronic diastolic (congestive)      |              |
| heart failure        7. Other osteomyelitis, ankle and foot        8.  |              |
| Type 2 diabetes mellitus with foot ulcer        9. Atherosclerotic     |              |
| heart disease of native coronary artery without angina pectoris        |              |
|  10. Chronic obstructive pulmonary disease, unspecified      ,    |              |
[facility] [place] [facility]          1. Benign |              |
|  paroxysmal vertigo, unspecified ear        2. End stage renal disease |              |
|         3. Hypertensive chronic kidney disease with stage 5 chronic    |              |
| kidney disease or end stage renal disease        4. Urinary tract      |              |
| infection, site not specified        5. Hypertensive heart and chronic |              |
|  kidney disease with heart failure and with stage 5 chronic kidney     |              |
| disease, or end stage renal disease        6. Kidney transplant status |              |
|         7. Unspecified systolic (congestive) heart failure        8.   |              |
| Syncope and collapse        9. Type 2 diabetes mellitus with diabetic  |              |
| chronic kidney disease        10. Atherosclerotic heart disease of     |              |
| native coronary artery without angina pectoris      Sep 15, 2018       |              |
| [facility] [place]. [place] Medical Surgical          Acute     |              |
| ischemic heart disease, unspecified        Long term (current) use of  |              |
| insulin        Dependence on renal dialysis        End stage renal     |              |
| disease        Type 2 diabetes mellitus with diabetic chronic kidney   |              |
| disease        Essential (primary) hypertension        Anemia in       |              |
| chronic kidney disease      [facility] [place] |              |
|  [facility]          0. Cough        1. Pneumonia due to         |              |
| Pseudomonas        2. End stage renal disease        3. Hypertensive   |              |
 
| heart and chronic kidney disease with heart failure and with stage 5   |              |
| chronic kidney disease, or end stage renal disease        4. Cachexia  |              |
|        5. Chronic obstructive pulmonary disease with acute lower       |              |
| respiratory infection        6. Type 2 diabetes mellitus with diabetic |              |
|  chronic kidney disease        7. Heart failure, unspecified        8. |              |
|  Hyperlipidemia, unspecified        9. Gastro-esophageal reflux        |              |
| disease without esophagitis      2018 [place].       |              |
| [facility]          0. Other chest pain        1.      |              |
| Gastro-esophageal reflux disease without esophagitis        2. End     |              |
| stage renal disease        3. Hypertensive heart and chronic kidney    |              |
| disease with heart failure and with stage 5 chronic kidney disease, or |              |
|  end stage renal disease        4. Chronic systolic (congestive) heart |              |
|  failure        5. Atherosclerotic heart disease of native coronary    |              |
| artery with other forms of angina pectoris        6. Type 2 diabetes   |              |
| mellitus with diabetic chronic kidney disease        7.                |              |
| Hyperlipidemia, unspecified        8. Major depressive disorder,       |              |
| single episode, unspecified        9. Chronic obstructive pulmonary    |              |
| disease, unspecified      Mar 6, 2018 [facility]     |              |
| [facility] Card[facility]          Heart Failure        Need for iv access  |              |
|        Type 2 diabetes mellitus with other specified complication      |              |
|      Long term (current) use of insulin        Paroxysmal atrial       |              |
| fibrillation        Anemia in chronic kidney disease                   |              |
| Atherosclerotic heart disease of native coronary artery without angina |              |
|  pectoris        Cardiomyopathy, unspecified        End stage renal    |              |
| disease        Other specified postprocedural states            Care   |              |
| Providers  Provider PRC Type Phone Fax Service Dates   HEADINGS, SANTIAGO, |              |
|  F.N.P. Nurse Practitioner: Family     Current      Marco Antonio Martinez     |              |
| /Care Coordinator [phone]    2019 -          |              |
| Current      Marco Antonio Martinez Primary Care [phone]    2019 |              |
|  - Current      [facility] Primary            |              |
| Care    [phone] Current      [facility]      |              |
| [place] [facility]     Current      MANOLO GONZALEZ Primary Care         |              |
| Current      [facility] Provider [phone] |              |
|  [phone] Current      [employer] Case or Care Manager     Current      |              |
|  MIRTA Goel Case or Care Manager [phone]  |              |
| [phone] Current      Jairo Gutierrez MD Other     Current     |              |
|      [employer]  This patient has registered at the [facility]      |              |
| [facility] Emergency Department   For more information    |              |
| visit:                                                                 |              |
| [email or web address] |              |
| [id number]   The above information is provided for the sole purpose of     |              |
| patient treatment. Use of this information beyond the terms of Data    |              |
| Sharing Memorandum of Understanding and License Agreement is           |              |
| prohibited. In certain cases not all visits may be represented.        |              |
| Consult the aforementioned facilities for additional information.      |              |
| 2019 [employer] - [place], [place] -      |              |
| [email or web address]                                         |              |
+------------------------------------------------------------------------+--------------+
 
 
 
+-------------------------------------------------------------------------------------------
--------------------------------------------------------------------------------------------
--------------------+
| Procedure Note                                                                            
                                                                                            
                    |
+-------------------------------------------------------------------------------------------
--------------------------------------------------------------------------------------------
--------------------+
 
|   [place] - 2019  3:22 PM PST  Formatting of this note may be different      
                                                                                            
                    |
| from the original.[id number] [id number] Met  Care Guidelines  10 in     
                                                                                            
                    |
| 12Security and SafetyNo recent Security Events currently on fileED Care Guidelines from   
                                                                                            
                    |
| [employer] [facility] Updated: 18 10:16 AM  Other Information:Currently being      
                                                                                            
                    |
| seen by [employer] in [place] for mental health concerns.[phone] are            
                                                                                            
                    |
| guidelines and the provider should exercise clinical judgment when providing care.ED      
                                                                                            
                    |
| Care Guidelines from [facility] Updated: 17 10:44 AM  Care             
                                                                                            
                    |
| Coordination:This patient has been identified as having at leastEmergency Departments     
                                                                                            
                    |
| visits in the 12 months immediately preceding the date these guidelines were              
                                                                                            
                    |
| entered.Patient requires education on appropriate ED usage.Emphasize the importance of    
                                                                                            
                    |
| using outpatient medical services for the treatment of chronic conditions.Please contact  
                                                                                            
                    |
|  Community Health WorkerWillard at [phone] if patient is seen in ED.These are      
                                                                                            
                    |
| guidelines and the provider should exercise clinical judgment when providing care.These   
                                                                                            
                    |
| are guidelines and the provider should exercise clinical judgment when providing          
                                                                                            
                    |
| care.Prescription Drug Report (12 Mo.)PDMP query found no report.E.D. Visit Count (12     
                                                                                            
                    |
| mo.)Facility Visits Low Acuity [facility] 18 0 [facility] 2 0    
                                                                                            
                    |
| [facility] 5 0 Total 25 0 Note: Visits indicate total known visits.   
                                                                                            
                    |
| Medicaid Low Acuity Dx are the number of primary diagnoses on the Medicaid's Low Acuity   
                                                                                            
                    |
| dx list.  Recent Emergency Department Visit SummaryShowing 10 most recent visits out of   
                                                                                            
                    |
| 25 in the past 12 monthsDate Facility [facility] State Type Diagnoses or Chief Complaint Feb    
                                                                                            
                    |
 
| 2019 [facility] [place] Emergency   2019 [facility]     
                                                                                            
                    |
| [facility] Emergency    Multiple Falls    Referral    Circulatory Problem  2019     
                                                                                            
                    |
| [facility] [place] Emergency    ABD PAIN    Other chest pain  2019    
                                                                                            
                    |
| [facility] [place] Emergency    Foot Pain  2019 [facility] [place]ANE[place]  
                                                                                            
                    |
|  [facility] Emergency    Chest Pain    Nausea    Shortness of Breath    Chest pain,        
                                                                                            
                    |
| unspecified    Elevated blood-pressure reading, without diagnosis of hypertension         
                                                                                            
                    |
| Presence of aortocoronary bypass graft    Other specified postprocedural states  ,  
                                                                                            
                    |
|   [facility] [place] Emergency    CHEST PAIN    Abnormal levels of other  
                                                                                            
                    |
|  serum enzymes    Personal history of other diseases of the circulatory system    Chest   
                                                                                            
                    |
| pain, unspecified  2019 [facility] [place] Emergency    FISTULA        
                                                                                            
                    |
| BLEEDING    Hemorrhage due to vascular prosthetic devices, implants and grafts, initial   
                                                                                            
                    |
| encounter  Dec 22, 2018 [facility] [place] Emergency    CHEST PAIN    Type 2  
                                                                                            
                    |
|  diabetes mellitus with hyperglycemia    Chest pain, unspecified  2018 [facility]      
                                                                                            
                    |
| [facility] [place] Emergency  Chief Complaint: SOB  2018 [facility]       
                                                                                            
                    |
| [facility] [place] Emergency    FALL    Unspecified fall, initial encounter    Dependence   
                                                                                            
                    |
| on renal dialysis    End stage renal disease  Recent Inpatient Visit SummaryShowing 10    
                                                                                            
                    |
| most recent visits out of 11 in the past 12 monthsDate Facility City State Type           
                                                                                            
                    |
| Diagnoses or Chief Complaint 2019 [facility] [place] Vascular         
                                                                                            
                    |
| Surgery    Foot Pain    End stage renal disease    Dependence on renal dialysis           
                                                                                            
                    |
| Peripheral vascular disease, unspecified    Anemia in chronic kidney disease    Other     
                                                                                            
                    |
 
| disorders of plasma-protein metabolism, not elsewhere classified    Other specified       
                                                                                            
                    |
| personal risk factors, not elsewhere classified  Dec 27, 2018 [facility]ANE[facility]        
                                                                                            
                    |
| [facility]    Peripheral arterial disease, osteomyelitis left foot    Other       
                                                                                            
                    |
| acute osteomyelitis, left ankle and foot    Long term (current) use of antibiotics        
                                                                                            
                    |
| End stage renal disease    Anemia in chronic kidney disease  Dec 5, 2018 [facility]  
                                                                                            
                    |
  [facility]    Peripheral vascular disease, unspecified    End       
                                                                                            
                    |
| stage renal disease    Dependence on renal dialysis    Long term (current) use of         
                                                                                            
                    |
| insulin    Other chronic osteomyelitis, left ankle and foot    Type 2 diabetes mellitus   
                                                                                            
                    |
| with diabetic chronic kidney disease    Essential (primary) hypertension  2018    
                                                                                            
                    |
[facility]    Upper GIB    Other chronic osteomyelitis,     
                                                                                            
                    |
| unspecified ankle and foot    End stage renal disease    Other specified personal risk    
                                                                                            
                    |
| factors, not elsewhere classified    Chronic systolic (congestive) heart failure          
                                                                                            
                    |
| Anemia in chronic kidney disease    Other disorders of plasma-protein metabolism, not     
                                                                                            
                    |
| elsewhere classified    Moderate protein-calorie malnutrition    Other disorders of       
                                                                                            
                    |
| phosphorus metabolism  [facility]    1.  
                                                                                            
                    |
|  Type 2 diabetes mellitus with other specified complication    2. Urinary tract           
                                                                                            
                    |
| infection, site not specified    3. Unspecified protein-calorie malnutrition    4. Other  
                                                                                            
                    |
|  chronic osteomyelitis, unspecified site    5. Hypertensive heart and chronic kidney      
                                                                                            
                    |
| disease with heart failure and with stage 5 chronic kidney disease, or end stage renal    
                                                                                            
                    |
| disease    6. Chronic diastolic (congestive) heart failure    7. Other osteomyelitis,     
                                                                                            
                    |
 
| ankle and foot    8. Type 2 diabetes mellitus with foot ulcer    9. Atherosclerotic       
                                                                                            
                    |
| heart disease of native coronary artery without angina pectoris    10. Chronic            
                                                                                            
                    |
| obstructive pulmonary disease, unspecified  2018 [facility] MEGHAN Cox. [place]     
                                                                                            
                    |
| Medical Surgical    1. Benign paroxysmal vertigo, unspecified ear    2. End stage renal   
                                                                                            
                    |
| disease    3. Hypertensive chronic kidney disease with stage 5 chronic kidney disease or  
                                                                                            
                    |
|  end stage renal disease    4. Urinary tract infection, site not specified    5.          
                                                                                            
                    |
| Hypertensive heart and chronic kidney disease with heart failure and with stage 5         
                                                                                            
                    |
| chronic kidney disease, or end stage renal disease    6. Kidney transplant status    7.   
                                                                                            
                    |
| Unspecified systolic (congestive) heart failure    8. Syncope and collapse    9. Type 2   
                                                                                            
                    |
| diabetes mellitus with diabetic chronic kidney disease    10. Atherosclerotic heart       
                                                                                            
                    |
| disease of native coronary artery without angina pectoris  Sep 15, 2018 [facility]    
                                                                                            
                    |
| [place] Vivar. [place] Medical Surgical    Acute ischemic heart disease, unspecified         
                                                                                            
                    |
| Long term (current) use of insulin    Dependence on renal dialysis    End stage renal     
                                                                                            
                    |
| disease    Type 2 diabetes mellitus with diabetic chronic kidney disease    Essential     
                                                                                            
                    |
| (primary) hypertension    Anemia in chronic kidney disease  2018 [facility]            
                                                                                            
                    |
| [place]. [place] Medical Surgical    0. Cough    1. Pneumonia due to Pseudomonas   
                                                                                            
                    |
|    2. End stage renal disease    3. Hypertensive heart and chronic kidney disease with    
                                                                                            
                    |
| heart failure and with stage 5 chronic kidney disease, or end stage renal disease    4.   
                                                                                            
                    |
| Cachexia    5. Chronic obstructive pulmonary disease with acute lower respiratory         
                                                                                            
                    |
| infection    6. Type 2 diabetes mellitus with diabetic chronic kidney disease    7.       
                                                                                            
                    |
 
| Heart failure, unspecified    8. Hyperlipidemia, unspecified    9. Gastro-esophageal      
                                                                                            
                    |
| reflux disease without esophagitis  2018 [facility] Ak[place] Cox. [place] Medical     
                                                                                            
                    |
| Surgical    0. Other chest pain    1. Gastro-esophageal reflux disease without            
                                                                                            
                    |
| esophagitis    2. End stage renal disease    3. Hypertensive heart and chronic kidney     
                                                                                            
                    |
| disease with heart failure and with stage 5 chronic kidney disease, or end stage renal    
                                                                                            
                    |
| disease    4. Chronic systolic (congestive) heart failure    5. Atherosclerotic heart     
                                                                                            
                    |
| disease of native coronary artery with other forms of angina pectoris    6. Type 2        
                                                                                            
                    |
| diabetes mellitus with diabetic chronic kidney disease    7. Hyperlipidemia, unspecified  
                                                                                            
                    |
|     8. Major depressive disorder, single episode, unspecified    9. Chronic obstructive   
                                                                                            
                    |
| pulmonary disease, unspecified  Mar 6, 2018 [facility] JANEEN Ramsay. [place]        
                                                                                            
                    |
| Cardiology    Heart Failure    Need for iv access    Type 2 diabetes mellitus with other  
                                                                                            
                    |
|  specified complication    Long term (current) use of insulin    Paroxysmal atrial        
                                                                                            
                    |
| fibrillation    Anemia in chronic kidney disease    Atherosclerotic heart disease of      
                                                                                            
                    |
| native coronary artery without angina pectoris    Cardiomyopathy, unspecified    End      
                                                                                            
                    |
| stage renal disease    Other specified postprocedural states  Care [facility]idersProvider [facility]  
                                                                                            
                    |
|  Type Phone Fax Service Dates HEADINGS, ELZA HENDERSON Nurse Practitioner: Family           
                                                                                            
                    |
| Current  Marco Antonio Martinez /Care Coordinator [phone]  2019 -       
                                                                                            
                    |
| Current  [facility] [phone]  2019 - Current  [facility]        
                                                                                            
                    |
| [facility]  [phone] Current  [facility]  
                                                                                            
                    |
|  [facility] Primary Care   Current  MANOLO GONZALEZ Primary Care   Current  [employer],    
                                                                                            
                    |
 
| [employer] Mental Health Provider [phone] Current  [employer] Case or Care  
                                                                                            
                    |
|  Manager   Current  MIRTA Goel Case or Care Manager [phone]  
                                                                                            
                    |
|  [phone] Current  Jairo Gutierrez MD Other   Current  [employer]      
                                                                                            
                    |
| patient has registered at the [facility] For     
                                                                                            
                    |
| more information visit:                                                                   
                                                                                            
                    |
| [email or web address] The above    
                                                                                            
                    |
| information is provided for the sole purpose of patient treatment. Use of this            
                                                                                            
                    |
| information beyond the terms of Data Sharing Memorandum of Understanding and License      
                                                                                            
                    |
| Agreement is prohibited. In certain cases not all visits may be represented. Consult the  
                                                                                            
                    |
|  aforementioned facilities for additional information. 2019 [employer]            
                                                                                            
                    |
| [email or web address] - [place], [place] - [email or web address]                  
                                                                                            
                    |
|   End stage renal disease                                                                 
                                                                                            
                    |
|   Dependence on renal dialysis                                                            
                                                                                            
                    |
|   Long term (current) use of insulin                                                      
                                                                                            
                    |
|   Other chronic osteomyelitis, left ankle and foot                                        
                                                                                            
                    |
|   Type 2 diabetes mellitus with diabetic chronic kidney disease                           
                                                                                            
                    |
|   Essential (primary) hypertension                                                        
                                                                                            
                    |
|                                                                                           
                                                                                            
                    |
|[facility] [place] [facility]                                      
                                                                                            
                    |
|  Upper GIB                                                                                
                                                                                            
                    |
 
|   Other chronic osteomyelitis, unspecified ankle and foot                                 
                                                                                            
                    |
|   End stage renal disease                                                                 
                                                                                            
                    |
|   Other specified personal risk factors, not elsewhere classified                         
                                                                                            
                    |
|   Chronic systolic (congestive) heart failure                                             
                                                                                            
                    |
|   Anemia in chronic kidney disease                                                        
                                                                                            
                    |
|   Other disorders of plasma-protein metabolism, not elsewhere classified                  
                                                                                            
                    |
|   Moderate protein-calorie malnutrition                                                   
                                                                                            
                    |
|   Other disorders of phosphorus metabolism                                                
                                                                                            
                    |
|                                                                                           
                                                                                            
                    |
|2018 [place]. [place] Medical Surgical                                
                                                                                            
                    |
|  1. Type 2 diabetes mellitus with other specified complication                            
                                                                                            
                    |
|   2. Urinary tract infection, site not specified                                          
                                                                                            
                    |
|   3. Unspecified protein-calorie malnutrition                                             
                                                                                            
                    |
|   4. Other chronic osteomyelitis, unspecified site                                        
                                                                                            
                    |
|   5. 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
onic kidney disease, or end stage renal disease                                             
                    |
|   6. Chronic diastolic (congestive) heart failure                                         
                                                                                            
                    |
|   7. Other osteomyelitis, ankle and foot                                                  
                                                                                            
                    |
|   8. Type 2 diabetes mellitus with foot ulcer                                             
                                                                                            
                    |
|   9. Atherosclerotic heart disease of native coronary artery without angina pectoris      
                                                                                            
                    |
|   10. Chronic obstructive pulmonary disease, unspecified                                  
                                                                                            
                    |
 
|                                                                                           
                                                                                            
                    |
|2018 [place]. [place] [facility]                                 
                                                                                            
                    |
|  1. Benign paroxysmal vertigo, unspecified ear                                            
                                                                                            
                    |
|   2. End stage renal disease                                                              
                                                                                            
                    |
|   3. Hypertensive chronic kidney disease with stage 5 chronic kidney disease or end stage 
renal disease                                                                               
                    |
|   4. Urinary tract infection, site not specified                                          
                                                                                            
                    |
|   5. 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
onic kidney disease, or end stage renal disease                                             
                    |
|   6. Kidney transplant status                                                             
                                                                                            
                    |
|   7. Unspecified systolic (congestive) heart failure                                      
                                                                                            
                    |
|   8. Syncope and collapse                                                                 
                                                                                            
                    |
|   9. Type 2 diabetes mellitus with diabetic chronic kidney disease                        
                                                                                            
                    |
|   10. Atherosclerotic heart disease of native coronary artery without angina pectoris     
                                                                                            
                    |
|                                                                                           
                                                                                            
                    |
|Sep 15, 2018 [facility] JANEEN Vivar. [place] Medical Surgical                           
                                                                                            
                    |
|  Acute ischemic heart disease, unspecified                                                
                                                                                            
                    |
|   Long term (current) use of insulin                                                      
                                                                                            
                    |
|   Dependence on renal dialysis                                                            
                                                                                            
                    |
|   End stage renal disease                                                                 
                                                                                            
                    |
|   Type 2 diabetes mellitus with diabetic chronic kidney disease                           
                                                                                            
                    |
|   Essential (primary) hypertension                                                        
                                                                                            
                    |
 
|   Anemia in chronic kidney disease                                                        
                                                                                            
                    |
|                                                                                           
                                                                                            
                    |
|2018 [facility] Akash Cox. [place] Medical Surgical                                
                                                                                            
                    |
|  0. Cough                                                                                 
                                                                                            
                    |
|   1. Pneumonia due to Pseudomonas                                                         
                                                                                            
                    |
|   2. End stage renal disease                                                              
                                                                                            
                    |
|   3. 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
onic kidney disease, or end stage renal disease                                             
                    |
|   4. Cachexia                                                                             
                                                                                            
                    |
|   5. Chronic obstructive pulmonary disease with acute lower respiratory infection         
                                                                                            
                    |
|   6. Type 2 diabetes mellitus with diabetic chronic kidney disease                        
                                                                                            
                    |
|   7. Heart failure, unspecified                                                           
                                                                                            
                    |
|   8. Hyperlipidemia, unspecified                                                          
                                                                                            
                    |
|   9. Gastro-esophageal reflux disease without esophagitis                                 
                                                                                            
                    |
|                                                                                           
                                                                                            
                    |
|2018 [facility]. [place] [facility]                                 
                                                                                            
                    |
|  0. Other chest pain                                                                      
                                                                                            
                    |
|   1. Gastro-esophageal reflux disease without esophagitis                                 
                                                                                            
                    |
|   2. End stage renal disease                                                              
                                                                                            
                    |
|   3. 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
onic kidney disease, or end stage renal disease                                             
                    |
|   4. Chronic systolic (congestive) heart failure                                          
                                                                                            
                    |
 
|   5. 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
ctoris                                                                                      
                    |
|   6. Type 2 diabetes mellitus with diabetic chronic kidney disease                        
                                                                                            
                    |
|   7. Hyperlipidemia, unspecified                                                          
                                                                                            
                    |
|   8. Major depressive disorder, single episode, unspecified                               
                                                                                            
                    |
|   9. Chronic obstructive pulmonary disease, unspecified                                   
                                                                                            
                    |
|                                                                                           
                                                                                            
                    |
|Mar 6, 2018 [facility]. [place] [facility]                              
                                                                                            
                    |
|  Heart Failure                                                                            
                                                                                            
                    |
|   Need for iv access                                                                      
                                                                                            
                    |
|   Type 2 diabetes mellitus with other specified complication                              
                                                                                            
                    |
|   Long term (current) use of insulin                                                      
                                                                                            
                    |
|   Paroxysmal atrial fibrillation                                                          
                                                                                            
                    |
|   Anemia in chronic kidney disease                                                        
                                                                                            
                    |
|   Atherosclerotic heart disease of native coronary artery without angina pectoris         
                                                                                            
                    |
|   Cardiomyopathy, unspecified                                                             
                                                                                            
                    |
|   End stage renal disease                                                                 
                                                                                            
                    |
|   Other specified postprocedural states                                                   
                                                                                            
                    |
|                                                                                           
                                                                                            
                    |
|                                                                                           
                                                                                            
                    |
|                                                                                           
                                                                                            
                    |
 
|Care Providers                                                                             
                                                                                            
                    |
|Provider PRC Type Phone Fax Service Dates                                                  
                                                                                            
                    |
|HEADINGS, SANTIAGO F.N.P. Nurse Practitioner: Family   Current                                
                                                                                            
                    |
|Marco Antonio Martinez /Care Coordinator [phone]  2019 - Current         
                                                                                            
                    |
|[facility] Primary [facility] [phone]  2019 - Current                          
                                                                                            
                    |
|[facility]  [phone] Current                   
                                                                                            
                    |
|[facility] Primary Care   Current                                
                                                                                            
                    |
|[facility] GONZALEZ Primary Care   Current                                                        
                                                                                            
                    |
|[facility] Provider [phone] Current                 
                                                                                            
                    |
|[facility] Case or Care Manager   Current                                                   
                                                                                            
                    |
|MIRTA Goel Case or Care Manager [phone] Current
                                                                                            
                    |
|Jairo Gutierrez MD Other   Current                                                       
                                                                                            
                    |
|                                                                                           
                                                                                            
                    |
|Collective Portal                                                                          
                                                                                            
                    |
|This patient has registered at the [facility]     
                                                                                            
                    |
|For more information visit: [email or web address]
[email or web address]                                                                               
                    |
|The above information is provided for the sole purpose of patient treatment. Use of this in
formation beyond the terms of Data Sharing Memorandum of Understanding and License Agreement
 is prohibited. In  |
|certain cases not all visits may be represented. Consult the aforementioned facilities for 
additional information.                                                                     
                    |
|2019 [employer] - [place], [place] - [email or web address]                                                                                       
                    |
+-------------------------------------------------------------------------------------------
--------------------------------------------------------------------------------------------
--------------------+
 
 
 
 
+-------------------+---------+--------------------+--------------+
| Performing        | Address | City/State/Zipcode | Phone Number |
| Organization      |         |                    |              |
+-------------------+---------+--------------------+--------------+
|   ED INFORMATION  |         |                    |              |
| EXCHANGE          |         |                    |              |
+-------------------+---------+--------------------+--------------+
 Phosphorus (2019  5:38 AM)
 
+------------+--------------------------+-----------------+--------------+
| Component  | Value                    | Ref Range       | Performed At |
+------------+--------------------------+-----------------+--------------+
| PHOSPHORUS | 3.6Comment: Testing      | 2.3 - 4.8 mg/dL | [facility]   |
|            | performed at [facility], [address] |                 | LABORATORY   |
|            |  [facility],        |                 |              |
|            | [place]  [address]     |                 |              |
+------------+--------------------------+-----------------+--------------+
 
 
 
+----------+
| Specimen |
+----------+
| Blood    |
+----------+
 
 
 
+---------------+-------------------------+---------------------+----------------+
| Performing    | Address                 | City/State/Zipcode  | Phone Number   |
| Organization  |                         |                     |                |
+---------------+-------------------------+---------------------+----------------+
|   [facility]  |   [address] [place] [address]  | [place] [address] |   [phone] |
| LABORATORY    | [address]vd[address]                   |                     |                |
+---------------+-------------------------+---------------------+----------------+
 Magnesium (2019  5:38 AM)
 
+-----------+--------------------------+-----------------+--------------+
| Component | Value                    | Ref Range       | Performed At |
+-----------+--------------------------+-----------------+--------------+
| MAGNESIUM | 2.3Comment: Testing      | 1.7 - 2.4 mg/dL | [facility]   |
|           | performed at [facility], [facility] [address] |                 | LABORATORY   |
|           |  [facility],        |                 |              |
|           | [place]  [address]     |                 |              |
+-----------+--------------------------+-----------------+--------------+
 
 
 
+----------+
| Specimen |
+----------+
| Blood    |
+----------+
 
 
 
+---------------+-------------------------+---------------------+----------------+
 
| Performing    | Address                 | City/State/Zipcode  | Phone Number   |
| Organization  |                         |                     |                |
+---------------+-------------------------+---------------------+----------------+
|   [facility]  |   [address] [facility]  | [place] [address] |   [phone] |
| LABORATORY    | [address]vd[address]                   |                     |                |
+---------------+-------------------------+---------------------+----------------+
 Basic metabolic panel (2019  5:38 AM)Only the most recent of 2 results within the rebekah
e period is included.
 
+----------------+--------------------------+-------------------+--------------+
| Component      | Value                    | Ref Range         | Performed At |
+----------------+--------------------------+-------------------+--------------+
| SODIUM         | 139                      | 135 - 145 mmol/L  | [facility]   |
|                |                          |                   | LABORATORY   |
+----------------+--------------------------+-------------------+--------------+
| POTASSIUM      | 3.9                      | 3.5 - 4.9 mmol/L  | [facility]   |
|                |                          |                   | LABORATORY   |
+----------------+--------------------------+-------------------+--------------+
| CHLORIDE       | 97 (L)                   | 99 - 109 mmol/L   | [facility]   |
|                |                          |                   | LABORATORY   |
+----------------+--------------------------+-------------------+--------------+
| CO2            | 31                       | 23 - 32 mmol/L    | [facility]   |
|                |                          |                   | LABORATORY   |
+----------------+--------------------------+-------------------+--------------+
| ANION GAP AGAP | 15                       | 5 - 20 mmol/L     | [facility]   |
|                |                          |                   | LABORATORY   |
+----------------+--------------------------+-------------------+--------------+
| GLUCOSE        | 169 (H)                  | 65 - 99 mg/dL     | [facility]   |
|                |                          |                   | LABORATORY   |
+----------------+--------------------------+-------------------+--------------+
| BUN            | 13                       | 8 - 25 mg/dL      | [facility]   |
|                |                          |                   | LABORATORY   |
+----------------+--------------------------+-------------------+--------------+
| CREATININE     | 4.8 (H)                  | 0.50 - 1.00 mg/dL | [facility]   |
|                |                          |                   | LABORATORY   |
+----------------+--------------------------+-------------------+--------------+
| BUN/CREAT      | 3                        |                   | [facility]   |
|                |                          |                   | LABORATORY   |
+----------------+--------------------------+-------------------+--------------+
| CALCIUM        | 9.4                      | 8.5 - 10.5 mg/dL  | [facility]   |
|                |                          |                   | LABORATORY   |
+----------------+--------------------------+-------------------+--------------+
| EGFR           | 9 (L)Comment: GFR <60:   | >60 mL/min/1.73m2 | [facility]   |
|                | CHRONIC KIDNEY DISEASE,  |                   | LABORATORY   |
|                | IF FOUND OVER A 3 MONTH  |                   |              |
|                | PERIOD.GFR <15: KIDNEY   |                   |              |
|                | FAILURE.FOR       |                   |              |
|                | [employer]IC[employer], MULTIPLY THE  |                   |              |
|                | CALCULATED GFR BY        |                   |              |
|                | 1.210.This eGFR is       |                   |              |
|                | calculated using the     |                   |              |
|                | MDRD IDMS traceable      |                   |              |
|                | equation.Testing         |                   |              |
|                | performed at [facility], [address] |                   |              |
|                |  [facility],        |                   |              |
|                | [place]    [address]   |                   |              |
+----------------+--------------------------+-------------------+--------------+
 
 
 
 
+----------+
| Specimen |
+----------+
| Blood    |
+----------+
 
 
 
+---------------+-------------------------+---------------------+----------------+
| Performing    | Address                 | City/State/Zipcode  | Phone Number   |
| Organization  |                         |                     |                |
+---------------+-------------------------+---------------------+----------------+
|   [facility]  |   [address]  | [place] [address] |   [phone] |
| LABORATORY    | [address]vd[address]                   |                     |                |
+---------------+-------------------------+---------------------+----------------+
 EKG STANDARD 12 LEAD (2019  6:18 AM)
 
+-------------------+--------------------------+-----------+--------------+
| Component         | Value                    | Ref Range | Performed At |
+-------------------+--------------------------+-----------+--------------+
| Ventricular Rate  | 73                       | BPM       | [facility] E[facility]     |
+-------------------+--------------------------+-----------+--------------+
| Atrial Rate       | 73                       | BPM       | [facility]     |
+-------------------+--------------------------+-----------+--------------+
| P-R Interval      | 146                      | ms        | [facility]     |
+-------------------+--------------------------+-----------+--------------+
| QRS Duration      | 128                      | ms        | [facility]     |
+-------------------+--------------------------+-----------+--------------+
| Q-T Interval      | 464                      | ms        | [facility] E[facility]     |
+-------------------+--------------------------+-----------+--------------+
| QTC Calculation   | 511                      | ms        | [facility] E[facility]     |
| (Bezet)           |                          |           |              |
+-------------------+--------------------------+-----------+--------------+
| Calculated P Axis | 70                       | degrees   | [facility] E[facility]     |
+-------------------+--------------------------+-----------+--------------+
| Calculated R Axis | -38                      | degrees   | [facility] EKG     |
+-------------------+--------------------------+-----------+--------------+
| Calculated T Axis | 9                        | degrees   | [facility] EKG     |
+-------------------+--------------------------+-----------+--------------+
| Diagnosis         | Normal sinus             |           | [facility]     |
|                   | rhythmPossible Left      |           |              |
|                   | atrial enlargementLeft   |           |              |
|                   | axis                     |           |              |
|                   | deviationNon-specific    |           |              |
|                   | intra-ventricular        |           |              |
|                   | conduction blockCannot   |           |              |
|                   | rule out Septal infarct  |           |              |
|                   | , age                    |           |              |
|                   | undeterminedAbnormal     |           |              |
|                   | ECGWhen compared with    |           |              |
|                   | ECG of 2019       |           |              |
|                   | 05:50,Minimal criteria   |           |              |
|                   | for Septal infarct are   |           |              |
|                   | now PresentConfirmed by  |           |              |
|                   | EN FRANK ([id number]) on   |           |              |
|                   | 2019 12:03:10 PM    |           |              |
+-------------------+--------------------------+-----------+--------------+
 
 
 
 
+--------------+-------------------+--------------------+--------------+
| Performing   | Address           | City/State/Zipcode | Phone Number |
| Organization |                   |                    |              |
+--------------+-------------------+--------------------+--------------+
|   [facility]   |   [address] | [place] [address] |              |
+--------------+-------------------+--------------------+--------------+
 IR stent femoral popliteal (2019  6:45 PM)
 
+------------------------------------------------------------------------+--------------+
| Impressions                                                            | Performed At |
+------------------------------------------------------------------------+--------------+
|      1. Aorta and iliac arteries were calcified but without            |   [facility]     |
| significant stenosis.  2. Right SFA with high-grade stenosis           | RADIOLOGY    |
| proximally with good endograft results after angioplasty and stenting. |              |
|   3. 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
| artery to right foot.  4. Right posterior tibial artery was            |              |
| chronically occluded.     Electronically signed by Kirt Mclaughlin MD on    |              |
| 2019 3:50 PM                                                      |              |
+------------------------------------------------------------------------+--------------+
 
 
 
+------------------------------------------------------------------------+--------------+
| Narrative                                                              | Performed At |
+------------------------------------------------------------------------+--------------+
|   LOWER EXTREMITY ARTERIOGRAM, UNILATERAL     PREOPERATIVE             |   [facility]     |
| DIAGNOSIS:    Critical limb ischemia of right lower extremity with     | RADIOLOGY    |
| poorly healing wound of right great toe     POSTOPERATIVE              |              |
| DIAGNOSIS:    Same     PROCEDURE:  1. Moderate conscious sedation  2.  |              |
| Ultrasound guided access of the left common femoral artery  3.         |              |
| Aortogram  4. Selective right common femoral artery catheterization    |              |
| with right leg runoff  5. Right SFA stenting using 6 x 80 mm           |              |
| self-expanding stent  6 Drug eluting balloon angioplasty of right SFA  |              |
| using 4 x 100 mm Lutonix balloon     SURGEON: Kirt Mclaughlin MD              |              |
| ASSISTANT: None     ANESTHESIA: Moderate sedation and local anesthesia |              |
|      Informed consent was obtained from the patient. Continuous        |              |
| cardiac monitoring was performed throughout the procedure.  Conscious  |              |
| sedation was provided by the nursing staff during the procedure under  |              |
| my supervision.  Sedation time: 34 minutes  Medications: 2 mg Versed   |              |
| IV, 50 mcg Fentanyl IV     ESTIMATED BLOOD LOSS: Minimal               |              |
| CONTRAST:    53 mL of Isovue 250     INDICATIONS:    See preoperative  |              |
| history and physical     FINDINGS:    Aorta and iliac arteries         |              |
| calcified but without significant stenosis. Right SFA with high-grade  |              |
| stenosis proximally. After angioplasty and stenting, good angiographic |              |
|  results. Good two-vessel runoff to right foot via right anterior      |              |
| tibial artery and peroneal artery. Right posterior tibial artery was   |              |
| chronically occluded.     DESCRIPTION OF PROCEDURE:    The patient was |              |
|  properly identified and brought to the Cath Lab. The patient was      |              |
| placed supine on the catheter table and patient was given moderate     |              |
| sedation by nursing staff under my supervision. Patient's bilateral    |              |
| groins were then prepped and draped in the usual sterile fashion.      |              |
| Local anesthetic was given to the left groin and the left common       |              |
| femoral artery was accessed under ultrasound guidance using a          |              |
| micropuncture needle. A micropuncture sheath was inserted over a wire  |              |
| and up sized to a 4 French sheath. A Omni flush catheter was then      |              |
| inserted into the distal aorta and aortogram was then performed with   |              |
| the findings as described above. The Omni Flush catheter was then used |              |
|  to guide a Glidewire into the right common femoral artery and then    |              |
| the catheter was then advanced over the wire. A right lower extremity  |              |
| runoff was then performed with the findings as described above.        |              |
 
| Next, an Amplatzer wire was then inserted over the catheter and the 4  |              |
| French sheath was removed. A 6 French destination sheath was then      |              |
| inserted over the wire with the tip of the sheath being placed into    |              |
| the right common femoral artery. A vertebral catheter was inserted     |              |
| over the wire and the wire was then removed. A Glidewire was then      |              |
| placed into the vertebral catheter and used to cross through the       |              |
| stenotic right SFA.    Next, a 260 fix core wire was then inserted     |              |
| over the catheter.    Right SFA was stented using 6 x 80 mm            |              |
| self-expanding stent. Drug eluting balloon angioplasty of the right    |              |
| SFA was then performed using 4 x 100 mm Lutonix balloon.     A final   |              |
| arteriogram was then performed through the sheath. The destination     |              |
| sheath was then removed and a Mynx closure device was then used on the |              |
|  left common femoral artery and hemostasis was ensured. The patient    |              |
| was then taken to the observation unit for further monitoring.         |              |
+------------------------------------------------------------------------+--------------+
 
 
 
+-------------------------------------------------------------------------------------------
--------------------------------------------------------------------------------------------
-----------------------------------+
| Procedure Note                                                                            
                                                                                            
                                   |
+-------------------------------------------------------------------------------------------
--------------------------------------------------------------------------------------------
-----------------------------------+
|   Denny, Rad Results In - 2019  8:40 AM PST  LOWER EXTREMITY ARTERIOGRAM,             
                                                                                            
                                   |
| UNILATERALPREOPERATIVE DIAGNOSIS:  Critical limb ischemia of right lower extremity with   
                                                                                            
                                   |
| poorly healing wound of right great toePOSTOPERATIVE DIAGNOSIS:  SamePROCEDURE:1.         
                                                                                            
                                   |
| Moderate conscious sedation2. Ultrasound guided access of the left common femoral         
                                                                                            
                                   |
| artery3. Aortogram4. Selective right common femoral artery catheterization with right     
                                                                                            
                                   |
| leg runoff5. Right SFA stenting using 6 x 80 mm self-expanding stent6 Drug eluting        
                                                                                            
                                   |
| balloon angioplasty of right SFA using 4 x 100 mm Lutonix balloonSURGEON: Kirt Mclaughlin,        
                                                                                            
                                   |
| MDASSISTANT: NoneANESTHESIA: Moderate sedation and local anesthesiaInformed consent was   
                                                                                            
                                   |
| obtained from the patient. Continuous cardiac monitoring was performed throughout the     
                                                                                            
                                   |
| procedure.Conscious sedation was provided by the nursing staff during the procedure       
                                                                                            
                                   |
| under my supervision.Sedation time: 34 minutesMedications: 2 mg Versed IV, 50 mcg         
                                                                                            
                                   |
 
| Fentanyl IVESTIMATED BLOOD LOSS: MinimalCONTRAST:  53 mL of Isovue 250INDICATIONS:  See   
                                                                                            
                                   |
| preoperative history and physicalFINDINGS:  Aorta and iliac arteries calcified but        
                                                                                            
                                   |
| without significant stenosis. Right SFA with high-grade stenosis proximally. After        
                                                                                            
                                   |
| angioplasty and stenting, good angiographic results. Good two-vessel runoff to right      
                                                                                            
                                   |
| foot via right anterior tibial artery and peroneal artery. Right posterior tibial artery  
                                                                                            
                                   |
|  was chronically occluded.DESCRIPTION OF PROCEDURE:  The patient was properly identified  
                                                                                            
                                   |
|  and brought to the Cath Lab. The patient was placed supine on the catheter table and     
                                                                                            
                                   |
| patient was given moderate sedation by nursing staff under my supervision. Patient's      
                                                                                            
                                   |
| bilateral groins were then prepped and draped in the usual sterile fashion. Local         
                                                                                            
                                   |
| anesthetic was given to the left groin and the left common femoral artery was accessed    
                                                                                            
                                   |
| under ultrasound guidance using a micropuncture needle. A micropuncture sheath was        
                                                                                            
                                   |
| inserted over a wire and up sized to a 4 French sheath. A Omni flush catheter was then    
                                                                                            
                                   |
| inserted into the distal aorta and aortogram was then performed with the findings as      
                                                                                            
                                   |
| described above. The Omni Flush catheter was then used to guide a Glidewire into the      
                                                                                            
                                   |
| right common femoral artery and then the catheter was then advanced over the wire. A      
                                                                                            
                                   |
| right lower extremity runoff was then performed with the findings as described            
                                                                                            
                                   |
| above.Next, an Amplatzer wire was then inserted over the catheter and the 4 French        
                                                                                            
                                   |
| sheath was removed. A 6 French destination sheath was then inserted over the wire with    
                                                                                            
                                   |
| the tip of the sheath being placed into the right common femoral artery. A vertebral      
                                                                                            
                                   |
| catheter was inserted over the wire and the wire was then removed. A Glidewire was then   
                                                                                            
                                   |
 
| placed into the vertebral catheter and used to cross through the stenotic right SFA.      
                                                                                            
                                   |
| Next, a 260 fix core wire was then inserted over the catheter.  Right SFA was stented     
                                                                                            
                                   |
| using 6 x 80 mm self-expanding stent. Drug eluting balloon angioplasty of the right SFA   
                                                                                            
                                   |
| was then performed using 4 x 100 mm Lutonix balloon.A final arteriogram was then          
                                                                                            
                                   |
| performed through the sheath. The destination sheath was then removed and a Mynx closure  
                                                                                            
                                   |
|  device was then used on the left common femoral artery and hemostasis was ensured. The   
                                                                                            
                                   |
| patient was then taken to the observation unit for further monitoring.IMPRESSION:1.       
                                                                                            
                                   |
| Aorta and iliac arteries were calcified but without significant stenosis.2. Right SFA     
                                                                                            
                                   |
| with high-grade stenosis proximally with good endograft results after angioplasty and     
                                                                                            
                                   |
| stenting.3. Two-vessel runoff via right anterior tibial artery and peroneal artery to     
                                                                                            
                                   |
| right foot.4. 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       
                                                                                            
                                   |
| signed by Kirt Mclaughlin MD on 2019 3:50 PM                                              
                                                                                            
                                   |
|                                                                                           
                                                                                            
                                   |
|A final arteriogram was then performed through the sheath. The destination sheath was then 
removed and a Mynx closure device was then used on the left common femoral artery and hemost
asis was ensured. The patient was  |
|then taken to the observation unit for further monitoring.                                 
                                                                                            
                                   |
|                                                                                           
                                                                                            
                                   |
|IMPRESSION:                                                                                
                                                                                            
                                  |
|                                                                                           
                                                                                            
                                   |
|1. Aorta and iliac arteries were calcified but without significant stenosis.               
                                                                                            
                                   |
|2. Right SFA with high-grade stenosis proximally with good endograft results after angiopla
sty and stenting.                                                                           
                                   |
 
|3. Two-vessel runoff via right anterior tibial artery and peroneal artery to right foot.   
                                                                                            
                                   |
|4. 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                                 
                                                                                            
                                   |
|                                                                                           
                                                                                            
                                   |
|Electronically signed by Kirt Mclaughlin MD on 2019 3:50 PM                                
                                                                                            
                                   |
+-------------------------------------------------------------------------------------------
--------------------------------------------------------------------------------------------
-----------------------------------+
 
 
 
+--------------------+------------------+--------------------+--------------+
| Performing         | Address          | City/State/Zipcode | Phone Number |
| Organization       |                  |                    |              |
+--------------------+------------------+--------------------+--------------+
|   [facility] |   [address] | [place] [address] |              |
+--------------------+------------------+--------------------+--------------+
 IR angiogram extremity right (2019  6:45 PM)
 
+------------------------------------------------------------------------+--------------+
| Impressions                                                            | Performed At |
+------------------------------------------------------------------------+--------------+
|      1. Aorta and iliac arteries were calcified but without            |   [facility]     |
| significant stenosis.  2. Right SFA with high-grade stenosis           | RADIOLOGY    |
| proximally with good endograft results after angioplasty and stenting. |              |
|   3. 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
| artery to right foot.  4. Right posterior tibial artery was            |              |
| chronically occluded.     Electronically signed by Kirt Mclaughlin MD on    |              |
| 2019 3:50 PM                                                      |              |
+------------------------------------------------------------------------+--------------+
 
 
 
+------------------------------------------------------------------------+--------------+
| Narrative                                                              | Performed At |
+------------------------------------------------------------------------+--------------+
|   LOWER EXTREMITY ARTERIOGRAM, UNILATERAL     PREOPERATIVE             |   [facility]     |
| DIAGNOSIS:    Critical limb ischemia of right lower extremity with     | RADIOLOGY    |
| poorly healing wound of right great toe     POSTOPERATIVE              |              |
| DIAGNOSIS:    Same     PROCEDURE:  1. Moderate conscious sedation  2.  |              |
| Ultrasound guided access of the left common femoral artery  3.         |              |
| Aortogram  4. Selective right common femoral artery catheterization    |              |
| with right leg runoff  5. Right SFA stenting using 6 x 80 mm           |              |
| self-expanding stent  6 Drug eluting balloon angioplasty of right SFA  |              |
| using 4 x 100 mm Lutonix balloon     SURGEON: Kirt Mclaughlin MD              |              |
| ASSISTANT: None     ANESTHESIA: Moderate sedation and local anesthesia |              |
|      Informed consent was obtained from the patient. Continuous        |              |
| cardiac monitoring was performed throughout the procedure.  Conscious  |              |
| sedation was provided by the nursing staff during the procedure under  |              |
| my supervision.  Sedation time: 34 minutes  Medications: 2 mg Versed   |              |
| IV, 50 mcg Fentanyl IV     ESTIMATED BLOOD LOSS: Minimal               |              |
| CONTRAST:    53 mL of Isovue 250     INDICATIONS:    See preoperative  |              |
| history and physical     FINDINGS:    Aorta and iliac arteries         |              |
 
| calcified but without significant stenosis. Right SFA with high-grade  |              |
| stenosis proximally. After angioplasty and stenting, good angiographic |              |
|  results. Good two-vessel runoff to right foot via right anterior      |              |
| tibial artery and peroneal artery. Right posterior tibial artery was   |              |
| chronically occluded.     DESCRIPTION OF PROCEDURE:    The patient was |              |
|  properly identified and brought to the Cath Lab. The patient was      |              |
| placed supine on the catheter table and patient was given moderate     |              |
| sedation by nursing staff under my supervision. Patient's bilateral    |              |
| groins were then prepped and draped in the usual sterile fashion.      |              |
| Local anesthetic was given to the left groin and the left common       |              |
| femoral artery was accessed under ultrasound guidance using a          |              |
| micropuncture needle. A micropuncture sheath was inserted over a wire  |              |
| and up sized to a 4 French sheath. A Omni flush catheter was then      |              |
| inserted into the distal aorta and aortogram was then performed with   |              |
| the findings as described above. The Omni Flush catheter was then used |              |
|  to guide a Glidewire into the right common femoral artery and then    |              |
| the catheter was then advanced over the wire. A right lower extremity  |              |
| runoff was then performed with the findings as described above.        |              |
| Next, an Amplatzer wire was then inserted over the catheter and the 4  |              |
| French sheath was removed. A 6 French destination sheath was then      |              |
| inserted over the wire with the tip of the sheath being placed into    |              |
| the right common femoral artery. A vertebral catheter was inserted     |              |
| over the wire and the wire was then removed. A Glidewire was then      |              |
| placed into the vertebral catheter and used to cross through the       |              |
| stenotic right SFA.    Next, a 260 fix core wire was then inserted     |              |
| over the catheter.    Right SFA was stented using 6 x 80 mm            |              |
| self-expanding stent. Drug eluting balloon angioplasty of the right    |              |
| SFA was then performed using 4 x 100 mm Lutonix balloon.     A final   |              |
| arteriogram was then performed through the sheath. The destination     |              |
| sheath was then removed and a Mynx closure device was then used on the |              |
|  left common femoral artery and hemostasis was ensured. The patient    |              |
| was then taken to the observation unit for further monitoring.         |              |
+------------------------------------------------------------------------+--------------+
 
 
 
+-------------------------------------------------------------------------------------------
--------------------------------------------------------------------------------------------
-----------------------------------+
| Procedure Note                                                                            
                                                                                            
                                   |
+-------------------------------------------------------------------------------------------
--------------------------------------------------------------------------------------------
-----------------------------------+
|   Lauro Baldwin Results In - 2019  8:40 AM PST  LOWER EXTREMITY ARTERIOGRAM,             
                                                                                            
                                   |
| UNILATERALPREOPERATIVE DIAGNOSIS:  Critical limb ischemia of right lower extremity with   
                                                                                            
                                   |
| poorly healing wound of right great toePOSTOPERATIVE DIAGNOSIS:  SamePROCEDURE:1.         
                                                                                            
                                   |
| Moderate conscious sedation2. Ultrasound guided access of the left common femoral         
                                                                                            
                                   |
| artery3. Aortogram4. Selective right common femoral artery catheterization with right     
                                                                                            
                                   |
 
| leg runoff5. Right SFA stenting using 6 x 80 mm self-expanding stent6 Drug eluting        
                                                                                            
                                   |
| balloon angioplasty of right SFA using 4 x 100 mm Lutonix balloonSURGEON: Kirt Mclaughlin,        
                                                                                            
                                   |
| MDASSISTANT: NoneANESTHESIA: Moderate sedation and local anesthesiaInformed consent was   
                                                                                            
                                   |
| obtained from the patient. Continuous cardiac monitoring was performed throughout the     
                                                                                            
                                   |
| procedure.Conscious sedation was provided by the nursing staff during the procedure       
                                                                                            
                                   |
| under my supervision.Sedation time: 34 minutesMedications: 2 mg Versed IV, 50 mcg         
                                                                                            
                                   |
| Fentanyl IVESTIMATED BLOOD LOSS: MinimalCONTRAST:  53 mL of Isovue 250INDICATIONS:  See   
                                                                                            
                                   |
| preoperative history and physicalFINDINGS:  Aorta and iliac arteries calcified but        
                                                                                            
                                   |
| without significant stenosis. Right SFA with high-grade stenosis proximally. After        
                                                                                            
                                   |
| angioplasty and stenting, good angiographic results. Good two-vessel runoff to right      
                                                                                            
                                   |
| foot via right anterior tibial artery and peroneal artery. Right posterior tibial artery  
                                                                                            
                                   |
|  was chronically occluded.DESCRIPTION OF PROCEDURE:  The patient was properly identified  
                                                                                            
                                   |
|  and brought to the Cath Lab. The patient was placed supine on the catheter table and     
                                                                                            
                                   |
| patient was given moderate sedation by nursing staff under my supervision. Patient's      
                                                                                            
                                   |
| bilateral groins were then prepped and draped in the usual sterile fashion. Local         
                                                                                            
                                   |
| anesthetic was given to the left groin and the left common femoral artery was accessed    
                                                                                            
                                   |
| under ultrasound guidance using a micropuncture needle. A micropuncture sheath was        
                                                                                            
                                   |
| inserted over a wire and up sized to a 4 French sheath. A Omni flush catheter was then    
                                                                                            
                                   |
| inserted into the distal aorta and aortogram was then performed with the findings as      
                                                                                            
                                   |
| described above. The Omni Flush catheter was then used to guide a Glidewire into the      
                                                                                            
                                   |
 
| right common femoral artery and then the catheter was then advanced over the wire. A      
                                                                                            
                                   |
| right lower extremity runoff was then performed with the findings as described            
                                                                                            
                                   |
| above.Next, an Amplatzer wire was then inserted over the catheter and the 4 French        
                                                                                            
                                   |
| sheath was removed. A 6 French destination sheath was then inserted over the wire with    
                                                                                            
                                   |
| the tip of the sheath being placed into the right common femoral artery. A vertebral      
                                                                                            
                                   |
| catheter was inserted over the wire and the wire was then removed. A Glidewire was then   
                                                                                            
                                   |
| placed into the vertebral catheter and used to cross through the stenotic right SFA.      
                                                                                            
                                   |
| Next, a 260 fix core wire was then inserted over the catheter.  Right SFA was stented     
                                                                                            
                                   |
| using 6 x 80 mm self-expanding stent. Drug eluting balloon angioplasty of the right SFA   
                                                                                            
                                   |
| was then performed using 4 x 100 mm Lutonix balloon.A final arteriogram was then          
                                                                                            
                                   |
| performed through the sheath. The destination sheath was then removed and a Mynx closure  
                                                                                            
                                   |
|  device was then used on the left common femoral artery and hemostasis was ensured. The   
                                                                                            
                                   |
| patient was then taken to the observation unit for further monitoring.IMPRESSION:1.       
                                                                                            
                                   |
| Aorta and iliac arteries were calcified but without significant stenosis.2. Right SFA     
                                                                                            
                                   |
| with high-grade stenosis proximally with good endograft results after angioplasty and     
                                                                                            
                                   |
| stenting.3. Two-vessel runoff via right anterior tibial artery and peroneal artery to     
                                                                                            
                                   |
| right foot.4. 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       
                                                                                            
                                   |
| signed by Kirt Mclaughlin MD on 2019 3:50 PM                                              
                                                                                            
                                   |
|                                                                                           
                                                                                            
                                   |
|A final arteriogram was then performed through the sheath. The destination sheath was then 
removed and a Mynx closure device was then used on the left common femoral artery and hemost
asis was ensured. The patient was  |
 
|then taken to the observation unit for further monitoring.                                 
                                                                                            
                                   |
|                                                                                           
                                                                                            
                                   |
|IMPRESSION:                                                                                
                                                                                            
                                  |
|                                                                                           
                                                                                            
                                   |
|1. Aorta and iliac arteries were calcified but without significant stenosis.               
                                                                                            
                                   |
|2. Right SFA with high-grade stenosis proximally with good endograft results after angiopla
sty and stenting.                                                                           
                                   |
|3. Two-vessel runoff via right anterior tibial artery and peroneal artery to right foot.   
                                                                                            
                                   |
|4. 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                                 
                                                                                            
                                   |
|                                                                                           
                                                                                            
                                   |
|Electronically signed by Kirt Mclaughlin MD on 2019 3:50 PM                                
                                                                                            
                                   |
+-------------------------------------------------------------------------------------------
--------------------------------------------------------------------------------------------
-----------------------------------+
 
 
 
+--------------------+------------------+--------------------+--------------+
| Performing         | Address          | City/State/Zipcode | Phone Number |
| Organization       |                  |                    |              |
+--------------------+------------------+--------------------+--------------+
|   [facility] |   [address] | [place] [address] |              |
+--------------------+------------------+--------------------+--------------+
 IR aortagram abdominal (2019  6:45 PM)
 
+------------------------------------------------------------------------+--------------+
| Impressions                                                            | Performed At |
+------------------------------------------------------------------------+--------------+
|      1. Aorta and iliac arteries were calcified but without            |   [facility]     |
| significant stenosis.  2. Right SFA with high-grade stenosis           | RADIOLOGY    |
| proximally with good endograft results after angioplasty and stenting. |              |
|   3. 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
| artery to right foot.  4. Right posterior tibial artery was            |              |
| chronically occluded.     Electronically signed by Kirt Mclaughlin MD on    |              |
| 2019 3:50 PM                                                      |              |
+------------------------------------------------------------------------+--------------+
 
 
 
+------------------------------------------------------------------------+--------------+
| Narrative                                                              | Performed At |
 
+------------------------------------------------------------------------+--------------+
|   LOWER EXTREMITY ARTERIOGRAM, UNILATERAL     PREOPERATIVE             |   [facility]     |
| DIAGNOSIS:    Critical limb ischemia of right lower extremity with     | RADIOLOGY    |
| poorly healing wound of right great toe     POSTOPERATIVE              |              |
| DIAGNOSIS:    Same     PROCEDURE:  1. Moderate conscious sedation  2.  |              |
| Ultrasound guided access of the left common femoral artery  3.         |              |
| Aortogram  4. Selective right common femoral artery catheterization    |              |
| with right leg runoff  5. Right SFA stenting using 6 x 80 mm           |              |
| self-expanding stent  6 Drug eluting balloon angioplasty of right SFA  |              |
| using 4 x 100 mm Lutonix balloon     SURGEON: Kirt Mclaughlin MD              |              |
| ASSISTANT: None     ANESTHESIA: Moderate sedation and local anesthesia |              |
|      Informed consent was obtained from the patient. Continuous        |              |
| cardiac monitoring was performed throughout the procedure.  Conscious  |              |
| sedation was provided by the nursing staff during the procedure under  |              |
| my supervision.  Sedation time: 34 minutes  Medications: 2 mg Versed   |              |
| IV, 50 mcg Fentanyl IV     ESTIMATED BLOOD LOSS: Minimal               |              |
| CONTRAST:    53 mL of Isovue 250     INDICATIONS:    See preoperative  |              |
| history and physical     FINDINGS:    Aorta and iliac arteries         |              |
| calcified but without significant stenosis. Right SFA with high-grade  |              |
| stenosis proximally. After angioplasty and stenting, good angiographic |              |
|  results. Good two-vessel runoff to right foot via right anterior      |              |
| tibial artery and peroneal artery. Right posterior tibial artery was   |              |
| chronically occluded.     DESCRIPTION OF PROCEDURE:    The patient was |              |
|  properly identified and brought to the Cath Lab. The patient was      |              |
| placed supine on the catheter table and patient was given moderate     |              |
| sedation by nursing staff under my supervision. Patient's bilateral    |              |
| groins were then prepped and draped in the usual sterile fashion.      |              |
| Local anesthetic was given to the left groin and the left common       |              |
| femoral artery was accessed under ultrasound guidance using a          |              |
| micropuncture needle. A micropuncture sheath was inserted over a wire  |              |
| and up sized to a 4 French sheath. A Omni flush catheter was then      |              |
| inserted into the distal aorta and aortogram was then performed with   |              |
| the findings as described above. The Omni Flush catheter was then used |              |
|  to guide a Glidewire into the right common femoral artery and then    |              |
| the catheter was then advanced over the wire. A right lower extremity  |              |
| runoff was then performed with the findings as described above.        |              |
| Next, an Amplatzer wire was then inserted over the catheter and the 4  |              |
| French sheath was removed. A 6 French destination sheath was then      |              |
| inserted over the wire with the tip of the sheath being placed into    |              |
| the right common femoral artery. A vertebral catheter was inserted     |              |
| over the wire and the wire was then removed. A Glidewire was then      |              |
| placed into the vertebral catheter and used to cross through the       |              |
| stenotic right SFA.    Next, a 260 fix core wire was then inserted     |              |
| over the catheter.    Right SFA was stented using 6 x 80 mm            |              |
| self-expanding stent. Drug eluting balloon angioplasty of the right    |              |
| SFA was then performed using 4 x 100 mm Lutonix balloon.     A final   |              |
| arteriogram was then performed through the sheath. The destination     |              |
| sheath was then removed and a Mynx closure device was then used on the |              |
|  left common femoral artery and hemostasis was ensured. The patient    |              |
| was then taken to the observation unit for further monitoring.         |              |
+------------------------------------------------------------------------+--------------+
 
 
 
+-------------------------------------------------------------------------------------------
--------------------------------------------------------------------------------------------
-----------------------------------+
| Procedure Note                                                                            
                                                                                            
                                   |
 
+-------------------------------------------------------------------------------------------
--------------------------------------------------------------------------------------------
-----------------------------------+
|   Denny, Rad Results In - 2019  8:40 AM PST  LOWER EXTREMITY ARTERIOGRAM,             
                                                                                            
                                   |
| UNILATERALPREOPERATIVE DIAGNOSIS:  Critical limb ischemia of right lower extremity with   
                                                                                            
                                   |
| poorly healing wound of right great toePOSTOPERATIVE DIAGNOSIS:  SamePROCEDURE:1.         
                                                                                            
                                   |
| Moderate conscious sedation2. Ultrasound guided access of the left common femoral         
                                                                                            
                                   |
| artery3. Aortogram4. Selective right common femoral artery catheterization with right     
                                                                                            
                                   |
| leg runoff5. Right SFA stenting using 6 x 80 mm self-expanding stent6 Drug eluting        
                                                                                            
                                   |
| balloon angioplasty of right SFA using 4 x 100 mm Lutonix balloonSURGEON: Kirt Mclaughlin,        
                                                                                            
                                   |
| MDASSISTANT: NoneANESTHESIA: Moderate sedation and local anesthesiaInformed consent was   
                                                                                            
                                   |
| obtained from the patient. Continuous cardiac monitoring was performed throughout the     
                                                                                            
                                   |
| procedure.Conscious sedation was provided by the nursing staff during the procedure       
                                                                                            
                                   |
| under my supervision.Sedation time: 34 minutesMedications: 2 mg Versed IV, 50 mcg         
                                                                                            
                                   |
| Fentanyl IVESTIMATED BLOOD LOSS: MinimalCONTRAST:  53 mL of Isovue 250INDICATIONS:  See   
                                                                                            
                                   |
| preoperative history and physicalFINDINGS:  Aorta and iliac arteries calcified but        
                                                                                            
                                   |
| without significant stenosis. Right SFA with high-grade stenosis proximally. After        
                                                                                            
                                   |
| angioplasty and stenting, good angiographic results. Good two-vessel runoff to right      
                                                                                            
                                   |
| foot via right anterior tibial artery and peroneal artery. Right posterior tibial artery  
                                                                                            
                                   |
|  was chronically occluded.DESCRIPTION OF PROCEDURE:  The patient was properly identified  
                                                                                            
                                   |
|  and brought to the Cath Lab. The patient was placed supine on the catheter table and     
                                                                                            
                                   |
| patient was given moderate sedation by nursing staff under my supervision. Patient's      
                                                                                            
                                   |
 
| bilateral groins were then prepped and draped in the usual sterile fashion. Local         
                                                                                            
                                   |
| anesthetic was given to the left groin and the left common femoral artery was accessed    
                                                                                            
                                   |
| under ultrasound guidance using a micropuncture needle. A micropuncture sheath was        
                                                                                            
                                   |
| inserted over a wire and up sized to a 4 French sheath. A Omni flush catheter was then    
                                                                                            
                                   |
| inserted into the distal aorta and aortogram was then performed with the findings as      
                                                                                            
                                   |
| described above. The Omni Flush catheter was then used to guide a Glidewire into the      
                                                                                            
                                   |
| right common femoral artery and then the catheter was then advanced over the wire. A      
                                                                                            
                                   |
| right lower extremity runoff was then performed with the findings as described            
                                                                                            
                                   |
| above.Next, an Amplatzer wire was then inserted over the catheter and the 4 French        
                                                                                            
                                   |
| sheath was removed. A 6 French destination sheath was then inserted over the wire with    
                                                                                            
                                   |
| the tip of the sheath being placed into the right common femoral artery. A vertebral      
                                                                                            
                                   |
| catheter was inserted over the wire and the wire was then removed. A Glidewire was then   
                                                                                            
                                   |
| placed into the vertebral catheter and used to cross through the stenotic right SFA.      
                                                                                            
                                   |
| Next, a 260 fix core wire was then inserted over the catheter.  Right SFA was stented     
                                                                                            
                                   |
| using 6 x 80 mm self-expanding stent. Drug eluting balloon angioplasty of the right SFA   
                                                                                            
                                   |
| was then performed using 4 x 100 mm Lutonix balloon.A final arteriogram was then          
                                                                                            
                                   |
| performed through the sheath. The destination sheath was then removed and a Mynx closure  
                                                                                            
                                   |
|  device was then used on the left common femoral artery and hemostasis was ensured. The   
                                                                                            
                                   |
| patient was then taken to the observation unit for further monitoring.IMPRESSION:1.       
                                                                                            
                                   |
| Aorta and iliac arteries were calcified but without significant stenosis.2. Right SFA     
                                                                                            
                                   |
 
| with high-grade stenosis proximally with good endograft results after angioplasty and     
                                                                                            
                                   |
| stenting.3. Two-vessel runoff via right anterior tibial artery and peroneal artery to     
                                                                                            
                                   |
| right foot.4. 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       
                                                                                            
                                   |
| signed by Kirt Mclaughlin MD on 2019 3:50 PM                                              
                                                                                            
                                   |
|                                                                                           
                                                                                            
                                   |
|A final arteriogram was then performed through the sheath. The destination sheath was then 
removed and a Mynx closure device was then used on the left common femoral artery and hemost
asis was ensured. The patient was  |
|then taken to the observation unit for further monitoring.                                 
                                                                                            
                                   |
|                                                                                           
                                                                                            
                                   |
|IMPRESSION:                                                                                
                                                                                            
                                  |
|                                                                                           
                                                                                            
                                   |
|1. Aorta and iliac arteries were calcified but without significant stenosis.               
                                                                                            
                                   |
|2. Right SFA with high-grade stenosis proximally with good endograft results after angiopla
sty and stenting.                                                                           
                                   |
|3. Two-vessel runoff via right anterior tibial artery and peroneal artery to right foot.   
                                                                                            
                                   |
|4. 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                                 
                                                                                            
                                   |
|                                                                                           
                                                                                            
                                   |
|Electronically signed by Kirt Mclaughlin MD on 2019 3:50 PM                                
                                                                                            
                                   |
+-------------------------------------------------------------------------------------------
--------------------------------------------------------------------------------------------
-----------------------------------+
 
 
 
+--------------------+------------------+--------------------+--------------+
| Performing         | Address          | City/State[facility] | Phone Number |
| Organization       |                  |                    |              |
+--------------------+------------------+--------------------+--------------+
|   [facility] |   [address] | [place] [address] |              |
+--------------------+------------------+--------------------+--------------+
 
 IR guidance vascular access US (2019  5:40 PM)
 
+------------------------------------------------------------------------+--------------+
| Narrative                                                              | Performed At |
+------------------------------------------------------------------------+--------------+
|   This Point of Care (POC) ultrasound image has been reviewed and      |   [facility]     |
| interpreted by the physician identified as the performing physician in | RADIOLOGY    |
|  the   associated interpretation and report.                           |              |
+------------------------------------------------------------------------+--------------+
 
 
 
+--------------------+------------------+--------------------+--------------+
| Performing         | Address          | City/State/Zipcode | Phone Number |
| Organization       |                  |                    |              |
+--------------------+------------------+--------------------+--------------+
|   [facility] |   [address] | [place] [address] |              |
+--------------------+------------------+--------------------+--------------+
 CBC w/manual diff (2019  3:43 PM)
 
+----------------------+-------------------------+-------------------+-----------------+
| Component            | Value                   | Ref Range         | Performed At    |
+----------------------+-------------------------+-------------------+-----------------+
| WBC                  | 5.53Comment:            | 3.80 - 11.00 K/uL | [facility] |
+----------------------+-------------------------+-------------------+-----------------+
| RBC                  | 2.76 (L)                | 3.70 - 5.10 M/uL  | [facility] |
+----------------------+-------------------------+-------------------+-----------------+
| HGB                  | 9.4 (L)                 | 11.3 - 15.5 g/dL  | [facility] |
+----------------------+-------------------------+-------------------+-----------------+
| HCT                  | 28.4 (L)                | 34.0 - 46.0 %     | [facility] |
+----------------------+-------------------------+-------------------+-----------------+
| MCV                  | 102.9 (H)               | 80.0 - 100.0 fl   | [facility] |
+----------------------+-------------------------+-------------------+-----------------+
| MCH                  | 34.1 (H)                | 27.0 - 34.0 pg    | [facility] |
+----------------------+-------------------------+-------------------+-----------------+
| MCHC                 | 33.2                    | 32.0 - 35.5 g/dL  | [facility] |
+----------------------+-------------------------+-------------------+-----------------+
| RDW SD               | 61.3 (H)                | 37 - 53 fl        | [facility] |
+----------------------+-------------------------+-------------------+-----------------+
| PLT                  | 147 (L)Comment:         | 150 - 400 K/uL    | [facility] |
+----------------------+-------------------------+-------------------+-----------------+
| MPV                  | 9.3Comment:             | fl                | [facility] |
+----------------------+-------------------------+-------------------+-----------------+
| DIFF TYPE            | MANUAL                  |                   | [facility] |
+----------------------+-------------------------+-------------------+-----------------+
| Neutrophils Manual   | 64                      | %                 | [facility] |
+----------------------+-------------------------+-------------------+-----------------+
| Lymphocytes Manual   | 28                      | %                 | [facility] |
+----------------------+-------------------------+-------------------+-----------------+
| Monocytes Manual     | 8                       | %                 | [facility] |
+----------------------+-------------------------+-------------------+-----------------+
| Neutrophils Absolute | 3.54                    | 1.90 - 7.40 K/uL  | [facility] |
+----------------------+-------------------------+-------------------+-----------------+
| Lymphocytes Absolute | 1.55                    | 1.00 - 3.90 K/uL  | [facility] |
+----------------------+-------------------------+-------------------+-----------------+
| Monocytes Absolute   | 0.44                    | 0.00 - 0.80 K/uL  | [facility] |
+----------------------+-------------------------+-------------------+-----------------+
| MORPHOLOGY           | 1+Comment: ANISONORMAL  |                   | [facility] |
|                      | PLT MORPHTesting        |                   |                 |
|                      | performed at [facility];[address]    |                   |                 |
 
|                      | [address];[place], [place]  |                   |                 |
|                      | [id number]                   |                   |                 |
|                      |                         |                   |                 |
+----------------------+-------------------------+-------------------+-----------------+
 
 
 
+-------------------+
| Specimen          |
+-------------------+
| Blood - Arm, Left |
+-------------------+
 
 
 
+-------------------+------------------+--------------------+--------------+
| Performing        | Address          | City/State/Zipcode | Phone Number |
| Organization      |                  |                    |              |
+-------------------+------------------+--------------------+--------------+
|   [facility] |   [address] | [facility] [place] [address] |              |
+-------------------+------------------+--------------------+--------------+
 CPK (2019  3:43 PM)
 
+-----------+-------------------------+--------------+-----------------+
| Component | Value                   | Ref Range    | Performed At    |
+-----------+-------------------------+--------------+-----------------+
| CPK       | 28 (L)Comment: Testing  | 30 - 240 U/L | [facility] |
|           | performed at [facility];[address]    |              |                 |
|           | [address];[place]  |              |                 |
|           | [id number]                   |              |                 |
+-----------+-------------------------+--------------+-----------------+
 
 
 
+-------------------+
| Specimen          |
+-------------------+
| Blood - Arm, Left |
+-------------------+
 
 
 
+-------------------+------------------+--------------------+--------------+
| Performing        | Address          | City/State/Zipcode | Phone Number |
| Organization      |                  |                    |              |
+-------------------+------------------+--------------------+--------------+
|   [facility] |   [address] | [place] [address] |              |
+-------------------+------------------+--------------------+--------------+
 US lower extremty  arterial right (2019  2:23 PM)
 
+------------------------------------------------------------------------+--------------+
| Impressions                                                            | Performed At |
+------------------------------------------------------------------------+--------------+
|   1. Right leg shows biphasic inflow with a greater than 50% stenosis  |   [facility]     |
| at  the origin of the superficial femoral artery                       | RADIOLOGY    |
| (137-309).    Biphasic  outflow.  2. Two vessel runoff to the right    |              |
| foot.  Signed by: Eugenio Hoffman Date/Time: 2019 3:11 PM  |              |
+------------------------------------------------------------------------+--------------+
 
 
 
 
+------------------------------------------------------------------------+--------------+
| Narrative                                                              | Performed At |
+------------------------------------------------------------------------+--------------+
|   LOWER EXTREMITY ARTERIAL EXAM WITH IMAGING  CLINICAL INFORMATION:    |   [facility]     |
| The patient is a 68-year-old female presenting to the vascular lab     | RADIOLOGY    |
| with  complaints of right foot nonhealing wound.    We have been asked |              |
|  to  evaluate for peripheral arterial disease.  COMPARISON:  None      |              |
| PROCEDURE:  Imaging of the right lower extremity arteries was          |              |
| performed using both  color Doppler and spectral Doppler techniques    |              |
| with a 9 megahertz linear  array transducer.  FINDINGS:  RIGHT  CFA    |              |
| prox 137 biphasic  DFA prox 71 biphasic  SFA prox 309 biphasic  SFA    |              |
| mid 91 biphasic  SFA distal 127 biphasic  POP mid 96 biphasic  GIL     |              |
| prox 69 biphasic  GIL distal 145 biphasic  PTA prox 50 biphasic  PTA   |              |
| distal 58 biphasic  WINIFRED prox 74 biphasic  WINIFRED distal 0    absent     |              |
+------------------------------------------------------------------------+--------------+
 
 
 
+------------------------------------------------------------------------------------------+
| Procedure Note                                                                           |
+------------------------------------------------------------------------------------------+
|   Lauro Baldwin Results In - 2019  3:14 PM PST  LOWER EXTREMITY ARTERIAL EXAM WITH      |
| IMAGINGCLINICAL INFORMATION:The patient is a 68-year-old female presenting to the        |
| vascular lab withcomplaints of right foot nonhealing wound.  We have been asked          |
| toevaluate for peripheral arterial disease.COMPARISON:NonePROCEDURE:Imaging of the right |
|  lower extremity arteries was performed using bothcolor Doppler and spectral Doppler     |
| techniques with a 9 megahertz lineararray transducer.FINDINGS:RIGHTCFA prox 137          |
| biphasicDFA prox 71 biphasicSFA prox 309 biphasicSFA mid 91 biphasicSFA distal 127       |
| biphasicPOP mid 96 biphasicATA prox 69 biphasicATA distal 145 biphasicPTA prox 50        |
| biphasicPTA distal 58 biphasicPERA prox 74 biphasicPERA distal 0  absentIMPRESSION:1.    |
| Right leg shows biphasic inflow with a greater than 50% stenosis atthe origin of the     |
| superficial femoral artery (137-309).  Biphasicoutflow.2. Two vessel runoff to the right |
|  foot.Signed by: Jamilah Hoffman Date/Time: 2019 3:11 PM                       |
|RIGHT                                                                                    |
|CFA prox 137 biphasic                                                                     |
|DFA prox 71 biphasic                                                                      |
|SFA prox 309 biphasic                                                                     |
|SFA mid 91 biphasic                                                                       |
|SFA distal 127 biphasic                                                                   |
|POP mid 96 biphasic                                                                       |
|GIL prox 69 biphasic                                                                      |
|GIL distal 145 biphasic                                                                   |
|PTA prox 50 biphasic                                                                      |
|PTA distal 58 biphasic                                                                    |
|WINIFRED prox 74 biphasic                                                                     |
|WINIFRED distal 0  absent                                                                     |
|IMPRESSION:                                                                              |
|1. Right leg shows biphasic inflow with a greater than 50% stenosis at                    |
|the origin of the superficial femoral artery (137-309).  Biphasic                         |
|outflow.                                                                                 |
|2. Two vessel runoff to the right foot.                                                   |
|Signed by: Eugenio Hoffman                                                                |
|Sign Date/Time: 2019 3:11 PM                                                        |
+------------------------------------------------------------------------------------------+
 
 
 
+--------------------+------------------+--------------------+--------------+
| Performing         | Address          | City/State/Zipcode | Phone Number |
 
| Organization       |                  |                    |              |
+--------------------+------------------+--------------------+--------------+
|   [facility] |   [address] | [place] [address] |              |
+--------------------+------------------+--------------------+--------------+
 from Last 3 Months
 
 Insurance
 
 
+----------+--------+-------------+------+-------+----------------------+
| Payer    | Benefi | Subscriber  | Type | Phone | Address              |
|          | t Plan | ID          |      |       |                      |
|          |  /     |             |      |       |                      |
|          | Group  |             |      |       |                      |
+----------+--------+-------------+------+-------+----------------------+
| MEDICARE | MEDICA | [id number] |      |       |   [address]        |
|          | RE     |             |      |       | [place] [id number] |
|          | IP-OP  |             |      |       |                      |
+----------+--------+-------------+------+-------+----------------------+
| MEDICAID | MEDICA | [id number]    |      |       |   [address]        |
|          | ID     |             |      |       | [facility]          |
|          | [place] |             |      |       | [id number]           |
+----------+--------+-------------+------+-------+----------------------+
 
 
 
+----------------------+--------+-------------+--------+-------------+-----------------+
| Guarantor Name       | Accoun | Relation to | Date   | Phone       | Billing Address |
|                      | t Type |  Patient    | of     |             |                 |
|                      |        |             | Birth  |             |                 |
+----------------------+--------+-------------+--------+-------------+-----------------+
| CHERIE JO | Person | Self        | / |   Home:     |   [address]    |
|                      | al/Fam |             | 1950   | [phone] | [place]    |
|                      | [place]    |             |        | [address]        | [address]      |
+----------------------+--------+-------------+--------+-------------+-----------------+

## 2019-04-19 NOTE — XMS
Encounter Summary
  Created on: 2019
 
 Cherie Villalobos
 External Reference #: [id number]
 : 48
 Sex: Female
 
 Demographics
 
 
+-----------------------+--------------------------+
| Address               | [address]               |
|                       | [place]  [address] |
+-----------------------+--------------------------+
| Home Phone            | [phone]          |
+-----------------------+--------------------------+
| Preferred Language    | Unknown                  |
+-----------------------+--------------------------+
| Marital Status        |                   |
+-----------------------+--------------------------+
| Jewish Affiliation | 1013                     |
+-----------------------+--------------------------+
| Race                  | Unknown                  |
+-----------------------+--------------------------+
| Ethnic Group          | Unknown                  |
+-----------------------+--------------------------+
 
 
 Author
 
 
+--------------+-----------------------+
| Author       | [email or web address] |
+--------------+-----------------------+
| Organization | [facility] |
+--------------+-----------------------+
| Address      | Unknown               |
+--------------+-----------------------+
| Phone        | Unavailable           |
+--------------+-----------------------+
 
 
 
 Support
 
 
+---------------+--------------+---------------------+-----------------+
| Name          | Relationship | Address             | Phone           |
+---------------+--------------+---------------------+-----------------+
| Anoop Villalobos | ECON         | [place], | [phone] |
|               |              |  [place]  [address]     |                 |
+---------------+--------------+---------------------+-----------------+
| Jennifer Dickson | ECON         | [place]       | [phone] |
|               |              | [phone]               |                 |
+---------------+--------------+---------------------+-----------------+
 
 
 
 
 Care Team Providers
 
 
+-----------------------+------+-----------------+
| Care Team Member Name | Role | Phone           |
+-----------------------+------+-----------------+
| Ivy Couch DO      | PCP  | [phone] |
+-----------------------+------+-----------------+
 
 
 
 Reason for Visit
 
 
+--------+-----------------------------------------------------+
| Reason | Comments                                            |
+--------+-----------------------------------------------------+
| [facility]  | [facility] |
+--------+-----------------------------------------------------+
 
 
 
 Encounter Details
 
 
+--------+-------------+----------------------+----------------------+----------------------
+
| Date   | Type        | Department           | Care Team            | Description          
|
+--------+-------------+----------------------+----------------------+----------------------
+
| 02/15/ | Documentati |   [facility]      |   João Asher MD  | Other (January       
|
| 2019   | on Only     | [address]        |  [address]   | 2019-[facility] Provider 
|
|        |             | [address]   | [address]  [place], [place]    |  D[facility] Round[address]   
|
|        |             | [place] [address]   | [address]  [phone]  | Note)                
|
|        |             | [phone]         |  [phone] (Fax)  |                      
|
+--------+-------------+----------------------+----------------------+----------------------
+
 
 
 
 Social History
 
 
+---------------+------------+-----------+--------+------------------+
| Tobacco Use   | Types      | Packs/Day | Years  | Date             |
|               |            |           | Used   |                  |
+---------------+------------+-----------+--------+------------------+
| Former Smoker | Cigarettes | 1         | 30     | Quit: 2004 |
+---------------+------------+-----------+--------+------------------+
 
 
 
+---------------------+---+---+---+
 
| Smokeless Tobacco:  |   |   |   |
| Never Used          |   |   |   |
+---------------------+---+---+---+
 
 
 
+------------------------------+
| Comments: quite smoking  |
+------------------------------+
 
 
 
+-------------+-----------+---------+----------+
| Alcohol Use | Drinks/We | oz/Week | Comments |
|             | ek        |         |          |
+-------------+-----------+---------+----------+
| No          |           |         |          |
+-------------+-----------+---------+----------+
 
 
 
+------------------+---------------+
| Sex Assigned at  | Date Recorded |
| Birth            |               |
+------------------+---------------+
| Not on file      |               |
+------------------+---------------+
 as of this encounter
 
 Plan of Treatment
 
 
+--------+----------+-----------------+----------------------+-------------+
| Date   | Type     | Specialty       | Care Team            | Description |
+--------+----------+-----------------+----------------------+-------------+
| / | Initial  | General Surgery |   Сергей Medeiros, |             |
|    | consult  |                 |  MD [address]  |             |
|        |          |                 |  [facility] [place] [address]  |             |
|        |          |                 |  [phone]        |             |
|        |          |                 | [phone] (Fax)   |             |
+--------+----------+-----------------+----------------------+-------------+
 as of this encounter
 
 Visit Diagnoses
 Not on filein this encounter

## 2019-04-19 NOTE — XMS
Encounter Summary
  Created on: 2019
 
 Cherie Villalobos
 External Reference #: [id number]
 : 48
 Sex: Female
 
 Demographics
 
 
+-----------------------+--------------------------+
| Address               | [address]               |
|                       | [place]  [address] |
+-----------------------+--------------------------+
| Home Phone            | [phone]          |
+-----------------------+--------------------------+
| Preferred Language    | Unknown                  |
+-----------------------+--------------------------+
| Marital Status        |                   |
+-----------------------+--------------------------+
| Mu-ism Affiliation | [id number]                     |
+-----------------------+--------------------------+
| Race                  | Unknown                  |
+-----------------------+--------------------------+
| Ethnic Group          | Unknown                  |
+-----------------------+--------------------------+
 
 
 Author
 
 
+--------------+-----------------------+
| Author       | Sherry Primo Round |
+--------------+-----------------------+
| Organization | [facility] |
+--------------+-----------------------+
| Address      | Unknown               |
+--------------+-----------------------+
| Phone        | Unavailable           |
+--------------+-----------------------+
 
 
 
 Support
 
 
+---------------+--------------+---------------------+-----------------+
| Name          | Relationship | Address             | Phone           |
+---------------+--------------+---------------------+-----------------+
| Anoop Villalobos | ECON         | [place], | [phone] |
|               |              |  [place]  [address]     |                 |
+---------------+--------------+---------------------+-----------------+
| Jennifer Dickson | ECON         | [place]       | [phone] |
|               |              | [id number]               |                 |
+---------------+--------------+---------------------+-----------------+
 
 
 
 
 Care Team Providers
 
 
+-----------------------+------+-----------------+
| Care Team Member Name | Role | Phone           |
+-----------------------+------+-----------------+
| Ivy Montague DO      | PCP  | [phone] |
+-----------------------+------+-----------------+
 
 
 
 Reason for Referral
 Home Health Care (Routine)
 
+-------------+--------------+--------------+--------------+--------------+--------------+
| Status      | Reason       | Specialty    | Diagnoses /  | Referred By  | Referred To  |
|             |              |              | Procedures   | Contact      | Contact      |
+-------------+--------------+--------------+--------------+--------------+--------------+
| New Request |   Specialty  | Home Health  |   Diagnoses  |              |              |
|             | Services     | Services     |  Fall in     | Sanna,  |              |
|             | Required     |              | home,        | MD Angelique    |              |
|             |              |              | initial      | [address]    |              |
|             |              |              | encounter    | [address]VD         |              |
|             |              |              |              | [place], [place] |              |
|             |              |              |              |  [address]       |              |
|             |              |              |              | Phone:       |              |
|             |              |              |              | [phone] |              |
|             |              |              |              |   Fax:       |              |
|             |              |              |              | [phone] |              |
+-------------+--------------+--------------+--------------+--------------+--------------+
 
 
 
 
 Reason for Visit
 
 
+----------------+-------------+
| Reason         | Comments    |
+----------------+-------------+
| Referral       | Dr. Elliott    |
+----------------+-------------+
| Skin Complaint | right foot  |
+----------------+-------------+
 Auth/Cert
 
+--------+--------+-----------+--------------+--------------+---------------+
| Status | Reason | Specialty | Diagnoses /  | Referred By  | Referred To   |
|        |        |           | Procedures   | Contact      | Contact       |
+--------+--------+-----------+--------------+--------------+---------------+
|        |        |           |   Diagnoses  |              |   Kr 3rd    |
|        |        |           |  Generalized |              | [address] [place] |
|        |        |           |  weakness    |              |  Pavilion     |
|        |        |           | Closed       |              | [address]     |
|        |        |           | displaced    |              | [address]vd          |
|        |        |           | fracture of  |              | [place], [place]  |
|        |        |           | distal       |              | [address]  Phone: |
|        |        |           | phalanx of   |              |  [phone] |
|        |        |           | right great  |              |   Fax:        |
 
|        |        |           | toe, initial |              | [phone]  |
|        |        |           |  encounter   |              |               |
|        |        |           | Fall in      |              |               |
|        |        |           | home,        |              |               |
|        |        |           | initial      |              |               |
|        |        |           | encounter    |              |               |
|        |        |           | Cellulitis   |              |               |
|        |        |           | of right toe |              |               |
|        |        |           |   Fatigue,   |              |               |
|        |        |           | unspecified  |              |               |
|        |        |           | type         |              |               |
|        |        |           |              |              |               |
+--------+--------+-----------+--------------+--------------+---------------+
 
 
 
 
 Encounter Details
 
 
+--------+-----------+----------------------+----------------------+----------------------+
| Date   | Type      | Department           | Care Team            | Description          |
+--------+-----------+----------------------+----------------------+----------------------+
| / | Emergency |   [facility]    |   Cookson, Rachel M, | Fall in home,        |
| 2019 - |           | Medical [facility]ntr 3[facility]     |  DO  [address]  | initial encounter    |
|        |           | [address]        |  [place] [address]  | (Primary Dx);        |
| 02/15/ |           | Pavilion  [address]  |  [phone]        | Cellulitis of right  |
| 2019   |           | Blvd  [place], [place]   | Ginny Raya MD  | toe; Closed          |
|        |           | [address]  [phone]  |  [address]      | displaced fracture   |
|        |           |                      | [place] [address]   | of distal phalanx of |
|        |           |                      | [phone]         |  right great toe,    |
|        |           |                      | [phone] (Fax)   | initial encounter;   |
|        |           |                      | Angelique Isidro,   | Generalized          |
|        |           |                      | MD  [address]   | weakness; Fatigue,   |
|        |           |                      | [place] [address]   | unspecified type     |
|        |           |                      | [phone]         |                      |
|        |           |                      | [phone] (Fax)   |                      |
+--------+-----------+----------------------+----------------------+----------------------+
 
 
 
 Social History
 
 
+---------------+------------+-----------+--------+------------------+
| Tobacco Use   | Types      | Packs/Day | Years  | Date             |
|               |            |           | Used   |                  |
+---------------+------------+-----------+--------+------------------+
| Former Smoker | Cigarettes | 1         | 30     | Quit: 2004 |
+---------------+------------+-----------+--------+------------------+
 
 
 
+---------------------+---+---+---+
| Smokeless Tobacco:  |   |   |   |
| Never Used          |   |   |   |
+---------------------+---+---+---+
 
 
 
 
+------------------------------+
| Comments: quite smoking  |
+------------------------------+
 
 
 
+-------------+-----------+---------+----------+
| Alcohol Use | Drinks/We | oz/Week | Comments |
|             | ek        |         |          |
+-------------+-----------+---------+----------+
| No          |           |         |          |
+-------------+-----------+---------+----------+
 
 
 
+------------------+---------------+
| Sex Assigned at  | Date Recorded |
| Birth            |               |
+------------------+---------------+
| Not on file      |               |
+------------------+---------------+
 as of this encounter
 
 Last Filed Vital Signs
 
 
+-------------------+-------------------+-------------------------+
| Vital Sign        | Reading           | Time Taken              |
+-------------------+-------------------+-------------------------+
| Blood Pressure    | 112/57            | 02/15/2019 11:30 AM PST |
+-------------------+-------------------+-------------------------+
| Pulse             | 66                | 02/15/2019 11:30 AM PST |
+-------------------+-------------------+-------------------------+
| Temperature       | 36.7   C (98   F) | 02/15/2019 11:30 AM PST |
+-------------------+-------------------+-------------------------+
| Respiratory Rate  | 18                | 02/15/2019 11:30 AM PST |
+-------------------+-------------------+-------------------------+
| Oxygen Saturation | 96%               | 02/15/2019 11:30 AM PST |
+-------------------+-------------------+-------------------------+
| Inhaled Oxygen    | -                 | -                       |
| Concentration     |                   |                         |
+-------------------+-------------------+-------------------------+
| Weight            | 65.3 kg (144 lb)  | 2019 10:29 PM PST |
+-------------------+-------------------+-------------------------+
| Height            | 177.8 cm (5' 10") | 2019 10:29 PM PST |
+-------------------+-------------------+-------------------------+
| Body Mass Index   | 20.66             | 2019 10:29 PM PST |
+-------------------+-------------------+-------------------------+
 in this encounter
 
 Discharge Summaries
 Angelique Isidro MD - 02/15/2019  2:09 PM PSTFormatting of this note may be different fro
m the original.
 
 Patient:  Cherie Villalobos         :  1950     MRN:  [id number]
 Date of Admission:  2019  
 Date of Discharge:  2/15/2019   
 Treatment Team: 
 Consulting Physician: Srinivasan bAdi DPM
 Consulting Physician: Andrés Elliott MD
 
 Consulting Physician: João Asher MD
 Admitting Provider: Ginny Raya MD
 Discharging Provider:  ANGELIQUE ISIDRO MD
 Discharge Diagnoses: 
 Principal Problem:
   Falls frequently
 Active Problems:
   Depression
   ESRD (end stage renal disease) ([facility])
   Type 2 diabetes mellitus with chronic kidney disease on chronic dialysis, with long-term 
current use of insulin ([facility])
   Anemia in ESRD (end-stage renal disease) ([facility])
   Hypertension, essential
   Dyslipidemia
   Gastroesophageal reflux disease without esophagitis
   Loss of weight
   Severe protein-calorie malnutrition ([facility])
   Chronic systolic congestive heart failure ([facility])
   COPD (chronic obstructive pulmonary disease) ([facility])
   ICD (implantable cardioverter-defibrillator) in place
   Paroxysmal atrial fibrillation ([facility])
   S/P CABG x 2
   S/P mitral valve repair
   PVD (peripheral vascular disease) ([facility])
   CAD (coronary artery disease)
   Macrocytosis
   Lactic acidosis
   Restless legs syndrome
   Overactive bladder
 Resolved Problems:
   * No resolved hospital problems. *
     
 Procedures Performed:
 
 Chief Complaint:
 Referral (Dr. Elliott) and Skin Complaint (right foot )
 
 Hospital Course: 
 
 Frequent falls: 
 Assessment:  It was unclear initially  The exact culprit and it was felt potentially she wa
s a bit dry (as per nephrology who saw her this admission) as she had just had ultrafiltrati
on with HD, versus other culprit.  However the patient did state that when she has recently 
fallen it was in the setting of not using her walker as she had been instructed to as she wa
s only going a very short distance and felt she did not need it.  She will be very diligent 
about using her walker and taking all needed precautions.  
 
 Coronary artery disease with history of CABG, peripheral vascular disease, hypertension and
 hyperlipidemia with history of paroxysmal atrial fibrillation on anticoagulation:.  Will re
sume PTA meds as below on d/c 
 
  
 Overactive bladder: Continue oxybutynin. 
  
 Requip for restless leg syndrome.
  
 Anxiety depression: recommended to Refrain from using benzodiazepinesas much as possible an
d per d/w nephrology will decrease the ativan dose. 
 
  
 
 Diabetes mellitus with diabetic nephropathy with end-stage renal disease on hemodialysis: 
 Continue with current regimen for now, follow, and adjust as needed based on progress. 
 F/u with outpatient providers 
 
  
 
 With respect to her left foot prior injury and prior antibiotics: 
 Assessment:  She was seen by ID here and recently had completed a course of antbx reportedl
y.  They would like to have her off antbx for now and f/u withthem.  Should f/u with her pod
iatrist dr abdi for her foot as well. 
 
 Discharge Exam and Data:
 Vital Signs:
 /57 (BP Location: Right upper arm)  | Pulse 66  | Temp 98 F (36.7 C) (Oral)  | Re
sp 18  | Ht 1.778 m (5' 10")  | Wt 65.3 kg (144 lb)  | SpO2 96%  | Breastfeeding? No  | BMI 
20.66 kg/m 
 I&O Last 3 Shifts:  No intake/output data recorded.
 
 Physical Examination: 
 
 Constitutional: Alert and oriented to person, place, and time. Appears well-developed and w
ell-nourished. 
 
 HEENT: Neck supple, no JVD, non icteric sclera.
 
 Cardiovascular: Normal rate, regular rhythm, normal heart sounds, and intact distal pulses.
  Exam reveals no appreciated gallop or friction rub.  No murmur heard.
 
 Pulmonary/Chest: Effort normal and breath sounds normal. No stridor. No respiratory distres
s.  no wheezes. no rales. exhibits no tenderness. 
 
 Abdominal: Soft. Bowel sounds are normal. exhibits no distension. There is no tenderness. T
here is no rebound and no guarding. 
 
 Extremeties/Musculoskeletal: Normal general range of motion.exhibits no tenderness.  exhibi
ts no edema. Left foot in boot wrapped in dressing, see wound care and ID george . 
   
 Neurological:  Alert and oriented to person, place, and time. Has normal reflexes.  No morteza
s cranial nerve or focal deficit.  Exhibits normal muscle tone. Coordination normal.
 
 Skin: Skin is warm and dry. No rash noted. No erythema. No pallor. 
 
 Psychiatric: Has a normal mood and affect given situation. Behavior is normal. Judgment nor
mal for patient. 
 
 Recent Labs 
 Recent Labs
 Lab 19 
 WBC 5.14 
 HGB 10.1* 
 HCT 30.9* 
 * 
 
 Recent Labs
 Lab 19 
  
 K 4.7 
 CL 96* 
 
 CO2 >40* 
 BUN 9 
 CREATININE 2.96* 
 No results for input(s): INR in the last 168 hours.
 
 Results  
  Procedure Component Value Units Date/Time 
  Blood Culture Set 2 [[id number]] Collected:  19 
  Specimen:  Blood from Blood, peripheral draw Updated:  19 
  Blood Culture Set 1 [[id number]] Collected:  19 165 
  Specimen:  Blood from Blood Line Draw Updated:  19 
  
 
 Recent Radiology Results
 Xr Toe Right 2 View
 
 Result Date: 2019
 No radiographic evidence of osteomyelitis seen.  If further assessment is warranted, consid
er correlation with MRI. Potential acute fracture through the base of the distal phalanx. Si
gned by: Gavin South Sign Date/Time: 2019 5:15 PM
 
 Outstanding Issues:  
 F/u with podiatry, ID, PCP and resume HD.  
 
 Discharge Information:
 Follow up:
 Ivy Montague DO
 [address], [address]
 [facility] [id number] [phone]
 
 Schedule an appointment as soon as possible for a visit
 
 Srinivasan Abdi DPM
 [address] [facility] [facility] [address]
 [facility] [id number] [phone]
 
 Schedule an appointment as soon as possible for a visit
 please continue prior to admission plan
 
 Andrés Elliott MD
 [address] [facility] [address] [phone]
 
 Call
 please call to see when they would like to see you in follow up 
 
 resume care with all providers you were seeing prior to admission 
 
  
 Medication List 
  
 CHANGE how you take these medications  
 LORazepam 1 MG tablet
 Refills:  0
 Commonly known as:  ATIVAN
 Take 0.5 tablets by mouth every 8 (eight) hours as needed for Anxiety. Patient states she h
as the 1mg tablets at home and that they are scored and she will split them in half
 
 What changed:
  how much to take
  additional instructions
  
  
 CONTINUE taking these medications  
 albuterol 108 (90 Base) MCG/ACT inhaler
 Refills:  0
 Commonly known as:  PROVENTIL HFA;VENTOLIN HFA
  
 apixaban 2.5 MG tablet
 QTY:  60 tablet
 Refills:  0
 Commonly known as:  ELIQUIS
 Take 1 tablet by mouth 2 (two) times daily.
  
 atorvastatin 80 MG tablet
 Refills:  0
 Commonly known as:  LIPITOR
  
 carvedilol 12.5 MG tablet
 QTY:  60 tablet
 Refills:  0
 Doctor's comments:  Hold for SBP less than 100
 Hold for HR less than 60
 Commonly known as:  COREG
 Take 1 tablet by mouth 2 (two) times daily with meals.
  
 esomeprazole 40 MG capsule
 Refills:  0
 Commonly known as:  NEXIUM
  
 HYDROcodone-acetaminophen 5-325 MG per tablet
 QTY:  30 tablet
 Refills:  0
 Commonly known as:  NORCO
 Take 1 tablet by mouth every 4 (four) hours as needed.
  
 insulin aspart 100 UNIT/ML injection
 Refills:  0
 Commonly known as:  NOVOLOG
  
 insulin degludec 100 UNIT/ML injection
 Refills:  0
 Commonly known as:  TRESIBA
  
 isosorbide mononitrate 60 MG 24 hr tablet
 QTY:  30 tablet
 Refills:  1
 Take 1 tablet by mouth daily.
  
 losartan 100 MG tablet
 Refills:  0
 Commonly known as:  COZAAR
  
 nitroGLYCERIN 0.4 MG SL tablet
 QTY:  30 tablet
 Refills:  0
 Doctor's comments:  Hold for SBP less than 100
 Commonly known as:  NITROSTAT
 
 Place 1 tablet under the tongue every 5 (five) minutes as needed for Chest pain.
  
 nortriptyline 25 MG capsule
 Refills:  0
 Commonly known as:  PAMELOR
  
 ondansetron 4 MG disintegrating tablet
 Refills:  0
 Commonly known as:  ZOFRAN-ODT
  
 oxybutynin 5 MG tablet
 Refills:  0
 Commonly known as:  DITROPAN
  
 prochlorperazine 5 MG tablet
 QTY:  60 tablet
 Refills:  11
 Doctor's comments:  Please consider 90 day supplies to promote better adherence
 TAKE ONE TABLET BY MOUTH TWICE DAILY AS NEEDED FOR NAUSEA
  
 rOPINIRole 0.25 MG tablet
 Refills:  0
 Commonly known as:  REQUIP
  
 TRINTELLIX 20 MG Tabs
 Refills:  0
 Generic drug:  Vortioxetine HBr
  
  
 You might also be taking other medications not listed above. If you have questions about an
y of your other medications, talk to the person who prescribed them or your Primary Care Pro
vider. 
  
  
  
  
 Where to Get Your Medications 
  
 Information about where to get these medications is not yet available  
 Ask your nurse or doctor about these medications
  LORazepam 1 MG tablet
  
 
 Disposition:  Home
 Condition:  Stable
 Code Status:  Full Code
 
 Discharge took 35 minutes, to include final examination, discussion of admission, and prepa
ration of prescriptions, instructions for on-going care, follow-up and documentation of disc
harge summary.
 
 ANGELIQUE ISIDRO MD
 2:09 PMin this encounter
 
 Discharge Instructions
 Chandler Jean-Baptiste, [place] - 02/15/2019
 HOME HEALTH
 I certify that Cherie Villalobos is under my care and that I had a face to face encounter on  that meets the physician face to face encounter requirements. I certify that based on m
y findings , the patient is in need of intermittent skilled services and a plan for furnishi
 
ng these services to include, but not limited to the services listed in the questions below:
 
 Primary diagnosis for home health: Frequent Falls.
 Additional diagnosis: left great toe gangrene.
 Services needed: Physical & Occupational therapy.
 Patient is homebound because she cannot leave home without assistance of another individual
 and leaving the home requires a considerable and taxing effort. Patient needs the assistanc
e of another individual to leave home because: she has difficulty with ambulation and transf
ers. 
 Physician assuming care for Home Health services: IVY MONTAGUE
 
 FallPrevention
 Falls often occur due to slipping, tripping or losing your balance. Millions of people fall
 every year and injure themselves.Here are ways to reduce your risk of falling again.
  Think about your fall, was there anything that caused your fall that can be fixed, remov
ed, or replaced?
  Make your home safe by keeping walkways clear of objects you may trip over.
  Use non-slip pads under rugs. Do not use area rugs or small throw rugs.
  Use non-slip mats in bathtubs and showers.
  Install handrails and lights on staircases.
  Do not walk in poorly lit areas.
  Do not stand on chairs or wobbly ladders.
  Use caution when reaching overhead or looking upward.This position can cause a loss of
 balance.
  Be sure your shoes fit properly, have non-slip bottoms and are in good condition.
  Wear shoes both inside and out. Avoid going barefoot or wearing slippers.
  Be cautious when going up and down stairs, curbs, and when walking on uneven sidewalks.
  If your balance is poor, consider using a cane or walker.
  If your fall was related to alcohol use, stop or limit alcohol intake.
  If your fall was related to use of sleeping medicines, talk to your doctor about this.
You may need to reduce your dosage at bedtime if you awaken during the night to go to the [facility].
  To reduce the need for nighttime bathroom trips:
  Avoid drinking fluids for several hours before going to bed
  Empty your bladder before going to bed
  Men can keep a urinal at the bedside
  Stay as active as you can. Balance, flexibility, strength, and endurance all come from e
xercise. They all play a role in preventing falls. Ask your healthcare provider which types 
of activity are right for you.
  Get your vision checked on a regular basis.
  If you have pets, know where they are before you stand up or walk so you don't trip over
 them.
  Use night lights.
 Date Last Reviewed: 2015-2018 [employer]. [address] [place] [address]. All righ
ts reserved. This information is not intended as a substitute for professional medical care.
 Always follow your healthcare professional's instructions.
 
 in this encounter
 
 Medications at Time of Discharge
 
 
+----------------------+----------------------+--------+---------+----------+-----------+
| Medication           | Sig.                 | Disp.  | Refills | Start    | End Date  |
|                      |                      |        |         | Date     |           |
+----------------------+----------------------+--------+---------+----------+-----------+
|   albuterol          | Inhale 2 puffs into  |        |         |          |           |
| (PROVENTIL           | the lungs every 4    |        |         |          |           |
| HFA;VENTOLIN HFA)    | (four) hours as      |        |         |          |           |
 
| 108 (90 Base)        | needed for Wheezing. |        |         |          |           |
| MCG/ACT              |                      |        |         |          |           |
| inhalerIndications:  |                      |        |         |          |           |
| states uses 2x       |                      |        |         |          |           |
| monthly average      |                      |        |         |          |           |
+----------------------+----------------------+--------+---------+----------+-----------+
|   apixaban (ELIQUIS) | Take 1 tablet by     |   60   | 0       | 20 |           |
|  2.5 MG tablet       | mouth 2 (two) times  | tablet |         | 18       |           |
|                      | daily.               |        |         |          |           |
+----------------------+----------------------+--------+---------+----------+-----------+
|   atorvastatin       | Take 80 mg by mouth  |        |         | 20 |           |
| (LIPITOR) 80 MG      | nightly.             |        |         | 19       |           |
| tablet               |                      |        |         |          |           |
+----------------------+----------------------+--------+---------+----------+-----------+
|   carvedilol (COREG) | Take 1 tablet by     |   60   | 0       | 20 |  |
|  12.5 MG tablet      | mouth 2 (two) times  | tablet |         | 19       | 0         |
|                      | daily with meals.    |        |         |          |           |
+----------------------+----------------------+--------+---------+----------+-----------+
|   esomeprazole       | Take 1 capsule by    |        |         |          |           |
| (NEXIUM) 40 MG       | mouth every day      |        |         |          |           |
| capsule              |                      |        |         |          |           |
+----------------------+----------------------+--------+---------+----------+-----------+
|                      | Take 1 tablet by     |   30   | 0       | 20 |           |
| HYDROcodone-acetamin | mouth every 4 (four) | tablet |         | 19       |           |
| ophen (NORCO) 5-325  |  hours as needed.    |        |         |          |           |
| MG per tablet        |                      |        |         |          |           |
+----------------------+----------------------+--------+---------+----------+-----------+
|   insulin aspart     | Inject  into the     |        |         |          |           |
| (NOVOLOG) 100        | skin 3 (three) times |        |         |          |           |
| UNIT/ML injection    |  daily before meals. |        |         |          |           |
|                      |  Sliding scale       |        |         |          |           |
+----------------------+----------------------+--------+---------+----------+-----------+
|   insulin degludec   | Inject 21 units      |        |         |          |           |
| (TRESIBA) 100        | every night          |        |         |          |           |
| UNIT/ML injection    |                      |        |         |          |           |
+----------------------+----------------------+--------+---------+----------+-----------+
|   isosorbide         | Take 1 tablet by     |   30   | 1       | 20 |  |
| mononitrate (IMDUR)  | mouth daily.         | tablet |         | 18       | 9         |
| 60 MG 24 hr tablet   |                      |        |         |          |           |
+----------------------+----------------------+--------+---------+----------+-----------+
|   LORazepam (ATIVAN) | Take 0.5 tablets by  |        |         | 02/15/20 |           |
|  1 MG tablet         | mouth every 8        |        |         | 19       |           |
|                      | (eight) hours as     |        |         |          |           |
|                      | needed for Anxiety.  |        |         |          |           |
|                      | Patient states she   |        |         |          |           |
|                      | has the 1mg tablets  |        |         |          |           |
|                      | at home and that     |        |         |          |           |
|                      | they are scored and  |        |         |          |           |
|                      | she will split them  |        |         |          |           |
|                      | in half              |        |         |          |           |
+----------------------+----------------------+--------+---------+----------+-----------+
|   losartan (COZAAR)  | Take 100 mg by mouth |        |         | 20 |           |
| 100 MG tablet        |  daily.              |        |         | 19       |           |
+----------------------+----------------------+--------+---------+----------+-----------+
|   nitroGLYCERIN      | Place 1 tablet under |   30   | 0       | 20 |  |
| (NITROSTAT) 0.4 MG   |  the tongue every 5  | tablet |         | 19       | 0         |
| SL tablet            | (five) minutes as    |        |         |          |           |
|                      | needed for Chest     |        |         |          |           |
|                      | pain.                |        |         |          |           |
+----------------------+----------------------+--------+---------+----------+-----------+
 
|   nortriptyline      | Take 25-75 mg by     |        |         |          |           |
| (PAMELOR) 25 MG      | mouth See Admin      |        |         |          |           |
| capsule              | Instructions. Takes  |        |         |          |           |
|                      | 25 mg by mouth every |        |         |          |           |
|                      |  morning and 75 mg   |        |         |          |           |
|                      | every night          |        |         |          |           |
+----------------------+----------------------+--------+---------+----------+-----------+
|   ondansetron        | Take 4 mg by mouth   |        |         | 20 |           |
| (ZOFRAN-ODT) 4 MG    | every 8 (eight)      |        |         | 18       |           |
| disintegrating       | hours as needed.     |        |         |          |           |
| tablet               |                      |        |         |          |           |
+----------------------+----------------------+--------+---------+----------+-----------+
|   oxybutynin         | Take 7.5 mg by mouth |        |         |          |           |
| (DITROPAN) 5 MG      |  2 (two) times       |        |         |          |           |
| tablet               | daily.               |        |         |          |           |
+----------------------+----------------------+--------+---------+----------+-----------+
|   prochlorperazine 5 | TAKE ONE TABLET BY   |   60   | 11      | 20 |           |
|  MG tablet           | MOUTH TWICE DAILY AS | tablet |         | 19       |           |
|                      |  NEEDED FOR NAUSEA   |        |         |          |           |
+----------------------+----------------------+--------+---------+----------+-----------+
|   rOPINIRole         | Take 0.75 mg by      |        |         |          |           |
| (REQUIP) 0.25 MG     | mouth nightly.       |        |         |          |           |
| tablet               |                      |        |         |          |           |
+----------------------+----------------------+--------+---------+----------+-----------+
|   TRINTELLIX 20 MG   | Take 20 mg by mouth  |        |         | 20 |           |
| TABS                 | every evening.       |        |         | 19       |           |
+----------------------+----------------------+--------+---------+----------+-----------+
 as of this encounter
 
 Progress Notes
 Hortencia Vela RD - 02/15/2019  3:02 PM PSTFormatting of this note may be different from the
 original.
 
  02/15/19 1430 
 Subjective 
 Timepoint Admit
 (Consult - poor appetite) 
 Pt c/o Pt reports she is discharging today.  PO intake much improved.  Pt reports she plans
 on getting set up with a meal service.  Also noted that if she purchases food, caregiver wi
ll prepare meals for her.  Pt states she will eat anything if it prepared for her.  States s
he is discharging and has no needs today 
 Diet Experience 
 Self-selected diet(s) followed Pt reports she sees her RD at [facility] monthly.  She is not on
 a renal diet at this time due to poor po intake.  Pt has been eating a lot of soups even th
ough high in sodium.  Has protein shakes at home, but hasn't been drinking because she read 
an article about a boy who drank too many and  and now she is afraid to drink.   
 Food Intake 
 Amount of Food Pt reports she is eating >50% of meals 
 Type of Food / Meals Renal cardiac  
 Biochemical data, medical tests, and procedures reviewed 
 Biochemical data, medical tests, and procedures reviewed K WNL 
 Recommendations 
 Recommended energy needs Encourage po intake.  Discussed safe use with oral nutrition suppl
ements.  Will follow with RD at [facility].  Monitor and follow as indicated.   
 Nutritional Risk 
 Nutritional risk Moderate 
 Follow up date 19 
 Michael David MD - 02/15/2019  2:11 PM PSTFormatting of this note may be different f
rom the original.
 Infectious Disease
 
 Progress Note
 
 Hospital Day:   LOS: 0 days 
 SUBJECTIVE
 No acute event overnight.  No new complaint.  No fever.
 
 Antibiotics:IV vancomycin was given yesterday x1
 
 Allergy:
 Allergies 
 Allergen Reactions 
   Quinapril Hcl Anaphylaxis 
   Digoxin And Related Other (See Comments) 
   toxic 
   Metaxalone Other (See Comments) 
   Morphine Mental Changes 
   Make her crazy/ "funny feeling in my head"
 Has used hydromorphone in-house 
   Pantoprazole Sodium Nausea and Vomiting 
   Penicillins Rash 
   Tolerates cephalosporins 
   Quinapril Hcl Edema 
   Facial Edema 
   Sudafed [Pseudoephedrine Hcl] Rash 
 
 OBJECTIVE
 Vital Signs:
 /57 (BP Location: Right upper arm)  | Pulse 66  | Temp 98 F (36.7 C) (Oral)  | Re
sp 18  | Ht 1.778 m (5' 10")  | Wt 65.3 kg (144 lb)  | SpO2 96%  | Breastfeeding? No  | BMI 
20.66 kg/m 
 
 Examination:
 
 Constitutional: Resting in the recliner, not in acute distress.She is alert, awake, oriente
d well.
 HEENT: 
 Head: Normocephalic and Atraumatic. 
 Nose: Nose normal. 
 Neck: Neck supple.No palpable mass
 Cardiovascular: Not appreciate rubs or gallops
 Pulmonary/Chest:  Clear to auscultation bilaterally.
 Abdominal: Soft, bowel sounds are present, no distension, no ascites. 
 Musculoskeletal: Right great toe slightly swollen, there is a small skin tear with no surro
unding erythema or purulent drainage.Left TMA stump heals nicely with no evidence of infecti
on.
 Neurological:  No focal neurologic deficits. 
 Skin: Skin is warm and dry. No rash noted. 
 Psychiatric: Alert, oriented well.  No agitation.
 
 LABS:
 Recent Results (from the past 24 hour(s)) 
 Septic Lactic Acid 
  Collection Time: 19  4:55 PM 
 Result Value Ref Range 
  LACTIC ACID 2.7 (H) 0.4 - 2.0 mmol/L 
 C-Reactive Protein 
  Collection Time: 19  5:39 PM 
 Result Value Ref Range 
  CRP 0.8 (H) <0.5 mg/dL 
 CBC with differential 
 
  Collection Time: 19  5:39 PM 
 Result Value Ref Range 
  WBC 5.14 3.80 - 11.00 K/uL 
  RBC 2.91 (L) 3.70 - 5.10 M/uL 
  HGB 10.1 (L) 11.3 - 15.5 g/dL 
  HCT 30.9 (L) 34.0 - 46.0 % 
  .1 (H) 80.0 - 100.0 fl 
  MCH 34.8 (H) 27.0 - 34.0 pg 
  MCHC 32.8 32.0 - 35.5 g/dL 
  RDW SD 61.3 (H) 37 - 53 fl 
   (L) 150 - 400 K/uL 
  MPV 9.3 fl 
  DIFF TYPE AUTOMATED  
  NEUTROPHILS 74.03 % 
  LYMPHOCYTES 20.20 % 
  MONOCYTES 5.16 % 
  EOSINOPHILS 0.03 % 
  BASOPHILS 0.58 % 
  NEUTROPHILS ABS 3.81 1.90 - 7.40 K/uL 
  LYMPHOCYTES ABS 1.04 1.00 - 3.90 K/uL 
  MONOCYTES ABS 0.27 0.00 - 0.80 K/uL 
  EOSINOPHILS ABS 0.00 0.00 - 0.50 K/uL 
  BASOPHILS ABS 0.03 0.00 - 0.10 K/uL 
  MORPHOLOGY 1+  
  Platelet Estimate DECREASED  
 Comprehensive metabolic panel 
  Collection Time: 19  5:39 PM 
 Result Value Ref Range 
  SODIUM 143 135 - 145 mmol/L 
  POTASSIUM 4.7 3.5 - 4.9 mmol/L 
  CHLORIDE 96 (L) 99 - 109 mmol/L 
  CO2 >40 (HH) 23 - 32 mmol/L 
  ANION GAP AGAP UNABLE TO CALCULATE 5 - 20 mmol/L 
  GLUCOSE 160 (H) 65 - 99 mg/dL 
  BUN 9 8 - 25 mg/dL 
  CREATININE 2.96 (H) 0.50 - 1.00 mg/dL 
  BUN/CREAT 3  
  CALCIUM 9.4 8.5 - 10.5 mg/dL 
  TOTAL PROTEIN 6.7 6.3 - 8.2 g/dL 
  Albumin 4.3 3.3 - 4.8 g/dL 
  GLOBULIN 2.4 1.3 - 4.9 g/dL 
  A/G 1.8 1.0 - 2.4 
  TBIL 0.5 0.1 - 1.5 mg/dL 
  ALK PHOS 103 35 - 115 U/L 
  AST 54 (H) 10 - 45 U/L 
  ALT 40 10 - 65 U/L 
  EGFR 16 (L) >60 mL/min/1.73m2 
 Sedimentation Rate (ESR) 
  Collection Time: 19  5:39 PM 
 Result Value Ref Range 
  ESR 13 0 - 30 mm/Hr 
 Vitamin B12 
  Collection Time: 19  5:39 PM 
 Result Value Ref Range 
  VITAMIN B12 553 254 - 1,320 pg/mL 
 Folate 
  Collection Time: 19  5:39 PM 
 Result Value Ref Range 
  FOLATE 8.0 >5.4 ng/mL 
 TSH 
 
  Collection Time: 19  5:39 PM 
 Result Value Ref Range 
  TSH 0.904 0.450 - 5.100 uIU/mL 
 Septic Lactic Acid 
  Collection Time: 19  7:22 PM 
 Result Value Ref Range 
  LACTIC ACID 2.7 (H) 0.4 - 2.0 mmol/L 
 Septic Lactic Acid 
  Collection Time: 19 10:12 PM 
 Result Value Ref Range 
  LACTIC ACID 2.0 0.4 - 2.0 mmol/L 
 POCT glucose 
  Collection Time: 19 10:32 PM 
 Result Value Ref Range 
  GLUCOSE,POC SCREEN 202 (H) 65 - 99 mg/dL 
 POCT glucose 
  Collection Time: 02/15/19  6:01 AM 
 Result Value Ref Range 
  GLUCOSE,POC SCREEN 108 (H) 65 - 99 mg/dL 
 POCT glucose 
  Collection Time: 02/15/19 11:36 AM 
 Result Value Ref Range 
  GLUCOSE,POC SCREEN 231 (H) 65 - 99 mg/dL 
 
 Results  
  Procedure Component Value Units Date/Time 
  Blood Culture Set 2 [[id number]] Collected:  19 1739 
  Specimen:  Blood from Blood, peripheral draw Updated:  19 
  Blood Culture Set 1 [[id number]] Collected:  19 1655 
  Specimen:  Blood from Blood Line Draw Updated:  19 
  
 
 ASSESSMENT & PLAN
 This is a 68-year-old female with multiple comorbidities including End-stage renal disease,
 on regular hemodialysis via fistula at the left upper extremity.  History of severe periphe
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
A resection .  She completed the course of IV antibiotic and the wound is healing nicely wit
h no evidence of ongoing infection.  
 She was admitted at this time for frequent fall at home and noted to have right great toe f
racture, base of distal phalanx.  No intervention recommended at this point.  No evidence of
 ongoing skin and soft tissue infection at present.  Would agree to discharge home and follo
w-up with podiatrist.  Continue to off antibiotic per infectious disease standpoint
 
 Plan discussed with patient at length.  She can follow-up with infectious disease as needed
 if concern for recurrent infection.  Plan discussed with medicine team. Thank you for consu
ation
 
 Michael David MD
 2/15/2019
 
 in this encounter
 
 Plan of Treatment
 
 
+--------+----------+-----------------+----------------------+-------------+
| Date   | Type     | Specialty       | Care Team            | Description |
+--------+----------+-----------------+----------------------+-------------+
| / | Initial  | General Surgery |   Сергей Medeiros, |             |
|    | consult  |                 |  MD NEREYDA DOUGLAS [facility]  |             |
 
|        |          |                 |  [place] [address]  |             |
|        |          |                 |  [phone]        |             |
|        |          |                 | [phone] (Fax)   |             |
+--------+----------+-----------------+----------------------+-------------+
 
 
 
+-------------------------+--------+----------------------+---------------------+
| Name                    | Priori | Associated Diagnoses | Order Schedule      |
|                         | ty     |                      |                     |
+-------------------------+--------+----------------------+---------------------+
| Referral to Home Health | Routin |   Fall in home,      | Ordered: 02/15/2019 |
|                         | e      | initial encounter    |                     |
+-------------------------+--------+----------------------+---------------------+
 as of this encounter
 
 Procedures
 
 
+----------------------+--------+-------------+----------------------+----------------------
+
| Procedure Name       | Priori | Date/Time   | Associated Diagnosis | Comments             
|
|                      | ty     |             |                      |                      
|
+----------------------+--------+-------------+----------------------+----------------------
+
| POCT GLUCOSE         | Routin | 02/15/2019  |                      |   Results for this   
|
|                      | e      | 11:36 AM    |                      | procedure are in the 
|
|                      |        | PST         |                      |  results section.    
|
+----------------------+--------+-------------+----------------------+----------------------
+
| POCT GLUCOSE         | Routin | 02/15/2019  |                      |   Results for this   
|
|                      | e      |  6:01 AM    |                      | procedure are in the 
|
|                      |        | PST         |                      |  results section.    
|
+----------------------+--------+-------------+----------------------+----------------------
+
| POCT GLUCOSE         | Routin | 2019  |                      |   Results for this   
|
|                      | e      | 10:32 PM    |                      | procedure are in the 
|
|                      |        | PST         |                      |  results section.    
|
+----------------------+--------+-------------+----------------------+----------------------
+
| [facility] SEPTIC LACTIC   | STAT   | 2019  |                      |   Results for this   
|
| ACID                 |        | 10:12 PM    |                      | procedure are in the 
|
|                      |        | PST         |                      |  results section.    
|
+----------------------+--------+-------------+----------------------+----------------------
+
| [facility]PT[facility]ACT[facility]   | STAT   | 2019  |                      |   Results for this   
 
|
| ACID                 |        |  7:22 PM    |                      | procedure are in the 
|
|                      |        | PST         |                      |  results section.    
|
+----------------------+--------+-------------+----------------------+----------------------
+
| BLOOD CULTURE, SET 2 | STAT   | 2019  |                      |   Results for this   
|
|                      |        |  5:39 PM    |                      | procedure are in the 
|
|                      |        | PST         |                      |  results section.    
|
+----------------------+--------+-------------+----------------------+----------------------
+
| SEDIMENTATION RATE,  | STAT   | 2019  |                      |   Results for this   
|
| AUTOMATED            |        |  5:39 PM    |                      | procedure are in the 
|
|                      |        | PST         |                      |  results section.    
|
+----------------------+--------+-------------+----------------------+----------------------
+
| CBC W/AUTO DIFF      | STAT   | 2019  |                      |   Results for this   
|
| (REFLEX TO MANUAL)   |        |  5:39 PM    |                      | procedure are in the 
|
|                      |        | PST         |                      |  results section.    
|
+----------------------+--------+-------------+----------------------+----------------------
+
| C-REACTIVE PROTEIN   | STAT   | 2019  |                      |   Results for this   
|
|                      |        |  5:39 PM    |                      | procedure are in the 
|
|                      |        | PST         |                      |  results section.    
|
+----------------------+--------+-------------+----------------------+----------------------
+
| TSH                  | STAT   | 2019  |                      |   Results for this   
|
|                      |        |  5:39 PM    |                      | procedure are in the 
|
|                      |        | PST         |                      |  results section.    
|
+----------------------+--------+-------------+----------------------+----------------------
+
| FOLATE               | Add-On | 2019  |                      |   Results for this   
|
|                      |        |  5:39 PM    |                      | procedure are in the 
|
|                      |        | PST         |                      |  results section.    
|
+----------------------+--------+-------------+----------------------+----------------------
+
| VITAMIN B12          | Add-On | 2019  |                      |   Results for this   
|
|                      |        |  5:39 PM    |                      | procedure are in the 
|
|                      |        | PST         |                      |  results section.    
 
|
+----------------------+--------+-------------+----------------------+----------------------
+
| COMPREHENSIVE        | STAT   | 2019  |                      |   Results for this   
|
| METABOLIC PANEL      |        |  5:39 PM    |                      | procedure are in the 
|
|                      |        | PST         |                      |  results section.    
|
+----------------------+--------+-------------+----------------------+----------------------
+
| DEVEN LEWIS RIGHT        | ASAP   | 2019  |                      |   Results for this   
|
|                      |        |  5:08 PM    |                      | procedure are in the 
|
|                      |        | PST         |                      |  results section.    
|
+----------------------+--------+-------------+----------------------+----------------------
+
| [facility] SEPT[facility]ACT[facility]   | STAT   | 2019  |                      |   Results for this   
|
| ACID                 |        |  4:55 PM    |                      | procedure are in the 
|
|                      |        | PST         |                      |  results section.    
|
+----------------------+--------+-------------+----------------------+----------------------
+
| BLOOD CULTURE, SET 1 | STAT   | 2019  |                      |   Results for this   
|
|                      |        |  4:55 PM    |                      | procedure are in the 
|
|                      |        | PST         |                      |  results section.    
|
+----------------------+--------+-------------+----------------------+----------------------
+
| ED INFORMATION       | Routin | 2019  |                      |   Results for this   
|
| EXCHANGE             | e      |  3:21 PM    |                      | procedure are in the 
|
|                      |        | PST         |                      |  results section.    
|
+----------------------+--------+-------------+----------------------+----------------------
+
 in this encounter
 
 Results
 POCT glucose (02/15/2019 11:36 AM)
 
+--------------------+--------------------------+---------------+-----------------+
| Component          | Value                    | Ref Range     | Performed At    |
+--------------------+--------------------------+---------------+-----------------+
| GLUCOSE,POC SCREEN | 231 (H)Comment: Testing  | 65 - 99 mg/dL | [facility] |
|                    | performed at [facility];[address]     |               |                 |
|                    | [address];[place], [place]   |               |                 |
|                    | [id number]                    |               |                 |
+--------------------+--------------------------+---------------+-----------------+
 
 
 
+-------------------+------------------+--------------------+--------------+
 
| Performing        | Address          | City/State/Zipcode | Phone Number |
| Organization      |                  |                    |              |
+-------------------+------------------+--------------------+--------------+
|   [facility] |   [address] | [place] [address] |              |
+-------------------+------------------+--------------------+--------------+
 POCT glucose (02/15/2019  6:01 AM)
 
+--------------------+--------------------------+---------------+-----------------+
| Component          | Value                    | Ref Range     | Performed At    |
+--------------------+--------------------------+---------------+-----------------+
| GLUCOSE,POC SCREEN | 108 (H)Comment: Testing  | 65 - 99 mg/dL | [facility] |
|                    | performed at [facility];[address]     |               |                 |
|                    | [address];[place]   |               |                 |
|                    | [id number]                    |               |                 |
+--------------------+--------------------------+---------------+-----------------+
 
 
 
+-------------------+------------------+--------------------+--------------+
| Performing        | Address          | City/State/Zipcode | Phone Number |
| Organization      |                  |                    |              |
+-------------------+------------------+--------------------+--------------+
|   [facility] |   [address] | [place] [address] |              |
+-------------------+------------------+--------------------+--------------+
 POCT glucose (2019 10:32 PM)
 
+--------------------+--------------------------+---------------+-----------------+
| Component          | Value                    | Ref Range     | Performed At    |
+--------------------+--------------------------+---------------+-----------------+
| GLUCOSE,POC SCREEN | 202 (H)Comment: Testing  | 65 - 99 mg/dL | [facility] |
|                    | performed at [facility];[address]     |               |                 |
|                    | [address];[place]   |               |                 |
|                    | [id number]                    |               |                 |
+--------------------+--------------------------+---------------+-----------------+
 
 
 
+-------------------+------------------+--------------------+--------------+
| Performing        | Address          | City/State/Zipcode | Phone Number |
| Organization      |                  |                    |              |
+-------------------+------------------+--------------------+--------------+
|   [facility] |   [address] | [place] [address] |              |
+-------------------+------------------+--------------------+--------------+
 Septic Lactic Acid (2019 10:12 PM)
 
+-------------+-------------------------+------------------+-----------------+
| Component   | Value                   | Ref Range        | Performed At    |
+-------------+-------------------------+------------------+-----------------+
| LACTIC ACID | 2.0Comment: Testing     | 0.4 - 2.0 mmol/L | [facility] |
|             | performed at [facility];[address]    |                  |                 |
|             | [facility];[place], [place]  |                  |                 |
|             | [address]                   |                  |                 |
+-------------+-------------------------+------------------+-----------------+
 
 
 
+-------------------+------------------+--------------------+--------------+
| Performing        | Address          | City/State/Zipcode | Phone Number |
| Organization      |                  |                    |              |
+-------------------+------------------+--------------------+--------------+
 
|   [facility] |   [address] | [place] [address] |              |
+-------------------+------------------+--------------------+--------------+
 Septic Lactic Acid (2019  7:22 PM)
 
+-------------+--------------------------+------------------+-----------------+
| Component   | Value                    | Ref Range        | Performed At    |
+-------------+--------------------------+------------------+-----------------+
| LACTIC ACID | 2.7 (H)Comment: Testing  | 0.4 - 2.0 mmol/L | [facility] |
|             | performed at [facility];[address]     |                  |                 |
|             | [address];[place]   |                  |                 |
|             | [id number]                    |                  |                 |
+-------------+--------------------------+------------------+-----------------+
 
 
 
+-------------------+------------------+--------------------+--------------+
| Performing        | Address          | City/State/Zipcode | Phone Number |
| Organization      |                  |                    |              |
+-------------------+------------------+--------------------+--------------+
|   [facility] |   [address] | [place] [address] |              |
+-------------------+------------------+--------------------+--------------+
 TSH (2019  5:39 PM)
 
+-----------+-------------------------+----------------------+-----------------+
| Component | Value                   | Ref Range            | Performed At    |
+-----------+-------------------------+----------------------+-----------------+
| TSH       | 0.904Comment: Testing   | 0.450 - 5.100 uIU/mL | [facility] |
|           | performed at [facility];[address]    |                      |                 |
|           | [address];[place], [place]  |                      |                 |
|           | [address]                   |                      |                 |
+-----------+-------------------------+----------------------+-----------------+
 
 
 
+----------+
| Specimen |
+----------+
| Blood    |
+----------+
 
 
 
+-------------------+------------------+--------------------+--------------+
| Performing        | Address          | City/State/Zipcode | Phone Number |
| Organization      |                  |                    |              |
+-------------------+------------------+--------------------+--------------+
|   [facility] |   [address] | [place] [address] |              |
+-------------------+------------------+--------------------+--------------+
 Folate (2019  5:39 PM)
 
+-----------+--------------------------+------------+--------------+
| Component | Value                    | Ref Range  | Performed At |
+-----------+--------------------------+------------+--------------+
| FOLATE    | 8.0Comment: Testing      | >5.4 ng/mL | [facility]   |
|           | performed at [facility], [address] |            | LABORATORY   |
|           |  [facility],        |            |              |
|           | [place]  [address]     |            |              |
+-----------+--------------------------+------------+--------------+
 
 
 
 
+----------+
| Specimen |
+----------+
| Blood    |
+----------+
 
 
 
+---------------+-------------------------+---------------------+----------------+
| Performing    | Address                 | City/State/Zipcode  | Phone Number   |
| Organization  |                         |                     |                |
+---------------+-------------------------+---------------------+----------------+
|   [facility]  |   [address]  | [place] [address] |   [phone] |
| LABORATORY    | [address]                   |                     |                |
+---------------+-------------------------+---------------------+----------------+
 Vitamin B12 (2019  5:39 PM)
 
+-------------+--------------------------+-------------------+--------------+
| Component   | Value                    | Ref Range         | Performed At |
+-------------+--------------------------+-------------------+--------------+
| VITAMIN B12 | 553Comment: Testing      | 254 - 1,320 pg/mL | [facility]   |
|             | performed at [facility], [address] |                   | LABORATORY   |
|             |  [address],        |                   |              |
|             | [place]  [address]     |                   |              |
+-------------+--------------------------+-------------------+--------------+
 
 
 
+----------+
| Specimen |
+----------+
| Blood    |
+----------+
 
 
 
+---------------+-------------------------+---------------------+----------------+
| Performing    | Address                 | City/State/Zipcode  | Phone Number   |
| Organization  |                         |                     |                |
+---------------+-------------------------+---------------------+----------------+
|   [facility]  |   [address]  | [place] [address] |   [phone] |
| LABORATORY    | [address]                   |                     |                |
+---------------+-------------------------+---------------------+----------------+
 Blood Culture Set 2 (2019  5:39 PM)
 
+----------------------+------------------------+-----------+-----------------+
| Component            | Value                  | Ref Range | Performed At    |
+----------------------+------------------------+-----------+-----------------+
| Specimen Description | BLOOD, PERIPHERAL DRAW |           | [facility]      |
|                      |                        |           | LABORATORY      |
+----------------------+------------------------+-----------+-----------------+
| SPECIAL REQUESTS     | R HAND                 |           | [facility] |
+----------------------+------------------------+-----------+-----------------+
| CULTURE              | NO GROWTH 6 DAYS       |           | [facility]      |
|                      |                        |           | LABORATORY      |
+----------------------+------------------------+-----------+-----------------+
 
 
 
 
+-----------------+
| Specimen        |
+-----------------+
| Blood - Blood,  |
| peripheral draw |
+-----------------+
 
 
 
+-------------------+-------------------------+---------------------+----------------+
| Performing        | Address                 | City/State/Zipcode  | Phone Number   |
| Organization      |                         |                     |                |
+-------------------+-------------------------+---------------------+----------------+
|   [facility]      |   [address]  | [place] [address] |   [phone] |
| LABORATORY        | [address]                   |                     |                |
+-------------------+-------------------------+---------------------+----------------+
|   [facility] |   [address]        | [place] [address]  |                |
+-------------------+-------------------------+---------------------+----------------+
 Sedimentation Rate (ESR) (2019  5:39 PM)
 
+-----------+-------------------------+--------------+-----------------+
| Component | Value                   | Ref Range    | Performed At    |
+-----------+-------------------------+--------------+-----------------+
| ESR       | 13Comment: Testing      | 0 - 30 mm/Hr | [facility] |
|           | performed at [facility];[address]    |              |                 |
|           | [address];[place]  |              |                 |
|           | [address]                   |              |                 |
+-----------+-------------------------+--------------+-----------------+
 
 
 
+----------+
| Specimen |
+----------+
| Blood    |
+----------+
 
 
 
+-------------------+------------------+--------------------+--------------+
| Performing        | Address          | City/State/Zipcode | Phone Number |
| Organization      |                  |                    |              |
+-------------------+------------------+--------------------+--------------+
|   [facility] |   [address] | [place] [address] |              |
+-------------------+------------------+--------------------+--------------+
 Comprehensive metabolic panel (2019  5:39 PM)
 
+----------------+--------------------------+-------------------+-----------------+
| Component      | Value                    | Ref Range         | Performed At    |
+----------------+--------------------------+-------------------+-----------------+
| SODIUM         | 143                      | 135 - 145 mmol/L  | [facility] |
+----------------+--------------------------+-------------------+-----------------+
| POTASSIUM      | 4.7                      | 3.5 - 4.9 mmol/L  | [facility] |
+----------------+--------------------------+-------------------+-----------------+
| CHLORIDE       | 96 (L)                   | 99 - 109 mmol/L   | [facility] |
+----------------+--------------------------+-------------------+-----------------+
| CO2            | >40 (HH)Comment: CALLED  | 23 - 32 mmol/L    | [facility] |
|                | NURSING UNITREAD BACK    |                   |                 |
|                | RESULTS VERIFIEDCALLED   |                   |                 |
|                | TO RN IN ED AT 1830 BY   |                   |                 |
 
|                | LGJ                      |                   |                 |
|                |                          |                   |                 |
+----------------+--------------------------+-------------------+-----------------+
| ANION GAP AGAP | UNABLE TO CALCULATE      | 5 - 20 mmol/L     | [facility] |
+----------------+--------------------------+-------------------+-----------------+
| GLUCOSE        | 160 (H)                  | 65 - 99 mg/dL     | [facility] |
+----------------+--------------------------+-------------------+-----------------+
| BUN            | 9                        | 8 - 25 mg/dL      | [facility] |
+----------------+--------------------------+-------------------+-----------------+
| CREATININE     | 2.96 (H)                 | 0.50 - 1.00 mg/dL | [facility] |
+----------------+--------------------------+-------------------+-----------------+
| BUN/CREAT      | 3                        |                   | [facility] |
+----------------+--------------------------+-------------------+-----------------+
| CALCIUM        | 9.4                      | 8.5 - 10.5 mg/dL  | [facility] |
+----------------+--------------------------+-------------------+-----------------+
| TOTAL PROTEIN  | 6.7                      | 6.3 - 8.2 g/dL    | [facility] |
+----------------+--------------------------+-------------------+-----------------+
| Albumin        | 4.3                      | 3.3 - 4.8 g/dL    | [facility] |
+----------------+--------------------------+-------------------+-----------------+
| GLOBULIN       | 2.4                      | 1.3 - 4.9 g/dL    | [facility] |
+----------------+--------------------------+-------------------+-----------------+
| A/G            | 1.8                      | 1.0 - 2.4         | [facility] |
+----------------+--------------------------+-------------------+-----------------+
| TBIL           | 0.5                      | 0.1 - 1.5 mg/dL   | [facility] |
+----------------+--------------------------+-------------------+-----------------+
| ALK PHOS       | 103                      | 35 - 115 U/L      | [facility] |
+----------------+--------------------------+-------------------+-----------------+
| AST            | 54 (H)                   | 10 - 45 U/L       | [facility] |
+----------------+--------------------------+-------------------+-----------------+
| ALT            | 40                       | 10 - 65 U/L       | [facility] |
+----------------+--------------------------+-------------------+-----------------+
| EGFR           | 16 (L)Comment: GFR <60:  | >60 mL/min/1.73m2 | [facility] |
|                | CHRONIC KIDNEY DISEASE,  |                   |                 |
|                | IF FOUND OVER A 3 MONTH  |                   |                 |
|                | PERIOD.GFR <15: KIDNEY   |                   |                 |
|                | FAILURE.FOR       |                   |                 |
|                | [employer]IC[employer], MULTIPLY THE  |                   |                 |
|                | CALCULATED GFR BY        |                   |                 |
|                | 1.210.This eGFR is       |                   |                 |
|                | calculated using the     |                   |                 |
|                | MDRD IDMS traceable      |                   |                 |
|                | equation.Testing         |                   |                 |
|                | performed at [facility];[address]     |                   |                 |
|                | [facility];[place], [place]   |                   |                 |
|                | [address]                    |                   |                 |
+----------------+--------------------------+-------------------+-----------------+
 
 
 
+----------+
| Specimen |
+----------+
| Blood    |
+----------+
 
 
 
+-------------------+------------------+--------------------+--------------+
| Performing        | Address          | City/State/Zipcode | Phone Number |
| Organization      |                  |                    |              |
 
+-------------------+------------------+--------------------+--------------+
|   [facility] |   [address] | [place] [address] |              |
+-------------------+------------------+--------------------+--------------+
 CBC with differential (2019  5:39 PM)
 
+-------------------+------------------------+-------------------+-----------------+
| Component         | Value                  | Ref Range         | Performed At    |
+-------------------+------------------------+-------------------+-----------------+
| WBC               | 5.14                   | 3.80 - 11.00 K/uL | [facility] |
+-------------------+------------------------+-------------------+-----------------+
| RBC               | 2.91 (L)               | 3.70 - 5.10 M/uL  | [facility] |
+-------------------+------------------------+-------------------+-----------------+
| HGB               | 10.1 (L)               | 11.3 - 15.5 g/dL  | [facility] |
+-------------------+------------------------+-------------------+-----------------+
| HCT               | 30.9 (L)               | 34.0 - 46.0 %     | [facility] |
+-------------------+------------------------+-------------------+-----------------+
| MCV               | 106.1 (H)              | 80.0 - 100.0 fl   | [facility] |
+-------------------+------------------------+-------------------+-----------------+
| MCH               | 34.8 (H)               | 27.0 - 34.0 pg    | [facility] |
+-------------------+------------------------+-------------------+-----------------+
| MCHC              | 32.8                   | 32.0 - 35.5 g/dL  | [facility] |
+-------------------+------------------------+-------------------+-----------------+
| RDW SD            | 61.3 (H)               | 37 - 53 fl        | [facility] |
+-------------------+------------------------+-------------------+-----------------+
| PLT               | 145 (L)                | 150 - 400 K/uL    | [facility] |
+-------------------+------------------------+-------------------+-----------------+
| MPV               | 9.3                    | fl                | [facility] |
+-------------------+------------------------+-------------------+-----------------+
| DIFF TYPE         | AUTOMATED              |                   | [facility] |
+-------------------+------------------------+-------------------+-----------------+
| NEUTROPHILS       | 74.03                  | %                 | [facility] |
+-------------------+------------------------+-------------------+-----------------+
| LYMPHOCYTES       | 20.20                  | %                 | [facility] |
+-------------------+------------------------+-------------------+-----------------+
| MONOCYTES         | 5.16                   | %                 | [facility] |
+-------------------+------------------------+-------------------+-----------------+
| EOSINOPHILS       | 0.03                   | %                 | [facility] |
+-------------------+------------------------+-------------------+-----------------+
| BASOPHILS         | 0.58                   | %                 | [facility] |
+-------------------+------------------------+-------------------+-----------------+
| NEUTROPHILS ABS   | 3.81                   | 1.90 - 7.40 K/uL  | [facility] |
+-------------------+------------------------+-------------------+-----------------+
| LYMPHOCYTES ABS   | 1.04                   | 1.00 - 3.90 K/uL  | [facility] |
+-------------------+------------------------+-------------------+-----------------+
| MONOCYTES ABS     | 0.27                   | 0.00 - 0.80 K/uL  | [facility] |
+-------------------+------------------------+-------------------+-----------------+
| EOSINOPHILS ABS   | 0.00                   | 0.00 - 0.50 K/uL  | [facility] |
+-------------------+------------------------+-------------------+-----------------+
| BASOPHILS ABS     | 0.03                   | 0.00 - 0.10 K/uL  | [facility] |
+-------------------+------------------------+-------------------+-----------------+
| MORPHOLOGY        | 1+                     |                   | [facility] |
|                   | Comment:               |                   |                 |
|                   | ANISO                  |                   |                 |
|                   | 1+                     |                   |                 |
|                   | MACRO                  |                   |                 |
|                   | NORMAL PLT MORPH       |                   |                 |
|                   |                        |                   |                 |
+-------------------+------------------------+-------------------+-----------------+
| Platelet Estimate | DECREASEDComment:      |                   | [facility] |
|                   | Testing performed at   |                   |                 |
 
|                   | [facility];[address]          |                   |                 |
|                   | [address];[place] [address] |                   |                 |
+-------------------+------------------------+-------------------+-----------------+
 
 
 
+----------+
| Specimen |
+----------+
| Blood    |
+----------+
 
 
 
+-------------------+------------------+--------------------+--------------+
| Performing        | Address          | City/State/Zipcode | Phone Number |
| Organization      |                  |                    |              |
+-------------------+------------------+--------------------+--------------+
|   [facility] |   [address] | [place] [address] |              |
+-------------------+------------------+--------------------+--------------+
 C-Reactive Protein (2019  5:39 PM)
 
+-----------+--------------------------+------------+-----------------+
| Component | Value                    | Ref Range  | Performed At    |
+-----------+--------------------------+------------+-----------------+
| CRP       | 0.8 (H)Comment: Testing  | <0.5 mg/dL | [facility] |
|           | performed at [facility];[address]     |            |                 |
|           | [address];[place]   |            |                 |
|           | [address]                    |            |                 |
+-----------+--------------------------+------------+-----------------+
 
 
 
+----------+
| Specimen |
+----------+
| Blood    |
+----------+
 
 
 
+-------------------+------------------+--------------------+--------------+
| Performing        | Address          | City/State/Zipcode | Phone Number |
| Organization      |                  |                    |              |
+-------------------+------------------+--------------------+--------------+
|   [facility] |   [address] | [place] [address] |              |
+-------------------+------------------+--------------------+--------------+
 XR Toe Right 2 View (2019  5:08 PM)
 
+----------------------------------------------------------------------+--------------+
| Impressions                                                          | Performed At |
+----------------------------------------------------------------------+--------------+
|   No radiographic evidence of osteomyelitis seen.    If further      |   [facility]     |
| assessment  is warranted, consider correlation with MRI.  Potential  | RADIOLOGY    |
| acute fracture through the base of the distal phalanx.  Signed by:   |              |
| Gavin South Date/Time: 2019 5:15 PM                      |              |
+----------------------------------------------------------------------+--------------+
 
 
 
 
+------------------------------------------------------------------------+--------------+
| Narrative                                                              | Performed At |
+------------------------------------------------------------------------+--------------+
|   RIGHT TOE  CLINICAL INFORMATION:  Other (see comments) Pain, concern |   [facility]     |
|  for osteomyelitis.  COMPARISON:  XR FOOT LEFT (2018); XR FOOT   | RADIOLOGY    |
| LEFT (2018); XR FOOT LEFT  (2018);  FINDINGS:  Advanced     |              |
| degenerative changes identified involving the interphalangeal  joint   |              |
| of the 1st digit.    On the lateral view, there appears to be  lucency |              |
|  through the base of the phalanx, potentially reflecting an  acute     |              |
| fracture.    No erosive or destructive changes appreciated to  suggest |              |
|  osteomyelitis.                                                        |              |
+------------------------------------------------------------------------+--------------+
 
 
 
+------------------------------------------------------------------------+
| Procedure Note                                                         |
+------------------------------------------------------------------------+
|   Denny, Rad Results In - 2019  5:18 PM PST  RIGHT TOE             |
| CLINICAL INFORMATION:                                                  |
| Other (see comments) Pain, concern for osteomyelitis.                  |
| COMPARISON:                                                            |
| XR FOOT LEFT (2018); XR FOOT LEFT (2018); XR FOOT LEFT     |
| (2018);                                                           |
| FINDINGS:                                                              |
| Advanced degenerative changes identified involving the interphalangeal |
| joint of the 1st digit.  On the lateral view, there appears to be      |
| lucency through the base of the phalanx, potentially reflecting an     |
| acute fracture.  No erosive or destructive changes appreciated to      |
| suggest osteomyelitis.                                                 |
| IMPRESSION:                                                            |
| No radiographic evidence of osteomyelitis seen.  If further assessment |
| is warranted, consider correlation with MRI.                           |
| Potential acute fracture through the base of the distal phalanx.       |
| Signed by: Gavin South                                                 |
| Sign Date/Time: 2019 5:15 PM                                     |
+------------------------------------------------------------------------+
 
 
 
+--------------------+------------------+--------------------+--------------+
| Performing         | Address          | City/State/Zipcode | Phone Number |
| Organization       |                  |                    |              |
+--------------------+------------------+--------------------+--------------+
|   [facility] |   [address] | [place] [address] |              |
+--------------------+------------------+--------------------+--------------+
 Septic Lactic Acid (2019  4:55 PM)
 
+-------------+--------------------------+------------------+-----------------+
| Component   | Value                    | Ref Range        | Performed At    |
+-------------+--------------------------+------------------+-----------------+
| LACTIC ACID | 2.7 (H)Comment: Testing  | 0.4 - 2.0 mmol/L | [facility] |
|             | performed at [facility];[address]     |                  |                 |
|             | [address];[place]   |                  |                 |
|             | [id number]                    |                  |                 |
+-------------+--------------------------+------------------+-----------------+
 
 
 
+-------------------+------------------+--------------------+--------------+
 
| Performing        | Address          | City/State/Zipcode | Phone Number |
| Organization      |                  |                    |              |
+-------------------+------------------+--------------------+--------------+
|   [facility] |   [address] | [place] [address] |              |
+-------------------+------------------+--------------------+--------------+
 Blood Culture Set 1 (2019  4:55 PM)
 
+----------------------+------------------+-----------+-----------------+
| Component            | Value            | Ref Range | Performed At    |
+----------------------+------------------+-----------+-----------------+
| Specimen Description | BLOOD, LINE DRAW |           | [facility]      |
|                      |                  |           | LABORATORY      |
+----------------------+------------------+-----------+-----------------+
| SPECIAL REQUESTS     | R FOREARM        |           | [facility] |
+----------------------+------------------+-----------+-----------------+
| CULTURE              | NO GROWTH 6 DAYS |           | [facility]      |
|                      |                  |           | LABORATORY      |
+----------------------+------------------+-----------+-----------------+
 
 
 
+---------------------+
| Specimen            |
+---------------------+
| Blood - Blood Line  |
| Draw                |
+---------------------+
 
 
 
+-------------------+-------------------------+---------------------+----------------+
| Performing        | Address                 | City/State/Zipcode  | Phone Number   |
| Organization      |                         |                     |                |
+-------------------+-------------------------+---------------------+----------------+
|   [facility]      |   [address]  | [place] [address] |   [phone] |
| LABORATORY        | [place]                   |                     |                |
+-------------------+-------------------------+---------------------+----------------+
|   [facility] |   [address]        | [place] [address]  |                |
+-------------------+-------------------------+---------------------+----------------+
 ED INFORMATION EXCHANGE (2019  3:21 PM)
 
+------------------------------------------------------------------------+--------------+
| Narrative                                                              | Performed At |
+------------------------------------------------------------------------+--------------+
|   [id number]     Criteria Met         Care        |   ED         |
| Guidelines      10 in      Security and Safety  No recent Security   | INFORMATION  |
| Events currently on file     ED Care Guidelines from [facility] -        | EXCHANGE     |
| [place]  Last Updated: 18 10:16 AM         Other Information:    |              |
| Currently being seen by [facility] in [place] for mental health        |              |
| [phone]  These are guidelines and the provider should    |              |
| exercise clinical judgment when providing care.  ED Care Guidelines    |              |
| from [facility]  Last Updated: 17 10:44 AM         Care |              |
|  Coordination:  This patient has been identified as having at          |              |
| leastEmergency Departments visits in the 12 months immediately         |              |
| preceding the date these guidelines were entered.Patient requires      |              |
| education on appropriate ED usage.Emphasize the importance of using    |              |
| outpatient   medical services for the treatment of chronic conditions. |              |
|   Please contact Community Health WorkerWillard at [phone] if   |              |
| patient is seen in ED.  These are guidelines and the provider should   |              |
| exercise clinical judgment when providing care.  These are guidelines  |              |
 
| and the provider should exercise clinical judgment when providing      |              |
| care.           Prescription Drug Report (12 Mo.)  PDMP query found no |              |
|  report.        E.D. Visit Count (12 mo.)  Facility Visits Low Acuity  |              |
|   [facility] 18 0   [facility] 2 0   [facility]   |              |
| [facility] 5 0   Total 25 0   Note: Visits indicate total |              |
|  known visits. Medicaid Low Acuity Dx are the number of primary        |              |
| diagnoses on the Medicaid's Low Acuity dx list.         Recent         |              |
| Emergency Department Visit Summary  Showing 10 most recent visits out  |              |
| of 25 in the past 12 months  Date [facility] State Type Diagnoses   |              |
| or Chief Complaint   2019 [facility]       |              |
| Emergency       2019 [facility]            |              |
| Emergency          Multiple Falls        Referral        Circulatory   |              |
| Problem      2019 [facility] [place] Emergency      |              |
|      ABD PAIN        Other chest pain      2019 [facility]         |              |
| [facility] Emergency          Foot Pain      2019 |              |
|  [facility] Emergency          Chest Pain          |              |
| Nausea        Shortness of Breath        Chest pain, unspecified       |              |
|      Elevated blood-pressure reading, without diagnosis of             |              |
| hypertension        Presence of aortocoronary bypass graft             |              |
| Other specified postprocedural states      2019 [email or web address]  |              |
| [facility] [place] Emergency          CHEST PAIN        Abnormal levels  |              |
| of other serum enzymes        Personal history of other diseases of    |              |
| the circulatory system        Chest pain, unspecified      2019 |              |
|  [email or web address] [place] [place] Emergency          FISTULA BLEEDING    |              |
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
| grafts, initial encounter      Dec 22, 2018 [employer]       |              |
| [place] Emergency          CHEST PAIN        Type 2 diabetes         |              |
| mellitus with hyperglycemia        Chest pain, unspecified      Nov    |              |
| 2018 [facility] [place] Emergency    Chief Complaint:   |              |
| SOB      2018 [employer] [place] Emergency         |              |
|     FALL        Unspecified fall, initial encounter        Dependence  |              |
| on renal dialysis        End stage renal disease            Recent     |              |
| Inpatient Visit Summary  Showing 10 most recent visits out of 11 in    |              |
| the past 12 months  Date [facility] State Type Diagnoses or Chief   |              |
| Complaint   2019 [facility] Vascular       |              |
| Surgery          Foot Pain        End stage renal disease              |              |
| Dependence on renal dialysis        Peripheral vascular disease,       |              |
| unspecified        Anemia in chronic kidney disease        Other       |              |
| disorders of plasma-protein metabolism, not elsewhere classified       |              |
|      Other specified personal risk factors, not elsewhere classified   |              |
|     Dec 27, 2018 [facility] Recovery               |              |
|     Peripheral arterial disease, osteomyelitis left foot        Other  |              |
| acute osteomyelitis, left ankle and foot        Long term (current)    |              |
| use of antibiotics        End stage renal disease        Anemia in     |              |
| chronic kidney disease      Dec 5, 2018 [facility] |              |
|  General Medicine          Peripheral vascular disease, unspecified    |              |
|      End stage renal disease        Dependence on renal dialysis       |              |
|      Long term (current) use of insulin        Other chronic           |              |
| osteomyelitis, left ankle and foot        Type 2 diabetes mellitus     |              |
| with diabetic chronic kidney disease        Essential (primary)        |              |
| hypertension      2018 [facility]          |              |
| Inpatient          Upper GIB        Other chronic osteomyelitis,       |              |
| unspecified ankle and foot        End stage renal disease        Other |              |
|  specified personal risk factors, not elsewhere classified             |              |
| Chronic systolic (congestive) heart failure        Anemia in chronic   |              |
| kidney disease        Other disorders of plasma-protein metabolism,    |              |
| not elsewhere classified        Moderate protein-calorie malnutrition  |              |
|        Other disorders of phosphorus metabolism      2018      |              |
| [facility] [place] [facility]          1. Type 2      |              |
| diabetes mellitus with other specified complication        2. Urinary  |              |
 
| tract infection, site not specified        3. Unspecified              |              |
| protein-calorie malnutrition        4. Other chronic osteomyelitis,    |              |
| unspecified site        5. Hypertensive heart and chronic kidney       |              |
| disease with heart failure and with stage 5 chronic kidney disease, or |              |
|  end stage renal disease        6. Chronic diastolic (congestive)      |              |
| heart failure        7. Other osteomyelitis, ankle and foot        8.  |              |
| Type 2 diabetes mellitus with foot ulcer        9. Atherosclerotic     |              |
| heart disease of native coronary artery without angina pectoris        |              |
|  10. Chronic obstructive pulmonary disease, unspecified      ,    |              |
|  [facility] [place] [facility]          1. Benign |              |
|  paroxysmal vertigo, unspecified ear        2. End stage renal disease |              |
|         3. Hypertensive chronic kidney disease with stage 5 chronic    |              |
| kidney disease or end stage renal disease        4. Urinary tract      |              |
| infection, site not specified        5. Hypertensive heart and chronic |              |
|  kidney disease with heart failure and with stage 5 chronic kidney     |              |
| disease, or end stage renal disease        6. Kidney transplant status |              |
|         7. Unspecified systolic (congestive) heart failure        8.   |              |
| Syncope and collapse        9. Type 2 diabetes mellitus with diabetic  |              |
| chronic kidney disease        10. Atherosclerotic heart disease of     |              |
| native coronary artery without angina pectoris      Sep 15, 2018       |              |
| [facility] [place] [place] [facility]          Acute     |              |
| ischemic heart disease, unspecified        Long term (current) use of  |              |
| insulin        Dependence on renal dialysis        End stage renal     |              |
| disease        Type 2 diabetes mellitus with diabetic chronic kidney   |              |
| disease        Essential (primary) hypertension        Anemia in       |              |
| chronic kidney disease      2018 [facility] |              |
|  Medical Surgical          0. Cough        1. Pneumonia due to         |              |
| Pseudomonas        2. End stage renal disease        3. Hypertensive   |              |
| heart and chronic kidney disease with heart failure and with stage 5   |              |
| chronic kidney disease, or end stage renal disease        4. Cachexia  |              |
|        5. Chronic obstructive pulmonary disease with acute lower       |              |
| respiratory infection        6. Type 2 diabetes mellitus with diabetic |              |
|  chronic kidney disease        7. Heart failure, unspecified        8. |              |
|  Hyperlipidemia, unspecified        9. Gastro-esophageal reflux        |              |
| disease without esophagitis      2018 [facility]       |              |
| [facility]. [place] [facility] Surgical          0. Other chest pain        1.      |              |
| Gastro-esophageal reflux disease without esophagitis        2. End     |              |
| stage renal disease        3. Hypertensive heart and chronic kidney    |              |
| disease with heart failure and with stage 5 chronic kidney disease, or |              |
|  end stage renal disease        4. Chronic systolic (congestive) heart |              |
|  failure        5. Atherosclerotic heart disease of native coronary    |              |
| artery with other forms of angina pectoris        6. Type 2 diabetes   |              |
| mellitus with diabetic chronic kidney disease        7.                |              |
| Hyperlipidemia, unspecified        8. Major depressive disorder,       |              |
| single episode, unspecified        9. Chronic obstructive pulmonary    |              |
| disease, unspecified      Mar 6, 2018 [facility].     |              |
| [facility]. [place] Cardiology          Heart Failure        Need for iv access  |              |
|        Type 2 diabetes mellitus with other specified complication      |              |
|      Long term (current) use of insulin        Paroxysmal atrial       |              |
| fibrillation        Anemia in chronic kidney disease                   |              |
| Atherosclerotic heart disease of native coronary artery without angina |              |
|  pectoris        Cardiomyopathy, unspecified        End stage renal    |              |
| disease        Other specified postprocedural states            Care   |              |
| Providers  Provider PRC Type Phone Fax Service Dates   HEADINGS, SANTIAGO, |              |
|  F.N.P. Nurse Practitioner: Family     Current      Marco Antonio Martinez     |              |
| /Care Coordinator [phone]    2019 -          |              |
| Current      [facility] Primary Care [phone]    2019 |              |
|  - Current      [facility] Primary            |              |
| Care    [phone] Current      [facility]      |              |
| [place] [facility]     Current      [facility] GONZALEZ Primary Care         |              |
 
| Current      [employer] Mental Health Provider [phone] |              |
|  [phone] Current      [employer] Case or Care Manager     Current      |              |
|  MIRTA Goel Case or Care Manager [phone]  |              |
| [phone] Current      Jairo Gutierrez MD Other     Current     |              |
|      [employer]  This patient has registered at the [facility]      |              |
| [facility] Emergency Department   For more information    |              |
| visit:                                                                 |              |
| [email or web address] |              |
| [id number]   The above information is provided for the sole purpose of     |              |
| patient treatment. Use of this information beyond the terms of Data    |              |
| Sharing Memorandum of Understanding and License Agreement is           |              |
| prohibited. In certain cases not all visits may be represented.        |              |
| Consult the aforementioned facilities for additional information.      |              |
| 2019 [employer] - [place], [place] -      |              |
| [email or web address]                                         |              |
+------------------------------------------------------------------------+--------------+
 
 
 
+-------------------------------------------------------------------------------------------
--------------------------------------------------------------------------------------------
--------------------+
| Procedure Note                                                                            
                                                                                            
                    |
+-------------------------------------------------------------------------------------------
--------------------------------------------------------------------------------------------
--------------------+
|   [place], Lab - 2019  3:22 PM PST  Formatting of this note may be different      
                                                                                            
                    |
| from the original.[id number]  Care Guidelines  10 in     
                                                                                            
                    |
| 12Security and SafetyNo recent Security Events currently on fileED Care Guidelines from   
                                                                                            
                    |
| [facility] [place] Updated: 18 10:16 AM  Other Information:Currently being      
                                                                                            
                    |
| [facility] [place] [facility] concerns.[phone] are            
                                                                                            
                    |
| guidelines and the provider should exercise clinical judgment when providing care.ED      
                                                                                            
                    |
| Care Guidelines from [facility] Updated: 17 10:44 AM  Care             
                                                                                            
                    |
| Coordination:This patient has been identified as having at leastEmergency Departments     
                                                                                            
                    |
| visits in the 12 months immediately preceding the date these guidelines were              
                                                                                            
                    |
| entered.Patient requires education on appropriate ED usage.Emphasize the importance of    
                                                                                            
                    |
| using outpatient medical services for the treatment of chronic conditions.Please contact  
                                                                                            
 
                    |
|  Community Health WorkerWillard at [phone] if patient is seen in ED.These are      
                                                                                            
                    |
| guidelines and the provider should exercise clinical judgment when providing care.These   
                                                                                            
                    |
| are guidelines and the provider should exercise clinical judgment when providing          
                                                                                            
                    |
| care.Prescription Drug Report (12 Mo.)PDMP query found no report.E.D. Visit Count (12     
                                                                                            
                    |
| mo.)Facility Visits Low Acuity [facility] 18 0 [facility] 2 0    
                                                                                            
                    |
| [facility] 5 0 Total 25 0 Note: Visits indicate total known visits.   
                                                                                            
                    |
| Medicaid Low Acuity Dx are the number of primary diagnoses on the Medicaid's Low Acuity   
                                                                                            
                    |
| dx list.  Recent Emergency Department Visit SummaryShowing 10 most recent visits out of   
                                                                                            
                    |
| 25 in the past 12 monthsDate Facility City State Type Diagnoses or Chief Complaint Feb    
                                                                                            
                    |
| 2019 [facility] [place]. [place] Emergency   2019 [facility] [place]     
                                                                                            
                    |
| [place]. [place] Emergency    Multiple Falls    Referral    Circulatory Problem  2019     
                                                                                            
                    |
| [facility]. [place] Emergency    ABD PAIN    Other chest pain  2019    
                                                                                            
                    |
| [place] Emergency    Foot Pain  2019 [facility] [place]  
                                                                                            
                    |
|  [place] Emergency    Chest Pain    Nausea    Shortness of Breath    Chest pain,        
                                                                                            
                    |
| unspecified    Elevated blood-pressure reading, without diagnosis of hypertension         
                                                                                            
                    |
| Presence of aortocoronary bypass graft    Other specified postprocedural states  Elvis 16,  
                                                                                            
                    |
|  2019 [facility] [place]. [place] Emergency    CHEST PAIN    Abnormal levels of other  
                                                                                            
                    |
|  serum enzymes    Personal history of other diseases of the circulatory system    Chest   
                                                                                            
                    |
| pain, unspecified  2019 [facility] [place] Emergency    FISTULA        
                                                                                            
                    |
| BLEEDING    Hemorrhage due to vascular prosthetic devices, implants and grafts, initial   
                                                                                            
 
                    |
| encounter  Dec 22, 2018 [facility] [place]. [place] Emergency    CHEST PAIN    Type 2  
                                                                                            
                    |
|  diabetes mellitus with hyperglycemia    Chest pain, unspecified  2018 [place]      
                                                                                            
                    |
| [place] Emergency  Chief Complaint: SOB  2018 [facility]       
                                                                                            
                    |
| [facility] [place]. [place] Emergency    FALL    Unspecified fall, initial encounter    Dependence   
                                                                                            
                    |
| on renal dialysis    End stage renal disease  Recent Inpatient Visit SummaryShowing 10    
                                                                                            
                    |
| most recent visits out of 11 in the past 12 monthsDate Facility City State Type           
                                                                                            
                    |
| Diagnoses or [facility] [place] [facility]         
                                                                                            
                    |
| Surgery    Foot Pain    End stage renal disease    Dependence on renal dialysis           
                                                                                            
                    |
| Peripheral vascular disease, unspecified    Anemia in chronic kidney disease    Other     
                                                                                            
                    |
| disorders of plasma-protein metabolism, not elsewhere classified    Other specified       
                                                                                            
                    |
| personal risk factors, not elsewhere classified  Dec 27, 2018 [facility]        
                                                                                            
                    |
| [place] Recovery    Peripheral arterial disease, osteomyelitis left foot    Other       
                                                                                            
                    |
| acute osteomyelitis, left ankle and foot    Long term (current) use of antibiotics        
                                                                                            
                    |
| End stage renal disease    Anemia in chronic kidney disease  Dec 5, 2018 [facility]  
                                                                                            
                    |
|  [place] General Medicine    Peripheral vascular disease, unspecified    End       
                                                                                            
                    |
| stage renal disease    Dependence on renal dialysis    Long term (current) use of         
                                                                                            
                    |
| insulin    Other chronic osteomyelitis, left ankle and foot    Type 2 diabetes mellitus   
                                                                                            
                    |
| with diabetic chronic kidney disease    Essential (primary) hypertension  2018    
                                                                                            
                    |
[facility] [place] [facility]    Upper GIB    Other chronic osteomyelitis,     
                                                                                            
                    |
| unspecified ankle and foot    End stage renal disease    Other specified personal risk    
                                                                                            
 
                    |
| factors, not elsewhere classified    Chronic systolic (congestive) heart failure          
                                                                                            
                    |
| Anemia in chronic kidney disease    Other disorders of plasma-protein metabolism, not     
                                                                                            
                    |
| elsewhere classified    Moderate protein-calorie malnutrition    Other disorders of       
                                                                                            
                    |
| phosphorus metabolism  2018 [place]. [place] Medical Surgical    1.  
                                                                                            
                    |
|  Type 2 diabetes mellitus with other specified complication    2. Urinary tract           
                                                                                            
                    |
| infection, site not specified    3. Unspecified protein-calorie malnutrition    4. Other  
                                                                                            
                    |
|  chronic osteomyelitis, unspecified site    5. Hypertensive heart and chronic kidney      
                                                                                            
                    |
| disease with heart failure and with stage 5 chronic kidney disease, or end stage renal    
                                                                                            
                    |
| disease    6. Chronic diastolic (congestive) heart failure    7. Other osteomyelitis,     
                                                                                            
                    |
| ankle and foot    8. Type 2 diabetes mellitus with foot ulcer    9. Atherosclerotic       
                                                                                            
                    |
| heart disease of native coronary artery without angina pectoris    10. Chronic            
                                                                                            
                    |
| obstructive pulmonary disease, unspecified  2018 [facility] [place] [place]     
                                                                                            
                    |
| Medical Surgical    1. Benign paroxysmal vertigo, unspecified ear    2. End stage renal   
                                                                                            
                    |
| disease    3. Hypertensive chronic kidney disease with stage 5 chronic kidney disease or  
                                                                                            
                    |
|  end stage renal disease    4. Urinary tract infection, site not specified    5.          
                                                                                            
                    |
| Hypertensive heart and chronic kidney disease with heart failure and with stage 5         
                                                                                            
                    |
| chronic kidney disease, or end stage renal disease    6. Kidney transplant status    7.   
                                                                                            
                    |
| Unspecified systolic (congestive) heart failure    8. Syncope and collapse    9. Type 2   
                                                                                            
                    |
| diabetes mellitus with diabetic chronic kidney disease    10. Atherosclerotic heart       
                                                                                            
                    |
| disease of native coronary artery without angina pectoris  Sep 15, 2018 [facility]    
                                                                                            
 
                    |
| [place]. [place] Medical Surgical    Acute ischemic heart disease, unspecified         
                                                                                            
                    |
| Long term (current) use of insulin    Dependence on renal dialysis    End stage renal     
                                                                                            
                    |
| disease    Type 2 diabetes mellitus with diabetic chronic kidney disease    Essential     
                                                                                            
                    |
| (primary) hypertension    Anemia in chronic kidney disease  2018 [facility]            
                                                                                            
                    |
[facility] [place] [facility]    0. Cough    1. Pneumonia due to Pseudomonas   
                                                                                            
                    |
|    2. End stage renal disease    3. Hypertensive heart and chronic kidney disease with    
                                                                                            
                    |
| heart failure and with stage 5 chronic kidney disease, or end stage renal disease    4.   
                                                                                            
                    |
| Cachexia    5. Chronic obstructive pulmonary disease with acute lower respiratory         
                                                                                            
                    |
| infection    6. Type 2 diabetes mellitus with diabetic chronic kidney disease    7.       
                                                                                            
                    |
| Heart failure, unspecified    8. Hyperlipidemia, unspecified    9. Gastro-esophageal      
                                                                                            
                    |
| reflux disease without esophagitis  2018 [facility] [place] [facility]     
                                                                                            
                    |
| Surgical    0. Other chest pain    1. Gastro-esophageal reflux disease without            
                                                                                            
                    |
| esophagitis    2. End stage renal disease    3. Hypertensive heart and chronic kidney     
                                                                                            
                    |
| disease with heart failure and with stage 5 chronic kidney disease, or end stage renal    
                                                                                            
                    |
| disease    4. Chronic systolic (congestive) heart failure    5. Atherosclerotic heart     
                                                                                            
                    |
| disease of native coronary artery with other forms of angina pectoris    6. Type 2        
                                                                                            
                    |
| diabetes mellitus with diabetic chronic kidney disease    7. Hyperlipidemia, unspecified  
                                                                                            
                    |
|     8. Major depressive disorder, single episode, unspecified    9. Chronic obstructive   
                                                                                            
                    |
| pulmonary disease, unspecified  Mar 6, 2018 [facility]        
                                                                                            
                    |
| Cardiology    Heart Failure    Need for iv access    Type 2 diabetes mellitus with other  
                                                                                            
 
                    |
|  specified complication    Long term (current) use of insulin    Paroxysmal atrial        
                                                                                            
                    |
| fibrillation    Anemia in chronic kidney disease    Atherosclerotic heart disease of      
                                                                                            
                    |
| native coronary artery without angina pectoris    Cardiomyopathy, unspecified    End      
                                                                                            
                    |
| stage renal disease    Other specified postprocedural states  [facility]  
                                                                                            
                    |
|  Type Phone Fax Service Dates HEADINGS, CARLOS ALBERTO HENDERSON. Nurse Practitioner: Family           
                                                                                            
                    |
| Current  Marco Antonio Martinez /Care Coordinator [phone]  2019 -       
                                                                                            
                    |
| Current  Marco Antonio Martinez Primary Care [phone]  2019 - Current  [facility]        
                                                                                            
                    |
| [facility] Primary Care  [phone] [facility]  [facility]  
                                                                                            
                    |
|  [facility]   Current  MANOLO GONZALEZ Primary Care   Current  [facility],    
                                                                                            
                    |
| [facility] [phone] Current  [employer] Case or Care  
                                                                                            
                    |
|  Manager   Current  MIRTA Goel Case or Care Manager [phone]  
                                                                                            
                    |
|  [phone] Current  Jairo Gutierrez MD Other   Current  [employer]      
                                                                                            
                    |
| patient has registered at the [facility] Emergency Department For     
                                                                                            
                    |
| more information visit:                                                                   
                                                                                            
                    |
| [email or web address] The above    
                                                                                            
                    |
| information is provided for the sole purpose of patient treatment. Use of this            
                                                                                            
                    |
| information beyond the terms of Data Sharing Memorandum of Understanding and License      
                                                                                            
                    |
| Agreement is prohibited. In certain cases not all visits may be represented. Consult the  
                                                                                            
                    |
|  aforementioned facilities for additional information. 2019 [facility]            
                                                                                            
                    |
| [email or web address] - [place], [place] - [email or web address]                  
                                                                                            
 
                    |
|   End stage renal disease                                                                 
                                                                                            
                    |
|   Dependence on renal dialysis                                                            
                                                                                            
                    |
|   Long term (current) use of insulin                                                      
                                                                                            
                    |
|   Other chronic osteomyelitis, left ankle and foot                                        
                                                                                            
                    |
|   Type 2 diabetes mellitus with diabetic chronic kidney disease                           
                                                                                            
                    |
|   Essential (primary) hypertension                                                        
                                                                                            
                    |
|                                                                                           
                                                                                            
                    |
|[facility] [place] [place] [facility]                                      
                                                                                            
                    |
|  Upper GIB                                                                                
                                                                                            
                    |
|   Other chronic osteomyelitis, unspecified ankle and foot                                 
                                                                                            
                    |
|   End stage renal disease                                                                 
                                                                                            
                    |
|   Other specified personal risk factors, not elsewhere classified                         
                                                                                            
                    |
|   Chronic systolic (congestive) heart failure                                             
                                                                                            
                    |
|   Anemia in chronic kidney disease                                                        
                                                                                            
                    |
|   Other disorders of plasma-protein metabolism, not elsewhere classified                  
                                                                                            
                    |
|   Moderate protein-calorie malnutrition                                                   
                                                                                            
                    |
|   Other disorders of phosphorus metabolism                                                
                                                                                            
                    |
|                                                                                           
                                                                                            
                    |
|2018 Suzanne Cox. [place] [facility]                                
                                                                                            
                    |
|  1. Type 2 diabetes mellitus with other specified complication                            
                                                                                            
 
                    |
|   2. Urinary tract infection, site not specified                                          
                                                                                            
                    |
|   3. Unspecified protein-calorie malnutrition                                             
                                                                                            
                    |
|   4. Other chronic osteomyelitis, unspecified site                                        
                                                                                            
                    |
|   5. 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
onic kidney disease, or end stage renal disease                                             
                    |
|   6. Chronic diastolic (congestive) heart failure                                         
                                                                                            
                    |
|   7. Other osteomyelitis, ankle and foot                                                  
                                                                                            
                    |
|   8. Type 2 diabetes mellitus with foot ulcer                                             
                                                                                            
                    |
|   9. Atherosclerotic heart disease of native coronary artery without angina pectoris      
                                                                                            
                    |
|   10. Chronic obstructive pulmonary disease, unspecified                                  
                                                                                            
                    |
|                                                                                           
                                                                                            
                    |
|2018 Suzanne Cox. [place] [facility]                                 
                                                                                            
                    |
|  1. Benign paroxysmal vertigo, unspecified ear                                            
                                                                                            
                    |
|   2. End stage renal disease                                                              
                                                                                            
                    |
|   3. Hypertensive chronic kidney disease with stage 5 chronic kidney disease or end stage 
renal disease                                                                               
                    |
|   4. Urinary tract infection, site not specified                                          
                                                                                            
                    |
|   5. 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
onic kidney disease, or end stage renal disease                                             
                    |
|   6. Kidney transplant status                                                             
                                                                                            
                    |
|   7. Unspecified systolic (congestive) heart failure                                      
                                                                                            
                    |
|   8. Syncope and collapse                                                                 
                                                                                            
                    |
|   9. Type 2 diabetes mellitus with diabetic chronic kidney disease                        
                                                                                            
 
                    |
|   10. Atherosclerotic heart disease of native coronary artery without angina pectoris     
                                                                                            
                    |
|                                                                                           
                                                                                            
                    |
|Sep 15, 2018 [facility] JANEEN Vivar. [place] [facility]                           
                                                                                            
                    |
|  Acute ischemic heart disease, unspecified                                                
                                                                                            
                    |
|   Long term (current) use of insulin                                                      
                                                                                            
                    |
|   Dependence on renal dialysis                                                            
                                                                                            
                    |
|   End stage renal disease                                                                 
                                                                                            
                    |
|   Type 2 diabetes mellitus with diabetic chronic kidney disease                           
                                                                                            
                    |
|   Essential (primary) hypertension                                                        
                                                                                            
                    |
|   Anemia in chronic kidney disease                                                        
                                                                                            
                    |
|                                                                                           
                                                                                            
                    |
|2018 Suzanne Cox. [place] [facility]                                
                                                                                            
                    |
|  0. Cough                                                                                 
                                                                                            
                    |
|   1. Pneumonia due to Pseudomonas                                                         
                                                                                            
                    |
|   2. End stage renal disease                                                              
                                                                                            
                    |
|   3. 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
onic kidney disease, or end stage renal disease                                             
                    |
|   4. Cachexia                                                                             
                                                                                            
                    |
|   5. Chronic obstructive pulmonary disease with acute lower respiratory infection         
                                                                                            
                    |
|   6. Type 2 diabetes mellitus with diabetic chronic kidney disease                        
                                                                                            
                    |
|   7. Heart failure, unspecified                                                           
                                                                                            
 
                    |
|   8. Hyperlipidemia, unspecified                                                          
                                                                                            
                    |
|   9. Gastro-esophageal reflux disease without esophagitis                                 
                                                                                            
                    |
|                                                                                           
                                                                                            
                    |
|2018 [facility] Akash Cox. [place] [facility]                                 
                                                                                            
                    |
|  0. Other chest pain                                                                      
                                                                                            
                    |
|   1. Gastro-esophageal reflux disease without esophagitis                                 
                                                                                            
                    |
|   2. End stage renal disease                                                              
                                                                                            
                    |
|   3. 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
onic kidney disease, or end stage renal disease                                             
                    |
|   4. Chronic systolic (congestive) heart failure                                          
                                                                                            
                    |
|   5. 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
ctoris                                                                                      
                    |
|   6. Type 2 diabetes mellitus with diabetic chronic kidney disease                        
                                                                                            
                    |
|   7. Hyperlipidemia, unspecified                                                          
                                                                                            
                    |
|   8. Major depressive disorder, single episode, unspecified                               
                                                                                            
                    |
|   9. Chronic obstructive pulmonary disease, unspecified                                   
                                                                                            
                    |
|                                                                                           
                                                                                            
                    |
|Mar 6, 2018 [facility] JANEEN Ramsay. [place] [facility]                              
                                                                                            
                    |
|  Heart Failure                                                                            
                                                                                            
                    |
|   Need for iv access                                                                      
                                                                                            
                    |
|   Type 2 diabetes mellitus with other specified complication                              
                                                                                            
                    |
|   Long term (current) use of insulin                                                      
                                                                                            
 
                    |
|   Paroxysmal atrial fibrillation                                                          
                                                                                            
                    |
|   Anemia in chronic kidney disease                                                        
                                                                                            
                    |
|   Atherosclerotic heart disease of native coronary artery without angina pectoris         
                                                                                            
                    |
|   Cardiomyopathy, unspecified                                                             
                                                                                            
                    |
|   End stage renal disease                                                                 
                                                                                            
                    |
|   Other specified postprocedural states                                                   
                                                                                            
                    |
|                                                                                           
                                                                                            
                    |
|                                                                                           
                                                                                            
                    |
|                                                                                           
                                                                                            
                    |
|Care Providers                                                                             
                                                                                            
                    |
|Provider PRC Type Phone Fax Service Dates                                                  
                                                                                            
                    |
|HEADINGS, SANTIAGO, F.N.P. Nurse Practitioner: Family   Current                                
                                                                                            
                    |
|[facility] Martinez /Care Coordinator [phone]  2019 - Current         
                                                                                            
                    |
|[facility] [phone]  2019 - Current                          
                                                                                            
                    |
|[facility]  [phone] Current                   
                                                                                            
                    |
[facility]   Current                                
                                                                                            
                    |
|[facility]   Current                                                        
                                                                                            
                    |
|[employer] [facility] Provider [phone] Current                 
                                                                                            
                    |
|[employer] Case or Care Manager   Current                                                   
                                                                                            
                    |
|Willard Ortiz SATISH Case or Care Manager [phone] Current
                                                                                            
 
                    |
|Jairo Gutierrez MD Other   Current                                                       
                                                                                            
                    |
|                                                                                           
                                                                                            
                    |
|Collective Portal                                                                          
                                                                                            
                    |
|This patient has registered at the [facility] Emergency [facility]     
                                                                                            
                    |
|For more information visit: [email or web address]
[email or web address]                                                                               
                    |
|The above information is provided for the sole purpose of patient treatment. Use of this in
formation beyond the terms of Data Sharing Memorandum of Understanding and License Agreement
 is prohibited. In  |
|certain cases not all visits may be represented. Consult the aforementioned facilities for 
additional information.                                                                     
                    |
|2019 [email or web address]. - [place], [place] - [email or web address]                                                                                       
                    |
+-------------------------------------------------------------------------------------------
--------------------------------------------------------------------------------------------
--------------------+
 
 
 
+-------------------+---------+--------------------+--------------+
| Performing        | Address | City/State/Zipcode | Phone Number |
| Organization      |         |                    |              |
+-------------------+---------+--------------------+--------------+
|   ED INFORMATION  |         |                    |              |
| EXCHANGE          |         |                    |              |
+-------------------+---------+--------------------+--------------+
 in this encounter
 
 Visit Diagnoses
 
 
+-------------------------------------------------------------------------------------+
| Diagnosis                                                                           |
+-------------------------------------------------------------------------------------+
|   Fall in home, initial encounter - Primary                                         |
+-------------------------------------------------------------------------------------+
|   Cellulitis of right toe                                                           |
+-------------------------------------------------------------------------------------+
|   Closed displaced fracture of distal phalanx of right great toe, initial encounter |
+-------------------------------------------------------------------------------------+
|   Generalized weakness                                                              |
+-------------------------------------------------------------------------------------+
|   Other malaise and fatigue                                                         |
+-------------------------------------------------------------------------------------+
|   Fatigue, unspecified type                                                         |
+-------------------------------------------------------------------------------------+
 
 
 
 
 Admitting Diagnoses
 
 
+-------------------------------------------------------------------------------------+
| Diagnosis                                                                           |
+-------------------------------------------------------------------------------------+
|   Generalized weakness                                                              |
+-------------------------------------------------------------------------------------+
|   Other malaise and fatigue                                                         |
+-------------------------------------------------------------------------------------+
|   Closed displaced fracture of distal phalanx of right great toe, initial encounter |
+-------------------------------------------------------------------------------------+
|   Fall in home, initial encounter                                                   |
+-------------------------------------------------------------------------------------+
|   Cellulitis of right toe                                                           |
+-------------------------------------------------------------------------------------+
|   Fatigue, unspecified type                                                         |
+-------------------------------------------------------------------------------------+
 
 
 
 Administered Medications
 
 
+------------------+--------+---------+------+------+------+
| Medication Order | MAR    | Action  | Dose | Rate | Site |
|                  | Action | Date    |      |      |      |
+------------------+--------+---------+------+------+------+
 
 
 
+-----------------------------------+---+
|   acetaminophen (TYLENOL)         |   |
| suppository 650 mg  650 mg,       |   |
| Rectal, Every 6 Hours PRN, Mild   |   |
| Pain (1-3), Fever, Starting Thu   |   |
| 19 at 2229                   |   |
+-----------------------------------+---+
|                                   |   |
+-----------------------------------+---+
|   acetaminophen (TYLENOL) tablet  |   |
| 650 mg  650 mg, Oral, Every 6     |   |
| Hours PRN, Mild Pain (1-3),       |   |
| Fever, Starting Thu 19 at    |   |
| 2229                              |   |
+-----------------------------------+---+
|                                   |   |
+-----------------------------------+---+
 
 
 
+-----------------------------------+-------+----------+--------+---+---+
|   apixaban (ELIQUIS) tablet 2.5   | Given |  | 2.5 mg |   |   |
| mg  2.5 mg, Oral, 2 Times Daily,  |       | 9 23:39  |        |   |   |
| First dose on Thu 19 at 2300 |       | PST      |        |   |   |
+-----------------------------------+-------+----------+--------+---+---+
 
 
 
 
+-------+----------+--------+---+---+
| Given | 2/15/201 | 2.5 mg |   |   |
|       | 9 09:40  |        |   |   |
|       | PST      |        |   |   |
+-------+----------+--------+---+---+
 
 
 
+---+---+
|   |   |
+---+---+
 
 
 
+-----------------------------------+-------+----------+-------+---+---+
|   atorvastatin (LIPITOR) tablet   | Given |  | 80 mg |   |   |
| 80 mg  80 mg, Oral, Nightly,      |       | 9 23:38  |       |   |   |
| First dose on Thu 19 at 2300 |       | PST      |       |   |   |
+-----------------------------------+-------+----------+-------+---+---+
 
 
 
+---+---+
|   |   |
+---+---+
 
 
 
+-----------------------------------+-------+----------+---------+---+---+
|   carvedilol (COREG) tablet 12.5  | Given |  | 12.5 mg |   |   |
| mg  12.5 mg, Oral, 2 Times Daily  |       | 9 23:38  |         |   |   |
| With Meals, First dose on Thu     |       | PST      |         |   |   |
| 19 at 2300                   |       |          |         |   |   |
+-----------------------------------+-------+----------+---------+---+---+
 
 
 
+-------+----------+---------+---+---+
| Given | 2/15/201 | 12.5 mg |   |   |
|       | 9 09:41  |         |   |   |
|       | PST      |         |   |   |
+-------+----------+---------+---+---+
 
 
 
+---+---+
|   |   |
+---+---+
 
 
 
+-----------------------------------+---------+----------+-----+-------+---+
|   cefTAZidime (FORTAZ) 2 g in     | New Bag |  | 2 g | 100   |   |
| sodium chloride (IV) 0.9 % 50 mL  |         | 9 18:05  |     | mL/hr |   |
| IVPB  2 g, Intravenous,           |         | PST      |     |       |   |
| Administer over 30 Minutes, Once, |         |          |     |       |   |
|  Thu 19 at 1700, For 1 dose  |         |          |     |       |   |
+-----------------------------------+---------+----------+-----+-------+---+
 
 
 
 
+-----------------------------------+---+
|                                   |   |
+-----------------------------------+---+
|   dextrose 10 % infusion  at      |   |
| 0-100 mL/hr, Intravenous,         |   |
| Continuous PRN, Hypoglycemia,     |   |
| Starting Thu 19 at , For |   |
|  BG 70 or less run infusion at    |   |
| 100 mL/ hr. Discontinue when BG   |   |
| increases to 100 mg/dl or greater |   |
|  x 2-3 hours and patient is       |   |
| eating. Call provider if BG not   |   |
| maintained after 2-3 hours.       |   |
+-----------------------------------+---+
|                                   |   |
+-----------------------------------+---+
|   dextrose 50 % solution 12 mL    |   |
| 12 mL, Intravenous, PRN,          |   |
| Hypoglycemia (BG < 70 mg/dL),     |   |
| Starting Thu 19 at       |   |
+-----------------------------------+---+
|                                   |   |
+-----------------------------------+---+
|   dextrose 50 % solution 25 mL    |   |
| 25 mL, Intravenous, PRN,          |   |
| Hypoglycemia (BG < 70 mg/dL),     |   |
| Starting Thu 19 at       |   |
+-----------------------------------+---+
|                                   |   |
+-----------------------------------+---+
|   glucagon (GLUCAGEN) injection   |   |
| 0.5 mg  0.5 mg, Intramuscular,    |   |
| PRN, Hypoglycemia, (BG < 70       |   |
| mg/dL), Starting Thu 19 at   |   |
|                               |   |
+-----------------------------------+---+
|                                   |   |
+-----------------------------------+---+
|   glucagon (GLUCAGEN) injection 1 |   |
|  mg  1 mg, Intramuscular, PRN,    |   |
| Hypoglycemia, (BG < 70 mg/dL),    |   |
| Starting Thu 19 at       |   |
+-----------------------------------+---+
|                                   |   |
+-----------------------------------+---+
 
 
 
+-----------------------------------+-------+----------+----------+---+---+
|   HYDROcodone-acetaminophen       | Given |  | 1 tablet |   |   |
| (NORCO) 5-325 MG per tablet 1     |       | 9 17:51  |          |   |   |
| tablet  1 tablet, Oral, Once, Thu |       | PST      |          |   |   |
|  19 at 1740, For 1 dose      |       |          |          |   |   |
+-----------------------------------+-------+----------+----------+---+---+
 
 
 
+---+---+
|   |   |
 
+---+---+
 
 
 
+-----------------------------------+-------+----------+----------+---+---+
|   HYDROcodone-acetaminophen       | Given |  | 1 tablet |   |   |
| (NORCO) 5-325 MG per tablet 1     |       | 9 23:39  |          |   |   |
| tablet  1 tablet, Oral, Every 4   |       | PST      |          |   |   |
| Hours PRN, Moderate Pain (4-6),   |       |          |          |   |   |
| Severe Pain (7-10), Starting Thu  |       |          |          |   |   |
| 19 at                    |       |          |          |   |   |
+-----------------------------------+-------+----------+----------+---+---+
 
 
 
+-------+----------+----------+---+---+
| Given | 2/15/201 | 1 tablet |   |   |
|       | 9 04:30  |          |   |   |
|       | PST      |          |   |   |
+-------+----------+----------+---+---+
| Given | 2/15/201 | 1 tablet |   |   |
|       | 9 09:39  |          |   |   |
|       | PST      |          |   |   |
+-------+----------+----------+---+---+
 
 
 
+---+---+
|   |   |
+---+---+
 
 
 
+-----------------------------------+-------+----------+----------+---+---+
|   insulin glargine (LANTUS)       | Given | 2/15/201 | 21 Units |   |   |
| injection 21 Units  21 Units,     |       | 9 00:29  |          |   |   |
| Subcutaneous, Nightly, First dose |       | PST      |          |   |   |
|  on Fri 2/15/19 at 0000           |       |          |          |   |   |
+-----------------------------------+-------+----------+----------+---+---+
 
 
 
+---+---+
|   |   |
+---+---+
 
 
 
+-----------------------------------+-------+----------+---------+---+---+
|   insulin lispro (human)          | Given |  | 3 Units |   |   |
| (HUMALOG) injection 0-10 Units    |       | 9 23:36  |         |   |   |
| 0-10 Units, Subcutaneous, 3 Times |       | PST      |         |   |   |
|  Daily Before Meals, First dose   |       |          |         |   |   |
| on Thu 19 at 2300            |       |          |         |   |   |
+-----------------------------------+-------+----------+---------+---+---+
 
 
 
+-------+----------+---------+---+---+
| Given | 2/15/201 | 3 Units |   |   |
 
|       | 9 12:25  |         |   |   |
|       | PST      |         |   |   |
+-------+----------+---------+---+---+
 
 
 
+---+---+
|   |   |
+---+---+
 
 
 
+-----------------------------------+-------+----------+---------+---+---+
|   insulin lispro (human)          | Given |  | 1 Units |   |   |
| (HUMALOG) injection 0-5 Units     |       | 9 23:36  |         |   |   |
| 0-5 Units, Subcutaneous, Nightly, |       | PST      |         |   |   |
|  First dose on Thu 19 at     |       |          |         |   |   |
| 2300                              |       |          |         |   |   |
+-----------------------------------+-------+----------+---------+---+---+
 
 
 
+---+---+
|   |   |
+---+---+
 
 
 
+-----------------------------------+-------+----------+-------+---+---+
|   isosorbide mononitrate 24 hr    | Given | 2/15/201 | 60 mg |   |   |
| tablet 60 mg  60 mg, Oral, Daily, |       | 9 09:40  |       |   |   |
|  First dose on Fri 2/15/19 at     |       | PST      |       |   |   |
| 0900                              |       |          |       |   |   |
+-----------------------------------+-------+----------+-------+---+---+
 
 
 
+---+---+
|   |   |
+---+---+
 
 
 
+-----------------------------------+-------+----------+------+---+---+
|   LORazepam (ATIVAN) tablet 1 mg  | Given | 2/15/201 | 1 mg |   |   |
|  1 mg, Oral, Every 8 Hours PRN,   |       | 9 13:33  |      |   |   |
| Anxiety, Starting Fri 2/15/19 at  |       | PST      |      |   |   |
| 1247                              |       |          |      |   |   |
+-----------------------------------+-------+----------+------+---+---+
 
 
 
+---+---+
|   |   |
+---+---+
 
 
 
+-----------------------------------+-------+----------+--------+---+---+
|   losartan (COZAAR) tablet 100 mg | Given | 2/15/201 | 100 mg |   |   |
 
|   100 mg, Oral, Daily, First dose |       | 9 09:41  |        |   |   |
|  on Fri 2/15/19 at 0900           |       | PST      |        |   |   |
+-----------------------------------+-------+----------+--------+---+---+
 
 
 
+---+---+
|   |   |
+---+---+
 
 
 
+-----------------------------------+-------+----------+-------+---+---+
|   nortriptyline (PAMELOR) capsule | Given | 2/15/201 | 25 mg |   |   |
|  25 mg  25 mg, Oral, Every        |       | 9 09:39  |       |   |   |
| Morning, First dose on Fri        |       | PST      |       |   |   |
| 2/15/19 at 0900                   |       |          |       |   |   |
+-----------------------------------+-------+----------+-------+---+---+
 
 
 
+---+---+
|   |   |
+---+---+
 
 
 
+-----------------------------------+-------+----------+-------+---+---+
|   nortriptyline (PAMELOR) capsule | Given | 2/15/201 | 75 mg |   |   |
|  75 mg  75 mg, Oral, Nightly,     |       | 9 00:30  |       |   |   |
| First dose on Fri 2/15/19 at 0000 |       | PST      |       |   |   |
+-----------------------------------+-------+----------+-------+---+---+
 
 
 
+-----------------------------------+---+
|                                   |   |
+-----------------------------------+---+
|   ondansetron (ZOFRAN) injection  |   |
| 4 mg  4 mg, Intravenous, Every 6  |   |
| Hours PRN, Nausea, Vomiting,      |   |
| Starting Thu 19 at 2229      |   |
+-----------------------------------+---+
|                                   |   |
+-----------------------------------+---+
|   ondansetron (ZOFRAN-ODT)        |   |
| disintegrating tablet 4 mg  4 mg, |   |
|  Oral, Every 6 Hours PRN, Nausea, |   |
|  Vomiting, Starting Thu 19   |   |
| at 2229                           |   |
+-----------------------------------+---+
|                                   |   |
+-----------------------------------+---+
 
 
 
+-----------------------------------+-------+----------+--------+---+---+
|   oxybutynin (DITROPAN) tablet    | Given |  | 7.5 mg |   |   |
| 7.5 mg  7.5 mg, Oral, 2 Times     |       | 9 23:38  |        |   |   |
| Daily, First dose on Thu 19  |       | PST      |        |   |   |
 
| at 2300                           |       |          |        |   |   |
+-----------------------------------+-------+----------+--------+---+---+
 
 
 
+-------+----------+--------+---+---+
| Given | 2/15/201 | 7.5 mg |   |   |
|       | 9 09:39  |        |   |   |
|       | PST      |        |   |   |
+-------+----------+--------+---+---+
 
 
 
+---+---+
|   |   |
+---+---+
 
 
 
+-----------------------------------+-------+----------+-------+---+---+
|   pantoprazole (PROTONIX) EC      | Given | 2/15/201 | 40 mg |   |   |
| tablet 40 mg  40 mg, Oral, Every  |       | 9 06:25  |       |   |   |
| Morning Before Breakfast, First   |       | PST      |       |   |   |
| dose on Fri 2/15/19 at 0630       |       |          |       |   |   |
+-----------------------------------+-------+----------+-------+---+---+
 
 
 
+---+---+
|   |   |
+---+---+
 
 
 
+-----------------------------------+-------+----------+---------+---+---+
|   rOPINIRole (REQUIP) tablet 0.75 | Given |  | 0.75 mg |   |   |
|  mg  0.75 mg, Oral, Nightly,      |       | 9 23:39  |         |   |   |
| First dose on Thu 19 at 2200 |       | PST      |         |   |   |
+-----------------------------------+-------+----------+---------+---+---+
 
 
 
+---+---+
|   |   |
+---+---+
 
 
 
+-----------------------------------+-------+----------+--------+---+---+
|   sodium chloride (PF) 0.9 %      | Given |  | 10 mLs |   |   |
| flush 10 mL  10 mL, Intravenous,  |       | 9 23:39  |        |   |   |
| Every 8 Hours, First dose on Thu  |       | PST      |        |   |   |
| 19 at 2300                   |       |          |        |   |   |
+-----------------------------------+-------+----------+--------+---+---+
 
 
 
+-------+----------+--------+---+---+
| Given | 2/15/201 | 10 mLs |   |   |
|       | 9 06:26  |        |   |   |
 
|       | PST      |        |   |   |
+-------+----------+--------+---+---+
 
 
 
+---+---+
|   |   |
+---+---+
 
 
 
+----------------------------------+---------+----------+----------+---+---+
|   vancomycin (VANCOCIN) 1500     | New Bag |  | 1,500 mg |   |   |
| mg/250 mL IVPB  1,500 mg,        |         | 9 18:41  |          |   |   |
| Intravenous, Administer over 90  |         | PST      |          |   |   |
| Minutes, Once, Thu 19 at    |         |          |          |   |   |
| 1700, For 1 dose                 |         |          |          |   |   |
+----------------------------------+---------+----------+----------+---+---+
 
 
 
+---+---+
|   |   |
+---+---+
 in this encounter

## 2019-04-19 NOTE — XMS
Encounter Summary
  Created on: 2019
 
 Cherie Villalobos
 External Reference #: [id number]
 : 48
 Sex: Female
 
 Demographics
 
 
+-----------------------+--------------------------+
| Address               | [address]               |
|                       | [place]  [id number] |
+-----------------------+--------------------------+
| Home Phone            | [phone]          |
+-----------------------+--------------------------+
| Preferred Language    | Unknown                  |
+-----------------------+--------------------------+
| Marital Status        |                   |
+-----------------------+--------------------------+
| Latter-day Affiliation | 1013                     |
+-----------------------+--------------------------+
| Race                  | Unknown                  |
+-----------------------+--------------------------+
| Ethnic Group          | Unknown                  |
+-----------------------+--------------------------+
 
 
 Author
 
 
+--------------+-----------------------+
| Author       | Sherry Embue |
+--------------+-----------------------+
| Organization | [facility] |
+--------------+-----------------------+
| Address      | Unknown               |
+--------------+-----------------------+
| Phone        | Unavailable           |
+--------------+-----------------------+
 
 
 
 Support
 
 
+---------------+--------------+---------------------+-----------------+
| Name          | Relationship | Address             | Phone           |
+---------------+--------------+---------------------+-----------------+
| Anoop Villalobos | ECON         | [place], | [phone] |
|               |              |  [place]  [id number]     |                 |
+---------------+--------------+---------------------+-----------------+
| Jennifer Dickson | ECON         | [place]       | [phone] |
|               |              | [id number]               |                 |
+---------------+--------------+---------------------+-----------------+
 
 
 
 
 Care Team Providers
 
 
+-----------------------+------+-----------------+
| Care Team Member Name | Role | Phone           |
+-----------------------+------+-----------------+
| Ivy Couch DO      | PCP  | [phone] |
+-----------------------+------+-----------------+
 
 
 
 Encounter Details
 
 
+--------+------------+----------------------+-----------+-------------+
| Date   | Type       | Department           | Care Team | Description |
+--------+------------+----------------------+-----------+-------------+
| / | Procedure  |   [facility]    |           |             |
| 2019   | Pass       | [facility]   |           |             |
|        |            | Floor [address] |           |             |
|        |            |   [address] [facility]     |           |             |
|        |            | [place] [address]   |           |             |
|        |            | [phone]         |           |             |
+--------+------------+----------------------+-----------+-------------+
 
 
 
 Social History
 
 
+---------------+------------+-----------+--------+------------------+
| Tobacco Use   | Types      | Packs/Day | Years  | Date             |
|               |            |           | Used   |                  |
+---------------+------------+-----------+--------+------------------+
| Former Smoker | Cigarettes | 1         | 30     | Quit: 2004 |
+---------------+------------+-----------+--------+------------------+
 
 
 
+---------------------+---+---+---+
| Smokeless Tobacco:  |   |   |   |
| Never Used          |   |   |   |
+---------------------+---+---+---+
 
 
 
+------------------------------+
| Comments: quite smoking  |
+------------------------------+
 
 
 
+-------------+-----------+---------+----------+
| Alcohol Use | Drinks/We | oz/Week | Comments |
|             | ek        |         |          |
+-------------+-----------+---------+----------+
| No          |           |         |          |
+-------------+-----------+---------+----------+
 
 
 
 
+------------------+---------------+
| Sex Assigned at  | Date Recorded |
| Birth            |               |
+------------------+---------------+
| Not on file      |               |
+------------------+---------------+
 as of this encounter
 
 Plan of Treatment
 
 
+--------+----------+-----------------+----------------------+-------------+
| Date   | Type     | Specialty       | Care Team            | Description |
+--------+----------+-----------------+----------------------+-------------+
| / | Initial  | General Surgery |   Сергей Medeiros, |             |
| 2019   | consult  |                 |  MD [address]  |             |
|        |          |                 |  [place] [address]  |             |
|        |          |                 |  [phone]        |             |
|        |          |                 | [phone] (Fax)   |             |
+--------+----------+-----------------+----------------------+-------------+
 as of this encounter
 
 Visit Diagnoses
 Not on filein this encounter

## 2019-04-19 NOTE — XMS
Encounter Summary
  Created on: 2019
 
 Cherie Villalobos
 External Reference #: [id number]
 : 48
 Sex: Female
 
 Demographics
 
 
+-----------------------+--------------------------+
| Address               | [address]               |
|                       | [place]  [address] |
+-----------------------+--------------------------+
| Home Phone            | [phone]          |
+-----------------------+--------------------------+
| Preferred Language    | Unknown                  |
+-----------------------+--------------------------+
| Marital Status        |                   |
+-----------------------+--------------------------+
| Anabaptist Affiliation | 1013                     |
+-----------------------+--------------------------+
| Race                  | Unknown                  |
+-----------------------+--------------------------+
| Ethnic Group          | Unknown                  |
+-----------------------+--------------------------+
 
 
 Author
 
 
+--------------+--------------------------------------------+
| Author       | [facility]  |
|              | and [place]                                |
+--------------+--------------------------------------------+
| Organization | [facility]  |
|              | and [place]                                |
+--------------+--------------------------------------------+
| Address      | Unknown                                    |
+--------------+--------------------------------------------+
| Phone        | Unavailable                                |
+--------------+--------------------------------------------+
 
 
 
 Support
 
 
+---------------+--------------+---------------------+-----------------+
| Name          | Relationship | Address             | Phone           |
+---------------+--------------+---------------------+-----------------+
| Anoop Villalobos | ECON         | [address], | [phone] |
|               |              |  [place]  [address]     |                 |
+---------------+--------------+---------------------+-----------------+
| Jennifer Dickson | ECON         | [place]       | [phone] |
|               |              | [phone]               |                 |
+---------------+--------------+---------------------+-----------------+
 
 
 
 
 Care Team Providers
 
 
+--------------------------+------+-----------------+
| Care Team Member Name    | Role | Phone           |
+--------------------------+------+-----------------+
| Araseli Montelongo Activity  | PCP  | [phone] |
| Professional             |      |                 |
+--------------------------+------+-----------------+
 
 
 
 Reason for Visit
 
 
+--------+----------+
| Reason | Comments |
+--------+----------+
| Other  | angina   |
+--------+----------+
 
 
 
 Encounter Details
 
 
+--------+-----------+----------------------+----------------------+----------------+
| Date   | Type      | Department           | Care Team            | Description    |
+--------+-----------+----------------------+----------------------+----------------+
| / | Telephone |   [facility]          |   Johnie John, | Other (angina) |
| 2019   |           | CARDIOLOGY  [address]    |  MD  [address] |                |
|        |           | [place]  [place], |  St.  [place],   |                |
|        |           |  [place] [address]       | [place] [address]             |                |
|        |           | [phone]         | [phone]         |                |
|        |           |                      | [phone] (Fax)   |                |
+--------+-----------+----------------------+----------------------+----------------+
 
 
 
 Social History
 
 
+---------------+------------+-----------+--------+------------------+
| Tobacco Use   | Types      | Packs/Day | Years  | Date             |
|               |            |           | Used   |                  |
+---------------+------------+-----------+--------+------------------+
| Former Smoker | Cigarettes | 1         | 30     | Quit: 2004 |
+---------------+------------+-----------+--------+------------------+
 
 
 
+---------------------+---+---+---+
| Smokeless Tobacco:  |   |   |   |
| Never Used          |   |   |   |
+---------------------+---+---+---+
 
 
 
 
+-------------+-----------+---------+----------+
| Alcohol Use | Drinks/We | oz/Week | Comments |
|             | ek        |         |          |
+-------------+-----------+---------+----------+
| No          |           |         |          |
+-------------+-----------+---------+----------+
 
 
 
+------------------+---------------+
| Sex Assigned at  | Date Recorded |
| Birth            |               |
+------------------+---------------+
| Not on file      |               |
+------------------+---------------+
 
 
 
+----------------+-------------+-------------+
| Job Start Date | Occupation  | Industry    |
+----------------+-------------+-------------+
| Not on file    | Not on file | Not on file |
+----------------+-------------+-------------+
 
 
 
+----------------+--------------+------------+
| Travel History | Travel Start | Travel End |
+----------------+--------------+------------+
 
 
 
+-------------------------------------+
| No recent travel history available. |
+-------------------------------------+
 documented as of this encounter
 
 Functional Status
 
 
+---------------------------------------------+----------+--------------------+
| Functional Status                           | Response | Date of Assessment |
+---------------------------------------------+----------+--------------------+
| Are you deaf or do you have serious         | No       | 2018         |
| difficulty hearing?                         |          |                    |
+---------------------------------------------+----------+--------------------+
| Are you blind or do you have serious        | No       | 2018         |
| difficulty seeing, even when wearing        |          |                    |
| glasses?                                    |          |                    |
+---------------------------------------------+----------+--------------------+
| Do you have serious difficulty walking or   | No       | 2018         |
| climbing stairs? (5 years old or older)     |          |                    |
+---------------------------------------------+----------+--------------------+
| Do you have difficulty dressing or bathing? | No       | 2018         |
|  (5 years old or older)                     |          |                    |
+---------------------------------------------+----------+--------------------+
| Because of a physical, mental, or emotional | No       | 2018         |
|  condition, do you have difficulty doing    |          |                    |
| errands alone such as visiting a doctor's   |          |                    |
 
| office or shopping? [15 years old or        |          |                    |
| older)]                                     |          |                    |
+---------------------------------------------+----------+--------------------+
 
 
 
+---------------------------------------------+----------+--------------------+
| Cognitive Status                            | Response | Date of Assessment |
+---------------------------------------------+----------+--------------------+
| Because of a physical, mental, or emotional | No       | 2018         |
|  condition, do you have serious difficulty  |          |                    |
| concentrating, remembering, or making       |          |                    |
| decisions? (5 years old or older)           |          |                    |
+---------------------------------------------+----------+--------------------+
 documented as of this encounter
 
 Plan of Treatment
 
 
+--------+---------+------------+----------------------+-------------+
| Date   | Type    | Specialty  | Care Team            | Description |
+--------+---------+------------+----------------------+-------------+
| / | Office  | Cardiology |   Johnie John, |             |
|    | Visit   |            |  MD  [address] |             |
|        |         |            |  [place]. [place],   |             |
|        |         |            | [place] [address]             |             |
|        |         |            | [phone]         |             |
|        |         |            | [phone] (Fax)   |             |
+--------+---------+------------+----------------------+-------------+
 documented as of this encounter
 
 Visit Diagnoses
 Not on filedocumented in this encounter

## 2019-04-19 NOTE — XMS
Encounter Summary
  Created on: 2019
 
 Cherie Villalobos
 External Reference #: [id number]
 : 48
 Sex: Female
 
 Demographics
 
 
+-----------------------+--------------------------+
| Address               | [address]               |
|                       | [place]  [address] |
+-----------------------+--------------------------+
| Home Phone            | [phone]          |
+-----------------------+--------------------------+
| Preferred Language    | Unknown                  |
+-----------------------+--------------------------+
| Marital Status        |                   |
+-----------------------+--------------------------+
| Islam Affiliation | 1013                     |
+-----------------------+--------------------------+
| Race                  | Unknown                  |
+-----------------------+--------------------------+
| Ethnic Group          | Unknown                  |
+-----------------------+--------------------------+
 
 
 Author
 
 
+--------------+--------------------------------------------+
| Author       | [facility]  |
|              | and [place]                                |
+--------------+--------------------------------------------+
| Organization | [facility]  |
|              | and [place]                                |
+--------------+--------------------------------------------+
| Address      | Unknown                                    |
+--------------+--------------------------------------------+
| Phone        | Unavailable                                |
+--------------+--------------------------------------------+
 
 
 
 Support
 
 
+---------------+--------------+---------------------+-----------------+
| Name          | Relationship | Address             | Phone           |
+---------------+--------------+---------------------+-----------------+
| Anoop Villalobos | ECON         | [address], | [phone] |
|               |              |  [place]  [address]     |                 |
+---------------+--------------+---------------------+-----------------+
| Jennifer Dickson | ECON         | [place]       | [phone] |
|               |              | [phone]               |                 |
+---------------+--------------+---------------------+-----------------+
 
 
 
 
 Care Team Providers
 
 
+-----------------------+------+-------------+
| Care Team Member Name | Role | Phone       |
+-----------------------+------+-------------+
 PCP  | Unavailable |
+-----------------------+------+-------------+
 
 
 
 Reason for Visit
 
 
+---------------+----------+
| Reason        | Comments |
+---------------+----------+
| Device Check  | Billed   |
| (Remote)      |          |
+---------------+----------+
 
 
 
 Encounter Details
 
 
+--------+----------+----------------------+----------------------+----------------------+
| Date   | Type     | Department           | Care Team            | Description          |
+--------+----------+----------------------+----------------------+----------------------+
| / | Implant  |   [facility]          |   Johnie John, | Remote Device        |
| 2019   | Monitor  | CARDIOLOGY  [address]    |  MD  [address] [place] | Interrogation        |
|        |          | [place]  [place], |  [address]  [place],   | (Primary Dx); ICD    |
|        |          |  [place] [address]       | [place] [address]             | (implantable         |
|        |          | [phone]         | [phone]         | cardioverter-defibri |
|        |          |                      | [phone] (Fax)   | llator) [employer]       |
|        |          |                      |                      | Scientific  18   |
|        |          |                      |                      | [place];           |
|        |          |                      |                      | Cardiomyopathy,      |
|        |          |                      |                      | unspecified type     |
|        |          |                      |                      | (HCC)                |
+--------+----------+----------------------+----------------------+----------------------+
 
 
 
 Social History
 
 
+---------------+------------+-----------+--------+------------------+
| Tobacco Use   | Types      | Packs/Day | Years  | Date             |
|               |            |           | Used   |                  |
+---------------+------------+-----------+--------+------------------+
| Former Smoker | Cigarettes | 1         | 30     | Quit: 2004 |
+---------------+------------+-----------+--------+------------------+
 
 
 
+---------------------+---+---+---+
 
| Smokeless Tobacco:  |   |   |   |
| Never Used          |   |   |   |
+---------------------+---+---+---+
 
 
 
+-------------+-----------+---------+----------+
| Alcohol Use | Drinks/We | oz/Week | Comments |
|             | ek        |         |          |
+-------------+-----------+---------+----------+
| No          |           |         |          |
+-------------+-----------+---------+----------+
 
 
 
+------------------+---------------+
| Sex Assigned at  | Date Recorded |
| Birth            |               |
+------------------+---------------+
| Not on file      |               |
+------------------+---------------+
 
 
 
+----------------+-------------+-------------+
| Job Start Date | Occupation  | Industry    |
+----------------+-------------+-------------+
| Not on file    | Not on file | Not on file |
+----------------+-------------+-------------+
 
 
 
+----------------+--------------+------------+
| Travel History | Travel Start | Travel End |
+----------------+--------------+------------+
 
 
 
+-------------------------------------+
| No recent travel history available. |
+-------------------------------------+
 documented as of this encounter
 
 Functional Status
 
 
+---------------------------------------------+----------+--------------------+
| Functional Status                           | Response | Date of Assessment |
+---------------------------------------------+----------+--------------------+
| Are you deaf or do you have serious         | No       | 2018         |
| difficulty hearing?                         |          |                    |
+---------------------------------------------+----------+--------------------+
| Are you blind or do you have serious        | No       | 2018         |
| difficulty seeing, even when wearing        |          |                    |
| glasses?                                    |          |                    |
+---------------------------------------------+----------+--------------------+
| Do you have serious difficulty walking or   | No       | 2018         |
| climbing stairs? (5 years old or older)     |          |                    |
+---------------------------------------------+----------+--------------------+
| Do you have difficulty dressing or bathing? | No       | 2018         |
 
|  (5 years old or older)                     |          |                    |
+---------------------------------------------+----------+--------------------+
| Because of a physical, mental, or emotional | No       | 2018         |
|  condition, do you have difficulty doing    |          |                    |
| errands alone such as visiting a doctor's   |          |                    |
| office or shopping? [15 years old or        |          |                    |
| older)]                                     |          |                    |
+---------------------------------------------+----------+--------------------+
 
 
 
+---------------------------------------------+----------+--------------------+
| Cognitive Status                            | Response | Date of Assessment |
+---------------------------------------------+----------+--------------------+
| Because of a physical, mental, or emotional | No       | 2018         |
|  condition, do you have serious difficulty  |          |                    |
| concentrating, remembering, or making       |          |                    |
| decisions? (5 years old or older)           |          |                    |
+---------------------------------------------+----------+--------------------+
 documented as of this encounter
 
 Plan of Treatment
 
 
+--------+---------+------------+----------------------+-------------+
| Date   | Type    | Specialty  | Care Team            | Description |
+--------+---------+------------+----------------------+-------------+
| / | Office  | Cardiology |   Johnie John, |             |
|    | Visit   |            |  MD  [address] |             |
|        |         |            |  [place]. [place],   |             |
|        |         |            | [place] [address]             |             |
|        |         |            | [phone]         |             |
|        |         |            | [phone] (Fax)   |             |
+--------+---------+------------+----------------------+-------------+
 documented as of this encounter
 
 Procedures
 
 
+-----------------+--------+-------------+----------------------+----------------------+
| Procedure Name  | Priori | Date/Time   | Associated Diagnosis | Comments             |
|                 | ty     |             |                      |                      |
+-----------------+--------+-------------+----------------------+----------------------+
| DEVICE          | Routin | 2019  |   Remote Device      |   Results for this   |
| INTERROGATION-  | e      | 23:59 PDT   | Interrogation   ICD  | procedure are in the |
| REMOTE          |        |             | (implantable         |  results section.    |
|                 |        |             | cardioverter-defibri |                      |
|                 |        |             | llator) [employer]       |                      |
|                 |        |             | Scientific  18   |                      |
|                 |        |             | [place]            |                      |
|                 |        |             | Cardiomyopathy,      |                      |
|                 |        |             | unspecified type     |                      |
|                 |        |             | ([facility])                |                      |
+-----------------+--------+-------------+----------------------+----------------------+
 documented in this encounter
 
 Results
 Device Interrogation - Remote (2019 23:59 PDT)
 
+-----------------------------------------------------------------------+--------------+
 
| Narrative                                                             | Performed At |
+-----------------------------------------------------------------------+--------------+
|   This result has an attachment that is not available.  Johnie        |   [facility]    |
| MD [place]         2019 15:05Date of Remote Interrogation:    |              |
| 19 Refer to [employer] documentation and remote PDF scanned into    |              |
| EPIC for remote interrogation results.    Data collected by Clementina SALAZAR     |              |
| NAHUN Maynard Presenting rhythm:    sinus rhythm 59-60 beats.0           |              |
| ventricular high rate episodes. No tachycardia therapies recommended  |              |
| or delivered.Histogram    good.     Battery longevity                 |              |
| 12    years.Apparent normal and stable device function.Device         |              |
| interrogation due in office in 2019.                        |              |
|recommended or delivered.                                              |              |
|Histogram    good.     Battery longevity 12    years.                 |              |
|Apparent normal and stable device function.                            |              |
|Device interrogation due in office in 2019.                  |              |
|                                                                       |              |
+-----------------------------------------------------------------------+--------------+
 
 
 
+--------------+---------+--------------------+--------------+
| Performing   | Address | City/State/Zipcode | Phone Number |
| Organization |         |                    |              |
+--------------+---------+--------------------+--------------+
|   [facility]    |         |                    |              |
+--------------+---------+--------------------+--------------+
 documented in this encounter
 
 Visit Diagnoses
 
 
+------------------------------------------------------------------------------------+
| Diagnosis                                                                          |
+------------------------------------------------------------------------------------+
|   Remote Device Interrogation  - Primary  Fitting and adjustment of automatic      |
| implantable cardiac defibrillator                                                  |
+------------------------------------------------------------------------------------+
|   ICD (implantable cardioverter-defibrillator) [employer]  18 [place] |
+------------------------------------------------------------------------------------+
|   Cardiomyopathy, unspecified type (HCC)                                           |
+------------------------------------------------------------------------------------+
 documented in this encounter

## 2019-04-19 NOTE — XMS
Clinical Summary
  Created on: 2019
 
 Cherie Jo
 External Reference #: [id number]
 : 48
 Sex: Female
 
 Demographics
 
 
+-----------------------+--------------------------+
| Address               | [address]               |
|                       | [place]  [address] |
+-----------------------+--------------------------+
| Home Phone            | [phone]          |
+-----------------------+--------------------------+
| Preferred Language    | Unknown                  |
+-----------------------+--------------------------+
| Marital Status        |                   |
+-----------------------+--------------------------+
| Voodoo Affiliation | 1013                     |
+-----------------------+--------------------------+
| Race                  | Unknown                  |
+-----------------------+--------------------------+
| Ethnic Group          | Unknown                  |
+-----------------------+--------------------------+
 
 
 Author
 
 
+--------------+-----------------------+
| Author       | [employer] |
+--------------+-----------------------+
| Organization | [facility] |
+--------------+-----------------------+
| Address      | Unknown               |
+--------------+-----------------------+
| Phone        | Unavailable           |
+--------------+-----------------------+
 
 
 
 Support
 
 
+---------------+--------------+---------------------+-----------------+
| Name          | Relationship | Address             | Phone           |
+---------------+--------------+---------------------+-----------------+
| Anoop Jo | ECON         | [place], | [phone] |
|               |              |  [place]  [address]     |                 |
+---------------+--------------+---------------------+-----------------+
| Jennifer Dickson | ECON         | [place]       | [phone] |
|               |              | [id number]               |                 |
+---------------+--------------+---------------------+-----------------+
 
 
 
 
 Care Team Providers
 
 
+-----------------------+------+-----------------+
| Care Team Member Name | Role | Phone           |
+-----------------------+------+-----------------+
| Ivy Couch DO      | PP   | [phone] |
+-----------------------+------+-----------------+
 
 
 
 Allergies
 
 
+---------------------+----------------------+----------+----------+----------------------+
| Active Allergy      | Reactions            | Severity | Noted    | Comments             |
|                     |                      |          | Date     |                      |
+---------------------+----------------------+----------+----------+----------------------+
| Digitoxin           | Other (See Comments) | High     | 20 |   Toxic, patient     |
|                     |                      |          | 18       | states her eyes were |
|                     |                      |          |          |  also affected "she  |
|                     |                      |          |          | seen yellow          |
|                     |                      |          |          | everywhere"          |
+---------------------+----------------------+----------+----------+----------------------+
| Digoxin And Related | Other (See Comments) | Medium   | 20 |   toxic              |
|                     |                      |          | 18       |                      |
+---------------------+----------------------+----------+----------+----------------------+
| Metaxalone          | Other (See Comments) | Medium   |          |                      |
+---------------------+----------------------+----------+----------+----------------------+
| Morphine            | Mental Changes,      | High     | 20 |   "makes me feel     |
|                     | Other (See           |          | 12       | jittery"  Other      |
|                     | Comments), Anxiety   |          |          | reaction(s): MAKES   |
|                     |                      |          |          | HER "CRAZY"          |
|                     |                      |          |          | Hallucinations  Make |
|                     |                      |          |          |  her crazy/ "funny   |
|                     |                      |          |          | feeling in my head"  |
|                     |                      |          |          |  Has used            |
|                     |                      |          |          | hydromorphone        |
|                     |                      |          |          | in-house             |
+---------------------+----------------------+----------+----------+----------------------+
| Pantoprazole Sodium | Nausea and Vomiting  | Medium   | 20 |                      |
|                     |                      |          | 18       |                      |
+---------------------+----------------------+----------+----------+----------------------+
| Penicillin G        | Hives                | High     | 20 |                      |
|                     |                      |          | 10       |                      |
+---------------------+----------------------+----------+----------+----------------------+
| Penicillins         | Rash, Hives          | High     | 20 |   Other reaction(s): |
|                     |                      |          | 11       |  RASH  Tolerates     |
|                     |                      |          |          | cephalosporins       |
+---------------------+----------------------+----------+----------+----------------------+
| Quinapril Hcl       | Edema                | Medium   | 20 |   Facial Edema       |
|                     |                      |          | 13       |                      |
+---------------------+----------------------+----------+----------+----------------------+
| Quinapril Hcl       | Anaphylaxis          | High     | 20 |                      |
|                     |                      |          | 18       |                      |
+---------------------+----------------------+----------+----------+----------------------+
| Quinapril Hcl       | Angioedema           | High     | 20 |   Facial Edema       |
|                     |                      |          | 13       |                      |
+---------------------+----------------------+----------+----------+----------------------+
 
| Pseudoephedrine Hcl | Rash                 | Medium   | 20 |                      |
|                     |                      |          | 11       |                      |
+---------------------+----------------------+----------+----------+----------------------+
 
 
 
 Current Medications
 
 
+----------------------+----------------------+--------+---------+------+------+-------+
| Prescription         | Sig.                 | Disp.  | Refills | Star | End  | Statu |
|                      |                      |        |         | t    | Date | s     |
|                      |                      |        |         | Date |      |       |
+----------------------+----------------------+--------+---------+------+------+-------+
|   rOPINIRole         | Take 0.75 mg by      |        |         |      |      | Activ |
| (REQUIP) 0.25 MG     | mouth nightly.       |        |         |      |      | e     |
| tablet               |                      |        |         |      |      |       |
+----------------------+----------------------+--------+---------+------+------+-------+
|   nortriptyline      | Take 25-75 mg by     |        |         |      |      | Activ |
| (PAMELOR) 25 MG      | mouth See Admin      |        |         |      |      | e     |
| capsule              | Instructions. Takes  |        |         |      |      |       |
|                      | 25 mg by mouth every |        |         |      |      |       |
|                      |  morning and 75 mg   |        |         |      |      |       |
|                      | every night          |        |         |      |      |       |
+----------------------+----------------------+--------+---------+------+------+-------+
|   oxybutynin         | Take 7.5 mg by mouth |        |         |      |      | Activ |
| (DITROPAN) 5 MG      |  2 (two) times       |        |         |      |      | e     |
| tablet               | daily.               |        |         |      |      |       |
+----------------------+----------------------+--------+---------+------+------+-------+
|   albuterol          | Inhale 2 puffs into  |        |         |      |      | Activ |
| (PROVENTIL           | the lungs every 4    |        |         |      |      | e     |
| HFA;VENTOLIN HFA)    | (four) hours as      |        |         |      |      |       |
| 108 (90 Base)        | needed for Wheezing. |        |         |      |      |       |
| MCG/ACT              |                      |        |         |      |      |       |
| inhalerIndications:  |                      |        |         |      |      |       |
| states uses 2x       |                      |        |         |      |      |       |
| monthly average      |                      |        |         |      |      |       |
+----------------------+----------------------+--------+---------+------+------+-------+
|   insulin aspart     | Inject  into the     |        |         |      |      | Activ |
| (NOVOLOG) 100        | skin 3 (three) times |        |         |      |      | e     |
| UNIT/ML injection    |  daily before meals. |        |         |      |      |       |
|                      |  Sliding scale       |        |         |      |      |       |
+----------------------+----------------------+--------+---------+------+------+-------+
|   apixaban (ELIQUIS) | Take 1 tablet by     |   60   | 0       | 12/0 |      | Activ |
|  2.5 MG tablet       | mouth 2 (two) times  | tablet |         | 3/20 |      | e     |
|                      | daily.               |        |         | 18   |      |       |
+----------------------+----------------------+--------+---------+------+------+-------+
|   isosorbide         | Take 1 tablet by     |   30   | 1       | 12/0 | 12/0 | Activ |
| mononitrate (IMDUR)  | mouth daily.         | tablet |         | 5/20 | 5/20 | e     |
| 60 MG 24 hr tablet   |                      |        |         | 18   | 19   |       |
+----------------------+----------------------+--------+---------+------+------+-------+
|   ondansetron        | Take 4 mg by mouth   |        |         | 09/ |      | Activ |
| (ZOFRAN-ODT) 4 MG    | every 8 (eight)      |        |         | / |      | e     |
| disintegrating       | hours as needed.     |        |         | 18   |      |       |
| tablet               |                      |        |         |      |      |       |
+----------------------+----------------------+--------+---------+------+------+-------+
|   insulin degludec   | Inject 21 units      |        |         |      |      | Activ |
| (TRESIBA) 100        | every night          |        |         |      |      | e     |
| UNIT/ML injection    |                      |        |         |      |      |       |
+----------------------+----------------------+--------+---------+------+------+-------+
 
|   esomeprazole       | Take 1 capsule by    |        |         |      |      | Activ |
| (NEXIUM) 40 MG       | mouth every day      |        |         |      |      | e     |
| capsule              |                      |        |         |      |      |       |
+----------------------+----------------------+--------+---------+------+------+-------+
|                      | Take 1 tablet by     |   30   | 0       | 01/0 |      | Activ |
| HYDROcodone-acetamin | mouth every 4 (four) | tablet |         | 4/20 |      | e     |
| ophen (NORCO) 5-325  |  hours as needed.    |        |         | 19   |      |       |
| MG per tablet        |                      |        |         |      |      |       |
+----------------------+----------------------+--------+---------+------+------+-------+
|   carvedilol (COREG) | Take 1 tablet by     |   60   | 0       | 01/1 | 01/1 | Activ |
|  12.5 MG tablet      | mouth 2 (two) times  | tablet |         | /20 | 7/20 | e     |
|                      | daily with meals.    |        |         | 19   | 20   |       |
+----------------------+----------------------+--------+---------+------+------+-------+
|   nitroGLYCERIN      | Place 1 tablet under |   30   | 0       |  |  | Activ |
| (NITROSTAT) 0.4 MG   |  the tongue every 5  | tablet |         | /20 | 7 | e     |
| SL tablet            | (five) minutes as    |        |         | 19   | 20   |       |
|                      | needed for Chest     |        |         |      |      |       |
|                      | pain.                |        |         |      |      |       |
+----------------------+----------------------+--------+---------+------+------+-------+
|   prochlorperazine 5 | TAKE ONE TABLET BY   |   60   | 11      |  |      | Activ |
|  MG tablet           | MOUTH TWICE DAILY AS | tablet |         |  |      | e     |
|                      |  NEEDED FOR NAUSEA   |        |         | 19   |      |       |
+----------------------+----------------------+--------+---------+------+------+-------+
|   losartan (COZAAR)  | Take 100 mg by mouth |        |         | /2 |      | Activ |
| 100 MG tablet        |  daily.              |        |         | 020 |      | e     |
|                      |                      |        |         | 19   |      |       |
+----------------------+----------------------+--------+---------+------+------+-------+
|   TRINTELLIX 20 MG   | Take 20 mg by mouth  |        |         | /2 |      | Activ |
| TABS                 | every evening.       |        |         | 020 |      | e     |
|                      |                      |        |         | 19   |      |       |
+----------------------+----------------------+--------+---------+------+------+-------+
|   atorvastatin       | Take 80 mg by mouth  |        |         | 02/0 |      | Activ |
| (LIPITOR) 80 MG      | nightly.             |        |         | 3/20 |      | e     |
| tablet               |                      |        |         | 19   |      |       |
+----------------------+----------------------+--------+---------+------+------+-------+
|   LORazepam (ATIVAN) | Take 0.5 tablets by  |        |         | 02/1 |      | Activ |
|  1 MG tablet         | mouth every 8        |        |         | 5/20 |      | e     |
|                      | (eight) hours as     |        |         | 19   |      |       |
|                      | needed for Anxiety.  |        |         |      |      |       |
|                      | Patient states she   |        |         |      |      |       |
|                      | has the 1mg tablets  |        |         |      |      |       |
|                      | at home and that     |        |         |      |      |       |
|                      | they are scored and  |        |         |      |      |       |
|                      | she will split them  |        |         |      |      |       |
|                      | in half              |        |         |      |      |       |
+----------------------+----------------------+--------+---------+------+------+-------+
|   glucose blood test | Contour Next Test    |        |         |      |      | Activ |
|  strip               | Strips USE 1 STRIP   |        |         |      |      | e     |
|                      | TO CHECK GLUCOSE     |        |         |      |      |       |
|                      | THREE TIMES DAILY    |        |         |      |      |       |
|                      | FOR 30 DAYS          |        |         |      |      |       |
+----------------------+----------------------+--------+---------+------+------+-------+
|   LORazepam (ATIVAN) |                      |        |         | 02/2 |      | Activ |
|  0.5 MG tablet       |                      |        |         | 1/20 |      | e     |
|                      |                      |        |         | 19   |      |       |
+----------------------+----------------------+--------+---------+------+------+-------+
|   traMADol (ULTRAM)  | Take 1 tablet by     |   30   | 0       | 02/2 |      | Activ |
| 50 MG tablet         | mouth every 6 (six)  | tablet |         | 8/20 |      | e     |
|                      | hours as needed for  |        |         | 19   |      |       |
|                      | Pain.                |        |         |      |      |       |
 
+----------------------+----------------------+--------+---------+------+------+-------+
|   calcium acetate,   | Take 1 tablet every  |        |         |      |      | Activ |
| Phos Binder,         | day by oral route.   |        |         |      |      | e     |
| (PHOSLO) 667 MG      |                      |        |         |      |      |       |
| tablet               |                      |        |         |      |      |       |
+----------------------+----------------------+--------+---------+------+------+-------+
|   colestipol         | 1 tab daily.         |        |         |      |      | Activ |
| (COLESTID) 1 g       | Increase dose by 1   |        |         |      |      | e     |
| tablet               | tab every 3 days to  |        |         |      |      |       |
|                      | a max of 2 tabs      |        |         |      |      |       |
|                      | twice daily to       |        |         |      |      |       |
|                      | control diarrhea     |        |         |      |      |       |
+----------------------+----------------------+--------+---------+------+------+-------+
 
 
 
 Active Problems
 
 
+-----------------------------------------------------+------------+
| Problem                                             | Noted Date |
+-----------------------------------------------------+------------+
| Macrocytosis                                        | 2019 |
+-----------------------------------------------------+------------+
| Lactic acidosis                                     | 2019 |
+-----------------------------------------------------+------------+
| Falls frequently                                    | 2019 |
+-----------------------------------------------------+------------+
| Restless legs syndrome                              | 2019 |
+-----------------------------------------------------+------------+
| Overactive bladder                                  | 2019 |
+-----------------------------------------------------+------------+
| CAD (coronary artery disease)                       | 2018 |
+-----------------------------------------------------+------------+
| Chronic multifocal osteomyelitis of left foot (HCC) | 2018 |
+-----------------------------------------------------+------------+
| PVD (peripheral vascular disease) (HCC)             | 2018 |
+-----------------------------------------------------+------------+
| COPD (chronic obstructive pulmonary disease) (HCC)  | 2018 |
+-----------------------------------------------------+------------+
| Mixed hyperlipidemia                                | 2018 |
+-----------------------------------------------------+------------+
 
 
 
+---------------------------------------------------------------+
|   Overview:   Last Assessment & Plan:                         |
| Moderate dose statin and don't titrate due to ESRD and ASHD.  |
| - Continue Atorvastatin 20mg                                  |
+---------------------------------------------------------------+
 
 
 
+-------------------------------------------------------+------------+
| ICD (implantable cardioverter-defibrillator) in place | 2018 |
+-------------------------------------------------------+------------+
 
 
 
+-------------------------------------------------------------------+
 
|   Overview:   Overview: Formatting of this note may be different  |
| from the original. MODEL NAME MODEL# SERIAL# DATE IMPLANTED       |
| GENERATOR [employer] Dynagen EL ICD [id number] [id number] |
|  RV LEAD [employer] [place] [employer] [place] [id number]  |
| Indication: Nonischemic dilated cardiopathy with persistent       |
| severely depressed left ventricular systolic function, LVEF of    |
| LVEF of 30%                                                       |
+-------------------------------------------------------------------+
 
 
 
+-----------------------------------------------+------------+
| Implantable defibrillator reprogramming/check | 2018 |
+-----------------------------------------------+------------+
| Pleural effusion                              | 2018 |
+-----------------------------------------------+------------+
 
 
 
+-------------------------------------------------------------------+
|   Overview:   Last Assessment & Plan: Post MV repair and CABG x2  |
| 2018 and recurrent pleural effusions and elevated ESR.       |
| Plan:Received 1 time dose of Colchicine Started on Prednisone per |
|  surgery CXR today shows stable bilateral pleural effusions       |
|Received 1 time dose of Colchicine                                 |
|Started on Prednisone per surgery                                  |
|CXR today shows stable bilateral pleural effusions                 |
+-------------------------------------------------------------------+
 
 
 
+-------------------------------------------------+------------+
| Prolonged Q-T interval on ECG                   | 2018 |
+-------------------------------------------------+------------+
| Chronic systolic congestive heart failure (HCC) | 2018 |
+-------------------------------------------------+------------+
 
 
 
+-------------------------------------------------------------------+
|   Overview:   Overview: Echocardiogram 2018 shows mild       |
| biatrial dilatation, mild left ventricular dilatation with a mild |
|  eccentric left ventricular hypertrophy, there is a moderate      |
| global hypokinesis of left ventricle, lvef is 30-35%, normal      |
| right ventricular size and wall thickness with a mildly reduced   |
| right ventricular systolic function, mildly thickened and         |
| calcified trileaflet aortic valve with adequate opening, there is |
|  a mild aortic valve insufficiency, mildly thickened and          |
| calcified mitral valve suggesting myxomatous change with evidence |
|  of mitral valve repair, there is at least moderate central       |
| mitral valve regurgitation, mild tricuspid valve regurgitation,   |
| mild to moderate pulmonary hypertension with a peak systolic      |
| pressure of 50-55 mmhg, normal ivc with normal respiratory        |
| collapse, when compared to echocardiography on 3/7/18, left       |
| ventricular systolicsystolic function is slightly                 |
| improved.Echocardiogram 2018 show overall left ventricular    |
| systolic function is severely impaired with, an EF between 20 -   |
| 25 %, there is mild aortic regurgitation, there is mild aortic    |
| stenosis present, mild-to-moderate tricuspid regurgitation        |
| present, there is mild pulmonary hypertension, pleural effusion   |
 
| present. Duglas Ornelas MD, FACC; [facility] Last Assessment & Plan: EF |
|  25% s/p CABG on 18 but now down to 15%. Plan:Continue Coreg |
|  and Losartan Hemodialysis for fluid removal.Strict I/O and daily |
|  weights.                                                         |
+-------------------------------------------------------------------+
 
 
 
+------------------+------------+
| Chronic diarrhea | 2018 |
+------------------+------------+
 
 
 
+---------------------------------------------+
|   Overview:   Colitis as working diagnosis. |
+---------------------------------------------+
 
 
 
+---------------------------------------------+------------+
| Chronic anticoagulation                     | 2018 |
+---------------------------------------------+------------+
| Plantar ulcer (HCC)                         | 2018 |
+---------------------------------------------+------------+
| Steroid-induced hyperglycemia               | 2018 |
+---------------------------------------------+------------+
| Moderate protein-calorie malnutrition (HCC) | 2018 |
+---------------------------------------------+------------+
| Stress hyperglycemia                        | 2018 |
+---------------------------------------------+------------+
| Cardiomyopathy (HCC)                        | 2018 |
+---------------------------------------------+------------+
 
 
 
+-------------------------------------------------------------------+
|   Overview:   Overview: Ischemic (3 V CAD) and non ischemic       |
| (Hypertension and MR and DM and ESRD). Tolerated metoprolol,      |
| losartan but avoid spironolactone due to ESRD. Last Assessment &  |
| Plan: Severe ischemic cardiomyopathy (EF 15%) and ESRD on HD who  |
| presented with dyspnea related to bilateral pleural effusions and |
|  episodic AF with RVR now s/p thoracentesis with significant      |
| improvement in her symptoms and in NSR. Plan:Volume removal with  |
| dialysis as toleratedLasix 80 mg twice a day Coreg and low dose   |
| losartan. Up titrate as tolerated but working on fluid removal    |
| and maintaining adequate BP. Strict I/O with daily weights.       |
|Strict I/O with daily weights.                                     |
+-------------------------------------------------------------------+
 
 
 
+--------------------------------------+------------+
| Paroxysmal atrial fibrillation (HCC) | 2018 |
+--------------------------------------+------------+
 
 
 
+-------------------------------------------------------------------------------------------
--------------------------------------------------------+
 
|   Overview:   Overview: PAF with dialysis in the past and                                 
                                                        |
| recurrent post op MV/CABG surgery. Last Assessment & Plan:                                
                                                        |
| Remains in NSRNormal atrial size by echocardiogram. Plan:Continue                         
                                                        |
|  Coreg with up-titration as toleratedAmiodarone 400 mg BID while                          
                                                        |
| inpatient and then transition to 200 mg BID for 2 weeks and then                          
                                                        |
| 200 mg daily. Continue po 1-3 months post discharge. Continue                             
                                                        |
| warfarin for CVA prophylaxis, INR 2.3 today                                               
                                                        |
|Plan:                                                                                      
                                                       |
|Continue Coreg with up-titration as tolerated                                              
                                                        |
|Amiodarone 400 mg BID while inpatient and then transition to 200 mg BID for 2 weeks and the
n 200 mg daily. Continue po 1-3 months post discharge.  |
|Continue warfarin for CVA prophylaxis, INR 2.3 today                                       
                                                        |
+-------------------------------------------------------------------------------------------
--------------------------------------------------------+
 
 
 
+--------------+------------+
| S/P CABG x 2 | 2018 |
+--------------+------------+
 
 
 
+-------------------------------------------------------------------------------------------
-----------+
|   Overview:   Overview: SVG's to OM and RCA at time of MV ring                            
           |
| Cristopher #28. POLY left.Last Assessment & Plan: 18: Mitral                            
           |
| valve repair with a 28 Cristopher annuloplasty ring; CABG x2 (SVG                           
           |
| to OM and SVG to RCA)Plan:ASA, statin, BB and ARB                                         
           |
|18: Mitral valve repair with a 28 Cristopher annuloplasty ring; CABG x2 (SVG to OM and S
VG to RCA) |
|                                                                                           
           |
|Plan:                                                                                      
          |
|ASA, statin, BB and ARB                                                                    
           |
+-------------------------------------------------------------------------------------------
-----------+
 
 
 
+-------------------------+------------+
| S/P mitral valve repair | 2018 |
+-------------------------+------------+
 
 
 
 
+----------------------------------------------------------------------------------------+
|   Overview:   Overview: Cristopher ring 2.16.18 ([place]) for severe                       |
| MR. POLY left. CABG too, SVG x 2 to OM and RCA.Last Assessment &                        |
| Plan: Cristopher ring 2.16.18 ([place]) for severe MR. POLY left. CABG                      |
|  too, SVG x 2 to OM and RCA.                                                           |
|Cristopher ring 2.16.18 ([place]) for severe MR. POLY left. CABG too, SVG x 2 to OM and RCA. |
+----------------------------------------------------------------------------------------+
 
 
 
+----------------------------------+------------+
| Osteomyelitis of left foot (HCC) | 2017 |
+----------------------------------+------------+
 
 
 
+-------------------------------------------------------------------+
|   Overview:   Added automatically from request for surgery [id number] |
+-------------------------------------------------------------------+
 
 
 
+-----------------------------------------------------+------------+
| Foot ulcer due to DM  ([facility])                         | 2017 |
+-----------------------------------------------------+------------+
| Severe protein-calorie malnutrition (HCC)           | 2017 |
+-----------------------------------------------------+------------+
| Loss of weight                                      | 2017 |
+-----------------------------------------------------+------------+
| Dysphagia, unspecified                              | 2017 |
+-----------------------------------------------------+------------+
| Diabetic foot ulcer (HCC)                           | 2017 |
+-----------------------------------------------------+------------+
| Dyslipidemia                                        | 2017 |
+-----------------------------------------------------+------------+
| Gastroesophageal reflux disease without esophagitis | 2017 |
+-----------------------------------------------------+------------+
| Hypertension, essential                             | 2016 |
+-----------------------------------------------------+------------+
 
 
 
+----------------------------------------------------+
|   Overview:   Last Assessment & Plan:              |
| Will resume metoprolol and losartan as BP allows.  |
+----------------------------------------------------+
 
 
 
+--------------------------------------+------------+
| ESRD (end stage renal disease) ([facility]) | 2016 |
+--------------------------------------+------------+
 
 
 
+-------------------------------------------------------------------+
|   Overview:   Primary nephrologist is Dr. Asher. She was on PD    |
| and eventually converted to HD and is dialyzed TTS using left UE  |
 
| AVF at [facility].                                          |
+-------------------------------------------------------------------+
 
 
 
+------------------------------------------------------------------+------------+
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
| dialysis, with long-term current use of insulin (HCC)            |            |
+------------------------------------------------------------------+------------+
| Anemia in ESRD (end-stage renal disease) (HCC)                   | 2016 |
+------------------------------------------------------------------+------------+
| Proteinuria                                                      | 2011 |
+------------------------------------------------------------------+------------+
| Vitamin D deficiency                                             | 2011 |
+------------------------------------------------------------------+------------+
| Secondary hyperparathyroidism (HCC)                              | 2011 |
+------------------------------------------------------------------+------------+
| Recurrent UTI                                                    | 2011 |
+------------------------------------------------------------------+------------+
| Coronary artery disease involving native coronary artery of      | 2011 |
| native heart without angina pectoris                             |            |
+------------------------------------------------------------------+------------+
 
 
 
+-------------------------------------------------------------------+
|   Overview:   Overview: [facility] on 2012 shows totally occluded   |
| right coronary artery with collateral filling from the left, no   |
| significant stenoses within the left anterior descending artery   |
| and circumflex artery. Patent stents within the left anterior     |
| descending artery, normal intracardiac pressure, mild narrowing   |
| within the distal aorta and iliac arteries. - Sherie Bartholomew,   |
| MDEchocardiogram on 2017 shows Study: a 2-dimensional        |
| transthoracic echocardiogram with m-mode, spectral and color flow |
|  Doppler was performed, left ventricular systolic function is     |
| low-normal with, an EF between 50 - 55 %, the left ventricle      |
| cavity size is normal, the RV is normal in size and function, the |
|  left atrium is normal in size, the right atrium is normal in     |
| size, aortic valve is trileaflet and is mildly thickened, there   |
| is mild aortic regurgitation, there is no evidence of aortic      |
| stenosis, the mitral valve is normal. - OLIVA Huffman |
|  on 2017 shows severe triple-vessel coronary artery disease   |
| with moderate ostial left anterior descending artery and patent   |
| stent in the midsegment, moderate stenosis in the second obtuse   |
| marginal branch and total occlusion of the proximal right         |
| coronary artery, which is known from before, given the patient's  |
| symptoms at this time, recommendation given. The patient          |
| understands. We will proceed with further medical management with |
|  blood pressure control. Also, we will optimize our blood         |
| pressure management. Further evaluation for the ostial left       |
| anterior descending artery and circumflex lesion upon recurrent   |
| symptoms for the patient, the patient will be evaluated for any   |
| further symptoms and fractional flow reserve of the left anterior |
|  descending artery and possible intervention on the left          |
| circumflex system will be considered. - OLIVA Diane   |
| on 2017 shows severe 3-vessel coronary artery disease with   |
| moderate to severe proximal left anterior descending with         |
| significant fractional flow reserve value of 0.77, severe mid     |
| large marginal branch, and chronically occluded right coronary    |
| artery with left-to-right collaterals, normal left ventricular    |
 
| end-diastolic pressure. - OLIVA Lambert on 2017     |
| shows three-vessel coronary artery disease with a chronically     |
| occluded right coronary artery with left-to-right collaterals,    |
| severe proximal left anterior descending artery with a patent     |
| stent in the mid left anterior descending artery and a            |
| significant FFR value of 0.77 during diagnostic angiogram on      |
| 2017, and severe disease of the second marginal branch |
|  of the left circumflex artery, successful PTCA and stenting of   |
| the second marginal branch of the left circumflex artery using a  |
| 2.25 x 16 and a 2.25 x 8 mm overlapping synergy drug-eluting      |
| stents postdilated using a 2.25 noncompliant balloon, successful  |
| direct stenting of the proximal and ostium of the left anterior   |
| descending artery using a 3.5 x 16 mm Synergy drug-eluting stent  |
| postdilated using a 3.5 noncompliant balloon. - Marcos Gamboa, |
|  MDEchocardiogram on 2018 shows the left ventricle is normal |
|  in size, wall thickness and moderately impaired systolic         |
| function EF 35-40%. Inferior, inferolateral and anterolateral     |
| hypokinesis, the right ventricle is normal in size and function,  |
| severe mitral regurgitation with moderately dilated left atrium,  |
| mild tricuspid regurgitation and mild pulmonary hypertension RVSP |
|  48 mmHg, there is no pericardial effusion, new wall motion       |
| abnormalities and new severe mitral regurgitation compared to     |
| prior study. - Celestino Porras [facility] on 2018 shows         |
| three-vessel coronary artery disease with widely patent stent in  |
| the left anterior descending artery and severe stent restenosis   |
| in the marginal branch, occluded right coronary artery with       |
| collateral from left to right, severe left ventricular            |
| dysfunction with severe mitral regurgitation, status post         |
| percutaneous transluminal coronary angioplasty of in-stent        |
| restenosis within the marginal branch, recommend consideration    |
| for mitral valve replacement and 2-vessel coronary artery bypass  |
| surgery to the right coronary artery and marginal branch. - Iyad  |
| MD DustinTEE and CABG x2 on 2018 shows Severely reduced LV  |
| systolic function. Visually estimated LVEF 25%, normal LV size    |
| and shape. Normal wall thickness, normal RV size with normal      |
| function, LA enlarged. POLY normal size without SEC, masses, or    |
| thrombi. IAS intact, no PFO detected, mitral valve with bileaflet |
|  thickening and severely restricted motion, associated with       |
| ventricular tethering. Severe functional MR with central jet,     |
| trileaflet aortic valve with mild sclerosis. No significant       |
| aortic stenosis. Mild AI with central jet, tricuspid valve normal |
|  structure and function. Trace TR, pulmonic valve normal Trace    |
| AK, visible portions of ascending aorta and arch normal. Grade II |
|  atherosclerotic disease of descending aorta, pulmonary artery    |
| normal, normal pericardium. No pericardial or pleural effusions,  |
| left ventricular function slightly improved and right ventricular |
|  function unchanged compared to preop,  28 mm mitral valve        |
| annuloplasty ring is well-seated with an acceptable inflow        |
| gradient across the mitral valve and no MR noted, no change in    |
| the aortic, tricuspid, or pulmonic valves compared to preop, no   |
| change in visible portions of aorta after decannulation, LV and   |
| RV function unchanged after sternal closure. - Jagjit Hickey,       |
| MDEchocardiogram on 3/7/2018 shows a complete two-dimensional     |
| transthoracic echocardiogram was performed (2D, M-mode, Doppler   |
| and color flow Doppler), left ventricular systolic function is    |
| severely reduced, the visually estimated LV ejection fraction is  |
| 15%, the left and right atrial chambers are both normal in size,  |
| there is mild mitral regurgitation, an annuloplasty ring is noted |
|  in the mitral position, there is mild tricuspid regurgitation,   |
| Estimated PA pressure is 42 mmHg, the aortic valve leaflets       |
 
| appear mildly calcified, the aortic valve opens well, the aortic  |
| valve mean systolic gradient was measured at 9 mm Hg. - Star    |
| Missy Pino Assessment & Plan: GE[facility]GI in [facility] 5 months     |
| ago. 1 week off Plavix for CAGG/MVr surgery 2.16. Now and in the  |
| past with PAFAlso statin - data in dialysis patients for primary  |
| prevention with statin is not beneficial but this is secondary    |
| prevention. However, instead of high dose statin, moderate dose   |
| due to ESRD with slight increased risk of rhabdo. - Warfarin -    |
| ASA - Moderate dose Statin                                        |
+-------------------------------------------------------------------+
 
 
 
+------------+------------+
| Depression | 2011 |
+------------+------------+
 
 
 
 Resolved Problems
 
 
+----------------------------------------------------------------+----------+-----------+
| Problem                                                        | Noted    | Resolved  |
|                                                                | Date     | Date      |
+----------------------------------------------------------------+----------+-----------+
| Chest pain                                                     | 20 |  |
|                                                                | 19       | 9         |
+----------------------------------------------------------------+----------+-----------+
| Chest pain                                                     | 20 |  |
|                                                                | 18       | 8         |
+----------------------------------------------------------------+----------+-----------+
| Severe mitral regurgitation                                    | 20 |  |
|                                                                | 18       | 8         |
+----------------------------------------------------------------+----------+-----------+
| Precordial pain                                                | 20 |  |
|                                                                | 18       | 8         |
+----------------------------------------------------------------+----------+-----------+
| NSTEMI (non-ST elevated myocardial infarction) (HCC)           | 20 |  |
|                                                                | 17       | 8         |
+----------------------------------------------------------------+----------+-----------+
| Nausea and vomiting                                            | 20 |  |
|                                                                | 17       | 7         |
+----------------------------------------------------------------+----------+-----------+
| Abdominal pain, left upper quadrant                            | 20 |  |
|                                                                | 17       | 7         |
+----------------------------------------------------------------+----------+-----------+
| Partial small bowel obstruction (HCC)                          | 20 |  |
|                                                                | 17       | 7         |
+----------------------------------------------------------------+----------+-----------+
| Gastroenteritis                                                | 20 |  |
|                                                                | 17       | 7         |
+----------------------------------------------------------------+----------+-----------+
| Transient alteration of awareness                              | 20 |  |
|                                                                | 17       | 7         |
+----------------------------------------------------------------+----------+-----------+
| Pain of left hip joint                                         | 20 |  |
|                                                                | 17       | 7         |
+----------------------------------------------------------------+----------+-----------+
| Prolonged Q-T interval on ECG                                  | 20 |  |
 
|                                                                | 17       | 7         |
+----------------------------------------------------------------+----------+-----------+
| Hyperphosphatemia                                              | 20 |  |
|                                                                | 16       | 7         |
+----------------------------------------------------------------+----------+-----------+
| Fracture of left hip due to osteoporosis (HCC)                 | 20 |  |
|                                                                | 16       | 7         |
+----------------------------------------------------------------+----------+-----------+
| Closed fracture of base of fifth metatarsal bone of right foot | 20 |  |
|                                                                | 16       | 7         |
+----------------------------------------------------------------+----------+-----------+
| ESRD on peritoneal dialysis                                    | 20 |  |
|                                                                | 16       | 7         |
+----------------------------------------------------------------+----------+-----------+
| Acute abdominal pain                                           | 20 |  |
|                                                                | 16       | 7         |
+----------------------------------------------------------------+----------+-----------+
| Blood per rectum                                               | 20 |  |
|                                                                | 16       | 7         |
+----------------------------------------------------------------+----------+-----------+
| CKD (chronic kidney disease), stage V                          | 20 |  |
|                                                                | 11       | 6         |
+----------------------------------------------------------------+----------+-----------+
| Chronic diarrhea                                               | 20 |  |
|                                                                | 11       | 7         |
+----------------------------------------------------------------+----------+-----------+
 
 
 
+-------------------------+
|   Overview:   Resolved. |
+-------------------------+
 
 
 
+--------------------+----------+-----------+
| Stool incontinence | 20 |  |
|                    | 11       | 8         |
+--------------------+----------+-----------+
 
 
 
+-------------------------+
|   Overview:   Resolved. |
+-------------------------+
 
 
 
 Encounters
 
 
+--------+-------------+-----------+----------------------+----------------------+
| Date   | Type        | Specialty | Care Team            | Description          |
+--------+-------------+-----------+----------------------+----------------------+
| / | Documentati |           |   See, Medical       |                      |
| 2019   | on Only     |           | Record               |                      |
+--------+-------------+-----------+----------------------+----------------------+
| / | Documentati |           |   Luisa Segovia   | Paperwork ([facility]   |
2019   | on Only     |           | [facility]                | [facility]  |
|        |             |           |                      | & Orthotics)         |
 
+--------+-------------+-----------+----------------------+----------------------+
| 04/10/ | Documentati |           |   João Asher MD  | Other (   | on Only     |           |                      [facility] |
|        |             |           |                      |  Dialysis Rounding   |
|        |             |           |                      | Note)                |
+--------+-------------+-----------+----------------------+----------------------+
| 04/10/ | Telephone   |           |   Srinivasan Abdi,  | Follow-up            |
2019   |             |           | DPM                  | (reschedule 04/10 )  |
+--------+-------------+-----------+----------------------+----------------------+
| 04/10/ | Orders Only |           |   Luisa Segovia   |                      |
2019   |             |           | CHELSEY TAN                |                      |
+--------+-------------+-----------+----------------------+----------------------+
| / | Telephone   |           |   Srinivasan Abdi,  | Other (Requesting    |
|    |             |           | DPM                  | status on form for   |
|        |             |           |                      | shoe order)          |
+--------+-------------+-----------+----------------------+----------------------+
| / | Documentati |           |   João Asher MD  | Other (   | on Only     |           |                      | [facility] |
|        |             |           |                      |  Dialysis Rounding   |
|        |             |           |                      | Note)                |
+--------+-------------+-----------+----------------------+----------------------+
| / | Office      |           |   Srinivasan Abdi,  | Closed nondisplaced  |
2019   | Visit       |           | DPM                  | fracture of distal   |
|        |             |           |                      | phalanx of right     |
|        |             |           |                      | great toe with       |
|        |             |           |                      | routine healing,     |
|        |             |           |                      | subsequent encounter |
|        |             |           |                      |  (Primary Dx);       |
|        |             |           |                      | Post-operative       |
|        |             |           |                      | state; Open wound of |
|        |             |           |                      |  toe, subsequent     |
|        |             |           |                      | encounter            |
+--------+-------------+-----------+----------------------+----------------------+
| / | Documentati |           |   Peabody, Jessica   | Akin Jeong        |
|    | on Only     |           | NAHUN SPARKS                | medical records      |
|        |             |           |                      | request)             |
+--------+-------------+-----------+----------------------+----------------------+
| / | Office      |           |   Kirt Mclaughlin MD     | Steal syndrome as    |
|    | Visit       |           |                      | complication of      |
|        |             |           |                      | dialysis access,     |
|        |             |           |                      | sequela (Primary     |
|        |             |           |                      | Dx); ESRD (end stage |
|        |             |           |                      |  renal disease)      |
|        |             |           |                      | (HCC); PAD           |
|        |             |           |                      | (peripheral artery   |
|        |             |           |                      | disease) ([facility])       |
+--------+-------------+-----------+----------------------+----------------------+
| / | Orders Only |           |   Dionne Danielson MA  |                      |
|    |             |           |                      |                      |
+--------+-------------+-----------+----------------------+----------------------+
| / | Office      |           |   Srinivasan Abdi,  | Closed nondisplaced  |
|    | Visit       |           | DPM                  | fracture of distal   |
|        |             |           |                      | phalanx of right     |
|        |             |           |                      | great toe with       |
|        |             |           |                      | routine healing,     |
|        |             |           |                      | subsequent encounter |
|        |             |           |                      |  (Primary Dx);       |
|        |             |           |                      | Post-operative       |
|        |             |           |                      | state; Toe pain,     |
|        |             |           |                      | right                |
 
+--------+-------------+-----------+----------------------+----------------------+
| 02/15/ | Documentati |           |   João Asher MD  | Other (January       |
2019   | on Only     |           |                      | [facility] |
|        |             |           |                      |  Dialysis Rounding   |
|        |             |           |                      | Note)                |
+--------+-------------+-----------+----------------------+----------------------+
| / | Emergency   |           |   Cookson, Rachel M, | Fall in home,        |
| 2019 - |             |           |  DO Raya,       | initial encounter    |
|        |             |           | MD Ginny            | (Primary Dx);        |
| 02/15/ |             |           | Baltazar Isidro,   | Cellulitis of right  |
|    |             |           | MD                   | toe; Closed          |
|        |             |           |                      | displaced fracture   |
|        |             |           |                      | of distal phalanx of |
|        |             |           |                      |  right great toe,    |
|        |             |           |                      | initial encounter;   |
|        |             |           |                      | Generalized          |
|        |             |           |                      | weakness; Fatigue,   |
|        |             |           |                      | unspecified type     |
+--------+-------------+-----------+----------------------+----------------------+
 
 
 
+---+-------------+
|   |   Discharge |
|   |  Summaries  |
|   | -           |
|   | Sanna |
|   |  MD Baltazar  |
|   | -           |
|   | 02/15/2019  |
|   |  2:09 PM    |
|   | PST         |
|   | Formatting  |
|   | of this     |
|   | note may be |
|   |  different  |
|   | from the    |
|   | original.Pa |
|   | tient:      |
|   | Cherie       |
|   | Cinthya     |
|   | Wilfredo      |
|   |     :    |
|   | 1950    |
|   |   MRN:      |
|   | [id number] |
|   | te of       |
|   | Admission:  |
|   |  2019  |
|   |  Date of    |
|   | Discharge:  |
|   |  2/15/2019  |
|   |   Treatment |
|   |  Team:      |
|   | Consulting  |
|   | Physician:  |
|   | Srinivasan L     |
|   | Abdi,    |
|   | DPMConsulti |
|   | ng          |
 
|   | Physician:  |
|   | Andrés Montes De Ocaa, |
|   |             |
|   | MDConsultin |
|   | g           |
|   | Physician:  |
|   | João H      |
|   | Akoum,      |
|   | MDAdmitting |
|   |  Provider:  |
|   | Tuhin       |
|   | Dorota,   |
|   | MDDischargi |
|   | ng          |
|   | Provider:   |
|   | BALTAZAR       |
|   | Renetta ISIDRO
|   |             |
|   | MDDischarge |
|   |  Diagnoses: |
|   |  Principal  |
|   | Problem:    |
|   | Falls       |
|   | frequentlyA |
|   | ctive       |
|   | Problems:   |
|   | Depression  |
|   |  ESRD (end  |
|   | stage renal |
|   |  disease)   |
|   | (HCC)  Type |
|   |  2 diabetes |
|   |  mellitus   |
|   | with        |
|   | chronic     |
|   | kidney      |
|   | disease on  |
|   | chronic     |
|   | dialysis,   |
|   | with        |
|   | long-term   |
|   | current use |
|   |  of insulin |
|   |  (HCC)      |
|   | Anemia in   |
|   | ESRD        |
|   | (end-stage  |
|   | renal       |
|   | disease)    |
|   | (HCC)       |
|   | Hypertensio |
|   | n,          |
|   | essential   |
|   | Dyslipidemi |
|   | a           |
|   | Gastroesoph |
|   | ageal       |
|   | reflux      |
|   | disease     |
|   | without     |
 
|   | esophagitis |
|   |   Loss of   |
|   | weight      |
|   | Severe      |
|   | protein-karen |
|   | orie        |
|   | malnutritio |
|   | n (HCC)     |
|   | Chronic     |
|   | systolic    |
|   | congestive  |
|   | heart       |
|   | failure     |
|   | (HCC)  COPD |
|   |  (chronic   |
|   | obstructive |
|   |  pulmonary  |
|   | disease)    |
|   | (HCC)  ICD  |
|   | (implantabl |
|   | e           |
|   | cardioverte |
|   | r-defibrill |
|   | ator) in    |
|   | place       |
|   | Paroxysmal  |
|   | atrial      |
|   | fibrillatio |
|   | n (HCC)     |
|   | S/P CABG x  |
|   | 2  S/P      |
|   | mitral      |
|   | valve       |
|   | repair  PVD |
|   |             |
|   | (peripheral |
|   |  vascular   |
|   | disease)    |
|   | (HCC)  CAD  |
|   | (coronary   |
|   | artery      |
|   | disease)    |
|   | Macrocytosi |
|   | s  Lactic   |
|   | acidosis    |
|   | Restless    |
|   | legs        |
|   | syndrome    |
|   | Overactive  |
|   | bladderReso |
|   | lved        |
|   | Problems:   |
|   | * No        |
|   | resolved    |
|   | hospital    |
|   | problems. * |
|   |             |
|   | Procedures  |
|   | Performed:C |
|   | hief        |
 
|   | Complaint:R |
|   | eferral     |
|   | (Dr. Elliott)  |
|   | and Skin    |
|   | Complaint   |
|   | (right foot |
|   |  )Hospital  |
|   | Course:     |
|   | Frequent    |
|   | falls:      |
|   | Assessment: |
|   |   It was    |
|   | unclear     |
|   | initially   |
|   | The exact   |
|   | culprit and |
|   |  it was     |
|   | felt        |
|   | potentially |
|   |  she was a  |
|   | bit dry (as |
|   |  per        |
|   | nephrology  |
|   | who saw her |
|   |  this       |
|   | admission)  |
|   | as she had  |
|   | just had    |
|   | ultrafiltra |
|   | tion with   |
|   | HD, versus  |
|   | other       |
|   | culprit.    |
|   | However the |
|   |  patient    |
|   | did state   |
|   | that when   |
|   | she has     |
|   | recently    |
|   | fallen it   |
|   | was in the  |
|   | setting of  |
|   | not using   |
|   | her walker  |
|   | as she had  |
|   | been        |
|   | instructed  |
|   | to as she   |
|   | was only    |
|   | going a     |
|   | very short  |
|   | distance    |
|   | and felt    |
|   | she did not |
|   |  need it.   |
|   | She will be |
|   |  very       |
|   | diligent    |
|   | about using |
|   |  her walker |
 
|   |  and taking |
|   |  all needed |
|   |             |
|   | precautions |
|   | .  Coronary |
|   |  artery     |
|   | disease     |
|   | with        |
|   | history of  |
|   | CABG,       |
|   | peripheral  |
|   | vascular    |
|   | disease,    |
|   | hypertensio |
|   | n and       |
|   | hyperlipide |
|   | mark with    |
|   | history of  |
|   | paroxysmal  |
|   | atrial      |
|   | fibrillatio |
|   | n on        |
|   | anticoagula |
|   | tion:.      |
|   | Will resume |
|   |  PTA meds   |
|   | as below on |
|   |  d/c        |
|   | Overactive  |
|   | bladder:    |
|   | Continue    |
|   | oxybutynin. |
|   |   Requip    |
|   | for         |
|   | restless    |
|   | leg         |
|   | syndrome.   |
|   | Anxiety     |
|   | depression: |
|   |             |
|   | recommended |
|   |  to Refrain |
|   |  from using |
|   |             |
|   | benzodiazep |
|   | inesas much |
|   |  as         |
|   | possible    |
|   | and per d/w |
|   |  nephrology |
|   |  will       |
|   | decrease    |
|   | the ativan  |
|   | dose.       |
|   | Diabetes    |
|   | mellitus    |
|   | with        |
|   | diabetic    |
|   | nephropathy |
|   |  with       |
 
|   | end-stage   |
|   | renal       |
|   | disease on  |
|   | hemodialysi |
|   | s: Continue |
|   |  with       |
|   | current     |
|   | regimen for |
|   |  now,       |
|   | follow, and |
|   |  adjust as  |
|   | needed      |
|   | based on    |
|   | progress.   |
|   | F/u with    |
|   | outpatient  |
|   | providers   |
|   | With        |
|   | respect to  |
|   | her left    |
|   | foot prior  |
|   | injury and  |
|   | prior       |
|   | antibiotics |
|   | :           |
|   | Assessment: |
|   |   She was   |
|   | seen by ID  |
|   | here and    |
|   | recently    |
|   | had         |
|   | completed a |
|   |  course of  |
|   | antbx       |
|   | reportedly. |
|   |   They      |
|   | would like  |
|   | to have her |
|   |  off antbx  |
|   | for now and |
|   |  f/u        |
|   | withthem.   |
|   | Should f/u  |
|   | with her    |
|   | podiatrist  |
|   | dr abdi  |
|   | for her     |
|   | foot as     |
|   | well.       |
|   | Discharge   |
|   | Exam and    |
|   | Data:Vital  |
|   | Signs:BP    |
|   | 112/57 (BP  |
|   | Location:   |
|   | Right upper |
|   |  arm)  |    |
|   | Pulse 66  | |
|   |  Temp 98    |
|   |   F (36.7   |
 
|   |   C) (Oral) |
|   |   | Resp 18 |
|   |   | Ht      |
|   | 1.778 m (5' |
|   |  10")  | Wt |
|   |  65.3 kg    |
|   | (144 lb)  | |
|   |  SpO2 96%   |
|   | |           |
|   | Breastfeedi |
|   | ng? No  |   |
|   | BMI 20.66   |
|   | kg/m   I&O  |
|   | Last 3      |
|   | Shifts:  No |
|   |             |
|   | intake/outp |
|   | ut data     |
|   | recorded.Ph |
|   | ysical      |
|   | Examination |
|   | :           |
|   | Constitutio |
|   | nal: Alert  |
|   | and         |
|   | oriented to |
|   |  person,    |
|   | place, and  |
|   | time.       |
|   | Appears     |
|   | well-develo |
|   | ped and     |
|   | well-nouris |
|   | hed. HEENT: |
|   |  Neck       |
|   | supple, no  |
|   | JVD, non    |
|   | icteric     |
|   | sclera.Card |
|   | iovascular: |
|   |  Normal     |
|   | rate,       |
|   | regular     |
|   | rhythm,     |
|   | normal      |
|   | heart       |
|   | sounds, and |
|   |  intact     |
|   | distal      |
|   | pulses.     |
|   | Exam        |
|   | reveals no  |
|   | appreciated |
|   |  gallop or  |
|   | friction    |
|   | rub.  No    |
|   | murmur      |
|   | heard.Pulmo |
|   | nary/Chest: |
|   |  Effort     |
 
|   | normal and  |
|   | breath      |
|   | sounds      |
|   | normal. No  |
|   | stridor. No |
|   |             |
|   | respiratory |
|   |  distress.  |
|   |  no         |
|   | wheezes. no |
|   |  rales.     |
|   | exhibits no |
|   |             |
|   | tenderness. |
|   |  Abdominal: |
|   |  Soft.      |
|   | Bowel       |
|   | sounds are  |
|   | normal.     |
|   | exhibits no |
|   |             |
|   | distension. |
|   |  There is   |
|   | no          |
|   | tenderness. |
|   |  There is   |
|   | no rebound  |
|   | and no      |
|   | guarding.   |
|   | Extremeties |
|   | /Musculoske |
|   | letal:      |
|   | Normal      |
|   | general     |
|   | range of    |
|   | motion.exhi |
|   | bits no     |
|   | tenderness. |
|   |   exhibits  |
|   | no edema.   |
|   | Left foot   |
|   | in boot     |
|   | wrapped in  |
|   | dressing,   |
|   | see wound   |
|   | care and ID |
|   |  eval .     |
|   | Neurologica |
|   | l:  Alert   |
|   | and         |
|   | oriented to |
|   |  person,    |
|   | place, and  |
|   | time. Has   |
|   | normal      |
|   | reflexes.   |
|   | No gross    |
|   | cranial     |
|   | nerve or    |
|   | focal       |
 
|   | deficit.    |
|   | Exhibits    |
|   | normal      |
|   | muscle      |
|   | tone.       |
|   | Coordinatio |
|   | n           |
|   | normal.Skin |
|   | : Skin is   |
|   | warm and    |
|   | dry. No     |
|   | rash noted. |
|   |  No         |
|   | erythema.   |
|   | No pallor.  |
|   | Psychiatric |
|   | : Has a     |
|   | normal mood |
|   |  and affect |
|   |  given      |
|   | situation.  |
|   | Behavior is |
|   |  normal.    |
|   | Judgment    |
|   | normal for  |
|   | patient.    |
|   | Recent Labs |
|   |  Recent     |
|   | LabsLab     |
|   |  |
|   | 9 WBC 5.14  |
|   | HGB 10.1*   |
|   | HCT 30.9*   |
|   | *    |
|   | Recent      |
|   | LabsLab     |
|   |  |
|   | 9  K  |
|   | 4.7 CL 96*  |
|   | CO2 >40*    |
|   | BUN 9       |
|   | CREATININE  |
|   | 2.96* No    |
|   | results for |
|   |  input(s):  |
|   | INR in the  |
|   | last 168    |
|   | hours.Resul |
|   | ts          |
|   | Procedure   |
|   | Component   |
|   | Value Units |
|   |  Date/Time  |
|   |  Blood      |
|   | Culture Set |
|   |  2          |
|   | [[id number]]  |
|   | Collected:  |
|   |  19   |
|   | 1739        |
 
|   | Specimen:   |
|   | Blood from  |
|   | Blood,      |
|   | peripheral  |
|   | draw        |
|   | Updated:    |
|   | 19    |
|   | 2005  Blood |
|   |  Culture    |
|   | Set 1       |
|   | [[id number]]  |
|   | Collected:  |
|   |  19   |
|   | 1655        |
|   | Specimen:   |
|   | Blood from  |
|   | Blood Line  |
|   | Draw        |
|   | Updated:    |
|   | 19    |
|   | 2004        |
|   | Recent      |
|   | Radiology   |
|   | ResultsXr   |
|   | Toe Right 2 |
|   |  ViewResult |
|   |  Date:      |
|   | 2019No |
|   |             |
|   | radiographi |
|   | c evidence  |
|   | of          |
|   | osteomyelit |
|   | is seen.    |
|   | If further  |
|   | assessment  |
|   | is          |
|   | warranted,  |
|   | consider    |
|   | correlation |
|   |  with MRI.  |
|   | Potential   |
|   | acute       |
|   | fracture    |
|   | through the |
|   |  base of    |
|   | the distal  |
|   | phalanx.    |
|   | Signed by:  |
|   | Habbu, Gavin |
|   |  Sign       |
|   | Date/Time:  |
|   | 2019  |
|   | 5:15        |
|   | PMOutstandi |
|   | ng Issues:  |
|   |  F/u with   |
|   | podiatry,   |
|   | ID, PCP and |
|   |  resume HD. |
 
|   |   Discharge |
|   |             |
|   | Information |
|   | :Follow     |
|   | up:[facility]   |
|   | [address],      |
|   |  [address]     |
|   | [address],   |
|   | [place]         |
|   | [address] |
|   | [place]        |
|   | [id number] |
|   | [phone] |
|   | ule an      |
|   | appointment |
|   |  as soon as |
|   |  possible   |
|   | for a       |
|   | visitBrian  |
|   | L Abdi,  |
|   | [id number]      |
|   | [address], |
|   |  [place]        |
|   | [address] |
|   |  [place]         |
|   | [id number] |
|   | [phone] |
|   | ule an      |
|   | appointment |
|   |  as soon as |
|   |  possible   |
|   | for a       |
|   | visitplease |
|   |  continue   |
|   | prior to    |
|   | admission   |
|   | planJimmy   |
|   | [place],       |
|   | [id number]    |
|   | [address]  |
|   | [address] |
|   | [place]       |
|   | [id number] |
|   | [phone] |
|   | lease call  |
|   | to see when |
|   |  they would |
|   |  like to    |
|   | see you in  |
|   | follow up   |
|   | resume care |
|   |  with all   |
|   | providers   |
|   | you were    |
|   | seeing      |
|   | prior to    |
|   | admission   |
|   | Medication  |
|   | List        |
|   | CHANGE how  |
 
|   | you take    |
|   | these       |
|   | medications |
|   |   LORazepam |
|   |  1 MG       |
|   | tabletRefil |
|   | ls:         |
|   | 0Commonly   |
|   | known as:   |
|   | ATIVANTake  |
|   | 0.5 tablets |
|   |  by mouth   |
|   | every 8     |
|   | (eight)     |
|   | hours as    |
|   | needed for  |
|   | Anxiety.    |
|   | Patient     |
|   | states she  |
|   | has the 1mg |
|   |  tablets at |
|   |  home and   |
|   | that they   |
|   | are scored  |
|   | and she     |
|   | will split  |
|   | them in     |
|   | halfWhat    |
|   | changed:    |
|   | how much to |
|   |  take       |
|   | additional  |
|   | instruction |
|   | s  CONTINUE |
|   |  taking     |
|   | these       |
|   | medications |
|   |   albuterol |
|   |  108 (90    |
|   | Base)       |
|   | MCG/ACT     |
|   | inhalerRefi |
|   | lls:        |
|   | 0Commonly   |
|   | known as:   |
|   | PROVENTIL   |
|   | HFA;VENTOLI |
|   | N HFA       |
|   | apixaban    |
|   | 2.5 MG      |
|   | tabletQTY:  |
|   |  60         |
|   | tabletRefil |
|   | ls:         |
|   | 0Commonly   |
|   | known as:   |
|   | ELIQUISTake |
|   |  1 tablet   |
|   | by mouth 2  |
|   | (two) times |
 
|   |  daily.     |
|   | atorvastati |
|   | n 80 MG     |
|   | tabletRefil |
|   | ls:         |
|   | 0Commonly   |
|   | known as:   |
|   | LIPITOR     |
|   | carvedilol  |
|   | 12.5 MG     |
|   | tabletQTY:  |
|   |  60         |
|   | tabletRefil |
|   | ls:         |
|   | 0Doctor's   |
|   | comments:   |
|   | Hold for    |
|   | SBP less    |
|   | than        |
|   | 100Hold for |
|   |  HR less    |
|   | than        |
|   | 60Commonly  |
|   | known as:   |
|   | COREGTake 1 |
|   |  tablet by  |
|   | mouth 2     |
|   | (two) times |
|   |  daily with |
|   |  meals.     |
|   | esomeprazol |
|   | e 40 MG     |
|   | capsuleRefi |
|   | lls:        |
|   | 0Commonly   |
|   | known as:   |
|   | NEXIUM      |
|   | HYDROcodone |
|   | -acetaminop |
|   | hen 5-325   |
|   | MG per      |
|   | tabletQTY:  |
|   |  30         |
|   | tabletRefil |
|   | ls:         |
|   | 0Commonly   |
|   | known as:   |
|   | NORCOTake 1 |
|   |  tablet by  |
|   | mouth every |
|   |  4 (four)   |
|   | hours as    |
|   | needed.     |
|   | insulin     |
|   | aspart 100  |
|   | UNIT/ML     |
|   | injectionRe |
|   | fills:      |
|   | 0Commonly   |
|   | known as:   |
 
|   | NOVOLOG     |
|   | insulin     |
|   | degludec    |
|   | 100 UNIT/ML |
|   |             |
|   | injectionRe |
|   | fills:      |
|   | 0Commonly   |
|   | known as:   |
|   | TRESIBA     |
|   | isosorbide  |
|   | mononitrate |
|   |  60 MG 24   |
|   | hr          |
|   | tabletQTY:  |
|   |  30         |
|   | tabletRefil |
|   | ls:  1Take  |
|   | 1 tablet by |
|   |  mouth      |
|   | daily.      |
|   | losartan    |
|   | 100 MG      |
|   | tabletRefil |
|   | ls:         |
|   | 0Commonly   |
|   | known as:   |
|   | COZAAR      |
|   | nitroGLYCER |
|   | IN 0.4 MG   |
|   | SL          |
|   | tabletQTY:  |
|   |  30         |
|   | tabletRefil |
|   | ls:         |
|   | 0Doctor's   |
|   | comments:   |
|   | Hold for    |
|   | SBP less    |
|   | than        |
|   | 100Commonly |
|   |  known as:  |
|   |             |
|   | NITROSTATPl |
|   | ace 1       |
|   | tablet      |
|   | under the   |
|   | tongue      |
|   | every 5     |
|   | (five)      |
|   | minutes as  |
|   | needed for  |
|   | Chest pain. |
|   |             |
|   | nortriptyli |
|   | ne 25 MG    |
|   | capsuleRefi |
|   | lls:        |
|   | 0Commonly   |
|   | known as:   |
 
|   | PAMELOR     |
|   | ondansetron |
|   |  4 MG       |
|   | disintegrat |
|   | ing         |
|   | tabletRefil |
|   | ls:         |
|   | 0Commonly   |
|   | known as:   |
|   | ZOFRAN-ODT  |
|   | oxybutynin  |
|   | 5 MG        |
|   | tabletRefil |
|   | ls:         |
|   | 0Commonly   |
|   | known as:   |
|   | DITROPAN    |
|   | prochlorper |
|   | azine 5 MG  |
|   | tabletQTY:  |
|   |  60         |
|   | tabletRefil |
|   | ls:         |
|   | 11Doctor's  |
|   | comments:   |
|   | Please      |
|   | consider 90 |
|   |  day        |
|   | supplies to |
|   |  promote    |
|   | better      |
|   | adherenceTA |
|   | KE ONE      |
|   | TABLET BY   |
|   | MOUTH TWICE |
|   |  DAILY AS   |
|   | NEEDED FOR  |
|   | NAUSEA      |
|   | rOPINIRole  |
|   | 0.25 MG     |
|   | tabletRefil |
|   | ls:         |
|   | 0Commonly   |
|   | known as:   |
|   | REQUIP      |
|   | TRINTELLIX  |
|   | 20 MG       |
|   | TabsRefills |
|   | :  0Generic |
|   |  drug:      |
|   | Vortioxetin |
|   | e HBr  You  |
|   | might also  |
|   | be taking   |
|   | other       |
|   | medications |
|   |  not listed |
|   |  above. If  |
|   | you have    |
|   | questions   |
 
|   | about any   |
|   | of your     |
|   | other       |
|   | medications |
|   | , talk to   |
|   | the person  |
|   | who         |
|   | prescribed  |
|   | them or     |
|   | your        |
|   | Primary     |
|   | Care        |
|   | Provider.   |
|   |    Where to |
|   |  Get Your   |
|   | Medications |
|   |             |
|   | Information |
|   |  about      |
|   | where to    |
|   | get these   |
|   | medications |
|   |  is not yet |
|   |  available  |
|   |  Ask your   |
|   | nurse or    |
|   | doctor      |
|   | about these |
|   |             |
|   | medications |
|   |             |
|   | LORazepam 1 |
|   |  MG tablet  |
|   | Disposition |
|   | :           |
|   | HomeConditi |
|   | on:         |
|   | StableCode  |
|   | Status:     |
|   | Full        |
|   | CodeDischar |
|   | ge took 35  |
|   | minutes, to |
|   |  include    |
|   | final       |
|   | examination |
|   | ,           |
|   | discussion  |
|   | of          |
|   | admission,  |
|   | and         |
|   | preparation |
|   |  of         |
|   | prescriptio |
|   | ns,         |
|   | instruction |
|   | s for       |
|   | on-going    |
|   | care,       |
|   | follow-up   |
 
|   | and         |
|   | documentati |
|   | on of       |
|   | discharge   |
|   | summary.SCO |
|   | TT          |
|   | SANNA, |
|   |  MD2:09 PM  |
+---+-------------+
 
 
 
+--------+-------------+---+----------------------+----------------------+
| / | Emergency   |   |   Chau Deleon,  |                      |
|    |             |   | MD                   |                      |
+--------+-------------+---+----------------------+----------------------+
| / | Office      |   |   Kirt Mclaughlin MD     | PAD (peripheral      |
|    | Visit       |   |                      | artery disease)      |
|        |             |   |                      | ([facility]) (Primary Dx);  |
|        |             |   |                      | ESRD (end stage      |
|        |             |   |                      | renal disease) ([facility]) |
+--------+-------------+---+----------------------+----------------------+
| / | Telephone   |   |   Kristen Tam, |                      |
| 2019   |             |   |  RN                  |                      |
+--------+-------------+---+----------------------+----------------------+
| / | Telephone   |   |   Srinivasan Abdi,  | Follow-up            |
|    |             |   | DPM                  | (appointment)        |
+--------+-------------+---+----------------------+----------------------+
| / | Orders Only |   |   Luisa Segovia   |                      |
|    |             |   | CHELSEY TAN                |                      |
+--------+-------------+---+----------------------+----------------------+
| / | Hospital    |   |   Connie,        | PVD (peripheral      |
| 2019 - | Encounter   |   | MD Liat Jarrett, | vascular disease)    |
|        |             |   |  Moiz Porter MD    | ([facility]) (Primary Dx);  |
| / |             |   |                      | ESRD (end stage      |
| 2019   |             |   |                      | renal disease) on    |
|        |             |   |                      | dialysis ([facility]);      |
|        |             |   |                      | Anemia in ESRD       |
|        |             |   |                      | (end-stage renal     |
|        |             |   |                      | disease) ([facility]);      |
|        |             |   |                      | Hypoalbuminemia;     |
|        |             |   |                      | Electrolyte          |
|        |             |   |                      | imbalance risk       |
+--------+-------------+---+----------------------+----------------------+
 
 
 
+---+-------------+
|   |   Discharge |
|   |  Summaries  |
|   | - Lyn,  |
|   | Prudence,     |
|   | MD-R2 -     |
|   | 2019  |
|   |  1:04 PM    |
|   | PST         |
|   | Formatting  |
|   | of this     |
|   | note may be |
|   |  different  |
 
|   | from the    |
|   | original.[facility] |
|   | [facility]        |
|   | [facility]    |
|   [facility]     |
|   | [facility] |
|   | ce:         |
|   | Hospitalist |
|   | Resident    |
|   | Discharge   |
|   | SummaryDate |
|   |  of         |
|   | Admission:  |
|   |             |
|   | 2019Da |
|   | te of       |
|   | Discharge:  |
|   |             |
|   | [address]/ |
|   | Disc |
|   | harge       |
|   | Provider:   |
|   | Prudence      |
|   | Nicholson,    |
|   | [id number] |
|   | ent Team:   |
|   | Consulting  |
|   | Physician:  |
|   | Srinivasan L     |
|   | Abdi,    |
|   | DPMConsulti |
|   | ng          |
|   | Physician:  |
|   | Ethan      |
|   | Delmi,     |
|   | MDAdmitting |
|   |  Provider:  |
|   | Luiza      |
|   | Collingham, |
|   |             |
|   | MDDischarge |
|   |  Diagnoses: |
|   |             |
|   | Principal   |
|   | Problem:    |
|   | PVD         |
|   | (peripheral |
|   |  vascular   |
|   | disease)    |
|   | (HCC)Active |
|   |  Problems:  |
|   |  ESRD (end  |
|   | stage renal |
|   |  disease)   |
|   | (HCC)  Type |
|   |  2 diabetes |
|   |  mellitus   |
|   | with        |
|   | chronic     |
|   | kidney      |
 
|   | disease on  |
|   | chronic     |
|   | dialysis,   |
|   | with        |
|   | long-term   |
|   | current use |
|   |  of insulin |
|   |  (HCC)      |
|   | Anemia in   |
|   | ESRD        |
|   | (end-stage  |
|   | renal       |
|   | disease)    |
|   | (HCC)       |
|   | Hypertensio |
|   | n,          |
|   | essential   |
|   | Chronic     |
|   | systolic    |
|   | congestive  |
|   | heart       |
|   | failure     |
|   | (HCC)  COPD |
|   |  (chronic   |
|   | obstructive |
|   |  pulmonary  |
|   | disease)    |
|   | (HCC)       |
|   | Paroxysmal  |
|   | atrial      |
|   | fibrillatio |
|   | n (HCC)     |
|   | CAD         |
|   | (coronary   |
|   | artery      |
|   | disease)Res |
|   | olved       |
|   | Problems:   |
|   | * No        |
|   | resolved    |
|   | hospital    |
|   | problems.   |
|   | *BRIEF      |
|   | HISTORY OF  |
|   | PRESENTATIO |
|   | N:          |
|   | Patient     |
|   | Summary:    |
|   | Per Dr.     |
|   | Collingham' |
|   | s H and P   |
|   | from        |
|   | 2019:  |
|   | '69 y/o F   |
|   | with h/o    |
|   | ESRD on HD  |
|   | T,TH,Sat    |
|   | ([facility]        |
|   | [place]),     |
|   | DM2, PAD    |
 
|   | s/p         |
|   | angioplasty |
|   |  of LLE and |
|   |             |
|   | transmetata |
|   | rsal        |
|   | amputation  |
|   | of left     |
|   | foot        |
|   | 18 by |
|   |  [facility]        |
|   | Abdi due |
|   |  to         |
|   | osteomyelit |
|   | is, CAD,    |
|   | s/p MI,     |
|   | CABG, AVR   |
|   | , HFrEF |
|   |  with last  |
|   | EF 20 to    |
|   | 25%, s/p    |
|   | AICD, AF on |
|   |  chronic    |
|   | anticoagula |
|   | tion who    |
|   | was sent to |
|   |  ED by   |
|   | [facility] for    |
|   | evaluation  |
|   | of right    |
|   | LE.         |
|   |   Patient   |
|   | reports     |
|   | that at HD  |
|   | yesterday   |
|   | it was      |
|   | noted that  |
|   | her right   |
|   | toes were   |
|   | red and     |
|   | dusky.      |
|   |   She went  |
|   | to see   |
|   | [facility] this   |
|   | morning and |
|   |  he was     |
|   | concerned   |
|   | that right  |
|   | toes could  |
|   | be ischemic |
|   |  or         |
|   | infected    |
|   | and         |
|   | referred to |
|   |  ED.        |
|   | 'HOSPITAL   |
|   | COURSE:     |
|   | Vascular    |
|   | surgery was |
|   |  consulted  |
 
|   | from the    |
|   | emergency   |
|   | department, |
|   |  they did a |
|   |  selective  |
|   | catheteriza |
|   | tion of the |
|   |  left       |
|   | common      |
|   | femoral     |
|   | artery and  |
|   | placed an   |
|   | SMA stent.  |
|   | The patient |
|   |  tolerated  |
|   | the         |
|   | procedure   |
|   | well.       |
|   | Podiatry    |
|   | was         |
|   | consulted,  |
|   | they        |
|   | recommended |
|   |  wound care |
|   |             |
|   | consultatio |
|   | n for the   |
|   | open wound  |
|   | on her left |
|   |  foot. They |
|   |  also       |
|   | stated that |
|   |  her right  |
|   | foot did    |
|   | not need a  |
|   | procedure   |
|   | for the     |
|   | toes at     |
|   | this time.  |
|   | Infectious  |
|   | disease is  |
|   | waiting for |
|   |  blood      |
|   | cultures    |
|   | and Gram    |
|   | stain and   |
|   | culture of  |
|   | the wound   |
|   | site to     |
|   | narrow her  |
|   | IV          |
|   | antibiotics |
|   | . She       |
|   | reported    |
|   | improved    |
|   | pain, no    |
|   | shortness   |
|   | of breath,  |
|   | no nausea   |
|   | and         |
 
|   | vomiting,   |
|   | she is      |
|   | making a    |
|   | small       |
|   | amount of   |
|   | urine and   |
|   | had a bowel |
|   |  movement   |
|   | today. She  |
|   | would like  |
|   | to go home. |
|   |  Physical   |
|   | therapy     |
|   | cleared her |
|   |  to go home |
|   |  with home  |
|   | assist.     |
|   | Nephrology  |
|   | was         |
|   | consulted   |
|   | and         |
|   | reported    |
|   | that they   |
|   | were        |
|   | amenable to |
|   |             |
|   | administeri |
|   | ng her IV   |
|   | antibiotics |
|   |  with       |
|   | dialysis.   |
|   | Infectious  |
|   | disease was |
|   |  consulted  |
|   | and agreed  |
|   | to this     |
|   | plan. Upon  |
|   | discharge   |
|   | the patient |
|   |  was        |
|   | eating,     |
|   | drinking,   |
|   | urinating,  |
|   | having      |
|   | bowel       |
|   | movements.s |
|   | he was not  |
|   | having      |
|   | fevers,     |
|   | nausea, or  |
|   | vomiting.   |
|   | No          |
|   | prescriptio |
|   | ns prior to |
|   |  admission. |
|   |  DISCHARGE  |
|   | EXAMVital   |
|   | Signs:BP    |
|   | 120/56 (BP  |
|   | Location:   |
 
|   | Right upper |
|   |  arm)  |    |
|   | Pulse 64  | |
|   |  Temp 97.5  |
|   |   F (36.4   |
|   |   C)        |
|   | (Axillary)  |
|   |  | Resp 18  |
|   |  | Ht 1.778 |
|   |  m (5' 10") |
|   |   | Wt 65.5 |
|   |  kg (144 lb |
|   |  6.4 oz)  | |
|   |  SpO2 97%   |
|   | |           |
|   | Breastfeedi |
|   | ng? No  |   |
|   | BMI 20.72   |
|   | kg/m        |
|   | Physical    |
|   | ExamGeneral |
|   |             |
|   | Appearance: |
|   |  Alert and  |
|   | cooperative |
|   | , sitting   |
|   | up in a     |
|   | chair by    |
|   | the bed and |
|   |  appears to |
|   |  be in no   |
|   | acute       |
|   | distress.   |
|   |   HEENNT:   |
|   | Normocephal |
|   | ic and      |
|   | atraumatic. |
|   |  Hearing    |
|   | grossly     |
|   | intact. No  |
|   | nasal       |
|   | discharge.  |
|   | No tracheal |
|   |  deviation. |
|   |             |
|   |   Cardiovas |
|   | cular:      |
|   | Rhythm and  |
|   | rate are    |
|   | regular.    |
|   | 2/6         |
|   | systolic    |
|   | murmur      |
|   | heard best  |
|   | over the    |
|   | left upper  |
|   | sternal     |
|   | border. No  |
|   | gallops or  |
|   | rubs        |
 
|   | appreciated |
|   | .           |
|   | Peripheral  |
|   | pulses 2+   |
|   | on the      |
|   | right. No   |
|   | lower       |
|   | extremity   |
|   | edema.  Pul |
|   | monary/Ches |
|   | t: Lungs    |
|   | are clear   |
|   | to          |
|   | auscultatio |
|   | n           |
|   | bilaterally |
|   | . Effort is |
|   |  normal.    |
|   | Normal      |
|   | breath      |
|   | sounds.     |
|   |   Abdominal |
|   | : Soft,     |
|   | nontender,  |
|   | nondistende |
|   | d.          |
|   | Normoactive |
|   |  bowel      |
|   | sounds. No  |
|   | guarding or |
|   |  rebound    |
|   | tenderness. |
|   |             |
|   |   Musculosk |
|   | eletal:     |
|   | Normal      |
|   | range of    |
|   | motion.     |
|   | Normal      |
|   | muscular    |
|   | development |
|   | . Patient   |
|   | with        |
|   | forefoot    |
|   | amputation  |
|   | on the      |
|   | left. Left  |
|   | foot        |
|   | wrapped in  |
|   | new         |
|   | dressing,   |
|   | C/D/I.      |
|   | Right foot  |
|   | with        |
|   | erythema    |
|   | over the    |
|   | great big   |
|   | toe and     |
|   | cyanosis    |
|   | over the    |
 
|   | second toe, |
|   |  improved   |
|   | from prior. |
|   |             |
|   | Neurologica |
|   | l: CN       |
|   | II-XII      |
|   | grossly     |
|   | intact.     |
|   | Oriented to |
|   |  person,    |
|   | place, and  |
|   | time.       |
|   |   Skin:     |
|   | Skin is     |
|   | warm and    |
|   | dry. No     |
|   | rash noted. |
|   |             |
|   |   Psychiatr |
|   | ic:The      |
|   | patient was |
|   |  able to    |
|   | demonstrate |
|   |  good       |
|   | judgement   |
|   | and reason, |
|   |  without    |
|   | hallucinati |
|   | ons,        |
|   | abnormal    |
|   | affect or   |
|   | abnormal    |
|   | behaviors   |
|   | during the  |
|   | examination |
|   | .           |
|   | DATARecent  |
|   | LabsLab     |
|   |  |
|   | 8           |
|   |  |
|   | 3           |
|   |  |
|   | 3 WBC 3.90  |
|   | 3.39* 5.53  |
|   | HGB 9.8*    |
|   | 9.5* 9.4*   |
|   | HCT 30.1*   |
|   | 29.2* 28.4* |
|   |  *   |
|   | 125* 147*   |
|   | NEUTOPHILPC |
|   | T  --       |
|   | 66.95  --   |
|   | MONOPCT  -- |
|   |   11.96  -- |
|   |   Recent    |
|   | LabsLab     |
|   |  |
 
|   | 8           |
|   | 45 |
|   | 3           |
|   |  |
|   | 3     |
|   | 140 139 K   |
|   | 3.9 4.2 4.0 |
|   |  CL 97* 101 |
|   |  100 CO2 31 |
|   |  28 29 BUN  |
|   | 13 26* 23   |
|   | CREATININE  |
|   | 4.8* 6.8*   |
|   | 6.1* PROT   |
|   | --   --     |
|   | 6.2*        |
|   | BILITOT  -- |
|   |    --  0.4  |
|   | ALT  --     |
|   | --  21 AST  |
|   |  --   --    |
|   | 24          |
|   | Phosphorus: |
|   |   Lab       |
|   | Results     |
|   | Component   |
|   | Value Date  |
|   |  PHOS 3.6   |
|   | 2019  |
|   | Invalid     |
|   | input(s):   |
|   | LABALBURece |
|   | nt LabsLab  |
|   |  |
|   | 8 MG 2.3    |
|   | Recent      |
|   | LabsLab     |
|   |  |
|   | 3 CKTOTAL   |
|   | 28*         |
|   | Intake/Outp |
|   | ut Summary  |
|   | (Last 24    |
|   | hours) at   |
|   | 19    |
|   | 1717Last    |
|   | data filed  |
|   | at 19 |
|   |  0850 Gross |
|   |  per 24     |
|   | hour Intake |
|   |             |
|   |    380 ml   |
|   | Output      |
|   |             |
|   | 0 ml Net    |
|   |             |
|   | 380 ml      |
|   | Microbiolog |
|   | y: Blood    |
 
|   | cultures -  |
|   | NGTDRadiolo |
|   | gyUs Lower  |
|   | Extremty    |
|   | Arterial    |
|   | RightResult |
|   |  Date:      |
|   | . |
|   |  Right leg  |
|   | shows       |
|   | biphasic    |
|   | inflow with |
|   |  a greater  |
|   | than 50%    |
|   | stenosis at |
|   |  the origin |
|   |  of the     |
|   | superficial |
|   |  femoral    |
|   | artery      |
|   | (137-309).  |
|   |  Biphasic   |
|   | outflow. 2. |
|   |  Two vessel |
|   |  runoff to  |
|   | the right   |
|   | foot.       |
|   | Signed by:  |
|   | Iuliano,    |
|   | Edward Sign |
|   |  Date/Time: |
|   |  2019 |
|   |  3:11       |
|   | PMPLANDispo |
|   | sition:     |
|   | HomeConditi |
|   | on:         |
|   | StableCode  |
|   | Status:     |
|   | Full CodeNo |
|   |  discharge  |
|   | procedures  |
|   | on          |
|   | file.Follow |
|   |  up:[facility]  |
|   | [facility]      |
|   | V[facility]    |
|   | [id number] |
|   |  [facility]   |
|   | Dr Nhan      |
|   | [place]   |
|   | [place]  |
|   | [id number] |
|   | 9 |
|   | Follow up   |
|   | in 3        |
|   | week(s)[address] |
|   | li [address],   |
|   |  [address]     |
|   | [address],   |
 
|   | [place]         |
|   | [address] |
|   | [place]        |
|   | [id number] |
|   | [phone] |
|   | [address],    |
|   |  [address]     |
|   | [address],   |
|   | [place]         |
|   | [address] |
|   | [place]        |
|   | [id number] |
|   | [phone] 1  |
|   | weekJimmy   |
|   | [place],       |
|   | [id number]    |
|   | [address]  |
|   | [address] |
|   | [place]       |
|   | [phone] |
|   | [phone]  |
|   | office for  |
|   | appointment |
|   |  Medication |
|   |  List       |
|   | START       |
|   | taking      |
|   | these       |
|   | medications |
|   |             |
|   | cefTAZidime |
|   |  2 g        |
|   | injectionQT |
|   | Y:  1       |
|   | eachRefills |
|   | :           |
|   | 0Commonly   |
|   | known as:   |
|   | FORTAZInjec |
|   | t 2 g into  |
|   | the muscle  |
|   | After       |
|   | Hemodialysi |
|   | s (see      |
|   | admin       |
|   | instruction |
|   | s) for 7    |
|   | days.       |
|   | vancomycin  |
|   | IVPBRefills |
|   | :           |
|   | 0Commonly   |
|   | known as:   |
|   | VANCOCINInj |
|   | ect 750 mg  |
|   | into the    |
|   | vein After  |
|   | Hemodialysi |
|   | s (see      |
|   | admin       |
 
|   | instruction |
|   | s) for 7    |
|   | days.       |
|   | Dilute in   |
|   | appropriate |
|   |  volume of  |
|   | IV fluid    |
|   | and give by |
|   |  slow IV    |
|   | infusion.   |
|   | CHANGE how  |
|   | you take    |
|   | these       |
|   | medications |
|   |   losartan  |
|   | 25 MG       |
|   | tabletQTY:  |
|   |  30         |
|   | tabletRefil |
|   | ls:         |
|   | 0Commonly   |
|   | known as:   |
|   | COZAARTake  |
|   | 1 tablet by |
|   |  mouth      |
|   | daily.What  |
|   | changed:    |
|   | how much to |
|   |  take       |
|   | CONTINUE    |
|   | taking      |
|   | these       |
|   | medications |
|   |   *         |
|   | albuterol   |
|   | 108 (90     |
|   | Base)       |
|   | MCG/ACT     |
|   | inhalerRefi |
|   | lls:        |
|   | 0Commonly   |
|   | known as:   |
|   | PROVENTIL   |
|   | HFA;VENTOLI |
|   | N HFA *     |
|   | VENTOLIN    |
|   |  (90 |
|   |  Base)      |
|   | MCG/ACT     |
|   | inhalerRefi |
|   | lls:        |
|   | 0Generic    |
|   | drug:       |
|   | albuterol   |
|   | apixaban    |
|   | 2.5 MG      |
|   | tabletQTY:  |
|   |  60         |
|   | tabletRefil |
|   | ls:         |
 
|   | 0Commonly   |
|   | known as:   |
|   | ELIQUISTake |
|   |  1 tablet   |
|   | by mouth 2  |
|   | (two) times |
|   |  daily.     |
|   | atorvastati |
|   | n 40 MG     |
|   | tabletQTY:  |
|   |  30         |
|   | tabletRefil |
|   | ls:         |
|   | 0Commonly   |
|   | known as:   |
|   | LIPITORTake |
|   |  1 tablet   |
|   | by mouth    |
|   | nightly.    |
|   | bisacodyl   |
|   | 10 MG       |
|   | suppository |
|   | Refills:    |
|   | 0Commonly   |
|   | known as:   |
|   | DULCOLAX    |
|   | calcium     |
|   | acetate 667 |
|   |  MG         |
|   | capsuleRefi |
|   | lls:        |
|   | 0Commonly   |
|   | known as:   |
|   | PHOSLO      |
|   | carvedilol  |
|   | 12.5 MG     |
|   | tabletQTY:  |
|   |  60         |
|   | tabletRefil |
|   | ls:         |
|   | 0Doctor's   |
|   | comments:   |
|   | Hold for    |
|   | SBP less    |
|   | than        |
|   | 100Hold for |
|   |  HR less    |
|   | than        |
|   | 60Commonly  |
|   | known as:   |
|   | COREGTake 1 |
|   |  tablet by  |
|   | mouth 2     |
|   | (two) times |
|   |  daily with |
|   |  meals.     |
|   | clopidogrel |
|   |  75 MG      |
|   | tabletQTY:  |
|   |  30         |
 
|   | tabletRefil |
|   | ls:         |
|   | 11Commonly  |
|   | known as:   |
|   | PLAVIXTake  |
|   | 1 tablet by |
|   |  mouth      |
|   | daily.      |
|   | esomeprazol |
|   | e 20 MG     |
|   | capsuleRefi |
|   | lls:        |
|   | 0Commonly   |
|   | known as:   |
|   | NEXIUM      |
|   | HYDROcodone |
|   | -acetaminop |
|   | hen 5-325   |
|   | MG per      |
|   | tabletQTY:  |
|   |  30         |
|   | tabletRefil |
|   | ls:         |
|   | 0Commonly   |
|   | known as:   |
|   | NORCOTake 1 |
|   |  tablet by  |
|   | mouth every |
|   |  4 (four)   |
|   | hours as    |
|   | needed.     |
|   | insulin     |
|   | aspart 100  |
|   | UNIT/ML     |
|   | injectionRe |
|   | fills:      |
|   | 0Commonly   |
|   | known as:   |
|   | NOVOLOG     |
|   | insulin     |
|   | degludec    |
|   | 100 UNIT/ML |
|   |             |
|   | injectionRe |
|   | fills:      |
|   | 0Commonly   |
|   | known as:   |
|   | TRESIBA     |
|   | isosorbide  |
|   | mononitrate |
|   |  60 MG 24   |
|   | hr          |
|   | tabletQTY:  |
|   |  30         |
|   | tabletRefil |
|   | ls:  1Take  |
|   | 1 tablet by |
|   |  mouth      |
|   | daily.      |
|   | loperamide  |
 
|   | 2 MG        |
|   | capsuleRefi |
|   | lls:        |
|   | 0Commonly   |
|   | known as:   |
|   | IMODIUM     |
|   | LORazepam 1 |
|   |  MG         |
|   | tabletQTY:  |
|   |  30         |
|   | tabletRefil |
|   | ls:         |
|   | 0Commonly   |
|   | known as:   |
|   | ATIVANTake  |
|   | 1 tablet by |
|   |  mouth      |
|   | every 8     |
|   | (eight)     |
|   | hours as    |
|   | needed for  |
|   | Anxiety.    |
|   | nitroGLYCER |
|   | IN 0.4 MG   |
|   | SL          |
|   | tabletQTY:  |
|   |  30         |
|   | tabletRefil |
|   | ls:         |
|   | 0Doctor's   |
|   | comments:   |
|   | Hold for    |
|   | SBP less    |
|   | than        |
|   | 100Commonly |
|   |  known as:  |
|   |             |
|   | NITROSTATPl |
|   | ace 1       |
|   | tablet      |
|   | under the   |
|   | tongue      |
|   | every 5     |
|   | (five)      |
|   | minutes as  |
|   | needed for  |
|   | Chest pain. |
|   |             |
|   | nortriptyli |
|   | ne 25 MG    |
|   | capsuleRefi |
|   | lls:        |
|   | 0Commonly   |
|   | known as:   |
|   | PAMELOR     |
|   | ondansetron |
|   |  4 MG       |
|   | disintegrat |
|   | ing         |
|   | tabletRefil |
 
|   | ls:         |
|   | 0Commonly   |
|   | known as:   |
|   | ZOFRAN-ODT  |
|   | oxybutynin  |
|   | 5 MG        |
|   | tabletRefil |
|   | ls:         |
|   | 0Commonly   |
|   | known as:   |
|   | DITROPAN    |
|   | prochlorper |
|   | azine 5 MG  |
|   | tabletQTY:  |
|   |  60         |
|   | tabletRefil |
|   | ls:         |
|   | 11Doctor's  |
|   | comments:   |
|   | Please      |
|   | consider 90 |
|   |  day        |
|   | supplies to |
|   |  promote    |
|   | better      |
|   | adherenceTA |
|   | KE ONE      |
|   | TABLET BY   |
|   | MOUTH TWICE |
|   |  DAILY AS   |
|   | NEEDED FOR  |
|   | NAUSEA      |
|   | ranitidine  |
|   | 150 MG      |
|   | tabletRefil |
|   | ls:         |
|   | 0Commonly   |
|   | known as:   |
|   | ZANTAC      |
|   | rOPINIRole  |
|   | 0.25 MG     |
|   | tabletRefil |
|   | ls:         |
|   | 0Commonly   |
|   | known as:   |
|   | REQUIP      |
|   | SLOW-MAG    |
|   | 71.5-119 MG |
|   |             |
|   | TbecRefills |
|   | :  0Generic |
|   |  drug:      |
|   | Magnesium   |
|   | Cl-Calcium  |
|   | Carbonate   |
|   | Vitamin D3  |
|   | 5000 units  |
|   | CapsRefills |
|   | :  0        |
|   | Vortioxetin |
 
|   | e HBr 10 MG |
|   |             |
|   | TabsRefills |
|   | :  0  *     |
|   | This list   |
|   | has 2       |
|   | medication( |
|   | s) that are |
|   |  the same   |
|   | as other    |
|   | medications |
|   |  prescribed |
|   |  for you.   |
|   | Read the    |
|   | directions  |
|   | carefully,  |
|   | and ask     |
|   | your doctor |
|   |  or other   |
|   | care        |
|   | provider to |
|   |  review     |
|   | them with   |
|   | you.    You |
|   |  might also |
|   |  be taking  |
|   | other       |
|   | medications |
|   |  not listed |
|   |  above. If  |
|   | you have    |
|   | questions   |
|   | about any   |
|   | of your     |
|   | other       |
|   | medications |
|   | , talk to   |
|   | the person  |
|   | who         |
|   | prescribed  |
|   | them or     |
|   | your        |
|   | Primary     |
|   | Care        |
|   | Provider.   |
|   |   STOP      |
|   | taking      |
|   | these       |
|   | medications |
|   |   aspirin   |
|   | 81 MG EC    |
|   | tablet      |
|   | Where to    |
|   | Get Your    |
|   | Medications |
|   |             |
|   | Information |
|   |  about      |
|   | where to    |
|   | get these   |
 
|   | medications |
|   |  is not yet |
|   |  available  |
|   |  Ask your   |
|   | nurse or    |
|   | doctor      |
|   | about these |
|   |             |
|   | medications |
|   |             |
|   | cefTAZidime |
|   |  2 g        |
|   | injection   |
|   |  vancomycin |
|   |  IVPB       |
|   | Prudence      |
|   | Lyn,    |
|   | MD-R2 |
|   | 0195:17 PM  |
+---+-------------+
 
 
 
+--------+-------------+---+---------------------+---+
| / | Procedure   |   |                     |   |
|    | Pass        |   |                     |   |
+--------+-------------+---+---------------------+---+
| / | Procedure   |   |                     |   |
|    | Pass        |   |                     |   |
+--------+-------------+---+---------------------+---+
| / | Procedure   |   |                     |   |
| 2019   | Pass        |   |                     |   |
+--------+-------------+---+---------------------+---+
| / | Orders Only |   |   Jessica Marley,  |   |
|    |             |   | [facility]                |   |
+--------+-------------+---+---------------------+---+
| / | Procedure   |   |                     |   |
|    | Pass        |   |                     |   |
+--------+-------------+---+---------------------+---+
| / | Refill      |   |   João Asher MD |   |
|    |             |   |                     |   |
+--------+-------------+---+---------------------+---+
 from Last 3 Months
 
 Immunizations
 
 
+----------------------+-------------------------------------------+----------+
| Name                 | Dates Previously Given                    | Next Due |
+----------------------+-------------------------------------------+----------+
| Hepatitis B Adult    | 2016                                |          |
+----------------------+-------------------------------------------+----------+
| INFLUENZA PF,        | 2018 (Deferred:  - Pt states she    |          |
| QUADRIVALENT         | already had flu shot at dialysis clinic.  |          |
| (PED/ADOL/ADULT)     | Refused vax in hospital)                  |          |
+----------------------+-------------------------------------------+----------+
| Influenza, Trivalent | 2016                                |          |
|  W/Preservative      |                                           |          |
+----------------------+-------------------------------------------+----------+
| Pneumococcal         | 2012                                |          |
 
| Polysaccharide       |                                           |          |
| 23-valent            |                                           |          |
+----------------------+-------------------------------------------+----------+
 
 
 
 Family History
 
 
+------------------+-----------+------+----------+
| Medical History  | Relation  | Name | Comments |
+------------------+-----------+------+----------+
| High cholesterol | Father    | pvd  |          |
+------------------+-----------+------+----------+
| Hypertension     | Father    | pvd  |          |
+------------------+-----------+------+----------+
| Diabetes         | Paternal  |      |          |
|                  | Grandmoth |      |          |
|                  | er        |      |          |
+------------------+-----------+------+----------+
| Kidney disease   | Neg Hx    |      |          |
+------------------+-----------+------+----------+
| Malig hypertherm | Neg Hx    |      |          |
+------------------+-----------+------+----------+
 
 
 
+----------------------+----------+----------+----------+
| Relation             | Name     | Status   | Comments |
+----------------------+----------+----------+----------+
| Father               | pvd      |  |          |
+----------------------+----------+----------+----------+
| Mother               | dementia |  |          |
+----------------------+----------+----------+----------+
| Paternal Grandmother |          |          |          |
+----------------------+----------+----------+----------+
 
 
 
 Social History
 
 
+---------------+------------+-----------+--------+------------------+
| Tobacco Use   | Types      | Packs/Day | Years  | Date             |
|               |            |           | Used   |                  |
+---------------+------------+-----------+--------+------------------+
| Former Smoker | Cigarettes | 1         | 30     | Quit: 2004 |
+---------------+------------+-----------+--------+------------------+
 
 
 
+---------------------+---+---+---+
| Smokeless Tobacco:  |   |   |   |
| Never Used          |   |   |   |
+---------------------+---+---+---+
 
 
 
+-----------------------------------------+
| Tobacco Cessation: Counseling Given: No |
 
| Comments: quite smoking 2004            |
+-----------------------------------------+
 
 
 
+-------------+-----------+---------+----------+
| Alcohol Use | Drinks/We | oz/Week | Comments |
|             | ek        |         |          |
+-------------+-----------+---------+----------+
| No          |           |         |          |
+-------------+-----------+---------+----------+
 
 
 
+------------------+---------------+
| Sex Assigned at  | Date Recorded |
| Birth            |               |
+------------------+---------------+
| Not on file      |               |
+------------------+---------------+
 
 
 
 Last Filed Vital Signs
 
 
+-------------------+---------------------+-------------------------+
| Vital Sign        | Reading             | Time Taken              |
+-------------------+---------------------+-------------------------+
| Blood Pressure    | 109/52              | 2019 11:53 AM PST |
+-------------------+---------------------+-------------------------+
| Pulse             | 71                  | 2019 11:53 AM PST |
+-------------------+---------------------+-------------------------+
| Temperature       | 36.8   C (98.2   F) | 2019  2:40 PM PST |
+-------------------+---------------------+-------------------------+
| Respiratory Rate  | 18                  | 02/15/2019 11:30 AM PST |
+-------------------+---------------------+-------------------------+
| Oxygen Saturation | 96%                 | 2019 11:53 AM PST |
+-------------------+---------------------+-------------------------+
| Inhaled Oxygen    | -                   | -                       |
| Concentration     |                     |                         |
+-------------------+---------------------+-------------------------+
| Weight            | 65.3 kg (144 lb)    | 2019 10:29 PM PST |
+-------------------+---------------------+-------------------------+
| Height            | 177.8 cm (5' 10")   | 2019 10:29 PM PST |
+-------------------+---------------------+-------------------------+
| Body Mass Index   | 20.66               | 2019 10:29 PM PST |
+-------------------+---------------------+-------------------------+
 
 
 
 Plan of Treatment
 
 
+--------+----------+-----------+----------------------+-------------+
| Date   | Type     | Specialty | Care Team            | Description |
+--------+----------+-----------+----------------------+-------------+
| / | Initial  |           |   Сергей Medeiros, |             |
| 2019   | consult  |           |  MD [address]  |             |
|        |          |           |  [place] [id number]  |             |
 
|        |          |           |  [phone]        |             |
|        |          |           | [phone] (Fax)   |             |
+--------+----------+-----------+----------------------+-------------+
 
 
 
+----------------------+-----------+---------------------------------+----------+
| Health Maintenance   | Due Date  | Last Done                       | Comments |
+----------------------+-----------+---------------------------------+----------+
| Diabetic Eye Exam    |  |                                 |          |
|                      | 9         |                                 |          |
+----------------------+-----------+---------------------------------+----------+
| Vaccine:             |  |                                 |          |
| Dtap/Tdap/Td (1 -    | 8         |                                 |          |
| Tdap)                |           |                                 |          |
+----------------------+-----------+---------------------------------+----------+
| Breast Cancer        |  |                                 |          |
| Screening            | 9         |                                 |          |
| (Mammogram)          |           |                                 |          |
+----------------------+-----------+---------------------------------+----------+
| Colon Cancer         |  |                                 |          |
| Screening            | 9         |                                 |          |
| (Colonoscopy)        |           |                                 |          |
+----------------------+-----------+---------------------------------+----------+
| Vaccine: Zoster (1   |  |                                 |          |
| of 2)                | 9         |                                 |          |
+----------------------+-----------+---------------------------------+----------+
| DEXA SCAN SCREENING  |  |                                 |          |
|                      | 4         |                                 |          |
+----------------------+-----------+---------------------------------+----------+
| Vaccine:             |  | 2012                      |          |
| Pneumococcal 65+     | 4         |                                 |          |
| High/Highest Risk (1 |           |                                 |          |
|  of 2 - PCV13)       |           |                                 |          |
+----------------------+-----------+---------------------------------+----------+
| Lung Cancer          |  | 2017                      |          |
| Screening            | 8         |                                 |          |
+----------------------+-----------+---------------------------------+----------+
| Hemoglobin A1c       |  | 2018, 2018,         |          |
|                      | 9         | 2017, Additional history  |          |
|                      |           | exists                          |          |
+----------------------+-----------+---------------------------------+----------+
| Vaccine: Influenza   |  | 2016                      |          |
| (Season Ended)       | 9         |                                 |          |
+----------------------+-----------+---------------------------------+----------+
| Diabetic Foot Exam   |  | 2018, 2017          |          |
|                      | 9         |                                 |          |
+----------------------+-----------+---------------------------------+----------+
 
 
 
 Implants
 
 
+-------------------------------+-------+--------+-------------+--------+--------+--------+
| Implanted                     | Type  | Area   | Manufacture | Device | Expira | Model  |
|                               |       |        | r           |        | tion   | /      |
|                               |       |        |             | Identi | Date   | Serial |
|                               |       |        |             | fier   |        |  / Lot |
+-------------------------------+-------+--------+-------------+--------+--------+--------+
 
| Express                       | Stent | Arteri | BOSTON      |        |        | RENAL  |
| Sd-2017Implanted: Qty: 1 |       | al     | SCIENTIFIC  |        |        | /BILIA |
|  on 2017 by Kirt Mclaughlin, |       |        | MAURICE - [employer] |        |        | RY /   |
|  MD                           |       |        |             |        |        | /[id number] |
|                               |       |        |             |        |        | 046    |
+-------------------------------+-------+--------+-------------+--------+--------+--------+
| Express                       | Stent | Left:  | BOSTON      |        |        | [id number] |
| Stent-2017Implanted: Qty: |       | Arteri | MEDICAL     |        |        | [id number] |
|  1 on 2017 by Kirt Mclaughlin  |       | al     | PRODUCTS    |        |        | 130    |
| Y, MD                         |       |        |             |        |        | /[id number] |
|                               |       |        |             |        |        | [id number] |
|                               |       |        |             |        |        | 694    |
|                               |       |        |             |        |        | /[id number] |
|                               |       |        |             |        |        | 695    |
+-------------------------------+-------+--------+-------------+--------+--------+--------+
| Synergy Georgi 3.5 X             | Stent | Corona | BOSTON      |        | / | [id number] |
| Implanted: Qty: 1  |       | ry     | SCIENTIFIC  |        |    | [id number] |
| on 2017 by Cooper,      |       |        |             |        |        | 50 /   |
| Marcos BRADSHAW MD               |       |        |             |        |        | / |
|                               |       |        |             |        |        | 346    |
+-------------------------------+-------+--------+-------------+--------+--------+--------+
| Synergy Georgi 2.25 X            | Stent | Corona | [employer]      |        | / | [id number] |
| Implanted: Qty: 1  |       | ry     | SCIENTIFIC  |        |    | [id number] |
| on 2017 by Cooper,      |       |        |             |        |        | 20 /   |
| Marcos BRADSHAW MD               |       |        |             |        |        | / |
|                               |       |        |             |        |        | 165    |
+-------------------------------+-------+--------+-------------+--------+--------+--------+
| Synergy Georgi 2.25 X            | Stent | Corona | [employer]ON      |        | / | [id number] |
| 8-2017Implanted: Qty: 1   |       | ry     | SCIENTIFIC  |        |    | [id number] |
| on 2017 by Cooper,      |       |        |             |        |        | 20 /   |
| Marcos BRADSHAW MD               |       |        |             |        |        | / |
|                               |       |        |             |        |        | 438    |
+-------------------------------+-------+--------+-------------+--------+--------+--------+
| Epic                          | Stent | Right: | BOSTON      |        | / | [id number] |
| Vascular-2019Implanted:  |       |        | SCIENTIFIC  |        |    | 2000 |
| Qty: 1 on 2019 by Arnoldo,  |       | Arteri |             |        |        | 020    |
| Kirt STEVENSON MD                     |       | al     |             |        |        | /N/A   |
|                               |       |        |             |        |        | / |
|                               |       |        |             |        |        | 135    |
+-------------------------------+-------+--------+-------------+--------+--------+--------+
| Supercable Iso Elastic        |       | Left:  | [employer]A     |        | / |  |
| Cerclage System   1.5 Mm      |       | Femur  |             |        |    | -1010  |
| PolymerImplanted: Qty: 1 on   |       |        |             |        |        | /      |
| 2016 by Black,          |       |        |             |        |        | / |
| MD Uriel                |       |        |             |        |        | 2      |
+-------------------------------+-------+--------+-------------+--------+--------+--------+
| Imp Hip Stem Bplr Slfctr      |       | Left:  | JJHCS DEPUY |        | / | 1035-4 |
| 48x28 - [id number]:    |       | Femur  |  ORTHO -    |        | 2020   | 8-000  |
| Qty: 1 on 2016 by       |       |        | DEPU        |        |        | /      |
| Uriel Roa MD         |       |        |             |        |        | /[id number] |
|                               |       |        |             |        |        | 3      |
+-------------------------------+-------+--------+-------------+--------+--------+--------+
| Corail Femoral StemImplanted: |       | Left:  | DEPUY       |        | / | [id number] |
|  Qty: 1 on 2016 by      |       | Femur  |             |        |    | 3 / /  |
| Uriel Roa MD         |       |        |             |        |        |        |
+-------------------------------+-------+--------+-------------+--------+--------+--------+
| Imp Hip Fem Hd Artc 28mm Pls5 |       | Left:  | JJHCS DEPUY |        | / | 1365-1 |
|  - [id number]: Qty: 1  |       | Femur  |  ORTHO -    |        | 2020   | 2-000  |
| on 2016 by Black,       |       |        | DEPU        |        |        | /      |
| MD Uriel                |       |        |             |        |        | / |
 
|                               |       |        |             |        |        | 1034   |
+-------------------------------+-------+--------+-------------+--------+--------+--------+
| Xgrft Vascu-Guard Ptch 0.8x8  |       |        | HOOKER      |        | / | VG-010 |
| - SnaImplanted: Qty: 1 on     |       |        | BIOSCIENCE  |        |    | 8N /   |
| 10/25/2016 by Kirt Mclauhglin MD  |       |        | - OLGA      |        |        | /[id number] |
|                               |       |        |             |        |        |  |
|                               |       |        |             |        |        | 9414   |
+-------------------------------+-------+--------+-------------+--------+--------+--------+
 
 
 
 Procedures
 
 
+----------------------+--------+-------------+----------------------+----------------------
+
| Procedure Name       | Priori | Date/Time   | Associated Diagnosis | Comments             
|
|                      | ty     |             |                      |                      
|
+----------------------+--------+-------------+----------------------+----------------------
+
| XR FOOT RIGHT        | Routin | 2019  |   Toe pain, right    |   Results for this   
|
|                      | e      |  2:58 PM    |                      | procedure are in the 
|
|                      |        | PST         |                      |  results section.    
|
+----------------------+--------+-------------+----------------------+----------------------
+
| XR FOOT LEFT         | Routin | 2019  |   Post-operative     |   Results for this   
|
|                      | e      |  2:58 PM    | state                | procedure are in the 
|
|                      |        | PST         |                      |  results section.    
|
+----------------------+--------+-------------+----------------------+----------------------
+
| POCT GLUCOSE         | Routin | 02/15/2019  |                      |   Results for this   
|
|                      | e      | 11:36 AM    |                      | procedure are in the 
|
|                      |        | PST         |                      |  results section.    
|
+----------------------+--------+-------------+----------------------+----------------------
+
| POCT GLUCOSE         | Routin | 02/15/2019  |                      |   Results for this   
|
|                      | e      |  6:01 AM    |                      | procedure are in the 
|
|                      |        | PST         |                      |  results section.    
|
+----------------------+--------+-------------+----------------------+----------------------
+
| POCT GLUCOSE         | Routin | 2019  |                      |   Results for this   
|
|                      | e      | 10:32 PM    |                      | procedure are in the 
|
|                      |        | PST         |                      |  results section.    
|
 
+----------------------+--------+-------------+----------------------+----------------------
+
| [facility] SEPTIC LACTIC   | STAT   | 2019  |                      |   Results for this   
|
| ACID                 |        | 10:12 PM    |                      | procedure are in the 
|
|                      |        | PST         |                      |  results section.    
|
+----------------------+--------+-------------+----------------------+----------------------
+
| [facility] SEPTIC LACTIC   | STAT   | 2019  |                      |   Results for this   
|
| ACID                 |        |  7:22 PM    |                      | procedure are in the 
|
|                      |        | PST         |                      |  results section.    
|
+----------------------+--------+-------------+----------------------+----------------------
+
| TSH                  | STAT   | 2019  |                      |   Results for this   
|
|                      |        |  5:39 PM    |                      | procedure are in the 
|
|                      |        | PST         |                      |  results section.    
|
+----------------------+--------+-------------+----------------------+----------------------
+
| FOLATE               | Add-On | 2019  |                      |   Results for this   
|
|                      |        |  5:39 PM    |                      | procedure are in the 
|
|                      |        | PST         |                      |  results section.    
|
+----------------------+--------+-------------+----------------------+----------------------
+
| VITAMIN B12          | Add-On | 2019  |                      |   Results for this   
|
|                      |        |  5:39 PM    |                      | procedure are in the 
|
|                      |        | PST         |                      |  results section.    
|
+----------------------+--------+-------------+----------------------+----------------------
+
| SEDIMENTATION RATE,  | STAT   | 2019  |                      |   Results for this   
|
| AUTOMATED            |        |  5:39 PM    |                      | procedure are in the 
|
|                      |        | PST         |                      |  results section.    
|
+----------------------+--------+-------------+----------------------+----------------------
+
| COMPREHENSIVE        | STAT   | 2019  |                      |   Results for this   
|
| METABOLIC PANEL      |        |  5:39 PM    |                      | procedure are in the 
|
|                      |        | PST         |                      |  results section.    
|
+----------------------+--------+-------------+----------------------+----------------------
+
| CBC W/AUTO DIFF      | STAT   | 2019  |                      |   Results for this   
|
 
| (REFLEX TO MANUAL)   |        |  5:39 PM    |                      | procedure are in the 
|
|                      |        | PST         |                      |  results section.    
|
+----------------------+--------+-------------+----------------------+----------------------
+
| C-REACTIVE PROTEIN   | STAT   | 2019  |                      |   Results for this   
|
|                      |        |  5:39 PM    |                      | procedure are in the 
|
|                      |        | PST         |                      |  results section.    
|
+----------------------+--------+-------------+----------------------+----------------------
+
| BLOOD CULTURE, SET 2 | STAT   | 2019  |                      |   Results for this   
|
|                      |        |  5:39 PM    |                      | procedure are in the 
|
|                      |        | PST         |                      |  results section.    
|
+----------------------+--------+-------------+----------------------+----------------------
+
| XR TOES RIGHT        | ASAP   | 2019  |                      |   Results for this   
|
|                      |        |  5:08 PM    |                      | procedure are in the 
|
|                      |        | PST         |                      |  results section.    
|
+----------------------+--------+-------------+----------------------+----------------------
+
| [facility] SEPTIC LACTIC   | STAT   | 2019  |                      |   Results for this   
|
| ACID                 |        |  4:55 PM    |                      | procedure are in the 
|
|                      |        | PST         |                      |  results section.    
|
+----------------------+--------+-------------+----------------------+----------------------
+
| BLOOD CULTURE, SET 1 | STAT   | 2019  |                      |   Results for this   
|
|                      |        |  4:55 PM    |                      | procedure are in the 
|
|                      |        | PST         |                      |  results section.    
|
+----------------------+--------+-------------+----------------------+----------------------
+
| ED INFORMATION       | Routin | 2019  |                      |   Results for this   
|
| EXCHANGE             | e      |  3:21 PM    |                      | procedure are in the 
|
|                      |        | PST         |                      |  results section.    
|
+----------------------+--------+-------------+----------------------+----------------------
+
| ED INFORMATION       | Routin | 2019  |                      |   Results for this   
|
| EXCHANGE             | e      |  3:58 PM    |                      | procedure are in the 
|
|                      |        | PST         |                      |  results section.    
|
 
+----------------------+--------+-------------+----------------------+----------------------
+
| POCT GLUCOSE         | Routin | 2019  |                      |   Results for this   
|
|                      | e      | 12:10 PM    |                      | procedure are in the 
|
|                      |        | PST         |                      |  results section.    
|
+----------------------+--------+-------------+----------------------+----------------------
+
| POCT GLUCOSE         | Routin | 2019  |                      |   Results for this   
|
|                      | e      |  5:55 AM    |                      | procedure are in the 
|
|                      |        | PST         |                      |  results section.    
|
+----------------------+--------+-------------+----------------------+----------------------
+
| PHOSPHOROUS          | Routin | 2019  |                      |   Results for this   
|
|                      | e      |  5:38 AM    |                      | procedure are in the 
|
|                      |        | PST         |                      |  results section.    
|
+----------------------+--------+-------------+----------------------+----------------------
+
| MAGNESIUM            | Routin | 2019  |                      |   Results for this   
|
|                      | e      |  5:38 AM    |                      | procedure are in the 
|
|                      |        | PST         |                      |  results section.    
|
+----------------------+--------+-------------+----------------------+----------------------
+
| BASIC METABOLIC      | Routin | 2019  |                      |   Results for this   
|
| PANEL                | e      |  5:38 AM    |                      | procedure are in the 
|
|                      |        | PST         |                      |  results section.    
|
+----------------------+--------+-------------+----------------------+----------------------
+
| CBC W/AUTO DIFF      | Routin | 2019  |                      |   Results for this   
|
| (REFLEX TO MANUAL)   | e      |  5:38 AM    |                      | procedure are in the 
|
|                      |        | PST         |                      |  results section.    
|
+----------------------+--------+-------------+----------------------+----------------------
+
| POCT GLUCOSE         | Routin | 2019  |                      |   Results for this   
|
|                      | e      |  9:29 PM    |                      | procedure are in the 
|
|                      |        | PST         |                      |  results section.    
|
+----------------------+--------+-------------+----------------------+----------------------
+
| POCT GLUCOSE         | Routin | 2019  |                      |   Results for this   
|
 
|                      | e      |  4:06 PM    |                      | procedure are in the 
|
|                      |        | PST         |                      |  results section.    
|
+----------------------+--------+-------------+----------------------+----------------------
+
| POCT GLUCOSE         | Routin | 2019  |                      |   Results for this   
|
|                      | e      | 12:13 PM    |                      | procedure are in the 
|
|                      |        | PST         |                      |  results section.    
|
+----------------------+--------+-------------+----------------------+----------------------
+
| EKG STANDARD 12 LEAD | Routin | 2019  |                      |   Results for this   
|
|                      | e      |  6:18 AM    |                      | procedure are in the 
|
|                      |        | PST         |                      |  results section.    
|
+----------------------+--------+-------------+----------------------+----------------------
+
| POCT GLUCOSE         | Routin | 2019  |                      |   Results for this   
|
|                      | e      |  5:21 AM    |                      | procedure are in the 
|
|                      |        | PST         |                      |  results section.    
|
+----------------------+--------+-------------+----------------------+----------------------
+
| BASIC METABOLIC      | Routin | 2019  |                      |   Results for this   
|
| PANEL                | e - AM |  4:53 AM    |                      | procedure are in the 
|
|                      |        | PST         |                      |  results section.    
|
+----------------------+--------+-------------+----------------------+----------------------
+
| CBC W/AUTO DIFF      | Routin | 2019  |                      |   Results for this   
|
| (REFLEX TO MANUAL)   | e - AM |  4:53 AM    |                      | procedure are in the 
|
|                      |        | PST         |                      |  results section.    
|
+----------------------+--------+-------------+----------------------+----------------------
+
| POCT GLUCOSE         | Routin | 2019  |                      |   Results for this   
|
|                      | e      |  9:00 PM    |                      | procedure are in the 
|
|                      |        | PST         |                      |  results section.    
|
+----------------------+--------+-------------+----------------------+----------------------
+
| IR STENT FEMORAL     | Routin | 2019  |                      |   Results for this   
|
| POPLITEAL            | e      |  6:45 PM    |                      | procedure are in the 
|
|                      |        | PST         |                      |  results section.    
|
 
+----------------------+--------+-------------+----------------------+----------------------
+
| IR AORTAGRAM         | Routin | 2019  |                      |   Results for this   
|
| ABDOMINAL            | e      |  6:45 PM    |                      | procedure are in the 
|
| SERIALOGRAM          |        | PST         |                      |  results section.    
|
+----------------------+--------+-------------+----------------------+----------------------
+
| IR ANGIOGRAM         | Routin | 2019  |                      |   Results for this   
|
| EXTREMITY RIGHT      | e      |  6:45 PM    |                      | procedure are in the 
|
|                      |        | PST         |                      |  results section.    
|
+----------------------+--------+-------------+----------------------+----------------------
+
| BLOOD CULTURE, SET 2 | Timed  | 2019  |                      |   Results for this   
|
|                      |        |  6:05 PM    |                      | procedure are in the 
|
|                      |        | PST         |                      |  results section.    
|
+----------------------+--------+-------------+----------------------+----------------------
+
| IR GUIDANCE VASCULAR | Routin | 2019  |                      |   Results for this   
|
|  ACCESS US           | e      |  5:40 PM    |                      | procedure are in the 
|
|                      |        | PST         |                      |  results section.    
|
+----------------------+--------+-------------+----------------------+----------------------
+
| C-REACTIVE PROTEIN   | STAT   | 2019  |                      |   Results for this   
|
|                      |        |  3:43 PM    |                      | procedure are in the 
|
|                      |        | PST         |                      |  results section.    
|
+----------------------+--------+-------------+----------------------+----------------------
+
| SEDIMENTATION RATE,  | STAT   | 2019  |                      |   Results for this   
|
| AUTOMATED            |        |  3:43 PM    |                      | procedure are in the 
|
|                      |        | PST         |                      |  results section.    
|
+----------------------+--------+-------------+----------------------+----------------------
+
| CK                   | STAT   | 2019  |                      |   Results for this   
|
|                      |        |  3:43 PM    |                      | procedure are in the 
|
|                      |        | PST         |                      |  results section.    
|
+----------------------+--------+-------------+----------------------+----------------------
+
| COMPREHENSIVE        | STAT   | 2019  |                      |   Results for this   
|
 
| METABOLIC PANEL      |        |  3:43 PM    |                      | procedure are in the 
|
|                      |        | PST         |                      |  results section.    
|
+----------------------+--------+-------------+----------------------+----------------------
+
| CBC W/MANUAL DIFF    | STAT   | 2019  |                      |   Results for this   
|
|                      |        |  3:43 PM    |                      | procedure are in the 
|
|                      |        | PST         |                      |  results section.    
|
+----------------------+--------+-------------+----------------------+----------------------
+
| BLOOD CULTURE, SET 1 | Timed  | 2019  |                      |   Results for this   
|
|                      |        |  3:43 PM    |                      | procedure are in the 
|
|                      |        | PST         |                      |  results section.    
|
+----------------------+--------+-------------+----------------------+----------------------
+
| US LOWER EXTREMITY   | STAT   | 2019  |                      |   Results for this   
|
| ARTERIAL RIGHT       |        |  2:23 PM    |                      | procedure are in the 
|
|                      |        | PST         |                      |  results section.    
|
+----------------------+--------+-------------+----------------------+----------------------
+
| ED INFORMATION       | Routin | 2019  |                      |   Results for this   
|
| EXCHANGE             | e      |  1:03 PM    |                      | procedure are in the 
|
|                      |        | PST         |                      |  results section.    
|
+----------------------+--------+-------------+----------------------+----------------------
+
 from Last 3 Months
 
 Results
 X-ray foot right 3+ views (2019  2:58 PM)
 
+------------------------------------------------------------------------+--------------+
| Impressions                                                            | Performed At |
+------------------------------------------------------------------------+--------------+
|   Linear lucency/irregularity at the distal 1st phalanx, may represent |   [facility]     |
|   minimally displaced fracture.  Signed by: Oleksandr Lemus  Sign         | RADIOLOGY    |
| Date/Time: 2019 10:20 AM                                         |              |
+------------------------------------------------------------------------+--------------+
 
 
 
+------------------------------------------------------------------------+--------------+
| Narrative                                                              | Performed At |
+------------------------------------------------------------------------+--------------+
|   RIGHT FOOT THREE VIEWS  CLINICAL INFORMATION:  Possible fracture of  |   [facility]     |
| right hallux.  COMPARISON:  XR TOES RIGHT (2019); XR FOOT LEFT    | RADIOLOGY    |
| (2018);  FINDINGS:  No acute fracture or dislocation.  Small     |              |
| calcaneal plantar enthesophyte.  Linear lucency/irregularity at the    |              |
 
| distal 1st phalanx, may represent  minimally displaced fracture.  Mild |              |
|  midfoot degeneration.                                                 |              |
+------------------------------------------------------------------------+--------------+
 
 
 
+-------------------------------------------------------------------------+
| Procedure Note                                                          |
+-------------------------------------------------------------------------+
|   Denny, Rad Results In - 2019 10:23 AM PST  RIGHT FOOT THREE VIEWS |
| CLINICAL INFORMATION:                                                   |
| Possible fracture of right hallux.                                      |
| COMPARISON:                                                             |
| XR TOES RIGHT (2019); XR FOOT LEFT (2018);                   |
| FINDINGS:                                                               |
| No acute fracture or dislocation.                                       |
| Small calcaneal plantar enthesophyte.                                   |
| Linear lucency/irregularity at the distal 1st phalanx, may represent    |
| minimally displaced fracture.                                           |
| Mild midfoot degeneration.                                              |
| IMPRESSION:                                                             |
| Linear lucency/irregularity at the distal 1st phalanx, may represent    |
| minimally displaced fracture.                                           |
| Signed by: Oleksandr Lemus                                                 |
| Sign Date/Time: 2019 10:20 AM                                     |
+-------------------------------------------------------------------------+
 
 
 
+--------------------+------------------+--------------------+--------------+
| Performing         | Address          | City/State/Zipcode | Phone Number |
| Organization       |                  |                    |              |
+--------------------+------------------+--------------------+--------------+
|   [facility] |   [address] | [place] [address] |              |
+--------------------+------------------+--------------------+--------------+
 X-ray foot left 3 + views (2019  2:58 PM)
 
+----------------------------------------------------------------------+--------------+
| Impressions                                                          | Performed At |
+----------------------------------------------------------------------+--------------+
|   No acute fracture.    No radiographic evidence for osteomyelitis   |   [facility]     |
| Signed by: Oleksandr Lemus  Sign Date/Time: 2019 10:21 AM         | RADIOLOGY    |
+----------------------------------------------------------------------+--------------+
 
 
 
+------------------------------------------------------------------------+--------------+
| Narrative                                                              | Performed At |
+------------------------------------------------------------------------+--------------+
|   LEFT FOOT THREE VIEWS  CLINICAL INFORMATION:  8 weeks post op,       |   [facility]     |
| forefoot amputation.  COMPARISON:  XR TOES RIGHT (2019); XR FOOT  | RADIOLOGY    |
| LEFT (2018); XR FOOT LEFT  (2018);  FINDINGS:  Amputation  |              |
| of the forefoot at the level of the proximal metacarpals.  No          |              |
| radiographic evidence for osteomyelitis.  No acute fractures.          |              |
+------------------------------------------------------------------------+--------------+
 
 
 
+------------------------------------------------------------------------+
| Procedure Note                                                         |
 
+------------------------------------------------------------------------+
|   Lauro Baldwin Results In - 2019 10:25 AM PST  LEFT FOOT THREE VIEWS |
| CLINICAL INFORMATION:                                                  |
| 8 weeks post op, forefoot amputation.                                  |
| COMPARISON:                                                            |
| XR TOES RIGHT (2019); XR FOOT LEFT (2018); XR FOOT LEFT     |
| (2018);                                                          |
| FINDINGS:                                                              |
| Amputation of the forefoot at the level of the proximal metacarpals.   |
| No radiographic evidence for osteomyelitis.                            |
| No acute fractures.                                                    |
| IMPRESSION:                                                            |
| No acute fracture.  No radiographic evidence for osteomyelitis         |
| Signed by: Oleksandr Lemus                                                |
| Sign Date/Time: 2019 10:21 AM                                    |
+------------------------------------------------------------------------+
 
 
 
+--------------------+------------------+--------------------+--------------+
| Performing         | Address          | City/State/Zipcode | Phone Number |
| Organization       |                  |                    |              |
+--------------------+------------------+--------------------+--------------+
|   [facility] |   [address] | [place] [address] |              |
+--------------------+------------------+--------------------+--------------+
 POCT glucose (02/15/2019 11:36 AM)Only the most recent of 10 results within the time period
 is included.
 
+--------------------+--------------------------+---------------+-----------------+
| Component          | Value                    | Ref Range     | Performed At    |
+--------------------+--------------------------+---------------+-----------------+
| GLUCOSE,POC SCREEN | 231 (H)Comment: Testing  | 65 - 99 mg/dL | [facility] |
|                    | performed at [facility];[address]     |               |                 |
|                    | [address];[place]   |               |                 |
|                    | [id number]                    |               |                 |
+--------------------+--------------------------+---------------+-----------------+
 
 
 
+-------------------+------------------+--------------------+--------------+
| Performing        | Address          | City/State/Zipcode | Phone Number |
| Organization      |                  |                    |              |
+-------------------+------------------+--------------------+--------------+
|   [facility] |   [address] | [place] [address] |              |
+-------------------+------------------+--------------------+--------------+
 Septic Lactic Acid (2019 10:12 PM)Only the most recent of 3 results within the time krystian sparks is included.
 
+-------------+-------------------------+------------------+-----------------+
| Component   | Value                   | Ref Range        | Performed At    |
+-------------+-------------------------+------------------+-----------------+
| LACTIC ACID | 2.0Comment: Testing     | 0.4 - 2.0 mmol/L | [facility] |
|             | performed at [facility];[address]    |                  |                 |
|             | [address];[place]  |                  |                 |
|             | [id number]                   |                  |                 |
+-------------+-------------------------+------------------+-----------------+
 
 
 
+-------------------+------------------+--------------------+--------------+
 
| Performing        | Address          | City/State/Zipcode | Phone Number |
| Organization      |                  |                    |              |
+-------------------+------------------+--------------------+--------------+
|   [facility] |   [address] | [place] [address] |              |
+-------------------+------------------+--------------------+--------------+
 Blood Culture Set 2 (2019  5:39 PM)Only the most recent of 2 results within the time 
period is included.
 
+----------------------+------------------------+-----------+-----------------+
| Component            | Value                  | Ref Range | Performed At    |
+----------------------+------------------------+-----------+-----------------+
| Specimen Description | BLOOD, PERIPHERAL DRAW |           | [facility]      |
|                      |                        |           | LABORATORY      |
+----------------------+------------------------+-----------+-----------------+
| SPECIAL REQUESTS     | R HAND                 |           | [facility] |
+----------------------+------------------------+-----------+-----------------+
| CULTURE              | NO GROWTH 6 DAYS       |           | [facility]      |
|                      |                        |           | LABORATORY      |
+----------------------+------------------------+-----------+-----------------+
 
 
 
+-----------------+
| Specimen        |
+-----------------+
| Blood - Blood,  |
| peripheral draw |
+-----------------+
 
 
 
+-------------------+-------------------------+---------------------+----------------+
| Performing        | Address                 | City/State/Zipcode  | Phone Number   |
| Organization      |                         |                     |                |
+-------------------+-------------------------+---------------------+----------------+
|   [facility]      |   [address] [place] [address]  | [place] [address] |   [phone] |
| LABORATORY        | [address]                   |                     |                |
+-------------------+-------------------------+---------------------+----------------+
|   [facility] |   [address]        | [place] [address]  |                |
+-------------------+-------------------------+---------------------+----------------+
 Sedimentation Rate (ESR) (2019  5:39 PM)Only the most recent of 2 results within the 
time period is included.
 
+-----------+-------------------------+--------------+-----------------+
| Component | Value                   | Ref Range    | Performed At    |
+-----------+-------------------------+--------------+-----------------+
| ESR       | 13Comment: Testing      | 0 - 30 mm/Hr | [facility] |
|           | performed at [facility];[address]    |              |                 |
|           | [address];[place]  |              |                 |
|           | [id number]                   |              |                 |
+-----------+-------------------------+--------------+-----------------+
 
 
 
+----------+
| Specimen |
+----------+
| Blood    |
+----------+
 
 
 
 
+-------------------+------------------+--------------------+--------------+
| Performing        | Address          | City/State/Zipcode | Phone Number |
| Organization      |                  |                    |              |
+-------------------+------------------+--------------------+--------------+
|   [facility] |   [address] | [place] [address] |              |
+-------------------+------------------+--------------------+--------------+
 CBC with differential (2019  5:39 PM)Only the most recent of 3 results within the rebekah
e period is included.
 
+-------------------+------------------------+-------------------+-----------------+
| Component         | Value                  | Ref Range         | Performed At    |
+-------------------+------------------------+-------------------+-----------------+
| WBC               | 5.14                   | 3.80 - 11.00 K/uL | [facility] |
+-------------------+------------------------+-------------------+-----------------+
| RBC               | 2.91 (L)               | 3.70 - 5.10 M/uL  | [facility] |
+-------------------+------------------------+-------------------+-----------------+
| HGB               | 10.1 (L)               | 11.3 - 15.5 g/dL  | [facility] |
+-------------------+------------------------+-------------------+-----------------+
| HCT               | 30.9 (L)               | 34.0 - 46.0 %     | [facility] |
+-------------------+------------------------+-------------------+-----------------+
| MCV               | 106.1 (H)              | 80.0 - 100.0 fl   | [facility] |
+-------------------+------------------------+-------------------+-----------------+
| MCH               | 34.8 (H)               | 27.0 - 34.0 pg    | [facility] |
+-------------------+------------------------+-------------------+-----------------+
| MCHC              | 32.8                   | 32.0 - 35.5 g/dL  | [facility] |
+-------------------+------------------------+-------------------+-----------------+
| RDW SD            | 61.3 (H)               | 37 - 53 fl        | [facility] |
+-------------------+------------------------+-------------------+-----------------+
| PLT               | 145 (L)                | 150 - 400 K/uL    | [facility] |
+-------------------+------------------------+-------------------+-----------------+
| MPV               | 9.3                    | fl                | [facility] |
+-------------------+------------------------+-------------------+-----------------+
| DIFF TYPE         | AUTOMATED              |                   | [facility] |
+-------------------+------------------------+-------------------+-----------------+
| NEUTROPHILS       | 74.03                  | %                 | [facility] |
+-------------------+------------------------+-------------------+-----------------+
| LYMPHOCYTES       | 20.20                  | %                 | [facility] |
+-------------------+------------------------+-------------------+-----------------+
| MONOCYTES         | 5.16                   | %                 | [facility] |
+-------------------+------------------------+-------------------+-----------------+
| EOSINOPHILS       | 0.03                   | %                 | [facility] |
+-------------------+------------------------+-------------------+-----------------+
| BASOPHILS         | 0.58                   | %                 | [facility] |
+-------------------+------------------------+-------------------+-----------------+
| NEUTROPHILS ABS   | 3.81                   | 1.90 - 7.40 K/uL  | [facility] |
+-------------------+------------------------+-------------------+-----------------+
| LYMPHOCYTES ABS   | 1.04                   | 1.00 - 3.90 K/uL  | [facility] |
+-------------------+------------------------+-------------------+-----------------+
| MONOCYTES ABS     | 0.27                   | 0.00 - 0.80 K/uL  | [facility] |
+-------------------+------------------------+-------------------+-----------------+
| EOSINOPHILS ABS   | 0.00                   | 0.00 - 0.50 K/uL  | [facility] |
+-------------------+------------------------+-------------------+-----------------+
| BASOPHILS ABS     | 0.03                   | 0.00 - 0.10 K/uL  | [facility] |
+-------------------+------------------------+-------------------+-----------------+
| MORPHOLOGY        | 1+                     |                   | [facility] |
|                   | Comment:               |                   |                 |
|                   | ANISO                  |                   |                 |
|                   | 1+                     |                   |                 |
 
|                   | MACRO                  |                   |                 |
|                   | NORMAL PLT MORPH       |                   |                 |
|                   |                        |                   |                 |
+-------------------+------------------------+-------------------+-----------------+
| Platelet Estimate | DECREASEDComment:      |                   | [facility] |
|                   | Testing performed at   |                   |                 |
|                   | [facility];[address]          |                   |                 |
|                   [address];[place] [address] |                   |                 |
+-------------------+------------------------+-------------------+-----------------+
 
 
 
+----------+
| Specimen |
+----------+
| Blood    |
+----------+
 
 
 
+-------------------+------------------+--------------------+--------------+
| Performing        | Address          | City/State/Zipcode | Phone Number |
| Organization      |                  |                    |              |
+-------------------+------------------+--------------------+--------------+
|   [facility] |   [address] | [place] [address] |              |
+-------------------+------------------+--------------------+--------------+
 C-Reactive Protein (2019  5:39 PM)Only the most recent of 2 results within the time p
clare is included.
 
+-----------+--------------------------+------------+-----------------+
| Component | Value                    | Ref Range  | Performed At    |
+-----------+--------------------------+------------+-----------------+
| CRP       | 0.8 (H)Comment: Testing  | <0.5 mg/dL | [facility] |
|           | performed at [facility];[address]     |            |                 |
|           | [address];[place], [place]   |            |                 |
|           | [id number]                    |            |                 |
+-----------+--------------------------+------------+-----------------+
 
 
 
+----------+
| Specimen |
+----------+
| Blood    |
+----------+
 
 
 
+-------------------+------------------+--------------------+--------------+
| Performing        | Address          | City/State/Zipcode | Phone Number |
| Organization      |                  |                    |              |
+-------------------+------------------+--------------------+--------------+
|   [facility] |   [address] | [place] [address] |              |
+-------------------+------------------+--------------------+--------------+
 TSH (2019  5:39 PM)
 
+-----------+-------------------------+----------------------+-----------------+
| Component | Value                   | Ref Range            | Performed At    |
+-----------+-------------------------+----------------------+-----------------+
| TSH       | 0.904Comment: Testing   | 0.450 - 5.100 uIU/mL | [facility] |
 
|           | performed at [facility];[address]    |                      |                 |
|           | [address];[place]  |                      |                 |
|           | [id number]                   |                      |                 |
+-----------+-------------------------+----------------------+-----------------+
 
 
 
+----------+
| Specimen |
+----------+
| Blood    |
+----------+
 
 
 
+-------------------+------------------+--------------------+--------------+
| Performing        | Address          | City/State/Zipcode | Phone Number |
| Organization      |                  |                    |              |
+-------------------+------------------+--------------------+--------------+
|   [facility] |   [address] | [place] [address] |              |
+-------------------+------------------+--------------------+--------------+
 Folate (2019  5:39 PM)
 
+-----------+--------------------------+------------+--------------+
| Component | Value                    | Ref Range  | Performed At |
+-----------+--------------------------+------------+--------------+
| FOLATE    | 8.0Comment: Testing      | >5.4 ng/mL | [facility]   |
|           | performed at [facility], [address] |            | LABORATORY   |
|           |  [address],        |            |              |
|           | [place]  [address]     |            |              |
+-----------+--------------------------+------------+--------------+
 
 
 
+----------+
| Specimen |
+----------+
| Blood    |
+----------+
 
 
 
+---------------+-------------------------+---------------------+----------------+
| Performing    | Address                 | City/State/Zipcode  | Phone Number   |
| Organization  |                         |                     |                |
+---------------+-------------------------+---------------------+----------------+
|   [facility]  |   [address]  | [place] [address] |   [phone] |
| LABORATORY    | [address]                   |                     |                |
+---------------+-------------------------+---------------------+----------------+
 Vitamin B12 (2019  5:39 PM)
 
+-------------+--------------------------+-------------------+--------------+
| Component   | Value                    | Ref Range         | Performed At |
+-------------+--------------------------+-------------------+--------------+
| VITAMIN B12 | 553Comment: Testing      | 254 - 1,320 pg/mL | [facility]   |
|             | performed at [facility], [address] |                   | LABORATORY   |
|             |  [address],        |                   |              |
|             | [place]  [address]     |                   |              |
+-------------+--------------------------+-------------------+--------------+
 
 
 
 
+----------+
| Specimen |
+----------+
| Blood    |
+----------+
 
 
 
+---------------+-------------------------+---------------------+----------------+
| Performing    | Address                 | City/State/Zipcode  | Phone Number   |
| Organization  |                         |                     |                |
+---------------+-------------------------+---------------------+----------------+
|   [facility]  |   [address]  | [place] [address] |   [phone] |
| LABORATORY    | [address]                   |                     |                |
+---------------+-------------------------+---------------------+----------------+
 Comprehensive metabolic panel (2019  5:39 PM)Only the most recent of 2 results within
 the time period is included.
 
+----------------+--------------------------+-------------------+-----------------+
| Component      | Value                    | Ref Range         | Performed At    |
+----------------+--------------------------+-------------------+-----------------+
| SODIUM         | 143                      | 135 - 145 mmol/L  | [facility] |
+----------------+--------------------------+-------------------+-----------------+
| POTASSIUM      | 4.7                      | 3.5 - 4.9 mmol/L  | [facility] |
+----------------+--------------------------+-------------------+-----------------+
| CHLORIDE       | 96 (L)                   | 99 - 109 mmol/L   | [facility] |
+----------------+--------------------------+-------------------+-----------------+
| CO2            | >40 (HH)Comment: CALLED  | 23 - 32 mmol/L    | [facility] |
|                | NURSING UNITREAD BACK    |                   |                 |
|                | RESULTS VERIFIEDCALLED   |                   |                 |
|                | TO RN IN ED AT 1830 BY   |                   |                 |
|                | LGJ                      |                   |                 |
|                |                          |                   |                 |
+----------------+--------------------------+-------------------+-----------------+
| ANION GAP AGAP | UNABLE TO CALCULATE      | 5 - 20 mmol/L     | [facility] |
+----------------+--------------------------+-------------------+-----------------+
| GLUCOSE        | 160 (H)                  | 65 - 99 mg/dL     | [facility] |
+----------------+--------------------------+-------------------+-----------------+
| BUN            | 9                        | 8 - 25 mg/dL      | [facility] |
+----------------+--------------------------+-------------------+-----------------+
| CREATININE     | 2.96 (H)                 | 0.50 - 1.00 mg/dL | [facility] |
+----------------+--------------------------+-------------------+-----------------+
| BUN/CREAT      | 3                        |                   | [facility] |
+----------------+--------------------------+-------------------+-----------------+
| CALCIUM        | 9.4                      | 8.5 - 10.5 mg/dL  | [facility] |
+----------------+--------------------------+-------------------+-----------------+
| TOTAL PROTEIN  | 6.7                      | 6.3 - 8.2 g/dL    | [facility] |
+----------------+--------------------------+-------------------+-----------------+
| Albumin        | 4.3                      | 3.3 - 4.8 g/dL    | [facility] |
+----------------+--------------------------+-------------------+-----------------+
| GLOBULIN       | 2.4                      | 1.3 - 4.9 g/dL    | [facility] |
+----------------+--------------------------+-------------------+-----------------+
| A/G            | 1.8                      | 1.0 - 2.4         | [facility] |
+----------------+--------------------------+-------------------+-----------------+
| TBIL           | 0.5                      | 0.1 - 1.5 mg/dL   | [facility] |
+----------------+--------------------------+-------------------+-----------------+
| ALK PHOS       | 103                      | 35 - 115 U/L      | [facility] |
+----------------+--------------------------+-------------------+-----------------+
 
| AST            | 54 (H)                   | 10 - 45 U/L       | [facility] |
+----------------+--------------------------+-------------------+-----------------+
| ALT            | 40                       | 10 - 65 U/L       | [facility] |
+----------------+--------------------------+-------------------+-----------------+
| EGFR           | 16 (L)Comment: GFR <60:  | >60 mL/min/1.73m2 | [facility] |
|                | CHRONIC KIDNEY DISEASE,  |                   |                 |
|                | IF FOUND OVER A 3 MONTH  |                   |                 |
|                | PERIOD.GFR <15: KIDNEY   |                   |                 |
|                | FAILURE.FOR       |                   |                 |
|                | [employer]IC[employer], MULTIPLY THE  |                   |                 |
|                | CALCULATED GFR BY        |                   |                 |
|                | 1.210.This eGFR is       |                   |                 |
|                | calculated using the     |                   |                 |
|                | MDRD IDMS traceable      |                   |                 |
|                | equation.Testing         |                   |                 |
|                | performed at [facility];[address]     |                   |                 |
|                | [facility] [address];[place], [place]   |                   |                 |
|                | [address]                    |                   |                 |
+----------------+--------------------------+-------------------+-----------------+
 
 
 
+----------+
| Specimen |
+----------+
| Blood    |
+----------+
 
 
 
+-------------------+------------------+--------------------+--------------+
| Performing        | Address          | City/State/Zipcode | Phone Number |
| Organization      |                  |                    |              |
+-------------------+------------------+--------------------+--------------+
|   [facility] |   [address] | [place] [address] |              |
+-------------------+------------------+--------------------+--------------+
 XR Toe Right 2 View (2019  5:08 PM)
 
+----------------------------------------------------------------------+--------------+
| Impressions                                                          | Performed At |
+----------------------------------------------------------------------+--------------+
|   No radiographic evidence of osteomyelitis seen.    If further      |   [facility]     |
| assessment  is warranted, consider correlation with MRI.  Potential  | RADIOLOGY    |
| acute fracture through the base of the distal phalanx.  Signed by:   |              |
| Gavin South  Sign Date/Time: 2019 5:15 PM                      |              |
+----------------------------------------------------------------------+--------------+
 
 
 
+------------------------------------------------------------------------+--------------+
| Narrative                                                              | Performed At |
+------------------------------------------------------------------------+--------------+
|   RIGHT TOE  CLINICAL INFORMATION:  Other (see comments) Pain, concern |   [facility]     |
|  for osteomyelitis.  COMPARISON:  XR FOOT LEFT (2018); XR FOOT   | RADIOLOGY    |
| LEFT (2018); XR FOOT LEFT  (2018);  FINDINGS:  Advanced     |              |
| degenerative changes identified involving the interphalangeal  joint   |              |
| of the 1st digit.    On the lateral view, there appears to be  lucency |              |
|  through the base of the phalanx, potentially reflecting an  acute     |              |
| fracture.    No erosive or destructive changes appreciated to  suggest |              |
|  osteomyelitis.                                                        |              |
 
+------------------------------------------------------------------------+--------------+
 
 
 
+------------------------------------------------------------------------+
| Procedure Note                                                         |
+------------------------------------------------------------------------+
|   Lauro Baldwin Results In - 2019  5:18 PM PST  RIGHT TOE             |
| CLINICAL INFORMATION:                                                  |
| Other (see comments) Pain, concern for osteomyelitis.                  |
| COMPARISON:                                                            |
| XR FOOT LEFT (2018); XR FOOT LEFT (2018); XR FOOT LEFT     |
| (2018);                                                           |
| FINDINGS:                                                              |
| Advanced degenerative changes identified involving the interphalangeal |
| joint of the 1st digit.  On the lateral view, there appears to be      |
| lucency through the base of the phalanx, potentially reflecting an     |
| acute fracture.  No erosive or destructive changes appreciated to      |
| suggest osteomyelitis.                                                 |
| IMPRESSION:                                                            |
| No radiographic evidence of osteomyelitis seen.  If further assessment |
| is warranted, consider correlation with MRI.                           |
| Potential acute fracture through the base of the distal phalanx.       |
| Signed by: Gavin South                                                 |
| Sign Date/Time: 2019 5:15 PM                                     |
+------------------------------------------------------------------------+
 
 
 
+--------------------+------------------+--------------------+--------------+
| Performing         | Address          | City/State/Zipcode | Phone Number |
| Organization       |                  |                    |              |
+--------------------+------------------+--------------------+--------------+
|   [facility] |   [address] | [place] [address] |              |
+--------------------+------------------+--------------------+--------------+
 Blood Culture Set 1 (2019  4:55 PM)Only the most recent of 2 results within the time 
period is included.
 
+----------------------+------------------+-----------+-----------------+
| Component            | Value            | Ref Range | Performed At    |
+----------------------+------------------+-----------+-----------------+
| Specimen Description | BLOOD, LINE DRAW |           | [facility]      |
|                      |                  |           | [facility]      |
+----------------------+------------------+-----------+-----------------+
| SPECIAL REQUESTS     | R FOREARM        |           | [facility] |
+----------------------+------------------+-----------+-----------------+
| CULTURE              | NO GROWTH 6 DAYS |           | [facility]      |
|                      |                  |           | LABORATORY      |
+----------------------+------------------+-----------+-----------------+
 
 
 
+---------------------+
| Specimen            |
+---------------------+
| Blood - Blood Line  |
| Draw                |
+---------------------+
 
 
 
 
+-------------------+-------------------------+---------------------+----------------+
| Performing        | Address                 | City/State/Zipcode  | Phone Number   |
| Organization      |                         |                     |                |
+-------------------+-------------------------+---------------------+----------------+
|   [facility]      |   [address]  | [place] [address] |   [phone] |
| LABORATORY        | [address]                   |                     |                |
+-------------------+-------------------------+---------------------+----------------+
|   [facility] |   [address]        | [place] [address]  |                |
+-------------------+-------------------------+---------------------+----------------+
 ED INFORMATION EXCHANGE (2019  3:21 PM)Only the most recent of 3 results within the  period is included.
 
+------------------------------------------------------------------------+--------------+
| Narrative                                                              | Performed At |
+------------------------------------------------------------------------+--------------+
|   [id number]     Criteria Met         Care        |   ED         |
| Guidelines      10 in 12     Security and Safety  No recent Security   | INFORMATION  |
| Events currently on file     ED Care Guidelines from [facility] -        | EXCHANGE     |
| Antonella  Last Updated: 18 10:16 AM         Other Information:    |              |
| Currently being seen by [facility] in [place] for mental health        |              |
| [phone]  These are guidelines and the provider should    |              |
| exercise clinical judgment when providing care.  ED Care Guidelines    |              |
| from [facility]  Last Updated: 17 10:44 AM         Care |              |
|  Coordination:  This patient has been identified as having at          |              |
| leastEmergency Departments visits in the 12 months immediately         |              |
| preceding the date these guidelines were entered.Patient requires      |              |
| education on appropriate ED usage.Emphasize the importance of using    |              |
| outpatient   medical services for the treatment of chronic conditions. |              |
|   Please contact Community Health WorkerWillard at [phone] if   |              |
| patient is seen in ED.  These are guidelines and the provider should   |              |
| exercise clinical judgment when providing care.  These are guidelines  |              |
| and the provider should exercise clinical judgment when providing      |              |
| care.           Prescription Drug Report (12 Mo.)  PDMP query found no |              |
|  report.        E.D. Visit Count (12 mo.)  Facility Visits Low Acuity  |              |
|   [facility] 18 0   [facility] 2 0   [facility]   |              |
| [facility] 5 0   Total 25 0   Note: Visits indicate total |              |
|  known visits. Medicaid Low Acuity Dx are the number of primary        |              |
| diagnoses on the Medicaid's Low Acuity dx list.         Recent         |              |
| Emergency Department Visit Summary  Showing 10 most recent visits out  |              |
| of 25 in the past 12 months  Date Facility City State Type Diagnoses   |              |
| or Chief Complaint   2019 [facility] [place]       |              |
| Emergency       [facility] [place] [place]            |              |
| Emergency          Multiple Falls        Referral        Circulatory   |              |
| Problem      2019 [facility] [place] Emergency      |              |
|      ABD PAIN        Other chest pain      2019 [facility]         |              |
| [facility] [place] Emergency          Foot Pain      2019 |              |
[facility] [place] [facility]          Chest Pain          |              |
| Nausea        Shortness of Breath        Chest pain, unspecified       |              |
|      Elevated blood-pressure reading, without diagnosis of             |              |
| hypertension        Presence of aortocoronary bypass graft             |              |
| Other specified postprocedural states      2019 [facility]  |              |
| Health [place] Emergency          CHEST PAIN        Abnormal levels  |              |
| of other serum enzymes        Personal history of other diseases of    |              |
| the circulatory system        Chest pain, unspecified      2019 |              |
|  [facility] [place] Emergency          FISTULA BLEEDING    |              |
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
| grafts, initial encounter      Dec 22, 2018 [facility]       |              |
| [place] Emergency          CHEST PAIN        Type 2 diabetes         |              |
| mellitus with hyperglycemia        Chest pain, unspecified      Nov    |              |
 
2018 [facility] [place] Emergency    Chief Complaint:   |              |
| SOB      2018 [facility] [place] Emergency         |              |
|     FALL        Unspecified fall, initial encounter        Dependence  |              |
| on renal dialysis        End stage renal disease            Recent     |              |
| Inpatient Visit Summary  Showing 10 most recent visits out of 11 in    |              |
| the past 12 months  Date Facility City State Type Diagnoses or Chief   |              |
| Complaint   [facility]       |              |
| Surgery          Foot Pain        End stage renal disease              |              |
| Dependence on renal dialysis        Peripheral vascular disease,       |              |
| unspecified        Anemia in chronic kidney disease        Other       |              |
| disorders of plasma-protein metabolism, not elsewhere classified       |              |
|      Other specified personal risk factors, not elsewhere classified   |              |
|     Dec [facility], 2018 [facility]               |              |
|     Peripheral arterial disease, osteomyelitis left foot        Other  |              |
| acute osteomyelitis, left ankle and foot        Long term (current)    |              |
| use of antibiotics        End stage renal disease        Anemia in     |              |
| chronic kidney disease      Dec 5, 2018 [facility] |              |
|  General Medicine          Peripheral vascular disease, unspecified    |              |
|      End stage renal disease        Dependence on renal dialysis       |              |
|      Long term (current) use of insulin        Other chronic           |              |
| osteomyelitis, left ankle and foot        Type 2 diabetes mellitus     |              |
| with diabetic chronic kidney disease        Essential (primary)        |              |
| hypertension      2018 [facility]          |              |
| Inpatient          Upper GIB        Other chronic osteomyelitis,       |              |
| unspecified ankle and foot        End stage renal disease        Other |              |
|  specified personal risk factors, not elsewhere classified             |              |
| Chronic systolic (congestive) heart failure        Anemia in chronic   |              |
| kidney disease        Other disorders of plasma-protein metabolism,    |              |
| not elsewhere classified        Moderate protein-calorie malnutrition  |              |
|        Other disorders of phosphorus metabolism      2018      |              |
| [facility]. [place] Medical Surgical          1. Type 2      |              |
| diabetes mellitus with other specified complication        2. Urinary  |              |
| tract infection, site not specified        3. Unspecified              |              |
| protein-calorie malnutrition        4. Other chronic osteomyelitis,    |              |
| unspecified site        5. Hypertensive heart and chronic kidney       |              |
| disease with heart failure and with stage 5 chronic kidney disease, or |              |
|  end stage renal disease        6. Chronic diastolic (congestive)      |              |
| heart failure        7. Other osteomyelitis, ankle and foot        8.  |              |
| Type 2 diabetes mellitus with foot ulcer        9. Atherosclerotic     |              |
| heart disease of native coronary artery without angina pectoris        |              |
|  10. Chronic obstructive pulmonary disease, unspecified      ,    |              |
|  [facility] [place] [facility]          1. Benign |              |
|  paroxysmal vertigo, unspecified ear        2. End stage renal disease |              |
|         3. Hypertensive chronic kidney disease with stage 5 chronic    |              |
| kidney disease or end stage renal disease        4. Urinary tract      |              |
| infection, site not specified        5. Hypertensive heart and chronic |              |
|  kidney disease with heart failure and with stage 5 chronic kidney     |              |
| disease, or end stage renal disease        6. Kidney transplant status |              |
|         7. Unspecified systolic (congestive) heart failure        8.   |              |
| Syncope and collapse        9. Type 2 diabetes mellitus with diabetic  |              |
| chronic kidney disease        10. Atherosclerotic heart disease of     |              |
| native coronary artery without angina pectoris      Sep 15, 2018       |              |
| [facility] [place] Medical Surgical          Acute     |              |
| ischemic heart disease, unspecified        Long term (current) use of  |              |
| insulin        Dependence on renal dialysis        End stage renal     |              |
| disease        Type 2 diabetes mellitus with diabetic chronic kidney   |              |
| disease        Essential (primary) hypertension        Anemia in       |              |
| chronic kidney disease      2018 [facility] [place] [facility]
|  [facility]          0. Cough        1. Pneumonia due to         |              |
| Pseudomonas        2. End stage renal disease        3. Hypertensive   |              |
 
| heart and chronic kidney disease with heart failure and with stage 5   |              |
| chronic kidney disease, or end stage renal disease        4. Cachexia  |              |
|        5. Chronic obstructive pulmonary disease with acute lower       |              |
| respiratory infection        6. Type 2 diabetes mellitus with diabetic |              |
|  chronic kidney disease        7. Heart failure, unspecified        8. |              |
|  Hyperlipidemia, unspecified        9. Gastro-esophageal reflux        |              |
| disease without esophagitis      2018 [facility]       [facility]              |
| [facility]          0. Other chest pain        1.      |              |
| Gastro-esophageal reflux disease without esophagitis        2. End     |              |
| stage renal disease        3. Hypertensive heart and chronic kidney    |              |
| disease with heart failure and with stage 5 chronic kidney disease, or |              |
|  end stage renal disease        4. Chronic systolic (congestive) heart |              |
|  failure        5. Atherosclerotic heart disease of native coronary    |              |
| artery with other forms of angina pectoris        6. Type 2 diabetes   |              |
| mellitus with diabetic chronic kidney disease        7.                |              |
| Hyperlipidemia, unspecified        8. Major depressive disorder,       |              |
| single episode, unspecified        9. Chronic obstructive pulmonary    |              |
| disease, unspecified      Mar 6, 2018 [facility]     |              |
| [facility] Cardiology          Heart Failure        Need for iv access  |              |
|        Type 2 diabetes mellitus with other specified complication      |              |
|      Long term (current) use of insulin        Paroxysmal atrial       |              |
| fibrillation        Anemia in chronic kidney disease                   |              |
| Atherosclerotic heart disease of native coronary artery without angina |              |
|  pectoris        Cardiomyopathy, unspecified        End stage renal    |              |
| disease        Other specified postprocedural states            Care   |              |
| Providers  Provider PRC Type Phone Fax Service Dates   HEADINGS, SANTIAGO, |              |
|  F.N.P. Nurse Practitioner: Family     Current      [facility] Antonio Martinez     |              |
| /Care Coordinator [phone]    2019 -          |              |
| Current      [facility] Primary [facility] [phone]    2019 |              |
|  - Current      [facility] Primary            |              |
| Care    [phone] Current      [facility]      |              |
| [facility] [facility]     Current      MANOLO GONZALEZ Primary Care         |              |
| Current      [facility] Provider [phone] |              |
|  [phone] Current      [facility] Case or Care Manager     Current      |              |
|  MIRTA Goel Case or Care Manager [phone]  |              |
| [phone] Current      Jairo Gutierrez MD Other     Current     |              |
|      [employer] Portal  This patient has registered at the [facility]      |              |
| [facility] Emergency Department   For more information    |              |
| visit:                                                                 |              |
| [email or web address] |              |
| [id number]   The above information is provided for the sole purpose of     |              |
| patient treatment. Use of this information beyond the terms of Data    |              |
| Sharing Memorandum of Understanding and License Agreement is           |              |
| prohibited. In certain cases not all visits may be represented.        |              |
| Consult the aforementioned facilities for additional information.      |              |
| 2019 [employer] - [place], [place] -      |              |
| [email or web address]                                         |              |
+------------------------------------------------------------------------+--------------+
 
 
 
+-------------------------------------------------------------------------------------------
--------------------------------------------------------------------------------------------
--------------------+
| Procedure Note                                                                            
                                                                                            
                    |
+-------------------------------------------------------------------------------------------
--------------------------------------------------------------------------------------------
--------------------+
 
|   [place] - 2019  3:22 PM PST  Formatting of this note may be different      
                                                                                            
                    |
| from the original.[id number] [id number] Met  Care Guidelines  10 in     
                                                                                            
                    |
| 12Security and SafetyNo recent Security Events currently on fileED Care Guidelines from   
                                                                                            
                    |
| [facility] Updated: 18 10:16 AM  Other Information:Currently being      
                                                                                            
                    |
| seen by [facility] in [place] for mental health concerns.[phone] are            
                                                                                            
                    |
| guidelines and the provider should exercise clinical judgment when providing care.ED      
                                                                                            
                    |
| Care Guidelines from [facility] Updated: 17 10:44 AM  Care             
                                                                                            
                    |
| Coordination:This patient has been identified as having at leastEmergency Departments     
                                                                                            
                    |
| visits in the 12 months immediately preceding the date these guidelines were              
                                                                                            
                    |
| entered.Patient requires education on appropriate ED usage.Emphasize the importance of    
                                                                                            
                    |
| using outpatient medical services for the treatment of chronic conditions.Please contact  
                                                                                            
                    |
|  Community Health WorkerWillard at [phone] if patient is seen in ED.These are      
                                                                                            
                    |
| guidelines and the provider should exercise clinical judgment when providing care.These   
                                                                                            
                    |
| are guidelines and the provider should exercise clinical judgment when providing          
                                                                                            
                    |
| care.Prescription Drug Report (12 Mo.)PDMP query found no report.E.D. Visit Count (12     
                                                                                            
                    |
| mo.)Facility Visits Low Acuity Factual 18 0 [facility] 2 0    
                                                                                            
                    |
| [facility] 5 0 Total 25 0 Note: Visits indicate total known visits.   
                                                                                            
                    |
| Medicaid Low Acuity Dx are the number of primary diagnoses on the Medicaid's Low Acuity   
                                                                                            
                    |
| dx list.  Recent Emergency Department Visit SummaryShowing 10 most recent visits out of   
                                                                                            
                    |
| 25 in the past 12 monthsDate Facility [facility] State Type Diagnoses or Chief Complaint Feb    
                                                                                            
                    |
 
| 2019 [facility] [place] Emergency   2019 [facility]     
                                                                                            
                    |
| [facility] Emergency    Multiple Falls    Referral    Circulatory Problem  2019     
                                                                                            
                    |
| [email or web address] [place] Emergency    ABD PAIN    Other chest pain  2019    
                                                                                            
                    |
| [facility] [place] Emergency    Foot Pain  2019 [facility] [place]  
                                                                                            
                    |
|  [facility] Emergency    Chest Pain    Nausea    Shortness of Breath    Chest pain,        
                                                                                            
                    |
| unspecified    Elevated blood-pressure reading, without diagnosis of hypertension         
                                                                                            
                    |
| Presence of aortocoronary bypass graft    Other specified postprocedural states  ,  
                                                                                            
                    |
|   [facility] [place] Emergency    CHEST PAIN    Abnormal levels of other  
                                                                                            
                    |
|  serum enzymes    Personal history of other diseases of the circulatory system    Chest   
                                                                                            
                    |
| pain, unspecified  2019 [facility] [place] Emergency    FISTULA        
                                                                                            
                    |
| BLEEDING    Hemorrhage due to vascular prosthetic devices, implants and grafts, initial   
                                                                                            
                    |
| encounter  Dec 22, 2018 [facility] [place] Emergency    CHEST PAIN    Type 2  
                                                                                            
                    |
|  diabetes mellitus with hyperglycemia    Chest pain, unspecified  2018 [facility]      
                                                                                            
                    |
| [facility] [place] Emergency  Chief Complaint: SOB  2018 [facility]       
                                                                                            
                    |
| [facility] [place] Emergency    FALL    Unspecified fall, initial encounter    Dependence   
                                                                                            
                    |
| on renal dialysis    End stage renal disease  Recent Inpatient Visit SummaryShowing 10    
                                                                                            
                    |
| most recent visits out of 11 in the past 12 monthsDate Facility City State Type           
                                                                                            
                    |
| Diagnoses or Chief Complaint 2019 [facility] [place] Vascular         
                                                                                            
                    |
| Surgery    Foot Pain    End stage renal disease    Dependence on renal dialysis           
                                                                                            
                    |
| Peripheral vascular disease, unspecified    Anemia in chronic kidney disease    Other     
                                                                                            
                    |
 
| disorders of plasma-protein metabolism, not elsewhere classified    Other specified       
                                                                                            
                    |
| personal risk factors, not elsewhere classified  Dec 27, 2018 [facility]        
                                                                                            
                    |
| [facility]    Peripheral arterial disease, osteomyelitis left foot    Other       
                                                                                            
                    |
| acute osteomyelitis, left ankle and foot    Long term (current) use of antibiotics        
                                                                                            
                    |
| End stage renal disease    Anemia in chronic kidney disease  Dec 5, 2018 [facility]  
                                                                                            
                    |
  [facility]    Peripheral vascular disease, unspecified    End       
                                                                                            
                    |
| stage renal disease    Dependence on renal dialysis    Long term (current) use of         
                                                                                            
                    |
| insulin    Other chronic osteomyelitis, left ankle and foot    Type 2 diabetes mellitus   
                                                                                            
                    |
| with diabetic chronic kidney disease    Essential (primary) hypertension  2018    
                                                                                            
                    |
| [facility]    Upper GIB    Other chronic osteomyelitis,     
                                                                                            
                    |
| unspecified ankle and foot    End stage renal disease    Other specified personal risk    
                                                                                            
                    |
| factors, not elsewhere classified    Chronic systolic (congestive) heart failure          
                                                                                            
                    |
| Anemia in chronic kidney disease    Other disorders of plasma-protein metabolism, not     
                                                                                            
                    |
| elsewhere classified    Moderate protein-calorie malnutrition    Other disorders of       
                                                                                            
                    |
| phosphorus metabolism  [facility]    1.  
                                                                                            
                    |
|  Type 2 diabetes mellitus with other specified complication    2. Urinary tract           
                                                                                            
                    |
| infection, site not specified    3. Unspecified protein-calorie malnutrition    4. Other  
                                                                                            
                    |
|  chronic osteomyelitis, unspecified site    5. Hypertensive heart and chronic kidney      
                                                                                            
                    |
| disease with heart failure and with stage 5 chronic kidney disease, or end stage renal    
                                                                                            
                    |
| disease    6. Chronic diastolic (congestive) heart failure    7. Other osteomyelitis,     
                                                                                            
                    |
 
| ankle and foot    8. Type 2 diabetes mellitus with foot ulcer    9. Atherosclerotic       
                                                                                            
                    |
| heart disease of native coronary artery without angina pectoris    10. Chronic            
                                                                                            
                    |
| obstructive pulmonary disease, unspecified  2018 [facility]. [place]     
                                                                                            
                    |
| Medical Surgical    1. Benign paroxysmal vertigo, unspecified ear    2. End stage renal   
                                                                                            
                    |
| disease    3. Hypertensive chronic kidney disease with stage 5 chronic kidney disease or  
                                                                                            
                    |
|  end stage renal disease    4. Urinary tract infection, site not specified    5.          
                                                                                            
                    |
| Hypertensive heart and chronic kidney disease with heart failure and with stage 5         
                                                                                            
                    |
| chronic kidney disease, or end stage renal disease    6. Kidney transplant status    7.   
                                                                                            
                    |
| Unspecified systolic (congestive) heart failure    8. Syncope and collapse    9. Type 2   
                                                                                            
                    |
| diabetes mellitus with diabetic chronic kidney disease    10. Atherosclerotic heart       
                                                                                            
                    |
| disease of native coronary artery without angina pectoris  Sep 15, 2018 [facility]    
                                                                                            
                    |
| [place] Viv[place]. [place] Medical Surgical    Acute ischemic heart disease, unspecified         
                                                                                            
                    |
| Long term (current) use of insulin    Dependence on renal dialysis    End stage renal     
                                                                                            
                    |
| disease    Type 2 diabetes mellitus with diabetic chronic kidney disease    Essential     
                                                                                            
                    |
| (primary) hypertension    Anemia in chronic kidney disease  2018 [facility]            
                                                                                            
                    |
| [place]. [place] Medical Surgical    0. Cough    1. Pneumonia due to Pseudomonas   
                                                                                            
                    |
|    2. End stage renal disease    3. Hypertensive heart and chronic kidney disease with    
                                                                                            
                    |
| heart failure and with stage 5 chronic kidney disease, or end stage renal disease    4.   
                                                                                            
                    |
| Cachexia    5. Chronic obstructive pulmonary disease with acute lower respiratory         
                                                                                            
                    |
| infection    6. Type 2 diabetes mellitus with diabetic chronic kidney disease    7.       
                                                                                            
                    |
 
| Heart failure, unspecified    8. Hyperlipidemia, unspecified    9. Gastro-esophageal      
                                                                                            
                    |
| reflux disease without esophagitis  2018 [facility] [place]. [place] Medical     
                                                                                            
                    |
| Surgical    0. Other chest pain    1. Gastro-esophageal reflux disease without            
                                                                                            
                    |
| esophagitis    2. End stage renal disease    3. Hypertensive heart and chronic kidney     
                                                                                            
                    |
| disease with heart failure and with stage 5 chronic kidney disease, or end stage renal    
                                                                                            
                    |
| disease    4. Chronic systolic (congestive) heart failure    5. Atherosclerotic heart     
                                                                                            
                    |
| disease of native coronary artery with other forms of angina pectoris    6. Type 2        
                                                                                            
                    |
| diabetes mellitus with diabetic chronic kidney disease    7. Hyperlipidemia, unspecified  
                                                                                            
                    |
|     8. Major depressive disorder, single episode, unspecified    9. Chronic obstructive   
                                                                                            
                    |
| pulmonary disease, unspecified  Mar 6, 2018 [facility] JANEEN [place]. [place]        
                                                                                            
                    |
| Cardiology    Heart Failure    Need for iv access    Type 2 diabetes mellitus with other  
                                                                                            
                    |
|  specified complication    Long term (current) use of insulin    Paroxysmal atrial        
                                                                                            
                    |
| fibrillation    Anemia in chronic kidney disease    Atherosclerotic heart disease of      
                                                                                            
                    |
| native coronary artery without angina pectoris    Cardiomyopathy, unspecified    End      
                                                                                            
                    |
| stage renal disease    Other specified postprocedural states  Care ProvidersProvider [facility]  
                                                                                            
                    |
|  Type Phone Fax Service Dates HEADINGS, ELZA HENDERSON Nurse Practitioner: Family           
                                                                                            
                    |
| Current  Marco Antonio Martinez /Care Coordinator [phone]  2019 -       
                                                                                            
                    |
| Current  [facility]co Antonio Martinez [facility] [phone]  2019 - Current  [facility]        
                                                                                            
                    |
| [facility] Primary [facility]  [phone] Current  [facility]  
                                                                                            
                    |
|  [facility] Primary Care   Current  MANOLO GONZALEZ Primary Care   Current  [employer],    
                                                                                            
                    |
 
| [facility] Provider [phone] Current  [employer] Case or Care  
                                                                                            
                    |
|  Manager   Current  MIRTA Goel Case or Care Manager [phone]  
                                                                                            
                    |
|  [phone] Current  Jairo Gutierrez MD Other   Current  [employer]      
                                                                                            
                    |
| patient has registered at the [facility] Emergency Department For     
                                                                                            
                    |
| more information visit:                                                                   
                                                                                            
                    |
| [email or web address] The above    
                                                                                            
                    |
| information is provided for the sole purpose of patient treatment. Use of this            
                                                                                            
                    |
| information beyond the terms of Data Sharing Memorandum of Understanding and License      
                                                                                            
                    |
| Agreement is prohibited. In certain cases not all visits may be represented. Consult the  
                                                                                            
                    |
|  aforementioned facilities for additional information. 2019 [employer]            
                                                                                            
                    |
| [employer] - [place], [place] - [email or web address]                  
                                                                                            
                    |
|   End stage renal disease                                                                 
                                                                                            
                    |
|   Dependence on renal dialysis                                                            
                                                                                            
                    |
|   Long term (current) use of insulin                                                      
                                                                                            
                    |
|   Other chronic osteomyelitis, left ankle and foot                                        
                                                                                            
                    |
|   Type 2 diabetes mellitus with diabetic chronic kidney disease                           
                                                                                            
                    |
|   Essential (primary) hypertension                                                        
                                                                                            
                    |
|                                                                                           
                                                                                            
                    |
|[facility] [place] [facility]                                      
                                                                                            
                    |
|  Upper GIB                                                                                
                                                                                            
                    |
 
|   Other chronic osteomyelitis, unspecified ankle and foot                                 
                                                                                            
                    |
|   End stage renal disease                                                                 
                                                                                            
                    |
|   Other specified personal risk factors, not elsewhere classified                         
                                                                                            
                    |
|   Chronic systolic (congestive) heart failure                                             
                                                                                            
                    |
|   Anemia in chronic kidney disease                                                        
                                                                                            
                    |
|   Other disorders of plasma-protein metabolism, not elsewhere classified                  
                                                                                            
                    |
|   Moderate protein-calorie malnutrition                                                   
                                                                                            
                    |
|   Other disorders of phosphorus metabolism                                                
                                                                                            
                    |
|                                                                                           
                                                                                            
                    |
|2018 [place] [place]. [place] Medical Surgical                                
                                                                                            
                    |
|  1. Type 2 diabetes mellitus with other specified complication                            
                                                                                            
                    |
|   2. Urinary tract infection, site not specified                                          
                                                                                            
                    |
|   3. Unspecified protein-calorie malnutrition                                             
                                                                                            
                    |
|   4. Other chronic osteomyelitis, unspecified site                                        
                                                                                            
                    |
|   5. 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
onic kidney disease, or end stage renal disease                                             
                    |
|   6. Chronic diastolic (congestive) heart failure                                         
                                                                                            
                    |
|   7. Other osteomyelitis, ankle and foot                                                  
                                                                                            
                    |
|   8. Type 2 diabetes mellitus with foot ulcer                                             
                                                                                            
                    |
|   9. Atherosclerotic heart disease of native coronary artery without angina pectoris      
                                                                                            
                    |
|   10. Chronic obstructive pulmonary disease, unspecified                                  
                                                                                            
                    |
 
|                                                                                           
                                                                                            
                    |
|2018 [place] [place]. [place] [facility]                                 
                                                                                            
                    |
|  1. Benign paroxysmal vertigo, unspecified ear                                            
                                                                                            
                    |
|   2. End stage renal disease                                                              
                                                                                            
                    |
|   3. Hypertensive chronic kidney disease with stage 5 chronic kidney disease or end stage 
renal disease                                                                               
                    |
|   4. Urinary tract infection, site not specified                                          
                                                                                            
                    |
|   5. 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
onic kidney disease, or end stage renal disease                                             
                    |
|   6. Kidney transplant status                                                             
                                                                                            
                    |
|   7. Unspecified systolic (congestive) heart failure                                      
                                                                                            
                    |
|   8. Syncope and collapse                                                                 
                                                                                            
                    |
|   9. Type 2 diabetes mellitus with diabetic chronic kidney disease                        
                                                                                            
                    |
|   10. Atherosclerotic heart disease of native coronary artery without angina pectoris     
                                                                                            
                    |
|                                                                                           
                                                                                            
                    |
|Sep 15, 2018 [facility] JANEEN Vivar. [place] Medical Surgical                           
                                                                                            
                    |
|  Acute ischemic heart disease, unspecified                                                
                                                                                            
                    |
|   Long term (current) use of insulin                                                      
                                                                                            
                    |
|   Dependence on renal dialysis                                                            
                                                                                            
                    |
|   End stage renal disease                                                                 
                                                                                            
                    |
|   Type 2 diabetes mellitus with diabetic chronic kidney disease                           
                                                                                            
                    |
|   Essential (primary) hypertension                                                        
                                                                                            
                    |
 
|   Anemia in chronic kidney disease                                                        
                                                                                            
                    |
|                                                                                           
                                                                                            
                    |
|2018 [facility] [place]. [place] Medical Surgical                                
                                                                                            
                    |
|  0. Cough                                                                                 
                                                                                            
                    |
|   1. Pneumonia due to Pseudomonas                                                         
                                                                                            
                    |
|   2. End stage renal disease                                                              
                                                                                            
                    |
|   3. 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
onic kidney disease, or end stage renal disease                                             
                    |
|   4. Cachexia                                                                             
                                                                                            
                    |
|   5. Chronic obstructive pulmonary disease with acute lower respiratory infection         
                                                                                            
                    |
|   6. Type 2 diabetes mellitus with diabetic chronic kidney disease                        
                                                                                            
                    |
|   7. Heart failure, unspecified                                                           
                                                                                            
                    |
|   8. Hyperlipidemia, unspecified                                                          
                                                                                            
                    |
|   9. Gastro-esophageal reflux disease without esophagitis                                 
                                                                                            
                    |
|                                                                                           
                                                                                            
                    |
|2018 [facility]. [place] [facility]                                 
                                                                                            
                    |
|  0. Other chest pain                                                                      
                                                                                            
                    |
|   1. Gastro-esophageal reflux disease without esophagitis                                 
                                                                                            
                    |
|   2. End stage renal disease                                                              
                                                                                            
                    |
|   3. 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
onic kidney disease, or end stage renal disease                                             
                    |
|   4. Chronic systolic (congestive) heart failure                                          
                                                                                            
                    |
 
|   5. 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
ctoris                                                                                      
                    |
|   6. Type 2 diabetes mellitus with diabetic chronic kidney disease                        
                                                                                            
                    |
|   7. Hyperlipidemia, unspecified                                                          
                                                                                            
                    |
|   8. Major depressive disorder, single episode, unspecified                               
                                                                                            
                    |
|   9. Chronic obstructive pulmonary disease, unspecified                                   
                                                                                            
                    |
|                                                                                           
                                                                                            
                    |
|Mar 6, 2018 [facility]. [place] [facility]                              
                                                                                            
                    |
|  Heart Failure                                                                            
                                                                                            
                    |
|   Need for iv access                                                                      
                                                                                            
                    |
|   Type 2 diabetes mellitus with other specified complication                              
                                                                                            
                    |
|   Long term (current) use of insulin                                                      
                                                                                            
                    |
|   Paroxysmal atrial fibrillation                                                          
                                                                                            
                    |
|   Anemia in chronic kidney disease                                                        
                                                                                            
                    |
|   Atherosclerotic heart disease of native coronary artery without angina pectoris         
                                                                                            
                    |
|   Cardiomyopathy, unspecified                                                             
                                                                                            
                    |
|   End stage renal disease                                                                 
                                                                                            
                    |
|   Other specified postprocedural states                                                   
                                                                                            
                    |
|                                                                                           
                                                                                            
                    |
|                                                                                           
                                                                                            
                    |
|                                                                                           
                                                                                            
                    |
 
|Care Providers                                                                             
                                                                                            
                    |
|Provider PRC Type Phone Fax Service Dates                                                  
                                                                                            
                    |
|HEADINGS, SANTIAGO F.N.P. Nurse Practitioner: Family   Current                                
                                                                                            
                    |
|Marco Antonio Martinez /Care Coordinator [phone]  2019 - Current         
                                                                                            
                    |
|[facility] [phone]  2019 - Current                          
                                                                                            
                    |
|[facility]  [phone] Current                   
                                                                                            
                    |
|[facility] Primary Care   Current                                
                                                                                            
                    |
|[facility] GONZALEZ Primary Care   Current                                                        
                                                                                            
                    |
|[facility] [phone] Current                 
                                                                                            
                    |
|[facility] Case or Care Manager   Current                                                   
                                                                                            
                    |
|MIRTA Goel Case or Care Manager [phone] Current
                                                                                            
                    |
|Jairo Gutierrez MD Other   Current                                                       
                                                                                            
                    |
|                                                                                           
                                                                                            
                    |
|Collective Portal                                                                          
                                                                                            
                    |
|This patient has registered at the [facility] Emergency Department     
                                                                                            
                    |
|For more information visit: [email or web address]
[email or web address]                                                                               
                    |
|The above information is provided for the sole purpose of patient treatment. Use of this in
formation beyond the terms of Data Sharing Memorandum of Understanding and License Agreement
 is prohibited. In  |
|certain cases not all visits may be represented. Consult the aforementioned facilities for 
additional information.                                                                     
                    |
|2019 [employer] - [place], [place] - [email or web address]                                                                                       
                    |
+-------------------------------------------------------------------------------------------
--------------------------------------------------------------------------------------------
--------------------+
 
 
 
 
+-------------------+---------+--------------------+--------------+
| Performing        | Address | City/State/Zipcode | Phone Number |
| Organization      |         |                    |              |
+-------------------+---------+--------------------+--------------+
|   ED INFORMATION  |         |                    |              |
| EXCHANGE          |         |                    |              |
+-------------------+---------+--------------------+--------------+
 Phosphorus (2019  5:38 AM)
 
+------------+--------------------------+-----------------+--------------+
| Component  | Value                    | Ref Range       | Performed At |
+------------+--------------------------+-----------------+--------------+
| PHOSPHORUS | 3.6Comment: Testing      | 2.3 - 4.8 mg/dL | [facility]   |
|            | performed at [facility], [address] |                 | LABORATORY   |
|            |  [address] [facility],        |                 |              |
|            | [place]  [address]     |                 |              |
+------------+--------------------------+-----------------+--------------+
 
 
 
+----------+
| Specimen |
+----------+
| Blood    |
+----------+
 
 
 
+---------------+-------------------------+---------------------+----------------+
| Performing    | Address                 | City/State/Zipcode  | Phone Number   |
| Organization  |                         |                     |                |
+---------------+-------------------------+---------------------+----------------+
|   [facility]  |   [address] [place] [address]  | [place] [address] |   [phone] |
| LABORATORY    | [address]vd[address]                   |                     |                |
+---------------+-------------------------+---------------------+----------------+
 Magnesium (2019  5:38 AM)
 
+-----------+--------------------------+-----------------+--------------+
| Component | Value                    | Ref Range       | Performed At |
+-----------+--------------------------+-----------------+--------------+
| MAGNESIUM | 2.3Comment: Testing      | 1.7 - 2.4 mg/dL | [facility]   |
|           | performed at [facility], [facility] [address] |                 | LABORATORY   |
|           |  [facility],        |                 |              |
|           | [place]  [address]     |                 |              |
+-----------+--------------------------+-----------------+--------------+
 
 
 
+----------+
| Specimen |
+----------+
| Blood    |
+----------+
 
 
 
+---------------+-------------------------+---------------------+----------------+
 
| Performing    | Address                 | City/State/Zipcode  | Phone Number   |
| Organization  |                         |                     |                |
+---------------+-------------------------+---------------------+----------------+
|   [facility]  |   [address]  | [place] [address] |   [phone] |
| LABORATORY    | Blvd[address]                   |                     |                |
+---------------+-------------------------+---------------------+----------------+
 Basic metabolic panel (2019  5:38 AM)Only the most recent of 2 results within the rebekah
e period is included.
 
+----------------+--------------------------+-------------------+--------------+
| Component      | Value                    | Ref Range         | Performed At |
+----------------+--------------------------+-------------------+--------------+
| SODIUM         | 139                      | 135 - 145 mmol/L  | [facility]   |
|                |                          |                   | LABORATORY   |
+----------------+--------------------------+-------------------+--------------+
| POTASSIUM      | 3.9                      | 3.5 - 4.9 mmol/L  | [facility]   |
|                |                          |                   | LABORATORY   |
+----------------+--------------------------+-------------------+--------------+
| CHLORIDE       | 97 (L)                   | 99 - 109 mmol/L   | [facility]   |
|                |                          |                   | LABORATORY   |
+----------------+--------------------------+-------------------+--------------+
| CO2            | 31                       | 23 - 32 mmol/L    | [facility]   |
|                |                          |                   | LABORATORY   |
+----------------+--------------------------+-------------------+--------------+
| ANION GAP AGAP | 15                       | 5 - 20 mmol/L     | [facility]   |
|                |                          |                   | LABORATORY   |
+----------------+--------------------------+-------------------+--------------+
| GLUCOSE        | 169 (H)                  | 65 - 99 mg/dL     | [facility]   |
|                |                          |                   | LABORATORY   |
+----------------+--------------------------+-------------------+--------------+
| BUN            | 13                       | 8 - 25 mg/dL      | [facility]   |
|                |                          |                   | LABORATORY   |
+----------------+--------------------------+-------------------+--------------+
| CREATININE     | 4.8 (H)                  | 0.50 - 1.00 mg/dL | [facility]   |
|                |                          |                   | LABORATORY   |
+----------------+--------------------------+-------------------+--------------+
| BUN/CREAT      | 3                        |                   | [facility]   |
|                |                          |                   | LABORATORY   |
+----------------+--------------------------+-------------------+--------------+
| CALCIUM        | 9.4                      | 8.5 - 10.5 mg/dL  | [facility]   |
|                |                          |                   | LABORATORY   |
+----------------+--------------------------+-------------------+--------------+
| EGFR           | 9 (L)Comment: GFR <60:   | >60 mL/min/1.73m2 | [facility]   |
|                | CHRONIC KIDNEY DISEASE,  |                   | LABORATORY   |
|                | IF FOUND OVER A 3 MONTH  |                   |              |
|                | PERIOD.GFR <15: KIDNEY   |                   |              |
|                | FAILURE.FOR       |                   |              |
|                | [employer], MULTIPLY THE  |                   |              |
|                | CALCULATED GFR BY        |                   |              |
|                | 1.210.This eGFR is       |                   |              |
|                | calculated using the     |                   |              |
|                | MDRD IDMS traceable      |                   |              |
|                | equation.Testing         |                   |              |
|                | performed at [facility], [address] |                   |              |
|                |  [facility],        |                   |              |
|                | [place]    [address]   |                   |              |
+----------------+--------------------------+-------------------+--------------+
 
 
 
 
+----------+
| Specimen |
+----------+
| Blood    |
+----------+
 
 
 
+---------------+-------------------------+---------------------+----------------+
| Performing    | Address                 | City/State/Zipcode  | Phone Number   |
| Organization  |                         |                     |                |
+---------------+-------------------------+---------------------+----------------+
|   [facility]  |   [address]  | [place] [address] |   [phone] |
| LABORATORY    | [address]vd[address]                   |                     |                |
+---------------+-------------------------+---------------------+----------------+
 EKG STANDARD 12 LEAD (2019  6:18 AM)
 
+-------------------+--------------------------+-----------+--------------+
| Component         | Value                    | Ref Range | Performed At |
+-------------------+--------------------------+-----------+--------------+
| Ventricular Rate  | 73                       | BPM       | [facility] E[facility]     |
+-------------------+--------------------------+-----------+--------------+
| Atrial Rate       | 73                       | BPM       | [facility] E[facility]     |
+-------------------+--------------------------+-----------+--------------+
| P-R Interval      | 146                      | ms        | [facility]     |
+-------------------+--------------------------+-----------+--------------+
| QRS Duration      | 128                      | ms        | [facility]     |
+-------------------+--------------------------+-----------+--------------+
| Q-T Interval      | 464                      | ms        | [facility] E[facility]     |
+-------------------+--------------------------+-----------+--------------+
| QTC Calculation   | 511                      | ms        | [facility] E[facility]     |
| (Bezet)           |                          |           |              |
+-------------------+--------------------------+-----------+--------------+
| Calculated P Axis | 70                       | degrees   | [facility] EKG     |
+-------------------+--------------------------+-----------+--------------+
| Calculated R Axis | -38                      | degrees   | [facility] EKG     |
+-------------------+--------------------------+-----------+--------------+
| Calculated T Axis | 9                        | degrees   | [facility] E[facility]     |
+-------------------+--------------------------+-----------+--------------+
| Diagnosis         | Normal sinus             |           | [facility]     |
|                   | rhythmPossible Left      |           |              |
|                   | atrial enlargementLeft   |           |              |
|                   | axis                     |           |              |
|                   | deviationNon-specific    |           |              |
|                   | intra-ventricular        |           |              |
|                   | conduction blockCannot   |           |              |
|                   | rule out Septal infarct  |           |              |
|                   | , age                    |           |              |
|                   | undeterminedAbnormal     |           |              |
|                   | ECGWhen compared with    |           |              |
|                   | ECG of 2019       |           |              |
|                   | 05:50,Minimal criteria   |           |              |
|                   | for Septal infarct are   |           |              |
|                   | now PresentConfirmed by  |           |              |
|                   | EN FRANK ([id number]) on   |           |              |
|                   | 2019 12:03:10 PM    |           |              |
+-------------------+--------------------------+-----------+--------------+
 
 
 
 
+--------------+-------------------+--------------------+--------------+
| Performing   | Address           | City/State/Zipcode | Phone Number |
| Organization |                   |                    |              |
+--------------+-------------------+--------------------+--------------+
|   [facility]   |   [address] | [place] [address] |              |
+--------------+-------------------+--------------------+--------------+
 IR stent femoral popliteal (2019  6:45 PM)
 
+------------------------------------------------------------------------+--------------+
| Impressions                                                            | Performed At |
+------------------------------------------------------------------------+--------------+
|      1. Aorta and iliac arteries were calcified but without            |   [facility]     |
| significant stenosis.  2. Right SFA with high-grade stenosis           | RADIOLOGY    |
| proximally with good endograft results after angioplasty and stenting. |              |
|   3. 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
| artery to right foot.  4. Right posterior tibial artery was            |              |
| chronically occluded.     Electronically signed by Kitr Mclaughlin MD on    |              |
| 2019 3:50 PM                                                      |              |
+------------------------------------------------------------------------+--------------+
 
 
 
+------------------------------------------------------------------------+--------------+
| Narrative                                                              | Performed At |
+------------------------------------------------------------------------+--------------+
|   LOWER EXTREMITY ARTERIOGRAM, UNILATERAL     PREOPERATIVE             |   [facility]     |
| DIAGNOSIS:    Critical limb ischemia of right lower extremity with     | RADIOLOGY    |
| poorly healing wound of right great toe     POSTOPERATIVE              |              |
| DIAGNOSIS:    Same     PROCEDURE:  1. Moderate conscious sedation  2.  |              |
| Ultrasound guided access of the left common femoral artery  3.         |              |
| Aortogram  4. Selective right common femoral artery catheterization    |              |
| with right leg runoff  5. Right SFA stenting using 6 x 80 mm           |              |
| self-expanding stent  6 Drug eluting balloon angioplasty of right SFA  |              |
| using 4 x 100 mm Lutonix balloon     SURGEON: Kirt Mclaughlin MD              |              |
| ASSISTANT: None     ANESTHESIA: Moderate sedation and local anesthesia |              |
|      Informed consent was obtained from the patient. Continuous        |              |
| cardiac monitoring was performed throughout the procedure.  Conscious  |              |
| sedation was provided by the nursing staff during the procedure under  |              |
| my supervision.  Sedation time: 34 minutes  Medications: 2 mg Versed   |              |
| IV, 50 mcg Fentanyl IV     ESTIMATED BLOOD LOSS: Minimal               |              |
| CONTRAST:    53 mL of Isovue 250     INDICATIONS:    See preoperative  |              |
| history and physical     FINDINGS:    Aorta and iliac arteries         |              |
| calcified but without significant stenosis. Right SFA with high-grade  |              |
| stenosis proximally. After angioplasty and stenting, good angiographic |              |
|  results. Good two-vessel runoff to right foot via right anterior      |              |
| tibial artery and peroneal artery. Right posterior tibial artery was   |              |
| chronically occluded.     DESCRIPTION OF PROCEDURE:    The patient was |              |
|  properly identified and brought to the Cath Lab. The patient was      |              |
| placed supine on the catheter table and patient was given moderate     |              |
| sedation by nursing staff under my supervision. Patient's bilateral    |              |
| groins were then prepped and draped in the usual sterile fashion.      |              |
| Local anesthetic was given to the left groin and the left common       |              |
| femoral artery was accessed under ultrasound guidance using a          |              |
| micropuncture needle. A micropuncture sheath was inserted over a wire  |              |
| and up sized to a 4 French sheath. A Omni flush catheter was then      |              |
| inserted into the distal aorta and aortogram was then performed with   |              |
| the findings as described above. The Omni Flush catheter was then used |              |
|  to guide a Glidewire into the right common femoral artery and then    |              |
| the catheter was then advanced over the wire. A right lower extremity  |              |
| runoff was then performed with the findings as described above.        |              |
 
| Next, an Amplatzer wire was then inserted over the catheter and the 4  |              |
| French sheath was removed. A 6 French destination sheath was then      |              |
| inserted over the wire with the tip of the sheath being placed into    |              |
| the right common femoral artery. A vertebral catheter was inserted     |              |
| over the wire and the wire was then removed. A Glidewire was then      |              |
| placed into the vertebral catheter and used to cross through the       |              |
| stenotic right SFA.    Next, a 260 fix core wire was then inserted     |              |
| over the catheter.    Right SFA was stented using 6 x 80 mm            |              |
| self-expanding stent. Drug eluting balloon angioplasty of the right    |              |
| SFA was then performed using 4 x 100 mm Lutonix balloon.     A final   |              |
| arteriogram was then performed through the sheath. The destination     |              |
| sheath was then removed and a Mynx closure device was then used on the |              |
|  left common femoral artery and hemostasis was ensured. The patient    |              |
| was then taken to the observation unit for further monitoring.         |              |
+------------------------------------------------------------------------+--------------+
 
 
 
+-------------------------------------------------------------------------------------------
--------------------------------------------------------------------------------------------
-----------------------------------+
| Procedure Note                                                                            
                                                                                            
                                   |
+-------------------------------------------------------------------------------------------
--------------------------------------------------------------------------------------------
-----------------------------------+
|   Denny, Rad Results In - 2019  8:40 AM PST  LOWER EXTREMITY ARTERIOGRAM,             
                                                                                            
                                   |
| UNILATERALPREOPERATIVE DIAGNOSIS:  Critical limb ischemia of right lower extremity with   
                                                                                            
                                   |
| poorly healing wound of right great toePOSTOPERATIVE DIAGNOSIS:  SamePROCEDURE:1.         
                                                                                            
                                   |
| Moderate conscious sedation2. Ultrasound guided access of the left common femoral         
                                                                                            
                                   |
| artery3. Aortogram4. Selective right common femoral artery catheterization with right     
                                                                                            
                                   |
| leg runoff5. Right SFA stenting using 6 x 80 mm self-expanding stent6 Drug eluting        
                                                                                            
                                   |
| balloon angioplasty of right SFA using 4 x 100 mm Lutonix balloonSURGEON: Kirt Mclaughlin,        
                                                                                            
                                   |
| MDASSISTANT: NoneANESTHESIA: Moderate sedation and local anesthesiaInformed consent was   
                                                                                            
                                   |
| obtained from the patient. Continuous cardiac monitoring was performed throughout the     
                                                                                            
                                   |
| procedure.Conscious sedation was provided by the nursing staff during the procedure       
                                                                                            
                                   |
| under my supervision.Sedation time: 34 minutesMedications: 2 mg Versed IV, 50 mcg         
                                                                                            
                                   |
 
| Fentanyl IVESTIMATED BLOOD LOSS: MinimalCONTRAST:  53 mL of Isovue 250INDICATIONS:  See   
                                                                                            
                                   |
| preoperative history and physicalFINDINGS:  Aorta and iliac arteries calcified but        
                                                                                            
                                   |
| without significant stenosis. Right SFA with high-grade stenosis proximally. After        
                                                                                            
                                   |
| angioplasty and stenting, good angiographic results. Good two-vessel runoff to right      
                                                                                            
                                   |
| foot via right anterior tibial artery and peroneal artery. Right posterior tibial artery  
                                                                                            
                                   |
|  was chronically occluded.DESCRIPTION OF PROCEDURE:  The patient was properly identified  
                                                                                            
                                   |
|  and brought to the Cath Lab. The patient was placed supine on the catheter table and     
                                                                                            
                                   |
| patient was given moderate sedation by nursing staff under my supervision. Patient's      
                                                                                            
                                   |
| bilateral groins were then prepped and draped in the usual sterile fashion. Local         
                                                                                            
                                   |
| anesthetic was given to the left groin and the left common femoral artery was accessed    
                                                                                            
                                   |
| under ultrasound guidance using a micropuncture needle. A micropuncture sheath was        
                                                                                            
                                   |
| inserted over a wire and up sized to a 4 French sheath. A Omni flush catheter was then    
                                                                                            
                                   |
| inserted into the distal aorta and aortogram was then performed with the findings as      
                                                                                            
                                   |
| described above. The Omni Flush catheter was then used to guide a Glidewire into the      
                                                                                            
                                   |
| right common femoral artery and then the catheter was then advanced over the wire. A      
                                                                                            
                                   |
| right lower extremity runoff was then performed with the findings as described            
                                                                                            
                                   |
| above.Next, an Amplatzer wire was then inserted over the catheter and the 4 French        
                                                                                            
                                   |
| sheath was removed. A 6 French destination sheath was then inserted over the wire with    
                                                                                            
                                   |
| the tip of the sheath being placed into the right common femoral artery. A vertebral      
                                                                                            
                                   |
| catheter was inserted over the wire and the wire was then removed. A Glidewire was then   
                                                                                            
                                   |
 
| placed into the vertebral catheter and used to cross through the stenotic right SFA.      
                                                                                            
                                   |
| Next, a 260 fix core wire was then inserted over the catheter.  Right SFA was stented     
                                                                                            
                                   |
| using 6 x 80 mm self-expanding stent. Drug eluting balloon angioplasty of the right SFA   
                                                                                            
                                   |
| was then performed using 4 x 100 mm Lutonix balloon.A final arteriogram was then          
                                                                                            
                                   |
| performed through the sheath. The destination sheath was then removed and a Mynx closure  
                                                                                            
                                   |
|  device was then used on the left common femoral artery and hemostasis was ensured. The   
                                                                                            
                                   |
| patient was then taken to the observation unit for further monitoring.IMPRESSION:1.       
                                                                                            
                                   |
| Aorta and iliac arteries were calcified but without significant stenosis.2. Right SFA     
                                                                                            
                                   |
| with high-grade stenosis proximally with good endograft results after angioplasty and     
                                                                                            
                                   |
| stenting.3. Two-vessel runoff via right anterior tibial artery and peroneal artery to     
                                                                                            
                                   |
| right foot.4. 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       
                                                                                            
                                   |
| signed by Kirt Mclaughlin MD on 2019 3:50 PM                                              
                                                                                            
                                   |
|                                                                                           
                                                                                            
                                   |
|A final arteriogram was then performed through the sheath. The destination sheath was then 
removed and a Mynx closure device was then used on the left common femoral artery and hemost
asis was ensured. The patient was  |
|then taken to the observation unit for further monitoring.                                 
                                                                                            
                                   |
|                                                                                           
                                                                                            
                                   |
|IMPRESSION:                                                                                
                                                                                            
                                  |
|                                                                                           
                                                                                            
                                   |
|1. Aorta and iliac arteries were calcified but without significant stenosis.               
                                                                                            
                                   |
|2. Right SFA with high-grade stenosis proximally with good endograft results after angiopla
sty and stenting.                                                                           
                                   |
 
|3. Two-vessel runoff via right anterior tibial artery and peroneal artery to right foot.   
                                                                                            
                                   |
|4. 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                                 
                                                                                            
                                   |
|                                                                                           
                                                                                            
                                   |
|Electronically signed by Kirt Mclaughlin MD on 2019 3:50 PM                                
                                                                                            
                                   |
+-------------------------------------------------------------------------------------------
--------------------------------------------------------------------------------------------
-----------------------------------+
 
 
 
+--------------------+------------------+--------------------+--------------+
| Performing         | Address          | City/State/Zipcode | Phone Number |
| Organization       |                  |                    |              |
+--------------------+------------------+--------------------+--------------+
|   [facility] |   [address] | [place] [address] |              |
+--------------------+------------------+--------------------+--------------+
 IR angiogram extremity right (2019  6:45 PM)
 
+------------------------------------------------------------------------+--------------+
| Impressions                                                            | Performed At |
+------------------------------------------------------------------------+--------------+
|      1. Aorta and iliac arteries were calcified but without            |   [facility]     |
| significant stenosis.  2. Right SFA with high-grade stenosis           | RADIOLOGY    |
| proximally with good endograft results after angioplasty and stenting. |              |
|   3. 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
| artery to right foot.  4. Right posterior tibial artery was            |              |
| chronically occluded.     Electronically signed by Kirt Mclaughlin MD on    |              |
| 2019 3:50 PM                                                      |              |
+------------------------------------------------------------------------+--------------+
 
 
 
+------------------------------------------------------------------------+--------------+
| Narrative                                                              | Performed At |
+------------------------------------------------------------------------+--------------+
|   LOWER EXTREMITY ARTERIOGRAM, UNILATERAL     PREOPERATIVE             |   [facility]     |
| DIAGNOSIS:    Critical limb ischemia of right lower extremity with     | RADIOLOGY    |
| poorly healing wound of right great toe     POSTOPERATIVE              |              |
| DIAGNOSIS:    Same     PROCEDURE:  1. Moderate conscious sedation  2.  |              |
| Ultrasound guided access of the left common femoral artery  3.         |              |
| Aortogram  4. Selective right common femoral artery catheterization    |              |
| with right leg runoff  5. Right SFA stenting using 6 x 80 mm           |              |
| self-expanding stent  6 Drug eluting balloon angioplasty of right SFA  |              |
| using 4 x 100 mm Lutonix balloon     SURGEON: Kirt Mclaughlin MD              |              |
| ASSISTANT: None     ANESTHESIA: Moderate sedation and local anesthesia |              |
|      Informed consent was obtained from the patient. Continuous        |              |
| cardiac monitoring was performed throughout the procedure.  Conscious  |              |
| sedation was provided by the nursing staff during the procedure under  |              |
| my supervision.  Sedation time: 34 minutes  Medications: 2 mg Versed   |              |
| IV, 50 mcg Fentanyl IV     ESTIMATED BLOOD LOSS: Minimal               |              |
| CONTRAST:    53 mL of Isovue 250     INDICATIONS:    See preoperative  |              |
| history and physical     FINDINGS:    Aorta and iliac arteries         |              |
 
| calcified but without significant stenosis. Right SFA with high-grade  |              |
| stenosis proximally. After angioplasty and stenting, good angiographic |              |
|  results. Good two-vessel runoff to right foot via right anterior      |              |
| tibial artery and peroneal artery. Right posterior tibial artery was   |              |
| chronically occluded.     DESCRIPTION OF PROCEDURE:    The patient was |              |
|  properly identified and brought to the Cath Lab. The patient was      |              |
| placed supine on the catheter table and patient was given moderate     |              |
| sedation by nursing staff under my supervision. Patient's bilateral    |              |
| groins were then prepped and draped in the usual sterile fashion.      |              |
| Local anesthetic was given to the left groin and the left common       |              |
| femoral artery was accessed under ultrasound guidance using a          |              |
| micropuncture needle. A micropuncture sheath was inserted over a wire  |              |
| and up sized to a 4 French sheath. A Omni flush catheter was then      |              |
| inserted into the distal aorta and aortogram was then performed with   |              |
| the findings as described above. The Omni Flush catheter was then used |              |
|  to guide a Glidewire into the right common femoral artery and then    |              |
| the catheter was then advanced over the wire. A right lower extremity  |              |
| runoff was then performed with the findings as described above.        |              |
| Next, an Amplatzer wire was then inserted over the catheter and the 4  |              |
| French sheath was removed. A 6 French destination sheath was then      |              |
| inserted over the wire with the tip of the sheath being placed into    |              |
| the right common femoral artery. A vertebral catheter was inserted     |              |
| over the wire and the wire was then removed. A Glidewire was then      |              |
| placed into the vertebral catheter and used to cross through the       |              |
| stenotic right SFA.    Next, a 260 fix core wire was then inserted     |              |
| over the catheter.    Right SFA was stented using 6 x 80 mm            |              |
| self-expanding stent. Drug eluting balloon angioplasty of the right    |              |
| SFA was then performed using 4 x 100 mm Lutonix balloon.     A final   |              |
| arteriogram was then performed through the sheath. The destination     |              |
| sheath was then removed and a Mynx closure device was then used on the |              |
|  left common femoral artery and hemostasis was ensured. The patient    |              |
| was then taken to the observation unit for further monitoring.         |              |
+------------------------------------------------------------------------+--------------+
 
 
 
+-------------------------------------------------------------------------------------------
--------------------------------------------------------------------------------------------
-----------------------------------+
| Procedure Note                                                                            
                                                                                            
                                   |
+-------------------------------------------------------------------------------------------
--------------------------------------------------------------------------------------------
-----------------------------------+
|   Lauro Baldwin Results In - 2019  8:40 AM PST  LOWER EXTREMITY ARTERIOGRAM,             
                                                                                            
                                   |
| UNILATERALPREOPERATIVE DIAGNOSIS:  Critical limb ischemia of right lower extremity with   
                                                                                            
                                   |
| poorly healing wound of right great toePOSTOPERATIVE DIAGNOSIS:  SamePROCEDURE:1.         
                                                                                            
                                   |
| Moderate conscious sedation2. Ultrasound guided access of the left common femoral         
                                                                                            
                                   |
| artery3. Aortogram4. Selective right common femoral artery catheterization with right     
                                                                                            
                                   |
 
| leg runoff5. Right SFA stenting using 6 x 80 mm self-expanding stent6 Drug eluting        
                                                                                            
                                   |
| balloon angioplasty of right SFA using 4 x 100 mm Lutonix balloonSURGEON: Kirt Mclaughlin,        
                                                                                            
                                   |
| MDASSISTANT: NoneANESTHESIA: Moderate sedation and local anesthesiaInformed consent was   
                                                                                            
                                   |
| obtained from the patient. Continuous cardiac monitoring was performed throughout the     
                                                                                            
                                   |
| procedure.Conscious sedation was provided by the nursing staff during the procedure       
                                                                                            
                                   |
| under my supervision.Sedation time: 34 minutesMedications: 2 mg Versed IV, 50 mcg         
                                                                                            
                                   |
| Fentanyl IVESTIMATED BLOOD LOSS: MinimalCONTRAST:  53 mL of Isovue 250INDICATIONS:  See   
                                                                                            
                                   |
| preoperative history and physicalFINDINGS:  Aorta and iliac arteries calcified but        
                                                                                            
                                   |
| without significant stenosis. Right SFA with high-grade stenosis proximally. After        
                                                                                            
                                   |
| angioplasty and stenting, good angiographic results. Good two-vessel runoff to right      
                                                                                            
                                   |
| foot via right anterior tibial artery and peroneal artery. Right posterior tibial artery  
                                                                                            
                                   |
|  was chronically occluded.DESCRIPTION OF PROCEDURE:  The patient was properly identified  
                                                                                            
                                   |
|  and brought to the Cath Lab. The patient was placed supine on the catheter table and     
                                                                                            
                                   |
| patient was given moderate sedation by nursing staff under my supervision. Patient's      
                                                                                            
                                   |
| bilateral groins were then prepped and draped in the usual sterile fashion. Local         
                                                                                            
                                   |
| anesthetic was given to the left groin and the left common femoral artery was accessed    
                                                                                            
                                   |
| under ultrasound guidance using a micropuncture needle. A micropuncture sheath was        
                                                                                            
                                   |
| inserted over a wire and up sized to a 4 French sheath. A Omni flush catheter was then    
                                                                                            
                                   |
| inserted into the distal aorta and aortogram was then performed with the findings as      
                                                                                            
                                   |
| described above. The Omni Flush catheter was then used to guide a Glidewire into the      
                                                                                            
                                   |
 
| right common femoral artery and then the catheter was then advanced over the wire. A      
                                                                                            
                                   |
| right lower extremity runoff was then performed with the findings as described            
                                                                                            
                                   |
| above.Next, an Amplatzer wire was then inserted over the catheter and the 4 French        
                                                                                            
                                   |
| sheath was removed. A 6 French destination sheath was then inserted over the wire with    
                                                                                            
                                   |
| the tip of the sheath being placed into the right common femoral artery. A vertebral      
                                                                                            
                                   |
| catheter was inserted over the wire and the wire was then removed. A Glidewire was then   
                                                                                            
                                   |
| placed into the vertebral catheter and used to cross through the stenotic right SFA.      
                                                                                            
                                   |
| Next, a 260 fix core wire was then inserted over the catheter.  Right SFA was stented     
                                                                                            
                                   |
| using 6 x 80 mm self-expanding stent. Drug eluting balloon angioplasty of the right SFA   
                                                                                            
                                   |
| was then performed using 4 x 100 mm Lutonix balloon.A final arteriogram was then          
                                                                                            
                                   |
| performed through the sheath. The destination sheath was then removed and a Mynx closure  
                                                                                            
                                   |
|  device was then used on the left common femoral artery and hemostasis was ensured. The   
                                                                                            
                                   |
| patient was then taken to the observation unit for further monitoring.IMPRESSION:1.       
                                                                                            
                                   |
| Aorta and iliac arteries were calcified but without significant stenosis.2. Right SFA     
                                                                                            
                                   |
| with high-grade stenosis proximally with good endograft results after angioplasty and     
                                                                                            
                                   |
| stenting.3. Two-vessel runoff via right anterior tibial artery and peroneal artery to     
                                                                                            
                                   |
| right foot.4. 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       
                                                                                            
                                   |
| signed by Kirt Mclaughlin MD on 2019 3:50 PM                                              
                                                                                            
                                   |
|                                                                                           
                                                                                            
                                   |
|A final arteriogram was then performed through the sheath. The destination sheath was then 
removed and a Mynx closure device was then used on the left common femoral artery and hemost
asis was ensured. The patient was  |
 
|then taken to the observation unit for further monitoring.                                 
                                                                                            
                                   |
|                                                                                           
                                                                                            
                                   |
|IMPRESSION:                                                                                
                                                                                            
                                  |
|                                                                                           
                                                                                            
                                   |
|1. Aorta and iliac arteries were calcified but without significant stenosis.               
                                                                                            
                                   |
|2. Right SFA with high-grade stenosis proximally with good endograft results after angiopla
sty and stenting.                                                                           
                                   |
|3. Two-vessel runoff via right anterior tibial artery and peroneal artery to right foot.   
                                                                                            
                                   |
|4. 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                                 
                                                                                            
                                   |
|                                                                                           
                                                                                            
                                   |
|Electronically signed by Kirt Mclaughlin MD on 2019 3:50 PM                                
                                                                                            
                                   |
+-------------------------------------------------------------------------------------------
--------------------------------------------------------------------------------------------
-----------------------------------+
 
 
 
+--------------------+------------------+--------------------+--------------+
| Performing         | Address          | City/State/Zipcode | Phone Number |
| Organization       |                  |                    |              |
+--------------------+------------------+--------------------+--------------+
|   [facility] |   [address] | [place] [address] |              |
+--------------------+------------------+--------------------+--------------+
 IR aortagram abdominal (2019  6:45 PM)
 
+------------------------------------------------------------------------+--------------+
| Impressions                                                            | Performed At |
+------------------------------------------------------------------------+--------------+
|      1. Aorta and iliac arteries were calcified but without            |   [facility]     |
| significant stenosis.  2. Right SFA with high-grade stenosis           | RADIOLOGY    |
| proximally with good endograft results after angioplasty and stenting. |              |
|   3. 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
| artery to right foot.  4. Right posterior tibial artery was            |              |
| chronically occluded.     Electronically signed by Kirt Mclaughlin MD on    |              |
| 2019 3:50 PM                                                      |              |
+------------------------------------------------------------------------+--------------+
 
 
 
+------------------------------------------------------------------------+--------------+
| Narrative                                                              | Performed At |
 
+------------------------------------------------------------------------+--------------+
|   LOWER EXTREMITY ARTERIOGRAM, UNILATERAL     PREOPERATIVE             |   [facility]     |
| DIAGNOSIS:    Critical limb ischemia of right lower extremity with     | RADIOLOGY    |
| poorly healing wound of right great toe     POSTOPERATIVE              |              |
| DIAGNOSIS:    Same     PROCEDURE:  1. Moderate conscious sedation  2.  |              |
| Ultrasound guided access of the left common femoral artery  3.         |              |
| Aortogram  4. Selective right common femoral artery catheterization    |              |
| with right leg runoff  5. Right SFA stenting using 6 x 80 mm           |              |
| self-expanding stent  6 Drug eluting balloon angioplasty of right SFA  |              |
| using 4 x 100 mm Lutonix balloon     SURGEON: Kirt Mclaughlin MD              |              |
| ASSISTANT: None     ANESTHESIA: Moderate sedation and local anesthesia |              |
|      Informed consent was obtained from the patient. Continuous        |              |
| cardiac monitoring was performed throughout the procedure.  Conscious  |              |
| sedation was provided by the nursing staff during the procedure under  |              |
| my supervision.  Sedation time: 34 minutes  Medications: 2 mg Versed   |              |
| IV, 50 mcg Fentanyl IV     ESTIMATED BLOOD LOSS: Minimal               |              |
| CONTRAST:    53 mL of Isovue 250     INDICATIONS:    See preoperative  |              |
| history and physical     FINDINGS:    Aorta and iliac arteries         |              |
| calcified but without significant stenosis. Right SFA with high-grade  |              |
| stenosis proximally. After angioplasty and stenting, good angiographic |              |
|  results. Good two-vessel runoff to right foot via right anterior      |              |
| tibial artery and peroneal artery. Right posterior tibial artery was   |              |
| chronically occluded.     DESCRIPTION OF PROCEDURE:    The patient was |              |
|  properly identified and brought to the Cath Lab. The patient was      |              |
| placed supine on the catheter table and patient was given moderate     |              |
| sedation by nursing staff under my supervision. Patient's bilateral    |              |
| groins were then prepped and draped in the usual sterile fashion.      |              |
| Local anesthetic was given to the left groin and the left common       |              |
| femoral artery was accessed under ultrasound guidance using a          |              |
| micropuncture needle. A micropuncture sheath was inserted over a wire  |              |
| and up sized to a 4 French sheath. A Omni flush catheter was then      |              |
| inserted into the distal aorta and aortogram was then performed with   |              |
| the findings as described above. The Omni Flush catheter was then used |              |
|  to guide a Glidewire into the right common femoral artery and then    |              |
| the catheter was then advanced over the wire. A right lower extremity  |              |
| runoff was then performed with the findings as described above.        |              |
| Next, an Amplatzer wire was then inserted over the catheter and the 4  |              |
| French sheath was removed. A 6 French destination sheath was then      |              |
| inserted over the wire with the tip of the sheath being placed into    |              |
| the right common femoral artery. A vertebral catheter was inserted     |              |
| over the wire and the wire was then removed. A Glidewire was then      |              |
| placed into the vertebral catheter and used to cross through the       |              |
| stenotic right SFA.    Next, a 260 fix core wire was then inserted     |              |
| over the catheter.    Right SFA was stented using 6 x 80 mm            |              |
| self-expanding stent. Drug eluting balloon angioplasty of the right    |              |
| SFA was then performed using 4 x 100 mm Lutonix balloon.     A final   |              |
| arteriogram was then performed through the sheath. The destination     |              |
| sheath was then removed and a Mynx closure device was then used on the |              |
|  left common femoral artery and hemostasis was ensured. The patient    |              |
| was then taken to the observation unit for further monitoring.         |              |
+------------------------------------------------------------------------+--------------+
 
 
 
+-------------------------------------------------------------------------------------------
--------------------------------------------------------------------------------------------
-----------------------------------+
| Procedure Note                                                                            
                                                                                            
                                   |
 
+-------------------------------------------------------------------------------------------
--------------------------------------------------------------------------------------------
-----------------------------------+
|   Denny, Rad Results In - 2019  8:40 AM PST  LOWER EXTREMITY ARTERIOGRAM,             
                                                                                            
                                   |
| UNILATERALPREOPERATIVE DIAGNOSIS:  Critical limb ischemia of right lower extremity with   
                                                                                            
                                   |
| poorly healing wound of right great toePOSTOPERATIVE DIAGNOSIS:  SamePROCEDURE:1.         
                                                                                            
                                   |
| Moderate conscious sedation2. Ultrasound guided access of the left common femoral         
                                                                                            
                                   |
| artery3. Aortogram4. Selective right common femoral artery catheterization with right     
                                                                                            
                                   |
| leg runoff5. Right SFA stenting using 6 x 80 mm self-expanding stent6 Drug eluting        
                                                                                            
                                   |
| balloon angioplasty of right SFA using 4 x 100 mm Lutonix balloonSURGEON: Kirt Mclaughlin,        
                                                                                            
                                   |
| MDASSISTANT: NoneANESTHESIA: Moderate sedation and local anesthesiaInformed consent was   
                                                                                            
                                   |
| obtained from the patient. Continuous cardiac monitoring was performed throughout the     
                                                                                            
                                   |
| procedure.Conscious sedation was provided by the nursing staff during the procedure       
                                                                                            
                                   |
| under my supervision.Sedation time: 34 minutesMedications: 2 mg Versed IV, 50 mcg         
                                                                                            
                                   |
| Fentanyl IVESTIMATED BLOOD LOSS: MinimalCONTRAST:  53 mL of Isovue 250INDICATIONS:  See   
                                                                                            
                                   |
| preoperative history and physicalFINDINGS:  Aorta and iliac arteries calcified but        
                                                                                            
                                   |
| without significant stenosis. Right SFA with high-grade stenosis proximally. After        
                                                                                            
                                   |
| angioplasty and stenting, good angiographic results. Good two-vessel runoff to right      
                                                                                            
                                   |
| foot via right anterior tibial artery and peroneal artery. Right posterior tibial artery  
                                                                                            
                                   |
|  was chronically occluded.DESCRIPTION OF PROCEDURE:  The patient was properly identified  
                                                                                            
                                   |
|  and brought to the Cath Lab. The patient was placed supine on the catheter table and     
                                                                                            
                                   |
| patient was given moderate sedation by nursing staff under my supervision. Patient's      
                                                                                            
                                   |
 
| bilateral groins were then prepped and draped in the usual sterile fashion. Local         
                                                                                            
                                   |
| anesthetic was given to the left groin and the left common femoral artery was accessed    
                                                                                            
                                   |
| under ultrasound guidance using a micropuncture needle. A micropuncture sheath was        
                                                                                            
                                   |
| inserted over a wire and up sized to a 4 French sheath. A Omni flush catheter was then    
                                                                                            
                                   |
| inserted into the distal aorta and aortogram was then performed with the findings as      
                                                                                            
                                   |
| described above. The Omni Flush catheter was then used to guide a Glidewire into the      
                                                                                            
                                   |
| right common femoral artery and then the catheter was then advanced over the wire. A      
                                                                                            
                                   |
| right lower extremity runoff was then performed with the findings as described            
                                                                                            
                                   |
| above.Next, an Amplatzer wire was then inserted over the catheter and the 4 French        
                                                                                            
                                   |
| sheath was removed. A 6 French destination sheath was then inserted over the wire with    
                                                                                            
                                   |
| the tip of the sheath being placed into the right common femoral artery. A vertebral      
                                                                                            
                                   |
| catheter was inserted over the wire and the wire was then removed. A Glidewire was then   
                                                                                            
                                   |
| placed into the vertebral catheter and used to cross through the stenotic right SFA.      
                                                                                            
                                   |
| Next, a 260 fix core wire was then inserted over the catheter.  Right SFA was stented     
                                                                                            
                                   |
| using 6 x 80 mm self-expanding stent. Drug eluting balloon angioplasty of the right SFA   
                                                                                            
                                   |
| was then performed using 4 x 100 mm Lutonix balloon.A final arteriogram was then          
                                                                                            
                                   |
| performed through the sheath. The destination sheath was then removed and a Mynx closure  
                                                                                            
                                   |
|  device was then used on the left common femoral artery and hemostasis was ensured. The   
                                                                                            
                                   |
| patient was then taken to the observation unit for further monitoring.IMPRESSION:1.       
                                                                                            
                                   |
| Aorta and iliac arteries were calcified but without significant stenosis.2. Right SFA     
                                                                                            
                                   |
 
| with high-grade stenosis proximally with good endograft results after angioplasty and     
                                                                                            
                                   |
| stenting.3. Two-vessel runoff via right anterior tibial artery and peroneal artery to     
                                                                                            
                                   |
| right foot.4. 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       
                                                                                            
                                   |
| signed by Kirt Mclaughlin MD on 2019 3:50 PM                                              
                                                                                            
                                   |
|                                                                                           
                                                                                            
                                   |
|A final arteriogram was then performed through the sheath. The destination sheath was then 
removed and a Mynx closure device was then used on the left common femoral artery and hemost
asis was ensured. The patient was  |
|then taken to the observation unit for further monitoring.                                 
                                                                                            
                                   |
|                                                                                           
                                                                                            
                                   |
|IMPRESSION:                                                                                
                                                                                            
                                  |
|                                                                                           
                                                                                            
                                   |
|1. Aorta and iliac arteries were calcified but without significant stenosis.               
                                                                                            
                                   |
|2. Right SFA with high-grade stenosis proximally with good endograft results after angiopla
sty and stenting.                                                                           
                                   |
|3. Two-vessel runoff via right anterior tibial artery and peroneal artery to right foot.   
                                                                                            
                                   |
|4. 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                                 
                                                                                            
                                   |
|                                                                                           
                                                                                            
                                   |
|Electronically signed by Kirt Mclaughlin MD on 2019 3:50 PM                                
                                                                                            
                                   |
+-------------------------------------------------------------------------------------------
--------------------------------------------------------------------------------------------
-----------------------------------+
 
 
 
+--------------------+------------------+--------------------+--------------+
| Performing         | Address          | City[facility]State[facility] | Phone Number |
| Organization       |                  |                    |              |
+--------------------+------------------+--------------------+--------------+
|   [facility] |   [address] | [place] [address] |              |
+--------------------+------------------+--------------------+--------------+
 
 IR guidance vascular access US (2019  5:40 PM)
 
+------------------------------------------------------------------------+--------------+
| Narrative                                                              | Performed At |
+------------------------------------------------------------------------+--------------+
|   This Point of Care (POC) ultrasound image has been reviewed and      |   [facility]     |
| interpreted by the physician identified as the performing physician in | RADIOLOGY    |
|  the   associated interpretation and report.                           |              |
+------------------------------------------------------------------------+--------------+
 
 
 
+--------------------+------------------+--------------------+--------------+
| Performing         | Address          | City/State/Zipcode | Phone Number |
| Organization       |                  |                    |              |
+--------------------+------------------+--------------------+--------------+
|   [facility] |   [address] | [place] [address] |              |
+--------------------+------------------+--------------------+--------------+
 CBC w/manual diff (2019  3:43 PM)
 
+----------------------+-------------------------+-------------------+-----------------+
| Component            | Value                   | Ref Range         | Performed At    |
+----------------------+-------------------------+-------------------+-----------------+
| WBC                  | 5.53Comment:            | 3.80 - 11.00 K/uL | [facility] |
+----------------------+-------------------------+-------------------+-----------------+
| RBC                  | 2.76 (L)                | 3.70 - 5.10 M/uL  | [facility] |
+----------------------+-------------------------+-------------------+-----------------+
| HGB                  | 9.4 (L)                 | 11.3 - 15.5 g/dL  | [facility] |
+----------------------+-------------------------+-------------------+-----------------+
| HCT                  | 28.4 (L)                | 34.0 - 46.0 %     | [facility] |
+----------------------+-------------------------+-------------------+-----------------+
| MCV                  | 102.9 (H)               | 80.0 - 100.0 fl   | [facility] |
+----------------------+-------------------------+-------------------+-----------------+
| MCH                  | 34.1 (H)                | 27.0 - 34.0 pg    | [facility] |
+----------------------+-------------------------+-------------------+-----------------+
| MCHC                 | 33.2                    | 32.0 - 35.5 g/dL  | [facility] |
+----------------------+-------------------------+-------------------+-----------------+
| RDW SD               | 61.3 (H)                | 37 - 53 fl        | [facility] |
+----------------------+-------------------------+-------------------+-----------------+
| PLT                  | 147 (L)Comment:         | 150 - 400 K/uL    | [facility] |
+----------------------+-------------------------+-------------------+-----------------+
| MPV                  | 9.3Comment:             | fl                | [facility] |
+----------------------+-------------------------+-------------------+-----------------+
| DIFF TYPE            | MANUAL                  |                   | [facility] |
+----------------------+-------------------------+-------------------+-----------------+
| Neutrophils Manual   | 64                      | %                 | [facility] |
+----------------------+-------------------------+-------------------+-----------------+
| Lymphocytes Manual   | 28                      | %                 | [facility] |
+----------------------+-------------------------+-------------------+-----------------+
| Monocytes Manual     | 8                       | %                 | [facility] |
+----------------------+-------------------------+-------------------+-----------------+
| Neutrophils Absolute | 3.54                    | 1.90 - 7.40 K/uL  | [facility] |
+----------------------+-------------------------+-------------------+-----------------+
| Lymphocytes Absolute | 1.55                    | 1.00 - 3.90 K/uL  | [facility] |
+----------------------+-------------------------+-------------------+-----------------+
| Monocytes Absolute   | 0.44                    | 0.00 - 0.80 K/uL  | [facility] |
+----------------------+-------------------------+-------------------+-----------------+
| MORPHOLOGY           | 1+Comment: ANISONORMAL  |                   | [facility] |
|                      | PLT MORPHTesting        |                   |                 |
|                      | performed at [facility];[address]    |                   |                 |
 
|                      | [address];[place], [place]  |                   |                 |
|                      | [id number]                   |                   |                 |
|                      |                         |                   |                 |
+----------------------+-------------------------+-------------------+-----------------+
 
 
 
+-------------------+
| Specimen          |
+-------------------+
| Blood - Arm, Left |
+-------------------+
 
 
 
+-------------------+------------------+--------------------+--------------+
| Performing        | Address          | City/State/Zipcode | Phone Number |
| Organization      |                  |                    |              |
+-------------------+------------------+--------------------+--------------+
|   [facility] |   [address] | [facility] [place] [address] |              |
+-------------------+------------------+--------------------+--------------+
 CPK (2019  3:43 PM)
 
+-----------+-------------------------+--------------+-----------------+
| Component | Value                   | Ref Range    | Performed At    |
+-----------+-------------------------+--------------+-----------------+
| CPK       | 28 (L)Comment: Testing  | 30 - 240 U/L | [facility] |
|           | performed at [facility];[address]    |              |                 |
|           | [address];[place]  |              |                 |
|           | [id number]                   |              |                 |
+-----------+-------------------------+--------------+-----------------+
 
 
 
+-------------------+
| Specimen          |
+-------------------+
| Blood - Arm, Left |
+-------------------+
 
 
 
+-------------------+------------------+--------------------+--------------+
| Performing        | Address          | City/State/Zipcode | Phone Number |
| Organization      |                  |                    |              |
+-------------------+------------------+--------------------+--------------+
|   [facility] |   [address] | [place] [address] |              |
+-------------------+------------------+--------------------+--------------+
 US lower extremty  arterial right (2019  2:23 PM)
 
+------------------------------------------------------------------------+--------------+
| Impressions                                                            | Performed At |
+------------------------------------------------------------------------+--------------+
|   1. Right leg shows biphasic inflow with a greater than 50% stenosis  |   [facility]     |
| at  the origin of the superficial femoral artery                       | RADIOLOGY    |
| (137-309).    Biphasic  outflow.  2. Two vessel runoff to the right    |              |
| foot.  Signed by: Eugenio Hoffman Date/Time: 2019 3:11 PM  |              |
+------------------------------------------------------------------------+--------------+
 
 
 
 
+------------------------------------------------------------------------+--------------+
| Narrative                                                              | Performed At |
+------------------------------------------------------------------------+--------------+
|   LOWER EXTREMITY ARTERIAL EXAM WITH IMAGING  CLINICAL INFORMATION:    |   [facility]     |
| The patient is a 68-year-old female presenting to the vascular lab     | RADIOLOGY    |
| with  complaints of right foot nonhealing wound.    We have been asked |              |
|  to  evaluate for peripheral arterial disease.  COMPARISON:  None      |              |
| PROCEDURE:  Imaging of the right lower extremity arteries was          |              |
| performed using both  color Doppler and spectral Doppler techniques    |              |
| with a 9 megahertz linear  array transducer.  FINDINGS:  RIGHT  CFA    |              |
| prox 137 biphasic  DFA prox 71 biphasic  SFA prox 309 biphasic  SFA    |              |
| mid 91 biphasic  SFA distal 127 biphasic  POP mid 96 biphasic  GIL     |              |
| prox 69 biphasic  GIL distal 145 biphasic  PTA prox 50 biphasic  PTA   |              |
| distal 58 biphasic  WINIFRED prox 74 biphasic  WINIFRED distal 0    absent     |              |
+------------------------------------------------------------------------+--------------+
 
 
 
+------------------------------------------------------------------------------------------+
| Procedure Note                                                                           |
+------------------------------------------------------------------------------------------+
|   Lauro Baldwin Results In - 2019  3:14 PM PST  LOWER EXTREMITY ARTERIAL EXAM WITH      |
| IMAGINGCLINICAL INFORMATION:The patient is a 68-year-old female presenting to the        |
| vascular lab withcomplaints of right foot nonhealing wound.  We have been asked          |
| toevaluate for peripheral arterial disease.COMPARISON:NonePROCEDURE:Imaging of the right |
|  lower extremity arteries was performed using bothcolor Doppler and spectral Doppler     |
| techniques with a 9 megahertz lineararray transducer.FINDINGS:RIGHTCFA prox 137          |
| biphasicDFA prox 71 biphasicSFA prox 309 biphasicSFA mid 91 biphasicSFA distal 127       |
| biphasicPOP mid 96 biphasicATA prox 69 biphasicATA distal 145 biphasicPTA prox 50        |
| biphasicPTA distal 58 biphasicPERA prox 74 biphasicPERA distal 0  absentIMPRESSION:1.    |
| Right leg shows biphasic inflow with a greater than 50% stenosis atthe origin of the     |
| superficial femoral artery (137-309).  Biphasicoutflow.2. Two vessel runoff to the right |
|  foot.Signed by: Jamilah Hoffman Date/Time: 2019 3:11 PM                       |
|RIGHT                                                                                    |
|CFA prox 137 biphasic                                                                     |
|DFA prox 71 biphasic                                                                      |
|SFA prox 309 biphasic                                                                     |
|SFA mid 91 biphasic                                                                       |
|SFA distal 127 biphasic                                                                   |
|POP mid 96 biphasic                                                                       |
|GIL prox 69 biphasic                                                                      |
|GIL distal 145 biphasic                                                                   |
|PTA prox 50 biphasic                                                                      |
|PTA distal 58 biphasic                                                                    |
|WINIFRED prox 74 biphasic                                                                     |
|WINIFRED distal 0  absent                                                                     |
|IMPRESSION:                                                                              |
|1. Right leg shows biphasic inflow with a greater than 50% stenosis at                    |
|the origin of the superficial femoral artery (137-309).  Biphasic                         |
|outflow.                                                                                 |
|2. Two vessel runoff to the right foot.                                                   |
|Signed by: Eugenio Hoffman                                                                |
|Sign Date/Time: 2019 3:11 PM                                                        |
+------------------------------------------------------------------------------------------+
 
 
 
+--------------------+------------------+--------------------+--------------+
| Performing         | Address          | City/State/Zipcode | Phone Number |
 
| Organization       |                  |                    |              |
+--------------------+------------------+--------------------+--------------+
|   [facility] |   [address] | [place] [address] |              |
+--------------------+------------------+--------------------+--------------+
 from Last 3 Months
 
 Insurance
 
 
+----------+--------+-------------+------+-------+----------------------+
| Payer    | Benefi | Subscriber  | Type | Phone | Address              |
|          | t Plan | ID          |      |       |                      |
|          |  /     |             |      |       |                      |
|          | Group  |             |      |       |                      |
+----------+--------+-------------+------+-------+----------------------+
| MEDICARE | MEDICA | [id number] |      |       |   [address]        |
|          | [place]     |             |      |       | [place] [id number] |
|          | IP-OP  |             |      |       |                      |
+----------+--------+-------------+------+-------+----------------------+
| MEDICAID | MEDICA | [id number]    |      |       |   [address]        |
|          | ID     |             |      |       | [place]          |
|          | [place] |             |      |       | [id number]           |
+----------+--------+-------------+------+-------+----------------------+
 
 
 
+----------------------+--------+-------------+--------+-------------+-----------------+
| Guarantor Name       | Accoun | Relation to | Date   | Phone       | Billing Address |
|                      | t Type |  Patient    | of     |             |                 |
|                      |        |             | Birth  |             |                 |
+----------------------+--------+-------------+--------+-------------+-----------------+
| CHERIE JO | Person | Self        | / |   Home:     |   [address]    |
|                      | al/Fam |             | 1950   | [phone] | [place]    |
|                      | [place]    |             |        | [address]        | [address]      |
+----------------------+--------+-------------+--------+-------------+-----------------+

## 2019-04-19 NOTE — XMS
Encounter Summary
  Created on: 2019
 
 Cherie Villalobos
 External Reference #: [id number]
 : 48
 Sex: Female
 
 Demographics
 
 
+-----------------------+--------------------------+
| Address               | [address]               |
|                       | [place]  [address] |
+-----------------------+--------------------------+
| Home Phone            | [phone]          |
+-----------------------+--------------------------+
| Preferred Language    | Unknown                  |
+-----------------------+--------------------------+
| Marital Status        |                   |
+-----------------------+--------------------------+
| Jewish Affiliation | 1013                     |
+-----------------------+--------------------------+
| Race                  | Unknown                  |
+-----------------------+--------------------------+
| Ethnic Group          | Unknown                  |
+-----------------------+--------------------------+
 
 
 Author
 
 
+--------------+-----------------------+
| Author       | Sherry SanT[facility]sti |
+--------------+-----------------------+
| Organization | [facility] |
+--------------+-----------------------+
| Address      | Unknown               |
+--------------+-----------------------+
| Phone        | Unavailable           |
+--------------+-----------------------+
 
 
 
 Support
 
 
+---------------+--------------+---------------------+-----------------+
| Name          | Relationship | Address             | Phone           |
+---------------+--------------+---------------------+-----------------+
| Anoop Villalobos | ECON         | [place], | [phone] |
|               |              |  [place]  [address]     |                 |
+---------------+--------------+---------------------+-----------------+
| Jennifer Dickson | ECON         | [place]       | [phone] |
|               |              | [phone]               |                 |
+---------------+--------------+---------------------+-----------------+
 
 
 
 
 Care Team Providers
 
 
+-----------------------+------+-----------------+
| Care Team Member Name | Role | Phone           |
+-----------------------+------+-----------------+
| Ivy Couch DO      | PCP  | [phone] |
+-----------------------+------+-----------------+
 
 
 
 Encounter Details
 
 
+--------+------------+----------------------+-----------+-------------+
| Date   | Type       | Department           | Care Team | Description |
+--------+------------+----------------------+-----------+-------------+
| / | Procedure  |   [facility]    |           |             |
| 2019   | Pass       | [facility] [address]   |           |             |
|        |            | Floor [address] |           |             |
|        |            |   [address] [facility]     |           |             |
|        |            | [place] [address]   |           |             |
|        |            | [phone]         |           |             |
+--------+------------+----------------------+-----------+-------------+
 
 
 
 Social History
 
 
+---------------+------------+-----------+--------+------------------+
| Tobacco Use   | Types      | Packs/Day | Years  | Date             |
|               |            |           | Used   |                  |
+---------------+------------+-----------+--------+------------------+
| Former Smoker | Cigarettes | 1         | 30     | Quit: 2004 |
+---------------+------------+-----------+--------+------------------+
 
 
 
+---------------------+---+---+---+
| Smokeless Tobacco:  |   |   |   |
| Never Used          |   |   |   |
+---------------------+---+---+---+
 
 
 
+------------------------------+
| Comments: quite smoking  |
+------------------------------+
 
 
 
+-------------+-----------+---------+----------+
| Alcohol Use | Drinks/We | oz/Week | Comments |
|             | ek        |         |          |
+-------------+-----------+---------+----------+
| No          |           |         |          |
+-------------+-----------+---------+----------+
 
 
 
 
+------------------+---------------+
| Sex Assigned at  | Date Recorded |
| Birth            |               |
+------------------+---------------+
| Not on file      |               |
+------------------+---------------+
 as of this encounter
 
 Plan of Treatment
 
 
+--------+----------+-----------------+----------------------+-------------+
| Date   | Type     | Specialty       | Care Team            | Description |
+--------+----------+-----------------+----------------------+-------------+
| / | Initial  | General Surgery |   Сергей Medeiros, |             |
| 2019   | consult  |                 |  [address]  |             |
|        |          |                 |  [place] [address]  |             |
|        |          |                 |  [phone]        |             |
|        |          |                 | [phone] (Fax)   |             |
+--------+----------+-----------------+----------------------+-------------+
 as of this encounter
 
 Visit Diagnoses
 Not on filein this encounter

## 2019-04-19 NOTE — XMS
Encounter Summary
  Created on: 2019
 
 Cherie Villalobos
 External Reference #: [id number]
 : 48
 Sex: Female
 
 Demographics
 
 
+-----------------------+--------------------------+
| Address               | [address]               |
|                       | [place]  [address] |
+-----------------------+--------------------------+
| Home Phone            | [phone]          |
+-----------------------+--------------------------+
| Preferred Language    | Unknown                  |
+-----------------------+--------------------------+
| Marital Status        |                   |
+-----------------------+--------------------------+
| Church Affiliation | 1013                     |
+-----------------------+--------------------------+
| Race                  | Unknown                  |
+-----------------------+--------------------------+
| Ethnic Group          | Unknown                  |
+-----------------------+--------------------------+
 
 
 Author
 
 
+--------------+-----------------------+
| Author       | Sherry [employer] |
+--------------+-----------------------+
| Organization | [facility] |
+--------------+-----------------------+
| Address      | Unknown               |
+--------------+-----------------------+
| Phone        | Unavailable           |
+--------------+-----------------------+
 
 
 
 Support
 
 
+---------------+--------------+---------------------+-----------------+
| Name          | Relationship | Address             | Phone           |
+---------------+--------------+---------------------+-----------------+
| Anoop Villalobos | ECON         | [place], | [phone] |
|               |              |  [place]  [address]     |                 |
+---------------+--------------+---------------------+-----------------+
| Jennifer Dickson | ECON         | [place]       | [phone] |
|               |              | [address]               |                 |
+---------------+--------------+---------------------+-----------------+
 
 
 
 
 Care Team Providers
 
 
+-----------------------+------+-----------------+
| Care Team Member Name | Role | Phone           |
+-----------------------+------+-----------------+
| Ivy Couch DO      | PCP  | [phone] |
+-----------------------+------+-----------------+
 
 
 
 Encounter Details
 
 
+--------+-------------+----------------------+---------------------+-------------+
| Date   | Type        | Department           | Care Team           | Description |
+--------+-------------+----------------------+---------------------+-------------+
| 04/10/ | Orders Only |   [facility] |   Luisa Segovia  |             |
| 2019   |             |  [address]  [address]      | [address]               |             |
|        |             | [address]   |                     |             |
|        |             | [place]         |                     |             |
|        |             | [address]           |                     |             |
|        |             | [phone]         |                     |             |
+--------+-------------+----------------------+---------------------+-------------+
 
 
 
 Social History
 
 
+---------------+------------+-----------+--------+------------------+
| Tobacco Use   | Types      | Packs/Day | Years  | Date             |
|               |            |           | Used   |                  |
+---------------+------------+-----------+--------+------------------+
| Former Smoker | Cigarettes | 1         | 30     | Quit: 2004 |
+---------------+------------+-----------+--------+------------------+
 
 
 
+---------------------+---+---+---+
| Smokeless Tobacco:  |   |   |   |
| Never Used          |   |   |   |
+---------------------+---+---+---+
 
 
 
+------------------------------+
| Comments: quite smoking 2004 |
+------------------------------+
 
 
 
+-------------+-----------+---------+----------+
| Alcohol Use | Drinks/We | oz/Week | Comments |
|             | ek        |         |          |
+-------------+-----------+---------+----------+
| No          |           |         |          |
+-------------+-----------+---------+----------+
 
 
 
 
+------------------+---------------+
| Sex Assigned at  | Date Recorded |
| Birth            |               |
+------------------+---------------+
| Not on file      |               |
+------------------+---------------+
 as of this encounter
 
 Plan of Treatment
 
 
+--------+----------+-----------------+----------------------+-------------+
| Date   | Type     | Specialty       | Care Team            | Description |
+--------+----------+-----------------+----------------------+-------------+
| / | Initial  | General Surgery |   Сергей Medeiros, |             |
| 2019   | consult  |                 |  [address]  |             |
|        |          |                 |  [place] [address]  |             |
|        |          |                 |  [phone]        |             |
|        |          |                 | [phone] (Fax)   |             |
+--------+----------+-----------------+----------------------+-------------+
 as of this encounter
 
 Visit Diagnoses
 Not on filein this encounter

## 2019-04-19 NOTE — XMS
Encounter Summary
  Created on: 2019
 
 Cherie Villalobos
 External Reference #: [id number]
 : 48
 Sex: Female
 
 Demographics
 
 
+-----------------------+--------------------------+
| Address               | [address]               |
|                       | [place]  [address] |
+-----------------------+--------------------------+
| Home Phone            | [phone]          |
+-----------------------+--------------------------+
| Preferred Language    | Unknown                  |
+-----------------------+--------------------------+
| Marital Status        |                   |
+-----------------------+--------------------------+
| Yazidi Affiliation | 1013                     |
+-----------------------+--------------------------+
| Race                  | Unknown                  |
+-----------------------+--------------------------+
| Ethnic Group          | Unknown                  |
+-----------------------+--------------------------+
 
 
 Author
 
 
+--------------+-----------------------+
| Author       | Sherry [employer] |
+--------------+-----------------------+
| Organization | [facility] |
+--------------+-----------------------+
| Address      | Unknown               |
+--------------+-----------------------+
| Phone        | Unavailable           |
+--------------+-----------------------+
 
 
 
 Support
 
 
+---------------+--------------+---------------------+-----------------+
| Name          | Relationship | Address             | Phone           |
+---------------+--------------+---------------------+-----------------+
| Anoop Villalobos | ECON         | [place], | [phone] |
|               |              |  [place]  [address]     |                 |
+---------------+--------------+---------------------+-----------------+
| Jennifer Dickson | ECON         | [place]       | [phone] |
|               |              | [address]               |                 |
+---------------+--------------+---------------------+-----------------+
 
 
 
 
 Care Team Providers
 
 
+-----------------------+------+-----------------+
| Care Team Member Name | Role | Phone           |
+-----------------------+------+-----------------+
| Ivy Couch DO      | PCP  | [phone] |
+-----------------------+------+-----------------+
 
 
 
 Encounter Details
 
 
+--------+-------------+----------------------+---------------------+-------------+
| Date   | Type        | Department           | Care Team           | Description |
+--------+-------------+----------------------+---------------------+-------------+
| / | Orders Only |   [facility] |   Luisa Segovia  |             |
| 2019   |             |  [address]  [address]      | [address]               |             |
|        |             | [address]   |                     |             |
|        |             | [place]         |                     |             |
|        |             | [address]           |                     |             |
|        |             | [phone]         |                     |             |
+--------+-------------+----------------------+---------------------+-------------+
 
 
 
 Social History
 
 
+---------------+------------+-----------+--------+------------------+
| Tobacco Use   | Types      | Packs/Day | Years  | Date             |
|               |            |           | Used   |                  |
+---------------+------------+-----------+--------+------------------+
| Former Smoker | Cigarettes | 1         | 30     | Quit: 2004 |
+---------------+------------+-----------+--------+------------------+
 
 
 
+---------------------+---+---+---+
| Smokeless Tobacco:  |   |   |   |
| Never Used          |   |   |   |
+---------------------+---+---+---+
 
 
 
+------------------------------+
| Comments: quite smoking 2004 |
+------------------------------+
 
 
 
+-------------+-----------+---------+----------+
| Alcohol Use | Drinks/We | oz/Week | Comments |
|             | ek        |         |          |
+-------------+-----------+---------+----------+
| No          |           |         |          |
+-------------+-----------+---------+----------+
 
 
 
 
+------------------+---------------+
| Sex Assigned at  | Date Recorded |
| Birth            |               |
+------------------+---------------+
| Not on file      |               |
+------------------+---------------+
 as of this encounter
 
 Plan of Treatment
 
 
+--------+----------+-----------------+----------------------+-------------+
| Date   | Type     | Specialty       | Care Team            | Description |
+--------+----------+-----------------+----------------------+-------------+
| / | Initial  | General Surgery |   Сергей Medeiros, |             |
| 2019   | consult  |                 |  [address]  |             |
|        |          |                 |  [place] [address]  |             |
|        |          |                 |  [phone]        |             |
|        |          |                 | [phone] (Fax)   |             |
+--------+----------+-----------------+----------------------+-------------+
 as of this encounter
 
 Visit Diagnoses
 Not on filein this encounter

## 2019-04-19 NOTE — XMS
Encounter Summary
  Created on: 2019
 
 Cherie Villalobos
 External Reference #: [id number]
 : 48
 Sex: Female
 
 Demographics
 
 
+-----------------------+--------------------------+
| Address               | [address]               |
|                       | [place]  [address] |
+-----------------------+--------------------------+
| Home Phone            | [phone]          |
+-----------------------+--------------------------+
| Preferred Language    | Unknown                  |
+-----------------------+--------------------------+
| Marital Status        |                   |
+-----------------------+--------------------------+
| Moravian Affiliation | 1013                     |
+-----------------------+--------------------------+
| Race                  | Unknown                  |
+-----------------------+--------------------------+
| Ethnic Group          | Unknown                  |
+-----------------------+--------------------------+
 
 
 Author
 
 
+--------------+-----------------------+
| Author       | Sherry Ringpay |
+--------------+-----------------------+
| Organization | [facility] |
+--------------+-----------------------+
| Address      | Unknown               |
+--------------+-----------------------+
| Phone        | Unavailable           |
+--------------+-----------------------+
 
 
 
 Support
 
 
+---------------+--------------+---------------------+-----------------+
| Name          | Relationship | Address             | Phone           |
+---------------+--------------+---------------------+-----------------+
| Anoop Villalobos | ECON         | [place], | [phone] |
|               |              |  [place]  [address]     |                 |
+---------------+--------------+---------------------+-----------------+
| Jennifer Dickson | ECON         | [place]       | [phone] |
|               |              | [address]               |                 |
+---------------+--------------+---------------------+-----------------+
 
 
 
 
 Care Team Providers
 
 
+-----------------------+------+-----------------+
| Care Team Member Name | Role | Phone           |
+-----------------------+------+-----------------+
| Ivy Couch DO      | PCP  | [phone] |
+-----------------------+------+-----------------+
 
 
 
 Encounter Details
 
 
+--------+-------------+----------------------+----------------------+-------------+
| Date   | Type        | Department           | Care Team            | Description |
+--------+-------------+----------------------+----------------------+-------------+
| / | Documentati |   [facility] RELEASE OF  |   See, Medical       |             |
| 2019   | on Only     | INFORMATION  [address]     | Record  [address]   |             |
|        |             | [address]           | [address], |             |
|        |             | [place] [address]   |  [place] [address]            |             |
|        |             | [phone]         | [phone]         |             |
|        |             |                      | [phone] (Fax)   |             |
+--------+-------------+----------------------+----------------------+-------------+
 
 
 
 Social History
 
 
+---------------+------------+-----------+--------+------------------+
| Tobacco Use   | Types      | Packs/Day | Years  | Date             |
|               |            |           | Used   |                  |
+---------------+------------+-----------+--------+------------------+
| Former Smoker | Cigarettes | 1         | 30     | Quit: 2004 |
+---------------+------------+-----------+--------+------------------+
 
 
 
+---------------------+---+---+---+
| Smokeless Tobacco:  |   |   |   |
| Never Used          |   |   |   |
+---------------------+---+---+---+
 
 
 
+------------------------------+
| Comments: quite smoking  |
+------------------------------+
 
 
 
+-------------+-----------+---------+----------+
| Alcohol Use | Drinks/We | oz/Week | Comments |
|             | ek        |         |          |
+-------------+-----------+---------+----------+
| No          |           |         |          |
+-------------+-----------+---------+----------+
 
 
 
 
+------------------+---------------+
| Sex Assigned at  | Date Recorded |
| Birth            |               |
+------------------+---------------+
| Not on file      |               |
+------------------+---------------+
 as of this encounter
 
 Plan of Treatment
 
 
+--------+----------+-----------------+----------------------+-------------+
| Date   | Type     | Specialty       | Care Team            | Description |
+--------+----------+-----------------+----------------------+-------------+
| / | Initial  | General Surgery |   Сергей Medeiros, |             |
|    | consult  |                 |  [address]  |             |
|        |          |                 |  [place] [address]  |             |
|        |          |                 |  [phone]        |             |
|        |          |                 | [phone] (Fax)   |             |
+--------+----------+-----------------+----------------------+-------------+
 as of this encounter
 
 Visit Diagnoses
 Not on filein this encounter

## 2019-04-19 NOTE — XMS
Encounter Summary
  Created on: 2019
 
 Cherie Villalobos
 External Reference #: [id number]
 : 48
 Sex: Female
 
 Demographics
 
 
+-----------------------+--------------------------+
| Address               | [address]               |
|                       | [place]  [address] |
+-----------------------+--------------------------+
| Home Phone            | [phone]          |
+-----------------------+--------------------------+
| Preferred Language    | Unknown                  |
+-----------------------+--------------------------+
| Marital Status        |                   |
+-----------------------+--------------------------+
| Sikhism Affiliation | 1013                     |
+-----------------------+--------------------------+
| Race                  | Unknown                  |
+-----------------------+--------------------------+
| Ethnic Group          | Unknown                  |
+-----------------------+--------------------------+
 
 
 Author
 
 
+--------------+-----------------------+
| Author       | Sherry [employer] |
+--------------+-----------------------+
| Organization | [facility] |
+--------------+-----------------------+
| Address      | Unknown               |
+--------------+-----------------------+
| Phone        | Unavailable           |
+--------------+-----------------------+
 
 
 
 Support
 
 
+---------------+--------------+---------------------+-----------------+
| Name          | Relationship | Address             | Phone           |
+---------------+--------------+---------------------+-----------------+
| Anoop Villalobos | ECON         | [place], | [phone] |
|               |              |  [place]  [address]     |                 |
+---------------+--------------+---------------------+-----------------+
| Jennifer Dickson | ECON         | [place]       | [phone] |
|               |              | [address]               |                 |
+---------------+--------------+---------------------+-----------------+
 
 
 
 
 Care Team Providers
 
 
+-----------------------+------+-----------------+
| Care Team Member Name | Role | Phone           |
+-----------------------+------+-----------------+
| Ivy Couch DO      | PCP  | [phone] |
+-----------------------+------+-----------------+
 
 
 
 Reason for Visit
 
 
+--------+--------------------------------+
| Reason | Comments                       |
+--------+--------------------------------+
| Other  | [facility] medical records request |
+--------+--------------------------------+
 
 
 
 Encounter Details
 
 
+--------+-------------+----------------------+---------------------+------------------+
| Date   | Type        | Department           | Care Team           | Description      |
+--------+-------------+----------------------+---------------------+------------------+
| / | Documentati |   [facility]      |   Peabody, Jessica  | Other [facility]    |
| 2019   | on Only     | [address]        | [facility] [address]               | medical records  |
|        |             | [address]   |                     | request)         |
|        |             | [place] [address]   |                     |                  |
|        |             | [phone]         |                     |                  |
+--------+-------------+----------------------+---------------------+------------------+
 
 
 
 Social History
 
 
+---------------+------------+-----------+--------+------------------+
| Tobacco Use   | Types      | Packs/Day | Years  | Date             |
|               |            |           | Used   |                  |
+---------------+------------+-----------+--------+------------------+
| Former Smoker | Cigarettes | 1         | 30     | Quit: 2004 |
+---------------+------------+-----------+--------+------------------+
 
 
 
+---------------------+---+---+---+
| Smokeless Tobacco:  |   |   |   |
| Never Used          |   |   |   |
+---------------------+---+---+---+
 
 
 
+------------------------------+
| Comments: quite smoking 2004 |
+------------------------------+
 
 
 
 
+-------------+-----------+---------+----------+
| Alcohol Use | Drinks/We | oz/Week | Comments |
|             | ek        |         |          |
+-------------+-----------+---------+----------+
| No          |           |         |          |
+-------------+-----------+---------+----------+
 
 
 
+------------------+---------------+
| Sex Assigned at  | Date Recorded |
| Birth            |               |
+------------------+---------------+
| Not on file      |               |
+------------------+---------------+
 as of this encounter
 
 Progress Notes
 Peabody, Jessica M, RN - 2019  6:22 PM [facility] medical records request was denied. 
Fax sent forward to Fidel Catia (F#[phone]). Fax confirmation received. in this en
counter
 
 Plan of Treatment
 
 
+--------+----------+-----------------+----------------------+-------------+
| Date   | Type     | Specialty       | Care Team            | Description |
+--------+----------+-----------------+----------------------+-------------+
| / | Initial  | General Surgery |   Сергей Medeiros, |             |
|    | consult  |                 |  MD [address]  |             |
|        |          |                 |  [place] [address]  |             |
|        |          |                 |  [phone]        |             |
|        |          |                 | [phone] (Fax)   |             |
+--------+----------+-----------------+----------------------+-------------+
 as of this encounter
 
 Visit Diagnoses
 Not on filein this encounter

## 2019-04-19 NOTE — XMS
Encounter Summary
  Created on: 2019
 
 Cherie Villalobos
 External Reference #: [id number]
 : 48
 Sex: Female
 
 Demographics
 
 
+-----------------------+--------------------------+
| Address               | [address]               |
|                       | [place]  [address] |
+-----------------------+--------------------------+
| Home Phone            | [phone]          |
+-----------------------+--------------------------+
| Preferred Language    | Unknown                  |
+-----------------------+--------------------------+
| Marital Status        |                   |
+-----------------------+--------------------------+
| Latter-day Affiliation | 1013                     |
+-----------------------+--------------------------+
| Race                  | Unknown                  |
+-----------------------+--------------------------+
| Ethnic Group          | Unknown                  |
+-----------------------+--------------------------+
 
 
 Author
 
 
+--------------+-----------------------+
| Author       | Sherry [employer] |
+--------------+-----------------------+
| Organization | [facility] |
+--------------+-----------------------+
| Address      | Unknown               |
+--------------+-----------------------+
| Phone        | Unavailable           |
+--------------+-----------------------+
 
 
 
 Support
 
 
+---------------+--------------+---------------------+-----------------+
| Name          | Relationship | Address             | Phone           |
+---------------+--------------+---------------------+-----------------+
| Anoop Villalobos | ECON         | [place], | [phone] |
|               |              |  [place]  [address]     |                 |
+---------------+--------------+---------------------+-----------------+
| Jennifer Dickson | ECON         | [place]       | [phone] |
|               |              | [address]               |                 |
+---------------+--------------+---------------------+-----------------+
 
 
 
 
 Care Team Providers
 
 
+-----------------------+------+-----------------+
| Care Team Member Name | Role | Phone           |
+-----------------------+------+-----------------+
| Ivy Couch DO      | PCP  | [phone] |
+-----------------------+------+-----------------+
 
 
 
 Encounter Details
 
 
+--------+-------------+----------------------+---------------------+-------------+
| Date   | Type        | Department           | Care Team           | Description |
+--------+-------------+----------------------+---------------------+-------------+
| 04/10/ | Orders Only |   [facility] |   Luisa Segovia  |             |
| 2019   |             |  [address]      | [address] [address]               |             |
|        |             | [address]   |                     |             |
|        |             | [place]         |                     |             |
|        |             | [address]           |                     |             |
|        |             | [phone]         |                     |             |
+--------+-------------+----------------------+---------------------+-------------+
 
 
 
 Social History
 
 
+---------------+------------+-----------+--------+------------------+
| Tobacco Use   | Types      | Packs/Day | Years  | Date             |
|               |            |           | Used   |                  |
+---------------+------------+-----------+--------+------------------+
| Former Smoker | Cigarettes | 1         | 30     | Quit: 2004 |
+---------------+------------+-----------+--------+------------------+
 
 
 
+---------------------+---+---+---+
| Smokeless Tobacco:  |   |   |   |
| Never Used          |   |   |   |
+---------------------+---+---+---+
 
 
 
+------------------------------+
| Comments: quite smoking 2004 |
+------------------------------+
 
 
 
+-------------+-----------+---------+----------+
| Alcohol Use | Drinks/We | oz/Week | Comments |
|             | ek        |         |          |
+-------------+-----------+---------+----------+
| No          |           |         |          |
+-------------+-----------+---------+----------+
 
 
 
 
+------------------+---------------+
| Sex Assigned at  | Date Recorded |
| Birth            |               |
+------------------+---------------+
| Not on file      |               |
+------------------+---------------+
 as of this encounter
 
 Plan of Treatment
 
 
+--------+----------+-----------------+----------------------+-------------+
| Date   | Type     | Specialty       | Care Team            | Description |
+--------+----------+-----------------+----------------------+-------------+
| / | Initial  | General Surgery |   Сергей Medeiros, |             |
| 2019   | consult  |                 |  MD [address]  |             |
|        |          |                 |  [place] [address]  |             |
|        |          |                 |  [phone]        |             |
|        |          |                 | [phone] (Fax)   |             |
+--------+----------+-----------------+----------------------+-------------+
 as of this encounter
 
 Visit Diagnoses
 Not on filein this encounter

## 2019-04-19 NOTE — XMS
Encounter Summary
  Created on: 2019
 
 Cherie Villalobos
 External Reference #: [id number]
 : 48
 Sex: Female
 
 Demographics
 
 
+-----------------------+--------------------------+
| Address               | [address]               |
|                       | [place]  [address] |
+-----------------------+--------------------------+
| Home Phone            | [phone]          |
+-----------------------+--------------------------+
| Preferred Language    | Unknown                  |
+-----------------------+--------------------------+
| Marital Status        |                   |
+-----------------------+--------------------------+
| Protestant Affiliation | 1013                     |
+-----------------------+--------------------------+
| Race                  | Unknown                  |
+-----------------------+--------------------------+
| Ethnic Group          | Unknown                  |
+-----------------------+--------------------------+
 
 
 Author
 
 
+--------------+--------------------------------------------+
| Author       | [facility]  |
|              | [facility] [place]                                |
+--------------+--------------------------------------------+
| Organization | [facility]  |
|              | [facility] [place]                                |
+--------------+--------------------------------------------+
| Address      | Unknown                                    |
+--------------+--------------------------------------------+
| Phone        | Unavailable                                |
+--------------+--------------------------------------------+
 
 
 
 Support
 
 
+---------------+--------------+---------------------+-----------------+
| Name          | Relationship | Address             | Phone           |
+---------------+--------------+---------------------+-----------------+
| Anoop Villalobos | ECON         | [address], | [phone] |
|               |              |  [place]  [address]     |                 |
+---------------+--------------+---------------------+-----------------+
| Jennifer Dickson | ECON         | [place], [place]       | [phone] |
|               |              | [address]               |                 |
+---------------+--------------+---------------------+-----------------+
 
 
 
 
 Care Team Providers
 
 
+--------------------------+------+-----------------+
| Care Team Member Name    | Role | Phone           |
+--------------------------+------+-----------------+
| Araseli Montelongo Activity  | PCP  | [phone] |
| Professional             |      |                 |
+--------------------------+------+-----------------+
 
 
 
 Reason for Visit
 
 
+-----------+---------------------------------+
| Reason    | Comments                        |
+-----------+---------------------------------+
| Lab Order | Pt due for labs prior to appt.  |
+-----------+---------------------------------+
 
 
 
 Encounter Details
 
 
+--------+-----------+----------------------+----------------------+--------------------+
| Date   | Type      | Department           | Care Team            | Description        |
+--------+-----------+----------------------+----------------------+--------------------+
| / | Telephone |   [facility]          |   Johnie John, | Lab Order (Pt due  |
|    |           | CARDIOLOGY  [address]    |  MD  [address] | for labs prior to  |
|        |           | [address]  [place], |  St.  [place],   | appt. )            |
|        |           |  [place] [address]       | [place] [address]             |                    |
|        |           | [phone]         | [phone]         |                    |
|        |           |                      | [phone] (Fax)   |                    |
+--------+-----------+----------------------+----------------------+--------------------+
 
 
 
 Social History
 
 
+---------------+------------+-----------+--------+------------------+
| Tobacco Use   | Types      | Packs/Day | Years  | Date             |
|               |            |           | Used   |                  |
+---------------+------------+-----------+--------+------------------+
| Former Smoker | Cigarettes | 1         | 30     | Quit: 2004 |
+---------------+------------+-----------+--------+------------------+
 
 
 
+---------------------+---+---+---+
| Smokeless Tobacco:  |   |   |   |
| Never Used          |   |   |   |
+---------------------+---+---+---+
 
 
 
 
+-------------+-----------+---------+----------+
| Alcohol Use | Drinks/We | oz/Week | Comments |
|             | ek        |         |          |
+-------------+-----------+---------+----------+
| No          |           |         |          |
+-------------+-----------+---------+----------+
 
 
 
+------------------+---------------+
| Sex Assigned at  | Date Recorded |
| Birth            |               |
+------------------+---------------+
| Not on file      |               |
+------------------+---------------+
 
 
 
+----------------+-------------+-------------+
| Job Start Date | Occupation  | Industry    |
+----------------+-------------+-------------+
| Not on file    | Not on file | Not on file |
+----------------+-------------+-------------+
 
 
 
+----------------+--------------+------------+
| Travel History | Travel Start | Travel End |
+----------------+--------------+------------+
 
 
 
+-------------------------------------+
| No recent travel history available. |
+-------------------------------------+
 documented as of this encounter
 
 Functional Status
 
 
+---------------------------------------------+----------+--------------------+
| Functional Status                           | Response | Date of Assessment |
+---------------------------------------------+----------+--------------------+
| Are you deaf or do you have serious         | No       | 2018         |
| difficulty hearing?                         |          |                    |
+---------------------------------------------+----------+--------------------+
| Are you blind or do you have serious        | No       | 2018         |
| difficulty seeing, even when wearing        |          |                    |
| glasses?                                    |          |                    |
+---------------------------------------------+----------+--------------------+
| Do you have serious difficulty walking or   | No       | 2018         |
| climbing stairs? (5 years old or older)     |          |                    |
+---------------------------------------------+----------+--------------------+
| Do you have difficulty dressing or bathing? | No       | 2018         |
|  (5 years old or older)                     |          |                    |
+---------------------------------------------+----------+--------------------+
| Because of a physical, mental, or emotional | No       | 2018         |
|  condition, do you have difficulty doing    |          |                    |
| errands alone such as visiting a doctor's   |          |                    |
 
| office or shopping? [15 years old or        |          |                    |
| older)]                                     |          |                    |
+---------------------------------------------+----------+--------------------+
 
 
 
+---------------------------------------------+----------+--------------------+
| Cognitive Status                            | Response | Date of Assessment |
+---------------------------------------------+----------+--------------------+
| Because of a physical, mental, or emotional | No       | 2018         |
|  condition, do you have serious difficulty  |          |                    |
| concentrating, remembering, or making       |          |                    |
| decisions? (5 years old or older)           |          |                    |
+---------------------------------------------+----------+--------------------+
 documented as of this encounter
 
 Plan of Treatment
 
 
+--------+---------+------------+----------------------+-------------+
| Date   | Type    | Specialty  | Care Team            | Description |
+--------+---------+------------+----------------------+-------------+
| / | Office  | Cardiology |   Johnie John, |             |
| 2019   | Visit   |            |  MD  [address] |             |
|        |         |            |  [place],   |             |
|        |         |            | [place] [address]             |             |
|        |         |            | [phone]         |             |
|        |         |            | [phone] (Fax)   |             |
+--------+---------+------------+----------------------+-------------+
 
 
 
+-------------+--------+----------------------+----------------------+
| Name        | Priori | Associated Diagnoses | Order Schedule       |
|             | ty     |                      |                      |
+-------------+--------+----------------------+----------------------+
| Lipid Panel | Routin |   Mixed              | 1 Occurrences        |
|             | e      | hyperlipidemia       | starting 2019  |
|             |        |                      | until 2020     |
+-------------+--------+----------------------+----------------------+
 documented as of this encounter
 
 Visit Diagnoses
 
 
+----------------------------------+
| Diagnosis                        |
+----------------------------------+
|   Mixed hyperlipidemia - Primary |
+----------------------------------+
 documented in this encounter

## 2019-04-19 NOTE — XMS
Encounter Summary
  Created on: 2019
 
 Cherie Villalobos
 External Reference #: [id number]
 : 48
 Sex: Female
 
 Demographics
 
 
+-----------------------+--------------------------+
| Address               | [address]               |
|                       | [place]  [address] |
+-----------------------+--------------------------+
| Home Phone            | [phone]          |
+-----------------------+--------------------------+
| Preferred Language    | Unknown                  |
+-----------------------+--------------------------+
| Marital Status        |                   |
+-----------------------+--------------------------+
| Mormon Affiliation | 1013                     |
+-----------------------+--------------------------+
| Race                  | Unknown                  |
+-----------------------+--------------------------+
| Ethnic Group          | Unknown                  |
+-----------------------+--------------------------+
 
 
 Author
 
 
+--------------+-----------------------+
| Author       | Sherry [employer] |
+--------------+-----------------------+
| Organization | [facility] |
+--------------+-----------------------+
| Address      | Unknown               |
+--------------+-----------------------+
| Phone        | Unavailable           |
+--------------+-----------------------+
 
 
 
 Support
 
 
+---------------+--------------+---------------------+-----------------+
| Name          | Relationship | Address             | Phone           |
+---------------+--------------+---------------------+-----------------+
| Anoop Villalobos | ECON         | [place], | [phone] |
|               |              |  [place]  [address]     |                 |
+---------------+--------------+---------------------+-----------------+
| Jennifer Dickson | ECON         | [place]       | [phone] |
|               |              | [id number]               |                 |
+---------------+--------------+---------------------+-----------------+
 
 
 
 
 Care Team Providers
 
 
+-----------------------+------+-----------------+
| Care Team Member Name | Role | Phone           |
+-----------------------+------+-----------------+
| Ivy Couch DO      | PCP  | [phone] |
+-----------------------+------+-----------------+
 
 
 
 Encounter Details
 
 
+--------+-------------+----------------------+---------------------+-------------+
| Date   | Type        | Department           | Care Team           | Description |
+--------+-------------+----------------------+---------------------+-------------+
| / | Orders Only |   Dominic Burnham    |   Jessica Marley,  |             |
| 2019   |             | [facility]  | RDMS                |             |
|        |             | Ultrasound  [address]      |                     |             |
|        |             | [address]           |                     |             |
|        |             | [place] [address]   |                     |             |
|        |             | [phone]         |                     |             |
+--------+-------------+----------------------+---------------------+-------------+
 
 
 
 Social History
 
 
+---------------+------------+-----------+--------+------------------+
| Tobacco Use   | Types      | Packs/Day | Years  | Date             |
|               |            |           | Used   |                  |
+---------------+------------+-----------+--------+------------------+
| Former Smoker | Cigarettes | 1         | 30     | Quit: 2004 |
+---------------+------------+-----------+--------+------------------+
 
 
 
+---------------------+---+---+---+
| Smokeless Tobacco:  |   |   |   |
| Never Used          |   |   |   |
+---------------------+---+---+---+
 
 
 
+------------------------------+
| Comments: quite smoking 2004 |
+------------------------------+
 
 
 
+-------------+-----------+---------+----------+
| Alcohol Use | Drinks/We | oz/Week | Comments |
|             | ek        |         |          |
+-------------+-----------+---------+----------+
| No          |           |         |          |
+-------------+-----------+---------+----------+
 
 
 
 
+------------------+---------------+
| Sex Assigned at  | Date Recorded |
| Birth            |               |
+------------------+---------------+
| Not on file      |               |
+------------------+---------------+
 as of this encounter
 
 Plan of Treatment
 
 
+--------+----------+-----------------+----------------------+-------------+
| Date   | Type     | Specialty       | Care Team            | Description |
+--------+----------+-----------------+----------------------+-------------+
| / | Initial  | General Surgery |   Сергей Medeiros, |             |
| 2019   | consult  |                 |  [address]  |             |
|        |          |                 |  [place] [address]  |             |
|        |          |                 |  [phone]        |             |
|        |          |                 | [phone] (Fax)   |             |
+--------+----------+-----------------+----------------------+-------------+
 as of this encounter
 
 Visit Diagnoses
 Not on filein this encounter

## 2019-04-19 NOTE — XMS
Encounter Summary
  Created on: 2019
 
 Cherie Villalobos
 External Reference #: [id number]
 : 48
 Sex: Female
 
 Demographics
 
 
+-----------------------+--------------------------+
| Address               | [address]               |
|                       | [place]  [address] |
+-----------------------+--------------------------+
| Home Phone            | [phone]          |
+-----------------------+--------------------------+
| Preferred Language    | Unknown                  |
+-----------------------+--------------------------+
| Marital Status        |                   |
+-----------------------+--------------------------+
| Baptism Affiliation | 1013                     |
+-----------------------+--------------------------+
| Race                  | Unknown                  |
+-----------------------+--------------------------+
| Ethnic Group          | Unknown                  |
+-----------------------+--------------------------+
 
 
 Author
 
 
+--------------+-----------------------+
| Author       | Sherry [employer] |
+--------------+-----------------------+
| Organization | [facility] |
+--------------+-----------------------+
| Address      | Unknown               |
+--------------+-----------------------+
| Phone        | Unavailable           |
+--------------+-----------------------+
 
 
 
 Support
 
 
+---------------+--------------+---------------------+-----------------+
| Name          | Relationship | Address             | Phone           |
+---------------+--------------+---------------------+-----------------+
| Anoop Villalobos | ECON         | [place], | [phone] |
|               |              |  [place]  [address]     |                 |
+---------------+--------------+---------------------+-----------------+
| Jennifer Dickson | ECON         | [place]       | [phone] |
|               |              | [id number]               |                 |
+---------------+--------------+---------------------+-----------------+
 
 
 
 
 Care Team Providers
 
 
+-----------------------+------+-----------------+
| Care Team Member Name | Role | Phone           |
+-----------------------+------+-----------------+
| Ivy Couch DO      | PCP  | [phone] |
+-----------------------+------+-----------------+
 
 
 
 Reason for Visit
 
 
+--------+---------------------------------------------------+
| Reason | Comments                                          |
+--------+---------------------------------------------------+
| [facility]  | 2019-[facility] Provider Eyad Tao Note |
+--------+---------------------------------------------------+
 
 
 
 Encounter Details
 
 
+--------+-------------+----------------------+----------------------+----------------------
+
| Date   | Type        | Department           | Care Team            | Description          
|
+--------+-------------+----------------------+----------------------+----------------------
+
| 04/10/ | Documentati |   [facility]      |   João Asher MD  | Other (March         
|
| 2019   | on Only     | [address]        |  [address]   | [facility] 
|
|        |             | [address]   | [address]  [place], [place]    |  [address] [address]   
|
|        |             | [place] [address]   | [address]  [phone]  | Note)                
|
|        |             | [phone]         |  [phone] (Fax)  |                      
|
+--------+-------------+----------------------+----------------------+----------------------
+
 
 
 
 Social History
 
 
+---------------+------------+-----------+--------+------------------+
| Tobacco Use   | Types      | Packs/Day | Years  | Date             |
|               |            |           | Used   |                  |
+---------------+------------+-----------+--------+------------------+
| Former Smoker | Cigarettes | 1         | 30     | Quit: 2004 |
+---------------+------------+-----------+--------+------------------+
 
 
 
+---------------------+---+---+---+
 
| Smokeless Tobacco:  |   |   |   |
| Never Used          |   |   |   |
+---------------------+---+---+---+
 
 
 
+------------------------------+
| Comments: quite smoking  |
+------------------------------+
 
 
 
+-------------+-----------+---------+----------+
| Alcohol Use | Drinks/We | oz/Week | Comments |
|             | ek        |         |          |
+-------------+-----------+---------+----------+
| No          |           |         |          |
+-------------+-----------+---------+----------+
 
 
 
+------------------+---------------+
| Sex Assigned at  | Date Recorded |
| Birth            |               |
+------------------+---------------+
| Not on file      |               |
+------------------+---------------+
 as of this encounter
 
 Plan of Treatment
 
 
+--------+----------+-----------------+----------------------+-------------+
| Date   | Type     | Specialty       | Care Team            | Description |
+--------+----------+-----------------+----------------------+-------------+
| / | Initial  | General Surgery |   Сергей Medeiros, |             |
|    | consult  |                 |  [address]  |             |
|        |          |                 |  [place] [address]  |             |
|        |          |                 |  [phone]        |             |
|        |          |                 | [phone] (Fax)   |             |
+--------+----------+-----------------+----------------------+-------------+
 as of this encounter
 
 Visit Diagnoses
 Not on filein this encounter

## 2019-04-19 NOTE — XMS
Encounter Summary
  Created on: 2019
 
 Cherie Villalobos
 External Reference #: [id number]
 : 48
 Sex: Female
 
 Demographics
 
 
+-----------------------+--------------------------+
| Address               | [address]               |
|                       | [place]  [address] |
+-----------------------+--------------------------+
| Home Phone            | [phone]          |
+-----------------------+--------------------------+
| Preferred Language    | Unknown                  |
+-----------------------+--------------------------+
| Marital Status        |                   |
+-----------------------+--------------------------+
| Buddhist Affiliation | 1013                     |
+-----------------------+--------------------------+
| Race                  | Unknown                  |
+-----------------------+--------------------------+
| Ethnic Group          | Unknown                  |
+-----------------------+--------------------------+
 
 
 Author
 
 
+--------------+-----------------------+
| Author       | Sherry Clipyoo |
+--------------+-----------------------+
| Organization | [facility] |
+--------------+-----------------------+
| Address      | Unknown               |
+--------------+-----------------------+
| Phone        | Unavailable           |
+--------------+-----------------------+
 
 
 
 Support
 
 
+---------------+--------------+---------------------+-----------------+
| Name          | Relationship | Address             | Phone           |
+---------------+--------------+---------------------+-----------------+
| Anoop Villalobos | ECON         | [place], | [phone] |
|               |              |  [place]  [address]     |                 |
+---------------+--------------+---------------------+-----------------+
| Jennifer Dickson | ECON         | [place]       | [phone] |
|               |              | [phone]               |                 |
+---------------+--------------+---------------------+-----------------+
 
 
 
 
 Care Team Providers
 
 
+-----------------------+------+-----------------+
| Care Team Member Name | Role | Phone           |
+-----------------------+------+-----------------+
| Ivy Couch DO      | PCP  | [phone] |
+-----------------------+------+-----------------+
 
 
 
 Reason for Visit
 
 
+---------------------+----------+
| Reason              | Comments |
+---------------------+----------+
| Multiple Falls      |          |
+---------------------+----------+
| Circulatory Problem |          |
+---------------------+----------+
| Referral            |          |
+---------------------+----------+
 
 
 
 Encounter Details
 
 
+--------+-----------+----------------------+----------------------+-------------+
| Date   | Type      | Department           | Care Team            | Description |
+--------+-----------+----------------------+----------------------+-------------+
| / | Emergency |   [facility]    |   Chau Deleon,  |             |
|    |           | [facility]       | MD [address]   |             |
|        |           | Emergency Department | EMERGENCY DEPARTMENT |             |
|        |           |   [address]     |   [place] [address] |             |
|        |           | [place] [address]   |   [phone]       |             |
|        |           | [phone]         | [phone] (Fax)   |             |
+--------+-----------+----------------------+----------------------+-------------+
 
 
 
 Social History
 
 
+---------------+------------+-----------+--------+------------------+
| Tobacco Use   | Types      | Packs/Day | Years  | Date             |
|               |            |           | Used   |                  |
+---------------+------------+-----------+--------+------------------+
| Former Smoker | Cigarettes | 1         | 30     | Quit: 2004 |
+---------------+------------+-----------+--------+------------------+
 
 
 
+---------------------+---+---+---+
| Smokeless Tobacco:  |   |   |   |
| Never Used          |   |   |   |
+---------------------+---+---+---+
 
 
 
 
+------------------------------+
| Comments: quite smoking  |
+------------------------------+
 
 
 
+-------------+-----------+---------+----------+
| Alcohol Use | Drinks/We | oz/Week | Comments |
|             | ek        |         |          |
+-------------+-----------+---------+----------+
| No          |           |         |          |
+-------------+-----------+---------+----------+
 
 
 
+------------------+---------------+
| Sex Assigned at  | Date Recorded |
| Birth            |               |
+------------------+---------------+
| Not on file      |               |
+------------------+---------------+
 as of this encounter
 
 Last Filed Vital Signs
 
 
+-------------------+----------------------+-------------------------+
| Vital Sign        | Reading              | Time Taken              |
+-------------------+----------------------+-------------------------+
| Blood Pressure    | 137/63               | 2019  4:05 PM PST |
+-------------------+----------------------+-------------------------+
| Pulse             | 87                   | 2019  4:05 PM PST |
+-------------------+----------------------+-------------------------+
| Temperature       | 36.3   C (97.3   F)  | 2019  4:05 PM PST |
+-------------------+----------------------+-------------------------+
| Respiratory Rate  | 16                   | 2019  4:05 PM PST |
+-------------------+----------------------+-------------------------+
| Oxygen Saturation | -                    | -                       |
+-------------------+----------------------+-------------------------+
| Inhaled Oxygen    | -                    | -                       |
| Concentration     |                      |                         |
+-------------------+----------------------+-------------------------+
| Weight            | 65.6 kg (144 lb 11.7 | 2019  4:05 PM PST |
|                   |  oz)                 |                         |
+-------------------+----------------------+-------------------------+
| Height            | -                    | -                       |
+-------------------+----------------------+-------------------------+
| Body Mass Index   | 20.77                | 2019  4:05 PM PST |
+-------------------+----------------------+-------------------------+
 in this encounter
 
 Medications at Time of Discharge
 
 
+----------------------+----------------------+--------+---------+----------+-----------+
| Medication           | Sig.                 | Disp.  | Refills | Start    | End Date  |
|                      |                      |        |         | Date     |           |
+----------------------+----------------------+--------+---------+----------+-----------+
 
|   albuterol          | Inhale 2 puffs into  |        |         |          |           |
| (PROVENTIL           | the lungs every 4    |        |         |          |           |
| HFA;VENTOLIN HFA)    | (four) hours as      |        |         |          |           |
| 108 (90 Base)        | needed for Wheezing. |        |         |          |           |
| MCG/ACT              |                      |        |         |          |           |
| inhalerIndications:  |                      |        |         |          |           |
| states uses 2x       |                      |        |         |          |           |
| monthly average      |                      |        |         |          |           |
+----------------------+----------------------+--------+---------+----------+-----------+
|   apixaban (ELIQUIS) | Take 1 tablet by     |   60   | 0       | 20 |           |
|  2.5 MG tablet       | mouth 2 (two) times  | tablet |         | 18       |           |
|                      | daily.               |        |         |          |           |
+----------------------+----------------------+--------+---------+----------+-----------+
|   atorvastatin       | Take 80 mg by mouth  |        |         | 20 |           |
| (LIPITOR) 80 MG      | nightly.             |        |         | 19       |           |
| tablet               |                      |        |         |          |           |
+----------------------+----------------------+--------+---------+----------+-----------+
|   carvedilol (COREG) | Take 1 tablet by     |   60   | 0       | 20 |  |
|  12.5 MG tablet      | mouth 2 (two) times  | tablet |         | 19       | 0         |
|                      | daily with meals.    |        |         |          |           |
+----------------------+----------------------+--------+---------+----------+-----------+
|   esomeprazole       | Take 1 capsule by    |        |         |          |           |
| (NEXIUM) 40 MG       | mouth every day      |        |         |          |           |
| capsule              |                      |        |         |          |           |
+----------------------+----------------------+--------+---------+----------+-----------+
|                      | Take 1 tablet by     |   30   | 0       | 20 |           |
| HYDROcodone-acetamin | mouth every 4 (four) | tablet |         | 19       |           |
| ophen (NORCO) 5-325  |  hours as needed.    |        |         |          |           |
| MG per tablet        |                      |        |         |          |           |
+----------------------+----------------------+--------+---------+----------+-----------+
|   insulin aspart     | Inject  into the     |        |         |          |           |
| (NOVOLOG) 100        | skin 3 (three) times |        |         |          |           |
| UNIT/ML injection    |  daily before meals. |        |         |          |           |
|                      |  Sliding scale       |        |         |          |           |
+----------------------+----------------------+--------+---------+----------+-----------+
|   insulin degludec   | Inject 21 units      |        |         |          |           |
| (TRESIBA) 100        | every night          |        |         |          |           |
| UNIT/ML injection    |                      |        |         |          |           |
+----------------------+----------------------+--------+---------+----------+-----------+
|   isosorbide         | Take 1 tablet by     |   30   | 1       | 20 |  |
| mononitrate (IMDUR)  | mouth daily.         | tablet |         | 18       | 9         |
| 60 MG 24 hr tablet   |                      |        |         |          |           |
+----------------------+----------------------+--------+---------+----------+-----------+
|   losartan (COZAAR)  | Take 100 mg by mouth |        |         | 20 |           |
| 100 MG tablet        |  daily.              |        |         | 19       |           |
+----------------------+----------------------+--------+---------+----------+-----------+
|   nitroGLYCERIN      | Place 1 tablet under |   30   | 0       | 20 |  |
| (NITROSTAT) 0.4 MG   |  the tongue every 5  | tablet |         | 19       | 0         |
| SL tablet            | (five) minutes as    |        |         |          |           |
|                      | needed for Chest     |        |         |          |           |
|                      | pain.                |        |         |          |           |
+----------------------+----------------------+--------+---------+----------+-----------+
|   nortriptyline      | Take 25-75 mg by     |        |         |          |           |
| (PAMELOR) 25 MG      | mouth See Admin      |        |         |          |           |
| capsule              | Instructions. Takes  |        |         |          |           |
|                      | 25 mg by mouth every |        |         |          |           |
|                      |  morning and 75 mg   |        |         |          |           |
|                      | every night          |        |         |          |           |
+----------------------+----------------------+--------+---------+----------+-----------+
|   ondansetron        | Take 4 mg by mouth   |        |         | 20 |           |
 
| (ZOFRAN-ODT) 4 MG    | every 8 (eight)      |        |         | 18       |           |
| disintegrating       | hours as needed.     |        |         |          |           |
| tablet               |                      |        |         |          |           |
+----------------------+----------------------+--------+---------+----------+-----------+
|   oxybutynin         | Take 7.5 mg by mouth |        |         |          |           |
| (DITROPAN) 5 MG      |  2 (two) times       |        |         |          |           |
| tablet               | daily.               |        |         |          |           |
+----------------------+----------------------+--------+---------+----------+-----------+
|   prochlorperazine 5 | TAKE ONE TABLET BY   |   60   | 11      | 20 |           |
|  MG tablet           | MOUTH TWICE DAILY AS | tablet |         | 19       |           |
|                      |  NEEDED FOR NAUSEA   |        |         |          |           |
+----------------------+----------------------+--------+---------+----------+-----------+
|   rOPINIRole         | Take 0.75 mg by      |        |         |          |           |
| (REQUIP) 0.25 MG     | mouth nightly.       |        |         |          |           |
| tablet               |                      |        |         |          |           |
+----------------------+----------------------+--------+---------+----------+-----------+
|   TRINTELLIX 20 MG   | Take 20 mg by mouth  |        |         | 20 |           |
| TABS                 | every evening.       |        |         | 19       |           |
+----------------------+----------------------+--------+---------+----------+-----------+
|   albuterol          | Ventolin HFA 90      |        |         |          |  |
| (VENTOLIN HFA) 108   | mcg/actuation        |        |         |          | 9         |
| (90 Base) MCG/ACT    | aerosol inhaler      |        |         |          |           |
| inhaler              | Inhale 2 puffs every |        |         |          |           |
|                      |  4 hours by          |        |         |          |           |
|                      | inhalation route as  |        |         |          |           |
|                      | needed.              |        |         |          |           |
+----------------------+----------------------+--------+---------+----------+-----------+
|   atorvastatin       | Take 1 tablet by     |   30   | 0       | 20 |  |
| (LIPITOR) 40 MG      | mouth nightly.       | tablet |         | 19       | 9         |
| tablet               |                      |        |         |          |           |
+----------------------+----------------------+--------+---------+----------+-----------+
|   bisacodyl          | Place 10 mg          |        |         |          |  |
| (DULCOLAX) 10 MG     | rectally.            |        |         |          | 9         |
| suppository          |                      |        |         |          |           |
+----------------------+----------------------+--------+---------+----------+-----------+
|   calcium acetate    | 667 mg 3 (three)     |        |         | 20 |  |
| (PHOSLO) 667 MG      | times daily with     |        |         | 16       | 9         |
| capsule              | meals.               |        |         |          |           |
+----------------------+----------------------+--------+---------+----------+-----------+
|   Cholecalciferol    | Take 5,000 capsules  |        |         |          |  |
| (VITAMIN D3) 5000    | by mouth every other |        |         |          | 9         |
| UNITS CAPS           |  day.                |        |         |          |           |
+----------------------+----------------------+--------+---------+----------+-----------+
|   clopidogrel        | Take 1 tablet by     |   30   | 11      | 20 |  |
| (PLAVIX) 75 MG       | mouth daily.         | tablet |         | 18       | 9         |
| tablet               |                      |        |         |          |           |
+----------------------+----------------------+--------+---------+----------+-----------+
|   loperamide         | 2 mg as needed.      |        |         |          |  |
| (IMODIUM) 2 MG       |                      |        |         |          | 9         |
| capsule              |                      |        |         |          |           |
+----------------------+----------------------+--------+---------+----------+-----------+
|   LORazepam (ATIVAN) | Take 1 tablet by     |   30   | 0       | 20 | 02/15/201 |
|  1 MG tablet         | mouth every 8        | tablet |         | 19       | 9         |
|                      | (eight) hours as     |        |         |          |           |
|                      | needed for Anxiety.  |        |         |          |           |
+----------------------+----------------------+--------+---------+----------+-----------+
|   Magnesium          | Take 1 tablet by     |        |         |          |  |
| Cl-Calcium Carbonate | mouth every other    |        |         |          | 9         |
|  (SLOW-MAG) 71.5-119 | day.                 |        |         |          |           |
|  MG TBEC             |                      |        |         |          |           |
 
+----------------------+----------------------+--------+---------+----------+-----------+
|   ranitidine         | ranitidine 150 mg    |        |         |          |  |
| (ZANTAC) 150 MG      | tablet Take 1 tablet |        |         |          | 9         |
| tablet               |  twice a day by oral |        |         |          |           |
|                      |  route.              |        |         |          |           |
+----------------------+----------------------+--------+---------+----------+-----------+
|   Vortioxetine HBr   | 10 mg nightly.       |        |         |          |  |
| 10 MG TABS           |                      |        |         |          | 9         |
+----------------------+----------------------+--------+---------+----------+-----------+
 as of this encounter
 
 Plan of Treatment
 
 
+--------+----------+-----------------+----------------------+-------------+
| Date   | Type     | Specialty       | Care Team            | Description |
+--------+----------+-----------------+----------------------+-------------+
| / | Initial  | General Surgery |   Сергей Medeiros, |             |
| 2019   | consult  |                 |  MD [address]  |             |
|        |          |                 |  [place] [address]  |             |
|        |          |                 |  [phone]        |             |
|        |          |                 | [phone] (Fax)   |             |
+--------+----------+-----------------+----------------------+-------------+
 as of this encounter
 
 Procedures
 
 
+-----------------+--------+-------------+----------------------+----------------------+
| Procedure Name  | Priori | Date/Time   | Associated Diagnosis | Comments             |
|                 | ty     |             |                      |                      |
+-----------------+--------+-------------+----------------------+----------------------+
| ED INFORMATION  | Routin | 2019  |                      |   Results for this   |
| EXCHANGE        | e      |  3:58 PM    |                      | procedure are in the |
|                 |        | PST         |                      |  results section.    |
+-----------------+--------+-------------+----------------------+----------------------+
 in this encounter
 
 Results
 ED INFORMATION EXCHANGE (2019  3:58 PM)
 
+------------------------------------------------------------------------+--------------+
| Narrative                                                              | Performed At |
+------------------------------------------------------------------------+--------------+
|   [id number] [id number]     Criteria Met         Care        |   ED         |
| Guidelines      10 in      Security and Safety  No recent Security   | INFORMATION  |
| Events currently on file     ED Care Guidelines from [facility] -        | EXCHANGE     |
| [place]  Last Updated: 18 10:16 AM         Other Information:    |              |
| Currently being seen by [facility] in [place] for mental health        |              |
| [phone]  These are guidelines and the provider should    |              |
| exercise clinical judgment when providing care.  ED Care Guidelines    |              |
| from [facility]  Last Updated: 17 10:44 AM         Care |              |
|  Coordination:  This patient has been identified as having at          |              |
| leastEmergency Departments visits in the 12 months immediately         |              |
| preceding the date these guidelines were entered.Patient requires      |              |
| education on appropriate ED usage.Emphasize the importance of using    |              |
| outpatient   medical services for the treatment of chronic conditions. |              |
|   Please contact [facility] at [phone] if   |              |
| patient is seen in ED.  These are guidelines and the provider should   |              |
| exercise clinical judgment when providing care.  These are guidelines  |              |
 
| and the provider should exercise clinical judgment when providing      |              |
| care.           Prescription Drug Report (12 Mo.)  PDMP query found no |              |
|  report.        E.D. Visit Count (12 mo.)  Facility Visits Low Acuity  |              |
|   [facility] 18 0   [facility] 2 0   [facility]   |              |
| [facility] 4 0   Total 24 0   Note: Visits indicate total |              |
|  known visits. Medicaid Low Acuity Dx are the number of primary        |              |
| diagnoses on the Medicaid's Low Acuity dx list.         Recent         |              |
| Emergency Department Visit Summary  Showing 10 most recent visits out  |              |
| of 24 in the past 12 months  Date [facility] State Type Diagnoses   |              |
| or Chief Complaint   [facility]       |              |
| Emergency          Multiple Falls        Referral        Circulatory   |              |
| Problem      2019 [facility] [place] Emergency      |              |
|      ABD PAIN        Other chest pain      2019 [facility]         |              |
| [facility] Emergency          Foot Pain      2019 |              |
|  [facility] Emergency          Chest Pain          |              |
| Nausea        Shortness of Breath        Chest pain, unspecified       |              |
|      Elevated blood-pressure reading, without diagnosis of             |              |
| hypertension        Presence of aortocoronary bypass graft             |              |
| Other specified postprocedural states      2019 [facility]  |              |
| [facility] [place] Emergency          CHEST PAIN        Abnormal levels  |              |
| of other serum enzymes        Personal history of other diseases of    |              |
| the circulatory system        Chest pain, unspecified      2019 |              |
|  [facility] [place] Emergency          FISTULA BLEEDING    |              |
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
| grafts, initial encounter      Dec 22, 2018 [facility]       |              |
| [place] Emergency          CHEST PAIN        Type 2 diabetes         |              |
| mellitus with hyperglycemia        Chest pain, unspecified      Nov    |              |
| 2018 [facility] [place] Emergency    Chief Complaint:   |              |
| SOB      2018 [facility] [place] [facility]         |              |
|     FALL        Unspecified fall, initial encounter        Dependence  |              |
| on renal dialysis        End stage renal disease      2018     |              |
| [facility] [place] Emergency          FALL                 |              |
| Essential (primary) hypertension        Type 2 diabetes mellitus with  |              |
| hyperglycemia        Type 2 diabetes mellitus with unspecified         |              |
| complications        Unspecified fall, initial encounter               |              |
| Headache            Recent Inpatient Visit Summary  Showing 10 most    |              |
| recent visits out of 11 in the past 12 months  Date [facility]      |              |
| State Type Diagnoses or Chief Complaint   2019 [facility] |              |
|  [place] Vascular Surgery          Foot Pain        End stage   |              |
| renal disease        Dependence on renal dialysis        Peripheral    |              |
| vascular disease, unspecified        Anemia in chronic kidney disease  |              |
|        Other disorders of plasma-protein metabolism, not elsewhere     |              |
| classified        Other specified personal risk factors, not elsewhere |              |
|  classified      Dec 27, 2018 [facility] [place]           |              |
| Recovery          Peripheral arterial disease, osteomyelitis left foot |              |
|         Other acute osteomyelitis, left ankle and foot        Long     |              |
| term (current) use of antibiotics        End stage renal disease       |              |
|      Anemia in chronic kidney disease      Dec 5, 2018 [facility] |              |
[facility]          Peripheral vascular disease, |              |
|  unspecified        End stage renal disease        Dependence on renal |              |
|  dialysis        Long term (current) use of insulin        Other       |              |
| chronic osteomyelitis, left ankle and foot        Type 2 diabetes      |              |
| mellitus with diabetic chronic kidney disease        Essential         |              |
| (primary) hypertension      [facility] [place] [place] [facility]          [facility]        Other chronic osteomyelitis,    |              |
| unspecified ankle and foot        End stage renal disease        Other |              |
|  specified personal risk factors, not elsewhere classified             |              |
| Chronic systolic (congestive) heart failure        Anemia in chronic   |              |
| kidney disease        Other disorders of plasma-protein metabolism,    |              |
| not elsewhere classified        Moderate protein-calorie malnutrition  |              |
 
|        Other disorders of phosphorus metabolism      2018      |              |
| [place]. [place] Medical Surgical          1. Type 2      |              |
| diabetes mellitus with other specified complication        2. Urinary  |              |
| tract infection, site not specified        3. Unspecified              |              |
| protein-calorie malnutrition        4. Other chronic osteomyelitis,    |              |
| unspecified site        5. Hypertensive heart and chronic kidney       |              |
| disease with heart failure and with stage 5 chronic kidney disease, or |              |
|  end stage renal disease        6. Chronic diastolic (congestive)      |              |
| heart failure        7. Other osteomyelitis, ankle and foot        8.  |              |
| Type 2 diabetes mellitus with foot ulcer        9. Atherosclerotic     |              |
| heart disease of native coronary artery without angina pectoris        |              |
|  10. Chronic obstructive pulmonary disease, unspecified      ,    |              |
[facility] [place] [place] [facility]          1. Benign |              |
|  paroxysmal vertigo, unspecified ear        2. End stage renal disease |              |
|         3. Hypertensive chronic kidney disease with stage 5 chronic    |              |
| kidney disease or end stage renal disease        4. Urinary tract      |              |
| infection, site not specified        5. Hypertensive heart and chronic |              |
|  kidney disease with heart failure and with stage 5 chronic kidney     |              |
| disease, or end stage renal disease        6. Kidney transplant status |              |
|         7. Unspecified systolic (congestive) heart failure        8.   |              |
| Syncope and collapse        9. Type 2 diabetes mellitus with diabetic  |              |
| chronic kidney disease        10. Atherosclerotic heart disease of     |              |
| native coronary artery without angina pectoris      Sep 15, 2018       |              |
| [facility] [place] [facility]          Acute     |              |
| ischemic heart disease, unspecified        Long term (current) use of  |              |
| insulin        Dependence on renal dialysis        End stage renal     |              |
| disease        Type 2 diabetes mellitus with diabetic chronic kidney   |              |
| disease        Essential (primary) hypertension        Anemia in       |              |
| chronic kidney disease      2018 [facility] [place]. [place] |              |
|  Medical Surgical          0. Cough        1. Pneumonia due to         |              |
| Pseudomonas        2. End stage renal disease        3. Hypertensive   |              |
| heart and chronic kidney disease with heart failure and with stage 5   |              |
| chronic kidney disease, or end stage renal disease        4. Cachexia  |              |
|        5. Chronic obstructive pulmonary disease with acute lower       |              |
| respiratory infection        6. Type 2 diabetes mellitus with diabetic |              |
|  chronic kidney disease        7. Heart failure, unspecified        8. |              |
|  Hyperlipidemia, unspecified        9. Gastro-esophageal reflux        |              |
| disease without esophagitis      2018 [facility]       |              |
| [facility]. [place] Medical Surgical          0. Other chest pain        1.      |              |
| Gastro-esophageal reflux disease without esophagitis        2. End     |              |
| stage renal disease        3. Hypertensive heart and chronic kidney    |              |
| disease with heart failure and with stage 5 chronic kidney disease, or |              |
|  end stage renal disease        4. Chronic systolic (congestive) heart |              |
|  failure        5. Atherosclerotic heart disease of native coronary    |              |
| artery with other forms of angina pectoris        6. Type 2 diabetes   |              |
| mellitus with diabetic chronic kidney disease        7.                |              |
| Hyperlipidemia, unspecified        8. Major depressive disorder,       |              |
| single episode, unspecified        9. Chronic obstructive pulmonary    |              |
| disease, unspecified      Mar 6, 2018 [facility]     |              |
| [facility] Card[facility]          Heart Failure        Need for iv access  |              |
|        Type 2 diabetes mellitus with other specified complication      |              |
|      Long term (current) use of insulin        Paroxysmal atrial       |              |
| fibrillation        Anemia in chronic kidney disease                   |              |
| Atherosclerotic heart disease of native coronary artery without angina |              |
|  pectoris        Cardiomyopathy, unspecified        End stage renal    |              |
| disease        Other specified postprocedural states            Care   |              |
| Providers  Provider PRC Type Phone Fax Service Dates   HEADINGS, SANTIAGO, |              |
|  F.N.P. Nurse Practitioner: Family     Current      Marco Antonio Martinez     |              |
| /Care Coordinator [phone]    2019 -          |              |
| Current      Marco Antonio Martinez Primary Care [phone]    2019 |              |
 
|  - Current      [facility] Primary Care    ([phone])    |              |
| [phone] Current      [facility] Primary     |              |
| Care     Current      MANOLO GONZALEZ Primary Care     Current            |              |
| [employer] Mental Health Provider [phone]     |              |
| Current      [employer] Case or Care Manager     Current      [facility]   |              |
| MIRTA Sr Case or Care Manager [phone]     |              |
| [phone] Current      Jairo Gutierrez MD Other     Current           |              |
| [employer] Portal  This patient has registered at the [facility]  |              |
| [facility] Emergency Department   For more information visit:      |              |
| [email or web address] |              |
| [id number]   The above information is provided for the sole purpose of     |              |
| patient treatment. Use of this information beyond the terms of Data    |              |
| Sharing Memorandum of Understanding and License Agreement is           |              |
| prohibited. In certain cases not all visits may be represented.        |              |
| Consult the aforementioned facilities for additional information.      |              |
| 2019 [employer] - [place], [place] -      |              |
| [email or web address]                                         |              |
+------------------------------------------------------------------------+--------------+
 
 
 
+-------------------------------------------------------------------------------------------
--------------------------------------------------------------------------------------------
--------------------+
| Procedure Note                                                                            
                                                                                            
                    |
+-------------------------------------------------------------------------------------------
--------------------------------------------------------------------------------------------
--------------------+
|   Interface, Lab - 2019  4:00 PM PST  Formatting of this note may be different      
                                                                                            
                    |
| from the original.[id number] [id number] Met  Care Guidelines  10 in     
                                                                                            
                    |
| 12Security and SafetyNo recent Security Events currently on fileED Care Guidelines from   
                                                                                            
                    |
| N[place]  HollycierraAlex Updated: 18 10:16 AM  Other Information:Currently being      
                                                                                            
                    |
| seen by [facility] in [place] for mental health concerns.[phone] are            
                                                                                            
                    |
| guidelines and the provider should exercise clinical judgment when providing care.ED      
                                                                                            
                    |
| Care Guidelines from [facility] Updated: 17 10:44 AM  Care             
                                                                                            
                    |
| Coordination:This patient has been identified as having at leastEmergency Departments     
                                                                                            
                    |
| visits in the 12 months immediately preceding the date these guidelines were              
                                                                                            
                    |
| entered.Patient requires education on appropriate ED usage.Emphasize the importance of    
                                                                                            
                    |
 
| using outpatient medical services for the treatment of chronic conditions.Please contact  
                                                                                            
                    |
|  Community Health WorkerWillard at [phone] if patient is seen in ED.These are      
                                                                                            
                    |
| guidelines and the provider should exercise clinical judgment when providing care.These   
                                                                                            
                    |
| are guidelines and the provider should exercise clinical judgment when providing          
                                                                                            
                    |
| care.Prescription Drug Report (12 Mo.)PDMP query found no report.E.D. Visit Count (12     
                                                                                            
                    |
| mo.)Facility Visits Low Acuity [facility] 18 0 [facility] 2 0    
                                                                                            
                    |
| [facility] 4 0 Total 24 0 Note: Visits indicate total known visits.   
                                                                                            
                    |
| Medicaid Low Acuity Dx are the number of primary diagnoses on the Medicaid's Low Acuity   
                                                                                            
                    |
| dx list.  Recent Emergency Department Visit SummaryShowing 10 most recent visits out of   
                                                                                            
                    |
| 24 in the past 12 monthsDate Facility City State Type Diagnoses or Chief Complaint Feb    
                                                                                            
                    |
| 2019 [facility] [place] Emergency    Multiple Falls    Referral           
                                                                                            
                    |
| Circulatory Problem  2019 [facility]. [place] Emergency    ABD PAIN     
                                                                                            
                    |
|  Other chest pain  2019 [facility] [place] Emergency    Foot Pain     
                                                                                            
                    |
| 2019 [facility] [place] Emergency    Chest Pain    Nausea             
                                                                                            
                    |
| Shortness of Breath    Chest pain, unspecified    Elevated blood-pressure reading,        
                                                                                            
                    |
| without diagnosis of hypertension    Presence of aortocoronary bypass graft    Other      
                                                                                            
                    |
| specified postprocedural states  2019 [facility] [place] Emergency    
                                                                                            
                    |
|   CHEST PAIN    Abnormal levels of other serum enzymes    Personal history of other       
                                                                                            
                    |
| diseases of the circulatory system    Chest pain, unspecified  2019 [facility]  
                                                                                            
                    |
|  [facility] [place]. [place] Emergency    FISTULA BLEEDING    Hemorrhage due to vascular prosthetic  
                                                                                            
                    |
 
|  devices, implants and grafts, initial encounter  Dec 22, 2018 [facility]       
                                                                                            
                    |
| [place]. [place] Emergency    CHEST PAIN    Type 2 diabetes mellitus with hyperglycemia          
                                                                                            
                    |
| Chest pain, unspecified  2018 [facility] [place] [place] Emergency  Chief      
                                                                                            
                    |
| Complaint: SOB  2018 [facility] [place] Emergency    FALL             
                                                                                            
                    |
| Unspecified fall, initial encounter    Dependence on renal dialysis    End stage renal    
                                                                                            
                    |
| disease  2018 [facility] [place] Emergency    FALL    Essential       
                                                                                            
                    |
| (primary) hypertension    Type 2 diabetes mellitus with hyperglycemia    Type 2 diabetes  
                                                                                            
                    |
|  mellitus with unspecified complications    Unspecified fall, initial encounter           
                                                                                            
                    |
| Headache  Recent Inpatient Visit SummaryShowing 10 most recent visits out of 11 in the    
                                                                                            
                    |
| past 12 monthsDate Facility City State Type Diagnoses or Chief Complaint 2019     
                                                                                            
                    |
| [facility] [place] V[facility]    Foot Pain    End stage renal disease   
                                                                                            
                    |
|    Dependence on renal dialysis    Peripheral vascular disease, unspecified    Anemia in  
                                                                                            
                    |
|  chronic kidney disease    Other disorders of plasma-protein metabolism, not elsewhere    
                                                                                            
                    |
| classified    Other specified personal risk factors, not elsewhere classified  Dec 27,    
                                                                                            
                    |
|  [facility] [place] Recovery    Peripheral arterial disease,              
                                                                                            
                    |
| osteomyelitis left foot    Other acute osteomyelitis, left ankle and foot    Long term    
                                                                                            
                    |
| (current) use of antibiotics    End stage renal disease    Anemia in chronic kidney       
                                                                                            
                    |
| disease  Dec 5, 2018 [facility] [place] General Medicine    Peripheral        
                                                                                            
                    |
| vascular disease, unspecified    End stage renal disease    Dependence on renal dialysis  
                                                                                            
                    |
|     Long term (current) use of insulin    Other chronic osteomyelitis, left ankle and     
                                                                                            
                    |
 
| foot    Type 2 diabetes mellitus with diabetic chronic kidney disease    Essential        
                                                                                            
                    |
| (primary) hypertension  2018 [facility] [place] Inpatient    Upper    
                                                                                            
                    |
| GIB    Other chronic osteomyelitis, unspecified ankle and foot    End stage renal         
                                                                                            
                    |
| disease    Other specified personal risk factors, not elsewhere classified    Chronic     
                                                                                            
                    |
| systolic (congestive) heart failure    Anemia in chronic kidney disease    Other          
                                                                                            
                    |
| disorders of plasma-protein metabolism, not elsewhere classified    Moderate              
                                                                                            
                    |
| protein-calorie malnutrition    Other disorders of phosphorus metabolism  2018    
                                                                                            
                    |
| [place] Medical Surgical    1. Type 2 diabetes mellitus with other  
                                                                                            
                    |
|  specified complication    2. Urinary tract infection, site not specified    3.           
                                                                                            
                    |
| Unspecified protein-calorie malnutrition    4. Other chronic osteomyelitis, unspecified   
                                                                                            
                    |
| site    5. Hypertensive heart and chronic kidney disease with heart failure and with      
                                                                                            
                    |
| stage 5 chronic kidney disease, or end stage renal disease    6. Chronic diastolic        
                                                                                            
                    |
| (congestive) heart failure    7. Other osteomyelitis, ankle and foot    8. Type 2         
                                                                                            
                    |
| diabetes mellitus with foot ulcer    9. Atherosclerotic heart disease of native coronary  
                                                                                            
                    |
|  artery without angina pectoris    10. Chronic obstructive pulmonary disease,             
                                                                                            
                    |
| unspecified  2018 [facility] [place] [facility]    1. Benign      
                                                                                            
                    |
| paroxysmal vertigo, unspecified ear    2. End stage renal disease    3. Hypertensive      
                                                                                            
                    |
| chronic kidney disease with stage 5 chronic kidney disease or end stage renal disease     
                                                                                            
                    |
|  4. Urinary tract infection, site not specified    5. Hypertensive heart and chronic      
                                                                                            
                    |
| kidney disease with heart failure and with stage 5 chronic kidney disease, or end stage   
                                                                                            
                    |
 
| renal disease    6. Kidney transplant status    7. Unspecified systolic (congestive)      
                                                                                            
                    |
| heart failure    8. Syncope and collapse    9. Type 2 diabetes mellitus with diabetic     
                                                                                            
                    |
| chronic kidney disease    10. Atherosclerotic heart disease of native coronary artery     
                                                                                            
                    |
| without angina pectoris  Sep 15, 2018 [facility]. [place] [facility]          
                                                                                            
                    |
| Surgical    Acute ischemic heart disease, unspecified    Long term (current) use of       
                                                                                            
                    |
| insulin    Dependence on renal dialysis    End stage renal disease    Type 2 diabetes     
                                                                                            
                    |
| mellitus with diabetic chronic kidney disease    Essential (primary) hypertension         
                                                                                            
                    |
| Anemia in chronic kidney disease  2018 [facility]. [place] [facility]      
                                                                                            
                    |
| Surgical    0. Cough    1. Pneumonia due to Pseudomonas    2. End stage renal disease     
                                                                                            
                    |
|  3. 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     
                                                                                            
                    |
| chronic kidney disease, or end stage renal disease    4. Cachexia    5. Chronic           
                                                                                            
                    |
| obstructive pulmonary disease with acute lower respiratory infection    6. Type 2         
                                                                                            
                    |
| diabetes mellitus with diabetic chronic kidney disease    7. Heart failure, unspecified   
                                                                                            
                    |
|    8. Hyperlipidemia, unspecified    9. Gastro-esophageal reflux disease without          
                                                                                            
                    |
| esophagitis  2018 [facility] [place] [facility]    0. Other       
                                                                                            
                    |
| chest pain    1. Gastro-esophageal reflux disease without esophagitis    2. End stage     
                                                                                            
                    |
| renal disease    3. Hypertensive heart and chronic kidney disease with heart failure and  
                                                                                            
                    |
|  with stage 5 chronic kidney disease, or end stage renal disease    4. Chronic systolic   
                                                                                            
                    |
| (congestive) heart failure    5. Atherosclerotic heart disease of native coronary artery  
                                                                                            
                    |
|  with other forms of angina pectoris    6. Type 2 diabetes mellitus with diabetic         
                                                                                            
                    |
 
| chronic kidney disease    7. Hyperlipidemia, unspecified    8. Major depressive           
                                                                                            
                    |
| disorder, single episode, unspecified    9. Chronic obstructive pulmonary disease,        
                                                                                            
                    |
| unspecified  Mar 6, 2018 [facility]. [place] [facility]    Heart       
                                                                                            
                    |
| Failure    Need for iv access    Type 2 diabetes mellitus with other specified            
                                                                                            
                    |
| complication    Long term (current) use of insulin    Paroxysmal atrial fibrillation      
                                                                                            
                    |
| Anemia in chronic kidney disease    Atherosclerotic heart disease of native coronary      
                                                                                            
                    |
| artery without angina pectoris    Cardiomyopathy, unspecified    End stage renal disease  
                                                                                            
                    |
|     Other specified postprocedural states  Care ProvidersProvider PRC Type Phone Fax      
                                                                                            
                    |
| Service Dates HEADINGS, CARLOS ALBERTO HENDERSON. Nurse Practitioner: Family   Current  Michelle      
                                                                                            
                    |
| Marco Antonio /Care Coordinator [phone]  2019 - Current  [facility]  
                                                                                            
                    |
|  Primary Care [phone]  2019 - Current  [facility]        
                                                                                            
                    |
| Primary Care  [phone] Current  [facility] Primary Care   
                                                                                            
                    |
|   Current  MANOLO GONZALEZ Primary Care   Current  [facility] Mental [facility] Provider      
                                                                                            
                    |
| [phone] Current  [facility] Case or Care Manager   Current  [facility]  
                                                                                            
                    |
|  MIRTA Sr Case or Care Manager [phone] Current      
                                                                                            
                    |
| Jairo Gutierrez MD Other   Current  [facility] PortalThis patient has registered at     
                                                                                            
                    |
| the [facility] Emergency Department For more information visit:       
                                                                                            
                    |
| [email or web address] The above    
                                                                                            
                    |
| information is provided for the sole purpose of patient treatment. Use of this            
                                                                                            
                    |
| information beyond the terms of Data Sharing Memorandum of Understanding and License      
                                                                                            
                    |
 
| Agreement is prohibited. In certain cases not all visits may be represented. Consult the  
                                                                                            
                    |
|  aforementioned facilities for additional information.  [employer]            
                                                                                            
                    |
| [email or web address] - [place], [place] - [email or web address]                  
                                                                                            
                    |
|   End stage renal disease                                                                 
                                                                                            
                    |
|   Anemia in chronic kidney disease                                                        
                                                                                            
                    |
|                                                                                           
                                                                                            
                    |
|Dec 5, 2018 [facility]                                
                                                                                            
                    |
|  Peripheral vascular disease, unspecified                                                 
                                                                                            
                    |
|   End stage renal disease                                                                 
                                                                                            
                    |
|   Dependence on renal dialysis                                                            
                                                                                            
                    |
|   Long term (current) use of insulin                                                      
                                                                                            
                    |
|   Other chronic osteomyelitis, left ankle and foot                                        
                                                                                            
                    |
|   Type 2 diabetes mellitus with diabetic chronic kidney disease                           
                                                                                            
                    |
|   Essential (primary) hypertension                                                        
                                                                                            
                    |
|                                                                                           
                                                                                            
                    |
|2018 [facility] Inpatient                                      
                                                                                            
                    |
|  Upper GIB                                                                                
                                                                                            
                    |
|   Other chronic osteomyelitis, unspecified ankle and foot                                 
                                                                                            
                    |
|   End stage renal disease                                                                 
                                                                                            
                    |
|   Other specified personal risk factors, not elsewhere classified                         
                                                                                            
                    |
 
|   Chronic systolic (congestive) heart failure                                             
                                                                                            
                    |
|   Anemia in chronic kidney disease                                                        
                                                                                            
                    |
|   Other disorders of plasma-protein metabolism, not elsewhere classified                  
                                                                                            
                    |
|   Moderate protein-calorie malnutrition                                                   
                                                                                            
                    |
|   Other disorders of phosphorus metabolism                                                
                                                                                            
                    |
|                                                                                           
                                                                                            
                    |
|2018 [facility]. [place] [facility]                                
                                                                                            
                    |
|  1. Type 2 diabetes mellitus with other specified complication                            
                                                                                            
                    |
|   2. Urinary tract infection, site not specified                                          
                                                                                            
                    |
|   3. Unspecified protein-calorie malnutrition                                             
                                                                                            
                    |
|   4. Other chronic osteomyelitis, unspecified site                                        
                                                                                            
                    |
|   5. 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
onic kidney disease, or end stage renal disease                                             
                    |
|   6. Chronic diastolic (congestive) heart failure                                         
                                                                                            
                    |
|   7. Other osteomyelitis, ankle and foot                                                  
                                                                                            
                    |
|   8. Type 2 diabetes mellitus with foot ulcer                                             
                                                                                            
                    |
|   9. Atherosclerotic heart disease of native coronary artery without angina pectoris      
                                                                                            
                    |
|   10. Chronic obstructive pulmonary disease, unspecified                                  
                                                                                            
                    |
|                                                                                           
                                                                                            
                    |
[facility] [place] [facility]                                 
                                                                                            
                    |
|  1. Benign paroxysmal vertigo, unspecified ear                                            
                                                                                            
                    |
 
|   2. End stage renal disease                                                              
                                                                                            
                    |
|   3. Hypertensive chronic kidney disease with stage 5 chronic kidney disease or end stage 
renal disease                                                                               
                    |
|   4. Urinary tract infection, site not specified                                          
                                                                                            
                    |
|   5. 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
onic kidney disease, or end stage renal disease                                             
                    |
|   6. Kidney transplant status                                                             
                                                                                            
                    |
|   7. Unspecified systolic (congestive) heart failure                                      
                                                                                            
                    |
|   8. Syncope and collapse                                                                 
                                                                                            
                    |
|   9. Type 2 diabetes mellitus with diabetic chronic kidney disease                        
                                                                                            
                    |
|   10. Atherosclerotic heart disease of native coronary artery without angina pectoris     
                                                                                            
                    |
|                                                                                           
                                                                                            
                    |
|Sep 15, 2018 [facility]. [place] [facility]                           
                                                                                            
                    |
|  Acute ischemic heart disease, unspecified                                                
                                                                                            
                    |
|   Long term (current) use of insulin                                                      
                                                                                            
                    |
|   Dependence on renal dialysis                                                            
                                                                                            
                    |
|   End stage renal disease                                                                 
                                                                                            
                    |
|   Type 2 diabetes mellitus with diabetic chronic kidney disease                           
                                                                                            
                    |
|   Essential (primary) hypertension                                                        
                                                                                            
                    |
|   Anemia in chronic kidney disease                                                        
                                                                                            
                    |
|                                                                                           
                                                                                            
                    |
|2018 [facility]. [place] [facility]                                
                                                                                            
                    |
 
|  0. Cough                                                                                 
                                                                                            
                    |
|   1. Pneumonia due to Pseudomonas                                                         
                                                                                            
                    |
|   2. End stage renal disease                                                              
                                                                                            
                    |
|   3. 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
onic kidney disease, or end stage renal disease                                             
                    |
|   4. Cachexia                                                                             
                                                                                            
                    |
|   5. Chronic obstructive pulmonary disease with acute lower respiratory infection         
                                                                                            
                    |
|   6. Type 2 diabetes mellitus with diabetic chronic kidney disease                        
                                                                                            
                    |
|   7. Heart failure, unspecified                                                           
                                                                                            
                    |
|   8. Hyperlipidemia, unspecified                                                          
                                                                                            
                    |
|   9. Gastro-esophageal reflux disease without esophagitis                                 
                                                                                            
                    |
|                                                                                           
                                                                                            
                    |
|2018 [facility] Cox. [place] [facility]                                 
                                                                                            
                    |
|  0. Other chest pain                                                                      
                                                                                            
                    |
|   1. Gastro-esophageal reflux disease without esophagitis                                 
                                                                                            
                    |
|   2. End stage renal disease                                                              
                                                                                            
                    |
|   3. 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
onic kidney disease, or end stage renal disease                                             
                    |
|   4. Chronic systolic (congestive) heart failure                                          
                                                                                            
                    |
|   5. 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
ctoris                                                                                      
                    |
|   6. Type 2 diabetes mellitus with diabetic chronic kidney disease                        
                                                                                            
                    |
|   7. Hyperlipidemia, unspecified                                                          
                                                                                            
                    |
 
|   8. Major depressive disorder, single episode, unspecified                               
                                                                                            
                    |
|   9. Chronic obstructive pulmonary disease, unspecified                                   
                                                                                            
                    |
|                                                                                           
                                                                                            
                    |
|Mar 6, 2018 [facility] JANEEN Ramsay. [place] Cardiology                              
                                                                                            
                    |
|  Heart Failure                                                                            
                                                                                            
                    |
|   Need for iv access                                                                      
                                                                                            
                    |
|   Type 2 diabetes mellitus with other specified complication                              
                                                                                            
                    |
|   Long term (current) use of insulin                                                      
                                                                                            
                    |
|   Paroxysmal atrial fibrillation                                                          
                                                                                            
                    |
|   Anemia in chronic kidney disease                                                        
                                                                                            
                    |
|   Atherosclerotic heart disease of native coronary artery without angina pectoris         
                                                                                            
                    |
|   Cardiomyopathy, unspecified                                                             
                                                                                            
                    |
|   End stage renal disease                                                                 
                                                                                            
                    |
|   Other specified postprocedural states                                                   
                                                                                            
                    |
|                                                                                           
                                                                                            
                    |
|                                                                                           
                                                                                            
                    |
|                                                                                           
                                                                                            
                    |
|Care Providers                                                                             
                                                                                            
                    |
|Provider PRC Type Phone Fax Service Dates                                                  
                                                                                            
                    |
|HEADINGS, ELZA HENDERSON Nurse Practitioner: Family   Current                                
                                                                                            
                    |
 
|[facility] Martinez /Care Coordinator [phone]  2019 - Current         
                                                                                            
                    |
|[facility] Primary [facility] [phone]  2019 - Current                          
                                                                                            
                    |
|[facility] Primary Care  [phone] Current                         
                                                                                            
                    |
|[facility] Primary Care   Current                                
                                                                                            
                    |
|[facility] GON[facility]ALE[facility] Primary Care   Current                                                        
                                                                                            
                    |
|[employer] Mental Health Provider [phone] Current                 
                                                                                            
                    |
|[facility] Case or Care Manager   Current                                                   
                                                                                            
                    |
|MIRTA Goel Case or Care Manager [phone] Current
                                                                                            
                    |
|Jairo Gutierrez MD Other   Current                                                       
                                                                                            
                    |
|                                                                                           
                                                                                            
                    |
|[facility]                                                                          
                                                                                            
                    |
|This patient has registered at the [facility]     
                                                                                            
                    |
|For more information visit: [email or web address]
[email or web address]                                                                               
                    |
|The above information is provided for the sole purpose of patient treatment. Use of this in
formation beyond the terms of Data Sharing Memorandum of Understanding and License Agreement
 is prohibited. In  |
|certain cases not all visits may be represented. Consult the aforementioned facilities for 
additional information.                                                                     
                    |
|2019 [employer] - [place], [place] - [email or web address]                                                                                       
                    |
+-------------------------------------------------------------------------------------------
--------------------------------------------------------------------------------------------
--------------------+
 
 
 
+-------------------+---------+--------------------+--------------+
| Performing        | Address | City/State/Zipcode | Phone Number |
| Organization      |         |                    |              |
+-------------------+---------+--------------------+--------------+
|   ED INFORMATION  |         |                    |              |
| EXCHANGE          |         |                    |              |
 
+-------------------+---------+--------------------+--------------+
 in this encounter
 
 Visit Diagnoses
 Not on filein this encounter

## 2019-04-19 NOTE — XMS
Encounter Summary
  Created on: 2019
 
 Cherie Villalobos
 External Reference #: [id number]
 : 48
 Sex: Female
 
 Demographics
 
 
+-----------------------+--------------------------+
| Address               | [address]               |
|                       | [place]  [address] |
+-----------------------+--------------------------+
| Home Phone            | [phone]          |
+-----------------------+--------------------------+
| Preferred Language    | Unknown                  |
+-----------------------+--------------------------+
| Marital Status        |                   |
+-----------------------+--------------------------+
| Judaism Affiliation | 1013                     |
+-----------------------+--------------------------+
| Race                  | Unknown                  |
+-----------------------+--------------------------+
| Ethnic Group          | Unknown                  |
+-----------------------+--------------------------+
 
 
 Author
 
 
+--------------+-----------------------+
| Author       | Sherry Animated [employer] |
+--------------+-----------------------+
| Organization | [facility] |
+--------------+-----------------------+
| Address      | Unknown               |
+--------------+-----------------------+
| Phone        | Unavailable           |
+--------------+-----------------------+
 
 
 
 Support
 
 
+---------------+--------------+---------------------+-----------------+
| Name          | Relationship | Address             | Phone           |
+---------------+--------------+---------------------+-----------------+
| Anoop Villalobos | ECON         | [place], | [phone] |
|               |              |  [place]  [address]     |                 |
+---------------+--------------+---------------------+-----------------+
| Jennifer Dickson | ECON         | [place]       | [phone] |
|               |              | [phone]               |                 |
+---------------+--------------+---------------------+-----------------+
 
 
 
 
 Care Team Providers
 
 
+-----------------------+------+-----------------+
| Care Team Member Name | Role | Phone           |
+-----------------------+------+-----------------+
| Ivy Couch DO      | PCP  | [phone] |
+-----------------------+------+-----------------+
 
 
 
 Reason for Visit
 
 
+--------+---------------------------------------------------+
| Reason | Comments                                          |
+--------+---------------------------------------------------+
| [facility]  | [facility] Provider Eyad Tao Note |
+--------+---------------------------------------------------+
 
 
 
 Encounter Details
 
 
+--------+-------------+----------------------+----------------------+----------------------
+
| Date   | Type        | Department           | Care Team            | Description          
|
+--------+-------------+----------------------+----------------------+----------------------
+
| 04/10/ | Documentati |   [facility]      |   João Asher MD  | Other (March         
|
| 2019   | on Only     | [place]  [address]        |  [address]   | 2019-[facility] Provider 
|
|        |             | [address]   | [address]  [place], [place]    |  Dialysis Round[address]   
|
|        |             | [place] [address]   | [address]  [phone]  | Note)                
|
|        |             | [phone]         |  [phone] (Fax)  |                      
|
+--------+-------------+----------------------+----------------------+----------------------
+
 
 
 
 Social History
 
 
+---------------+------------+-----------+--------+------------------+
| Tobacco Use   | Types      | Packs/Day | Years  | Date             |
|               |            |           | Used   |                  |
+---------------+------------+-----------+--------+------------------+
| Former Smoker | Cigarettes | 1         | 30     | Quit: 2004 |
+---------------+------------+-----------+--------+------------------+
 
 
 
+---------------------+---+---+---+
 
| Smokeless Tobacco:  |   |   |   |
| Never Used          |   |   |   |
+---------------------+---+---+---+
 
 
 
+------------------------------+
| Comments: quite smoking  |
+------------------------------+
 
 
 
+-------------+-----------+---------+----------+
| Alcohol Use | Drinks/We | oz/Week | Comments |
|             | ek        |         |          |
+-------------+-----------+---------+----------+
| No          |           |         |          |
+-------------+-----------+---------+----------+
 
 
 
+------------------+---------------+
| Sex Assigned at  | Date Recorded |
| Birth            |               |
+------------------+---------------+
| Not on file      |               |
+------------------+---------------+
 as of this encounter
 
 Plan of Treatment
 
 
+--------+----------+-----------------+----------------------+-------------+
| Date   | Type     | Specialty       | Care Team            | Description |
+--------+----------+-----------------+----------------------+-------------+
| / | Initial  | General Surgery |   Сергей Medeiros, |             |
|    | consult  |                 |  MD [address]  |             |
|        |          |                 |  [place] [address]  |             |
|        |          |                 |  [phone]        |             |
|        |          |                 | [phone] (Fax)   |             |
+--------+----------+-----------------+----------------------+-------------+
 as of this encounter
 
 Visit Diagnoses
 Not on filein this encounter

## 2019-04-19 NOTE — XMS
Encounter Summary
  Created on: 2019
 
 Cherie Villalobos
 External Reference #: [id number]
 : 48
 Sex: Female
 
 Demographics
 
 
+-----------------------+--------------------------+
| Address               | [address]               |
|                       | [place]  [address] |
+-----------------------+--------------------------+
| Home Phone            | [phone]          |
+-----------------------+--------------------------+
| Preferred Language    | Unknown                  |
+-----------------------+--------------------------+
| Marital Status        |                   |
+-----------------------+--------------------------+
| Hindu Affiliation | 1013                     |
+-----------------------+--------------------------+
| Race                  | Unknown                  |
+-----------------------+--------------------------+
| Ethnic Group          | Unknown                  |
+-----------------------+--------------------------+
 
 
 Author
 
 
+--------------+--------------------------------------------+
| Author       | [facility]  |
|              | [facility] [place]                                |
+--------------+--------------------------------------------+
| Organization | [facility]  |
|              | [facility] [place]                                |
+--------------+--------------------------------------------+
| Address      | Unknown                                    |
+--------------+--------------------------------------------+
| Phone        | Unavailable                                |
+--------------+--------------------------------------------+
 
 
 
 Support
 
 
+---------------+--------------+---------------------+-----------------+
| Name          | Relationship | Address             | Phone           |
+---------------+--------------+---------------------+-----------------+
| Anoop Villalobos | ECON         | [address], | [phone] |
|               |              |  [place]  [address]     |                 |
+---------------+--------------+---------------------+-----------------+
| Jennifer Dickson | ECON         | [place], [place]       | [phone] |
|               |              | [phone]               |                 |
+---------------+--------------+---------------------+-----------------+
 
 
 
 
 Care Team Providers
 
 
+--------------------------+------+-----------------+
| Care Team Member Name    | Role | Phone           |
+--------------------------+------+-----------------+
| Araseli Montelongo Activity  | PCP  | [phone] |
| Professional             |      |                 |
+--------------------------+------+-----------------+
 
 
 
 Reason for Visit
 
 
+--------+------------------------------------+
| Reason | Comments                           |
+--------+------------------------------------+
| Other  | medication changed due to ER visit |
+--------+------------------------------------+
 
 
 
 Encounter Details
 
 
+--------+-----------+----------------------+----------------------+--------------------+
| Date   | Type      | Department           | Care Team            | Description        |
+--------+-----------+----------------------+----------------------+--------------------+
| / | Telephone |   [facility]          |   Johnie John, | Other (medication  |
| 2019   |           | CARDIOLOGY  [address]    |  MD  [address] | changed due to ER  |
|        |           | [address]  [place], |  [address]  [place],   | visit)             |
|        |           |  [place] [address]       | [place] [address]             |                    |
|        |           | [phone]         | [phone]         |                    |
|        |           |                      | [phone] (Fax)   |                    |
+--------+-----------+----------------------+----------------------+--------------------+
 
 
 
 Social History
 
 
+---------------+------------+-----------+--------+------------------+
| Tobacco Use   | Types      | Packs/Day | Years  | Date             |
|               |            |           | Used   |                  |
+---------------+------------+-----------+--------+------------------+
| Former Smoker | Cigarettes | 1         | 30     | Quit: 2004 |
+---------------+------------+-----------+--------+------------------+
 
 
 
+---------------------+---+---+---+
| Smokeless Tobacco:  |   |   |   |
| Never Used          |   |   |   |
+---------------------+---+---+---+
 
 
 
 
+-------------+-----------+---------+----------+
| Alcohol Use | Drinks/We | oz/Week | Comments |
|             | ek        |         |          |
+-------------+-----------+---------+----------+
| No          |           |         |          |
+-------------+-----------+---------+----------+
 
 
 
+------------------+---------------+
| Sex Assigned at  | Date Recorded |
| Birth            |               |
+------------------+---------------+
| Not on file      |               |
+------------------+---------------+
 
 
 
+----------------+-------------+-------------+
| Job Start Date | Occupation  | Industry    |
+----------------+-------------+-------------+
| Not on file    | Not on file | Not on file |
+----------------+-------------+-------------+
 
 
 
+----------------+--------------+------------+
| Travel History | Travel Start | Travel End |
+----------------+--------------+------------+
 
 
 
+-------------------------------------+
| No recent travel history available. |
+-------------------------------------+
 documented as of this encounter
 
 Functional Status
 
 
+---------------------------------------------+----------+--------------------+
| Functional Status                           | Response | Date of Assessment |
+---------------------------------------------+----------+--------------------+
| Are you deaf or do you have serious         | No       | 2018         |
| difficulty hearing?                         |          |                    |
+---------------------------------------------+----------+--------------------+
| Are you blind or do you have serious        | No       | 2018         |
| difficulty seeing, even when wearing        |          |                    |
| glasses?                                    |          |                    |
+---------------------------------------------+----------+--------------------+
| Do you have serious difficulty walking or   | No       | 2018         |
| climbing stairs? (5 years old or older)     |          |                    |
+---------------------------------------------+----------+--------------------+
| Do you have difficulty dressing or bathing? | No       | 2018         |
|  (5 years old or older)                     |          |                    |
+---------------------------------------------+----------+--------------------+
| Because of a physical, mental, or emotional | No       | 2018         |
|  condition, do you have difficulty doing    |          |                    |
| errands alone such as visiting a doctor's   |          |                    |
 
| office or shopping? [15 years old or        |          |                    |
| older)]                                     |          |                    |
+---------------------------------------------+----------+--------------------+
 
 
 
+---------------------------------------------+----------+--------------------+
| Cognitive Status                            | Response | Date of Assessment |
+---------------------------------------------+----------+--------------------+
| Because of a physical, mental, or emotional | No       | 2018         |
|  condition, do you have serious difficulty  |          |                    |
| concentrating, remembering, or making       |          |                    |
| decisions? (5 years old or older)           |          |                    |
+---------------------------------------------+----------+--------------------+
 documented as of this encounter
 
 Plan of Treatment
 
 
+--------+---------+------------+----------------------+-------------+
| Date   | Type    | Specialty  | Care Team            | Description |
+--------+---------+------------+----------------------+-------------+
| / | Office  | Cardiology |   Johnie John, |             |
|    | Visit   |            |  MD  [address] |             |
|        |         |            |  [place],   |             |
|        |         |            | [place] [address]             |             |
|        |         |            | [phone]         |             |
|        |         |            | [phone] (Fax)   |             |
+--------+---------+------------+----------------------+-------------+
 documented as of this encounter
 
 Visit Diagnoses
 Not on filedocumented in this encounter

## 2019-04-19 NOTE — XMS
Encounter Summary
  Created on: 2019
 
 Cherie Villalobos
 External Reference #: [id number]
 : 48
 Sex: Female
 
 Demographics
 
 
+-----------------------+--------------------------+
| Address               | [address]               |
|                       | [place]  [address] |
+-----------------------+--------------------------+
| Home Phone            | [phone]          |
+-----------------------+--------------------------+
| Preferred Language    | Unknown                  |
+-----------------------+--------------------------+
| Marital Status        |                   |
+-----------------------+--------------------------+
| Islam Affiliation | 1013                     |
+-----------------------+--------------------------+
| Race                  | Unknown                  |
+-----------------------+--------------------------+
| Ethnic Group          | Unknown                  |
+-----------------------+--------------------------+
 
 
 Author
 
 
+--------------+-----------------------+
| Author       | Sherry Novafora |
+--------------+-----------------------+
| Organization | [facility] |
+--------------+-----------------------+
| Address      | Unknown               |
+--------------+-----------------------+
| Phone        | Unavailable           |
+--------------+-----------------------+
 
 
 
 Support
 
 
+---------------+--------------+---------------------+-----------------+
| Name          | Relationship | Address             | Phone           |
+---------------+--------------+---------------------+-----------------+
| Anoop Villalobos | ECON         | [place], | [phone] |
|               |              |  [place]  [address]     |                 |
+---------------+--------------+---------------------+-----------------+
| Jennifer Dickson | ECON         | [place]       | [phone] |
|               |              | [address]               |                 |
+---------------+--------------+---------------------+-----------------+
 
 
 
 
 Care Team Providers
 
 
+-----------------------+------+-----------------+
| Care Team Member Name | Role | Phone           |
+-----------------------+------+-----------------+
| Ivy Couch DO      | PCP  | [phone] |
+-----------------------+------+-----------------+
 
 
 
 Reason for Visit
 
 
+--------+------------------------------------------------------+
| Reason | Comments                                             |
+--------+------------------------------------------------------+
| Other  | [facility] Note |
+--------+------------------------------------------------------+
 
 
 
 Encounter Details
 
 
+--------+-------------+----------------------+----------------------+----------------------
+
| Date   | Type        | Department           | Care Team            | Description          
|
+--------+-------------+----------------------+----------------------+----------------------
+
| / | Documentati |   [facility]      |   João Asher MD  | Other (February      
|
|    | on Only     | [place]  [address]        |  [address]   | 2019-[facility] Provider 
|
|        |             | [address]   | [address]  [place], [place]    |  Dialysis Rounding   
|
|        |             | [place] [address]   | [address] [phone]  | Note)                
|
|        |             | [phone]         |  [phone] (Fax)  |                      
|
+--------+-------------+----------------------+----------------------+----------------------
+
 
 
 
 Social History
 
 
+---------------+------------+-----------+--------+------------------+
| Tobacco Use   | Types      | Packs/Day | Years  | Date             |
|               |            |           | Used   |                  |
+---------------+------------+-----------+--------+------------------+
| Former Smoker | Cigarettes | 1         | 30     | Quit: 2004 |
+---------------+------------+-----------+--------+------------------+
 
 
 
+---------------------+---+---+---+
 
| Smokeless Tobacco:  |   |   |   |
| Never Used          |   |   |   |
+---------------------+---+---+---+
 
 
 
+------------------------------+
| Comments: quite smoking  |
+------------------------------+
 
 
 
+-------------+-----------+---------+----------+
| Alcohol Use | Drinks/We | oz/Week | Comments |
|             | ek        |         |          |
+-------------+-----------+---------+----------+
| No          |           |         |          |
+-------------+-----------+---------+----------+
 
 
 
+------------------+---------------+
| Sex Assigned at  | Date Recorded |
| Birth            |               |
+------------------+---------------+
| Not on file      |               |
+------------------+---------------+
 as of this encounter
 
 Plan of Treatment
 
 
+--------+----------+-----------------+----------------------+-------------+
| Date   | Type     | Specialty       | Care Team            | Description |
+--------+----------+-----------------+----------------------+-------------+
| / | Initial  | General Surgery |   Сергей Medeiros, |             |
|    | consult  |                 |  [address]  |             |
|        |          |                 |  [place] [address]  |             |
|        |          |                 |  [phone]        |             |
|        |          |                 | [phone] (Fax)   |             |
+--------+----------+-----------------+----------------------+-------------+
 as of this encounter
 
 Visit Diagnoses
 Not on filein this encounter

## 2019-04-19 NOTE — XMS
Encounter Summary
  Created on: 2019
 
 Cherie Villalobos
 External Reference #: [id number]
 : 48
 Sex: Female
 
 Demographics
 
 
+-----------------------+--------------------------+
| Address               | [address]               |
|                       | [place]  [address] |
+-----------------------+--------------------------+
| Home Phone            | [phone]          |
+-----------------------+--------------------------+
| Preferred Language    | Unknown                  |
+-----------------------+--------------------------+
| Marital Status        |                   |
+-----------------------+--------------------------+
| Rastafarian Affiliation | 1013                     |
+-----------------------+--------------------------+
| Race                  | Unknown                  |
+-----------------------+--------------------------+
| Ethnic Group          | Unknown                  |
+-----------------------+--------------------------+
 
 
 Author
 
 
+--------------+-----------------------+
| Author       | Sherry Cliq |
+--------------+-----------------------+
| Organization | [facility] |
+--------------+-----------------------+
| Address      | Unknown               |
+--------------+-----------------------+
| Phone        | Unavailable           |
+--------------+-----------------------+
 
 
 
 Support
 
 
+---------------+--------------+---------------------+-----------------+
| Name          | Relationship | Address             | Phone           |
+---------------+--------------+---------------------+-----------------+
| Anoop Villalobos | ECON         | [place], | [phone] |
|               |              |  [place]  [address]     |                 |
+---------------+--------------+---------------------+-----------------+
| Jennifer Dickson | ECON         | [place]       | [phone] |
|               |              | [address]               |                 |
+---------------+--------------+---------------------+-----------------+
 
 
 
 
 Care Team Providers
 
 
+-----------------------+------+-----------------+
| Care Team Member Name | Role | Phone           |
+-----------------------+------+-----------------+
| Ivy Couch DO      | PCP  | [phone] |
+-----------------------+------+-----------------+
 
 
 
 Encounter Details
 
 
+--------+------------+----------------------+-----------+-------------+
| Date   | Type       | Department           | Care Team | Description |
+--------+------------+----------------------+-----------+-------------+
| / | Procedure  |   [facility]    |           |             |
| 2019   | Pass       | [facility] [address]   |           |             |
|        |            | Floor [address] |           |             |
|        |            |   [address]     |           |             |
|        |            | [place] [address]   |           |             |
|        |            | [phone]         |           |             |
+--------+------------+----------------------+-----------+-------------+
 
 
 
 Social History
 
 
+---------------+------------+-----------+--------+------------------+
| Tobacco Use   | Types      | Packs/Day | Years  | Date             |
|               |            |           | Used   |                  |
+---------------+------------+-----------+--------+------------------+
| Former Smoker | Cigarettes | 1         | 30     | Quit: 2004 |
+---------------+------------+-----------+--------+------------------+
 
 
 
+---------------------+---+---+---+
| Smokeless Tobacco:  |   |   |   |
| Never Used          |   |   |   |
+---------------------+---+---+---+
 
 
 
+------------------------------+
| Comments: quite smoking  |
+------------------------------+
 
 
 
+-------------+-----------+---------+----------+
| Alcohol Use | Drinks/We | oz/Week | Comments |
|             | ek        |         |          |
+-------------+-----------+---------+----------+
| No          |           |         |          |
+-------------+-----------+---------+----------+
 
 
 
 
+------------------+---------------+
| Sex Assigned at  | Date Recorded |
| Birth            |               |
+------------------+---------------+
| Not on file      |               |
+------------------+---------------+
 as of this encounter
 
 Plan of Treatment
 
 
+--------+----------+-----------------+----------------------+-------------+
| Date   | Type     | Specialty       | Care Team            | Description |
+--------+----------+-----------------+----------------------+-------------+
| / | Initial  | General Surgery |   Сергей Medeiros, |             |
| 2019   | consult  |                 |  MD [address]  |             |
|        |          |                 |  [place] [address]  |             |
|        |          |                 |  [phone]        |             |
|        |          |                 | [phone] (Fax)   |             |
+--------+----------+-----------------+----------------------+-------------+
 as of this encounter
 
 Visit Diagnoses
 Not on filein this encounter

## 2019-04-19 NOTE — XMS
Encounter Summary
  Created on: 2019
 
 Cherie Villalobos
 External Reference #: [id number]
 : 48
 Sex: Female
 
 Demographics
 
 
+-----------------------+--------------------------+
| Address               | [address]               |
|                       | [place]  [address] |
+-----------------------+--------------------------+
| Home Phone            | [phone]          |
+-----------------------+--------------------------+
| Preferred Language    | Unknown                  |
+-----------------------+--------------------------+
| Marital Status        |                   |
+-----------------------+--------------------------+
| Anabaptist Affiliation | [id number]                     |
+-----------------------+--------------------------+
| Race                  | Unknown                  |
+-----------------------+--------------------------+
| Ethnic Group          | Unknown                  |
+-----------------------+--------------------------+
 
 
 Author
 
 
+--------------+--------------------------------------------+
| Author       | [facility]  |
|              | [facility] [place]                                |
+--------------+--------------------------------------------+
| Organization | [facility]  |
|              | [facility] [place]                                |
+--------------+--------------------------------------------+
| Address      | Unknown                                    |
+--------------+--------------------------------------------+
| Phone        | Unavailable                                |
+--------------+--------------------------------------------+
 
 
 
 Support
 
 
+---------------+--------------+---------------------+-----------------+
| Name          | Relationship | Address             | Phone           |
+---------------+--------------+---------------------+-----------------+
| Anoop Villalobos | ECON         | [address], | [phone] |
|               |              |  [place]  [address]     |                 |
+---------------+--------------+---------------------+-----------------+
| Jennifer Dickson | ECON         | [place]       | [phone] |
|               |              | [phone]               |                 |
+---------------+--------------+---------------------+-----------------+
 
 
 
 
 Care Team Providers
 
 
+-----------------------+------+-------------+
| Care Team Member Name | Role | Phone       |
+-----------------------+------+-------------+
 PCP  | Unavailable |
+-----------------------+------+-------------+
 
 
 
 Reason for Visit
 
 
+---------------+----------+
| Reason        | Comments |
+---------------+----------+
| Device Check  | Billed   |
| (Remote)      |          |
+---------------+----------+
 
 
 
 Encounter Details
 
 
+--------+----------+----------------------+----------------------+----------------------+
| Date   | Type     | Department           | Care Team            | Description          |
+--------+----------+----------------------+----------------------+----------------------+
| / | Implant  |   [facility]          |   Johnie John, | Remote Device        |
| 2019   | Monitor  | CARDIOLOGY  [address]    |  MD  [address] | Interrogation        |
|        |          | [address]  [place], |  [address]  [place],   | (Primary Dx); ICD    |
|        |          |  [place] [address]       | [place] [address]             | (implantable         |
|        |          | [phone]         | [phone]         | cardioverter-defibri |
|        |          |                      | [phone] (Fax)   | llator) [employer]       |
|        |          |                      |                      | Scientific  18   |
|        |          |                      |                      | Dora;           |
|        |          |                      |                      | Cardiomyopathy,      |
|        |          |                      |                      | unspecified type     |
|        |          |                      |                      | (HCC)                |
+--------+----------+----------------------+----------------------+----------------------+
 
 
 
 Social History
 
 
+---------------+------------+-----------+--------+------------------+
| Tobacco Use   | Types      | Packs/Day | Years  | Date             |
|               |            |           | Used   |                  |
+---------------+------------+-----------+--------+------------------+
| Former Smoker | Cigarettes | 1         | 30     | Quit: 2004 |
+---------------+------------+-----------+--------+------------------+
 
 
 
+---------------------+---+---+---+
 
| Smokeless Tobacco:  |   |   |   |
| Never Used          |   |   |   |
+---------------------+---+---+---+
 
 
 
+-------------+-----------+---------+----------+
| Alcohol Use | Drinks/We | oz/Week | Comments |
|             | ek        |         |          |
+-------------+-----------+---------+----------+
| No          |           |         |          |
+-------------+-----------+---------+----------+
 
 
 
+------------------+---------------+
| Sex Assigned at  | Date Recorded |
| Birth            |               |
+------------------+---------------+
| Not on file      |               |
+------------------+---------------+
 
 
 
+----------------+-------------+-------------+
| Job Start Date | Occupation  | Industry    |
+----------------+-------------+-------------+
| Not on file    | Not on file | Not on file |
+----------------+-------------+-------------+
 
 
 
+----------------+--------------+------------+
| Travel History | Travel Start | Travel End |
+----------------+--------------+------------+
 
 
 
+-------------------------------------+
| No recent travel history available. |
+-------------------------------------+
 documented as of this encounter
 
 Functional Status
 
 
+---------------------------------------------+----------+--------------------+
| Functional Status                           | Response | Date of Assessment |
+---------------------------------------------+----------+--------------------+
| Are you deaf or do you have serious         | No       | 2018         |
| difficulty hearing?                         |          |                    |
+---------------------------------------------+----------+--------------------+
| Are you blind or do you have serious        | No       | 2018         |
| difficulty seeing, even when wearing        |          |                    |
| glasses?                                    |          |                    |
+---------------------------------------------+----------+--------------------+
| Do you have serious difficulty walking or   | No       | 2018         |
| climbing stairs? (5 years old or older)     |          |                    |
+---------------------------------------------+----------+--------------------+
| Do you have difficulty dressing or bathing? | No       | 2018         |
 
|  (5 years old or older)                     |          |                    |
+---------------------------------------------+----------+--------------------+
| Because of a physical, mental, or emotional | No       | 2018         |
|  condition, do you have difficulty doing    |          |                    |
| errands alone such as visiting a doctor's   |          |                    |
| office or shopping? [15 years old or        |          |                    |
| older)]                                     |          |                    |
+---------------------------------------------+----------+--------------------+
 
 
 
+---------------------------------------------+----------+--------------------+
| Cognitive Status                            | Response | Date of Assessment |
+---------------------------------------------+----------+--------------------+
| Because of a physical, mental, or emotional | No       | 2018         |
|  condition, do you have serious difficulty  |          |                    |
| concentrating, remembering, or making       |          |                    |
| decisions? (5 years old or older)           |          |                    |
+---------------------------------------------+----------+--------------------+
 documented as of this encounter
 
 Plan of Treatment
 
 
+--------+---------+------------+----------------------+-------------+
| Date   | Type    | Specialty  | Care Team            | Description |
+--------+---------+------------+----------------------+-------------+
| / | Office  | Cardiology |   Johnie John, |             |
|    | Visit   |            |  MD  [address] |             |
|        |         |            |  [place],   |             |
|        |         |            | [place] [address]             |             |
|        |         |            | [phone]         |             |
|        |         |            | [phone] (Fax)   |             |
+--------+---------+------------+----------------------+-------------+
 documented as of this encounter
 
 Procedures
 
 
+-----------------+--------+-------------+----------------------+----------------------+
| Procedure Name  | Priori | Date/Time   | Associated Diagnosis | Comments             |
|                 | ty     |             |                      |                      |
+-----------------+--------+-------------+----------------------+----------------------+
| DEVICE          | Routin | 2019  |   Remote Device      |   Results for this   |
| INTERROGATION-  | e      | 23:59 PDT   | Interrogation   ICD  | procedure are in the |
| REMOTE          |        |             | (implantable         |  results section.    |
|                 |        |             | cardioverter-defibri |                      |
|                 |        |             | llator) [employer]       |                      |
|                 |        |             | Scientific  18   |                      |
|                 |        |             | Dora            |                      |
|                 |        |             | Cardiomyopathy,      |                      |
|                 |        |             | unspecified type     |                      |
|                 |        |             | ([facility])                |                      |
+-----------------+--------+-------------+----------------------+----------------------+
 documented in this encounter
 
 Results
 Device Interrogation - Remote (2019 23:59 PDT)
 
+-----------------------------------------------------------------------+--------------+
 
| Narrative                                                             | Performed At |
+-----------------------------------------------------------------------+--------------+
|   This result has an attachment that is not available.  Johnie        |   [facility]    |
| MD [place]         2019 15:05Date of Remote Interrogation:    |              |
| 19 Refer to [employer] documentation and remote PDF scanned into    |              |
| EPIC for remote interrogation results.    Data collected by Clementina SALAZAR     |              |
| NAHUN Maynard Presenting rhythm:    sinus rhythm 59-60 beats.0           |              |
| ventricular high rate episodes. No tachycardia therapies recommended  |              |
| or delivered.Histogram    good.     Battery longevity                 |              |
| 12    years.Apparent normal and stable device function.Device         |              |
| interrogation due in office in 2019.                        |              |
|recommended or delivered.                                              |              |
|Histogram    good.     Battery longevity 12    years.                 |              |
|Apparent normal and stable device function.                            |              |
|Device interrogation due in office in 2019.                  |              |
|                                                                       |              |
+-----------------------------------------------------------------------+--------------+
 
 
 
+--------------+---------+--------------------+--------------+
| Performing   | Address | City/State/Zipcode | Phone Number |
| Organization |         |                    |              |
+--------------+---------+--------------------+--------------+
|   [facility]    |         |                    |              |
+--------------+---------+--------------------+--------------+
 documented in this encounter
 
 Visit Diagnoses
 
 
+------------------------------------------------------------------------------------+
| Diagnosis                                                                          |
+------------------------------------------------------------------------------------+
|   Remote Device Interrogation  - Primary  Fitting and adjustment of automatic      |
| implantable cardiac defibrillator                                                  |
+------------------------------------------------------------------------------------+
|   ICD (implantable cardioverter-defibrillator) [employer]  18 Dora |
+------------------------------------------------------------------------------------+
|   Cardiomyopathy, unspecified type (HCC)                                           |
+------------------------------------------------------------------------------------+
 documented in this encounter

## 2019-04-19 NOTE — XMS
Encounter Summary
  Created on: 2019
 
 Cherie Villalobos
 External Reference #: [id number]
 : 48
 Sex: Female
 
 Demographics
 
 
+-----------------------+--------------------------+
| Address               | [address]               |
|                       | [place]  [address] |
+-----------------------+--------------------------+
| Home Phone            | [phone]          |
+-----------------------+--------------------------+
| Preferred Language    | Unknown                  |
+-----------------------+--------------------------+
| Marital Status        |                   |
+-----------------------+--------------------------+
| Orthodoxy Affiliation | 1013                     |
+-----------------------+--------------------------+
| Race                  | Unknown                  |
+-----------------------+--------------------------+
| Ethnic Group          | Unknown                  |
+-----------------------+--------------------------+
 
 
 Author
 
 
+--------------+-----------------------+
| Author       | Sherry Ocelus |
+--------------+-----------------------+
| Organization | [facility] |
+--------------+-----------------------+
| Address      | Unknown               |
+--------------+-----------------------+
| Phone        | Unavailable           |
+--------------+-----------------------+
 
 
 
 Support
 
 
+---------------+--------------+---------------------+-----------------+
| Name          | Relationship | Address             | Phone           |
+---------------+--------------+---------------------+-----------------+
| Anoop Villalobos | ECON         | [place], | [phone] |
|               |              |  [place]  [address]     |                 |
+---------------+--------------+---------------------+-----------------+
| Jennifer Dickson | ECON         | [place]       | [phone] |
|               |              | [phone]               |                 |
+---------------+--------------+---------------------+-----------------+
 
 
 
 
 Care Team Providers
 
 
+-----------------------+------+-----------------+
| Care Team Member Name | Role | Phone           |
+-----------------------+------+-----------------+
| Ivy Couch DO      | PCP  | [phone] |
+-----------------------+------+-----------------+
 
 
 
 Encounter Details
 
 
+--------+-------------+----------------------+---------------------+-------------+
| Date   | Type        | Department           | Care Team           | Description |
+--------+-------------+----------------------+---------------------+-------------+
| / | Orders Only |   [facility]      |   Dionne Danielson MA |             |
| 2019   |             | Vascular Surgery     |                     |             |
|        |             | [address] |                     |             |
|        |             |  E  [place]     |                     |             |
|        |             | [address]           |                     |             |
|        |             | [phone]         |                     |             |
+--------+-------------+----------------------+---------------------+-------------+
 
 
 
 Social History
 
 
+---------------+------------+-----------+--------+------------------+
| Tobacco Use   | Types      | Packs/Day | Years  | Date             |
|               |            |           | Used   |                  |
+---------------+------------+-----------+--------+------------------+
| Former Smoker | Cigarettes | 1         | 30     | Quit: 2004 |
+---------------+------------+-----------+--------+------------------+
 
 
 
+---------------------+---+---+---+
| Smokeless Tobacco:  |   |   |   |
| Never Used          |   |   |   |
+---------------------+---+---+---+
 
 
 
+------------------------------+
| Comments: quite smoking 2004 |
+------------------------------+
 
 
 
+-------------+-----------+---------+----------+
| Alcohol Use | Drinks/We | oz/Week | Comments |
|             | ek        |         |          |
+-------------+-----------+---------+----------+
| No          |           |         |          |
+-------------+-----------+---------+----------+
 
 
 
 
+------------------+---------------+
| Sex Assigned at  | Date Recorded |
| Birth            |               |
+------------------+---------------+
| Not on file      |               |
+------------------+---------------+
 as of this encounter
 
 Plan of Treatment
 
 
+--------+----------+-----------------+----------------------+-------------+
| Date   | Type     | Specialty       | Care Team            | Description |
+--------+----------+-----------------+----------------------+-------------+
| / | Initial  | General Surgery |   Сергей Medeiros, |             |
| 2019   | consult  |                 |  MD [address]  |             |
|        |          |                 |  [place] [address]  |             |
|        |          |                 |  [phone]        |             |
|        |          |                 | [phone] (Fax)   |             |
+--------+----------+-----------------+----------------------+-------------+
 as of this encounter
 
 Visit Diagnoses
 Not on filein this encounter

## 2019-04-19 NOTE — XMS
Encounter Summary
  Created on: 2019
 
 Cherie Villalobos
 External Reference #: [id number]
 : 48
 Sex: Female
 
 Demographics
 
 
+-----------------------+--------------------------+
| Address               | [address]               |
|                       | [place]  [address] |
+-----------------------+--------------------------+
| Home Phone            | [phone]          |
+-----------------------+--------------------------+
| Preferred Language    | Unknown                  |
+-----------------------+--------------------------+
| Marital Status        |                   |
+-----------------------+--------------------------+
| Jew Affiliation | [id number]                     |
+-----------------------+--------------------------+
| Race                  | Unknown                  |
+-----------------------+--------------------------+
| Ethnic Group          | Unknown                  |
+-----------------------+--------------------------+
 
 
 Author
 
 
+--------------+-----------------------+
| Author       | Sherry Nomanini |
+--------------+-----------------------+
| Organization | [facility] |
+--------------+-----------------------+
| Address      | Unknown               |
+--------------+-----------------------+
| Phone        | Unavailable           |
+--------------+-----------------------+
 
 
 
 Support
 
 
+---------------+--------------+---------------------+-----------------+
| Name          | Relationship | Address             | Phone           |
+---------------+--------------+---------------------+-----------------+
| Anoop Villalobos | ECON         | [place], | [phone] |
|               |              |  [place]  [address]     |                 |
+---------------+--------------+---------------------+-----------------+
| Jennifer Dickson | ECON         | [place]       | [phone] |
|               |              | [id number]               |                 |
+---------------+--------------+---------------------+-----------------+
 
 
 
 
 Care Team Providers
 
 
+-----------------------+------+-----------------+
| Care Team Member Name | Role | Phone           |
+-----------------------+------+-----------------+
| Ivy Couch DO      | PCP  | [phone] |
+-----------------------+------+-----------------+
 
 
 
 Reason for Visit
 
 
+-----------+-----------------------------------------+
| Reason    | Comments                                |
+-----------+-----------------------------------------+
| Paperwork | [facility] |
+-----------+-----------------------------------------+
 
 
 
 Encounter Details
 
 
+--------+-------------+----------------------+---------------------+----------------------+
| Date   | Type        | Department           | Care Team           | Description          |
+--------+-------------+----------------------+---------------------+----------------------+
| / | Documentati |   [facility] |   Luisa Segovia  | Paperwork ([facility]   |
| 2019   | [facility]     |  [facility]  [address]      | [address]               | [facility]  |
|        |             | [address]   |                     | & Orthotics)         |
|        |             | [place], [place]         |                     |                      |
|        |             | [address]           |                     |                      |
|        |             | [phone]         |                     |                      |
+--------+-------------+----------------------+---------------------+----------------------+
 
 
 
 Social History
 
 
+---------------+------------+-----------+--------+------------------+
| Tobacco Use   | Types      | Packs/Day | Years  | Date             |
|               |            |           | Used   |                  |
+---------------+------------+-----------+--------+------------------+
| Former Smoker | Cigarettes | 1         | 30     | Quit: 2004 |
+---------------+------------+-----------+--------+------------------+
 
 
 
+---------------------+---+---+---+
| Smokeless Tobacco:  |   |   |   |
| Never Used          |   |   |   |
+---------------------+---+---+---+
 
 
 
+------------------------------+
| Comments: quite smoking  |
 
+------------------------------+
 
 
 
+-------------+-----------+---------+----------+
| Alcohol Use | Drinks/We | oz/Week | Comments |
|             | ek        |         |          |
+-------------+-----------+---------+----------+
| No          |           |         |          |
+-------------+-----------+---------+----------+
 
 
 
+------------------+---------------+
| Sex Assigned at  | Date Recorded |
| Birth            |               |
+------------------+---------------+
| Not on file      |               |
+------------------+---------------+
 as of this encounter
 
 Plan of Treatment
 
 
+--------+----------+-----------------+----------------------+-------------+
| Date   | Type     | Specialty       | Care Team            | Description |
+--------+----------+-----------------+----------------------+-------------+
| / | Initial  | General Surgery |   Сергей Medeiros, |             |
|    | consult  |                 |  MD [address]  |             |
|        |          |                 |  [place] [address]  |             |
|        |          |                 |  [phone]        |             |
|        |          |                 | [phone] (Fax)   |             |
+--------+----------+-----------------+----------------------+-------------+
 as of this encounter
 
 Visit Diagnoses
 Not on filein this encounter

## 2019-04-19 NOTE — XMS
Clinical Summary
  Created on: 2019
 
 Cherie Villalobos
 External Reference #: [id number]
 : 48
 Sex: Female
 
 Demographics
 
 
+-----------------------+--------------------------+
| Address               | [address]               |
|                       | [place]  [address] |
+-----------------------+--------------------------+
| Home Phone            | [phone]          |
+-----------------------+--------------------------+
| Preferred Language    | Unknown                  |
+-----------------------+--------------------------+
| Marital Status        |                   |
+-----------------------+--------------------------+
| Hoahaoism Affiliation | [id number]                     |
+-----------------------+--------------------------+
| Race                  | Unknown                  |
+-----------------------+--------------------------+
| Ethnic Group          | Unknown                  |
+-----------------------+--------------------------+
 
 
 Author
 
 
+--------------+--------------------------------------------+
| Author       | [facility]  |
|              | [facility] [place]                                |
+--------------+--------------------------------------------+
| Organization | [facility]  |
|              | [facility] [place]                                |
+--------------+--------------------------------------------+
| Address      | Unknown                                    |
+--------------+--------------------------------------------+
| Phone        | Unavailable                                |
+--------------+--------------------------------------------+
 
 
 
 Support
 
 
+---------------+--------------+---------------------+-----------------+
| Name          | Relationship | Address             | Phone           |
+---------------+--------------+---------------------+-----------------+
| Anoop Villalobos | ECON         | [address], | [phone] |
|               |              |  [place]  [address]     |                 |
+---------------+--------------+---------------------+-----------------+
| Jennifer Dickson | ECON         | [place], [place]       | [phone] |
|               |              | [address]               |                 |
+---------------+--------------+---------------------+-----------------+
 
 
 
 
 Care Team Providers
 
 
+--------------------------+------+-----------------+
| Care Team Member Name    | Role | Phone           |
+--------------------------+------+-----------------+
| Araseli Montelongo [employer]  | PP   | [phone] |
| Professional             |      |                 |
+--------------------------+------+-----------------+
 
 
 
 Allergies
 
 
+---------------------+----------------------+----------+----------+----------------------+
| Active Allergy      | Reactions            | Severity | Noted    | Comments             |
|                     |                      |          | Date     |                      |
+---------------------+----------------------+----------+----------+----------------------+
| Digoxin And Related | Other (See Comments) | High     | 20 |   Toxic, patient     |
|                     |                      |          | 18       | states her eyes were |
|                     |                      |          |          |  also affected "she  |
|                     |                      |          |          | seen yellow          |
|                     |                      |          |          | everywhere"          |
+---------------------+----------------------+----------+----------+----------------------+
| Morphine            | Other (See Comments) | High     | 20 |   Hallucinations     |
|                     |                      |          | 18       |                      |
+---------------------+----------------------+----------+----------+----------------------+
| Penicillins         | Hives                | Medium   | 20 |                      |
|                     |                      |          | 18       |                      |
+---------------------+----------------------+----------+----------+----------------------+
| Pantoprazole Sodium | Nausea And Vomiting  | Medium   | 20 |                      |
|                     |                      |          | 18       |                      |
+---------------------+----------------------+----------+----------+----------------------+
| Pseudoephedrine     | Anxiety              | High     | 20 |                      |
|                     |                      |          | 18       |                      |
+---------------------+----------------------+----------+----------+----------------------+
| Quinapril Hcl       | Anaphylaxis          | High     | 20 |                      |
|                     |                      |          | 18       |                      |
+---------------------+----------------------+----------+----------+----------------------+
 
 
 
 Medications
 
 
+----------------------+----------------------+-----------+---------+------+------+-------+
| Medication           | Sig                  | Dispensed | Refills | Star | End  | Statu |
|                      |                      |           |         | t    | Date | s     |
|                      |                      |           |         | Date |      |       |
+----------------------+----------------------+-----------+---------+------+------+-------+
|   cholecalciferol    | Take 1 capsule by    |           | 0       |      |      | Activ |
| (VITAMIN D-3) 5000   | mouth Every other    |           |         |      |      | e     |
| units TABS           | day.                 |           |         |      |      |       |
+----------------------+----------------------+-----------+---------+------+------+-------+
|   nortriptyline      | Take 25 mg by mouth  |           | 0       |      |      | Activ |
| (PAMELOR) 25 mg      | 2 times daily. 1     |           |         |      |      | e     |
 
| capsule              | tablet in AM and 3   |           |         |      |      |       |
|                      | tablets in Evening   |           |         |      |      |       |
+----------------------+----------------------+-----------+---------+------+------+-------+
|   BD PEN NEEDLE LISA | Inject 4 Sticks      |           | 0       | 01/0 |      | Activ |
|  U/F 32G X 4 MM MISC | under the skin 4     |           |         | 9/20 |      | e     |
|                      | times daily as       |           |         | 18   |      |       |
|                      | needed.              |           |         |      |      |       |
+----------------------+----------------------+-----------+---------+------+------+-------+
|   oxybutynin         | Take 2.5 mg by mouth |           | 0       |      |      | Activ |
| (DITROPAN) 5 mg      |  2 times daily.      |           |         |      |      | e     |
| tablet               |                      |           |         |      |      |       |
+----------------------+----------------------+-----------+---------+------+------+-------+
|   prochlorperazine   | Take 5 mg by mouth   |           | 0       |      |      | Activ |
| (COMPAZINE) 5 mg     | Twice  daily as      |           |         |      |      | e     |
| tablet               | needed.              |           |         |      |      |       |
+----------------------+----------------------+-----------+---------+------+------+-------+
|   rOPINIRole         | Take 0.75 mg by      |           | 0       |      |      | Activ |
| (REQUIP) 0.25 mg     | mouth nightly. Takes |           |         |      |      | e     |
| tablet               |  3 tablets at night  |           |         |      |      |       |
+----------------------+----------------------+-----------+---------+------+------+-------+
|   insulin aspart     | Inject 1-6 Units     |   10 mL   | 0       | 02/2 |      | Activ |
| (NOVOLOG FLEXPEN)    | under the skin 3     |           |         | 2/20 |      | e     |
| 100 units/mL         | times daily (with    |           |         | 18   |      |       |
| injection            | meals). CORRECTION   |           |         |      |      |       |
| penIndications: Type | SCALE: Novolog 0-6   |           |         |      |      |       |
|  2 diabetes mellitus | units before meals,  |           |         |      |      |       |
|  with other          | bedtime SC.  Add to  |           |         |      |      |       |
| specified            | nutritional dose if  |           |         |      |      |       |
| complication, with   | applicable.  [BG <   |           |         |      |      |       |
| long-term current    | 150: None]  [BG      |           |         |      |      |       |
| use of insulin (HCC) | 150-200: 1 units]    |           |         |      |      |       |
|                      | [-250: 2       |           |         |      |      |       |
|                      | units]  [-300: |           |         |      |      |       |
|                      |  3 units]  [BG       |           |         |      |      |       |
|                      | 301-350: 4 units]    |           |         |      |      |       |
|                      | [-400: 5       |           |         |      |      |       |
|                      | units]  [BG > 400: 6 |           |         |      |      |       |
|                      |  units and call PCP] |           |         |      |      |       |
+----------------------+----------------------+-----------+---------+------+------+-------+
|   aspirin 81 mg      | Take 1 tablet by     |   90      | 0       | 02/2 |      | Activ |
| chewable tablet      | mouth Daily.         | tablet    |         | 4/20 |      | e     |
|                      |                      |           |         | 18   |      |       |
+----------------------+----------------------+-----------+---------+------+------+-------+
|   insulin degludec   | Inject 4 Units under |   1 pen   | 3       | 03/1 |      | Activ |
| (TRESIBA FLEXTOUCH)  |  the skin every      |           |         | 2/20 |      | e     |
| 100 units/mL         | evening.             |           |         | 18   |      |       |
| injectionIndications |                      |           |         |      |      |       |
| : Type 2 diabetes    |                      |           |         |      |      |       |
| mellitus with        |                      |           |         |      |      |       |
| chronic kidney       |                      |           |         |      |      |       |
| disease on chronic   |                      |           |         |      |      |       |
| dialysis, with       |                      |           |         |      |      |       |
| long-term current    |                      |           |         |      |      |       |
| use of insulin (HCC) |                      |           |         |      |      |       |
+----------------------+----------------------+-----------+---------+------+------+-------+
|   acetaminophen      | Take 2 tablets by    |   120     | 0       | 03/1 |      | Activ |
| (TYLENOL) 325 mg     | mouth every 4 hours  | tablet    |         | 6/20 |      | e     |
| tablet               | as needed for Pain   |           |         | 18   |      |       |
|                      | (or fever >= 38.6 C  |           |         |      |      |       |
|                      | (101.5 F)).          |           |         |      |      |       |
 
+----------------------+----------------------+-----------+---------+------+------+-------+
|   LORazepam (ATIVAN) | Take 1 tablet by     |   60      | 0       | 03/1 |      | Activ |
|  1 mg tablet         | mouth every 8 hours  | tablet    |         | 6/20 |      | e     |
|                      | as needed for        |           |         | 18   |      |       |
|                      | Anxiety.             |           |         |      |      |       |
+----------------------+----------------------+-----------+---------+------+------+-------+
|   albuterol 5 mg/mL  | Take 0.5 mLs by      |   20 vial | 0       | 03/1 |      | Activ |
| nebulizer solution   | nebulization every 4 |           |         | 6/20 |      | e     |
|                      |  hours as needed for |           |         | 18   |      |       |
|                      |  Shortness of Breath |           |         |      |      |       |
|                      |  or Increased Work   |           |         |      |      |       |
|                      | of Breathing.        |           |         |      |      |       |
+----------------------+----------------------+-----------+---------+------+------+-------+
|   losartan (COZAAR)  | Take 100 mg by mouth |           | 0       |      |      | Activ |
| 100 MG tablet        |  Daily.              |           |         |      |      | e     |
+----------------------+----------------------+-----------+---------+------+------+-------+
|   esomeprazole       | 2 times daily.       |           | 0       | 05/0 |      | Activ |
| (NEXIUM) 40 mg       |                      |           |         | 2/20 |      | e     |
| capsule              |                      |           |         | 18   |      |       |
+----------------------+----------------------+-----------+---------+------+------+-------+
|   TRINTELLIX 10 MG   | Daily.               |           | 0       | 04/2 |      | Activ |
| tablet               |                      |           |         | 4/20 |      | e     |
|                      |                      |           |         | 18   |      |       |
+----------------------+----------------------+-----------+---------+------+------+-------+
|   nitroglycerin      | Place 1 tablet under |   100     | 3       | 05/0 | 05/0 | Activ |
| (NITROSTAT) 0.4 mg   |  the tongue every 5  | tablet    |         | 7/20 | 7/20 | e     |
| SL tablet            | minutes as needed.   |           |         | 18   | 19   |       |
+----------------------+----------------------+-----------+---------+------+------+-------+
|   atorvaSTATin       | Take 1 tablet by     |           | 0       | 05/1 |      | Activ |
| (LIPITOR) 80 MG      | mouth nightly.       |           |         | 8/20 |      | e     |
| tablet               |                      |           |         | 18   |      |       |
+----------------------+----------------------+-----------+---------+------+------+-------+
|   docusate-senna     | Take 1 tablet by     |           | 0       |      |      | Activ |
| (SENOKOT-S) 50-8.6   | mouth as needed for  |           |         |      |      | e     |
| mg per tablet        | Constipation.        |           |         |      |      |       |
+----------------------+----------------------+-----------+---------+------+------+-------+
|   furosemide (LASIX) | TAKE 1 TABLET BY     |   30      | 3       | 09/0 |      | Activ |
|  20 mg tablet        | MOUTH ONCE DAILY     | tablet    |         |  |      | e     |
|                      |                      |           |         | 18   |      |       |
+----------------------+----------------------+-----------+---------+------+------+-------+
|                      | Take 1 tablet by     |           | 0       | 08/2 |      | Activ |
| HYDROcodone-acetamin | mouth as needed.     |           |         | 1/20 |      | e     |
| ophen (NORCO) 5-325  |                      |           |         | 18   |      |       |
| mg per tablet        |                      |           |         |      |      |       |
+----------------------+----------------------+-----------+---------+------+------+-------+
|   ondansetron        | Take 1 tablet by     |           | 0       | 08/2 |      | Activ |
| (ZOFRAN ODT) 4 mg    | mouth as needed.     |           |         | 1/20 |      | e     |
| disintegrating       |                      |           |         | 18   |      |       |
| tablet               |                      |           |         |      |      |       |
+----------------------+----------------------+-----------+---------+------+------+-------+
|   warfarin           | Take 2 mg by mouth   |           | 0       |      |      | Activ |
| (COUMADIN) 2 mg      | Daily. 1  tablet  |           |         |      |      | e     |
| tablet               | daily                |           |         |      |      |       |
+----------------------+----------------------+-----------+---------+------+------+-------+
|   traMADol (ULTRAM)  | Take 50 mg by mouth  |           | 0       |      |      | Activ |
| 50 mg tablet         | as needed.           |           |         |      |      | e     |
+----------------------+----------------------+-----------+---------+------+------+-------+
|   carvedilol (COREG) | Take 1.5 tablets by  |   90      | 5       |  |      | Activ |
|  12.5 mg tablet      | mouth 2 times daily  | tablet    |         |  |      | e     |
|                      | (with breakfast &    |           |         | 18   |      |       |
 
|                      | dinner).             |           |         |      |      |       |
+----------------------+----------------------+-----------+---------+------+------+-------+
|   ondansetron        | Take 1 tablet by     |   24      | 0       |  |      | Activ |
| (ZOFRAN ODT) 4 mg    | mouth every 8 hours  | tablet    |         |  |      | e     |
| disintegrating       | as needed for        |           |         | 18   |      |       |
| tablet               | Nausea.              |           |         |      |      |       |
+----------------------+----------------------+-----------+---------+------+------+-------+
 
 
 
 Active Problems
 
 
+------------+------------+
| Problem    | Noted Date |
+------------+------------+
| Chest pain | 2018 |
+------------+------------+
 
 
 
+-------------------------------------------------------------------+
|   Overview:   Chest XR 9/15/2018 - Findings are suggestive of     |
| mild pulmonary edema with small pleural effusions.  Borderline    |
| cardiomegaly. Edward Dailey MD Stress test done on 18     |
| shows pharmacologic stress notable for baseline abnormal ECG      |
| without diagnostic changes post stress, mild LV enlargement with  |
| LVEF 33% reviewed, perfusion imaging notable for moderate sized   |
| high intensity area of reduced uptake involving the mid to apical |
|  inferior myocardium as well as the LV apex with minimal          |
| reversibility, consistent with patient's known occluded RCA,  no  |
| significant additional ischemia is noted, overall this is         |
| consistent with the patient's known history of multivessel CAD    |
| and ischemic cardiomyopathy.X-Ray chest 2/10/2019 shows small     |
| bilateral pleural effusions with some mild probable bibasilar     |
| atelectasis, no fulminant pulmonary edema or focal infiltrate is  |
| seen, by Tip Pinto DO.                                  |
+-------------------------------------------------------------------+
 
 
 
+-------------------------------+------------+
| ACS (acute coronary syndrome) | 09/15/2018 |
+-------------------------------+------------+
 
 
 
+-------------------------------------------------------------------+
|   Overview:   Echocardiogram on 2018 shows, mild left        |
| ventricular chamber dilatation with upper normal wall thickness,  |
| moderate global left ventricular systolic dysfunction with a      |
| visually estimated left ventricular ejection fraction of 30-35%,  |
| mild left atrial chamber enlargement, surgically repaired mitral  |
| valve with normal function, mild aortic valve regurgitation, mild |
|  pulmonary arterial hypertension, when compared with prior        |
| echocardiogram dated 18, no significant interval change, by |
|  Dejan Breaux MD.                                               |
+-------------------------------------------------------------------+
 
 
 
 
+------------------------------------------------------------------+------------+
| Implantable defibrillator reprogramming/check                    | 2018 |
+------------------------------------------------------------------+------------+
| ICD (implantable cardioverter-defibrillator) [employer]   | 2018 |
| 18 Dora                                                 |            |
+------------------------------------------------------------------+------------+
 
 
 
+-------------------------------------------------------------------+
|   Overview:   Formatting of this note might be different from the |
|  original. MODEL NAME MODEL# SERIAL# DATE IMPLANTED GENERATOR     |
| [employer] Dynagen EL ICD DF4 [id number] 18 RV LEAD   |
| [employer] [place] [address] [place] [id number] 18           |
| Indication: Nonischemic dilated cardiopathy with persistent       |
| severely depressed left ventricular systolic function, LVEF of    |
| LVEF of 30%                                                       |
+-------------------------------------------------------------------+
 
 
 
+------------------+------------+
| Pleural effusion | 2018 |
+------------------+------------+
 
 
 
+-------------------------------------------------------------------+
|   Last Assessment & Plan:   Post MV repair and CABG x2 2018  |
| and recurrent pleural effusions and elevated ESR. Plan:Received 1 |
|  time dose of Colchicine Started on Prednisone per surgery CXR    |
| today shows stable bilateral pleural effusions                    |
|Started on Prednisone per surgery                                  |
|CXR today shows stable bilateral pleural effusions                 |
+-------------------------------------------------------------------+
 
 
 
+---------------------------------------+------------+
| Steroid-induced hyperglycemia         | 2018 |
+---------------------------------------+------------+
| Moderate protein-calorie malnutrition | 2018 |
+---------------------------------------+------------+
| Chronic systolic heart failure        | 2018 |
+---------------------------------------+------------+
 
 
 
+-------------------------------------------------------------------+
|   Overview:   Echocardiogram 2018 shows mild biatrial        |
| dilatation, mild left ventricular dilatation with a mild          |
| eccentric left ventricular hypertrophy, there is a moderate       |
| global hypokinesis of left ventricle, lvef is 30-35%, normal      |
| right ventricular size and wall thickness with a mildly reduced   |
| right ventricular systolic function, mildly thickened and         |
| calcified trileaflet aortic valve with adequate opening, there is |
|  a mild aortic valve insufficiency, mildly thickened and          |
| calcified mitral valve suggesting myxomatous change with evidence |
|  of mitral valve repair, there is at least moderate central       |
 
| mitral valve regurgitation, mild tricuspid valve regurgitation,   |
| mild to moderate pulmonary hypertension with a peak systolic      |
| pressure of 50-55 mmhg, normal ivc with normal respiratory        |
| collapse, when compared to echocardiography on 3/7/18, left       |
| ventricular systolicsystolic function is slightly                 |
| improved.Echocardiogram 2018 show overall left ventricular    |
| systolic function is severely impaired with, an EF between 20 -   |
| 25 %, there is mild aortic regurgitation, there is mild aortic    |
| stenosis present, mild-to-moderate tricuspid regurgitation        |
| present, there is mild pulmonary hypertension, pleural effusion   |
| present. Duglas Ornelas MD, FACC; [facility]   Last Assessment & Plan:  |
|   EF 25% s/p CABG on 18 but now down to 15%. Plan:Continue   |
| Coreg and Losartan Hemodialysis for fluid removal.Strict I/O and  |
| daily weights.                                                    |
+-------------------------------------------------------------------+
 
 
 
+------------------------------------------+------------+
| Stress hyperglycemia                     | 2018 |
+------------------------------------------+------------+
| Anemia in ESRD (end-stage renal disease) | 2018 |
+------------------------------------------+------------+
| PAF (paroxysmal atrial fibrillation)     | 2018 |
+------------------------------------------+------------+
 
 
 
+-------------------------------------------------------------------+
|   Overview:   PAF with dialysis in the past and recurrent post op |
|  MV/CABG surgery.   Last Assessment & Plan:   Remains in          |
| NSRNormal atrial size by echocardiogram. Plan:Continue Coreg with |
|  up-titration as toleratedAmiodarone 400 mg BID while inpatient   |
| and then transition to 200 mg BID for 2 weeks and then 200 mg     |
| daily. Continue po 1-3 months post discharge. Continue warfarin   |
| for CVA prophylaxis, INR 2.3 today                                |
|Continue warfarin for CVA prophylaxis, INR 2.3 today               |
+-------------------------------------------------------------------+
 
 
 
+-------------------------+------------+
| S/P mitral valve repair | 2018 |
+-------------------------+------------+
 
 
 
+-------------------------------------------------------------------+
|   Overview:   Cristopher ring 2.16.18 ([facility]) for severe MR. POLY    |
| left. CABG too, SVG x 2 to OM and RCA.  Last Assessment & Plan:   |
|  Cristopher ring 2.16.18 ([facility]) for severe MR. POLY left. CABG too, |
|  SVG x 2 to OM and RCA.                                           |
+-------------------------------------------------------------------+
 
 
 
+--------------+------------+
| S/P CABG x 2 | 2018 |
+--------------+------------+
 
 
 
 
+------------------------------------------------------------------+
|   Overview:   SVG's to OM and RCA at time of MV ring Cristopher    |
| #28. POLY left.  Last Assessment & Plan:   18: Mitral valve  |
| repair with a 28 Cristopher annuloplasty ring; CABG x2 (SVG to OM  |
| and SVG to RCA)Plan:ASA, statin, BB and ARB                      |
|ASA, statin, BB and ARB                                           |
+------------------------------------------------------------------+
 
 
 
+----------------------------------+------------+
| Cardiomyopathy, unspecified type | 2018 |
+----------------------------------+------------+
 
 
 
+-------------------------------------------------------------------+
|   Overview:   Ischemic (3 V CAD) and non ischemic (Hypertension   |
| and MR and DM and ESRD). Tolerated metoprolol, losartan but avoid |
|  spironolactone due to ESRD. Echocardiogram done on 10/24/18      |
| shows mild biatrial dilatation, mild left ventricular dilatation  |
| with a mild eccentric left ventricular hypertrophy, there is a    |
| severe global hypokinesis of the left ventricle, overall, left    |
| ventricular systolic function is severely decreased, LVEF is      |
| 25-30%, mildly thickened and calcified trileaflet aortic valve    |
| with adequate opening, there is aortic valve sclerosis without    |
| significant aortic valve stenosis, there is a mild aortic valve   |
| insufficiency, mildly thickened and calcified mitral valve        |
| suggesting myxoma change with evidence of mitral valve repair,    |
| there is a mild to mitral valve stenosis and a mild mitral valve  |
| regurgitation, mild mitral annular calcification, mild tricuspid  |
| valve regurgitation, mild pulmonary hypertension with a peak      |
| systolic pressure 40-45 mmHg, normal IVC with normal respiratory  |
| collapse, when compared to echocardiography on 18, left      |
| ventricular systolic function continued to be worsened. Johnie    |
| MD Dora  Last Assessment & Plan:   Severe ischemic          |
| cardiomyopathy (EF 15%) and ESRD on HD who presented with dyspnea |
|  related to bilateral pleural effusions and episodic AF with RVR  |
| now s/p thoracentesis with significant improvement in her         |
| symptoms and in NSR. Plan:Volume removal with dialysis as         |
| toleratedLasix 80 mg twice a day Coreg and low dose losartan. Up  |
| titrate as tolerated but working on fluid removal and maintaining |
|  adequate BP. Strict I/O with daily weights.                      |
+-------------------------------------------------------------------+
 
 
 
+--------------------------------------------------------------+---+
| NSTEMI (non-ST elevated myocardial infarction)               |   |
+--------------------------------------------------------------+---+
| Coronary artery disease involving native coronary artery of  |   |
| native heart without angina pectoris                         |   |
+--------------------------------------------------------------+---+
 
 
 
+-------------------------------------------------------------------+
|   Overview:   Stress test on 2018 shows pharmacologic stress |
 
|  notable for baseline abnormal ECG without diagnostic changes     |
| post stress, mild LV enlargement with LVEF 33% reviewed,          |
| perfusion imaging notable for moderate sized high intensity area  |
| of reduced uptake involving the mid to apical inferior myocardium |
|  as well as the LV apex with minimal reversibility, consistent    |
| with patient's known occluded RCA, no significant additional      |
| ischemia is noted, overall this is consistent with the patient's  |
| known history of multivessel CAD and ischemic cardiomyopathy, by  |
| Anthony Gunn MD.Echocardiogram on 2018 shows mild left     |
| ventricular chamber dilatation with upper normal wall thickness,  |
| moderate global left ventricular systolic dysfunction with a      |
| visually estimated left ventricular ejection fraction of 30-35%,  |
| mild left atrial chamber enlargement, surgically repaired mitral  |
| valve with normal function, mild aortic valve regurgitation, mild |
|  pulmonary arterial hypertension, when compared with prior        |
| echocardiogram dated 18, no significant interval change, by |
|  Dejan Breaux MD. C on 2012 shows totally occluded right |
|  coronary artery with collateral filling from the left, no        |
| significant stenoses within the left anterior descending artery   |
| and circumflex artery. Patent stents within the left anterior     |
| descending artery, normal intracardiac pressure, mild narrowing   |
| within the distal aorta and iliac arteries. - Sherie Bartholomew,   |
| MDEchocardiogram on 2017 shows Study: a 2-dimensional        |
| transthoracic echocardiogram with m-mode, spectral and color flow |
|  Doppler was performed, left ventricular systolic function is     |
| low-normal with, an EF between 50 - 55 %, the left ventricle      |
| cavity size is normal, the RV is normal in size and function, the |
|  left atrium is normal in size, the right atrium is normal in     |
| size, aortic valve is trileaflet and is mildly thickened, there   |
| is mild aortic regurgitation, there is no evidence of aortic      |
| stenosis, the mitral valve is normal. - Kyler Pettit [facility] |
|  on 2017 shows severe triple-vessel coronary artery disease   |
| with moderate ostial left anterior descending artery and patent   |
| stent in the midsegment, moderate stenosis in the second obtuse   |
| marginal branch and total occlusion of the proximal right         |
| coronary artery, which is known from before, given the patient's  |
| symptoms at this time, recommendation given. The patient          |
| understands. We will proceed with further medical management with |
|  blood pressure control. Also, we will optimize our blood         |
| pressure management. Further evaluation for the ostial left       |
| anterior descending artery and circumflex lesion upon recurrent   |
| symptoms for the patient, the patient will be evaluated for any   |
| further symptoms and fractional flow reserve of the left anterior |
|  descending artery and possible intervention on the left          |
| circumflex system will be considered. - [facility]   |
| on 2017 shows severe 3-vessel coronary artery disease with   |
| moderate to severe proximal left anterior descending with         |
| significant fractional flow reserve value of 0.77, severe mid     |
| large marginal branch, and chronically occluded right coronary    |
| artery with left-to-right collaterals, normal left ventricular    |
| end-diastolic pressure. - [facility]     |
| shows three-vessel coronary artery disease with a chronically     |
| occluded right coronary artery with left-to-right collaterals,    |
| severe proximal left anterior descending artery with a patent     |
| stent in the mid left anterior descending artery and a            |
| significant FFR value of 0.77 during diagnostic angiogram on      |
| 2017, and severe disease of the second marginal branch |
|  of the left circumflex artery, successful PTCA and stenting of   |
| the second marginal branch of the left circumflex artery using a  |
| 2.25 x 16 and a 2.25 x 8 mm overlapping synergy drug-eluting      |
 
| stents postdilated using a 2.25 noncompliant balloon, successful  |
| direct stenting of the proximal and ostium of the left anterior   |
| descending artery using a 3.5 x 16 mm Synergy drug-eluting stent  |
| postdilated using a 3.5 noncompliant balloon. - Marcos Gamboa, |
|  MDEchocardiogram on 2018 shows the left ventricle is normal |
|  in size, wall thickness and moderately impaired systolic         |
| function EF 35-40%. Inferior, inferolateral and anterolateral     |
| hypokinesis, the right ventricle is normal in size and function,  |
| severe mitral regurgitation with moderately dilated left atrium,  |
| mild tricuspid regurgitation and mild pulmonary hypertension RVSP |
|  48 mmHg, there is no pericardial effusion, new wall motion       |
| abnormalities and new severe mitral regurgitation compared to     |
| prior study. - ALYCIA Diane on 2018 shows         |
| three-vessel coronary artery disease with widely patent stent in  |
| the left anterior descending artery and severe stent restenosis   |
| in the marginal branch, occluded right coronary artery with       |
| collateral from left to right, severe left ventricular            |
| dysfunction with severe mitral regurgitation, status post         |
| percutaneous transluminal coronary angioplasty of in-stent        |
| restenosis within the marginal branch, recommend consideration    |
| for mitral valve replacement and 2-vessel coronary artery bypass  |
| surgery to the right coronary artery and marginal branch. - Iyad  |
| TONIA ChanE and CABG x2 on 2018 shows Severely reduced LV  |
| systolic function. Visually estimated LVEF 25%, normal LV size    |
| and shape. Normal wall thickness, normal RV size with normal      |
| function, LA enlarged. POLY normal size without SEC, masses, or    |
| thrombi. IAS intact, no PFO detected, mitral valve with bileaflet |
|  thickening and severely restricted motion, associated with       |
| ventricular tethering. Severe functional MR with central jet,     |
| trileaflet aortic valve with mild sclerosis. No significant       |
| aortic stenosis. Mild AI with central jet, tricuspid valve normal |
|  structure and function. Trace TR, pulmonic valve normal Trace    |
| TN, visible portions of ascending aorta and arch normal. Grade II |
|  atherosclerotic disease of descending aorta, pulmonary artery    |
| normal, normal pericardium. No pericardial or pleural effusions,  |
| left ventricular function slightly improved and right ventricular |
|  function unchanged compared to preop,  28 mm mitral valve        |
| annuloplasty ring is well-seated with an acceptable inflow        |
| gradient across the mitral valve and no MR noted, no change in    |
| the aortic, tricuspid, or pulmonic valves compared to preop, no   |
| change in visible portions of aorta after decannulation, LV and   |
| RV function unchanged after sternal closure. - Jagjit Hickey,       |
| MDEchocardiogram on 3/7/2018 shows a complete two-dimensional     |
| transthoracic echocardiogram was performed (2D, M-mode, Doppler   |
| and color flow Doppler), left ventricular systolic function is    |
| severely reduced, the visually estimated LV ejection fraction is  |
| 15%, the left and right atrial chambers are both normal in size,  |
| there is mild mitral regurgitation, an annuloplasty ring is noted |
|  in the mitral position, there is mild tricuspid regurgitation,   |
| Estimated PA pressure is 42 mmHg, the aortic valve leaflets       |
| appear mildly calcified, the aortic valve opens well, the aortic  |
| valve mean systolic gradient was measured at 9 mm Hg. - Star    |
| MD Alvarez  Last Assessment & Plan:   [facility]GI in [facility] 5 months |
|  ago. 1 week off Plavix for CAGG/MVr surgery 16. Now and in the |
|  past with PAFAlso statin - data in dialysis patients for primary |
|  prevention with statin is not beneficial but this is secondary   |
| prevention. However, instead of high dose statin, moderate dose   |
| due to ESRD with slight increased risk of rhabdo. - Warfarin -    |
| ASA - Moderate dose Statin                                        |
+-------------------------------------------------------------------+
 
 
 
 
+----------------------+---+
| Mixed hyperlipidemia |   |
+----------------------+---+
 
 
 
+-------------------------------------------------------------+
|   Last Assessment & Plan:   Moderate dose statin and don't  |
| titrate due to ESRD and ASHD. - Continue Atorvastatin 20mg  |
+-------------------------------------------------------------+
 
 
 
+------------------------------------------------------------------+---+
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
| dialysis, with long-term current use of insulin                  |   |
+------------------------------------------------------------------+---+
| ESRD (end stage renal disease)                                   |   |
+------------------------------------------------------------------+---+
 
 
 
+--------------------------------------------------------+
|   Last Assessment & Plan:   Dialysis Tues, Thurs, Sat. |
| 2 L removal on HD yesterday and 1.1 L today            |
| Nephrology following.                                  |
| Chronic ESRD anemia.                                   |
|                                                        |
| Plan:                                                  |
| Continue Lasix and HD                                  |
+--------------------------------------------------------+
 
 
 
+-------------------------+---+
| Hypertension, essential |   |
+-------------------------+---+
 
 
 
+------------------------------------------------------------------+
|   Last Assessment & Plan:   Will resume metoprolol and losartan  |
| as BP allows.                                                    |
+------------------------------------------------------------------+
 
 
 
+----------------------------------------------+---+
| COPD (chronic obstructive pulmonary disease) |   |
+----------------------------------------------+---+
| Severe mitral regurgitation                  |   |
+----------------------------------------------+---+
 
 
 
 Resolved Problems
 
 
 
+-------------------------------+----------+-----------+
| Problem                       | Noted    | Resolved  |
|                               | Date     | Date      |
+-------------------------------+----------+-----------+
| Junctional cardiac arrhythmia | 20 |  |
|                               | 18       | 8         |
+-------------------------------+----------+-----------+
 
 
 
+----------------------------------------------------------------+
|   Last Assessment & Plan:   Persistent. Will stop Amiodarone.  |
+----------------------------------------------------------------+
 
 
 
 Encounters
 
 
+--------+-----------+-----------+----------------------+----------------------+
| Date   | Type      | Specialty | Care Team            | Description          |
+--------+-----------+-----------+----------------------+----------------------+
| / | Telephone |           |   Johnie John, | Other (angina)       |
| 2019   |           |           |  MD                  |                      |
+--------+-----------+-----------+----------------------+----------------------+
| / | Implant   |           |   Johnie John, | Remote Device        |
|    | Monitor   |           |  MD                  | Interrogation        |
|        |           |           |                      | (Primary Dx); ICD    |
|        |           |           |                      | (implantable         |
|        |           |           |                      | cardioverter-defibri |
|        |           |           |                      | llator) [employer]       |
|        |           |           |                      | Scientific  18   |
|        |           |           |                      | Dora;           |
|        |           |           |                      | Cardiomyopathy,      |
|        |           |           |                      | unspecified type     |
|        |           |           |                      | (HCC)                |
+--------+-----------+-----------+----------------------+----------------------+
| / | Telephone |           |   Johnie John, | Lab Order (Pt due    |
|    |           |           |  MD                  | for labs prior to    |
|        |           |           |                      | appt. )              |
+--------+-----------+-----------+----------------------+----------------------+
| / | Telephone |           |   Johnie John, | Other (medication    |
|    |           |           |  MD                  | changed due to ER    |
|        |           |           |                      | visit)               |
+--------+-----------+-----------+----------------------+----------------------+
 from Last 3 Months
 
 Family History
 
 
+------------------+-----------+------+----------+
| Medical History  | Relation  | Name | Comments |
+------------------+-----------+------+----------+
| High cholesterol | Father    |      |          |
+------------------+-----------+------+----------+
| Hypertension     | Father    |      |          |
+------------------+-----------+------+----------+
| PVD              | Father    |      |          |
+------------------+-----------+------+----------+
 
| Dementia         | Mother    |      |          |
+------------------+-----------+------+----------+
| Diabetes         | Paternal  |      |          |
|                  | Grandmoth |      |          |
|                  | er        |      |          |
+------------------+-----------+------+----------+
| Kidney disease   | Neg Hx    |      |          |
+------------------+-----------+------+----------+
 
 
 
+----------------------+------+----------+----------+
| Relation             | Name | Status   | Comments |
+----------------------+------+----------+----------+
| Father               |      |  | PVD      |
+----------------------+------+----------+----------+
| Mother               |      |  | Dementia |
+----------------------+------+----------+----------+
| Paternal Grandmother |      |  |          |
+----------------------+------+----------+----------+
 
 
 
 Social History
 
 
+---------------+------------+-----------+--------+------------------+
| Tobacco Use   | Types      | Packs/Day | Years  | Date             |
|               |            |           | Used   |                  |
+---------------+------------+-----------+--------+------------------+
| Former Smoker | Cigarettes | 1         | 30     | Quit: 2004 |
+---------------+------------+-----------+--------+------------------+
 
 
 
+---------------------+---+---+---+
| Smokeless Tobacco:  |   |   |   |
| Never Used          |   |   |   |
+---------------------+---+---+---+
 
 
 
+-------------+-----------+---------+----------+
| Alcohol Use | Drinks/We | oz/Week | Comments |
|             | ek        |         |          |
+-------------+-----------+---------+----------+
| No          |           |         |          |
+-------------+-----------+---------+----------+
 
 
 
+------------------+---------------+
| Sex Assigned at  | Date Recorded |
| Birth            |               |
+------------------+---------------+
| Not on file      |               |
+------------------+---------------+
 
 
 
 
+----------------+-------------+-------------+
| Job Start Date | Occupation  | Industry    |
+----------------+-------------+-------------+
| Not on file    | Not on file | Not on file |
+----------------+-------------+-------------+
 
 
 
+----------------+--------------+------------+
| Travel History | Travel Start | Travel End |
+----------------+--------------+------------+
 
 
 
+-------------------------------------+
| No recent travel history available. |
+-------------------------------------+
 
 
 
 Last Filed Vital Signs
 
 
+-------------------+----------------------+---------------------+
| Vital Sign        | Reading              | Time Taken          |
+-------------------+----------------------+---------------------+
| Blood Pressure    | 125/55               | 2018 PDT |
+-------------------+----------------------+---------------------+
| Pulse             | 62                   | 2018 PDT |
+-------------------+----------------------+---------------------+
| Temperature       | 37   C (98.6   F)    | 2018 PDT |
+-------------------+----------------------+---------------------+
| Respiratory Rate  | 16                   | 2018 PDT |
+-------------------+----------------------+---------------------+
| Oxygen Saturation | 97%                  | 2018 PDT |
+-------------------+----------------------+---------------------+
| Inhaled Oxygen    | -                    | -                   |
| Concentration     |                      |                     |
+-------------------+----------------------+---------------------+
| Weight            | 65.3 kg (143 lb 15.4 | 2018 06 PDT |
|                   |  oz)                 |                     |
+-------------------+----------------------+---------------------+
| Height            | 177.8 cm (5' 10")    | 09/15/2018 0045 PDT |
+-------------------+----------------------+---------------------+
| Body Mass Index   | 20.66                | 09/15/2018 0045 PDT |
+-------------------+----------------------+---------------------+
 
 
 
 Plan of Treatment
 
 
+--------+---------+-----------+----------------------+-------------+
| Date   | Type    | Specialty | Care Team            | Description |
+--------+---------+-----------+----------------------+-------------+
| / | Office  |           |   Johnie John, |             |
| 2019   | Visit   |           |  MD  [address] |             |
|        |         |           |  [place],   |             |
|        |         |           | [place] [address]             |             |
|        |         |           | [phone]         |             |
 
|        |         |           | [phone] (Fax)   |             |
+--------+---------+-----------+----------------------+-------------+
 
 
 
+----------------------+-----------+------------+----------+
| Health Maintenance   | Due Date  | Last Done  | Comments |
+----------------------+-----------+------------+----------+
| Hepatitis C          |  |            |          |
| Screening            | 9         |            |          |
+----------------------+-----------+------------+----------+
| Diabetic Eye Exam    |  |            |          |
|                      | 7         |            |          |
+----------------------+-----------+------------+----------+
| Diabetic Foot Exam   |  |            |          |
|                      | 7         |            |          |
+----------------------+-----------+------------+----------+
| Vaccine:             |  |            |          |
| Dtap/Tdap/Td (1 -    | 8         |            |          |
| Tdap)                |           |            |          |
+----------------------+-----------+------------+----------+
| Breast Cancer        |  |            |          |
| Screening (Ages      | 9         |            |          |
| 50-74)               |           |            |          |
+----------------------+-----------+------------+----------+
| Colorectal Cancer    |  |            |          |
| Screening            | 9         |            |          |
| (Colonoscopy)        |           |            |          |
+----------------------+-----------+------------+----------+
| Vaccine: Zoster (1   |  |            |          |
| of 2)                | 9         |            |          |
+----------------------+-----------+------------+----------+
| Vaccine:             |  | 2012 |          |
| Pneumococcal 65+     | 4         |            |          |
| High/Highest Risk (1 |           |            |          |
|  of 2 - PCV13)       |           |            |          |
+----------------------+-----------+------------+----------+
| Adult Annual         |  |            |          |
| Wellness Visit       | 6         |            |          |
+----------------------+-----------+------------+----------+
| Hemoglobin A1c       | 05/15/201 | 02/15/2018 |          |
| Screening            | 8         |            |          |
+----------------------+-----------+------------+----------+
| Vaccine: Influenza   |  |            |          |
| (Season Ended)       | 9         |            |          |
+----------------------+-----------+------------+----------+
 
 
 
 Implants
 
 
+-------------------------------+--------+-------+-------------+--------+--------+--------+
| Implanted                     | Type   | Area  | Manufacture | Device | Shelf  | Model  |
|                               |        |       | r           |        | Expira | /      |
|                               |        |       |             | Identi | tion   | Serial |
|                               |        |       |             | fier   | Date   |  / Lot |
+-------------------------------+--------+-------+-------------+--------+--------+--------+
| Ring Annlplst Cosgrv 28mm -   | Generi | N/A:  | CHRIS     |        | / | [id number] |
| [id number]: Qty: 1 on  | c      | Heart | LIFESCIENCE |        |    | MM     |
 
| 2018 by Anthony Beth   |        |       | [employer] -     |        |        | /[id number] |
| MD JIMMY                         |        |       | EDLS        |        |        | 34 /   |
+-------------------------------+--------+-------+-------------+--------+--------+--------+
| Rituagen El Icd Vr Implanted:  | Implan | N/A:  | BOSTON      |        | / | [id number]   |
| Qty: 1 on 2018 by       | table  | Chest | [employer]  |        |    | /[id number] |
| Johine John MD         | Cardio |       | [employer] [employer] |        |        | 3      |
|                               | verter |       |             |        |        | / |
|                               |        |       |             |        |        | 9      |
|                               | Defibr |       |             |        |        |        |
|                               | illato |       |             |        |        |        |
|                               | r      |       |             |        |        |        |
+-------------------------------+--------+-------+-------------+--------+--------+--------+
| Endotak [place] Sg           | Lead   | N/A:  | BOSTON      |        | 10/21/ | 292   |
| Implanted: Qty: 1 on          |        | Chest | [employer]  |        |    | /[id number] |
| 2018 by Dora,      |        |       | [employer] |        |        | 5      |
| MD Johnie                    |        |       |             |        |        | /[id number] |
|                               |        |       |             |        |        | 0      |
+-------------------------------+--------+-------+-------------+--------+--------+--------+
 
 
 
 Procedures
 
 
+-----------------+--------+-------------+----------------------+----------------------+
| Procedure Name  | Priori | Date/Time   | Associated Diagnosis | Comments             |
|                 | ty     |             |                      |                      |
+-----------------+--------+-------------+----------------------+----------------------+
| DEVICE          | Routin | 2019  |   Remote Device      |   Results for this   |
| INTERROGATION-  | e      | 23:59 PDT   | Interrogation   ICD  | procedure are in the |
| REMOTE          |        |             | (implantable         |  results section.    |
|                 |        |             | cardioverter-defibri |                      |
|                 |        |             | llator) [facility]       |                      |
|                 |        |             | Scientific  18   |                      |
|                 |        |             | Dora            |                      |
|                 |        |             | Cardiomyopathy,      |                      |
|                 |        |             | unspecified type     |                      |
|                 |        |             | ([facility])                |                      |
+-----------------+--------+-------------+----------------------+----------------------+
 from Last 3 Months
 
 Results
 Device Interrogation - Remote (2019 23:59 PDT)
 
+-----------------------------------------------------------------------+--------------+
| Narrative                                                             | Performed At |
+-----------------------------------------------------------------------+--------------+
|   This result has an attachment that is not available.  Johnie        |   [facility]    |
| MD Dora         2019 15:05Date of Remote Interrogation:    |              |
| 19 Refer to [employer] documentation and remote PDF scanned into    |              |
| EPIC for remote interrogation results.    Data collected by Clementina SALAZAR     |              |
| NAHUN Maynard Presenting rhythm:    sinus rhythm 59-60 beats.0           |              |
| ventricular high rate episodes. No tachycardia therapies recommended  |              |
| or delivered.Histogram    good.     Battery longevity                 |              |
| 12    years.Apparent normal and stable device function.Device         |              |
| interrogation due in office in 2019.                        |              |
|recommended or delivered.                                              |              |
|Histogram    good.     Battery longevity 12    years.                 |              |
|Apparent normal and stable device function.                            |              |
|Device interrogation due in office in 2019.                  |              |
 
|                                                                       |              |
+-----------------------------------------------------------------------+--------------+
 
 
 
+--------------+---------+--------------------+--------------+
| Performing   | Address | City/State/Zipcode | Phone Number |
| Organization |         |                    |              |
+--------------+---------+--------------------+--------------+
|   [facility]    |         |                    |              |
+--------------+---------+--------------------+--------------+
 from Last 3 Months
 
 Insurance
 
 
+-----------------+--------+-------------+--------+-------------+---------+--------+
| Payer           | Benefi | Subscriber  | Effect | Phone       | Address | Type   |
|                 | t Plan | ID          | rakesh    |             |         |        |
|                 |  /     |             | Dates  |             |         |        |
|                 | Group  |             |        |             |         |        |
+-----------------+--------+-------------+--------+-------------+---------+--------+
| MEDICARE        | MEDICA | [id number]  | 20 | [phone] |         | Medica |
|                 | RE     |             | 03-Pre | 5           |         | re     |
|                 | PART A |             | sent   |             |         |        |
|                 |  AND B |             |        |             |         |        |
+-----------------+--------+-------------+--------+-------------+---------+--------+
| MEDICAID [place] | MEDICA | [id number]    |  | [phone] |         | Medica |
|                 | [place]  |             | 018-Pr | 2           |         | id     |
|                 | PLUS   |             | esent  |             |         |        |
+-----------------+--------+-------------+--------+-------------+---------+--------+
 
 
 
+----------------------+--------+-------------+--------+-------------+-----------------+
| Guarantor Name       | Accoun | Relation to | Date   | Phone       | Billing Address |
|                      | t Type |  Patient    | of     |             |                 |
|                      |        |             | Birth  |             |                 |
+----------------------+--------+-------------+--------+-------------+-----------------+
| Cherie Villalobos | Person | Self        | / |             |   [address]    |
|                      | al/Fam |             | 1950   | [phone] | [place]    |
|                      | melina    |             |        | 0 (Home)    | [address]      |
+----------------------+--------+-------------+--------+-------------+-----------------+
 
 
 
 Advance Directives
 Patient has advance care planning documents, and code status on file. For more information,
 please contact:[facility] [place] [address]
 
+-------------+-------------+-------------+----------+
| Code Status | Date        | Date        | Comments |
|             | Activated   | Inactivated |          |
+-------------+-------------+-------------+----------+
| Full Code   | 9/15/2018   | 2018   |          |
|             | 0:51        | 19:33       |          |
+-------------+-------------+-------------+----------+
 
 
 
 
+-----------+-----------+------------+---+
|           |           |            |   |
+-----------+-----------+------------+---+
| Full Code | 3/6/2018  | 3/16/2018  |   |
|           | 17:36     | 13:11      |   |
+-----------+-----------+------------+---+
 
 
 
+-----------+------------+------------+---+
|           |            |            |   |
+-----------+------------+------------+---+
| Full Code | 2018  | 2018  |   |
|           | 17:49      | 14:01      |   |
+-----------+------------+------------+---+

## 2019-04-19 NOTE — XMS
Encounter Summary
  Created on: 2019
 
 Cherie Villalobos
 External Reference #: [id number]
 : 48
 Sex: Female
 
 Demographics
 
 
+-----------------------+--------------------------+
| Address               | [address]               |
|                       | [place]  [address] |
+-----------------------+--------------------------+
| Home Phone            | [phone]          |
+-----------------------+--------------------------+
| Preferred Language    | Unknown                  |
+-----------------------+--------------------------+
| Marital Status        |                   |
+-----------------------+--------------------------+
| Catholic Affiliation | 1013                     |
+-----------------------+--------------------------+
| Race                  | Unknown                  |
+-----------------------+--------------------------+
| Ethnic Group          | Unknown                  |
+-----------------------+--------------------------+
 
 
 Author
 
 
+--------------+-----------------------+
| Author       | [facility]ry [facility] [employer] [facility] [employer] |
+--------------+-----------------------+
| Organization | [facility] |
+--------------+-----------------------+
| Address      | Unknown               |
+--------------+-----------------------+
| Phone        | Unavailable           |
+--------------+-----------------------+
 
 
 
 Support
 
 
+---------------+--------------+---------------------+-----------------+
| Name          | Relationship | Address             | Phone           |
+---------------+--------------+---------------------+-----------------+
| Anoop Villalobos | ECON         | [place], | [phone] |
|               |              |  [place]  [address]     |                 |
+---------------+--------------+---------------------+-----------------+
| Jennifer Dickson | ECON         | [place]       | [phone] |
|               |              | [phone]               |                 |
+---------------+--------------+---------------------+-----------------+
 
 
 
 
 Care Team Providers
 
 
+-----------------------+------+-----------------+
| Care Team Member Name | Role | Phone           |
+-----------------------+------+-----------------+
| Ivy Couch DO      | PCP  | [phone] |
+-----------------------+------+-----------------+
 
 
 
 Reason for Visit
 
 
+--------+--------------------------------+
| Reason | Comments                       |
+--------+--------------------------------+
| Other  | [facility] medical records request |
+--------+--------------------------------+
 
 
 
 Encounter Details
 
 
+--------+-------------+----------------------+---------------------+------------------+
| Date   | Type        | Department           | Care Team           | Description      |
+--------+-------------+----------------------+---------------------+------------------+
| / | Documentati |   [facility]      |   Peabody, Jessica  | Other Jean-Paul    |
| 2019   | on Only     | [address]        | [facility]               | medical records  |
|        |             | [address]   |                     | request)         |
|        |             | [place] [address]   |                     |                  |
|        |             | [phone]         |                     |                  |
+--------+-------------+----------------------+---------------------+------------------+
 
 
 
 Social History
 
 
+---------------+------------+-----------+--------+------------------+
| Tobacco Use   | Types      | Packs/Day | Years  | Date             |
|               |            |           | Used   |                  |
+---------------+------------+-----------+--------+------------------+
| Former Smoker | Cigarettes | 1         | 30     | Quit: 2004 |
+---------------+------------+-----------+--------+------------------+
 
 
 
+---------------------+---+---+---+
| Smokeless Tobacco:  |   |   |   |
| Never Used          |   |   |   |
+---------------------+---+---+---+
 
 
 
+------------------------------+
| Comments: quite smoking 2004 |
+------------------------------+
 
 
 
 
+-------------+-----------+---------+----------+
| Alcohol Use | Drinks/We | oz/Week | Comments |
|             | ek        |         |          |
+-------------+-----------+---------+----------+
| No          |           |         |          |
+-------------+-----------+---------+----------+
 
 
 
+------------------+---------------+
| Sex Assigned at  | Date Recorded |
| Birth            |               |
+------------------+---------------+
| Not on file      |               |
+------------------+---------------+
 as of this encounter
 
 Progress Notes
 Peabody, Jessica M, RN - 2019  6:22 PM [facility] medical records request was denied. 
Fax sent forward to Fidel Catia (F#[phone]). Fax confirmation received. in this en
counter
 
 Plan of Treatment
 
 
+--------+----------+-----------------+----------------------+-------------+
| Date   | Type     | Specialty       | Care Team            | Description |
+--------+----------+-----------------+----------------------+-------------+
| / | Initial  | General Surgery |   Сергей Medeiros, |             |
|    | consult  |                 |  MD [address]  |             |
|        |          |                 |  [place] [address]  |             |
|        |          |                 |  [phone]        |             |
|        |          |                 | [phone] (Fax)   |             |
+--------+----------+-----------------+----------------------+-------------+
 as of this encounter
 
 Visit Diagnoses
 Not on filein this encounter

## 2019-04-19 NOTE — XMS
Encounter Summary
  Created on: 2019
 
 Cherie Villalobos
 External Reference #: [id number]
 : 48
 Sex: Female
 
 Demographics
 
 
+-----------------------+--------------------------+
| Address               | [address]               |
|                       | [place]  [address] |
+-----------------------+--------------------------+
| Home Phone            | [phone]          |
+-----------------------+--------------------------+
| Preferred Language    | Unknown                  |
+-----------------------+--------------------------+
| Marital Status        |                   |
+-----------------------+--------------------------+
| Worship Affiliation | 1013                     |
+-----------------------+--------------------------+
| Race                  | Unknown                  |
+-----------------------+--------------------------+
| Ethnic Group          | Unknown                  |
+-----------------------+--------------------------+
 
 
 Author
 
 
+--------------+-----------------------+
| Author       | [facility]ry [employer] |
+--------------+-----------------------+
| Organization | [facility] |
+--------------+-----------------------+
| Address      | Unknown               |
+--------------+-----------------------+
| Phone        | Unavailable           |
+--------------+-----------------------+
 
 
 
 Support
 
 
+---------------+--------------+---------------------+-----------------+
| Name          | Relationship | Address             | Phone           |
+---------------+--------------+---------------------+-----------------+
| Anoop Villalobos | ECON         | [place], | [phone] |
|               |              |  [place]  [address]     |                 |
+---------------+--------------+---------------------+-----------------+
| Jennifer Dickson | ECON         | [place]       | [phone] |
|               |              | [phone]               |                 |
+---------------+--------------+---------------------+-----------------+
 
 
 
 
 Care Team Providers
 
 
+-----------------------+------+-----------------+
| Care Team Member Name | Role | Phone           |
+-----------------------+------+-----------------+
| Ivy Couch DO      | PCP  | [phone] |
+-----------------------+------+-----------------+
 
 
 
 Encounter Details
 
 
+--------+-------------+----------------------+---------------------+-------------+
| Date   | Type        | Department           | Care Team           | Description |
+--------+-------------+----------------------+---------------------+-------------+
| / | Orders Only |   [facility] |   Luisa Segovia  |             |
| 2019   |             |  [address]  [address]      | [address]               |             |
|        |             | [address]   |                     |             |
|        |             | [place]         |                     |             |
|        |             | [address]           |                     |             |
|        |             | [phone]         |                     |             |
+--------+-------------+----------------------+---------------------+-------------+
 
 
 
 Social History
 
 
+---------------+------------+-----------+--------+------------------+
| Tobacco Use   | Types      | Packs/Day | Years  | Date             |
|               |            |           | Used   |                  |
+---------------+------------+-----------+--------+------------------+
| Former Smoker | Cigarettes | 1         | 30     | Quit: 2004 |
+---------------+------------+-----------+--------+------------------+
 
 
 
+---------------------+---+---+---+
| Smokeless Tobacco:  |   |   |   |
| Never Used          |   |   |   |
+---------------------+---+---+---+
 
 
 
+------------------------------+
| Comments: quite smoking 2004 |
+------------------------------+
 
 
 
+-------------+-----------+---------+----------+
| Alcohol Use | Drinks/We | oz/Week | Comments |
|             | ek        |         |          |
+-------------+-----------+---------+----------+
| No          |           |         |          |
+-------------+-----------+---------+----------+
 
 
 
 
+------------------+---------------+
| Sex Assigned at  | Date Recorded |
| Birth            |               |
+------------------+---------------+
| Not on file      |               |
+------------------+---------------+
 as of this encounter
 
 Plan of Treatment
 
 
+--------+----------+-----------------+----------------------+-------------+
| Date   | Type     | Specialty       | Care Team            | Description |
+--------+----------+-----------------+----------------------+-------------+
| / | Initial  | General Surgery |   Сергей Medeiros, |             |
| 2019   | consult  |                 |  MD [address]  |             |
|        |          |                 |  [place] [address]  |             |
|        |          |                 |  [phone]        |             |
|        |          |                 | [phone] (Fax)   |             |
+--------+----------+-----------------+----------------------+-------------+
 as of this encounter
 
 Visit Diagnoses
 Not on filein this encounter

## 2019-04-19 NOTE — XMS
Encounter Summary
  Created on: 2019
 
 Cherie Villalobos
 External Reference #: [id number]
 : 48
 Sex: Female
 
 Demographics
 
 
+-----------------------+--------------------------+
| Address               | [address]               |
|                       | [place]  [address] |
+-----------------------+--------------------------+
| Home Phone            | [phone]          |
+-----------------------+--------------------------+
| Preferred Language    | Unknown                  |
+-----------------------+--------------------------+
| Marital Status        |                   |
+-----------------------+--------------------------+
| Mandaen Affiliation | 1013                     |
+-----------------------+--------------------------+
| Race                  | Unknown                  |
+-----------------------+--------------------------+
| Ethnic Group          | Unknown                  |
+-----------------------+--------------------------+
 
 
 Author
 
 
+--------------+-----------------------+
| Author       | Sherry Tipser |
+--------------+-----------------------+
| Organization | [facility] |
+--------------+-----------------------+
| Address      | Unknown               |
+--------------+-----------------------+
| Phone        | Unavailable           |
+--------------+-----------------------+
 
 
 
 Support
 
 
+---------------+--------------+---------------------+-----------------+
| Name          | Relationship | Address             | Phone           |
+---------------+--------------+---------------------+-----------------+
| Anoop Villalobos | ECON         | [place], | [phone] |
|               |              |  [place]  [address]     |                 |
+---------------+--------------+---------------------+-----------------+
| Jennifer Dickson | ECON         | [place]       | [phone] |
|               |              | [id number]               |                 |
+---------------+--------------+---------------------+-----------------+
 
 
 
 
 Care Team Providers
 
 
+-----------------------+------+-----------------+
| Care Team Member Name | Role | Phone           |
+-----------------------+------+-----------------+
| Ivy Couch DO      | PCP  | [phone] |
+-----------------------+------+-----------------+
 
 
 
 Reason for Visit
 
 
+-----------+-------------+
| Reason    | Comments    |
+-----------+-------------+
| Follow-up | appointment |
+-----------+-------------+
 
 
 
 Encounter Details
 
 
+--------+-----------+----------------------+----------------------+---------------+
| Date   | Type      | Department           | Care Team            | Description   |
+--------+-----------+----------------------+----------------------+---------------+
| / | Telephone |   [facility] |   Srinivasan Jordan,  | Follow-up     |
| 2019   |           |  [facility] Ank[facility]  [address]      | DPM  [address], | (appointment) |
|        |           | [address]   |  [address]  [place],  |               |
|        |           | [place], [place]         | [place] [address]             |               |
|        |           | [address]           | [phone]         |               |
|        |           | [phone]         | [phone] (Fax)   |               |
+--------+-----------+----------------------+----------------------+---------------+
 
 
 
 Social History
 
 
+---------------+------------+-----------+--------+------------------+
| Tobacco Use   | Types      | Packs/Day | Years  | Date             |
|               |            |           | Used   |                  |
+---------------+------------+-----------+--------+------------------+
| Former Smoker | Cigarettes | 1         | 30     | Quit: 2004 |
+---------------+------------+-----------+--------+------------------+
 
 
 
+---------------------+---+---+---+
| Smokeless Tobacco:  |   |   |   |
| Never Used          |   |   |   |
+---------------------+---+---+---+
 
 
 
+------------------------------+
| Comments: quite smoking 2004 |
 
+------------------------------+
 
 
 
+-------------+-----------+---------+----------+
| Alcohol Use | Drinks/We | oz/Week | Comments |
|             | ek        |         |          |
+-------------+-----------+---------+----------+
| No          |           |         |          |
+-------------+-----------+---------+----------+
 
 
 
+------------------+---------------+
| Sex Assigned at  | Date Recorded |
| Birth            |               |
+------------------+---------------+
| Not on file      |               |
+------------------+---------------+
 as of this encounter
 
 Plan of Treatment
 
 
+--------+----------+-----------------+----------------------+-------------+
| Date   | Type     | Specialty       | Care Team            | Description |
+--------+----------+-----------------+----------------------+-------------+
| / | Initial  | General Surgery |   Сергей Medeiros, |             |
|    | consult  |                 |  MD [address]  |             |
|        |          |                 |  [place] [address]  |             |
|        |          |                 |  [phone]        |             |
|        |          |                 | [phone] (Fax)   |             |
+--------+----------+-----------------+----------------------+-------------+
 as of this encounter
 
 Visit Diagnoses
 Not on filein this encounter

## 2019-04-20 NOTE — EKG
[facility]
                                    [address]
                                  [place]  [address]
_________________________________________________________________________________________
                                                                 Signed   
 
 
Ventricular-paced rhythm
Abnormal ECG
When compared with ECG of 03-APR-2019 19:31,
Electronic ventricular pacemaker has replaced Sinus rhythm
Vent. rate has decreased BY  32 BPM
Confirmed by LACY TURNER DO ([id number]) on 4/20/2019 1:22:56 PM
 
 
 
 
 
 
 
 
 
 
 
 
 
 
 
 
 
 
 
 
 
 
 
 
 
 
 
 
 
 
 
 
 
 
 
 
 
 
 
    Electronically Signed By: LACY TURNER DO  04/20/19 1323
_________________________________________________________________________________________
PATIENT NAME:     DANILO JO                          
MEDICAL RECORD #: [id number]                     Electrocardiogram             
          ACCT #: [id number]  
DATE OF BIRTH:   [birth year]                                       
PHYSICIAN:   LACY TURNER DO                     REPORT #: [id number]
REPORT IS CONFIDENTIAL AND NOT TO BE RELEASED WITHOUT AUTHORIZATION

## 2019-04-25 ENCOUNTER — HOSPITAL ENCOUNTER (EMERGENCY)
Dept: HOSPITAL 46 - ED | Age: 69
Discharge: HOME | End: 2019-04-25
Payer: MEDICARE

## 2019-04-25 VITALS — HEIGHT: 70 IN | BODY MASS INDEX: 21.62 KG/M2 | WEIGHT: 151 LBS

## 2019-04-25 DIAGNOSIS — Z79.4: ICD-10-CM

## 2019-04-25 DIAGNOSIS — Z79.899: ICD-10-CM

## 2019-04-25 DIAGNOSIS — E11.22: ICD-10-CM

## 2019-04-25 DIAGNOSIS — J44.9: ICD-10-CM

## 2019-04-25 DIAGNOSIS — Z79.82: ICD-10-CM

## 2019-04-25 DIAGNOSIS — I50.9: ICD-10-CM

## 2019-04-25 DIAGNOSIS — Z90.710: ICD-10-CM

## 2019-04-25 DIAGNOSIS — Z88.5: ICD-10-CM

## 2019-04-25 DIAGNOSIS — T82.838A: Primary | ICD-10-CM

## 2019-04-25 DIAGNOSIS — Z88.0: ICD-10-CM

## 2019-04-25 DIAGNOSIS — N18.6: ICD-10-CM

## 2019-04-25 DIAGNOSIS — Z88.8: ICD-10-CM

## 2019-04-25 DIAGNOSIS — Z95.810: ICD-10-CM

## 2019-04-25 DIAGNOSIS — Z87.891: ICD-10-CM

## 2019-04-25 DIAGNOSIS — Z89.422: ICD-10-CM

## 2019-04-25 NOTE — XMS
PreManage Notification: DANILO JO MRN:[id number]
 
Security Information
 
Security Events
No recent Security Events currently on file
 
 
 
CRITERIA MET
------------
- Group Notification
- 6 ED Visits in 6 Months
- [facility] - Has Care Guidelines
- [facility] - 3 Facilities in 90 Days
- PDMP
- [facility] - 2 Visits in 30 Days
 
 
CARE PROVIDERS
-------------------------------------------------------------------------------------
Marco Antonio Martinez     /Care Coordinator     01/04/2019-Current
 
PHONE: [phone]
-------------------------------------------------------------------------------------
Marco Antonio Martinez     Primary Care     01/04/2019-Current
 
PHONE: [phone]
-------------------------------------------------------------------------------------
LEGACY GOOD              Primary Care     [facility]
 
PHONE: Unknown
-------------------------------------------------------------------------------------
MANOLO GONZALEZ     Primary Care     Current
 
PHONE: Unknown
-------------------------------------------------------------------------------------
[facility]     Mental Health Provider     Current
 
PHONE: [phone]
-------------------------------------------------------------------------------------
orsagrario     Case or Care Manager     Current
 
PHONE: Unknown
-------------------------------------------------------------------------------------
Willard Doyle         Case or Care Manager     Current
[facility]
 
PHONE: [phone]
-------------------------------------------------------------------------------------
Jairo Gutierrez MD     Current
 
PHONE: Unknown
-------------------------------------------------------------------------------------
 
Guidelines Source: Myranda Berman
Guidelines Date: 04/19/2019
 
 
Other Information:
    Currently engaged in mental health services with [facility].\T\nbsp; Please 
    contact [facility] with mental health concerns.\T\nbsp; Gilmer/Edwin Gonzalez:
    [phone]\T\nbsp; Catia: [phone].
-------------------------------------------------------------------------
Additional care guidelines exist for the following facilities:    
[facility] ( 03/19/2019 )
 
TISHA VISIT COUNT (12 MO.)
-------------------------------------------------------------------------------------
16 [facility]
 
3 [address]
 
4 ALEJA Braun
-------------------------------------------------------------------------------------
TOTAL 28
-------------------------------------------------------------------------------------
NOTE: Visits indicate total known visits.
 
ED/UCC VISIT TRACKING (12 MO.)
-------------------------------------------------------------------------------------
04/25/2019 13:58
[facility] [place]
 
TYPE: Emergency
 
COMPLAINT:
- PICKLINE ISSUE
-------------------------------------------------------------------------------------
04/19/2019 12:46
[facility]
 
TYPE: Emergency
 
COMPLAINT:
- ALT LOC
 
DIAGNOSES:
- Type 2 diabetes mellitus with diabetic chronic kidney disease
- Allergy status to penicillin
- Long term (current) use of insulin
- Presence of automatic (implantable) cardiac defibrillator
- Long term (current) use of aspirin
- Hyperkalemia
- Syncope and collapse
- Allergy status to narcotic agent status
- Allergy status to other drugs, medicaments and biological substances status
- Heart failure, unspecified
- Chronic kidney disease, unspecified
- Dependence on renal dialysis
- Chronic obstructive pulmonary disease, unspecified
- Acquired absence of both cervix and uterus
- Other long term (current) drug therapy
-------------------------------------------------------------------------------------
04/18/2019 07:38
[facility] [place]
 
 
TYPE: Emergency
 
DIAGNOSES:
- Contusion of left knee, initial encounter
- Unspecified fall, initial encounter
- L KNEE PAIN
-------------------------------------------------------------------------------------
04/09/2019 12:56
[facility]
 
TYPE: Emergency
 
COMPLAINT:
- LEFT ARM WOUND
 
DIAGNOSES:
- Hemorrhage due to vascular prosthetic devices, implants and grafts, initial
encounter
- Long term (current) use of aspirin
- Other long term (current) drug therapy
- Type 2 diabetes mellitus without complications
- Long term (current) use of insulin
- Allergy status to other drugs, medicaments and biological substances status
- Allergy status to narcotic agent status
- Heart failure, unspecified
- Hemorrhage due to vascular prosthetic devices, implants and grafts, initial
encounter
- Chronic obstructive pulmonary disease, unspecified
-------------------------------------------------------------------------------------
04/03/2019 19:27
[facility]
 
TYPE: Emergency
 
COMPLAINT:
- CHEST PAIN
 
DIAGNOSES:
- Chronic kidney disease, unspecified
- Dependence on renal dialysis
- Other specified abnormal findings of blood chemistry
- Long term (current) use of insulin
- Allergy status to narcotic agent status
- Chronic obstructive pulmonary disease, unspecified
- Allergy status to other drugs, medicaments and biological substances status
- Long term (current) use of aspirin
- Chest pain, unspecified
- Type 2 diabetes mellitus without complications
- Other long term (current) drug therapy
- Other chest pain
- Personal history of nicotine dependence
-------------------------------------------------------------------------------------
03/26/2019 11:56
[facility]
 
TYPE: Emergency
 
COMPLAINT:
- CHEST PAIN
 
-------------------------------------------------------------------------------------
03/17/2019 13:25
[facility] [place]
 
TYPE: Emergency
 
DIAGNOSES:
- Contusion of other part of head, initial encounter
- FALL
- Fall on same level, unspecified, initial encounter
- Hyperkalemia
-------------------------------------------------------------------------------------
03/11/2019 15:03
[facility] [place]
 
TYPE: Emergency
 
DIAGNOSES:
- Weakness
- Acute cystitis without hematuria
- weakness
-------------------------------------------------------------------------------------
[facility]2019 [facility]
 
[facility]
 
DIAGNOSES:
- Unspecified fall, initial encounter
- Displaced fracture of distal phalanx of right great toe, initial encounter for
closed fracture
- Cellulitis of right toe
- Other fatigue
- Skin Complaint
- Weakness
- Referral
- Unspecified place in unspecified non-institutional (private) residence as the place
of occurrence of the external cause
-------------------------------------------------------------------------------------
02[facility]13/2019 15:56
[facility]
 
TYPE: Emergency
 
DIAGNOSES:
- Multiple Falls
- Referral
- Circulatory Problem
-------------------------------------------------------------------------------------
02/09/2019 22:15
[facility]
 
TYPE: Emergency
 
DIAGNOSES:
- Other chest pain
- ABD PAIN
-------------------------------------------------------------------------------------
01/23/2019 13:01
[facility]
 
 
TYPE: Emergency
 
DIAGNOSES:
- Foot Pain
-------------------------------------------------------------------------------------
01/16/2019 09:44
[facility]
 
TYPE: Emergency
 
DIAGNOSES:
- Chest Pain
- Elevated blood-pressure reading, without diagnosis of hypertension
- Presence of aortocoronary bypass graft
- Nausea
- Chest pain, unspecified
- Other specified postprocedural states
- Shortness of Breath
-------------------------------------------------------------------------------------
01/16/2019 05:43
[facility] [place]
 
TYPE: Emergency
 
DIAGNOSES:
- CHEST PAIN
- Abnormal levels of other serum enzymes
- Personal history of other diseases of the circulatory system
- Chest pain, unspecified
-------------------------------------------------------------------------------------
01/08/2019 14:45
[facility] [place]
 
TYPE: Emergency
 
DIAGNOSES:
- Hemorrhage due to vascular prosthetic devices, implants and grafts, initial
encounter
- FISTULA BLEEDING
-------------------------------------------------------------------------------------
12/22/2018 17:20
[facility] [place]
 
TYPE: Emergency
 
DIAGNOSES:
- Type 2 diabetes mellitus with hyperglycemia
- Chest pain, unspecified
- CHEST PAIN
-------------------------------------------------------------------------------------
11/27/2018 18:05
[facility]
 
TYPE: Emergency
 
COMPLAINT:
- SOB
-------------------------------------------------------------------------------------
11/27/2018 07:45
 
[facility] [place]
 
TYPE: Emergency
 
DIAGNOSES:
- Unspecified fall, initial encounter
- FALL
- Dependence on renal dialysis
- End stage renal disease
-------------------------------------------------------------------------------------
11/19/2018 14:59
[facility] [place]
 
TYPE: Emergency
 
DIAGNOSES:
- Essential (primary) hypertension
- Type 2 diabetes mellitus with hyperglycemia
- Type 2 diabetes mellitus with unspecified complications
- FALL
- Unspecified fall, initial encounter
- Headache
-------------------------------------------------------------------------------------
11/17/2018 14:57
[facility]
 
TYPE: Emergency
 
COMPLAINT:
- BACK PAIN
- DORSALGIA UNSPECIFIED
- FREQUENCY OF MICTURITION
- PAINFUL MICTURITION UNSPECIFIED
 
DIAGNOSES:
0. Frequency of micturition
1. Urinary tract infection, site not specified
5. Dorsalgia, unspecified
6. Type 2 diabetes mellitus with hyperglycemia
7. Type 2 diabetes mellitus with diabetic chronic kidney disease
8. End stage renal disease
9. Dependence on renal dialysis
10. Shortness of breath
11. Long term (current) use of anticoagulants
12. Long term (current) use of aspirin
13. Other long term (current) drug therapy
14. Personal history of nicotine dependence
15. Presence of cardiac pacemaker
16. Presence of aortocoronary bypass graft
-------------------------------------------------------------------------------------
Plus 8 More Visits
-------------------------------------------------------------------------------------
 
 
INPATIENT VISIT TRACKING (12 MO.)
-------------------------------------------------------------------------------------
04/19/2019 17:43
[facility]
 
TYPE: General Medicine
 
 
DIAGNOSES:
- Illness, unspecified
- Hyperkalemia
- Hypotension, unspecified
- Metabolic encephalopathy
- Peripheral vascular disease, unspecified
- Hyperkalemia
- End stage renal disease
-------------------------------------------------------------------------------------
03/26/2019 11:56
[facility]
 
TYPE: Medical Surgical
 
COMPLAINT:
- ACUTE CP, CHRONIC RENAL FAILURE, ANEMIA
 
DIAGNOSES:
0. Pleural effusion, not elsewhere classified
1. 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
chronic kidney disease, or end stage renal disease
2. End stage renal disease
3. Acute on chronic diastolic (congestive) heart failure
4. Acute kidney failure, unspecified
5. Chronic kidney disease, unspecified
6. Anemia, unspecified
7. Type 2 diabetes mellitus with diabetic chronic kidney disease
8. Atherosclerotic heart disease of native coronary artery without angina pectoris
9. Type 2 diabetes mellitus with diabetic neuropathy, unspecified
10. Unspecified atrial fibrillation
11. Restless legs syndrome
12. Unspecified urinary incontinence
13. Patient's noncompliance with dietary regimen
14. Presence of cardiac pacemaker
15. Presence of aortocoronary bypass graft
16. Dependence on renal dialysis
17. Patient's noncompliance with renal dialysis
18. History of falling
19. Long term (current) use of insulin
-------------------------------------------------------------------------------------
01/23/2019 13:01
[facility]
 
TYPE: Vascular Surgery
 
DIAGNOSES:
- End stage renal disease
- Anemia in chronic kidney disease
- Dependence on renal dialysis
- Other disorders of plasma-protein metabolism, not elsewhere classified
- Other specified personal risk factors, not elsewhere classified
- Peripheral vascular disease, unspecified
- Foot Pain
-------------------------------------------------------------------------------------
12/27/2018 09:45
[facility]     [place]
 
TYPE: Recovery
 
 
DIAGNOSES:
- End stage renal disease
- Peripheral arterial disease, osteomyelitis left foot
- Other acute osteomyelitis, left ankle and foot
- Long term (current) use of antibiotics
- Anemia in chronic kidney disease
-------------------------------------------------------------------------------------
12/05/2018 07:52
[facility]
 
TYPE: General Medicine
 
DIAGNOSES:
- End stage renal disease
- Peripheral vascular disease, unspecified
- Dependence on renal dialysis
- Long term (current) use of insulin
- Other chronic osteomyelitis, left ankle and foot
- Type 2 diabetes mellitus with diabetic chronic kidney disease
- Essential (primary) hypertension
-------------------------------------------------------------------------------------
11/29/2018 11:38
[facility]
 
TYPE: Inpatient
 
DIAGNOSES:
- Upper GIB
- Other specified personal risk factors, not elsewhere classified
- Chronic systolic (congestive) heart failure
- Other disorders of phosphorus metabolism
- Anemia in chronic kidney disease
- Other disorders of plasma-protein metabolism, not elsewhere classified
- Moderate protein-calorie malnutrition
- End stage renal disease
- Other chronic osteomyelitis, unspecified ankle and foot
-------------------------------------------------------------------------------------
11/27/2018 18:05
[facility] [place]
 
TYPE: Medical Surgical
 
COMPLAINT:
- SOB
 
DIAGNOSES:
0. Type 2 diabetes mellitus with foot ulcer
1. Type 2 diabetes mellitus with other specified complication
2. Urinary tract infection, site not specified
3. Unspecified protein-calorie malnutrition
4. Other chronic osteomyelitis, unspecified site
5. 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
chronic kidney disease, or end stage renal disease
6. Chronic diastolic (congestive) heart failure
7. Other osteomyelitis, ankle and foot
8. Type 2 diabetes mellitus with foot ulcer
9. Atherosclerotic heart disease of native coronary artery without angina pectoris
10. Chronic obstructive pulmonary disease, unspecified
11. Other malaise
12. Type 2 diabetes mellitus with hyperglycemia
 
13. Type 2 diabetes mellitus with diabetic chronic kidney disease
14. Type 2 diabetes mellitus with diabetic peripheral angiopathy without gangrene
15. Unspecified atrial fibrillation
16. Type 2 diabetes mellitus with diabetic neuropathy, unspecified
17. End stage renal disease
18. Dependence on renal dialysis
19. Long term (current) use of insulin
20. Allergy status to penicillin
21. Allergy status to other drugs, medicaments and biological substances status
22. Personal history of nicotine dependence
23. Anemia in chronic kidney disease
24. Other disorders of phosphorus metabolism
25. Other disorders of plasma-protein metabolism, not elsewhere classified
26. Gout, unspecified
27. Gastro-esophageal reflux disease without esophagitis
28. Old myocardial infarction
29. Presence of aortocoronary bypass graft
-------------------------------------------------------------------------------------
11/05/2018 14:39
[facility]
 
[facility]
 
COMPLAINT:
- RML PNEUMONIA SYNCOPE COLLAPSE
 
DIAGNOSES:
0. Syncope and collapse
1. Benign paroxysmal vertigo, unspecified ear
2. End stage renal disease
3. Hypertensive chronic kidney disease with stage 5 chronic kidney disease or end
stage renal disease
4. Urinary tract infection, site not specified
5. 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
chronic kidney disease, or end stage renal disease
6. Kidney transplant status
7. Unspecified systolic (congestive) heart failure
8. Syncope and collapse
9. Type 2 diabetes mellitus with diabetic chronic kidney disease
10. Atherosclerotic heart disease of native coronary artery without angina pectoris
11. Nicotine dependence, cigarettes, uncomplicated
12. Anemia, unspecified
13. Other disorders of plasma-protein metabolism, not elsewhere classified
14. Other disorders of phosphorus metabolism
15. Type 2 diabetes mellitus with diabetic polyneuropathy
16. Other specified bacterial agents as the cause of diseases classified elsewhere
17. Paroxysmal atrial fibrillation
18. Presence of aortocoronary bypass graft
19. Long term (current) use of insulin
-------------------------------------------------------------------------------------
[facility]/2018 00:38
[facility]
 
[facility]
 
DIAGNOSES:
- Long term (current) use of insulin
- Dependence on renal dialysis
- End stage renal disease
- Type 2 diabetes mellitus with diabetic chronic kidney disease
 
- Essential (primary) hypertension
- Acute ischemic heart disease, unspecified
- Anemia in chronic kidney disease
-------------------------------------------------------------------------------------
04/27/2018 18:23
[facility]
 
TYPE: Medical Surgical
 
COMPLAINT:
- SOB, UTI, RENAL FAILURE
 
DIAGNOSES:
0. Cough
1. Pneumonia due to Pseudomonas
2. End stage renal disease
3. 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
chronic kidney disease, or end stage renal disease
4. Cachexia
5. Chronic obstructive pulmonary disease with acute lower respiratory infection
6. Type 2 diabetes mellitus with diabetic chronic kidney disease
7. Heart failure, unspecified
8. Hyperlipidemia, unspecified
9. Gastro-esophageal reflux disease without esophagitis
10. Atherosclerotic heart disease of native coronary artery without angina pectoris
11. Major depressive disorder, single episode, unspecified
12. Anxiety disorder, unspecified
13. Type 2 diabetes mellitus with diabetic polyneuropathy
14. Unspecified atrial fibrillation
15. Pneumonia due to Methicillin resistant Staphylococcus aureus
16. Nosocomial condition
17. BACTERIURIA
17. Abnormal coagulation profile
18. Abnormal coagulation profile
18. Adverse effect of anticoagulants, initial encounter
19. Anemia in chronic kidney disease
19. Adverse effect of anticoagulants, initial encounter
20. Anemia in chronic kidney disease
20. Other disorders of plasma-protein metabolism, not elsewhere classified
21. Exposure to other specified factors, initial encounter
21. Other disorders of plasma-protein metabolism, not elsewhere classified
22. Exposure to other specified factors, initial encounter
22. Activity, unspecified
23. Activity, unspecified
23. Other abnormal findings in urine
24. Presence of aortocoronary bypass graft
25. Presence of prosthetic heart valve
26. Long term (current) use of insulin
27. Dependence on renal dialysis
28. Unspecified external cause status
29. Old myocardial infarction
30. Acquired absence of both cervix and uterus
31. Acquired absence of other specified parts of digestive tract
32. Allergy status to narcotic agent status
33. Allergy status to penicillin
34. Allergy status to other drugs, medicaments and biological substances status
35. Long term (current) use of anticoagulants
36. Long term (current) use of aspirin
37. Body mass index (BMI) 23.0-23.9, adult
-------------------------------------------------------------------------------------
 
 
[email or web address]

## 2019-08-14 ENCOUNTER — HOSPITAL ENCOUNTER (OUTPATIENT)
Facility: MEDICAL CENTER | Age: 69
End: 2019-08-17
Attending: EMERGENCY MEDICINE | Admitting: HOSPITALIST
Payer: MEDICARE

## 2019-08-14 DIAGNOSIS — Z95.1 HX OF CABG: ICD-10-CM

## 2019-08-14 DIAGNOSIS — F41.9 ANXIETY: ICD-10-CM

## 2019-08-14 DIAGNOSIS — R79.89 ELEVATED TROPONIN: ICD-10-CM

## 2019-08-14 DIAGNOSIS — R07.89 OTHER CHEST PAIN: ICD-10-CM

## 2019-08-14 PROBLEM — K21.9 GERD (GASTROESOPHAGEAL REFLUX DISEASE): Status: ACTIVE | Noted: 2019-08-14

## 2019-08-14 PROBLEM — R07.9 PAIN IN THE CHEST: Status: ACTIVE | Noted: 2019-08-14

## 2019-08-14 PROBLEM — I10 HTN (HYPERTENSION): Status: ACTIVE | Noted: 2019-08-14

## 2019-08-14 PROBLEM — I73.9 PVD (PERIPHERAL VASCULAR DISEASE) (HCC): Status: ACTIVE | Noted: 2019-08-14

## 2019-08-14 PROBLEM — K52.9 COLITIS: Status: ACTIVE | Noted: 2019-08-14

## 2019-08-14 PROBLEM — D64.9 ANEMIA: Status: ACTIVE | Noted: 2019-08-14

## 2019-08-14 PROBLEM — Z86.79: Status: ACTIVE | Noted: 2019-08-14

## 2019-08-14 PROBLEM — N18.6 ESRD (END STAGE RENAL DISEASE) (HCC): Status: ACTIVE | Noted: 2019-08-14

## 2019-08-14 PROBLEM — E11.9 DM (DIABETES MELLITUS) (HCC): Status: ACTIVE | Noted: 2019-08-14

## 2019-08-14 PROBLEM — I25.10 CAD (CORONARY ARTERY DISEASE): Status: ACTIVE | Noted: 2019-08-14

## 2019-08-14 PROBLEM — Z98.890 H/O MITRAL VALVE REPAIR: Status: ACTIVE | Noted: 2019-08-14

## 2019-08-14 PROBLEM — D69.6 THROMBOCYTOPENIA (HCC): Status: ACTIVE | Noted: 2019-08-14

## 2019-08-14 LAB
ALBUMIN SERPL BCP-MCNC: 4.2 G/DL (ref 3.2–4.9)
ALBUMIN/GLOB SERPL: 1.2 G/DL
ALP SERPL-CCNC: 78 U/L (ref 30–99)
ALT SERPL-CCNC: 12 U/L (ref 2–50)
ANION GAP SERPL CALC-SCNC: 13 MMOL/L (ref 0–11.9)
AST SERPL-CCNC: 18 U/L (ref 12–45)
BASOPHILS # BLD AUTO: 0.2 % (ref 0–1.8)
BASOPHILS # BLD: 0.01 K/UL (ref 0–0.12)
BILIRUB SERPL-MCNC: 0.8 MG/DL (ref 0.1–1.5)
BUN SERPL-MCNC: 29 MG/DL (ref 8–22)
CALCIUM SERPL-MCNC: 9.5 MG/DL (ref 8.5–10.5)
CHLORIDE SERPL-SCNC: 92 MMOL/L (ref 96–112)
CO2 SERPL-SCNC: 33 MMOL/L (ref 20–33)
CREAT SERPL-MCNC: 5.97 MG/DL (ref 0.5–1.4)
CRP SERPL HS-MCNC: 0.32 MG/DL (ref 0–0.75)
EKG IMPRESSION: NORMAL
EOSINOPHIL # BLD AUTO: 0 K/UL (ref 0–0.51)
EOSINOPHIL NFR BLD: 0 % (ref 0–6.9)
ERYTHROCYTE [DISTWIDTH] IN BLOOD BY AUTOMATED COUNT: 55.9 FL (ref 35.9–50)
ERYTHROCYTE [SEDIMENTATION RATE] IN BLOOD BY WESTERGREN METHOD: 13 MM/HOUR (ref 0–30)
GLOBULIN SER CALC-MCNC: 3.5 G/DL (ref 1.9–3.5)
GLUCOSE SERPL-MCNC: 274 MG/DL (ref 65–99)
HCT VFR BLD AUTO: 33.7 % (ref 37–47)
HGB BLD-MCNC: 10.5 G/DL (ref 12–16)
IMM GRANULOCYTES # BLD AUTO: 0.01 K/UL (ref 0–0.11)
IMM GRANULOCYTES NFR BLD AUTO: 0.2 % (ref 0–0.9)
LIPASE SERPL-CCNC: 38 U/L (ref 11–82)
LYMPHOCYTES # BLD AUTO: 0.4 K/UL (ref 1–4.8)
LYMPHOCYTES NFR BLD: 7.2 % (ref 22–41)
MCH RBC QN AUTO: 33.8 PG (ref 27–33)
MCHC RBC AUTO-ENTMCNC: 31.2 G/DL (ref 33.6–35)
MCV RBC AUTO: 108.4 FL (ref 81.4–97.8)
MONOCYTES # BLD AUTO: 0.34 K/UL (ref 0–0.85)
MONOCYTES NFR BLD AUTO: 6.1 % (ref 0–13.4)
NEUTROPHILS # BLD AUTO: 4.78 K/UL (ref 2–7.15)
NEUTROPHILS NFR BLD: 86.3 % (ref 44–72)
NRBC # BLD AUTO: 0 K/UL
NRBC BLD-RTO: 0 /100 WBC
PLATELET # BLD AUTO: 119 K/UL (ref 164–446)
PMV BLD AUTO: 11 FL (ref 9–12.9)
POTASSIUM SERPL-SCNC: 4.1 MMOL/L (ref 3.6–5.5)
PROT SERPL-MCNC: 7.7 G/DL (ref 6–8.2)
RBC # BLD AUTO: 3.11 M/UL (ref 4.2–5.4)
SODIUM SERPL-SCNC: 138 MMOL/L (ref 135–145)
TROPONIN T SERPL-MCNC: 118 NG/L (ref 6–19)
WBC # BLD AUTO: 5.5 K/UL (ref 4.8–10.8)

## 2019-08-14 PROCEDURE — 80053 COMPREHEN METABOLIC PANEL: CPT

## 2019-08-14 PROCEDURE — 85652 RBC SED RATE AUTOMATED: CPT

## 2019-08-14 PROCEDURE — 84484 ASSAY OF TROPONIN QUANT: CPT

## 2019-08-14 PROCEDURE — A9270 NON-COVERED ITEM OR SERVICE: HCPCS | Performed by: HOSPITALIST

## 2019-08-14 PROCEDURE — 700102 HCHG RX REV CODE 250 W/ 637 OVERRIDE(OP): Performed by: HOSPITALIST

## 2019-08-14 PROCEDURE — 99204 OFFICE O/P NEW MOD 45 MIN: CPT | Performed by: INTERNAL MEDICINE

## 2019-08-14 PROCEDURE — 99220 PR INITIAL OBSERVATION CARE,LEVL III: CPT | Performed by: HOSPITALIST

## 2019-08-14 PROCEDURE — G0378 HOSPITAL OBSERVATION PER HR: HCPCS

## 2019-08-14 PROCEDURE — 82962 GLUCOSE BLOOD TEST: CPT

## 2019-08-14 PROCEDURE — 99285 EMERGENCY DEPT VISIT HI MDM: CPT

## 2019-08-14 PROCEDURE — 36415 COLL VENOUS BLD VENIPUNCTURE: CPT

## 2019-08-14 PROCEDURE — 83690 ASSAY OF LIPASE: CPT

## 2019-08-14 PROCEDURE — 93005 ELECTROCARDIOGRAM TRACING: CPT | Performed by: EMERGENCY MEDICINE

## 2019-08-14 PROCEDURE — 93005 ELECTROCARDIOGRAM TRACING: CPT

## 2019-08-14 PROCEDURE — 85025 COMPLETE CBC W/AUTO DIFF WBC: CPT

## 2019-08-14 PROCEDURE — 86140 C-REACTIVE PROTEIN: CPT

## 2019-08-14 PROCEDURE — 99358 PROLONG SERVICE W/O CONTACT: CPT | Performed by: HOSPITALIST

## 2019-08-14 RX ORDER — LORAZEPAM 1 MG/1
1 TABLET ORAL 2 TIMES DAILY
Status: DISCONTINUED | OUTPATIENT
Start: 2019-08-14 | End: 2019-08-17 | Stop reason: HOSPADM

## 2019-08-14 RX ORDER — BISACODYL 10 MG
10 SUPPOSITORY, RECTAL RECTAL
Status: DISCONTINUED | OUTPATIENT
Start: 2019-08-14 | End: 2019-08-17 | Stop reason: HOSPADM

## 2019-08-14 RX ORDER — ALBUTEROL SULFATE 90 UG/1
2 AEROSOL, METERED RESPIRATORY (INHALATION) EVERY 6 HOURS PRN
COMMUNITY

## 2019-08-14 RX ORDER — LORAZEPAM 1 MG/1
1 TABLET ORAL 2 TIMES DAILY
Status: ON HOLD | COMMUNITY
End: 2019-08-17 | Stop reason: SDUPTHER

## 2019-08-14 RX ORDER — ONDANSETRON 4 MG/1
4 TABLET, ORALLY DISINTEGRATING ORAL EVERY 6 HOURS PRN
COMMUNITY

## 2019-08-14 RX ORDER — ASPIRIN 325 MG
325 TABLET ORAL DAILY
Status: DISCONTINUED | OUTPATIENT
Start: 2019-08-15 | End: 2019-08-15

## 2019-08-14 RX ORDER — ONDANSETRON 4 MG/1
4 TABLET, ORALLY DISINTEGRATING ORAL EVERY 4 HOURS PRN
Status: DISCONTINUED | OUTPATIENT
Start: 2019-08-14 | End: 2019-08-14

## 2019-08-14 RX ORDER — TRAMADOL HYDROCHLORIDE 50 MG/1
50 TABLET ORAL EVERY 4 HOURS PRN
COMMUNITY

## 2019-08-14 RX ORDER — AMOXICILLIN 250 MG
1 CAPSULE ORAL
COMMUNITY
End: 2019-08-14

## 2019-08-14 RX ORDER — CARVEDILOL 6.25 MG/1
6.25 TABLET ORAL 2 TIMES DAILY WITH MEALS
Status: DISCONTINUED | OUTPATIENT
Start: 2019-08-14 | End: 2019-08-17 | Stop reason: HOSPADM

## 2019-08-14 RX ORDER — LORAZEPAM 1 MG/1
1 TABLET ORAL
COMMUNITY
End: 2019-08-14

## 2019-08-14 RX ORDER — PROCHLORPERAZINE MALEATE 5 MG/1
5 TABLET ORAL EVERY 6 HOURS PRN
COMMUNITY

## 2019-08-14 RX ORDER — ASPIRIN 300 MG/1
300 SUPPOSITORY RECTAL DAILY
Status: DISCONTINUED | OUTPATIENT
Start: 2019-08-15 | End: 2019-08-15

## 2019-08-14 RX ORDER — ROPINIROLE 0.25 MG/1
0.25 TABLET, FILM COATED ORAL 3 TIMES DAILY
COMMUNITY

## 2019-08-14 RX ORDER — POLYETHYLENE GLYCOL 3350 17 G/17G
1 POWDER, FOR SOLUTION ORAL
Status: DISCONTINUED | OUTPATIENT
Start: 2019-08-14 | End: 2019-08-17 | Stop reason: HOSPADM

## 2019-08-14 RX ORDER — NORTRIPTYLINE HYDROCHLORIDE 25 MG/1
25 CAPSULE ORAL 3 TIMES DAILY
COMMUNITY
End: 2019-08-14

## 2019-08-14 RX ORDER — ASPIRIN 81 MG/1
324 TABLET, CHEWABLE ORAL DAILY
Status: DISCONTINUED | OUTPATIENT
Start: 2019-08-15 | End: 2019-08-15

## 2019-08-14 RX ORDER — ATORVASTATIN CALCIUM 40 MG/1
40 TABLET, FILM COATED ORAL NIGHTLY
COMMUNITY

## 2019-08-14 RX ORDER — ATORVASTATIN CALCIUM 40 MG/1
40 TABLET, FILM COATED ORAL NIGHTLY
Status: DISCONTINUED | OUTPATIENT
Start: 2019-08-14 | End: 2019-08-17 | Stop reason: HOSPADM

## 2019-08-14 RX ORDER — LOVASTATIN 40 MG/1
40 TABLET ORAL NIGHTLY
COMMUNITY
End: 2019-08-14

## 2019-08-14 RX ORDER — LOSARTAN POTASSIUM 100 MG/1
100 TABLET ORAL DAILY
COMMUNITY
End: 2019-08-14

## 2019-08-14 RX ORDER — TRAMADOL HYDROCHLORIDE 50 MG/1
50 TABLET ORAL EVERY 4 HOURS PRN
Status: DISCONTINUED | OUTPATIENT
Start: 2019-08-14 | End: 2019-08-17 | Stop reason: HOSPADM

## 2019-08-14 RX ORDER — CARVEDILOL 6.25 MG/1
6.25 TABLET ORAL 2 TIMES DAILY WITH MEALS
COMMUNITY

## 2019-08-14 RX ORDER — NITROGLYCERIN 0.4 MG/1
0.4 TABLET SUBLINGUAL
COMMUNITY

## 2019-08-14 RX ORDER — METRONIDAZOLE 500 MG/1
500 TABLET ORAL EVERY 8 HOURS
Status: DISCONTINUED | OUTPATIENT
Start: 2019-08-14 | End: 2019-08-17 | Stop reason: HOSPADM

## 2019-08-14 RX ORDER — ROPINIROLE 0.5 MG/1
0.25 TABLET, FILM COATED ORAL 3 TIMES DAILY
Status: DISCONTINUED | OUTPATIENT
Start: 2019-08-15 | End: 2019-08-17 | Stop reason: HOSPADM

## 2019-08-14 RX ORDER — ONDANSETRON 2 MG/ML
4 INJECTION INTRAMUSCULAR; INTRAVENOUS EVERY 4 HOURS PRN
Status: DISCONTINUED | OUTPATIENT
Start: 2019-08-14 | End: 2019-08-14

## 2019-08-14 RX ORDER — SPIRONOLACTONE 25 MG/1
25 TABLET ORAL 2 TIMES DAILY
COMMUNITY
End: 2019-08-14

## 2019-08-14 RX ORDER — AMOXICILLIN 250 MG
2 CAPSULE ORAL 2 TIMES DAILY
Status: DISCONTINUED | OUTPATIENT
Start: 2019-08-14 | End: 2019-08-17 | Stop reason: HOSPADM

## 2019-08-14 RX ORDER — CIPROFLOXACIN 500 MG/1
500 TABLET, FILM COATED ORAL
Status: DISCONTINUED | OUTPATIENT
Start: 2019-08-14 | End: 2019-08-15

## 2019-08-14 RX ADMIN — CIPROFLOXACIN HYDROCHLORIDE 500 MG: 500 TABLET, FILM COATED ORAL at 23:20

## 2019-08-14 RX ADMIN — CARVEDILOL 6.25 MG: 6.25 TABLET, FILM COATED ORAL at 23:20

## 2019-08-14 RX ADMIN — METRONIDAZOLE 500 MG: 500 TABLET, FILM COATED ORAL at 23:20

## 2019-08-14 RX ADMIN — LORAZEPAM 1 MG: 1 TABLET ORAL at 21:38

## 2019-08-14 RX ADMIN — ATORVASTATIN CALCIUM 40 MG: 40 TABLET, FILM COATED ORAL at 23:20

## 2019-08-14 SDOH — HEALTH STABILITY: MENTAL HEALTH: HOW OFTEN DO YOU HAVE A DRINK CONTAINING ALCOHOL?: NEVER

## 2019-08-14 ASSESSMENT — ENCOUNTER SYMPTOMS
HEARTBURN: 0
DEPRESSION: 0
SHORTNESS OF BREATH: 1
FEVER: 0
PALPITATIONS: 0
ARTHRALGIAS: 0
DOUBLE VISION: 0
CHILLS: 0
BACK PAIN: 0
CHEST TIGHTNESS: 1
AGITATION: 0
BRUISES/BLEEDS EASILY: 0
ABDOMINAL PAIN: 0
COLOR CHANGE: 0
COUGH: 0
STRIDOR: 0
NERVOUS/ANXIOUS: 0
BLURRED VISION: 0
SENSORY CHANGE: 0
MYALGIAS: 0
VOMITING: 0
UNEXPECTED WEIGHT CHANGE: 0
EYE ITCHING: 0
WEAKNESS: 0
NAUSEA: 0
APNEA: 0
DIZZINESS: 0
EYE DISCHARGE: 0
WHEEZING: 0
DIAPHORESIS: 0
ABDOMINAL DISTENTION: 0
HEMOPTYSIS: 0
FOCAL WEAKNESS: 0
ADENOPATHY: 0
SPEECH CHANGE: 0
HALLUCINATIONS: 0
FLANK PAIN: 0

## 2019-08-14 ASSESSMENT — LIFESTYLE VARIABLES
SUBSTANCE_ABUSE: 0
EVER_SMOKED: YES

## 2019-08-14 ASSESSMENT — PAIN DESCRIPTION - DESCRIPTORS: DESCRIPTORS: SHARP

## 2019-08-15 ENCOUNTER — HOSPITAL ENCOUNTER (OUTPATIENT)
Dept: RADIOLOGY | Facility: MEDICAL CENTER | Age: 69
End: 2019-08-15

## 2019-08-15 LAB
ALBUMIN SERPL BCP-MCNC: 3.5 G/DL (ref 3.2–4.9)
ALBUMIN/GLOB SERPL: 1.3 G/DL
ALP SERPL-CCNC: 68 U/L (ref 30–99)
ALT SERPL-CCNC: 9 U/L (ref 2–50)
ANION GAP SERPL CALC-SCNC: 12 MMOL/L (ref 0–11.9)
AST SERPL-CCNC: 18 U/L (ref 12–45)
BASOPHILS # BLD AUTO: 0 % (ref 0–1.8)
BASOPHILS # BLD: 0 K/UL (ref 0–0.12)
BILIRUB SERPL-MCNC: 0.7 MG/DL (ref 0.1–1.5)
BUN SERPL-MCNC: 34 MG/DL (ref 8–22)
CALCIUM SERPL-MCNC: 9 MG/DL (ref 8.5–10.5)
CHLORIDE SERPL-SCNC: 95 MMOL/L (ref 96–112)
CO2 SERPL-SCNC: 31 MMOL/L (ref 20–33)
CREAT SERPL-MCNC: 6.67 MG/DL (ref 0.5–1.4)
EKG IMPRESSION: NORMAL
EOSINOPHIL # BLD AUTO: 0 K/UL (ref 0–0.51)
EOSINOPHIL NFR BLD: 0 % (ref 0–6.9)
ERYTHROCYTE [DISTWIDTH] IN BLOOD BY AUTOMATED COUNT: 55.7 FL (ref 35.9–50)
GLOBULIN SER CALC-MCNC: 2.7 G/DL (ref 1.9–3.5)
GLUCOSE BLD-MCNC: 137 MG/DL (ref 65–99)
GLUCOSE BLD-MCNC: 179 MG/DL (ref 65–99)
GLUCOSE BLD-MCNC: 199 MG/DL (ref 65–99)
GLUCOSE BLD-MCNC: 209 MG/DL (ref 65–99)
GLUCOSE SERPL-MCNC: 265 MG/DL (ref 65–99)
HCT VFR BLD AUTO: 31.3 % (ref 37–47)
HGB BLD-MCNC: 10 G/DL (ref 12–16)
IMM GRANULOCYTES # BLD AUTO: 0.03 K/UL (ref 0–0.11)
IMM GRANULOCYTES NFR BLD AUTO: 0.6 % (ref 0–0.9)
LYMPHOCYTES # BLD AUTO: 0.46 K/UL (ref 1–4.8)
LYMPHOCYTES NFR BLD: 9.1 % (ref 22–41)
MCH RBC QN AUTO: 34.5 PG (ref 27–33)
MCHC RBC AUTO-ENTMCNC: 31.9 G/DL (ref 33.6–35)
MCV RBC AUTO: 107.9 FL (ref 81.4–97.8)
MONOCYTES # BLD AUTO: 0.41 K/UL (ref 0–0.85)
MONOCYTES NFR BLD AUTO: 8.1 % (ref 0–13.4)
NEUTROPHILS # BLD AUTO: 4.18 K/UL (ref 2–7.15)
NEUTROPHILS NFR BLD: 82.2 % (ref 44–72)
NRBC # BLD AUTO: 0 K/UL
NRBC BLD-RTO: 0 /100 WBC
PLATELET # BLD AUTO: 103 K/UL (ref 164–446)
PMV BLD AUTO: 10.6 FL (ref 9–12.9)
POTASSIUM SERPL-SCNC: 4.1 MMOL/L (ref 3.6–5.5)
PROT SERPL-MCNC: 6.2 G/DL (ref 6–8.2)
RBC # BLD AUTO: 2.9 M/UL (ref 4.2–5.4)
SODIUM SERPL-SCNC: 138 MMOL/L (ref 135–145)
TROPONIN T SERPL-MCNC: 301 NG/L (ref 6–19)
TROPONIN T SERPL-MCNC: 308 NG/L (ref 6–19)
WBC # BLD AUTO: 5.1 K/UL (ref 4.8–10.8)

## 2019-08-15 PROCEDURE — 93005 ELECTROCARDIOGRAM TRACING: CPT | Performed by: HOSPITALIST

## 2019-08-15 PROCEDURE — 82962 GLUCOSE BLOOD TEST: CPT | Mod: 91

## 2019-08-15 PROCEDURE — 96372 THER/PROPH/DIAG INJ SC/IM: CPT

## 2019-08-15 PROCEDURE — 99226 PR SUBSEQUENT OBSERVATION CARE,LEVEL III: CPT | Performed by: HOSPITALIST

## 2019-08-15 PROCEDURE — G0378 HOSPITAL OBSERVATION PER HR: HCPCS

## 2019-08-15 PROCEDURE — 700102 HCHG RX REV CODE 250 W/ 637 OVERRIDE(OP): Performed by: HOSPITALIST

## 2019-08-15 PROCEDURE — 84484 ASSAY OF TROPONIN QUANT: CPT

## 2019-08-15 PROCEDURE — A9270 NON-COVERED ITEM OR SERVICE: HCPCS | Performed by: HOSPITALIST

## 2019-08-15 PROCEDURE — 93010 ELECTROCARDIOGRAM REPORT: CPT | Performed by: INTERNAL MEDICINE

## 2019-08-15 PROCEDURE — 80053 COMPREHEN METABOLIC PANEL: CPT

## 2019-08-15 PROCEDURE — 36415 COLL VENOUS BLD VENIPUNCTURE: CPT

## 2019-08-15 PROCEDURE — 85025 COMPLETE CBC W/AUTO DIFF WBC: CPT

## 2019-08-15 RX ORDER — LORAZEPAM 1 MG/1
1 TABLET ORAL
Status: DISCONTINUED | OUTPATIENT
Start: 2019-08-15 | End: 2019-08-17 | Stop reason: HOSPADM

## 2019-08-15 RX ADMIN — INSULIN HUMAN 1 UNITS: 100 INJECTION, SOLUTION PARENTERAL at 08:02

## 2019-08-15 RX ADMIN — TRAMADOL HYDROCHLORIDE 50 MG: 50 TABLET, FILM COATED ORAL at 13:03

## 2019-08-15 RX ADMIN — ATORVASTATIN CALCIUM 40 MG: 40 TABLET, FILM COATED ORAL at 20:44

## 2019-08-15 RX ADMIN — LORAZEPAM 1 MG: 1 TABLET ORAL at 07:47

## 2019-08-15 RX ADMIN — INSULIN HUMAN 2 UNITS: 100 INJECTION, SOLUTION PARENTERAL at 13:04

## 2019-08-15 RX ADMIN — TRAMADOL HYDROCHLORIDE 50 MG: 50 TABLET, FILM COATED ORAL at 20:16

## 2019-08-15 RX ADMIN — LORAZEPAM 1 MG: 1 TABLET ORAL at 20:16

## 2019-08-15 RX ADMIN — METRONIDAZOLE 500 MG: 500 TABLET, FILM COATED ORAL at 20:16

## 2019-08-15 RX ADMIN — CARVEDILOL 6.25 MG: 6.25 TABLET, FILM COATED ORAL at 08:29

## 2019-08-15 RX ADMIN — CARVEDILOL 6.25 MG: 6.25 TABLET, FILM COATED ORAL at 16:55

## 2019-08-15 RX ADMIN — ROPINIROLE HYDROCHLORIDE 0.25 MG: 0.5 TABLET, FILM COATED ORAL at 16:56

## 2019-08-15 RX ADMIN — METRONIDAZOLE 500 MG: 500 TABLET, FILM COATED ORAL at 13:04

## 2019-08-15 RX ADMIN — METRONIDAZOLE 500 MG: 500 TABLET, FILM COATED ORAL at 07:47

## 2019-08-15 RX ADMIN — ROPINIROLE HYDROCHLORIDE 0.25 MG: 0.5 TABLET, FILM COATED ORAL at 07:46

## 2019-08-15 RX ADMIN — ASPIRIN 325 MG: 325 TABLET, FILM COATED ORAL at 07:47

## 2019-08-15 RX ADMIN — ROPINIROLE HYDROCHLORIDE 0.25 MG: 0.5 TABLET, FILM COATED ORAL at 13:03

## 2019-08-15 RX ADMIN — LORAZEPAM 1 MG: 1 TABLET ORAL at 15:05

## 2019-08-15 ASSESSMENT — COGNITIVE AND FUNCTIONAL STATUS - GENERAL
MOBILITY SCORE: 24
DAILY ACTIVITIY SCORE: 24
SUGGESTED CMS G CODE MODIFIER DAILY ACTIVITY: CH
SUGGESTED CMS G CODE MODIFIER MOBILITY: CH

## 2019-08-15 ASSESSMENT — LIFESTYLE VARIABLES
TOTAL SCORE: 0
ALCOHOL_USE: NO
ON A TYPICAL DAY WHEN YOU DRINK ALCOHOL HOW MANY DRINKS DO YOU HAVE: 0
ALCOHOL_USE: NO
EVER HAD A DRINK FIRST THING IN THE MORNING TO STEADY YOUR NERVES TO GET RID OF A HANGOVER: NO
CONSUMPTION TOTAL: NEGATIVE
HOW MANY TIMES IN THE PAST YEAR HAVE YOU HAD 5 OR MORE DRINKS IN A DAY: 0
HAVE YOU EVER FELT YOU SHOULD CUT DOWN ON YOUR DRINKING: NO
TOTAL SCORE: 0
HAVE PEOPLE ANNOYED YOU BY CRITICIZING YOUR DRINKING: NO
EVER FELT BAD OR GUILTY ABOUT YOUR DRINKING: NO
EVER_SMOKED: YES
TOTAL SCORE: 0
AVERAGE NUMBER OF DAYS PER WEEK YOU HAVE A DRINK CONTAINING ALCOHOL: 0
EVER_SMOKED: YES

## 2019-08-15 ASSESSMENT — PATIENT HEALTH QUESTIONNAIRE - PHQ9
SUM OF ALL RESPONSES TO PHQ QUESTIONS 1-9: 4
4. FEELING TIRED OR HAVING LITTLE ENERGY: NOT AT ALL
9. THOUGHTS THAT YOU WOULD BE BETTER OFF DEAD, OR OF HURTING YOURSELF: NOT AT ALL
6. FEELING BAD ABOUT YOURSELF - OR THAT YOU ARE A FAILURE OR HAVE LET YOURSELF OR YOUR FAMILY DOWN: NOT AL ALL
1. LITTLE INTEREST OR PLEASURE IN DOING THINGS: NOT AT ALL
7. TROUBLE CONCENTRATING ON THINGS, SUCH AS READING THE NEWSPAPER OR WATCHING TELEVISION: SEVERAL DAYS
3. TROUBLE FALLING OR STAYING ASLEEP OR SLEEPING TOO MUCH: NOT AT ALL
5. POOR APPETITE OR OVEREATING: MORE THAN HALF THE DAYS
8. MOVING OR SPEAKING SO SLOWLY THAT OTHER PEOPLE COULD HAVE NOTICED. OR THE OPPOSITE, BEING SO FIGETY OR RESTLESS THAT YOU HAVE BEEN MOVING AROUND A LOT MORE THAN USUAL: NOT AT ALL
SUM OF ALL RESPONSES TO PHQ9 QUESTIONS 1 AND 2: 1
2. FEELING DOWN, DEPRESSED, IRRITABLE, OR HOPELESS: SEVERAL DAYS

## 2019-08-15 ASSESSMENT — ENCOUNTER SYMPTOMS
NERVOUS/ANXIOUS: 1
NAUSEA: 0
CHILLS: 0
SHORTNESS OF BREATH: 1
ABDOMINAL PAIN: 0
LOSS OF CONSCIOUSNESS: 0
FEVER: 0
COUGH: 0
PALPITATIONS: 0
BLOOD IN STOOL: 0
WHEEZING: 0
HEADACHES: 0
FALLS: 0
VOMITING: 0
DIZZINESS: 0

## 2019-08-15 NOTE — ED NOTES
"Pt requesting \"Fentanyl\" for pain.  RN educated patient on opioid epidemic and narcotic use.  RN provides nonpharmalogical interventions for pain.   "

## 2019-08-15 NOTE — ASSESSMENT & PLAN NOTE
In the setting of ESRD and known CAD s/p CABG. Presents to the [facility] for chest pain but has h/o leaving AMA. On day of admission she was undergoing HD when she developed chest pain and came in for further evaluation.   - cardiology following, appreciate recs  - holding eliquis until clearly no invasive intervention to be done  - NM perfusion pending  - continue ASA  - telemetry monitoring

## 2019-08-15 NOTE — H&P
Hospital Medicine History & Physical Note    Date of Service  8/14/2019    Primary Care Physician  Pcp Pt States None    Consultants  none    Code Status  full    Chief Complaint  Chest pain     History of Presenting Illness  69 y.o. female who presented 8/14/2019 with past medical history of coronary artery disease, hypertension, peripheral vascular disease, history of CABG, history of mitral valve repair on Eliquis, end-stage renal disease who presents with chest pain.  This patient apparently was seen at [facility] ER for chest pain multiple times but left AMA.  During dialysis today she started developing some sternal chest pain.  Given some nitro and Nitropaste with improvement of her symptoms.  At the time my examination she denies any active chest pain.  She does have some mild shortness of breath.  She did have some nausea and vomiting earlier today.  She does have a mildly elevated troponin will be admitted to the hospital for ACS rule out.      Upon review of outside hospital records this patient was seen in [facility] August 12, 2019 at that time she presented with rectal bleeding she had urinalysis with 2+ bacteria leukocyte Estrace +1+ blood protein 2+ positive she had a WBC count 5.7 occult blood stool was negative hemoglobin 12.1 platelet count 134 glucose 183 BUN 45 creatinine 8.45 sodium 139 potassium 4.4 chloride 102 CO2 27 anion gap 10 calcium 9.4 albumin 3.4 LFTs unremarkable CT of the abdomen at that time no large abdominal masses are identifiable on this noncontrast exam.  Patient has elevated creatinine and large number of presumed renal cyst are present bilaterally.  There is some colonic wall thickening involving the descending and sigmoid portions of the colon suggesting diffuse colitis.  As was previously recommended colonoscopy is again recommended if the patient is clinically able.  Reportedly a colon mass was noted on a prior exam which is not available to us at  this possibility cannot be excluded on the basis of this exam.  3 additional incidental chronic findings as detailed including severe atherosclerosis.    She then re-presented to the outside hospital today with chest pain.  That time she had WBC count 4.8 hemoglobin 9.9 platelet count 100 x-ray chest little change when compared with yesterday's exam.  Continued follow-up recommended.    Review of Systems  Review of Systems   Constitutional: Positive for malaise/fatigue. Negative for chills and fever.   HENT: Negative for congestion, hearing loss and tinnitus.    Eyes: Negative for blurred vision, double vision and discharge.   Respiratory: Positive for shortness of breath. Negative for cough and hemoptysis.    Cardiovascular: Positive for chest pain. Negative for palpitations and leg swelling.   Gastrointestinal: Negative for abdominal pain, heartburn, nausea and vomiting.   Genitourinary: Negative for dysuria and flank pain.   Musculoskeletal: Negative for joint pain and myalgias.   Skin: Negative for rash.   Neurological: Negative for dizziness, sensory change, speech change, focal weakness and weakness.   Endo/Heme/Allergies: Negative for environmental allergies. Does not bruise/bleed easily.   Psychiatric/Behavioral: Negative for depression, hallucinations and substance abuse.       Past Medical History   has a past medical history of Diabetes ([facility]) and Dialysis patient ([facility]).    Surgical History  Reviewed and not pertinent     Family History  Reviewed and not pertinent     Social History   reports that she has never smoked. She does not have any smokeless tobacco history on file. She reports that she does not drink alcohol or use drugs.    Allergies  Allergies   Allergen Reactions   • Digoxin    • Morphine    • Pcn [Penicillins]    • Sudafed        Medications  Prior to Admission Medications   Prescriptions Last Dose Informant Patient Reported? Taking?   ROPINIRole (REQUIP) 0.25 MG Tab unknown at Unknown time   Yes Yes   Sig: Take 0.25 mg by mouth 3 times a day.   Vortioxetine HBr (TRINTELLIX) 10 MG Tab unknown at Unknown time  Yes Yes   Sig: Take 10 mg by mouth every day.   Vortioxetine HBr 20 MG Tab unknown at Unknown time  Yes Yes   Sig: Take 20 mg by mouth every day.   albuterol 108 (90 Base) MCG/ACT Aero Soln inhalation aerosol unknown at Unknown time  Yes Yes   Sig: Inhale 2 Puffs by mouth every 6 hours as needed for Shortness of Breath.   cholecalciferol (VITAMIN D3) 400 UNIT Tab unknown at Unknown time  Yes Yes   Sig: Take 400 Units by mouth every day.   losartan (COZAAR) 100 MG Tab unknown at Unknown time  Yes Yes   Sig: Take 100 mg by mouth every day.   lovastatin (MEVACOR) 40 MG tablet unknown at Unknown time  Yes Yes   Sig: Take 40 mg by mouth every evening.   nitroglycerin (NITROSTAT) 0.4 MG SL Tab unknown at Unknown time  Yes Yes   Sig: Place 0.4 mg under tongue every 5 minutes as needed for Chest Pain.   nortriptyline (PAMELOR) 25 MG Cap unknown at Unknown time  Yes Yes   Sig: Take 25 mg by mouth 3 times a day.   senna-docusate (KATELYNN-COLACE) 8.6-50 MG Tab unknown at Unknown time  Yes Yes   Sig: Take 1 Tab by mouth every bedtime.   spironolactone (ALDACTONE) 25 MG Tab unknown at Unknown time  Yes Yes   Sig: Take 25 mg by mouth 2 Times a Day.   tramadol (ULTRAM) 50 MG Tab unknown at Unknown time  Yes Yes   Sig: Take 50 mg by mouth every four hours as needed.      Facility-Administered Medications: None       Physical Exam  Temp:  [37.1 °C (98.8 °F)] 37.1 °C (98.8 °F)    Physical Exam   Constitutional: She is oriented to person, place, and time. She appears well-developed and well-nourished. She appears distressed.   HENT:   Head: Normocephalic and atraumatic.   Eyes: Pupils are equal, round, and reactive to light. Conjunctivae and EOM are normal.   Neck: Normal range of motion. Neck supple. No JVD present.   Cardiovascular: Normal rate, regular rhythm, normal heart sounds and intact distal pulses.   No murmur  heard.  Pulmonary/Chest: Effort normal and breath sounds normal. No respiratory distress. She has no wheezes.   Abdominal: Soft. Bowel sounds are normal. She exhibits no distension. There is no tenderness.   Musculoskeletal: Normal range of motion. She exhibits no edema.   Neurological: She is alert and oriented to person, place, and time. She exhibits normal muscle tone.   Skin: Skin is warm and dry.   Psychiatric: She has a normal mood and affect. Her behavior is normal. Judgment and thought content normal.   Nursing note and vitals reviewed.      Laboratory:       Recent Labs     08/14/19 2015   WBC 5.5   RBC 3.11*   HEMOGLOBIN 10.5*   HEMATOCRIT 33.7*   .4*   MCH 33.8*   RDW 55.9*   PLATELETCT 119*   MPV 11.0   NEUTSPOLYS 86.30*   LYMPHOCYTES 7.20*   MONOCYTES 6.10   EOSINOPHILS 0.00   BASOPHILS 0.20     No results for input(s): SODIUM, POTASSIUM, CHLORIDE, CO2, GLUCOSE, BUN, CPKTOTAL in the last 72 hours.        No results for input(s): ALTSGPT, ASTSGOT, ALKPHOSPHAT, TBILIRUBIN, DBILIRUBIN, GAMMAGT, AMYLASE, LIPASE, ALB, PREALBUMIN, GLUCOSE in the last 72 hours.      No results for input(s): NTPROBNP in the last 72 hours.      No results for input(s): TROPONINT in the last 72 hours.    Urinalysis:    No results found     Imaging:  No orders to display         Assessment/Plan:  I anticipate this patient is appropriate for observation status at this time.    * Pain in the chest  Assessment & Plan  Chest pain with elevated troponin in setting of ESRD and known CAD   Serial troponin and ekg   Asa  On BB, statin therapy and eliquis   Cardiology has been consulted for evaluation   Cardiac monitoring     Colitis  Assessment & Plan  This was diagnosed on CT scan at OSH   Will cont on metronidazole and cipro   Esr normal doubt ulcerative colitis   regarless check stool wbc, and cultures  Will need follow up for colonscopy!    Anemia  Assessment & Plan  Mild, cont to montior   No evidence of active bleeding    Occult blood at OSH negative    Thrombocytopenia ([facility])  Assessment & Plan  Mild, cont to montior     H/O mitral valve repair  Assessment & Plan  Hx of     Hx of CABG  Assessment & Plan  Hx of     PVD (peripheral vascular disease) ([facility])  Assessment & Plan  Hx of, resume eliquis and statin therapy     GERD (gastroesophageal reflux disease)  Assessment & Plan  Controlled, cont to montior     HTN (hypertension)  Assessment & Plan  Resume home anti hypertensive medications     CAD (coronary artery disease)  Assessment & Plan  Resume home BB, asa    DM (diabetes mellitus) ([facility])  Assessment & Plan  SSI ordered  accu checks     ESRD (end stage renal disease) ([facility])  Assessment & Plan  Nephrology consultation in the am       VTE prophylaxis: eliquis    I spent a total of 35 minutes of non face to face time performing additional research, reviewing old medical records, provided on going management by following up on labs, placing orders, discussing plan of care with other healthcare providers and nursing staff. Start time: 8:00 pm. End time: 8:35 pm

## 2019-08-15 NOTE — CONSULTS
Cardiology Initial Consultation    Date of Service  8/14/2019    Referring Physician  Forrest Ramesh MD    Reason for Consultation  Abnormal troponin    History of Presenting Illness  Corie Smith is a 69 y.o. female with a past medical history of CABG/mitral valve repair in [place] details unknown, she is also on Eliquis she says for her mitral valve repair, has never heard of atrial fibrillation -and is getting every other day dialysis for end-stage renal disease via fistula in her left upper arm who presented 8/14/2019 by ambulance from [place] emergency room with complaints of chest pain and an abnormal troponin    We are not sure what her outside labs were but here her troponin is elevated  She is resting peacefully lying flat in bed said she had chest pain today.  She has been under a lot of stress.  She is not finding that housing or medical accommodation she needs in [place] and is planning to move back to [place].  She thinks this is what caused her chest pains.    Getting dialysis when she again endorsed chest pain.  This happens often she usually refuses to come to this hospital and has never been here.  Wishes she was not here today feels fine now.  Admits to some nausea with her chest pain not sure which came first had Nitropaste put on there which helped her and she has felt well ever since    Review of Systems  Review of Systems   Constitutional: Negative for chills, diaphoresis, fever and unexpected weight change.   HENT: Negative for congestion, drooling and ear discharge.    Eyes: Negative for discharge and itching.   Respiratory: Positive for chest tightness. Negative for apnea, cough, wheezing and stridor.    Cardiovascular: Positive for chest pain. Negative for palpitations and leg swelling.   Gastrointestinal: Negative for abdominal distention, abdominal pain, nausea and vomiting.   Endocrine: Negative for cold intolerance and heat intolerance.   Genitourinary: Negative for  difficulty urinating and dyspareunia.   Musculoskeletal: Negative for arthralgias and back pain.   Skin: Negative for color change and pallor.   Allergic/Immunologic: Negative for environmental allergies and food allergies.   Neurological: Negative for dizziness and weakness.   Hematological: Negative for adenopathy. Does not bruise/bleed easily.   Psychiatric/Behavioral: Negative for agitation and self-injury. The patient is not nervous/anxious.    All other systems reviewed and are negative.      Past Medical History   has a past medical history of Diabetes ([facility]) and Dialysis patient ([facility]).    Surgical History  None acutely or related to her current admission, reviewed by me -discussed otherwise above    Family History  No significant family history of premature coronary disease    Social History   reports that she has never smoked. She does not have any smokeless tobacco history on file. She reports that she does not drink alcohol or use drugs.    Medications  Prior to Admission Medications   Prescriptions Last Dose Informant Patient Reported? Taking?   LORazepam (ATIVAN) 1 MG Tab unknown at Unknown time  Yes Yes   Sig: Take 1 mg by mouth 2 Times a Day.   ROPINIRole (REQUIP) 0.25 MG Tab unknown at Unknown time  Yes Yes   Sig: Take 0.25 mg by mouth 3 times a day.   Sacubitril-Valsartan (ENTRESTO PO) unknown at Unknown time  Yes Yes   Sig: Take  by mouth.   Vortioxetine HBr (TRINTELLIX) 10 MG Tab unknown at Unknown time  Yes Yes   Sig: Take 10 mg by mouth every day.   albuterol 108 (90 Base) MCG/ACT Aero Soln inhalation aerosol unknown at Unknown time  Yes Yes   Sig: Inhale 2 Puffs by mouth every 6 hours as needed for Shortness of Breath.   apixaban (ELIQUIS) 2.5mg Tab unknown at Unknown time  Yes Yes   Sig: Take 2.5 mg by mouth 2 Times a Day.   atorvastatin (LIPITOR) 40 MG Tab unknown at Unknown time  Yes Yes   Sig: Take 40 mg by mouth every evening.   calcium acetate (PHOS-LO) 667 MG Cap unknown at Unknown time   Yes Yes   Sig: Take 1,334 mg by mouth 3 times a day, with meals.   carvedilol (COREG) 6.25 MG Tab unknwon at Unknown time  Yes Yes   Sig: Take 6.25 mg by mouth 2 times a day, with meals.   cholecalciferol (VITAMIN D3) 400 UNIT Tab unknown at Unknown time  Yes Yes   Sig: Take 400 Units by mouth every 48 hours.   nitroglycerin (NITROSTAT) 0.4 MG SL Tab unknown at Unknown time  Yes Yes   Sig: Place 0.4 mg under tongue every 5 minutes as needed for Chest Pain.   ondansetron (ZOFRAN ODT) 4 MG TABLET DISPERSIBLE unknown at Unknown time  Yes Yes   Sig: Take 4 mg by mouth every 6 hours as needed for Nausea.   prochlorperazine (COMPAZINE) 5 MG Tab unknown at Unknown time  Yes Yes   Sig: Take 5 mg by mouth every 6 hours as needed for Nausea/Vomiting.   tramadol (ULTRAM) 50 MG Tab unknown at Unknown time  Yes Yes   Sig: Take 50 mg by mouth every four hours as needed.      Facility-Administered Medications: None       Allergies  Allergies   Allergen Reactions   • Digoxin    • Morphine    • Pcn [Penicillins]    • Sudafed        Vital signs in last 24 hours  Temp:  [37.1 °C (98.8 °F)] 37.1 °C (98.8 °F)  Pulse:  [71-77] 71  Resp:  [15-18] 15  BP: (133-138)/(45-53) 133/50  SpO2:  [94 %-97 %] 96 %    Physical Exam  Physical Exam   Constitutional: She is oriented to person, place, and time. No distress.   Chronically ill-appearing cachectic wearing diapers comfortable lying flat talkative poor dentition   HENT:   Head: Normocephalic and atraumatic.   Eyes: Pupils are equal, round, and reactive to light. Conjunctivae and EOM are normal.   Neck: Neck supple. No JVD present.   Cardiovascular: Normal rate and intact distal pulses.   No murmur heard.  Pulmonary/Chest: Breath sounds normal. No respiratory distress. She exhibits no tenderness.   Well-healed sternal scar, defibrillator pacemaker in left chest   Abdominal: Soft. Bowel sounds are normal. She exhibits no distension.   Musculoskeletal: Normal range of motion. She exhibits no  edema or tenderness.   Neurological: She is alert and oriented to person, place, and time. No cranial nerve deficit.   Skin: Skin is warm and dry.   Psychiatric: She has a normal mood and affect. Her behavior is normal.       Lab Review  Lab Results   Component Value Date/Time    WBC 5.5 08/14/2019 08:15 PM    RBC 3.11 (L) 08/14/2019 08:15 PM    HEMOGLOBIN 10.5 (L) 08/14/2019 08:15 PM    HEMATOCRIT 33.7 (L) 08/14/2019 08:15 PM    .4 (H) 08/14/2019 08:15 PM    MCH 33.8 (H) 08/14/2019 08:15 PM    MCHC 31.2 (L) 08/14/2019 08:15 PM    MPV 11.0 08/14/2019 08:15 PM      Lab Results   Component Value Date/Time    SODIUM 138 08/14/2019 08:15 PM    POTASSIUM 4.1 08/14/2019 08:15 PM    CHLORIDE 92 (L) 08/14/2019 08:15 PM    CO2 33 08/14/2019 08:15 PM    GLUCOSE 274 (H) 08/14/2019 08:15 PM    BUN 29 (H) 08/14/2019 08:15 PM    CREATININE 5.97 (HH) 08/14/2019 08:15 PM      Lab Results   Component Value Date/Time    ASTSGOT 18 08/14/2019 08:15 PM    ALTSGPT 12 08/14/2019 08:15 PM     Lab Results   Component Value Date/Time    TROPONINT 118 (H) 08/14/2019 08:15 PM       No results for input(s): NTPROBNP in the last 72 hours.    Cardiac Imaging and Procedures Review  EKG:  My personal interpretation of the EKG dated today shows sinus rhythm and probable left ventricular hypertrophy    Imaging  Chest X-Ray: At the outside hospital by report shows no infiltrate or effusion    Assessment/Plan    Abnormal troponin/concern for ACS  In the setting of dialysis stressors and probable left ventricular hypertrophy by voltage on EKG I do not suspect underlying acute coronary syndrome  I agree with the hospitalist that noninvasive risk stratification in the situation is the best approach  Plan a nuclear perfusion imaging study tomorrow, trending troponins  If she clinically deteriorates or troponins are extraordinarily high -we can easily reevaluate  If there are high risk features on her nuclear test such as an ejection fraction less  than 35% or large ischemia, this would suggest a higher risk situation and likely prompt a more invasive evaluation    Mitral valve disease  Normal sounding exam  Plan echo in the morning, agree with hospitalist    Anticoagulation  She does not sound like she has mechanical valve -confirmed on echo in the morning  I presume the only indication I can see is that it is for atrial fibrillation.  This would be somewhat off label based on the fact she has had a mitral valve repair  Hold for now until 24 hours we can more determine what the work-up for ACS is    We will follow her up as warranted based on the trend of her troponins in the next 24 hours  Thank you for allowing me to participate in the care of this patient.    Please contact me with any questions.    Hyun Paredes M.D.   Cardiologist, [facility]  [phone]

## 2019-08-15 NOTE — ED NOTES
Assist RN:  CDU RN at bedside, pt having active chest pain at this time with elevated troponin, decision was made that the patient needs to go to an inpatient telemetry floor

## 2019-08-15 NOTE — RESPIRATORY CARE
COPD EDUCATION by COPD CLINICAL EDUCATOR  8/15/2019 at 6:33 AM by Мария Medina     Patient reviewed by COPD education team. Patient does not have a history or diagnosis of COPD and is a non-smoker, therefore does not qualify for the COPD program.

## 2019-08-15 NOTE — ASSESSMENT & PLAN NOTE
Not a mechanical valve, not clear why she is on anticoagulation. Patient states it's for this MV repair.

## 2019-08-15 NOTE — PROGRESS NOTES
Heart Monitor Summary:    SR 64-79 partially paced w/ LBBB, rare PVC's, rare PAC's, and a run of 5 beats of V-tach.    0.18/0.08/0.44

## 2019-08-15 NOTE — ASSESSMENT & PLAN NOTE
No evidence of active bleeding. Hemodynamically stable. Plts stable at 102  - Mild, cont to montior

## 2019-08-15 NOTE — ED TRIAGE NOTES
Corie Smith    Chief Complaint   Patient presents with   • Chest Pain   • N/V       Pt BIBA from [facility].  Pt seen multiple times in ER for CP but left AMA.  During dialysis today, pt experienced sudden central CP.  Pt given Nitro SL and Nitro paste.  CP decreased from 7/10 to 3/10. Pt currently denies pain, SOB, dizziness.  +N/V today.

## 2019-08-15 NOTE — PROGRESS NOTES
Spiritual Care Note    Patient Information     Patient's Name: Corie Smith   MRN: [id number]    YOB: 1950   Age and Gender: 69 y.o. female   Service Area: [address]   Room (and Bed): [place] [address]   Ethnicity or Nationality: WHITE non     Primary Language: English   Mormonism/Spiritual preference: Yazdanism   Place of Residence: [place], [place]   Clinical Team Present: CNA   Medical Diagnosis(-es)/Procedure(s): Chest Pain   Code Status: Full Code    Date of Admission: 8/14/2019   Length of Stay: 0 days        Spiritual Care Provider Information:  Name of Spiritual Care Provider:Paulette Eller  Title of Spiritual Care Provider: Associate   Phone Number: [phone]  E-mail: [email or web address]  Total time : 20 minutes    Spiritual Screen Results:    Gen Nursing  Spiritual Screen  Is your spiritual health or inner well-being important to you as you cope with your medical condition?: Yes  Would you like to receive a visit from our Spiritual Care team or your own Jewish or spiritual leader?: Yes  Was spiritual care education provided to the patient?: Yes     Palliative Care  PC Mormonism/Spiritual Screening  Was spiritual care education provided to the patient?: Yes      Encounter/Request Information  Encounter/Request Type   Visited With: Patient  Nature of the Visit: Initial, On shift  Continue Visiting: (upon request)  General Visit: Yes  Referral From/ Origin of Request: Epic nursing    Religous Needs/Values  Mormonism Needs Visit  Mormonism Needs: Prayer  Ritual Needs Visit  Ritual Needs: [place]    Spiritual Assessment   Spiritual Care Encounters  Observations/Symptoms: Accepting, Anxious, Sadness(pt awake alert lying in bed)  Interacton/Conversation: Pt was eager for visit. Pt stated that she and her  had just moved here one week ago from [place] and that they are moving back. Pt was tearful at times whild talking. Pt stated that daughter does not want her here  with here . Pt stated that daughter does not like , that pt makes bad choices. Pt stated she makes the best choices she can. Pt said that they sold their house in two weeks where they had been living and they had to leave quickly. Pt stated that her  is trying to find a truck or SUV to get their belongings back to [place]. Pt was grateful for visit and said she felt better.  Assessment: Need, Distress  Need: Decision Making, Family Issues/Concern, Seeking Spiritual Assistance and Support  Distress: Anxiety about the Future, Overwhelmed  Interventions: Compassionate Listening; Encouragement; Prayer/[place]  Outcomes: Ability to Communicate with Truth and Honesty, Connectedness with the Holy/with God, Coping, Emotional Release, Progress with Trust, Spiritual Comfort  Plan: Visit Upon Request    Notes:

## 2019-08-15 NOTE — PROGRESS NOTES
"Hospital Medicine Daily Progress Note    Date of Service  8/15/2019    Chief Complaint  69 y.o. Female with CAD s/p CABG, PVD, HTN, ESRD and on anticoagulation with eliquis admitted 8/14/2019 with chest pain.    Interval Problem Update  Feels moderately improved today, denies any chest ain at this time and is \"grateful for that\". No acute overnight events. Evaluated by cardiology and plan for nuclear stress test but troponins have not yet been obtained.    Consultants/Specialty  Cardiology    Code Status  Full    Disposition  Anticipate home, pending medical clearance in the next 1-2 days.    Review of Systems  Review of Systems   Constitutional: Positive for malaise/fatigue (improving). Negative for chills and fever.   HENT: Negative for congestion.    Respiratory: Positive for shortness of breath (intermittent). Negative for cough and wheezing.    Cardiovascular: Negative for chest pain (improving), palpitations and leg swelling.   Gastrointestinal: Negative for abdominal pain, blood in stool, nausea and vomiting.   Genitourinary: Negative for dysuria.   Musculoskeletal: Negative for falls.   Neurological: Negative for dizziness, loss of consciousness and headaches.   Psychiatric/Behavioral: The patient is nervous/anxious.    All other systems reviewed and are negative.       Physical Exam  Temp:  [36 °C (96.8 °F)-37.1 °C (98.8 °F)] 36.1 °C (97 °F)  Pulse:  [67-87] 67  Resp:  [12-18] 16  BP: (109-138)/(45-95) 109/95  SpO2:  [90 %-98 %] 90 %    Physical Exam   Constitutional: She is oriented to person, place, and time. She appears well-developed. No distress.   HENT:   Head: Normocephalic.   Mouth/Throat: Oropharynx is clear and moist.   Eyes: EOM are normal. Right eye exhibits no discharge. Left eye exhibits no discharge. No scleral icterus.   Neck: Normal range of motion. No tracheal deviation present.   Cardiovascular: Normal rate, regular rhythm and intact distal pulses.   Pulmonary/Chest: Effort normal and " breath sounds normal. No stridor. No respiratory distress. She has no rales.   Abdominal: Soft. She exhibits no distension. There is tenderness (mild, diffuse). There is no guarding.   Musculoskeletal: She exhibits no edema or tenderness.   Neurological: She is alert and oriented to person, place, and time.   Skin: Skin is warm and dry. She is not diaphoretic.   Psychiatric: She has a normal mood and affect. Her behavior is normal.   Vitals reviewed.      Fluids    Intake/Output Summary (Last 24 hours) at 8/15/2019 5491  Last data filed at 8/14/2019 2300  Gross per 24 hour   Intake 300 ml   Output --   Net 300 ml       Laboratory  Recent Labs     08/14/19  2015 08/15/19  0050   WBC 5.5 5.1   RBC 3.11* 2.90*   HEMOGLOBIN 10.5* 10.0*   HEMATOCRIT 33.7* 31.3*   .4* 107.9*   MCH 33.8* 34.5*   MCHC 31.2* 31.9*   RDW 55.9* 55.7*   PLATELETCT 119* 103*   MPV 11.0 10.6     Recent Labs     08/14/19  2015 08/15/19  0050   SODIUM 138 138   POTASSIUM 4.1 4.1   CHLORIDE 92* 95*   CO2 33 31   GLUCOSE 274* 265*   BUN 29* 34*   CREATININE 5.97* 6.67*   CALCIUM 9.5 9.0                   Imaging  OUTSIDE IMAGES-CT ABDOMEN /PELVIS   Final Result      OUTSIDE IMAGES-DX CHEST   Final Result      OUTSIDE IMAGES-DX CHEST   Final Result      NM-CARDIAC STRESS TEST    (Results Pending)        Assessment/Plan  * Chest pain- (present on admission)  Assessment & Plan  In the setting of ESRD and known CAD s/p CABG. Presents to the [facility] for chest pain but has h/o leaving AMA. On day of admission she was undergoing HD when she developed chest pain and came in for further evaluation.   - holding eliquis until clearly no invasive intervention to be done  - NM perfusion pending  - repeat trop pending, if CP continues and trops continue to trend up may need to proceed to cardiac cath  - continue ASA  - telemetry monitoring    Colitis- (present on admission)  Assessment & Plan  This was diagnosed on CT scan at OSH. With diffuse  abdominal tenderness. ESR normal.  - continue with flagyl  - outpatient colonoscopy post acute setting    CAD (coronary artery disease)- (present on admission)  Assessment & Plan  S/p CABG  - conitnue home BB, asa, statin    DM (diabetes mellitus) ([facility])- (present on admission)  Assessment & Plan  - A1C pending  - sensitive sliding scale  - DM diet and hypoglycemia protocol    Anemia- (present on admission)  Assessment & Plan  Mild, cont to monitor. No evidence of active bleeding and occult blood at OSH negative  - continue to trend  - transfuse if hemoglobin < 7    Thrombocytopenia ([facility])- (present on admission)  Assessment & Plan  No evidence of active bleeding. Hemodynamically stable  - Mild, cont to montior     H/O mitral valve repair- (present on admission)  Assessment & Plan  Hx of MV repair. Not a mechanical valve, not clear why she is on anticoagulation. Patient states it's for this MV repair.    PVD (peripheral vascular disease) ([facility])- (present on admission)  Assessment & Plan  - resume eliquis when able  - continue ASA and statin     GERD (gastroesophageal reflux disease)- (present on admission)  Assessment & Plan  Controlled, cont to montior     HTN (hypertension)- (present on admission)  Assessment & Plan  Normotensive  - continue home coreg  - holding entresto, not on formulary    ESRD (end stage renal disease) ([facility])- (present on admission)  Assessment & Plan  ESRD on HD  - will need to consult nephro in the AM for maintenance HD       VTE prophylaxis: SCDs

## 2019-08-15 NOTE — DISCHARGE PLANNING
Outpatient Dialysis Note    Confirmed patient is active at:    [facility]  [address] [place] [address]      Schedule: Monday, Wednesday, Friday  Time: 11:00 am    Spoke with Noelle at facility who confirmed.    Forwarded records for review.    Dialysis Coordinator, [facility]  Camila Quigley [phone]

## 2019-08-15 NOTE — ED NOTES
Med rec partially completed, pt does not know the strength of Entresto. Med tech will follow up with pt's home pharmacy

## 2019-08-15 NOTE — ED PROVIDER NOTES
ED Provider Note    CHIEF COMPLAINT  Chief Complaint   Patient presents with   • Chest Pain   • N/V       HPI  Corie Smith is a 69 y.o. female who presents to emerge department complaint of nausea, vomiting, and chest pain.  Patient has a history of heart disease, and end-stage renal disease and she is on dialysis.  She is been to the hospital few times lately to the hospital and fell in [place].  She had a CT couple of days ago that showed colitis and wanted to send her here but she refused to come.  She returns today with chest discomfort.  She is undergoing dialysis and developed chest pain so she was sent here.  Pain is in the lower chest describes a pressure.  No tearing pain region the back.  There is no associated shortness of breath nausea vomiting or diaphoresis.  Patient denies any other acute concerns or complaints.    Is chronically on Eliquis but denies any history of PE or DVT per    The patient denies any other acute concerns or complaints.  History is somewhat limited because she is a poor historian.      REVIEW OF SYSTEMS  See HPI for further details. All other systems are negative.    PAST MEDICAL HISTORY  Past Medical History:   Diagnosis Date   • Diabetes (HCC)    • Dialysis patient (HCC)        FAMILY HISTORY  History reviewed. No pertinent family history.    SOCIAL HISTORY  Social History     Socioeconomic History   • Marital status: Not on file     Spouse name: Not on file   • Number of children: Not on file   • Years of education: Not on file   • Highest education level: Not on file   Occupational History   • Not on file   Social Needs   • Financial resource strain: Not on file   • Food insecurity:     Worry: Not on file     Inability: Not on file   • Transportation needs:     Medical: Not on file     Non-medical: Not on file   Tobacco Use   • Smoking status: Never Smoker   Substance and Sexual Activity   • Alcohol use: Never     Frequency: Never   • Drug use: Never   • Sexual activity: Not  "on file   Lifestyle   • Physical activity:     Days per week: Not on file     Minutes per session: Not on file   • Stress: Not on file   Relationships   • Social connections:     Talks on phone: Not on file     Gets together: Not on file     Attends Christian service: Not on file     Active member of club or organization: Not on file     Attends meetings of clubs or organizations: Not on file     Relationship status: Not on file   • Intimate partner violence:     Fear of current or ex partner: Not on file     Emotionally abused: Not on file     Physically abused: Not on file     Forced sexual activity: Not on file   Other Topics Concern   • Not on file   Social History Narrative   • Not on file       SURGICAL HISTORY  History reviewed. No pertinent surgical history.    CURRENT MEDICATIONS  Home Medications     Reviewed by Rodriguez Butts (Pharmacy Tech) on 08/14/19 at 2015  Med List Status: Partial   Medication Last Dose Status   albuterol 108 (90 Base) MCG/ACT Aero Soln inhalation aerosol unknown Active   apixaban (ELIQUIS) 2.5mg Tab unknown Active   atorvastatin (LIPITOR) 40 MG Tab unknown Active   calcium acetate (PHOS-LO) 667 MG Cap unknown Active   carvedilol (COREG) 6.25 MG Tab unknwon Active   cholecalciferol (VITAMIN D3) 400 UNIT Tab unknown Active   LORazepam (ATIVAN) 1 MG Tab unknown Active   nitroglycerin (NITROSTAT) 0.4 MG SL Tab unknown Active   ondansetron (ZOFRAN ODT) 4 MG TABLET DISPERSIBLE unknown Active   prochlorperazine (COMPAZINE) 5 MG Tab unknown Active   ROPINIRole (REQUIP) 0.25 MG Tab unknown Active   Sacubitril-Valsartan (ENTRESTO PO) unknown Active   tramadol (ULTRAM) 50 MG Tab unknown Active   Vortioxetine HBr (TRINTELLIX) 10 MG Tab unknown Active                ALLERGIES  Allergies   Allergen Reactions   • Digoxin    • Morphine    • Pcn [Penicillins]    • Sudafed        PHYSICAL EXAM  VITAL SIGNS: Temp 37.1 °C (98.8 °F) (Temporal)   Ht 1.626 m (5' 4\")   Wt 65.8 kg (145 lb)   BMI " "24.89 kg/m²    /60   Pulse 87   Temp 36 °C (96.8 °F) (Temporal)   Resp 15   Ht 1.626 m (5' 4\")   Wt 64.8 kg (142 lb 13.7 oz)   SpO2 95%   BMI 24.52 kg/m²     Constitutional: Awake alert chronically ill-appearing no acute distress.  HENT: Normocephalic, Atraumatic, Bilateral external ears normal, Oropharynx moist, No oral exudates, Nose normal.   Eyes: PERRL, EOMI, Conjunctiva normal, No discharge.   Neck: Normal range of motion, No tenderness, Supple, No stridor.   Cardiovascular: Normal heart rate, Normal rhythm, No murmurs, No rubs, No gallops.   Thorax & Lungs: Normal breath sounds, No respiratory distress, No wheezing, No chest tenderness.   Abdomen: Bowel sounds normal, Soft, No tenderness,   Skin: Warm, Dry, No erythema, No rash.   Back: No tenderness, No CVA tenderness. .   Musculoskeletal: Good range of motion in all major joints.  Moderate bilateral lower extremity edema  Neurologic: Alert, No focal deficits noted.   Psychiatric: Affect normal    Results for orders placed or performed during the hospital encounter of 08/14/19   CBC WITH DIFFERENTIAL   Result Value Ref Range    WBC 5.5 4.8 - 10.8 K/uL    RBC 3.11 (L) 4.20 - 5.40 M/uL    Hemoglobin 10.5 (L) 12.0 - 16.0 g/dL    Hematocrit 33.7 (L) 37.0 - 47.0 %    .4 (H) 81.4 - 97.8 fL    MCH 33.8 (H) 27.0 - 33.0 pg    MCHC 31.2 (L) 33.6 - 35.0 g/dL    RDW 55.9 (H) 35.9 - 50.0 fL    Platelet Count 119 (L) 164 - 446 K/uL    MPV 11.0 9.0 - 12.9 fL    Neutrophils-Polys 86.30 (H) 44.00 - 72.00 %    Lymphocytes 7.20 (L) 22.00 - 41.00 %    Monocytes 6.10 0.00 - 13.40 %    Eosinophils 0.00 0.00 - 6.90 %    Basophils 0.20 0.00 - 1.80 %    Immature Granulocytes 0.20 0.00 - 0.90 %    Nucleated RBC 0.00 /100 WBC    Neutrophils (Absolute) 4.78 2.00 - 7.15 K/uL    Lymphs (Absolute) 0.40 (L) 1.00 - 4.80 K/uL    Monos (Absolute) 0.34 0.00 - 0.85 K/uL    Eos (Absolute) 0.00 0.00 - 0.51 K/uL    Baso (Absolute) 0.01 0.00 - 0.12 K/uL    Immature Granulocytes " (abs) 0.01 0.00 - 0.11 K/uL    NRBC (Absolute) 0.00 K/uL   COMP METABOLIC PANEL   Result Value Ref Range    Sodium 138 135 - 145 mmol/L    Potassium 4.1 3.6 - 5.5 mmol/L    Chloride 92 (L) 96 - 112 mmol/L    Co2 33 20 - 33 mmol/L    Anion Gap 13.0 (H) 0.0 - 11.9    Glucose 274 (H) 65 - 99 mg/dL    Bun 29 (H) 8 - 22 mg/dL    Creatinine 5.97 (HH) 0.50 - 1.40 mg/dL    Calcium 9.5 8.5 - 10.5 mg/dL    AST(SGOT) 18 12 - 45 U/L    ALT(SGPT) 12 2 - 50 U/L    Alkaline Phosphatase 78 30 - 99 U/L    Total Bilirubin 0.8 0.1 - 1.5 mg/dL    Albumin 4.2 3.2 - 4.9 g/dL    Total Protein 7.7 6.0 - 8.2 g/dL    Globulin 3.5 1.9 - 3.5 g/dL    A-G Ratio 1.2 g/dL   TROPONIN   Result Value Ref Range    Troponin T 118 (H) 6 - 19 ng/L   LIPASE   Result Value Ref Range    Lipase 38 11 - 82 U/L   CRP QUANTITIVE (NON-CARDIAC)   Result Value Ref Range    Stat C-Reactive Protein 0.32 0.00 - 0.75 mg/dL   WESTERGREN SED RATE   Result Value Ref Range    Sed Rate Westergren 13 0 - 30 mm/hour   ESTIMATED GFR   Result Value Ref Range    GFR If  8 (A) >60 mL/min/1.73 m 2    GFR If Non African American 7 (A) >60 mL/min/1.73 m 2   EKG (NOW)   Result Value Ref Range    Report       [facility]    Test Date:  2019  Pt Name:    NICCI VALIENTE                 Department: ER  MRN:        [id number]                      Room:       RD 08  Gender:     Female                       Technician: [id number]  :        1950                   Requested By:ER TRIAGE PROTOCOL  Order #:    [id number]                    Reading MD:    Measurements  Intervals                                Axis  Rate:       80                           P:          50  AR:         139                          QRS:        -39  QRSD:       129                          T:          265  QT:         456  QTc:        527    Interpretive Statements  Sinus rhythm  Probable left atrial enlargement  Left bundle branch block  No previous ECG  available for comparison          RADIOLOGY/PROCEDURES  Results for orders placed or performed during the hospital encounter of 08/14/19   CBC WITH DIFFERENTIAL   Result Value Ref Range    WBC 5.5 4.8 - 10.8 K/uL    RBC 3.11 (L) 4.20 - 5.40 M/uL    Hemoglobin 10.5 (L) 12.0 - 16.0 g/dL    Hematocrit 33.7 (L) 37.0 - 47.0 %    .4 (H) 81.4 - 97.8 fL    MCH 33.8 (H) 27.0 - 33.0 pg    MCHC 31.2 (L) 33.6 - 35.0 g/dL    RDW 55.9 (H) 35.9 - 50.0 fL    Platelet Count 119 (L) 164 - 446 K/uL    MPV 11.0 9.0 - 12.9 fL    Neutrophils-Polys 86.30 (H) 44.00 - 72.00 %    Lymphocytes 7.20 (L) 22.00 - 41.00 %    Monocytes 6.10 0.00 - 13.40 %    Eosinophils 0.00 0.00 - 6.90 %    Basophils 0.20 0.00 - 1.80 %    Immature Granulocytes 0.20 0.00 - 0.90 %    Nucleated RBC 0.00 /100 WBC    Neutrophils (Absolute) 4.78 2.00 - 7.15 K/uL    Lymphs (Absolute) 0.40 (L) 1.00 - 4.80 K/uL    Monos (Absolute) 0.34 0.00 - 0.85 K/uL    Eos (Absolute) 0.00 0.00 - 0.51 K/uL    Baso (Absolute) 0.01 0.00 - 0.12 K/uL    Immature Granulocytes (abs) 0.01 0.00 - 0.11 K/uL    NRBC (Absolute) 0.00 K/uL   COMP METABOLIC PANEL   Result Value Ref Range    Sodium 138 135 - 145 mmol/L    Potassium 4.1 3.6 - 5.5 mmol/L    Chloride 92 (L) 96 - 112 mmol/L    Co2 33 20 - 33 mmol/L    Anion Gap 13.0 (H) 0.0 - 11.9    Glucose 274 (H) 65 - 99 mg/dL    Bun 29 (H) 8 - 22 mg/dL    Creatinine 5.97 (HH) 0.50 - 1.40 mg/dL    Calcium 9.5 8.5 - 10.5 mg/dL    AST(SGOT) 18 12 - 45 U/L    ALT(SGPT) 12 2 - 50 U/L    Alkaline Phosphatase 78 30 - 99 U/L    Total Bilirubin 0.8 0.1 - 1.5 mg/dL    Albumin 4.2 3.2 - 4.9 g/dL    Total Protein 7.7 6.0 - 8.2 g/dL    Globulin 3.5 1.9 - 3.5 g/dL    A-G Ratio 1.2 g/dL   TROPONIN   Result Value Ref Range    Troponin T 118 (H) 6 - 19 ng/L   LIPASE   Result Value Ref Range    Lipase 38 11 - 82 U/L   CRP QUANTITIVE (NON-CARDIAC)   Result Value Ref Range    Stat C-Reactive Protein 0.32 0.00 - 0.75 mg/dL   WESTERGREN SED RATE   Result Value Ref  Range    Sed Rate Westergren 13 0 - 30 mm/hour   ESTIMATED GFR   Result Value Ref Range    GFR If  8 (A) >60 mL/min/1.73 m 2    GFR If Non African American 7 (A) >60 mL/min/1.73 m 2   EKG (NOW)   Result Value Ref Range    Report       [facility] Emergency Dept.    Test Date:  2019  Pt Name:    NICCI VALIENTE                 Department: ER  MRN:        [id number]                      Room:        08  Gender:     Female                       Technician: [id number]  :        1950                   Requested By:ER TRIAGE PROTOCOL  Order #:    [id number]                    Reading MD:    Measurements  Intervals                                Axis  Rate:       80                           P:          50  AL:         139                          QRS:        -39  QRSD:       129                          T:          265  QT:         456  QTc:        527    Interpretive Statements  Sinus rhythm  Probable left atrial enlargement  Left bundle branch block  No previous ECG available for comparison        No orders to display         COURSE & MEDICAL DECISION MAKING  Pertinent Labs & Imaging studies reviewed. (See chart for details)    Patient is transferred from outside hospital.  Although he has some physicians since we do not have a complete set of labs.  Labs were repeated.  Presents work-up shows CT a couple of days ago that showed some colitis but really nothing else significant.  The patient had an x-ray that was unremarkable.  This is not much different than previous examination.      I repeat her labs that showed elevated troponin.  Consult to Dr. Paredes on-call for cardiology she came and saw the patient.    The patient's labs are otherwise reassuring.  Potassium is acceptable.  The patient will be admitted for further work-up and treatment.  I spoke with the hospitalist regarding this.  He will see the patient.    Chest x-ray there did not show evidence of pneumonia which is  no fever cough or sputum production.  She currently takes Eliquis sitting is less likely she has a DVT or PE.  Patient does not have chest pain is suggestive of dissection.  Should be admitted for further work-up and treatment.  She is admitted in guarded condition.    At this point the patient abdominal exam is benign without tenderness or peritonitis I do not think she requires repeat imaging.  This can be observed clinically while she is here    FINAL IMPRESSION  1. Other chest pain     2. Hx of CABG     3. Elevated troponin       **  2.   3.         Electronically signed by: Emeterio Kellogg, 8/14/2019 6:40 PM

## 2019-08-15 NOTE — ASSESSMENT & PLAN NOTE
Mild, cont to monitor. No evidence of active bleeding and occult blood at OSH negative  - continue to trend  - transfuse if hemoglobin < 7

## 2019-08-15 NOTE — ASSESSMENT & PLAN NOTE
This was diagnosed on CT scan at OSH. With diffuse abdominal tenderness. ESR normal.  - continue with flagyl  - outpatient colonoscopy post acute setting

## 2019-08-15 NOTE — CARE PLAN
Pt has communicated needs appropriately throughout this shift. She has voiced understanding of safety precautions and abides by them.

## 2019-08-16 ENCOUNTER — APPOINTMENT (OUTPATIENT)
Dept: RADIOLOGY | Facility: MEDICAL CENTER | Age: 69
End: 2019-08-16
Attending: HOSPITALIST
Payer: MEDICARE

## 2019-08-16 LAB
ANION GAP SERPL CALC-SCNC: 13 MMOL/L (ref 0–11.9)
BUN SERPL-MCNC: 48 MG/DL (ref 8–22)
CALCIUM SERPL-MCNC: 8.7 MG/DL (ref 8.5–10.5)
CHLORIDE SERPL-SCNC: 97 MMOL/L (ref 96–112)
CO2 SERPL-SCNC: 28 MMOL/L (ref 20–33)
CREAT SERPL-MCNC: 8.24 MG/DL (ref 0.5–1.4)
ERYTHROCYTE [DISTWIDTH] IN BLOOD BY AUTOMATED COUNT: 57.3 FL (ref 35.9–50)
EST. AVERAGE GLUCOSE BLD GHB EST-MCNC: 177 MG/DL
GLUCOSE BLD-MCNC: 139 MG/DL (ref 65–99)
GLUCOSE BLD-MCNC: 157 MG/DL (ref 65–99)
GLUCOSE BLD-MCNC: 157 MG/DL (ref 65–99)
GLUCOSE BLD-MCNC: 162 MG/DL (ref 65–99)
GLUCOSE BLD-MCNC: 177 MG/DL (ref 65–99)
GLUCOSE SERPL-MCNC: 172 MG/DL (ref 65–99)
HAV IGM SERPL QL IA: NEGATIVE
HBA1C MFR BLD: 7.8 % (ref 0–5.6)
HBV CORE IGM SER QL: NEGATIVE
HBV SURFACE AB SERPL IA-ACNC: 9.63 MIU/ML (ref 0–10)
HBV SURFACE AG SER QL: NEGATIVE
HCT VFR BLD AUTO: 32.6 % (ref 37–47)
HCV AB SER QL: NEGATIVE
HGB BLD-MCNC: 10.1 G/DL (ref 12–16)
MCH RBC QN AUTO: 34.4 PG (ref 27–33)
MCHC RBC AUTO-ENTMCNC: 31 G/DL (ref 33.6–35)
MCV RBC AUTO: 110.9 FL (ref 81.4–97.8)
PLATELET # BLD AUTO: 102 K/UL (ref 164–446)
PMV BLD AUTO: 11.3 FL (ref 9–12.9)
POTASSIUM SERPL-SCNC: 4.4 MMOL/L (ref 3.6–5.5)
RBC # BLD AUTO: 2.94 M/UL (ref 4.2–5.4)
SODIUM SERPL-SCNC: 138 MMOL/L (ref 135–145)
WBC # BLD AUTO: 5.1 K/UL (ref 4.8–10.8)

## 2019-08-16 PROCEDURE — 83036 HEMOGLOBIN GLYCOSYLATED A1C: CPT

## 2019-08-16 PROCEDURE — 700102 HCHG RX REV CODE 250 W/ 637 OVERRIDE(OP): Performed by: HOSPITALIST

## 2019-08-16 PROCEDURE — G0378 HOSPITAL OBSERVATION PER HR: HCPCS

## 2019-08-16 PROCEDURE — 80074 ACUTE HEPATITIS PANEL: CPT

## 2019-08-16 PROCEDURE — 90935 HEMODIALYSIS ONE EVALUATION: CPT

## 2019-08-16 PROCEDURE — 80048 BASIC METABOLIC PNL TOTAL CA: CPT

## 2019-08-16 PROCEDURE — A9270 NON-COVERED ITEM OR SERVICE: HCPCS | Performed by: HOSPITALIST

## 2019-08-16 PROCEDURE — 36415 COLL VENOUS BLD VENIPUNCTURE: CPT

## 2019-08-16 PROCEDURE — 700111 HCHG RX REV CODE 636 W/ 250 OVERRIDE (IP): Performed by: HOSPITALIST

## 2019-08-16 PROCEDURE — 82962 GLUCOSE BLOOD TEST: CPT | Mod: 91

## 2019-08-16 PROCEDURE — 99225 PR SUBSEQUENT OBSERVATION CARE,LEVEL II: CPT | Performed by: HOSPITALIST

## 2019-08-16 PROCEDURE — 85027 COMPLETE CBC AUTOMATED: CPT

## 2019-08-16 PROCEDURE — 96372 THER/PROPH/DIAG INJ SC/IM: CPT | Mod: XU

## 2019-08-16 PROCEDURE — 99204 OFFICE O/P NEW MOD 45 MIN: CPT | Performed by: INTERNAL MEDICINE

## 2019-08-16 PROCEDURE — 86706 HEP B SURFACE ANTIBODY: CPT

## 2019-08-16 PROCEDURE — 96366 THER/PROPH/DIAG IV INF ADDON: CPT

## 2019-08-16 PROCEDURE — 96365 THER/PROPH/DIAG IV INF INIT: CPT | Mod: XU

## 2019-08-16 PROCEDURE — 700111 HCHG RX REV CODE 636 W/ 250 OVERRIDE (IP)

## 2019-08-16 PROCEDURE — A9502 TC99M TETROFOSMIN: HCPCS

## 2019-08-16 RX ORDER — AMINOPHYLLINE 25 MG/ML
100 INJECTION, SOLUTION INTRAVENOUS ONCE
Status: COMPLETED | OUTPATIENT
Start: 2019-08-16 | End: 2019-08-17

## 2019-08-16 RX ORDER — ALBUMIN (HUMAN) 12.5 G/50ML
12.5 SOLUTION INTRAVENOUS
Status: DISCONTINUED | OUTPATIENT
Start: 2019-08-16 | End: 2019-08-17 | Stop reason: HOSPADM

## 2019-08-16 RX ORDER — AMINOPHYLLINE 25 MG/ML
INJECTION, SOLUTION INTRAVENOUS
Status: DISPENSED
Start: 2019-08-16 | End: 2019-08-17

## 2019-08-16 RX ORDER — SODIUM CHLORIDE 9 MG/ML
250 INJECTION, SOLUTION INTRAVENOUS
Status: DISCONTINUED | OUTPATIENT
Start: 2019-08-16 | End: 2019-08-17 | Stop reason: HOSPADM

## 2019-08-16 RX ORDER — REGADENOSON 0.08 MG/ML
INJECTION, SOLUTION INTRAVENOUS
Status: COMPLETED
Start: 2019-08-16 | End: 2019-08-16

## 2019-08-16 RX ADMIN — TRAMADOL HYDROCHLORIDE 50 MG: 50 TABLET, FILM COATED ORAL at 05:31

## 2019-08-16 RX ADMIN — ATORVASTATIN CALCIUM 40 MG: 40 TABLET, FILM COATED ORAL at 21:13

## 2019-08-16 RX ADMIN — CARVEDILOL 6.25 MG: 6.25 TABLET, FILM COATED ORAL at 21:13

## 2019-08-16 RX ADMIN — METRONIDAZOLE 500 MG: 500 TABLET, FILM COATED ORAL at 05:32

## 2019-08-16 RX ADMIN — CARVEDILOL 6.25 MG: 6.25 TABLET, FILM COATED ORAL at 05:31

## 2019-08-16 RX ADMIN — METRONIDAZOLE 500 MG: 500 TABLET, FILM COATED ORAL at 12:45

## 2019-08-16 RX ADMIN — ROPINIROLE HYDROCHLORIDE 0.25 MG: 0.5 TABLET, FILM COATED ORAL at 11:09

## 2019-08-16 RX ADMIN — LORAZEPAM 1 MG: 1 TABLET ORAL at 18:13

## 2019-08-16 RX ADMIN — TRAMADOL HYDROCHLORIDE 50 MG: 50 TABLET, FILM COATED ORAL at 21:13

## 2019-08-16 RX ADMIN — LORAZEPAM 1 MG: 1 TABLET ORAL at 05:32

## 2019-08-16 RX ADMIN — AMINOPHYLLINE 100 MG: 25 INJECTION, SOLUTION INTRAVENOUS at 14:34

## 2019-08-16 RX ADMIN — INSULIN HUMAN 1 UNITS: 100 INJECTION, SOLUTION PARENTERAL at 18:10

## 2019-08-16 RX ADMIN — ROPINIROLE HYDROCHLORIDE 0.25 MG: 0.5 TABLET, FILM COATED ORAL at 18:12

## 2019-08-16 RX ADMIN — METRONIDAZOLE 500 MG: 500 TABLET, FILM COATED ORAL at 21:13

## 2019-08-16 RX ADMIN — ASPIRIN 81 MG: 81 TABLET, COATED ORAL at 05:32

## 2019-08-16 RX ADMIN — REGADENOSON 0.4 MG: 0.08 INJECTION, SOLUTION INTRAVENOUS at 14:18

## 2019-08-16 RX ADMIN — ROPINIROLE HYDROCHLORIDE 0.25 MG: 0.5 TABLET, FILM COATED ORAL at 05:31

## 2019-08-16 RX ADMIN — TRAMADOL HYDROCHLORIDE 50 MG: 50 TABLET, FILM COATED ORAL at 11:09

## 2019-08-16 RX ADMIN — LORAZEPAM 1 MG: 1 TABLET ORAL at 12:46

## 2019-08-16 ASSESSMENT — ENCOUNTER SYMPTOMS
WEAKNESS: 1
FEVER: 0
SHORTNESS OF BREATH: 1
NERVOUS/ANXIOUS: 1
FOCAL WEAKNESS: 0
COUGH: 0
SHORTNESS OF BREATH: 0
ABDOMINAL PAIN: 0
BLOOD IN STOOL: 0
LOSS OF CONSCIOUSNESS: 0
HEADACHES: 0
DIZZINESS: 0
VOMITING: 0
CHILLS: 0
FALLS: 0
NAUSEA: 0
WHEEZING: 0
PALPITATIONS: 0

## 2019-08-16 NOTE — CONSULTS
Nephrology Consultation    Date of Service  8/16/2019    Referring Physician  Leyda Novak M.D.    Consulting Physician  Hudson Montanez M.D.    Reason for Consultation  Management of ESRD on HD      History of Presenting Illness  69 y.o. female with ESRD on HD who presented 8/14/2019 with chest pain.    The patient was getting her regularly scheduled dialysis on Wednesday, 2 days ago, when she experienced chest pain.  Her dialysis treatment was cut short by 1 hour.  The patient was transferred to the hospital.  The patient is being evaluated and worked up for unstable angina.    Regarding her dialysis, the patient has been on dialysis for years.  She says she still urinates.  She lives in [place], had moved down here to try to find new housing, but is planning on moving back to [place].  She currently denies chest pain, shortness of breath.  The patient has a left upper arm AV fistula that works well.    Review of Systems  Review of Systems   Constitutional: Negative for fever.   Respiratory: Negative for shortness of breath.    Cardiovascular: Negative for chest pain.   Gastrointestinal: Negative for abdominal pain.   All other systems reviewed and are negative.      Past Medical History  Past Medical History:   Diagnosis Date   • Diabetes (HCC)    • Dialysis patient (HCC)        Surgical History  History reviewed. No pertinent surgical history.    Family History  History reviewed. No pertinent family history.    Social History  Social History     Socioeconomic History   • Marital status:      Spouse name: Not on file   • Number of children: Not on file   • Years of education: Not on file   • Highest education level: Not on file   Occupational History   • Not on file   Social Needs   • Financial resource strain: Not on file   • Food insecurity:     Worry: Not on file     Inability: Not on file   • Transportation needs:     Medical: Not on file     Non-medical: Not on file   Tobacco Use   • Smoking status:  Never Smoker   Substance and Sexual Activity   • Alcohol use: Never     Frequency: Never   • Drug use: Never   • Sexual activity: Not on file   Lifestyle   • Physical activity:     Days per week: Not on file     Minutes per session: Not on file   • Stress: Not on file   Relationships   • Social connections:     Talks on phone: Not on file     Gets together: Not on file     Attends Jainism service: Not on file     Active member of club or organization: Not on file     Attends meetings of clubs or organizations: Not on file     Relationship status: Not on file   • Intimate partner violence:     Fear of current or ex partner: Not on file     Emotionally abused: Not on file     Physically abused: Not on file     Forced sexual activity: Not on file   Other Topics Concern   • Not on file   Social History Narrative   • Not on file       Medications  Current Facility-Administered Medications   Medication Dose Route Frequency Provider Last Rate Last Dose   • AMINOPHYLLINE 25 MG/ML IV SOLN            • NS (BOLUS) infusion 250 mL  250 mL Intravenous DIALYSIS PRN Hudson Montanez M.D.       • albumin human 25% solution 12.5 g  12.5 g Intravenous DIALYSIS PRN Hudson Montanez M.D.       • lidocaine (XYLOCAINE) 1 % injection 1 mL  1 mL Intradermal PRN Hudson Montanez M.D.       • LORazepam (ATIVAN) tablet 1 mg  1 mg Oral QDAY PRN Leyda Novak M.D.   1 mg at 08/16/19 1246   • aspirin EC (ECOTRIN) tablet 81 mg  81 mg Oral DAILY Leyda Novak M.D.   81 mg at 08/16/19 0532   • senna-docusate (PERICOLACE or SENOKOT S) 8.6-50 MG per tablet 2 Tab  2 Tab Oral BID Jose Ramesh M.D.        And   • polyethylene glycol/lytes (MIRALAX) PACKET 1 Packet  1 Packet Oral QDAY PRN Jose Ramesh M.D.        And   • magnesium hydroxide (MILK OF MAGNESIA) suspension 30 mL  30 mL Oral QDAY PRN Joes Ramesh M.D.        And   • bisacodyl (DULCOLAX) suppository 10 mg  10 mg Rectal QDAY PRN Jose Ramesh M.D.       • Respiratory Care per  Protocol   Nebulization Continuous RT Jose Ramesh M.D.       • atorvastatin (LIPITOR) tablet 40 mg  40 mg Oral Nightly Jose Ramesh M.D.   40 mg at 08/15/19 2044   • carvedilol (COREG) tablet 6.25 mg  6.25 mg Oral BID WITH MEALS Jose Ramesh M.D.   6.25 mg at 08/16/19 0531   • LORazepam (ATIVAN) tablet 1 mg  1 mg Oral BID Jose Ramesh M.D.   1 mg at 08/16/19 0532   • ROPINIRole (REQUIP) tablet 0.25 mg  0.25 mg Oral TID Jose Ramesh M.D.   0.25 mg at 08/16/19 1109   • tramadol (ULTRAM) 50 MG tablet 50 mg  50 mg Oral Q4HRS PRN Jose Ramesh M.D.   50 mg at 08/16/19 1109   • metroNIDAZOLE (FLAGYL) tablet 500 mg  500 mg Oral Q8HRS Jsoe Ramesh M.D.   500 mg at 08/16/19 1245   • insulin regular (HUMULIN R) injection 1-6 Units  1-6 Units Subcutaneous Q6HRS Jose Ramesh M.D.   Stopped at 08/15/19 1800    And   • glucose 4 g chewable tablet 16 g  16 g Oral Q15 MIN PRN Jose Ramesh M.D.        And   • DEXTROSE 10% BOLUS 250 mL  250 mL Intravenous Q15 MIN PRN Jose Ramesh M.D.           Allergies  Allergies   Allergen Reactions   • Digoxin    • Morphine    • Pcn [Penicillins]    • Sudafed        Physical Exam  Temp:  [36 °C (96.8 °F)-36.6 °C (97.8 °F)] 36.4 °C (97.6 °F)  Pulse:  [59-72] 72  Resp:  [16-18] 18  BP: (110-149)/(50-71) 146/65  SpO2:  [91 %-95 %] 95 %    Physical Exam   Constitutional: She is oriented to person, place, and time. She appears well-developed. No distress.   HENT:   Mouth/Throat: Oropharynx is clear and moist. No oropharyngeal exudate.   Eyes: EOM are normal. No scleral icterus.   Neck: No tracheal deviation present.   Cardiovascular:   Murmur heard.  Pulmonary/Chest: Effort normal. No stridor. She has no rales.   Abdominal: Soft. There is no tenderness.   Musculoskeletal: Normal range of motion. She exhibits no edema.   Neurological: She is alert and oriented to person, place, and time.   Skin: Skin is warm and dry.   Psychiatric: She has a normal mood  and affect.   Access: Left brachiocephalic AV fistula, positive bruit and thrill    Fluids      Laboratory  Labs reviewed, pertinent labs below.  Recent Labs     08/14/19  2015 08/15/19  0050 08/16/19  0211   WBC 5.5 5.1 5.1   RBC 3.11* 2.90* 2.94*   HEMOGLOBIN 10.5* 10.0* 10.1*   HEMATOCRIT 33.7* 31.3* 32.6*   .4* 107.9* 110.9*   MCH 33.8* 34.5* 34.4*   MCHC 31.2* 31.9* 31.0*   RDW 55.9* 55.7* 57.3*   PLATELETCT 119* 103* 102*   MPV 11.0 10.6 11.3     Recent Labs     08/14/19  2015 08/15/19  0050 08/16/19  0211   SODIUM 138 138 138   POTASSIUM 4.1 4.1 4.4   CHLORIDE 92* 95* 97   CO2 33 31 28   GLUCOSE 274* 265* 172*   BUN 29* 34* 48*   CREATININE 5.97* 6.67* 8.24*   CALCIUM 9.5 9.0 8.7                URINALYSIS:  No results found for: COLORURINE, CLARITY, SPECGRAVITY, PHURINE, KETONES, PROTEINURIN, BILIRUBINUR, UROBILU, NITRITE, LEUKESTERAS, OCCULTBLOOD  UPC  No results found for: TOTPROTUR No results found for: CREATININEU    Imaging reviewed  OUTSIDE IMAGES-CT ABDOMEN /PELVIS   Final Result      OUTSIDE IMAGES-DX CHEST   Final Result      OUTSIDE IMAGES-DX CHEST   Final Result      NM-CARDIAC STRESS TEST    (Results Pending)         Assessment/Plan  69 y.o. female with ESRD on HD who presented 8/14/2019 with chest pain.    1.  ESRD on hemodialysis Monday Wednesday Friday.  Nonoliguric.  Avoid nephrotoxins.  Plan for dialysis today per usual schedule.  Access: Left brachiocephalic AV fistula    2.  Chest pain, will do ultrafiltration with dialysis cautiously given recent chest pain.  Work-up per cardiology.    3.  Anemia of chronic disease.  Holding Epogen given recent chest pain, and given hemoglobin over 10.  Check CBC daily    4.  Thrombocytopenia, mild.  Check CBC daily.    5.  Electrolytes, controlled.    6.  Hypertension, controlled.  We will plan on ultrafiltration with dialysis as tolerated.      Hudson Montanez MD  Nephrology

## 2019-08-16 NOTE — PROGRESS NOTES
"Hospital Medicine Daily Progress Note    Date of Service  8/16/2019    Chief Complaint  69 y.o. Female with CAD s/p CABG, PVD, HTN, ESRD and on anticoagulation with eliquis admitted 8/14/2019 with chest pain.    Interval Problem Update  Feels moderately improved today, denies any chest ain at this time and is \"grateful for that\". No acute overnight events. Evaluated by cardiology and plan for nuclear stress test but troponins have not yet been obtained.    Consultants/Specialty  Cardiology    Code Status  Full    Disposition  Anticipate home in the next 1-2 days. Pending Cardiology clearance.     Review of Systems  Review of Systems   Constitutional: Positive for malaise/fatigue (greatly improved). Negative for chills and fever.   HENT: Negative for congestion.    Respiratory: Positive for shortness of breath (improving). Negative for cough and wheezing.    Cardiovascular: Negative for chest pain (resolved), palpitations and leg swelling.   Gastrointestinal: Negative for abdominal pain, blood in stool, nausea and vomiting.   Genitourinary: Negative for dysuria.   Musculoskeletal: Negative for falls.   Neurological: Positive for weakness (mild, generalized). Negative for dizziness, focal weakness, loss of consciousness and headaches.   Psychiatric/Behavioral: The patient is nervous/anxious.    All other systems reviewed and are negative.       Physical Exam  Temp:  [36 °C (96.8 °F)-36.3 °C (97.4 °F)] 36.1 °C (96.9 °F)  Pulse:  [64-68] 64  Resp:  [16-18] 16  BP: (110-149)/(39-71) 149/61  SpO2:  [91 %-95 %] 95 %    Physical Exam   Constitutional: She is oriented to person, place, and time. She appears well-developed. No distress.   HENT:   Head: Normocephalic.   Mouth/Throat: Oropharynx is clear and moist.   Eyes: EOM are normal. Right eye exhibits no discharge. Left eye exhibits no discharge. No scleral icterus.   Neck: Normal range of motion. No tracheal deviation present.   Cardiovascular: Normal rate, regular " rhythm and intact distal pulses.   Pulmonary/Chest: Effort normal and breath sounds normal. No stridor. No respiratory distress. She has no rales.   Abdominal: Soft. She exhibits no distension. There is tenderness (mild, diffuse). There is no guarding.   Musculoskeletal: She exhibits no edema or tenderness.   Neurological: She is alert and oriented to person, place, and time.   Skin: Skin is warm and dry. She is not diaphoretic.   Psychiatric: She has a normal mood and affect. Her behavior is normal.   Vitals reviewed.  Patient seen and examined today on 8/16, unchanged from 8/15.     Fluids  No intake or output data in the 24 hours ending 08/16/19 0752    Laboratory  Recent Labs     08/14/19  2015 08/15/19  0050 08/16/19  0211   WBC 5.5 5.1 5.1   RBC 3.11* 2.90* 2.94*   HEMOGLOBIN 10.5* 10.0* 10.1*   HEMATOCRIT 33.7* 31.3* 32.6*   .4* 107.9* 110.9*   MCH 33.8* 34.5* 34.4*   MCHC 31.2* 31.9* 31.0*   RDW 55.9* 55.7* 57.3*   PLATELETCT 119* 103* 102*   MPV 11.0 10.6 11.3     Recent Labs     08/14/19  2015 08/15/19  0050 08/16/19  0211   SODIUM 138 138 138   POTASSIUM 4.1 4.1 4.4   CHLORIDE 92* 95* 97   CO2 33 31 28   GLUCOSE 274* 265* 172*   BUN 29* 34* 48*   CREATININE 5.97* 6.67* 8.24*   CALCIUM 9.5 9.0 8.7                   Imaging  OUTSIDE IMAGES-CT ABDOMEN /PELVIS   Final Result      OUTSIDE IMAGES-DX CHEST   Final Result      OUTSIDE IMAGES-DX CHEST   Final Result      NM-CARDIAC STRESS TEST    (Results Pending)        Assessment/Plan  * Chest pain- (present on admission)  Assessment & Plan  In the setting of ESRD and known CAD s/p CABG. Presents to the [facility] for chest pain but has h/o leaving AMA. On day of admission she was undergoing HD when she developed chest pain and came in for further evaluation.   - cardiology following, appreciate recs  - holding eliquis until clearly no invasive intervention to be done  - NM perfusion pending  - continue ASA  - telemetry monitoring    Colitis-  (present on admission)  Assessment & Plan  This was diagnosed on CT scan at OSH. With diffuse abdominal tenderness. ESR normal.  - continue with flagyl  - outpatient colonoscopy post acute setting    CAD (coronary artery disease)- (present on admission)  Assessment & Plan  S/p CABG  - conitnue home BB, asa, statin    DM (diabetes mellitus) ([facility])- (present on admission)  Assessment & Plan  - A1C pending  - sensitive sliding scale  - DM diet and hypoglycemia protocol    Anemia- (present on admission)  Assessment & Plan  Mild, cont to monitor. No evidence of active bleeding and occult blood at OSH negative  - continue to trend  - transfuse if hemoglobin < 7    Thrombocytopenia ([facility])- (present on admission)  Assessment & Plan  No evidence of active bleeding. Hemodynamically stable. Plts stable at 102  - Mild, cont to montior     H/O mitral valve repair- (present on admission)  Assessment & Plan  Not a mechanical valve, not clear why she is on anticoagulation. Patient states it's for this MV repair.    PVD (peripheral vascular disease) ([facility])- (present on admission)  Assessment & Plan  - resume eliquis when able  - continue ASA and statin     GERD (gastroesophageal reflux disease)- (present on admission)  Assessment & Plan  Controlled, cont to montior.    HTN (hypertension)- (present on admission)  Assessment & Plan  Normotensive.  - continue home coreg  - holding entresto, not on formulary    ESRD (end stage renal disease) ([facility])- (present on admission)  Assessment & Plan  ESRD on HD- MWF  - consulted nephro today, appreciate recs  - plan for HD today  - renal diet       VTE prophylaxis: SCDs

## 2019-08-16 NOTE — CARE PLAN
Problem: Safety  Goal: Will remain free from injury  Outcome: PROGRESSING AS EXPECTED  Note:   Bed locked and in lowest position.  Bed alarm on.  Treaded socks.  Call light and belongings with in reach.  Pt educated to call for assistance. Pt verbalized understanding.  Hourly rounding in place.       Problem: Knowledge Deficit  Goal: Knowledge of disease process/condition, treatment plan, diagnostic tests, and medications will improve  Outcome: PROGRESSING AS EXPECTED  Note:   Discussed POC and medications with patient.  Patient verbalized understanding.

## 2019-08-16 NOTE — PROGRESS NOTES
Assumed care of patient and bedside report received from previous RN. Plan of care discussed with patient. All concerns voiced at this time. Patient is alert and oriented x 4. VSS. Patient educated on importance of calling, bed controls on, bed locked and in lowest position, call light with patient. Patient is in rhythm: SR.

## 2019-08-17 ENCOUNTER — PATIENT OUTREACH (OUTPATIENT)
Dept: HEALTH INFORMATION MANAGEMENT | Facility: OTHER | Age: 69
End: 2019-08-17

## 2019-08-17 VITALS
HEIGHT: 64 IN | TEMPERATURE: 97.3 F | WEIGHT: 141.54 LBS | BODY MASS INDEX: 24.16 KG/M2 | RESPIRATION RATE: 18 BRPM | HEART RATE: 68 BPM | OXYGEN SATURATION: 94 % | SYSTOLIC BLOOD PRESSURE: 148 MMHG | DIASTOLIC BLOOD PRESSURE: 56 MMHG

## 2019-08-17 PROBLEM — R07.9 CHEST PAIN: Status: RESOLVED | Noted: 2019-08-14 | Resolved: 2019-08-17

## 2019-08-17 PROBLEM — K52.9 COLITIS: Status: RESOLVED | Noted: 2019-08-14 | Resolved: 2019-08-17

## 2019-08-17 LAB
ANION GAP SERPL CALC-SCNC: 12 MMOL/L (ref 0–11.9)
BUN SERPL-MCNC: 26 MG/DL (ref 8–22)
CALCIUM SERPL-MCNC: 9.3 MG/DL (ref 8.5–10.5)
CHLORIDE SERPL-SCNC: 98 MMOL/L (ref 96–112)
CO2 SERPL-SCNC: 28 MMOL/L (ref 20–33)
CREAT SERPL-MCNC: 5.45 MG/DL (ref 0.5–1.4)
GLUCOSE BLD-MCNC: 139 MG/DL (ref 65–99)
GLUCOSE BLD-MCNC: 180 MG/DL (ref 65–99)
GLUCOSE SERPL-MCNC: 124 MG/DL (ref 65–99)
POTASSIUM SERPL-SCNC: 3.8 MMOL/L (ref 3.6–5.5)
SODIUM SERPL-SCNC: 138 MMOL/L (ref 135–145)

## 2019-08-17 PROCEDURE — 700102 HCHG RX REV CODE 250 W/ 637 OVERRIDE(OP): Performed by: HOSPITALIST

## 2019-08-17 PROCEDURE — 99217 PR OBSERVATION CARE DISCHARGE: CPT | Performed by: HOSPITALIST

## 2019-08-17 PROCEDURE — 80048 BASIC METABOLIC PNL TOTAL CA: CPT

## 2019-08-17 PROCEDURE — 36415 COLL VENOUS BLD VENIPUNCTURE: CPT

## 2019-08-17 PROCEDURE — G0378 HOSPITAL OBSERVATION PER HR: HCPCS

## 2019-08-17 PROCEDURE — A9270 NON-COVERED ITEM OR SERVICE: HCPCS | Performed by: HOSPITALIST

## 2019-08-17 PROCEDURE — 96376 TX/PRO/DX INJ SAME DRUG ADON: CPT

## 2019-08-17 PROCEDURE — 96372 THER/PROPH/DIAG INJ SC/IM: CPT | Mod: XU

## 2019-08-17 PROCEDURE — 82962 GLUCOSE BLOOD TEST: CPT

## 2019-08-17 RX ORDER — METRONIDAZOLE 500 MG/1
500 TABLET ORAL EVERY 8 HOURS
Qty: 6 TAB | Refills: 0 | Status: SHIPPED | OUTPATIENT
Start: 2019-08-17 | End: 2019-08-19

## 2019-08-17 RX ORDER — LORAZEPAM 1 MG/1
1 TABLET ORAL 2 TIMES DAILY
Qty: 14 TAB | Refills: 0 | Status: SHIPPED | OUTPATIENT
Start: 2019-08-17 | End: 2019-08-24

## 2019-08-17 RX ADMIN — ROPINIROLE HYDROCHLORIDE 0.25 MG: 0.5 TABLET, FILM COATED ORAL at 05:05

## 2019-08-17 RX ADMIN — METRONIDAZOLE 500 MG: 500 TABLET, FILM COATED ORAL at 05:06

## 2019-08-17 RX ADMIN — TRAMADOL HYDROCHLORIDE 50 MG: 50 TABLET, FILM COATED ORAL at 01:07

## 2019-08-17 RX ADMIN — ROPINIROLE HYDROCHLORIDE 0.25 MG: 0.5 TABLET, FILM COATED ORAL at 11:47

## 2019-08-17 RX ADMIN — LORAZEPAM 1 MG: 1 TABLET ORAL at 05:06

## 2019-08-17 RX ADMIN — ASPIRIN 81 MG: 81 TABLET, COATED ORAL at 05:06

## 2019-08-17 RX ADMIN — CARVEDILOL 6.25 MG: 6.25 TABLET, FILM COATED ORAL at 05:06

## 2019-08-17 NOTE — PROGRESS NOTES
Hd treatment ordered by Dr Montanez started at 1722 and ended at 2025 with net uf of 2000 ml as bp tolerated. Prior to HD, pt had to do stress test. Waited for pt to get done and waited for transport for total of 2 hrs. Cannulated left AVF x 2 using 15 gauge needles without problem. Held sites for 20 minutes without bleeding noted.  Post treatment, primary RN Osmany came over to transport pt back in the room due to pt was crying and wanted to go back to room asap. See flow sheet for details.

## 2019-08-17 NOTE — PROGRESS NOTES
Pt received discharge teaching and left with a paper prescription of flagyl and ativan with . All questions answered concerning discharge teaching.

## 2019-08-17 NOTE — DISCHARGE INSTRUCTIONS
Discharge Instructions per Leyda Novak M.D.    Please follow up with your PCP, nephrology and cardiologist.     DIET: renal diet    ACTIVITY: as tolerated    DIAGNOSIS: non cardiac chest pain and colitis    Return to ER if your develop new or worsening symptoms.      Discharge Instructions    Discharged to home by car with relative. Discharged via wheelchair, hospital escort: Yes.  Special equipment needed: Not Applicable    Be sure to schedule a follow-up appointment with your primary care doctor or any specialists as instructed.     Discharge Plan:   Diet Plan: Discussed  Activity Level: Discussed  Confirmed Follow up Appointment: Appointment Scheduled  Confirmed Symptoms Management: Discussed  Medication Reconciliation Updated: Yes  Influenza Vaccine Indication: Indicated: Not available from distributor/    I understand that a diet low in cholesterol, fat, and sodium is recommended for good health. Unless I have been given specific instructions below for another diet, I accept this instruction as my diet prescription.   Other diet: Cardiac, Renal    Special Instructions: None    · Is patient discharged on Warfarin / Coumadin?   No     Chest Pain, Nonspecific  It is often hard to give a specific diagnosis for the cause of chest pain. There is always a chance that your pain could be related to something serious, like a heart attack or a blood clot in the lungs. You need to follow up with your caregiver for further evaluation. More lab tests or other studies such as X-rays, electrocardiography, stress testing, or cardiac imaging may be needed to find the cause of your pain.  Most of the time, nonspecific chest pain improves within 2 to 3 days with rest and mild pain medicine. For the next few days, avoid physical exertion or activities that bring on pain. Do not smoke. Avoid drinking alcohol. Call your caregiver for routine follow-up as advised.   SEEK IMMEDIATE MEDICAL CARE IF:  · You develop  increased chest pain or pain that radiates to the arm, neck, jaw, back, or abdomen.   · You develop shortness of breath, increased coughing, or you start coughing up blood.   · You have severe back or abdominal pain, nausea, or vomiting.   · You develop severe weakness, fainting, fever, or chills.   Document Released: 12/18/2006 Document Revised: 03/11/2013 Document Reviewed: 06/06/2008  ExitCare® Patient Information ©2013 [employer].    Metronidazole tablets or capsules  What is this medicine?  METRONIDAZOLE (me troe NI da zole) is an antiinfective. It is used to treat certain kinds of bacterial and protozoal infections. It will not work for colds, flu, or other viral infections.  This medicine may be used for other purposes; ask your health care provider or pharmacist if you have questions.  COMMON BRAND NAME(S): Flagyl  What should I tell my health care provider before I take this medicine?  They need to know if you have any of these conditions:  -anemia or other blood disorders  -disease of the nervous system  -fungal or yeast infection  -if you drink alcohol containing drinks  -liver disease  -seizures  -an unusual or allergic reaction to metronidazole, or other medicines, foods, dyes, or preservatives  -pregnant or trying to get pregnant  -breast-feeding  How should I use this medicine?  Take this medicine by mouth with a full glass of water. Follow the directions on the prescription label. Take your medicine at regular intervals. Do not take your medicine more often than directed. Take all of your medicine as directed even if you think you are better. Do not skip doses or stop your medicine early.  Talk to your pediatrician regarding the use of this medicine in children. Special care may be needed.  Overdosage: If you think you have taken too much of this medicine contact a poison control center or emergency room at once.  NOTE: This medicine is only for you. Do not share this medicine with others.  What  if I miss a dose?  If you miss a dose, take it as soon as you can. If it is almost time for your next dose, take only that dose. Do not take double or extra doses.  What may interact with this medicine?  Do not take this medicine with any of the following medications:  -alcohol or any product that contains alcohol  -amprenavir oral solution  -cisapride  -disulfiram  -dofetilide  -dronedarone  -paclitaxel injection  -pimozide  -ritonavir oral solution  -sertraline oral solution  -sulfamethoxazole-trimethoprim injection  -thioridazine  -ziprasidone  This medicine may also interact with the following medications:  -birth control pills  -cimetidine  -lithium  -other medicines that prolong the QT interval (cause an abnormal heart rhythm)  -phenobarbital  -phenytoin  -warfarin  This list may not describe all possible interactions. Give your health care provider a list of all the medicines, herbs, non-prescription drugs, or dietary supplements you use. Also tell them if you smoke, drink alcohol, or use illegal drugs. Some items may interact with your medicine.  What should I watch for while using this medicine?  Tell your doctor or health care professional if your symptoms do not improve or if they get worse.  You may get drowsy or dizzy. Do not drive, use machinery, or do anything that needs mental alertness until you know how this medicine affects you. Do not stand or sit up quickly, especially if you are an older patient. This reduces the risk of dizzy or fainting spells.  Avoid alcoholic drinks while you are taking this medicine and for three days afterward. Alcohol may make you feel dizzy, sick, or flushed.  If you are being treated for a sexually transmitted disease, avoid sexual contact until you have finished your treatment. Your sexual partner may also need treatment.  What side effects may I notice from receiving this medicine?  Side effects that you should report to your doctor or health care professional as  soon as possible:  -allergic reactions like skin rash or hives, swelling of the face, lips, or tongue  -confusion, clumsiness  -difficulty speaking  -discolored or sore mouth  -dizziness  -fever, infection  -numbness, tingling, pain or weakness in the hands or feet  -trouble passing urine or change in the amount of urine  -redness, blistering, peeling or loosening of the skin, including inside the mouth  -seizures  -unusually weak or tired  -vaginal irritation, dryness, or discharge  Side effects that usually do not require medical attention (report to your doctor or health care professional if they continue or are bothersome):  -diarrhea  -headache  -irritability  -metallic taste  -nausea  -stomach pain or cramps  -trouble sleeping  This list may not describe all possible side effects. Call your doctor for medical advice about side effects. You may report side effects to FDA at 8-631-FDA-0374.  Where should I keep my medicine?  Keep out of the reach of children.  Store at room temperature below 25 degrees C (77 degrees F). Protect from light. Keep container tightly closed. Throw away any unused medicine after the expiration date.  NOTE: This sheet is a summary. It may not cover all possible information. If you have questions about this medicine, talk to your doctor, pharmacist, or health care provider.  © 2018 Elsevier/Gold Standard (2014-07-25 14:08:39)      Depression / Suicide Risk    As you are discharged from this [facility] facility, it is important to learn how to keep safe from harming yourself.    Recognize the warning signs:  · Abrupt changes in personality, positive or negative- including increase in energy   · Giving away possessions  · Change in eating patterns- significant weight changes-  positive or negative  · Change in sleeping patterns- unable to sleep or sleeping all the time   · Unwillingness or inability to communicate  · Depression  · Unusual sadness, discouragement and loneliness  · Talk  of wanting to die  · Neglect of personal appearance   · Rebelliousness- reckless behavior  · Withdrawal from people/activities they love  · Confusion- inability to concentrate     If you or a loved one observes any of these behaviors or has concerns about self-harm, here's what you can do:  · Talk about it- your feelings and reasons for harming yourself  · Remove any means that you might use to hurt yourself (examples: pills, rope, extension cords, firearm)  · Get professional help from the community (Mental Health, Substance Abuse, psychological counseling)  · Do not be alone:Call your Safe Contact- someone whom you trust who will be there for you.  · Call your local CRISIS HOTLINE 954-2744 or 102-761-6665  · Call your local Children's Mobile Crisis Response Team [place] (318) 013-6010 or www.[employer]  · Call the toll free National Suicide Prevention Hotlines   · National Suicide Prevention Lifeline 686-264-EDCP (3477)  · National Hope Line Network 800-SUICIDE (345-1360)

## 2019-08-17 NOTE — DISCHARGE SUMMARY
Discharge Summary    CHIEF COMPLAINT ON ADMISSION  Chief Complaint   Patient presents with   • Chest Pain   • N/V       Reason for Admission  ems     Admission Date  8/14/2019    CODE STATUS  Full Code    HPI & HOSPITAL COURSE  This is a 69 y.o. Female with CAD s/p CABG, valve repair on Eliquis and ESRD here with chest pain. She has been seen at [facility] ER for the same several times but had reportedly left AMA. On the day of this presentation she was having HD when she developed substernal chest pain associated with mild SOB and nausea that improved with nitropaste. She was then transferred here for cardiology consultation.    EKG showed likely LVH but there was low suspicion for ACS. It was decided to further risk stratify her. Troponin T was trended which peaked at 300 and NM stress test was negative for any evidence of ischemia. Her chest pain resolved and there were no events on telemetry.     Of note, there was also mild colitis noted on CT at the outside hospital and she had been started on cipro and flagyl. Flagyl was continued to complete a 5 days course and her abdominal pain greatly improved. She had a good appetite, was ambulating and eager for discharge. She received one session of HD while she was here for maintenance and had no other issues or concerns.       Therefore, she is discharged in good and stable condition to home with close outpatient follow-up.      Discharge Date  8/17/2019    FOLLOW UP ITEMS POST DISCHARGE  Please follow up with your PCP and nephrology.    DISCHARGE DIAGNOSES  Principal Problem (Resolved):    Chest pain POA: Yes  Active Problems:    DM (diabetes mellitus) ([facility]) POA: Yes    CAD (coronary artery disease) POA: Yes    ESRD (end stage renal disease) ([facility]) POA: Yes    HTN (hypertension) POA: Yes    GERD (gastroesophageal reflux disease) POA: Yes    PVD (peripheral vascular disease) ([facility]) POA: Yes    H/O mitral valve repair POA: Yes    Thrombocytopenia ([facility]) POA: Yes     Anemia POA: Yes  Resolved Problems:    Colitis POA: Yes      FOLLOW UP  PCP    Schedule an appointment as soon as possible for a visit      Nephrologist    Schedule an appointment as soon as possible for a visit      Cardiologist    Schedule an appointment as soon as possible for a visit      Pcp Pt States None            MEDICATIONS ON DISCHARGE     Medication List      START taking these medications      Instructions   metroNIDAZOLE 500 MG Tabs  Commonly known as:  FLAGYL   Take 1 Tab by mouth every 8 hours for 2 days.  Dose:  500 mg        CONTINUE taking these medications      Instructions   albuterol 108 (90 Base) MCG/ACT Aers inhalation aerosol   Inhale 2 Puffs by mouth every 6 hours as needed for Shortness of Breath.  Dose:  2 Puff     calcium acetate 667 MG Caps  Commonly known as:  PHOS-LO   Take 1,334 mg by mouth 3 times a day, with meals.  Dose:  1,334 mg     carvedilol 6.25 MG Tabs  Commonly known as:  COREG   Take 6.25 mg by mouth 2 times a day, with meals.  Dose:  6.25 mg     cholecalciferol 400 UNIT Tabs  Commonly known as:  VITAMIN D3   Take 400 Units by mouth every 48 hours.  Dose:  400 Units     ELIQUIS 2.5mg Tabs  Generic drug:  apixaban   Take 2.5 mg by mouth 2 Times a Day.  Dose:  2.5 mg     ENTRESTO  MG Tabs tablet  Generic drug:  sacubitril-valsartan   Take 1 Tab by mouth 2 Times a Day.  Dose:  1 Tab     LIPITOR 40 MG Tabs  Generic drug:  atorvastatin   Take 40 mg by mouth every evening.  Dose:  40 mg     LORazepam 1 MG Tabs  Commonly known as:  ATIVAN   Take 1 Tab by mouth 2 Times a Day for 7 days.  Dose:  1 mg     nitroglycerin 0.4 MG Subl  Commonly known as:  NITROSTAT   Place 0.4 mg under tongue every 5 minutes as needed for Chest Pain.  Dose:  0.4 mg     ondansetron 4 MG Tbdp  Commonly known as:  ZOFRAN ODT   Take 4 mg by mouth every 6 hours as needed for Nausea.  Dose:  4 mg     prochlorperazine 5 MG Tabs  Commonly known as:  COMPAZINE   Take 5 mg by mouth every 6 hours as needed  for Nausea/Vomiting.  Dose:  5 mg     ROPINIRole 0.25 MG Tabs  Commonly known as:  REQUIP   Take 0.25 mg by mouth 3 times a day.  Dose:  0.25 mg     tramadol 50 MG Tabs  Commonly known as:  ULTRAM   Take 50 mg by mouth every four hours as needed.  Dose:  50 mg     TRINTELLIX 10 MG Tabs  Generic drug:  Vortioxetine HBr   Take 10 mg by mouth every day.  Dose:  10 mg            Allergies  Allergies   Allergen Reactions   • Digoxin    • Morphine    • Pcn [Penicillins]    • Sudafed        DIET  Orders Placed This Encounter   Procedures   • Diet Order Cardiac     Standing Status:   Standing     Number of Occurrences:   1     Order Specific Question:   Diet:     Answer:   Cardiac [6]     Order Specific Question:   Miscellaneous modifications:     Answer:   No Decaf, No Caffeine(for test) [11]       ACTIVITY  As tolerated.  Weight bearing as tolerated    CONSULTATIONS  Cardiology     PROCEDURES  NM-CARDIAC STRESS TEST   Final Result      OUTSIDE IMAGES-CT ABDOMEN /PELVIS   Final Result      OUTSIDE IMAGES-DX CHEST   Final Result      OUTSIDE IMAGES-DX CHEST   Final Result         NUCLEAR IMAGING INTERPRETATION   No reversible defects that would indicate ischemia.                                                                                                                                            Mild global hypokinesis.    ECG INTERPRETATION   Negative stress ECG for ischemia.    LABORATORY  Lab Results   Component Value Date    SODIUM 138 08/17/2019    POTASSIUM 3.8 08/17/2019    CHLORIDE 98 08/17/2019    CO2 28 08/17/2019    GLUCOSE 124 (H) 08/17/2019    BUN 26 (H) 08/17/2019    CREATININE 5.45 (HH) 08/17/2019        Lab Results   Component Value Date    WBC 5.1 08/16/2019    HEMOGLOBIN 10.1 (L) 08/16/2019    HEMATOCRIT 32.6 (L) 08/16/2019    PLATELETCT 102 (L) 08/16/2019        Total time of the discharge process exceeds 36 minutes.

## 2019-08-17 NOTE — PROGRESS NOTES
Pt returned from dialysis. 2 RN skin check complete.   Devices in place None.  Skin assessed under devices NA.  Confirmed pressure ulcers found on NA.  New potential pressure ulcers noted on NA. Wound consult placed NA.    The following interventions in place pt encouraged to turn self side to side.    Elbows pink and blanching  Bruising on BL arms  Sacrum red and blanching  Scattered scabbing on BL legs  Noted toes amputated on left foot.  Heels dry and blanching.

## 2019-08-17 NOTE — PROGRESS NOTES
Pt states she is moving back to [place] and is refusing to schedule an appointment here locally prior to discharge

## 2019-08-17 NOTE — PROGRESS NOTES
Monitor Summary: . 14/.12/.42 SB/SR 59-65  R PVC, R coup, O PAC, 19 bt of BBB, Pt asymptomatic.

## 2020-06-18 NOTE — ASSESSMENT & PLAN NOTE
Anesthesia Pre Eval Note    Anesthesia ROS/Med Hx        Anesthetic Complication History:  Patient does not have a history of anesthetic complications      Pulmonary Review:  Patient does not have a pulmonary history      Neuro/Psych Review:  Patient does not have a neuro/psych history       Cardiovascular Review:  Patient does not have a cardiovascular history     Additional Results:     ALLERGIES:  No Known Allergies       Lab Results       Component                Value               Date                       WBC                      6.7                 04/24/2020                 RBC                      4.61                04/24/2020                 HCT                      40.8                04/24/2020                 MCHC                     33.3                04/24/2020                 SODIUM                   138                 04/24/2020                 POTASSIUM                3.6                 04/24/2020                 CHLORIDE                 108 (H)             04/24/2020                 CO2                      27                  04/24/2020                 GLUCOSE                  142 (H)             04/24/2020                 BUN                      16                  04/24/2020                 CALCIUM                  8.5                 04/24/2020                 PLT                      149                 04/24/2020               Past Medical History:  No date: Malignant neoplasm (CMS/HCC)      Comment:  colon    Past Surgical History:  No date: Colonoscopy  No date: Tonsillectomy       Prior to Admission medications :  Medication tamsulosin (FLOMAX) 0.4 MG Cap, Sig Take 1 capsule by mouth daily after a meal for 14 days., Start Date 4/23/20, End Date 5/7/20, Taking? , Authorizing Provider Dm Yung MD    Medication HYDROcodone-acetaminophen (NORCO) 5-325 MG per tablet, Sig Take 1 tablet by mouth every 8 hours as needed for Pain., Start Date 4/22/20, End Date , Taking? , Authorizing  Normotensive.  - continue home coreg  - holding entresto, not on formulary   Provider Dm Yung MD    Medication pregabalin (LYRICA) 75 MG capsule, Sig Take 1 capsule by mouth 2 times daily for 7 days., Start Date 4/22/20, End Date 4/29/20, Taking? , Authorizing Provider Dm Yung MD            Relevant Problems   No relevant active problems       Physical Exam     Airway   Mallampati: II  TM Distance: >3 FB  Neck ROM: Full  Neck: Non-tender  TMJ Mobility: Good    Cardiovascular    Cardio Rate: Normal    Head Assessment  Head assessment: Normocephalic    General Assessment  General Assessment: Alert and oriented    Dental Exam  Dental exam normal    Pulmonary Exam    Breath sounds clear to auscultation:  Yes    Abdominal Exam  Abdominal exam normal      Anesthesia Plan    ASA Status: 2    Anesthesia Type: General    Induction: Intravenous  Maintenance: Combined      Risks Discussed: Nausea, Vomiting, Sore Throat and Dental Injury    Post-op Pain Management: Per Surgeon      Checklist  Reviewed: Lab Results, EKG, Allergies, Medications, Problem list, Past Med History, NPO Status, DNR Status and Beta Blocker Status    Informed Consent  The proposed anesthetic plan, including its risks and benefits, have been discussed with the Patient  - along with the risks and benefits of alternatives.  Questions were encouraged and answered and the patient and/or representative understands and agrees to proceed.

## 2022-05-25 NOTE — XMS
Atypical chest pain: EKG w no acute ischemia noted.   -     pantoprazole (PROTONIX) 40 MG tablet; Take 1 tablet (40 mg total) by mouth once daily.  Dispense: 30 tablet; Refill: 2  -     metoprolol succinate (TOPROL-XL) 25 MG 24 hr tablet; Take 1 tablet (25 mg total) by mouth once daily.  Dispense: 30 tablet; Refill: 2  -     IN OFFICE EKG 12-LEAD (to Robinson)  -     Ambulatory referral/consult to Cardiology; Future; Expected date: 06/25/2022    Gastroesophageal reflux disease, unspecified whether esophagitis present  -     pantoprazole (PROTONIX) 40 MG tablet; Take 1 tablet (40 mg total) by mouth once daily.  Dispense: 30 tablet; Refill: 2    Former smoker, stopped smoking in distant past  -     IN OFFICE EKG 12-LEAD (to Robinson)  -     Ambulatory referral/consult to Cardiology; Future; Expected date: 06/25/2022    Primary hypertension  -     CBC Auto Differential; Future; Expected date: 05/25/2022  -     Comprehensive Metabolic Panel; Future; Expected date: 05/25/2022  -     T4, Free; Future; Expected date: 05/25/2022  -     TSH; Future; Expected date: 05/25/2022  -     Lipid Panel; Future; Expected date: 05/25/2022  -     Magnesium; Future; Expected date: 05/25/2022  -     metoprolol succinate (TOPROL-XL) 25 MG 24 hr tablet; Take 1 tablet (25 mg total) by mouth once daily.  Dispense: 30 tablet; Refill: 2  -     IN OFFICE EKG 12-LEAD (to Muse)    Type 2 diabetes mellitus without complication, without long-term current use of insulin  -     Comprehensive Metabolic Panel; Future; Expected date: 05/25/2022  -     Hemoglobin A1C; Future; Expected date: 05/25/2022  -     T4, Free; Future; Expected date: 05/25/2022  -     TSH; Future; Expected date: 05/25/2022  -     metoprolol succinate (TOPROL-XL) 25 MG 24 hr tablet; Take 1 tablet (25 mg total) by mouth once daily.  Dispense: 30 tablet; Refill: 2  -     IN OFFICE EKG 12-LEAD (to Robinson)  -     Ambulatory referral/consult to Cardiology; Future; Expected date:  Encounter Summary
  Created on: 2019
 
 Cherie Villalobos
 External Reference #: [id number]
 : 48
 Sex: Female
 
 Demographics
 
 
+-----------------------+--------------------------+
| Address               | [address]               |
|                       | [place]  [address] |
+-----------------------+--------------------------+
| Home Phone            | [phone]          |
+-----------------------+--------------------------+
| Preferred Language    | Unknown                  |
+-----------------------+--------------------------+
| Marital Status        |                   |
+-----------------------+--------------------------+
| Latter-day Affiliation | [id number]                     |
+-----------------------+--------------------------+
| Race                  | Unknown                  |
+-----------------------+--------------------------+
| Ethnic Group          | Unknown                  |
+-----------------------+--------------------------+
 
 
 Author
 
 
+--------------+-----------------------+
| Author       | Sherry NVELO |
+--------------+-----------------------+
| Organization | [facility] [employer] |
+--------------+-----------------------+
| Address      | Unknown               |
+--------------+-----------------------+
| Phone        | Unavailable           |
+--------------+-----------------------+
 
 
 
 Support
 
 
+---------------+--------------+---------------------+-----------------+
| Name          | Relationship | Address             | Phone           |
+---------------+--------------+---------------------+-----------------+
| Anoop Villalobos | ECON         | [place], | [phone] |
|               |              |  [place]  [address]     |                 |
+---------------+--------------+---------------------+-----------------+
| Jennifer Dickson | ECON         | [place]       | [phone] |
|               |              | [id number]               |                 |
+---------------+--------------+---------------------+-----------------+
 
 
 
 
 Care Team Providers
 
 
+-----------------------+------+-----------------+
| Care Team Member Name | Role | Phone           |
+-----------------------+------+-----------------+
| Ivy Couch DO      | PCP  | [phone] |
+-----------------------+------+-----------------+
 
 
 
 Reason for Visit
 
 
+--------+-----------------------------------------------------+
| Reason | Comments                                            |
+--------+-----------------------------------------------------+
| [facility]  | [facility] Note |
+--------+-----------------------------------------------------+
 
 
 
 Encounter Details
 
 
+--------+-------------+----------------------+----------------------+----------------------
+
| Date   | Type        | Department           | Care Team            | Description          
|
+--------+-------------+----------------------+----------------------+----------------------
+
| 02/15/ | Documentati |   [facility]      |   João Asher MD  | Other (January       
|
| 2019   | on Only     | [place]  [address]        |  [address]   | [facility] Provider 
|
|        |             | [address]   | [address]  [place], [place]    |  Dialysis Rounding   
|
|        |             | [place] [address]   | [id number]  [phone]  | Note)                
|
|        |             | [phone]         |  [phone] (Fax)  |                      
|
+--------+-------------+----------------------+----------------------+----------------------
+
 
 
 
 Social History
 
 
+---------------+------------+-----------+--------+------------------+
| Tobacco Use   | Types      | Packs/Day | Years  | Date             |
|               |            |           | Used   |                  |
+---------------+------------+-----------+--------+------------------+
| Former Smoker | Cigarettes | 1         | 30     | Quit: 2004 |
+---------------+------------+-----------+--------+------------------+
 
 
 
+---------------------+---+---+---+
 
| Smokeless Tobacco:  |   |   |   |
| Never Used          |   |   |   |
+---------------------+---+---+---+
 
 
 
+------------------------------+
| Comments: quite smoking  |
+------------------------------+
 
 
 
+-------------+-----------+---------+----------+
| Alcohol Use | Drinks/We | oz/Week | Comments |
|             | ek        |         |          |
+-------------+-----------+---------+----------+
| No          |           |         |          |
+-------------+-----------+---------+----------+
 
 
 
+------------------+---------------+
| Sex Assigned at  | Date Recorded |
| Birth            |               |
+------------------+---------------+
| Not on file      |               |
+------------------+---------------+
 as of this encounter
 
 Plan of Treatment
 
 
+--------+----------+-----------------+----------------------+-------------+
| Date   | Type     | Specialty       | Care Team            | Description |
+--------+----------+-----------------+----------------------+-------------+
| / | Initial  | General Surgery |   Сергей Medeiros, |             |
|    | consult  |                 |  MD [address]  |             |
|        |          |                 |  [facility] [place] [address]  |             |
|        |          |                 |  [phone]        |             |
|        |          |                 | [phone] (Fax)   |             |
+--------+----------+-----------------+----------------------+-------------+
 as of this encounter
 
 Visit Diagnoses
 Not on filein this encounter 06/25/2022    Hyperlipidemia, unspecified hyperlipidemia type  -     Comprehensive Metabolic Panel; Future; Expected date: 05/25/2022  -     T4, Free; Future; Expected date: 05/25/2022  -     TSH; Future; Expected date: 05/25/2022  -     Lipid Panel; Future; Expected date: 05/25/2022    Myalgia; tylenol arthritis for pain; keep well hydrated; use Qunol 100 mg a day otc. Pravastatin may be contributing to his muscle aches; Qunol can help reduce these.   -     CK; Future; Expected date: 05/25/2022    Overweight (BMI 25.0-29.9); Caloric restriction w regular exercise and weight reduction.  -     Comprehensive Metabolic Panel; Future; Expected date: 05/25/2022  -     Hemoglobin A1C; Future; Expected date: 05/25/2022  -     T4, Free; Future; Expected date: 05/25/2022  -     TSH; Future; Expected date: 05/25/2022  -     Vitamin B12; Future; Expected date: 05/25/2022    Encounter to establish care with new doctor    Encounter for laboratory test  -     CBC Auto Differential; Future; Expected date: 05/25/2022  -     Comprehensive Metabolic Panel; Future; Expected date: 05/25/2022  -     Hemoglobin A1C; Future; Expected date: 05/25/2022  -     T4, Free; Future; Expected date: 05/25/2022  -     TSH; Future; Expected date: 05/25/2022  -     Lipid Panel; Future; Expected date: 05/25/2022  -     PSA, Screening; Future; Expected date: 05/25/2022  -     Magnesium; Future; Expected date: 05/25/2022  -     Vitamin B12; Future; Expected date: 05/25/2022    Prostate cancer screening  -     PSA, Screening; Future; Expected date: 05/25/2022    At risk for side effect of medication; possible metforminand pravastatin.   -     Vitamin B12; Future; Expected date: 05/25/2022  -     CK; Future; Expected date: 05/25/2022    Alcohol dependence, uncomplicated; needs to wean off altogether but to at least less then 6-7 a week.    -     Vitamin B12; Future; Expected date: 05/25/2022    Uncomplicated alcohol dependence

## 2022-09-25 NOTE — XMS
Encounter Summary
  Created on: 2019
 
 Cherie Villalobos
 External Reference #: [id number]
 : 48
 Sex: Female
 
 Demographics
 
 
+-----------------------+--------------------------+
| Address               | [address]               |
|                       | [place]  [address] |
+-----------------------+--------------------------+
| Home Phone            | [phone]          |
+-----------------------+--------------------------+
| Preferred Language    | Unknown                  |
+-----------------------+--------------------------+
| Marital Status        |                   |
+-----------------------+--------------------------+
| Hindu Affiliation | 1013                     |
+-----------------------+--------------------------+
| Race                  | Unknown                  |
+-----------------------+--------------------------+
| Ethnic Group          | Unknown                  |
+-----------------------+--------------------------+
 
 
 Author
 
 
+--------------+-----------------------+
| Author       | Alba Becual |
+--------------+-----------------------+
| Organization | [facility] |
+--------------+-----------------------+
| Address      | Unknown               |
+--------------+-----------------------+
| Phone        | Unavailable           |
+--------------+-----------------------+
 
 
 
 Support
 
 
+---------------+--------------+---------------------+-----------------+
| Name          | Relationship | Address             | Phone           |
+---------------+--------------+---------------------+-----------------+
| Anoop Villalobos | ECON         | [place], | [phone] |
|               |              |  [place]  [address]     |                 |
+---------------+--------------+---------------------+-----------------+
| Jennifer Dickson | ECON         | [place]       | [phone] |
|               |              | [phone]               |                 |
+---------------+--------------+---------------------+-----------------+
 
 
 
 
 Care Team Providers
 
 
+-----------------------+------+-----------------+
| Care Team Member Name | Role | Phone           |
+-----------------------+------+-----------------+
| Ivy Couch DO      | PCP  | [phone] |
+-----------------------+------+-----------------+
 
 
 
 Reason for Visit
 
 
+--------------+-------------------------------------------------------------------+
| Reason       | Comments                                                          |
+--------------+-------------------------------------------------------------------+
| Post-op Exam | Post op, amputation, left foot. Patient pain level is 3/10 and    |
|              | relates she Fx her right hallux. Patient is unsure when her       |
|              | injury took place and went to [facility] ED and had xrays. Patient      |
|              | states she has been walking on the toe since going to the ED.     |
|              | Patient states her left foot continues to phantom pain. Patient   |
|              | states that other than phantom pain her left foot is doing well.  |
+--------------+-------------------------------------------------------------------+
 
 
 
 Encounter Details
 
 
+--------+---------+----------------------+----------------------+----------------------+
| Date   | Type    | Department           | Care Team            | Description          |
+--------+---------+----------------------+----------------------+----------------------+
| / | Office  |   [facility] |   Srinivasan Jordan,  | Closed nondisplaced  |
| 2019   | Visit   |  and Ankle  [address]      | DPM  [address], | fracture of distal   |
|        |         | [address]   |  [address]  [place],  | phalanx of right     |
|        |         | [facility] [place]         | [place] [address]             | great toe with       |
|        |         | [address]           | [phone]         | routine healing,     |
|        |         | [phone]         | [phone] (Fax)   | subsequent encounter |
|        |         |                      |                      |  (Primary Dx);       |
|        |         |                      |                      | Post-operative       |
|        |         |                      |                      | state; Toe pain,     |
|        |         |                      |                      | right                |
+--------+---------+----------------------+----------------------+----------------------+
 
 
 
 Social History
 
 
+---------------+------------+-----------+--------+------------------+
| Tobacco Use   | Types      | Packs/Day | Years  | Date             |
|               |            |           | Used   |                  |
+---------------+------------+-----------+--------+------------------+
| Former Smoker | Cigarettes | 1         | 30     | Quit: 2004 |
+---------------+------------+-----------+--------+------------------+
 
 
 
 
+---------------------+---+---+---+
| Smokeless Tobacco:  |   |   |   |
| Never Used          |   |   |   |
+---------------------+---+---+---+
 
 
 
+------------------------------+
| Comments: quite smoking  |
+------------------------------+
 
 
 
+-------------+-----------+---------+----------+
| Alcohol Use | Drinks/We | oz/Week | Comments |
|             | ek        |         |          |
+-------------+-----------+---------+----------+
| No          |           |         |          |
+-------------+-----------+---------+----------+
 
 
 
+------------------+---------------+
| Sex Assigned at  | Date Recorded |
| Birth            |               |
+------------------+---------------+
| Not on file      |               |
+------------------+---------------+
 as of this encounter
 
 Last Filed Vital Signs
 
 
+-------------------+---------------------+-------------------------+
| Vital Sign        | Reading             | Time Taken              |
+-------------------+---------------------+-------------------------+
| Blood Pressure    | 124/60              | 2019  2:40 PM PST |
+-------------------+---------------------+-------------------------+
| Pulse             | 92                  | 2019  2:40 PM PST |
+-------------------+---------------------+-------------------------+
| Temperature       | 36.8   C (98.2   F) | 2019  2:40 PM PST |
+-------------------+---------------------+-------------------------+
| Respiratory Rate  | -                   | -                       |
+-------------------+---------------------+-------------------------+
| Oxygen Saturation | 97%                 | 2019  2:40 PM PST |
+-------------------+---------------------+-------------------------+
| Inhaled Oxygen    | -                   | -                       |
| Concentration     |                     |                         |
+-------------------+---------------------+-------------------------+
| Weight            | -                   | -                       |
+-------------------+---------------------+-------------------------+
| Height            | -                   | -                       |
+-------------------+---------------------+-------------------------+
| Body Mass Index   | -                   | -                       |
+-------------------+---------------------+-------------------------+
 in this encounter
 
 Progress Notes
 Srinivasan Jordan DPM - 2019  2:45 PM PSTFormatting of this note may be different fro
m the original.
 
  
 Subjective: 
  Patient ID: Cherie Villalobos is a 68 y.o. female.
 Chief Complaint 
 Patient presents with 
   Post-op Exam 
   Post op, amputation, left foot. Patient pain level is 3/10 and relates she Fx her right h
allux. Patient is unsure when her injury took place and went to [facility] ED and had xrays. Cindi
nt states she has been walking on the toe since going to the ED.  Patient states her left fo
ot continues to phantom pain. Patient states that other than phantom pain her left foot is d
oing well.  
  
 
 HPI
 Patient returns today status post TMA left on 18.  She reports that she suffered a fa
ll and may have broken her right great toe.  Initially in the hospital she reports that she 
would told she would need amputation of the toe.  It is looking better over time but slowly.
 Her left foot is healing well after Trans-metatarsal amputation, no concerns with that. 
 
 The following portions of the patient's history were reviewed and updated as appropriate an
d is available elsewhere in the record: allergies, current medications, past family history,
 past medical history, past social history, past surgical history and problem list.
 
 ROS
 Denies any nausea, vomiting, fever, chills, shortness of breath, chest pain, or calf pain 
 
     
 Objective: 
 /60  | Pulse 92  | Temp 98.2 F (36.8 C)  | SpO2 97% 
 General observation:
 Constitutional: The patient is alert and oriented to person, place and time
 Psychiatric: The patient has normal affect and mood; her speech is normal; her behavior is 
normal.
 Respiratory: Effort normal and breath sounds normal. No accessory muscle usage. No respirat
ory distress.   
 
 Lower Extremity Examination:
 Trans-metatarsal amputation left, site healing well, two very small superficial wounds
 Wound, irregular and linear dorsal right great toe, no erythema, mild edema of toe
 No skin mottling. No hyperesthesias or paresthesias.
 No calf or thigh pain. Negative Homans sign. 
 pain on palpation of the interphalangeal joint of the hallux right dorsal
 
 Xr Toe Right 2 View
 
 Result Date: 2019
 No radiographic evidence of osteomyelitis seen.  If further assessment is warranted, consid
er correlation with MRI. Potential acute fracture through the base of the distal phalanx. Si
gned by: Gavin South Sign Date/Time: 2019 5:15 PM
 
 Us Lower Extremty  Arterial Right
 
 Result Date: 2019
 1. Right leg shows biphasic inflow with a greater than 50% stenosis at the origin of the dobbins
perficial femoral artery ([id number]).  Biphasic outflow. 2. Two vessel runoff to the right jeanne
t. Signed by: Eugenio Hoffman Sign Date/Time: 2019 3:11 PM
 
 Radiographs: .  See epic chart for complete read
 
 X-ray Foot Left
 
 
 Result Date: 2018
 Amputation of the left forefoot at the level of the proximal metatarsals. Surgical cutaneou
s staples are present. No radiographic evidence for osteomyelitis. Normal alignment of the h
indfoot. Mild degeneration of the midfoot. Electronically signed by Oleksandr Lemus MD on 2018 10:12 AM
 
 Ct Head Without Contrast
 
 Result Date: 2018
 1.  No acute intracranial pathology. Electronically signed by Steven Samano MD on  5:28 PM
 
 X-ray Chest 1 View
 
 Result Date: 2019
 1.  Small loculated pleural fluid collection seen overlying the lateral left heart border i
n the lower left chest.  There may also be a small pleural effusion at the right lung base w
hich is layering posteriorly. 2.  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 Electronically s
igned by Eugenio Hoffman DO on 2019 4:59 PM
 
 Angioplasty 18:
 1. Aorta with narrow aortic bifurcation with moderate stenosis of proximal left common errol
c artery. 
 2. Left SFA with moderate stenosis proximally. 
 3. Single vessel runoff to left foot via left anterior tibial artery. 
 4. Left posterior tibial artery and peroneal artery were occluded with reconstitution of di
stal posterior tibial artery at the left ankle via collaterals. 
 5. If forefoot amputation does not heal, may need popliteal to posterior tibial artery bypa
ss versus antegrade access of left common femoral artery with posterior tibial artery recana
lization. 
  
 Electronically signed by Kirt Mclaughlin MD on 2018 10:51 AM 
 
    
 Assessment and Plan: 
 S/p: TMA left on 18
 DM, ESRD, PAD
 Healing open fracture of right great toe?
 
 Radiographs taken, read and reviewed of involved foot/ankle and findings were discussed wit
h the patient
 Irregularity at the base of the distal phalanx concerning for avulsion fracture
 Wound appears stable dorsal right great toe
 Dressing change/applied today with dry sterile
 Rx diabetic shoes with toe filler left
 Continue use of removable cast boot until shoes received
 Fit with surgical shoe right, wear for all activities until DM shoes obtained
 Dress wound right great toe with bacitracin ointment daily with dry sterile dressing
 F/u 2 weeks
 
 Srinivasan Jordan DPM
  in this encounter
 
 Plan of Treatment
 
 
+--------+---------+-----------+----------------------+-------------+
| Date   | Type    | Specialty | Care Team            | Description |
+--------+---------+-----------+----------------------+-------------+
 
| 04/10/ | Office  | Podiatry  |   Srinivasan Jordan FAY,  |             |
|    | Visit   |           | DPM  [address], |             |
|        |         |           |  [address]  [place],  |             |
|        |         |           | [place] [address]             |             |
|        |         |           | [phone]         |             |
|        |         |           | [phone] (Fax)   |             |
+--------+---------+-----------+----------------------+-------------+
 as of this encounter
 
 Results
 X-ray foot right 3+ views (2019  2:58 PM)
 
+------------------------------------------------------------------------+--------------+
| Impressions                                                            | Performed At |
+------------------------------------------------------------------------+--------------+
|   Linear lucency/irregularity at the distal 1st phalanx, may represent |   [facility]     |
|   minimally displaced fracture.  Signed by: Oleksandr Lemus  Sign         | RADIOLOGY    |
| Date/Time: 2019 10:20 AM                                         |              |
+------------------------------------------------------------------------+--------------+
 
 
 
+------------------------------------------------------------------------+--------------+
| Narrative                                                              | Performed At |
+------------------------------------------------------------------------+--------------+
|   RIGHT FOOT THREE VIEWS  CLINICAL INFORMATION:  Possible fracture of  |   [facility]     |
| right hallux.  COMPARISON:  XR TOES RIGHT (2019); XR FOOT LEFT    | RADIOLOGY    |
| (2018);  FINDINGS:  No acute fracture or dislocation.  Small     |              |
| calcaneal plantar enthesophyte.  Linear lucency/irregularity at the    |              |
| distal 1st phalanx, may represent  minimally displaced fracture.  Mild |              |
|  midfoot degeneration.                                                 |              |
+------------------------------------------------------------------------+--------------+
 
 
 
+-------------------------------------------------------------------------+
| Procedure Note                                                          |
+-------------------------------------------------------------------------+
|   Lauro Baldwin Results In - 2019 10:23 AM PST  RIGHT FOOT THREE VIEWS |
| CLINICAL INFORMATION:                                                   |
| Possible fracture of right hallux.                                      |
| COMPARISON:                                                             |
| XR TOES RIGHT (2019); XR FOOT LEFT (2018);                   |
| FINDINGS:                                                               |
| No acute fracture or dislocation.                                       |
| Small calcaneal plantar enthesophyte.                                   |
| Linear lucency/irregularity at the distal 1st phalanx, may represent    |
| minimally displaced fracture.                                           |
| Mild midfoot degeneration.                                              |
| IMPRESSION:                                                             |
| Linear lucency/irregularity at the distal 1st phalanx, may represent    |
| minimally displaced fracture.                                           |
| Signed by: Oleksandr Lemus                                                 |
| Sign Date/Time: 2019 10:20 AM                                     |
+-------------------------------------------------------------------------+
 
 
 
+--------------------+------------------+--------------------+--------------+
| Performing         | Address          | City/State/Zipcode | Phone Number |
 
| Organization       |                  |                    |              |
+--------------------+------------------+--------------------+--------------+
|   [facility] |   [address] | [place] [address] |              |
+--------------------+------------------+--------------------+--------------+
 X-ray foot left 3 + views (2019  2:58 PM)
 
+----------------------------------------------------------------------+--------------+
| Impressions                                                          | Performed At |
+----------------------------------------------------------------------+--------------+
|   No acute fracture.    No radiographic evidence for osteomyelitis   |   [facility]     |
| Signed by: Oleksandr Lemus Date/Time: 2019 10:21 AM         | RADIOLOGY    |
+----------------------------------------------------------------------+--------------+
 
 
 
+------------------------------------------------------------------------+--------------+
| Narrative                                                              | Performed At |
+------------------------------------------------------------------------+--------------+
|   LEFT FOOT THREE VIEWS  CLINICAL INFORMATION:  8 weeks post op,       |   [facility]     |
| forefoot amputation.  COMPARISON:  XR TOES RIGHT (2019); XR FOOT  | RADIOLOGY    |
| LEFT (2018); XR FOOT LEFT  (2018);  FINDINGS:  Amputation  |              |
| of the forefoot at the level of the proximal metacarpals.  No          |              |
| radiographic evidence for osteomyelitis.  No acute fractures.          |              |
+------------------------------------------------------------------------+--------------+
 
 
 
+------------------------------------------------------------------------+
| Procedure Note                                                         |
+------------------------------------------------------------------------+
|   Lauro Baldwin Results In 2019 10:25 AM PST  LEFT FOOT THREE VIEWS |
| CLINICAL INFORMATION:                                                  |
| 8 weeks post op, forefoot amputation.                                  |
| COMPARISON:                                                            |
| XR TOES RIGHT (2019); XR FOOT LEFT (2018); XR FOOT LEFT     |
| (2018);                                                          |
| FINDINGS:                                                              |
| Amputation of the forefoot at the level of the proximal metacarpals.   |
| No radiographic evidence for osteomyelitis.                            |
| No acute fractures.                                                    |
| IMPRESSION:                                                            |
| No acute fracture.  No radiographic evidence for osteomyelitis         |
| Signed by: Oleksandr Lemus                                                |
| Sign Date/Time: 2019 10:21 AM                                    |
+------------------------------------------------------------------------+
 
 
 
+--------------------+------------------+--------------------+--------------+
| Performing         | Address          | City/State/Zipcode | Phone Number |
| Organization       |                  |                    |              |
+--------------------+------------------+--------------------+--------------+
|   [facility] |   [address] | [place] [address] |              |
+--------------------+------------------+--------------------+--------------+
 in this encounter
 
 Visit Diagnoses
 
 
+-----------------------------------------------------------------------------------+
 
| Diagnosis                                                                         |
+-----------------------------------------------------------------------------------+
|   Closed nondisplaced fracture of distal phalanx of right great toe with routine  |
| healing, subsequent encounter - Primary                                           |
+-----------------------------------------------------------------------------------+
|   Post-operative state                                                            |
+-----------------------------------------------------------------------------------+
|   Other postprocedural status                                                     |
+-----------------------------------------------------------------------------------+
|   Toe pain, right                                                                 |
+-----------------------------------------------------------------------------------+
|   Pain in limb                                                                    |
+-----------------------------------------------------------------------------------+ Pt had second tonic-clonic seizure in triage that lasted approximately 30 seconds.      Cem Ramsey RN  09/25/22 [id number]

## 2024-01-01 NOTE — XMS
Encounter Summary
  Created on: 2019
 
 Cherie Villalobos
 External Reference #: [id number]
 : 48
 Sex: Female
 
 Demographics
 
 
+-----------------------+--------------------------+
| Address               | [address]               |
|                       | [place]  [address] |
+-----------------------+--------------------------+
| Home Phone            | [phone]          |
+-----------------------+--------------------------+
| Preferred Language    | Unknown                  |
+-----------------------+--------------------------+
| Marital Status        |                   |
+-----------------------+--------------------------+
| Hoahaoism Affiliation | [id number]                     |
+-----------------------+--------------------------+
| Race                  | Unknown                  |
+-----------------------+--------------------------+
| Ethnic Group          | Unknown                  |
+-----------------------+--------------------------+
 
 
 Author
 
 
+--------------+-----------------------+
| Author       | Sherry Vrvana |
+--------------+-----------------------+
| Organization | [facility] [facility] |
+--------------+-----------------------+
| Address      | Unknown               |
+--------------+-----------------------+
| Phone        | Unavailable           |
+--------------+-----------------------+
 
 
 
 Support
 
 
+---------------+--------------+---------------------+-----------------+
| Name          | Relationship | Address             | Phone           |
+---------------+--------------+---------------------+-----------------+
| Anoop Villalobos | ECON         | [place], | [phone] |
|               |              |  [place]  [address]     |                 |
+---------------+--------------+---------------------+-----------------+
| Jennifer Dickson | ECON         | [place]       | [phone] |
|               |              | [phone]               |                 |
+---------------+--------------+---------------------+-----------------+
 
 
 
 
 Care Team Providers
 
 
+-----------------------+------+-----------------+
| Care Team Member Name | Role | Phone           |
+-----------------------+------+-----------------+
| Ivy Couch DO      | PCP  | [phone] |
+-----------------------+------+-----------------+
 
 
 
 Reason for Visit
 
 
+--------+-----------------------------------------------------+
| Reason | Comments                                            |
+--------+-----------------------------------------------------+
| [facility]  | 2019-[facility] Note |
+--------+-----------------------------------------------------+
 
 
 
 Encounter Details
 
 
+--------+-------------+----------------------+----------------------+----------------------
+
| Date   | Type        | Department           | Care Team            | Description          
|
+--------+-------------+----------------------+----------------------+----------------------
+
| 02/15/ | Documentati |   [facility]      |   João Asher MD  | Other (January       
|
| 2019   | on Only     | [place]  [address]        |  [address]   | 2019-[facility] Provider 
|
|        |             | [address]   | [address]  [place], [place]    |  Dialysis Rounding   
|
|        |             | [place] [address]   | [id number]  [phone]  | Note)                
|
|        |             | [phone]         |  [phone] (Fax)  |                      
|
+--------+-------------+----------------------+----------------------+----------------------
+
 
 
 
 Social History
 
 
+---------------+------------+-----------+--------+------------------+
| Tobacco Use   | Types      | Packs/Day | Years  | Date             |
|               |            |           | Used   |                  |
+---------------+------------+-----------+--------+------------------+
| Former Smoker | Cigarettes | 1         | 30     | Quit: 2004 |
+---------------+------------+-----------+--------+------------------+
 
 
 
+---------------------+---+---+---+
 
| Smokeless Tobacco:  |   |   |   |
| Never Used          |   |   |   |
+---------------------+---+---+---+
 
 
 
+------------------------------+
| Comments: quite smoking  |
+------------------------------+
 
 
 
+-------------+-----------+---------+----------+
| Alcohol Use | Drinks/We | oz/Week | Comments |
|             | ek        |         |          |
+-------------+-----------+---------+----------+
| No          |           |         |          |
+-------------+-----------+---------+----------+
 
 
 
+------------------+---------------+
| Sex Assigned at  | Date Recorded |
| Birth            |               |
+------------------+---------------+
| Not on file      |               |
+------------------+---------------+
 as of this encounter
 
 Plan of Treatment
 
 
+--------+---------+-----------+----------------------+-------------+
| Date   | Type    | Specialty | Care Team            | Description |
+--------+---------+-----------+----------------------+-------------+
| 04/10/ | Office  | Podiatry  |   Srinivasan Jordan,  |             |
|    | Visit   |           | DPM  [address], |             |
|        |         |           |  [address]  [place],  |             |
|        |         |           | [place] [address]             |             |
|        |         |           | [phone]         |             |
|        |         |           | [phone] (Fax)   |             |
+--------+---------+-----------+----------------------+-------------+
 as of this encounter
 
 Visit Diagnoses
 Not on filein this encounter 36.837
